# Patient Record
Sex: MALE | Race: WHITE | Employment: OTHER | URBAN - NONMETROPOLITAN AREA
[De-identification: names, ages, dates, MRNs, and addresses within clinical notes are randomized per-mention and may not be internally consistent; named-entity substitution may affect disease eponyms.]

---

## 2017-04-27 ENCOUNTER — OFFICE VISIT (OUTPATIENT)
Dept: UROLOGY | Age: 57
End: 2017-04-27

## 2017-04-27 VITALS
WEIGHT: 315 LBS | DIASTOLIC BLOOD PRESSURE: 88 MMHG | SYSTOLIC BLOOD PRESSURE: 138 MMHG | HEIGHT: 72 IN | BODY MASS INDEX: 42.66 KG/M2

## 2017-04-27 DIAGNOSIS — N39.41 URGE INCONTINENCE: ICD-10-CM

## 2017-04-27 DIAGNOSIS — N40.1 BENIGN NON-NODULAR PROSTATIC HYPERPLASIA WITH LOWER URINARY TRACT SYMPTOMS: ICD-10-CM

## 2017-04-27 DIAGNOSIS — R35.0 FREQUENCY OF URINATION: Primary | ICD-10-CM

## 2017-04-27 LAB — POST VOID RESIDUAL (PVR): 82 ML

## 2017-04-27 PROCEDURE — 99203 OFFICE O/P NEW LOW 30 MIN: CPT | Performed by: UROLOGY

## 2017-04-27 PROCEDURE — 51798 US URINE CAPACITY MEASURE: CPT | Performed by: UROLOGY

## 2017-04-27 RX ORDER — OXYBUTYNIN CHLORIDE 5 MG/1
5 TABLET ORAL 3 TIMES DAILY
Qty: 90 TABLET | Refills: 3 | Status: SHIPPED | OUTPATIENT
Start: 2017-04-27 | End: 2017-07-25 | Stop reason: SDUPTHER

## 2017-04-27 RX ORDER — TAMSULOSIN HYDROCHLORIDE 0.4 MG/1
0.4 CAPSULE ORAL NIGHTLY
Qty: 90 CAPSULE | Refills: 3 | Status: SHIPPED | OUTPATIENT
Start: 2017-04-27 | End: 2017-07-25 | Stop reason: SDUPTHER

## 2017-04-27 RX ORDER — LOSARTAN POTASSIUM 100 MG/1
100 TABLET ORAL DAILY
COMMUNITY
End: 2018-09-20

## 2017-04-27 RX ORDER — POLYETHYLENE GLYCOL 3350 17 G/17G
17 POWDER, FOR SOLUTION ORAL DAILY PRN
COMMUNITY
End: 2019-06-26

## 2017-04-27 RX ORDER — LACTULOSE 10 G/15ML
20 SOLUTION ORAL 3 TIMES DAILY
Status: ON HOLD | COMMUNITY
End: 2017-09-06 | Stop reason: ALTCHOICE

## 2017-04-27 ASSESSMENT — ENCOUNTER SYMPTOMS
COLOR CHANGE: 0
BACK PAIN: 1
ABDOMINAL PAIN: 0
NAUSEA: 0
EYE PAIN: 0
SHORTNESS OF BREATH: 1
CHEST TIGHTNESS: 0
EYE REDNESS: 0

## 2017-07-25 ENCOUNTER — OFFICE VISIT (OUTPATIENT)
Dept: UROLOGY | Age: 57
End: 2017-07-25
Payer: MEDICARE

## 2017-07-25 VITALS
WEIGHT: 315 LBS | SYSTOLIC BLOOD PRESSURE: 128 MMHG | DIASTOLIC BLOOD PRESSURE: 70 MMHG | HEIGHT: 73 IN | BODY MASS INDEX: 41.75 KG/M2

## 2017-07-25 DIAGNOSIS — N32.81 OAB (OVERACTIVE BLADDER): ICD-10-CM

## 2017-07-25 DIAGNOSIS — R35.0 URINARY FREQUENCY: Primary | ICD-10-CM

## 2017-07-25 DIAGNOSIS — N40.1 BENIGN NON-NODULAR PROSTATIC HYPERPLASIA WITH LOWER URINARY TRACT SYMPTOMS: ICD-10-CM

## 2017-07-25 LAB
BILIRUBIN URINE: NEGATIVE
BLOOD URINE, POC: NEGATIVE
CHARACTER, URINE: CLEAR
COLOR, URINE: YELLOW
GLUCOSE URINE: NEGATIVE MG/DL
KETONES, URINE: NEGATIVE
LEUKOCYTE CLUMPS, URINE: NEGATIVE
NITRITE, URINE: NEGATIVE
PH, URINE: 7
POST VOID RESIDUAL (PVR): 24 ML
PROTEIN, URINE: NEGATIVE MG/DL
SPECIFIC GRAVITY, URINE: 1.01 (ref 1–1.03)
UROBILINOGEN, URINE: 0.2 EU/DL

## 2017-07-25 PROCEDURE — 81003 URINALYSIS AUTO W/O SCOPE: CPT | Performed by: UROLOGY

## 2017-07-25 PROCEDURE — 51798 US URINE CAPACITY MEASURE: CPT | Performed by: UROLOGY

## 2017-07-25 PROCEDURE — 51741 ELECTRO-UROFLOWMETRY FIRST: CPT | Performed by: UROLOGY

## 2017-07-25 PROCEDURE — 99213 OFFICE O/P EST LOW 20 MIN: CPT | Performed by: UROLOGY

## 2017-07-25 RX ORDER — OXYBUTYNIN CHLORIDE 5 MG/1
5 TABLET ORAL 3 TIMES DAILY
Qty: 270 TABLET | Refills: 3 | Status: SHIPPED | OUTPATIENT
Start: 2017-07-25 | End: 2017-10-31

## 2017-07-25 RX ORDER — TAMSULOSIN HYDROCHLORIDE 0.4 MG/1
0.4 CAPSULE ORAL NIGHTLY
Qty: 90 CAPSULE | Refills: 3 | Status: SHIPPED | OUTPATIENT
Start: 2017-07-25 | End: 2017-12-13 | Stop reason: SDUPTHER

## 2017-09-06 ENCOUNTER — APPOINTMENT (OUTPATIENT)
Dept: CT IMAGING | Age: 57
DRG: 389 | End: 2017-09-06
Payer: MEDICARE

## 2017-09-06 ENCOUNTER — HOSPITAL ENCOUNTER (INPATIENT)
Age: 57
LOS: 4 days | Discharge: HOME | DRG: 389 | End: 2017-09-10
Attending: FAMILY MEDICINE | Admitting: INTERNAL MEDICINE
Payer: MEDICARE

## 2017-09-06 ENCOUNTER — APPOINTMENT (OUTPATIENT)
Dept: GENERAL RADIOLOGY | Age: 57
DRG: 389 | End: 2017-09-06
Payer: MEDICARE

## 2017-09-06 DIAGNOSIS — K56.609 SMALL BOWEL OBSTRUCTION (HCC): Primary | ICD-10-CM

## 2017-09-06 LAB
ALBUMIN SERPL-MCNC: 4.3 G/DL (ref 3.5–5.1)
ALP BLD-CCNC: 80 U/L (ref 38–126)
ALT SERPL-CCNC: 50 U/L (ref 11–66)
ANION GAP SERPL CALCULATED.3IONS-SCNC: 11 MEQ/L (ref 8–16)
ANISOCYTOSIS: ABNORMAL
AST SERPL-CCNC: 43 U/L (ref 5–40)
BACTERIA: ABNORMAL /HPF
BASOPHILS # BLD: 0.8 %
BASOPHILS ABSOLUTE: 0.1 THOU/MM3 (ref 0–0.1)
BILIRUB SERPL-MCNC: 0.7 MG/DL (ref 0.3–1.2)
BILIRUBIN DIRECT: < 0.2 MG/DL (ref 0–0.3)
BILIRUBIN URINE: NEGATIVE
BLOOD, URINE: NEGATIVE
BUN BLDV-MCNC: 7 MG/DL (ref 7–22)
CALCIUM SERPL-MCNC: 10.5 MG/DL (ref 8.5–10.5)
CASTS UA: ABNORMAL /LPF
CHARACTER, URINE: CLEAR
CHLORIDE BLD-SCNC: 97 MEQ/L (ref 98–111)
CO2: 31 MEQ/L (ref 23–33)
COLOR: YELLOW
CREAT SERPL-MCNC: 0.7 MG/DL (ref 0.4–1.2)
CRYSTALS, UA: ABNORMAL
EOSINOPHIL # BLD: 2.6 %
EOSINOPHILS ABSOLUTE: 0.3 THOU/MM3 (ref 0–0.4)
EPITHELIAL CELLS, UA: ABNORMAL /HPF
GFR SERPL CREATININE-BSD FRML MDRD: > 90 ML/MIN/1.73M2
GLUCOSE BLD-MCNC: 121 MG/DL (ref 70–108)
GLUCOSE URINE: NEGATIVE MG/DL
HCT VFR BLD CALC: 46.6 % (ref 42–52)
HEMOGLOBIN: 16 GM/DL (ref 14–18)
KETONES, URINE: NEGATIVE
LACTIC ACID: 1.5 MMOL/L (ref 0.5–2.2)
LEUKOCYTE ESTERASE, URINE: NEGATIVE
LIPASE: 15.8 U/L (ref 5.6–51.3)
LYMPHOCYTES # BLD: 11.2 %
LYMPHOCYTES ABSOLUTE: 1.3 THOU/MM3 (ref 1–4.8)
MAGNESIUM: 2.1 MG/DL (ref 1.6–2.4)
MCH RBC QN AUTO: 29.3 PG (ref 27–31)
MCHC RBC AUTO-ENTMCNC: 34.3 GM/DL (ref 33–37)
MCV RBC AUTO: 85.5 FL (ref 80–94)
MONOCYTES # BLD: 8.8 %
MONOCYTES ABSOLUTE: 1 THOU/MM3 (ref 0.4–1.3)
MRSA SCREEN RT-PCR: NEGATIVE
NITRITE, URINE: NEGATIVE
NUCLEATED RED BLOOD CELLS: 0 /100 WBC
OSMOLALITY CALCULATION: 276.8 MOSMOL/KG (ref 275–300)
PDW BLD-RTO: 15.5 % (ref 11.5–14.5)
PH UA: 7.5
PLATELET # BLD: 267 THOU/MM3 (ref 130–400)
PMV BLD AUTO: 7.6 MCM (ref 7.4–10.4)
POTASSIUM SERPL-SCNC: 3.6 MEQ/L (ref 3.5–5.2)
PRO-BNP: 163.5 PG/ML (ref 0–900)
PROTEIN UA: 100
RBC # BLD: 5.45 MILL/MM3 (ref 4.7–6.1)
RBC # BLD: NORMAL 10*6/UL
RBC URINE: ABNORMAL /HPF
SEG NEUTROPHILS: 76.6 %
SEGMENTED NEUTROPHILS ABSOLUTE COUNT: 9 THOU/MM3 (ref 1.8–7.7)
SODIUM BLD-SCNC: 139 MEQ/L (ref 135–145)
SPECIFIC GRAVITY, URINE: > 1.03 (ref 1–1.03)
TOTAL PROTEIN: 8 G/DL (ref 6.1–8)
TROPONIN T: < 0.01 NG/ML
UROBILINOGEN, URINE: 0.2 EU/DL
WBC # BLD: 11.8 THOU/MM3 (ref 4.8–10.8)
WBC UA: ABNORMAL /HPF

## 2017-09-06 PROCEDURE — 6360000002 HC RX W HCPCS: Performed by: FAMILY MEDICINE

## 2017-09-06 PROCEDURE — 6370000000 HC RX 637 (ALT 250 FOR IP): Performed by: INTERNAL MEDICINE

## 2017-09-06 PROCEDURE — 96374 THER/PROPH/DIAG INJ IV PUSH: CPT

## 2017-09-06 PROCEDURE — 81001 URINALYSIS AUTO W/SCOPE: CPT

## 2017-09-06 PROCEDURE — 2500000003 HC RX 250 WO HCPCS: Performed by: INTERNAL MEDICINE

## 2017-09-06 PROCEDURE — 2060000000 HC ICU INTERMEDIATE R&B

## 2017-09-06 PROCEDURE — 83690 ASSAY OF LIPASE: CPT

## 2017-09-06 PROCEDURE — 6360000002 HC RX W HCPCS: Performed by: INTERNAL MEDICINE

## 2017-09-06 PROCEDURE — 71010 XR CHEST PORTABLE: CPT

## 2017-09-06 PROCEDURE — 36415 COLL VENOUS BLD VENIPUNCTURE: CPT

## 2017-09-06 PROCEDURE — 99285 EMERGENCY DEPT VISIT HI MDM: CPT

## 2017-09-06 PROCEDURE — 93005 ELECTROCARDIOGRAM TRACING: CPT

## 2017-09-06 PROCEDURE — 83880 ASSAY OF NATRIURETIC PEPTIDE: CPT

## 2017-09-06 PROCEDURE — 74177 CT ABD & PELVIS W/CONTRAST: CPT

## 2017-09-06 PROCEDURE — 87081 CULTURE SCREEN ONLY: CPT

## 2017-09-06 PROCEDURE — 80053 COMPREHEN METABOLIC PANEL: CPT

## 2017-09-06 PROCEDURE — 83605 ASSAY OF LACTIC ACID: CPT

## 2017-09-06 PROCEDURE — 87641 MR-STAPH DNA AMP PROBE: CPT

## 2017-09-06 PROCEDURE — 96361 HYDRATE IV INFUSION ADD-ON: CPT

## 2017-09-06 PROCEDURE — 82248 BILIRUBIN DIRECT: CPT

## 2017-09-06 PROCEDURE — 2580000003 HC RX 258: Performed by: FAMILY MEDICINE

## 2017-09-06 PROCEDURE — 85025 COMPLETE CBC W/AUTO DIFF WBC: CPT

## 2017-09-06 PROCEDURE — 6360000004 HC RX CONTRAST MEDICATION: Performed by: FAMILY MEDICINE

## 2017-09-06 PROCEDURE — 83735 ASSAY OF MAGNESIUM: CPT

## 2017-09-06 PROCEDURE — 84484 ASSAY OF TROPONIN QUANT: CPT

## 2017-09-06 PROCEDURE — 99223 1ST HOSP IP/OBS HIGH 75: CPT | Performed by: INTERNAL MEDICINE

## 2017-09-06 PROCEDURE — 2580000003 HC RX 258: Performed by: INTERNAL MEDICINE

## 2017-09-06 RX ORDER — ATENOLOL AND CHLORTHALIDONE TABLET 100; 25 MG/1; MG/1
1 TABLET ORAL DAILY
Status: ON HOLD | COMMUNITY
End: 2018-02-12 | Stop reason: HOSPADM

## 2017-09-06 RX ORDER — SODIUM CHLORIDE 9 MG/ML
INJECTION, SOLUTION INTRAVENOUS CONTINUOUS
Status: DISCONTINUED | OUTPATIENT
Start: 2017-09-06 | End: 2017-09-09

## 2017-09-06 RX ORDER — ALBUTEROL SULFATE 90 UG/1
1 AEROSOL, METERED RESPIRATORY (INHALATION) EVERY 6 HOURS PRN
Status: DISCONTINUED | OUTPATIENT
Start: 2017-09-06 | End: 2017-09-10 | Stop reason: HOSPADM

## 2017-09-06 RX ORDER — ONDANSETRON 2 MG/ML
4 INJECTION INTRAMUSCULAR; INTRAVENOUS ONCE
Status: COMPLETED | OUTPATIENT
Start: 2017-09-06 | End: 2017-09-06

## 2017-09-06 RX ORDER — MORPHINE SULFATE 4 MG/ML
4 INJECTION, SOLUTION INTRAMUSCULAR; INTRAVENOUS ONCE
Status: COMPLETED | OUTPATIENT
Start: 2017-09-06 | End: 2017-09-06

## 2017-09-06 RX ORDER — METOPROLOL TARTRATE 5 MG/5ML
INJECTION INTRAVENOUS
Status: DISPENSED
Start: 2017-09-06 | End: 2017-09-07

## 2017-09-06 RX ORDER — POTASSIUM CHLORIDE 20 MEQ/1
40 TABLET, EXTENDED RELEASE ORAL PRN
Status: DISCONTINUED | OUTPATIENT
Start: 2017-09-06 | End: 2017-09-10 | Stop reason: HOSPADM

## 2017-09-06 RX ORDER — POTASSIUM CHLORIDE 20MEQ/15ML
40 LIQUID (ML) ORAL PRN
Status: DISCONTINUED | OUTPATIENT
Start: 2017-09-06 | End: 2017-09-10 | Stop reason: HOSPADM

## 2017-09-06 RX ORDER — MORPHINE SULFATE 4 MG/ML
INJECTION, SOLUTION INTRAMUSCULAR; INTRAVENOUS
Status: DISPENSED
Start: 2017-09-06 | End: 2017-09-07

## 2017-09-06 RX ORDER — SODIUM CHLORIDE 0.9 % (FLUSH) 0.9 %
10 SYRINGE (ML) INJECTION EVERY 12 HOURS SCHEDULED
Status: DISCONTINUED | OUTPATIENT
Start: 2017-09-06 | End: 2017-09-10 | Stop reason: HOSPADM

## 2017-09-06 RX ORDER — 0.9 % SODIUM CHLORIDE 0.9 %
1000 INTRAVENOUS SOLUTION INTRAVENOUS ONCE
Status: COMPLETED | OUTPATIENT
Start: 2017-09-06 | End: 2017-09-06

## 2017-09-06 RX ORDER — AMLODIPINE BESYLATE 10 MG/1
10 TABLET ORAL DAILY
COMMUNITY
End: 2020-01-08 | Stop reason: ALTCHOICE

## 2017-09-06 RX ORDER — POTASSIUM CHLORIDE 7.45 MG/ML
10 INJECTION INTRAVENOUS PRN
Status: DISCONTINUED | OUTPATIENT
Start: 2017-09-06 | End: 2017-09-10 | Stop reason: HOSPADM

## 2017-09-06 RX ORDER — ONDANSETRON 2 MG/ML
4 INJECTION INTRAMUSCULAR; INTRAVENOUS EVERY 6 HOURS PRN
Status: DISCONTINUED | OUTPATIENT
Start: 2017-09-06 | End: 2017-09-10 | Stop reason: HOSPADM

## 2017-09-06 RX ORDER — ACETAMINOPHEN 325 MG/1
650 TABLET ORAL EVERY 4 HOURS PRN
Status: DISCONTINUED | OUTPATIENT
Start: 2017-09-06 | End: 2017-09-10 | Stop reason: HOSPADM

## 2017-09-06 RX ORDER — HYDRALAZINE HYDROCHLORIDE 20 MG/ML
10 INJECTION INTRAMUSCULAR; INTRAVENOUS ONCE
Status: COMPLETED | OUTPATIENT
Start: 2017-09-06 | End: 2017-09-06

## 2017-09-06 RX ORDER — SODIUM CHLORIDE 0.9 % (FLUSH) 0.9 %
10 SYRINGE (ML) INJECTION PRN
Status: DISCONTINUED | OUTPATIENT
Start: 2017-09-06 | End: 2017-09-10 | Stop reason: HOSPADM

## 2017-09-06 RX ORDER — METOPROLOL TARTRATE 5 MG/5ML
10 INJECTION INTRAVENOUS EVERY 6 HOURS
Status: DISCONTINUED | OUTPATIENT
Start: 2017-09-06 | End: 2017-09-07

## 2017-09-06 RX ADMIN — IOPAMIDOL 80 ML: 755 INJECTION, SOLUTION INTRAVENOUS at 13:56

## 2017-09-06 RX ADMIN — HYDROMORPHONE HYDROCHLORIDE 1 MG: 1 INJECTION, SOLUTION INTRAMUSCULAR; INTRAVENOUS; SUBCUTANEOUS at 18:27

## 2017-09-06 RX ADMIN — SODIUM CHLORIDE: 9 INJECTION, SOLUTION INTRAVENOUS at 18:19

## 2017-09-06 RX ADMIN — ONDANSETRON 4 MG: 2 INJECTION INTRAMUSCULAR; INTRAVENOUS at 15:32

## 2017-09-06 RX ADMIN — HYDROMORPHONE HYDROCHLORIDE 1 MG: 1 INJECTION, SOLUTION INTRAMUSCULAR; INTRAVENOUS; SUBCUTANEOUS at 23:33

## 2017-09-06 RX ADMIN — MORPHINE SULFATE 4 MG: 4 INJECTION, SOLUTION INTRAMUSCULAR; INTRAVENOUS at 16:25

## 2017-09-06 RX ADMIN — METOPROLOL TARTRATE 10 MG: 5 INJECTION INTRAVENOUS at 17:57

## 2017-09-06 RX ADMIN — HYDRALAZINE HYDROCHLORIDE 10 MG: 20 INJECTION INTRAMUSCULAR; INTRAVENOUS at 22:42

## 2017-09-06 RX ADMIN — SODIUM CHLORIDE 1000 ML: 9 INJECTION, SOLUTION INTRAVENOUS at 15:33

## 2017-09-06 ASSESSMENT — PAIN DESCRIPTION - LOCATION
LOCATION: ABDOMEN

## 2017-09-06 ASSESSMENT — PAIN SCALES - GENERAL
PAINLEVEL_OUTOF10: 10
PAINLEVEL_OUTOF10: 8
PAINLEVEL_OUTOF10: 8
PAINLEVEL_OUTOF10: 5
PAINLEVEL_OUTOF10: 7
PAINLEVEL_OUTOF10: 2

## 2017-09-06 ASSESSMENT — ENCOUNTER SYMPTOMS
NAUSEA: 1
CONSTIPATION: 0
ABDOMINAL PAIN: 1
COLOR CHANGE: 0
VOMITING: 1
SHORTNESS OF BREATH: 0
BACK PAIN: 0
DIARRHEA: 0
CHEST TIGHTNESS: 1

## 2017-09-06 ASSESSMENT — PAIN DESCRIPTION - PAIN TYPE
TYPE: ACUTE PAIN
TYPE: ACUTE PAIN

## 2017-09-06 ASSESSMENT — PAIN DESCRIPTION - DESCRIPTORS
DESCRIPTORS: CRAMPING
DESCRIPTORS: CRAMPING

## 2017-09-06 ASSESSMENT — PAIN DESCRIPTION - ORIENTATION
ORIENTATION: UPPER
ORIENTATION: UPPER

## 2017-09-07 PROBLEM — K63.8219 SMALL INTESTINAL BACTERIAL OVERGROWTH: Status: ACTIVE | Noted: 2017-09-07

## 2017-09-07 PROBLEM — R19.7 ACUTE DIARRHEA: Status: ACTIVE | Noted: 2017-09-07

## 2017-09-07 PROBLEM — K63.89 SMALL INTESTINAL BACTERIAL OVERGROWTH: Status: ACTIVE | Noted: 2017-09-07

## 2017-09-07 LAB
ANION GAP SERPL CALCULATED.3IONS-SCNC: 12 MEQ/L (ref 8–16)
AVERAGE GLUCOSE: 150 MG/DL (ref 70–126)
BUN BLDV-MCNC: 9 MG/DL (ref 7–22)
CALCIUM SERPL-MCNC: 9 MG/DL (ref 8.5–10.5)
CHLORIDE BLD-SCNC: 102 MEQ/L (ref 98–111)
CO2: 30 MEQ/L (ref 23–33)
CREAT SERPL-MCNC: 0.8 MG/DL (ref 0.4–1.2)
EKG ATRIAL RATE: 91 BPM
EKG P AXIS: 50 DEGREES
EKG P-R INTERVAL: 160 MS
EKG Q-T INTERVAL: 374 MS
EKG QRS DURATION: 100 MS
EKG QTC CALCULATION (BAZETT): 460 MS
EKG R AXIS: -41 DEGREES
EKG T AXIS: 22 DEGREES
EKG VENTRICULAR RATE: 91 BPM
GFR SERPL CREATININE-BSD FRML MDRD: > 90 ML/MIN/1.73M2
GLUCOSE BLD-MCNC: 127 MG/DL (ref 70–108)
HBA1C MFR BLD: 7 % (ref 4.4–6.4)
HCT VFR BLD CALC: 44.4 % (ref 42–52)
HEMOGLOBIN: 15.2 GM/DL (ref 14–18)
MCH RBC QN AUTO: 29.7 PG (ref 27–31)
MCHC RBC AUTO-ENTMCNC: 34.2 GM/DL (ref 33–37)
MCV RBC AUTO: 86.8 FL (ref 80–94)
PDW BLD-RTO: 15.5 % (ref 11.5–14.5)
PLATELET # BLD: 247 THOU/MM3 (ref 130–400)
PMV BLD AUTO: 7.5 MCM (ref 7.4–10.4)
POTASSIUM SERPL-SCNC: 3.7 MEQ/L (ref 3.5–5.2)
RBC # BLD: 5.11 MILL/MM3 (ref 4.7–6.1)
SODIUM BLD-SCNC: 144 MEQ/L (ref 135–145)
WBC # BLD: 10 THOU/MM3 (ref 4.8–10.8)

## 2017-09-07 PROCEDURE — 6360000002 HC RX W HCPCS: Performed by: INTERNAL MEDICINE

## 2017-09-07 PROCEDURE — A6198 ALGINATE DRESSING > 48 SQ IN: HCPCS

## 2017-09-07 PROCEDURE — 80048 BASIC METABOLIC PNL TOTAL CA: CPT

## 2017-09-07 PROCEDURE — 99222 1ST HOSP IP/OBS MODERATE 55: CPT | Performed by: SURGERY

## 2017-09-07 PROCEDURE — 2500000003 HC RX 250 WO HCPCS: Performed by: INTERNAL MEDICINE

## 2017-09-07 PROCEDURE — 2580000003 HC RX 258: Performed by: NURSE PRACTITIONER

## 2017-09-07 PROCEDURE — C9113 INJ PANTOPRAZOLE SODIUM, VIA: HCPCS | Performed by: NURSE PRACTITIONER

## 2017-09-07 PROCEDURE — 6370000000 HC RX 637 (ALT 250 FOR IP): Performed by: INTERNAL MEDICINE

## 2017-09-07 PROCEDURE — 2580000003 HC RX 258: Performed by: INTERNAL MEDICINE

## 2017-09-07 PROCEDURE — 83036 HEMOGLOBIN GLYCOSYLATED A1C: CPT

## 2017-09-07 PROCEDURE — 36415 COLL VENOUS BLD VENIPUNCTURE: CPT

## 2017-09-07 PROCEDURE — 85027 COMPLETE CBC AUTOMATED: CPT

## 2017-09-07 PROCEDURE — 99999 PR OFFICE/OUTPT VISIT,PROCEDURE ONLY: CPT | Performed by: NURSE PRACTITIONER

## 2017-09-07 PROCEDURE — 6370000000 HC RX 637 (ALT 250 FOR IP): Performed by: NURSE PRACTITIONER

## 2017-09-07 PROCEDURE — 2060000000 HC ICU INTERMEDIATE R&B

## 2017-09-07 PROCEDURE — 99233 SBSQ HOSP IP/OBS HIGH 50: CPT | Performed by: INTERNAL MEDICINE

## 2017-09-07 PROCEDURE — 6360000002 HC RX W HCPCS: Performed by: NURSE PRACTITIONER

## 2017-09-07 RX ORDER — LOSARTAN POTASSIUM 100 MG/1
100 TABLET ORAL DAILY
Status: DISCONTINUED | OUTPATIENT
Start: 2017-09-07 | End: 2017-09-10 | Stop reason: HOSPADM

## 2017-09-07 RX ORDER — BISACODYL 10 MG
10 SUPPOSITORY, RECTAL RECTAL DAILY
Status: DISCONTINUED | OUTPATIENT
Start: 2017-09-07 | End: 2017-09-10 | Stop reason: HOSPADM

## 2017-09-07 RX ORDER — CLONIDINE HYDROCHLORIDE 0.2 MG/1
0.2 TABLET ORAL 3 TIMES DAILY
Status: DISCONTINUED | OUTPATIENT
Start: 2017-09-07 | End: 2017-09-10 | Stop reason: HOSPADM

## 2017-09-07 RX ORDER — 0.9 % SODIUM CHLORIDE 0.9 %
10 VIAL (ML) INJECTION DAILY
Status: DISCONTINUED | OUTPATIENT
Start: 2017-09-07 | End: 2017-09-10

## 2017-09-07 RX ORDER — AMLODIPINE BESYLATE 10 MG/1
10 TABLET ORAL DAILY
Status: DISCONTINUED | OUTPATIENT
Start: 2017-09-07 | End: 2017-09-10 | Stop reason: HOSPADM

## 2017-09-07 RX ORDER — CHLORTHALIDONE 25 MG/1
25 TABLET ORAL DAILY
Status: DISCONTINUED | OUTPATIENT
Start: 2017-09-07 | End: 2017-09-10 | Stop reason: HOSPADM

## 2017-09-07 RX ORDER — PANTOPRAZOLE SODIUM 40 MG/10ML
40 INJECTION, POWDER, LYOPHILIZED, FOR SOLUTION INTRAVENOUS DAILY
Status: DISCONTINUED | OUTPATIENT
Start: 2017-09-07 | End: 2017-09-10

## 2017-09-07 RX ORDER — ATENOLOL 100 MG/1
100 TABLET ORAL DAILY
Status: DISCONTINUED | OUTPATIENT
Start: 2017-09-07 | End: 2017-09-10 | Stop reason: HOSPADM

## 2017-09-07 RX ORDER — METRONIDAZOLE 500 MG/1
500 TABLET ORAL EVERY 8 HOURS SCHEDULED
Status: DISCONTINUED | OUTPATIENT
Start: 2017-09-07 | End: 2017-09-10 | Stop reason: HOSPADM

## 2017-09-07 RX ORDER — ATENOLOL AND CHLORTHALIDONE TABLET 100; 25 MG/1; MG/1
1 TABLET ORAL DAILY
Status: DISCONTINUED | OUTPATIENT
Start: 2017-09-07 | End: 2017-09-07 | Stop reason: CLARIF

## 2017-09-07 RX ORDER — HYDRALAZINE HYDROCHLORIDE 20 MG/ML
10 INJECTION INTRAMUSCULAR; INTRAVENOUS EVERY 6 HOURS PRN
Status: DISCONTINUED | OUTPATIENT
Start: 2017-09-07 | End: 2017-09-10 | Stop reason: HOSPADM

## 2017-09-07 RX ADMIN — LOSARTAN POTASSIUM 100 MG: 100 TABLET, FILM COATED ORAL at 09:00

## 2017-09-07 RX ADMIN — METOPROLOL TARTRATE 10 MG: 5 INJECTION INTRAVENOUS at 01:01

## 2017-09-07 RX ADMIN — ENOXAPARIN SODIUM 40 MG: 40 INJECTION SUBCUTANEOUS at 20:03

## 2017-09-07 RX ADMIN — CLONIDINE HYDROCHLORIDE 0.2 MG: 0.2 TABLET ORAL at 09:00

## 2017-09-07 RX ADMIN — AMLODIPINE BESYLATE 10 MG: 10 TABLET ORAL at 09:00

## 2017-09-07 RX ADMIN — ACETAMINOPHEN 650 MG: 325 TABLET ORAL at 14:16

## 2017-09-07 RX ADMIN — METOPROLOL TARTRATE 10 MG: 5 INJECTION INTRAVENOUS at 06:44

## 2017-09-07 RX ADMIN — METRONIDAZOLE 500 MG: 500 TABLET, FILM COATED ORAL at 20:05

## 2017-09-07 RX ADMIN — METRONIDAZOLE 500 MG: 500 TABLET, FILM COATED ORAL at 14:16

## 2017-09-07 RX ADMIN — SODIUM CHLORIDE: 9 INJECTION, SOLUTION INTRAVENOUS at 06:54

## 2017-09-07 RX ADMIN — PANTOPRAZOLE SODIUM 40 MG: 40 INJECTION, POWDER, FOR SOLUTION INTRAVENOUS at 12:24

## 2017-09-07 RX ADMIN — MAGNESIUM HYDROXIDE 30 ML: 400 SUSPENSION ORAL at 23:39

## 2017-09-07 RX ADMIN — CLONIDINE HYDROCHLORIDE 0.2 MG: 0.2 TABLET ORAL at 14:16

## 2017-09-07 RX ADMIN — CLONIDINE HYDROCHLORIDE 0.2 MG: 0.2 TABLET ORAL at 20:03

## 2017-09-07 RX ADMIN — LEVOTHYROXINE SODIUM 175 MCG: 150 TABLET ORAL at 09:00

## 2017-09-07 RX ADMIN — ATENOLOL 100 MG: 100 TABLET ORAL at 10:11

## 2017-09-07 RX ADMIN — SODIUM CHLORIDE: 9 INJECTION, SOLUTION INTRAVENOUS at 19:58

## 2017-09-07 RX ADMIN — CHLORTHALIDONE 25 MG: 25 TABLET ORAL at 10:11

## 2017-09-07 RX ADMIN — Medication 10 ML: at 20:04

## 2017-09-07 RX ADMIN — ACETAMINOPHEN 650 MG: 325 TABLET ORAL at 20:03

## 2017-09-07 RX ADMIN — ACETAMINOPHEN 650 MG: 325 TABLET ORAL at 09:00

## 2017-09-07 RX ADMIN — ENOXAPARIN SODIUM 40 MG: 40 INJECTION SUBCUTANEOUS at 10:11

## 2017-09-07 RX ADMIN — HYDROMORPHONE HYDROCHLORIDE 1 MG: 1 INJECTION, SOLUTION INTRAMUSCULAR; INTRAVENOUS; SUBCUTANEOUS at 03:11

## 2017-09-07 RX ADMIN — Medication 10 ML: at 12:25

## 2017-09-07 RX ADMIN — Medication 10 ML: at 09:01

## 2017-09-07 ASSESSMENT — PAIN SCALES - GENERAL
PAINLEVEL_OUTOF10: 8
PAINLEVEL_OUTOF10: 3
PAINLEVEL_OUTOF10: 4
PAINLEVEL_OUTOF10: 3
PAINLEVEL_OUTOF10: 0
PAINLEVEL_OUTOF10: 0
PAINLEVEL_OUTOF10: 8
PAINLEVEL_OUTOF10: 5

## 2017-09-07 ASSESSMENT — PAIN DESCRIPTION - LOCATION
LOCATION: HEAD

## 2017-09-07 ASSESSMENT — PAIN DESCRIPTION - DESCRIPTORS
DESCRIPTORS: ACHING
DESCRIPTORS: ACHING

## 2017-09-07 ASSESSMENT — PAIN DESCRIPTION - PAIN TYPE
TYPE: ACUTE PAIN
TYPE: ACUTE PAIN

## 2017-09-08 ENCOUNTER — APPOINTMENT (OUTPATIENT)
Dept: GENERAL RADIOLOGY | Age: 57
DRG: 389 | End: 2017-09-08
Payer: MEDICARE

## 2017-09-08 LAB — VRE CULTURE: NORMAL

## 2017-09-08 PROCEDURE — 6360000002 HC RX W HCPCS: Performed by: NURSE PRACTITIONER

## 2017-09-08 PROCEDURE — C9113 INJ PANTOPRAZOLE SODIUM, VIA: HCPCS | Performed by: NURSE PRACTITIONER

## 2017-09-08 PROCEDURE — 6370000000 HC RX 637 (ALT 250 FOR IP): Performed by: INTERNAL MEDICINE

## 2017-09-08 PROCEDURE — 74000 XR ABDOMEN LIMITED (KUB): CPT

## 2017-09-08 PROCEDURE — 2580000003 HC RX 258: Performed by: NURSE PRACTITIONER

## 2017-09-08 PROCEDURE — 6370000000 HC RX 637 (ALT 250 FOR IP): Performed by: NURSE PRACTITIONER

## 2017-09-08 PROCEDURE — 99233 SBSQ HOSP IP/OBS HIGH 50: CPT | Performed by: SURGERY

## 2017-09-08 PROCEDURE — 2060000000 HC ICU INTERMEDIATE R&B

## 2017-09-08 PROCEDURE — 6360000002 HC RX W HCPCS: Performed by: INTERNAL MEDICINE

## 2017-09-08 PROCEDURE — 2580000003 HC RX 258: Performed by: INTERNAL MEDICINE

## 2017-09-08 PROCEDURE — 99232 SBSQ HOSP IP/OBS MODERATE 35: CPT | Performed by: FAMILY MEDICINE

## 2017-09-08 PROCEDURE — 99999 PR OFFICE/OUTPT VISIT,PROCEDURE ONLY: CPT | Performed by: NURSE PRACTITIONER

## 2017-09-08 RX ORDER — POLYETHYLENE GLYCOL 3350 17 G/17G
17 POWDER, FOR SOLUTION ORAL 2 TIMES DAILY
Status: DISCONTINUED | OUTPATIENT
Start: 2017-09-08 | End: 2017-09-10 | Stop reason: HOSPADM

## 2017-09-08 RX ADMIN — SODIUM CHLORIDE: 9 INJECTION, SOLUTION INTRAVENOUS at 10:37

## 2017-09-08 RX ADMIN — HYDRALAZINE HYDROCHLORIDE 10 MG: 20 INJECTION INTRAMUSCULAR; INTRAVENOUS at 12:59

## 2017-09-08 RX ADMIN — METRONIDAZOLE 500 MG: 500 TABLET, FILM COATED ORAL at 04:02

## 2017-09-08 RX ADMIN — ACETAMINOPHEN 650 MG: 325 TABLET ORAL at 17:11

## 2017-09-08 RX ADMIN — Medication 10 ML: at 09:08

## 2017-09-08 RX ADMIN — METRONIDAZOLE 500 MG: 500 TABLET, FILM COATED ORAL at 15:11

## 2017-09-08 RX ADMIN — ENOXAPARIN SODIUM 40 MG: 40 INJECTION SUBCUTANEOUS at 09:07

## 2017-09-08 RX ADMIN — CLONIDINE HYDROCHLORIDE 0.2 MG: 0.2 TABLET ORAL at 09:07

## 2017-09-08 RX ADMIN — METRONIDAZOLE 500 MG: 500 TABLET, FILM COATED ORAL at 20:50

## 2017-09-08 RX ADMIN — POLYETHYLENE GLYCOL 3350 17 G: 17 POWDER, FOR SOLUTION ORAL at 20:50

## 2017-09-08 RX ADMIN — CLONIDINE HYDROCHLORIDE 0.2 MG: 0.2 TABLET ORAL at 20:50

## 2017-09-08 RX ADMIN — POLYETHYLENE GLYCOL 3350 17 G: 17 POWDER, FOR SOLUTION ORAL at 12:55

## 2017-09-08 RX ADMIN — NALOXEGOL OXALATE 12.5 MG: 12.5 TABLET, FILM COATED ORAL at 15:11

## 2017-09-08 RX ADMIN — ENOXAPARIN SODIUM 40 MG: 40 INJECTION SUBCUTANEOUS at 20:50

## 2017-09-08 RX ADMIN — PANTOPRAZOLE SODIUM 40 MG: 40 INJECTION, POWDER, FOR SOLUTION INTRAVENOUS at 09:07

## 2017-09-08 RX ADMIN — CLONIDINE HYDROCHLORIDE 0.2 MG: 0.2 TABLET ORAL at 15:12

## 2017-09-08 RX ADMIN — ATENOLOL 100 MG: 100 TABLET ORAL at 09:07

## 2017-09-08 RX ADMIN — CHLORTHALIDONE 25 MG: 25 TABLET ORAL at 09:07

## 2017-09-08 RX ADMIN — LEVOTHYROXINE SODIUM 175 MCG: 150 TABLET ORAL at 09:07

## 2017-09-08 RX ADMIN — AMLODIPINE BESYLATE 10 MG: 10 TABLET ORAL at 09:07

## 2017-09-08 RX ADMIN — LOSARTAN POTASSIUM 100 MG: 100 TABLET, FILM COATED ORAL at 09:08

## 2017-09-08 RX ADMIN — ACETAMINOPHEN 650 MG: 325 TABLET ORAL at 22:38

## 2017-09-08 ASSESSMENT — PAIN SCALES - GENERAL
PAINLEVEL_OUTOF10: 3
PAINLEVEL_OUTOF10: 2

## 2017-09-08 ASSESSMENT — PAIN DESCRIPTION - LOCATION
LOCATION: GENERALIZED
LOCATION: HEAD

## 2017-09-08 ASSESSMENT — PAIN DESCRIPTION - PAIN TYPE
TYPE: ACUTE PAIN
TYPE: ACUTE PAIN

## 2017-09-08 ASSESSMENT — PAIN DESCRIPTION - DESCRIPTORS: DESCRIPTORS: HEADACHE

## 2017-09-09 PROCEDURE — 99999 PR OFFICE/OUTPT VISIT,PROCEDURE ONLY: CPT | Performed by: NURSE PRACTITIONER

## 2017-09-09 PROCEDURE — 6370000000 HC RX 637 (ALT 250 FOR IP): Performed by: NURSE PRACTITIONER

## 2017-09-09 PROCEDURE — 6370000000 HC RX 637 (ALT 250 FOR IP): Performed by: INTERNAL MEDICINE

## 2017-09-09 PROCEDURE — 6360000002 HC RX W HCPCS: Performed by: NURSE PRACTITIONER

## 2017-09-09 PROCEDURE — 6360000002 HC RX W HCPCS: Performed by: INTERNAL MEDICINE

## 2017-09-09 PROCEDURE — 2580000003 HC RX 258: Performed by: NURSE PRACTITIONER

## 2017-09-09 PROCEDURE — 99232 SBSQ HOSP IP/OBS MODERATE 35: CPT | Performed by: FAMILY MEDICINE

## 2017-09-09 PROCEDURE — C9113 INJ PANTOPRAZOLE SODIUM, VIA: HCPCS | Performed by: NURSE PRACTITIONER

## 2017-09-09 PROCEDURE — 2060000000 HC ICU INTERMEDIATE R&B

## 2017-09-09 PROCEDURE — A6198 ALGINATE DRESSING > 48 SQ IN: HCPCS

## 2017-09-09 PROCEDURE — 99233 SBSQ HOSP IP/OBS HIGH 50: CPT | Performed by: SURGERY

## 2017-09-09 PROCEDURE — 2580000003 HC RX 258: Performed by: INTERNAL MEDICINE

## 2017-09-09 RX ADMIN — LOSARTAN POTASSIUM 100 MG: 100 TABLET, FILM COATED ORAL at 10:12

## 2017-09-09 RX ADMIN — CHLORTHALIDONE 25 MG: 25 TABLET ORAL at 10:33

## 2017-09-09 RX ADMIN — CLONIDINE HYDROCHLORIDE 0.2 MG: 0.2 TABLET ORAL at 10:12

## 2017-09-09 RX ADMIN — ENOXAPARIN SODIUM 40 MG: 40 INJECTION SUBCUTANEOUS at 19:55

## 2017-09-09 RX ADMIN — NALOXEGOL OXALATE 12.5 MG: 12.5 TABLET, FILM COATED ORAL at 10:12

## 2017-09-09 RX ADMIN — SODIUM CHLORIDE: 9 INJECTION, SOLUTION INTRAVENOUS at 00:05

## 2017-09-09 RX ADMIN — METRONIDAZOLE 500 MG: 500 TABLET, FILM COATED ORAL at 06:36

## 2017-09-09 RX ADMIN — ATENOLOL 100 MG: 100 TABLET ORAL at 10:34

## 2017-09-09 RX ADMIN — METRONIDAZOLE 500 MG: 500 TABLET, FILM COATED ORAL at 15:28

## 2017-09-09 RX ADMIN — POLYETHYLENE GLYCOL 3350 17 G: 17 POWDER, FOR SOLUTION ORAL at 19:56

## 2017-09-09 RX ADMIN — POLYETHYLENE GLYCOL 3350 17 G: 17 POWDER, FOR SOLUTION ORAL at 10:11

## 2017-09-09 RX ADMIN — AMLODIPINE BESYLATE 10 MG: 10 TABLET ORAL at 10:12

## 2017-09-09 RX ADMIN — CLONIDINE HYDROCHLORIDE 0.2 MG: 0.2 TABLET ORAL at 15:28

## 2017-09-09 RX ADMIN — ACETAMINOPHEN 650 MG: 325 TABLET ORAL at 19:55

## 2017-09-09 RX ADMIN — CLONIDINE HYDROCHLORIDE 0.2 MG: 0.2 TABLET ORAL at 19:55

## 2017-09-09 RX ADMIN — ENOXAPARIN SODIUM 40 MG: 40 INJECTION SUBCUTANEOUS at 10:33

## 2017-09-09 RX ADMIN — PANTOPRAZOLE SODIUM 40 MG: 40 INJECTION, POWDER, FOR SOLUTION INTRAVENOUS at 10:11

## 2017-09-09 RX ADMIN — Medication 10 ML: at 10:13

## 2017-09-09 RX ADMIN — LEVOTHYROXINE SODIUM 175 MCG: 150 TABLET ORAL at 10:12

## 2017-09-09 RX ADMIN — METRONIDAZOLE 500 MG: 500 TABLET, FILM COATED ORAL at 20:00

## 2017-09-09 RX ADMIN — Medication 10 ML: at 19:55

## 2017-09-09 ASSESSMENT — PAIN SCALES - GENERAL
PAINLEVEL_OUTOF10: 3
PAINLEVEL_OUTOF10: 0

## 2017-09-10 VITALS
HEART RATE: 60 BPM | SYSTOLIC BLOOD PRESSURE: 155 MMHG | BODY MASS INDEX: 41.75 KG/M2 | WEIGHT: 315 LBS | TEMPERATURE: 98.5 F | DIASTOLIC BLOOD PRESSURE: 89 MMHG | OXYGEN SATURATION: 95 % | RESPIRATION RATE: 16 BRPM | HEIGHT: 73 IN

## 2017-09-10 LAB
ALBUMIN SERPL-MCNC: 3.7 G/DL (ref 3.5–5.1)
ALP BLD-CCNC: 59 U/L (ref 38–126)
ALT SERPL-CCNC: 59 U/L (ref 11–66)
ANION GAP SERPL CALCULATED.3IONS-SCNC: 12 MEQ/L (ref 8–16)
ANISOCYTOSIS: ABNORMAL
AST SERPL-CCNC: 72 U/L (ref 5–40)
BASOPHILS # BLD: 0.6 %
BASOPHILS ABSOLUTE: 0 THOU/MM3 (ref 0–0.1)
BILIRUB SERPL-MCNC: 0.7 MG/DL (ref 0.3–1.2)
BUN BLDV-MCNC: 8 MG/DL (ref 7–22)
CALCIUM SERPL-MCNC: 8.9 MG/DL (ref 8.5–10.5)
CHLORIDE BLD-SCNC: 98 MEQ/L (ref 98–111)
CO2: 30 MEQ/L (ref 23–33)
CREAT SERPL-MCNC: 0.9 MG/DL (ref 0.4–1.2)
EOSINOPHIL # BLD: 1.9 %
EOSINOPHILS ABSOLUTE: 0.1 THOU/MM3 (ref 0–0.4)
GFR SERPL CREATININE-BSD FRML MDRD: 88 ML/MIN/1.73M2
GLUCOSE BLD-MCNC: 111 MG/DL (ref 70–108)
HCT VFR BLD CALC: 41.3 % (ref 42–52)
HEMOGLOBIN: 14.4 GM/DL (ref 14–18)
LYMPHOCYTES # BLD: 12.8 %
LYMPHOCYTES ABSOLUTE: 0.9 THOU/MM3 (ref 1–4.8)
MAGNESIUM: 2.2 MG/DL (ref 1.6–2.4)
MCH RBC QN AUTO: 29.7 PG (ref 27–31)
MCHC RBC AUTO-ENTMCNC: 34.9 GM/DL (ref 33–37)
MCV RBC AUTO: 85.1 FL (ref 80–94)
MONOCYTES # BLD: 19.8 %
MONOCYTES ABSOLUTE: 1.3 THOU/MM3 (ref 0.4–1.3)
NUCLEATED RED BLOOD CELLS: 0 /100 WBC
PDW BLD-RTO: 15.3 % (ref 11.5–14.5)
PLATELET # BLD: 210 THOU/MM3 (ref 130–400)
PMV BLD AUTO: 7.4 MCM (ref 7.4–10.4)
POTASSIUM SERPL-SCNC: 2.8 MEQ/L (ref 3.5–5.2)
POTASSIUM SERPL-SCNC: 3 MEQ/L (ref 3.5–5.2)
RBC # BLD: 4.85 MILL/MM3 (ref 4.7–6.1)
RBC # BLD: NORMAL 10*6/UL
SEG NEUTROPHILS: 64.9 %
SEGMENTED NEUTROPHILS ABSOLUTE COUNT: 4.3 THOU/MM3 (ref 1.8–7.7)
SODIUM BLD-SCNC: 140 MEQ/L (ref 135–145)
TOTAL PROTEIN: 7 G/DL (ref 6.1–8)
TSH SERPL DL<=0.05 MIU/L-ACNC: 3.23 UIU/ML (ref 0.4–4.2)
WBC # BLD: 6.7 THOU/MM3 (ref 4.8–10.8)

## 2017-09-10 PROCEDURE — 80050 GENERAL HEALTH PANEL: CPT

## 2017-09-10 PROCEDURE — 2580000003 HC RX 258: Performed by: INTERNAL MEDICINE

## 2017-09-10 PROCEDURE — 83735 ASSAY OF MAGNESIUM: CPT

## 2017-09-10 PROCEDURE — 36415 COLL VENOUS BLD VENIPUNCTURE: CPT

## 2017-09-10 PROCEDURE — 99239 HOSP IP/OBS DSCHRG MGMT >30: CPT | Performed by: FAMILY MEDICINE

## 2017-09-10 PROCEDURE — 6360000002 HC RX W HCPCS: Performed by: INTERNAL MEDICINE

## 2017-09-10 PROCEDURE — 6360000002 HC RX W HCPCS: Performed by: FAMILY MEDICINE

## 2017-09-10 PROCEDURE — 6370000000 HC RX 637 (ALT 250 FOR IP): Performed by: NURSE PRACTITIONER

## 2017-09-10 PROCEDURE — 99232 SBSQ HOSP IP/OBS MODERATE 35: CPT | Performed by: SURGERY

## 2017-09-10 PROCEDURE — 6370000000 HC RX 637 (ALT 250 FOR IP): Performed by: FAMILY MEDICINE

## 2017-09-10 PROCEDURE — 84132 ASSAY OF SERUM POTASSIUM: CPT

## 2017-09-10 PROCEDURE — 6370000000 HC RX 637 (ALT 250 FOR IP): Performed by: INTERNAL MEDICINE

## 2017-09-10 RX ORDER — POTASSIUM CHLORIDE 20 MEQ/1
40 TABLET, EXTENDED RELEASE ORAL ONCE
Status: COMPLETED | OUTPATIENT
Start: 2017-09-10 | End: 2017-09-10

## 2017-09-10 RX ORDER — PANTOPRAZOLE SODIUM 40 MG/1
40 TABLET, DELAYED RELEASE ORAL
Qty: 30 TABLET | Refills: 0 | Status: SHIPPED | OUTPATIENT
Start: 2017-09-10 | End: 2021-04-13 | Stop reason: ALTCHOICE

## 2017-09-10 RX ORDER — PANTOPRAZOLE SODIUM 40 MG/1
40 TABLET, DELAYED RELEASE ORAL
Status: DISCONTINUED | OUTPATIENT
Start: 2017-09-10 | End: 2017-09-10 | Stop reason: HOSPADM

## 2017-09-10 RX ORDER — POTASSIUM CHLORIDE 7.45 MG/ML
10 INJECTION INTRAVENOUS
Status: COMPLETED | OUTPATIENT
Start: 2017-09-10 | End: 2017-09-10

## 2017-09-10 RX ADMIN — CLONIDINE HYDROCHLORIDE 0.2 MG: 0.2 TABLET ORAL at 13:40

## 2017-09-10 RX ADMIN — METRONIDAZOLE 500 MG: 500 TABLET, FILM COATED ORAL at 13:40

## 2017-09-10 RX ADMIN — CLONIDINE HYDROCHLORIDE 0.2 MG: 0.2 TABLET ORAL at 08:36

## 2017-09-10 RX ADMIN — LOSARTAN POTASSIUM 100 MG: 100 TABLET, FILM COATED ORAL at 08:36

## 2017-09-10 RX ADMIN — POTASSIUM CHLORIDE 40 MEQ: 1500 TABLET, EXTENDED RELEASE ORAL at 19:07

## 2017-09-10 RX ADMIN — ACETAMINOPHEN 650 MG: 325 TABLET ORAL at 01:24

## 2017-09-10 RX ADMIN — LEVOTHYROXINE SODIUM 175 MCG: 150 TABLET ORAL at 08:36

## 2017-09-10 RX ADMIN — POTASSIUM CHLORIDE 10 MEQ: 10 INJECTION, SOLUTION INTRAVENOUS at 12:47

## 2017-09-10 RX ADMIN — Medication 10 ML: at 08:37

## 2017-09-10 RX ADMIN — POTASSIUM CHLORIDE 10 MEQ: 10 INJECTION, SOLUTION INTRAVENOUS at 10:57

## 2017-09-10 RX ADMIN — POTASSIUM CHLORIDE 40 MEQ: 1500 TABLET, EXTENDED RELEASE ORAL at 10:57

## 2017-09-10 RX ADMIN — NALOXEGOL OXALATE 12.5 MG: 12.5 TABLET, FILM COATED ORAL at 10:57

## 2017-09-10 RX ADMIN — ATENOLOL 100 MG: 100 TABLET ORAL at 08:36

## 2017-09-10 RX ADMIN — ENOXAPARIN SODIUM 40 MG: 40 INJECTION SUBCUTANEOUS at 08:36

## 2017-09-10 RX ADMIN — METRONIDAZOLE 500 MG: 500 TABLET, FILM COATED ORAL at 04:25

## 2017-09-10 RX ADMIN — POLYETHYLENE GLYCOL 3350 17 G: 17 POWDER, FOR SOLUTION ORAL at 08:36

## 2017-09-10 RX ADMIN — AMLODIPINE BESYLATE 10 MG: 10 TABLET ORAL at 08:36

## 2017-09-10 RX ADMIN — CHLORTHALIDONE 25 MG: 25 TABLET ORAL at 08:36

## 2017-09-10 RX ADMIN — PANTOPRAZOLE SODIUM 40 MG: 40 TABLET, DELAYED RELEASE ORAL at 06:43

## 2017-09-10 ASSESSMENT — PAIN SCALES - GENERAL: PAINLEVEL_OUTOF10: 1

## 2017-09-11 ENCOUNTER — NURSE TRIAGE (OUTPATIENT)
Dept: ADMINISTRATIVE | Age: 57
End: 2017-09-11

## 2017-09-14 ENCOUNTER — HOSPITAL ENCOUNTER (OUTPATIENT)
Age: 57
Discharge: HOME | End: 2017-09-14
Payer: MEDICARE

## 2017-09-14 DIAGNOSIS — K56.609 SMALL BOWEL OBSTRUCTION (HCC): ICD-10-CM

## 2017-09-14 LAB
ANION GAP SERPL CALCULATED.3IONS-SCNC: 10 MEQ/L (ref 8–16)
BUN BLDV-MCNC: 9 MG/DL (ref 7–22)
CALCIUM SERPL-MCNC: 10 MG/DL (ref 8.5–10.5)
CHLORIDE BLD-SCNC: 99 MEQ/L (ref 98–111)
CO2: 33 MEQ/L (ref 23–33)
CREAT SERPL-MCNC: 0.9 MG/DL (ref 0.4–1.2)
GFR SERPL CREATININE-BSD FRML MDRD: 88 ML/MIN/1.73M2
GLUCOSE BLD-MCNC: 112 MG/DL (ref 70–108)
POTASSIUM SERPL-SCNC: 3.6 MEQ/L (ref 3.5–5.2)
SODIUM BLD-SCNC: 142 MEQ/L (ref 135–145)

## 2017-09-14 PROCEDURE — 36415 COLL VENOUS BLD VENIPUNCTURE: CPT

## 2017-09-14 PROCEDURE — 80048 BASIC METABOLIC PNL TOTAL CA: CPT

## 2017-09-20 ENCOUNTER — HOSPITAL ENCOUNTER (OUTPATIENT)
Age: 57
Discharge: HOME | End: 2017-09-20
Payer: MEDICARE

## 2017-09-20 ENCOUNTER — HOSPITAL ENCOUNTER (OUTPATIENT)
Dept: GENERAL RADIOLOGY | Age: 57
Discharge: HOME | End: 2017-09-20
Payer: MEDICARE

## 2017-09-20 DIAGNOSIS — R06.02 BREATH SHORTNESS: ICD-10-CM

## 2017-09-20 LAB
D-DIMER QUANTITATIVE: 5106 NG/ML FEU (ref 0–500)
PRO-BNP: 220 PG/ML (ref 0–900)

## 2017-09-20 PROCEDURE — 85379 FIBRIN DEGRADATION QUANT: CPT

## 2017-09-20 PROCEDURE — 71020 XR CHEST STANDARD TWO VW: CPT

## 2017-09-20 PROCEDURE — 36415 COLL VENOUS BLD VENIPUNCTURE: CPT

## 2017-09-20 PROCEDURE — 83880 ASSAY OF NATRIURETIC PEPTIDE: CPT

## 2017-09-21 ENCOUNTER — APPOINTMENT (OUTPATIENT)
Dept: CT IMAGING | Age: 57
End: 2017-09-21
Payer: MEDICARE

## 2017-09-21 ENCOUNTER — HOSPITAL ENCOUNTER (EMERGENCY)
Age: 57
Discharge: HOME | End: 2017-09-21
Attending: FAMILY MEDICINE
Payer: MEDICARE

## 2017-09-21 ENCOUNTER — APPOINTMENT (OUTPATIENT)
Dept: INTERVENTIONAL RADIOLOGY/VASCULAR | Age: 57
End: 2017-09-21
Payer: MEDICARE

## 2017-09-21 VITALS
TEMPERATURE: 98 F | BODY MASS INDEX: 44.2 KG/M2 | HEART RATE: 67 BPM | DIASTOLIC BLOOD PRESSURE: 90 MMHG | RESPIRATION RATE: 16 BRPM | WEIGHT: 315 LBS | SYSTOLIC BLOOD PRESSURE: 149 MMHG | OXYGEN SATURATION: 96 %

## 2017-09-21 DIAGNOSIS — J20.9 ACUTE BRONCHITIS, UNSPECIFIED ORGANISM: ICD-10-CM

## 2017-09-21 DIAGNOSIS — J90 PLEURAL EFFUSION, BILATERAL: Primary | ICD-10-CM

## 2017-09-21 LAB
ANION GAP SERPL CALCULATED.3IONS-SCNC: 11 MEQ/L (ref 8–16)
ANISOCYTOSIS: ABNORMAL
APTT: 28.9 SECONDS (ref 22–38)
BASOPHILS # BLD: 0.5 %
BASOPHILS ABSOLUTE: 0 THOU/MM3 (ref 0–0.1)
BUN BLDV-MCNC: 11 MG/DL (ref 7–22)
CALCIUM SERPL-MCNC: 9 MG/DL (ref 8.5–10.5)
CHLORIDE BLD-SCNC: 100 MEQ/L (ref 98–111)
CO2: 29 MEQ/L (ref 23–33)
CREAT SERPL-MCNC: 0.9 MG/DL (ref 0.4–1.2)
EOSINOPHIL # BLD: 4.7 %
EOSINOPHILS ABSOLUTE: 0.4 THOU/MM3 (ref 0–0.4)
GFR SERPL CREATININE-BSD FRML MDRD: 88 ML/MIN/1.73M2
GLUCOSE BLD-MCNC: 146 MG/DL (ref 70–108)
HCT VFR BLD CALC: 37.4 % (ref 42–52)
HEMOGLOBIN: 12.9 GM/DL (ref 14–18)
INR BLD: 1.15 (ref 0.85–1.13)
LYMPHOCYTES # BLD: 11.2 %
LYMPHOCYTES ABSOLUTE: 1.1 THOU/MM3 (ref 1–4.8)
MCH RBC QN AUTO: 29.4 PG (ref 27–31)
MCHC RBC AUTO-ENTMCNC: 34.4 GM/DL (ref 33–37)
MCV RBC AUTO: 85.6 FL (ref 80–94)
MONOCYTES # BLD: 9.1 %
MONOCYTES ABSOLUTE: 0.9 THOU/MM3 (ref 0.4–1.3)
NUCLEATED RED BLOOD CELLS: 0 /100 WBC
OSMOLALITY CALCULATION: 281.4 MOSMOL/KG (ref 275–300)
PDW BLD-RTO: 15.2 % (ref 11.5–14.5)
PLATELET # BLD: 482 THOU/MM3 (ref 130–400)
PMV BLD AUTO: 6.7 MCM (ref 7.4–10.4)
POTASSIUM SERPL-SCNC: 3.1 MEQ/L (ref 3.5–5.2)
RBC # BLD: 4.37 MILL/MM3 (ref 4.7–6.1)
RBC # BLD: NORMAL 10*6/UL
SEG NEUTROPHILS: 74.5 %
SEGMENTED NEUTROPHILS ABSOLUTE COUNT: 7.1 THOU/MM3 (ref 1.8–7.7)
SODIUM BLD-SCNC: 140 MEQ/L (ref 135–145)
WBC # BLD: 9.5 THOU/MM3 (ref 4.8–10.8)

## 2017-09-21 PROCEDURE — 36415 COLL VENOUS BLD VENIPUNCTURE: CPT

## 2017-09-21 PROCEDURE — 85730 THROMBOPLASTIN TIME PARTIAL: CPT

## 2017-09-21 PROCEDURE — 96374 THER/PROPH/DIAG INJ IV PUSH: CPT

## 2017-09-21 PROCEDURE — 6360000004 HC RX CONTRAST MEDICATION: Performed by: FAMILY MEDICINE

## 2017-09-21 PROCEDURE — 85025 COMPLETE CBC W/AUTO DIFF WBC: CPT

## 2017-09-21 PROCEDURE — 99285 EMERGENCY DEPT VISIT HI MDM: CPT

## 2017-09-21 PROCEDURE — 6370000000 HC RX 637 (ALT 250 FOR IP): Performed by: FAMILY MEDICINE

## 2017-09-21 PROCEDURE — 6360000002 HC RX W HCPCS: Performed by: FAMILY MEDICINE

## 2017-09-21 PROCEDURE — 2580000003 HC RX 258: Performed by: FAMILY MEDICINE

## 2017-09-21 PROCEDURE — 85610 PROTHROMBIN TIME: CPT

## 2017-09-21 PROCEDURE — 93970 EXTREMITY STUDY: CPT

## 2017-09-21 PROCEDURE — 71275 CT ANGIOGRAPHY CHEST: CPT

## 2017-09-21 PROCEDURE — 80048 BASIC METABOLIC PNL TOTAL CA: CPT

## 2017-09-21 RX ORDER — LEVOFLOXACIN 500 MG/1
500 TABLET, FILM COATED ORAL ONCE
Status: COMPLETED | OUTPATIENT
Start: 2017-09-21 | End: 2017-09-21

## 2017-09-21 RX ORDER — OXYCODONE AND ACETAMINOPHEN 10; 325 MG/1; MG/1
1 TABLET ORAL EVERY 6 HOURS PRN
Status: ON HOLD | COMMUNITY
End: 2018-02-11 | Stop reason: HOSPADM

## 2017-09-21 RX ORDER — SODIUM CHLORIDE 9 MG/ML
INJECTION, SOLUTION INTRAVENOUS CONTINUOUS
Status: DISCONTINUED | OUTPATIENT
Start: 2017-09-21 | End: 2017-09-21 | Stop reason: HOSPADM

## 2017-09-21 RX ORDER — KETOROLAC TROMETHAMINE 30 MG/ML
30 INJECTION, SOLUTION INTRAMUSCULAR; INTRAVENOUS ONCE
Status: COMPLETED | OUTPATIENT
Start: 2017-09-21 | End: 2017-09-21

## 2017-09-21 RX ORDER — LEVOFLOXACIN 500 MG/1
500 TABLET, FILM COATED ORAL DAILY
Qty: 6 TABLET | Refills: 0 | Status: SHIPPED | OUTPATIENT
Start: 2017-09-21 | End: 2017-09-27

## 2017-09-21 RX ADMIN — KETOROLAC TROMETHAMINE 30 MG: 30 INJECTION, SOLUTION INTRAMUSCULAR at 13:03

## 2017-09-21 RX ADMIN — SODIUM CHLORIDE: 9 INJECTION, SOLUTION INTRAVENOUS at 11:23

## 2017-09-21 RX ADMIN — IOPAMIDOL 80 ML: 755 INJECTION, SOLUTION INTRAVENOUS at 12:15

## 2017-09-21 RX ADMIN — LEVOFLOXACIN 500 MG: 500 TABLET, FILM COATED ORAL at 15:28

## 2017-09-21 ASSESSMENT — ENCOUNTER SYMPTOMS
SHORTNESS OF BREATH: 1
ABDOMINAL PAIN: 0
COUGH: 1
EYE DISCHARGE: 0

## 2017-09-21 ASSESSMENT — PAIN DESCRIPTION - PAIN TYPE: TYPE: CHRONIC PAIN

## 2017-09-21 ASSESSMENT — PAIN SCALES - GENERAL: PAINLEVEL_OUTOF10: 5

## 2017-09-26 ENCOUNTER — HOSPITAL ENCOUNTER (OUTPATIENT)
Age: 57
Discharge: HOME | End: 2017-09-26
Payer: MEDICARE

## 2017-09-26 ENCOUNTER — HOSPITAL ENCOUNTER (OUTPATIENT)
Dept: GENERAL RADIOLOGY | Age: 57
Discharge: HOME | End: 2017-09-26
Payer: MEDICARE

## 2017-09-26 DIAGNOSIS — J18.9 PNEUMONIA, ORGANISM UNSPECIFIED(486): ICD-10-CM

## 2017-09-26 PROCEDURE — 71020 XR CHEST STANDARD TWO VW: CPT

## 2017-10-31 RX ORDER — OXYBUTYNIN CHLORIDE 5 MG/1
5 TABLET ORAL 3 TIMES DAILY
Qty: 90 TABLET | Refills: 3 | Status: SHIPPED | OUTPATIENT
Start: 2017-10-31 | End: 2017-12-13 | Stop reason: SDUPTHER

## 2017-12-01 ENCOUNTER — HOSPITAL ENCOUNTER (OUTPATIENT)
Age: 57
Discharge: HOME | End: 2017-12-01
Payer: MEDICARE

## 2017-12-01 LAB
ALBUMIN SERPL-MCNC: 4.1 G/DL (ref 3.5–5.1)
ALP BLD-CCNC: 72 U/L (ref 38–126)
ALT SERPL-CCNC: 32 U/L (ref 11–66)
ANION GAP SERPL CALCULATED.3IONS-SCNC: 12 MEQ/L (ref 8–16)
ANISOCYTOSIS: ABNORMAL
AST SERPL-CCNC: 26 U/L (ref 5–40)
AVERAGE GLUCOSE: 144 MG/DL (ref 70–126)
BASOPHILS # BLD: 1.2 %
BASOPHILS ABSOLUTE: 0.1 THOU/MM3 (ref 0–0.1)
BILIRUB SERPL-MCNC: 0.5 MG/DL (ref 0.3–1.2)
BUN BLDV-MCNC: 16 MG/DL (ref 7–22)
CALCIUM SERPL-MCNC: 9.5 MG/DL (ref 8.5–10.5)
CHLORIDE BLD-SCNC: 100 MEQ/L (ref 98–111)
CHOLESTEROL, TOTAL: 161 MG/DL (ref 100–199)
CO2: 33 MEQ/L (ref 23–33)
CREAT SERPL-MCNC: 1 MG/DL (ref 0.4–1.2)
EOSINOPHIL # BLD: 6.1 %
EOSINOPHILS ABSOLUTE: 0.4 THOU/MM3 (ref 0–0.4)
GFR SERPL CREATININE-BSD FRML MDRD: 78 ML/MIN/1.73M2
GLUCOSE BLD-MCNC: 138 MG/DL (ref 70–108)
HBA1C MFR BLD: 6.8 % (ref 4.4–6.4)
HCT VFR BLD CALC: 43.1 % (ref 42–52)
HDLC SERPL-MCNC: 33 MG/DL
HEMOGLOBIN: 14.5 GM/DL (ref 14–18)
HEPATITIS C ANTIBODY: NEGATIVE
LDL CHOLESTEROL CALCULATED: 104 MG/DL
LYMPHOCYTES # BLD: 17.3 %
LYMPHOCYTES ABSOLUTE: 1.2 THOU/MM3 (ref 1–4.8)
MCH RBC QN AUTO: 28 PG (ref 27–31)
MCHC RBC AUTO-ENTMCNC: 33.7 GM/DL (ref 33–37)
MCV RBC AUTO: 82.9 FL (ref 80–94)
MONOCYTES # BLD: 10.3 %
MONOCYTES ABSOLUTE: 0.7 THOU/MM3 (ref 0.4–1.3)
NUCLEATED RED BLOOD CELLS: 0 /100 WBC
PDW BLD-RTO: 16.4 % (ref 11.5–14.5)
PLATELET # BLD: 283 THOU/MM3 (ref 130–400)
PMV BLD AUTO: 8 MCM (ref 7.4–10.4)
POTASSIUM SERPL-SCNC: 2.9 MEQ/L (ref 3.5–5.2)
RBC # BLD: 5.2 MILL/MM3 (ref 4.7–6.1)
SEG NEUTROPHILS: 65.1 %
SEGMENTED NEUTROPHILS ABSOLUTE COUNT: 4.6 THOU/MM3 (ref 1.8–7.7)
SODIUM BLD-SCNC: 145 MEQ/L (ref 135–145)
T3 FREE: 2.97 PG/ML (ref 2.02–4.43)
T4 FREE: 1.19 NG/DL (ref 0.93–1.76)
TOTAL PROTEIN: 7.5 G/DL (ref 6.1–8)
TRIGL SERPL-MCNC: 120 MG/DL (ref 0–199)
TSH SERPL DL<=0.05 MIU/L-ACNC: 3.63 UIU/ML (ref 0.4–4.2)
WBC # BLD: 7.1 THOU/MM3 (ref 4.8–10.8)

## 2017-12-01 PROCEDURE — 84439 ASSAY OF FREE THYROXINE: CPT

## 2017-12-01 PROCEDURE — 83036 HEMOGLOBIN GLYCOSYLATED A1C: CPT

## 2017-12-01 PROCEDURE — 80061 LIPID PANEL: CPT

## 2017-12-01 PROCEDURE — 84481 FREE ASSAY (FT-3): CPT

## 2017-12-01 PROCEDURE — 36415 COLL VENOUS BLD VENIPUNCTURE: CPT

## 2017-12-01 PROCEDURE — 86803 HEPATITIS C AB TEST: CPT

## 2017-12-01 PROCEDURE — 80050 GENERAL HEALTH PANEL: CPT

## 2017-12-12 ENCOUNTER — TELEPHONE (OUTPATIENT)
Dept: UROLOGY | Age: 57
End: 2017-12-12

## 2017-12-12 NOTE — TELEPHONE ENCOUNTER
Maranda Myles would like a refill of the following medications:   oxybutynin (DITROPAN) 5 MG tablet Betty Lazcano CNP]     Preferred pharmacy: [facility] #[id number] [place], [place] - [address] [place], [place] [phone]     DOLV 07/25/2017  DONV 07/24/2018    Please Advise, thank you.

## 2017-12-13 RX ORDER — OXYBUTYNIN CHLORIDE 5 MG/1
5 TABLET ORAL 3 TIMES DAILY
Qty: 90 TABLET | Refills: 3 | Status: SHIPPED | OUTPATIENT
Start: 2017-12-13 | End: 2018-10-12 | Stop reason: SDUPTHER

## 2017-12-13 RX ORDER — TAMSULOSIN HYDROCHLORIDE 0.4 MG/1
0.4 CAPSULE ORAL NIGHTLY
Qty: 90 CAPSULE | Refills: 3 | Status: SHIPPED | OUTPATIENT
Start: 2017-12-13 | End: 2018-07-24 | Stop reason: SDUPTHER

## 2018-01-23 ENCOUNTER — OFFICE VISIT (OUTPATIENT)
Dept: SURGERY | Age: 58
End: 2018-01-23
Payer: MEDICARE

## 2018-01-23 VITALS
HEART RATE: 72 BPM | SYSTOLIC BLOOD PRESSURE: 154 MMHG | BODY MASS INDEX: 41.75 KG/M2 | RESPIRATION RATE: 20 BRPM | WEIGHT: 315 LBS | HEIGHT: 73 IN | TEMPERATURE: 99.6 F | DIASTOLIC BLOOD PRESSURE: 96 MMHG

## 2018-01-23 DIAGNOSIS — K59.00 CONSTIPATION, UNSPECIFIED CONSTIPATION TYPE: ICD-10-CM

## 2018-01-23 DIAGNOSIS — K56.609 SMALL BOWEL OBSTRUCTION (HCC): Primary | ICD-10-CM

## 2018-01-23 PROCEDURE — G8484 FLU IMMUNIZE NO ADMIN: HCPCS | Performed by: SURGERY

## 2018-01-23 PROCEDURE — 4004F PT TOBACCO SCREEN RCVD TLK: CPT | Performed by: SURGERY

## 2018-01-23 PROCEDURE — 99214 OFFICE O/P EST MOD 30 MIN: CPT | Performed by: SURGERY

## 2018-01-23 PROCEDURE — 3017F COLORECTAL CA SCREEN DOC REV: CPT | Performed by: SURGERY

## 2018-01-23 PROCEDURE — G8417 CALC BMI ABV UP PARAM F/U: HCPCS | Performed by: SURGERY

## 2018-01-23 PROCEDURE — G8427 DOCREV CUR MEDS BY ELIG CLIN: HCPCS | Performed by: SURGERY

## 2018-01-23 NOTE — PROGRESS NOTES
Levothyroxine Sodium (SYNTHROID PO) Take 175 mcg by mouth daily Dosage unknown        No current facility-administered medications for this visit. Allergies   Allergen Reactions    Shellfish-Derived Products Swelling    Pcn [Penicillins] Itching       Subjective:      Review of Systems   Constitutional: Positive for appetite change, diaphoresis and fever. Negative for activity change, chills, fatigue and unexpected weight change. HENT: Positive for sinus pressure and tinnitus. Negative for congestion, dental problem, drooling, ear discharge, ear pain, facial swelling, hearing loss, mouth sores, nosebleeds, postnasal drip, rhinorrhea, sinus pain, sneezing, sore throat, trouble swallowing and voice change. Eyes: Negative for photophobia, pain, discharge, redness, itching and visual disturbance. Respiratory: Positive for apnea. Negative for cough, choking, chest tightness, shortness of breath, wheezing and stridor. Cardiovascular: Positive for leg swelling. Negative for chest pain and palpitations. Gastrointestinal: Positive for abdominal distention, constipation and vomiting. Endocrine: Positive for polydipsia. Negative for cold intolerance, heat intolerance, polyphagia and polyuria. Genitourinary: Negative for decreased urine volume, difficulty urinating, discharge, dysuria, enuresis, flank pain, frequency, genital sores, hematuria, penile pain, penile swelling, scrotal swelling, testicular pain and urgency. Musculoskeletal: Positive for back pain and gait problem. Negative for arthralgias, joint swelling, myalgias, neck pain and neck stiffness. Skin: Negative for color change, pallor, rash and wound. Allergic/Immunologic: Positive for food allergies. Negative for environmental allergies and immunocompromised state. Neurological: Negative for dizziness, tremors, seizures, syncope, facial asymmetry, speech difficulty, weakness, light-headedness, numbness and headaches.    Hematological: Negative for adenopathy. Does not bruise/bleed easily. Psychiatric/Behavioral: Negative for agitation, behavioral problems, confusion, decreased concentration, dysphoric mood, hallucinations, self-injury, sleep disturbance and suicidal ideas. The patient is not nervous/anxious and is not hyperactive. Objective:   BP (!) 154/96 (Site: Left Arm, Position: Sitting, Cuff Size: Medium Adult)   Pulse 72   Temp 99.6 °F (37.6 °C) (Tympanic)   Resp 20   Ht 6' 1\" (1.854 m)   Wt (!) 336 lb (152.4 kg)   BMI 44.33 kg/m²     Physical Exam   Constitutional: He is oriented to person, place, and time. He appears well-developed and well-nourished. HENT:   Head: Normocephalic and atraumatic. Eyes: Conjunctivae and EOM are normal. Pupils are equal, round, and reactive to light. Left eye exhibits no discharge. No scleral icterus. Neck: No tracheal deviation present. No thyromegaly present. Cardiovascular: Normal rate, regular rhythm and normal heart sounds. Exam reveals no gallop and no friction rub. No murmur heard. Pulmonary/Chest: Effort normal and breath sounds normal. No respiratory distress. He has no wheezes. He has no rales. He exhibits no tenderness. Abdominal: He exhibits distension. There is tenderness. Musculoskeletal: Normal range of motion. He exhibits no edema or tenderness. Lymphadenopathy:     He has no cervical adenopathy. Neurological: He is alert and oriented to person, place, and time. Skin: Skin is warm and dry. No rash noted. No erythema. No pallor. Psychiatric: He has a normal mood and affect.  His behavior is normal. Judgment and thought content normal.       Results for orders placed or performed during the hospital encounter of 12/01/17   Lipid Panel   Result Value Ref Range    Cholesterol, Total 161 100 - 199 mg/dL    Triglycerides 120 0 - 199 mg/dL    HDL 33 mg/dL    LDL Calculated 104 mg/dL   Comprehensive Metabolic Panel   Result Value Ref Range    Glucose 138 (H) 70 - 108 mg/dL    CREATININE 1.0 0.4 - 1.2 mg/dL    BUN 16 7 - 22 mg/dL    Sodium 145 135 - 145 meq/L    Potassium 2.9 (L) 3.5 - 5.2 meq/L    Chloride 100 98 - 111 meq/L    CO2 33 23 - 33 meq/L    Calcium 9.5 8.5 - 10.5 mg/dL    AST 26 5 - 40 U/L    Alkaline Phosphatase 72 38 - 126 U/L    Total Protein 7.5 6.1 - 8.0 g/dL    Alb 4.1 3.5 - 5.1 g/dL    Total Bilirubin 0.5 0.3 - 1.2 mg/dL    ALT 32 11 - 66 U/L   CBC Auto Differential   Result Value Ref Range    WBC 7.1 4.8 - 10.8 thou/mm3    RBC 5.20 4.70 - 6.10 mill/mm3    Hemoglobin 14.5 14.0 - 18.0 gm/dl    Hematocrit 43.1 42.0 - 52.0 %    MCV 82.9 80.0 - 94.0 fL    MCH 28.0 27.0 - 31.0 pg    MCHC 33.7 33.0 - 37.0 gm/dl    RDW 16.4 (H) 11.5 - 14.5 %    Platelets 324 939 - 364 thou/mm3    MPV 8.0 7.4 - 10.4 mcm    Seg Neutrophils 65.1 %    Lymphocytes 17.3 %    Monocytes 10.3 %    Eosinophils 6.1 %    Basophils 1.2 %    nRBC 0 /100 wbc    Anisocytosis 1+ Absent    Segs Absolute 4.6 1.8 - 7.7 thou/mm3    Lymphocytes # 1.2 1.0 - 4.8 thou/mm3    Monocytes # 0.7 0.4 - 1.3 thou/mm3    Eosinophils # 0.4 0.0 - 0.4 thou/mm3    Basophils # 0.1 0.0 - 0.1 thou/mm3   Hepatitis C Antibody   Result Value Ref Range    Hepatitis C Ab Negative    T4, Free   Result Value Ref Range    T4 Free 1.19 0.93 - 1.76 ng/dL   T3, Free   Result Value Ref Range    T3, Free 2.97 2.02 - 4.43 pg/mL   TSH without Reflex   Result Value Ref Range    TSH 3.630 0.400 - 4.20 uIU/mL   Hemoglobin A1C   Result Value Ref Range    Hemoglobin A1C 6.8 (H) 4.4 - 6.4 %    AVERAGE GLUCOSE 144 (H) 70 - 126 mg/dL   Glomerular Filtration Rate, Estimated   Result Value Ref Range    Est, Glom Filt Rate 78 (A) ml/min/1.73m2   Anion Gap   Result Value Ref Range    Anion Gap 12.0 8.0 - 16.0 meq/L   Patient with recurring partial small bowel obstructions    Assessment:     1.   Patient with recurring small bowel obstructions with last admission in September 2017 he states he's had multiple episodes of abdominal cramps and nausea

## 2018-01-25 ENCOUNTER — OFFICE VISIT (OUTPATIENT)
Dept: PULMONOLOGY | Age: 58
End: 2018-01-25
Payer: MEDICARE

## 2018-01-25 ENCOUNTER — TELEPHONE (OUTPATIENT)
Dept: SURGERY | Age: 58
End: 2018-01-25

## 2018-01-25 VITALS
DIASTOLIC BLOOD PRESSURE: 88 MMHG | SYSTOLIC BLOOD PRESSURE: 138 MMHG | WEIGHT: 315 LBS | HEART RATE: 59 BPM | BODY MASS INDEX: 41.75 KG/M2 | HEIGHT: 73 IN | OXYGEN SATURATION: 96 %

## 2018-01-25 DIAGNOSIS — E66.01 MORBID OBESITY WITH BMI OF 40.0-44.9, ADULT (HCC): ICD-10-CM

## 2018-01-25 DIAGNOSIS — G47.33 OBSTRUCTIVE SLEEP APNEA ON CPAP: Primary | ICD-10-CM

## 2018-01-25 DIAGNOSIS — Z99.89 OBSTRUCTIVE SLEEP APNEA ON CPAP: Primary | ICD-10-CM

## 2018-01-25 DIAGNOSIS — E66.01 OBESITY, CLASS III, BMI 40-49.9 (MORBID OBESITY) (HCC): ICD-10-CM

## 2018-01-25 PROCEDURE — G8484 FLU IMMUNIZE NO ADMIN: HCPCS | Performed by: PHYSICIAN ASSISTANT

## 2018-01-25 PROCEDURE — G8417 CALC BMI ABV UP PARAM F/U: HCPCS | Performed by: PHYSICIAN ASSISTANT

## 2018-01-25 PROCEDURE — 4004F PT TOBACCO SCREEN RCVD TLK: CPT | Performed by: PHYSICIAN ASSISTANT

## 2018-01-25 PROCEDURE — 3017F COLORECTAL CA SCREEN DOC REV: CPT | Performed by: PHYSICIAN ASSISTANT

## 2018-01-25 PROCEDURE — G8427 DOCREV CUR MEDS BY ELIG CLIN: HCPCS | Performed by: PHYSICIAN ASSISTANT

## 2018-01-25 PROCEDURE — 99213 OFFICE O/P EST LOW 20 MIN: CPT | Performed by: PHYSICIAN ASSISTANT

## 2018-01-25 ASSESSMENT — ENCOUNTER SYMPTOMS
ABDOMINAL DISTENTION: 1
SORE THROAT: 0
CHEST TIGHTNESS: 0
SINUS PRESSURE: 1
APNEA: 1
VOMITING: 1
EYE ITCHING: 0
RHINORRHEA: 0
PHOTOPHOBIA: 0
WHEEZING: 0
COLOR CHANGE: 0
EYE REDNESS: 0
TROUBLE SWALLOWING: 0
EYE PAIN: 0
SHORTNESS OF BREATH: 0
CHOKING: 0
EYE DISCHARGE: 0
CONSTIPATION: 1
COUGH: 0
VOICE CHANGE: 0
SINUS PAIN: 0
BACK PAIN: 1
FACIAL SWELLING: 0
STRIDOR: 0

## 2018-01-26 ENCOUNTER — TELEPHONE (OUTPATIENT)
Dept: SURGERY | Age: 58
End: 2018-01-26

## 2018-01-29 ENCOUNTER — ANESTHESIA EVENT (OUTPATIENT)
Dept: OPERATING ROOM | Age: 58
DRG: 329 | End: 2018-01-29
Payer: MEDICARE

## 2018-01-29 RX ORDER — LEVOTHYROXINE SODIUM 175 UG/1
175 TABLET ORAL DAILY
Status: ON HOLD | COMMUNITY
End: 2021-03-03 | Stop reason: SDUPTHER

## 2018-01-29 RX ORDER — CLONIDINE HYDROCHLORIDE 0.2 MG/1
0.2 TABLET ORAL 3 TIMES DAILY
Status: ON HOLD | COMMUNITY
End: 2021-03-03 | Stop reason: SDUPTHER

## 2018-01-29 NOTE — PROGRESS NOTES
NPO after midnight  [employer] and drivers license  Wear comfortable clean clothing  Do not bring jewelry   Shower night before and morning of surgery with a liquid antibacterial soap  Bring medications in original bottles  Follow all instructions given by your physician   needed at discharge  Bring CPAP  Medication instructions for surgery per Dr. Elvira Donovan

## 2018-02-05 ENCOUNTER — HOSPITAL ENCOUNTER (INPATIENT)
Age: 58
LOS: 7 days | Discharge: HOME | DRG: 329 | End: 2018-02-12
Attending: SURGERY | Admitting: SURGERY
Payer: MEDICARE

## 2018-02-05 ENCOUNTER — ANESTHESIA (OUTPATIENT)
Dept: OPERATING ROOM | Age: 58
DRG: 329 | End: 2018-02-05
Payer: MEDICARE

## 2018-02-05 VITALS — OXYGEN SATURATION: 98 % | SYSTOLIC BLOOD PRESSURE: 99 MMHG | DIASTOLIC BLOOD PRESSURE: 65 MMHG | TEMPERATURE: 101.1 F

## 2018-02-05 DIAGNOSIS — E87.6 HYPOKALEMIA: ICD-10-CM

## 2018-02-05 DIAGNOSIS — N17.0 ACUTE KIDNEY FAILURE WITH TUBULAR NECROSIS (HCC): ICD-10-CM

## 2018-02-05 DIAGNOSIS — K56.609 SMALL BOWEL OBSTRUCTION (HCC): Primary | ICD-10-CM

## 2018-02-05 LAB
ABO: NORMAL
ANTIBODY SCREEN: NORMAL
RH FACTOR: NORMAL

## 2018-02-05 PROCEDURE — 86850 RBC ANTIBODY SCREEN: CPT

## 2018-02-05 PROCEDURE — 0DQK0ZZ REPAIR ASCENDING COLON, OPEN APPROACH: ICD-10-PCS | Performed by: SURGERY

## 2018-02-05 PROCEDURE — S0028 INJECTION, FAMOTIDINE, 20 MG: HCPCS | Performed by: SURGERY

## 2018-02-05 PROCEDURE — 6360000002 HC RX W HCPCS

## 2018-02-05 PROCEDURE — 7100000000 HC PACU RECOVERY - FIRST 15 MIN: Performed by: SURGERY

## 2018-02-05 PROCEDURE — A6252 ABSORPT DRG >16 <=48 W/O BDR: HCPCS | Performed by: SURGERY

## 2018-02-05 PROCEDURE — 6360000002 HC RX W HCPCS: Performed by: SURGERY

## 2018-02-05 PROCEDURE — 6370000000 HC RX 637 (ALT 250 FOR IP): Performed by: SURGERY

## 2018-02-05 PROCEDURE — 0DN80ZZ RELEASE SMALL INTESTINE, OPEN APPROACH: ICD-10-PCS | Performed by: SURGERY

## 2018-02-05 PROCEDURE — 3600000014 HC SURGERY LEVEL 4 ADDTL 15MIN: Performed by: SURGERY

## 2018-02-05 PROCEDURE — 7100000001 HC PACU RECOVERY - ADDTL 15 MIN: Performed by: SURGERY

## 2018-02-05 PROCEDURE — 3700000001 HC ADD 15 MINUTES (ANESTHESIA): Performed by: SURGERY

## 2018-02-05 PROCEDURE — 0DBL0ZZ EXCISION OF TRANSVERSE COLON, OPEN APPROACH: ICD-10-PCS | Performed by: SURGERY

## 2018-02-05 PROCEDURE — 2720000010 HC SURG SUPPLY STERILE: Performed by: SURGERY

## 2018-02-05 PROCEDURE — 88307 TISSUE EXAM BY PATHOLOGIST: CPT

## 2018-02-05 PROCEDURE — 2580000003 HC RX 258

## 2018-02-05 PROCEDURE — 1200000000 HC SEMI PRIVATE

## 2018-02-05 PROCEDURE — C9250 ARTISS FIBRIN SEALANT: HCPCS | Performed by: SURGERY

## 2018-02-05 PROCEDURE — 0DBK0ZZ EXCISION OF ASCENDING COLON, OPEN APPROACH: ICD-10-PCS | Performed by: SURGERY

## 2018-02-05 PROCEDURE — 6360000002 HC RX W HCPCS: Performed by: ANESTHESIOLOGY

## 2018-02-05 PROCEDURE — 86900 BLOOD TYPING SEROLOGIC ABO: CPT

## 2018-02-05 PROCEDURE — 44140 PARTIAL REMOVAL OF COLON: CPT | Performed by: SURGERY

## 2018-02-05 PROCEDURE — 94762 N-INVAS EAR/PLS OXIMTRY CONT: CPT

## 2018-02-05 PROCEDURE — 2500000003 HC RX 250 WO HCPCS: Performed by: SURGERY

## 2018-02-05 PROCEDURE — 2500000003 HC RX 250 WO HCPCS: Performed by: REGISTERED NURSE

## 2018-02-05 PROCEDURE — 6370000000 HC RX 637 (ALT 250 FOR IP)

## 2018-02-05 PROCEDURE — 2700000000 HC OXYGEN THERAPY PER DAY

## 2018-02-05 PROCEDURE — 2580000003 HC RX 258: Performed by: SURGERY

## 2018-02-05 PROCEDURE — 86901 BLOOD TYPING SEROLOGIC RH(D): CPT

## 2018-02-05 PROCEDURE — 3600000004 HC SURGERY LEVEL 4 BASE: Performed by: SURGERY

## 2018-02-05 PROCEDURE — 6360000002 HC RX W HCPCS: Performed by: REGISTERED NURSE

## 2018-02-05 PROCEDURE — 3700000000 HC ANESTHESIA ATTENDED CARE: Performed by: SURGERY

## 2018-02-05 PROCEDURE — 0DBF0ZZ EXCISION OF RIGHT LARGE INTESTINE, OPEN APPROACH: ICD-10-PCS | Performed by: SURGERY

## 2018-02-05 PROCEDURE — 36415 COLL VENOUS BLD VENIPUNCTURE: CPT

## 2018-02-05 PROCEDURE — 2780000010 HC IMPLANT OTHER: Performed by: SURGERY

## 2018-02-05 RX ORDER — CIPROFLOXACIN 2 MG/ML
400 INJECTION, SOLUTION INTRAVENOUS
Status: COMPLETED | OUTPATIENT
Start: 2018-02-05 | End: 2018-02-05

## 2018-02-05 RX ORDER — DEXAMETHASONE SODIUM PHOSPHATE 4 MG/ML
INJECTION, SOLUTION INTRA-ARTICULAR; INTRALESIONAL; INTRAMUSCULAR; INTRAVENOUS; SOFT TISSUE PRN
Status: DISCONTINUED | OUTPATIENT
Start: 2018-02-05 | End: 2018-02-05 | Stop reason: SDUPTHER

## 2018-02-05 RX ORDER — ATENOLOL 100 MG/1
100 TABLET ORAL DAILY
Status: DISCONTINUED | OUTPATIENT
Start: 2018-02-06 | End: 2018-02-12 | Stop reason: HOSPADM

## 2018-02-05 RX ORDER — SODIUM CHLORIDE 9 MG/ML
INJECTION, SOLUTION INTRAVENOUS CONTINUOUS
Status: DISCONTINUED | OUTPATIENT
Start: 2018-02-05 | End: 2018-02-05

## 2018-02-05 RX ORDER — ALVIMOPAN 12 MG/1
CAPSULE ORAL
Status: COMPLETED
Start: 2018-02-05 | End: 2018-02-05

## 2018-02-05 RX ORDER — ATENOLOL AND CHLORTHALIDONE TABLET 100; 25 MG/1; MG/1
1 TABLET ORAL DAILY
Status: DISCONTINUED | OUTPATIENT
Start: 2018-02-05 | End: 2018-02-05 | Stop reason: CLARIF

## 2018-02-05 RX ORDER — SODIUM CHLORIDE 9 MG/ML
INJECTION, SOLUTION INTRAVENOUS CONTINUOUS
Status: DISCONTINUED | OUTPATIENT
Start: 2018-02-05 | End: 2018-02-07

## 2018-02-05 RX ORDER — KETOROLAC TROMETHAMINE 30 MG/ML
30 INJECTION, SOLUTION INTRAMUSCULAR; INTRAVENOUS EVERY 6 HOURS PRN
Status: DISCONTINUED | OUTPATIENT
Start: 2018-02-05 | End: 2018-02-06

## 2018-02-05 RX ORDER — ALBUTEROL SULFATE 90 UG/1
1 AEROSOL, METERED RESPIRATORY (INHALATION) EVERY 6 HOURS PRN
Status: DISCONTINUED | OUTPATIENT
Start: 2018-02-05 | End: 2018-02-12 | Stop reason: HOSPADM

## 2018-02-05 RX ORDER — ACETAMINOPHEN 325 MG/1
650 TABLET ORAL EVERY 4 HOURS PRN
Status: DISCONTINUED | OUTPATIENT
Start: 2018-02-05 | End: 2018-02-12 | Stop reason: HOSPADM

## 2018-02-05 RX ORDER — PROPOFOL 10 MG/ML
INJECTION, EMULSION INTRAVENOUS PRN
Status: DISCONTINUED | OUTPATIENT
Start: 2018-02-05 | End: 2018-02-05 | Stop reason: SDUPTHER

## 2018-02-05 RX ORDER — MIDAZOLAM HYDROCHLORIDE 1 MG/ML
INJECTION INTRAMUSCULAR; INTRAVENOUS PRN
Status: DISCONTINUED | OUTPATIENT
Start: 2018-02-05 | End: 2018-02-05 | Stop reason: SDUPTHER

## 2018-02-05 RX ORDER — FENTANYL CITRATE 50 UG/ML
INJECTION, SOLUTION INTRAMUSCULAR; INTRAVENOUS
Status: COMPLETED
Start: 2018-02-05 | End: 2018-02-05

## 2018-02-05 RX ORDER — SUCCINYLCHOLINE CHLORIDE 20 MG/ML
INJECTION INTRAMUSCULAR; INTRAVENOUS PRN
Status: DISCONTINUED | OUTPATIENT
Start: 2018-02-05 | End: 2018-02-05 | Stop reason: SDUPTHER

## 2018-02-05 RX ORDER — LABETALOL HYDROCHLORIDE 5 MG/ML
INJECTION, SOLUTION INTRAVENOUS PRN
Status: DISCONTINUED | OUTPATIENT
Start: 2018-02-05 | End: 2018-02-05 | Stop reason: SDUPTHER

## 2018-02-05 RX ORDER — ALVIMOPAN 12 MG/1
12 CAPSULE ORAL 2 TIMES DAILY
Status: DISCONTINUED | OUTPATIENT
Start: 2018-02-06 | End: 2018-02-05

## 2018-02-05 RX ORDER — CHLORTHALIDONE 25 MG/1
25 TABLET ORAL DAILY
Status: DISCONTINUED | OUTPATIENT
Start: 2018-02-06 | End: 2018-02-07

## 2018-02-05 RX ORDER — PROMETHAZINE HYDROCHLORIDE 25 MG/ML
12.5 INJECTION, SOLUTION INTRAMUSCULAR; INTRAVENOUS
Status: COMPLETED | OUTPATIENT
Start: 2018-02-05 | End: 2018-02-05

## 2018-02-05 RX ORDER — ONDANSETRON 2 MG/ML
INJECTION INTRAMUSCULAR; INTRAVENOUS PRN
Status: DISCONTINUED | OUTPATIENT
Start: 2018-02-05 | End: 2018-02-05 | Stop reason: SDUPTHER

## 2018-02-05 RX ORDER — ONDANSETRON 2 MG/ML
4 INJECTION INTRAMUSCULAR; INTRAVENOUS EVERY 4 HOURS PRN
Status: DISCONTINUED | OUTPATIENT
Start: 2018-02-05 | End: 2018-02-05 | Stop reason: HOSPADM

## 2018-02-05 RX ORDER — FENTANYL CITRATE 50 UG/ML
INJECTION, SOLUTION INTRAMUSCULAR; INTRAVENOUS PRN
Status: DISCONTINUED | OUTPATIENT
Start: 2018-02-05 | End: 2018-02-05 | Stop reason: SDUPTHER

## 2018-02-05 RX ORDER — FENTANYL CITRATE 50 UG/ML
50 INJECTION, SOLUTION INTRAMUSCULAR; INTRAVENOUS EVERY 5 MIN PRN
Status: COMPLETED | OUTPATIENT
Start: 2018-02-05 | End: 2018-02-05

## 2018-02-05 RX ORDER — SODIUM CHLORIDE 0.9 % (FLUSH) 0.9 %
10 SYRINGE (ML) INJECTION PRN
Status: DISCONTINUED | OUTPATIENT
Start: 2018-02-05 | End: 2018-02-12 | Stop reason: HOSPADM

## 2018-02-05 RX ORDER — ONDANSETRON 2 MG/ML
4 INJECTION INTRAMUSCULAR; INTRAVENOUS EVERY 6 HOURS PRN
Status: DISCONTINUED | OUTPATIENT
Start: 2018-02-05 | End: 2018-02-12 | Stop reason: HOSPADM

## 2018-02-05 RX ORDER — LOSARTAN POTASSIUM 100 MG/1
100 TABLET ORAL DAILY
Status: DISCONTINUED | OUTPATIENT
Start: 2018-02-06 | End: 2018-02-07

## 2018-02-05 RX ORDER — FENTANYL CITRATE 50 UG/ML
25 INJECTION, SOLUTION INTRAMUSCULAR; INTRAVENOUS EVERY 5 MIN PRN
Status: DISCONTINUED | OUTPATIENT
Start: 2018-02-05 | End: 2018-02-05 | Stop reason: HOSPADM

## 2018-02-05 RX ORDER — AMLODIPINE BESYLATE 10 MG/1
10 TABLET ORAL DAILY
Status: DISCONTINUED | OUTPATIENT
Start: 2018-02-06 | End: 2018-02-12 | Stop reason: HOSPADM

## 2018-02-05 RX ORDER — SODIUM CHLORIDE 0.9 % (FLUSH) 0.9 %
10 SYRINGE (ML) INJECTION EVERY 12 HOURS SCHEDULED
Status: DISCONTINUED | OUTPATIENT
Start: 2018-02-05 | End: 2018-02-12 | Stop reason: HOSPADM

## 2018-02-05 RX ORDER — ROCURONIUM BROMIDE 10 MG/ML
INJECTION, SOLUTION INTRAVENOUS PRN
Status: DISCONTINUED | OUTPATIENT
Start: 2018-02-05 | End: 2018-02-05

## 2018-02-05 RX ORDER — METOCLOPRAMIDE HYDROCHLORIDE 5 MG/ML
10 INJECTION INTRAMUSCULAR; INTRAVENOUS
Status: COMPLETED | OUTPATIENT
Start: 2018-02-05 | End: 2018-02-05

## 2018-02-05 RX ORDER — HYDRALAZINE HYDROCHLORIDE 20 MG/ML
INJECTION INTRAMUSCULAR; INTRAVENOUS PRN
Status: DISCONTINUED | OUTPATIENT
Start: 2018-02-05 | End: 2018-02-05 | Stop reason: SDUPTHER

## 2018-02-05 RX ORDER — HYDROMORPHONE HCL 110MG/55ML
PATIENT CONTROLLED ANALGESIA SYRINGE INTRAVENOUS PRN
Status: DISCONTINUED | OUTPATIENT
Start: 2018-02-05 | End: 2018-02-05 | Stop reason: SDUPTHER

## 2018-02-05 RX ORDER — LIDOCAINE HYDROCHLORIDE 20 MG/ML
INJECTION, SOLUTION EPIDURAL; INFILTRATION; INTRACAUDAL; PERINEURAL PRN
Status: DISCONTINUED | OUTPATIENT
Start: 2018-02-05 | End: 2018-02-05 | Stop reason: SDUPTHER

## 2018-02-05 RX ORDER — CIPROFLOXACIN 2 MG/ML
INJECTION, SOLUTION INTRAVENOUS
Status: DISPENSED
Start: 2018-02-05 | End: 2018-02-05

## 2018-02-05 RX ORDER — DIPHENHYDRAMINE HYDROCHLORIDE 50 MG/ML
12.5 INJECTION INTRAMUSCULAR; INTRAVENOUS
Status: DISCONTINUED | OUTPATIENT
Start: 2018-02-05 | End: 2018-02-05 | Stop reason: HOSPADM

## 2018-02-05 RX ORDER — CLONIDINE HYDROCHLORIDE 0.2 MG/1
0.2 TABLET ORAL 3 TIMES DAILY
Status: DISCONTINUED | OUTPATIENT
Start: 2018-02-05 | End: 2018-02-12 | Stop reason: HOSPADM

## 2018-02-05 RX ORDER — MEPERIDINE HYDROCHLORIDE 25 MG/ML
12.5 INJECTION INTRAMUSCULAR; INTRAVENOUS; SUBCUTANEOUS EVERY 5 MIN PRN
Status: DISCONTINUED | OUTPATIENT
Start: 2018-02-05 | End: 2018-02-05 | Stop reason: HOSPADM

## 2018-02-05 RX ORDER — LABETALOL HYDROCHLORIDE 5 MG/ML
5 INJECTION, SOLUTION INTRAVENOUS EVERY 10 MIN PRN
Status: DISCONTINUED | OUTPATIENT
Start: 2018-02-05 | End: 2018-02-05 | Stop reason: HOSPADM

## 2018-02-05 RX ORDER — ALVIMOPAN 12 MG/1
12 CAPSULE ORAL ONCE
Status: COMPLETED | OUTPATIENT
Start: 2018-02-05 | End: 2018-02-05

## 2018-02-05 RX ORDER — CIPROFLOXACIN 2 MG/ML
400 INJECTION, SOLUTION INTRAVENOUS EVERY 12 HOURS
Status: DISCONTINUED | OUTPATIENT
Start: 2018-02-05 | End: 2018-02-12 | Stop reason: HOSPADM

## 2018-02-05 RX ORDER — PIPERACILLIN SODIUM, TAZOBACTAM SODIUM 3; .375 G/15ML; G/15ML
INJECTION, POWDER, LYOPHILIZED, FOR SOLUTION INTRAVENOUS PRN
Status: DISCONTINUED | OUTPATIENT
Start: 2018-02-05 | End: 2018-02-05 | Stop reason: SDUPTHER

## 2018-02-05 RX ADMIN — HYDRALAZINE HYDROCHLORIDE 10 MG: 20 INJECTION INTRAMUSCULAR; INTRAVENOUS at 10:59

## 2018-02-05 RX ADMIN — FENTANYL CITRATE 50 MCG: 50 INJECTION INTRAMUSCULAR; INTRAVENOUS at 16:14

## 2018-02-05 RX ADMIN — PIPERACILLIN SODIUM,TAZOBACTAM SODIUM 3.38 G: 3; .375 INJECTION, POWDER, FOR SOLUTION INTRAVENOUS at 13:33

## 2018-02-05 RX ADMIN — PHENYLEPHRINE HYDROCHLORIDE 100 MCG: 10 INJECTION INTRAMUSCULAR; INTRAVENOUS; SUBCUTANEOUS at 14:23

## 2018-02-05 RX ADMIN — Medication 20 MG: at 12:52

## 2018-02-05 RX ADMIN — SODIUM CHLORIDE: 9 INJECTION, SOLUTION INTRAVENOUS at 08:52

## 2018-02-05 RX ADMIN — FENTANYL CITRATE 50 MCG: 50 INJECTION INTRAMUSCULAR; INTRAVENOUS at 15:16

## 2018-02-05 RX ADMIN — CIPROFLOXACIN 400 MG: 2 INJECTION, SOLUTION INTRAVENOUS at 22:20

## 2018-02-05 RX ADMIN — PROPOFOL 200 MG: 10 INJECTION, EMULSION INTRAVENOUS at 10:23

## 2018-02-05 RX ADMIN — Medication 10 ML: at 20:17

## 2018-02-05 RX ADMIN — FENTANYL CITRATE 50 MCG: 50 INJECTION INTRAMUSCULAR; INTRAVENOUS at 14:58

## 2018-02-05 RX ADMIN — ALVIMOPAN 12 MG: 12 CAPSULE ORAL at 08:52

## 2018-02-05 RX ADMIN — FENTANYL CITRATE 50 MCG: 50 INJECTION INTRAMUSCULAR; INTRAVENOUS at 15:35

## 2018-02-05 RX ADMIN — FENTANYL CITRATE 100 MCG: 50 INJECTION INTRAMUSCULAR; INTRAVENOUS at 10:23

## 2018-02-05 RX ADMIN — FENTANYL CITRATE 50 MCG: 50 INJECTION INTRAMUSCULAR; INTRAVENOUS at 15:06

## 2018-02-05 RX ADMIN — ONDANSETRON 4 MG: 2 INJECTION INTRAMUSCULAR; INTRAVENOUS at 14:58

## 2018-02-05 RX ADMIN — SODIUM CHLORIDE: 9 INJECTION, SOLUTION INTRAVENOUS at 10:59

## 2018-02-05 RX ADMIN — FENTANYL CITRATE 50 MCG: 50 INJECTION INTRAMUSCULAR; INTRAVENOUS at 15:19

## 2018-02-05 RX ADMIN — Medication 10 MG: at 13:31

## 2018-02-05 RX ADMIN — LABETALOL HYDROCHLORIDE 5 MG: 5 INJECTION, SOLUTION INTRAVENOUS at 11:58

## 2018-02-05 RX ADMIN — PHENYLEPHRINE HYDROCHLORIDE 200 MCG: 10 INJECTION INTRAMUSCULAR; INTRAVENOUS; SUBCUTANEOUS at 15:00

## 2018-02-05 RX ADMIN — KETOROLAC TROMETHAMINE 30 MG: 30 INJECTION, SOLUTION INTRAMUSCULAR at 20:16

## 2018-02-05 RX ADMIN — SUGAMMADEX 300 MG: 100 INJECTION, SOLUTION INTRAVENOUS at 14:58

## 2018-02-05 RX ADMIN — CIPROFLOXACIN 400 MG: 2 INJECTION, SOLUTION INTRAVENOUS at 10:13

## 2018-02-05 RX ADMIN — METOCLOPRAMIDE 10 MG: 5 INJECTION, SOLUTION INTRAMUSCULAR; INTRAVENOUS at 16:02

## 2018-02-05 RX ADMIN — SODIUM CHLORIDE: 9 INJECTION, SOLUTION INTRAVENOUS at 15:20

## 2018-02-05 RX ADMIN — FENTANYL CITRATE 50 MCG: 50 INJECTION INTRAMUSCULAR; INTRAVENOUS at 16:40

## 2018-02-05 RX ADMIN — FAMOTIDINE 20 MG: 10 INJECTION, SOLUTION INTRAVENOUS at 20:16

## 2018-02-05 RX ADMIN — Medication 10 MG: at 12:28

## 2018-02-05 RX ADMIN — DEXAMETHASONE SODIUM PHOSPHATE 8 MG: 4 INJECTION, SOLUTION INTRAMUSCULAR; INTRAVENOUS at 10:28

## 2018-02-05 RX ADMIN — HYDROMORPHONE HYDROCHLORIDE 0.5 MG: 1 INJECTION, SOLUTION INTRAMUSCULAR; INTRAVENOUS; SUBCUTANEOUS at 15:54

## 2018-02-05 RX ADMIN — HYDROMORPHONE HYDROCHLORIDE 2 MG: 2 INJECTION INTRAMUSCULAR; INTRAVENOUS; SUBCUTANEOUS at 10:29

## 2018-02-05 RX ADMIN — FENTANYL CITRATE 100 MCG: 50 INJECTION INTRAMUSCULAR; INTRAVENOUS at 13:32

## 2018-02-05 RX ADMIN — Medication 10 MG: at 14:06

## 2018-02-05 RX ADMIN — PHENYLEPHRINE HYDROCHLORIDE 100 MCG: 10 INJECTION INTRAMUSCULAR; INTRAVENOUS; SUBCUTANEOUS at 14:27

## 2018-02-05 RX ADMIN — LEVOTHYROXINE SODIUM 175 MCG: 25 TABLET ORAL at 20:17

## 2018-02-05 RX ADMIN — LABETALOL HYDROCHLORIDE 5 MG: 5 INJECTION, SOLUTION INTRAVENOUS at 11:54

## 2018-02-05 RX ADMIN — SODIUM CHLORIDE: 9 INJECTION, SOLUTION INTRAVENOUS at 22:18

## 2018-02-05 RX ADMIN — HYDRALAZINE HYDROCHLORIDE 10 MG: 20 INJECTION INTRAMUSCULAR; INTRAVENOUS at 11:17

## 2018-02-05 RX ADMIN — HYDROMORPHONE HYDROCHLORIDE 1.5 MG: 1 INJECTION, SOLUTION INTRAMUSCULAR; INTRAVENOUS; SUBCUTANEOUS at 22:20

## 2018-02-05 RX ADMIN — HYDROMORPHONE HYDROCHLORIDE 0.5 MG: 1 INJECTION, SOLUTION INTRAMUSCULAR; INTRAVENOUS; SUBCUTANEOUS at 15:59

## 2018-02-05 RX ADMIN — HYDROMORPHONE HYDROCHLORIDE 0.5 MG: 1 INJECTION, SOLUTION INTRAMUSCULAR; INTRAVENOUS; SUBCUTANEOUS at 15:49

## 2018-02-05 RX ADMIN — SODIUM CHLORIDE: 9 INJECTION, SOLUTION INTRAVENOUS at 13:30

## 2018-02-05 RX ADMIN — LABETALOL HYDROCHLORIDE 10 MG: 5 INJECTION, SOLUTION INTRAVENOUS at 12:11

## 2018-02-05 RX ADMIN — HYDROMORPHONE HYDROCHLORIDE 1.5 MG: 1 INJECTION, SOLUTION INTRAMUSCULAR; INTRAVENOUS; SUBCUTANEOUS at 20:15

## 2018-02-05 RX ADMIN — SODIUM CHLORIDE: 9 INJECTION, SOLUTION INTRAVENOUS at 15:16

## 2018-02-05 RX ADMIN — FENTANYL CITRATE 50 MCG: 50 INJECTION INTRAMUSCULAR; INTRAVENOUS at 15:29

## 2018-02-05 RX ADMIN — LABETALOL HYDROCHLORIDE 10 MG: 5 INJECTION, SOLUTION INTRAVENOUS at 13:23

## 2018-02-05 RX ADMIN — PROMETHAZINE HYDROCHLORIDE 12.5 MG: 25 INJECTION INTRAMUSCULAR; INTRAVENOUS at 15:45

## 2018-02-05 RX ADMIN — Medication 50 MG: at 11:25

## 2018-02-05 RX ADMIN — LIDOCAINE HYDROCHLORIDE 100 MG: 20 INJECTION, SOLUTION EPIDURAL; INFILTRATION; INTRACAUDAL; PERINEURAL at 10:23

## 2018-02-05 RX ADMIN — SUCCINYLCHOLINE CHLORIDE 200 MG: 20 INJECTION, SOLUTION INTRAMUSCULAR; INTRAVENOUS at 10:23

## 2018-02-05 RX ADMIN — HYDROMORPHONE HYDROCHLORIDE 0.5 MG: 1 INJECTION, SOLUTION INTRAMUSCULAR; INTRAVENOUS; SUBCUTANEOUS at 15:44

## 2018-02-05 RX ADMIN — CLONIDINE HYDROCHLORIDE 0.2 MG: 0.2 TABLET ORAL at 20:17

## 2018-02-05 RX ADMIN — MIDAZOLAM HYDROCHLORIDE 2 MG: 1 INJECTION, SOLUTION INTRAMUSCULAR; INTRAVENOUS at 10:16

## 2018-02-05 RX ADMIN — HYDROMORPHONE HYDROCHLORIDE 1 MG: 1 INJECTION, SOLUTION INTRAMUSCULAR; INTRAVENOUS; SUBCUTANEOUS at 17:54

## 2018-02-05 ASSESSMENT — PULMONARY FUNCTION TESTS
PIF_VALUE: 23
PIF_VALUE: 24
PIF_VALUE: 23
PIF_VALUE: 25
PIF_VALUE: 26
PIF_VALUE: 25
PIF_VALUE: 25
PIF_VALUE: 21
PIF_VALUE: 25
PIF_VALUE: 26
PIF_VALUE: 21
PIF_VALUE: 13
PIF_VALUE: 23
PIF_VALUE: 27
PIF_VALUE: 23
PIF_VALUE: 25
PIF_VALUE: 19
PIF_VALUE: 21
PIF_VALUE: 21
PIF_VALUE: 25
PIF_VALUE: 25
PIF_VALUE: 27
PIF_VALUE: 25
PIF_VALUE: 20
PIF_VALUE: 1
PIF_VALUE: 21
PIF_VALUE: 18
PIF_VALUE: 23
PIF_VALUE: 25
PIF_VALUE: 11
PIF_VALUE: 22
PIF_VALUE: 25
PIF_VALUE: 18
PIF_VALUE: 25
PIF_VALUE: 21
PIF_VALUE: 25
PIF_VALUE: 25
PIF_VALUE: 18
PIF_VALUE: 26
PIF_VALUE: 4
PIF_VALUE: 25
PIF_VALUE: 4
PIF_VALUE: 25
PIF_VALUE: 21
PIF_VALUE: 1
PIF_VALUE: 26
PIF_VALUE: 25
PIF_VALUE: 26
PIF_VALUE: 21
PIF_VALUE: 2
PIF_VALUE: 24
PIF_VALUE: 25
PIF_VALUE: 17
PIF_VALUE: 25
PIF_VALUE: 18
PIF_VALUE: 25
PIF_VALUE: 26
PIF_VALUE: 25
PIF_VALUE: 25
PIF_VALUE: 18
PIF_VALUE: 25
PIF_VALUE: 1
PIF_VALUE: 25
PIF_VALUE: 4
PIF_VALUE: 21
PIF_VALUE: 25
PIF_VALUE: 23
PIF_VALUE: 1
PIF_VALUE: 18
PIF_VALUE: 25
PIF_VALUE: 25
PIF_VALUE: 27
PIF_VALUE: 23
PIF_VALUE: 25
PIF_VALUE: 22
PIF_VALUE: 21
PIF_VALUE: 24
PIF_VALUE: 25
PIF_VALUE: 25
PIF_VALUE: 26
PIF_VALUE: 25
PIF_VALUE: 25
PIF_VALUE: 22
PIF_VALUE: 3
PIF_VALUE: 25
PIF_VALUE: 25
PIF_VALUE: 0
PIF_VALUE: 22
PIF_VALUE: 25
PIF_VALUE: 25
PIF_VALUE: 23
PIF_VALUE: 19
PIF_VALUE: 25
PIF_VALUE: 28
PIF_VALUE: 25
PIF_VALUE: 20
PIF_VALUE: 26
PIF_VALUE: 25
PIF_VALUE: 23
PIF_VALUE: 25
PIF_VALUE: 20
PIF_VALUE: 25
PIF_VALUE: 25
PIF_VALUE: 26
PIF_VALUE: 21
PIF_VALUE: 24
PIF_VALUE: 25
PIF_VALUE: 1
PIF_VALUE: 25
PIF_VALUE: 2
PIF_VALUE: 25
PIF_VALUE: 25
PIF_VALUE: 23
PIF_VALUE: 23
PIF_VALUE: 20
PIF_VALUE: 25
PIF_VALUE: 21
PIF_VALUE: 24
PIF_VALUE: 24
PIF_VALUE: 23
PIF_VALUE: 28
PIF_VALUE: 23
PIF_VALUE: 26
PIF_VALUE: 25
PIF_VALUE: 13
PIF_VALUE: 25
PIF_VALUE: 24
PIF_VALUE: 25
PIF_VALUE: 26
PIF_VALUE: 23
PIF_VALUE: 26
PIF_VALUE: 23
PIF_VALUE: 25
PIF_VALUE: 26
PIF_VALUE: 24
PIF_VALUE: 25
PIF_VALUE: 23
PIF_VALUE: 25
PIF_VALUE: 25
PIF_VALUE: 10
PIF_VALUE: 25
PIF_VALUE: 21
PIF_VALUE: 25
PIF_VALUE: 25
PIF_VALUE: 1
PIF_VALUE: 21
PIF_VALUE: 28
PIF_VALUE: 23
PIF_VALUE: 4
PIF_VALUE: 22
PIF_VALUE: 25
PIF_VALUE: 23
PIF_VALUE: 25
PIF_VALUE: 19
PIF_VALUE: 23
PIF_VALUE: 21
PIF_VALUE: 24
PIF_VALUE: 25
PIF_VALUE: 22
PIF_VALUE: 25
PIF_VALUE: 23
PIF_VALUE: 25
PIF_VALUE: 26
PIF_VALUE: 26
PIF_VALUE: 25
PIF_VALUE: 18
PIF_VALUE: 21
PIF_VALUE: 35
PIF_VALUE: 26
PIF_VALUE: 26
PIF_VALUE: 25
PIF_VALUE: 25
PIF_VALUE: 26
PIF_VALUE: 25
PIF_VALUE: 21
PIF_VALUE: 23
PIF_VALUE: 25
PIF_VALUE: 4
PIF_VALUE: 21
PIF_VALUE: 25
PIF_VALUE: 25
PIF_VALUE: 18
PIF_VALUE: 21
PIF_VALUE: 3
PIF_VALUE: 25
PIF_VALUE: 5
PIF_VALUE: 25
PIF_VALUE: 25
PIF_VALUE: 22
PIF_VALUE: 25
PIF_VALUE: 18
PIF_VALUE: 21
PIF_VALUE: 25
PIF_VALUE: 21
PIF_VALUE: 25
PIF_VALUE: 21
PIF_VALUE: 25
PIF_VALUE: 24
PIF_VALUE: 25
PIF_VALUE: 27
PIF_VALUE: 2
PIF_VALUE: 18
PIF_VALUE: 22
PIF_VALUE: 25
PIF_VALUE: 21
PIF_VALUE: 25
PIF_VALUE: 25
PIF_VALUE: 24
PIF_VALUE: 4
PIF_VALUE: 21
PIF_VALUE: 26
PIF_VALUE: 25
PIF_VALUE: 3
PIF_VALUE: 25
PIF_VALUE: 23
PIF_VALUE: 25
PIF_VALUE: 25
PIF_VALUE: 20
PIF_VALUE: 25
PIF_VALUE: 4
PIF_VALUE: 12
PIF_VALUE: 23
PIF_VALUE: 25
PIF_VALUE: 26
PIF_VALUE: 25
PIF_VALUE: 21
PIF_VALUE: 25
PIF_VALUE: 26
PIF_VALUE: 3

## 2018-02-05 ASSESSMENT — PAIN SCALES - WONG BAKER: WONGBAKER_NUMERICALRESPONSE: 10

## 2018-02-05 ASSESSMENT — PAIN DESCRIPTION - PAIN TYPE
TYPE: SURGICAL PAIN

## 2018-02-05 ASSESSMENT — PAIN SCALES - GENERAL
PAINLEVEL_OUTOF10: 0
PAINLEVEL_OUTOF10: 4
PAINLEVEL_OUTOF10: 5
PAINLEVEL_OUTOF10: 8
PAINLEVEL_OUTOF10: 8
PAINLEVEL_OUTOF10: 7
PAINLEVEL_OUTOF10: 8
PAINLEVEL_OUTOF10: 10
PAINLEVEL_OUTOF10: 7
PAINLEVEL_OUTOF10: 6
PAINLEVEL_OUTOF10: 10
PAINLEVEL_OUTOF10: 9
PAINLEVEL_OUTOF10: 8
PAINLEVEL_OUTOF10: 10

## 2018-02-05 ASSESSMENT — PAIN DESCRIPTION - PROGRESSION
CLINICAL_PROGRESSION: NOT CHANGED
CLINICAL_PROGRESSION: RAPIDLY IMPROVING
CLINICAL_PROGRESSION: NOT CHANGED
CLINICAL_PROGRESSION: GRADUALLY WORSENING
CLINICAL_PROGRESSION: NOT CHANGED
CLINICAL_PROGRESSION: NOT CHANGED

## 2018-02-05 ASSESSMENT — PAIN DESCRIPTION - ONSET
ONSET: ON-GOING
ONSET: AWAKENED FROM SLEEP
ONSET: ON-GOING

## 2018-02-05 ASSESSMENT — PAIN DESCRIPTION - DESCRIPTORS
DESCRIPTORS: SHARP
DESCRIPTORS: CRAMPING;DISCOMFORT;TENDER
DESCRIPTORS: SHARP
DESCRIPTORS: SHARP
DESCRIPTORS: SPASM;DISCOMFORT;CRAMPING
DESCRIPTORS: SHARP

## 2018-02-05 ASSESSMENT — PAIN DESCRIPTION - FREQUENCY
FREQUENCY: CONTINUOUS
FREQUENCY: CONTINUOUS

## 2018-02-05 ASSESSMENT — PAIN DESCRIPTION - LOCATION
LOCATION: ABDOMEN

## 2018-02-05 NOTE — ANESTHESIA PRE PROCEDURE
Department of Anesthesiology  Preprocedure Note       Name:  Phan Castillo   Age:  56 y.o.  :  1960                                          MRN:  [id number]         Date:  2018      Surgeon: Lalo Ledezma):  [address], [place]    Procedure: Procedure(s):  ABDOMINAL EXPLORATION, RELEASE OF SMALL BOWEL OBSTRUCTION    Medications prior to admission:   Prior to Admission medications    Medication Sig Start Date End Date Taking? Authorizing Provider   cloNIDine (CATAPRES) 0.2 MG tablet Take 0.2 mg by mouth 3 times daily   Yes Historical Provider, MD   levothyroxine (SYNTHROID) 175 MCG tablet Take 175 mcg by mouth Daily   Yes Historical Provider, MD   Potassium 99 MG TABS Take by mouth 3 times daily   Yes Historical Provider, MD   oxybutynin (DITROPAN) 5 MG tablet Take 1 tablet by mouth 3 times daily 17  Yes Iris Price CNP   tamsulosin [facility]) 0.4 MG capsule Take 1 capsule by mouth nightly 17 Yes Iris Price CNP   oxyCODONE-acetaminophen (PERCOCET)  MG per tablet Take 1 tablet by mouth every 6 hours as needed for Pain  Pt states he only takes one daily .    Yes Historical Provider, MD   pantoprazole (PROTONIX) 40 MG tablet Take 1 tablet by mouth every morning (before breakfast) 9/10/17  Yes Anabell Peres MD   amLODIPine (NORVASC) 10 MG tablet Take 10 mg by mouth daily   Yes Historical Provider, MD   atenolol-chlorthalidone (TENORETIC) 100-25 MG per tablet Take 1 tablet by mouth daily   Yes Historical Provider, MD   aspirin 81 MG tablet Take 81 mg by mouth daily   Yes Historical Provider, MD   losartan (COZAAR) 100 MG tablet Take 100 mg by mouth daily   Yes Historical Provider, MD   polyethylene glycol (GLYCOLAX) powder Take 17 g by mouth daily   Yes Historical Provider, MD   ibuprofen (ADVIL;MOTRIN) 800 MG tablet Take 800 mg by mouth every 8 hours as needed for Pain   Yes Historical Provider, MD   cyclobenzaprine (FLEXERIL) 10 MG tablet Take 10 mg Leukocytosis D72.829    Hepatomegaly R16.0    Ileus (HCC) K56.7    Low grade fever R50.9    Hypokalemia E87.6       Past Medical History:        Diagnosis Date    Arthritis     Back problem     Constipation     Hypertension     Sleep apnea     has CPAP    Thyroid disease        Past Surgical History:        Procedure Laterality Date    APPENDECTOMY      CARPAL TUNNEL RELEASE Bilateral     COLONOSCOPY  2017    Dr. Gian Gill      x3 surgeries with 14 repairs    KNEE SURGERY Right 1980's    cartilage       Social History:    Social History   Substance Use Topics    Smoking status: Never Smoker    Smokeless tobacco: Current User     Types: Snuff      Comment: quit pipe over 30 yrs ago    Alcohol use No      Comment: quit                                Ready to quit: Not Answered  Counseling given: Not Answered      Vital Signs (Current):   Vitals:    01/29/18 1230 02/05/18 0815   BP:  (!) 162/88   Pulse:  59   Resp:  20   Temp:  98.5 °F (36.9 °C)   TempSrc:  Temporal   SpO2:  98%   Weight: (!) 336 lb (152.4 kg) (!) 324 lb 6.4 oz (147.1 kg)   Height: 6' 1\" (1.854 m) 6' 1\" (1.854 m)                                              BP Readings from Last 3 Encounters:   02/05/18 (!) 162/88   01/25/18 138/88   01/23/18 (!) 154/96       NPO Status: Time of last liquid consumption: 0852 (sip with medication)                        Time of last solid consumption: 0700                        Date of last liquid consumption: 02/05/18                        Date of last solid food consumption: 02/02/18    BMI:   Wt Readings from Last 3 Encounters:   02/05/18 (!) 324 lb 6.4 oz (147.1 kg)   01/25/18 (!) 336 lb 9.6 oz (152.7 kg)   01/23/18 (!) 336 lb (152.4 kg)     Body mass index is 42.8 kg/m².     CBC:   Lab Results   Component Value Date    WBC 7.1 12/01/2017    RBC 5.20 12/01/2017    RBC 5.15 08/12/2011    HGB 14.5 12/01/2017    HCT 43.1 12/01/2017    MCV 82.9 12/01/2017    RDW 16.4 12/01/2017    PLT

## 2018-02-05 NOTE — H&P
[facility]  [facility]  [address]  [place] [address]  Dept: [phone]  Dept Fax: [phone]  Loc: [phone]    Visit Date: 1/23/2018    Kristan Saint is a 56 y.o. male who presents today for:  Chief Complaint   Patient presents with    Surgical Consult     [facility] 9/7/17-9/10/17- Small bowel obstruction       HPI:     HPI 30-year-old white male who does appear older than his age who I first encountered in September 2017 with a partial small bowel obstruction that was treated conservatively and eventually resolved. He also has on top of this a history of chronic constipation which is now refractory to his MiraLAX andMovantik. He basically states he's been having abdominal bloating pain nausea and vomiting consistent with his symptoms of small bowel obstruction and he said multiple these since the hospitalization 4 months ago.   He basically goes to a clear liquid diet and his symptoms do resolve he is followed by the GI service with his last colonoscopy in March 2016 however his constipation is increasing in severity along with this abdominal cramping and apparent episodes of partial small bowel obstruction he's had multiple abdominal wall hernias repaired and also is had an exploration for small bowel obstruction in the past complicating all this is a patient takes chronic narcotics for back pain he does states he's had at least 14 abdominal surgeries    Past Medical History:   Diagnosis Date    Arthritis     Back problem     Constipation     Hypertension     Sleep apnea     Thyroid disease       Past Surgical History:   Procedure Laterality Date    APPENDECTOMY      CARPAL TUNNEL RELEASE Bilateral     COLONOSCOPY  2017    Dr. Mickey Mcdonald surgeries    KNEE SURGERY       Family History   Problem Relation Age of Onset    Cancer Mother      breast    Heart Disease Father      bladder, lung    Cancer Father Levothyroxine Sodium (SYNTHROID PO) Take 175 mcg by mouth daily Dosage unknown        No current facility-administered medications for this visit. Allergies   Allergen Reactions    Shellfish-Derived Products Swelling    Pcn [Penicillins] Itching       Subjective:      Review of Systems   Constitutional: Positive for appetite change, diaphoresis and fever. Negative for activity change, chills, fatigue and unexpected weight change. HENT: Positive for sinus pressure and tinnitus. Negative for congestion, dental problem, drooling, ear discharge, ear pain, facial swelling, hearing loss, mouth sores, nosebleeds, postnasal drip, rhinorrhea, sinus pain, sneezing, sore throat, trouble swallowing and voice change. Eyes: Negative for photophobia, pain, discharge, redness, itching and visual disturbance. Respiratory: Positive for apnea. Negative for cough, choking, chest tightness, shortness of breath, wheezing and stridor. Cardiovascular: Positive for leg swelling. Negative for chest pain and palpitations. Gastrointestinal: Positive for abdominal distention, constipation and vomiting. Endocrine: Positive for polydipsia. Negative for cold intolerance, heat intolerance, polyphagia and polyuria. Genitourinary: Negative for decreased urine volume, difficulty urinating, discharge, dysuria, enuresis, flank pain, frequency, genital sores, hematuria, penile pain, penile swelling, scrotal swelling, testicular pain and urgency. Musculoskeletal: Positive for back pain and gait problem. Negative for arthralgias, joint swelling, myalgias, neck pain and neck stiffness. Skin: Negative for color change, pallor, rash and wound. Allergic/Immunologic: Positive for food allergies. Negative for environmental allergies and immunocompromised state. Neurological: Negative for dizziness, tremors, seizures, syncope, facial asymmetry, speech difficulty, weakness, light-headedness, numbness and headaches.    Hematological: and vomiting since admission. 2.  Chronic constipation that is now refractory to his oral medications we had a very long discussion with the patient regarding the fact that he may have 2 separate issues in adhesions that are likely causing the small bowel obstructions and possible motility problem with:.   We certainly discussed that if he does not move through the small bowel easily or has partial blockages and it may affect his stool the patient is had several hernia repairs as mesh in the abdominal wall this likely increases the risk of adhesions patient states he's varying, for a chronic basis and would like to consider abdominal exploration we discussed all the potential problems given the severity of the likely adhesions the risk of mesh becoming infected we discussed the fact that lysing adhesions and releasing evidence of partial small bowel obstructions may not alleviate his constipation he voices understanding and wants to proceed we did discuss the fact that he could end up with small bowel and/or colonic resection    Plan:     Abdominal exploration with lysis of adhesions release of partial small bowel obstruction possible colon resection      Electronically signed by Sabino White MD on 1/23/2018 at 4:37 PM

## 2018-02-06 LAB
ANION GAP SERPL CALCULATED.3IONS-SCNC: 20 MEQ/L (ref 8–16)
ANISOCYTOSIS: ABNORMAL
BASOPHILIA: SLIGHT
BASOPHILS # BLD: 0.1 %
BASOPHILS ABSOLUTE: 0 THOU/MM3 (ref 0–0.1)
BUN BLDV-MCNC: 22 MG/DL (ref 7–22)
CALCIUM SERPL-MCNC: 8 MG/DL (ref 8.5–10.5)
CHLORIDE BLD-SCNC: 101 MEQ/L (ref 98–111)
CO2: 24 MEQ/L (ref 23–33)
CREAT SERPL-MCNC: 2.8 MG/DL (ref 0.4–1.2)
EOSINOPHIL # BLD: 0 %
EOSINOPHILS ABSOLUTE: 0 THOU/MM3 (ref 0–0.4)
GFR SERPL CREATININE-BSD FRML MDRD: 24 ML/MIN/1.73M2
GLUCOSE BLD-MCNC: 198 MG/DL (ref 70–108)
HCT VFR BLD CALC: 40.6 % (ref 42–52)
HEMOGLOBIN: 14.2 GM/DL (ref 14–18)
LYMPHOCYTES # BLD: 5.4 %
LYMPHOCYTES ABSOLUTE: 1.1 THOU/MM3 (ref 1–4.8)
MAGNESIUM: 2.1 MG/DL (ref 1.6–2.4)
MCH RBC QN AUTO: 30.3 PG (ref 27–31)
MCHC RBC AUTO-ENTMCNC: 34.9 GM/DL (ref 33–37)
MCV RBC AUTO: 86.9 FL (ref 80–94)
MONOCYTES # BLD: 9.8 %
MONOCYTES ABSOLUTE: 2 THOU/MM3 (ref 0.4–1.3)
NUCLEATED RED BLOOD CELLS: 0 /100 WBC
PDW BLD-RTO: 14.9 % (ref 11.5–14.5)
PLATELET # BLD: 342 THOU/MM3 (ref 130–400)
PLATELET ESTIMATE: ADEQUATE
PMV BLD AUTO: 7.7 FL (ref 7.4–10.4)
POIKILOCYTES: ABNORMAL
POTASSIUM REFLEX MAGNESIUM: 3.4 MEQ/L (ref 3.5–5.2)
RBC # BLD: 4.67 MILL/MM3 (ref 4.7–6.1)
SCAN OF BLOOD SMEAR: NORMAL
SEG NEUTROPHILS: 84.7 %
SEGMENTED NEUTROPHILS ABSOLUTE COUNT: 17.3 THOU/MM3 (ref 1.8–7.7)
SODIUM BLD-SCNC: 145 MEQ/L (ref 135–145)
WBC # BLD: 20.4 THOU/MM3 (ref 4.8–10.8)

## 2018-02-06 PROCEDURE — 80048 BASIC METABOLIC PNL TOTAL CA: CPT

## 2018-02-06 PROCEDURE — 99024 POSTOP FOLLOW-UP VISIT: CPT | Performed by: SURGERY

## 2018-02-06 PROCEDURE — APPSS30 APP SPLIT SHARED TIME 16-30 MINUTES: Performed by: NURSE PRACTITIONER

## 2018-02-06 PROCEDURE — 6370000000 HC RX 637 (ALT 250 FOR IP): Performed by: NURSE PRACTITIONER

## 2018-02-06 PROCEDURE — 94761 N-INVAS EAR/PLS OXIMETRY MLT: CPT

## 2018-02-06 PROCEDURE — 99024 POSTOP FOLLOW-UP VISIT: CPT | Performed by: NURSE PRACTITIONER

## 2018-02-06 PROCEDURE — 2580000003 HC RX 258: Performed by: SURGERY

## 2018-02-06 PROCEDURE — 2500000003 HC RX 250 WO HCPCS: Performed by: SURGERY

## 2018-02-06 PROCEDURE — 85025 COMPLETE CBC W/AUTO DIFF WBC: CPT

## 2018-02-06 PROCEDURE — 2700000000 HC OXYGEN THERAPY PER DAY

## 2018-02-06 PROCEDURE — 2580000003 HC RX 258: Performed by: NURSE PRACTITIONER

## 2018-02-06 PROCEDURE — 1200000000 HC SEMI PRIVATE

## 2018-02-06 PROCEDURE — 6360000002 HC RX W HCPCS: Performed by: SURGERY

## 2018-02-06 PROCEDURE — 6370000000 HC RX 637 (ALT 250 FOR IP): Performed by: SURGERY

## 2018-02-06 PROCEDURE — S0028 INJECTION, FAMOTIDINE, 20 MG: HCPCS | Performed by: SURGERY

## 2018-02-06 PROCEDURE — 36415 COLL VENOUS BLD VENIPUNCTURE: CPT

## 2018-02-06 PROCEDURE — 83735 ASSAY OF MAGNESIUM: CPT

## 2018-02-06 RX ORDER — POTASSIUM CHLORIDE 20 MEQ/1
40 TABLET, EXTENDED RELEASE ORAL ONCE
Status: COMPLETED | OUTPATIENT
Start: 2018-02-06 | End: 2018-02-06

## 2018-02-06 RX ORDER — 0.9 % SODIUM CHLORIDE 0.9 %
500 INTRAVENOUS SOLUTION INTRAVENOUS ONCE
Status: COMPLETED | OUTPATIENT
Start: 2018-02-06 | End: 2018-02-06

## 2018-02-06 RX ADMIN — SODIUM CHLORIDE 500 ML: 9 INJECTION, SOLUTION INTRAVENOUS at 13:59

## 2018-02-06 RX ADMIN — HYDROMORPHONE HYDROCHLORIDE 1.5 MG: 1 INJECTION, SOLUTION INTRAMUSCULAR; INTRAVENOUS; SUBCUTANEOUS at 00:26

## 2018-02-06 RX ADMIN — HYDROMORPHONE HYDROCHLORIDE 1.5 MG: 1 INJECTION, SOLUTION INTRAMUSCULAR; INTRAVENOUS; SUBCUTANEOUS at 09:01

## 2018-02-06 RX ADMIN — KETOROLAC TROMETHAMINE 30 MG: 30 INJECTION, SOLUTION INTRAMUSCULAR at 08:45

## 2018-02-06 RX ADMIN — HYDROMORPHONE HYDROCHLORIDE 1.5 MG: 1 INJECTION, SOLUTION INTRAMUSCULAR; INTRAVENOUS; SUBCUTANEOUS at 13:05

## 2018-02-06 RX ADMIN — SODIUM CHLORIDE: 9 INJECTION, SOLUTION INTRAVENOUS at 07:45

## 2018-02-06 RX ADMIN — HYDROMORPHONE HYDROCHLORIDE 1.5 MG: 1 INJECTION, SOLUTION INTRAMUSCULAR; INTRAVENOUS; SUBCUTANEOUS at 19:19

## 2018-02-06 RX ADMIN — HYDROMORPHONE HYDROCHLORIDE 1.5 MG: 1 INJECTION, SOLUTION INTRAMUSCULAR; INTRAVENOUS; SUBCUTANEOUS at 02:29

## 2018-02-06 RX ADMIN — HYDROMORPHONE HYDROCHLORIDE 1.5 MG: 1 INJECTION, SOLUTION INTRAMUSCULAR; INTRAVENOUS; SUBCUTANEOUS at 21:19

## 2018-02-06 RX ADMIN — CIPROFLOXACIN 400 MG: 2 INJECTION, SOLUTION INTRAVENOUS at 21:20

## 2018-02-06 RX ADMIN — CLONIDINE HYDROCHLORIDE 0.2 MG: 0.2 TABLET ORAL at 15:44

## 2018-02-06 RX ADMIN — CHLORTHALIDONE 25 MG: 25 TABLET ORAL at 08:41

## 2018-02-06 RX ADMIN — ENOXAPARIN SODIUM 40 MG: 40 INJECTION SUBCUTANEOUS at 08:44

## 2018-02-06 RX ADMIN — CIPROFLOXACIN 400 MG: 2 INJECTION, SOLUTION INTRAVENOUS at 10:09

## 2018-02-06 RX ADMIN — HYDROMORPHONE HYDROCHLORIDE 1.5 MG: 1 INJECTION, SOLUTION INTRAMUSCULAR; INTRAVENOUS; SUBCUTANEOUS at 04:57

## 2018-02-06 RX ADMIN — CLONIDINE HYDROCHLORIDE 0.2 MG: 0.2 TABLET ORAL at 08:41

## 2018-02-06 RX ADMIN — HYDROMORPHONE HYDROCHLORIDE 1.5 MG: 1 INJECTION, SOLUTION INTRAMUSCULAR; INTRAVENOUS; SUBCUTANEOUS at 16:05

## 2018-02-06 RX ADMIN — AMLODIPINE BESYLATE 10 MG: 10 TABLET ORAL at 08:41

## 2018-02-06 RX ADMIN — FAMOTIDINE 20 MG: 10 INJECTION, SOLUTION INTRAVENOUS at 08:44

## 2018-02-06 RX ADMIN — CLONIDINE HYDROCHLORIDE 0.2 MG: 0.2 TABLET ORAL at 20:01

## 2018-02-06 RX ADMIN — LEVOTHYROXINE SODIUM 175 MCG: 25 TABLET ORAL at 06:56

## 2018-02-06 RX ADMIN — LOSARTAN POTASSIUM 100 MG: 100 TABLET, FILM COATED ORAL at 08:41

## 2018-02-06 RX ADMIN — ATENOLOL 100 MG: 100 TABLET ORAL at 08:41

## 2018-02-06 RX ADMIN — POTASSIUM CHLORIDE 40 MEQ: 1500 TABLET, EXTENDED RELEASE ORAL at 12:46

## 2018-02-06 RX ADMIN — HYDROMORPHONE HYDROCHLORIDE 1.5 MG: 1 INJECTION, SOLUTION INTRAMUSCULAR; INTRAVENOUS; SUBCUTANEOUS at 06:57

## 2018-02-06 RX ADMIN — SODIUM CHLORIDE: 9 INJECTION, SOLUTION INTRAVENOUS at 15:43

## 2018-02-06 RX ADMIN — KETOROLAC TROMETHAMINE 30 MG: 30 INJECTION, SOLUTION INTRAMUSCULAR at 02:30

## 2018-02-06 RX ADMIN — FAMOTIDINE 20 MG: 10 INJECTION, SOLUTION INTRAVENOUS at 20:01

## 2018-02-06 ASSESSMENT — PAIN SCALES - GENERAL
PAINLEVEL_OUTOF10: 5
PAINLEVEL_OUTOF10: 7
PAINLEVEL_OUTOF10: 7
PAINLEVEL_OUTOF10: 5
PAINLEVEL_OUTOF10: 7
PAINLEVEL_OUTOF10: 5
PAINLEVEL_OUTOF10: 8
PAINLEVEL_OUTOF10: 5
PAINLEVEL_OUTOF10: 4
PAINLEVEL_OUTOF10: 3
PAINLEVEL_OUTOF10: 7
PAINLEVEL_OUTOF10: 5
PAINLEVEL_OUTOF10: 7
PAINLEVEL_OUTOF10: 5
PAINLEVEL_OUTOF10: 7
PAINLEVEL_OUTOF10: 5
PAINLEVEL_OUTOF10: 7
PAINLEVEL_OUTOF10: 5
PAINLEVEL_OUTOF10: 5
PAINLEVEL_OUTOF10: 4
PAINLEVEL_OUTOF10: 8

## 2018-02-06 ASSESSMENT — PAIN DESCRIPTION - LOCATION
LOCATION: ABDOMEN

## 2018-02-06 ASSESSMENT — PAIN DESCRIPTION - FREQUENCY
FREQUENCY: CONTINUOUS
FREQUENCY: CONTINUOUS
FREQUENCY: INTERMITTENT

## 2018-02-06 ASSESSMENT — PAIN DESCRIPTION - PROGRESSION
CLINICAL_PROGRESSION: NOT CHANGED

## 2018-02-06 ASSESSMENT — PAIN DESCRIPTION - DESCRIPTORS
DESCRIPTORS: CRAMPING;CONSTANT;DISCOMFORT
DESCRIPTORS: DISCOMFORT;TENDER;THROBBING
DESCRIPTORS: ACHING;CRAMPING

## 2018-02-06 ASSESSMENT — PAIN DESCRIPTION - ONSET
ONSET: AWAKENED FROM SLEEP
ONSET: AWAKENED FROM SLEEP

## 2018-02-06 ASSESSMENT — PAIN DESCRIPTION - PAIN TYPE
TYPE: SURGICAL PAIN

## 2018-02-06 NOTE — PROGRESS NOTES
8.0*     Ionized Calcium:No results for input(s): IONCA in the last 72 hours. Magnesium:  Recent Labs      02/06/18   0537   MG  2.1     Phosphorus:No results for input(s): PHOS in the last 72 hours. BNP:No results for input(s): BNP in the last 72 hours. Glucose:No results for input(s): POCGLU in the last 72 hours. HgbA1C: No results for input(s): LABA1C in the last 72 hours. INR: No results for input(s): INR in the last 72 hours. Hepatic: No results for input(s): ALKPHOS, ALT, AST, PROT, BILITOT, BILIDIR, LABALBU in the last 72 hours. Amylase and Lipase:No results for input(s): LACTA, AMYLASE in the last 72 hours. Lactic Acid: No results for input(s): LACTA in the last 72 hours. Troponin: No results for input(s): CKTOTAL, CKMB, TROPONINT in the last 72 hours. BNP: No results for input(s): BNP in the last 72 hours. Lipids: No results for input(s): CHOL, TRIG, HDL, LDLCALC in the last 72 hours. Invalid input(s): LDL  ABGs: No results found for: PH, PCO2, PO2, HCO3, O2SAT    Radiology reports as per the Radiologist  Radiology: No results found. Physical Exam:  Vitals: /77   Pulse 81   Temp 97.8 °F (36.6 °C) (Oral)   Resp 18   Ht 6' 1\" (1.854 m)   Wt (!) 324 lb 6.4 oz (147.1 kg)   SpO2 93%   BMI 42.80 kg/m²   24 hour intake/output:    Intake/Output Summary (Last 24 hours) at 02/06/18 1355  Last data filed at 02/06/18 1309   Gross per 24 hour   Intake             2602 ml   Output             3485 ml   Net             -883 ml     Last 3 weights:   Wt Readings from Last 3 Encounters:   02/05/18 (!) 324 lb 6.4 oz (147.1 kg)   01/25/18 (!) 336 lb 9.6 oz (152.7 kg)   01/23/18 (!) 336 lb (152.4 kg)       General appearance - oriented to person, place, and time and overweight  HEENT: Normocephalic and Atraumatic  Chest - clear to auscultation, no wheezes, rales or rhonchi, symmetric air entry  Cardiovascular - normal rate and regular rhythm  Abdomen - tenderness noted as expected, very hypoactive bowel sounds, distended   Neurological - Alert and oriented and Normal speech  Integumentary - Skin color, texture, turgor normal. No Rashes or lesions  Musculoskeletal -Full ROM times 4 extremities, No clubbing or cyanosis and No peripheral edema  Surgical Incision: midline surgical  dressing intact, no visible drainage    DVT prophylaxis: [x] Lovenox                                 [] SCDs                                 [] SQ Heparin                                 [] Encourage ambulation           [] Already on Anticoagulation                 ASSESSMENT:  S/p  Abd Exploration Lysis Severe SB Adhesions  Closure Enterotomy x 2 Extended R Colon Resection  1. Leukocytosis   2. Hypokalemia  3. Elevated creatinine  4. Acute surgical pain  5. NG tube to LIWS  6. Martinez cath - strict I&O  7. Low urine output   8. Sleep apnea        has a past medical history of Arthritis; Back problem; Constipation; Hypertension; Sleep apnea; and Thyroid disease. PLAN:  1. Surgical dressing intact  2. Monitor labs, replace lytes per protocol  3. Diet NPO- may have ice chips   4. Iv hydration   5. X 1 fluid bolus  6. Strict I&O  7. DVT and GI Prophylaxis  8. Activity - ambulate, OOB to chair, C&DB,  IS  9. Analgesia and antiemetics as needed  10. Antibiotic Therapy - cipro  11. NG tube continued to LIWS  12. Home cpap when NG tube is removed       Electronically signed by Ronaldo Dumont CNP on 2/6/2018 at 1:55 PM Patient seen and examined independently by me. Above discussed and I agree with CNP. Labs, cultures, and radiographs where available were reviewed. See orders for the updated patient care plan. Ángel Jimenez MD, patient looks amazingly good given the length of the operation and the procedure performed.   Discussed the procedure with the patient patient's white count 20,000 this somewhat expected hemoglobin is stable discussed the fact patient will likely have ileus for a period of time continue NG decompression also given the stool spillage we had the time of surgery would continue the antibiotics  2/6/2018   5:13 PM

## 2018-02-07 ENCOUNTER — APPOINTMENT (OUTPATIENT)
Dept: ULTRASOUND IMAGING | Age: 58
DRG: 329 | End: 2018-02-07
Attending: SURGERY
Payer: MEDICARE

## 2018-02-07 LAB
ANION GAP SERPL CALCULATED.3IONS-SCNC: 14 MEQ/L (ref 8–16)
BACTERIA: ABNORMAL /HPF
BILIRUBIN URINE: NEGATIVE
BLOOD, URINE: ABNORMAL
BUN BLDV-MCNC: 37 MG/DL (ref 7–22)
CALCIUM SERPL-MCNC: 8.2 MG/DL (ref 8.5–10.5)
CASTS 2: ABNORMAL /LPF
CASTS UA: ABNORMAL /LPF
CHARACTER, URINE: ABNORMAL
CHLORIDE BLD-SCNC: 104 MEQ/L (ref 98–111)
CHLORIDE, URINE: 57 MEQ/L
CO2: 28 MEQ/L (ref 23–33)
COLOR: YELLOW
CREAT SERPL-MCNC: 2.9 MG/DL (ref 0.4–1.2)
CRYSTALS, UA: ABNORMAL
EPITHELIAL CELLS, UA: ABNORMAL /HPF
GFR SERPL CREATININE-BSD FRML MDRD: 23 ML/MIN/1.73M2
GLUCOSE BLD-MCNC: 115 MG/DL (ref 70–108)
GLUCOSE URINE: NEGATIVE MG/DL
HCT VFR BLD CALC: 31.4 % (ref 42–52)
HEMOGLOBIN: 10.7 GM/DL (ref 14–18)
KETONES, URINE: NEGATIVE
LEUKOCYTE ESTERASE, URINE: ABNORMAL
MCH RBC QN AUTO: 29.2 PG (ref 27–31)
MCHC RBC AUTO-ENTMCNC: 34.1 GM/DL (ref 33–37)
MCV RBC AUTO: 85.6 FL (ref 80–94)
MISCELLANEOUS 2: ABNORMAL
MUCUS: ABNORMAL
NITRITE, URINE: NEGATIVE
PDW BLD-RTO: 15.7 % (ref 11.5–14.5)
PH UA: 5
PLATELET # BLD: 252 THOU/MM3 (ref 130–400)
PMV BLD AUTO: 7.2 FL (ref 7.4–10.4)
POTASSIUM SERPL-SCNC: 3.5 MEQ/L (ref 3.5–5.2)
PROTEIN UA: 100
RBC # BLD: 3.67 MILL/MM3 (ref 4.7–6.1)
RBC URINE: ABNORMAL /HPF
RENAL EPITHELIAL, UA: ABNORMAL
SODIUM BLD-SCNC: 146 MEQ/L (ref 135–145)
SODIUM URINE: 50 MEQ/L
SPECIFIC GRAVITY, URINE: 1.02 (ref 1–1.03)
UROBILINOGEN, URINE: 0.2 EU/DL
WBC # BLD: 11.9 THOU/MM3 (ref 4.8–10.8)
WBC UA: ABNORMAL /HPF
YEAST: ABNORMAL

## 2018-02-07 PROCEDURE — 6360000002 HC RX W HCPCS: Performed by: SURGERY

## 2018-02-07 PROCEDURE — 6370000000 HC RX 637 (ALT 250 FOR IP): Performed by: SURGERY

## 2018-02-07 PROCEDURE — 1200000000 HC SEMI PRIVATE

## 2018-02-07 PROCEDURE — 76770 US EXAM ABDO BACK WALL COMP: CPT

## 2018-02-07 PROCEDURE — 82436 ASSAY OF URINE CHLORIDE: CPT

## 2018-02-07 PROCEDURE — 36415 COLL VENOUS BLD VENIPUNCTURE: CPT

## 2018-02-07 PROCEDURE — 82638 ASSAY OF DIBUCAINE NUMBER: CPT

## 2018-02-07 PROCEDURE — 81001 URINALYSIS AUTO W/SCOPE: CPT

## 2018-02-07 PROCEDURE — 99024 POSTOP FOLLOW-UP VISIT: CPT | Performed by: NURSE PRACTITIONER

## 2018-02-07 PROCEDURE — 2580000003 HC RX 258: Performed by: SURGERY

## 2018-02-07 PROCEDURE — 84132 ASSAY OF SERUM POTASSIUM: CPT

## 2018-02-07 PROCEDURE — 2500000003 HC RX 250 WO HCPCS: Performed by: SURGERY

## 2018-02-07 PROCEDURE — 87086 URINE CULTURE/COLONY COUNT: CPT

## 2018-02-07 PROCEDURE — 6360000002 HC RX W HCPCS: Performed by: NURSE PRACTITIONER

## 2018-02-07 PROCEDURE — 99024 POSTOP FOLLOW-UP VISIT: CPT | Performed by: SURGERY

## 2018-02-07 PROCEDURE — 94761 N-INVAS EAR/PLS OXIMETRY MLT: CPT

## 2018-02-07 PROCEDURE — 99222 1ST HOSP IP/OBS MODERATE 55: CPT | Performed by: INTERNAL MEDICINE

## 2018-02-07 PROCEDURE — 82480 ASSAY SERUM CHOLINESTERASE: CPT

## 2018-02-07 PROCEDURE — A6252 ABSORPT DRG >16 <=48 W/O BDR: HCPCS

## 2018-02-07 PROCEDURE — S0028 INJECTION, FAMOTIDINE, 20 MG: HCPCS | Performed by: SURGERY

## 2018-02-07 PROCEDURE — 84300 ASSAY OF URINE SODIUM: CPT

## 2018-02-07 PROCEDURE — 2580000003 HC RX 258: Performed by: INTERNAL MEDICINE

## 2018-02-07 RX ORDER — POTASSIUM CHLORIDE 20 MEQ/1
40 TABLET, EXTENDED RELEASE ORAL ONCE
Status: DISCONTINUED | OUTPATIENT
Start: 2018-02-07 | End: 2018-02-07

## 2018-02-07 RX ORDER — POTASSIUM CHLORIDE 7.45 MG/ML
10 INJECTION INTRAVENOUS
Status: COMPLETED | OUTPATIENT
Start: 2018-02-07 | End: 2018-02-07

## 2018-02-07 RX ORDER — SODIUM CHLORIDE 450 MG/100ML
INJECTION, SOLUTION INTRAVENOUS CONTINUOUS
Status: DISCONTINUED | OUTPATIENT
Start: 2018-02-07 | End: 2018-02-10

## 2018-02-07 RX ADMIN — FAMOTIDINE 20 MG: 10 INJECTION, SOLUTION INTRAVENOUS at 08:26

## 2018-02-07 RX ADMIN — HYDROMORPHONE HYDROCHLORIDE 1.5 MG: 1 INJECTION, SOLUTION INTRAMUSCULAR; INTRAVENOUS; SUBCUTANEOUS at 21:00

## 2018-02-07 RX ADMIN — CLONIDINE HYDROCHLORIDE 0.2 MG: 0.2 TABLET ORAL at 08:16

## 2018-02-07 RX ADMIN — SODIUM CHLORIDE: 9 INJECTION, SOLUTION INTRAVENOUS at 00:34

## 2018-02-07 RX ADMIN — HYDROMORPHONE HYDROCHLORIDE 1.5 MG: 1 INJECTION, SOLUTION INTRAMUSCULAR; INTRAVENOUS; SUBCUTANEOUS at 06:08

## 2018-02-07 RX ADMIN — POTASSIUM CHLORIDE 10 MEQ: 7.46 INJECTION, SOLUTION INTRAVENOUS at 18:37

## 2018-02-07 RX ADMIN — HYDROMORPHONE HYDROCHLORIDE 1.5 MG: 1 INJECTION, SOLUTION INTRAMUSCULAR; INTRAVENOUS; SUBCUTANEOUS at 01:20

## 2018-02-07 RX ADMIN — POTASSIUM CHLORIDE 10 MEQ: 7.46 INJECTION, SOLUTION INTRAVENOUS at 21:40

## 2018-02-07 RX ADMIN — CLONIDINE HYDROCHLORIDE 0.2 MG: 0.2 TABLET ORAL at 12:46

## 2018-02-07 RX ADMIN — POTASSIUM CHLORIDE 10 MEQ: 7.46 INJECTION, SOLUTION INTRAVENOUS at 19:36

## 2018-02-07 RX ADMIN — HYDROMORPHONE HYDROCHLORIDE 1.5 MG: 1 INJECTION, SOLUTION INTRAMUSCULAR; INTRAVENOUS; SUBCUTANEOUS at 10:24

## 2018-02-07 RX ADMIN — LEVOTHYROXINE SODIUM 175 MCG: 25 TABLET ORAL at 08:19

## 2018-02-07 RX ADMIN — HYDROMORPHONE HYDROCHLORIDE 1.5 MG: 1 INJECTION, SOLUTION INTRAMUSCULAR; INTRAVENOUS; SUBCUTANEOUS at 12:47

## 2018-02-07 RX ADMIN — HYDROMORPHONE HYDROCHLORIDE 1.5 MG: 1 INJECTION, SOLUTION INTRAMUSCULAR; INTRAVENOUS; SUBCUTANEOUS at 18:47

## 2018-02-07 RX ADMIN — HYDROMORPHONE HYDROCHLORIDE 1.5 MG: 1 INJECTION, SOLUTION INTRAMUSCULAR; INTRAVENOUS; SUBCUTANEOUS at 23:18

## 2018-02-07 RX ADMIN — HYDROMORPHONE HYDROCHLORIDE 1.5 MG: 1 INJECTION, SOLUTION INTRAMUSCULAR; INTRAVENOUS; SUBCUTANEOUS at 08:20

## 2018-02-07 RX ADMIN — ENOXAPARIN SODIUM 40 MG: 40 INJECTION SUBCUTANEOUS at 08:38

## 2018-02-07 RX ADMIN — SODIUM CHLORIDE: 4.5 INJECTION, SOLUTION INTRAVENOUS at 16:57

## 2018-02-07 RX ADMIN — AMLODIPINE BESYLATE 10 MG: 10 TABLET ORAL at 08:13

## 2018-02-07 RX ADMIN — ATENOLOL 100 MG: 100 TABLET ORAL at 08:13

## 2018-02-07 RX ADMIN — CIPROFLOXACIN 400 MG: 2 INJECTION, SOLUTION INTRAVENOUS at 09:50

## 2018-02-07 RX ADMIN — CLONIDINE HYDROCHLORIDE 0.2 MG: 0.2 TABLET ORAL at 21:00

## 2018-02-07 RX ADMIN — CIPROFLOXACIN 400 MG: 2 INJECTION, SOLUTION INTRAVENOUS at 21:00

## 2018-02-07 RX ADMIN — Medication 10 ML: at 21:01

## 2018-02-07 RX ADMIN — LOSARTAN POTASSIUM 100 MG: 100 TABLET, FILM COATED ORAL at 08:17

## 2018-02-07 RX ADMIN — FAMOTIDINE 20 MG: 10 INJECTION, SOLUTION INTRAVENOUS at 21:00

## 2018-02-07 RX ADMIN — SODIUM CHLORIDE: 9 INJECTION, SOLUTION INTRAVENOUS at 09:35

## 2018-02-07 RX ADMIN — HYDROMORPHONE HYDROCHLORIDE 1.5 MG: 1 INJECTION, SOLUTION INTRAMUSCULAR; INTRAVENOUS; SUBCUTANEOUS at 15:23

## 2018-02-07 RX ADMIN — CHLORTHALIDONE 25 MG: 25 TABLET ORAL at 08:15

## 2018-02-07 RX ADMIN — POTASSIUM CHLORIDE 10 MEQ: 7.46 INJECTION, SOLUTION INTRAVENOUS at 20:42

## 2018-02-07 ASSESSMENT — PAIN SCALES - GENERAL
PAINLEVEL_OUTOF10: 8
PAINLEVEL_OUTOF10: 8
PAINLEVEL_OUTOF10: 5
PAINLEVEL_OUTOF10: 5
PAINLEVEL_OUTOF10: 7
PAINLEVEL_OUTOF10: 3
PAINLEVEL_OUTOF10: 7
PAINLEVEL_OUTOF10: 8
PAINLEVEL_OUTOF10: 7
PAINLEVEL_OUTOF10: 5
PAINLEVEL_OUTOF10: 4
PAINLEVEL_OUTOF10: 7
PAINLEVEL_OUTOF10: 7
PAINLEVEL_OUTOF10: 5

## 2018-02-07 ASSESSMENT — PAIN DESCRIPTION - ORIENTATION
ORIENTATION: MID
ORIENTATION: MID

## 2018-02-07 ASSESSMENT — PAIN DESCRIPTION - ONSET
ONSET: ON-GOING
ONSET: ON-GOING

## 2018-02-07 ASSESSMENT — PAIN DESCRIPTION - DESCRIPTORS
DESCRIPTORS: ACHING
DESCRIPTORS: ACHING

## 2018-02-07 ASSESSMENT — PAIN DESCRIPTION - LOCATION
LOCATION: ABDOMEN
LOCATION: ABDOMEN

## 2018-02-07 ASSESSMENT — PAIN DESCRIPTION - PROGRESSION
CLINICAL_PROGRESSION: NOT CHANGED
CLINICAL_PROGRESSION: NOT CHANGED

## 2018-02-07 ASSESSMENT — PAIN DESCRIPTION - PAIN TYPE
TYPE: SURGICAL PAIN
TYPE: SURGICAL PAIN

## 2018-02-07 ASSESSMENT — PAIN DESCRIPTION - FREQUENCY
FREQUENCY: CONTINUOUS
FREQUENCY: CONTINUOUS

## 2018-02-07 NOTE — PROGRESS NOTES
0730 Walked in room introduced myself to patient. Patient was awake upon entering. Patient rated pain 6/10. Patient stated feeling good over all with eception of pain related to his surgery. This SN checked vitals. Vitals were in normal limits according to patient base line. Lung sound were clear throughout lung feilds upon auscultating. Bowel sounds were not heard upon auscultation in all four quadrants. Abdomin is sensitive upon palpation. SN checked for edema on lower extremities, no edema present. SN gave patient ice chips upon request. SN gave 1.5 mg dilaudid at 0820 along with meds. NG tube clamped at 0830, SN is scheduled to open clamp at 0930. 1L O2 placed on pt per nasal canula. RT at [facility] for O2 sats 85 - 89%. O2 went up to 2L S. Iris Lo RN clinical instructor  At 6151 for O2 sats 89 - 91%. SN gave for cups of ice chips to patient from 0800 to 0900.  SN Ace.

## 2018-02-07 NOTE — PLAN OF CARE
Problem: Pain:  Goal: Pain level will decrease  Pain level will decrease   Outcome: Met This Shift  Pain is getting better as pt is taking less and getting out of bed to the chair. Problem: SAFETY  Goal: Free from accidental physical injury  Outcome: Met This Shift  Patient free from injury/harm this shift. Complying well with medical devices and following safety precautions. Fall risk continues to be assessed. Fall precautions in place, 5 P's used to provide safe environment. Bed in low and locked position, call light in reach, side rails upx2-3. Needs being met. Continuing to monitor. Problem: DAILY CARE  Goal: Daily care needs are met  Outcome: Met This Shift  Patient assessed as  1 assist to accomplish ADLs. Patient voicing needs appropriately. Encouraging and promoting as much  independence as possible per patient ability and assisting with ADLS and hygiene needs as needed. Skin and mucous membranes appropriate. Continuing to assess daily care needs. Problem: SKIN INTEGRITY  Goal: Skin integrity is maintained or improved  Outcome: Met This Shift  Pt has surgical incisions with dressings that clean dry and intact no breakdpwn noted to coccyx area    Problem: KNOWLEDGE DEFICIT  Goal: Patient/S.O. demonstrates understanding of disease process, treatment plan, medications, and discharge instructions. Outcome: Met This Shift  None voiced at this time. Problem: GI  Goal: No bowel complications  Outcome: Ongoing  Pt doesn't have any bowel sounds yet since surgery  Goal: Bowel movement at least every other day  Outcome: Ongoing  No bm yet since surgery    Problem:   Goal: Adequate urinary output  Outcome: Met This Shift  Pt has has quantity sufficient this shift with over 30ml/hr    Comments: Care plan reviewed with patient. Patient does verbalize understanding of the plan of care and contribute to goal setting.

## 2018-02-07 NOTE — FLOWSHEET NOTE
02/07/18 3594   Provider Notification   Reason for Communication Evaluate   Provider Name Ann Cee   Provider Notification Nurse Practitioner   Method of Communication Secure Message   Response Waiting for response   Notification Time 8339     creatinine 2.9 today, urine output better overnight, had 300 and then 550 out

## 2018-02-07 NOTE — CONSULTS
states that he drinks approximately 2 gallons of ice water daily. He denies any smoking history, does use chewing tobacco. He does not drink alcohol in recent years and has no history of drug abuse. His wife Cruz Todd is at the bedside. He denies fever, chills, light headedness, dizziness, chest pain, shortness of breath, nausea, vomiting, diarrhea. He has pain in his abdomen at his surgical incision. Past Medical History:  Past Medical History:   Diagnosis Date    Arthritis     Back problem     Constipation     Hypertension     Sleep apnea     has CPAP    Thyroid disease        Past Surgical History:  Past Surgical History:   Procedure Laterality Date    APPENDECTOMY      CARPAL TUNNEL RELEASE Bilateral     COLONOSCOPY  2017    Dr. Reva Boone      x3 surgeries with 14 repairs    KNEE SURGERY Right 1980's    cartilage    MT EXPLORATORY OF ABDOMEN N/A 2/5/2018    ABDOMINAL EXPLORATION WITH LYSIS OF COMPLICATED ADHESIONS WITH AN EXTENDED RIGHT COLON RESECTION performed by Froylan Bryson MD at [facility]       Family History:  Family History   Problem Relation Age of Onset    Cancer Mother      breast    Heart Disease Father      bladder, lung    Cancer Father     Stroke Brother     Diabetes Paternal Grandmother     High Blood Pressure Neg Hx        Social History:  Social History     Social History    Marital status:      Spouse name: N/A    Number of children: N/A    Years of education: N/A     Occupational History    Not on file. Social History Main Topics    Smoking status: Never Smoker    Smokeless tobacco: Current User     Types: Snuff      Comment: quit pipe over 30 yrs ago    Alcohol use No      Comment: quit    Drug use: No    Sexual activity: Yes     Other Topics Concern    Not on file     Social History Narrative    No narrative on file       Home Meds:  Prior to Admission medications    Medication Sig Start Date End Date Taking?  Authorizing Provider abdomen pelvis with IV contrast 9/06/17     FINDINGS: The visualized lung bases reveal minimal compressive atelectatic changes bilaterally a probable granulomas again noted at the right lung base laterally, stable from the previous study. The heart is at the upper limits of normal in size or    slightly enlarged. There is a small hiatal hernia.       The liver is enlarged, measuring proximally 27 cm and craniocaudal dimension and there is diffuse fatty infiltration of the liver. There is no focal liver abnormality. The gallbladder, pancreas, spleen and adrenal glands are unremarkable. There is    function from the kidneys and there are bilateral renal cysts, similar to the previous study. There is no hydronephrosis or hydroureter. The abdominal aorta and IVC appear normal in course and caliber.       There are multiple dilated air and fluid-filled loops of small bowel throughout the abdomen extending into the pelvis, concerning for at least partial small bowel obstruction, the transition point undetermined but most likely along the mid small bowel as    the ileum is decompressed. Gas and fecal debris are noted within the colon.       On the images through the pelvis, there is sigmoid diverticulosis with no CT evidence of acute diverticulitis. There are scattered phleboliths. There are prostatic calcifications. The urinary bladder appears intact as visualized. The appendix is not    confidently identified but there are no findings to suggest acute appendicitis.       There are degenerative changes along the spine and hips.           Impression   1. Findings concerning for at least partial small bowel obstruction, the transition point undetermined but most likely along the mid small bowel. 2. Additional incidental findings as described. Echocardiogram 10/18/13  SUMMARY:    Left ventricle:  Size was normal.  Systolic function was normal. Ejection fraction was estimated in the range of  55 % to 65 %.   There were no regional wall motion abnormalities. 24HR INTAKE/OUTPUT:    Intake/Output Summary (Last 24 hours) at 02/07/18 1341  Last data filed at 02/07/18 1015   Gross per 24 hour   Intake             3129 ml   Output             3000 ml   Net              129 ml     I/O last 3 completed shifts: In: 2479 [P.O.:300; I.V.:3029; IV Piggyback:200]  Out: 8734 [Urine:975; Emesis/NG output:2000; NGOQJH:157]  I/O this shift: In: 500 [P.O.:500]  Out: 460 [Emesis/NG output:400; Drains:60]  Admission weight: (!) 336 lb (152.4 kg)  Wt Readings from Last 3 Encounters:   02/05/18 (!) 324 lb 6.4 oz (147.1 kg)   01/25/18 (!) 336 lb 9.6 oz (152.7 kg)   01/23/18 (!) 336 lb (152.4 kg)     Body mass index is 42.8 kg/m². Physical Examination:  VITALS:  BP (!) 181/103   Pulse 79   Temp 96.2 °F (35.7 °C) (Axillary)   Resp 18   Ht 6' 1\" (1.854 m)   Wt (!) 324 lb 6.4 oz (147.1 kg)   SpO2 96%   BMI 42.80 kg/m²   Weight:   Wt Readings from Last 3 Encounters:   02/05/18 (!) 324 lb 6.4 oz (147.1 kg)   01/25/18 (!) 336 lb 9.6 oz (152.7 kg)   01/23/18 (!) 336 lb (152.4 kg)     Constitutional: alert and cooperative with exam, appears comfortable, no distress  Head: NC/AT   Eyes: no icteric sclera, no pallor conjunctiva, no discharge seen from either eye  Oral: moist oral mucus membranes  Ears: normal external appearance of left and right ear  Nose: no active drainage  Neck: No jugular venous distention, appears symmetric, good ROM  Lungs: Air entry B/L, no crackles or rales, no use of accessory muscles, symmetrical chest wall expansion  Heart: regular rate and rhythm, S1, S2, no murmurs rubs or gallops. Extremities: No LE edema  GI: Obese, rounded, soft, non-tender, no guarding, midline abdominal incision staples intact, no drainage or bleeding noted. Bowel sounds are not heard on auscultation. : Martinez catheter in place, draining yellow urine, approx 1400 mL in bag.    Skin: no rash seen on exposed extremities  Musculo: moves all extremities  Neuro: no slurred speech, no facial drooping, symmetric strength  Psychiatric: Awake, alert, Oriented    Impression and Plan:    1. CAROLE, likely pre-renal due to reduced renal perfusion, secondary to dehydration, emesis, low oral intake, possible intraop hypotension in the setting of NSAID, ARB and diuretic.    - Hold Losartan and Chlorthalidone  - Avoid NSAID's, non-emergent IV contrast dye, fleets phospho enema, ACE, ARB and diuretics for now. - Send stat urine lytes, U/A with reflex culture  - Recent CT abd/pelvis from September revealed only renal cysts, and recent workup from Dr. Aym Stern' office without significant findings per notes. - Continue 0.9% NS at 125 mL/hr as pt is still NPO and having significant NGT output. - Improving UOP of 500 so far today and 550 overnight, but did reduce to 125 mL during the night 2/5-2/6.   - Strict I&O  - Daily standing weights.   - BMP in am.     2. HTN, currently running 120-140 on current medications. - Monitor, as we are discontinuing Losartan and chlorthalidone. 3. Recurrent SBO/constipation, POD 2 s/p abd exp with lysis of adhesions and right colon resection with enterotomy, on Cipro post op as his WBC was 20.4 on admission. 4. Hypokalemia, likely due to chlorthalidone, 3.5 today will replace with KCl 40 meq IV since bowel sounds are rare/absent and pt is NPO. - recheck in am.     5. Anemia, unclear etiology, normochromic and has been 14-15 in the past, will repeat H&H in am to verify. Discussed with Dr. Janine Archer.     All labs, radiology and medications were reviewed and discussed with the patient and spouse, as well as RN caring for the pt. Thank you for the consult. Please feel free to call me if you have any questions.      Nelly Kuo, SAHIL, CNP  2/7/2018  1:41 PM  [facility]

## 2018-02-08 LAB
ANION GAP SERPL CALCULATED.3IONS-SCNC: 14 MEQ/L (ref 8–16)
BUN BLDV-MCNC: 29 MG/DL (ref 7–22)
CALCIUM SERPL-MCNC: 8.4 MG/DL (ref 8.5–10.5)
CHLORIDE BLD-SCNC: 100 MEQ/L (ref 98–111)
CO2: 26 MEQ/L (ref 23–33)
CREAT SERPL-MCNC: 1.8 MG/DL (ref 0.4–1.2)
GFR SERPL CREATININE-BSD FRML MDRD: 39 ML/MIN/1.73M2
GLUCOSE BLD-MCNC: 103 MG/DL (ref 70–108)
HCT VFR BLD CALC: 28.5 % (ref 42–52)
HEMOGLOBIN: 10.1 GM/DL (ref 14–18)
MCH RBC QN AUTO: 29.9 PG (ref 27–31)
MCHC RBC AUTO-ENTMCNC: 35.3 GM/DL (ref 33–37)
MCV RBC AUTO: 84.6 FL (ref 80–94)
MISC. #1 REFERENCE GROUP TEST: NORMAL
PDW BLD-RTO: 15.1 % (ref 11.5–14.5)
PLATELET # BLD: 238 THOU/MM3 (ref 130–400)
PMV BLD AUTO: 7.8 FL (ref 7.4–10.4)
POTASSIUM SERPL-SCNC: 3 MEQ/L (ref 3.5–5.2)
POTASSIUM SERPL-SCNC: 3.1 MEQ/L (ref 3.5–5.2)
POTASSIUM SERPL-SCNC: 3.4 MEQ/L (ref 3.5–5.2)
RBC # BLD: 3.37 MILL/MM3 (ref 4.7–6.1)
SODIUM BLD-SCNC: 140 MEQ/L (ref 135–145)
WBC # BLD: 9 THOU/MM3 (ref 4.8–10.8)

## 2018-02-08 PROCEDURE — 1200000000 HC SEMI PRIVATE

## 2018-02-08 PROCEDURE — 2580000003 HC RX 258: Performed by: INTERNAL MEDICINE

## 2018-02-08 PROCEDURE — 36415 COLL VENOUS BLD VENIPUNCTURE: CPT

## 2018-02-08 PROCEDURE — 99232 SBSQ HOSP IP/OBS MODERATE 35: CPT | Performed by: INTERNAL MEDICINE

## 2018-02-08 PROCEDURE — A6252 ABSORPT DRG >16 <=48 W/O BDR: HCPCS

## 2018-02-08 PROCEDURE — 2500000003 HC RX 250 WO HCPCS: Performed by: SURGERY

## 2018-02-08 PROCEDURE — 2580000003 HC RX 258: Performed by: SURGERY

## 2018-02-08 PROCEDURE — APPSS30 APP SPLIT SHARED TIME 16-30 MINUTES: Performed by: NURSE PRACTITIONER

## 2018-02-08 PROCEDURE — 85027 COMPLETE CBC AUTOMATED: CPT

## 2018-02-08 PROCEDURE — 6370000000 HC RX 637 (ALT 250 FOR IP): Performed by: NURSE PRACTITIONER

## 2018-02-08 PROCEDURE — S0028 INJECTION, FAMOTIDINE, 20 MG: HCPCS | Performed by: SURGERY

## 2018-02-08 PROCEDURE — 99024 POSTOP FOLLOW-UP VISIT: CPT | Performed by: NURSE PRACTITIONER

## 2018-02-08 PROCEDURE — 6360000002 HC RX W HCPCS: Performed by: SURGERY

## 2018-02-08 PROCEDURE — 6360000002 HC RX W HCPCS: Performed by: NURSE PRACTITIONER

## 2018-02-08 PROCEDURE — 6370000000 HC RX 637 (ALT 250 FOR IP): Performed by: SURGERY

## 2018-02-08 PROCEDURE — 84132 ASSAY OF SERUM POTASSIUM: CPT

## 2018-02-08 PROCEDURE — 80048 BASIC METABOLIC PNL TOTAL CA: CPT

## 2018-02-08 PROCEDURE — 6370000000 HC RX 637 (ALT 250 FOR IP): Performed by: INTERNAL MEDICINE

## 2018-02-08 RX ORDER — POTASSIUM CHLORIDE 750 MG/1
40 TABLET, FILM COATED, EXTENDED RELEASE ORAL ONCE
Status: COMPLETED | OUTPATIENT
Start: 2018-02-08 | End: 2018-02-08

## 2018-02-08 RX ORDER — HEPARIN SODIUM 5000 [USP'U]/ML
5000 INJECTION, SOLUTION INTRAVENOUS; SUBCUTANEOUS EVERY 8 HOURS
Status: DISCONTINUED | OUTPATIENT
Start: 2018-02-08 | End: 2018-02-12 | Stop reason: HOSPADM

## 2018-02-08 RX ORDER — POTASSIUM CHLORIDE 20 MEQ/1
40 TABLET, EXTENDED RELEASE ORAL ONCE
Status: COMPLETED | OUTPATIENT
Start: 2018-02-08 | End: 2018-02-08

## 2018-02-08 RX ORDER — POTASSIUM CHLORIDE 20MEQ/15ML
60 LIQUID (ML) ORAL ONCE
Status: COMPLETED | OUTPATIENT
Start: 2018-02-08 | End: 2018-02-08

## 2018-02-08 RX ADMIN — HYDROMORPHONE HYDROCHLORIDE 1.5 MG: 1 INJECTION, SOLUTION INTRAMUSCULAR; INTRAVENOUS; SUBCUTANEOUS at 14:22

## 2018-02-08 RX ADMIN — HYDROMORPHONE HYDROCHLORIDE 1.5 MG: 1 INJECTION, SOLUTION INTRAMUSCULAR; INTRAVENOUS; SUBCUTANEOUS at 02:34

## 2018-02-08 RX ADMIN — FAMOTIDINE 20 MG: 10 INJECTION, SOLUTION INTRAVENOUS at 08:49

## 2018-02-08 RX ADMIN — ENOXAPARIN SODIUM 40 MG: 40 INJECTION SUBCUTANEOUS at 08:55

## 2018-02-08 RX ADMIN — CIPROFLOXACIN 400 MG: 2 INJECTION, SOLUTION INTRAVENOUS at 22:11

## 2018-02-08 RX ADMIN — ONDANSETRON 4 MG: 2 INJECTION INTRAMUSCULAR; INTRAVENOUS at 20:10

## 2018-02-08 RX ADMIN — CLONIDINE HYDROCHLORIDE 0.2 MG: 0.2 TABLET ORAL at 19:57

## 2018-02-08 RX ADMIN — POTASSIUM CHLORIDE 40 MEQ: 1500 TABLET, EXTENDED RELEASE ORAL at 08:36

## 2018-02-08 RX ADMIN — HYDROMORPHONE HYDROCHLORIDE 1.5 MG: 1 INJECTION, SOLUTION INTRAMUSCULAR; INTRAVENOUS; SUBCUTANEOUS at 22:11

## 2018-02-08 RX ADMIN — Medication 10 ML: at 19:57

## 2018-02-08 RX ADMIN — ATENOLOL 100 MG: 100 TABLET ORAL at 08:32

## 2018-02-08 RX ADMIN — CLONIDINE HYDROCHLORIDE 0.2 MG: 0.2 TABLET ORAL at 13:38

## 2018-02-08 RX ADMIN — HEPARIN SODIUM 5000 UNITS: 5000 INJECTION, SOLUTION INTRAVENOUS; SUBCUTANEOUS at 17:04

## 2018-02-08 RX ADMIN — CLONIDINE HYDROCHLORIDE 0.2 MG: 0.2 TABLET ORAL at 08:34

## 2018-02-08 RX ADMIN — LEVOTHYROXINE SODIUM 175 MCG: 25 TABLET ORAL at 05:23

## 2018-02-08 RX ADMIN — CIPROFLOXACIN 400 MG: 2 INJECTION, SOLUTION INTRAVENOUS at 09:47

## 2018-02-08 RX ADMIN — HYDROMORPHONE HYDROCHLORIDE 1.5 MG: 1 INJECTION, SOLUTION INTRAMUSCULAR; INTRAVENOUS; SUBCUTANEOUS at 05:22

## 2018-02-08 RX ADMIN — HYDROMORPHONE HYDROCHLORIDE 1.5 MG: 1 INJECTION, SOLUTION INTRAMUSCULAR; INTRAVENOUS; SUBCUTANEOUS at 19:57

## 2018-02-08 RX ADMIN — POTASSIUM CHLORIDE 60 MEQ: 40 SOLUTION ORAL at 03:32

## 2018-02-08 RX ADMIN — SODIUM CHLORIDE: 4.5 INJECTION, SOLUTION INTRAVENOUS at 03:16

## 2018-02-08 RX ADMIN — FAMOTIDINE 20 MG: 10 INJECTION, SOLUTION INTRAVENOUS at 19:57

## 2018-02-08 RX ADMIN — SODIUM CHLORIDE: 4.5 INJECTION, SOLUTION INTRAVENOUS at 19:57

## 2018-02-08 RX ADMIN — HYDROMORPHONE HYDROCHLORIDE 1.5 MG: 1 INJECTION, SOLUTION INTRAMUSCULAR; INTRAVENOUS; SUBCUTANEOUS at 08:44

## 2018-02-08 RX ADMIN — POTASSIUM CHLORIDE 40 MEQ: 750 TABLET, FILM COATED, EXTENDED RELEASE ORAL at 17:04

## 2018-02-08 RX ADMIN — AMLODIPINE BESYLATE 10 MG: 10 TABLET ORAL at 08:34

## 2018-02-08 RX ADMIN — SODIUM CHLORIDE: 4.5 INJECTION, SOLUTION INTRAVENOUS at 12:38

## 2018-02-08 ASSESSMENT — PAIN DESCRIPTION - ONSET
ONSET: ON-GOING

## 2018-02-08 ASSESSMENT — PAIN DESCRIPTION - FREQUENCY
FREQUENCY: CONTINUOUS
FREQUENCY: INTERMITTENT

## 2018-02-08 ASSESSMENT — PAIN DESCRIPTION - PROGRESSION
CLINICAL_PROGRESSION: NOT CHANGED
CLINICAL_PROGRESSION: GRADUALLY IMPROVING
CLINICAL_PROGRESSION: GRADUALLY IMPROVING
CLINICAL_PROGRESSION: NOT CHANGED
CLINICAL_PROGRESSION: GRADUALLY IMPROVING
CLINICAL_PROGRESSION: GRADUALLY IMPROVING

## 2018-02-08 ASSESSMENT — PAIN SCALES - GENERAL
PAINLEVEL_OUTOF10: 7
PAINLEVEL_OUTOF10: 7
PAINLEVEL_OUTOF10: 8
PAINLEVEL_OUTOF10: 6
PAINLEVEL_OUTOF10: 4
PAINLEVEL_OUTOF10: 7
PAINLEVEL_OUTOF10: 4
PAINLEVEL_OUTOF10: 6
PAINLEVEL_OUTOF10: 8
PAINLEVEL_OUTOF10: 4
PAINLEVEL_OUTOF10: 7
PAINLEVEL_OUTOF10: 8
PAINLEVEL_OUTOF10: 7

## 2018-02-08 ASSESSMENT — PAIN DESCRIPTION - LOCATION
LOCATION: ABDOMEN

## 2018-02-08 ASSESSMENT — PAIN DESCRIPTION - PAIN TYPE
TYPE: SURGICAL PAIN

## 2018-02-08 ASSESSMENT — PAIN DESCRIPTION - ORIENTATION
ORIENTATION: MID

## 2018-02-08 ASSESSMENT — PAIN DESCRIPTION - DESCRIPTORS
DESCRIPTORS: THROBBING
DESCRIPTORS: ACHING
DESCRIPTORS: ACHING;CRAMPING
DESCRIPTORS: THROBBING
DESCRIPTORS: THROBBING
DESCRIPTORS: ACHING;CRAMPING
DESCRIPTORS: ACHING

## 2018-02-08 NOTE — PROGRESS NOTES
Went to check pt's I&O's. Ended care with pt. Pt resting. Call light in reach. Signed off to [employer].

## 2018-02-08 NOTE — PROGRESS NOTES
Went to pt's room for his afternoon assessment. His pain level is 6 out of 10. T 98.0 oral /83 left upper arm. R 18. O2 Sat 94 room air. HR 76 via monitor. Pt is resting comfortably in his chair. Call light in reach.

## 2018-02-08 NOTE — PROGRESS NOTES
[facility]         Renal Inpatient Follow-Up note         2/8/2018 9:14 AM    Pt Name:   Kristan Saint  YOB: 1960  Attending: Alia Thompson MD    Chief Complaint : Kristan Saint is a 56 y.o. male being followed by nephrology for CAROLE    Interval History :   Patient seen and examined by me. No distress  Pt states he is feeling improved, and he has bowel sounds today. Denies SOB, NVD. States he is hungry. -142 today  UOP 2900 mL, 950 overnight. Reviewed labs and US results with pt.       Scheduled Medications :    potassium replacement protocol   Other RX Placeholder    amLODIPine  10 mg Oral Daily    cloNIDine  0.2 mg Oral TID    levothyroxine  175 mcg Oral Daily    sodium chloride flush  10 mL Intravenous 2 times per day    enoxaparin  40 mg Subcutaneous Q24H    famotidine (PEPCID) injection  20 mg Intravenous BID    ciprofloxacin  400 mg Intravenous Q12H    atenolol  100 mg Oral Daily      sodium chloride 125 mL/hr at 02/08/18 0316       Vitals :  /70   Pulse 72   Temp 96.2 °F (35.7 °C) (Oral)   Resp 18   Ht 6' 1\" (1.854 m)   Wt (!) 324 lb 6.4 oz (147.1 kg)   SpO2 94%   BMI 42.80 kg/m²     24HR INTAKE/OUTPUT:      Intake/Output Summary (Last 24 hours) at 02/08/18 0914  Last data filed at 02/08/18 0334   Gross per 24 hour   Intake          5951.97 ml   Output             4090 ml   Net          1861.97 ml       Last 3 Weights  Wt Readings from Last 3 Encounters:   02/05/18 (!) 324 lb 6.4 oz (147.1 kg)   01/25/18 (!) 336 lb 9.6 oz (152.7 kg)   01/23/18 (!) 336 lb (152.4 kg)           Physical Exam :  Constitutional: alert and cooperative with exam, appears comfortable, no distress  Oral: moist oral mucus membranes  Neck: No JVD  Lungs: Clear  Heart: regular rate and rhythm, S1, S2   Extremities: No LE edema   GI: Obese, rounded, soft, tender in RLQ, no guarding, midline abd incision with dressing, clean dry intact no drainage  : Martinez Sodium, Ur Latest Units: meq/l 50   Specific Gravity, Urine Latest Ref Range: 1.002 - 1.03  1.017           Renal US 2/7/18  PROCEDURE: US RENAL COMPLETE    CLINICAL INFORMATION: RENAL FAILURE, ACUTE (KIDNEY INJURY),  .    COMPARISON: No prior study. TECHNIQUE: Grayscale color and spectral duplex imaging of the kidneys. FINDINGS: Kidneys are normal in size and echogenicity. The right kidney contains a 4.3 cm sonolucent structure compatible with a benign simple cyst.  The left kidney has 2 adjacent simple cysts versus a single cyst with a septation. I believe this actually represents 2 simple cysts largest measures 5.7 cm. There is no solid mass. There is no hydronephrosis. Martinez catheter is present in the bladder. RIGHT KIDNEY - 14.3 x 6.8 x 6.6 cm  Resistive Index - 0.73  Cortical Thickness - 1.3 cm    LEFT KIDNEY - 15.4 x 7.3 x 7.6 cm  Resistive Index - 0.66  Cortical Thickness - 2.0 cm    URINARY BLADDER  Pre-Void - 66.0 mL  Post-Void - 0 mL     Impression:     Bilateral simple cysts. Otherwise unremarkable evaluation of the kidneys          Assessment    1. CAROLE, pre-renal etiology 2/2 NPO status/low oral intake, vasomotor constriction due to NSAID's, in the setting of diuretics and ARB  - Urine lytes appear likely pre-renal but unclear due to IVF running prior to collection  - Off ARB, diuretics. No NSAID's.   - Renal US, no obstruction, bilateral simple cysts. - Creatinine much improved, down to 1.8 today  - Continue IVF 0.45% NS at 100 mL/hr.   - UOP 2900 mL/24 hours, increasing every shift.   - BMP in am.     2. Hypokalemia, 3.1 today, total of KCl 100 meq given today. - recheck at 1600 today. 3. Hypernatremia, mild, resolved on 0.45% NS  - Na 140 today. 4. HTN, -140's today. - Remains off Losartan and Chlorthalidone due to CAROLE. 5. PMH SBO with JENNY and enterotomy    Renee Moraes, APRN, CNP  2/8/2018  9:14 AM  [facility].

## 2018-02-08 NOTE — PROGRESS NOTES
Old dressing removed per doctors request, small amount of serosanginous drainage noted. Shadow drainage on RESHMA dressing. Abdominal incision well approximated and intact. 52 staples are present and intact. Incision is midline, below sternum down toward pubic area. RESHMA incision red and intact. During dressing change pt expresses pain at 8 on a 0-10 scale. Pt is resting comfortably in bed wearing CPAP, blue piece was removed due to pt not wanting it attached. Call light in reach.

## 2018-02-08 NOTE — PLAN OF CARE
Problem: SAFETY  Goal: Free from accidental physical injury  Outcome: Ongoing  Patient free from falls this shift. Gait steady with 1 assist. Alert and oriented x 4, using call light appropriately. Problem: DAILY CARE  Goal: Daily care needs are met  Outcome: Ongoing  Patient assisted with daily care needs, able to assist    Problem: PAIN  Goal: Patient's pain/discomfort is manageable  Outcome: Ongoing  Patient rating abdominal surgical pain 7/10, controlled to 4/10 with dilaudid and binder. Meeting pain goal of 4/10    Problem: SKIN INTEGRITY  Goal: Skin integrity is maintained or improved  Outcome: Ongoing  Patient has midline incision-open to air except for lower portion. Dry dressing in place with small amount of old drainage. Problem: DISCHARGE BARRIERS  Goal: Patient's continuum of care needs are met  Outcome: Ongoing  Patient anticipates being discharged to home with wife, denies needs at this time. Problem: GI  Goal: No bowel complications  Outcome: Ongoing  Abdomen soft/tender, bowel sounds hypoactive. Patient is not passing gas or having bowel movements. Denies nausea or vomiting    Problem:   Goal: Adequate urinary output  Outcome: Ongoing  Patient having at least 30 ml/hr outputs. Martinez in place with no urethral drainage-laci care provided every shift. Urine is clear/jayson    Comments: Care plan reviewed with patient. Patient verbalized understanding of the plan of care and contribute to goal setting.

## 2018-02-09 LAB
ANION GAP SERPL CALCULATED.3IONS-SCNC: 14 MEQ/L (ref 8–16)
ANISOCYTOSIS: ABNORMAL
BASOPHILS # BLD: 0.4 %
BASOPHILS ABSOLUTE: 0 THOU/MM3 (ref 0–0.1)
BUN BLDV-MCNC: 18 MG/DL (ref 7–22)
CALCIUM SERPL-MCNC: 8.8 MG/DL (ref 8.5–10.5)
CHLORIDE BLD-SCNC: 97 MEQ/L (ref 98–111)
CO2: 28 MEQ/L (ref 23–33)
CREAT SERPL-MCNC: 1.4 MG/DL (ref 0.4–1.2)
EOSINOPHIL # BLD: 2.2 %
EOSINOPHILS ABSOLUTE: 0.2 THOU/MM3 (ref 0–0.4)
GFR SERPL CREATININE-BSD FRML MDRD: 53 ML/MIN/1.73M2
GLUCOSE BLD-MCNC: 118 MG/DL (ref 70–108)
HCT VFR BLD CALC: 30.8 % (ref 42–52)
HEMOGLOBIN: 10.6 GM/DL (ref 14–18)
LYMPHOCYTES # BLD: 10 %
LYMPHOCYTES ABSOLUTE: 1 THOU/MM3 (ref 1–4.8)
MCH RBC QN AUTO: 28.9 PG (ref 27–31)
MCHC RBC AUTO-ENTMCNC: 34.3 GM/DL (ref 33–37)
MCV RBC AUTO: 84.2 FL (ref 80–94)
MONOCYTES # BLD: 12.8 %
MONOCYTES ABSOLUTE: 1.3 THOU/MM3 (ref 0.4–1.3)
NUCLEATED RED BLOOD CELLS: 0 /100 WBC
PDW BLD-RTO: 14.7 % (ref 11.5–14.5)
PLATELET # BLD: 299 THOU/MM3 (ref 130–400)
PMV BLD AUTO: 7.1 FL (ref 7.4–10.4)
POTASSIUM SERPL-SCNC: 2.9 MEQ/L (ref 3.5–5.2)
POTASSIUM SERPL-SCNC: 3.3 MEQ/L (ref 3.5–5.2)
POTASSIUM SERPL-SCNC: 3.3 MEQ/L (ref 3.5–5.2)
RBC # BLD: 3.66 MILL/MM3 (ref 4.7–6.1)
SEG NEUTROPHILS: 74.6 %
SEGMENTED NEUTROPHILS ABSOLUTE COUNT: 7.5 THOU/MM3 (ref 1.8–7.7)
SODIUM BLD-SCNC: 139 MEQ/L (ref 135–145)
URINE CULTURE REFLEX: NORMAL
WBC # BLD: 10 THOU/MM3 (ref 4.8–10.8)

## 2018-02-09 PROCEDURE — 6360000002 HC RX W HCPCS: Performed by: NURSE PRACTITIONER

## 2018-02-09 PROCEDURE — 36415 COLL VENOUS BLD VENIPUNCTURE: CPT

## 2018-02-09 PROCEDURE — 99024 POSTOP FOLLOW-UP VISIT: CPT | Performed by: SURGERY

## 2018-02-09 PROCEDURE — 99232 SBSQ HOSP IP/OBS MODERATE 35: CPT | Performed by: INTERNAL MEDICINE

## 2018-02-09 PROCEDURE — 80048 BASIC METABOLIC PNL TOTAL CA: CPT

## 2018-02-09 PROCEDURE — 85025 COMPLETE CBC W/AUTO DIFF WBC: CPT

## 2018-02-09 PROCEDURE — 6370000000 HC RX 637 (ALT 250 FOR IP): Performed by: NURSE PRACTITIONER

## 2018-02-09 PROCEDURE — 6360000002 HC RX W HCPCS: Performed by: SURGERY

## 2018-02-09 PROCEDURE — 51798 US URINE CAPACITY MEASURE: CPT

## 2018-02-09 PROCEDURE — 6370000000 HC RX 637 (ALT 250 FOR IP): Performed by: SURGERY

## 2018-02-09 PROCEDURE — 2580000003 HC RX 258: Performed by: INTERNAL MEDICINE

## 2018-02-09 PROCEDURE — 1200000000 HC SEMI PRIVATE

## 2018-02-09 PROCEDURE — APPSS30 APP SPLIT SHARED TIME 16-30 MINUTES: Performed by: NURSE PRACTITIONER

## 2018-02-09 PROCEDURE — 2500000003 HC RX 250 WO HCPCS: Performed by: SURGERY

## 2018-02-09 PROCEDURE — S0028 INJECTION, FAMOTIDINE, 20 MG: HCPCS | Performed by: SURGERY

## 2018-02-09 PROCEDURE — 84132 ASSAY OF SERUM POTASSIUM: CPT

## 2018-02-09 PROCEDURE — 2580000003 HC RX 258: Performed by: SURGERY

## 2018-02-09 RX ORDER — POTASSIUM CHLORIDE 20 MEQ/1
40 TABLET, EXTENDED RELEASE ORAL ONCE
Status: COMPLETED | OUTPATIENT
Start: 2018-02-09 | End: 2018-02-09

## 2018-02-09 RX ORDER — HYDROCODONE BITARTRATE AND ACETAMINOPHEN 5; 325 MG/1; MG/1
1 TABLET ORAL EVERY 4 HOURS PRN
Status: DISCONTINUED | OUTPATIENT
Start: 2018-02-09 | End: 2018-02-12 | Stop reason: HOSPADM

## 2018-02-09 RX ORDER — POTASSIUM CHLORIDE 7.45 MG/ML
10 INJECTION INTRAVENOUS
Status: COMPLETED | OUTPATIENT
Start: 2018-02-09 | End: 2018-02-09

## 2018-02-09 RX ORDER — METOCLOPRAMIDE HYDROCHLORIDE 5 MG/ML
10 INJECTION INTRAMUSCULAR; INTRAVENOUS EVERY 8 HOURS
Status: DISCONTINUED | OUTPATIENT
Start: 2018-02-09 | End: 2018-02-12 | Stop reason: HOSPADM

## 2018-02-09 RX ADMIN — FAMOTIDINE 20 MG: 10 INJECTION, SOLUTION INTRAVENOUS at 20:13

## 2018-02-09 RX ADMIN — POTASSIUM CHLORIDE 40 MEQ: 1500 TABLET, EXTENDED RELEASE ORAL at 14:45

## 2018-02-09 RX ADMIN — Medication 10 ML: at 08:45

## 2018-02-09 RX ADMIN — POTASSIUM CHLORIDE 10 MEQ: 7.46 INJECTION, SOLUTION INTRAVENOUS at 14:45

## 2018-02-09 RX ADMIN — ATENOLOL 100 MG: 100 TABLET ORAL at 08:44

## 2018-02-09 RX ADMIN — SODIUM CHLORIDE: 4.5 INJECTION, SOLUTION INTRAVENOUS at 03:53

## 2018-02-09 RX ADMIN — CIPROFLOXACIN 400 MG: 2 INJECTION, SOLUTION INTRAVENOUS at 22:04

## 2018-02-09 RX ADMIN — AMLODIPINE BESYLATE 10 MG: 10 TABLET ORAL at 08:44

## 2018-02-09 RX ADMIN — HYDROMORPHONE HYDROCHLORIDE 1.5 MG: 1 INJECTION, SOLUTION INTRAMUSCULAR; INTRAVENOUS; SUBCUTANEOUS at 03:53

## 2018-02-09 RX ADMIN — POTASSIUM CHLORIDE 10 MEQ: 7.46 INJECTION, SOLUTION INTRAVENOUS at 11:47

## 2018-02-09 RX ADMIN — METOCLOPRAMIDE 10 MG: 5 INJECTION, SOLUTION INTRAMUSCULAR; INTRAVENOUS at 17:46

## 2018-02-09 RX ADMIN — HYDROMORPHONE HYDROCHLORIDE 1.5 MG: 1 INJECTION, SOLUTION INTRAMUSCULAR; INTRAVENOUS; SUBCUTANEOUS at 20:13

## 2018-02-09 RX ADMIN — HYDROMORPHONE HYDROCHLORIDE 1.5 MG: 1 INJECTION, SOLUTION INTRAMUSCULAR; INTRAVENOUS; SUBCUTANEOUS at 00:17

## 2018-02-09 RX ADMIN — HEPARIN SODIUM 5000 UNITS: 5000 INJECTION, SOLUTION INTRAVENOUS; SUBCUTANEOUS at 17:46

## 2018-02-09 RX ADMIN — POTASSIUM CHLORIDE 10 MEQ: 7.46 INJECTION, SOLUTION INTRAVENOUS at 17:46

## 2018-02-09 RX ADMIN — CLONIDINE HYDROCHLORIDE 0.2 MG: 0.2 TABLET ORAL at 08:44

## 2018-02-09 RX ADMIN — POTASSIUM CHLORIDE 10 MEQ: 7.46 INJECTION, SOLUTION INTRAVENOUS at 13:24

## 2018-02-09 RX ADMIN — HYDROMORPHONE HYDROCHLORIDE 1.5 MG: 1 INJECTION, SOLUTION INTRAMUSCULAR; INTRAVENOUS; SUBCUTANEOUS at 07:48

## 2018-02-09 RX ADMIN — LEVOTHYROXINE SODIUM 175 MCG: 25 TABLET ORAL at 06:27

## 2018-02-09 RX ADMIN — CLONIDINE HYDROCHLORIDE 0.2 MG: 0.2 TABLET ORAL at 20:13

## 2018-02-09 RX ADMIN — FAMOTIDINE 20 MG: 10 INJECTION, SOLUTION INTRAVENOUS at 10:14

## 2018-02-09 RX ADMIN — POTASSIUM CHLORIDE 10 MEQ: 7.46 INJECTION, SOLUTION INTRAVENOUS at 08:44

## 2018-02-09 RX ADMIN — HYDROCODONE BITARTRATE AND ACETAMINOPHEN 1 TABLET: 5; 325 TABLET ORAL at 23:04

## 2018-02-09 RX ADMIN — CLONIDINE HYDROCHLORIDE 0.2 MG: 0.2 TABLET ORAL at 14:45

## 2018-02-09 RX ADMIN — SODIUM CHLORIDE: 4.5 INJECTION, SOLUTION INTRAVENOUS at 14:45

## 2018-02-09 RX ADMIN — POTASSIUM CHLORIDE 10 MEQ: 7.46 INJECTION, SOLUTION INTRAVENOUS at 16:16

## 2018-02-09 RX ADMIN — CIPROFLOXACIN 400 MG: 2 INJECTION, SOLUTION INTRAVENOUS at 10:13

## 2018-02-09 RX ADMIN — HEPARIN SODIUM 5000 UNITS: 5000 INJECTION, SOLUTION INTRAVENOUS; SUBCUTANEOUS at 00:17

## 2018-02-09 RX ADMIN — HEPARIN SODIUM 5000 UNITS: 5000 INJECTION, SOLUTION INTRAVENOUS; SUBCUTANEOUS at 08:44

## 2018-02-09 RX ADMIN — HYDROMORPHONE HYDROCHLORIDE 1.5 MG: 1 INJECTION, SOLUTION INTRAMUSCULAR; INTRAVENOUS; SUBCUTANEOUS at 17:07

## 2018-02-09 RX ADMIN — POTASSIUM CHLORIDE 40 MEQ: 1500 TABLET, EXTENDED RELEASE ORAL at 03:53

## 2018-02-09 RX ADMIN — METOCLOPRAMIDE 10 MG: 5 INJECTION, SOLUTION INTRAMUSCULAR; INTRAVENOUS at 11:46

## 2018-02-09 ASSESSMENT — PAIN DESCRIPTION - ORIENTATION
ORIENTATION: MID
ORIENTATION: LOWER
ORIENTATION: MID

## 2018-02-09 ASSESSMENT — PAIN DESCRIPTION - ONSET
ONSET: ON-GOING
ONSET: ON-GOING

## 2018-02-09 ASSESSMENT — PAIN DESCRIPTION - DESCRIPTORS
DESCRIPTORS: ACHING;CRAMPING

## 2018-02-09 ASSESSMENT — PAIN DESCRIPTION - LOCATION
LOCATION: ABDOMEN
LOCATION: ABDOMEN
LOCATION: ABDOMEN;BACK
LOCATION: ABDOMEN;BACK
LOCATION: ABDOMEN
LOCATION: ABDOMEN;BACK

## 2018-02-09 ASSESSMENT — PAIN DESCRIPTION - FREQUENCY
FREQUENCY: CONTINUOUS
FREQUENCY: CONTINUOUS

## 2018-02-09 ASSESSMENT — PAIN DESCRIPTION - PROGRESSION
CLINICAL_PROGRESSION: GRADUALLY IMPROVING
CLINICAL_PROGRESSION: GRADUALLY IMPROVING

## 2018-02-09 ASSESSMENT — PAIN DESCRIPTION - PAIN TYPE
TYPE: SURGICAL PAIN
TYPE: SURGICAL PAIN;CHRONIC PAIN
TYPE: SURGICAL PAIN
TYPE: SURGICAL PAIN

## 2018-02-09 ASSESSMENT — PAIN SCALES - GENERAL
PAINLEVEL_OUTOF10: 7
PAINLEVEL_OUTOF10: 6
PAINLEVEL_OUTOF10: 7
PAINLEVEL_OUTOF10: 6
PAINLEVEL_OUTOF10: 6

## 2018-02-09 NOTE — PROGRESS NOTES
Extremities: No LE edema   GI: Obese, rounded, soft,non tender, no guarding, midline abd incision with dressing, clean dry intact no drainage  Bowel sounds x 4 quads  : Not examined, mcintosh removed. Skin: Warm dry intact   Musculo: moves all extremities  Neuro: no deficits apparent  Psychiatric: Awake, alert, Oriented         Last 3 CBC   Recent Labs      02/07/18   0551  02/08/18   0546   WBC  11.9*  9.0   RBC  3.67*  3.37*   HGB  10.7*  10.1*   HCT  31.4*  28.5*   PLT  252  238     Last 3 CMP  Recent Labs      02/07/18   0551   02/08/18   0546  02/08/18 2007 02/09/18   0622   NA  146*   --   140   --   139   K  3.5   < >  3.1*  3.4*  2.9*   CL  104   --   100   --   97*   CO2  28   --   26   --   28   BUN  37*   --   29*   --   18   CREATININE  2.9*   --   1.8*   --   1.4*   CALCIUM  8.2*   --   8.4*   --   8.8    < > = values in this interval not displayed. Results for Burt Dillard (MRN [id number]) as of 2/8/2018 09:16   Ref.  Range 2/7/2018 15:00   Color, UA Latest Ref Range: STRAW-YELL  YELLOW   Glucose, UA Latest Ref Range: NEGATIVE mg/dl NEGATIVE   Bilirubin, Urine Latest Ref Range: NEGATIVE  NEGATIVE   Ketones, Urine Latest Ref Range: NEGATIVE  NEGATIVE   Blood, Urine Latest Ref Range: NEGATIVE  MODERATE (A)   pH, UA Latest Ref Range: 5.0 - 9.0  5.0   Protein, UA Latest Ref Range: NEGATIVE  100 (A)   Urobilinogen, Urine Latest Ref Range: 0.0 - 1.0 eu/dl 0.2   Nitrite, Urine Latest Ref Range: NEGATIVE  NEGATIVE   Leukocyte Esterase, Urine Latest Ref Range: NEGATIVE  MODERATE (A)   Casts UA Latest Ref Range: NONE SEEN /lpf NONE SEEN   CASTS 2 Latest Ref Range: NONE SEEN /lpf NONE SEEN   Mucus, UA Latest Ref Range: NONE SEEN/  THREADS   WBC, UA Latest Ref Range: 0-4/hpf /hpf 15-25   RBC, UA Latest Ref Range: 0-2/hpf /hpf 3-5   Epi Cells Latest Ref Range: 3-5/hpf /hpf 3-5   Renal Epithelial, Urine Latest Ref Range: NONE SEEN  NONE   Bacteria, UA Latest Ref Range: FEW/NONE S /hpf FEW   Yeast, Urine Latest Ref Range: NONE SEEN  NONE SEEN   Crystals Latest Ref Range: NONE SEEN  NONE SEEN   Character, Urine Latest Ref Range: CLEAR-SL C  CLOUDY (A)   Chloride, Urine Latest Units: meq/l 57.0   Sodium, Ur Latest Units: meq/l 50   Specific Gravity, Urine Latest Ref Range: 1.002 - 1.03  1.017           Renal US 2/7/18  PROCEDURE: US RENAL COMPLETE    CLINICAL INFORMATION: RENAL FAILURE, ACUTE (KIDNEY INJURY),  .    COMPARISON: No prior study. TECHNIQUE: Grayscale color and spectral duplex imaging of the kidneys. FINDINGS: Kidneys are normal in size and echogenicity. The right kidney contains a 4.3 cm sonolucent structure compatible with a benign simple cyst.  The left kidney has 2 adjacent simple cysts versus a single cyst with a septation. I believe this actually represents 2 simple cysts largest measures 5.7 cm. There is no solid mass. There is no hydronephrosis. Martinez catheter is present in the bladder. RIGHT KIDNEY - 14.3 x 6.8 x 6.6 cm  Resistive Index - 0.73  Cortical Thickness - 1.3 cm    LEFT KIDNEY - 15.4 x 7.3 x 7.6 cm  Resistive Index - 0.66  Cortical Thickness - 2.0 cm    URINARY BLADDER  Pre-Void - 66.0 mL  Post-Void - 0 mL     Impression:     Bilateral simple cysts. Otherwise unremarkable evaluation of the kidneys          Assessment    1. CAROLE, pre-renal etiology 2/2 NPO status/low oral intake, vasomotor constriction due to NSAID's, in the setting of diuretics and ARB  - Off ARB, diuretics. No NSAID's.   - Renal US, no obstruction, bilateral simple cysts. - Creatinine continues to, down to 1.4 today  - UOP 4750 mL/24 hours, and 1200 mL so far today. - Continue IVF 0.45% NS. But reduce rate to 75 mL/hr now that pt is taking some PO and UOP is so high, as well as in light of low K levels.    - BMP in am.     2. Hypokalemia, 2.9 today, refractory to replacements, total of KCl 60 meq given scheduled to be given IV.   - This may be due to urinary potassium wasting stimulated by gastric acid

## 2018-02-09 NOTE — PLAN OF CARE
Problem: SAFETY  Goal: Free from accidental physical injury    Intervention: PROVIDE AND MAINTAIN SAFE ENVIRONMENT  No falls this shift  Call light in reach  Pt up self with steady gait        Problem: PAIN  Goal: Patient's pain/discomfort is manageable  Outcome: Ongoing  Pain is 7/10 pain goal 5/10  Pt repositioning in bed and chair and ambulating    Problem: SKIN INTEGRITY  Goal: Skin integrity is maintained or improved  Outcome: Met This Shift  Surgical sites clean dry and intact  No s/s of infection    Problem: DISCHARGE BARRIERS  Goal: Patient's continuum of care needs are met  Outcome: Met This Shift  Pt plans to return home with spouse at discharge    Problem: GI  Goal: No bowel complications  Outcome: Met This Shift  Active bowel sounds  Passing gas  Having stools    Problem:   Goal: Adequate urinary output  Outcome: Met This Shift  Martinez removed  Pt urinating quantitty suff.  400 ml out

## 2018-02-09 NOTE — PLAN OF CARE
Problem: SAFETY  Goal: Free from accidental physical injury  Outcome: Ongoing  Patient free from falls this shift. Gait steady with 1 assist. Alert and oriented x 4, using call light appropriately.      Problem: DAILY CARE  Goal: Daily care needs are met  Outcome: Ongoing  Patient assisted with daily care needs, able to assist     Problem: PAIN  Goal: Patient's pain/discomfort is manageable  Outcome: Ongoing  Patient rating abdominal surgical pain 7/10, controlled to 4/10 with dilaudid and binder. Meeting pain goal of 4/10     Problem: SKIN INTEGRITY  Goal: Skin integrity is maintained or improved  Outcome: Ongoing  Patient has midline incision with dry dressing-clean, dry and intact.       Problem: DISCHARGE BARRIERS  Goal: Patient's continuum of care needs are met  Outcome: Ongoing  Patient anticipates being discharged to home with wife, denies needs at this time.      Problem: GI  Goal: No bowel complications  Outcome: Ongoing  Abdomen soft/tender, bowel sounds hypoactive. Patient is not passing gas or having bowel movements. Denies nausea or vomiting     Problem:   Goal: Adequate urinary output  Outcome: Ongoing  Patient having at least 30 ml/hr outputs. Martinez in place with no urethral drainage-laci care provided every shift. Urine is jayson with sediment. BUN and creatinine elevated, trending down     Comments: Care plan reviewed with patient. Patient verbalized understanding of the plan of care and contribute to goal setting.

## 2018-02-10 LAB
ANION GAP SERPL CALCULATED.3IONS-SCNC: 14 MEQ/L (ref 8–16)
BUN BLDV-MCNC: 12 MG/DL (ref 7–22)
CALCIUM SERPL-MCNC: 8.9 MG/DL (ref 8.5–10.5)
CHLORIDE BLD-SCNC: 94 MEQ/L (ref 98–111)
CO2: 30 MEQ/L (ref 23–33)
CREAT SERPL-MCNC: 1.3 MG/DL (ref 0.4–1.2)
GFR SERPL CREATININE-BSD FRML MDRD: 57 ML/MIN/1.73M2
GLUCOSE BLD-MCNC: 113 MG/DL (ref 70–108)
POTASSIUM SERPL-SCNC: 2.7 MEQ/L (ref 3.5–5.2)
POTASSIUM SERPL-SCNC: 3.1 MEQ/L (ref 3.5–5.2)
SODIUM BLD-SCNC: 138 MEQ/L (ref 135–145)

## 2018-02-10 PROCEDURE — 6360000002 HC RX W HCPCS: Performed by: INTERNAL MEDICINE

## 2018-02-10 PROCEDURE — 2500000003 HC RX 250 WO HCPCS: Performed by: SURGERY

## 2018-02-10 PROCEDURE — 6360000002 HC RX W HCPCS: Performed by: NURSE PRACTITIONER

## 2018-02-10 PROCEDURE — 6370000000 HC RX 637 (ALT 250 FOR IP): Performed by: INTERNAL MEDICINE

## 2018-02-10 PROCEDURE — 84132 ASSAY OF SERUM POTASSIUM: CPT

## 2018-02-10 PROCEDURE — 80048 BASIC METABOLIC PNL TOTAL CA: CPT

## 2018-02-10 PROCEDURE — 6370000000 HC RX 637 (ALT 250 FOR IP): Performed by: SURGERY

## 2018-02-10 PROCEDURE — 99232 SBSQ HOSP IP/OBS MODERATE 35: CPT | Performed by: INTERNAL MEDICINE

## 2018-02-10 PROCEDURE — 1200000000 HC SEMI PRIVATE

## 2018-02-10 PROCEDURE — 36415 COLL VENOUS BLD VENIPUNCTURE: CPT

## 2018-02-10 PROCEDURE — 2580000003 HC RX 258: Performed by: SURGERY

## 2018-02-10 PROCEDURE — 6360000002 HC RX W HCPCS: Performed by: SURGERY

## 2018-02-10 PROCEDURE — 99024 POSTOP FOLLOW-UP VISIT: CPT | Performed by: SURGERY

## 2018-02-10 PROCEDURE — S0028 INJECTION, FAMOTIDINE, 20 MG: HCPCS | Performed by: SURGERY

## 2018-02-10 RX ORDER — POTASSIUM CHLORIDE AND SODIUM CHLORIDE 900; 300 MG/100ML; MG/100ML
INJECTION, SOLUTION INTRAVENOUS CONTINUOUS
Status: DISCONTINUED | OUTPATIENT
Start: 2018-02-10 | End: 2018-02-12 | Stop reason: HOSPADM

## 2018-02-10 RX ORDER — POTASSIUM CHLORIDE 20MEQ/15ML
40 LIQUID (ML) ORAL 2 TIMES DAILY
Status: COMPLETED | OUTPATIENT
Start: 2018-02-10 | End: 2018-02-11

## 2018-02-10 RX ORDER — POTASSIUM CHLORIDE 20MEQ/15ML
40 LIQUID (ML) ORAL DAILY
Status: DISCONTINUED | OUTPATIENT
Start: 2018-02-11 | End: 2018-02-10

## 2018-02-10 RX ORDER — POTASSIUM CHLORIDE 7.45 MG/ML
10 INJECTION INTRAVENOUS
Status: DISPENSED | OUTPATIENT
Start: 2018-02-10 | End: 2018-02-10

## 2018-02-10 RX ORDER — POTASSIUM CHLORIDE 7.45 MG/ML
10 INJECTION INTRAVENOUS
Status: COMPLETED | OUTPATIENT
Start: 2018-02-11 | End: 2018-02-11

## 2018-02-10 RX ORDER — POTASSIUM CHLORIDE 20MEQ/15ML
40 LIQUID (ML) ORAL ONCE
Status: COMPLETED | OUTPATIENT
Start: 2018-02-11 | End: 2018-02-11

## 2018-02-10 RX ADMIN — AMLODIPINE BESYLATE 10 MG: 10 TABLET ORAL at 08:30

## 2018-02-10 RX ADMIN — HYDROCODONE BITARTRATE AND ACETAMINOPHEN 1 TABLET: 5; 325 TABLET ORAL at 19:14

## 2018-02-10 RX ADMIN — HEPARIN SODIUM 5000 UNITS: 5000 INJECTION, SOLUTION INTRAVENOUS; SUBCUTANEOUS at 08:34

## 2018-02-10 RX ADMIN — CIPROFLOXACIN 400 MG: 2 INJECTION, SOLUTION INTRAVENOUS at 21:50

## 2018-02-10 RX ADMIN — METOCLOPRAMIDE 10 MG: 5 INJECTION, SOLUTION INTRAMUSCULAR; INTRAVENOUS at 18:03

## 2018-02-10 RX ADMIN — HYDROCODONE BITARTRATE AND ACETAMINOPHEN 1 TABLET: 5; 325 TABLET ORAL at 23:15

## 2018-02-10 RX ADMIN — METOCLOPRAMIDE 10 MG: 5 INJECTION, SOLUTION INTRAMUSCULAR; INTRAVENOUS at 10:20

## 2018-02-10 RX ADMIN — POTASSIUM CHLORIDE 10 MEQ: 7.46 INJECTION, SOLUTION INTRAVENOUS at 12:16

## 2018-02-10 RX ADMIN — LEVOTHYROXINE SODIUM 175 MCG: 25 TABLET ORAL at 05:47

## 2018-02-10 RX ADMIN — POTASSIUM CHLORIDE 10 MEQ: 7.46 INJECTION, SOLUTION INTRAVENOUS at 14:16

## 2018-02-10 RX ADMIN — POTASSIUM CHLORIDE AND SODIUM CHLORIDE: 900; 300 INJECTION, SOLUTION INTRAVENOUS at 21:45

## 2018-02-10 RX ADMIN — FAMOTIDINE 20 MG: 10 INJECTION, SOLUTION INTRAVENOUS at 08:32

## 2018-02-10 RX ADMIN — ATENOLOL 100 MG: 100 TABLET ORAL at 08:31

## 2018-02-10 RX ADMIN — HEPARIN SODIUM 5000 UNITS: 5000 INJECTION, SOLUTION INTRAVENOUS; SUBCUTANEOUS at 01:23

## 2018-02-10 RX ADMIN — CLONIDINE HYDROCHLORIDE 0.2 MG: 0.2 TABLET ORAL at 08:31

## 2018-02-10 RX ADMIN — HYDROCODONE BITARTRATE AND ACETAMINOPHEN 1 TABLET: 5; 325 TABLET ORAL at 13:45

## 2018-02-10 RX ADMIN — HYDROCODONE BITARTRATE AND ACETAMINOPHEN 1 TABLET: 5; 325 TABLET ORAL at 07:19

## 2018-02-10 RX ADMIN — POTASSIUM CHLORIDE 40 MEQ: 40 SOLUTION ORAL at 19:12

## 2018-02-10 RX ADMIN — HEPARIN SODIUM 5000 UNITS: 5000 INJECTION, SOLUTION INTRAVENOUS; SUBCUTANEOUS at 17:35

## 2018-02-10 RX ADMIN — POTASSIUM CHLORIDE 10 MEQ: 7.46 INJECTION, SOLUTION INTRAVENOUS at 10:18

## 2018-02-10 RX ADMIN — CLONIDINE HYDROCHLORIDE 0.2 MG: 0.2 TABLET ORAL at 13:47

## 2018-02-10 RX ADMIN — POTASSIUM CHLORIDE 10 MEQ: 7.46 INJECTION, SOLUTION INTRAVENOUS at 16:27

## 2018-02-10 RX ADMIN — CLONIDINE HYDROCHLORIDE 0.2 MG: 0.2 TABLET ORAL at 20:50

## 2018-02-10 RX ADMIN — FAMOTIDINE 20 MG: 10 INJECTION, SOLUTION INTRAVENOUS at 20:49

## 2018-02-10 RX ADMIN — HYDROCODONE BITARTRATE AND ACETAMINOPHEN 1 TABLET: 5; 325 TABLET ORAL at 02:58

## 2018-02-10 RX ADMIN — METOCLOPRAMIDE 10 MG: 5 INJECTION, SOLUTION INTRAMUSCULAR; INTRAVENOUS at 02:04

## 2018-02-10 RX ADMIN — Medication 10 ML: at 08:33

## 2018-02-10 RX ADMIN — POTASSIUM CHLORIDE 10 MEQ: 7.46 INJECTION, SOLUTION INTRAVENOUS at 18:03

## 2018-02-10 RX ADMIN — CIPROFLOXACIN 400 MG: 2 INJECTION, SOLUTION INTRAVENOUS at 08:33

## 2018-02-10 ASSESSMENT — PAIN SCALES - GENERAL
PAINLEVEL_OUTOF10: 9
PAINLEVEL_OUTOF10: 6
PAINLEVEL_OUTOF10: 7
PAINLEVEL_OUTOF10: 6
PAINLEVEL_OUTOF10: 5
PAINLEVEL_OUTOF10: 10
PAINLEVEL_OUTOF10: 6
PAINLEVEL_OUTOF10: 6
PAINLEVEL_OUTOF10: 8
PAINLEVEL_OUTOF10: 6

## 2018-02-10 ASSESSMENT — PAIN DESCRIPTION - ORIENTATION: ORIENTATION: LOWER

## 2018-02-10 ASSESSMENT — PAIN DESCRIPTION - LOCATION
LOCATION: ABDOMEN;BACK
LOCATION: BACK
LOCATION: BACK

## 2018-02-10 ASSESSMENT — PAIN DESCRIPTION - DESCRIPTORS
DESCRIPTORS: ACHING
DESCRIPTORS: ACHING
DESCRIPTORS: ACHING;CRAMPING

## 2018-02-10 ASSESSMENT — PAIN DESCRIPTION - PAIN TYPE
TYPE: CHRONIC PAIN;SURGICAL PAIN
TYPE: SURGICAL PAIN;CHRONIC PAIN
TYPE: SURGICAL PAIN;CHRONIC PAIN
TYPE: CHRONIC PAIN
TYPE: CHRONIC PAIN

## 2018-02-10 NOTE — PROGRESS NOTES
Dr Emmie Bolaños called and clarified potassium orders.  states to finish k riders and the start maintance iv bag with potassium in it and oral liquid potassium later today. Informed  that I had already received orders to resume him home doses of potassium.  states to hold off on home oral tablets and just continue with liquid that he ordered. Informed pt. Pt verbalized understanding.

## 2018-02-10 NOTE — PLAN OF CARE
Problem: SAFETY  Goal: Free from accidental physical injury  Outcome: Ongoing  No falls or injuries. Problem: DAILY CARE  Goal: Daily care needs are met  Outcome: Ongoing  Able to assist with care. Problem: PAIN  Goal: Patient's pain/discomfort is manageable  Outcome: Ongoing  Meeting pain goal , ice to back and po medication. Problem: SKIN INTEGRITY  Goal: Skin integrity is maintained or improved  Outcome: Ongoing  Skin is intact, surgical wounds are intact and healing, open to air. Problem: KNOWLEDGE DEFICIT  Goal: Patient/S.O. demonstrates understanding of disease process, treatment plan, medications, and discharge instructions. Outcome: Ongoing  Verbalizes understanding of disease process and education. Problem: DISCHARGE BARRIERS  Goal: Patient's continuum of care needs are met  Outcome: Ongoing  Will be discharged to home with wife. Comments: Pt aware of plan of care, continuing to update and work with patient to address needs and expectations.

## 2018-02-10 NOTE — PROGRESS NOTES
PLT  238  299     Last 3 CMP  Recent Labs      02/08/18   0546   02/09/18   0622  02/09/18   1301  02/09/18   2205  02/10/18   0609   NA  140   --   139   --    --   138   K  3.1*   < >  2.9*  3.3*  3.3*  2.7*   CL  100   --   97*   --    --   94*   CO2  26   --   28   --    --   30   BUN  29*   --   18   --    --   12   CREATININE  1.8*   --   1.4*   --    --   1.3*   CALCIUM  8.4*   --   8.8   --    --   8.9    < > = values in this interval not displayed. Assessment / Plan   Renal - Acute kidney injury secondary to acute tubular necrosis renal function currently stable probably at his baseline.  - However he continues to have diarrhea and having increasing GI losses  - We'll start him on some IV fluids with some potassium After the current IV potassium is finished    Electrolytes - Hypokalemia secondary to diarrhea. It is 2.7 this morning. So far has received 40 mEq of oral and will be receiving 60 mEq of IV KCl and the first bag is running at this time. We'll give another dose of 40 oral later this afternoon. We'll also start him on normal saline with 40 mEq of KCl until morning.  - We will also add a magnesium level to this morning labs. Essential Hypertension - Running well continue current medications. Status post abdominal surgery  D/W patient, nurse. meds reviewed    RENEA Root D.  [facility].

## 2018-02-11 LAB
ANION GAP SERPL CALCULATED.3IONS-SCNC: 15 MEQ/L (ref 8–16)
BUN BLDV-MCNC: 11 MG/DL (ref 7–22)
CALCIUM SERPL-MCNC: 8.4 MG/DL (ref 8.5–10.5)
CHLORIDE BLD-SCNC: 94 MEQ/L (ref 98–111)
CO2: 28 MEQ/L (ref 23–33)
CREAT SERPL-MCNC: 1.2 MG/DL (ref 0.4–1.2)
GFR SERPL CREATININE-BSD FRML MDRD: 63 ML/MIN/1.73M2
GLUCOSE BLD-MCNC: 172 MG/DL (ref 70–108)
MAGNESIUM: 1.7 MG/DL (ref 1.6–2.4)
POTASSIUM SERPL-SCNC: 3.2 MEQ/L (ref 3.5–5.2)
POTASSIUM SERPL-SCNC: 3.4 MEQ/L (ref 3.5–5.2)
SODIUM BLD-SCNC: 137 MEQ/L (ref 135–145)

## 2018-02-11 PROCEDURE — 6370000000 HC RX 637 (ALT 250 FOR IP): Performed by: SURGERY

## 2018-02-11 PROCEDURE — 99024 POSTOP FOLLOW-UP VISIT: CPT | Performed by: SURGERY

## 2018-02-11 PROCEDURE — 6360000002 HC RX W HCPCS: Performed by: INTERNAL MEDICINE

## 2018-02-11 PROCEDURE — 99232 SBSQ HOSP IP/OBS MODERATE 35: CPT | Performed by: INTERNAL MEDICINE

## 2018-02-11 PROCEDURE — 6360000002 HC RX W HCPCS: Performed by: NURSE PRACTITIONER

## 2018-02-11 PROCEDURE — 83735 ASSAY OF MAGNESIUM: CPT

## 2018-02-11 PROCEDURE — 2500000003 HC RX 250 WO HCPCS: Performed by: SURGERY

## 2018-02-11 PROCEDURE — S0028 INJECTION, FAMOTIDINE, 20 MG: HCPCS | Performed by: SURGERY

## 2018-02-11 PROCEDURE — 2580000003 HC RX 258: Performed by: SURGERY

## 2018-02-11 PROCEDURE — 80048 BASIC METABOLIC PNL TOTAL CA: CPT

## 2018-02-11 PROCEDURE — 6370000000 HC RX 637 (ALT 250 FOR IP): Performed by: INTERNAL MEDICINE

## 2018-02-11 PROCEDURE — 1200000000 HC SEMI PRIVATE

## 2018-02-11 PROCEDURE — 6360000002 HC RX W HCPCS: Performed by: SURGERY

## 2018-02-11 PROCEDURE — 36415 COLL VENOUS BLD VENIPUNCTURE: CPT

## 2018-02-11 PROCEDURE — 84132 ASSAY OF SERUM POTASSIUM: CPT

## 2018-02-11 RX ORDER — POTASSIUM CHLORIDE 20MEQ/15ML
40 LIQUID (ML) ORAL 2 TIMES DAILY
Status: DISCONTINUED | OUTPATIENT
Start: 2018-02-11 | End: 2018-02-12 | Stop reason: HOSPADM

## 2018-02-11 RX ORDER — HYDROCODONE BITARTRATE AND ACETAMINOPHEN 5; 325 MG/1; MG/1
1 TABLET ORAL EVERY 4 HOURS PRN
Qty: 42 TABLET | Refills: 0 | Status: SHIPPED | OUTPATIENT
Start: 2018-02-11 | End: 2018-02-12 | Stop reason: HOSPADM

## 2018-02-11 RX ORDER — FAMOTIDINE 20 MG/1
20 TABLET, FILM COATED ORAL 2 TIMES DAILY
Status: DISCONTINUED | OUTPATIENT
Start: 2018-02-11 | End: 2018-02-12 | Stop reason: HOSPADM

## 2018-02-11 RX ADMIN — HEPARIN SODIUM 5000 UNITS: 5000 INJECTION, SOLUTION INTRAVENOUS; SUBCUTANEOUS at 08:51

## 2018-02-11 RX ADMIN — AMLODIPINE BESYLATE 10 MG: 10 TABLET ORAL at 08:46

## 2018-02-11 RX ADMIN — CLONIDINE HYDROCHLORIDE 0.2 MG: 0.2 TABLET ORAL at 20:15

## 2018-02-11 RX ADMIN — FAMOTIDINE 20 MG: 10 INJECTION, SOLUTION INTRAVENOUS at 08:47

## 2018-02-11 RX ADMIN — CIPROFLOXACIN 400 MG: 2 INJECTION, SOLUTION INTRAVENOUS at 10:02

## 2018-02-11 RX ADMIN — METOCLOPRAMIDE 10 MG: 5 INJECTION, SOLUTION INTRAMUSCULAR; INTRAVENOUS at 01:49

## 2018-02-11 RX ADMIN — POTASSIUM CHLORIDE 10 MEQ: 7.46 INJECTION, SOLUTION INTRAVENOUS at 14:16

## 2018-02-11 RX ADMIN — CLONIDINE HYDROCHLORIDE 0.2 MG: 0.2 TABLET ORAL at 08:46

## 2018-02-11 RX ADMIN — HEPARIN SODIUM 5000 UNITS: 5000 INJECTION, SOLUTION INTRAVENOUS; SUBCUTANEOUS at 17:35

## 2018-02-11 RX ADMIN — METOCLOPRAMIDE 10 MG: 5 INJECTION, SOLUTION INTRAMUSCULAR; INTRAVENOUS at 18:27

## 2018-02-11 RX ADMIN — POTASSIUM CHLORIDE 40 MEQ: 40 SOLUTION ORAL at 08:46

## 2018-02-11 RX ADMIN — HYDROCODONE BITARTRATE AND ACETAMINOPHEN 1 TABLET: 5; 325 TABLET ORAL at 03:28

## 2018-02-11 RX ADMIN — POTASSIUM CHLORIDE 10 MEQ: 7.46 INJECTION, SOLUTION INTRAVENOUS at 04:15

## 2018-02-11 RX ADMIN — METOCLOPRAMIDE 10 MG: 5 INJECTION, SOLUTION INTRAMUSCULAR; INTRAVENOUS at 10:03

## 2018-02-11 RX ADMIN — POTASSIUM CHLORIDE 40 MEQ: 40 SOLUTION ORAL at 00:32

## 2018-02-11 RX ADMIN — ATENOLOL 100 MG: 100 TABLET ORAL at 08:46

## 2018-02-11 RX ADMIN — CLONIDINE HYDROCHLORIDE 0.2 MG: 0.2 TABLET ORAL at 14:02

## 2018-02-11 RX ADMIN — POTASSIUM CHLORIDE 10 MEQ: 7.46 INJECTION, SOLUTION INTRAVENOUS at 00:01

## 2018-02-11 RX ADMIN — POTASSIUM CHLORIDE 10 MEQ: 7.46 INJECTION, SOLUTION INTRAVENOUS at 12:55

## 2018-02-11 RX ADMIN — HEPARIN SODIUM 5000 UNITS: 5000 INJECTION, SOLUTION INTRAVENOUS; SUBCUTANEOUS at 01:48

## 2018-02-11 RX ADMIN — CIPROFLOXACIN 400 MG: 2 INJECTION, SOLUTION INTRAVENOUS at 21:39

## 2018-02-11 RX ADMIN — HYDROCODONE BITARTRATE AND ACETAMINOPHEN 1 TABLET: 5; 325 TABLET ORAL at 08:19

## 2018-02-11 RX ADMIN — POTASSIUM CHLORIDE 10 MEQ: 7.46 INJECTION, SOLUTION INTRAVENOUS at 10:15

## 2018-02-11 RX ADMIN — HYDROCODONE BITARTRATE AND ACETAMINOPHEN 1 TABLET: 5; 325 TABLET ORAL at 16:09

## 2018-02-11 RX ADMIN — HYDROCODONE BITARTRATE AND ACETAMINOPHEN 1 TABLET: 5; 325 TABLET ORAL at 20:13

## 2018-02-11 RX ADMIN — LEVOTHYROXINE SODIUM 175 MCG: 25 TABLET ORAL at 07:10

## 2018-02-11 RX ADMIN — FAMOTIDINE 20 MG: 20 TABLET, FILM COATED ORAL at 21:39

## 2018-02-11 RX ADMIN — Medication 10 ML: at 08:50

## 2018-02-11 RX ADMIN — POTASSIUM CHLORIDE 40 MEQ: 40 SOLUTION ORAL at 20:18

## 2018-02-11 RX ADMIN — POTASSIUM CHLORIDE 10 MEQ: 7.46 INJECTION, SOLUTION INTRAVENOUS at 07:11

## 2018-02-11 ASSESSMENT — PAIN DESCRIPTION - DESCRIPTORS
DESCRIPTORS: CRAMPING
DESCRIPTORS: SORE;CRAMPING
DESCRIPTORS: CRAMPING
DESCRIPTORS: SORE
DESCRIPTORS: SORE
DESCRIPTORS: SORE;CRAMPING
DESCRIPTORS: CRAMPING
DESCRIPTORS: CRAMPING

## 2018-02-11 ASSESSMENT — PAIN SCALES - GENERAL
PAINLEVEL_OUTOF10: 10
PAINLEVEL_OUTOF10: 7
PAINLEVEL_OUTOF10: 8
PAINLEVEL_OUTOF10: 7
PAINLEVEL_OUTOF10: 6
PAINLEVEL_OUTOF10: 10
PAINLEVEL_OUTOF10: 9
PAINLEVEL_OUTOF10: 8
PAINLEVEL_OUTOF10: 7
PAINLEVEL_OUTOF10: 6
PAINLEVEL_OUTOF10: 9
PAINLEVEL_OUTOF10: 7

## 2018-02-11 ASSESSMENT — PAIN DESCRIPTION - PAIN TYPE
TYPE: SURGICAL PAIN
TYPE: ACUTE PAIN
TYPE: SURGICAL PAIN

## 2018-02-11 ASSESSMENT — PAIN DESCRIPTION - LOCATION
LOCATION: ABDOMEN

## 2018-02-11 ASSESSMENT — PAIN DESCRIPTION - ORIENTATION: ORIENTATION: LOWER

## 2018-02-11 NOTE — PROGRESS NOTES
Dr Emmie Bolaños paged through prefect serve to see when he want potassium recheck. Dr Emmie Bolaños up to unit and states. To give oral potassium now and finish current iv krider. And then recheck potassium level and then call with results to dr Emmie Bolaños before hanging maintance iv potassium bag.

## 2018-02-11 NOTE — PLAN OF CARE
Problem: SAFETY  Goal: Free from accidental physical injury  Outcome: Ongoing  No falls or injuries, up ad kenneth, steady on feet    Problem: DAILY CARE  Goal: Daily care needs are met  Outcome: Ongoing  Minimal assistance    Problem: PAIN  Goal: Patient's pain/discomfort is manageable  Outcome: Ongoing  Pain controlled with PO narcotics, goal of 4 met at times    Problem: SKIN INTEGRITY  Goal: Skin integrity is maintained or improved  Outcome: Ongoing  Abdominal incision and staples intact    Problem: KNOWLEDGE DEFICIT  Goal: Patient/S.O. demonstrates understanding of disease process, treatment plan, medications, and discharge instructions. Outcome: Ongoing  Understands ongoing teaching    Problem: DISCHARGE BARRIERS  Goal: Patient's continuum of care needs are met  Outcome: Ongoing  Home when discharged    Comments: Care plan reviewed with patient. Patient  verbalizes understanding of the plan of care and contribute to goal setting.

## 2018-02-11 NOTE — PROGRESS NOTES
[facility]    Renal inpatient Progress Note  2/11/2018 3:41 PM      Pt Name:   Nohemi Edwards:   1960  Attending: Rica Freedman MD    Chief Complaint:   Abner Field is a 56 y.o. male being followed by nephrology for Acute kidney injury and electrolytes abnormalities. Interval History : patient seen and examined by me. Feels okay and no chest pain no shortness of breath. No significant diarrhea . Scheduled Medications :   metoclopramide  10 mg Intravenous Q8H    heparin (porcine)  5,000 Units Subcutaneous Q8H    potassium replacement protocol   Other RX Placeholder    amLODIPine  10 mg Oral Daily    cloNIDine  0.2 mg Oral TID    levothyroxine  175 mcg Oral Daily    sodium chloride flush  10 mL Intravenous 2 times per day    famotidine (PEPCID) injection  20 mg Intravenous BID    ciprofloxacin  400 mg Intravenous Q12H    atenolol  100 mg Oral Daily      0.9% NaCl with KCl 40 mEq 60 mL/hr at 02/10/18 2145        Vitals :  BP (!) 140/81   Pulse 75   Temp 99.3 °F (37.4 °C) (Oral)   Resp 18   Ht 6' 1\" (1.854 m)   Wt (!) 314 lb 6.4 oz (142.6 kg)   SpO2 95%   BMI 41.48 kg/m²     24HR INTAKE/OUTPUT:      Intake/Output Summary (Last 24 hours) at 02/11/18 1541  Last data filed at 02/11/18 0813   Gross per 24 hour   Intake             1092 ml   Output             1850 ml   Net             -758 ml     Last 3 weights  Wt Readings from Last 3 Encounters:   02/10/18 (!) 314 lb 6.4 oz (142.6 kg)   01/25/18 (!) 336 lb 9.6 oz (152.7 kg)   01/23/18 (!) 336 lb (152.4 kg)        Physical Exam :  General Appearance:  Well developed.  No distress  Mouth/Throat:  Oral mucosa moist  Neck:  Supple, no JVD  Lungs:  Breath sounds: clear  Heart[de-identified]  S1,S2 heard  Abdomen:  Soft, non - tender  Musculoskeletal:  Edema - No edema noted     Last 3 CBC  Recent Labs      02/09/18   2205   WBC  10.0   RBC  3.66*   HGB  10.6*   HCT  30.8*   PLT  299     Last 3 CMP  Recent Labs

## 2018-02-12 VITALS
SYSTOLIC BLOOD PRESSURE: 131 MMHG | WEIGHT: 315 LBS | HEIGHT: 73 IN | OXYGEN SATURATION: 97 % | RESPIRATION RATE: 14 BRPM | HEART RATE: 80 BPM | TEMPERATURE: 98 F | BODY MASS INDEX: 41.75 KG/M2 | DIASTOLIC BLOOD PRESSURE: 74 MMHG

## 2018-02-12 PROBLEM — K56.609 SMALL BOWEL OBSTRUCTION (HCC): Status: RESOLVED | Noted: 2017-09-06 | Resolved: 2018-02-12

## 2018-02-12 LAB
ANION GAP SERPL CALCULATED.3IONS-SCNC: 15 MEQ/L (ref 8–16)
BUN BLDV-MCNC: 11 MG/DL (ref 7–22)
CALCIUM SERPL-MCNC: 8.3 MG/DL (ref 8.5–10.5)
CHLORIDE BLD-SCNC: 94 MEQ/L (ref 98–111)
CO2: 26 MEQ/L (ref 23–33)
CREAT SERPL-MCNC: 1.2 MG/DL (ref 0.4–1.2)
GFR SERPL CREATININE-BSD FRML MDRD: 63 ML/MIN/1.73M2
GLUCOSE BLD-MCNC: 169 MG/DL (ref 70–108)
POTASSIUM SERPL-SCNC: 3.4 MEQ/L (ref 3.5–5.2)
SODIUM BLD-SCNC: 135 MEQ/L (ref 135–145)

## 2018-02-12 PROCEDURE — 6370000000 HC RX 637 (ALT 250 FOR IP): Performed by: INTERNAL MEDICINE

## 2018-02-12 PROCEDURE — 36415 COLL VENOUS BLD VENIPUNCTURE: CPT

## 2018-02-12 PROCEDURE — 6360000002 HC RX W HCPCS: Performed by: NURSE PRACTITIONER

## 2018-02-12 PROCEDURE — 80048 BASIC METABOLIC PNL TOTAL CA: CPT

## 2018-02-12 PROCEDURE — 6370000000 HC RX 637 (ALT 250 FOR IP): Performed by: NURSE PRACTITIONER

## 2018-02-12 PROCEDURE — 6370000000 HC RX 637 (ALT 250 FOR IP): Performed by: SURGERY

## 2018-02-12 PROCEDURE — 99232 SBSQ HOSP IP/OBS MODERATE 35: CPT | Performed by: NURSE PRACTITIONER

## 2018-02-12 RX ORDER — OXYCODONE AND ACETAMINOPHEN 10; 325 MG/1; MG/1
1 TABLET ORAL EVERY 6 HOURS PRN
COMMUNITY
End: 2021-02-11

## 2018-02-12 RX ORDER — CIPROFLOXACIN 500 MG/1
500 TABLET, FILM COATED ORAL 2 TIMES DAILY
Qty: 20 TABLET | Refills: 0 | Status: SHIPPED | OUTPATIENT
Start: 2018-02-12 | End: 2018-02-20 | Stop reason: ALTCHOICE

## 2018-02-12 RX ORDER — SULFAMETHOXAZOLE AND TRIMETHOPRIM 800; 160 MG/1; MG/1
1 TABLET ORAL 2 TIMES DAILY
Qty: 20 TABLET | Refills: 0 | Status: SHIPPED | OUTPATIENT
Start: 2018-02-12 | End: 2018-02-12 | Stop reason: HOSPADM

## 2018-02-12 RX ORDER — POTASSIUM CHLORIDE 750 MG/1
40 TABLET, FILM COATED, EXTENDED RELEASE ORAL ONCE
Status: COMPLETED | OUTPATIENT
Start: 2018-02-12 | End: 2018-02-12

## 2018-02-12 RX ADMIN — AMLODIPINE BESYLATE 10 MG: 10 TABLET ORAL at 09:10

## 2018-02-12 RX ADMIN — ATENOLOL 100 MG: 100 TABLET ORAL at 09:10

## 2018-02-12 RX ADMIN — HYDROCODONE BITARTRATE AND ACETAMINOPHEN 1 TABLET: 5; 325 TABLET ORAL at 00:27

## 2018-02-12 RX ADMIN — FAMOTIDINE 20 MG: 20 TABLET, FILM COATED ORAL at 04:43

## 2018-02-12 RX ADMIN — FAMOTIDINE 20 MG: 20 TABLET, FILM COATED ORAL at 09:10

## 2018-02-12 RX ADMIN — HYDROCODONE BITARTRATE AND ACETAMINOPHEN 1 TABLET: 5; 325 TABLET ORAL at 04:43

## 2018-02-12 RX ADMIN — HEPARIN SODIUM 5000 UNITS: 5000 INJECTION, SOLUTION INTRAVENOUS; SUBCUTANEOUS at 00:30

## 2018-02-12 RX ADMIN — POTASSIUM CHLORIDE 40 MEQ: 40 SOLUTION ORAL at 10:18

## 2018-02-12 RX ADMIN — METOCLOPRAMIDE 10 MG: 5 INJECTION, SOLUTION INTRAMUSCULAR; INTRAVENOUS at 01:49

## 2018-02-12 RX ADMIN — CLONIDINE HYDROCHLORIDE 0.2 MG: 0.2 TABLET ORAL at 09:10

## 2018-02-12 RX ADMIN — POTASSIUM CHLORIDE 40 MEQ: 750 TABLET, FILM COATED, EXTENDED RELEASE ORAL at 09:10

## 2018-02-12 ASSESSMENT — PAIN DESCRIPTION - ORIENTATION
ORIENTATION: LOWER
ORIENTATION: LOWER

## 2018-02-12 ASSESSMENT — PAIN DESCRIPTION - LOCATION
LOCATION: ABDOMEN;BACK

## 2018-02-12 ASSESSMENT — PAIN SCALES - GENERAL
PAINLEVEL_OUTOF10: 6
PAINLEVEL_OUTOF10: 7
PAINLEVEL_OUTOF10: 9
PAINLEVEL_OUTOF10: 8
PAINLEVEL_OUTOF10: 4
PAINLEVEL_OUTOF10: 8

## 2018-02-12 ASSESSMENT — PAIN DESCRIPTION - DESCRIPTORS: DESCRIPTORS: CONSTANT

## 2018-02-12 ASSESSMENT — PAIN DESCRIPTION - PAIN TYPE
TYPE: SURGICAL PAIN;CHRONIC PAIN
TYPE: SURGICAL PAIN;CHRONIC PAIN

## 2018-02-12 NOTE — DISCHARGE SUMMARY
23 - 33 meq/L    Glucose 113 (H) 70 - 108 mg/dL    BUN 12 7 - 22 mg/dL    CREATININE 1.3 (H) 0.4 - 1.2 mg/dL    Calcium 8.9 8.5 - 10.5 mg/dL   Anion Gap    Collection Time: 02/10/18  6:09 AM   Result Value Ref Range    Anion Gap 14.0 8.0 - 16.0 meq/L   Glomerular Filtration Rate, Estimated    Collection Time: 02/10/18  6:09 AM   Result Value Ref Range    Est, Glom Filt Rate 57 (A) ml/min/1.73m2   Potassium    Collection Time: 02/10/18  8:46 PM   Result Value Ref Range    Potassium 3.1 (L) 3.5 - 5.2 meq/L   Basic Metabolic Panel    Collection Time: 02/11/18  9:04 AM   Result Value Ref Range    Sodium 137 135 - 145 meq/L    Potassium 3.2 (L) 3.5 - 5.2 meq/L    Chloride 94 (L) 98 - 111 meq/L    CO2 28 23 - 33 meq/L    Glucose 172 (H) 70 - 108 mg/dL    BUN 11 7 - 22 mg/dL    CREATININE 1.2 0.4 - 1.2 mg/dL    Calcium 8.4 (L) 8.5 - 10.5 mg/dL   Magnesium    Collection Time: 02/11/18  9:04 AM   Result Value Ref Range    Magnesium 1.7 1.6 - 2.4 mg/dL   Anion Gap    Collection Time: 02/11/18  9:04 AM   Result Value Ref Range    Anion Gap 15.0 8.0 - 16.0 meq/L   Glomerular Filtration Rate, Estimated    Collection Time: 02/11/18  9:04 AM   Result Value Ref Range    Est, Glom Filt Rate 63 (A) ml/min/1.73m2   Potassium    Collection Time: 02/11/18  5:24 PM   Result Value Ref Range    Potassium 3.4 (L) 3.5 - 5.2 meq/L   Basic Metabolic Panel    Collection Time: 02/12/18  5:23 AM   Result Value Ref Range    Sodium 135 135 - 145 meq/L    Potassium 3.4 (L) 3.5 - 5.2 meq/L    Chloride 94 (L) 98 - 111 meq/L    CO2 26 23 - 33 meq/L    Glucose 169 (H) 70 - 108 mg/dL    BUN 11 7 - 22 mg/dL    CREATININE 1.2 0.4 - 1.2 mg/dL    Calcium 8.3 (L) 8.5 - 10.5 mg/dL   Anion Gap    Collection Time: 02/12/18  5:23 AM   Result Value Ref Range    Anion Gap 15.0 8.0 - 16.0 meq/L   Glomerular Filtration Rate, Estimated    Collection Time: 02/12/18  5:23 AM   Result Value Ref Range    Est, Glom Filt Rate 63 (A) ml/min/1.73m2       Patient Instructions: CARING FOR YOUR INCISION AT HOME    Wash your hands thoroughly with soap and water before touching your incision. Take a clean wash cloth with liquid antibacterial soap, for example Dial, and water and cleanse the incision. Pat dry with a clean towel. Steri strips will fall off on their own. Do NOT pick and or pull at them. Let the incision air dry before applying new dressing if needed. Do NOT place anything in or on your incision other than a clean dressing. Do NOT clean your incision with anything other than soap and water unless you were instructed to do so by your physician. Do NOT touch your incision unless your hands are clean and you are performing incision care. Try to touch it at least as possible. NO powders, lotions, perfumes, or deodorants near incisions. If you notice an increase in drainage, only use a clean piece of gauze to cover the incision and notify your physician. DR ROTH'S DISCHARGE INSTRUCTIONS    Pt Name: Juliet Mcnamara  Medical Record Number: [id number]  Today's Date: 2/11/2018    GENERAL ANESTHESIA OR SEDATION  1. Do not drive or operate hazardous machinery for 24 hours. 2. Do not make important business or personal decisions for 24 hours. 3. Do not drink alcoholic beverages or use tobacco for 24 hours. ACTIVITY INSTRUCTIONS:  [] Rest today. Resume light to normal activity tomorrow.   [] You may resume normal activity tomorrow. Do not engage in strenuous activity that may place stress on your incision. [x] Do not drive for 3-5 days and avoid heavy lifting, tugging, pullings greater than 10-20 lbs until seen in the office. DIET INSTRUCTIONS:  []Begin with clear liquids. If not nauseated, may increase to a low-fat diet when you desire. Greasy and spicy foods are not advised. [x]Regular diet as tolerated. []Other:     MEDICATIONS  [x]Prescription sent with you to be used as directed.    []Lortab   [x]Norco   []Percocet   []Tylenol #3   []Oxycontin   Do not drink alcohol or drive while taking these medications. You may experience dizziness or drowsiness with these medications. You may also experience constipation which can be relieved with stool softners or laxatives. [x]You may resume your daily prescription medication schedule unless otherwise specified. [x]Do not take 325mg Aspirin or other blood thinners such as Coumadin or Plavix for 5 days. WOUND/DRESSING INSTRUCTIONS:  Always ensure you and your care giver clean hands before and after caring for the wound. [x] Keep dressing clean and dry for 48 hours. Change when soiled or wet. [] Allow steri-strips to fall off on their own.   [] Ice operative site for 20 minutes 4 times a day. [x] May wash over incision in shower , but do not soak in a bath.  [] Take sitz bath for 20 minutes twice daily and after bowel movements. [x] Keep the abdominal binder in place during the day. May remove to shower and at night.  [] Remove packing from wound in 24 hours and replace with AMD dressings daily. [] Empty RESHMA drain daily and record the amounts. BREAST PROCEDURES  []Following a breast procedure, it is important to continue to where supportive garments. []Following a sentinal lymph node biopsy, you should not be alarmed if your urine has a blue color to it. This is your body eliminating the dye used for the procedure. ABDOMINAL/LAPAROSCOPIC SURGERY  [x]You are encouraged to get up and move around as this helps with the circulation and speeds up the healing process. [x]Breath deeply and cough from time to time. This helps to clear your lungs and helps prevent pneumonia. [x]Supporting your incision with a pillow or your hand helps to minimize discomfort and pain. []Laparoscopic patients may develop shoulder pain in the first 48 hours from the gas used during the procedure. FOLLOW-UP CARE.  SPECIFICALLY WATCH FOR:   Fever over 101 degrees by mouth   Increased redness, warmth, hardness at operative site. Blood soaked dressing (small amounts of oozing may be normal.)   Increased or progressive drainage from the surgical area   Inability to urinate or blood in the urine   Pain not relieved by the medications ordered   Persistent nausea and/or vomiting, unable to retain fluids. FOLLOW-UP APPOINTMENT   [x]1 week Dr. Dev Ferrell   []2 weeks   []Other    Call my office if you have any problem that concerns you [phone]. After hours, you can reach the answering service via the office phone number. IF YOU NEED IMMEDIATE ATTENTION, GO TO THE EMERGENCY ROOM AND YOUR DOCTOR WILL BE CONTACTED. Pauly Evans MD  [address] [id number]  [place], [address]  Electronically signed 2/11/2018 at 8:54 AM        Diet: DIET GENERAL;      Follow-up visits:   Sushil Sue DO  [address] [facility] [address]  [address] [facility], [address] Felicity Bracken [address]  [phone]    On 2/20/2018  10:30    Connie Roberson MD  [address] [facility]    On 3/1/2018  8:20am    Sushil Sue DO  [facility] [address]  [address] [facility]  [phone]    On 2/19/2018  10:00am       Discharge condition: fair  Disposition: Home  Time spent on discharge: 35 minutes     Discharge Medications:   Latoya Car   Home Medication Instructions [id number]    Printed on:02/12/18 0576   Medication Information                      albuterol (PROVENTIL HFA;VENTOLIN HFA) 108 (90 BASE) MCG/ACT inhaler  Inhale 1 puff into the lungs every 6 hours as needed for Wheezing. amLODIPine (NORVASC) 10 MG tablet  Take 10 mg by mouth daily             aspirin 81 MG tablet  Take 81 mg by mouth daily             azelastine (ASTELIN) 137 MCG/SPRAY nasal spray  1 spray by Nasal route as needed.  Use in each nostril as directed             ciprofloxacin (CIPRO) 500 MG tablet  Take 1 tablet by mouth 2 times daily for 10 days             cloNIDine (CATAPRES)

## 2018-02-12 NOTE — PROGRESS NOTES
[facility]         Renal Inpatient Follow-Up note         2/12/2018 11:47 AM    Pt Name:   Anda Schilder:   1960  Attending: Duncan Sanchez MD    Chief Complaint : Rafael Dunn is a 56 y.o. male being followed by nephrology for CAROLE    Interval History :   Patient seen and examined by me. No distress  He is going home today. Denies SOB, NVD. -146 today. Remains off Losartan and Tenoretc.      Scheduled Medications :    potassium chloride  40 mEq Oral BID    famotidine  20 mg Oral BID    metoclopramide  10 mg Intravenous Q8H    heparin (porcine)  5,000 Units Subcutaneous Q8H    potassium replacement protocol   Other RX Placeholder    amLODIPine  10 mg Oral Daily    cloNIDine  0.2 mg Oral TID    levothyroxine  175 mcg Oral Daily    sodium chloride flush  10 mL Intravenous 2 times per day    ciprofloxacin  400 mg Intravenous Q12H    atenolol  100 mg Oral Daily      0.9% NaCl with KCl 40 mEq Stopped (02/11/18 1605)       Vitals :  /74   Pulse 80   Temp 98 °F (36.7 °C) (Oral)   Resp 14   Ht 6' 1\" (1.854 m)   Wt (!) 316 lb 6.4 oz (143.5 kg)   SpO2 97%   BMI 41.74 kg/m²     24HR INTAKE/OUTPUT:      Intake/Output Summary (Last 24 hours) at 02/12/18 1147  Last data filed at 02/12/18 0600   Gross per 24 hour   Intake             3014 ml   Output             1205 ml   Net             1809 ml       Last 3 Weights  Wt Readings from Last 3 Encounters:   02/12/18 (!) 316 lb 6.4 oz (143.5 kg)   01/25/18 (!) 336 lb 9.6 oz (152.7 kg)   01/23/18 (!) 336 lb (152.4 kg)           Physical Exam :  Constitutional: alert and cooperative with exam, appears comfortable, no distress  Oral: moist oral mucus membranes  Neck: No JVD  Lungs: Clear  Heart: regular rate and rhythm, S1, S2   Extremities: No LE edema   GI: Obese, rounded, soft,non tender, no guarding, midline abd incision with staples, clean dry intact no drainage  : Not examined, mcintosh removed.

## 2018-02-19 ENCOUNTER — HOSPITAL ENCOUNTER (OUTPATIENT)
Age: 58
Discharge: HOME | End: 2018-02-19
Payer: MEDICARE

## 2018-02-19 DIAGNOSIS — E87.6 HYPOKALEMIA: ICD-10-CM

## 2018-02-19 LAB
ANION GAP SERPL CALCULATED.3IONS-SCNC: 12 MEQ/L (ref 8–16)
BUN BLDV-MCNC: 9 MG/DL (ref 7–22)
CALCIUM SERPL-MCNC: 8.9 MG/DL (ref 8.5–10.5)
CHLORIDE BLD-SCNC: 99 MEQ/L (ref 98–111)
CO2: 26 MEQ/L (ref 23–33)
CREAT SERPL-MCNC: 1.3 MG/DL (ref 0.4–1.2)
GFR SERPL CREATININE-BSD FRML MDRD: 57 ML/MIN/1.73M2
GLUCOSE BLD-MCNC: 112 MG/DL (ref 70–108)
POTASSIUM SERPL-SCNC: 4.5 MEQ/L (ref 3.5–5.2)
SODIUM BLD-SCNC: 137 MEQ/L (ref 135–145)

## 2018-02-19 PROCEDURE — 80048 BASIC METABOLIC PNL TOTAL CA: CPT

## 2018-02-19 PROCEDURE — 36415 COLL VENOUS BLD VENIPUNCTURE: CPT

## 2018-02-20 ENCOUNTER — OFFICE VISIT (OUTPATIENT)
Dept: SURGERY | Age: 58
End: 2018-02-20

## 2018-02-20 VITALS
HEIGHT: 73 IN | SYSTOLIC BLOOD PRESSURE: 130 MMHG | OXYGEN SATURATION: 96 % | BODY MASS INDEX: 41.75 KG/M2 | HEART RATE: 126 BPM | RESPIRATION RATE: 18 BRPM | TEMPERATURE: 99.6 F | DIASTOLIC BLOOD PRESSURE: 84 MMHG | WEIGHT: 315 LBS

## 2018-02-20 PROCEDURE — 99024 POSTOP FOLLOW-UP VISIT: CPT | Performed by: NURSE PRACTITIONER

## 2018-02-20 RX ORDER — CEPHALEXIN 500 MG/1
500 CAPSULE ORAL 4 TIMES DAILY
Qty: 40 CAPSULE | Refills: 0 | Status: SHIPPED | OUTPATIENT
Start: 2018-02-20 | End: 2018-03-02

## 2018-02-20 RX ORDER — METRONIDAZOLE 500 MG/1
500 TABLET ORAL 3 TIMES DAILY
Qty: 30 TABLET | Refills: 0 | Status: SHIPPED | OUTPATIENT
Start: 2018-02-20 | End: 2018-03-02

## 2018-02-20 ASSESSMENT — ENCOUNTER SYMPTOMS
APNEA: 0
VOICE CHANGE: 0
EYE PAIN: 0
WHEEZING: 0
CHEST TIGHTNESS: 0
RHINORRHEA: 0
SINUS PRESSURE: 0
EYE REDNESS: 0
CONSTIPATION: 1
STRIDOR: 0
CHOKING: 0
ANAL BLEEDING: 0
BACK PAIN: 0
TROUBLE SWALLOWING: 0
RECTAL PAIN: 0
SORE THROAT: 0
EYE DISCHARGE: 0
SHORTNESS OF BREATH: 0
BLOOD IN STOOL: 0
PHOTOPHOBIA: 0
EYE ITCHING: 0
FACIAL SWELLING: 0
COUGH: 0
NAUSEA: 0
COLOR CHANGE: 0
ABDOMINAL DISTENTION: 0
VOMITING: 0
DIARRHEA: 1
ABDOMINAL PAIN: 1

## 2018-02-20 NOTE — PROGRESS NOTES
10-15 lbs. Gradually ease into normal routine (total of 8 weeks after surgery) before lifting heavier objects. 3. Bowel Function: Continue stool softeners as needed. Over the counter- Colace or Miralax is recommended. Do not allow yourself to become constipated     4. Increase water to 64oz per day    5. Ambulate 4 times a day for 15 minutes each time to help increase increase stamina and help stimulate GI motility    6. Continue at home diet- may need to eat smaller more frequent meals     7. Call for any other the follow symptoms:    Fever over 101 degrees by mouth   Increased redness, warmth, hardness at operative site   Blood soaked dressing (small amounts of oozing may be normal)   Increased or progressive drainage from the surgical area   Inability to urinate or blood in the urine   Pain not relieved by the medications ordered   Persistent nausea and/or vomiting, unable to retain fluids   Pain or swelling in your legs   Shortness of breath or chest pain    8. Signs and symptoms have been reviewed with patient that would be concerning and need her to return to office for re-evaluation. Patient states he will call if she has questions or concerns. Monitor heart rate at home, call Melinda Tyson                   Discussed use, benefit, and side effects of prescribed medications. All patient questions answered. Pt voiced understanding.      Electronically signed by Anamika Enriquez CNP on 2/20/2018 at 2:02 PM

## 2018-02-22 ENCOUNTER — TELEPHONE (OUTPATIENT)
Dept: SURGERY | Age: 58
End: 2018-02-22

## 2018-02-24 LAB
AEROBIC CULTURE: ABNORMAL
AEROBIC CULTURE: ABNORMAL
ANAEROBIC CULTURE: ABNORMAL
GRAM STAIN RESULT: ABNORMAL
ORGANISM: ABNORMAL

## 2018-02-27 ENCOUNTER — OFFICE VISIT (OUTPATIENT)
Dept: SURGERY | Age: 58
End: 2018-02-27

## 2018-02-27 VITALS
OXYGEN SATURATION: 94 % | HEIGHT: 73 IN | DIASTOLIC BLOOD PRESSURE: 84 MMHG | TEMPERATURE: 99.4 F | RESPIRATION RATE: 20 BRPM | HEART RATE: 116 BPM | BODY MASS INDEX: 40.79 KG/M2 | WEIGHT: 307.8 LBS | SYSTOLIC BLOOD PRESSURE: 136 MMHG

## 2018-02-27 DIAGNOSIS — Z90.49 S/P PARTIAL RESECTION OF COLON: Primary | ICD-10-CM

## 2018-02-27 PROCEDURE — 99024 POSTOP FOLLOW-UP VISIT: CPT | Performed by: NURSE PRACTITIONER

## 2018-02-27 RX ORDER — DIAZEPAM 5 MG/1
5 TABLET ORAL EVERY 8 HOURS PRN
Qty: 20 TABLET | Refills: 0 | Status: SHIPPED | OUTPATIENT
Start: 2018-02-27 | End: 2018-03-09

## 2018-02-27 ASSESSMENT — ENCOUNTER SYMPTOMS
STRIDOR: 0
BACK PAIN: 0
VOMITING: 0
EYE DISCHARGE: 0
EYE ITCHING: 0
TROUBLE SWALLOWING: 0
WHEEZING: 0
RHINORRHEA: 0
ABDOMINAL DISTENTION: 0
SHORTNESS OF BREATH: 0
DIARRHEA: 0
NAUSEA: 0
COUGH: 0
COLOR CHANGE: 0
BLOOD IN STOOL: 0
ABDOMINAL PAIN: 1
CHOKING: 0
EYE REDNESS: 0
PHOTOPHOBIA: 0
VOICE CHANGE: 0
RECTAL PAIN: 0
CONSTIPATION: 0
SORE THROAT: 0
EYE PAIN: 0
SINUS PRESSURE: 0
ANAL BLEEDING: 0
FACIAL SWELLING: 0
APNEA: 0
CHEST TIGHTNESS: 0

## 2018-02-27 NOTE — PROGRESS NOTES
[facility]  [facility]  [address] [address]  [address] [address]  Dept: [phone]  Dept Fax: [phone]  Loc: [phone]    Visit Date: 2/27/2018       Ishmael Fabry is a 56 y.o. male who presents today for:  Chief Complaint   Patient presents with    Post-Op Check     S/p Abdominal exploration. Lysis of severe adhesions. Extended right colon resection. 2/5/18       HPI:     Felisa Kaur presents today for a 3 week post op check. Today He complains of a decreased appetite and abdominal cramping. He is not resting well at night and appears fatigued. The pain medication that he is taking from PCP is not giving him enough relief to get comfortable at night. A phone call out to his PCP for suggestions. The decreased appetite is normal suggested supplements, overall he looks better clinically then last visit, heart rate is better controlled, no nausea and vomiting. He has complaints of abdominal cramping, stopped flexirl and started valium, works well with abdominal spasm. He is still having constipation issues. The incision is slightly opened, still draining as expected, overall looks better, approx half staples removed, still has abdominal binder on. We will follow up in one week call if any new concerns. The wound cultures were + for candida, covered with flagyl.   Still on kelfex and flagyl      Past Medical History:   Diagnosis Date    Arthritis     Back problem     Constipation     Hypertension     Sleep apnea     has CPAP    Thyroid disease       Past Surgical History:   Procedure Laterality Date    APPENDECTOMY      CARPAL TUNNEL RELEASE Bilateral     COLONOSCOPY  2017    Dr. Ann Ba      x3 surgeries with 14 repairs    KNEE SURGERY Right 1980's    cartilage    UT EXPLORATORY OF ABDOMEN N/A 2/5/2018    ABDOMINAL EXPLORATION WITH LYSIS OF COMPLICATED ADHESIONS WITH AN EXTENDED RIGHT COLON RESECTION performed by Rebecca Bennett MD

## 2018-03-01 ENCOUNTER — OFFICE VISIT (OUTPATIENT)
Dept: NEPHROLOGY | Age: 58
End: 2018-03-01
Payer: MEDICARE

## 2018-03-01 ENCOUNTER — HOSPITAL ENCOUNTER (OUTPATIENT)
Age: 58
Discharge: HOME | End: 2018-03-01
Payer: MEDICARE

## 2018-03-01 VITALS
OXYGEN SATURATION: 95 % | BODY MASS INDEX: 40.11 KG/M2 | WEIGHT: 304 LBS | HEART RATE: 111 BPM | SYSTOLIC BLOOD PRESSURE: 118 MMHG | DIASTOLIC BLOOD PRESSURE: 80 MMHG

## 2018-03-01 DIAGNOSIS — N17.8 OTHER ACUTE KIDNEY FAILURE (CODE): Primary | ICD-10-CM

## 2018-03-01 DIAGNOSIS — I10 ESSENTIAL HYPERTENSION: ICD-10-CM

## 2018-03-01 DIAGNOSIS — N17.8 OTHER ACUTE KIDNEY FAILURE (CODE): ICD-10-CM

## 2018-03-01 DIAGNOSIS — E87.6 HYPOKALEMIA: ICD-10-CM

## 2018-03-01 LAB
ANION GAP SERPL CALCULATED.3IONS-SCNC: 12 MEQ/L (ref 8–16)
BUN BLDV-MCNC: 10 MG/DL (ref 7–22)
CALCIUM SERPL-MCNC: 9.6 MG/DL (ref 8.5–10.5)
CHLORIDE BLD-SCNC: 99 MEQ/L (ref 98–111)
CO2: 28 MEQ/L (ref 23–33)
CREAT SERPL-MCNC: 1 MG/DL (ref 0.4–1.2)
GFR SERPL CREATININE-BSD FRML MDRD: 77 ML/MIN/1.73M2
GLUCOSE BLD-MCNC: 113 MG/DL (ref 70–108)
POTASSIUM SERPL-SCNC: 4.6 MEQ/L (ref 3.5–5.2)
SODIUM BLD-SCNC: 139 MEQ/L (ref 135–145)

## 2018-03-01 PROCEDURE — 3017F COLORECTAL CA SCREEN DOC REV: CPT | Performed by: INTERNAL MEDICINE

## 2018-03-01 PROCEDURE — 4004F PT TOBACCO SCREEN RCVD TLK: CPT | Performed by: INTERNAL MEDICINE

## 2018-03-01 PROCEDURE — 84244 ASSAY OF RENIN: CPT

## 2018-03-01 PROCEDURE — G8417 CALC BMI ABV UP PARAM F/U: HCPCS | Performed by: INTERNAL MEDICINE

## 2018-03-01 PROCEDURE — 1111F DSCHRG MED/CURRENT MED MERGE: CPT | Performed by: INTERNAL MEDICINE

## 2018-03-01 PROCEDURE — G8482 FLU IMMUNIZE ORDER/ADMIN: HCPCS | Performed by: INTERNAL MEDICINE

## 2018-03-01 PROCEDURE — 80048 BASIC METABOLIC PNL TOTAL CA: CPT

## 2018-03-01 PROCEDURE — 82088 ASSAY OF ALDOSTERONE: CPT

## 2018-03-01 PROCEDURE — 99213 OFFICE O/P EST LOW 20 MIN: CPT | Performed by: INTERNAL MEDICINE

## 2018-03-01 PROCEDURE — 36415 COLL VENOUS BLD VENIPUNCTURE: CPT

## 2018-03-01 PROCEDURE — G8427 DOCREV CUR MEDS BY ELIG CLIN: HCPCS | Performed by: INTERNAL MEDICINE

## 2018-03-01 RX ORDER — POTASSIUM CHLORIDE 750 MG/1
10 CAPSULE, EXTENDED RELEASE ORAL DAILY
Refills: 5 | COMMUNITY
Start: 2018-02-05 | End: 2018-06-07 | Stop reason: DRUGHIGH

## 2018-03-01 NOTE — PROGRESS NOTES
[facility]    [facility], [address]   [place], [address] [address] [facility]  [phone]  Progress Note  3/1/2018 8:35 AM      Pt Name:    Eri Manning  YOB: 1960  Primary Care Physician:  Du Stevens DO     Chief Complaint:   Chief Complaint   Patient presents with    Follow-Up from Hospital     CAROLE, hypokalemia and HTN        Background Information/Interval History:   56 male with HTN, hernia surgeries, chronic constipation, thyroid disorder, BPH who was recently admitted for elective abd exp surgery with JENNY due to recurrent small bowel obstructions. He has had chronic constipation. Post operatively  few doses of IV toradol and had CAROLE. Nephrology saw him for this problems. He improved and was discharged. He is now here for follow-up. He is reporting to be feeling better. No chest pain or shortness of breath. No nausea or vomiting. No leg swelling. He reports BP is \"within normal limits. \" He is on norvasc, clonidine and cozaar for HTN. He is on K-dur TID for chronic hypokalemia. He does not recall if any cause of hypokalemia was found or investigated in the past. He was, however, taking chlorthalidone previously.       Past History:  Past Medical History:   Diagnosis Date    Arthritis     Back problem     Constipation     Hypertension     Sleep apnea     has CPAP    Thyroid disease      Past Surgical History:   Procedure Laterality Date    APPENDECTOMY      CARPAL TUNNEL RELEASE Bilateral     COLONOSCOPY  2017    Dr. Reva Boone      x3 surgeries with 14 repairs    KNEE SURGERY Right 1980's    cartilage    AR EXPLORATORY OF ABDOMEN N/A 2/5/2018    ABDOMINAL EXPLORATION WITH LYSIS OF COMPLICATED ADHESIONS WITH AN EXTENDED RIGHT COLON RESECTION performed by Froylan Bryson MD at [address]:  /80 (Site: Left Arm, Position: Sitting, Cuff Size: Large Adult)   Pulse 111   Wt (!) 304 lb (137.9 kg)   SpO2 95%   BMI 40.11 kg/m²   Wt Readings

## 2018-03-02 ENCOUNTER — TELEPHONE (OUTPATIENT)
Dept: SURGERY | Age: 58
End: 2018-03-02

## 2018-03-03 LAB
ALDOSTERONE: 15.8 NG/DL
RENIN ACTIVITY: 4.3 NG/ML/HR

## 2018-03-05 ENCOUNTER — TELEPHONE (OUTPATIENT)
Dept: NEPHROLOGY | Age: 58
End: 2018-03-05

## 2018-03-05 DIAGNOSIS — I10 ESSENTIAL HYPERTENSION: Primary | ICD-10-CM

## 2018-03-06 ENCOUNTER — OFFICE VISIT (OUTPATIENT)
Dept: SURGERY | Age: 58
End: 2018-03-06

## 2018-03-06 VITALS
HEART RATE: 119 BPM | RESPIRATION RATE: 18 BRPM | BODY MASS INDEX: 40.37 KG/M2 | DIASTOLIC BLOOD PRESSURE: 86 MMHG | OXYGEN SATURATION: 94 % | WEIGHT: 306 LBS | TEMPERATURE: 98.8 F | SYSTOLIC BLOOD PRESSURE: 126 MMHG

## 2018-03-06 DIAGNOSIS — Z90.49 S/P PARTIAL RESECTION OF COLON: ICD-10-CM

## 2018-03-06 DIAGNOSIS — Z51.89 VISIT FOR WOUND CHECK: Primary | ICD-10-CM

## 2018-03-06 PROCEDURE — 99024 POSTOP FOLLOW-UP VISIT: CPT | Performed by: NURSE PRACTITIONER

## 2018-03-06 ASSESSMENT — ENCOUNTER SYMPTOMS
SORE THROAT: 0
STRIDOR: 0
PHOTOPHOBIA: 0
SINUS PRESSURE: 0
FACIAL SWELLING: 0
WHEEZING: 0
BLOOD IN STOOL: 0
APNEA: 0
RECTAL PAIN: 0
ANAL BLEEDING: 0
EYE REDNESS: 0
CHOKING: 0
EYE DISCHARGE: 0
EYE PAIN: 0
RHINORRHEA: 0
BACK PAIN: 0
ABDOMINAL DISTENTION: 0
TROUBLE SWALLOWING: 0
CHEST TIGHTNESS: 0
CONSTIPATION: 1
DIARRHEA: 0
NAUSEA: 0
VOMITING: 0
EYE ITCHING: 0
VOICE CHANGE: 0
COUGH: 0
COLOR CHANGE: 0
ABDOMINAL PAIN: 1
SHORTNESS OF BREATH: 0

## 2018-03-06 NOTE — PROGRESS NOTES
[facility]  [facility]  [address] [address] [address]  [address] [facility]  Dept: [phone]  Dept Fax: [phone]  Loc: [phone]    Visit Date: 3/6/2018       Juliet Mcnamara is a 56 y.o. male who presents today for:  Chief Complaint   Patient presents with    Post-Op Check     S/p Abdominal exploration. Lysis of severe adhesions. Extended right colon resection. 2/5/18       HPI:     Poonam Singh presents today for a 4 week post op check. Today He complains of abdominal discomfort, sleep disturbance and activity changes. We have started valium last visit to assist with the abdominal cramping, spasms, and sleep, seems to be helping a little. He had good sleep two nights ago, but had a bad night last night. He starting to have better days. The incision is still oozing clean up with saline and gauze, silver nitrate applied to tissue. Removed the remaining staples. His bowels have not established a pattern yet and goes between constipation and diarrhea. Has not taken any laxatives x 2 weeks. Discussed metamucil. We talked about sleep at great length, suggested to try melatonin, start at either 1-2 mg. May help him sleep longer. We will see the patient in 2 weeks.   Call if any concerns       Past Medical History:   Diagnosis Date    Arthritis     Back problem     Constipation     Hypertension     Sleep apnea     has CPAP    Thyroid disease       Past Surgical History:   Procedure Laterality Date    APPENDECTOMY      CARPAL TUNNEL RELEASE Bilateral     COLONOSCOPY  2017    Dr. Phogn Rosa      x3 surgeries with 14 repairs    KNEE SURGERY Right 1980's    cartilage    MI EXPLORATORY OF ABDOMEN N/A 2/5/2018    ABDOMINAL EXPLORATION WITH LYSIS OF COMPLICATED ADHESIONS WITH AN EXTENDED RIGHT COLON RESECTION performed by Claudia Tnea MD at [address] [place] [address] History   Problem Relation Age of Onset    Cancer Mother      breast    Heart Disease Father bladder, lung    Cancer Father     Stroke Brother     Diabetes Paternal Grandmother     High Blood Pressure Neg Hx      Social History   Substance Use Topics    Smoking status: Never Smoker    Smokeless tobacco: Current User     Types: Snuff      Comment: quit pipe over 30 yrs ago    Alcohol use No      Comment: quit        Current Outpatient Prescriptions   Medication Sig Dispense Refill    potassium chloride (MICRO-K) 10 MEQ extended release capsule Take 10 mEq by mouth daily   5    diazepam (VALIUM) 5 MG tablet Take 1 tablet by mouth every 8 hours as needed (abdominal spasms) for up to 10 days. 20 tablet 0    oxyCODONE-acetaminophen (PERCOCET)  MG per tablet Take 1 tablet by mouth every 6 hours as needed for Pain.  cloNIDine (CATAPRES) 0.2 MG tablet Take 0.2 mg by mouth 3 times daily      levothyroxine (SYNTHROID) 175 MCG tablet Take 175 mcg by mouth Daily      oxybutynin (DITROPAN) 5 MG tablet Take 1 tablet by mouth 3 times daily 90 tablet 3    tamsulosin (FLOMAX) 0.4 MG capsule Take 1 capsule by mouth nightly 90 capsule 3    pantoprazole (PROTONIX) 40 MG tablet Take 1 tablet by mouth every morning (before breakfast) 30 tablet 0    amLODIPine (NORVASC) 10 MG tablet Take 10 mg by mouth daily      aspirin 81 MG tablet Take 81 mg by mouth daily      losartan (COZAAR) 100 MG tablet Take 100 mg by mouth daily      polyethylene glycol (GLYCOLAX) powder Take 17 g by mouth daily as needed       cyclobenzaprine (FLEXERIL) 10 MG tablet Take 10 mg by mouth 3 times daily as needed for Muscle spasms      albuterol (PROVENTIL HFA;VENTOLIN HFA) 108 (90 BASE) MCG/ACT inhaler Inhale 1 puff into the lungs every 6 hours as needed for Wheezing.  zolpidem (AMBIEN CR) 12.5 MG CR tablet Take 12.5 mg by mouth nightly as needed.  azelastine (ASTELIN) 137 MCG/SPRAY nasal spray 1 spray by Nasal route as needed.  Use in each nostril as directed       No current facility-administered medications hallucinations, self-injury, sleep disturbance and suicidal ideas. The patient is not nervous/anxious and is not hyperactive. Objective:     /86 (Site: Left Arm, Position: Sitting, Cuff Size: Medium Adult)   Pulse 119   Temp 98.8 °F (37.1 °C) (Tympanic)   Resp 18   Wt (!) 306 lb (138.8 kg)   SpO2 94%   BMI 40.37 kg/m²     Wt Readings from Last 3 Encounters:   03/06/18 (!) 306 lb (138.8 kg)   03/01/18 (!) 304 lb (137.9 kg)   02/27/18 (!) 307 lb 12.8 oz (139.6 kg)       Physical Exam   Constitutional:   Overweight    HENT:   Head: Normocephalic. Cardiovascular: Normal rate. Pulmonary/Chest: Effort normal.   Abdominal: There is tenderness. Musculoskeletal: Normal range of motion. Neurological: He is alert. Skin: Skin is warm. Assessment/Plan:     Demond Webber was seen today for post-op check. Diagnoses and all orders for this visit:    Visit for wound check    S/P partial resection of colon        Return in about 2 weeks (around 3/20/2018). Patient Instructions   1. Continue wound care. Monitor for redness, swelling, or purulent drainage. No lotions, ointments, alcohol or peroxide to incision sites. 2. Maintain lifting restrictions for the full 6 weeks after surgery. No heavy lifting, pulling, or tugging greater than 10-15 lbs. Gradually ease into normal routine (total of 8 weeks after surgery) before lifting heavier objects. 3. Bowel Function: Continue stool softeners as needed. Over the counter- Colace or Miralax is recommended. Do not allow yourself to become constipated     4. Increase water to 64oz per day    5. Ambulate 4 times a day for 15 minutes each time to help increase increase stamina and help stimulate GI motility    6. Continue at home diet- may need to eat smaller more frequent meals     7.  Call for any other the follow symptoms:    Fever over 101 degrees by mouth   Increased redness, warmth, hardness at operative site   Blood soaked dressing (small

## 2018-03-08 ENCOUNTER — TELEPHONE (OUTPATIENT)
Dept: SURGERY | Age: 58
End: 2018-03-08

## 2018-03-08 DIAGNOSIS — G89.18 ACUTE POSTOPERATIVE PAIN: Primary | ICD-10-CM

## 2018-03-08 RX ORDER — HYDROMORPHONE HYDROCHLORIDE 2 MG/1
1 TABLET ORAL NIGHTLY PRN
Qty: 7 TABLET | Refills: 0 | Status: SHIPPED | OUTPATIENT
Start: 2018-03-08 | End: 2018-03-15

## 2018-03-08 NOTE — PROGRESS NOTES
Patient continues to have issues sleeping at night as a result of pain. As discussed with Erlin Tilley we will prescribe dilaudid at bedtime, patient currently on percocet for chronic pain and we are having a hard time controlling the pain at night. I started him on 1 mg at bedtime only with instructions to avoid percocet and the valium at bedtime. We will see if this works better. We may have to go to 2 mg nightly. Well call back Monday for an update.

## 2018-03-16 ENCOUNTER — TELEPHONE (OUTPATIENT)
Dept: SURGERY | Age: 58
End: 2018-03-16

## 2018-03-16 RX ORDER — DICYCLOMINE HYDROCHLORIDE 10 MG/1
10 CAPSULE ORAL 4 TIMES DAILY
Qty: 30 CAPSULE | Refills: 0 | Status: SHIPPED | OUTPATIENT
Start: 2018-03-16 | End: 2018-06-07 | Stop reason: ALTCHOICE

## 2018-03-20 ENCOUNTER — OFFICE VISIT (OUTPATIENT)
Dept: SURGERY | Age: 58
End: 2018-03-20

## 2018-03-20 ENCOUNTER — TELEPHONE (OUTPATIENT)
Dept: SURGERY | Age: 58
End: 2018-03-20

## 2018-03-20 VITALS
DIASTOLIC BLOOD PRESSURE: 98 MMHG | SYSTOLIC BLOOD PRESSURE: 152 MMHG | OXYGEN SATURATION: 93 % | WEIGHT: 305.8 LBS | HEART RATE: 90 BPM | HEIGHT: 73 IN | TEMPERATURE: 98.4 F | RESPIRATION RATE: 18 BRPM | BODY MASS INDEX: 40.53 KG/M2

## 2018-03-20 DIAGNOSIS — R10.9 ABDOMINAL CRAMPING: ICD-10-CM

## 2018-03-20 DIAGNOSIS — Z90.49 S/P PARTIAL RESECTION OF COLON: Primary | ICD-10-CM

## 2018-03-20 PROCEDURE — 99024 POSTOP FOLLOW-UP VISIT: CPT | Performed by: NURSE PRACTITIONER

## 2018-03-20 ASSESSMENT — ENCOUNTER SYMPTOMS: RESPIRATORY NEGATIVE: 1

## 2018-03-20 NOTE — TELEPHONE ENCOUNTER
Pt seen in office today. BP checked 3 times. Elevated. Pt took all of meds this day. Please call pt with any new orders. Thanks.

## 2018-03-20 NOTE — PROGRESS NOTES
(ASTELIN) 137 MCG/SPRAY nasal spray 1 spray by Nasal route as needed. Use in each nostril as directed       No current facility-administered medications for this visit. Allergies   Allergen Reactions    Shellfish-Derived Products Swelling    Pcn [Penicillins] Itching    Succinylcholine      Pseudocholinesterase Deficiency    Morphine Nausea And Vomiting     \"head swimming, skin crawling\"    Tape [Adhesive Tape] Rash       Subjective:      Review of Systems   Constitutional: Positive for activity change and fatigue. HENT: Negative. Respiratory: Negative. Cardiovascular: Negative. Gastrointestinal:        Cramping, tenderness       Objective:     BP (!) 152/98 (Site: Left Arm, Position: Sitting, Cuff Size: Medium Adult)   Pulse 90   Temp 98.4 °F (36.9 °C) (Oral)   Resp 18   Ht 6' 1\" (1.854 m)   Wt (!) 305 lb 12.8 oz (138.7 kg)   SpO2 93%   BMI 40.35 kg/m²     Wt Readings from Last 3 Encounters:   03/20/18 (!) 305 lb 12.8 oz (138.7 kg)   03/06/18 (!) 306 lb (138.8 kg)   03/01/18 (!) 304 lb (137.9 kg)       Physical Exam   Constitutional:   Overweight    HENT:   Head: Normocephalic. Cardiovascular: Normal rate. Pulmonary/Chest: Effort normal.   Abdominal: There is tenderness. Musculoskeletal: Normal range of motion. Neurological: He is alert. Skin: Skin is warm. Assessment/Plan:     Ely Proctor was seen today for post-op check. Diagnoses and all orders for this visit:    S/P partial resection of colon  -     CT Enterography W Contrast; Future    Abdominal cramping  -     CT Enterography W Contrast; Future        No Follow-up on file. Patient Instructions   1. Continue wound care. Monitor for redness, swelling, or purulent drainage. No lotions, ointments, alcohol or peroxide to incision sites. 2. No restrictions    3. Bowel Function: Continue stool softeners as needed. Over the counter- Colace or Miralax is recommended.   Do not allow yourself to become constipated 4. Increase water to 64oz per day    5. Ambulate 4 times a day for 15 minutes each time to help increase increase stamina and help stimulate GI motility    6. Continue at home diet- may need to eat smaller more frequent meals     7. Call for any other the follow symptoms:    Fever over 101 degrees by mouth   Increased redness, warmth, hardness at operative site   Blood soaked dressing (small amounts of oozing may be normal)   Increased or progressive drainage from the surgical area   Inability to urinate or blood in the urine   Pain not relieved by the medications ordered   Persistent nausea and/or vomiting, unable to retain fluids   Pain or swelling in your legs   Shortness of breath or chest pain    8. Signs and symptoms have been reviewed with patient that would be concerning and need her to return to office for re-evaluation. Patient states he will call if she has questions or concerns. Discussed use, benefit, and side effects of prescribed medications. All patient questions answered. Pt voiced understanding.      Electronically signed by Cathy Ramon CNP on 3/20/2018 at 4:25 PM

## 2018-03-26 ENCOUNTER — TELEPHONE (OUTPATIENT)
Dept: SURGERY | Age: 58
End: 2018-03-26

## 2018-03-27 ENCOUNTER — TELEPHONE (OUTPATIENT)
Dept: SURGERY | Age: 58
End: 2018-03-27

## 2018-03-28 ENCOUNTER — TELEPHONE (OUTPATIENT)
Dept: SURGERY | Age: 58
End: 2018-03-28

## 2018-03-28 NOTE — TELEPHONE ENCOUNTER
[facility] approved the CT enterography with oral & rectal contrast. The auth # is [id number] good from 3/20/18 to 4/19/18. Ct is scheduled on Wed 4/11 at 9:40am & Sebastian Villa will arrive to outpatient express testing at 7:40am. He will be npo 4 hours prior to the CT.  Follow up appt was made on 4/12 with Dr Ada Paulino to go over the results per patients request.

## 2018-04-11 ENCOUNTER — HOSPITAL ENCOUNTER (OUTPATIENT)
Dept: CT IMAGING | Age: 58
Discharge: HOME | End: 2018-04-11
Payer: MEDICARE

## 2018-04-11 DIAGNOSIS — R10.9 ABDOMINAL CRAMPING: ICD-10-CM

## 2018-04-11 DIAGNOSIS — Z90.49 S/P PARTIAL RESECTION OF COLON: ICD-10-CM

## 2018-04-11 PROCEDURE — 74176 CT ABD & PELVIS W/O CONTRAST: CPT

## 2018-04-12 ENCOUNTER — OFFICE VISIT (OUTPATIENT)
Dept: SURGERY | Age: 58
End: 2018-04-12

## 2018-04-12 VITALS
RESPIRATION RATE: 18 BRPM | OXYGEN SATURATION: 94 % | BODY MASS INDEX: 40.82 KG/M2 | WEIGHT: 308 LBS | HEART RATE: 63 BPM | DIASTOLIC BLOOD PRESSURE: 84 MMHG | SYSTOLIC BLOOD PRESSURE: 144 MMHG | TEMPERATURE: 98 F | HEIGHT: 73 IN

## 2018-04-12 DIAGNOSIS — R10.84 GENERALIZED ABDOMINAL PAIN: Primary | ICD-10-CM

## 2018-04-12 DIAGNOSIS — Z98.890 S/P EXPLORATORY LAPAROTOMY: ICD-10-CM

## 2018-04-12 PROCEDURE — 99024 POSTOP FOLLOW-UP VISIT: CPT | Performed by: SURGERY

## 2018-05-01 ENCOUNTER — HOSPITAL ENCOUNTER (OUTPATIENT)
Age: 58
Discharge: HOME | End: 2018-05-01
Payer: MEDICARE

## 2018-05-01 LAB — CLOSTRIDIUM DIFFICILE, PCR: NEGATIVE

## 2018-05-01 PROCEDURE — 87493 C DIFF AMPLIFIED PROBE: CPT

## 2018-05-01 PROCEDURE — 87045 FECES CULTURE AEROBIC BACT: CPT

## 2018-05-01 PROCEDURE — 87427 SHIGA-LIKE TOXIN AG IA: CPT

## 2018-05-03 LAB — CULTURE, STOOL: NORMAL

## 2018-05-22 ENCOUNTER — OFFICE VISIT (OUTPATIENT)
Dept: SURGERY | Age: 58
End: 2018-05-22
Payer: MEDICARE

## 2018-05-22 VITALS
DIASTOLIC BLOOD PRESSURE: 70 MMHG | TEMPERATURE: 98 F | SYSTOLIC BLOOD PRESSURE: 130 MMHG | HEART RATE: 60 BPM | WEIGHT: 313 LBS | OXYGEN SATURATION: 96 % | RESPIRATION RATE: 18 BRPM | BODY MASS INDEX: 41.48 KG/M2 | HEIGHT: 73 IN

## 2018-05-22 DIAGNOSIS — Z51.89 VISIT FOR WOUND CHECK: Primary | ICD-10-CM

## 2018-05-22 PROCEDURE — 99212 OFFICE O/P EST SF 10 MIN: CPT | Performed by: SURGERY

## 2018-05-22 PROCEDURE — G8417 CALC BMI ABV UP PARAM F/U: HCPCS | Performed by: SURGERY

## 2018-05-22 PROCEDURE — 3017F COLORECTAL CA SCREEN DOC REV: CPT | Performed by: SURGERY

## 2018-05-22 PROCEDURE — 4004F PT TOBACCO SCREEN RCVD TLK: CPT | Performed by: SURGERY

## 2018-05-22 PROCEDURE — G8427 DOCREV CUR MEDS BY ELIG CLIN: HCPCS | Performed by: SURGERY

## 2018-05-22 ASSESSMENT — ENCOUNTER SYMPTOMS
PHOTOPHOBIA: 0
RHINORRHEA: 0
COLOR CHANGE: 0
EYE DISCHARGE: 0
CHOKING: 0
VOICE CHANGE: 0
SINUS PAIN: 0
COUGH: 0
TROUBLE SWALLOWING: 0
FACIAL SWELLING: 0
APNEA: 0
EYE REDNESS: 0
SHORTNESS OF BREATH: 0
CHEST TIGHTNESS: 0
BACK PAIN: 0
WHEEZING: 0
EYE PAIN: 0
STRIDOR: 0
SINUS PRESSURE: 0
EYE ITCHING: 0
SORE THROAT: 0

## 2018-06-06 ENCOUNTER — HOSPITAL ENCOUNTER (OUTPATIENT)
Age: 58
Discharge: HOME | End: 2018-06-06
Payer: MEDICARE

## 2018-06-06 DIAGNOSIS — E87.6 HYPOKALEMIA: ICD-10-CM

## 2018-06-06 DIAGNOSIS — I10 ESSENTIAL HYPERTENSION: ICD-10-CM

## 2018-06-06 DIAGNOSIS — N17.8 OTHER ACUTE KIDNEY FAILURE (CODE): ICD-10-CM

## 2018-06-06 LAB
ANION GAP SERPL CALCULATED.3IONS-SCNC: 13 MEQ/L (ref 8–16)
BUN BLDV-MCNC: 12 MG/DL (ref 7–22)
CALCIUM SERPL-MCNC: 9.2 MG/DL (ref 8.5–10.5)
CHLORIDE BLD-SCNC: 96 MEQ/L (ref 98–111)
CO2: 29 MEQ/L (ref 23–33)
CREAT SERPL-MCNC: 1 MG/DL (ref 0.4–1.2)
GFR SERPL CREATININE-BSD FRML MDRD: 77 ML/MIN/1.73M2
GLUCOSE BLD-MCNC: 122 MG/DL (ref 70–108)
POTASSIUM SERPL-SCNC: 2.8 MEQ/L (ref 3.5–5.2)
SODIUM BLD-SCNC: 138 MEQ/L (ref 135–145)

## 2018-06-06 PROCEDURE — 80048 BASIC METABOLIC PNL TOTAL CA: CPT

## 2018-06-06 PROCEDURE — 36415 COLL VENOUS BLD VENIPUNCTURE: CPT

## 2018-06-07 ENCOUNTER — TELEPHONE (OUTPATIENT)
Dept: NEPHROLOGY | Age: 58
End: 2018-06-07

## 2018-06-07 ENCOUNTER — OFFICE VISIT (OUTPATIENT)
Dept: NEPHROLOGY | Age: 58
End: 2018-06-07
Payer: MEDICARE

## 2018-06-07 ENCOUNTER — OFFICE VISIT (OUTPATIENT)
Dept: SURGERY | Age: 58
End: 2018-06-07
Payer: MEDICARE

## 2018-06-07 VITALS
RESPIRATION RATE: 18 BRPM | TEMPERATURE: 98.3 F | BODY MASS INDEX: 41.75 KG/M2 | SYSTOLIC BLOOD PRESSURE: 134 MMHG | DIASTOLIC BLOOD PRESSURE: 80 MMHG | HEIGHT: 73 IN | HEART RATE: 66 BPM | WEIGHT: 315 LBS

## 2018-06-07 VITALS
BODY MASS INDEX: 41.61 KG/M2 | SYSTOLIC BLOOD PRESSURE: 130 MMHG | OXYGEN SATURATION: 96 % | WEIGHT: 315 LBS | DIASTOLIC BLOOD PRESSURE: 80 MMHG | HEART RATE: 56 BPM

## 2018-06-07 DIAGNOSIS — E87.6 SERUM POTASSIUM DECREASED: ICD-10-CM

## 2018-06-07 DIAGNOSIS — Z51.89 VISIT FOR WOUND CHECK: Primary | ICD-10-CM

## 2018-06-07 DIAGNOSIS — I10 ESSENTIAL HYPERTENSION: ICD-10-CM

## 2018-06-07 DIAGNOSIS — E87.6 HYPOKALEMIA: Primary | ICD-10-CM

## 2018-06-07 PROCEDURE — G8427 DOCREV CUR MEDS BY ELIG CLIN: HCPCS | Performed by: SURGERY

## 2018-06-07 PROCEDURE — 4004F PT TOBACCO SCREEN RCVD TLK: CPT | Performed by: INTERNAL MEDICINE

## 2018-06-07 PROCEDURE — G8417 CALC BMI ABV UP PARAM F/U: HCPCS | Performed by: INTERNAL MEDICINE

## 2018-06-07 PROCEDURE — 99213 OFFICE O/P EST LOW 20 MIN: CPT | Performed by: INTERNAL MEDICINE

## 2018-06-07 PROCEDURE — 99211 OFF/OP EST MAY X REQ PHY/QHP: CPT | Performed by: SURGERY

## 2018-06-07 PROCEDURE — 3017F COLORECTAL CA SCREEN DOC REV: CPT | Performed by: SURGERY

## 2018-06-07 PROCEDURE — 3017F COLORECTAL CA SCREEN DOC REV: CPT | Performed by: INTERNAL MEDICINE

## 2018-06-07 PROCEDURE — G8417 CALC BMI ABV UP PARAM F/U: HCPCS | Performed by: SURGERY

## 2018-06-07 PROCEDURE — G8427 DOCREV CUR MEDS BY ELIG CLIN: HCPCS | Performed by: INTERNAL MEDICINE

## 2018-06-07 RX ORDER — ATENOLOL AND CHLORTHALIDONE TABLET 100; 25 MG/1; MG/1
TABLET ORAL
Refills: 3 | COMMUNITY
Start: 2018-03-31 | End: 2018-06-07

## 2018-06-07 RX ORDER — ATENOLOL 100 MG/1
100 TABLET ORAL DAILY
Qty: 30 TABLET | Refills: 3 | Status: SHIPPED | OUTPATIENT
Start: 2018-06-07 | End: 2018-10-12 | Stop reason: SDUPTHER

## 2018-06-07 RX ORDER — POTASSIUM CHLORIDE 750 MG/1
40 CAPSULE, EXTENDED RELEASE ORAL SEE ADMIN INSTRUCTIONS
Qty: 60 CAPSULE | Refills: 5 | Status: SHIPPED
Start: 2018-06-07 | End: 2018-06-20

## 2018-06-07 NOTE — PROGRESS NOTES
Please ask him to take K-dur 40 meq po one dose NOW  And then repeat in 3 hours.    I am seeing him later today

## 2018-06-11 ENCOUNTER — HOSPITAL ENCOUNTER (OUTPATIENT)
Age: 58
Discharge: HOME | End: 2018-06-11
Payer: MEDICARE

## 2018-06-11 DIAGNOSIS — E87.6 HYPOKALEMIA: ICD-10-CM

## 2018-06-11 DIAGNOSIS — I10 ESSENTIAL HYPERTENSION: ICD-10-CM

## 2018-06-11 LAB
MAGNESIUM: 2.3 MG/DL (ref 1.6–2.4)
POTASSIUM SERPL-SCNC: 2.9 MEQ/L (ref 3.5–5.2)

## 2018-06-11 PROCEDURE — 84132 ASSAY OF SERUM POTASSIUM: CPT

## 2018-06-11 PROCEDURE — 83735 ASSAY OF MAGNESIUM: CPT

## 2018-06-11 PROCEDURE — 36415 COLL VENOUS BLD VENIPUNCTURE: CPT

## 2018-06-11 NOTE — PROGRESS NOTES
Spoke with patient   He says he still has diarrhea up until this morning  Says it has slowed down  I advised him to see his PCP for diarrhea  He says he has appt this week    K 2.9  Advised to take K-dur 40 meq po TID for 2 days and then continue with K-dur 40 meq po daily  He has a lot of K-dur 10 meq tablets at home and he says he does not need a refill    Candida, Check K on Wednesday

## 2018-06-12 ENCOUNTER — TELEPHONE (OUTPATIENT)
Dept: NEPHROLOGY | Age: 58
End: 2018-06-12

## 2018-06-12 DIAGNOSIS — E87.6 HYPOKALEMIA: Primary | ICD-10-CM

## 2018-06-14 ENCOUNTER — HOSPITAL ENCOUNTER (OUTPATIENT)
Age: 58
Discharge: HOME | End: 2018-06-14
Payer: MEDICARE

## 2018-06-14 DIAGNOSIS — E87.6 HYPOKALEMIA: ICD-10-CM

## 2018-06-14 LAB — POTASSIUM SERPL-SCNC: 3.7 MEQ/L (ref 3.5–5.2)

## 2018-06-14 PROCEDURE — 84132 ASSAY OF SERUM POTASSIUM: CPT

## 2018-06-14 PROCEDURE — 36415 COLL VENOUS BLD VENIPUNCTURE: CPT

## 2018-06-15 ENCOUNTER — TELEPHONE (OUTPATIENT)
Dept: NEPHROLOGY | Age: 58
End: 2018-06-15

## 2018-06-15 DIAGNOSIS — E87.6 HYPOKALEMIA: Primary | ICD-10-CM

## 2018-06-19 ENCOUNTER — HOSPITAL ENCOUNTER (OUTPATIENT)
Age: 58
Discharge: HOME | End: 2018-06-19
Payer: MEDICARE

## 2018-06-19 DIAGNOSIS — E87.6 HYPOKALEMIA: ICD-10-CM

## 2018-06-19 LAB — POTASSIUM SERPL-SCNC: 3.8 MEQ/L (ref 3.5–5.2)

## 2018-06-19 PROCEDURE — 36415 COLL VENOUS BLD VENIPUNCTURE: CPT

## 2018-06-19 PROCEDURE — 84132 ASSAY OF SERUM POTASSIUM: CPT

## 2018-06-20 ENCOUNTER — OFFICE VISIT (OUTPATIENT)
Dept: NEPHROLOGY | Age: 58
End: 2018-06-20
Payer: MEDICARE

## 2018-06-20 VITALS
HEART RATE: 55 BPM | WEIGHT: 315 LBS | BODY MASS INDEX: 41.74 KG/M2 | DIASTOLIC BLOOD PRESSURE: 89 MMHG | OXYGEN SATURATION: 97 % | SYSTOLIC BLOOD PRESSURE: 153 MMHG

## 2018-06-20 DIAGNOSIS — I10 ESSENTIAL HYPERTENSION: ICD-10-CM

## 2018-06-20 DIAGNOSIS — E87.6 HYPOKALEMIA: Primary | ICD-10-CM

## 2018-06-20 PROCEDURE — G8428 CUR MEDS NOT DOCUMENT: HCPCS | Performed by: INTERNAL MEDICINE

## 2018-06-20 PROCEDURE — G8417 CALC BMI ABV UP PARAM F/U: HCPCS | Performed by: INTERNAL MEDICINE

## 2018-06-20 PROCEDURE — 4004F PT TOBACCO SCREEN RCVD TLK: CPT | Performed by: INTERNAL MEDICINE

## 2018-06-20 PROCEDURE — 99213 OFFICE O/P EST LOW 20 MIN: CPT | Performed by: INTERNAL MEDICINE

## 2018-06-20 PROCEDURE — 3017F COLORECTAL CA SCREEN DOC REV: CPT | Performed by: INTERNAL MEDICINE

## 2018-06-20 RX ORDER — POTASSIUM CHLORIDE 750 MG/1
40 TABLET, EXTENDED RELEASE ORAL 2 TIMES DAILY
Qty: 60 TABLET | Refills: 3
Start: 2018-06-20 | End: 2018-07-19 | Stop reason: SDUPTHER

## 2018-06-27 ENCOUNTER — HOSPITAL ENCOUNTER (OUTPATIENT)
Age: 58
Discharge: HOME | End: 2018-06-27
Payer: MEDICARE

## 2018-06-27 DIAGNOSIS — E87.6 HYPOKALEMIA: ICD-10-CM

## 2018-06-27 LAB
ALBUMIN SERPL-MCNC: 4 G/DL (ref 3.5–5.1)
ALP BLD-CCNC: 72 U/L (ref 38–126)
ALT SERPL-CCNC: 17 U/L (ref 11–66)
ANION GAP SERPL CALCULATED.3IONS-SCNC: 12 MEQ/L (ref 8–16)
AST SERPL-CCNC: 18 U/L (ref 5–40)
AVERAGE GLUCOSE: 117 MG/DL (ref 70–126)
BASOPHILS # BLD: 1.2 %
BASOPHILS ABSOLUTE: 0.1 THOU/MM3 (ref 0–0.1)
BILIRUB SERPL-MCNC: 0.3 MG/DL (ref 0.3–1.2)
BUN BLDV-MCNC: 8 MG/DL (ref 7–22)
CALCIUM SERPL-MCNC: 9.6 MG/DL (ref 8.5–10.5)
CHLORIDE BLD-SCNC: 103 MEQ/L (ref 98–111)
CHOLESTEROL, TOTAL: 124 MG/DL (ref 100–199)
CO2: 28 MEQ/L (ref 23–33)
CREAT SERPL-MCNC: 0.9 MG/DL (ref 0.4–1.2)
EOSINOPHIL # BLD: 6.3 %
EOSINOPHILS ABSOLUTE: 0.4 THOU/MM3 (ref 0–0.4)
ERYTHROCYTE [DISTWIDTH] IN BLOOD BY AUTOMATED COUNT: 16.3 % (ref 11.5–14.5)
ERYTHROCYTE [DISTWIDTH] IN BLOOD BY AUTOMATED COUNT: 49.5 FL (ref 35–45)
GFR SERPL CREATININE-BSD FRML MDRD: 87 ML/MIN/1.73M2
GLUCOSE BLD-MCNC: 109 MG/DL (ref 70–108)
HBA1C MFR BLD: 5.9 % (ref 4.4–6.4)
HCT VFR BLD CALC: 40.5 % (ref 42–52)
HDLC SERPL-MCNC: 34 MG/DL
HEMOGLOBIN: 12.7 GM/DL (ref 14–18)
IMMATURE GRANS (ABS): 0.02 THOU/MM3 (ref 0–0.07)
IMMATURE GRANULOCYTES: 0.3 %
LDL CHOLESTEROL CALCULATED: 71 MG/DL
LYMPHOCYTES # BLD: 20.7 %
LYMPHOCYTES ABSOLUTE: 1.2 THOU/MM3 (ref 1–4.8)
MAGNESIUM: 2.4 MG/DL (ref 1.6–2.4)
MCH RBC QN AUTO: 26.2 PG (ref 26–33)
MCHC RBC AUTO-ENTMCNC: 31.4 GM/DL (ref 32.2–35.5)
MCV RBC AUTO: 83.5 FL (ref 80–94)
MONOCYTES # BLD: 12.1 %
MONOCYTES ABSOLUTE: 0.7 THOU/MM3 (ref 0.4–1.3)
NUCLEATED RED BLOOD CELLS: 0 /100 WBC
PLATELET # BLD: 277 THOU/MM3 (ref 130–400)
PMV BLD AUTO: 10.4 FL (ref 9.4–12.4)
POTASSIUM SERPL-SCNC: 4.3 MEQ/L (ref 3.5–5.2)
RBC # BLD: 4.85 MILL/MM3 (ref 4.7–6.1)
SEG NEUTROPHILS: 59.4 %
SEGMENTED NEUTROPHILS ABSOLUTE COUNT: 3.5 THOU/MM3 (ref 1.8–7.7)
SODIUM BLD-SCNC: 143 MEQ/L (ref 135–145)
T4 FREE: 1.25 NG/DL (ref 0.93–1.76)
TOTAL PROTEIN: 7.5 G/DL (ref 6.1–8)
TRIGL SERPL-MCNC: 95 MG/DL (ref 0–199)
TSH SERPL DL<=0.05 MIU/L-ACNC: 3.78 UIU/ML (ref 0.4–4.2)
WBC # BLD: 5.9 THOU/MM3 (ref 4.8–10.8)

## 2018-06-27 PROCEDURE — 36415 COLL VENOUS BLD VENIPUNCTURE: CPT

## 2018-06-27 PROCEDURE — 80053 COMPREHEN METABOLIC PANEL: CPT

## 2018-06-27 PROCEDURE — 80061 LIPID PANEL: CPT

## 2018-06-27 PROCEDURE — 84439 ASSAY OF FREE THYROXINE: CPT

## 2018-06-27 PROCEDURE — 85025 COMPLETE CBC W/AUTO DIFF WBC: CPT

## 2018-06-27 PROCEDURE — 83735 ASSAY OF MAGNESIUM: CPT

## 2018-06-27 PROCEDURE — 82088 ASSAY OF ALDOSTERONE: CPT

## 2018-06-27 PROCEDURE — 83036 HEMOGLOBIN GLYCOSYLATED A1C: CPT

## 2018-06-27 PROCEDURE — 84443 ASSAY THYROID STIM HORMONE: CPT

## 2018-06-27 PROCEDURE — 84244 ASSAY OF RENIN: CPT

## 2018-06-28 ENCOUNTER — TELEPHONE (OUTPATIENT)
Dept: NEPHROLOGY | Age: 58
End: 2018-06-28

## 2018-06-28 DIAGNOSIS — E87.6 HYPOKALEMIA: Primary | ICD-10-CM

## 2018-06-28 LAB — ALDOSTERONE: 31.9 NG/DL

## 2018-06-29 LAB — RENIN ACTIVITY: 0.5 NG/ML/HR

## 2018-07-17 ENCOUNTER — HOSPITAL ENCOUNTER (OUTPATIENT)
Age: 58
Discharge: HOME | End: 2018-07-17
Payer: MEDICARE

## 2018-07-17 DIAGNOSIS — E87.6 HYPOKALEMIA: ICD-10-CM

## 2018-07-17 LAB — POTASSIUM SERPL-SCNC: 3.3 MEQ/L (ref 3.5–5.2)

## 2018-07-17 PROCEDURE — 84132 ASSAY OF SERUM POTASSIUM: CPT

## 2018-07-17 PROCEDURE — 36415 COLL VENOUS BLD VENIPUNCTURE: CPT

## 2018-07-18 ENCOUNTER — TELEPHONE (OUTPATIENT)
Dept: NEPHROLOGY | Age: 58
End: 2018-07-18

## 2018-07-18 DIAGNOSIS — E87.6 HYPOKALEMIA: ICD-10-CM

## 2018-07-19 RX ORDER — POTASSIUM CHLORIDE 20 MEQ/1
40 TABLET, EXTENDED RELEASE ORAL 2 TIMES DAILY
Qty: 120 TABLET | Refills: 6 | Status: SHIPPED | OUTPATIENT
Start: 2018-07-19 | End: 2018-09-20

## 2018-07-23 NOTE — PROGRESS NOTES
MCG/ACT inhaler Inhale 1 puff into the lungs every 6 hours as needed for Wheezing.  zolpidem (AMBIEN CR) 12.5 MG CR tablet Take 12.5 mg by mouth nightly as needed.  azelastine (ASTELIN) 137 MCG/SPRAY nasal spray 1 spray by Nasal route as needed. Use in each nostril as directed       No current facility-administered medications for this visit. Past Medical History  Ofelia Villagomez  has a past medical history of Arthritis; Back problem; Constipation; Hypertension; Sleep apnea; and Thyroid disease. Past Surgical History  The patient  has a past surgical history that includes Appendectomy; knee surgery (Right, 1980's); Carpal tunnel release (Bilateral); Colonoscopy (2017); hernia repair; and pr exploratory of abdomen (N/A, 2/5/2018). Family History  This patient's family history includes Cancer in his father and mother; Diabetes in his paternal grandmother; Heart Disease in his father; Stroke in his brother. Social History  Ofelia Villagomez  reports that he has never smoked. His smokeless tobacco use includes Snuff. He reports that he does not drink alcohol or use drugs. Subjective:     Review of Systems  No problems with ears, nose or throat. No problems with eyes. No chest pain, shortness of breath, abdominal pain, extremity pain or weakness, and no neurological deficits. No rashes.  symptoms per HPI. The remainder of the review of symptoms is negative. Objective:     PE:   Vitals:    07/24/18 0838   BP: 136/86   Weight: (!) 302 lb 9.6 oz (137.3 kg)   Height: 6' 1\" (1.854 m)       Constitutional: Alert and oriented times 3, no acute distress and cooperative to examination with appropriate mood and affect. HENT:   Head:        Normocephalic and atraumatic. Mouth/Throat:         Mucous membranes are normal.   Eyes:         EOM are normal. No scleral icterus. PERRLA.    Neck:        Supple, symmetrical, trachea midline  Pulmonary/Chest:      Chest symmetric with normal A/P diameter, Normal respiratory rate and rhthym. No use of accessory muscles. Abdominal:         Soft. No tenderness. Bowel sounds present. Musculoskeletal:         Normal range of motion. No edema or tenderness of lower extremities. Extremities: No cyanosis, clubbing, or edema present. Neurological:        Alert and oriented. No cranial nerve deficit. [facility]:        Normal mood and affect. Labs   Urine dip demonstrates   Results for POC orders placed in visit on 07/24/18   POCT Urinalysis No Micro (Auto)   Result Value Ref Range    Glucose, Ur Negative NEGATIVE mg/dl    Bilirubin Urine Negative     Ketones, Urine Negative NEGATIVE    Specific Gravity, Urine 1.020 1.002 - 1.03    Blood, UA POC Negative NEGATIVE    pH, Urine 7.00 5.0 - 9.0    Protein, Urine 100 (A) NEGATIVE mg/dl    Urobilinogen, Urine 0.20 0.0 - 1.0 eu/dl    Nitrite, Urine Negative NEGATIVE    Leukocyte Clumps, Urine Negative NEGATIVE    Color, Urine Yellow YELLOW-STR    Character, Urine Clear [id number]   poct post void residual   Result Value Ref Range    post void residual 188 ml       Patients recent PSA values are as follows  Lab Results   Component Value Date    PSA 0.94 12/07/2016        Recent BUN/Creatinine:  Lab Results   Component Value Date    BUN 8 06/27/2018    CREATININE 0.9 06/27/2018       Radiology  No recent imaging       Assessment & Plan:     BPH with LUTs    Ofelia Villagomez f/u today for BPH. Reports incomplete bladder emptying and nocturia 4-5 times nightly. Will plan to increase his Flomax to 0.8 mg, discussed to wean off Ditropan if able, potentially worsening his symptoms. PVR was 188 ml. Plan on obtaining PSA in 1 year prior to f/u     Return in about 1 year (around 7/24/2019) for BPH.     SAHIL Almonte  Urology

## 2018-07-24 ENCOUNTER — HOSPITAL ENCOUNTER (OUTPATIENT)
Age: 58
Discharge: HOME | End: 2018-07-24
Payer: MEDICARE

## 2018-07-24 ENCOUNTER — OFFICE VISIT (OUTPATIENT)
Dept: UROLOGY | Age: 58
End: 2018-07-24
Payer: MEDICARE

## 2018-07-24 VITALS
SYSTOLIC BLOOD PRESSURE: 136 MMHG | HEIGHT: 73 IN | BODY MASS INDEX: 40.11 KG/M2 | WEIGHT: 302.6 LBS | DIASTOLIC BLOOD PRESSURE: 86 MMHG

## 2018-07-24 DIAGNOSIS — N40.1 BENIGN PROSTATIC HYPERPLASIA WITH URINARY FREQUENCY: Primary | ICD-10-CM

## 2018-07-24 DIAGNOSIS — R35.0 BENIGN PROSTATIC HYPERPLASIA WITH URINARY FREQUENCY: Primary | ICD-10-CM

## 2018-07-24 DIAGNOSIS — E87.6 HYPOKALEMIA: ICD-10-CM

## 2018-07-24 LAB
BILIRUBIN URINE: NEGATIVE
BLOOD URINE, POC: NEGATIVE
CHARACTER, URINE: CLEAR
COLOR, URINE: YELLOW
GLUCOSE URINE: NEGATIVE MG/DL
KETONES, URINE: NEGATIVE
LEUKOCYTE CLUMPS, URINE: NEGATIVE
NITRITE, URINE: NEGATIVE
PH, URINE: 7
POST VOID RESIDUAL (PVR): 188 ML
POTASSIUM SERPL-SCNC: 3.5 MEQ/L (ref 3.5–5.2)
PROTEIN, URINE: 100 MG/DL
SPECIFIC GRAVITY, URINE: 1.02 (ref 1–1.03)
UROBILINOGEN, URINE: 0.2 EU/DL

## 2018-07-24 PROCEDURE — 3017F COLORECTAL CA SCREEN DOC REV: CPT | Performed by: NURSE PRACTITIONER

## 2018-07-24 PROCEDURE — G8427 DOCREV CUR MEDS BY ELIG CLIN: HCPCS | Performed by: NURSE PRACTITIONER

## 2018-07-24 PROCEDURE — G8417 CALC BMI ABV UP PARAM F/U: HCPCS | Performed by: NURSE PRACTITIONER

## 2018-07-24 PROCEDURE — 4004F PT TOBACCO SCREEN RCVD TLK: CPT | Performed by: NURSE PRACTITIONER

## 2018-07-24 PROCEDURE — 36415 COLL VENOUS BLD VENIPUNCTURE: CPT

## 2018-07-24 PROCEDURE — 51798 US URINE CAPACITY MEASURE: CPT | Performed by: NURSE PRACTITIONER

## 2018-07-24 PROCEDURE — 84132 ASSAY OF SERUM POTASSIUM: CPT

## 2018-07-24 PROCEDURE — 99213 OFFICE O/P EST LOW 20 MIN: CPT | Performed by: NURSE PRACTITIONER

## 2018-07-24 PROCEDURE — 81003 URINALYSIS AUTO W/O SCOPE: CPT | Performed by: NURSE PRACTITIONER

## 2018-07-24 RX ORDER — TAMSULOSIN HYDROCHLORIDE 0.4 MG/1
0.8 CAPSULE ORAL NIGHTLY
Qty: 90 CAPSULE | Refills: 3 | Status: SHIPPED | OUTPATIENT
Start: 2018-07-24 | End: 2018-10-12 | Stop reason: SDUPTHER

## 2018-07-25 ENCOUNTER — TELEPHONE (OUTPATIENT)
Dept: NEPHROLOGY | Age: 58
End: 2018-07-25

## 2018-07-25 DIAGNOSIS — E87.6 HYPOKALEMIA: Primary | ICD-10-CM

## 2018-07-30 ENCOUNTER — TELEPHONE (OUTPATIENT)
Dept: NEPHROLOGY | Age: 58
End: 2018-07-30

## 2018-07-30 NOTE — TELEPHONE ENCOUNTER
LM regarding Dr Grimaldo Innocent dirctives  Patient asked to call back to confirm the message and set up appointment if he chooses to

## 2018-07-30 NOTE — TELEPHONE ENCOUNTER
See PCP  He had nausea and vomiting and diarrhea for long time which is why his K was dropping  Otherwise see me in office

## 2018-08-28 NOTE — OP NOTE
finally basically got to the  distal ileum and we followed the small bowel back several times, lysing  adhesions as we went all way back to the ligament of Treitz. We  essentially freed up all the small bowel and then we went back to address  the ascending colon. Again my concern was because of the anatomy and the  fact that we had already made two openings in the ascending colon, I  elected to go ahead and remove the ascending colon and the transverse colon  over to the splenic flexure due to the patient's significant constipation  and what I found at the time of surgery was very dilated, almost atonic  transverse colon. The MARYCARMEN was fired across the distal ileum. The  mesentery of the right colon was taken down. We followed taking the  mesentery of the transverse colon down, also  the gastrocolic  omentum. We got over to just past the splenic flexure, fired a MARYCARMEN and  removed the specimen. A side-to-side anastomosis of the distal ileum to  the descending colon was performed using the MARYCARMEN with the opening closed  with TA-60 stapler and then we reinforced the suture line with interrupted  3-0 Vicryl sutures. Previous to this, we had even irrigated a couple  liters of fluid where there had been stool in the right upper quadrant, but  then we irrigated with another 3 liters of fluid. We had dissected a  little bit down into the pelvis, the sigmoid but there were lot of  adhesions down there also. At this point in time, I placed a 15 Berto  drain bringing it in the right lower quadrant up along the right gutter  that is where the stool spillage had been, but also winding it around to  lie on top of the stomach with its tip near the anastomosis on the left  upper quadrant. We ran the bowel again to make sure there was no evidence  of any injuries that we could see. The suture line of the small bowel  enterotomies appeared good.   At this point in time, we closed the abdominal  wall, which was
no

## 2018-09-18 ENCOUNTER — HOSPITAL ENCOUNTER (OUTPATIENT)
Age: 58
Discharge: HOME | End: 2018-09-18
Payer: MEDICARE

## 2018-09-18 LAB — POTASSIUM SERPL-SCNC: 3.3 MEQ/L (ref 3.5–5.2)

## 2018-09-18 PROCEDURE — 36415 COLL VENOUS BLD VENIPUNCTURE: CPT

## 2018-09-18 PROCEDURE — 84132 ASSAY OF SERUM POTASSIUM: CPT

## 2018-09-20 ENCOUNTER — OFFICE VISIT (OUTPATIENT)
Dept: NEPHROLOGY | Age: 58
End: 2018-09-20
Payer: MEDICARE

## 2018-09-20 VITALS
OXYGEN SATURATION: 96 % | DIASTOLIC BLOOD PRESSURE: 86 MMHG | SYSTOLIC BLOOD PRESSURE: 159 MMHG | WEIGHT: 315 LBS | BODY MASS INDEX: 44.01 KG/M2 | HEART RATE: 60 BPM

## 2018-09-20 DIAGNOSIS — I10 ESSENTIAL HYPERTENSION: ICD-10-CM

## 2018-09-20 DIAGNOSIS — E87.6 HYPOKALEMIA: Primary | ICD-10-CM

## 2018-09-20 PROCEDURE — G8427 DOCREV CUR MEDS BY ELIG CLIN: HCPCS | Performed by: INTERNAL MEDICINE

## 2018-09-20 PROCEDURE — G8417 CALC BMI ABV UP PARAM F/U: HCPCS | Performed by: INTERNAL MEDICINE

## 2018-09-20 PROCEDURE — 3017F COLORECTAL CA SCREEN DOC REV: CPT | Performed by: INTERNAL MEDICINE

## 2018-09-20 PROCEDURE — 4004F PT TOBACCO SCREEN RCVD TLK: CPT | Performed by: INTERNAL MEDICINE

## 2018-09-20 PROCEDURE — 99213 OFFICE O/P EST LOW 20 MIN: CPT | Performed by: INTERNAL MEDICINE

## 2018-09-20 RX ORDER — POTASSIUM CHLORIDE 20 MEQ/1
40 TABLET, EXTENDED RELEASE ORAL 2 TIMES DAILY
Qty: 120 TABLET | Refills: 3 | Status: SHIPPED | OUTPATIENT
Start: 2018-09-20 | End: 2019-08-03 | Stop reason: SDUPTHER

## 2018-09-20 RX ORDER — SPIRONOLACTONE 25 MG/1
25 TABLET ORAL DAILY
Qty: 30 TABLET | Refills: 3 | Status: SHIPPED | OUTPATIENT
Start: 2018-09-20 | End: 2019-01-26 | Stop reason: SDUPTHER

## 2018-09-20 NOTE — PROGRESS NOTES
Mervat Ferrell states that he has had a headache for the last 3 days   Mervat Ferrell also has concerns regarding BP medications

## 2018-10-12 ENCOUNTER — TELEPHONE (OUTPATIENT)
Dept: UROLOGY | Age: 58
End: 2018-10-12

## 2018-10-12 RX ORDER — OXYBUTYNIN CHLORIDE 5 MG/1
5 TABLET ORAL 3 TIMES DAILY
Qty: 90 TABLET | Refills: 3 | Status: SHIPPED | OUTPATIENT
Start: 2018-10-12 | End: 2019-07-02

## 2018-10-12 RX ORDER — POTASSIUM CHLORIDE 20 MEQ/1
40 TABLET, EXTENDED RELEASE ORAL 2 TIMES DAILY
Qty: 120 TABLET | Refills: 3 | OUTPATIENT
Start: 2018-10-12

## 2018-10-12 RX ORDER — SPIRONOLACTONE 25 MG/1
25 TABLET ORAL DAILY
Qty: 30 TABLET | Refills: 3 | OUTPATIENT
Start: 2018-10-12

## 2018-10-12 RX ORDER — TAMSULOSIN HYDROCHLORIDE 0.4 MG/1
0.8 CAPSULE ORAL NIGHTLY
Qty: 180 CAPSULE | Refills: 3 | Status: SHIPPED | OUTPATIENT
Start: 2018-10-12 | End: 2019-07-02

## 2018-10-12 RX ORDER — ATENOLOL 100 MG/1
100 TABLET ORAL DAILY
Qty: 30 TABLET | Refills: 6 | Status: SHIPPED | OUTPATIENT
Start: 2018-10-12 | End: 2019-05-07 | Stop reason: SDUPTHER

## 2018-10-12 NOTE — TELEPHONE ENCOUNTER
Please approve or refuse Rx request:  Requested Prescriptions     Pending Prescriptions Disp Refills    atenolol (TENORMIN) 100 MG tablet 30 tablet 3     Sig: Take 1 tablet by mouth daily    spironolactone (ALDACTONE) 25 MG tablet 30 tablet 3     Sig: Take 1 tablet by mouth daily    potassium chloride (KLOR-CON M) 20 MEQ extended release tablet 120 tablet 3     Sig: Take 2 tablets by mouth 2 times daily       Next appointment:  12/13/2018  Aldactone and potassium were recently filled on 09.20.2018

## 2018-12-12 ENCOUNTER — HOSPITAL ENCOUNTER (OUTPATIENT)
Age: 58
Discharge: HOME | End: 2018-12-12
Payer: MEDICARE

## 2018-12-12 DIAGNOSIS — E87.6 HYPOKALEMIA: ICD-10-CM

## 2018-12-12 LAB
ANION GAP SERPL CALCULATED.3IONS-SCNC: 10 MEQ/L (ref 8–16)
BUN BLDV-MCNC: 12 MG/DL (ref 7–22)
CALCIUM SERPL-MCNC: 9.2 MG/DL (ref 8.5–10.5)
CHLORIDE BLD-SCNC: 99 MEQ/L (ref 98–111)
CO2: 29 MEQ/L (ref 23–33)
CREAT SERPL-MCNC: 1 MG/DL (ref 0.4–1.2)
GFR SERPL CREATININE-BSD FRML MDRD: 77 ML/MIN/1.73M2
GLUCOSE BLD-MCNC: 151 MG/DL (ref 70–108)
MAGNESIUM: 2 MG/DL (ref 1.6–2.4)
POTASSIUM SERPL-SCNC: 4.3 MEQ/L (ref 3.5–5.2)
SODIUM BLD-SCNC: 138 MEQ/L (ref 135–145)

## 2018-12-12 PROCEDURE — 36415 COLL VENOUS BLD VENIPUNCTURE: CPT

## 2018-12-12 PROCEDURE — 83735 ASSAY OF MAGNESIUM: CPT

## 2018-12-12 PROCEDURE — 80048 BASIC METABOLIC PNL TOTAL CA: CPT

## 2018-12-13 ENCOUNTER — OFFICE VISIT (OUTPATIENT)
Dept: NEPHROLOGY | Age: 58
End: 2018-12-13
Payer: MEDICARE

## 2018-12-13 VITALS
BODY MASS INDEX: 44.83 KG/M2 | SYSTOLIC BLOOD PRESSURE: 152 MMHG | WEIGHT: 315 LBS | DIASTOLIC BLOOD PRESSURE: 80 MMHG | OXYGEN SATURATION: 98 % | HEART RATE: 72 BPM

## 2018-12-13 DIAGNOSIS — E87.6 HYPOKALEMIA: Primary | ICD-10-CM

## 2018-12-13 DIAGNOSIS — I10 ESSENTIAL HYPERTENSION: ICD-10-CM

## 2018-12-13 PROCEDURE — 4004F PT TOBACCO SCREEN RCVD TLK: CPT | Performed by: INTERNAL MEDICINE

## 2018-12-13 PROCEDURE — G8427 DOCREV CUR MEDS BY ELIG CLIN: HCPCS | Performed by: INTERNAL MEDICINE

## 2018-12-13 PROCEDURE — G8484 FLU IMMUNIZE NO ADMIN: HCPCS | Performed by: INTERNAL MEDICINE

## 2018-12-13 PROCEDURE — G8417 CALC BMI ABV UP PARAM F/U: HCPCS | Performed by: INTERNAL MEDICINE

## 2018-12-13 PROCEDURE — 99213 OFFICE O/P EST LOW 20 MIN: CPT | Performed by: INTERNAL MEDICINE

## 2018-12-13 PROCEDURE — 3017F COLORECTAL CA SCREEN DOC REV: CPT | Performed by: INTERNAL MEDICINE

## 2018-12-13 NOTE — PROGRESS NOTES
HFA) 108 (90 BASE) MCG/ACT inhaler Inhale 1 puff into the lungs every 6 hours as needed for Wheezing.  zolpidem (AMBIEN CR) 12.5 MG CR tablet Take 12.5 mg by mouth nightly as needed.  azelastine (ASTELIN) 137 MCG/SPRAY nasal spray 1 spray by Nasal route as needed. Use in each nostril as directed       No current facility-administered medications for this visit. Laboratory & Diagnostics:  Feb 2018: R 14.3, L 15.4, Two simple renal cysts  K 4.5, Creat 1.3   June 2018: K 2.8, creat 1.0, Aldosterone 31, Renin 0.5  Sept 2018: K 3.3  Dec 2018: K 4.3, Creat 1.0, Mg 2.0     Impression/Plan:   1. Hypokalemia: secondary to GI losses. At this time, it is within range with supplementation. He is taking K-dur 40 meq po BID. He is aware that he needs to call us if any significant change in bowel pattern. 2. HTN: BP in 140-150 range. Need to loose weight. He uses CPAP machine. He was advised low salt diet. Continue with current medications. He is on aldactone, clonidine, and norvasc and atenolol  3. B/L renal simple cysts  4. Chronic diarrhea: following with GI  5. Hx SBO s/p JENNY    Orders Placed This Encounter   Procedures    Basic Metabolic Panel    Potassium    Magnesium    Protein / creatinine ratio, urine     Return in about 6 months (around 6/13/2019).     Hattie Maxwell MD  [facility]

## 2019-01-14 ENCOUNTER — HOSPITAL ENCOUNTER (OUTPATIENT)
Age: 59
Discharge: HOME | End: 2019-01-14
Payer: MEDICARE

## 2019-01-14 DIAGNOSIS — E87.6 HYPOKALEMIA: ICD-10-CM

## 2019-01-14 LAB — POTASSIUM SERPL-SCNC: 4.7 MEQ/L (ref 3.5–5.2)

## 2019-01-14 PROCEDURE — 36415 COLL VENOUS BLD VENIPUNCTURE: CPT

## 2019-01-14 PROCEDURE — 84132 ASSAY OF SERUM POTASSIUM: CPT

## 2019-01-15 ENCOUNTER — TELEPHONE (OUTPATIENT)
Dept: NEPHROLOGY | Age: 59
End: 2019-01-15

## 2019-01-15 DIAGNOSIS — E87.6 HYPOKALEMIA: Primary | ICD-10-CM

## 2019-01-28 ENCOUNTER — OFFICE VISIT (OUTPATIENT)
Dept: PULMONOLOGY | Age: 59
End: 2019-01-28
Payer: MEDICARE

## 2019-01-28 VITALS
RESPIRATION RATE: 18 BRPM | HEIGHT: 73 IN | DIASTOLIC BLOOD PRESSURE: 100 MMHG | HEART RATE: 89 BPM | OXYGEN SATURATION: 95 % | WEIGHT: 315 LBS | SYSTOLIC BLOOD PRESSURE: 170 MMHG | BODY MASS INDEX: 41.75 KG/M2

## 2019-01-28 DIAGNOSIS — G47.33 OBSTRUCTIVE SLEEP APNEA ON CPAP: Primary | ICD-10-CM

## 2019-01-28 DIAGNOSIS — K90.89 OTHER INTESTINAL MALABSORPTION: ICD-10-CM

## 2019-01-28 DIAGNOSIS — G25.81 RLS (RESTLESS LEGS SYNDROME): ICD-10-CM

## 2019-01-28 DIAGNOSIS — E66.01 OBESITY, CLASS III, BMI 40-49.9 (MORBID OBESITY) (HCC): ICD-10-CM

## 2019-01-28 DIAGNOSIS — F51.04 PSYCHOPHYSIOLOGICAL INSOMNIA: ICD-10-CM

## 2019-01-28 DIAGNOSIS — I10 ESSENTIAL HYPERTENSION: ICD-10-CM

## 2019-01-28 DIAGNOSIS — Z99.89 OBSTRUCTIVE SLEEP APNEA ON CPAP: Primary | ICD-10-CM

## 2019-01-28 PROCEDURE — 3017F COLORECTAL CA SCREEN DOC REV: CPT | Performed by: PHYSICIAN ASSISTANT

## 2019-01-28 PROCEDURE — 4004F PT TOBACCO SCREEN RCVD TLK: CPT | Performed by: PHYSICIAN ASSISTANT

## 2019-01-28 PROCEDURE — G8427 DOCREV CUR MEDS BY ELIG CLIN: HCPCS | Performed by: PHYSICIAN ASSISTANT

## 2019-01-28 PROCEDURE — 99214 OFFICE O/P EST MOD 30 MIN: CPT | Performed by: PHYSICIAN ASSISTANT

## 2019-01-28 PROCEDURE — G8417 CALC BMI ABV UP PARAM F/U: HCPCS | Performed by: PHYSICIAN ASSISTANT

## 2019-01-28 PROCEDURE — G8484 FLU IMMUNIZE NO ADMIN: HCPCS | Performed by: PHYSICIAN ASSISTANT

## 2019-01-28 RX ORDER — ROPINIROLE 0.25 MG/1
0.25 TABLET, FILM COATED ORAL NIGHTLY
Qty: 90 TABLET | Refills: 3 | Status: SHIPPED | OUTPATIENT
Start: 2019-01-28 | End: 2019-04-30 | Stop reason: SDUPTHER

## 2019-01-28 RX ORDER — SPIRONOLACTONE 25 MG/1
TABLET ORAL
Qty: 90 TABLET | Refills: 3 | Status: SHIPPED | OUTPATIENT
Start: 2019-01-28 | End: 2019-04-09 | Stop reason: ALTCHOICE

## 2019-01-28 ASSESSMENT — ENCOUNTER SYMPTOMS
CHEST TIGHTNESS: 0
COUGH: 0
WHEEZING: 0
NAUSEA: 0
BACK PAIN: 0
SHORTNESS OF BREATH: 0
DIARRHEA: 1
EYES NEGATIVE: 1
STRIDOR: 0
ALLERGIC/IMMUNOLOGIC NEGATIVE: 1

## 2019-03-15 ENCOUNTER — HOSPITAL ENCOUNTER (OUTPATIENT)
Age: 59
Discharge: HOME | End: 2019-03-15
Payer: MEDICARE

## 2019-03-15 DIAGNOSIS — E87.6 HYPOKALEMIA: ICD-10-CM

## 2019-03-15 LAB — POTASSIUM SERPL-SCNC: 4.2 MEQ/L (ref 3.5–5.2)

## 2019-03-15 PROCEDURE — 84132 ASSAY OF SERUM POTASSIUM: CPT

## 2019-03-15 PROCEDURE — 36415 COLL VENOUS BLD VENIPUNCTURE: CPT

## 2019-04-08 ENCOUNTER — APPOINTMENT (OUTPATIENT)
Dept: GENERAL RADIOLOGY | Age: 59
DRG: 305 | End: 2019-04-08
Payer: MEDICARE

## 2019-04-08 ENCOUNTER — HOSPITAL ENCOUNTER (EMERGENCY)
Age: 59
Discharge: LEFT BEFORE DISCHARGE | DRG: 305 | End: 2019-04-08
Payer: MEDICARE

## 2019-04-08 ENCOUNTER — APPOINTMENT (OUTPATIENT)
Dept: CT IMAGING | Age: 59
DRG: 305 | End: 2019-04-08
Payer: MEDICARE

## 2019-04-08 VITALS
HEART RATE: 67 BPM | SYSTOLIC BLOOD PRESSURE: 174 MMHG | BODY MASS INDEX: 41.75 KG/M2 | RESPIRATION RATE: 16 BRPM | OXYGEN SATURATION: 94 % | DIASTOLIC BLOOD PRESSURE: 97 MMHG | WEIGHT: 315 LBS | HEIGHT: 73 IN | TEMPERATURE: 98 F

## 2019-04-08 DIAGNOSIS — I16.0 HYPERTENSIVE URGENCY: Primary | ICD-10-CM

## 2019-04-08 DIAGNOSIS — R51.9 ACUTE NONINTRACTABLE HEADACHE, UNSPECIFIED HEADACHE TYPE: ICD-10-CM

## 2019-04-08 DIAGNOSIS — R06.09 DOE (DYSPNEA ON EXERTION): ICD-10-CM

## 2019-04-08 LAB
ALBUMIN SERPL-MCNC: 4.2 G/DL (ref 3.5–5.1)
ALP BLD-CCNC: 87 U/L (ref 38–126)
ALT SERPL-CCNC: 33 U/L (ref 11–66)
ANION GAP SERPL CALCULATED.3IONS-SCNC: 13 MEQ/L (ref 8–16)
AST SERPL-CCNC: 28 U/L (ref 5–40)
BASOPHILS # BLD: 0.7 %
BASOPHILS ABSOLUTE: 0.1 THOU/MM3 (ref 0–0.1)
BILIRUB SERPL-MCNC: 0.6 MG/DL (ref 0.3–1.2)
BUN BLDV-MCNC: 12 MG/DL (ref 7–22)
CALCIUM SERPL-MCNC: 9.5 MG/DL (ref 8.5–10.5)
CHLORIDE BLD-SCNC: 103 MEQ/L (ref 98–111)
CO2: 27 MEQ/L (ref 23–33)
CREAT SERPL-MCNC: 0.9 MG/DL (ref 0.4–1.2)
EKG ATRIAL RATE: 67 BPM
EKG P AXIS: 40 DEGREES
EKG P-R INTERVAL: 188 MS
EKG Q-T INTERVAL: 446 MS
EKG QRS DURATION: 104 MS
EKG QTC CALCULATION (BAZETT): 471 MS
EKG R AXIS: -30 DEGREES
EKG T AXIS: 2 DEGREES
EKG VENTRICULAR RATE: 67 BPM
EOSINOPHIL # BLD: 2.6 %
EOSINOPHILS ABSOLUTE: 0.2 THOU/MM3 (ref 0–0.4)
ERYTHROCYTE [DISTWIDTH] IN BLOOD BY AUTOMATED COUNT: 14.5 % (ref 11.5–14.5)
ERYTHROCYTE [DISTWIDTH] IN BLOOD BY AUTOMATED COUNT: 44.2 FL (ref 35–45)
GFR SERPL CREATININE-BSD FRML MDRD: 87 ML/MIN/1.73M2
GLUCOSE BLD-MCNC: 140 MG/DL (ref 70–108)
HCT VFR BLD CALC: 47.1 % (ref 42–52)
HEMOGLOBIN: 15.7 GM/DL (ref 14–18)
IMMATURE GRANS (ABS): 0.04 THOU/MM3 (ref 0–0.07)
IMMATURE GRANULOCYTES: 0.5 %
LYMPHOCYTES # BLD: 11.5 %
LYMPHOCYTES ABSOLUTE: 1 THOU/MM3 (ref 1–4.8)
MAGNESIUM: 1.9 MG/DL (ref 1.6–2.4)
MCH RBC QN AUTO: 28.5 PG (ref 26–33)
MCHC RBC AUTO-ENTMCNC: 33.3 GM/DL (ref 32.2–35.5)
MCV RBC AUTO: 85.5 FL (ref 80–94)
MONOCYTES # BLD: 9.5 %
MONOCYTES ABSOLUTE: 0.8 THOU/MM3 (ref 0.4–1.3)
NUCLEATED RED BLOOD CELLS: 0 /100 WBC
OSMOLALITY CALCULATION: 287 MOSMOL/KG (ref 275–300)
PLATELET # BLD: 289 THOU/MM3 (ref 130–400)
PMV BLD AUTO: 9.5 FL (ref 9.4–12.4)
POTASSIUM SERPL-SCNC: 3.7 MEQ/L (ref 3.5–5.2)
PRO-BNP: 943.5 PG/ML (ref 0–900)
RBC # BLD: 5.51 MILL/MM3 (ref 4.7–6.1)
SEG NEUTROPHILS: 75.2 %
SEGMENTED NEUTROPHILS ABSOLUTE COUNT: 6.7 THOU/MM3 (ref 1.8–7.7)
SODIUM BLD-SCNC: 143 MEQ/L (ref 135–145)
TOTAL PROTEIN: 8 G/DL (ref 6.1–8)
TROPONIN T: < 0.01 NG/ML
TSH SERPL DL<=0.05 MIU/L-ACNC: 4.12 UIU/ML (ref 0.4–4.2)
WBC # BLD: 8.9 THOU/MM3 (ref 4.8–10.8)

## 2019-04-08 PROCEDURE — 93010 ELECTROCARDIOGRAM REPORT: CPT | Performed by: NUCLEAR MEDICINE

## 2019-04-08 PROCEDURE — 83880 ASSAY OF NATRIURETIC PEPTIDE: CPT

## 2019-04-08 PROCEDURE — 6360000002 HC RX W HCPCS: Performed by: PHYSICIAN ASSISTANT

## 2019-04-08 PROCEDURE — 71046 X-RAY EXAM CHEST 2 VIEWS: CPT

## 2019-04-08 PROCEDURE — 84443 ASSAY THYROID STIM HORMONE: CPT

## 2019-04-08 PROCEDURE — 70450 CT HEAD/BRAIN W/O DYE: CPT

## 2019-04-08 PROCEDURE — 99285 EMERGENCY DEPT VISIT HI MDM: CPT

## 2019-04-08 PROCEDURE — 2580000003 HC RX 258: Performed by: PHYSICIAN ASSISTANT

## 2019-04-08 PROCEDURE — 93005 ELECTROCARDIOGRAM TRACING: CPT | Performed by: PHYSICIAN ASSISTANT

## 2019-04-08 PROCEDURE — 96375 TX/PRO/DX INJ NEW DRUG ADDON: CPT

## 2019-04-08 PROCEDURE — 84484 ASSAY OF TROPONIN QUANT: CPT

## 2019-04-08 PROCEDURE — 36415 COLL VENOUS BLD VENIPUNCTURE: CPT

## 2019-04-08 PROCEDURE — 80053 COMPREHEN METABOLIC PANEL: CPT

## 2019-04-08 PROCEDURE — 83735 ASSAY OF MAGNESIUM: CPT

## 2019-04-08 PROCEDURE — 85025 COMPLETE CBC W/AUTO DIFF WBC: CPT

## 2019-04-08 PROCEDURE — 96374 THER/PROPH/DIAG INJ IV PUSH: CPT

## 2019-04-08 RX ORDER — 0.9 % SODIUM CHLORIDE 0.9 %
1000 INTRAVENOUS SOLUTION INTRAVENOUS ONCE
Status: COMPLETED | OUTPATIENT
Start: 2019-04-08 | End: 2019-04-08

## 2019-04-08 RX ORDER — KETOROLAC TROMETHAMINE 30 MG/ML
30 INJECTION, SOLUTION INTRAMUSCULAR; INTRAVENOUS ONCE
Status: COMPLETED | OUTPATIENT
Start: 2019-04-08 | End: 2019-04-08

## 2019-04-08 RX ORDER — DIPHENHYDRAMINE HYDROCHLORIDE 50 MG/ML
25 INJECTION INTRAMUSCULAR; INTRAVENOUS ONCE
Status: COMPLETED | OUTPATIENT
Start: 2019-04-08 | End: 2019-04-08

## 2019-04-08 RX ADMIN — SODIUM CHLORIDE 1000 ML: 9 INJECTION, SOLUTION INTRAVENOUS at 19:05

## 2019-04-08 RX ADMIN — KETOROLAC TROMETHAMINE 30 MG: 30 INJECTION, SOLUTION INTRAMUSCULAR; INTRAVENOUS at 19:02

## 2019-04-08 RX ADMIN — PROCHLORPERAZINE EDISYLATE 10 MG: 5 INJECTION INTRAMUSCULAR; INTRAVENOUS at 18:58

## 2019-04-08 RX ADMIN — DIPHENHYDRAMINE HYDROCHLORIDE 25 MG: 50 INJECTION, SOLUTION INTRAMUSCULAR; INTRAVENOUS at 19:04

## 2019-04-08 ASSESSMENT — PAIN SCALES - GENERAL
PAINLEVEL_OUTOF10: 8
PAINLEVEL_OUTOF10: 8
PAINLEVEL_OUTOF10: 3

## 2019-04-08 ASSESSMENT — ENCOUNTER SYMPTOMS
RHINORRHEA: 0
ABDOMINAL PAIN: 0
SORE THROAT: 0
COUGH: 0
EYE REDNESS: 0
VOMITING: 0
DIARRHEA: 0
NAUSEA: 0
SHORTNESS OF BREATH: 1
WHEEZING: 0
PHOTOPHOBIA: 1
BACK PAIN: 0
EYE DISCHARGE: 0

## 2019-04-08 ASSESSMENT — PAIN DESCRIPTION - PAIN TYPE: TYPE: ACUTE PAIN

## 2019-04-08 ASSESSMENT — PAIN DESCRIPTION - DESCRIPTORS: DESCRIPTORS: ACHING

## 2019-04-08 ASSESSMENT — PAIN DESCRIPTION - FREQUENCY: FREQUENCY: CONTINUOUS

## 2019-04-08 ASSESSMENT — PAIN DESCRIPTION - LOCATION: LOCATION: HEAD

## 2019-04-08 NOTE — ED PROVIDER NOTES
[facility] EMERGENCY DEPT      CHIEF COMPLAINT       Chief Complaint   Patient presents with    Hypertension    Headache       Nurses Notes reviewed and I agree except as noted in the HPI. HISTORY OF PRESENT ILLNESS    Elyssa Cole is a 66 y.o. male who presents for a headache onset this morning at 0900. The patient reports the headache gradually onset and has been getting progressively worse since. He reports blurry vision and photophobia. The patient states that the pain is a constant pounding and throbbing sensation. He states that the back of his neck is hurting secondary to the headache. He has taken pain medication for the pain but it did not help. The patient also reports that he is hypertensive, stating that his blood pressure at home was 185/114. He is currently taking blood pressure medication and is compliant with them. He denies abdominal pain, urinary problems, nausea, vomiting, or chest pain. Patient's wife states that he has not been eating as normal. The patient also reports shortness of breath for the last 2 weeks. He denies a history of migraines or COPD. Denies any recent head injury. There are no other complaints at this time. Location/Symptom: headahce  Timing/Onset: this morning  Context/Setting: gradual onset, no history of migraines  Quality: pounding, throbbing  Duration: constant  Modifying Factors: light  Severity: n/a    REVIEW OF SYSTEMS     Review of Systems   Constitutional: Positive for appetite change (decrease). Negative for chills, fatigue and fever. HENT: Negative for congestion, ear pain, rhinorrhea and sore throat. Eyes: Positive for photophobia and visual disturbance. Negative for discharge and redness. Respiratory: Positive for shortness of breath. Negative for cough and wheezing. Cardiovascular: Negative for chest pain, palpitations and leg swelling. Hypertension   Gastrointestinal: Negative for abdominal pain, diarrhea, nausea and vomiting. Genitourinary: Negative for decreased urine volume, difficulty urinating, dysuria and hematuria. Musculoskeletal: Negative for arthralgias, back pain, joint swelling and neck pain. Skin: Negative for pallor and rash. Allergic/Immunologic: Negative for environmental allergies. Neurological: Positive for headaches. Negative for dizziness, syncope, weakness, light-headedness and numbness. Hematological: Negative for adenopathy. Psychiatric/Behavioral: Negative for confusion and suicidal ideas. The patient is not nervous/anxious. PAST MEDICAL HISTORY    has a past medical history of Arthritis, Back problem, Constipation, Hypertension, Sleep apnea, and Thyroid disease. SURGICAL HISTORY      has a past surgical history that includes Appendectomy; knee surgery (Right, 1980's); Carpal tunnel release (Bilateral); Colonoscopy (2017); hernia repair; and pr exploratory of abdomen (N/A, 2/5/2018).     CURRENT MEDICATIONS       Discharge Medication List as of 4/8/2019  8:14 PM      CONTINUE these medications which have NOT CHANGED    Details   rOPINIRole (REQUIP) 0.25 MG tablet Take 1 tablet by mouth nightly take 1 tablet by mouth 2 hour before sleep daily for 5 days then take 2 tablets by mouth 2 hour before sleep, Disp-90 tablet, R-3Normal      spironolactone (ALDACTONE) 25 MG tablet TAKE 1 TABLET BY MOUTH EVERY DAY, Disp-90 tablet, R-3Normal      atenolol (TENORMIN) 100 MG tablet Take 1 tablet by mouth daily, Disp-30 tablet, R-6Normal      tamsulosin (FLOMAX) 0.4 MG capsule Take 2 capsules by mouth nightly, Disp-180 capsule, R-3Normal      oxybutynin (DITROPAN) 5 MG tablet Take 1 tablet by mouth 3 times daily, Disp-90 tablet, R-3Normal      Bismuth Subsalicylate (PEPTO-BISMOL PO) Take by mouth as needed PATIENT STATES THAT HE'S UP TO 1 BOTTLE WEEKLYHistorical Med      potassium chloride (KLOR-CON M) 20 MEQ extended release tablet Take 2 tablets by mouth 2 times daily, Disp-120 tablet, R-3Normal oxyCODONE-acetaminophen (PERCOCET)  MG per tablet Take 1 tablet by mouth every 6 hours as needed for Pain. Historical Med      cloNIDine (CATAPRES) 0.2 MG tablet Take 0.2 mg by mouth 3 times dailyHistorical Med      levothyroxine (SYNTHROID) 175 MCG tablet Take 175 mcg by mouth DailyHistorical Med      pantoprazole (PROTONIX) 40 MG tablet Take 1 tablet by mouth every morning (before breakfast), Disp-30 tablet, R-0Normal      amLODIPine (NORVASC) 10 MG tablet Take 10 mg by mouth dailyHistorical Med      aspirin 81 MG tablet Take 81 mg by mouth dailyHistorical Med      polyethylene glycol (GLYCOLAX) powder Take 17 g by mouth daily as needed Historical Med      cyclobenzaprine (FLEXERIL) 10 MG tablet Take 10 mg by mouth 3 times daily as needed for Muscle spasmsHistorical Med      albuterol (PROVENTIL HFA;VENTOLIN HFA) 108 (90 BASE) MCG/ACT inhaler Inhale 1 puff into the lungs every 6 hours as needed for Wheezing. Historical Med      zolpidem (AMBIEN CR) 12.5 MG CR tablet Take 12.5 mg by mouth nightly as needed. Historical Med      azelastine (ASTELIN) 137 MCG/SPRAY nasal spray 1 spray by Nasal route as needed. Use in each nostril as directedHistorical Med             ALLERGIES     is allergic to shellfish-derived products; pcn [penicillins]; succinylcholine; morphine; and tape [adhesive tape]. FAMILY HISTORY     indicated that his mother is . He indicated that his father is . He indicated that the status of his brother is unknown. He indicated that the status of his paternal grandmother is unknown. He indicated that the status of his neg hx is unknown.   family history includes Cancer in his father and mother; Diabetes in his paternal grandmother; Heart Disease in his father; Stroke in his brother. SOCIAL HISTORY    reports that he has never smoked. His smokeless tobacco use includes snuff. He reports that he does not drink alcohol or use drugs.     PHYSICAL EXAM     INITIAL VITALS:  height is against medical advice today, they should not hesitate to return to the emergency room at any time should they change their mind, need re-evaluation or desire further care. I have given the patient strict written and verbal instructions about care at home, follow-up, and signs and symptoms of worsening of condition and they did verbalize understanding. CRITICAL CARE:   None    CONSULTS:  None    PROCEDURES:  None    FINAL IMPRESSION      1. Hypertensive urgency    2. Acute nonintractable headache, unspecified headache type    3. AKINS (dyspnea on exertion)          DISPOSITION/PLAN   Leaving AMA  1. Hypertensive urgency    2. Acute nonintractable headache, unspecified headache type    3. AKINS (dyspnea on exertion)        PATIENT REFERRED TO:  DO Neha Lazaro [address]  [address] [facility][note 3]  [phone]    Schedule an appointment as soon as possible for a visit       [facility] [id number] [place] [address]  [phone]  Schedule an appointment as soon as possible for a visit       Ziyad Mensah MD  [address] [address]  [phone]    Schedule an appointment as soon as possible for a visit         DISCHARGE MEDICATIONS:  Discharge Medication List as of 4/8/2019  8:14 PM          (Please note that portions of this note were completed with a voice recognition program.  Efforts were made to edit the dictations but occasionally words are mis-transcribed.)    Scribe:  Gurpreetena Pod 4/8/19 6:00 PM Scribing for and in the presence of Wendy Ball PA-C. Signed by: Amanda Mota 04/11/19 8:30 PM    Provider:  I personally performed the services described in the documentation, reviewed and edited the documentation which was dictated to the scribe in my presence, and it accurately records my words and actions.     Wendy Ball PA-C 04/11/19 8:30 PM    LIVAN Villatoro [place]  04/11/19 2035

## 2019-04-08 NOTE — ED TRIAGE NOTES
Pt comes to the ED with headache and HTN. Pt states this was as high as 185/114 and started days ago. Pt states this has caused a head ache which is what brought it in,.

## 2019-04-09 ENCOUNTER — OFFICE VISIT (OUTPATIENT)
Dept: CARDIOLOGY CLINIC | Age: 59
End: 2019-04-09
Payer: MEDICARE

## 2019-04-09 VITALS
WEIGHT: 315 LBS | SYSTOLIC BLOOD PRESSURE: 182 MMHG | HEIGHT: 73 IN | DIASTOLIC BLOOD PRESSURE: 92 MMHG | BODY MASS INDEX: 41.75 KG/M2 | HEART RATE: 61 BPM

## 2019-04-09 DIAGNOSIS — I10 ESSENTIAL HYPERTENSION: ICD-10-CM

## 2019-04-09 DIAGNOSIS — R06.09 DOE (DYSPNEA ON EXERTION): Primary | ICD-10-CM

## 2019-04-09 PROCEDURE — G8417 CALC BMI ABV UP PARAM F/U: HCPCS | Performed by: PHYSICIAN ASSISTANT

## 2019-04-09 PROCEDURE — 99213 OFFICE O/P EST LOW 20 MIN: CPT | Performed by: PHYSICIAN ASSISTANT

## 2019-04-09 PROCEDURE — G8428 CUR MEDS NOT DOCUMENT: HCPCS | Performed by: PHYSICIAN ASSISTANT

## 2019-04-09 PROCEDURE — 3017F COLORECTAL CA SCREEN DOC REV: CPT | Performed by: PHYSICIAN ASSISTANT

## 2019-04-09 PROCEDURE — 4004F PT TOBACCO SCREEN RCVD TLK: CPT | Performed by: PHYSICIAN ASSISTANT

## 2019-04-09 RX ORDER — SPIRONOLACTONE 50 MG/1
50 TABLET, FILM COATED ORAL DAILY
Qty: 30 TABLET | Refills: 3 | Status: SHIPPED | OUTPATIENT
Start: 2019-04-09 | End: 2019-08-02 | Stop reason: SDUPTHER

## 2019-04-09 NOTE — ED NOTES
Pt up to the bathroom without assist.  No other needs at this time. Provider in room.        Paulette Thrasher RN  04/08/19 2006

## 2019-04-09 NOTE — PROGRESS NOTES
Pt C/O SOB anytime, headaches, heart palpitations, swelling in feet, very fatigued. .  Pt went to ER last night with terrible headaches. Pt did not bring meds or list and states nothing has changed       Pt denies CP, dizziness               [facility]  [facility]   [address]    [address] [address]   Dept: [phone]   Dept Fax: [phone]   Loc: [phone]      Chief Complaint   Patient presents with   [facility] New Patient     high bp er f/u     Patient with a history of hypertension presents for new visit. Patient was recently seen in the emergency Department for hypertension. He states at times when his blood pressure is elevated he gets a headache. He denies any focal deficits. He denies any chest pain. He has intermittent shortness of breath. Occasional palpitations.         General:   No fever, no chills, No fatigue or weight loss  Pulmonary:    intermittent shortness of breath  Cardiac:    Denies recent chest pain   GI:     No nausea or vomiting, no abdominal pain  Neuro:    No dizziness or light headedness  Musculoskeletal:  No recent active issues  Extremities:   No edema, good peripheral pulses      Past Medical History:   Diagnosis Date    Arthritis     Back problem     Constipation     Hypertension     Sleep apnea     has CPAP    Thyroid disease        Allergies   Allergen Reactions    Shellfish-Derived Products Swelling    Pcn [Penicillins] Itching    Succinylcholine      Pseudocholinesterase Deficiency    Morphine Nausea And Vomiting     \"head swimming, skin crawling\"    Tape [Adhesive Tape] Rash       Current Outpatient Medications   Medication Sig Dispense Refill    spironolactone (ALDACTONE) 50 MG tablet Take 1 tablet by mouth daily 30 tablet 3    rOPINIRole (REQUIP) 0.25 MG tablet Take 1 tablet by mouth nightly take 1 tablet by mouth 2 hour before sleep daily for 5 days then take 2 tablets by mouth 2 hour before sleep 90 tablet 3    atenolol (TENORMIN) 100 MG tablet Take 1 tablet by mouth daily 30 tablet 6    tamsulosin (FLOMAX) 0.4 MG capsule Take 2 capsules by mouth nightly 180 capsule 3    oxybutynin (DITROPAN) 5 MG tablet Take 1 tablet by mouth 3 times daily 90 tablet 3    Bismuth Subsalicylate (PEPTO-BISMOL PO) Take by mouth as needed PATIENT STATES THAT HE'S UP TO 1 BOTTLE WEEKLY      potassium chloride (KLOR-CON M) 20 MEQ extended release tablet Take 2 tablets by mouth 2 times daily 120 tablet 3    oxyCODONE-acetaminophen (PERCOCET)  MG per tablet Take 1 tablet by mouth every 6 hours as needed for Pain.  cloNIDine (CATAPRES) 0.2 MG tablet Take 0.2 mg by mouth 3 times daily      levothyroxine (SYNTHROID) 175 MCG tablet Take 175 mcg by mouth Daily      pantoprazole (PROTONIX) 40 MG tablet Take 1 tablet by mouth every morning (before breakfast) (Patient taking differently: Take 40 mg by mouth as needed ) 30 tablet 0    amLODIPine (NORVASC) 10 MG tablet Take 10 mg by mouth daily      aspirin 81 MG tablet Take 81 mg by mouth daily      polyethylene glycol (GLYCOLAX) powder Take 17 g by mouth daily as needed       cyclobenzaprine (FLEXERIL) 10 MG tablet Take 10 mg by mouth 3 times daily as needed for Muscle spasms      albuterol (PROVENTIL HFA;VENTOLIN HFA) 108 (90 BASE) MCG/ACT inhaler Inhale 1 puff into the lungs every 6 hours as needed for Wheezing.  zolpidem (AMBIEN CR) 12.5 MG CR tablet Take 12.5 mg by mouth nightly as needed.  azelastine (ASTELIN) 137 MCG/SPRAY nasal spray 1 spray by Nasal route as needed. Use in each nostril as directed      hydrALAZINE (APRESOLINE) 50 MG tablet Take 1 tablet by mouth every 8 hours 90 tablet 3    nicotine (NICODERM CQ) 21 MG/24HR Place 1 patch onto the skin daily 30 patch 3    acetaminophen (TYLENOL) 500 MG tablet Take 1 tablet by mouth every 6 hours as needed for Pain 40 tablet 0     No current facility-administered medications for this visit. Social History     Socioeconomic History    Marital status:      Spouse name: None    Number of children: None    Years of education: None    Highest education level: None   Occupational History    None   Social Needs    Financial resource strain: None    Food insecurity:     Worry: None     Inability: None    Transportation needs:     Medical: None     Non-medical: None   Tobacco Use    Smoking status: Never Smoker    Smokeless tobacco: Current User     Types: Snuff    Tobacco comment: quit pipe over 30 yrs ago   Substance and Sexual Activity    Alcohol use: No     Alcohol/week: 0.0 oz     Comment: quit    Drug use: No    Sexual activity: Yes   Lifestyle    Physical activity:     Days per week: None     Minutes per session: None    Stress: None   Relationships    Social connections:     Talks on phone: None     Gets together: None     Attends Scientologist service: None     Active member of club or organization: None     Attends meetings of clubs or organizations: None     Relationship status: None    Intimate partner violence:     Fear of current or ex partner: None     Emotionally abused: None     Physically abused: None     Forced sexual activity: None   Other Topics Concern    None   Social History Narrative    None       Family History   Problem Relation Age of Onset    Cancer Mother         breast    Heart Disease Father         bladder, lung    Cancer Father     Stroke Brother     Diabetes Paternal Grandmother     High Blood Pressure Neg Hx        Blood pressure (!) 182/92, pulse 61, height 6' 1\" (1.854 m), weight (!) 352 lb (159.7 kg). General:   Well developed, well nourished  Lungs:   Clear to auscultation  Heart:    Normal S1 S2, No murmur, rubs, or gallops  Abdomen:   Soft, non tender, no organomegalies, positive bowel sounds  Extremities:   No edema, no cyanosis, good peripheral pulses  Neurological:   Awake, alert, oriented.  No obvious focal deficits  Musculoskeletal:  No obvious deformities         Diagnosis Orders   1. AKINS (dyspnea on exertion)  ECHO Complete 2D W Doppler W Color    Stress test, lexiscan   2. Essential hypertension         Orders Placed This Encounter   Procedures    Stress test, lexiscan     To be done by Dr Lien Constanitno     Standing Status:   Future     Standing Expiration Date:   4/8/2020     Order Specific Question:   Reason for Exam?     Answer:   Shortness of breath    ECHO Complete 2D W Doppler W Color     Standing Status:   Future     Standing Expiration Date:   4/8/2020     Order Specific Question:   Reason for exam:     Answer:   AKINS       Increase Aldactone to 50 mg daily. Continue to monitor blood pressure closely. We will check a 2-D echocardiogram and stress test.    Continue all other current medications and with constant vigilance to changes in symptoms and also any potential side effects. Return for care if become worse or seek medical attention immediately. The patient is educated on symptoms of heart disease that include chest pain and passing out, dizziness, etc and to report them if there is any change or go to emergency room.        Follow up with myself as scheduled or sooner if needed  (Please note that portions of this note were completed with a voice recognition program.  Efforts were made to edit the dictation but occasionally words are mis-transcribed.)      Giacomo Villafuerte PA-C

## 2019-04-10 ENCOUNTER — TELEPHONE (OUTPATIENT)
Dept: NEPHROLOGY | Age: 59
End: 2019-04-10

## 2019-04-10 NOTE — TELEPHONE ENCOUNTER
Patient called stating that he went to the ER on 4/8 for a severe headache-his BP was 200/110-they wanted to keep him at the hospital but he refused-SAMANTHA Rogers switched his Spironolactone to 25 mg bid-he was also scheduled for a EKG and a stress test for 4/26-the patients BP was 197/108 today-he states he has trouble with his back and he has a lot of back pain today-the patient has no energy and is a little short of breath today-Please advise

## 2019-04-10 NOTE — TELEPHONE ENCOUNTER
I called the patient and gave him this information-he states that his BP is 108/86 right now-Im not sure if he will go to the ED or not

## 2019-04-11 ENCOUNTER — APPOINTMENT (OUTPATIENT)
Dept: GENERAL RADIOLOGY | Age: 59
DRG: 305 | End: 2019-04-11
Payer: MEDICARE

## 2019-04-11 ENCOUNTER — HOSPITAL ENCOUNTER (INPATIENT)
Age: 59
LOS: 2 days | Discharge: HOME | DRG: 305 | End: 2019-04-13
Attending: EMERGENCY MEDICINE | Admitting: INTERNAL MEDICINE
Payer: MEDICARE

## 2019-04-11 DIAGNOSIS — I16.0 HYPERTENSIVE URGENCY: Primary | ICD-10-CM

## 2019-04-11 PROBLEM — I16.1 HYPERTENSIVE EMERGENCY: Status: ACTIVE | Noted: 2019-04-11

## 2019-04-11 LAB
ANION GAP SERPL CALCULATED.3IONS-SCNC: 10 MEQ/L (ref 8–16)
BASOPHILS # BLD: 1 %
BASOPHILS ABSOLUTE: 0.1 THOU/MM3 (ref 0–0.1)
BUN BLDV-MCNC: 12 MG/DL (ref 7–22)
CALCIUM SERPL-MCNC: 9 MG/DL (ref 8.5–10.5)
CHLORIDE BLD-SCNC: 103 MEQ/L (ref 98–111)
CO2: 23 MEQ/L (ref 23–33)
CREAT SERPL-MCNC: 0.9 MG/DL (ref 0.4–1.2)
EOSINOPHIL # BLD: 3.9 %
EOSINOPHILS ABSOLUTE: 0.3 THOU/MM3 (ref 0–0.4)
ERYTHROCYTE [DISTWIDTH] IN BLOOD BY AUTOMATED COUNT: 14.6 % (ref 11.5–14.5)
ERYTHROCYTE [DISTWIDTH] IN BLOOD BY AUTOMATED COUNT: 44.8 FL (ref 35–45)
GFR SERPL CREATININE-BSD FRML MDRD: 87 ML/MIN/1.73M2
GLUCOSE BLD-MCNC: 134 MG/DL (ref 70–108)
HCT VFR BLD CALC: 43.2 % (ref 42–52)
HEMOGLOBIN: 14.3 GM/DL (ref 14–18)
IMMATURE GRANS (ABS): 0.03 THOU/MM3 (ref 0–0.07)
IMMATURE GRANULOCYTES: 0.4 %
LYMPHOCYTES # BLD: 14.8 %
LYMPHOCYTES ABSOLUTE: 1.2 THOU/MM3 (ref 1–4.8)
MCH RBC QN AUTO: 28.4 PG (ref 26–33)
MCHC RBC AUTO-ENTMCNC: 33.1 GM/DL (ref 32.2–35.5)
MCV RBC AUTO: 85.7 FL (ref 80–94)
MONOCYTES # BLD: 11 %
MONOCYTES ABSOLUTE: 0.9 THOU/MM3 (ref 0.4–1.3)
NUCLEATED RED BLOOD CELLS: 0 /100 WBC
OSMOLALITY CALCULATION: 273.7 MOSMOL/KG (ref 275–300)
PLATELET # BLD: 257 THOU/MM3 (ref 130–400)
PMV BLD AUTO: 9.4 FL (ref 9.4–12.4)
POTASSIUM SERPL-SCNC: 3.4 MEQ/L (ref 3.5–5.2)
PRO-BNP: 576.1 PG/ML (ref 0–900)
RBC # BLD: 5.04 MILL/MM3 (ref 4.7–6.1)
SEG NEUTROPHILS: 68.9 %
SEGMENTED NEUTROPHILS ABSOLUTE COUNT: 5.6 THOU/MM3 (ref 1.8–7.7)
SODIUM BLD-SCNC: 136 MEQ/L (ref 135–145)
T4 FREE: 1.17 NG/DL (ref 0.93–1.76)
TROPONIN T: < 0.01 NG/ML
TROPONIN T: < 0.01 NG/ML
TSH SERPL DL<=0.05 MIU/L-ACNC: 8.21 UIU/ML (ref 0.4–4.2)
WBC # BLD: 8.1 THOU/MM3 (ref 4.8–10.8)

## 2019-04-11 PROCEDURE — 80048 BASIC METABOLIC PNL TOTAL CA: CPT

## 2019-04-11 PROCEDURE — 84439 ASSAY OF FREE THYROXINE: CPT

## 2019-04-11 PROCEDURE — 83880 ASSAY OF NATRIURETIC PEPTIDE: CPT

## 2019-04-11 PROCEDURE — 6360000002 HC RX W HCPCS: Performed by: EMERGENCY MEDICINE

## 2019-04-11 PROCEDURE — 71046 X-RAY EXAM CHEST 2 VIEWS: CPT

## 2019-04-11 PROCEDURE — 6370000000 HC RX 637 (ALT 250 FOR IP): Performed by: EMERGENCY MEDICINE

## 2019-04-11 PROCEDURE — 2140000000 HC CCU INTERMEDIATE R&B

## 2019-04-11 PROCEDURE — 84484 ASSAY OF TROPONIN QUANT: CPT

## 2019-04-11 PROCEDURE — 36415 COLL VENOUS BLD VENIPUNCTURE: CPT

## 2019-04-11 PROCEDURE — 2709999900 HC NON-CHARGEABLE SUPPLY

## 2019-04-11 PROCEDURE — 93005 ELECTROCARDIOGRAM TRACING: CPT | Performed by: EMERGENCY MEDICINE

## 2019-04-11 PROCEDURE — 85025 COMPLETE CBC W/AUTO DIFF WBC: CPT

## 2019-04-11 PROCEDURE — 96374 THER/PROPH/DIAG INJ IV PUSH: CPT

## 2019-04-11 PROCEDURE — 99223 1ST HOSP IP/OBS HIGH 75: CPT | Performed by: PHYSICIAN ASSISTANT

## 2019-04-11 PROCEDURE — 2580000003 HC RX 258: Performed by: PHYSICIAN ASSISTANT

## 2019-04-11 PROCEDURE — 84443 ASSAY THYROID STIM HORMONE: CPT

## 2019-04-11 PROCEDURE — 6370000000 HC RX 637 (ALT 250 FOR IP): Performed by: PHYSICIAN ASSISTANT

## 2019-04-11 PROCEDURE — 2580000003 HC RX 258: Performed by: EMERGENCY MEDICINE

## 2019-04-11 PROCEDURE — 99284 EMERGENCY DEPT VISIT MOD MDM: CPT

## 2019-04-11 PROCEDURE — 96375 TX/PRO/DX INJ NEW DRUG ADDON: CPT

## 2019-04-11 RX ORDER — CLONIDINE HYDROCHLORIDE 0.2 MG/1
0.2 TABLET ORAL ONCE
Status: COMPLETED | OUTPATIENT
Start: 2019-04-11 | End: 2019-04-11

## 2019-04-11 RX ORDER — CLONIDINE HYDROCHLORIDE 0.2 MG/1
0.2 TABLET ORAL 3 TIMES DAILY
Status: DISCONTINUED | OUTPATIENT
Start: 2019-04-11 | End: 2019-04-13 | Stop reason: HOSPADM

## 2019-04-11 RX ORDER — HYDRALAZINE HYDROCHLORIDE 20 MG/ML
10 INJECTION INTRAMUSCULAR; INTRAVENOUS ONCE
Status: COMPLETED | OUTPATIENT
Start: 2019-04-11 | End: 2019-04-11

## 2019-04-11 RX ORDER — KETOROLAC TROMETHAMINE 30 MG/ML
15 INJECTION, SOLUTION INTRAMUSCULAR; INTRAVENOUS ONCE
Status: DISCONTINUED | OUTPATIENT
Start: 2019-04-11 | End: 2019-04-11

## 2019-04-11 RX ORDER — ACETAMINOPHEN 325 MG/1
650 TABLET ORAL EVERY 4 HOURS PRN
Status: DISCONTINUED | OUTPATIENT
Start: 2019-04-11 | End: 2019-04-13 | Stop reason: HOSPADM

## 2019-04-11 RX ORDER — CYCLOBENZAPRINE HCL 10 MG
10 TABLET ORAL 3 TIMES DAILY PRN
Status: DISCONTINUED | OUTPATIENT
Start: 2019-04-11 | End: 2019-04-13 | Stop reason: HOSPADM

## 2019-04-11 RX ORDER — POLYETHYLENE GLYCOL 3350 17 G/17G
17 POWDER, FOR SOLUTION ORAL DAILY PRN
Status: DISCONTINUED | OUTPATIENT
Start: 2019-04-11 | End: 2019-04-13 | Stop reason: HOSPADM

## 2019-04-11 RX ORDER — OXYBUTYNIN CHLORIDE 5 MG/1
5 TABLET ORAL 3 TIMES DAILY
Status: DISCONTINUED | OUTPATIENT
Start: 2019-04-11 | End: 2019-04-13 | Stop reason: HOSPADM

## 2019-04-11 RX ORDER — TAMSULOSIN HYDROCHLORIDE 0.4 MG/1
0.8 CAPSULE ORAL NIGHTLY
Status: DISCONTINUED | OUTPATIENT
Start: 2019-04-11 | End: 2019-04-13 | Stop reason: HOSPADM

## 2019-04-11 RX ORDER — ASPIRIN 81 MG/1
81 TABLET ORAL DAILY
Status: DISCONTINUED | OUTPATIENT
Start: 2019-04-12 | End: 2019-04-13 | Stop reason: HOSPADM

## 2019-04-11 RX ORDER — METOCLOPRAMIDE HYDROCHLORIDE 5 MG/ML
10 INJECTION INTRAMUSCULAR; INTRAVENOUS ONCE
Status: COMPLETED | OUTPATIENT
Start: 2019-04-11 | End: 2019-04-11

## 2019-04-11 RX ORDER — OXYCODONE AND ACETAMINOPHEN 10; 325 MG/1; MG/1
1 TABLET ORAL EVERY 6 HOURS PRN
Status: DISCONTINUED | OUTPATIENT
Start: 2019-04-11 | End: 2019-04-13 | Stop reason: HOSPADM

## 2019-04-11 RX ORDER — SPIRONOLACTONE 25 MG/1
50 TABLET ORAL DAILY
Status: DISCONTINUED | OUTPATIENT
Start: 2019-04-12 | End: 2019-04-13 | Stop reason: HOSPADM

## 2019-04-11 RX ORDER — SODIUM CHLORIDE 0.9 % (FLUSH) 0.9 %
10 SYRINGE (ML) INJECTION PRN
Status: DISCONTINUED | OUTPATIENT
Start: 2019-04-11 | End: 2019-04-13 | Stop reason: HOSPADM

## 2019-04-11 RX ORDER — FENTANYL CITRATE 50 UG/ML
50 INJECTION, SOLUTION INTRAMUSCULAR; INTRAVENOUS ONCE
Status: DISCONTINUED | OUTPATIENT
Start: 2019-04-11 | End: 2019-04-11

## 2019-04-11 RX ORDER — KETOROLAC TROMETHAMINE 30 MG/ML
30 INJECTION, SOLUTION INTRAMUSCULAR; INTRAVENOUS ONCE
Status: COMPLETED | OUTPATIENT
Start: 2019-04-11 | End: 2019-04-11

## 2019-04-11 RX ORDER — AMLODIPINE BESYLATE 10 MG/1
10 TABLET ORAL DAILY
Status: DISCONTINUED | OUTPATIENT
Start: 2019-04-12 | End: 2019-04-13 | Stop reason: HOSPADM

## 2019-04-11 RX ORDER — ROPINIROLE 0.25 MG/1
0.25 TABLET, FILM COATED ORAL NIGHTLY
Status: DISCONTINUED | OUTPATIENT
Start: 2019-04-11 | End: 2019-04-13 | Stop reason: HOSPADM

## 2019-04-11 RX ORDER — PANTOPRAZOLE SODIUM 40 MG/1
40 TABLET, DELAYED RELEASE ORAL
Status: DISCONTINUED | OUTPATIENT
Start: 2019-04-12 | End: 2019-04-13 | Stop reason: HOSPADM

## 2019-04-11 RX ORDER — 0.9 % SODIUM CHLORIDE 0.9 %
1000 INTRAVENOUS SOLUTION INTRAVENOUS ONCE
Status: COMPLETED | OUTPATIENT
Start: 2019-04-11 | End: 2019-04-11

## 2019-04-11 RX ORDER — POTASSIUM CHLORIDE 20 MEQ/1
40 TABLET, EXTENDED RELEASE ORAL PRN
Status: DISCONTINUED | OUTPATIENT
Start: 2019-04-11 | End: 2019-04-13 | Stop reason: HOSPADM

## 2019-04-11 RX ORDER — ONDANSETRON 2 MG/ML
4 INJECTION INTRAMUSCULAR; INTRAVENOUS EVERY 6 HOURS PRN
Status: DISCONTINUED | OUTPATIENT
Start: 2019-04-11 | End: 2019-04-13 | Stop reason: HOSPADM

## 2019-04-11 RX ORDER — SODIUM CHLORIDE 0.9 % (FLUSH) 0.9 %
10 SYRINGE (ML) INJECTION EVERY 12 HOURS SCHEDULED
Status: DISCONTINUED | OUTPATIENT
Start: 2019-04-11 | End: 2019-04-13 | Stop reason: HOSPADM

## 2019-04-11 RX ORDER — ZOLPIDEM TARTRATE 10 MG/1
10 TABLET ORAL NIGHTLY PRN
Status: DISCONTINUED | OUTPATIENT
Start: 2019-04-12 | End: 2019-04-11

## 2019-04-11 RX ORDER — HYDRALAZINE HYDROCHLORIDE 20 MG/ML
20 INJECTION INTRAMUSCULAR; INTRAVENOUS EVERY 6 HOURS PRN
Status: DISCONTINUED | OUTPATIENT
Start: 2019-04-11 | End: 2019-04-12

## 2019-04-11 RX ORDER — DIPHENHYDRAMINE HYDROCHLORIDE 50 MG/ML
25 INJECTION INTRAMUSCULAR; INTRAVENOUS ONCE
Status: COMPLETED | OUTPATIENT
Start: 2019-04-11 | End: 2019-04-11

## 2019-04-11 RX ORDER — AZELASTINE 1 MG/ML
1 SPRAY, METERED NASAL 2 TIMES DAILY
Status: DISCONTINUED | OUTPATIENT
Start: 2019-04-11 | End: 2019-04-13 | Stop reason: HOSPADM

## 2019-04-11 RX ORDER — LEVOTHYROXINE SODIUM 0.1 MG/1
200 TABLET ORAL DAILY
Status: DISCONTINUED | OUTPATIENT
Start: 2019-04-12 | End: 2019-04-13 | Stop reason: HOSPADM

## 2019-04-11 RX ORDER — ATENOLOL 100 MG/1
100 TABLET ORAL DAILY
Status: DISCONTINUED | OUTPATIENT
Start: 2019-04-12 | End: 2019-04-13 | Stop reason: HOSPADM

## 2019-04-11 RX ORDER — ZOLPIDEM TARTRATE 10 MG/1
10 TABLET ORAL NIGHTLY PRN
Status: DISCONTINUED | OUTPATIENT
Start: 2019-04-11 | End: 2019-04-13 | Stop reason: HOSPADM

## 2019-04-11 RX ORDER — METHYLPREDNISOLONE SODIUM SUCCINATE 125 MG/2ML
125 INJECTION, POWDER, LYOPHILIZED, FOR SOLUTION INTRAMUSCULAR; INTRAVENOUS ONCE
Status: COMPLETED | OUTPATIENT
Start: 2019-04-11 | End: 2019-04-11

## 2019-04-11 RX ORDER — NICOTINE 21 MG/24HR
1 PATCH, TRANSDERMAL 24 HOURS TRANSDERMAL DAILY
Status: DISCONTINUED | OUTPATIENT
Start: 2019-04-12 | End: 2019-04-13 | Stop reason: HOSPADM

## 2019-04-11 RX ORDER — POTASSIUM CHLORIDE 7.45 MG/ML
10 INJECTION INTRAVENOUS PRN
Status: DISCONTINUED | OUTPATIENT
Start: 2019-04-11 | End: 2019-04-13 | Stop reason: HOSPADM

## 2019-04-11 RX ADMIN — SODIUM CHLORIDE 1000 ML: 9 INJECTION, SOLUTION INTRAVENOUS at 20:11

## 2019-04-11 RX ADMIN — HYDRALAZINE HYDROCHLORIDE 10 MG: 20 INJECTION INTRAMUSCULAR; INTRAVENOUS at 21:24

## 2019-04-11 RX ADMIN — DIPHENHYDRAMINE HYDROCHLORIDE 25 MG: 50 INJECTION, SOLUTION INTRAMUSCULAR; INTRAVENOUS at 20:11

## 2019-04-11 RX ADMIN — PROCHLORPERAZINE EDISYLATE 10 MG: 5 INJECTION INTRAMUSCULAR; INTRAVENOUS at 20:13

## 2019-04-11 RX ADMIN — TAMSULOSIN HYDROCHLORIDE 0.8 MG: 0.4 CAPSULE ORAL at 22:53

## 2019-04-11 RX ADMIN — Medication 10 ML: at 23:53

## 2019-04-11 RX ADMIN — METOCLOPRAMIDE 10 MG: 5 INJECTION, SOLUTION INTRAMUSCULAR; INTRAVENOUS at 20:16

## 2019-04-11 RX ADMIN — ZOLPIDEM TARTRATE 10 MG: 10 TABLET ORAL at 23:59

## 2019-04-11 RX ADMIN — AZELASTINE HYDROCHLORIDE 1 SPRAY: 137 SPRAY, METERED NASAL at 22:55

## 2019-04-11 RX ADMIN — METHYLPREDNISOLONE SODIUM SUCCINATE 125 MG: 125 INJECTION, POWDER, FOR SOLUTION INTRAMUSCULAR; INTRAVENOUS at 20:21

## 2019-04-11 RX ADMIN — KETOROLAC TROMETHAMINE 30 MG: 30 INJECTION, SOLUTION INTRAMUSCULAR; INTRAVENOUS at 21:23

## 2019-04-11 RX ADMIN — ROPINIROLE HYDROCHLORIDE 0.25 MG: 0.25 TABLET, FILM COATED ORAL at 22:53

## 2019-04-11 RX ADMIN — OXYBUTYNIN CHLORIDE 5 MG: 5 TABLET ORAL at 22:53

## 2019-04-11 RX ADMIN — CLONIDINE HYDROCHLORIDE 0.2 MG: 0.2 TABLET ORAL at 20:11

## 2019-04-11 ASSESSMENT — PAIN SCALES - GENERAL
PAINLEVEL_OUTOF10: 3
PAINLEVEL_OUTOF10: 8

## 2019-04-11 ASSESSMENT — ENCOUNTER SYMPTOMS
ABDOMINAL PAIN: 0
COUGH: 0
VOMITING: 0
SHORTNESS OF BREATH: 1
CONSTIPATION: 0
DIARRHEA: 0
SORE THROAT: 0
NAUSEA: 0
RHINORRHEA: 0
WHEEZING: 0

## 2019-04-11 ASSESSMENT — HEART SCORE: ECG: 1

## 2019-04-11 ASSESSMENT — PAIN DESCRIPTION - LOCATION: LOCATION: HEAD

## 2019-04-11 NOTE — ED TRIAGE NOTES
Patient presents to the ED with complaints of HTN that has been ongoing for the last few days. Patient was seen here on Monday for similar complaints. Patient is complaining of a headache as well at this time but denies any CP. Patient complaining of some SOB with exertion but none at this time.

## 2019-04-12 LAB
ANION GAP SERPL CALCULATED.3IONS-SCNC: 13 MEQ/L (ref 8–16)
BASOPHILS # BLD: 0.3 %
BASOPHILS ABSOLUTE: 0 THOU/MM3 (ref 0–0.1)
BUN BLDV-MCNC: 12 MG/DL (ref 7–22)
CALCIUM SERPL-MCNC: 8.7 MG/DL (ref 8.5–10.5)
CHLORIDE BLD-SCNC: 103 MEQ/L (ref 98–111)
CO2: 24 MEQ/L (ref 23–33)
CREAT SERPL-MCNC: 0.9 MG/DL (ref 0.4–1.2)
EOSINOPHIL # BLD: 0.1 %
EOSINOPHILS ABSOLUTE: 0 THOU/MM3 (ref 0–0.4)
ERYTHROCYTE [DISTWIDTH] IN BLOOD BY AUTOMATED COUNT: 14.3 % (ref 11.5–14.5)
ERYTHROCYTE [DISTWIDTH] IN BLOOD BY AUTOMATED COUNT: 44.5 FL (ref 35–45)
GFR SERPL CREATININE-BSD FRML MDRD: 87 ML/MIN/1.73M2
GLUCOSE BLD-MCNC: 205 MG/DL (ref 70–108)
HCT VFR BLD CALC: 44.5 % (ref 42–52)
HEMOGLOBIN: 14.6 GM/DL (ref 14–18)
IMMATURE GRANS (ABS): 0.02 THOU/MM3 (ref 0–0.07)
IMMATURE GRANULOCYTES: 0.3 %
LYMPHOCYTES # BLD: 8.9 %
LYMPHOCYTES ABSOLUTE: 0.6 THOU/MM3 (ref 1–4.8)
MCH RBC QN AUTO: 28.2 PG (ref 26–33)
MCHC RBC AUTO-ENTMCNC: 32.8 GM/DL (ref 32.2–35.5)
MCV RBC AUTO: 85.9 FL (ref 80–94)
MONOCYTES # BLD: 1.5 %
MONOCYTES ABSOLUTE: 0.1 THOU/MM3 (ref 0.4–1.3)
NUCLEATED RED BLOOD CELLS: 0 /100 WBC
PLATELET # BLD: 259 THOU/MM3 (ref 130–400)
PMV BLD AUTO: 9.5 FL (ref 9.4–12.4)
POTASSIUM REFLEX MAGNESIUM: 3.6 MEQ/L (ref 3.5–5.2)
RBC # BLD: 5.18 MILL/MM3 (ref 4.7–6.1)
SEG NEUTROPHILS: 88.9 %
SEGMENTED NEUTROPHILS ABSOLUTE COUNT: 6.3 THOU/MM3 (ref 1.8–7.7)
SODIUM BLD-SCNC: 140 MEQ/L (ref 135–145)
WBC # BLD: 7.1 THOU/MM3 (ref 4.8–10.8)

## 2019-04-12 PROCEDURE — 94660 CPAP INITIATION&MGMT: CPT

## 2019-04-12 PROCEDURE — 6370000000 HC RX 637 (ALT 250 FOR IP): Performed by: INTERNAL MEDICINE

## 2019-04-12 PROCEDURE — 85025 COMPLETE CBC W/AUTO DIFF WBC: CPT

## 2019-04-12 PROCEDURE — 6360000002 HC RX W HCPCS: Performed by: PHYSICIAN ASSISTANT

## 2019-04-12 PROCEDURE — 2140000000 HC CCU INTERMEDIATE R&B

## 2019-04-12 PROCEDURE — 2709999900 HC NON-CHARGEABLE SUPPLY

## 2019-04-12 PROCEDURE — 2580000003 HC RX 258: Performed by: PHYSICIAN ASSISTANT

## 2019-04-12 PROCEDURE — 6370000000 HC RX 637 (ALT 250 FOR IP): Performed by: PHYSICIAN ASSISTANT

## 2019-04-12 PROCEDURE — 36415 COLL VENOUS BLD VENIPUNCTURE: CPT

## 2019-04-12 PROCEDURE — 80048 BASIC METABOLIC PNL TOTAL CA: CPT

## 2019-04-12 PROCEDURE — 2700000000 HC OXYGEN THERAPY PER DAY

## 2019-04-12 PROCEDURE — 99232 SBSQ HOSP IP/OBS MODERATE 35: CPT | Performed by: INTERNAL MEDICINE

## 2019-04-12 RX ORDER — HYDRALAZINE HYDROCHLORIDE 25 MG/1
25 TABLET, FILM COATED ORAL EVERY 8 HOURS SCHEDULED
Status: DISCONTINUED | OUTPATIENT
Start: 2019-04-12 | End: 2019-04-12

## 2019-04-12 RX ORDER — HYDRALAZINE HYDROCHLORIDE 50 MG/1
50 TABLET, FILM COATED ORAL EVERY 8 HOURS SCHEDULED
Status: DISCONTINUED | OUTPATIENT
Start: 2019-04-12 | End: 2019-04-13 | Stop reason: HOSPADM

## 2019-04-12 RX ADMIN — OXYCODONE HYDROCHLORIDE AND ACETAMINOPHEN 1 TABLET: 10; 325 TABLET ORAL at 23:26

## 2019-04-12 RX ADMIN — CYCLOBENZAPRINE HYDROCHLORIDE 10 MG: 10 TABLET, FILM COATED ORAL at 17:56

## 2019-04-12 RX ADMIN — LEVOTHYROXINE SODIUM 200 MCG: 100 TABLET ORAL at 08:06

## 2019-04-12 RX ADMIN — HYDRALAZINE HYDROCHLORIDE 20 MG: 20 INJECTION INTRAMUSCULAR; INTRAVENOUS at 12:44

## 2019-04-12 RX ADMIN — ROPINIROLE HYDROCHLORIDE 0.25 MG: 0.25 TABLET, FILM COATED ORAL at 19:50

## 2019-04-12 RX ADMIN — OXYBUTYNIN CHLORIDE 5 MG: 5 TABLET ORAL at 14:42

## 2019-04-12 RX ADMIN — PANTOPRAZOLE SODIUM 40 MG: 40 TABLET, DELAYED RELEASE ORAL at 08:06

## 2019-04-12 RX ADMIN — AMLODIPINE BESYLATE 10 MG: 10 TABLET ORAL at 08:07

## 2019-04-12 RX ADMIN — ASPIRIN 81 MG: 81 TABLET, COATED ORAL at 08:07

## 2019-04-12 RX ADMIN — CLONIDINE HYDROCHLORIDE 0.2 MG: 0.2 TABLET ORAL at 08:07

## 2019-04-12 RX ADMIN — CLONIDINE HYDROCHLORIDE 0.2 MG: 0.2 TABLET ORAL at 19:50

## 2019-04-12 RX ADMIN — ENOXAPARIN SODIUM 40 MG: 40 INJECTION SUBCUTANEOUS at 19:55

## 2019-04-12 RX ADMIN — ATENOLOL 100 MG: 100 TABLET ORAL at 08:07

## 2019-04-12 RX ADMIN — OXYBUTYNIN CHLORIDE 5 MG: 5 TABLET ORAL at 08:07

## 2019-04-12 RX ADMIN — AZELASTINE HYDROCHLORIDE 1 SPRAY: 137 SPRAY, METERED NASAL at 19:51

## 2019-04-12 RX ADMIN — TAMSULOSIN HYDROCHLORIDE 0.8 MG: 0.4 CAPSULE ORAL at 19:50

## 2019-04-12 RX ADMIN — Medication 10 ML: at 19:50

## 2019-04-12 RX ADMIN — SPIRONOLACTONE 50 MG: 25 TABLET ORAL at 08:06

## 2019-04-12 RX ADMIN — OXYCODONE HYDROCHLORIDE AND ACETAMINOPHEN 1 TABLET: 10; 325 TABLET ORAL at 12:43

## 2019-04-12 RX ADMIN — HYDRALAZINE HYDROCHLORIDE 25 MG: 25 TABLET, FILM COATED ORAL at 17:53

## 2019-04-12 RX ADMIN — CLONIDINE HYDROCHLORIDE 0.2 MG: 0.2 TABLET ORAL at 14:42

## 2019-04-12 RX ADMIN — Medication 10 ML: at 08:07

## 2019-04-12 RX ADMIN — AZELASTINE HYDROCHLORIDE 1 SPRAY: 137 SPRAY, METERED NASAL at 08:07

## 2019-04-12 RX ADMIN — ZOLPIDEM TARTRATE 10 MG: 10 TABLET ORAL at 23:26

## 2019-04-12 RX ADMIN — OXYBUTYNIN CHLORIDE 5 MG: 5 TABLET ORAL at 19:50

## 2019-04-12 RX ADMIN — ENOXAPARIN SODIUM 40 MG: 40 INJECTION SUBCUTANEOUS at 08:07

## 2019-04-12 ASSESSMENT — PAIN DESCRIPTION - PROGRESSION: CLINICAL_PROGRESSION: NOT CHANGED

## 2019-04-12 ASSESSMENT — PAIN SCALES - GENERAL
PAINLEVEL_OUTOF10: 0
PAINLEVEL_OUTOF10: 0
PAINLEVEL_OUTOF10: 8
PAINLEVEL_OUTOF10: 2
PAINLEVEL_OUTOF10: 0
PAINLEVEL_OUTOF10: 7

## 2019-04-12 ASSESSMENT — PAIN DESCRIPTION - PAIN TYPE
TYPE: CHRONIC PAIN
TYPE: CHRONIC PAIN

## 2019-04-12 ASSESSMENT — PAIN DESCRIPTION - ONSET: ONSET: ON-GOING

## 2019-04-12 ASSESSMENT — PAIN DESCRIPTION - ORIENTATION
ORIENTATION: LOWER
ORIENTATION: LOWER

## 2019-04-12 ASSESSMENT — PAIN - FUNCTIONAL ASSESSMENT: PAIN_FUNCTIONAL_ASSESSMENT: ACTIVITIES ARE NOT PREVENTED

## 2019-04-12 ASSESSMENT — PAIN DESCRIPTION - FREQUENCY
FREQUENCY: INTERMITTENT
FREQUENCY: INTERMITTENT

## 2019-04-12 ASSESSMENT — PAIN DESCRIPTION - LOCATION
LOCATION: BACK
LOCATION: BACK

## 2019-04-12 ASSESSMENT — PAIN DESCRIPTION - DESCRIPTORS
DESCRIPTORS: ACHING
DESCRIPTORS: ACHING

## 2019-04-12 NOTE — ED PROVIDER NOTES
[facility]  eMERGENCY dEPARTMENT eNCOUnter          CHIEF COMPLAINT       Chief Complaint   Patient presents with    Hypertension       Nurses Notes reviewed and I agree except as noted in the HPI. HISTORY OF PRESENT ILLNESS    Billie Morales is a 66 y.o. male who presents to the Emergency Department for the evaluation of HTN and HA. Pt was seen in this ER for same complaints on 4/8 and was diagnosed with Hypertensive urgency and had neg CTH and work up but pt refused admission at the time. He reports HA has slowly worsened since Monday and has been constant. He reports compliance with his antihypertensives. Reports associated lightheadedness, SOB and LE edema at baseline in last few weeks but denies CP, vision changes, tinnitus, abdominal pain, fever, chills, n/v, or changes in bowel or urinary habits. The HPI was provided by the patient. REVIEW OF SYSTEMS     Review of Systems   Constitutional: Negative for activity change, appetite change, chills and fever. HENT: Negative for congestion, rhinorrhea and sore throat. Eyes: Negative for visual disturbance. Respiratory: Positive for shortness of breath. Negative for cough and wheezing. Cardiovascular: Positive for leg swelling. Negative for chest pain. Gastrointestinal: Negative for abdominal pain, constipation, diarrhea, nausea and vomiting. Endocrine: Negative for polyuria. Genitourinary: Negative for dysuria, frequency, hematuria and urgency. Musculoskeletal: Negative for neck pain and neck stiffness. Skin: Positive for rash. Neurological: Positive for light-headedness and headaches. Negative for syncope, weakness and numbness. PAST MEDICAL HISTORY    has a past medical history of Arthritis, Back problem, Constipation, Hypertension, Sleep apnea, and Thyroid disease. SURGICAL HISTORY    has a past surgical history that includes Appendectomy; knee surgery (Right, 1980's);  Carpal tunnel release (Bilateral); Colonoscopy (2017); hernia repair; and pr exploratory of abdomen (N/A, 2/5/2018). CURRENT MEDICATIONS       Previous Medications    ALBUTEROL (PROVENTIL HFA;VENTOLIN HFA) 108 (90 BASE) MCG/ACT INHALER    Inhale 1 puff into the lungs every 6 hours as needed for Wheezing. AMLODIPINE (NORVASC) 10 MG TABLET    Take 10 mg by mouth daily    ASPIRIN 81 MG TABLET    Take 81 mg by mouth daily    ATENOLOL (TENORMIN) 100 MG TABLET    Take 1 tablet by mouth daily    AZELASTINE (ASTELIN) 137 MCG/SPRAY NASAL SPRAY    1 spray by Nasal route as needed. Use in each nostril as directed    BISMUTH SUBSALICYLATE (PEPTO-BISMOL PO)    Take by mouth as needed PATIENT STATES THAT HE'S UP TO 1 BOTTLE WEEKLY    CLONIDINE (CATAPRES) 0.2 MG TABLET    Take 0.2 mg by mouth 3 times daily    CYCLOBENZAPRINE (FLEXERIL) 10 MG TABLET    Take 10 mg by mouth 3 times daily as needed for Muscle spasms    LEVOTHYROXINE (SYNTHROID) 175 MCG TABLET    Take 175 mcg by mouth Daily    OXYBUTYNIN (DITROPAN) 5 MG TABLET    Take 1 tablet by mouth 3 times daily    OXYCODONE-ACETAMINOPHEN (PERCOCET)  MG PER TABLET    Take 1 tablet by mouth every 6 hours as needed for Pain. PANTOPRAZOLE (PROTONIX) 40 MG TABLET    Take 1 tablet by mouth every morning (before breakfast)    POLYETHYLENE GLYCOL (GLYCOLAX) POWDER    Take 17 g by mouth daily as needed     POTASSIUM CHLORIDE (KLOR-CON M) 20 MEQ EXTENDED RELEASE TABLET    Take 2 tablets by mouth 2 times daily    ROPINIROLE (REQUIP) 0.25 MG TABLET    Take 1 tablet by mouth nightly take 1 tablet by mouth 2 hour before sleep daily for 5 days then take 2 tablets by mouth 2 hour before sleep    SPIRONOLACTONE (ALDACTONE) 50 MG TABLET    Take 1 tablet by mouth daily    TAMSULOSIN (FLOMAX) 0.4 MG CAPSULE    Take 2 capsules by mouth nightly    ZOLPIDEM (AMBIEN CR) 12.5 MG CR TABLET    Take 12.5 mg by mouth nightly as needed.        ALLERGIES     is allergic to shellfish-derived products; pcn [penicillins]; succinylcholine; morphine; and tape [adhesive tape]. FAMILY HISTORY     indicated that his mother is . He indicated that his father is . He indicated that the status of his brother is unknown. He indicated that the status of his paternal grandmother is unknown. He indicated that the status of his neg hx is unknown.   family history includes Cancer in his father and mother; Diabetes in his paternal grandmother; Heart Disease in his father; Stroke in his brother. SOCIAL HISTORY      reports that he has never smoked. His smokeless tobacco use includes snuff. He reports that he does not drink alcohol or use drugs. PHYSICAL EXAM     INITIAL VITALS:  weight is 352 lb (159.7 kg) (abnormal). His oral temperature is 98.7 °F (37.1 °C). His blood pressure is 195/95 (abnormal) and his pulse is 66. His respiration is 20 and oxygen saturation is 94%. Physical Exam   Constitutional: He is oriented to person, place, and time. He appears well-developed and well-nourished. No distress. Patient is alert and oriented x4, does not appear to be in acute distress, lying comfortably in bed, obese appearing male   HENT:   Head: Normocephalic and atraumatic. Eyes: Pupils are equal, round, and reactive to light. Conjunctivae and EOM are normal. Right eye exhibits no discharge. Left eye exhibits no discharge. Neck: Normal range of motion. Neck supple. Cardiovascular: Normal rate, regular rhythm, normal heart sounds and intact distal pulses. Exam reveals no gallop and no friction rub. No murmur heard. Pulmonary/Chest: Effort normal and breath sounds normal. No stridor. No respiratory distress. He has no wheezes. He has no rales. He exhibits no tenderness. Abdominal: Soft. Bowel sounds are normal. He exhibits no distension and no mass. There is no tenderness. There is no rebound and no guarding. No hernia. Musculoskeletal: Normal range of motion. He exhibits no edema, tenderness or deformity. Neurological: He is alert and oriented to person, place, and time. Cranial nerves II-12 intact, no sensory deficits, 5 out of 5 motor strength in all extremities, no pronator drift, finger-to-nose intact, heel-to-shin intact, A&O x3   Skin: Skin is warm and dry. He is not diaphoretic. DIFFERENTIAL DIAGNOSIS:   Hypertensive urgency vs emergency, MI, cardiac dysrhythmia, medication noncompliance, SAH, migraine HA, tension HA, cluster HA     DIAGNOSTIC RESULTS     EKG: All EKG's are interpreted by the Emergency Department Physician who either signs or Co-signs this chart in the absence of a cardiologist.  EKG interpreted by Rin Weiner MD:    EKG Interpretation    Interpreted by emergency department physician    Rhythm: normal sinus   Rate: normal  Axis: left  Ectopy: none  Conduction: right bundle branch block (incomplete)  ST Segments: nonspecific changes  T Waves: flattening in  lateral leads and inversion in  III and aVf  Q Waves: none    Clinical Impression: Abnormal EKG    Amy Garsia      RADIOLOGY: non-plain film images(s) such as CT, Ultrasound and MRI are read by the radiologist.    XR CHEST STANDARD (2 VW)   Final Result      Possible very small infiltrate or atelectasis at the medial right base. Mild to moderate cardiomegaly. **This report has been created using voice recognition software. It may contain minor errors which are inherent in voice recognition technology. **      Final report electronically signed by Dr. Martha Brown on 4/11/2019 8:47 PM           LABS:   Labs Reviewed   CBC WITH AUTO DIFFERENTIAL - Abnormal; Notable for the following components:       Result Value    RDW-CV 14.6 (*)     All other components within normal limits   BASIC METABOLIC PANEL - Abnormal; Notable for the following components:    Potassium 3.4 (*)     Glucose 134 (*)     All other components within normal limits   TSH WITH REFLEX - Abnormal; Notable for the following components:    TSH 8.210 (*)     All other components within normal limits   OSMOLALITY - Abnormal; Notable for the following components:    Osmolality Calc 273.7 (*)     All other components within normal limits   GLOMERULAR FILTRATION RATE, ESTIMATED - Abnormal; Notable for the following components:    Est, Glom Filt Rate 87 (*)     All other components within normal limits   TROPONIN   BRAIN NATRIURETIC PEPTIDE   ANION GAP   T4, FREE   TROPONIN       EMERGENCY DEPARTMENT COURSE:   Vitals:    Vitals:    04/11/19 2011 04/11/19 2021 04/11/19 2123 04/11/19 2126   BP: (!) 178/95  (!) 195/95    Pulse:  64  66   Resp:  22  20   Temp:       TempSrc:       SpO2:  94%  94%   Weight:           9:43 PM: The patient was seen and evaluated. MDM:  65 yo M recently seen for hypertensive urgency represents with similar complaints of HTN and HA. No neuro symptoms. Recent work up included neg [address] but pt did not was to be admitted at that time. Pt noted to be hypertensive here at 187/98. Neuro intact, rest of PE benign except for noted obesity. Cardiac work up so far unremarkable including neg trop. I did not feel it necessary to repeat CTH today given benign neuro exam. Pt given his home dose of catapres as well as migraine cocktail without significant improvement in HA or BP. Pt then given hydralazine. Hospitalist consulted and stated she will come see patient for admission. Pt updated on results and plan for admission and is in agreement. CRITICAL CARE:   none    CONSULTS:  Hospitalist    PROCEDURES:  None     FINAL IMPRESSION      1.  Hypertensive urgency          DISPOSITION/PLAN   Admit    PATIENT REFERRED TO:  DO Neha Contreras [facility] [address]  [address]            DISCHARGE MEDICATIONS:  New Prescriptions    No medications on file       (Please note that portions of this note were completed with a voice recognition program.  Efforts were made to edit the dictations but occasionally words are mis-transcribed.)    Provider:  I personally performed the services described in the documentation, reviewed and edited the documentation which was dictated to the scribe in my presence, and it accurately records my words and actions.     Adeline Roy MD 4/11/19 9:43 PM        Adeline Roy MD  Resident  04/11/19 0604

## 2019-04-12 NOTE — H&P
HOSPITALIST ADMISSION H&P      REASON FOR ADMISSION:  Hypertensive emergency   ESTIMATED LENGTH OF STAY:2-3 days    ATTENDING/ADMITTING PHYSICIAN: Karina Ferreira PA-C  PCP: Frantz Enriquez DO    HISTORY OF PRESENT ILLNESS:      The patient is a 66 y.o. male patient of Frantz Enriquez DO who presents with high blood pressure and a headache. The patient states he had a headache that wouldn't go away and took his blood pressure which was quite high at home. He had taken all of his scheduled doses of blood pressure medicine. The patient denied any weakness, no visual changes, no numbness or tingling. See below for additional PMH. Patient nslt-bywvysovnv-wbkahmno-available records reviewed, including, but not limited to,       Past Medical History:   Diagnosis Date    Arthritis     Back problem     Constipation     Hypertension     Sleep apnea     has CPAP    Thyroid disease            Past Surgical History:   Procedure Laterality Date    APPENDECTOMY      CARPAL TUNNEL RELEASE Bilateral     COLONOSCOPY  2017    Dr. Jalen Diop      x3 surgeries with 14 repairs    KNEE SURGERY Right 1980's    cartilage    LA EXPLORATORY OF ABDOMEN N/A 2/5/2018    ABDOMINAL EXPLORATION WITH LYSIS OF COMPLICATED ADHESIONS WITH AN EXTENDED RIGHT COLON RESECTION performed by Salomon Malin MD at [place] [facility]       Medications Prior to Admission:    Not in a hospital admission. Allergies:    Shellfish-derived products; Pcn [penicillins]; Succinylcholine; Morphine; and Tape [adhesive tape]    Social History:    reports that he has never smoked. His smokeless tobacco use includes snuff. He reports that he does not drink alcohol or use drugs. Family History:   family history includes Cancer in his father and mother; Diabetes in his paternal grandmother; Heart Disease in his father; Stroke in his brother.     REVIEW OF SYSTEMS:  See HPI and problem list; otherwise no other new complaints with respect to HEENT, neck, pulmonary, coronary, GI, , endocrine, musculoskeletal, immune system/connective tissue disease, hematologic, neuropsych, skin, lymphatics, or malignancies. PHYSICAL EXAM:  Vitals:  BP (!) 195/95   Pulse 64   Temp 98.7 °F (37.1 °C) (Oral)   Resp 22   Wt (!) 352 lb (159.7 kg)   SpO2 94%   BMI 46.44 kg/m²     HEENT: PERRLA, Mucosa Pink, Moist, EMOI, Normocephalic and Atraumatic  Neck: Supple, Carotid Pulses Present, No Bruits, No Masses, Tenderness, Nodularity and No Lymphadenopathy  Chest/Lungs: Clear to Auscultation without Rales, Rhonchi, or Wheezes  Cardiac: Regular Rate and Rhythm without Rubs, Clicks, Gallops, or Murmurs  GI/Abdomen:  Bowel Sounds Present, Soft, Non-tender, without Guarding or Rebound Tenderness, No Masses and No Tenderness  : Not examined  EXT/Skin: No Edema, No Cyanosis, No Clubbing and Normal Skin Turgor  Neuro: Alert and Oriented, to Person, to Time, to Place, to Situation, No Localizing Signs/Symptoms, Cranial Nerves II-XII Grossly Intact, Sensory Grossly Intact and Motor Sensory Intact        LABS:    Recent Results (from the past 168 hour(s))   EKG Emergency    Collection Time: 04/08/19  5:32 PM   Result Value Ref Range    Ventricular Rate 67 BPM    Atrial Rate 67 BPM    P-R Interval 188 ms    QRS Duration 104 ms    Q-T Interval 446 ms    QTc Calculation (Bazett) 471 ms    P Axis 40 degrees    R Axis -30 degrees    T Axis 2 degrees   CBC auto differential    Collection Time: 04/08/19  5:38 PM   Result Value Ref Range    WBC 8.9 4.8 - 10.8 thou/mm3    RBC 5.51 4.70 - 6.10 mill/mm3    Hemoglobin 15.7 14.0 - 18.0 gm/dl    Hematocrit 47.1 42.0 - 52.0 %    MCV 85.5 80.0 - 94.0 fL    MCH 28.5 26.0 - 33.0 pg    MCHC 33.3 32.2 - 35.5 gm/dl    RDW-CV 14.5 11.5 - 14.5 %    RDW-SD 44.2 35.0 - 45.0 fL    Platelets 522 755 - 539 thou/mm3    MPV 9.5 9.4 - 12.4 fL    Seg Neutrophils 75.2 %    Lymphocytes 11.5 %    Monocytes 9.5 %    Eosinophils 2.6 %    Basophils 0.7 % Immature Granulocytes 0.5 %    Segs Absolute 6.7 1.8 - 7.7 thou/mm3    Lymphocytes # 1.0 1.0 - 4.8 thou/mm3    Monocytes # 0.8 0.4 - 1.3 thou/mm3    Eosinophils # 0.2 0.0 - 0.4 thou/mm3    Basophils # 0.1 0.0 - 0.1 thou/mm3    Immature Grans (Abs) 0.04 0.00 - 0.07 thou/mm3    nRBC 0 /100 wbc   Comprehensive Metabolic Panel    Collection Time: 04/08/19  5:38 PM   Result Value Ref Range    Glucose 140 (H) 70 - 108 mg/dL    CREATININE 0.9 0.4 - 1.2 mg/dL    BUN 12 7 - 22 mg/dL    Sodium 143 135 - 145 meq/L    Potassium 3.7 3.5 - 5.2 meq/L    Chloride 103 98 - 111 meq/L    CO2 27 23 - 33 meq/L    Calcium 9.5 8.5 - 10.5 mg/dL    AST 28 5 - 40 U/L    Alkaline Phosphatase 87 38 - 126 U/L    Total Protein 8.0 6.1 - 8.0 g/dL    Alb 4.2 3.5 - 5.1 g/dL    Total Bilirubin 0.6 0.3 - 1.2 mg/dL    ALT 33 11 - 66 U/L   Troponin    Collection Time: 04/08/19  5:38 PM   Result Value Ref Range    Troponin T < 0.010 ng/ml   TSH without Reflex    Collection Time: 04/08/19  5:38 PM   Result Value Ref Range    TSH 4.120 0.400 - 4.20 uIU/mL   Magnesium    Collection Time: 04/08/19  5:38 PM   Result Value Ref Range    Magnesium 1.9 1.6 - 2.4 mg/dL   Brain Natriuretic Peptide    Collection Time: 04/08/19  5:38 PM   Result Value Ref Range    Pro-.5 (H) 0.0 - 900.0 pg/mL   Anion Gap    Collection Time: 04/08/19  5:38 PM   Result Value Ref Range    Anion Gap 13.0 8.0 - 16.0 meq/L   Glomerular Filtration Rate, Estimated    Collection Time: 04/08/19  5:38 PM   Result Value Ref Range    Est, Glom Filt Rate 87 (A) ml/min/1.73m2   Osmolality    Collection Time: 04/08/19  5:38 PM   Result Value Ref Range    Osmolality Calc 287.0 275.0 - 300 mOsmol/kg   EKG 12 Lead    Collection Time: 04/11/19  7:13 PM   Result Value Ref Range    Ventricular Rate 64 BPM    Atrial Rate 64 BPM    P-R Interval 170 ms    QRS Duration 108 ms    Q-T Interval 460 ms    QTc Calculation (Bazett) 474 ms    P Axis 41 degrees    R Axis -34 degrees    T Axis 8 degrees   CBC Auto Differential    Collection Time: 04/11/19  7:49 PM   Result Value Ref Range    WBC 8.1 4.8 - 10.8 thou/mm3    RBC 5.04 4.70 - 6.10 mill/mm3    Hemoglobin 14.3 14.0 - 18.0 gm/dl    Hematocrit 43.2 42.0 - 52.0 %    MCV 85.7 80.0 - 94.0 fL    MCH 28.4 26.0 - 33.0 pg    MCHC 33.1 32.2 - 35.5 gm/dl    RDW-CV 14.6 (H) 11.5 - 14.5 %    RDW-SD 44.8 35.0 - 45.0 fL    Platelets 607 393 - 561 thou/mm3    MPV 9.4 9.4 - 12.4 fL    Seg Neutrophils 68.9 %    Lymphocytes 14.8 %    Monocytes 11.0 %    Eosinophils 3.9 %    Basophils 1.0 %    Immature Granulocytes 0.4 %    Segs Absolute 5.6 1.8 - 7.7 thou/mm3    Lymphocytes # 1.2 1.0 - 4.8 thou/mm3    Monocytes # 0.9 0.4 - 1.3 thou/mm3    Eosinophils # 0.3 0.0 - 0.4 thou/mm3    Basophils # 0.1 0.0 - 0.1 thou/mm3    Immature Grans (Abs) 0.03 0.00 - 0.07 thou/mm3    nRBC 0 /100 wbc   Basic Metabolic Panel    Collection Time: 04/11/19  7:49 PM   Result Value Ref Range    Sodium 136 135 - 145 meq/L    Potassium 3.4 (L) 3.5 - 5.2 meq/L    Chloride 103 98 - 111 meq/L    CO2 23 23 - 33 meq/L    Glucose 134 (H) 70 - 108 mg/dL    BUN 12 7 - 22 mg/dL    CREATININE 0.9 0.4 - 1.2 mg/dL    Calcium 9.0 8.5 - 10.5 mg/dL   Troponin    Collection Time: 04/11/19  7:49 PM   Result Value Ref Range    Troponin T < 0.010 ng/ml   Brain Natriuretic Peptide    Collection Time: 04/11/19  7:49 PM   Result Value Ref Range    Pro-.1 0.0 - 900.0 pg/mL   TSH with Reflex    Collection Time: 04/11/19  7:49 PM   Result Value Ref Range    TSH 8.210 (H) 0.400 - 4.20 uIU/mL   Anion Gap    Collection Time: 04/11/19  7:49 PM   Result Value Ref Range    Anion Gap 10.0 8.0 - 16.0 meq/L   Osmolality    Collection Time: 04/11/19  7:49 PM   Result Value Ref Range    Osmolality Calc 273.7 (L) 275.0 - 300 mOsmol/kg   Glomerular Filtration Rate, Estimated    Collection Time: 04/11/19  7:49 PM   Result Value Ref Range    Est, Glom Filt Rate 87 (A) ml/min/1.73m2   T4, Free    Collection Time: 04/11/19  7:49 PM   Result Value Ref Range    T4 Free 1.17 0.93 - 1.76 ng/dL         ASSESSMENT:      Patient Active Problem List   Diagnosis    Allergy to bee sting    Obesity, Class III, BMI 40-49.9 (morbid obesity) (HCC)    Weight gain    Essential hypertension    Rheumatoid arteritis    Hypothyroidism    Gastroenteritis    Obstructive sleep apnea on CPAP    Small intestinal bacterial overgrowth    Acute diarrhea    Generalized abdominal pain    Nausea and vomiting    Leukocytosis    Hepatomegaly    Ileus (HCC)    Low grade fever    Hypokalemia    S/P partial resection of colon    S/P laparotomy    Acute kidney failure with tubular necrosis (HCC)    Hypernatremia    Serum potassium decreased    Hypertensive emergency       PLAN:    1. Hypertensive emergency - patient's blood pressure still high after ED intervention, start nicardipine, renal artery US, tele, electrolyte management  2. LAURIE - CPAP at home settings  3.  Hypothyroidism - TSH high, outpatient follow up for recheck of dose correction of synthroid    Home medications reviewed  See orders     Note that over 50 minutes was spent in evaluation of the patient, review of the chart and pertinent records, discussion with family/staff, etc    Sierra Vogel PA-C  9:26 PM  4/11/2019

## 2019-04-12 NOTE — ED NOTES
Patient resting in bed with even and unlabored respirations, Medications administered per orders. Vitals obtained. Patient denies any other needs at this time.        Beba Xiong RN  04/11/19 2022

## 2019-04-12 NOTE — PROGRESS NOTES
Patient arrived per cart to 3B. Heart monitor applied and vitals taken. Admission paperwork completed. Explained to patient that [facility] is not responsible for any lost or stolen items. Patient verbalized understanding. Oriented to room and use of call light and bed controls. Patient denies pain or needs. No signs of distress noted. Bed locked & in low position, side-rails up x2. Call light in reach. Reminded patient to call nurse if any needs arise.

## 2019-04-12 NOTE — ED NOTES
Patient ambulated to restroom without assistance with an even and steady gait. Vitals obtained. Medications administered per orders. Patient still complaining of am 8/10 headache at this time. No other complaints noted. Call light within reach, will continue to monitor.       Jeff Elliott RN  04/11/19 [id number]

## 2019-04-12 NOTE — CARE COORDINATION
19, 3:13 PM      Ford Myles       Admitted from: ED 2019/ [facility] day: 1   Location: [address] [place] [address]A Reason for admit: Hypertensive emergency [I16.1] Status: IP  Admit order signed?: yes  PMH:  has a past medical history of Arthritis, Back problem, Constipation, Hypertension, Sleep apnea, and Thyroid disease. Procedure: None  Pertinent abnormal Imagin - Possible very small infiltrate or atelectasis at the medial right base. Mild to moderate cardiomegaly. Medications:  Scheduled Meds:   amLODIPine  10 mg Oral Daily    aspirin  81 mg Oral Daily    atenolol  100 mg Oral Daily    azelastine  1 spray Each Nare BID    cloNIDine  0.2 mg Oral TID    oxybutynin  5 mg Oral TID    pantoprazole  40 mg Oral QAM AC    rOPINIRole  0.25 mg Oral Nightly    spironolactone  50 mg Oral Daily    tamsulosin  0.8 mg Oral Nightly    sodium chloride flush  10 mL Intravenous 2 times per day    nicotine  1 patch Transdermal Daily    levothyroxine  200 mcg Oral Daily    enoxaparin  40 mg Subcutaneous Q12H     Continuous Infusions:   [Held by provider] niCARdipine in sodium chloride        Pertinent Info/Orders/Treatment Plan:  Pt admitted through ED with elevated BP and headache. SBP was over 200. Troponin levels negative. Cardene gtt was to be started but was placed on hold as BP improved. Diet: DIET CARDIAC;   Smoking status:  reports that he has never smoked. His smokeless tobacco use includes snuff.    PCP: Denisse Riddle DO  Readmission: No  Readmission Risk Score: 13%    Discharge Planning  Current Residence:  Private Residence  Living Arrangements:  Spouse/Significant Other   Support Systems:  Spouse/Significant Other  Current Services PTA:     Potential Assistance Needed:  N/A  Potential Assistance Purchasing Medications:  No  Does patient want to participate in local refill/ meds to beds program?  No  Type of Home Care Services:  None  Patient expects to be discharged to:  Home  Expected Discharge date:  04/13/19  Follow Up Appointment: Best Day/ Time:      Discharge Plan: Pt lives at home with spouse and plans to return there at discharge. Pt still drives and can get to appointments. He does not have trouble getting his medications. He has a CPAP and a BP monitor at home. He has never used HH, and denies the need for any assistance at home.       Evaluation: no

## 2019-04-13 VITALS
TEMPERATURE: 97.8 F | OXYGEN SATURATION: 95 % | DIASTOLIC BLOOD PRESSURE: 91 MMHG | BODY MASS INDEX: 41.75 KG/M2 | HEART RATE: 75 BPM | SYSTOLIC BLOOD PRESSURE: 179 MMHG | RESPIRATION RATE: 18 BRPM | HEIGHT: 73 IN | WEIGHT: 315 LBS

## 2019-04-13 PROBLEM — I16.0 HYPERTENSIVE URGENCY: Status: ACTIVE | Noted: 2019-04-13

## 2019-04-13 PROBLEM — G44.89 OTHER HEADACHE SYNDROME: Status: ACTIVE | Noted: 2019-04-13

## 2019-04-13 LAB
ANION GAP SERPL CALCULATED.3IONS-SCNC: 16 MEQ/L (ref 8–16)
AVERAGE GLUCOSE: 141 MG/DL (ref 70–126)
BUN BLDV-MCNC: 20 MG/DL (ref 7–22)
CALCIUM SERPL-MCNC: 8.6 MG/DL (ref 8.5–10.5)
CHLORIDE BLD-SCNC: 104 MEQ/L (ref 98–111)
CO2: 24 MEQ/L (ref 23–33)
CREAT SERPL-MCNC: 1.2 MG/DL (ref 0.4–1.2)
EKG ATRIAL RATE: 64 BPM
EKG P AXIS: 41 DEGREES
EKG P-R INTERVAL: 170 MS
EKG Q-T INTERVAL: 460 MS
EKG QRS DURATION: 108 MS
EKG QTC CALCULATION (BAZETT): 474 MS
EKG R AXIS: -34 DEGREES
EKG T AXIS: 8 DEGREES
EKG VENTRICULAR RATE: 64 BPM
GFR SERPL CREATININE-BSD FRML MDRD: 63 ML/MIN/1.73M2
GLUCOSE BLD-MCNC: 120 MG/DL (ref 70–108)
HBA1C MFR BLD: 6.7 % (ref 4.4–6.4)
POTASSIUM SERPL-SCNC: 3.5 MEQ/L (ref 3.5–5.2)
SODIUM BLD-SCNC: 144 MEQ/L (ref 135–145)

## 2019-04-13 PROCEDURE — 83036 HEMOGLOBIN GLYCOSYLATED A1C: CPT

## 2019-04-13 PROCEDURE — 2709999900 HC NON-CHARGEABLE SUPPLY

## 2019-04-13 PROCEDURE — 6370000000 HC RX 637 (ALT 250 FOR IP): Performed by: INTERNAL MEDICINE

## 2019-04-13 PROCEDURE — 6370000000 HC RX 637 (ALT 250 FOR IP): Performed by: PHYSICIAN ASSISTANT

## 2019-04-13 PROCEDURE — 94660 CPAP INITIATION&MGMT: CPT

## 2019-04-13 PROCEDURE — 2700000000 HC OXYGEN THERAPY PER DAY

## 2019-04-13 PROCEDURE — 93010 ELECTROCARDIOGRAM REPORT: CPT | Performed by: INTERNAL MEDICINE

## 2019-04-13 PROCEDURE — 83835 ASSAY OF METANEPHRINES: CPT

## 2019-04-13 PROCEDURE — 80048 BASIC METABOLIC PNL TOTAL CA: CPT

## 2019-04-13 PROCEDURE — 82384 ASSAY THREE CATECHOLAMINES: CPT

## 2019-04-13 PROCEDURE — 36415 COLL VENOUS BLD VENIPUNCTURE: CPT

## 2019-04-13 PROCEDURE — 94760 N-INVAS EAR/PLS OXIMETRY 1: CPT

## 2019-04-13 PROCEDURE — 99238 HOSP IP/OBS DSCHRG MGMT 30/<: CPT | Performed by: INTERNAL MEDICINE

## 2019-04-13 RX ORDER — NICOTINE 21 MG/24HR
1 PATCH, TRANSDERMAL 24 HOURS TRANSDERMAL DAILY
Qty: 30 PATCH | Refills: 3 | Status: SHIPPED | OUTPATIENT
Start: 2019-04-13 | End: 2019-07-02

## 2019-04-13 RX ORDER — HYDRALAZINE HYDROCHLORIDE 50 MG/1
50 TABLET, FILM COATED ORAL EVERY 8 HOURS SCHEDULED
Qty: 90 TABLET | Refills: 3 | Status: SHIPPED | OUTPATIENT
Start: 2019-04-13 | End: 2020-01-08 | Stop reason: SDUPTHER

## 2019-04-13 RX ORDER — ACETAMINOPHEN 500 MG
500 TABLET ORAL EVERY 6 HOURS PRN
Qty: 40 TABLET | Refills: 0 | Status: SHIPPED | OUTPATIENT
Start: 2019-04-13 | End: 2022-03-16 | Stop reason: ALTCHOICE

## 2019-04-13 RX ADMIN — AZELASTINE HYDROCHLORIDE 1 SPRAY: 137 SPRAY, METERED NASAL at 08:27

## 2019-04-13 RX ADMIN — OXYBUTYNIN CHLORIDE 5 MG: 5 TABLET ORAL at 08:28

## 2019-04-13 RX ADMIN — AMLODIPINE BESYLATE 10 MG: 10 TABLET ORAL at 08:27

## 2019-04-13 RX ADMIN — SPIRONOLACTONE 50 MG: 25 TABLET ORAL at 08:27

## 2019-04-13 RX ADMIN — HYDRALAZINE HYDROCHLORIDE 50 MG: 50 TABLET ORAL at 05:43

## 2019-04-13 RX ADMIN — ASPIRIN 81 MG: 81 TABLET, COATED ORAL at 08:27

## 2019-04-13 RX ADMIN — CLONIDINE HYDROCHLORIDE 0.2 MG: 0.2 TABLET ORAL at 08:27

## 2019-04-13 RX ADMIN — PANTOPRAZOLE SODIUM 40 MG: 40 TABLET, DELAYED RELEASE ORAL at 05:43

## 2019-04-13 RX ADMIN — HYDRALAZINE HYDROCHLORIDE 50 MG: 50 TABLET ORAL at 00:39

## 2019-04-13 RX ADMIN — OXYCODONE HYDROCHLORIDE AND ACETAMINOPHEN 1 TABLET: 10; 325 TABLET ORAL at 05:43

## 2019-04-13 RX ADMIN — ATENOLOL 100 MG: 100 TABLET ORAL at 08:27

## 2019-04-13 RX ADMIN — CYCLOBENZAPRINE HYDROCHLORIDE 10 MG: 10 TABLET, FILM COATED ORAL at 00:43

## 2019-04-13 RX ADMIN — LEVOTHYROXINE SODIUM 200 MCG: 100 TABLET ORAL at 05:43

## 2019-04-13 ASSESSMENT — PAIN SCALES - GENERAL
PAINLEVEL_OUTOF10: 8
PAINLEVEL_OUTOF10: 0

## 2019-04-13 ASSESSMENT — PAIN DESCRIPTION - FREQUENCY: FREQUENCY: CONTINUOUS

## 2019-04-13 ASSESSMENT — PAIN DESCRIPTION - ORIENTATION: ORIENTATION: LOWER;RIGHT

## 2019-04-13 ASSESSMENT — PAIN DESCRIPTION - LOCATION: LOCATION: BACK

## 2019-04-13 ASSESSMENT — PAIN DESCRIPTION - DESCRIPTORS: DESCRIPTORS: ACHING;CONSTANT;DISCOMFORT;DULL

## 2019-04-13 ASSESSMENT — PAIN - FUNCTIONAL ASSESSMENT: PAIN_FUNCTIONAL_ASSESSMENT: ACTIVITIES ARE NOT PREVENTED

## 2019-04-13 ASSESSMENT — PAIN DESCRIPTION - PROGRESSION: CLINICAL_PROGRESSION: NOT CHANGED

## 2019-04-13 ASSESSMENT — PAIN DESCRIPTION - PAIN TYPE: TYPE: CHRONIC PAIN

## 2019-04-13 ASSESSMENT — PAIN DESCRIPTION - ONSET: ONSET: ON-GOING

## 2019-04-13 NOTE — PROGRESS NOTES
Hospitalist Progress Note    Patient:  Radha Denise      Unit/Bed:3B-35/035-A    YOB: 1960    MRN: [id number]       Acct: [de-identified]     PCP: Julieth Simon DO    Date of Admission: 4/11/2019      Assessment/Plan:  Active Hospital Problems    Diagnosis Date Noted    Hypertensive emergency [I16.1] 04/11/2019       HTN Urgency - chronically has been difficult to control, did not require cardene drip as his BP improved with IV hydralazine. In the 200s on arrival, a/w headache. No evidence of ischemic heart damage, AMS, pulm edema, or renal disease. Denies any missed doses. - Add on hydralazine 50mg TID to home regimen (was only on 50 BID)  - continue clinicdine 0.2 TID, amlodipine 10mg, atenolol, and spironolactone 50mg  - dc in AM if bp remains controlled  - unclear etiology, however with chronic hypokalemia, ? Hyperaldo?  - cannot test at this time given aldactone use  - will obtain plasma metanephrines in am given a/w headache  - TSH grossly normal  - ensure PAP is properly titrated (is compliant)    Headache - resolved. Migranous vs tension/HTN related. Toradol, benadryl, reglan given in ED. CT head on 4/8 wnl. Subclinical hypothyroidism - tsh 8.2 - consider repeat outpatient. Hypokalemia - replacement protocol  - repeat in am    Back Pain - chronic issue, cont home lidoderm    LAURIE - compliant. Expected discharge date:  1 day    Disposition:          [x] Home       [] TCU       [] Rehab       [] Psych       [] SNF       [] [facility]       [] Other-    --------------------------------------------    Chief Complaint: Headache, HTN    Hospital Course: Patient is 64yo M with difficult to control HTN, who was admitted for hypertensive urgency. Of note, he was seen on 4/8 in ED for similar issues, and was discharged home after CT head for HA showed no bleed or acute findings (however admission was recommended by ED staff).       Subjective (past 24 hours): Patient is feeling better, does not know what may have lead to his elevated BP. No missed doses, no vomiting, no drug use or withdrawal.  Denies excessive caffeine use or tobacco use. No chest pain, nausea, or severe pain. Medications:  Reviewed    Infusion Medications    [Held by provider] niCARdipine in sodium chloride       Scheduled Medications    hydrALAZINE  50 mg Oral 3 times per day    amLODIPine  10 mg Oral Daily    aspirin  81 mg Oral Daily    atenolol  100 mg Oral Daily    azelastine  1 spray Each Nare BID    cloNIDine  0.2 mg Oral TID    oxybutynin  5 mg Oral TID    pantoprazole  40 mg Oral QAM AC    rOPINIRole  0.25 mg Oral Nightly    spironolactone  50 mg Oral Daily    tamsulosin  0.8 mg Oral Nightly    sodium chloride flush  10 mL Intravenous 2 times per day    nicotine  1 patch Transdermal Daily    levothyroxine  200 mcg Oral Daily    enoxaparin  40 mg Subcutaneous Q12H     PRN Meds: cyclobenzaprine, oxyCODONE-acetaminophen, polyethylene glycol, sodium chloride flush, magnesium hydroxide, ondansetron, acetaminophen, zolpidem, potassium chloride **OR** potassium alternative oral replacement **OR** potassium chloride      Intake/Output Summary (Last 24 hours) at 4/12/2019 2219  Last data filed at 4/12/2019 2052  Gross per 24 hour   Intake 110 ml   Output --   Net 110 ml     Weight change:       Exam:  BP (!) 184/94   Pulse 65   Temp 97.8 °F (36.6 °C) (Oral)   Resp 20   Ht 6' 1\" (1.854 m)   Wt (!) 354 lb 3 oz (160.7 kg)   SpO2 94%   BMI 46.73 kg/m²     General appearance: No apparent distress, well developed, appears stated age. Eyes:  Pupils equal, round, and reactive to light. Conjunctivae/corneas clear. HENT: Head normal in appearance. External nares normal.  Oral mucosa moist without lesions. Hearing grossly intact. Neck: Supple, with full range of motion. No jugular venous distention. Trachea midline. Respiratory:  Normal respiratory effort.  Clear to auscultation, bilaterally without rales or wheezes or Rhonchi. Cardiovascular: Normal rate, regular rhythm with normal S1/S2 without murmurs. No lower extremity edema. Abdomen: Soft, non-tender, non-distended with normal bowel sounds. Musculoskeletal: Normal range of motion in extremities. There is no joint swelling or tenderness. Skin: Skin color, texture, turgor normal.  No rashes or lesions. Neurologic:  Neurovascularly intact without any focal sensory/motor deficits in the upper and lower extremities. Cranial nerves:  grossly non-focal.  Psychiatric: Alert and oriented, thought content appropriate, normal insight. Capillary Refill: Brisk,< 3 seconds. Peripheral Pulses: +2 palpable, equal bilaterally. Labs:   Recent Labs     04/11/19 1949 04/12/19  0441   WBC 8.1 7.1   HGB 14.3 14.6   HCT 43.2 44.5    259     Recent Labs     04/11/19 1949 04/12/19 0440    140   K 3.4* 3.6    103   CO2 23 24   BUN 12 12   CREATININE 0.9 0.9   CALCIUM 9.0 8.7     No results for input(s): AST, ALT, BILIDIR, BILITOT, ALKPHOS in the last 72 hours. No results for input(s): INR in the last 72 hours. No results for input(s): Eyvonne Ape in the last 72 hours. Microbiology:      Urinalysis:      Lab Results   Component Value Date    NITRU Negative 07/24/2018    WBCUA 15-25 02/07/2018    BACTERIA FEW 02/07/2018    RBCUA 3-5 02/07/2018    BLOODU Negative 07/24/2018    BLOODU MODERATE 02/07/2018    GLUCOSEU Negative 07/24/2018       Radiology:  XR CHEST STANDARD (2 VW)   Final Result      Possible very small infiltrate or atelectasis at the medial right base. Mild to moderate cardiomegaly. **This report has been created using voice recognition software. It may contain minor errors which are inherent in voice recognition technology. **      Final report electronically signed by Dr. Kimber Engle on 4/11/2019 8:47 PM          DVT prophylaxis: [x] Lovenox *

## 2019-04-13 NOTE — DISCHARGE SUMMARY
Hospital Medicine Discharge Summary      Patient Identification:   Juana Skinner   : 1960  MRN: [id number]   Account: [de-identified]      Patient's PCP: Kim Brooks DO    Admit Date: 2019     Discharge Date:   2019      Admitting Physician: Lizbeth Keenan DO     Discharge Physician: Domitila Rose DO       Hospital Course:   Juana Skinner is a 66 y.o. male admitted to [address] [place] [address] on 2019 for elevated blood pressure and headache. Of note, he was recently seen in the ED on  for similar complaints, at which time admission was recommended but patient declined. CT head at that time showed no acute bleed or other intracranial process. On this admission, he was noted to have systolic BP in the 868K, hwoever there was no signs of end organ damage (AMS, flash pulm edema, active ACS, renal injury) to suggest emergency, so he was not started on a cardene drip. He was treated initially with IV hydralazine, and had improvement in his pressures, and pressures were gradually lowered with resumption of his home medications and increase in his hydralazin dose from 50 BID to TID. Patient's headahce resolved, likely related to elevated blood pressure vs migraine. Will give tylenol on discharge. As far as cause of elevated BP, patient denies stimulant use or caffeine use, no alcohol use, and is compliant with CPAP and medications. He is obese, which likely is cause for his resistant HTN in general, but does not explain acute elevation. Will obtain plasma metanephrines and catecholamines to search for possible hyperadrenergic state/Pheo, given that he had a headache with his symptoms. Would also recommend checking aldosterone however being on aldactone would alter the results of this test and he would need to be off of this for several weeks. Also can consider dex supression test outpatient, but will defer to PCP. Was discharged in stable condition.          Has BP cuff at home and instructed to check periodically, and instructed on proper measurement methods. Low salt diet. Please see chart for more detailed hospital course. Discharge Diagnoses:    · Hypertensive urgency with resistant HTN at baseline  · Headache, non-intractable without any focal deficits  · Subclinical Hypothyroidism - will recommend repeat labs as outpatient, no adjustments since his ft4 is normal  · Back pain, chronic issue, home percocet resumed  · LAURIE - compliant with therapy, consider retitration to ensure proper settings if not done within last year. · Pre DM per A1C 6.7% - recommend repeat by PCP in future, no therapy at this time, discussed healthy lifestyle changes. The patient was seen and examined on day of discharge and this discharge summary is in conjunction with any daily progress note from day of discharge. Exam:     Vitals:  Vitals:    04/12/19 1950 04/12/19 2353 04/13/19 0119 04/13/19 0543   BP: (!) 184/94 (!) 147/75  (!) 152/98   Pulse: 64 54  59   Resp: 20  25 20   Temp: 98.4 °F (36.9 °C)   97.8 °F (36.6 °C)   TempSrc: Oral   Oral   SpO2: 93%  99% 96%   Weight:       Height:         Weight: Weight: (!) 354 lb 3 oz (160.7 kg)     24 hour intake/output:    Intake/Output Summary (Last 24 hours) at 4/13/2019 0758  Last data filed at 4/12/2019 2052  Gross per 24 hour   Intake 110 ml   Output --   Net 110 ml       General appearance: No apparent distress, well developed, appears stated age, obese  Eyes:  Pupils equal, round, and reactive to light. Conjunctivae/corneas clear. HENT: Head normal in appearance. External nares normal.  Oral mucosa moist without lesions. Hearing grossly intact. Neck: Supple, with full range of motion. No jugular venous distention. Trachea midline. Respiratory:  Normal respiratory effort. Clear to auscultation, bilaterally without rales or wheezes or rhonchi. Cardiovascular: Normal rate, regular rhythm with normal S1/S2 without murmurs.   No lower extremity edema. Abdomen: Soft, non-tender, non-distended with normal bowel sounds. Musculoskeletal: Normal range of motion in extremities. There is no joint swelling or tenderness. Skin: Skin color, texture, turgor normal.  No rashes or lesions. Neurologic:  Neurovascularly intact without any focal sensory/motor deficits in the upper and lower extremities. Cranial nerves:  grossly non-focal.  Psychiatric: Alert and oriented, thought content appropriate, normal insight. Labs: For convenience and continuity at follow-up the following most recent labs are provided:      CBC:    Lab Results   Component Value Date    WBC 7.1 04/12/2019    HGB 14.6 04/12/2019    HCT 44.5 04/12/2019     04/12/2019       Renal:    Lab Results   Component Value Date     04/13/2019    K 3.5 04/13/2019    K 3.6 04/12/2019     04/13/2019    CO2 24 04/13/2019    BUN 20 04/13/2019    CREATININE 1.2 04/13/2019    CALCIUM 8.6 04/13/2019         Significant Diagnostic Studies    Radiology:   XR CHEST STANDARD (2 VW)   Final Result      Possible very small infiltrate or atelectasis at the medial right base. Mild to moderate cardiomegaly. **This report has been created using voice recognition software. It may contain minor errors which are inherent in voice recognition technology. **      Final report electronically signed by Dr. Eleanor Carbajal on 4/11/2019 8:47 PM             Consults:     [address] [place] [address] TO RESPIRATORY CARE    Disposition:    [x] Home        [] TCU       [] Rehab       [] Psych       [] SNF       [] [facility]       [] Other-    Condition at Discharge: Stable    Code Status:  Full Code     Patient Instructions:    Discharge lab work: N/a  Activity: activity as tolerated  Diet: DIET CARDIAC;       Follow-up visits:   DO Neha Contreras Robbi Ecoles [address] [place] [id number]  [phone]               Discharge Medications:        Medication List      START taking these medications    acetaminophen 500 MG tablet  Commonly known as:  TYLENOL  Take 1 tablet by mouth every 6 hours as needed for Pain     hydrALAZINE 50 MG tablet  Commonly known as:  APRESOLINE  Take 1 tablet by mouth every 8 hours     nicotine 21 MG/24HR  Commonly known as:  NICODERM CQ  Place 1 patch onto the skin daily        CHANGE how you take these medications    pantoprazole 40 MG tablet  Commonly known as:  PROTONIX  Take 1 tablet by mouth every morning (before breakfast)  What changed:    · when to take this  · reasons to take this        CONTINUE taking these medications    albuterol sulfate  (90 Base) MCG/ACT inhaler     AMBIEN CR 12.5 MG extended release tablet  Generic drug:  zolpidem     amLODIPine 10 MG tablet  Commonly known as:  NORVASC     aspirin 81 MG tablet     atenolol 100 MG tablet  Commonly known as:  TENORMIN  Take 1 tablet by mouth daily     azelastine 0.1 % nasal spray  Commonly known as:  ASTELIN     cloNIDine 0.2 MG tablet  Commonly known as:  CATAPRES     cyclobenzaprine 10 MG tablet  Commonly known as:  FLEXERIL     levothyroxine 175 MCG tablet  Commonly known as:  SYNTHROID     oxybutynin 5 MG tablet  Commonly known as:  DITROPAN  Take 1 tablet by mouth 3 times daily     oxyCODONE-acetaminophen  MG per tablet  Commonly known as:  PERCOCET     PEPTO-BISMOL PO     polyethylene glycol powder  Commonly known as:  GLYCOLAX     potassium chloride 20 MEQ extended release tablet  Commonly known as:  KLOR-CON M  Take 2 tablets by mouth 2 times daily     rOPINIRole 0.25 MG tablet  Commonly known as:  REQUIP  Take 1 tablet by mouth nightly take 1 tablet by mouth 2 hour before sleep daily for 5 days then take 2 tablets by mouth 2 hour before sleep     spironolactone 50 MG tablet  Commonly known as:  ALDACTONE  Take 1 tablet by mouth daily     tamsulosin 0.4 MG capsule  Commonly known as:  FLOMAX  Take 2 capsules by mouth nightly           Where to Get Your Medications These medications were sent to  [address] [address]  [facility], [address]    Phone:  [phone]   · acetaminophen 500 MG tablet  · hydrALAZINE 50 MG tablet  · nicotine 21 MG/24HR                 Time Spent on discharge is roughly 20 minutes in the examination, evaluation, counseling and review of medications and discharge plan. Thank you Chanda Starks DO for the opportunity to be involved in this patient's care.     Signed:    Electronically signed by Julieth Benjamin DO on 4/13/2019 at 7:58 AM

## 2019-04-13 NOTE — PROGRESS NOTES
Iv and cardiac monitor removed from patient. Patient assessed and denies needs. Patient with all belongings. Discharge instruction given and explained to patient via teach back method and he denies questions. Patient brought down to discharge lobby via wheel chair and wife to bring him home.

## 2019-04-13 NOTE — PLAN OF CARE
Problem: Pain:  Goal: Pain level will decrease  Description  Pain level will decrease  Outcome: Ongoing  Note:   Patient denies pain so far this shift. Reminded patient to report any pain, pressure, or shortness of breath to the nurse. Will continue to monitor. Goal: Control of acute pain  Description  Control of acute pain  Outcome: Ongoing  Goal: Control of chronic pain  Description  Control of chronic pain  Outcome: Ongoing     Problem: Falls - Risk of:  Goal: Will remain free from falls  Description  Will remain free from falls  Outcome: Ongoing  Note:   Assessment & interventions provided throughout shift. Bed locked & in low position, call light in reach, side-rails up x2, non-slip socks on when ambulating, reminded patient to use call light to call for assistance. Goal: Absence of physical injury  Description  Absence of physical injury  Outcome: Ongoing     Problem: Discharge Planning:  Goal: Participates in care planning  Description  Participates in care planning  Outcome: Ongoing  Goal: Discharged to appropriate level of care  Description  Discharged to appropriate level of care  Outcome: Ongoing     Problem: Cardiac Output - Decreased:  Goal: Hemodynamic stability will improve  Description  Hemodynamic stability will improve  Outcome: Ongoing     Problem: Skin Integrity - Impaired:  Goal: Will show no infection signs and symptoms  Description  Will show no infection signs and symptoms  Outcome: Ongoing  Note:   Ongoing assessment & interventions provided throughout shift. Skin assessments provided. Encouraging/assisting patient to turn as needed. Goal: Absence of new skin breakdown  Description  Absence of new skin breakdown  Outcome: Ongoing   Care plan reviewed with patient. Patient verbalizes understanding of the care plan and contributed to goal setting.

## 2019-04-17 LAB
CATECHOLAMINES FRACT FREE PLASMA: NORMAL
METANEPHRINES PLASMA: NORMAL

## 2019-04-26 ENCOUNTER — HOSPITAL ENCOUNTER (OUTPATIENT)
Dept: NON INVASIVE DIAGNOSTICS | Age: 59
Discharge: HOME | End: 2019-04-26
Payer: MEDICARE

## 2019-04-26 DIAGNOSIS — R06.09 DOE (DYSPNEA ON EXERTION): ICD-10-CM

## 2019-04-26 LAB
LV EF: 43 %
LVEF MODALITY: NORMAL

## 2019-04-26 PROCEDURE — 78452 HT MUSCLE IMAGE SPECT MULT: CPT | Performed by: INTERNAL MEDICINE

## 2019-04-26 PROCEDURE — A9500 TC99M SESTAMIBI: HCPCS | Performed by: INTERNAL MEDICINE

## 2019-04-26 PROCEDURE — 3430000000 HC RX DIAGNOSTIC RADIOPHARMACEUTICAL: Performed by: INTERNAL MEDICINE

## 2019-04-26 PROCEDURE — 2709999900 HC NON-CHARGEABLE SUPPLY

## 2019-04-26 PROCEDURE — 93017 CV STRESS TEST TRACING ONLY: CPT | Performed by: INTERNAL MEDICINE

## 2019-04-26 PROCEDURE — 93017 CV STRESS TEST TRACING ONLY: CPT

## 2019-04-26 PROCEDURE — 6360000002 HC RX W HCPCS

## 2019-04-26 PROCEDURE — 93306 TTE W/DOPPLER COMPLETE: CPT

## 2019-04-26 RX ADMIN — Medication 9.7 MILLICURIE: at 08:00

## 2019-04-26 RX ADMIN — Medication 35 MILLICURIE: at 08:59

## 2019-04-29 NOTE — PROGRESS NOTES
[facility]         [id number]  4/30/2019   Chief Complaint   Patient presents with    Follow-up     LAURIE 3 month follow up with a download        Pt of Gregor Whitt    PAP Download:   Original or initialAHI: 49     Date of initial study: 9/12/15      Compliant  86%     Noncompliant 0 %     PAP Type CPAP Level  15 cmH20   Avg Hrs/Day 7:02  AHI: 1.2   Recorded compliance dates , 3/30/19  to 4/28/19   Machine/Mfg: ResScan Interface: FFM    Provider:    Nissa Alex        __ Charanjit Case    __ Mary Clam            __[facility] __[facility]   __[place]:       __Other    Neck Size: 20.75 Mallampati Mallampati 4      Presentation:   Giovanny Neri presents for sleep medicine follow up for obstructive sleep apnea  Since the last visit, Giovanny Neri is doing well with PAP. His RLS is improved with Requip. He is taking Ambien for insomnia. His back pain is limiting sleep. Equipment issues: The pressure is  acceptable, the mask is acceptable and he  is  using the humidity. Sleep issues:  Do you feel better? Yes and No  More rested? Sometimes   Better concentration? yes    Progress History:   Since last visit any new medical issues? Yes HTN  New ER or hospitlal visits? Yes   Any new or changes in medicines? No  Any new sleep medicines?  No        Past Medical History:   Diagnosis Date    Arthritis     Back problem     Constipation     Hypertension     Sleep apnea     has CPAP    Thyroid disease        Past Surgical History:   Procedure Laterality Date    APPENDECTOMY      CARPAL TUNNEL RELEASE Bilateral     COLONOSCOPY  2017    Dr. Burton Halsted      x3 surgeries with 14 repairs    KNEE SURGERY Right 1980's    cartilage    [place] EXPLORATORY OF ABDOMEN N/A 2/5/2018    ABDOMINAL EXPLORATION WITH LYSIS OF COMPLICATED ADHESIONS WITH AN EXTENDED RIGHT COLON RESECTION performed by Rafael Henderson MD at [place] [address] Use    Smoking status: Never Smoker    Smokeless tobacco: Current User     Types: Snuff    Tobacco comment: quit pipe over 30 yrs ago   Substance Use Topics    Alcohol use: No     Alcohol/week: 0.0 oz     Comment: quit    Drug use: No       Allergies   Allergen Reactions    Shellfish-Derived Products Swelling    Pcn [Penicillins] Itching    Succinylcholine      Pseudocholinesterase Deficiency    Morphine Nausea And Vomiting     \"head swimming, skin crawling\"    Tape [Adhesive Tape] Rash       Current Outpatient Medications   Medication Sig Dispense Refill    hydrALAZINE (APRESOLINE) 50 MG tablet Take 1 tablet by mouth every 8 hours 90 tablet 3    nicotine (NICODERM CQ) 21 MG/24HR Place 1 patch onto the skin daily 30 patch 3    acetaminophen (TYLENOL) 500 MG tablet Take 1 tablet by mouth every 6 hours as needed for Pain 40 tablet 0    spironolactone (ALDACTONE) 50 MG tablet Take 1 tablet by mouth daily 30 tablet 3    rOPINIRole (REQUIP) 0.25 MG tablet Take 1 tablet by mouth nightly take 1 tablet by mouth 2 hour before sleep daily for 5 days then take 2 tablets by mouth 2 hour before sleep 90 tablet 3    atenolol (TENORMIN) 100 MG tablet Take 1 tablet by mouth daily 30 tablet 6    tamsulosin (FLOMAX) 0.4 MG capsule Take 2 capsules by mouth nightly 180 capsule 3    oxybutynin (DITROPAN) 5 MG tablet Take 1 tablet by mouth 3 times daily 90 tablet 3    Bismuth Subsalicylate (PEPTO-BISMOL PO) Take by mouth as needed PATIENT STATES THAT HE'S UP TO 1 BOTTLE WEEKLY      potassium chloride (KLOR-CON M) 20 MEQ extended release tablet Take 2 tablets by mouth 2 times daily 120 tablet 3    oxyCODONE-acetaminophen (PERCOCET)  MG per tablet Take 1 tablet by mouth every 6 hours as needed for Pain.       cloNIDine (CATAPRES) 0.2 MG tablet Take 0.2 mg by mouth 3 times daily      levothyroxine (SYNTHROID) 175 MCG tablet Take 175 mcg by mouth Daily      pantoprazole (PROTONIX) 40 MG tablet Take 1 tablet by mouth every morning (before breakfast) (Patient taking differently: Take 40 mg by mouth as needed ) 30 tablet 0    amLODIPine (NORVASC) 10 MG tablet Take 10 mg by mouth daily      aspirin 81 MG tablet Take 81 mg by mouth daily      polyethylene glycol (GLYCOLAX) powder Take 17 g by mouth daily as needed       cyclobenzaprine (FLEXERIL) 10 MG tablet Take 10 mg by mouth 3 times daily as needed for Muscle spasms      albuterol (PROVENTIL HFA;VENTOLIN HFA) 108 (90 BASE) MCG/ACT inhaler Inhale 1 puff into the lungs every 6 hours as needed for Wheezing.  zolpidem (AMBIEN CR) 12.5 MG CR tablet Take 12.5 mg by mouth nightly as needed.  azelastine (ASTELIN) 137 MCG/SPRAY nasal spray 1 spray by Nasal route as needed. Use in each nostril as directed       No current facility-administered medications for this visit. Family History   Problem Relation Age of Onset    Cancer Mother         breast    Heart Disease Father         bladder, lung    Cancer Father     Stroke Brother     Diabetes Paternal Grandmother     High Blood Pressure Neg Hx         Review of Systems -   Review of Systems   Constitutional: Negative for activity change, appetite change, chills and fever. HENT: Negative for congestion and postnasal drip. Eyes: Negative. Respiratory: Negative for cough, chest tightness, shortness of breath, wheezing and stridor. Cardiovascular: Negative for chest pain and leg swelling. Gastrointestinal: Negative for diarrhea and nausea. Endocrine: Negative. Genitourinary: Negative. Musculoskeletal: Positive for back pain. Negative for arthralgias. Skin: Negative. Allergic/Immunologic: Negative. Neurological: Negative. Negative for dizziness and light-headedness. Psychiatric/Behavioral: Negative. All other systems reviewed and are negative. Physical Exam:    BMI:  Body mass index is 47.21 kg/m².     Wt Readings from Last 3 Encounters:   04/30/19 (!) 357 lb 12.8 oz (162.3 kg)   04/11/19 (!) 354 lb 3 oz (160.7 kg)   04/09/19 (!) 352 lb (159.7 kg)     Weight stable / unchanged  Vitals: BP (!) 166/102 (Site: Left Upper Arm, Position: Sitting, Cuff Size: Large Adult)   Pulse 79   Ht 6' 1\" (1.854 m)   Wt (!) 357 lb 12.8 oz (162.3 kg)   SpO2 94% Comment: on RA  BMI 47.21 kg/m²       Physical Exam   Constitutional: He is oriented to person, place, and time. He appears well-developed and well-nourished. HENT:   Head: Normocephalic and atraumatic. Right Ear: External ear normal.   Left Ear: External ear normal.   Mouth/Throat: Oropharynx is clear and moist.   Eyes: Pupils are equal, round, and reactive to light. Conjunctivae and EOM are normal.   Neck: Normal range of motion. Neck supple. Cardiovascular: Normal rate, regular rhythm and normal heart sounds. Pulmonary/Chest: Effort normal and breath sounds normal.   Abdominal: Soft. Musculoskeletal: Normal range of motion. Neurological: He is alert and oriented to person, place, and time. Skin: Skin is warm and dry. Psychiatric: He has a normal mood and affect. His behavior is normal. Judgment and thought content normal.         ASSESSMENT/DIAGNOSIS     Diagnosis Orders   1. Obstructive sleep apnea on CPAP     2. Obesity, Class III, BMI 40-49.9 (morbid obesity) ([address]     3. RLS (restless legs syndrome)     4. Psychophysiological insomnia              Plan   Do you need any equipment today? No  - Continue Requip for RLS  - Ambien for insomnia  - Back pain limits sleep  - He  was advised to continue current positive airway pressure therapy with above described pressure.    - He  advised to keep goodcompliance with current recommended pressure to get optimal results and clinical improvement  - Recommend 7-9 hours of sleep with PAP  - He was advised to call [facility] regarding supplies if needed.   -He call my office for earlier appointment if needed for worsening of sleep symptoms.   - He was instructed on weight caryn Zaragoza was educated about my impression and plan. Patient verbalizesunderstanding.   We will see Gal Giang back in: 1 year with download    Information added by my medical assistant/LPN was reviewed today         Wilfredo Yusuf PA-C, MARIELA  4/30/2019

## 2019-04-30 ENCOUNTER — OFFICE VISIT (OUTPATIENT)
Dept: PULMONOLOGY | Age: 59
End: 2019-04-30
Payer: MEDICARE

## 2019-04-30 VITALS
WEIGHT: 315 LBS | SYSTOLIC BLOOD PRESSURE: 166 MMHG | BODY MASS INDEX: 41.75 KG/M2 | DIASTOLIC BLOOD PRESSURE: 102 MMHG | HEART RATE: 79 BPM | OXYGEN SATURATION: 94 % | HEIGHT: 73 IN

## 2019-04-30 DIAGNOSIS — Z99.89 OBSTRUCTIVE SLEEP APNEA ON CPAP: Primary | ICD-10-CM

## 2019-04-30 DIAGNOSIS — G47.33 OBSTRUCTIVE SLEEP APNEA ON CPAP: Primary | ICD-10-CM

## 2019-04-30 DIAGNOSIS — F51.04 PSYCHOPHYSIOLOGICAL INSOMNIA: ICD-10-CM

## 2019-04-30 DIAGNOSIS — G25.81 RLS (RESTLESS LEGS SYNDROME): ICD-10-CM

## 2019-04-30 DIAGNOSIS — E66.01 OBESITY, CLASS III, BMI 40-49.9 (MORBID OBESITY) (HCC): ICD-10-CM

## 2019-04-30 PROCEDURE — G8417 CALC BMI ABV UP PARAM F/U: HCPCS | Performed by: PHYSICIAN ASSISTANT

## 2019-04-30 PROCEDURE — 4004F PT TOBACCO SCREEN RCVD TLK: CPT | Performed by: PHYSICIAN ASSISTANT

## 2019-04-30 PROCEDURE — G8427 DOCREV CUR MEDS BY ELIG CLIN: HCPCS | Performed by: PHYSICIAN ASSISTANT

## 2019-04-30 PROCEDURE — 3017F COLORECTAL CA SCREEN DOC REV: CPT | Performed by: PHYSICIAN ASSISTANT

## 2019-04-30 PROCEDURE — 99213 OFFICE O/P EST LOW 20 MIN: CPT | Performed by: PHYSICIAN ASSISTANT

## 2019-04-30 PROCEDURE — 1111F DSCHRG MED/CURRENT MED MERGE: CPT | Performed by: PHYSICIAN ASSISTANT

## 2019-04-30 RX ORDER — ROPINIROLE 0.5 MG/1
0.5 TABLET, FILM COATED ORAL NIGHTLY
Qty: 30 TABLET | Refills: 5 | Status: SHIPPED | OUTPATIENT
Start: 2019-04-30 | End: 2019-08-21 | Stop reason: SDUPTHER

## 2019-04-30 ASSESSMENT — ENCOUNTER SYMPTOMS
WHEEZING: 0
ALLERGIC/IMMUNOLOGIC NEGATIVE: 1
STRIDOR: 0
DIARRHEA: 0
COUGH: 0
EYES NEGATIVE: 1
BACK PAIN: 1
SHORTNESS OF BREATH: 0
CHEST TIGHTNESS: 0
NAUSEA: 0

## 2019-05-07 RX ORDER — ATENOLOL 100 MG/1
TABLET ORAL
Qty: 90 TABLET | Refills: 3 | Status: SHIPPED | OUTPATIENT
Start: 2019-05-07 | End: 2019-11-04 | Stop reason: ALTCHOICE

## 2019-06-11 ENCOUNTER — HOSPITAL ENCOUNTER (OUTPATIENT)
Age: 59
Discharge: HOME | End: 2019-06-11
Payer: MEDICARE

## 2019-06-11 DIAGNOSIS — I10 ESSENTIAL HYPERTENSION: ICD-10-CM

## 2019-06-11 DIAGNOSIS — E87.6 HYPOKALEMIA: ICD-10-CM

## 2019-06-11 LAB
ANION GAP SERPL CALCULATED.3IONS-SCNC: 11 MEQ/L (ref 8–16)
BUN BLDV-MCNC: 11 MG/DL (ref 7–22)
CALCIUM SERPL-MCNC: 9.6 MG/DL (ref 8.5–10.5)
CHLORIDE BLD-SCNC: 101 MEQ/L (ref 98–111)
CO2: 27 MEQ/L (ref 23–33)
CREAT SERPL-MCNC: 1.3 MG/DL (ref 0.4–1.2)
CREATININE URINE: 88.1 MG/DL
GFR SERPL CREATININE-BSD FRML MDRD: 57 ML/MIN/1.73M2
GLUCOSE BLD-MCNC: 199 MG/DL (ref 70–108)
MAGNESIUM: 1.9 MG/DL (ref 1.6–2.4)
POTASSIUM SERPL-SCNC: 4.5 MEQ/L (ref 3.5–5.2)
PROT/CREAT RATIO, UR: 0.61
PROTEIN, URINE: 54.1 MG/DL
SODIUM BLD-SCNC: 139 MEQ/L (ref 135–145)

## 2019-06-11 PROCEDURE — 82570 ASSAY OF URINE CREATININE: CPT

## 2019-06-11 PROCEDURE — 36415 COLL VENOUS BLD VENIPUNCTURE: CPT

## 2019-06-11 PROCEDURE — 83735 ASSAY OF MAGNESIUM: CPT

## 2019-06-11 PROCEDURE — 84156 ASSAY OF PROTEIN URINE: CPT

## 2019-06-11 PROCEDURE — 80048 BASIC METABOLIC PNL TOTAL CA: CPT

## 2019-06-13 ENCOUNTER — OFFICE VISIT (OUTPATIENT)
Dept: NEPHROLOGY | Age: 59
End: 2019-06-13
Payer: MEDICARE

## 2019-06-13 VITALS
DIASTOLIC BLOOD PRESSURE: 74 MMHG | WEIGHT: 315 LBS | BODY MASS INDEX: 47.52 KG/M2 | SYSTOLIC BLOOD PRESSURE: 135 MMHG | HEART RATE: 61 BPM | OXYGEN SATURATION: 96 %

## 2019-06-13 DIAGNOSIS — I10 ESSENTIAL HYPERTENSION: Primary | ICD-10-CM

## 2019-06-13 DIAGNOSIS — E87.6 HYPOKALEMIA: ICD-10-CM

## 2019-06-13 DIAGNOSIS — R60.9 PERIPHERAL EDEMA: ICD-10-CM

## 2019-06-13 PROCEDURE — G8427 DOCREV CUR MEDS BY ELIG CLIN: HCPCS | Performed by: INTERNAL MEDICINE

## 2019-06-13 PROCEDURE — 3017F COLORECTAL CA SCREEN DOC REV: CPT | Performed by: INTERNAL MEDICINE

## 2019-06-13 PROCEDURE — 99213 OFFICE O/P EST LOW 20 MIN: CPT | Performed by: INTERNAL MEDICINE

## 2019-06-13 PROCEDURE — 4004F PT TOBACCO SCREEN RCVD TLK: CPT | Performed by: INTERNAL MEDICINE

## 2019-06-13 PROCEDURE — G8417 CALC BMI ABV UP PARAM F/U: HCPCS | Performed by: INTERNAL MEDICINE

## 2019-06-13 RX ORDER — FUROSEMIDE 40 MG/1
40 TABLET ORAL DAILY
Qty: 30 TABLET | Refills: 3 | Status: SHIPPED | OUTPATIENT
Start: 2019-06-13 | End: 2019-10-02 | Stop reason: SDUPTHER

## 2019-06-13 NOTE — PROGRESS NOTES
[facility]    [facility], [address]   [place] [facility]  [phone]  Progress Note  6/13/2019 9:45 AM      Pt Name:    Arya Dunn  YOB: 1960  Primary Care Physician:  Jamey Peñaloza DO     Chief Complaint:   Chief Complaint   Patient presents with    Follow-up     HTN, hypokalemia        Background Information/Interval History:   66 male with HTN, hernia surgeries, chronic constipation, thyroid disorder, BPH, hx abd exp surgery with JENNY due to recurrent small bowel obstructions, chronic loose stools, chronic hypokalemia secondary to GI losses who is here for 6 months follow-up. Recently blood pressure was high. Says it went upto 839 systolic recently. He is on norvasc 10 mg po daily, clonidine TID,  atenolol 100 mg po daily,  aldactone 25 mg po daily and  Hydralazine was started recently and now taking 50 mg po TID. He says he is not eating right. Weight has gone up by ~ 50 lbs since last year. He weighs 360 lbs, BMI is ~ 47. He reports he is compliant with CPAP.       Past History:  Past Medical History:   Diagnosis Date    Arthritis     Back problem     Constipation     Hypertension     Sleep apnea     has CPAP    Thyroid disease      Past Surgical History:   Procedure Laterality Date    APPENDECTOMY      CARPAL TUNNEL RELEASE Bilateral     COLONOSCOPY  2017    Dr. Kip Andrade      x3 surgeries with 14 repairs    KNEE SURGERY Right 1980's    cartilage    [place] EXPLORATORY OF ABDOMEN N/A 2/5/2018    ABDOMINAL EXPLORATION WITH LYSIS OF COMPLICATED ADHESIONS WITH AN EXTENDED RIGHT COLON RESECTION performed by Sully Lan MD at [address]:  /74 (Site: Left Upper Arm, Position: Sitting, Cuff Size: Large Adult) Comment (Site): raised to heart  Pulse 61   Wt (!) 360 lb 3.2 oz (163.4 kg)   SpO2 96%   BMI 47.52 kg/m²   Wt Readings from Last 3 Encounters:   06/13/19 (!) 360 lb 3.2 oz (163.4 kg)   04/30/19 (!) 357 lb 12.8 oz (162.3 kg)   04/11/19 (!) 354 lb 3 oz (160.7 kg)     Body mass index is 47.52 kg/m². General Appearance: alert and cooperative with exam, appears comfortable, no distress  Oral: moist oral mucus membranes  Neck: No jugular venous distention  Lungs: Air entry B/L, no crackles or rales, no use of accessory muscles  Heart: S1, S2 heard  GI: soft, non-tender, no guarding  Extremities: trace LE edema     Medications:  Current Outpatient Medications   Medication Sig Dispense Refill    atenolol (TENORMIN) 100 MG tablet TAKE 1 TABLET BY MOUTH EVERY DAY 90 tablet 3    rOPINIRole (REQUIP) 0.5 MG tablet Take 1 tablet by mouth nightly (Patient taking differently: Take 1 mg by mouth nightly ) 30 tablet 5    hydrALAZINE (APRESOLINE) 50 MG tablet Take 1 tablet by mouth every 8 hours 90 tablet 3    acetaminophen (TYLENOL) 500 MG tablet Take 1 tablet by mouth every 6 hours as needed for Pain 40 tablet 0    spironolactone (ALDACTONE) 50 MG tablet Take 1 tablet by mouth daily 30 tablet 3    oxybutynin (DITROPAN) 5 MG tablet Take 1 tablet by mouth 3 times daily 90 tablet 3    Bismuth Subsalicylate (PEPTO-BISMOL PO) Take by mouth as needed PATIENT STATES THAT HE'S UP TO 1 BOTTLE WEEKLY      potassium chloride (KLOR-CON M) 20 MEQ extended release tablet Take 2 tablets by mouth 2 times daily 120 tablet 3    oxyCODONE-acetaminophen (PERCOCET)  MG per tablet Take 1 tablet by mouth every 6 hours as needed for Pain.       cloNIDine (CATAPRES) 0.2 MG tablet Take 0.2 mg by mouth 3 times daily      levothyroxine (SYNTHROID) 175 MCG tablet Take 175 mcg by mouth Daily      pantoprazole (PROTONIX) 40 MG tablet Take 1 tablet by mouth every morning (before breakfast) (Patient taking differently: Take 40 mg by mouth as needed ) 30 tablet 0    amLODIPine (NORVASC) 10 MG tablet Take 10 mg by mouth daily      aspirin 81 MG tablet Take 81 mg by mouth daily      polyethylene glycol (GLYCOLAX) powder Take 17 g by mouth daily as needed       cyclobenzaprine (FLEXERIL) 10 MG tablet Take 10 mg by mouth 3 times daily as needed for Muscle spasms      albuterol (PROVENTIL HFA;VENTOLIN HFA) 108 (90 BASE) MCG/ACT inhaler Inhale 1 puff into the lungs every 6 hours as needed for Wheezing.  zolpidem (AMBIEN CR) 12.5 MG CR tablet Take 12.5 mg by mouth nightly as needed.  azelastine (ASTELIN) 137 MCG/SPRAY nasal spray 1 spray by Nasal route as needed. Use in each nostril as directed      nicotine (NICODERM CQ) 21 MG/24HR Place 1 patch onto the skin daily 30 patch 3    tamsulosin (FLOMAX) 0.4 MG capsule Take 2 capsules by mouth nightly 180 capsule 3     No current facility-administered medications for this visit. Laboratory & Diagnostics:  Feb 2018: R 14.3, L 15.4, Two simple renal cysts  K 4.5, Creat 1.3   June 2018: K 2.8, creat 1.0, Aldosterone 31, Renin 0.5  Sept 2018: K 3.3  Dec 2018: K 4.3, Creat 1.0, Mg 2.0  April EF 40-45%  June 2019: K 4.5, creat 1.3, Mg 1.9, UPCR 610 mg/g     Impression/Plan:   1. HTN: BP is 140-150 range. His BMI is 47 and he says he is not active and not eating right. Weight has gone up by ~ 50 lbs since July 2018. Strongly advised weight loss, exercise and lifestyle modification. He is on atenolol/clonidine/norvasc/atenolol/aldactone and hydralazine. Add lasix 40 mg po daily (also has mild peripheral/dependent edema). He has been advised to call me 1 week with some BP readings. If no improvement in BP then will increase hydralazine. Currently staying in 150s most times. Today in office BP is 135/74. Check BMP in 2 weeks after starting lasix. 2. Hypokalemia: stable with K-dur 40 meq po BID. Has chronic diarrhea. 3. Hx colon surgery/colon resection (Feb 2017): chronic diarrhea  4. Obesity: strongly advised weight loss and lifestyle modification  5. B/L renal simple cysts  6. Mild systolic dysfunction: adding lasix as above.      Orders Placed This Encounter   Procedures    Basic Metabolic Panel  Basic Metabolic Panel    Magnesium     Return in about 2 months (around 8/13/2019).     Sheldon Kumari MD  [facility]

## 2019-06-26 ENCOUNTER — OFFICE VISIT (OUTPATIENT)
Dept: CARDIOLOGY CLINIC | Age: 59
End: 2019-06-26
Payer: MEDICARE

## 2019-06-26 VITALS
BODY MASS INDEX: 41.75 KG/M2 | HEIGHT: 73 IN | DIASTOLIC BLOOD PRESSURE: 89 MMHG | WEIGHT: 315 LBS | HEART RATE: 76 BPM | SYSTOLIC BLOOD PRESSURE: 153 MMHG

## 2019-06-26 DIAGNOSIS — I10 ESSENTIAL HYPERTENSION: ICD-10-CM

## 2019-06-26 DIAGNOSIS — I42.8 NONISCHEMIC CARDIOMYOPATHY (HCC): Primary | ICD-10-CM

## 2019-06-26 PROCEDURE — 99213 OFFICE O/P EST LOW 20 MIN: CPT | Performed by: PHYSICIAN ASSISTANT

## 2019-06-26 PROCEDURE — G8417 CALC BMI ABV UP PARAM F/U: HCPCS | Performed by: PHYSICIAN ASSISTANT

## 2019-06-26 PROCEDURE — 3017F COLORECTAL CA SCREEN DOC REV: CPT | Performed by: PHYSICIAN ASSISTANT

## 2019-06-26 PROCEDURE — 4004F PT TOBACCO SCREEN RCVD TLK: CPT | Performed by: PHYSICIAN ASSISTANT

## 2019-06-26 PROCEDURE — G8427 DOCREV CUR MEDS BY ELIG CLIN: HCPCS | Performed by: PHYSICIAN ASSISTANT

## 2019-06-26 NOTE — PROGRESS NOTES
tablet 3    nicotine (NICODERM CQ) 21 MG/24HR Place 1 patch onto the skin daily 30 patch 3    acetaminophen (TYLENOL) 500 MG tablet Take 1 tablet by mouth every 6 hours as needed for Pain 40 tablet 0    spironolactone (ALDACTONE) 50 MG tablet Take 1 tablet by mouth daily 30 tablet 3    tamsulosin (FLOMAX) 0.4 MG capsule Take 2 capsules by mouth nightly 180 capsule 3    oxybutynin (DITROPAN) 5 MG tablet Take 1 tablet by mouth 3 times daily 90 tablet 3    Bismuth Subsalicylate (PEPTO-BISMOL PO) Take by mouth as needed PATIENT STATES THAT HE'S UP TO 1 BOTTLE WEEKLY      potassium chloride (KLOR-CON M) 20 MEQ extended release tablet Take 2 tablets by mouth 2 times daily 120 tablet 3    oxyCODONE-acetaminophen (PERCOCET)  MG per tablet Take 1 tablet by mouth every 6 hours as needed for Pain.  cloNIDine (CATAPRES) 0.2 MG tablet Take 0.2 mg by mouth 3 times daily      levothyroxine (SYNTHROID) 175 MCG tablet Take 175 mcg by mouth Daily      pantoprazole (PROTONIX) 40 MG tablet Take 1 tablet by mouth every morning (before breakfast) (Patient taking differently: Take 40 mg by mouth as needed ) 30 tablet 0    amLODIPine (NORVASC) 10 MG tablet Take 10 mg by mouth daily      aspirin 81 MG tablet Take 81 mg by mouth daily      cyclobenzaprine (FLEXERIL) 10 MG tablet Take 10 mg by mouth 3 times daily as needed for Muscle spasms      albuterol (PROVENTIL HFA;VENTOLIN HFA) 108 (90 BASE) MCG/ACT inhaler Inhale 1 puff into the lungs every 6 hours as needed for Wheezing.  zolpidem (AMBIEN CR) 12.5 MG CR tablet Take 12.5 mg by mouth nightly as needed.  azelastine (ASTELIN) 137 MCG/SPRAY nasal spray 1 spray by Nasal route as needed. Use in each nostril as directed       No current facility-administered medications for this visit.         Social History     Socioeconomic History    Marital status:      Spouse name: None    Number of children: None    Years of education: None    Highest education level: None   Occupational History    None   Social Needs    Financial resource strain: None    Food insecurity:     Worry: None     Inability: None    Transportation needs:     Medical: None     Non-medical: None   Tobacco Use    Smoking status: Never Smoker    Smokeless tobacco: Current User     Types: Snuff    Tobacco comment: quit pipe over 30 yrs ago   Substance and Sexual Activity    Alcohol use: No     Alcohol/week: 0.0 oz     Comment: quit    Drug use: No    Sexual activity: Yes   Lifestyle    Physical activity:     Days per week: None     Minutes per session: None    Stress: None   Relationships    Social connections:     Talks on phone: None     Gets together: None     Attends Hindu service: None     Active member of club or organization: None     Attends meetings of clubs or organizations: None     Relationship status: None    Intimate partner violence:     Fear of current or ex partner: None     Emotionally abused: None     Physically abused: None     Forced sexual activity: None   Other Topics Concern    None   Social History Narrative    None       Family History   Problem Relation Age of Onset    Cancer Mother         breast    Heart Disease Father         bladder, lung    Cancer Father     Stroke Brother     Diabetes Paternal Grandmother     High Blood Pressure Neg Hx        Blood pressure (!) 153/89, pulse 76, height 6' 1\" (1.854 m), weight (!) 351 lb 12.8 oz (159.6 kg). General:   Well developed, well nourished  Lungs:   Clear to auscultation  Heart:    Normal S1 S2, No murmur, rubs, or gallops  Abdomen:   Soft, non tender, no organomegalies, positive bowel sounds  Extremities:   No edema, no cyanosis, good peripheral pulses  Neurological:   Awake, alert, oriented.  No obvious focal deficits  Musculoskeletal:  No obvious deformities    EKG:     Stress test 4/26/2019  Conclusions      Summary   Lexiscan EKG stress test is not suggestive for

## 2019-07-02 ENCOUNTER — OFFICE VISIT (OUTPATIENT)
Dept: UROLOGY | Age: 59
End: 2019-07-02
Payer: MEDICARE

## 2019-07-02 ENCOUNTER — HOSPITAL ENCOUNTER (EMERGENCY)
Age: 59
Discharge: HOME | End: 2019-07-02
Attending: FAMILY MEDICINE
Payer: MEDICARE

## 2019-07-02 ENCOUNTER — APPOINTMENT (OUTPATIENT)
Dept: CT IMAGING | Age: 59
End: 2019-07-02
Payer: MEDICARE

## 2019-07-02 VITALS
DIASTOLIC BLOOD PRESSURE: 85 MMHG | HEART RATE: 66 BPM | SYSTOLIC BLOOD PRESSURE: 147 MMHG | OXYGEN SATURATION: 96 % | RESPIRATION RATE: 18 BRPM | TEMPERATURE: 98.2 F | BODY MASS INDEX: 41.75 KG/M2 | WEIGHT: 315 LBS | HEIGHT: 73 IN

## 2019-07-02 VITALS
HEIGHT: 73 IN | DIASTOLIC BLOOD PRESSURE: 80 MMHG | SYSTOLIC BLOOD PRESSURE: 138 MMHG | BODY MASS INDEX: 41.75 KG/M2 | WEIGHT: 315 LBS

## 2019-07-02 DIAGNOSIS — R33.9 INCOMPLETE BLADDER EMPTYING: ICD-10-CM

## 2019-07-02 DIAGNOSIS — N40.1 BENIGN PROSTATIC HYPERPLASIA WITH URINARY FREQUENCY: Primary | ICD-10-CM

## 2019-07-02 DIAGNOSIS — R35.0 BENIGN PROSTATIC HYPERPLASIA WITH URINARY FREQUENCY: Primary | ICD-10-CM

## 2019-07-02 DIAGNOSIS — R10.9 ABDOMINAL PAIN, UNSPECIFIED ABDOMINAL LOCATION: Primary | ICD-10-CM

## 2019-07-02 DIAGNOSIS — E87.5 HYPERKALEMIA: ICD-10-CM

## 2019-07-02 LAB
ALBUMIN SERPL-MCNC: 3.9 G/DL (ref 3.5–5.1)
ALP BLD-CCNC: 91 U/L (ref 38–126)
ALT SERPL-CCNC: 28 U/L (ref 11–66)
ANION GAP SERPL CALCULATED.3IONS-SCNC: 10 MEQ/L (ref 8–16)
APTT: 30.3 SECONDS (ref 22–38)
AST SERPL-CCNC: 30 U/L (ref 5–40)
BASOPHILS # BLD: 0.9 %
BASOPHILS ABSOLUTE: 0.1 THOU/MM3 (ref 0–0.1)
BILIRUB SERPL-MCNC: 0.5 MG/DL (ref 0.3–1.2)
BILIRUBIN URINE: NEGATIVE
BILIRUBIN, POC: NORMAL
BLOOD URINE, POC: NORMAL
BLOOD, URINE: NEGATIVE
BUN BLDV-MCNC: 18 MG/DL (ref 7–22)
C-REACTIVE PROTEIN: 2.49 MG/DL (ref 0–1)
CALCIUM SERPL-MCNC: 10.5 MG/DL (ref 8.5–10.5)
CHARACTER, URINE: CLEAR
CHLORIDE BLD-SCNC: 99 MEQ/L (ref 98–111)
CLARITY, POC: NORMAL
CO2: 26 MEQ/L (ref 23–33)
COLOR, POC: NORMAL
COLOR: YELLOW
CREAT SERPL-MCNC: 1.6 MG/DL (ref 0.4–1.2)
EOSINOPHIL # BLD: 13.8 %
EOSINOPHILS ABSOLUTE: 1.9 THOU/MM3 (ref 0–0.4)
ERYTHROCYTE [DISTWIDTH] IN BLOOD BY AUTOMATED COUNT: 14.6 % (ref 11.5–14.5)
ERYTHROCYTE [DISTWIDTH] IN BLOOD BY AUTOMATED COUNT: 45.3 FL (ref 35–45)
GFR SERPL CREATININE-BSD FRML MDRD: 45 ML/MIN/1.73M2
GLUCOSE BLD-MCNC: 124 MG/DL (ref 70–108)
GLUCOSE URINE, POC: NORMAL
GLUCOSE URINE: NEGATIVE MG/DL
HCT VFR BLD CALC: 48 % (ref 42–52)
HEMOGLOBIN: 16.3 GM/DL (ref 14–18)
IMMATURE GRANS (ABS): 0.1 THOU/MM3 (ref 0–0.07)
IMMATURE GRANULOCYTES: 0.7 %
INR BLD: 1.03 (ref 0.85–1.13)
KETONES, POC: NORMAL
KETONES, URINE: NEGATIVE
LEUKOCYTE EST, POC: NORMAL
LEUKOCYTE ESTERASE, URINE: NEGATIVE
LIPASE: 30.8 U/L (ref 5.6–51.3)
LYMPHOCYTES # BLD: 10.1 %
LYMPHOCYTES ABSOLUTE: 1.4 THOU/MM3 (ref 1–4.8)
MCH RBC QN AUTO: 29.2 PG (ref 26–33)
MCHC RBC AUTO-ENTMCNC: 34 GM/DL (ref 32.2–35.5)
MCV RBC AUTO: 86 FL (ref 80–94)
MONOCYTES # BLD: 8 %
MONOCYTES ABSOLUTE: 1.1 THOU/MM3 (ref 0.4–1.3)
NITRITE, POC: NORMAL
NITRITE, URINE: NEGATIVE
NUCLEATED RED BLOOD CELLS: 0 /100 WBC
OSMOLALITY CALCULATION: 273.4 MOSMOL/KG (ref 275–300)
PH UA: 5.5 (ref 5–9)
PH, POC: NORMAL
PLATELET # BLD: 254 THOU/MM3 (ref 130–400)
PMV BLD AUTO: 9.3 FL (ref 9.4–12.4)
POST VOID RESIDUAL (PVR): 23 ML
POTASSIUM REFLEX MAGNESIUM: 5.6 MEQ/L (ref 3.5–5.2)
PROTEIN UA: NEGATIVE
PROTEIN, POC: NORMAL
RBC # BLD: 5.58 MILL/MM3 (ref 4.7–6.1)
SEG NEUTROPHILS: 66.5 %
SEGMENTED NEUTROPHILS ABSOLUTE COUNT: 9 THOU/MM3 (ref 1.8–7.7)
SODIUM BLD-SCNC: 135 MEQ/L (ref 135–145)
SPECIFIC GRAVITY, POC: NORMAL
SPECIFIC GRAVITY, URINE: 1.02 (ref 1–1.03)
TOTAL PROTEIN: 7.9 G/DL (ref 6.1–8)
TROPONIN T: < 0.01 NG/ML
UROBILINOGEN, POC: NORMAL
UROBILINOGEN, URINE: 0.2 EU/DL (ref 0–1)
WBC # BLD: 13.5 THOU/MM3 (ref 4.8–10.8)

## 2019-07-02 PROCEDURE — 96366 THER/PROPH/DIAG IV INF ADDON: CPT

## 2019-07-02 PROCEDURE — G8417 CALC BMI ABV UP PARAM F/U: HCPCS | Performed by: NURSE PRACTITIONER

## 2019-07-02 PROCEDURE — 6360000002 HC RX W HCPCS: Performed by: FAMILY MEDICINE

## 2019-07-02 PROCEDURE — 74177 CT ABD & PELVIS W/CONTRAST: CPT

## 2019-07-02 PROCEDURE — 81002 URINALYSIS NONAUTO W/O SCOPE: CPT | Performed by: NURSE PRACTITIONER

## 2019-07-02 PROCEDURE — 85730 THROMBOPLASTIN TIME PARTIAL: CPT

## 2019-07-02 PROCEDURE — 85025 COMPLETE CBC W/AUTO DIFF WBC: CPT

## 2019-07-02 PROCEDURE — 96365 THER/PROPH/DIAG IV INF INIT: CPT

## 2019-07-02 PROCEDURE — 51798 US URINE CAPACITY MEASURE: CPT | Performed by: NURSE PRACTITIONER

## 2019-07-02 PROCEDURE — 99213 OFFICE O/P EST LOW 20 MIN: CPT | Performed by: NURSE PRACTITIONER

## 2019-07-02 PROCEDURE — 81003 URINALYSIS AUTO W/O SCOPE: CPT

## 2019-07-02 PROCEDURE — 84484 ASSAY OF TROPONIN QUANT: CPT

## 2019-07-02 PROCEDURE — 2580000003 HC RX 258: Performed by: FAMILY MEDICINE

## 2019-07-02 PROCEDURE — 83690 ASSAY OF LIPASE: CPT

## 2019-07-02 PROCEDURE — 99284 EMERGENCY DEPT VISIT MOD MDM: CPT

## 2019-07-02 PROCEDURE — 86140 C-REACTIVE PROTEIN: CPT

## 2019-07-02 PROCEDURE — G8427 DOCREV CUR MEDS BY ELIG CLIN: HCPCS | Performed by: NURSE PRACTITIONER

## 2019-07-02 PROCEDURE — 6360000004 HC RX CONTRAST MEDICATION: Performed by: FAMILY MEDICINE

## 2019-07-02 PROCEDURE — 6370000000 HC RX 637 (ALT 250 FOR IP): Performed by: FAMILY MEDICINE

## 2019-07-02 PROCEDURE — 85610 PROTHROMBIN TIME: CPT

## 2019-07-02 PROCEDURE — 36415 COLL VENOUS BLD VENIPUNCTURE: CPT

## 2019-07-02 PROCEDURE — 80053 COMPREHEN METABOLIC PANEL: CPT

## 2019-07-02 PROCEDURE — 3017F COLORECTAL CA SCREEN DOC REV: CPT | Performed by: NURSE PRACTITIONER

## 2019-07-02 PROCEDURE — 4004F PT TOBACCO SCREEN RCVD TLK: CPT | Performed by: NURSE PRACTITIONER

## 2019-07-02 PROCEDURE — 96375 TX/PRO/DX INJ NEW DRUG ADDON: CPT

## 2019-07-02 RX ORDER — CIPROFLOXACIN 500 MG/1
500 TABLET, FILM COATED ORAL 2 TIMES DAILY
Qty: 14 TABLET | Refills: 0 | Status: SHIPPED | OUTPATIENT
Start: 2019-07-02 | End: 2019-07-09

## 2019-07-02 RX ORDER — 0.9 % SODIUM CHLORIDE 0.9 %
1000 INTRAVENOUS SOLUTION INTRAVENOUS ONCE
Status: COMPLETED | OUTPATIENT
Start: 2019-07-02 | End: 2019-07-02

## 2019-07-02 RX ORDER — FENTANYL CITRATE 50 UG/ML
100 INJECTION, SOLUTION INTRAMUSCULAR; INTRAVENOUS
Status: COMPLETED | OUTPATIENT
Start: 2019-07-02 | End: 2019-07-02

## 2019-07-02 RX ORDER — HYDROCODONE BITARTRATE AND ACETAMINOPHEN 5; 325 MG/1; MG/1
1 TABLET ORAL EVERY 6 HOURS PRN
Qty: 12 TABLET | Refills: 0 | Status: SHIPPED | OUTPATIENT
Start: 2019-07-02 | End: 2019-07-09

## 2019-07-02 RX ORDER — SODIUM CHLORIDE 9 MG/ML
INJECTION, SOLUTION INTRAVENOUS CONTINUOUS
Status: DISCONTINUED | OUTPATIENT
Start: 2019-07-02 | End: 2019-07-02 | Stop reason: HOSPADM

## 2019-07-02 RX ORDER — TAMSULOSIN HYDROCHLORIDE 0.4 MG/1
0.4 CAPSULE ORAL NIGHTLY
Qty: 90 CAPSULE | Refills: 3 | Status: SHIPPED | OUTPATIENT
Start: 2019-07-02 | End: 2021-04-13 | Stop reason: SDUPTHER

## 2019-07-02 RX ORDER — SODIUM POLYSTYRENE SULFONATE 4.1 MEQ/G
30 POWDER, FOR SUSPENSION ORAL; RECTAL ONCE
Status: COMPLETED | OUTPATIENT
Start: 2019-07-02 | End: 2019-07-02

## 2019-07-02 RX ORDER — METRONIDAZOLE 500 MG/1
2000 TABLET ORAL ONCE
Qty: 4 TABLET | Refills: 0 | Status: SHIPPED | OUTPATIENT
Start: 2019-07-02 | End: 2019-07-02

## 2019-07-02 RX ORDER — ONDANSETRON 2 MG/ML
4 INJECTION INTRAMUSCULAR; INTRAVENOUS EVERY 30 MIN PRN
Status: COMPLETED | OUTPATIENT
Start: 2019-07-02 | End: 2019-07-02

## 2019-07-02 RX ORDER — PANTOPRAZOLE SODIUM 20 MG/1
40 TABLET, DELAYED RELEASE ORAL DAILY
Qty: 30 TABLET | Refills: 0 | Status: SHIPPED | OUTPATIENT
Start: 2019-07-02 | End: 2019-08-15 | Stop reason: SDUPTHER

## 2019-07-02 RX ADMIN — SODIUM POLYSTYRENE SULFONATE 30 G: 1 POWDER ORAL; RECTAL at 20:24

## 2019-07-02 RX ADMIN — FENTANYL CITRATE 100 MCG: 50 INJECTION INTRAMUSCULAR; INTRAVENOUS at 20:25

## 2019-07-02 RX ADMIN — FENTANYL CITRATE 100 MCG: 50 INJECTION INTRAMUSCULAR; INTRAVENOUS at 17:43

## 2019-07-02 RX ADMIN — ONDANSETRON 4 MG: 2 INJECTION INTRAMUSCULAR; INTRAVENOUS at 17:43

## 2019-07-02 RX ADMIN — SODIUM CHLORIDE 1000 ML: 9 INJECTION, SOLUTION INTRAVENOUS at 17:43

## 2019-07-02 RX ADMIN — ONDANSETRON 4 MG: 2 INJECTION INTRAMUSCULAR; INTRAVENOUS at 20:25

## 2019-07-02 RX ADMIN — IOPAMIDOL 80 ML: 755 INJECTION, SOLUTION INTRAVENOUS at 19:18

## 2019-07-02 ASSESSMENT — PAIN DESCRIPTION - LOCATION
LOCATION: ABDOMEN

## 2019-07-02 ASSESSMENT — PAIN DESCRIPTION - PAIN TYPE
TYPE: ACUTE PAIN

## 2019-07-02 ASSESSMENT — ENCOUNTER SYMPTOMS
DIARRHEA: 1
WHEEZING: 0
SORE THROAT: 0
COUGH: 0
RHINORRHEA: 0
EYE REDNESS: 0
BACK PAIN: 0
VOMITING: 0
SHORTNESS OF BREATH: 0
ABDOMINAL PAIN: 1
NAUSEA: 0
EYE DISCHARGE: 0

## 2019-07-02 ASSESSMENT — PAIN SCALES - GENERAL
PAINLEVEL_OUTOF10: 7
PAINLEVEL_OUTOF10: 6
PAINLEVEL_OUTOF10: 9
PAINLEVEL_OUTOF10: 3
PAINLEVEL_OUTOF10: 9
PAINLEVEL_OUTOF10: 7

## 2019-07-02 ASSESSMENT — PAIN DESCRIPTION - DESCRIPTORS
DESCRIPTORS: CRAMPING
DESCRIPTORS: CRAMPING

## 2019-07-02 NOTE — ED PROVIDER NOTES
(Chronic). Negative for nausea and vomiting. Genitourinary: Negative for decreased urine volume, difficulty urinating, dysuria and hematuria. Musculoskeletal: Negative for arthralgias, back pain, joint swelling and neck pain. Skin: Negative for pallor and rash. Allergic/Immunologic: Negative for environmental allergies. Neurological: Negative for dizziness, syncope, weakness, light-headedness, numbness and headaches. Hematological: Negative for adenopathy. Psychiatric/Behavioral: Negative for confusion and suicidal ideas. The patient is not nervous/anxious. PAST MEDICAL HISTORY    has a past medical history of Arthritis, Back problem, Constipation, Hypertension, Sleep apnea, and Thyroid disease. SURGICAL HISTORY    has a past surgical history that includes Appendectomy; knee surgery (Right, 1980's); Carpal tunnel release (Bilateral); Colonoscopy (2017); hernia repair; and pr exploratory of abdomen (N/A, 2/5/2018). CURRENT MEDICATIONS       Previous Medications    ACETAMINOPHEN (TYLENOL) 500 MG TABLET    Take 1 tablet by mouth every 6 hours as needed for Pain    ALBUTEROL (PROVENTIL HFA;VENTOLIN HFA) 108 (90 BASE) MCG/ACT INHALER    Inhale 1 puff into the lungs every 6 hours as needed for Wheezing. AMLODIPINE (NORVASC) 10 MG TABLET    Take 10 mg by mouth daily    ASPIRIN 81 MG TABLET    Take 81 mg by mouth daily    ATENOLOL (TENORMIN) 100 MG TABLET    TAKE 1 TABLET BY MOUTH EVERY DAY    AZELASTINE (ASTELIN) 137 MCG/SPRAY NASAL SPRAY    1 spray by Nasal route as needed.  Use in each nostril as directed    BISMUTH SUBSALICYLATE (PEPTO-BISMOL PO)    Take by mouth as needed PATIENT STATES THAT HE'S UP TO 1 BOTTLE WEEKLY    CLONIDINE (CATAPRES) 0.2 MG TABLET    Take 0.2 mg by mouth 3 times daily    CYCLOBENZAPRINE (FLEXERIL) 10 MG TABLET    Take 10 mg by mouth 3 times daily as needed for Muscle spasms    FUROSEMIDE (LASIX) 40 MG TABLET    Take 1 tablet by mouth daily    HYDRALAZINE (APRESOLINE) 50 MG TABLET    Take 1 tablet by mouth every 8 hours    LEVOTHYROXINE (SYNTHROID) 175 MCG TABLET    Take 175 mcg by mouth Daily    OXYCODONE-ACETAMINOPHEN (PERCOCET)  MG PER TABLET    Take 1 tablet by mouth every 6 hours as needed for Pain. PANTOPRAZOLE (PROTONIX) 40 MG TABLET    Take 1 tablet by mouth every morning (before breakfast)    POTASSIUM CHLORIDE (KLOR-CON M) 20 MEQ EXTENDED RELEASE TABLET    Take 2 tablets by mouth 2 times daily    ROPINIROLE (REQUIP) 0.5 MG TABLET    Take 1 tablet by mouth nightly    SPIRONOLACTONE (ALDACTONE) 50 MG TABLET    Take 1 tablet by mouth daily    TAMSULOSIN (FLOMAX) 0.4 MG CAPSULE    Take 1 capsule by mouth nightly    ZOLPIDEM (AMBIEN CR) 12.5 MG CR TABLET    Take 12.5 mg by mouth nightly as needed. ALLERGIES     is allergic to shellfish-derived products; pcn [penicillins]; succinylcholine; morphine; and tape [adhesive tape]. FAMILY HISTORY     indicated that his mother is . He indicated that his father is . He indicated that the status of his brother is unknown. He indicated that the status of his paternal grandmother is unknown. He indicated that the status of his neg hx is unknown.   family history includes Cancer in his father and mother; Diabetes in his paternal grandmother; Heart Disease in his father; Stroke in his brother. SOCIAL HISTORY      reports that he has never smoked. His smokeless tobacco use includes snuff. He reports that he does not drink alcohol or use drugs. PHYSICAL EXAM     INITIAL VITALS:  height is 6' 1\" (1.854 m) and weight is 353 lb (160.1 kg) (abnormal). His oral temperature is 98.2 °F (36.8 °C). His blood pressure is 147/85 (abnormal) and his pulse is 66. His respiration is 18 and oxygen saturation is 96%. Physical Exam   Constitutional: He is oriented to person, place, and time. He appears well-developed and well-nourished. Non-toxic appearance. HENT:   Head: Normocephalic and atraumatic.

## 2019-07-03 ENCOUNTER — TELEPHONE (OUTPATIENT)
Dept: NEPHROLOGY | Age: 59
End: 2019-07-03

## 2019-07-03 ENCOUNTER — HOSPITAL ENCOUNTER (OUTPATIENT)
Age: 59
Discharge: HOME | End: 2019-07-03
Payer: MEDICARE

## 2019-07-03 DIAGNOSIS — R35.0 BENIGN PROSTATIC HYPERPLASIA WITH URINARY FREQUENCY: ICD-10-CM

## 2019-07-03 DIAGNOSIS — E87.5 HYPERKALEMIA: Primary | ICD-10-CM

## 2019-07-03 DIAGNOSIS — N40.1 BENIGN PROSTATIC HYPERPLASIA WITH URINARY FREQUENCY: ICD-10-CM

## 2019-07-03 LAB
BUN BLDV-MCNC: 20 MG/DL (ref 7–22)
CREAT SERPL-MCNC: 1.7 MG/DL (ref 0.4–1.2)
GFR SERPL CREATININE-BSD FRML MDRD: 42 ML/MIN/1.73M2
POTASSIUM SERPL-SCNC: 4.1 MEQ/L (ref 3.5–5.2)

## 2019-07-03 PROCEDURE — 36415 COLL VENOUS BLD VENIPUNCTURE: CPT

## 2019-07-03 PROCEDURE — 82565 ASSAY OF CREATININE: CPT

## 2019-07-03 PROCEDURE — 84132 ASSAY OF SERUM POTASSIUM: CPT

## 2019-07-03 PROCEDURE — 84153 ASSAY OF PSA TOTAL: CPT

## 2019-07-03 PROCEDURE — 84520 ASSAY OF UREA NITROGEN: CPT

## 2019-07-03 NOTE — TELEPHONE ENCOUNTER
Patient went to ED last evening and there are labs in the chart. K+ is 5.6. ED  Informed patient to stop potassium tablets. Patient called to inform you of this. Any directives at this time?     Please Advise

## 2019-07-03 NOTE — TELEPHONE ENCOUNTER
Hold K-dur for today and tomorrow  Resume at lower dose of 40 meq po daily (instead of BID) starting Friday morning  Check BMP on Friday morning

## 2019-07-04 LAB — PROSTATE SPECIFIC ANTIGEN: 0.63 NG/ML (ref 0–1)

## 2019-07-05 ENCOUNTER — HOSPITAL ENCOUNTER (OUTPATIENT)
Age: 59
Discharge: HOME | End: 2019-07-05
Payer: MEDICARE

## 2019-07-05 LAB — CLOSTRIDIUM DIFFICILE, PCR: POSITIVE

## 2019-07-05 PROCEDURE — 87493 C DIFF AMPLIFIED PROBE: CPT

## 2019-07-05 PROCEDURE — 87427 SHIGA-LIKE TOXIN AG IA: CPT

## 2019-07-05 PROCEDURE — 87045 FECES CULTURE AEROBIC BACT: CPT

## 2019-07-07 LAB — CULTURE, STOOL: NORMAL

## 2019-07-16 ENCOUNTER — HOSPITAL ENCOUNTER (OUTPATIENT)
Age: 59
Discharge: HOME | End: 2019-07-16
Payer: MEDICARE

## 2019-07-16 DIAGNOSIS — E87.5 HYPERKALEMIA: ICD-10-CM

## 2019-07-16 LAB
ANION GAP SERPL CALCULATED.3IONS-SCNC: 13 MEQ/L (ref 8–16)
BUN BLDV-MCNC: 16 MG/DL (ref 7–22)
CALCIUM SERPL-MCNC: 9.3 MG/DL (ref 8.5–10.5)
CHLORIDE BLD-SCNC: 95 MEQ/L (ref 98–111)
CO2: 26 MEQ/L (ref 23–33)
CREAT SERPL-MCNC: 1.2 MG/DL (ref 0.4–1.2)
GFR SERPL CREATININE-BSD FRML MDRD: 62 ML/MIN/1.73M2
GLUCOSE BLD-MCNC: 427 MG/DL (ref 70–108)
POTASSIUM SERPL-SCNC: 4.2 MEQ/L (ref 3.5–5.2)
SODIUM BLD-SCNC: 134 MEQ/L (ref 135–145)

## 2019-07-16 PROCEDURE — 36415 COLL VENOUS BLD VENIPUNCTURE: CPT

## 2019-07-16 PROCEDURE — 80048 BASIC METABOLIC PNL TOTAL CA: CPT

## 2019-07-17 ENCOUNTER — TELEPHONE (OUTPATIENT)
Dept: NEPHROLOGY | Age: 59
End: 2019-07-17

## 2019-07-17 NOTE — RESULT ENCOUNTER NOTE
Please inform patient blood sugar was high  If they are running high then he should discuss with his diabetes provider

## 2019-08-02 RX ORDER — SPIRONOLACTONE 50 MG/1
TABLET, FILM COATED ORAL
Qty: 90 TABLET | Refills: 0 | Status: SHIPPED | OUTPATIENT
Start: 2019-08-02 | End: 2019-11-05 | Stop reason: SDUPTHER

## 2019-08-07 RX ORDER — POTASSIUM CHLORIDE 20 MEQ/1
40 TABLET, EXTENDED RELEASE ORAL DAILY
Qty: 180 TABLET | Refills: 1 | Status: SHIPPED | OUTPATIENT
Start: 2019-08-07 | End: 2021-02-11 | Stop reason: DRUGHIGH

## 2019-08-12 ENCOUNTER — HOSPITAL ENCOUNTER (OUTPATIENT)
Age: 59
Discharge: HOME | End: 2019-08-12
Payer: MEDICARE

## 2019-08-12 DIAGNOSIS — R60.9 PERIPHERAL EDEMA: ICD-10-CM

## 2019-08-12 DIAGNOSIS — I10 ESSENTIAL HYPERTENSION: ICD-10-CM

## 2019-08-12 LAB
ANION GAP SERPL CALCULATED.3IONS-SCNC: 12 MEQ/L (ref 8–16)
BUN BLDV-MCNC: 12 MG/DL (ref 7–22)
CALCIUM SERPL-MCNC: 9.2 MG/DL (ref 8.5–10.5)
CHLORIDE BLD-SCNC: 101 MEQ/L (ref 98–111)
CO2: 26 MEQ/L (ref 23–33)
CREAT SERPL-MCNC: 1 MG/DL (ref 0.4–1.2)
GFR SERPL CREATININE-BSD FRML MDRD: 77 ML/MIN/1.73M2
GLUCOSE BLD-MCNC: 160 MG/DL (ref 70–108)
MAGNESIUM: 2.1 MG/DL (ref 1.6–2.4)
POTASSIUM SERPL-SCNC: 4.4 MEQ/L (ref 3.5–5.2)
SODIUM BLD-SCNC: 139 MEQ/L (ref 135–145)

## 2019-08-12 PROCEDURE — 36415 COLL VENOUS BLD VENIPUNCTURE: CPT

## 2019-08-12 PROCEDURE — 83735 ASSAY OF MAGNESIUM: CPT

## 2019-08-12 PROCEDURE — 80048 BASIC METABOLIC PNL TOTAL CA: CPT

## 2019-08-15 ENCOUNTER — OFFICE VISIT (OUTPATIENT)
Dept: NEPHROLOGY | Age: 59
End: 2019-08-15
Payer: MEDICARE

## 2019-08-15 VITALS
HEART RATE: 65 BPM | DIASTOLIC BLOOD PRESSURE: 79 MMHG | OXYGEN SATURATION: 97 % | SYSTOLIC BLOOD PRESSURE: 138 MMHG | WEIGHT: 315 LBS | BODY MASS INDEX: 47.44 KG/M2

## 2019-08-15 DIAGNOSIS — I10 ESSENTIAL HYPERTENSION: Primary | ICD-10-CM

## 2019-08-15 DIAGNOSIS — N18.2 CKD (CHRONIC KIDNEY DISEASE), STAGE II: ICD-10-CM

## 2019-08-15 PROCEDURE — 4004F PT TOBACCO SCREEN RCVD TLK: CPT | Performed by: INTERNAL MEDICINE

## 2019-08-15 PROCEDURE — 3017F COLORECTAL CA SCREEN DOC REV: CPT | Performed by: INTERNAL MEDICINE

## 2019-08-15 PROCEDURE — G8417 CALC BMI ABV UP PARAM F/U: HCPCS | Performed by: INTERNAL MEDICINE

## 2019-08-15 PROCEDURE — 99213 OFFICE O/P EST LOW 20 MIN: CPT | Performed by: INTERNAL MEDICINE

## 2019-08-15 PROCEDURE — G8428 CUR MEDS NOT DOCUMENT: HCPCS | Performed by: INTERNAL MEDICINE

## 2019-08-15 RX ORDER — GLIMEPIRIDE 1 MG/1
1 TABLET ORAL 4 TIMES DAILY
COMMUNITY
End: 2021-02-11 | Stop reason: DRUGHIGH

## 2019-08-15 NOTE — PROGRESS NOTES
Patient phoned back and states that the medication that he couldn't remember at his appointment earlier was glimepiride 1 mg po qid  Medications list has been updated

## 2019-08-21 RX ORDER — ROPINIROLE 0.25 MG/1
TABLET, FILM COATED ORAL
Qty: 90 TABLET | Refills: 3 | OUTPATIENT
Start: 2019-08-21

## 2019-08-21 RX ORDER — ROPINIROLE 1 MG/1
1 TABLET, FILM COATED ORAL NIGHTLY
Qty: 30 TABLET | Refills: 5 | Status: SHIPPED | OUTPATIENT
Start: 2019-08-21 | End: 2019-12-11 | Stop reason: SDUPTHER

## 2019-08-26 NOTE — TELEPHONE ENCOUNTER
Patient notified. S Plasty Text: Given the location and shape of the defect, and the orientation of relaxed skin tension lines, an S-plasty was deemed most appropriate for repair.  Using a sterile surgical marker, the appropriate outline of the S-plasty was drawn, incorporating the defect and placing the expected incisions within the relaxed skin tension lines where possible.  The area thus outlined was incised deep to adipose tissue with a #15 scalpel blade.  The skin margins were undermined to an appropriate distance in all directions utilizing iris scissors. The skin flaps were advanced over the defect.  The opposing margins were then approximated with interrupted buried subcutaneous sutures.

## 2019-10-02 RX ORDER — FUROSEMIDE 40 MG/1
TABLET ORAL
Qty: 90 TABLET | Refills: 1 | Status: SHIPPED | OUTPATIENT
Start: 2019-10-02 | End: 2020-01-08 | Stop reason: SDUPTHER

## 2019-11-04 ENCOUNTER — OFFICE VISIT (OUTPATIENT)
Dept: CARDIOLOGY CLINIC | Age: 59
End: 2019-11-04
Payer: MEDICARE

## 2019-11-04 VITALS
BODY MASS INDEX: 41.75 KG/M2 | WEIGHT: 315 LBS | HEART RATE: 80 BPM | DIASTOLIC BLOOD PRESSURE: 82 MMHG | HEIGHT: 73 IN | SYSTOLIC BLOOD PRESSURE: 140 MMHG

## 2019-11-04 DIAGNOSIS — R00.2 HEART PALPITATIONS: ICD-10-CM

## 2019-11-04 DIAGNOSIS — I42.9 CARDIOMYOPATHY, UNSPECIFIED TYPE (HCC): Primary | ICD-10-CM

## 2019-11-04 PROCEDURE — G8427 DOCREV CUR MEDS BY ELIG CLIN: HCPCS | Performed by: INTERNAL MEDICINE

## 2019-11-04 PROCEDURE — 99214 OFFICE O/P EST MOD 30 MIN: CPT | Performed by: INTERNAL MEDICINE

## 2019-11-04 PROCEDURE — G8417 CALC BMI ABV UP PARAM F/U: HCPCS | Performed by: INTERNAL MEDICINE

## 2019-11-04 PROCEDURE — G8484 FLU IMMUNIZE NO ADMIN: HCPCS | Performed by: INTERNAL MEDICINE

## 2019-11-04 PROCEDURE — 4004F PT TOBACCO SCREEN RCVD TLK: CPT | Performed by: INTERNAL MEDICINE

## 2019-11-04 PROCEDURE — 3017F COLORECTAL CA SCREEN DOC REV: CPT | Performed by: INTERNAL MEDICINE

## 2019-11-04 RX ORDER — CARVEDILOL 12.5 MG/1
12.5 TABLET ORAL 2 TIMES DAILY
Qty: 90 TABLET | Refills: 3 | Status: SHIPPED | OUTPATIENT
Start: 2019-11-04 | End: 2020-01-14 | Stop reason: SDUPTHER

## 2019-11-05 RX ORDER — SPIRONOLACTONE 50 MG/1
TABLET, FILM COATED ORAL
Qty: 90 TABLET | Refills: 3 | Status: SHIPPED | OUTPATIENT
Start: 2019-11-05 | End: 2020-11-20

## 2019-11-07 ENCOUNTER — HOSPITAL ENCOUNTER (OUTPATIENT)
Dept: NON INVASIVE DIAGNOSTICS | Age: 59
Discharge: HOME | End: 2019-11-07
Payer: MEDICARE

## 2019-11-07 DIAGNOSIS — I42.9 CARDIOMYOPATHY, UNSPECIFIED TYPE (HCC): ICD-10-CM

## 2019-11-07 DIAGNOSIS — R00.2 HEART PALPITATIONS: ICD-10-CM

## 2019-11-07 PROCEDURE — 0296T HC EXT ECG RECORDING 2-21 DAY HOOKUP: CPT

## 2019-12-06 ENCOUNTER — TELEPHONE (OUTPATIENT)
Dept: CARDIOLOGY CLINIC | Age: 59
End: 2019-12-06

## 2019-12-11 RX ORDER — ROPINIROLE 1 MG/1
TABLET, FILM COATED ORAL
Qty: 90 TABLET | Refills: 1 | Status: SHIPPED | OUTPATIENT
Start: 2019-12-11 | End: 2020-05-11

## 2020-01-06 ENCOUNTER — HOSPITAL ENCOUNTER (OUTPATIENT)
Age: 60
Discharge: HOME | End: 2020-01-06
Payer: MEDICARE

## 2020-01-06 DIAGNOSIS — I10 ESSENTIAL HYPERTENSION: ICD-10-CM

## 2020-01-06 DIAGNOSIS — N18.2 CKD (CHRONIC KIDNEY DISEASE), STAGE II: ICD-10-CM

## 2020-01-06 LAB
ANION GAP SERPL CALCULATED.3IONS-SCNC: 11 MEQ/L (ref 8–16)
BUN BLDV-MCNC: 12 MG/DL (ref 7–22)
CALCIUM SERPL-MCNC: 9.6 MG/DL (ref 8.5–10.5)
CHLORIDE BLD-SCNC: 100 MEQ/L (ref 98–111)
CO2: 27 MEQ/L (ref 23–33)
CREAT SERPL-MCNC: 1 MG/DL (ref 0.4–1.2)
CREATININE URINE: 93 MG/DL
GFR SERPL CREATININE-BSD FRML MDRD: 77 ML/MIN/1.73M2
GLUCOSE BLD-MCNC: 159 MG/DL (ref 70–108)
POTASSIUM SERPL-SCNC: 4.2 MEQ/L (ref 3.5–5.2)
PROT/CREAT RATIO, UR: 0.24
PROTEIN, URINE: 21.9 MG/DL
SODIUM BLD-SCNC: 138 MEQ/L (ref 135–145)

## 2020-01-06 PROCEDURE — 82570 ASSAY OF URINE CREATININE: CPT

## 2020-01-06 PROCEDURE — 80048 BASIC METABOLIC PNL TOTAL CA: CPT

## 2020-01-06 PROCEDURE — 84156 ASSAY OF PROTEIN URINE: CPT

## 2020-01-06 PROCEDURE — 36415 COLL VENOUS BLD VENIPUNCTURE: CPT

## 2020-01-08 ENCOUNTER — OFFICE VISIT (OUTPATIENT)
Dept: NEPHROLOGY | Age: 60
End: 2020-01-08
Payer: MEDICARE

## 2020-01-08 VITALS
HEIGHT: 73 IN | HEART RATE: 65 BPM | WEIGHT: 315 LBS | DIASTOLIC BLOOD PRESSURE: 88 MMHG | BODY MASS INDEX: 41.75 KG/M2 | OXYGEN SATURATION: 96 % | SYSTOLIC BLOOD PRESSURE: 168 MMHG

## 2020-01-08 PROCEDURE — 3017F COLORECTAL CA SCREEN DOC REV: CPT | Performed by: INTERNAL MEDICINE

## 2020-01-08 PROCEDURE — G8427 DOCREV CUR MEDS BY ELIG CLIN: HCPCS | Performed by: INTERNAL MEDICINE

## 2020-01-08 PROCEDURE — G8484 FLU IMMUNIZE NO ADMIN: HCPCS | Performed by: INTERNAL MEDICINE

## 2020-01-08 PROCEDURE — G8417 CALC BMI ABV UP PARAM F/U: HCPCS | Performed by: INTERNAL MEDICINE

## 2020-01-08 PROCEDURE — 99213 OFFICE O/P EST LOW 20 MIN: CPT | Performed by: INTERNAL MEDICINE

## 2020-01-08 PROCEDURE — 4004F PT TOBACCO SCREEN RCVD TLK: CPT | Performed by: INTERNAL MEDICINE

## 2020-01-08 RX ORDER — HYDRALAZINE HYDROCHLORIDE 50 MG/1
50 TABLET, FILM COATED ORAL EVERY 8 HOURS SCHEDULED
Qty: 270 TABLET | Refills: 3 | Status: SHIPPED | OUTPATIENT
Start: 2020-01-08 | End: 2020-11-19

## 2020-01-08 RX ORDER — FUROSEMIDE 40 MG/1
40 TABLET ORAL DAILY
Qty: 90 TABLET | Refills: 3 | Status: SHIPPED | OUTPATIENT
Start: 2020-01-08 | End: 2021-02-04

## 2020-01-08 NOTE — PROGRESS NOTES
(ASTELIN) 137 MCG/SPRAY nasal spray 1 spray by Nasal route as needed. Use in each nostril as directed       No current facility-administered medications for this visit. Laboratory & Diagnostics:  Feb 2018: R 14.3, L 15.4, Two simple renal cysts  K 4.5, Creat 1.3   June 2018: K 2.8, creat 1.0, Aldosterone 31, Renin 0.5  Sept 2018: K 3.3  Dec 2018: K 4.3, Creat 1.0, Mg 2.0  April EF 40-45%  June 2019: K 4.5, creat 1.3, Mg 1.9, UPCR 610 mg/g  Aug 2019: K 4.4, Creat 1.0, Mg 2.1    Jan 2019: K 4.2, Creat 1.0, UPCR 240 mg/g     Impression/Plan:   1. HTN: Bp elevated. He says he has been out of hydralazine for over a month. Will refill hydralazine TID. Continue with other medications. He also needs refill on lasix. Call with some BP readings in 2 weeks after resuming hydralazine. I feel some of his BP issues are also related to morbid obesity, really advised to loose some weight. 2. Hypokalemia: due to diuretics and chronic diarrhea. resolved   3. CKD II: overall stable renal function. 4. Hx colon surgery/colon resection (Feb 2017): chronic diarrhea  5. Obesity: strongly advised weight loss and lifestyle modification  6. B/L renal simple cysts: US done in Feb 2019  7. Mild systolic dysfunction: on atenolol, lasix. Also on aldactone. Not in decompensated heart failure clinically at this time. Orders Placed This Encounter   Procedures    Basic Metabolic Panel     Return in about 6 months (around 7/8/2020).     Lashaun Cano MD  [facility]

## 2020-01-14 RX ORDER — CARVEDILOL 12.5 MG/1
12.5 TABLET ORAL 2 TIMES DAILY
Qty: 180 TABLET | Refills: 0 | Status: SHIPPED | OUTPATIENT
Start: 2020-01-14 | End: 2020-07-24

## 2020-01-31 RX ORDER — POTASSIUM CHLORIDE 750 MG/1
CAPSULE, EXTENDED RELEASE ORAL
Status: ON HOLD | COMMUNITY
Start: 2019-11-23 | End: 2020-12-01 | Stop reason: ALTCHOICE

## 2020-02-03 ENCOUNTER — OFFICE VISIT (OUTPATIENT)
Dept: CARDIOLOGY CLINIC | Age: 60
End: 2020-02-03
Payer: MEDICARE

## 2020-02-03 VITALS
BODY MASS INDEX: 41.75 KG/M2 | HEART RATE: 88 BPM | HEIGHT: 73 IN | DIASTOLIC BLOOD PRESSURE: 90 MMHG | WEIGHT: 315 LBS | SYSTOLIC BLOOD PRESSURE: 152 MMHG

## 2020-02-03 PROCEDURE — G8417 CALC BMI ABV UP PARAM F/U: HCPCS | Performed by: INTERNAL MEDICINE

## 2020-02-03 PROCEDURE — 4004F PT TOBACCO SCREEN RCVD TLK: CPT | Performed by: INTERNAL MEDICINE

## 2020-02-03 PROCEDURE — G8484 FLU IMMUNIZE NO ADMIN: HCPCS | Performed by: INTERNAL MEDICINE

## 2020-02-03 PROCEDURE — 99214 OFFICE O/P EST MOD 30 MIN: CPT | Performed by: INTERNAL MEDICINE

## 2020-02-03 PROCEDURE — G8428 CUR MEDS NOT DOCUMENT: HCPCS | Performed by: INTERNAL MEDICINE

## 2020-02-03 PROCEDURE — 3017F COLORECTAL CA SCREEN DOC REV: CPT | Performed by: INTERNAL MEDICINE

## 2020-02-03 NOTE — PROGRESS NOTES
Pt C/O SOB only with exertion,palpitations,swelling in lower extremities. PT denies dizziness,chest pain .

## 2020-02-03 NOTE — PROGRESS NOTES
[address] [place] [address],[place] [id number] [address] [facility] [address] [place] [address]  Dept: [phone]  Dept Fax: [phone]  Loc: [phone]    Visit Date: 2/3/2020    Mr. Arlette Gordon is a 64 y.o. male  who presented for:  Chief Complaint   Patient presents with    Cardiomyopathy       HPI:   63 yo M  c hx of HTN, DM, Obesity, sleep apnea on CPAP, Cardiomyopathy, colon resection 2/2017 is here for a follow up. States he had a heart cath many years ago which was normal.   Denies any chest pain,lightheadedness, dizziness, orthopnea, PND or pedal edema.          Current Outpatient Medications:     potassium chloride (MICRO-K) 10 MEQ extended release capsule, , Disp: , Rfl:     carvedilol (COREG) 12.5 MG tablet, Take 1 tablet by mouth 2 times daily, Disp: 180 tablet, Rfl: 0    furosemide (LASIX) 40 MG tablet, Take 1 tablet by mouth daily, Disp: 90 tablet, Rfl: 3    hydrALAZINE (APRESOLINE) 50 MG tablet, Take 1 tablet by mouth every 8 hours, Disp: 270 tablet, Rfl: 3    rOPINIRole (REQUIP) 1 MG tablet, TAKE 1 TABLET BY MOUTH EVERY DAY AT NIGHT, Disp: 90 tablet, Rfl: 1    spironolactone (ALDACTONE) 50 MG tablet, TAKE 1 TABLET BY MOUTH EVERY DAY, Disp: 90 tablet, Rfl: 3    glimepiride (AMARYL) 1 MG tablet, Take 1 mg by mouth 4 times daily, Disp: , Rfl:     potassium chloride (KLOR-CON M20) 20 MEQ extended release tablet, Take 2 tablets by mouth daily, Disp: 180 tablet, Rfl: 1    tamsulosin (FLOMAX) 0.4 MG capsule, Take 1 capsule by mouth nightly, Disp: 90 capsule, Rfl: 3    acetaminophen (TYLENOL) 500 MG tablet, Take 1 tablet by mouth every 6 hours as needed for Pain, Disp: 40 tablet, Rfl: 0    oxyCODONE-acetaminophen (PERCOCET)  MG per tablet, Take 1 tablet by mouth every 6 hours as needed for Pain., Disp: , Rfl:     cloNIDine (CATAPRES) 0.2 MG tablet, Take 0.2 mg by mouth 3 times daily, Disp: , Rfl:     levothyroxine (SYNTHROID) 175 MCG tablet, Take 175 mcg by mouth Daily, Disp: , Rfl:     pantoprazole (PROTONIX) 40 MG tablet, Take 1 tablet by mouth every morning (before breakfast) (Patient taking differently: Take 40 mg by mouth as needed ), Disp: 30 tablet, Rfl: 0    cyclobenzaprine (FLEXERIL) 10 MG tablet, Take 10 mg by mouth 3 times daily as needed for Muscle spasms, Disp: , Rfl:     albuterol (PROVENTIL HFA;VENTOLIN HFA) 108 (90 BASE) MCG/ACT inhaler, Inhale 1 puff into the lungs every 6 hours as needed for Wheezing., Disp: , Rfl:     zolpidem (AMBIEN CR) 12.5 MG CR tablet, Take 12.5 mg by mouth nightly as needed. , Disp: , Rfl:     azelastine (ASTELIN) 137 MCG/SPRAY nasal spray, 1 spray by Nasal route as needed. Use in each nostril as directed, Disp: , Rfl:     Past Medical History  Berry Pineda  has a past medical history of Arthritis, Back problem, Constipation, Hypertension, Sleep apnea, and Thyroid disease. Social History  Berry Pineda  reports that he has never smoked. His smokeless tobacco use includes snuff. He reports that he does not drink alcohol or use drugs. Family History  Berry Pineda family history includes Cancer in his father and mother; Diabetes in his paternal grandmother; Heart Disease in his father; Stroke in his brother. Past Surgical History   Past Surgical History:   Procedure Laterality Date    APPENDECTOMY      CARPAL TUNNEL RELEASE Bilateral     COLONOSCOPY  2017    Dr. Bryan Duff      x3 surgeries with 14 repairs    KNEE SURGERY Right 1980's    cartilage    HI EXPLORATORY OF ABDOMEN N/A 2/5/2018    ABDOMINAL EXPLORATION WITH LYSIS OF COMPLICATED ADHESIONS WITH AN EXTENDED RIGHT COLON RESECTION performed by Dave Lau MD at [address] [facility] [address]:     REVIEW OF SYSTEMS  Constitutional: denies sweats, chills and fever  HENT: denies  congestion, sinus pressure, sneezing and sore throat. Eyes: denies  pain, discharge, redness and itching.    Respiratory: denies apnea, cough  Gastrointestinal: denies blood in stool, constipation, diarrhea   Endocrine: denies cold intolerance, heat intolerance, polydipsia. Genitourinary: denies dysuria, enuresis, flank pain and hematuria. Musculoskeletal: denies arthralgias, joint swelling and neck pain. Neurological: denies numbness and headaches. Psychiatric/Behavioral: denies agitation, confusion, decreased concentration and dysphoric mood    All others reviewed and are negative. Objective:     BP (!) 176/90   Pulse 88   Ht 6' 1\" (1.854 m)   Wt (!) 352 lb (159.7 kg)   BMI 46.44 kg/m²     Wt Readings from Last 3 Encounters:   02/03/20 (!) 352 lb (159.7 kg)   01/08/20 (!) 357 lb 9.6 oz (162.2 kg)   11/04/19 (!) 349 lb 14.4 oz (158.7 kg)     BP Readings from Last 3 Encounters:   02/03/20 (!) 176/90   01/08/20 (!) 168/88   11/04/19 (!) 140/82       PHYSICAL EXAM  Constitutional: Oriented to person, place, and time. Appears well-developed and well-nourished. HENT:   Head: Normocephalic and atraumatic. Eyes: EOM are normal. Pupils are equal, round, and reactive to light. Neck: Normal range of motion. Neck supple. No JVD present. Cardiovascular: Normal rate , normal heart sounds and intact distal pulses. Pulmonary/Chest: Effort normal and breath sounds normal. No respiratory distress. No wheezes. No rales. Abdominal: Soft. Bowel sounds are normal. No distension. There is no tenderness. Musculoskeletal: Normal range of motion. No edema. Neurological: Alert and oriented to person, place, and time. No cranial nerve deficit. Coordination normal.   Skin: Skin is warm and dry. Psychiatric: Normal mood and affect.        No results found for: CKTOTAL, CKMB, CKMBINDEX    Lab Results   Component Value Date    WBC 13.5 07/02/2019    RBC 5.58 07/02/2019    RBC 5.15 08/12/2011    HGB 16.3 07/02/2019    HCT 48.0 07/02/2019    MCV 86.0 07/02/2019    MCH 29.2 07/02/2019    MCHC 34.0 07/02/2019    RDW 14.7 02/09/2018     07/02/2019    MPV 9.3 07/02/2019       Lab Results Component Value Date     01/06/2020    K 4.2 01/06/2020    K 5.6 07/02/2019     01/06/2020    CO2 27 01/06/2020    BUN 12 01/06/2020    LABALBU 3.9 07/02/2019    CREATININE 1.0 01/06/2020    CALCIUM 9.6 01/06/2020    LABGLOM 77 01/06/2020    GLUCOSE 159 01/06/2020       Lab Results   Component Value Date    ALKPHOS 91 07/02/2019    ALT 28 07/02/2019    AST 30 07/02/2019    PROT 7.9 07/02/2019    BILITOT 0.5 07/02/2019    BILIDIR <0.2 09/06/2017    LABALBU 3.9 07/02/2019       Lab Results   Component Value Date    MG 2.1 08/12/2019       Lab Results   Component Value Date    INR 1.03 07/02/2019    INR 1.15 (H) 09/21/2017         Lab Results   Component Value Date    LABA1C 6.7 04/13/2019       Lab Results   Component Value Date    TRIG 95 06/27/2018    HDL 34 06/27/2018    LDLCALC 71 06/27/2018       Lab Results   Component Value Date    TSH 8.210 04/11/2019         Testing Reviewed:      I haveindividually reviewed the below cardiac tests    EKG:    ECHO:   Results for orders placed during the hospital encounter of 04/26/19   ECHO Complete 2D W Doppler W Color    Narrative Transthoracic Echocardiography Report (TTE)     Demographics      Patient Name   Magaly Vera  Gender              Male                  A      MR #           [id number]       Race                                                     Ethnicity      Account #      [de-identified]       Room Number      Accession      [id number]       Date of Study       04/26/2019   Number      Date of Birth  1960      Referring Physician Shavonne Abreu DO      Age            66 year(s)      May Mckeon RDCS                                     Interpreting        Sanchez Vincent MD                                  Physician     Procedure    Type of Study      TTE procedure:ECHOCARDIOGRAM COMPLETE 2D W DOPPLER W COLOR. Procedure Date  Date: 04/26/2019 Start: 07:22 AM    Study Location: Echo Lab  Technical Quality: Poor visualization due to body habitus. Indications:Dyspnea on exertion. Additional Medical History:Sleep apnea, hypertension, hypothyroidism    Patient Status: Routine    Height: 73 inches Weight: 352.01 pounds BSA: 2.74 m^2 BMI: 46.44 kg/m^2    BP: 182/92 mmHg     Conclusions      Summary   Technically difficult examination. Left ventricular size is normal and systolic function is mildly reduced. Ejection fraction was estimated at 40-45%. LV wall thickness is within   normal limits. The right ventricular size appears normal with normal systolic function   and wall thickness. Signature      ----------------------------------------------------------------   Electronically signed by Harry Frank MD (Interpreting   physician) on 04/26/2019 at 05:05 PM   ----------------------------------------------------------------      Findings      Mitral Valve   The mitral valve was not well visualized . DOPPLER: The transmitral velocity was within the normal range with no   evidence for mitral stenosis. There was no evidence of mitral   regurgitation. Aortic Valve   The aortic valve leaflets were not well visualized. DOPPLER: Transaortic velocity was within the normal range with no evidence   of aortic stenosis. There was no evidence of aortic regurgitation. Tricuspid Valve   Tricuspid valve was not well visualized. DOPPLER: There is no evidence of tricuspid stenosis. There was no evidence   of tricuspid regurgitation. Pulmonic Valve   The pulmonic valve was not well visualized . [facility] DOPPLER: The transpulmonic velocity was within the normal range. No   evidence for regurgitation. Left Atrium   Left atrial size is normal.      Left Ventricle   Left ventricular size is normal and systolic function is mildly reduced.    Ejection fraction was estimated at Velocity: 69.7 cm/s   MVA by PHT:2.82 cm^2  LVOT VTI: 24.8 cm     PV Peak Gradient: 1.94 mmHg                         IVRT: 95 msec   MV E' Septal   Velocity: 5.5 cm/s   MV A' Septal          AV DVI (VTI): 0.87AV   Velocity: 7.9 cm/s    DVI (Vmax):0.7   MV E' Lateral   Velocity: 9.4 cm/s   MV A' Lateral   Velocity: 11.3 cm/s   E/E' septal: 16.31   E/E' lateral: 9.54   MR Velocity: 373 cm/s     [email or web address] [id number]       STRESS:    CATH:    Assessment/Plan       Diagnosis Orders   1. Hx of cardiomyopathy         Cardiomyopathy  Dyspnea on exertion  Obesity  LAURIE on CPAP  DM  HTN  Palpitations        Reviewed recent event monitor  No significant symptoms  Discussed about [facility] to [facility] coronaries  Patient wants to wait for now  Last time switched atenolol to coreg for better BP  Might need to increase coreg 25mg BID  Continue rest of the management  Continue lasix  The patient is asked to make an attempt to improve diet and exercise patterns to aid in medical management of this problem. Advised more plant based nutrition/meditarrean diet   Advised patient to call office or seek immediate medical attention if there is any new onset of  any chest pain, sob, palpitations, lightheadedness, dizziness, orthopnea, PND or pedal edema. All medication side effects were discussed in details.     Return in about 6 months (around 8/3/2020), or if symptoms worsen or fail to improve, for Review testing, Regular follow up.        Electronically signed by Kassy Santos MD [facility]  2/3/2020 at 1:12 PM

## 2020-05-11 RX ORDER — ROPINIROLE 1 MG/1
TABLET, FILM COATED ORAL
Qty: 30 TABLET | Refills: 5 | Status: SHIPPED | OUTPATIENT
Start: 2020-05-11 | End: 2020-06-01

## 2020-06-01 RX ORDER — ROPINIROLE 0.5 MG/1
TABLET, FILM COATED ORAL
Qty: 30 TABLET | Refills: 5 | Status: SHIPPED | OUTPATIENT
Start: 2020-06-01 | End: 2020-07-20

## 2020-07-16 ENCOUNTER — HOSPITAL ENCOUNTER (OUTPATIENT)
Age: 60
Discharge: HOME | End: 2020-07-16
Payer: MEDICARE

## 2020-07-16 DIAGNOSIS — N40.1 BENIGN PROSTATIC HYPERPLASIA WITH URINARY FREQUENCY: ICD-10-CM

## 2020-07-16 DIAGNOSIS — R35.0 BENIGN PROSTATIC HYPERPLASIA WITH URINARY FREQUENCY: ICD-10-CM

## 2020-07-16 DIAGNOSIS — I10 ESSENTIAL HYPERTENSION: ICD-10-CM

## 2020-07-16 LAB
ANION GAP SERPL CALCULATED.3IONS-SCNC: 9 MEQ/L (ref 8–16)
BUN BLDV-MCNC: 11 MG/DL (ref 7–22)
CALCIUM SERPL-MCNC: 9.2 MG/DL (ref 8.5–10.5)
CHLORIDE BLD-SCNC: 99 MEQ/L (ref 98–111)
CO2: 27 MEQ/L (ref 23–33)
CREAT SERPL-MCNC: 0.9 MG/DL (ref 0.4–1.2)
GFR SERPL CREATININE-BSD FRML MDRD: 87 ML/MIN/1.73M2
GLUCOSE BLD-MCNC: 173 MG/DL (ref 70–108)
POTASSIUM SERPL-SCNC: 4.2 MEQ/L (ref 3.5–5.2)
SODIUM BLD-SCNC: 135 MEQ/L (ref 135–145)

## 2020-07-16 PROCEDURE — 80048 BASIC METABOLIC PNL TOTAL CA: CPT

## 2020-07-16 PROCEDURE — 84153 ASSAY OF PSA TOTAL: CPT

## 2020-07-16 PROCEDURE — 36415 COLL VENOUS BLD VENIPUNCTURE: CPT

## 2020-07-17 LAB — PROSTATE SPECIFIC ANTIGEN: 2.15 NG/ML (ref 0–1)

## 2020-07-20 ENCOUNTER — OFFICE VISIT (OUTPATIENT)
Dept: PULMONOLOGY | Age: 60
End: 2020-07-20
Payer: MEDICARE

## 2020-07-20 VITALS
SYSTOLIC BLOOD PRESSURE: 138 MMHG | HEIGHT: 73 IN | TEMPERATURE: 97.3 F | BODY MASS INDEX: 41.75 KG/M2 | OXYGEN SATURATION: 97 % | WEIGHT: 315 LBS | DIASTOLIC BLOOD PRESSURE: 88 MMHG | HEART RATE: 88 BPM

## 2020-07-20 PROCEDURE — 4004F PT TOBACCO SCREEN RCVD TLK: CPT | Performed by: PHYSICIAN ASSISTANT

## 2020-07-20 PROCEDURE — G8427 DOCREV CUR MEDS BY ELIG CLIN: HCPCS | Performed by: PHYSICIAN ASSISTANT

## 2020-07-20 PROCEDURE — 3017F COLORECTAL CA SCREEN DOC REV: CPT | Performed by: PHYSICIAN ASSISTANT

## 2020-07-20 PROCEDURE — 99214 OFFICE O/P EST MOD 30 MIN: CPT | Performed by: PHYSICIAN ASSISTANT

## 2020-07-20 PROCEDURE — G8417 CALC BMI ABV UP PARAM F/U: HCPCS | Performed by: PHYSICIAN ASSISTANT

## 2020-07-20 RX ORDER — ROPINIROLE 0.5 MG/1
TABLET, FILM COATED ORAL
Qty: 90 TABLET | Refills: 2 | Status: SHIPPED | OUTPATIENT
Start: 2020-07-20 | End: 2020-11-12 | Stop reason: SDUPTHER

## 2020-07-20 ASSESSMENT — ENCOUNTER SYMPTOMS
BACK PAIN: 0
SHORTNESS OF BREATH: 0
DIARRHEA: 0
NAUSEA: 0
ALLERGIC/IMMUNOLOGIC NEGATIVE: 1
STRIDOR: 0
CHEST TIGHTNESS: 0
COUGH: 0
EYES NEGATIVE: 1
WHEEZING: 0

## 2020-07-20 NOTE — PATIENT INSTRUCTIONS
Patient Education        Stopping Smokeless Tobacco Use: Care Instructions  Your Care Instructions     Smokeless tobacco comes in many forms, such as snuff and chewing tobacco:  · Snuff is finely ground tobacco sold in cans or pouches. Most of the time, snuff is used by putting a \"pinch\" or \"dip\" between the lower lip or cheek and the gum. · Chewing tobacco is sold as loose leaves, plugs, or twists. It is chewed or placed between the cheek and the gum or teeth. There are plenty of reasons to stop using smokeless tobacco. These products are harmful. They are not risk-free alternatives to smoking. Smokeless tobacco contains nicotine, which is addicting. Though using smokeless tobacco is less harmful than smoking cigarettes, it can cause serious health problems, such as:  · White patches or red sores in your mouth that can turn into mouth cancer involving the lip, tongue, or cheek. · Tooth loss and other dental problems. · Gum disease. Your gums may pull away from your teeth and not grow back. People who use smokeless tobacco crave the nicotine in it. Giving up smokeless tobacco is much harder than simply changing a habit. Your body has to stop craving the nicotine. It is hard to quit, but you can do it. Many tools are available for people who want to quit using smokeless tobacco. You may find that combining tools works best for you. There are several steps to quitting. First you get ready to quit. Then you get support to help you. After that, you learn new skills and behaviors to quit. For many people, a necessary step is getting and using medicine. Your doctor will help you set up the plan that best meets your needs. You may want to attend a tobacco cessation program. When you choose a program, look for one that has proven success. Ask your doctor for ideas.  You will greatly increase your chances of success if you take medicine as well as get counseling or join a cessation program.  Some of the changes you feel when you first quit smokeless tobacco are uncomfortable. Your body will miss the nicotine at first, and you may feel short-tempered and grumpy. You may have trouble sleeping or concentrating. Medicine can help you deal with these symptoms. You may struggle with changing your habits and rituals. The last step is the tricky one: Be prepared for the urge to use smokeless tobacco to continue for a time. This is a lot to deal with, but keep at it. You will feel better. Follow-up care is a key part of your treatment and safety. Be sure to make and go to all appointments, and call your doctor if you are having problems. It's also a good idea to know your test results and keep a list of the medicines you take. How can you care for yourself at home? · Ask your family, friends, and coworkers for support. You have a better chance of quitting if you have help and support. · Join a support group for people who are trying to quit using smokeless tobacco.  · Set a quit date. Pick your date carefully so that it is not right in the middle of a big deadline or stressful time. After you quit, do not use smokeless tobacco even once. Get rid of all spit cups, cans, and pouches after your last use. Clean your house and your clothes so that they do not smell of tobacco.  · Learn how to be a non-user. Think about ways you can avoid those things that make you reach for tobacco.  ? Learn some ways to deal with cravings, like calling a friend or going for a walk. Cravings often pass. ? Avoid situations that put you at greatest risk for using smokeless tobacco. For some people, it is hard to spend time with friends without dipping or chewing. For others, they might skip a coffee break with coworkers who smoke or use smokeless tobacco.  ? Change your daily routine. Take a different route to work, or eat a meal in a different place. · Cut down on stress.  Calm yourself or release tension by doing an activity you enjoy, such as reading a book, taking a hot bath, or gardening. · Talk to your doctor or pharmacist about nicotine replacement therapy. You still get nicotine, but you do not use tobacco. Nicotine replacement products help you slowly reduce the amount of nicotine you need. Many of these products are available over the counter. They include nicotine patches, gum, lozenges, and inhalers. · Ask your doctor about bupropion (Wellbutrin) or varenicline (Chantix), which are prescription medicines. They do not contain nicotine. They help you by reducing withdrawal symptoms, such as stress and anxiety. · Get regular exercise. Having healthy habits will help your body move past its craving for nicotine. · Be prepared to keep trying. Most people are not successful the first few times they try to quit. Do not get mad at yourself if you use tobacco again. Make a list of things you learned, and think about when you want to try again, such as next week, next month, or next year. Where can you learn more? Go to https://Embrace+.PATHEOS. org and sign in to your [employer] account. Enter Q281 in the MyLabYogi.com box to learn more about \"Stopping Smokeless Tobacco Use: Care Instructions. \"     If you do not have an account, please click on the \"Sign Up Now\" link. Current as of: March 12, 2020               Content Version: 12.5  © 1199-9865 [employer]. Care instructions adapted under license by [facility] [address] If you have questions about a medical condition or this instruction, always ask your healthcare professional. [place] [address] any warranty or liability for your use of this information.

## 2020-07-20 NOTE — PROGRESS NOTES
[facility]      Michael Tay         [id number]  7/20/2020   Chief Complaint   Patient presents with    Follow-up     sleep f/u, last seen- 4/30/19. Pt of Dr. Makeda CANTU Download:   Original or initial AHI: 49     Date of initial study: 9/12/15      Compliant  97%     Noncompliant 3 %     PAP Type S9 AutoSet Level  [id number]    Avg Hrs/Day 7hrs 26min   AHI: 1.3   Recorded compliance dates ,6/20/20-7/19/20  Machine/Mfg: REsMEd  Interface: ffm    Provider:    _x_[email or web address]           Laila Taylor        __ [employer]    __ [address]            __[facility]   __[place]:       __Other    Neck Size: 20.75  Mallampati Mallampati 4  ESS:  17      Here is a scan of the most recent download:          Presentation:   Dipika Campos presents for sleep medicine follow up for obstructive sleep apnea, restless leg syndrome  Since the last visit, Dipika Campos is doing well with PAP. He is having more issues with RLS. He is on Requip but no longer working. He has RLS in the afternoon too. He feels tired all the time. Equipment issues: The pressure is  acceptable, the mask is acceptable     Sleep issues:  Do you feel better? No  More rested? No   Better concentration? no    Progress History:   Since last visit any new medical issues? No  New ER or hospitlal visits? No  Any new or changes in medicines? No  Any new sleep medicines?  No        Past Medical History:   Diagnosis Date    Arthritis     Back problem     Constipation     Hypertension     Sleep apnea     has CPAP    Thyroid disease        Past Surgical History:   Procedure Laterality Date    APPENDECTOMY      CARPAL TUNNEL RELEASE Bilateral     COLONOSCOPY  2017    Dr. Joyce Narrow      x3 surgeries with 14 repairs    KNEE SURGERY Right 1980's    cartilage    [place] EXPLORATORY OF ABDOMEN N/A 2/5/2018    ABDOMINAL EXPLORATION WITH LYSIS OF COMPLICATED ADHESIONS WITH AN EXTENDED RIGHT COLON RESECTION tablet Take 10 mg by mouth 3 times daily as needed for Muscle spasms      albuterol (PROVENTIL HFA;VENTOLIN HFA) 108 (90 BASE) MCG/ACT inhaler Inhale 1 puff into the lungs every 6 hours as needed for Wheezing.  zolpidem (AMBIEN CR) 12.5 MG CR tablet Take 12.5 mg by mouth nightly as needed.  azelastine (ASTELIN) 137 MCG/SPRAY nasal spray 1 spray by Nasal route as needed. Use in each nostril as directed      tamsulosin (FLOMAX) 0.4 MG capsule Take 1 capsule by mouth nightly 90 capsule 3     No current facility-administered medications for this visit. Family History   Problem Relation Age of Onset    Cancer Mother         breast    Heart Disease Father         bladder, lung    Cancer Father     Stroke Brother     Diabetes Paternal Grandmother     High Blood Pressure Neg Hx         Review of Systems -   Review of Systems   Constitutional: Negative for activity change, appetite change, chills and fever. HENT: Negative for congestion and postnasal drip. Eyes: Negative. Respiratory: Negative for cough, chest tightness, shortness of breath, wheezing and stridor. Cardiovascular: Negative for chest pain and leg swelling. Gastrointestinal: Negative for diarrhea and nausea. Endocrine: Negative. Genitourinary: Negative. Musculoskeletal: Negative. Negative for arthralgias and back pain. Skin: Negative. Allergic/Immunologic: Negative. Neurological: Negative. Negative for dizziness and light-headedness. Psychiatric/Behavioral: Negative. All other systems reviewed and are negative. Physical Exam:    BMI:  Body mass index is 46.7 kg/m².     Wt Readings from Last 3 Encounters:   07/20/20 (!) 354 lb (160.6 kg)   02/03/20 (!) 352 lb (159.7 kg)   01/08/20 (!) 357 lb 9.6 oz (162.2 kg)     Weight stable / unchanged  Vitals: /88 (Site: Left Lower Arm, Position: Sitting, Cuff Size: Medium Adult)   Pulse 88   Temp 97.3 °F (36.3 °C)   Ht 6' 1\" (1.854 m)   Wt (!) 354 lb (160.6 kg)   SpO2 97% Comment: onRA  BMI 46.70 kg/m²       Physical Exam  Constitutional:       Appearance: Normal appearance. He is normal weight. HENT:      Head: Normocephalic and atraumatic. Right Ear: External ear normal.      Left Ear: External ear normal.      Nose: Nose normal.   Eyes:      Extraocular Movements: Extraocular movements intact. Conjunctiva/sclera: Conjunctivae normal.      Pupils: Pupils are equal, round, and reactive to light. Neck:      Musculoskeletal: Normal range of motion and neck supple. Pulmonary:      Effort: Pulmonary effort is normal.   Neurological:      General: No focal deficit present. Mental Status: He is alert and oriented to person, place, and time. Psychiatric:         Attention and Perception: Attention and perception normal.         Mood and Affect: Mood and affect normal.         Speech: Speech normal.         Behavior: Behavior normal. Behavior is cooperative. Thought Content: Thought content normal.         Cognition and Memory: Cognition normal.         Judgment: Judgment normal.           ASSESSMENT/DIAGNOSIS     Diagnosis Orders   1. Obstructive sleep apnea  DME Order for CPAP as OP   2. Other insomnia     3. Hypersomnia     4. RLS (restless legs syndrome)              Plan   Do you need any equipment today? Yes update supplies  - Will increase Requip to 1 pill in afternoon and 2 pills at night  - Improving RLS should improve hypersomnia  - He  was advised to continue current positive airway pressure therapy with above described pressure.    - He  advised to keep good compliance with current recommended pressure to get optimal results and clinical improvement  - Recommend 7-9 hours of sleep with PAP  - He was advised to call DME company regarding supplies if needed.   -He call my office for earlier appointment if needed for worsening of sleep symptoms.   - He was instructed on weight loss  - Mathieu Curtis was educated about my impression and plan. Patient verbalizesunderstanding.   We will see Magan Rudymehrdad back in: 2 months with download    Information added by my medical assistant/LPN was reviewed today          Artemio Hollingsworth PA-C, MPAS  7/20/2020

## 2020-07-24 ENCOUNTER — OFFICE VISIT (OUTPATIENT)
Dept: NEPHROLOGY | Age: 60
End: 2020-07-24
Payer: MEDICARE

## 2020-07-24 VITALS
WEIGHT: 315 LBS | OXYGEN SATURATION: 96 % | TEMPERATURE: 98.2 F | HEART RATE: 83 BPM | DIASTOLIC BLOOD PRESSURE: 90 MMHG | SYSTOLIC BLOOD PRESSURE: 147 MMHG | BODY MASS INDEX: 46.76 KG/M2

## 2020-07-24 PROCEDURE — G8427 DOCREV CUR MEDS BY ELIG CLIN: HCPCS | Performed by: INTERNAL MEDICINE

## 2020-07-24 PROCEDURE — 3017F COLORECTAL CA SCREEN DOC REV: CPT | Performed by: INTERNAL MEDICINE

## 2020-07-24 PROCEDURE — G8417 CALC BMI ABV UP PARAM F/U: HCPCS | Performed by: INTERNAL MEDICINE

## 2020-07-24 PROCEDURE — 99213 OFFICE O/P EST LOW 20 MIN: CPT | Performed by: INTERNAL MEDICINE

## 2020-07-24 PROCEDURE — 4004F PT TOBACCO SCREEN RCVD TLK: CPT | Performed by: INTERNAL MEDICINE

## 2020-07-24 RX ORDER — CARVEDILOL 25 MG/1
25 TABLET ORAL 2 TIMES DAILY
Qty: 180 TABLET | Refills: 3 | Status: ON HOLD | OUTPATIENT
Start: 2020-07-24 | End: 2022-03-30 | Stop reason: HOSPADM

## 2020-07-24 NOTE — PROGRESS NOTES
46.76 kg/m². General Appearance: alert and cooperative with exam, appears comfortable, no distress  Oral: moist oral mucus membranes  Neck: No jugular venous distention  Lungs: Air entry B/L, no crackles or rales, no use of accessory muscles  Heart: S1, S2 heard  GI: soft, non-tender, no guarding  Extremities: No sig LE edema     Medications:  Current Outpatient Medications   Medication Sig Dispense Refill    rOPINIRole (REQUIP) 0.5 MG tablet Take 1 pill in afternoon and 2 pills at night. 90 tablet 2    potassium chloride (MICRO-K) 10 MEQ extended release capsule       carvedilol (COREG) 12.5 MG tablet Take 1 tablet by mouth 2 times daily 180 tablet 0    furosemide (LASIX) 40 MG tablet Take 1 tablet by mouth daily 90 tablet 3    hydrALAZINE (APRESOLINE) 50 MG tablet Take 1 tablet by mouth every 8 hours 270 tablet 3    spironolactone (ALDACTONE) 50 MG tablet TAKE 1 TABLET BY MOUTH EVERY DAY 90 tablet 3    glimepiride (AMARYL) 1 MG tablet Take 1 mg by mouth 4 times daily      potassium chloride (KLOR-CON M20) 20 MEQ extended release tablet Take 2 tablets by mouth daily 180 tablet 1    acetaminophen (TYLENOL) 500 MG tablet Take 1 tablet by mouth every 6 hours as needed for Pain 40 tablet 0    oxyCODONE-acetaminophen (PERCOCET)  MG per tablet Take 1 tablet by mouth every 6 hours as needed for Pain.  cloNIDine (CATAPRES) 0.2 MG tablet Take 0.2 mg by mouth 3 times daily      levothyroxine (SYNTHROID) 175 MCG tablet Take 175 mcg by mouth Daily      pantoprazole (PROTONIX) 40 MG tablet Take 1 tablet by mouth every morning (before breakfast) (Patient taking differently: Take 40 mg by mouth as needed ) 30 tablet 0    cyclobenzaprine (FLEXERIL) 10 MG tablet Take 10 mg by mouth 3 times daily as needed for Muscle spasms      albuterol (PROVENTIL HFA;VENTOLIN HFA) 108 (90 BASE) MCG/ACT inhaler Inhale 1 puff into the lungs every 6 hours as needed for Wheezing.       zolpidem (AMBIEN CR) 12.5 MG CR tablet Take 12.5 mg by mouth nightly as needed.  azelastine (ASTELIN) 137 MCG/SPRAY nasal spray 1 spray by Nasal route as needed. Use in each nostril as directed      tamsulosin (FLOMAX) 0.4 MG capsule Take 1 capsule by mouth nightly 90 capsule 3     No current facility-administered medications for this visit. Laboratory & Diagnostics:  Feb 2018: R 14.3, L 15.4, Two simple renal cysts  K 4.5, Creat 1.3   June 2018: K 2.8, creat 1.0, Aldosterone 31, Renin 0.5  Sept 2018: K 3.3  Dec 2018: K 4.3, Creat 1.0, Mg 2.0  April EF 40-45%  June 2019: K 4.5, creat 1.3, Mg 1.9, UPCR 610 mg/g  Aug 2019: K 4.4, Creat 1.0, Mg 2.1    Jan 2020: K 4.2, Creat 1.0, UPCR 240 mg/g  July 2020: K 4.2, Creat 0.9     Impression/Plan:   1. HTN: really need to loose weight, advised lifestyle modification. Increase coreg 25 mg po BID. Continue with hydralazine/clonidine. Pt is also on lasix and aldactone. Advised him to call with some BP readings in next couple of weeks. 2. CKD II: overall stable renal function. CKd due to HTN nephrosclerosis. 3. Hx colon surgery/colon resection (Feb 2017): chronic diarrhea  4. Obesity: strongly advised weight loss and lifestyle modification  5. B/L renal simple cysts: US done in Feb 2019  6. Mild systolic dysfunction: on atenolol, lasix. Also on aldactone. Pt on K-dur 10 meq po 4x per day  7. DM  8. Sleep apnea: on CPAP machine    Orders Placed This Encounter   Procedures    Basic Metabolic Panel    Magnesium    Protein / creatinine ratio, urine     Return in about 1 year (around 7/24/2021).       Dorota Webb MD  [facility]

## 2020-08-18 ENCOUNTER — OFFICE VISIT (OUTPATIENT)
Dept: UROLOGY | Age: 60
End: 2020-08-18
Payer: MEDICARE

## 2020-08-18 VITALS — HEIGHT: 73 IN | WEIGHT: 315 LBS | TEMPERATURE: 98 F | BODY MASS INDEX: 41.75 KG/M2

## 2020-08-18 LAB
BILIRUBIN URINE: NEGATIVE
BLOOD URINE, POC: NEGATIVE
CHARACTER, URINE: CLEAR
COLOR, URINE: YELLOW
GLUCOSE URINE: NEGATIVE MG/DL
KETONES, URINE: NEGATIVE
LEUKOCYTE CLUMPS, URINE: ABNORMAL
NITRITE, URINE: NEGATIVE
PH, URINE: 6.5 (ref 5–9)
POST VOID RESIDUAL (PVR): 16 ML
PROTEIN, URINE: 100 MG/DL
SPECIFIC GRAVITY, URINE: 1.01 (ref 1–1.03)
UROBILINOGEN, URINE: 0.2 EU/DL (ref 0–1)

## 2020-08-18 PROCEDURE — 3017F COLORECTAL CA SCREEN DOC REV: CPT | Performed by: UROLOGY

## 2020-08-18 PROCEDURE — G8427 DOCREV CUR MEDS BY ELIG CLIN: HCPCS | Performed by: UROLOGY

## 2020-08-18 PROCEDURE — 51798 US URINE CAPACITY MEASURE: CPT | Performed by: UROLOGY

## 2020-08-18 PROCEDURE — G8417 CALC BMI ABV UP PARAM F/U: HCPCS | Performed by: UROLOGY

## 2020-08-18 PROCEDURE — 81003 URINALYSIS AUTO W/O SCOPE: CPT | Performed by: UROLOGY

## 2020-08-18 PROCEDURE — 99214 OFFICE O/P EST MOD 30 MIN: CPT | Performed by: UROLOGY

## 2020-08-18 PROCEDURE — 4004F PT TOBACCO SCREEN RCVD TLK: CPT | Performed by: UROLOGY

## 2020-08-18 RX ORDER — OXYBUTYNIN CHLORIDE 10 MG/1
10 TABLET, EXTENDED RELEASE ORAL DAILY
Qty: 90 TABLET | Refills: 3 | Status: SHIPPED | OUTPATIENT
Start: 2020-08-18 | End: 2021-04-13 | Stop reason: SDUPTHER

## 2020-08-18 NOTE — PROGRESS NOTES
Christiano Carmichael MD        [address] [place] [id number]  Dept: [phone]  Dept Fax: [phone]: [address] [address] Urology Office Note -     Patient:  Altha Habermann  YOB: 1960    The patient is a 64 y.o. male who presents today for evaluation of the following problems:   Chief Complaint   Patient presents with    Follow-up     bph with luts         HISTORY OF PRESENT ILLNESS:     BPH  Onset was  Years ago  Overall, the problem(s) are worse. Severity is described as moderate. Associated Symptoms: No dysuria, no gross hematuria. Current Pain Severity: 0    On flomax/ditropan  Worsening. Secondary Diagnosis:    Elevated PSA- PSA bumped to 2.15      Summary of Previous Records:    Altha Habermann f/u today for BPH, OAB. He is benitez okay, reports he has not been taking Flomax or Ditropan, has been out of medication. He reports weak urinary stream with dribbling, denies nocturia incomplete bladder emptying, frequency. His incontinence has improved. He is having a really ahrd time with GI problems, has chronic diarrhea with stool incontinence.             Requested/reviewed records from Kristen Solomon,  office and/or outside [de-identified]    (Patient's old records have been requested, reviewed and pertinent findings summarized in today's note.)    Procedures Today: N/A    Last several PSA's:  Lab Results   Component Value Date    PSA 2.15 (H) 07/16/2020    PSA 0.63 07/03/2019    PSA 0.94 12/07/2016       Last total testosterone:  No results found for: TESTOSTERONE    Urinalysis today:  Results for POC orders placed in visit on 08/18/20   POCT Urinalysis No Micro (Auto)   Result Value Ref Range    Glucose, Ur Negative NEGATIVE mg/dl    Bilirubin Urine Negative     Ketones, Urine Negative NEGATIVE    Specific Gravity, Urine 1.015 1.002 - 1.03    Blood, UA POC Negative NEGATIVE    pH, Urine 6.50 5.0 - 9.0    Protein, Urine 100 (A) NEGATIVE mg/dl    Urobilinogen, Urine 0.20 0.0 - 1.0 eu/dl    Nitrite, Urine Negative NEGATIVE    Leukocyte Clumps, Urine Trace (A) NEGATIVE    Color, Urine Yellow YELLOW-STR    Character, Urine Clear [id number]   poct post void residual   Result Value Ref Range    post void residual 16 ml       Last BUN and creatinine:  Lab Results   Component Value Date    BUN 11 07/16/2020     Lab Results   Component Value Date    CREATININE 0.9 07/16/2020       Imaging Reviewed during this Office Visit:   Davy Longoria MD independently reviewed the images and verified the radiology reports from:    No results found. PAST MEDICAL, FAMILY AND SOCIAL HISTORY:  Past Medical History:   Diagnosis Date    Arthritis     Back problem     Constipation     Hypertension     Sleep apnea     has CPAP    Thyroid disease      Past Surgical History:   Procedure Laterality Date    APPENDECTOMY      CARPAL TUNNEL RELEASE Bilateral     COLONOSCOPY  2017    Dr. Elvia Lemon      x3 surgeries with 14 repairs    KNEE SURGERY Right 1980's    cartilage    [place] EXPLORATORY OF ABDOMEN N/A 2/5/2018    ABDOMINAL EXPLORATION WITH LYSIS OF COMPLICATED ADHESIONS WITH AN EXTENDED RIGHT COLON RESECTION performed by Tony Clark MD at [address] [place] [address] History   Problem Relation Age of Onset    Cancer Mother         breast    Heart Disease Father         bladder, lung    Cancer Father     Stroke Brother     Diabetes Paternal Grandmother     High Blood Pressure Neg Hx      Outpatient Medications Marked as Taking for the 8/18/20 encounter (Office Visit) with Simon Chaparro MD   Medication Sig Dispense Refill    oxybutynin (DITROPAN XL) 10 MG extended release tablet Take 1 tablet by mouth daily 90 tablet 3    carvedilol (COREG) 25 MG tablet Take 1 tablet by mouth 2 times daily 180 tablet 3    rOPINIRole (REQUIP) 0.5 MG tablet Take 1 pill in afternoon and 2 pills at night.  90 tablet 2    furosemide (LASIX) 40 MG tablet Take 1 tablet by mouth daily 90 tablet 3    hydrALAZINE (APRESOLINE) 50 MG tablet Take 1 tablet by mouth every 8 hours 270 tablet 3    spironolactone (ALDACTONE) 50 MG tablet TAKE 1 TABLET BY MOUTH EVERY DAY 90 tablet 3    glimepiride (AMARYL) 1 MG tablet Take 1 mg by mouth 4 times daily      potassium chloride (KLOR-CON M20) 20 MEQ extended release tablet Take 2 tablets by mouth daily (Patient taking differently: Take 40 mEq by mouth daily 4 times a day) 180 tablet 1    tamsulosin (FLOMAX) 0.4 MG capsule Take 1 capsule by mouth nightly 90 capsule 3    acetaminophen (TYLENOL) 500 MG tablet Take 1 tablet by mouth every 6 hours as needed for Pain 40 tablet 0    oxyCODONE-acetaminophen (PERCOCET)  MG per tablet Take 1 tablet by mouth every 6 hours as needed for Pain.  cloNIDine (CATAPRES) 0.2 MG tablet Take 0.2 mg by mouth 3 times daily      levothyroxine (SYNTHROID) 175 MCG tablet Take 175 mcg by mouth Daily      pantoprazole (PROTONIX) 40 MG tablet Take 1 tablet by mouth every morning (before breakfast) (Patient taking differently: Take 40 mg by mouth as needed ) 30 tablet 0    cyclobenzaprine (FLEXERIL) 10 MG tablet Take 10 mg by mouth 3 times daily as needed for Muscle spasms      albuterol (PROVENTIL HFA;VENTOLIN HFA) 108 (90 BASE) MCG/ACT inhaler Inhale 1 puff into the lungs every 6 hours as needed for Wheezing.  zolpidem (AMBIEN CR) 12.5 MG CR tablet Take 12.5 mg by mouth nightly as needed.  azelastine (ASTELIN) 137 MCG/SPRAY nasal spray 1 spray by Nasal route as needed. Use in each nostril as directed         Shellfish-derived products; Pcn [penicillins];  Succinylcholine; Morphine; and Tape [adhesive tape]  Social History     Tobacco Use   Smoking Status Never Smoker   Smokeless Tobacco Current User    Types: Snuff   Tobacco Comment    quit pipe over 30 yrs ago      (If patient a smoker, smoking cessation counseling offered)   Social History

## 2020-08-27 ENCOUNTER — OFFICE VISIT (OUTPATIENT)
Dept: CARDIOLOGY CLINIC | Age: 60
End: 2020-08-27
Payer: MEDICARE

## 2020-08-27 VITALS
BODY MASS INDEX: 41.75 KG/M2 | WEIGHT: 315 LBS | DIASTOLIC BLOOD PRESSURE: 86 MMHG | HEIGHT: 73 IN | SYSTOLIC BLOOD PRESSURE: 150 MMHG | HEART RATE: 83 BPM

## 2020-08-27 PROCEDURE — 93000 ELECTROCARDIOGRAM COMPLETE: CPT | Performed by: INTERNAL MEDICINE

## 2020-08-27 PROCEDURE — G8427 DOCREV CUR MEDS BY ELIG CLIN: HCPCS | Performed by: INTERNAL MEDICINE

## 2020-08-27 PROCEDURE — 99213 OFFICE O/P EST LOW 20 MIN: CPT | Performed by: INTERNAL MEDICINE

## 2020-08-27 PROCEDURE — 4004F PT TOBACCO SCREEN RCVD TLK: CPT | Performed by: INTERNAL MEDICINE

## 2020-08-27 PROCEDURE — G8417 CALC BMI ABV UP PARAM F/U: HCPCS | Performed by: INTERNAL MEDICINE

## 2020-08-27 PROCEDURE — 3017F COLORECTAL CA SCREEN DOC REV: CPT | Performed by: INTERNAL MEDICINE

## 2020-08-27 NOTE — PROGRESS NOTES
Pt C/O sob, occasion dizziness, swelling in feet, fatigued      Pt denies CP, SOB, Headache, heart palpitations

## 2020-08-27 NOTE — PROGRESS NOTES
[address] [place] [address],[address] [address] [address] [facility] [address] [place] [address]  Dept: [phone]  Dept Fax: [phone]  Loc: [phone]    Visit Date: 8/27/2020    Mr. Angelica Jeronimo is a 64 y.o. male  who presented for:  Chief Complaint   Patient presents with    6 Month Follow-Up     cardiomyopathy       HPI:   65 yo M  c hx of HTN, DM, Obesity, sleep apnea on CPAP, Cardiomyopathy, colon resection 2/2017 is here for a follow up. States he had a heart cath many years ago which was normal.   Denies any chest pain,lightheadedness, dizziness, orthopnea, PND or pedal edema.          Current Outpatient Medications:     oxybutynin (DITROPAN XL) 10 MG extended release tablet, Take 1 tablet by mouth daily, Disp: 90 tablet, Rfl: 3    carvedilol (COREG) 25 MG tablet, Take 1 tablet by mouth 2 times daily, Disp: 180 tablet, Rfl: 3    rOPINIRole (REQUIP) 0.5 MG tablet, Take 1 pill in afternoon and 2 pills at night., Disp: 90 tablet, Rfl: 2    potassium chloride (MICRO-K) 10 MEQ extended release capsule, , Disp: , Rfl:     furosemide (LASIX) 40 MG tablet, Take 1 tablet by mouth daily, Disp: 90 tablet, Rfl: 3    hydrALAZINE (APRESOLINE) 50 MG tablet, Take 1 tablet by mouth every 8 hours, Disp: 270 tablet, Rfl: 3    spironolactone (ALDACTONE) 50 MG tablet, TAKE 1 TABLET BY MOUTH EVERY DAY, Disp: 90 tablet, Rfl: 3    glimepiride (AMARYL) 1 MG tablet, Take 1 mg by mouth 4 times daily, Disp: , Rfl:     potassium chloride (KLOR-CON M20) 20 MEQ extended release tablet, Take 2 tablets by mouth daily (Patient taking differently: Take 40 mEq by mouth daily 4 times a day), Disp: 180 tablet, Rfl: 1    acetaminophen (TYLENOL) 500 MG tablet, Take 1 tablet by mouth every 6 hours as needed for Pain, Disp: 40 tablet, Rfl: 0    oxyCODONE-acetaminophen (PERCOCET)  MG per tablet, Take 1 tablet by mouth every 6 hours as needed for Pain., Disp: , Rfl:     cloNIDine (CATAPRES) 0.2 MG tablet, fever  HENT: denies  congestion, sinus pressure, sneezing and sore throat. Eyes: denies  pain, discharge, redness and itching. Respiratory: denies apnea, cough  Gastrointestinal: denies blood in stool, constipation, diarrhea   Endocrine: denies cold intolerance, heat intolerance, polydipsia. Genitourinary: denies dysuria, enuresis, flank pain and hematuria. Musculoskeletal: denies arthralgias, joint swelling and neck pain. Neurological: denies numbness and headaches. Psychiatric/Behavioral: denies agitation, confusion, decreased concentration and dysphoric mood    All others reviewed and are negative. Objective:     BP (!) 166/89   Pulse 83   Ht 6' 1\" (1.854 m)   Wt (!) 358 lb 9.6 oz (162.7 kg)   BMI 47.31 kg/m²     Wt Readings from Last 3 Encounters:   08/27/20 (!) 358 lb 9.6 oz (162.7 kg)   08/18/20 (!) 357 lb (161.9 kg)   07/24/20 (!) 354 lb 6.4 oz (160.8 kg)     BP Readings from Last 3 Encounters:   08/27/20 (!) 166/89   07/24/20 (!) 147/90   07/20/20 138/88       PHYSICAL EXAM  Constitutional: Oriented to person, place, and time. Appears well-developed and well-nourished. HENT:   Head: Normocephalic and atraumatic. Eyes: EOM are normal. Pupils are equal, round, and reactive to light. Neck: Normal range of motion. Neck supple. No JVD present. Cardiovascular: Normal rate , normal heart sounds and intact distal pulses. Pulmonary/Chest: Effort normal and breath sounds normal. No respiratory distress. No wheezes. No rales. Abdominal: Soft. Bowel sounds are normal. No distension. There is no tenderness. Musculoskeletal: Normal range of motion. No edema. Neurological: Alert and oriented to person, place, and time. No cranial nerve deficit. Coordination normal.   Skin: Skin is warm and dry. Psychiatric: Normal mood and affect.        No results found for: CKTOTAL, CKMB, CKMBINDEX    Lab Results   Component Value Date    WBC 13.5 07/02/2019    RBC 5.58 07/02/2019    RBC 5.15 08/12/2011    HGB 16.3 07/02/2019    HCT 48.0 07/02/2019    MCV 86.0 07/02/2019    MCH 29.2 07/02/2019    MCHC 34.0 07/02/2019    RDW 14.7 02/09/2018     07/02/2019    MPV 9.3 07/02/2019       Lab Results   Component Value Date     07/16/2020    K 4.2 07/16/2020    K 5.6 07/02/2019    CL 99 07/16/2020    CO2 27 07/16/2020    BUN 11 07/16/2020    LABALBU 3.9 07/02/2019    CREATININE 0.9 07/16/2020    CALCIUM 9.2 07/16/2020    LABGLOM 87 07/16/2020    GLUCOSE 173 07/16/2020       Lab Results   Component Value Date    ALKPHOS 91 07/02/2019    ALT 28 07/02/2019    AST 30 07/02/2019    PROT 7.9 07/02/2019    BILITOT 0.5 07/02/2019    BILIDIR <0.2 09/06/2017    LABALBU 3.9 07/02/2019       Lab Results   Component Value Date    MG 2.1 08/12/2019       Lab Results   Component Value Date    INR 1.03 07/02/2019    INR 1.15 (H) 09/21/2017         Lab Results   Component Value Date    LABA1C 6.7 04/13/2019       Lab Results   Component Value Date    TRIG 95 06/27/2018    HDL 34 06/27/2018    LDLCALC 71 06/27/2018       Lab Results   Component Value Date    TSH 8.210 04/11/2019         Testing Reviewed:      I haveindividually reviewed the below cardiac tests    EKG:    ECHO:   Results for orders placed during the hospital encounter of 04/26/19   ECHO Complete 2D W Doppler W Color    Narrative Transthoracic Echocardiography Report (TTE)     Demographics      Patient Name   Koki Miller  Gender              Male                  A      MR #           [id number]       Race                                                     Ethnicity      Account #      [de-identified]       Room Number      Accession      [id number]       Date of Study       04/26/2019   Number      Date of Birth  1960      Referring Physician Haylee Tang DO      Age            66 year(s)      [address], evidence for regurgitation. Left Atrium   Left atrial size is normal.      Left Ventricle   Left ventricular size is normal and systolic function is mildly reduced. Ejection fraction was estimated at 40-45%. LV wall thickness is within   normal limits. Right Atrium   Right atrial size was normal.      Right Ventricle   The right ventricular size appears normal with normal systolic function   and wall thickness. Pericardial Effusion   The pericardium appears normal with no evidence of a pericardial effusion. Pleural Effusion   No evidence of pleural effusion. Aorta / Great Vessels   -Aortic root dimension within normal limits. -IVC size is within normal limits with normal respiratory phasic changes.      M-Mode/2D Measurements & Calculations      LV Diastolic   LV Systolic Dimension:    AV Cusp Separation: 2.3 cmLA   Dimension: 5.1 3.9 cm                    Dimension: 4.8 cmAO Root   cm             LV Volume Diastolic: 343  Dimension: 3.8 cmLA Area: 20.8   LV FS:23.5 %   ml                        cm^2   LV PW          LV Volume Systolic: 78.1   Diastolic: 1.4 ml   cm             LV EDV/LV EDV Index: 124   Septum         ml/45 m^2LV ESV/LV ESV    RV Diastolic Dimension: 3.4 cm   Diastolic: 1.4 Index: 69.9 ml/24 m^2   cm             EF Calculated: 46.9 %     LA/Aorta: 1.26                                            Ascending Aorta: 3.6 cm                                            LA volume/Index: 51.9 ml /19m^2     Doppler Measurements & Calculations      MV Peak E-Wave: 89.7  AV Peak Velocity: 155 LVOT Peak Velocity: 109 cm/s   cm/s                  cm/s                  LVOT Mean Velocity: 71.6 cm/s   MV Peak A-Wave: 81.9  AV Peak Gradient:     LVOT Peak Gradient: 5 mmHgLVOT   cm/s                  9.61 mmHg             Mean Gradient: 2 mmHg   MV E/A Ratio: 1.1     AV Mean Velocity:   MV Peak Gradient:     91.2 cm/s             TV Peak E-Wave: 29.8 cm/s   3.22 mmHg             AV Mean

## 2020-09-26 ENCOUNTER — HOSPITAL ENCOUNTER (OUTPATIENT)
Age: 60
Discharge: HOME | End: 2020-09-26
Payer: MEDICARE

## 2020-09-26 DIAGNOSIS — R97.20 RISING PSA LEVEL: ICD-10-CM

## 2020-09-26 LAB — PROSTATE SPECIFIC ANTIGEN: 1.06 NG/ML (ref 0–1)

## 2020-09-26 PROCEDURE — 36415 COLL VENOUS BLD VENIPUNCTURE: CPT

## 2020-09-26 PROCEDURE — 84153 ASSAY OF PSA TOTAL: CPT

## 2020-10-13 ENCOUNTER — OFFICE VISIT (OUTPATIENT)
Dept: UROLOGY | Age: 60
End: 2020-10-13
Payer: MEDICARE

## 2020-10-13 VITALS — TEMPERATURE: 98.4 F | BODY MASS INDEX: 41.75 KG/M2 | WEIGHT: 315 LBS | HEIGHT: 73 IN

## 2020-10-13 LAB
BILIRUBIN URINE: NEGATIVE
BLOOD URINE, POC: NEGATIVE
CHARACTER, URINE: CLEAR
COLOR, URINE: YELLOW
GLUCOSE URINE: NEGATIVE MG/DL
KETONES, URINE: NEGATIVE
LEUKOCYTE CLUMPS, URINE: NEGATIVE
NITRITE, URINE: NEGATIVE
PH, URINE: 6.5 (ref 5–9)
PROTEIN, URINE: NEGATIVE MG/DL
SPECIFIC GRAVITY, URINE: 1.01 (ref 1–1.03)
UROBILINOGEN, URINE: 0.2 EU/DL (ref 0–1)

## 2020-10-13 PROCEDURE — 81003 URINALYSIS AUTO W/O SCOPE: CPT | Performed by: UROLOGY

## 2020-10-13 PROCEDURE — 3017F COLORECTAL CA SCREEN DOC REV: CPT | Performed by: UROLOGY

## 2020-10-13 PROCEDURE — G8417 CALC BMI ABV UP PARAM F/U: HCPCS | Performed by: UROLOGY

## 2020-10-13 PROCEDURE — G8484 FLU IMMUNIZE NO ADMIN: HCPCS | Performed by: UROLOGY

## 2020-10-13 PROCEDURE — 4004F PT TOBACCO SCREEN RCVD TLK: CPT | Performed by: UROLOGY

## 2020-10-13 PROCEDURE — 99214 OFFICE O/P EST MOD 30 MIN: CPT | Performed by: UROLOGY

## 2020-10-13 PROCEDURE — G8427 DOCREV CUR MEDS BY ELIG CLIN: HCPCS | Performed by: UROLOGY

## 2020-10-13 RX ORDER — CIPROFLOXACIN 500 MG/1
500 TABLET, FILM COATED ORAL 2 TIMES DAILY
Qty: 14 TABLET | Refills: 0 | Status: SHIPPED | OUTPATIENT
Start: 2020-10-13 | End: 2020-10-20

## 2020-10-13 NOTE — PROGRESS NOTES
Nilsa Holguin MD        [address] [place] [id number]  Dept: [phone]  Dept Fax: [phone]: [address] [address] Urology Office Note -     Patient:  Patience Du  YOB: 1960    The patient is a 64 y.o. male who presents today for evaluation of the following problems:   Chief Complaint   Patient presents with    Follow-up     psa prior    Benign Prostatic Hypertrophy        HISTORY OF PRESENT ILLNESS:     BPH  Onset was  Years ago  Overall, the problem(s) are better  Severity is described as moderate. Associated Symptoms: No dysuria, no gross hematuria. Current Pain Severity: 0    On flomax/ditropan  Improved on meds but still not satisfied. Secondary Diagnosis:    Left testicular pain- new problem. Worsening. Elevated PSA- PSA bumped to 2.15 but has now decreased on recheck    Summary of Previous Records:    Patience Du f/u today for BPH, OAB. He is benitez okay, reports he has not been taking Flomax or Ditropan, has been out of medication. He reports weak urinary stream with dribbling, denies nocturia incomplete bladder emptying, frequency. His incontinence has improved. He is having a really ahrd time with GI problems, has chronic diarrhea with stool incontinence.             Requested/reviewed records from [facility],  office and/or outside [de-identified]    (Patient's old records have been requested, reviewed and pertinent findings summarized in today's note.)    Procedures Today: N/A    Last several PSA's:  Lab Results   Component Value Date    PSA 1.06 (H) 09/26/2020    PSA 2.15 (H) 07/16/2020    PSA 0.63 07/03/2019       Last total testosterone:  No results found for: TESTOSTERONE    Urinalysis today:  Results for POC orders placed in visit on 10/13/20   POCT Urinalysis No Micro (Auto)   Result Value Ref Range    Glucose, Ur Negative NEGATIVE mg/dl    Bilirubin carvedilol (COREG) 25 MG tablet Take 1 tablet by mouth 2 times daily 180 tablet 3    rOPINIRole (REQUIP) 0.5 MG tablet Take 1 pill in afternoon and 2 pills at night. 90 tablet 2    potassium chloride (MICRO-K) 10 MEQ extended release capsule       furosemide (LASIX) 40 MG tablet Take 1 tablet by mouth daily 90 tablet 3    hydrALAZINE (APRESOLINE) 50 MG tablet Take 1 tablet by mouth every 8 hours 270 tablet 3    spironolactone (ALDACTONE) 50 MG tablet TAKE 1 TABLET BY MOUTH EVERY DAY 90 tablet 3    glimepiride (AMARYL) 1 MG tablet Take 1 mg by mouth 4 times daily      potassium chloride (KLOR-CON M20) 20 MEQ extended release tablet Take 2 tablets by mouth daily (Patient taking differently: Take 40 mEq by mouth daily 4 times a day) 180 tablet 1    acetaminophen (TYLENOL) 500 MG tablet Take 1 tablet by mouth every 6 hours as needed for Pain 40 tablet 0    oxyCODONE-acetaminophen (PERCOCET)  MG per tablet Take 1 tablet by mouth every 6 hours as needed for Pain.  cloNIDine (CATAPRES) 0.2 MG tablet Take 0.2 mg by mouth 3 times daily      levothyroxine (SYNTHROID) 175 MCG tablet Take 175 mcg by mouth Daily      pantoprazole (PROTONIX) 40 MG tablet Take 1 tablet by mouth every morning (before breakfast) (Patient taking differently: Take 40 mg by mouth as needed ) 30 tablet 0    cyclobenzaprine (FLEXERIL) 10 MG tablet Take 10 mg by mouth 3 times daily as needed for Muscle spasms      albuterol (PROVENTIL HFA;VENTOLIN HFA) 108 (90 BASE) MCG/ACT inhaler Inhale 1 puff into the lungs every 6 hours as needed for Wheezing.  zolpidem (AMBIEN CR) 12.5 MG CR tablet Take 12.5 mg by mouth nightly as needed.  azelastine (ASTELIN) 137 MCG/SPRAY nasal spray 1 spray by Nasal route as needed. Use in each nostril as directed         Shellfish-derived products; Pcn [penicillins];  Succinylcholine; Morphine; and Tape [adhesive tape]  Social History     Tobacco Use   Smoking Status Never Smoker   Smokeless Tobacco Current User    Types: Snuff   Tobacco Comment    quit pipe over 30 yrs ago      (If patient a smoker, smoking cessation counseling offered)   Social History     Substance and Sexual Activity   Alcohol Use No    Alcohol/week: 0.0 standard drinks    Comment: quit       REVIEW OF SYSTEMS:  Constitutional: negative  Eyes: negative  Respiratory: negative  Cardiovascular: negative  Gastrointestinal: negative  Genitourinary: see HPI  Musculoskeletal: negative  Skin: negative   Neurological: negative  Hematological/Lymphatic: negative  Psychological: negative      Physical Exam:    This a 64 y.o. male  Vitals:    10/13/20 1248   Temp: 98.4 °F (36.9 °C)     Body mass index is 47.76 kg/m². Constitutional: Patient in no acute distress;         Assessment and Plan        1. Left testicular pain    2. Benign prostatic hyperplasia with urinary frequency    3. Rising PSA level               Plan:        Improvement with meds but not completely satisfied  Cont BPH meds  Left testicular pain- cipro and urine culture  Follow up in six months          Prescriptions Ordered:  Orders Placed This Encounter   Medications    ciprofloxacin (CIPRO) 500 MG tablet     Sig: Take 1 tablet by mouth 2 times daily for 7 days     Dispense:  14 tablet     Refill:  0      Orders Placed:  Orders Placed This Encounter   Procedures    Culture, Urine     Order Specific Question:   Specify (ex-cath, midstream, cysto, etc)?      Answer:   midstream    POCT Urinalysis No Micro (Auto)            ARTHUR ALI BETHESDA [facility], MD

## 2020-10-15 LAB
ORGANISM: ABNORMAL
URINE CULTURE, ROUTINE: ABNORMAL

## 2020-10-22 ENCOUNTER — OFFICE VISIT (OUTPATIENT)
Dept: PHYSICAL MEDICINE AND REHAB | Age: 60
End: 2020-10-22
Payer: MEDICARE

## 2020-10-22 VITALS
SYSTOLIC BLOOD PRESSURE: 144 MMHG | WEIGHT: 315 LBS | HEIGHT: 73 IN | DIASTOLIC BLOOD PRESSURE: 92 MMHG | BODY MASS INDEX: 41.75 KG/M2 | TEMPERATURE: 97.3 F

## 2020-10-22 PROCEDURE — 99205 OFFICE O/P NEW HI 60 MIN: CPT | Performed by: NURSE PRACTITIONER

## 2020-10-22 PROCEDURE — 3017F COLORECTAL CA SCREEN DOC REV: CPT | Performed by: NURSE PRACTITIONER

## 2020-10-22 PROCEDURE — G8484 FLU IMMUNIZE NO ADMIN: HCPCS | Performed by: NURSE PRACTITIONER

## 2020-10-22 PROCEDURE — G8427 DOCREV CUR MEDS BY ELIG CLIN: HCPCS | Performed by: NURSE PRACTITIONER

## 2020-10-22 PROCEDURE — 4004F PT TOBACCO SCREEN RCVD TLK: CPT | Performed by: NURSE PRACTITIONER

## 2020-10-22 PROCEDURE — G8417 CALC BMI ABV UP PARAM F/U: HCPCS | Performed by: NURSE PRACTITIONER

## 2020-10-22 ASSESSMENT — ENCOUNTER SYMPTOMS
BACK PAIN: 1
CONSTIPATION: 0
NAUSEA: 0
ABDOMINAL DISTENTION: 0
DIARRHEA: 1
SHORTNESS OF BREATH: 1
ANAL BLEEDING: 0
ABDOMINAL PAIN: 0

## 2020-11-11 ENCOUNTER — HOSPITAL ENCOUNTER (OUTPATIENT)
Dept: GENERAL RADIOLOGY | Age: 60
Discharge: HOME | End: 2020-11-11
Payer: MEDICARE

## 2020-11-11 ENCOUNTER — HOSPITAL ENCOUNTER (OUTPATIENT)
Dept: MRI IMAGING | Age: 60
Discharge: HOME | End: 2020-11-11
Payer: MEDICARE

## 2020-11-11 PROCEDURE — 72148 MRI LUMBAR SPINE W/O DYE: CPT

## 2020-11-11 PROCEDURE — 72110 X-RAY EXAM L-2 SPINE 4/>VWS: CPT

## 2020-11-12 ENCOUNTER — TELEMEDICINE (OUTPATIENT)
Dept: PULMONOLOGY | Age: 60
End: 2020-11-12
Payer: MEDICARE

## 2020-11-12 PROCEDURE — 99214 OFFICE O/P EST MOD 30 MIN: CPT | Performed by: PHYSICIAN ASSISTANT

## 2020-11-12 RX ORDER — ROPINIROLE 0.5 MG/1
TABLET, FILM COATED ORAL
Qty: 90 TABLET | Refills: 2 | Status: SHIPPED | OUTPATIENT
Start: 2020-11-12 | End: 2021-05-03 | Stop reason: SDUPTHER

## 2020-11-12 ASSESSMENT — ENCOUNTER SYMPTOMS
SHORTNESS OF BREATH: 0
EYES NEGATIVE: 1
STRIDOR: 0
WHEEZING: 0
DIARRHEA: 0
ALLERGIC/IMMUNOLOGIC NEGATIVE: 1
BACK PAIN: 1
COUGH: 0
CHEST TIGHTNESS: 0
NAUSEA: 0

## 2020-11-12 NOTE — PROGRESS NOTES
[facility]      Dyan Linn         [id number]  11/12/2020   Chief Complaint   Patient presents with    Follow-up     LAURIE        PAP Download:   Original or initial AHI: 49     Date of initial study: 9/12/15    Compliant  100%     Noncompliant 0 %     PAP Type CPAP 15   Avg Hrs/Day [id number]  AHI: 2.1     Machine/Mfg:   [x] ResMed    [] Respironics/Dreamstation   Interface:   [] Nasal    [] Nasal pillows   [] FFM      Provider:      [x] -DAWIT     []Seferino     [] Verna    [] Brenda Navas    [] Luzma               [] [facility]      [] [employer]    [] [address]      [] Other    Here is a scan of the most recent download:          Switchback Sleepiness Score: 12    Presentation:   Mary Ellen Miller presents for sleep medicine follow up for obstructive sleep apnea, restless leg syndrome  Since the last visit, Mary Ellen Miller is doing well with PAP. Requip was increased at last visit for uncontrolled RLS. His RLS is well controlled. He stopped Ambien and doing ok but waking up through the night. He wakes secondary to back pain. He is scheduled to be seen by pain management soon    Equipment issues: The pressure is  acceptable, the mask is acceptable     Sleep issues:  Do you feel better? Yes and No  More rested? Sometimes   Better concentration? yes    Progress History:   Since last visit any new medical issues? No  New ER or hospitlal visits? No  Any new or changes in medicines? No  Any new sleep medicines?  No        Past Medical History:   Diagnosis Date    Arthritis     Back problem     Constipation     Hypertension     Sleep apnea     has CPAP    Thyroid disease        Past Surgical History:   Procedure Laterality Date    APPENDECTOMY      CARPAL TUNNEL RELEASE Bilateral     COLONOSCOPY  2017    Dr. Demetrius Escamilla      x3 surgeries with 14 repairs    KNEE SURGERY Right 1980's    cartilage    [place] EXPLORATORY OF ABDOMEN N/A 2/5/2018    ABDOMINAL EXPLORATION WITH LYSIS OF COMPLICATED ADHESIONS WITH AN EXTENDED RIGHT COLON RESECTION performed by Giuliana Jama MD at [facility] History     Tobacco Use    Smoking status: Never Smoker    Smokeless tobacco: Current User     Types: Snuff    Tobacco comment: quit pipe over 30 yrs ago   Substance Use Topics    Alcohol use: No     Alcohol/week: 0.0 standard drinks     Comment: quit    Drug use: No       Allergies   Allergen Reactions    Shellfish-Derived Products Swelling    Pcn [Penicillins] Itching    Succinylcholine      Pseudocholinesterase Deficiency    Morphine Nausea And Vomiting     \"head swimming, skin crawling\"    Tape [Adhesive Tape] Rash       Current Outpatient Medications   Medication Sig Dispense Refill    rOPINIRole (REQUIP) 0.5 MG tablet Take 1 pill in afternoon and 2 pills at night. 90 tablet 2    oxybutynin (DITROPAN XL) 10 MG extended release tablet Take 1 tablet by mouth daily 90 tablet 3    carvedilol (COREG) 25 MG tablet Take 1 tablet by mouth 2 times daily 180 tablet 3    potassium chloride (MICRO-K) 10 MEQ extended release capsule       furosemide (LASIX) 40 MG tablet Take 1 tablet by mouth daily 90 tablet 3    hydrALAZINE (APRESOLINE) 50 MG tablet Take 1 tablet by mouth every 8 hours 270 tablet 3    spironolactone (ALDACTONE) 50 MG tablet TAKE 1 TABLET BY MOUTH EVERY DAY 90 tablet 3    glimepiride (AMARYL) 1 MG tablet Take 1 mg by mouth 4 times daily      potassium chloride (KLOR-CON M20) 20 MEQ extended release tablet Take 2 tablets by mouth daily (Patient taking differently: Take 40 mEq by mouth daily 4 times a day) 180 tablet 1    tamsulosin (FLOMAX) 0.4 MG capsule Take 1 capsule by mouth nightly 90 capsule 3    acetaminophen (TYLENOL) 500 MG tablet Take 1 tablet by mouth every 6 hours as needed for Pain 40 tablet 0    oxyCODONE-acetaminophen (PERCOCET)  MG per tablet Take 1 tablet by mouth every 6 hours as needed for Pain.       cloNIDine (CATAPRES) 0.2 MG tablet Take 0.2 mg by mouth 3 times daily      levothyroxine (SYNTHROID) 175 MCG tablet Take 175 mcg by mouth Daily      pantoprazole (PROTONIX) 40 MG tablet Take 1 tablet by mouth every morning (before breakfast) (Patient taking differently: Take 40 mg by mouth as needed ) 30 tablet 0    cyclobenzaprine (FLEXERIL) 10 MG tablet Take 10 mg by mouth 3 times daily as needed for Muscle spasms      albuterol (PROVENTIL HFA;VENTOLIN HFA) 108 (90 BASE) MCG/ACT inhaler Inhale 1 puff into the lungs every 6 hours as needed for Wheezing.  azelastine (ASTELIN) 137 MCG/SPRAY nasal spray 1 spray by Nasal route as needed. Use in each nostril as directed       No current facility-administered medications for this visit. Family History   Problem Relation Age of Onset    Cancer Mother         breast    Heart Disease Father         bladder, lung    Cancer Father     Stroke Brother     Diabetes Paternal Grandmother     High Blood Pressure Neg Hx         Review of Systems -   Review of Systems   Constitutional: Negative for activity change, appetite change, chills and fever. HENT: Negative for congestion and postnasal drip. Eyes: Negative. Respiratory: Negative for cough, chest tightness, shortness of breath, wheezing and stridor. Cardiovascular: Negative for chest pain and leg swelling. Gastrointestinal: Negative for diarrhea and nausea. Endocrine: Negative. Genitourinary: Negative. Musculoskeletal: Positive for arthralgias and back pain. Skin: Negative. Allergic/Immunologic: Negative. Neurological: Negative. Negative for dizziness and light-headedness. Psychiatric/Behavioral: Negative. All other systems reviewed and are negative. Physical Exam:    BMI:  There is no height or weight on file to calculate BMI.     Wt Readings from Last 3 Encounters:   10/22/20 (!) 362 lb (164.2 kg)   10/13/20 (!) 362 lb (164.2 kg)   08/27/20 (!) 358 lb 9.6 oz (162.7 kg)     Weight stable / unchanged  Vitals: There were no vitals taken for this visit. Physical Exam  Constitutional:       Appearance: Normal appearance. He is normal weight. HENT:      Head: Normocephalic and atraumatic. Right Ear: External ear normal.      Left Ear: External ear normal.      Nose: Nose normal.   Eyes:      Extraocular Movements: Extraocular movements intact. Conjunctiva/sclera: Conjunctivae normal.      Pupils: Pupils are equal, round, and reactive to light. Neck:      Musculoskeletal: Normal range of motion and neck supple. Pulmonary:      Effort: Pulmonary effort is normal.   Neurological:      General: No focal deficit present. Mental Status: He is alert and oriented to person, place, and time. Psychiatric:         Attention and Perception: Attention and perception normal.         Mood and Affect: Mood and affect normal.         Speech: Speech normal.         Behavior: Behavior normal. Behavior is cooperative. Thought Content: Thought content normal.         Cognition and Memory: Cognition normal.         Judgment: Judgment normal.           ASSESSMENT/DIAGNOSIS     Diagnosis Orders   1. Obstructive sleep apnea     2. RLS (restless legs syndrome)     3. Other insomnia     4. Hypersomnia              Plan   Do you need any equipment today? No  - Insomnia secondary to back pain  - Need to improve pain  - Discussed adding Doxepin for insomnia but he prefers to wait to see if sleep improves with pain control  - Hypersomnia likely related to insomnia  - He  was advised to continue current positive airway pressure therapy with above described pressure.    - He  advised to keep good compliance with current recommended pressure to get optimal results and clinical improvement  - Recommend 7-9 hours of sleep with PAP  - He was advised to call [employer] regarding supplies if needed.   -He call my office for earlier appointment if needed for worsening of sleep symptoms.   - He was instructed

## 2020-11-19 ENCOUNTER — OFFICE VISIT (OUTPATIENT)
Dept: CARDIOLOGY CLINIC | Age: 60
End: 2020-11-19
Payer: MEDICARE

## 2020-11-19 VITALS
BODY MASS INDEX: 41.75 KG/M2 | HEIGHT: 73 IN | HEART RATE: 82 BPM | DIASTOLIC BLOOD PRESSURE: 106 MMHG | WEIGHT: 315 LBS | SYSTOLIC BLOOD PRESSURE: 200 MMHG

## 2020-11-19 PROCEDURE — 99213 OFFICE O/P EST LOW 20 MIN: CPT | Performed by: INTERNAL MEDICINE

## 2020-11-19 PROCEDURE — 3017F COLORECTAL CA SCREEN DOC REV: CPT | Performed by: INTERNAL MEDICINE

## 2020-11-19 PROCEDURE — 4004F PT TOBACCO SCREEN RCVD TLK: CPT | Performed by: INTERNAL MEDICINE

## 2020-11-19 PROCEDURE — G8484 FLU IMMUNIZE NO ADMIN: HCPCS | Performed by: INTERNAL MEDICINE

## 2020-11-19 PROCEDURE — G8417 CALC BMI ABV UP PARAM F/U: HCPCS | Performed by: INTERNAL MEDICINE

## 2020-11-19 PROCEDURE — 93000 ELECTROCARDIOGRAM COMPLETE: CPT | Performed by: INTERNAL MEDICINE

## 2020-11-19 PROCEDURE — G8428 CUR MEDS NOT DOCUMENT: HCPCS | Performed by: INTERNAL MEDICINE

## 2020-11-19 RX ORDER — HYDRALAZINE HYDROCHLORIDE 50 MG/1
75 TABLET, FILM COATED ORAL EVERY 8 HOURS SCHEDULED
Qty: 270 TABLET | Refills: 3 | Status: ON HOLD | OUTPATIENT
Start: 2020-11-19 | End: 2021-03-03 | Stop reason: SDUPTHER

## 2020-11-19 RX ORDER — ASPIRIN 81 MG/1
81 TABLET ORAL DAILY
Qty: 90 TABLET | Refills: 1 | Status: SHIPPED | OUTPATIENT
Start: 2020-11-19 | End: 2021-04-19

## 2020-11-19 NOTE — PROGRESS NOTES
[address] [place] [address],[place] [address] [address] [place] [address]  Dept: [phone]  Dept Fax: [phone]  Loc: [phone]    Visit Date: 11/19/2020    Mr. Preet Gilbert is a 64 y.o. male  who presented for:  Chief Complaint   Patient presents with   Christinia Pester Cardiomyopathy       HPI:   63 yo M  c hx of HTN, DM, Obesity, sleep apnea on CPAP, Cardiomyopathy, colon resection 2/2017 is here for a follow up. He reports chest pain , midsternal about 2 weeks ago, radiated to the left arm, maybe aggravated by exertion, no alleviating factors, also associated with palpitations. Also reports usual sweats. Has had aloto f back pain, on percocet.   BP in 199/107    Current Outpatient Medications:     hydrALAZINE (APRESOLINE) 50 MG tablet, Take 1.5 tablets by mouth every 8 hours, Disp: 270 tablet, Rfl: 3    rOPINIRole (REQUIP) 0.5 MG tablet, Take 1 pill in afternoon and 2 pills at night., Disp: 90 tablet, Rfl: 2    oxybutynin (DITROPAN XL) 10 MG extended release tablet, Take 1 tablet by mouth daily, Disp: 90 tablet, Rfl: 3    carvedilol (COREG) 25 MG tablet, Take 1 tablet by mouth 2 times daily, Disp: 180 tablet, Rfl: 3    potassium chloride (MICRO-K) 10 MEQ extended release capsule, , Disp: , Rfl:     furosemide (LASIX) 40 MG tablet, Take 1 tablet by mouth daily, Disp: 90 tablet, Rfl: 3    spironolactone (ALDACTONE) 50 MG tablet, TAKE 1 TABLET BY MOUTH EVERY DAY, Disp: 90 tablet, Rfl: 3    glimepiride (AMARYL) 1 MG tablet, Take 1 mg by mouth 4 times daily, Disp: , Rfl:     potassium chloride (KLOR-CON M20) 20 MEQ extended release tablet, Take 2 tablets by mouth daily (Patient taking differently: Take 40 mEq by mouth daily 4 times a day), Disp: 180 tablet, Rfl: 1    acetaminophen (TYLENOL) 500 MG tablet, Take 1 tablet by mouth every 6 hours as needed for Pain, Disp: 40 tablet, Rfl: 0    oxyCODONE-acetaminophen (PERCOCET)  MG per tablet, Take 1 tablet by mouth every 6 hours as needed for Pain., Disp: , Rfl:     cloNIDine (CATAPRES) 0.2 MG tablet, Take 0.2 mg by mouth 3 times daily, Disp: , Rfl:     levothyroxine (SYNTHROID) 175 MCG tablet, Take 175 mcg by mouth Daily, Disp: , Rfl:     pantoprazole (PROTONIX) 40 MG tablet, Take 1 tablet by mouth every morning (before breakfast) (Patient taking differently: Take 40 mg by mouth as needed ), Disp: 30 tablet, Rfl: 0    cyclobenzaprine (FLEXERIL) 10 MG tablet, Take 10 mg by mouth 3 times daily as needed for Muscle spasms, Disp: , Rfl:     albuterol (PROVENTIL HFA;VENTOLIN HFA) 108 (90 BASE) MCG/ACT inhaler, Inhale 1 puff into the lungs every 6 hours as needed for Wheezing., Disp: , Rfl:     azelastine (ASTELIN) 137 MCG/SPRAY nasal spray, 1 spray by Nasal route as needed. Use in each nostril as directed, Disp: , Rfl:     tamsulosin (FLOMAX) 0.4 MG capsule, Take 1 capsule by mouth nightly, Disp: 90 capsule, Rfl: 3    Past Medical History  Bari Skiff  has a past medical history of Arthritis, Back problem, Constipation, Hypertension, Sleep apnea, and Thyroid disease. Social History  Bari Skiff  reports that he has never smoked. His smokeless tobacco use includes snuff. He reports that he does not drink alcohol or use drugs. Family History  Bari Skiff family history includes Cancer in his father and mother; Diabetes in his paternal grandmother; Heart Disease in his father; Stroke in his brother.     Past Surgical History   Past Surgical History:   Procedure Laterality Date    APPENDECTOMY      CARPAL TUNNEL RELEASE Bilateral     COLONOSCOPY  2017    Dr. Albarran Precise      x3 surgeries with 14 repairs    KNEE SURGERY Right 1980's    cartilage    OK EXPLORATORY OF ABDOMEN N/A 2/5/2018    ABDOMINAL EXPLORATION WITH LYSIS OF COMPLICATED ADHESIONS WITH AN EXTENDED RIGHT COLON RESECTION performed by Pamela Hollins MD at [address] [place] [address] Place:     REVIEW OF SYSTEMS  Constitutional: denies sweats, chills and fever  HENT: denies  congestion, sinus pressure, sneezing and sore throat. Eyes: denies  pain, discharge, redness and itching. Respiratory: denies apnea, cough  Gastrointestinal: denies blood in stool, constipation, diarrhea   Endocrine: denies cold intolerance, heat intolerance, polydipsia. Genitourinary: denies dysuria, enuresis, flank pain and hematuria. Musculoskeletal: denies arthralgias, joint swelling and neck pain. Neurological: denies numbness and headaches. Psychiatric/Behavioral: denies agitation, confusion, decreased concentration and dysphoric mood    All others reviewed and are negative. Objective:     BP (!) 199/107   Pulse 80   Ht 6' 1\" (1.854 m)   Wt (!) 367 lb 2 oz (166.5 kg)   BMI 48.44 kg/m²     Wt Readings from Last 3 Encounters:   11/19/20 (!) 367 lb 2 oz (166.5 kg)   10/22/20 (!) 362 lb (164.2 kg)   10/13/20 (!) 362 lb (164.2 kg)     BP Readings from Last 3 Encounters:   11/19/20 (!) 199/107   10/22/20 (!) 144/92   08/27/20 (!) 150/86       PHYSICAL EXAM  Constitutional: Oriented to person, place, and time. Appears well-developed and well-nourished. HENT:   Head: Normocephalic and atraumatic. Eyes: EOM are normal. Pupils are equal, round, and reactive to light. Neck: Normal range of motion. Neck supple. No JVD present. Cardiovascular: Normal rate , normal heart sounds and intact distal pulses. Pulmonary/Chest: Effort normal and breath sounds normal. No respiratory distress. No wheezes. No rales. Abdominal: Soft. Bowel sounds are normal. No distension. There is no tenderness. Musculoskeletal: Normal range of motion. No edema. Neurological: Alert and oriented to person, place, and time. No cranial nerve deficit. Coordination normal.   Skin: Skin is warm and dry. Psychiatric: Normal mood and affect.        No results found for: CKTOTAL, CKMB, CKMBINDEX    Lab Results   Component Value Date    WBC 13.5 07/02/2019    RBC 5.58 07/02/2019    RBC 5.15 08/12/2011 HGB 16.3 07/02/2019    HCT 48.0 07/02/2019    MCV 86.0 07/02/2019    MCH 29.2 07/02/2019    MCHC 34.0 07/02/2019    RDW 14.7 02/09/2018     07/02/2019    MPV 9.3 07/02/2019       Lab Results   Component Value Date     07/16/2020    K 4.2 07/16/2020    K 5.6 07/02/2019    CL 99 07/16/2020    CO2 27 07/16/2020    BUN 11 07/16/2020    LABALBU 3.9 07/02/2019    CREATININE 0.9 07/16/2020    CALCIUM 9.2 07/16/2020    LABGLOM 87 07/16/2020    GLUCOSE 173 07/16/2020       Lab Results   Component Value Date    ALKPHOS 91 07/02/2019    ALT 28 07/02/2019    AST 30 07/02/2019    PROT 7.9 07/02/2019    BILITOT 0.5 07/02/2019    BILIDIR <0.2 09/06/2017    LABALBU 3.9 07/02/2019       Lab Results   Component Value Date    MG 2.1 08/12/2019       Lab Results   Component Value Date    INR 1.03 07/02/2019    INR 1.15 (H) 09/21/2017         Lab Results   Component Value Date    LABA1C 6.7 04/13/2019       Lab Results   Component Value Date    TRIG 95 06/27/2018    HDL 34 06/27/2018    LDLCALC 71 06/27/2018       Lab Results   Component Value Date    TSH 8.210 04/11/2019         Testing Reviewed:      I haveindividually reviewed the below cardiac tests    EKG:    ECHO:   Results for orders placed during the hospital encounter of 04/26/19   ECHO Complete 2D W Doppler W Color    Narrative Transthoracic Echocardiography Report (TTE)     Demographics      Patient Name   Lorie Tipton  Gender              Male                  A      MR #           [id number]       Race                                                     Ethnicity      Account #      [de-identified]       Room Number      Accession      [id number]       Date of Study       04/26/2019   Number      Date of Birth  1960      Referring Physician Jonathan Jay Sport DO      Age            66 year(s)      [address], [facility]                                     Interpreting        Maryanne Olmstead MD                                  Physician     Procedure    Type of Study      TTE procedure:ECHOCARDIOGRAM COMPLETE 2D W DOPPLER W COLOR. Procedure Date  Date: 04/26/2019 Start: 07:22 AM    Study Location: Echo Lab  Technical Quality: Poor visualization due to body habitus. Indications:Dyspnea on exertion. Additional Medical History:Sleep apnea, hypertension, hypothyroidism    Patient Status: Routine    Height: 73 inches Weight: 352.01 pounds BSA: 2.74 m^2 BMI: 46.44 kg/m^2    BP: 182/92 mmHg     Conclusions      Summary   Technically difficult examination. Left ventricular size is normal and systolic function is mildly reduced. Ejection fraction was estimated at 40-45%. LV wall thickness is within   normal limits. The right ventricular size appears normal with normal systolic function   and wall thickness. Signature      ----------------------------------------------------------------   Electronically signed by Maryanne Olmstead MD (Interpreting   physician) on 04/26/2019 at 05:05 PM   ----------------------------------------------------------------      Findings      Mitral Valve   The mitral valve was not well visualized . DOPPLER: The transmitral velocity was within the normal range with no   evidence for mitral stenosis. There was no evidence of mitral   regurgitation. Aortic Valve   The aortic valve leaflets were not well visualized. DOPPLER: Transaortic velocity was within the normal range with no evidence   of aortic stenosis. There was no evidence of aortic regurgitation. Tricuspid Valve   Tricuspid valve was not well visualized. DOPPLER: There is no evidence of tricuspid stenosis. There was no evidence   of tricuspid regurgitation. Pulmonic Valve   The pulmonic valve was not well visualized . Trace Suazo DOPPLER: The transpulmonic velocity was within the normal range.  No   evidence for regurgitation. Left Atrium   Left atrial size is normal.      Left Ventricle   Left ventricular size is normal and systolic function is mildly reduced. Ejection fraction was estimated at 40-45%. LV wall thickness is within   normal limits. Right Atrium   Right atrial size was normal.      Right Ventricle   The right ventricular size appears normal with normal systolic function   and wall thickness. Pericardial Effusion   The pericardium appears normal with no evidence of a pericardial effusion. Pleural Effusion   No evidence of pleural effusion. Aorta / Great Vessels   -Aortic root dimension within normal limits. -IVC size is within normal limits with normal respiratory phasic changes.      M-Mode/2D Measurements & Calculations      LV Diastolic   LV Systolic Dimension:    AV Cusp Separation: 2.3 cmLA   Dimension: 5.1 3.9 cm                    Dimension: 4.8 cmAO Root   cm             LV Volume Diastolic: 230  Dimension: 3.8 cmLA Area: 20.8   LV FS:23.5 %   ml                        cm^2   LV PW          LV Volume Systolic: 80.4   Diastolic: 1.4 ml   cm             LV EDV/LV EDV Index: 124   Septum         ml/45 m^2LV ESV/LV ESV    RV Diastolic Dimension: 3.4 cm   Diastolic: 1.4 Index: 53.7 ml/24 m^2   cm             EF Calculated: 46.9 %     LA/Aorta: 1.26                                            Ascending Aorta: 3.6 cm                                            LA volume/Index: 51.9 ml /19m^2     Doppler Measurements & Calculations      MV Peak E-Wave: 89.7  AV Peak Velocity: 155 LVOT Peak Velocity: 109 cm/s   cm/s                  cm/s                  LVOT Mean Velocity: 71.6 cm/s   MV Peak A-Wave: 81.9  AV Peak Gradient:     LVOT Peak Gradient: 5 mmHgLVOT   cm/s                  9.61 mmHg             Mean Gradient: 2 mmHg   MV E/A Ratio: 1.1     AV Mean Velocity:   MV Peak Gradient:     91.2 cm/s             TV Peak E-Wave: 29.8 cm/s   3.22 mmHg             AV Mean Gradient: 4   TV effects were discussed in details.     Return in about 6 months (around 5/19/2021) for Regular follow up, Review testing.        Electronically signed by MD Megan Mayo  11/19/2020 at 1:12 PM

## 2020-11-19 NOTE — PROGRESS NOTES
Pt here for   check up     Pt c/o heart palpitations, chest pain, sob , more fatigue, swelling in legs and feet     Pt denies dizziness     Pt states med list is correct from recent  appt 11/12/20    EKG done today

## 2020-11-20 ENCOUNTER — TELEPHONE (OUTPATIENT)
Dept: CARDIOLOGY CLINIC | Age: 60
End: 2020-11-20

## 2020-11-20 NOTE — TELEPHONE ENCOUNTER
Heart cath scheduled 12-01-20 @ 9:00am  covid screen 11-25-20    Left msg for pt to call office for date, time and instructions

## 2020-11-23 RX ORDER — SPIRONOLACTONE 50 MG/1
TABLET, FILM COATED ORAL
Qty: 30 TABLET | Refills: 8 | Status: SHIPPED | OUTPATIENT
Start: 2020-11-23 | End: 2021-02-04

## 2020-11-23 NOTE — TELEPHONE ENCOUNTER
Left message for pt to call office for date, time and instructions for heart cath  Needs covid screening

## 2020-11-24 ENCOUNTER — OFFICE VISIT (OUTPATIENT)
Dept: PHYSICAL MEDICINE AND REHAB | Age: 60
End: 2020-11-24
Payer: MEDICARE

## 2020-11-24 VITALS
HEART RATE: 70 BPM | HEIGHT: 73 IN | BODY MASS INDEX: 41.75 KG/M2 | DIASTOLIC BLOOD PRESSURE: 100 MMHG | SYSTOLIC BLOOD PRESSURE: 174 MMHG | TEMPERATURE: 97.4 F | WEIGHT: 315 LBS

## 2020-11-24 PROCEDURE — G8427 DOCREV CUR MEDS BY ELIG CLIN: HCPCS | Performed by: NURSE PRACTITIONER

## 2020-11-24 PROCEDURE — 3017F COLORECTAL CA SCREEN DOC REV: CPT | Performed by: NURSE PRACTITIONER

## 2020-11-24 PROCEDURE — 4004F PT TOBACCO SCREEN RCVD TLK: CPT | Performed by: NURSE PRACTITIONER

## 2020-11-24 PROCEDURE — G8484 FLU IMMUNIZE NO ADMIN: HCPCS | Performed by: NURSE PRACTITIONER

## 2020-11-24 PROCEDURE — 99214 OFFICE O/P EST MOD 30 MIN: CPT | Performed by: NURSE PRACTITIONER

## 2020-11-24 PROCEDURE — G8417 CALC BMI ABV UP PARAM F/U: HCPCS | Performed by: NURSE PRACTITIONER

## 2020-11-24 ASSESSMENT — ENCOUNTER SYMPTOMS
SHORTNESS OF BREATH: 1
ABDOMINAL DISTENTION: 0
DIARRHEA: 1
ABDOMINAL PAIN: 0
CONSTIPATION: 0
ANAL BLEEDING: 0
BACK PAIN: 1
NAUSEA: 0

## 2020-11-24 NOTE — PROGRESS NOTES
some. States that it is helping with neck pain     Having right shoulder pain that started about 3 weeks ago worse with raising arm started when he was moving wood working equipment in basement. Blood pressure is elevated today, patient has had increased SOB  And intermittent chest pains and is scheduled for Cardiac cath next week     Continues Percocet 10/325 TID from pcp   Medications reviewed. Patient denies side effects with medications. Patient states he is taking medications as prescribed. Hedenies receiving pain medications from other sources. He denies any ER visits since last visit. Pain scale with out pain medications or at its worst is 7/-810. Pain scale with pain medications or at its best is 5-6/10. Last dose of Percocet was yesterday  Drug screen reviewed from 10/22/2020 and was not appropriate, + for hydrocodone but not oxycodone     L-xray:  1. Moderately severe hypertrophic vertebral body spondylosis scattered in the lumbar spine with multiple bulky bridging osteophytes. Mild disc space narrowing L1-2 and L2-3. Moderate disc space narrowing L5-S1 with associated degenerative disc disease. No acute fractures seen. 2. Mild retrolisthesis L2 upon L3, 5 mm. Mild retrolisthesis L3 upon L4, 4 mm. 3. Moderate degenerative facet arthropathy L5-S1 bilaterally. Sacroiliac joints unremarkable. 4. Overall appearance of lumbar spine has worsened since prior study.             L-MRI:   FINDINGS:              The lumbar vertebral bodies are normally aligned.  There are degenerative marrow changes in the endplates. There is disc desiccation throughout. There is no bone marrow edema.  There are no compression fractures.  No pars defects are noted.         The distal spinal cord, conus medullaris and cauda equina are normal.         There are no gross abnormalities in the visualized aspects of the distal thoracic spine.         On the axial images, at T12-L1, there is no focal disc abnormality. There are mild facet degenerative changes. There is no spinal canal or foraminal stenosis.         At L1-L2, there is a diffuse disc bulge. There are mild facet degenerative changes. There is moderate severity spinal canal stenosis. There is no significant foraminal stenosis.         At L2-L3, there is a diffuse disc bulge. There are facet degenerative changes. There is moderate severity spinal canal stenosis. There is mild bilateral foraminal stenosis.         At L3-L4, there is a diffuse disc bulge. There are facet degenerative changes. There is moderate severity spinal canal stenosis. There is mild bilateral foraminal stenosis.         At L4-L5, there is a diffuse disc bulge. There are facet degenerative changes. There is moderate to severe stenosis of the thecal sac. There is stenosis of the subarticular recesses. There is severe right and moderate severity left foraminal stenosis.         At L5-S1, there is a diffuse disc bulge. There is no stenosis of the thecal sac. There is some stenosis of the subarticular recesses. There is severe bilateral foraminal stenosis.         There are no suspicious findings in the visualized aspects of the retroperitoneum and paraspinal soft tissues.                   Impression         1. Severe bilateral foraminal stenosis at the L5-S1 level due to a diffusely bulging discs and facet degenerative changes. 2. Moderate to severe stenosis of the thecal sac at the L4-5 level with severe right and moderate severity left foraminal stenosis. 3. Spinal canal or foraminal stenosis at the other lumbar levels as described.             The patientis allergic to shellfish-derived products; pcn [penicillins]; succinylcholine; morphine; and tape [adhesive tape]. Past Medical History  Roberto Landon  has a past medical history of Arthritis, Back problem, Constipation, Hypertension, Sleep apnea, and Thyroid disease.     Past Surgical History  The patient  has a past surgical history that includes Appendectomy; knee surgery (Right, 1980's); Carpal tunnel release (Bilateral); Colonoscopy (2017); hernia repair; and pr exploratory of abdomen (N/A, 2/5/2018). Family History  This patient's family history includes Cancer in his father and mother; Diabetes in his paternal grandmother; Heart Disease in his father; Stroke in his brother. Social History  Josh Oliver  reports that he has never smoked. His smokeless tobacco use includes snuff. He reports that he does not drink alcohol or use drugs.     Medications    Current Outpatient Medications:     spironolactone (ALDACTONE) 50 MG tablet, TAKE 1 TABLET BY MOUTH EVERY DAY, Disp: 30 tablet, Rfl: 8    hydrALAZINE (APRESOLINE) 50 MG tablet, Take 1.5 tablets by mouth every 8 hours, Disp: 270 tablet, Rfl: 3    aspirin EC 81 MG EC tablet, Take 1 tablet by mouth daily, Disp: 90 tablet, Rfl: 1    rOPINIRole (REQUIP) 0.5 MG tablet, Take 1 pill in afternoon and 2 pills at night., Disp: 90 tablet, Rfl: 2    oxybutynin (DITROPAN XL) 10 MG extended release tablet, Take 1 tablet by mouth daily, Disp: 90 tablet, Rfl: 3    carvedilol (COREG) 25 MG tablet, Take 1 tablet by mouth 2 times daily, Disp: 180 tablet, Rfl: 3    potassium chloride (MICRO-K) 10 MEQ extended release capsule, , Disp: , Rfl:     furosemide (LASIX) 40 MG tablet, Take 1 tablet by mouth daily, Disp: 90 tablet, Rfl: 3    glimepiride (AMARYL) 1 MG tablet, Take 1 mg by mouth 4 times daily, Disp: , Rfl:     potassium chloride (KLOR-CON M20) 20 MEQ extended release tablet, Take 2 tablets by mouth daily (Patient taking differently: Take 40 mEq by mouth daily 4 times a day), Disp: 180 tablet, Rfl: 1    tamsulosin (FLOMAX) 0.4 MG capsule, Take 1 capsule by mouth nightly, Disp: 90 capsule, Rfl: 3    acetaminophen (TYLENOL) 500 MG tablet, Take 1 tablet by mouth every 6 hours as needed for Pain, Disp: 40 tablet, Rfl: 0    oxyCODONE-acetaminophen (PERCOCET)  MG per tablet, Take 1 tablet by mouth every 6 hours as needed for Pain., Disp: , Rfl:     cloNIDine (CATAPRES) 0.2 MG tablet, Take 0.2 mg by mouth 3 times daily, Disp: , Rfl:     levothyroxine (SYNTHROID) 175 MCG tablet, Take 175 mcg by mouth Daily, Disp: , Rfl:     pantoprazole (PROTONIX) 40 MG tablet, Take 1 tablet by mouth every morning (before breakfast) (Patient taking differently: Take 40 mg by mouth as needed ), Disp: 30 tablet, Rfl: 0    cyclobenzaprine (FLEXERIL) 10 MG tablet, Take 10 mg by mouth 3 times daily as needed for Muscle spasms, Disp: , Rfl:     albuterol (PROVENTIL HFA;VENTOLIN HFA) 108 (90 BASE) MCG/ACT inhaler, Inhale 1 puff into the lungs every 6 hours as needed for Wheezing., Disp: , Rfl:     azelastine (ASTELIN) 137 MCG/SPRAY nasal spray, 1 spray by Nasal route as needed. Use in each nostril as directed, Disp: , Rfl:     Subjective:      Review of Systems   Constitutional: Positive for activity change. Negative for appetite change, chills, diaphoresis, fatigue, fever and unexpected weight change. HENT: Negative. Eyes: Positive for visual disturbance. Wears corrective glasses    Respiratory: Positive for shortness of breath. Shortness of breath with exertion    Cardiovascular: Positive for leg swelling. Negative for chest pain and palpitations. Gastrointestinal: Positive for diarrhea. Negative for abdominal distention, abdominal pain, anal bleeding, constipation and nausea. Endocrine: Negative. Genitourinary: Negative. At times incontinence    Musculoskeletal: Positive for arthralgias, back pain, gait problem, myalgias, neck pain and neck stiffness. Negative for joint swelling. Uses a cane at times but not today    Skin: Negative. Neurological: Positive for weakness and numbness. Hematological: Negative. Psychiatric/Behavioral: Positive for dysphoric mood.        Objective:     Vitals:    11/24/20 1058 11/24/20 1102   BP: (!) 170/98 (!) 174/100   Site: Left Upper Arm Right Upper Arm   Position: Sitting    Cuff Size: Large Adult    Pulse: 70    Temp: 97.4 °F (36.3 °C)    TempSrc: Temporal    Weight: (!) 367 lb (166.5 kg)    Height: 6' 1\" (1.854 m)        Physical Exam  Constitutional:       General: He is not in acute distress. Appearance: Normal appearance. He is obese. He is not ill-appearing, toxic-appearing or diaphoretic. HENT:      Head: Normocephalic and atraumatic. Right Ear: External ear normal. There is no impacted cerumen. Left Ear: External ear normal. There is no impacted cerumen. Nose: Nose normal. No congestion or rhinorrhea. Mouth/Throat:      Mouth: Mucous membranes are dry. Pharynx: Oropharynx is clear. Eyes:      Extraocular Movements: Extraocular movements intact. Conjunctiva/sclera: Conjunctivae normal.      Pupils: Pupils are equal, round, and reactive to light. Neck:      Musculoskeletal: Neck supple. Muscular tenderness present. Cardiovascular:      Rate and Rhythm: Normal rate and regular rhythm. Pulses: Normal pulses. Heart sounds: Murmur present. Pulmonary:      Effort: Pulmonary effort is normal. No respiratory distress. Breath sounds: Normal breath sounds. Abdominal:      General: Abdomen is flat. Bowel sounds are normal. There is no distension. Palpations: Abdomen is soft. There is no mass. Tenderness: There is no abdominal tenderness. Hernia: No hernia is present. Musculoskeletal:         General: Tenderness present. Right shoulder: He exhibits decreased range of motion, tenderness and pain. Right wrist: He exhibits decreased range of motion and tenderness. Left wrist: He exhibits decreased range of motion and tenderness. Right hip: He exhibits decreased range of motion and decreased strength. Left hip: He exhibits decreased range of motion and decreased strength. Right ankle: He exhibits decreased range of motion and swelling. Tenderness.  Achilles tendon exhibits pain. Left ankle: Tenderness. Achilles tendon exhibits pain. Cervical back: He exhibits decreased range of motion, tenderness and bony tenderness. Thoracic back: He exhibits decreased range of motion and bony tenderness. Lumbar back: He exhibits decreased range of motion, tenderness, bony tenderness and pain. Back:       Right lower leg: Edema present. Left lower leg: Edema present. Skin:     General: Skin is warm and dry. Capillary Refill: Capillary refill takes less than 2 seconds. Neurological:      General: No focal deficit present. Mental Status: He is alert and oriented to person, place, and time. Sensory: Sensory deficit present. Motor: Weakness present. Coordination: Romberg sign positive. Coordination abnormal.      Gait: Gait abnormal.      Deep Tendon Reflexes:      Reflex Scores:       Tricep reflexes are 2+ on the right side and 2+ on the left side. Bicep reflexes are 2+ on the right side and 2+ on the left side. Brachioradialis reflexes are 2+ on the right side and 2+ on the left side. Patellar reflexes are 1+ on the right side and 1+ on the left side. Achilles reflexes are 1+ on the right side and 1+ on the left side. Comments: 4/5/strength bilateral lower extremities    + bilateral SLR at 40 degrees    Decraesed sensation bilateral feet    Psychiatric:         Mood and Affect: Mood normal.       SAUNDRA test: + bilaterally   Yeomans test: + bilaterally   Gaenslen test: + bilaterally      Assessment:     1. Lumbosacral radiculitis    2. Spinal stenosis of lumbar region with neurogenic claudication    3. Spondylosis of lumbar region without myelopathy or radiculopathy    4. Lumbar facet arthropathy    5. Neuropathy    6. Neck pain    7. Chronic right shoulder pain    8. Chronic pain syndrome            Plan:      · OARRS reviewed. Current MED: 45.00  · Patient was offered naloxone for home. · Discussed long term side effects of medications, tolerance, dependency and addiction. · Previous UDS reviewed  · UDS preformed today for compliance. · Patient told can not receive any pain medications from any other source. · No evidence of abuse, diversion or aberrant behavior.  Medications and/or procedures to improve function and quality of life- patient understanding with this and that may not be pain free   Discussed with patient about safe storage of medications at home   Discussed possible weaning of medication dosing dependent on treatment/procedure results.  Discussed with patient about risks with procedure including infection, reaction to medication, increased pain, or bleeding.  Reviewed pcp notes in detail   Reviewed L-Xray and L-MRI in detail   Discussed L-facet MBB, LESI possibly in future but currently patient has uncontrolled HTN and is scheduled for Cardiac cath next week   · Continue current medications from pcp- Neurontin and  Percocet 10/325 TID prn, flexeril prn   · Ordered updated UDS as last one was not appropriate and discussed as patient wants us to take over in the future   · Continue PT  · Ordered right shoulder xray and cervical xray again d/t pain       Meds. Prescribed:   No orders of the defined types were placed in this encounter. Return in about 1 month (around 12/24/2020), or if symptoms worsen or fail to improve, for follow up  for medications. Time spent with patient was 25 minutes, more than 50% was spent Counseling/coordinated the patient'scare.       Electronically signed by SAHIL Reyes CNP on11/24/2020 at 11:21 AM

## 2020-11-25 NOTE — TELEPHONE ENCOUNTER
[employer] is unable to approve cath. Asking for patient to have an ECHO prior. Called insurance back at [phone]. [facility]#[id number]. Patient had an ECHO April 2019. Waited on hold for 15 minutes. Insurance company hung up phone. Was unable to speak to anyone.

## 2020-11-27 ENCOUNTER — HOSPITAL ENCOUNTER (OUTPATIENT)
Age: 60
Setting detail: SPECIMEN
Discharge: HOME | End: 2020-11-27
Payer: MEDICARE

## 2020-11-27 ENCOUNTER — HOSPITAL ENCOUNTER (OUTPATIENT)
Age: 60
End: 2020-11-27
Payer: MEDICARE

## 2020-11-27 PROCEDURE — U0003 INFECTIOUS AGENT DETECTION BY NUCLEIC ACID (DNA OR RNA); SEVERE ACUTE RESPIRATORY SYNDROME CORONAVIRUS 2 (SARS-COV-2) (CORONAVIRUS DISEASE [COVID-19]), AMPLIFIED PROBE TECHNIQUE, MAKING USE OF HIGH THROUGHPUT TECHNOLOGIES AS DESCRIBED BY CMS-2020-01-R: HCPCS

## 2020-11-29 ENCOUNTER — PATIENT MESSAGE (OUTPATIENT)
Dept: PULMONOLOGY | Age: 60
End: 2020-11-29

## 2020-11-30 ENCOUNTER — PREP FOR PROCEDURE (OUTPATIENT)
Dept: CARDIOLOGY | Age: 60
End: 2020-11-30

## 2020-11-30 LAB
PERFORMING LAB: NORMAL
REPORT: NORMAL
SARS-COV-2: NOT DETECTED

## 2020-11-30 RX ORDER — SODIUM CHLORIDE 0.9 % (FLUSH) 0.9 %
10 SYRINGE (ML) INJECTION EVERY 12 HOURS SCHEDULED
Status: CANCELLED | OUTPATIENT
Start: 2020-11-30

## 2020-11-30 RX ORDER — SODIUM CHLORIDE 0.9 % (FLUSH) 0.9 %
10 SYRINGE (ML) INJECTION PRN
Status: CANCELLED | OUTPATIENT
Start: 2020-11-30

## 2020-11-30 RX ORDER — SODIUM CHLORIDE 9 MG/ML
INJECTION, SOLUTION INTRAVENOUS CONTINUOUS
Status: CANCELLED | OUTPATIENT
Start: 2020-11-30

## 2020-11-30 RX ORDER — AMITRIPTYLINE HYDROCHLORIDE 10 MG/1
10 TABLET, FILM COATED ORAL NIGHTLY
Qty: 30 TABLET | Refills: 5 | Status: SHIPPED | OUTPATIENT
Start: 2020-11-30 | End: 2021-03-30 | Stop reason: SDUPTHER

## 2020-11-30 RX ORDER — ASPIRIN 325 MG
325 TABLET ORAL ONCE
Status: CANCELLED | OUTPATIENT
Start: 2020-11-30 | End: 2020-11-30

## 2020-11-30 RX ORDER — NITROGLYCERIN 0.4 MG/1
0.4 TABLET SUBLINGUAL EVERY 5 MIN PRN
Status: CANCELLED | OUTPATIENT
Start: 2020-11-30

## 2020-11-30 NOTE — TELEPHONE ENCOUNTER
From: Jewell Myles  To: Radha Cintron PA-C  Sent: 11/29/2020 9:13 PM EST  Subject: Prescription Question    Good afternoon Miss Beronica Oglesby   When we last spoke you said that you could probably get me something that's non habit forming for sleep I would have greatly appreciate if you could over the last two weeks I have only slept maybe 3 or 4 hours a night due to the pain and discomfort if you can I would greatly appreciate that if not I understand thank you Pia Giraldo

## 2020-12-08 ENCOUNTER — OFFICE VISIT (OUTPATIENT)
Dept: CARDIOLOGY CLINIC | Age: 60
End: 2020-12-08
Payer: MEDICARE

## 2020-12-08 VITALS
BODY MASS INDEX: 41.75 KG/M2 | HEART RATE: 84 BPM | OXYGEN SATURATION: 95 % | WEIGHT: 315 LBS | HEIGHT: 73 IN | DIASTOLIC BLOOD PRESSURE: 68 MMHG | SYSTOLIC BLOOD PRESSURE: 138 MMHG

## 2020-12-08 PROCEDURE — G8484 FLU IMMUNIZE NO ADMIN: HCPCS | Performed by: NURSE PRACTITIONER

## 2020-12-08 PROCEDURE — 3017F COLORECTAL CA SCREEN DOC REV: CPT | Performed by: NURSE PRACTITIONER

## 2020-12-08 PROCEDURE — G8417 CALC BMI ABV UP PARAM F/U: HCPCS | Performed by: NURSE PRACTITIONER

## 2020-12-08 PROCEDURE — 4004F PT TOBACCO SCREEN RCVD TLK: CPT | Performed by: NURSE PRACTITIONER

## 2020-12-08 PROCEDURE — 99214 OFFICE O/P EST MOD 30 MIN: CPT | Performed by: NURSE PRACTITIONER

## 2020-12-08 PROCEDURE — G8427 DOCREV CUR MEDS BY ELIG CLIN: HCPCS | Performed by: NURSE PRACTITIONER

## 2020-12-08 ASSESSMENT — ENCOUNTER SYMPTOMS
SHORTNESS OF BREATH: 1
ABDOMINAL DISTENTION: 0
COUGH: 0

## 2020-12-08 NOTE — PATIENT INSTRUCTIONS
You may receive a survey regarding the care you received during your visit. Your input is valuable to us. We encourage you to complete and return your survey. We hope you will choose us in the future for your healthcare needs.     Continue:  · Continue current medications  · Daily weights and record  · Fluid restriction of 2 Liters per day  · Limit sodium in diet to around 9760-6538 mg/day  · Monitor BP  · Activity as tolerated     Call the [facility] for any of the following symptoms: [phone]   Weight gain of 2-3 pounds in 1 day or 5 pounds in 1 week   Increased shortness of breath   Shortness of breath while laying down   Cough   Chest pain   Swelling in feet, ankles or legs   Tenderness or bloating in the abdomen   Fatigue    Decreased appetite or nausea    Confusion     ADD Aldactone 1/2 tab (25 mg) in evening    ADD Lasix 1/2 tab (20mg) in evening     Get blood work in 10 days

## 2020-12-08 NOTE — PROGRESS NOTES
Heart Failure Clinic       Visit Date: 12/8/2020  Cardiologist:  Dr. Trinity Avina  Primary Care Physician: Dr. Moi Abad, DO    Lawrence Delong is a 64 y.o. male who presents today for:  Chief Complaint   Patient presents with    Congestive Heart Failure       HPI:   Lawrence Delong is a 64 y.o. male who presents to the office for a NEW patient visit in the heart failure clinic. Accompanied by no one  Orginally from [place] -  - raising 2 grandkids, 8 & 10  On disability since bowel surgery -  at [address] HF: HFmrEF (40-45%)  Device: no  HX: HTN, DM, LAURIE (CPAP), CKD (Rosia Fogo), Partial colectomy (2017)    Dry Wt:  ???    Hospitalization:    [facility] 11/19 - BP elevated, +CP, SOB  12/1 = [facility] w/ [facility] = nonobstructive disease, elevated LVEDP - given Lasix 80 IV x 1  Low K+ on admission - 2.9 = issues w/ Hypokalemia    Concerns today: Notes SOB/fatigue has slowly worsened over last 3-4 years. Feeling better since [facility], walked from entrance today. Feelng about 35-40% improved  D/c on same dose of Lasix and Aldactone = has been on for \"years\"  Issues on/off w/ kidney function = some fluid losses d/t chronic diarrhea. Finally settling down. Activity: Walked from parking lot, no break. Maintains household, cooks, cleans - Raising 2 grandkids. Diet: watching somewhat = doesn't add salt, some processed.   OVERdrinks = over gallon/day    Patient has:  Chest Pain: improved  SOB: improved   Orthopnea/PND: sleeps better in chair = has been back in bed since [facility]  LAURIE: CPAP   Edema: no  Fatigue: improved  Abdominal bloating: no  Cough: no  Appetite: good  Any extra diuretic use: no  Weight gain: no  Home weight: not checking  Home blood pressure: not checking    Past Medical History:   Diagnosis Date    Arthritis     Back problem     Constipation     Diabetes mellitus ([address]     Hypertension     Sleep apnea     has CPAP    Thyroid disease      Past Surgical History:   Procedure Laterality Date  ABDOMEN SURGERY      APPENDECTOMY      CARPAL TUNNEL RELEASE Bilateral     COLONOSCOPY  2017    Dr. Chayo Valverde, ESOPHAGUS     Henrry Pattee      x3 surgeries with 14 repairs    KNEE SURGERY Right 1980's    cartilage    AL EXPLORATORY OF ABDOMEN N/A 2/5/2018    ABDOMINAL EXPLORATION WITH LYSIS OF COMPLICATED ADHESIONS WITH AN EXTENDED RIGHT COLON RESECTION performed by Benito Colmenares MD at [facility] History   Problem Relation Age of Onset    Cancer Mother         breast    Heart Disease Father         bladder, lung    Cancer Father     Stroke Brother     Heart Attack Brother     Prostate Cancer Brother     Diabetes Paternal Grandmother     Arthritis Brother     High Blood Pressure Neg Hx      Social History     Tobacco Use    Smoking status: Never Smoker    Smokeless tobacco: Current User     Types: Chew    Tobacco comment: quit pipe over 30 yrs ago   Substance Use Topics    Alcohol use: No     Alcohol/week: 0.0 standard drinks     Comment: quit     Current Outpatient Medications   Medication Sig Dispense Refill    amitriptyline (ELAVIL) 10 MG tablet Take 1 tablet by mouth nightly 30 tablet 5    spironolactone (ALDACTONE) 50 MG tablet TAKE 1 TABLET BY MOUTH EVERY DAY 30 tablet 8    hydrALAZINE (APRESOLINE) 50 MG tablet Take 1.5 tablets by mouth every 8 hours 270 tablet 3    aspirin EC 81 MG EC tablet Take 1 tablet by mouth daily 90 tablet 1    rOPINIRole (REQUIP) 0.5 MG tablet Take 1 pill in afternoon and 2 pills at night.  90 tablet 2    oxybutynin (DITROPAN XL) 10 MG extended release tablet Take 1 tablet by mouth daily 90 tablet 3    carvedilol (COREG) 25 MG tablet Take 1 tablet by mouth 2 times daily 180 tablet 3    furosemide (LASIX) 40 MG tablet Take 1 tablet by mouth daily 90 tablet 3    glimepiride (AMARYL) 1 MG tablet Take 1 mg by mouth 4 times daily      potassium chloride (KLOR-CON M20) 20 MEQ extended release tablet Take 2 tablets by mouth daily 180 tablet 1    acetaminophen (TYLENOL) 500 MG tablet Take 1 tablet by mouth every 6 hours as needed for Pain 40 tablet 0    oxyCODONE-acetaminophen (PERCOCET)  MG per tablet Take 1 tablet by mouth every 6 hours as needed for Pain.  cloNIDine (CATAPRES) 0.2 MG tablet Take 0.2 mg by mouth 3 times daily      levothyroxine (SYNTHROID) 175 MCG tablet Take 175 mcg by mouth Daily      pantoprazole (PROTONIX) 40 MG tablet Take 1 tablet by mouth every morning (before breakfast) (Patient taking differently: Take 40 mg by mouth as needed ) 30 tablet 0    cyclobenzaprine (FLEXERIL) 10 MG tablet Take 10 mg by mouth 3 times daily as needed for Muscle spasms      albuterol (PROVENTIL HFA;VENTOLIN HFA) 108 (90 BASE) MCG/ACT inhaler Inhale 1 puff into the lungs every 6 hours as needed for Wheezing.  azelastine (ASTELIN) 137 MCG/SPRAY nasal spray 1 spray by Nasal route as needed. Use in each nostril as directed      tamsulosin (FLOMAX) 0.4 MG capsule Take 1 capsule by mouth nightly 90 capsule 3     No current facility-administered medications for this visit. Allergies   Allergen Reactions    Shellfish-Derived Products Swelling    Pcn [Penicillins] Itching    Succinylcholine      Pseudocholinesterase Deficiency    Morphine Nausea And Vomiting     \"head swimming, skin crawling\"    Tape [Adhesive Tape] Rash       SUBJECTIVE:   Review of Systems   Constitutional: Positive for fatigue (improving). Negative for activity change and appetite change. Respiratory: Positive for shortness of breath (improving). Negative for cough. Cardiovascular: Positive for chest pain (improving). Negative for palpitations and leg swelling. Gastrointestinal: Negative for abdominal distention. Neurological: Negative for weakness, light-headedness and headaches. Hematological: Negative for adenopathy. Psychiatric/Behavioral: Negative for sleep disturbance.        OBJECTIVE:   Today's Vitals:  /68   Pulse 84   Ht 6' 1\" (1.854 m)   Wt (!) 368 lb (166.9 kg)   SpO2 95%   BMI 48.55 kg/m²     Physical Exam  Vitals signs reviewed. Constitutional:       General: He is not in acute distress. Appearance: Normal appearance. He is well-developed. He is obese. He is not diaphoretic. HENT:      Head: Normocephalic and atraumatic. Eyes:      Conjunctiva/sclera: Conjunctivae normal.   Neck:      Musculoskeletal: Normal range of motion and neck supple. Comments: No JVD  Cardiovascular:      Rate and Rhythm: Normal rate and regular rhythm. Heart sounds: Normal heart sounds. No murmur. Pulmonary:      Effort: Pulmonary effort is normal. No respiratory distress. Breath sounds: Normal breath sounds. No wheezing or rales. Abdominal:      General: Bowel sounds are normal. There is no distension. Palpations: Abdomen is soft. Tenderness: There is no abdominal tenderness. Musculoskeletal: Normal range of motion. Right lower leg: No edema. Left lower leg: No edema. Skin:     General: Skin is warm and dry. Capillary Refill: Capillary refill takes less than 2 seconds. Neurological:      Mental Status: He is alert and oriented to person, place, and time. Coordination: Coordination normal.   Psychiatric:         Behavior: Behavior normal.       Wt Readings from Last 3 Encounters:   12/08/20 (!) 368 lb (166.9 kg)   12/01/20 (!) 367 lb (166.5 kg)   11/24/20 (!) 367 lb (166.5 kg)     BP Readings from Last 3 Encounters:   12/08/20 138/68   12/01/20 (!) 201/112   11/24/20 (!) 174/100     Pulse Readings from Last 3 Encounters:   12/08/20 84   12/01/20 88   11/24/20 70     Body mass index is 48.55 kg/m². ECHO:    Summary   Technically difficult examination. Left ventricular size is normal and systolic function is mildly reduced. Ejection fraction was estimated at 40-45%. LV wall thickness is within   normal limits.    The right ventricular size appears normal with normal systolic function   and wall thickness. Signature      ----------------------------------------------------------------   Electronically signed by Buck Cain MD (Interpreting   physician) on 04/26/2019 at 05:05 PM   ----------------------------------------------------------------      Findings      Mitral Valve   The mitral valve was not well visualized . DOPPLER: The transmitral velocity was within the normal range with no   evidence for mitral stenosis. There was no evidence of mitral   regurgitation. Aortic Valve   The aortic valve leaflets were not well visualized. DOPPLER: Transaortic velocity was within the normal range with no evidence   of aortic stenosis. There was no evidence of aortic regurgitation. Tricuspid Valve   Tricuspid valve was not well visualized. DOPPLER: There is no evidence of tricuspid stenosis. There was no evidence   of tricuspid regurgitation. Pulmonic Valve   The pulmonic valve was not well visualized . Jayce Hilt DOPPLER: The transpulmonic velocity was within the normal range. No   evidence for regurgitation. Left Atrium   Left atrial size is normal.      Left Ventricle   Left ventricular size is normal and systolic function is mildly reduced. Ejection fraction was estimated at 40-45%. LV wall thickness is within   normal limits. Right Atrium   Right atrial size was normal.      Right Ventricle   The right ventricular size appears normal with normal systolic function   and wall thickness. Pericardial Effusion   The pericardium appears normal with no evidence of a pericardial effusion. Pleural Effusion   No evidence of pleural effusion. Aorta / Great Vessels   -Aortic root dimension within normal limits. -IVC size is within normal limits with normal respiratory phasic changes.    2013 EF 55-60%      CATH/STRESS:   RIGHT HEART CATHETERIZATION:  1.  RA is 25.  2.  RV is 80/23, 27.  3.  PA is 82/44, 59.  4.  Wedge pressure is 35.  5.  LV is 188/28, 36.  6.  AO was 205/126, 160.  7.  AO saturation is 91%. 8.  PA saturation is 63%. 9.  Cardiac output by Cherelle method is 5.9. 10.  Cardiac index is 2.1. CORONARY ANATOMY:  1. Right coronary artery is a dominant vessel. The vessel is quite  large and has mild luminal irregularities in the proximal, mid, and  distal portions of the vessel; otherwise, it is patent. 2.  Left main is patent, gives rise to LAD and left circumflex arteries. 3.  Left circumflex artery is patent without any significant disease. 4. LAD is patent in the proximal portion, mid portion there is a 30% to  40% stenosis followed by mild luminal irregularities in the distal  portion of the LAD. 5.  LV gram was not performed due to renal dysfunction.     The patient tolerated the procedure well without any significant issues  or complications. Hemostasis was achieved with a Vasc Band device.     SUMMARY:  1. Elevated right heart pressures with elevated LVEPD. 2.  Patent coronaries.     RECOMMENDATIONS:  1.  IV diuresis. (Lasix 80 IVP x 1)  2. Monitor electrolytes. 3.  Optimize heart failure therapy. 4.  Weight loss advised. 5.  Optimize sleep apnea therapies.   6.  Follow up in clinic in one to two weeks to evaluate symptoms  further.     Nash Morales MD     D: 12/06/2020 21:57:38       T: 12/06/2020 23:06:51     [email or web address]  Job#: [id number]     Doc#: 22      Results reviewed:  BNP: No results found for: BNP  CBC:   Lab Results   Component Value Date    WBC 8.8 12/01/2020    RBC 5.29 12/01/2020    RBC 5.15 08/12/2011    HGB 15.4 12/01/2020    HCT 45.7 12/01/2020     12/01/2020     CMP:    Lab Results   Component Value Date     12/01/2020    K 3.8 12/01/2020    K 2.9 12/01/2020    CL 97 12/01/2020    CO2 29 12/01/2020    BUN 9 12/01/2020    CREATININE 1.1 12/01/2020    LABGLOM 69 12/01/2020    GLUCOSE 284 12/01/2020    CALCIUM 8.3 12/01/2020     Hepatic Function Panel:    Lab Results   Component Value Date    ALKPHOS 91 sheet reviewed, patient voices understanding. Questions answered. · Daily weights  · Fluid restriction of 2 Liters per day  · Limit sodium in diet to around 6972-8013 mg/day  · Monitor BP  · Activity as tolerated     Patient was instructed to call the [facility] for any changes in symptoms as noted in AVS.      Return in about 6 weeks (around 1/19/2021). or sooner if needed     Patient given educational materials - see patient instructions. We discussed the importance of weighing oneself and recording daily. We also discussed the importance of a low sodium diet, higher sodium foods to avoid and better low sodium food options. Patient verbalizes understanding of plan of care using teach back method, and is agreeable to the treatment plan.        Electronically signed by SAHIL Montes CNP on 12/8/2020 at 2:26 PM

## 2020-12-15 ENCOUNTER — HOSPITAL ENCOUNTER (OUTPATIENT)
Dept: NON INVASIVE DIAGNOSTICS | Age: 60
Discharge: HOME | End: 2020-12-15
Payer: MEDICARE

## 2020-12-15 LAB
LV EF: 58 %
LVEF MODALITY: NORMAL

## 2020-12-15 PROCEDURE — 93306 TTE W/DOPPLER COMPLETE: CPT

## 2021-01-07 ENCOUNTER — TELEPHONE (OUTPATIENT)
Dept: CARDIOLOGY CLINIC | Age: 61
End: 2021-01-07

## 2021-01-07 NOTE — TELEPHONE ENCOUNTER
Patient was to have appt today 1/7 but cancelled. We still need lab work done that is over due. He will get it done once he is fever free.

## 2021-01-13 ENCOUNTER — HOSPITAL ENCOUNTER (OUTPATIENT)
Age: 61
Discharge: HOME | End: 2021-01-13
Payer: MEDICARE

## 2021-01-13 DIAGNOSIS — G47.33 OBSTRUCTIVE SLEEP APNEA: Primary | ICD-10-CM

## 2021-01-13 DIAGNOSIS — I50.22 CHF (CONGESTIVE HEART FAILURE), NYHA CLASS II, CHRONIC, SYSTOLIC (HCC): ICD-10-CM

## 2021-01-13 PROCEDURE — 80048 BASIC METABOLIC PNL TOTAL CA: CPT

## 2021-01-13 PROCEDURE — 83735 ASSAY OF MAGNESIUM: CPT

## 2021-01-13 PROCEDURE — 36415 COLL VENOUS BLD VENIPUNCTURE: CPT

## 2021-01-14 ENCOUNTER — TELEPHONE (OUTPATIENT)
Dept: CARDIOLOGY CLINIC | Age: 61
End: 2021-01-14

## 2021-01-14 LAB
ANION GAP SERPL CALCULATED.3IONS-SCNC: 10 MEQ/L (ref 8–16)
BUN BLDV-MCNC: 8 MG/DL (ref 7–22)
CALCIUM SERPL-MCNC: 9.3 MG/DL (ref 8.5–10.5)
CHLORIDE BLD-SCNC: 95 MEQ/L (ref 98–111)
CO2: 33 MEQ/L (ref 23–33)
CREAT SERPL-MCNC: 1 MG/DL (ref 0.4–1.2)
GFR SERPL CREATININE-BSD FRML MDRD: 77 ML/MIN/1.73M2
GLUCOSE BLD-MCNC: 396 MG/DL (ref 70–108)
MAGNESIUM: 2 MG/DL (ref 1.6–2.4)
POTASSIUM SERPL-SCNC: 3.1 MEQ/L (ref 3.5–5.2)
SODIUM BLD-SCNC: 138 MEQ/L (ref 135–145)

## 2021-01-14 NOTE — TELEPHONE ENCOUNTER
Labs reviewed - K+ 3.1   Verify meds - on 12/8 he was on Lasix 40 AM, Aldactone 50 AM and Kcl 40/day - I added Lasix 20 PM and Aldactone 25 PM   If accurate he probably needs new RX sent   Take extra Kcl 40 today and tomorrow then add 20 meq in PM indefinitely.    Has f/u 1/26

## 2021-01-18 ENCOUNTER — OFFICE VISIT (OUTPATIENT)
Dept: PHYSICAL MEDICINE AND REHAB | Age: 61
End: 2021-01-18
Payer: MEDICARE

## 2021-01-18 VITALS
HEIGHT: 73 IN | SYSTOLIC BLOOD PRESSURE: 136 MMHG | BODY MASS INDEX: 41.75 KG/M2 | WEIGHT: 315 LBS | DIASTOLIC BLOOD PRESSURE: 80 MMHG

## 2021-01-18 DIAGNOSIS — M54.17 LUMBOSACRAL RADICULITIS: Primary | ICD-10-CM

## 2021-01-18 DIAGNOSIS — G89.4 CHRONIC PAIN SYNDROME: ICD-10-CM

## 2021-01-18 DIAGNOSIS — M54.2 NECK PAIN: ICD-10-CM

## 2021-01-18 DIAGNOSIS — M47.816 SPONDYLOSIS OF LUMBAR REGION WITHOUT MYELOPATHY OR RADICULOPATHY: ICD-10-CM

## 2021-01-18 DIAGNOSIS — M47.816 LUMBAR FACET ARTHROPATHY: ICD-10-CM

## 2021-01-18 DIAGNOSIS — G62.9 NEUROPATHY: ICD-10-CM

## 2021-01-18 DIAGNOSIS — M48.062 SPINAL STENOSIS OF LUMBAR REGION WITH NEUROGENIC CLAUDICATION: ICD-10-CM

## 2021-01-18 PROCEDURE — G8484 FLU IMMUNIZE NO ADMIN: HCPCS | Performed by: NURSE PRACTITIONER

## 2021-01-18 PROCEDURE — 3017F COLORECTAL CA SCREEN DOC REV: CPT | Performed by: NURSE PRACTITIONER

## 2021-01-18 PROCEDURE — G8417 CALC BMI ABV UP PARAM F/U: HCPCS | Performed by: NURSE PRACTITIONER

## 2021-01-18 PROCEDURE — 99213 OFFICE O/P EST LOW 20 MIN: CPT | Performed by: NURSE PRACTITIONER

## 2021-01-18 PROCEDURE — G8427 DOCREV CUR MEDS BY ELIG CLIN: HCPCS | Performed by: NURSE PRACTITIONER

## 2021-01-18 PROCEDURE — 4004F PT TOBACCO SCREEN RCVD TLK: CPT | Performed by: NURSE PRACTITIONER

## 2021-01-18 RX ORDER — OXYCODONE AND ACETAMINOPHEN 10; 325 MG/1; MG/1
1 TABLET ORAL EVERY 8 HOURS PRN
Qty: 42 TABLET | Refills: 0 | Status: SHIPPED | OUTPATIENT
Start: 2021-01-18 | End: 2021-01-27 | Stop reason: SDUPTHER

## 2021-01-18 ASSESSMENT — ENCOUNTER SYMPTOMS
SHORTNESS OF BREATH: 1
BACK PAIN: 1
COUGH: 1

## 2021-01-18 NOTE — PROGRESS NOTES
[facility] [address]  Dept: [phone]  Dept Fax: [phone]: [phone]    Visit Date: 1/18/2021    Functionality Assessment/Goals Worksheet     On a scale of 0 (Does not Interfere) to 10 (Completely Interferes)     1. Which number describes how during the past week pain has interfered with       the following:  A. General Activity:  6  B. Mood: 0  C. Walking Ability:  0  D. Normal Work (Includes both work outside the home and housework):  0  E. Relations with Other People:   0  F. Sleep:   0  G. Enjoyment of Life:   0    2. Patient Prefers to Take their Pain Medications:     []  On a regular basis   []  Only when necessary    []  Does not take pain medications    3. What are the Patient's Goals/Expectations for Visiting Pain Management? []  Learn about my pain    []  Receive Medication   []  Physical Therapy     []  Treat Depression   []  Receive Injections    []  Treat Sleep   []  Deal with Anxiety and Stress   []  Treat Opoid Dependence/Addiction   []  Other:      HPI:   Marietta Suarez is a 64 y.o. male is here today for    Chief Complaint: Low back, leg pain     HPI   1.5 month FU. Main complaint remains pain in lower back and radiates across lower back and down into buttocks/ SI area and down right lower extremity posteriorly to feet. Pain is constant and is described as a \"tooth ache pain in back and buttocks\" in right leg is burning stabbing and numbness. Pain is more bothersome across lower back compared to leg 70% bothersome compared to 30% bothersome in leg. States that he had a fall 3 weeks ago while taking his dogs out and slipped and states that his lower back pain and hips have been slightly increased.      Neck pain is greatly improved from physical therapy- he did not get neck xray or shoulder xray that was ordered last visit,     Just took the last Percocet last Friday from pcp and patient wants us to take over. Medications reviewed. Patient denies side effects with medications. Patient states he is taking medications as prescribed. Hedenies receiving pain medications from other sources. He denies any ER visits since last visit. Pain scale with out pain medications or at its worst is 7-8/10. Pain scale with pain medications or at its best is 4/10. Last dose of Percocet was Friday night and Neurontin was yesterday  Drug screen reviewed from 11/24/2020 and was appropriate  Pill count was not completed today  Patient does not have naloxone available at home. Patient has not required use of naloxone at home since last office visit. The patientis allergic to shellfish-derived products; pcn [penicillins]; succinylcholine; morphine; and tape [adhesive tape]. Past Medical History  Ryann Hargrove  has a past medical history of Arthritis, Back problem, Constipation, Diabetes mellitus ([address] Hypertension, Sleep apnea, and Thyroid disease. Past Surgical History  The patient  has a past surgical history that includes Appendectomy; knee surgery (Right, 1980's); Carpal tunnel release (Bilateral); Colonoscopy (2017); hernia repair; pr exploratory of abdomen (N/A, 2/5/2018); Dilatation, esophagus; and Abdomen surgery. Family History  This patient's family history includes Arthritis in his brother; Cancer in his father and mother; Diabetes in his paternal grandmother; Heart Attack in his brother; Heart Disease in his father; Prostate Cancer in his brother; Stroke in his brother. Social History  Ryann Hargrove  reports that he has never smoked. His smokeless tobacco use includes chew. He reports that he does not drink alcohol or use drugs. Medications    Current Outpatient Medications:     oxyCODONE-acetaminophen (PERCOCET)  MG per tablet, Take 1 tablet by mouth every 8 hours as needed for Pain for up to 14 days. , Disp: 42 tablet, Rfl: 0   amitriptyline (ELAVIL) 10 MG tablet, Take 1 tablet by mouth nightly, Disp: 30 tablet, Rfl: 5    spironolactone (ALDACTONE) 50 MG tablet, TAKE 1 TABLET BY MOUTH EVERY DAY, Disp: 30 tablet, Rfl: 8    hydrALAZINE (APRESOLINE) 50 MG tablet, Take 1.5 tablets by mouth every 8 hours, Disp: 270 tablet, Rfl: 3    aspirin EC 81 MG EC tablet, Take 1 tablet by mouth daily, Disp: 90 tablet, Rfl: 1    rOPINIRole (REQUIP) 0.5 MG tablet, Take 1 pill in afternoon and 2 pills at night., Disp: 90 tablet, Rfl: 2    oxybutynin (DITROPAN XL) 10 MG extended release tablet, Take 1 tablet by mouth daily, Disp: 90 tablet, Rfl: 3    carvedilol (COREG) 25 MG tablet, Take 1 tablet by mouth 2 times daily, Disp: 180 tablet, Rfl: 3    furosemide (LASIX) 40 MG tablet, Take 1 tablet by mouth daily, Disp: 90 tablet, Rfl: 3    glimepiride (AMARYL) 1 MG tablet, Take 1 mg by mouth 4 times daily, Disp: , Rfl:     potassium chloride (KLOR-CON M20) 20 MEQ extended release tablet, Take 2 tablets by mouth daily, Disp: 180 tablet, Rfl: 1    acetaminophen (TYLENOL) 500 MG tablet, Take 1 tablet by mouth every 6 hours as needed for Pain, Disp: 40 tablet, Rfl: 0    oxyCODONE-acetaminophen (PERCOCET)  MG per tablet, Take 1 tablet by mouth every 6 hours as needed for Pain., Disp: , Rfl:     cloNIDine (CATAPRES) 0.2 MG tablet, Take 0.2 mg by mouth 3 times daily, Disp: , Rfl:     levothyroxine (SYNTHROID) 175 MCG tablet, Take 175 mcg by mouth Daily, Disp: , Rfl:     pantoprazole (PROTONIX) 40 MG tablet, Take 1 tablet by mouth every morning (before breakfast) (Patient taking differently: Take 40 mg by mouth as needed ), Disp: 30 tablet, Rfl: 0    cyclobenzaprine (FLEXERIL) 10 MG tablet, Take 10 mg by mouth 3 times daily as needed for Muscle spasms, Disp: , Rfl:     albuterol (PROVENTIL HFA;VENTOLIN HFA) 108 (90 BASE) MCG/ACT inhaler, Inhale 1 puff into the lungs every 6 hours as needed for Wheezing., Disp: , Rfl:     azelastine (ASTELIN) 137 MCG/SPRAY nasal spray, 1 spray by Nasal route as needed. Use in each nostril as directed, Disp: , Rfl:     tamsulosin (FLOMAX) 0.4 MG capsule, Take 1 capsule by mouth nightly, Disp: 90 capsule, Rfl: 3    Subjective:      Review of Systems   Constitutional: Negative. Respiratory: Positive for cough and shortness of breath. Cardiovascular: Positive for leg swelling. Musculoskeletal: Positive for arthralgias, back pain, gait problem, myalgias and neck stiffness. Negative for neck pain. Neurological: Positive for weakness and numbness. Objective:     Vitals:    01/18/21 0851   BP: 136/80   Weight: (!) 368 lb (166.9 kg)   Height: 6' 1\" (1.854 m)       Physical Exam  Constitutional:       General: He is not in acute distress. Appearance: Normal appearance. He is obese. He is not ill-appearing, toxic-appearing or diaphoretic. HENT:      Head: Normocephalic and atraumatic. Right Ear: External ear normal. There is no impacted cerumen. Left Ear: External ear normal. There is no impacted cerumen. Nose: Nose normal. No congestion or rhinorrhea. Mouth/Throat:      Mouth: Mucous membranes are moist.      Pharynx: Oropharynx is clear. Eyes:      Extraocular Movements: Extraocular movements intact. Conjunctiva/sclera: Conjunctivae normal.      Pupils: Pupils are equal, round, and reactive to light. Neck:      Musculoskeletal: Neck supple. Muscular tenderness present. Cardiovascular:      Rate and Rhythm: Normal rate and regular rhythm. Pulses: Normal pulses. Heart sounds: Murmur present. Pulmonary:      Effort: Pulmonary effort is normal. No respiratory distress. Breath sounds: Normal breath sounds. Abdominal:      General: Abdomen is flat. Bowel sounds are normal. There is no distension. Palpations: Abdomen is soft. There is no mass. Tenderness: There is no abdominal tenderness. Hernia: No hernia is present.    Musculoskeletal:         General: Tenderness present. Right shoulder: He exhibits decreased range of motion, tenderness and pain. Right wrist: He exhibits decreased range of motion and tenderness. Left wrist: He exhibits decreased range of motion and tenderness. Right hip: He exhibits decreased range of motion and decreased strength. Left hip: He exhibits decreased range of motion and decreased strength. Right ankle: He exhibits decreased range of motion and swelling. Tenderness. Achilles tendon exhibits pain. Left ankle: Tenderness. Achilles tendon exhibits pain. Cervical back: He exhibits decreased range of motion, tenderness and bony tenderness. Thoracic back: He exhibits decreased range of motion and bony tenderness. Lumbar back: He exhibits decreased range of motion, tenderness, bony tenderness and pain. Back:       Right lower leg: Edema present. Left lower leg: Edema present. Skin:     General: Skin is warm and dry. Capillary Refill: Capillary refill takes less than 2 seconds. Neurological:      General: No focal deficit present. Mental Status: He is alert and oriented to person, place, and time. Sensory: Sensory deficit present. Motor: Weakness present. Coordination: Romberg sign positive. Coordination abnormal.      Gait: Gait abnormal.      Deep Tendon Reflexes:      Reflex Scores:       Tricep reflexes are 2+ on the right side and 2+ on the left side. Bicep reflexes are 2+ on the right side and 2+ on the left side. Brachioradialis reflexes are 2+ on the right side and 2+ on the left side. Patellar reflexes are 1+ on the right side and 1+ on the left side. Achilles reflexes are 1+ on the right side and 1+ on the left side.      Comments: 4/5/strength bilateral lower extremities    + bilateral SLR at 40 degrees    Decraesed sensation bilateral feet    Psychiatric:         Mood and Affect: Mood normal.         Behavior: Behavior normal.       SAUNDRA test: + bilaterally   Yeomans test: + bilaterally   Gaenslen test: + bilaterally      Assessment:     1. Lumbosacral radiculitis    2. Spinal stenosis of lumbar region with neurogenic claudication    3. Spondylosis of lumbar region without myelopathy or radiculopathy    4. Lumbar facet arthropathy    5. Neuropathy    6. Neck pain    7. Chronic pain syndrome            Plan:      · OARRS reviewed. Current MED: 45.00  · Patient was offered naloxone for home. · Discussed long term side effects of medications, tolerance, dependency and addiction. · Previous UDS reviewed  · UDS preformed today for compliance. · Patient told can not receive any pain medications from any other source. · No evidence of abuse, diversion or aberrant behavior.  Medications and/or procedures to improve function and quality of life- patient understanding with this and that may not be pain free   Discussed with patient about safe storage of medications at home   Discussed possible weaning of medication dosing dependent on treatment/procedure results.  Discussed with patient about risks with procedure including infection, reaction to medication, increased pain, or bleeding. · Reviewed L-Xray and L-MRI in detail  · Discussed L-facet MBB, LESI possibly in future. Patient wants to hold off at this time d/t heart issues following in CHF clinic and because of Virus   ·  UDS was appropriate will take of Percocet prescription from PCP [place] and will prescribe percocet 10/325 TID prn- ordered2 week refill and another updated UDS. · Continue Neurontin 300 mg at bedtime filled 1/12/2021 from pcp   · Instructed to finish PT and continue home exercises after       Meds. Prescribed:   Orders Placed This Encounter   Medications    oxyCODONE-acetaminophen (PERCOCET)  MG per tablet     Sig: Take 1 tablet by mouth every 8 hours as needed for Pain for up to 14 days.      Dispense:  42 tablet     Refill:  0     Reduce doses taken as pain becomes manageable       Return in about 2 months (around 3/18/2021), or if symptoms worsen or fail to improve, for follow up  for medications. Time spent with patient was 15 minutes, more than 50% was spent Counseling/coordinated the patient's care.       Electronically signed by Gevena Sandifer, APRN - CNP on1/18/2021 at 9:08 AM

## 2021-01-26 ENCOUNTER — TELEPHONE (OUTPATIENT)
Dept: CARDIOLOGY CLINIC | Age: 61
End: 2021-01-26

## 2021-01-26 DIAGNOSIS — I50.22 CHF (CONGESTIVE HEART FAILURE), NYHA CLASS II, CHRONIC, SYSTOLIC (HCC): Primary | ICD-10-CM

## 2021-01-27 ENCOUNTER — HOSPITAL ENCOUNTER (OUTPATIENT)
Age: 61
Discharge: HOME | End: 2021-01-27
Payer: MEDICARE

## 2021-01-27 DIAGNOSIS — G89.4 CHRONIC PAIN SYNDROME: ICD-10-CM

## 2021-01-27 DIAGNOSIS — I50.22 CHF (CONGESTIVE HEART FAILURE), NYHA CLASS II, CHRONIC, SYSTOLIC (HCC): ICD-10-CM

## 2021-01-27 PROCEDURE — 80048 BASIC METABOLIC PNL TOTAL CA: CPT

## 2021-01-27 PROCEDURE — 36415 COLL VENOUS BLD VENIPUNCTURE: CPT

## 2021-01-27 RX ORDER — OXYCODONE AND ACETAMINOPHEN 10; 325 MG/1; MG/1
1 TABLET ORAL EVERY 8 HOURS PRN
Qty: 90 TABLET | Refills: 0 | Status: SHIPPED | OUTPATIENT
Start: 2021-02-01 | End: 2021-02-03 | Stop reason: SDUPTHER

## 2021-01-27 NOTE — TELEPHONE ENCOUNTER
OARRS reviewed. UDS: + for  Gabapentin flexeril consistent. Last seen: 1/18/2021.  Follow-up:   Future Appointments   Date Time Provider Ismael Bhandarii   2/11/2021 10:30 AM SAHIL Mckeon CNP N SRPX CHF [facility] [place]   2/19/2021  8:15 PM SCHEDULE, [facility] SLEEP TECH 01 [facility] 1 [facility]   3/11/2021  3:15 PM Gricel Tian PA-C [facility] [address]   3/22/2021  9:00 AM SAHIL Phillip CNP SRPX Pain [facility] [place]   4/13/2021  9:15 AM [facility] [place]   5/24/2021  9:45 AM MD RANGEL Weber SRPX Heart [facility] [place]   7/8/2021  1:30 PM MD RANGEL Weber SRPX Heart [facility] [place]   7/23/2021 11:40 AM MD RANGEL Aguilar [facility]. [facility] [place]

## 2021-01-28 ENCOUNTER — TELEPHONE (OUTPATIENT)
Dept: CARDIOLOGY CLINIC | Age: 61
End: 2021-01-28

## 2021-01-28 LAB
ANION GAP SERPL CALCULATED.3IONS-SCNC: 9 MEQ/L (ref 8–16)
BUN BLDV-MCNC: 9 MG/DL (ref 7–22)
CALCIUM SERPL-MCNC: 9.1 MG/DL (ref 8.5–10.5)
CHLORIDE BLD-SCNC: 97 MEQ/L (ref 98–111)
CO2: 31 MEQ/L (ref 23–33)
CREAT SERPL-MCNC: 1.1 MG/DL (ref 0.4–1.2)
GFR SERPL CREATININE-BSD FRML MDRD: 69 ML/MIN/1.73M2
GLUCOSE BLD-MCNC: 286 MG/DL (ref 70–108)
POTASSIUM SERPL-SCNC: 4.1 MEQ/L (ref 3.5–5.2)
SODIUM BLD-SCNC: 137 MEQ/L (ref 135–145)

## 2021-01-28 NOTE — TELEPHONE ENCOUNTER
----- Message from Reyes Conception, APRN - CNP sent at 1/28/2021  7:53 AM EST -----  Labs look good. Potassium much better.

## 2021-02-02 ENCOUNTER — TELEPHONE (OUTPATIENT)
Dept: SLEEP CENTER | Age: 61
End: 2021-02-02

## 2021-02-02 DIAGNOSIS — G47.33 OBSTRUCTIVE SLEEP APNEA: Primary | ICD-10-CM

## 2021-02-02 NOTE — TELEPHONE ENCOUNTER
Per [facility], they will only approve a Split Night study for Meliton Arron. Please advise. Thank you.     Alma Olivia

## 2021-02-03 ENCOUNTER — PATIENT MESSAGE (OUTPATIENT)
Dept: CARDIOLOGY CLINIC | Age: 61
End: 2021-02-03

## 2021-02-03 DIAGNOSIS — G89.4 CHRONIC PAIN SYNDROME: ICD-10-CM

## 2021-02-03 RX ORDER — OXYCODONE AND ACETAMINOPHEN 10; 325 MG/1; MG/1
1 TABLET ORAL EVERY 8 HOURS PRN
Qty: 90 TABLET | Refills: 0 | Status: CANCELLED | OUTPATIENT
Start: 2021-02-03 | End: 2021-03-05

## 2021-02-03 RX ORDER — OXYCODONE AND ACETAMINOPHEN 10; 325 MG/1; MG/1
1 TABLET ORAL EVERY 8 HOURS PRN
Qty: 90 TABLET | Refills: 0 | Status: SHIPPED | OUTPATIENT
Start: 2021-02-03 | End: 2021-03-04 | Stop reason: SDUPTHER

## 2021-02-03 NOTE — TELEPHONE ENCOUNTER
Clayton Mast called requesting a refill on the following medications:  Requested Prescriptions     Pending Prescriptions Disp Refills    oxyCODONE-acetaminophen (PERCOCET)  MG per tablet 90 tablet 0     Sig: Take 1 tablet by mouth every 8 hours as needed for Pain for up to 30 days.      Pharmacy verified:  .pv  [facility] in [place]    Date of last visit: 01/18/2021  Date of next visit (if applicable): 1/32/3320

## 2021-02-03 NOTE — TELEPHONE ENCOUNTER
OARRS reviewed. UDS: + for  Oxycodone -consistent. + for  Zolpidem, Cyclobenzaprine, Gabapentin -inconsistent.     Last seen: 1/18/2021    Follow-up: 3/22/2021

## 2021-02-04 RX ORDER — FUROSEMIDE 40 MG/1
TABLET ORAL
Qty: 90 TABLET | Refills: 3 | Status: ON HOLD
Start: 2021-02-04 | End: 2021-03-03 | Stop reason: HOSPADM

## 2021-02-04 RX ORDER — SPIRONOLACTONE 50 MG/1
TABLET, FILM COATED ORAL
Qty: 30 TABLET | Refills: 8
Start: 2021-02-04 | End: 2021-03-15

## 2021-02-04 NOTE — TELEPHONE ENCOUNTER
Called and spoke to pt = more SOB/AKINS, wt up few #. Instructed to increase Lasix to 40 BID and Aldactone 50 BID over weekend - call w/ update Mon/Tues.

## 2021-02-04 NOTE — TELEPHONE ENCOUNTER
From: Bere Myles  To: SAHIL Ford - ROCIO  Sent: 2/3/2021 5:14 PM EST  Subject: Non-Urgent Medical Question    Hi I feel like I am getting a build up of fluid around my heart again due to very short walk and I am findiny it hard to breathe and very short breath also I have no energy I just wanted to keep you posted on what's going on thanks [employer]

## 2021-02-11 ENCOUNTER — OFFICE VISIT (OUTPATIENT)
Dept: CARDIOLOGY CLINIC | Age: 61
End: 2021-02-11
Payer: MEDICARE

## 2021-02-11 VITALS
BODY MASS INDEX: 41.75 KG/M2 | OXYGEN SATURATION: 98 % | SYSTOLIC BLOOD PRESSURE: 150 MMHG | HEART RATE: 90 BPM | WEIGHT: 315 LBS | HEIGHT: 73 IN | DIASTOLIC BLOOD PRESSURE: 60 MMHG

## 2021-02-11 DIAGNOSIS — I10 ESSENTIAL HYPERTENSION: ICD-10-CM

## 2021-02-11 DIAGNOSIS — E66.01 OBESITY, CLASS III, BMI 40-49.9 (MORBID OBESITY) (HCC): ICD-10-CM

## 2021-02-11 DIAGNOSIS — I50.32 CHF (CONGESTIVE HEART FAILURE), NYHA CLASS II, CHRONIC, DIASTOLIC (HCC): Primary | ICD-10-CM

## 2021-02-11 DIAGNOSIS — R06.09 DOE (DYSPNEA ON EXERTION): ICD-10-CM

## 2021-02-11 DIAGNOSIS — G47.33 OSA (OBSTRUCTIVE SLEEP APNEA): ICD-10-CM

## 2021-02-11 PROCEDURE — 99214 OFFICE O/P EST MOD 30 MIN: CPT | Performed by: NURSE PRACTITIONER

## 2021-02-11 PROCEDURE — G8417 CALC BMI ABV UP PARAM F/U: HCPCS | Performed by: NURSE PRACTITIONER

## 2021-02-11 PROCEDURE — 4004F PT TOBACCO SCREEN RCVD TLK: CPT | Performed by: NURSE PRACTITIONER

## 2021-02-11 PROCEDURE — 3017F COLORECTAL CA SCREEN DOC REV: CPT | Performed by: NURSE PRACTITIONER

## 2021-02-11 PROCEDURE — G8482 FLU IMMUNIZE ORDER/ADMIN: HCPCS | Performed by: NURSE PRACTITIONER

## 2021-02-11 PROCEDURE — G8427 DOCREV CUR MEDS BY ELIG CLIN: HCPCS | Performed by: NURSE PRACTITIONER

## 2021-02-11 RX ORDER — GLIMEPIRIDE 4 MG/1
4 TABLET ORAL DAILY
COMMUNITY
Start: 2021-01-16 | End: 2022-08-17

## 2021-02-11 RX ORDER — LOSARTAN POTASSIUM 25 MG/1
25 TABLET ORAL DAILY
Qty: 30 TABLET | Refills: 5 | Status: ON HOLD | OUTPATIENT
Start: 2021-02-11 | End: 2021-05-27 | Stop reason: HOSPADM

## 2021-02-11 RX ORDER — AMLODIPINE BESYLATE 10 MG/1
10 TABLET ORAL DAILY
Status: ON HOLD | COMMUNITY
Start: 2021-01-16 | End: 2021-05-27 | Stop reason: HOSPADM

## 2021-02-11 RX ORDER — GABAPENTIN 300 MG/1
300 CAPSULE ORAL NIGHTLY
COMMUNITY
Start: 2021-01-12 | End: 2021-03-22 | Stop reason: SDUPTHER

## 2021-02-11 RX ORDER — ZOLPIDEM TARTRATE 12.5 MG/1
12.5 TABLET, FILM COATED, EXTENDED RELEASE ORAL NIGHTLY PRN
COMMUNITY
Start: 2021-01-12 | End: 2021-05-12

## 2021-02-11 RX ORDER — POTASSIUM CHLORIDE 750 MG/1
3 CAPSULE, EXTENDED RELEASE ORAL DAILY
Status: ON HOLD | COMMUNITY
Start: 2021-01-16 | End: 2021-03-03 | Stop reason: SDUPTHER

## 2021-02-11 ASSESSMENT — ENCOUNTER SYMPTOMS
ABDOMINAL DISTENTION: 0
COUGH: 0
SHORTNESS OF BREATH: 1

## 2021-02-11 NOTE — PATIENT INSTRUCTIONS
You may receive a survey regarding the care you received during your visit. Your input is valuable to us. We encourage you to complete and return your survey. We hope you will choose us in the future for your healthcare needs.     Continue:  · Continue current medications  · Daily weights and record  · Fluid restriction of 2 Liters per day  · Limit sodium in diet to around 9381-9262 mg/day  · Monitor BP  · Activity as tolerated     Call the Heart Failure Clinic for any of the following symptoms: [phone]   Weight gain of 2-3 pounds in 1 day or 5 pounds in 1 week   Increased shortness of breath   Shortness of breath while laying down   Cough   Chest pain   Swelling in feet, ankles or legs   Tenderness or bloating in the abdomen   Fatigue    Decreased appetite or nausea    Confusion   

## 2021-02-11 NOTE — PROGRESS NOTES
Heart Failure Clinic       Visit Date: 2/11/2021  Cardiologist:  Dr. Kathleen Floyd  Primary Care Physician: Dr. Yessi Khalil,     Tresa Birmingham is a 64 y.o. male who presents today for:  Chief Complaint   Patient presents with    Congestive Heart Failure       HPI:   Tresa Birmingham is a 64 y.o. male who presents to the office for a follow up patient visit in the heart failure clinic. Accompanied by no one  Orginally from [place] -  - raising 2 grandkids, 8 & 10  On disability since bowel surgery -  at [address] [place] [address] HF: HFrecoveredEF (40-45% - improved to 55-60% 12/2020)  Device: no  HX: HTN, DM, LAURIE (CPAP), CKD (Tanda Pollo), Partial colectomy (2017), never smoker (wife does)     Dry Wt:  ???     Hospitalization:    OV [facility] 11/19 - BP elevated, +CP, SOB  12/1 = LHC w/ [facility] = nonobstructive disease, elevated LVEDP - given Lasix 80 IV x 1  Low K+ on admission - 2.9 = issues w/ Hypokalemia    Pt called on 2/4 c/o SOB/AKINS - increased Lasix 40 BID, Aldactone 50 BID x 3 days  Today: Down 13# since OV in Dec. Feeling better past few days. Didn't notice much improvement w/ extra diuretic last week. States breathing seemed to improve most w/ Albuterol inhaler use. Never had PFTs - nonsmoker but secondhand smoke and hx exposure (worked as  at [employer])  Activity: walked in from [place] [id number] like train, but I didn't stop\". Diet: watching salt and fluid intake.     Patient has:  Chest Pain: no  SOB: AKINS  Orthopnea/PND: no  LAURIE: compliant w/ CPAP   Edema: slight  Fatigue: no  Abdominal bloating: no  Cough: no  Appetite: good  Any extra diuretic use: no  Weight gain: no  Home weight: stable  Home blood pressure: running higher = 150-160/80-90    Past Medical History:   Diagnosis Date    Arthritis     Back problem     Constipation     Diabetes mellitus ([address]     Hypertension     Sleep apnea     has CPAP    Thyroid disease      Past Surgical History:   Procedure Laterality Date    ABDOMEN SURGERY      APPENDECTOMY      CARPAL TUNNEL RELEASE Bilateral     COLONOSCOPY  2017    Dr. Sheela Milian, ESOPHAGUS     [address]      x3 surgeries with 14 repairs    KNEE SURGERY Right 1980's    cartilage    IA EXPLORATORY OF ABDOMEN N/A 2/5/2018    ABDOMINAL EXPLORATION WITH LYSIS OF COMPLICATED ADHESIONS WITH AN EXTENDED RIGHT COLON RESECTION performed by Caroline Islas MD at [facility] History   Problem Relation Age of Onset    Cancer Mother         breast    Heart Disease Father         bladder, lung    Cancer Father     Stroke Brother     Heart Attack Brother     Prostate Cancer Brother     Diabetes Paternal Grandmother     Arthritis Brother     High Blood Pressure Neg Hx      Social History     Tobacco Use    Smoking status: Never Smoker    Smokeless tobacco: Current User     Types: Chew    Tobacco comment: quit pipe over 30 yrs ago   Substance Use Topics    Alcohol use: No     Alcohol/week: 0.0 standard drinks     Comment: quit     Current Outpatient Medications   Medication Sig Dispense Refill    zolpidem (AMBIEN CR) 12.5 MG extended release tablet Take 12.5 mg by mouth nightly as needed.  gabapentin (NEURONTIN) 300 MG capsule Take 300 mg by mouth nightly.  amLODIPine (NORVASC) 10 MG tablet Take 10 mg by mouth daily      bismuth subsalicylate (PEPTO BISMOL) 262 MG/15ML suspension Take by mouth daily      glimepiride (AMARYL) 4 MG tablet Take 4 mg by mouth daily      potassium chloride (MICRO-K) 10 MEQ extended release capsule Take 3 capsules by mouth daily      losartan (COZAAR) 25 MG tablet Take 1 tablet by mouth daily 30 tablet 5    spironolactone (ALDACTONE) 50 MG tablet 50 mg AM, 25 mg PM 30 tablet 8    furosemide (LASIX) 40 MG tablet 40 mg AM, 20 mg PM 90 tablet 3    oxyCODONE-acetaminophen (PERCOCET)  MG per tablet Take 1 tablet by mouth every 8 hours as needed for Pain for up to 30 days.  90 tablet 0    amitriptyline (ELAVIL) 10 MG tablet Take 1 tablet by mouth nightly 30 tablet 5    hydrALAZINE (APRESOLINE) 50 MG tablet Take 1.5 tablets by mouth every 8 hours 270 tablet 3    aspirin EC 81 MG EC tablet Take 1 tablet by mouth daily 90 tablet 1    rOPINIRole (REQUIP) 0.5 MG tablet Take 1 pill in afternoon and 2 pills at night. 90 tablet 2    oxybutynin (DITROPAN XL) 10 MG extended release tablet Take 1 tablet by mouth daily 90 tablet 3    carvedilol (COREG) 25 MG tablet Take 1 tablet by mouth 2 times daily 180 tablet 3    tamsulosin (FLOMAX) 0.4 MG capsule Take 1 capsule by mouth nightly 90 capsule 3    acetaminophen (TYLENOL) 500 MG tablet Take 1 tablet by mouth every 6 hours as needed for Pain 40 tablet 0    cloNIDine (CATAPRES) 0.2 MG tablet Take 0.2 mg by mouth 3 times daily      levothyroxine (SYNTHROID) 175 MCG tablet Take 175 mcg by mouth Daily      pantoprazole (PROTONIX) 40 MG tablet Take 1 tablet by mouth every morning (before breakfast) (Patient taking differently: Take 40 mg by mouth as needed ) 30 tablet 0    cyclobenzaprine (FLEXERIL) 10 MG tablet Take 10 mg by mouth 3 times daily as needed for Muscle spasms      albuterol (PROVENTIL HFA;VENTOLIN HFA) 108 (90 BASE) MCG/ACT inhaler Inhale 1 puff into the lungs every 6 hours as needed for Wheezing.  azelastine (ASTELIN) 137 MCG/SPRAY nasal spray 1 spray by Nasal route as needed. Use in each nostril as directed       No current facility-administered medications for this visit. Allergies   Allergen Reactions    Shellfish-Derived Products Swelling    Pcn [Penicillins] Itching    Succinylcholine      Pseudocholinesterase Deficiency    Morphine Nausea And Vomiting     \"head swimming, skin crawling\"    Tape [Adhesive Tape] Rash       SUBJECTIVE:   Review of Systems   Constitutional: Positive for fatigue. Negative for activity change and appetite change. Respiratory: Positive for shortness of breath (AKINS). Negative for cough.     Cardiovascular: there is a 30% to  40% stenosis followed by mild luminal irregularities in the distal  portion of the LAD. 5.  LV gram was not performed due to renal dysfunction.     The patient tolerated the procedure well without any significant issues  or complications.  Hemostasis was achieved with a Vasc Band device.     SUMMARY:  1.  Elevated right heart pressures with elevated LVEPD. 2.  Patent coronaries.     RECOMMENDATIONS:  1.  IV diuresis. (Lasix 80 IVP x 1)  2.  Monitor electrolytes. 3.  Optimize heart failure therapy. 4.  Weight loss advised. 5.  Optimize sleep apnea therapies. 6.  Follow up in clinic in one to two weeks to evaluate symptoms  further.     Darcy Chavez MD     D: 12/06/2020 21:57:38       T: 12/06/2020 23:06:51     [email or web address]  Job#: [id number]     Doc#: 22     Results reviewed:  BNP: No results found for: BNP  CBC:   Lab Results   Component Value Date    WBC 8.8 12/01/2020    RBC 5.29 12/01/2020    RBC 5.15 08/12/2011    HGB 15.4 12/01/2020    HCT 45.7 12/01/2020     12/01/2020     CMP:    Lab Results   Component Value Date     01/27/2021    K 4.1 01/27/2021    K 2.9 12/01/2020    CL 97 01/27/2021    CO2 31 01/27/2021    BUN 9 01/27/2021    CREATININE 1.1 01/27/2021    LABGLOM 69 01/27/2021    GLUCOSE 286 01/27/2021    CALCIUM 9.1 01/27/2021     Hepatic Function Panel:    Lab Results   Component Value Date    ALKPHOS 91 07/02/2019    ALT 28 07/02/2019    AST 30 07/02/2019    PROT 7.9 07/02/2019    BILITOT 0.5 07/02/2019    BILIDIR <0.2 09/06/2017    LABALBU 3.9 07/02/2019     Magnesium:    Lab Results   Component Value Date    MG 2.0 01/13/2021     PT/INR:    Lab Results   Component Value Date    INR 1.06 12/01/2020     Lipids:    Lab Results   Component Value Date    TRIG 95 06/27/2018    HDL 34 06/27/2018    LDLCALC 71 06/27/2018       ASSESSMENT AND PLAN:   The patient's condition/symptoms are Stable: No clinical evidence of fluid overload today.  Continue current medical regimen needed     Patient given educational materials - see patient instructions. We discussed the importance of weighing oneself and recording daily. We also discussed the importance of a low sodium diet, higher sodium foods to avoid and better low sodium food options. Patient verbalizes understanding of plan of care using teach back method, and is agreeable to the treatment plan.        Electronically signed by SAHIL Salmeron CNP on 2/11/2021 at 10:01 PM

## 2021-02-19 ENCOUNTER — HOSPITAL ENCOUNTER (OUTPATIENT)
Dept: SLEEP CENTER | Age: 61
Discharge: HOME | End: 2021-02-21
Payer: MEDICARE

## 2021-02-19 DIAGNOSIS — G47.33 OBSTRUCTIVE SLEEP APNEA: ICD-10-CM

## 2021-02-19 PROCEDURE — 95810 POLYSOM 6/> YRS 4/> PARAM: CPT

## 2021-02-20 NOTE — PROGRESS NOTES
[facility]. Title:  CPAP/BiLevel Titration's    Approved by:  Elbert Schwab MD      Approval Date:  December, 2019 Next Review:  December, 2021     Responsible Party:  [facility] Applies to:   Heather Wiley Number:  None    Document Type:  Such as Guideline, Policy, Policy & Procedure, or Procedure, Instructions  Manual:  Policy and Procedures    Section: IV Policy Start Date: October, 2266           POLICY: Positive airway pressure device is used to treat patients with sleep related breathing disorders characterized by full or partial occlusion of the upper airway during sleep. A patient must have undergone polysomnography and diagnosed with obstructive sleep apnea. All individuals who record sleep studies must follow best practices for CPAP/bilevel/ASV titrations in order to attain the ideal pressure setting for their patients. Too low of pressure may cause patients to either be sub-optimally treated or to wake up in a panic. Too much pressure may cause the patient to experience bloating or mask leakage. Determining the appropriate pressure setting for each patient will lead to improved adherence and outcome. Titrations are not an exact science, and it is understood that technologists may need to make minor changes for individual patients. The procedures outlined below are meant to be a guideline and follow the spirit of the AASM clinical guidelines. PROCEDURE:    CPAP:    1. Review the patients pertinent medical al history, previous sleep study or studies to ass the severity of sleep disordered breathing. Review of pertinent information will help to attain a better titration.    2. Applications of electrodes, montages, filters, sensitivities and scoring will be performed according to the current version of the AASM Scoring Manual.   3. Prior to initiating study collect all appropriate PAP supplies a. Tubing   b. Humidifier (filled with distilled water)  c. Masks   4. The technologist should assess and measure patient for most appropriate mask prior to start of study. 5. CPAP should be initiated at 5 cm H20.  a. More pressure at start of study may be necessary if patient is morbidly obese or unable to fall asleep on a pressure of 5 cm H20   6. If apneas or frequent hypopneas are present, pressure settings should be increased by 2 cm H20. If occasional hypopneas, snoring, or mask flow limitation are present, pressure settings should be increased by 1 cm H20 and maintained for at least fie minutes to determine if events improve or resolve. Pressure settings may need to be increased more quickly during REM sleep given the limited amount of REM during sleep and the need to treat events during this stage. 7. If a mask leak occurs, the tech should first fix the leakage before raising the pressure. Otherwise, the final pressure setting chosen for the patient may be too high. Once the mask leak has been fixed, decrease the pressure to the last setting where mouth breathing and/or mask leakage was not present, and then re-titrate as indicated. Make sure to document directly on the study the steps taken to resolve the leak and the type of masks used. Pressure setting usually do not need to be set as high with a nasal-mask than with a full-face mask. 8. The recording technologist should document directly on the study at least every 30 minutes. 9. If the patient takes a break from wearing the mask, do not decrease the CPAP pressure on attempted return to sleep unless the patient remains awake for 15 minutes or the patient specifically requests that the pressure be lowered. 10. Do not raise pressure for central apneas. If the patient develops central apneas, pressure setting may need to be lowered.    11. If the patient is unable to tolerate CPAP secondary due to: a. Persistent mouth breathing despite use of a full-face mask/chin strap  b. Inability to exhale against higher expiratory pressures (typically beginning anywhere from 15 to 20 cm of H20.  c. Has frequent central apneas, the use of bilevel positive airway pressure may be indicated. Document directly on study why the patient is being switched from CPAP to bilevel. 12. Ensure that supine sleep has been seen on the chosen setting. Going above the chosen setting 1 or 2 cm H20 to show range may be helpful to ensure that the correct pressure has been established. BiLEVEL:    1. Technologist may change from CPAP to bilevel during a study if proven the patient is unable to tolerate CPAP. 2. Review the patients pertinent medical al history, previous sleep study or studies to ass the severity of sleep disordered breathing. Review of pertinent information will help to attain a better titration. 3. Applications of electrodes, montages, filters, sensitivities and scoring will be performed according to the current version of the AASM Scoring Manual.   4. Prior to initiating study collect all appropriate PAP supplies  a. Tubing   b. Humidifier (filled with distilled water)  c. Masks   5. The technologist should assess and measure patient for most appropriate mask prior to start of study. 6. If the patient has not previously been on CPAP, beginning pressures should be 8/4 cm H20 or higher if patient is morbidly obese or unable to fall asleep at lower pressures. If the patient has been previously successfully treated on CPAP start expiratory pressure at therapeutic setting and set inspiratory pressure 4 cm H20 higher. If advancing from CPAP to bilevel during a study expiratory pressure should be set at most successful CPAP setting and inspiratory pressure set 4 cm h20 higher. 7. The standard differential pressure utilized during bilevel titrations typically ranges from 3 to 5 cm H20, with 4 cm H20 being the most common. Higher differential pressures may be needed in patients who are morbidly obese or who have neuromuscular diseases. 8. If apneas or frequent hypopneas are present, inspiratory and expiratory pressure settings should be increased by 2 cm H20. If occasional hypopneas, snoring, or mask flow limitation are present inspiratory and expiratory pressures settings should be increased by 1 cm h20 and maintained for at least 5 minutes to determine if events improve or resolve. Pressure settings may need to be increased more quickly during REM sleep given the limited amount of REM during sleep and the need to treat events during this stage. 9. If a mask leak occurs, the tech should first fix the leakage before raising the pressure. Otherwise, the final pressure setting chosen for the patient may be too high. Once the mask leak has been fixed, decrease the pressure to the last setting where mouth breathing and/or mask leakage was not present, and then re-titrate as indicated. Make sure to document directly on the study the steps taken to resolve the leak and the type of masks used. Pressure setting usually do not need to be set as high with a nasal-mask than with a full-face mask. 10. The recording technologist should document directly on the study at least every 30 minutes. 11. If the patient takes a break from wearing the mask, do not decrease the CPAP pressure on attempted return to sleep unless the patient remains awake for 15 minutes or the patient specifically requests that the pressure be lowered. 12. Do not raise pressure for central apneas. If the patient develops central apneas, pressure setting may need to be lowered. If the patient has central apneas on bilevel, the use of spontaneous (ST) mode may be indicated. a.  ST mode may only be used in 2 cases i. An order for ST mode with a primary diagnosis of central sleep apnea  ii. During a titration if obstructive events are less than 5/ hour and centrals must be greater than 50% of total respiratory events. 13. Ensure that supine sleep has been seen on the chosen setting. Going above the chosen setting 1 or 2 cm H20 to show range may be helpful to ensure that the correct pressure has been established.

## 2021-02-22 DIAGNOSIS — G47.30 SLEEP APNEA, UNSPECIFIED TYPE: ICD-10-CM

## 2021-02-22 DIAGNOSIS — G47.33 OSA (OBSTRUCTIVE SLEEP APNEA): Primary | ICD-10-CM

## 2021-02-22 DIAGNOSIS — I10 ESSENTIAL HYPERTENSION: ICD-10-CM

## 2021-02-22 LAB — STATUS: NORMAL

## 2021-02-23 NOTE — PROGRESS NOTES
desaturation of 4 percent or greater. INTERPRETATION:  This is a baseline sleep study, and the study was  performed on 02/19/2021. The study was started at 09:23 p.m. and was  terminated at 04:36 a.m. with a total recording time of 433.2 minutes,  sleep period time was 398.8 minutes, total sleep time was 240 minutes,  and overall sleep efficiency was 55.4%. The sleep onset latency was  34.5 minutes, wake after sleep onset was 158.8 minutes, and REM sleep  latency was 392 minutes. SLEEP STAGING AND DISTRIBUTION SUMMARY:  Revealed the patient spent  136.5 minutes in stage I consisting of 56.9%, 100 minutes in stage II  consisting of 41.7%, 3.5 minutes in REM sleep consisting of 1.5% of  total sleep time. The patient had absent slow-wave sleep. RESPIRATORY EVENT ANALYSIS:  Revealed the patient had a total of 5  apneas. Out of 5, 4 were obstructive and one was central in nature. The patient also had a total of 223 hypopneas, all of them were  obstructive in nature. The total number of apneas and hypopneas  recorded during the study was 228 with an apnea-hypopnea index of 57. The patient's REM sleep apnea-hypopnea index was 51.4. POSITION ANALYSIS:  Revealed the patient spent 168 minutes in supine  position and 2.8 minutes in left lateral position, 69.3 minutes in right  lateral position with a supine apnea-hypopnea index of 72.2 whereas left  lateral position apnea-hypopnea index was 43.4, right lateral position  apnea-hypopnea index was 25.1. PERIODIC LIMB MOVEMENT ANALYSIS:  Revealed the patient had a total of 0  periodic limb movements. The patient had a total of 39 spontaneous  arousals with a spontaneous arousal index of 9.8. OXYGEN SATURATION MONITORING:  Revealed the patient had a maximum oxygen  desaturation to 66% with a mean oxygen saturation of 88.9%. The patient  spent a total of 106.3 minutes below oxygen saturation less than 88%. EKG MONITORING:  Revealed normal sinus rhythm. The patient had  occasional premature ventricular contractions. The patient was found to have soft snoring during the sleep study. The patient also complained of shoulder pain and back pain, and the  patient was also restless during the sleep study. IMPRESSION:  1. Severe obstructive sleep apnea with worsening of respiratory events  in supine position. 2.  Decreased and delayed REM sleep. 3.  Nocturnal hypoxia. The patient spent a total of 106.3 minutes below  oxygen saturation less than 88%. 4.  Hypertension. RECOMMENDATIONS:  1. For the patient's sleep-disordered breathing, the patient needs  treatment. 2.  The patient is currently waiting for a new CPAP device for which the  patient is currently waiting for re-titration study. 3.  The patient will be scheduled for an in-lab CPAP/BiPAP titration  study as soon as possible to get optimal PAP device pressure settings to  issue a new CPAP device/BiPAP pressure. Thanks to Zuleima Chilel PA-C, for giving me this opportunity to  participate in the care of this pleasant gentleman.         Yuni Levy MD    D: 02/22/2021 17:45:57       T: 02/23/2021 3:17:44     [email or web address]  Job#: [id number]     Doc#: [id number]    CC:

## 2021-03-02 ENCOUNTER — APPOINTMENT (OUTPATIENT)
Dept: GENERAL RADIOLOGY | Age: 61
DRG: 291 | End: 2021-03-02
Payer: MEDICARE

## 2021-03-02 ENCOUNTER — APPOINTMENT (OUTPATIENT)
Dept: CT IMAGING | Age: 61
DRG: 291 | End: 2021-03-02
Payer: MEDICARE

## 2021-03-02 ENCOUNTER — HOSPITAL ENCOUNTER (INPATIENT)
Age: 61
LOS: 1 days | Discharge: HOME | DRG: 291 | End: 2021-03-03
Attending: EMERGENCY MEDICINE | Admitting: INTERNAL MEDICINE
Payer: MEDICARE

## 2021-03-02 DIAGNOSIS — J96.01 ACUTE RESPIRATORY FAILURE WITH HYPOXIA (HCC): ICD-10-CM

## 2021-03-02 DIAGNOSIS — J18.9 PNEUMONIA DUE TO ORGANISM: ICD-10-CM

## 2021-03-02 DIAGNOSIS — I50.23 ACUTE ON CHRONIC SYSTOLIC CONGESTIVE HEART FAILURE (HCC): Primary | ICD-10-CM

## 2021-03-02 PROBLEM — R06.00 DYSPNEA: Status: ACTIVE | Noted: 2021-03-02

## 2021-03-02 LAB
ALBUMIN SERPL-MCNC: 3.6 G/DL (ref 3.5–5.1)
ALLEN TEST: POSITIVE
ALLEN TEST: POSITIVE
ALP BLD-CCNC: 67 U/L (ref 38–126)
ALT SERPL-CCNC: 23 U/L (ref 11–66)
AMORPHOUS: ABNORMAL
ANION GAP SERPL CALCULATED.3IONS-SCNC: 9 MEQ/L (ref 8–16)
AST SERPL-CCNC: 16 U/L (ref 5–40)
BACTERIA: ABNORMAL /HPF
BASE EXCESS (CALCULATED): 1.8 MMOL/L (ref -2.5–2.5)
BASE EXCESS (CALCULATED): 2.2 MMOL/L (ref -2.5–2.5)
BASOPHILS # BLD: 0.4 %
BASOPHILS ABSOLUTE: 0 THOU/MM3 (ref 0–0.1)
BILIRUB SERPL-MCNC: 0.7 MG/DL (ref 0.3–1.2)
BILIRUBIN DIRECT: < 0.2 MG/DL (ref 0–0.3)
BILIRUBIN URINE: NEGATIVE
BLOOD, URINE: NEGATIVE
BUN BLDV-MCNC: 11 MG/DL (ref 7–22)
CALCIUM SERPL-MCNC: 8.8 MG/DL (ref 8.5–10.5)
CASTS UA: ABNORMAL /LPF
CHARACTER, URINE: CLEAR
CHLORIDE BLD-SCNC: 103 MEQ/L (ref 98–111)
CO2: 25 MEQ/L (ref 23–33)
COLLECTED BY:: ABNORMAL
COLLECTED BY:: ABNORMAL
COLOR: YELLOW
CREAT SERPL-MCNC: 1.1 MG/DL (ref 0.4–1.2)
CRYSTALS, UA: ABNORMAL
D-DIMER QUANTITATIVE: 1440 NG/ML FEU (ref 0–500)
DEVICE: ABNORMAL
DEVICE: ABNORMAL
EKG ATRIAL RATE: 59 BPM
EKG ATRIAL RATE: 88 BPM
EKG P AXIS: 23 DEGREES
EKG P AXIS: 24 DEGREES
EKG P-R INTERVAL: 152 MS
EKG P-R INTERVAL: 164 MS
EKG Q-T INTERVAL: 376 MS
EKG Q-T INTERVAL: 462 MS
EKG QRS DURATION: 104 MS
EKG QRS DURATION: 108 MS
EKG QTC CALCULATION (BAZETT): 454 MS
EKG QTC CALCULATION (BAZETT): 457 MS
EKG R AXIS: -2 DEGREES
EKG R AXIS: 19 DEGREES
EKG T AXIS: 24 DEGREES
EKG T AXIS: 35 DEGREES
EKG VENTRICULAR RATE: 59 BPM
EKG VENTRICULAR RATE: 88 BPM
EOSINOPHIL # BLD: 0.9 %
EOSINOPHILS ABSOLUTE: 0.1 THOU/MM3 (ref 0–0.4)
EPITHELIAL CELLS, UA: ABNORMAL /HPF
ERYTHROCYTE [DISTWIDTH] IN BLOOD BY AUTOMATED COUNT: 15.5 % (ref 11.5–14.5)
ERYTHROCYTE [DISTWIDTH] IN BLOOD BY AUTOMATED COUNT: 49.7 FL (ref 35–45)
FLU A ANTIGEN: NEGATIVE
FLU B ANTIGEN: NEGATIVE
GFR SERPL CREATININE-BSD FRML MDRD: 69 ML/MIN/1.73M2
GLUCOSE BLD-MCNC: 107 MG/DL (ref 70–108)
GLUCOSE BLD-MCNC: 110 MG/DL (ref 70–108)
GLUCOSE BLD-MCNC: 169 MG/DL (ref 70–108)
GLUCOSE BLD-MCNC: 98 MG/DL (ref 70–108)
GLUCOSE URINE: NEGATIVE MG/DL
HCO3: 25 MMOL/L (ref 23–28)
HCO3: 27 MMOL/L (ref 23–28)
HCT VFR BLD CALC: 39.7 % (ref 42–52)
HEMOGLOBIN: 12.9 GM/DL (ref 14–18)
IFIO2: 21
IFIO2: 8
IMMATURE GRANS (ABS): 0.05 THOU/MM3 (ref 0–0.07)
IMMATURE GRANULOCYTES: 0.5 %
KETONES, URINE: NEGATIVE
LACTIC ACID, SEPSIS: 1.1 MMOL/L (ref 0.5–1.9)
LACTIC ACID, SEPSIS: 1.5 MMOL/L (ref 0.5–1.9)
LEUKOCYTE ESTERASE, URINE: NEGATIVE
LIPASE: 20.4 U/L (ref 5.6–51.3)
LYMPHOCYTES # BLD: 5.4 %
LYMPHOCYTES ABSOLUTE: 0.6 THOU/MM3 (ref 1–4.8)
MAGNESIUM: 2 MG/DL (ref 1.6–2.4)
MCH RBC QN AUTO: 29 PG (ref 26–33)
MCHC RBC AUTO-ENTMCNC: 32.5 GM/DL (ref 32.2–35.5)
MCV RBC AUTO: 89.2 FL (ref 80–94)
MONOCYTES # BLD: 9.6 %
MONOCYTES ABSOLUTE: 1 THOU/MM3 (ref 0.4–1.3)
MUCUS: ABNORMAL
NITRITE, URINE: NEGATIVE
NUCLEATED RED BLOOD CELLS: 0 /100 WBC
O2 SATURATION: 100 %
O2 SATURATION: 94 %
OSMOLALITY CALCULATION: 277.1 MOSMOL/KG (ref 275–300)
PCO2: 31 MMHG (ref 35–45)
PCO2: 41 MMHG (ref 35–45)
PH BLOOD GAS: 7.42 (ref 7.35–7.45)
PH BLOOD GAS: 7.51 (ref 7.35–7.45)
PH UA: 6.5 (ref 5–9)
PLATELET # BLD: 255 THOU/MM3 (ref 130–400)
PMV BLD AUTO: 9.2 FL (ref 9.4–12.4)
PO2: 144 MMHG (ref 71–104)
PO2: 69 MMHG (ref 71–104)
POTASSIUM SERPL-SCNC: 4 MEQ/L (ref 3.5–5.2)
PRO-BNP: 1279 PG/ML (ref 0–900)
PROCALCITONIN: 0.1 NG/ML (ref 0.01–0.09)
PROTEIN UA: ABNORMAL
RBC # BLD: 4.45 MILL/MM3 (ref 4.7–6.1)
RBC URINE: ABNORMAL /HPF
SARS-COV-2, NAAT: NOT DETECTED
SEG NEUTROPHILS: 83.2 %
SEGMENTED NEUTROPHILS ABSOLUTE COUNT: 8.9 THOU/MM3 (ref 1.8–7.7)
SODIUM BLD-SCNC: 137 MEQ/L (ref 135–145)
SOURCE, BLOOD GAS: ABNORMAL
SOURCE, BLOOD GAS: ABNORMAL
SPECIFIC GRAVITY, URINE: 1.01 (ref 1–1.03)
TOTAL PROTEIN: 7.5 G/DL (ref 6.1–8)
TROPONIN T: < 0.01 NG/ML
UROBILINOGEN, URINE: 0.2 EU/DL (ref 0–1)
WBC # BLD: 10.7 THOU/MM3 (ref 4.8–10.8)
WBC UA: ABNORMAL /HPF

## 2021-03-02 PROCEDURE — 6370000000 HC RX 637 (ALT 250 FOR IP): Performed by: NURSE PRACTITIONER

## 2021-03-02 PROCEDURE — 84145 PROCALCITONIN (PCT): CPT

## 2021-03-02 PROCEDURE — 36600 WITHDRAWAL OF ARTERIAL BLOOD: CPT

## 2021-03-02 PROCEDURE — 71275 CT ANGIOGRAPHY CHEST: CPT

## 2021-03-02 PROCEDURE — 6360000002 HC RX W HCPCS: Performed by: NURSE PRACTITIONER

## 2021-03-02 PROCEDURE — 93005 ELECTROCARDIOGRAM TRACING: CPT | Performed by: NURSE PRACTITIONER

## 2021-03-02 PROCEDURE — 87635 SARS-COV-2 COVID-19 AMP PRB: CPT

## 2021-03-02 PROCEDURE — 83605 ASSAY OF LACTIC ACID: CPT

## 2021-03-02 PROCEDURE — 2500000003 HC RX 250 WO HCPCS: Performed by: INTERNAL MEDICINE

## 2021-03-02 PROCEDURE — 96375 TX/PRO/DX INJ NEW DRUG ADDON: CPT

## 2021-03-02 PROCEDURE — 93005 ELECTROCARDIOGRAM TRACING: CPT | Performed by: EMERGENCY MEDICINE

## 2021-03-02 PROCEDURE — 80053 COMPREHEN METABOLIC PANEL: CPT

## 2021-03-02 PROCEDURE — 83880 ASSAY OF NATRIURETIC PEPTIDE: CPT

## 2021-03-02 PROCEDURE — 6360000002 HC RX W HCPCS: Performed by: EMERGENCY MEDICINE

## 2021-03-02 PROCEDURE — 6360000004 HC RX CONTRAST MEDICATION: Performed by: NURSE PRACTITIONER

## 2021-03-02 PROCEDURE — 6370000000 HC RX 637 (ALT 250 FOR IP): Performed by: EMERGENCY MEDICINE

## 2021-03-02 PROCEDURE — 94660 CPAP INITIATION&MGMT: CPT

## 2021-03-02 PROCEDURE — 87804 INFLUENZA ASSAY W/OPTIC: CPT

## 2021-03-02 PROCEDURE — 87040 BLOOD CULTURE FOR BACTERIA: CPT

## 2021-03-02 PROCEDURE — 81001 URINALYSIS AUTO W/SCOPE: CPT

## 2021-03-02 PROCEDURE — 83735 ASSAY OF MAGNESIUM: CPT

## 2021-03-02 PROCEDURE — 82803 BLOOD GASES ANY COMBINATION: CPT

## 2021-03-02 PROCEDURE — 99285 EMERGENCY DEPT VISIT HI MDM: CPT

## 2021-03-02 PROCEDURE — 1200000003 HC TELEMETRY R&B

## 2021-03-02 PROCEDURE — 71045 X-RAY EXAM CHEST 1 VIEW: CPT

## 2021-03-02 PROCEDURE — 99223 1ST HOSP IP/OBS HIGH 75: CPT | Performed by: NURSE PRACTITIONER

## 2021-03-02 PROCEDURE — 36415 COLL VENOUS BLD VENIPUNCTURE: CPT

## 2021-03-02 PROCEDURE — 85025 COMPLETE CBC W/AUTO DIFF WBC: CPT

## 2021-03-02 PROCEDURE — 82248 BILIRUBIN DIRECT: CPT

## 2021-03-02 PROCEDURE — 2580000003 HC RX 258: Performed by: NURSE PRACTITIONER

## 2021-03-02 PROCEDURE — 94760 N-INVAS EAR/PLS OXIMETRY 1: CPT

## 2021-03-02 PROCEDURE — 83690 ASSAY OF LIPASE: CPT

## 2021-03-02 PROCEDURE — 94640 AIRWAY INHALATION TREATMENT: CPT

## 2021-03-02 PROCEDURE — 96365 THER/PROPH/DIAG IV INF INIT: CPT

## 2021-03-02 PROCEDURE — 84484 ASSAY OF TROPONIN QUANT: CPT

## 2021-03-02 PROCEDURE — 6370000000 HC RX 637 (ALT 250 FOR IP): Performed by: INTERNAL MEDICINE

## 2021-03-02 PROCEDURE — 85379 FIBRIN DEGRADATION QUANT: CPT

## 2021-03-02 PROCEDURE — 71046 X-RAY EXAM CHEST 2 VIEWS: CPT

## 2021-03-02 PROCEDURE — 82948 REAGENT STRIP/BLOOD GLUCOSE: CPT

## 2021-03-02 RX ORDER — POTASSIUM CHLORIDE 750 MG/1
30 TABLET, FILM COATED, EXTENDED RELEASE ORAL DAILY
Status: DISCONTINUED | OUTPATIENT
Start: 2021-03-02 | End: 2021-03-03 | Stop reason: HOSPADM

## 2021-03-02 RX ORDER — DEXTROSE MONOHYDRATE 50 MG/ML
100 INJECTION, SOLUTION INTRAVENOUS PRN
Status: DISCONTINUED | OUTPATIENT
Start: 2021-03-02 | End: 2021-03-03 | Stop reason: HOSPADM

## 2021-03-02 RX ORDER — AMITRIPTYLINE HYDROCHLORIDE 10 MG/1
10 TABLET, FILM COATED ORAL NIGHTLY
Status: DISCONTINUED | OUTPATIENT
Start: 2021-03-02 | End: 2021-03-03 | Stop reason: HOSPADM

## 2021-03-02 RX ORDER — OXYBUTYNIN CHLORIDE 10 MG/1
10 TABLET, EXTENDED RELEASE ORAL DAILY
Status: DISCONTINUED | OUTPATIENT
Start: 2021-03-02 | End: 2021-03-03 | Stop reason: HOSPADM

## 2021-03-02 RX ORDER — NICOTINE 21 MG/24HR
1 PATCH, TRANSDERMAL 24 HOURS TRANSDERMAL DAILY
Status: DISCONTINUED | OUTPATIENT
Start: 2021-03-02 | End: 2021-03-03 | Stop reason: HOSPADM

## 2021-03-02 RX ORDER — GABAPENTIN 300 MG/1
300 CAPSULE ORAL NIGHTLY
Status: DISCONTINUED | OUTPATIENT
Start: 2021-03-02 | End: 2021-03-03 | Stop reason: HOSPADM

## 2021-03-02 RX ORDER — CLONIDINE HYDROCHLORIDE 0.1 MG/1
0.1 TABLET ORAL 3 TIMES DAILY
Status: DISCONTINUED | OUTPATIENT
Start: 2021-03-02 | End: 2021-03-03 | Stop reason: HOSPADM

## 2021-03-02 RX ORDER — FUROSEMIDE 10 MG/ML
40 INJECTION INTRAMUSCULAR; INTRAVENOUS ONCE
Status: COMPLETED | OUTPATIENT
Start: 2021-03-02 | End: 2021-03-02

## 2021-03-02 RX ORDER — POTASSIUM CHLORIDE 7.45 MG/ML
10 INJECTION INTRAVENOUS PRN
Status: DISCONTINUED | OUTPATIENT
Start: 2021-03-02 | End: 2021-03-03 | Stop reason: HOSPADM

## 2021-03-02 RX ORDER — BUMETANIDE 0.25 MG/ML
2 INJECTION, SOLUTION INTRAMUSCULAR; INTRAVENOUS EVERY 8 HOURS
Status: DISCONTINUED | OUTPATIENT
Start: 2021-03-02 | End: 2021-03-03

## 2021-03-02 RX ORDER — TAMSULOSIN HYDROCHLORIDE 0.4 MG/1
0.4 CAPSULE ORAL NIGHTLY
Status: DISCONTINUED | OUTPATIENT
Start: 2021-03-02 | End: 2021-03-03 | Stop reason: HOSPADM

## 2021-03-02 RX ORDER — IPRATROPIUM BROMIDE AND ALBUTEROL SULFATE 2.5; .5 MG/3ML; MG/3ML
1 SOLUTION RESPIRATORY (INHALATION) ONCE
Status: COMPLETED | OUTPATIENT
Start: 2021-03-02 | End: 2021-03-02

## 2021-03-02 RX ORDER — AMLODIPINE BESYLATE 10 MG/1
10 TABLET ORAL DAILY
Status: DISCONTINUED | OUTPATIENT
Start: 2021-03-02 | End: 2021-03-02

## 2021-03-02 RX ORDER — CYCLOBENZAPRINE HCL 10 MG
10 TABLET ORAL 3 TIMES DAILY PRN
Status: DISCONTINUED | OUTPATIENT
Start: 2021-03-02 | End: 2021-03-03 | Stop reason: HOSPADM

## 2021-03-02 RX ORDER — ACETAMINOPHEN 650 MG/1
650 SUPPOSITORY RECTAL EVERY 6 HOURS PRN
Status: DISCONTINUED | OUTPATIENT
Start: 2021-03-02 | End: 2021-03-03 | Stop reason: HOSPADM

## 2021-03-02 RX ORDER — ONDANSETRON 2 MG/ML
4 INJECTION INTRAMUSCULAR; INTRAVENOUS EVERY 6 HOURS PRN
Status: DISCONTINUED | OUTPATIENT
Start: 2021-03-02 | End: 2021-03-03 | Stop reason: HOSPADM

## 2021-03-02 RX ORDER — LOSARTAN POTASSIUM 25 MG/1
25 TABLET ORAL DAILY
Status: DISCONTINUED | OUTPATIENT
Start: 2021-03-02 | End: 2021-03-03 | Stop reason: HOSPADM

## 2021-03-02 RX ORDER — ACETAMINOPHEN 325 MG/1
TABLET ORAL
Status: DISPENSED
Start: 2021-03-02 | End: 2021-03-02

## 2021-03-02 RX ORDER — ASPIRIN 81 MG/1
81 TABLET ORAL DAILY
Status: DISCONTINUED | OUTPATIENT
Start: 2021-03-02 | End: 2021-03-03 | Stop reason: HOSPADM

## 2021-03-02 RX ORDER — ROPINIROLE 0.5 MG/1
0.5 TABLET, FILM COATED ORAL 3 TIMES DAILY
Status: DISCONTINUED | OUTPATIENT
Start: 2021-03-02 | End: 2021-03-03 | Stop reason: HOSPADM

## 2021-03-02 RX ORDER — MAGNESIUM SULFATE IN WATER 40 MG/ML
2000 INJECTION, SOLUTION INTRAVENOUS PRN
Status: DISCONTINUED | OUTPATIENT
Start: 2021-03-02 | End: 2021-03-03 | Stop reason: HOSPADM

## 2021-03-02 RX ORDER — NICOTINE POLACRILEX 4 MG
15 LOZENGE BUCCAL PRN
Status: DISCONTINUED | OUTPATIENT
Start: 2021-03-02 | End: 2021-03-03 | Stop reason: HOSPADM

## 2021-03-02 RX ORDER — OXYCODONE AND ACETAMINOPHEN 10; 325 MG/1; MG/1
1 TABLET ORAL EVERY 8 HOURS PRN
Status: DISCONTINUED | OUTPATIENT
Start: 2021-03-02 | End: 2021-03-03 | Stop reason: HOSPADM

## 2021-03-02 RX ORDER — SODIUM CHLORIDE 0.9 % (FLUSH) 0.9 %
10 SYRINGE (ML) INJECTION PRN
Status: DISCONTINUED | OUTPATIENT
Start: 2021-03-02 | End: 2021-03-03 | Stop reason: HOSPADM

## 2021-03-02 RX ORDER — POLYETHYLENE GLYCOL 3350 17 G/17G
17 POWDER, FOR SOLUTION ORAL DAILY PRN
Status: DISCONTINUED | OUTPATIENT
Start: 2021-03-02 | End: 2021-03-03 | Stop reason: HOSPADM

## 2021-03-02 RX ORDER — SPIRONOLACTONE 25 MG/1
50 TABLET ORAL 2 TIMES DAILY
Status: DISCONTINUED | OUTPATIENT
Start: 2021-03-02 | End: 2021-03-03 | Stop reason: HOSPADM

## 2021-03-02 RX ORDER — LORAZEPAM 0.5 MG/1
0.5 TABLET ORAL NIGHTLY PRN
Status: DISCONTINUED | OUTPATIENT
Start: 2021-03-02 | End: 2021-03-03 | Stop reason: HOSPADM

## 2021-03-02 RX ORDER — ACETAMINOPHEN 325 MG/1
650 TABLET ORAL EVERY 6 HOURS PRN
Status: DISCONTINUED | OUTPATIENT
Start: 2021-03-02 | End: 2021-03-03 | Stop reason: HOSPADM

## 2021-03-02 RX ORDER — PANTOPRAZOLE SODIUM 40 MG/1
40 TABLET, DELAYED RELEASE ORAL
Status: DISCONTINUED | OUTPATIENT
Start: 2021-03-02 | End: 2021-03-03 | Stop reason: HOSPADM

## 2021-03-02 RX ORDER — ACETAMINOPHEN 325 MG/1
650 TABLET ORAL ONCE
Status: COMPLETED | OUTPATIENT
Start: 2021-03-02 | End: 2021-03-02

## 2021-03-02 RX ORDER — AZELASTINE 1 MG/ML
1 SPRAY, METERED NASAL 2 TIMES DAILY
Status: DISCONTINUED | OUTPATIENT
Start: 2021-03-02 | End: 2021-03-02 | Stop reason: RX

## 2021-03-02 RX ORDER — PROMETHAZINE HYDROCHLORIDE 25 MG/1
12.5 TABLET ORAL EVERY 6 HOURS PRN
Status: DISCONTINUED | OUTPATIENT
Start: 2021-03-02 | End: 2021-03-03 | Stop reason: HOSPADM

## 2021-03-02 RX ORDER — POTASSIUM CHLORIDE 20 MEQ/1
40 TABLET, EXTENDED RELEASE ORAL PRN
Status: DISCONTINUED | OUTPATIENT
Start: 2021-03-02 | End: 2021-03-03 | Stop reason: HOSPADM

## 2021-03-02 RX ORDER — FUROSEMIDE 10 MG/ML
40 INJECTION INTRAMUSCULAR; INTRAVENOUS 2 TIMES DAILY
Status: DISCONTINUED | OUTPATIENT
Start: 2021-03-02 | End: 2021-03-02

## 2021-03-02 RX ORDER — DEXTROSE MONOHYDRATE 25 G/50ML
12.5 INJECTION, SOLUTION INTRAVENOUS PRN
Status: DISCONTINUED | OUTPATIENT
Start: 2021-03-02 | End: 2021-03-03 | Stop reason: HOSPADM

## 2021-03-02 RX ORDER — SODIUM CHLORIDE 0.9 % (FLUSH) 0.9 %
10 SYRINGE (ML) INJECTION EVERY 12 HOURS SCHEDULED
Status: DISCONTINUED | OUTPATIENT
Start: 2021-03-02 | End: 2021-03-03 | Stop reason: HOSPADM

## 2021-03-02 RX ORDER — OXYMETAZOLINE HYDROCHLORIDE 0.05 G/100ML
2 SPRAY NASAL 2 TIMES DAILY PRN
Status: DISCONTINUED | OUTPATIENT
Start: 2021-03-02 | End: 2021-03-03 | Stop reason: HOSPADM

## 2021-03-02 RX ORDER — NITROGLYCERIN 0.4 MG/1
0.4 TABLET SUBLINGUAL EVERY 5 MIN PRN
Status: DISCONTINUED | OUTPATIENT
Start: 2021-03-02 | End: 2021-03-03 | Stop reason: HOSPADM

## 2021-03-02 RX ORDER — LEVOFLOXACIN 5 MG/ML
500 INJECTION, SOLUTION INTRAVENOUS ONCE
Status: COMPLETED | OUTPATIENT
Start: 2021-03-02 | End: 2021-03-02

## 2021-03-02 RX ORDER — CARVEDILOL 25 MG/1
25 TABLET ORAL 2 TIMES DAILY
Status: DISCONTINUED | OUTPATIENT
Start: 2021-03-02 | End: 2021-03-03 | Stop reason: HOSPADM

## 2021-03-02 RX ORDER — GLIMEPIRIDE 4 MG/1
4 TABLET ORAL DAILY
Status: DISCONTINUED | OUTPATIENT
Start: 2021-03-02 | End: 2021-03-03 | Stop reason: HOSPADM

## 2021-03-02 RX ORDER — ZOLPIDEM TARTRATE 6.25 MG/1
12.5 TABLET, FILM COATED, EXTENDED RELEASE ORAL NIGHTLY PRN
Status: DISCONTINUED | OUTPATIENT
Start: 2021-03-02 | End: 2021-03-02 | Stop reason: CLARIF

## 2021-03-02 RX ORDER — HYDRALAZINE HYDROCHLORIDE 50 MG/1
50 TABLET, FILM COATED ORAL EVERY 8 HOURS SCHEDULED
Status: DISCONTINUED | OUTPATIENT
Start: 2021-03-02 | End: 2021-03-03 | Stop reason: HOSPADM

## 2021-03-02 RX ORDER — CLONIDINE HYDROCHLORIDE 0.2 MG/1
0.2 TABLET ORAL 3 TIMES DAILY
Status: DISCONTINUED | OUTPATIENT
Start: 2021-03-02 | End: 2021-03-02

## 2021-03-02 RX ORDER — AMLODIPINE BESYLATE 5 MG/1
5 TABLET ORAL DAILY
Status: DISCONTINUED | OUTPATIENT
Start: 2021-03-03 | End: 2021-03-03 | Stop reason: HOSPADM

## 2021-03-02 RX ADMIN — CLONIDINE HYDROCHLORIDE 0.2 MG: 0.2 TABLET ORAL at 14:47

## 2021-03-02 RX ADMIN — AMLODIPINE BESYLATE 10 MG: 10 TABLET ORAL at 14:47

## 2021-03-02 RX ADMIN — CARVEDILOL 25 MG: 25 TABLET, FILM COATED ORAL at 17:44

## 2021-03-02 RX ADMIN — CLONIDINE HYDROCHLORIDE 0.1 MG: 0.2 TABLET ORAL at 20:50

## 2021-03-02 RX ADMIN — IOPAMIDOL 80 ML: 755 INJECTION, SOLUTION INTRAVENOUS at 07:37

## 2021-03-02 RX ADMIN — GLIMEPIRIDE 4 MG: 4 TABLET ORAL at 14:47

## 2021-03-02 RX ADMIN — LORAZEPAM 0.5 MG: 0.5 TABLET ORAL at 20:50

## 2021-03-02 RX ADMIN — LEVOTHYROXINE SODIUM 175 MCG: 0.03 TABLET ORAL at 14:48

## 2021-03-02 RX ADMIN — ROPINIROLE HYDROCHLORIDE 0.5 MG: 0.5 TABLET, FILM COATED ORAL at 20:51

## 2021-03-02 RX ADMIN — HYDRALAZINE HYDROCHLORIDE 50 MG: 50 TABLET, FILM COATED ORAL at 20:51

## 2021-03-02 RX ADMIN — LOSARTAN POTASSIUM 25 MG: 25 TABLET, FILM COATED ORAL at 14:48

## 2021-03-02 RX ADMIN — ASPIRIN 81 MG: 81 TABLET, COATED ORAL at 14:47

## 2021-03-02 RX ADMIN — BUMETANIDE 2 MG: 0.25 INJECTION, SOLUTION INTRAMUSCULAR; INTRAVENOUS at 20:51

## 2021-03-02 RX ADMIN — IPRATROPIUM BROMIDE AND ALBUTEROL SULFATE 1 AMPULE: .5; 3 SOLUTION RESPIRATORY (INHALATION) at 02:35

## 2021-03-02 RX ADMIN — ACETAMINOPHEN 650 MG: 325 TABLET ORAL at 03:47

## 2021-03-02 RX ADMIN — ENOXAPARIN SODIUM 40 MG: 40 INJECTION SUBCUTANEOUS at 20:50

## 2021-03-02 RX ADMIN — FUROSEMIDE 40 MG: 10 INJECTION, SOLUTION INTRAVENOUS at 04:12

## 2021-03-02 RX ADMIN — LEVOFLOXACIN 500 MG: 5 INJECTION, SOLUTION INTRAVENOUS at 04:41

## 2021-03-02 RX ADMIN — OXYCODONE HYDROCHLORIDE AND ACETAMINOPHEN 1 TABLET: 10; 325 TABLET ORAL at 09:40

## 2021-03-02 RX ADMIN — PANTOPRAZOLE SODIUM 40 MG: 40 TABLET, DELAYED RELEASE ORAL at 14:48

## 2021-03-02 RX ADMIN — CYCLOBENZAPRINE 10 MG: 10 TABLET, FILM COATED ORAL at 20:50

## 2021-03-02 RX ADMIN — GABAPENTIN 300 MG: 300 CAPSULE ORAL at 20:51

## 2021-03-02 RX ADMIN — HYDRALAZINE HYDROCHLORIDE 75 MG: 50 TABLET, FILM COATED ORAL at 14:47

## 2021-03-02 RX ADMIN — SODIUM CHLORIDE, PRESERVATIVE FREE 10 ML: 5 INJECTION INTRAVENOUS at 13:45

## 2021-03-02 RX ADMIN — SODIUM CHLORIDE, PRESERVATIVE FREE 10 ML: 5 INJECTION INTRAVENOUS at 20:52

## 2021-03-02 RX ADMIN — OXYBUTYNIN CHLORIDE 10 MG: 10 TABLET, EXTENDED RELEASE ORAL at 14:48

## 2021-03-02 RX ADMIN — OXYCODONE HYDROCHLORIDE AND ACETAMINOPHEN 1 TABLET: 10; 325 TABLET ORAL at 20:50

## 2021-03-02 RX ADMIN — ROPINIROLE HYDROCHLORIDE 0.5 MG: 0.5 TABLET, FILM COATED ORAL at 14:48

## 2021-03-02 RX ADMIN — SPIRONOLACTONE 50 MG: 25 TABLET ORAL at 20:50

## 2021-03-02 RX ADMIN — AMITRIPTYLINE HYDROCHLORIDE 10 MG: 10 TABLET, FILM COATED ORAL at 20:51

## 2021-03-02 RX ADMIN — TAMSULOSIN HYDROCHLORIDE 0.4 MG: 0.4 CAPSULE ORAL at 20:51

## 2021-03-02 ASSESSMENT — ENCOUNTER SYMPTOMS
RHINORRHEA: 0
DIARRHEA: 0
NAUSEA: 0
BACK PAIN: 0
ABDOMINAL DISTENTION: 0
SINUS PRESSURE: 0
EYE DISCHARGE: 0
EYE PAIN: 0
PHOTOPHOBIA: 0
BLOOD IN STOOL: 0
VOICE CHANGE: 0
CONSTIPATION: 0
COUGH: 1
TROUBLE SWALLOWING: 0
WHEEZING: 0
EYE ITCHING: 0
SHORTNESS OF BREATH: 1
CHOKING: 0
CHEST TIGHTNESS: 0
SORE THROAT: 0
EYE REDNESS: 0
ABDOMINAL PAIN: 0
VOMITING: 0

## 2021-03-02 ASSESSMENT — PAIN SCALES - GENERAL
PAINLEVEL_OUTOF10: 8
PAINLEVEL_OUTOF10: 6
PAINLEVEL_OUTOF10: 8
PAINLEVEL_OUTOF10: 7
PAINLEVEL_OUTOF10: 8
PAINLEVEL_OUTOF10: 4
PAINLEVEL_OUTOF10: 4
PAINLEVEL_OUTOF10: 5

## 2021-03-02 ASSESSMENT — PAIN DESCRIPTION - LOCATION
LOCATION: BACK

## 2021-03-02 ASSESSMENT — PAIN - FUNCTIONAL ASSESSMENT: PAIN_FUNCTIONAL_ASSESSMENT: PREVENTS OR INTERFERES SOME ACTIVE ACTIVITIES AND ADLS

## 2021-03-02 ASSESSMENT — PAIN DESCRIPTION - DESCRIPTORS
DESCRIPTORS: ACHING

## 2021-03-02 ASSESSMENT — PAIN DESCRIPTION - PAIN TYPE
TYPE: CHRONIC PAIN

## 2021-03-02 NOTE — ED NOTES
Pt resting in bed. Lab at bedside. Pt reports chronic back pain increasing in bed. reports he takes percocet for pain at home. Will notify provider.      Apple Costa RN  03/02/21 0164

## 2021-03-02 NOTE — ED PROVIDER NOTES
[facility]  eMERGENCY dEPARTMENT eNCOUnter          CHIEF COMPLAINT       Chief Complaint   Patient presents with    Shortness of Breath       Nurses Notes reviewed and I agree except as noted in the HPI. HISTORY OF PRESENT ILLNESS    Marshall Frandy is a 64 y.o. male who presents for shortness of breath. Apparently he has a cough. He states he has been sick since Sunday. He has been getting worse over the last couple days. Patient states he has had a fever at home. He stated he got up to 104. Patient denies any overt chest pain. He does feel like his chest is tight. Patient denies any COPD. Patient also has some mild heart failure point he states that he has lost weight rather than gained weight. He is not noting any fluid in his lower extremities they do not seem any more edematous than usual.  Patient has some nausea without vomiting. Patient does have the sweats. Patient had no syncopal or presyncopal episodes. Currently the patient is resting comfortably on the cot no apparent distress. He is not on oxygen at home he is requiring oxygen here to keep him between 92 and 96%. Patient is otherwise hemodynamically stable. Patient has had a prior coronary artery catheterization in December of last year. No significant obstructive lesions, the patient is not on any blood thinners, he has not had any stents in the past.      Cardiac catheterization     CORONARY ANATOMY:  1. Right coronary artery is a dominant vessel. The vessel is quite  large and has mild luminal irregularities in the proximal, mid, and  distal portions of the vessel; otherwise, it is patent. 2.  Left main is patent, gives rise to LAD and left circumflex arteries. 3.  Left circumflex artery is patent without any significant disease. 4. LAD is patent in the proximal portion, mid portion there is a 30% to  40% stenosis followed by mild luminal irregularities in the distal  portion of the LAD. 5.  LV gram was not performed due to renal dysfunction.     The patient tolerated the procedure well without any significant issues  or complications. Hemostasis was achieved with a Vasc Band device.     SUMMARY:  1. Elevated right heart pressures with elevated LVEPD. 2.  Patent coronaries.     RECOMMENDATIONS:  1.  IV diuresis. 2.  Monitor electrolytes. 3.  Optimize heart failure therapy. 4.  Weight loss advised. 5.  Optimize sleep apnea therapies. 6.  Follow up in clinic in one to two weeks to evaluate symptoms  further.           Tao Marcos MD     D: 12/06/2020 21:57:38       T: 12/06/2020 23:06:51     [email or web address]  Job#: [id number]     Doc#: [id number]     CC:    REVIEW OF SYSTEMS     Review of Systems   Constitutional: Positive for diaphoresis and fever. Negative for activity change, appetite change, fatigue and unexpected weight change. HENT: Negative for congestion, ear discharge, ear pain, hearing loss, rhinorrhea, sinus pressure, sore throat, trouble swallowing and voice change. Eyes: Negative for photophobia, pain, discharge, redness and itching. Respiratory: Positive for cough and shortness of breath. Negative for choking, chest tightness and wheezing. Cardiovascular: Positive for chest pain. Negative for palpitations and leg swelling. Gastrointestinal: Negative for abdominal distention, abdominal pain, blood in stool, constipation, diarrhea, nausea and vomiting. Endocrine: Negative for polydipsia, polyphagia and polyuria. Genitourinary: Negative for decreased urine volume, difficulty urinating, dysuria, enuresis, frequency, hematuria and urgency. Musculoskeletal: Negative for arthralgias, back pain, gait problem, myalgias, neck pain and neck stiffness. Skin: Negative for pallor and rash. Allergic/Immunologic: Negative for immunocompromised state. Neurological: Negative for dizziness, tremors, seizures, syncope, facial asymmetry, weakness, light-headedness, numbness and headaches. Hematological: Negative for adenopathy. Does not bruise/bleed easily. Psychiatric/Behavioral: Negative for agitation, confusion, decreased concentration, hallucinations and suicidal ideas. The patient is not nervous/anxious. PAST MEDICAL HISTORY    has a past medical history of Arthritis, Back problem, Constipation, Diabetes mellitus ([address] Hypertension, Sleep apnea, and Thyroid disease. SURGICAL HISTORY      has a past surgical history that includes Appendectomy; knee surgery (Right, 1980's); Carpal tunnel release (Bilateral); Colonoscopy (2017); hernia repair; pr exploratory of abdomen (N/A, 2/5/2018); Dilatation, esophagus; and Abdomen surgery. CURRENT MEDICATIONS       Previous Medications    ACETAMINOPHEN (TYLENOL) 500 MG TABLET    Take 1 tablet by mouth every 6 hours as needed for Pain    ALBUTEROL (PROVENTIL HFA;VENTOLIN HFA) 108 (90 BASE) MCG/ACT INHALER    Inhale 1 puff into the lungs every 6 hours as needed for Wheezing. AMITRIPTYLINE (ELAVIL) 10 MG TABLET    Take 1 tablet by mouth nightly    AMLODIPINE (NORVASC) 10 MG TABLET    Take 10 mg by mouth daily    ASPIRIN EC 81 MG EC TABLET    Take 1 tablet by mouth daily    AZELASTINE (ASTELIN) 137 MCG/SPRAY NASAL SPRAY    1 spray by Nasal route as needed. Use in each nostril as directed    BISMUTH SUBSALICYLATE (PEPTO BISMOL) 262 MG/15ML SUSPENSION    Take by mouth daily    CARVEDILOL (COREG) 25 MG TABLET    Take 1 tablet by mouth 2 times daily    CLONIDINE (CATAPRES) 0.2 MG TABLET    Take 0.2 mg by mouth 3 times daily    CYCLOBENZAPRINE (FLEXERIL) 10 MG TABLET    Take 10 mg by mouth 3 times daily as needed for Muscle spasms    FUROSEMIDE (LASIX) 40 MG TABLET    40 mg AM, 20 mg PM    GABAPENTIN (NEURONTIN) 300 MG CAPSULE    Take 300 mg by mouth nightly. GLIMEPIRIDE (AMARYL) 4 MG TABLET    Take 4 mg by mouth daily    HYDRALAZINE (APRESOLINE) 50 MG TABLET    Take 1.5 tablets by mouth every 8 hours    LEVOTHYROXINE (SYNTHROID) 175 MCG TABLET    Take 175 mcg by mouth Daily    LOSARTAN (COZAAR) 25 MG TABLET    Take 1 tablet by mouth daily    OXYBUTYNIN (DITROPAN XL) 10 MG EXTENDED RELEASE TABLET    Take 1 tablet by mouth daily    OXYCODONE-ACETAMINOPHEN (PERCOCET)  MG PER TABLET    Take 1 tablet by mouth every 8 hours as needed for Pain for up to 30 days. PANTOPRAZOLE (PROTONIX) 40 MG TABLET    Take 1 tablet by mouth every morning (before breakfast)    POTASSIUM CHLORIDE (MICRO-K) 10 MEQ EXTENDED RELEASE CAPSULE    Take 3 capsules by mouth daily    ROPINIROLE (REQUIP) 0.5 MG TABLET    Take 1 pill in afternoon and 2 pills at night. SPIRONOLACTONE (ALDACTONE) 50 MG TABLET    50 mg AM, 25 mg PM    TAMSULOSIN (FLOMAX) 0.4 MG CAPSULE    Take 1 capsule by mouth nightly    ZOLPIDEM (AMBIEN CR) 12.5 MG EXTENDED RELEASE TABLET    Take 12.5 mg by mouth nightly as needed. ALLERGIES     is allergic to shellfish-derived products; pcn [penicillins]; succinylcholine; morphine; and tape [adhesive tape]. FAMILY HISTORY     He indicated that his mother is . He indicated that his father is . He indicated that all of his four brothers are alive. He indicated that the status of his paternal grandmother is unknown. He indicated that the status of his neg hx is unknown.   family history includes Arthritis in his brother; Cancer in his father and mother; Diabetes in his paternal grandmother; Heart Attack in his brother; Heart Disease in his father; Prostate Cancer in his brother; Stroke in his brother. SOCIAL HISTORY      reports that he has never smoked. His smokeless tobacco use includes chew. He reports that he does not drink alcohol or use drugs.     PHYSICAL EXAM INITIAL VITALS:  height is 6' 1\" (1.854 m) and weight is 316 lb (143.3 kg) (abnormal). His temperature is 97.9 °F (36.6 °C). His blood pressure is 121/53 (abnormal) and his pulse is 70. His respiration is 20 and oxygen saturation is 97%. Physical Exam  Vitals signs and nursing note reviewed. Constitutional:       General: He is not in acute distress. Appearance: He is well-developed. He is not diaphoretic. HENT:      Head: Normocephalic and atraumatic. Right Ear: External ear normal.      Left Ear: External ear normal.      Nose: Nose normal.   Eyes:      General: Lids are normal. No scleral icterus. Right eye: No discharge. Left eye: No discharge. Conjunctiva/sclera: Conjunctivae normal.      Right eye: No exudate. Left eye: No exudate. Pupils: Pupils are equal, round, and reactive to light. Neck:      Musculoskeletal: Normal range of motion and neck supple. Normal range of motion. Thyroid: No thyromegaly. Vascular: No JVD. Trachea: No tracheal deviation. Cardiovascular:      Rate and Rhythm: Regular rhythm. Tachycardia present. Pulses: Normal pulses. Carotid pulses are 2+ on the right side and 2+ on the left side. Radial pulses are 2+ on the right side and 2+ on the left side. Femoral pulses are 2+ on the right side and 2+ on the left side. Popliteal pulses are 2+ on the right side and 2+ on the left side. Dorsalis pedis pulses are 2+ on the right side and 2+ on the left side. Posterior tibial pulses are 2+ on the right side and 2+ on the left side. Heart sounds: S1 normal and S2 normal. Murmur present. Systolic murmur present with a grade of 2/6. No friction rub. No gallop. Pulmonary:      Effort: Pulmonary effort is normal. No respiratory distress. Breath sounds: No stridor. Examination of the right-upper field reveals wheezing. Examination of the left-upper field reveals wheezing. Examination of the right-middle field reveals wheezing. Examination of the left-middle field reveals wheezing. Examination of the right-lower field reveals decreased breath sounds. Examination of the left-lower field reveals decreased breath sounds. Decreased breath sounds and wheezing present. No rhonchi or rales. Chest:      Chest wall: No tenderness. Abdominal:      General: Bowel sounds are normal. There is no distension. Palpations: Abdomen is soft. There is no mass. Tenderness: There is no abdominal tenderness. There is no guarding or rebound. Musculoskeletal: Normal range of motion. General: No tenderness. Right lower leg: No edema. Left lower leg: No edema. Lymphadenopathy:      Cervical: No cervical adenopathy. Skin:     General: Skin is warm and dry. Findings: No bruising, ecchymosis, lesion or rash. Neurological:      Mental Status: He is alert and oriented to person, place, and time. Cranial Nerves: No cranial nerve deficit. Sensory: No sensory deficit. Coordination: Coordination normal.      Deep Tendon Reflexes: Reflexes are normal and symmetric. Psychiatric:         Speech: Speech normal.         Behavior: Behavior normal.         Thought Content: Thought content normal.         Judgment: Judgment normal.           DIFFERENTIAL DIAGNOSIS:   Pneumonia bronchitis COPD CHF COVID-19 ACS    DIAGNOSTIC RESULTS     EKG: All EKG's are interpreted by the Emergency Department Physician who either signs or Co-signs this chart in the absence of a cardiologist.  EKG revealed normal sinus rhythm, normal axis, ventricular rate 88 WI interval 154 QRS duration 104 QT interval 376 QTC of 454.     RADIOLOGY: non-plain film images(s) such as CT, Ultrasound and MRI are read by the radiologist.  XR CHEST PORTABLE Final Result   Impression:   Progressive CHF.       This document has been electronically signed by: Meenakshi Thorne MD on    03/02/2021 03:06 AM      CTA CHEST W WO CONTRAST    (Results Pending)         LABS:   Labs Reviewed   CBC WITH AUTO DIFFERENTIAL - Abnormal; Notable for the following components:       Result Value    RBC 4.45 (*)     Hemoglobin 12.9 (*)     Hematocrit 39.7 (*)     RDW-CV 15.5 (*)     RDW-SD 49.7 (*)     MPV 9.2 (*)     Segs Absolute 8.9 (*)     Lymphocytes Absolute 0.6 (*)     All other components within normal limits   BRAIN NATRIURETIC PEPTIDE - Abnormal; Notable for the following components:    Pro-BNP 1279.0 (*)     All other components within normal limits   BASIC METABOLIC PANEL - Abnormal; Notable for the following components:    Glucose 169 (*)     All other components within normal limits   BLOOD GAS, ARTERIAL - Abnormal; Notable for the following components:    pH, Blood Gas 7.51 (*)     PCO2 31 (*)     PO2 144 (*)     All other components within normal limits   PROCALCITONIN - Abnormal; Notable for the following components:    Procalcitonin 0.10 (*)     All other components within normal limits   GLOMERULAR FILTRATION RATE, ESTIMATED - Abnormal; Notable for the following components:    Est, Glom Filt Rate 69 (*)     All other components within normal limits   RAPID INFLUENZA A/B ANTIGENS   COVID-19, RAPID   CULTURE, BLOOD 1   CULTURE, BLOOD 2   HEPATIC FUNCTION PANEL   LIPASE   TROPONIN   MAGNESIUM   LACTATE, SEPSIS   ANION GAP   OSMOLALITY   LACTATE, SEPSIS   D-DIMER, QUANTITATIVE       EMERGENCY DEPARTMENT COURSE:   Vitals:    Vitals:    03/02/21 0342 03/02/21 0444 03/02/21 0514 03/02/21 0553   BP: (!) 147/80 136/70 (!) 121/53    Pulse: 76 78 70    Resp: 20 20 20    Temp:    97.9 °F (36.6 °C)   TempSrc:       SpO2: 94% 94% 97%    Weight:       Height: Patient was assessed at bedside appropriate labs and imaging were ordered. Patient was given 2 DuoNeb treatments at bedside. Patient had relief of symptoms with the DuoNeb. I reviewed all labs and imaging went back to bedside reassessed the patient he is doing much better. Chest x-ray shows CHF. He was given 40 of Lasix. At this point I feel the patient needs to be admitted. I called the hospitalist service and discussed case with them. They graciously accepted the admission. Patient is admitted in stable condition pending further evaluation and treatment. CRITICAL CARE:   None    CONSULTS:  Hospitalist    PROCEDURES:  None    FINAL IMPRESSION      1. Acute on chronic systolic congestive heart failure ([facility] [address]    2. Pneumonia due to organism          DISPOSITION/PLAN   Admission    PATIENT REFERRED TO:  No follow-up provider specified.     DISCHARGE MEDICATIONS:  New Prescriptions    No medications on file       (Please note that portions of this note were completed with a voice recognition program.  Efforts were made to edit the dictations but occasionally words are mis-transcribed.)    Chris [address] [place] [address],   03/02/21 6297

## 2021-03-02 NOTE — ED NOTES
ED nurse-to-nurse bedside report    Chief Complaint   Patient presents with    Shortness of Breath      LOC: alert and orientated to name, place, date  Vital signs   Vitals:    03/02/21 0235 03/02/21 0238 03/02/21 0342 03/02/21 0444   BP:   (!) 147/80 136/70   Pulse: 84  76 78   Resp: 18  20 20   Temp:  101.2 °F (38.4 °C)     TempSrc:  Oral     SpO2: 99%  94% 94%   Weight:  (!) 316 lb (143.3 kg)     Height:  6' 1\" (1.854 m)        Pain:    Pain Interventions: tylenol  Pain Goal:   Oxygen: Yes    Current needs required 3 L NC   Telemetry: Yes  LDAs:   Peripheral IV 03/02/21 Right Hand (Active)   Site Assessment Clean;Dry; Intact 03/02/21 0445   Line Status Normal saline locked 03/02/21 0445   Dressing Status Clean;Dry; Intact 03/02/21 0445     Continuous Infusions:   Mobility: Requires assistance * 1  Brown Fall Risk Score: No flowsheet data found.   Fall Interventions: none  Report given to: Deion Boyd RN  03/02/21 8878

## 2021-03-02 NOTE — PROGRESS NOTES
Pt A&O x4. Vital signs obtained. Pupils equal, round and reactive to light. Lung sounds clear bilaterally. Respirations regular. Heart sounds regular rate and rhythm. Pt does have telemetry device on. Abdomen is soft and without tenderness or any masses. Bowels sounds active in all four quadrants. Skin warm, pink and dry in all extremities. Skin turgor and capillary refill <3 seconds. Sensation ad movement present in all extremities. Upper extremities +2 pulse bilaterally. INT present in right hand, capped. Dressing clean, dry and intact. Pedal pulses +2 bilaterally. No edema noted bilaterally. Pt call light within reach.

## 2021-03-02 NOTE — CARE COORDINATION
3/2/21, 12:50 PM EST  DISCHARGE PLANNING EVALUATION:    Clayton Mast       Admitted: 3/2/2021/ [facility] day: 0   Location: -24/024-A Reason for admit: Acute respiratory failure with hypoxia ([facility] [address] [J96.01]  Dyspnea [R06.00]   PMH:  has a past medical history of Arthritis, Back problem, Constipation, Diabetes mellitus ([facility] [address] Hypertension, Sleep apnea, and Thyroid disease. Barriers to Discharge:  Patient presents with shortness of breath. Lovenox, IV Lasix twice daily, DM management, IV Levaquin x 1 dose, Duoneb nebulizer x 2 doses, Nicotine patch, prn pain medications and antiemetics, BMP, CBC, Lipid panel, Magnesium and Troponin, low sodium diet, consults to Dietician and CHF RN, daily weights, orthostatic vitals, telemetry, SCD's. PCP: Lobito Antunez DO  Readmission Risk Score: 17%    Patient Goals/Plan/Treatment Preferences: Met with Zakiya Lee. He is from home with his wife and grand-daughters that they are raising. Zakiya Lee verifies his insurance and PCP. He is in need of a new Bi-pap machine. He had a sleep study but was not able to tolerate it. He is rescheduled for testing on the 8th of this month. [facility]. His wife will transport him home at discharge. Transportation/Food Security/Housekeeping Addressed:  No issues identified.

## 2021-03-02 NOTE — ED NOTES
Patient transported to Radiology department via [employer] tech in stable condition.        Freida Harada, RN  03/02/21 8100

## 2021-03-02 NOTE — PROGRESS NOTES
Pt lungs sounds clear and regular throughout. No signs of shortness of breath or trouble breathing. Respirations regular. Pt denies any breathing problems at the time.  Call light within reach

## 2021-03-02 NOTE — ED NOTES
Pt arrival to ED via EMS for shortness of breath which began a couple days ago. States that he came to the ED when he almost passed out an hour ago. States no chest pain, just shortness of breath. Diaphoretic. Vital signs assessed, intake assessment completed. Respiratory at bedside to administer breathing treatment, X-ray at bedside shortly after. Blood cultures and labs drawn and sent. Covid and flu swab sent. Given water and cold washcloth. Denies further needs at this time.       [employer]  03/02/21 0117

## 2021-03-02 NOTE — ED NOTES
Pt sitting on side of bed. Medicated per orders. Denies other needs. Updated on admission. Call light in reach.       Alka Loja RN  03/02/21 5020

## 2021-03-02 NOTE — H&P
64 y.o. male who presented to [facility] with shortness of breath; patient states approximately 3 days ago he developed shortness of breath which is progressively increased; he has a past medical history of hypertension, sleep apnea, diabetes and chronic back pain; in addition to his shortness of breath he relates to dizziness, denies a headache, relates to a nonproductive cough, denies chest pain, he relates to orthopnea as states he has had to sleep in his recliner the last few nights and also dyspnea on exertion, he relates to some nausea; he also relates to having a fever; he had a heart cath done on December 6, 2020 that revealed patent coronaries; echo from December 15, 2020 revealed an EF of 55 to 60%; in the emergency department he had a fever of 101.2, his sat was 99% on 3 L.     In the emergency department, he was found to have a BNP of 1279, elevated D-dimer of 1440 in which a CTA chest was completed that did not reveal a PE however questioning pneumonia versus heart failure, chest x-ray is showing heart failure; he was given Lasix 40 mg IV along with 2 DuoNeb treatments and was given Levaquin 500 mg x 1 dose at 441 today; is being admitted to the hospital service for further care and evaluation      Past Medical History:          Diagnosis Date    Arthritis     Back problem     Constipation     Diabetes mellitus ([facility] [address]     Hypertension     Sleep apnea     has CPAP    Thyroid disease        Past Surgical History:          Procedure Laterality Date    ABDOMEN SURGERY      APPENDECTOMY      CARPAL TUNNEL RELEASE Bilateral     COLONOSCOPY  2017    Dr. Jessy Savage, ESOPHAGUS     [address] [facility]      x3 surgeries with 14 repairs    KNEE SURGERY Right 1980's    cartilage    [place] EXPLORATORY OF ABDOMEN N/A 2/5/2018    ABDOMINAL EXPLORATION WITH LYSIS OF COMPLICATED ADHESIONS WITH AN EXTENDED RIGHT COLON RESECTION performed by Joy Peñaloza MD at [place] [facility] Medications Prior to Admission:      Prior to Admission medications    Medication Sig Start Date End Date Taking? Authorizing Provider   zolpidem (AMBIEN CR) 12.5 MG extended release tablet Take 12.5 mg by mouth nightly as needed. 1/12/21   Historical Provider, MD   gabapentin (NEURONTIN) 300 MG capsule Take 300 mg by mouth nightly. 1/12/21   Historical Provider, MD   amLODIPine (NORVASC) 10 MG tablet Take 10 mg by mouth daily 1/16/21   Historical Provider, MD   bismuth subsalicylate (PEPTO BISMOL) 262 MG/15ML suspension Take by mouth daily    Historical Provider, MD   glimepiride (AMARYL) 4 MG tablet Take 4 mg by mouth daily 1/16/21   Historical Provider, MD   potassium chloride (MICRO-K) 10 MEQ extended release capsule Take 3 capsules by mouth daily 1/16/21   Historical Provider, MD   losartan (COZAAR) 25 MG tablet Take 1 tablet by mouth daily 2/11/21   Manus Divers, APRN - CNP   spironolactone (ALDACTONE) 50 MG tablet 50 mg AM, 25 mg PM 2/4/21   Manus Divers, APRN - CNP   furosemide (LASIX) 40 MG tablet 40 mg AM, 20 mg PM 2/4/21   Manus Divers, APRN - CNP   oxyCODONE-acetaminophen (PERCOCET)  MG per tablet Take 1 tablet by mouth every 8 hours as needed for Pain for up to 30 days. 2/3/21 3/5/21  Yesenia Marley, APRN - CNP   amitriptyline (ELAVIL) 10 MG tablet Take 1 tablet by mouth nightly 11/30/20   Mavis Miller PA-C   hydrALAZINE (APRESOLINE) 50 MG tablet Take 1.5 tablets by mouth every 8 hours 11/19/20   Laisha Taveras MD   aspirin EC 81 MG EC tablet Take 1 tablet by mouth daily 11/19/20   Laisha Taveras MD   rOPINIRole (REQUIP) 0.5 MG tablet Take 1 pill in afternoon and 2 pills at night.  11/12/20   Mavis Miller PA-C   oxybutynin (DITROPAN XL) 10 MG extended release tablet Take 1 tablet by mouth daily 8/18/20   Ramón Pryor MD   carvedilol (COREG) 25 MG tablet Take 1 tablet by mouth 2 times daily 7/24/20   Rock Ventura MD tamsulosin (FLOMAX) 0.4 MG capsule Take 1 capsule by mouth nightly 7/2/19 2/11/21  SAHIL Willis CNP   acetaminophen (TYLENOL) 500 MG tablet Take 1 tablet by mouth every 6 hours as needed for Pain 4/13/19   Akbar Levy DO   cloNIDine (CATAPRES) 0.2 MG tablet Take 0.2 mg by mouth 3 times daily    Historical Provider, MD   levothyroxine (SYNTHROID) 175 MCG tablet Take 175 mcg by mouth Daily    Historical Provider, MD   pantoprazole (PROTONIX) 40 MG tablet Take 1 tablet by mouth every morning (before breakfast)  Patient taking differently: Take 40 mg by mouth as needed  9/10/17   Anabell Peres MD   cyclobenzaprine (FLEXERIL) 10 MG tablet Take 10 mg by mouth 3 times daily as needed for Muscle spasms    Historical Provider, MD   albuterol (PROVENTIL HFA;VENTOLIN HFA) 108 (90 BASE) MCG/ACT inhaler Inhale 1 puff into the lungs every 6 hours as needed for Wheezing. Historical Provider, MD   azelastine (ASTELIN) 137 MCG/SPRAY nasal spray 1 spray by Nasal route as needed. Use in each nostril as directed    Historical Provider, MD       Allergies:  Shellfish-derived products, Pcn [penicillins], Succinylcholine, Morphine, and Tape [adhesive tape]    Social History:   reports that he has never smoked. His smokeless tobacco use includes chew. He reports that he does not drink alcohol or use drugs.     Family History:      Positive as follows:        Problem Relation Age of Onset    Cancer Mother         breast    Heart Disease Father         bladder, lung    Cancer Father     Stroke Brother     Heart Attack Brother     Prostate Cancer Brother     Diabetes Paternal Grandmother     Arthritis Brother     High Blood Pressure Neg Hx        REVIEW OF SYSTEMS:     Constitutional: ROS: positive for  - chills and fever  Head: no headache, no head injury, no migraine   Eyes ROS: denies blurred/double vision  Ears ROS: no hearing difficulty, no tinnitus  Mouth and Throat ROS: no ulceration, dysphagia, dental caries Psychological ROS: no depression, no anxiety, no panic attacks, denies suicide/homicide ideation  Endocrine ROS: denies polyuria, polydypsia, no heat or cold intolerance  Respiratory ROS: positive for - cough and shortness of breath  Cardiovascular ROS: positive for - dyspnea on exertion, orthopnea, paroxysmal nocturnal dyspnea and shortness of breath  Gastrointestinal ROS: positive for - nausea  Genito-Urinary ROS: denies dysuria, frequency, urgency; denies hematuria  Musculoskeletal ROS: negative  Neurological ROS: no syncope, no seizures, no numbness or tingling of hands, no numbness or tingling of feet, no paresis  Dermatology: no skin rash, no eczema  Endocrine: no polyuria, polydypsia, no heat/cold intolerance  Hematology: denies bruising easily, denies bleeding problems, denies clotting disorders    PHYSICAL EXAM:    /79   Pulse 66   Temp 97.9 °F (36.6 °C)   Resp 22   Ht 6' 1\" (1.854 m)   Wt (!) 316 lb (143.3 kg)   SpO2 97%   BMI 41.69 kg/m²     General appearance:  No apparent distress, appears stated age and cooperative. HEENT:  Normal cephalic, atraumatic without obvious deformity. Pupils equal, round, and reactive to light. Conjunctivae/corneas clear. Neck: Supple, with full range of motion. No jugular venous distention. Trachea midline. Respiratory:  Normal respiratory effort. Clear to auscultation, bilaterally without Rales/Wheezes/Rhonchi. Cardiovascular:  Regular rate and rhythm with normal S1/S2 without murmurs, rubs or gallops. Abdomen: Soft, non-tender, non-distended with normal bowel sounds. Musculoskeletal:  No clubbing, cyanosis or edema bilaterally. Full range of motion without deformity. Skin: Skin color, texture, turgor normal.    Neurologic:  Neurovascularly intact without any focal sensory/motor deficits.  Cranial nerves: II-XII intact, grossly non-focal.  Psychiatric:  Alert and oriented, thought content appropriate  Capillary Refill: Brisk,< 3 seconds Peripheral Pulses: +2 palpable, equal bilaterally       Labs:     Recent Labs     03/02/21 0245   WBC 10.7   HGB 12.9*   HCT 39.7*        Recent Labs     03/02/21 0245      K 4.0      CO2 25   BUN 11   CREATININE 1.1   CALCIUM 8.8     Recent Labs     03/02/21 0245   AST 16   ALT 23   BILIDIR <0.2   BILITOT 0.7   ALKPHOS 67     No results for input(s): INR in the last 72 hours. Recent Labs     03/02/21 0245   TROPONINT < 0.010     Procalcitonin:  Recent Labs     03/02/21 0245   PROCAL 0.10*     Lactic Acid: No results for input(s): LACTA in the last 72 hours. Urinalysis:      Lab Results   Component Value Date    NITRU Negative 10/13/2020    WBCUA 15-25 02/07/2018    BACTERIA FEW 02/07/2018    RBCUA 3-5 02/07/2018    BLOODU Negative 10/13/2020    BLOODU NEGATIVE 07/02/2019    GLUCOSEU Negative 10/13/2020       Radiology:     Xr Chest Portable    Result Date: 3/2/2021  1 view chest x-ray. Comparison: None Findings: Heart is enlarged. There is pulmonary edema. No consolidation. Impression: Progressive CHF. This document has been electronically signed by: Anita Cushing, MD on 03/02/2021 03:06 AM      EKG:  I have reviewed the EKG with the following interpretation: Sinus rhythm heart rate 88      Thank you Lili Barnes DO for the opportunity to be involved in this patient's care.     Electronically signed by SAHIL Trevino CNP on 3/2/2021 at 6:41 AM

## 2021-03-02 NOTE — ED NOTES
ED to inpatient nurses report    Chief Complaint   Patient presents with    Shortness of Breath      Present to ED from home  LOC: alert and orientated to name, place, date  Vital signs   Vitals:    03/02/21 0444 03/02/21 0514 03/02/21 0553 03/02/21 0639   BP: 136/70 (!) 121/53  133/79   Pulse: 78 70  66   Resp: 20 20 22   Temp:   97.9 °F (36.6 °C)    TempSrc:       SpO2: 94% 97%  97%   Weight:       Height:          Oxygen Baseline 97%    Current needs required 3 nc Bipap/Cpap No  LDAs:   Peripheral IV 03/02/21 Right Hand (Active)   Site Assessment Clean;Dry; Intact 03/02/21 0445   Line Status Normal saline locked 03/02/21 0445   Dressing Status Clean;Dry; Intact 03/02/21 0445       Peripheral IV 03/02/21 Left Antecubital (Active)   Site Assessment Clean;Dry; Intact 03/02/21 0548   Line Status Normal saline locked 03/02/21 0548   Dressing Status Clean;Dry; Intact 03/02/21 0548     Mobility: Requires assistance * 1  Pending ED orders: none  Present condition: stable alert    Electronically signed by Vivek Roth RN on 3/2/2021 at 7:10 AM     Vivek Roth RN  03/02/21 9028

## 2021-03-02 NOTE — ED NOTES
Pt sitting at bedside at this time. Vitals reassessed, respirs regular. Medicated per provider order. Denies further needs, call light in reach.       [employer]  03/02/21 3921

## 2021-03-02 NOTE — Clinical Note
Patient Class: Inpatient [101]   REQUIRED: Diagnosis: Acute respiratory failure with hypoxia ([facility] [address] [386345]   Estimated Length of Stay: Estimated stay of more than 2 midnights   Telemetry Bed Required?: Yes

## 2021-03-03 VITALS
BODY MASS INDEX: 41.75 KG/M2 | SYSTOLIC BLOOD PRESSURE: 150 MMHG | TEMPERATURE: 97.6 F | RESPIRATION RATE: 16 BRPM | OXYGEN SATURATION: 94 % | HEIGHT: 73 IN | HEART RATE: 67 BPM | DIASTOLIC BLOOD PRESSURE: 78 MMHG | WEIGHT: 315 LBS

## 2021-03-03 LAB
ANION GAP SERPL CALCULATED.3IONS-SCNC: 9 MEQ/L (ref 8–16)
BUN BLDV-MCNC: 12 MG/DL (ref 7–22)
CALCIUM SERPL-MCNC: 8.9 MG/DL (ref 8.5–10.5)
CHLORIDE BLD-SCNC: 104 MEQ/L (ref 98–111)
CHOLESTEROL, TOTAL: 129 MG/DL (ref 100–199)
CO2: 30 MEQ/L (ref 23–33)
CREAT SERPL-MCNC: 1.2 MG/DL (ref 0.4–1.2)
ERYTHROCYTE [DISTWIDTH] IN BLOOD BY AUTOMATED COUNT: 15.3 % (ref 11.5–14.5)
ERYTHROCYTE [DISTWIDTH] IN BLOOD BY AUTOMATED COUNT: 49.6 FL (ref 35–45)
GFR SERPL CREATININE-BSD FRML MDRD: 62 ML/MIN/1.73M2
GLUCOSE BLD-MCNC: 102 MG/DL (ref 70–108)
GLUCOSE BLD-MCNC: 164 MG/DL (ref 70–108)
GLUCOSE BLD-MCNC: 99 MG/DL (ref 70–108)
HCT VFR BLD CALC: 39.9 % (ref 42–52)
HDLC SERPL-MCNC: 27 MG/DL
HEMOGLOBIN: 12.9 GM/DL (ref 14–18)
LDL CHOLESTEROL CALCULATED: 77 MG/DL
MAGNESIUM: 2.1 MG/DL (ref 1.6–2.4)
MCH RBC QN AUTO: 29.3 PG (ref 26–33)
MCHC RBC AUTO-ENTMCNC: 32.3 GM/DL (ref 32.2–35.5)
MCV RBC AUTO: 90.5 FL (ref 80–94)
PLATELET # BLD: 270 THOU/MM3 (ref 130–400)
PMV BLD AUTO: 9.4 FL (ref 9.4–12.4)
POTASSIUM SERPL-SCNC: 3.7 MEQ/L (ref 3.5–5.2)
RBC # BLD: 4.41 MILL/MM3 (ref 4.7–6.1)
SARS-COV-2, NAAT: NOT DETECTED
SODIUM BLD-SCNC: 143 MEQ/L (ref 135–145)
T4 FREE: 1.18 NG/DL (ref 0.93–1.76)
TRIGL SERPL-MCNC: 127 MG/DL (ref 0–199)
TSH SERPL DL<=0.05 MIU/L-ACNC: 9.86 UIU/ML (ref 0.4–4.2)
WBC # BLD: 7.3 THOU/MM3 (ref 4.8–10.8)

## 2021-03-03 PROCEDURE — 6370000000 HC RX 637 (ALT 250 FOR IP): Performed by: NURSE PRACTITIONER

## 2021-03-03 PROCEDURE — 6370000000 HC RX 637 (ALT 250 FOR IP): Performed by: INTERNAL MEDICINE

## 2021-03-03 PROCEDURE — 82948 REAGENT STRIP/BLOOD GLUCOSE: CPT

## 2021-03-03 PROCEDURE — 94660 CPAP INITIATION&MGMT: CPT

## 2021-03-03 PROCEDURE — 2580000003 HC RX 258: Performed by: NURSE PRACTITIONER

## 2021-03-03 PROCEDURE — 80048 BASIC METABOLIC PNL TOTAL CA: CPT

## 2021-03-03 PROCEDURE — 85027 COMPLETE CBC AUTOMATED: CPT

## 2021-03-03 PROCEDURE — 84443 ASSAY THYROID STIM HORMONE: CPT

## 2021-03-03 PROCEDURE — 80061 LIPID PANEL: CPT

## 2021-03-03 PROCEDURE — 84439 ASSAY OF FREE THYROXINE: CPT

## 2021-03-03 PROCEDURE — 83735 ASSAY OF MAGNESIUM: CPT

## 2021-03-03 PROCEDURE — 99239 HOSP IP/OBS DSCHRG MGMT >30: CPT | Performed by: HOSPITALIST

## 2021-03-03 PROCEDURE — 2500000003 HC RX 250 WO HCPCS: Performed by: INTERNAL MEDICINE

## 2021-03-03 PROCEDURE — 94761 N-INVAS EAR/PLS OXIMETRY MLT: CPT

## 2021-03-03 PROCEDURE — 6370000000 HC RX 637 (ALT 250 FOR IP): Performed by: HOSPITALIST

## 2021-03-03 PROCEDURE — 6360000002 HC RX W HCPCS: Performed by: NURSE PRACTITIONER

## 2021-03-03 PROCEDURE — 87635 SARS-COV-2 COVID-19 AMP PRB: CPT

## 2021-03-03 PROCEDURE — 36415 COLL VENOUS BLD VENIPUNCTURE: CPT

## 2021-03-03 RX ORDER — BUMETANIDE 1 MG/1
2 TABLET ORAL DAILY
Status: DISCONTINUED | OUTPATIENT
Start: 2021-03-03 | End: 2021-03-03 | Stop reason: HOSPADM

## 2021-03-03 RX ORDER — CLONIDINE HYDROCHLORIDE 0.1 MG/1
0.1 TABLET ORAL 3 TIMES DAILY
Qty: 90 TABLET | Refills: 0 | Status: SHIPPED | OUTPATIENT
Start: 2021-03-03 | End: 2021-04-13 | Stop reason: ALTCHOICE

## 2021-03-03 RX ORDER — BUMETANIDE 2 MG/1
2 TABLET ORAL DAILY
Qty: 30 TABLET | Refills: 0 | Status: SHIPPED | OUTPATIENT
Start: 2021-03-04 | End: 2021-03-15 | Stop reason: SDUPTHER

## 2021-03-03 RX ORDER — POTASSIUM CHLORIDE 750 MG/1
20 CAPSULE, EXTENDED RELEASE ORAL DAILY
Qty: 60 CAPSULE | Refills: 0 | Status: SHIPPED | OUTPATIENT
Start: 2021-03-03 | End: 2021-09-09 | Stop reason: ALTCHOICE

## 2021-03-03 RX ORDER — LEVOTHYROXINE SODIUM 0.2 MG/1
175 TABLET ORAL DAILY
Qty: 30 TABLET | Refills: 1 | Status: ON HOLD | OUTPATIENT
Start: 2021-03-03 | End: 2022-03-30 | Stop reason: HOSPADM

## 2021-03-03 RX ORDER — HYDRALAZINE HYDROCHLORIDE 50 MG/1
50 TABLET, FILM COATED ORAL EVERY 8 HOURS SCHEDULED
Qty: 90 TABLET | Refills: 0 | Status: SHIPPED | OUTPATIENT
Start: 2021-03-03 | End: 2021-04-13

## 2021-03-03 RX ADMIN — CLONIDINE HYDROCHLORIDE 0.1 MG: 0.2 TABLET ORAL at 15:24

## 2021-03-03 RX ADMIN — ENOXAPARIN SODIUM 40 MG: 40 INJECTION SUBCUTANEOUS at 09:48

## 2021-03-03 RX ADMIN — LEVOTHYROXINE SODIUM 175 MCG: 0.03 TABLET ORAL at 05:43

## 2021-03-03 RX ADMIN — AMLODIPINE BESYLATE 5 MG: 5 TABLET ORAL at 09:49

## 2021-03-03 RX ADMIN — POTASSIUM CHLORIDE 30 MEQ: 750 TABLET, EXTENDED RELEASE ORAL at 09:48

## 2021-03-03 RX ADMIN — ASPIRIN 81 MG: 81 TABLET, COATED ORAL at 09:49

## 2021-03-03 RX ADMIN — BUMETANIDE 2 MG: 1 TABLET ORAL at 10:39

## 2021-03-03 RX ADMIN — BUMETANIDE 2 MG: 0.25 INJECTION, SOLUTION INTRAMUSCULAR; INTRAVENOUS at 04:11

## 2021-03-03 RX ADMIN — INSULIN LISPRO 2 UNITS: 100 INJECTION, SOLUTION INTRAVENOUS; SUBCUTANEOUS at 13:12

## 2021-03-03 RX ADMIN — OXYBUTYNIN CHLORIDE 10 MG: 10 TABLET, EXTENDED RELEASE ORAL at 09:49

## 2021-03-03 RX ADMIN — PANTOPRAZOLE SODIUM 40 MG: 40 TABLET, DELAYED RELEASE ORAL at 05:44

## 2021-03-03 RX ADMIN — GLIMEPIRIDE 4 MG: 4 TABLET ORAL at 09:49

## 2021-03-03 RX ADMIN — HYDRALAZINE HYDROCHLORIDE 50 MG: 50 TABLET, FILM COATED ORAL at 05:43

## 2021-03-03 RX ADMIN — SPIRONOLACTONE 50 MG: 25 TABLET ORAL at 09:48

## 2021-03-03 RX ADMIN — LOSARTAN POTASSIUM 25 MG: 25 TABLET, FILM COATED ORAL at 09:49

## 2021-03-03 RX ADMIN — CARVEDILOL 25 MG: 25 TABLET, FILM COATED ORAL at 09:49

## 2021-03-03 RX ADMIN — SODIUM CHLORIDE, PRESERVATIVE FREE 10 ML: 5 INJECTION INTRAVENOUS at 09:54

## 2021-03-03 RX ADMIN — OXYCODONE HYDROCHLORIDE AND ACETAMINOPHEN 1 TABLET: 10; 325 TABLET ORAL at 05:26

## 2021-03-03 RX ADMIN — HYDRALAZINE HYDROCHLORIDE 50 MG: 50 TABLET, FILM COATED ORAL at 15:24

## 2021-03-03 RX ADMIN — ROPINIROLE HYDROCHLORIDE 0.5 MG: 0.5 TABLET, FILM COATED ORAL at 15:24

## 2021-03-03 RX ADMIN — CLONIDINE HYDROCHLORIDE 0.1 MG: 0.2 TABLET ORAL at 09:49

## 2021-03-03 RX ADMIN — ROPINIROLE HYDROCHLORIDE 0.5 MG: 0.5 TABLET, FILM COATED ORAL at 09:49

## 2021-03-03 ASSESSMENT — PAIN DESCRIPTION - LOCATION
LOCATION: BACK

## 2021-03-03 ASSESSMENT — PAIN SCALES - GENERAL
PAINLEVEL_OUTOF10: 7
PAINLEVEL_OUTOF10: 6
PAINLEVEL_OUTOF10: 7

## 2021-03-03 ASSESSMENT — PAIN DESCRIPTION - PAIN TYPE
TYPE: CHRONIC PAIN
TYPE: CHRONIC PAIN

## 2021-03-03 ASSESSMENT — PAIN DESCRIPTION - FREQUENCY: FREQUENCY: CONTINUOUS

## 2021-03-03 NOTE — DISCHARGE INSTR - DIET
? Good nutrition is important when healing from an illness, injury, or surgery. Follow any nutrition recommendations given to you during your hospital stay. ? If you were given an oral nutrition supplement while in the hospital, continue to take this supplement at home. You can take it with meals, in-between meals, and/or before bedtime. These supplements can be purchased at most local grocery stores, pharmacies, and chain [employer]-stores. ? If you have any questions about your diet or nutrition, call the hospital and ask for the dietitian.     Low sodium diet 2GM

## 2021-03-03 NOTE — PROGRESS NOTES
Patient wore CPAP through the night, but this morning was very frustrated with alarms. Patient stated he didn't want it anymore. He would just wear oxygen, because he \"couldn't stand it\". Explained to patient that the oxygen was not the same as the CPAP. Patient verbalized understanding, and stated he would ask his wife to bring in home CPAP. Patient wanted to go back to sleep and preferred 1L O2 via NC on, stated this worked for him in the past. SpO2 90%,  Applied 1L NC at this time.    Electronically signed by Malcolm Badillo RN on 3/3/2021 at 6:49 AM

## 2021-03-03 NOTE — CARE COORDINATION
Patient is discharged to home with no services added/requested. Hospital follow-up with PCP in place. 3/3/21, 12:34 PM EST    Patient goals/plan/ treatment preferences discussed by  and . Patient goals/plan/ treatment preferences reviewed with patient/ family. Patient/ family verbalize understanding of discharge plan and are in agreement with goal/plan/treatment preferences. Understanding was demonstrated using the teach back method. AVS provided by RN at time of discharge, which includes all necessary medical information pertaining to the patients current course of illness, treatment, post-discharge goals of care, and treatment preferences.         IMM Letter  IMM Letter given to Patient/Family/Significant other/Guardian/POA/by[de-identified] staff  IMM Letter date given[de-identified] 03/02/21  IMM Letter time given[de-identified] 3441

## 2021-03-03 NOTE — PLAN OF CARE
Problem: Pain:  Goal: Control of chronic pain  Description: Control of chronic pain  3/3/2021 0103 by Dominguez Golden RN  Outcome: Ongoing   Patient rates pain 8/10 in back. PRN percocet and flexeril given. Non pharmaceutical pain management includes rest and reposition. Problem: Falls - Risk of:  Goal: Will remain free from falls  Description: Will remain free from falls  3/3/2021 0103 by Dominguez Golden RN  Outcome: Ongoing   Fall assessment complete. Fall precautions in place. Bed in lowest position, bed alarm on, call light within reach. Problem: Discharge Planning:  Goal: Discharged to appropriate level of care  Description: Discharged to appropriate level of care  Outcome: Ongoing   Patient from home. Plan to go home when medically stable. Problem: Fluid Volume - Imbalance:  Goal: Absence of imbalanced fluid volume signs and symptoms  Description: Absence of imbalanced fluid volume signs and symptoms  Outcome: Ongoing   Patient has 3+ pitting edema in lower extremities. Patient takes Lasix at home, currently on IV Bumex while in the hospital. Lung sounds clear. No fluid restriction at this time.      Electronically signed by Javier Meeks RN on 3/3/2021 at 1:22 AM

## 2021-03-03 NOTE — DISCHARGE SUMMARY
Hospital Medicine Discharge Summary      Patient Identification:   Ana Gill   : 1960  MRN: [id number]   Account: [de-identified]      Patient's PCP: Rhys Rosa DO    Admit Date: 3/2/2021     Discharge Date:   3/3/2021      Admitting Physician: Haylee Mejia DO     Discharge Physician: Cris Clarke MD     Discharge Diagnoses: Active Hospital Problems    Diagnosis Date Noted    Acute respiratory failure with hypoxia ([address] [J96.01] 2021    Dyspnea [R06.00] 2021       The patient was seen and examined on day of discharge and this discharge summary is in conjunction with any daily progress note from day of discharge. Hospital Course:   Ana Gill is a 64 y.o. male admitted to [address] [place] [address] on 3/2/2021 for management of AKINS. I took over care on 3/3/2021  Pt responded well to medical management, remained clinically stable and was discharged in stable condition. .        Assessment/Plan:    1. AKINS:  likely secondary to acute on chronic decompensated HfpEF d/t poor compliance w/ medical management; improved w/ diuresis; stepped down to PO bmex 2 mg QD. I/O, Continue rest of medical management; daily wt, salt (2 gr) and fluid (2L) restriction; counseled in details re: dynamic nature of diuretic management; CHF clinic referral already arranged. TSH if anything elevated c/w hypothyroidism; early Cardio F/U w/ consideration of F/U OP TTE. Finally repeat Bmp ordered to monitor serum Cr in lieu of aggressive diuresis  2. Acute febrile illness: Tmax 101.2 in ED. COVID-19 negative, influenza screen negative; repeat COVID sent. procal NL, no need for Abx at this time  3.  Acute on chronic diastolic heart failure--EF 55 to 60% per echo dated December 15, 2020; CXR c/w increased vascular markings, BNP at 1279; 4. Hx of hypothyroidism: on Levothyroxine; repeat TSH elevated at 9.8; increased dose to 200 from 175 meq QD. PCP to repeat TSH in 6 wks and adjust dose accordingly  5. CKD stage III: stable at baseline. Repeat BMP in 3 days; aside from diuretics,pt on ARB/Spironolactone; repeat BMP in 2 days   6. Hx of HTN: fairly controlled on current in-hospital regimen; CCM at discharge; Pt and PCP need to monitor BP with antihypertensive regimen adjustment as needed  7. DM II: fairly controlled; BS well-controlled. Resumed home meds. Pt and PCP to monitor BS and adjust regimen accordingly. Last A1 C from April 2019 at 6.7%. PCP to repeat  8. Severe obstructive sleep apnea: on CPAP  9. Second-hand smoker: gives hx of chronic light-colored sputum; awaiting OP PFT to assess for COPD   10. Morbid obesity with BMI 41.69: counseling offered      Exam:     Vitals:  Vitals:    03/03/21 0545 03/03/21 0605 03/03/21 0800 03/03/21 0857   BP:   (!) 161/81 139/78   Pulse:   68 71   Resp:   18 18   Temp:   97.9 °F (36.6 °C)    TempSrc:   Oral    SpO2:  90% 92% 93%   Weight: (!) 357 lb 6.4 oz (162.1 kg)      Height:         Weight: Weight: (!) 357 lb 6.4 oz (162.1 kg)     24 hour intake/output:    Intake/Output Summary (Last 24 hours) at 3/3/2021 1128  Last data filed at 3/3/2021 1055  Gross per 24 hour   Intake 700 ml   Output 3735 ml   Net -3035 ml           General appearance: Obese, A&O x3, Not ill or toxic, in no apparent distress  HEENT:  MICHAEL  EOM intact. Neck: Supple, with full range of motion. No jugular venous distention. Trachea midline.   Respiratory:   NL A/E bilat with no adventitious sounds   Cardiovascular:  normal S1/S2 with no murmurs/gallops  Abdomen: Soft, non-tender, non-distended, no rigidity or peritoneal signs  Musculoskeletal: NL symmetrical A/PROM bilat U/L extremities   Skin: 2+ bilat pitting edema up to shin Neurologic:  CN II-XII intact. NL symmetrical reflexes. NL gait and stance. NL Cerebellar exam. Power 5/5 all muscle groups U/L extremities. Toes downgoing  Capillary Refill: Brisk,< 3 seconds   Peripheral Pulses: +2 palpable, equal bilaterally              Labs: For convenience and continuity at follow-up the following most recent labs are provided:      CBC:    Lab Results   Component Value Date    WBC 7.3 03/03/2021    HGB 12.9 03/03/2021    HCT 39.9 03/03/2021     03/03/2021       Renal:    Lab Results   Component Value Date     03/03/2021    K 3.7 03/03/2021    K 2.9 12/01/2020     03/03/2021    CO2 30 03/03/2021    BUN 12 03/03/2021    CREATININE 1.2 03/03/2021    CALCIUM 8.9 03/03/2021         Significant Diagnostic Studies    Radiology:   XR CHEST (2 VW)   Final Result   1. Mild stable cardiomegaly with pulmonary vascular congestion suspicious for congestive heart failure. 2. Probable pulmonary edema. **This report has been created using voice recognition software. It may contain minor errors which are inherent in voice recognition technology. **      Final report electronically signed by Dr. Thom Melendez on 3/2/2021 11:41 AM      CTA CHEST [address]   Final Result       1. No pulmonary emboli. 2. Mild mediastinal adenopathy. This was also present in 2017 but has slightly worsened. 3. Bilateral gynecomastia. 4. Coronary disease. 5. Bilateral patchy pulmonary opacities with ground glass attenuation. This can represent pulmonary edema. Pneumonia is also a consideration. **This report has been created using voice recognition software. It may contain minor errors which are inherent in voice recognition technology. **      Final report electronically signed by Dr. Cathy Monsalve on 3/2/2021 7:58 AM      XR CHEST PORTABLE   Final Result   Impression:   Progressive CHF.       This document has been electronically signed by: Blanca Hopkins MD on glimepiride (AMARYL) 4 MG tablet  Take 4 mg by mouth daily             hydrALAZINE (APRESOLINE) 50 MG tablet  Take 1 tablet by mouth every 8 hours             levothyroxine (SYNTHROID) 175 MCG tablet  Take 175 mcg by mouth Daily             losartan (COZAAR) 25 MG tablet  Take 1 tablet by mouth daily             oxybutynin (DITROPAN XL) 10 MG extended release tablet  Take 1 tablet by mouth daily             oxyCODONE-acetaminophen (PERCOCET)  MG per tablet  Take 1 tablet by mouth every 8 hours as needed for Pain for up to 30 days. pantoprazole (PROTONIX) 40 MG tablet  Take 1 tablet by mouth every morning (before breakfast)             potassium chloride (MICRO-K) 10 MEQ extended release capsule  Take 2 capsules by mouth daily             rOPINIRole (REQUIP) 0.5 MG tablet  Take 1 pill in afternoon and 2 pills at night. spironolactone (ALDACTONE) 50 MG tablet  50 mg AM, 25 mg PM             tamsulosin (FLOMAX) 0.4 MG capsule  Take 1 capsule by mouth nightly             zolpidem (AMBIEN CR) 12.5 MG extended release tablet  Take 12.5 mg by mouth nightly as needed. Time Spent on discharge is more than 45 minutes in the examination, evaluation, counseling and review of medications and discharge plan. With RN in room, patient and wife were updated about the treatment plan, all the questions and concerns were addressed. Alarming signs and symptoms to return to ED were explained in length. Signed: Thank you Rosemary Solano DO for the opportunity to be involved in this patient's care.     Electronically signed by Baylee Trinh MD on 3/3/2021 at 11:28 AM

## 2021-03-03 NOTE — PROGRESS NOTES
Nutrition Education  Received c/s - Heart Failure diet guidelines. Pt. Reports has been trying to follow mediterranean diet pta and tries not to add salt to his meals. · Verbally reviewed information with Patient and Family  · Educated on low sodium diet, FR and label reading. · Written educational materials provided. · Contact name and number provided. · Refer to Patient Education activity for more details.     Electronically signed by Cindi Musa RD, LD on 3/3/21 at 12:46 PM EST    Contact: [phone]

## 2021-03-03 NOTE — PROGRESS NOTES
Patient weighs 357.4 lbs this morning on standing scale. Yesterday patient weighed 316 according to chart. Patient states he weighed 355 last week in the doctors office.

## 2021-03-03 NOTE — PROGRESS NOTES
Patient admitted to [phone] by wheelchair from ED. IV noted in right hand. Vital signs obtained. Assessment complete. Oriented to room. All questions answered with no further questions at this time. Oriented to room. Policies and procedures for [facility] explained. A fall prevention and safety brochure discussed with patient. Bed alarm on. Call light in reach.

## 2021-03-04 ENCOUNTER — PATIENT MESSAGE (OUTPATIENT)
Dept: PHYSICAL MEDICINE AND REHAB | Age: 61
End: 2021-03-04

## 2021-03-04 DIAGNOSIS — G89.4 CHRONIC PAIN SYNDROME: ICD-10-CM

## 2021-03-04 RX ORDER — OXYCODONE AND ACETAMINOPHEN 10; 325 MG/1; MG/1
1 TABLET ORAL EVERY 8 HOURS PRN
Qty: 90 TABLET | Refills: 0 | Status: SHIPPED | OUTPATIENT
Start: 2021-03-05 | End: 2021-03-09 | Stop reason: SDUPTHER

## 2021-03-04 NOTE — TELEPHONE ENCOUNTER
OARRS reviewed. UDS: + for gabapentin, flexeril, ambien - consistent. Last dose of percocet days prior to screen     Last seen: 1/18/2021.      Follow-up: 03/22/2021

## 2021-03-05 NOTE — PROGRESS NOTES
CLINICAL PHARMACY NOTE: MEDS TO [address] Select Patient?: No  Total # of Prescriptions Filled: 3   The following medications were delivered to the patient:  Hydralazine 25mg  Bumetanide 2mg  Clonidine 0.1mg  Total # of Interventions Completed: 2  Time Spent (min): 30    Additional Documentation:

## 2021-03-07 LAB
BLOOD CULTURE, ROUTINE: NORMAL
BLOOD CULTURE, ROUTINE: NORMAL

## 2021-03-08 ENCOUNTER — HOSPITAL ENCOUNTER (OUTPATIENT)
Dept: SLEEP CENTER | Age: 61
Discharge: HOME | End: 2021-03-10
Payer: MEDICARE

## 2021-03-08 DIAGNOSIS — G47.30 SLEEP APNEA, UNSPECIFIED TYPE: ICD-10-CM

## 2021-03-08 DIAGNOSIS — I10 ESSENTIAL HYPERTENSION: ICD-10-CM

## 2021-03-08 DIAGNOSIS — G47.33 OSA (OBSTRUCTIVE SLEEP APNEA): ICD-10-CM

## 2021-03-08 PROCEDURE — 95811 POLYSOM 6/>YRS CPAP 4/> PARM: CPT

## 2021-03-09 DIAGNOSIS — G47.33 OSA TREATED WITH BIPAP: Primary | ICD-10-CM

## 2021-03-09 LAB — STATUS: NORMAL

## 2021-03-09 RX ORDER — OXYCODONE AND ACETAMINOPHEN 10; 325 MG/1; MG/1
1 TABLET ORAL EVERY 8 HOURS PRN
Qty: 90 TABLET | Refills: 0 | Status: SHIPPED | OUTPATIENT
Start: 2021-03-09 | End: 2021-04-08 | Stop reason: SDUPTHER

## 2021-03-09 NOTE — TELEPHONE ENCOUNTER
Patient called to check on this request. He was advised that it was sent to [facility] in [place] on 03/04/2021. He states that he does not use [facility] and wants this sent to [facility] in [place] instead.   Please advise

## 2021-03-09 NOTE — PROGRESS NOTES
Title:  CPAP/BiLevel Titration's    Approved by:  Jackson Blackman MD      Approval Date:  December, 2019 Next Review:  December, 2021     Responsible Party:  [facility] Applies to:   Heather Wiley Number:  None    Document Type:  Such as Guideline, Policy, Policy & Procedure, or Procedure, Instructions  Manual:  Policy and Procedures    Section: IV Policy Start Date: October, 9319           POLICY: Positive airway pressure device is used to treat patients with sleep related breathing disorders characterized by full or partial occlusion of the upper airway during sleep. A patient must have undergone polysomnography and diagnosed with obstructive sleep apnea. All individuals who record sleep studies must follow best practices for CPAP/bilevel/ASV titrations in order to attain the ideal pressure setting for their patients. Too low of pressure may cause patients to either be sub-optimally treated or to wake up in a panic. Too much pressure may cause the patient to experience bloating or mask leakage. Determining the appropriate pressure setting for each patient will lead to improved adherence and outcome. Titrations are not an exact science, and it is understood that technologists may need to make minor changes for individual patients. The procedures outlined below are meant to be a guideline and follow the spirit of the AASM clinical guidelines. PROCEDURE:    CPAP:    1. Review the patients pertinent medical al history, previous sleep study or studies to ass the severity of sleep disordered breathing. Review of pertinent information will help to attain a better titration. 2. Applications of electrodes, montages, filters, sensitivities and scoring will be performed according to the current version of the AASM Scoring Manual.   3. Prior to initiating study collect all appropriate PAP supplies  a. Tubing   b.  Humidifier (filled with distilled water)  c. Masks   4. The technologist should assess and measure patient for most appropriate mask prior to start of study. 5. CPAP should be initiated at 5 cm H20.  a. More pressure at start of study may be necessary if patient is morbidly obese or unable to fall asleep on a pressure of 5 cm H20   6. If apneas or frequent hypopneas are present, pressure settings should be increased by 2 cm H20. If occasional hypopneas, snoring, or mask flow limitation are present, pressure settings should be increased by 1 cm H20 and maintained for at least fie minutes to determine if events improve or resolve. Pressure settings may need to be increased more quickly during REM sleep given the limited amount of REM during sleep and the need to treat events during this stage. 7. If a mask leak occurs, the tech should first fix the leakage before raising the pressure. Otherwise, the final pressure setting chosen for the patient may be too high. Once the mask leak has been fixed, decrease the pressure to the last setting where mouth breathing and/or mask leakage was not present, and then re-titrate as indicated. Make sure to document directly on the study the steps taken to resolve the leak and the type of masks used. Pressure setting usually do not need to be set as high with a nasal-mask than with a full-face mask. 8. The recording technologist should document directly on the study at least every 30 minutes. 9. If the patient takes a break from wearing the mask, do not decrease the CPAP pressure on attempted return to sleep unless the patient remains awake for 15 minutes or the patient specifically requests that the pressure be lowered. 10. Do not raise pressure for central apneas. If the patient develops central apneas, pressure setting may need to be lowered. 11. If the patient is unable to tolerate CPAP secondary due to:  a. Persistent mouth breathing despite use of a full-face mask/chin strap  b.  Inability have neuromuscular diseases. 8. If apneas or frequent hypopneas are present, inspiratory and expiratory pressure settings should be increased by 2 cm H20. If occasional hypopneas, snoring, or mask flow limitation are present inspiratory and expiratory pressures settings should be increased by 1 cm h20 and maintained for at least 5 minutes to determine if events improve or resolve. Pressure settings may need to be increased more quickly during REM sleep given the limited amount of REM during sleep and the need to treat events during this stage. 9. If a mask leak occurs, the tech should first fix the leakage before raising the pressure. Otherwise, the final pressure setting chosen for the patient may be too high. Once the mask leak has been fixed, decrease the pressure to the last setting where mouth breathing and/or mask leakage was not present, and then re-titrate as indicated. Make sure to document directly on the study the steps taken to resolve the leak and the type of masks used. Pressure setting usually do not need to be set as high with a nasal-mask than with a full-face mask. 10. The recording technologist should document directly on the study at least every 30 minutes. 11. If the patient takes a break from wearing the mask, do not decrease the CPAP pressure on attempted return to sleep unless the patient remains awake for 15 minutes or the patient specifically requests that the pressure be lowered. 12. Do not raise pressure for central apneas. If the patient develops central apneas, pressure setting may need to be lowered. If the patient has central apneas on bilevel, the use of spontaneous (ST) mode may be indicated. a. ST mode may only be used in 2 cases  i. An order for ST mode with a primary diagnosis of central sleep apnea  ii. During a titration if obstructive events are less than 5/ hour and centrals must be greater than 50% of total respiratory events.    13. Ensure that supine sleep has been seen on the chosen setting. Going above the chosen setting 1 or 2 cm H20 to show range may be helpful to ensure that the correct pressure has been established.

## 2021-03-09 NOTE — TELEPHONE ENCOUNTER
Anjelica Dyson called requesting a refill on the following medications:  Requested Prescriptions     Pending Prescriptions Disp Refills    oxyCODONE-acetaminophen (PERCOCET)  MG per tablet 90 tablet 0     Sig: Take 1 tablet by mouth every 8 hours as needed for Pain for up to 30 days. Signed Prescriptions Disp Refills    oxyCODONE-acetaminophen (PERCOCET)  MG per tablet 90 tablet 0     Sig: Take 1 tablet by mouth every 8 hours as needed for Pain for up to 30 days.      Authorizing Provider: Julián Phillips [address] [place] [address] verified:  .[email or web address]  [facility] in [place], [place]    Date of last visit: 11/24/2020  Date of next visit (if applicable): 9/43/9909

## 2021-03-10 ENCOUNTER — HOSPITAL ENCOUNTER (OUTPATIENT)
Age: 61
Setting detail: SPECIMEN
Discharge: HOME | End: 2021-03-10
Payer: MEDICARE

## 2021-03-10 ENCOUNTER — TELEPHONE (OUTPATIENT)
Dept: SLEEP CENTER | Age: 61
End: 2021-03-10

## 2021-03-10 PROCEDURE — U0003 INFECTIOUS AGENT DETECTION BY NUCLEIC ACID (DNA OR RNA); SEVERE ACUTE RESPIRATORY SYNDROME CORONAVIRUS 2 (SARS-COV-2) (CORONAVIRUS DISEASE [COVID-19]), AMPLIFIED PROBE TECHNIQUE, MAKING USE OF HIGH THROUGHPUT TECHNOLOGIES AS DESCRIBED BY CMS-2020-01-R: HCPCS

## 2021-03-10 NOTE — PROGRESS NOTES
[address] [place] [address]                               SLEEP STUDY REPORT    PATIENT NAME: Anupama Fuentes                   :        1960  MED REC NO:   [id number]                           ROOM:  ACCOUNT NO:   [de-identified]                           ADMIT DATE: 2021  PROVIDER:     Roxann Jimenez. MD Miguel    DATE OF STUDY:  2021    CPAP/BIPAP TITRATION STUDY REPORT    REFERRING PROVIDER:  Marzena Hernandez PA-C    The patient's height is 73 inches, weight is 362 pounds with a BMI of  47.8. HISTORY:  The patient is a 81-year-old gentleman who underwent initial  baseline sleep study on 2021. The patient was diagnosed with  severe obstructive sleep apnea with worsening of respiratory events in  supine position. The patient had associated comorbidities including  hypertension and nocturnal hypoxia. The patient is scheduled for CPAP  titration as a treatment. METHODS:  The patient underwent digital polysomnography in compliance  with the standards and specifications from the AASM Manual including the  simultaneous recording of 3 EEG channels (F4-M1, C4-M1, and O2-M1 with  back up electrodes F3-M2, C3-M2, and O1-M2), 2 EOG channels (E1-M2, and  E2-M1,), EMG (chin, left & right leg), EKG, Nonin pulse oximetry with   less than 2 second averaging time, body position, airflow recorded by  oral-nasal thermal sensor and nasal air pressure transducer, plus  respiratory effort recorded by calibrated respiratory inductance  plethysmography (RIP), flow volume loop, sound and video. Sleep staging  and scoring followed the standard put forth by the American Academy of  Sleep Medicine and utilized the 4A obstructive hypopnea event  desaturation of 4 percent or greater. INTERPRETATION:  This is a CPAP titration study, and the study was  performed on 2021.   The study was started at 09:59 p.m. and was  terminated at 05:24 a.m. with a total recording time of 445 minutes,  sleep period time was 435.3 minutes, total sleep time was 368.8 minutes,  and overall sleep efficiency was 82.9%. The sleep onset latency was 9.7  minutes, wake after sleep onset was 66.5 minutes, and REM sleep latency  was 137 minutes. SLEEP STAGING AND DISTRIBUTION SUMMARY:  Revealed the patient spent 60.5  minutes in stage I consisting of 16.4%, 263.3 minutes in stage II  consisting of 71.4%, 45 minutes in REM sleep consisting of 12.2%. The  patient had absent slow-wave sleep. CPAP TITRATION STUDY:  The CPAP titration study was started with a CPAP  pressure of 5 cm of water, and the CPAP pressure was gradually increased  to a CPAP pressure of 16 cm of water by titrating to apneas and  hypopneas. Due to persistence of hypoxia and difficulty with exhalation  on failed CPAP therapy, the CPAP mode was changed to bilevel pressures  with a starting BiPAP pressure of 17/13 cm of water. The BiPAP pressure  was further increased to a final BiPAP pressure of 18/14 cm of water. At a final BiPAP pressure of 18/14 cm of water, the patient spent 49.12  minutes in bed. Out of 49.12 minutes, the patient slept for a period of  28.46 minutes in non-REM sleep. The patient did not achieve REM sleep. The patient, however, achieved REM sleep at a CPAP pressure of 16 cm of  water. At a BiPAP pressure of 18/14 cm of water, the patient did not  have any apneas or hypopneas with the apnea-hypopnea index of 0. The  maximum oxygen desaturation recorded at this pressure was 90% with a  mean oxygen saturation of 91.1%. This titration study was performed on  room air. PERIODIC LIMB MOVEMENT ANALYSIS:  Revealed the patient had a total of 56  periodic limb movements. Out of 56, 12 of them are associated with  arousals with a PLM index of 9.1. PLM arousal index is 2. The patient  had a total of 23 spontaneous arousals with a spontaneous arousal index  of 3.7. EKG MONITORING:  Revealed normal sinus rhythm; however, towards the end  of the study, the EKG was found to be poor quality with irregular  baseline. IMPRESSION:  1. Severe obstructive sleep apnea. The patient had acceptable  titration to a BiPAP pressure of 18/14 cm of water with room air. 2.  Periodic limb movements with no significant arousals. 3.  Hypertension. 4.  Decreased and delayed REM sleep. RECOMMENDATIONS:  1. For the patient's sleep-disordered breathing, we recommend starting  therapy with a BiPAP pressure of 18/14 cm of water with room air. 2.  Specific recommendations include the patient's choice of interface  was F20 large size mask. 3.  The patient should be scheduled for followup with [facility] in six to eight weeks. I recommended BiPAP pressures  for clinical reevaluation with review of download. Thanks to Deisi Rodriguez PA-C, for giving me this opportunity to  participate in the care of this pleasant gentleman.         Luh Shields MD    D: 03/09/2021 16:44:34       T: 03/09/2021 22:13:18     [email or web address]  Job#: [id number]     Doc#: [id number]    CC:

## 2021-03-10 NOTE — TELEPHONE ENCOUNTER
Faxed BiPAP setup order to -[facility] per patient's request.      Geovani Rodrigues  3/10/2021

## 2021-03-12 LAB
SARS-COV-2: NOT DETECTED
SOURCE: NORMAL

## 2021-03-15 ENCOUNTER — TELEPHONE (OUTPATIENT)
Dept: CARDIOLOGY CLINIC | Age: 61
End: 2021-03-15

## 2021-03-15 ENCOUNTER — HOSPITAL ENCOUNTER (OUTPATIENT)
Dept: PULMONOLOGY | Age: 61
Discharge: HOME | End: 2021-03-15
Payer: MEDICARE

## 2021-03-15 ENCOUNTER — OFFICE VISIT (OUTPATIENT)
Dept: CARDIOLOGY CLINIC | Age: 61
End: 2021-03-15
Payer: MEDICARE

## 2021-03-15 VITALS
HEIGHT: 73 IN | OXYGEN SATURATION: 94 % | DIASTOLIC BLOOD PRESSURE: 74 MMHG | BODY MASS INDEX: 41.75 KG/M2 | WEIGHT: 315 LBS | SYSTOLIC BLOOD PRESSURE: 130 MMHG | HEART RATE: 65 BPM

## 2021-03-15 DIAGNOSIS — R06.09 DOE (DYSPNEA ON EXERTION): ICD-10-CM

## 2021-03-15 DIAGNOSIS — I10 ESSENTIAL HYPERTENSION: ICD-10-CM

## 2021-03-15 DIAGNOSIS — I50.32 CHF (CONGESTIVE HEART FAILURE), NYHA CLASS II, CHRONIC, DIASTOLIC (HCC): Primary | ICD-10-CM

## 2021-03-15 DIAGNOSIS — E66.01 OBESITY, CLASS III, BMI 40-49.9 (MORBID OBESITY) (HCC): ICD-10-CM

## 2021-03-15 DIAGNOSIS — R06.02 SOB (SHORTNESS OF BREATH): ICD-10-CM

## 2021-03-15 DIAGNOSIS — G47.33 OSA (OBSTRUCTIVE SLEEP APNEA): ICD-10-CM

## 2021-03-15 PROCEDURE — 1111F DSCHRG MED/CURRENT MED MERGE: CPT | Performed by: NURSE PRACTITIONER

## 2021-03-15 PROCEDURE — 4004F PT TOBACCO SCREEN RCVD TLK: CPT | Performed by: NURSE PRACTITIONER

## 2021-03-15 PROCEDURE — 3017F COLORECTAL CA SCREEN DOC REV: CPT | Performed by: NURSE PRACTITIONER

## 2021-03-15 PROCEDURE — 94060 EVALUATION OF WHEEZING: CPT

## 2021-03-15 PROCEDURE — 99214 OFFICE O/P EST MOD 30 MIN: CPT | Performed by: NURSE PRACTITIONER

## 2021-03-15 PROCEDURE — G8482 FLU IMMUNIZE ORDER/ADMIN: HCPCS | Performed by: NURSE PRACTITIONER

## 2021-03-15 PROCEDURE — G8417 CALC BMI ABV UP PARAM F/U: HCPCS | Performed by: NURSE PRACTITIONER

## 2021-03-15 PROCEDURE — G8427 DOCREV CUR MEDS BY ELIG CLIN: HCPCS | Performed by: NURSE PRACTITIONER

## 2021-03-15 PROCEDURE — 94726 PLETHYSMOGRAPHY LUNG VOLUMES: CPT

## 2021-03-15 PROCEDURE — 94729 DIFFUSING CAPACITY: CPT

## 2021-03-15 RX ORDER — SPIRONOLACTONE 50 MG/1
50 TABLET, FILM COATED ORAL 2 TIMES DAILY
Qty: 60 TABLET | Refills: 5 | Status: SHIPPED | OUTPATIENT
Start: 2021-03-15 | End: 2021-06-14 | Stop reason: SDUPTHER

## 2021-03-15 RX ORDER — BUMETANIDE 2 MG/1
2 TABLET ORAL DAILY
Qty: 40 TABLET | Refills: 5 | Status: ON HOLD | OUTPATIENT
Start: 2021-03-15 | End: 2021-05-27 | Stop reason: SDUPTHER

## 2021-03-15 RX ORDER — ATENOLOL 100 MG/1
TABLET ORAL
COMMUNITY
Start: 2021-02-19 | End: 2021-03-15 | Stop reason: ALTCHOICE

## 2021-03-15 ASSESSMENT — ENCOUNTER SYMPTOMS
ABDOMINAL DISTENTION: 0
COUGH: 0
SHORTNESS OF BREATH: 1

## 2021-03-15 NOTE — TELEPHONE ENCOUNTER
Per pharmacy pt filled Coreg and Atenolol - will stop Atenolol  Call pt and have him stop - monitor BP - if greater than 140/80 increase Hydralazine back to 75 TID

## 2021-03-15 NOTE — PROGRESS NOTES
Heart Failure Clinic       Visit Date: 3/15/2021  Cardiologist:  Dr. Laura Radford  Primary Care Physician: Dr. Helio Villarreal, DO    Génesis Malone is a 64 y.o. male who presents today for:  Chief Complaint   Patient presents with    Congestive Heart Failure       HPI:   Génesis Malone is a 64 y.o. male who presents to the office for a follow up patient visit in the heart failure clinic. Accompanied by no one    TYPE HF: HFrecoveredEF (40-45% - improved to 55-60% 12/2020)  Oswaldo Ocean, DM, LAURIE (CPAP), CKD Mani Nini), Partial colectomy (2017), never smoker (wife does)     Dry Wt:  ???     Hospitalization:  March 2021 - CHF - SOB. + CXR for Pulm congestion. BNP 1279. BP meds adjusted. Changed to Bumex. OV Nallu 11/19 - BP elevated, +CP, SOB  12/1 = LHC w/ Nallu = nonobstructive disease, elevated LVEDP - given Lasix 80 IV x 1  Low K+ on admission - 2.9 = issues w/ Hypokalemia     Concerns today: depressed, can't do what he wants do - gets winded easily. Feels little fluid overloaded. Did PFTs this AM = SOB walking from testing. Issues w/ CPAP - not working right - seeing [employer] in April  Activity: winded easily - good/bad days - has stairs at home, can do depends on day. Was hospitalized 2 weeks ago for CHF - states came on relatively fast - SOB. Wts had not gone up.     Changed to Bumex    Patient has:  Chest Pain: no  SOB: yes  Orthopnea/PND: no  LAURIE: compliant w/ CPAP  Edema: mild  Fatigue: yes  Abdominal bloating: no  Cough: no  Appetite: good  Any extra diuretic use: no  Weight gain: no  Home weight: 355  Home blood pressure: stable    Past Medical History:   Diagnosis Date    Arthritis     Back problem     Constipation     Diabetes mellitus ([address]     Hypertension     Sleep apnea     has CPAP    Thyroid disease      Past Surgical History:   Procedure Laterality Date    ABDOMEN SURGERY      APPENDECTOMY      CARPAL TUNNEL RELEASE Bilateral     COLONOSCOPY  2017     Ora Raquel    DILATATION, ESOPHAGUS      HERNIA REPAIR      x3 surgeries with 14 repairs    KNEE SURGERY Right 1980's    cartilage    ID EXPLORATORY OF ABDOMEN N/A 2/5/2018    ABDOMINAL EXPLORATION WITH LYSIS OF COMPLICATED ADHESIONS WITH AN EXTENDED RIGHT COLON RESECTION performed by Joy Peñaloza MD at [address] [place] [address] History   Problem Relation Age of Onset    Cancer Mother         breast    Heart Disease Father         bladder, lung    Cancer Father     Stroke Brother     Heart Attack Brother     Prostate Cancer Brother     Diabetes Paternal Grandmother     Arthritis Brother     High Blood Pressure Neg Hx      Social History     Tobacco Use    Smoking status: Never Smoker    Smokeless tobacco: Current User     Types: Chew    Tobacco comment: quit pipe over 30 yrs ago   Substance Use Topics    Alcohol use: No     Alcohol/week: 0.0 standard drinks     Comment: quit     Current Outpatient Medications   Medication Sig Dispense Refill    spironolactone (ALDACTONE) 50 MG tablet Take 1 tablet by mouth 2 times daily 60 tablet 5    bumetanide (BUMEX) 2 MG tablet Take 1 tablet by mouth daily AND as directed by CHF clinic 40 tablet 5    oxyCODONE-acetaminophen (PERCOCET)  MG per tablet Take 1 tablet by mouth every 8 hours as needed for Pain for up to 30 days. 90 tablet 0    cloNIDine (CATAPRES) 0.1 MG tablet Take 1 tablet by mouth 3 times daily 90 tablet 0    hydrALAZINE (APRESOLINE) 50 MG tablet Take 1 tablet by mouth every 8 hours 90 tablet 0    potassium chloride (MICRO-K) 10 MEQ extended release capsule Take 2 capsules by mouth daily 60 capsule 0    levothyroxine (SYNTHROID) 200 MCG tablet Take 1 tablet by mouth Daily 30 tablet 1    zolpidem (AMBIEN CR) 12.5 MG extended release tablet Take 12.5 mg by mouth nightly as needed.  gabapentin (NEURONTIN) 300 MG capsule Take 300 mg by mouth nightly.       amLODIPine (NORVASC) 10 MG tablet Take 10 mg by mouth daily      bismuth subsalicylate (PEPTO BISMOL) 262 MG/15ML suspension Take by mouth daily      glimepiride (AMARYL) 4 MG tablet Take 4 mg by mouth daily      losartan (COZAAR) 25 MG tablet Take 1 tablet by mouth daily 30 tablet 5    amitriptyline (ELAVIL) 10 MG tablet Take 1 tablet by mouth nightly 30 tablet 5    aspirin EC 81 MG EC tablet Take 1 tablet by mouth daily 90 tablet 1    rOPINIRole (REQUIP) 0.5 MG tablet Take 1 pill in afternoon and 2 pills at night. 90 tablet 2    oxybutynin (DITROPAN XL) 10 MG extended release tablet Take 1 tablet by mouth daily 90 tablet 3    carvedilol (COREG) 25 MG tablet Take 1 tablet by mouth 2 times daily 180 tablet 3    tamsulosin (FLOMAX) 0.4 MG capsule Take 1 capsule by mouth nightly 90 capsule 3    acetaminophen (TYLENOL) 500 MG tablet Take 1 tablet by mouth every 6 hours as needed for Pain 40 tablet 0    pantoprazole (PROTONIX) 40 MG tablet Take 1 tablet by mouth every morning (before breakfast) (Patient taking differently: Take 40 mg by mouth as needed ) 30 tablet 0    cyclobenzaprine (FLEXERIL) 10 MG tablet Take 10 mg by mouth 3 times daily as needed for Muscle spasms      albuterol (PROVENTIL HFA;VENTOLIN HFA) 108 (90 BASE) MCG/ACT inhaler Inhale 1 puff into the lungs every 6 hours as needed for Wheezing.  azelastine (ASTELIN) 137 MCG/SPRAY nasal spray 1 spray by Nasal route as needed. Use in each nostril as directed       No current facility-administered medications for this visit. Allergies   Allergen Reactions    Shellfish-Derived Products Swelling    Pcn [Penicillins] Itching    Succinylcholine      Pseudocholinesterase Deficiency    Morphine Nausea And Vomiting     \"head swimming, skin crawling\"    Tape [Adhesive Tape] Rash       SUBJECTIVE:   Review of Systems   Constitutional: Positive for fatigue. Negative for activity change and appetite change. Respiratory: Positive for shortness of breath (AKINS). Negative for cough.     Cardiovascular: Positive for leg swelling (scant). Negative for chest pain and palpitations. Gastrointestinal: Negative for abdominal distention. Neurological: Negative for weakness, light-headedness and headaches. Hematological: Negative for adenopathy. Psychiatric/Behavioral: Negative for sleep disturbance. OBJECTIVE:   Today's Vitals:  /74   Pulse 65   Ht 6' 1\" (1.854 m)   Wt (!) 355 lb 6.4 oz (161.2 kg)   SpO2 94%   BMI 46.89 kg/m²     Physical Exam  Vitals signs reviewed. Constitutional:       General: He is not in acute distress. Appearance: Normal appearance. He is well-developed. He is not diaphoretic. HENT:      Head: Normocephalic and atraumatic. Eyes:      Conjunctiva/sclera: Conjunctivae normal.   Neck:      Musculoskeletal: Normal range of motion and neck supple. Comments: No JVD  Cardiovascular:      Rate and Rhythm: Normal rate and regular rhythm. Heart sounds: Normal heart sounds. No murmur. Pulmonary:      Effort: Pulmonary effort is normal. No respiratory distress. Breath sounds: Normal breath sounds. No wheezing or rales. Abdominal:      General: Bowel sounds are normal. There is no distension. Palpations: Abdomen is soft. Tenderness: There is no abdominal tenderness. Musculoskeletal: Normal range of motion. Right lower leg: Edema (scant ankle) present. Left lower leg: Edema (scant ankle) present. Skin:     General: Skin is warm and dry. Capillary Refill: Capillary refill takes less than 2 seconds. Neurological:      Mental Status: He is alert and oriented to person, place, and time.       Coordination: Coordination normal.   Psychiatric:         Behavior: Behavior normal.         Wt Readings from Last 3 Encounters:   03/15/21 (!) 355 lb 6.4 oz (161.2 kg)   03/03/21 (!) 357 lb 6.4 oz (162.1 kg)   02/11/21 (!) 355 lb 3.2 oz (161.1 kg)     BP Readings from Last 3 Encounters:   03/15/21 130/74   03/03/21 (!) 150/78   02/11/21 (!) 150/60     Pulse Readings from Last 3 Encounters:   03/15/21 65   03/03/21 67   02/11/21 90     Body mass index is 46.89 kg/m². ECHO:    Summary   Technically difficult examination.   Left ventricular size and systolic function is normal. Ejection fraction   was estimated at 55-60%. LV wall thickness is within normal limits and   there are no obvious wall motion abnormalities.      Signature      ----------------------------------------------------------------   Electronically signed by Linda Christiansen MD (Interpreting   physician) on 12/15/2020 at 03:22 PM   ----------------------------------------------------------------     ECHO   Summary   Technically difficult examination.   Left ventricular size is normal and systolic function is mildly reduced.   Ejection fraction was estimated at 40-45%. LV wall thickness is within   normal limits.   The right ventricular size appears normal with normal systolic function   and wall thickness.      Signature      ----------------------------------------------------------------   Electronically signed by Linda Christiansen MD (Interpreting   physician) on 04/26/2019 at 05:05 PM   ----------------------------------------------------------------     2013 EF 55-60%        CATH/STRESS:   RIGHT HEART CATHETERIZATION:  1.  RA is 25.  2.  RV is 80/23, 27.  3.  PA is 82/44, 59.  4.  Wedge pressure is 35.  5.  LV is 188/28, 36.  6.  AO was 205/126, 160.  7.  AO saturation is 91%. 8.  PA saturation is 63%. 9.  Cardiac output by Cherelle method is 5.9. 10.  Cardiac index is 2.1.     CORONARY ANATOMY:  1.  Right coronary artery is a dominant vessel.  The vessel is quite  large and has mild luminal irregularities in the proximal, mid, and  distal portions of the vessel; otherwise, it is patent. 2.  Left main is patent, gives rise to LAD and left circumflex arteries. 3.  Left circumflex artery is patent without any significant disease.   4.  LAD is patent in the proximal portion, mid portion there is a 30% to  40% stenosis followed by mild luminal irregularities in the distal  portion of the LAD. 5.  LV gram was not performed due to renal dysfunction.     The patient tolerated the procedure well without any significant issues  or complications.  Hemostasis was achieved with a Vasc Band device.     SUMMARY:  1.  Elevated right heart pressures with elevated LVEPD. 2.  Patent coronaries.     RECOMMENDATIONS:  1.  IV diuresis.  (Lasix 80 IVP x 1)  2.  Monitor electrolytes. 3.  Optimize heart failure therapy. 4.  Weight loss advised. 5.  Optimize sleep apnea therapies. 6.  Follow up in clinic in one to two weeks to evaluate symptoms  further.     Beba Doherty MD     D: 12/06/2020 21:57:38       T: 12/06/2020 23:06:51     [email or web address]  Job#: [id number]     Doc#: [id number]       Results reviewed:  BNP: No results found for: BNP  CBC:   Lab Results   Component Value Date    WBC 7.3 03/03/2021    RBC 4.41 03/03/2021    RBC 5.15 08/12/2011    HGB 12.9 03/03/2021    HCT 39.9 03/03/2021     03/03/2021     CMP:    Lab Results   Component Value Date     03/03/2021    K 3.7 03/03/2021    K 2.9 12/01/2020     03/03/2021    CO2 30 03/03/2021    BUN 12 03/03/2021    CREATININE 1.2 03/03/2021    LABGLOM 62 03/03/2021    GLUCOSE 102 03/03/2021    CALCIUM 8.9 03/03/2021     Hepatic Function Panel:    Lab Results   Component Value Date    ALKPHOS 67 03/02/2021    ALT 23 03/02/2021    AST 16 03/02/2021    PROT 7.5 03/02/2021    BILITOT 0.7 03/02/2021    BILIDIR <0.2 03/02/2021    LABALBU 3.6 03/02/2021     Magnesium:    Lab Results   Component Value Date    MG 2.1 03/03/2021     PT/INR:    Lab Results   Component Value Date    INR 1.06 12/01/2020     Lipids:    Lab Results   Component Value Date    TRIG 127 03/03/2021    HDL 27 03/03/2021    LDLCALC 77 03/03/2021       ASSESSMENT AND PLAN:   The patient's condition/symptoms are Stable. Diagnosis Orders   1.  CHF (congestive heart failure), NYHA class II/III, chronic,

## 2021-03-15 NOTE — PATIENT INSTRUCTIONS
You may receive a survey regarding the care you received during your visit. Your input is valuable to us. We encourage you to complete and return your survey. We hope you will choose us in the future for your healthcare needs. Continue:  · Continue current medications  · Daily weights and record  · Fluid restriction of 2 Liters per day  · Limit sodium in diet to around 0369-8129 mg/day  · Monitor BP  · Activity as tolerated     Call the [facility] for any of the following symptoms: [phone]   Weight gain of 2-3 pounds in 1 day or 5 pounds in 1 week   Increased shortness of breath   Shortness of breath while laying down   Cough   Chest pain   Swelling in feet, ankles or legs   Tenderness or bloating in the abdomen   Fatigue    Decreased appetite or nausea    Confusion     Take extra Bumex half tab today and tomorrow. Get blood work in 2 weeks.

## 2021-03-18 ENCOUNTER — TELEPHONE (OUTPATIENT)
Dept: CARDIOLOGY CLINIC | Age: 61
End: 2021-03-18

## 2021-03-18 ENCOUNTER — IMMUNIZATION (OUTPATIENT)
Dept: PRIMARY CARE CLINIC | Age: 61
End: 2021-03-18
Payer: MEDICARE

## 2021-03-18 PROCEDURE — 91300 COVID-19, PFIZER VACCINE 30MCG/0.3ML DOSE: CPT | Performed by: FAMILY MEDICINE

## 2021-03-18 PROCEDURE — 0001A COVID-19, PFIZER VACCINE 30MCG/0.3ML DOSE: CPT | Performed by: FAMILY MEDICINE

## 2021-03-22 ENCOUNTER — OFFICE VISIT (OUTPATIENT)
Dept: PHYSICAL MEDICINE AND REHAB | Age: 61
End: 2021-03-22
Payer: MEDICARE

## 2021-03-22 ENCOUNTER — TELEPHONE (OUTPATIENT)
Dept: CARDIOLOGY CLINIC | Age: 61
End: 2021-03-22

## 2021-03-22 VITALS
BODY MASS INDEX: 41.75 KG/M2 | DIASTOLIC BLOOD PRESSURE: 80 MMHG | SYSTOLIC BLOOD PRESSURE: 126 MMHG | HEIGHT: 73 IN | TEMPERATURE: 97.3 F | WEIGHT: 315 LBS

## 2021-03-22 DIAGNOSIS — M47.816 SPONDYLOSIS OF LUMBAR REGION WITHOUT MYELOPATHY OR RADICULOPATHY: ICD-10-CM

## 2021-03-22 DIAGNOSIS — M25.561 CHRONIC PAIN OF RIGHT KNEE: ICD-10-CM

## 2021-03-22 DIAGNOSIS — M48.062 SPINAL STENOSIS OF LUMBAR REGION WITH NEUROGENIC CLAUDICATION: ICD-10-CM

## 2021-03-22 DIAGNOSIS — G89.29 CHRONIC PAIN OF RIGHT KNEE: ICD-10-CM

## 2021-03-22 DIAGNOSIS — M25.511 CHRONIC RIGHT SHOULDER PAIN: ICD-10-CM

## 2021-03-22 DIAGNOSIS — M47.816 LUMBAR FACET ARTHROPATHY: ICD-10-CM

## 2021-03-22 DIAGNOSIS — G89.29 CHRONIC RIGHT SHOULDER PAIN: ICD-10-CM

## 2021-03-22 DIAGNOSIS — R06.09 DOE (DYSPNEA ON EXERTION): Primary | ICD-10-CM

## 2021-03-22 DIAGNOSIS — G89.4 CHRONIC PAIN SYNDROME: ICD-10-CM

## 2021-03-22 DIAGNOSIS — G62.9 NEUROPATHY: ICD-10-CM

## 2021-03-22 DIAGNOSIS — M54.17 LUMBOSACRAL RADICULITIS: Primary | ICD-10-CM

## 2021-03-22 DIAGNOSIS — R06.02 SOB (SHORTNESS OF BREATH): ICD-10-CM

## 2021-03-22 DIAGNOSIS — M54.2 NECK PAIN: ICD-10-CM

## 2021-03-22 PROCEDURE — 4004F PT TOBACCO SCREEN RCVD TLK: CPT | Performed by: NURSE PRACTITIONER

## 2021-03-22 PROCEDURE — 3017F COLORECTAL CA SCREEN DOC REV: CPT | Performed by: NURSE PRACTITIONER

## 2021-03-22 PROCEDURE — 99213 OFFICE O/P EST LOW 20 MIN: CPT | Performed by: NURSE PRACTITIONER

## 2021-03-22 PROCEDURE — G8482 FLU IMMUNIZE ORDER/ADMIN: HCPCS | Performed by: NURSE PRACTITIONER

## 2021-03-22 PROCEDURE — 1111F DSCHRG MED/CURRENT MED MERGE: CPT | Performed by: NURSE PRACTITIONER

## 2021-03-22 PROCEDURE — G8417 CALC BMI ABV UP PARAM F/U: HCPCS | Performed by: NURSE PRACTITIONER

## 2021-03-22 PROCEDURE — G8427 DOCREV CUR MEDS BY ELIG CLIN: HCPCS | Performed by: NURSE PRACTITIONER

## 2021-03-22 RX ORDER — GABAPENTIN 300 MG/1
300 CAPSULE ORAL NIGHTLY
Qty: 90 CAPSULE | Refills: 0 | Status: SHIPPED | OUTPATIENT
Start: 2021-03-22 | End: 2021-06-07 | Stop reason: SDUPTHER

## 2021-03-22 ASSESSMENT — ENCOUNTER SYMPTOMS
COUGH: 1
SHORTNESS OF BREATH: 1
BACK PAIN: 1

## 2021-03-22 NOTE — TELEPHONE ENCOUNTER
----- Message from Ann Salinas MD sent at 3/19/2021  6:19 PM EDT -----  Jeremi Miranda,     Please send him back to my pulmonary clinic at [address], [address] for pulmonary disease for further evaluation of his exertional dyspnea. My office should be contacting him along with your referral.    Miguel.      ----- Message -----  From: SAHIL Rojas CNP  Sent: 3/19/2021  11:00 AM EDT  To: Ann Salinas MD    [facility]    I see you follow this gentleman for sleep apnea. I ordered a PFT for ongoing AKINS/SOB. I am not the best at interpreting. He is already on PRN Albuterol. Would it be beneficial to see you in Pulmonary for any additional therapies? If so I would put the referral in.       Thanks so much   Julio Cesar Reyes      ----- Message -----  From: Chaitanya Dumont Incoming Muse Results  Sent: 3/15/2021   9:50 AM EDT  To: SAHIL Rojas CNP

## 2021-03-22 NOTE — PROGRESS NOTES
[facility] [address]  Dept: [phone]  Dept Fax: [phone]: [phone]    Visit Date: 3/22/2021    Functionality Assessment/Goals Worksheet     On a scale of 0 (Does not Interfere) to 10 (Completely Interferes)     1. Which number describes how during the past week pain has interfered with       the following:  A. General Activity:  9  B. Mood: 9  C. Walking Ability:  10  D. Normal Work (Includes both work outside the home and housework):  9  E. Relations with Other People:   8  F. Sleep:   9  G. Enjoyment of Life:   10    2. Patient Prefers to Take their Pain Medications:     [x]  On a regular basis   []  Only when necessary    []  Does not take pain medications    3. What are the Patient's Goals/Expectations for Visiting Pain Management? [x]  Learn about my pain    []  Receive Medication   []  Physical Therapy     []  Treat Depression   []  Receive Injections    []  Treat Sleep   [x]  Deal with Anxiety and Stress   []  Treat Opoid Dependence/Addiction   []  Other:      HPI:   Hermila Barrios is a 64 y.o. male is here today for    Chief Complaint: Low pain, leg pain     HPI   2 month FU. Main complaint remains pain in lower back and radiates across lower back and down into buttocks/ SI area and down right lower extremity posteriorly to feet. Pain is constant and is described as a \"tooth ache pain in back and buttocks\" in right leg is burning stabbing and numbness. Pain is more bothersome across lower back compared to leg 70% bothersome compared to 30% bothersome in leg.      Patient recently had 1 COVID shot last week     Also having pain in right knee tooth ache pain, grinding and increased with walking   Also having complaints of right shoulder pain- stabbing and aching.  Still has not gotten righto shoulder xray completed   Continues Percocet and Neurontin which helps take the edge off. Medications reviewed. Patient denies side effects with medications. Patient states he is taking medications as prescribed. Hedenies receiving pain medications from other sources. He had 1 ER visit sice last visit for CHF. Pain scale with out pain medications or at its worst is 8-9/10. Pain scale with pain medications or at its best is 5/10. Last dose of Percocet and neurontin was yesterday  Drug screen reviewed from 1/18/2021 and was appropriate  Pill count was completed today and was appropriate  Patient does not have naloxone available at home. Patient has not required use of naloxone at home since last office visit. The patientis allergic to shellfish-derived products; pcn [penicillins]; succinylcholine; morphine; and tape [adhesive tape]. Subjective:      Review of Systems   Constitutional: Negative. Respiratory: Positive for cough and shortness of breath. Cardiovascular: Positive for leg swelling. Musculoskeletal: Positive for arthralgias, back pain, gait problem, joint swelling, myalgias and neck stiffness. Negative for neck pain. Neurological: Positive for weakness and numbness. Psychiatric/Behavioral: Negative. Objective:     Vitals:    03/22/21 0902   BP: 126/80   Temp: 97.3 °F (36.3 °C)   Weight: (!) 355 lb (161 kg)   Height: 6' 1\" (1.854 m)       Physical Exam  Constitutional:       General: He is not in acute distress. Appearance: Normal appearance. He is obese. He is not ill-appearing, toxic-appearing or diaphoretic. HENT:      Head: Normocephalic and atraumatic. Right Ear: External ear normal. There is no impacted cerumen. Left Ear: External ear normal. There is no impacted cerumen. Nose: Nose normal. No congestion or rhinorrhea. Mouth/Throat:      Mouth: Mucous membranes are moist.      Pharynx: Oropharynx is clear. Eyes:      Extraocular Movements: Extraocular movements intact.       Conjunctiva/sclera: Conjunctivae normal.      Pupils: Pupils are equal, round, and reactive to light. Neck:      Musculoskeletal: Neck supple. Muscular tenderness present. Cardiovascular:      Rate and Rhythm: Normal rate and regular rhythm. Pulses: Normal pulses. Heart sounds: Murmur present. Pulmonary:      Effort: Pulmonary effort is normal. No respiratory distress. Breath sounds: Normal breath sounds. Abdominal:      General: Abdomen is flat. Bowel sounds are normal. There is no distension. Palpations: Abdomen is soft. There is no mass. Tenderness: There is no abdominal tenderness. Hernia: No hernia is present. Musculoskeletal:         General: Tenderness present. Right shoulder: He exhibits decreased range of motion, tenderness and pain. Right wrist: He exhibits decreased range of motion and tenderness. Left wrist: He exhibits decreased range of motion and tenderness. Right hip: He exhibits decreased range of motion and decreased strength. Left hip: He exhibits decreased range of motion and decreased strength. Right knee: He exhibits decreased range of motion and swelling. Tenderness found. Right ankle: He exhibits decreased range of motion and swelling. Tenderness. Achilles tendon exhibits pain. Left ankle: Tenderness. Achilles tendon exhibits pain. Cervical back: He exhibits decreased range of motion, tenderness and bony tenderness. Thoracic back: He exhibits decreased range of motion and bony tenderness. Lumbar back: He exhibits decreased range of motion, tenderness, bony tenderness and pain. Back:       Right lower leg: Edema present. Left lower leg: Edema present. Skin:     General: Skin is warm and dry. Capillary Refill: Capillary refill takes less than 2 seconds. Neurological:      General: No focal deficit present. Mental Status: He is alert and oriented to person, place, and time.       Sensory: Sensory deficit present. Motor: Weakness present. Coordination: Romberg sign positive. Coordination abnormal.      Gait: Gait abnormal.      Deep Tendon Reflexes:      Reflex Scores:       Tricep reflexes are 2+ on the right side and 2+ on the left side. Bicep reflexes are 2+ on the right side and 2+ on the left side. Brachioradialis reflexes are 2+ on the right side and 2+ on the left side. Patellar reflexes are 1+ on the right side and 1+ on the left side. Achilles reflexes are 1+ on the right side and 1+ on the left side. Comments: 4/5/strength bilateral lower extremities    + bilateral SLR at 40 degrees    Decraesed sensation bilateral feet    Psychiatric:         Mood and Affect: Mood normal.         Behavior: Behavior normal.       SAUNDRA test: + bilaterally   Yeomans test: + bilaterally   Gaenslen test: + bilaterally      Assessment:     1. Lumbosacral radiculitis    2. Spinal stenosis of lumbar region with neurogenic claudication    3. Spondylosis of lumbar region without myelopathy or radiculopathy    4. Lumbar facet arthropathy    5. Neuropathy    6. Neck pain    7. Chronic right shoulder pain    8. Chronic pain of right knee    9. Chronic pain syndrome            Plan:      · OARRS reviewed. Current MED: 45.00  · Patient was offered naloxone for home. · Discussed long term side effects of medications, tolerance, dependency and addiction. · Previous UDS reviewed  · UDS preformed today for compliance. · Patient told can not receive any pain medications from any other source. · No evidence of abuse, diversion or aberrant behavior.  Medications and/or procedures to improve function and quality of life- patient understanding with this and that may not be pain free   Discussed with patient about safe storage of medications at home   Discussed possible weaning of medication dosing dependent on treatment/procedure results.     Discussed with patient about risks with procedure including infection, reaction to medication, increased pain, or bleeding. · Reviewed L-Xray and L-MRI in detail  · Discussed L-facet JOSÉ LUIS TRACEY possibly in future. Patient wants to hold off at this time d/t heart issues following in CHF clinic and because of Virus   · Reordered right shoulder xray and ordered right knee xray d/t increased pain    Continue home exercises. Reordered outpatient PT.    Continue Percocet 10/325 TID prn filled 3/9/2021, Continue Neurontin 300 mg at bedtime- ordered refill       Meds. Prescribed:   Orders Placed This Encounter   Medications    gabapentin (NEURONTIN) 300 MG capsule     Sig: Take 1 capsule by mouth nightly for 90 days. Dispense:  90 capsule     Refill:  0       Return in about 2 months (around 5/22/2021), or if symptoms worsen or fail to improve, for follow up  for medications and review xrays.                Electronically signed by SAHIL Lu CNP on3/22/2021 at 9:20 AM

## 2021-03-25 NOTE — PROGRESS NOTES
[facility]    Patient: Yariel Orellana, 64 y.o.   : 1960  3/26/2021         Subjective     Chief Complaint   Patient presents with    New Patient     New pulmonary consult ref by Neha Campbell for SOB. CXR/CT 3/2/2021 PFT 3/15/2021    Other     Neck 21.75 mp 4         HPI  Liston Dance is here for evaluation of shortness of breath with exertion with referral from Ms. Neha Campbell CNP from the [address] [place] [address]. Patient PMH significant for arthritis, morbid obesity, CHF, HTN, thyroid disease, sleep apnea on CPAP. Never a smoker- no obstruction on recent PFT as well   PFT done only showing mild restriction most likely 2/2 obesity ERV low at 48 but DLCO normal   No home O2 use- will do 6MWT today in the office   LAURIE on CPAP- follows with Ms. Gabe Francis PA-C patient looks to be well controlled by last download. Patient today admits PAP machine hasn't working 2020 and has been using it without it working properly since then. Will order new machine today [facility] with [facility]. Will keep VV with our Sleep Clinic as previously scheduled  Morbid obesity BMI 46.97. No recent CXR to view- last one done 3/2/21 demonstrating pulmonary edema with suspicion for CHF. Albuterol inhaler per Ms. Barney CNP- states works sometimes and using daily   A couple weeks ago states it didn't work but he also was in CHF exacerbation which required hospital admission- had fever then but Covid was ruled out at that time. States they were unsure what caused his fever at that time- did do some ATB therapy in the hospital but was not sent home on ATB.    History of asthma as a child-  at that time told him he would \"outgrow it\"   Niox today elevated at 35 (+) for some airway inflammation   Wheezing at times sometimes mainly at night when he does experience it - no hx of Singulair use   (+) allergies using nasal spray     Exacerbating factors- walking- any ADLs- states his most activity is cooking dinner  Alleviating factors- resting - has Albuterol inhaler uses probably once a day-   Timing-exertion, certain times of day  Associated symptoms- diaphoretic, heart palpitations intermittently- occasional cough- at times productive white to clear in color- noticing coughing after he finishes a meal and he is relaxing   Exercise tolerance number of steps distance on level ground- patient has 18 steps to bedroom and usually has to stop at the landing     Patient was never diagnosed with chronic respiratory condition ie asthma, COPD, or ILD. Patient has not been admitted or treated for pneumonia, pulmonary embolism, or respiratory failure. Patient has not been intubated for reasons other than planned procedures. Social History  Patient job history included  He has had exposure to aerosolized particles or hazardous fumes. (Coal, dust, asbestos, molds ie Hay)  denies living on a farm that primarily raised crops, livestock or combination  admits to passive tobacco exposure from parents or work environment. denies to active tobacco smoking 0 PPD for 0 years for 0 pack years   admits to exposure to pets/animals at home. 2 dogs  denies exposure to tuberculosis.   admits to hobbies they can no longer perform due to shortness of breath- shooting, wood working    Flu vaccine UTD  Covid vaccinations done x 1 dose next does 4/8/21  Pneumonia vaccine- UTD  Past Medical hx   PMH:  Past Medical History:   Diagnosis Date    Arthritis     Back problem     Constipation     Diabetes mellitus ([facility] [address]     Hypertension     Sleep apnea     has CPAP    Thyroid disease      SURGICAL HISTORY:  Past Surgical History:   Procedure Laterality Date    ABDOMEN SURGERY      APPENDECTOMY      CARPAL TUNNEL RELEASE Bilateral     COLONOSCOPY  2017    Dr. Kisha Naidu, ESOPHAGUS     [address]      x3 surgeries with 14 repairs    KNEE SURGERY Right 1980's    cartilage    OH EXPLORATORY OF ABDOMEN N/A 2/5/2018 ABDOMINAL EXPLORATION WITH LYSIS OF COMPLICATED ADHESIONS WITH AN EXTENDED RIGHT COLON RESECTION performed by Amy Pineda MD at [address] [place] [address] Road:  Social History     Tobacco Use    Smoking status: Never Smoker    Smokeless tobacco: Current User     Types: Chew    Tobacco comment: quit pipe over 30 yrs ago   Substance Use Topics    Alcohol use: No     Alcohol/week: 0.0 standard drinks     Comment: quit    Drug use: No     ALLERGIES:  Allergies   Allergen Reactions    Shellfish-Derived Products Swelling    Pcn [Penicillins] Itching    Succinylcholine      Pseudocholinesterase Deficiency    Morphine Nausea And Vomiting     \"head swimming, skin crawling\"    Tape Glenice Juan Tape] Rash     FAMILY HISTORY:  Family History   Problem Relation Age of Onset    Cancer Mother         breast    Heart Disease Father         bladder, lung    Cancer Father     Stroke Brother     Heart Attack Brother     Prostate Cancer Brother     Diabetes Paternal Grandmother     Arthritis Brother     High Blood Pressure Neg Hx      CURRENT MEDICATIONS:  Current Outpatient Medications   Medication Sig Dispense Refill    FLOVENT DISKUS 250 MCG/BLIST AEPB inhaler Inhale 1 puff into the lungs daily Rinse mouth after its use. 1 each 11    montelukast (SINGULAIR) 10 MG tablet Take 1 tablet by mouth daily 30 tablet 11    gabapentin (NEURONTIN) 300 MG capsule Take 1 capsule by mouth nightly for 90 days. 90 capsule 0    spironolactone (ALDACTONE) 50 MG tablet Take 1 tablet by mouth 2 times daily 60 tablet 5    bumetanide (BUMEX) 2 MG tablet Take 1 tablet by mouth daily AND as directed by CHF clinic 40 tablet 5    oxyCODONE-acetaminophen (PERCOCET)  MG per tablet Take 1 tablet by mouth every 8 hours as needed for Pain for up to 30 days.  90 tablet 0    cloNIDine (CATAPRES) 0.1 MG tablet Take 1 tablet by mouth 3 times daily 90 tablet 0    hydrALAZINE (APRESOLINE) 50 MG tablet Take 1 tablet by mouth every 8 hours 90 tablet 0    potassium chloride (MICRO-K) 10 MEQ extended release capsule Take 2 capsules by mouth daily 60 capsule 0    levothyroxine (SYNTHROID) 200 MCG tablet Take 1 tablet by mouth Daily 30 tablet 1    zolpidem (AMBIEN CR) 12.5 MG extended release tablet Take 12.5 mg by mouth nightly as needed.  amLODIPine (NORVASC) 10 MG tablet Take 10 mg by mouth daily      bismuth subsalicylate (PEPTO BISMOL) 262 MG/15ML suspension Take by mouth daily      glimepiride (AMARYL) 4 MG tablet Take 4 mg by mouth daily      losartan (COZAAR) 25 MG tablet Take 1 tablet by mouth daily 30 tablet 5    amitriptyline (ELAVIL) 10 MG tablet Take 1 tablet by mouth nightly 30 tablet 5    aspirin EC 81 MG EC tablet Take 1 tablet by mouth daily 90 tablet 1    rOPINIRole (REQUIP) 0.5 MG tablet Take 1 pill in afternoon and 2 pills at night. 90 tablet 2    oxybutynin (DITROPAN XL) 10 MG extended release tablet Take 1 tablet by mouth daily 90 tablet 3    carvedilol (COREG) 25 MG tablet Take 1 tablet by mouth 2 times daily 180 tablet 3    acetaminophen (TYLENOL) 500 MG tablet Take 1 tablet by mouth every 6 hours as needed for Pain 40 tablet 0    pantoprazole (PROTONIX) 40 MG tablet Take 1 tablet by mouth every morning (before breakfast) (Patient taking differently: Take 40 mg by mouth as needed ) 30 tablet 0    cyclobenzaprine (FLEXERIL) 10 MG tablet Take 10 mg by mouth 3 times daily as needed for Muscle spasms      albuterol (PROVENTIL HFA;VENTOLIN HFA) 108 (90 BASE) MCG/ACT inhaler Inhale 1 puff into the lungs every 6 hours as needed for Wheezing.  azelastine (ASTELIN) 137 MCG/SPRAY nasal spray 1 spray by Nasal route as needed. Use in each nostril as directed      tamsulosin (FLOMAX) 0.4 MG capsule Take 1 capsule by mouth nightly 90 capsule 3     No current facility-administered medications for this visit. Gerldine Prost ROS   Review of Systems   Constitutional: Positive for fatigue.  Negative for chills, fever and unexpected weight change. HENT: Positive for postnasal drip. Eyes: Negative. Respiratory: Positive for shortness of breath (with activity) and wheezing (in evenings denies causing awakenings). Negative for chest tightness and stridor. Cardiovascular: Positive for palpitations (at times with SOB and alot of activity ) and leg swelling. Negative for chest pain. Gastrointestinal: Negative. Negative for diarrhea, nausea and vomiting. Endocrine: Negative. Genitourinary: Negative. Negative for dysuria. Musculoskeletal: Positive for back pain (ambulates with cane ). Skin: Negative. Allergic/Immunologic: Positive for environmental allergies. Neurological: Negative. Hematological: Negative. Psychiatric/Behavioral: Positive for sleep disturbance (LAURIE not using functional PAP machine). Physical exam   /82 (Site: Right Lower Arm, Position: Sitting, Cuff Size: Large Adult)   Pulse 75   Temp 96.4 °F (35.8 °C) (Tympanic)   Ht 6' 1\" (1.854 m)   Wt (!) 356 lb (161.5 kg)   SpO2 97% Comment: room air  BMI 46.97 kg/m²    Wt Readings from Last 3 Encounters:   03/26/21 (!) 356 lb (161.5 kg)   03/22/21 (!) 355 lb (161 kg)   03/15/21 (!) 355 lb 6.4 oz (161.2 kg)       Physical Exam  Vitals signs and nursing note reviewed. Constitutional:       General: He is not in acute distress. Appearance: He is well-developed. He is morbidly obese. Comments: Presents on room air using cane   HENT:      Head: Normocephalic and atraumatic. Neck:      Trachea: No tracheal deviation. Cardiovascular:      Rate and Rhythm: Normal rate and regular rhythm. Heart sounds: Normal heart sounds. No murmur. Pulmonary:      Effort: Pulmonary effort is normal. No respiratory distress. Breath sounds: No stridor. Decreased breath sounds (BLL lobes 2/2 body habitus) present. No wheezing or rales. Chest:      Chest wall: No tenderness.    Abdominal:      General: Bowel sounds are normal. There is no distension. Palpations: Abdomen is soft. Musculoskeletal:      Right lower le+ Pitting Edema present. Left lower le+ Pitting Edema present. Skin:     General: Skin is warm and dry. Capillary Refill: Capillary refill takes less than 2 seconds. Neurological:      Mental Status: He is alert and oriented to person, place, and time. Psychiatric:         Behavior: Behavior normal.         Thought Content: Thought content normal.         Judgment: Judgment normal.       results   Lung Nodule Screening     [] Qualifies    [x] Does not qualify   [] Declined    [] Completed   [facility] annual screening for lung cancer with low-dose computed tomography (LDCT) in adults aged 54 to [de-identified] years who have a 30 pack-year smoking history and currently smoke or have quit within the past 15 years. Screeningshould be discontinued once a person has not smoked for 15 years or develops a health problem that substantially limits life expectancy or the ability or willingness to have curative lung surgery. 3/26/21- NIOX 33 (+) airway inflammation               Last PAP download with controlled AHI             3/15/21  PFT done showing mild restriction most likely 2/2 morbid obesity with low ERV at 48. No obstruction. (+) BD response in pulmonary periphery FEF 25-75%. DLCO normal.      3/2/2021   XR CHEST (2 VW)   FINDINGS: There is mild stable enlargement of the cardiac silhouette. Pulmonary vessels remain congested. Pulmonary interstitium is prominent. Degenerative changes in the thoracic spine are poorly visualized. Soft tissues are unremarkable. 1. Mild stable cardiomegaly with pulmonary vascular congestion suspicious for congestive heart failure. 2. Probable pulmonary edema. 12/15/2020   ECHOCARDIOGRAM COMPLETE 2D W DOPPLER W COLOR. Conclusions      Summary   Technically difficult examination.    Left ventricular size and systolic function is normal. Ejection fraction   was are present. There are degenerative changes in the thoracic spine. There is bilateral gynecomastia. 1. No pulmonary emboli. 2. Mild mediastinal adenopathy. This was also present in 2017 but has slightly worsened. 3. Bilateral gynecomastia. 4. Coronary disease. 5. Bilateral patchy pulmonary opacities with ground glass attenuation. This can represent pulmonary edema. Pneumonia is also a consideration. Assessment      Diagnosis Orders   1. Shortness of breath  6 Minute Walk Test    Alpha-1-Antitrypsin   2. Restrictive lung disease secondary to obesity     3. Chronic diastolic heart failure ([address]     4. Obstructive sleep apnea on CPAP  DME Order for CPAP as OP   5. Cough  POCT Nitric Oxide   6. Mild intermittent asthma without complication  FLOVENT DISKUS 250 MCG/BLIST AEPB inhaler    montelukast (SINGULAIR) 10 MG tablet   7. Wheezing  montelukast (SINGULAIR) 10 MG tablet   8.  Ground glass opacity present on imaging of lung  CT CHEST WO CONTRAST    on CTA done 3/2/21        Plan   -PFT reviewed mild restriction most likely 2/2 obesity does have history of childhood asthma check Niox which was positive for inflammation would start low with just ICS for now and continue Albuterol PRN  -Starting Singulair 10 mg PO at HS due to some nighttime wheezing per patient   -6MWT today no home oxygen supplementation needed with rest or activity   -NIOX today in office 33 (+) for airway inflammation (over 25)   -Continue Albuterol PRN for SOB/wheezing  -RX done for Flovent 250 mcg diskus 1 puff daily; instructed to rinse mouth well after use to prevent thrush pt verbalized understanding   -DME in for new CPAP with pressure of 15 CM H2O- will schedule Sleep Clinic appt with SAMANTHA Retana-C can keep upcoming VV with her on 4/12/21 to make sure new machine is functioning properly   -A1A today in office rule out any AAT deficiency- will notify patient if abnormal results  -Advised to maintain pneumonia vaccine with PCP and to take flu vaccine this coming season.  -Advised patient to call office with any changes, questions, or concerns regarding respiratory status  -Weight loss encouraged- ?  Bariatric clinic evaluation in the future?   -Office note forwarded to PCP and Regino Jimenez CNP at [phone] reviewed from early March not real useful showing pulmonary edema most likely related to CHF- no overt signs of any exacerbations today breathing well no rales   -CT Chest w/o in 6 weeks prior to appt to check for resolution of GGO      Will see Kaitlin Oates in: 6 weeks for medication check and repeat NIOX testing to check for resolving airway inflammation     Electronically signed by SAHIL Tang - CNP on 3/26/2021 at 10:15 AM

## 2021-03-26 ENCOUNTER — OFFICE VISIT (OUTPATIENT)
Dept: PULMONOLOGY | Age: 61
End: 2021-03-26
Payer: MEDICARE

## 2021-03-26 VITALS
SYSTOLIC BLOOD PRESSURE: 128 MMHG | TEMPERATURE: 96.4 F | BODY MASS INDEX: 41.75 KG/M2 | HEIGHT: 73 IN | WEIGHT: 315 LBS | HEART RATE: 75 BPM | DIASTOLIC BLOOD PRESSURE: 82 MMHG | OXYGEN SATURATION: 97 %

## 2021-03-26 DIAGNOSIS — G47.33 OBSTRUCTIVE SLEEP APNEA ON CPAP: ICD-10-CM

## 2021-03-26 DIAGNOSIS — R06.2 WHEEZING: ICD-10-CM

## 2021-03-26 DIAGNOSIS — R06.02 SHORTNESS OF BREATH: Primary | ICD-10-CM

## 2021-03-26 DIAGNOSIS — Z99.89 OBSTRUCTIVE SLEEP APNEA ON CPAP: ICD-10-CM

## 2021-03-26 DIAGNOSIS — R05.9 COUGH: ICD-10-CM

## 2021-03-26 DIAGNOSIS — J45.20 MILD INTERMITTENT ASTHMA WITHOUT COMPLICATION: ICD-10-CM

## 2021-03-26 DIAGNOSIS — J98.4 RESTRICTIVE LUNG DISEASE SECONDARY TO OBESITY: ICD-10-CM

## 2021-03-26 DIAGNOSIS — E66.9 RESTRICTIVE LUNG DISEASE SECONDARY TO OBESITY: ICD-10-CM

## 2021-03-26 DIAGNOSIS — R91.8 GROUND GLASS OPACITY PRESENT ON IMAGING OF LUNG: ICD-10-CM

## 2021-03-26 DIAGNOSIS — I50.32 CHRONIC DIASTOLIC HEART FAILURE (HCC): ICD-10-CM

## 2021-03-26 PROCEDURE — G8417 CALC BMI ABV UP PARAM F/U: HCPCS | Performed by: NURSE PRACTITIONER

## 2021-03-26 PROCEDURE — 94618 PULMONARY STRESS TESTING: CPT | Performed by: NURSE PRACTITIONER

## 2021-03-26 PROCEDURE — 95012 NITRIC OXIDE EXP GAS DETER: CPT | Performed by: NURSE PRACTITIONER

## 2021-03-26 PROCEDURE — 1111F DSCHRG MED/CURRENT MED MERGE: CPT | Performed by: NURSE PRACTITIONER

## 2021-03-26 PROCEDURE — G8482 FLU IMMUNIZE ORDER/ADMIN: HCPCS | Performed by: NURSE PRACTITIONER

## 2021-03-26 PROCEDURE — 4004F PT TOBACCO SCREEN RCVD TLK: CPT | Performed by: NURSE PRACTITIONER

## 2021-03-26 PROCEDURE — G8427 DOCREV CUR MEDS BY ELIG CLIN: HCPCS | Performed by: NURSE PRACTITIONER

## 2021-03-26 PROCEDURE — 3017F COLORECTAL CA SCREEN DOC REV: CPT | Performed by: NURSE PRACTITIONER

## 2021-03-26 PROCEDURE — 99215 OFFICE O/P EST HI 40 MIN: CPT | Performed by: NURSE PRACTITIONER

## 2021-03-26 RX ORDER — MONTELUKAST SODIUM 10 MG/1
10 TABLET ORAL DAILY
Qty: 30 TABLET | Refills: 11 | Status: SHIPPED | OUTPATIENT
Start: 2021-03-26 | End: 2022-02-15 | Stop reason: SDUPTHER

## 2021-03-26 RX ORDER — FLUTICASONE PROPIONATE 250 UG/1
1 POWDER, METERED RESPIRATORY (INHALATION) DAILY
Qty: 1 EACH | Refills: 11 | Status: SHIPPED | OUTPATIENT
Start: 2021-03-26 | End: 2021-05-12 | Stop reason: ALTCHOICE

## 2021-03-26 ASSESSMENT — ENCOUNTER SYMPTOMS
DIARRHEA: 0
GASTROINTESTINAL NEGATIVE: 1
SHORTNESS OF BREATH: 1
NAUSEA: 0
STRIDOR: 0
WHEEZING: 1
BACK PAIN: 1
VOMITING: 0
CHEST TIGHTNESS: 0
EYES NEGATIVE: 1

## 2021-03-30 ENCOUNTER — TELEPHONE (OUTPATIENT)
Dept: PULMONOLOGY | Age: 61
End: 2021-03-30

## 2021-03-30 RX ORDER — AMITRIPTYLINE HYDROCHLORIDE 10 MG/1
10 TABLET, FILM COATED ORAL NIGHTLY
Qty: 30 TABLET | Refills: 5 | Status: SHIPPED | OUTPATIENT
Start: 2021-03-30 | End: 2021-05-03 | Stop reason: SDUPTHER

## 2021-03-30 NOTE — TELEPHONE ENCOUNTER
Meliton Heller is in need of a new refill for RX Amitriptyline 10 mg be sent to [facility] in [place]. He transferred from [facility] pharmacy to [facility] and his RX did not transfer. Please advise.

## 2021-04-07 DIAGNOSIS — R06.02 SHORTNESS OF BREATH: ICD-10-CM

## 2021-04-07 PROCEDURE — 36415 COLL VENOUS BLD VENIPUNCTURE: CPT | Performed by: NURSE PRACTITIONER

## 2021-04-08 ENCOUNTER — IMMUNIZATION (OUTPATIENT)
Dept: PRIMARY CARE CLINIC | Age: 61
End: 2021-04-08
Payer: MEDICARE

## 2021-04-08 DIAGNOSIS — G89.4 CHRONIC PAIN SYNDROME: ICD-10-CM

## 2021-04-08 PROCEDURE — 91300 COVID-19, PFIZER VACCINE 30MCG/0.3ML DOSE: CPT | Performed by: FAMILY MEDICINE

## 2021-04-08 PROCEDURE — 0002A COVID-19, PFIZER VACCINE 30MCG/0.3ML DOSE: CPT | Performed by: FAMILY MEDICINE

## 2021-04-08 RX ORDER — OXYCODONE AND ACETAMINOPHEN 10; 325 MG/1; MG/1
1 TABLET ORAL EVERY 8 HOURS PRN
Qty: 90 TABLET | Refills: 0 | Status: SHIPPED | OUTPATIENT
Start: 2021-04-08 | End: 2021-05-10 | Stop reason: SDUPTHER

## 2021-04-13 ENCOUNTER — OFFICE VISIT (OUTPATIENT)
Dept: UROLOGY | Age: 61
End: 2021-04-13
Payer: MEDICARE

## 2021-04-13 ENCOUNTER — OFFICE VISIT (OUTPATIENT)
Dept: CARDIOLOGY CLINIC | Age: 61
End: 2021-04-13
Payer: MEDICARE

## 2021-04-13 ENCOUNTER — TELEPHONE (OUTPATIENT)
Dept: CARDIOLOGY CLINIC | Age: 61
End: 2021-04-13

## 2021-04-13 VITALS
DIASTOLIC BLOOD PRESSURE: 70 MMHG | HEIGHT: 73 IN | BODY MASS INDEX: 41.75 KG/M2 | SYSTOLIC BLOOD PRESSURE: 118 MMHG | WEIGHT: 315 LBS

## 2021-04-13 VITALS
WEIGHT: 315 LBS | DIASTOLIC BLOOD PRESSURE: 80 MMHG | HEIGHT: 73 IN | SYSTOLIC BLOOD PRESSURE: 132 MMHG | BODY MASS INDEX: 41.75 KG/M2 | OXYGEN SATURATION: 94 % | HEART RATE: 73 BPM

## 2021-04-13 DIAGNOSIS — E66.01 OBESITY, CLASS III, BMI 40-49.9 (MORBID OBESITY) (HCC): ICD-10-CM

## 2021-04-13 DIAGNOSIS — R53.83 FATIGUE, UNSPECIFIED TYPE: ICD-10-CM

## 2021-04-13 DIAGNOSIS — G47.33 OSA (OBSTRUCTIVE SLEEP APNEA): ICD-10-CM

## 2021-04-13 DIAGNOSIS — I10 ESSENTIAL HYPERTENSION: ICD-10-CM

## 2021-04-13 DIAGNOSIS — R35.0 BENIGN PROSTATIC HYPERPLASIA WITH URINARY FREQUENCY: Primary | ICD-10-CM

## 2021-04-13 DIAGNOSIS — R06.09 DOE (DYSPNEA ON EXERTION): ICD-10-CM

## 2021-04-13 DIAGNOSIS — N40.1 BENIGN PROSTATIC HYPERPLASIA WITH URINARY FREQUENCY: Primary | ICD-10-CM

## 2021-04-13 DIAGNOSIS — I50.32 CHF (CONGESTIVE HEART FAILURE), NYHA CLASS II, CHRONIC, DIASTOLIC (HCC): Primary | ICD-10-CM

## 2021-04-13 DIAGNOSIS — R97.20 RISING PSA LEVEL: ICD-10-CM

## 2021-04-13 DIAGNOSIS — N50.812 LEFT TESTICULAR PAIN: ICD-10-CM

## 2021-04-13 LAB
BILIRUBIN URINE: NEGATIVE
BLOOD URINE, POC: NEGATIVE
CHARACTER, URINE: CLEAR
COLOR, URINE: YELLOW
GLUCOSE URINE: 500 MG/DL
KETONES, URINE: NEGATIVE
LEUKOCYTE CLUMPS, URINE: NEGATIVE
NITRITE, URINE: NEGATIVE
PH, URINE: 5.5 (ref 5–9)
POST VOID RESIDUAL (PVR): 188 ML
PROTEIN, URINE: 30 MG/DL
SPECIFIC GRAVITY, URINE: 1.02 (ref 1–1.03)
UROBILINOGEN, URINE: 0.2 EU/DL (ref 0–1)

## 2021-04-13 PROCEDURE — 3017F COLORECTAL CA SCREEN DOC REV: CPT | Performed by: NURSE PRACTITIONER

## 2021-04-13 PROCEDURE — 99214 OFFICE O/P EST MOD 30 MIN: CPT | Performed by: NURSE PRACTITIONER

## 2021-04-13 PROCEDURE — 3017F COLORECTAL CA SCREEN DOC REV: CPT | Performed by: UROLOGY

## 2021-04-13 PROCEDURE — G8417 CALC BMI ABV UP PARAM F/U: HCPCS | Performed by: NURSE PRACTITIONER

## 2021-04-13 PROCEDURE — 4004F PT TOBACCO SCREEN RCVD TLK: CPT | Performed by: UROLOGY

## 2021-04-13 PROCEDURE — 81003 URINALYSIS AUTO W/O SCOPE: CPT | Performed by: UROLOGY

## 2021-04-13 PROCEDURE — 51798 US URINE CAPACITY MEASURE: CPT | Performed by: UROLOGY

## 2021-04-13 PROCEDURE — G8427 DOCREV CUR MEDS BY ELIG CLIN: HCPCS | Performed by: NURSE PRACTITIONER

## 2021-04-13 PROCEDURE — 4004F PT TOBACCO SCREEN RCVD TLK: CPT | Performed by: NURSE PRACTITIONER

## 2021-04-13 PROCEDURE — G8417 CALC BMI ABV UP PARAM F/U: HCPCS | Performed by: UROLOGY

## 2021-04-13 PROCEDURE — G8427 DOCREV CUR MEDS BY ELIG CLIN: HCPCS | Performed by: UROLOGY

## 2021-04-13 PROCEDURE — 99214 OFFICE O/P EST MOD 30 MIN: CPT | Performed by: UROLOGY

## 2021-04-13 RX ORDER — TAMSULOSIN HYDROCHLORIDE 0.4 MG/1
0.4 CAPSULE ORAL NIGHTLY
Qty: 90 CAPSULE | Refills: 3 | Status: SHIPPED | OUTPATIENT
Start: 2021-04-13 | End: 2022-04-12 | Stop reason: SDUPTHER

## 2021-04-13 RX ORDER — OXYBUTYNIN CHLORIDE 10 MG/1
10 TABLET, EXTENDED RELEASE ORAL DAILY
Qty: 90 TABLET | Refills: 3 | Status: SHIPPED | OUTPATIENT
Start: 2021-04-13 | End: 2022-04-12 | Stop reason: SDUPTHER

## 2021-04-13 RX ORDER — SACUBITRIL AND VALSARTAN 24; 26 MG/1; MG/1
1 TABLET, FILM COATED ORAL 2 TIMES DAILY
Qty: 60 TABLET | Refills: 5 | Status: SHIPPED | OUTPATIENT
Start: 2021-04-13 | End: 2021-09-09 | Stop reason: ALTCHOICE

## 2021-04-13 RX ORDER — HYDRALAZINE HYDROCHLORIDE 100 MG/1
100 TABLET, FILM COATED ORAL EVERY 8 HOURS SCHEDULED
Qty: 270 TABLET | Refills: 3 | Status: SHIPPED | OUTPATIENT
Start: 2021-04-13 | End: 2021-06-14 | Stop reason: SDUPTHER

## 2021-04-13 ASSESSMENT — ENCOUNTER SYMPTOMS
SHORTNESS OF BREATH: 1
COUGH: 0
ABDOMINAL DISTENTION: 0

## 2021-04-13 NOTE — PATIENT INSTRUCTIONS
You may receive a survey regarding the care you received during your visit. Your input is valuable to us. We encourage you to complete and return your survey. We hope you will choose us in the future for your healthcare needs. Continue:  · Continue current medications  · Daily weights and record  · Fluid restriction of 2 Liters per day  · Limit sodium in diet to around 0903-2250 mg/day  · Monitor BP  · Activity as tolerated     Call the [facility] for any of the following symptoms: [phone]   Weight gain of 2-3 pounds in 1 day or 5 pounds in 1 week   Increased shortness of breath   Shortness of breath while laying down   Cough   Chest pain   Swelling in feet, ankles or legs   Tenderness or bloating in the abdomen   Fatigue    Decreased appetite or nausea    Confusion     Take extra Bumex today. Get blood work this week.

## 2021-04-13 NOTE — TELEPHONE ENCOUNTER
Entresto $47/month - check if pt wants to pay (he only pays $4/month for Losartan, assuming no) otherwise continue Losartan

## 2021-04-13 NOTE — TELEPHONE ENCOUNTER
Patient okay with trialing Entresto. Going to have labs done with PCP in 1 week. BMP ordered for 6 weeks.

## 2021-04-13 NOTE — PROGRESS NOTES
Heart Failure Clinic       Visit Date: 4/13/2021  Cardiologist:  Dr. Sawyer Bowman  Primary Care Physician: Dr. Jaida Arce, DO    Johnie Gunter is a 64 y.o. male who presents today for:  Chief Complaint   Patient presents with    Congestive Heart Failure       HPI:   Johnie Gunter is a 64 y.o. male who presents to the office for a follow up patient visit in the heart failure clinic. Accompanied by no one    TYPE HF: HFrecoveredEF (40-45% - improved to 55-60% 12/2020)  Loanne Begin, DM, LAURIE (CPAP), CKD Bernadene Alt), Partial colectomy (2017), never smoker (wife does)     Dry Wt:  ???     Hospitalization:  March 2021 - CHF - SOB. + CXR for Pulm congestion. BNP 1279. BP meds adjusted. Changed to Bumex. OV Nallu 11/19 - BP elevated, +CP, SOB  12/1 = LHC w/ Nallu = nonobstructive disease, elevated LVEDP - given Lasix 80 IV x 1  OV Pulmonary - 3/26 - PFTs = mild restriction likely 2/2 to obesity. NIOX slightly elevated indicating inflammation - started on Flovent. Concerns today: feeling about the same - still tires easily, AKINS. Has take break sometimes simply walking room/room in house. Feels very deconditioned = been very inactive since colectomy in 2017. Feels little more bloated today  CPAP - recently changed to BiPAP  Has been out of Clonidine for few days. Has been taking 2 tabs Hydralazine not 1.5 - was unable to split adequately.    BPs 130s at home  Diet: watching salt and fluid (keeps under 2L/day) = does admit snacks, grandkids always have candy    Patient has:  Chest Pain: no  SOB: AKINS  Orthopnea/PND: no  LAURIE: BIpap  Edema: no  Fatigue: yes  Abdominal bloating: yes  Cough: slight head cold  Appetite: good  Any extra diuretic use: no  Weight gain: no  Home weight: stable  Home blood pressure: stable     Past Medical History:   Diagnosis Date    Arthritis     Back problem     Constipation     Diabetes mellitus ([address]     Hypertension     Sleep apnea     has CPAP    Thyroid disease      Past Surgical History:   Procedure Laterality Date    ABDOMEN SURGERY      APPENDECTOMY      CARPAL TUNNEL RELEASE Bilateral     COLONOSCOPY  2017    Dr. Gayathri Arriaga, ESOPHAGUS     [address] [facility]      x3 surgeries with 14 repairs    KNEE SURGERY Right 1980's    cartilage    [place] EXPLORATORY OF ABDOMEN N/A 2/5/2018    ABDOMINAL EXPLORATION WITH LYSIS OF COMPLICATED ADHESIONS WITH AN EXTENDED RIGHT COLON RESECTION performed by Lino Lopez MD at [facility] History   Problem Relation Age of Onset    Cancer Mother         breast    Heart Disease Father         bladder, lung    Cancer Father     Stroke Brother     Heart Attack Brother     Prostate Cancer Brother     Diabetes Paternal Grandmother     Arthritis Brother     High Blood Pressure Neg Hx      Social History     Tobacco Use    Smoking status: Never Smoker    Smokeless tobacco: Current User     Types: Chew    Tobacco comment: quit pipe over 30 yrs ago   Substance Use Topics    Alcohol use: No     Alcohol/week: 0.0 standard drinks     Comment: quit     Current Outpatient Medications   Medication Sig Dispense Refill    oxybutynin (DITROPAN XL) 10 MG extended release tablet Take 1 tablet by mouth daily 90 tablet 3    tamsulosin (FLOMAX) 0.4 MG capsule Take 1 capsule by mouth nightly 90 capsule 3    hydrALAZINE (APRESOLINE) 100 MG tablet Take 1 tablet by mouth every 8 hours 270 tablet 3    sacubitril-valsartan (ENTRESTO) 24-26 MG per tablet Take 1 tablet by mouth 2 times daily 60 tablet 5    oxyCODONE-acetaminophen (PERCOCET)  MG per tablet Take 1 tablet by mouth every 8 hours as needed for Pain for up to 30 days. 90 tablet 0    amitriptyline (ELAVIL) 10 MG tablet Take 1 tablet by mouth nightly 30 tablet 5    FLOVENT DISKUS 250 MCG/BLIST AEPB inhaler Inhale 1 puff into the lungs daily Rinse mouth after its use.  1 each 11    montelukast (SINGULAIR) 10 MG tablet Take 1 tablet by mouth daily 30 tablet 11    gabapentin (NEURONTIN) 300 MG capsule Take 1 capsule by mouth nightly for 90 days. 90 capsule 0    spironolactone (ALDACTONE) 50 MG tablet Take 1 tablet by mouth 2 times daily 60 tablet 5    bumetanide (BUMEX) 2 MG tablet Take 1 tablet by mouth daily AND as directed by [facility] clinic 40 tablet 5    potassium chloride (MICRO-K) 10 MEQ extended release capsule Take 2 capsules by mouth daily 60 capsule 0    levothyroxine (SYNTHROID) 200 MCG tablet Take 1 tablet by mouth Daily 30 tablet 1    zolpidem (AMBIEN CR) 12.5 MG extended release tablet Take 12.5 mg by mouth nightly as needed.  amLODIPine (NORVASC) 10 MG tablet Take 10 mg by mouth daily      bismuth subsalicylate (PEPTO BISMOL) 262 MG/15ML suspension Take by mouth daily      glimepiride (AMARYL) 4 MG tablet Take 4 mg by mouth daily      losartan (COZAAR) 25 MG tablet Take 1 tablet by mouth daily 30 tablet 5    aspirin EC 81 MG EC tablet Take 1 tablet by mouth daily 90 tablet 1    rOPINIRole (REQUIP) 0.5 MG tablet Take 1 pill in afternoon and 2 pills at night. 90 tablet 2    carvedilol (COREG) 25 MG tablet Take 1 tablet by mouth 2 times daily 180 tablet 3    acetaminophen (TYLENOL) 500 MG tablet Take 1 tablet by mouth every 6 hours as needed for Pain 40 tablet 0    cyclobenzaprine (FLEXERIL) 10 MG tablet Take 10 mg by mouth 3 times daily as needed for Muscle spasms      albuterol (PROVENTIL HFA;VENTOLIN HFA) 108 (90 BASE) MCG/ACT inhaler Inhale 1 puff into the lungs every 6 hours as needed for Wheezing.  azelastine (ASTELIN) 137 MCG/SPRAY nasal spray 1 spray by Nasal route as needed. Use in each nostril as directed       No current facility-administered medications for this visit.       Allergies   Allergen Reactions    Shellfish-Derived Products Swelling    Pcn [Penicillins] Itching    Succinylcholine      Pseudocholinesterase Deficiency    Morphine Nausea And Vomiting     \"head swimming, skin crawling\"    Tape Shikha Burger Tape] Rash SUBJECTIVE:   Review of Systems   Constitutional: Positive for fatigue. Negative for activity change and appetite change. Respiratory: Positive for shortness of breath (AKINS). Negative for cough. Cardiovascular: Positive for leg swelling (scant). Negative for chest pain and palpitations. Gastrointestinal: Negative for abdominal distention. Neurological: Negative for weakness, light-headedness and headaches. Hematological: Negative for adenopathy. Psychiatric/Behavioral: Negative for sleep disturbance. OBJECTIVE:   Today's Vitals:  /80   Pulse 73   Ht 6' 1\" (1.854 m)   Wt (!) 357 lb (161.9 kg)   SpO2 94%   BMI 47.10 kg/m²     Physical Exam  Vitals signs reviewed. Constitutional:       General: He is not in acute distress. Appearance: Normal appearance. He is well-developed. He is obese. He is not diaphoretic. HENT:      Head: Normocephalic and atraumatic. Eyes:      Conjunctiva/sclera: Conjunctivae normal.   Neck:      Musculoskeletal: Normal range of motion and neck supple. Comments: No JVD  Cardiovascular:      Rate and Rhythm: Normal rate and regular rhythm. Heart sounds: Normal heart sounds. No murmur. Pulmonary:      Effort: Pulmonary effort is normal. No respiratory distress. Breath sounds: Normal breath sounds. No wheezing or rales. Abdominal:      General: Bowel sounds are normal. There is no distension. Palpations: Abdomen is soft. Tenderness: There is no abdominal tenderness. Musculoskeletal: Normal range of motion. Right lower leg: Edema (scant ankle) present. Left lower leg: Edema (scant ankle) present. Skin:     General: Skin is warm and dry. Capillary Refill: Capillary refill takes less than 2 seconds. Neurological:      Mental Status: He is alert and oriented to person, place, and time.       Coordination: Coordination normal.   Psychiatric:         Behavior: Behavior normal.         Wt Readings from Last 3 Encounters:   04/13/21 (!) 357 lb (161.9 kg)   04/13/21 (!) 358 lb (162.4 kg)   03/26/21 (!) 356 lb (161.5 kg)     BP Readings from Last 3 Encounters:   04/13/21 132/80   04/13/21 118/70   03/26/21 128/82     Pulse Readings from Last 3 Encounters:   04/13/21 73   03/26/21 75   03/15/21 65     Body mass index is 47.1 kg/m². ECHO:    Summary   Technically difficult examination.   Left ventricular size and systolic function is normal. Ejection fraction   was estimated at 55-60%. LV wall thickness is within normal limits and   there are no obvious wall motion abnormalities.      Signature      ----------------------------------------------------------------   Electronically signed by Dorian Fenton MD (Interpreting   physician) on 12/15/2020 at 03:22 PM   ----------------------------------------------------------------     ECHO   Summary   Technically difficult examination.   Left ventricular size is normal and systolic function is mildly reduced.   Ejection fraction was estimated at 40-45%. LV wall thickness is within   normal limits.   The right ventricular size appears normal with normal systolic function   and wall thickness.      Signature      ----------------------------------------------------------------   Electronically signed by Dorian Fenton MD (Interpreting   physician) on 04/26/2019 at 05:05 PM   ----------------------------------------------------------------     2013 EF 55-60%        CATH/STRESS:   RIGHT HEART CATHETERIZATION:  1.  RA is 25.  2.  RV is 80/23, 27.  3.  PA is 82/44, 59.  4.  Wedge pressure is 35.  5.  LV is 188/28, 36.  6.  AO was 205/126, 160.  7.  AO saturation is 91%. 8.  PA saturation is 63%. 9.  Cardiac output by Cherelle method is 5.9. 10.  Cardiac index is 2.1.     CORONARY ANATOMY:  1.  Right coronary artery is a dominant vessel.  The vessel is quite  large and has mild luminal irregularities in the proximal, mid, and  distal portions of the vessel; otherwise, it is patent.   2.  Left main is patent, gives rise to LAD and left circumflex arteries. 3.  Left circumflex artery is patent without any significant disease. 4.  LAD is patent in the proximal portion, mid portion there is a 30% to  40% stenosis followed by mild luminal irregularities in the distal  portion of the LAD. 5.  LV gram was not performed due to renal dysfunction.     The patient tolerated the procedure well without any significant issues  or complications.  Hemostasis was achieved with a Vasc Band device.     SUMMARY:  1.  Elevated right heart pressures with elevated LVEPD. 2.  Patent coronaries.     RECOMMENDATIONS:  1.  IV diuresis.  (Lasix 80 IVP x 1)  2.  Monitor electrolytes. 3.  Optimize heart failure therapy. 4.  Weight loss advised. 5.  Optimize sleep apnea therapies.   6.  Follow up in clinic in one to two weeks to evaluate symptoms  further.     Carmenza Shields MD     D: 12/06/2020 21:57:38       T: 12/06/2020 23:06:51     [email or web address]  Job#: [id number]     Doc#: 22      Results reviewed:  BNP: No results found for: BNP  CBC:   Lab Results   Component Value Date    WBC 7.3 03/03/2021    RBC 4.41 03/03/2021    RBC 5.15 08/12/2011    HGB 12.9 03/03/2021    HCT 39.9 03/03/2021     03/03/2021     CMP:    Lab Results   Component Value Date     03/03/2021    K 3.7 03/03/2021    K 2.9 12/01/2020     03/03/2021    CO2 30 03/03/2021    BUN 12 03/03/2021    CREATININE 1.2 03/03/2021    LABGLOM 62 03/03/2021    GLUCOSE 102 03/03/2021    CALCIUM 8.9 03/03/2021     Hepatic Function Panel:    Lab Results   Component Value Date    ALKPHOS 67 03/02/2021    ALT 23 03/02/2021    AST 16 03/02/2021    PROT 7.5 03/02/2021    BILITOT 0.7 03/02/2021    BILIDIR <0.2 03/02/2021    LABALBU 3.6 03/02/2021     Magnesium:    Lab Results   Component Value Date    MG 2.1 03/03/2021     PT/INR:    Lab Results   Component Value Date    INR 1.06 12/01/2020     Lipids:    Lab Results   Component Value Date    TRIG 127 03/03/2021    HDL 27 03/03/2021    LDLCALC 77 03/03/2021       ASSESSMENT AND PLAN:   The patient's condition/symptoms are Stable. Diagnosis Orders   1. CHF (congestive heart failure), NYHA class II/III, chronic, diastolic (HCC)  Basic Metabolic Panel    Brain Natriuretic Peptide   2. Essential hypertension     3. LAURIE (obstructive sleep apnea)     4. Obesity, Class III, BMI 40-49.9 (morbid obesity) (HCC)       Continue:  GDMT:   ACE/ARB/ARNI - Losartan 25/day - will check coverage and change to Entresto  (new indication for HFpEF)   BB - Coreg 25 BID   Diuretic - Bumex 2/day, Kcl 20/day  AA - Aldactone 50 BID  SGLT2 -  No   Vasodilator - Hydralazine 75 TID - has been taking 100 TID (will continue - new RX sent)  Continue Current medications:  Clonidine (has been out - will STOP)  Stable - appears Euvolemic on exam  BMP, BNP this week w/ labs from PCP  [employer] approval still in process  Wt loss - discussed Wt management group - he will continue to try dieting  Increase activity   Saw Pulmonary - mild restriction 2/2 obesity - NIOX mild elevated (indicating inflammation) - started on Flovent. Continue daily wts and BP check - call if BP > 140/90  Entresto $47/month - will check if pt wants to pay otherwise continue Losartan  F/U w/ Nallu in May   F/U in clinic in Sept or sooner if needed. Total visit time of 40 minutes has been spent with patient on education of symptoms, management, medication, and plan of care; as well as review of chart: labs, ECHO, radiology reports, etc.   I personally spent more then 50% of the appt time face to face with the patient. · Daily weights  · Fluid restriction of 2 Liters per day  · Limit sodium in diet to around 7793-1246 mg/day  · Monitor BP  · Activity as tolerated     Patient was instructed to call the [facility] for any changes in symptoms as noted in AVS.      Return in about 5 months (around 9/13/2021).  or sooner if needed     Patient given educational

## 2021-04-15 ENCOUNTER — HOSPITAL ENCOUNTER (OUTPATIENT)
Dept: GENERAL RADIOLOGY | Age: 61
Discharge: HOME | End: 2021-04-15
Payer: MEDICARE

## 2021-04-15 ENCOUNTER — HOSPITAL ENCOUNTER (OUTPATIENT)
Age: 61
Discharge: HOME | End: 2021-04-15
Payer: MEDICARE

## 2021-04-15 DIAGNOSIS — G89.29 CHRONIC RIGHT SHOULDER PAIN: ICD-10-CM

## 2021-04-15 DIAGNOSIS — I50.32 CHF (CONGESTIVE HEART FAILURE), NYHA CLASS II, CHRONIC, DIASTOLIC (HCC): ICD-10-CM

## 2021-04-15 DIAGNOSIS — M25.511 CHRONIC RIGHT SHOULDER PAIN: ICD-10-CM

## 2021-04-15 DIAGNOSIS — G89.29 CHRONIC PAIN OF RIGHT KNEE: ICD-10-CM

## 2021-04-15 DIAGNOSIS — M25.561 CHRONIC PAIN OF RIGHT KNEE: ICD-10-CM

## 2021-04-15 DIAGNOSIS — R97.20 RISING PSA LEVEL: ICD-10-CM

## 2021-04-15 LAB
BASOPHILS # BLD: 0.9 %
BASOPHILS ABSOLUTE: 0.1 THOU/MM3 (ref 0–0.1)
CREATININE, URINE: 167.7 MG/DL
EOSINOPHIL # BLD: 3.6 %
EOSINOPHILS ABSOLUTE: 0.3 THOU/MM3 (ref 0–0.4)
ERYTHROCYTE [DISTWIDTH] IN BLOOD BY AUTOMATED COUNT: 14.7 % (ref 11.5–14.5)
ERYTHROCYTE [DISTWIDTH] IN BLOOD BY AUTOMATED COUNT: 49.1 FL (ref 35–45)
HCT VFR BLD CALC: 47 % (ref 42–52)
HEMOGLOBIN: 14.9 GM/DL (ref 14–18)
IMMATURE GRANS (ABS): 0.04 THOU/MM3 (ref 0–0.07)
IMMATURE GRANULOCYTES: 0.5 %
LYMPHOCYTES # BLD: 14.5 %
LYMPHOCYTES ABSOLUTE: 1.2 THOU/MM3 (ref 1–4.8)
MCH RBC QN AUTO: 28.9 PG (ref 26–33)
MCHC RBC AUTO-ENTMCNC: 31.7 GM/DL (ref 32.2–35.5)
MCV RBC AUTO: 91.1 FL (ref 80–94)
MICROALBUMIN UR-MCNC: 11.67 MG/DL
MICROALBUMIN/CREAT UR-RTO: 70 MG/G (ref 0–30)
MONOCYTES # BLD: 10.6 %
MONOCYTES ABSOLUTE: 0.9 THOU/MM3 (ref 0.4–1.3)
NUCLEATED RED BLOOD CELLS: 0 /100 WBC
PLATELET # BLD: 285 THOU/MM3 (ref 130–400)
PMV BLD AUTO: 10 FL (ref 9.4–12.4)
RBC # BLD: 5.16 MILL/MM3 (ref 4.7–6.1)
SEG NEUTROPHILS: 69.9 %
SEGMENTED NEUTROPHILS ABSOLUTE COUNT: 5.7 THOU/MM3 (ref 1.8–7.7)
WBC # BLD: 8.1 THOU/MM3 (ref 4.8–10.8)

## 2021-04-15 PROCEDURE — 84153 ASSAY OF PSA TOTAL: CPT

## 2021-04-15 PROCEDURE — 36415 COLL VENOUS BLD VENIPUNCTURE: CPT

## 2021-04-15 PROCEDURE — 80061 LIPID PANEL: CPT

## 2021-04-15 PROCEDURE — 84439 ASSAY OF FREE THYROXINE: CPT

## 2021-04-15 PROCEDURE — 73562 X-RAY EXAM OF KNEE 3: CPT

## 2021-04-15 PROCEDURE — 84443 ASSAY THYROID STIM HORMONE: CPT

## 2021-04-15 PROCEDURE — 80053 COMPREHEN METABOLIC PANEL: CPT

## 2021-04-15 PROCEDURE — 83036 HEMOGLOBIN GLYCOSYLATED A1C: CPT

## 2021-04-15 PROCEDURE — 83880 ASSAY OF NATRIURETIC PEPTIDE: CPT

## 2021-04-15 PROCEDURE — 82043 UR ALBUMIN QUANTITATIVE: CPT

## 2021-04-15 PROCEDURE — 73030 X-RAY EXAM OF SHOULDER: CPT

## 2021-04-15 PROCEDURE — 85025 COMPLETE CBC W/AUTO DIFF WBC: CPT

## 2021-04-16 LAB
ALBUMIN SERPL-MCNC: 4.4 G/DL (ref 3.5–5.1)
ALP BLD-CCNC: 76 U/L (ref 38–126)
ALT SERPL-CCNC: 47 U/L (ref 11–66)
ANION GAP SERPL CALCULATED.3IONS-SCNC: 10 MEQ/L (ref 8–16)
AST SERPL-CCNC: 41 U/L (ref 5–40)
AVERAGE GLUCOSE: 198 MG/DL (ref 70–126)
BILIRUB SERPL-MCNC: 0.5 MG/DL (ref 0.3–1.2)
BUN BLDV-MCNC: 18 MG/DL (ref 7–22)
CALCIUM SERPL-MCNC: 9.9 MG/DL (ref 8.5–10.5)
CHLORIDE BLD-SCNC: 99 MEQ/L (ref 98–111)
CHOLESTEROL, TOTAL: 147 MG/DL (ref 100–199)
CO2: 26 MEQ/L (ref 23–33)
CREAT SERPL-MCNC: 1.1 MG/DL (ref 0.4–1.2)
GFR SERPL CREATININE-BSD FRML MDRD: 69 ML/MIN/1.73M2
GLUCOSE BLD-MCNC: 212 MG/DL (ref 70–108)
HBA1C MFR BLD: 8.6 % (ref 4.4–6.4)
HDLC SERPL-MCNC: 30 MG/DL
LDL CHOLESTEROL CALCULATED: 76 MG/DL
POTASSIUM SERPL-SCNC: 4.7 MEQ/L (ref 3.5–5.2)
PRO-BNP: 46.5 PG/ML (ref 0–900)
PROSTATE SPECIFIC ANTIGEN: 0.82 NG/ML (ref 0–1)
SODIUM BLD-SCNC: 135 MEQ/L (ref 135–145)
T4 FREE: 1.28 NG/DL (ref 0.93–1.76)
TOTAL PROTEIN: 8.3 G/DL (ref 6.1–8)
TRIGL SERPL-MCNC: 207 MG/DL (ref 0–199)
TSH SERPL DL<=0.05 MIU/L-ACNC: 3.09 UIU/ML (ref 0.4–4.2)

## 2021-04-19 RX ORDER — ACETAMINOPHEN/DIPHENHYDRAMINE 500MG-25MG
TABLET ORAL
Qty: 90 TABLET | Refills: 0 | Status: SHIPPED | OUTPATIENT
Start: 2021-04-19 | End: 2021-09-10 | Stop reason: ALTCHOICE

## 2021-04-20 NOTE — TELEPHONE ENCOUNTER
Still have not received denial from [facility] and spoke with Edgar Lozano  Requested denial to be faxed to office   Case ID #[id number]

## 2021-05-03 ENCOUNTER — OFFICE VISIT (OUTPATIENT)
Dept: PULMONOLOGY | Age: 61
End: 2021-05-03
Payer: MEDICARE

## 2021-05-03 VITALS
SYSTOLIC BLOOD PRESSURE: 130 MMHG | TEMPERATURE: 97.4 F | OXYGEN SATURATION: 98 % | HEART RATE: 70 BPM | BODY MASS INDEX: 41.75 KG/M2 | WEIGHT: 315 LBS | HEIGHT: 73 IN | DIASTOLIC BLOOD PRESSURE: 62 MMHG

## 2021-05-03 DIAGNOSIS — E66.01 MORBID OBESITY WITH BMI OF 45.0-49.9, ADULT (HCC): ICD-10-CM

## 2021-05-03 DIAGNOSIS — G47.33 OSA TREATED WITH BIPAP: Primary | ICD-10-CM

## 2021-05-03 PROCEDURE — 3017F COLORECTAL CA SCREEN DOC REV: CPT | Performed by: PHYSICIAN ASSISTANT

## 2021-05-03 PROCEDURE — 99214 OFFICE O/P EST MOD 30 MIN: CPT | Performed by: PHYSICIAN ASSISTANT

## 2021-05-03 PROCEDURE — G8417 CALC BMI ABV UP PARAM F/U: HCPCS | Performed by: PHYSICIAN ASSISTANT

## 2021-05-03 PROCEDURE — G8427 DOCREV CUR MEDS BY ELIG CLIN: HCPCS | Performed by: PHYSICIAN ASSISTANT

## 2021-05-03 PROCEDURE — 1036F TOBACCO NON-USER: CPT | Performed by: PHYSICIAN ASSISTANT

## 2021-05-03 RX ORDER — ROPINIROLE 0.5 MG/1
TABLET, FILM COATED ORAL
Qty: 90 TABLET | Refills: 2 | Status: SHIPPED | OUTPATIENT
Start: 2021-05-03 | End: 2021-09-29 | Stop reason: SDUPTHER

## 2021-05-03 RX ORDER — AMITRIPTYLINE HYDROCHLORIDE 10 MG/1
20 TABLET, FILM COATED ORAL NIGHTLY
Qty: 60 TABLET | Refills: 5 | Status: SHIPPED | OUTPATIENT
Start: 2021-05-03 | End: 2021-09-29

## 2021-05-03 ASSESSMENT — ENCOUNTER SYMPTOMS
ALLERGIC/IMMUNOLOGIC NEGATIVE: 1
EYES NEGATIVE: 1
COUGH: 0
DIARRHEA: 0
BACK PAIN: 1
NAUSEA: 0
SHORTNESS OF BREATH: 0
WHEEZING: 0
CHEST TIGHTNESS: 0
STRIDOR: 0

## 2021-05-03 NOTE — PROGRESS NOTES
[facility]      Bonnie Wolf         [id number]  5/3/2021   Chief Complaint   Patient presents with    Follow-up     LAURIE 6 month follow up with download         Pt of Dr. Danyel Castellano Pt    PAP Download:   Chelsi Eldridge or initial AHI: 49.0     Date of initial study: 9/12/2015      Compliant  100%     Noncompliant 0 %     PAP Type Aircurve 10 VautoLevel  18/14   Avg Hrs/Day 8 hr 29 min  AHI: 0.4   Recorded compliance dates , 3/30/2021  to 4/28/2021   Machine/Mfg:   [x] ResMed    [] Respironics/Dreamstation   Interface:   [] Nasal    [] Nasal pillows   [x] FFM      Provider:      [x] -HMYENNI     []Seferino     [] [employer]    [] Rosa Guillermo    [] [place]               [] [employer]      [] [employer]    [] [address]      [] Other    Neck Size: 20.75  Mallampati Mallampati 4  ESS:    SAQLI:     Here is a scan of the most recent download:              Presentation:   Mateo Abdalla presents for sleep medicine follow up for obstructive sleep apnea  Since the last visit, Mateo Abdalla is doing well with bipap. He needed a replacement PAP and was sent for PSG secondary to insurance. He is now on bipap and doing well. He is opening his mouth at night. He is not sleeping well secondary to pain. Following with pain management. RLS is well controlled with Requip . Elavil not helpful. He is still struggling with insomnia. Equipment issues: The pressure is  acceptable, the mask is acceptable     Sleep issues:  Do you feel better? Yes  More rested? Yes   Better concentration? yes    Progress History:   Since last visit any new medical issues? No  New ER or hospital visits? No  Any new or changes in medicines? No  Any new sleep medicines? No    Review of Systems -   Review of Systems   Constitutional: Negative for activity change, appetite change, chills and fever. HENT: Negative for congestion and postnasal drip. Eyes: Negative.     Respiratory: Negative for cough, chest tightness, shortness of breath, wheezing and stridor. Cardiovascular: Negative for chest pain and leg swelling. Gastrointestinal: Negative for diarrhea and nausea. Endocrine: Negative. Genitourinary: Negative. Musculoskeletal: Positive for arthralgias and back pain. Skin: Negative. Allergic/Immunologic: Negative. Neurological: Negative. Negative for dizziness and light-headedness. Psychiatric/Behavioral: Negative. All other systems reviewed and are negative. Physical Exam:    BMI:  Body mass index is 46.94 kg/m². Wt Readings from Last 3 Encounters:   05/03/21 (!) 355 lb 12.8 oz (161.4 kg)   04/13/21 (!) 357 lb (161.9 kg)   04/13/21 (!) 358 lb (162.4 kg)     Weight stable / unchanged  Vitals: /62 (Site: Right Upper Arm, Position: Sitting, Cuff Size: Large Adult)   Pulse 70   Temp 97.4 °F (36.3 °C) (Temporal)   Ht 6' 1\" (1.854 m)   Wt (!) 355 lb 12.8 oz (161.4 kg)   SpO2 98% Comment: Room air at rest  BMI 46.94 kg/m²       Physical Exam  Constitutional:       Appearance: He is well-developed. HENT:      Head: Normocephalic and atraumatic. Right Ear: External ear normal.      Left Ear: External ear normal.   Eyes:      Conjunctiva/sclera: Conjunctivae normal.      Pupils: Pupils are equal, round, and reactive to light. Neck:      Musculoskeletal: Normal range of motion and neck supple. Cardiovascular:      Rate and Rhythm: Normal rate and regular rhythm. Heart sounds: Normal heart sounds. Pulmonary:      Effort: Pulmonary effort is normal.      Breath sounds: Normal breath sounds. Musculoskeletal: Normal range of motion. Skin:     General: Skin is warm and dry. Neurological:      Mental Status: He is alert and oriented to person, place, and time. Psychiatric:         Behavior: Behavior normal.         Thought Content: Thought content normal.         Judgment: Judgment normal.           ASSESSMENT/DIAGNOSIS     Diagnosis Orders   1. LAURIE treated with BiPAP     2.  Morbid obesity with BMI of 45.0-49.9, adult [facility])              Plan   Do you need any equipment today? Chin strap  - will increase Elavil to try to improve insomnia, but mostly a pain issue  - Download reviewed and discussed with patient  - He  was advised to continue current positive airway pressure therapy with above described pressure. - He  advised to keep good compliance with current recommended pressure to get optimal results and clinical improvement  - Recommend 7-9 hours of sleep with PAP  - He was advised to call [employer] company regarding supplies if needed.   -He call my office for earlier appointment if needed for worsening of sleep symptoms.   - He was instructed on weight loss  - Jamee Aramis was educated about my impression and plan. Patient verbalizesunderstanding.   We will see Angelica Billingsley back in: 3 months with download    Information added by my medical assistant/LPN was reviewed today         Gertrudis Freedman PA-C, MPAS  5/3/2021

## 2021-05-05 ENCOUNTER — HOSPITAL ENCOUNTER (OUTPATIENT)
Dept: CT IMAGING | Age: 61
Discharge: HOME | End: 2021-05-05
Payer: MEDICARE

## 2021-05-05 DIAGNOSIS — R91.8 GROUND GLASS OPACITY PRESENT ON IMAGING OF LUNG: ICD-10-CM

## 2021-05-05 PROCEDURE — 71250 CT THORAX DX C-: CPT

## 2021-05-10 DIAGNOSIS — G89.4 CHRONIC PAIN SYNDROME: ICD-10-CM

## 2021-05-10 RX ORDER — OXYCODONE AND ACETAMINOPHEN 10; 325 MG/1; MG/1
1 TABLET ORAL EVERY 8 HOURS PRN
Qty: 90 TABLET | Refills: 0 | Status: SHIPPED | OUTPATIENT
Start: 2021-05-10 | End: 2021-06-10 | Stop reason: SDUPTHER

## 2021-05-10 NOTE — TELEPHONE ENCOUNTER
OARRS reviewed. UDS: + for  Oxycodone flexeril ambien consistent. Last seen: 3/22/2021.  Follow-up:   Future Appointments   Date Time Provider Ismael Haas   5/12/2021 10:00 AM SAHIL Avalos CNP [facility] [address] [place] [address] Road   5/24/2021  9:00 AM SAHIL Ochoa CNP [facility] [address]   5/24/2021  9:45 AM Carmela Donaldson MD [facility]   7/8/2021  1:30 PM Carmela Donaldson MD [facility] [address]   7/23/2021 11:40 AM Nancy Michel MD [facility]. [facility]   8/2/2021  9:15 AM Faye Kuhn PA-C [address]   9/9/2021 11:30 AM SAHIL Webb CNP [facility]F [address]   4/12/2022 12:45 PM Dinora Scherer  [facility]

## 2021-05-12 ENCOUNTER — HOSPITAL ENCOUNTER (OUTPATIENT)
Dept: GENERAL RADIOLOGY | Age: 61
Discharge: HOME | End: 2021-05-12
Payer: MEDICARE

## 2021-05-12 ENCOUNTER — HOSPITAL ENCOUNTER (OUTPATIENT)
Age: 61
Discharge: HOME | End: 2021-05-12
Payer: MEDICARE

## 2021-05-12 ENCOUNTER — OFFICE VISIT (OUTPATIENT)
Dept: PULMONOLOGY | Age: 61
End: 2021-05-12
Payer: MEDICARE

## 2021-05-12 VITALS
TEMPERATURE: 97.9 F | DIASTOLIC BLOOD PRESSURE: 62 MMHG | OXYGEN SATURATION: 98 % | HEART RATE: 75 BPM | BODY MASS INDEX: 41.75 KG/M2 | WEIGHT: 315 LBS | SYSTOLIC BLOOD PRESSURE: 118 MMHG | HEIGHT: 73 IN

## 2021-05-12 DIAGNOSIS — R07.89 CHEST DISCOMFORT: ICD-10-CM

## 2021-05-12 DIAGNOSIS — J45.20 MILD INTERMITTENT ASTHMA WITHOUT COMPLICATION: Primary | ICD-10-CM

## 2021-05-12 PROCEDURE — G8417 CALC BMI ABV UP PARAM F/U: HCPCS | Performed by: NURSE PRACTITIONER

## 2021-05-12 PROCEDURE — 95012 NITRIC OXIDE EXP GAS DETER: CPT | Performed by: NURSE PRACTITIONER

## 2021-05-12 PROCEDURE — G8427 DOCREV CUR MEDS BY ELIG CLIN: HCPCS | Performed by: NURSE PRACTITIONER

## 2021-05-12 PROCEDURE — 1036F TOBACCO NON-USER: CPT | Performed by: NURSE PRACTITIONER

## 2021-05-12 PROCEDURE — 99214 OFFICE O/P EST MOD 30 MIN: CPT | Performed by: NURSE PRACTITIONER

## 2021-05-12 PROCEDURE — 71046 X-RAY EXAM CHEST 2 VIEWS: CPT

## 2021-05-12 PROCEDURE — 3017F COLORECTAL CA SCREEN DOC REV: CPT | Performed by: NURSE PRACTITIONER

## 2021-05-12 RX ORDER — PREDNISONE 20 MG/1
40 TABLET ORAL DAILY
Qty: 10 TABLET | Refills: 0 | Status: SHIPPED | OUTPATIENT
Start: 2021-05-12 | End: 2021-05-17

## 2021-05-12 ASSESSMENT — ENCOUNTER SYMPTOMS
CHEST TIGHTNESS: 0
COUGH: 1
VOMITING: 0
ALLERGIC/IMMUNOLOGIC NEGATIVE: 1
BACK PAIN: 1
NAUSEA: 0
WHEEZING: 0
GASTROINTESTINAL NEGATIVE: 1
DIARRHEA: 0
EYES NEGATIVE: 1
STRIDOR: 0
SHORTNESS OF BREATH: 1

## 2021-05-12 NOTE — RESULT ENCOUNTER NOTE
Called and informed patient about cxr, and that Edd Gloria called and spoke to Jose Bonilla at [facility] and they are going to review his chart.

## 2021-05-12 NOTE — PROGRESS NOTES
[facility]    Patient: Natalia Dillon, 64 y.o.   : 1960  2021        Subjective     Chief Complaint   Patient presents with    Follow-up     6 week f/u medicaiont check,ct chest 21, a1a, niox        HPI  Nakia Tapia is here for follow up for SOB. PFT done recently showing RLD most likely 2/2 obesity. Patient also with mild, intermittent asthma  NIOX last visit elevated at 33- patient started on Flovent- patient also has CHRISSIE for rescue- NIOX still elevated today 34 (33)- will switch to LABA/LAMA and stop Flovent   LAURIE on BiPAP and compliant with therapy   Sharp LL chest on inspiration only- no recent CXR to review  Follows with [facility] sent message about CP and feeling abdominal distention   SOB slightly better- but still using CHRISSIE at least 2xs/day  Never a smoker but around a lot of second hand tobacco smoking   Covid vaccinations done x 2  CT scan reviewed- infiltrates resolving nicely but patient still with LLL discomfort on inpiration- no s/s of infection today ? Some left pleurisy vs left effusion - don't feel this is infectious at this time but discussed start ATB if he starts feeling ill     Progress History:   Since last visit any new medical issues? No  New ER or hospital visits? No  Any new or changes in medicines? No  Using inhalers? Yes Flovent and CHRISSIE  Are they helpful?  Yes   Flu vaccine UTD  Pneumonia vaccine UTD  Past Medical hx   PMH:  Past Medical History:   Diagnosis Date    Arthritis     Back problem     Constipation     Diabetes mellitus ([facility]     Hypertension     Sleep apnea     has CPAP    Thyroid disease      SURGICAL HISTORY:  Past Surgical History:   Procedure Laterality Date    ABDOMEN SURGERY      APPENDECTOMY      CARPAL TUNNEL RELEASE Bilateral     COLONOSCOPY  2017    Dr. Cem Tapia, ESOPHAGUS     [address]      x3 surgeries with 14 repairs    KNEE SURGERY Right     cartilage    ID EXPLORATORY OF ABDOMEN N/A 2/5/2018    ABDOMINAL EXPLORATION WITH LYSIS OF COMPLICATED ADHESIONS WITH AN EXTENDED RIGHT COLON RESECTION performed by López James MD at [address] [place] [address] Road:  Social History     Tobacco Use    Smoking status: Never Smoker    Smokeless tobacco: Former User     Types: Chew    Tobacco comment: quit pipe over 30 yrs ago   Substance Use Topics    Alcohol use: No     Alcohol/week: 0.0 standard drinks     Comment: quit    Drug use: No     ALLERGIES:  Allergies   Allergen Reactions    Shellfish-Derived Products Swelling    Pcn [Penicillins] Itching    Succinylcholine      Pseudocholinesterase Deficiency    Morphine Nausea And Vomiting     \"head swimming, skin crawling\"    Tape [Adhesive Tape] Rash     FAMILY HISTORY:  Family History   Problem Relation Age of Onset    Cancer Mother         breast    Heart Disease Father         bladder, lung    Cancer Father     Stroke Brother     Heart Attack Brother     Prostate Cancer Brother     Diabetes Paternal Grandmother     Arthritis Brother     High Blood Pressure Neg Hx      CURRENT MEDICATIONS:  Current Outpatient Medications   Medication Sig Dispense Refill    predniSONE (DELTASONE) 20 MG tablet Take 2 tablets by mouth daily for 5 days 10 tablet 0    Fluticasone furoate-vilanterol (BREO ELLIPTA) 200-25 MCG/INH AEPB inhaler Inhale 1 puff into the lungs daily 3 each 3    oxyCODONE-acetaminophen (PERCOCET)  MG per tablet Take 1 tablet by mouth every 8 hours as needed for Pain for up to 30 days. 90 tablet 0    rOPINIRole (REQUIP) 0.5 MG tablet Take 1 pill in afternoon and 2 pills at night.  90 tablet 2    amitriptyline (ELAVIL) 10 MG tablet Take 2 tablets by mouth nightly 60 tablet 5    RA ASPIRIN EC 81 MG EC tablet take 1 tablet by mouth once daily 90 tablet 0    oxybutynin (DITROPAN XL) 10 MG extended release tablet Take 1 tablet by mouth daily 90 tablet 3    tamsulosin (FLOMAX) 0.4 MG capsule Take 1 capsule by mouth nightly 90 capsule 3    hydrALAZINE (APRESOLINE) 100 MG tablet Take 1 tablet by mouth every 8 hours 270 tablet 3    sacubitril-valsartan (ENTRESTO) 24-26 MG per tablet Take 1 tablet by mouth 2 times daily 60 tablet 5    montelukast (SINGULAIR) 10 MG tablet Take 1 tablet by mouth daily 30 tablet 11    gabapentin (NEURONTIN) 300 MG capsule Take 1 capsule by mouth nightly for 90 days. 90 capsule 0    spironolactone (ALDACTONE) 50 MG tablet Take 1 tablet by mouth 2 times daily 60 tablet 5    bumetanide (BUMEX) 2 MG tablet Take 1 tablet by mouth daily AND as directed by CHF clinic 40 tablet 5    potassium chloride (MICRO-K) 10 MEQ extended release capsule Take 2 capsules by mouth daily 60 capsule 0    levothyroxine (SYNTHROID) 200 MCG tablet Take 1 tablet by mouth Daily 30 tablet 1    amLODIPine (NORVASC) 10 MG tablet Take 10 mg by mouth daily      bismuth subsalicylate (PEPTO BISMOL) 262 MG/15ML suspension Take by mouth daily      glimepiride (AMARYL) 4 MG tablet Take 4 mg by mouth daily      losartan (COZAAR) 25 MG tablet Take 1 tablet by mouth daily 30 tablet 5    carvedilol (COREG) 25 MG tablet Take 1 tablet by mouth 2 times daily 180 tablet 3    acetaminophen (TYLENOL) 500 MG tablet Take 1 tablet by mouth every 6 hours as needed for Pain 40 tablet 0    cyclobenzaprine (FLEXERIL) 10 MG tablet Take 10 mg by mouth 3 times daily as needed for Muscle spasms      albuterol (PROVENTIL HFA;VENTOLIN HFA) 108 (90 BASE) MCG/ACT inhaler Inhale 1 puff into the lungs every 6 hours as needed for Wheezing.  azelastine (ASTELIN) 137 MCG/SPRAY nasal spray 1 spray by Nasal route as needed. Use in each nostril as directed       No current facility-administered medications for this visit. ROS   Review of Systems   Constitutional: Negative. Negative for chills, fever and unexpected weight change. HENT: Negative. Eyes: Negative.     Respiratory: Positive for cough (occasional productive ) and shortness of breath (mainly with exertion). Negative for chest tightness, wheezing and stridor. Cardiovascular: Positive for chest pain (LLL) and leg swelling. Gastrointestinal: Negative. Negative for diarrhea, nausea and vomiting. Endocrine: Negative. Genitourinary: Negative. Negative for dysuria. Musculoskeletal: Positive for arthralgias and back pain. Skin: Negative. Allergic/Immunologic: Negative. Neurological: Negative. Hematological: Negative. Psychiatric/Behavioral: Negative. Physical exam   /62 (Site: Left Lower Arm, Position: Sitting, Cuff Size: Medium Adult)   Pulse 75   Temp 97.9 °F (36.6 °C)   Ht 6' 1\" (1.854 m)   Wt (!) 357 lb (161.9 kg)   SpO2 98% Comment: room air at rest  BMI 47.10 kg/m²    Wt Readings from Last 3 Encounters:   21 (!) 357 lb (161.9 kg)   21 (!) 355 lb 12.8 oz (161.4 kg)   21 (!) 357 lb (161.9 kg)       Physical Exam  Vitals signs and nursing note reviewed. Constitutional:       General: He is not in acute distress. Appearance: He is well-developed. He is obese. HENT:      Head: Normocephalic and atraumatic. Neck:      Trachea: No tracheal deviation. Cardiovascular:      Rate and Rhythm: Normal rate and regular rhythm. Heart sounds: Normal heart sounds. No murmur. Pulmonary:      Effort: Pulmonary effort is normal. No respiratory distress. Breath sounds: No stridor. Examination of the right-lower field reveals decreased breath sounds. Examination of the left-lower field reveals decreased breath sounds. Decreased breath sounds present. No wheezing or rales. Chest:      Chest wall: No tenderness. Abdominal:      General: Bowel sounds are normal. There is distension. Palpations: Abdomen is soft. Musculoskeletal:      Right lower le+ Pitting Edema present. Left lower le+ Pitting Edema present. Skin:     General: Skin is warm and dry.       Capillary Refill: Capillary refill takes less than 2 seconds. Neurological:      Mental Status: He is alert and oriented to person, place, and time. Psychiatric:         Behavior: Behavior normal.         Thought Content: Thought content normal.         Judgment: Judgment normal.          results   Lung Nodule Screening     [] Qualifies    [x] Does not qualify   [] Declined    [] Completed  The USPSTF recommends annual screening for lung cancer with low-dose computed tomography (LDCT) in adults aged 48 to [de-identified] years who have a 30 pack-year smoking history and currently smoke or have quit within the past 20 years. Screening should be discontinued once a person has not smoked for 20 years or develops a health problem that substantially limits life expectancy or the ability or willingness to have curative lung surgery. 5/5/2021     CT CHEST WO CONTRAST   FINDINGS: No mediastinal or hilar adenopathy. Coronary artery calcifications are present. Heart size is normal. There is no axillary lymphadenopathy. There is bilateral gynecomastia. There is some minimal residual haziness in the left base there is dependent atelectasis at the right base. Minimal residual pleural effusions. The groundglass opacities and infiltrates seen in the upper lobes on the prior exam has completely resolved. Upper abdomen There is a very subtle ovoid low-density lesion contiguous with the posterior aspect of the right adrenal gland and right border of the liver. Hounsfield units would suggest a benign adenoma. No suspicious bone lesions. Almost complete resolution of previously seen lung infiltrates with some minimal haziness in the left lower lobe and right basilar subsegmental atelectasis. Assessment      Diagnosis Orders   1. Mild intermittent asthma without complication  POCT Nitric Oxide    Fluticasone furoate-vilanterol (BREO ELLIPTA) 200-25 MCG/INH AEPB inhaler   2.  Chest discomfort  XR CHEST (2 VW)         Plan   -NIOX repeated today more elevated than last vist today 34 (33)  -Stop Flovent  -Start Breo 200/25 mcg 1  Puff daily- rinse mouth after  -Albuterol as needed  -Singulair at night  -CXR 2-view today for LLL discomfort ?  Left pleurisy vs infection  -Start Prednisone burst 40 mg PO daily x 5 days- instructed to watch blood sugars  -Advised to maintain pneumonia vaccine with PCP and to take flu vaccine this coming season.  -Advised patient to call office with any changes, questions, or concerns regarding respiratory status  -RTC 3 months for medication check and repeat NIOX testing   -Continue BiPAP therapy as prescribed.   -Note forwarded to [address] and PCP     Will see Mohsen Villa back in: 3 months    Ailyn Gan, [facility]  5/12/2021

## 2021-05-24 ENCOUNTER — OFFICE VISIT (OUTPATIENT)
Dept: PHYSICAL MEDICINE AND REHAB | Age: 61
End: 2021-05-24
Payer: MEDICARE

## 2021-05-24 ENCOUNTER — APPOINTMENT (OUTPATIENT)
Dept: GENERAL RADIOLOGY | Age: 61
DRG: 291 | End: 2021-05-24
Payer: MEDICARE

## 2021-05-24 ENCOUNTER — OFFICE VISIT (OUTPATIENT)
Dept: CARDIOLOGY CLINIC | Age: 61
End: 2021-05-24
Payer: MEDICARE

## 2021-05-24 ENCOUNTER — APPOINTMENT (OUTPATIENT)
Dept: CT IMAGING | Age: 61
DRG: 291 | End: 2021-05-24
Payer: MEDICARE

## 2021-05-24 ENCOUNTER — HOSPITAL ENCOUNTER (INPATIENT)
Age: 61
LOS: 3 days | Discharge: HOME | DRG: 291 | End: 2021-05-27
Attending: INTERNAL MEDICINE | Admitting: INTERNAL MEDICINE
Payer: MEDICARE

## 2021-05-24 VITALS
WEIGHT: 315 LBS | SYSTOLIC BLOOD PRESSURE: 148 MMHG | HEART RATE: 77 BPM | HEIGHT: 73 IN | BODY MASS INDEX: 41.75 KG/M2 | DIASTOLIC BLOOD PRESSURE: 90 MMHG

## 2021-05-24 VITALS
SYSTOLIC BLOOD PRESSURE: 140 MMHG | DIASTOLIC BLOOD PRESSURE: 80 MMHG | WEIGHT: 315 LBS | BODY MASS INDEX: 41.75 KG/M2 | HEIGHT: 73 IN | HEART RATE: 90 BPM

## 2021-05-24 DIAGNOSIS — F11.90 CHRONIC, CONTINUOUS USE OF OPIOIDS: ICD-10-CM

## 2021-05-24 DIAGNOSIS — M47.816 SPONDYLOSIS OF LUMBAR REGION WITHOUT MYELOPATHY OR RADICULOPATHY: ICD-10-CM

## 2021-05-24 DIAGNOSIS — R06.02 SOB (SHORTNESS OF BREATH): ICD-10-CM

## 2021-05-24 DIAGNOSIS — R06.00 DYSPNEA AND RESPIRATORY ABNORMALITIES: ICD-10-CM

## 2021-05-24 DIAGNOSIS — M25.561 CHRONIC PAIN OF RIGHT KNEE: ICD-10-CM

## 2021-05-24 DIAGNOSIS — M48.062 SPINAL STENOSIS OF LUMBAR REGION WITH NEUROGENIC CLAUDICATION: ICD-10-CM

## 2021-05-24 DIAGNOSIS — M25.511 CHRONIC RIGHT SHOULDER PAIN: ICD-10-CM

## 2021-05-24 DIAGNOSIS — M54.17 LUMBOSACRAL RADICULITIS: Primary | ICD-10-CM

## 2021-05-24 DIAGNOSIS — I50.32 CHF (CONGESTIVE HEART FAILURE), NYHA CLASS II, CHRONIC, DIASTOLIC (HCC): Primary | ICD-10-CM

## 2021-05-24 DIAGNOSIS — G89.29 CHRONIC RIGHT SHOULDER PAIN: ICD-10-CM

## 2021-05-24 DIAGNOSIS — R06.89 DYSPNEA AND RESPIRATORY ABNORMALITIES: ICD-10-CM

## 2021-05-24 DIAGNOSIS — G89.29 CHRONIC PAIN OF RIGHT KNEE: ICD-10-CM

## 2021-05-24 DIAGNOSIS — R07.9 CHEST PAIN IN ADULT: ICD-10-CM

## 2021-05-24 DIAGNOSIS — I50.33 ACUTE ON CHRONIC DIASTOLIC CONGESTIVE HEART FAILURE (HCC): Primary | ICD-10-CM

## 2021-05-24 DIAGNOSIS — G62.9 NEUROPATHY: ICD-10-CM

## 2021-05-24 DIAGNOSIS — M54.2 NECK PAIN: ICD-10-CM

## 2021-05-24 DIAGNOSIS — M47.816 LUMBAR FACET ARTHROPATHY: ICD-10-CM

## 2021-05-24 DIAGNOSIS — M17.11 PRIMARY OSTEOARTHRITIS OF RIGHT KNEE: ICD-10-CM

## 2021-05-24 DIAGNOSIS — G89.4 CHRONIC PAIN SYNDROME: ICD-10-CM

## 2021-05-24 DIAGNOSIS — R07.9 CHEST PAIN, UNSPECIFIED TYPE: ICD-10-CM

## 2021-05-24 PROBLEM — I50.9 CHF WITH UNKNOWN LVEF (HCC): Status: ACTIVE | Noted: 2021-05-24

## 2021-05-24 LAB
ANION GAP SERPL CALCULATED.3IONS-SCNC: 10 MEQ/L (ref 8–16)
AVERAGE GLUCOSE: 219 MG/DL (ref 70–126)
BASOPHILS # BLD: 0.3 %
BASOPHILS ABSOLUTE: 0 THOU/MM3 (ref 0–0.1)
BUN BLDV-MCNC: 12 MG/DL (ref 7–22)
CALCIUM SERPL-MCNC: 9.1 MG/DL (ref 8.5–10.5)
CHLORIDE BLD-SCNC: 102 MEQ/L (ref 98–111)
CO2: 25 MEQ/L (ref 23–33)
CREAT SERPL-MCNC: 1.1 MG/DL (ref 0.4–1.2)
EKG ATRIAL RATE: 75 BPM
EKG P AXIS: 21 DEGREES
EKG P-R INTERVAL: 176 MS
EKG Q-T INTERVAL: 376 MS
EKG QRS DURATION: 100 MS
EKG QTC CALCULATION (BAZETT): 419 MS
EKG R AXIS: 8 DEGREES
EKG T AXIS: 44 DEGREES
EKG VENTRICULAR RATE: 75 BPM
EOSINOPHIL # BLD: 1.5 %
EOSINOPHILS ABSOLUTE: 0.1 THOU/MM3 (ref 0–0.4)
ERYTHROCYTE [DISTWIDTH] IN BLOOD BY AUTOMATED COUNT: 14.6 % (ref 11.5–14.5)
ERYTHROCYTE [DISTWIDTH] IN BLOOD BY AUTOMATED COUNT: 47.7 FL (ref 35–45)
GFR SERPL CREATININE-BSD FRML MDRD: 69 ML/MIN/1.73M2
GLUCOSE BLD-MCNC: 101 MG/DL (ref 70–108)
GLUCOSE BLD-MCNC: 158 MG/DL (ref 70–108)
GLUCOSE BLD-MCNC: 199 MG/DL (ref 70–108)
HBA1C MFR BLD: 9.3 % (ref 4.4–6.4)
HCT VFR BLD CALC: 39.1 % (ref 42–52)
HEMOGLOBIN: 12.4 GM/DL (ref 14–18)
IMMATURE GRANS (ABS): 0.06 THOU/MM3 (ref 0–0.07)
IMMATURE GRANULOCYTES: 0.7 %
LYMPHOCYTES # BLD: 7.4 %
LYMPHOCYTES ABSOLUTE: 0.6 THOU/MM3 (ref 1–4.8)
MCH RBC QN AUTO: 28.6 PG (ref 26–33)
MCHC RBC AUTO-ENTMCNC: 31.7 GM/DL (ref 32.2–35.5)
MCV RBC AUTO: 90.1 FL (ref 80–94)
MONOCYTES # BLD: 11.1 %
MONOCYTES ABSOLUTE: 1 THOU/MM3 (ref 0.4–1.3)
NUCLEATED RED BLOOD CELLS: 0 /100 WBC
OSMOLALITY CALCULATION: 276.9 MOSMOL/KG (ref 275–300)
PLATELET # BLD: 232 THOU/MM3 (ref 130–400)
PMV BLD AUTO: 9.3 FL (ref 9.4–12.4)
POTASSIUM SERPL-SCNC: 4.6 MEQ/L (ref 3.5–5.2)
PRO-BNP: 486.3 PG/ML (ref 0–900)
PROCALCITONIN: 0.13 NG/ML (ref 0.01–0.09)
RBC # BLD: 4.34 MILL/MM3 (ref 4.7–6.1)
SEG NEUTROPHILS: 79 %
SEGMENTED NEUTROPHILS ABSOLUTE COUNT: 6.9 THOU/MM3 (ref 1.8–7.7)
SODIUM BLD-SCNC: 137 MEQ/L (ref 135–145)
T4 FREE: 1.31 NG/DL (ref 0.93–1.76)
TROPONIN T: < 0.01 NG/ML
WBC # BLD: 8.7 THOU/MM3 (ref 4.8–10.8)

## 2021-05-24 PROCEDURE — 84439 ASSAY OF FREE THYROXINE: CPT

## 2021-05-24 PROCEDURE — 1036F TOBACCO NON-USER: CPT | Performed by: NURSE PRACTITIONER

## 2021-05-24 PROCEDURE — 80048 BASIC METABOLIC PNL TOTAL CA: CPT

## 2021-05-24 PROCEDURE — 83036 HEMOGLOBIN GLYCOSYLATED A1C: CPT

## 2021-05-24 PROCEDURE — 99285 EMERGENCY DEPT VISIT HI MDM: CPT

## 2021-05-24 PROCEDURE — 99214 OFFICE O/P EST MOD 30 MIN: CPT | Performed by: NURSE PRACTITIONER

## 2021-05-24 PROCEDURE — G8428 CUR MEDS NOT DOCUMENT: HCPCS | Performed by: INTERNAL MEDICINE

## 2021-05-24 PROCEDURE — 6370000000 HC RX 637 (ALT 250 FOR IP): Performed by: PHYSICIAN ASSISTANT

## 2021-05-24 PROCEDURE — 99223 1ST HOSP IP/OBS HIGH 75: CPT | Performed by: INTERNAL MEDICINE

## 2021-05-24 PROCEDURE — 6370000000 HC RX 637 (ALT 250 FOR IP): Performed by: INTERNAL MEDICINE

## 2021-05-24 PROCEDURE — G8417 CALC BMI ABV UP PARAM F/U: HCPCS | Performed by: INTERNAL MEDICINE

## 2021-05-24 PROCEDURE — 71045 X-RAY EXAM CHEST 1 VIEW: CPT

## 2021-05-24 PROCEDURE — 3017F COLORECTAL CA SCREEN DOC REV: CPT | Performed by: NURSE PRACTITIONER

## 2021-05-24 PROCEDURE — 2580000003 HC RX 258: Performed by: PHYSICIAN ASSISTANT

## 2021-05-24 PROCEDURE — 6360000002 HC RX W HCPCS: Performed by: INTERNAL MEDICINE

## 2021-05-24 PROCEDURE — 36415 COLL VENOUS BLD VENIPUNCTURE: CPT

## 2021-05-24 PROCEDURE — 6360000002 HC RX W HCPCS: Performed by: PHYSICIAN ASSISTANT

## 2021-05-24 PROCEDURE — 84145 PROCALCITONIN (PCT): CPT

## 2021-05-24 PROCEDURE — 71275 CT ANGIOGRAPHY CHEST: CPT

## 2021-05-24 PROCEDURE — 99214 OFFICE O/P EST MOD 30 MIN: CPT | Performed by: INTERNAL MEDICINE

## 2021-05-24 PROCEDURE — 94760 N-INVAS EAR/PLS OXIMETRY 1: CPT

## 2021-05-24 PROCEDURE — 82948 REAGENT STRIP/BLOOD GLUCOSE: CPT

## 2021-05-24 PROCEDURE — 3017F COLORECTAL CA SCREEN DOC REV: CPT | Performed by: INTERNAL MEDICINE

## 2021-05-24 PROCEDURE — 2580000003 HC RX 258: Performed by: INTERNAL MEDICINE

## 2021-05-24 PROCEDURE — 2500000003 HC RX 250 WO HCPCS: Performed by: INTERNAL MEDICINE

## 2021-05-24 PROCEDURE — G8427 DOCREV CUR MEDS BY ELIG CLIN: HCPCS | Performed by: NURSE PRACTITIONER

## 2021-05-24 PROCEDURE — 93005 ELECTROCARDIOGRAM TRACING: CPT | Performed by: INTERNAL MEDICINE

## 2021-05-24 PROCEDURE — 93000 ELECTROCARDIOGRAM COMPLETE: CPT | Performed by: INTERNAL MEDICINE

## 2021-05-24 PROCEDURE — 99222 1ST HOSP IP/OBS MODERATE 55: CPT | Performed by: INTERNAL MEDICINE

## 2021-05-24 PROCEDURE — 83880 ASSAY OF NATRIURETIC PEPTIDE: CPT

## 2021-05-24 PROCEDURE — 1036F TOBACCO NON-USER: CPT | Performed by: INTERNAL MEDICINE

## 2021-05-24 PROCEDURE — G8417 CALC BMI ABV UP PARAM F/U: HCPCS | Performed by: NURSE PRACTITIONER

## 2021-05-24 PROCEDURE — 85025 COMPLETE CBC W/AUTO DIFF WBC: CPT

## 2021-05-24 PROCEDURE — 6360000004 HC RX CONTRAST MEDICATION: Performed by: INTERNAL MEDICINE

## 2021-05-24 PROCEDURE — 94640 AIRWAY INHALATION TREATMENT: CPT

## 2021-05-24 PROCEDURE — 2060000000 HC ICU INTERMEDIATE R&B

## 2021-05-24 PROCEDURE — 84484 ASSAY OF TROPONIN QUANT: CPT

## 2021-05-24 RX ORDER — PROMETHAZINE HYDROCHLORIDE 25 MG/1
12.5 TABLET ORAL EVERY 6 HOURS PRN
Status: DISCONTINUED | OUTPATIENT
Start: 2021-05-24 | End: 2021-05-27 | Stop reason: HOSPADM

## 2021-05-24 RX ORDER — HYDRALAZINE HYDROCHLORIDE 50 MG/1
100 TABLET, FILM COATED ORAL EVERY 8 HOURS SCHEDULED
Status: DISCONTINUED | OUTPATIENT
Start: 2021-05-24 | End: 2021-05-27 | Stop reason: HOSPADM

## 2021-05-24 RX ORDER — DEXTROSE MONOHYDRATE 50 MG/ML
100 INJECTION, SOLUTION INTRAVENOUS PRN
Status: DISCONTINUED | OUTPATIENT
Start: 2021-05-24 | End: 2021-05-27 | Stop reason: HOSPADM

## 2021-05-24 RX ORDER — ROPINIROLE 1 MG/1
1 TABLET, FILM COATED ORAL NIGHTLY
Status: DISCONTINUED | OUTPATIENT
Start: 2021-05-24 | End: 2021-05-27 | Stop reason: HOSPADM

## 2021-05-24 RX ORDER — ONDANSETRON 2 MG/ML
4 INJECTION INTRAMUSCULAR; INTRAVENOUS EVERY 6 HOURS PRN
Status: DISCONTINUED | OUTPATIENT
Start: 2021-05-24 | End: 2021-05-27 | Stop reason: HOSPADM

## 2021-05-24 RX ORDER — TAMSULOSIN HYDROCHLORIDE 0.4 MG/1
0.4 CAPSULE ORAL NIGHTLY
Status: DISCONTINUED | OUTPATIENT
Start: 2021-05-24 | End: 2021-05-27 | Stop reason: HOSPADM

## 2021-05-24 RX ORDER — MAGNESIUM SULFATE IN WATER 40 MG/ML
2000 INJECTION, SOLUTION INTRAVENOUS PRN
Status: DISCONTINUED | OUTPATIENT
Start: 2021-05-24 | End: 2021-05-27 | Stop reason: HOSPADM

## 2021-05-24 RX ORDER — POTASSIUM CHLORIDE 7.45 MG/ML
10 INJECTION INTRAVENOUS PRN
Status: DISCONTINUED | OUTPATIENT
Start: 2021-05-24 | End: 2021-05-27 | Stop reason: HOSPADM

## 2021-05-24 RX ORDER — METHYLPREDNISOLONE SODIUM SUCCINATE 125 MG/2ML
125 INJECTION, POWDER, LYOPHILIZED, FOR SOLUTION INTRAMUSCULAR; INTRAVENOUS ONCE
Status: COMPLETED | OUTPATIENT
Start: 2021-05-24 | End: 2021-05-24

## 2021-05-24 RX ORDER — ROPINIROLE 0.5 MG/1
0.5 TABLET, FILM COATED ORAL 3 TIMES DAILY
Status: DISCONTINUED | OUTPATIENT
Start: 2021-05-24 | End: 2021-05-24

## 2021-05-24 RX ORDER — ACETAMINOPHEN 325 MG/1
650 TABLET ORAL EVERY 6 HOURS PRN
Status: DISCONTINUED | OUTPATIENT
Start: 2021-05-24 | End: 2021-05-27 | Stop reason: HOSPADM

## 2021-05-24 RX ORDER — LEVOTHYROXINE SODIUM 0.1 MG/1
200 TABLET ORAL DAILY
Status: DISCONTINUED | OUTPATIENT
Start: 2021-05-24 | End: 2021-05-27 | Stop reason: HOSPADM

## 2021-05-24 RX ORDER — OXYCODONE AND ACETAMINOPHEN 10; 325 MG/1; MG/1
1 TABLET ORAL EVERY 8 HOURS PRN
Status: DISCONTINUED | OUTPATIENT
Start: 2021-05-24 | End: 2021-05-27 | Stop reason: HOSPADM

## 2021-05-24 RX ORDER — GABAPENTIN 300 MG/1
300 CAPSULE ORAL NIGHTLY
Status: DISCONTINUED | OUTPATIENT
Start: 2021-05-24 | End: 2021-05-27 | Stop reason: HOSPADM

## 2021-05-24 RX ORDER — DEXTROSE MONOHYDRATE 25 G/50ML
12.5 INJECTION, SOLUTION INTRAVENOUS PRN
Status: DISCONTINUED | OUTPATIENT
Start: 2021-05-24 | End: 2021-05-27 | Stop reason: HOSPADM

## 2021-05-24 RX ORDER — ASPIRIN 81 MG/1
81 TABLET ORAL DAILY
Status: DISCONTINUED | OUTPATIENT
Start: 2021-05-24 | End: 2021-05-27 | Stop reason: HOSPADM

## 2021-05-24 RX ORDER — FAMOTIDINE 20 MG/1
20 TABLET, FILM COATED ORAL 2 TIMES DAILY
Status: DISCONTINUED | OUTPATIENT
Start: 2021-05-24 | End: 2021-05-27 | Stop reason: HOSPADM

## 2021-05-24 RX ORDER — OXYBUTYNIN CHLORIDE 10 MG/1
10 TABLET, EXTENDED RELEASE ORAL DAILY
Status: DISCONTINUED | OUTPATIENT
Start: 2021-05-24 | End: 2021-05-27 | Stop reason: HOSPADM

## 2021-05-24 RX ORDER — SODIUM CHLORIDE 0.9 % (FLUSH) 0.9 %
5-40 SYRINGE (ML) INJECTION PRN
Status: DISCONTINUED | OUTPATIENT
Start: 2021-05-24 | End: 2021-05-27 | Stop reason: HOSPADM

## 2021-05-24 RX ORDER — POTASSIUM CHLORIDE 20 MEQ/1
40 TABLET, EXTENDED RELEASE ORAL PRN
Status: DISCONTINUED | OUTPATIENT
Start: 2021-05-24 | End: 2021-05-27 | Stop reason: HOSPADM

## 2021-05-24 RX ORDER — SPIRONOLACTONE 25 MG/1
50 TABLET ORAL 2 TIMES DAILY
Status: DISCONTINUED | OUTPATIENT
Start: 2021-05-24 | End: 2021-05-27 | Stop reason: HOSPADM

## 2021-05-24 RX ORDER — CARVEDILOL 25 MG/1
25 TABLET ORAL 2 TIMES DAILY
Status: DISCONTINUED | OUTPATIENT
Start: 2021-05-24 | End: 2021-05-27 | Stop reason: HOSPADM

## 2021-05-24 RX ORDER — MONTELUKAST SODIUM 10 MG/1
10 TABLET ORAL DAILY
Status: DISCONTINUED | OUTPATIENT
Start: 2021-05-24 | End: 2021-05-27 | Stop reason: HOSPADM

## 2021-05-24 RX ORDER — IPRATROPIUM BROMIDE AND ALBUTEROL SULFATE 2.5; .5 MG/3ML; MG/3ML
1 SOLUTION RESPIRATORY (INHALATION)
Status: DISCONTINUED | OUTPATIENT
Start: 2021-05-24 | End: 2021-05-27 | Stop reason: HOSPADM

## 2021-05-24 RX ORDER — NITROGLYCERIN 0.4 MG/1
0.4 TABLET SUBLINGUAL EVERY 5 MIN PRN
Status: COMPLETED | OUTPATIENT
Start: 2021-05-24 | End: 2021-05-24

## 2021-05-24 RX ORDER — BUMETANIDE 0.25 MG/ML
1 INJECTION, SOLUTION INTRAMUSCULAR; INTRAVENOUS EVERY 12 HOURS
Status: DISCONTINUED | OUTPATIENT
Start: 2021-05-24 | End: 2021-05-27 | Stop reason: HOSPADM

## 2021-05-24 RX ORDER — ASPIRIN 81 MG/1
324 TABLET, CHEWABLE ORAL ONCE
Status: COMPLETED | OUTPATIENT
Start: 2021-05-24 | End: 2021-05-24

## 2021-05-24 RX ORDER — NICOTINE POLACRILEX 4 MG
15 LOZENGE BUCCAL PRN
Status: DISCONTINUED | OUTPATIENT
Start: 2021-05-24 | End: 2021-05-27 | Stop reason: HOSPADM

## 2021-05-24 RX ORDER — AMITRIPTYLINE HYDROCHLORIDE 10 MG/1
20 TABLET, FILM COATED ORAL NIGHTLY
Status: DISCONTINUED | OUTPATIENT
Start: 2021-05-24 | End: 2021-05-27 | Stop reason: HOSPADM

## 2021-05-24 RX ORDER — ROPINIROLE 0.5 MG/1
0.5 TABLET, FILM COATED ORAL
Status: DISCONTINUED | OUTPATIENT
Start: 2021-05-25 | End: 2021-05-27 | Stop reason: HOSPADM

## 2021-05-24 RX ORDER — DIPHENHYDRAMINE HYDROCHLORIDE 50 MG/ML
25 INJECTION INTRAMUSCULAR; INTRAVENOUS ONCE
Status: COMPLETED | OUTPATIENT
Start: 2021-05-24 | End: 2021-05-24

## 2021-05-24 RX ORDER — 0.9 % SODIUM CHLORIDE 0.9 %
500 INTRAVENOUS SOLUTION INTRAVENOUS ONCE
Status: COMPLETED | OUTPATIENT
Start: 2021-05-24 | End: 2021-05-24

## 2021-05-24 RX ORDER — FUROSEMIDE 10 MG/ML
20 INJECTION INTRAMUSCULAR; INTRAVENOUS ONCE
Status: COMPLETED | OUTPATIENT
Start: 2021-05-24 | End: 2021-05-24

## 2021-05-24 RX ORDER — SODIUM CHLORIDE 0.9 % (FLUSH) 0.9 %
5-40 SYRINGE (ML) INJECTION EVERY 12 HOURS SCHEDULED
Status: DISCONTINUED | OUTPATIENT
Start: 2021-05-24 | End: 2021-05-27 | Stop reason: HOSPADM

## 2021-05-24 RX ORDER — SODIUM CHLORIDE 9 MG/ML
25 INJECTION, SOLUTION INTRAVENOUS PRN
Status: DISCONTINUED | OUTPATIENT
Start: 2021-05-24 | End: 2021-05-27 | Stop reason: HOSPADM

## 2021-05-24 RX ORDER — ACETAMINOPHEN 650 MG/1
650 SUPPOSITORY RECTAL EVERY 6 HOURS PRN
Status: DISCONTINUED | OUTPATIENT
Start: 2021-05-24 | End: 2021-05-27 | Stop reason: HOSPADM

## 2021-05-24 RX ORDER — POLYETHYLENE GLYCOL 3350 17 G/17G
17 POWDER, FOR SOLUTION ORAL DAILY PRN
Status: DISCONTINUED | OUTPATIENT
Start: 2021-05-24 | End: 2021-05-27 | Stop reason: HOSPADM

## 2021-05-24 RX ADMIN — DIPHENHYDRAMINE HYDROCHLORIDE 25 MG: 50 INJECTION INTRAMUSCULAR; INTRAVENOUS at 18:35

## 2021-05-24 RX ADMIN — NITROGLYCERIN 0.4 MG: 0.4 TABLET, ORALLY DISINTEGRATING SUBLINGUAL at 12:23

## 2021-05-24 RX ADMIN — HYDRALAZINE HYDROCHLORIDE 100 MG: 50 TABLET, FILM COATED ORAL at 18:36

## 2021-05-24 RX ADMIN — IPRATROPIUM BROMIDE AND ALBUTEROL SULFATE 1 AMPULE: .5; 3 SOLUTION RESPIRATORY (INHALATION) at 18:30

## 2021-05-24 RX ADMIN — SPIRONOLACTONE 50 MG: 25 TABLET ORAL at 21:00

## 2021-05-24 RX ADMIN — MONTELUKAST SODIUM 10 MG: 10 TABLET ORAL at 18:37

## 2021-05-24 RX ADMIN — NITROGLYCERIN 1 INCH: 20 OINTMENT TOPICAL at 23:07

## 2021-05-24 RX ADMIN — OXYBUTYNIN CHLORIDE 10 MG: 10 TABLET, EXTENDED RELEASE ORAL at 21:00

## 2021-05-24 RX ADMIN — FAMOTIDINE 20 MG: 20 TABLET, FILM COATED ORAL at 21:00

## 2021-05-24 RX ADMIN — BUMETANIDE 1 MG: 0.25 INJECTION INTRAMUSCULAR; INTRAVENOUS at 18:35

## 2021-05-24 RX ADMIN — SODIUM CHLORIDE, PRESERVATIVE FREE 10 ML: 5 INJECTION INTRAVENOUS at 21:00

## 2021-05-24 RX ADMIN — METHYLPREDNISOLONE SODIUM SUCCINATE 125 MG: 125 INJECTION, POWDER, FOR SOLUTION INTRAMUSCULAR; INTRAVENOUS at 18:35

## 2021-05-24 RX ADMIN — NITROGLYCERIN 0.4 MG: 0.4 TABLET, ORALLY DISINTEGRATING SUBLINGUAL at 12:41

## 2021-05-24 RX ADMIN — FUROSEMIDE 20 MG: 10 INJECTION, SOLUTION INTRAMUSCULAR; INTRAVENOUS at 14:05

## 2021-05-24 RX ADMIN — OXYCODONE HYDROCHLORIDE AND ACETAMINOPHEN 1 TABLET: 10; 325 TABLET ORAL at 18:35

## 2021-05-24 RX ADMIN — NITROGLYCERIN 0.4 MG: 0.4 TABLET, ORALLY DISINTEGRATING SUBLINGUAL at 12:33

## 2021-05-24 RX ADMIN — SACUBITRIL AND VALSARTAN 1 TABLET: 24; 26 TABLET, FILM COATED ORAL at 21:00

## 2021-05-24 RX ADMIN — NITROGLYCERIN 1 INCH: 20 OINTMENT TOPICAL at 18:35

## 2021-05-24 RX ADMIN — TAMSULOSIN HYDROCHLORIDE 0.4 MG: 0.4 CAPSULE ORAL at 21:00

## 2021-05-24 RX ADMIN — IPRATROPIUM BROMIDE AND ALBUTEROL SULFATE 1 AMPULE: .5; 3 SOLUTION RESPIRATORY (INHALATION) at 22:18

## 2021-05-24 RX ADMIN — GABAPENTIN 300 MG: 300 CAPSULE ORAL at 21:00

## 2021-05-24 RX ADMIN — ASPIRIN 324 MG: 81 TABLET, CHEWABLE ORAL at 12:22

## 2021-05-24 RX ADMIN — AMITRIPTYLINE HYDROCHLORIDE 20 MG: 10 TABLET, FILM COATED ORAL at 21:00

## 2021-05-24 RX ADMIN — IOPAMIDOL 80 ML: 755 INJECTION, SOLUTION INTRAVENOUS at 14:24

## 2021-05-24 RX ADMIN — LEVOTHYROXINE SODIUM 200 MCG: 0.1 TABLET ORAL at 23:08

## 2021-05-24 RX ADMIN — CARVEDILOL 25 MG: 25 TABLET, FILM COATED ORAL at 23:08

## 2021-05-24 RX ADMIN — ENOXAPARIN SODIUM 60 MG: 60 INJECTION SUBCUTANEOUS at 21:00

## 2021-05-24 RX ADMIN — ASPIRIN 81 MG: 81 TABLET, COATED ORAL at 18:35

## 2021-05-24 RX ADMIN — SODIUM CHLORIDE 500 ML: 9 INJECTION, SOLUTION INTRAVENOUS at 14:11

## 2021-05-24 RX ADMIN — ROPINIROLE HYDROCHLORIDE 1 MG: 1 TABLET, FILM COATED ORAL at 23:07

## 2021-05-24 ASSESSMENT — PAIN DESCRIPTION - ONSET: ONSET: ON-GOING

## 2021-05-24 ASSESSMENT — ENCOUNTER SYMPTOMS
SORE THROAT: 0
VOMITING: 0
NAUSEA: 0
BACK PAIN: 1
CHEST TIGHTNESS: 1
DIARRHEA: 0
COUGH: 1
COUGH: 0
ABDOMINAL PAIN: 0
CONSTIPATION: 1
PHOTOPHOBIA: 0
SHORTNESS OF BREATH: 1
RHINORRHEA: 0
SHORTNESS OF BREATH: 1

## 2021-05-24 ASSESSMENT — PAIN DESCRIPTION - LOCATION
LOCATION: BACK;CHEST
LOCATION: BACK

## 2021-05-24 ASSESSMENT — PAIN SCALES - GENERAL
PAINLEVEL_OUTOF10: 7
PAINLEVEL_OUTOF10: 10
PAINLEVEL_OUTOF10: 5
PAINLEVEL_OUTOF10: 6

## 2021-05-24 ASSESSMENT — PAIN DESCRIPTION - DESCRIPTORS
DESCRIPTORS: STABBING
DESCRIPTORS: CONSTANT;DULL

## 2021-05-24 ASSESSMENT — PAIN DESCRIPTION - PAIN TYPE
TYPE: ACUTE PAIN
TYPE: CHRONIC PAIN

## 2021-05-24 ASSESSMENT — PAIN DESCRIPTION - FREQUENCY: FREQUENCY: CONTINUOUS

## 2021-05-24 ASSESSMENT — PAIN DESCRIPTION - PROGRESSION: CLINICAL_PROGRESSION: GRADUALLY WORSENING

## 2021-05-24 ASSESSMENT — PAIN - FUNCTIONAL ASSESSMENT: PAIN_FUNCTIONAL_ASSESSMENT: PREVENTS OR INTERFERES SOME ACTIVE ACTIVITIES AND ADLS

## 2021-05-24 ASSESSMENT — PAIN DESCRIPTION - ORIENTATION: ORIENTATION: LEFT

## 2021-05-24 NOTE — FLOWSHEET NOTE
05/24/21 1713   Provider Notification   Reason for Communication New orders   Provider Name  [facility] - [address] [place] [address] - [facility] MAMADOU JORDAN   Provider Notification Physician   Method of Communication Secure Message   Response Waiting for response   Notification Time [id number]   sent from Dr casarez office for exacerbation of CHF

## 2021-05-24 NOTE — FLOWSHEET NOTE
..Pt admitted to  [address] [place] [address] from ED from ED. Complaints: Shortness of breath. IV none infusing into the forearm left, condition patent and no redness at a rate of 0 mls/ hour. IV site free of s/s of infection or infiltration. Vital signs obtained. Assessment and data collection initiated. Two nurse skin assessment performed by Patience Merino RN and Tierra Montez RN. Oriented to room. Policies and procedures for  explained. All questions answered with no further questions at this time. Fall prevention and safety brochure discussed with patient. Bed alarm on. Call light in reach. Oriented to room. Edson Avila, RN, RN 5/24/2021 5:58 PM     Explained patients right to have family, representative or physician notified of their admission. Patient has Requested for physician to be notified. Patient has Requested for family/representative to be notified.   Hippa code shared with spouse per patient

## 2021-05-24 NOTE — ED PROVIDER NOTES
gram was not performed due to renal dysfunction. REVIEW OF SYSTEMS     Review of Systems   Constitutional: Positive for chills and diaphoresis. Negative for fever. HENT: Negative for congestion, rhinorrhea and sore throat. Eyes: Negative for photophobia. Respiratory: Positive for shortness of breath. Negative for cough. Cardiovascular: Positive for chest pain. Gastrointestinal: Positive for constipation. Negative for abdominal pain, diarrhea, nausea and vomiting. Genitourinary: Negative for difficulty urinating. Musculoskeletal: Negative for gait problem. Skin: Negative for rash. Neurological: Negative for dizziness, weakness, light-headedness and numbness. Psychiatric/Behavioral: Negative for confusion. The patient is not nervous/anxious. PAST MEDICAL HISTORY    has a past medical history of Arthritis, Back problem, CHF with unknown LVEF ([address] Constipation, Diabetes mellitus ([address] Hypertension, Sleep apnea, and Thyroid disease. SURGICAL HISTORY      has a past surgical history that includes Appendectomy; knee surgery (Right, 1980's); Carpal tunnel release (Bilateral); Colonoscopy (2017); hernia repair; pr exploratory of abdomen (N/A, 2/5/2018); Dilatation, esophagus; and Abdomen surgery. CURRENT MEDICATIONS       Previous Medications    ACETAMINOPHEN (TYLENOL) 500 MG TABLET    Take 1 tablet by mouth every 6 hours as needed for Pain    ALBUTEROL (PROVENTIL HFA;VENTOLIN HFA) 108 (90 BASE) MCG/ACT INHALER    Inhale 1 puff into the lungs every 6 hours as needed for Wheezing. AMITRIPTYLINE (ELAVIL) 10 MG TABLET    Take 2 tablets by mouth nightly    AMLODIPINE (NORVASC) 10 MG TABLET    Take 10 mg by mouth daily    AZELASTINE (ASTELIN) 137 MCG/SPRAY NASAL SPRAY    1 spray by Nasal route as needed.  Use in each nostril as directed    BISMUTH SUBSALICYLATE (PEPTO BISMOL) 262 MG/15ML SUSPENSION    Take by mouth daily    BUMETANIDE (BUMEX) 2 MG TABLET    Take 1 tablet by mouth daily AND as directed by CHF clinic    CARVEDILOL (COREG) 25 MG TABLET    Take 1 tablet by mouth 2 times daily    CYCLOBENZAPRINE (FLEXERIL) 10 MG TABLET    Take 10 mg by mouth 3 times daily as needed for Muscle spasms    FLUTICASONE FUROATE-VILANTEROL (BREO ELLIPTA) 200-25 MCG/INH AEPB INHALER    Inhale 1 puff into the lungs daily    GABAPENTIN (NEURONTIN) 300 MG CAPSULE    Take 1 capsule by mouth nightly for 90 days. GLIMEPIRIDE (AMARYL) 4 MG TABLET    Take 4 mg by mouth daily    HYDRALAZINE (APRESOLINE) 100 MG TABLET    Take 1 tablet by mouth every 8 hours    LEVOTHYROXINE (SYNTHROID) 200 MCG TABLET    Take 1 tablet by mouth Daily    LOSARTAN (COZAAR) 25 MG TABLET    Take 1 tablet by mouth daily    MONTELUKAST (SINGULAIR) 10 MG TABLET    Take 1 tablet by mouth daily    OXYBUTYNIN (DITROPAN XL) 10 MG EXTENDED RELEASE TABLET    Take 1 tablet by mouth daily    OXYCODONE-ACETAMINOPHEN (PERCOCET)  MG PER TABLET    Take 1 tablet by mouth every 8 hours as needed for Pain for up to 30 days. POTASSIUM CHLORIDE (MICRO-K) 10 MEQ EXTENDED RELEASE CAPSULE    Take 2 capsules by mouth daily    RA ASPIRIN EC 81 MG EC TABLET    take 1 tablet by mouth once daily    ROPINIROLE (REQUIP) 0.5 MG TABLET    Take 1 pill in afternoon and 2 pills at night. SACUBITRIL-VALSARTAN (ENTRESTO) 24-26 MG PER TABLET    Take 1 tablet by mouth 2 times daily    SPIRONOLACTONE (ALDACTONE) 50 MG TABLET    Take 1 tablet by mouth 2 times daily    TAMSULOSIN (FLOMAX) 0.4 MG CAPSULE    Take 1 capsule by mouth nightly       ALLERGIES     is allergic to shellfish-derived products, pcn [penicillins], succinylcholine, morphine, and tape [adhesive tape]. FAMILY HISTORY     He indicated that his mother is . He indicated that his father is . He indicated that all of his four brothers are alive. He indicated that the status of his paternal grandmother is unknown.  He indicated that the status of his neg hx is unknown.   family history includes Arthritis in his brother; Cancer in his father and mother; Diabetes in his paternal grandmother; Heart Attack in his brother; Heart Disease in his father; Prostate Cancer in his brother; Stroke in his brother. SOCIAL HISTORY    reports that he has never smoked. He has quit using smokeless tobacco.  His smokeless tobacco use included chew. He reports that he does not drink alcohol and does not use drugs. PHYSICAL EXAM     INITIAL VITALS:  height is 6' 1\" (1.854 m) and weight is 361 lb (163.7 kg) (abnormal). His oral temperature is 98.4 °F (36.9 °C). His blood pressure is 144/74 (abnormal) and his pulse is 69. His respiration is 20 and oxygen saturation is 94%. Physical Exam  Constitutional:       Appearance: Normal appearance. He is well-developed. He is not ill-appearing or diaphoretic. HENT:      Head: Normocephalic and atraumatic. Right Ear: Hearing normal.      Left Ear: Hearing normal.      Nose: Nose normal. No rhinorrhea. Mouth/Throat:      Lips: Pink. Mouth: Mucous membranes are moist.      Pharynx: Oropharynx is clear. Eyes:      General: Lids are normal. No scleral icterus. Extraocular Movements: Extraocular movements intact. Conjunctiva/sclera: Conjunctivae normal.      Pupils: Pupils are equal, round, and reactive to light. Neck:      Trachea: Trachea normal.   Cardiovascular:      Rate and Rhythm: Normal rate and regular rhythm. Heart sounds: Normal heart sounds. No murmur heard. Pulmonary:      Effort: Pulmonary effort is normal.      Breath sounds: Decreased breath sounds present. No wheezing or rhonchi. Abdominal:      General: There is no distension. Palpations: Abdomen is soft. Tenderness: There is no abdominal tenderness. Musculoskeletal:      Cervical back: Normal range of motion and neck supple. No rigidity. Comments:  Well perfused; movement normal as observed; no signs of DVT   Lymphadenopathy:      Cervical: No cervical adenopathy. Skin:     General: Skin is warm and dry. Findings: No rash. Neurological:      General: No focal deficit present. Mental Status: He is alert. Sensory: Sensation is intact. Motor: Motor function is intact. Gait: Gait is intact. Psychiatric:         Mood and Affect: Mood normal.         Speech: Speech normal.         Behavior: Behavior is cooperative. DIFFERENTIAL DIAGNOSIS:   Including but not limited to: ACS, MI, musculoskeletal, COPD, CHF    DIAGNOSTIC RESULTS     EKG: All EKG's are interpreted by [facility] who either signs or Co-signs this chart in the absence of a cardiologist.  Yudikikitasha Vasquez. Rate: 75 bpm  VA interval: 175 ms  QRS duration: 100 ms  QTc: 376/419 ms  P-R-T axes: 21, 8, 44  Normal sinus rhythm. RADIOLOGY: non-plain film images(s) such as CT,Ultrasound and MRI are read by the radiologist.  Plain radiographic images are visualized and preliminarily interpreted by the emergency physician unless otherwise stated below. CTA CHEST W WO CONTRAST   Final Result   No PE. Mild cardiomegaly. **This report has been created using voice recognition software. It may contain minor errors which are inherent in voice recognition technology. **      Final report electronically signed by Dr. Eufemia Bueno on 5/24/2021 3:03 PM      XR CHEST PORTABLE   Final Result   1. Interval deterioration since previous study dated 12 May 2021 with increasing cardiomegaly and pulmonary vascular redistribution. These findings may represent changes of congestive heart failure versus fluid overload. .               **This report has been created using voice recognition software. It may contain minor errors which are inherent in voice recognition technology. **      Final report electronically signed by DR Lien Hurt on 5/24/2021 11:42 AM          LABS:   Labs Reviewed   CBC WITH AUTO DIFFERENTIAL - Abnormal; Notable for the following components: Result Value    RBC 4.34 (*)     Hemoglobin 12.4 (*)     Hematocrit 39.1 (*)     MCHC 31.7 (*)     RDW-CV 14.6 (*)     RDW-SD 47.7 (*)     MPV 9.3 (*)     Lymphocytes Absolute 0.6 (*)     All other components within normal limits   BASIC METABOLIC PANEL - Abnormal; Notable for the following components:    Glucose 158 (*)     All other components within normal limits   GLOMERULAR FILTRATION RATE, ESTIMATED - Abnormal; Notable for the following components:    Est, Glom Filt Rate 69 (*)     All other components within normal limits   PROCALCITONIN - Abnormal; Notable for the following components:    Procalcitonin 0.13 (*)     All other components within normal limits   TROPONIN   ANION GAP   OSMOLALITY   BRAIN NATRIURETIC PEPTIDE   T4, FREE   TROPONIN   HEMOGLOBIN A1C   TROPONIN   TROPONIN   POCT GLUCOSE   POCT GLUCOSE       EMERGENCY DEPARTMENT COURSE:   Vitals:    Vitals:    05/24/21 1246 05/24/21 1404 05/24/21 1444 05/24/21 1529   BP: 135/74 (!) 147/79 (!) 155/82 (!) 144/74   Pulse: 73 73 70 69   Resp: 19 19 20 20   Temp:       TempSrc:       SpO2: 94% 95% 94% 94%   Weight:       Height:             MDM:  The patient was seen by me in the emergency room for worsening chest pain and shortness of breath onset 4 days. Patient is also experiencing chills and diaphoresis. Physical exam revealed diminished breath sounds. Vital signs reviewed and noted stable. Old records were reviewed. Appropriate laboratory and imaging studies were ordered and reviewed upon completion. Pertinent findings: CXR revealed interval deterioration since previous on 5/12/2021with increasing cardiomegaly and pulmonary vascular redistriubtion. BNP was 486.3. Troponin was normal.    Interventions: Patient was given  Lasix, Nitroglycerin, and ASA with improvement of chest pain. Results were discussed with the patient as well as desire for admission and they were amenable.  Dr. Alva Montoya of our hospitalists' service was consulted and

## 2021-05-24 NOTE — ED NOTES
ED to inpatient nurses report    Chief Complaint   Patient presents with    Chest Pain    Shortness of Breath      Present to ED from home  LOC: alert and orientated to name, place, date  Vital signs   Vitals:    05/24/21 1246 05/24/21 1404 05/24/21 1444 05/24/21 1529   BP: 135/74 (!) 147/79 (!) 155/82 (!) 144/74   Pulse: 73 73 70 69   Resp: 19 19 20 20   Temp:       TempSrc:       SpO2: 94% 95% 94% 94%   Weight:       Height:          Oxygen Baseline room air    Current needs required room air Bipap/Cpap No  LDAs:   Peripheral IV 05/24/21 Left Antecubital (Active)   Site Assessment Clean;Dry; Intact 05/24/21 1117   Dressing Status Clean;Dry; Intact 05/24/21 1117     Mobility: Requires assistance * 1  Present condition: stable      Electronically signed by Robe Samayoa RN on 5/24/2021 at 4:20 PM       Robe Samayoa RN  05/24/21 6997

## 2021-05-24 NOTE — PROGRESS NOTES
[facility] [place] [address] [address] [place] [address]  Dept: [phone]  Dept Fax: [phone]  Loc: [phone]    Visit Date: 5/24/2021    Mr. Radha Avila is a 64 y.o. male  who presented for:  Chief Complaint   Patient presents with    Check-Up    Cardiomyopathy    Shortness of Breath    Chest Pain       HPI:   65 yo M  c hx of HTN, DM, Obesity, LAURIE on BIPAP, Cardiomyopathy, colon resection 2/2017 is here for a follow up. He underwent RHC+LHC in 12/2020. LHC showed patent coronaries. RHC showed: Elevated PA pressures and LVEDP. He reports his breathing has been worsening since last Thursday and it has been progressively worsening. He follows CHF clinic and as pulmonary clinic. He is compliant with BIPAP. He is morbidly obese  He also states his belly is distended and not had a bowel movement for 3-4 days. He is able to pass gas. He also reports some left back pain which worsens on deep inspiration. Had some subjective chills. Left heart cath:  Clean coronaries    RIGHT HEART CATHETERIZATION:  1.  RA is 25.  2.  RV is 80/23, 27.  3.  PA is 82/44, 59.  4.  Wedge pressure is 35.  5.  LV is 188/28, 36.  6.  AO was 205/126, 160.  7.  AO saturation is 91%. 8.  PA saturation is 63%. 9.  Cardiac output by Cherelle method is 5.9. 10.  Cardiac index is 2.1. Current Outpatient Medications:     Fluticasone furoate-vilanterol (BREO ELLIPTA) 200-25 MCG/INH AEPB inhaler, Inhale 1 puff into the lungs daily, Disp: 3 each, Rfl: 3    oxyCODONE-acetaminophen (PERCOCET)  MG per tablet, Take 1 tablet by mouth every 8 hours as needed for Pain for up to 30 days. , Disp: 90 tablet, Rfl: 0    rOPINIRole (REQUIP) 0.5 MG tablet, Take 1 pill in afternoon and 2 pills at night., Disp: 90 tablet, Rfl: 2    amitriptyline (ELAVIL) 10 MG tablet, Take 2 tablets by mouth nightly, Disp: 60 tablet, Rfl: 5    RA ASPIRIN EC 81 MG EC tablet, take 1 decreased concentration and dysphoric mood    All others reviewed and are negative. Objective:     BP (!) 148/90   Pulse 77   Ht 6' 1\" (1.854 m)   Wt (!) 361 lb (163.7 kg)   BMI 47.63 kg/m²     Wt Readings from Last 3 Encounters:   05/24/21 (!) 361 lb (163.7 kg)   05/24/21 (!) 357 lb (161.9 kg)   05/12/21 (!) 357 lb (161.9 kg)     BP Readings from Last 3 Encounters:   05/24/21 (!) 148/90   05/24/21 (!) 140/80   05/12/21 118/62       PHYSICAL EXAM  Constitutional: noticeable short of breath, and fatigue  HENT:   Head: Normocephalic and atraumatic. Eyes: EOM are normal. Pupils are equal, round, and reactive to light. Neck: Normal range of motion. Neck supple. No JVD present. Cardiovascular: Normal rate , normal heart sounds and intact distal pulses. Pulmonary/Chest: Pleuritic pain on deep inspiration  Abdominal: Soft. Bowel sounds are normal. No distension. There is no tenderness. Musculoskeletal: Normal range of motion. No edema. Neurological: Alert and oriented to person, place, and time. No cranial nerve deficit. Coordination normal.   Skin: Skin is warm and dry. Psychiatric: Normal mood and affect.        No results found for: CKTOTAL, CKMB, CKMBINDEX    Lab Results   Component Value Date    WBC 8.1 04/15/2021    RBC 5.16 04/15/2021    RBC 5.15 08/12/2011    HGB 14.9 04/15/2021    HCT 47.0 04/15/2021    MCV 91.1 04/15/2021    MCH 28.9 04/15/2021    MCHC 31.7 04/15/2021    RDW 14.7 02/09/2018     04/15/2021    MPV 10.0 04/15/2021       Lab Results   Component Value Date     04/15/2021    K 4.7 04/15/2021    K 2.9 12/01/2020    CL 99 04/15/2021    CO2 26 04/15/2021    BUN 18 04/15/2021    LABALBU 4.4 04/15/2021    CREATININE 1.1 04/15/2021    CALCIUM 9.9 04/15/2021    LABGLOM 69 04/15/2021    GLUCOSE 212 04/15/2021       Lab Results   Component Value Date    ALKPHOS 76 04/15/2021    ALT 47 04/15/2021    AST 41 04/15/2021    PROT 8.3 04/15/2021    BILITOT 0.5 04/15/2021    BILIDIR <0.2 03/02/2021    LABALBU 4.4 04/15/2021       Lab Results   Component Value Date    MG 2.1 03/03/2021       Lab Results   Component Value Date    INR 1.06 12/01/2020    INR 1.03 07/02/2019    INR 1.15 (H) 09/21/2017         Lab Results   Component Value Date    LABA1C 8.6 04/15/2021       Lab Results   Component Value Date    TRIG 207 04/15/2021    HDL 30 04/15/2021    LDLCALC 76 04/15/2021       Lab Results   Component Value Date    TSH 3.090 04/15/2021         Testing Reviewed:      I haveindividually reviewed the below cardiac tests    EKG:    ECHO:   Results for orders placed during the hospital encounter of 04/26/19   ECHO Complete 2D W Doppler W Color    Narrative Transthoracic Echocardiography Report (TTE)     Demographics      Patient Name   Enolia Cheadle  Gender              Male                  A      MR #           [id number]       Race                                                     Ethnicity      Account #      [de-identified]       Room Number      Accession      [id number]       Date of Study       04/26/2019   Number      Date of Birth  1960      Referring Physician Du Reyes DO      Age            66 year(s)      [address],                                                      [facility]                                     Interpreting        Rubi Barahona MD                                  Physician     Procedure    Type of Study      TTE procedure:ECHOCARDIOGRAM COMPLETE 2D W DOPPLER W COLOR. Procedure Date  Date: 04/26/2019 Start: 07:22 AM    Study Location: Echo Lab  Technical Quality: Poor visualization due to body habitus. Indications:Dyspnea on exertion.     Additional Medical History:Sleep apnea, hypertension, hypothyroidism    Patient Status: Routine    Height: 73 inches Weight: 352.01 pounds BSA: 2.74 m^2 BMI: 46.44 kg/m^2    BP: 182/92 mmHg     Conclusions Summary   Technically difficult examination. Left ventricular size is normal and systolic function is mildly reduced. Ejection fraction was estimated at 40-45%. LV wall thickness is within   normal limits. The right ventricular size appears normal with normal systolic function   and wall thickness. Signature      ----------------------------------------------------------------   Electronically signed by Carlyon Severin MD (Interpreting   physician) on 04/26/2019 at 05:05 PM   ----------------------------------------------------------------      Findings      Mitral Valve   The mitral valve was not well visualized . DOPPLER: The transmitral velocity was within the normal range with no   evidence for mitral stenosis. There was no evidence of mitral   regurgitation. Aortic Valve   The aortic valve leaflets were not well visualized. DOPPLER: Transaortic velocity was within the normal range with no evidence   of aortic stenosis. There was no evidence of aortic regurgitation. Tricuspid Valve   Tricuspid valve was not well visualized. DOPPLER: There is no evidence of tricuspid stenosis. There was no evidence   of tricuspid regurgitation. Pulmonic Valve   The pulmonic valve was not well visualized . Dione Sida DOPPLER: The transpulmonic velocity was within the normal range. No   evidence for regurgitation. Left Atrium   Left atrial size is normal.      Left Ventricle   Left ventricular size is normal and systolic function is mildly reduced. Ejection fraction was estimated at 40-45%. LV wall thickness is within   normal limits. Right Atrium   Right atrial size was normal.      Right Ventricle   The right ventricular size appears normal with normal systolic function   and wall thickness. Pericardial Effusion   The pericardium appears normal with no evidence of a pericardial effusion. Pleural Effusion   No evidence of pleural effusion.       Aorta / Great Vessels   -Aortic root dimension within normal limits. -IVC size is within normal limits with normal respiratory phasic changes.      M-Mode/2D Measurements & Calculations      LV Diastolic   LV Systolic Dimension:    AV Cusp Separation: 2.3 cmLA   Dimension: 5.1 3.9 cm                    Dimension: 4.8 cmAO Root   cm             LV Volume Diastolic: 662  Dimension: 3.8 cmLA Area: 20.8   LV FS:23.5 %   ml                        cm^2   LV PW          LV Volume Systolic: 45.8   Diastolic: 1.4 ml   cm             LV EDV/LV EDV Index: 124   Septum         ml/45 m^2LV ESV/LV ESV    RV Diastolic Dimension: 3.4 cm   Diastolic: 1.4 Index: 20.5 ml/24 m^2   cm             EF Calculated: 46.9 %     LA/Aorta: 1.26                                            Ascending Aorta: 3.6 cm                                            LA volume/Index: 51.9 ml /19m^2     Doppler Measurements & Calculations      MV Peak E-Wave: 89.7  AV Peak Velocity: 155 LVOT Peak Velocity: 109 cm/s   cm/s                  cm/s                  LVOT Mean Velocity: 71.6 cm/s   MV Peak A-Wave: 81.9  AV Peak Gradient:     LVOT Peak Gradient: 5 mmHgLVOT   cm/s                  9.61 mmHg             Mean Gradient: 2 mmHg   MV E/A Ratio: 1.1     AV Mean Velocity:   MV Peak Gradient:     91.2 cm/s             TV Peak E-Wave: 29.8 cm/s   3.22 mmHg             AV Mean Gradient: 4   TV Peak A-Wave: 42.7 cm/s                         mmHg   MV Deceleration Time: AV VTI: 28.5 cm       TV Peak Gradient: 0.36 mmHg   268 msec   MV P1/2t: 78 msec                           PV Peak Velocity: 69.7 cm/s   MVA by PHT:2.82 cm^2  LVOT VTI: 24.8 cm     PV Peak Gradient: 1.94 mmHg                         IVRT: 95 msec   MV E' Septal   Velocity: 5.5 cm/s   MV A' Septal          AV DVI (VTI): 0.87AV   Velocity: 7.9 cm/s    DVI (Vmax):0.7   MV E' Lateral   Velocity: 9.4 cm/s   MV A' Lateral   Velocity: 11.3 cm/s   E/E' septal: 16.31   E/E' lateral: 9.54   MR Velocity: 373 cm/s [email or web address] [id number]%[id number]%3d       STRESS:    CATH:    Assessment/Plan       Diagnosis Orders   1. CHF (congestive heart failure), NYHA class II, chronic, diastolic (HCC)  EKG 12 lead   2. Chest pain in adult  EKG 12 lead   3. SOB (shortness of breath)  EKG 12 lead     Worsening shortness of breath/Pleuritic chest/back pain:  Acute on chronic CHF vs pleural effusion vs Pneumonia  Cardiomyopathy, EF improved to 55-60%  Morbid Obesity  LAURIE on BiPAP  DM  HTN-mildly uncontrolled  Palpitations     Will refer to ED for CT chest  Needs admission and treamtment for Acute Diastolic CHF  Might need BIPAP therapy  Continue Bumex for now  Will need IV diuresis  Needs labs to further clarify his symptoms  Has subjective chills, will need infectious workup     Return in about 3 months (around 8/24/2021), or if symptoms worsen or fail to improve, for Regular follow up, Review testing.        Electronically signed by William Bryson MD [facility]  5/24/2021 at 1:12 PM

## 2021-05-24 NOTE — PROGRESS NOTES
[address] [facility] [place]  Dept: [phone]  Dept Fax: [phone]: [phone]    Visit Date: 5/24/2021    Functionality Assessment/Goals Worksheet     On a scale of 0 (Does not Interfere) to 10 (Completely Interferes)     1. Which number describes how during the past week pain has interfered with       the following:  A. General Activity:  9  B. Mood: 8  C. Walking Ability:  9  D. Normal Work (Includes both work outside the home and housework):  9  E. Relations with Other People:   8  F. Sleep:   9  G. Enjoyment of Life:   10    2. Patient Prefers to Take their Pain Medications:     [x]  On a regular basis   []  Only when necessary    []  Does not take pain medications    3. What are the Patient's Goals/Expectations for Visiting Pain Management? [x]  Learn about my pain    []  Receive Medication   []  Physical Therapy     []  Treat Depression   []  Receive Injections    []  Treat Sleep   [x]  Deal with Anxiety and Stress   []  Treat Opoid Dependence/Addiction   [x]  Other:States having issues with left lung      HPI:   Pietro Seaman is a 64 y.o. male is here today for    Chief Complaint: Low back pain, leg pain, right knee pain     HPI   2 month FU and to review xrays. Continues to have pain in lower back radiating across lower back and down into buttocks/ SI area and down right lower extremity posteriorly to feet. Pain is constant and is described as a \"tooth ache pain in back and buttocks\" in right leg is burning stabbing and numbness. States that this pain is not as bad as it has been. More active. Continues to have pain in right knee- aching, stabbing   Current pain medications remain effective. Continues to follow with pulmonology- has increased shortness of breath. Continues to follow up in CHF clinic   Medications reviewed.  Patient denies side effects with medications. Patient states he is taking medications as prescribed. Hedenies receiving pain medications from other sources. He denies any ER visits since last visit. Pain scale with out pain medications or at its worst is 8-10/10. Pain scale with pain medications or at its best is 5/10. Last dose of Percocet and neurontin was yesterday  Drug screen reviewed from 3/22/2021 and was appropriate  Pill count was completed today and was appropriate  Patient does not have naloxone available at home. Patient has not required use of naloxone at home since last office visit. Right shoulder xray:   1. Mild degenerative changes right shoulder. 2. Slight irregular/slightly hazy appearance along the superior cortical margin of the right clavicle distally, of uncertain significance. MRI of the right shoulder recommended for further       Right knee xray:      FINDINGS: No fracture or dislocation is seen. There is no foreign body. There is moderately severe narrowing of the medial tibiofemoral joint compartment. There is moderate spurring of the knee medially and laterally and mild lateral subluxation of the    proximal tibia relative to distal femur. Marked degenerative spurring involves the patellofemoral joint and is moderate spurring along the distal femur and proximal tibia posteriorly. A 1 cm ossicle seen posterior to the knee. Cannot exclude a loose    articular body. There is a questionable small suprapatellar joint effusion.           Impression   Relatively severe degenerative changes of the knee. Questionable loose articular body. No acute findings. Overall appearance of knee has worsened since prior study.               The patientis allergic to shellfish-derived products, pcn [penicillins], succinylcholine, morphine, and tape [adhesive tape]. Subjective:      Review of Systems   Constitutional: Negative. Negative for fever.    Respiratory: Positive for cough, chest tightness and shortness of breath. Cardiovascular: Positive for leg swelling. Musculoskeletal: Positive for arthralgias, back pain, gait problem, joint swelling, myalgias and neck stiffness. Negative for neck pain. Skin: Negative. Neurological: Positive for weakness and numbness. Psychiatric/Behavioral: Negative. Objective:     Vitals:    05/24/21 0859 05/24/21 0904   BP: (!) 140/80 (!) 140/80   Site: Left Upper Arm Right Upper Arm   Position: Sitting    Cuff Size: Large Adult    Pulse: 90    Weight: (!) 357 lb (161.9 kg)    Height: 6' 1\" (1.854 m)        Physical Exam  Constitutional:       General: He is not in acute distress. Appearance: Normal appearance. He is obese. He is not ill-appearing, toxic-appearing or diaphoretic. HENT:      Head: Normocephalic and atraumatic. Right Ear: External ear normal. There is no impacted cerumen. Left Ear: External ear normal. There is no impacted cerumen. Nose: Nose normal. No congestion or rhinorrhea. Mouth/Throat:      Mouth: Mucous membranes are moist.      Pharynx: Oropharynx is clear. Eyes:      Extraocular Movements: Extraocular movements intact. Conjunctiva/sclera: Conjunctivae normal.      Pupils: Pupils are equal, round, and reactive to light. Cardiovascular:      Rate and Rhythm: Normal rate and regular rhythm. Pulses: Normal pulses. Heart sounds: Murmur heard. Pulmonary:      Effort: Pulmonary effort is normal. No respiratory distress. Breath sounds: Normal breath sounds. Abdominal:      General: Abdomen is flat. Bowel sounds are normal. There is no distension. Palpations: Abdomen is soft. There is no mass. Tenderness: There is no abdominal tenderness. Hernia: No hernia is present. Musculoskeletal:         General: Tenderness present. Right shoulder: Tenderness present. Decreased range of motion. Right wrist: Tenderness present. Decreased range of motion.       Left wrist: Tenderness present. Decreased range of motion. Cervical back: Neck supple. Tenderness and bony tenderness present. Muscular tenderness present. Thoracic back: Bony tenderness present. Decreased range of motion. Lumbar back: Tenderness and bony tenderness present. Decreased range of motion. Back:       Right hip: Decreased range of motion. Decreased strength. Left hip: Decreased range of motion. Decreased strength. Right knee: Swelling present. Decreased range of motion. Tenderness present. Right lower leg: Edema present. Left lower leg: Edema present. Right ankle: Swelling present. Tenderness present. Decreased range of motion. Left ankle: Tenderness present. Skin:     General: Skin is warm and dry. Capillary Refill: Capillary refill takes less than 2 seconds. Neurological:      General: No focal deficit present. Mental Status: He is alert and oriented to person, place, and time. Sensory: Sensory deficit present. Motor: Weakness present. Coordination: Romberg sign positive. Coordination abnormal.      Gait: Gait abnormal.      Deep Tendon Reflexes:      Reflex Scores:       Tricep reflexes are 2+ on the right side and 2+ on the left side. Bicep reflexes are 2+ on the right side and 2+ on the left side. Brachioradialis reflexes are 2+ on the right side and 2+ on the left side. Patellar reflexes are 1+ on the right side and 1+ on the left side. Achilles reflexes are 1+ on the right side and 1+ on the left side. Comments: 4/5/strength bilateral lower extremities    + bilateral SLR at 40 degrees    Decraesed sensation bilateral feet    Psychiatric:         Mood and Affect: Mood normal.         Behavior: Behavior normal.       SAUNDRA test: + bilaterally   Yeomans test: + bilaterally   Gaenslen test: + bilaterally      Assessment:     1. Lumbosacral radiculitis    2.  Spinal stenosis of lumbar region with neurogenic claudication    3. Spondylosis of lumbar region without myelopathy or radiculopathy    4. Lumbar facet arthropathy    5. Neuropathy    6. Neck pain    7. Chronic right shoulder pain    8. Primary osteoarthritis of right knee    9. Chronic pain syndrome    10. Chronic pain of right knee    11. Chronic, continuous use of opioids            Plan:      · OARRS reviewed. Current MED: 45.00  · Patient was offered naloxone for home. · Discussed long term side effects of medications, tolerance, dependency and addiction. · Previous UDS reviewed  · UDS preformed today for compliance. · Patient told can not receive any pain medications from any other source. · No evidence of abuse, diversion or aberrant behavior.  Medications and/or procedures to improve function and quality of life- patient understanding with this and that may not be pain free   Discussed with patient about safe storage of medications at home   Discussed possible weaning of medication dosing dependent on treatment/procedure results.  Discussed with patient about risks with procedure including infection, reaction to medication, increased pain, or bleeding. · Reviewed L-Xray and L-MRI in detail, reviewed right knee and shoulder xray  ·  Discussed L-facet MBB, LESI possibly in future along with right knee injection. · Patient wants to hold off at this time d/t heart issues following in CHF clinic and breathing issues. Instructed to follow up with pulmonology- reveiwed chest CT and xray   · Continue home exercises   · Continue Percocet 10/325 TID prn filled 5/10/2021, Continue Neurontin 300 mg at bedtime- has plenty   · Updated UDS ordered today       Meds. Prescribed:   No orders of the defined types were placed in this encounter. Return in about 10 weeks (around 8/2/2021), or if symptoms worsen or fail to improve, for follow up  for medications.                Electronically signed by SAHIL Mathew CNP on5/24/2021 at 9:48 AM

## 2021-05-24 NOTE — ACP (ADVANCE CARE PLANNING)
Advance Care Planning     Advance Care Planning Activator (Inpatient)  Conversation Note      Date of ACP Conversation: 5/24/2021     Conversation Conducted with: Patient with Decision Making Capacity    ACP Activator: Charley Berry RN, BSN, [address]:     Current Designated Health Care Decision Maker:  Wife Concepcion Jesus, [phone]    Today we documented Decision Maker(s) consistent with Legal Next of Kin hierarchy. Care Preferences    Ventilation: \"If you were in your present state of health and suddenly became very ill and were unable to breathe on your own, what would your preference be about the use of a ventilator (breathing machine) if it were available to you? \"      Would the patient desire the use of ventilator (breathing machine)?: Yes, not long term but does wish to be an organ donor    \"If your health worsens and it becomes clear that your chance of recovery is unlikely, what would your preference be about the use of a ventilator (breathing machine) if it were available to you? \"     Would the patient desire the use of ventilator (breathing machine)?: No      Resuscitation  \"CPR works best to restart the heart when there is a sudden event, like a heart attack, in someone who is otherwise healthy. Unfortunately, CPR does not typically restart the heart for people who have serious health conditions or who are very sick. \"    \"In the event your heart stopped as a result of an underlying serious health condition, would you want attempts to be made to restart your heart (answer \"yes\" for attempt to resuscitate) or would you prefer a natural death (answer \"no\" for do not attempt to resuscitate)? \" yes       [x] Yes   [] No   Educated Patient / Morris Gil regarding differences between Advance Directives and portable DNR orders.     Length of ACP Conversation in minutes:  10  Conversation Outcomes:  [x] ACP discussion completed  [x] Existing advance directive reviewed with patient; no changes to patient's previously recorded wishes  [] New Advance Directive completed  [] Portable Do Not Rescitate prepared for Provider review and signature  [] POLST/POST/MOLST/MOST prepared for Provider review and signature      Follow-up plan:    [] Schedule follow-up conversation to continue planning  [] Referred individual to Provider for additional questions/concerns   [] Advised patient/agent/surrogate to review completed ACP document and update if needed with changes in condition, patient preferences or care setting    [] This note routed to one or more involved healthcare providers       Patient will think about completing HCPOA. He will let nursing know if he decides he wants to. He has family in [place], no one else in [place] and would need to think about it. I explained next of kin law. I indicated that him telling his people his wishes is most important. No consults at this time.

## 2021-05-24 NOTE — ED NOTES
Patient resting in bed. Respirations easy and unlabored. No distress noted. Call light within reach. Updated on [address]. Will monitor.       Jeremi Anderson RN  05/24/21 [id number]

## 2021-05-24 NOTE — ED NOTES
Pt presents to the ER for chest pain and SOB that began 5 days ago and is getting worse.      Nida Milligan  05/24/21 [id number]

## 2021-05-24 NOTE — ED NOTES
Rates pain 3/10 at this time. Alert and oriented. Respirations easy and unlabored. Will monitor.       Carl Silva RN  05/24/21 [id number]

## 2021-05-24 NOTE — ED NOTES
Patient states he has had IV contrast in the past with no reaction. Saurav SINGH notified and will hold benadryl and solu medrol at this time.       Rafi Larkin RN  05/24/21 [id number]

## 2021-05-24 NOTE — CONSULTS
[facility]'s  Cardiology Consult      Patient:  Everton Albright  YOB: 1960    MRN: [id number]   Acct: [de-identified]     Primary Care Physician: Soila Sanders DO    REASON FOR CONSULT:    chf     CHIEF COMPLAINT:    SOB    HISTORY OF PRESENT ILLNESS:    Everton Albright is a pleasant 64year old male patient with past medical history that includes:   Past Medical History:   Diagnosis Date    Arthritis     Back problem     CHF with unknown LVEF ([facility] [address] 5/24/2021    Constipation     Diabetes mellitus ([facility] [address]     Hypertension     Sleep apnea     has CPAP    Thyroid disease    The patient was admitted to the hospital after he was sent to ER from Dr Sherry Palafox office. He has been experiencing worsening SOB, AKINS, abdominal distention and nonproductive cough. He also reports having intermittent chest pains, retrosternal, atypical, sharp, worsen with deep breathing, mild, non-radiating, occurs mostly at rest, improved today with NTG. Patient denies paroxysmal nocturnal dyspnea, palpitations, dizziness, syncope. Labs revealed Cr 1.1, Troponin <0.01. ProBNP 486. EKG revealed NSR. Chest CTA revealed no PE, cardiomegaly was noticed. CXR was consistent with pulmonary edema, cardiomegaly. Echo 12/2020 revealed preserved EF. On 12/2020, LHC showed patent coronaries.     All labs, EKG's, diagnostic testing and images as well as cardiac cath, stress testing   were reviewed during this encounter    CARDIAC TESTING  Echo:   ECHO: Results for orders placed during the hospital encounter of 12/15/20    Echo 2D w doppler w color complete    Narrative  Transthoracic Echocardiography Report (TTE)    Demographics    Patient Name    Mireya Hernandez Gender                Male    MR #            [id number]        Race                      Ethnicity    Account #       [de-identified]        Room Number    Accession       [id number]       Date of Study         12/15/2020  Number    Date of Birth 1960       Referring Physician   [address] [facility], [facility]    Age             64 year(s)       Wava Najjar, [facility]    Interpreting          Sravani Stapleton MD  Physician    Procedure    Type of Study    TTE procedure:ECHOCARDIOGRAM COMPLETE 2D W DOPPLER W COLOR. Procedure Date  Date: 12/15/2020 Start: 02:04 PM    Study Location: Echo Lab  Technical Quality: Limited visualization due to body habitus. Indications:Congestive heart failure. Additional Medical History:Arthritis, Thyroid disease, Hypertension,  Diabetes, Sleep apnea, Tobacco use    Patient Status: Routine    Height: 72.83 inches Weight: 368.01 pounds BSA: 2.78 m^2 BMI: 48.77 kg/m^2    BP: 138/68 mmHg    Allergies  - Penicillins.    - Morphine.    - Other allergy:(Succinylcholine). - Shellfish. Conclusions    Summary  Technically difficult examination. Left ventricular size and systolic function is normal. Ejection fraction  was estimated at 55-60%. LV wall thickness is within normal limits and  there are no obvious wall motion abnormalities. Signature    ----------------------------------------------------------------  Electronically signed by Sravani Stapleton MD (Interpreting  physician) on 12/15/2020 at 03:22 PM  ----------------------------------------------------------------    Findings    Mitral Valve  The mitral valve was not well visualized . DOPPLER: The transmitral velocity was within the normal range with no  evidence for mitral stenosis. There was no evidence of mitral  regurgitation. Aortic Valve  The aortic valve leaflets were not well visualized. DOPPLER: Transaortic velocity was within the normal range with no evidence  of aortic stenosis. There was no evidence of aortic regurgitation. Tricuspid Valve  Tricuspid valve was not well visualized. DOPPLER: There is no evidence of tricuspid stenosis. There was no evidence  of tricuspid regurgitation.     Pulmonic Valve  The pulmonic valve was not well visualized . [facility] DOPPLER: The transpulmonic velocity was within the normal range. No  evidence for regurgitation. Left Atrium  Left atrial size is normal.    Left Ventricle  Left ventricular size and systolic function is normal. Ejection fraction  was estimated at 55-60%. LV wall thickness is within normal limits and  there are no obvious wall motion abnormalities. Right Atrium  Right atrial size was normal.    Right Ventricle  The right ventricular size appears normal with normal systolic function  and wall thickness. Pericardial Effusion  The pericardium appears normal with no evidence of a pericardial effusion. Pleural Effusion  No evidence of pleural effusion. Aorta / Great Vessels  -Aortic root dimension within normal limits. -IVC size is within normal limits with normal respiratory phasic changes.     M-Mode/2D Measurements & Calculations    LV Diastolic   LV Systolic Dimension:    AV Cusp Separation: 1.8 cmLA  Dimension: 6   4.1 cm                    Dimension: 3.8 cmAO Root  cm             LV Volume Diastolic: 420  Dimension: 3.3 cmLA Area: 22.1  LV FS:31.7 %   ml                        cm^2  LV PW          LV Volume Systolic: 36.5  Diastolic: 1.1 ml  cm             LV EDV/LV EDV Index: 180  Septum         ml/65 m^2LV ESV/LV ESV    RV Diastolic Dimension: 2.8 cm  Diastolic: 1.1 Index: 05.4 ml/27 m^2  cm             EF Calculated: 58.8 %     LA/Aorta: 1.15    LA volume/Index: 65.2 ml /23m^2    Doppler Measurements & Calculations    MV Peak E-Wave: 90.8 cm/s  AV Peak Velocity: 163  LVOT Peak Velocity: 95.1  MV Peak A-Wave: 90.8 cm/s  cm/s                   cm/s  MV E/A Ratio: 1            AV Peak Gradient:      LVOT Peak Gradient: 4  MV Peak Gradient: 3.3 mmHg 10.63 mmHg             mmHg    MV Deceleration Time: 236                         TV Peak E-Wave: 45.8  msec                                              cm/s  MV P1/2t: 69 msec                                 TV Peak A-Wave: 46. 3  MVA by PHT:3.19 cm^2       IVRT: 88 msec          cm/s    MV E' Septal Velocity: 7.4                        TV Peak Gradient: 0.84  cm/s                       AV DVI (Vmax):0.58     mmHg  MV A' Septal Velocity: 8.9  cm/s                                              PV Peak Velocity: 79.7  MV E' Lateral Velocity:                           cm/s  9.4 cm/s                                          PV Peak Gradient: 2.54  MV A' Lateral Velocity:                           mmHg  7.9 cm/s  E/E' septal: 12.27  E/E' lateral: 9.66  MR Velocity: 315 cm/s    [email or web address] [id number]      Past Medical History:    Past Medical History:   Diagnosis Date    Arthritis     Back problem     CHF with unknown LVEF ([facility] [address] 5/24/2021    Constipation     Diabetes mellitus ([facility] [address]     Hypertension     Sleep apnea     has CPAP    Thyroid disease        Past Surgical History:    Past Surgical History:   Procedure Laterality Date    ABDOMEN SURGERY      APPENDECTOMY      CARPAL TUNNEL RELEASE Bilateral     COLONOSCOPY  2017    Dr. Maria De Jesus Womack, ESOPHAGUS     [place]      x3 surgeries with 14 repairs    KNEE SURGERY Right 1980's    cartilage    [place] EXPLORATORY OF ABDOMEN N/A 2/5/2018    ABDOMINAL EXPLORATION WITH LYSIS OF COMPLICATED ADHESIONS WITH AN EXTENDED RIGHT COLON RESECTION performed by Libia Cabral MD at [facility]       Medications Prior to Admission:    Medications Prior to Admission: Fluticasone furoate-vilanterol (BREO ELLIPTA) 200-25 MCG/INH AEPB inhaler, Inhale 1 puff into the lungs daily  oxyCODONE-acetaminophen (PERCOCET)  MG per tablet, Take 1 tablet by mouth every 8 hours as needed for Pain for up to 30 days. rOPINIRole (REQUIP) 0.5 MG tablet, Take 1 pill in afternoon and 2 pills at night.   amitriptyline (ELAVIL) 10 MG tablet, Take 2 tablets by mouth nightly  RA drink alcohol and does not use drugs. Family History:   family history includes Arthritis in his brother; Cancer in his father and mother; Diabetes in his paternal grandmother; Heart Attack in his brother; Heart Disease in his father; Prostate Cancer in his brother; Stroke in his brother. REVIEW OF SYSTEMS:  Constitutional: negative for anorexia, chills and fevers,weight change  Skin: negative for new skin rash per patient  HEENT: negative for head trauma or new visual changes  Respiratory: negative for cough, hemoptysis, wheezing  Cardiovascular: negative for  orthopnea, palpitations and syncope. Gastrointestinal: negative for abdominal pain,nausea , vomiting, constipation, diarrhea. Hematologic/lymphatic: negative for bruising,prolonged bleeding,blood clots  Musculoskeletal:negative for muscle weakness, myalgias,wasting  Neurological: negative for coordination problems, dizziness, gait problems and vertigo  Behavioral/Psych:negative for mood/sleep disturbance      PHYSICAL EXAM:   Vitals:  Patient Vitals for the past 24 hrs:   BP Temp Temp src Pulse Resp SpO2 Height Weight   05/24/21 1845 133/76 98.4 °F (36.9 °C) Oral 69 18 96 % -- --   05/24/21 1833 -- -- -- -- 18 99 % -- --   05/24/21 1640 (!) 143/88 97.4 °F (36.3 °C) Oral 54 18 98 % -- (!) 358 lb 6.4 oz (162.6 kg)   05/24/21 1529 (!) 144/74 -- -- 69 20 94 % -- --   05/24/21 1444 (!) 155/82 -- -- 70 20 94 % -- --   05/24/21 1404 (!) 147/79 -- -- 73 19 95 % -- --   05/24/21 1246 135/74 -- -- 73 19 94 % -- --   05/24/21 1240 (!) 145/68 -- -- 75 23 94 % -- --   05/24/21 1229 (!) 148/76 -- -- 74 (!) 34 -- -- --   05/24/21 1224 (!) 164/89 -- -- 72 24 95 % -- --   05/24/21 1118 (!) 167/81 -- -- 72 20 96 % -- --   05/24/21 1100 (!) 151/84 98.4 °F (36.9 °C) Oral 74 18 96 % 6' 1\" (1.854 m) (!) 361 lb (163.7 kg)       Last 3 weights:    Wt Readings from Last 3 Encounters:   05/24/21 (!) 358 lb 6.4 oz (162.6 kg)   05/24/21 (!) 361 lb (163.7 kg)   05/24/21 (!) 357 lb (161.9 kg)     24 hour intake/output:    Intake/Output Summary (Last 24 hours) at 5/24/2021 1857  Last data filed at 5/24/2021 1740  Gross per 24 hour   Intake --   Output 750 ml   Net -750 ml     BMI:Body mass index is 47.29 kg/m². General Appearance: alert and oriented to person, place and time, well developed and well- nourished, in no acute distress  Skin: warm and dry, no rash or erythema  Eyes: pupils equal, round, and reactive to light, extraocular eye movements intact, conjunctivae normal  Neck: supple and non-tender without mass, no thyromegaly or thyroid nodules, no cervical lymphadenopathy  Pulmonary/Chest: clear to auscultation bilaterally- no wheezes, rales or rhonchi, normal air movement, no respiratory distress  Cardiovascular: normal rate, regular rhythm, normal S1 and S2, no murmur. No rubs, clicks, or gallops, distal pulses intact, no carotid bruits, Negative JVD  Radial Pulses: intact 2+  Abdomen: soft, non-tender, non-distended, normal bowel sounds, no masses or organomegaly  Extremities: no cyanosis, clubbing . +1 pitting leg Edema  Musculoskeletal: normal range of motion, no joint swelling, deformity or tenderness      RADIOLOGY   CTA CHEST W WO CONTRAST    Result Date: 5/24/2021  PROCEDURE: CTA CHEST W WO CONTRAST CLINICAL INFORMATION: sob; cp . COMPARISON: 5/5/2021 TECHNIQUE: 2-D multiplanar postcontrast images of chest with 3-D MIPS. Isovue-370 contrast. All CT scans at this facility use dose modulation, iterative reconstruction, and/or weight-based dosing when appropriate to reduce radiation dose to as low as reasonably achievable. FINDINGS: There is no pulmonary embolism. There is no aneurysm or dissection. There is no mediastinal adenopathy. There is no hilar adenopathy by size criteria. Nonenlarged right hilar nodes are present. There is no axillary lymphadenopathy. Trachea and bronchi are patent. Heart is mildly enlarged in size. Coronary artery calcifications are present.  There is no pericardial effusion. No infiltrates or pleural effusions are seen there is mild pleural thickening at the lung bases. Upper abdomen No acute abnormalities. Bones Degenerative changes throughout thoracic spine. No suspicious bone lesions. Bilateral gynecomastia. No PE. Mild cardiomegaly. **This report has been created using voice recognition software. It may contain minor errors which are inherent in voice recognition technology. ** Final report electronically signed by Dr. Hang Rowland on 5/24/2021 3:03 PM    XR CHEST PORTABLE    Result Date: 5/24/2021  PROCEDURE: XR CHEST PORTABLE CLINICAL INFORMATION: sob. COMPARISON: Chest x-ray dated: May 2021. . TECHNIQUE: AP upright view of the chest. FINDINGS: There is mild cardiomegaly. There is increased pulmonary vascularity. .The mediastinum is not widened. There is slight increased density throughout both lung fields. . No suspicious osseous lesions are present. 1. Interval deterioration since previous study dated 12 May 2021 with increasing cardiomegaly and pulmonary vascular redistribution. These findings may represent changes of congestive heart failure versus fluid overload. . **This report has been created using voice recognition software. It may contain minor errors which are inherent in voice recognition technology. ** Final report electronically signed by  [facility] on 5/24/2021 11:42 AM      LABS:  Recent Labs     05/24/21  1114 05/24/21  1609   TROPONINT < 0.010  < 0.010 < 0.010     CBC:   Lab Results   Component Value Date    WBC 8.7 05/24/2021    RBC 4.34 05/24/2021    RBC 5.15 08/12/2011    HGB 12.4 05/24/2021    HCT 39.1 05/24/2021    MCV 90.1 05/24/2021    MCH 28.6 05/24/2021    MCHC 31.7 05/24/2021    RDW 14.7 02/09/2018     05/24/2021    MPV 9.3 05/24/2021     BMP:    Lab Results   Component Value Date     05/24/2021    K 4.6 05/24/2021    K 2.9 12/01/2020     05/24/2021    CO2 25 05/24/2021    BUN 12 05/24/2021 LABALBU 4.4 04/15/2021    CREATININE 1.1 05/24/2021    CALCIUM 9.1 05/24/2021    LABGLOM 69 05/24/2021    GLUCOSE 158 05/24/2021     Hepatic Function Panel:    Lab Results   Component Value Date    ALKPHOS 76 04/15/2021    ALT 47 04/15/2021    AST 41 04/15/2021    PROT 8.3 04/15/2021    BILITOT 0.5 04/15/2021    BILIDIR <0.2 03/02/2021    LABALBU 4.4 04/15/2021     Magnesium:    Lab Results   Component Value Date    MG 2.1 03/03/2021     Warfarin PT/INR:  No components found for: PTPATWAR, PTINRWAR  HgBA1c:    Lab Results   Component Value Date    LABA1C 9.3 05/24/2021     FLP:    Lab Results   Component Value Date    TRIG 207 04/15/2021    HDL 30 04/15/2021    LDLCALC 76 04/15/2021     TSH:    Lab Results   Component Value Date    TSH 3.090 04/15/2021     BNP: No results found for: BNP      ASSESSMENT:  Melisa Murray is a pleasant 64year old male patient with past medical history that includes:   Past Medical History:   Diagnosis Date    Arthritis     Back problem     CHF with unknown LVEF ([facility] [address] 5/24/2021    Constipation     Diabetes mellitus ([facility] [address]     Hypertension     Sleep apnea     has CPAP    Thyroid disease    The patient was admitted to the hospital after he was sent to ER from Dr Sampson Goodwin office. He has been experiencing worsening SOB, AKINS, abdominal distention and nonproductive cough. He also reports having intermittent chest pains, retrosternal, atypical, sharp, worsen with deep breathing, mild, non-radiating, occurs mostly at rest, improved today with NTG. Patient denies paroxysmal nocturnal dyspnea, palpitations, dizziness, syncope. Labs revealed Cr 1.1, Troponin <0.01. ProBNP 486. EKG revealed NSR. Chest CTA revealed no PE, cardiomegaly was noticed. CXR was consistent with pulmonary edema, cardiomegaly. Echo 12/2020 revealed preserved EF. On 12/2020, LHC showed patent coronaries. 1. Acute on chronic HFpEF  2. Chest pain  3. Nonobstructive CAD   4. LAURIE  5. Obesity   6. DM  7.  Hypertension 8. SOB     RECOMMENDATIONS:  Monitor fluid intake and output, maintain a negative fluid balance  Fluid restriction to less than 2 liters per day  Na restriction to less than 2,000 mg per day  Daily weight  Monitor on Telemetry   Monitor electrolytes, keep K>4 and Mg>2  Daily BMP  Chest pain has resolved   Cath in 12/2020 revealed no significant CAD  EKG reviewed  Troponin <0.01  Echo in AM  Serial cardiac enzymes   Has known h/o pulmonary HTN  Has LAURIE  Cont CPAP at time of sleep     Above findings and plan of care were discussed with patient, questions were answered, agreeable to plan. Thank you for allowing me to participate in the care of this patient. Please let me know if I can be of any further assistance.       Ashley Santiago MD, Audra Dancer   6:57 PM  5/24/2021

## 2021-05-24 NOTE — H&P
Hospitalist H+P      Patient:  Everton Albright    Unit/Bed:07/007A  YOB: 1960  MRN: [id number]   Acct: [de-identified]     PCP: Soila Sanders DO  Date of Admission: 5/24/2021        Assessment and Plan:        1. Chest pain: We will trend troponins, obtain serial EKGs, echocardiogram, resume home meds, cardiology consulted, Nitropaste added  2. Shortness of breath: We will obtain CT with and without contrast of chest, rule out pleural effusion versus pneumonia versus volume overload versus pulmonary embolism, will also obtain procalcitonin  3. Acute congestive heart failure diastolic: We will diurese, obtain echo, will trend BNP, Nitropaste  4. Obstructive sleep apnea on BiPAP we will continue home settings  5. Diabetes mellitus we will place on carb controlled diet along with a 2 g sodium diet, insulin sliding scale, check a hemoglobin A1c and urine for microalbumin  6. Chronic kidney disease stage II: will monitor with serial BMPs and creatinine  7. Cardiomyopathy: EF 55 to 60%  8. Morbidly obese    CC: Chest pain shortness of breath    HPI: 72-year-old white male obese who presents for chest pain and shortness of breath. Patient was sent to the ED from Dr. Sherry Palafox office this morning patient reports shortness of breath and left-sided chest pain that radiates to his back onset 4 days ago while at rest.  Patient describes the pain as stabbing and rates the pain as 10 out of 10. He denies taking anything for the pain. He reports shortness of breath is worse with with exertion describes as a truck sitting on his chest and states he was unable to perform yard work as he usually does. He denies having chest pain or shortness of breath similar to this before but that this is worse than previous. He reports chills and diaphoresis this morning and constipation for 3 days. He reports she is not taken any of his medications today. He denies smoking alcohol or illicit drug use.   He denies headache lightheadedness dizziness nausea vomiting diarrhea leg swelling leg pain fever abdominal pain. Patient has a past medical history of arthritis back problems constipation diabetes mellitus hypertension sleep apnea and thyroid disease    ROS (14 point review of systems completed. Pertinent positives noted.  Otherwise ROS is negative) : Chills, diaphoresis, shortness of breath, chest pain, constipation    PMH:  Per HPI and       Diagnosis Date    Arthritis     Back problem     CHF with unknown LVEF ([address] 5/24/2021    Constipation     Diabetes mellitus ([address]     Hypertension     Sleep apnea     has CPAP    Thyroid disease      SHX:        Procedure Laterality Date    ABDOMEN SURGERY      APPENDECTOMY      CARPAL TUNNEL RELEASE Bilateral     COLONOSCOPY  2017    Dr. Keny Shields, ESOPHAGUS     [address] [facility]      x3 surgeries with 14 repairs    KNEE SURGERY Right 1980's    cartilage    MA EXPLORATORY OF ABDOMEN N/A 2/5/2018    ABDOMINAL EXPLORATION WITH LYSIS OF COMPLICATED ADHESIONS WITH AN EXTENDED RIGHT COLON RESECTION performed by Mora Gottron, MD at [address] [place] [address]:       Problem Relation Age of Onset    Cancer Mother         breast    Heart Disease Father         bladder, lung    Cancer Father     Stroke Brother     Heart Attack Brother     Prostate Cancer Brother     Diabetes Paternal Grandmother     Arthritis Brother     High Blood Pressure Neg Hx      SOCHX:   Social History     Socioeconomic History    Marital status:      Spouse name: Trent Do Number of children: 2    Years of education: None    Highest education level: None   Occupational History    None   Tobacco Use    Smoking status: Never Smoker    Smokeless tobacco: Former User     Types: Chew    Tobacco comment: quit pipe over 30 yrs ago   Vaping Use    Vaping Use: Never assessed   Substance and Sexual Activity    Alcohol use: No     Alcohol/week: 0.0 standard drinks     Comment: quit    Drug use: No    Sexual activity: Yes   Other Topics Concern    None   Social History Narrative    None     Social Determinants of Health     Financial Resource Strain:     Difficulty of Paying Living Expenses:    Food Insecurity:     Worried About Running Out of Food in the Last Year:     [address] in the Last Year:    Transportation Needs:     Lack of Transportation (Medical):  Lack of Transportation (Non-Medical):    Physical Activity:     Days of Exercise per Week:     Minutes of Exercise per Session:    Stress:     Feeling of Stress :    Social Connections:     Frequency of Communication with Friends and Family:     Frequency of Social Gatherings with Friends and Family:     Attends [employer]ian[employer] Services:     Active Member of Clubs or Organizations:     Attends Club or Organization Meetings:     Marital Status:    Intimate Partner Violence:     Fear of Current or Ex-Partner:     Emotionally Abused:     Physically Abused:     Sexually Abused: Allergies: Shellfish-derived products, Pcn [penicillins], Succinylcholine, Morphine, and Tape [adhesive tape]  Medications:       sodium chloride  500 mL Intravenous Once    diphenhydrAMINE  25 mg Intravenous Once    methylPREDNISolone  125 mg Intravenous Once     Prior to Admission medications    Medication Sig Start Date End Date Taking? Authorizing Provider   Fluticasone furoate-vilanterol (BREO ELLIPTA) 200-25 MCG/INH AEPB inhaler Inhale 1 puff into the lungs daily 5/12/21   Equilla MaskSAHIL - ROCIO   oxyCODONE-acetaminophen (PERCOCET)  MG per tablet Take 1 tablet by mouth every 8 hours as needed for Pain for up to 30 days. 5/10/21 6/9/21  SAHIL Chaudhary CNP   rOPINIRole (REQUIP) 0.5 MG tablet Take 1 pill in afternoon and 2 pills at night.  5/3/21   Deepa Han PA-C   amitriptyline (ELAVIL) 10 MG tablet Take 2 tablets by mouth nightly 5/3/21   Deepa Han PA-C   RA ASPIRIN EC 81 MG EC tablet take 1 tablet by mouth once daily 4/19/21   Sahra Cruz MD   oxybutynin (DITROPAN XL) 10 MG extended release tablet Take 1 tablet by mouth daily 4/13/21   Singh Connors MD   tamsulosin [facility]) 0.4 MG capsule Take 1 capsule by mouth nightly 4/13/21 4/13/22  Singh Connors MD   hydrALAZINE (APRESOLINE) 100 MG tablet Take 1 tablet by mouth every 8 hours 4/13/21   SAHIL Bess CNP   sacubitril-valsartan (ENTRESTO) 24-26 MG per tablet Take 1 tablet by mouth 2 times daily 4/13/21   SAHIL Bses CNP   montelukast (SINGULAIR) 10 MG tablet Take 1 tablet by mouth daily 3/26/21   SAHIL Hart CNP   gabapentin (NEURONTIN) 300 MG capsule Take 1 capsule by mouth nightly for 90 days.  3/22/21 6/20/21  SAHIL Jimenez CNP   spironolactone (ALDACTONE) 50 MG tablet Take 1 tablet by mouth 2 times daily 3/15/21   SAHIL Bess CNP   bumetanide (BUMEX) 2 MG tablet Take 1 tablet by mouth daily AND as directed by CHF clinic 3/15/21   SAHIL Bess CNP   potassium chloride (MICRO-K) 10 MEQ extended release capsule Take 2 capsules by mouth daily 3/3/21 5/24/21  Rodney Van MD   levothyroxine (SYNTHROID) 200 MCG tablet Take 1 tablet by mouth Daily 3/3/21   Rodney Van MD   amLODIPine (NORVASC) 10 MG tablet Take 10 mg by mouth daily 1/16/21   Historical Provider, MD   bismuth subsalicylate (PEPTO BISMOL) 262 MG/15ML suspension Take by mouth daily    Historical Provider, MD   glimepiride (AMARYL) 4 MG tablet Take 4 mg by mouth daily 1/16/21   Historical Provider, MD   losartan (COZAAR) 25 MG tablet Take 1 tablet by mouth daily 2/11/21   SAHIL Bess CNP   carvedilol (COREG) 25 MG tablet Take 1 tablet by mouth 2 times daily 7/24/20   Dennise Ramirez MD   acetaminophen (TYLENOL) 500 MG tablet Take 1 tablet by mouth every 6 hours as needed for Pain 4/13/19   Manas Goodwin DO   cyclobenzaprine (FLEXERIL) 10 MG tablet Take 10 mg by mouth 3 times daily as needed for Muscle spasms    Historical Provider, MD albuterol (PROVENTIL HFA;VENTOLIN HFA) 108 (90 BASE) MCG/ACT inhaler Inhale 1 puff into the lungs every 6 hours as needed for Wheezing. Historical Provider, MD   azelastine (ASTELIN) 137 MCG/SPRAY nasal spray 1 spray by Nasal route as needed. Use in each nostril as directed    Historical Provider, MD      PHYSICAL EXAM:    BP (!) 147/79   Pulse 73   Temp 98.4 °F (36.9 °C) (Oral)   Resp 19   Ht 6' 1\" (1.854 m)   Wt (!) 361 lb (163.7 kg)   SpO2 95%   BMI 47.63 kg/m²     General appearance:  No apparent distress, appears stated age and cooperative. HEENT:  Normal cephalic, atraumatic without obvious deformity. Pupils equal, round, and reactive to light. Extra ocular muscles intact. Conjunctivae/corneas clear. Neck: Supple, with full range of motion. no jugular venous distention. Trachea midline. no carotid bruits  Respiratory:  Normal respiratory effort. Clear to auscultation, bilaterally without Rales/Wheezes/Rhonchi. Breath sounds equal bilaterally  Cardiovascular:  Regular rate and rhythm with normal S1/S2 without murmurs, rubs or gallops. PMI non displaced  Abdomen: Soft, non-tender, non-distended with normal bowel sounds. No guarding, rebound. Musculoskeletal:  No clubbing, cyanosis or edema bilaterally. Full range of motion without deformity. Skin: Skin color, texture, turgor normal.  No rashes or lesions, or suspicious lesions. Neurologic:  Neurovascularly intact without any focal sensory/motor deficits. Cranial nerves: II-XII intact, grossly non-focal.  Psychiatric:  Alert and oriented, thought content appropriate, normal insight  Capillary Refill: Brisk,< 2 seconds   Peripheral Pulses: +2 palpable, equal bilaterally upper and lower extremities  Lymphatics: no lymphadenopathy    Data: (All radiographs, tracings, PFTs, and imaging are personally viewed and interpreted unless otherwise noted).       Recent Labs     05/24/21  1114   WBC 8.7   HGB 12.4*   HCT 39.1*         Recent Labs seen lung infiltrates with some minimal haziness in the left lower lobe and right basilar subsegmental atelectasis. **This report has been created using voice recognition software. It may contain minor errors which are inherent in voice recognition technology. ** Final report electronically signed by Dr. Renato Hogue on 5/5/2021 12:30 PM    XR CHEST PORTABLE    Result Date: 5/24/2021  PROCEDURE: XR CHEST PORTABLE CLINICAL INFORMATION: sob. COMPARISON: Chest x-ray dated: May 2021. . TECHNIQUE: AP upright view of the chest. FINDINGS: There is mild cardiomegaly. There is increased pulmonary vascularity. .The mediastinum is not widened. There is slight increased density throughout both lung fields. . No suspicious osseous lesions are present. 1. Interval deterioration since previous study dated 12 May 2021 with increasing cardiomegaly and pulmonary vascular redistribution. These findings may represent changes of congestive heart failure versus fluid overload. . **This report has been created using voice recognition software. It may contain minor errors which are inherent in voice recognition technology. ** Final report electronically signed by DR Sandra Sanabria on 5/24/2021 11:42 AM      Electronically signed by Stiven Fabian DO on 5/24/2021 at 2:28 PM

## 2021-05-25 LAB
ANION GAP SERPL CALCULATED.3IONS-SCNC: 11 MEQ/L (ref 8–16)
BASOPHILS # BLD: 0.1 %
BASOPHILS ABSOLUTE: 0 THOU/MM3 (ref 0–0.1)
BUN BLDV-MCNC: 17 MG/DL (ref 7–22)
CALCIUM SERPL-MCNC: 9.3 MG/DL (ref 8.5–10.5)
CHLORIDE BLD-SCNC: 101 MEQ/L (ref 98–111)
CHOLESTEROL, TOTAL: 152 MG/DL (ref 100–199)
CO2: 25 MEQ/L (ref 23–33)
CREAT SERPL-MCNC: 1.2 MG/DL (ref 0.4–1.2)
EOSINOPHIL # BLD: 0 %
EOSINOPHILS ABSOLUTE: 0 THOU/MM3 (ref 0–0.4)
ERYTHROCYTE [DISTWIDTH] IN BLOOD BY AUTOMATED COUNT: 14.4 % (ref 11.5–14.5)
ERYTHROCYTE [DISTWIDTH] IN BLOOD BY AUTOMATED COUNT: 46.9 FL (ref 35–45)
GFR SERPL CREATININE-BSD FRML MDRD: 62 ML/MIN/1.73M2
GLUCOSE BLD-MCNC: 232 MG/DL (ref 70–108)
GLUCOSE BLD-MCNC: 247 MG/DL (ref 70–108)
GLUCOSE BLD-MCNC: 254 MG/DL (ref 70–108)
GLUCOSE BLD-MCNC: 256 MG/DL (ref 70–108)
GLUCOSE BLD-MCNC: 269 MG/DL (ref 70–108)
GLUCOSE BLD-MCNC: 275 MG/DL (ref 70–108)
HCT VFR BLD CALC: 38.8 % (ref 42–52)
HDLC SERPL-MCNC: 35 MG/DL
HEMOGLOBIN: 12.5 GM/DL (ref 14–18)
IMMATURE GRANS (ABS): 0.06 THOU/MM3 (ref 0–0.07)
IMMATURE GRANULOCYTES: 0.8 %
LDL CHOLESTEROL CALCULATED: 96 MG/DL
LYMPHOCYTES # BLD: 6.4 %
LYMPHOCYTES ABSOLUTE: 0.5 THOU/MM3 (ref 1–4.8)
MAGNESIUM: 2.2 MG/DL (ref 1.6–2.4)
MCH RBC QN AUTO: 29.1 PG (ref 26–33)
MCHC RBC AUTO-ENTMCNC: 32.2 GM/DL (ref 32.2–35.5)
MCV RBC AUTO: 90.4 FL (ref 80–94)
MONOCYTES # BLD: 1.9 %
MONOCYTES ABSOLUTE: 0.1 THOU/MM3 (ref 0.4–1.3)
NUCLEATED RED BLOOD CELLS: 0 /100 WBC
PLATELET # BLD: 243 THOU/MM3 (ref 130–400)
PMV BLD AUTO: 9.2 FL (ref 9.4–12.4)
POTASSIUM SERPL-SCNC: 4.5 MEQ/L (ref 3.5–5.2)
RBC # BLD: 4.29 MILL/MM3 (ref 4.7–6.1)
SEG NEUTROPHILS: 90.8 %
SEGMENTED NEUTROPHILS ABSOLUTE COUNT: 6.6 THOU/MM3 (ref 1.8–7.7)
SODIUM BLD-SCNC: 137 MEQ/L (ref 135–145)
TRIGL SERPL-MCNC: 107 MG/DL (ref 0–199)
WBC # BLD: 7.3 THOU/MM3 (ref 4.8–10.8)

## 2021-05-25 PROCEDURE — 6370000000 HC RX 637 (ALT 250 FOR IP): Performed by: INTERNAL MEDICINE

## 2021-05-25 PROCEDURE — 6360000002 HC RX W HCPCS: Performed by: INTERNAL MEDICINE

## 2021-05-25 PROCEDURE — 94640 AIRWAY INHALATION TREATMENT: CPT

## 2021-05-25 PROCEDURE — 83735 ASSAY OF MAGNESIUM: CPT

## 2021-05-25 PROCEDURE — 85025 COMPLETE CBC W/AUTO DIFF WBC: CPT

## 2021-05-25 PROCEDURE — 82948 REAGENT STRIP/BLOOD GLUCOSE: CPT

## 2021-05-25 PROCEDURE — 99232 SBSQ HOSP IP/OBS MODERATE 35: CPT | Performed by: NURSE PRACTITIONER

## 2021-05-25 PROCEDURE — 80061 LIPID PANEL: CPT

## 2021-05-25 PROCEDURE — 2060000000 HC ICU INTERMEDIATE R&B

## 2021-05-25 PROCEDURE — 80048 BASIC METABOLIC PNL TOTAL CA: CPT

## 2021-05-25 PROCEDURE — 99232 SBSQ HOSP IP/OBS MODERATE 35: CPT | Performed by: INTERNAL MEDICINE

## 2021-05-25 PROCEDURE — 2500000003 HC RX 250 WO HCPCS: Performed by: INTERNAL MEDICINE

## 2021-05-25 PROCEDURE — 94760 N-INVAS EAR/PLS OXIMETRY 1: CPT

## 2021-05-25 PROCEDURE — 36415 COLL VENOUS BLD VENIPUNCTURE: CPT

## 2021-05-25 PROCEDURE — 2580000003 HC RX 258: Performed by: INTERNAL MEDICINE

## 2021-05-25 PROCEDURE — 93307 TTE W/O DOPPLER COMPLETE: CPT

## 2021-05-25 RX ADMIN — HYDRALAZINE HYDROCHLORIDE 100 MG: 50 TABLET, FILM COATED ORAL at 15:17

## 2021-05-25 RX ADMIN — ROPINIROLE HYDROCHLORIDE 0.5 MG: 1 TABLET, FILM COATED ORAL at 15:18

## 2021-05-25 RX ADMIN — OXYCODONE HYDROCHLORIDE AND ACETAMINOPHEN 1 TABLET: 10; 325 TABLET ORAL at 19:50

## 2021-05-25 RX ADMIN — SODIUM CHLORIDE, PRESERVATIVE FREE 10 ML: 5 INJECTION INTRAVENOUS at 09:34

## 2021-05-25 RX ADMIN — ENOXAPARIN SODIUM 60 MG: 60 INJECTION SUBCUTANEOUS at 19:50

## 2021-05-25 RX ADMIN — SODIUM CHLORIDE, PRESERVATIVE FREE 10 ML: 5 INJECTION INTRAVENOUS at 21:34

## 2021-05-25 RX ADMIN — ENOXAPARIN SODIUM 60 MG: 60 INJECTION SUBCUTANEOUS at 06:15

## 2021-05-25 RX ADMIN — SPIRONOLACTONE 50 MG: 25 TABLET ORAL at 21:33

## 2021-05-25 RX ADMIN — IPRATROPIUM BROMIDE AND ALBUTEROL SULFATE 1 AMPULE: .5; 3 SOLUTION RESPIRATORY (INHALATION) at 22:08

## 2021-05-25 RX ADMIN — AMITRIPTYLINE HYDROCHLORIDE 20 MG: 10 TABLET, FILM COATED ORAL at 21:34

## 2021-05-25 RX ADMIN — FAMOTIDINE 20 MG: 20 TABLET, FILM COATED ORAL at 21:34

## 2021-05-25 RX ADMIN — IPRATROPIUM BROMIDE AND ALBUTEROL SULFATE 1 AMPULE: .5; 3 SOLUTION RESPIRATORY (INHALATION) at 07:44

## 2021-05-25 RX ADMIN — HYDRALAZINE HYDROCHLORIDE 100 MG: 50 TABLET, FILM COATED ORAL at 21:33

## 2021-05-25 RX ADMIN — SACUBITRIL AND VALSARTAN 1 TABLET: 24; 26 TABLET, FILM COATED ORAL at 09:34

## 2021-05-25 RX ADMIN — SACUBITRIL AND VALSARTAN 1 TABLET: 24; 26 TABLET, FILM COATED ORAL at 21:34

## 2021-05-25 RX ADMIN — CARVEDILOL 25 MG: 25 TABLET, FILM COATED ORAL at 21:34

## 2021-05-25 RX ADMIN — HYDRALAZINE HYDROCHLORIDE 100 MG: 50 TABLET, FILM COATED ORAL at 06:15

## 2021-05-25 RX ADMIN — IPRATROPIUM BROMIDE AND ALBUTEROL SULFATE 1 AMPULE: .5; 3 SOLUTION RESPIRATORY (INHALATION) at 16:11

## 2021-05-25 RX ADMIN — IPRATROPIUM BROMIDE AND ALBUTEROL SULFATE 1 AMPULE: .5; 3 SOLUTION RESPIRATORY (INHALATION) at 12:00

## 2021-05-25 RX ADMIN — BUMETANIDE 1 MG: 0.25 INJECTION INTRAMUSCULAR; INTRAVENOUS at 15:20

## 2021-05-25 RX ADMIN — TAMSULOSIN HYDROCHLORIDE 0.4 MG: 0.4 CAPSULE ORAL at 21:34

## 2021-05-25 RX ADMIN — SPIRONOLACTONE 50 MG: 25 TABLET ORAL at 09:31

## 2021-05-25 RX ADMIN — MONTELUKAST SODIUM 10 MG: 10 TABLET ORAL at 09:32

## 2021-05-25 RX ADMIN — BUMETANIDE 1 MG: 0.25 INJECTION INTRAMUSCULAR; INTRAVENOUS at 06:14

## 2021-05-25 RX ADMIN — ASPIRIN 81 MG: 81 TABLET, COATED ORAL at 09:32

## 2021-05-25 RX ADMIN — INSULIN LISPRO 4 UNITS: 100 INJECTION, SOLUTION INTRAVENOUS; SUBCUTANEOUS at 13:06

## 2021-05-25 RX ADMIN — FAMOTIDINE 20 MG: 20 TABLET, FILM COATED ORAL at 09:32

## 2021-05-25 RX ADMIN — ROPINIROLE HYDROCHLORIDE 1 MG: 1 TABLET, FILM COATED ORAL at 21:34

## 2021-05-25 RX ADMIN — NITROGLYCERIN 1 INCH: 20 OINTMENT TOPICAL at 06:15

## 2021-05-25 RX ADMIN — CARVEDILOL 25 MG: 25 TABLET, FILM COATED ORAL at 09:32

## 2021-05-25 RX ADMIN — INSULIN LISPRO 6 UNITS: 100 INJECTION, SOLUTION INTRAVENOUS; SUBCUTANEOUS at 09:32

## 2021-05-25 RX ADMIN — GABAPENTIN 300 MG: 300 CAPSULE ORAL at 21:34

## 2021-05-25 RX ADMIN — OXYBUTYNIN CHLORIDE 10 MG: 10 TABLET, EXTENDED RELEASE ORAL at 09:32

## 2021-05-25 RX ADMIN — INSULIN LISPRO 4 UNITS: 100 INJECTION, SOLUTION INTRAVENOUS; SUBCUTANEOUS at 17:45

## 2021-05-25 ASSESSMENT — PAIN DESCRIPTION - PROGRESSION: CLINICAL_PROGRESSION: NOT CHANGED

## 2021-05-25 ASSESSMENT — PAIN DESCRIPTION - PAIN TYPE: TYPE: CHRONIC PAIN

## 2021-05-25 ASSESSMENT — PAIN - FUNCTIONAL ASSESSMENT: PAIN_FUNCTIONAL_ASSESSMENT: PREVENTS OR INTERFERES SOME ACTIVE ACTIVITIES AND ADLS

## 2021-05-25 ASSESSMENT — PAIN SCALES - GENERAL
PAINLEVEL_OUTOF10: 7
PAINLEVEL_OUTOF10: 4

## 2021-05-25 ASSESSMENT — PAIN DESCRIPTION - LOCATION: LOCATION: BACK

## 2021-05-25 ASSESSMENT — PAIN DESCRIPTION - FREQUENCY: FREQUENCY: CONTINUOUS

## 2021-05-25 NOTE — PLAN OF CARE
Problem: Impaired respiratory status  Goal: Clear lung sounds  Outcome: Ongoing  Note: Duoneb to improve breath sounds.

## 2021-05-25 NOTE — PROGRESS NOTES
Nutrition Education    Received c/s- Heart failure diet guidelines    · Verbally reviewed information with Patient  · Educated on low sodium, fluid restriction and CHO counting diet. Reinforced blood sugar goals. Encouraged continued compliance with mediterranean diet (but stressed portion control with carbs). HgbA1c 9.3%   · Written educational materials provided. · Contact name and number provided. · Refer to Patient Education activity for more details.     Electronically signed by Ty Fernandez RD, LD on 5/25/21 at 2:35 PM EDT    Contact: [phone]

## 2021-05-25 NOTE — PLAN OF CARE
Problem: Falls - Risk of:  Goal: Will remain free from falls  Description: Will remain free from falls  Outcome: Ongoing  Goal: Absence of physical injury  Description: Absence of physical injury  Outcome: Ongoing     Problem: Pain:  Goal: Pain level will decrease  Description: Pain level will decrease  Outcome: Ongoing  Goal: Control of acute pain  Description: Control of acute pain  Outcome: Ongoing  Goal: Control of chronic pain  Description: Control of chronic pain  Outcome: Ongoing     Problem: Cardiovascular  Goal: No DVT, peripheral vascular complications  Outcome: Ongoing  Note: On lovenox for prophylaxis, no dvt s/s at this time  Goal: Hemodynamic stability  Outcome: Ongoing  Note:   Vitals:    05/24/21 2045   BP: 130/62   Pulse: 72   Resp: 18   Temp: 98.3 °F (36.8 °C)   SpO2: 93%       Goal: Anticoagulate/Hct stable  Outcome: Ongoing  Goal: Agreement to quit smoking  Outcome: Ongoing  Goal: Weight maintained or lost  Outcome: Ongoing  Goal: Understanding of dietary restrictions  Outcome: Ongoing  Note: Cardiac carb controlled diet, patient able to provide examples of what is and what is not included     Problem: Nutrition  Goal: Optimal nutrition therapy  Outcome: Ongoing  Goal: Understanding of nutritional guidelines  Outcome: Ongoing     Problem: Skin Integrity/Risk  Goal: No skin breakdown during hospitalization  Outcome: Ongoing  Note: Patient turns self, education and pillow support provided.

## 2021-05-25 NOTE — PLAN OF CARE
Ongoing  Note: Duoneb to improve breath sounds. Care plan reviewed with patient. Patient verbalizes understanding of the plan of care and contributes to goal setting.

## 2021-05-25 NOTE — CARE COORDINATION
5/25/21, 9:40 AM EDT  DISCHARGE PLANNING EVALUATION:    Danuta Vilchis       Admitted: 5/24/2021/ [address]27 day: 1   Location: -12/012-A Reason for admit: CHF with unknown LVEF ([facility] [address] [I50.9]   PMH:  has a past medical history of Arthritis, Back problem, CHF with unknown LVEF ([address] Constipation, Diabetes mellitus ([facility] [address] Hypertension, Sleep apnea, and Thyroid disease. Procedure:   5/24 CXR - Interval deterioration since previous study dated 12 May 2021 with increasing cardiomegaly and pulmonary vascular redistribution. These findings may represent changes of congestive heart failure versus fluid overload. 5/25 Echo - to be done. Barriers to Discharge:  Admitted through ED with chest pain and SOB. .3. Troponins negative. [address] Cardiology. Bumex iv q12hr. Lovenox. Hydralazine po q8hr. DuoNebs q4hr [employer]. Diabetes management. Electrolyte replacements. Entresto. Aldactone. Given iv Lasix x1. PCP: Tevin Carrera DO  Readmission Risk Score: 21%    Patient Goals/Plan/Treatment Preferences: Spoke with pt. He lives at home with his wife and 2 grandkids (ages 10 and 8). Pt is independent and drives. He has a cane, Nebulizer (no medications for) and a CPAP at home. He is current with his PCP, [facility]. Denies any HH needs. Monitor for needs. Transportation/Food Security/Housekeeping Addressed:  No issues identified.

## 2021-05-25 NOTE — PROGRESS NOTES
Hospitalist      Patient:  Pietro Seaman    Unit/Bed:4B-12/012-A  YOB: 1960  MRN: [id number]   Acct: [de-identified]     PCP: Cristine Vogel DO  Date of Admission: 5/24/2021        Assessment and Plan:        1. Chest pain: We will trend troponins, obtain serial EKGs, echocardiogram, resume home meds, cardiology consulted, Nitropaste added  2. Shortness of breath: We will obtain CT with and without contrast of chest, rule out pleural effusion versus pneumonia versus volume overload versus pulmonary embolism, will also obtain procalcitonin  3. Acute congestive heart failure diastolic: We will diurese, obtain echo, will trend BNP, Nitropaste  4. Obstructive sleep apnea on BiPAP we will continue home settings  5. Diabetes mellitus we will place on carb controlled diet along with a 2 g sodium diet, insulin sliding scale, check a hemoglobin A1c and urine for microalbumin  6. Chronic kidney disease stage II: will monitor with serial BMPs and creatinine  7. History of pulmonary hypertension. 8. Cardiomyopathy: EF 55 to 60%  9. Morbidly obese    5.25.2021 patient seen this a.m., eating breakfast, states he really feels much improved. Denies any chest pain at this time. Echocardiogram is pending, will continue gentle diuresing. Discussed with patient diet modifications and fluid restriction. CC: Chest pain shortness of breath    HPI: 60-year-old white male obese who presents for chest pain and shortness of breath. Patient was sent to the ED from Dr. David Gordon office this morning patient reports shortness of breath and left-sided chest pain that radiates to his back onset 4 days ago while at rest.  Patient describes the pain as stabbing and rates the pain as 10 out of 10. He denies taking anything for the pain. He reports shortness of breath is worse with with exertion describes as a truck sitting on his chest and states he was unable to perform yard work as he usually does.   He denies having chest pain or shortness of breath similar to this before but that this is worse than previous. He reports chills and diaphoresis this morning and constipation for 3 days. He reports she is not taken any of his medications today. He denies smoking alcohol or illicit drug use. He denies headache lightheadedness dizziness nausea vomiting diarrhea leg swelling leg pain fever abdominal pain. Patient has a past medical history of arthritis back problems constipation diabetes mellitus hypertension sleep apnea and thyroid disease    ROS (14 point review of systems completed. Pertinent positives noted.  Otherwise ROS is negative) : Chills, diaphoresis, shortness of breath, chest pain, constipation    PMH:  Per HPI and       Diagnosis Date    Arthritis     Back problem     CHF with unknown LVEF ([facility] [address] 5/24/2021    Constipation     Diabetes mellitus ([facility] [address]     Hypertension     Sleep apnea     has CPAP    Thyroid disease      SHX:        Procedure Laterality Date    ABDOMEN SURGERY      APPENDECTOMY      CARPAL TUNNEL RELEASE Bilateral     COLONOSCOPY  2017    Dr. Ba Larsen, [address]      x3 surgeries with 14 repairs    KNEE SURGERY Right 1980's    cartilage    SC EXPLORATORY OF ABDOMEN N/A 2/5/2018    ABDOMINAL EXPLORATION WITH LYSIS OF COMPLICATED ADHESIONS WITH AN EXTENDED RIGHT COLON RESECTION performed by Goldie Lawrence MD at [facility]:       Problem Relation Age of Onset    Cancer Mother         breast    Heart Disease Father         bladder, lung    Cancer Father     Stroke Brother     Heart Attack Brother     Prostate Cancer Brother     Diabetes Paternal Grandmother     Arthritis Brother     High Blood Pressure Neg Hx      SOCHX:   Social History     Socioeconomic History    Marital status:      Spouse name: Yasmine Lawson Number of children: 2    Years of education: None    Highest education level: None   Occupational History    None   Tobacco Use  Smoking status: Never Smoker    Smokeless tobacco: Former User     Types: Chew    Tobacco comment: quit pipe over 30 yrs ago   Vaping Use    Vaping Use: Never assessed   Substance and Sexual Activity    Alcohol use: No     Alcohol/week: 0.0 standard drinks     Comment: quit    Drug use: No    Sexual activity: Yes   Other Topics Concern    None   Social History Narrative    None     Social Determinants of Health     Financial Resource Strain:     Difficulty of Paying Living Expenses:    Food Insecurity:     Worried About Running Out of Food in the Last Year:     Ran Out of Food in the Last Year:    Transportation Needs:     Lack of Transportation (Medical):  Lack of Transportation (Non-Medical):    Physical Activity:     Days of Exercise per Week:     Minutes of Exercise per Session:    Stress:     Feeling of Stress :    Social Connections:     Frequency of Communication with Friends and Family:     Frequency of Social Gatherings with Friends and Family:     Attends Sabianism Services:     Active Member of Clubs or Organizations:     Attends Club or Organization Meetings:     Marital Status:    Intimate Partner Violence:     Fear of Current or Ex-Partner:     Emotionally Abused:     Physically Abused:     Sexually Abused:        Allergies: Shellfish-derived products, Pcn [penicillins], Succinylcholine, Morphine, and Tape [adhesive tape]  Medications:     dextrose      sodium chloride        amitriptyline  20 mg Oral Nightly    carvedilol  25 mg Oral BID    gabapentin  300 mg Oral Nightly    hydrALAZINE  100 mg Oral 3 times per day    montelukast  10 mg Oral Daily    oxybutynin  10 mg Oral Daily    aspirin  81 mg Oral Daily    sacubitril-valsartan  1 tablet Oral BID    spironolactone  50 mg Oral BID    tamsulosin  0.4 mg Oral Nightly    sodium chloride flush  5-40 mL Intravenous 2 times per day    famotidine  20 mg Oral BID    insulin lispro  0-12 Units Subcutaneous TID     insulin lispro  0-6 Units Subcutaneous Nightly    bumetanide  1 mg Intravenous Q12H    nitroglycerin  1 inch Topical 4 times per day    ipratropium-albuterol  1 ampule Inhalation Q4H WA    levothyroxine  200 mcg Oral Daily    rOPINIRole  0.5 mg Oral Lunch    rOPINIRole  1 mg Oral Nightly    enoxaparin  60 mg Subcutaneous Q12H     Prior to Admission medications    Medication Sig Start Date End Date Taking? Authorizing Provider   Fluticasone furoate-vilanterol (BREO ELLIPTA) 200-25 MCG/INH AEPB inhaler Inhale 1 puff into the lungs daily 5/12/21  Yes SAHIL Stubbs CNP   oxyCODONE-acetaminophen (PERCOCET)  MG per tablet Take 1 tablet by mouth every 8 hours as needed for Pain for up to 30 days. 5/10/21 6/9/21 Yes SAHIL Ballesteros CNP   rOPINIRole (REQUIP) 0.5 MG tablet Take 1 pill in afternoon and 2 pills at night. 5/3/21  Yes Delmer Kayser, PA-C   amitriptyline (ELAVIL) 10 MG tablet Take 2 tablets by mouth nightly 5/3/21  Yes Delmer Kayser, PA-C   RA ASPIRIN EC 81 MG EC tablet take 1 tablet by mouth once daily 4/19/21  Yes Hollice Collet, MD   oxybutynin (DITROPAN XL) 10 MG extended release tablet Take 1 tablet by mouth daily 4/13/21  Yes Tano Brown MD   tamsulosin [facility]) 0.4 MG capsule Take 1 capsule by mouth nightly 4/13/21 4/13/22 Yes Tano Brown MD   hydrALAZINE (APRESOLINE) 100 MG tablet Take 1 tablet by mouth every 8 hours 4/13/21  Yes SAHIL Harding CNP   sacubitril-valsartan (ENTRESTO) 24-26 MG per tablet Take 1 tablet by mouth 2 times daily 4/13/21  Yes SAHIL Harding CNP   montelukast (SINGULAIR) 10 MG tablet Take 1 tablet by mouth daily 3/26/21  Yes SAHIL Stubbs CNP   gabapentin (NEURONTIN) 300 MG capsule Take 1 capsule by mouth nightly for 90 days.  3/22/21 6/20/21 Yes SAHIL Ballesteros CNP   spironolactone (ALDACTONE) 50 MG tablet Take 1 tablet by mouth 2 times daily 3/15/21  Yes SAHIL Harding CNP   bumetanide (BUMEX) 2 MG tablet Take 1 tablet by mouth daily AND as directed by [facility] 3/15/21  Yes SAHIL Page CNP   potassium chloride (MICRO-K) 10 MEQ extended release capsule Take 2 capsules by mouth daily 3/3/21 5/24/21 Yes Gabe Arndt MD   levothyroxine (SYNTHROID) 200 MCG tablet Take 1 tablet by mouth Daily 3/3/21  Yes Gabe Arndt MD   amLODIPine (NORVASC) 10 MG tablet Take 10 mg by mouth daily 1/16/21  Yes Historical Provider, MD   glimepiride (AMARYL) 4 MG tablet Take 4 mg by mouth daily 1/16/21  Yes Historical Provider, MD   losartan (COZAAR) 25 MG tablet Take 1 tablet by mouth daily 2/11/21  Yes SAHIL Page CNP   carvedilol (COREG) 25 MG tablet Take 1 tablet by mouth 2 times daily 7/24/20  Yes Johny Badillo MD   cyclobenzaprine (FLEXERIL) 10 MG tablet Take 10 mg by mouth 3 times daily as needed for Muscle spasms   Yes Historical Provider, MD   albuterol (PROVENTIL HFA;VENTOLIN HFA) 108 (90 BASE) MCG/ACT inhaler Inhale 1 puff into the lungs every 6 hours as needed for Wheezing. Yes Historical Provider, MD   bismuth subsalicylate (PEPTO BISMOL) 262 MG/15ML suspension Take by mouth daily    Historical Provider, MD   acetaminophen (TYLENOL) 500 MG tablet Take 1 tablet by mouth every 6 hours as needed for Pain 4/13/19   Danae Linares,    azelastine (ASTELIN) 137 MCG/SPRAY nasal spray 1 spray by Nasal route as needed. Use in each nostril as directed    Historical Provider, MD      PHYSICAL EXAM:    /61   Pulse 74   Temp 97.7 °F (36.5 °C) (Oral)   Resp 18   Ht 6' 1\" (1.854 m)   Wt (!) 351 lb 3.2 oz (159.3 kg)   SpO2 95%   BMI 46.34 kg/m²     General appearance:  No apparent distress, appears stated age and cooperative. HEENT:  Normal cephalic, atraumatic without obvious deformity. Pupils equal, round, and reactive to light. Extra ocular muscles intact. Conjunctivae/corneas clear. Neck: Supple, with full range of motion. no jugular venous distention. Trachea midline. no carotid bruits  Respiratory:  Normal respiratory effort. Clear to auscultation, bilaterally without Rales/Wheezes/Rhonchi. Breath sounds equal bilaterally  Cardiovascular:  Regular rate and rhythm with normal S1/S2 without murmurs, rubs or gallops. PMI non displaced  Abdomen: Soft, non-tender, non-distended with normal bowel sounds. No guarding, rebound. Musculoskeletal:  No clubbing, cyanosis or edema bilaterally. Full range of motion without deformity. Skin: Skin color, texture, turgor normal.  No rashes or lesions, or suspicious lesions. Neurologic:  Neurovascularly intact without any focal sensory/motor deficits. Cranial nerves: II-XII intact, grossly non-focal.  Psychiatric:  Alert and oriented, thought content appropriate, normal insight  Capillary Refill: Brisk,< 2 seconds   Peripheral Pulses: +2 palpable, equal bilaterally upper and lower extremities  Lymphatics: no lymphadenopathy    Data: (All radiographs, tracings, PFTs, and imaging are personally viewed and interpreted unless otherwise noted).    Recent Labs     05/24/21  1114 05/25/21  0340   WBC 8.7 7.3   HGB 12.4* 12.5*   HCT 39.1* 38.8*    243     Recent Labs     05/24/21  1114 05/25/21  0340    137   K 4.6 4.5    101   CO2 25 25   BUN 12 17   CREATININE 1.1 1.2   CALCIUM 9.1 9.3     No results for input(s): AST, ALT, BILIDIR, BILITOT, ALKPHOS in the last 72 hours. No results for input(s): INR in the last 72 hours. No results for input(s): Ethelene Soulier in the last 72 hours. Radiology reports-   XR CHEST (2 VW)    Result Date: 5/12/2021  PROCEDURE: XR CHEST (2 VW) CLINICAL INFORMATION: Chest discomfort COMPARISON: 3/2/2021 TECHNIQUE: PA and lateral views of the chest were obtained. 1. Mild cardiomegaly. 2. No acute findings. No infiltrates or effusions are seen. **This report has been created using voice recognition software. It may contain minor errors which are inherent in voice recognition technology. ** Final report electronically signed by Dr. Kareen Glasgow on 5/12/2021 12:27 PM    CT CHEST WO CONTRAST    Result Date: 5/5/2021  PROCEDURE: CT CHEST WO CONTRAST CLINICAL INFORMATION: Ground glass opacity present on imaging of lung . COMPARISON: 3/2/2021 TECHNIQUE: 2-D multiplanar noncontrast CT chest All CT scans at this facility use dose modulation, iterative reconstruction, and/or weight-based dosing when appropriate to reduce radiation dose to as low as reasonably achievable. FINDINGS: No mediastinal or hilar adenopathy. Coronary artery calcifications are present. Heart size is normal. There is no axillary lymphadenopathy. There is bilateral gynecomastia. There is some minimal residual haziness in the left base there is dependent atelectasis at the right base. Minimal residual pleural effusions. The groundglass opacities and infiltrates seen in the upper lobes on the prior exam has completely resolved. Upper abdomen There is a very subtle ovoid low-density lesion contiguous with the posterior aspect of the right adrenal gland and right border of the liver. Hounsfield units would suggest a benign adenoma. No suspicious bone lesions. Almost complete resolution of previously seen lung infiltrates with some minimal haziness in the left lower lobe and right basilar subsegmental atelectasis. **This report has been created using voice recognition software. It may contain minor errors which are inherent in voice recognition technology. ** Final report electronically signed by Dr. Veronica Pendleton on 5/5/2021 12:30 PM    XR CHEST PORTABLE    Result Date: 5/24/2021  PROCEDURE: XR CHEST PORTABLE CLINICAL INFORMATION: sob. COMPARISON: Chest x-ray dated: May 2021. . TECHNIQUE: AP upright view of the chest. FINDINGS: There is mild cardiomegaly. There is increased pulmonary vascularity. .The mediastinum is not widened. There is slight increased density throughout both lung fields. . No suspicious osseous lesions are present.      1. Interval deterioration since previous study dated 12 May 2021 with increasing cardiomegaly and pulmonary vascular redistribution. These findings may represent changes of congestive heart failure versus fluid overload. . **This report has been created using voice recognition software. It may contain minor errors which are inherent in voice recognition technology. ** Final report electronically signed by DR Isiah Kingston on 5/24/2021 11:42 AM      Electronically signed by Cyndy Lane DO on 5/25/2021 at 11:50 AM

## 2021-05-25 NOTE — PROGRESS NOTES
rhythm, normal S1 and S2, no murmurs, rubs, clicks, or gallops, distal pulses intact,  Pulmonary/Chest: clear to auscultation bilaterally- no wheezes, rales or rhonchi, normal air movement, no respiratory distress  Abdomen: soft, non-tender, non-distended, normal bowel sounds, no masses Extremities: no cyanosis, clubbing - 2+ pitting Knees to feet edema, pulses present    Skin: warm and dry, no rash or erythema  Musculoskeletal: normal range of motion, no joint swelling, deformity or tenderness  Neurological: alert, oriented, normal speech, no focal findings or movement disorder noted    Medications:    amitriptyline  20 mg Oral Nightly    carvedilol  25 mg Oral BID    gabapentin  300 mg Oral Nightly    hydrALAZINE  100 mg Oral 3 times per day    montelukast  10 mg Oral Daily    oxybutynin  10 mg Oral Daily    aspirin  81 mg Oral Daily    sacubitril-valsartan  1 tablet Oral BID    spironolactone  50 mg Oral BID    tamsulosin  0.4 mg Oral Nightly    sodium chloride flush  5-40 mL Intravenous 2 times per day    famotidine  20 mg Oral BID    insulin lispro  0-12 Units Subcutaneous TID WC    insulin lispro  0-6 Units Subcutaneous Nightly    bumetanide  1 mg Intravenous Q12H    nitroglycerin  1 inch Topical 4 times per day    ipratropium-albuterol  1 ampule Inhalation Q4H WA    levothyroxine  200 mcg Oral Daily    rOPINIRole  0.5 mg Oral Lunch    rOPINIRole  1 mg Oral Nightly    enoxaparin  60 mg Subcutaneous Q12H      dextrose      sodium chloride       oxyCODONE-acetaminophen, 1 tablet, Q8H PRN  glucose, 15 g, PRN  dextrose, 12.5 g, PRN  glucagon (rDNA), 1 mg, PRN  dextrose, 100 mL/hr, PRN  sodium chloride flush, 5-40 mL, PRN  sodium chloride, 25 mL, PRN  promethazine, 12.5 mg, Q6H PRN   Or  ondansetron, 4 mg, Q6H PRN  polyethylene glycol, 17 g, Daily PRN  acetaminophen, 650 mg, Q6H PRN   Or  acetaminophen, 650 mg, Q6H PRN  magnesium sulfate, 2,000 mg, PRN  potassium chloride, 40 mEq, PRN 25.  2.  RV is 80/23, 27.  3.  PA is 82/44, 59.  4.  Wedge pressure is 35.  5.  LV is 188/28, 36.  6.  AO was 205/126, 160.  7.  AO saturation is 91%. 8.  PA saturation is 63%. 9.  Cardiac output by Cherelle method is 5.9. 10.  Cardiac index is 2.1.     CORONARY ANATOMY:  1. Right coronary artery is a dominant vessel. The vessel is quite  large and has mild luminal irregularities in the proximal, mid, and  distal portions of the vessel; otherwise, it is patent. 2.  Left main is patent, gives rise to LAD and left circumflex arteries. 3.  Left circumflex artery is patent without any significant disease. 4. LAD is patent in the proximal portion, mid portion there is a 30% to  40% stenosis followed by mild luminal irregularities in the distal  portion of the LAD. 5.  LV gram was not performed due to renal dysfunction.     The patient tolerated the procedure well without any significant issues  or complications. Hemostasis was achieved with a Vasc Band device.     SUMMARY:  1. Elevated right heart pressures with elevated LVEPD. 2.  Patent coronaries.     RECOMMENDATIONS:  1.  IV diuresis. 2.  Monitor electrolytes. 3.  Optimize heart failure therapy. 4.  Weight loss advised. 5.  Optimize sleep apnea therapies. 6.  Follow up in clinic in one to two weeks to evaluate symptoms  further.           Karon Rodríguez MD     D: 12/06/2020     Lab Data:    Cardiac Enzymes:  No results for input(s): CKTOTAL, CKMB, CKMBINDEX, TROPONINI in the last 72 hours.     CBC:   Lab Results   Component Value Date    WBC 7.3 05/25/2021    RBC 4.29 05/25/2021    RBC 5.15 08/12/2011    HGB 12.5 05/25/2021    HCT 38.8 05/25/2021     05/25/2021       CMP:    Lab Results   Component Value Date     05/25/2021    K 4.5 05/25/2021    K 2.9 12/01/2020     05/25/2021    CO2 25 05/25/2021    BUN 17 05/25/2021    CREATININE 1.2 05/25/2021    LABGLOM 62 05/25/2021    GLUCOSE 256 05/25/2021    CALCIUM 9.3 05/25/2021 Hepatic Function Panel:    Lab Results   Component Value Date    ALKPHOS 76 04/15/2021    ALT 47 04/15/2021    AST 41 04/15/2021    PROT 8.3 04/15/2021    BILITOT 0.5 04/15/2021    BILIDIR <0.2 03/02/2021    LABALBU 4.4 04/15/2021       Magnesium:    Lab Results   Component Value Date    MG 2.2 05/25/2021       PT/INR:    Lab Results   Component Value Date    INR 1.06 12/01/2020       HgBA1c:    Lab Results   Component Value Date    LABA1C 9.3 05/24/2021       FLP:    Lab Results   Component Value Date    TRIG 107 05/25/2021    HDL 35 05/25/2021    LDLCALC 96 05/25/2021       TSH:    Lab Results   Component Value Date    TSH 3.090 04/15/2021         Assessment:  Acute on chronic diastolic chf / RHF    PHTN- severe     Chest pain - atypical - negative trop  Nonobstructive CAD by cath 12/2020  Preserved EF     HTN  HLP  DM II - uncontrolled A1C 9.3    Hx deisy      Plan:    Continue diuresis     Salt restriction 2 gm ( 2,000 mg) daily   Fluid restriction 2 L daily    Weigh yourself daily: Should you gain 2-3 pounds in 1 days time or 3-5 pounds in 2 days time-- call our CHF office ([phone]) for further recommendations      CHF guidelines:  BB: yes  ACE / ARB/ entresto: restart entresto after diuresed  Diuretics: yes  Aldactone: yes  Farxiga: check with insurance  Current with CHF clinic   ? verquvo pt            Electronically signed by SAHIL Akers CNP on 5/25/2021 at 1:12 PM

## 2021-05-25 NOTE — PROGRESS NOTES
Patient known to CHF clinic   Re-educated patient regarding heart failure management by explaining the importance of obtaining daily weights at the same time every day with approximately the same amount of clothing, how to recognize symptoms, low sodium diet and taking prescribed medications as ordered. Patient was given our heart failure zone sheet, a weight chart and a low sodium diet sheet. Patient was receptive to the education given and verbalized understanding.      OP CHF clinic appointment scheduled

## 2021-05-25 NOTE — ACP (ADVANCE CARE PLANNING)
Advance Care Planning     Advance Care Planning Inpatient Note  Spiritual Care Department    Today's Date: 5/25/2021  Unit: [facility]    Received request from IDT Member. Upon review of chart and communication with care team, patient's decision making abilities are not in question. Patientwas/were present in the room during visit. Goals of ACP Conversation:      Health Care Decision Makers:      Primary Decision Maker: Pam Badillo Spouse - [phone]  Click here to complete [address] including selection of the Healthcare Decision Maker Relationship (ie \"Primary\")     Today we:  Next of Kin, per patient report    Advance Care Planning Documents (Patient Wishes):  Healthcare Power of /Advance Directive Appointment of Health Care Agent  Living Will/Advance Directive     Assessment:  Pt wanted it completed at the time of the visit and without hesitation it was completed It was very much appreciated. assessment narrative, address the way holistic dimensions influence ACP decisions - medical, psychological, psychosocial, family systems, ethical, cultural, societal and spiritual:[id number]}    Interventions:  Assisted in the completion of documents according to patient's wishes at this time. Returned original document(s) to patient, as well as copies for distribution to appointed agents. Outcomes/Plan:  New advance directive completed.     Electronically signed by Ruby Carr, [id number] on 5/25/2021 at 1:28 PM

## 2021-05-26 LAB
ANION GAP SERPL CALCULATED.3IONS-SCNC: 10 MEQ/L (ref 8–16)
BASOPHILS # BLD: 0.2 %
BASOPHILS ABSOLUTE: 0 THOU/MM3 (ref 0–0.1)
BUN BLDV-MCNC: 28 MG/DL (ref 7–22)
CALCIUM SERPL-MCNC: 9.6 MG/DL (ref 8.5–10.5)
CHLORIDE BLD-SCNC: 101 MEQ/L (ref 98–111)
CO2: 26 MEQ/L (ref 23–33)
CREAT SERPL-MCNC: 1.4 MG/DL (ref 0.4–1.2)
EOSINOPHIL # BLD: 0.5 %
EOSINOPHILS ABSOLUTE: 0.1 THOU/MM3 (ref 0–0.4)
ERYTHROCYTE [DISTWIDTH] IN BLOOD BY AUTOMATED COUNT: 14.4 % (ref 11.5–14.5)
ERYTHROCYTE [DISTWIDTH] IN BLOOD BY AUTOMATED COUNT: 46.6 FL (ref 35–45)
GFR SERPL CREATININE-BSD FRML MDRD: 52 ML/MIN/1.73M2
GLUCOSE BLD-MCNC: 150 MG/DL (ref 70–108)
GLUCOSE BLD-MCNC: 153 MG/DL (ref 70–108)
GLUCOSE BLD-MCNC: 175 MG/DL (ref 70–108)
GLUCOSE BLD-MCNC: 187 MG/DL (ref 70–108)
GLUCOSE BLD-MCNC: 207 MG/DL (ref 70–108)
HCT VFR BLD CALC: 37.8 % (ref 42–52)
HEMOGLOBIN: 12.1 GM/DL (ref 14–18)
IMMATURE GRANS (ABS): 0.07 THOU/MM3 (ref 0–0.07)
IMMATURE GRANULOCYTES: 0.6 %
LYMPHOCYTES # BLD: 9.8 %
LYMPHOCYTES ABSOLUTE: 1.2 THOU/MM3 (ref 1–4.8)
MAGNESIUM: 2.4 MG/DL (ref 1.6–2.4)
MCH RBC QN AUTO: 28.8 PG (ref 26–33)
MCHC RBC AUTO-ENTMCNC: 32 GM/DL (ref 32.2–35.5)
MCV RBC AUTO: 90 FL (ref 80–94)
MONOCYTES # BLD: 10.7 %
MONOCYTES ABSOLUTE: 1.3 THOU/MM3 (ref 0.4–1.3)
NUCLEATED RED BLOOD CELLS: 0 /100 WBC
PLATELET # BLD: 297 THOU/MM3 (ref 130–400)
PMV BLD AUTO: 9.6 FL (ref 9.4–12.4)
POTASSIUM SERPL-SCNC: 4.2 MEQ/L (ref 3.5–5.2)
RBC # BLD: 4.2 MILL/MM3 (ref 4.7–6.1)
SEG NEUTROPHILS: 78.2 %
SEGMENTED NEUTROPHILS ABSOLUTE COUNT: 9.5 THOU/MM3 (ref 1.8–7.7)
SODIUM BLD-SCNC: 137 MEQ/L (ref 135–145)
WBC # BLD: 12.1 THOU/MM3 (ref 4.8–10.8)

## 2021-05-26 PROCEDURE — 6370000000 HC RX 637 (ALT 250 FOR IP): Performed by: INTERNAL MEDICINE

## 2021-05-26 PROCEDURE — 2060000000 HC ICU INTERMEDIATE R&B

## 2021-05-26 PROCEDURE — 94640 AIRWAY INHALATION TREATMENT: CPT

## 2021-05-26 PROCEDURE — 80048 BASIC METABOLIC PNL TOTAL CA: CPT

## 2021-05-26 PROCEDURE — 94760 N-INVAS EAR/PLS OXIMETRY 1: CPT

## 2021-05-26 PROCEDURE — 82948 REAGENT STRIP/BLOOD GLUCOSE: CPT

## 2021-05-26 PROCEDURE — 83735 ASSAY OF MAGNESIUM: CPT

## 2021-05-26 PROCEDURE — 2580000003 HC RX 258: Performed by: INTERNAL MEDICINE

## 2021-05-26 PROCEDURE — 36415 COLL VENOUS BLD VENIPUNCTURE: CPT

## 2021-05-26 PROCEDURE — 99232 SBSQ HOSP IP/OBS MODERATE 35: CPT | Performed by: NURSE PRACTITIONER

## 2021-05-26 PROCEDURE — 6360000002 HC RX W HCPCS: Performed by: INTERNAL MEDICINE

## 2021-05-26 PROCEDURE — 85025 COMPLETE CBC W/AUTO DIFF WBC: CPT

## 2021-05-26 PROCEDURE — 2500000003 HC RX 250 WO HCPCS: Performed by: INTERNAL MEDICINE

## 2021-05-26 PROCEDURE — 99232 SBSQ HOSP IP/OBS MODERATE 35: CPT | Performed by: FAMILY MEDICINE

## 2021-05-26 PROCEDURE — 6370000000 HC RX 637 (ALT 250 FOR IP): Performed by: FAMILY MEDICINE

## 2021-05-26 RX ORDER — DIPHENHYDRAMINE HCL 25 MG
25 TABLET ORAL EVERY 8 HOURS PRN
Status: DISCONTINUED | OUTPATIENT
Start: 2021-05-26 | End: 2021-05-27 | Stop reason: HOSPADM

## 2021-05-26 RX ADMIN — ASPIRIN 81 MG: 81 TABLET, COATED ORAL at 07:53

## 2021-05-26 RX ADMIN — DIPHENHYDRAMINE HCL 25 MG: 25 TABLET ORAL at 16:08

## 2021-05-26 RX ADMIN — IPRATROPIUM BROMIDE AND ALBUTEROL SULFATE 1 AMPULE: .5; 3 SOLUTION RESPIRATORY (INHALATION) at 16:30

## 2021-05-26 RX ADMIN — AMITRIPTYLINE HYDROCHLORIDE 20 MG: 10 TABLET, FILM COATED ORAL at 21:32

## 2021-05-26 RX ADMIN — SPIRONOLACTONE 50 MG: 25 TABLET ORAL at 07:53

## 2021-05-26 RX ADMIN — HYDRALAZINE HYDROCHLORIDE 100 MG: 50 TABLET, FILM COATED ORAL at 21:43

## 2021-05-26 RX ADMIN — INSULIN LISPRO 2 UNITS: 100 INJECTION, SOLUTION INTRAVENOUS; SUBCUTANEOUS at 12:29

## 2021-05-26 RX ADMIN — SODIUM CHLORIDE, PRESERVATIVE FREE 10 ML: 5 INJECTION INTRAVENOUS at 21:32

## 2021-05-26 RX ADMIN — LEVOTHYROXINE SODIUM 200 MCG: 0.1 TABLET ORAL at 06:04

## 2021-05-26 RX ADMIN — HYDRALAZINE HYDROCHLORIDE 100 MG: 50 TABLET, FILM COATED ORAL at 06:04

## 2021-05-26 RX ADMIN — FAMOTIDINE 20 MG: 20 TABLET, FILM COATED ORAL at 08:58

## 2021-05-26 RX ADMIN — GABAPENTIN 300 MG: 300 CAPSULE ORAL at 21:31

## 2021-05-26 RX ADMIN — OXYCODONE HYDROCHLORIDE AND ACETAMINOPHEN 1 TABLET: 10; 325 TABLET ORAL at 21:32

## 2021-05-26 RX ADMIN — HYDRALAZINE HYDROCHLORIDE 100 MG: 50 TABLET, FILM COATED ORAL at 12:34

## 2021-05-26 RX ADMIN — CARVEDILOL 25 MG: 25 TABLET, FILM COATED ORAL at 07:52

## 2021-05-26 RX ADMIN — ROPINIROLE HYDROCHLORIDE 1 MG: 1 TABLET, FILM COATED ORAL at 21:32

## 2021-05-26 RX ADMIN — MONTELUKAST SODIUM 10 MG: 10 TABLET ORAL at 07:52

## 2021-05-26 RX ADMIN — ENOXAPARIN SODIUM 60 MG: 60 INJECTION SUBCUTANEOUS at 06:05

## 2021-05-26 RX ADMIN — IPRATROPIUM BROMIDE AND ALBUTEROL SULFATE 1 AMPULE: .5; 3 SOLUTION RESPIRATORY (INHALATION) at 13:01

## 2021-05-26 RX ADMIN — BUMETANIDE 1 MG: 0.25 INJECTION INTRAMUSCULAR; INTRAVENOUS at 03:31

## 2021-05-26 RX ADMIN — INSULIN LISPRO 2 UNITS: 100 INJECTION, SOLUTION INTRAVENOUS; SUBCUTANEOUS at 08:58

## 2021-05-26 RX ADMIN — OXYCODONE HYDROCHLORIDE AND ACETAMINOPHEN 1 TABLET: 10; 325 TABLET ORAL at 12:48

## 2021-05-26 RX ADMIN — TAMSULOSIN HYDROCHLORIDE 0.4 MG: 0.4 CAPSULE ORAL at 21:32

## 2021-05-26 RX ADMIN — INSULIN LISPRO 2 UNITS: 100 INJECTION, SOLUTION INTRAVENOUS; SUBCUTANEOUS at 18:09

## 2021-05-26 RX ADMIN — ENOXAPARIN SODIUM 60 MG: 60 INJECTION SUBCUTANEOUS at 21:31

## 2021-05-26 RX ADMIN — FAMOTIDINE 20 MG: 20 TABLET, FILM COATED ORAL at 21:32

## 2021-05-26 RX ADMIN — OXYCODONE HYDROCHLORIDE AND ACETAMINOPHEN 1 TABLET: 10; 325 TABLET ORAL at 04:03

## 2021-05-26 RX ADMIN — IPRATROPIUM BROMIDE AND ALBUTEROL SULFATE 1 AMPULE: .5; 3 SOLUTION RESPIRATORY (INHALATION) at 20:48

## 2021-05-26 RX ADMIN — SODIUM CHLORIDE, PRESERVATIVE FREE 10 ML: 5 INJECTION INTRAVENOUS at 12:35

## 2021-05-26 RX ADMIN — SACUBITRIL AND VALSARTAN 1 TABLET: 24; 26 TABLET, FILM COATED ORAL at 21:32

## 2021-05-26 RX ADMIN — OXYBUTYNIN CHLORIDE 10 MG: 10 TABLET, EXTENDED RELEASE ORAL at 07:52

## 2021-05-26 RX ADMIN — ROPINIROLE HYDROCHLORIDE 0.5 MG: 1 TABLET, FILM COATED ORAL at 12:35

## 2021-05-26 RX ADMIN — SACUBITRIL AND VALSARTAN 1 TABLET: 24; 26 TABLET, FILM COATED ORAL at 07:52

## 2021-05-26 RX ADMIN — SPIRONOLACTONE 50 MG: 25 TABLET ORAL at 21:32

## 2021-05-26 RX ADMIN — IPRATROPIUM BROMIDE AND ALBUTEROL SULFATE 1 AMPULE: .5; 3 SOLUTION RESPIRATORY (INHALATION) at 08:25

## 2021-05-26 ASSESSMENT — PAIN DESCRIPTION - DESCRIPTORS
DESCRIPTORS: ACHING
DESCRIPTORS: ACHING;DULL

## 2021-05-26 ASSESSMENT — PAIN DESCRIPTION - FREQUENCY
FREQUENCY: CONTINUOUS

## 2021-05-26 ASSESSMENT — PAIN DESCRIPTION - PAIN TYPE
TYPE: CHRONIC PAIN
TYPE: CHRONIC PAIN

## 2021-05-26 ASSESSMENT — PAIN SCALES - GENERAL: PAINLEVEL_OUTOF10: 7

## 2021-05-26 ASSESSMENT — PAIN DESCRIPTION - ONSET
ONSET: ON-GOING

## 2021-05-26 ASSESSMENT — PAIN DESCRIPTION - PROGRESSION
CLINICAL_PROGRESSION: NOT CHANGED

## 2021-05-26 ASSESSMENT — PAIN DESCRIPTION - LOCATION
LOCATION: BACK

## 2021-05-26 ASSESSMENT — PAIN DESCRIPTION - ORIENTATION: ORIENTATION: LOWER

## 2021-05-26 NOTE — PROGRESS NOTES
Hospitalist Progress Note    Patient:  Delio Irvin      Unit/Bed:4B-12/012-A    YOB: 1960    MRN: [id number]       Acct: [de-identified]     PCP: Zac Bedoya DO    Date of Admission: 5/24/2021    Assessment/Plan:    1. Acute On Chronic Diastolic CHF  - continue diuresis  - trace pitting edema of shins  - I/O, Fluid restriciton  - cardio following  - Cr mildly elevated likely due to diuresis and contrast.   - recheck in AM and if improving, can dc     2. Atypical Chest pain / Non-Obs CAD  - continue home meds    3. HTN/HLD/DM II  - coreg, asa, neurontin, hydralazine,       Expected discharge date:  1day pending Cr improvement. Disposition:    [x] Home       [] TCU       [] Rehab       [] Psych       [] SNF       [] [place]       [] Other-    Chief Complaint: CP/SOB    Hospital Course:      Subjective (past 24 hours): doing well. Diuresing well.  Plan dc in 24hrs if cr improves       Medications:  Reviewed    Infusion Medications    dextrose      sodium chloride       Scheduled Medications    amitriptyline  20 mg Oral Nightly    carvedilol  25 mg Oral BID    gabapentin  300 mg Oral Nightly    hydrALAZINE  100 mg Oral 3 times per day    montelukast  10 mg Oral Daily    oxybutynin  10 mg Oral Daily    aspirin  81 mg Oral Daily    sacubitril-valsartan  1 tablet Oral BID    spironolactone  50 mg Oral BID    tamsulosin  0.4 mg Oral Nightly    sodium chloride flush  5-40 mL Intravenous 2 times per day    famotidine  20 mg Oral BID    insulin lispro  0-12 Units Subcutaneous TID WC    insulin lispro  0-6 Units Subcutaneous Nightly    [Held by provider] bumetanide  1 mg Intravenous Q12H    ipratropium-albuterol  1 ampule Inhalation Q4H WA    levothyroxine  200 mcg Oral Daily    rOPINIRole  0.5 mg Oral Lunch    rOPINIRole  1 mg Oral Nightly    enoxaparin  60 mg Subcutaneous Q12H     PRN Meds: oxyCODONE-acetaminophen, glucose, dextrose, glucagon (rDNA), dextrose, sodium chloride flush, sodium chloride, promethazine **OR** ondansetron, polyethylene glycol, acetaminophen **OR** acetaminophen, magnesium sulfate, potassium chloride **OR** potassium alternative oral replacement **OR** potassium chloride, perflutren lipid microspheres      Intake/Output Summary (Last 24 hours) at 5/26/2021 1053  Last data filed at 5/26/2021 0919  Gross per 24 hour   Intake 1500 ml   Output 2700 ml   Net -1200 ml       Diet:  DIET CARB CONTROL; Carb Control: 4 carb choices (60 gms)/meal; Low Sodium (2 GM); Daily Fluid Restriction: 2000 ml    Exam:  /79   Pulse 67   Temp 98.1 °F (36.7 °C) (Oral)   Resp 16   Ht 6' 1\" (1.854 m)   Wt (!) 350 lb 9.6 oz (159 kg)   SpO2 95%   BMI 46.26 kg/m²     General appearance: No apparent distress, appears stated age and cooperative. HEENT: Pupils equal, round, and reactive to light. Conjunctivae/corneas clear. Neck: Supple, with full range of motion. No jugular venous distention. Trachea midline. Respiratory:  Normal respiratory effort. Clear to auscultation, bilaterally without Rales/Wheezes/Rhonchi. Cardiovascular: Regular rate and rhythm, trace pitting edema of shins  Abdomen: Soft, non-tender, non-distended with normal bowel sounds. Musculoskeletal: passive and active ROM x 4 extremities. Skin: Skin color, texture, turgor normal.    Neurologic:  grossly non-focal.  Psychiatric: Alert and oriented, thought content appropriate  Peripheral Pulses: +2 palpable, equal bilaterally       Labs:   Recent Labs     05/24/21  1114 05/25/21  0340 05/26/21  0329   WBC 8.7 7.3 12.1*   HGB 12.4* 12.5* 12.1*   HCT 39.1* 38.8* 37.8*    243 297     Recent Labs     05/24/21  1114 05/25/21  0340 05/26/21  0329    137 137   K 4.6 4.5 4.2    101 101   CO2 25 25 26   BUN 12 17 28*   CREATININE 1.1 1.2 1.4*   CALCIUM 9.1 9.3 9.6     No results for input(s): AST, ALT, BILIDIR, BILITOT, ALKPHOS in the last 72 hours.   No results for input(s): INR in the last 72 hours. No results for input(s): Olimpia Hunter in the last 72 hours. Recent Labs     05/24/21  1114   PROCAL 0.13*       Microbiology:      Urinalysis:      Lab Results   Component Value Date    NITRU Negative 04/13/2021    WBCUA 0-2 03/02/2021    BACTERIA NONE 03/02/2021    RBCUA 0-2 03/02/2021    BLOODU Negative 04/13/2021    BLOODU NEGATIVE 03/02/2021    GLUCOSEU 500 04/13/2021       Radiology:  CTA CHEST W WO CONTRAST   Final Result   No PE. Mild cardiomegaly. **This report has been created using voice recognition software. It may contain minor errors which are inherent in voice recognition technology. **      Final report electronically signed by Dr. Veronica Pendleton on 5/24/2021 3:03 PM      XR CHEST PORTABLE   Final Result   1. Interval deterioration since previous study dated 12 May 2021 with increasing cardiomegaly and pulmonary vascular redistribution. These findings may represent changes of congestive heart failure versus fluid overload. .               **This report has been created using voice recognition software. It may contain minor errors which are inherent in voice recognition technology. **      Final report electronically signed by DR Isiah Kingston on 5/24/2021 11:42 AM          DVT prophylaxis: [] Lovenox                                 [x] SCDs                                 [] SQ Heparin                                 [] Encourage ambulation           [] Already on Anticoagulation     Code Status: Full Code    Tele:   [x] yes             [] no    Active Hospital Problems    Diagnosis Date Noted    CHF with unknown LVEF ([facility] [address] [I50.9] 05/24/2021       Electronically signed by Mana Reinoso MD on 5/26/2021 at 10:53 AM

## 2021-05-26 NOTE — PROGRESS NOTES
Cardiology Progress Note      Patient:  Bonnie Wolf  YOB: 1960  MRN: [id number]   Acct: [de-identified]  [facility] Date:  5/24/2021  Primary Cardiologist: Dr. Aaron Dorsey  Seen by Dr. Vania Xiao     Per prior cardiology consult note-  REASON FOR CONSULT:    chf      CHIEF COMPLAINT:    SOB     HISTORY OF PRESENT ILLNESS:    Bonnie Wolf is a pleasant 64year old male patient with past medical history that includes:   Past Medical History        Past Medical History:   Diagnosis Date    Arthritis      Back problem      CHF with unknown LVEF ([facility] [address] 5/24/2021    Constipation      Diabetes mellitus ([facility] [address]      Hypertension      Sleep apnea       has CPAP    Thyroid disease        The patient was admitted to the hospital after he was sent to ER from Dr Rebecca Novak office. He has been experiencing worsening SOB, AKINS, abdominal distention and nonproductive cough. He also reports having intermittent chest pains, retrosternal, atypical, sharp, worsen with deep breathing, mild, non-radiating, occurs mostly at rest, improved today with NTG. Patient denies paroxysmal nocturnal dyspnea, palpitations, dizziness, syncope. Labs revealed Cr 1.1, Troponin <0.01. ProBNP 486. EKG revealed NSR. Chest CTA revealed no PE, cardiomegaly was noticed. CXR was consistent with pulmonary edema, cardiomegaly. Echo 12/2020 revealed preserved EF. On 12/2020, LHC showed patent coronaries.         Subjective (Events in last 24 hours):     Pt diuresing   He states he is feeling improved - no further CP - SOB remains but improved     VSS  Tele SR no ectopy    He drinks lots water   Compliant with salt       5/26/2021  Pt in bed - he states breathing is a lot better   VSS  Tele SR no ectopy    Noted creat up from diuresis     farxiga is 100$/ month copay- hes not going to pay that      Objective:   /68   Pulse 65   Temp 97.8 °F (36.6 °C) (Oral)   Resp 16   Ht 6' 1\" (1.854 m)   Wt (!) 350 lb 9.6 oz (159 kg)   SpO2 97%   BMI 46.26 kg/m²        TELEMETRY: SR no ectopy    Physical Exam:  General Appearance: alert and oriented to person, place and time, in no acute distress  Cardiovascular: normal rate, regular rhythm, normal S1 and S2, no murmurs, rubs, clicks, or gallops, distal pulses intact,  Pulmonary/Chest: clear to auscultation bilaterally- no wheezes, rales or rhonchi, normal air movement, no respiratory distress  Abdomen: soft, non-tender, non-distended, normal bowel sounds, no masses Extremities: no cyanosis, clubbing - NO edema, pulses present    Skin: warm and dry, no rash or erythema  Musculoskeletal: normal range of motion, no joint swelling, deformity or tenderness  Neurological: alert, oriented, normal speech, no focal findings or movement disorder noted    Medications:    amitriptyline  20 mg Oral Nightly    carvedilol  25 mg Oral BID    gabapentin  300 mg Oral Nightly    hydrALAZINE  100 mg Oral 3 times per day    montelukast  10 mg Oral Daily    oxybutynin  10 mg Oral Daily    aspirin  81 mg Oral Daily    sacubitril-valsartan  1 tablet Oral BID    spironolactone  50 mg Oral BID    tamsulosin  0.4 mg Oral Nightly    sodium chloride flush  5-40 mL Intravenous 2 times per day    famotidine  20 mg Oral BID    insulin lispro  0-12 Units Subcutaneous TID WC    insulin lispro  0-6 Units Subcutaneous Nightly    [Held by provider] bumetanide  1 mg Intravenous Q12H    ipratropium-albuterol  1 ampule Inhalation Q4H WA    levothyroxine  200 mcg Oral Daily    rOPINIRole  0.5 mg Oral Lunch    rOPINIRole  1 mg Oral Nightly    enoxaparin  60 mg Subcutaneous Q12H      dextrose      sodium chloride       oxyCODONE-acetaminophen, 1 tablet, Q8H PRN  glucose, 15 g, PRN  dextrose, 12.5 g, PRN  glucagon (rDNA), 1 mg, PRN  dextrose, 100 mL/hr, PRN  sodium chloride flush, 5-40 mL, PRN  sodium chloride, 25 mL, PRN  promethazine, 12.5 mg, Q6H PRN   Or  ondansetron, 4 mg, Q6H PRN  polyethylene glycol, 17 g, Daily PRN  acetaminophen, 650 mg, Q6H PRN   Or  acetaminophen, 650 mg, Q6H PRN  magnesium sulfate, 2,000 mg, PRN  potassium chloride, 40 mEq, PRN   Or  potassium alternative oral replacement, 40 mEq, PRN   Or  potassium chloride, 10 mEq, PRN  perflutren lipid microspheres, 1.5 mL, ONCE PRN        Diagnostics:      Echo:    Electronically signed by Linton Moritz MD (Interpreting   physician) on 05/25/2021 at 04:48 PM   ----------------------------------------------------------------      Findings      Mitral Valve   Structurally normal mitral valve. Aortic Valve   Structurally normal aortic valve. Tricuspid Valve   Tricuspid valve is structurally normal.      Pulmonic Valve   The pulmonic valve was not well visualized . Left Atrium   Normal size left atrium. Left Ventricle   Left ventricle size is normal.   Moderate concentric left ventricular hypertrophy. There were no regional wall motion abnormalities. Ejection fraction is visually estimated in the range of 55% to 60%. Right Atrium   The right atrium is of normal size. Right Ventricle   Normal right ventricular size and function. Pericardial Effusion   No evidence of any pericardial effusion. Pleural Effusion   No evidence of pleural effusion. Aorta / Great Vessels   The aorta is within normal limits. IVC not recorded. Electronically signed by Varsha Patel MD (Interpreting   physician) on 12/15/2020 at 03:22 PM   ----------------------------------------------------------------      Findings      Mitral Valve   The mitral valve was not well visualized . DOPPLER: The transmitral velocity was within the normal range with no   evidence for mitral stenosis. There was no evidence of mitral   regurgitation. Aortic Valve   The aortic valve leaflets were not well visualized. DOPPLER: Transaortic velocity was within the normal range with no evidence   of aortic stenosis. There was no evidence of aortic regurgitation. Tricuspid Valve   Tricuspid valve was not well visualized. DOPPLER: There is no evidence of tricuspid stenosis. There was no evidence   of tricuspid regurgitation. Pulmonic Valve   The pulmonic valve was not well visualized . Elin Matthew DOPPLER: The transpulmonic velocity was within the normal range. No   evidence for regurgitation. Left Atrium   Left atrial size is normal.      Left Ventricle   Left ventricular size and systolic function is normal. Ejection fraction   was estimated at 55-60%. LV wall thickness is within normal limits and   there are no obvious wall motion abnormalities. Right Atrium   Right atrial size was normal.      Right Ventricle   The right ventricular size appears normal with normal systolic function   and wall thickness. Pericardial Effusion   The pericardium appears normal with no evidence of a pericardial effusion. Pleural Effusion   No evidence of pleural effusion. Aorta / Great Vessels   -Aortic root dimension within normal limits. -IVC size is within normal limits with normal respiratory phasic changes. Left Heart Cath:   RIGHT HEART CATHETERIZATION:  1.  RA is 25.  2.  RV is 80/23, 27.  3.  PA is 82/44, 59.  4.  Wedge pressure is 35.  5.  LV is 188/28, 36.  6.  AO was 205/126, 160.  7.  AO saturation is 91%. 8.  PA saturation is 63%. 9.  Cardiac output by Cherelle method is 5.9. 10.  Cardiac index is 2.1.     CORONARY ANATOMY:  1. Right coronary artery is a dominant vessel. The vessel is quite  large and has mild luminal irregularities in the proximal, mid, and  distal portions of the vessel; otherwise, it is patent. 2.  Left main is patent, gives rise to LAD and left circumflex arteries. 3.  Left circumflex artery is patent without any significant disease. 4. LAD is patent in the proximal portion, mid portion there is a 30% to  40% stenosis followed by mild luminal irregularities in the distal  portion of the LAD.   5.  LV gram was not performed due to renal dysfunction.     The patient tolerated the procedure well without any significant issues  or complications. Hemostasis was achieved with a Vasc Band device.     SUMMARY:  1. Elevated right heart pressures with elevated LVEPD. 2.  Patent coronaries.     RECOMMENDATIONS:  1.  IV diuresis. 2.  Monitor electrolytes. 3.  Optimize heart failure therapy. 4.  Weight loss advised. 5.  Optimize sleep apnea therapies. 6.  Follow up in clinic in one to two weeks to evaluate symptoms  further.           Mena Lin MD     D: 12/06/2020     Lab Data:    Cardiac Enzymes:  No results for input(s): CKTOTAL, CKMB, CKMBINDEX, TROPONINI in the last 72 hours.     CBC:   Lab Results   Component Value Date    WBC 12.1 05/26/2021    RBC 4.20 05/26/2021    RBC 5.15 08/12/2011    HGB 12.1 05/26/2021    HCT 37.8 05/26/2021     05/26/2021       CMP:    Lab Results   Component Value Date     05/26/2021    K 4.2 05/26/2021    K 2.9 12/01/2020     05/26/2021    CO2 26 05/26/2021    BUN 28 05/26/2021    CREATININE 1.4 05/26/2021    LABGLOM 52 05/26/2021    GLUCOSE 187 05/26/2021    CALCIUM 9.6 05/26/2021       Hepatic Function Panel:    Lab Results   Component Value Date    ALKPHOS 76 04/15/2021    ALT 47 04/15/2021    AST 41 04/15/2021    PROT 8.3 04/15/2021    BILITOT 0.5 04/15/2021    BILIDIR <0.2 03/02/2021    LABALBU 4.4 04/15/2021       Magnesium:    Lab Results   Component Value Date    MG 2.4 05/26/2021       PT/INR:    Lab Results   Component Value Date    INR 1.06 12/01/2020       HgBA1c:    Lab Results   Component Value Date    LABA1C 9.3 05/24/2021       FLP:    Lab Results   Component Value Date    TRIG 107 05/25/2021    HDL 35 05/25/2021    LDLCALC 96 05/25/2021       TSH:    Lab Results   Component Value Date    TSH 3.090 04/15/2021         Assessment:  Acute on chronic diastolic chf / RHF - improved     PHTN- severe     Chest pain - atypical - negative trop  Nonobstructive CAD by cath 12/2020  Preserved EF     HTN  HLP  DM II - uncontrolled A1C 9.3    Hx deisy- compliant with PAP       Plan:    Stop iv bumex - resume low dose at DC      Salt restriction 2 gm ( 2,000 mg) daily   Fluid restriction 2 L daily    Weigh yourself daily: Should you gain 2-3 pounds in 1 days time or 3-5 pounds in 2 days time-- call our CHF office ([phone]) for further recommendations      CHF guidelines:  BB: yes  ACE / ARB/ entresto: restart entresto after diuresed  Diuretics: yes  Aldactone: yes  Verdene Labor: copay $100/ month -- hes not gonna pay that   Current with CHF clinic   ? verquvo pt if recurrent issues       If pt creat improved in am - then ok to DC and follow CHF clinic as OP        Electronically signed by SAHIL Ramires CNP on 5/26/2021 at 11:44 AM

## 2021-05-26 NOTE — PLAN OF CARE
Problem: Falls - Risk of:  Goal: Will remain free from falls  Description: Will remain free from falls  5/25/2021 2034 by Amanda Bliss RN  Outcome: Ongoing  Note: Patient alert and oriented x4. Bed alarmed armed. Bed wheels locked. Bedside table in reach. Patient up with assistance when ambulating. Patient verbalizes and demonstrates the use of the call light. Hourly rounding being completed. Problem: Falls - Risk of:  Goal: Absence of physical injury  Description: Absence of physical injury  5/25/2021 2034 by Amanda Bliss RN  Outcome: Ongoing  Note: Patient free of accidental injury. Patient alert and oriented x4. Bed alarmed armed. Bed wheels locked. Bedside table in reach. Patient up with assistance when ambulating. Patient verbalizes and demonstrates the use of the call light. Hourly rounding being completed. Problem: Pain:  Goal: Pain level will decrease  Description: Pain level will decrease  5/25/2021 2034 by Amanda Bliss RN  Outcome: Ongoing  Note: Patient complains of pain in lower back. Patients current pain level is a 7/10. Patients pain goal is a 5/10. Patient is receiving Percocet for pain at this time. Problem: Pain:  Goal: Control of acute pain  Description: Control of acute pain  5/25/2021 2034 by Amanda Bliss RN  Outcome: Ongoing  Note: Patient complains of pain in lower back. Patients current pain level is a 7/10. Patients pain goal is a 5/10. Patient is receiving Percocet for pain at this time. Problem: Pain:  Goal: Control of chronic pain  Description: Control of chronic pain  5/25/2021 2034 by Amanda Bliss RN  Outcome: Ongoing  Note: Patient complains of pain in lower back. Patients current pain level is a 7/10. Patients pain goal is a 5/10. Patient is receiving Percocet for pain at this time.       Problem: Cardiovascular  Goal: No DVT, peripheral vascular complications  4/02/0521 2034 by Amanda Bliss RN  Outcome: Ongoing  Note: Patient has no signs/ symptoms of DVT at this time. Patient receiving Lovenox at this time. Will continue to monitor. Problem: Cardiovascular  Goal: Hemodynamic stability  5/25/2021 2034 by Brooklynn Casanova RN  Outcome: Ongoing  Note: Blood pressure within normal limits this shift. Patient displays no bleeding abnormalities. Monitoring labs frequently for any abnormalities. Capillary refill less than 3 seconds. Skin turgor less than 3 seconds. Pulses palpable. Problem: Cardiovascular  Goal: Anticoagulate/Hct stable  5/25/2021 2034 by Brooklynn Casanova RN  Outcome: Ongoing  Note: Hemoglobin is stable. Problem: Cardiovascular  Goal: Weight maintained or lost  5/25/2021 2034 by Brooklynn Casanova RN  Outcome: Ongoing  Note: Patient educated on ways to lose weight per physician. Problem: Nutrition  Goal: Optimal nutrition therapy  5/25/2021 2034 by Brooklynn Casanova RN  Outcome: Ongoing  Note: Patient eating three meals a day. Appetite intact. Patient tolerating PO fluids. Problem: Skin Integrity/Risk  Goal: No skin breakdown during hospitalization  5/25/2021 2034 by Brooklynn Caasnova RN  Outcome: Ongoing  Note: No skin breakdown noted during hospital stay, will continue to assess and monitor. Patient educated on frequent position changes to prevent breakdown. Care plan reviewed with patient. Patient verbalizes understanding of the plan of care and contribute to goal setting.

## 2021-05-27 VITALS
HEART RATE: 73 BPM | DIASTOLIC BLOOD PRESSURE: 80 MMHG | BODY MASS INDEX: 41.75 KG/M2 | WEIGHT: 315 LBS | HEIGHT: 73 IN | SYSTOLIC BLOOD PRESSURE: 137 MMHG | TEMPERATURE: 98.1 F | RESPIRATION RATE: 20 BRPM | OXYGEN SATURATION: 98 %

## 2021-05-27 LAB
ANION GAP SERPL CALCULATED.3IONS-SCNC: 11 MEQ/L (ref 8–16)
BASOPHILS # BLD: 0.7 %
BASOPHILS ABSOLUTE: 0 THOU/MM3 (ref 0–0.1)
BUN BLDV-MCNC: 26 MG/DL (ref 7–22)
CALCIUM SERPL-MCNC: 9.4 MG/DL (ref 8.5–10.5)
CHLORIDE BLD-SCNC: 101 MEQ/L (ref 98–111)
CO2: 26 MEQ/L (ref 23–33)
CREAT SERPL-MCNC: 1.2 MG/DL (ref 0.4–1.2)
EOSINOPHIL # BLD: 3 %
EOSINOPHILS ABSOLUTE: 0.2 THOU/MM3 (ref 0–0.4)
ERYTHROCYTE [DISTWIDTH] IN BLOOD BY AUTOMATED COUNT: 14.6 % (ref 11.5–14.5)
ERYTHROCYTE [DISTWIDTH] IN BLOOD BY AUTOMATED COUNT: 47.8 FL (ref 35–45)
GFR SERPL CREATININE-BSD FRML MDRD: 62 ML/MIN/1.73M2
GLUCOSE BLD-MCNC: 147 MG/DL (ref 70–108)
GLUCOSE BLD-MCNC: 152 MG/DL (ref 70–108)
GLUCOSE BLD-MCNC: 160 MG/DL (ref 70–108)
HCT VFR BLD CALC: 40.4 % (ref 42–52)
HEMOGLOBIN: 12.8 GM/DL (ref 14–18)
IMMATURE GRANS (ABS): 0.04 THOU/MM3 (ref 0–0.07)
IMMATURE GRANULOCYTES: 0.6 %
LYMPHOCYTES # BLD: 17.4 %
LYMPHOCYTES ABSOLUTE: 1.2 THOU/MM3 (ref 1–4.8)
MAGNESIUM: 2.3 MG/DL (ref 1.6–2.4)
MCH RBC QN AUTO: 28.6 PG (ref 26–33)
MCHC RBC AUTO-ENTMCNC: 31.7 GM/DL (ref 32.2–35.5)
MCV RBC AUTO: 90.4 FL (ref 80–94)
MONOCYTES # BLD: 12.1 %
MONOCYTES ABSOLUTE: 0.8 THOU/MM3 (ref 0.4–1.3)
NUCLEATED RED BLOOD CELLS: 0 /100 WBC
PLATELET # BLD: 291 THOU/MM3 (ref 130–400)
PMV BLD AUTO: 8.9 FL (ref 9.4–12.4)
POTASSIUM REFLEX MAGNESIUM: 4 MEQ/L (ref 3.5–5.2)
POTASSIUM SERPL-SCNC: 4 MEQ/L (ref 3.5–5.2)
PRO-BNP: 55.4 PG/ML (ref 0–900)
RBC # BLD: 4.47 MILL/MM3 (ref 4.7–6.1)
SEG NEUTROPHILS: 66.2 %
SEGMENTED NEUTROPHILS ABSOLUTE COUNT: 4.4 THOU/MM3 (ref 1.8–7.7)
SODIUM BLD-SCNC: 138 MEQ/L (ref 135–145)
WBC # BLD: 6.7 THOU/MM3 (ref 4.8–10.8)

## 2021-05-27 PROCEDURE — 6370000000 HC RX 637 (ALT 250 FOR IP): Performed by: INTERNAL MEDICINE

## 2021-05-27 PROCEDURE — 94640 AIRWAY INHALATION TREATMENT: CPT

## 2021-05-27 PROCEDURE — 82948 REAGENT STRIP/BLOOD GLUCOSE: CPT

## 2021-05-27 PROCEDURE — 99239 HOSP IP/OBS DSCHRG MGMT >30: CPT | Performed by: FAMILY MEDICINE

## 2021-05-27 PROCEDURE — 6360000002 HC RX W HCPCS: Performed by: INTERNAL MEDICINE

## 2021-05-27 PROCEDURE — 2580000003 HC RX 258: Performed by: INTERNAL MEDICINE

## 2021-05-27 PROCEDURE — 36415 COLL VENOUS BLD VENIPUNCTURE: CPT

## 2021-05-27 PROCEDURE — 83735 ASSAY OF MAGNESIUM: CPT

## 2021-05-27 PROCEDURE — 85025 COMPLETE CBC W/AUTO DIFF WBC: CPT

## 2021-05-27 PROCEDURE — 80048 BASIC METABOLIC PNL TOTAL CA: CPT

## 2021-05-27 PROCEDURE — 83880 ASSAY OF NATRIURETIC PEPTIDE: CPT

## 2021-05-27 PROCEDURE — 94760 N-INVAS EAR/PLS OXIMETRY 1: CPT

## 2021-05-27 RX ORDER — BUMETANIDE 0.5 MG/1
0.5 TABLET ORAL DAILY
Qty: 30 TABLET | Refills: 0 | Status: SHIPPED | OUTPATIENT
Start: 2021-05-27 | End: 2021-06-04 | Stop reason: SDUPTHER

## 2021-05-27 RX ADMIN — FAMOTIDINE 20 MG: 20 TABLET, FILM COATED ORAL at 09:05

## 2021-05-27 RX ADMIN — LEVOTHYROXINE SODIUM 200 MCG: 0.1 TABLET ORAL at 06:37

## 2021-05-27 RX ADMIN — ASPIRIN 81 MG: 81 TABLET, COATED ORAL at 09:05

## 2021-05-27 RX ADMIN — HYDRALAZINE HYDROCHLORIDE 100 MG: 50 TABLET, FILM COATED ORAL at 06:37

## 2021-05-27 RX ADMIN — SODIUM CHLORIDE, PRESERVATIVE FREE 10 ML: 5 INJECTION INTRAVENOUS at 09:14

## 2021-05-27 RX ADMIN — CARVEDILOL 25 MG: 25 TABLET, FILM COATED ORAL at 09:05

## 2021-05-27 RX ADMIN — SPIRONOLACTONE 50 MG: 25 TABLET ORAL at 09:04

## 2021-05-27 RX ADMIN — SACUBITRIL AND VALSARTAN 1 TABLET: 24; 26 TABLET, FILM COATED ORAL at 09:04

## 2021-05-27 RX ADMIN — OXYCODONE HYDROCHLORIDE AND ACETAMINOPHEN 1 TABLET: 10; 325 TABLET ORAL at 06:39

## 2021-05-27 RX ADMIN — MONTELUKAST SODIUM 10 MG: 10 TABLET ORAL at 09:05

## 2021-05-27 RX ADMIN — IPRATROPIUM BROMIDE AND ALBUTEROL SULFATE 1 AMPULE: .5; 3 SOLUTION RESPIRATORY (INHALATION) at 08:35

## 2021-05-27 RX ADMIN — OXYBUTYNIN CHLORIDE 10 MG: 10 TABLET, EXTENDED RELEASE ORAL at 09:04

## 2021-05-27 RX ADMIN — INSULIN LISPRO 2 UNITS: 100 INJECTION, SOLUTION INTRAVENOUS; SUBCUTANEOUS at 09:05

## 2021-05-27 RX ADMIN — ENOXAPARIN SODIUM 60 MG: 60 INJECTION SUBCUTANEOUS at 09:05

## 2021-05-27 ASSESSMENT — PAIN DESCRIPTION - PAIN TYPE: TYPE: CHRONIC PAIN

## 2021-05-27 ASSESSMENT — PAIN DESCRIPTION - ONSET: ONSET: ON-GOING

## 2021-05-27 ASSESSMENT — PAIN DESCRIPTION - PROGRESSION: CLINICAL_PROGRESSION: NOT CHANGED

## 2021-05-27 ASSESSMENT — PAIN DESCRIPTION - DESCRIPTORS: DESCRIPTORS: ACHING

## 2021-05-27 ASSESSMENT — PAIN DESCRIPTION - LOCATION: LOCATION: BACK

## 2021-05-27 ASSESSMENT — PAIN SCALES - GENERAL: PAINLEVEL_OUTOF10: 8

## 2021-05-27 NOTE — CARE COORDINATION
5/27/21, 9:53 AM EDT    Patient goals/plan/ treatment preferences discussed by  and . Patient goals/plan/ treatment preferences reviewed with patient/ family. Patient/ family verbalize understanding of discharge plan and are in agreement with goal/plan/treatment preferences. Understanding was demonstrated using the teach back method. AVS provided by RN at time of discharge, which includes all necessary medical information pertaining to the patients current course of illness, treatment, post-discharge goals of care, and treatment preferences. IMM Letter  IMM Letter given to Patient/Family/Significant other/Guardian/POA/by[de-identified]   IMM Letter date given[de-identified] 05/27/21  IMM Letter time given[de-identified] 0930       Pt verbalized understanding and gave permission for possible discharge within 4 hours of signing IMM. Planning home today. Denies any discharge needs.      Electronically signed by Emelia Roy RN on 5/27/2021 at 9:53 AM

## 2021-05-27 NOTE — DISCHARGE SUMMARY
Component Value Date    WBC 6.7 05/27/2021    HGB 12.8 05/27/2021    HCT 40.4 05/27/2021     05/27/2021       Renal:    Lab Results   Component Value Date     05/27/2021    K 4.0 05/27/2021    K 4.0 05/27/2021     05/27/2021    CO2 26 05/27/2021    BUN 26 05/27/2021    CREATININE 1.2 05/27/2021    CALCIUM 9.4 05/27/2021         Significant Diagnostic Studies    Radiology:   CTA CHEST W WO CONTRAST   Final Result   No PE. Mild cardiomegaly. **This report has been created using voice recognition software. It may contain minor errors which are inherent in voice recognition technology. **      Final report electronically signed by Dr. Reynaldo Hanson on 5/24/2021 3:03 PM      XR CHEST PORTABLE   Final Result   1. Interval deterioration since previous study dated 12 May 2021 with increasing cardiomegaly and pulmonary vascular redistribution. These findings may represent changes of congestive heart failure versus fluid overload. .               **This report has been created using voice recognition software. It may contain minor errors which are inherent in voice recognition technology. **      Final report electronically signed by DR Martina Ashley on 5/24/2021 11:42 AM             Consults:     IP CONSULT TO HEART FAILURE NURSE/COORDINATOR  IP CONSULT TO DIETITIAN  IP CONSULT TO CARDIOLOGY  IP CONSULT TO SPIRITUAL SERVICES  IP CONSULT TO PHARMACY    Disposition: Home  Condition at Discharge: Stable    Code Status:  Full Code     Patient Instructions:    Discharge lab work: Activity: activity as tolerated  Diet: DIET CARB CONTROL; Carb Control: 4 carb choices (60 gms)/meal; Low Sodium (2 GM); Daily Fluid Restriction: 2000 ml      Follow-up visits:   No follow-up provider specified.        Discharge Medications:      Addis Shepherd   Home Medication Instructions [id number]    Printed on:05/27/21 2101   Medication Information                      acetaminophen (TYLENOL) 500 MG tablet  Take 1 tablet by mouth every 6 hours as needed for Pain             albuterol (PROVENTIL HFA;VENTOLIN HFA) 108 (90 BASE) MCG/ACT inhaler  Inhale 1 puff into the lungs every 6 hours as needed for Wheezing. amitriptyline (ELAVIL) 10 MG tablet  Take 2 tablets by mouth nightly             azelastine (ASTELIN) 137 MCG/SPRAY nasal spray  1 spray by Nasal route as needed. Use in each nostril as directed             bismuth subsalicylate (PEPTO BISMOL) 262 MG/15ML suspension  Take by mouth daily             bumetanide (BUMEX) 2 MG tablet  Take 1 tablet by mouth daily AND as directed by [facility]             carvedilol (COREG) 25 MG tablet  Take 1 tablet by mouth 2 times daily             cyclobenzaprine (FLEXERIL) 10 MG tablet  Take 10 mg by mouth 3 times daily as needed for Muscle spasms             Fluticasone furoate-vilanterol (BREO ELLIPTA) 200-25 MCG/INH AEPB inhaler  Inhale 1 puff into the lungs daily             gabapentin (NEURONTIN) 300 MG capsule  Take 1 capsule by mouth nightly for 90 days. glimepiride (AMARYL) 4 MG tablet  Take 4 mg by mouth daily             hydrALAZINE (APRESOLINE) 100 MG tablet  Take 1 tablet by mouth every 8 hours             levothyroxine (SYNTHROID) 200 MCG tablet  Take 1 tablet by mouth Daily             montelukast (SINGULAIR) 10 MG tablet  Take 1 tablet by mouth daily             oxybutynin (DITROPAN XL) 10 MG extended release tablet  Take 1 tablet by mouth daily             oxyCODONE-acetaminophen (PERCOCET)  MG per tablet  Take 1 tablet by mouth every 8 hours as needed for Pain for up to 30 days. potassium chloride (MICRO-K) 10 MEQ extended release capsule  Take 2 capsules by mouth daily             RA ASPIRIN EC 81 MG EC tablet  take 1 tablet by mouth once daily             rOPINIRole (REQUIP) 0.5 MG tablet  Take 1 pill in afternoon and 2 pills at night.              sacubitril-valsartan (ENTRESTO) 24-26 MG per tablet  Take 1 tablet by mouth 2

## 2021-05-27 NOTE — PROGRESS NOTES
AVS reviewed in detail with the patient. All follow-up appointments reviewed as well as all medications. All questions and concerns addressed     Prescription delivered per outpatient pharmacy. Patient in stable condition taken to waiting vehicle via WC. Encouragement and support offered to patient.

## 2021-06-05 RX ORDER — BUMETANIDE 0.5 MG/1
0.5 TABLET ORAL DAILY
Qty: 90 TABLET | Refills: 3 | Status: SHIPPED | OUTPATIENT
Start: 2021-06-05 | End: 2021-06-14 | Stop reason: SDUPTHER

## 2021-06-07 ENCOUNTER — OFFICE VISIT (OUTPATIENT)
Dept: CARDIOLOGY CLINIC | Age: 61
End: 2021-06-07
Payer: MEDICARE

## 2021-06-07 VITALS
BODY MASS INDEX: 41.75 KG/M2 | OXYGEN SATURATION: 93 % | WEIGHT: 315 LBS | HEART RATE: 77 BPM | SYSTOLIC BLOOD PRESSURE: 138 MMHG | HEIGHT: 73 IN | DIASTOLIC BLOOD PRESSURE: 80 MMHG

## 2021-06-07 DIAGNOSIS — E66.01 OBESITY, CLASS III, BMI 40-49.9 (MORBID OBESITY) (HCC): ICD-10-CM

## 2021-06-07 DIAGNOSIS — G47.33 OSA (OBSTRUCTIVE SLEEP APNEA): ICD-10-CM

## 2021-06-07 DIAGNOSIS — I50.32 CHF (CONGESTIVE HEART FAILURE), NYHA CLASS II, CHRONIC, DIASTOLIC (HCC): Primary | ICD-10-CM

## 2021-06-07 DIAGNOSIS — I10 ESSENTIAL HYPERTENSION: ICD-10-CM

## 2021-06-07 PROCEDURE — G8427 DOCREV CUR MEDS BY ELIG CLIN: HCPCS | Performed by: NURSE PRACTITIONER

## 2021-06-07 PROCEDURE — G8417 CALC BMI ABV UP PARAM F/U: HCPCS | Performed by: NURSE PRACTITIONER

## 2021-06-07 PROCEDURE — 3017F COLORECTAL CA SCREEN DOC REV: CPT | Performed by: NURSE PRACTITIONER

## 2021-06-07 PROCEDURE — 99214 OFFICE O/P EST MOD 30 MIN: CPT | Performed by: NURSE PRACTITIONER

## 2021-06-07 PROCEDURE — 1036F TOBACCO NON-USER: CPT | Performed by: NURSE PRACTITIONER

## 2021-06-07 PROCEDURE — 1111F DSCHRG MED/CURRENT MED MERGE: CPT | Performed by: NURSE PRACTITIONER

## 2021-06-07 RX ORDER — NITROGLYCERIN 0.4 MG/1
0.4 TABLET SUBLINGUAL EVERY 5 MIN PRN
Qty: 25 TABLET | Refills: 3 | Status: SHIPPED | OUTPATIENT
Start: 2021-06-07

## 2021-06-07 RX ORDER — GABAPENTIN 300 MG/1
300 CAPSULE ORAL NIGHTLY
Qty: 90 CAPSULE | Refills: 0 | Status: SHIPPED | OUTPATIENT
Start: 2021-06-07 | End: 2021-07-16 | Stop reason: SDUPTHER

## 2021-06-07 ASSESSMENT — ENCOUNTER SYMPTOMS
COUGH: 0
ABDOMINAL DISTENTION: 0
CONSTIPATION: 1
SHORTNESS OF BREATH: 1

## 2021-06-07 NOTE — TELEPHONE ENCOUNTER
OARRS reviewed. UDS: + for  Oxycodone -consistent. + for  Cyclobenzaprine, Gabapentin -inconsistent. Last seen: 5/24/2021.  Follow-up: 8/2/2021

## 2021-06-07 NOTE — PATIENT INSTRUCTIONS
You may receive a survey regarding the care you received during your visit. Your input is valuable to us. We encourage you to complete and return your survey. We hope you will choose us in the future for your healthcare needs.     Continue:  · Continue current medications  · Daily weights and record  · Fluid restriction of 2 Liters per day  · Limit sodium in diet to around 5801-0501 mg/day  · Monitor BP  · Activity as tolerated     Call the [facility] Failure Clinic for any of the following symptoms: [phone]   Weight gain of 2-3 pounds in 1 day or 5 pounds in 1 week   Increased shortness of breath   Shortness of breath while laying down   Cough   Chest pain   Swelling in feet, ankles or legs   Tenderness or bloating in the abdomen   Fatigue    Decreased appetite or nausea    Confusion   

## 2021-06-07 NOTE — PROGRESS NOTES
CARPAL TUNNEL RELEASE Bilateral     COLONOSCOPY  2017    Dr. Kenyatta Grijalva, ESOPHAGUS      HERNIA REPAIR      x3 surgeries with 14 repairs    KNEE SURGERY Right 1980's    cartilage    CO EXPLORATORY OF ABDOMEN N/A 2/5/2018    ABDOMINAL EXPLORATION WITH LYSIS OF COMPLICATED ADHESIONS WITH AN EXTENDED RIGHT COLON RESECTION performed by Beryl Kraft MD at [facility] History   Problem Relation Age of Onset    Cancer Mother         breast    Heart Disease Father         bladder, lung    Cancer Father     Stroke Brother     Heart Attack Brother     Prostate Cancer Brother     Diabetes Paternal Grandmother     Arthritis Brother     High Blood Pressure Neg Hx      Social History     Tobacco Use    Smoking status: Never Smoker    Smokeless tobacco: Former User     Types: Chew    Tobacco comment: quit pipe over 30 yrs ago   Substance Use Topics    Alcohol use: No     Alcohol/week: 0.0 standard drinks     Comment: quit     Current Outpatient Medications   Medication Sig Dispense Refill    nitroGLYCERIN (NITROSTAT) 0.4 MG SL tablet Place 1 tablet under the tongue every 5 minutes as needed for Chest pain (up to 3 doses) 25 tablet 3    bumetanide (BUMEX) 0.5 MG tablet Take 1 tablet by mouth daily AND as directed by CHF clinic 90 tablet 3    Fluticasone furoate-vilanterol (BREO ELLIPTA) 200-25 MCG/INH AEPB inhaler Inhale 1 puff into the lungs daily 3 each 3    oxyCODONE-acetaminophen (PERCOCET)  MG per tablet Take 1 tablet by mouth every 8 hours as needed for Pain for up to 30 days. 90 tablet 0    rOPINIRole (REQUIP) 0.5 MG tablet Take 1 pill in afternoon and 2 pills at night.  90 tablet 2    amitriptyline (ELAVIL) 10 MG tablet Take 2 tablets by mouth nightly 60 tablet 5    RA ASPIRIN EC 81 MG EC tablet take 1 tablet by mouth once daily 90 tablet 0    oxybutynin (DITROPAN XL) 10 MG extended release tablet Take 1 tablet by mouth daily 90 tablet 3    tamsulosin (FLOMAX) 0.4 MG capsule Take 1 capsule by mouth nightly 90 capsule 3    hydrALAZINE (APRESOLINE) 100 MG tablet Take 1 tablet by mouth every 8 hours 270 tablet 3    sacubitril-valsartan (ENTRESTO) 24-26 MG per tablet Take 1 tablet by mouth 2 times daily 60 tablet 5    montelukast (SINGULAIR) 10 MG tablet Take 1 tablet by mouth daily 30 tablet 11    spironolactone (ALDACTONE) 50 MG tablet Take 1 tablet by mouth 2 times daily 60 tablet 5    potassium chloride (MICRO-K) 10 MEQ extended release capsule Take 2 capsules by mouth daily 60 capsule 0    levothyroxine (SYNTHROID) 200 MCG tablet Take 1 tablet by mouth Daily 30 tablet 1    bismuth subsalicylate (PEPTO BISMOL) 262 MG/15ML suspension Take by mouth daily      glimepiride (AMARYL) 4 MG tablet Take 4 mg by mouth daily      carvedilol (COREG) 25 MG tablet Take 1 tablet by mouth 2 times daily 180 tablet 3    acetaminophen (TYLENOL) 500 MG tablet Take 1 tablet by mouth every 6 hours as needed for Pain 40 tablet 0    cyclobenzaprine (FLEXERIL) 10 MG tablet Take 10 mg by mouth 3 times daily as needed for Muscle spasms      albuterol (PROVENTIL HFA;VENTOLIN HFA) 108 (90 BASE) MCG/ACT inhaler Inhale 1 puff into the lungs every 6 hours as needed for Wheezing.  azelastine (ASTELIN) 137 MCG/SPRAY nasal spray 1 spray by Nasal route as needed. Use in each nostril as directed      gabapentin (NEURONTIN) 300 MG capsule Take 1 capsule by mouth nightly for 90 days. 90 capsule 0     No current facility-administered medications for this visit. Allergies   Allergen Reactions    Shellfish-Derived Products Swelling    Pcn [Penicillins] Itching    Succinylcholine      Pseudocholinesterase Deficiency    Morphine Nausea And Vomiting     \"head swimming, skin crawling\"    Tape [Adhesive Tape] Rash       SUBJECTIVE:   Review of Systems   Constitutional: Positive for fatigue. Negative for activity change and appetite change. Respiratory: Positive for shortness of breath (improved). 137/80   05/24/21 (!) 148/90     Pulse Readings from Last 3 Encounters:   06/07/21 77   05/27/21 73   05/24/21 77     Body mass index is 46.23 kg/m². ECHO:    Summary   Left ventricle size is normal.   Moderate concentric left ventricular hypertrophy. There were no regional wall motion abnormalities. Ejection fraction is visually estimated in the range of 55% to 60%. Signature      ----------------------------------------------------------------   Electronically signed by Irvin Ratliff MD (Interpreting   physician) on 05/25/2021 at 04:48 PM   ----------------------------------------------------------------    CATH/STRESS:   RIGHT HEART CATHETERIZATION:  1.  RA is 25.  2.  RV is 80/23, 27.  3.  PA is 82/44, 59.  4.  Wedge pressure is 35.  5.  LV is 188/28, 36.  6.  AO was 205/126, 160.  7.  AO saturation is 91%. 8.  PA saturation is 63%. 9.  Cardiac output by Cherelle method is 5.9. 10.  Cardiac index is 2.1.     CORONARY ANATOMY:  1. Right coronary artery is a dominant vessel. The vessel is quite  large and has mild luminal irregularities in the proximal, mid, and  distal portions of the vessel; otherwise, it is patent. 2.  Left main is patent, gives rise to LAD and left circumflex arteries. 3.  Left circumflex artery is patent without any significant disease. 4. LAD is patent in the proximal portion, mid portion there is a 30% to  40% stenosis followed by mild luminal irregularities in the distal  portion of the LAD. 5.  LV gram was not performed due to renal dysfunction.     The patient tolerated the procedure well without any significant issues  or complications. Hemostasis was achieved with a Vasc Band device.     SUMMARY:  1. Elevated right heart pressures with elevated LVEPD. 2.  Patent coronaries.     RECOMMENDATIONS:  1.  IV diuresis. 2.  Monitor electrolytes. 3.  Optimize heart failure therapy. 4.  Weight loss advised. 5.  Optimize sleep apnea therapies.   6.  Follow up in clinic in one to two weeks to evaluate symptoms  further.        Lucina Ha MD     D: 12/06/2020 21:57:38        Results reviewed:  BNP: No results found for: BNP  CBC:   Lab Results   Component Value Date    WBC 6.7 05/27/2021    RBC 4.47 05/27/2021    RBC 5.15 08/12/2011    HGB 12.8 05/27/2021    HCT 40.4 05/27/2021     05/27/2021     CMP:    Lab Results   Component Value Date     05/27/2021    K 4.0 05/27/2021    K 4.0 05/27/2021     05/27/2021    CO2 26 05/27/2021    BUN 26 05/27/2021    CREATININE 1.2 05/27/2021    LABGLOM 62 05/27/2021    GLUCOSE 160 05/27/2021    CALCIUM 9.4 05/27/2021     Hepatic Function Panel:    Lab Results   Component Value Date    ALKPHOS 76 04/15/2021    ALT 47 04/15/2021    AST 41 04/15/2021    PROT 8.3 04/15/2021    BILITOT 0.5 04/15/2021    BILIDIR <0.2 03/02/2021    LABALBU 4.4 04/15/2021     Magnesium:    Lab Results   Component Value Date    MG 2.3 05/27/2021     PT/INR:    Lab Results   Component Value Date    INR 1.06 12/01/2020     Lipids:    Lab Results   Component Value Date    TRIG 107 05/25/2021    HDL 35 05/25/2021    LDLCALC 96 05/25/2021       ASSESSMENT AND PLAN:   The patient's condition/symptoms are Stable: No clinical evidence of fluid overload today. Continue current medical regimen without changes at present time. Diagnosis Orders   1. CHF (congestive heart failure), NYHA class II, chronic, diastolic ([address]     2. Essential hypertension     3. LAURIE (obstructive sleep apnea)     4. Obesity, Class III, BMI 40-49.9 (morbid obesity) (HCC)       Continue:  GDMT:   ACE/ARB/ARNI - Entresto 24/26 BID   BB - Coreg 25 BID   Diuretic - Bumex 0.5/day - was on 2mg/day prior  AA - Aldactone 50 BID  Vasodilator - Hydralazine 100 TID  Continue Current medications  Stable, appears Euvolemic on exam  Recent CHF admission, 2nd admission this year = d/c'd on lower dose Bumex   Monitor wts very closely. Call if increase. Constipation - recommend Miralax.   [facility]

## 2021-06-08 ENCOUNTER — PREP FOR PROCEDURE (OUTPATIENT)
Dept: CARDIOLOGY CLINIC | Age: 61
End: 2021-06-08

## 2021-06-08 RX ORDER — SODIUM CHLORIDE 9 MG/ML
INJECTION, SOLUTION INTRAVENOUS CONTINUOUS
Status: CANCELLED | OUTPATIENT
Start: 2021-06-08

## 2021-06-08 RX ORDER — SODIUM CHLORIDE 9 MG/ML
25 INJECTION, SOLUTION INTRAVENOUS PRN
Status: CANCELLED | OUTPATIENT
Start: 2021-06-08

## 2021-06-08 RX ORDER — SODIUM CHLORIDE 0.9 % (FLUSH) 0.9 %
5-40 SYRINGE (ML) INJECTION EVERY 12 HOURS SCHEDULED
Status: CANCELLED | OUTPATIENT
Start: 2021-06-08

## 2021-06-08 RX ORDER — SODIUM CHLORIDE 0.9 % (FLUSH) 0.9 %
5-40 SYRINGE (ML) INJECTION PRN
Status: CANCELLED | OUTPATIENT
Start: 2021-06-08

## 2021-06-08 RX ORDER — ASPIRIN 325 MG
325 TABLET ORAL ONCE
Status: CANCELLED | OUTPATIENT
Start: 2021-06-08 | End: 2021-06-08

## 2021-06-10 DIAGNOSIS — G89.4 CHRONIC PAIN SYNDROME: ICD-10-CM

## 2021-06-10 RX ORDER — OXYCODONE AND ACETAMINOPHEN 10; 325 MG/1; MG/1
1 TABLET ORAL EVERY 8 HOURS PRN
Qty: 90 TABLET | Refills: 0 | Status: SHIPPED | OUTPATIENT
Start: 2021-06-10 | End: 2021-07-10

## 2021-06-10 NOTE — TELEPHONE ENCOUNTER
OARRS reviewed. UDS: + for  Oxycodone flexeril gabapentin consistent. Last seen: 5/24/2021.  Follow-up:   Future Appointments   Date Time Provider Ismael Haas   6/16/2021  8:00 AM STR CVI ROOM 2E12 STRZ [address]E [place]   6/16/2021 10:00 AM STR CARDIAC CATHETERIZATION RM2 STRZ CATH [place]   6/25/2021 12:00 PM SAHIL Verma CNP N SRPX CHF [facility] [place]   7/8/2021  1:30 PM Mera Og MD N SRPX Heart [facility]   7/23/2021 11:40 AM Jenae Bowen MD N [facility]   8/2/2021  8:30 AM SAHIL Cruz CNP N SRPX Pain P - [place]   8/2/2021  9:15 [facility]   8/12/2021  3:00 PM SAHIL Arreola CNP N Pulm Med [facility]   8/16/2021 10:00 AM MD RANGEL Hernandez SRPX Heart [facility] [place]   9/9/2021 11:30 AM SAHIL Verma CNP N SRPX CHF P - [place]   4/12/2022 12:45 PM Amy Thakkar  [facility]

## 2021-06-14 RX ORDER — SPIRONOLACTONE 50 MG/1
50 TABLET, FILM COATED ORAL 2 TIMES DAILY
Qty: 180 TABLET | Refills: 3 | Status: SHIPPED | OUTPATIENT
Start: 2021-06-14 | End: 2022-02-25

## 2021-06-14 RX ORDER — BUMETANIDE 0.5 MG/1
0.5 TABLET ORAL DAILY
Qty: 90 TABLET | Refills: 3 | Status: SHIPPED | OUTPATIENT
Start: 2021-06-14 | End: 2021-06-25

## 2021-06-14 RX ORDER — HYDRALAZINE HYDROCHLORIDE 100 MG/1
100 TABLET, FILM COATED ORAL EVERY 8 HOURS SCHEDULED
Qty: 270 TABLET | Refills: 3 | Status: ON HOLD | OUTPATIENT
Start: 2021-06-14 | End: 2022-03-30 | Stop reason: HOSPADM

## 2021-06-15 ENCOUNTER — PREP FOR PROCEDURE (OUTPATIENT)
Dept: CARDIOLOGY | Age: 61
End: 2021-06-15

## 2021-06-17 ENCOUNTER — TELEPHONE (OUTPATIENT)
Dept: CARDIOLOGY CLINIC | Age: 61
End: 2021-06-17

## 2021-06-25 ENCOUNTER — OFFICE VISIT (OUTPATIENT)
Dept: CARDIOLOGY CLINIC | Age: 61
End: 2021-06-25
Payer: MEDICARE

## 2021-06-25 VITALS
OXYGEN SATURATION: 95 % | SYSTOLIC BLOOD PRESSURE: 110 MMHG | DIASTOLIC BLOOD PRESSURE: 68 MMHG | HEART RATE: 83 BPM | HEIGHT: 73 IN | BODY MASS INDEX: 41.75 KG/M2 | WEIGHT: 315 LBS

## 2021-06-25 DIAGNOSIS — I27.20 PULMONARY HTN (HCC): ICD-10-CM

## 2021-06-25 DIAGNOSIS — G47.33 OSA (OBSTRUCTIVE SLEEP APNEA): ICD-10-CM

## 2021-06-25 DIAGNOSIS — I10 ESSENTIAL HYPERTENSION: ICD-10-CM

## 2021-06-25 DIAGNOSIS — I50.32 CHF (CONGESTIVE HEART FAILURE), NYHA CLASS II, CHRONIC, DIASTOLIC (HCC): Primary | ICD-10-CM

## 2021-06-25 PROCEDURE — 1111F DSCHRG MED/CURRENT MED MERGE: CPT | Performed by: NURSE PRACTITIONER

## 2021-06-25 PROCEDURE — G8427 DOCREV CUR MEDS BY ELIG CLIN: HCPCS | Performed by: NURSE PRACTITIONER

## 2021-06-25 PROCEDURE — 3017F COLORECTAL CA SCREEN DOC REV: CPT | Performed by: NURSE PRACTITIONER

## 2021-06-25 PROCEDURE — 99214 OFFICE O/P EST MOD 30 MIN: CPT | Performed by: NURSE PRACTITIONER

## 2021-06-25 PROCEDURE — 4004F PT TOBACCO SCREEN RCVD TLK: CPT | Performed by: NURSE PRACTITIONER

## 2021-06-25 PROCEDURE — G8417 CALC BMI ABV UP PARAM F/U: HCPCS | Performed by: NURSE PRACTITIONER

## 2021-06-25 RX ORDER — BUMETANIDE 0.5 MG/1
1 TABLET ORAL DAILY
Qty: 90 TABLET | Refills: 3
Start: 2021-06-25 | End: 2021-07-26 | Stop reason: DRUGHIGH

## 2021-06-25 RX ORDER — SITAGLIPTIN 100 MG/1
1 TABLET, FILM COATED ORAL DAILY
COMMUNITY
Start: 2021-06-08 | End: 2022-03-16 | Stop reason: ALTCHOICE

## 2021-06-25 RX ORDER — AMLODIPINE BESYLATE 10 MG/1
1 TABLET ORAL DAILY
COMMUNITY
Start: 2021-06-08 | End: 2022-03-08 | Stop reason: ALTCHOICE

## 2021-06-25 ASSESSMENT — ENCOUNTER SYMPTOMS
ABDOMINAL DISTENTION: 0
COUGH: 0
SHORTNESS OF BREATH: 1

## 2021-06-25 NOTE — PROGRESS NOTES
Heart Failure Clinic       Visit Date: 6/25/2021  Cardiologist:  Dr. [facility]  Primary Care Physician: Dr. Soila Sanders, DO    Everton Albright is a 64 y.o. male who presents today for:  Chief Complaint   Patient presents with    Congestive Heart Failure       HPI:   Everton Albright is a 64 y.o. male who presents to the office for a follow up patient visit in the heart failure clinic. Accompanied by no one    TYPE HF: HFrecoveredEF (40-45% - improved to 55-60%)  Device: no  HX: HTN, DM, LAURIE (CPAP), CKD Otila Solo), Partial colectomy (2017), never smoker (wife does)  CardioMEMS implanted 6/16/21     Dry Wt:  350     Hospitalization:  March 2021 - CHF - SOB. + CXR for Pulm congestion.  BNP 1279.  BP meds adjusted.    12/1 = LHC w/ Nallu = nonobstructive disease, elevated LVEDP, PCW 35mmHg - given Lasix 80 IV x 1  [facility] Pulmonary - 3/26 - PFTs = mild restriction likely 2/2 to obesity.   NIOX slightly elevated indicating inflammation - started on Flovent. May 2021 - 361# on admission, 348# at d/c. +CXR for congestion. . Stopped Norvasc and Losartan - d/c'd on Bumex 0.5/day (was on 2mg/day)    Concerns today:  No issues since CardioMEMs implant - states groin site soft. Fatigue is biggest complaint. No \"get up and go\". States he's even fallen back asleep before drinking morning coffee. Has been on Bipap few months (was CPAP approx 15 yrs)  Activity: walked from entrance, little winded. Can complete ADLs  Diet: watching closely.       Patient has:  Chest Pain: no  SOB: AKINS  Orthopnea/PND: no - sleeps in bed  LAURIE: Bipap  Edema: no  Fatigue: yes  Abdominal bloating:  no  Cough: no  Appetite: good  Home weight: stable  Home blood pressure: stable     Past Medical History:   Diagnosis Date    Arthritis     Back problem     CHF with unknown LVEF ([address] 5/24/2021    Constipation     Diabetes mellitus ([address]     Hypertension     Sleep apnea     has CPAP    Thyroid disease      Past Surgical History: tablet under the tongue every 5 minutes as needed for Chest pain (up to 3 doses) 25 tablet 3    Fluticasone furoate-vilanterol (BREO ELLIPTA) 200-25 MCG/INH AEPB inhaler Inhale 1 puff into the lungs daily 3 each 3    rOPINIRole (REQUIP) 0.5 MG tablet Take 1 pill in afternoon and 2 pills at night. 90 tablet 2    amitriptyline (ELAVIL) 10 MG tablet Take 2 tablets by mouth nightly 60 tablet 5    RA ASPIRIN EC 81 MG EC tablet take 1 tablet by mouth once daily 90 tablet 0    oxybutynin (DITROPAN XL) 10 MG extended release tablet Take 1 tablet by mouth daily 90 tablet 3    tamsulosin (FLOMAX) 0.4 MG capsule Take 1 capsule by mouth nightly 90 capsule 3    sacubitril-valsartan (ENTRESTO) 24-26 MG per tablet Take 1 tablet by mouth 2 times daily 60 tablet 5    montelukast (SINGULAIR) 10 MG tablet Take 1 tablet by mouth daily 30 tablet 11    potassium chloride (MICRO-K) 10 MEQ extended release capsule Take 2 capsules by mouth daily (Patient taking differently: Take 20 mEq by mouth nightly ) 60 capsule 0    levothyroxine (SYNTHROID) 200 MCG tablet Take 1 tablet by mouth Daily 30 tablet 1    bismuth subsalicylate (PEPTO BISMOL) 262 MG/15ML suspension Take by mouth daily      glimepiride (AMARYL) 4 MG tablet Take 8 mg by mouth daily       carvedilol (COREG) 25 MG tablet Take 1 tablet by mouth 2 times daily 180 tablet 3    acetaminophen (TYLENOL) 500 MG tablet Take 1 tablet by mouth every 6 hours as needed for Pain 40 tablet 0    cyclobenzaprine (FLEXERIL) 10 MG tablet Take 10 mg by mouth 3 times daily as needed for Muscle spasms      albuterol (PROVENTIL HFA;VENTOLIN HFA) 108 (90 BASE) MCG/ACT inhaler Inhale 1 puff into the lungs every 6 hours as needed for Wheezing.  azelastine (ASTELIN) 137 MCG/SPRAY nasal spray 1 spray by Nasal route as needed. Use in each nostril as directed       No current facility-administered medications for this visit.      Allergies   Allergen Reactions    Shellfish-Derived Products Swelling     Tolerates IV dye without any problems      Pcn [Penicillins] Itching    Succinylcholine      Pseudocholinesterase Deficiency    Morphine Nausea And Vomiting     \"head swimming, skin crawling\"    Tape [Adhesive Tape] Rash       SUBJECTIVE:   Review of Systems   Constitutional: Negative for activity change, appetite change and fatigue. Respiratory: Positive for shortness of breath (AKINS). Negative for cough. Cardiovascular: Negative for chest pain, palpitations and leg swelling. Gastrointestinal: Negative for abdominal distention. Neurological: Negative for weakness, light-headedness and headaches. Hematological: Negative for adenopathy. Psychiatric/Behavioral: Negative for sleep disturbance. OBJECTIVE:   Today's Vitals:  /68   Pulse 83   Ht 6' 1\" (1.854 m)   Wt (!) 350 lb (158.8 kg)   SpO2 95%   BMI 46.18 kg/m²     Physical Exam  Vitals reviewed. Constitutional:       General: He is not in acute distress. Appearance: Normal appearance. He is well-developed. He is obese. He is not diaphoretic. HENT:      Head: Normocephalic and atraumatic. Eyes:      Conjunctiva/sclera: Conjunctivae normal.   Neck:      Comments: No JVD  Cardiovascular:      Rate and Rhythm: Normal rate and regular rhythm. Heart sounds: Normal heart sounds. No murmur heard. Pulmonary:      Effort: Pulmonary effort is normal. No respiratory distress. Breath sounds: Normal breath sounds. No wheezing or rales. Abdominal:      General: Bowel sounds are normal. There is no distension. Palpations: Abdomen is soft. Tenderness: There is no abdominal tenderness. Musculoskeletal:         General: Normal range of motion. Cervical back: Normal range of motion and neck supple. Right lower leg: No edema. Left lower leg: No edema. Skin:     General: Skin is warm and dry. Capillary Refill: Capillary refill takes less than 2 seconds.       Comments: Right groin site soft, no bruising, hematoma or redness. Neurological:      Mental Status: He is alert and oriented to person, place, and time. Coordination: Coordination normal.   Psychiatric:         Behavior: Behavior normal.         Wt Readings from Last 3 Encounters:   06/25/21 (!) 350 lb (158.8 kg)   06/16/21 (!) 345 lb 11.2 oz (156.8 kg)   06/07/21 (!) 350 lb 6.4 oz (158.9 kg)     BP Readings from Last 3 Encounters:   06/25/21 110/68   06/16/21 125/79   06/07/21 138/80     Pulse Readings from Last 3 Encounters:   06/25/21 83   06/16/21 103   06/07/21 77     Body mass index is 46.18 kg/m². ECHO:    Summary   Left ventricle size is normal.   Moderate concentric left ventricular hypertrophy.   There were no regional wall motion abnormalities.   Ejection fraction is visually estimated in the range of 55% to 60%.     Signature      ----------------------------------------------------------------   Electronically signed by Kasie Valle MD (Interpreting   [email or web address]) on 05/25/2021 at 04:48 PM   ----------------------------------------------------------------     CATH/STRESS:   RIGHT HEART CATHETERIZATION:  1.  RA is 25.  2.  RV is 80/23, 27.  3.  PA is 82/44, 59.  4.  Wedge pressure is 35.  5.  LV is 188/28, 36.  6.  AO was 205/126, 160.  7.  AO saturation is 91%. 8.  PA saturation is 63%. 9.  Cardiac output by Cherelle method is 5.9. 10.  Cardiac index is 2.1.     CORONARY ANATOMY:  1.  Right coronary artery is a dominant vessel.  The vessel is quite  large and has mild luminal irregularities in the proximal, mid, and  distal portions of the vessel; otherwise, it is patent. 2.  Left main is patent, gives rise to LAD and left circumflex arteries. 3.  Left circumflex artery is patent without any significant disease. 4.  LAD is patent in the proximal portion, mid portion there is a 30% to  40% stenosis followed by mild luminal irregularities in the distal  portion of the LAD.   5.  LV gram was not performed due to renal dysfunction.     The patient tolerated the procedure well without any significant issues  or complications.  Hemostasis was achieved with a Vasc Band device.     SUMMARY:  1.  Elevated right heart pressures with elevated LVEPD. 2.  Patent coronaries.     RECOMMENDATIONS:  1.  IV diuresis. 2.  Monitor electrolytes. 3.  Optimize heart failure therapy. 4.  Weight loss advised. 5.  Optimize sleep apnea therapies. 6.  Follow up in clinic in one to two weeks to evaluate symptoms  further.        Amadeo Marquez MD     D: 12/06/2020 21:57:38                           Results reviewed:  BNP: No results found for: BNP  CBC:   Lab Results   Component Value Date    WBC 9.0 06/16/2021    RBC 4.64 06/16/2021    RBC 5.15 08/12/2011    HGB 13.2 06/16/2021    HCT 41.9 06/16/2021     06/16/2021     CMP:    Lab Results   Component Value Date     06/16/2021    K 5.0 06/16/2021    K 4.0 05/27/2021     06/16/2021    CO2 24 06/16/2021    BUN 13 06/16/2021    CREATININE 1.2 06/16/2021    LABGLOM 62 06/16/2021    GLUCOSE 169 06/16/2021    CALCIUM 10.3 06/16/2021     Hepatic Function Panel:    Lab Results   Component Value Date    ALKPHOS 76 04/15/2021    ALT 47 04/15/2021    AST 41 04/15/2021    PROT 8.3 04/15/2021    BILITOT 0.5 04/15/2021    BILIDIR <0.2 03/02/2021    LABALBU 4.4 04/15/2021     Magnesium:    Lab Results   Component Value Date    MG 2.3 05/27/2021     PT/INR:    Lab Results   Component Value Date    INR 1.14 06/16/2021     Lipids:    Lab Results   Component Value Date    TRIG 107 05/25/2021    HDL 35 05/25/2021    LDLCALC 96 05/25/2021       ASSESSMENT AND PLAN:   The patient's condition/symptoms are Stable: No clinical evidence of fluid overload today. Continue current medical regimen without changes at present time. Diagnosis Orders   1. CHF (congestive heart failure), NYHA class II, chronic, diastolic (HCC)  Basic Metabolic Panel   2. Essential hypertension     3.  LAURIE (obstructive PM

## 2021-06-25 NOTE — PATIENT INSTRUCTIONS
You may receive a survey regarding the care you received during your visit. Your input is valuable to us. We encourage you to complete and return your survey. We hope you will choose us in the future for your healthcare needs. Continue:  · Continue current medications  · Daily weights and record  · Fluid restriction of 2 Liters per day  · Limit sodium in diet to around 7292-5036 mg/day  · Monitor BP  · Activity as tolerated     Call the Heart Failure Clinic for any of the following symptoms: [phone]   Weight gain of 2-3 pounds in 1 day or 5 pounds in 1 week   Increased shortness of breath   Shortness of breath while laying down   Cough   Chest pain   Swelling in feet, ankles or legs   Tenderness or bloating in the abdomen   Fatigue    Decreased appetite or nausea    Confusion       STOP Plavix on July 16 - continue Aspirin indefinitely.

## 2021-07-08 ENCOUNTER — OFFICE VISIT (OUTPATIENT)
Dept: CARDIOLOGY CLINIC | Age: 61
End: 2021-07-08
Payer: MEDICARE

## 2021-07-08 VITALS
DIASTOLIC BLOOD PRESSURE: 68 MMHG | HEIGHT: 73 IN | WEIGHT: 315 LBS | HEART RATE: 88 BPM | SYSTOLIC BLOOD PRESSURE: 122 MMHG | BODY MASS INDEX: 41.75 KG/M2

## 2021-07-08 DIAGNOSIS — I42.9 CARDIOMYOPATHY, UNSPECIFIED TYPE (HCC): ICD-10-CM

## 2021-07-08 DIAGNOSIS — E66.01 OBESITY, CLASS III, BMI 40-49.9 (MORBID OBESITY) (HCC): ICD-10-CM

## 2021-07-08 DIAGNOSIS — I50.9 CHRONIC CONGESTIVE HEART FAILURE, UNSPECIFIED HEART FAILURE TYPE (HCC): Primary | ICD-10-CM

## 2021-07-08 DIAGNOSIS — I20.8 STABLE ANGINA (HCC): ICD-10-CM

## 2021-07-08 PROCEDURE — 3017F COLORECTAL CA SCREEN DOC REV: CPT | Performed by: INTERNAL MEDICINE

## 2021-07-08 PROCEDURE — 99213 OFFICE O/P EST LOW 20 MIN: CPT | Performed by: INTERNAL MEDICINE

## 2021-07-08 PROCEDURE — 4004F PT TOBACCO SCREEN RCVD TLK: CPT | Performed by: INTERNAL MEDICINE

## 2021-07-08 PROCEDURE — G8427 DOCREV CUR MEDS BY ELIG CLIN: HCPCS | Performed by: INTERNAL MEDICINE

## 2021-07-08 PROCEDURE — G8417 CALC BMI ABV UP PARAM F/U: HCPCS | Performed by: INTERNAL MEDICINE

## 2021-07-08 NOTE — PROGRESS NOTES
[address] [place] [address],[place] [address] [address] [place] [address]  Dept: [phone]  Dept Fax: [phone]  Loc: [phone]    Visit Date: 7/8/2021    Mr. Joaquim Parham is a 64 y.o. male  who presented for:  Chief Complaint   Patient presents with   Rhiannon Solum Check-Up       HPI:   65 yo M  c hx of HTN, DM, Obesity, LAURIE on BIPAP, Cardiomyopathy, colon resection 2/2017 is here for a follow up. Underwent cardioMEMs device for CHF. He comes today doing well without any issues. Today he states his breathign is better. Left heart cath:  Clean coronaries    RIGHT HEART CATHETERIZATION:  1.  RA is 25.  2.  RV is 80/23, 27.  3.  PA is 82/44, 59.  4.  Wedge pressure is 35.  5.  LV is 188/28, 36.  6.  AO was 205/126, 160.  7.  AO saturation is 91%. 8.  PA saturation is 63%. 9.  Cardiac output by Cherelle method is 5.9. 10.  Cardiac index is 2.1. Current Outpatient Medications:     JANUVIA 100 MG tablet, Take 1 tablet by mouth daily, Disp: , Rfl:     amLODIPine (NORVASC) 10 MG tablet, Take 1 tablet by mouth daily, Disp: , Rfl:     bumetanide (BUMEX) 0.5 MG tablet, Take 2 tablets by mouth daily AND as directed by CHF clinic, Disp: 90 tablet, Rfl: 3    clopidogrel (PLAVIX) 75 MG tablet, Take 1 tablet by mouth daily, Disp: 90 tablet, Rfl: 1    spironolactone (ALDACTONE) 50 MG tablet, Take 1 tablet by mouth 2 times daily, Disp: 180 tablet, Rfl: 3    hydrALAZINE (APRESOLINE) 100 MG tablet, Take 1 tablet by mouth every 8 hours, Disp: 270 tablet, Rfl: 3    oxyCODONE-acetaminophen (PERCOCET)  MG per tablet, Take 1 tablet by mouth every 8 hours as needed for Pain for up to 30 days. , Disp: 90 tablet, Rfl: 0    gabapentin (NEURONTIN) 300 MG capsule, Take 1 capsule by mouth nightly for 90 days. , Disp: 90 capsule, Rfl: 0    nitroGLYCERIN (NITROSTAT) 0.4 MG SL tablet, Place 1 tablet under the tongue every 5 minutes as needed for Chest pain (up to 3 doses), Disp: 25 tablet, Rfl: 3    Fluticasone furoate-vilanterol (BREO ELLIPTA) 200-25 MCG/INH AEPB inhaler, Inhale 1 puff into the lungs daily, Disp: 3 each, Rfl: 3    rOPINIRole (REQUIP) 0.5 MG tablet, Take 1 pill in afternoon and 2 pills at night., Disp: 90 tablet, Rfl: 2    amitriptyline (ELAVIL) 10 MG tablet, Take 2 tablets by mouth nightly, Disp: 60 tablet, Rfl: 5    RA ASPIRIN EC 81 MG EC tablet, take 1 tablet by mouth once daily, Disp: 90 tablet, Rfl: 0    oxybutynin (DITROPAN XL) 10 MG extended release tablet, Take 1 tablet by mouth daily, Disp: 90 tablet, Rfl: 3    tamsulosin (FLOMAX) 0.4 MG capsule, Take 1 capsule by mouth nightly, Disp: 90 capsule, Rfl: 3    sacubitril-valsartan (ENTRESTO) 24-26 MG per tablet, Take 1 tablet by mouth 2 times daily, Disp: 60 tablet, Rfl: 5    montelukast (SINGULAIR) 10 MG tablet, Take 1 tablet by mouth daily, Disp: 30 tablet, Rfl: 11    potassium chloride (MICRO-K) 10 MEQ extended release capsule, Take 2 capsules by mouth daily (Patient taking differently: Take 20 mEq by mouth nightly ), Disp: 60 capsule, Rfl: 0    levothyroxine (SYNTHROID) 200 MCG tablet, Take 1 tablet by mouth Daily, Disp: 30 tablet, Rfl: 1    bismuth subsalicylate (PEPTO BISMOL) 262 MG/15ML suspension, Take by mouth daily, Disp: , Rfl:     glimepiride (AMARYL) 4 MG tablet, Take 8 mg by mouth daily , Disp: , Rfl:     carvedilol (COREG) 25 MG tablet, Take 1 tablet by mouth 2 times daily, Disp: 180 tablet, Rfl: 3    acetaminophen (TYLENOL) 500 MG tablet, Take 1 tablet by mouth every 6 hours as needed for Pain, Disp: 40 tablet, Rfl: 0    cyclobenzaprine (FLEXERIL) 10 MG tablet, Take 10 mg by mouth 3 times daily as needed for Muscle spasms, Disp: , Rfl:     albuterol (PROVENTIL HFA;VENTOLIN HFA) 108 (90 BASE) MCG/ACT inhaler, Inhale 1 puff into the lungs every 6 hours as needed for Wheezing., Disp: , Rfl:     azelastine (ASTELIN) 137 MCG/SPRAY nasal spray, 1 spray by Nasal route as needed.  Use in each nostril as directed, Disp: , Rfl:     Past Medical History  Nesha Del Toro  has a past medical history of Arthritis, Back problem, CHF with unknown LVEF ([address] Constipation, Diabetes mellitus ([address] Hypertension, Sleep apnea, and Thyroid disease. Social History  Nesha Del Toro  reports that he has never smoked. His smokeless tobacco use includes chew. He reports that he does not drink alcohol and does not use drugs. Family History  Nesha Del Toro family history includes Arthritis in his brother; Cancer in his father and mother; Diabetes in his paternal grandmother; Heart Attack in his brother; Heart Disease in his father; Prostate Cancer in his brother; Stroke in his brother. Past Surgical History   Past Surgical History:   Procedure Laterality Date    ABDOMEN SURGERY      APPENDECTOMY      CARDIAC CATHETERIZATION      no stents    CARPAL TUNNEL RELEASE Bilateral     COLONOSCOPY  2017    Dr. Chance Rubio, ESOPHAGUS     [address] [facility]      x3 surgeries with 14 repairs    KNEE SURGERY Right 1980's    cartilage    [place] EXPLORATORY OF ABDOMEN N/A 2/5/2018    ABDOMINAL EXPLORATION WITH LYSIS OF COMPLICATED ADHESIONS WITH AN EXTENDED RIGHT COLON RESECTION performed by Deborah Fleming MD at [address] [place] [address] Place:     REVIEW OF SYSTEMS  Constitutional: denies sweats, chills and fever  HENT: denies  congestion, sinus pressure, sneezing and sore throat. Eyes: denies  pain, discharge, redness and itching. Respiratory: denies apnea, cough  Gastrointestinal: denies blood in stool, constipation, diarrhea   Endocrine: denies cold intolerance, heat intolerance, polydipsia. Genitourinary: denies dysuria, enuresis, flank pain and hematuria. Musculoskeletal: denies arthralgias, joint swelling and neck pain. Neurological: denies numbness and headaches. Psychiatric/Behavioral: denies agitation, confusion, decreased concentration and dysphoric mood    All others reviewed and are negative.    Objective:     /68 Pulse 88   Ht 6' 1\" (1.854 m)   Wt (!) 348 lb (157.9 kg)   BMI 45.91 kg/m²     Wt Readings from Last 3 Encounters:   07/08/21 (!) 348 lb (157.9 kg)   06/25/21 (!) 350 lb (158.8 kg)   06/16/21 (!) 345 lb 11.2 oz (156.8 kg)     BP Readings from Last 3 Encounters:   07/08/21 122/68   06/25/21 110/68   06/16/21 125/79       PHYSICAL EXAM  Constitutional: noticeable short of breath, and fatigue  HENT:   Head: Normocephalic and atraumatic. Eyes: EOM are normal. Pupils are equal, round, and reactive to light. Neck: Normal range of motion. Neck supple. No JVD present. Cardiovascular: Normal rate , normal heart sounds and intact distal pulses. Pulmonary/Chest: Pleuritic pain on deep inspiration  Abdominal: Soft. Bowel sounds are normal. No distension. There is no tenderness. Musculoskeletal: Normal range of motion. No edema. Neurological: Alert and oriented to person, place, and time. No cranial nerve deficit. Coordination normal.   Skin: Skin is warm and dry. Psychiatric: Normal mood and affect.        No results found for: CKTOTAL, CKMB, CKMBINDEX    Lab Results   Component Value Date    WBC 9.0 06/16/2021    RBC 4.64 06/16/2021    RBC 5.15 08/12/2011    HGB 13.2 06/16/2021    HCT 41.9 06/16/2021    MCV 90.3 06/16/2021    MCH 28.4 06/16/2021    MCHC 31.5 06/16/2021    RDW 14.7 02/09/2018     06/16/2021    MPV 8.6 06/16/2021       Lab Results   Component Value Date     06/16/2021    K 5.0 06/16/2021    K 4.0 05/27/2021     06/16/2021    CO2 24 06/16/2021    BUN 13 06/16/2021    LABALBU 4.4 04/15/2021    CREATININE 1.2 06/16/2021    CALCIUM 10.3 06/16/2021    LABGLOM 62 06/16/2021    GLUCOSE 169 06/16/2021       Lab Results   Component Value Date    ALKPHOS 76 04/15/2021    ALT 47 04/15/2021    AST 41 04/15/2021    PROT 8.3 04/15/2021    BILITOT 0.5 04/15/2021    BILIDIR <0.2 03/02/2021    LABALBU 4.4 04/15/2021       Lab Results   Component Value Date    MG 2.3 05/27/2021       Lab Results   Component Value Date    INR 1.14 (H) 06/16/2021    INR 1.06 12/01/2020    INR 1.03 07/02/2019         Lab Results   Component Value Date    LABA1C 9.3 05/24/2021       Lab Results   Component Value Date    TRIG 107 05/25/2021    HDL 35 05/25/2021    LDLCALC 96 05/25/2021       Lab Results   Component Value Date    TSH 3.090 04/15/2021         Testing Reviewed:      I haveindividually reviewed the below cardiac tests    EKG:    ECHO:   Results for orders placed during the hospital encounter of 04/26/19   ECHO Complete 2D W Doppler W Color    Narrative Transthoracic Echocardiography Report (TTE)     Demographics      Patient Name   Armen Colon  Gender              Male                  A      MR #           [id number]       Race                                                     Ethnicity      Account #      [de-identified]       Room Number      Accession      [id number]       Date of Study       04/26/2019   Number      Date of Birth  1960      Referring Physician Devon Madsen DO      Age            66 year(s)      Shelley Matthews                                                      [place]                                     Interpreting        Shannan Alberto MD                                  Physician     Procedure    Type of Study      TTE procedure:ECHOCARDIOGRAM COMPLETE 2D W DOPPLER W COLOR. Procedure Date  Date: 04/26/2019 Start: 07:22 AM    Study Location: Echo Lab  Technical Quality: Poor visualization due to body habitus. Indications:Dyspnea on exertion. Additional Medical History:Sleep apnea, hypertension, hypothyroidism    Patient Status: Routine    Height: 73 inches Weight: 352.01 pounds BSA: 2.74 m^2 BMI: 46.44 kg/m^2    BP: 182/92 mmHg     Conclusions      Summary   Technically difficult examination.    Left ventricular size is normal and systolic function is mildly reduced. Ejection fraction was estimated at 40-45%. LV wall thickness is within   normal limits. The right ventricular size appears normal with normal systolic function   and wall thickness. Signature      ----------------------------------------------------------------   Electronically signed by Felicity Carter MD (Interpreting   physician) on 04/26/2019 at 05:05 PM   ----------------------------------------------------------------      Findings      Mitral Valve   The mitral valve was not well visualized . DOPPLER: The transmitral velocity was within the normal range with no   evidence for mitral stenosis. There was no evidence of mitral   regurgitation. Aortic Valve   The aortic valve leaflets were not well visualized. DOPPLER: Transaortic velocity was within the normal range with no evidence   of aortic stenosis. There was no evidence of aortic regurgitation. Tricuspid Valve   Tricuspid valve was not well visualized. DOPPLER: There is no evidence of tricuspid stenosis. There was no evidence   of tricuspid regurgitation. Pulmonic Valve   The pulmonic valve was not well visualized . [place] DOPPLER: The transpulmonic velocity was within the normal range. No   evidence for regurgitation. Left Atrium   Left atrial size is normal.      Left Ventricle   Left ventricular size is normal and systolic function is mildly reduced. Ejection fraction was estimated at 40-45%. LV wall thickness is within   normal limits. Right Atrium   Right atrial size was normal.      Right Ventricle   The right ventricular size appears normal with normal systolic function   and wall thickness. Pericardial Effusion   The pericardium appears normal with no evidence of a pericardial effusion. Pleural Effusion   No evidence of pleural effusion. Aorta / Great Vessels   -Aortic root dimension within normal limits. -IVC size is within normal limits with normal respiratory phasic changes. M-Mode/2D Measurements & Calculations      LV Diastolic   LV Systolic Dimension:    AV Cusp Separation: 2.3 cmLA   Dimension: 5.1 3.9 cm                    Dimension: 4.8 cmAO Root   cm             LV Volume Diastolic: 458  Dimension: 3.8 cmLA Area: 20.8   LV FS:23.5 %   ml                        cm^2   LV PW          LV Volume Systolic: 21.6   Diastolic: 1.4 ml   cm             LV EDV/LV EDV Index: 124   Septum         ml/45 m^2LV ESV/LV ESV    RV Diastolic Dimension: 3.4 cm   Diastolic: 1.4 Index: 09.0 ml/24 m^2   cm             EF Calculated: 46.9 %     LA/Aorta: 1.26                                            Ascending Aorta: 3.6 cm                                            LA volume/Index: 51.9 ml /19m^2     Doppler Measurements & Calculations      MV Peak E-Wave: 89.7  AV Peak Velocity: 155 LVOT Peak Velocity: 109 cm/s   cm/s                  cm/s                  LVOT Mean Velocity: 71.6 cm/s   MV Peak A-Wave: 81.9  AV Peak Gradient:     LVOT Peak Gradient: 5 mmHgLVOT   cm/s                  9.61 mmHg             Mean Gradient: 2 mmHg   MV E/A Ratio: 1.1     AV Mean Velocity:   MV Peak Gradient:     91.2 cm/s             TV Peak E-Wave: 29.8 cm/s   3.22 mmHg             AV Mean Gradient: 4   TV Peak A-Wave: 42.7 cm/s                         mmHg   MV Deceleration Time: AV VTI: 28.5 cm       TV Peak Gradient: 0.36 mmHg   268 msec   MV P1/2t: 78 msec                           PV Peak Velocity: 69.7 cm/s   MVA by PHT:2.82 cm^2  LVOT VTI: 24.8 cm     PV Peak Gradient: 1.94 mmHg                         IVRT: 95 msec   MV E' Septal   Velocity: 5.5 cm/s   MV A' Septal          AV DVI (VTI): 0.87AV   Velocity: 7.9 cm/s    DVI (Vmax):0.7   MV E' Lateral   Velocity: 9.4 cm/s   MV A' Lateral   Velocity: 11.3 cm/s   E/E' septal: 16.31   E/E' lateral: 9.54   MR Velocity: 373 cm/s [id number]%[id number]       STRESS:    CATH:    Assessment/Plan       Diagnosis Orders   1. Chronic congestive heart failure, unspecified heart failure type (HCC)       Chronic CHF exacerbation, s/p CardioMEMs insertion on 6/16/21  Cardiomyopathy, EF improved to 55-60%  Morbid Obesity  LAURIE on BiPAP  DM  HTN-mildly uncontrolled  Palpitations     Doing well  Continue DAPT  Ontinue bumex   toleratign well, no side effects  Has CHF clinic follow up  Advised to use the cardiomems pillow regularly  Might need BIPAP therapy  Continue Bumex for now     Return in about 6 months (around 1/8/2022), or if symptoms worsen or fail to improve, for Review testing, Regular follow up.        Electronically signed by Jamarcus Al MD [facility]  7/8/2021 at 1:12 PM

## 2021-07-12 ENCOUNTER — PATIENT MESSAGE (OUTPATIENT)
Dept: PHYSICAL MEDICINE AND REHAB | Age: 61
End: 2021-07-12

## 2021-07-12 DIAGNOSIS — G89.4 CHRONIC PAIN SYNDROME: Primary | ICD-10-CM

## 2021-07-12 RX ORDER — OXYCODONE AND ACETAMINOPHEN 10; 325 MG/1; MG/1
1 TABLET ORAL EVERY 8 HOURS PRN
Qty: 90 TABLET | Refills: 0 | Status: SHIPPED | OUTPATIENT
Start: 2021-07-12 | End: 2021-08-09 | Stop reason: SDUPTHER

## 2021-07-12 NOTE — TELEPHONE ENCOUNTER
From: Dyan Linn  To:  SAHIL Rock - CNP  Sent: 7/12/2021 1:42 PM EDT  Subject: Prescription Question    Hi would it be possible to get a refill for my Percocet 10/325 thanks Mary Ellen Miller

## 2021-07-15 ENCOUNTER — PATIENT MESSAGE (OUTPATIENT)
Dept: PHYSICAL MEDICINE AND REHAB | Age: 61
End: 2021-07-15

## 2021-07-16 RX ORDER — GABAPENTIN 300 MG/1
300 CAPSULE ORAL NIGHTLY
Qty: 30 CAPSULE | Refills: 0 | Status: SHIPPED | OUTPATIENT
Start: 2021-07-16 | End: 2021-08-09 | Stop reason: SDUPTHER

## 2021-07-16 NOTE — TELEPHONE ENCOUNTER
From: Grady Aid  To:  SAHIL Rodriguez - CNP  Sent: 7/15/2021 11:03 PM EDT  Subject: Prescription Question    Hello would it be possible to get a refill on my gabapentin 300MG thanks Barbara Signs

## 2021-07-19 ENCOUNTER — TELEPHONE (OUTPATIENT)
Dept: CARDIOLOGY CLINIC | Age: 61
End: 2021-07-19

## 2021-07-19 DIAGNOSIS — I50.9 CHRONIC CONGESTIVE HEART FAILURE, UNSPECIFIED HEART FAILURE TYPE (HCC): Primary | ICD-10-CM

## 2021-07-19 NOTE — TELEPHONE ENCOUNTER
Called patient regarding overdue labs    Patient will go tomorrow am    Patient stated he has some swelling in his lower legs and feet and had to stop on the landing going up steps due to SOB

## 2021-07-20 NOTE — TELEPHONE ENCOUNTER
CardioMems has been stable - actually improving some over past few weeks  Will call if labs are abnormal.

## 2021-07-21 ENCOUNTER — HOSPITAL ENCOUNTER (OUTPATIENT)
Age: 61
Discharge: HOME | End: 2021-07-21
Payer: MEDICARE

## 2021-07-21 DIAGNOSIS — N18.2 CKD (CHRONIC KIDNEY DISEASE), STAGE II: ICD-10-CM

## 2021-07-21 DIAGNOSIS — I10 ESSENTIAL HYPERTENSION: ICD-10-CM

## 2021-07-21 DIAGNOSIS — I50.32 CHF (CONGESTIVE HEART FAILURE), NYHA CLASS II, CHRONIC, DIASTOLIC (HCC): Primary | ICD-10-CM

## 2021-07-21 DIAGNOSIS — I51.9 CHRONIC SYSTOLIC DYSFUNCTION OF LEFT VENTRICLE: ICD-10-CM

## 2021-07-21 DIAGNOSIS — I50.32 CHF (CONGESTIVE HEART FAILURE), NYHA CLASS II, CHRONIC, DIASTOLIC (HCC): ICD-10-CM

## 2021-07-21 LAB
ANION GAP SERPL CALCULATED.3IONS-SCNC: 12 MEQ/L (ref 8–16)
BUN BLDV-MCNC: 16 MG/DL (ref 7–22)
CALCIUM SERPL-MCNC: 9.6 MG/DL (ref 8.5–10.5)
CHLORIDE BLD-SCNC: 100 MEQ/L (ref 98–111)
CO2: 23 MEQ/L (ref 23–33)
CREAT SERPL-MCNC: 1.2 MG/DL (ref 0.4–1.2)
GFR SERPL CREATININE-BSD FRML MDRD: 62 ML/MIN/1.73M2
GLUCOSE BLD-MCNC: 175 MG/DL (ref 70–108)
MAGNESIUM: 2.1 MG/DL (ref 1.6–2.4)
POTASSIUM SERPL-SCNC: 4.9 MEQ/L (ref 3.5–5.2)
SODIUM BLD-SCNC: 135 MEQ/L (ref 135–145)

## 2021-07-21 PROCEDURE — 93264 REM MNTR WRLS P-ART PRS SNR: CPT | Performed by: NURSE PRACTITIONER

## 2021-07-21 PROCEDURE — 82570 ASSAY OF URINE CREATININE: CPT

## 2021-07-21 PROCEDURE — 80048 BASIC METABOLIC PNL TOTAL CA: CPT

## 2021-07-21 PROCEDURE — 36415 COLL VENOUS BLD VENIPUNCTURE: CPT

## 2021-07-21 PROCEDURE — 83735 ASSAY OF MAGNESIUM: CPT

## 2021-07-21 PROCEDURE — 84156 ASSAY OF PROTEIN URINE: CPT

## 2021-07-22 LAB
CREATININE URINE: 42.4 MG/DL
PROT/CREAT RATIO, UR: 0.1
PROTEIN, URINE: 4.2 MG/DL

## 2021-07-22 NOTE — TELEPHONE ENCOUNTER
Verbal order Zollie Inch CNP    Double bumex and potassium for 2 days  Call Monday with update       Patient notified and verbalized understanding

## 2021-07-22 NOTE — TELEPHONE ENCOUNTER
Spoke with patient. He is having weight gain, swelling, bloating, SOB even when just walking in the house. Weight 7/15- 343.7 lb and has steadily went up, today 349.0 lb. He did say he was having stomach problems, diarrhea. PCP told him it was Cdiff and started him on ATB. Diarrhea has since stopped. Denied any appetite changes. Also, patient asking if he needs to continue Plavix. Dr Antonio Polanco prescribed 6 months worth but Augusto Nissen, CNP OV note from 6/25/21:         CardioMEMS implant note from Doris:      Patient asking if he needs to continue Plavix?

## 2021-07-23 ENCOUNTER — OFFICE VISIT (OUTPATIENT)
Dept: NEPHROLOGY | Age: 61
End: 2021-07-23
Payer: MEDICARE

## 2021-07-23 VITALS
SYSTOLIC BLOOD PRESSURE: 125 MMHG | BODY MASS INDEX: 41.75 KG/M2 | DIASTOLIC BLOOD PRESSURE: 76 MMHG | OXYGEN SATURATION: 96 % | HEART RATE: 81 BPM | TEMPERATURE: 98.4 F | WEIGHT: 315 LBS | HEIGHT: 73 IN

## 2021-07-23 DIAGNOSIS — I10 ESSENTIAL HYPERTENSION: ICD-10-CM

## 2021-07-23 DIAGNOSIS — N18.2 CKD (CHRONIC KIDNEY DISEASE), STAGE II: Primary | ICD-10-CM

## 2021-07-23 PROCEDURE — G8417 CALC BMI ABV UP PARAM F/U: HCPCS | Performed by: INTERNAL MEDICINE

## 2021-07-23 PROCEDURE — 99213 OFFICE O/P EST LOW 20 MIN: CPT | Performed by: INTERNAL MEDICINE

## 2021-07-23 PROCEDURE — G8427 DOCREV CUR MEDS BY ELIG CLIN: HCPCS | Performed by: INTERNAL MEDICINE

## 2021-07-23 PROCEDURE — 4004F PT TOBACCO SCREEN RCVD TLK: CPT | Performed by: INTERNAL MEDICINE

## 2021-07-23 PROCEDURE — 3017F COLORECTAL CA SCREEN DOC REV: CPT | Performed by: INTERNAL MEDICINE

## 2021-07-23 NOTE — PROGRESS NOTES
[facility]    [facility], [address]   [address], [place]  [phone]  Progress Note  7/23/2021 11:11 AM        Pt Name:    Tran Kendall  YOB: 1960  Primary Care Physician:  Naomi Null DO     Chief Complaint:   Chief Complaint   Patient presents with    Follow-up: HTN, CKD  . Background Information/Interval History:   60 male with HTN, hernia surgeries, obesity, DM, chronic constipation, thyroid disorder, BPH, hx abd exp surgery with JENNY due to recurrent small bowel obstructions, chronic loose stools, chronic hypokalemia secondary to GI losses who is here for HTN follow-up.      Here for 1 year follow-up. Seeing cardiologist. Has cardioMEMS procedure. Following with CHF. Bp is stable, weights are overall stable. Reports sugars are usually in 170-180 range at home. Does not offer any complaints at this time. Overall he feels well.      Past History:  Past Medical History:   Diagnosis Date    Arthritis     Back problem     [facility]F with unknown LVEF ([facility] [address] 5/24/2021    Constipation     Diabetes mellitus ([facility] [address]     Hypertension     Sleep apnea     has CPAP    Thyroid disease      Past Surgical History:   Procedure Laterality Date    ABDOMEN SURGERY      APPENDECTOMY      CARDIAC CATHETERIZATION      no stents    CARPAL TUNNEL RELEASE Bilateral     COLONOSCOPY  2017    Dr. Yves Carter, [address]      x3 surgeries with 14 repairs    KNEE SURGERY Right 1980's    cartilage    CA EXPLORATORY OF ABDOMEN N/A 2/5/2018    ABDOMINAL EXPLORATION WITH LYSIS OF COMPLICATED ADHESIONS WITH AN EXTENDED RIGHT COLON RESECTION performed by Connor Penaloza MD at [address]:  /76 (Site: Left Lower Arm, Position: Sitting, Cuff Size: Large Adult)   Pulse 81   Temp 98.4 °F (36.9 °C) (Temporal)   Ht 6' 1\" (1.854 m)   Wt (!) 352 lb (159.7 kg)   SpO2 96%   BMI 46.44 kg/m²   Wt Readings from Last 3 Encounters:   07/23/21 (!) 352 lb (159.7 kg)   07/08/21 (!) 348 lb (157.9 kg)   06/25/21 (!) 350 lb (158.8 kg)     Body mass index is 46.44 kg/m². General Appearance: alert and cooperative with exam, appears comfortable, no distress  Oral: moist oral mucus membranes  Neck: No jugular venous distention  Lungs: Air entry B/L, no crackles or rales, no use of accessory muscles  Heart: S1, S2 heard  GI: soft, non-tender, no guarding, obese  Extremities: No sig LE edema     Medications:  Current Outpatient Medications   Medication Sig Dispense Refill    gabapentin (NEURONTIN) 300 MG capsule Take 1 capsule by mouth nightly for 30 days. 30 capsule 0    oxyCODONE-acetaminophen (PERCOCET)  MG per tablet Take 1 tablet by mouth every 8 hours as needed for Pain for up to 30 days. Intended supply: 30 days 90 tablet 0    JANUVIA 100 MG tablet Take 1 tablet by mouth daily      amLODIPine (NORVASC) 10 MG tablet Take 1 tablet by mouth daily      bumetanide (BUMEX) 0.5 MG tablet Take 2 tablets by mouth daily AND as directed by CHF clinic 90 tablet 3    spironolactone (ALDACTONE) 50 MG tablet Take 1 tablet by mouth 2 times daily 180 tablet 3    hydrALAZINE (APRESOLINE) 100 MG tablet Take 1 tablet by mouth every 8 hours 270 tablet 3    nitroGLYCERIN (NITROSTAT) 0.4 MG SL tablet Place 1 tablet under the tongue every 5 minutes as needed for Chest pain (up to 3 doses) 25 tablet 3    Fluticasone furoate-vilanterol (BREO ELLIPTA) 200-25 MCG/INH AEPB inhaler Inhale 1 puff into the lungs daily 3 each 3    rOPINIRole (REQUIP) 0.5 MG tablet Take 1 pill in afternoon and 2 pills at night.  90 tablet 2    amitriptyline (ELAVIL) 10 MG tablet Take 2 tablets by mouth nightly 60 tablet 5    RA ASPIRIN EC 81 MG EC tablet take 1 tablet by mouth once daily 90 tablet 0    oxybutynin (DITROPAN XL) 10 MG extended release tablet Take 1 tablet by mouth daily 90 tablet 3    tamsulosin (FLOMAX) 0.4 MG capsule Take 1 capsule by mouth nightly 90 capsule 3    sacubitril-valsartan (ENTRESTO) 24-26 MG per tablet Take 1 tablet by mouth 2 times daily 60 tablet 5    montelukast (SINGULAIR) 10 MG tablet Take 1 tablet by mouth daily 30 tablet 11    potassium chloride (MICRO-K) 10 MEQ extended release capsule Take 2 capsules by mouth daily (Patient taking differently: Take 20 mEq by mouth nightly ) 60 capsule 0    levothyroxine (SYNTHROID) 200 MCG tablet Take 1 tablet by mouth Daily 30 tablet 1    bismuth subsalicylate (PEPTO BISMOL) 262 MG/15ML suspension Take by mouth daily      glimepiride (AMARYL) 4 MG tablet Take 8 mg by mouth daily       carvedilol (COREG) 25 MG tablet Take 1 tablet by mouth 2 times daily 180 tablet 3    acetaminophen (TYLENOL) 500 MG tablet Take 1 tablet by mouth every 6 hours as needed for Pain 40 tablet 0    cyclobenzaprine (FLEXERIL) 10 MG tablet Take 10 mg by mouth 3 times daily as needed for Muscle spasms      albuterol (PROVENTIL HFA;VENTOLIN HFA) 108 (90 BASE) MCG/ACT inhaler Inhale 1 puff into the lungs every 6 hours as needed for Wheezing.  azelastine (ASTELIN) 137 MCG/SPRAY nasal spray 1 spray by Nasal route as needed. Use in each nostril as directed       No current facility-administered medications for this visit. Laboratory & Diagnostics:  Feb 2018: R 14.3, L 15.4, Two simple renal cysts  K 4.5, Creat 1.3   June 2018: K 2.8, creat 1.0, Aldosterone 31, Renin 0.5  Sept 2018: K 3.3  Dec 2018: K 4.3, Creat 1.0, Mg 2.0  April EF 40-45%  June 2019: K 4.5, creat 1.3, Mg 1.9, UPCR 610 mg/g  Aug 2019: K 4.4, Creat 1.0, Mg 2.1    Jan 2020: K 4.2, Creat 1.0, UPCR 240 mg/g  July 2020: K 4.2, Creat 0.9  July 2021: K 4.9, creat 1.2, Mg 2.1, UPCR 100 mg/g     Impression/Plan:   1. HTN: stable on current medications.  really need to loose weight, advised lifestyle modification. 2. CKD II: overall stable renal function. CKd due to HTN nephrosclerosis. Creatinine is 1.2 and at baseline.    3. Chronic diastolic dysfunction with Cardio MEMS: CHF clinic is managing diuretics and potassium. Patient is on Aldactone, Entresto as well as potassium supplementation per CHF clinic. Monitor potassium closely. 4. Hx colon surgery/colon resection (Feb 2017): chronic diarrhea  5. Obesity: strongly advised weight loss and lifestyle modification  6. B/L renal simple cysts: US done in Feb 2018  7. DM  8. Sleep apnea: on CPAP machine    Orders Placed This Encounter   Procedures    Basic Metabolic Panel    Protein / creatinine ratio, urine    Magnesium     Return in about 1 year (around 7/23/2022).         Minor MD Ricardo  [facility]

## 2021-07-26 RX ORDER — BUMETANIDE 0.5 MG/1
2 TABLET ORAL DAILY
Qty: 90 TABLET | Refills: 3 | Status: SHIPPED
Start: 2021-07-26 | End: 2021-08-04 | Stop reason: SDUPTHER

## 2021-07-26 NOTE — TELEPHONE ENCOUNTER
Patient notified and verbalized understanding     Dr. Janelle Webber patient stated you talked to him about \"CHF therapy\" but he hasn't heard anything? No recent intervention and patient is diastolic CHF so he does not qualify for cardiac rehab       Other CHF therapy you were thinking?

## 2021-07-26 NOTE — TELEPHONE ENCOUNTER
CardioMems is up slighty. Patricia Hinojosa Have him continue Bumex 2 mg/day and kcl 20/day and get blood work in week to 10 days. May keep him at this dose. Unfortunately he doesn't qualify for cardiac rehab with diastolic. The [facility] or [facility] would be option he could look into.

## 2021-08-02 ENCOUNTER — TELEPHONE (OUTPATIENT)
Dept: CARDIOLOGY CLINIC | Age: 61
End: 2021-08-02

## 2021-08-02 ENCOUNTER — OFFICE VISIT (OUTPATIENT)
Dept: PHYSICAL MEDICINE AND REHAB | Age: 61
End: 2021-08-02
Payer: MEDICARE

## 2021-08-02 ENCOUNTER — TELEPHONE (OUTPATIENT)
Dept: PHYSICAL MEDICINE AND REHAB | Age: 61
End: 2021-08-02

## 2021-08-02 VITALS
HEIGHT: 73 IN | BODY MASS INDEX: 41.75 KG/M2 | SYSTOLIC BLOOD PRESSURE: 124 MMHG | WEIGHT: 315 LBS | DIASTOLIC BLOOD PRESSURE: 74 MMHG

## 2021-08-02 DIAGNOSIS — M48.062 SPINAL STENOSIS OF LUMBAR REGION WITH NEUROGENIC CLAUDICATION: ICD-10-CM

## 2021-08-02 DIAGNOSIS — G89.29 CHRONIC PAIN OF RIGHT KNEE: ICD-10-CM

## 2021-08-02 DIAGNOSIS — M47.816 SPONDYLOSIS OF LUMBAR REGION WITHOUT MYELOPATHY OR RADICULOPATHY: ICD-10-CM

## 2021-08-02 DIAGNOSIS — G89.29 CHRONIC RIGHT SHOULDER PAIN: ICD-10-CM

## 2021-08-02 DIAGNOSIS — M25.511 CHRONIC RIGHT SHOULDER PAIN: ICD-10-CM

## 2021-08-02 DIAGNOSIS — M17.11 PRIMARY OSTEOARTHRITIS OF RIGHT KNEE: Primary | ICD-10-CM

## 2021-08-02 DIAGNOSIS — M47.816 LUMBAR FACET ARTHROPATHY: ICD-10-CM

## 2021-08-02 DIAGNOSIS — M54.17 LUMBOSACRAL RADICULITIS: ICD-10-CM

## 2021-08-02 DIAGNOSIS — F11.90 CHRONIC, CONTINUOUS USE OF OPIOIDS: ICD-10-CM

## 2021-08-02 DIAGNOSIS — G89.4 CHRONIC PAIN SYNDROME: ICD-10-CM

## 2021-08-02 DIAGNOSIS — M25.561 CHRONIC PAIN OF RIGHT KNEE: ICD-10-CM

## 2021-08-02 DIAGNOSIS — G62.9 NEUROPATHY: ICD-10-CM

## 2021-08-02 PROCEDURE — G8417 CALC BMI ABV UP PARAM F/U: HCPCS | Performed by: NURSE PRACTITIONER

## 2021-08-02 PROCEDURE — 3017F COLORECTAL CA SCREEN DOC REV: CPT | Performed by: NURSE PRACTITIONER

## 2021-08-02 PROCEDURE — G8427 DOCREV CUR MEDS BY ELIG CLIN: HCPCS | Performed by: NURSE PRACTITIONER

## 2021-08-02 PROCEDURE — 4004F PT TOBACCO SCREEN RCVD TLK: CPT | Performed by: NURSE PRACTITIONER

## 2021-08-02 PROCEDURE — 99214 OFFICE O/P EST MOD 30 MIN: CPT | Performed by: NURSE PRACTITIONER

## 2021-08-02 ASSESSMENT — ENCOUNTER SYMPTOMS
CHEST TIGHTNESS: 1
SHORTNESS OF BREATH: 1
BACK PAIN: 1
COUGH: 1

## 2021-08-02 NOTE — PROGRESS NOTES
[address] [facility] [address]  Dept: [phone]  Dept Fax: [phone]: [phone]    Visit Date: 8/2/2021    Functionality Assessment/Goals Worksheet     On a scale of 0 (Does not Interfere) to 10 (Completely Interferes)     1. Which number describes how during the past week pain has interfered with       the following:  A. General Activity:  8  B. Mood: 8  C. Walking Ability:  8  D. Normal Work (Includes both work outside the home and housework):  9  E. Relations with Other People:   7  F. Sleep:   9  G. Enjoyment of Life:   2    2. Patient Prefers to Take their Pain Medications:     [x]  On a regular basis   []  Only when necessary    []  Does not take pain medications    3. What are the Patient's Goals/Expectations for Visiting Pain Management? [x]  Learn about my pain    []  Receive Medication   []  Physical Therapy     []  Treat Depression   []  Receive Injections    []  Treat Sleep   []  Deal with Anxiety and Stress   []  Treat Opoid Dependence/Addiction   []  Other:      HPI:   Monica Robertson is a 64 y.o. male is here today for    Chief Complaint: Low back pain, leg pain, right knee pain     HPI   10 week FU. Continues to have pain in lower back radiating across lower back and down into buttocks/ SI area and down right lower extremity posteriorly to feet. Pain is constant and is described as a \"tooth ache pain in back and buttocks\" in right leg is burning stabbing and numbness. Pain is up and down     Continues to have pain in right knee- aching, stabbing, throbbing increased with walking   Current pain medications remain effective in decreasing pain to a tolerable level  Pain increases with bending, lifting, twisting , walking, standing and getting up and down. Medications reviewed. Patient denies side effects with medications.  Patient states he is taking medications as prescribed. Hedenies receiving pain medications from other sources. He denies any ER visits since last visit. Pain scale with out pain medications or at its worst is 9/10. Pain scale with pain medications or at its best is 4-5/10. Last dose of Percocet and neurontin was today   Drug screen reviewed from 5/24/2021 and was appropriate  Pill count was completed today and was appropriate Pt is out of meds:   Patient does not have naloxone available at home. Patient has not required use of naloxone at home since last office visit. The patientis allergic to shellfish-derived products, pcn [penicillins], succinylcholine, morphine, and tape [adhesive tape]. Subjective:      Review of Systems   Constitutional: Negative. Negative for fever. Respiratory: Positive for cough, chest tightness and shortness of breath. Cardiovascular: Positive for leg swelling. Musculoskeletal: Positive for arthralgias, back pain, gait problem, joint swelling, myalgias and neck stiffness. Negative for neck pain. Skin: Negative. Neurological: Positive for weakness and numbness. Psychiatric/Behavioral: Negative. Objective:     Vitals:    08/02/21 0839   BP: 124/74   Weight: (!) 352 lb (159.7 kg)   Height: 6' 1\" (1.854 m)       Physical Exam  Constitutional:       General: He is not in acute distress. Appearance: Normal appearance. He is obese. He is not ill-appearing, toxic-appearing or diaphoretic. HENT:      Head: Normocephalic and atraumatic. Right Ear: External ear normal. There is no impacted cerumen. Left Ear: External ear normal. There is no impacted cerumen. Nose: Nose normal. No congestion or rhinorrhea. Mouth/Throat:      Mouth: Mucous membranes are moist.      Pharynx: Oropharynx is clear. Eyes:      Extraocular Movements: Extraocular movements intact. Conjunctiva/sclera: Conjunctivae normal.      Pupils: Pupils are equal, round, and reactive to light. Cardiovascular:      Rate and Rhythm: Normal rate and regular rhythm. Pulses: Normal pulses. Heart sounds: Murmur heard. Pulmonary:      Effort: Pulmonary effort is normal. No respiratory distress. Breath sounds: Normal breath sounds. Abdominal:      General: Abdomen is flat. Bowel sounds are normal. There is no distension. Palpations: Abdomen is soft. There is no mass. Tenderness: There is no abdominal tenderness. Hernia: No hernia is present. Musculoskeletal:         General: Tenderness present. Right shoulder: Tenderness present. Decreased range of motion. Right wrist: Tenderness present. Decreased range of motion. Left wrist: Tenderness present. Decreased range of motion. Cervical back: Neck supple. Tenderness and bony tenderness present. Muscular tenderness present. Thoracic back: Bony tenderness present. Decreased range of motion. Lumbar back: Tenderness and bony tenderness present. Decreased range of motion. Back:       Right hip: Decreased range of motion. Decreased strength. Left hip: Decreased range of motion. Decreased strength. Right knee: Swelling present. Decreased range of motion. Tenderness present. Right lower leg: Edema present. Left lower leg: Edema present. Right ankle: Swelling present. Tenderness present. Decreased range of motion. Left ankle: Tenderness present. Skin:     General: Skin is warm and dry. Capillary Refill: Capillary refill takes less than 2 seconds. Neurological:      General: No focal deficit present. Mental Status: He is alert and oriented to person, place, and time. Sensory: Sensory deficit present. Motor: Weakness present. Coordination: Romberg sign positive. Coordination abnormal.      Gait: Gait abnormal.      Deep Tendon Reflexes:      Reflex Scores:       Tricep reflexes are 2+ on the right side and 2+ on the left side.        Bicep reflexes are 2+ on the right side and 2+ on the left side. Brachioradialis reflexes are 2+ on the right side and 2+ on the left side. Patellar reflexes are 1+ on the right side and 1+ on the left side. Achilles reflexes are 1+ on the right side and 1+ on the left side. Comments: 4/5/strength bilateral lower extremities    + bilateral SLR at 40 degrees    Decraesed sensation bilateral feet    Psychiatric:         Mood and Affect: Mood normal.         Behavior: Behavior normal.       SAUNDRA test: + bilaterally   Yeomans test: + bilaterally   Gaenslen test: + bilaterally      Assessment:     1. Primary osteoarthritis of right knee    2. Chronic pain of right knee    3. Lumbosacral radiculitis    4. Spinal stenosis of lumbar region with neurogenic claudication    5. Spondylosis of lumbar region without myelopathy or radiculopathy    6. Lumbar facet arthropathy    7. Neuropathy    8. Chronic right shoulder pain    9. Chronic pain syndrome    10. Chronic, continuous use of opioids            Plan:      · OARRS reviewed. Current MED: 45.00  · Patient was offered naloxone for home. · Discussed long term side effects of medications, tolerance, dependency and addiction. · Previous UDS reviewed  · UDS preformed today for compliance. · Patient told can not receive any pain medications from any other source. · No evidence of abuse, diversion or aberrant behavior.  Medications and/or procedures to improve function and quality of life- patient understanding with this and that may not be pain free   Discussed with patient about safe storage of medications at home   Discussed possible weaning of medication dosing dependent on treatment/procedure results.  Discussed with patient about risks with procedure including infection, reaction to medication, increased pain, or bleeding. · Reviewed L-Xray and L-MRI in detail, reviewed right knee and shoulder xray  · Plan right knee steroid injection in [place].

## 2021-08-02 NOTE — TELEPHONE ENCOUNTER
Pre op Risk Assessment    Procedure Right knee steroid injection  Physician [facility]  Date of surgery/procedure TBD    Last OV 7-8-21  Last Stress 4-26-19  Last Echo 5-24-21  Last Cath 12-1-2020  Last Stent   Is patient on blood thinners ASA  Hold Meds/how many days 5-7 days    Fax form to [phone]

## 2021-08-04 ENCOUNTER — TELEPHONE (OUTPATIENT)
Dept: CARDIOLOGY CLINIC | Age: 61
End: 2021-08-04

## 2021-08-04 RX ORDER — BUMETANIDE 1 MG/1
1 TABLET ORAL DAILY
Qty: 60 TABLET | Refills: 6 | Status: SHIPPED | OUTPATIENT
Start: 2021-08-04 | End: 2021-09-08 | Stop reason: SDUPTHER

## 2021-08-04 NOTE — TELEPHONE ENCOUNTER
Verbal order Cezar Sharma CNP     Take bumex 2 mg daily x5 days then decrease to 1 mg daily.         Left message to call office

## 2021-08-04 NOTE — TELEPHONE ENCOUNTER
Spoke with patient  He decreased his bumex back to 0.5 mg because he did not want to run out of pills  Please advise

## 2021-08-05 RX ORDER — HYDRALAZINE HYDROCHLORIDE 50 MG/1
TABLET, FILM COATED ORAL
Qty: 540 TABLET | Refills: 2 | Status: SHIPPED | OUTPATIENT
Start: 2021-08-05 | End: 2021-08-17

## 2021-08-09 DIAGNOSIS — G89.4 CHRONIC PAIN SYNDROME: ICD-10-CM

## 2021-08-09 RX ORDER — OXYCODONE AND ACETAMINOPHEN 10; 325 MG/1; MG/1
1 TABLET ORAL EVERY 8 HOURS PRN
Qty: 90 TABLET | Refills: 0 | Status: SHIPPED | OUTPATIENT
Start: 2021-08-11 | End: 2021-09-08 | Stop reason: SDUPTHER

## 2021-08-09 RX ORDER — GABAPENTIN 300 MG/1
300 CAPSULE ORAL NIGHTLY
Qty: 30 CAPSULE | Refills: 0 | Status: SHIPPED | OUTPATIENT
Start: 2021-08-15 | End: 2021-09-08 | Stop reason: SDUPTHER

## 2021-08-12 ENCOUNTER — OFFICE VISIT (OUTPATIENT)
Dept: PULMONOLOGY | Age: 61
End: 2021-08-12
Payer: MEDICARE

## 2021-08-12 VITALS
HEART RATE: 97 BPM | DIASTOLIC BLOOD PRESSURE: 78 MMHG | OXYGEN SATURATION: 97 % | WEIGHT: 315 LBS | SYSTOLIC BLOOD PRESSURE: 120 MMHG | BODY MASS INDEX: 41.75 KG/M2 | HEIGHT: 73 IN | TEMPERATURE: 97.8 F

## 2021-08-12 DIAGNOSIS — J45.20 MILD INTERMITTENT ASTHMA WITHOUT COMPLICATION: Primary | ICD-10-CM

## 2021-08-12 DIAGNOSIS — I50.32 CHRONIC DIASTOLIC HEART FAILURE (HCC): ICD-10-CM

## 2021-08-12 DIAGNOSIS — R06.02 SHORTNESS OF BREATH: ICD-10-CM

## 2021-08-12 PROCEDURE — G8427 DOCREV CUR MEDS BY ELIG CLIN: HCPCS | Performed by: NURSE PRACTITIONER

## 2021-08-12 PROCEDURE — G8417 CALC BMI ABV UP PARAM F/U: HCPCS | Performed by: NURSE PRACTITIONER

## 2021-08-12 PROCEDURE — 94010 BREATHING CAPACITY TEST: CPT | Performed by: NURSE PRACTITIONER

## 2021-08-12 PROCEDURE — 99214 OFFICE O/P EST MOD 30 MIN: CPT | Performed by: NURSE PRACTITIONER

## 2021-08-12 PROCEDURE — 3017F COLORECTAL CA SCREEN DOC REV: CPT | Performed by: NURSE PRACTITIONER

## 2021-08-12 PROCEDURE — 4004F PT TOBACCO SCREEN RCVD TLK: CPT | Performed by: NURSE PRACTITIONER

## 2021-08-12 ASSESSMENT — ENCOUNTER SYMPTOMS
CHEST TIGHTNESS: 0
ALLERGIC/IMMUNOLOGIC NEGATIVE: 1
SHORTNESS OF BREATH: 1
NAUSEA: 0
DIARRHEA: 0
GASTROINTESTINAL NEGATIVE: 1
VOMITING: 0
EYES NEGATIVE: 1
WHEEZING: 0
STRIDOR: 0

## 2021-08-12 NOTE — PROGRESS NOTES
[facility]    Patient: Dalia Matthew, 64 y.o.   : 1960  2021      Subjective     Chief Complaint   Patient presents with    Follow-up     3 mo fu with med  check        HPI  Estefany Back is here for follow up for Asthma with medication check today on Breo 200-25 mcg. NIOX elevated last visit at 29- Flovent stopped and patient started on Breo- will repeat NIOX again today. On Singulair at night   CHF notes reviewed- Cardiomems showed increasing in fluid and Bumex increased to 2mg BID x 3 days. Covid vaccinations done x 2. Never a smoker. SOB a little worse now with inspiration- L>R- no CXR to review  Using Albuterol couple times a day- Stopped Breo (appt today was to see if this medication showed a decrease in previous airway inflammation)   Follows with Robert Le CNP at CHF clinic  LAURIE on BiPAP therapy- very compliant with controlled AHI  Weight 350lb today per CHF note this is ideal weight   No real pulmonary issues today       Progress History:   Since last visit any new medical issues? No  New ER or hospital visits? No  Any new or changes in medicines? No  Using inhalers? Yes   Are they helpful?  Yes   Past Medical hx   PMH:  Past Medical History:   Diagnosis Date    Arthritis     Back problem     CHF with unknown LVEF ([address] 2021    Constipation     Diabetes mellitus ([address]     Hypertension     Sleep apnea     has CPAP    Thyroid disease      SURGICAL HISTORY:  Past Surgical History:   Procedure Laterality Date    ABDOMEN SURGERY      APPENDECTOMY      CARDIAC CATHETERIZATION      no stents    CARPAL TUNNEL RELEASE Bilateral     COLONOSCOPY  2017    Dr. Neha Scott, [address]      x3 surgeries with 14 repairs    KNEE SURGERY Right     cartilage    DC EXPLORATORY OF ABDOMEN N/A 2018    ABDOMINAL EXPLORATION WITH LYSIS OF COMPLICATED ADHESIONS WITH AN EXTENDED RIGHT COLON RESECTION performed (NITROSTAT) 0.4 MG SL tablet Place 1 tablet under the tongue every 5 minutes as needed for Chest pain (up to 3 doses) 25 tablet 3    Fluticasone furoate-vilanterol (BREO ELLIPTA) 200-25 MCG/INH AEPB inhaler Inhale 1 puff into the lungs daily 3 each 3    rOPINIRole (REQUIP) 0.5 MG tablet Take 1 pill in afternoon and 2 pills at night. 90 tablet 2    amitriptyline (ELAVIL) 10 MG tablet Take 2 tablets by mouth nightly 60 tablet 5    RA ASPIRIN EC 81 MG EC tablet take 1 tablet by mouth once daily 90 tablet 0    oxybutynin (DITROPAN XL) 10 MG extended release tablet Take 1 tablet by mouth daily 90 tablet 3    tamsulosin (FLOMAX) 0.4 MG capsule Take 1 capsule by mouth nightly 90 capsule 3    sacubitril-valsartan (ENTRESTO) 24-26 MG per tablet Take 1 tablet by mouth 2 times daily 60 tablet 5    montelukast (SINGULAIR) 10 MG tablet Take 1 tablet by mouth daily 30 tablet 11    levothyroxine (SYNTHROID) 200 MCG tablet Take 1 tablet by mouth Daily 30 tablet 1    bismuth subsalicylate (PEPTO BISMOL) 262 MG/15ML suspension Take by mouth daily      glimepiride (AMARYL) 4 MG tablet Take 8 mg by mouth daily       carvedilol (COREG) 25 MG tablet Take 1 tablet by mouth 2 times daily 180 tablet 3    acetaminophen (TYLENOL) 500 MG tablet Take 1 tablet by mouth every 6 hours as needed for Pain 40 tablet 0    cyclobenzaprine (FLEXERIL) 10 MG tablet Take 10 mg by mouth 3 times daily as needed for Muscle spasms      albuterol (PROVENTIL HFA;VENTOLIN HFA) 108 (90 BASE) MCG/ACT inhaler Inhale 1 puff into the lungs every 6 hours as needed for Wheezing.  azelastine (ASTELIN) 137 MCG/SPRAY nasal spray 1 spray by Nasal route as needed. Use in each nostril as directed      potassium chloride (MICRO-K) 10 MEQ extended release capsule Take 2 capsules by mouth daily (Patient taking differently: Take 20 mEq by mouth nightly ) 60 capsule 0     No current facility-administered medications for this visit.         ROS   Review of Systems   Constitutional: Negative. Negative for chills, fever and unexpected weight change. HENT: Negative. Eyes: Negative. Respiratory: Positive for shortness of breath (with exertion and the hot weather). Negative for chest tightness, wheezing and stridor. Cardiovascular: Positive for chest pain (slight chest discomfort SHUKRI- ). Negative for leg swelling. Gastrointestinal: Negative. Negative for diarrhea, nausea and vomiting. Endocrine: Negative. Genitourinary: Negative. Negative for dysuria. Musculoskeletal: Positive for gait problem (uses cane). Skin: Negative. Allergic/Immunologic: Negative. Hematological: Negative. Psychiatric/Behavioral: Negative. Physical exam   /78 (Site: Left Upper Arm, Position: Sitting, Cuff Size: Large Adult)   Pulse 97   Temp 97.8 °F (36.6 °C)   Ht 6' 1\" (1.854 m)   Wt (!) 350 lb 3.2 oz (158.8 kg)   SpO2 97% Comment: on r/a  BMI 46.20 kg/m²    Wt Readings from Last 3 Encounters:   08/12/21 (!) 350 lb 3.2 oz (158.8 kg)   08/02/21 (!) 352 lb (159.7 kg)   07/23/21 (!) 352 lb (159.7 kg)       Physical Exam  Vitals and nursing note reviewed. Constitutional:       General: He is not in acute distress. Appearance: He is well-developed. He is obese. HENT:      Head: Normocephalic and atraumatic. Neck:      Trachea: No tracheal deviation. Cardiovascular:      Rate and Rhythm: Normal rate and regular rhythm. Heart sounds: Normal heart sounds. No murmur heard. Pulmonary:      Effort: Pulmonary effort is normal. No respiratory distress. Breath sounds: Normal breath sounds. No stridor. No wheezing or rales. Chest:      Chest wall: No tenderness. Abdominal:      General: Bowel sounds are normal. There is no distension. Palpations: Abdomen is soft. Skin:     General: Skin is warm and dry. Capillary Refill: Capillary refill takes less than 2 seconds.    Neurological:      Mental Status: He is oriented to person, place, and time. Gait: Gait abnormal.   Psychiatric:         Behavior: Behavior normal.         Thought Content: Thought content normal.         Judgment: Judgment normal.          results   Lung Nodule Screening     [] Qualifies    [x] Does not qualify   [] Declined    [] Completed  The USPSTF recommends annual screening for lung cancer with low-dose computed tomography (LDCT) in adults aged 48 to [de-identified] years who have a 30 pack-year smoking history and currently smoke or have quit within the past 20 years. Screening should be discontinued once a person has not smoked for 20 years or develops a health problem that substantially limits life expectancy or the ability or willingness to have curative lung surgery. 05/25/2021   ECHOCARDIOGRAM LIMITED. Conclusions      Summary   Left ventricle size is normal.   Moderate concentric left ventricular hypertrophy. There were no regional wall motion abnormalities. Ejection fraction is visually estimated in the range of 55% to 60%. Signature      ----------------------------------------------------------------   Electronically signed by Jens Sanabria MD (Interpreting   physician) on 05/25/2021 at 04:48 PM          Assessment      Diagnosis Orders   1. Mild intermittent asthma without complication  POCT Nitric Oxide   2. Shortness of breath      more with exertion     3.  Chronic diastolic heart failure ([address]           Plan   -NIOX today 39 (higher than last visit)- recommend patient USE Breo to help reduce inflammation in his airway and open lungs up for a longer period of time   -Albuterol PRN for SOB/wheezing  -Weight loss encouraged  -Will forward office note to Les Rodriguez CNP   -Does c/o some SHUKRI chest discomfort on inspiration offered CXR patient refused- breath sounds anteriorly and posteriorly WNL  -Advised to maintain pneumonia vaccine with PCP and to take flu vaccine this coming season.  -Advised patient to call office with any changes, questions, or concerns regarding respiratory status    Will see [facility] back in: 6 months    Marcus Abarca  8/12/2021

## 2021-08-16 ENCOUNTER — HOSPITAL ENCOUNTER (OUTPATIENT)
Age: 61
Discharge: HOME | End: 2021-08-16
Payer: MEDICARE

## 2021-08-16 DIAGNOSIS — I50.9 CHRONIC CONGESTIVE HEART FAILURE, UNSPECIFIED HEART FAILURE TYPE (HCC): ICD-10-CM

## 2021-08-16 PROCEDURE — 36415 COLL VENOUS BLD VENIPUNCTURE: CPT

## 2021-08-16 PROCEDURE — 80048 BASIC METABOLIC PNL TOTAL CA: CPT

## 2021-08-17 ENCOUNTER — OFFICE VISIT (OUTPATIENT)
Dept: SURGERY | Age: 61
End: 2021-08-17
Payer: MEDICARE

## 2021-08-17 VITALS
SYSTOLIC BLOOD PRESSURE: 108 MMHG | HEIGHT: 73 IN | WEIGHT: 315 LBS | BODY MASS INDEX: 41.75 KG/M2 | DIASTOLIC BLOOD PRESSURE: 64 MMHG | RESPIRATION RATE: 20 BRPM | HEART RATE: 97 BPM | OXYGEN SATURATION: 92 % | TEMPERATURE: 97.4 F

## 2021-08-17 DIAGNOSIS — Z51.89 VISIT FOR WOUND CHECK: Primary | ICD-10-CM

## 2021-08-17 DIAGNOSIS — T81.89XA NON-HEALING SURGICAL WOUND, INITIAL ENCOUNTER: ICD-10-CM

## 2021-08-17 DIAGNOSIS — L76.82 INCISIONAL PAIN: ICD-10-CM

## 2021-08-17 LAB
ANION GAP SERPL CALCULATED.3IONS-SCNC: 10 MEQ/L (ref 8–16)
BUN BLDV-MCNC: 14 MG/DL (ref 7–22)
CALCIUM SERPL-MCNC: 9.2 MG/DL (ref 8.5–10.5)
CHLORIDE BLD-SCNC: 99 MEQ/L (ref 98–111)
CO2: 26 MEQ/L (ref 23–33)
CREAT SERPL-MCNC: 1.3 MG/DL (ref 0.4–1.2)
GFR SERPL CREATININE-BSD FRML MDRD: 57 ML/MIN/1.73M2
GLUCOSE BLD-MCNC: 166 MG/DL (ref 70–108)
POTASSIUM SERPL-SCNC: 4.8 MEQ/L (ref 3.5–5.2)
SODIUM BLD-SCNC: 135 MEQ/L (ref 135–145)

## 2021-08-17 PROCEDURE — 4004F PT TOBACCO SCREEN RCVD TLK: CPT | Performed by: SURGERY

## 2021-08-17 PROCEDURE — 3017F COLORECTAL CA SCREEN DOC REV: CPT | Performed by: SURGERY

## 2021-08-17 PROCEDURE — 99213 OFFICE O/P EST LOW 20 MIN: CPT | Performed by: SURGERY

## 2021-08-17 PROCEDURE — G8427 DOCREV CUR MEDS BY ELIG CLIN: HCPCS | Performed by: SURGERY

## 2021-08-17 PROCEDURE — G8417 CALC BMI ABV UP PARAM F/U: HCPCS | Performed by: SURGERY

## 2021-08-17 RX ORDER — CLONIDINE HYDROCHLORIDE 0.2 MG/1
1 TABLET ORAL 3 TIMES DAILY
COMMUNITY
Start: 2021-08-05 | End: 2022-03-03 | Stop reason: ALTCHOICE

## 2021-08-17 NOTE — PROGRESS NOTES
[address] [place] [address] [address]  [facility] [address]  Dept: [phone]  Dept Fax: [phone]  Loc: [phone]    Visit Date: 8/17/2021    Emely Lance is a 64 y.o. male who presentstoday for:  Chief Complaint   Patient presents with    Surgical Consult     est pt last seen 2018 s/p colon resection-- states abd incision not healing       HPI:     HPI 82-year-old white male who had a complicated surgical history that did include an extended right colon resection and small bowel resection in 2018 performed for recurrent small bowel obstructions but also a history of severe chronic constipation patient prior to that operation had had ventral abdominal wall hernias repaired with mesh at the time of that surgery we did have to cut through mesh. Patient constipation actually reverted to significant loose stools but that is now straightening around but he rarely has any constipation is doing well from that standpoint however approximately 2 months ago he noticed what he describes as a plastic strand sticking through the wound and he continued to rub it till it fell off he then noted the wound had opened he has been having some drainage he is here for my evaluation patient unfortunately has significant medical issues including sleep apnea congestive heart failure is being followed in the congestive heart failure clinic he is here because the drainage has persisted    Past Medical History:   Diagnosis Date    Arthritis     Back problem     back pain-sees [facility] pain mgmt    Bladder disease     Dr. Dee Constantino CHF with unknown LVEF ([facility] [address] 05/24/2021    Dr. George/CHF clinic    Constipation     Diabetes mellitus ([facility] [address]     Dr. Bean Cadet Hypertension     Sleep apnea     has bipap-sees AZRA Mon CNP    Thyroid disease       Past Surgical History:   Procedure Laterality Date    ABDOMEN SURGERY      APPENDECTOMY      CARDIAC CATHETERIZATION no stents    CARPAL TUNNEL RELEASE Bilateral     COLONOSCOPY  2017    Dr. Valeria Munoz, ESOPHAGUS     [address]      x3 surgeries with 14 repairs    KNEE SURGERY Right 1980's    cartilage    NC EXPLORATORY OF ABDOMEN N/A 2/5/2018    ABDOMINAL EXPLORATION WITH LYSIS OF COMPLICATED ADHESIONS WITH AN EXTENDED RIGHT COLON RESECTION performed by Kenny Chua MD at [facility] History   Problem Relation Age of Onset    Cancer Mother         breast    Heart Disease Father         bladder, lung    Cancer Father     Stroke Brother     Heart Attack Brother     Prostate Cancer Brother     Diabetes Paternal Grandmother     Arthritis Brother     High Blood Pressure Neg Hx         Social History     Tobacco Use    Smoking status: Never Smoker    Smokeless tobacco: Current User     Types: Chew    Tobacco comment: quit pipe over 30 yrs ago   Substance Use Topics    Alcohol use: No     Alcohol/week: 0.0 standard drinks     Comment: quit          Current Outpatient Medications   Medication Sig Dispense Refill    cloNIDine (CATAPRES) 0.2 MG tablet Take 1 tablet by mouth 3 times daily      oxyCODONE-acetaminophen (PERCOCET)  MG per tablet Take 1 tablet by mouth every 8 hours as needed for Pain for up to 30 days. Intended supply: 30 days 90 tablet 0    gabapentin (NEURONTIN) 300 MG capsule Take 1 capsule by mouth nightly for 30 days.  30 capsule 0    bumetanide (BUMEX) 1 MG tablet Take 1 tablet by mouth daily AND as directed by CHF clinic 60 tablet 6    JANUVIA 100 MG tablet Take 1 tablet by mouth daily      amLODIPine (NORVASC) 10 MG tablet Take 1 tablet by mouth daily      spironolactone (ALDACTONE) 50 MG tablet Take 1 tablet by mouth 2 times daily 180 tablet 3    hydrALAZINE (APRESOLINE) 100 MG tablet Take 1 tablet by mouth every 8 hours 270 tablet 3    Fluticasone furoate-vilanterol (BREO ELLIPTA) 200-25 MCG/INH AEPB inhaler Inhale 1 puff into the lungs daily 3 each 3  rOPINIRole (REQUIP) 0.5 MG tablet Take 1 pill in afternoon and 2 pills at night. 90 tablet 2    amitriptyline (ELAVIL) 10 MG tablet Take 2 tablets by mouth nightly 60 tablet 5    RA ASPIRIN EC 81 MG EC tablet take 1 tablet by mouth once daily 90 tablet 0    oxybutynin (DITROPAN XL) 10 MG extended release tablet Take 1 tablet by mouth daily 90 tablet 3    tamsulosin (FLOMAX) 0.4 MG capsule Take 1 capsule by mouth nightly 90 capsule 3    sacubitril-valsartan (ENTRESTO) 24-26 MG per tablet Take 1 tablet by mouth 2 times daily 60 tablet 5    montelukast (SINGULAIR) 10 MG tablet Take 1 tablet by mouth daily 30 tablet 11    potassium chloride (MICRO-K) 10 MEQ extended release capsule Take 2 capsules by mouth daily (Patient taking differently: Take 20 mEq by mouth nightly ) 60 capsule 0    levothyroxine (SYNTHROID) 200 MCG tablet Take 1 tablet by mouth Daily 30 tablet 1    bismuth subsalicylate (PEPTO BISMOL) 262 MG/15ML suspension Take by mouth daily      glimepiride (AMARYL) 4 MG tablet Take 8 mg by mouth daily       carvedilol (COREG) 25 MG tablet Take 1 tablet by mouth 2 times daily 180 tablet 3    acetaminophen (TYLENOL) 500 MG tablet Take 1 tablet by mouth every 6 hours as needed for Pain 40 tablet 0    cyclobenzaprine (FLEXERIL) 10 MG tablet Take 10 mg by mouth 3 times daily as needed for Muscle spasms      albuterol (PROVENTIL HFA;VENTOLIN HFA) 108 (90 BASE) MCG/ACT inhaler Inhale 1 puff into the lungs every 6 hours as needed for Wheezing.  azelastine (ASTELIN) 137 MCG/SPRAY nasal spray 1 spray by Nasal route as needed. Use in each nostril as directed      nitroGLYCERIN (NITROSTAT) 0.4 MG SL tablet Place 1 tablet under the tongue every 5 minutes as needed for Chest pain (up to 3 doses) (Patient not taking: Reported on 8/17/2021) 25 tablet 3     No current facility-administered medications for this visit.      Allergies   Allergen Reactions    Shellfish-Derived Products Swelling     Tolerates IV dye without any problems      Pcn [Penicillins] Itching    Succinylcholine      Pseudocholinesterase Deficiency    Morphine Nausea And Vomiting     \"head swimming, skin crawling\"    Tape [Adhesive Tape] Rash       Subjective:      Review of Systems   Constitutional: Positive for fatigue. Negative for activity change, appetite change, chills, diaphoresis, fever and unexpected weight change. HENT: Negative for congestion, dental problem, drooling, ear discharge, ear pain, facial swelling, hearing loss, mouth sores, nosebleeds, postnasal drip, rhinorrhea, sinus pressure, sinus pain, sneezing, sore throat, tinnitus, trouble swallowing and voice change. Eyes: Positive for discharge. Negative for photophobia, pain, redness, itching and visual disturbance. Respiratory: Positive for shortness of breath. Negative for apnea, cough, choking, chest tightness, wheezing and stridor. Cardiovascular: Positive for leg swelling. Negative for chest pain and palpitations. Gastrointestinal: Positive for abdominal pain. Endocrine: Negative. Negative for cold intolerance, heat intolerance, polydipsia, polyphagia and polyuria. Genitourinary: Negative. Negative for decreased urine volume, difficulty urinating, discharge, dysuria, enuresis, flank pain, frequency, genital sores, hematuria, penile pain, penile swelling, scrotal swelling, testicular pain and urgency. Musculoskeletal: Positive for back pain. Negative for arthralgias, gait problem, joint swelling, myalgias, neck pain and neck stiffness. Skin: Negative for color change, pallor, rash and wound. Allergic/Immunologic: Positive for food allergies. Negative for environmental allergies and immunocompromised state. Neurological: Negative. Negative for dizziness, tremors, seizures, syncope, facial asymmetry, speech difficulty, weakness, light-headedness, numbness and headaches. Hematological: Negative. Negative for adenopathy.  Does not bruise/bleed easily. Psychiatric/Behavioral: Negative. Negative for agitation, behavioral problems, confusion, decreased concentration, dysphoric mood, hallucinations, self-injury, sleep disturbance and suicidal ideas. The patient is not nervous/anxious and is not hyperactive. Objective:   /64 (Site: Right Lower Arm, Position: Sitting, Cuff Size: Medium Adult)   Pulse 97   Temp 97.4 °F (36.3 °C) (Infrared)   Resp 20   Ht 6' 1\" (1.854 m)   Wt (!) 350 lb (158.8 kg)   SpO2 92%   BMI 46.18 kg/m²     Physical Exam  Constitutional:       Appearance: He is well-developed. HENT:      Head: Normocephalic and atraumatic. Eyes:      General: No scleral icterus. Left eye: No discharge. Conjunctiva/sclera: Conjunctivae normal.      Pupils: Pupils are equal, round, and reactive to light. Neck:      Thyroid: No thyromegaly. Trachea: No tracheal deviation. Cardiovascular:      Rate and Rhythm: Normal rate and regular rhythm. Heart sounds: Normal heart sounds. No murmur heard. No friction rub. No gallop. Pulmonary:      Effort: Pulmonary effort is normal. No respiratory distress. Breath sounds: Normal breath sounds. No wheezing or rales. Chest:      Chest wall: No tenderness. Abdominal:      Palpations: There is no mass. Hernia: No hernia is present. Musculoskeletal:         General: No tenderness. Normal range of motion. Lymphadenopathy:      Cervical: No cervical adenopathy. Skin:     General: Skin is warm and dry. Coloration: Skin is not pale. Findings: No erythema or rash. Neurological:      Mental Status: He is alert and oriented to person, place, and time. Psychiatric:         Behavior: Behavior normal.         Thought Content:  Thought content normal.         Judgment: Judgment normal.            Results for orders placed or performed during the hospital encounter of 23/62/31   Basic Metabolic Panel   Result Value Ref Range    Sodium 135 135 - 145 meq/L    Potassium 4.8 3.5 - 5.2 meq/L    Chloride 99 98 - 111 meq/L    CO2 26 23 - 33 meq/L    Glucose 166 (H) 70 - 108 mg/dL    BUN 14 7 - 22 mg/dL    CREATININE 1.3 (H) 0.4 - 1.2 mg/dL    Calcium 9.2 8.5 - 10.5 mg/dL   Anion Gap   Result Value Ref Range    Anion Gap 10.0 8.0 - 16.0 meq/L   Glomerular Filtration Rate, Estimated   Result Value Ref Range    Est, Glom Filt Rate 57 (A) ml/min/1.73m2       Assessment:     Small dehiscence of ventral abdominal wound and obvious concern would have to be for possible mesh infection however given patient's surgeries his medical issues I would like to try to treat this conservatively going in to remove the mesh I believe would be very difficult we will begin antibiotics for 10 days and then return to office in 2 weeks for recheck we did apply silver nitrate    Plan:     As above      Electronicallysigned by Tom Stark MD on 8/17/2021 at 3:45 PM

## 2021-08-18 ASSESSMENT — ENCOUNTER SYMPTOMS
VOICE CHANGE: 0
STRIDOR: 0
SINUS PAIN: 0
CHOKING: 0
COUGH: 0
SORE THROAT: 0
APNEA: 0
EYE ITCHING: 0
SINUS PRESSURE: 0
EYE REDNESS: 0
BACK PAIN: 1
COLOR CHANGE: 0
EYE PAIN: 0
WHEEZING: 0
EYE DISCHARGE: 1
SHORTNESS OF BREATH: 1
RHINORRHEA: 0
PHOTOPHOBIA: 0
FACIAL SWELLING: 0
TROUBLE SWALLOWING: 0
ABDOMINAL PAIN: 1
CHEST TIGHTNESS: 0

## 2021-08-20 DIAGNOSIS — I50.32 CHF (CONGESTIVE HEART FAILURE), NYHA CLASS II, CHRONIC, DIASTOLIC (HCC): Primary | ICD-10-CM

## 2021-08-20 PROCEDURE — 93264 REM MNTR WRLS P-ART PRS SNR: CPT | Performed by: NURSE PRACTITIONER

## 2021-08-31 ENCOUNTER — OFFICE VISIT (OUTPATIENT)
Dept: SURGERY | Age: 61
End: 2021-08-31
Payer: MEDICARE

## 2021-08-31 VITALS
OXYGEN SATURATION: 96 % | HEART RATE: 97 BPM | HEIGHT: 73 IN | WEIGHT: 315 LBS | BODY MASS INDEX: 41.75 KG/M2 | RESPIRATION RATE: 18 BRPM | DIASTOLIC BLOOD PRESSURE: 60 MMHG | TEMPERATURE: 96.7 F | SYSTOLIC BLOOD PRESSURE: 110 MMHG

## 2021-08-31 DIAGNOSIS — Z51.89 VISIT FOR WOUND CHECK: Primary | ICD-10-CM

## 2021-08-31 PROCEDURE — 99212 OFFICE O/P EST SF 10 MIN: CPT | Performed by: SURGERY

## 2021-08-31 PROCEDURE — 3017F COLORECTAL CA SCREEN DOC REV: CPT | Performed by: SURGERY

## 2021-08-31 PROCEDURE — 4004F PT TOBACCO SCREEN RCVD TLK: CPT | Performed by: SURGERY

## 2021-08-31 PROCEDURE — G8417 CALC BMI ABV UP PARAM F/U: HCPCS | Performed by: SURGERY

## 2021-08-31 PROCEDURE — G8427 DOCREV CUR MEDS BY ELIG CLIN: HCPCS | Performed by: SURGERY

## 2021-08-31 NOTE — PROGRESS NOTES
[address] [place] [address] [address] [facility] [address]  Dept: [phone]  Dept Fax: [phone]  Loc: [phone]    Visit Date: 8/31/2021    Tiara Jimenez is a 64 y.o. male who presentstoday for:  Chief Complaint   Patient presents with    Wound Check     s/p Abdominal exploration,Lysis of severe adhesions,Extended right colon resection-2/6/2018 Last seen in the office on 8/17/2021-small ventral abdominal wound opening-started Septra stopped on 8/27/2021 due to itching       HPI:     HPI as above patient has a small area of the incision that opened up and has been draining a minimal amount we elected to try some antibiotics he took them for a week but then developed itching is likely is has some sensitive to Septra and sulfa antibiotics we also applied silver nitrate last time he is here for wound check    Past Medical History:   Diagnosis Date    Arthritis     Back problem     back pain-sees [facility] pain mgmt    Bladder disease     Dr. Low Situ CHF with unknown LVEF ([facility] [address] 05/24/2021    Dr. George/CHF clinic    Constipation     Diabetes mellitus ([facility] [address]     Dr. Lenita Phoenix Hypertension     Sleep apnea     has bipap-sees AMarline Galvan CNP    Thyroid disease       Past Surgical History:   Procedure Laterality Date    ABDOMEN SURGERY      APPENDECTOMY      CARDIAC CATHETERIZATION      no stents    CARPAL TUNNEL RELEASE Bilateral     COLONOSCOPY  2017    Dr. Ángel Alves, [address]      x3 surgeries with 14 repairs    KNEE SURGERY Right 1980's    cartilage    RI EXPLORATORY OF ABDOMEN N/A 2/5/2018    ABDOMINAL EXPLORATION WITH LYSIS OF COMPLICATED ADHESIONS WITH AN EXTENDED RIGHT COLON RESECTION performed by Amarjit Martinez MD at [facility] History   Problem Relation Age of Onset    Cancer Mother         breast    Heart Disease Father         bladder, lung    Cancer Father     Stroke Brother opening draining cloudy fluid but no pus no cellulitis       Results for orders placed or performed during the hospital encounter of 74/47/31   Basic Metabolic Panel   Result Value Ref Range    Sodium 135 135 - 145 meq/L    Potassium 4.8 3.5 - 5.2 meq/L    Chloride 99 98 - 111 meq/L    CO2 26 23 - 33 meq/L    Glucose 166 (H) 70 - 108 mg/dL    BUN 14 7 - 22 mg/dL    CREATININE 1.3 (H) 0.4 - 1.2 mg/dL    Calcium 9.2 8.5 - 10.5 mg/dL   Anion Gap   Result Value Ref Range    Anion Gap 10.0 8.0 - 16.0 meq/L   Glomerular Filtration Rate, Estimated   Result Value Ref Range    Est, Glom Filt Rate 57 (A) ml/min/1.73m2       Assessment:     Given patient's known mesh that we had to cut through back in 5423 he certainly could have a mesh infection however to attempt to go in and remove the mesh would be a very very large operation and patient has some significant medical issues I feel at this point in time it is reasonable to continue to monitor he will have to keep a dressing on it obviously but we discussed with him the options and the likelihood of having to have and remove a lot of the mesh I believe the infection is so minimal that keeping a dressing on it unless it becomes more severe is the right thing to do at this time we did discuss the fact that I cannot explain why after 2-1/2 years the wound would begin draining again no significant cellulitis will follow we did apply little silver nitrate    Plan:     Return to office in 2 weeks      Electronicallysigned by Adrián Elliott MD on 8/31/2021 at 4:00 PM

## 2021-09-03 ENCOUNTER — HOSPITAL ENCOUNTER (EMERGENCY)
Age: 61
Discharge: HOME | End: 2021-09-03
Payer: MEDICARE

## 2021-09-03 VITALS
SYSTOLIC BLOOD PRESSURE: 135 MMHG | OXYGEN SATURATION: 98 % | RESPIRATION RATE: 16 BRPM | HEART RATE: 102 BPM | DIASTOLIC BLOOD PRESSURE: 78 MMHG | TEMPERATURE: 96.8 F

## 2021-09-03 DIAGNOSIS — S01.01XA LACERATION OF SCALP, INITIAL ENCOUNTER: Primary | ICD-10-CM

## 2021-09-03 DIAGNOSIS — S01.112A EYEBROW LACERATION, LEFT, INITIAL ENCOUNTER: ICD-10-CM

## 2021-09-03 PROCEDURE — 90471 IMMUNIZATION ADMIN: CPT | Performed by: NURSE PRACTITIONER

## 2021-09-03 PROCEDURE — 99213 OFFICE O/P EST LOW 20 MIN: CPT

## 2021-09-03 PROCEDURE — 6360000002 HC RX W HCPCS: Performed by: NURSE PRACTITIONER

## 2021-09-03 PROCEDURE — 90715 TDAP VACCINE 7 YRS/> IM: CPT | Performed by: NURSE PRACTITIONER

## 2021-09-03 PROCEDURE — 99213 OFFICE O/P EST LOW 20 MIN: CPT | Performed by: NURSE PRACTITIONER

## 2021-09-03 RX ADMIN — TETANUS TOXOID, REDUCED DIPHTHERIA TOXOID AND ACELLULAR PERTUSSIS VACCINE, ADSORBED 0.5 ML: 5; 2.5; 8; 8; 2.5 SUSPENSION INTRAMUSCULAR at 18:18

## 2021-09-03 ASSESSMENT — PAIN - FUNCTIONAL ASSESSMENT: PAIN_FUNCTIONAL_ASSESSMENT: PREVENTS OR INTERFERES SOME ACTIVE ACTIVITIES AND ADLS

## 2021-09-03 ASSESSMENT — PAIN DESCRIPTION - ORIENTATION: ORIENTATION: LEFT

## 2021-09-03 ASSESSMENT — PAIN DESCRIPTION - ONSET: ONSET: SUDDEN

## 2021-09-03 ASSESSMENT — PAIN DESCRIPTION - FREQUENCY: FREQUENCY: INTERMITTENT

## 2021-09-03 ASSESSMENT — PAIN DESCRIPTION - PROGRESSION: CLINICAL_PROGRESSION: NOT CHANGED

## 2021-09-03 ASSESSMENT — PAIN DESCRIPTION - PAIN TYPE: TYPE: ACUTE PAIN

## 2021-09-03 ASSESSMENT — ENCOUNTER SYMPTOMS
VOMITING: 0
NAUSEA: 0

## 2021-09-03 ASSESSMENT — PAIN DESCRIPTION - LOCATION: LOCATION: FACE;HEAD

## 2021-09-03 ASSESSMENT — PAIN DESCRIPTION - DESCRIPTORS: DESCRIPTORS: OTHER (COMMENT)

## 2021-09-03 ASSESSMENT — PAIN SCALES - GENERAL: PAINLEVEL_OUTOF10: 6

## 2021-09-03 NOTE — ED PROVIDER NOTES
[address]  Urgent Care Encounter       CHIEF COMPLAINT       Chief Complaint   Patient presents with    Head Injury     laceration to the left side of forehead, abrasion to the left brow       Nurses Notes reviewed and I agree except as noted in the HPI. HISTORY OF PRESENT ILLNESS   Cherelle Espino is a 64 y.o. male who presents with lacerations to the left scalp and left eyebrow. Patient was working on a small engine at his house when his hair got trapped in the rotating part of the engine and jerked his head down into the engine. Ripped out sizable chunk of hair out of the left temporal area and caused cuts there as well. Patient also broke his glasses which caused a laceration to the left eyebrow. He denies loss of consciousness. He denies headache other than pain right at the site. He remembers the entire incident. The history is provided by the patient. REVIEW OF SYSTEMS     Review of Systems   Constitutional: Negative for fever. Gastrointestinal: Negative for nausea and vomiting. Skin: Positive for wound. Neurological: Negative for dizziness, light-headedness, numbness and headaches. PAST MEDICAL HISTORY         Diagnosis Date    Arthritis     Back problem     back pain-sees [facility] pain mgmt    Bladder disease     Dr. Denton Nashua CHF with unknown LVEF ([facility] [address] 05/24/2021    Dr. George/CHF clinic    Constipation     Diabetes mellitus ([facility] [address]     Dr. Dusty Raphael Hypertension     Sleep apnea     has bipap-sees AZRA Olmedo CNP    Thyroid disease        SURGICALHISTORY     Patient  has a past surgical history that includes Appendectomy; knee surgery (Right, 1980's); Carpal tunnel release (Bilateral); Colonoscopy (2017); hernia repair; pr exploratory of abdomen (N/A, 2/5/2018); Dilatation, esophagus; Abdomen surgery; and Cardiac catheterization.     CURRENT MEDICATIONS       Current Discharge Medication List      CONTINUE these medications which have NOT CHANGED    Details per tablet Take 1 tablet by mouth 2 times daily  Qty: 60 tablet, Refills: 5      montelukast (SINGULAIR) 10 MG tablet Take 1 tablet by mouth daily  Qty: 30 tablet, Refills: 11    Associated Diagnoses: Mild intermittent asthma without complication; Wheezing      potassium chloride (MICRO-K) 10 MEQ extended release capsule Take 2 capsules by mouth daily  Qty: 60 capsule, Refills: 0      levothyroxine (SYNTHROID) 200 MCG tablet Take 1 tablet by mouth Daily  Qty: 30 tablet, Refills: 1      bismuth subsalicylate (PEPTO BISMOL) 262 MG/15ML suspension Take by mouth daily      glimepiride (AMARYL) 4 MG tablet Take 8 mg by mouth daily       carvedilol (COREG) 25 MG tablet Take 1 tablet by mouth 2 times daily  Qty: 180 tablet, Refills: 3      acetaminophen (TYLENOL) 500 MG tablet Take 1 tablet by mouth every 6 hours as needed for Pain  Qty: 40 tablet, Refills: 0      cyclobenzaprine (FLEXERIL) 10 MG tablet Take 10 mg by mouth 3 times daily as needed for Muscle spasms      albuterol (PROVENTIL HFA;VENTOLIN HFA) 108 (90 BASE) MCG/ACT inhaler Inhale 1 puff into the lungs every 6 hours as needed for Wheezing. azelastine (ASTELIN) 137 MCG/SPRAY nasal spray 1 spray by Nasal route as needed. Use in each nostril as directed             ALLERGIES     Patient is is allergic to shellfish-derived products, pcn [penicillins], succinylcholine, sulfa antibiotics, morphine, and tape [adhesive tape].     Patients   Immunization History   Administered Date(s) Administered    COVID-19, Pfizer, PF, 30mcg/0.3mL 03/18/2021, 04/08/2021    Influenza Virus Vaccine 09/15/2017, 10/12/2018    Influenza, MDCK Quadv, IM, PF (Flucelvax 2 yrs and older) 10/16/2020    Influenza, Quadv, IM, PF (6 mo and older Fluzone, Flulaval, Fluarix, and 3 yrs and older Afluria) 09/30/2016    Pneumococcal Polysaccharide ([id number]) 09/06/2014    Tdap (Boostrix, Adacel) 09/03/2021       FAMILY HISTORY     Patient's family history includes Arthritis in his brother; Cancer in his father and mother; Diabetes in his paternal grandmother; Heart Attack in his brother; Heart Disease in his father; Prostate Cancer in his brother; Stroke in his brother. SOCIAL HISTORY     Patient  reports that he has quit smoking. His smokeless tobacco use includes chew. He reports that he does not drink alcohol and does not use drugs. PHYSICAL EXAM     ED TRIAGE VITALS  BP: 135/78, Temp: 96.8 °F (36 °C), Pulse: 102, Resp: 16, SpO2: 98 %,Estimated body mass index is 45.75 kg/m² as calculated from the following:    Height as of 8/31/21: 6' 1\" (1.854 m). Weight as of 8/31/21: 346 lb 12.8 oz (157.3 kg). ,No LMP for male patient. Physical Exam  Vitals and nursing note reviewed. Constitutional:       General: He is not in acute distress. Appearance: He is well-developed. HENT:      Head: Normocephalic. Laceration (No left temporal measuring 2 cm. Laceration is superficial.  Second laceration just in front of the larger laceration which is superficial as well) present. Comments: Superficial laceration to the left eyebrow V shape. Edges are well approximated. Area of mild swelling to the left temporal area where hair was ripped out  Pulmonary:      Effort: Pulmonary effort is normal. No respiratory distress. Skin:     General: Skin is warm and dry. Neurological:      General: No focal deficit present. Mental Status: He is alert and oriented to person, place, and time. Psychiatric:         Mood and Affect: Mood normal.         Speech: Speech normal.         Behavior: Behavior normal. Behavior is cooperative. DIAGNOSTIC RESULTS     Labs:No results found for this visit on 09/03/21.     IMAGING:    No orders to display         EKG:      URGENT CARE COURSE:     Vitals:    09/03/21 1805   BP: 135/78   Pulse: 102   Resp: 16   Temp: 96.8 °F (36 °C)   TempSrc: Temporal   SpO2: 98%       Medications   Tetanus-Diphth-Acell Pertussis (BOOSTRIX) injection 0.5 mL (0.5 mLs IntraMUSCular Given 9/3/21 1818)            PROCEDURES:  None    FINAL IMPRESSION      1. Laceration of scalp, initial encounter    2. Eyebrow laceration, left, initial encounter          DISPOSITION/ PLAN     Patient presents with a laceration of the left temporal scalp as well as the left eyebrow. Lacerations are both superficial and do not need repair. Lacerations were cleansed thoroughly. Patient will treat with antibiotic ointment twice daily until scabbed over. Keep the wounds clean. Tetanus vaccine administered. Monitor for signs and symptoms of infection and follow-up with any concerns. Further instructions were outlined verbally and in the patient's discharge instructions. All the patient's questions were answered. The patient/parent agreed with the plan and was discharged from the [facility] in good condition.       PATIENT REFERRED TO:  DO Neha Cope [address] / [place] [address]      DISCHARGE MEDICATIONS:  Current Discharge Medication List          Current Discharge Medication List          Current Discharge Medication List          SAHIL Ramirez CNP    (Please note that portions of this note were completed with a voice recognition program. Efforts were made to edit the dictations but occasionally words are mis-transcribed.)         SAHIL Ramirez CNP  09/03/21 8466

## 2021-09-03 NOTE — ED NOTES
No adverse medication reaction noted with meds given. Discharge instructions discussed with pt and pt verbalized understanding of info given. Pt left stable and ambulated to exit.        Jasmin Morse RN  09/03/21 2844

## 2021-09-03 NOTE — ED NOTES
Pt here with head injury. Pt states that he was using an electric motor to make some wooden eggs and the blew his hair into the machine and wrapped his hair around pulling the hair out of the scalp. Pt was left with a superficial laceration to the scalp and a superficial laceration to the left eyebrow. Wounds were cleansed with wound wash.       Chandrika Jane RN  09/03/21 7212

## 2021-09-08 DIAGNOSIS — G89.4 CHRONIC PAIN SYNDROME: ICD-10-CM

## 2021-09-08 RX ORDER — GABAPENTIN 300 MG/1
300 CAPSULE ORAL NIGHTLY
Qty: 30 CAPSULE | Refills: 0 | Status: SHIPPED | OUTPATIENT
Start: 2021-09-12 | End: 2021-10-11 | Stop reason: SDUPTHER

## 2021-09-08 RX ORDER — BUMETANIDE 1 MG/1
1 TABLET ORAL DAILY
Qty: 60 TABLET | Refills: 6 | Status: ON HOLD | OUTPATIENT
Start: 2021-09-08 | End: 2022-03-30 | Stop reason: SDUPTHER

## 2021-09-08 RX ORDER — OXYCODONE AND ACETAMINOPHEN 10; 325 MG/1; MG/1
1 TABLET ORAL EVERY 8 HOURS PRN
Qty: 90 TABLET | Refills: 0 | Status: SHIPPED | OUTPATIENT
Start: 2021-09-10 | End: 2021-10-11 | Stop reason: SDUPTHER

## 2021-09-08 NOTE — TELEPHONE ENCOUNTER
OARRS reviewed. UDS: + for  Oxycodone gabapentin flexeril ambien consistent. Last seen: 8/2/2021.  Follow-up:   Future Appointments   Date Time Provider Ismael Haas   9/9/2021 11:30 AM SAHIL Moore - CNP N [facility] CHF [facility] [place]   9/14/2021  1:00 PM MD Asia Khalil Sandman Surg [facility] [place]   9/29/2021  2:00 PM Aroldo White PA-C N Pulm Med [facility] [place]   11/8/2021 12:00 PM SAHIL Ocampo - CNP N [facility] Pain [facility] [place]   1/13/2022  2:00 PM Elisa Lucero MD N SRPX Heart [facility] [place]   2/15/2022  2:00 PM Jasbir Hanson APRN - CNP [facility] [place]   4/12/2022 12:45 PM MD Annie Chowdhurywer Uro [facility] [place]   7/22/2022 11:20 AM Hoa Ron MD N [facility]. [facility] [place]

## 2021-09-09 ENCOUNTER — OFFICE VISIT (OUTPATIENT)
Dept: CARDIOLOGY CLINIC | Age: 61
End: 2021-09-09
Payer: MEDICARE

## 2021-09-09 VITALS
HEART RATE: 92 BPM | OXYGEN SATURATION: 97 % | SYSTOLIC BLOOD PRESSURE: 140 MMHG | BODY MASS INDEX: 41.75 KG/M2 | WEIGHT: 315 LBS | DIASTOLIC BLOOD PRESSURE: 80 MMHG | HEIGHT: 73 IN

## 2021-09-09 DIAGNOSIS — I10 ESSENTIAL HYPERTENSION: ICD-10-CM

## 2021-09-09 DIAGNOSIS — E66.01 OBESITY, CLASS III, BMI 40-49.9 (MORBID OBESITY) (HCC): ICD-10-CM

## 2021-09-09 DIAGNOSIS — G47.33 OSA (OBSTRUCTIVE SLEEP APNEA): ICD-10-CM

## 2021-09-09 DIAGNOSIS — I27.20 PULMONARY HTN (HCC): ICD-10-CM

## 2021-09-09 DIAGNOSIS — I50.32 CHF (CONGESTIVE HEART FAILURE), NYHA CLASS II, CHRONIC, DIASTOLIC (HCC): Primary | ICD-10-CM

## 2021-09-09 PROCEDURE — 99214 OFFICE O/P EST MOD 30 MIN: CPT | Performed by: NURSE PRACTITIONER

## 2021-09-09 PROCEDURE — 3017F COLORECTAL CA SCREEN DOC REV: CPT | Performed by: NURSE PRACTITIONER

## 2021-09-09 PROCEDURE — G8417 CALC BMI ABV UP PARAM F/U: HCPCS | Performed by: NURSE PRACTITIONER

## 2021-09-09 PROCEDURE — G8427 DOCREV CUR MEDS BY ELIG CLIN: HCPCS | Performed by: NURSE PRACTITIONER

## 2021-09-09 PROCEDURE — 4004F PT TOBACCO SCREEN RCVD TLK: CPT | Performed by: NURSE PRACTITIONER

## 2021-09-09 ASSESSMENT — ENCOUNTER SYMPTOMS
SHORTNESS OF BREATH: 1
ABDOMINAL DISTENTION: 0
COUGH: 0

## 2021-09-09 NOTE — PATIENT INSTRUCTIONS
You may receive a survey regarding the care you received during your visit. Your input is valuable to us. We encourage you to complete and return your survey. We hope you will choose us in the future for your healthcare needs.     Continue:  · Continue current medications  · Daily weights and record  · Fluid restriction of 2 Liters per day  · Limit sodium in diet to around 0152-1448 mg/day  · Monitor BP  · Activity as tolerated     Call the [facility] Failure [facility] for any of the following symptoms: [phone]   Weight gain of 2-3 pounds in 1 day or 5 pounds in 1 week   Increased shortness of breath   Shortness of breath while laying down   Cough   Chest pain   Swelling in feet, ankles or legs   Tenderness or bloating in the abdomen   Fatigue    Decreased appetite or nausea    Confusion       Get bloodwork in 3-4 weeks    Increase Entresto    Stop Potassium

## 2021-09-09 NOTE — PROGRESS NOTES
Heart Failure Clinic       Visit Date: 9/9/2021  Cardiologist:  Dr. Laith Amaya  Primary Care Physician: Dr. Abena Dumont, DO    Isaak Valdovinos is a 64 y.o. male who presents today for:  Chief Complaint   Patient presents with    3 Month Follow-Up     CHF       HPI:   Isaak Valdovinos is a 64 y.o. male who presents to the office for a follow up patient visit in the heart failure clinic. Accompanied by no one  Lives w/ wife  Raising 10 and 15 yr old grd dtrs    TYPE HF: HFrecoveredEF (40-45% - improved to 55-60%)  Device: no  HX: HTN, DM, LAURIE (CPAP), CKD Berta Andrei), Partial colectomy (2017), never smoker (wife does)  CardioMEMS implanted 6/16/21 - PAD goal 19-25mmHg  Reviewed - averaging 22-24 past month     Dry Wt:  350     Hospitalization:  March 2021 - CHF - SOB. + CXR for Pulm congestion.  BNP 1279.  BP meds adjusted.    12/1 = [facility] w/ Nallu = nonobstructive disease, elevated LVEDP, PCW 35mmHg - given Lasix 80 IV x 1  OV Pulmonary - 3/26 - PFTs = mild restriction likely 2/2 to obesity.   NIOX slightly elevated indicating inflammation - started on Flovent.   May 2021 - 361# on admission, 348# at d/c.  +CXR for congestion.  . Concerns today: Feeling stable relatively at baseline. Wts stable. Wishes he could do more. Is able to gia in garage. Walked from entrance, tolerates. Does cooking, some housework. Raising grd dtrs.    Issue w/ abd mesh (implanted 2018 for inguinal hernia) - piece was coming through skin, he broke it off, now has open area - seeing [facility]    Patient has:  Chest Pain: no  SOB: chronic AKINS  Orthopnea/PND: no - sleeps in bed  LAURIE: Bipap  Edema: no  Fatigue: same  Abdominal bloating: no  Cough: no  Appetite: good  Home weight: stable  Home blood pressure: 120/70s    Past Medical History:   Diagnosis Date    Arthritis     Back problem     back pain-sees [facility] pain mgmt    Bladder disease     Dr. Dorothea Claire CHF with unknown LVEF ([facility] [address] 05/24/2021    Dr. George/CHF clinic Take 1 tablet by mouth daily      amLODIPine (NORVASC) 10 MG tablet Take 1 tablet by mouth daily      spironolactone (ALDACTONE) 50 MG tablet Take 1 tablet by mouth 2 times daily 180 tablet 3    hydrALAZINE (APRESOLINE) 100 MG tablet Take 1 tablet by mouth every 8 hours 270 tablet 3    nitroGLYCERIN (NITROSTAT) 0.4 MG SL tablet Place 1 tablet under the tongue every 5 minutes as needed for Chest pain (up to 3 doses) 25 tablet 3    Fluticasone furoate-vilanterol (BREO ELLIPTA) 200-25 MCG/INH AEPB inhaler Inhale 1 puff into the lungs daily 3 each 3    rOPINIRole (REQUIP) 0.5 MG tablet Take 1 pill in afternoon and 2 pills at night. 90 tablet 2    amitriptyline (ELAVIL) 10 MG tablet Take 2 tablets by mouth nightly 60 tablet 5    RA ASPIRIN EC 81 MG EC tablet take 1 tablet by mouth once daily 90 tablet 0    oxybutynin (DITROPAN XL) 10 MG extended release tablet Take 1 tablet by mouth daily 90 tablet 3    tamsulosin (FLOMAX) 0.4 MG capsule Take 1 capsule by mouth nightly 90 capsule 3    montelukast (SINGULAIR) 10 MG tablet Take 1 tablet by mouth daily 30 tablet 11    levothyroxine (SYNTHROID) 200 MCG tablet Take 1 tablet by mouth Daily 30 tablet 1    bismuth subsalicylate (PEPTO BISMOL) 262 MG/15ML suspension Take by mouth daily      glimepiride (AMARYL) 4 MG tablet Take 8 mg by mouth daily       carvedilol (COREG) 25 MG tablet Take 1 tablet by mouth 2 times daily 180 tablet 3    acetaminophen (TYLENOL) 500 MG tablet Take 1 tablet by mouth every 6 hours as needed for Pain 40 tablet 0    cyclobenzaprine (FLEXERIL) 10 MG tablet Take 10 mg by mouth 3 times daily as needed for Muscle spasms      albuterol (PROVENTIL HFA;VENTOLIN HFA) 108 (90 BASE) MCG/ACT inhaler Inhale 1 puff into the lungs every 6 hours as needed for Wheezing.  azelastine (ASTELIN) 137 MCG/SPRAY nasal spray 1 spray by Nasal route as needed.  Use in each nostril as directed       No current facility-administered medications for this visit. Allergies   Allergen Reactions    Shellfish-Derived Products Swelling     Tolerates IV dye without any problems      Pcn [Penicillins] Itching    Succinylcholine      Pseudocholinesterase Deficiency    Sulfa Antibiotics Itching    Morphine Nausea And Vomiting     \"head swimming, skin crawling\"    Tape [Adhesive Tape] Rash       SUBJECTIVE:   Review of Systems   Constitutional: Positive for fatigue. Negative for activity change and appetite change. Respiratory: Positive for shortness of breath (AKINS). Negative for cough. Cardiovascular: Negative for chest pain, palpitations and leg swelling. Gastrointestinal: Negative for abdominal distention. Neurological: Negative for weakness, light-headedness and headaches. Hematological: Negative for adenopathy. Psychiatric/Behavioral: Negative for sleep disturbance. OBJECTIVE:   Today's Vitals:  BP (!) 140/80   Pulse 92   Ht 6' 1\" (1.854 m)   Wt (!) 350 lb 3.2 oz (158.8 kg)   SpO2 97%   BMI 46.20 kg/m²     Physical Exam  Vitals reviewed. Constitutional:       General: He is not in acute distress. Appearance: Normal appearance. He is well-developed. He is not diaphoretic. HENT:      Head: Normocephalic and atraumatic. Eyes:      Conjunctiva/sclera: Conjunctivae normal.   Neck:      Comments: No JVD  Cardiovascular:      Rate and Rhythm: Normal rate and regular rhythm. Heart sounds: Normal heart sounds. No murmur heard. Pulmonary:      Effort: Pulmonary effort is normal. No respiratory distress. Breath sounds: Normal breath sounds. No wheezing or rales. Abdominal:      General: Bowel sounds are normal. There is no distension. Palpations: Abdomen is soft. Tenderness: There is no abdominal tenderness. Musculoskeletal:         General: Normal range of motion. Cervical back: Normal range of motion and neck supple. Right lower leg: No edema. Left lower leg: No edema.    Skin:     General: Skin is warm and dry. Capillary Refill: Capillary refill takes less than 2 seconds. Neurological:      Mental Status: He is alert and oriented to person, place, and time. Coordination: Coordination normal.   Psychiatric:         Behavior: Behavior normal.         Wt Readings from Last 3 Encounters:   09/09/21 (!) 350 lb 3.2 oz (158.8 kg)   08/31/21 (!) 346 lb 12.8 oz (157.3 kg)   08/17/21 (!) 350 lb (158.8 kg)     BP Readings from Last 3 Encounters:   09/09/21 (!) 140/80   09/03/21 135/78   08/31/21 110/60     Pulse Readings from Last 3 Encounters:   09/09/21 92   09/03/21 102   08/31/21 97     Body mass index is 46.2 kg/m². ECHO:    Summary   Left ventricle size is normal.   Moderate concentric left ventricular hypertrophy.   There were no regional wall motion abnormalities.   Ejection fraction is visually estimated in the range of 55% to 60%.     Signature      ----------------------------------------------------------------   Electronically signed by Nayely Charles MD (Kelvin PRADO) on 05/25/2021 at 04:48 PM   ----------------------------------------------------------------     CATH/STRESS:   RIGHT HEART CATHETERIZATION:  1.  RA is 25.  2.  RV is 80/23, 27.  3.  PA is 82/44, 59.  4.  Wedge pressure is 35.  5.  LV is 188/28, 36.  6.  AO was 205/126, 160.  7.  AO saturation is 91%. 8.  PA saturation is 63%. 9.  Cardiac output by Cherelle method is 5.9. 10.  Cardiac index is 2.1.     CORONARY ANATOMY:  1.  Right coronary artery is a dominant vessel.  The vessel is quite  large and has mild luminal irregularities in the proximal, mid, and  distal portions of the vessel; otherwise, it is patent. 2.  Left main is patent, gives rise to LAD and left circumflex arteries. 3.  Left circumflex artery is patent without any significant disease.   4.  LAD is patent in the proximal portion, mid portion there is a 30% to  40% stenosis followed by mild luminal irregularities in the distal  portion of the LAD.  5.  LV gram was not performed due to renal dysfunction.     The patient tolerated the procedure well without any significant issues  or complications.  Hemostasis was achieved with a Vasc Band device.     SUMMARY:  1.  Elevated right heart pressures with elevated LVEPD. 2.  Patent coronaries.     RECOMMENDATIONS:  1.  IV diuresis. 2.  Monitor electrolytes. 3.  Optimize heart failure therapy. 4.  Weight loss advised. 5.  Optimize sleep apnea therapies. 6.  Follow up in clinic in one to two weeks to evaluate symptoms  further.        Katiana Wallis MD     D: 12/06/2020 21:57:38                           Results reviewed:  BNP: No results found for: BNP  CBC:   Lab Results   Component Value Date    WBC 9.0 06/16/2021    RBC 4.64 06/16/2021    RBC 5.15 08/12/2011    HGB 13.2 06/16/2021    HCT 41.9 06/16/2021     06/16/2021     CMP:    Lab Results   Component Value Date     08/16/2021    K 4.8 08/16/2021    K 4.0 05/27/2021    CL 99 08/16/2021    CO2 26 08/16/2021    BUN 14 08/16/2021    CREATININE 1.3 08/16/2021    LABGLOM 57 08/16/2021    GLUCOSE 166 08/16/2021    CALCIUM 9.2 08/16/2021     Hepatic Function Panel:    Lab Results   Component Value Date    ALKPHOS 76 04/15/2021    ALT 47 04/15/2021    AST 41 04/15/2021    PROT 8.3 04/15/2021    BILITOT 0.5 04/15/2021    BILIDIR <0.2 03/02/2021    LABALBU 4.4 04/15/2021     Magnesium:    Lab Results   Component Value Date    MG 2.1 07/21/2021     PT/INR:    Lab Results   Component Value Date    INR 1.14 06/16/2021     Lipids:    Lab Results   Component Value Date    TRIG 107 05/25/2021    HDL 35 05/25/2021    LDLCALC 96 05/25/2021       ASSESSMENT AND PLAN:   The patient's condition/symptoms are Stable: No clinical evidence of fluid overload today. Continue current medical regimen without changes at present time. Diagnosis Orders   1. CHF (congestive heart failure), NYHA class II, chronic, diastolic (HCC)  Basic Metabolic Panel   2.  Essential hypertension     3. Pulmonary HTN ([facility] [address]     4. LAURIE (obstructive sleep apnea)     5. Obesity, Class III, BMI 40-49.9 (morbid obesity) (HCC)       Continue:  GDMT:              ACE/ARB/ARNI - Entresto 24/26 BID - increase 49/51 BID              BB - Coreg 25 BID              Diuretic - Bumex 1mg/day, Kcl 20/day (stop Kcl)  AA - Aldactone 50 BID  Vasodilator - Hydralazine 100 TID  Continue Current medications: Norvasc 10/day, ASA? ?  CHF Diastolic NYHA II = stable  Will continue to optimize as able  BP slightly elevated  Increase Entresto to 49/51 - Pt assistance forms given   Stop Kcl (runs on high side)  Repeat BMP in 3-4 weeks  Continue diet/fluid adherence  Continue CardioMems transmissions  F/U Nallu in Jan  F/U in clinic in April    Tolerating above noted HF meds, no ill side effects noted. Will continue to monitor kidney function and electrolytes. Will optimize as tolerated. Pt is compliant w/ medications. Total visit time of 30 minutes has been spent with patient on education of symptoms, management, medication, and plan of care; as well as review of chart: labs, ECHO, radiology reports, etc.   I personally spent more then 50% of the appt time face to face with the patient. · Daily weights  · Fluid restriction of 2 Liters per day  · Limit sodium in diet to around 7097-1191 mg/day  · Monitor BP  · Activity as tolerated     Patient was instructed to call the [facility] for any changes in symptoms as noted in AVS.      Return in about 6 months (around 3/9/2022). or sooner if needed     Patient given educational materials - see patient instructions. We discussed the importance of weighing oneself and recording daily. We also discussed the importance of a low sodium diet, higher sodium foods to avoid and better low sodium food options. Patient verbalizes understanding of plan of care using teach back method, and is agreeable to the treatment plan.        Electronically signed by SAHIL Bucio - CNP on 9/9/2021 at 8:45 PM

## 2021-09-10 RX ORDER — ASPIRIN 81 MG/1
81 TABLET ORAL DAILY
Qty: 90 TABLET | Refills: 1 | COMMUNITY
Start: 2021-09-10

## 2021-09-14 ENCOUNTER — OFFICE VISIT (OUTPATIENT)
Dept: SURGERY | Age: 61
End: 2021-09-14
Payer: MEDICARE

## 2021-09-14 VITALS
OXYGEN SATURATION: 95 % | WEIGHT: 315 LBS | TEMPERATURE: 96.8 F | RESPIRATION RATE: 20 BRPM | DIASTOLIC BLOOD PRESSURE: 62 MMHG | HEART RATE: 57 BPM | BODY MASS INDEX: 41.75 KG/M2 | HEIGHT: 73 IN | SYSTOLIC BLOOD PRESSURE: 130 MMHG

## 2021-09-14 DIAGNOSIS — Z51.89 VISIT FOR WOUND CHECK: Primary | ICD-10-CM

## 2021-09-14 DIAGNOSIS — T81.89XA NON-HEALING SURGICAL WOUND, INITIAL ENCOUNTER: ICD-10-CM

## 2021-09-14 PROCEDURE — G8417 CALC BMI ABV UP PARAM F/U: HCPCS | Performed by: SURGERY

## 2021-09-14 PROCEDURE — 3017F COLORECTAL CA SCREEN DOC REV: CPT | Performed by: SURGERY

## 2021-09-14 PROCEDURE — 4004F PT TOBACCO SCREEN RCVD TLK: CPT | Performed by: SURGERY

## 2021-09-14 PROCEDURE — G8427 DOCREV CUR MEDS BY ELIG CLIN: HCPCS | Performed by: SURGERY

## 2021-09-14 PROCEDURE — 99212 OFFICE O/P EST SF 10 MIN: CPT | Performed by: SURGERY

## 2021-09-20 DIAGNOSIS — I50.32 CHF (CONGESTIVE HEART FAILURE), NYHA CLASS II, CHRONIC, DIASTOLIC (HCC): Primary | ICD-10-CM

## 2021-09-20 PROCEDURE — 93264 REM MNTR WRLS P-ART PRS SNR: CPT | Performed by: NURSE PRACTITIONER

## 2021-09-22 ENCOUNTER — HOSPITAL ENCOUNTER (OUTPATIENT)
Dept: WOUND CARE | Age: 61
Discharge: HOME | End: 2021-09-22
Payer: MEDICARE

## 2021-09-22 VITALS
DIASTOLIC BLOOD PRESSURE: 69 MMHG | OXYGEN SATURATION: 96 % | RESPIRATION RATE: 20 BRPM | SYSTOLIC BLOOD PRESSURE: 125 MMHG | HEIGHT: 73 IN | BODY MASS INDEX: 41.75 KG/M2 | HEART RATE: 88 BPM | WEIGHT: 315 LBS | TEMPERATURE: 97.1 F

## 2021-09-22 DIAGNOSIS — T81.89XA NON-HEALING SURGICAL WOUND, INITIAL ENCOUNTER: Primary | ICD-10-CM

## 2021-09-22 DIAGNOSIS — E11.9 TYPE 2 DIABETES MELLITUS WITHOUT COMPLICATION, WITHOUT LONG-TERM CURRENT USE OF INSULIN (HCC): ICD-10-CM

## 2021-09-22 PROBLEM — I16.1 HYPERTENSIVE EMERGENCY: Status: RESOLVED | Noted: 2019-04-11 | Resolved: 2021-09-22

## 2021-09-22 PROBLEM — I16.0 HYPERTENSIVE URGENCY: Status: RESOLVED | Noted: 2019-04-13 | Resolved: 2021-09-22

## 2021-09-22 PROCEDURE — 99213 OFFICE O/P EST LOW 20 MIN: CPT

## 2021-09-22 PROCEDURE — 99213 OFFICE O/P EST LOW 20 MIN: CPT | Performed by: NURSE PRACTITIONER

## 2021-09-22 RX ORDER — GINSENG 100 MG
CAPSULE ORAL ONCE
Status: CANCELLED | OUTPATIENT
Start: 2021-09-22 | End: 2021-09-22

## 2021-09-22 RX ORDER — BETAMETHASONE DIPROPIONATE 0.05 %
OINTMENT (GRAM) TOPICAL ONCE
Status: CANCELLED | OUTPATIENT
Start: 2021-09-22 | End: 2021-09-22

## 2021-09-22 RX ORDER — LIDOCAINE HYDROCHLORIDE 20 MG/ML
JELLY TOPICAL ONCE
Status: CANCELLED | OUTPATIENT
Start: 2021-09-22 | End: 2021-09-22

## 2021-09-22 RX ORDER — BACITRACIN, NEOMYCIN, POLYMYXIN B 400; 3.5; 5 [USP'U]/G; MG/G; [USP'U]/G
OINTMENT TOPICAL ONCE
Status: CANCELLED | OUTPATIENT
Start: 2021-09-22 | End: 2021-09-22

## 2021-09-22 RX ORDER — BACITRACIN ZINC AND POLYMYXIN B SULFATE 500; 1000 [USP'U]/G; [USP'U]/G
OINTMENT TOPICAL ONCE
Status: CANCELLED | OUTPATIENT
Start: 2021-09-22 | End: 2021-09-22

## 2021-09-22 RX ORDER — GENTAMICIN SULFATE 1 MG/G
OINTMENT TOPICAL ONCE
Status: CANCELLED | OUTPATIENT
Start: 2021-09-22 | End: 2021-09-22

## 2021-09-22 RX ORDER — IBUPROFEN 200 MG
400 TABLET ORAL EVERY 6 HOURS PRN
Status: ON HOLD | COMMUNITY
End: 2022-04-24 | Stop reason: HOSPADM

## 2021-09-22 RX ORDER — LIDOCAINE 40 MG/G
CREAM TOPICAL ONCE
Status: CANCELLED | OUTPATIENT
Start: 2021-09-22 | End: 2021-09-22

## 2021-09-22 RX ORDER — LIDOCAINE 50 MG/G
OINTMENT TOPICAL ONCE
Status: CANCELLED | OUTPATIENT
Start: 2021-09-22 | End: 2021-09-22

## 2021-09-22 RX ORDER — CLOBETASOL PROPIONATE 0.5 MG/G
OINTMENT TOPICAL ONCE
Status: CANCELLED | OUTPATIENT
Start: 2021-09-22 | End: 2021-09-22

## 2021-09-22 RX ORDER — LIDOCAINE HYDROCHLORIDE 40 MG/ML
SOLUTION TOPICAL ONCE
Status: CANCELLED | OUTPATIENT
Start: 2021-09-22 | End: 2021-09-22

## 2021-09-22 ASSESSMENT — PAIN SCALES - GENERAL: PAINLEVEL_OUTOF10: 6

## 2021-09-22 ASSESSMENT — PAIN DESCRIPTION - ORIENTATION: ORIENTATION: RIGHT;LEFT

## 2021-09-22 ASSESSMENT — PAIN DESCRIPTION - PAIN TYPE: TYPE: CHRONIC PAIN

## 2021-09-22 ASSESSMENT — PAIN DESCRIPTION - LOCATION: LOCATION: BACK;FOOT

## 2021-09-22 ASSESSMENT — PAIN DESCRIPTION - DESCRIPTORS: DESCRIPTORS: ACHING

## 2021-09-22 NOTE — PROGRESS NOTES
[address] f: [phone] f: [phone] p: [phone] [email or web address]      Ordering Center:     [address] [address]  [phone]  WOUND CARE Dept: [phone]     Fredy  [phone]    Patient Information:      Diane [address]  [address] [facility] [address]   [phone]   : 1960  AGE: 64 y.o. GENDER: male   EPISODE DATE: 2021    Insurance:      PRIMARY INSURANCE:  Plan: [employer] MEDICARE  Coverage: [employer] MEDICARE  Effective Date: 2018  Group Number: [unfilled]  Subscriber Number: [id number] - (Medicare Managed)    Payor/Plan Subscr  Sex Relation Sub. Ins. ID Effective Group Num   1. [address] A 1960 Male Self [id number]                                   [address]       Patient Wound Information:      Problem List Items Addressed This Visit     Nonhealing surgical wound    Type 2 diabetes mellitus, without long-term current use of insulin ([address]          WOUNDS REQUIRING DRESSING SUPPLIES:     Wound 21 Abdomen (Active)   Wound Image   21 1313   Wound Etiology Non-Healing Surgical 21 1313   Dressing Status Intact; New dressing applied 21 1313   Wound Cleansed Cleansed with saline 21 1313   Dressing/Treatment Packing;Gauze dressing/dressing sponge;ABD;Tape change 21 1313   Offloading for Diabetic Foot Ulcers No offloading required 21 1313   Wound Length (cm) 0.5 cm 21 1313   Wound Width (cm) 0.5 cm 21 1313   Wound Depth (cm) 0.7 cm 21 1313   Wound Surface Area (cm^2) 0.25 cm^2 21 1313   Wound Volume (cm^3) 0.175 cm^3 09/22/21 1313   Tunneling Position ___ O'Clock 12 21 1313   Undermining Starts ___ O'Clock 12 21 1313   Undermining Ends___ O'Clock 1.1 21 1313   Wound Assessment Granulation tissue 09/22/21 1313   Drainage Amount Large 09/22/21 1313   Drainage Description Sanguinous 09/22/21 1313   Odor None 09/22/21 1313   Zoë-wound Assessment Denuded;Blanchable erythema;Dry/flaky 09/22/21 1313   Margins Unattached edges 09/22/21 1313   Wound Thickness Description not for Pressure Injury Full thickness 09/22/21 1313   Number of days: 0          Supplies Requested :      WOUND #: 1   PRIMARY DRESSING:  Other: Mesalt ribbon   Cover and Secure with: 4X4 gauze pad  ABD pad     FREQUENCY OF DRESSING CHANGES:  Daily       ADDITIONAL ITEMS:  [] Gloves Small  [] Gloves Medium [x] Gloves Large [] Gloves XLarge  [] Tape 1\" [x] Tape 2\" [] Tape 3\"  [x] Medipore Tape  [x] Saline  [] Skin Prep   [x] Adhesive Remover   [x] Cotton Tip Applicators   [] Other:    Patient Wound(s) Debrided: [x] Yes 9/22/2021   [] No    Debribement Type: Mechanical     Patient currently being seen by Home Health: [] Yes   [x] No    Duration for needed supplies:  []15  []30  []60  [x]90 Days    Electronically signed by Miguel A Golden RN on 9/22/2021 at 1:57 PM     Provider Information:      Beckie Albright NAME: Leesa Owens CNP     NPI: [id number]

## 2021-09-22 NOTE — PLAN OF CARE
Problem: Wound:  Goal: Will show signs of wound healing; wound closure and no evidence of infection  Description: Will show signs of wound healing; wound closure and no evidence of infection  Outcome: Ongoing   Patient presents to wound clinic for evaluation and treatment of abdomen wound. Dressing supplies ordered through [facility]. Abdomen- Tuck a small amount of mesalt ribbon into wound using a cotton tipped applicator (cut into thin strip, 1/3 of the width of the rope), leaving a tail for easy removal. Apply zinc oxide cream (diaper rash cream) to the skin around the wound to protect it. Cover with gauze and ABD pad. Secure with tape. Change daily. May change outer gauze and ABD pad twice daily and as needed if increased drainage. Only need to change packing daily. Follow up visit: Wednesday October 13th at 3:30 pm.    Care plan reviewed with patient. Patient verbalize understanding of the plan of care and contribute to goal setting.

## 2021-09-22 NOTE — PROGRESS NOTES
[facility]  Consult and Procedure Note      Cherelle Espino  MEDICAL RECORD NUMBER:  [id number]  AGE: 64 y.o. GENDER: male  : 1960  EPISODE DATE:  2021    SUBJECTIVE:     Chief Complaint   Patient presents with    Wound Check     Abdomen         HISTORY OF PRESENT ILLNESS      Cherelle Espino is a 64 y.o. male who presents today for evaluation of nonhealing abdominal wound. Patient has history of abdominal exploration, lysis of adhesions and right colon resection 2018 that resulted in cutting through mesh placed previously with hernia repairs. Patient states that several months ago he developed plastic strand from prior incision line which is reported to have fallen off after rubbing at it repeatedly. Shortly after this patient noted wound with drainage. He was evaluated by Dr. Sukhdeep Bryant who discussed concerns for possible infection of mesh. He reportedly is a poor candidate for surgery due to complex medical history and extensive nature of surgery required for removal of mesh. Patient and Dr. Sukhdeep Bryant opted for conservative management at this time as patient notes minimal associated symptoms. He was given course of septra for drainage, discontinued prior to completion due to allergic reaction. Wound has been chemically cauterized previously per Dr. Sukhdeep Bryant. Patient has been cleaning with alcohol and hydrogen peroxide, applying neosporin and covering with bandaid. Patient notes large amounts of drainage from wound. He denies pain, stating that he has minimal sensation to area since surgery. He states bowels are at baseline. Denies nausea, vomiting, fever, chills, fatigue, malaise. Reports a good appetite. Denies any further needs or concerns.     PAST MEDICAL HISTORY             Diagnosis Date    Arthritis     Back problem     back pain-sees [facility] pain mgmt    Bladder disease     Dr. Denton Parker [facility] 2021    Dr. George/CHF clinic    Constipation     Diabetes mellitus ([facility]     Dr. Abbe Aguilar Hypertension     Hypertensive emergency 4/11/2019    Hypertensive urgency 4/13/2019    Sleep apnea     has bipap-sees AZRA Olmedo CNP    Thyroid disease        PAST SURGICAL HISTORY     Past Surgical History:   Procedure Laterality Date    ABDOMEN SURGERY      APPENDECTOMY      CARDIAC CATHETERIZATION      no stents    CARPAL TUNNEL RELEASE Bilateral     COLONOSCOPY  2017    Dr. Sierra Horse, ESOPHAGUS     [address]      x3 surgeries with 14 repairs    KNEE SURGERY Right 1980's    cartilage    IA EXPLORATORY OF ABDOMEN N/A 2/5/2018    ABDOMINAL EXPLORATION WITH LYSIS OF COMPLICATED ADHESIONS WITH AN EXTENDED RIGHT COLON RESECTION performed by Tom Stark MD at [address] [place] [address] [address]kw[address] HISTORY     Family History   Problem Relation Age of Onset    Cancer Mother         breast    Heart Disease Father         bladder, lung    Cancer Father     Stroke Brother     Heart Attack Brother     Prostate Cancer Brother     Diabetes Paternal Grandmother     Arthritis Brother     High Blood Pressure Neg Hx        SOCIAL HISTORY     Social History     Tobacco Use    Smoking status: Former Smoker    Smokeless tobacco: Current User     Types: Chew    Tobacco comment: quit pipe over 30 yrs ago   Vaping Use    Vaping Use: Never assessed   Substance Use Topics    Alcohol use: No     Alcohol/week: 0.0 standard drinks     Comment: quit    Drug use: No       ALLERGIES     Allergies   Allergen Reactions    Shellfish-Derived Products Swelling     Tolerates IV dye without any problems      Pcn [Penicillins] Itching    Succinylcholine      Pseudocholinesterase Deficiency    Sulfa Antibiotics Itching    Morphine Nausea And Vomiting     \"head swimming, skin crawling\"    Tape [Adhesive Tape] Rash       MEDICATIONS     Current Outpatient Medications on File Prior to Encounter   Medication Sig Dispense Refill    ibuprofen (ADVIL;MOTRIN) 200 MG tablet Take 400 mg by mouth every 6 hours as needed for Pain      aspirin EC 81 MG EC tablet Take 1 tablet by mouth daily 90 tablet 1    sacubitril-valsartan (ENTRESTO) 49-51 MG per tablet Take 1 tablet by mouth 2 times daily 60 tablet 11    bumetanide (BUMEX) 1 MG tablet Take 1 tablet by mouth daily AND 1 tablet as needed. 60 tablet 6    oxyCODONE-acetaminophen (PERCOCET)  MG per tablet Take 1 tablet by mouth every 8 hours as needed for Pain for up to 30 days. Intended supply: 30 days 90 tablet 0    gabapentin (NEURONTIN) 300 MG capsule Take 1 capsule by mouth nightly for 30 days. 30 capsule 0    cloNIDine (CATAPRES) 0.2 MG tablet Take 1 tablet by mouth 3 times daily      JANUVIA 100 MG tablet Take 1 tablet by mouth daily      amLODIPine (NORVASC) 10 MG tablet Take 1 tablet by mouth daily      spironolactone (ALDACTONE) 50 MG tablet Take 1 tablet by mouth 2 times daily 180 tablet 3    hydrALAZINE (APRESOLINE) 100 MG tablet Take 1 tablet by mouth every 8 hours 270 tablet 3    Fluticasone furoate-vilanterol (BREO ELLIPTA) 200-25 MCG/INH AEPB inhaler Inhale 1 puff into the lungs daily 3 each 3    rOPINIRole (REQUIP) 0.5 MG tablet Take 1 pill in afternoon and 2 pills at night.  90 tablet 2    amitriptyline (ELAVIL) 10 MG tablet Take 2 tablets by mouth nightly 60 tablet 5    oxybutynin (DITROPAN XL) 10 MG extended release tablet Take 1 tablet by mouth daily 90 tablet 3    tamsulosin (FLOMAX) 0.4 MG capsule Take 1 capsule by mouth nightly 90 capsule 3    montelukast (SINGULAIR) 10 MG tablet Take 1 tablet by mouth daily 30 tablet 11    levothyroxine (SYNTHROID) 200 MCG tablet Take 1 tablet by mouth Daily 30 tablet 1    bismuth subsalicylate (PEPTO BISMOL) 262 MG/15ML suspension Take by mouth daily      glimepiride (AMARYL) 4 MG tablet Take 8 mg by mouth daily       carvedilol (COREG) 25 MG tablet Take 1 tablet by mouth 2 times daily 180 tablet 3    cyclobenzaprine (FLEXERIL) 10 MG tablet Take 10 mg by mouth 3 times daily as needed for Muscle spasms      azelastine (ASTELIN) 137 MCG/SPRAY nasal spray 1 spray by Nasal route as needed. Use in each nostril as directed      nitroGLYCERIN (NITROSTAT) 0.4 MG SL tablet Place 1 tablet under the tongue every 5 minutes as needed for Chest pain (up to 3 doses) 25 tablet 3    acetaminophen (TYLENOL) 500 MG tablet Take 1 tablet by mouth every 6 hours as needed for Pain 40 tablet 0    albuterol (PROVENTIL HFA;VENTOLIN HFA) 108 (90 BASE) MCG/ACT inhaler Inhale 1 puff into the lungs every 6 hours as needed for Wheezing. No current facility-administered medications on file prior to encounter. REVIEW OF SYSTEMS:     Constitutional: Denies fever, chills, night sweats, fatigue, weight loss/gain, loss of appetite   Head: Denies headache,  dizziness, loss of consciousness  Mouth/throat: Denies ulceration, dental caries , dysphagia  Respiratory: Denies shortness of breath, cough, wheezing, dyspnea with exertion  Cardiovascular:Denies chest pain, palpitations, edema  Gastrointestinal: Denies nausea, vomiting, constipation, diarrhea, abdominal pain   ZHENG: Denies dysuria, frequency, urgency, hematuria  Musculoskeletal: Denies joint pain, swelling , stiffness,  Endocrine: Denies polyuria, polydipsia, cold or heat intolerance  Hematology: Denies easy brusing or bleeding, hx of clotting disorder  Dermatology: +wound enies skin rash, eczema, pruritis    PHYSICAL EXAM:     /69   Pulse 88   Temp 97.1 °F (36.2 °C) (Infrared)   Resp 20   Ht 6' 1\" (1.854 m)   Wt (!) 346 lb (156.9 kg)   SpO2 96%   BMI 45.65 kg/m²   Wt Readings from Last 3 Encounters:   09/22/21 (!) 346 lb (156.9 kg)   09/14/21 (!) 346 lb (156.9 kg)   09/09/21 (!) 350 lb 3.2 oz (158.8 kg)       General:  Awake, alert, no apparent distress. Appears stated age  [de-identified]: conjuctivae are clear without exudate or hemorrhage, anicteric sclera, moist oral mucosa.   Chest:  Respirations regular, non-labored. Chest rise and fall equal bilaterally. Abdomen:  Soft, non tender to palpation, obese, surgical scarring  Neurological: Awake, alert, oriented x4  Psychiatric:  Appropriate mood and affect  Extremities: non-traumatic in appearance. Skin:  Warm and dry  Wound:    Midline abdomen wound bed granular with large amounts of serosanguinous drainage. Periwound skin is denuded with blanchable erythema, induration. No warmth noted. Wound 09/22/21 Abdomen (Active)   Wound Image   09/22/21 1313   Wound Etiology Non-Healing Surgical 09/22/21 1313   Dressing Status Intact; New dressing applied 09/22/21 1313   Wound Cleansed Cleansed with saline 09/22/21 1313   Dressing/Treatment Packing;Gauze dressing/dressing sponge;ABD;Tape change 09/22/21 1313   Offloading for Diabetic Foot Ulcers No offloading required 09/22/21 1313   Wound Length (cm) 0.5 cm 09/22/21 1313   Wound Width (cm) 0.5 cm 09/22/21 1313   Wound Depth (cm) 0.7 cm 09/22/21 1313   Wound Surface Area (cm^2) 0.25 cm^2 09/22/21 1313   Wound Volume (cm^3) 0.175 cm^3 09/22/21 1313   Tunneling Position ___ O'Clock 12 09/22/21 1313   Undermining Starts ___ O'Clock 12 09/22/21 1313   Undermining Ends___ O'Clock 1.1 09/22/21 1313   Wound Assessment Granulation tissue 09/22/21 1313   Drainage Amount Large 09/22/21 1313   Drainage Description Sanguinous 09/22/21 1313   Odor None 09/22/21 1313   Zoë-wound Assessment Denuded;Blanchable erythema;Dry/flaky 09/22/21 1313   Margins Unattached edges 09/22/21 1313   Wound Thickness Description not for Pressure Injury Full thickness 09/22/21 1313   Number of days: 0         LABS/IMAGING     UA: No results for input(s): SPECGRAV, PHUR, COLORU, CLARITYU, MUCUS, PROTEINU, BLOODU, RBCUA, WBCUA, BACTERIA, NITRU, GLUCOSEU, BILIRUBINUR, UROBILINOGEN, KETUA, LABCAST, LABCASTTY, AMORPHOS in the last 72 hours.     Invalid input(s): CRYSTALS  Micro:   Lab Results   Component Value Date    BC No growth-preliminary No growth  03/02/2021     ASSESSMENT     -Nonhealing surgical wound    Ulcer Identification:  Ulcer Type: non-healing surgical    Depth of Diabetic/Pressure/Non Pressure Ulcers or Wound:  Wound, full thickness     Patient Active Problem List   Diagnosis Code    Allergy to bee sting Z91.030    Obesity, Class III, BMI 40-49.9 (morbid obesity) ([facility]) E66.01    Weight gain R63.5    Essential hypertension I10    Rheumatoid arteritis ([facility]) M05.20    Hypothyroidism E03.9    Gastroenteritis K52.9    Obstructive sleep apnea on CPAP G47.33, Z99.89    Small intestinal bacterial overgrowth K63.89    Acute diarrhea R19.7    Generalized abdominal pain R10.84    Nausea and vomiting R11.2    Hepatomegaly R16.0    Ileus ([facility]) K56.7    Hypokalemia E87.6    S/P partial resection of colon Z90.49    S/P laparotomy Z98.890    Acute kidney failure with tubular necrosis ([facility]) N17.0    Hypernatremia E87.0    Serum potassium decreased E87.6    Other headache syndrome G44.89    RLS (restless legs syndrome) G25.81    Psychophysiological insomnia F51.04    Angina, class III ([facility]) I20.9    Acute respiratory failure with hypoxia ([facility]) J96.01    Dyspnea R06.00    CHF (NYHA class III, ACC/AHA stage C) ([facility]) I50.9    Nonhealing surgical wound T81.89XA    Type 2 diabetes mellitus, without long-term current use of insulin ([facility] E11.9       PLAN     Patient examined and evaluated. All available lab work, radiology studies, and progress notes pertaining to [facility] reviewed prior to or during patient visit today.    -Nonhealing surgical wound, abdomen- While goal of wound is healing, discussed with patient that wound may not heal with local wound care if mesh is involved. Periwound erythematous, irritated today, most likely due to excess moisture and irritation from cleaning agents. Recommend discontinuing use of hydrogen peroxide, alcohol and antibiotic ointments. Start wound care orders as follows:   Abdomen- Tuck a small amount of mesalt ribbon into wound using a cotton tipped applicator (cut into thin strip, 1/3 of the width of the rope), leaving a tail for easy removal. Apply zinc oxide cream (diaper rash cream) to the skin around the wound to protect it. Cover with gauze and ABD pad. Secure with tape. -Wound does not appear to be infected at today's visit, periwound erythema appears to be related to moisture and irritation as noted above. Will re-evaluate in 2 weeks, if erythema continues will culture wound to evaluate for infection. Chlorhexidine gluconate wash provided to patient, wash 2-3 times weekly. Extensive education provided on signs and symptoms of infection. Advised patient to call clinic or seek emergency care should these occur. Follow up 2 weeks. Call clinic with any needs or concerns prior to scheduled visit. All questions and concerns addressed prior to discharge from today's visit. Please see attached Discharge Instructions    Written patient dismissal instructions given to patient and signed by patient or POA. Discharge Instructions         Discharge Instructions       Visit Discharge/Physician Orders:  -Stop cleaning wound with alcohol and hydrogen peroxide.  -Hair around wound clipped today. -Dressing supplies ordered through [facility], they will deliver to your home within the next couple days. If you do not receive these by then please call.  -You can get diaper rash cream over the counter.  -Okay to shower and cleanse with Tri-Hex soap a few times a week to decrease bacteria. Wound Location: Abdomen    Dressing orders:     1) Gather wound care supplies and arrange on clean table. 2) Wash your hands with soap and water or use alcohol based hand  for 20 seconds (sing \"Happy Birthday\" twice). 3) Cleanse wounds with normal saline or wound cleanser and gauze. Pat dry with clean gauze.     4) Abdomen- Tuck a small amount of mesalt ribbon into wound using a cotton tipped applicator (cut into thin strip, 1/3 of the width of the rope), leaving a tail for easy removal. Apply zinc oxide cream (diaper rash cream) to the skin around the wound to protect it. Cover with gauze and ABD pad. Secure with tape. Change daily. May change outer gauze and ABD pad twice daily and as needed if increased drainage. Only need to change packing daily. Keep all dressings clean & dry. Follow up visit: Wednesday October 13th at 3:30 pm    Keep next scheduled appointment. Please give 24 hour notice if unable to keep appointment. [phone]    If you experience any of the following, please call the Wound Care Service during business hours: Monday through Friday 8:00 am - 4:30 pm  ([phone]). *Increase in pain   *Temperature over 101   *Increase in drainage from your wound or a foul odor   *Uncontrolled swelling   *Need for compression bandage changes due to slippage, breakthrough drainage    If you need medical attention outside of business hours, please contact your Primary Care Doctor or go to the nearest emergency room.                    Electronically signed by SAHIL Estrada CNP on 9/22/2021 at 2:02 PM

## 2021-09-29 ENCOUNTER — OFFICE VISIT (OUTPATIENT)
Dept: PULMONOLOGY | Age: 61
End: 2021-09-29
Payer: MEDICARE

## 2021-09-29 VITALS
WEIGHT: 315 LBS | OXYGEN SATURATION: 96 % | HEART RATE: 98 BPM | DIASTOLIC BLOOD PRESSURE: 80 MMHG | TEMPERATURE: 97.8 F | SYSTOLIC BLOOD PRESSURE: 136 MMHG | BODY MASS INDEX: 41.75 KG/M2 | HEIGHT: 73 IN

## 2021-09-29 DIAGNOSIS — G47.33 OSA TREATED WITH BIPAP: Primary | ICD-10-CM

## 2021-09-29 DIAGNOSIS — E66.01 MORBID OBESITY WITH BMI OF 45.0-49.9, ADULT (HCC): ICD-10-CM

## 2021-09-29 DIAGNOSIS — G47.09 OTHER INSOMNIA: ICD-10-CM

## 2021-09-29 PROCEDURE — G8427 DOCREV CUR MEDS BY ELIG CLIN: HCPCS | Performed by: PHYSICIAN ASSISTANT

## 2021-09-29 PROCEDURE — G8417 CALC BMI ABV UP PARAM F/U: HCPCS | Performed by: PHYSICIAN ASSISTANT

## 2021-09-29 PROCEDURE — 3017F COLORECTAL CA SCREEN DOC REV: CPT | Performed by: PHYSICIAN ASSISTANT

## 2021-09-29 PROCEDURE — 99214 OFFICE O/P EST MOD 30 MIN: CPT | Performed by: PHYSICIAN ASSISTANT

## 2021-09-29 PROCEDURE — 4004F PT TOBACCO SCREEN RCVD TLK: CPT | Performed by: PHYSICIAN ASSISTANT

## 2021-09-29 RX ORDER — ROPINIROLE 1 MG/1
TABLET, FILM COATED ORAL
Qty: 180 TABLET | Refills: 1 | Status: SHIPPED | OUTPATIENT
Start: 2021-09-29 | End: 2021-12-28 | Stop reason: SDUPTHER

## 2021-09-29 RX ORDER — DOXEPIN HYDROCHLORIDE 25 MG/1
25 CAPSULE ORAL NIGHTLY
Qty: 30 CAPSULE | Refills: 3 | Status: SHIPPED | OUTPATIENT
Start: 2021-09-29 | End: 2021-12-28 | Stop reason: SDUPTHER

## 2021-09-29 ASSESSMENT — ENCOUNTER SYMPTOMS
DIARRHEA: 0
SHORTNESS OF BREATH: 0
COUGH: 0
CHEST TIGHTNESS: 0
ALLERGIC/IMMUNOLOGIC NEGATIVE: 1
NAUSEA: 0
EYES NEGATIVE: 1
BACK PAIN: 0
STRIDOR: 0
WHEEZING: 0

## 2021-09-29 NOTE — PROGRESS NOTES
[facility]      Monica Robertson         [id number]  9/29/2021   Chief Complaint   Patient presents with    Follow-up     LAURIE 4 month follow up with download         Pt of Phan CANTU Download:   Original or initial AHI: 49.0     Date of initial study: 9/12/2015      Compliant  93%     Noncompliant 7 %     PAP Type Aircurve 10 Vauto Level  18/14   Avg Hrs/Day 8 hr 13 min  AHI: 0.3   Recorded compliance dates , 8/29/2021  to 9/27/2021   Machine/Mfg:   [x] ResMed    [] Respironics/Dreamstation   Interface:   [] Nasal    [] Nasal pillows   [x] FFM      Provider:      [x] [facility]     [][facility]     [] [employer]    [] Perla Loose    [] [facility]               [] [facility]      [] [employer]    [] [address]      [] Other    Neck Size: 20.75  Mallampati Mallampati 4  ESS: 6   SAQLI: 35    Here is a scan of the most recent download:              Presentation:   Timur Chun presents for sleep medicine follow up for obstructive sleep apnea, insomnia  Since the last visit, Timur Chun is doing ok with PAP. Pain still limits his sleep. He is not sleeping well. He stopped the Elavil, not helpful. He is on gabapentin and Requip. He is having some RLS symptom in the evening. Equipment issues: The pressure is  acceptable, the mask is acceptable     Sleep issues:  Do you feel better? Yes and No  More rested? Sometimes   Better concentration? yes    Progress History:   Since last visit any new medical issues? No  New ER or hospital visits? No  Any new or changes in medicines? No  Any new sleep medicines? No    Review of Systems -   Review of Systems   Constitutional: Negative for activity change, appetite change, chills and fever. HENT: Negative for congestion and postnasal drip. Eyes: Negative. Respiratory: Negative for cough, chest tightness, shortness of breath, wheezing and stridor. Cardiovascular: Negative for chest pain and leg swelling.    Gastrointestinal: Negative for diarrhea and nausea. Endocrine: Negative. Genitourinary: Negative. Musculoskeletal: Negative. Negative for arthralgias and back pain. Skin: Negative. Allergic/Immunologic: Negative. Neurological: Negative. Negative for dizziness and light-headedness. Psychiatric/Behavioral: Negative. All other systems reviewed and are negative. Physical Exam:    BMI:  Body mass index is 45.91 kg/m². Wt Readings from Last 3 Encounters:   09/29/21 (!) 348 lb (157.9 kg)   09/22/21 (!) 346 lb (156.9 kg)   09/14/21 (!) 346 lb (156.9 kg)     Weight stable / unchanged  Vitals: /80 (Site: Left Upper Arm, Position: Sitting, Cuff Size: Large Adult)   Pulse 98   Temp 97.8 °F (36.6 °C) (Temporal)   Ht 6' 1\" (1.854 m)   Wt (!) 348 lb (157.9 kg)   SpO2 96%   BMI 45.91 kg/m²       Physical Exam  Constitutional:       Appearance: He is well-developed. HENT:      Head: Normocephalic and atraumatic. Right Ear: External ear normal.      Left Ear: External ear normal.   Eyes:      Conjunctiva/sclera: Conjunctivae normal.      Pupils: Pupils are equal, round, and reactive to light. Cardiovascular:      Rate and Rhythm: Normal rate and regular rhythm. Heart sounds: Normal heart sounds. Pulmonary:      Effort: Pulmonary effort is normal.      Breath sounds: Normal breath sounds. Musculoskeletal:         General: Normal range of motion. Cervical back: Normal range of motion and neck supple. Skin:     General: Skin is warm and dry. Neurological:      Mental Status: He is alert and oriented to person, place, and time. Psychiatric:         Behavior: Behavior normal.         Thought Content: Thought content normal.         Judgment: Judgment normal.           ASSESSMENT/DIAGNOSIS     Diagnosis Orders   1. LAURIE treated with BiPAP     2. Other insomnia     3. Morbid obesity with BMI of 45.0-49.9, adult [facility])              Plan   Do you need any equipment today?  No  - Will increase Requip to 1mg in afternoon and 1 mg at bedtime  - Will try Doxepin for insomnia  - Insomnia secondary to pain- following with pain management   - Download reviewed and discussed with patient  - He  was advised to continue current positive airway pressure therapy with above described pressure. - He  advised to keep good compliance with current recommended pressure to get optimal results and clinical improvement  - Recommend 7-9 hours of sleep with PAP  - He was advised to call [employer] regarding supplies if needed.   -He call my office for earlier appointment if needed for worsening of sleep symptoms.   - He was instructed on weight loss  - Nesha Del Toro was educated about my impression and plan. Patient verbalizesunderstanding.   We will see Elena Zepeda back in: 3 months with download    Information added by my medical assistant/LPN was reviewed today         Celio Naqvi PA-C, MPAS  9/29/2021

## 2021-10-10 ENCOUNTER — PATIENT MESSAGE (OUTPATIENT)
Dept: PHYSICAL MEDICINE AND REHAB | Age: 61
End: 2021-10-10

## 2021-10-10 DIAGNOSIS — G89.4 CHRONIC PAIN SYNDROME: ICD-10-CM

## 2021-10-11 RX ORDER — OXYCODONE AND ACETAMINOPHEN 10; 325 MG/1; MG/1
1 TABLET ORAL EVERY 8 HOURS PRN
Qty: 90 TABLET | Refills: 0 | Status: SHIPPED | OUTPATIENT
Start: 2021-10-11 | End: 2021-11-08 | Stop reason: SDUPTHER

## 2021-10-11 RX ORDER — GABAPENTIN 300 MG/1
300 CAPSULE ORAL NIGHTLY
Qty: 30 CAPSULE | Refills: 0 | Status: SHIPPED | OUTPATIENT
Start: 2021-10-12 | End: 2022-03-08 | Stop reason: SDUPTHER

## 2021-10-11 NOTE — TELEPHONE ENCOUNTER
From: Lucas Kulkarni  To:  SAHIL Mcduffie - ROCIO  Sent: 10/10/2021 8:41 PM EDT  Subject: Prescription Question    Good morning Mr Orta Casimiro  Would it be possible to get a refill of my Percocet 10/325 and gabapentin 300 mg thank you Severa Canal

## 2021-10-11 NOTE — TELEPHONE ENCOUNTER
OARRS reviewed. UDS: + for Ambien, Cyclobenzaprine, Gabapentin, Oxycodone. Last seen: 8/2/2021.  Follow-up: 11/8/21

## 2021-10-13 ENCOUNTER — HOSPITAL ENCOUNTER (OUTPATIENT)
Dept: WOUND CARE | Age: 61
Discharge: HOME | End: 2021-10-13
Payer: MEDICARE

## 2021-10-13 VITALS
WEIGHT: 315 LBS | OXYGEN SATURATION: 97 % | SYSTOLIC BLOOD PRESSURE: 127 MMHG | HEART RATE: 84 BPM | HEIGHT: 73 IN | RESPIRATION RATE: 20 BRPM | BODY MASS INDEX: 41.75 KG/M2 | TEMPERATURE: 98.6 F | DIASTOLIC BLOOD PRESSURE: 65 MMHG

## 2021-10-13 DIAGNOSIS — T81.89XD NON-HEALING SURGICAL WOUND, SUBSEQUENT ENCOUNTER: Primary | ICD-10-CM

## 2021-10-13 PROCEDURE — 99213 OFFICE O/P EST LOW 20 MIN: CPT | Performed by: NURSE PRACTITIONER

## 2021-10-13 PROCEDURE — 99213 OFFICE O/P EST LOW 20 MIN: CPT

## 2021-10-13 ASSESSMENT — PAIN SCALES - GENERAL: PAINLEVEL_OUTOF10: 0

## 2021-10-13 NOTE — PROGRESS NOTES
[address] f: [phone] f: [phone]: [phone] [email or web address]      Ordering Center:   [address] [address]  [phone]  WOUND CARE Dept: [phone]     [facility] [phone]    Patient Information:      Naomi Dominguez  [address] [address]   [phone]   : 1960  AGE: 63 y.o. GENDER: male   EPISODE DATE: 10/13/2021    Insurance:      PRIMARY INSURANCE:  Plan: Brandie Broad MEDICARE  Coverage: [employer] MEDICARE  Effective Date: 2018  Group Number: [unfilled]  Subscriber Number: [id number] - (Medicare Managed)    Payor/Plan Subscr  Sex Relation Sub. Ins. ID Effective Group Num   1. [address] 1960 Male Self [id number]                                   [address]       Patient Wound Information:      Problem List Items Addressed This Visit     None          WOUNDS REQUIRING DRESSING SUPPLIES:     Wound 21 Abdomen (Active)   Wound Image   21 1313   Wound Etiology Non-Healing Surgical 21 1313   Dressing Status Intact; New dressing applied 21 1313   Wound Cleansed Cleansed with saline 21 1313   Dressing/Treatment Packing;Gauze dressing/dressing sponge;ABD;Tape change 21 1313   Offloading for Diabetic Foot Ulcers No offloading required 21 1313   Wound Length (cm) 0.2 cm 10/13/21 1534   Wound Width (cm) 0.2 cm 10/13/21 1534   Wound Depth (cm) 0.6 cm 10/13/21 1534   Wound Surface Area (cm^2) 0.04 cm^2 10/13/21 1534   Change in Wound Size % (l*w) 84 10/13/21 1534   Wound Volume (cm^3) 0.024 cm^3 10/13/21 1534   Wound Healing % 86 10/13/21 1534   Tunneling Position ___ O'Clock 12 10/13/21 1534   Undermining Starts ___ O'Clock 12 10/13/21 1534   Undermining Ends___ O'Clock 0.7 10/13/21 1534   Wound Assessment Granulation tissue 10/13/21 1534 Drainage Amount Moderate 10/13/21 1534   Drainage Description Serosanguinous 10/13/21 1534   Odor Moderate 10/13/21 1534   Zoë-wound Assessment Intact;Dry/flaky; Blanchable erythema 10/13/21 1534   Margins Attached edges 10/13/21 1534   Wound Thickness Description not for Pressure Injury Full thickness 10/13/21 1534   Number of days: 21          Supplies Requested :      WOUND #: 1   PRIMARY DRESSING:  Alginate with silver pad   Cover and Secure with: 4X4 gauze pad tape with pink hytape     FREQUENCY OF DRESSING CHANGES:  Daily         ADDITIONAL ITEMS:  [] Gloves Small  [] Gloves Medium [] Gloves Large [x] Gloves XLarge  [] Tape 1\" [x] Tape 2\" [] Tape 3\"  [] Medipore Tape  [x] Saline  [x] Skin Prep   [] Adhesive Remover   [] Cotton Tip Applicators   [x] Other: Pink hytape    Patient Wound(s) Debrided: [x] Yes if yes please add date 10/13/2021  [] No    Debribement Type: Autolytic    Patient currently being seen by Home Health: [] Yes   [x] No    Duration for needed supplies:  []15  []30  []60  [x]90 Days    Electronically signed by Jasvir Denney LPN on 20/28/9573 at 4:23 PM     Provider Information:    Alka Diallo NAME: Nery Kenney CNP     NPI: [id number]

## 2021-10-13 NOTE — PLAN OF CARE
Problem: Wound:  Goal: Will show signs of wound healing; wound closure and no evidence of infection  Description: Will show signs of wound healing; wound closure and no evidence of infection  Outcome: Ongoing   Patient seen for abdominal wound. Wound shows signs of proper closure and healing. No s/s of infection noted. Follow up in 4 weeks. See AVS for order changes. Care plan reviewed with patient. Patient verbalize understanding of the plan of care and contribute to goal setting.

## 2021-10-13 NOTE — PROGRESS NOTES
[facility]  Consult and Procedure Note      Lucas Kulkarni  MEDICAL RECORD NUMBER:  [id number]  AGE: 63 y.o. GENDER: male  : 1960  EPISODE DATE:  10/13/2021    SUBJECTIVE:     Chief Complaint   Patient presents with    Wound Check     abdomen         HISTORY OF PRESENT ILLNESS      Lucas Kulkarni is a 63 y.o. male who presents today for evaluation of nonhealing abdominal wound. Patient has history of abdominal exploration, lysis of adhesions and right colon resection 2018 that resulted in cutting through mesh placed previously with hernia repairs. Patient states that several months ago he developed plastic strand from prior incision line which is reported to have fallen off after rubbing at it repeatedly. Shortly after this patient noted wound with drainage. He was evaluated by Dr. Hannah Frankel who discussed concerns for possible infection of mesh. He reportedly is a poor candidate for surgery due to complex medical history and extensive nature of surgery required for removal of mesh. Patient and Dr. Hannah Frankel opted for conservative management at this time as patient notes minimal associated symptoms. Current ordered wound care includes mesalt packing and dry gauze, changed daily. Patient states that he has stopped packing as wound has decreased in size, has instead been alternating zinc oxide and neosporin. He notes some drainage from wound but significantly improved from last evaluation. He denies pain, stating that he has minimal sensation to area since surgery. Denies any further needs or concerns.     PAST MEDICAL HISTORY             Diagnosis Date    Arthritis     Back problem     back pain-sees [facility] pain mgmt    Bladder disease     Dr. Caleb Mora CHF with unknown LVEF ([facility] [address] 2021    Dr. George/CHF clinic    Constipation     Diabetes mellitus [facility]     Dr. Tito Kiran Hypertension     Hypertensive emergency 2019    Hypertensive urgency 2019    Sleep apnea     has bipap-sees AZRA Olmedo CNP    Thyroid disease        PAST SURGICAL HISTORY     Past Surgical History:   Procedure Laterality Date    ABDOMEN SURGERY      APPENDECTOMY      CARDIAC CATHETERIZATION      no stents    CARPAL TUNNEL RELEASE Bilateral     COLONOSCOPY  2017    Dr. Guru Lee, ESOPHAGUS     [address]      x3 surgeries with 14 repairs    KNEE SURGERY Right 1980's    cartilage    [place] EXPLORATORY OF ABDOMEN N/A 2/5/2018    ABDOMINAL EXPLORATION WITH LYSIS OF COMPLICATED ADHESIONS WITH AN EXTENDED RIGHT COLON RESECTION performed by Billey Soulier, MD at [address] [place] [address] [address] HISTORY     Family History   Problem Relation Age of Onset    Cancer Mother         breast    Heart Disease Father         bladder, lung    Cancer Father     Stroke Brother     Heart Attack Brother     Prostate Cancer Brother     Diabetes Paternal Grandmother     Arthritis Brother     High Blood Pressure Neg Hx        SOCIAL HISTORY     Social History     Tobacco Use    Smoking status: Former Smoker    Smokeless tobacco: Current User     Types: Chew    Tobacco comment: quit pipe over 30 yrs ago   Vaping Use    Vaping Use: Never assessed   Substance Use Topics    Alcohol use: No     Alcohol/week: 0.0 standard drinks     Comment: quit    Drug use: No       ALLERGIES     Allergies   Allergen Reactions    Shellfish-Derived Products Swelling     Tolerates IV dye without any problems      Pcn [Penicillins] Itching    Succinylcholine      Pseudocholinesterase Deficiency    Sulfa Antibiotics Itching    Morphine Nausea And Vomiting     \"head swimming, skin crawling\"    Tape [Adhesive Tape] Rash       MEDICATIONS     Current Outpatient Medications on File Prior to Encounter   Medication Sig Dispense Refill    oxyCODONE-acetaminophen (PERCOCET)  MG per tablet Take 1 tablet by mouth every 8 hours as needed for Pain for up to 30 days.  Intended supply: 30 days 90 tablet 0    gabapentin (NEURONTIN) 300 MG capsule Take 1 capsule by mouth nightly for 30 days. 30 capsule 0    rOPINIRole (REQUIP) 1 MG tablet Take 1 pill in afternoon and 1 at bedtime 180 tablet 1    doxepin (SINEQUAN) 25 MG capsule Take 1 capsule by mouth nightly 30 capsule 3    ibuprofen (ADVIL;MOTRIN) 200 MG tablet Take 400 mg by mouth every 6 hours as needed for Pain      aspirin EC 81 MG EC tablet Take 1 tablet by mouth daily 90 tablet 1    sacubitril-valsartan (ENTRESTO) 49-51 MG per tablet Take 1 tablet by mouth 2 times daily 60 tablet 11    bumetanide (BUMEX) 1 MG tablet Take 1 tablet by mouth daily AND 1 tablet as needed.  60 tablet 6    cloNIDine (CATAPRES) 0.2 MG tablet Take 1 tablet by mouth 3 times daily      JANUVIA 100 MG tablet Take 1 tablet by mouth daily      amLODIPine (NORVASC) 10 MG tablet Take 1 tablet by mouth daily      spironolactone (ALDACTONE) 50 MG tablet Take 1 tablet by mouth 2 times daily 180 tablet 3    hydrALAZINE (APRESOLINE) 100 MG tablet Take 1 tablet by mouth every 8 hours 270 tablet 3    Fluticasone furoate-vilanterol (BREO ELLIPTA) 200-25 MCG/INH AEPB inhaler Inhale 1 puff into the lungs daily 3 each 3    oxybutynin (DITROPAN XL) 10 MG extended release tablet Take 1 tablet by mouth daily 90 tablet 3    tamsulosin (FLOMAX) 0.4 MG capsule Take 1 capsule by mouth nightly 90 capsule 3    montelukast (SINGULAIR) 10 MG tablet Take 1 tablet by mouth daily 30 tablet 11    levothyroxine (SYNTHROID) 200 MCG tablet Take 1 tablet by mouth Daily 30 tablet 1    bismuth subsalicylate (PEPTO BISMOL) 262 MG/15ML suspension Take by mouth daily      glimepiride (AMARYL) 4 MG tablet Take 8 mg by mouth daily       carvedilol (COREG) 25 MG tablet Take 1 tablet by mouth 2 times daily 180 tablet 3    acetaminophen (TYLENOL) 500 MG tablet Take 1 tablet by mouth every 6 hours as needed for Pain 40 tablet 0    cyclobenzaprine (FLEXERIL) 10 MG tablet Take 10 mg by mouth 3 times daily as needed for Muscle spasms      albuterol (PROVENTIL HFA;VENTOLIN HFA) 108 (90 BASE) MCG/ACT inhaler Inhale 1 puff into the lungs every 6 hours as needed for Wheezing.  azelastine (ASTELIN) 137 MCG/SPRAY nasal spray 1 spray by Nasal route as needed. Use in each nostril as directed      nitroGLYCERIN (NITROSTAT) 0.4 MG SL tablet Place 1 tablet under the tongue every 5 minutes as needed for Chest pain (up to 3 doses) 25 tablet 3     No current facility-administered medications on file prior to encounter. REVIEW OF SYSTEMS:     Constitutional: Denies fever, chills, night sweats, fatigue, weight loss/gain, loss of appetite   Head: Denies headache,  dizziness, loss of consciousness  Mouth/throat: Denies ulceration, dental caries , dysphagia  Respiratory: Denies shortness of breath, cough, wheezing, dyspnea with exertion  Cardiovascular:Denies chest pain, palpitations, edema  Gastrointestinal: Denies nausea, vomiting, constipation, diarrhea, abdominal pain   ZHENG: Denies dysuria, frequency, urgency, hematuria  Musculoskeletal: Denies joint pain, swelling , stiffness,  Endocrine: Denies polyuria, polydipsia, cold or heat intolerance  Hematology: Denies easy brusing or bleeding, hx of clotting disorder  Dermatology: +wound Denies skin rash, eczema, pruritis    PHYSICAL EXAM:     /65   Pulse 84   Temp 98.6 °F (37 °C) (Infrared)   Resp 20   Ht 6' 1\" (1.854 m)   Wt (!) 348 lb (157.9 kg)   SpO2 97%   BMI 45.91 kg/m²   Wt Readings from Last 3 Encounters:   10/13/21 (!) 348 lb (157.9 kg)   09/29/21 (!) 348 lb (157.9 kg)   09/22/21 (!) 346 lb (156.9 kg)       General:  Awake, alert, no apparent distress. Appears stated age  [de-identified]: conjuctivae are clear without exudate or hemorrhage, anicteric sclera, moist oral mucosa. Chest:  Respirations regular, non-labored. Chest rise and fall equal bilaterally.   Abdomen:  Soft, non tender to palpation, obese, surgical scarring  Neurological: Awake, alert, oriented x4  Psychiatric:  Appropriate mood and affect  Extremities: non-traumatic in appearance. Skin:  Warm and dry  Wound:    Midline abdomen wound bed granular with moderate amounts of serosanguinous drainage. Periwound skin is dry with blanchable erythema. No warmth noted. Wound 09/22/21 Abdomen (Active)   Wound Image   10/13/21 1534   Wound Etiology Non-Healing Surgical 09/22/21 1313   Dressing Status Intact; New dressing applied 09/22/21 1313   Wound Cleansed Cleansed with saline 09/22/21 1313   Dressing/Treatment Packing;Gauze dressing/dressing sponge;ABD;Tape change 09/22/21 1313   Offloading for Diabetic Foot Ulcers No offloading required 09/22/21 1313   Wound Length (cm) 0.2 cm 10/13/21 1534   Wound Width (cm) 0.2 cm 10/13/21 1534   Wound Depth (cm) 0.6 cm 10/13/21 1534   Wound Surface Area (cm^2) 0.04 cm^2 10/13/21 1534   Change in Wound Size % (l*w) 84 10/13/21 1534   Wound Volume (cm^3) 0.024 cm^3 10/13/21 1534   Wound Healing % 86 10/13/21 1534   Tunneling Position ___ O'Clock 12 10/13/21 1534   Undermining Starts ___ O'Clock 12 10/13/21 1534   Undermining Ends___ O'Clock 0.7 10/13/21 1534   Wound Assessment Granulation tissue 10/13/21 1534   Drainage Amount Moderate 10/13/21 1534   Drainage Description Serosanguinous 10/13/21 1534   Odor Moderate 10/13/21 1534   Zoë-wound Assessment Intact;Dry/flaky; Blanchable erythema 10/13/21 1534   Margins Attached edges 10/13/21 1534   Wound Thickness Description not for Pressure Injury Full thickness 10/13/21 1534   Number of days: 21         LABS/IMAGING     UA: No results for input(s): SPECGRAV, PHUR, COLORU, CLARITYU, MUCUS, PROTEINU, BLOODU, RBCUA, WBCUA, BACTERIA, NITRU, GLUCOSEU, BILIRUBINUR, UROBILINOGEN, KETUA, LABCAST, LABCASTTY, AMORPHOS in the last 72 hours.     Invalid input(s): CRYSTALS  Micro:   Lab Results   Component Value Date    BC No growth-preliminary No growth  03/02/2021     ASSESSMENT     -Nonhealing surgical wound    Ulcer Identification:  Ulcer Type: non-healing surgical    Depth of Diabetic/Pressure/Non Pressure Ulcers or Wound:  Wound, full thickness     Patient Active Problem List   Diagnosis Code    Allergy to bee sting Z91.030    Obesity, Class III, BMI 40-49.9 (morbid obesity) ([facility]) E66.01    Weight gain R63.5    Essential hypertension I10    Rheumatoid arteritis ([facility]) M05.20    Hypothyroidism E03.9    Gastroenteritis K52.9    Obstructive sleep apnea on CPAP G47.33, Z99.89    Small intestinal bacterial overgrowth K63.89    Acute diarrhea R19.7    Generalized abdominal pain R10.84    Nausea and vomiting R11.2    Hepatomegaly R16.0    Ileus ([facility]) K56.7    Hypokalemia E87.6    S/P partial resection of colon Z90.49    S/P laparotomy Z98.890    Acute kidney failure with tubular necrosis ([facility]) N17.0    Hypernatremia E87.0    Serum potassium decreased E87.6    Other headache syndrome G44.89    RLS (restless legs syndrome) G25.81    Psychophysiological insomnia F51.04    Angina, class III ([facility]) I20.9    Acute respiratory failure with hypoxia ([facility]) J96.01    Dyspnea R06.00    CHF (NYHA class III, ACC/AHA stage C) ([facility]) I50.9    Nonhealing surgical wound T81.89XA    Type 2 diabetes mellitus, without long-term current use of insulin ([facility] [address] E11.9       PLAN     Patient examined and evaluated. All available lab work, radiology studies, and progress notes pertaining to [facility] reviewed prior to or during patient visit today.    -Nonhealing surgical wound, abdomen- Wound shows significant improvement at today's visit. Recommend against use of neosporin ointment. Discontinue mesalt packing  Start wound care orders as follows: Abdomen- Apply silver alginate to wound bed, cover with a gauze and tape with pink hytape. Change every day    -Wound does not appear to be infected at today's visit. Continue Chlorhexidine gluconate wash 2-3 times weekly.   Reinforced education on signs and symptoms of infection. Advised patient to call clinic or seek emergency care should these occur. Follow up 2-3 weeks. Call clinic with any needs or concerns prior to scheduled visit. All questions and concerns addressed prior to discharge from today's visit. Please see attached Discharge Instructions    Written patient dismissal instructions given to patient and signed by patient or POA. Discharge Instructions         Discharge Instructions       Visit Discharge/Physician Orders:  -Dressing supplies ordered through [facility], they will deliver to your home within the next couple days. If you do not receive these by then please call.  -You can get diaper rash cream over the counter.  -Okay to shower and cleanse with Tri-Hex soap a few times a week to decrease bacteria.      Wound Location: Abdomen     Dressing orders:      1) Gather wound care supplies and arrange on clean table.      2) Wash your hands with soap and water or use alcohol based hand  for 20 seconds (sing \"Happy Birthday\" twice).    3) Cleanse wounds with normal saline or wound cleanser and gauze. Pat dry with clean gauze., Apply a skin prep to skin around the wound     4) Abdomen- Apply silver alginate to wound bed, cover with a gauze and tape with pink hytape. Change every days    Keep all dressings clean & dry.     Follow up visit: 4 weeks 11/11/2021 at 1:00     Keep next scheduled appointment. Please give 24 hour notice if unable to keep appointment. [phone]     If you experience any of the following, please call the Wound Care Service during business hours: Monday through Friday 8:00 am - 4:30 pm  ([phone]).               *Increase in pain              *Temperature over 101              *Increase in drainage from your wound or a foul odor              *Uncontrolled swelling              *Need for compression bandage changes due to slippage, breakthrough drainage     If you need medical attention outside of business hours, please contact your Primary Care Doctor or go to the nearest emergency room.                     Electronically signed by Briant Galeazzi, APRN - CNP on 10/13/2021 at 5:44 PM

## 2021-10-13 NOTE — PROGRESS NOTES
Antonio Manzanares R.N. has reviewed and agrees with Juan Francisco George LPN's shift   Assessment.     Dennis Whiteside RN  10/13/2021

## 2021-10-20 DIAGNOSIS — I50.32 CHF (CONGESTIVE HEART FAILURE), NYHA CLASS II, CHRONIC, DIASTOLIC (HCC): Primary | ICD-10-CM

## 2021-10-20 PROCEDURE — 93264 REM MNTR WRLS P-ART PRS SNR: CPT | Performed by: NURSE PRACTITIONER

## 2021-10-27 ENCOUNTER — HOSPITAL ENCOUNTER (OUTPATIENT)
Age: 61
Discharge: HOME | End: 2021-10-27
Payer: MEDICARE

## 2021-10-27 DIAGNOSIS — I50.32 CHF (CONGESTIVE HEART FAILURE), NYHA CLASS II, CHRONIC, DIASTOLIC (HCC): ICD-10-CM

## 2021-10-27 LAB
ANION GAP SERPL CALCULATED.3IONS-SCNC: 11 MEQ/L (ref 8–16)
BUN BLDV-MCNC: 16 MG/DL (ref 7–22)
CALCIUM SERPL-MCNC: 9.8 MG/DL (ref 8.5–10.5)
CHLORIDE BLD-SCNC: 98 MEQ/L (ref 98–111)
CO2: 26 MEQ/L (ref 23–33)
CREAT SERPL-MCNC: 1.3 MG/DL (ref 0.4–1.2)
GFR SERPL CREATININE-BSD FRML MDRD: 56 ML/MIN/1.73M2
GLUCOSE BLD-MCNC: 161 MG/DL (ref 70–108)
POTASSIUM SERPL-SCNC: 4.6 MEQ/L (ref 3.5–5.2)
SODIUM BLD-SCNC: 135 MEQ/L (ref 135–145)

## 2021-10-27 PROCEDURE — 36415 COLL VENOUS BLD VENIPUNCTURE: CPT

## 2021-10-27 PROCEDURE — 80048 BASIC METABOLIC PNL TOTAL CA: CPT

## 2021-11-08 ENCOUNTER — OFFICE VISIT (OUTPATIENT)
Dept: PHYSICAL MEDICINE AND REHAB | Age: 61
End: 2021-11-08
Payer: MEDICARE

## 2021-11-08 VITALS
WEIGHT: 315 LBS | BODY MASS INDEX: 41.75 KG/M2 | HEIGHT: 73 IN | DIASTOLIC BLOOD PRESSURE: 70 MMHG | SYSTOLIC BLOOD PRESSURE: 130 MMHG

## 2021-11-08 DIAGNOSIS — M48.062 SPINAL STENOSIS OF LUMBAR REGION WITH NEUROGENIC CLAUDICATION: ICD-10-CM

## 2021-11-08 DIAGNOSIS — M47.816 LUMBAR FACET ARTHROPATHY: ICD-10-CM

## 2021-11-08 DIAGNOSIS — M54.17 LUMBOSACRAL RADICULITIS: ICD-10-CM

## 2021-11-08 DIAGNOSIS — M47.816 SPONDYLOSIS OF LUMBAR REGION WITHOUT MYELOPATHY OR RADICULOPATHY: ICD-10-CM

## 2021-11-08 DIAGNOSIS — M25.561 CHRONIC PAIN OF RIGHT KNEE: ICD-10-CM

## 2021-11-08 DIAGNOSIS — G62.9 NEUROPATHY: ICD-10-CM

## 2021-11-08 DIAGNOSIS — G89.29 CHRONIC PAIN OF RIGHT KNEE: ICD-10-CM

## 2021-11-08 DIAGNOSIS — G89.4 CHRONIC PAIN SYNDROME: ICD-10-CM

## 2021-11-08 DIAGNOSIS — M17.11 PRIMARY OSTEOARTHRITIS OF RIGHT KNEE: Primary | ICD-10-CM

## 2021-11-08 PROCEDURE — 99214 OFFICE O/P EST MOD 30 MIN: CPT | Performed by: NURSE PRACTITIONER

## 2021-11-08 PROCEDURE — G8417 CALC BMI ABV UP PARAM F/U: HCPCS | Performed by: NURSE PRACTITIONER

## 2021-11-08 PROCEDURE — 4004F PT TOBACCO SCREEN RCVD TLK: CPT | Performed by: NURSE PRACTITIONER

## 2021-11-08 PROCEDURE — G8427 DOCREV CUR MEDS BY ELIG CLIN: HCPCS | Performed by: NURSE PRACTITIONER

## 2021-11-08 PROCEDURE — G8484 FLU IMMUNIZE NO ADMIN: HCPCS | Performed by: NURSE PRACTITIONER

## 2021-11-08 PROCEDURE — 3017F COLORECTAL CA SCREEN DOC REV: CPT | Performed by: NURSE PRACTITIONER

## 2021-11-08 RX ORDER — OXYCODONE AND ACETAMINOPHEN 10; 325 MG/1; MG/1
1 TABLET ORAL EVERY 8 HOURS PRN
Qty: 90 TABLET | Refills: 0 | Status: SHIPPED | OUTPATIENT
Start: 2021-11-10 | End: 2021-12-09 | Stop reason: SDUPTHER

## 2021-11-08 ASSESSMENT — ENCOUNTER SYMPTOMS
CHEST TIGHTNESS: 1
BACK PAIN: 1
COUGH: 1
SHORTNESS OF BREATH: 1

## 2021-11-08 NOTE — PROGRESS NOTES
[facility] [address]  Dept: [phone]  Dept Fax: [phone]: [phone]    Visit Date: 11/8/2021    Functionality Assessment/Goals Worksheet     On a scale of 0 (Does not Interfere) to 10 (Completely Interferes)     1. Which number describes how during the past week pain has interfered with       the following:  A. General Activity:  9  B. Mood: 8  C. Walking Ability:  10  D. Normal Work (Includes both work outside the home and housework):  10  E. Relations with Other People:   7  F. Sleep:   9  G. Enjoyment of Life:   9    2. Patient Prefers to Take their Pain Medications:     [x]  On a regular basis   []  Only when necessary    []  Does not take pain medications    3. What are the Patient's Goals/Expectations for Visiting Pain Management? []  Learn about my pain    [x]  Receive Medication   []  Physical Therapy     []  Treat Depression   []  Receive Injections    []  Treat Sleep   []  Deal with Anxiety and Stress   []  Treat Opoid Dependence/Addiction   []  Other:      HPI:   Andrea Rivas is a 63 y.o. male is here today for    Chief Complaint: Low back pain, knee pain     HPI   3 month FU. Continues to have pain in lower back radiating across lower back and down into buttocks/ SI area and down right lower extremity posteriorly to feet. Intremirrtent numbness in right leg. Denied leg pain today. Pain mainly in low back. Throbbing deep tooth ache in low back     Continues to has pin in knees but states not that backing- aching   Did not get right knee steroid injection d/t Dr. Geovanny Orellana being off. Pain increases with bending, lifting, walking, standing and getting up and down. States that currently pain medications are very effective decreasing pain to a tolerable level. Able to getthrough daily activity   Medications reviewed.  Patient denies side Conjunctivae normal.      Pupils: Pupils are equal, round, and reactive to light. Cardiovascular:      Rate and Rhythm: Normal rate and regular rhythm. Pulses: Normal pulses. Heart sounds: Murmur heard. Pulmonary:      Effort: Pulmonary effort is normal. No respiratory distress. Breath sounds: Normal breath sounds. Abdominal:      General: Abdomen is flat. Bowel sounds are normal. There is no distension. Palpations: Abdomen is soft. There is no mass. Tenderness: There is no abdominal tenderness. Hernia: No hernia is present. Musculoskeletal:         General: Tenderness present. Right shoulder: Tenderness present. Decreased range of motion. Right wrist: Tenderness present. Decreased range of motion. Left wrist: Tenderness present. Decreased range of motion. Cervical back: Neck supple. Tenderness and bony tenderness present. Muscular tenderness present. Thoracic back: Bony tenderness present. Decreased range of motion. Lumbar back: Tenderness and bony tenderness present. Decreased range of motion. Back:       Right hip: Decreased range of motion. Decreased strength. Left hip: Decreased range of motion. Decreased strength. Right knee: Swelling present. Decreased range of motion. Tenderness present. Right lower leg: Edema present. Left lower leg: Edema present. Right ankle: Swelling present. Tenderness present. Decreased range of motion. Left ankle: Tenderness present. Skin:     General: Skin is warm and dry. Capillary Refill: Capillary refill takes less than 2 seconds. Neurological:      General: No focal deficit present. Mental Status: He is alert and oriented to person, place, and time. Sensory: Sensory deficit present. Motor: Weakness present. Coordination: Romberg sign positive.  Coordination abnormal.      Gait: Gait abnormal.      Deep Tendon Reflexes:      Reflex Scores: Tricep reflexes are 2+ on the right side and 2+ on the left side. Bicep reflexes are 2+ on the right side and 2+ on the left side. Brachioradialis reflexes are 2+ on the right side and 2+ on the left side. Patellar reflexes are 1+ on the right side and 1+ on the left side. Achilles reflexes are 1+ on the right side and 1+ on the left side. Comments: 4/5/strength bilateral lower extremities    + bilateral SLR at 40 degrees    Decraesed sensation bilateral feet    Psychiatric:         Mood and Affect: Mood normal.         Behavior: Behavior normal.       SAUNDRA test: + bilaterally   Yeomans test: + bilaterally   Gaenslen test: + bilaterally     Assessment:     1. Primary osteoarthritis of right knee    2. Chronic pain of right knee    3. Lumbosacral radiculitis    4. Spinal stenosis of lumbar region with neurogenic claudication    5. Spondylosis of lumbar region without myelopathy or radiculopathy    6. Lumbar facet arthropathy    7. Neuropathy    8. Chronic pain syndrome            Plan:      · OARRS reviewed. Current MED: 45.00  · Patient was offered naloxone for home. · Discussed long term side effects of medications, tolerance, dependency and addiction. · Previous UDS reviewed  · UDS preformed today for compliance. · Patient told can not receive any pain medications from any other source. · No evidence of abuse, diversion or aberrant behavior.  Medications and/or procedures to improve function and quality of life- patient understanding with this and that may not be pain free   Discussed with patient about safe storage of medications at home   Discussed possible weaning of medication dosing dependent on treatment/procedure results.  Discussed with patient about risks with procedure including infection, reaction to medication, increased pain, or bleeding.    Reviewed L-Xray and L-MRI in detail, reviewed right knee and shoulder xray again with patient   He would like to wait on procedures until Dr. Fuchs [place] returns.  Discussed right knee injection, L-facet JOSÉ LUIS TRACEY in future    Continue home exercises    Continue Percocet 10/325 TID prn - ordered refill   Continue Neurontin 300 mg at bedtime- filled 10/20/2021 and has plenty    UDS ordered today       Meds. Prescribed:   Orders Placed This Encounter   Medications    oxyCODONE-acetaminophen (PERCOCET)  MG per tablet     Sig: Take 1 tablet by mouth every 8 hours as needed for Pain for up to 30 days. Intended supply: 30 days     Dispense:  90 tablet     Refill:  0     Reduce doses taken as pain becomes manageable       Return in about 10 weeks (around 1/17/2022), or if symptoms worsen or fail to improve, for follow up after procedure.                Electronically signed by SAHIL Mak CNP on11/8/2021 at 12:21 PM

## 2021-11-11 ENCOUNTER — HOSPITAL ENCOUNTER (OUTPATIENT)
Dept: WOUND CARE | Age: 61
Discharge: HOME | End: 2021-11-11
Payer: MEDICARE

## 2021-11-11 VITALS
RESPIRATION RATE: 20 BRPM | SYSTOLIC BLOOD PRESSURE: 137 MMHG | TEMPERATURE: 98.3 F | OXYGEN SATURATION: 96 % | DIASTOLIC BLOOD PRESSURE: 79 MMHG | HEART RATE: 81 BPM

## 2021-11-11 DIAGNOSIS — T81.89XD NON-HEALING SURGICAL WOUND, SUBSEQUENT ENCOUNTER: Primary | ICD-10-CM

## 2021-11-11 PROCEDURE — 99213 OFFICE O/P EST LOW 20 MIN: CPT

## 2021-11-11 PROCEDURE — 99212 OFFICE O/P EST SF 10 MIN: CPT | Performed by: NURSE PRACTITIONER

## 2021-11-11 ASSESSMENT — PAIN SCALES - GENERAL: PAINLEVEL_OUTOF10: 8

## 2021-11-11 ASSESSMENT — PAIN DESCRIPTION - LOCATION: LOCATION: GENERALIZED

## 2021-11-11 ASSESSMENT — PAIN DESCRIPTION - PAIN TYPE: TYPE: CHRONIC PAIN

## 2021-11-11 NOTE — PROGRESS NOTES
[address] [facility]  Consult and Procedure Note      Radha Davenport  MEDICAL RECORD NUMBER:  [id number]  AGE: 63 y.o. GENDER: male  : 1960  EPISODE DATE:  2021    SUBJECTIVE:     Chief Complaint   Patient presents with    Wound Check     abdomen         HISTORY OF PRESENT ILLNESS      Radha Davenport is a 63 y.o. male who presents today for evaluation of nonhealing abdominal wound. Patient has history of abdominal exploration, lysis of adhesions and right colon resection 2018 that resulted in cutting through mesh placed previously with hernia repairs. Patient states that several months ago he developed plastic strand from prior incision line which is reported to have fallen off after rubbing at it repeatedly. Shortly after this patient noted wound with drainage. He was evaluated by Dr. Lisa Davison who discussed concerns for possible infection of mesh. He reportedly is a poor candidate for surgery due to complex medical history and extensive nature of surgery required for removal of mesh. Patient and Dr. Lisa Davison opted for conservative management at this time as patient notes minimal associated symptoms. Current ordered wound care includes alginate and gauze dressings changed daily. He notes ongoing drainage from wound, mostly unchanged from last visit. He denies pain, stating that he has minimal sensation to area since surgery. Denies any further needs or concerns. PAST MEDICAL HISTORY             Diagnosis Date    Arthritis     Back problem     back pain-sees [facility] pain mgmt    Bladder disease     Dr. Sunitha Vines CHF with unknown LVEF ([facility] [address] 2021    Dr. George/CHF clinic    Constipation     Diabetes mellitus ([facility] [address]     Dr. Magi See Hypertension     Hypertensive emergency 2019    Hypertensive urgency 2019    Sleep apnea     has bipap-sees AZRA Pierre CNP    Thyroid disease        PAST SURGICAL HISTORY     Past Surgical History:   Procedure Laterality Date    ABDOMEN SURGERY      APPENDECTOMY      CARDIAC CATHETERIZATION      no stents    CARPAL TUNNEL RELEASE Bilateral     COLONOSCOPY  2017    Dr. Navin Mehta, ESOPHAGUS     [address]      x3 surgeries with 14 repairs    KNEE SURGERY Right 1980's    cartilage    OR EXPLORATORY OF ABDOMEN N/A 2/5/2018    ABDOMINAL EXPLORATION WITH LYSIS OF COMPLICATED ADHESIONS WITH AN EXTENDED RIGHT COLON RESECTION performed by Jennifer Pride MD at [address] [place] [address] [address] HISTORY     Family History   Problem Relation Age of Onset    Cancer Mother         breast    Heart Disease Father         bladder, lung    Cancer Father     Stroke Brother     Heart Attack Brother     Prostate Cancer Brother     Diabetes Paternal Grandmother     Arthritis Brother     High Blood Pressure Neg Hx        SOCIAL HISTORY     Social History     Tobacco Use    Smoking status: Former Smoker    Smokeless tobacco: Current User     Types: Chew    Tobacco comment: quit pipe over 30 yrs ago   Vaping Use    Vaping Use: Not on file   Substance Use Topics    Alcohol use: No     Alcohol/week: 0.0 standard drinks     Comment: quit    Drug use: No       ALLERGIES     Allergies   Allergen Reactions    Shellfish-Derived Products Swelling     Tolerates IV dye without any problems      Pcn [Penicillins] Itching    Succinylcholine      Pseudocholinesterase Deficiency    Sulfa Antibiotics Itching    Morphine Nausea And Vomiting     \"head swimming, skin crawling\"    Tape [Adhesive Tape] Rash       MEDICATIONS     Current Outpatient Medications on File Prior to Encounter   Medication Sig Dispense Refill    oxyCODONE-acetaminophen (PERCOCET)  MG per tablet Take 1 tablet by mouth every 8 hours as needed for Pain for up to 30 days. Intended supply: 30 days 90 tablet 0    gabapentin (NEURONTIN) 300 MG capsule Take 1 capsule by mouth nightly for 30 days.  30 capsule 0    rOPINIRole (REQUIP) 1 MG tablet Take 1 pill in afternoon and 1 at bedtime 180 tablet 1    doxepin (SINEQUAN) 25 MG capsule Take 1 capsule by mouth nightly 30 capsule 3    ibuprofen (ADVIL;MOTRIN) 200 MG tablet Take 400 mg by mouth every 6 hours as needed for Pain      aspirin EC 81 MG EC tablet Take 1 tablet by mouth daily 90 tablet 1    sacubitril-valsartan (ENTRESTO) 49-51 MG per tablet Take 1 tablet by mouth 2 times daily 60 tablet 11    bumetanide (BUMEX) 1 MG tablet Take 1 tablet by mouth daily AND 1 tablet as needed.  60 tablet 6    cloNIDine (CATAPRES) 0.2 MG tablet Take 1 tablet by mouth 3 times daily      JANUVIA 100 MG tablet Take 1 tablet by mouth daily      amLODIPine (NORVASC) 10 MG tablet Take 1 tablet by mouth daily      spironolactone (ALDACTONE) 50 MG tablet Take 1 tablet by mouth 2 times daily 180 tablet 3    hydrALAZINE (APRESOLINE) 100 MG tablet Take 1 tablet by mouth every 8 hours 270 tablet 3    nitroGLYCERIN (NITROSTAT) 0.4 MG SL tablet Place 1 tablet under the tongue every 5 minutes as needed for Chest pain (up to 3 doses) 25 tablet 3    Fluticasone furoate-vilanterol (BREO ELLIPTA) 200-25 MCG/INH AEPB inhaler Inhale 1 puff into the lungs daily 3 each 3    oxybutynin (DITROPAN XL) 10 MG extended release tablet Take 1 tablet by mouth daily 90 tablet 3    tamsulosin (FLOMAX) 0.4 MG capsule Take 1 capsule by mouth nightly 90 capsule 3    montelukast (SINGULAIR) 10 MG tablet Take 1 tablet by mouth daily 30 tablet 11    levothyroxine (SYNTHROID) 200 MCG tablet Take 1 tablet by mouth Daily 30 tablet 1    bismuth subsalicylate (PEPTO BISMOL) 262 MG/15ML suspension Take by mouth daily      glimepiride (AMARYL) 4 MG tablet Take 8 mg by mouth daily       carvedilol (COREG) 25 MG tablet Take 1 tablet by mouth 2 times daily 180 tablet 3    acetaminophen (TYLENOL) 500 MG tablet Take 1 tablet by mouth every 6 hours as needed for Pain 40 tablet 0    cyclobenzaprine (FLEXERIL) 10 MG tablet Take 10 mg by mouth 3 times daily as needed for Muscle spasms      albuterol (PROVENTIL HFA;VENTOLIN HFA) 108 (90 BASE) MCG/ACT inhaler Inhale 1 puff into the lungs every 6 hours as needed for Wheezing.  azelastine (ASTELIN) 137 MCG/SPRAY nasal spray 1 spray by Nasal route as needed. Use in each nostril as directed       No current facility-administered medications on file prior to encounter. REVIEW OF SYSTEMS:     Constitutional: Denies fever, chills, night sweats, fatigue, weight loss/gain, loss of appetite   Head: Denies headache,  dizziness, loss of consciousness  Mouth/throat: Denies ulceration, dental caries , dysphagia  Respiratory: Denies shortness of breath, cough, wheezing, dyspnea with exertion  Cardiovascular:Denies chest pain, palpitations, edema  Gastrointestinal: Denies nausea, vomiting, constipation, diarrhea, abdominal pain   ZHENG: Denies dysuria, frequency, urgency, hematuria  Musculoskeletal: Denies joint pain, swelling , stiffness,  Endocrine: Denies polyuria, polydipsia, cold or heat intolerance  Hematology: Denies easy brusing or bleeding, hx of clotting disorder  Dermatology: +wound Denies skin rash, eczema, pruritis    PHYSICAL EXAM:     There were no vitals taken for this visit. Wt Readings from Last 3 Encounters:   11/08/21 (!) 348 lb (157.9 kg)   10/13/21 (!) 348 lb (157.9 kg)   09/29/21 (!) 348 lb (157.9 kg)       General:  Awake, alert, no apparent distress. Appears stated age  [de-identified]: conjuctivae are clear without exudate or hemorrhage, anicteric sclera, moist oral mucosa. Chest:  Respirations regular, non-labored. Chest rise and fall equal bilaterally. Abdomen:  Soft, non tender to palpation, obese, surgical scarring  Neurological: Awake, alert, oriented x4  Psychiatric:  Appropriate mood and affect  Extremities: non-traumatic in appearance. Skin:  Warm and dry  Wound:    Midline abdomen wound bed shows exposed mesh with moderate amounts of serosanguinous drainage.   Periwound skin is dry with blanchable erythema. No warmth noted. Wound 09/22/21 Abdomen (Active)   Wound Image   10/13/21 1534   Wound Etiology Non-Healing Surgical 09/22/21 1313   Dressing Status Intact; New dressing applied 09/22/21 1313   Wound Cleansed Cleansed with saline 09/22/21 1313   Dressing/Treatment Packing; Gauze dressing/dressing sponge; ABD; Tape change 09/22/21 1313   Offloading for Diabetic Foot Ulcers No offloading required 09/22/21 1313   Wound Length (cm) 0.2 cm 10/13/21 1534   Wound Width (cm) 0.2 cm 10/13/21 1534   Wound Depth (cm) 0.6 cm 10/13/21 1534   Wound Surface Area (cm^2) 0.04 cm^2 10/13/21 1534   Change in Wound Size % (l*w) 84 10/13/21 1534   Wound Volume (cm^3) 0.024 cm^3 10/13/21 1534   Wound Healing % 86 10/13/21 1534   Tunneling Position ___ O'Clock 12 10/13/21 1534   Undermining Starts ___ O'Clock 12 10/13/21 1534   Undermining Ends___ O'Clock 0.7 10/13/21 1534   Wound Assessment Granulation tissue 10/13/21 1534   Drainage Amount Moderate 10/13/21 1534   Drainage Description Serosanguinous 10/13/21 1534   Odor Moderate 10/13/21 1534   Zoë-wound Assessment Intact; Dry/flaky; Blanchable erythema 10/13/21 1534   Margins Attached edges 10/13/21 1534   Wound Thickness Description not for Pressure Injury Full thickness 10/13/21 1534   Number of days: 50         LABS/IMAGING     UA: No results for input(s): SPECGRAV, PHUR, COLORU, CLARITYU, MUCUS, PROTEINU, BLOODU, RBCUA, WBCUA, BACTERIA, NITRU, GLUCOSEU, BILIRUBINUR, UROBILINOGEN, KETUA, LABCAST, LABCASTTY, AMORPHOS in the last 72 hours.     Invalid input(s): CRYSTALS  Micro:   Lab Results   Component Value Date    BC No growth-preliminary No growth  03/02/2021     ASSESSMENT     -Nonhealing surgical wound    Ulcer Identification:  Ulcer Type: non-healing surgical    Depth of Diabetic/Pressure/Non Pressure Ulcers or Wound:  Wound, full thickness     Patient Active Problem List   Diagnosis Code    Allergy to bee sting Z91.030    Obesity, Class III, BMI 40-49.9 (morbid obesity) ([facility] E66.01    Weight gain R63.5    Essential hypertension I10    Rheumatoid arteritis (HCC) M05.20    Hypothyroidism E03.9    Gastroenteritis K52.9    Obstructive sleep apnea on CPAP G47.33, Z99.89    Small intestinal bacterial overgrowth K63.89    Acute diarrhea R19.7    Generalized abdominal pain R10.84    Nausea and vomiting R11.2    Hepatomegaly R16.0    Ileus (HCC) K56.7    Hypokalemia E87.6    S/P partial resection of colon Z90.49    S/P laparotomy Z98.890    Acute kidney failure with tubular necrosis (HCC) N17.0    Hypernatremia E87.0    Serum potassium decreased E87.6    Other headache syndrome G44.89    RLS (restless legs syndrome) G25.81    Psychophysiological insomnia F51.04    Angina, class III (HCC) I20.9    Acute respiratory failure with hypoxia (HCC) J96.01    Dyspnea R06.00    CHF (NYHA class III, ACC/AHA stage C) (HCC) I50.9    Nonhealing surgical wound T81.89XA    Type 2 diabetes mellitus, without long-term current use of insulin [facility] E11.9       PLAN     Patient examined and evaluated. All available lab work, radiology studies, and progress notes pertaining to [facility] reviewed prior to or during patient visit today.    -Nonhealing surgical wound, abdomen- Wound continues, exposed mesh now visible to wound bed. Discussed with patient that wound is unlikely to heal with exposed mesh. As surgery is high risk, goals of wound at this point are prevention of infection and control of drainage. Continue wound care as follows: Abdomen- Apply silver alginate to wound bed, cover with a gauze and tape with pink hytape. Change every day    -Wound does not appear to be infected at today's visit. Continue Chlorhexidine gluconate wash 2-3 times weekly. Reinforced education on signs and symptoms of infection. Advised patient to call clinic or seek emergency care should these occur. Follow up 4 weeks.   Call clinic with any needs or concerns prior to scheduled visit. All questions and concerns addressed prior to discharge from today's visit. Please see attached Discharge Instructions    Written patient dismissal instructions given to patient and signed by patient or POA. Discharge Instructions     Discharge Instructions       Visit Discharge/Physician Orders:  -Dressing supplies ordered through [facility], they will deliver to your home within the next couple days. If you do not receive these by then please call.  -You can get diaper rash cream over the counter.  -Okay to shower and cleanse with Tri-Hex soap a few times a week to decrease bacteria.      Wound Location: Abdomen     Dressing orders:      1) Gather wound care supplies and arrange on clean table.      2) Wash your hands with soap and water or use alcohol based hand  for 20 seconds (sing \"Happy Birthday\" twice).    3) Cleanse wounds with normal saline or wound cleanser and gauze. Pat dry with clean gauze., Apply a skin prep to skin around the wound     4) Abdomen- Apply silver alginate to wound bed, cover with a gauze and tape with pink hytape. Change every days     Keep all dressings clean & dry.     Follow up visit: 4 weeks 11/11/2021 at 1:00     Keep next scheduled appointment. Please give 24 hour notice if unable to keep appointment. [phone]     If you experience any of the following, please call the Wound Care Service during business hours: Monday through Friday 8:00 am - 4:30 pm  ([phone]).               *Increase in pain              *Temperature over 101              *Increase in drainage from your wound or a foul odor              *Uncontrolled swelling              *Need for compression bandage changes due to slippage, breakthrough drainage     If you need medical attention outside of business hours, please contact your Primary Care Doctor or go to the nearest emergency room.            Electronically signed by SAHIL Liriano CNP on 11/11/2021 at 1:23 PM

## 2021-11-11 NOTE — PLAN OF CARE
Problem: Wound:  Goal: Will show signs of wound healing; wound closure and no evidence of infection  Description: Will show signs of wound healing; wound closure and no evidence of infection  Outcome: Ongoing     Patient presents to wound clinic for abdominal wound. See avs for discharge instruction. Follow up visit: 6 weeks on Thursday December 23rd at 1:00 pm.    Care plan reviewed with patient. Patient  verbalize understanding of the plan of care and contribute to goal setting.

## 2021-11-22 DIAGNOSIS — I50.32 CHF (CONGESTIVE HEART FAILURE), NYHA CLASS II, CHRONIC, DIASTOLIC (HCC): Primary | ICD-10-CM

## 2021-11-22 PROCEDURE — 93264 REM MNTR WRLS P-ART PRS SNR: CPT | Performed by: NURSE PRACTITIONER

## 2021-12-08 ENCOUNTER — PATIENT MESSAGE (OUTPATIENT)
Dept: PHYSICAL MEDICINE AND REHAB | Age: 61
End: 2021-12-08

## 2021-12-08 DIAGNOSIS — G89.4 CHRONIC PAIN SYNDROME: ICD-10-CM

## 2021-12-09 RX ORDER — OXYCODONE AND ACETAMINOPHEN 10; 325 MG/1; MG/1
1 TABLET ORAL EVERY 8 HOURS PRN
Qty: 90 TABLET | Refills: 0 | Status: SHIPPED | OUTPATIENT
Start: 2021-12-10 | End: 2022-01-10 | Stop reason: SDUPTHER

## 2021-12-09 NOTE — TELEPHONE ENCOUNTER
OARRS reviewed. UDS: + for  Gabapentin, Oxycodone -consistent. + for  Cyclobenzaprine -consistent  Last seen: 11/8/2021.  Follow-up: 1/17/2022

## 2021-12-09 NOTE — TELEPHONE ENCOUNTER
From: Asuncion Riedel  To:  Tatiana Haynes  Sent: 12/8/2021 5:32 PM EST  Subject: Prescription refill    Good afternoon Tay Vasquez this is Era Tez ding and I would like to get a prescription refill for my Percocet 10/325 thank you

## 2021-12-13 ENCOUNTER — TELEPHONE (OUTPATIENT)
Dept: WOUND CARE | Age: 61
End: 2021-12-13

## 2021-12-22 DIAGNOSIS — I50.32 CHF (CONGESTIVE HEART FAILURE), NYHA CLASS II, CHRONIC, DIASTOLIC (HCC): Primary | ICD-10-CM

## 2021-12-22 PROCEDURE — 93264 REM MNTR WRLS P-ART PRS SNR: CPT | Performed by: NURSE PRACTITIONER

## 2021-12-23 ENCOUNTER — HOSPITAL ENCOUNTER (OUTPATIENT)
Dept: WOUND CARE | Age: 61
Discharge: HOME | End: 2021-12-23
Payer: MEDICARE

## 2021-12-23 VITALS
HEART RATE: 86 BPM | DIASTOLIC BLOOD PRESSURE: 64 MMHG | SYSTOLIC BLOOD PRESSURE: 128 MMHG | TEMPERATURE: 98.1 F | RESPIRATION RATE: 18 BRPM | OXYGEN SATURATION: 95 %

## 2021-12-23 DIAGNOSIS — T81.89XD NON-HEALING SURGICAL WOUND, SUBSEQUENT ENCOUNTER: ICD-10-CM

## 2021-12-23 PROCEDURE — 99212 OFFICE O/P EST SF 10 MIN: CPT | Performed by: NURSE PRACTITIONER

## 2021-12-23 PROCEDURE — 99213 OFFICE O/P EST LOW 20 MIN: CPT

## 2021-12-23 NOTE — PROGRESS NOTES
[facility]  Consult and Procedure Note      Patricia Noguera  MEDICAL RECORD NUMBER:  [id number]  AGE: 63 y.o. GENDER: male  : 1960  EPISODE DATE:  2021    SUBJECTIVE:     Chief Complaint   Patient presents with    Wound Check     abdomen         HISTORY OF PRESENT ILLNESS      Patricia Noguera is a 63 y.o. male who presents today for evaluation of nonhealing abdominal wound. Patient has history of abdominal exploration, lysis of adhesions and right colon resection 2018 that resulted in cutting through mesh placed previously with hernia repairs. Patient states that several months ago he developed plastic strand from prior incision line which is reported to have fallen off after rubbing at it repeatedly. Shortly after this patient noted wound with drainage. He was evaluated by Dr. Melvi Beckham who discussed concerns for possible infection of mesh. He reportedly is a poor candidate for surgery due to complex medical history and extensive nature of surgery required for removal of mesh. Patient and Dr. Melvi Beckham opted for conservative management at this time as patient notes minimal associated symptoms. Current ordered wound care includes alginate and gauze dressings changed daily. He notes ongoing drainage from wound, mostly unchanged from last visit. He denies pain, stating that he has minimal sensation to area since surgery. Denies any further needs or concerns. PAST MEDICAL HISTORY             Diagnosis Date    Arthritis     Back problem     back pain-sees [facility] pain mgmt    Bladder disease     Dr. Lena Taylor CHF with unknown LVEF ([facility] [address] 2021    Dr. George/CHF clinic    Constipation     Diabetes mellitus ([facility] [address]     Dr. Wendi Khan Hypertension     Hypertensive emergency 2019    Hypertensive urgency 2019    Sleep apnea     has bipap-sees AZRA Verma CNP    Thyroid disease        PAST SURGICAL HISTORY     Past Surgical History:   Procedure Laterality Date    ABDOMEN SURGERY      APPENDECTOMY      CARDIAC CATHETERIZATION      no stents    CARPAL TUNNEL RELEASE Bilateral     COLONOSCOPY  2017    Dr. Moses Klein, ESOPHAGUS     [address] [facility]      x3 surgeries with 14 repairs    KNEE SURGERY Right 1980's    cartilage    UT EXPLORATORY OF ABDOMEN N/A 2/5/2018    ABDOMINAL EXPLORATION WITH LYSIS OF COMPLICATED ADHESIONS WITH AN EXTENDED RIGHT COLON RESECTION performed by Janiya Curry MD at [address] [place] [address] [address] HISTORY     Family History   Problem Relation Age of Onset    Cancer Mother         breast    Heart Disease Father         bladder, lung    Cancer Father     Stroke Brother     Heart Attack Brother     Prostate Cancer Brother     Diabetes Paternal Grandmother     Arthritis Brother     High Blood Pressure Neg Hx        SOCIAL HISTORY     Social History     Tobacco Use    Smoking status: Former Smoker    Smokeless tobacco: Current User     Types: Chew    Tobacco comment: quit pipe over 30 yrs ago   Vaping Use    Vaping Use: Not on file   Substance Use Topics    Alcohol use: No     Alcohol/week: 0.0 standard drinks     Comment: quit    Drug use: No       ALLERGIES     Allergies   Allergen Reactions    Shellfish-Derived Products Swelling     Tolerates IV dye without any problems      Pcn [Penicillins] Itching    Succinylcholine      Pseudocholinesterase Deficiency    Sulfa Antibiotics Itching    Morphine Nausea And Vomiting     \"head swimming, skin crawling\"    Tape [Adhesive Tape] Rash       MEDICATIONS     Current Outpatient Medications on File Prior to Encounter   Medication Sig Dispense Refill    oxyCODONE-acetaminophen (PERCOCET)  MG per tablet Take 1 tablet by mouth every 8 hours as needed for Pain for up to 30 days.  Intended supply: 30 days 90 tablet 0    rOPINIRole (REQUIP) 1 MG tablet Take 1 pill in afternoon and 1 at bedtime 180 tablet 1    doxepin (SINEQUAN) 25 MG capsule Take 1 capsule by mouth nightly 30 capsule 3    ibuprofen (ADVIL;MOTRIN) 200 MG tablet Take 400 mg by mouth every 6 hours as needed for Pain      aspirin EC 81 MG EC tablet Take 1 tablet by mouth daily 90 tablet 1    sacubitril-valsartan (ENTRESTO) 49-51 MG per tablet Take 1 tablet by mouth 2 times daily 60 tablet 11    bumetanide (BUMEX) 1 MG tablet Take 1 tablet by mouth daily AND 1 tablet as needed. 60 tablet 6    cloNIDine (CATAPRES) 0.2 MG tablet Take 1 tablet by mouth 3 times daily      JANUVIA 100 MG tablet Take 1 tablet by mouth daily      amLODIPine (NORVASC) 10 MG tablet Take 1 tablet by mouth daily      spironolactone (ALDACTONE) 50 MG tablet Take 1 tablet by mouth 2 times daily 180 tablet 3    hydrALAZINE (APRESOLINE) 100 MG tablet Take 1 tablet by mouth every 8 hours 270 tablet 3    Fluticasone furoate-vilanterol (BREO ELLIPTA) 200-25 MCG/INH AEPB inhaler Inhale 1 puff into the lungs daily 3 each 3    oxybutynin (DITROPAN XL) 10 MG extended release tablet Take 1 tablet by mouth daily 90 tablet 3    tamsulosin (FLOMAX) 0.4 MG capsule Take 1 capsule by mouth nightly 90 capsule 3    montelukast (SINGULAIR) 10 MG tablet Take 1 tablet by mouth daily 30 tablet 11    levothyroxine (SYNTHROID) 200 MCG tablet Take 1 tablet by mouth Daily 30 tablet 1    bismuth subsalicylate (PEPTO BISMOL) 262 MG/15ML suspension Take by mouth daily      glimepiride (AMARYL) 4 MG tablet Take 8 mg by mouth daily       carvedilol (COREG) 25 MG tablet Take 1 tablet by mouth 2 times daily 180 tablet 3    acetaminophen (TYLENOL) 500 MG tablet Take 1 tablet by mouth every 6 hours as needed for Pain 40 tablet 0    cyclobenzaprine (FLEXERIL) 10 MG tablet Take 10 mg by mouth 3 times daily as needed for Muscle spasms      albuterol (PROVENTIL HFA;VENTOLIN HFA) 108 (90 BASE) MCG/ACT inhaler Inhale 1 puff into the lungs every 6 hours as needed for Wheezing.  azelastine (ASTELIN) 137 MCG/SPRAY nasal spray 1 spray by Nasal route as needed.  Use in each nostril as directed      gabapentin (NEURONTIN) 300 MG capsule Take 1 capsule by mouth nightly for 30 days. 30 capsule 0    nitroGLYCERIN (NITROSTAT) 0.4 MG SL tablet Place 1 tablet under the tongue every 5 minutes as needed for Chest pain (up to 3 doses) 25 tablet 3     No current facility-administered medications on file prior to encounter. REVIEW OF SYSTEMS:     Constitutional: Denies fever, chills, night sweats, fatigue, weight loss/gain, loss of appetite   Head: Denies headache,  dizziness, loss of consciousness  Mouth/throat: Denies ulceration, dental caries , dysphagia  Respiratory: Denies shortness of breath, cough, wheezing, dyspnea with exertion  Cardiovascular:Denies chest pain, palpitations, edema  Gastrointestinal: Denies nausea, vomiting, constipation, diarrhea, abdominal pain   ZHENG: Denies dysuria, frequency, urgency, hematuria  Musculoskeletal: Denies joint pain, swelling , stiffness,  Endocrine: Denies polyuria, polydipsia, cold or heat intolerance  Hematology: Denies easy brusing or bleeding, hx of clotting disorder  Dermatology: +wound Denies skin rash, eczema, pruritis    PHYSICAL EXAM:     Temp 98.1 °F (36.7 °C) (Infrared)   Wt Readings from Last 3 Encounters:   11/08/21 (!) 348 lb (157.9 kg)   10/13/21 (!) 348 lb (157.9 kg)   09/29/21 (!) 348 lb (157.9 kg)       General:  Awake, alert, no apparent distress. Appears stated age  [de-identified]: conjuctivae are clear without exudate or hemorrhage, anicteric sclera, moist oral mucosa. Chest:  Respirations regular, non-labored. Chest rise and fall equal bilaterally. Abdomen:  Soft, non tender to palpation, obese, surgical scarring  Neurological: Awake, alert, oriented x4  Psychiatric:  Appropriate mood and affect  Extremities: non-traumatic in appearance. Skin:  Warm and dry  Wound:    Midline abdomen wound bed shows exposed mesh with moderate amounts of serosanguinous drainage. Periwound skin is dry with blanchable erythema.   No warmth noted. Wound 09/22/21 Abdomen (Active)   Wound Image   12/23/21 1318   Wound Etiology Non-Healing Surgical 12/23/21 1318   Dressing Status Intact; Old drainage noted 12/23/21 1318   Wound Cleansed Cleansed with saline 12/23/21 1318   Dressing/Treatment Alginate with Ag;Dry dressing 11/11/21 1322   Offloading for Diabetic Foot Ulcers No offloading required 11/11/21 1322   Wound Length (cm) 0.2 cm 12/23/21 1318   Wound Width (cm) 0.3 cm 12/23/21 1318   Wound Depth (cm) 0.8 cm 12/23/21 1318   Wound Surface Area (cm^2) 0.06 cm^2 12/23/21 1318   Change in Wound Size % (l*w) 76 12/23/21 1318   Wound Volume (cm^3) 0.048 cm^3 12/23/21 1318   Wound Healing % 73 12/23/21 1318   Tunneling Position ___ O'Clock 0 12/23/21 1318   Undermining Starts ___ O'Clock 12 12/23/21 1318   Undermining Ends___ O'Clock 12 12/23/21 1318   Undermining Maxium Distance (cm) 1.7 12/23/21 1318   Wound Assessment Granulation tissue;Slough; Exposed mesh 12/23/21 1318   Drainage Amount Moderate 12/23/21 1318   Drainage Description Serosanguinous 12/23/21 1318   Odor None 12/23/21 1318   Zoë-wound Assessment Intact;Dry/flaky; Hyperpigmented 12/23/21 1318   Margins Unattached edges 12/23/21 1318   Wound Thickness Description not for Pressure Injury Full thickness 12/23/21 1318   Number of days: 92         LABS/IMAGING     UA: No results for input(s): SPECGRAV, PHUR, COLORU, CLARITYU, MUCUS, PROTEINU, BLOODU, RBCUA, WBCUA, BACTERIA, NITRU, GLUCOSEU, BILIRUBINUR, UROBILINOGEN, KETUA, LABCAST, LABCASTTY, AMORPHOS in the last 72 hours.     Invalid input(s): CRYSTALS  Micro:   Lab Results   Component Value Date    BC No growth-preliminary No growth  03/02/2021     ASSESSMENT     -Nonhealing surgical wound    Ulcer Identification:  Ulcer Type: non-healing surgical    Depth of Diabetic/Pressure/Non Pressure Ulcers or Wound:  Wound, full thickness     Patient Active Problem List   Diagnosis Code    Allergy to bee sting Z91.030    Obesity, Class III, BMI 40-49.9 (morbid obesity) ([facility] [address] E66.01    Weight gain R63.5    Essential hypertension I10    Rheumatoid arteritis (HCC) M05.20    Hypothyroidism E03.9    Gastroenteritis K52.9    Obstructive sleep apnea on CPAP G47.33, Z99.89    Small intestinal bacterial overgrowth K63.89    Acute diarrhea R19.7    Generalized abdominal pain R10.84    Nausea and vomiting R11.2    Hepatomegaly R16.0    Ileus (HCC) K56.7    Hypokalemia E87.6    S/P partial resection of colon Z90.49    S/P laparotomy Z98.890    Acute kidney failure with tubular necrosis (HCC) N17.0    Hypernatremia E87.0    Serum potassium decreased E87.6    Other headache syndrome G44.89    RLS (restless legs syndrome) G25.81    Psychophysiological insomnia F51.04    Angina, class III (HCC) I20.9    Acute respiratory failure with hypoxia ([facility]) J96.01    Dyspnea R06.00    CHF (NYHA class III, ACC/AHA stage C) ([facility]) I50.9    Nonhealing surgical wound T81.89XA    Type 2 diabetes mellitus, without long-term current use of insulin ([facility] [address] E11.9       PLAN     Patient examined and evaluated. All available lab work, radiology studies, and progress notes pertaining to [facility] reviewed prior to or during patient visit today.    -Nonhealing surgical wound, abdomen- Wound continues, mostly unchanged from last visit. Discussed with patient that wound is unlikely to heal with exposed mesh. As surgery is high risk, goals of wound at this point are prevention of infection and control of drainage. Continue wound care as follows: Abdomen- Apply silver alginate to wound bed, cover with a gauze secure with pink hytape. Change every day    -Wound does not appear to be infected at today's visit. Continue Chlorhexidine gluconate wash 2-3 times weekly. Reinforced education on signs and symptoms of infection. Advised patient to call clinic or seek emergency care should these occur. Follow up 6 weeks.   Call clinic with any needs or concerns prior to scheduled visit. All questions and concerns addressed prior to discharge from today's visit. Please see attached Discharge Instructions    Written patient dismissal instructions given to patient and signed by patient or POA. Discharge Instructions     Discharge Instructions       Visit Discharge/Physician Orders:  -Dressing supplies ordered through [facility] call them to reorder [phone]  -Okay to shower and cleanse with Tri-Hex soap a few times a week to decrease bacteria.      Wound Location: Abdomen     Dressing orders:      1) Gather wound care supplies and arrange on clean table.      2) Wash your hands with soap and water or use alcohol based hand  for 20 seconds (sing \"Happy Birthday\" twice).    3) Cleanse wounds with normal saline or wound cleanser and gauze. Pat dry with clean gauze., Apply a skin prep to skin around the wound     4) Abdomen- Apply zinc oxide to the skin around the wound then apply silver alginate to wound bed, cover with a gauze and tape with pink hytape. Change every day     Keep all dressings clean & dry.     Follow up visit: 6 weeks 02/03/2022 at 1:00pm     Keep next scheduled appointment. Please give 24 hour notice if unable to keep appointment. [phone]     If you experience any of the following, please call the Wound Care Service during business hours: Monday through Friday 8:00 am - 4:30 pm  ([phone]).               *Increase in pain              *Temperature over 101              *Increase in drainage from your wound or a foul odor              *Uncontrolled swelling              *Need for compression bandage changes due to slippage, breakthrough drainage     If you need medical attention outside of business hours, please contact your Primary Care Doctor or go to the nearest emergency room.         Electronically signed by SAHIL Terry CNP on 12/23/2021 at 1:33 PM

## 2021-12-23 NOTE — PLAN OF CARE
Problem: Wound:  Goal: Will show signs of wound healing; wound closure and no evidence of infection  Description: Will show signs of wound healing; wound closure and no evidence of infection  Outcome: Ongoing   Pt. Seen today for abdomen wound see AVS for new orders. Follow up in 6 weeks. Care plan reviewed with patient. Patient verbalize understanding of the plan of care and contribute to goal setting.

## 2021-12-28 ENCOUNTER — OFFICE VISIT (OUTPATIENT)
Dept: PULMONOLOGY | Age: 61
End: 2021-12-28
Payer: MEDICARE

## 2021-12-28 VITALS
BODY MASS INDEX: 41.75 KG/M2 | SYSTOLIC BLOOD PRESSURE: 130 MMHG | WEIGHT: 315 LBS | HEIGHT: 73 IN | DIASTOLIC BLOOD PRESSURE: 74 MMHG | HEART RATE: 91 BPM | OXYGEN SATURATION: 96 % | TEMPERATURE: 97 F

## 2021-12-28 DIAGNOSIS — G47.33 OSA TREATED WITH BIPAP: Primary | ICD-10-CM

## 2021-12-28 DIAGNOSIS — G47.09 OTHER INSOMNIA: ICD-10-CM

## 2021-12-28 DIAGNOSIS — E66.01 MORBID OBESITY WITH BMI OF 40.0-44.9, ADULT (HCC): ICD-10-CM

## 2021-12-28 PROCEDURE — G8427 DOCREV CUR MEDS BY ELIG CLIN: HCPCS | Performed by: PHYSICIAN ASSISTANT

## 2021-12-28 PROCEDURE — 99214 OFFICE O/P EST MOD 30 MIN: CPT | Performed by: PHYSICIAN ASSISTANT

## 2021-12-28 PROCEDURE — 3017F COLORECTAL CA SCREEN DOC REV: CPT | Performed by: PHYSICIAN ASSISTANT

## 2021-12-28 PROCEDURE — G8484 FLU IMMUNIZE NO ADMIN: HCPCS | Performed by: PHYSICIAN ASSISTANT

## 2021-12-28 PROCEDURE — 4004F PT TOBACCO SCREEN RCVD TLK: CPT | Performed by: PHYSICIAN ASSISTANT

## 2021-12-28 PROCEDURE — G8417 CALC BMI ABV UP PARAM F/U: HCPCS | Performed by: PHYSICIAN ASSISTANT

## 2021-12-28 RX ORDER — DOXEPIN HYDROCHLORIDE 25 MG/1
50 CAPSULE ORAL NIGHTLY
Qty: 60 CAPSULE | Refills: 3 | Status: SHIPPED | OUTPATIENT
Start: 2021-12-28 | End: 2022-06-13

## 2021-12-28 RX ORDER — ROPINIROLE 1 MG/1
TABLET, FILM COATED ORAL
Qty: 270 TABLET | Refills: 1 | Status: ON HOLD | OUTPATIENT
Start: 2021-12-28 | End: 2022-08-17 | Stop reason: HOSPADM

## 2021-12-28 ASSESSMENT — ENCOUNTER SYMPTOMS
WHEEZING: 0
ALLERGIC/IMMUNOLOGIC NEGATIVE: 1
SHORTNESS OF BREATH: 0
CHEST TIGHTNESS: 0
STRIDOR: 0
NAUSEA: 0
BACK PAIN: 1
DIARRHEA: 0
COUGH: 0
EYES NEGATIVE: 1

## 2021-12-28 NOTE — PROGRESS NOTES
[facility]      Yolanda Silvestre         [id number]  12/28/2021   Chief Complaint   Patient presents with    3 Month Follow-Up     LAURIE bipap with download         Pt of stefanie CANTU Download:   Original or initial AHI: 49.0     Date of initial study: 9/12/2015      Compliant  83%     Noncompliant 17 %     PAP Type Aircurve 10 Vauto Level  18/14   Avg Hrs/Day 7 hr 46 min  AHI: 0.5   Recorded compliance dates , 11/27/2021  to 12/26/2021   Machine/Mfg:   [x] ResMed    [] Respironics/Dreamstation   Interface:   [] Nasal    [] Nasal pillows   [x] FFM      Provider:      [x] [facility]     []Seferino     [] Verna    [] Donavan Hager    [] Schwiet[place]ans               [] [facility]      [] Adaptive    [] [facility]:      [] Other    Neck Size: 20.75  Mallampati Mallampati 4  ESS:  12  SAQLI: 31    Here is a scan of the most recent download:              Presentation:   Mal Givens presents for sleep medicine follow up for obstructive sleep apnea  Since the last visit, Mal Givens is doing ok with PAP. He states the mask is sliding up on his face. He is taking Doxepin for insomnia with limited benefit. He is on Requip and gabapentin for RLS with some benefit. Equipment issues: The pressure is  acceptable, the mask is acceptable     Sleep issues:  Do you feel better? No  More rested? No   Better concentration? no    Progress History:   Since last visit any new medical issues? No  New ER or hospital visits? No  Any new or changes in medicines? No  Any new sleep medicines? No    Review of Systems -   Review of Systems   Constitutional: Negative for activity change, appetite change, chills and fever. HENT: Negative for congestion and postnasal drip. Eyes: Negative. Respiratory: Negative for cough, chest tightness, shortness of breath, wheezing and stridor. Cardiovascular: Negative for chest pain and leg swelling. Gastrointestinal: Negative for diarrhea and nausea.    Endocrine: Negative. Genitourinary: Negative. Musculoskeletal: Positive for arthralgias and back pain. Skin: Negative. Allergic/Immunologic: Negative. Neurological: Negative. Negative for dizziness and light-headedness. Psychiatric/Behavioral: Negative. All other systems reviewed and are negative. Physical Exam:    BMI:  Body mass index is 44.62 kg/m². Wt Readings from Last 3 Encounters:   12/28/21 (!) 338 lb 3.2 oz (153.4 kg)   11/08/21 (!) 348 lb (157.9 kg)   10/13/21 (!) 348 lb (157.9 kg)     Weight lost 10 lbs over 2 months  Vitals: /74 (Site: Left Upper Arm, Position: Sitting, Cuff Size: Large Adult)   Pulse 91   Temp 97 °F (36.1 °C) (Temporal)   Ht 6' 1\" (1.854 m)   Wt (!) 338 lb 3.2 oz (153.4 kg)   SpO2 96%   BMI 44.62 kg/m²       Physical Exam  Constitutional:       Appearance: He is well-developed. HENT:      Head: Normocephalic and atraumatic. Right Ear: External ear normal.      Left Ear: External ear normal.   Eyes:      Conjunctiva/sclera: Conjunctivae normal.      Pupils: Pupils are equal, round, and reactive to light. Cardiovascular:      Rate and Rhythm: Normal rate and regular rhythm. Heart sounds: Normal heart sounds. Pulmonary:      Effort: Pulmonary effort is normal.      Breath sounds: Normal breath sounds. Musculoskeletal:         General: Normal range of motion. Cervical back: Normal range of motion and neck supple. Skin:     General: Skin is warm and dry. Neurological:      Mental Status: He is alert and oriented to person, place, and time. Psychiatric:         Behavior: Behavior normal.         Thought Content: Thought content normal.         Judgment: Judgment normal.           ASSESSMENT/DIAGNOSIS     Diagnosis Orders   1. LAURIE treated with BiPAP     2. Other insomnia     3. Morbid obesity with BMI of 40.0-44.9, adult [facility])            Plan   Do you need any equipment today?  No  - Will increase Requip to 1 tab in afternoon and 2 at bedtime  - Will increase Doxepin to 2 tabs at bedtime.   - Will decrease pressure to try to improve comfort  - Download reviewed and discussed with patient  - He  was advised to continue current positive airway pressure therapy with above described pressure. - He  advised to keep good compliance with current recommended pressure to get optimal results and clinical improvement  - Recommend 7-9 hours of sleep with PAP  - He was advised to call [employer] regarding supplies if needed.   -He call my office for earlier appointment if needed for worsening of sleep symptoms.   - He was instructed on weight loss  - Carl Tolentino was educated about my impression and plan. Patient verbalizesunderstanding.   We will see Naomi Dominguez back in: 3 months with download    Information added by my medical assistant/LPN was reviewed today        Dina Tee PA-C, MPAS  12/28/2021

## 2022-01-10 DIAGNOSIS — G89.4 CHRONIC PAIN SYNDROME: ICD-10-CM

## 2022-01-10 RX ORDER — OXYCODONE AND ACETAMINOPHEN 10; 325 MG/1; MG/1
1 TABLET ORAL EVERY 8 HOURS PRN
Qty: 90 TABLET | Refills: 0 | Status: SHIPPED | OUTPATIENT
Start: 2022-01-10 | End: 2022-02-08 | Stop reason: SDUPTHER

## 2022-01-10 NOTE — TELEPHONE ENCOUNTER
OARRS reviewed. UDS: + for  Oxycodone gabapentin flexeril consistent. Last seen: 11/8/2021.  Follow-up:   Future Appointments   Date Time Provider Ismael Haas   1/13/2022  2:00 PM Rosi Oswald MD [facility] [address]   1/17/2022 12:15 PM Monica Brumfield APRN - CNP [facility] [address]   2/3/2022  1:00 PM Jerald Salvador, APRN -  [address] [facility]   2/15/2022  2:00 PM Keo Asif APRN - CNP [facility]   3/28/2022  2:30 PM Tejinder Chakraborty [address]   4/12/2022 12:45 PM Geroge Gaucher, MD [address] [facility]   4/14/2022 11:30 AM Jose Arreola APRN - CNP [facility] CHF [address]   7/22/2022 11:20 AM Dominic Ness MD [facility], [facility]. [address]

## 2022-01-13 ENCOUNTER — OFFICE VISIT (OUTPATIENT)
Dept: CARDIOLOGY CLINIC | Age: 62
End: 2022-01-13
Payer: MEDICARE

## 2022-01-13 VITALS
WEIGHT: 315 LBS | SYSTOLIC BLOOD PRESSURE: 108 MMHG | HEIGHT: 73 IN | DIASTOLIC BLOOD PRESSURE: 60 MMHG | HEART RATE: 88 BPM | BODY MASS INDEX: 41.75 KG/M2

## 2022-01-13 DIAGNOSIS — I50.32 CHRONIC DIASTOLIC CONGESTIVE HEART FAILURE (HCC): Primary | ICD-10-CM

## 2022-01-13 PROCEDURE — G8428 CUR MEDS NOT DOCUMENT: HCPCS | Performed by: INTERNAL MEDICINE

## 2022-01-13 PROCEDURE — G8417 CALC BMI ABV UP PARAM F/U: HCPCS | Performed by: INTERNAL MEDICINE

## 2022-01-13 PROCEDURE — 4004F PT TOBACCO SCREEN RCVD TLK: CPT | Performed by: INTERNAL MEDICINE

## 2022-01-13 PROCEDURE — G8484 FLU IMMUNIZE NO ADMIN: HCPCS | Performed by: INTERNAL MEDICINE

## 2022-01-13 PROCEDURE — 3017F COLORECTAL CA SCREEN DOC REV: CPT | Performed by: INTERNAL MEDICINE

## 2022-01-13 PROCEDURE — 99213 OFFICE O/P EST LOW 20 MIN: CPT | Performed by: INTERNAL MEDICINE

## 2022-01-13 NOTE — PROGRESS NOTES
by mouth daily AND 1 tablet as needed. , Disp: 60 tablet, Rfl: 6    cloNIDine (CATAPRES) 0.2 MG tablet, Take 1 tablet by mouth 3 times daily, Disp: , Rfl:     JANUVIA 100 MG tablet, Take 1 tablet by mouth daily, Disp: , Rfl:     amLODIPine (NORVASC) 10 MG tablet, Take 1 tablet by mouth daily, Disp: , Rfl:     spironolactone (ALDACTONE) 50 MG tablet, Take 1 tablet by mouth 2 times daily, Disp: 180 tablet, Rfl: 3    hydrALAZINE (APRESOLINE) 100 MG tablet, Take 1 tablet by mouth every 8 hours, Disp: 270 tablet, Rfl: 3    nitroGLYCERIN (NITROSTAT) 0.4 MG SL tablet, Place 1 tablet under the tongue every 5 minutes as needed for Chest pain (up to 3 doses), Disp: 25 tablet, Rfl: 3    Fluticasone furoate-vilanterol (BREO ELLIPTA) 200-25 MCG/INH AEPB inhaler, Inhale 1 puff into the lungs daily, Disp: 3 each, Rfl: 3    oxybutynin (DITROPAN XL) 10 MG extended release tablet, Take 1 tablet by mouth daily, Disp: 90 tablet, Rfl: 3    tamsulosin (FLOMAX) 0.4 MG capsule, Take 1 capsule by mouth nightly, Disp: 90 capsule, Rfl: 3    montelukast (SINGULAIR) 10 MG tablet, Take 1 tablet by mouth daily, Disp: 30 tablet, Rfl: 11    levothyroxine (SYNTHROID) 200 MCG tablet, Take 1 tablet by mouth Daily, Disp: 30 tablet, Rfl: 1    bismuth subsalicylate (PEPTO BISMOL) 262 MG/15ML suspension, Take by mouth daily, Disp: , Rfl:     glimepiride (AMARYL) 4 MG tablet, Take 8 mg by mouth daily , Disp: , Rfl:     carvedilol (COREG) 25 MG tablet, Take 1 tablet by mouth 2 times daily, Disp: 180 tablet, Rfl: 3    acetaminophen (TYLENOL) 500 MG tablet, Take 1 tablet by mouth every 6 hours as needed for Pain, Disp: 40 tablet, Rfl: 0    cyclobenzaprine (FLEXERIL) 10 MG tablet, Take 10 mg by mouth 3 times daily as needed for Muscle spasms, Disp: , Rfl:     albuterol (PROVENTIL HFA;VENTOLIN HFA) 108 (90 BASE) MCG/ACT inhaler, Inhale 1 puff into the lungs every 6 hours as needed for Wheezing., Disp: , Rfl:     azelastine (ASTELIN) 137 MCG/SPRAY nasal spray, 1 spray by Nasal route as needed. Use in each nostril as directed, Disp: , Rfl:     gabapentin (NEURONTIN) 300 MG capsule, Take 1 capsule by mouth nightly for 30 days. , Disp: 30 capsule, Rfl: 0    Past Medical History  Aleksey Rogel  has a past medical history of Arthritis, Back problem, Bladder disease, CHF with unknown LVEF ([address] Constipation, Diabetes mellitus ([address] Hypertension, Hypertensive emergency, Hypertensive urgency, Sleep apnea, and Thyroid disease. Social History  Aleksey Rogel  reports that he has quit smoking. His smokeless tobacco use includes chew. He reports that he does not drink alcohol and does not use drugs. Family History  Aleksey Rogel family history includes Arthritis in his brother; Cancer in his father and mother; Diabetes in his paternal grandmother; Heart Attack in his brother; Heart Disease in his father; Prostate Cancer in his brother; Stroke in his brother. Past Surgical History   Past Surgical History:   Procedure Laterality Date    ABDOMEN SURGERY      APPENDECTOMY      CARDIAC CATHETERIZATION      no stents    CARPAL TUNNEL RELEASE Bilateral     COLONOSCOPY  2017    Dr. Flores Clarke, ESOPHAGUS     [address] [facility]      x3 surgeries with 14 repairs    KNEE SURGERY Right 1980's    cartilage    ND EXPLORATORY OF ABDOMEN N/A 2/5/2018    ABDOMINAL EXPLORATION WITH LYSIS OF COMPLICATED ADHESIONS WITH AN EXTENDED RIGHT COLON RESECTION performed by Linda Michelle MD at [address] [facility] [address]:     REVIEW OF SYSTEMS  Constitutional: denies sweats, chills and fever  HENT: denies  congestion, sinus pressure, sneezing and sore throat. Eyes: denies  pain, discharge, redness and itching. Respiratory: denies apnea, cough  Gastrointestinal: denies blood in stool, constipation, diarrhea   Endocrine: denies cold intolerance, heat intolerance, polydipsia. Genitourinary: denies dysuria, enuresis, flank pain and hematuria.    Musculoskeletal: denies arthralgias, ALKPHOS 76 04/15/2021    ALT 47 04/15/2021    AST 41 04/15/2021    PROT 8.3 04/15/2021    BILITOT 0.5 04/15/2021    BILIDIR <0.2 03/02/2021    LABALBU 4.4 04/15/2021       Lab Results   Component Value Date    MG 2.1 07/21/2021       Lab Results   Component Value Date    INR 1.14 (H) 06/16/2021    INR 1.06 12/01/2020    INR 1.03 07/02/2019         Lab Results   Component Value Date    LABA1C 9.3 05/24/2021       Lab Results   Component Value Date    TRIG 107 05/25/2021    HDL 35 05/25/2021    LDLCALC 96 05/25/2021       Lab Results   Component Value Date    TSH 3.090 04/15/2021         Testing Reviewed:      I haveindividually reviewed the below cardiac tests    EKG:    ECHO:   Results for orders placed during the hospital encounter of 04/26/19   ECHO Complete 2D W Doppler W Color    Narrative Transthoracic Echocardiography Report (TTE)     Demographics      Patient Name   Tory Carnes  Gender              Male                  A      MR #           [id number]       Race                                                     Ethnicity      Account #      [de-identified]       Room Number      Accession      [id number]       Date of Study       04/26/2019   Number      Date of Birth  1960      Referring Physician Genevieve Ruiz DO      Age            66 year(s)      Alise Garcia                                                      [facility]                                     Interpreting        Candida Sauer MD                                  Physician     Procedure    Type of Study      TTE procedure:ECHOCARDIOGRAM COMPLETE 2D W DOPPLER W COLOR. Procedure Date  Date: 04/26/2019 Start: 07:22 AM    Study Location: Echo Lab  Technical Quality: Poor visualization due to body habitus. Indications:Dyspnea on exertion.     Additional Medical History:Sleep apnea, hypertension, hypothyroidism    Patient Status: Routine    Height: 73 inches Weight: 352.01 pounds BSA: 2.74 m^2 BMI: 46.44 kg/m^2    BP: 182/92 mmHg     Conclusions      Summary   Technically difficult examination. Left ventricular size is normal and systolic function is mildly reduced. Ejection fraction was estimated at 40-45%. LV wall thickness is within   normal limits. The right ventricular size appears normal with normal systolic function   and wall thickness. Signature      ----------------------------------------------------------------   Electronically signed by Julieth Garcia MD (Interpreting   physician) on 04/26/2019 at 05:05 PM   ----------------------------------------------------------------      Findings      Mitral Valve   The mitral valve was not well visualized . DOPPLER: The transmitral velocity was within the normal range with no   evidence for mitral stenosis. There was no evidence of mitral   regurgitation. Aortic Valve   The aortic valve leaflets were not well visualized. DOPPLER: Transaortic velocity was within the normal range with no evidence   of aortic stenosis. There was no evidence of aortic regurgitation. Tricuspid Valve   Tricuspid valve was not well visualized. DOPPLER: There is no evidence of tricuspid stenosis. There was no evidence   of tricuspid regurgitation. Pulmonic Valve   The pulmonic valve was not well visualized . [facility] DOPPLER: The transpulmonic velocity was within the normal range. No   evidence for regurgitation. Left Atrium   Left atrial size is normal.      Left Ventricle   Left ventricular size is normal and systolic function is mildly reduced. Ejection fraction was estimated at 40-45%. LV wall thickness is within   normal limits. Right Atrium   Right atrial size was normal.      Right Ventricle   The right ventricular size appears normal with normal systolic function   and wall thickness.       Pericardial Effusion   The pericardium appears normal with no evidence of a pericardial effusion. Pleural Effusion   No evidence of pleural effusion. Aorta / Great Vessels   -Aortic root dimension within normal limits. -IVC size is within normal limits with normal respiratory phasic changes.      M-Mode/2D Measurements & Calculations      LV Diastolic   LV Systolic Dimension:    AV Cusp Separation: 2.3 cmLA   Dimension: 5.1 3.9 cm                    Dimension: 4.8 cmAO Root   cm             LV Volume Diastolic: 070  Dimension: 3.8 cmLA Area: 20.8   LV FS:23.5 %   ml                        cm^2   LV PW          LV Volume Systolic: 49.3   Diastolic: 1.4 ml   cm             LV EDV/LV EDV Index: 124   Septum         ml/45 m^2LV ESV/LV ESV    RV Diastolic Dimension: 3.4 cm   Diastolic: 1.4 Index: 14.3 ml/24 m^2   cm             EF Calculated: 46.9 %     LA/Aorta: 1.26                                            Ascending Aorta: 3.6 cm                                            LA volume/Index: 51.9 ml /19m^2     Doppler Measurements & Calculations      MV Peak E-Wave: 89.7  AV Peak Velocity: 155 LVOT Peak Velocity: 109 cm/s   cm/s                  cm/s                  LVOT Mean Velocity: 71.6 cm/s   MV Peak A-Wave: 81.9  AV Peak Gradient:     LVOT Peak Gradient: 5 mmHgLVOT   cm/s                  9.61 mmHg             Mean Gradient: 2 mmHg   MV E/A Ratio: 1.1     AV Mean Velocity:   MV Peak Gradient:     91.2 cm/s             TV Peak E-Wave: 29.8 cm/s   3.22 mmHg             AV Mean Gradient: 4   TV Peak A-Wave: 42.7 cm/s                         mmHg   MV Deceleration Time: AV VTI: 28.5 cm       TV Peak Gradient: 0.36 mmHg   268 msec   MV P1/2t: 78 msec                           PV Peak Velocity: 69.7 cm/s   MVA by PHT:2.82 cm^2  LVOT VTI: 24.8 cm     PV Peak Gradient: 1.94 mmHg                         IVRT: 95 msec   MV E' Septal   Velocity: 5.5 cm/s   MV A' Septal          AV DVI (VTI): 0.87AV   Velocity: 7.9 cm/s    DVI (Vmax):0.7   MV E' Lateral   Velocity: 9.4 cm/s   MV A' Lateral   Velocity: 11.3 cm/s   E/E' septal: 16.31   E/E' lateral: 9.54   MR Velocity: 373 cm/s     [email or web address] [id number]       STRESS:    CATH:    Assessment/Plan       Diagnosis Orders   1. Chronic diastolic congestive heart failure (HCC)       Chronic CHF exacerbation, s/p CardioMEMs insertion on 6/16/21  Cardiomyopathy, EF improved to 55-60%  Morbid Obesity  LAURIE on BiPAP  DM  HTN-mildly uncontrolled  Palpitations     Doing well  Dry weight 333 lbs  Continue DAPT  continue bumex / aldactone  toleratign well, no side effects  Has CHF clinic follow up  Advised to use the cardiomems pillow regularly  Might need BIPAP therapy  Continue Bumex for now   had umbilical hernia issues for which he see. Dr. Kevon Davison    Return in about 6 months (around 7/13/2022), or if symptoms worsen or fail to improve, for Review testing, Regular follow up.        Electronically signed by Juan Hoyos MD [facility]  1/13/2022 at 1:12 PM

## 2022-01-13 NOTE — PROGRESS NOTES
Pt here for 6 mo check up     Pt states med list is correct from My Chart,    Pt continues with sob , has open wound that has not healed where umbilical hernia was

## 2022-01-17 ENCOUNTER — OFFICE VISIT (OUTPATIENT)
Dept: PHYSICAL MEDICINE AND REHAB | Age: 62
End: 2022-01-17
Payer: MEDICARE

## 2022-01-17 VITALS
DIASTOLIC BLOOD PRESSURE: 84 MMHG | BODY MASS INDEX: 41.75 KG/M2 | WEIGHT: 315 LBS | SYSTOLIC BLOOD PRESSURE: 124 MMHG | HEIGHT: 73 IN

## 2022-01-17 DIAGNOSIS — G89.4 CHRONIC PAIN SYNDROME: ICD-10-CM

## 2022-01-17 DIAGNOSIS — M47.816 SPONDYLOSIS OF LUMBAR REGION WITHOUT MYELOPATHY OR RADICULOPATHY: ICD-10-CM

## 2022-01-17 DIAGNOSIS — G89.29 CHRONIC PAIN OF RIGHT KNEE: ICD-10-CM

## 2022-01-17 DIAGNOSIS — M25.561 CHRONIC PAIN OF RIGHT KNEE: ICD-10-CM

## 2022-01-17 DIAGNOSIS — M17.11 PRIMARY OSTEOARTHRITIS OF RIGHT KNEE: Primary | ICD-10-CM

## 2022-01-17 DIAGNOSIS — F11.90 CHRONIC, CONTINUOUS USE OF OPIOIDS: ICD-10-CM

## 2022-01-17 DIAGNOSIS — M54.17 LUMBOSACRAL RADICULITIS: ICD-10-CM

## 2022-01-17 DIAGNOSIS — M48.062 SPINAL STENOSIS OF LUMBAR REGION WITH NEUROGENIC CLAUDICATION: ICD-10-CM

## 2022-01-17 DIAGNOSIS — G62.9 NEUROPATHY: ICD-10-CM

## 2022-01-17 DIAGNOSIS — M47.816 LUMBAR FACET ARTHROPATHY: ICD-10-CM

## 2022-01-17 PROCEDURE — 99214 OFFICE O/P EST MOD 30 MIN: CPT | Performed by: NURSE PRACTITIONER

## 2022-01-17 PROCEDURE — G8427 DOCREV CUR MEDS BY ELIG CLIN: HCPCS | Performed by: NURSE PRACTITIONER

## 2022-01-17 PROCEDURE — 3017F COLORECTAL CA SCREEN DOC REV: CPT | Performed by: NURSE PRACTITIONER

## 2022-01-17 PROCEDURE — 4004F PT TOBACCO SCREEN RCVD TLK: CPT | Performed by: NURSE PRACTITIONER

## 2022-01-17 PROCEDURE — G8484 FLU IMMUNIZE NO ADMIN: HCPCS | Performed by: NURSE PRACTITIONER

## 2022-01-17 PROCEDURE — G8417 CALC BMI ABV UP PARAM F/U: HCPCS | Performed by: NURSE PRACTITIONER

## 2022-01-17 ASSESSMENT — ENCOUNTER SYMPTOMS
CHEST TIGHTNESS: 1
COUGH: 1
BACK PAIN: 1
STRIDOR: 0
WHEEZING: 0
SHORTNESS OF BREATH: 1

## 2022-01-17 NOTE — PROGRESS NOTES
[address] [facility] [address]  Dept: [phone]  Dept Fax: [phone]: [phone]    Visit Date: 1/17/2022    Functionality Assessment/Goals Worksheet     On a scale of 0 (Does not Interfere) to 10 (Completely Interferes)     1. Which number describes how during the past week pain has interfered with       the following:  A. General Activity:  8  B. Mood: 10  C. Walking Ability:  8  D. Normal Work (Includes both work outside the home and housework):  10  E. Relations with Other People:   9  F. Sleep:   8  G. Enjoyment of Life:   10    2. Patient Prefers to Take their Pain Medications:     [x]  On a regular basis   []  Only when necessary    []  Does not take pain medications    3. What are the Patient's Goals/Expectations for Visiting Pain Management? []  Learn about my pain    [x]  Receive Medication   []  Physical Therapy     []  Treat Depression   []  Receive Injections    []  Treat Sleep   []  Deal with Anxiety and Stress   []  Treat Opoid Dependence/Addiction   []  Other:      HPI:   Christy Becerra is a 63 y.o. male is here today for    Chief Complaint: Low back pain, knee pain     HPI   10 week FU. Continues to have pain in lower back radiating across lower back and down into buttocks/ SI area and down right lower extremity posteriorly to feet. Intremirrtent numbness in right leg. Pain remains most bothersome across low back- deep ache and stiffness     Continues to have joint pain- knee and hands most bothersome aching and stiff   Continues Percocet prn and Neurontin which is effective in decreasing pain to a tolerable level.    Pain increases with bending, lifting, twisting , standing and getting up and down, and cold weather changes     Does not feel like Flexeril is beneficial \"all it does is drys my mouth out\"     Has open abdominal wound and continues to follow with wound care   Medications reviewed. Patient denies side effects with medications. Patient states he is taking medications as prescribed. Hedenies receiving pain medications from other sources. He denies any ER visits since last visit. Pain scale with out pain medications or at its worst is 10/10. Pain scale with pain medications or at its best is 5-7/10. Last dose of Percocet was today and neurontin was yesterday   Drug screen reviewed from 11/8/2021 and was appropriate  Pill count completed  today and WNL: Yes      The patientis allergic to shellfish-derived products, pcn [penicillins], succinylcholine, sulfa antibiotics, morphine, and tape [adhesive tape]. Subjective:      Review of Systems   Constitutional: Positive for fatigue. Negative for fever. Respiratory: Positive for cough, chest tightness and shortness of breath. Negative for wheezing and stridor. Cardiovascular: Positive for leg swelling. Musculoskeletal: Positive for arthralgias, back pain, gait problem, joint swelling, myalgias and neck stiffness. Negative for neck pain. Skin: Negative. Neurological: Positive for weakness and numbness. Psychiatric/Behavioral: Negative. Objective:     Vitals:    01/17/22 1202   BP: 124/84   Weight: (!) 333 lb (151 kg)   Height: 6' 1\" (1.854 m)       Physical Exam  Constitutional:       General: He is not in acute distress. Appearance: Normal appearance. He is obese. He is not ill-appearing, toxic-appearing or diaphoretic. HENT:      Head: Normocephalic and atraumatic. Right Ear: External ear normal. There is no impacted cerumen. Left Ear: External ear normal. There is no impacted cerumen. Nose: Nose normal. No congestion or rhinorrhea. Mouth/Throat:      Mouth: Mucous membranes are moist.      Pharynx: Oropharynx is clear. Eyes:      Extraocular Movements: Extraocular movements intact.       Conjunctiva/sclera: Conjunctivae normal.      Pupils: Pupils are equal, round, and reactive to light. Cardiovascular:      Rate and Rhythm: Normal rate and regular rhythm. Pulses: Normal pulses. Heart sounds: Murmur heard. Pulmonary:      Effort: Pulmonary effort is normal. No respiratory distress. Breath sounds: Normal breath sounds. Abdominal:      General: Abdomen is flat. Bowel sounds are normal. There is no distension. Palpations: Abdomen is soft. There is no mass. Tenderness: There is no abdominal tenderness. Hernia: No hernia is present. Musculoskeletal:         General: Tenderness present. Right shoulder: Tenderness present. Decreased range of motion. Right wrist: Tenderness present. Decreased range of motion. Left wrist: Tenderness present. Decreased range of motion. Cervical back: Neck supple. Tenderness and bony tenderness present. Muscular tenderness present. Thoracic back: Bony tenderness present. Decreased range of motion. Lumbar back: Tenderness and bony tenderness present. Decreased range of motion. Positive right straight leg raise test and positive left straight leg raise test.        Back:       Right hip: Decreased range of motion. Decreased strength. Left hip: Decreased range of motion. Decreased strength. Right knee: Swelling present. Decreased range of motion. Tenderness present. Right lower leg: Edema present. Left lower leg: Edema present. Right ankle: Swelling present. Tenderness present. Decreased range of motion. Left ankle: Tenderness present. Skin:     General: Skin is warm and dry. Capillary Refill: Capillary refill takes less than 2 seconds. Neurological:      General: No focal deficit present. Mental Status: He is alert and oriented to person, place, and time. Sensory: Sensory deficit present. Motor: Weakness present. Coordination: Romberg sign positive.  Coordination abnormal.      Gait: Gait abnormal. Deep Tendon Reflexes:      Reflex Scores:       Tricep reflexes are 2+ on the right side and 2+ on the left side. Bicep reflexes are 2+ on the right side and 2+ on the left side. Brachioradialis reflexes are 2+ on the right side and 2+ on the left side. Patellar reflexes are 1+ on the right side and 1+ on the left side. Achilles reflexes are 1+ on the right side and 1+ on the left side. Comments: 4/5/strength bilateral lower extremity    Decreased sensation bilateral feet    Psychiatric:         Mood and Affect: Mood normal.         Behavior: Behavior normal.       SAUNDRA  Patricks test  positive  Yeoman's  positive  Gaenslen's  positive         Assessment:     1. Primary osteoarthritis of right knee    2. Chronic pain of right knee    3. Lumbosacral radiculitis    4. Spinal stenosis of lumbar region with neurogenic claudication    5. Spondylosis of lumbar region without myelopathy or radiculopathy    6. Lumbar facet arthropathy    7. Neuropathy    8. Chronic, continuous use of opioids    9. Chronic pain syndrome            Plan:      · OARRS reviewed. Current MED: 45.00  · Patient was offered naloxone for home. · Discussed long term side effects of medications, tolerance, dependency and addiction. · Previous UDS reviewed  · UDS preformed today for compliance. · Patient told can not receive any pain medications from any other source. · No evidence of abuse, diversion or aberrant behavior.  Medications and/or procedures to improve function and quality of life- patient understanding with this and that may not be pain free   Discussed with patient about safe storage of medications at home   Discussed possible weaning of medication dosing dependent on treatment/procedure results.  Discussed with patient about risks with procedure including infection, reaction to medication, increased pain, or bleeding.   · Reviewed L-Xray, L-MRI, and right knee xray again in detail   · Discussed right knee injection, L-facet ALEXY, JOSÉ LUIS in future. Currently not a candidate for procedures d/t open abdominal wound- follows with wound care   · Continue Percocet 10/325 TID prn - filled 1/10/2022  · Continue Neurontin 300 mg at bedtime- filled 11/26/2021 for 90 days  · UDS ordered today     Meds. Prescribed:   No orders of the defined types were placed in this encounter. Return in about 3 months (around 4/17/2022), or if symptoms worsen or fail to improve, for follow up  for medications.                Electronically signed by SAHIL Olguin CNP on1/17/2022 at 12:25 PM

## 2022-01-21 DIAGNOSIS — I50.32 CHF (CONGESTIVE HEART FAILURE), NYHA CLASS II, CHRONIC, DIASTOLIC (HCC): Primary | ICD-10-CM

## 2022-01-21 PROCEDURE — 93264 REM MNTR WRLS P-ART PRS SNR: CPT | Performed by: NURSE PRACTITIONER

## 2022-02-02 ENCOUNTER — TELEPHONE (OUTPATIENT)
Dept: WOUND CARE | Age: 62
End: 2022-02-02

## 2022-02-02 NOTE — TELEPHONE ENCOUNTER
Patient called to cancel his Thursday appointment due to weather. Patient states his wound is bigger and has increased drainage he is changing his dressings 2 times a day. Patient also mentioned he is moistening his alginate before he puts it on his wound. Patient informed not to moisten alginate. And continue with dressing changes and he may have to change it 2 times a day until his appointment 2/9. We will re evaluate then. Patient verbalizes understanding.

## 2022-02-08 ENCOUNTER — PATIENT MESSAGE (OUTPATIENT)
Dept: PHYSICAL MEDICINE AND REHAB | Age: 62
End: 2022-02-08

## 2022-02-08 DIAGNOSIS — G89.4 CHRONIC PAIN SYNDROME: ICD-10-CM

## 2022-02-08 RX ORDER — OXYCODONE AND ACETAMINOPHEN 10; 325 MG/1; MG/1
1 TABLET ORAL EVERY 8 HOURS PRN
Qty: 90 TABLET | Refills: 0 | Status: SHIPPED | OUTPATIENT
Start: 2022-02-12 | End: 2022-03-08 | Stop reason: SDUPTHER

## 2022-02-08 NOTE — TELEPHONE ENCOUNTER
OARRS reviewed. UDS: + for  Gabapentin, Oxycodone -consistent. + for  Cyclobenzaprine -inconsistent  Last seen: 1/17/2022.  Follow-up: 4/18/2022

## 2022-02-08 NOTE — TELEPHONE ENCOUNTER
From: Jayce Navas  To:  Ann Banks  Sent: 2/8/2022 1:36 PM EST  Subject: Percocet    Good afternoon MR Regla Eldridge  Could I please get a refill for my Percocet 10/325 thank you Amina Clifton

## 2022-02-09 ENCOUNTER — HOSPITAL ENCOUNTER (OUTPATIENT)
Dept: WOUND CARE | Age: 62
Discharge: HOME | End: 2022-02-09
Payer: MEDICARE

## 2022-02-09 VITALS
RESPIRATION RATE: 18 BRPM | DIASTOLIC BLOOD PRESSURE: 62 MMHG | SYSTOLIC BLOOD PRESSURE: 115 MMHG | OXYGEN SATURATION: 94 % | HEART RATE: 98 BPM | TEMPERATURE: 98.5 F

## 2022-02-09 DIAGNOSIS — T81.89XD NON-HEALING SURGICAL WOUND, SUBSEQUENT ENCOUNTER: ICD-10-CM

## 2022-02-09 PROCEDURE — 99213 OFFICE O/P EST LOW 20 MIN: CPT

## 2022-02-09 PROCEDURE — 99213 OFFICE O/P EST LOW 20 MIN: CPT | Performed by: NURSE PRACTITIONER

## 2022-02-09 NOTE — PROGRESS NOTES
[facility]  Consult and Procedure Note      Annie Avila  MEDICAL RECORD NUMBER:  [id number]  AGE: 63 y.o. GENDER: male  : 1960  EPISODE DATE:  2022    SUBJECTIVE:     Chief Complaint   Patient presents with    Wound Check     abdomen         HISTORY OF PRESENT ILLNESS      Annie Avila is a 63 y.o. male who presents today for evaluation of nonhealing abdominal wound. Patient has history of abdominal exploration, lysis of adhesions and right colon resection 2018 that resulted in cutting through mesh placed previously with hernia repairs. Patient since then developed plastic strand from prior incision line which is reported to have fallen off after rubbing at it repeatedly. Shortly after this patient noted wound with drainage. He was evaluated by Dr. Annabella Esteban who discussed concerns for possible infection of mesh. He reportedly is a poor candidate for surgery due to complex medical history and extensive nature of surgery required for removal of mesh. Patient and Dr. Annabella Esteban opted for conservative management at this time as patient notes minimal associated symptoms. Current ordered wound care includes alginate and gauze dressings changed daily. Patient reports increased size of wound and visible mesh from last visit. Voices frustration with ongoing problem. He denies pain, stating that he has minimal sensation to area since surgery. Denies fever, chills. Denies any further needs or concerns. PAST MEDICAL HISTORY             Diagnosis Date    Arthritis     Back problem     back pain-sees [facility] pain mgmt    Bladder disease     Dr. Cyndi Nicolas CHF with unknown LVEF ([facility] [address] 2021    Dr. George/CHF clinic    Constipation     Diabetes mellitus ([facility] [address]     Dr. Eduar Garcia Hypertension     Hypertensive emergency 2019    Hypertensive urgency 2019    Sleep apnea     has bipap-sees AZRA Olmedo CNP    Thyroid disease        PAST SURGICAL HISTORY Past Surgical History:   Procedure Laterality Date    ABDOMEN SURGERY      APPENDECTOMY      CARDIAC CATHETERIZATION      no stents    CARPAL TUNNEL RELEASE Bilateral     COLONOSCOPY  2017    Dr. Juan Pablo Phillips, ESOPHAGUS     [address] [facility]      x3 surgeries with 14 repairs    KNEE SURGERY Right 1980's    cartilage    [place] EXPLORATORY OF ABDOMEN N/A 2/5/2018    ABDOMINAL EXPLORATION WITH LYSIS OF COMPLICATED ADHESIONS WITH AN EXTENDED RIGHT COLON RESECTION performed by David Peres MD at [address] [place] [address] [address]kw[address] HISTORY     Family History   Problem Relation Age of Onset    Cancer Mother         breast    Heart Disease Father         bladder, lung    Cancer Father     Stroke Brother     Heart Attack Brother     Prostate Cancer Brother     Diabetes Paternal Grandmother     Arthritis Brother     High Blood Pressure Neg Hx        SOCIAL HISTORY     Social History     Tobacco Use    Smoking status: Former Smoker    Smokeless tobacco: Current User     Types: Chew    Tobacco comment: quit pipe over 30 yrs ago   Vaping Use    Vaping Use: Never used   Substance Use Topics    Alcohol use: No     Alcohol/week: 0.0 standard drinks     Comment: quit    Drug use: No       ALLERGIES     Allergies   Allergen Reactions    Shellfish-Derived Products Swelling     Tolerates IV dye without any problems      Pcn [Penicillins] Itching    Succinylcholine      Pseudocholinesterase Deficiency    Sulfa Antibiotics Itching    Morphine Nausea And Vomiting     \"head swimming, skin crawling\"    Tape [Adhesive Tape] Rash       MEDICATIONS     Current Outpatient Medications on File Prior to Encounter   Medication Sig Dispense Refill    [START ON 2/12/2022] oxyCODONE-acetaminophen (PERCOCET)  MG per tablet Take 1 tablet by mouth every 8 hours as needed for Pain for up to 30 days.  Intended supply: 30 days 90 tablet 0    CPAP Machine MISC by Does not apply route Please change bipap 17/13 cm H20. 1 each Inhale 1 puff into the lungs every 6 hours as needed for Wheezing.  azelastine (ASTELIN) 137 MCG/SPRAY nasal spray 1 spray by Nasal route as needed. Use in each nostril as directed      gabapentin (NEURONTIN) 300 MG capsule Take 1 capsule by mouth nightly for 30 days. 30 capsule 0    nitroGLYCERIN (NITROSTAT) 0.4 MG SL tablet Place 1 tablet under the tongue every 5 minutes as needed for Chest pain (up to 3 doses) 25 tablet 3     No current facility-administered medications on file prior to encounter. REVIEW OF SYSTEMS:     Constitutional: Denies fever, chills, night sweats, fatigue, weight loss/gain, loss of appetite   Head: Denies headache,  dizziness, loss of consciousness  Mouth/throat: Denies ulceration, dental caries , dysphagia  Respiratory: Denies shortness of breath, cough, wheezing, dyspnea with exertion  Cardiovascular:Denies chest pain, palpitations, edema  Gastrointestinal: Denies nausea, vomiting, constipation, diarrhea, abdominal pain   ZHENG: Denies dysuria, frequency, urgency, hematuria  Musculoskeletal: Denies joint pain, swelling , stiffness,  Endocrine: Denies polyuria, polydipsia, cold or heat intolerance  Hematology: Denies easy brusing or bleeding, hx of clotting disorder  Dermatology: +wound Denies skin rash, eczema, pruritis    PHYSICAL EXAM:     /62   Pulse 98   Temp 98.5 °F (36.9 °C) (Infrared)   Resp 18   SpO2 94%   Wt Readings from Last 3 Encounters:   01/17/22 (!) 333 lb (151 kg)   01/13/22 (!) 333 lb (151 kg)   12/28/21 (!) 338 lb 3.2 oz (153.4 kg)       General:  Awake, alert, no apparent distress. Appears stated age  [de-identified]: conjuctivae are clear without exudate or hemorrhage, anicteric sclera, moist oral mucosa. Chest:  Respirations regular, non-labored. Chest rise and fall equal bilaterally.   Abdomen:  Soft, non tender to palpation, obese, surgical scarring  Neurological: Awake, alert, oriented x4  Psychiatric:  Appropriate mood and affect  Extremities: non-traumatic in appearance. Skin:  Warm and dry  Wound:    Midline abdomen wound bed shows exposed mesh with large amounts of serosanguinous drainage. Periwound skin is dry with blanchable erythema. No warmth or fluctuance noted. Wound 09/22/21 Abdomen (Active)   Wound Image   02/09/22 1441   Wound Etiology Non-Healing Surgical 02/09/22 1441   Dressing Status Intact; Old drainage noted 02/09/22 1441   Wound Cleansed Cleansed with saline 02/09/22 1441   Dressing/Treatment Alginate with Ag;Tape/Soft cloth adhesive tape;Gauze dressing/dressing sponge 12/23/21 1318   Offloading for Diabetic Foot Ulcers No offloading required 11/11/21 1322   Wound Length (cm) 0.6 cm 02/09/22 1441   Wound Width (cm) 1 cm 02/09/22 1441   Wound Depth (cm) 1.1 cm 02/09/22 1441   Wound Surface Area (cm^2) 0.6 cm^2 02/09/22 1441   Change in Wound Size % (l*w) -140 02/09/22 1441   Wound Volume (cm^3) 0.66 cm^3 02/09/22 1441   Wound Healing % -277 02/09/22 1441   Tunneling Position ___ O'Clock 0 12/23/21 1318   Undermining Starts ___ O'Clock 12 02/09/22 1441   Undermining Ends___ O'Clock 12 02/09/22 1441   Undermining Maxium Distance (cm) 1.4 02/09/22 1441   Wound Assessment Granulation tissue;Slough; Exposed mesh 02/09/22 1441   Drainage Amount Large 02/09/22 1441   Drainage Description Serosanguinous;Green 02/09/22 1441   Odor None 02/09/22 1441   Zoë-wound Assessment Intact; Blanchable erythema;Dry/flaky 02/09/22 1441   Margins Unattached edges 02/09/22 1441   Wound Thickness Description not for Pressure Injury Full thickness 02/09/22 1441   Number of days: 140         LABS/IMAGING     UA: No results for input(s): SPECGRAV, PHUR, COLORU, CLARITYU, MUCUS, PROTEINU, BLOODU, RBCUA, WBCUA, BACTERIA, NITRU, GLUCOSEU, BILIRUBINUR, UROBILINOGEN, KETUA, LABCAST, LABCASTTY, AMORPHOS in the last 72 hours.     Invalid input(s): CRYSTALS  Micro:   Lab Results   Component Value Date    BC No growth-preliminary No growth  03/02/2021     ASSESSMENT -Nonhealing surgical wound    Ulcer Identification:  Ulcer Type: non-healing surgical    Depth of Diabetic/Pressure/Non Pressure Ulcers or Wound:  Wound, full thickness     Patient Active Problem List   Diagnosis Code    Allergy to bee sting Z91.030    Obesity, Class III, BMI 40-49.9 (morbid obesity) ([facility]) E66.01    Weight gain R63.5    Essential hypertension I10    Rheumatoid arteritis ([facility]) M05.20    Hypothyroidism E03.9    Gastroenteritis K52.9    Obstructive sleep apnea on CPAP G47.33, Z99.89    Small intestinal bacterial overgrowth K63.89    Acute diarrhea R19.7    Generalized abdominal pain R10.84    Nausea and vomiting R11.2    Hepatomegaly R16.0    Ileus ([facility]) K56.7    Hypokalemia E87.6    S/P partial resection of colon Z90.49    S/P laparotomy Z98.890    Acute kidney failure with tubular necrosis ([facility]) N17.0    Hypernatremia E87.0    Serum potassium decreased E87.6    Other headache syndrome G44.89    RLS (restless legs syndrome) G25.81    Psychophysiological insomnia F51.04    Angina, class III ([facility]) I20.9    Acute respiratory failure with hypoxia ([facility]) J96.01    Dyspnea R06.00    CHF (NYHA class III, ACC/AHA stage C) ([facility]) I50.9    Nonhealing surgical wound T81.89XA    Type 2 diabetes mellitus, without long-term current use of insulin ([facility] [address] E11.9       PLAN     Patient examined and evaluated. All available lab work, radiology studies, and progress notes pertaining to [facility] reviewed prior to or during patient visit today.    -Nonhealing surgical wound, abdomen- Wound continues, larger in size than last visit with increased amount of mesh visible. Patient voices frustration with mesh present to wound, stating he wants local anesthesia and for someone to cut the mesh out. I explained to him that I am not able to do this as I do not know the extent of the mesh.   As surgery is high risk, goals of wound at this point are prevention of infection and control of drainage-wound is unlikely to heal with exposed mesh. Recommend discussion with surgeon regarding any further options regarding mesh removal.    Wound has increased depth today. Will start use of mesalt to wound bed to act as wick for better removal of drainage. Do not overpack wound. Reinforced importance of leaving tail for easier and complete removal.  Cover with alginate, cover with gauze dressing, secure with pink hytape. Continue daily changes.      -Wound does not appear to be infected at today's visit. Continue Chlorhexidine gluconate wash 2-3 times weekly. Reinforced education on signs and symptoms of infection. Advised patient to call clinic or seek emergency care should these occur. Follow up 6 weeks. Call clinic with any needs or concerns prior to scheduled visit. All questions and concerns addressed prior to discharge from today's visit. Please see attached Discharge Instructions    Written patient dismissal instructions given to patient and signed by patient or POA. Discharge Instructions     Discharge Instructions       Visit Discharge/Physician Orders:  -Dressing supplies ordered through [facility] call them to reorder [phone]  -Okay to shower and cleanse with Tri-Hex soap a few times a week to decrease bacteria. - follow up with Dr. Misael Hong regarding exposed mesh and questions regarding ability to have any surgery     Wound Location: Abdomen     Dressing orders:      1) Gather wound care supplies and arrange on clean table.      2) Wash your hands with soap and water or use alcohol based hand  for 20 seconds (sing \"Happy Birthday\" twice).    3) Cleanse wounds with normal saline or wound cleanser and gauze. Pat dry with clean gauze., Apply a skin prep to skin around the wound     4) Abdomen- Apply zinc oxide to the skin around the wound then tuck the corner of a strip of mesalt into the wound bed making sure to leave a tail on the outside.  apply silver alginate over top the mesalt as needed for drainage, cover with a gauze or ABD pad and tape with pink hytape. Change every day     Keep all dressings clean & dry.     Follow up visit: 6 weeks 03/23/2022 at 1pm   Keep next scheduled appointment. Please give 24 hour notice if unable to keep appointment. [phone]     If you experience any of the following, please call the Wound Care Service during business hours: Monday through Friday 8:00 am - 4:30 pm  ([phone]).               *Increase in pain              *Temperature over 101              *Increase in drainage from your wound or a foul odor              *Uncontrolled swelling              *Need for compression bandage changes due to slippage, breakthrough drainage     If you need medical attention outside of business hours, please contact your Primary Care Doctor or go to the nearest emergency room.            Electronically signed by SAHIL Nielson CNP on 2/9/2022 at 3:58 PM

## 2022-02-09 NOTE — PROGRESS NOTES
[address] f: [phone] f: [phone] p: [phone] [email or web address]      Ordering Center:   [address] [address]  [phone]  WOUND CARE Dept: [phone]     [facility] [phone]    Patient Information:      Alberto Long[address]  [address] [address]   [phone]   : 1960  AGE: 63 y.o. GENDER: male   EPISODE DATE: 2022    Insurance:      PRIMARY INSURANCE:  Plan: [employer] MED[employer]  Coverage: [employer] MEDICARE  Effective Date: 2018  Group Number: [unfilled]  Subscriber Number: [id number] - (Medicare Managed)    Payor/Plan Subscr  Sex Relation Sub. Ins. ID Effective Group Num   1. [address] 1960 Male Self [id number]                                   [address]       Patient Wound Information:      Problem List Items Addressed This Visit     Nonhealing surgical wound          WOUNDS REQUIRING DRESSING SUPPLIES:     Wound 21 Abdomen (Active)   Wound Image   22 1441   Wound Etiology Non-Healing Surgical 22 1441   Dressing Status Intact; Old drainage noted 22 1441   Wound Cleansed Cleansed with saline 22 1441   Dressing/Treatment Alginate with Ag;Tape/Soft cloth adhesive tape;Gauze dressing/dressing sponge 21 1318   Offloading for Diabetic Foot Ulcers No offloading required 21 1322   Wound Length (cm) 0.6 cm 22 1441   Wound Width (cm) 1 cm 22 1441   Wound Depth (cm) 1.1 cm 22 1441   Wound Surface Area (cm^2) 0.6 cm^2 22 1441   Change in Wound Size % (l*w) -140 22 1441   Wound Volume (cm^3) 0.66 cm^3 22 1441   Wound Healing % -277 22 1441   Tunneling Position ___ O'Clock 0 21 1318   Undermining Starts ___ O'Clock 12 22 1441   Undermining Ends___ O'Clock 12 22 1441   Undermining Maxium Distance (cm) 1.4 02/09/22 1441   Wound Assessment Granulation tissue;Slough; Exposed mesh 02/09/22 1441   Drainage Amount Large 02/09/22 1441   Drainage Description Serosanguinous;Green 02/09/22 1441   Odor None 02/09/22 1441   Zoë-wound Assessment Intact; Blanchable erythema;Dry/flaky 02/09/22 1441   Margins Unattached edges 02/09/22 1441   Wound Thickness Description not for Pressure Injury Full thickness 02/09/22 1441   Number of days: 140          Supplies Requested :      WOUND #: 1   PRIMARY DRESSING:  Other: mesalt, silver alginate   Cover and Secure with: ABD pad     FREQUENCY OF DRESSING CHANGES:  Daily       ADDITIONAL ITEMS:  [] Gloves Small  [x] Gloves Medium [] Gloves Large [] Gloves XLarge  [] Tape 1\" [] Tape 2\" [] Tape 3\"  [] Medipore Tape  [x] Saline  [] Skin Prep   [] Adhesive Remover   [] Cotton Tip Applicators   [x] Other: pink Hytape    Patient Wound(s) Debrided: [] Yes if yes please add date 02/09/2022   [] No    Debribement Type: Mechanical     Patient currently being seen by Home Health: [] Yes   [x] No    Duration for needed supplies:  []15  []30  []60  [x]90 Days    Electronically signed by Asha Trujillo RN on 2/9/2022 at 3:18 PM     Provider Information:      Luciana Christiano NAME: Elinorl Cancer CNP     NPI: [id number]

## 2022-02-11 ENCOUNTER — TELEPHONE (OUTPATIENT)
Dept: CARDIOLOGY CLINIC | Age: 62
End: 2022-02-11

## 2022-02-11 DIAGNOSIS — I50.32 CHF (CONGESTIVE HEART FAILURE), NYHA CLASS II, CHRONIC, DIASTOLIC (HCC): Primary | ICD-10-CM

## 2022-02-11 NOTE — TELEPHONE ENCOUNTER
Spoke to patient. Very SOB, huffing walking around house. BP yesterday 89/49, today 106/64. Denies weight gain or swelling, weight is actually down. VO from ROCIO Riddle:  D/t lower BP, hold Clonidine all weekend. If SBP <100 also hold Norvasc. Can take the extra Bumex today or wait until tomorrow when BP has recovered. Call with update on Monday    Called x2 to patient. No answer, left detailed message.

## 2022-02-11 NOTE — TELEPHONE ENCOUNTER
PAD is elevated based on last two cardiomems sent. Ask for symptoms, watch diet.  If noticing weight gain may take extra Bumex

## 2022-02-11 NOTE — TELEPHONE ENCOUNTER
Spoke to patient. Has not been on Clonidine for months now. VO from Suresh Daly CNP:  Hold hydrlazine. If BP starts to rise to high, take 0.5 tab TID.  Get CBC, BMP

## 2022-02-15 ENCOUNTER — TELEPHONE (OUTPATIENT)
Dept: SURGERY | Age: 62
End: 2022-02-15

## 2022-02-15 ENCOUNTER — HOSPITAL ENCOUNTER (OUTPATIENT)
Age: 62
Discharge: HOME | End: 2022-02-15
Payer: MEDICARE

## 2022-02-15 ENCOUNTER — OFFICE VISIT (OUTPATIENT)
Dept: SURGERY | Age: 62
End: 2022-02-15
Payer: MEDICARE

## 2022-02-15 ENCOUNTER — OFFICE VISIT (OUTPATIENT)
Dept: PULMONOLOGY | Age: 62
End: 2022-02-15
Payer: MEDICARE

## 2022-02-15 VITALS
WEIGHT: 315 LBS | DIASTOLIC BLOOD PRESSURE: 70 MMHG | SYSTOLIC BLOOD PRESSURE: 116 MMHG | OXYGEN SATURATION: 95 % | HEART RATE: 101 BPM | BODY MASS INDEX: 41.75 KG/M2 | TEMPERATURE: 98.3 F | HEIGHT: 73 IN

## 2022-02-15 VITALS
TEMPERATURE: 97 F | HEIGHT: 73 IN | BODY MASS INDEX: 41.75 KG/M2 | HEART RATE: 102 BPM | OXYGEN SATURATION: 94 % | WEIGHT: 315 LBS | DIASTOLIC BLOOD PRESSURE: 64 MMHG | SYSTOLIC BLOOD PRESSURE: 110 MMHG | RESPIRATION RATE: 20 BRPM

## 2022-02-15 DIAGNOSIS — R06.2 WHEEZING: ICD-10-CM

## 2022-02-15 DIAGNOSIS — Z51.89 VISIT FOR WOUND CHECK: ICD-10-CM

## 2022-02-15 DIAGNOSIS — E66.9 RESTRICTIVE LUNG DISEASE SECONDARY TO OBESITY: ICD-10-CM

## 2022-02-15 DIAGNOSIS — I50.32 CHF (CONGESTIVE HEART FAILURE), NYHA CLASS II, CHRONIC, DIASTOLIC (HCC): ICD-10-CM

## 2022-02-15 DIAGNOSIS — E66.01 MORBID OBESITY WITH BMI OF 40.0-44.9, ADULT (HCC): ICD-10-CM

## 2022-02-15 DIAGNOSIS — T81.89XA NON-HEALING SURGICAL WOUND, INITIAL ENCOUNTER: Primary | ICD-10-CM

## 2022-02-15 DIAGNOSIS — J98.4 RESTRICTIVE LUNG DISEASE SECONDARY TO OBESITY: ICD-10-CM

## 2022-02-15 DIAGNOSIS — J45.20 MILD INTERMITTENT ASTHMA WITHOUT COMPLICATION: Primary | ICD-10-CM

## 2022-02-15 LAB
ANION GAP SERPL CALCULATED.3IONS-SCNC: 12 MEQ/L (ref 8–16)
BUN BLDV-MCNC: 39 MG/DL (ref 7–22)
CALCIUM SERPL-MCNC: 9.1 MG/DL (ref 8.5–10.5)
CHLORIDE BLD-SCNC: 101 MEQ/L (ref 98–111)
CO2: 22 MEQ/L (ref 23–33)
CREAT SERPL-MCNC: 2.1 MG/DL (ref 0.4–1.2)
ERYTHROCYTE [DISTWIDTH] IN BLOOD BY AUTOMATED COUNT: 17.2 % (ref 11.5–14.5)
ERYTHROCYTE [DISTWIDTH] IN BLOOD BY AUTOMATED COUNT: 56 FL (ref 35–45)
GFR SERPL CREATININE-BSD FRML MDRD: 32 ML/MIN/1.73M2
GLUCOSE BLD-MCNC: 140 MG/DL (ref 70–108)
HCT VFR BLD CALC: 32.3 % (ref 42–52)
HEMOGLOBIN: 10 GM/DL (ref 14–18)
MCH RBC QN AUTO: 27.8 PG (ref 26–33)
MCHC RBC AUTO-ENTMCNC: 31 GM/DL (ref 32.2–35.5)
MCV RBC AUTO: 89.7 FL (ref 80–94)
PLATELET # BLD: 346 THOU/MM3 (ref 130–400)
PMV BLD AUTO: 9.4 FL (ref 9.4–12.4)
POTASSIUM SERPL-SCNC: 5.6 MEQ/L (ref 3.5–5.2)
RBC # BLD: 3.6 MILL/MM3 (ref 4.7–6.1)
SODIUM BLD-SCNC: 135 MEQ/L (ref 135–145)
WBC # BLD: 7.6 THOU/MM3 (ref 4.8–10.8)

## 2022-02-15 PROCEDURE — 3017F COLORECTAL CA SCREEN DOC REV: CPT | Performed by: NURSE PRACTITIONER

## 2022-02-15 PROCEDURE — 4004F PT TOBACCO SCREEN RCVD TLK: CPT | Performed by: SURGERY

## 2022-02-15 PROCEDURE — G8484 FLU IMMUNIZE NO ADMIN: HCPCS | Performed by: NURSE PRACTITIONER

## 2022-02-15 PROCEDURE — 4004F PT TOBACCO SCREEN RCVD TLK: CPT | Performed by: NURSE PRACTITIONER

## 2022-02-15 PROCEDURE — G8417 CALC BMI ABV UP PARAM F/U: HCPCS | Performed by: NURSE PRACTITIONER

## 2022-02-15 PROCEDURE — 99213 OFFICE O/P EST LOW 20 MIN: CPT | Performed by: NURSE PRACTITIONER

## 2022-02-15 PROCEDURE — G8427 DOCREV CUR MEDS BY ELIG CLIN: HCPCS | Performed by: NURSE PRACTITIONER

## 2022-02-15 PROCEDURE — 85027 COMPLETE CBC AUTOMATED: CPT

## 2022-02-15 PROCEDURE — 36415 COLL VENOUS BLD VENIPUNCTURE: CPT

## 2022-02-15 PROCEDURE — 3017F COLORECTAL CA SCREEN DOC REV: CPT | Performed by: SURGERY

## 2022-02-15 PROCEDURE — G8482 FLU IMMUNIZE ORDER/ADMIN: HCPCS | Performed by: SURGERY

## 2022-02-15 PROCEDURE — G8427 DOCREV CUR MEDS BY ELIG CLIN: HCPCS | Performed by: SURGERY

## 2022-02-15 PROCEDURE — 99213 OFFICE O/P EST LOW 20 MIN: CPT | Performed by: SURGERY

## 2022-02-15 PROCEDURE — G8417 CALC BMI ABV UP PARAM F/U: HCPCS | Performed by: SURGERY

## 2022-02-15 PROCEDURE — 80048 BASIC METABOLIC PNL TOTAL CA: CPT

## 2022-02-15 RX ORDER — ALBUTEROL SULFATE 90 UG/1
2 AEROSOL, METERED RESPIRATORY (INHALATION) EVERY 6 HOURS PRN
Qty: 1 EACH | Refills: 5 | Status: SHIPPED | OUTPATIENT
Start: 2022-02-15

## 2022-02-15 RX ORDER — MONTELUKAST SODIUM 10 MG/1
10 TABLET ORAL NIGHTLY
Qty: 90 TABLET | Refills: 3 | Status: SHIPPED | OUTPATIENT
Start: 2022-02-15

## 2022-02-15 ASSESSMENT — ENCOUNTER SYMPTOMS
STRIDOR: 0
NAUSEA: 0
ALLERGIC/IMMUNOLOGIC NEGATIVE: 1
SHORTNESS OF BREATH: 1
WHEEZING: 1
DIARRHEA: 0
CHEST TIGHTNESS: 0
GASTROINTESTINAL NEGATIVE: 1
EYES NEGATIVE: 1
VOMITING: 0

## 2022-02-15 NOTE — TELEPHONE ENCOUNTER
Pre op Risk Assessment    Procedure excision of his abdominal wall mesh under a general anesthetic  Physician Ronda  Date of surgery/procedure TBD    Last OV 1/13/22  Last Stress 4/26/19  Last Echo 5/24/21  Last Cath 12/1/2020  Last Stent None in Epic  Is patient on blood thinners ASA  Hold Meds/how many days 5

## 2022-02-15 NOTE — PROGRESS NOTES
[address] [facility] [address] [address]  Dept: [phone]  Dept Fax: [phone]  Loc: [phone]    Visit Date: 2/15/2022    Isis Lim is a 63 y.o. male who presentstoday for:  Chief Complaint   Patient presents with    Wound Check     Abdominal wound check. Last seen in the office 9/2021. s/p Abdominal exploration,Lysis of severe adhesions,Extended right colon resection-2/6/2018        HPI:     HPI 24-year-old white male who has had a history of hernia repair with mesh who in 2018 had an abdominal expiration lysis of adhesions and an extended right colon resection basically due to severe constipation. Patient has developed a small wound that will not heal he was last seen 5 months ago there is been really no change she still has some drainage she has been on and off antibiotics there has been some concern for potential mesh infection but the patient has a lot of medical issues we have discussed with him the potential of removing all the mesh would be just a huge big operation drainage has been minimal he has had no cellulitis of the abdominal wall but is here for wound recheck    Past Medical History:   Diagnosis Date    Arthritis     Back problem     back pain-sees [facility] pain mgmt    Bladder disease     Dr. Maureen Aponte CHF with unknown LVEF ([facility] [address] 05/24/2021    Dr. George/CHF clinic    Constipation     Diabetes mellitus ([facility] [address]     Dr. Devin Herrera Hypertension     Hypertensive emergency 4/11/2019    Hypertensive urgency 4/13/2019    Sleep apnea     has bipap-sees AZRA Brock CNP    Thyroid disease       Past Surgical History:   Procedure Laterality Date    ABDOMEN SURGERY      APPENDECTOMY      CARDIAC CATHETERIZATION      no stents    CARPAL TUNNEL RELEASE Bilateral     COLONOSCOPY  2017    Dr. Haylee Issa, [address]      x3 surgeries with 14 repairs    KNEE SURGERY Right 1980's Take 1 tablet by mouth daily      spironolactone (ALDACTONE) 50 MG tablet Take 1 tablet by mouth 2 times daily 180 tablet 3    hydrALAZINE (APRESOLINE) 100 MG tablet Take 1 tablet by mouth every 8 hours 270 tablet 3    nitroGLYCERIN (NITROSTAT) 0.4 MG SL tablet Place 1 tablet under the tongue every 5 minutes as needed for Chest pain (up to 3 doses) 25 tablet 3    Fluticasone furoate-vilanterol (BREO ELLIPTA) 200-25 MCG/INH AEPB inhaler Inhale 1 puff into the lungs daily 3 each 3    oxybutynin (DITROPAN XL) 10 MG extended release tablet Take 1 tablet by mouth daily 90 tablet 3    tamsulosin (FLOMAX) 0.4 MG capsule Take 1 capsule by mouth nightly 90 capsule 3    montelukast (SINGULAIR) 10 MG tablet Take 1 tablet by mouth daily 30 tablet 11    levothyroxine (SYNTHROID) 200 MCG tablet Take 1 tablet by mouth Daily 30 tablet 1    bismuth subsalicylate (PEPTO BISMOL) 262 MG/15ML suspension Take by mouth daily      glimepiride (AMARYL) 4 MG tablet Take 8 mg by mouth daily       carvedilol (COREG) 25 MG tablet Take 1 tablet by mouth 2 times daily 180 tablet 3    acetaminophen (TYLENOL) 500 MG tablet Take 1 tablet by mouth every 6 hours as needed for Pain 40 tablet 0    cyclobenzaprine (FLEXERIL) 10 MG tablet Take 10 mg by mouth 3 times daily as needed for Muscle spasms      albuterol (PROVENTIL HFA;VENTOLIN HFA) 108 (90 BASE) MCG/ACT inhaler Inhale 1 puff into the lungs every 6 hours as needed for Wheezing.  azelastine (ASTELIN) 137 MCG/SPRAY nasal spray 1 spray by Nasal route as needed. Use in each nostril as directed       No current facility-administered medications for this visit.      Allergies   Allergen Reactions    Shellfish-Derived Products Swelling     Tolerates IV dye without any problems      Pcn [Penicillins] Itching    Succinylcholine      Pseudocholinesterase Deficiency    Sulfa Antibiotics Itching    Morphine Nausea And Vomiting     \"head swimming, skin crawling\"    Tape [facility] Tape] Rash       Subjective:      Review of Systems   Constitutional: Negative. Negative for activity change, appetite change, chills, diaphoresis, fatigue, fever and unexpected weight change. HENT: Negative. Negative for congestion, dental problem, drooling, ear discharge, ear pain, facial swelling, hearing loss, mouth sores, nosebleeds, postnasal drip, rhinorrhea, sinus pressure, sinus pain, sneezing, sore throat, tinnitus, trouble swallowing and voice change. Eyes: Negative. Negative for photophobia, pain, discharge, redness, itching and visual disturbance. Respiratory: Positive for apnea and shortness of breath. Negative for cough, choking, chest tightness, wheezing and stridor. Cardiovascular: Negative for chest pain, palpitations and leg swelling. Gastrointestinal: Positive for abdominal pain and diarrhea. Endocrine: Negative. Negative for cold intolerance, heat intolerance, polydipsia, polyphagia and polyuria. Genitourinary: Negative. Negative for decreased urine volume, difficulty urinating, dysuria, enuresis, flank pain, frequency, genital sores, hematuria, penile discharge, penile pain, penile swelling, scrotal swelling, testicular pain and urgency. Musculoskeletal: Positive for arthralgias and back pain. Negative for gait problem, joint swelling, myalgias, neck pain and neck stiffness. Skin: Negative for color change, pallor, rash and wound. Allergic/Immunologic: Negative. Negative for environmental allergies, food allergies and immunocompromised state. Neurological: Negative. Negative for dizziness, tremors, seizures, syncope, facial asymmetry, speech difficulty, weakness, light-headedness, numbness and headaches. Hematological: Negative. Negative for adenopathy. Does not bruise/bleed easily.    Psychiatric/Behavioral: Negative for agitation, behavioral problems, confusion, decreased concentration, dysphoric mood, hallucinations, self-injury, sleep disturbance and suicidal ideas. The patient is not nervous/anxious and is not hyperactive. Objective:   /64 (Site: Left Lower Arm, Position: Sitting, Cuff Size: Medium Adult)   Pulse 102   Temp 97 °F (36.1 °C)   Resp 20   Ht 6' 1\" (1.854 m)   Wt (!) 334 lb 9.6 oz (151.8 kg)   SpO2 94%   BMI 44.15 kg/m²     Physical Exam  Constitutional:       Appearance: He is well-developed. HENT:      Head: Normocephalic and atraumatic. Eyes:      General: No scleral icterus. Left eye: No discharge. Conjunctiva/sclera: Conjunctivae normal.      Pupils: Pupils are equal, round, and reactive to light. Neck:      Thyroid: No thyromegaly. Trachea: No tracheal deviation. Cardiovascular:      Rate and Rhythm: Normal rate and regular rhythm. Heart sounds: Normal heart sounds. No murmur heard. No friction rub. No gallop. Pulmonary:      Effort: Pulmonary effort is normal. No respiratory distress. Breath sounds: Normal breath sounds. No wheezing or rales. Chest:      Chest wall: No tenderness. Abdominal:      General: There is no distension. Palpations: There is no mass. Tenderness: There is no guarding or rebound. Hernia: No hernia is present. Musculoskeletal:         General: No tenderness. Normal range of motion. Lymphadenopathy:      Cervical: No cervical adenopathy. Skin:     General: Skin is warm and dry. Coloration: Skin is not pale. Findings: No erythema or rash. Neurological:      Mental Status: He is alert and oriented to person, place, and time. Psychiatric:         Behavior: Behavior normal.         Thought Content:  Thought content normal.         Judgment: Judgment normal.            Results for orders placed or performed during the hospital encounter of 63/21/05   Basic Metabolic Panel   Result Value Ref Range    Sodium 135 135 - 145 meq/L    Potassium 4.6 3.5 - 5.2 meq/L    Chloride 98 98 - 111 meq/L    CO2 26 23 - 33 meq/L    Glucose 161 (H) 70 - 108 mg/dL    BUN 16 7 - 22 mg/dL    CREATININE 1.3 (H) 0.4 - 1.2 mg/dL    Calcium 9.8 8.5 - 10.5 mg/dL   Anion Gap   Result Value Ref Range    Anion Gap 11.0 8.0 - 16.0 meq/L   Glomerular Filtration Rate, Estimated   Result Value Ref Range    Est, Glom Filt Rate 56 (A) ml/min/1.73m2       Assessment:     Persistent open wound but minimal drainage no evidence of exposed mesh I believe attempting to remove the incision would involve removing mesh I feel that as long as he can tolerate discontinuing dressing changes no obvious significant infection we should continue to monitor wound he is in agreement his bowel movements are better since the operation    Plan:     Return to office as needed    Electronicallysigned by Martha Hassan MD on 2/15/2022 at 11:16 AM

## 2022-02-15 NOTE — TELEPHONE ENCOUNTER
Spoke to patient. Took the 50mg Hydralazine all weekend along with his Entresto, Bumex, and ASA. Had not checked BP yet this AM but was 160s/?? Yesterday. Took extra Bumex on Saturday. Says weight is up 5 lb from yesterday, denies swelling but still has SOB that has been ongoing for awhile now.

## 2022-02-15 NOTE — TELEPHONE ENCOUNTER
Patient saw Dr. Michael Francis today and we are looking to get him scheduled for an excision of his abdominal wall mesh under a general anesthetic. Surgery date pending clearance. Could you please ask Dr. Adeline Irvin if the patient is okay to undergo this surgical procedure?   Thank you

## 2022-02-15 NOTE — PROGRESS NOTES
[facility]    Patient: Naga Matthews, 63 y.o.   : 1960  2/15/2022      Subjective     Chief Complaint   Patient presents with    Follow-up     asthma follow up no test        HPI  Yohannes Montero is here for follow up for Asthma no testing to review today. Patient to be on Breo and CHRISSIE PRN. Singulair at bedtime. Last visit NIOX still elevated would like to test again today in office but patient is not taking his Tasha Vieques again which was prescribed in May 2021 for one year- patient states he has none   Patient follows with CHF clinic - patient has Cardiomeme  Never a smoker   LAURIE treated with BiPAP follows with Lianne Navarro PA-C compliant and well controlled AHI  Following with Dr. Ricarda Dance currently for non-healing abdominal wound- abdominal/hernia surgery done in 2018. SOB worse with exertion  Intermittent wheezing   No nighttime awakenings - patient taking his Singulair   Patient is willing to start his Breo again     Asthma control (Severity) questionnaire:  Asthma symptoms:  > 2 times per week -> No   Night time awakenings: > 2 times per month -> No  Use of short acting beta agonist for symptoms control (Other than pre exercise use) :> 2times per week  -> Yes  Interference with normal activity  -> mild  Lung function: Fev1 >60% Predicted  -> Yes  Number of exacerbations per year: > 2 -> No    Progress History:   Since last visit any new medical issues? No  New ER or hospital visits? No  Any new or changes in medicines? No  Using inhalers? Yes   Are they helpful?  Yes if needed   Flu vaccine UTD  Pneumonia vaccine UTD  Covid vaccinations done and booster   Past Medical hx   PMH:  Past Medical History:   Diagnosis Date    Arthritis     Back problem     back pain-sees [facility] pain mgmt    Bladder disease     Dr. Maureen Aponte CHF with unknown LVEF ([facility] [address] 2021    Dr. George/CHF clinic    Constipation     Diabetes mellitus ([facility] [address]     Dr. Devin Herrera Hypertension     Hypertensive emergency 4/11/2019    Hypertensive urgency 4/13/2019    Sleep apnea     has bipap-sees AZRA Downey CNP    Thyroid disease      SURGICAL HISTORY:  Past Surgical History:   Procedure Laterality Date    ABDOMEN SURGERY      APPENDECTOMY      CARDIAC CATHETERIZATION      no stents    CARPAL TUNNEL RELEASE Bilateral     COLONOSCOPY  2017    Dr. Servando Kyle, ESOPHAGUS     [address]      x3 surgeries with 14 repairs    KNEE SURGERY Right 1980's    cartilage    [facility] EXPLORATORY OF ABDOMEN N/A 2/5/2018    ABDOMINAL EXPLORATION WITH LYSIS OF COMPLICATED ADHESIONS WITH AN EXTENDED RIGHT COLON RESECTION performed by Amairani Lane MD at [address] [place] [address] Road:  Social History     Tobacco Use    Smoking status: Former Smoker     Types: Pipe    Smokeless tobacco: Current User     Types: Chew    Tobacco comment: quit pipe over 30 yrs ago   Vaping Use    Vaping Use: Never used   Substance Use Topics    Alcohol use: No     Alcohol/week: 0.0 standard drinks     Comment: quit    Drug use: No     ALLERGIES:  Allergies   Allergen Reactions    Shellfish-Derived Products Swelling     Tolerates IV dye without any problems      Pcn [Penicillins] Itching    Succinylcholine      Pseudocholinesterase Deficiency    Sulfa Antibiotics Itching    Morphine Nausea And Vomiting     \"head swimming, skin crawling\"    Tape [Adhesive Tape] Rash     FAMILY HISTORY:  Family History   Problem Relation Age of Onset    Cancer Mother         breast    Heart Disease Father         bladder, lung    Cancer Father     Stroke Brother     Heart Attack Brother     Prostate Cancer Brother     Diabetes Paternal Grandmother     Arthritis Brother     High Blood Pressure Neg Hx      CURRENT MEDICATIONS:  Current Outpatient Medications   Medication Sig Dispense Refill    Fluticasone furoate-vilanterol (BREO ELLIPTA) 200-25 MCG/INH AEPB inhaler Inhale 1 puff into the lungs daily 3 each 3    montelukast (SINGULAIR) 10 MG tablet Take 1 tablet by mouth nightly 90 tablet 3    albuterol sulfate  (90 Base) MCG/ACT inhaler Inhale 2 puffs into the lungs every 6 hours as needed for Wheezing or Shortness of Breath 1 each 5    oxyCODONE-acetaminophen (PERCOCET)  MG per tablet Take 1 tablet by mouth every 8 hours as needed for Pain for up to 30 days. Intended supply: 30 days 90 tablet 0    CPAP Machine MISC by Does not apply route Please change bipap 17/13 cm H20. 1 each 0    rOPINIRole (REQUIP) 1 MG tablet Take 1 pill in afternoon and 2 at bedtime 270 tablet 1    doxepin (SINEQUAN) 25 MG capsule Take 2 capsules by mouth nightly 60 capsule 3    gabapentin (NEURONTIN) 300 MG capsule Take 1 capsule by mouth nightly for 30 days. 30 capsule 0    ibuprofen (ADVIL;MOTRIN) 200 MG tablet Take 400 mg by mouth every 6 hours as needed for Pain      aspirin EC 81 MG EC tablet Take 1 tablet by mouth daily 90 tablet 1    sacubitril-valsartan (ENTRESTO) 49-51 MG per tablet Take 1 tablet by mouth 2 times daily 60 tablet 11    bumetanide (BUMEX) 1 MG tablet Take 1 tablet by mouth daily AND 1 tablet as needed.  60 tablet 6    cloNIDine (CATAPRES) 0.2 MG tablet Take 1 tablet by mouth 3 times daily      JANUVIA 100 MG tablet Take 1 tablet by mouth daily      amLODIPine (NORVASC) 10 MG tablet Take 1 tablet by mouth daily      spironolactone (ALDACTONE) 50 MG tablet Take 1 tablet by mouth 2 times daily 180 tablet 3    hydrALAZINE (APRESOLINE) 100 MG tablet Take 1 tablet by mouth every 8 hours 270 tablet 3    nitroGLYCERIN (NITROSTAT) 0.4 MG SL tablet Place 1 tablet under the tongue every 5 minutes as needed for Chest pain (up to 3 doses) 25 tablet 3    oxybutynin (DITROPAN XL) 10 MG extended release tablet Take 1 tablet by mouth daily 90 tablet 3    tamsulosin (FLOMAX) 0.4 MG capsule Take 1 capsule by mouth nightly 90 capsule 3    levothyroxine (SYNTHROID) 200 MCG tablet Take 1 tablet by mouth Daily 30 tablet 1    bismuth subsalicylate (PEPTO BISMOL) 262 MG/15ML suspension Take by mouth daily      glimepiride (AMARYL) 4 MG tablet Take 8 mg by mouth daily       carvedilol (COREG) 25 MG tablet Take 1 tablet by mouth 2 times daily 180 tablet 3    acetaminophen (TYLENOL) 500 MG tablet Take 1 tablet by mouth every 6 hours as needed for Pain 40 tablet 0    cyclobenzaprine (FLEXERIL) 10 MG tablet Take 10 mg by mouth 3 times daily as needed for Muscle spasms      azelastine (ASTELIN) 137 MCG/SPRAY nasal spray 1 spray by Nasal route as needed. Use in each nostril as directed       No current facility-administered medications for this visit. ROS   Review of Systems   Constitutional: Negative. Negative for chills, fever and unexpected weight change. HENT: Negative. Eyes: Negative. Respiratory: Positive for shortness of breath and wheezing. Negative for chest tightness and stridor. Cardiovascular: Positive for leg swelling. Negative for chest pain. Gastrointestinal: Negative. Negative for diarrhea, nausea and vomiting. Endocrine: Negative. Genitourinary: Negative. Negative for dysuria. Musculoskeletal: Negative. Skin: Positive for wound (abdominal ). Allergic/Immunologic: Negative. Neurological: Negative. Hematological: Negative. Psychiatric/Behavioral: Negative. Physical exam   /70 (Site: Left Lower Arm, Position: Sitting, Cuff Size: Medium Adult)   Pulse 101   Temp 98.3 °F (36.8 °C) (Oral)   Ht 6' 1\" (1.854 m)   Wt (!) 338 lb 9.6 oz (153.6 kg)   SpO2 95%   BMI 44.67 kg/m²    Wt Readings from Last 3 Encounters:   02/15/22 (!) 338 lb 9.6 oz (153.6 kg)   02/15/22 (!) 334 lb 9.6 oz (151.8 kg)   01/17/22 (!) 333 lb (151 kg)       Physical Exam  Vitals and nursing note reviewed. Constitutional:       General: He is not in acute distress. Appearance: He is well-developed. He is obese. HENT:      Head: Normocephalic and atraumatic.    Neck:      Trachea: No tracheal deviation. Cardiovascular:      Rate and Rhythm: Normal rate and regular rhythm. Heart sounds: Normal heart sounds. No murmur heard. Pulmonary:      Effort: Pulmonary effort is normal. No respiratory distress. Breath sounds: No stridor. Examination of the right-lower field reveals decreased breath sounds. Examination of the left-lower field reveals decreased breath sounds. Decreased breath sounds present. No wheezing or rales. Chest:      Chest wall: No tenderness. Abdominal:      General: Bowel sounds are normal. There is no distension. Palpations: Abdomen is soft. Comments: Known abdominal wound    Musculoskeletal:      Right lower le+ Edema present. Left lower le+ Edema present. Skin:     General: Skin is warm and dry. Capillary Refill: Capillary refill takes less than 2 seconds. Neurological:      Mental Status: He is alert and oriented to person, place, and time. Psychiatric:         Behavior: Behavior normal.         Thought Content: Thought content normal.         Judgment: Judgment normal.       results   Lung Nodule Screening     [] Qualifies    [x] Does not qualify   [] Declined    [] Completed  The USPSTF recommends annual screening for lung cancer with low-dose computed tomography (LDCT) in adults aged 48 to [de-identified] years who have a 30 pack-year smoking history and currently smoke or have quit within the past 20 years. Screening should be discontinued once a person has not smoked for 20 years or develops a health problem that substantially limits life expectancy or the ability or willingness to have curative lung surgery.     -No testing to review today     Assessment      Diagnosis Orders   1. Mild intermittent asthma without complication  Fluticasone furoate-vilanterol (BREO ELLIPTA) 200-25 MCG/INH AEPB inhaler    montelukast (SINGULAIR) 10 MG tablet    albuterol sulfate  (90 Base) MCG/ACT inhaler   2.  Restrictive lung disease secondary to obesity     3. Morbid obesity with BMI of 40.0-44.9, adult ([address]     4. Wheezing  montelukast (SINGULAIR) 10 MG tablet         Plan   -Will start patient on Breo Ellipta 200/25 mcg/blister DPI 1puff daily in am. Agustin Castro educated and demonstrated by me in my office how to use Breo Ellipta 200/25 mcg/blister DPI. Patient verbalizes understanding. He was detailed about mechanism of action of drug along with associated side effects. He agreed to take the risk and medication.  He verbalizes understanding.  -Will recheck NIOX in office next visit   -Continue Singulair 10 mg PO at bedtime- refilled   -Albuterol HFA refilled   -Weight loss encouraged   -Advised to maintain pneumonia vaccine with PCP and to take flu vaccine this coming season.  -Advised patient to call office with any changes, questions, or concerns regarding respiratory status    Will see Agustin Castro back in: 3 months    Marcus Virgen  2/15/2022

## 2022-02-16 NOTE — TELEPHONE ENCOUNTER
Hold Entresto, Bumex, and Aldactone x 3 days then resume Entresto and Bumex but only Aldactone 25 mg/day (currently on 50 BID). Bmp next week.

## 2022-02-21 DIAGNOSIS — I50.32 CHF (CONGESTIVE HEART FAILURE), NYHA CLASS II, CHRONIC, DIASTOLIC (HCC): Primary | ICD-10-CM

## 2022-02-21 PROCEDURE — 93264 REM MNTR WRLS P-ART PRS SNR: CPT | Performed by: NURSE PRACTITIONER

## 2022-02-23 RX ORDER — ROPINIROLE 1 MG/1
TABLET, FILM COATED ORAL
Qty: 180 TABLET | Refills: 5 | OUTPATIENT
Start: 2022-02-23

## 2022-02-23 NOTE — TELEPHONE ENCOUNTER
I just sent in 90 day supply in dec with 1 refill which would be 6 months of medications. Please verify with pharmacy what they need.   Also the quantity was 270 and not 180

## 2022-02-24 ENCOUNTER — HOSPITAL ENCOUNTER (OUTPATIENT)
Age: 62
Discharge: HOME | End: 2022-02-24
Payer: MEDICARE

## 2022-02-24 DIAGNOSIS — I50.32 CHF (CONGESTIVE HEART FAILURE), NYHA CLASS II, CHRONIC, DIASTOLIC (HCC): ICD-10-CM

## 2022-02-24 PROCEDURE — 80048 BASIC METABOLIC PNL TOTAL CA: CPT

## 2022-02-24 PROCEDURE — 36415 COLL VENOUS BLD VENIPUNCTURE: CPT

## 2022-02-24 NOTE — TELEPHONE ENCOUNTER
See the Cardiac clearance form in Media but there is nothing marked on it. Can you please review this?   Thank you

## 2022-02-25 LAB
ANION GAP SERPL CALCULATED.3IONS-SCNC: 11 MEQ/L (ref 8–16)
BUN BLDV-MCNC: 25 MG/DL (ref 7–22)
CALCIUM SERPL-MCNC: 9.1 MG/DL (ref 8.5–10.5)
CHLORIDE BLD-SCNC: 103 MEQ/L (ref 98–111)
CO2: 23 MEQ/L (ref 23–33)
CREAT SERPL-MCNC: 2.2 MG/DL (ref 0.4–1.2)
GFR SERPL CREATININE-BSD FRML MDRD: 31 ML/MIN/1.73M2
GLUCOSE BLD-MCNC: 74 MG/DL (ref 70–108)
POTASSIUM SERPL-SCNC: 4.2 MEQ/L (ref 3.5–5.2)
SODIUM BLD-SCNC: 137 MEQ/L (ref 135–145)

## 2022-02-25 RX ORDER — SPIRONOLACTONE 50 MG/1
25 TABLET, FILM COATED ORAL DAILY
Qty: 180 TABLET | Refills: 3 | Status: ON HOLD
Start: 2022-02-25 | End: 2022-03-30 | Stop reason: HOSPADM

## 2022-02-25 NOTE — TELEPHONE ENCOUNTER
Labs reviewed K+ improved but kidney function still elevated = baseline creat 1.3-1.4 - now bumped 2.1-2.2  Held Entresto, Bumex, Aldactone x 3 days then resumed meds w/ only 1/4 dose Aldactone. Creat still up  Ensure no worsening CHF symptoms - swelling, bloating, increased SOB. Hold Entresto, Bumex and Aldactone indefinitely   F/U appt next Thursday if able  Call w/ update on Monday.   Keep diary BPs and blood sugars

## 2022-02-25 NOTE — TELEPHONE ENCOUNTER
Called and spoke to pt regarding f/u bloodwork  Reports he's been having a lot GI issues/diarrhea lately (past few weeks). Not eating well as everything \"goes through\" him  Does not have GI doctor  States Kimber Lee wants to take him back to surgery d/t open wound from infected mesh. Denies feeling fluid overloaded today - does state started getting more SOB and LE edema when held meds last week for 3 days. Instructed to hold Entresto and Aldactone and use Bumex PRN   today (this is low for him - more fatigued) - hypoperfusion may be adding to kidneys worsening  Decrease Hydralazine to 25 BID - take 50 BID if BP>140-150  Keep BP diary  Appt scheduled for next week.

## 2022-03-03 ENCOUNTER — HOSPITAL ENCOUNTER (OUTPATIENT)
Age: 62
Discharge: HOME | End: 2022-03-03
Payer: MEDICARE

## 2022-03-03 ENCOUNTER — HOSPITAL ENCOUNTER (OUTPATIENT)
Dept: GENERAL RADIOLOGY | Age: 62
Discharge: HOME | End: 2022-03-03
Payer: MEDICARE

## 2022-03-03 ENCOUNTER — OFFICE VISIT (OUTPATIENT)
Dept: CARDIOLOGY CLINIC | Age: 62
End: 2022-03-03
Payer: MEDICARE

## 2022-03-03 VITALS
SYSTOLIC BLOOD PRESSURE: 136 MMHG | HEART RATE: 94 BPM | DIASTOLIC BLOOD PRESSURE: 80 MMHG | HEIGHT: 73 IN | BODY MASS INDEX: 41.75 KG/M2 | WEIGHT: 315 LBS | OXYGEN SATURATION: 96 %

## 2022-03-03 DIAGNOSIS — I50.32 CHF (CONGESTIVE HEART FAILURE), NYHA CLASS II, CHRONIC, DIASTOLIC (HCC): ICD-10-CM

## 2022-03-03 DIAGNOSIS — I50.32 CHF (CONGESTIVE HEART FAILURE), NYHA CLASS II, CHRONIC, DIASTOLIC (HCC): Primary | ICD-10-CM

## 2022-03-03 LAB
ANION GAP SERPL CALCULATED.3IONS-SCNC: 11 MEQ/L (ref 8–16)
BUN BLDV-MCNC: 23 MG/DL (ref 7–22)
CALCIUM SERPL-MCNC: 8.7 MG/DL (ref 8.5–10.5)
CHLORIDE BLD-SCNC: 101 MEQ/L (ref 98–111)
CO2: 26 MEQ/L (ref 23–33)
CREAT SERPL-MCNC: 2 MG/DL (ref 0.4–1.2)
D-DIMER QUANTITATIVE: 3541 NG/ML FEU (ref 0–500)
ERYTHROCYTE [DISTWIDTH] IN BLOOD BY AUTOMATED COUNT: 16.7 % (ref 11.5–14.5)
ERYTHROCYTE [DISTWIDTH] IN BLOOD BY AUTOMATED COUNT: 52.1 FL (ref 35–45)
GFR SERPL CREATININE-BSD FRML MDRD: 34 ML/MIN/1.73M2
GLUCOSE BLD-MCNC: 136 MG/DL (ref 70–108)
HCT VFR BLD CALC: 28.2 % (ref 42–52)
HEMOGLOBIN: 8.9 GM/DL (ref 14–18)
MCH RBC QN AUTO: 27.5 PG (ref 26–33)
MCHC RBC AUTO-ENTMCNC: 31.6 GM/DL (ref 32.2–35.5)
MCV RBC AUTO: 87 FL (ref 80–94)
PLATELET # BLD: 252 THOU/MM3 (ref 130–400)
PMV BLD AUTO: 8.1 FL (ref 9.4–12.4)
POTASSIUM SERPL-SCNC: 4.1 MEQ/L (ref 3.5–5.2)
RBC # BLD: 3.24 MILL/MM3 (ref 4.7–6.1)
SODIUM BLD-SCNC: 138 MEQ/L (ref 135–145)
WBC # BLD: 5.6 THOU/MM3 (ref 4.8–10.8)

## 2022-03-03 PROCEDURE — 3017F COLORECTAL CA SCREEN DOC REV: CPT | Performed by: NURSE PRACTITIONER

## 2022-03-03 PROCEDURE — 4004F PT TOBACCO SCREEN RCVD TLK: CPT | Performed by: NURSE PRACTITIONER

## 2022-03-03 PROCEDURE — 99214 OFFICE O/P EST MOD 30 MIN: CPT | Performed by: NURSE PRACTITIONER

## 2022-03-03 PROCEDURE — G8427 DOCREV CUR MEDS BY ELIG CLIN: HCPCS | Performed by: NURSE PRACTITIONER

## 2022-03-03 PROCEDURE — G8417 CALC BMI ABV UP PARAM F/U: HCPCS | Performed by: NURSE PRACTITIONER

## 2022-03-03 PROCEDURE — G8482 FLU IMMUNIZE ORDER/ADMIN: HCPCS | Performed by: NURSE PRACTITIONER

## 2022-03-03 PROCEDURE — 71046 X-RAY EXAM CHEST 2 VIEWS: CPT

## 2022-03-03 RX ORDER — AMITRIPTYLINE HYDROCHLORIDE 10 MG/1
20 TABLET, FILM COATED ORAL NIGHTLY
Status: ON HOLD | COMMUNITY
Start: 2022-01-10 | End: 2022-03-30 | Stop reason: HOSPADM

## 2022-03-03 ASSESSMENT — ENCOUNTER SYMPTOMS
DIARRHEA: 1
ABDOMINAL DISTENTION: 0
SHORTNESS OF BREATH: 1
COUGH: 0

## 2022-03-03 NOTE — PROGRESS NOTES
Heart Failure Clinic       Visit Date: 3/3/2022  Cardiologist:  Dr. Jennifer Bynum  Primary Care Physician: Dr. Iban Banks,     Momo Dominguez is a 63 y.o. male who presents today for:  Chief Complaint   Patient presents with    Congestive Heart Failure       HPI:   Momo Dominguez is a 63 y.o. male who presents to the office for a follow up patient visit in the heart failure clinic. Accompanied by no one  Lives w/ wife  Raising 10 and 15 yr old grd dtrs     TYPE HF: HFrecoveredEF (40-45% - improved to 55-60%)  Device: no  HX: HTN, DM, LAURIE (CPAP), CKD Kelly Shahzad), Partial colectomy (2017), never smoker (wife does)  CardioMEMS implanted 6/16/21 - PAD goal 19-25mmHg     Dry Wt:  350     Hospitalization:  March 2021 - CHF - SOB. + CXR for Pulm congestion.  BNP 1279.  BP meds adjusted.    12/1 = [facility] w/ Nallu = nonobstructive disease, elevated LVEDP, PCW 35mmHg - given Lasix 80 IV x 1  OV Pulmonary - 3/26 - PFTs = mild restriction likely 2/2 to obesity.   NIOX slightly elevated indicating inflammation - started on Flovent.   May 2021 - 361# on admission, 348# at d/c.  +CXR for congestion.  . Recent bump in creat with no recent med changes (from 1.2-1.3 to 2.2) --Called 2/25 and instructed to hold Entresto and Aldactone and use Bumex PRN  F/u today  Today: looks worn out today - bad night - couldn't get upstairs d/t pain. SOB w/ stairs   Wt slowly going down - OV 6 months ago was 350# - today 333#  Issues w/ Abd mesh - infected, poking through skin, draining. Has had increased diarrhea and decreased appetite past several weeks. Following w/ [facility] - seen 2 weeks ago - wants to take back to surgery   Been taking Bumex everyday (was suppose to do PRN) - not taking Entresto/Aldactone  Saturday had dizzy spell BP down to 70s, left chest pain, continues with pain but seemingly only w/ deep breath. Cough or sneeze- \"pain is almost unbearable\". Activity: walked from entrance -- winded.    CardioMems PAD readings have been pretty stable, very slight increase past week/so    Patient has:  Chest Pain: no  SOB: AKINS  Orthopnea/PND: yes   LAURIE: yes - compliant  Edema: no  Fatigue: yes   Abdominal bloating: no  Cough: no  Appetite: fair/poor  Home weight: slow decline  Home blood pressure: 110-130s    Past Medical History:   Diagnosis Date    Arthritis     Back problem     back pain-sees [facility] pain mgmt    Bladder disease     Dr. Carolee Mcdermott CHF with unknown LVEF ([facility] [address] 05/24/2021    Dr. George/CHF clinic    Constipation     Diabetes mellitus ([facility] [address]     Dr. Mariel Guerra Hypertension     Hypertensive emergency 4/11/2019    Hypertensive urgency 4/13/2019    Sleep apnea     has bipap-sees AZRA Joseph CNP    Thyroid disease      Past Surgical History:   Procedure Laterality Date    ABDOMEN SURGERY      APPENDECTOMY      CARDIAC CATHETERIZATION      no stents    CARPAL TUNNEL RELEASE Bilateral     COLONOSCOPY  2017    Dr. Cecilia Bowman, ESOPHAGUS     [address] [facility]      x3 surgeries with 14 repairs    KNEE SURGERY Right 1980's    cartilage    OR EXPLORATORY OF ABDOMEN N/A 2/5/2018    ABDOMINAL EXPLORATION WITH LYSIS OF COMPLICATED ADHESIONS WITH AN EXTENDED RIGHT COLON RESECTION performed by René Rehman MD at [facility] History   Problem Relation Age of Onset    Cancer Mother         breast    Heart Disease Father         bladder, lung    Cancer Father     Stroke Brother     Heart Attack Brother     Prostate Cancer Brother     Diabetes Paternal Grandmother     Arthritis Brother     High Blood Pressure Neg Hx      Social History     Tobacco Use    Smoking status: Former Smoker     Types: Pipe    Smokeless tobacco: Current User     Types: Chew    Tobacco comment: quit pipe over 30 yrs ago   Substance Use Topics    Alcohol use: No     Alcohol/week: 0.0 standard drinks     Comment: quit     Current Outpatient Medications   Medication Sig Dispense Refill    aspirin EC 81 MG EC tablet Take 1 tablet by mouth daily 90 tablet 1    bumetanide (BUMEX) 1 MG tablet Take 1 tablet by mouth daily AND 1 tablet as needed. 60 tablet 6    levothyroxine (SYNTHROID) 200 MCG tablet Take 1 tablet by mouth Daily 30 tablet 1    amitriptyline (ELAVIL) 10 MG tablet Take 10 mg by mouth nightly       spironolactone (ALDACTONE) 50 MG tablet Take 0.5 tablets by mouth daily 180 tablet 3    Fluticasone furoate-vilanterol (BREO ELLIPTA) 200-25 MCG/INH AEPB inhaler Inhale 1 puff into the lungs daily 3 each 3    montelukast (SINGULAIR) 10 MG tablet Take 1 tablet by mouth nightly 90 tablet 3    albuterol sulfate  (90 Base) MCG/ACT inhaler Inhale 2 puffs into the lungs every 6 hours as needed for Wheezing or Shortness of Breath 1 each 5    oxyCODONE-acetaminophen (PERCOCET)  MG per tablet Take 1 tablet by mouth every 8 hours as needed for Pain for up to 30 days. Intended supply: 30 days 90 tablet 0    CPAP Machine MISC by Does not apply route Please change bipap 17/13 cm H20. 1 each 0    rOPINIRole (REQUIP) 1 MG tablet Take 1 pill in afternoon and 2 at bedtime 270 tablet 1    doxepin (SINEQUAN) 25 MG capsule Take 2 capsules by mouth nightly 60 capsule 3    gabapentin (NEURONTIN) 300 MG capsule Take 1 capsule by mouth nightly for 30 days.  30 capsule 0    ibuprofen (ADVIL;MOTRIN) 200 MG tablet Take 400 mg by mouth every 6 hours as needed for Pain      sacubitril-valsartan (ENTRESTO) 49-51 MG per tablet Take 1 tablet by mouth 2 times daily 60 tablet 11    cloNIDine (CATAPRES) 0.2 MG tablet Take 1 tablet by mouth 3 times daily      JANUVIA 100 MG tablet Take 1 tablet by mouth daily      amLODIPine (NORVASC) 10 MG tablet Take 1 tablet by mouth daily      hydrALAZINE (APRESOLINE) 100 MG tablet Take 1 tablet by mouth every 8 hours 270 tablet 3    nitroGLYCERIN (NITROSTAT) 0.4 MG SL tablet Place 1 tablet under the tongue every 5 minutes as needed for Chest pain (up to 3 doses) 25 tablet 3    oxybutynin (DITROPAN XL) 10 MG extended release tablet Take 1 tablet by mouth daily 90 tablet 3    tamsulosin (FLOMAX) 0.4 MG capsule Take 1 capsule by mouth nightly 90 capsule 3    bismuth subsalicylate (PEPTO BISMOL) 262 MG/15ML suspension Take by mouth daily      glimepiride (AMARYL) 4 MG tablet Take 8 mg by mouth daily       carvedilol (COREG) 25 MG tablet Take 1 tablet by mouth 2 times daily 180 tablet 3    acetaminophen (TYLENOL) 500 MG tablet Take 1 tablet by mouth every 6 hours as needed for Pain 40 tablet 0    cyclobenzaprine (FLEXERIL) 10 MG tablet Take 10 mg by mouth 3 times daily as needed for Muscle spasms      azelastine (ASTELIN) 137 MCG/SPRAY nasal spray 1 spray by Nasal route as needed. Use in each nostril as directed       No current facility-administered medications for this visit. Allergies   Allergen Reactions    Shellfish-Derived Products Swelling     Tolerates IV dye without any problems      Pcn [Penicillins] Itching    Succinylcholine      Pseudocholinesterase Deficiency    Sulfa Antibiotics Itching    Morphine Nausea And Vomiting     \"head swimming, skin crawling\"    Tape [Adhesive Tape] Rash       SUBJECTIVE:   Review of Systems   Constitutional: Positive for fatigue. Negative for activity change and appetite change. Respiratory: Positive for shortness of breath. Negative for cough. Cardiovascular: Negative for chest pain, palpitations and leg swelling. Gastrointestinal: Positive for diarrhea. Negative for abdominal distention. Skin: Positive for wound. Neurological: Negative for weakness, light-headedness and headaches. Hematological: Negative for adenopathy. Psychiatric/Behavioral: Negative for sleep disturbance. OBJECTIVE:   Today's Vitals:  /80   Pulse 94   Ht 6' 1\" (1.854 m)   Wt (!) 333 lb 6.4 oz (151.2 kg)   SpO2 96%   BMI 43.99 kg/m²     Physical Exam  Vitals reviewed. Constitutional:       General: He is not in acute distress.      Appearance: Normal appearance. He is well-developed. He is not diaphoretic. Comments: Tired appearing   HENT:      Head: Normocephalic and atraumatic. Eyes:      Conjunctiva/sclera: Conjunctivae normal.   Neck:      Comments: No JVD  Cardiovascular:      Rate and Rhythm: Normal rate and regular rhythm. Heart sounds: Normal heart sounds. No murmur heard. Pulmonary:      Effort: Pulmonary effort is normal. No respiratory distress. Breath sounds: Normal breath sounds. No wheezing or rales. Abdominal:      General: Bowel sounds are normal. There is no distension. Palpations: Abdomen is soft. Tenderness: There is no abdominal tenderness. Musculoskeletal:         General: Normal range of motion. Cervical back: Normal range of motion and neck supple. Right lower leg: No edema. Left lower leg: No edema. Skin:     General: Skin is warm and dry. Capillary Refill: Capillary refill takes less than 2 seconds. Neurological:      Mental Status: He is alert and oriented to person, place, and time. Coordination: Coordination normal.   Psychiatric:         Behavior: Behavior normal.       Wt Readings from Last 3 Encounters:   03/03/22 (!) 333 lb 6.4 oz (151.2 kg)   02/15/22 (!) 338 lb 9.6 oz (153.6 kg)   02/15/22 (!) 334 lb 9.6 oz (151.8 kg)     BP Readings from Last 3 Encounters:   03/03/22 136/80   02/15/22 116/70   02/15/22 110/64     Pulse Readings from Last 3 Encounters:   03/03/22 94   02/15/22 101   02/15/22 102     Body mass index is 43.99 kg/m². ECHO:    Summary   Left ventricle size is normal.   Moderate concentric left ventricular hypertrophy.   There were no regional wall motion abnormalities.   Ejection fraction is visually estimated in the range of 55% to 60%.       Signature      ----------------------------------------------------------------   Electronically signed by Missy Sena MD (Interpreting   [id number]) on 05/25/2021 at 04:48 PM   ----------------------------------------------------------------     CATH/STRESS:   RIGHT HEART CATHETERIZATION:  1.  RA is 25.  2.  RV is 80/23, 27.  3.  PA is 82/44, 59.  4.  Wedge pressure is 35.  5.  LV is 188/28, 36.  6.  AO was 205/126, 160.  7.  AO saturation is 91%. 8.  PA saturation is 63%. 9.  Cardiac output by Cherelle method is 5.9. 10.  Cardiac index is 2.1.     CORONARY ANATOMY:  1.  Right coronary artery is a dominant vessel.  The vessel is quite  large and has mild luminal irregularities in the proximal, mid, and  distal portions of the vessel; otherwise, it is patent. 2.  Left main is patent, gives rise to LAD and left circumflex arteries. 3.  Left circumflex artery is patent without any significant disease. 4.  LAD is patent in the proximal portion, mid portion there is a 30% to  40% stenosis followed by mild luminal irregularities in the distal  portion of the LAD. 5.  LV gram was not performed due to renal dysfunction.     The patient tolerated the procedure well without any significant issues  or complications.  Hemostasis was achieved with a Vasc Band device.     SUMMARY:  1.  Elevated right heart pressures with elevated LVEPD. 2.  Patent coronaries.     RECOMMENDATIONS:  1.  IV diuresis. 2.  Monitor electrolytes. 3.  Optimize heart failure therapy. 4.  Weight loss advised. 5.  Optimize sleep apnea therapies.   6.  Follow up in clinic in one to two weeks to evaluate symptoms  further.        Gardenia Pelayo MD     D: 12/06/2020 21:57:38         Results reviewed:  BNP: No results found for: BNP  CBC:   Lab Results   Component Value Date    WBC 5.6 03/03/2022    RBC 3.24 03/03/2022    RBC 5.15 08/12/2011    HGB 8.9 03/03/2022    HCT 28.2 03/03/2022     03/03/2022     CMP:    Lab Results   Component Value Date     03/03/2022    K 4.1 03/03/2022    K 4.0 05/27/2021     03/03/2022    CO2 26 03/03/2022    BUN 23 03/03/2022    CREATININE 2.0 03/03/2022    LABGLOM 34 03/03/2022    GLUCOSE 136 03/03/2022    CALCIUM 8.7 03/03/2022     Hepatic Function Panel:    Lab Results   Component Value Date    ALKPHOS 76 04/15/2021    ALT 47 04/15/2021    AST 41 04/15/2021    PROT 8.3 04/15/2021    BILITOT 0.5 04/15/2021    BILIDIR <0.2 03/02/2021    LABALBU 4.4 04/15/2021     Magnesium:    Lab Results   Component Value Date    MG 2.1 07/21/2021     PT/INR:    Lab Results   Component Value Date    INR 1.14 06/16/2021     Lipids:    Lab Results   Component Value Date    TRIG 107 05/25/2021    HDL 35 05/25/2021    LDLCALC 96 05/25/2021       ASSESSMENT AND PLAN:   The patient's condition/symptoms are Stable: No clinical evidence of fluid overload today. Continue current medical regimen without changes at present time. Diagnosis Orders   1. CHF (congestive heart failure), NYHA class II, chronic, diastolic (HCC)  Basic Metabolic Panel    D-Dimer, Quantitative    CBC    XR CHEST (2 VW)     Continue:  GDMT:              ACE/ARB/ARNI - HOLD Entresto              BB - Coreg 25 BID              Diuretic - Bumex 1mg/day, Kcl 20/day (stop Kcl)  AA - Aldactone 25/day - HOLD  Vasodilator - Hydralazine 100 TID  Continue Current medications: Norvasc 10/day, ASA  Stable, appears Euvolemic/Hypovolemic -   Labs reviewed - creat bumped 2.1 - held diuretics x3 days and repeated - creat still up 2.2 - instructed hold Entresto, Aldactone and change Bumex PRN (hemisunderstood - has been taking Bumex daily)  Pt also has been slowly losing wt seemingly d/t diarrhea/decreased appetite. Apparently has infected mesh that needs removed (following w/ [facility])  +Pleuritic pain - ?PE vs infection - Unable do CT scan d/t creatinine. No Hx PE  Repeat blood work today - BMP, CBC, Ddimer  CXR today  BPs have also been trending lower than normal (110-130s) - BP meds weaned and/or stopped recently.     Pt to call [phone] office today to check on status/plan  F/U w/ Cardiology  F/U in clinic pending workup    ADDENDUM  Labs reviewed - creat 2.0 - Pt instructed to CHANGE Bumex to PRN  Hgb slight drop 8.9 from 10  Ddimer elevated at 3514  Spoke w/ Dr Last Benítez regarding pt - recommends eval at ED - have to r/o PE  Called and spoke to pt. Updated on all results. Informed of recommendation of ED eval to r/o PE - he understands - he states he will plan to have wife take him in AM (after grddtrs to school)  If worsening symptoms tonight told to go straight ED. Tolerating above noted HF meds, no ill side effects noted. Will continue to monitor kidney function and electrolytes. Will optimize as tolerated. Pt is compliant w/ medications. Total visit time of 40 minutes has been spent with patient on education of symptoms, management, medication, and plan of care; as well as review of chart: labs, ECHO, radiology reports, etc.   I personally spent more then 50% of the appt time face to face with the patient. · Daily weights  · Fluid restriction of 2 Liters per day  · Limit sodium in diet to around 5209-2026 mg/day  · Monitor BP  · Activity as tolerated     Patient was instructed to call the [facility] for any changes in symptoms as noted in AVS.      No follow-ups on file. or sooner if needed     Patient given educational materials - see patient instructions. We discussed the importance of weighing oneself and recording daily. We also discussed the importance of a low sodium diet, higher sodium foods to avoid and better low sodium food options. Patient verbalizes understanding of plan of care using teach back method, and is agreeable to the treatment plan.        Electronically signed by SAHIL Duarte CNP on 3/3/2022 at 4:57 PM

## 2022-03-03 NOTE — PATIENT INSTRUCTIONS
You may receive a survey regarding the care you received during your visit. Your input is valuable to us. We encourage you to complete and return your survey. We hope you will choose us in the future for your healthcare needs.     Continue:  · Continue current medications  · Daily weights and record  · Fluid restriction of 2 Liters per day  · Limit sodium in diet to around 9246-7803 mg/day  · Monitor BP  · Activity as tolerated     Call the Heart Failure Clinic for any of the following symptoms: [phone]   Weight gain of 2-3 pounds in 1 day or 5 pounds in 1 week   Increased shortness of breath   Shortness of breath while laying down   Cough   Chest pain   Swelling in feet, ankles or legs   Tenderness or bloating in the abdomen   Fatigue    Decreased appetite or nausea    Confusion   

## 2022-03-04 ENCOUNTER — TELEPHONE (OUTPATIENT)
Dept: SURGERY | Age: 62
End: 2022-03-04

## 2022-03-04 NOTE — TELEPHONE ENCOUNTER
Patient scheduled for surgery with Dr. Denise Muller on 03- at 12:30pm with an arrival of 11:00am.  Preop instructions mailed to patient. Surgery consent to be signed on arrival.  Cardiac clearance per Dr. Angelita Clark.

## 2022-03-08 ENCOUNTER — TELEPHONE (OUTPATIENT)
Dept: CARDIOLOGY CLINIC | Age: 62
End: 2022-03-08

## 2022-03-08 DIAGNOSIS — I50.32 CHF (CONGESTIVE HEART FAILURE), NYHA CLASS II, CHRONIC, DIASTOLIC (HCC): Primary | ICD-10-CM

## 2022-03-08 DIAGNOSIS — G89.4 CHRONIC PAIN SYNDROME: ICD-10-CM

## 2022-03-08 RX ORDER — GABAPENTIN 300 MG/1
300 CAPSULE ORAL NIGHTLY
Qty: 30 CAPSULE | Refills: 0 | Status: SHIPPED | OUTPATIENT
Start: 2022-03-08 | End: 2022-04-13 | Stop reason: SDUPTHER

## 2022-03-08 RX ORDER — OXYCODONE AND ACETAMINOPHEN 10; 325 MG/1; MG/1
1 TABLET ORAL EVERY 8 HOURS PRN
Qty: 90 TABLET | Refills: 0 | Status: SHIPPED | OUTPATIENT
Start: 2022-03-12 | End: 2022-04-13 | Stop reason: SDUPTHER

## 2022-03-08 NOTE — TELEPHONE ENCOUNTER
OARRS reviewed. UDS: + for  .gabapentin and oxycodone   Last seen: 1/17/2022.  Follow-up:   Future Appointments   Date Time Provider Ismael Haas   3/23/2022  1:00  [address], APRN - [address] [facility]   3/28/2022  2:30 PM Lashell Domínguez Radha Harold Med [facility]   4/12/2022 12:45 PM MD Saqib Zuluaga Uro [facility]   4/14/2022 11:30 AM Vandana Allen APRN - CNP N [facility] CHF [facility]   4/18/2022 12:15 PM SAHIL Cheema - CNP N [facility] Pain [facility]   5/17/2022  2:30 PM Bulmaro Bailey APRN - CNP [facility] [address]   7/7/2022  3:00 PM Elba Paulson MD N [facility] Heart [facility]   7/22/2022 11:20 AM MD RANGEL Chavira [facility]. [facility]

## 2022-03-08 NOTE — TELEPHONE ENCOUNTER
Spoke to patient has been taking bumex 1 in am daily , b/p creeping up since Sunday. Sun am  128/68 pm 146/75, Mon 143/78,today 146/70. Pt scheduled  for hernia mesh removal sx 3/25/22 with Dr. Consuelo Cotto. Pt asking if creatinine could be affected by drinking ensure with the high protien? Rosaile advise?

## 2022-03-08 NOTE — TELEPHONE ENCOUNTER
Call and check on Sharla Josue = was recommended eval at ED last week d/t elevated d-dimer and pain w/ breathing  CardioMEMs is little elevated - had changed him to PRN Bumex d/t recent bump in creatinine. Has he used any? How are BPs?

## 2022-03-08 NOTE — TELEPHONE ENCOUNTER
Could be, back off on Protein drinks. I will touch base w/ Dr Henry Carrero   Repeat BMP, CBC in 1 week.

## 2022-03-09 ENCOUNTER — TELEPHONE (OUTPATIENT)
Dept: NEPHROLOGY | Age: 62
End: 2022-03-09

## 2022-03-09 NOTE — TELEPHONE ENCOUNTER
----- Message from Roxana Vo MD sent at 3/8/2022  4:53 PM EST -----  Ok I will see him next week after his labs have resulted. Bill, Can I see him please mid next week?  ----- Message -----  From: SAHIL Bravo - CNP  Sent: 3/8/2022   4:48 PM EST  To: Roxana Vo MD    I ordered routine blood work on Ayla Arredondo 2 weeks ago = creat bumped up to 2.1 from his baseline 1.3. I told him hold Entresto, Aldactone, and Bumex (he did not stop Bumex 1/day) and repeated = no improvement - creat 2.2  I saw him last week in office - appears be having a lot issues w/ this abdominal mesh - poking out of skin, a lot bloody drainage, more diarrhea, no appetite, losing weight, drinking a lot protein drinks. Scheduled for surgery w/ [facility] 3/24. I added CBC w/ this most recent check - Hgb 8.9 (baseline 10-12), creatinine still 2.0  He has also had lower BPs, few even SBP 80s per pt - have had wean his down to Hydralazine 50 BID (from 100 TID)  Stopped Norvasc - still holding Coreg, Entresto, and Aldactone. BPs now  Tried holding Bumex - he notes after 3 days couldn't breath, +orthopnea, increased swelling - back on 1mg/day - he has CardioMEMs and his PAD pressures have increased some. I feel like he has something more going on causing this - seemingly this mesh. I have him repeating CBC/BMP next week and told him back off on protein drinks - not sure what else I can do??     Sorry so long  Thanks SHILA TIERNEY [facility]

## 2022-03-12 ASSESSMENT — ENCOUNTER SYMPTOMS
EYE REDNESS: 0
TROUBLE SWALLOWING: 0
COLOR CHANGE: 0
WHEEZING: 0
ALLERGIC/IMMUNOLOGIC NEGATIVE: 1
EYE PAIN: 0
EYES NEGATIVE: 1
VOICE CHANGE: 0
SINUS PRESSURE: 0
DIARRHEA: 1
SHORTNESS OF BREATH: 1
SORE THROAT: 0
STRIDOR: 0
CHOKING: 0
COUGH: 0
EYE DISCHARGE: 0
RHINORRHEA: 0
SINUS PAIN: 0
FACIAL SWELLING: 0
PHOTOPHOBIA: 0
ABDOMINAL PAIN: 1
CHEST TIGHTNESS: 0
EYE ITCHING: 0
APNEA: 1
BACK PAIN: 1

## 2022-03-16 ENCOUNTER — APPOINTMENT (OUTPATIENT)
Dept: GENERAL RADIOLOGY | Age: 62
DRG: 907 | End: 2022-03-16
Payer: MEDICARE

## 2022-03-16 ENCOUNTER — HOSPITAL ENCOUNTER (INPATIENT)
Age: 62
LOS: 14 days | Discharge: HOME | DRG: 907 | End: 2022-03-30
Attending: EMERGENCY MEDICINE | Admitting: INTERNAL MEDICINE
Payer: MEDICARE

## 2022-03-16 ENCOUNTER — APPOINTMENT (OUTPATIENT)
Dept: CT IMAGING | Age: 62
DRG: 907 | End: 2022-03-16
Payer: MEDICARE

## 2022-03-16 ENCOUNTER — APPOINTMENT (OUTPATIENT)
Dept: NUCLEAR MEDICINE | Age: 62
DRG: 907 | End: 2022-03-16
Payer: MEDICARE

## 2022-03-16 DIAGNOSIS — I50.9 CHF (NYHA CLASS III, ACC/AHA STAGE C) (HCC): ICD-10-CM

## 2022-03-16 DIAGNOSIS — R05.9 COUGH: ICD-10-CM

## 2022-03-16 DIAGNOSIS — N17.9 AKI (ACUTE KIDNEY INJURY) (HCC): ICD-10-CM

## 2022-03-16 DIAGNOSIS — S31.109A CHRONIC WOUND INFECTION OF ABDOMEN, INITIAL ENCOUNTER: Primary | ICD-10-CM

## 2022-03-16 DIAGNOSIS — L08.9 CHRONIC WOUND INFECTION OF ABDOMEN, INITIAL ENCOUNTER: Primary | ICD-10-CM

## 2022-03-16 LAB
ANION GAP SERPL CALCULATED.3IONS-SCNC: 17 MEQ/L (ref 8–16)
BASOPHILS # BLD: 0.8 %
BASOPHILS ABSOLUTE: 0 THOU/MM3 (ref 0–0.1)
BUN BLDV-MCNC: 26 MG/DL (ref 7–22)
CALCIUM SERPL-MCNC: 8.4 MG/DL (ref 8.5–10.5)
CHLORIDE BLD-SCNC: 101 MEQ/L (ref 98–111)
CO2: 20 MEQ/L (ref 23–33)
CREAT SERPL-MCNC: 2.3 MG/DL (ref 0.4–1.2)
D-DIMER QUANTITATIVE: ABNORMAL NG/ML FEU (ref 0–500)
EKG ATRIAL RATE: 116 BPM
EKG P AXIS: 19 DEGREES
EKG P-R INTERVAL: 150 MS
EKG Q-T INTERVAL: 352 MS
EKG QRS DURATION: 106 MS
EKG QTC CALCULATION (BAZETT): 489 MS
EKG R AXIS: 0 DEGREES
EKG T AXIS: 64 DEGREES
EKG VENTRICULAR RATE: 116 BPM
EOSINOPHIL # BLD: 4.9 %
EOSINOPHILS ABSOLUTE: 0.3 THOU/MM3 (ref 0–0.4)
ERYTHROCYTE [DISTWIDTH] IN BLOOD BY AUTOMATED COUNT: 16.7 % (ref 11.5–14.5)
ERYTHROCYTE [DISTWIDTH] IN BLOOD BY AUTOMATED COUNT: 52.4 FL (ref 35–45)
FLU A ANTIGEN: NEGATIVE
FLU B ANTIGEN: NEGATIVE
GFR SERPL CREATININE-BSD FRML MDRD: 29 ML/MIN/1.73M2
GLUCOSE BLD-MCNC: 160 MG/DL (ref 70–108)
GLUCOSE BLD-MCNC: 164 MG/DL (ref 70–108)
HCT VFR BLD CALC: 27.3 % (ref 42–52)
HEMOGLOBIN: 8.4 GM/DL (ref 14–18)
IMMATURE GRANS (ABS): 0.05 THOU/MM3 (ref 0–0.07)
IMMATURE GRANULOCYTES: 0.8 %
LACTIC ACID, SEPSIS: 1.9 MMOL/L (ref 0.5–1.9)
LACTIC ACID, SEPSIS: 2 MMOL/L (ref 0.5–1.9)
LYMPHOCYTES # BLD: 9.9 %
LYMPHOCYTES ABSOLUTE: 0.6 THOU/MM3 (ref 1–4.8)
MAGNESIUM: 1.7 MG/DL (ref 1.6–2.4)
MCH RBC QN AUTO: 26.3 PG (ref 26–33)
MCHC RBC AUTO-ENTMCNC: 30.8 GM/DL (ref 32.2–35.5)
MCV RBC AUTO: 85.3 FL (ref 80–94)
MONOCYTES # BLD: 7 %
MONOCYTES ABSOLUTE: 0.4 THOU/MM3 (ref 0.4–1.3)
NUCLEATED RED BLOOD CELLS: 0 /100 WBC
OSMOLALITY CALCULATION: 283.9 MOSMOL/KG (ref 275–300)
PLATELET # BLD: 272 THOU/MM3 (ref 130–400)
PMV BLD AUTO: 8.1 FL (ref 9.4–12.4)
POTASSIUM REFLEX MAGNESIUM: 2.8 MEQ/L (ref 3.5–5.2)
POTASSIUM SERPL-SCNC: 3.2 MEQ/L (ref 3.5–5.2)
PRO-BNP: 1534 PG/ML (ref 0–900)
PROCALCITONIN: 0.63 NG/ML (ref 0.01–0.09)
RBC # BLD: 3.2 MILL/MM3 (ref 4.7–6.1)
SARS-COV-2, NAAT: NOT  DETECTED
SEG NEUTROPHILS: 76.6 %
SEGMENTED NEUTROPHILS ABSOLUTE COUNT: 4.6 THOU/MM3 (ref 1.8–7.7)
SODIUM BLD-SCNC: 138 MEQ/L (ref 135–145)
TROPONIN T: 0.03 NG/ML
WBC # BLD: 6 THOU/MM3 (ref 4.8–10.8)

## 2022-03-16 PROCEDURE — 87040 BLOOD CULTURE FOR BACTERIA: CPT

## 2022-03-16 PROCEDURE — A9540 TC99M MAA: HCPCS | Performed by: EMERGENCY MEDICINE

## 2022-03-16 PROCEDURE — 80048 BASIC METABOLIC PNL TOTAL CA: CPT

## 2022-03-16 PROCEDURE — 84484 ASSAY OF TROPONIN QUANT: CPT

## 2022-03-16 PROCEDURE — 83605 ASSAY OF LACTIC ACID: CPT

## 2022-03-16 PROCEDURE — 85379 FIBRIN DEGRADATION QUANT: CPT

## 2022-03-16 PROCEDURE — 6370000000 HC RX 637 (ALT 250 FOR IP): Performed by: STUDENT IN AN ORGANIZED HEALTH CARE EDUCATION/TRAINING PROGRAM

## 2022-03-16 PROCEDURE — 87804 INFLUENZA ASSAY W/OPTIC: CPT

## 2022-03-16 PROCEDURE — 3430000000 HC RX DIAGNOSTIC RADIOPHARMACEUTICAL: Performed by: EMERGENCY MEDICINE

## 2022-03-16 PROCEDURE — 71045 X-RAY EXAM CHEST 1 VIEW: CPT

## 2022-03-16 PROCEDURE — 6360000002 HC RX W HCPCS: Performed by: STUDENT IN AN ORGANIZED HEALTH CARE EDUCATION/TRAINING PROGRAM

## 2022-03-16 PROCEDURE — 96374 THER/PROPH/DIAG INJ IV PUSH: CPT

## 2022-03-16 PROCEDURE — A9558 XE133 XENON 10MCI: HCPCS | Performed by: EMERGENCY MEDICINE

## 2022-03-16 PROCEDURE — 82043 UR ALBUMIN QUANTITATIVE: CPT

## 2022-03-16 PROCEDURE — 2580000003 HC RX 258: Performed by: EMERGENCY MEDICINE

## 2022-03-16 PROCEDURE — 99285 EMERGENCY DEPT VISIT HI MDM: CPT

## 2022-03-16 PROCEDURE — 93005 ELECTROCARDIOGRAM TRACING: CPT | Performed by: EMERGENCY MEDICINE

## 2022-03-16 PROCEDURE — 83880 ASSAY OF NATRIURETIC PEPTIDE: CPT

## 2022-03-16 PROCEDURE — 87635 SARS-COV-2 COVID-19 AMP PRB: CPT

## 2022-03-16 PROCEDURE — 6360000002 HC RX W HCPCS: Performed by: EMERGENCY MEDICINE

## 2022-03-16 PROCEDURE — 6370000000 HC RX 637 (ALT 250 FOR IP): Performed by: EMERGENCY MEDICINE

## 2022-03-16 PROCEDURE — 2580000003 HC RX 258: Performed by: STUDENT IN AN ORGANIZED HEALTH CARE EDUCATION/TRAINING PROGRAM

## 2022-03-16 PROCEDURE — 94760 N-INVAS EAR/PLS OXIMETRY 1: CPT

## 2022-03-16 PROCEDURE — 85025 COMPLETE CBC W/AUTO DIFF WBC: CPT

## 2022-03-16 PROCEDURE — 99223 1ST HOSP IP/OBS HIGH 75: CPT | Performed by: INTERNAL MEDICINE

## 2022-03-16 PROCEDURE — 71250 CT THORAX DX C-: CPT

## 2022-03-16 PROCEDURE — 36415 COLL VENOUS BLD VENIPUNCTURE: CPT

## 2022-03-16 PROCEDURE — 78582 LUNG VENTILAT&PERFUS IMAGING: CPT

## 2022-03-16 PROCEDURE — 83735 ASSAY OF MAGNESIUM: CPT

## 2022-03-16 PROCEDURE — 84132 ASSAY OF SERUM POTASSIUM: CPT

## 2022-03-16 PROCEDURE — 81001 URINALYSIS AUTO W/SCOPE: CPT

## 2022-03-16 PROCEDURE — 94640 AIRWAY INHALATION TREATMENT: CPT

## 2022-03-16 PROCEDURE — 93010 ELECTROCARDIOGRAM REPORT: CPT | Performed by: INTERNAL MEDICINE

## 2022-03-16 PROCEDURE — 84145 PROCALCITONIN (PCT): CPT

## 2022-03-16 PROCEDURE — 87205 SMEAR GRAM STAIN: CPT

## 2022-03-16 PROCEDURE — 2060000000 HC ICU INTERMEDIATE R&B

## 2022-03-16 PROCEDURE — 82948 REAGENT STRIP/BLOOD GLUCOSE: CPT

## 2022-03-16 RX ORDER — ACETAMINOPHEN 650 MG/1
650 SUPPOSITORY RECTAL EVERY 6 HOURS PRN
Status: DISCONTINUED | OUTPATIENT
Start: 2022-03-16 | End: 2022-03-30 | Stop reason: HOSPADM

## 2022-03-16 RX ORDER — DOXEPIN HYDROCHLORIDE 50 MG/1
50 CAPSULE ORAL NIGHTLY
Status: DISCONTINUED | OUTPATIENT
Start: 2022-03-16 | End: 2022-03-30 | Stop reason: HOSPADM

## 2022-03-16 RX ORDER — SODIUM CHLORIDE 0.9 % (FLUSH) 0.9 %
5-40 SYRINGE (ML) INJECTION EVERY 12 HOURS SCHEDULED
Status: DISCONTINUED | OUTPATIENT
Start: 2022-03-16 | End: 2022-03-30 | Stop reason: HOSPADM

## 2022-03-16 RX ORDER — POTASSIUM CHLORIDE 7.45 MG/ML
10 INJECTION INTRAVENOUS PRN
Status: DISCONTINUED | OUTPATIENT
Start: 2022-03-16 | End: 2022-03-30 | Stop reason: HOSPADM

## 2022-03-16 RX ORDER — DEXTROSE MONOHYDRATE 25 G/50ML
12.5 INJECTION, SOLUTION INTRAVENOUS PRN
Status: DISCONTINUED | OUTPATIENT
Start: 2022-03-16 | End: 2022-03-16 | Stop reason: CLARIF

## 2022-03-16 RX ORDER — ACETAMINOPHEN 325 MG/1
650 TABLET ORAL EVERY 6 HOURS PRN
Status: DISCONTINUED | OUTPATIENT
Start: 2022-03-16 | End: 2022-03-30 | Stop reason: HOSPADM

## 2022-03-16 RX ORDER — ONDANSETRON 4 MG/1
4 TABLET, ORALLY DISINTEGRATING ORAL EVERY 8 HOURS PRN
Status: DISCONTINUED | OUTPATIENT
Start: 2022-03-16 | End: 2022-03-30 | Stop reason: HOSPADM

## 2022-03-16 RX ORDER — ONDANSETRON 2 MG/ML
4 INJECTION INTRAMUSCULAR; INTRAVENOUS EVERY 6 HOURS PRN
Status: DISCONTINUED | OUTPATIENT
Start: 2022-03-16 | End: 2022-03-30 | Stop reason: HOSPADM

## 2022-03-16 RX ORDER — DEXTROSE MONOHYDRATE 50 MG/ML
100 INJECTION, SOLUTION INTRAVENOUS PRN
Status: DISCONTINUED | OUTPATIENT
Start: 2022-03-16 | End: 2022-03-30 | Stop reason: HOSPADM

## 2022-03-16 RX ORDER — MORPHINE SULFATE 2 MG/ML
4 INJECTION, SOLUTION INTRAMUSCULAR; INTRAVENOUS ONCE
Status: COMPLETED | OUTPATIENT
Start: 2022-03-16 | End: 2022-03-16

## 2022-03-16 RX ORDER — AMITRIPTYLINE HYDROCHLORIDE 10 MG/1
10 TABLET, FILM COATED ORAL NIGHTLY
Status: DISCONTINUED | OUTPATIENT
Start: 2022-03-16 | End: 2022-03-30 | Stop reason: HOSPADM

## 2022-03-16 RX ORDER — MONTELUKAST SODIUM 10 MG/1
10 TABLET ORAL NIGHTLY
Status: DISCONTINUED | OUTPATIENT
Start: 2022-03-16 | End: 2022-03-30 | Stop reason: HOSPADM

## 2022-03-16 RX ORDER — 0.9 % SODIUM CHLORIDE 0.9 %
30 INTRAVENOUS SOLUTION INTRAVENOUS ONCE
Status: COMPLETED | OUTPATIENT
Start: 2022-03-16 | End: 2022-03-16

## 2022-03-16 RX ORDER — OXYCODONE AND ACETAMINOPHEN 10; 325 MG/1; MG/1
1 TABLET ORAL EVERY 8 HOURS PRN
Status: DISCONTINUED | OUTPATIENT
Start: 2022-03-16 | End: 2022-03-26 | Stop reason: SDUPTHER

## 2022-03-16 RX ORDER — SODIUM CHLORIDE 9 MG/ML
25 INJECTION, SOLUTION INTRAVENOUS PRN
Status: DISCONTINUED | OUTPATIENT
Start: 2022-03-16 | End: 2022-03-30 | Stop reason: HOSPADM

## 2022-03-16 RX ORDER — BUDESONIDE AND FORMOTEROL FUMARATE DIHYDRATE 160; 4.5 UG/1; UG/1
2 AEROSOL RESPIRATORY (INHALATION) 2 TIMES DAILY
Status: DISCONTINUED | OUTPATIENT
Start: 2022-03-16 | End: 2022-03-30 | Stop reason: HOSPADM

## 2022-03-16 RX ORDER — ASPIRIN 81 MG/1
81 TABLET ORAL DAILY
Status: DISCONTINUED | OUTPATIENT
Start: 2022-03-16 | End: 2022-03-26

## 2022-03-16 RX ORDER — ASPIRIN 81 MG/1
324 TABLET, CHEWABLE ORAL ONCE
Status: COMPLETED | OUTPATIENT
Start: 2022-03-16 | End: 2022-03-16

## 2022-03-16 RX ORDER — POTASSIUM CHLORIDE 20 MEQ/1
40 TABLET, EXTENDED RELEASE ORAL ONCE
Status: DISCONTINUED | OUTPATIENT
Start: 2022-03-16 | End: 2022-03-17

## 2022-03-16 RX ORDER — ROPINIROLE 1 MG/1
1 TABLET, FILM COATED ORAL EVERY 24 HOURS
Status: DISCONTINUED | OUTPATIENT
Start: 2022-03-17 | End: 2022-03-30 | Stop reason: HOSPADM

## 2022-03-16 RX ORDER — BUMETANIDE 0.25 MG/ML
2 INJECTION, SOLUTION INTRAMUSCULAR; INTRAVENOUS ONCE
Status: DISCONTINUED | OUTPATIENT
Start: 2022-03-16 | End: 2022-03-16 | Stop reason: RX

## 2022-03-16 RX ORDER — POTASSIUM CHLORIDE 20 MEQ/1
40 TABLET, EXTENDED RELEASE ORAL PRN
Status: DISCONTINUED | OUTPATIENT
Start: 2022-03-16 | End: 2022-03-30 | Stop reason: HOSPADM

## 2022-03-16 RX ORDER — OXYBUTYNIN CHLORIDE 10 MG/1
10 TABLET, EXTENDED RELEASE ORAL DAILY
Status: DISCONTINUED | OUTPATIENT
Start: 2022-03-16 | End: 2022-03-30 | Stop reason: HOSPADM

## 2022-03-16 RX ORDER — CARVEDILOL 25 MG/1
25 TABLET ORAL 2 TIMES DAILY
Status: DISCONTINUED | OUTPATIENT
Start: 2022-03-16 | End: 2022-03-27

## 2022-03-16 RX ORDER — LINEZOLID 2 MG/ML
600 INJECTION, SOLUTION INTRAVENOUS EVERY 12 HOURS
Status: DISCONTINUED | OUTPATIENT
Start: 2022-03-16 | End: 2022-03-22

## 2022-03-16 RX ORDER — SODIUM CHLORIDE 0.9 % (FLUSH) 0.9 %
5-40 SYRINGE (ML) INJECTION PRN
Status: DISCONTINUED | OUTPATIENT
Start: 2022-03-16 | End: 2022-03-30 | Stop reason: HOSPADM

## 2022-03-16 RX ORDER — POLYETHYLENE GLYCOL 3350 17 G/17G
17 POWDER, FOR SOLUTION ORAL DAILY PRN
Status: DISCONTINUED | OUTPATIENT
Start: 2022-03-16 | End: 2022-03-27

## 2022-03-16 RX ORDER — FUROSEMIDE 10 MG/ML
80 INJECTION INTRAMUSCULAR; INTRAVENOUS ONCE
Status: COMPLETED | OUTPATIENT
Start: 2022-03-16 | End: 2022-03-16

## 2022-03-16 RX ORDER — XENON XE-133 10 MCI/1
6.8 GAS RESPIRATORY (INHALATION)
Status: COMPLETED | OUTPATIENT
Start: 2022-03-16 | End: 2022-03-16

## 2022-03-16 RX ORDER — CYCLOBENZAPRINE HCL 10 MG
10 TABLET ORAL 3 TIMES DAILY PRN
Status: DISCONTINUED | OUTPATIENT
Start: 2022-03-16 | End: 2022-03-30 | Stop reason: HOSPADM

## 2022-03-16 RX ORDER — ALBUTEROL SULFATE 90 UG/1
2 AEROSOL, METERED RESPIRATORY (INHALATION) EVERY 6 HOURS PRN
Status: DISCONTINUED | OUTPATIENT
Start: 2022-03-16 | End: 2022-03-30 | Stop reason: HOSPADM

## 2022-03-16 RX ORDER — HYDRALAZINE HYDROCHLORIDE 50 MG/1
100 TABLET, FILM COATED ORAL EVERY 8 HOURS SCHEDULED
Status: DISCONTINUED | OUTPATIENT
Start: 2022-03-16 | End: 2022-03-19

## 2022-03-16 RX ORDER — NICOTINE POLACRILEX 4 MG
15 LOZENGE BUCCAL PRN
Status: DISCONTINUED | OUTPATIENT
Start: 2022-03-16 | End: 2022-03-16 | Stop reason: CLARIF

## 2022-03-16 RX ORDER — ROPINIROLE 1 MG/1
2 TABLET, FILM COATED ORAL NIGHTLY
Status: DISCONTINUED | OUTPATIENT
Start: 2022-03-16 | End: 2022-03-30 | Stop reason: HOSPADM

## 2022-03-16 RX ADMIN — SODIUM CHLORIDE 1000 ML: 9 INJECTION, SOLUTION INTRAVENOUS at 17:33

## 2022-03-16 RX ADMIN — OXYCODONE AND ACETAMINOPHEN 1 TABLET: 10; 325 TABLET ORAL at 17:48

## 2022-03-16 RX ADMIN — ROPINIROLE HYDROCHLORIDE 2 MG: 1 TABLET, FILM COATED ORAL at 21:58

## 2022-03-16 RX ADMIN — CEFEPIME 2000 MG: 2 INJECTION, POWDER, FOR SOLUTION INTRAMUSCULAR; INTRAVENOUS at 18:49

## 2022-03-16 RX ADMIN — INSULIN LISPRO 2 UNITS: 100 INJECTION, SOLUTION INTRAVENOUS; SUBCUTANEOUS at 21:59

## 2022-03-16 RX ADMIN — Medication 3.1 MILLICURIE: at 16:50

## 2022-03-16 RX ADMIN — ASPIRIN 81 MG 324 MG: 81 TABLET ORAL at 14:14

## 2022-03-16 RX ADMIN — WATER 1000 MG: 1 INJECTION INTRAMUSCULAR; INTRAVENOUS; SUBCUTANEOUS at 15:19

## 2022-03-16 RX ADMIN — MORPHINE SULFATE 4 MG: 2 INJECTION, SOLUTION INTRAMUSCULAR; INTRAVENOUS at 15:20

## 2022-03-16 RX ADMIN — XENON XE-133 6.8 MILLICURIE: 10 GAS RESPIRATORY (INHALATION) at 16:43

## 2022-03-16 RX ADMIN — FUROSEMIDE 80 MG: 10 INJECTION, SOLUTION INTRAMUSCULAR; INTRAVENOUS at 13:55

## 2022-03-16 RX ADMIN — ENOXAPARIN SODIUM 40 MG: 100 INJECTION SUBCUTANEOUS at 21:58

## 2022-03-16 RX ADMIN — BUDESONIDE AND FORMOTEROL FUMARATE DIHYDRATE 2 PUFF: 160; 4.5 AEROSOL RESPIRATORY (INHALATION) at 21:08

## 2022-03-16 RX ADMIN — POTASSIUM CHLORIDE 40 MEQ: 1500 TABLET, EXTENDED RELEASE ORAL at 21:58

## 2022-03-16 RX ADMIN — LINEZOLID 600 MG: 600 INJECTION, SOLUTION INTRAVENOUS at 23:49

## 2022-03-16 ASSESSMENT — PAIN SCALES - GENERAL
PAINLEVEL_OUTOF10: 7
PAINLEVEL_OUTOF10: 7
PAINLEVEL_OUTOF10: 4
PAINLEVEL_OUTOF10: 4
PAINLEVEL_OUTOF10: 6
PAINLEVEL_OUTOF10: 7
PAINLEVEL_OUTOF10: 7

## 2022-03-16 ASSESSMENT — PAIN - FUNCTIONAL ASSESSMENT
PAIN_FUNCTIONAL_ASSESSMENT: 0-10

## 2022-03-16 ASSESSMENT — PAIN DESCRIPTION - LOCATION
LOCATION: BACK
LOCATION: OTHER (COMMENT)
LOCATION: BACK

## 2022-03-16 ASSESSMENT — PAIN DESCRIPTION - PAIN TYPE
TYPE: ACUTE PAIN
TYPE: ACUTE PAIN

## 2022-03-16 NOTE — ED NOTES
NM called back to confirm that patient can be added on today for testing, approximately around 1530.       Muna Alfaro RN  03/16/22 [id number]

## 2022-03-16 NOTE — ED NOTES
This nurse called NM to determine what was needed for patient to have testing completed. Left a message for a call back.       Mayur Alfaro RN  03/16/22 2550

## 2022-03-16 NOTE — Clinical Note
Discharge Plan[de-identified] Home with Office Follow-up   Telemetry/Cardiac Monitoring Required?: Yes   Bed request comments: Lazaro Keenan

## 2022-03-16 NOTE — ED NOTES
Pt back from NM. Fluids started at this time. Denies any needs. Pt back on 2 L NC after satting at 91%.       Alicja Perez RN  03/16/22 [id number]

## 2022-03-16 NOTE — ED PROVIDER NOTES
[facility] [[email or web address]] ICU STEPDOWN TELEMETRY 4K      CHIEF COMPLAINT       Chief Complaint   Patient presents with    Fever    Other     lung pain       Nurses Notes reviewed and I agree except as noted in the HPI. HISTORY OF PRESENT ILLNESS    Angelica Billingsley is a 63 y.o. male who presents with complaint of fever, nonproductive cough, lung pain whenever he coughs. Patient has been sick since Friday. He has been vaccinated fully against Covid. Patient was also hypoxic, he has no history of respiratory failure requiring oxygen supplementation. However, is currently on 2 L nasal cannula per nursing. Onset: Acute  Duration: 3 days  Timing: Persistent  Location of Pain: Lung pain with coughing  Intesity/severity: Moderate symptoms  Modifying Factors: History of heart failure  Relieved by;  Previous Episodes; Tx Before arrival: Oxygen supplementation and started in the ER  REVIEW OF SYSTEMS      Review of Systems   Constitutional: Negative for fever, chills, diaphoresis and fatigue. HENT: Negative for congestion, drooling, facial swelling and sore throat. Eyes: Negative for photophobia, pain and discharge. Respiratory: Positive for cough, shortness of breath; negative for wheezing and stridor. Cardiovascular: Negative for chest pain, palpitations and leg swelling. Gastrointestinal: Negative for abdominal pain, blood in stool and abdominal distention. Genitourinary: Negative for dysuria, urgency, hematuria and difficulty urinating. Musculoskeletal: Negative for gait problem, neck pain and neck stiffness. Skin; No rash, No itching  Neurological: Negative for seizures, weakness and numbness. Hematological: Negative for adenopathy. Does not bruise/bleed easily. Psychiatric/Behavioral: Negative for hallucinations, confusion and agitation.      PAST MEDICAL HISTORY    has a past medical history of Arthritis, Back problem, Bladder disease, CHF with unknown LVEF ([facility] [address] Constipation, Diabetes mellitus ([facility] [address] Hypertension, Hypertensive emergency, Hypertensive urgency, Sleep apnea, and Thyroid disease. SURGICAL HISTORY      has a past surgical history that includes Appendectomy; knee surgery (Right, 1980's); Carpal tunnel release (Bilateral); Colonoscopy (2017); hernia repair; pr exploratory of abdomen (N/A, 2/5/2018); Dilatation, esophagus; Abdomen surgery; and Cardiac catheterization. CURRENT MEDICATIONS       Current Discharge Medication List      CONTINUE these medications which have NOT CHANGED    Details   oxyCODONE-acetaminophen (PERCOCET)  MG per tablet Take 1 tablet by mouth every 8 hours as needed for Pain for up to 30 days. Intended supply: 30 days  Qty: 90 tablet, Refills: 0    Comments: Reduce doses taken as pain becomes manageable  Associated Diagnoses: Chronic pain syndrome      gabapentin (NEURONTIN) 300 MG capsule Take 1 capsule by mouth nightly for 30 days. Qty: 30 capsule, Refills: 0    Associated Diagnoses: Chronic pain syndrome      amitriptyline (ELAVIL) 10 MG tablet Take 20 mg by mouth nightly       spironolactone (ALDACTONE) 50 MG tablet Take 0.5 tablets by mouth daily  Qty: 180 tablet, Refills: 3      Fluticasone furoate-vilanterol (BREO ELLIPTA) 200-25 MCG/INH AEPB inhaler Inhale 1 puff into the lungs daily  Qty: 3 each, Refills: 3    Associated Diagnoses: Mild intermittent asthma without complication      montelukast (SINGULAIR) 10 MG tablet Take 1 tablet by mouth nightly  Qty: 90 tablet, Refills: 3    Associated Diagnoses: Mild intermittent asthma without complication; Wheezing      albuterol sulfate  (90 Base) MCG/ACT inhaler Inhale 2 puffs into the lungs every 6 hours as needed for Wheezing or Shortness of Breath  Qty: 1 each, Refills: 5    Associated Diagnoses: Mild intermittent asthma without complication      CPAP Machine MISC by Does not apply route Please change bipap 17/13 cm H20.   Qty: 1 each, Refills: 0      rOPINIRole (REQUIP) 1 MG tablet Take 1 pill in afternoon and 2 at bedtime  Qty: 270 tablet, Refills: 1      doxepin (SINEQUAN) 25 MG capsule Take 2 capsules by mouth nightly  Qty: 60 capsule, Refills: 3      ibuprofen (ADVIL;MOTRIN) 200 MG tablet Take 400 mg by mouth every 6 hours as needed for Pain      aspirin EC 81 MG EC tablet Take 1 tablet by mouth daily  Qty: 90 tablet, Refills: 1      sacubitril-valsartan (ENTRESTO) 49-51 MG per tablet Take 1 tablet by mouth 2 times daily  Qty: 60 tablet, Refills: 11      bumetanide (BUMEX) 1 MG tablet Take 1 tablet by mouth daily AND 1 tablet as needed. Qty: 60 tablet, Refills: 6      hydrALAZINE (APRESOLINE) 100 MG tablet Take 1 tablet by mouth every 8 hours  Qty: 270 tablet, Refills: 3      nitroGLYCERIN (NITROSTAT) 0.4 MG SL tablet Place 1 tablet under the tongue every 5 minutes as needed for Chest pain (up to 3 doses)  Qty: 25 tablet, Refills: 3      oxybutynin (DITROPAN XL) 10 MG extended release tablet Take 1 tablet by mouth daily  Qty: 90 tablet, Refills: 3      tamsulosin (FLOMAX) 0.4 MG capsule Take 1 capsule by mouth nightly  Qty: 90 capsule, Refills: 3      levothyroxine (SYNTHROID) 200 MCG tablet Take 1 tablet by mouth Daily  Qty: 30 tablet, Refills: 1      bismuth subsalicylate (PEPTO BISMOL) 262 MG/15ML suspension Take 15 mLs by mouth as needed for Diarrhea       glimepiride (AMARYL) 4 MG tablet Take 8 mg by mouth daily       carvedilol (COREG) 25 MG tablet Take 1 tablet by mouth 2 times daily  Qty: 180 tablet, Refills: 3      cyclobenzaprine (FLEXERIL) 10 MG tablet Take 10 mg by mouth 3 times daily as needed for Muscle spasms      azelastine (ASTELIN) 137 MCG/SPRAY nasal spray 1 spray by Nasal route as needed. Use in each nostril as directed             ALLERGIES     is allergic to shellfish-derived products, pcn [penicillins], succinylcholine, sulfa antibiotics, morphine, and tape [adhesive tape]. FAMILY HISTORY     He indicated that his mother is . He indicated that his father is .  He indicated that all of his four brothers are alive. He indicated that the status of his paternal grandmother is unknown. He indicated that the status of his neg hx is unknown.   family history includes Arthritis in his brother; Cancer in his father and mother; Diabetes in his paternal grandmother; Heart Attack in his brother; Heart Disease in his father; Prostate Cancer in his brother; Stroke in his brother. SOCIAL HISTORY      reports that he has quit smoking. His smoking use included pipe. His smokeless tobacco use includes chew. He reports that he does not drink alcohol and does not use drugs. PHYSICAL EXAM     INITIAL VITALS:  height is 6' 1\" (1.854 m) and weight is 327 lb (148.3 kg) (abnormal). His oral temperature is 98.9 °F (37.2 °C). His blood pressure is 138/63 and his pulse is 84. His respiration is 18 and oxygen saturation is 92%. Physical Exam   Constitutional:  well-developed and well-nourished. HENT: Head: Normocephalic, atraumatic, Bilateral external ears normal, Oropharynx mosit, No oral exudates, Nose normal.   Eyes: PERRL, EOMI, Conjunctiva normal, No discharge. No scleral icterus  Neck: Normal range of motion, No tenderness, Supple  Cardiovascular: Normal rate, regular rhythm, S1 normal and S2 normal.  Exam reveals no gallop. Pulmonary/Chest: Effort normal and breath sounds normal. No accessory muscle usage or stridor. No respiratory distress. no wheezes. has no rales. exhibits no tenderness. Abdominal: Soft. Bowel sounds are normal.  exhibits no distension. There is no tenderness. There is no rebound and no guarding. 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 Purulent drainage noted. Extremities: No edema, no tenderness, no cyanosis, no clubbing. Musculoskeletal: Good range of motion in major joints is observed. No major deformities noted.   Neurological: Alert and oriented ×3, normal motor function, normal sensory function, no focal deficits. GCS 15. Skin: Skin is warm, dry and intact. No rash noted. No erythema. Psychiatric: Affect normal, judgment normal, mood normal.  DIFFERENTIAL DIAGNOSIS:       DIAGNOSTIC RESULTS     EKG: All EKG's are interpreted by the Emergency Department Physician who either signs or Co-signs this chart in the absence of a cardiologist.      RADIOLOGY: non-plain film images(s) such as CT, Ultrasound and MRI are read by the radiologist.  Plain radiographic images are visualized and preliminarily interpreted by the emergency physician unless otherwise stated below.       LABS:   Labs Reviewed   CULTURE, ANAEROBIC AND AEROBIC - Abnormal; Notable for the following components:       Result Value    Organism Staphylococcus (coagulase positive) (*)     All other components within normal limits    Narrative:     Source: wound       Site: abdomen          Current Antibiotics: not stated   CBC WITH AUTO DIFFERENTIAL - Abnormal; Notable for the following components:    RBC 3.20 (*)     Hemoglobin 8.4 (*)     Hematocrit 27.3 (*)     MCHC 30.8 (*)     RDW-CV 16.7 (*)     RDW-SD 52.4 (*)     MPV 8.1 (*)     Lymphocytes Absolute 0.6 (*)     All other components within normal limits   BASIC METABOLIC PANEL W/ REFLEX TO MG FOR LOW K - Abnormal; Notable for the following components:    Potassium reflex Magnesium 2.8 (*)     CO2 20 (*)     Glucose 160 (*)     BUN 26 (*)     CREATININE 2.3 (*)     Calcium 8.4 (*)     All other components within normal limits   TROPONIN - Abnormal; Notable for the following components:    Troponin T 0.025 (*)     All other components within normal limits   BRAIN NATRIURETIC PEPTIDE - Abnormal; Notable for the following components:    Pro-BNP 1534.0 (*)     All other components within normal limits   LACTATE, SEPSIS - Abnormal; Notable for the following components:    Lactic Acid, Sepsis 2.0 (*)     All other components within normal limits   ANION GAP - Abnormal; Notable for the following components:    Anion Gap 17.0 (*)     All other components within normal limits   GLOMERULAR FILTRATION RATE, ESTIMATED - Abnormal; Notable for the following components:    Est, Glom Filt Rate 29 (*)     All other components within normal limits   PROCALCITONIN - Abnormal; Notable for the following components:    Procalcitonin 0.63 (*)     All other components within normal limits   D-DIMER, QUANTITATIVE - Abnormal; Notable for the following components:    D-Dimer, Quant 09932.00 (*)     All other components within normal limits   URINALYSIS WITH MICROSCOPIC - Abnormal; Notable for the following components:    Blood, Urine LARGE (*)     Protein,  (*)     Leukocyte Esterase, Urine TRACE (*)     All other components within normal limits   MICROALBUMIN / CREATININE URINE RATIO - Abnormal; Notable for the following components:    Microalb/Creat Ratio 1060 (*)     All other components within normal limits   BASIC METABOLIC PANEL - Abnormal; Notable for the following components:    Potassium 3.0 (*)     Glucose 65 (*)     BUN 28 (*)     CREATININE 2.3 (*)     All other components within normal limits   CBC - Abnormal; Notable for the following components:    RBC 3.08 (*)     Hemoglobin 7.9 (*)     Hematocrit 26.1 (*)     MCH 25.6 (*)     MCHC 30.3 (*)     RDW-CV 16.5 (*)     RDW-SD 51.0 (*)     MPV 8.3 (*)     All other components within normal limits   POTASSIUM - Abnormal; Notable for the following components:    Potassium 3.2 (*)     All other components within normal limits   POTASSIUM - Abnormal; Notable for the following components:    Potassium 3.3 (*)     All other components within normal limits   POTASSIUM - Abnormal; Notable for the following components:    Potassium 3.4 (*)     All other components within normal limits   GLOMERULAR FILTRATION RATE, ESTIMATED - Abnormal; Notable for the following components:    Est, Glom Filt Rate 29 (*)     All other components within normal limits   BASIC METABOLIC PANEL - Abnormal; Notable for the following components:    CO2 21 (*)     BUN 28 (*)     CREATININE 2.2 (*)     All other components within normal limits   GLOMERULAR FILTRATION RATE, ESTIMATED - Abnormal; Notable for the following components:    Est, Glom Filt Rate 31 (*)     All other components within normal limits   POCT GLUCOSE - Abnormal; Notable for the following components:    POC Glucose 164 (*)     All other components within normal limits   POCT GLUCOSE - Abnormal; Notable for the following components:    POC Glucose 118 (*)     All other components within normal limits   COVID-19, RAPID   RAPID INFLUENZA A/B ANTIGENS   CULTURE, BLOOD 1    Narrative:     Source: blood-Adult-suboptimal <5.5oz./set volume       Site: Peripheral Vein            Current Antibiotics: not stated   CULTURE, BLOOD 1    Narrative:     Source: blood-Adult-suboptimal <5.5oz./set volume       Site: Peripheral Vein            Current Antibiotics: not stated   LACTATE, SEPSIS   MAGNESIUM   OSMOLALITY   MAGNESIUM   PHOSPHORUS   ANION GAP   OSMOLALITY   SODIUM, URINE, RANDOM   ANION GAP   MRSA BY PCR   HEPATIC FUNCTION PANEL   PROTEIN / CREATININE RATIO, URINE   POCT GLUCOSE   POCT GLUCOSE   POCT GLUCOSE   POCT GLUCOSE   POCT GLUCOSE   POCT GLUCOSE   POCT GLUCOSE       EMERGENCY DEPARTMENT COURSE:   Vitals:    Vitals:    03/18/22 0509 03/18/22 0510 03/18/22 0818 03/18/22 0917   BP: 127/60  138/63    Pulse: 85  84    Resp: 19  18 18   Temp: 98.5 °F (36.9 °C)  98.9 °F (37.2 °C)    TempSrc: Oral  Oral    SpO2: 94%  93% 92%   Weight:  (!) 327 lb (148.3 kg)     Height:           Patient presenting with complaint of fever, shortness of breath and lung pain with coughing. History of CHF, no history of PE/DVT. Patient is fully vaccinated, state that his fever was 101.9 at home. Patient took ibuprofen prior to arrival, currently is afebrile.   Is on oxygen supplementation, no work of breathing, saturating well above 94% on 2 L nasal cannula. Infected purulence wound noted to lower umbilical region, this could be the source for the fever. Patient is on IV antibiotics, inpatient consult put in for Dr. Phill Archuleta. VQ scan negative for PE.  CT chest negative for pneumonia. CRITICAL CARE:       CONSULTS:  None    PROCEDURES:  None    FINAL IMPRESSION      1. Chronic wound infection of abdomen, initial encounter    2. Cough          DISPOSITION/PLAN   Admitted    PATIENT REFERRED TO:  No follow-up provider specified.     DISCHARGE MEDICATIONS:  Current Discharge Medication List          (Please note that portions of this note were completed with a voice recognition program.  Efforts were made to edit the dictations but occasionally words are mis-transcribed.)    Anca Nettles, DO Anca Nettles DO  03/18/22 9622

## 2022-03-16 NOTE — ED NOTES
Pt triaged and up to date. VSS.  Denies needs at this time      [facility] Sofía, RN  03/16/22 [id number]

## 2022-03-16 NOTE — ED NOTES
Patient presents to the ED for fever, SOB, and lung pain. He was told to come in by heart failure clinic for a possible blood clot in his lungs.       Tricia Garcia LPN  12/11/24 [id number]

## 2022-03-16 NOTE — H&P
History & Physical      Patient: Charisma Ochoa  YOB: 1960    MRN: [id number]  Acct: [de-identified]    PCP: Vern Boone DO    Date of Admission: 3/16/2022    Date of Service: Patient seen / examined on 03/16/22 and admitted to Inpatient with expected LOS greater than two midnights due to medical therapy. ASSESSMENT / PLAN:  Sepsis secondary to abdominal wound - present on arrival. SIRS 2/4 (Tachycardia and tachypnea). Abdominal wound is from dehiscence of hernia repair in 2018 with surgical mesh exposure, now with purulent discharge. Patient received 1 dose Ceftriaxone within the ED. - Blood cultures x2, CBC, lactic, urinalysis with microscopy, abdominal wound culture  - 30cc/kg of IBW fluid resuscitation  - Linezolid 600mg every 12 hours and Cefepime 2g every 12 hours  - Surgery consulted (Dr. Arlyn Ivan)    High anion gap metabolic acidosis - Secondary to sepsis and elevated lactic acid. - Plan as above    Acute hypoxic respiratory failure - Secondary to sepsis and metabolic acidosis  - Wean supplemental oxygen as tolerated to maintain SpO2>92%. - Plan as above    ? Pulmonary embolism - Pulmonary embolism was considered within the ED, but CTA not obtainable due to worsening renal function. V/Q scan was ordered within the ED. There is low suspicion for PE secondary to low-risk Well's score, chronic D-dimer elevation which now is more consistent with abdominal wound/infection. Patient denies lower extremity leg pain and denies recent immobilization.  - V/Q scan    Moderate Hypokalemia - Secondary to diuretic use.  Patient also received 80mg Lasix within the ED  - Potassium replacement followed by potassium labs 6 hours after adminstration  - Continuous telemetry    Stage 1 CAORLE on CKD 3a with renal progression to 3b - Renal progression appears to have occurred around 2/2022 and likely medication related with adjustments to home Bumex regimen.  - Patient administered 80mg of Lasix within ED; CAROLE will likely worsen over next 24 hours. - Avoid nephrotoxic agents and renally dose medications  - Daily standing weights, strict I/O  - Daily BMP     Elevated troponin - Secondary to type II demand ischemia. Patient denies chest pain, EKG without ST or T-wave abnormalities. - No indication to trend    Chronic systolic heart failure NYHA II - Without exacerbation. EF=55-60% on ECHO 5/25/21. Implanted CardioMEMS 6/16/2021. [address] 12/1/2020 demonstrating elevated LVEDP. Patient follows with CHF clinic.  - Patient not currently taking Entresto, spironolactone, carvedilol or Bumex per chart review and recommendations of outside provider per patient    COPD GOLD 2 - Without exacerbation. PFT 3/15/2021 FEV1= 75% of predicted. - Continue home Montelukast, Symbicort, Albuterol    NIDDM2 - Uncontrolled. A1c=9.9 (5/24/21)  - high dose ssi, POCT glucose 4x daily AC/HS, Hypoglycemia protocol  - Holding gabapentin for CAROLE  - Resume Elavil for neuropathic pain  - Inpatient BG goal 140-180    Primary HTN - Currently normotensive  - Patient is not taking home hydralazine per recommendations of outside provider  - Continue to monitor for hypertension and initiate anti-hypertensives    LAURIE - CPAP at home  - Resume home CPAP; if not available patient is no 17/13 cmH2O with 1 liter bleed in at night. BPH - Noted  - Holding Tamsulosin for CAROLE    Hx of hypothyroidism - Noted  - Resume Synthroid    Hx of chronic back pain - Noted  - Resume home percocet  - Hold Flexeril for category X interaction with Linezolid      Chief Complaint: Fever    History of Present Illness:  Sawyer Sanchez is a 63 y.o. male with PMHx of HTN, CHF, BPH, LAURIE, hypothyroidism, chronic back pain, CKD, and NIDDM2 who presented to [facility] with complaint of fever that began approximately 5 days prior to presentation that was self-reported as high as 101F. The patient had associated symptoms of diaphoresis.  He noted that he also has increased cough and shortness of breath from his baseline during these complaints. He has been taking Ibuprofen for his fever and OTC cough medicine for his cough which he states helps. The patient reported that his shortness of breath as well as his cough have been occurring for the last 4 months and have not worsened over that time period. He also stated that he is recently been instructed to hold his Bumex as well as his Entresto from his nephrologist and CHF clinic respectively. He denies headache, lightheadedness, change in vision, rhinorrhea, congestion, sore throat, cough, hemoptysis, chest pain, palpitations, abdominal pain, nausea, vomiting, hematemesis, change in bowel/bladder habits, hematochezia, hematuria, weakness, difficulty with ambulation, numbness/tingling, or known exposure to sick contacts. He admits to fever, chills, AKINS, PND, and shortness of breath. ED course: The patient presented with above referenced complaints. There was some concern for pulmonary embolism vs fluid overload and Lasix 80mg was administered as well as V/Q scan. CT chest was also performed. The patient received 1 dose of ceftriaxone for abdominal infection. Workup revealed: Na 138, K 2.8, Cl 101, CO2 20, BUN 26, Cr 2.3, Anion gap 17, eGFR 29, Mg 1.7, Lactic acid 2.0, Glucose 160, Calcium 8.4, Procal 0.63, Pro-BNP 1534, Trop 0.025, WBC 6, Hgb 8.4, HCT 27.3, MCV 85.3, Plt 272, D-dimer 39630. Flu A/B negative, COVID-19 negative  CT Chest  - Cardiomegaly, calcification of LAD and RCA, small right pleural effusion and splenonegaly    The patient was admitted to the hospital service for further care and management. Please see A&P above for additional information.     Past Medical History:  Past Medical History:   Diagnosis Date    Arthritis     Back problem     back pain-sees [facility] pain mgmt    Bladder disease     Dr. Jyoti Silva CHF with unknown LVEF ([facility] [address] 05/24/2021    Dr. George/CHF clinic    Constipation     Diabetes mellitus ([facility] [address]     Dr. Christie Hanson Hypertension     Hypertensive emergency 4/11/2019    Hypertensive urgency 4/13/2019    Sleep apnea     has bipap-sees AZRA Steele CNP    Thyroid disease        Past Surgical History:  Past Surgical History:   Procedure Laterality Date    ABDOMEN SURGERY      APPENDECTOMY      CARDIAC CATHETERIZATION      no stents    CARPAL TUNNEL RELEASE Bilateral     COLONOSCOPY  2017    Dr. Flores Clarke, ESOPHAGUS     [address]      x3 surgeries with 14 repairs    KNEE SURGERY Right 1980's    cartilage    OR EXPLORATORY OF ABDOMEN N/A 2/5/2018    ABDOMINAL EXPLORATION WITH LYSIS OF COMPLICATED ADHESIONS WITH AN EXTENDED RIGHT COLON RESECTION performed by Linda Michelle MD at [facility]       Medications Prior to Admission:  No current facility-administered medications on file prior to encounter. Current Outpatient Medications on File Prior to Encounter   Medication Sig Dispense Refill    oxyCODONE-acetaminophen (PERCOCET)  MG per tablet Take 1 tablet by mouth every 8 hours as needed for Pain for up to 30 days. Intended supply: 30 days 90 tablet 0    gabapentin (NEURONTIN) 300 MG capsule Take 1 capsule by mouth nightly for 30 days.  30 capsule 0    amitriptyline (ELAVIL) 10 MG tablet Take 10 mg by mouth nightly       spironolactone (ALDACTONE) 50 MG tablet Take 0.5 tablets by mouth daily (Patient not taking: Reported on 3/3/2022) 180 tablet 3    Fluticasone furoate-vilanterol (BREO ELLIPTA) 200-25 MCG/INH AEPB inhaler Inhale 1 puff into the lungs daily 3 each 3    montelukast (SINGULAIR) 10 MG tablet Take 1 tablet by mouth nightly 90 tablet 3    albuterol sulfate  (90 Base) MCG/ACT inhaler Inhale 2 puffs into the lungs every 6 hours as needed for Wheezing or Shortness of Breath 1 each 5    CPAP Machine MISC by Does not apply route Please change bipap 17/13 cm H20. 1 each 0    rOPINIRole (REQUIP) 1 MG tablet Take 1 pill in afternoon and 2 at bedtime 270 tablet 1    doxepin (SINEQUAN) 25 MG capsule Take 2 capsules by mouth nightly 60 capsule 3    ibuprofen (ADVIL;MOTRIN) 200 MG tablet Take 400 mg by mouth every 6 hours as needed for Pain      aspirin EC 81 MG EC tablet Take 1 tablet by mouth daily 90 tablet 1    sacubitril-valsartan (ENTRESTO) 49-51 MG per tablet Take 1 tablet by mouth 2 times daily (Patient not taking: Reported on 3/3/2022) 60 tablet 11    bumetanide (BUMEX) 1 MG tablet Take 1 tablet by mouth daily AND 1 tablet as needed. 60 tablet 6    JANUVIA 100 MG tablet Take 1 tablet by mouth daily      hydrALAZINE (APRESOLINE) 100 MG tablet Take 1 tablet by mouth every 8 hours (Patient not taking: Reported on 3/3/2022) 270 tablet 3    nitroGLYCERIN (NITROSTAT) 0.4 MG SL tablet Place 1 tablet under the tongue every 5 minutes as needed for Chest pain (up to 3 doses) 25 tablet 3    oxybutynin (DITROPAN XL) 10 MG extended release tablet Take 1 tablet by mouth daily 90 tablet 3    tamsulosin (FLOMAX) 0.4 MG capsule Take 1 capsule by mouth nightly 90 capsule 3    levothyroxine (SYNTHROID) 200 MCG tablet Take 1 tablet by mouth Daily 30 tablet 1    bismuth subsalicylate (PEPTO BISMOL) 262 MG/15ML suspension Take by mouth daily      glimepiride (AMARYL) 4 MG tablet Take 8 mg by mouth daily       carvedilol (COREG) 25 MG tablet Take 1 tablet by mouth 2 times daily (Patient not taking: Reported on 3/3/2022) 180 tablet 3    acetaminophen (TYLENOL) 500 MG tablet Take 1 tablet by mouth every 6 hours as needed for Pain 40 tablet 0    cyclobenzaprine (FLEXERIL) 10 MG tablet Take 10 mg by mouth 3 times daily as needed for Muscle spasms      azelastine (ASTELIN) 137 MCG/SPRAY nasal spray 1 spray by Nasal route as needed.  Use in each nostril as directed         Allergies:  Shellfish-derived products, Pcn [penicillins], Succinylcholine, Sulfa antibiotics, Morphine, and Tape Varghese Dura tape]    Social History:  Social History     Socioeconomic History    Marital status:      Spouse name: Ganesh Carmen Number of children: 2    Years of education: Not on file    Highest education level: Not on file   Occupational History    Not on file   Tobacco Use    Smoking status: Former Smoker     Types: Pipe    Smokeless tobacco: Current User     Types: Chew    Tobacco comment: quit pipe over 30 yrs ago   Vaping Use    Vaping Use: Never used   Substance and Sexual Activity    Alcohol use: No     Alcohol/week: 0.0 standard drinks     Comment: quit    Drug use: No    Sexual activity: Yes   Other Topics Concern    Not on file   Social History Narrative    Not on file     Social Determinants of Health     Financial Resource Strain:     Difficulty of Paying Living Expenses: Not on file   Food Insecurity:     Worried About Running Out of Food in the Last Year: Not on file    Manjit of Food in the Last Year: Not on file   Transportation Needs:     Lack of Transportation (Medical): Not on file    Lack of Transportation (Non-Medical):  Not on file   Physical Activity:     Days of Exercise per Week: Not on file    Minutes of Exercise per Session: Not on file   Stress:     Feeling of Stress : Not on file   Social Connections:     Frequency of Communication with Friends and Family: Not on file    Frequency of Social Gatherings with Friends and Family: Not on file    Attends Hinduism Services: Not on file    Active Member of [address] [place] [address] or Organizations: Not on file    Attends Club or Organization Meetings: Not on file    Marital Status: Not on file   Intimate Partner Violence:     Fear of Current or Ex-Partner: Not on file    Emotionally Abused: Not on file    Physically Abused: Not on file    Sexually Abused: Not on file   Housing Stability:     Unable to Pay for Housing in the Last Year: Not on file    Number of [place] in the Last Year: Not on file    Unstable Housing in the Last Year: Not on file       Family History:   Family History Problem Relation Age of Onset    Cancer Mother         breast    Heart Disease Father         bladder, lung    Cancer Father     Stroke Brother     Heart Attack Brother     Prostate Cancer Brother     Diabetes Paternal Grandmother     Arthritis Brother     High Blood Pressure Neg Hx        REVIEW OF SYSTEMS:  A 14-point ROS was obtained and negative, with the exception of pertinent positives as listed below:    Cough, fever, shortness of breath    PHYSICAL EXAM:  Vitals:    03/16/22 1146 03/16/22 1234 03/16/22 1400 03/16/22 1429   BP: 139/84 (!) 153/88 139/86    Pulse: 97 90 89    Resp: 21 (!) 36 17    Temp:       TempSrc:       SpO2: 95% 96% 94% 94%   Weight:       Height:         Physical Exam  Vitals and nursing note reviewed. Constitutional:       General: He is awake. Appearance: Normal appearance. He is morbidly obese. Interventions: Nasal cannula in place. HENT:      Head: Normocephalic and atraumatic. Right Ear: External ear normal.      Left Ear: External ear normal.      Nose: Nose normal.      Mouth/Throat:      Mouth: Mucous membranes are moist.      Pharynx: Oropharynx is clear. Eyes:      Conjunctiva/sclera: Conjunctivae normal.      Pupils: Pupils are equal, round, and reactive to light. Cardiovascular:      Rate and Rhythm: Normal rate and regular rhythm. Pulses: Normal pulses. Dorsalis pedis pulses are 2+ on the right side and 2+ on the left side. Posterior tibial pulses are 2+ on the right side and 2+ on the left side. Heart sounds: Normal heart sounds. Pulmonary:      Effort: Pulmonary effort is normal. No respiratory distress. Breath sounds: Decreased air movement present. Decreased breath sounds present. Abdominal:      General: A surgical scar is present. Bowel sounds are normal.      Palpations: Abdomen is soft. Tenderness: There is no abdominal tenderness. Hernia: A hernia is present.        Musculoskeletal: General: Normal range of motion. Cervical back: Normal range of motion and neck supple. Right lower leg: No edema. Left lower leg: No edema. Skin:     General: Skin is warm and dry. Capillary Refill: Capillary refill takes less than 2 seconds. Neurological:      General: No focal deficit present. Mental Status: He is alert and oriented to person, place, and time. Mental status is at baseline. GCS: GCS eye subscore is 4. GCS verbal subscore is 5. GCS motor subscore is 6. Psychiatric:         Attention and Perception: Attention and perception normal.         Mood and Affect: Mood and affect normal.         Speech: Speech normal.         Behavior: Behavior normal. Behavior is cooperative. Thought Content:  Thought content normal.         Cognition and Memory: Cognition and memory normal.         Judgment: Judgment normal.           Labs:  Results for orders placed or performed during the hospital encounter of 03/16/22   COVID-19, Rapid    Specimen: Nasopharyngeal Swab   Result Value Ref Range    SARS-CoV-2, NAAT NOT  DETECTED NOT DETECTED   Rapid influenza A/B antigens    Specimen: Nasopharyngeal   Result Value Ref Range    Flu A Antigen Negative NEGATIVE    Flu B Antigen Negative NEGATIVE   CBC with Auto Differential   Result Value Ref Range    WBC 6.0 4.8 - 10.8 thou/mm3    RBC 3.20 (L) 4.70 - 6.10 mill/mm3    Hemoglobin 8.4 (L) 14.0 - 18.0 gm/dl    Hematocrit 27.3 (L) 42.0 - 52.0 %    MCV 85.3 80.0 - 94.0 fL    MCH 26.3 26.0 - 33.0 pg    MCHC 30.8 (L) 32.2 - 35.5 gm/dl    RDW-CV 16.7 (H) 11.5 - 14.5 %    RDW-SD 52.4 (H) 35.0 - 45.0 fL    Platelets 586 637 - 912 thou/mm3    MPV 8.1 (L) 9.4 - 12.4 fL    Seg Neutrophils 76.6 %    Lymphocytes 9.9 %    Monocytes 7.0 %    Eosinophils 4.9 %    Basophils 0.8 %    Immature Granulocytes 0.8 %    Segs Absolute 4.6 1.8 - 7.7 thou/mm3    Lymphocytes Absolute 0.6 (L) 1.0 - 4.8 thou/mm3    Monocytes Absolute 0.4 0.4 - 1.3 thou/mm3 Eosinophils Absolute 0.3 0.0 - 0.4 thou/mm3    Basophils Absolute 0.0 0.0 - 0.1 thou/mm3    Immature Grans (Abs) 0.05 0.00 - 0.07 thou/mm3    nRBC 0 /100 wbc   Basic Metabolic Panel w/ Reflex to MG   Result Value Ref Range    Sodium 138 135 - 145 meq/L    Potassium reflex Magnesium 2.8 (L) 3.5 - 5.2 meq/L    Chloride 101 98 - 111 meq/L    CO2 20 (L) 23 - 33 meq/L    Glucose 160 (H) 70 - 108 mg/dL    BUN 26 (H) 7 - 22 mg/dL    CREATININE 2.3 (H) 0.4 - 1.2 mg/dL    Calcium 8.4 (L) 8.5 - 10.5 mg/dL   Troponin   Result Value Ref Range    Troponin T 0.025 (A) ng/ml   Brain Natriuretic Peptide   Result Value Ref Range    Pro-BNP 1534.0 (H) 0.0 - 900.0 pg/mL   Lactate, Sepsis   Result Value Ref Range    Lactic Acid, Sepsis 2.0 (H) 0.5 - 1.9 mmol/L   Anion Gap   Result Value Ref Range    Anion Gap 17.0 (H) 8.0 - 16.0 meq/L   Glomerular Filtration Rate, Estimated   Result Value Ref Range    Est, Glom Filt Rate 29 (A) ml/min/1.73m2   Magnesium   Result Value Ref Range    Magnesium 1.7 1.6 - 2.4 mg/dL   Osmolality   Result Value Ref Range    Osmolality Calc 283.9 275.0 - 300.0 mOsmol/kg       EKG / Radiology:    EKG:  Reviewed by me --    CXR:  Reviewed by me --    XR CHEST (2 VW)    Result Date: 3/3/2022  PROCEDURE: XR CHEST (2 VW) CLINICAL INFORMATION: CHF (congestive heart failure), NYHA class II, chronic, diastolic ([facility] [address] COMPARISON: Radiograph 5/24/2021. TECHNIQUE: 2 PA and one lateral view of the chest were obtained. FINDINGS: There are some opacities at the right lung base. There is cardiomegaly. The pulmonary vasculature is within normal limits. There is no significant pleural effusion or pneumothorax. Visualized portions of the upper abdomen are within normal limits. The osseous structures are intact. No acute fractures or suspicious osseous lesions. Patchy opacities at the right lung base which could be due to infiltrates or atelectasis. **This report has been created using voice recognition software.  It may CHEST PORTABLE    Result Date: 3/16/2022  PROCEDURE: XR CHEST PORTABLE CLINICAL INFORMATION: fever. COMPARISON: Chest x-ray dated third of March 2022. TECHNIQUE: AP upright view of the chest. FINDINGS: There is mild cardiomegaly. .The mediastinum is not widened. There are no pulmonary infiltrates or effusions. The pulmonary vascularity is increased. There is thoracic spondylosis. 1. Mild cardiomegaly and increased pulmonary vascularity. 2. Otherwise negative chest x-ray. . **This report has been created using voice recognition software. It may contain minor errors which are inherent in voice recognition technology. ** Final report electronically signed by  [facility] on 3/16/2022 11:53 AM      FEN/GI/DVT:  IVF: 30cc/kg Normal Saline  Electrolytes: Monitor and replace per protocols  Diet: Diabetic  GI PPX: No  DVT Prophylaxis: Lovenox    CODE STATUS:  Full    Active Hospital Problems    Diagnosis Date Noted    CAROLE (acute kidney injury) ([facility] [address] [N17.9] 03/16/2022     Thank you Moira Bourgeois DO for the opportunity to be involved in this patient's care.     Electronically signed by Pam Davison DO on 3/16/2022 at 3:26 PM

## 2022-03-17 LAB
ANION GAP SERPL CALCULATED.3IONS-SCNC: 14 MEQ/L (ref 8–16)
BACTERIA: ABNORMAL
BILIRUBIN URINE: NEGATIVE
BLOOD, URINE: ABNORMAL
BUN BLDV-MCNC: 28 MG/DL (ref 7–22)
CALCIUM SERPL-MCNC: 8.5 MG/DL (ref 8.5–10.5)
CASTS: ABNORMAL /LPF
CASTS: ABNORMAL /LPF
CHARACTER, URINE: CLEAR
CHLORIDE BLD-SCNC: 100 MEQ/L (ref 98–111)
CO2: 23 MEQ/L (ref 23–33)
COLOR: YELLOW
CREAT SERPL-MCNC: 2.3 MG/DL (ref 0.4–1.2)
CREATININE, URINE: 70.8 MG/DL
CRYSTALS: ABNORMAL
EPITHELIAL CELLS, UA: ABNORMAL /HPF
ERYTHROCYTE [DISTWIDTH] IN BLOOD BY AUTOMATED COUNT: 16.5 % (ref 11.5–14.5)
ERYTHROCYTE [DISTWIDTH] IN BLOOD BY AUTOMATED COUNT: 51 FL (ref 35–45)
GFR SERPL CREATININE-BSD FRML MDRD: 29 ML/MIN/1.73M2
GLUCOSE BLD-MCNC: 118 MG/DL (ref 70–108)
GLUCOSE BLD-MCNC: 65 MG/DL (ref 70–108)
GLUCOSE BLD-MCNC: 73 MG/DL (ref 70–108)
GLUCOSE BLD-MCNC: 95 MG/DL (ref 70–108)
GLUCOSE BLD-MCNC: 99 MG/DL (ref 70–108)
GLUCOSE, URINE: NEGATIVE MG/DL
HCT VFR BLD CALC: 26.1 % (ref 42–52)
HEMOGLOBIN: 7.9 GM/DL (ref 14–18)
KETONES, URINE: NEGATIVE
LEUKOCYTE ESTERASE, URINE: ABNORMAL
MAGNESIUM: 1.7 MG/DL (ref 1.6–2.4)
MCH RBC QN AUTO: 25.6 PG (ref 26–33)
MCHC RBC AUTO-ENTMCNC: 30.3 GM/DL (ref 32.2–35.5)
MCV RBC AUTO: 84.7 FL (ref 80–94)
MICROALBUMIN UR-MCNC: 75.07 MG/DL
MICROALBUMIN/CREAT UR-RTO: 1060 MG/G (ref 0–30)
MISCELLANEOUS LAB TEST RESULT: ABNORMAL
NITRITE, URINE: NEGATIVE
OSMOLALITY CALCULATION: 277.4 MOSMOL/KG (ref 275–300)
PH UA: 5.5 (ref 5–9)
PHOSPHORUS: 3.6 MG/DL (ref 2.4–4.7)
PLATELET # BLD: 273 THOU/MM3 (ref 130–400)
PMV BLD AUTO: 8.3 FL (ref 9.4–12.4)
POTASSIUM SERPL-SCNC: 3 MEQ/L (ref 3.5–5.2)
POTASSIUM SERPL-SCNC: 3.3 MEQ/L (ref 3.5–5.2)
POTASSIUM SERPL-SCNC: 3.4 MEQ/L (ref 3.5–5.2)
PROTEIN UA: 100 MG/DL
RBC # BLD: 3.08 MILL/MM3 (ref 4.7–6.1)
RBC URINE: > 100 /HPF
RENAL EPITHELIAL, UA: ABNORMAL
SODIUM BLD-SCNC: 137 MEQ/L (ref 135–145)
SODIUM URINE: < 20 MEQ/L
SPECIFIC GRAVITY UA: 1.01 (ref 1–1.03)
UROBILINOGEN, URINE: 0.2 EU/DL (ref 0–1)
WBC # BLD: 5.6 THOU/MM3 (ref 4.8–10.8)
WBC UA: ABNORMAL /HPF
YEAST: ABNORMAL

## 2022-03-17 PROCEDURE — 84300 ASSAY OF URINE SODIUM: CPT

## 2022-03-17 PROCEDURE — 94640 AIRWAY INHALATION TREATMENT: CPT

## 2022-03-17 PROCEDURE — 6360000002 HC RX W HCPCS: Performed by: STUDENT IN AN ORGANIZED HEALTH CARE EDUCATION/TRAINING PROGRAM

## 2022-03-17 PROCEDURE — 87186 SC STD MICRODIL/AGAR DIL: CPT

## 2022-03-17 PROCEDURE — 94660 CPAP INITIATION&MGMT: CPT

## 2022-03-17 PROCEDURE — 6370000000 HC RX 637 (ALT 250 FOR IP): Performed by: STUDENT IN AN ORGANIZED HEALTH CARE EDUCATION/TRAINING PROGRAM

## 2022-03-17 PROCEDURE — 94760 N-INVAS EAR/PLS OXIMETRY 1: CPT

## 2022-03-17 PROCEDURE — 2700000000 HC OXYGEN THERAPY PER DAY

## 2022-03-17 PROCEDURE — 51798 US URINE CAPACITY MEASURE: CPT

## 2022-03-17 PROCEDURE — 87077 CULTURE AEROBIC IDENTIFY: CPT

## 2022-03-17 PROCEDURE — 87075 CULTR BACTERIA EXCEPT BLOOD: CPT

## 2022-03-17 PROCEDURE — 2580000003 HC RX 258: Performed by: STUDENT IN AN ORGANIZED HEALTH CARE EDUCATION/TRAINING PROGRAM

## 2022-03-17 PROCEDURE — 82948 REAGENT STRIP/BLOOD GLUCOSE: CPT

## 2022-03-17 PROCEDURE — 6360000002 HC RX W HCPCS: Performed by: INTERNAL MEDICINE

## 2022-03-17 PROCEDURE — 99221 1ST HOSP IP/OBS SF/LOW 40: CPT | Performed by: SURGERY

## 2022-03-17 PROCEDURE — 84132 ASSAY OF SERUM POTASSIUM: CPT

## 2022-03-17 PROCEDURE — 2060000000 HC ICU INTERMEDIATE R&B

## 2022-03-17 PROCEDURE — 87070 CULTURE OTHR SPECIMN AEROBIC: CPT

## 2022-03-17 PROCEDURE — 6360000002 HC RX W HCPCS

## 2022-03-17 PROCEDURE — 87147 CULTURE TYPE IMMUNOLOGIC: CPT

## 2022-03-17 PROCEDURE — 83735 ASSAY OF MAGNESIUM: CPT

## 2022-03-17 PROCEDURE — 2580000003 HC RX 258: Performed by: INTERNAL MEDICINE

## 2022-03-17 PROCEDURE — 85027 COMPLETE CBC AUTOMATED: CPT

## 2022-03-17 PROCEDURE — 36415 COLL VENOUS BLD VENIPUNCTURE: CPT

## 2022-03-17 PROCEDURE — 99232 SBSQ HOSP IP/OBS MODERATE 35: CPT | Performed by: INTERNAL MEDICINE

## 2022-03-17 PROCEDURE — 84100 ASSAY OF PHOSPHORUS: CPT

## 2022-03-17 PROCEDURE — 80048 BASIC METABOLIC PNL TOTAL CA: CPT

## 2022-03-17 RX ORDER — POTASSIUM CHLORIDE 7.45 MG/ML
10 INJECTION INTRAVENOUS
Status: ACTIVE | OUTPATIENT
Start: 2022-03-17 | End: 2022-03-17

## 2022-03-17 RX ORDER — SODIUM CHLORIDE 9 MG/ML
INJECTION, SOLUTION INTRAVENOUS CONTINUOUS
Status: DISCONTINUED | OUTPATIENT
Start: 2022-03-17 | End: 2022-03-19

## 2022-03-17 RX ADMIN — HYDRALAZINE HYDROCHLORIDE 100 MG: 50 TABLET, FILM COATED ORAL at 00:34

## 2022-03-17 RX ADMIN — OXYBUTYNIN CHLORIDE 10 MG: 10 TABLET, EXTENDED RELEASE ORAL at 09:02

## 2022-03-17 RX ADMIN — HYDRALAZINE HYDROCHLORIDE 100 MG: 50 TABLET, FILM COATED ORAL at 09:02

## 2022-03-17 RX ADMIN — OXYCODONE AND ACETAMINOPHEN 1 TABLET: 10; 325 TABLET ORAL at 12:43

## 2022-03-17 RX ADMIN — POTASSIUM CHLORIDE 10 MEQ: 7.46 INJECTION, SOLUTION INTRAVENOUS at 07:35

## 2022-03-17 RX ADMIN — HYDRALAZINE HYDROCHLORIDE 100 MG: 50 TABLET, FILM COATED ORAL at 16:44

## 2022-03-17 RX ADMIN — OXYCODONE AND ACETAMINOPHEN 1 TABLET: 10; 325 TABLET ORAL at 20:41

## 2022-03-17 RX ADMIN — ACETAMINOPHEN 650 MG: 325 TABLET ORAL at 00:34

## 2022-03-17 RX ADMIN — MONTELUKAST SODIUM 10 MG: 10 TABLET ORAL at 23:30

## 2022-03-17 RX ADMIN — ROPINIROLE HYDROCHLORIDE 1 MG: 1 TABLET, FILM COATED ORAL at 12:40

## 2022-03-17 RX ADMIN — POTASSIUM CHLORIDE 10 MEQ: 7.46 INJECTION, SOLUTION INTRAVENOUS at 03:52

## 2022-03-17 RX ADMIN — CEFEPIME 2000 MG: 2 INJECTION, POWDER, FOR SOLUTION INTRAMUSCULAR; INTRAVENOUS at 05:57

## 2022-03-17 RX ADMIN — CARVEDILOL 25 MG: 25 TABLET, FILM COATED ORAL at 16:45

## 2022-03-17 RX ADMIN — BUDESONIDE AND FORMOTEROL FUMARATE DIHYDRATE 2 PUFF: 160; 4.5 AEROSOL RESPIRATORY (INHALATION) at 07:51

## 2022-03-17 RX ADMIN — ENOXAPARIN SODIUM 40 MG: 100 INJECTION SUBCUTANEOUS at 20:36

## 2022-03-17 RX ADMIN — POTASSIUM CHLORIDE 10 MEQ: 7.46 INJECTION, SOLUTION INTRAVENOUS at 09:04

## 2022-03-17 RX ADMIN — SODIUM CHLORIDE, PRESERVATIVE FREE 10 ML: 5 INJECTION INTRAVENOUS at 20:36

## 2022-03-17 RX ADMIN — LINEZOLID 600 MG: 600 INJECTION, SOLUTION INTRAVENOUS at 12:39

## 2022-03-17 RX ADMIN — SODIUM CHLORIDE, PRESERVATIVE FREE 10 ML: 5 INJECTION INTRAVENOUS at 08:52

## 2022-03-17 RX ADMIN — POTASSIUM CHLORIDE 10 MEQ: 7.46 INJECTION, SOLUTION INTRAVENOUS at 11:18

## 2022-03-17 RX ADMIN — BUDESONIDE AND FORMOTEROL FUMARATE DIHYDRATE 2 PUFF: 160; 4.5 AEROSOL RESPIRATORY (INHALATION) at 17:43

## 2022-03-17 RX ADMIN — ROPINIROLE HYDROCHLORIDE 2 MG: 1 TABLET, FILM COATED ORAL at 20:36

## 2022-03-17 RX ADMIN — POTASSIUM CHLORIDE 10 MEQ: 7.46 INJECTION, SOLUTION INTRAVENOUS at 04:52

## 2022-03-17 RX ADMIN — ENOXAPARIN SODIUM 40 MG: 100 INJECTION SUBCUTANEOUS at 09:04

## 2022-03-17 RX ADMIN — CARVEDILOL 25 MG: 25 TABLET, FILM COATED ORAL at 09:02

## 2022-03-17 RX ADMIN — CARVEDILOL 25 MG: 25 TABLET, FILM COATED ORAL at 00:34

## 2022-03-17 RX ADMIN — HYDRALAZINE HYDROCHLORIDE 100 MG: 50 TABLET, FILM COATED ORAL at 23:30

## 2022-03-17 RX ADMIN — ASPIRIN 81 MG: 81 TABLET, COATED ORAL at 09:03

## 2022-03-17 RX ADMIN — POTASSIUM BICARBONATE 40 MEQ: 782 TABLET, EFFERVESCENT ORAL at 16:44

## 2022-03-17 RX ADMIN — CEFEPIME 2000 MG: 2 INJECTION, POWDER, FOR SOLUTION INTRAMUSCULAR; INTRAVENOUS at 20:36

## 2022-03-17 RX ADMIN — SODIUM CHLORIDE: 9 INJECTION, SOLUTION INTRAVENOUS at 12:31

## 2022-03-17 RX ADMIN — MONTELUKAST SODIUM 10 MG: 10 TABLET ORAL at 00:34

## 2022-03-17 RX ADMIN — OXYCODONE AND ACETAMINOPHEN 1 TABLET: 10; 325 TABLET ORAL at 03:03

## 2022-03-17 RX ADMIN — POTASSIUM CHLORIDE 10 MEQ: 7.46 INJECTION, SOLUTION INTRAVENOUS at 06:36

## 2022-03-17 RX ADMIN — LEVOTHYROXINE SODIUM 175 MCG: 0.15 TABLET ORAL at 06:19

## 2022-03-17 RX ADMIN — LINEZOLID 600 MG: 600 INJECTION, SOLUTION INTRAVENOUS at 23:32

## 2022-03-17 ASSESSMENT — PAIN SCALES - GENERAL
PAINLEVEL_OUTOF10: 6
PAINLEVEL_OUTOF10: 0
PAINLEVEL_OUTOF10: 6
PAINLEVEL_OUTOF10: 5

## 2022-03-17 NOTE — CARE COORDINATION
3/17/22, 1:36 PM EDT  DISCHARGE PLANNING EVALUATION:    Melisa Murray       Admitted: 3/16/2022/ [address] day: 1   Location: [facility]16/016-A Reason for admit: CAROLE (acute kidney injury) ([facility] [address] [N17.9]   PMH:  has a past medical history of Arthritis, Back problem, Bladder disease, CHF with unknown LVEF ([facility] [address] Constipation, Diabetes mellitus ([facility] [address] Hypertension, Hypertensive emergency, Hypertensive urgency, Sleep apnea, and Thyroid disease. Barriers to Discharge:    CAROLE/Dehisced Abdominal Wound Sepsis (2018 hernia repair). Creatinine 2.3; monitor. Oxygen 2L, IV AB, IVF continued    PCP: Pedro Armijo DO  Readmission Risk Score: 19.5 ( )%    Patient Goals/Plan/Treatment Preferences: denied needs as plans home w spouse Eli Mariel independently as PTA when medically cleared; current w CHF/Wound Clinics; has BIPAP w 1L Oxygen bleed in at night; monitor AB plans  Transportation/Food Security/Housekeeping Addressed:  No issues identified.

## 2022-03-17 NOTE — PROGRESS NOTES
Pharmacy Medication History Note      List of current medications patient is taking is complete. Source of information: Patient    Changes made to medication list:  Medications removed (include reason, ex. therapy complete or physician discontinued):  Tayeuvia  Tylenol    Medications added/doses adjusted:  Changed spironolactone from 25 mg daily to 50 mg BID  Changed hydralazine from 100 mg TID to BID  Changed Pepto Bismol from daily to PRN diarrhea  Changed amitriptyline from 10 mg to 20 mg HS    Other notes (ex. Recent course of antibiotics, Coumadin dosing):  Denies use of other OTC or herbal medications.       Allergies reviewed      Electronically signed by Diana Cruz [facility] on 3/16/2022 at 9:02 PM

## 2022-03-17 NOTE — PLAN OF CARE
Problem: Impaired respiratory status  Goal: Clear lung sounds  Description: Clear lung sounds  Outcome: Ongoing  Note: Pt had no questions on purpose or side effects of medication   Will continue with maintenance medication as ordered

## 2022-03-17 NOTE — FLOWSHEET NOTE
03/17/22 1136   Treatment Team Notification   Reason for Communication Evaluate   Team Member Name Dr Ritesh Castro Provider   Method of Communication Secure Message   Response See orders   Gave cnsult for Creatine of 2.3 at 236 this morning & a potassium of 3.0. Received order for a urine sodium and 06/15/22 Normal saline at 40 Ml/hr.  No new orders received

## 2022-03-17 NOTE — PLAN OF CARE
Problem: Impaired respiratory status  Goal: Clear lung sounds  Description: Clear lung sounds  3/17/2022 1746 by Ayla Lopez RCP  Outcome: Ongoing  Note: Symbicort BID to improve breath sounds.

## 2022-03-17 NOTE — PROGRESS NOTES
Hospitalist Progress Note    Patient: Priyank Obregon  Unit/Bed: 4K-16/016-A  YOB: 1960  MRN: [id number]  Acct: [de-identified]    PCP: Bekah Aviles DO    Date of Admission: 3/16/2022    Assessment/Plan:    Sepsis secondary to abdominal wound - present on arrival. SIRS 2/4 (Tachycardia and tachypnea). Abdominal wound is from dehiscence of hernia repair in 2018 with surgical mesh exposure, now with purulent discharge. Patient received 1 dose Ceftriaxone within the ED. 30cc/kg fluid bolus completed  - Blood Cx and wound Cx NGTD  - Continue Linezolid and Cefepime; follow Cx for de-escalation  - Surgery consulted (Dr. Balbir Lal)    Acute hypoxic respiratory failure - Secondary to sepsis and metabolic acidosis  - Wean supplemental oxygen as tolerated to maintain SpO2>92%. - Plan as above     Moderate Hypokalemia - Improving and is now mild classification. Secondary to diuretic use. Patient also received 80mg Lasix within the ED. Magnesium and phosphorus within normal limits  - Continue potassium replacement  - Continuous telemetry     Stage 1 CAROLE on CKD 3a with renal progression to 3b - No significant change. Renal progression appears to have occurred around 2/2022 and likely medication related with adjustments to home Bumex regimen. Patient received 80mg Lasix within ED on arrival  - Avoid nephrotoxic agents and renally dose medications  - Daily standing weights, strict I/O  - Daily BMP     Elevated microalbumin/Creatinine ratio - 1060mg/g. Likely secondary to hypertension and diabetes. Patient follows with Dr. Ronit Pyle to nephrology    Elevated troponin - Secondary to type II demand ischemia. Patient denies chest pain, EKG without ST or T-wave abnormalities. - No indication to trend     Chronic systolic heart failure NYHA II - Without exacerbation. EF=55-60% on ECHO 5/25/21. Implanted CardioMEMS 6/16/2021. [address] 12/1/2020 demonstrating elevated LVEDP. Patient follows with CHF clinic.   - Patient not currently taking Entresto, spironolactone, carvedilol or Bumex per chart review and recommendations of outside provider per patient  - Will need CardioMEMS download to assess fluid status; will attempt to have this completed inpatient.     COPD GOLD 2 - Without exacerbation. PFT 3/15/2021 FEV1= 75% of predicted. - Continue home Montelukast, Symbicort, Albuterol     NIDDM2 - Uncontrolled. A1c=9.9 (5/24/21)  - high dose ssi, POCT glucose 4x daily AC/HS, Hypoglycemia protocol  - Holding gabapentin for CAROLE  - Resume Elavil for neuropathic pain  - Inpatient BG goal 140-180     Primary HTN - Currently normotensive  - Patient is not taking home hydralazine per recommendations of outside provider  - Continue to monitor for hypertension and initiate anti-hypertensives     LAURIE - CPAP at home  - Resume home CPAP; if not available patient is no 17/13 cmH2O with 1 liter bleed in at night.     BPH - Noted  - Holding Tamsulosin for CAROLE     Hx of hypothyroidism - Noted  - Resume Synthroid     Hx of chronic back pain - Noted  - Resume home percocet  - Hold Flexeril for category X interaction with Linezolid    High anion gap metabolic acidosis - Resolved. Secondary to sepsis and elevated lactic acid. ?Pulmonary embolism - Resolved. Pulmonary embolism was considered within the ED, but CTA not obtainable due to worsening renal function. V/Q scan demonstrated low probability of pulmonary embolism. Expected discharge date:  TBD    Disposition:    [x] Home       [] TCU       [] Rehab       [] Psych       [] SNF       [] [place]       [] Other -    Chief Complaint: Fever    Hospital Course:  Umang Guzmán is a 63 y.o. male with PMHx of HTN, CHF, BPH, LAURIE, hypothyroidism, chronic back pain, CKD, and NIDDM2 who presented to [facility] with complaint of fever that began approximately 5 days prior to presentation that was self-reported as high as 101F.  The patient had associated symptoms of diaphoresis. He noted that he also has increased cough and shortness of breath from his baseline during these complaints. He has been taking Ibuprofen for his fever and OTC cough medicine for his cough which he states helps. The patient reported that his shortness of breath as well as his cough have been occurring for the last 4 months and have not worsened over that time period. He also stated that he is recently been instructed to hold his Bumex as well as his Entresto from his nephrologist and CHF clinic respectively. He denies headache, lightheadedness, change in vision, rhinorrhea, congestion, sore throat, cough, hemoptysis, chest pain, palpitations, abdominal pain, nausea, vomiting, hematemesis, change in bowel/bladder habits, hematochezia, hematuria, weakness, difficulty with ambulation, numbness/tingling, or known exposure to sick contacts. He admits to fever, chills, AKINS, PND, and shortness of breath. Within the emergency department the patient was administered Lasix 80mg, ASA 324mg, 1 dose of Ceftriaxone 1g, and 4mg morphine, He also underwent CT of the chest which demonstrated borderline cardiomegaly, calcification of the LAD and RCA coronary arteries, small right pleural effusion and splenomegaly. The patient was admitted to the hospital service for further care and management. Orders were placed for general surgery consult. Subjective (past 24 hours): Per nursing, there were no acute events overnight. The patient stated that he was unable to sleep well due to hospital CPAP machine. His wife is going to bring his home CPAP machine today. The patient stated that he is still having difficulty taking in deep breaths and uncertain why. He also reported that his shortness breath has been worsening over the past month. Otherwise, he has no concerns or complaints. Review of Systems: 12 point review of systems completed and pertinent positives are noted in the HPI.  All other systems reviewed and negative. Medications:  Reviewed    Infusion Medications    sodium chloride      dextrose       Scheduled Medications    potassium chloride  10 mEq IntraVENous Q1H    sodium chloride flush  5-40 mL IntraVENous 2 times per day    enoxaparin  40 mg SubCUTAneous BID    cefepime  2,000 mg IntraVENous Q12H    linezolid  600 mg IntraVENous Q12H    [Held by provider] amitriptyline  10 mg Oral Nightly    aspirin EC  81 mg Oral Daily    [Held by provider] doxepin  50 mg Oral Nightly    budesonide-formoterol  2 puff Inhalation BID    montelukast  10 mg Oral Nightly    levothyroxine  175 mcg Oral Daily    rOPINIRole  1 mg Oral Q24H    oxybutynin  10 mg Oral Daily    insulin lispro  0-18 Units SubCUTAneous TID WC    insulin lispro  0-9 Units SubCUTAneous Nightly    rOPINIRole  2 mg Oral Nightly    carvedilol  25 mg Oral BID    hydrALAZINE  100 mg Oral 3 times per day     PRN Meds: sodium chloride flush, sodium chloride, ondansetron **OR** ondansetron, polyethylene glycol, acetaminophen **OR** acetaminophen, potassium chloride **OR** potassium alternative oral replacement **OR** potassium chloride, albuterol sulfate HFA, oxyCODONE-acetaminophen, [Held by provider] cyclobenzaprine, glucagon (rDNA), dextrose, glucose, dextrose bolus (hypoglycemia)      Intake/Output Summary (Last 24 hours) at 3/17/2022 0752  Last data filed at 3/17/2022 0645  Gross per 24 hour   Intake 740 ml   Output 1250 ml   Net -510 ml       Diet:  ADULT DIET; Regular; 4 carb choices (60 gm/meal); Low Sodium (2 gm)    Exam:  /67   Pulse 85   Temp 98.7 °F (37.1 °C) (Oral)   Resp 19   Ht 6' 1\" (1.854 m)   Wt (!) 334 lb (151.5 kg)   SpO2 97%   BMI 44.07 kg/m²     Physical Exam  Vitals and nursing note reviewed. Constitutional:       General: He is awake. Appearance: Normal appearance. He is morbidly obese. Interventions: Nasal cannula in place. HENT:      Head: Normocephalic and atraumatic.       Right Ear: External ear normal.      Left Ear: External ear normal.      Nose: Nose normal.      Mouth/Throat:      Mouth: Mucous membranes are moist.      Pharynx: Oropharynx is clear. Eyes:      Conjunctiva/sclera: Conjunctivae normal.      Pupils: Pupils are equal, round, and reactive to light. Cardiovascular:      Rate and Rhythm: Normal rate and regular rhythm. Pulses: Normal pulses. Dorsalis pedis pulses are 2+ on the right side and 2+ on the left side. Posterior tibial pulses are 2+ on the right side and 2+ on the left side. Heart sounds: Normal heart sounds. Pulmonary:      Effort: Pulmonary effort is normal.      Breath sounds: Normal breath sounds. Decreased air movement present. No wheezing. Abdominal:      General: A surgical scar is present. Bowel sounds are normal.      Palpations: Abdomen is soft. Tenderness: There is no abdominal tenderness. Musculoskeletal:         General: Normal range of motion. Cervical back: Normal range of motion and neck supple. Right lower leg: No edema. Left lower leg: No edema. Skin:     General: Skin is warm and dry. Capillary Refill: Capillary refill takes less than 2 seconds. Neurological:      General: No focal deficit present. Mental Status: He is alert and oriented to person, place, and time. Mental status is at baseline. GCS: GCS eye subscore is 4. GCS verbal subscore is 5. GCS motor subscore is 6. Psychiatric:         Attention and Perception: Attention and perception normal.         Mood and Affect: Mood and affect normal.         Speech: Speech normal.         Behavior: Behavior normal. Behavior is cooperative. Thought Content:  Thought content normal.         Cognition and Memory: Cognition and memory normal.         Judgment: Judgment normal.         Labs:  Recent Labs     03/16/22  1121 03/17/22  0236   WBC 6.0 5.6   HGB 8.4* 7.9*   HCT 27.3* 26.1*    273     Recent Labs 03/16/22  1121 03/16/22  1943 03/17/22  0236     --  137   K 2.8* 3.2* 3.0*     --  100   CO2 20*  --  23   BUN 26*  --  28*   CREATININE 2.3*  --  2.3*   CALCIUM 8.4*  --  8.5   PHOS  --   --  3.6     No results for input(s): AST, ALT, BILIDIR, BILITOT, ALKPHOS in the last 72 hours. No results for input(s): INR in the last 72 hours. No results for input(s): Bennetta Downy in the last 72 hours. Microbiology:    Urinalysis:   Lab Results   Component Value Date    NITRU NEGATIVE 03/16/2022    WBCUA 15-25 03/16/2022    BACTERIA NONE SEEN 03/16/2022    RBCUA > 100 03/16/2022    BLOODU LARGE 03/16/2022    SPECGRAV 1.011 03/16/2022    GLUCOSEU 500 04/13/2021       Radiology:  NM LUNG VENT/PERFUSION (VQ)   Final Result   Low probability of pulmonary embolus. Normal ventilation bilaterally with good washout and no areas of trapping. This document has been electronically signed by: Herminia Beckwith MD on    03/16/2022 05:52 PM      CT CHEST WO CONTRAST   Final Result       1. Borderline cardiomegaly. Calcification in the left anterior descending and right coronary arteries. 2. 13 mm metallic density in the left lower lobe pulmonary artery, new since previous CT scan of the chest dated 24th of May 2021. 3. Small right pleural effusion. 4. Small mediastinal lymph nodes. 5. Thickening off the interstitial lung markings. 6. Thoracic spondylosis. 7. Splenomegaly. 8. Small hiatal hernia. **This report has been created using voice recognition software. It may contain minor errors which are inherent in voice recognition technology. **      Final report electronically signed by DR Aris Krishna on 3/16/2022 12:47 PM      XR CHEST PORTABLE   Final Result   1. Mild cardiomegaly and increased pulmonary vascularity. 2. Otherwise negative chest x-ray. .               **This report has been created using voice recognition software.  It may contain minor errors which are inherent in voice

## 2022-03-18 ENCOUNTER — APPOINTMENT (OUTPATIENT)
Dept: ULTRASOUND IMAGING | Age: 62
DRG: 907 | End: 2022-03-18
Payer: MEDICARE

## 2022-03-18 ENCOUNTER — APPOINTMENT (OUTPATIENT)
Dept: GENERAL RADIOLOGY | Age: 62
DRG: 907 | End: 2022-03-18
Payer: MEDICARE

## 2022-03-18 LAB
ALBUMIN SERPL-MCNC: 2.9 G/DL (ref 3.5–5.1)
ALP BLD-CCNC: 104 U/L (ref 38–126)
ALT SERPL-CCNC: 19 U/L (ref 11–66)
ANION GAP SERPL CALCULATED.3IONS-SCNC: 16 MEQ/L (ref 8–16)
AST SERPL-CCNC: 15 U/L (ref 5–40)
BILIRUB SERPL-MCNC: 0.4 MG/DL (ref 0.3–1.2)
BILIRUBIN DIRECT: < 0.2 MG/DL (ref 0–0.3)
BUN BLDV-MCNC: 28 MG/DL (ref 7–22)
CALCIUM SERPL-MCNC: 8.5 MG/DL (ref 8.5–10.5)
CHLORIDE BLD-SCNC: 100 MEQ/L (ref 98–111)
CO2: 21 MEQ/L (ref 23–33)
CREAT SERPL-MCNC: 2.2 MG/DL (ref 0.4–1.2)
CREATININE URINE: 102.9 MG/DL
GFR SERPL CREATININE-BSD FRML MDRD: 31 ML/MIN/1.73M2
GLUCOSE BLD-MCNC: 119 MG/DL (ref 70–108)
GLUCOSE BLD-MCNC: 176 MG/DL (ref 70–108)
GLUCOSE BLD-MCNC: 84 MG/DL (ref 70–108)
GLUCOSE BLD-MCNC: 87 MG/DL (ref 70–108)
GLUCOSE BLD-MCNC: 88 MG/DL (ref 70–108)
GLUCOSE BLD-MCNC: 98 MG/DL (ref 70–108)
POTASSIUM SERPL-SCNC: 3.6 MEQ/L (ref 3.5–5.2)
PROT/CREAT RATIO, UR: 2.52
PROTEIN, URINE: 258.9 MG/DL
SODIUM BLD-SCNC: 137 MEQ/L (ref 135–145)
TOTAL PROTEIN: 7.3 G/DL (ref 6.1–8)
TROPONIN T: 0.01 NG/ML

## 2022-03-18 PROCEDURE — 76770 US EXAM ABDO BACK WALL COMP: CPT

## 2022-03-18 PROCEDURE — 6360000002 HC RX W HCPCS: Performed by: INTERNAL MEDICINE

## 2022-03-18 PROCEDURE — 71045 X-RAY EXAM CHEST 1 VIEW: CPT

## 2022-03-18 PROCEDURE — 82570 ASSAY OF URINE CREATININE: CPT

## 2022-03-18 PROCEDURE — 2060000000 HC ICU INTERMEDIATE R&B

## 2022-03-18 PROCEDURE — 99233 SBSQ HOSP IP/OBS HIGH 50: CPT | Performed by: INTERNAL MEDICINE

## 2022-03-18 PROCEDURE — 82948 REAGENT STRIP/BLOOD GLUCOSE: CPT

## 2022-03-18 PROCEDURE — 6370000000 HC RX 637 (ALT 250 FOR IP): Performed by: STUDENT IN AN ORGANIZED HEALTH CARE EDUCATION/TRAINING PROGRAM

## 2022-03-18 PROCEDURE — 84484 ASSAY OF TROPONIN QUANT: CPT

## 2022-03-18 PROCEDURE — 94760 N-INVAS EAR/PLS OXIMETRY 1: CPT

## 2022-03-18 PROCEDURE — 2700000000 HC OXYGEN THERAPY PER DAY

## 2022-03-18 PROCEDURE — 6360000002 HC RX W HCPCS: Performed by: STUDENT IN AN ORGANIZED HEALTH CARE EDUCATION/TRAINING PROGRAM

## 2022-03-18 PROCEDURE — 94761 N-INVAS EAR/PLS OXIMETRY MLT: CPT

## 2022-03-18 PROCEDURE — 36415 COLL VENOUS BLD VENIPUNCTURE: CPT

## 2022-03-18 PROCEDURE — 82248 BILIRUBIN DIRECT: CPT

## 2022-03-18 PROCEDURE — 2580000003 HC RX 258: Performed by: INTERNAL MEDICINE

## 2022-03-18 PROCEDURE — 84156 ASSAY OF PROTEIN URINE: CPT

## 2022-03-18 PROCEDURE — 2500000003 HC RX 250 WO HCPCS: Performed by: STUDENT IN AN ORGANIZED HEALTH CARE EDUCATION/TRAINING PROGRAM

## 2022-03-18 PROCEDURE — APPSS30 APP SPLIT SHARED TIME 16-30 MINUTES: Performed by: NURSE PRACTITIONER

## 2022-03-18 PROCEDURE — 80053 COMPREHEN METABOLIC PANEL: CPT

## 2022-03-18 PROCEDURE — 99223 1ST HOSP IP/OBS HIGH 75: CPT | Performed by: INTERNAL MEDICINE

## 2022-03-18 PROCEDURE — 93005 ELECTROCARDIOGRAM TRACING: CPT | Performed by: STUDENT IN AN ORGANIZED HEALTH CARE EDUCATION/TRAINING PROGRAM

## 2022-03-18 PROCEDURE — 94640 AIRWAY INHALATION TREATMENT: CPT

## 2022-03-18 RX ORDER — LIDOCAINE 4 G/G
1 PATCH TOPICAL DAILY
Status: DISCONTINUED | OUTPATIENT
Start: 2022-03-18 | End: 2022-03-23 | Stop reason: SDUPTHER

## 2022-03-18 RX ORDER — IPRATROPIUM BROMIDE AND ALBUTEROL SULFATE 2.5; .5 MG/3ML; MG/3ML
1 SOLUTION RESPIRATORY (INHALATION)
Status: DISCONTINUED | OUTPATIENT
Start: 2022-03-18 | End: 2022-03-19

## 2022-03-18 RX ADMIN — ROPINIROLE HYDROCHLORIDE 2 MG: 1 TABLET, FILM COATED ORAL at 19:56

## 2022-03-18 RX ADMIN — CEFEPIME 2000 MG: 2 INJECTION, POWDER, FOR SOLUTION INTRAMUSCULAR; INTRAVENOUS at 18:09

## 2022-03-18 RX ADMIN — OXYCODONE AND ACETAMINOPHEN 1 TABLET: 10; 325 TABLET ORAL at 05:10

## 2022-03-18 RX ADMIN — OXYBUTYNIN CHLORIDE 10 MG: 10 TABLET, EXTENDED RELEASE ORAL at 08:26

## 2022-03-18 RX ADMIN — ROPINIROLE HYDROCHLORIDE 1 MG: 1 TABLET, FILM COATED ORAL at 13:56

## 2022-03-18 RX ADMIN — BUDESONIDE AND FORMOTEROL FUMARATE DIHYDRATE 2 PUFF: 160; 4.5 AEROSOL RESPIRATORY (INHALATION) at 09:17

## 2022-03-18 RX ADMIN — MONTELUKAST SODIUM 10 MG: 10 TABLET ORAL at 19:56

## 2022-03-18 RX ADMIN — ONDANSETRON 4 MG: 2 INJECTION INTRAMUSCULAR; INTRAVENOUS at 15:59

## 2022-03-18 RX ADMIN — LEVOTHYROXINE SODIUM 175 MCG: 0.15 TABLET ORAL at 05:10

## 2022-03-18 RX ADMIN — SODIUM CHLORIDE: 9 INJECTION, SOLUTION INTRAVENOUS at 17:09

## 2022-03-18 RX ADMIN — ENOXAPARIN SODIUM 40 MG: 100 INJECTION SUBCUTANEOUS at 08:26

## 2022-03-18 RX ADMIN — CEFEPIME 2000 MG: 2 INJECTION, POWDER, FOR SOLUTION INTRAMUSCULAR; INTRAVENOUS at 05:12

## 2022-03-18 RX ADMIN — ASPIRIN 81 MG: 81 TABLET, COATED ORAL at 08:26

## 2022-03-18 RX ADMIN — LINEZOLID 600 MG: 600 INJECTION, SOLUTION INTRAVENOUS at 12:07

## 2022-03-18 RX ADMIN — ENOXAPARIN SODIUM 40 MG: 100 INJECTION SUBCUTANEOUS at 20:30

## 2022-03-18 RX ADMIN — HYDRALAZINE HYDROCHLORIDE 100 MG: 50 TABLET, FILM COATED ORAL at 08:26

## 2022-03-18 RX ADMIN — BUDESONIDE AND FORMOTEROL FUMARATE DIHYDRATE 2 PUFF: 160; 4.5 AEROSOL RESPIRATORY (INHALATION) at 17:39

## 2022-03-18 RX ADMIN — CARVEDILOL 25 MG: 25 TABLET, FILM COATED ORAL at 08:26

## 2022-03-18 RX ADMIN — HYDRALAZINE HYDROCHLORIDE 100 MG: 50 TABLET, FILM COATED ORAL at 15:58

## 2022-03-18 RX ADMIN — IPRATROPIUM BROMIDE AND ALBUTEROL SULFATE 1 AMPULE: .5; 3 SOLUTION RESPIRATORY (INHALATION) at 20:34

## 2022-03-18 RX ADMIN — CARVEDILOL 25 MG: 25 TABLET, FILM COATED ORAL at 16:50

## 2022-03-18 ASSESSMENT — PAIN SCALES - GENERAL: PAINLEVEL_OUTOF10: 6

## 2022-03-18 NOTE — CONSULTS
[address] [place] [address]                                  CONSULTATION    PATIENT NAME: Edison Lyon                   :        1960  MED REC NO:   [id number]                           ROOM:       [id number]  ACCOUNT NO:   [de-identified]                           ADMIT DATE: 2022  PROVIDER:     Remy Banks. Nabor Vanegas MD    CONSULT DATE:  2022    CHIEF COMPLAINT:  Chronic mesh infection. HISTORY OF PRESENT ILLNESS:  The patient is a complicated, -[facility]  [place], who I had first come in contact in  as he presented with  a small bowel obstruction. His history at that time is that he had had  multiple abdominal surgeries including multiple hernia repairs with mesh  performed elsewhere. The patient then continued with recurrent  small-bowel obstruction, also had chronic constipation, some of which  was due to chronic narcotic use due to back pain, but in 2018, was  taken to surgery for recurring small bowel obstructions and his chronic  constipation. At that time, the patient had severe adhesions. We  obviously had to cut through the mesh to get into the abdominal cavity,  and we did perform an extended right colon resection. He also had two  small bowel enterotomies at that time. The patient had a nemo course  postop, but he has been followed in the office. He has had a nonhealing  wound that I suspect it could possibly be infected mesh, but it would  heal, then break back open, and seep, heal, then break back open and  seep. Because of his significant medical issues, I felt that as long as  the mesh was not exposed, he could be followed. However, over the  course of the last several weeks, he has had more drainage. The mesh  would become exposed, and he was seen in the office 1 month ago.   The  patient presented to the emergency room with tachycardia, shortness of  breath, and has been admitted to the Medicine service. He clearly has  mesh exposed and is draining from the wound and Surgery has been  consulted. The patient unfortunately again has a history of hypoxic  respiratory failure, renal disease, chronic systolic heart failure,  COPD, diabetes, hypertension, sleep apnea, hypothyroidism, and again the  chronic back pain. SURGERIES:  Include again an appendectomy in the past.  He has had  carpal tunnel release. He has had colonoscopies and EGDs. He has had  hernia repairs amongst his 15 abdominal surgeries and then the surgery I  performed in 02/2018, it did include an extended right colon resection. MEDICATIONS:  Include the Neurontin, Elavil, Aldactone, Ellipta,  Singulair, albuterol, Requip, Sinequan, Entresto, Bumex, Apresoline,  Ditropan, Flomax, Synthroid, Amaryl, Coreg, Flexeril, Astelin nasal  spray. ALLERGIES:  TO SHELLFISH, PENICILLIN, SUCCINYLCHOLINE, SULFA  ANTIBIOTICS, MORPHINE. SOCIAL HISTORY:  The patient is . He is a former smoker but has  quit. He denies any alcohol use. REVIEW OF SYSTEMS:  10-point review of systems is otherwise negative. FAMILY HISTORY:  Positive for breast cancer in mother, coronary artery  disease, peripheral vascular disease; coronary artery disease and  prostate cancer in his brother. PHYSICAL EXAMINATION:  GENERAL:  The patient is a 80-year-old white male. He is resting in  bed. HEAD, EARS, EYES, NOSE, AND THROAT:  Show no scleral icterus. CARDIOVASCULAR:  S1, S2.  LUNGS:  Respirations are distant. ABDOMEN:  He is morbidly obese and he has a wound in the midline. He  has obvious mesh exposed, it is discolored and draining fluid that has  been cultured. Otherwise the abdomen is soft. EXTREMITIES:  Lower extremities show peripheral edema in the lower  extremities. LABORATORY DATA:  Revealed a white count of 6.0 yesterday, was 5.6  today. He is chronically anemic over the last year or so with a current  hemoglobin of 7.9.   He had a D-dimer of 13,055. DIAGNOSTIC DATA:  CT of the chest yesterday did show cardiomegaly,  pleural effusions, small mediastinal lymph nodes and splenomegaly. ASSESSMENT AND PLAN:  Exposed mesh that is obviously infected. The  patient is however a very very poor surgical risk, but we may have no  choice; however, this would be a very large operation and given his  medical issues, I have some real concerns that he would do well. Nonetheless, he is on antibiotics. We will continue to monitor. He  likely will require surgery. KIET LYLES [facility], MD    D: 03/17/2022 22:23:44       T: 03/17/2022 22:27:36     [email or web address]  Job#: [id number]     Doc#: [id number]    CC:

## 2022-03-18 NOTE — PLAN OF CARE
Problem: Pain:  Goal: Control of acute pain  Description: Control of acute pain  Outcome: Met This Shift     Problem: Pain:  Goal: Control of chronic pain  Description: Control of chronic pain  Outcome: Met This Shift     Problem: Falls - Risk of:  Goal: Will remain free from falls  Description: Will remain free from falls  Outcome: Met This Shift     Problem: Falls - Risk of:  Goal: Absence of physical injury  Description: Absence of physical injury  Outcome: Met This Shift     Problem: Skin Integrity:  Goal: Will show no infection signs and symptoms  Description: Will show no infection signs and symptoms  Outcome: Met This Shift     Problem: Skin Integrity:  Goal: Absence of new skin breakdown  Description: Absence of new skin breakdown  Outcome: Met This Shift     Problem: Pain:  Goal: Pain level will decrease  Description: Pain level will decrease  Outcome: Ongoing  Note: Chronic pain.

## 2022-03-18 NOTE — FLOWSHEET NOTE
[place] [address] PROGRESS NOTE      Patient: Charisma Ochoa  Room #: 8T-25/700-Q            YOB: 1960  Age: 63 y.o. Gender: male            Admit Date & Time: 3/16/2022 10:41 AM    Assessment:  Vinny Major is a 63year old male who is in bed on 4k. He explained to this  how he felt dizzy and decided to go back to bed. He spoke of his hernia repair and the need to do something to replace the mash. Vinny Major did not have an family at this time. Interventions: He welcomed prayer for his healing and for him to feel better    Outcomes:  encouraged    Plan:    1. Care Plan:  Continue spiritual and emotional care for patient and family. Including prayers.      Electronically signed by Deja Reese on 3/18/2022 at [address]  [phone]

## 2022-03-18 NOTE — CARE COORDINATION
3/18/22, 12:59 PM EDT    DISCHARGE ON [place] day: 2  Location: -16/016-A Reason for admit: CAROLE (acute kidney injury) ([facility] [address] [N17.9]     Barriers to Discharge:   CAROLE/Dehisced Abdominal Wound Sepsis/Chronic Mesh Infection (2/6/8 SBO w Colon Resection/JENNY).    Abdominal Culture: Staph, Creatinine 2.2; monitor. Cardiology consult for cardiomems, history CHF, mild LAD, pre-op eval. Nephrology following for CAROLE.  SGY will re-eval 3/21, Monday for surgical needs (infected mesh; plan was 3/25 prior)    IV AB continued    PCP: Zac Has,   Readmission Risk Score: 18.4 ( )%    Patient Goals/Plan/Treatment Preferences: plans home w spouse Johanna Boothe independently as PTA when medically cleared; current w CHF/Wound Clinics; has BIPAP w 1L Oxygen bleed in at night; monitor AB plans

## 2022-03-18 NOTE — CONSULTS
[facility]    [address], [address]  [facility]  3/18/2022 9:06 AM    Pt Name:    Americo Carrington  MRN:     [id number]   [id number]  YOB: 1960  Admit Date:    3/16/2022 10:41 AM  Primary Care Physician:  Marlyn Weldon DO    [facility] Number:   [id number]    Reason for Consult:  Acute kidney injury  Requesting provider:  Dr. Sharri Pruitt    History:   The patient is a 63 y.o. pleasant white male with history of hypertension, previous hernia surgeries, diabetes, obesity, thyroid disorder, Hx of previous small bowel obstructions, chronic diarrhea, chronic hypokalemia secondary to GI losses, chronic diastolic CHF (was previously on Bumex, Aldactone and Entresto) who was admitted to the hospital with complaints of cough, shortness of breath, fever. Apparently some of his symptoms have been present for a long time but recently gotten worse. Recently patient has also been having some drainage from area abdominal wound. He has history of multiple abdominal surgeries as well as previous hernia repair. He has had recurrent small bowel obstructions. Patient reports some drainage from his abdominal wound. He has been evaluated by surgery. He is currently on antibiotics. Nephrology was consulted due to elevated serum creatinine. Baseline serum creatinine typically runs around 1.3 but since vibrates been around 2.1-2.2. Started him on IV fluids yesterday. Serum creatinine yesterday was 2.3 and today is down to 2.2. He denies any worsening shortness of breath. Currently on nasal cannula. No hematuria. No dysuria.     Past Medical History:  Past Medical History:   Diagnosis Date    Arthritis     Back problem     back pain-sees [facility] pain mgmt    Bladder disease     Dr. Jessika Bahena CHF with unknown LVEF ([facility] [address] 05/24/2021    Dr. George/CHF clinic    Constipation     Diabetes mellitus ([facility]     Dr. Funmilayo Mckay Hypertension     Hypertensive emergency 4/11/2019    Hypertensive urgency 4/13/2019    Sleep apnea     has bipap-sees [facility] CNP    Thyroid disease        Past Surgical History:  Past Surgical History:   Procedure Laterality Date    ABDOMEN SURGERY      APPENDECTOMY      CARDIAC CATHETERIZATION      no stents    CARPAL TUNNEL RELEASE Bilateral     COLONOSCOPY  2017    Dr. Lee Weldon, ESOPHAGUS     [address]      x3 surgeries with 14 repairs    KNEE SURGERY Right 1980's    cartilage    AL EXPLORATORY OF ABDOMEN N/A 2/5/2018    ABDOMINAL EXPLORATION WITH LYSIS OF COMPLICATED ADHESIONS WITH AN EXTENDED RIGHT COLON RESECTION performed by Nichol Solis MD at [place] [address] History:  Family History   Problem Relation Age of Onset    Cancer Mother         breast    Heart Disease Father         bladder, lung    Cancer Father     Stroke Brother     Heart Attack Brother     Prostate Cancer Brother     Diabetes Paternal Grandmother     Arthritis Brother     High Blood Pressure Neg Hx        Social History:  Social History     Socioeconomic History    Marital status:      Spouse name: Lg Woodard Number of children: 2    Years of education: Not on file    Highest education level: Not on file   Occupational History    Not on file   Tobacco Use    Smoking status: Former Smoker     Types: Pipe    Smokeless tobacco: Current User     Types: Chew    Tobacco comment: quit pipe over 30 yrs ago   Vaping Use    Vaping Use: Never used   Substance and Sexual Activity    Alcohol use: No     Alcohol/week: 0.0 standard drinks     Comment: quit    Drug use: No    Sexual activity: Yes   Other Topics Concern    Not on file   Social History Narrative    Not on file     Social Determinants of Health     Financial Resource Strain:     Difficulty of Paying Living Expenses: Not on file   Food Insecurity:     Worried About [address] in the Last Year: Not on file    Manjit of Food in the Last Year: Not on file Transportation Needs:     Lack of Transportation (Medical): Not on file    Lack of Transportation (Non-Medical): Not on file   Physical Activity:     Days of Exercise per Week: Not on file    Minutes of Exercise per Session: Not on file   Stress:     Feeling of Stress : Not on file   Social Connections:     Frequency of Communication with Friends and Family: Not on file    Frequency of Social Gatherings with Friends and Family: Not on file    Attends Synagogue Services: Not on file    Active Member of [address] [place] [address] or Organizations: Not on file    Attends Club or Organization Meetings: Not on file    Marital Status: Not on file   Intimate Partner Violence:     Fear of Current or Ex-Partner: Not on file    Emotionally Abused: Not on file    Physically Abused: Not on file    Sexually Abused: Not on file   Housing Stability:     Unable to Pay for Housing in the Last Year: Not on file    Number of [place] in the Last Year: Not on file    Unstable Housing in the Last Year: Not on file       Home Meds:  Prior to Admission medications    Medication Sig Start Date End Date Taking? Authorizing Provider   oxyCODONE-acetaminophen (PERCOCET)  MG per tablet Take 1 tablet by mouth every 8 hours as needed for Pain for up to 30 days. Intended supply: 30 days 3/12/22 4/11/22  SAHIL Biggs CNP   gabapentin (NEURONTIN) 300 MG capsule Take 1 capsule by mouth nightly for 30 days.  3/8/22 4/7/22  SAHIL Biggs CNP   amitriptyline (ELAVIL) 10 MG tablet Take 20 mg by mouth nightly  1/10/22   Historical Provider, MD   spironolactone (ALDACTONE) 50 MG tablet Take 0.5 tablets by mouth daily  Patient taking differently: Take 50 mg by mouth 2 times daily  2/25/22   SAHIL Sam CNP   Fluticasone furoate-vilanterol (BREO ELLIPTA) 200-25 MCG/INH AEPB inhaler Inhale 1 puff into the lungs daily 2/15/22   SAHIL Conway CNP   montelukast (SINGULAIR) 10 MG tablet Take 1 tablet by mouth nightly 2/15/22   Kathy SeatSAHIL swan CNP   albuterol sulfate  (90 Base) MCG/ACT inhaler Inhale 2 puffs into the lungs every 6 hours as needed for Wheezing or Shortness of Breath 2/15/22   Kathy SeatsSAHIL CNP   CPAP Machine MISC by Does not apply route Please change bipap 17/13 cm H20. 12/28/21   Mera Sanches PA-C   rOPINIRole (REQUIP) 1 MG tablet Take 1 pill in afternoon and 2 at bedtime 12/28/21   Mera Sanches PA-C   doxepin [facility]) 25 MG capsule Take 2 capsules by mouth nightly 12/28/21   Mera Sanches PA-C   ibuprofen (ADVIL;MOTRIN) 200 MG tablet Take 400 mg by mouth every 6 hours as needed for Pain    Historical Provider, MD   aspirin EC 81 MG EC tablet Take 1 tablet by mouth daily 9/10/21   SAHIL Garnica CNP   sacubitril-valsartan (ENTRESTO) 49-51 MG per tablet Take 1 tablet by mouth 2 times daily 9/9/21   SAHIL Garnica CNP   bumetanide (BUMEX) 1 MG tablet Take 1 tablet by mouth daily AND 1 tablet as needed.  9/8/21   SAHIL Garnica CNP   hydrALAZINE (APRESOLINE) 100 MG tablet Take 1 tablet by mouth every 8 hours  Patient taking differently: Take 100 mg by mouth 2 times daily  6/14/21   SAHIL Garnica CNP   nitroGLYCERIN (NITROSTAT) 0.4 MG SL tablet Place 1 tablet under the tongue every 5 minutes as needed for Chest pain (up to 3 doses) 6/7/21   SAHIL Ganrica CNP   oxybutynin (DITROPAN XL) 10 MG extended release tablet Take 1 tablet by mouth daily 4/13/21   Shahzad Bryant MD   tamsulosin [facility]) 0.4 MG capsule Take 1 capsule by mouth nightly 4/13/21 4/13/22  Shahzad Bryant MD   levothyroxine (SYNTHROID) 200 MCG tablet Take 1 tablet by mouth Daily 3/3/21   Luis Loya MD   bismuth subsalicylate (PEPTO BISMOL) 262 MG/15ML suspension Take 15 mLs by mouth as needed for Diarrhea     Historical Provider, MD   glimepiride (AMARYL) 4 MG tablet Take 8 mg by mouth daily  1/16/21   Historical Provider, MD   carvedilol (COREG) 25 MG tablet Take 1 tablet by mouth 2 times daily 7/24/20   Yuniel Roberson MD   cyclobenzaprine (FLEXERIL) 10 MG tablet Take 10 mg by mouth 3 times daily as needed for Muscle spasms    Historical Provider, MD   azelastine (ASTELIN) 137 MCG/SPRAY nasal spray 1 spray by Nasal route as needed. Use in each nostril as directed    Historical Provider, MD       Review of Systems:  Constitutional: Positive for chronic shortness of breath, chronic diarrhea  Head: Negative for headaches  Eyes: Negative for blurry vision or discharge  Ears: Negative for ear pain or hearing changes  Nose: Negative for runny nose or epistaxis  Respiratory: Positive for shortness of breath. Negative for cough or sputum production. Negative for hemoptysis.   Cardiovascular: Negative for chest pain  GI: Positive for abdominal wound drainage  : Negative for discharge, dysuria, or hematuria  Musculoskeletal: Negative for joint pain, moves all ext  Neuro: Negative for numbness or tingling, negative for slurred speech  Psychiatric: Reports stable mood, negative for depression or insomnia    All other review of systems were reviewed and negative    Current Meds:  Infusion:    sodium chloride 40 mL/hr at 03/17/22 1231    sodium chloride      dextrose       Meds:    lidocaine  1 patch TransDERmal Daily    insulin lispro  0-6 Units SubCUTAneous TID WC    insulin lispro  0-3 Units SubCUTAneous Nightly    cefepime  2,000 mg IntraVENous Q12H    sodium chloride flush  5-40 mL IntraVENous 2 times per day    enoxaparin  40 mg SubCUTAneous BID    linezolid  600 mg IntraVENous Q12H    [Held by provider] amitriptyline  10 mg Oral Nightly    aspirin EC  81 mg Oral Daily    [Held by provider] doxepin  50 mg Oral Nightly    budesonide-formoterol  2 puff Inhalation BID    montelukast  10 mg Oral Nightly    levothyroxine  175 mcg Oral Daily    rOPINIRole  1 mg Oral Q24H    oxybutynin  10 mg Oral Daily    rOPINIRole  2 mg Oral Nightly    carvedilol  25 mg Oral BID    hydrALAZINE  100 mg Oral 3 times per day     Meds prn: sodium chloride flush, sodium chloride, ondansetron **OR** ondansetron, polyethylene glycol, acetaminophen **OR** acetaminophen, potassium chloride **OR** potassium alternative oral replacement **OR** potassium chloride, albuterol sulfate HFA, oxyCODONE-acetaminophen, [Held by provider] cyclobenzaprine, glucagon (rDNA), dextrose, glucose, dextrose bolus (hypoglycemia)     Allergies/Intolerances: ALLERGIES: Shellfish-derived products, Pcn [penicillins], Succinylcholine, Sulfa antibiotics, Morphine, and Tape [adhesive tape]    24HR INTAKE/OUTPUT:      Intake/Output Summary (Last 24 hours) at 3/18/2022 0906  Last data filed at 3/18/2022 0628  Gross per 24 hour   Intake 990 ml   Output 2600 ml   Net -1610 ml     I/O last 3 completed shifts: In: 1970 [P.O.:1970]  Out: 2697 [Urine:3850]  No intake/output data recorded. Admission weight: (!) 328 lb (148.8 kg)  Wt Readings from Last 3 Encounters:   03/18/22 (!) 327 lb (148.3 kg)   03/03/22 (!) 333 lb 6.4 oz (151.2 kg)   02/15/22 (!) 338 lb 9.6 oz (153.6 kg)     Body mass index is 43.14 kg/m². Physical Examination:  VITALS:   Vitals:    03/18/22 0309 03/18/22 0509 03/18/22 0510 03/18/22 0818   BP: (!) 144/66 127/60  138/63   Pulse: 82 85  84   Resp: 17 19 18   Temp: 98.2 °F (36.8 °C) 98.5 °F (36.9 °C)  98.9 °F (37.2 °C)   TempSrc: Oral Oral  Oral   SpO2: 96% 94%  93%   Weight:   (!) 327 lb (148.3 kg)    Height:         Weight:   Wt Readings from Last 3 Encounters:   03/18/22 (!) 327 lb (148.3 kg)   03/03/22 (!) 333 lb 6.4 oz (151.2 kg)   02/15/22 (!) 338 lb 9.6 oz (153.6 kg)     Constitutional and General Appearance: alert and cooperative with exam, appears comfortable, no distress, not diaphoretic  Eyes: no icteric sclera in left eye or right eye,  no pallor conjunctiva in left or right eye, no discharge seen from left eye or right eye  Ears and Nose: normal external appearance of left and right ear.   Oral: moist oral mucus membranes  Neck: No jugular venous distention  Lungs: Air entry B/L, no crackles or rales, no use of accessory muscles or labored breathing  Heart: regular rate, S1, S2  Extremities: Trace LE edema, no tenderness  GI: soft, non-tender, no guarding, no distention, + abd wound, +obese  Skin: no rash seen on exposed extremities, warm to touch  Musculo: moves all extremities, no clubbing or cyanosis of digits of either upper extremity. Neuro: no slurred speech, no facial drooping  Psychiatric: Normal mood and affect, Not agitated    Lab Data  CBC:   Recent Labs     03/16/22  1121 03/17/22  0236   WBC 6.0 5.6   HGB 8.4* 7.9*   HCT 27.3* 26.1*    273     BMP:  Recent Labs     03/16/22  1121 03/16/22  1943 03/17/22  0236 03/17/22  0236 03/17/22  0819 03/17/22  1449 03/18/22  0431     --  137  --   --   --  137   K 2.8*   < > 3.0*   < > 3.3* 3.4* 3.6     --  100  --   --   --  100   CO2 20*  --  23  --   --   --  21*   BUN 26*  --  28*  --   --   --  28*   CREATININE 2.3*  --  2.3*  --   --   --  2.2*   GLUCOSE 160*  --  65*  --   --   --  84   CALCIUM 8.4*  --  8.5  --   --   --  8.5   MG 1.7  --  1.7  --   --   --   --     < > = values in this interval not displayed. Hepatic: No results for input(s): LABALBU, AST, ALT, ALB, BILITOT, ALKPHOS in the last 72 hours. Additional Labs: UPCR 2.5 grams/g  Diagnostics: US kidney orders    Old tests reviewed. Echo: May 2021: EF 55-60%    Impression and Plan:  1. CAROLE on CKD 3A. Serum creatinine baseline is around 1.3 but now staying around 2.2-2.3. Creatinine was 2.1 in February. Uncertain if this is now his new baseline. He certainly has multiple underlying risk factors including morbid obesity, chronic diastolic failure/cardiorenal, chronic volume depletion from diarrhea. Currently on gentle IV fluid hydration. Urine sodium less than 20 which does suggest some component of prerenal injury. Tolerating IV fluids.   We will keep him on IV fluids another 24 hours. Obtain kidney ultrasound to ensure no underlying hydronephrosis  2. Proteinuria: Secondary to diabetes +/-likely diabetic nephropathy or post adaptive secondary FSGS from morbid obesity  3. Essential hypertension. Continue current medications. Blood pressure readings noted  4. Borderline hypokalemia. Replace as needed. ?  Due to diuretics and diarrhea  5. Abdominal wound  6. Chronic diastolic dysfunction  7. Chronically uncontrolled diabetes mellitus  8. Obesity  9. Chronic back pain    Thank you for the consult. Please feel free to call me if you have any questions.      Miriam Jones MD  [facility]

## 2022-03-18 NOTE — PROGRESS NOTES
General Surgery  Dr Denise Muller  Daily Progress Note      Patient:  Angelica Billingsley      Unit/Bed:4K-16/016-A    YOB: 1960    MRN: [id number]     Acct: [de-identified]     Admit date: 3/16/2022    Subjective: patient sitting in chair, 4x4 dressing changed, purulent drainage noted. Cultures reviewed. Tolerating diet, denies N&V. Feeling better     All other ROS negative except noted in HPI      Patient Seen, Chart, Consults notes, Labs, Radiology studies reviewed. Past, Family, Social History unchanged from admission. Diet:  ADULT DIET; Regular; 4 carb choices (60 gm/meal);  Low Sodium (2 gm)    Medications:  Scheduled Meds:   lidocaine  1 patch TransDERmal Daily    insulin lispro  0-6 Units SubCUTAneous TID WC    insulin lispro  0-3 Units SubCUTAneous Nightly    cefepime  2,000 mg IntraVENous Q12H    sodium chloride flush  5-40 mL IntraVENous 2 times per day    enoxaparin  40 mg SubCUTAneous BID    linezolid  600 mg IntraVENous Q12H    [Held by provider] amitriptyline  10 mg Oral Nightly    aspirin EC  81 mg Oral Daily    [Held by provider] doxepin  50 mg Oral Nightly    budesonide-formoterol  2 puff Inhalation BID    montelukast  10 mg Oral Nightly    levothyroxine  175 mcg Oral Daily    rOPINIRole  1 mg Oral Q24H    oxybutynin  10 mg Oral Daily    rOPINIRole  2 mg Oral Nightly    carvedilol  25 mg Oral BID    hydrALAZINE  100 mg Oral 3 times per day     Continuous Infusions:   [Held by provider] sodium chloride 40 mL/hr at 03/17/22 1231    sodium chloride      dextrose       PRN Meds:sodium chloride flush, sodium chloride, ondansetron **OR** ondansetron, polyethylene glycol, acetaminophen **OR** acetaminophen, potassium chloride **OR** potassium alternative oral replacement **OR** potassium chloride, albuterol sulfate HFA, oxyCODONE-acetaminophen, [Held by provider] cyclobenzaprine, glucagon (rDNA), dextrose, glucose, dextrose bolus (hypoglycemia)    Objective:    CBC: Recent Labs     03/16/22  1121 03/17/22  0236   WBC 6.0 5.6   HGB 8.4* 7.9*    273     BMP:    Recent Labs     03/16/22  1121 03/16/22  1943 03/17/22  0236 03/17/22  0236 03/17/22  0819 03/17/22  1449 03/18/22  0431     --  137  --   --   --  137   K 2.8*   < > 3.0*   < > 3.3* 3.4* 3.6     --  100  --   --   --  100   CO2 20*  --  23  --   --   --  21*   BUN 26*  --  28*  --   --   --  28*   CREATININE 2.3*  --  2.3*  --   --   --  2.2*   GLUCOSE 160*  --  65*  --   --   --  84    < > = values in this interval not displayed. Calcium:  Recent Labs     03/18/22  0431   CALCIUM 8.5     Ionized Calcium:No results for input(s): IONCA in the last 72 hours. Magnesium:  Recent Labs     03/17/22  0236   MG 1.7     Phosphorus:  Recent Labs     03/17/22  0236   PHOS 3.6     BNP:No results for input(s): BNP in the last 72 hours. Glucose:  Recent Labs     03/18/22  0603 03/18/22  0806 03/18/22  1141   POCGLU 88 87 119*     HgbA1C: No results for input(s): LABA1C in the last 72 hours. INR: No results for input(s): INR in the last 72 hours. Hepatic:   Recent Labs     03/18/22  0431   ALKPHOS 104   ALT 19   AST 15   PROT 7.3   BILITOT 0.4   BILIDIR <0.2   LABALBU 2.9*     Amylase and Lipase:No results for input(s): LACTA, AMYLASE in the last 72 hours. Lactic Acid: No results for input(s): LACTA in the last 72 hours. Troponin:   Recent Labs     03/16/22  1121   TROPONINT 0.025*     BNP: No results for input(s): BNP in the last 72 hours. Lipids: No results for input(s): CHOL, TRIG, HDL, LDL, LDLCALC in the last 72 hours. ABGs:   Lab Results   Component Value Date    PH 7.42 03/02/2021    PCO2 41 03/02/2021    PO2 69 03/02/2021    HCO3 27 03/02/2021    O2SAT 94 03/02/2021       Radiology reports as per the Radiologist  Radiology: CT CHEST WO CONTRAST    Result Date: 3/16/2022  PROCEDURE: CT CHEST WO CONTRAST CLINICAL INFORMATION: cough. COMPARISON: Chest x-ray obtained on the same day.  CT scan dated 24th of May 2021. . TECHNIQUE: 5 mm noncontrast axial images were obtained through the chest. Sagittal and coronal reconstructions were obtained. All CT scans at this facility use dose modulation, iterative reconstruction, and/or weight-based dosing when appropriate to reduce radiation dose to as low as reasonably achievable. FINDINGS: There is borderline cardiomegaly. There is calcification in the left anterior descending and right coronary arteries. . The aorta is of normal caliber. There is a 13 mm metallic density in the left lower lobe pulmonary artery. This appears new since previous CT pulmonary angiogram dated 24th of May 2021. Darshan Skates There is small mediastinal lymph nodes present. There is no pericardial effusion. There is a small right pleural effusion There is thickening of the interstitial lung markings. There appears to be slightly increased fluid along the fissures. There is thoracic spondylosis. . Is a small hiatal hernia There is splenomegaly. 1. Borderline cardiomegaly. Calcification in the left anterior descending and right coronary arteries. 2. 13 mm metallic density in the left lower lobe pulmonary artery, new since previous CT scan of the chest dated 24th of May 2021. 3. Small right pleural effusion. 4. Small mediastinal lymph nodes. 5. Thickening off the interstitial lung markings. 6. Thoracic spondylosis. 7. Splenomegaly. 8. Small hiatal hernia. **This report has been created using voice recognition software. It may contain minor errors which are inherent in voice recognition technology. ** Final report electronically signed by  [facility] on 3/16/2022 12:47 PM    NM LUNG VENT/PERFUSION (VQ)    Result Date: 3/16/2022  NM Lung Perfusion/ventilation Comparison: None Findings 3.10 mCi technetium 99m MAA used. 6.80 MCI xenon-133 gas used. Wash-in equilibrium and washout images show normal xenon-133 ventilation scan. The lungs are almost completely clear of xenon within 2 minutes.  Perfusion lung images show no defects. Low probability of pulmonary embolus. Normal ventilation bilaterally with good washout and no areas of trapping. This document has been electronically signed by: Taz Ramirez MD on 03/16/2022 05:52 PM    XR CHEST PORTABLE    Result Date: 3/16/2022  PROCEDURE: XR CHEST PORTABLE CLINICAL INFORMATION: fever. COMPARISON: Chest x-ray dated third of March 2022. TECHNIQUE: AP upright view of the chest. FINDINGS: There is mild cardiomegaly. .The mediastinum is not widened. There are no pulmonary infiltrates or effusions. The pulmonary vascularity is increased. There is thoracic spondylosis. 1. Mild cardiomegaly and increased pulmonary vascularity. 2. Otherwise negative chest x-ray. . **This report has been created using voice recognition software. It may contain minor errors which are inherent in voice recognition technology. ** Final report electronically signed by DR Rodo Miguel on 3/16/2022 11:53 AM       Physical Exam:  Vitals: /62   Pulse 80   Temp 98.9 °F (37.2 °C) (Oral)   Resp 18   Ht 6' 1\" (1.854 m)   Wt (!) 327 lb (148.3 kg)   SpO2 93%   BMI 43.14 kg/m²   24 hour intake/output:    Intake/Output Summary (Last 24 hours) at 3/18/2022 1212  Last data filed at 3/18/2022 1009  Gross per 24 hour   Intake 990 ml   Output 2800 ml   Net -1810 ml     Last 3 weights: Wt Readings from Last 3 Encounters:   03/18/22 (!) 327 lb (148.3 kg)   03/03/22 (!) 333 lb 6.4 oz (151.2 kg)   02/15/22 (!) 338 lb 9.6 oz (153.6 kg)       General appearance - oriented to person, place, and time and overweight  HEENT: Normocephalic and Atraumatic  Chest - clear to auscultation, no wheezes, rales or rhonchi, symmetric air entry  Cardiovascular - normal rate and regular rhythm  Abdomen - BS active.  Soft, round  Wound: infected mesh that is exposed, purulent drainage    Neurological - Alert and oriented and Normal speech  Integumentary - Skin color, texture, turgor normal. No Rashes or lesions  Musculoskeletal -Full ROM times 4 extremities      DVT prophylaxis: [x] Lovenox                                 [] SCDs                                 [] SQ Heparin                                 [] Encourage ambulation           [] Already on Anticoagulation                 Assessment:  1. Infected mesh - sepsis   2. Cultures prelim staph coag positive   3. CAROLE on CKD  4. CHF- HTN   5. COPD  6. DM  7. LAURIE  8. Chronic back pain - percocet   9. 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 *      Plan:  1. Conservative treatment  2. Carb control low sodium diet   3. Analgesia and antiemetics as needed  4. Antibiotic therapy zyvox, cefepime   5. Monitor Labs, lytes per protocol  6. medicine management   7. Nephrology and cardiology following   8. Surgical intervention originally planned for March 25 to remove infected mesh. We will re-evaluate on Monday       Electronically signed by SAHIL Stuart - CNP on 3/18/2022 at 12:12 PM Patient seen and examined independently by me. Above discussed and I agree with CNP. Labs, cultures, and radiographs where available were reviewed. See orders for the updated patient care plan.     Blaze Casas MD MD,   3/20/2022   2:13 PM

## 2022-03-18 NOTE — PROGRESS NOTES
Hospitalist Progress Note    Patient: Cornell Holguin  Unit/Bed: 4K-16/016-A  YOB: 1960  MRN: [id number]  Acct: [de-identified]    PCP: Hood Ramirez DO    Date of Admission: 3/16/2022    Assessment/Plan:    Sepsis secondary to abdominal wound - present on arrival. SIRS 2/4 (Tachycardia and tachypnea). Abdominal wound is from dehiscence of hernia repair in 2018 with surgical mesh exposure, now with purulent discharge. Patient received 1 dose Ceftriaxone within the ED. 30cc/kg fluid bolus completed  - Blood Cx and wound Cx NGTD  - Continue Linezolid and Cefepime; follow Cx for de-escalation  - Surgery consulted and will re-evaluate on 3/21/22 for surgical intervention. Acute hypoxic respiratory failure - Secondary to sepsis and metabolic acidosis  - Wean supplemental oxygen as tolerated to maintain SpO2>92%. - Plan as above     Stage 1 CAROLE on CKD 3a with renal progression to 3b - No significant change. Renal progression appears to have occurred around 2/2022 and likely medication related with adjustments to home Bumex regimen. Patient received 80mg Lasix within ED on arrival  - Avoid nephrotoxic agents and renally dose medications  - Daily standing weights, strict I/O  - Daily BMP     Elevated microalbumin/Creatinine ratio - 1060mg/g. Likely secondary to hypertension and diabetes. Patient follows with Dr. Mindy Baker.  - Nephrology following    Elevated troponin - Secondary to type II demand ischemia. Patient denies chest pain, EKG without ST or T-wave abnormalities. - No indication to trend     Chronic systolic heart failure NYHA II - Without exacerbation. EF=55-60% on ECHO 5/25/21. Implanted CardioMEMS 6/16/2021. [address] 12/1/2020 demonstrating elevated LVEDP.  Patient follows with CHF clinic.  - Patient not currently taking Entresto, spironolactone, carvedilol or Bumex per chart review and recommendations of outside provider per patient  - Will need CardioMEMS download to assess fluid status; will attempt to have this completed inpatient.     COPD GOLD 2 - Without exacerbation. PFT 3/15/2021 FEV1= 75% of predicted. - Continue home Montelukast, Symbicort, Albuterol     NIDDM2 - Uncontrolled. A1c=9.9 (5/24/21)  - high dose ssi, POCT glucose 4x daily AC/HS, Hypoglycemia protocol  - Holding gabapentin for CAROLE  - Resume Elavil for neuropathic pain  - Inpatient BG goal 140-180     Primary HTN - Currently normotensive  - Patient is not taking home hydralazine per recommendations of outside provider  - Continue to monitor for hypertension and initiate anti-hypertensives     LAURIE - CPAP at home  - Resume home CPAP; if not available patient is no 17/13 cmH2O with 1 liter bleed in at night.     BPH - Noted  - Holding Tamsulosin for CAROLE     Hx of hypothyroidism - Noted  - Resume Synthroid     Hx of chronic back pain - Noted  - Resume home percocet  - Hold Flexeril for category X interaction with Linezolid    High anion gap metabolic acidosis - Resolved. Secondary to sepsis and elevated lactic acid. ?Pulmonary embolism - Resolved. Pulmonary embolism was considered within the ED, but CTA not obtainable due to worsening renal function. V/Q scan demonstrated low probability of pulmonary embolism. Moderate Hypokalemia - Resolved. Secondary to diuretic use. Patient also received 80mg Lasix within the ED. Magnesium and phosphorus within normal limits  - Continue potassium replacement as needed  - Continuous telemetry      Expected discharge date:  TBD    Disposition:    [x] Home       [] TCU       [] Rehab       [] Psych       [] SNF       [] [facility]       [] Other -    Chief Complaint: Fever    Hospital Course:  Jorge Alberto Sharma is a 63 y.o. male with PMHx of HTN, CHF, BPH, LAURIE, hypothyroidism, chronic back pain, CKD, and NIDDM2 who presented to [address] [place] [address] with complaint of fever that began approximately 5 days prior to presentation that was self-reported as high as 101F.  The patient had associated symptoms of diaphoresis. He noted that he also has increased cough and shortness of breath from his baseline during these complaints. He has been taking Ibuprofen for his fever and OTC cough medicine for his cough which he states helps. The patient reported that his shortness of breath as well as his cough have been occurring for the last 4 months and have not worsened over that time period. He also stated that he is recently been instructed to hold his Bumex as well as his Entresto from his nephrologist and CHF clinic respectively. He denies headache, lightheadedness, change in vision, rhinorrhea, congestion, sore throat, cough, hemoptysis, chest pain, palpitations, abdominal pain, nausea, vomiting, hematemesis, change in bowel/bladder habits, hematochezia, hematuria, weakness, difficulty with ambulation, numbness/tingling, or known exposure to sick contacts. He admits to fever, chills, AKINS, PND, and shortness of breath. Within the emergency department the patient was administered Lasix 80mg, ASA 324mg, 1 dose of Ceftriaxone 1g, and 4mg morphine, He also underwent CT of the chest which demonstrated borderline cardiomegaly, calcification of the LAD and RCA coronary arteries, small right pleural effusion and splenomegaly. The patient was admitted to the hospital service for further care and management. Orders were placed for general surgery consult. Subjective (past 24 hours): Per nursing, no acute events overnight. The patient stated that he was able to have his wife bring his CPAP from home and that helped him with sleep last night. He still has complaint of right flank/rib pain during deep inspiration. Lidocaine patches ordered to trial. He has no other concerns or complaints. Review of Systems: 12 point review of systems completed and pertinent positives are noted in the HPI. All other systems reviewed and negative.     Medications:  Reviewed    Infusion Medications    sodium chloride 40 mL/hr at 03/17/22 1231    sodium chloride      dextrose       Scheduled Medications    cefepime  2,000 mg IntraVENous Q12H    sodium chloride flush  5-40 mL IntraVENous 2 times per day    enoxaparin  40 mg SubCUTAneous BID    linezolid  600 mg IntraVENous Q12H    [Held by provider] amitriptyline  10 mg Oral Nightly    aspirin EC  81 mg Oral Daily    [Held by provider] doxepin  50 mg Oral Nightly    budesonide-formoterol  2 puff Inhalation BID    montelukast  10 mg Oral Nightly    levothyroxine  175 mcg Oral Daily    rOPINIRole  1 mg Oral Q24H    oxybutynin  10 mg Oral Daily    insulin lispro  0-18 Units SubCUTAneous TID WC    insulin lispro  0-9 Units SubCUTAneous Nightly    rOPINIRole  2 mg Oral Nightly    carvedilol  25 mg Oral BID    hydrALAZINE  100 mg Oral 3 times per day     PRN Meds: sodium chloride flush, sodium chloride, ondansetron **OR** ondansetron, polyethylene glycol, acetaminophen **OR** acetaminophen, potassium chloride **OR** potassium alternative oral replacement **OR** potassium chloride, albuterol sulfate HFA, oxyCODONE-acetaminophen, [Held by provider] cyclobenzaprine, glucagon (rDNA), dextrose, glucose, dextrose bolus (hypoglycemia)      Intake/Output Summary (Last 24 hours) at 3/18/2022 0746  Last data filed at 3/18/2022 3274  Gross per 24 hour   Intake 1230 ml   Output 2600 ml   Net -1370 ml       Diet:  ADULT DIET; Regular; 4 carb choices (60 gm/meal); Low Sodium (2 gm)    Exam:  /60   Pulse 85   Temp 98.5 °F (36.9 °C) (Oral)   Resp 19   Ht 6' 1\" (1.854 m)   Wt (!) 327 lb (148.3 kg)   SpO2 94%   BMI 43.14 kg/m²     Physical Exam  Vitals and nursing note reviewed. Constitutional:       General: He is awake. Appearance: Normal appearance. He is morbidly obese. Interventions: Nasal cannula in place. HENT:      Head: Normocephalic and atraumatic.       Right Ear: External ear normal.      Left Ear: External ear normal.      Nose: Nose normal. Mouth/Throat:      Mouth: Mucous membranes are moist.      Pharynx: Oropharynx is clear. Eyes:      Conjunctiva/sclera: Conjunctivae normal.      Pupils: Pupils are equal, round, and reactive to light. Cardiovascular:      Rate and Rhythm: Normal rate and regular rhythm. Pulses: Normal pulses. Dorsalis pedis pulses are 2+ on the right side and 2+ on the left side. Posterior tibial pulses are 2+ on the right side and 2+ on the left side. Heart sounds: Normal heart sounds. Pulmonary:      Effort: Pulmonary effort is normal.      Breath sounds: Normal breath sounds. Decreased air movement present. No wheezing. Abdominal:      General: A surgical scar is present. Bowel sounds are normal.      Palpations: Abdomen is soft. Tenderness: There is no abdominal tenderness. Musculoskeletal:         General: Normal range of motion. Cervical back: Normal range of motion and neck supple. Right lower leg: No edema. Left lower leg: No edema. Skin:     General: Skin is warm and dry. Capillary Refill: Capillary refill takes less than 2 seconds. Neurological:      General: No focal deficit present. Mental Status: He is alert and oriented to person, place, and time. Mental status is at baseline. GCS: GCS eye subscore is 4. GCS verbal subscore is 5. GCS motor subscore is 6. Psychiatric:         Attention and Perception: Attention and perception normal.         Mood and Affect: Mood and affect normal.         Speech: Speech normal.         Behavior: Behavior normal. Behavior is cooperative. Thought Content:  Thought content normal.         Cognition and Memory: Cognition and memory normal.         Judgment: Judgment normal.         Labs:  Recent Labs     03/16/22  1121 03/17/22  0236   WBC 6.0 5.6   HGB 8.4* 7.9*   HCT 27.3* 26.1*    273     Recent Labs     03/16/22  1121 03/16/22  1943 03/17/22  0236 03/17/22  0236 03/17/22  7873 03/17/22  1449 03/18/22  0431     --  137  --   --   --  137   K 2.8*   < > 3.0*   < > 3.3* 3.4* 3.6     --  100  --   --   --  100   CO2 20*  --  23  --   --   --  21*   BUN 26*  --  28*  --   --   --  28*   CREATININE 2.3*  --  2.3*  --   --   --  2.2*   CALCIUM 8.4*  --  8.5  --   --   --  8.5   PHOS  --   --  3.6  --   --   --   --     < > = values in this interval not displayed. No results for input(s): AST, ALT, BILIDIR, BILITOT, ALKPHOS in the last 72 hours. No results for input(s): INR in the last 72 hours. No results for input(s): [place] Jessenia in the last 72 hours. Microbiology:    Urinalysis:   Lab Results   Component Value Date    NITRU NEGATIVE 03/16/2022    WBCUA 15-25 03/16/2022    BACTERIA NONE SEEN 03/16/2022    RBCUA > 100 03/16/2022    BLOODU LARGE 03/16/2022    SPECGRAV 1.011 03/16/2022    GLUCOSEU 500 04/13/2021       Radiology:  NM LUNG VENT/PERFUSION (VQ)   Final Result   Low probability of pulmonary embolus. Normal ventilation bilaterally with good washout and no areas of trapping. This document has been electronically signed by: Hernan Jones MD on    03/16/2022 05:52 PM      CT CHEST WO CONTRAST   Final Result       1. Borderline cardiomegaly. Calcification in the left anterior descending and right coronary arteries. 2. 13 mm metallic density in the left lower lobe pulmonary artery, new since previous CT scan of the chest dated 24th of May 2021. 3. Small right pleural effusion. 4. Small mediastinal lymph nodes. 5. Thickening off the interstitial lung markings. 6. Thoracic spondylosis. 7. Splenomegaly. 8. Small hiatal hernia. **This report has been created using voice recognition software. It may contain minor errors which are inherent in voice recognition technology. **      Final report electronically signed by DR Mitchell Cannon on 3/16/2022 12:47 PM      XR CHEST PORTABLE   Final Result   1.  Mild cardiomegaly and increased pulmonary vascularity. 2. Otherwise negative chest x-ray. .               **This report has been created using voice recognition software. It may contain minor errors which are inherent in voice recognition technology. **      Final report electronically signed by DR Dennise Strauss on 3/16/2022 11:53 AM          DVT prophylaxis:  [x] Lovenox  [] SCDs  [] SQ Heparin  [] Encourage ambulation  [] Already on anticoagulation  [] Other -      Code Status: Full Code    PT/OT Evaluation Status: N/A    Telemetry:  [x] Yes  [] No    Electronically signed by Florinda Grimes DO, MBA on 3/18/2022 at 7:46 AM

## 2022-03-19 ENCOUNTER — APPOINTMENT (OUTPATIENT)
Dept: CT IMAGING | Age: 62
DRG: 907 | End: 2022-03-19
Payer: MEDICARE

## 2022-03-19 LAB
ANION GAP SERPL CALCULATED.3IONS-SCNC: 14 MEQ/L (ref 8–16)
BORDETELLA PARAPERTUSSIS BY PCR: NOT DETECTED
BORDETELLA PERTUSSIS BY PCR: NOT DETECTED
BUN BLDV-MCNC: 31 MG/DL (ref 7–22)
CALCIUM SERPL-MCNC: 8.5 MG/DL (ref 8.5–10.5)
CHLORIDE BLD-SCNC: 100 MEQ/L (ref 98–111)
CO2: 21 MEQ/L (ref 23–33)
CREAT SERPL-MCNC: 2.4 MG/DL (ref 0.4–1.2)
EKG ATRIAL RATE: 82 BPM
EKG P AXIS: 4 DEGREES
EKG P-R INTERVAL: 160 MS
EKG Q-T INTERVAL: 416 MS
EKG QRS DURATION: 108 MS
EKG QTC CALCULATION (BAZETT): 486 MS
EKG R AXIS: -9 DEGREES
EKG T AXIS: 4 DEGREES
EKG VENTRICULAR RATE: 82 BPM
ERYTHROCYTE [DISTWIDTH] IN BLOOD BY AUTOMATED COUNT: 16.9 % (ref 11.5–14.5)
ERYTHROCYTE [DISTWIDTH] IN BLOOD BY AUTOMATED COUNT: 52.6 FL (ref 35–45)
FILM ARRAY ADENOVIRUS: NOT DETECTED
FILM ARRAY CHLAMYDOPHILIA PNEUMONIAE: NOT DETECTED
FILM ARRAY CORONAVIRUS 229E: NOT DETECTED
FILM ARRAY CORONAVIRUS HKU1: NOT DETECTED
FILM ARRAY CORONAVIRUS NL63: NOT DETECTED
FILM ARRAY CORONAVIRUS OC43: NOT DETECTED
FILM ARRAY INFLUENZA A VIRUS: NOT DETECTED
FILM ARRAY INFLUENZA B: NOT DETECTED
FILM ARRAY METAPNEUMOVIRUS: NOT DETECTED
FILM ARRAY MYCOPLASMA PNEUMONIAE: NOT DETECTED
FILM ARRAY PARAINFLUENZA VIRUS 1: NOT DETECTED
FILM ARRAY PARAINFLUENZA VIRUS 2: NOT DETECTED
FILM ARRAY PARAINFLUENZA VIRUS 3: NOT DETECTED
FILM ARRAY PARAINFLUENZA VIRUS 4: NOT DETECTED
FILM ARRAY RESPIRATORY SYNCITIAL VIRUS: NOT DETECTED
FILM ARRAY RHINOVIRUS/ENTEROVIRUS: NOT DETECTED
GFR SERPL CREATININE-BSD FRML MDRD: 28 ML/MIN/1.73M2
GLUCOSE BLD-MCNC: 106 MG/DL (ref 70–108)
GLUCOSE BLD-MCNC: 113 MG/DL (ref 70–108)
GLUCOSE BLD-MCNC: 114 MG/DL (ref 70–108)
GLUCOSE BLD-MCNC: 127 MG/DL (ref 70–108)
GLUCOSE BLD-MCNC: 128 MG/DL (ref 70–108)
GLUCOSE BLD-MCNC: 143 MG/DL (ref 70–108)
HCT VFR BLD CALC: 25.8 % (ref 42–52)
HEMOGLOBIN: 7.8 GM/DL (ref 14–18)
MAGNESIUM: 1.8 MG/DL (ref 1.6–2.4)
MCH RBC QN AUTO: 25.8 PG (ref 26–33)
MCHC RBC AUTO-ENTMCNC: 30.2 GM/DL (ref 32.2–35.5)
MCV RBC AUTO: 85.4 FL (ref 80–94)
PHOSPHORUS: 4.4 MG/DL (ref 2.4–4.7)
PLATELET # BLD: 265 THOU/MM3 (ref 130–400)
PMV BLD AUTO: 8.5 FL (ref 9.4–12.4)
POTASSIUM SERPL-SCNC: 3.6 MEQ/L (ref 3.5–5.2)
RBC # BLD: 3.02 MILL/MM3 (ref 4.7–6.1)
SARS-COV-2, PCR: NOT DETECTED
SODIUM BLD-SCNC: 135 MEQ/L (ref 135–145)
WBC # BLD: 5.4 THOU/MM3 (ref 4.8–10.8)

## 2022-03-19 PROCEDURE — 6360000002 HC RX W HCPCS: Performed by: INTERNAL MEDICINE

## 2022-03-19 PROCEDURE — 82948 REAGENT STRIP/BLOOD GLUCOSE: CPT

## 2022-03-19 PROCEDURE — 80048 BASIC METABOLIC PNL TOTAL CA: CPT

## 2022-03-19 PROCEDURE — 2700000000 HC OXYGEN THERAPY PER DAY

## 2022-03-19 PROCEDURE — 99223 1ST HOSP IP/OBS HIGH 75: CPT | Performed by: INTERNAL MEDICINE

## 2022-03-19 PROCEDURE — 36415 COLL VENOUS BLD VENIPUNCTURE: CPT

## 2022-03-19 PROCEDURE — 94760 N-INVAS EAR/PLS OXIMETRY 1: CPT

## 2022-03-19 PROCEDURE — 6370000000 HC RX 637 (ALT 250 FOR IP): Performed by: STUDENT IN AN ORGANIZED HEALTH CARE EDUCATION/TRAINING PROGRAM

## 2022-03-19 PROCEDURE — 84100 ASSAY OF PHOSPHORUS: CPT

## 2022-03-19 PROCEDURE — 74176 CT ABD & PELVIS W/O CONTRAST: CPT

## 2022-03-19 PROCEDURE — 6370000000 HC RX 637 (ALT 250 FOR IP): Performed by: INTERNAL MEDICINE

## 2022-03-19 PROCEDURE — 99233 SBSQ HOSP IP/OBS HIGH 50: CPT | Performed by: INTERNAL MEDICINE

## 2022-03-19 PROCEDURE — 2060000000 HC ICU INTERMEDIATE R&B

## 2022-03-19 PROCEDURE — 83735 ASSAY OF MAGNESIUM: CPT

## 2022-03-19 PROCEDURE — 94640 AIRWAY INHALATION TREATMENT: CPT

## 2022-03-19 PROCEDURE — 99232 SBSQ HOSP IP/OBS MODERATE 35: CPT | Performed by: INTERNAL MEDICINE

## 2022-03-19 PROCEDURE — 99232 SBSQ HOSP IP/OBS MODERATE 35: CPT | Performed by: SURGERY

## 2022-03-19 PROCEDURE — 6360000002 HC RX W HCPCS: Performed by: STUDENT IN AN ORGANIZED HEALTH CARE EDUCATION/TRAINING PROGRAM

## 2022-03-19 PROCEDURE — 85027 COMPLETE CBC AUTOMATED: CPT

## 2022-03-19 PROCEDURE — 2580000003 HC RX 258: Performed by: STUDENT IN AN ORGANIZED HEALTH CARE EDUCATION/TRAINING PROGRAM

## 2022-03-19 PROCEDURE — 2500000003 HC RX 250 WO HCPCS: Performed by: STUDENT IN AN ORGANIZED HEALTH CARE EDUCATION/TRAINING PROGRAM

## 2022-03-19 PROCEDURE — 93010 ELECTROCARDIOGRAM REPORT: CPT | Performed by: INTERNAL MEDICINE

## 2022-03-19 PROCEDURE — 0202U NFCT DS 22 TRGT SARS-COV-2: CPT

## 2022-03-19 RX ORDER — HYDRALAZINE HYDROCHLORIDE 25 MG/1
25 TABLET, FILM COATED ORAL 3 TIMES DAILY
Status: DISCONTINUED | OUTPATIENT
Start: 2022-03-19 | End: 2022-03-26

## 2022-03-19 RX ORDER — FUROSEMIDE 10 MG/ML
60 INJECTION INTRAMUSCULAR; INTRAVENOUS ONCE
Status: COMPLETED | OUTPATIENT
Start: 2022-03-19 | End: 2022-03-19

## 2022-03-19 RX ORDER — IPRATROPIUM BROMIDE AND ALBUTEROL SULFATE 2.5; .5 MG/3ML; MG/3ML
1 SOLUTION RESPIRATORY (INHALATION) EVERY 4 HOURS PRN
Status: DISCONTINUED | OUTPATIENT
Start: 2022-03-19 | End: 2022-03-30 | Stop reason: HOSPADM

## 2022-03-19 RX ORDER — IPRATROPIUM BROMIDE AND ALBUTEROL SULFATE 2.5; .5 MG/3ML; MG/3ML
1 SOLUTION RESPIRATORY (INHALATION) 2 TIMES DAILY
Status: DISCONTINUED | OUTPATIENT
Start: 2022-03-19 | End: 2022-03-30 | Stop reason: HOSPADM

## 2022-03-19 RX ORDER — BUMETANIDE 0.25 MG/ML
2 INJECTION, SOLUTION INTRAMUSCULAR; INTRAVENOUS ONCE
Status: DISCONTINUED | OUTPATIENT
Start: 2022-03-19 | End: 2022-03-19

## 2022-03-19 RX ADMIN — CARVEDILOL 25 MG: 25 TABLET, FILM COATED ORAL at 16:25

## 2022-03-19 RX ADMIN — HYDRALAZINE HYDROCHLORIDE 100 MG: 50 TABLET, FILM COATED ORAL at 00:30

## 2022-03-19 RX ADMIN — BUDESONIDE AND FORMOTEROL FUMARATE DIHYDRATE 2 PUFF: 160; 4.5 AEROSOL RESPIRATORY (INHALATION) at 07:36

## 2022-03-19 RX ADMIN — LINEZOLID 600 MG: 600 INJECTION, SOLUTION INTRAVENOUS at 00:25

## 2022-03-19 RX ADMIN — LINEZOLID 600 MG: 600 INJECTION, SOLUTION INTRAVENOUS at 13:54

## 2022-03-19 RX ADMIN — ENOXAPARIN SODIUM 40 MG: 100 INJECTION SUBCUTANEOUS at 20:42

## 2022-03-19 RX ADMIN — SODIUM CHLORIDE, PRESERVATIVE FREE 10 ML: 5 INJECTION INTRAVENOUS at 20:43

## 2022-03-19 RX ADMIN — LINEZOLID 600 MG: 600 INJECTION, SOLUTION INTRAVENOUS at 23:42

## 2022-03-19 RX ADMIN — ROPINIROLE HYDROCHLORIDE 2 MG: 1 TABLET, FILM COATED ORAL at 20:42

## 2022-03-19 RX ADMIN — HYDRALAZINE HYDROCHLORIDE 25 MG: 25 TABLET, FILM COATED ORAL at 16:25

## 2022-03-19 RX ADMIN — OXYCODONE AND ACETAMINOPHEN 1 TABLET: 10; 325 TABLET ORAL at 11:27

## 2022-03-19 RX ADMIN — OXYCODONE AND ACETAMINOPHEN 1 TABLET: 10; 325 TABLET ORAL at 01:59

## 2022-03-19 RX ADMIN — CARVEDILOL 25 MG: 25 TABLET, FILM COATED ORAL at 08:39

## 2022-03-19 RX ADMIN — SODIUM CHLORIDE, PRESERVATIVE FREE 10 ML: 5 INJECTION INTRAVENOUS at 08:41

## 2022-03-19 RX ADMIN — CEFEPIME 2000 MG: 2 INJECTION, POWDER, FOR SOLUTION INTRAMUSCULAR; INTRAVENOUS at 16:26

## 2022-03-19 RX ADMIN — BUDESONIDE AND FORMOTEROL FUMARATE DIHYDRATE 2 PUFF: 160; 4.5 AEROSOL RESPIRATORY (INHALATION) at 16:59

## 2022-03-19 RX ADMIN — ASPIRIN 81 MG: 81 TABLET, COATED ORAL at 08:38

## 2022-03-19 RX ADMIN — LEVOTHYROXINE SODIUM 175 MCG: 0.15 TABLET ORAL at 08:39

## 2022-03-19 RX ADMIN — IPRATROPIUM BROMIDE AND ALBUTEROL SULFATE 1 AMPULE: .5; 3 SOLUTION RESPIRATORY (INHALATION) at 07:28

## 2022-03-19 RX ADMIN — OXYBUTYNIN CHLORIDE 10 MG: 10 TABLET, EXTENDED RELEASE ORAL at 08:40

## 2022-03-19 RX ADMIN — HYDRALAZINE HYDROCHLORIDE 25 MG: 25 TABLET, FILM COATED ORAL at 20:42

## 2022-03-19 RX ADMIN — FUROSEMIDE 60 MG: 10 INJECTION, SOLUTION INTRAMUSCULAR; INTRAVENOUS at 14:18

## 2022-03-19 RX ADMIN — CEFEPIME 2000 MG: 2 INJECTION, POWDER, FOR SOLUTION INTRAMUSCULAR; INTRAVENOUS at 08:38

## 2022-03-19 RX ADMIN — MONTELUKAST SODIUM 10 MG: 10 TABLET ORAL at 20:42

## 2022-03-19 RX ADMIN — ENOXAPARIN SODIUM 40 MG: 100 INJECTION SUBCUTANEOUS at 08:40

## 2022-03-19 RX ADMIN — ROPINIROLE HYDROCHLORIDE 1 MG: 1 TABLET, FILM COATED ORAL at 14:00

## 2022-03-19 RX ADMIN — ONDANSETRON 4 MG: 2 INJECTION INTRAMUSCULAR; INTRAVENOUS at 03:31

## 2022-03-19 ASSESSMENT — PAIN SCALES - GENERAL
PAINLEVEL_OUTOF10: 7
PAINLEVEL_OUTOF10: 7
PAINLEVEL_OUTOF10: 8

## 2022-03-19 ASSESSMENT — PAIN DESCRIPTION - PAIN TYPE: TYPE: ACUTE PAIN

## 2022-03-19 NOTE — CONSULTS
[address] [place] [address]                                  CONSULTATION    PATIENT NAME: Dar Pruitt                   :        1960  MED REC NO:   [id number]                           ROOM:       [id number]  ACCOUNT NO:   [de-identified]                           ADMIT DATE: 2022  PROVIDER:     Diomedes Briseno. Rekha Stark M.D.    Guerra Baptise:  03/10/2022    REASON OF CONSULTATION:  Preoperative risk assessment for infected  abdominal mesh surgery and evaluation also for congestive heart failure  with cardiorenal syndrome. PRIMARY CARDIOLOGIST:  oClleen Pineda MD    HISTORY OF PRESENT ILLNESS:  This is a 70-year-old  gentleman  with history of nonischemic dilated cardiomyopathy, congestive heart  failure, chronic kidney disease, previous hernia surgery with mesh in  the abdomen and multiple revision of the abdominal mesh, diabetes,  obesity, thyroid disorder, history of small bowel obstruction, presented  to the emergency room because of fever, shortness of breath and cough,  and the patient reported that he has been having this issue with  abdominal mesh and has been having some wound on the abdomen, open wound  which is draining for a while and has been under followup and wound care  as well. So, he has previous multiple abdominal surgery and mesh  revision for hernia repair and he has history of recurrent small bowel  obstruction. So, the patient came in and has been noted to have acute  febrile illness related to the abdominal mesh infection and so he has  been started on antibiotic and surgeon has seen him for possible  surgical management and so Cardiology evaluation was sought for  preoperative risk assessment. The patient denied having any chest pain and he has no orthopnea,  paroxysmal nocturnal dyspnea, some short of breath on exertion.   He  started to have some leg swelling after admission, otherwise was not  feeling any short of breath or so before. Seen in CHF Clinic 03/03/2022, all diuretics has been held and put on  Bumex as needed in view of the worsening of renal function and the  patient was also well compensated. After admission, he started to feel  little more short of breath and leg swelling. He is getting his  diuretic as needed. Nephrology is on board. He is on IV fluid for  acute kidney injury on chronic kidney disease. He has a history of  pulmonary hypertension as well on the right heart catheterization. REVIEW OF SYSTEMS:  Negative except the above-mentioned ones. PAST MEDICAL HISTORY:  Include pulmonary hypertension, congestive heart  failure and followup with CHF Clinic as well, systemic hypertension,  history of CardioMEMS placement and nonischemic dilated cardiomyopathy,  history of abdominal mesh infection, history of multiple abdominal  surgeries with revision of the mesh, history of small bowel  obstructions. PAST SURGICAL HISTORY:  Abdominal surgery, cardiac catheterization,  CardioMEMS placement, hernia repair. FAMILY HISTORY:  Positive for coronary artery disease. SOCIAL HISTORY:  He is a former smoker. No alcohol. No drug use. ALLERGIES:  HE IS ALLERGIC TO PENICILLIN, SHELLFISH, SUCCINYLCHOLINE,  SULFA, MORPHINE AND TAPE. HOME MEDICATIONS:  Prior to his admission include albuterol,  amitriptyline, aspirin, _____ and Pepto-Bismol, Bumex 1 mg p.o. daily,  Coreg 25 twice a day, Flexeril, doxepin, Neurontin, Amaryl, hydralazine,  Advil, Synthroid, Singulair, Nitrostat, Ditropan, oxycodone, Requip,  Entresto, Aldactone and tamsulosin. Aldactone has been held recently. Bumex has been put on p.r.n. PHYSICAL EXAMINATION:  GENERAL:  Looked sick, in no form of distress. VITAL SIGNS:  Blood pressure 126/72, pulse rate 75, respiratory rate 16,  temperature 98.5. HEENT:  Pale conjunctivae. Anicteric sclerae.   Pupils are equal and  reactive bilateral to light.  NECK:  No lymphadenopathy. No goiter. No JVD. CHEST:  Clear to auscultation and resonant to percussion. CARDIOVASCULAR:  Arteries are felt; carotid, femoral, and pedal.  No  bruits. S1, S2 well heard. No murmur or gallop rhythm. ABDOMEN:  Soft, nontender, nondistended. Bowel sounds are positive. GENITALIA:  No CVA, flank or suprapubic tenderness. LOCOMOTOR:  No cyanosis, clubbing, or muscle or joint tenderness. Bilateral pedal, pretibial edema. SKIN:  No rashes, ulcers, or nodules. CNS:  Alert, awake, and oriented to time, person, and place. Cranial  nerves are intact. Sensation is intact to light touch and pain. Motor:  Normal muscle strength and tone. Appropriate mood and affect. WORKUP:  EKG:  Sinus rhythm, incomplete right bundle-branch block,  nonspecific ST abnormality, otherwise no acute EKG abnormality. No new  EKG abnormality noted. Cardiac enzymes:  Troponin minimal elevation of 0.025, 0.011. Sodium  135, potassium 3.6, BUN 36, potassium 2.4. Hematology:  White blood cell 5.4, hemoglobin 7.8 and platelet 341. Cardiac catheterization 12/2020, patent coronary, elevated right side  pressure with pulmonary hypertension with mean pulmonary artery pressure  of 59 mmHg. He had a CardioMEMS placed. Chest x-ray on admission include bronchovascular markings, otherwise no  definite congestion. Chest x-ray two days after admission,  cardiomegaly. No evidence of CHF. BNP elevated to 1532. He had limited echocardiogram 05/2021, ejection fraction 55%, improved  from a baseline. In 2019, ejection fraction was 40% and in 2020,  ejection fraction was 55%. ASSESSMENT:  1. Acute febrile illness with sepsis secondary to abdominal wall mesh  infection. 2.  Status post hypoxic respiratory failure with metabolic acidosis  secondary to probably sepsis. 2.  Acute kidney injury on chronic kidney disease.   3.  Elevated troponin seems to be demand related ischemia secondary to  acute kidney injury and sepsis. 4.  Congestive heart failure with preserved ejection fraction, at this  time well compensated, stable. Recently has been hold Entresto,  Aldactone and change Bumex as needed in view of the worsening of renal  function and potassium situation. 5.  However, at this time, still in acute kidney injury, Nephrology is  on board. Potassium has been corrected. Still off Aldactone and also  Entresto, but he is continuing his Coreg and hydralazine for blood  pressure control as well. 6.  Pulmonary hypertension per right heart catheterization, obstructive  sleep apnea. 7.  History of COPD. 8.  Preoperative risk assessment for abdominal surgery. 9.  Overall, this is a 45-year-old gentleman with a nonischemic dilated  cardiomyopathy, ejection fraction has improved and patent coronaries per  cath in 2020, admitted for abdominal mesh infection with febrile illness  and hypoxia and acute kidney injury on chronic kidney disease. Now,  Nephrology is on board. Kidney function is better and infection under  control with antibiotics and still surgery has been considered. Cardiology evaluation was for preoperative risk assessment basically. 10.  Assistance in the management of congestive heart failure. PLAN AND RECOMMENDATION:  From preoperative risk assessment point of  view, the patient may proceed for the intended surgery with moderate  risk of having major cardiovascular event. Otherwise, he has a recent  echocardiogram, EF normalized, so patient may proceed for the surgery  with moderate risk. Elevated troponin, demand related ischemia. So, basically no further  workup required at this level. Diuresis as needed per the Nephrology. Nephrology is on board and the  patient is on acute kidney injury, I will leave that to the discretion  of nephrologist.    So, we will get a CardioMEMS reading basically on next Monday and  address accordingly.   Basically at this level, he has some leg swelling  after admission because of the IV fluids and also he is on hydration  because of the acute kidney injury. So, I will leave the diuresis as  needed to the nephrologist and based on the course, we will get further  care. Thank you for allowing me to participate in the care of this patient. I  will followup with you. Chandrakant Larson.  Vidal Soni M.D.    D: 03/19/2022 12:11:43       T: 03/19/2022 13:56:19     ROSENDO/TORI  Job#: [id number]     Doc#: [id number]    CC:

## 2022-03-19 NOTE — CONSULTS
chf  Leg edema +1  abd wall mesh infection  Pre op eval  CAROLE on ckd      Plan  May proceed with mod risk  Diuresis as needed - nephrolgy to address    [id number]    Magaly Albert MD

## 2022-03-19 NOTE — PROGRESS NOTES
[facility]         Renal Inpatient Follow-Up note         3/19/2022 12:51 PM    Pt Name:   Colleen Pineda:   1960  Attending:   Danae Linares DO    Chief Complaint : Annie Avila is a 63 y.o. male being followed by nephrology for Padilla/CKD/fluid overload    Interval History :   Patient seen and examined by me. No distress  Feels ok. No cp or SOB. Some leg swelling noted      Scheduled Medications :    ipratropium-albuterol  1 ampule Inhalation BID    hydrALAZINE  25 mg Oral TID    lidocaine  1 patch TransDERmal Daily    insulin lispro  0-6 Units SubCUTAneous TID WC    insulin lispro  0-3 Units SubCUTAneous Nightly    cefepime  2,000 mg IntraVENous Q12H    sodium chloride flush  5-40 mL IntraVENous 2 times per day    enoxaparin  40 mg SubCUTAneous BID    linezolid  600 mg IntraVENous Q12H    [Held by provider] amitriptyline  10 mg Oral Nightly    aspirin EC  81 mg Oral Daily    [Held by provider] doxepin  50 mg Oral Nightly    budesonide-formoterol  2 puff Inhalation BID    montelukast  10 mg Oral Nightly    levothyroxine  175 mcg Oral Daily    rOPINIRole  1 mg Oral Q24H    oxybutynin  10 mg Oral Daily    rOPINIRole  2 mg Oral Nightly    carvedilol  25 mg Oral BID      [Held by provider] sodium chloride 40 mL/hr at 03/19/22 0613    sodium chloride      dextrose         Vitals :  /72   Pulse 75   Temp 98.5 °F (36.9 °C) (Oral)   Resp 16   Ht 6' 1\" (1.854 m)   Wt (!) 327 lb (148.3 kg)   SpO2 95%   BMI 43.14 kg/m²     24HR INTAKE/OUTPUT:      Intake/Output Summary (Last 24 hours) at 3/19/2022 1251  Last data filed at 3/19/2022 7112  Gross per 24 hour   Intake 4364.21 ml   Output 600 ml   Net 3764.21 ml     Last 3 weights  Wt Readings from Last 3 Encounters:   03/18/22 (!) 327 lb (148.3 kg)   03/03/22 (!) 333 lb 6.4 oz (151.2 kg)   02/15/22 (!) 338 lb 9.6 oz (153.6 kg)           Physical Exam :  General Appearance:  Well developed.  No distress  Mouth/Throat:  Oral mucosa moist  Neck:  Supple, no JVD  Lungs:  Breath sounds: clear  Heart[de-identified]  S1,S2 heard  Abdomen:  Soft, non - tender  Musculoskeletal:  Edema - noted Rt > left         Last 3 CBC   Recent Labs     03/17/22  0236 03/19/22  0432   WBC 5.6 5.4   RBC 3.08* 3.02*   HGB 7.9* 7.8*   HCT 26.1* 25.8*    265     Last 3 CMP  Recent Labs     03/17/22  0236 03/17/22  0819 03/17/22  1449 03/18/22  0431 03/19/22  0432     --   --  137 135   K 3.0*   < > 3.4* 3.6 3.6     --   --  100 100   CO2 23  --   --  21* 21*   BUN 28*  --   --  28* 31*   CREATININE 2.3*  --   --  2.2* 2.4*   CALCIUM 8.5  --   --  8.5 8.5   LABALBU  --   --   --  2.9*  --    BILITOT  --   --   --  0.4  --     < > = values in this interval not displayed. ASSESSMENT / Plan   1 Renal -acute kidney injury, thought to be due to chronic volume depletion from diarrhea however not much improvement with IV hydration, definitely in some positive fluid balance  ? At this time will discontinue IV fluids and monitor renal function  ? Can give diuretics if needed    2 Electrolytes -hypokalemia much better  3 Essential hypertension running well continue current medications  4 Abdominal wound on antibiotics  5 Hx of diabetes mellitus with diabetic nephropathy  6 Morbid obesity  7 Meds reviewed and discussed with patient    Dr. Sarabjit Cárdenas MD, M,D.  [facility].

## 2022-03-19 NOTE — PROGRESS NOTES
General Surgery   Dr. Scot Osborne for Dr Matthew Blanc  Daily Progress Note      Patient:  Ajay Eden    YOB: 1960    MRN: [id number]     Acct: [de-identified]     Admit date: 3/16/2022    Subjective: patient sitting in chair, minimal purulent drainage noted. Visible mesh noted . Tolerating diet, denies N&V. Feeling better     All other ROS negative except noted in HPI      Patient Seen, Chart, Consults notes, Labs, Radiology studies reviewed. Past, Family, Social History unchanged from admission. Diet:  ADULT DIET; Regular; 4 carb choices (60 gm/meal);  Low Sodium (2 gm)    Medications:  Scheduled Meds:   ipratropium-albuterol  1 ampule Inhalation BID    hydrALAZINE  25 mg Oral TID    lidocaine  1 patch TransDERmal Daily    insulin lispro  0-6 Units SubCUTAneous TID WC    insulin lispro  0-3 Units SubCUTAneous Nightly    cefepime  2,000 mg IntraVENous Q12H    sodium chloride flush  5-40 mL IntraVENous 2 times per day    enoxaparin  40 mg SubCUTAneous BID    linezolid  600 mg IntraVENous Q12H    [Held by provider] amitriptyline  10 mg Oral Nightly    aspirin EC  81 mg Oral Daily    [Held by provider] doxepin  50 mg Oral Nightly    budesonide-formoterol  2 puff Inhalation BID    montelukast  10 mg Oral Nightly    levothyroxine  175 mcg Oral Daily    rOPINIRole  1 mg Oral Q24H    oxybutynin  10 mg Oral Daily    rOPINIRole  2 mg Oral Nightly    carvedilol  25 mg Oral BID     Continuous Infusions:   [Held by provider] sodium chloride 40 mL/hr at 03/19/22 0613    sodium chloride      dextrose       PRN Meds:sodium chloride flush, sodium chloride, ondansetron **OR** ondansetron, polyethylene glycol, acetaminophen **OR** acetaminophen, potassium chloride **OR** potassium alternative oral replacement **OR** potassium chloride, albuterol sulfate HFA, oxyCODONE-acetaminophen, [Held by provider] cyclobenzaprine, glucagon (rDNA), dextrose, glucose, dextrose bolus (hypoglycemia)    Objective:    CBC:   Recent Labs     03/16/22  1121 03/17/22  0236 03/19/22  0432   WBC 6.0 5.6 5.4   HGB 8.4* 7.9* 7.8*    273 265     BMP:    Recent Labs     03/17/22  0236 03/17/22  0819 03/17/22  1449 03/18/22  0431 03/19/22  0432     --   --  137 135   K 3.0*   < > 3.4* 3.6 3.6     --   --  100 100   CO2 23  --   --  21* 21*   BUN 28*  --   --  28* 31*   CREATININE 2.3*  --   --  2.2* 2.4*   GLUCOSE 65*  --   --  84 106    < > = values in this interval not displayed. Calcium:  Recent Labs     03/19/22 0432   CALCIUM 8.5     Ionized Calcium:No results for input(s): IONCA in the last 72 hours. Magnesium:  Recent Labs     03/19/22 0432   MG 1.8     Phosphorus:  Recent Labs     03/19/22 0432   PHOS 4.4     BNP:No results for input(s): BNP in the last 72 hours. Glucose:  Recent Labs     03/18/22  1951 03/19/22  0337 03/19/22  0834   POCGLU 176* 127* 143*     HgbA1C: No results for input(s): LABA1C in the last 72 hours. INR: No results for input(s): INR in the last 72 hours. Hepatic:   Recent Labs     03/18/22 0431   ALKPHOS 104   ALT 19   AST 15   PROT 7.3   BILITOT 0.4   BILIDIR <0.2   LABALBU 2.9*     Amylase and Lipase:No results for input(s): LACTA, AMYLASE in the last 72 hours. Lactic Acid: No results for input(s): LACTA in the last 72 hours. Troponin:   Recent Labs     03/16/22  1121 03/18/22 2007   TROPONINT 0.025* 0.011*     BNP: No results for input(s): BNP in the last 72 hours. Lipids: No results for input(s): CHOL, TRIG, HDL, LDL, LDLCALC in the last 72 hours. ABGs:   Lab Results   Component Value Date    PH 7.42 03/02/2021    PCO2 41 03/02/2021    PO2 69 03/02/2021    HCO3 27 03/02/2021    O2SAT 94 03/02/2021       Radiology reports as per the Radiologist  Radiology: CT CHEST WO CONTRAST    Result Date: 3/16/2022  PROCEDURE: CT CHEST WO CONTRAST CLINICAL INFORMATION: cough. COMPARISON: Chest x-ray obtained on the same day.  CT scan dated 24th of May 2021. [facility] TECHNIQUE: 5 mm noncontrast axial images were obtained through the chest. Sagittal and coronal reconstructions were obtained. All CT scans at this facility use dose modulation, iterative reconstruction, and/or weight-based dosing when appropriate to reduce radiation dose to as low as reasonably achievable. FINDINGS: There is borderline cardiomegaly. There is calcification in the left anterior descending and right coronary arteries. . The aorta is of normal caliber. There is a 13 mm metallic density in the left lower lobe pulmonary artery. This appears new since previous CT pulmonary angiogram dated 24th of May 2021. [facility] There is small mediastinal lymph nodes present. There is no pericardial effusion. There is a small right pleural effusion There is thickening of the interstitial lung markings. There appears to be slightly increased fluid along the fissures. There is thoracic spondylosis. . Is a small hiatal hernia There is splenomegaly. 1. Borderline cardiomegaly. Calcification in the left anterior descending and right coronary arteries. 2. 13 mm metallic density in the left lower lobe pulmonary artery, new since previous CT scan of the chest dated 24th of May 2021. 3. Small right pleural effusion. 4. Small mediastinal lymph nodes. 5. Thickening off the interstitial lung markings. 6. Thoracic spondylosis. 7. Splenomegaly. 8. Small hiatal hernia. **This report has been created using voice recognition software. It may contain minor errors which are inherent in voice recognition technology. ** Final report electronically signed by DR Albert Bill on 3/16/2022 12:47 PM    NM LUNG VENT/PERFUSION (VQ)    Result Date: 3/16/2022  NM Lung Perfusion/ventilation Comparison: None Findings 3.10 mCi technetium 99m MAA used. 6.80 MCI xenon-133 gas used. Wash-in equilibrium and washout images show normal xenon-133 ventilation scan. The lungs are almost completely clear of xenon within 2 minutes.  Perfusion lung images show no defects. Low probability of pulmonary embolus. Normal ventilation bilaterally with good washout and no areas of trapping. This document has been electronically signed by: Brian Alarcon MD on 03/16/2022 05:52 PM    XR CHEST PORTABLE    Result Date: 3/16/2022  PROCEDURE: XR CHEST PORTABLE CLINICAL INFORMATION: fever. COMPARISON: Chest x-ray dated third of March 2022. TECHNIQUE: AP upright view of the chest. FINDINGS: There is mild cardiomegaly. .The mediastinum is not widened. There are no pulmonary infiltrates or effusions. The pulmonary vascularity is increased. There is thoracic spondylosis. 1. Mild cardiomegaly and increased pulmonary vascularity. 2. Otherwise negative chest x-ray. . **This report has been created using voice recognition software. It may contain minor errors which are inherent in voice recognition technology. ** Final report electronically signed by DR Jeovany Hunter on 3/16/2022 11:53 AM       Physical Exam:  Vitals: /65   Pulse 76   Temp 98 °F (36.7 °C) (Axillary)   Resp 16   Ht 6' 1\" (1.854 m)   Wt (!) 327 lb (148.3 kg)   SpO2 97%   BMI 43.14 kg/m²   24 hour intake/output:    Intake/Output Summary (Last 24 hours) at 3/19/2022 0913  Last data filed at 3/19/2022 2984  Gross per 24 hour   Intake 4586.21 ml   Output 1200 ml   Net 3386.21 ml     Last 3 weights: Wt Readings from Last 3 Encounters:   03/18/22 (!) 327 lb (148.3 kg)   03/03/22 (!) 333 lb 6.4 oz (151.2 kg)   02/15/22 (!) 338 lb 9.6 oz (153.6 kg)       General appearance - oriented to person, place, and time and overweight  HEENT: Normocephalic and Atraumatic  Chest - clear to auscultation, no wheezes, rales or rhonchi, symmetric air entry  Cardiovascular - normal rate and regular rhythm  Abdomen - BS active.  Soft, round  Wound: mesh that is exposed, purulent drainage seems to be decreasing per patient  Neurological - Alert and oriented and Normal speech  Integumentary - Skin color, texture, turgor normal. No Rashes or lesions  Musculoskeletal -Full ROM times 4 extremities      DVT prophylaxis: [x] Lovenox                                 [] SCDs                                 [] SQ Heparin                                 [] Encourage ambulation           [] Already on Anticoagulation                 Assessment:  1. Infected mesh -no clinical evidence of sepsis at this point  2. Cultures prelim staph coag positive   3. CAROLE on CKD  4. CHF- HTN   5. COPD  6. DM  7. LAURIE  8. Chronic back pain - percocet   9. 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 *      Plan:  1. Conservative treatment  2. Carb control low sodium diet   3. Analgesia and antiemetics as needed  4. Antibiotic therapy zyvox, cefepime   5. Monitor Labs, lytes per protocol  6. medicine management   7. Nephrology and cardiology following   8. Surgical intervention originally planned for March 25 to remove infected mesh.  We will re-evaluate on Monday       Electronically signed by Kennedy Pedraza MD on 3/19/2022 at 9:13 AM

## 2022-03-19 NOTE — RT PROTOCOL NOTE
RT Inhaler-Nebulizer Bronchodilator Protocol Note    There is a bronchodilator order in the chart from a provider indicating to follow the RT Bronchodilator Protocol and there is an Initiate RT Inhaler-Nebulizer Bronchodilator Protocol order as well (see protocol at bottom of note). CXR Findings:  XR CHEST PORTABLE    Result Date: 3/18/2022  Impression: Cardiomegaly, no CHF. Small right pleural effusion. This document has been electronically signed by: Vanessa Canela MD on 03/18/2022 07:27 PM      The findings from the last RT Protocol Assessment were as follows:   History Pulmonary Disease: None or smoker <15 pack years  Respiratory Pattern: Dyspnea on exertion or RR 21-25 bpm  Breath Sounds: Slightly diminished and/or crackles  Cough: Strong, spontaneous, non-productive  Indication for Bronchodilator Therapy: Decreased or absent breath sounds  Bronchodilator Assessment Score: 4    Aerosolized bronchodilator medication orders have been revised according to the RT Inhaler-Nebulizer Bronchodilator Protocol below. Respiratory Therapist to perform RT Therapy Protocol Assessment initially then follow the protocol. Repeat RT Therapy Protocol Assessment PRN for score 0-3 or on second treatment, BID, and PRN for scores above 3. No Indications - adjust the frequency to every 6 hours PRN wheezing or bronchospasm, if no treatments needed after 48 hours then discontinue using Per Protocol order mode. If indication present, adjust the RT bronchodilator orders based on the Bronchodilator Assessment Score as indicated below. Use Inhaler orders unless patient has one or more of the following: on home nebulizer, not able to hold breath for 10 seconds, is not alert and oriented, cannot activate and use MDI correctly, or respiratory rate 25 breaths per minute or more, then use the equivalent nebulizer order(s) with same Frequency and PRN reasons based on the score.   If a patient is on this medication at home then do not decrease Frequency below that used at home. 0-3 - enter or revise RT bronchodilator order(s) to equivalent RT Bronchodilator order with Frequency of every 4 hours PRN for wheezing or increased work of breathing using Per Protocol order mode. 4-6 - enter or revise RT Bronchodilator order(s) to two equivalent RT bronchodilator orders with one order with BID Frequency and one order with Frequency of every 4 hours PRN wheezing or increased work of breathing using Per Protocol order mode. 7-10 - enter or revise RT Bronchodilator order(s) to two equivalent RT bronchodilator orders with one order with TID Frequency and one order with Frequency of every 4 hours PRN wheezing or increased work of breathing using Per Protocol order mode. 11-13 - enter or revise RT Bronchodilator order(s) to one equivalent RT bronchodilator order with QID Frequency and an Albuterol order with Frequency of every 4 hours PRN wheezing or increased work of breathing using Per Protocol order mode. Greater than 13 - enter or revise RT Bronchodilator order(s) to one equivalent RT bronchodilator order with every 4 hours Frequency and an Albuterol order with Frequency of every 2 hours PRN wheezing or increased work of breathing using Per Protocol order mode. RT to enter RT Home Evaluation for COPD & MDI Assessment order using Per Protocol order mode.     Electronically signed by Roseann Vazquez RCP on 3/19/2022 at 7:35 AM

## 2022-03-19 NOTE — PROGRESS NOTES
exacerbation. EF=55-60% on ECHO 5/25/21. Implanted CardioMEMS 6/16/2021. [address] 12/1/2020 demonstrating elevated LVEDP. Patient follows with CHF clinic.  - Patient not currently taking Entresto, spironolactone, carvedilol or Bumex per chart review and recommendations of outside provider per patient  - Will need CardioMEMS download to assess fluid status; will attempt to have this completed inpatient. - Cardiology consulted     COPD GOLD 2 - Without exacerbation. PFT 3/15/2021 FEV1= 75% of predicted. - Continue home Montelukast, Symbicort, Albuterol     NIDDM2 - Uncontrolled. A1c=9.9 (5/24/21)  - high dose ssi, POCT glucose 4x daily AC/HS, Hypoglycemia protocol  - Holding gabapentin for CAROLE  - Resume Elavil for neuropathic pain  - Inpatient BG goal 140-180     Primary HTN - Pressures are on the low/normal side. . Patient is not taking home hydralazine per recommendations of outside provider.  - Reduce hydralazine to 25mg three times daily    LAURIE - CPAP at home  - Continue home CPAP     BPH - Noted  - Holding Tamsulosin for CAROLE     Hx of hypothyroidism - Noted  - Continue Synthroid     Hx of chronic back pain - Noted  - Resume home percocet  - Hold Flexeril for category X interaction with Linezolid    High anion gap metabolic acidosis - Resolved. Secondary to sepsis and elevated lactic acid. ?Pulmonary embolism - Resolved. Pulmonary embolism was considered within the ED, but CTA not obtainable due to worsening renal function. V/Q scan demonstrated low probability of pulmonary embolism. Moderate Hypokalemia - Resolved. Secondary to diuretic use. Patient also received 80mg Lasix within the ED.  Magnesium and phosphorus within normal limits  - Continue potassium replacement as needed  - Continuous telemetry      Expected discharge date:  TBD    Disposition:    [x] Home       [] TCU       [] Rehab       [] Psych       [] SNF       [] [place]       [] Other -    Chief Complaint: Fever    Hospital Course:  Neeru Siddiqi is a 63 y.o. male with PMHx of HTN, CHF, BPH, LAURIE, hypothyroidism, chronic back pain, CKD, and NIDDM2 who presented to [facility] with complaint of fever that began approximately 5 days prior to presentation that was self-reported as high as 101F. The patient had associated symptoms of diaphoresis. He noted that he also has increased cough and shortness of breath from his baseline during these complaints. He has been taking Ibuprofen for his fever and OTC cough medicine for his cough which he states helps. The patient reported that his shortness of breath as well as his cough have been occurring for the last 4 months and have not worsened over that time period. He also stated that he is recently been instructed to hold his Bumex as well as his Entresto from his nephrologist and CHF clinic respectively. He denies headache, lightheadedness, change in vision, rhinorrhea, congestion, sore throat, cough, hemoptysis, chest pain, palpitations, abdominal pain, nausea, vomiting, hematemesis, change in bowel/bladder habits, hematochezia, hematuria, weakness, difficulty with ambulation, numbness/tingling, or known exposure to sick contacts. He admits to fever, chills, AKINS, PND, and shortness of breath. Within the emergency department the patient was administered Lasix 80mg, ASA 324mg, 1 dose of Ceftriaxone 1g, and 4mg morphine, He also underwent CT of the chest which demonstrated borderline cardiomegaly, calcification of the LAD and RCA coronary arteries, small right pleural effusion and splenomegaly. The patient was admitted to the hospital service for further care and management. Orders were placed for general surgery consult. Subjective (past 24 hours): Patient had worsening shortness of breath, chest pressure and required an increase in supplemental oxygen. On examination his respirations were labored decreased air entry.   Chest x-ray revealed mild increase in bilateral airspace disease. Patient was administered nebulized albuterol with excellent response. EKG and troponin obtained were unremarkable. He has been receiving an increased amount of fluid throughout the last 24 hours secondary to antibiotic administration which is a contributing factor to her shortness of breath and overall volume status. He does appear mildly volume overloaded on examination this morning. Plan is for discontinuation of maintenance fluids as well as diuretics for the day. Additional data from a CardioMEMS download would be helpful however this has been unsuccessful. Cardiology will be contacted again today for assistance. Review of Systems: 12 point review of systems completed and pertinent positives are noted in the HPI. All other systems reviewed and negative.     Medications:  Reviewed    Infusion Medications    sodium chloride 40 mL/hr at 03/19/22 8398    sodium chloride      dextrose       Scheduled Medications    lidocaine  1 patch TransDERmal Daily    insulin lispro  0-6 Units SubCUTAneous TID WC    insulin lispro  0-3 Units SubCUTAneous Nightly    ipratropium-albuterol  1 ampule Inhalation Q4H WA    cefepime  2,000 mg IntraVENous Q12H    sodium chloride flush  5-40 mL IntraVENous 2 times per day    enoxaparin  40 mg SubCUTAneous BID    linezolid  600 mg IntraVENous Q12H    [Held by provider] amitriptyline  10 mg Oral Nightly    aspirin EC  81 mg Oral Daily    [Held by provider] doxepin  50 mg Oral Nightly    budesonide-formoterol  2 puff Inhalation BID    montelukast  10 mg Oral Nightly    levothyroxine  175 mcg Oral Daily    rOPINIRole  1 mg Oral Q24H    oxybutynin  10 mg Oral Daily    rOPINIRole  2 mg Oral Nightly    carvedilol  25 mg Oral BID    hydrALAZINE  100 mg Oral 3 times per day     PRN Meds: sodium chloride flush, sodium chloride, ondansetron **OR** ondansetron, polyethylene glycol, acetaminophen **OR** acetaminophen, potassium chloride **OR** potassium alternative oral replacement **OR** potassium chloride, albuterol sulfate HFA, oxyCODONE-acetaminophen, [Held by provider] cyclobenzaprine, glucagon (rDNA), dextrose, glucose, dextrose bolus (hypoglycemia)      Intake/Output Summary (Last 24 hours) at 3/19/2022 0713  Last data filed at 3/19/2022 1805  Gross per 24 hour   Intake 4886.21 ml   Output 1200 ml   Net 3686.21 ml       Diet:  ADULT DIET; Regular; 4 carb choices (60 gm/meal); Low Sodium (2 gm)    Exam:  BP (!) 105/55   Pulse 101   Temp 98.6 °F (37 °C) (Oral)   Resp 16   Ht 6' 1\" (1.854 m)   Wt (!) 327 lb (148.3 kg)   SpO2 91%   BMI 43.14 kg/m²     Physical Exam  Vitals and nursing note reviewed. Constitutional:       General: He is awake. Appearance: Normal appearance. He is morbidly obese. Interventions: Nasal cannula in place. HENT:      Head: Normocephalic and atraumatic. Right Ear: External ear normal.      Left Ear: External ear normal.      Nose: Nose normal.      Mouth/Throat:      Mouth: Mucous membranes are moist.      Pharynx: Oropharynx is clear. Eyes:      Conjunctiva/sclera: Conjunctivae normal.      Pupils: Pupils are equal, round, and reactive to light. Cardiovascular:      Rate and Rhythm: Normal rate and regular rhythm. Pulses: Normal pulses. Dorsalis pedis pulses are 2+ on the right side and 2+ on the left side. Posterior tibial pulses are 2+ on the right side and 2+ on the left side. Heart sounds: Normal heart sounds. Pulmonary:      Effort: Pulmonary effort is normal.      Breath sounds: Normal breath sounds. Decreased air movement present. No wheezing. Abdominal:      General: A surgical scar is present. Bowel sounds are normal.      Palpations: Abdomen is soft. Tenderness: There is no abdominal tenderness. Musculoskeletal:         General: Normal range of motion. Cervical back: Normal range of motion and neck supple. Right lower leg: No edema.       Left Amish Ford MD on    03/18/2022 07:27 PM      US RENAL COMPLETE   Final Result       1. Bilateral renal cysts. 2. No definite evidence of hydronephrosis on either side. 3. The bladder was not visualized. .               **This report has been created using voice recognition software. It may contain minor errors which are inherent in voice recognition technology. **      Final report electronically signed by DR Rodo Miguel on 3/18/2022 11:30 AM      NM LUNG VENT/PERFUSION (VQ)   Final Result   Low probability of pulmonary embolus. Normal ventilation bilaterally with good washout and no areas of trapping. This document has been electronically signed by: Taz Ramirez MD on    03/16/2022 05:52 PM      CT CHEST WO CONTRAST   Final Result       1. Borderline cardiomegaly. Calcification in the left anterior descending and right coronary arteries. 2. 13 mm metallic density in the left lower lobe pulmonary artery, new since previous CT scan of the chest dated 24th of May 2021. 3. Small right pleural effusion. 4. Small mediastinal lymph nodes. 5. Thickening off the interstitial lung markings. 6. Thoracic spondylosis. 7. Splenomegaly. 8. Small hiatal hernia. **This report has been created using voice recognition software. It may contain minor errors which are inherent in voice recognition technology. **      Final report electronically signed by DR Rodo Miguel on 3/16/2022 12:47 PM      XR CHEST PORTABLE   Final Result   1. Mild cardiomegaly and increased pulmonary vascularity. 2. Otherwise negative chest x-ray. .               **This report has been created using voice recognition software. It may contain minor errors which are inherent in voice recognition technology. **      Final report electronically signed by DR Rodo Miguel on 3/16/2022 11:53 AM          DVT prophylaxis:  [x] Lovenox  [] SCDs  [] SQ Heparin  [] Encourage ambulation  [] Already on anticoagulation  [] Other -      Code Status: Full Code    PT/OT Evaluation Status: N/A    Telemetry:  [x] Yes  [] No    Electronically signed by Tony Vargas DO, MBA on 3/19/2022 at 7:13 AM

## 2022-03-19 NOTE — PLAN OF CARE
Problem: Pain:  Goal: Pain level will decrease  Description: Pain level will decrease  3/19/2022 0537 by Charu Reno RN  Outcome: Met This Shift     Problem: Pain:  Goal: Control of chronic pain  Description: Control of chronic pain  3/19/2022 0537 by Charu Reno RN  Outcome: Met This Shift     Problem: Falls - Risk of:  Goal: Will remain free from falls  Description: Will remain free from falls  3/19/2022 0537 by Charu Reno RN  Outcome: Met This Shift     Problem: Falls - Risk of:  Goal: Absence of physical injury  Description: Absence of physical injury  3/19/2022 0537 by Charu Reno RN  Outcome: Met This Shift     Problem: Skin Integrity:  Goal: Will show no infection signs and symptoms  Description: Will show no infection signs and symptoms  3/19/2022 0537 by Charu Reno RN  Outcome: Met This Shift     Problem: Skin Integrity:  Goal: Absence of new skin breakdown  Description: Absence of new skin breakdown  3/19/2022 0537 by Charu Reno RN  Outcome: Met This Shift     Problem: Pain:  Goal: Control of acute pain  Description: Control of acute pain  3/19/2022 0537 by Charu Reno RN  Outcome: Ongoing

## 2022-03-20 LAB
ANION GAP SERPL CALCULATED.3IONS-SCNC: 15 MEQ/L (ref 8–16)
BUN BLDV-MCNC: 38 MG/DL (ref 7–22)
CALCIUM SERPL-MCNC: 8.5 MG/DL (ref 8.5–10.5)
CHLORIDE BLD-SCNC: 99 MEQ/L (ref 98–111)
CO2: 21 MEQ/L (ref 23–33)
CREAT SERPL-MCNC: 2.6 MG/DL (ref 0.4–1.2)
GFR SERPL CREATININE-BSD FRML MDRD: 25 ML/MIN/1.73M2
GLUCOSE BLD-MCNC: 111 MG/DL (ref 70–108)
GLUCOSE BLD-MCNC: 136 MG/DL (ref 70–108)
GLUCOSE BLD-MCNC: 90 MG/DL (ref 70–108)
GLUCOSE BLD-MCNC: 99 MG/DL (ref 70–108)
GLUCOSE BLD-MCNC: 99 MG/DL (ref 70–108)
MAGNESIUM: 1.9 MG/DL (ref 1.6–2.4)
POTASSIUM SERPL-SCNC: 3.4 MEQ/L (ref 3.5–5.2)
SODIUM BLD-SCNC: 135 MEQ/L (ref 135–145)

## 2022-03-20 PROCEDURE — 6360000002 HC RX W HCPCS: Performed by: STUDENT IN AN ORGANIZED HEALTH CARE EDUCATION/TRAINING PROGRAM

## 2022-03-20 PROCEDURE — 94669 MECHANICAL CHEST WALL OSCILL: CPT

## 2022-03-20 PROCEDURE — 6360000002 HC RX W HCPCS: Performed by: INTERNAL MEDICINE

## 2022-03-20 PROCEDURE — 2060000000 HC ICU INTERMEDIATE R&B

## 2022-03-20 PROCEDURE — 6370000000 HC RX 637 (ALT 250 FOR IP): Performed by: STUDENT IN AN ORGANIZED HEALTH CARE EDUCATION/TRAINING PROGRAM

## 2022-03-20 PROCEDURE — 6370000000 HC RX 637 (ALT 250 FOR IP): Performed by: INTERNAL MEDICINE

## 2022-03-20 PROCEDURE — 99233 SBSQ HOSP IP/OBS HIGH 50: CPT | Performed by: INTERNAL MEDICINE

## 2022-03-20 PROCEDURE — 83735 ASSAY OF MAGNESIUM: CPT

## 2022-03-20 PROCEDURE — 2580000003 HC RX 258: Performed by: STUDENT IN AN ORGANIZED HEALTH CARE EDUCATION/TRAINING PROGRAM

## 2022-03-20 PROCEDURE — 99232 SBSQ HOSP IP/OBS MODERATE 35: CPT | Performed by: INTERNAL MEDICINE

## 2022-03-20 PROCEDURE — 80048 BASIC METABOLIC PNL TOTAL CA: CPT

## 2022-03-20 PROCEDURE — 94760 N-INVAS EAR/PLS OXIMETRY 1: CPT

## 2022-03-20 PROCEDURE — 2500000003 HC RX 250 WO HCPCS: Performed by: STUDENT IN AN ORGANIZED HEALTH CARE EDUCATION/TRAINING PROGRAM

## 2022-03-20 PROCEDURE — 94640 AIRWAY INHALATION TREATMENT: CPT

## 2022-03-20 PROCEDURE — 82948 REAGENT STRIP/BLOOD GLUCOSE: CPT

## 2022-03-20 PROCEDURE — 36415 COLL VENOUS BLD VENIPUNCTURE: CPT

## 2022-03-20 PROCEDURE — 2700000000 HC OXYGEN THERAPY PER DAY

## 2022-03-20 RX ORDER — HEPARIN SODIUM 5000 [USP'U]/ML
5000 INJECTION, SOLUTION INTRAVENOUS; SUBCUTANEOUS EVERY 8 HOURS SCHEDULED
Status: DISPENSED | OUTPATIENT
Start: 2022-03-20 | End: 2022-03-24

## 2022-03-20 RX ADMIN — OXYBUTYNIN CHLORIDE 10 MG: 10 TABLET, EXTENDED RELEASE ORAL at 07:24

## 2022-03-20 RX ADMIN — SODIUM CHLORIDE, PRESERVATIVE FREE 10 ML: 5 INJECTION INTRAVENOUS at 06:11

## 2022-03-20 RX ADMIN — OXYCODONE AND ACETAMINOPHEN 1 TABLET: 10; 325 TABLET ORAL at 22:03

## 2022-03-20 RX ADMIN — LINEZOLID 600 MG: 600 INJECTION, SOLUTION INTRAVENOUS at 23:06

## 2022-03-20 RX ADMIN — IPRATROPIUM BROMIDE AND ALBUTEROL SULFATE 1 AMPULE: .5; 3 SOLUTION RESPIRATORY (INHALATION) at 07:34

## 2022-03-20 RX ADMIN — LEVOTHYROXINE SODIUM 175 MCG: 0.15 TABLET ORAL at 06:11

## 2022-03-20 RX ADMIN — LINEZOLID 600 MG: 600 INJECTION, SOLUTION INTRAVENOUS at 12:38

## 2022-03-20 RX ADMIN — POTASSIUM CHLORIDE 40 MEQ: 1500 TABLET, EXTENDED RELEASE ORAL at 06:42

## 2022-03-20 RX ADMIN — SODIUM CHLORIDE, PRESERVATIVE FREE 10 ML: 5 INJECTION INTRAVENOUS at 20:52

## 2022-03-20 RX ADMIN — BUDESONIDE AND FORMOTEROL FUMARATE DIHYDRATE 2 PUFF: 160; 4.5 AEROSOL RESPIRATORY (INHALATION) at 07:40

## 2022-03-20 RX ADMIN — ONDANSETRON 4 MG: 4 TABLET, ORALLY DISINTEGRATING ORAL at 16:29

## 2022-03-20 RX ADMIN — HYDRALAZINE HYDROCHLORIDE 25 MG: 25 TABLET, FILM COATED ORAL at 07:24

## 2022-03-20 RX ADMIN — SODIUM CHLORIDE, PRESERVATIVE FREE 10 ML: 5 INJECTION INTRAVENOUS at 07:24

## 2022-03-20 RX ADMIN — ENOXAPARIN SODIUM 40 MG: 100 INJECTION SUBCUTANEOUS at 07:24

## 2022-03-20 RX ADMIN — HEPARIN SODIUM 5000 UNITS: 5000 INJECTION INTRAVENOUS; SUBCUTANEOUS at 21:06

## 2022-03-20 RX ADMIN — MONTELUKAST SODIUM 10 MG: 10 TABLET ORAL at 20:52

## 2022-03-20 RX ADMIN — ROPINIROLE HYDROCHLORIDE 2 MG: 1 TABLET, FILM COATED ORAL at 20:52

## 2022-03-20 RX ADMIN — CARVEDILOL 25 MG: 25 TABLET, FILM COATED ORAL at 18:15

## 2022-03-20 RX ADMIN — HYDRALAZINE HYDROCHLORIDE 25 MG: 25 TABLET, FILM COATED ORAL at 14:46

## 2022-03-20 RX ADMIN — IPRATROPIUM BROMIDE AND ALBUTEROL SULFATE 1 AMPULE: .5; 3 SOLUTION RESPIRATORY (INHALATION) at 17:56

## 2022-03-20 RX ADMIN — ROPINIROLE HYDROCHLORIDE 1 MG: 1 TABLET, FILM COATED ORAL at 14:46

## 2022-03-20 RX ADMIN — CEFEPIME 2000 MG: 2 INJECTION, POWDER, FOR SOLUTION INTRAMUSCULAR; INTRAVENOUS at 06:11

## 2022-03-20 RX ADMIN — OXYCODONE AND ACETAMINOPHEN 1 TABLET: 10; 325 TABLET ORAL at 04:01

## 2022-03-20 RX ADMIN — BUDESONIDE AND FORMOTEROL FUMARATE DIHYDRATE 2 PUFF: 160; 4.5 AEROSOL RESPIRATORY (INHALATION) at 17:56

## 2022-03-20 RX ADMIN — CARVEDILOL 25 MG: 25 TABLET, FILM COATED ORAL at 07:24

## 2022-03-20 RX ADMIN — HYDRALAZINE HYDROCHLORIDE 25 MG: 25 TABLET, FILM COATED ORAL at 20:52

## 2022-03-20 RX ADMIN — ASPIRIN 81 MG: 81 TABLET, COATED ORAL at 07:24

## 2022-03-20 ASSESSMENT — PAIN - FUNCTIONAL ASSESSMENT: PAIN_FUNCTIONAL_ASSESSMENT: ACTIVITIES ARE NOT PREVENTED

## 2022-03-20 ASSESSMENT — PAIN SCALES - GENERAL
PAINLEVEL_OUTOF10: 7
PAINLEVEL_OUTOF10: 0
PAINLEVEL_OUTOF10: 8
PAINLEVEL_OUTOF10: 0
PAINLEVEL_OUTOF10: 7

## 2022-03-20 ASSESSMENT — PAIN DESCRIPTION - DESCRIPTORS: DESCRIPTORS: ACHING

## 2022-03-20 ASSESSMENT — PAIN DESCRIPTION - DIRECTION: RADIATING_TOWARDS: BLE

## 2022-03-20 ASSESSMENT — PAIN DESCRIPTION - PAIN TYPE: TYPE: CHRONIC PAIN

## 2022-03-20 ASSESSMENT — PAIN DESCRIPTION - ORIENTATION: ORIENTATION: LOWER

## 2022-03-20 ASSESSMENT — PAIN DESCRIPTION - ONSET: ONSET: ON-GOING

## 2022-03-20 ASSESSMENT — PAIN DESCRIPTION - LOCATION: LOCATION: BACK

## 2022-03-20 ASSESSMENT — PAIN DESCRIPTION - PROGRESSION: CLINICAL_PROGRESSION: NOT CHANGED

## 2022-03-20 ASSESSMENT — PAIN DESCRIPTION - FREQUENCY: FREQUENCY: CONTINUOUS

## 2022-03-20 NOTE — PLAN OF CARE
Problem: Impaired respiratory status  Goal: Clear lung sounds  Description: Clear lung sounds  Outcome: Ongoing   Breath sounds diminished, continuing treatments per protocol.

## 2022-03-20 NOTE — PROGRESS NOTES
Hospitalist Progress Note    Patient:  Neeru Siddiqi    YOB: 1960  Unit/Bed:4K-16/016-A  MRN: [id number]    Acct: [de-identified]   PCP: Fidencio Leung DO    Date of Admission: 3/16/2022      Assessment/Plan:    Sepsis secondary to abdominal wound - present on arrival. SIRS 2/4 (Tachycardia and tachypnea). Abdominal wound is from dehiscence of hernia repair in 2018 with surgical mesh exposure, now with purulent discharge. Patient received 1 dose Ceftriaxone within the ED. 30cc/kg fluid bolus completed  - Blood Cx NGTD  - Wound Cx MRSA and Corynebacterium species (unclear if contaminant)  - Continue Linezolid and hold Cefepime  - Surgery consulted and will re-evaluate on 3/21/22 for surgical intervention.   -Cardio and Nephro on to assist with preop optimization  -CT abdomen identifies no abscess.      Acute hypoxic respiratory failure -Improving. Suspicious for fluid overload, worsening while on fluids. Negative viral respiratory panel on 3/19.   - Wean supplemental oxygen as tolerated to maintain SpO2>92%. - CardioMEMS download would be helpful - appreciate Cardiology assistance. - Hold maintenance fluids and limit volume if possible  - Continue Duoneb twice daily (possible bronchospastic component) - has been helping   -avoid steroids since open wound of abdomen  - Diuretic trialed on 3/19, 60 lasix IV - not much change in UOP and worse creat.     -Hold off on diuretic/fluid for now. Appreciate nephro assistance.   -Weaned to RA on 3/20     Stage 1 CAROLE on CKD 3a with renal progression to 3b - worsening. Renal progression appears to have occurred around 2/2022 and likely medication related with adjustments to home Bumex regimen. This may be his new baseline.  Patient received 80mg Lasix within ED on arrival.  - Avoid nephrotoxic agents and renally dose medications  - Daily standing weights, strict I/O  - Daily BMP   - heparin sq for DVT ppx.      Elevated microalbumin/Creatinine ratio - 1060mg/g. Likely secondary to hypertension and diabetes. Patient follows with Dr. Patsy Paulson.  - Nephrology following     Elevated troponin - Secondary to type II demand ischemia. Patient denies chest pain, EKG without ST or T-wave abnormalities. - No indication to trend     Chronic systolic heart failure NYHA II - Without exacerbation. EF=55-60% on ECHO 5/25/21. Implanted CardioMEMS 6/16/2021. [address] 12/1/2020 demonstrating elevated LVEDP. Patient follows with CHF clinic.  - Patient not currently taking Entresto, spironolactone, carvedilol or Bumex per chart review and recommendations of outside provider per patient  - Will need CardioMEMS download to assess fluid status; will attempt to have this completed inpatient. - Cardiology consulted - appreciate recs.      COPD GOLD 2 - Without exacerbation. PFT 3/15/2021 FEV1= 75% of predicted. - Continue home Montelukast, Symbicort  - added on duonebs, BID since already on symbicort. - Acapella and IS     NIDDM2 - Uncontrolled. A1c=9.9 (5/24/21)  - high dose ssi, POCT glucose 4x daily AC/HS, Hypoglycemia protocol  - Holding gabapentin for CAROLE. - Hold Elavil since on Zyvox. - Inpatient BG goal 140-180     Primary HTN - Pressures are on the low/normal side. . Patient is not taking home hydralazine per recommendations of outside provider.  - Reduce hydralazine to 25mg three times daily     LAURIE - CPAP at home  - Continue home CPAP     BPH - Noted  - Holding Tamsulosin for CAROLE     Hx of hypothyroidism - Noted  - Continue Synthroid     Hx of chronic back pain - Noted  - Resume home percocet  - Hold Flexeril for category X interaction with Linezolid     High anion gap metabolic acidosis - Resolved. Secondary to sepsis and elevated lactic acid.     Moderate Hypokalemia - Intermittent. . Secondary to diuretic use. Patient also received 80mg Lasix within the ED.  Magnesium and phosphorus within normal limits  - Continue potassium replacement as needed  - Continuous telemetry      Expected discharge date:  >2 d     Disposition: tbd, likely home  [] Home  [] TCU  [] Rehab  [] Psych  [] SNF  [] [facility]  [] Other-    ===================================================================    Chief Complaint: Fever     Hospital Course: Per previous notes, Neto Poole is a 63 y.o. male with PMHx of HTN, CHF, BPH, LAURIE, hypothyroidism, chronic back pain, CKD, and NIDDM2 who presented to [address] [place] [address] with complaint of fever that began approximately 5 days prior to presentation that was self-reported as high as 101F. The patient had associated symptoms of diaphoresis. He noted that he also has increased cough and shortness of breath from his baseline during these complaints. He has been taking Ibuprofen for his fever and OTC cough medicine for his cough which he states helps. The patient reported that his shortness of breath as well as his cough have been occurring for the last 4 months and have not worsened over that time period. He also stated that he is recently been instructed to hold his Bumex as well as his Entresto from his nephrologist and CHF clinic respectively. He denies headache, lightheadedness, change in vision, rhinorrhea, congestion, sore throat, cough, hemoptysis, chest pain, palpitations, abdominal pain, nausea, vomiting, hematemesis, change in bowel/bladder habits, hematochezia, hematuria, weakness, difficulty with ambulation, numbness/tingling, or known exposure to sick contacts. He admits to fever, chills, AKINS, PND, and shortness of breath.     Within the emergency department the patient was administered Lasix 80mg, ASA 324mg, 1 dose of Ceftriaxone 1g, and 4mg morphine, He also underwent CT of the chest which demonstrated borderline cardiomegaly, calcification of the LAD and RCA coronary arteries, small right pleural effusion and splenomegaly. The patient was admitted to the hospital service for further care and management.  Orders were placed for general surgery consult\". Subjective (past 24 hours): Patient seen at bedside. He denies any new issues today. Reports improvement in breathing - thinks duonebs helped. Still coughing dry, but slightly better. No fevers or chills. No new abdominal issues. Overall relatively stable LE edema, however reports he feels like he had more UOP after the dose of lasix. Medications:  Reviewed    Infusion Medications    sodium chloride      dextrose       Scheduled Medications    ipratropium-albuterol  1 ampule Inhalation BID    hydrALAZINE  25 mg Oral TID    lidocaine  1 patch TransDERmal Daily    insulin lispro  0-6 Units SubCUTAneous TID WC    insulin lispro  0-3 Units SubCUTAneous Nightly    cefepime  2,000 mg IntraVENous Q12H    sodium chloride flush  5-40 mL IntraVENous 2 times per day    enoxaparin  40 mg SubCUTAneous BID    linezolid  600 mg IntraVENous Q12H    [Held by provider] amitriptyline  10 mg Oral Nightly    aspirin EC  81 mg Oral Daily    [Held by provider] doxepin  50 mg Oral Nightly    budesonide-formoterol  2 puff Inhalation BID    montelukast  10 mg Oral Nightly    levothyroxine  175 mcg Oral Daily    rOPINIRole  1 mg Oral Q24H    oxybutynin  10 mg Oral Daily    rOPINIRole  2 mg Oral Nightly    carvedilol  25 mg Oral BID     PRN Meds: ipratropium-albuterol, sodium chloride flush, sodium chloride, ondansetron **OR** ondansetron, polyethylene glycol, acetaminophen **OR** acetaminophen, potassium chloride **OR** potassium alternative oral replacement **OR** potassium chloride, albuterol sulfate HFA, oxyCODONE-acetaminophen, [Held by provider] cyclobenzaprine, glucagon (rDNA), dextrose, glucose, dextrose bolus (hypoglycemia)      ROS: reviewed from prior note, full ROS unchanged unless otherwise stated in hospital course/subjective portion.          Intake/Output Summary (Last 24 hours) at 3/20/2022 0815  Last data filed at 3/20/2022 0611  Gross per 24 hour   Intake 1301.39 ml   Output 1300 ml   Net 1.39 ml       Exam:  /69   Pulse 71   Temp 98 °F (36.7 °C) (Oral)   Resp 16   Ht 6' 1\" (1.854 m)   Wt (!) 327 lb (148.3 kg)   SpO2 95%   BMI 43.14 kg/m²     General appearance: No distress, well developed, appears stated age. Morbid obesity. Eyes:  PERRL. Conjunctivae/corneas clear. HENT: Head normal appearing. Nares normal. Oral mucosa moist.  Hearing intact. Neck: Supple, with full range of motion. Trachea midline. No gross JVD appreciated. Respiratory:  Clear to auscultation, without wheezes or rhonchi. Overall sounds diminished with faint crackles noted. Cardiovascular: Normal rate, regular rhythm with normal S1/S2 without murmurs. Trace le edema. Abdomen: Soft, non-tender, non-distended with normal bowel sounds. Open annular wound of mid abdomen with abdominal mesh present. Musculoskeletal: No joint swelling or tenderness. Normal tone. No abnormal movements. Skin: Warm and dry. No rashes or lesions. Neurologic:  No focal sensory/motor deficits in the upper or lower extremities. Cranial nerves:  grossly non-focal 2-12. Psychiatric: Alert and oriented, normal insight and thought content. Capillary Refill: Brisk,< 3 seconds. Peripheral Pulses: +2 palpable, equal bilaterally. Labs:   Recent Labs     03/19/22 0432   WBC 5.4   HGB 7.8*   HCT 25.8*        Recent Labs     03/18/22 0431 03/19/22 0432 03/20/22 0426    135 135   K 3.6 3.6 3.4*    100 99   CO2 21* 21* 21*   BUN 28* 31* 38*   CREATININE 2.2* 2.4* 2.6*   CALCIUM 8.5 8.5 8.5   PHOS  --  4.4  --      Recent Labs     03/18/22 0431   AST 15   ALT 19   BILIDIR <0.2   BILITOT 0.4   ALKPHOS 104     No results for input(s): INR in the last 72 hours. No results for input(s): Meldon Aguilar in the last 72 hours. No results for input(s): PROCAL in the last 72 hours.    Lab Results   Component Value Date    NITRU NEGATIVE 03/16/2022    WBCUA 15-25 03/16/2022 BACTERIA NONE SEEN 03/16/2022    RBCUA > 100 03/16/2022    BLOODU LARGE 03/16/2022    SPECGRAV 1.011 03/16/2022    GLUCOSEU 500 04/13/2021       Radiology (48 hours):  CT ABDOMEN PELVIS WO CONTRAST Additional Contrast? None    Result Date: 3/19/2022  Gas and fluid external to the abdomen and near the umbilicus along the patient's hernia mesh level. No evidence of intra-abdominal abscess. No acute abdominal or pelvic abnormalities. Multiple chronic findings. Stable appearance of the lung bases. **This report has been created using voice recognition software. It may contain minor errors which are inherent in voice recognition technology. ** Final report electronically signed by Dr. Marcelo Garnica on 3/19/2022 6:32 PM    US RENAL COMPLETE    Result Date: 3/18/2022   1. Bilateral renal cysts. 2. No definite evidence of hydronephrosis on either side. 3. The bladder was not visualized. . **This report has been created using voice recognition software. It may contain minor errors which are inherent in voice recognition technology. ** Final report electronically signed by DR Dennise Strauss on 3/18/2022 11:30 AM    XR CHEST PORTABLE    Result Date: 3/18/2022  Impression: Cardiomegaly, no CHF. Small right pleural effusion. This document has been electronically signed by: Virginia Desai MD on 03/18/2022 07:27 PM       DVT prophylaxis:    [] Lovenox  [] SCDs  [x] SQ Heparin  [] Encourage ambulation   [] Already on Anticoagulation       Diet: ADULT DIET; Regular; 4 carb choices (60 gm/meal);  Low Sodium (2 gm)  Code Status: Full Code  PT/OT:   Tele: yes  IVF: na    Electronically signed by Stefania Oshea DO on 3/20/2022 at 8:15 AM

## 2022-03-20 NOTE — RT PROTOCOL NOTE
RT Inhaler-Nebulizer Bronchodilator Protocol Note    There is a bronchodilator order in the chart from a provider indicating to follow the RT Bronchodilator Protocol and there is an Initiate RT Inhaler-Nebulizer Bronchodilator Protocol order as well (see protocol at bottom of note). CXR Findings:  XR CHEST PORTABLE    Result Date: 3/18/2022  Impression: Cardiomegaly, no CHF. Small right pleural effusion. This document has been electronically signed by: Keyana Clifton MD on 03/18/2022 07:27 PM      The findings from the last RT Protocol Assessment were as follows:   History Pulmonary Disease: None or smoker <15 pack years  Respiratory Pattern: Dyspnea on exertion or RR 21-25 bpm  Breath Sounds: Slightly diminished and/or crackles  Cough: Strong, spontaneous, non-productive  Indication for Bronchodilator Therapy: Decreased or absent breath sounds  Bronchodilator Assessment Score: 4    Aerosolized bronchodilator medication orders have been revised according to the RT Inhaler-Nebulizer Bronchodilator Protocol below. Respiratory Therapist to perform RT Therapy Protocol Assessment initially then follow the protocol. Repeat RT Therapy Protocol Assessment PRN for score 0-3 or on second treatment, BID, and PRN for scores above 3. No Indications - adjust the frequency to every 6 hours PRN wheezing or bronchospasm, if no treatments needed after 48 hours then discontinue using Per Protocol order mode. If indication present, adjust the RT bronchodilator orders based on the Bronchodilator Assessment Score as indicated below. Use Inhaler orders unless patient has one or more of the following: on home nebulizer, not able to hold breath for 10 seconds, is not alert and oriented, cannot activate and use MDI correctly, or respiratory rate 25 breaths per minute or more, then use the equivalent nebulizer order(s) with same Frequency and PRN reasons based on the score.   If a patient is on this medication at home then do not decrease Frequency below that used at home. 0-3 - enter or revise RT bronchodilator order(s) to equivalent RT Bronchodilator order with Frequency of every 4 hours PRN for wheezing or increased work of breathing using Per Protocol order mode. 4-6 - enter or revise RT Bronchodilator order(s) to two equivalent RT bronchodilator orders with one order with BID Frequency and one order with Frequency of every 4 hours PRN wheezing or increased work of breathing using Per Protocol order mode. 7-10 - enter or revise RT Bronchodilator order(s) to two equivalent RT bronchodilator orders with one order with TID Frequency and one order with Frequency of every 4 hours PRN wheezing or increased work of breathing using Per Protocol order mode. 11-13 - enter or revise RT Bronchodilator order(s) to one equivalent RT bronchodilator order with QID Frequency and an Albuterol order with Frequency of every 4 hours PRN wheezing or increased work of breathing using Per Protocol order mode. Greater than 13 - enter or revise RT Bronchodilator order(s) to one equivalent RT bronchodilator order with every 4 hours Frequency and an Albuterol order with Frequency of every 2 hours PRN wheezing or increased work of breathing using Per Protocol order mode. RT to enter RT Home Evaluation for COPD & MDI Assessment order using Per Protocol order mode.     Electronically signed by Henry Simpson RCP on 3/20/2022 at 7:39 AM

## 2022-03-20 NOTE — PLAN OF CARE
Problem: Pain:  Goal: Control of chronic pain  Description: Control of chronic pain  Outcome: Met This Shift     Problem: Falls - Risk of:  Goal: Will remain free from falls  Description: Will remain free from falls  Outcome: Met This Shift  Goal: Absence of physical injury  Description: Absence of physical injury  Outcome: Met This Shift     Problem: Skin Integrity:  Goal: Will show no infection signs and symptoms  Description: Will show no infection signs and symptoms  Outcome: Met This Shift  Goal: Absence of new skin breakdown  Description: Absence of new skin breakdown  Outcome: Met This Shift     Problem: Pain:  Goal: Pain level will decrease  Description: Pain level will decrease  Outcome: Ongoing  Goal: Control of acute pain  Description: Control of acute pain  Outcome: Ongoing

## 2022-03-20 NOTE — PLAN OF CARE
Problem: Pain:  Goal: Pain level will decrease  Description: Pain level will decrease  3/20/2022 1107 by Jane Closs, RN  Outcome: Ongoing  Note: Pt has chronic back pain that he rates continuously at a \"7/10. \" Pt's pain goal is to lower to a \"5/10. \" Pain is located in his lower back and radiates to bilateral lower extremities and feet. Pt has PRN medication available per MAR. Non-pharms, such as a heating pad, added to increase comfort and control pain. Pt denies any further questions/complaints at this time. Will continue to monitor. Problem: Falls - Risk of:  Goal: Will remain free from falls  Description: Will remain free from falls  3/20/2022 1107 by Jane Closs, RN  Outcome: Ongoing  Note: Pt remains free from falls this shift. Pt ambulates with a standby assist and a walker. Pt has weakness in BLE, but remains steady on his feet. Standard fall precautions in place. Will continue to monitor. Problem: Skin Integrity:  Goal: Absence of new skin breakdown  Description: Absence of new skin breakdown  3/20/2022 1107 by Jane Closs, RN  Outcome: Ongoing  Note: No new skin breakdown present. Pt has a surgical wound being dressed every shift. Surgery planned for 3/25. Otherwise, no further questions/complaints. Will continue to monitor. Care plan reviewed with patient. Patient verbalizes understanding of the plan of care and contribute to goal setting.

## 2022-03-20 NOTE — PROGRESS NOTES
[facility]         Renal Inpatient Follow-Up note         3/20/2022 12:04 PM    Pt Name:   Jessica Lao:   1960  Attending:   Stefania Oshea DO    Chief Complaint : Garett Juarez is a 63 y.o. male being followed by nephrology for Padilla/CKD/fluid overload    Interval History :   Patient seen and examined by me. No distress  Feels ok. No cp or SOB. Some leg swelling noted   Not in overt congestive heart failure     Scheduled Medications :    ipratropium-albuterol  1 ampule Inhalation BID    hydrALAZINE  25 mg Oral TID    lidocaine  1 patch TransDERmal Daily    insulin lispro  0-6 Units SubCUTAneous TID WC    insulin lispro  0-3 Units SubCUTAneous Nightly    [Held by provider] cefepime  2,000 mg IntraVENous Q12H    sodium chloride flush  5-40 mL IntraVENous 2 times per day    enoxaparin  40 mg SubCUTAneous BID    linezolid  600 mg IntraVENous Q12H    [Held by provider] amitriptyline  10 mg Oral Nightly    aspirin EC  81 mg Oral Daily    [Held by provider] doxepin  50 mg Oral Nightly    budesonide-formoterol  2 puff Inhalation BID    montelukast  10 mg Oral Nightly    levothyroxine  175 mcg Oral Daily    rOPINIRole  1 mg Oral Q24H    oxybutynin  10 mg Oral Daily    rOPINIRole  2 mg Oral Nightly    carvedilol  25 mg Oral BID      sodium chloride      dextrose         Vitals :  /66   Pulse 70   Temp 97.9 °F (36.6 °C) (Oral)   Resp 18   Ht 6' 1\" (1.854 m)   Wt (!) 327 lb (148.3 kg)   SpO2 95%   BMI 43.14 kg/m²     24HR INTAKE/OUTPUT:      Intake/Output Summary (Last 24 hours) at 3/20/2022 1204  Last data filed at 3/20/2022 0840  Gross per 24 hour   Intake 1341.39 ml   Output 1600 ml   Net -258.61 ml     Last 3 weights  Wt Readings from Last 3 Encounters:   03/18/22 (!) 327 lb (148.3 kg)   03/03/22 (!) 333 lb 6.4 oz (151.2 kg)   02/15/22 (!) 338 lb 9.6 oz (153.6 kg)           Physical Exam :  General Appearance:  Well developed.  No distress  Mouth/Throat:  Oral mucosa moist  Neck:  Supple, no JVD  Lungs:  Breath sounds: clear  Heart[de-identified]  S1,S2 heard  Abdomen:  Soft, non - tender  Musculoskeletal:  Edema - noted Rt > left         Last 3 CBC   Recent Labs     03/19/22 0432   WBC 5.4   RBC 3.02*   HGB 7.8*   HCT 25.8*        Last 3 CMP  Recent Labs     03/18/22  0431 03/19/22  0432 03/20/22  0426    135 135   K 3.6 3.6 3.4*    100 99   CO2 21* 21* 21*   BUN 28* 31* 38*   CREATININE 2.2* 2.4* 2.6*   CALCIUM 8.5 8.5 8.5   LABALBU 2.9*  --   --    BILITOT 0.4  --   --              ASSESSMENT / Plan   1 Renal -acute kidney injury, thought to be due to chronic volume depletion from diarrhea however not much improvement with IV hydration, definitely in some positive fluid balance  ? Stop the IV fluids on 3/19/2022 received a dose of diuretic yesterday creatinine has rising slightly  ? Currently monitor off any IV fluids or diuretics BMP in the morning    2 Electrolytes -hypokalemia  3 Essential hypertension running well continue current medications  4 Abdominal wound on antibiotics for surgery soon  5 Hx of diabetes mellitus with diabetic nephropathy  6 Morbid obesity  7 Meds reviewed and discussed with patient    Dr. Cranston Gitelman, MD, M,D.  [facility].

## 2022-03-21 LAB
ABSOLUTE RETIC #: 111 THOU/MM3 (ref 20–115)
ANION GAP SERPL CALCULATED.3IONS-SCNC: 14 MEQ/L (ref 8–16)
BUN BLDV-MCNC: 37 MG/DL (ref 7–22)
CALCIUM SERPL-MCNC: 8.6 MG/DL (ref 8.5–10.5)
CHLORIDE BLD-SCNC: 97 MEQ/L (ref 98–111)
CO2: 23 MEQ/L (ref 23–33)
CREAT SERPL-MCNC: 2.6 MG/DL (ref 0.4–1.2)
ERYTHROCYTE [DISTWIDTH] IN BLOOD BY AUTOMATED COUNT: 17.1 % (ref 11.5–14.5)
ERYTHROCYTE [DISTWIDTH] IN BLOOD BY AUTOMATED COUNT: 51.9 FL (ref 35–45)
GFR SERPL CREATININE-BSD FRML MDRD: 25 ML/MIN/1.73M2
GLUCOSE BLD-MCNC: 135 MG/DL (ref 70–108)
GLUCOSE BLD-MCNC: 81 MG/DL (ref 70–108)
HCT VFR BLD CALC: 25.8 % (ref 42–52)
HEMOGLOBIN: 7.8 GM/DL (ref 14–18)
IMMATURE RETIC FRACT: 39.1 % (ref 2.3–13.4)
MCH RBC QN AUTO: 25.7 PG (ref 26–33)
MCHC RBC AUTO-ENTMCNC: 30.2 GM/DL (ref 32.2–35.5)
MCV RBC AUTO: 84.9 FL (ref 80–94)
MRSA SCREEN RT-PCR: NEGATIVE
PLATELET # BLD: 272 THOU/MM3 (ref 130–400)
PMV BLD AUTO: 8.6 FL (ref 9.4–12.4)
POTASSIUM SERPL-SCNC: 3.8 MEQ/L (ref 3.5–5.2)
RBC # BLD: 3.04 MILL/MM3 (ref 4.7–6.1)
RETIC HEMOGLOBIN: 28.8 PG (ref 28.2–35.7)
RETICULOCYTE ABSOLUTE COUNT: 3.7 % (ref 0.5–2)
SODIUM BLD-SCNC: 134 MEQ/L (ref 135–145)
WBC # BLD: 5.8 THOU/MM3 (ref 4.8–10.8)

## 2022-03-21 PROCEDURE — 94760 N-INVAS EAR/PLS OXIMETRY 1: CPT

## 2022-03-21 PROCEDURE — 82948 REAGENT STRIP/BLOOD GLUCOSE: CPT

## 2022-03-21 PROCEDURE — 87641 MR-STAPH DNA AMP PROBE: CPT

## 2022-03-21 PROCEDURE — 6360000002 HC RX W HCPCS: Performed by: INTERNAL MEDICINE

## 2022-03-21 PROCEDURE — 6370000000 HC RX 637 (ALT 250 FOR IP): Performed by: STUDENT IN AN ORGANIZED HEALTH CARE EDUCATION/TRAINING PROGRAM

## 2022-03-21 PROCEDURE — 36415 COLL VENOUS BLD VENIPUNCTURE: CPT

## 2022-03-21 PROCEDURE — 99232 SBSQ HOSP IP/OBS MODERATE 35: CPT | Performed by: SURGERY

## 2022-03-21 PROCEDURE — 85046 RETICYTE/HGB CONCENTRATE: CPT

## 2022-03-21 PROCEDURE — APPSS30 APP SPLIT SHARED TIME 16-30 MINUTES: Performed by: NURSE PRACTITIONER

## 2022-03-21 PROCEDURE — 99233 SBSQ HOSP IP/OBS HIGH 50: CPT | Performed by: INTERNAL MEDICINE

## 2022-03-21 PROCEDURE — 6360000002 HC RX W HCPCS: Performed by: STUDENT IN AN ORGANIZED HEALTH CARE EDUCATION/TRAINING PROGRAM

## 2022-03-21 PROCEDURE — 85027 COMPLETE CBC AUTOMATED: CPT

## 2022-03-21 PROCEDURE — 94640 AIRWAY INHALATION TREATMENT: CPT

## 2022-03-21 PROCEDURE — 2580000003 HC RX 258: Performed by: STUDENT IN AN ORGANIZED HEALTH CARE EDUCATION/TRAINING PROGRAM

## 2022-03-21 PROCEDURE — 6370000000 HC RX 637 (ALT 250 FOR IP): Performed by: INTERNAL MEDICINE

## 2022-03-21 PROCEDURE — 1200000000 HC SEMI PRIVATE

## 2022-03-21 PROCEDURE — 80048 BASIC METABOLIC PNL TOTAL CA: CPT

## 2022-03-21 RX ORDER — FUROSEMIDE 10 MG/ML
40 INJECTION INTRAMUSCULAR; INTRAVENOUS 2 TIMES DAILY
Status: DISCONTINUED | OUTPATIENT
Start: 2022-03-21 | End: 2022-03-23

## 2022-03-21 RX ORDER — IPRATROPIUM BROMIDE AND ALBUTEROL SULFATE 2.5; .5 MG/3ML; MG/3ML
1 SOLUTION RESPIRATORY (INHALATION) EVERY 4 HOURS PRN
Qty: 360 ML | Refills: 1 | Status: SHIPPED | OUTPATIENT
Start: 2022-03-21

## 2022-03-21 RX ADMIN — CARVEDILOL 25 MG: 25 TABLET, FILM COATED ORAL at 09:16

## 2022-03-21 RX ADMIN — MONTELUKAST SODIUM 10 MG: 10 TABLET ORAL at 21:05

## 2022-03-21 RX ADMIN — HEPARIN SODIUM 5000 UNITS: 5000 INJECTION INTRAVENOUS; SUBCUTANEOUS at 21:08

## 2022-03-21 RX ADMIN — HYDRALAZINE HYDROCHLORIDE 25 MG: 25 TABLET, FILM COATED ORAL at 21:05

## 2022-03-21 RX ADMIN — ASPIRIN 81 MG: 81 TABLET, COATED ORAL at 09:16

## 2022-03-21 RX ADMIN — CARVEDILOL 25 MG: 25 TABLET, FILM COATED ORAL at 18:37

## 2022-03-21 RX ADMIN — LINEZOLID 600 MG: 600 INJECTION, SOLUTION INTRAVENOUS at 12:41

## 2022-03-21 RX ADMIN — SODIUM CHLORIDE, PRESERVATIVE FREE 10 ML: 5 INJECTION INTRAVENOUS at 09:16

## 2022-03-21 RX ADMIN — HEPARIN SODIUM 5000 UNITS: 5000 INJECTION INTRAVENOUS; SUBCUTANEOUS at 15:20

## 2022-03-21 RX ADMIN — HEPARIN SODIUM 5000 UNITS: 5000 INJECTION INTRAVENOUS; SUBCUTANEOUS at 05:05

## 2022-03-21 RX ADMIN — OXYBUTYNIN CHLORIDE 10 MG: 10 TABLET, EXTENDED RELEASE ORAL at 09:16

## 2022-03-21 RX ADMIN — HYDRALAZINE HYDROCHLORIDE 25 MG: 25 TABLET, FILM COATED ORAL at 09:16

## 2022-03-21 RX ADMIN — SODIUM CHLORIDE, PRESERVATIVE FREE 10 ML: 5 INJECTION INTRAVENOUS at 21:12

## 2022-03-21 RX ADMIN — BUDESONIDE AND FORMOTEROL FUMARATE DIHYDRATE 2 PUFF: 160; 4.5 AEROSOL RESPIRATORY (INHALATION) at 09:26

## 2022-03-21 RX ADMIN — ROPINIROLE HYDROCHLORIDE 1 MG: 1 TABLET, FILM COATED ORAL at 15:20

## 2022-03-21 RX ADMIN — FUROSEMIDE 40 MG: 10 INJECTION, SOLUTION INTRAMUSCULAR; INTRAVENOUS at 18:36

## 2022-03-21 RX ADMIN — FUROSEMIDE 40 MG: 10 INJECTION, SOLUTION INTRAMUSCULAR; INTRAVENOUS at 12:37

## 2022-03-21 RX ADMIN — IPRATROPIUM BROMIDE AND ALBUTEROL SULFATE 1 AMPULE: .5; 3 SOLUTION RESPIRATORY (INHALATION) at 21:41

## 2022-03-21 RX ADMIN — ROPINIROLE HYDROCHLORIDE 2 MG: 1 TABLET, FILM COATED ORAL at 21:05

## 2022-03-21 RX ADMIN — BUDESONIDE AND FORMOTEROL FUMARATE DIHYDRATE 2 PUFF: 160; 4.5 AEROSOL RESPIRATORY (INHALATION) at 21:41

## 2022-03-21 RX ADMIN — IPRATROPIUM BROMIDE AND ALBUTEROL SULFATE 1 AMPULE: .5; 3 SOLUTION RESPIRATORY (INHALATION) at 09:26

## 2022-03-21 RX ADMIN — HYDRALAZINE HYDROCHLORIDE 25 MG: 25 TABLET, FILM COATED ORAL at 15:20

## 2022-03-21 RX ADMIN — LEVOTHYROXINE SODIUM 175 MCG: 0.15 TABLET ORAL at 05:05

## 2022-03-21 RX ADMIN — OXYCODONE AND ACETAMINOPHEN 1 TABLET: 10; 325 TABLET ORAL at 21:07

## 2022-03-21 ASSESSMENT — PAIN SCALES - GENERAL
PAINLEVEL_OUTOF10: 0
PAINLEVEL_OUTOF10: 5
PAINLEVEL_OUTOF10: 3
PAINLEVEL_OUTOF10: 8

## 2022-03-21 NOTE — PLAN OF CARE
Problem: Pain:  Goal: Pain level will decrease  Description: Pain level will decrease  Outcome: Ongoing  Note: Patient complaining of chronic pain in back. Refusing any pain medications, has heating pad. Problem: Falls - Risk of:  Goal: Will remain free from falls  Description: Will remain free from falls  Outcome: Ongoing  Note: Remains free from falls this shift. Problem: Skin Integrity:  Goal: Absence of new skin breakdown  Description: Absence of new skin breakdown  Outcome: Ongoing  Note: No new skin breakdown noted this shift. Care plan reviewed with patient. Patient verbalize understanding of the plan of care and contribute to goal setting.

## 2022-03-21 NOTE — PROGRESS NOTES
General Surgery  Dr Nisreen Garcias  Daily Progress Note      Patient:  Marc Strickland      Unit/Bed:4K-16/016-A    YOB: 1960    MRN: [id number]     Acct: [de-identified]     Admit date: 3/16/2022    Subjective: patient sitting in chair, minimal purulent drainage noted. Visible mesh noted . Tolerating diet, denies N&V. Feeling better. Discussed surgical plans for Friday as scheduled. Updated primary RN. All other ROS negative except noted in HPI      Patient Seen, Chart, Consults notes, Labs, Radiology studies reviewed. Past, Family, Social History unchanged from admission. Diet:  ADULT DIET; Regular; 4 carb choices (60 gm/meal);  Low Sodium (2 gm)    Medications:  Scheduled Meds:   furosemide  40 mg IntraVENous BID    heparin (porcine)  5,000 Units SubCUTAneous 3 times per day    ipratropium-albuterol  1 ampule Inhalation BID    hydrALAZINE  25 mg Oral TID    lidocaine  1 patch TransDERmal Daily    [Held by provider] cefepime  2,000 mg IntraVENous Q12H    sodium chloride flush  5-40 mL IntraVENous 2 times per day    linezolid  600 mg IntraVENous Q12H    [Held by provider] amitriptyline  10 mg Oral Nightly    aspirin EC  81 mg Oral Daily    [Held by provider] doxepin  50 mg Oral Nightly    budesonide-formoterol  2 puff Inhalation BID    montelukast  10 mg Oral Nightly    levothyroxine  175 mcg Oral Daily    rOPINIRole  1 mg Oral Q24H    oxybutynin  10 mg Oral Daily    rOPINIRole  2 mg Oral Nightly    carvedilol  25 mg Oral BID     Continuous Infusions:   sodium chloride      dextrose       PRN Meds:ipratropium-albuterol, sodium chloride flush, sodium chloride, ondansetron **OR** ondansetron, polyethylene glycol, acetaminophen **OR** acetaminophen, potassium chloride **OR** potassium alternative oral replacement **OR** potassium chloride, albuterol sulfate HFA, oxyCODONE-acetaminophen, [Held by provider] cyclobenzaprine, glucagon (rDNA), dextrose, glucose, dextrose bolus (hypoglycemia)    Objective:    CBC:   Recent Labs     03/19/22  0432 03/21/22  0516   WBC 5.4 5.8   HGB 7.8* 7.8*    272     BMP:    Recent Labs     03/19/22  0432 03/20/22  0426 03/21/22  0516    135 134*   K 3.6 3.4* 3.8    99 97*   CO2 21* 21* 23   BUN 31* 38* 37*   CREATININE 2.4* 2.6* 2.6*   GLUCOSE 106 90 81     Calcium:  Recent Labs     03/21/22  0516   CALCIUM 8.6     Ionized Calcium:No results for input(s): IONCA in the last 72 hours. Magnesium:  Recent Labs     03/20/22  0426   MG 1.9     Phosphorus:  Recent Labs     03/19/22 0432   PHOS 4.4     BNP:No results for input(s): BNP in the last 72 hours. Glucose:  Recent Labs     03/20/22  1145 03/20/22  1644 03/20/22  2018   POCGLU 111* 99 136*     HgbA1C: No results for input(s): LABA1C in the last 72 hours. INR: No results for input(s): INR in the last 72 hours. Hepatic:   No results for input(s): ALKPHOS, ALT, AST, PROT, BILITOT, BILIDIR, LABALBU in the last 72 hours. Amylase and Lipase:No results for input(s): LACTA, AMYLASE in the last 72 hours. Lactic Acid: No results for input(s): LACTA in the last 72 hours. Troponin:   Recent Labs     03/18/22  2007   TROPONINT 0.011*     BNP: No results for input(s): BNP in the last 72 hours. Lipids: No results for input(s): CHOL, TRIG, HDL, LDL, LDLCALC in the last 72 hours. ABGs:   Lab Results   Component Value Date    PH 7.42 03/02/2021    PCO2 41 03/02/2021    PO2 69 03/02/2021    HCO3 27 03/02/2021    O2SAT 94 03/02/2021       Radiology reports as per the Radiologist  Radiology: CT CHEST WO CONTRAST    Result Date: 3/16/2022  PROCEDURE: CT CHEST WO CONTRAST CLINICAL INFORMATION: cough. COMPARISON: Chest x-ray obtained on the same day. CT scan dated 24th of May 2021. . TECHNIQUE: 5 mm noncontrast axial images were obtained through the chest. Sagittal and coronal reconstructions were obtained.  All CT scans at this facility use dose modulation, iterative reconstruction, and/or weight-based dosing when appropriate to reduce radiation dose to as low as reasonably achievable. FINDINGS: There is borderline cardiomegaly. There is calcification in the left anterior descending and right coronary arteries. . The aorta is of normal caliber. There is a 13 mm metallic density in the left lower lobe pulmonary artery. This appears new since previous CT pulmonary angiogram dated 24th of May 2021. Rahat Brittle There is small mediastinal lymph nodes present. There is no pericardial effusion. There is a small right pleural effusion There is thickening of the interstitial lung markings. There appears to be slightly increased fluid along the fissures. There is thoracic spondylosis. . Is a small hiatal hernia There is splenomegaly. 1. Borderline cardiomegaly. Calcification in the left anterior descending and right coronary arteries. 2. 13 mm metallic density in the left lower lobe pulmonary artery, new since previous CT scan of the chest dated 24th of May 2021. 3. Small right pleural effusion. 4. Small mediastinal lymph nodes. 5. Thickening off the interstitial lung markings. 6. Thoracic spondylosis. 7. Splenomegaly. 8. Small hiatal hernia. **This report has been created using voice recognition software. It may contain minor errors which are inherent in voice recognition technology. ** Final report electronically signed by DR Michael Collado on 3/16/2022 12:47 PM    NM LUNG VENT/PERFUSION (VQ)    Result Date: 3/16/2022  NM Lung Perfusion/ventilation Comparison: None Findings 3.10 mCi technetium 99m MAA used. 6.80 MCI xenon-133 gas used. Wash-in equilibrium and washout images show normal xenon-133 ventilation scan. The lungs are almost completely clear of xenon within 2 minutes. Perfusion lung images show no defects. Low probability of pulmonary embolus. Normal ventilation bilaterally with good washout and no areas of trapping.  This document has been electronically signed by: Marvin Jones MD on 03/16/2022 05:52 PM    XR CHEST PORTABLE    Result Date: 3/16/2022  PROCEDURE: XR CHEST PORTABLE CLINICAL INFORMATION: fever. COMPARISON: Chest x-ray dated third of March 2022. TECHNIQUE: AP upright view of the chest. FINDINGS: There is mild cardiomegaly. .The mediastinum is not widened. There are no pulmonary infiltrates or effusions. The pulmonary vascularity is increased. There is thoracic spondylosis. 1. Mild cardiomegaly and increased pulmonary vascularity. 2. Otherwise negative chest x-ray. . **This report has been created using voice recognition software. It may contain minor errors which are inherent in voice recognition technology. ** Final report electronically signed by DR Chiara Farmer on 3/16/2022 11:53 AM       Physical Exam:  Vitals: /71   Pulse 61   Temp 97.7 °F (36.5 °C) (Oral)   Resp 17   Ht 6' 1\" (1.854 m)   Wt (!) 335 lb (152 kg)   SpO2 96%   BMI 44.20 kg/m²   24 hour intake/output:    Intake/Output Summary (Last 24 hours) at 3/21/2022 1151  Last data filed at 3/21/2022 0511  Gross per 24 hour   Intake 860 ml   Output 1175 ml   Net -315 ml     Last 3 weights: Wt Readings from Last 3 Encounters:   03/21/22 (!) 335 lb (152 kg)   03/03/22 (!) 333 lb 6.4 oz (151.2 kg)   02/15/22 (!) 338 lb 9.6 oz (153.6 kg)       General appearance - oriented to person, place, and time and overweight  HEENT: Normocephalic and Atraumatic  Chest - clear to auscultation, no wheezes, rales or rhonchi, symmetric air entry  Cardiovascular - normal rate and regular rhythm  Abdomen - BS active.  Soft, round  Wound: mesh that is exposed, purulent drainage seems to be decreasing per patient  Neurological - Alert and oriented and Normal speech  Integumentary - Skin color, texture, turgor normal. No Rashes or lesions  Musculoskeletal -Full ROM times 4 extremities      DVT prophylaxis: [] Lovenox                                 [] SCDs                                 [x] SQ Heparin                                 [] Encourage

## 2022-03-21 NOTE — PROGRESS NOTES
Hospitalist Progress Note    Patient: Canary Kehr  Unit/Bed: 4K-16/016-A  YOB: 1960  MRN: [id number]  Acct: [de-identified]    PCP: Silvino Luna DO    Date of Admission: 3/16/2022    Assessment/Plan:    Sepsis secondary to abdominal wound - present on arrival. SIRS 2/4 (Tachycardia and tachypnea). Abdominal wound is from dehiscence of hernia repair in 2018 with surgical mesh exposure, now with purulent discharge. Patient received 1 dose Ceftriaxone within the ED. 30cc/kg fluid bolus completed. CT abdomen without abscess. - Blood Cx NGTD  - Wound Cx MRSA and corynebacterium species  - Continue Linezolid and Cefepime; follow Cx for de-escalation  - Surgery consulted and will re-evaluate on 3/21/22 for surgical intervention.     Stage 1 CAROLE on CKD 3a with renal progression to 3b - No significant change. Renal progression appears to have occurred around 2/2022 and likely medication related with adjustments to home Bumex regimen. This may be his new baseline. Patient received 80mg Lasix within ED on arrival.  - Avoid nephrotoxic agents and renally dose medications  - Daily standing weights, strict I/O  - Daily BMP     Elevated microalbumin/Creatinine ratio - 1060mg/g. Likely secondary to hypertension and diabetes. Patient follows with Dr. Cecilia Govea.  - Nephrology following    Elevated troponin - Secondary to type II demand ischemia. Patient denies chest pain, EKG without ST or T-wave abnormalities. - No indication to trend     Chronic systolic heart failure NYHA II - Without exacerbation. EF=55-60% on ECHO 5/25/21. Implanted CardioMEMS 6/16/2021. [address] 12/1/2020 demonstrating elevated LVEDP. Patient follows with CHF clinic.  - Patient not currently taking Entresto, spironolactone, carvedilol or Bumex per chart review and recommendations of outside provider per patient  - Plan for CardioMEMS download today (3/21/22) assess fluid status  - Cardiology consulted     COPD GOLD 2 - Without exacerbation.  PFT 3/15/2021 FEV1= 75% of predicted. - Continue home Montelukast, Symbicort  - Duonebs twice daily  - Continue Acapella and IS     NIDDM2 - Uncontrolled. A1c=9.9 (5/24/21)  - high dose ssi, POCT glucose 4x daily AC/HS, Hypoglycemia protocol  - Holding gabapentin for CAROLE  - Resume Elavil for neuropathic pain  - Inpatient BG goal 140-180; tight glucose control will be imperative in the laci and post-operative stated for adequate healing.     Primary HTN - Pressures are on the low/normal side. . Patient is not taking home hydralazine per recommendations of outside provider.  - Continue hydralazine to 25mg three times daily    LAURIE - CPAP at home  - Continue home CPAP     BPH - Noted  - Holding Tamsulosin for CAROLE     Hx of hypothyroidism - Noted  - Continue Synthroid     Hx of chronic back pain - Noted  - Resume home percocet  - Hold Flexeril for category X interaction with Linezolid    High anion gap metabolic acidosis - Resolved. Secondary to sepsis and elevated lactic acid. ?Pulmonary embolism - Resolved. Pulmonary embolism was considered within the ED, but CTA not obtainable due to worsening renal function. V/Q scan demonstrated low probability of pulmonary embolism. Moderate Hypokalemia - Resolved. Secondary to diuretic use. Patient also received 80mg Lasix within the ED. Magnesium and phosphorus within normal limits  - Continue potassium replacement as needed  - Continuous telemetry    Acute hypoxic respiratory failure - Resolved. Weaned to room air. Likely secondary to fluid overload. Negative viral respiratory panel 3/19/22.  - Wean supplemental oxygen as tolerated to maintain SpO2>92%.   - CardioMEMS download would be helpful; would appreciate cardiology to assist  - Hold maintenance fluids and limit volume if possible  - Continue Duoneb twice daily  - Hold off on diuretics      Expected discharge date:  TBD    Disposition:    [x] Home       [] TCU       [] Rehab       [] Psych       [] SNF       [] Long Term Care Facility       [] Other -    Chief Complaint: Fever    Hospital Course:  Canary Kehr is a 63 y.o. male with PMHx of HTN, CHF, BPH, LAURIE, hypothyroidism, chronic back pain, CKD, and NIDDM2 who presented to [facility] with complaint of fever that began approximately 5 days prior to presentation that was self-reported as high as 101F. The patient had associated symptoms of diaphoresis. He noted that he also has increased cough and shortness of breath from his baseline during these complaints. He has been taking Ibuprofen for his fever and OTC cough medicine for his cough which he states helps. The patient reported that his shortness of breath as well as his cough have been occurring for the last 4 months and have not worsened over that time period. He also stated that he is recently been instructed to hold his Bumex as well as his Entresto from his nephrologist and CHF clinic respectively. He denies headache, lightheadedness, change in vision, rhinorrhea, congestion, sore throat, cough, hemoptysis, chest pain, palpitations, abdominal pain, nausea, vomiting, hematemesis, change in bowel/bladder habits, hematochezia, hematuria, weakness, difficulty with ambulation, numbness/tingling, or known exposure to sick contacts. He admits to fever, chills, AKINS, PND, and shortness of breath. Within the emergency department the patient was administered Lasix 80mg, ASA 324mg, 1 dose of Ceftriaxone 1g, and 4mg morphine, He also underwent CT of the chest which demonstrated borderline cardiomegaly, calcification of the LAD and RCA coronary arteries, small right pleural effusion and splenomegaly. The patient was admitted to the hospital service for further care and management. Orders were placed for general surgery consult. Subjective (past 24 hours): Per nursing, no acute events overnight. The patient stated that he is feeling well and actually somewhat improved.  He has been using his incentive spirometer and acapella multiple times per day and is motivated to improve his health. He stated that general surgery will take him for abdominal surgery this coming Friday 3/25/22. He has no additional question or concerns. Review of Systems: 12 point review of systems completed and pertinent positives are noted in the HPI. All other systems reviewed and negative. Medications:  Reviewed    Infusion Medications    sodium chloride      dextrose       Scheduled Medications    heparin (porcine)  5,000 Units SubCUTAneous 3 times per day    ipratropium-albuterol  1 ampule Inhalation BID    hydrALAZINE  25 mg Oral TID    lidocaine  1 patch TransDERmal Daily    insulin lispro  0-6 Units SubCUTAneous TID WC    insulin lispro  0-3 Units SubCUTAneous Nightly    [Held by provider] cefepime  2,000 mg IntraVENous Q12H    sodium chloride flush  5-40 mL IntraVENous 2 times per day    linezolid  600 mg IntraVENous Q12H    [Held by provider] amitriptyline  10 mg Oral Nightly    aspirin EC  81 mg Oral Daily    [Held by provider] doxepin  50 mg Oral Nightly    budesonide-formoterol  2 puff Inhalation BID    montelukast  10 mg Oral Nightly    levothyroxine  175 mcg Oral Daily    rOPINIRole  1 mg Oral Q24H    oxybutynin  10 mg Oral Daily    rOPINIRole  2 mg Oral Nightly    carvedilol  25 mg Oral BID     PRN Meds: ipratropium-albuterol, sodium chloride flush, sodium chloride, ondansetron **OR** ondansetron, polyethylene glycol, acetaminophen **OR** acetaminophen, potassium chloride **OR** potassium alternative oral replacement **OR** potassium chloride, albuterol sulfate HFA, oxyCODONE-acetaminophen, [Held by provider] cyclobenzaprine, glucagon (rDNA), dextrose, glucose, dextrose bolus (hypoglycemia)      Intake/Output Summary (Last 24 hours) at 3/21/2022 0802  Last data filed at 3/21/2022 0511  Gross per 24 hour   Intake 860 ml   Output 1475 ml   Net -615 ml       Diet:  ADULT DIET;  Regular; 4 carb choices (60 gm/meal); Low Sodium (2 gm)    Exam:  /61   Pulse 63   Temp 97.5 °F (36.4 °C) (Axillary)   Resp 21   Ht 6' 1\" (1.854 m)   Wt (!) 335 lb (152 kg)   SpO2 92%   BMI 44.20 kg/m²     Physical Exam  Vitals and nursing note reviewed. Constitutional:       General: He is awake. Appearance: Normal appearance. He is morbidly obese. Interventions: Nasal cannula in place. HENT:      Head: Normocephalic and atraumatic. Right Ear: External ear normal.      Left Ear: External ear normal.      Nose: Nose normal.      Mouth/Throat:      Mouth: Mucous membranes are moist.      Pharynx: Oropharynx is clear. Eyes:      Conjunctiva/sclera: Conjunctivae normal.      Pupils: Pupils are equal, round, and reactive to light. Cardiovascular:      Rate and Rhythm: Normal rate and regular rhythm. Pulses: Normal pulses. Dorsalis pedis pulses are 2+ on the right side and 2+ on the left side. Posterior tibial pulses are 2+ on the right side and 2+ on the left side. Heart sounds: Normal heart sounds. Pulmonary:      Effort: Pulmonary effort is normal.      Breath sounds: Normal breath sounds. Decreased air movement present. No wheezing. Abdominal:      General: A surgical scar is present. Bowel sounds are normal.      Palpations: Abdomen is soft. Tenderness: There is no abdominal tenderness. Musculoskeletal:         General: Normal range of motion. Cervical back: Normal range of motion and neck supple. Right lower leg: No edema. Left lower leg: No edema. Skin:     General: Skin is warm and dry. Capillary Refill: Capillary refill takes less than 2 seconds. Neurological:      General: No focal deficit present. Mental Status: He is alert and oriented to person, place, and time. Mental status is at baseline. GCS: GCS eye subscore is 4. GCS verbal subscore is 5. GCS motor subscore is 6.    Psychiatric:         Attention and Perception: Attention and perception normal.         Mood and Affect: Mood and affect normal.         Speech: Speech normal.         Behavior: Behavior normal. Behavior is cooperative. Thought Content: Thought content normal.         Cognition and Memory: Cognition and memory normal.         Judgment: Judgment normal.         Labs:  Recent Labs     03/19/22 0432 03/21/22  0516   WBC 5.4 5.8   HGB 7.8* 7.8*   HCT 25.8* 25.8*    272     Recent Labs     03/19/22  0432 03/20/22  0426 03/21/22  0516    135 134*   K 3.6 3.4* 3.8    99 97*   CO2 21* 21* 23   BUN 31* 38* 37*   CREATININE 2.4* 2.6* 2.6*   CALCIUM 8.5 8.5 8.6   PHOS 4.4  --   --      No results for input(s): AST, ALT, BILIDIR, BILITOT, ALKPHOS in the last 72 hours. No results for input(s): INR in the last 72 hours. No results for input(s): [place] in the last 72 hours. Microbiology:    Urinalysis:   Lab Results   Component Value Date    NITRU NEGATIVE 03/16/2022    WBCUA 15-25 03/16/2022    BACTERIA NONE SEEN 03/16/2022    RBCUA > 100 03/16/2022    BLOODU LARGE 03/16/2022    SPECGRAV 1.011 03/16/2022    GLUCOSEU 500 04/13/2021       Radiology:  CT ABDOMEN PELVIS WO CONTRAST Additional Contrast? None   Final Result   Gas and fluid external to the abdomen and near the umbilicus along the patient's hernia mesh level. No evidence of intra-abdominal abscess. No acute abdominal or pelvic abnormalities. Multiple chronic findings. Stable appearance of the lung bases. **This report has been created using voice recognition software. It may contain minor errors which are inherent in voice recognition technology. **      Final report electronically signed by Dr. Marcelo Garnica on 3/19/2022 6:32 PM      XR CHEST PORTABLE   Final Result   Impression:   Cardiomegaly, no CHF. Small right pleural effusion.       This document has been electronically signed by: Virginia Desai MD on    03/18/2022 07:27 PM      US RENAL COMPLETE   Final Result       1. Bilateral renal cysts. 2. No definite evidence of hydronephrosis on either side. 3. The bladder was not visualized. .               **This report has been created using voice recognition software. It may contain minor errors which are inherent in voice recognition technology. **      Final report electronically signed by DR Allie Mora on 3/18/2022 11:30 AM      NM LUNG VENT/PERFUSION (VQ)   Final Result   Low probability of pulmonary embolus. Normal ventilation bilaterally with good washout and no areas of trapping. This document has been electronically signed by: Ash Avalos MD on    03/16/2022 05:52 PM      CT CHEST WO CONTRAST   Final Result       1. Borderline cardiomegaly. Calcification in the left anterior descending and right coronary arteries. 2. 13 mm metallic density in the left lower lobe pulmonary artery, new since previous CT scan of the chest dated 24th of May 2021. 3. Small right pleural effusion. 4. Small mediastinal lymph nodes. 5. Thickening off the interstitial lung markings. 6. Thoracic spondylosis. 7. Splenomegaly. 8. Small hiatal hernia. **This report has been created using voice recognition software. It may contain minor errors which are inherent in voice recognition technology. **      Final report electronically signed by DR Allie Mora on 3/16/2022 12:47 PM      XR CHEST PORTABLE   Final Result   1. Mild cardiomegaly and increased pulmonary vascularity. 2. Otherwise negative chest x-ray. .               **This report has been created using voice recognition software. It may contain minor errors which are inherent in voice recognition technology. **      Final report electronically signed by DR Allie Mora on 3/16/2022 11:53 AM          DVT prophylaxis:  [x] Lovenox  [] SCDs  [] SQ Heparin  [] Encourage ambulation  [] Already on anticoagulation  [] Other -      Code Status: Full Code    PT/OT Evaluation Status: N/A    Telemetry:  [x] Yes  [] No    Electronically signed by Tony Lowery DO, MBA on 3/21/2022 at 8:02 AM

## 2022-03-21 NOTE — PROGRESS NOTES
CREATININE 2.4* 2.6* 2.6*   GLUCOSE 106 90 81   CALCIUM 8.5 8.5 8.6   MG 1.8 1.9  --    PHOS 4.4  --   --      Hepatic: No results for input(s): LABALBU, AST, ALT, ALB, BILITOT, ALKPHOS in the last 72 hours. Meds:  Infusion:    sodium chloride      dextrose       Meds:    heparin (porcine)  5,000 Units SubCUTAneous 3 times per day    ipratropium-albuterol  1 ampule Inhalation BID    hydrALAZINE  25 mg Oral TID    lidocaine  1 patch TransDERmal Daily    insulin lispro  0-6 Units SubCUTAneous TID WC    insulin lispro  0-3 Units SubCUTAneous Nightly    [Held by provider] cefepime  2,000 mg IntraVENous Q12H    sodium chloride flush  5-40 mL IntraVENous 2 times per day    linezolid  600 mg IntraVENous Q12H    [Held by provider] amitriptyline  10 mg Oral Nightly    aspirin EC  81 mg Oral Daily    [Held by provider] doxepin  50 mg Oral Nightly    budesonide-formoterol  2 puff Inhalation BID    montelukast  10 mg Oral Nightly    levothyroxine  175 mcg Oral Daily    rOPINIRole  1 mg Oral Q24H    oxybutynin  10 mg Oral Daily    rOPINIRole  2 mg Oral Nightly    carvedilol  25 mg Oral BID     Meds prn: ipratropium-albuterol, sodium chloride flush, sodium chloride, ondansetron **OR** ondansetron, polyethylene glycol, acetaminophen **OR** acetaminophen, potassium chloride **OR** potassium alternative oral replacement **OR** potassium chloride, albuterol sulfate HFA, oxyCODONE-acetaminophen, [Held by provider] cyclobenzaprine, glucagon (rDNA), dextrose, glucose, dextrose bolus (hypoglycemia)       Impression and Plan:  1. CAROLE on CKD 3A.   Serum creatinine staying around 2.2 to 2.3  Baseline ~ 1.3  Multiple risk factors for CKD including chronic diastolic heart failure/cardiorenal, volume depletion from diarrhea  Likely has sustained CAROLE from ATN but cannot rule out progressive CKD due to underlying diabetic nephropathy and also due to post adaptive FSGS from morbid obesity  Continue to monitor  Discussed with patient  Uncertain if patient will have significant improvement  However at this time priority is to optimize his fluid status  Clinically fluid overloaded  We will give Lasix IV 40 mg twice daily    2. Volume overload. Start IV Lasix 40 mg twice daily  3. Proteinuria secondary to diabetic nephropathy +/-secondary FSGS (post adaptive from obesity)  4. Hypokalemia. Improved  5. Abdominal wound. Being followed by surgery. For possible surgical intervention  6. Chronic diastolic dysfunction with volume overload. Plan as above  7. Chronically uncontrolled diabetes mellitus  8. Essential hypertension. Blood pressure readings noted. Continue with Coreg and hydralazine.   Stable    D/W patient     Magnolia Johnson MD  [facility]

## 2022-03-22 ENCOUNTER — TELEPHONE (OUTPATIENT)
Dept: WOUND CARE | Age: 62
End: 2022-03-22

## 2022-03-22 LAB
AEROBIC CULTURE: ABNORMAL
AEROBIC CULTURE: ABNORMAL
ANAEROBIC CULTURE: ABNORMAL
ANION GAP SERPL CALCULATED.3IONS-SCNC: 12 MEQ/L (ref 8–16)
BLOOD CULTURE, ROUTINE: NORMAL
BLOOD CULTURE, ROUTINE: NORMAL
BUN BLDV-MCNC: 38 MG/DL (ref 7–22)
CALCIUM SERPL-MCNC: 8.5 MG/DL (ref 8.5–10.5)
CHLORIDE BLD-SCNC: 101 MEQ/L (ref 98–111)
CO2: 23 MEQ/L (ref 23–33)
CREAT SERPL-MCNC: 2.6 MG/DL (ref 0.4–1.2)
FERRITIN: 423 NG/ML (ref 22–322)
GFR SERPL CREATININE-BSD FRML MDRD: 25 ML/MIN/1.73M2
GLUCOSE BLD-MCNC: 86 MG/DL (ref 70–108)
GRAM STAIN RESULT: ABNORMAL
IRON SATURATION: 20 % (ref 20–50)
IRON: 40 UG/DL (ref 65–195)
ORGANISM: ABNORMAL
POTASSIUM REFLEX MAGNESIUM: 3.6 MEQ/L (ref 3.5–5.2)
POTASSIUM SERPL-SCNC: 3.3 MEQ/L (ref 3.5–5.2)
SODIUM BLD-SCNC: 136 MEQ/L (ref 135–145)
TOTAL IRON BINDING CAPACITY: 203 UG/DL (ref 171–450)

## 2022-03-22 PROCEDURE — 83550 IRON BINDING TEST: CPT

## 2022-03-22 PROCEDURE — 94640 AIRWAY INHALATION TREATMENT: CPT

## 2022-03-22 PROCEDURE — 6360000002 HC RX W HCPCS: Performed by: INTERNAL MEDICINE

## 2022-03-22 PROCEDURE — 6360000002 HC RX W HCPCS: Performed by: STUDENT IN AN ORGANIZED HEALTH CARE EDUCATION/TRAINING PROGRAM

## 2022-03-22 PROCEDURE — 6370000000 HC RX 637 (ALT 250 FOR IP): Performed by: STUDENT IN AN ORGANIZED HEALTH CARE EDUCATION/TRAINING PROGRAM

## 2022-03-22 PROCEDURE — 2500000003 HC RX 250 WO HCPCS: Performed by: STUDENT IN AN ORGANIZED HEALTH CARE EDUCATION/TRAINING PROGRAM

## 2022-03-22 PROCEDURE — 36415 COLL VENOUS BLD VENIPUNCTURE: CPT

## 2022-03-22 PROCEDURE — 99232 SBSQ HOSP IP/OBS MODERATE 35: CPT | Performed by: INTERNAL MEDICINE

## 2022-03-22 PROCEDURE — 2580000003 HC RX 258: Performed by: STUDENT IN AN ORGANIZED HEALTH CARE EDUCATION/TRAINING PROGRAM

## 2022-03-22 PROCEDURE — 82728 ASSAY OF FERRITIN: CPT

## 2022-03-22 PROCEDURE — 84132 ASSAY OF SERUM POTASSIUM: CPT

## 2022-03-22 PROCEDURE — 83540 ASSAY OF IRON: CPT

## 2022-03-22 PROCEDURE — 1200000000 HC SEMI PRIVATE

## 2022-03-22 PROCEDURE — 99233 SBSQ HOSP IP/OBS HIGH 50: CPT | Performed by: INTERNAL MEDICINE

## 2022-03-22 PROCEDURE — 94760 N-INVAS EAR/PLS OXIMETRY 1: CPT

## 2022-03-22 PROCEDURE — 6370000000 HC RX 637 (ALT 250 FOR IP): Performed by: INTERNAL MEDICINE

## 2022-03-22 PROCEDURE — 80048 BASIC METABOLIC PNL TOTAL CA: CPT

## 2022-03-22 RX ADMIN — HEPARIN SODIUM 5000 UNITS: 5000 INJECTION INTRAVENOUS; SUBCUTANEOUS at 14:12

## 2022-03-22 RX ADMIN — LINEZOLID 600 MG: 600 INJECTION, SOLUTION INTRAVENOUS at 00:38

## 2022-03-22 RX ADMIN — FUROSEMIDE 40 MG: 10 INJECTION, SOLUTION INTRAMUSCULAR; INTRAVENOUS at 17:04

## 2022-03-22 RX ADMIN — OXYBUTYNIN CHLORIDE 10 MG: 10 TABLET, EXTENDED RELEASE ORAL at 08:11

## 2022-03-22 RX ADMIN — OXYCODONE AND ACETAMINOPHEN 1 TABLET: 10; 325 TABLET ORAL at 19:40

## 2022-03-22 RX ADMIN — POTASSIUM CHLORIDE 40 MEQ: 1500 TABLET, EXTENDED RELEASE ORAL at 08:03

## 2022-03-22 RX ADMIN — CEFAZOLIN 2000 MG: 10 INJECTION, POWDER, FOR SOLUTION INTRAVENOUS at 11:39

## 2022-03-22 RX ADMIN — IPRATROPIUM BROMIDE AND ALBUTEROL SULFATE 1 AMPULE: .5; 3 SOLUTION RESPIRATORY (INHALATION) at 09:03

## 2022-03-22 RX ADMIN — HEPARIN SODIUM 5000 UNITS: 5000 INJECTION INTRAVENOUS; SUBCUTANEOUS at 05:10

## 2022-03-22 RX ADMIN — CEFAZOLIN 2000 MG: 10 INJECTION, POWDER, FOR SOLUTION INTRAVENOUS at 19:45

## 2022-03-22 RX ADMIN — HYDRALAZINE HYDROCHLORIDE 25 MG: 25 TABLET, FILM COATED ORAL at 08:11

## 2022-03-22 RX ADMIN — CARVEDILOL 25 MG: 25 TABLET, FILM COATED ORAL at 08:11

## 2022-03-22 RX ADMIN — ONDANSETRON 4 MG: 4 TABLET, ORALLY DISINTEGRATING ORAL at 11:54

## 2022-03-22 RX ADMIN — BUDESONIDE AND FORMOTEROL FUMARATE DIHYDRATE 2 PUFF: 160; 4.5 AEROSOL RESPIRATORY (INHALATION) at 20:10

## 2022-03-22 RX ADMIN — BUDESONIDE AND FORMOTEROL FUMARATE DIHYDRATE 2 PUFF: 160; 4.5 AEROSOL RESPIRATORY (INHALATION) at 09:03

## 2022-03-22 RX ADMIN — HYDRALAZINE HYDROCHLORIDE 25 MG: 25 TABLET, FILM COATED ORAL at 14:53

## 2022-03-22 RX ADMIN — ROPINIROLE HYDROCHLORIDE 2 MG: 1 TABLET, FILM COATED ORAL at 19:40

## 2022-03-22 RX ADMIN — ROPINIROLE HYDROCHLORIDE 1 MG: 1 TABLET, FILM COATED ORAL at 14:12

## 2022-03-22 RX ADMIN — MONTELUKAST SODIUM 10 MG: 10 TABLET ORAL at 19:40

## 2022-03-22 RX ADMIN — HYDRALAZINE HYDROCHLORIDE 25 MG: 25 TABLET, FILM COATED ORAL at 19:40

## 2022-03-22 RX ADMIN — OXYCODONE AND ACETAMINOPHEN 1 TABLET: 10; 325 TABLET ORAL at 11:39

## 2022-03-22 RX ADMIN — HEPARIN SODIUM 5000 UNITS: 5000 INJECTION INTRAVENOUS; SUBCUTANEOUS at 22:36

## 2022-03-22 RX ADMIN — IPRATROPIUM BROMIDE AND ALBUTEROL SULFATE 1 AMPULE: .5; 3 SOLUTION RESPIRATORY (INHALATION) at 20:09

## 2022-03-22 RX ADMIN — CARVEDILOL 25 MG: 25 TABLET, FILM COATED ORAL at 17:08

## 2022-03-22 RX ADMIN — SODIUM CHLORIDE, PRESERVATIVE FREE 10 ML: 5 INJECTION INTRAVENOUS at 19:43

## 2022-03-22 RX ADMIN — FUROSEMIDE 40 MG: 10 INJECTION, SOLUTION INTRAMUSCULAR; INTRAVENOUS at 09:57

## 2022-03-22 RX ADMIN — LEVOTHYROXINE SODIUM 175 MCG: 0.15 TABLET ORAL at 06:39

## 2022-03-22 RX ADMIN — SODIUM CHLORIDE, PRESERVATIVE FREE 10 ML: 5 INJECTION INTRAVENOUS at 08:11

## 2022-03-22 RX ADMIN — ASPIRIN 81 MG: 81 TABLET, COATED ORAL at 08:02

## 2022-03-22 RX ADMIN — HYDROMORPHONE HYDROCHLORIDE 0.5 MG: 1 INJECTION, SOLUTION INTRAMUSCULAR; INTRAVENOUS; SUBCUTANEOUS at 23:28

## 2022-03-22 ASSESSMENT — PAIN SCALES - GENERAL
PAINLEVEL_OUTOF10: 9
PAINLEVEL_OUTOF10: 8
PAINLEVEL_OUTOF10: 10
PAINLEVEL_OUTOF10: 8
PAINLEVEL_OUTOF10: 7
PAINLEVEL_OUTOF10: 8
PAINLEVEL_OUTOF10: 9

## 2022-03-22 ASSESSMENT — PAIN DESCRIPTION - LOCATION
LOCATION: BACK
LOCATION: BACK

## 2022-03-22 ASSESSMENT — PAIN DESCRIPTION - FREQUENCY: FREQUENCY: CONTINUOUS

## 2022-03-22 ASSESSMENT — PAIN DESCRIPTION - PAIN TYPE
TYPE: CHRONIC PAIN
TYPE: CHRONIC PAIN

## 2022-03-22 ASSESSMENT — PAIN DESCRIPTION - PROGRESSION: CLINICAL_PROGRESSION: NOT CHANGED

## 2022-03-22 ASSESSMENT — PAIN DESCRIPTION - DESCRIPTORS: DESCRIPTORS: ACHING

## 2022-03-22 ASSESSMENT — PAIN DESCRIPTION - ORIENTATION: ORIENTATION: LOWER

## 2022-03-22 ASSESSMENT — PAIN - FUNCTIONAL ASSESSMENT: PAIN_FUNCTIONAL_ASSESSMENT: ACTIVITIES ARE NOT PREVENTED

## 2022-03-22 ASSESSMENT — PAIN DESCRIPTION - ONSET: ONSET: ON-GOING

## 2022-03-22 NOTE — CARE COORDINATION
3/22/22, 7:30 AM EDT    DISCHARGE ON GOING Gayathri 86 day: 6  Location: -24/024-A Reason for admit: CAROLE (acute kidney injury) [facility]) [N17.9]     Barriers to Discharge:   CAROLE/Dehisced Abdominal Wound Sepsis/Chronic Mesh Infection (2/6/8 SBO w Colon Resection/JENNY).    Abdominal Culture: Staph, Creatinine 2.6; monitor. Urine Output = 2050 ml/24h; monitor     Cardiology following for CHF. Nephrology following for CAROLE. SGY plans 3/25 for removal infected mesh;  IV AB, IV Diuresing continued.  Client transfer to ; handoff to [facility] Night, 5K CM    PCP: Hood Ramirez DO  Readmission Risk Score: 19.2 ( )%  Patient Goals/Plan/Treatment Preferences:   plans home w spouse Micheal independently as PTA when medically cleared; current w CHF/Wound Clinics; has BIPAP w 1L Oxygen bleed in at night; monitor AB plans

## 2022-03-22 NOTE — PROGRESS NOTES
Pt transferred to  [phone] via wheelchair from . Complaints: abdominal pain r/t wound. IV site free of s/s of infection or infiltration. Vital signs obtained. Assessment and data collection initiated. Two nurse skin assessment performed by Berenice Crawford RN and Cassius Edmond. Oriented to room. Policies and procedures for  explained. All questions answered with no further questions at this time. Fall prevention and safety brochure discussed with patient. Bed alarm on. Call light in reach. Oriented to room. Britany Jonas, RN, RN 3/21/2022 9:30 PM     Explained patients right to have family, representative or physician notified of their admission. Patient has Declined for physician to be notified. Patient has Declined for family/representative to be notified.

## 2022-03-22 NOTE — PLAN OF CARE
Problem: Pain:  Goal: Pain level will decrease  Description: Pain level will decrease  3/22/2022 1358 by Jose Ambrosio  Outcome: Ongoing  Note: Pt complains of low back pain, heating pain used and percocet given. Pain 8 out of 10 after med pain 6-7 out of 10. Problem: Skin Integrity:  Goal: Absence of new skin breakdown  Description: Absence of new skin breakdown  3/22/2022 1358 by Jose Ambrosio  Outcome: Ongoing  Note: Cleanse abd wound site. New dressing applied. No new redness noted. Problem: Falls - Risk of:  Goal: Will remain free from falls  Description: Will remain free from falls  3/22/2022 1358 by Jose Ambrosio  Outcome: Ongoing  Note: No falls this shift. Call light in reach. Gripper socks on and cane used when ambulating. Problem: Discharge Planning:  Goal: Discharged to appropriate level of care  Description: Discharged to appropriate level of care  3/22/2022 1358 by Jose Ambrosio  Outcome: Ongoing  Note: Pt planning on going home with wife.

## 2022-03-22 NOTE — TELEPHONE ENCOUNTER
Patient cancelled his appointment 3/23/22 due to him being in the hospital. Patient will call to reschedule once he is discharged.

## 2022-03-22 NOTE — RT PROTOCOL NOTE
RT Inhaler-Nebulizer Bronchodilator Protocol Note    There is a bronchodilator order in the chart from a provider indicating to follow the RT Bronchodilator Protocol and there is an Initiate RT Inhaler-Nebulizer Bronchodilator Protocol order as well (see protocol at bottom of note). CXR Findings:  No results found. The findings from the last RT Protocol Assessment were as follows:   History Pulmonary Disease: None or smoker <15 pack years  Respiratory Pattern: Dyspnea on exertion or RR 21-25 bpm  Breath Sounds: Slightly diminished and/or crackles  Cough: Strong, spontaneous, non-productive  Indication for Bronchodilator Therapy: Decreased or absent breath sounds,On home bronchodilators  Bronchodilator Assessment Score: 4    Aerosolized bronchodilator medication orders have been revised according to the RT Inhaler-Nebulizer Bronchodilator Protocol below. Respiratory Therapist to perform RT Therapy Protocol Assessment initially then follow the protocol. Repeat RT Therapy Protocol Assessment PRN for score 0-3 or on second treatment, BID, and PRN for scores above 3. No Indications - adjust the frequency to every 6 hours PRN wheezing or bronchospasm, if no treatments needed after 48 hours then discontinue using Per Protocol order mode. If indication present, adjust the RT bronchodilator orders based on the Bronchodilator Assessment Score as indicated below. Use Inhaler orders unless patient has one or more of the following: on home nebulizer, not able to hold breath for 10 seconds, is not alert and oriented, cannot activate and use MDI correctly, or respiratory rate 25 breaths per minute or more, then use the equivalent nebulizer order(s) with same Frequency and PRN reasons based on the score. If a patient is on this medication at home then do not decrease Frequency below that used at home.     0-3 - enter or revise RT bronchodilator order(s) to equivalent RT Bronchodilator order with Frequency of every 4 hours PRN for wheezing or increased work of breathing using Per Protocol order mode. 4-6 - enter or revise RT Bronchodilator order(s) to two equivalent RT bronchodilator orders with one order with BID Frequency and one order with Frequency of every 4 hours PRN wheezing or increased work of breathing using Per Protocol order mode. 7-10 - enter or revise RT Bronchodilator order(s) to two equivalent RT bronchodilator orders with one order with TID Frequency and one order with Frequency of every 4 hours PRN wheezing or increased work of breathing using Per Protocol order mode. 11-13 - enter or revise RT Bronchodilator order(s) to one equivalent RT bronchodilator order with QID Frequency and an Albuterol order with Frequency of every 4 hours PRN wheezing or increased work of breathing using Per Protocol order mode. Greater than 13 - enter or revise RT Bronchodilator order(s) to one equivalent RT bronchodilator order with every 4 hours Frequency and an Albuterol order with Frequency of every 2 hours PRN wheezing or increased work of breathing using Per Protocol order mode. RT to enter RT Home Evaluation for COPD & MDI Assessment order using Per Protocol order mode.     Electronically signed by Maria Isabel Hogue RCP on 3/21/2022 at 9:51 PM

## 2022-03-22 NOTE — PLAN OF CARE
Problem: Falls - Risk of:  Goal: Will remain free from falls  Description: Will remain free from falls  3/22/2022 0317 by Darell Beach RN  Outcome: Ongoing  Note: Fall precautions in place. Bed in lowest position. Call light within reach. Bed/chair alarm on. Problem: Impaired respiratory status  Goal: Clear lung sounds  Description: Clear lung sounds  3/22/2022 0317 by Connie Floyd RN  Outcome: Ongoing     Note:  Patient lung sounds are considered normal for their current lung condition. No signs of distress noted. Duonebs BID administered by respiratory. Problem: Pain:  Goal: Pain level will decrease  Description: Pain level will decrease  3/22/2022 0317 by Darell Beach RN  Outcome: Ongoing  Note: Patient reporting pain 6/10 with a goal of 3/10. Patient satisfied with current interventions including rest and repositioning. Pain assessment ongoing. Problem: Skin Integrity:  Goal: Absence of new skin breakdown  Description: Absence of new skin breakdown  3/22/2022 0317 by Darell Beach RN  Outcome: Ongoing  Note: No new skin breakdown. Pt ambulates frequently. Wound care qshift and PRN. Will continue on going skin assessment. Problem: Discharge Planning:  Goal: Discharged to appropriate level of care  Description: Discharged to appropriate level of care  Outcome: Ongoing  Note: Discharge plans to home with family discussed with patient. Care plan reviewed with patient. Patient verbalize understanding of the plan of care and contribute to goal setting.

## 2022-03-22 NOTE — PROGRESS NOTES
Hospitalist Progress Note    Patient: Deandre Guerraheath  Unit/Bed: 9J-65/745-W  YOB: 1960  MRN: [id number]  Acct: [de-identified]    PCP: Marlyn Weldon DO    Date of Admission: 3/16/2022    Assessment/Plan:    Sepsis secondary to abdominal wound - Resolving. present on arrival. SIRS 2/4 (Tachycardia and tachypnea). Abdominal wound is from dehiscence of hernia repair in 2018 with surgical mesh exposure, now with purulent discharge. Patient received 1 dose Ceftriaxone within the ED. 30cc/kg fluid bolus completed. CT abdomen without abscess. - MRSA nares negative, Blood Cx NGTD, Wound Cx MSSA and corynebacterium species  - Discontinue Linezolid and start Cefazolin  - Surgery planned for 3/25/22     Stage 1 CAROLE on CKD 3a with renal progression to 3b - No significant change. Renal progression appears to have occurred around 2/2022 and likely medication related with adjustments to home Bumex regimen. This may be his new baseline. Patient received 80mg Lasix within ED on arrival.  - Avoid nephrotoxic agents and renally dose medications  - Daily standing weights, strict I/O  - Daily BMP  - Nephrology following    Elevated microalbumin/Creatinine ratio - 1060mg/g. Likely secondary to hypertension and diabetes. - Nephrology following    Chronic systolic heart failure NYHA II - Without exacerbation. EF=55-60% on ECHO 5/25/21. Implanted CardioMEMS 6/16/2021. [address] 12/1/2020 demonstrating elevated LVEDP. Patient follows with CHF clinic.  - Patient not currently taking Entresto, spironolactone, carvedilol or Bumex per chart review and recommendations of outside provider per patient  - Cardiology following and plan for CardioMEMS download per cardiology to assess fluid status  - Lasix 40mg twice daily for fluid overload per nephrology    Elevated troponin - Secondary to type II demand ischemia. Patient denies chest pain, EKG without ST or T-wave abnormalities.   - No indication to trend     COPD GOLD 2 - Without exacerbation. PFT 3/15/2021 FEV1= 75% of predicted. - Continue home Montelukast, Symbicort  - Duonebs twice daily  - Continue Acapella and IS     NIDDM2 - Uncontrolled. A1c=9.9 (5/24/21)  - high dose ssi, POCT glucose 4x daily AC/HS, Hypoglycemia protocol  - Holding gabapentin for CAORLE  - Resume Elavil for neuropathic pain  - Inpatient BG goal 140-180; tight glucose control will be imperative in the laci and post-operative stated for adequate healing.     Primary HTN - Pressures are on the low/normal side. . Patient is not taking home hydralazine per recommendations of outside provider.  - Continue hydralazine to 25mg three times daily    LAURIE - CPAP at home  - Continue home CPAP     BPH - Noted  - Holding Tamsulosin for CAROLE     Hx of hypothyroidism - Noted  - Continue Synthroid     Hx of chronic back pain - Noted  - Resume home percocet  - Hold Flexeril for category X interaction with Linezolid    High anion gap metabolic acidosis - Resolved. Secondary to sepsis and elevated lactic acid. ?Pulmonary embolism - Resolved. Pulmonary embolism was considered within the ED, but CTA not obtainable due to worsening renal function. V/Q scan demonstrated low probability of pulmonary embolism. Moderate Hypokalemia - Resolved. Secondary to diuretic use. Patient also received 80mg Lasix within the ED. Magnesium and phosphorus within normal limits  - Continue potassium replacement as needed  - Continuous telemetry    Acute hypoxic respiratory failure - Resolved. Weaned to room air. Likely secondary to fluid overload. Negative viral respiratory panel 3/19/22.  - Wean supplemental oxygen as tolerated to maintain SpO2>92%.   - CardioMEMS download would be helpful; would appreciate cardiology to assist  - Hold maintenance fluids and limit volume if possible  - Continue Duoneb twice daily  - Hold off on diuretics      Expected discharge date:  TBD    Disposition:    [x] Home       [] TCU       [] Rehab       [] Psych       [] [facility]       [] [facility]       [] Other -    Chief Complaint: Fever    Hospital Course:  Momo Dominguez is a 63 y.o. male with PMHx of HTN, CHF, BPH, LAURIE, hypothyroidism, chronic back pain, CKD, and NIDDM2 who presented to [address] [place] [address] with complaint of fever that began approximately 5 days prior to presentation that was self-reported as high as 101F. The patient had associated symptoms of diaphoresis. He noted that he also has increased cough and shortness of breath from his baseline during these complaints. He has been taking Ibuprofen for his fever and OTC cough medicine for his cough which he states helps. The patient reported that his shortness of breath as well as his cough have been occurring for the last 4 months and have not worsened over that time period. He also stated that he is recently been instructed to hold his Bumex as well as his Entresto from his nephrologist and CHF clinic respectively. He denies headache, lightheadedness, change in vision, rhinorrhea, congestion, sore throat, cough, hemoptysis, chest pain, palpitations, abdominal pain, nausea, vomiting, hematemesis, change in bowel/bladder habits, hematochezia, hematuria, weakness, difficulty with ambulation, numbness/tingling, or known exposure to sick contacts. He admits to fever, chills, AKINS, PND, and shortness of breath. Within the emergency department the patient was administered Lasix 80mg, ASA 324mg, 1 dose of Ceftriaxone 1g, and 4mg morphine, He also underwent CT of the chest which demonstrated borderline cardiomegaly, calcification of the LAD and RCA coronary arteries, small right pleural effusion and splenomegaly. The patient was admitted to the hospital service for further care and management. Orders were placed for general surgery consult. Subjective (past 24 hours): Per nursing, no acute events overnight. The patient was transferred from ICU stepdown to medical floor overnight.  On examination today the patient reports complete resolution of hid right rib/flank pain and that the duoneb treatments have helped his breathing. He has no concerns of complaints and stated that he is very eager for the surgery planned. Review of Systems: 12 point review of systems completed and pertinent positives are noted in the HPI. All other systems reviewed and negative. Medications:  Reviewed    Infusion Medications    sodium chloride      dextrose       Scheduled Medications    furosemide  40 mg IntraVENous BID    heparin (porcine)  5,000 Units SubCUTAneous 3 times per day    ipratropium-albuterol  1 ampule Inhalation BID    hydrALAZINE  25 mg Oral TID    lidocaine  1 patch TransDERmal Daily    [Held by provider] cefepime  2,000 mg IntraVENous Q12H    sodium chloride flush  5-40 mL IntraVENous 2 times per day    linezolid  600 mg IntraVENous Q12H    [Held by provider] amitriptyline  10 mg Oral Nightly    aspirin EC  81 mg Oral Daily    [Held by provider] doxepin  50 mg Oral Nightly    budesonide-formoterol  2 puff Inhalation BID    montelukast  10 mg Oral Nightly    levothyroxine  175 mcg Oral Daily    rOPINIRole  1 mg Oral Q24H    oxybutynin  10 mg Oral Daily    rOPINIRole  2 mg Oral Nightly    carvedilol  25 mg Oral BID     PRN Meds: ipratropium-albuterol, sodium chloride flush, sodium chloride, ondansetron **OR** ondansetron, polyethylene glycol, acetaminophen **OR** acetaminophen, potassium chloride **OR** potassium alternative oral replacement **OR** potassium chloride, albuterol sulfate HFA, oxyCODONE-acetaminophen, [Held by provider] cyclobenzaprine, glucagon (rDNA), dextrose, glucose, dextrose bolus (hypoglycemia)      Intake/Output Summary (Last 24 hours) at 3/22/2022 0796  Last data filed at 3/22/2022 0655  Gross per 24 hour   Intake 860 ml   Output 2050 ml   Net -1190 ml       Diet:  ADULT DIET; Regular; 4 carb choices (60 gm/meal);  Low Sodium (2 gm)    Exam:  BP (!) 149/79   Pulse 67 Temp 97.4 °F (36.3 °C) (Axillary)   Resp 18   Ht 6' 1\" (1.854 m)   Wt (!) 335 lb (152 kg)   SpO2 97%   BMI 44.20 kg/m²     Physical Exam  Vitals and nursing note reviewed. Constitutional:       General: He is awake. Appearance: Normal appearance. He is morbidly obese. Interventions: Nasal cannula in place. HENT:      Head: Normocephalic and atraumatic. Right Ear: External ear normal.      Left Ear: External ear normal.      Nose: Nose normal.      Mouth/Throat:      Mouth: Mucous membranes are moist.      Pharynx: Oropharynx is clear. Eyes:      Conjunctiva/sclera: Conjunctivae normal.      Pupils: Pupils are equal, round, and reactive to light. Cardiovascular:      Rate and Rhythm: Normal rate and regular rhythm. Pulses: Normal pulses. Dorsalis pedis pulses are 2+ on the right side and 2+ on the left side. Posterior tibial pulses are 2+ on the right side and 2+ on the left side. Heart sounds: Normal heart sounds. Pulmonary:      Effort: Pulmonary effort is normal.      Breath sounds: Normal breath sounds. Decreased air movement present. No wheezing. Abdominal:      General: A surgical scar is present. Bowel sounds are normal.      Palpations: Abdomen is soft. Tenderness: There is no abdominal tenderness. Musculoskeletal:         General: Normal range of motion. Cervical back: Normal range of motion and neck supple. Right lower leg: No edema. Left lower leg: No edema. Skin:     General: Skin is warm and dry. Capillary Refill: Capillary refill takes less than 2 seconds. Neurological:      General: No focal deficit present. Mental Status: He is alert and oriented to person, place, and time. Mental status is at baseline. GCS: GCS eye subscore is 4. GCS verbal subscore is 5. GCS motor subscore is 6.    Psychiatric:         Attention and Perception: Attention and perception normal.         Mood and Affect: Mood and affect normal.         Speech: Speech normal.         Behavior: Behavior normal. Behavior is cooperative. Thought Content: Thought content normal.         Cognition and Memory: Cognition and memory normal.         Judgment: Judgment normal.         Labs:  Recent Labs     03/21/22  0516   WBC 5.8   HGB 7.8*   HCT 25.8*        Recent Labs     03/20/22  0426 03/21/22  0516 03/22/22  0541    134* 136   K 3.4* 3.8 3.3*   CL 99 97* 101   CO2 21* 23 23   BUN 38* 37* 38*   CREATININE 2.6* 2.6* 2.6*   CALCIUM 8.5 8.6 8.5     No results for input(s): AST, ALT, BILIDIR, BILITOT, ALKPHOS in the last 72 hours. No results for input(s): INR in the last 72 hours. No results for input(s): Whaley Aaron in the last 72 hours. Microbiology:    Urinalysis:   Lab Results   Component Value Date    NITRU NEGATIVE 03/16/2022    WBCUA 15-25 03/16/2022    BACTERIA NONE SEEN 03/16/2022    RBCUA > 100 03/16/2022    BLOODU LARGE 03/16/2022    SPECGRAV 1.011 03/16/2022    GLUCOSEU 500 04/13/2021       Radiology:  CT ABDOMEN PELVIS WO CONTRAST Additional Contrast? None   Final Result   Gas and fluid external to the abdomen and near the umbilicus along the patient's hernia mesh level. No evidence of intra-abdominal abscess. No acute abdominal or pelvic abnormalities. Multiple chronic findings. Stable appearance of the lung bases. **This report has been created using voice recognition software. It may contain minor errors which are inherent in voice recognition technology. **      Final report electronically signed by Dr. Shannan Ledesma on 3/19/2022 6:32 PM      XR CHEST PORTABLE   Final Result   Impression:   Cardiomegaly, no CHF. Small right pleural effusion. This document has been electronically signed by: Michael Tapia MD on    03/18/2022 07:27 PM      US RENAL COMPLETE   Final Result       1. Bilateral renal cysts. 2. No definite evidence of hydronephrosis on either side.    3. The bladder was not visualized. .               **This report has been created using voice recognition software. It may contain minor errors which are inherent in voice recognition technology. **      Final report electronically signed by DR Yolis Ron on 3/18/2022 11:30 AM      NM LUNG VENT/PERFUSION (VQ)   Final Result   Low probability of pulmonary embolus. Normal ventilation bilaterally with good washout and no areas of trapping. This document has been electronically signed by: Jeannine Kirkpatrick MD on    03/16/2022 05:52 PM      CT CHEST WO CONTRAST   Final Result       1. Borderline cardiomegaly. Calcification in the left anterior descending and right coronary arteries. 2. 13 mm metallic density in the left lower lobe pulmonary artery, new since previous CT scan of the chest dated 24th of May 2021. 3. Small right pleural effusion. 4. Small mediastinal lymph nodes. 5. Thickening off the interstitial lung markings. 6. Thoracic spondylosis. 7. Splenomegaly. 8. Small hiatal hernia. **This report has been created using voice recognition software. It may contain minor errors which are inherent in voice recognition technology. **      Final report electronically signed by DR Yolis Ron on 3/16/2022 12:47 PM      XR CHEST PORTABLE   Final Result   1. Mild cardiomegaly and increased pulmonary vascularity. 2. Otherwise negative chest x-ray. .               **This report has been created using voice recognition software. It may contain minor errors which are inherent in voice recognition technology. **      Final report electronically signed by DR Yolis Ron on 3/16/2022 11:53 AM          DVT prophylaxis:  [x] Lovenox  [] SCDs  [] SQ Heparin  [] Encourage ambulation  [] Already on anticoagulation  [] Other -      Code Status: Full Code    PT/OT Evaluation Status: N/A    Telemetry:  [x] Yes  [] No    Electronically signed by Rosette Nguyen DO, MBA on 3/22/2022 at 7:38 AM

## 2022-03-22 NOTE — PROGRESS NOTES
Patient abdominal dressing changed prior to transfer to unit. Dressing is clean, dry, intact and patient declines dressing change at this time as the skin is \"irritated\" and \"can be done later this morning\". Patient states he is an active partcipant in his dressing changes and has been over the course of the past several months. Patient pleasant throughout entire encounter.

## 2022-03-22 NOTE — PROGRESS NOTES
Pupils round, equal, and reactive to light from 5mm to 3mm. Pt is alert and oriented x4 speech is clear. Unlabored respirations, pt had no cough present, crackles auscultated in lower lobes of the lungs. Upper extremities are warm, dry, and intact. Pt hand grasps are equal and strong with a negative arm drift. No edema is present. Radial pulses is 2+ rate at 71 beats per minute. Heart sounds are auscultated S1, S2 node noted. Skin turgor <3 and capillary refill <3. Bowel sounds are active in all four quadrants. Pt has an abdominal wound in right upper and lower quadrants. Dorsalis pedis and posterior tibials are both 2+ rate of 71 beats per minutes. Pt states that he is in pain. Pt is currently in bed and his call light is within reach.

## 2022-03-22 NOTE — PROGRESS NOTES
[facility]   Nephrology progress note  3/22/2022, 9:58 AM      Pt Name:    Christy Becerra  MRN:     [id number]     YOB: 1960  Admit Date:    3/16/2022 10:41 AM  Primary Care Physician:  Nesha Malone DO   Room number  8S-25/941-V    Chief Complaint: Nephrology following for CAROLE/CKD    Subjective:  Patient seen and examined  Feels okay  No acute distress  No chest pain  No shortness of breath  Still has some lower extremity swelling  Sitting at his bedside chair      Objective:  24HR INTAKE/OUTPUT:      Intake/Output Summary (Last 24 hours) at 3/22/2022 0958  Last data filed at 3/22/2022 0655  Gross per 24 hour   Intake 680 ml   Output 2050 ml   Net -1370 ml     I/O last 3 completed shifts: In: 1360 [P.O.:1360]  Out: 3225 [Urine:3225]  No intake/output data recorded. Admission weight: (!) 328 lb (148.8 kg)  Wt Readings from Last 3 Encounters:   03/21/22 (!) 335 lb (152 kg)   03/03/22 (!) 333 lb 6.4 oz (151.2 kg)   02/15/22 (!) 338 lb 9.6 oz (153.6 kg)     Body mass index is 44.2 kg/m².     Physical examination  VITALS:     Vitals:    03/21/22 2141 03/22/22 0419 03/22/22 0745 03/22/22 0905   BP:  (!) 149/79 139/82    Pulse:  67 72    Resp: 16 18 18 17   Temp:  97.4 °F (36.3 °C) 97.9 °F (36.6 °C)    TempSrc:  Axillary Oral    SpO2: 94% 97%  97%   Weight:       Height:         General Appearance: alert and cooperative with exam, appears comfortable, no distress  Mouth/Throat: Oral mucosa moist  Neck: No JVD  Lungs: Air entry B/L, no rales, no use of accessory muscles  GI: soft,  +obese  Extremities: B/L 2+ LE edema      Lab Data  CBC:   Recent Labs     03/21/22  0516   WBC 5.8   HGB 7.8*   HCT 25.8*        BMP:  Recent Labs     03/20/22  0426 03/21/22  0516 03/22/22  0541    134* 136   K 3.4* 3.8 3.3*   CL 99 97* 101   CO2 21* 23 23   BUN 38* 37* 38*   CREATININE 2.6* 2.6* 2.6*   GLUCOSE 90 81 86   CALCIUM 8.5 8.6 8.5   MG 1.9  --   --      Hepatic: No results for input(s): LABALBU, AST, ALT, ALB, BILITOT, ALKPHOS in the last 72 hours. Meds:  Infusion:    sodium chloride      dextrose       Meds:    ceFAZolin  2,000 mg IntraVENous Q8H    furosemide  40 mg IntraVENous BID    heparin (porcine)  5,000 Units SubCUTAneous 3 times per day    ipratropium-albuterol  1 ampule Inhalation BID    hydrALAZINE  25 mg Oral TID    lidocaine  1 patch TransDERmal Daily    [Held by provider] cefepime  2,000 mg IntraVENous Q12H    sodium chloride flush  5-40 mL IntraVENous 2 times per day    [Held by provider] amitriptyline  10 mg Oral Nightly    aspirin EC  81 mg Oral Daily    [Held by provider] doxepin  50 mg Oral Nightly    budesonide-formoterol  2 puff Inhalation BID    montelukast  10 mg Oral Nightly    levothyroxine  175 mcg Oral Daily    rOPINIRole  1 mg Oral Q24H    oxybutynin  10 mg Oral Daily    rOPINIRole  2 mg Oral Nightly    carvedilol  25 mg Oral BID     Meds prn: ipratropium-albuterol, sodium chloride flush, sodium chloride, ondansetron **OR** ondansetron, polyethylene glycol, acetaminophen **OR** acetaminophen, potassium chloride **OR** potassium alternative oral replacement **OR** potassium chloride, albuterol sulfate HFA, oxyCODONE-acetaminophen, [Held by provider] cyclobenzaprine, glucagon (rDNA), dextrose, glucose, dextrose bolus (hypoglycemia)       Impression and Plan:  1. CAROLE on CKD 3A. Serum creatinine staying around 2.2 to 2.3  Baseline ~ 1.3  Multiple risk factors for CKD including chronic diastolic heart failure/cardiorenal, volume depletion from diarrhea  Likely has sustained CAROLE from ATN but cannot rule out progressive CKD due to underlying diabetic nephropathy and also due to post adaptive FSGS from morbid obesity  Continue to monitor, continue to optimize fluid status  Still remains fluid overloaded  Continue with IV Lasix for now    2. Volume overload. on IV Lasix  3.   Proteinuria secondary to diabetic nephropathy +/-secondary FSGS (post adaptive from obesity)  4. Hypokalemia. Secondary to diuretics, replace and recheck  5. Abdominal wound. Being followed by surgery. For possible surgical intervention later this week  6. Chronic diastolic dysfunction with volume overload. Plan as above  7. Chronically uncontrolled diabetes mellitus  8. Essential hypertension. Blood pressure readings noted. Continue with Coreg and hydralazine.   Stable    D/W patient     Amparo Mcduffie MD  [facility]

## 2022-03-23 DIAGNOSIS — I50.32 CHF (CONGESTIVE HEART FAILURE), NYHA CLASS II, CHRONIC, DIASTOLIC (HCC): Primary | ICD-10-CM

## 2022-03-23 LAB
ANION GAP SERPL CALCULATED.3IONS-SCNC: 12 MEQ/L (ref 8–16)
BACTERIA: ABNORMAL
BASE EXCESS MIXED: 1.8 MMOL/L (ref -2–3)
BASOPHILS # BLD: 0.5 %
BASOPHILS ABSOLUTE: 0 THOU/MM3 (ref 0–0.1)
BILIRUBIN URINE: NEGATIVE
BLOOD, URINE: ABNORMAL
BUN BLDV-MCNC: 41 MG/DL (ref 7–22)
CALCIUM SERPL-MCNC: 8.6 MG/DL (ref 8.5–10.5)
CASTS: ABNORMAL /LPF
CASTS: ABNORMAL /LPF
CHARACTER, URINE: CLEAR
CHLORIDE BLD-SCNC: 101 MEQ/L (ref 98–111)
CO2: 23 MEQ/L (ref 23–33)
COLLECTED BY:: NORMAL
COLOR: YELLOW
CREAT SERPL-MCNC: 2.9 MG/DL (ref 0.4–1.2)
CRYSTALS: ABNORMAL
EOSINOPHIL # BLD: 4 %
EOSINOPHILS ABSOLUTE: 0.2 THOU/MM3 (ref 0–0.4)
EPITHELIAL CELLS, UA: ABNORMAL /HPF
ERYTHROCYTE [DISTWIDTH] IN BLOOD BY AUTOMATED COUNT: 17.4 % (ref 11.5–14.5)
ERYTHROCYTE [DISTWIDTH] IN BLOOD BY AUTOMATED COUNT: 53.1 FL (ref 35–45)
GFR SERPL CREATININE-BSD FRML MDRD: 22 ML/MIN/1.73M2
GLUCOSE BLD-MCNC: 100 MG/DL (ref 70–108)
GLUCOSE, URINE: NEGATIVE MG/DL
HCO3, MIXED: 27 MMOL/L (ref 23–28)
HCT VFR BLD CALC: 24.8 % (ref 42–52)
HEMOGLOBIN: 7.4 GM/DL (ref 14–18)
IMMATURE GRANS (ABS): 0.17 THOU/MM3 (ref 0–0.07)
IMMATURE GRANULOCYTES: 3 %
KETONES, URINE: NEGATIVE
LEUKOCYTE ESTERASE, URINE: NEGATIVE
LYMPHOCYTES # BLD: 12 %
LYMPHOCYTES ABSOLUTE: 0.7 THOU/MM3 (ref 1–4.8)
MAGNESIUM: 1.9 MG/DL (ref 1.6–2.4)
MCH RBC QN AUTO: 25.8 PG (ref 26–33)
MCHC RBC AUTO-ENTMCNC: 29.8 GM/DL (ref 32.2–35.5)
MCV RBC AUTO: 86.4 FL (ref 80–94)
MISCELLANEOUS LAB TEST RESULT: ABNORMAL
MONOCYTES # BLD: 8.1 %
MONOCYTES ABSOLUTE: 0.5 THOU/MM3 (ref 0.4–1.3)
NITRITE, URINE: NEGATIVE
NUCLEATED RED BLOOD CELLS: 0 /100 WBC
O2 SAT, MIXED: 51 %
PCO2, MIXED VENOUS: 47 MMHG (ref 41–51)
PH UA: 5 (ref 5–9)
PH, MIXED: 7.38 (ref 7.31–7.41)
PLATELET # BLD: 268 THOU/MM3 (ref 130–400)
PMV BLD AUTO: 8.5 FL (ref 9.4–12.4)
PO2 MIXED: 28 MMHG (ref 25–40)
POTASSIUM SERPL-SCNC: 3.8 MEQ/L (ref 3.5–5.2)
PROTEIN UA: 30 MG/DL
RBC # BLD: 2.87 MILL/MM3 (ref 4.7–6.1)
RBC URINE: ABNORMAL /HPF
RENAL EPITHELIAL, UA: ABNORMAL
SEG NEUTROPHILS: 72.4 %
SEGMENTED NEUTROPHILS ABSOLUTE COUNT: 4.1 THOU/MM3 (ref 1.8–7.7)
SODIUM BLD-SCNC: 136 MEQ/L (ref 135–145)
SPECIFIC GRAVITY UA: 1.01 (ref 1–1.03)
UROBILINOGEN, URINE: 0.2 EU/DL (ref 0–1)
WBC # BLD: 5.7 THOU/MM3 (ref 4.8–10.8)
WBC UA: ABNORMAL /HPF
YEAST: ABNORMAL

## 2022-03-23 PROCEDURE — 6360000002 HC RX W HCPCS: Performed by: INTERNAL MEDICINE

## 2022-03-23 PROCEDURE — 82803 BLOOD GASES ANY COMBINATION: CPT

## 2022-03-23 PROCEDURE — 93264 REM MNTR WRLS P-ART PRS SNR: CPT | Performed by: NURSE PRACTITIONER

## 2022-03-23 PROCEDURE — 94760 N-INVAS EAR/PLS OXIMETRY 1: CPT

## 2022-03-23 PROCEDURE — 99233 SBSQ HOSP IP/OBS HIGH 50: CPT | Performed by: INTERNAL MEDICINE

## 2022-03-23 PROCEDURE — 1200000003 HC TELEMETRY R&B

## 2022-03-23 PROCEDURE — 85025 COMPLETE CBC W/AUTO DIFF WBC: CPT

## 2022-03-23 PROCEDURE — 6370000000 HC RX 637 (ALT 250 FOR IP): Performed by: STUDENT IN AN ORGANIZED HEALTH CARE EDUCATION/TRAINING PROGRAM

## 2022-03-23 PROCEDURE — 2580000003 HC RX 258: Performed by: STUDENT IN AN ORGANIZED HEALTH CARE EDUCATION/TRAINING PROGRAM

## 2022-03-23 PROCEDURE — 2500000003 HC RX 250 WO HCPCS: Performed by: STUDENT IN AN ORGANIZED HEALTH CARE EDUCATION/TRAINING PROGRAM

## 2022-03-23 PROCEDURE — 80048 BASIC METABOLIC PNL TOTAL CA: CPT

## 2022-03-23 PROCEDURE — 1200000000 HC SEMI PRIVATE

## 2022-03-23 PROCEDURE — 6370000000 HC RX 637 (ALT 250 FOR IP): Performed by: INTERNAL MEDICINE

## 2022-03-23 PROCEDURE — 81001 URINALYSIS AUTO W/SCOPE: CPT

## 2022-03-23 PROCEDURE — 99232 SBSQ HOSP IP/OBS MODERATE 35: CPT | Performed by: INTERNAL MEDICINE

## 2022-03-23 PROCEDURE — 83735 ASSAY OF MAGNESIUM: CPT

## 2022-03-23 PROCEDURE — 94640 AIRWAY INHALATION TREATMENT: CPT

## 2022-03-23 PROCEDURE — 6360000002 HC RX W HCPCS: Performed by: STUDENT IN AN ORGANIZED HEALTH CARE EDUCATION/TRAINING PROGRAM

## 2022-03-23 PROCEDURE — 36415 COLL VENOUS BLD VENIPUNCTURE: CPT

## 2022-03-23 RX ORDER — LIDOCAINE 4 G/G
1 PATCH TOPICAL DAILY
Status: DISCONTINUED | OUTPATIENT
Start: 2022-03-23 | End: 2022-03-30 | Stop reason: HOSPADM

## 2022-03-23 RX ORDER — LIDOCAINE 4 G/G
2 PATCH TOPICAL DAILY
Status: DISCONTINUED | OUTPATIENT
Start: 2022-03-23 | End: 2022-03-30 | Stop reason: HOSPADM

## 2022-03-23 RX ORDER — LIDOCAINE 4 G/G
3 PATCH TOPICAL DAILY
Status: DISCONTINUED | OUTPATIENT
Start: 2022-03-23 | End: 2022-03-30 | Stop reason: HOSPADM

## 2022-03-23 RX ORDER — FUROSEMIDE 10 MG/ML
40 INJECTION INTRAMUSCULAR; INTRAVENOUS DAILY
Status: DISCONTINUED | OUTPATIENT
Start: 2022-03-23 | End: 2022-03-26

## 2022-03-23 RX ORDER — HEPARIN SODIUM 5000 [USP'U]/ML
5000 INJECTION, SOLUTION INTRAVENOUS; SUBCUTANEOUS EVERY 8 HOURS
Status: DISCONTINUED | OUTPATIENT
Start: 2022-03-26 | End: 2022-03-30 | Stop reason: HOSPADM

## 2022-03-23 RX ADMIN — OXYCODONE AND ACETAMINOPHEN 1 TABLET: 10; 325 TABLET ORAL at 21:54

## 2022-03-23 RX ADMIN — HYDRALAZINE HYDROCHLORIDE 25 MG: 25 TABLET, FILM COATED ORAL at 20:22

## 2022-03-23 RX ADMIN — OXYBUTYNIN CHLORIDE 10 MG: 10 TABLET, EXTENDED RELEASE ORAL at 08:02

## 2022-03-23 RX ADMIN — CEFAZOLIN 2000 MG: 10 INJECTION, POWDER, FOR SOLUTION INTRAVENOUS at 04:28

## 2022-03-23 RX ADMIN — ROPINIROLE HYDROCHLORIDE 1 MG: 1 TABLET, FILM COATED ORAL at 12:40

## 2022-03-23 RX ADMIN — HYDRALAZINE HYDROCHLORIDE 25 MG: 25 TABLET, FILM COATED ORAL at 15:33

## 2022-03-23 RX ADMIN — MONTELUKAST SODIUM 10 MG: 10 TABLET ORAL at 20:22

## 2022-03-23 RX ADMIN — BUDESONIDE AND FORMOTEROL FUMARATE DIHYDRATE 2 PUFF: 160; 4.5 AEROSOL RESPIRATORY (INHALATION) at 18:28

## 2022-03-23 RX ADMIN — HEPARIN SODIUM 5000 UNITS: 5000 INJECTION INTRAVENOUS; SUBCUTANEOUS at 05:41

## 2022-03-23 RX ADMIN — BUDESONIDE AND FORMOTEROL FUMARATE DIHYDRATE 2 PUFF: 160; 4.5 AEROSOL RESPIRATORY (INHALATION) at 08:37

## 2022-03-23 RX ADMIN — HYDRALAZINE HYDROCHLORIDE 25 MG: 25 TABLET, FILM COATED ORAL at 08:04

## 2022-03-23 RX ADMIN — Medication 2000 MG: at 20:20

## 2022-03-23 RX ADMIN — SODIUM CHLORIDE, PRESERVATIVE FREE 10 ML: 5 INJECTION INTRAVENOUS at 08:04

## 2022-03-23 RX ADMIN — Medication 2000 MG: at 12:40

## 2022-03-23 RX ADMIN — FUROSEMIDE 40 MG: 10 INJECTION, SOLUTION INTRAMUSCULAR; INTRAVENOUS at 08:04

## 2022-03-23 RX ADMIN — LEVOTHYROXINE SODIUM 175 MCG: 0.15 TABLET ORAL at 05:41

## 2022-03-23 RX ADMIN — IPRATROPIUM BROMIDE AND ALBUTEROL SULFATE 1 AMPULE: .5; 3 SOLUTION RESPIRATORY (INHALATION) at 08:28

## 2022-03-23 RX ADMIN — CARVEDILOL 25 MG: 25 TABLET, FILM COATED ORAL at 17:04

## 2022-03-23 RX ADMIN — ROPINIROLE HYDROCHLORIDE 2 MG: 1 TABLET, FILM COATED ORAL at 20:22

## 2022-03-23 RX ADMIN — SODIUM CHLORIDE, PRESERVATIVE FREE 10 ML: 5 INJECTION INTRAVENOUS at 20:28

## 2022-03-23 RX ADMIN — CARVEDILOL 25 MG: 25 TABLET, FILM COATED ORAL at 08:03

## 2022-03-23 RX ADMIN — IPRATROPIUM BROMIDE AND ALBUTEROL SULFATE 1 AMPULE: .5; 3 SOLUTION RESPIRATORY (INHALATION) at 18:21

## 2022-03-23 RX ADMIN — ONDANSETRON 4 MG: 2 INJECTION INTRAMUSCULAR; INTRAVENOUS at 03:21

## 2022-03-23 RX ADMIN — HEPARIN SODIUM 5000 UNITS: 5000 INJECTION INTRAVENOUS; SUBCUTANEOUS at 12:40

## 2022-03-23 ASSESSMENT — PAIN SCALES - GENERAL
PAINLEVEL_OUTOF10: 7
PAINLEVEL_OUTOF10: 8
PAINLEVEL_OUTOF10: 6
PAINLEVEL_OUTOF10: 8

## 2022-03-23 ASSESSMENT — PAIN DESCRIPTION - DESCRIPTORS: DESCRIPTORS: ACHING

## 2022-03-23 ASSESSMENT — PAIN DESCRIPTION - ORIENTATION: ORIENTATION: LOWER

## 2022-03-23 ASSESSMENT — PAIN DESCRIPTION - PAIN TYPE: TYPE: CHRONIC PAIN

## 2022-03-23 ASSESSMENT — PAIN DESCRIPTION - ONSET: ONSET: ON-GOING

## 2022-03-23 ASSESSMENT — PAIN DESCRIPTION - FREQUENCY: FREQUENCY: CONTINUOUS

## 2022-03-23 ASSESSMENT — PAIN DESCRIPTION - LOCATION: LOCATION: BACK

## 2022-03-23 NOTE — RT PROTOCOL NOTE
RT Inhaler-Nebulizer Bronchodilator Protocol Note    There is a bronchodilator order in the chart from a provider indicating to follow the RT Bronchodilator Protocol and there is an Initiate RT Inhaler-Nebulizer Bronchodilator Protocol order as well (see protocol at bottom of note). CXR Findings:  No results found. The findings from the last RT Protocol Assessment were as follows:   History Pulmonary Disease: None or smoker <15 pack years  Respiratory Pattern: Dyspnea on exertion or RR 21-25 bpm  Breath Sounds: Slightly diminished and/or crackles  Cough: Strong, productive  Indication for Bronchodilator Therapy: Decreased or absent breath sounds  Bronchodilator Assessment Score: 5    Aerosolized bronchodilator medication orders have been revised according to the RT Inhaler-Nebulizer Bronchodilator Protocol below. Respiratory Therapist to perform RT Therapy Protocol Assessment initially then follow the protocol. Repeat RT Therapy Protocol Assessment PRN for score 0-3 or on second treatment, BID, and PRN for scores above 3. No Indications - adjust the frequency to every 6 hours PRN wheezing or bronchospasm, if no treatments needed after 48 hours then discontinue using Per Protocol order mode. If indication present, adjust the RT bronchodilator orders based on the Bronchodilator Assessment Score as indicated below. Use Inhaler orders unless patient has one or more of the following: on home nebulizer, not able to hold breath for 10 seconds, is not alert and oriented, cannot activate and use MDI correctly, or respiratory rate 25 breaths per minute or more, then use the equivalent nebulizer order(s) with same Frequency and PRN reasons based on the score. If a patient is on this medication at home then do not decrease Frequency below that used at home.     0-3 - enter or revise RT bronchodilator order(s) to equivalent RT Bronchodilator order with Frequency of every 4 hours PRN for wheezing or increased work of breathing using Per Protocol order mode. 4-6 - enter or revise RT Bronchodilator order(s) to two equivalent RT bronchodilator orders with one order with BID Frequency and one order with Frequency of every 4 hours PRN wheezing or increased work of breathing using Per Protocol order mode. 7-10 - enter or revise RT Bronchodilator order(s) to two equivalent RT bronchodilator orders with one order with TID Frequency and one order with Frequency of every 4 hours PRN wheezing or increased work of breathing using Per Protocol order mode. 11-13 - enter or revise RT Bronchodilator order(s) to one equivalent RT bronchodilator order with QID Frequency and an Albuterol order with Frequency of every 4 hours PRN wheezing or increased work of breathing using Per Protocol order mode. Greater than 13 - enter or revise RT Bronchodilator order(s) to one equivalent RT bronchodilator order with every 4 hours Frequency and an Albuterol order with Frequency of every 2 hours PRN wheezing or increased work of breathing using Per Protocol order mode. RT to enter RT Home Evaluation for COPD & MDI Assessment order using Per Protocol order mode.     Electronically signed by Jocelyn Lozada RCP on 3/23/2022 at 8:34 AM

## 2022-03-23 NOTE — PLAN OF CARE
Problem: Pain:  Goal: Pain level will decrease  Description: Pain level will decrease  Outcome: Ongoing     Problem: Impaired respiratory status  Goal: Clear lung sounds  Description: Clear lung sounds  3/22/2022 2018 by Layne Nick RCP  Outcome: Ongoing     Problem: Falls - Risk of:  Goal: Will remain free from falls  Description: Will remain free from falls  Outcome: Ongoing     Problem: Skin Integrity:  Goal: Absence of new skin breakdown  Description: Absence of new skin breakdown  Outcome: Ongoing     Problem: Discharge Planning:  Goal: Discharged to appropriate level of care  Description: Discharged to appropriate level of care  Outcome: Ongoing

## 2022-03-23 NOTE — PROGRESS NOTES
Hospitalist Progress Note    Patient: Stephanie Myles  Unit/Bed: 8K-77/559-I  YOB: 1960  MRN: [id number]  Acct: [de-identified]    PCP: José Manuel Leyva DO    Date of Admission: 3/16/2022    Assessment/Plan:    Sepsis secondary to abdominal wound - Resolving. present on arrival. SIRS 2/4 (Tachycardia and tachypnea). Abdominal wound is from dehiscence of hernia repair in 2018 with surgical mesh exposure, now with purulent discharge. Patient received 1 dose Ceftriaxone within the ED. 30cc/kg fluid bolus completed. CT abdomen without abscess. - MRSA nares negative, Blood Cx NGTD, Wound Cx MSSA and corynebacterium species  - Continue Cefazolin  - Surgery planned for 3/25/22     Stage 1 CAROLE on CKD 3a with renal progression to 3b - Worsening. Renal progression appears to have occurred around 2/2022 and likely medication related with adjustments to home Bumex regimen. This may be his new baseline. Mild worsening of CAROLE during admission is to be expected with use of diuretic.  - Avoid nephrotoxic agents and renally dose medications  - Daily standing weights, strict I/O  - Daily BMP  - Nephrology following    Elevated microalbumin/Creatinine ratio - 1060mg/g. Likely secondary to hypertension and diabetes. - Nephrology following    Chronic systolic heart failure NYHA II - Without exacerbation. EF=55-60% on ECHO 5/25/21. Implanted CardioMEMS 6/16/2021. [address] 12/1/2020 demonstrating elevated LVEDP. Patient follows with CHF clinic. CardioMEMS download completed 3/22/22 with only mild elevation from baseline.  - Patient not currently taking Entresto, spironolactone, carvedilol or Bumex per chart review and recommendations of outside provider per patient  - Decrease to Lasix 40mg daily due to increased Creatinine  - Cardiology following    Elevated troponin - Secondary to type II demand ischemia. Patient denies chest pain, EKG without ST or T-wave abnormalities.   - No indication to trend     COPD GOLD 2 - Without exacerbation. PFT 3/15/2021 FEV1= 75% of predicted. - Continue home Montelukast, Symbicort  - Duonebs twice daily  - Continue Acapella and IS     NIDDM2 - Uncontrolled. A1c=9.9 (5/24/21)  - high dose ssi, POCT glucose 4x daily AC/HS, Hypoglycemia protocol  - Holding gabapentin for CAROLE  - Resume Elavil for neuropathic pain  - Inpatient BG goal 140-180; tight glucose control will be imperative in the laci and post-operative state for adequate healing.     Primary HTN - Currently normotensive. Patient is not taking home hydralazine per recommendations of outside provider.  - Continue hydralazine to 25mg three times daily    Iron deficiency anemia - Iron 40, Ferritin 423, Iron sat 20%. - Following with nephrology; EPO at their discretion    LAURIE - CPAP at home  - Continue home CPAP     BPH - Noted  - Holding Tamsulosin for CAROLE     Hx of hypothyroidism - Noted  - Continue Synthroid     Hx of chronic back pain - Noted  - Resume home percocet  - Hold Flexeril for category X interaction with Linezolid    High anion gap metabolic acidosis - Resolved. Secondary to sepsis and elevated lactic acid. ?Pulmonary embolism - Resolved. Pulmonary embolism was considered within the ED, but CTA not obtainable due to worsening renal function. V/Q scan demonstrated low probability of pulmonary embolism. Moderate Hypokalemia - Resolved. Secondary to diuretic use. Patient also received 80mg Lasix within the ED. Magnesium and phosphorus within normal limits  - Continue potassium replacement as needed  - Continuous telemetry    Acute hypoxic respiratory failure - Resolved. Weaned to room air. Likely secondary to fluid overload. Negative viral respiratory panel 3/19/22.  - Wean supplemental oxygen as tolerated to maintain SpO2>92%.   - CardioMEMS download would be helpful; would appreciate cardiology to assist  - Hold maintenance fluids and limit volume if possible  - Continue Duoneb twice daily  - Hold off on diuretics      Expected discharge date:  TBD    Disposition:    [x] Home       [] TCU       [] Rehab       [] Psych       [] SNF       [] [place]       [] Other -    Chief Complaint: Fever    Hospital Course:  Keny Meeks is a 63 y.o. male with PMHx of HTN, CHF, BPH, LAURIE, hypothyroidism, chronic back pain, CKD, and NIDDM2 who presented to [address] [place] [address] with complaint of fever that began approximately 5 days prior to presentation that was self-reported as high as 101F. The patient had associated symptoms of diaphoresis. He noted that he also has increased cough and shortness of breath from his baseline during these complaints. He has been taking Ibuprofen for his fever and OTC cough medicine for his cough which he states helps. The patient reported that his shortness of breath as well as his cough have been occurring for the last 4 months and have not worsened over that time period. He also stated that he is recently been instructed to hold his Bumex as well as his Entresto from his nephrologist and CHF clinic respectively. He denies headache, lightheadedness, change in vision, rhinorrhea, congestion, sore throat, cough, hemoptysis, chest pain, palpitations, abdominal pain, nausea, vomiting, hematemesis, change in bowel/bladder habits, hematochezia, hematuria, weakness, difficulty with ambulation, numbness/tingling, or known exposure to sick contacts. He admits to fever, chills, AKINS, PND, and shortness of breath. Within the emergency department the patient was administered Lasix 80mg, ASA 324mg, 1 dose of Ceftriaxone 1g, and 4mg morphine, He also underwent CT of the chest which demonstrated borderline cardiomegaly, calcification of the LAD and RCA coronary arteries, small right pleural effusion and splenomegaly. The patient was admitted to the hospital service for further care and management. Orders were placed for general surgery consult.     Subjective (past 24 hours): Per nursing, the patient again had right sided flank pain. He was administered one dose dilaudid for pain which made him very nauseous. He declined additional doses. The patient stated that the pain \"came out of nowhere\", and was similar to his previous pain he had on admission. On examination today he stated the pain is resolving and will continue the lidocaine patches already ordered. He has no other concerns of complaints. Review of Systems: 12 point review of systems completed and pertinent positives are noted in the HPI. All other systems reviewed and negative.     Medications:  Reviewed    Infusion Medications    sodium chloride      dextrose       Scheduled Medications    HYDROmorphone  0.5 mg IntraVENous Once    furosemide  40 mg IntraVENous Daily    lidocaine  1 patch TransDERmal Daily    Or    lidocaine  2 patch TransDERmal Daily    Or    lidocaine  3 patch TransDERmal Daily    [START ON 3/26/2022] heparin (porcine)  5,000 Units SubCUTAneous Q8H    ceFAZolin  2,000 mg IntraVENous Q8H    heparin (porcine)  5,000 Units SubCUTAneous 3 times per day    ipratropium-albuterol  1 ampule Inhalation BID    hydrALAZINE  25 mg Oral TID    sodium chloride flush  5-40 mL IntraVENous 2 times per day    [Held by provider] amitriptyline  10 mg Oral Nightly    [Held by provider] aspirin EC  81 mg Oral Daily    [Held by provider] doxepin  50 mg Oral Nightly    budesonide-formoterol  2 puff Inhalation BID    montelukast  10 mg Oral Nightly    levothyroxine  175 mcg Oral Daily    rOPINIRole  1 mg Oral Q24H    oxybutynin  10 mg Oral Daily    rOPINIRole  2 mg Oral Nightly    carvedilol  25 mg Oral BID     PRN Meds: ipratropium-albuterol, sodium chloride flush, sodium chloride, ondansetron **OR** ondansetron, polyethylene glycol, acetaminophen **OR** acetaminophen, potassium chloride **OR** potassium alternative oral replacement **OR** potassium chloride, albuterol sulfate HFA, oxyCODONE-acetaminophen, [Held by provider] cyclobenzaprine, glucagon (rDNA), dextrose, glucose, dextrose bolus (hypoglycemia)    No intake or output data in the 24 hours ending 22 0746    Diet:  ADULT DIET; Regular; 4 carb choices (60 gm/meal); Low Sodium (2 gm)    Exam:  /61   Pulse 65   Temp 97.9 °F (36.6 °C) (Oral)   Resp 17   Ht 6' 1\" (1.854 m)   Wt (!) 335 lb (152 kg)   SpO2 93%   BMI 44.20 kg/m²     Physical Exam  Vitals and nursing note reviewed. Constitutional:       General: He is awake. Appearance: Normal appearance. He is morbidly obese. Interventions: Nasal cannula in place. HENT:      Head: Normocephalic and atraumatic. Right Ear: External ear normal.      Left Ear: External ear normal.      Nose: Nose normal.      Mouth/Throat:      Mouth: Mucous membranes are moist.      Pharynx: Oropharynx is clear. Eyes:      Conjunctiva/sclera: Conjunctivae normal.      Pupils: Pupils are equal, round, and reactive to light. Cardiovascular:      Rate and Rhythm: Normal rate and regular rhythm. Pulses: Normal pulses. Dorsalis pedis pulses are 2+ on the right side and 2+ on the left side. Posterior tibial pulses are 2+ on the right side and 2+ on the left side. Heart sounds: Normal heart sounds. Pulmonary:      Effort: Pulmonary effort is normal.      Breath sounds: Normal breath sounds. Decreased air movement present. No wheezing. Abdominal:      General: A surgical scar is present. Bowel sounds are normal.      Palpations: Abdomen is soft. Tenderness: There is no abdominal tenderness. Musculoskeletal:         General: Normal range of motion. Cervical back: Normal range of motion and neck supple. Right lower le+ Pitting Edema present. Left lower le+ Pitting Edema present. Skin:     General: Skin is warm and dry. Capillary Refill: Capillary refill takes less than 2 seconds. Neurological:      General: No focal deficit present.       Mental Status: He is alert and oriented to person, place, and time. Mental status is at baseline. GCS: GCS eye subscore is 4. GCS verbal subscore is 5. GCS motor subscore is 6. Psychiatric:         Attention and Perception: Attention and perception normal.         Mood and Affect: Mood and affect normal.         Speech: Speech normal.         Behavior: Behavior normal. Behavior is cooperative. Thought Content: Thought content normal.         Cognition and Memory: Cognition and memory normal.         Judgment: Judgment normal.         Labs:  Recent Labs     03/21/22  0516   WBC 5.8   HGB 7.8*   HCT 25.8*        Recent Labs     03/21/22  0516 03/21/22  0516 03/22/22  0541 03/22/22  1738 03/23/22  0523   *  --  136  --  136   K 3.8   < > 3.3* 3.6 3.8   CL 97*  --  101  --  101   CO2 23  --  23  --  23   BUN 37*  --  38*  --  41*   CREATININE 2.6*  --  2.6*  --  2.9*   CALCIUM 8.6  --  8.5  --  8.6    < > = values in this interval not displayed. No results for input(s): AST, ALT, BILIDIR, BILITOT, ALKPHOS in the last 72 hours. No results for input(s): INR in the last 72 hours. No results for input(s): Missy Ends in the last 72 hours. Microbiology:    Urinalysis:   Lab Results   Component Value Date    NITRU NEGATIVE 03/23/2022    WBCUA 5-9 03/23/2022    BACTERIA NONE SEEN 03/23/2022    RBCUA 25-50 03/23/2022    BLOODU MODERATE 03/23/2022    SPECGRAV 1.015 03/23/2022    GLUCOSEU 500 04/13/2021       Radiology:  CT ABDOMEN PELVIS WO CONTRAST Additional Contrast? None   Final Result   Gas and fluid external to the abdomen and near the umbilicus along the patient's hernia mesh level. No evidence of intra-abdominal abscess. No acute abdominal or pelvic abnormalities. Multiple chronic findings. Stable appearance of the lung bases. **This report has been created using voice recognition software. It may contain minor errors which are inherent in voice recognition technology. **      Final report electronically signed by Dr. Darya Adames on 3/19/2022 6:32 PM      XR CHEST PORTABLE   Final Result   Impression:   Cardiomegaly, no CHF. Small right pleural effusion. This document has been electronically signed by: Jus Varner MD on    03/18/2022 07:27 PM      US RENAL COMPLETE   Final Result       1. Bilateral renal cysts. 2. No definite evidence of hydronephrosis on either side. 3. The bladder was not visualized. .               **This report has been created using voice recognition software. It may contain minor errors which are inherent in voice recognition technology. **      Final report electronically signed by DR Mitchell Cannon on 3/18/2022 11:30 AM      NM LUNG VENT/PERFUSION (VQ)   Final Result   Low probability of pulmonary embolus. Normal ventilation bilaterally with good washout and no areas of trapping. This document has been electronically signed by: Hernan Jones MD on    03/16/2022 05:52 PM      CT CHEST WO CONTRAST   Final Result       1. Borderline cardiomegaly. Calcification in the left anterior descending and right coronary arteries. 2. 13 mm metallic density in the left lower lobe pulmonary artery, new since previous CT scan of the chest dated 24th of May 2021. 3. Small right pleural effusion. 4. Small mediastinal lymph nodes. 5. Thickening off the interstitial lung markings. 6. Thoracic spondylosis. 7. Splenomegaly. 8. Small hiatal hernia. **This report has been created using voice recognition software. It may contain minor errors which are inherent in voice recognition technology. **      Final report electronically signed by DR Mitchell Cannon on 3/16/2022 12:47 PM      XR CHEST PORTABLE   Final Result   1. Mild cardiomegaly and increased pulmonary vascularity. 2. Otherwise negative chest x-ray. .               **This report has been created using voice recognition software.  It may contain minor errors which are inherent in voice recognition

## 2022-03-23 NOTE — PROGRESS NOTES
[facility]   Nephrology progress note  3/23/2022, 9:32 AM      Pt Name:    Delio Irvin  MRN:     [id number]     YOB: 1960  Admit Date:    3/16/2022 10:41 AM  Primary Care Physician:  Zac Bedoya DO   Room number  5K-48/609-M    Chief Complaint: Nephrology following for CAROLE/CKD    Subjective:  Patient seen and examined  This is late entry  Seen and examined earlier today during rounds  No acute distress      Objective:  24HR INTAKE/OUTPUT:      Intake/Output Summary (Last 24 hours) at 3/23/2022 0932  Last data filed at 3/23/2022 0803  Gross per 24 hour   Intake 10 ml   Output --   Net 10 ml     I/O last 3 completed shifts: In: 440 [P.O.:440]  Out: -   I/O this shift:  In: 10 [I.V.:10]  Out: -   Admission weight: (!) 328 lb (148.8 kg)  Wt Readings from Last 3 Encounters:   03/21/22 (!) 335 lb (152 kg)   03/03/22 (!) 333 lb 6.4 oz (151.2 kg)   02/15/22 (!) 338 lb 9.6 oz (153.6 kg)     Body mass index is 44.2 kg/m².     Physical examination  VITALS:     Vitals:    03/23/22 0415 03/23/22 0745 03/23/22 0803 03/23/22 0828   BP: 122/61 132/65     Pulse: 65 58 60    Resp: 17 18     Temp: 97.9 °F (36.6 °C) 98 °F (36.7 °C)     TempSrc: Oral Oral     SpO2:  96%  96%   Weight:       Height:         General Appearance: alert and cooperative with exam, appears comfortable, no distress  Mouth/Throat: Oral mucosa moist  Neck: No JVD  Lungs: Air entry B/L, no rales, no use of accessory muscles  GI: soft,  +obese  Extremities: B/L 2+ LE edema      Lab Data  CBC:   Recent Labs     03/21/22  0516 03/23/22  0523   WBC 5.8 5.7   HGB 7.8* 7.4*   HCT 25.8* 24.8*    268     BMP:  Recent Labs     03/21/22  0516 03/21/22  0516 03/22/22  0541 03/22/22  1738 03/23/22  0523   *  --  136  --  136   K 3.8   < > 3.3* 3.6 3.8   CL 97*  --  101  --  101   CO2 23  --  23  --  23   BUN 37*  --  38*  --  41*   CREATININE 2.6*  --  2.6*  --  2.9*   GLUCOSE 81  --  86  --  100   CALCIUM 8.6  --  8.5 --  8.6   MG  --   --   --   --  1.9    < > = values in this interval not displayed. Hepatic: No results for input(s): LABALBU, AST, ALT, ALB, BILITOT, ALKPHOS in the last 72 hours. Meds:  Infusion:    sodium chloride      dextrose       Meds:    furosemide  40 mg IntraVENous Daily    lidocaine  1 patch TransDERmal Daily    Or    lidocaine  2 patch TransDERmal Daily    Or    lidocaine  3 patch TransDERmal Daily    [START ON 3/26/2022] heparin (porcine)  5,000 Units SubCUTAneous Q8H    ceFAZolin  2,000 mg IntraVENous Q8H    heparin (porcine)  5,000 Units SubCUTAneous 3 times per day    ipratropium-albuterol  1 ampule Inhalation BID    hydrALAZINE  25 mg Oral TID    sodium chloride flush  5-40 mL IntraVENous 2 times per day    [Held by provider] amitriptyline  10 mg Oral Nightly    [Held by provider] aspirin EC  81 mg Oral Daily    [Held by provider] doxepin  50 mg Oral Nightly    budesonide-formoterol  2 puff Inhalation BID    montelukast  10 mg Oral Nightly    levothyroxine  175 mcg Oral Daily    rOPINIRole  1 mg Oral Q24H    oxybutynin  10 mg Oral Daily    rOPINIRole  2 mg Oral Nightly    carvedilol  25 mg Oral BID     Meds prn: ipratropium-albuterol, sodium chloride flush, sodium chloride, ondansetron **OR** ondansetron, polyethylene glycol, acetaminophen **OR** acetaminophen, potassium chloride **OR** potassium alternative oral replacement **OR** potassium chloride, albuterol sulfate HFA, oxyCODONE-acetaminophen, [Held by provider] cyclobenzaprine, glucagon (rDNA), dextrose, glucose, dextrose bolus (hypoglycemia)       Impression and Plan:  1. CAROLE on CKD 3A.   Baseline ~ 1.3  Multiple risk factors for CKD including chronic diastolic heart failure/cardiorenal, volume depletion from diarrhea  Likely has sustained CAROLE from ATN and now more recently due to diuretics but cannot rule out progressive CKD due to underlying diabetic nephropathy and also due to post adaptive FSGS from morbid obesity  Continue to monitor, continue to optimize fluid status  Serum creatinine slightly higher at 2.9 today  Discussed with patient  If continues to rise then will hold Lasix    2. Volume overload. Improved, on IV Lasix  3. Proteinuria secondary to diabetic nephropathy +/-secondary FSGS (post adaptive from obesity)  4. Hypokalemia. Secondary to diuretics, replace as needed. 5.  Abdominal wound. Being followed by surgery. For possible surgical intervention later this week  6. Chronic diastolic dysfunction with volume overload. Plan as above  7. Chronically uncontrolled diabetes mellitus  8. Essential hypertension. Blood pressure readings noted. Continue with Coreg and hydralazine.   Stable    D/W patient     Arlene Webber MD  [facility]

## 2022-03-23 NOTE — PLAN OF CARE
Problem: Impaired respiratory status  Goal: Clear lung sounds  Description: Clear lung sounds  3/23/2022 0843 by Alf Ventura RCP  Outcome: Ongoing  Note: Txs to help improve lung aeration.

## 2022-03-23 NOTE — PLAN OF CARE
Problem: Impaired respiratory status  Goal: Clear lung sounds  Description: Clear lung sounds  Outcome: Ongoing     Continue aerosol therapy to improve aeration of lungs

## 2022-03-23 NOTE — RT PROTOCOL NOTE
RT Inhaler-Nebulizer Bronchodilator Protocol Note    There is a bronchodilator order in the chart from a provider indicating to follow the RT Bronchodilator Protocol and there is an Initiate RT Inhaler-Nebulizer Bronchodilator Protocol order as well (see protocol at bottom of note). CXR Findings:  No results found. The findings from the last RT Protocol Assessment were as follows:   History Pulmonary Disease: None or smoker <15 pack years  Respiratory Pattern: Dyspnea on exertion or RR 21-25 bpm  Breath Sounds: Slightly diminished and/or crackles  Cough: Strong, productive  Indication for Bronchodilator Therapy: Decreased or absent breath sounds  Bronchodilator Assessment Score: 5    Aerosolized bronchodilator medication orders have been revised according to the RT Inhaler-Nebulizer Bronchodilator Protocol below. Respiratory Therapist to perform RT Therapy Protocol Assessment initially then follow the protocol. Repeat RT Therapy Protocol Assessment PRN for score 0-3 or on second treatment, BID, and PRN for scores above 3. No Indications - adjust the frequency to every 6 hours PRN wheezing or bronchospasm, if no treatments needed after 48 hours then discontinue using Per Protocol order mode. If indication present, adjust the RT bronchodilator orders based on the Bronchodilator Assessment Score as indicated below. Use Inhaler orders unless patient has one or more of the following: on home nebulizer, not able to hold breath for 10 seconds, is not alert and oriented, cannot activate and use MDI correctly, or respiratory rate 25 breaths per minute or more, then use the equivalent nebulizer order(s) with same Frequency and PRN reasons based on the score. If a patient is on this medication at home then do not decrease Frequency below that used at home.     0-3 - enter or revise RT bronchodilator order(s) to equivalent RT Bronchodilator order with Frequency of every 4 hours PRN for wheezing or increased work of breathing using Per Protocol order mode. 4-6 - enter or revise RT Bronchodilator order(s) to two equivalent RT bronchodilator orders with one order with BID Frequency and one order with Frequency of every 4 hours PRN wheezing or increased work of breathing using Per Protocol order mode. 7-10 - enter or revise RT Bronchodilator order(s) to two equivalent RT bronchodilator orders with one order with TID Frequency and one order with Frequency of every 4 hours PRN wheezing or increased work of breathing using Per Protocol order mode. 11-13 - enter or revise RT Bronchodilator order(s) to one equivalent RT bronchodilator order with QID Frequency and an Albuterol order with Frequency of every 4 hours PRN wheezing or increased work of breathing using Per Protocol order mode. Greater than 13 - enter or revise RT Bronchodilator order(s) to one equivalent RT bronchodilator order with every 4 hours Frequency and an Albuterol order with Frequency of every 2 hours PRN wheezing or increased work of breathing using Per Protocol order mode. RT to enter RT Home Evaluation for COPD & MDI Assessment order using Per Protocol order mode.     Electronically signed by Jocelyn Lozada RCP on 3/23/2022 at 6:23 PM

## 2022-03-23 NOTE — PLAN OF CARE
Problem: Pain:  Goal: Pain level will decrease  Description: Pain level will decrease  1/73/9325 [id number] by Gopal Calix RN  Outcome: Ongoing   Pain Assessment: 0-10  Pain Level: 8   Patient's Stated Pain Goal: 5   Is pain goal met at this time? No   Patient states pain is tolerable at this time and he is not requesting any pain medication. Will continue to monitor and reassess. Non-Pharmaceutical Pain Intervention(s): Heat applied   Problem: Falls - Risk of:  Goal: Will remain free from falls  Description: Will remain free from falls  4/03/8562 [id number] by Gopal Calix RN  Outcome: Ongoing   All fall precautions in place. Bed in low position, alarm activated and appropriate use of call light. Problem: Skin Integrity:  Goal: Absence of new skin breakdown  Description: Absence of new skin breakdown  4/39/0514 [id number] by Gopal Calix RN  Outcome: Ongoing   Skin assessment completed. Patient turned every 2 hours and as needed. No skin breakdown this shift. Problem: Discharge Planning:  Goal: Discharged to appropriate level of care  Description: Discharged to appropriate level of care  8/57/1487 5795 by Gopal Calxi RN  Outcome: Ongoing   Discharge date is unclear, plan is to return to home. Care plan reviewed with patient. Patient verbalizes understanding of the plan of care and contributes to goal setting.

## 2022-03-24 LAB
ANION GAP SERPL CALCULATED.3IONS-SCNC: 14 MEQ/L (ref 8–16)
BASOPHILS # BLD: 0.7 %
BASOPHILS ABSOLUTE: 0 THOU/MM3 (ref 0–0.1)
BUN BLDV-MCNC: 42 MG/DL (ref 7–22)
CALCIUM SERPL-MCNC: 8.8 MG/DL (ref 8.5–10.5)
CHLORIDE BLD-SCNC: 99 MEQ/L (ref 98–111)
CO2: 25 MEQ/L (ref 23–33)
CREAT SERPL-MCNC: 2.5 MG/DL (ref 0.4–1.2)
EOSINOPHIL # BLD: 4.9 %
EOSINOPHILS ABSOLUTE: 0.3 THOU/MM3 (ref 0–0.4)
ERYTHROCYTE [DISTWIDTH] IN BLOOD BY AUTOMATED COUNT: 18.3 % (ref 11.5–14.5)
ERYTHROCYTE [DISTWIDTH] IN BLOOD BY AUTOMATED COUNT: 53.7 FL (ref 35–45)
GFR SERPL CREATININE-BSD FRML MDRD: 27 ML/MIN/1.73M2
GLUCOSE BLD-MCNC: 88 MG/DL (ref 70–108)
HCT VFR BLD CALC: 27.4 % (ref 42–52)
HEMOGLOBIN: 8.3 GM/DL (ref 14–18)
IMMATURE GRANS (ABS): 0.17 THOU/MM3 (ref 0–0.07)
IMMATURE GRANULOCYTES: 3.2 %
LYMPHOCYTES # BLD: 14.4 %
LYMPHOCYTES ABSOLUTE: 0.8 THOU/MM3 (ref 1–4.8)
MCH RBC QN AUTO: 26 PG (ref 26–33)
MCHC RBC AUTO-ENTMCNC: 30.3 GM/DL (ref 32.2–35.5)
MCV RBC AUTO: 85.9 FL (ref 80–94)
MONOCYTES # BLD: 7.7 %
MONOCYTES ABSOLUTE: 0.4 THOU/MM3 (ref 0.4–1.3)
NUCLEATED RED BLOOD CELLS: 0 /100 WBC
PLATELET # BLD: 291 THOU/MM3 (ref 130–400)
PMV BLD AUTO: 8.4 FL (ref 9.4–12.4)
POTASSIUM SERPL-SCNC: 3.7 MEQ/L (ref 3.5–5.2)
RBC # BLD: 3.19 MILL/MM3 (ref 4.7–6.1)
SEG NEUTROPHILS: 69.1 %
SEGMENTED NEUTROPHILS ABSOLUTE COUNT: 3.7 THOU/MM3 (ref 1.8–7.7)
SODIUM BLD-SCNC: 138 MEQ/L (ref 135–145)
WBC # BLD: 5.3 THOU/MM3 (ref 4.8–10.8)

## 2022-03-24 PROCEDURE — 94640 AIRWAY INHALATION TREATMENT: CPT

## 2022-03-24 PROCEDURE — 6370000000 HC RX 637 (ALT 250 FOR IP): Performed by: INTERNAL MEDICINE

## 2022-03-24 PROCEDURE — 6370000000 HC RX 637 (ALT 250 FOR IP): Performed by: STUDENT IN AN ORGANIZED HEALTH CARE EDUCATION/TRAINING PROGRAM

## 2022-03-24 PROCEDURE — 99232 SBSQ HOSP IP/OBS MODERATE 35: CPT | Performed by: INTERNAL MEDICINE

## 2022-03-24 PROCEDURE — 36415 COLL VENOUS BLD VENIPUNCTURE: CPT

## 2022-03-24 PROCEDURE — 6360000002 HC RX W HCPCS: Performed by: INTERNAL MEDICINE

## 2022-03-24 PROCEDURE — 80048 BASIC METABOLIC PNL TOTAL CA: CPT

## 2022-03-24 PROCEDURE — 99232 SBSQ HOSP IP/OBS MODERATE 35: CPT | Performed by: SURGERY

## 2022-03-24 PROCEDURE — 85025 COMPLETE CBC W/AUTO DIFF WBC: CPT

## 2022-03-24 PROCEDURE — 6360000002 HC RX W HCPCS: Performed by: STUDENT IN AN ORGANIZED HEALTH CARE EDUCATION/TRAINING PROGRAM

## 2022-03-24 PROCEDURE — APPSS30 APP SPLIT SHARED TIME 16-30 MINUTES: Performed by: NURSE PRACTITIONER

## 2022-03-24 PROCEDURE — 1200000003 HC TELEMETRY R&B

## 2022-03-24 PROCEDURE — 2580000003 HC RX 258: Performed by: STUDENT IN AN ORGANIZED HEALTH CARE EDUCATION/TRAINING PROGRAM

## 2022-03-24 PROCEDURE — 6360000002 HC RX W HCPCS: Performed by: SURGERY

## 2022-03-24 PROCEDURE — 94760 N-INVAS EAR/PLS OXIMETRY 1: CPT

## 2022-03-24 RX ORDER — SODIUM CHLORIDE 9 MG/ML
INJECTION, SOLUTION INTRAVENOUS CONTINUOUS
Status: DISCONTINUED | OUTPATIENT
Start: 2022-03-25 | End: 2022-03-26

## 2022-03-24 RX ORDER — LANOLIN ALCOHOL/MO/W.PET/CERES
5 CREAM (GRAM) TOPICAL NIGHTLY PRN
Status: DISCONTINUED | OUTPATIENT
Start: 2022-03-24 | End: 2022-03-30 | Stop reason: HOSPADM

## 2022-03-24 RX ADMIN — BUDESONIDE AND FORMOTEROL FUMARATE DIHYDRATE 2 PUFF: 160; 4.5 AEROSOL RESPIRATORY (INHALATION) at 08:30

## 2022-03-24 RX ADMIN — HYDRALAZINE HYDROCHLORIDE 25 MG: 25 TABLET, FILM COATED ORAL at 19:41

## 2022-03-24 RX ADMIN — SODIUM CHLORIDE, PRESERVATIVE FREE 10 ML: 5 INJECTION INTRAVENOUS at 19:43

## 2022-03-24 RX ADMIN — Medication 2000 MG: at 19:42

## 2022-03-24 RX ADMIN — MONTELUKAST SODIUM 10 MG: 10 TABLET ORAL at 19:41

## 2022-03-24 RX ADMIN — IPRATROPIUM BROMIDE AND ALBUTEROL SULFATE 1 AMPULE: .5; 3 SOLUTION RESPIRATORY (INHALATION) at 18:16

## 2022-03-24 RX ADMIN — ROPINIROLE HYDROCHLORIDE 1 MG: 1 TABLET, FILM COATED ORAL at 13:53

## 2022-03-24 RX ADMIN — LEVOTHYROXINE SODIUM 175 MCG: 0.15 TABLET ORAL at 05:38

## 2022-03-24 RX ADMIN — OXYBUTYNIN CHLORIDE 10 MG: 10 TABLET, EXTENDED RELEASE ORAL at 08:50

## 2022-03-24 RX ADMIN — SODIUM CHLORIDE, PRESERVATIVE FREE 10 ML: 5 INJECTION INTRAVENOUS at 08:50

## 2022-03-24 RX ADMIN — HEPARIN SODIUM 5000 UNITS: 5000 INJECTION INTRAVENOUS; SUBCUTANEOUS at 13:53

## 2022-03-24 RX ADMIN — ROPINIROLE HYDROCHLORIDE 2 MG: 1 TABLET, FILM COATED ORAL at 19:41

## 2022-03-24 RX ADMIN — Medication 2000 MG: at 11:13

## 2022-03-24 RX ADMIN — IPRATROPIUM BROMIDE AND ALBUTEROL SULFATE 1 AMPULE: .5; 3 SOLUTION RESPIRATORY (INHALATION) at 08:29

## 2022-03-24 RX ADMIN — CARVEDILOL 25 MG: 25 TABLET, FILM COATED ORAL at 18:30

## 2022-03-24 RX ADMIN — Medication 4.5 MG: at 21:11

## 2022-03-24 RX ADMIN — FUROSEMIDE 40 MG: 10 INJECTION, SOLUTION INTRAMUSCULAR; INTRAVENOUS at 08:52

## 2022-03-24 RX ADMIN — Medication 2000 MG: at 03:59

## 2022-03-24 RX ADMIN — HEPARIN SODIUM 5000 UNITS: 5000 INJECTION INTRAVENOUS; SUBCUTANEOUS at 05:38

## 2022-03-24 RX ADMIN — CARVEDILOL 25 MG: 25 TABLET, FILM COATED ORAL at 08:50

## 2022-03-24 RX ADMIN — ONDANSETRON 4 MG: 4 TABLET, ORALLY DISINTEGRATING ORAL at 11:21

## 2022-03-24 RX ADMIN — ONDANSETRON 4 MG: 2 INJECTION INTRAMUSCULAR; INTRAVENOUS at 02:15

## 2022-03-24 RX ADMIN — ONDANSETRON 4 MG: 4 TABLET, ORALLY DISINTEGRATING ORAL at 21:17

## 2022-03-24 RX ADMIN — HYDRALAZINE HYDROCHLORIDE 25 MG: 25 TABLET, FILM COATED ORAL at 08:50

## 2022-03-24 RX ADMIN — OXYCODONE AND ACETAMINOPHEN 1 TABLET: 10; 325 TABLET ORAL at 21:11

## 2022-03-24 RX ADMIN — BUDESONIDE AND FORMOTEROL FUMARATE DIHYDRATE 2 PUFF: 160; 4.5 AEROSOL RESPIRATORY (INHALATION) at 18:16

## 2022-03-24 RX ADMIN — HYDRALAZINE HYDROCHLORIDE 25 MG: 25 TABLET, FILM COATED ORAL at 13:53

## 2022-03-24 ASSESSMENT — PAIN DESCRIPTION - FREQUENCY: FREQUENCY: CONTINUOUS

## 2022-03-24 ASSESSMENT — PAIN DESCRIPTION - PAIN TYPE: TYPE: CHRONIC PAIN

## 2022-03-24 ASSESSMENT — PAIN DESCRIPTION - LOCATION: LOCATION: BACK

## 2022-03-24 ASSESSMENT — PAIN DESCRIPTION - ONSET: ONSET: ON-GOING

## 2022-03-24 ASSESSMENT — PAIN SCALES - GENERAL
PAINLEVEL_OUTOF10: 7
PAINLEVEL_OUTOF10: 6
PAINLEVEL_OUTOF10: 7

## 2022-03-24 ASSESSMENT — PAIN DESCRIPTION - DESCRIPTORS: DESCRIPTORS: ACHING

## 2022-03-24 ASSESSMENT — PAIN DESCRIPTION - ORIENTATION: ORIENTATION: LOWER

## 2022-03-24 NOTE — PROGRESS NOTES
[facility]   Nephrology progress note  3/24/2022, 11:50 AM      Pt Name:    Angelica Billingsley  MRN:     [id number]     YOB: 1960  Admit Date:    3/16/2022 10:41 AM  Primary Care Physician:  Jose De Jesus Hammer DO   Room number  5E-33/663-P    Chief Complaint: Nephrology following for CAROLE/CKD    Subjective:  Patient seen and examined  Seen earlier today during rounds  No acute distress  Awaiting surgery tomorrow  No chest pain  No shortness of breath      Objective:  24HR INTAKE/OUTPUT:      Intake/Output Summary (Last 24 hours) at 3/24/2022 1150  Last data filed at 3/24/2022 1137  Gross per 24 hour   Intake 1000 ml   Output 3050 ml   Net -2050 ml     I/O last 3 completed shifts: In: 1250 [P.O.:1240; I.V.:10]  Out: 1950 [Urine:1950]  I/O this shift:  In: -   Out: 1700 [Urine:1700]  Admission weight: (!) 328 lb (148.8 kg)  Wt Readings from Last 3 Encounters:   03/24/22 (!) 317 lb 10.9 oz (144.1 kg)   03/03/22 (!) 333 lb 6.4 oz (151.2 kg)   02/15/22 (!) 338 lb 9.6 oz (153.6 kg)     Body mass index is 41.91 kg/m².     Physical examination  VITALS:     Vitals:    03/24/22 0345 03/24/22 0530 03/24/22 0846 03/24/22 1135   BP: (!) 145/68  (!) 158/82 138/71   Pulse: 63  64 64   Resp: 16  16 16   Temp: 98.4 °F (36.9 °C)  97.5 °F (36.4 °C) 97.5 °F (36.4 °C)   TempSrc: Oral  Oral Oral   SpO2: 94%  99% 94%   Weight:  (!) 317 lb 10.9 oz (144.1 kg)     Height:         General Appearance: alert and cooperative with exam, appears comfortable, no distress  Mouth/Throat: Oral mucosa moist  GI: soft,  +obese  Extremities: B/L 2+ LE edema, improved      Lab Data  CBC:   Recent Labs     03/23/22  0523 03/24/22  0939   WBC 5.7 5.3   HGB 7.4* 8.3*   HCT 24.8* 27.4*    291     BMP:  Recent Labs     03/22/22  0541 03/22/22  1738 03/23/22  0523 03/24/22  0515     --  136 138   K 3.3* 3.6 3.8 3.7     --  101 99   CO2 23  --  23 25   BUN 38*  --  41* 42*   CREATININE 2.6*  --  2.9* 2.5*   GLUCOSE 86  --  100 88   CALCIUM 8.5  --  8.6 8.8   MG  --   --  1.9  --      Hepatic: No results for input(s): LABALBU, AST, ALT, ALB, BILITOT, ALKPHOS in the last 72 hours. Meds:  Infusion:    [START ON 3/25/2022] sodium chloride      sodium chloride      dextrose       Meds:    furosemide  40 mg IntraVENous Daily    lidocaine  1 patch TransDERmal Daily    Or    lidocaine  2 patch TransDERmal Daily    Or    lidocaine  3 patch TransDERmal Daily    [START ON 3/26/2022] heparin (porcine)  5,000 Units SubCUTAneous Q8H    ceFAZolin  2,000 mg IntraVENous Q8H    heparin (porcine)  5,000 Units SubCUTAneous 3 times per day    ipratropium-albuterol  1 ampule Inhalation BID    hydrALAZINE  25 mg Oral TID    sodium chloride flush  5-40 mL IntraVENous 2 times per day    [Held by provider] amitriptyline  10 mg Oral Nightly    [Held by provider] aspirin EC  81 mg Oral Daily    [Held by provider] doxepin  50 mg Oral Nightly    budesonide-formoterol  2 puff Inhalation BID    montelukast  10 mg Oral Nightly    levothyroxine  175 mcg Oral Daily    rOPINIRole  1 mg Oral Q24H    oxybutynin  10 mg Oral Daily    rOPINIRole  2 mg Oral Nightly    carvedilol  25 mg Oral BID     Meds prn: melatonin, ipratropium-albuterol, sodium chloride flush, sodium chloride, ondansetron **OR** ondansetron, polyethylene glycol, acetaminophen **OR** acetaminophen, potassium chloride **OR** potassium alternative oral replacement **OR** potassium chloride, albuterol sulfate HFA, oxyCODONE-acetaminophen, [Held by provider] cyclobenzaprine, glucagon (rDNA), dextrose, glucose, dextrose bolus (hypoglycemia)       Impression and Plan:  1. CAROLE on CKD 3A.   Baseline ~ 1.3  Multiple risk factors for CKD including chronic diastolic heart failure/cardiorenal, volume depletion from diarrhea  Likely has sustained CAROLE from ATN, underlying CKD due to diabetic nephropathy and also due to post adaptive FSGS from morbid obesity  Continue to monitor, continue to optimize fluid status  Serum creatinine better at 2.5 today, diuretics adjusted yesterday    2. Volume overload. Improved  3. Proteinuria secondary to diabetic nephropathy +/-secondary FSGS (post adaptive from obesity)  4. Hypokalemia. Secondary to diuretics, replace as needed. 5.  Abdominal wound. Being followed by surgery. For surgical intervention tomorrow. 6.  Chronic diastolic dysfunction with volume overload. Plan as above  7. Chronically uncontrolled diabetes mellitus  8. Essential hypertension. Blood pressure readings noted. Continue with Coreg and hydralazine.   Stable    D/W patient     Erica Titus MD  [facility]

## 2022-03-24 NOTE — PROGRESS NOTES
Comprehensive Nutrition Assessment    Type and Reason for Visit:  RD Nutrition Re-Screen/LOS    Nutrition Recommendations/Plan:   *Recommend a Multivitamin w/minerals daily. *ONS: Initiated Will BID and Glucerna daily. *Continue current diet. Nutrition Assessment: Pt. nutritionally compromised AEB wounds. At risk for further nutrition compromise r/t increased nutrient needs for wound healing, admit with sepsis 2/2 abdominal wound and underlying medical condition (Hx: HTN, DM, CHF, CAROLE on CKD, Bladder Disease, Iron Deficiency Anemia). Malnutrition Assessment:  Malnutrition Status:  No malnutrition    Context:  Acute Illness     Findings of the 6 clinical characteristics of malnutrition:  Energy Intake:  No significant decrease in energy intake  Weight Loss:  Unable to assess (difficult to assess d/t CHF/fluids shifts noted)     Body Fat Loss:  No significant body fat loss     Muscle Mass Loss:  No significant muscle mass loss    Fluid Accumulation:  Unable to assess (difficult to assess d/t CHF/fluids shifts noted) Extremities   Strength:  Not Performed    Estimated Daily Nutrient Needs:  Energy (kcal):  9916-7635 kcal/day (10-12 kcal/kg); Weight Used for Energy Requirements:   (144 kg)     Protein (g):   g/day (monitoring renal status); Weight Used for Protein Requirements:   (IBW 83.6 kg)         Nutrition Related Findings:   Pt. Report/Treatments/Miscellaneous: Admit w/sepsis 2/2 abdominal wound- plan surgery on 3/25. Pt seen- he reports good appetite and intake of meals x8 days since admit consuming lean protein sources with meals; pt states he drinks Glucerna BID at home; pt is amenable to Will BID and Glucerna daily. GI Status: Pt reports some nausea in the evening but states that the Zofran helps. Pertinent Labs: (3/24/22): BUN 42, Cr. 1.5. 5/24/21: HgA1C 9.3%/average glucose 219.    Pertinent Meds: Zofran PRN, Antibiotic, Lasix    Wounds:  Surgical Incision (abdominal wound from dehiscence of hernia repair in 2018 with surgical mesh repair; plan OR on 3/25)       Current Nutrition Therapies:    ADULT DIET; Regular; 4 carb choices (60 gm/meal); Low Sodium (2 gm)  Diet NPO  ADULT ORAL NUTRITION SUPPLEMENT; Lunch, Dinner; Wound Healing Oral Supplement  ADULT ORAL NUTRITION SUPPLEMENT; Dinner; Diabetic Oral Supplement    Anthropometric Measures:  · Height: 6' 1\" (185.4 cm)  · Current Body Weight: 317 lb 10.9 oz (144.1 kg) (3/24; +2 pitting edema BLE)   · Admission Body Weight: 334 lb (151.5 kg) (3/17; +2 pitting edema BLE)    · Usual Body Weight:  (Per EMR: 338 lb 9.6 oz on 2/15/22; 350 lb 3.2 oz on 9/9/21; 355 lb 12.8 ozon 5/3/21)     · Ideal Body Weight: 184 lbs  · BMI: 41.9  · BMI Categories: Obese Class 3 (BMI 40.0 or greater)       Nutrition Diagnosis:   · Increased nutrient needs related to increase demand for energy/nutrients as evidenced by wounds    Nutrition Interventions:   Food and/or Nutrient Delivery:  Continue Current Diet,Start Oral Nutrition Supplement,Vitamin Supplement  Nutrition Education/Counseling:  Education initiated (Encouraged po intake of meals and ONS at best effort during LOS)   Coordination of Nutrition Care:  Continue to monitor while inpatient    Goals:  Pt will consume 75% or more of meals during LOS to aid in wound healing       Nutrition Monitoring and Evaluation:   Behavioral-Environmental Outcomes:  None Identified   Food/Nutrient Intake Outcomes:  Diet Advancement/Tolerance,Food and Nutrient Intake,Supplement Intake,Vitamin/Mineral Intake  Physical Signs/Symptoms Outcomes:  Biochemical Data,Weight,Skin,Nutrition Focused Physical Findings,Fluid Status or Edema     Discharge Planning:     Too soon to determine     Electronically signed by Herold Habermann, MS, RD, LD on 3/24/22 at 2:46 PM EDT    Contact: [phone]

## 2022-03-24 NOTE — CARE COORDINATION
3/24/22, 2:43 PM EDT    DISCHARGE ON [place] day: 8  Location: [facility]24/024-A Reason for admit: [facility] [N17.9]   Procedure:   3/19 CT Abd/Pelvis: Gas and fluid external to the abdomen and near the umbilicus along the patient's hernia mesh level. No evidence of intra-abdominal abscess. Barriers to Discharge: Hospitalist, Cardiology, Nephrology and Surgery following. Planning OR tomorrow (3/25/22) for excision of infected mesh. Ancef iv q8hr, Lasix 40mg iv daily, Heparin subq q8hr, DuoNeb bid and prn. BUN 42, creatinine 2.5. Hgb 8.3. PCP: Bob Christiansen DO  Readmission Risk Score: 18.4 ( )%  Patient Goals/Plan/Treatment Preferences: Plans home with spouse. Has BiPAP at home with O2 bleed in at 1L/nc. Current with HF and Wound clinic. Monitor for needs.

## 2022-03-24 NOTE — PROGRESS NOTES
Hospitalist Progress Note    Patient: Lizet Myles  Unit/Bed: 6A-47/143-D  YOB: 1960  MRN: [id number]  Acct: [de-identified]    PCP: Luis F Mcnamara DO    Date of Admission: 3/16/2022    Assessment/Plan:    Sepsis secondary to abdominal wound - Resolving. present on arrival. SIRS 2/4 (Tachycardia and tachypnea). Abdominal wound is from dehiscence of hernia repair in 2018 with surgical mesh exposure, now with purulent discharge. Patient received 1 dose Ceftriaxone within the ED. 30cc/kg fluid bolus completed. CT abdomen without abscess. - MRSA nares negative, Blood Cx NGTD, Wound Cx MSSA and corynebacterium species  - Continue Cefazolin  - Surgery planned for 3/25/22  - NPO at midnight and stop SQ heparin at midnight    Stage 1 CAROLE on CKD 3a with renal progression to 3b - Improving. Renal progression appears to have occurred around 2/2022 and likely medication related with adjustments to home Bumex regimen. This may be his new baseline. Mild worsening of CAROLE during admission is to be expected with use of diuretic.  - Avoid nephrotoxic agents and renally dose medications  - Daily standing weights, strict I/O  - Daily BMP  - Nephrology following    Elevated microalbumin/Creatinine ratio - 1060mg/g. Likely secondary to hypertension and diabetes. - Nephrology following    Chronic systolic heart failure NYHA II - Without exacerbation. EF=55-60% on ECHO 5/25/21. Implanted CardioMEMS 6/16/2021. [address] 12/1/2020 demonstrating elevated LVEDP. Patient follows with CHF clinic. CardioMEMS download completed 3/22/22 with only mild elevation from baseline.  - Patient not currently taking Entresto, spironolactone, carvedilol or Bumex per chart review and recommendations of outside provider per patient  - Lasix 40mg daily  - Cardiology following     COPD GOLD 2 - Without exacerbation. PFT 3/15/2021 FEV1= 75% of predicted.   - Continue home Montelukast, Symbicort  - Duonebs twice daily  - Continue Acapella and IS     NIDDM2 - Uncontrolled. A1c=9.9 (5/24/21)  - high dose ssi, POCT glucose 4x daily AC/HS, Hypoglycemia protocol  - Inpatient BG goal 140-180; tight glucose control will be imperative in the laci and post-operative state for adequate healing.     Primary HTN - Currently normotensive. Patient is not taking home hydralazine per recommendations of outside provider.  - Continue hydralazine to 25mg three times daily    Iron deficiency anemia - Iron 40, Ferritin 423, Iron sat 20%. - Following with nephrology; EPO at their discretion    Elevated troponin - Secondary to type II demand ischemia. Patient denies chest pain, EKG without ST or T-wave abnormalities. - No indication to trend    LAURIE - CPAP at home  - Continue home CPAP     BPH - Noted  - Holding Tamsulosin for CAROLE     Hx of hypothyroidism - Noted  - Continue Synthroid     Hx of chronic back pain - Noted  - Resume home percocet  - Hold Flexeril    High anion gap metabolic acidosis - Resolved. Secondary to sepsis and elevated lactic acid. ?Pulmonary embolism - Resolved. Pulmonary embolism was considered within the ED, but CTA not obtainable due to worsening renal function. V/Q scan demonstrated low probability of pulmonary embolism. Moderate Hypokalemia - Resolved. Secondary to diuretic use. Patient also received 80mg Lasix within the ED. Magnesium and phosphorus within normal limits  - Continue potassium replacement as needed  - Continuous telemetry    Acute hypoxic respiratory failure - Resolved. Weaned to room air. Likely secondary to fluid overload.  Negative viral respiratory panel 3/19/22.  - Continue Duoneb twice daily      Expected discharge date:  TBD    Disposition:    [x] Home       [] TCU       [] Rehab       [] Psych       [] SNF       [] [place]       [] Other -    Chief Complaint: Fever    Hospital Course:  Reva Peterson is a 63 y.o. male with PMHx of HTN, CHF, BPH, LAURIE, hypothyroidism, chronic back pain, CKD, and NIDDM2 who presented to [facility] with complaint of fever that began approximately 5 days prior to presentation that was self-reported as high as 101F. The patient had associated symptoms of diaphoresis. He noted that he also has increased cough and shortness of breath from his baseline during these complaints. He has been taking Ibuprofen for his fever and OTC cough medicine for his cough which he states helps. The patient reported that his shortness of breath as well as his cough have been occurring for the last 4 months and have not worsened over that time period. He also stated that he is recently been instructed to hold his Bumex as well as his Entresto from his nephrologist and CHF clinic respectively. He denies headache, lightheadedness, change in vision, rhinorrhea, congestion, sore throat, cough, hemoptysis, chest pain, palpitations, abdominal pain, nausea, vomiting, hematemesis, change in bowel/bladder habits, hematochezia, hematuria, weakness, difficulty with ambulation, numbness/tingling, or known exposure to sick contacts. He admits to fever, chills, AKINS, PND, and shortness of breath. Within the emergency department the patient was administered Lasix 80mg, ASA 324mg, 1 dose of Ceftriaxone 1g, and 4mg morphine, He also underwent CT of the chest which demonstrated borderline cardiomegaly, calcification of the LAD and RCA coronary arteries, small right pleural effusion and splenomegaly. The patient was admitted to the hospital service for further care and management. Orders were placed for general surgery consult. Subjective (past 24 hours): Per nursing, there were no acute events overnight. The patient stated that his back pain is well controlled with lidocaine patches and heating pad. He is eager for surgery tomorrow and has no concerns or complaints. Review of Systems: 12 point review of systems completed and pertinent positives are noted in the HPI.  All other systems reviewed and negative. Medications:  Reviewed    Infusion Medications    [START ON 3/25/2022] sodium chloride      sodium chloride      dextrose       Scheduled Medications    furosemide  40 mg IntraVENous Daily    lidocaine  1 patch TransDERmal Daily    Or    lidocaine  2 patch TransDERmal Daily    Or    lidocaine  3 patch TransDERmal Daily    [START ON 3/26/2022] heparin (porcine)  5,000 Units SubCUTAneous Q8H    ceFAZolin  2,000 mg IntraVENous Q8H    heparin (porcine)  5,000 Units SubCUTAneous 3 times per day    ipratropium-albuterol  1 ampule Inhalation BID    hydrALAZINE  25 mg Oral TID    sodium chloride flush  5-40 mL IntraVENous 2 times per day    [Held by provider] amitriptyline  10 mg Oral Nightly    [Held by provider] aspirin EC  81 mg Oral Daily    [Held by provider] doxepin  50 mg Oral Nightly    budesonide-formoterol  2 puff Inhalation BID    montelukast  10 mg Oral Nightly    levothyroxine  175 mcg Oral Daily    rOPINIRole  1 mg Oral Q24H    oxybutynin  10 mg Oral Daily    rOPINIRole  2 mg Oral Nightly    carvedilol  25 mg Oral BID     PRN Meds: melatonin, ipratropium-albuterol, sodium chloride flush, sodium chloride, ondansetron **OR** ondansetron, polyethylene glycol, acetaminophen **OR** acetaminophen, potassium chloride **OR** potassium alternative oral replacement **OR** potassium chloride, albuterol sulfate HFA, oxyCODONE-acetaminophen, [Held by provider] cyclobenzaprine, glucagon (rDNA), dextrose, glucose, dextrose bolus (hypoglycemia)      Intake/Output Summary (Last 24 hours) at 3/24/2022 1122  Last data filed at 3/24/2022 1109  Gross per 24 hour   Intake 1000 ml   Output 2750 ml   Net -1750 ml       Diet:  ADULT DIET; Regular; 4 carb choices (60 gm/meal);  Low Sodium (2 gm)  Diet NPO    Exam:  BP (!) 158/82   Pulse 64   Temp 97.5 °F (36.4 °C) (Oral)   Resp 16   Ht 6' 1\" (1.854 m)   Wt (!) 317 lb 10.9 oz (144.1 kg)   SpO2 99%   BMI 41.91 kg/m²     Physical Exam  Vitals and nursing note reviewed. Constitutional:       General: He is awake. Appearance: Normal appearance. He is morbidly obese. Interventions: Nasal cannula in place. HENT:      Head: Normocephalic and atraumatic. Right Ear: External ear normal.      Left Ear: External ear normal.      Nose: Nose normal.      Mouth/Throat:      Mouth: Mucous membranes are moist.      Pharynx: Oropharynx is clear. Eyes:      Conjunctiva/sclera: Conjunctivae normal.      Pupils: Pupils are equal, round, and reactive to light. Cardiovascular:      Rate and Rhythm: Normal rate and regular rhythm. Pulses: Normal pulses. Dorsalis pedis pulses are 2+ on the right side and 2+ on the left side. Posterior tibial pulses are 2+ on the right side and 2+ on the left side. Heart sounds: Normal heart sounds. Pulmonary:      Effort: Pulmonary effort is normal.      Breath sounds: Normal breath sounds. Decreased air movement present. No wheezing. Abdominal:      General: A surgical scar is present. Bowel sounds are normal.      Palpations: Abdomen is soft. Tenderness: There is no abdominal tenderness. Musculoskeletal:         General: Normal range of motion. Cervical back: Normal range of motion and neck supple. Right lower le+ Pitting Edema present. Left lower le+ Pitting Edema present. Skin:     General: Skin is warm and dry. Capillary Refill: Capillary refill takes less than 2 seconds. Neurological:      General: No focal deficit present. Mental Status: He is alert and oriented to person, place, and time. Mental status is at baseline. GCS: GCS eye subscore is 4. GCS verbal subscore is 5. GCS motor subscore is 6. Psychiatric:         Attention and Perception: Attention and perception normal.         Mood and Affect: Mood and affect normal.         Speech: Speech normal.         Behavior: Behavior normal. Behavior is cooperative.          Thought Content: Thought content normal.         Cognition and Memory: Cognition and memory normal.         Judgment: Judgment normal.         Labs:  Recent Labs     03/23/22  0523 03/24/22  0939   WBC 5.7 5.3   HGB 7.4* 8.3*   HCT 24.8* 27.4*    291     Recent Labs     03/22/22  0541 03/22/22  1738 03/23/22  0523 03/24/22  0515     --  136 138   K 3.3* 3.6 3.8 3.7     --  101 99   CO2 23  --  23 25   BUN 38*  --  41* 42*   CREATININE 2.6*  --  2.9* 2.5*   CALCIUM 8.5  --  8.6 8.8     No results for input(s): AST, ALT, BILIDIR, BILITOT, ALKPHOS in the last 72 hours. No results for input(s): INR in the last 72 hours. No results for input(s): [place] Jess[place] in the last 72 hours. Microbiology:    Urinalysis:   Lab Results   Component Value Date    NITRU NEGATIVE 03/23/2022    WBCUA 5-9 03/23/2022    BACTERIA NONE SEEN 03/23/2022    RBCUA 25-50 03/23/2022    BLOODU MODERATE 03/23/2022    SPECGRAV 1.015 03/23/2022    GLUCOSEU 500 04/13/2021       Radiology:  CT ABDOMEN PELVIS WO CONTRAST Additional Contrast? None   Final Result   Gas and fluid external to the abdomen and near the umbilicus along the patient's hernia mesh level. No evidence of intra-abdominal abscess. No acute abdominal or pelvic abnormalities. Multiple chronic findings. Stable appearance of the lung bases. **This report has been created using voice recognition software. It may contain minor errors which are inherent in voice recognition technology. **      Final report electronically signed by Dr. Darya Adames on 3/19/2022 6:32 PM      XR CHEST PORTABLE   Final Result   Impression:   Cardiomegaly, no CHF. Small right pleural effusion. This document has been electronically signed by: Jus Varner MD on    03/18/2022 07:27 PM      US RENAL COMPLETE   Final Result       1. Bilateral renal cysts. 2. No definite evidence of hydronephrosis on either side. 3. The bladder was not visualized. Rometta Favre **This report has been created using voice recognition software. It may contain minor errors which are inherent in voice recognition technology. **      Final report electronically signed by DR Rodo Miguel on 3/18/2022 11:30 AM      NM LUNG VENT/PERFUSION (VQ)   Final Result   Low probability of pulmonary embolus. Normal ventilation bilaterally with good washout and no areas of trapping. This document has been electronically signed by: Taz Ramirez MD on    03/16/2022 05:52 PM      CT CHEST WO CONTRAST   Final Result       1. Borderline cardiomegaly. Calcification in the left anterior descending and right coronary arteries. 2. 13 mm metallic density in the left lower lobe pulmonary artery, new since previous CT scan of the chest dated 24th of May 2021. 3. Small right pleural effusion. 4. Small mediastinal lymph nodes. 5. Thickening off the interstitial lung markings. 6. Thoracic spondylosis. 7. Splenomegaly. 8. Small hiatal hernia. **This report has been created using voice recognition software. It may contain minor errors which are inherent in voice recognition technology. **      Final report electronically signed by DR Rodo Miguel on 3/16/2022 12:47 PM      XR CHEST PORTABLE   Final Result   1. Mild cardiomegaly and increased pulmonary vascularity. 2. Otherwise negative chest x-ray. .               **This report has been created using voice recognition software. It may contain minor errors which are inherent in voice recognition technology. **      Final report electronically signed by DR Rodo Miguel on 3/16/2022 11:53 AM          DVT prophylaxis:  [] Lovenox  [] SCDs  [x] SQ Heparin  [] Encourage ambulation  [] Already on anticoagulation  [] Other -      Code Status: Full Code    PT/OT Evaluation Status: N/A    Telemetry:  [x] Yes  [] No    Electronically signed by Salvatore Vargas DO, MBA on 3/24/2022 at 11:22 AM

## 2022-03-24 NOTE — RT PROTOCOL NOTE
RT Inhaler-Nebulizer Bronchodilator Protocol Note    There is a bronchodilator order in the chart from a provider indicating to follow the RT Bronchodilator Protocol and there is an Initiate RT Inhaler-Nebulizer Bronchodilator Protocol order as well (see protocol at bottom of note). CXR Findings:  No results found. The findings from the last RT Protocol Assessment were as follows:   History Pulmonary Disease: None or smoker <15 pack years  Respiratory Pattern: Regular pattern and RR 12-20 bpm  Breath Sounds: Slightly diminished and/or crackles  Cough: Strong, spontaneous, non-productive  Indication for Bronchodilator Therapy: On home bronchodilators  Bronchodilator Assessment Score: 2    Aerosolized bronchodilator medication orders have been revised according to the RT Inhaler-Nebulizer Bronchodilator Protocol below. Respiratory Therapist to perform RT Therapy Protocol Assessment initially then follow the protocol. Repeat RT Therapy Protocol Assessment PRN for score 0-3 or on second treatment, BID, and PRN for scores above 3. No Indications - adjust the frequency to every 6 hours PRN wheezing or bronchospasm, if no treatments needed after 48 hours then discontinue using Per Protocol order mode. If indication present, adjust the RT bronchodilator orders based on the Bronchodilator Assessment Score as indicated below. Use Inhaler orders unless patient has one or more of the following: on home nebulizer, not able to hold breath for 10 seconds, is not alert and oriented, cannot activate and use MDI correctly, or respiratory rate 25 breaths per minute or more, then use the equivalent nebulizer order(s) with same Frequency and PRN reasons based on the score. If a patient is on this medication at home then do not decrease Frequency below that used at home.     0-3 - enter or revise RT bronchodilator order(s) to equivalent RT Bronchodilator order with Frequency of every 4 hours PRN for wheezing or increased work of breathing using Per Protocol order mode. 4-6 - enter or revise RT Bronchodilator order(s) to two equivalent RT bronchodilator orders with one order with BID Frequency and one order with Frequency of every 4 hours PRN wheezing or increased work of breathing using Per Protocol order mode. 7-10 - enter or revise RT Bronchodilator order(s) to two equivalent RT bronchodilator orders with one order with TID Frequency and one order with Frequency of every 4 hours PRN wheezing or increased work of breathing using Per Protocol order mode. 11-13 - enter or revise RT Bronchodilator order(s) to one equivalent RT bronchodilator order with QID Frequency and an Albuterol order with Frequency of every 4 hours PRN wheezing or increased work of breathing using Per Protocol order mode. Greater than 13 - enter or revise RT Bronchodilator order(s) to one equivalent RT bronchodilator order with every 4 hours Frequency and an Albuterol order with Frequency of every 2 hours PRN wheezing or increased work of breathing using Per Protocol order mode. RT to enter RT Home Evaluation for COPD & MDI Assessment order using Per Protocol order mode.     Electronically signed by Delilah Martinez RCP on 3/24/2022 at 8:34 AM

## 2022-03-24 NOTE — PROGRESS NOTES
Patient seen and examined independently by me. Above discussed and I agree with CNP. Labs, cultures, and radiographs where available were reviewed. See orders for the updated patient care plan. Tonia Alberto MD MD,   3/24/2022   8:11 PM General Surgery  Dr Carlos Clifton  Daily Progress Note      Patient:  Ely Delgado      Unit/Bed:5K-24/024-A    YOB: 1960    MRN: [id number]     Acct: [de-identified]     Admit date: 3/16/2022    Subjective: patient sitting in chair. Doing well overall. Planning for surgery tomorrow. Answered all questions at this time. No new complaints. Denies chest pain and SOB. Chart has been reviewed. All other ROS negative except noted in HPI      Patient Seen, Chart, Consults notes, Labs, Radiology studies reviewed. Past, Family, Social History unchanged from admission. Diet:  ADULT DIET; Regular; 4 carb choices (60 gm/meal);  Low Sodium (2 gm)    Medications:  Scheduled Meds:   furosemide  40 mg IntraVENous Daily    lidocaine  1 patch TransDERmal Daily    Or    lidocaine  2 patch TransDERmal Daily    Or    lidocaine  3 patch TransDERmal Daily    [START ON 3/26/2022] heparin (porcine)  5,000 Units SubCUTAneous Q8H    ceFAZolin  2,000 mg IntraVENous Q8H    heparin (porcine)  5,000 Units SubCUTAneous 3 times per day    ipratropium-albuterol  1 ampule Inhalation BID    hydrALAZINE  25 mg Oral TID    sodium chloride flush  5-40 mL IntraVENous 2 times per day    [Held by provider] amitriptyline  10 mg Oral Nightly    [Held by provider] aspirin EC  81 mg Oral Daily    [Held by provider] doxepin  50 mg Oral Nightly    budesonide-formoterol  2 puff Inhalation BID    montelukast  10 mg Oral Nightly    levothyroxine  175 mcg Oral Daily    rOPINIRole  1 mg Oral Q24H    oxybutynin  10 mg Oral Daily    rOPINIRole  2 mg Oral Nightly    carvedilol  25 mg Oral BID     Continuous Infusions:   sodium chloride      dextrose       PRN Meds:melatonin, ipratropium-albuterol, sodium chloride flush, sodium chloride, ondansetron **OR** ondansetron, polyethylene glycol, acetaminophen **OR** acetaminophen, potassium chloride **OR** potassium alternative oral replacement **OR** potassium chloride, albuterol sulfate HFA, oxyCODONE-acetaminophen, [Held by provider] cyclobenzaprine, glucagon (rDNA), dextrose, glucose, dextrose bolus (hypoglycemia)    Objective:    CBC:   Recent Labs     03/23/22  0523   WBC 5.7   HGB 7.4*        BMP:    Recent Labs     03/22/22  0541 03/22/22  1738 03/23/22  0523 03/24/22  0515     --  136 138   K 3.3* 3.6 3.8 3.7     --  101 99   CO2 23  --  23 25   BUN 38*  --  41* 42*   CREATININE 2.6*  --  2.9* 2.5*   GLUCOSE 86  --  100 88     Calcium:  Recent Labs     03/24/22  0515   CALCIUM 8.8     Ionized Calcium:No results for input(s): IONCA in the last 72 hours. Magnesium:  Recent Labs     03/23/22  0523   MG 1.9     Phosphorus:  No results for input(s): PHOS in the last 72 hours. BNP:No results for input(s): BNP in the last 72 hours. Glucose:  Recent Labs     03/21/22  2136   POCGLU 135*     HgbA1C: No results for input(s): LABA1C in the last 72 hours. INR: No results for input(s): INR in the last 72 hours. Hepatic:   No results for input(s): ALKPHOS, ALT, AST, PROT, BILITOT, BILIDIR, LABALBU in the last 72 hours. Amylase and Lipase:No results for input(s): LACTA, AMYLASE in the last 72 hours. Lactic Acid: No results for input(s): LACTA in the last 72 hours. Troponin:   No results for input(s): CKTOTAL, CKMB, TROPONINT in the last 72 hours. BNP: No results for input(s): BNP in the last 72 hours. Lipids: No results for input(s): CHOL, TRIG, HDL, LDL, LDLCALC in the last 72 hours.   ABGs:   Lab Results   Component Value Date    PH 7.42 03/02/2021    PCO2 41 03/02/2021    PO2 69 03/02/2021    HCO3 27 03/02/2021    O2SAT 94 03/02/2021       Radiology reports as per the Radiologist  Radiology: [facility] CONTRAST    Result Date: 3/16/2022  PROCEDURE: CT CHEST WO CONTRAST CLINICAL INFORMATION: cough. COMPARISON: Chest x-ray obtained on the same day. CT scan dated 24th of May 2021. . TECHNIQUE: 5 mm noncontrast axial images were obtained through the chest. Sagittal and coronal reconstructions were obtained. All CT scans at this facility use dose modulation, iterative reconstruction, and/or weight-based dosing when appropriate to reduce radiation dose to as low as reasonably achievable. FINDINGS: There is borderline cardiomegaly. There is calcification in the left anterior descending and right coronary arteries. . The aorta is of normal caliber. There is a 13 mm metallic density in the left lower lobe pulmonary artery. This appears new since previous CT pulmonary angiogram dated 24th of May 2021. Anne Marie Rued There is small mediastinal lymph nodes present. There is no pericardial effusion. There is a small right pleural effusion There is thickening of the interstitial lung markings. There appears to be slightly increased fluid along the fissures. There is thoracic spondylosis. . Is a small hiatal hernia There is splenomegaly. 1. Borderline cardiomegaly. Calcification in the left anterior descending and right coronary arteries. 2. 13 mm metallic density in the left lower lobe pulmonary artery, new since previous CT scan of the chest dated 24th of May 2021. 3. Small right pleural effusion. 4. Small mediastinal lymph nodes. 5. Thickening off the interstitial lung markings. 6. Thoracic spondylosis. 7. Splenomegaly. 8. Small hiatal hernia. **This report has been created using voice recognition software. It may contain minor errors which are inherent in voice recognition technology. ** Final report electronically signed by DR Lashawn Huerta on 3/16/2022 12:47 PM    NM LUNG VENT/PERFUSION (VQ)    Result Date: 3/16/2022  NM Lung Perfusion/ventilation Comparison: None Findings 3.10 mCi technetium 99m MAA used. 6.80 MCI xenon-133 gas used. Wash-in equilibrium and washout images show normal xenon-133 ventilation scan. The lungs are almost completely clear of xenon within 2 minutes. Perfusion lung images show no defects. Low probability of pulmonary embolus. Normal ventilation bilaterally with good washout and no areas of trapping. This document has been electronically signed by: Clarice Hoover MD on 03/16/2022 05:52 PM    XR CHEST PORTABLE    Result Date: 3/16/2022  PROCEDURE: XR CHEST PORTABLE CLINICAL INFORMATION: fever. COMPARISON: Chest x-ray dated third of March 2022. TECHNIQUE: AP upright view of the chest. FINDINGS: There is mild cardiomegaly. .The mediastinum is not widened. There are no pulmonary infiltrates or effusions. The pulmonary vascularity is increased. There is thoracic spondylosis. 1. Mild cardiomegaly and increased pulmonary vascularity. 2. Otherwise negative chest x-ray. . **This report has been created using voice recognition software. It may contain minor errors which are inherent in voice recognition technology. ** Final report electronically signed by DR Lien Hurt on 3/16/2022 11:53 AM       Physical Exam:  Vitals: BP (!) 158/82   Pulse 64   Temp 97.5 °F (36.4 °C) (Oral)   Resp 16   Ht 6' 1\" (1.854 m)   Wt (!) 317 lb 10.9 oz (144.1 kg)   SpO2 99%   BMI 41.91 kg/m²   24 hour intake/output:    Intake/Output Summary (Last 24 hours) at 3/24/2022 0852  Last data filed at 3/24/2022 0000  Gross per 24 hour   Intake 1240 ml   Output 1950 ml   Net -710 ml     Last 3 weights: Wt Readings from Last 3 Encounters:   03/24/22 (!) 317 lb 10.9 oz (144.1 kg)   03/03/22 (!) 333 lb 6.4 oz (151.2 kg)   02/15/22 (!) 338 lb 9.6 oz (153.6 kg)       General appearance - oriented to person, place, and time and overweight  HEENT: Normocephalic and Atraumatic  Chest - clear to auscultation, no wheezes, rales or rhonchi, symmetric air entry  Cardiovascular - normal rate and regular rhythm  Abdomen - BS active.  Soft, round  Wound: mesh that is exposed, purulent drainage  decreasing per patient  Neurological - Alert and oriented and Normal speech  Integumentary - Skin color, texture, turgor normal. No Rashes or lesions  Musculoskeletal -Full ROM times 4 extremities      DVT prophylaxis: [] Lovenox                                 [] SCDs                                 [x] SQ Heparin                                 [] Encourage ambulation           [] Already on Anticoagulation           X Aspirin     Assessment:  1. Infected mesh -no clinical evidence of sepsis at this point  2. Cultures Final Staphylococcus Aureus  3. CAROLE on CKD  4. CHF- HTN   5. COPD  6. DM  7. LAURIE  8. Chronic back pain - percocet   9. 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 *      Plan:  1. Conservative treatment  2. Carb control low sodium diet   3. Analgesia and antiemetics as needed  4. Antibiotic therapy dc'd zyvox, cefepime changed to Ancef   5. Monitor Labs, lytes per protocol  6. medicine management   7. Nephrology and cardiology following   8. Surgical intervention  planned tomorrow - obtain consent, labs in am, hold heparin sub q after midnight, continue antibiotics, NPO after midnight. Start fluids in am      Electronically signed by SAHIL Thomason CNP on 3/24/2022 at 8:52 AM Patient seen and examined independently by me. Above discussed and I agree with CNP. Labs, cultures, and radiographs where available were reviewed. See orders for the updated patient care plan.     Oul Vilchis MD MD, patient resting in bed abdomen is unchanged has exposed mesh plan is for surgical intervention tomorrow again is going to be a challenging difficult surgery due to his adhesions in this mesh we did discuss the problems with attempting to close the abdominal wall patient's hemoglobin is 8.3 will transfuse a unit of blood tomorrow morning and have another available for surgery patient voices understanding proceed with surgery tomorrow  8:12 PM

## 2022-03-24 NOTE — PLAN OF CARE
Problem: Nutrition  Goal: Optimal nutrition therapy  Outcome: Ongoing   Nutrition Problem #1: Increased nutrient needs  Intervention: Food and/or Nutrient Delivery: Continue Current Diet,Start Oral Nutrition Supplement,Vitamin Supplement  Nutritional Goals: Pt will consume 75% or more of meals during LOS to aid in wound healing

## 2022-03-25 ENCOUNTER — ANESTHESIA (OUTPATIENT)
Dept: OPERATING ROOM | Age: 62
DRG: 907 | End: 2022-03-25
Payer: MEDICARE

## 2022-03-25 ENCOUNTER — ANESTHESIA EVENT (OUTPATIENT)
Dept: OPERATING ROOM | Age: 62
DRG: 907 | End: 2022-03-25
Payer: MEDICARE

## 2022-03-25 VITALS — SYSTOLIC BLOOD PRESSURE: 142 MMHG | TEMPERATURE: 98.2 F | DIASTOLIC BLOOD PRESSURE: 88 MMHG | OXYGEN SATURATION: 97 %

## 2022-03-25 LAB
ABO: NORMAL
ANION GAP SERPL CALCULATED.3IONS-SCNC: 13 MEQ/L (ref 8–16)
ANTIBODY SCREEN: NORMAL
BUN BLDV-MCNC: 40 MG/DL (ref 7–22)
CALCIUM SERPL-MCNC: 8.4 MG/DL (ref 8.5–10.5)
CHLORIDE BLD-SCNC: 101 MEQ/L (ref 98–111)
CO2: 24 MEQ/L (ref 23–33)
CREAT SERPL-MCNC: 2.2 MG/DL (ref 0.4–1.2)
ERYTHROCYTE [DISTWIDTH] IN BLOOD BY AUTOMATED COUNT: 18.3 % (ref 11.5–14.5)
ERYTHROCYTE [DISTWIDTH] IN BLOOD BY AUTOMATED COUNT: 53.9 FL (ref 35–45)
GFR SERPL CREATININE-BSD FRML MDRD: 31 ML/MIN/1.73M2
GLUCOSE BLD-MCNC: 151 MG/DL (ref 70–108)
GLUCOSE BLD-MCNC: 88 MG/DL (ref 70–108)
HCT VFR BLD CALC: 26.3 % (ref 42–52)
HEMOGLOBIN: 8.1 GM/DL (ref 14–18)
MCH RBC QN AUTO: 26.8 PG (ref 26–33)
MCHC RBC AUTO-ENTMCNC: 30.8 GM/DL (ref 32.2–35.5)
MCV RBC AUTO: 87.1 FL (ref 80–94)
PLATELET # BLD: 263 THOU/MM3 (ref 130–400)
PMV BLD AUTO: 8.5 FL (ref 9.4–12.4)
POTASSIUM SERPL-SCNC: 3.5 MEQ/L (ref 3.5–5.2)
RBC # BLD: 3.02 MILL/MM3 (ref 4.7–6.1)
RH FACTOR: NORMAL
SODIUM BLD-SCNC: 138 MEQ/L (ref 135–145)
WBC # BLD: 5.7 THOU/MM3 (ref 4.8–10.8)

## 2022-03-25 PROCEDURE — 3700000000 HC ANESTHESIA ATTENDED CARE: Performed by: SURGERY

## 2022-03-25 PROCEDURE — 2580000003 HC RX 258: Performed by: SURGERY

## 2022-03-25 PROCEDURE — 99232 SBSQ HOSP IP/OBS MODERATE 35: CPT | Performed by: INTERNAL MEDICINE

## 2022-03-25 PROCEDURE — 99233 SBSQ HOSP IP/OBS HIGH 50: CPT | Performed by: INTERNAL MEDICINE

## 2022-03-25 PROCEDURE — 1200000003 HC TELEMETRY R&B

## 2022-03-25 PROCEDURE — 6360000002 HC RX W HCPCS: Performed by: ANESTHESIOLOGY

## 2022-03-25 PROCEDURE — 6360000002 HC RX W HCPCS: Performed by: STUDENT IN AN ORGANIZED HEALTH CARE EDUCATION/TRAINING PROGRAM

## 2022-03-25 PROCEDURE — 3600000012 HC SURGERY LEVEL 2 ADDTL 15MIN: Performed by: SURGERY

## 2022-03-25 PROCEDURE — 2580000003 HC RX 258: Performed by: NURSE ANESTHETIST, CERTIFIED REGISTERED

## 2022-03-25 PROCEDURE — 3700000001 HC ADD 15 MINUTES (ANESTHESIA): Performed by: SURGERY

## 2022-03-25 PROCEDURE — 2709999900 HC NON-CHARGEABLE SUPPLY: Performed by: SURGERY

## 2022-03-25 PROCEDURE — 86901 BLOOD TYPING SEROLOGIC RH(D): CPT

## 2022-03-25 PROCEDURE — 11008 RMV PRSTC MTRL/MESH ABD WALL: CPT | Performed by: SURGERY

## 2022-03-25 PROCEDURE — 7100000001 HC PACU RECOVERY - ADDTL 15 MIN: Performed by: SURGERY

## 2022-03-25 PROCEDURE — 6360000002 HC RX W HCPCS: Performed by: SURGERY

## 2022-03-25 PROCEDURE — 6360000002 HC RX W HCPCS: Performed by: NURSE ANESTHETIST, CERTIFIED REGISTERED

## 2022-03-25 PROCEDURE — 85027 COMPLETE CBC AUTOMATED: CPT

## 2022-03-25 PROCEDURE — 6360000002 HC RX W HCPCS: Performed by: INTERNAL MEDICINE

## 2022-03-25 PROCEDURE — 0WPF0JZ REMOVAL OF SYNTHETIC SUBSTITUTE FROM ABDOMINAL WALL, OPEN APPROACH: ICD-10-PCS | Performed by: SURGERY

## 2022-03-25 PROCEDURE — 6370000000 HC RX 637 (ALT 250 FOR IP): Performed by: STUDENT IN AN ORGANIZED HEALTH CARE EDUCATION/TRAINING PROGRAM

## 2022-03-25 PROCEDURE — 86900 BLOOD TYPING SEROLOGIC ABO: CPT

## 2022-03-25 PROCEDURE — 86850 RBC ANTIBODY SCREEN: CPT

## 2022-03-25 PROCEDURE — 1200000000 HC SEMI PRIVATE

## 2022-03-25 PROCEDURE — 94760 N-INVAS EAR/PLS OXIMETRY 1: CPT

## 2022-03-25 PROCEDURE — 86923 COMPATIBILITY TEST ELECTRIC: CPT

## 2022-03-25 PROCEDURE — 82948 REAGENT STRIP/BLOOD GLUCOSE: CPT

## 2022-03-25 PROCEDURE — 2580000003 HC RX 258: Performed by: NURSE PRACTITIONER

## 2022-03-25 PROCEDURE — 36430 TRANSFUSION BLD/BLD COMPNT: CPT

## 2022-03-25 PROCEDURE — 2580000003 HC RX 258: Performed by: STUDENT IN AN ORGANIZED HEALTH CARE EDUCATION/TRAINING PROGRAM

## 2022-03-25 PROCEDURE — 80048 BASIC METABOLIC PNL TOTAL CA: CPT

## 2022-03-25 PROCEDURE — 94640 AIRWAY INHALATION TREATMENT: CPT

## 2022-03-25 PROCEDURE — 3600000002 HC SURGERY LEVEL 2 BASE: Performed by: SURGERY

## 2022-03-25 PROCEDURE — 10180 I&D COMPLEX PO WOUND INFCTJ: CPT | Performed by: SURGERY

## 2022-03-25 PROCEDURE — 7100000000 HC PACU RECOVERY - FIRST 15 MIN: Performed by: SURGERY

## 2022-03-25 PROCEDURE — 2500000003 HC RX 250 WO HCPCS: Performed by: NURSE ANESTHETIST, CERTIFIED REGISTERED

## 2022-03-25 PROCEDURE — 2580000003 HC RX 258: Performed by: INTERNAL MEDICINE

## 2022-03-25 PROCEDURE — P9016 RBC LEUKOCYTES REDUCED: HCPCS

## 2022-03-25 PROCEDURE — 36415 COLL VENOUS BLD VENIPUNCTURE: CPT

## 2022-03-25 RX ORDER — SODIUM CHLORIDE 0.9 % (FLUSH) 0.9 %
5-40 SYRINGE (ML) INJECTION PRN
Status: DISCONTINUED | OUTPATIENT
Start: 2022-03-25 | End: 2022-03-25 | Stop reason: HOSPADM

## 2022-03-25 RX ORDER — PROPOFOL 10 MG/ML
INJECTION, EMULSION INTRAVENOUS PRN
Status: DISCONTINUED | OUTPATIENT
Start: 2022-03-25 | End: 2022-03-25 | Stop reason: SDUPTHER

## 2022-03-25 RX ORDER — DEXAMETHASONE SODIUM PHOSPHATE 4 MG/ML
INJECTION, SOLUTION INTRA-ARTICULAR; INTRALESIONAL; INTRAMUSCULAR; INTRAVENOUS; SOFT TISSUE PRN
Status: DISCONTINUED | OUTPATIENT
Start: 2022-03-25 | End: 2022-03-25 | Stop reason: SDUPTHER

## 2022-03-25 RX ORDER — ROCURONIUM BROMIDE 10 MG/ML
INJECTION, SOLUTION INTRAVENOUS PRN
Status: DISCONTINUED | OUTPATIENT
Start: 2022-03-25 | End: 2022-03-25 | Stop reason: SDUPTHER

## 2022-03-25 RX ORDER — AMITRIPTYLINE HYDROCHLORIDE 50 MG/1
50 TABLET, FILM COATED ORAL NIGHTLY
Status: CANCELLED | OUTPATIENT
Start: 2022-03-25

## 2022-03-25 RX ORDER — SODIUM CHLORIDE 9 MG/ML
INJECTION, SOLUTION INTRAVENOUS PRN
Status: CANCELLED | OUTPATIENT
Start: 2022-03-25

## 2022-03-25 RX ORDER — LABETALOL HYDROCHLORIDE 5 MG/ML
INJECTION, SOLUTION INTRAVENOUS PRN
Status: DISCONTINUED | OUTPATIENT
Start: 2022-03-25 | End: 2022-03-25 | Stop reason: SDUPTHER

## 2022-03-25 RX ORDER — GLYCOPYRROLATE 1 MG/5 ML
SYRINGE (ML) INTRAVENOUS PRN
Status: DISCONTINUED | OUTPATIENT
Start: 2022-03-25 | End: 2022-03-25 | Stop reason: SDUPTHER

## 2022-03-25 RX ORDER — MIDAZOLAM HYDROCHLORIDE 1 MG/ML
INJECTION INTRAMUSCULAR; INTRAVENOUS PRN
Status: DISCONTINUED | OUTPATIENT
Start: 2022-03-25 | End: 2022-03-25 | Stop reason: SDUPTHER

## 2022-03-25 RX ORDER — NEOSTIGMINE METHYLSULFATE 5 MG/5 ML
SYRINGE (ML) INTRAVENOUS PRN
Status: DISCONTINUED | OUTPATIENT
Start: 2022-03-25 | End: 2022-03-25 | Stop reason: SDUPTHER

## 2022-03-25 RX ORDER — SODIUM CHLORIDE 9 MG/ML
25 INJECTION, SOLUTION INTRAVENOUS PRN
Status: DISCONTINUED | OUTPATIENT
Start: 2022-03-25 | End: 2022-03-25 | Stop reason: HOSPADM

## 2022-03-25 RX ORDER — FENTANYL CITRATE 50 UG/ML
INJECTION, SOLUTION INTRAMUSCULAR; INTRAVENOUS PRN
Status: DISCONTINUED | OUTPATIENT
Start: 2022-03-25 | End: 2022-03-25 | Stop reason: SDUPTHER

## 2022-03-25 RX ORDER — ASPIRIN 81 MG/1
81 TABLET ORAL DAILY
Status: CANCELLED | OUTPATIENT
Start: 2022-03-25

## 2022-03-25 RX ORDER — SODIUM CHLORIDE 0.9 % (FLUSH) 0.9 %
5-40 SYRINGE (ML) INJECTION EVERY 12 HOURS SCHEDULED
Status: DISCONTINUED | OUTPATIENT
Start: 2022-03-25 | End: 2022-03-25 | Stop reason: HOSPADM

## 2022-03-25 RX ORDER — ONDANSETRON 2 MG/ML
4 INJECTION INTRAMUSCULAR; INTRAVENOUS
Status: COMPLETED | OUTPATIENT
Start: 2022-03-25 | End: 2022-03-25

## 2022-03-25 RX ORDER — SODIUM CHLORIDE 9 MG/ML
INJECTION, SOLUTION INTRAVENOUS CONTINUOUS PRN
Status: DISCONTINUED | OUTPATIENT
Start: 2022-03-25 | End: 2022-03-25 | Stop reason: SDUPTHER

## 2022-03-25 RX ORDER — FENTANYL CITRATE 50 UG/ML
50 INJECTION, SOLUTION INTRAMUSCULAR; INTRAVENOUS EVERY 5 MIN PRN
Status: COMPLETED | OUTPATIENT
Start: 2022-03-25 | End: 2022-03-25

## 2022-03-25 RX ORDER — MEPERIDINE HYDROCHLORIDE 25 MG/ML
12.5 INJECTION INTRAMUSCULAR; INTRAVENOUS; SUBCUTANEOUS EVERY 5 MIN PRN
Status: DISCONTINUED | OUTPATIENT
Start: 2022-03-25 | End: 2022-03-25 | Stop reason: HOSPADM

## 2022-03-25 RX ORDER — KETOROLAC TROMETHAMINE 30 MG/ML
INJECTION, SOLUTION INTRAMUSCULAR; INTRAVENOUS PRN
Status: DISCONTINUED | OUTPATIENT
Start: 2022-03-25 | End: 2022-03-25 | Stop reason: SDUPTHER

## 2022-03-25 RX ORDER — SUCCINYLCHOLINE CHLORIDE 20 MG/ML
INJECTION INTRAMUSCULAR; INTRAVENOUS PRN
Status: DISCONTINUED | OUTPATIENT
Start: 2022-03-25 | End: 2022-03-25 | Stop reason: SDUPTHER

## 2022-03-25 RX ORDER — DOXEPIN HYDROCHLORIDE 50 MG/1
50 CAPSULE ORAL NIGHTLY
Status: CANCELLED | OUTPATIENT
Start: 2022-03-25

## 2022-03-25 RX ORDER — CYCLOBENZAPRINE HCL 10 MG
10 TABLET ORAL 3 TIMES DAILY PRN
Status: CANCELLED | OUTPATIENT
Start: 2022-03-25

## 2022-03-25 RX ORDER — SODIUM CHLORIDE 9 MG/ML
INJECTION, SOLUTION INTRAVENOUS PRN
Status: COMPLETED | OUTPATIENT
Start: 2022-03-25 | End: 2022-03-25

## 2022-03-25 RX ADMIN — SODIUM CHLORIDE: 9 INJECTION, SOLUTION INTRAVENOUS at 13:16

## 2022-03-25 RX ADMIN — FENTANYL CITRATE 50 MCG: 50 INJECTION INTRAMUSCULAR; INTRAVENOUS at 15:14

## 2022-03-25 RX ADMIN — MONTELUKAST SODIUM 10 MG: 10 TABLET ORAL at 20:15

## 2022-03-25 RX ADMIN — HYDROMORPHONE HYDROCHLORIDE 0.5 MG: 1 INJECTION, SOLUTION INTRAMUSCULAR; INTRAVENOUS; SUBCUTANEOUS at 23:35

## 2022-03-25 RX ADMIN — SODIUM CHLORIDE: 9 INJECTION, SOLUTION INTRAVENOUS at 14:16

## 2022-03-25 RX ADMIN — FENTANYL CITRATE 100 MCG: 50 INJECTION, SOLUTION INTRAMUSCULAR; INTRAVENOUS at 13:25

## 2022-03-25 RX ADMIN — MIDAZOLAM 2 MG: 1 INJECTION INTRAMUSCULAR; INTRAVENOUS at 13:25

## 2022-03-25 RX ADMIN — Medication 2000 MG: at 20:15

## 2022-03-25 RX ADMIN — PROPOFOL 200 MG: 10 INJECTION, EMULSION INTRAVENOUS at 13:25

## 2022-03-25 RX ADMIN — BUDESONIDE AND FORMOTEROL FUMARATE DIHYDRATE 2 PUFF: 160; 4.5 AEROSOL RESPIRATORY (INHALATION) at 18:43

## 2022-03-25 RX ADMIN — FENTANYL CITRATE 50 MCG: 50 INJECTION INTRAMUSCULAR; INTRAVENOUS at 15:40

## 2022-03-25 RX ADMIN — IPRATROPIUM BROMIDE AND ALBUTEROL SULFATE 1 AMPULE: .5; 3 SOLUTION RESPIRATORY (INHALATION) at 18:43

## 2022-03-25 RX ADMIN — Medication 2000 MG: at 03:47

## 2022-03-25 RX ADMIN — SODIUM CHLORIDE, PRESERVATIVE FREE 10 ML: 5 INJECTION INTRAVENOUS at 11:00

## 2022-03-25 RX ADMIN — CARVEDILOL 25 MG: 25 TABLET, FILM COATED ORAL at 17:25

## 2022-03-25 RX ADMIN — CARVEDILOL 25 MG: 25 TABLET, FILM COATED ORAL at 10:59

## 2022-03-25 RX ADMIN — HYDROMORPHONE HYDROCHLORIDE 0.5 MG: 1 INJECTION, SOLUTION INTRAMUSCULAR; INTRAVENOUS; SUBCUTANEOUS at 15:25

## 2022-03-25 RX ADMIN — SODIUM CHLORIDE, PRESERVATIVE FREE 10 ML: 5 INJECTION INTRAVENOUS at 20:15

## 2022-03-25 RX ADMIN — ROPINIROLE HYDROCHLORIDE 2 MG: 1 TABLET, FILM COATED ORAL at 20:15

## 2022-03-25 RX ADMIN — ONDANSETRON 4 MG: 2 INJECTION INTRAMUSCULAR; INTRAVENOUS at 22:07

## 2022-03-25 RX ADMIN — Medication 3000 MG: at 13:10

## 2022-03-25 RX ADMIN — Medication 5 MG: at 14:35

## 2022-03-25 RX ADMIN — FENTANYL CITRATE 50 MCG: 50 INJECTION INTRAMUSCULAR; INTRAVENOUS at 15:19

## 2022-03-25 RX ADMIN — Medication 0.6 MG: at 14:35

## 2022-03-25 RX ADMIN — LABETALOL HYDROCHLORIDE 10 MG: 5 INJECTION INTRAVENOUS at 13:04

## 2022-03-25 RX ADMIN — SODIUM CHLORIDE: 9 INJECTION, SOLUTION INTRAVENOUS at 11:08

## 2022-03-25 RX ADMIN — SODIUM CHLORIDE: 9 INJECTION, SOLUTION INTRAVENOUS at 20:10

## 2022-03-25 RX ADMIN — HYDROMORPHONE HYDROCHLORIDE 0.5 MG: 1 INJECTION, SOLUTION INTRAMUSCULAR; INTRAVENOUS; SUBCUTANEOUS at 15:35

## 2022-03-25 RX ADMIN — Medication 3000 MG: at 11:40

## 2022-03-25 RX ADMIN — HYDROMORPHONE HYDROCHLORIDE 0.5 MG: 1 INJECTION, SOLUTION INTRAMUSCULAR; INTRAVENOUS; SUBCUTANEOUS at 20:31

## 2022-03-25 RX ADMIN — ONDANSETRON HYDROCHLORIDE 4 MG: 4 INJECTION, SOLUTION INTRAMUSCULAR; INTRAVENOUS at 13:34

## 2022-03-25 RX ADMIN — SUCCINYLCHOLINE CHLORIDE 200 MG: 20 INJECTION, SOLUTION INTRAMUSCULAR; INTRAVENOUS at 13:25

## 2022-03-25 RX ADMIN — ROCURONIUM BROMIDE 25 MG: 10 INJECTION INTRAVENOUS at 13:32

## 2022-03-25 RX ADMIN — HYDROMORPHONE HYDROCHLORIDE 0.5 MG: 1 INJECTION, SOLUTION INTRAMUSCULAR; INTRAVENOUS; SUBCUTANEOUS at 17:31

## 2022-03-25 RX ADMIN — FENTANYL CITRATE 50 MCG: 50 INJECTION INTRAMUSCULAR; INTRAVENOUS at 15:30

## 2022-03-25 RX ADMIN — DEXAMETHASONE SODIUM PHOSPHATE 4 MG: 4 INJECTION, SOLUTION INTRAMUSCULAR; INTRAVENOUS at 14:28

## 2022-03-25 RX ADMIN — BUDESONIDE AND FORMOTEROL FUMARATE DIHYDRATE 2 PUFF: 160; 4.5 AEROSOL RESPIRATORY (INHALATION) at 08:40

## 2022-03-25 RX ADMIN — KETOROLAC TROMETHAMINE 30 MG: 30 INJECTION, SOLUTION INTRAMUSCULAR; INTRAVENOUS at 14:31

## 2022-03-25 RX ADMIN — IPRATROPIUM BROMIDE AND ALBUTEROL SULFATE 1 AMPULE: .5; 3 SOLUTION RESPIRATORY (INHALATION) at 08:40

## 2022-03-25 RX ADMIN — HEPARIN SODIUM 5000 UNITS: 5000 INJECTION INTRAVENOUS; SUBCUTANEOUS at 23:36

## 2022-03-25 ASSESSMENT — PULMONARY FUNCTION TESTS
PIF_VALUE: 26
PIF_VALUE: 25
PIF_VALUE: 25
PIF_VALUE: 26
PIF_VALUE: 1
PIF_VALUE: 25
PIF_VALUE: 25
PIF_VALUE: 11
PIF_VALUE: 25
PIF_VALUE: 0
PIF_VALUE: 25
PIF_VALUE: 25
PIF_VALUE: 2
PIF_VALUE: 24
PIF_VALUE: 0
PIF_VALUE: 26
PIF_VALUE: 26
PIF_VALUE: 0
PIF_VALUE: 2
PIF_VALUE: 25
PIF_VALUE: 0
PIF_VALUE: 25
PIF_VALUE: 2
PIF_VALUE: 26
PIF_VALUE: 18
PIF_VALUE: 26
PIF_VALUE: 25
PIF_VALUE: 12
PIF_VALUE: 25
PIF_VALUE: 25
PIF_VALUE: 0
PIF_VALUE: 25
PIF_VALUE: 8
PIF_VALUE: 25
PIF_VALUE: 25
PIF_VALUE: 0
PIF_VALUE: 25
PIF_VALUE: 2
PIF_VALUE: 25
PIF_VALUE: 25
PIF_VALUE: 27
PIF_VALUE: 26
PIF_VALUE: 32
PIF_VALUE: 25
PIF_VALUE: 1
PIF_VALUE: 1
PIF_VALUE: 0
PIF_VALUE: 9
PIF_VALUE: 24
PIF_VALUE: 26
PIF_VALUE: 24
PIF_VALUE: 27
PIF_VALUE: 26
PIF_VALUE: 2
PIF_VALUE: 26
PIF_VALUE: 2
PIF_VALUE: 26
PIF_VALUE: 0
PIF_VALUE: 25
PIF_VALUE: 26
PIF_VALUE: 25
PIF_VALUE: 25
PIF_VALUE: 30
PIF_VALUE: 25
PIF_VALUE: 26
PIF_VALUE: 1
PIF_VALUE: 25
PIF_VALUE: 26
PIF_VALUE: 26
PIF_VALUE: 1
PIF_VALUE: 26
PIF_VALUE: 26
PIF_VALUE: 25
PIF_VALUE: 13
PIF_VALUE: 25
PIF_VALUE: 16
PIF_VALUE: 1
PIF_VALUE: 25
PIF_VALUE: 26
PIF_VALUE: 0
PIF_VALUE: 1
PIF_VALUE: 25
PIF_VALUE: 24
PIF_VALUE: 1
PIF_VALUE: 5
PIF_VALUE: 2
PIF_VALUE: 26
PIF_VALUE: 26
PIF_VALUE: 25
PIF_VALUE: 25
PIF_VALUE: 26
PIF_VALUE: 1
PIF_VALUE: 25
PIF_VALUE: 2
PIF_VALUE: 26
PIF_VALUE: 2
PIF_VALUE: 24
PIF_VALUE: 1
PIF_VALUE: 2
PIF_VALUE: 26
PIF_VALUE: 3
PIF_VALUE: 4
PIF_VALUE: 26
PIF_VALUE: 26
PIF_VALUE: 0
PIF_VALUE: 26
PIF_VALUE: 26
PIF_VALUE: 0
PIF_VALUE: 25
PIF_VALUE: 0
PIF_VALUE: 24
PIF_VALUE: 0
PIF_VALUE: 25
PIF_VALUE: 26
PIF_VALUE: 1
PIF_VALUE: 26

## 2022-03-25 ASSESSMENT — PAIN SCALES - GENERAL
PAINLEVEL_OUTOF10: 10
PAINLEVEL_OUTOF10: 9
PAINLEVEL_OUTOF10: 10
PAINLEVEL_OUTOF10: 8
PAINLEVEL_OUTOF10: 10
PAINLEVEL_OUTOF10: 8

## 2022-03-25 ASSESSMENT — PAIN DESCRIPTION - LOCATION
LOCATION: ABDOMEN;BACK
LOCATION: ABDOMEN;BACK
LOCATION: ABDOMEN

## 2022-03-25 ASSESSMENT — PAIN DESCRIPTION - PROGRESSION
CLINICAL_PROGRESSION: NOT CHANGED
CLINICAL_PROGRESSION: NOT CHANGED

## 2022-03-25 ASSESSMENT — PAIN DESCRIPTION - PAIN TYPE
TYPE: SURGICAL PAIN;CHRONIC PAIN
TYPE: SURGICAL PAIN
TYPE: SURGICAL PAIN;CHRONIC PAIN

## 2022-03-25 ASSESSMENT — PAIN DESCRIPTION - ORIENTATION
ORIENTATION: LOWER;MID
ORIENTATION: LOWER;MID

## 2022-03-25 ASSESSMENT — PAIN DESCRIPTION - FREQUENCY
FREQUENCY: CONTINUOUS

## 2022-03-25 ASSESSMENT — PAIN DESCRIPTION - DESCRIPTORS
DESCRIPTORS: SHARP;SHOOTING
DESCRIPTORS: CONSTANT;DISCOMFORT;PENETRATING
DESCRIPTORS: CONSTANT;DISCOMFORT

## 2022-03-25 ASSESSMENT — PAIN DESCRIPTION - ONSET
ONSET: ON-GOING

## 2022-03-25 ASSESSMENT — PAIN - FUNCTIONAL ASSESSMENT
PAIN_FUNCTIONAL_ASSESSMENT: PREVENTS OR INTERFERES SOME ACTIVE ACTIVITIES AND ADLS
PAIN_FUNCTIONAL_ASSESSMENT: PREVENTS OR INTERFERES SOME ACTIVE ACTIVITIES AND ADLS

## 2022-03-25 ASSESSMENT — ENCOUNTER SYMPTOMS: SHORTNESS OF BREATH: 1

## 2022-03-25 ASSESSMENT — PAIN DESCRIPTION - DIRECTION
RADIATING_TOWARDS: BACK
RADIATING_TOWARDS: BACK

## 2022-03-25 NOTE — ANESTHESIA PRE PROCEDURE
Department of Anesthesiology  Preprocedure Note       Name:  Joao Lyles   Age:  63 y.o.  :  1960                                          MRN:  [id number]         Date:  3/25/2022      Surgeon: Sarah Echavarria):  Tom Tamayo MD    Procedure: Procedure(s):  EXCISION ABDOMINAL WALL MESH    Medications prior to admission:   Prior to Admission medications    Medication Sig Start Date End Date Taking? Authorizing Provider   ipratropium-albuterol (DUONEB) 0.5-2.5 (3) MG/3ML SOLN nebulizer solution Inhale 3 mLs into the lungs every 4 hours as needed for Shortness of Breath 3/21/22  Yes Jin Holland, DO   oxyCODONE-acetaminophen (PERCOCET)  MG per tablet Take 1 tablet by mouth every 8 hours as needed for Pain for up to 30 days. Intended supply: 30 days 3/12/22 4/11/22  SAHIL Newell CNP   gabapentin (NEURONTIN) 300 MG capsule Take 1 capsule by mouth nightly for 30 days.  3/8/22 4/7/22  SAHIL Newell CNP   amitriptyline (ELAVIL) 10 MG tablet Take 20 mg by mouth nightly  1/10/22   Historical Provider, MD   spironolactone (ALDACTONE) 50 MG tablet Take 0.5 tablets by mouth daily  Patient taking differently: Take 50 mg by mouth 2 times daily  22   SAHIL Hernández CNP   Fluticasone furoate-vilanterol (BREO ELLIPTA) 200-25 MCG/INH AEPB inhaler Inhale 1 puff into the lungs daily 2/15/22   SAHIL Ocampo CNP   montelukast (SINGULAIR) 10 MG tablet Take 1 tablet by mouth nightly 2/15/22   SAHIL Ocampo CNP   albuterol sulfate  (90 Base) MCG/ACT inhaler Inhale 2 puffs into the lungs every 6 hours as needed for Wheezing or Shortness of Breath 2/15/22   SAHIL Ocampo CNP   CPAP Machine MISC by Does not apply route Please change bipap 17/13 cm H20. 21   Kyleigh Oakes PA-C   rOPINIRole (REQUIP) 1 MG tablet Take 1 pill in afternoon and 2 at bedtime 21   Kyleigh Oakes PA-C   doxepin (SINEQUAN) 25 MG capsule Take 2 capsules by mouth nightly 12/28/21   Angel Bull PA-C   ibuprofen (ADVIL;MOTRIN) 200 MG tablet Take 400 mg by mouth every 6 hours as needed for Pain    Historical Provider, MD   aspirin EC 81 MG EC tablet Take 1 tablet by mouth daily 9/10/21   SAHIL Hartley CNP   sacubitril-valsartan (ENTRESTO) 49-51 MG per tablet Take 1 tablet by mouth 2 times daily 9/9/21   SAHIL Hartley CNP   bumetanide (BUMEX) 1 MG tablet Take 1 tablet by mouth daily AND 1 tablet as needed. 9/8/21   SAHIL Hartley CNP   hydrALAZINE (APRESOLINE) 100 MG tablet Take 1 tablet by mouth every 8 hours  Patient taking differently: Take 100 mg by mouth 2 times daily  6/14/21   SAHIL Hartley CNP   nitroGLYCERIN (NITROSTAT) 0.4 MG SL tablet Place 1 tablet under the tongue every 5 minutes as needed for Chest pain (up to 3 doses) 6/7/21   SAHIL Hartley CNP   oxybutynin (DITROPAN XL) 10 MG extended release tablet Take 1 tablet by mouth daily 4/13/21   Ann-Marie Fernández MD   tamsulosin [facility]) 0.4 MG capsule Take 1 capsule by mouth nightly 4/13/21 4/13/22  Ann-Marie Fernández MD   levothyroxine (SYNTHROID) 200 MCG tablet Take 1 tablet by mouth Daily 3/3/21   Fatou Francis MD   bismuth subsalicylate (PEPTO BISMOL) 262 MG/15ML suspension Take 15 mLs by mouth as needed for Diarrhea     Historical Provider, MD   glimepiride (AMARYL) 4 MG tablet Take 8 mg by mouth daily  1/16/21   Historical Provider, MD   carvedilol (COREG) 25 MG tablet Take 1 tablet by mouth 2 times daily 7/24/20   Jesus Holt MD   cyclobenzaprine (FLEXERIL) 10 MG tablet Take 10 mg by mouth 3 times daily as needed for Muscle spasms    Historical Provider, MD   azelastine (ASTELIN) 137 MCG/SPRAY nasal spray 1 spray by Nasal route as needed.  Use in each nostril as directed    Historical Provider, MD       Current medications:    Current Facility-Administered Medications   Medication Dose Route Frequency Provider Last Rate Last Admin    ceFAZolin (ANCEF) 3000 mg in dextrose 5 % 100 mL IVPB  3,000 mg IntraVENous 30 Min Pre-Op Celia Sibley  mL/hr at 03/25/22 1140 3,000 mg at 03/25/22 1140    melatonin tablet 4.5 mg  4.5 mg Oral Nightly PRN Oral Matthew MD   4.5 mg at 03/24/22 2111    0.9 % sodium chloride infusion   IntraVENous Continuous Lennybritta Scott, APRN - CNP   Stopped at 03/25/22 1103    ceFAZolin (ANCEF) 2000 mg in sterile water 20 mL IV syringe  2,000 mg IntraVENous Adela Villalobos MD        [Held by provider] furosemide (LASIX) injection 40 mg  40 mg IntraVENous Daily Oral Matthew MD   40 mg at 03/24/22 4987    lidocaine 4 % external patch 1 patch  1 patch TransDERmal Daily Oral Matthew MD   1 patch at 03/23/22 4550    Or    lidocaine 4 % external patch 2 patch  2 patch TransDERmal Daily Oral Mattehw MD        Or    lidocaine 4 % external patch 3 patch  3 patch TransDERmal Daily Leobardo Malone, MD  Arlena Kanner ON 3/26/2022] heparin (porcine) injection 5,000 Units  5,000 Units SubCUTAneous Q8H Oral Matthew MD        ipratropium-albuterol (DUONEB) nebulizer solution 1 ampule  1 ampule Inhalation BID Tala Vargas, DO   1 ampule at 03/25/22 0840    hydrALAZINE (APRESOLINE) tablet 25 mg  25 mg Oral TID Mark Sanchez, DO   25 mg at 03/24/22 1941    ipratropium-albuterol (DUONEB) nebulizer solution 1 ampule  1 ampule Inhalation Q4H PRN Tala Culveri, DO        sodium chloride flush 0.9 % injection 5-40 mL  5-40 mL IntraVENous 2 times per day Srinivas Vargas, DO   10 mL at 03/25/22 1100    sodium chloride flush 0.9 % injection 5-40 mL  5-40 mL IntraVENous PRN Tala Culveri, DO   10 mL at 03/20/22 0611    0.9 % sodium chloride infusion  25 mL IntraVENous PRN Tala Culveri, DO        ondansetron (ZOFRAN-ODT) disintegrating tablet 4 mg  4 mg Oral Q8H PRN Tala Culveri, DO   4 mg at 03/24/22 2117    Or    ondansetron (ZOFRAN) injection 4 mg  4 mg IntraVENous Q6H PRN Tala Vargas DO   4 mg at 03/24/22 0215    polyethylene glycol (GLYCOLAX) packet 17 g  17 g Oral Daily PRN Ozella Dopp Alicia, DO        acetaminophen (TYLENOL) tablet 650 mg  650 mg Oral Q6H PRN Ozella Dopp Alicia, DO   650 mg at 03/17/22 0034    Or    acetaminophen (TYLENOL) suppository 650 mg  650 mg Rectal Q6H PRN Ozella Dopp Alicia, DO        potassium chloride (KLOR-CON M) extended release tablet 40 mEq  40 mEq Oral PRN Ozella Dopp Alicia, DO   40 mEq at 03/22/22 0444    Or    potassium bicarb-citric acid (EFFER-K) effervescent tablet 40 mEq  40 mEq Oral PRN Ozella Dopp Alicia, DO   40 mEq at 03/17/22 1644    Or    potassium chloride 10 mEq/100 mL IVPB (Peripheral Line)  10 mEq IntraVENous PRN Ozella Dopp Alicia,  mL/hr at 03/17/22 1118 10 mEq at 03/17/22 1118    albuterol sulfate  (90 Base) MCG/ACT inhaler 2 puff  2 puff Inhalation Q6H PRN Srinivas  Alicia, DO        [Held by provider] amitriptyline (ELAVIL) tablet 10 mg  10 mg Oral Nightly Srinivas  Alicia, DO        [Held by provider] aspirin EC tablet 81 mg  81 mg Oral Daily Srinivas M Alicia, DO   81 mg at 03/22/22 0802    [Held by provider] doxepin (SINEQUAN) capsule 50 mg  50 mg Oral Nightly Srinivas  Alicia, DO        budesonide-formoterol (SYMBICORT) 160-4.5 MCG/ACT inhaler 2 puff  2 puff Inhalation BID Srinivas M Alicia, DO   2 puff at 03/25/22 0840    montelukast (SINGULAIR) tablet 10 mg  10 mg Oral Nightly Srinivas  Alicia, DO   10 mg at 03/24/22 1941    levothyroxine (SYNTHROID) tablet 175 mcg  175 mcg Oral Daily Ozella Dopp Alicia, DO   175 mcg at 03/24/22 0538    rOPINIRole (REQUIP) tablet 1 mg  1 mg Oral Q24H Srinivas  Alicia, DO   1 mg at 03/24/22 1353    oxyCODONE-acetaminophen (PERCOCET)  MG per tablet 1 tablet  1 tablet Oral Q8H PRN Renaee Cousin, DO   1 tablet at 03/24/22 2111    oxybutynin (DITROPAN-XL) extended release tablet 10 mg  10 mg Oral Daily Srinivas Vargas, DO   10 mg at 03/24/22 0850    [Held by provider] cyclobenzaprine (FLEXERIL) tablet 10 mg  10 mg Oral TID PRN Ozella Dopp Alicia, DO        glucagon (rDNA) injection 1 mg  1 mg IntraMUSCular PRN Srinivas Culveri, DO        dextrose 5 % solution 100 mL/hr IntraVENous PRN Hayden Viky Alicia, DO        glucose chewable tablet 4 each  4 tablet Oral PRN Hayden Viky Alicia, DO        dextrose bolus (hypoglycemia) 10% 125 mL  125 mL IntraVENous PRN Srinivas RENEA Alicia, DO        rOPINIRole (REQUIP) tablet 2 mg  2 mg Oral Nightly Srinivas RENEA Alicia, DO   2 mg at 03/24/22 1941    carvedilol (COREG) tablet 25 mg  25 mg Oral BID Elise Si H Le, DO   25 mg at 03/25/22 1059       Allergies:     Allergies   Allergen Reactions    Shellfish-Derived Products Swelling     Tolerates IV dye without any problems      Pcn [Penicillins] Itching    Succinylcholine      Pseudocholinesterase Deficiency    Sulfa Antibiotics Itching    Morphine Nausea And Vomiting     \"head swimming, skin crawling\"    Tape [Adhesive Tape] Rash       Problem List:    Patient Active Problem List   Diagnosis Code    Allergy to bee sting Z91.030    Obesity, Class III, BMI 40-49.9 (morbid obesity) ([facility]) E66.01    Weight gain R63.5    Hypertension I10    Rheumatoid arteritis ([facility]) M05.20    Hypothyroidism E03.9    Gastroenteritis K52.9    Sleep apnea G47.30    Small intestinal bacterial overgrowth K63.89    Acute diarrhea R19.7    Generalized abdominal pain R10.84    Nausea and vomiting R11.2    Hepatomegaly R16.0    Ileus ([facility]) K56.7    Hypokalemia E87.6    S/P partial resection of colon Z90.49    S/P laparotomy Z98.890    Acute kidney failure with tubular necrosis ([facility]) N17.0    Hypernatremia E87.0    Serum potassium decreased E87.6    Other headache syndrome G44.89    RLS (restless legs syndrome) G25.81    Psychophysiological insomnia F51.04    Angina, class III ([facility]) I20.9    Acute respiratory failure with hypoxia ([facility]) J96.01    Dyspnea R06.00    CHF (NYHA class III, ACC/AHA stage C) ([facility]) I50.9    Nonhealing surgical wound T81.89XA    Diabetes mellitus ([facility]) E11.9    CAROLE (acute kidney injury) ([facility] [address] N17.9    Infected prosthetic mesh of abdominal wall ([facility]) T85.79XA    Chronic wound infection of abdomen S31.109A, L08.9       Past Medical History:        Diagnosis Date    Arthritis     Back problem     back pain-sees [facility] pain mgmt    Bladder disease     Dr. Pierre Muro CHF with unknown LVEF ([facility] [address] 05/24/2021    Dr. George/CHF clinic    Constipation     Diabetes mellitus ([facility]     Dr. Abhilash Sagastume Hypertension     Hypertensive emergency 4/11/2019    Hypertensive urgency 4/13/2019    Sleep apnea     has bipap-sees AZRA Olmedo CNP    Thyroid disease        Past Surgical History:        Procedure Laterality Date    ABDOMEN SURGERY      APPENDECTOMY      CARDIAC CATHETERIZATION      no stents    CARPAL TUNNEL RELEASE Bilateral     COLONOSCOPY  2017    Dr. Celia Lerner, ESOPHAGUS     [address]      x3 surgeries with 14 repairs    KNEE SURGERY Right 1980's    cartilage    OH EXPLORATORY OF ABDOMEN N/A 2/5/2018    ABDOMINAL EXPLORATION WITH LYSIS OF COMPLICATED ADHESIONS WITH AN EXTENDED RIGHT COLON RESECTION performed by Tonia Alberto MD at [facility] History:    Social History     Tobacco Use    Smoking status: Former Smoker     Types: Pipe    Smokeless tobacco: Current User     Types: Chew    Tobacco comment: quit pipe over 30 yrs ago   Substance Use Topics    Alcohol use: No     Alcohol/week: 0.0 standard drinks     Comment: quit                                Ready to quit: Not Answered  Counseling given: Not Answered  Comment: quit pipe over 30 yrs ago      Vital Signs (Current):   Vitals:    03/25/22 0841 03/25/22 1050 03/25/22 1130 03/25/22 1150   BP:  (!) 155/78 (!) 155/78 (!) 161/75   Pulse:  68 68 65   Resp:  16 16 16   Temp:  98.3 °F (36.8 °C) 98.3 °F (36.8 °C) 98.3 °F (36.8 °C)   TempSrc:  Oral     SpO2: 93%  93% 94%   Weight:       Height:                                                  BP Readings from Last 3 Encounters:   03/25/22 (!) 161/75   03/03/22 136/80   02/15/22 116/70       NPO Status: BMI:   Wt Readings from Last 3 Encounters:   03/24/22 (!) 317 lb 10.9 oz (144.1 kg)   03/03/22 (!) 333 lb 6.4 oz (151.2 kg)   02/15/22 (!) 338 lb 9.6 oz (153.6 kg)     Body mass index is 41.91 kg/m². CBC:   Lab Results   Component Value Date    WBC 5.7 03/25/2022    RBC 3.02 03/25/2022    RBC 5.15 08/12/2011    HGB 8.1 03/25/2022    HCT 26.3 03/25/2022    MCV 87.1 03/25/2022    RDW 14.7 02/09/2018     03/25/2022       CMP:   Lab Results   Component Value Date     03/25/2022    K 3.5 03/25/2022    K 3.6 03/22/2022     03/25/2022    CO2 24 03/25/2022    BUN 40 03/25/2022    CREATININE 2.2 03/25/2022    LABGLOM 31 03/25/2022    GLUCOSE 88 03/25/2022    PROT 7.3 03/18/2022    CALCIUM 8.4 03/25/2022    BILITOT 0.4 03/18/2022    ALKPHOS 104 03/18/2022    AST 15 03/18/2022    ALT 19 03/18/2022       POC Tests: No results for input(s): POCGLU, POCNA, POCK, POCCL, POCBUN, POCHEMO, POCHCT in the last 72 hours.     Coags:   Lab Results   Component Value Date    INR 1.14 06/16/2021    APTT 33.5 06/16/2021       HCG (If Applicable): No results found for: PREGTESTUR, PREGSERUM, HCG, HCGQUANT     ABGs: No results found for: PHART, PO2ART, THT9SKH, WBZ1IBX, BEART, N1YRPHER     Type & Screen (If Applicable):  Lab Results   Component Value Date    LABRH POS 03/25/2022       Drug/Infectious Status (If Applicable):  Lab Results   Component Value Date    HEPCAB Negative 12/01/2017       COVID-19 Screening (If Applicable):   Lab Results   Component Value Date    COVID19 Not Detected 03/19/2022           Anesthesia Evaluation  Patient summary reviewed and Nursing notes reviewed no history of anesthetic complications:   Airway: Mallampati: III  TM distance: >3 FB   Neck ROM: full  Mouth opening: > = 3 FB Dental:          Pulmonary:   (+) shortness of breath:  sleep apnea:  decreased breath sounds,                             Cardiovascular:  Exercise tolerance: poor (<4 METS),   (+) hypertension:, angina:, CHF:,       ECG reviewed                        Neuro/Psych:   (+) headaches:,             GI/Hepatic/Renal:   (+) liver disease:, morbid obesity          Endo/Other:    (+) DiabetesType II DM, , hypothyroidism: arthritis: rheumatoid. , . Pt had no PAT visit       Abdominal:   (+) obese,     Abdomen: soft. Vascular: negative vascular ROS. Other Findings:             Anesthesia Plan      general     ASA 3       Induction: intravenous. MIPS: Postoperative opioids intended and Prophylactic antiemetics administered. Anesthetic plan and risks discussed with patient and spouse. Plan discussed with CRNA.                   [address] [facility], DO   3/25/2022

## 2022-03-25 NOTE — ANESTHESIA POSTPROCEDURE EVALUATION
Department of Anesthesiology  Postprocedure Note    Patient: Agustin Castro  MRN: [id number]  YOB: 1960  Date of evaluation: 3/25/2022  Time:  3:51 PM     Procedure Summary     Date: 03/25/22 Room / Location: [address] [facility]    Anesthesia Start: 1302 Anesthesia Stop: 2340    Procedure: ABDOMINAL LYSIS OF ADHESIONS, EXPLANT OF INFECTED MESH (N/A Abdomen) Diagnosis: (Infected hernia mesh Non healing abdominal wound)    Surgeons: Enrike Escobar MD Responsible Provider: Gaurang Vallejo DO    Anesthesia Type: general ASA Status: 3          Anesthesia Type: general    Meche Phase I: Meche Score: 8    Meche Phase II:      Last vitals: Reviewed and per EMR flowsheets.        Anesthesia Post Evaluation    Patient location during evaluation: PACU  Patient participation: complete - patient participated  Level of consciousness: awake  Airway patency: patent  Nausea & Vomiting: no vomiting and no nausea  Complications: no  Cardiovascular status: hemodynamically stable  Respiratory status: acceptable and nasal cannula  Hydration status: stable

## 2022-03-25 NOTE — PLAN OF CARE
I have reviewed the patient's plan of care and based on this review, the patient treatment plan has been updated. Problem: Pain:  Goal: Pain level will decrease  Description: Pain level will decrease  Outcome: Ongoing  Note: Patient satisfied with pain control. Problem: Falls - Risk of:  Goal: Will remain free from falls  Description: Will remain free from falls  Outcome: Ongoing  Note: Patient verbalizes understanding of fall precautions, uses call light appropriately. Problem: Skin Integrity:  Goal: Absence of new skin breakdown  Description: Absence of new skin breakdown  Outcome: Ongoing  Note: Skin remains clean dry and intact. Problem: Impaired respiratory status  Goal: Clear lung sounds  Description: Clear lung sounds  3/25/2022 0843 by Macey Grady RCP  Outcome: Ongoing  Note: Continue with txs as ordered to improve breath sounds and decrease work of breathing. Problem: Discharge Planning:  Goal: Discharged to appropriate level of care  Description: Discharged to appropriate level of care  Outcome: Ongoing  Note: Working toward goal of discharge to home.      Problem: Nutrition  Goal: Optimal nutrition therapy  Outcome: Ongoing

## 2022-03-25 NOTE — PROGRESS NOTES
Arrived to OR holding, consents obtained and verified. Bilateral nares swabbed with alcohol. Temp WNL. Family contact obtained for updates, family directed to waiting room.

## 2022-03-25 NOTE — CARE COORDINATION
Collaborative Discharge Planning    Sawyer Sanchez  :  1960  MRN:  [id number]    ADMIT DATE:  3/16/2022      Discharge Planning Discharge Planning  Patient expects to be discharged to[de-identified] [facility] Board Notes /Social Work Whiteboard Notes  /Social Work Whiteboard: 3/24 CM: plans home w spouse Nino Hall; current w Wound/CHF Clinics, has CPAP    Procedure   3/19 CT Abd/Pelvis: Gas and fluid external to the abdomen and near the umbilicus along the patient's hernia mesh level. No evidence of intra-abdominal abscess. 3/25 Excision of infected mesh by Dr. Ro Friedman planned. Discharge Plan Home with family    Discharge Milestones and Delays: Administering IV medications and Clinical status    Surgery today. Spoke with pt, he denies any needs at discharge. Lasix iv daily, Ancef iv q8hr. Heparin subq q8hr. Possible discharge this weekend. 3/25/22, 1:12 PM EDT    Patient goals/plan/ treatment preferences discussed by  and . Patient goals/plan/ treatment preferences reviewed with patient/ family. Patient/ family verbalize understanding of discharge plan and are in agreement with goal/plan/treatment preferences. Understanding was demonstrated using the teach back method. AVS provided by RN at time of discharge, which includes all necessary medical information pertaining to the patients current course of illness, treatment, post-discharge goals of care, and treatment preferences.         IMM Letter  IMM Letter given to Patient/Family/Significant other/Guardian/POA/by[de-identified] Zhou Reese RN Case Manager  IMM Letter date given[de-identified] 22  IMM Letter time given[de-identified] 1135       SIGNED:  Rox Uriostegui RN   3/25/2022, 1:06 PM

## 2022-03-25 NOTE — BRIEF OP NOTE
Brief Postoperative Note      Patient: Charlee Harper  YOB: 1960  MRN: [id number]    Date of Procedure: 3/25/2022    Pre-Op Diagnosis: Infected hernia mesh Non healing abdominal wound    Post-Op Diagnosis: same with adhesions       Procedure(s):  ABDOMINAL LYSIS OF ADHESIONS, EXPLANT OF INFECTED MESH    Surgeon(s):  Kandis Culp MD    Assistant:  First Assistant: Jose Lopez RN    Anesthesia: General    Estimated Blood Loss (mL): 50 ml    Complications: none    Specimens:       Implants:        Drains:   [REMOVED] Urethral Catheter Non-latex 16 fr (Removed)       Findings: see op note    Electronically signed by Kandis Culp MD on 3/25/2022 at 2:59 PM

## 2022-03-25 NOTE — PROGRESS NOTES
[facility]   Nephrology progress note  3/25/2022, 8:51 AM      Pt Name:    Angelica Billingsley  MRN:     [id number]     YOB: 1960  Admit Date:    3/16/2022 10:41 AM  Primary Care Physician:  Jose De Jesus Hammer DO   Room number  2Q-90/204-O    Chief Complaint: Nephrology following for CAROLE/CKD    Subjective:  Patient seen and examined  Seen earlier today during rounds  Awaiting surgery  No chest pain  No shortness of breath      Objective:  24HR INTAKE/OUTPUT:      Intake/Output Summary (Last 24 hours) at 3/25/2022 0851  Last data filed at 3/25/2022 0430  Gross per 24 hour   Intake 640 ml   Output 3350 ml   Net -2710 ml     I/O last 3 completed shifts: In: 640 [P.O.:640]  Out: 4050 [Urine:4050]  No intake/output data recorded. Admission weight: (!) 328 lb (148.8 kg)  Wt Readings from Last 3 Encounters:   03/24/22 (!) 317 lb 10.9 oz (144.1 kg)   03/03/22 (!) 333 lb 6.4 oz (151.2 kg)   02/15/22 (!) 338 lb 9.6 oz (153.6 kg)     Body mass index is 41.91 kg/m².     Physical examination  VITALS:     Vitals:    03/24/22 1830 03/24/22 1930 03/25/22 0345 03/25/22 0841   BP: 133/67 (!) 148/65 (!) 156/89    Pulse: 73 70 58    Resp:  18 16    Temp:  97.9 °F (36.6 °C) 97 °F (36.1 °C)    TempSrc:  Oral Axillary    SpO2:  97% 93% 93%   Weight:       Height:         General Appearance: alert and cooperative with exam, appears comfortable, no distress  Mouth/Throat: Oral mucosa moist  Lungs: No use of accessory muscles, on room air  Extremities: No significant lower extremity edema      Lab Data  CBC:   Recent Labs     03/23/22  0523 03/24/22  0939 03/25/22  0500   WBC 5.7 5.3 5.7   HGB 7.4* 8.3* 8.1*   HCT 24.8* 27.4* 26.3*    291 263     BMP:  Recent Labs     03/23/22  0523 03/24/22  0515 03/25/22  0500    138 138   K 3.8 3.7 3.5    99 101   CO2 23 25 24   BUN 41* 42* 40*   CREATININE 2.9* 2.5* 2.2*   GLUCOSE 100 88 88   CALCIUM 8.6 8.8 8.4*   MG 1.9  --   --      Hepatic: No results for input(s): LABALBU, AST, ALT, ALB, BILITOT, ALKPHOS in the last 72 hours. Meds:  Infusion:    sodium chloride      sodium chloride      dextrose       Meds:    ceFAZolin  3,000 mg IntraVENous 30 Min Pre-Op    ceFAZolin 2000 mg in 20 mL SWFI IV Syringe  2,000 mg IntraVENous Q8H    furosemide  40 mg IntraVENous Daily    lidocaine  1 patch TransDERmal Daily    Or    lidocaine  2 patch TransDERmal Daily    Or    lidocaine  3 patch TransDERmal Daily    [START ON 3/26/2022] heparin (porcine)  5,000 Units SubCUTAneous Q8H    ipratropium-albuterol  1 ampule Inhalation BID    hydrALAZINE  25 mg Oral TID    sodium chloride flush  5-40 mL IntraVENous 2 times per day    [Held by provider] amitriptyline  10 mg Oral Nightly    [Held by provider] aspirin EC  81 mg Oral Daily    [Held by provider] doxepin  50 mg Oral Nightly    budesonide-formoterol  2 puff Inhalation BID    montelukast  10 mg Oral Nightly    levothyroxine  175 mcg Oral Daily    rOPINIRole  1 mg Oral Q24H    oxybutynin  10 mg Oral Daily    rOPINIRole  2 mg Oral Nightly    carvedilol  25 mg Oral BID     Meds prn: melatonin, ipratropium-albuterol, sodium chloride flush, sodium chloride, ondansetron **OR** ondansetron, polyethylene glycol, acetaminophen **OR** acetaminophen, potassium chloride **OR** potassium alternative oral replacement **OR** potassium chloride, albuterol sulfate HFA, oxyCODONE-acetaminophen, [Held by provider] cyclobenzaprine, glucagon (rDNA), dextrose, glucose, dextrose bolus (hypoglycemia)       Impression and Plan:  1. CAROLE on CKD 3A.   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 Volume overload. Improved  3. Proteinuria secondary to diabetic nephropathy +/-secondary FSGS (post adaptive from obesity)  4. Hypokalemia. Secondary to diuretics, replace as needed. 5.  Abdominal wound. For surgery today  6. Chronic diastolic dysfunction with volume overload. Plan as above  7. Chronically uncontrolled diabetes mellitus  8. Essential hypertension. Stable with Coreg and hydralazine.      D/W patient     Wali Lorenzo MD  [facility]

## 2022-03-25 NOTE — PROGRESS NOTES
1506: pt arrives to [facility]. Pt on 4L nasal cannula. Pt responds to verbal stimulation. VSS. Respirations unlabored. 1514: pt given 50mcg of fentanyl   1519: pt given 50mcg of fentanyl   1525: pt given 0.5mg of dilaudid   1530: pt given 50 mcg of fentanyl   1535: pt given 0.5mg of dilaudid   1540: pt given 50mcg of fentanyl   1545: pt states he just wants to get to the floor to see his wife. Pt states he doesn't want anymore pain medications  1550: report called to 5K JUJU calero   1600: pt meets discharge criteria from pacu.  Pt transported to [facility]

## 2022-03-25 NOTE — PROGRESS NOTES
Hospitalist Progress Note    Patient: Ju Myles  Unit/Bed: 6W-05/031-L  YOB: 1960  MRN: [id number]  Acct: [de-identified]    PCP: Pedro Armijo DO    Date of Admission: 3/16/2022    Assessment/Plan:    Sepsis secondary to abdominal wound - Resolving. Present on arrival. SIRS 2/4 (Tachycardia and tachypnea). Abdominal wound is from dehiscence of hernia repair in 2018 with surgical mesh exposure, now with purulent discharge. Patient received 1 dose Ceftriaxone within the ED and 30cc/kg fluid bolus resuscitation. CT abdomen without abscess. - MRSA nares negative, Blood Cx NGTD, Wound Cx MSSA and corynebacterium species  - Continue Cefazolin  - Surgery planned for today with Dr. Jessica Gaytan 1 CAROLE on CKD 3a with renal progression to 3b - Improving. Renal progression appears to have occurred around 2/2022 and likely medication related with adjustments to home Bumex regimen. This may be his new baseline. Mild worsening of CAROLE during admission is to be expected with use of diuretic.  - Avoid nephrotoxic agents and renally dose medications  - Daily standing weights, strict I/O  - Daily BMP  - Nephrology following    Elevated microalbumin/Creatinine ratio - 1060mg/g. Likely secondary to hypertension and diabetes. - Nephrology following    Chronic systolic heart failure NYHA II - Without exacerbation. EF=55-60% on ECHO 5/25/21. Implanted CardioMEMS 6/16/2021. [address] 12/1/2020 demonstrating elevated LVEDP. Patient follows with CHF clinic. CardioMEMS download completed 3/22/22 with only mild elevation from baseline.  - Patient not currently taking Entresto, spironolactone, carvedilol or Bumex per chart review and recommendations of outside provider per patient  - Lasix 40mg daily  - Cardiology following     COPD GOLD 2 - Without exacerbation. PFT 3/15/2021 FEV1= 75% of predicted. - Continue home Montelukast, Symbicort  - Duonebs twice daily  - Continue Acapella and IS     NIDDM2 - Uncontrolled. A1c=9.9 (5/24/21)  - high dose ssi, POCT glucose 4x daily AC/HS, Hypoglycemia protocol  - Inpatient BG goal 140-180; tight glucose control will be imperative in the laci and post-operative state for adequate healing.     Primary HTN - Currently normotensive. Patient is not taking home hydralazine per recommendations of outside provider.  - Continue hydralazine to 25mg three times daily    Iron deficiency anemia - Iron 40, Ferritin 423, Iron sat 20%. - Following with nephrology; EPO at their discretion    Elevated troponin - Secondary to type II demand ischemia. Patient denies chest pain, EKG without ST or T-wave abnormalities. - No indication to trend    LAURIE - CPAP at home  - Continue home CPAP     BPH - Noted  - Holding Tamsulosin for CAROLE     Hx of hypothyroidism - Noted  - Continue Synthroid     Hx of chronic back pain - Noted  - Resume home percocet  - Hold Flexeril    High anion gap metabolic acidosis - Resolved. Secondary to sepsis and elevated lactic acid. ?Pulmonary embolism - Resolved. Pulmonary embolism was considered within the ED, but CTA not obtainable due to worsening renal function. V/Q scan demonstrated low probability of pulmonary embolism. Moderate Hypokalemia - Resolved. Secondary to diuretic use. Patient also received 80mg Lasix within the ED. Magnesium and phosphorus within normal limits  - Continue potassium replacement as needed  - Continuous telemetry    Acute hypoxic respiratory failure - Resolved. Weaned to room air. Likely secondary to fluid overload.  Negative viral respiratory panel 3/19/22.  - Continue Duoneb twice daily      Expected discharge date:  TBD    Disposition:    [x] Home       [] TCU       [] Rehab       [] Psych       [] SNF       [] [place]       [] Other -    Chief Complaint: Fever    Hospital Course:  Grady Olivas is a 63 y.o. male with PMHx of HTN, CHF, BPH, LAURIE, hypothyroidism, chronic back pain, CKD, and NIDDM2 who presented to [facility] [address] with complaint of fever that began approximately 5 days prior to presentation that was self-reported as high as 101F. The patient had associated symptoms of diaphoresis. He noted that he also has increased cough and shortness of breath from his baseline during these complaints. He has been taking Ibuprofen for his fever and OTC cough medicine for his cough which he states helps. The patient reported that his shortness of breath as well as his cough have been occurring for the last 4 months and have not worsened over that time period. He also stated that he is recently been instructed to hold his Bumex as well as his Entresto from his nephrologist and CHF clinic respectively. He denies headache, lightheadedness, change in vision, rhinorrhea, congestion, sore throat, cough, hemoptysis, chest pain, palpitations, abdominal pain, nausea, vomiting, hematemesis, change in bowel/bladder habits, hematochezia, hematuria, weakness, difficulty with ambulation, numbness/tingling, or known exposure to sick contacts. He admits to fever, chills, AKINS, PND, and shortness of breath. Within the emergency department the patient was administered Lasix 80mg, ASA 324mg, 1 dose of Ceftriaxone 1g, and 4mg morphine, He also underwent CT of the chest which demonstrated borderline cardiomegaly, calcification of the LAD and RCA coronary arteries, small right pleural effusion and splenomegaly. The patient was admitted to the hospital service for further care and management. Orders were placed for general surgery consult. Subjective (past 24 hours): Per nursing there were no acute events overnight. The patient stated that he slept very well and is ready for his surgery today. He does not have any additional concerns or complaints. Review of Systems: 12 point review of systems completed and pertinent positives are noted in the HPI. All other systems reviewed and negative.     Medications:  Reviewed    Infusion Medications    sodium chloride      sodium chloride      dextrose       Scheduled Medications    ceFAZolin 2000 mg in 20 mL SWFI IV Syringe  2,000 mg IntraVENous Q8H    ceFAZolin 3000 mg in 30 mL SWFI IV Syringe  3,000 mg IntraVENous 30 Min Pre-Op    furosemide  40 mg IntraVENous Daily    lidocaine  1 patch TransDERmal Daily    Or    lidocaine  2 patch TransDERmal Daily    Or    lidocaine  3 patch TransDERmal Daily    [START ON 3/26/2022] heparin (porcine)  5,000 Units SubCUTAneous Q8H    ipratropium-albuterol  1 ampule Inhalation BID    hydrALAZINE  25 mg Oral TID    sodium chloride flush  5-40 mL IntraVENous 2 times per day    [Held by provider] amitriptyline  10 mg Oral Nightly    [Held by provider] aspirin EC  81 mg Oral Daily    [Held by provider] doxepin  50 mg Oral Nightly    budesonide-formoterol  2 puff Inhalation BID    montelukast  10 mg Oral Nightly    levothyroxine  175 mcg Oral Daily    rOPINIRole  1 mg Oral Q24H    oxybutynin  10 mg Oral Daily    rOPINIRole  2 mg Oral Nightly    carvedilol  25 mg Oral BID     PRN Meds: melatonin, ipratropium-albuterol, sodium chloride flush, sodium chloride, ondansetron **OR** ondansetron, polyethylene glycol, acetaminophen **OR** acetaminophen, potassium chloride **OR** potassium alternative oral replacement **OR** potassium chloride, albuterol sulfate HFA, oxyCODONE-acetaminophen, [Held by provider] cyclobenzaprine, glucagon (rDNA), dextrose, glucose, dextrose bolus (hypoglycemia)      Intake/Output Summary (Last 24 hours) at 3/25/2022 0808  Last data filed at 3/25/2022 0430  Gross per 24 hour   Intake 640 ml   Output 3350 ml   Net -2710 ml       Diet:  Diet NPO    Exam:  BP (!) 156/89   Pulse 58   Temp 97 °F (36.1 °C) (Axillary)   Resp 16   Ht 6' 1\" (1.854 m)   Wt (!) 317 lb 10.9 oz (144.1 kg)   SpO2 93%   BMI 41.91 kg/m²     Physical Exam  Vitals and nursing note reviewed. Constitutional:       General: He is awake.       Appearance: Normal appearance. He is morbidly obese. Interventions: Nasal cannula in place. HENT:      Head: Normocephalic and atraumatic. Right Ear: External ear normal.      Left Ear: External ear normal.      Nose: Nose normal.      Mouth/Throat:      Mouth: Mucous membranes are moist.      Pharynx: Oropharynx is clear. Eyes:      Conjunctiva/sclera: Conjunctivae normal.      Pupils: Pupils are equal, round, and reactive to light. Cardiovascular:      Rate and Rhythm: Normal rate and regular rhythm. Pulses: Normal pulses. Dorsalis pedis pulses are 2+ on the right side and 2+ on the left side. Posterior tibial pulses are 2+ on the right side and 2+ on the left side. Heart sounds: Normal heart sounds. Pulmonary:      Effort: Pulmonary effort is normal.      Breath sounds: Normal breath sounds. Decreased air movement present. No wheezing. Abdominal:      General: A surgical scar is present. Bowel sounds are normal.      Palpations: Abdomen is soft. Tenderness: There is no abdominal tenderness. Musculoskeletal:         General: Normal range of motion. Cervical back: Normal range of motion and neck supple. Right lower le+ Pitting Edema present. Left lower le+ Pitting Edema present. Skin:     General: Skin is warm and dry. Capillary Refill: Capillary refill takes less than 2 seconds. Neurological:      General: No focal deficit present. Mental Status: He is alert and oriented to person, place, and time. Mental status is at baseline. GCS: GCS eye subscore is 4. GCS verbal subscore is 5. GCS motor subscore is 6. Psychiatric:         Attention and Perception: Attention and perception normal.         Mood and Affect: Mood and affect normal.         Speech: Speech normal.         Behavior: Behavior normal. Behavior is cooperative. Thought Content:  Thought content normal.         Cognition and Memory: Cognition and memory normal. Judgment: Judgment normal.         Labs:  Recent Labs     03/23/22  0523 03/24/22  0939 03/25/22  0500   WBC 5.7 5.3 5.7   HGB 7.4* 8.3* 8.1*   HCT 24.8* 27.4* 26.3*    291 263     Recent Labs     03/23/22  0523 03/24/22  0515 03/25/22  0500    138 138   K 3.8 3.7 3.5    99 101   CO2 23 25 24   BUN 41* 42* 40*   CREATININE 2.9* 2.5* 2.2*   CALCIUM 8.6 8.8 8.4*     No results for input(s): AST, ALT, BILIDIR, BILITOT, ALKPHOS in the last 72 hours. No results for input(s): INR in the last 72 hours. No results for input(s): Elmo Crane in the last 72 hours. Microbiology:    Urinalysis:   Lab Results   Component Value Date    NITRU NEGATIVE 03/23/2022    WBCUA 5-9 03/23/2022    BACTERIA NONE SEEN 03/23/2022    RBCUA 25-50 03/23/2022    BLOODU MODERATE 03/23/2022    SPECGRAV 1.015 03/23/2022    GLUCOSEU 500 04/13/2021       Radiology:  CT ABDOMEN PELVIS WO CONTRAST Additional Contrast? None   Final Result   Gas and fluid external to the abdomen and near the umbilicus along the patient's hernia mesh level. No evidence of intra-abdominal abscess. No acute abdominal or pelvic abnormalities. Multiple chronic findings. Stable appearance of the lung bases. **This report has been created using voice recognition software. It may contain minor errors which are inherent in voice recognition technology. **      Final report electronically signed by Dr. Marcelo Garnica on 3/19/2022 6:32 PM      XR CHEST PORTABLE   Final Result   Impression:   Cardiomegaly, no CHF. Small right pleural effusion. This document has been electronically signed by: Virginia Desai MD on    03/18/2022 07:27 PM      US RENAL COMPLETE   Final Result       1. Bilateral renal cysts. 2. No definite evidence of hydronephrosis on either side. 3. The bladder was not visualized. .               **This report has been created using voice recognition software.  It may contain minor errors which are inherent in voice recognition technology. **      Final report electronically signed by DR Rebecca Manjarrez on 3/18/2022 11:30 AM      NM LUNG VENT/PERFUSION (VQ)   Final Result   Low probability of pulmonary embolus. Normal ventilation bilaterally with good washout and no areas of trapping. This document has been electronically signed by: Davon Albert MD on    03/16/2022 05:52 PM      CT CHEST WO CONTRAST   Final Result       1. Borderline cardiomegaly. Calcification in the left anterior descending and right coronary arteries. 2. 13 mm metallic density in the left lower lobe pulmonary artery, new since previous CT scan of the chest dated 24th of May 2021. 3. Small right pleural effusion. 4. Small mediastinal lymph nodes. 5. Thickening off the interstitial lung markings. 6. Thoracic spondylosis. 7. Splenomegaly. 8. Small hiatal hernia. **This report has been created using voice recognition software. It may contain minor errors which are inherent in voice recognition technology. **      Final report electronically signed by DR Rebecca Manjarrez on 3/16/2022 12:47 PM      XR CHEST PORTABLE   Final Result   1. Mild cardiomegaly and increased pulmonary vascularity. 2. Otherwise negative chest x-ray. .               **This report has been created using voice recognition software. It may contain minor errors which are inherent in voice recognition technology. **      Final report electronically signed by DR Rebecca Manjarrez on 3/16/2022 11:53 AM          DVT prophylaxis:  [] Lovenox  [] SCDs  [x] SQ Heparin  [] Encourage ambulation  [] Already on anticoagulation  [] Other -      Code Status: Full Code    PT/OT Evaluation Status: N/A    Telemetry:  [x] Yes  [] No    Electronically signed by Tony Lowery DO, MBA on 3/25/2022 at 8:08 AM

## 2022-03-25 NOTE — PLAN OF CARE
Problem: Impaired respiratory status  Goal: Clear lung sounds  Description: Clear lung sounds  Outcome: Ongoing  Note: Continue with txs as ordered to improve breath sounds and decrease work of breathing.

## 2022-03-26 LAB
ANION GAP SERPL CALCULATED.3IONS-SCNC: 12 MEQ/L (ref 8–16)
BUN BLDV-MCNC: 39 MG/DL (ref 7–22)
CALCIUM SERPL-MCNC: 8.7 MG/DL (ref 8.5–10.5)
CHLORIDE BLD-SCNC: 105 MEQ/L (ref 98–111)
CO2: 23 MEQ/L (ref 23–33)
CREAT SERPL-MCNC: 2.2 MG/DL (ref 0.4–1.2)
EKG ATRIAL RATE: 48 BPM
EKG P AXIS: 12 DEGREES
EKG P-R INTERVAL: 174 MS
EKG Q-T INTERVAL: 468 MS
EKG QRS DURATION: 90 MS
EKG QTC CALCULATION (BAZETT): 418 MS
EKG R AXIS: -10 DEGREES
EKG T AXIS: -19 DEGREES
EKG VENTRICULAR RATE: 48 BPM
GFR SERPL CREATININE-BSD FRML MDRD: 31 ML/MIN/1.73M2
GLUCOSE BLD-MCNC: 111 MG/DL (ref 70–108)
MAGNESIUM: 2 MG/DL (ref 1.6–2.4)
POTASSIUM SERPL-SCNC: 4.2 MEQ/L (ref 3.5–5.2)
SODIUM BLD-SCNC: 140 MEQ/L (ref 135–145)

## 2022-03-26 PROCEDURE — 99232 SBSQ HOSP IP/OBS MODERATE 35: CPT | Performed by: INTERNAL MEDICINE

## 2022-03-26 PROCEDURE — 1200000000 HC SEMI PRIVATE

## 2022-03-26 PROCEDURE — 6370000000 HC RX 637 (ALT 250 FOR IP): Performed by: NURSE PRACTITIONER

## 2022-03-26 PROCEDURE — 36415 COLL VENOUS BLD VENIPUNCTURE: CPT

## 2022-03-26 PROCEDURE — 93005 ELECTROCARDIOGRAM TRACING: CPT | Performed by: INTERNAL MEDICINE

## 2022-03-26 PROCEDURE — 99024 POSTOP FOLLOW-UP VISIT: CPT | Performed by: SURGERY

## 2022-03-26 PROCEDURE — 6370000000 HC RX 637 (ALT 250 FOR IP): Performed by: STUDENT IN AN ORGANIZED HEALTH CARE EDUCATION/TRAINING PROGRAM

## 2022-03-26 PROCEDURE — 94640 AIRWAY INHALATION TREATMENT: CPT

## 2022-03-26 PROCEDURE — 80048 BASIC METABOLIC PNL TOTAL CA: CPT

## 2022-03-26 PROCEDURE — 6360000002 HC RX W HCPCS: Performed by: SURGERY

## 2022-03-26 PROCEDURE — 6370000000 HC RX 637 (ALT 250 FOR IP): Performed by: INTERNAL MEDICINE

## 2022-03-26 PROCEDURE — 94760 N-INVAS EAR/PLS OXIMETRY 1: CPT

## 2022-03-26 PROCEDURE — 6360000002 HC RX W HCPCS: Performed by: INTERNAL MEDICINE

## 2022-03-26 PROCEDURE — 99024 POSTOP FOLLOW-UP VISIT: CPT | Performed by: NURSE PRACTITIONER

## 2022-03-26 PROCEDURE — 83735 ASSAY OF MAGNESIUM: CPT

## 2022-03-26 PROCEDURE — 2580000003 HC RX 258: Performed by: INTERNAL MEDICINE

## 2022-03-26 PROCEDURE — APPSS30 APP SPLIT SHARED TIME 16-30 MINUTES: Performed by: NURSE PRACTITIONER

## 2022-03-26 PROCEDURE — 99233 SBSQ HOSP IP/OBS HIGH 50: CPT | Performed by: INTERNAL MEDICINE

## 2022-03-26 RX ORDER — BUMETANIDE 1 MG/1
1 TABLET ORAL DAILY
Status: DISCONTINUED | OUTPATIENT
Start: 2022-03-26 | End: 2022-03-26 | Stop reason: SDUPTHER

## 2022-03-26 RX ORDER — TAMSULOSIN HYDROCHLORIDE 0.4 MG/1
0.4 CAPSULE ORAL NIGHTLY
Status: DISCONTINUED | OUTPATIENT
Start: 2022-03-26 | End: 2022-03-30 | Stop reason: HOSPADM

## 2022-03-26 RX ORDER — AMLODIPINE BESYLATE 10 MG/1
10 TABLET ORAL DAILY
Status: DISCONTINUED | OUTPATIENT
Start: 2022-03-26 | End: 2022-03-30 | Stop reason: HOSPADM

## 2022-03-26 RX ORDER — OXYCODONE HYDROCHLORIDE AND ACETAMINOPHEN 5; 325 MG/1; MG/1
1 TABLET ORAL EVERY 4 HOURS PRN
Status: DISCONTINUED | OUTPATIENT
Start: 2022-03-26 | End: 2022-03-28

## 2022-03-26 RX ORDER — FAMOTIDINE 20 MG/1
20 TABLET, FILM COATED ORAL 2 TIMES DAILY
Status: DISCONTINUED | OUTPATIENT
Start: 2022-03-26 | End: 2022-03-30 | Stop reason: HOSPADM

## 2022-03-26 RX ORDER — BUMETANIDE 1 MG/1
1 TABLET ORAL DAILY
Status: DISCONTINUED | OUTPATIENT
Start: 2022-03-26 | End: 2022-03-30 | Stop reason: HOSPADM

## 2022-03-26 RX ORDER — MAGNESIUM SULFATE IN WATER 40 MG/ML
2000 INJECTION, SOLUTION INTRAVENOUS PRN
Status: DISCONTINUED | OUTPATIENT
Start: 2022-03-26 | End: 2022-03-30 | Stop reason: HOSPADM

## 2022-03-26 RX ADMIN — HEPARIN SODIUM 5000 UNITS: 5000 INJECTION INTRAVENOUS; SUBCUTANEOUS at 16:25

## 2022-03-26 RX ADMIN — HYDROMORPHONE HYDROCHLORIDE 0.5 MG: 1 INJECTION, SOLUTION INTRAMUSCULAR; INTRAVENOUS; SUBCUTANEOUS at 10:04

## 2022-03-26 RX ADMIN — HYDROMORPHONE HYDROCHLORIDE 0.5 MG: 1 INJECTION, SOLUTION INTRAMUSCULAR; INTRAVENOUS; SUBCUTANEOUS at 02:57

## 2022-03-26 RX ADMIN — FAMOTIDINE 20 MG: 20 TABLET ORAL at 21:17

## 2022-03-26 RX ADMIN — HEPARIN SODIUM 5000 UNITS: 5000 INJECTION INTRAVENOUS; SUBCUTANEOUS at 10:04

## 2022-03-26 RX ADMIN — CARVEDILOL 25 MG: 25 TABLET, FILM COATED ORAL at 16:25

## 2022-03-26 RX ADMIN — FAMOTIDINE 20 MG: 20 TABLET ORAL at 14:28

## 2022-03-26 RX ADMIN — OXYBUTYNIN CHLORIDE 10 MG: 10 TABLET, EXTENDED RELEASE ORAL at 10:04

## 2022-03-26 RX ADMIN — MONTELUKAST SODIUM 10 MG: 10 TABLET ORAL at 21:17

## 2022-03-26 RX ADMIN — LEVOTHYROXINE SODIUM 175 MCG: 0.15 TABLET ORAL at 06:21

## 2022-03-26 RX ADMIN — HYDROMORPHONE HYDROCHLORIDE 0.5 MG: 1 INJECTION, SOLUTION INTRAMUSCULAR; INTRAVENOUS; SUBCUTANEOUS at 13:20

## 2022-03-26 RX ADMIN — IPRATROPIUM BROMIDE AND ALBUTEROL SULFATE 1 AMPULE: .5; 3 SOLUTION RESPIRATORY (INHALATION) at 07:40

## 2022-03-26 RX ADMIN — DOXEPIN HYDROCHLORIDE 50 MG: 50 CAPSULE ORAL at 21:16

## 2022-03-26 RX ADMIN — ROPINIROLE HYDROCHLORIDE 2 MG: 1 TABLET, FILM COATED ORAL at 21:17

## 2022-03-26 RX ADMIN — Medication 2000 MG: at 21:18

## 2022-03-26 RX ADMIN — CARVEDILOL 25 MG: 25 TABLET, FILM COATED ORAL at 10:04

## 2022-03-26 RX ADMIN — HYDROMORPHONE HYDROCHLORIDE 0.5 MG: 1 INJECTION, SOLUTION INTRAMUSCULAR; INTRAVENOUS; SUBCUTANEOUS at 06:21

## 2022-03-26 RX ADMIN — OXYCODONE HYDROCHLORIDE AND ACETAMINOPHEN 1 TABLET: 5; 325 TABLET ORAL at 21:17

## 2022-03-26 RX ADMIN — BUDESONIDE AND FORMOTEROL FUMARATE DIHYDRATE 2 PUFF: 160; 4.5 AEROSOL RESPIRATORY (INHALATION) at 17:37

## 2022-03-26 RX ADMIN — SODIUM CHLORIDE: 9 INJECTION, SOLUTION INTRAVENOUS at 06:25

## 2022-03-26 RX ADMIN — Medication 2000 MG: at 04:59

## 2022-03-26 RX ADMIN — IPRATROPIUM BROMIDE AND ALBUTEROL SULFATE 1 AMPULE: .5; 3 SOLUTION RESPIRATORY (INHALATION) at 17:37

## 2022-03-26 RX ADMIN — Medication 2000 MG: at 12:13

## 2022-03-26 RX ADMIN — OXYCODONE HYDROCHLORIDE AND ACETAMINOPHEN 1 TABLET: 5; 325 TABLET ORAL at 14:27

## 2022-03-26 RX ADMIN — BUMETANIDE 1 MG: 1 TABLET ORAL at 12:13

## 2022-03-26 RX ADMIN — ROPINIROLE HYDROCHLORIDE 1 MG: 1 TABLET, FILM COATED ORAL at 14:28

## 2022-03-26 RX ADMIN — HYDROMORPHONE HYDROCHLORIDE 0.5 MG: 1 INJECTION, SOLUTION INTRAMUSCULAR; INTRAVENOUS; SUBCUTANEOUS at 16:25

## 2022-03-26 RX ADMIN — HYDROMORPHONE HYDROCHLORIDE 0.5 MG: 1 INJECTION, SOLUTION INTRAMUSCULAR; INTRAVENOUS; SUBCUTANEOUS at 20:04

## 2022-03-26 RX ADMIN — AMLODIPINE BESYLATE 10 MG: 10 TABLET ORAL at 12:13

## 2022-03-26 RX ADMIN — TAMSULOSIN HYDROCHLORIDE 0.4 MG: 0.4 CAPSULE ORAL at 21:16

## 2022-03-26 RX ADMIN — BUDESONIDE AND FORMOTEROL FUMARATE DIHYDRATE 2 PUFF: 160; 4.5 AEROSOL RESPIRATORY (INHALATION) at 07:41

## 2022-03-26 ASSESSMENT — PAIN DESCRIPTION - LOCATION
LOCATION: ABDOMEN;BACK
LOCATION: ABDOMEN
LOCATION: ABDOMEN

## 2022-03-26 ASSESSMENT — PAIN SCALES - GENERAL
PAINLEVEL_OUTOF10: 7
PAINLEVEL_OUTOF10: 8
PAINLEVEL_OUTOF10: 9
PAINLEVEL_OUTOF10: 9

## 2022-03-26 ASSESSMENT — PAIN - FUNCTIONAL ASSESSMENT: PAIN_FUNCTIONAL_ASSESSMENT: ACTIVITIES ARE NOT PREVENTED

## 2022-03-26 ASSESSMENT — PAIN DESCRIPTION - DESCRIPTORS: DESCRIPTORS: CONSTANT;DISCOMFORT

## 2022-03-26 ASSESSMENT — PAIN DESCRIPTION - DIRECTION: RADIATING_TOWARDS: BACK

## 2022-03-26 ASSESSMENT — PAIN DESCRIPTION - PAIN TYPE
TYPE: SURGICAL PAIN;CHRONIC PAIN
TYPE: SURGICAL PAIN
TYPE: SURGICAL PAIN

## 2022-03-26 ASSESSMENT — PAIN DESCRIPTION - ORIENTATION
ORIENTATION: LOWER;MID
ORIENTATION: LOWER;MID

## 2022-03-26 ASSESSMENT — PAIN DESCRIPTION - ONSET: ONSET: ON-GOING

## 2022-03-26 ASSESSMENT — PAIN DESCRIPTION - PROGRESSION: CLINICAL_PROGRESSION: NOT CHANGED

## 2022-03-26 ASSESSMENT — PAIN DESCRIPTION - FREQUENCY: FREQUENCY: CONTINUOUS

## 2022-03-26 NOTE — PROGRESS NOTES
General Surgery  Dr Christa Kelly  Daily Progress Note      Patient:  Irineo Montalvo      Unit/Bed:5K-24/024-A    YOB: 1960    MRN: [id number]     Acct: [de-identified]     Admit date: 3/16/2022    Subjective: patient sleeping on rounds no distress, awakes easily. States had a lot of nausea post operatively and did not rest well through the night, feeling better this morning. Postoperative pain as expected. Breakthrough drainage noted on dressing. Denies chest pain or SOB today. Chart has been reviewed and update my primary RN, patient anxious to get OOB     All other ROS negative except noted in HPI      Patient Seen, Chart, Consults notes, Labs, Radiology studies reviewed. Past, Family, Social History unchanged from admission. Diet:  ADULT DIET; Clear Liquid; 4 carb choices (60 gm/meal); Low Sodium (2 gm)  ADULT ORAL NUTRITION SUPPLEMENT; Breakfast, Dinner;  Wound Healing Oral Supplement    Medications:  Scheduled Meds:   bumetanide  1 mg Oral Daily    tamsulosin  0.4 mg Oral Nightly    amLODIPine  10 mg Oral Daily    ceFAZolin  3,000 mg IntraVENous 30 Min Pre-Op    ceFAZolin 2000 mg in 20 mL SWFI IV Syringe  2,000 mg IntraVENous Q8H    lidocaine  1 patch TransDERmal Daily    Or    lidocaine  2 patch TransDERmal Daily    Or    lidocaine  3 patch TransDERmal Daily    heparin (porcine)  5,000 Units SubCUTAneous Q8H    ipratropium-albuterol  1 ampule Inhalation BID    sodium chloride flush  5-40 mL IntraVENous 2 times per day    [Held by provider] amitriptyline  10 mg Oral Nightly    doxepin  50 mg Oral Nightly    budesonide-formoterol  2 puff Inhalation BID    montelukast  10 mg Oral Nightly    levothyroxine  175 mcg Oral Daily    rOPINIRole  1 mg Oral Q24H    oxybutynin  10 mg Oral Daily    rOPINIRole  2 mg Oral Nightly    carvedilol  25 mg Oral BID     Continuous Infusions:   sodium chloride      dextrose       PRN Meds:magnesium sulfate, HYDROmorphone **OR** HYDROmorphone, melatonin, ipratropium-albuterol, sodium chloride flush, sodium chloride, ondansetron **OR** ondansetron, polyethylene glycol, acetaminophen **OR** acetaminophen, potassium chloride **OR** potassium alternative oral replacement **OR** potassium chloride, albuterol sulfate HFA, oxyCODONE-acetaminophen, [Held by provider] cyclobenzaprine, glucagon (rDNA), dextrose, glucose, dextrose bolus (hypoglycemia)    Objective:    CBC:   Recent Labs     03/24/22  0939 03/25/22  0500   WBC 5.3 5.7   HGB 8.3* 8.1*    263     BMP:    Recent Labs     03/24/22  0515 03/25/22  0500 03/26/22  0458    138 140   K 3.7 3.5 4.2   CL 99 101 105   CO2 25 24 23   BUN 42* 40* 39*   CREATININE 2.5* 2.2* 2.2*   GLUCOSE 88 88 111*     Calcium:  Recent Labs     03/26/22  0458   CALCIUM 8.7     Ionized Calcium:No results for input(s): IONCA in the last 72 hours. Magnesium:  Recent Labs     03/26/22  0458   MG 2.0     Phosphorus:  No results for input(s): PHOS in the last 72 hours. BNP:No results for input(s): BNP in the last 72 hours. Glucose:  Recent Labs     03/25/22  2348   POCGLU 151*     HgbA1C: No results for input(s): LABA1C in the last 72 hours. INR: No results for input(s): INR in the last 72 hours. Hepatic:   No results for input(s): ALKPHOS, ALT, AST, PROT, BILITOT, BILIDIR, LABALBU in the last 72 hours. Amylase and Lipase:No results for input(s): LACTA, AMYLASE in the last 72 hours. Lactic Acid: No results for input(s): LACTA in the last 72 hours. Troponin:   No results for input(s): CKTOTAL, CKMB, TROPONINT in the last 72 hours. BNP: No results for input(s): BNP in the last 72 hours. Lipids: No results for input(s): CHOL, TRIG, HDL, LDL, LDLCALC in the last 72 hours.   ABGs:   Lab Results   Component Value Date    PH 7.42 03/02/2021    PCO2 41 03/02/2021    PO2 69 03/02/2021    HCO3 27 03/02/2021    O2SAT 94 03/02/2021       Radiology reports as per the Radiologist  Radiology: Rashid Martin CONTRAST    Result Date: 3/16/2022  PROCEDURE: CT CHEST WO CONTRAST CLINICAL INFORMATION: cough. COMPARISON: Chest x-ray obtained on the same day. CT scan dated 24th of May 2021. . TECHNIQUE: 5 mm noncontrast axial images were obtained through the chest. Sagittal and coronal reconstructions were obtained. All CT scans at this facility use dose modulation, iterative reconstruction, and/or weight-based dosing when appropriate to reduce radiation dose to as low as reasonably achievable. FINDINGS: There is borderline cardiomegaly. There is calcification in the left anterior descending and right coronary arteries. . The aorta is of normal caliber. There is a 13 mm metallic density in the left lower lobe pulmonary artery. This appears new since previous CT pulmonary angiogram dated 24th of May 2021. Vonne Dach There is small mediastinal lymph nodes present. There is no pericardial effusion. There is a small right pleural effusion There is thickening of the interstitial lung markings. There appears to be slightly increased fluid along the fissures. There is thoracic spondylosis. . Is a small hiatal hernia There is splenomegaly. 1. Borderline cardiomegaly. Calcification in the left anterior descending and right coronary arteries. 2. 13 mm metallic density in the left lower lobe pulmonary artery, new since previous CT scan of the chest dated 24th of May 2021. 3. Small right pleural effusion. 4. Small mediastinal lymph nodes. 5. Thickening off the interstitial lung markings. 6. Thoracic spondylosis. 7. Splenomegaly. 8. Small hiatal hernia. **This report has been created using voice recognition software. It may contain minor errors which are inherent in voice recognition technology. ** Final report electronically signed by DR Ann Church on 3/16/2022 12:47 PM    NM LUNG VENT/PERFUSION (VQ)    Result Date: 3/16/2022  NM Lung Perfusion/ventilation Comparison: None Findings 3.10 mCi technetium 99m MAA used. 6.80 MCI xenon-133 gas used. Wash-in equilibrium and washout images show normal xenon-133 ventilation scan. The lungs are almost completely clear of xenon within 2 minutes. Perfusion lung images show no defects. Low probability of pulmonary embolus. Normal ventilation bilaterally with good washout and no areas of trapping. This document has been electronically signed by: Abdoulaye Rodriguez MD on 03/16/2022 05:52 PM    XR CHEST PORTABLE    Result Date: 3/16/2022  PROCEDURE: XR CHEST PORTABLE CLINICAL INFORMATION: fever. COMPARISON: Chest x-ray dated third of March 2022. TECHNIQUE: AP upright view of the chest. FINDINGS: There is mild cardiomegaly. .The mediastinum is not widened. There are no pulmonary infiltrates or effusions. The pulmonary vascularity is increased. There is thoracic spondylosis. 1. Mild cardiomegaly and increased pulmonary vascularity. 2. Otherwise negative chest x-ray. . **This report has been created using voice recognition software. It may contain minor errors which are inherent in voice recognition technology. ** Final report electronically signed by DR Zenaida Zamarripa on 3/16/2022 11:53 AM       Physical Exam:  Vitals: BP (!) 154/83   Pulse 66   Temp 97.5 °F (36.4 °C) (Oral)   Resp 18   Ht 6' 1\" (1.854 m)   Wt (!) 317 lb 10.9 oz (144.1 kg)   SpO2 95%   BMI 41.91 kg/m²   24 hour intake/output:    Intake/Output Summary (Last 24 hours) at 3/26/2022 1212  Last data filed at 3/26/2022 1208  Gross per 24 hour   Intake 1357 ml   Output 2280 ml   Net -923 ml     Last 3 weights: Wt Readings from Last 3 Encounters:   03/24/22 (!) 317 lb 10.9 oz (144.1 kg)   03/03/22 (!) 333 lb 6.4 oz (151.2 kg)   02/15/22 (!) 338 lb 9.6 oz (153.6 kg)       General appearance - oriented to person, place, and time and overweight  HEENT: Normocephalic and Atraumatic  Chest - clear to auscultation, no wheezes, rales or rhonchi, symmetric air entry  Cardiovascular - normal rate and regular rhythm  Abdomen - BS hypoactive.  Soft, round  Surgical Site: postop dressing with breakthrough drainage noted   Neurological - Alert and oriented and Normal speech  Integumentary - Skin color, texture, turgor normal. No Rashes or lesions  Musculoskeletal -Full ROM times 4 extremities      DVT prophylaxis: [] Lovenox                                 [] SCDs                                 [x] SQ Heparin                                 [] Encourage ambulation           [] Already on Anticoagulation           X Aspirin     Assessment:  1. S/p removal of portion of infected mesh, lysis of significant adhesions  2. Postoperative pain as expected   3. CAROLE on CKD  4. CHF- HTN   5. COPD  6. DM  7. LAURIE  8. Chronic back pain - percocet   9. 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 *      Plan:    1. Clear liquid diet   2. Analgesia and antiemetics as needed  3. Antibiotic therapy  Ancef   4. Monitor Labs, lytes per protocol  5. medicine management   6. Nephrology and cardiology following   7. Sub q heparin prophylaxis   8. GI prophylaxis   9. Discharge planning pending progress       Electronically signed by SAHIL Ibanez CNP on 3/26/2022 at 12:12 PM Patient seen and examined independently by me. Above discussed and I agree with CNP. Labs, cultures, and radiographs where available were reviewed. See orders for the updated patient care plan.     Amairani Lane MD MD, patient is just ambulating in the hallway discussed the OR findings with patient will monitor clear liquids  3/26/2022   12:33 PM

## 2022-03-26 NOTE — PROGRESS NOTES
[facility]   Nephrology progress note  3/26/2022, 12:39 PM      Pt Name:    Dimas Chavez  MRN:     [id number]     YOB: 1960  Admit Date:    3/16/2022 10:41 AM  Primary Care Physician:  Toshia Choi DO   Room number  1B-92/243-U    Chief Complaint: Nephrology following for CAROLE/CKD    Subjective:  Patient seen and examined  Seen earlier today during rounds  Had surgery yesterday  No chest pain  No shortness of breath        Objective:  24HR INTAKE/OUTPUT:      Intake/Output Summary (Last 24 hours) at 3/26/2022 1239  Last data filed at 3/26/2022 1208  Gross per 24 hour   Intake 1357 ml   Output 2280 ml   Net -923 ml     I/O last 3 completed shifts: In: 0338 [P.O.:100; I.V.:1257]  Out: 1255 [Urine:3230; Blood:200]  I/O this shift: In: 0   Out: 500 [Urine:500]  Admission weight: (!) 328 lb (148.8 kg)  Wt Readings from Last 3 Encounters:   03/24/22 (!) 317 lb 10.9 oz (144.1 kg)   03/03/22 (!) 333 lb 6.4 oz (151.2 kg)   02/15/22 (!) 338 lb 9.6 oz (153.6 kg)     Body mass index is 41.91 kg/m².     Physical examination  VITALS:     Vitals:    03/26/22 0254 03/26/22 0743 03/26/22 0945 03/26/22 1200   BP: (!) 159/77  (!) 168/73 (!) 154/83   Pulse: 53  53 66   Resp: 18  18    Temp: 97.6 °F (36.4 °C)  97.5 °F (36.4 °C)    TempSrc: Oral  Oral    SpO2: 95% 95% 95%    Weight:       Height:         General Appearance: alert and cooperative with exam, appears comfortable, no distress  Mouth/Throat: Oral mucosa moist  Lungs: No use of accessory muscles, on room air  Extremities: No significant lower extremity edema      Lab Data  CBC:   Recent Labs     03/24/22  0939 03/25/22  0500   WBC 5.3 5.7   HGB 8.3* 8.1*   HCT 27.4* 26.3*    263     BMP:  Recent Labs     03/24/22  0515 03/25/22  0500 03/26/22  0458    138 140   K 3.7 3.5 4.2   CL 99 101 105   CO2 25 24 23   BUN 42* 40* 39*   CREATININE 2.5* 2.2* 2.2*   GLUCOSE 88 88 111*   CALCIUM 8.8 8.4* 8.7   MG  --   --  2.0 Hepatic: No results for input(s): LABALBU, AST, ALT, ALB, BILITOT, ALKPHOS in the last 72 hours. Meds:  Infusion:    sodium chloride      dextrose       Meds:    bumetanide  1 mg Oral Daily    tamsulosin  0.4 mg Oral Nightly    amLODIPine  10 mg Oral Daily    famotidine  20 mg Oral BID    ceFAZolin  3,000 mg IntraVENous 30 Min Pre-Op    ceFAZolin 2000 mg in 20 mL SWFI IV Syringe  2,000 mg IntraVENous Q8H    lidocaine  1 patch TransDERmal Daily    Or    lidocaine  2 patch TransDERmal Daily    Or    lidocaine  3 patch TransDERmal Daily    heparin (porcine)  5,000 Units SubCUTAneous Q8H    ipratropium-albuterol  1 ampule Inhalation BID    sodium chloride flush  5-40 mL IntraVENous 2 times per day    [Held by provider] amitriptyline  10 mg Oral Nightly    doxepin  50 mg Oral Nightly    budesonide-formoterol  2 puff Inhalation BID    montelukast  10 mg Oral Nightly    levothyroxine  175 mcg Oral Daily    rOPINIRole  1 mg Oral Q24H    oxybutynin  10 mg Oral Daily    rOPINIRole  2 mg Oral Nightly    carvedilol  25 mg Oral BID     Meds prn: magnesium sulfate, oxyCODONE-acetaminophen, HYDROmorphone **OR** HYDROmorphone, melatonin, ipratropium-albuterol, sodium chloride flush, sodium chloride, ondansetron **OR** ondansetron, polyethylene glycol, acetaminophen **OR** acetaminophen, potassium chloride **OR** potassium alternative oral replacement **OR** potassium chloride, albuterol sulfate HFA, [Held by provider] cyclobenzaprine, glucagon (rDNA), dextrose, glucose, dextrose bolus (hypoglycemia)       Impression and Plan:  1. CAROLE on CKD 3A.   Baseline ~ 1.3  Multiple risk factors for CKD including chronic diastolic heart failure/cardiorenal, volume depletion from diarrhea  Likely has sustained CAROLE from ATN, underlying CKD due to diabetic nephropathy and also due to post adaptive FSGS from morbid obesity  Overall improved renal function  Reduce IV fluids and will plan to stop tomorrow as oral intake improves    2. Volume overload. Improved  3. Proteinuria secondary to diabetic nephropathy +/-secondary FSGS (post adaptive from obesity)  4. Hypokalemia. Secondary to recent diuretics, replace as needed. 5.  Abdominal wound/infected hernia mesh. Status post surgery: Lysis of adhesions, explantation of infected mesh  6. Chronic diastolic dysfunction with volume overload. Plan as above  7. Chronically uncontrolled diabetes mellitus  8. Essential hypertension. Stable with Coreg and hydralazine.      D/W patient     Kirsten Diaz MD  [facility]

## 2022-03-26 NOTE — PLAN OF CARE
Problem: Pain:  Goal: Pain level will decrease  Description: Pain level will decrease  Outcome: Ongoing   Pain Assessment: 0-10  Pain Level: 8   Patient's Stated Pain Goal: 5   Is pain goal met at this time? No   Patient is receiving Dilaudid 0.5mg q 3 hours PRN for pain. Will continue to monitor and reassess. Non-Pharmaceutical Pain Intervention(s): Rest,Repositioned   Problem: Falls - Risk of:  Goal: Will remain free from falls  Description: Will remain free from falls  Outcome: Ongoing   All fall precautions in place. Bed in low position, alarm activated and appropriate use of call light. Problem: Discharge Planning:  Goal: Discharged to appropriate level of care  Description: Discharged to appropriate level of care  Outcome: Ongoing   Discharge date is unclear, plan is to return to home with wife. Problem: Skin Integrity:  Goal: Absence of new skin breakdown  Description: Absence of new skin breakdown  Outcome: Ongoing   Skin assessment completed. Patient turned every 2 hours and as needed. No skin breakdown this shift. Incision has old drainage noted, marked and will monitor for further drainage. Patient will be encouraged to walk in halls, use IS, and take CHG bath. Care plan reviewed with patient. Patient verbalizes understanding of the plan of care and contributes to goal setting.

## 2022-03-26 NOTE — RT PROTOCOL NOTE
RT Inhaler-Nebulizer Bronchodilator Protocol Note    There is a bronchodilator order in the chart from a provider indicating to follow the RT Bronchodilator Protocol and there is an Initiate RT Inhaler-Nebulizer Bronchodilator Protocol order as well (see protocol at bottom of note). CXR Findings:  No results found. The findings from the last RT Protocol Assessment were as follows:   History Pulmonary Disease: None or smoker <15 pack years  Respiratory Pattern: Regular pattern and RR 12-20 bpm  Breath Sounds: Slightly diminished and/or crackles  Cough: Strong, spontaneous, non-productive  Indication for Bronchodilator Therapy: On home bronchodilators  Bronchodilator Assessment Score: 2    Aerosolized bronchodilator medication orders have been revised according to the RT Inhaler-Nebulizer Bronchodilator Protocol below. Respiratory Therapist to perform RT Therapy Protocol Assessment initially then follow the protocol. Repeat RT Therapy Protocol Assessment PRN for score 0-3 or on second treatment, BID, and PRN for scores above 3. No Indications - adjust the frequency to every 6 hours PRN wheezing or bronchospasm, if no treatments needed after 48 hours then discontinue using Per Protocol order mode. If indication present, adjust the RT bronchodilator orders based on the Bronchodilator Assessment Score as indicated below. Use Inhaler orders unless patient has one or more of the following: on home nebulizer, not able to hold breath for 10 seconds, is not alert and oriented, cannot activate and use MDI correctly, or respiratory rate 25 breaths per minute or more, then use the equivalent nebulizer order(s) with same Frequency and PRN reasons based on the score. If a patient is on this medication at home then do not decrease Frequency below that used at home.     0-3 - enter or revise RT bronchodilator order(s) to equivalent RT Bronchodilator order with Frequency of every 4 hours PRN for wheezing or increased work of breathing using Per Protocol order mode. 4-6 - enter or revise RT Bronchodilator order(s) to two equivalent RT bronchodilator orders with one order with BID Frequency and one order with Frequency of every 4 hours PRN wheezing or increased work of breathing using Per Protocol order mode. 7-10 - enter or revise RT Bronchodilator order(s) to two equivalent RT bronchodilator orders with one order with TID Frequency and one order with Frequency of every 4 hours PRN wheezing or increased work of breathing using Per Protocol order mode. 11-13 - enter or revise RT Bronchodilator order(s) to one equivalent RT bronchodilator order with QID Frequency and an Albuterol order with Frequency of every 4 hours PRN wheezing or increased work of breathing using Per Protocol order mode. Greater than 13 - enter or revise RT Bronchodilator order(s) to one equivalent RT bronchodilator order with every 4 hours Frequency and an Albuterol order with Frequency of every 2 hours PRN wheezing or increased work of breathing using Per Protocol order mode. RT to enter RT Home Evaluation for COPD & MDI Assessment order using Per Protocol order mode.     Electronically signed by Mariola Alvarez RCP on 3/26/2022 at 7:46 AM

## 2022-03-26 NOTE — OP NOTE
sutures. DESCRIPTION OF PROCEDURE:  The patient was brought to the operating  suite, placed supine on the operating room table. After adequate  inhalational anesthesia was administered, the patient's abdomen was  prepped and draped in usual sterile fashion. Incision was made in  elliptical fashion to include the opening where the mesh was exposed and  we went down on the patient's left side first and got down to the mesh  material and then actually entered into the abdominal cavity fairly  easily. Then began some tedious lysis of adhesions of bowel up to the  mesh and we did this on both sides and then we removed the mesh from the  right abdominal wall and removed this section of mesh along with some  scar tissue. We then continued to lyse adhesions of small bowel off the  residual mesh and there was another area where the mesh was infected and  it was excised. However, we did not feel that there was a need to  explant all of the mesh. This was basically made an opening that would  be unable to close and the mesh actually looked quite healthy. We  irrigated with Irrisept. There had been one area where we had  reinforced the serosa but then we suctioned the Irrisept out. We then  closed the fascia and scar tissue and mesh with #1 Novafil suture. We  irrigated the subcutaneous and then used staples to close the skin. The  patient tolerated the procedure well. KIET LYLES [facility], MD    D: 03/25/2022 15:36:28       T: 03/25/2022 15:39:54     [email or web address]  Job#: [id number]     Doc#: [id number]    CC:

## 2022-03-26 NOTE — PROGRESS NOTES
Assessment and Plan:        1. Mesh infection- s/p removal POD1- doing well. Expected pain. 2. hbp- add norvasc  3. Ventricular tachycardia- this am- check mag. Continue to monitor. He has fairly normal coronaries. 4. Dm- sugars good on diet alone; will have dietitian instruct. He was on a sulfonylurea at home. 5. carole-creatinine appears to have a new level in the 2s. Nephrology followingl      CC:  Abdominal pain  HPI:  Pt with prior hx of abdominal operations, presented with mesh infection. Also had CAROLE,hx dm,, hbp, LAURIE. Went to OR 3.25.    ROS (12 point review of systems completed. Pertinent positives noted.  Otherwise ROS is negative) : chronic back  PMH:  Per HPI  SHX:    FHX: heart, cva, dm  Allergies: See above    Medications:     sodium chloride      dextrose        bumetanide  1 mg Oral Daily    tamsulosin  0.4 mg Oral Nightly    bumetanide  1 mg Oral Daily    amLODIPine  10 mg Oral Daily    magnesium replacement protocol   Other RX Placeholder    ceFAZolin  3,000 mg IntraVENous 30 Min Pre-Op    ceFAZolin 2000 mg in 20 mL SWFI IV Syringe  2,000 mg IntraVENous Q8H    lidocaine  1 patch TransDERmal Daily    Or    lidocaine  2 patch TransDERmal Daily    Or    lidocaine  3 patch TransDERmal Daily    heparin (porcine)  5,000 Units SubCUTAneous Q8H    ipratropium-albuterol  1 ampule Inhalation BID    sodium chloride flush  5-40 mL IntraVENous 2 times per day    [Held by provider] amitriptyline  10 mg Oral Nightly    doxepin  50 mg Oral Nightly    budesonide-formoterol  2 puff Inhalation BID    montelukast  10 mg Oral Nightly    levothyroxine  175 mcg Oral Daily    rOPINIRole  1 mg Oral Q24H    oxybutynin  10 mg Oral Daily    rOPINIRole  2 mg Oral Nightly    carvedilol  25 mg Oral BID       Vital Signs:   BP (!) 168/73   Pulse 53   Temp 97.5 °F (36.4 °C) (Oral)   Resp 18   Ht 6' 1\" (1.854 m)   Wt (!) 317 lb 10.9 oz (144.1 kg)   SpO2 95%   BMI 41.91 kg/m² Intake/Output Summary (Last 24 hours) at 3/26/2022 1033  Last data filed at 3/26/2022 0154  Gross per 24 hour   Intake 1357 ml   Output 2880 ml   Net -1523 ml        Physical Examination: General appearance - alert, well appearing, and in no distress and overweight  Mental status - alert, oriented to person, place, and time  Neck - supple, no significant adenopathy, no JVD, or carotid bruits  Chest - clear to auscultation, no wheezes, rales or rhonchi, symmetric air entry  Heart - normal rate, regular rhythm, normal S1, S2, no murmurs, rubs, clicks or gallops  Abdomen - binder in place  Neurological - alert, oriented, normal speech, no focal findings or movement disorder noted  Musculoskeletal - no joint tenderness, deformity or swelling  Extremities - peripheral pulses normal, no pedal edema, no clubbing or cyanosis  Skin - normal coloration and turgor, no rashes, no suspicious skin lesions noted    Data: (All radiographs, tracings, PFTs, and imaging are personally viewed and interpreted unless otherwise noted).           Electronically signed by Maria Elena Loving MD on 3/26/2022 at 10:33 AM

## 2022-03-27 LAB
ANION GAP SERPL CALCULATED.3IONS-SCNC: 12 MEQ/L (ref 8–16)
BUN BLDV-MCNC: 36 MG/DL (ref 7–22)
CALCIUM SERPL-MCNC: 8.8 MG/DL (ref 8.5–10.5)
CHLORIDE BLD-SCNC: 106 MEQ/L (ref 98–111)
CO2: 24 MEQ/L (ref 23–33)
CREAT SERPL-MCNC: 2 MG/DL (ref 0.4–1.2)
GFR SERPL CREATININE-BSD FRML MDRD: 34 ML/MIN/1.73M2
GLUCOSE BLD-MCNC: 81 MG/DL (ref 70–108)
POTASSIUM SERPL-SCNC: 3.9 MEQ/L (ref 3.5–5.2)
SODIUM BLD-SCNC: 142 MEQ/L (ref 135–145)

## 2022-03-27 PROCEDURE — 6370000000 HC RX 637 (ALT 250 FOR IP): Performed by: NURSE PRACTITIONER

## 2022-03-27 PROCEDURE — 2580000003 HC RX 258: Performed by: STUDENT IN AN ORGANIZED HEALTH CARE EDUCATION/TRAINING PROGRAM

## 2022-03-27 PROCEDURE — 6370000000 HC RX 637 (ALT 250 FOR IP): Performed by: STUDENT IN AN ORGANIZED HEALTH CARE EDUCATION/TRAINING PROGRAM

## 2022-03-27 PROCEDURE — 6370000000 HC RX 637 (ALT 250 FOR IP): Performed by: INTERNAL MEDICINE

## 2022-03-27 PROCEDURE — 99024 POSTOP FOLLOW-UP VISIT: CPT | Performed by: SURGERY

## 2022-03-27 PROCEDURE — 80048 BASIC METABOLIC PNL TOTAL CA: CPT

## 2022-03-27 PROCEDURE — 1200000000 HC SEMI PRIVATE

## 2022-03-27 PROCEDURE — 99232 SBSQ HOSP IP/OBS MODERATE 35: CPT | Performed by: INTERNAL MEDICINE

## 2022-03-27 PROCEDURE — 6360000002 HC RX W HCPCS: Performed by: INTERNAL MEDICINE

## 2022-03-27 PROCEDURE — 94640 AIRWAY INHALATION TREATMENT: CPT

## 2022-03-27 PROCEDURE — 99222 1ST HOSP IP/OBS MODERATE 55: CPT | Performed by: INTERNAL MEDICINE

## 2022-03-27 PROCEDURE — 6360000002 HC RX W HCPCS: Performed by: SURGERY

## 2022-03-27 PROCEDURE — 36415 COLL VENOUS BLD VENIPUNCTURE: CPT

## 2022-03-27 RX ORDER — CARVEDILOL 25 MG/1
50 TABLET ORAL 2 TIMES DAILY
Status: DISCONTINUED | OUTPATIENT
Start: 2022-03-27 | End: 2022-03-30 | Stop reason: HOSPADM

## 2022-03-27 RX ORDER — POLYETHYLENE GLYCOL 3350 17 G/17G
17 POWDER, FOR SOLUTION ORAL DAILY
Status: DISCONTINUED | OUTPATIENT
Start: 2022-03-27 | End: 2022-03-30 | Stop reason: HOSPADM

## 2022-03-27 RX ADMIN — ROPINIROLE HYDROCHLORIDE 1 MG: 1 TABLET, FILM COATED ORAL at 12:58

## 2022-03-27 RX ADMIN — TAMSULOSIN HYDROCHLORIDE 0.4 MG: 0.4 CAPSULE ORAL at 21:36

## 2022-03-27 RX ADMIN — IPRATROPIUM BROMIDE AND ALBUTEROL SULFATE 1 AMPULE: .5; 3 SOLUTION RESPIRATORY (INHALATION) at 19:54

## 2022-03-27 RX ADMIN — NALOXEGOL OXALATE 12.5 MG: 12.5 TABLET, FILM COATED ORAL at 09:38

## 2022-03-27 RX ADMIN — BUMETANIDE 1 MG: 1 TABLET ORAL at 10:05

## 2022-03-27 RX ADMIN — ROPINIROLE HYDROCHLORIDE 2 MG: 1 TABLET, FILM COATED ORAL at 21:36

## 2022-03-27 RX ADMIN — IPRATROPIUM BROMIDE AND ALBUTEROL SULFATE 1 AMPULE: .5; 3 SOLUTION RESPIRATORY (INHALATION) at 09:56

## 2022-03-27 RX ADMIN — Medication 2000 MG: at 21:00

## 2022-03-27 RX ADMIN — LEVOTHYROXINE SODIUM 175 MCG: 0.15 TABLET ORAL at 09:39

## 2022-03-27 RX ADMIN — HYDROMORPHONE HYDROCHLORIDE 0.5 MG: 1 INJECTION, SOLUTION INTRAMUSCULAR; INTRAVENOUS; SUBCUTANEOUS at 21:00

## 2022-03-27 RX ADMIN — HYDROMORPHONE HYDROCHLORIDE 0.5 MG: 1 INJECTION, SOLUTION INTRAMUSCULAR; INTRAVENOUS; SUBCUTANEOUS at 10:59

## 2022-03-27 RX ADMIN — HEPARIN SODIUM 5000 UNITS: 5000 INJECTION INTRAVENOUS; SUBCUTANEOUS at 09:42

## 2022-03-27 RX ADMIN — HYDROMORPHONE HYDROCHLORIDE 0.5 MG: 1 INJECTION, SOLUTION INTRAMUSCULAR; INTRAVENOUS; SUBCUTANEOUS at 04:35

## 2022-03-27 RX ADMIN — Medication 2000 MG: at 04:36

## 2022-03-27 RX ADMIN — FAMOTIDINE 20 MG: 20 TABLET ORAL at 21:36

## 2022-03-27 RX ADMIN — HEPARIN SODIUM 5000 UNITS: 5000 INJECTION INTRAVENOUS; SUBCUTANEOUS at 01:11

## 2022-03-27 RX ADMIN — FAMOTIDINE 20 MG: 20 TABLET ORAL at 09:39

## 2022-03-27 RX ADMIN — BUDESONIDE AND FORMOTEROL FUMARATE DIHYDRATE 2 PUFF: 160; 4.5 AEROSOL RESPIRATORY (INHALATION) at 19:54

## 2022-03-27 RX ADMIN — OXYCODONE HYDROCHLORIDE AND ACETAMINOPHEN 1 TABLET: 5; 325 TABLET ORAL at 09:52

## 2022-03-27 RX ADMIN — HEPARIN SODIUM 5000 UNITS: 5000 INJECTION INTRAVENOUS; SUBCUTANEOUS at 16:27

## 2022-03-27 RX ADMIN — SODIUM CHLORIDE, PRESERVATIVE FREE 10 ML: 5 INJECTION INTRAVENOUS at 21:36

## 2022-03-27 RX ADMIN — HYDROMORPHONE HYDROCHLORIDE 0.5 MG: 1 INJECTION, SOLUTION INTRAMUSCULAR; INTRAVENOUS; SUBCUTANEOUS at 07:52

## 2022-03-27 RX ADMIN — MONTELUKAST SODIUM 10 MG: 10 TABLET ORAL at 21:36

## 2022-03-27 RX ADMIN — SODIUM CHLORIDE, PRESERVATIVE FREE 10 ML: 5 INJECTION INTRAVENOUS at 10:06

## 2022-03-27 RX ADMIN — Medication 2000 MG: at 11:41

## 2022-03-27 RX ADMIN — OXYCODONE HYDROCHLORIDE AND ACETAMINOPHEN 1 TABLET: 5; 325 TABLET ORAL at 15:41

## 2022-03-27 RX ADMIN — CARVEDILOL 50 MG: 25 TABLET, FILM COATED ORAL at 16:25

## 2022-03-27 RX ADMIN — OXYBUTYNIN CHLORIDE 10 MG: 10 TABLET, EXTENDED RELEASE ORAL at 09:38

## 2022-03-27 RX ADMIN — CARVEDILOL 25 MG: 25 TABLET, FILM COATED ORAL at 09:42

## 2022-03-27 RX ADMIN — HYDROMORPHONE HYDROCHLORIDE 0.5 MG: 1 INJECTION, SOLUTION INTRAMUSCULAR; INTRAVENOUS; SUBCUTANEOUS at 01:17

## 2022-03-27 RX ADMIN — POLYETHYLENE GLYCOL 3350 17 G: 17 POWDER, FOR SOLUTION ORAL at 09:43

## 2022-03-27 RX ADMIN — DOXEPIN HYDROCHLORIDE 50 MG: 50 CAPSULE ORAL at 21:36

## 2022-03-27 RX ADMIN — HYDROMORPHONE HYDROCHLORIDE 0.5 MG: 1 INJECTION, SOLUTION INTRAMUSCULAR; INTRAVENOUS; SUBCUTANEOUS at 14:11

## 2022-03-27 RX ADMIN — AMLODIPINE BESYLATE 10 MG: 10 TABLET ORAL at 09:41

## 2022-03-27 RX ADMIN — HYDROMORPHONE HYDROCHLORIDE 0.5 MG: 1 INJECTION, SOLUTION INTRAMUSCULAR; INTRAVENOUS; SUBCUTANEOUS at 17:11

## 2022-03-27 RX ADMIN — BUDESONIDE AND FORMOTEROL FUMARATE DIHYDRATE 2 PUFF: 160; 4.5 AEROSOL RESPIRATORY (INHALATION) at 09:56

## 2022-03-27 ASSESSMENT — PAIN SCALES - GENERAL
PAINLEVEL_OUTOF10: 9
PAINLEVEL_OUTOF10: 8
PAINLEVEL_OUTOF10: 8
PAINLEVEL_OUTOF10: 9
PAINLEVEL_OUTOF10: 9
PAINLEVEL_OUTOF10: 8
PAINLEVEL_OUTOF10: 9
PAINLEVEL_OUTOF10: 8
PAINLEVEL_OUTOF10: 7
PAINLEVEL_OUTOF10: 9
PAINLEVEL_OUTOF10: 8
PAINLEVEL_OUTOF10: 9
PAINLEVEL_OUTOF10: 7

## 2022-03-27 ASSESSMENT — PAIN DESCRIPTION - PAIN TYPE
TYPE: SURGICAL PAIN
TYPE: SURGICAL PAIN

## 2022-03-27 ASSESSMENT — PAIN DESCRIPTION - LOCATION
LOCATION: ABDOMEN
LOCATION: ABDOMEN

## 2022-03-27 NOTE — PROGRESS NOTES
[facility]   Nephrology progress note  3/27/2022, 12:01 PM      Pt Name:    Home Alarcon  MRN:     [id number]     YOB: 1960  Admit Date:    3/16/2022 10:41 AM  Primary Care Physician:  Margie Mosquera DO   Room number  7J-34/677-M    Chief Complaint: Nephrology following for CAROLE/CKD    Subjective:  Patient seen and examined  Seen earlier today during rounds  On clear liquids  No chest pain  No shortness of breath  Sitting at his bedside chair        Objective:  24HR INTAKE/OUTPUT:      Intake/Output Summary (Last 24 hours) at 3/27/2022 1201  Last data filed at 3/27/2022 1151  Gross per 24 hour   Intake 1200 ml   Output 3375 ml   Net -2175 ml     I/O last 3 completed shifts: In: 9594 [P.O.:1300; I.V.:1257]  Out: 9699 [VQMRR:5975]  I/O this shift:  In: -   Out: 900 [Urine:900]  Admission weight: (!) 328 lb (148.8 kg)  Wt Readings from Last 3 Encounters:   03/24/22 (!) 317 lb 10.9 oz (144.1 kg)   03/03/22 (!) 333 lb 6.4 oz (151.2 kg)   02/15/22 (!) 338 lb 9.6 oz (153.6 kg)     Body mass index is 41.91 kg/m².     Physical examination  VITALS:     Vitals:    03/27/22 0752 03/27/22 0941 03/27/22 1130 03/27/22 1145   BP: (!) 171/89 (!) 171/89  (!) 168/82   Pulse: 67  74    Resp: 18  18    Temp: 98.7 °F (37.1 °C)  97.7 °F (36.5 °C)    TempSrc: Oral  Oral    SpO2: 95%  93%    Weight:       Height:         General Appearance: alert and cooperative with exam, appears comfortable, no distress  Mouth/Throat: Oral mucosa moist  Lungs: No use of accessory muscles, on room air  Extremities: No significant lower extremity edema  Abd: soft      Lab Data  CBC:   Recent Labs     03/25/22  0500   WBC 5.7   HGB 8.1*   HCT 26.3*        BMP:  Recent Labs     03/25/22  0500 03/26/22  0458 03/27/22  0538    140 142   K 3.5 4.2 3.9    105 106   CO2 24 23 24   BUN 40* 39* 36*   CREATININE 2.2* 2.2* 2.0*   GLUCOSE 88 111* 81   CALCIUM 8.4* 8.7 8.8   MG  --  2.0  --      Hepatic: No results for input(s): LABALBU, AST, ALT, ALB, BILITOT, ALKPHOS in the last 72 hours. Meds:  Infusion:    sodium chloride      dextrose       Meds:    naloxegol  12.5 mg Oral QAM    polyethylene glycol  17 g Oral Daily    bumetanide  1 mg Oral Daily    tamsulosin  0.4 mg Oral Nightly    amLODIPine  10 mg Oral Daily    famotidine  20 mg Oral BID    ceFAZolin  3,000 mg IntraVENous 30 Min Pre-Op    ceFAZolin 2000 mg in 20 mL SWFI IV Syringe  2,000 mg IntraVENous Q8H    lidocaine  1 patch TransDERmal Daily    Or    lidocaine  2 patch TransDERmal Daily    Or    lidocaine  3 patch TransDERmal Daily    heparin (porcine)  5,000 Units SubCUTAneous Q8H    ipratropium-albuterol  1 ampule Inhalation BID    sodium chloride flush  5-40 mL IntraVENous 2 times per day    [Held by provider] amitriptyline  10 mg Oral Nightly    doxepin  50 mg Oral Nightly    budesonide-formoterol  2 puff Inhalation BID    montelukast  10 mg Oral Nightly    levothyroxine  175 mcg Oral Daily    rOPINIRole  1 mg Oral Q24H    oxybutynin  10 mg Oral Daily    rOPINIRole  2 mg Oral Nightly    carvedilol  25 mg Oral BID     Meds prn: magnesium sulfate, oxyCODONE-acetaminophen, HYDROmorphone **OR** HYDROmorphone, melatonin, ipratropium-albuterol, sodium chloride flush, sodium chloride, ondansetron **OR** ondansetron, acetaminophen **OR** acetaminophen, potassium chloride **OR** potassium alternative oral replacement **OR** potassium chloride, albuterol sulfate HFA, [Held by provider] cyclobenzaprine, glucagon (rDNA), dextrose, glucose, dextrose bolus (hypoglycemia)       Impression and Plan:  1. CAROLE on CKD 3A. Baseline ~ 1.3  Multiple risk factors for CKD including chronic diastolic heart failure/cardiorenal, volume depletion from diarrhea  Likely has sustained CAROLE from ATN, underlying CKD due to diabetic nephropathy and also due to post adaptive FSGS from morbid obesity  Off IV fluids      2. Volume overload. Improved  3. Proteinuria secondary to diabetic nephropathy +/-secondary FSGS (post adaptive from obesity)  4. Hypokalemia. Secondary to recent diuretics, replace as needed. 5.  Abdominal wound/infected hernia mesh. Status post surgery: Lysis of adhesions, explantation of infected mesh  6. Chronic diastolic dysfunction with volume overload. Off IV fluids, continue with Bumex  7. Chronically uncontrolled diabetes mellitus  8. Essential hypertension. Stable with Coreg and hydralazine.      D/W patient     Litzy Bautista MD  [facility]

## 2022-03-27 NOTE — RT PROTOCOL NOTE
RT Inhaler-Nebulizer Bronchodilator Protocol Note    There is a bronchodilator order in the chart from a provider indicating to follow the RT Bronchodilator Protocol and there is an Initiate RT Inhaler-Nebulizer Bronchodilator Protocol order as well (see protocol at bottom of note). CXR Findings:  No results found. The findings from the last RT Protocol Assessment were as follows:   History Pulmonary Disease: None or smoker <15 pack years  Respiratory Pattern: Regular pattern and RR 12-20 bpm  Breath Sounds: Slightly diminished and/or crackles  Cough: Strong, spontaneous, non-productive  Indication for Bronchodilator Therapy: On home bronchodilators  Bronchodilator Assessment Score: 2    Aerosolized bronchodilator medication orders have been revised according to the RT Inhaler-Nebulizer Bronchodilator Protocol below. Respiratory Therapist to perform RT Therapy Protocol Assessment initially then follow the protocol. Repeat RT Therapy Protocol Assessment PRN for score 0-3 or on second treatment, BID, and PRN for scores above 3. No Indications - adjust the frequency to every 6 hours PRN wheezing or bronchospasm, if no treatments needed after 48 hours then discontinue using Per Protocol order mode. If indication present, adjust the RT bronchodilator orders based on the Bronchodilator Assessment Score as indicated below. Use Inhaler orders unless patient has one or more of the following: on home nebulizer, not able to hold breath for 10 seconds, is not alert and oriented, cannot activate and use MDI correctly, or respiratory rate 25 breaths per minute or more, then use the equivalent nebulizer order(s) with same Frequency and PRN reasons based on the score. If a patient is on this medication at home then do not decrease Frequency below that used at home.     0-3 - enter or revise RT bronchodilator order(s) to equivalent RT Bronchodilator order with Frequency of every 4 hours PRN for wheezing or increased work of breathing using Per Protocol order mode. 4-6 - enter or revise RT Bronchodilator order(s) to two equivalent RT bronchodilator orders with one order with BID Frequency and one order with Frequency of every 4 hours PRN wheezing or increased work of breathing using Per Protocol order mode. 7-10 - enter or revise RT Bronchodilator order(s) to two equivalent RT bronchodilator orders with one order with TID Frequency and one order with Frequency of every 4 hours PRN wheezing or increased work of breathing using Per Protocol order mode. 11-13 - enter or revise RT Bronchodilator order(s) to one equivalent RT bronchodilator order with QID Frequency and an Albuterol order with Frequency of every 4 hours PRN wheezing or increased work of breathing using Per Protocol order mode. Greater than 13 - enter or revise RT Bronchodilator order(s) to one equivalent RT bronchodilator order with every 4 hours Frequency and an Albuterol order with Frequency of every 2 hours PRN wheezing or increased work of breathing using Per Protocol order mode. RT to enter RT Home Evaluation for COPD & MDI Assessment order using Per Protocol order mode.     Electronically signed by Laura Eden RCP on 3/27/2022 at 9:59 AM

## 2022-03-27 NOTE — PROGRESS NOTES
Assessment and Plan:        1. Mesh infection- s/p removal POD2- doing well. Expected pain. No BM yet  2. hbp- added norvasc    3. Dm- sugars good on diet alone; will have dietitian instruct. He was on a sulfonylurea at home. 4. carole-creatinine appears to have a new level in the 2s. Nephrology followingl      CC:  Abdominal pain  HPI:  Pt with prior hx of abdominal operations, presented with mesh infection. Also had CAROLE,hx dm,, hbp, LAURIE. Went to OR 3.25.    ROS (12 point review of systems completed. Pertinent positives noted.  Otherwise ROS is negative) : chronic back  PMH:  Per HPI  SHX:    FHX: heart, cva, dm  Allergies: See above    Medications:     sodium chloride      dextrose        bumetanide  1 mg Oral Daily    tamsulosin  0.4 mg Oral Nightly    amLODIPine  10 mg Oral Daily    famotidine  20 mg Oral BID    ceFAZolin  3,000 mg IntraVENous 30 Min Pre-Op    ceFAZolin 2000 mg in 20 mL SWFI IV Syringe  2,000 mg IntraVENous Q8H    lidocaine  1 patch TransDERmal Daily    Or    lidocaine  2 patch TransDERmal Daily    Or    lidocaine  3 patch TransDERmal Daily    heparin (porcine)  5,000 Units SubCUTAneous Q8H    ipratropium-albuterol  1 ampule Inhalation BID    sodium chloride flush  5-40 mL IntraVENous 2 times per day    [Held by provider] amitriptyline  10 mg Oral Nightly    doxepin  50 mg Oral Nightly    budesonide-formoterol  2 puff Inhalation BID    montelukast  10 mg Oral Nightly    levothyroxine  175 mcg Oral Daily    rOPINIRole  1 mg Oral Q24H    oxybutynin  10 mg Oral Daily    rOPINIRole  2 mg Oral Nightly    carvedilol  25 mg Oral BID       Vital Signs:   BP (!) 153/93   Pulse 57   Temp 97.7 °F (36.5 °C) (Oral)   Resp 18   Ht 6' 1\" (1.854 m)   Wt (!) 317 lb 10.9 oz (144.1 kg)   SpO2 96%   BMI 41.91 kg/m²      Intake/Output Summary (Last 24 hours) at 3/27/2022 0731  Last data filed at 3/27/2022 0446  Gross per 24 hour   Intake 1200 ml   Output 2775 ml   Net -1575

## 2022-03-27 NOTE — PROGRESS NOTES
Problem: Pain:  Goal: Pain level will decrease  Description: Pain level will decrease  Outcome: Ongoing   Pain Assessment: 0-10  Pain Level: 8   Patient's Stated Pain Goal: 5   Is pain goal met at this time? No   Patient is receiving Dilaudid 0.5mg q 3 hours PRN for pain. Will continue to monitor and reassess. Non-Pharmaceutical Pain Intervention(s): Rest,Repositioned   Problem: Falls - Risk of:  Goal: Will remain free from falls  Description: Will remain free from falls  Outcome: Ongoing   All fall precautions in place. Bed in low position, alarm activated and appropriate use of call light. Problem: Discharge Planning:  Goal: Discharged to appropriate level of care  Description: Discharged to appropriate level of care  Outcome: Ongoing   Discharge date is unclear, plan is to return to home with wife. Problem: Skin Integrity:  Goal: Absence of new skin breakdown  Description: Absence of new skin breakdown  Outcome: Ongoing   Skin assessment completed. Patient turned every 2 hours and as needed. No skin breakdown this shift. Incision is now open to air,staples intact. Incision is clean, no drainage noted. Patient will be encouraged to walk in halls, use IS, and take CHG bath. Care plan reviewed with patient. Patient verbalizes understanding of the plan of care and contributes to goal setting.

## 2022-03-27 NOTE — PLAN OF CARE
Problem: Impaired respiratory status  Goal: Clear lung sounds  Description: Clear lung sounds  3/27/2022 1957 by Jerri Colon RCP  Outcome: Ongoing   Breath sounds are clear and diminished at this time. Continue with treatments as ordered.

## 2022-03-27 NOTE — PLAN OF CARE
Problem: Impaired respiratory status  Goal: Clear lung sounds  Description: Clear lung sounds  Outcome: Ongoing   Continue with Duoneb and Symbicort

## 2022-03-28 ENCOUNTER — TELEPHONE (OUTPATIENT)
Dept: PULMONOLOGY | Age: 62
End: 2022-03-28

## 2022-03-28 PROCEDURE — 99233 SBSQ HOSP IP/OBS HIGH 50: CPT | Performed by: INTERNAL MEDICINE

## 2022-03-28 PROCEDURE — 6360000002 HC RX W HCPCS: Performed by: INTERNAL MEDICINE

## 2022-03-28 PROCEDURE — 99024 POSTOP FOLLOW-UP VISIT: CPT | Performed by: SURGERY

## 2022-03-28 PROCEDURE — 6370000000 HC RX 637 (ALT 250 FOR IP): Performed by: INTERNAL MEDICINE

## 2022-03-28 PROCEDURE — 1200000000 HC SEMI PRIVATE

## 2022-03-28 PROCEDURE — 6370000000 HC RX 637 (ALT 250 FOR IP): Performed by: STUDENT IN AN ORGANIZED HEALTH CARE EDUCATION/TRAINING PROGRAM

## 2022-03-28 PROCEDURE — APPSS30 APP SPLIT SHARED TIME 16-30 MINUTES: Performed by: NURSE PRACTITIONER

## 2022-03-28 PROCEDURE — 94760 N-INVAS EAR/PLS OXIMETRY 1: CPT

## 2022-03-28 PROCEDURE — 99024 POSTOP FOLLOW-UP VISIT: CPT | Performed by: NURSE PRACTITIONER

## 2022-03-28 PROCEDURE — 6370000000 HC RX 637 (ALT 250 FOR IP): Performed by: NURSE PRACTITIONER

## 2022-03-28 PROCEDURE — 94640 AIRWAY INHALATION TREATMENT: CPT

## 2022-03-28 PROCEDURE — 2580000003 HC RX 258: Performed by: STUDENT IN AN ORGANIZED HEALTH CARE EDUCATION/TRAINING PROGRAM

## 2022-03-28 PROCEDURE — 99232 SBSQ HOSP IP/OBS MODERATE 35: CPT | Performed by: INTERNAL MEDICINE

## 2022-03-28 PROCEDURE — 6360000002 HC RX W HCPCS: Performed by: SURGERY

## 2022-03-28 RX ORDER — HYDRALAZINE HYDROCHLORIDE 25 MG/1
25 TABLET, FILM COATED ORAL EVERY 8 HOURS SCHEDULED
Status: DISCONTINUED | OUTPATIENT
Start: 2022-03-28 | End: 2022-03-30 | Stop reason: HOSPADM

## 2022-03-28 RX ORDER — OXYCODONE AND ACETAMINOPHEN 10; 325 MG/1; MG/1
1 TABLET ORAL EVERY 4 HOURS PRN
Status: DISCONTINUED | OUTPATIENT
Start: 2022-03-28 | End: 2022-03-30 | Stop reason: HOSPADM

## 2022-03-28 RX ADMIN — ROPINIROLE HYDROCHLORIDE 2 MG: 1 TABLET, FILM COATED ORAL at 22:20

## 2022-03-28 RX ADMIN — IPRATROPIUM BROMIDE AND ALBUTEROL SULFATE 1 AMPULE: .5; 3 SOLUTION RESPIRATORY (INHALATION) at 18:12

## 2022-03-28 RX ADMIN — BUDESONIDE AND FORMOTEROL FUMARATE DIHYDRATE 2 PUFF: 160; 4.5 AEROSOL RESPIRATORY (INHALATION) at 18:20

## 2022-03-28 RX ADMIN — Medication 2000 MG: at 04:31

## 2022-03-28 RX ADMIN — HYDROMORPHONE HYDROCHLORIDE 0.5 MG: 1 INJECTION, SOLUTION INTRAMUSCULAR; INTRAVENOUS; SUBCUTANEOUS at 08:21

## 2022-03-28 RX ADMIN — HEPARIN SODIUM 5000 UNITS: 5000 INJECTION INTRAVENOUS; SUBCUTANEOUS at 00:31

## 2022-03-28 RX ADMIN — MONTELUKAST SODIUM 10 MG: 10 TABLET ORAL at 22:20

## 2022-03-28 RX ADMIN — LEVOTHYROXINE SODIUM 175 MCG: 0.15 TABLET ORAL at 06:56

## 2022-03-28 RX ADMIN — HYDRALAZINE HYDROCHLORIDE 25 MG: 25 TABLET, FILM COATED ORAL at 22:20

## 2022-03-28 RX ADMIN — BUDESONIDE AND FORMOTEROL FUMARATE DIHYDRATE 2 PUFF: 160; 4.5 AEROSOL RESPIRATORY (INHALATION) at 08:31

## 2022-03-28 RX ADMIN — Medication 2000 MG: at 11:31

## 2022-03-28 RX ADMIN — SODIUM CHLORIDE, PRESERVATIVE FREE 20 ML: 5 INJECTION INTRAVENOUS at 11:32

## 2022-03-28 RX ADMIN — NALOXEGOL OXALATE 12.5 MG: 12.5 TABLET, FILM COATED ORAL at 09:39

## 2022-03-28 RX ADMIN — OXYCODONE AND ACETAMINOPHEN 1 TABLET: 10; 325 TABLET ORAL at 10:42

## 2022-03-28 RX ADMIN — DOXEPIN HYDROCHLORIDE 50 MG: 50 CAPSULE ORAL at 22:19

## 2022-03-28 RX ADMIN — HEPARIN SODIUM 5000 UNITS: 5000 INJECTION INTRAVENOUS; SUBCUTANEOUS at 18:23

## 2022-03-28 RX ADMIN — CARVEDILOL 50 MG: 25 TABLET, FILM COATED ORAL at 18:22

## 2022-03-28 RX ADMIN — CARVEDILOL 50 MG: 25 TABLET, FILM COATED ORAL at 09:39

## 2022-03-28 RX ADMIN — TAMSULOSIN HYDROCHLORIDE 0.4 MG: 0.4 CAPSULE ORAL at 22:21

## 2022-03-28 RX ADMIN — AMLODIPINE BESYLATE 10 MG: 10 TABLET ORAL at 09:38

## 2022-03-28 RX ADMIN — Medication 4.5 MG: at 22:21

## 2022-03-28 RX ADMIN — HYDROMORPHONE HYDROCHLORIDE 0.5 MG: 1 INJECTION, SOLUTION INTRAMUSCULAR; INTRAVENOUS; SUBCUTANEOUS at 00:31

## 2022-03-28 RX ADMIN — SODIUM CHLORIDE, PRESERVATIVE FREE 10 ML: 5 INJECTION INTRAVENOUS at 22:22

## 2022-03-28 RX ADMIN — OXYCODONE AND ACETAMINOPHEN 1 TABLET: 10; 325 TABLET ORAL at 22:21

## 2022-03-28 RX ADMIN — BUMETANIDE 1 MG: 1 TABLET ORAL at 09:40

## 2022-03-28 RX ADMIN — SODIUM CHLORIDE, PRESERVATIVE FREE 10 ML: 5 INJECTION INTRAVENOUS at 08:21

## 2022-03-28 RX ADMIN — IPRATROPIUM BROMIDE AND ALBUTEROL SULFATE 1 AMPULE: .5; 3 SOLUTION RESPIRATORY (INHALATION) at 08:18

## 2022-03-28 RX ADMIN — FAMOTIDINE 20 MG: 20 TABLET ORAL at 22:21

## 2022-03-28 RX ADMIN — OXYCODONE AND ACETAMINOPHEN 1 TABLET: 10; 325 TABLET ORAL at 18:24

## 2022-03-28 RX ADMIN — HYDROMORPHONE HYDROCHLORIDE 0.5 MG: 1 INJECTION, SOLUTION INTRAMUSCULAR; INTRAVENOUS; SUBCUTANEOUS at 04:34

## 2022-03-28 RX ADMIN — FAMOTIDINE 20 MG: 20 TABLET ORAL at 09:39

## 2022-03-28 RX ADMIN — OXYBUTYNIN CHLORIDE 10 MG: 10 TABLET, EXTENDED RELEASE ORAL at 09:39

## 2022-03-28 RX ADMIN — ROPINIROLE HYDROCHLORIDE 1 MG: 1 TABLET, FILM COATED ORAL at 18:22

## 2022-03-28 RX ADMIN — HYDRALAZINE HYDROCHLORIDE 25 MG: 25 TABLET, FILM COATED ORAL at 18:21

## 2022-03-28 RX ADMIN — HEPARIN SODIUM 5000 UNITS: 5000 INJECTION INTRAVENOUS; SUBCUTANEOUS at 09:39

## 2022-03-28 ASSESSMENT — PAIN DESCRIPTION - PAIN TYPE
TYPE: SURGICAL PAIN
TYPE: SURGICAL PAIN;CHRONIC PAIN

## 2022-03-28 ASSESSMENT — PAIN DESCRIPTION - LOCATION
LOCATION: ABDOMEN;BACK
LOCATION: ABDOMEN

## 2022-03-28 ASSESSMENT — PAIN SCALES - GENERAL
PAINLEVEL_OUTOF10: 9
PAINLEVEL_OUTOF10: 8
PAINLEVEL_OUTOF10: 9
PAINLEVEL_OUTOF10: 7
PAINLEVEL_OUTOF10: 8
PAINLEVEL_OUTOF10: 9
PAINLEVEL_OUTOF10: 9
PAINLEVEL_OUTOF10: 7
PAINLEVEL_OUTOF10: 7
PAINLEVEL_OUTOF10: 8

## 2022-03-28 NOTE — PLAN OF CARE
Nutrition Problem #1: Increased nutrient needs  Intervention: Food and/or Nutrient Delivery: Continue Current Diet,Continue Oral Nutrition Supplement  Nutritional Goals: Pt will consume 75% or more of meals during LOS to aid in wound healing   Problem: Nutrition  Goal: Optimal nutrition therapy  3/28/2022 1424 by Nona Armijo RD, LD  Outcome: Ongoing

## 2022-03-28 NOTE — PLAN OF CARE
Problem: Impaired respiratory status  Goal: Clear lung sounds  Description: Clear lung sounds  3/28/2022 0834 by Clem Helms RCP  Outcome: Ongoing  Note: Txs to maintain open airways

## 2022-03-28 NOTE — PROGRESS NOTES
[facility]   Nephrology progress note  3/28/2022, 10:05 AM      Pt Name:    Reva Peterson  MRN:     [id number]     YOB: 1960  Admit Date:    3/16/2022 10:41 AM  Primary Care Physician:  Mateusz Hoffman DO   Room number  5I-89/154-H    Chief Complaint: Nephrology following for CAROLE/CKD    Subjective:  Patient seen and examined  Seen earlier today during rounds  On full liquids  No chest pain  No shortness of breath      Objective:  24HR INTAKE/OUTPUT:      Intake/Output Summary (Last 24 hours) at 3/28/2022 1005  Last data filed at 3/28/2022 0809  Gross per 24 hour   Intake 2110 ml   Output 3280 ml   Net -1170 ml     I/O last 3 completed shifts: In: 2110 [P.O.:2110]  Out: 3650 [Urine:3650]  I/O this shift:  In: -   Out: 580 [Urine:580]  Admission weight: (!) 328 lb (148.8 kg)  Wt Readings from Last 3 Encounters:   03/28/22 (!) 325 lb 13.4 oz (147.8 kg)   03/03/22 (!) 333 lb 6.4 oz (151.2 kg)   02/15/22 (!) 338 lb 9.6 oz (153.6 kg)     Body mass index is 42.99 kg/m². Physical examination  VITALS:     Vitals:    03/27/22 1950 03/28/22 0434 03/28/22 0810 03/28/22 0818   BP: (!) 160/86 (!) 160/93 (!) 153/82    Pulse: 70 65 67    Resp: 18 18 20    Temp: 98.3 °F (36.8 °C) 98.8 °F (37.1 °C) 97.9 °F (36.6 °C)    TempSrc: Oral Oral Oral    SpO2: 93%  95% 94%   Weight:   (!) 325 lb 13.4 oz (147.8 kg)    Height:         General Appearance: alert and cooperative with exam, appears comfortable, no distress  Mouth/Throat: Oral mucosa moist  Lungs: No use of accessory muscles, on room air  Extremities: No significant lower extremity edema, soft  Abd: soft  Extremities: Trace lower extremity edema      Lab Data  CBC:   No results for input(s): WBC, HGB, HCT, PLT in the last 72 hours.   BMP:  Recent Labs     03/26/22  0458 03/27/22  0538    142   K 4.2 3.9    106   CO2 23 24   BUN 39* 36*   CREATININE 2.2* 2.0*   GLUCOSE 111* 81   CALCIUM 8.7 8.8   MG 2.0  --      Hepatic: No results for input(s): LABALBU, AST, ALT, ALB, BILITOT, ALKPHOS in the last 72 hours. Meds:  Infusion:    sodium chloride      dextrose       Meds:    naloxegol  12.5 mg Oral QAM    polyethylene glycol  17 g Oral Daily    carvedilol  50 mg Oral BID    bumetanide  1 mg Oral Daily    tamsulosin  0.4 mg Oral Nightly    amLODIPine  10 mg Oral Daily    famotidine  20 mg Oral BID    ceFAZolin  3,000 mg IntraVENous 30 Min Pre-Op    ceFAZolin 2000 mg in 20 mL SWFI IV Syringe  2,000 mg IntraVENous Q8H    lidocaine  1 patch TransDERmal Daily    Or    lidocaine  2 patch TransDERmal Daily    Or    lidocaine  3 patch TransDERmal Daily    heparin (porcine)  5,000 Units SubCUTAneous Q8H    ipratropium-albuterol  1 ampule Inhalation BID    sodium chloride flush  5-40 mL IntraVENous 2 times per day    [Held by provider] amitriptyline  10 mg Oral Nightly    doxepin  50 mg Oral Nightly    budesonide-formoterol  2 puff Inhalation BID    montelukast  10 mg Oral Nightly    levothyroxine  175 mcg Oral Daily    rOPINIRole  1 mg Oral Q24H    oxybutynin  10 mg Oral Daily    rOPINIRole  2 mg Oral Nightly     Meds prn: oxyCODONE-acetaminophen, magnesium sulfate, HYDROmorphone **OR** HYDROmorphone, melatonin, ipratropium-albuterol, sodium chloride flush, sodium chloride, ondansetron **OR** ondansetron, acetaminophen **OR** acetaminophen, potassium chloride **OR** potassium alternative oral replacement **OR** potassium chloride, albuterol sulfate HFA, [Held by provider] cyclobenzaprine, glucagon (rDNA), dextrose, glucose, dextrose bolus (hypoglycemia)       Impression and Plan:  1. CAROLE on CKD 3A. 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   Continue with Bumex  Overall  improved renal function    2. Volume overload. Improved, continue with Bumex  3. Proteinuria secondary to diabetic nephropathy +/-secondary FSGS (post adaptive from obesity)  4. Hypokalemia. Recheck labs today  5. Abdominal wound/infected hernia mesh. Status post surgery: Lysis of adhesions, explantation of infected mesh  6. Chronic diastolic dysfunction with volume overload. continue with Bumex  7. Chronically uncontrolled diabetes mellitus  8. Essential hypertension. On Coreg and Norvasc.   Add hydralazine    D/W patient     West Malhotra MD  [facility]

## 2022-03-28 NOTE — RT PROTOCOL NOTE
RT Inhaler-Nebulizer Bronchodilator Protocol Note    There is a bronchodilator order in the chart from a provider indicating to follow the RT Bronchodilator Protocol and there is an Initiate RT Inhaler-Nebulizer Bronchodilator Protocol order as well (see protocol at bottom of note). CXR Findings:  No results found. The findings from the last RT Protocol Assessment were as follows:   History Pulmonary Disease: None or smoker <15 pack years  Respiratory Pattern: Dyspnea on exertion or RR 21-25 bpm  Breath Sounds: Slightly diminished and/or crackles  Cough: Strong, spontaneous, non-productive  Indication for Bronchodilator Therapy: Decreased or absent breath sounds,On home bronchodilators  Bronchodilator Assessment Score: 4    Aerosolized bronchodilator medication orders have been revised according to the RT Inhaler-Nebulizer Bronchodilator Protocol below. Respiratory Therapist to perform RT Therapy Protocol Assessment initially then follow the protocol. Repeat RT Therapy Protocol Assessment PRN for score 0-3 or on second treatment, BID, and PRN for scores above 3. No Indications - adjust the frequency to every 6 hours PRN wheezing or bronchospasm, if no treatments needed after 48 hours then discontinue using Per Protocol order mode. If indication present, adjust the RT bronchodilator orders based on the Bronchodilator Assessment Score as indicated below. Use Inhaler orders unless patient has one or more of the following: on home nebulizer, not able to hold breath for 10 seconds, is not alert and oriented, cannot activate and use MDI correctly, or respiratory rate 25 breaths per minute or more, then use the equivalent nebulizer order(s) with same Frequency and PRN reasons based on the score. If a patient is on this medication at home then do not decrease Frequency below that used at home.     0-3 - enter or revise RT bronchodilator order(s) to equivalent RT Bronchodilator order with Frequency of

## 2022-03-28 NOTE — PROGRESS NOTES
HYDROmorphone **OR** HYDROmorphone, melatonin, ipratropium-albuterol, sodium chloride flush, sodium chloride, ondansetron **OR** ondansetron, acetaminophen **OR** acetaminophen, potassium chloride **OR** potassium alternative oral replacement **OR** potassium chloride, albuterol sulfate HFA, [Held by provider] cyclobenzaprine, glucagon (rDNA), dextrose, glucose, dextrose bolus (hypoglycemia)    Objective:    CBC:   No results for input(s): WBC, HGB, PLT in the last 72 hours. BMP:    Recent Labs     03/26/22  0458 03/27/22  0538    142   K 4.2 3.9    106   CO2 23 24   BUN 39* 36*   CREATININE 2.2* 2.0*   GLUCOSE 111* 81     Calcium:  Recent Labs     03/27/22  0538   CALCIUM 8.8     Ionized Calcium:No results for input(s): IONCA in the last 72 hours. Magnesium:  Recent Labs     03/26/22  0458   MG 2.0     Phosphorus:  No results for input(s): PHOS in the last 72 hours. BNP:No results for input(s): BNP in the last 72 hours. Glucose:  Recent Labs     03/25/22  2348   POCGLU 151*     HgbA1C: No results for input(s): LABA1C in the last 72 hours. INR: No results for input(s): INR in the last 72 hours. Hepatic:   No results for input(s): ALKPHOS, ALT, AST, PROT, BILITOT, BILIDIR, LABALBU in the last 72 hours. Amylase and Lipase:No results for input(s): LACTA, AMYLASE in the last 72 hours. Lactic Acid: No results for input(s): LACTA in the last 72 hours. Troponin:   No results for input(s): CKTOTAL, CKMB, TROPONINT in the last 72 hours. BNP: No results for input(s): BNP in the last 72 hours. Lipids: No results for input(s): CHOL, TRIG, HDL, LDL, LDLCALC in the last 72 hours. ABGs:   Lab Results   Component Value Date    PH 7.42 03/02/2021    PCO2 41 03/02/2021    PO2 69 03/02/2021    HCO3 27 03/02/2021    O2SAT 94 03/02/2021       Radiology reports as per the Radiologist  Radiology: CT CHEST WO CONTRAST    Result Date: 3/16/2022  PROCEDURE: CT CHEST WO CONTRAST CLINICAL INFORMATION: cough.  COMPARISON: Chest x-ray obtained on the same day. CT scan dated 24th of May 2021. . TECHNIQUE: 5 mm noncontrast axial images were obtained through the chest. Sagittal and coronal reconstructions were obtained. All CT scans at this facility use dose modulation, iterative reconstruction, and/or weight-based dosing when appropriate to reduce radiation dose to as low as reasonably achievable. FINDINGS: There is borderline cardiomegaly. There is calcification in the left anterior descending and right coronary arteries. . The aorta is of normal caliber. There is a 13 mm metallic density in the left lower lobe pulmonary artery. This appears new since previous CT pulmonary angiogram dated 24th of May 2021. Mo Maxx There is small mediastinal lymph nodes present. There is no pericardial effusion. There is a small right pleural effusion There is thickening of the interstitial lung markings. There appears to be slightly increased fluid along the fissures. There is thoracic spondylosis. . Is a small hiatal hernia There is splenomegaly. 1. Borderline cardiomegaly. Calcification in the left anterior descending and right coronary arteries. 2. 13 mm metallic density in the left lower lobe pulmonary artery, new since previous CT scan of the chest dated 24th of May 2021. 3. Small right pleural effusion. 4. Small mediastinal lymph nodes. 5. Thickening off the interstitial lung markings. 6. Thoracic spondylosis. 7. Splenomegaly. 8. Small hiatal hernia. **This report has been created using voice recognition software. It may contain minor errors which are inherent in voice recognition technology. ** Final report electronically signed by DR Allie Mora on 3/16/2022 12:47 PM    NM LUNG VENT/PERFUSION (VQ)    Result Date: 3/16/2022  NM Lung Perfusion/ventilation Comparison: None Findings 3.10 mCi technetium 99m MAA used. 6.80 MCI xenon-133 gas used. Wash-in equilibrium and washout images show normal xenon-133 ventilation scan.  The lungs are almost completely clear of xenon within 2 minutes. Perfusion lung images show no defects. Low probability of pulmonary embolus. Normal ventilation bilaterally with good washout and no areas of trapping. This document has been electronically signed by: Lalo Adams MD on 03/16/2022 05:52 PM    XR CHEST PORTABLE    Result Date: 3/16/2022  PROCEDURE: XR CHEST PORTABLE CLINICAL INFORMATION: fever. COMPARISON: Chest x-ray dated third of March 2022. TECHNIQUE: AP upright view of the chest. FINDINGS: There is mild cardiomegaly. .The mediastinum is not widened. There are no pulmonary infiltrates or effusions. The pulmonary vascularity is increased. There is thoracic spondylosis. 1. Mild cardiomegaly and increased pulmonary vascularity. 2. Otherwise negative chest x-ray. . **This report has been created using voice recognition software. It may contain minor errors which are inherent in voice recognition technology. ** Final report electronically signed by DR Ajit Benedict on 3/16/2022 11:53 AM       Physical Exam:  Vitals: BP (!) 153/82   Pulse 67   Temp 97.9 °F (36.6 °C) (Oral)   Resp 20   Ht 6' 1\" (1.854 m)   Wt (!) 325 lb 13.4 oz (147.8 kg)   SpO2 94%   BMI 42.99 kg/m²   24 hour intake/output:    Intake/Output Summary (Last 24 hours) at 3/28/2022 0933  Last data filed at 3/28/2022 0809  Gross per 24 hour   Intake 2110 ml   Output 3280 ml   Net -1170 ml     Last 3 weights: Wt Readings from Last 3 Encounters:   03/28/22 (!) 325 lb 13.4 oz (147.8 kg)   03/03/22 (!) 333 lb 6.4 oz (151.2 kg)   02/15/22 (!) 338 lb 9.6 oz (153.6 kg)       General appearance - oriented to person, place, and time and overweight  HEENT: Normocephalic and Atraumatic  Chest - clear to auscultation, no wheezes, rales or rhonchi, symmetric air entry  Cardiovascular - normal rate and regular rhythm  Abdomen - BS active.  Soft, round  Surgical Site: incision is well approximated, staples intact, no drainage no erythema   Neurological - Alert and oriented and Normal speech  Integumentary - Skin color, texture, turgor normal. No Rashes or lesions  Musculoskeletal -Full ROM times 4 extremities      DVT prophylaxis: [] Lovenox                                 [] SCDs                                 [x] SQ Heparin                                 [] Encourage ambulation           [] Already on Anticoagulation           X Aspirin     Assessment:  1. S/p removal of portion of infected mesh, lysis of significant adhesions POD# 5  2. Postoperative pain as expected   3. CAROLE on CKD  4. CHF- HTN   5. COPD  6. DM  7. LAURIE  8. Chronic back pain - percocet   9. 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 *      Plan:    1. Full liquid diet advance to soft   2. Analgesia and antiemetics as needed adjusted home percocet   3. Antibiotic therapy  Ancef   4. Monitor Labs, lytes per protocol  5. medicine management   6. Nephrology and cardiology following   7. Sub q heparin prophylaxis   8. GI prophylaxis   9. Discharge planning tomorrow if doing well       Electronically signed by SAHIL Davis CNP on 3/28/2022 at 9:33 AM Patient seen and examined independently by me. Above discussed and I agree with CNP. Labs, cultures, and radiographs where available were reviewed. See orders for the updated patient care plan.     Beryl Kraft MD MD, pt doing well abd soft no signs infection  Inc diet possible d/c in AM  3/28/2022   2:24 PM

## 2022-03-28 NOTE — PROGRESS NOTES
Comprehensive Nutrition Assessment    Type and Reason for Visit:  Reassess,Consult,Patient Education (ADA/ low sodium diet)    Nutrition Recommendations/Plan:   Continue current diet and ONS (Will BID) as ordered  Recommend MVI    Nutrition Assessment:    Pt. Improving from nutritional standpoint AEB report good intake and tolerance of oral diet. At risk for further nutrition compromise r/t increased nutrient needs for wound healing, admit with sepsis 2/2 abdominal wound and underlying medical condition (Hx: HTN, DM, CHF, CAROLE on CKD, Bladder Disease, Iron Deficiency Anemia). Malnutrition Assessment:  Malnutrition Status:  No malnutrition    Context:  Acute Illness     Findings of the 6 clinical characteristics of malnutrition:  Energy Intake:  No significant decrease in energy intake  Weight Loss:  Unable to assess (difficult to assess d/t CHF/fluids shifts noted)     Body Fat Loss:  No significant body fat loss     Muscle Mass Loss:  No significant muscle mass loss    Fluid Accumulation:  Unable to assess (difficult to assess d/t CHF/fluids shifts noted) Extremities   Strength:  Not Performed    Estimated Daily Nutrient Needs:  Energy (kcal):  1057-5826 kcal/day (10-12 kcal/kg); Weight Used for Energy Requirements:   (144 kg)     Protein (g):   g/day (monitoring renal status);  Weight Used for Protein Requirements:   (IBW 83.6 kg)             Nutrition Related Findings:       Pt. Report/Treatments/Miscellaneous: reports tolerating po well and 100% intake of diet advancement to solid food today, reports acceptance of Will, reports drinks 2 glucerna shakes (29 grams protein/ each) at home as meal replacement but will check for lower protein options due to CAROLE  GI Status: denies nausea or abdominal pain, reports bowel have awoke, BM x 1 today  Pertinent Labs: Glucose 81, BUN 36, Creatinine 2, Potassium 3.9, (5/24/21) A1C 9.3%  Pertinent Meds: bumex, movantik, glycolax    Wounds:  Surgical Incision (3/25/22: abdomen incision- remove portion of infected mesh and lysis of significant adhesions)       Current Nutrition Therapies:    ADULT ORAL NUTRITION SUPPLEMENT; Breakfast, Dinner; Wound Healing Oral Supplement  ADULT DIET; Regular; 4 carb choices (60 gm/meal); Low Sodium (2 gm)    Anthropometric Measures:  · Height: 6' 1\" (185.4 cm)  · Current Body Weight: 325 lb 13.4 oz (147.8 kg) (3/28; +1 pitting BLE edema)   · Admission Body Weight: 334 lb (151.5 kg) (3/17; +2 pitting edema BLE)    · Usual Body Weight:  (Per EMR: 338 lb 9.6 oz on 2/15/22; 350 lb 3.2 oz on 9/9/21; 355 lb 12.8 ozon 5/3/21)     · Ideal Body Weight: 184 lbs;  · BMI: 43  · BMI Categories: Obese Class 3 (BMI 40.0 or greater)       Nutrition Diagnosis:   · Increased nutrient needs related to increase demand for energy/nutrients as evidenced by wounds      Nutrition Interventions:   Food and/or Nutrient Delivery:  Continue Current Diet,Continue Oral Nutrition Supplement  Nutrition Education/Counseling:  Education initiated (educated on low sodium diet and fluid restriction for CHF, carbohydrate controlled diet guidelines, encouraged 60 grams CHO/ meal, information sheets provided)   Coordination of Nutrition Care:  Continue to monitor while inpatient    Goals:  Pt will consume 75% or more of meals during LOS to aid in wound healing       Nutrition Monitoring and Evaluation:   Behavioral-Environmental Outcomes:  None Identified   Food/Nutrient Intake Outcomes:  Diet Advancement/Tolerance,Food and Nutrient Intake,Supplement Intake,Vitamin/Mineral Intake  Physical Signs/Symptoms Outcomes:  Biochemical Data,Weight,Skin,Nutrition Focused Physical Findings,Fluid Status or Edema     Discharge Planning:     Too soon to determine     Electronically signed by Jeremi Kuhn RD, LD on 3/28/22 at 2:24 PM EDT    Contact: [phone]

## 2022-03-28 NOTE — PROGRESS NOTES
Hospitalist Progress Note    Patient: Isis Lim  Unit/Bed: 1X-85/047-I  YOB: 1960  MRN: [id number]  Acct: [de-identified]    PCP: Gustabo Sagastume DO    Date of Admission: 3/16/2022    Assessment/Plan:    POD#3 s/p explantation of abdominal mesh - Removal of infected abdominal mesh and lysis of multiple bowel loop adhesions. Advanced to full diet and tolerating well  - Continue incision care; no erythema or drainage noted  - Discontinue Cefazolin. Patient has received >7 days pf Abx therapy  - CBC, BMP, Mg and Phos in morning. Sepsis secondary to abdominal wound - Resolving. Present on arrival. SIRS 2/4 (Tachycardia and tachypnea). Abdominal wound is from dehiscence of hernia repair in 2018 with surgical mesh exposure, now with purulent discharge. Patient received 1 dose Ceftriaxone within the ED and 30cc/kg fluid bolus resuscitation. CT abdomen without abscess. - MRSA nares negative, Blood Cx NGTD, Wound Cx MSSA and corynebacterium species  - Labs as above    Stage 1 CAROLE on CKD 3a with renal progression to 3b - Stable. Renal progression appears to have occurred around 2/2022 and likely medication related with adjustments to home Bumex regimen. This appears to be his new baseline. Mild worsening of CAROLE during admission is to be expected with use of diuretic.  - Avoid nephrotoxic agents and renally dose medications  - Daily standing weights, strict I/O  - Daily BMP  - Nephrology following    Elevated microalbumin/Creatinine ratio - 1060mg/g. Likely secondary to hypertension and diabetes. - Nephrology following    Chronic systolic heart failure NYHA II - Without exacerbation. EF=55-60% on ECHO 5/25/21. Implanted CardioMEMS 6/16/2021. [address] 12/1/2020 demonstrating elevated LVEDP. Patient follows with CHF clinic.  CardioMEMS download completed 3/22/22 with only mild elevation from baseline.  - Patient not currently taking Entresto, spironolactone, carvedilol or Bumex per chart review and recommendations of outside provider per patient  - Bumex 1mg daily  - Cardiology following     COPD GOLD 2 - Without exacerbation. PFT 3/15/2021 FEV1=75% of predicted. - Continue home Montelukast, Symbicort  - Duonebs twice daily  - Continue Acapella and IS     NIDDM2 - Uncontrolled. A1c=9.9 (5/24/21). Dietician/nutrition has counseled that patient.  - high dose ssi, POCT glucose 4x daily AC/HS, Hypoglycemia protocol  - Inpatient BG goal 140-180; tight glucose control will be imperative in the laci and post-operative state for adequate healing.     Primary HTN - Uncontrolled. Likely secondary to pain. - Continue [id number] daily, Coreg 50mg twice dialy and Hydralazine 25mg three times daily. Iron deficiency anemia - Iron 40, Ferritin 423, Iron sat 20%. - Following with nephrology; EPO at their discretion    Elevated troponin - Secondary to type II demand ischemia. Patient denies chest pain, EKG without ST or T-wave abnormalities. - No indication to trend    LAURIE - CPAP at home. No complications  - Continue home CPAP at night.     BPH - Noted  - Continue Tamsulosin     Hx of hypothyroidism - Noted  - Continue Synthroid     Hx of chronic back pain - Noted  - Continue home percocet  - Hold Flexeril    High anion gap metabolic acidosis - Resolved. Secondary to sepsis and elevated lactic acid. ?Pulmonary embolism - Resolved. Pulmonary embolism was considered within the ED, but CTA not obtainable due to worsening renal function. V/Q scan demonstrated low probability of pulmonary embolism. Moderate Hypokalemia - Resolved. Secondary to diuretic use. Patient also received 80mg Lasix within the ED. Magnesium and phosphorus within normal limits  - Continue potassium replacement as needed  - Continuous telemetry    Acute hypoxic respiratory failure - Resolved. Weaned to room air. Likely secondary to fluid overload.  Negative viral respiratory panel 3/19/22.  - Continue Duoneb twice daily      Expected discharge date:  3/29/22    Disposition:    [x] Home       [] TCU       [] Rehab       [] Psych       [] SNF       [] [place]       [] Other -    Chief Complaint: Fever    Hospital Course:  Isis Lim is a 63 y.o. male with PMHx of HTN, CHF, BPH, LAURIE, hypothyroidism, chronic back pain, CKD, and NIDDM2 who presented to [facility] with complaint of fever that began approximately 5 days prior to presentation that was self-reported as high as 101F. The patient had associated symptoms of diaphoresis. He noted that he also has increased cough and shortness of breath from his baseline during these complaints. He has been taking Ibuprofen for his fever and OTC cough medicine for his cough which he states helps. The patient reported that his shortness of breath as well as his cough have been occurring for the last 4 months and have not worsened over that time period. He also stated that he is recently been instructed to hold his Bumex as well as his Entresto from his nephrologist and CHF clinic respectively. He denies headache, lightheadedness, change in vision, rhinorrhea, congestion, sore throat, cough, hemoptysis, chest pain, palpitations, abdominal pain, nausea, vomiting, hematemesis, change in bowel/bladder habits, hematochezia, hematuria, weakness, difficulty with ambulation, numbness/tingling, or known exposure to sick contacts. He admits to fever, chills, AKINS, PND, and shortness of breath. Within the emergency department the patient was administered Lasix 80mg, ASA 324mg, 1 dose of Ceftriaxone 1g, and 4mg morphine, He also underwent CT of the chest which demonstrated borderline cardiomegaly, calcification of the LAD and RCA coronary arteries, small right pleural effusion and splenomegaly. The patient was admitted to the hospital service for further care and management. Orders were placed for general surgery consult. Subjective (past 24 hours): Per nursing, no acute events overnight.  On exam the patient was sitting in chair and doing well. He admits to some post-operative pain which is to be expected but that is well controlled with pain medication. He was advanced to soft diet from full clears and sees to be tolerating well. He has no complaints and stated he actually feels well. He had some post-operative nausea that has resolved and he stated he has had bowel movements and has been walking the halls. Review of Systems: 12 point review of systems completed and pertinent positives are noted in the HPI. All other systems reviewed and negative.     Medications:  Reviewed    Infusion Medications    sodium chloride      dextrose       Scheduled Medications    naloxegol  12.5 mg Oral QAM    polyethylene glycol  17 g Oral Daily    carvedilol  50 mg Oral BID    bumetanide  1 mg Oral Daily    tamsulosin  0.4 mg Oral Nightly    amLODIPine  10 mg Oral Daily    famotidine  20 mg Oral BID    ceFAZolin  3,000 mg IntraVENous 30 Min Pre-Op    ceFAZolin 2000 mg in 20 mL SWFI IV Syringe  2,000 mg IntraVENous Q8H    lidocaine  1 patch TransDERmal Daily    Or    lidocaine  2 patch TransDERmal Daily    Or    lidocaine  3 patch TransDERmal Daily    heparin (porcine)  5,000 Units SubCUTAneous Q8H    ipratropium-albuterol  1 ampule Inhalation BID    sodium chloride flush  5-40 mL IntraVENous 2 times per day    [Held by provider] amitriptyline  10 mg Oral Nightly    doxepin  50 mg Oral Nightly    budesonide-formoterol  2 puff Inhalation BID    montelukast  10 mg Oral Nightly    levothyroxine  175 mcg Oral Daily    rOPINIRole  1 mg Oral Q24H    oxybutynin  10 mg Oral Daily    rOPINIRole  2 mg Oral Nightly     PRN Meds: magnesium sulfate, oxyCODONE-acetaminophen, HYDROmorphone **OR** HYDROmorphone, melatonin, ipratropium-albuterol, sodium chloride flush, sodium chloride, ondansetron **OR** ondansetron, acetaminophen **OR** acetaminophen, potassium chloride **OR** potassium alternative oral replacement **OR** potassium chloride, albuterol sulfate HFA, [Held by provider] cyclobenzaprine, glucagon (rDNA), dextrose, glucose, dextrose bolus (hypoglycemia)      Intake/Output Summary (Last 24 hours) at 3/28/2022 1286  Last data filed at 3/28/2022 0809  Gross per 24 hour   Intake 2110 ml   Output 3280 ml   Net -1170 ml       Diet:  ADULT ORAL NUTRITION SUPPLEMENT; Breakfast, Dinner; Wound Healing Oral Supplement  ADULT DIET; Full Liquid    Exam:  BP (!) 153/82   Pulse 67   Temp 97.9 °F (36.6 °C) (Oral)   Resp 20   Ht 6' 1\" (1.854 m)   Wt (!) 325 lb 13.4 oz (147.8 kg)   SpO2 94%   BMI 42.99 kg/m²     Physical Exam  Vitals and nursing note reviewed. Constitutional:       General: He is awake. Appearance: Normal appearance. He is morbidly obese. Interventions: Nasal cannula in place. HENT:      Head: Normocephalic and atraumatic. Right Ear: External ear normal.      Left Ear: External ear normal.      Nose: Nose normal.      Mouth/Throat:      Mouth: Mucous membranes are moist.      Pharynx: Oropharynx is clear. Eyes:      Conjunctiva/sclera: Conjunctivae normal.      Pupils: Pupils are equal, round, and reactive to light. Cardiovascular:      Rate and Rhythm: Normal rate and regular rhythm. Pulses: Normal pulses. Dorsalis pedis pulses are 2+ on the right side and 2+ on the left side. Posterior tibial pulses are 2+ on the right side and 2+ on the left side. Heart sounds: Normal heart sounds. Pulmonary:      Effort: Pulmonary effort is normal.      Breath sounds: Normal breath sounds. Decreased air movement present. No wheezing. Abdominal:      General: A surgical scar is present. Bowel sounds are normal.      Palpations: Abdomen is soft. Tenderness: There is no abdominal tenderness. Musculoskeletal:         General: Normal range of motion. Cervical back: Normal range of motion and neck supple.       Right lower le+ Pitting Edema present. Left lower le+ Pitting Edema present. Skin:     General: Skin is warm and dry. Capillary Refill: Capillary refill takes less than 2 seconds. Neurological:      General: No focal deficit present. Mental Status: He is alert and oriented to person, place, and time. Mental status is at baseline. GCS: GCS eye subscore is 4. GCS verbal subscore is 5. GCS motor subscore is 6. Psychiatric:         Attention and Perception: Attention and perception normal.         Mood and Affect: Mood and affect normal.         Speech: Speech normal.         Behavior: Behavior normal. Behavior is cooperative. Thought Content: Thought content normal.         Cognition and Memory: Cognition and memory normal.         Judgment: Judgment normal.         Labs:  No results for input(s): WBC, HGB, HCT, PLT in the last 72 hours. Recent Labs     22  0458 22  0538    142   K 4.2 3.9    106   CO2 23 24   BUN 39* 36*   CREATININE 2.2* 2.0*   CALCIUM 8.7 8.8     No results for input(s): AST, ALT, BILIDIR, BILITOT, ALKPHOS in the last 72 hours. No results for input(s): INR in the last 72 hours. No results for input(s): [employer] in the last 72 hours. Microbiology:    Urinalysis:   Lab Results   Component Value Date    NITRU NEGATIVE 2022    WBCUA 5-9 2022    BACTERIA NONE SEEN 2022    RBCUA 25-50 2022    BLOODU MODERATE 2022    SPECGRAV 1.015 2022    GLUCOSEU 500 2021       Radiology:  CT ABDOMEN PELVIS WO CONTRAST Additional Contrast? None   Final Result   Gas and fluid external to the abdomen and near the umbilicus along the patient's hernia mesh level. No evidence of intra-abdominal abscess. No acute abdominal or pelvic abnormalities. Multiple chronic findings. Stable appearance of the lung bases. **This report has been created using voice recognition software.   It may contain minor errors which are inherent in voice recognition technology. **      Final report electronically signed by Dr. Eleanora Apgar on 3/19/2022 6:32 PM      XR CHEST PORTABLE   Final Result   Impression:   Cardiomegaly, no CHF. Small right pleural effusion. This document has been electronically signed by: Skye Jensen MD on    2022 07:27 PM      US RENAL COMPLETE   Final Result       1. Bilateral renal cysts. 2. No definite evidence of hydronephrosis on either side. 3. The bladder was not visualized. .               **This report has been created using voice recognition software. It may contain minor errors which are inherent in voice recognition technology. **      Final report electronically signed by DR Teresa Brewer on 3/18/2022 11:30 AM      NM LUNG VENT/PERFUSION (VQ)   Final Result   Low probability of pulmonary embolus. Normal ventilation bilaterally with good washout and no areas of trapping. This document has been electronically signed by: Audrey Dorantes MD on    2022 05:52 PM      CT CHEST WO CONTRAST   Final Result       1. Borderline cardiomegaly. Calcification in the left anterior descending and right coronary arteries. 2. 13 mm metallic density in the left lower lobe pulmonary artery, new since previous CT scan of the chest dated 24th of May 2021. 3. Small right pleural effusion. 4. Small mediastinal lymph nodes. 5. Thickening off the interstitial lung markings. 6. Thoracic spondylosis. 7. Splenomegaly. 8. Small hiatal hernia. **This report has been created using voice recognition software. It may contain minor errors which are inherent in voice recognition technology. **      Final report electronically signed by DR Teresa Brewer on 3/16/2022 12:47 PM      XR CHEST PORTABLE   Final Result   1. Mild cardiomegaly and increased pulmonary vascularity. 2. Otherwise negative chest x-ray. .               **This report has been created using voice recognition software.  It may contain minor errors which are inherent in voice recognition technology. **      Final report electronically signed by DR Rebecca Manjarrez on 3/16/2022 11:53 AM          DVT prophylaxis:  [] Lovenox  [] SCDs  [x] SQ Heparin  [] Encourage ambulation  [] Already on anticoagulation  [] Other -      Code Status: Full Code    PT/OT Evaluation Status: N/A    Telemetry:  [x] Yes  [] No    Electronically signed by Tony Lowery DO, MBA on 3/28/2022 at 8:37 AM

## 2022-03-28 NOTE — PLAN OF CARE
Problem: Falls - Risk of:  Goal: Will remain free from falls  Description: Will remain free from falls  Outcome: Met This Shift     Problem: Skin Integrity:  Goal: Absence of new skin breakdown  Description: Absence of new skin breakdown  Outcome: Met This Shift     Problem: Pain:  Goal: Pain level will decrease  Description: Pain level will decrease  Outcome: Ongoing     Problem: Impaired respiratory status  Goal: Clear lung sounds  Description: Clear lung sounds  3/27/2022 1957 by Taqueria Bowen RCP  Outcome: Ongoing     Problem: Discharge Planning:  Goal: Discharged to appropriate level of care  Description: Discharged to appropriate level of care  Outcome: Ongoing     Problem: Nutrition  Goal: Optimal nutrition therapy  Outcome: Ongoing

## 2022-03-28 NOTE — RT PROTOCOL NOTE
every 4 hours PRN for wheezing or increased work of breathing using Per Protocol order mode. 4-6 - enter or revise RT Bronchodilator order(s) to two equivalent RT bronchodilator orders with one order with BID Frequency and one order with Frequency of every 4 hours PRN wheezing or increased work of breathing using Per Protocol order mode. 7-10 - enter or revise RT Bronchodilator order(s) to two equivalent RT bronchodilator orders with one order with TID Frequency and one order with Frequency of every 4 hours PRN wheezing or increased work of breathing using Per Protocol order mode. 11-13 - enter or revise RT Bronchodilator order(s) to one equivalent RT bronchodilator order with QID Frequency and an Albuterol order with Frequency of every 4 hours PRN wheezing or increased work of breathing using Per Protocol order mode. Greater than 13 - enter or revise RT Bronchodilator order(s) to one equivalent RT bronchodilator order with every 4 hours Frequency and an Albuterol order with Frequency of every 2 hours PRN wheezing or increased work of breathing using Per Protocol order mode. RT to enter RT Home Evaluation for COPD & MDI Assessment order using Per Protocol order mode.     Electronically signed by Rahul Whelan RCP on 3/28/2022 at 8:22 AM

## 2022-03-28 NOTE — PLAN OF CARE
Problem: Impaired respiratory status  Goal: Clear lung sounds  Description: Clear lung sounds  3/28/2022 1816 by Carolina Head RCP  Outcome: Ongoing  Note: Txs to help improve lung aeration.

## 2022-03-28 NOTE — TELEPHONE ENCOUNTER
Tried to reach patient there was no voice mail regarding a no show appointment today , sent him a no show letter.

## 2022-03-29 LAB
ANION GAP SERPL CALCULATED.3IONS-SCNC: 15 MEQ/L (ref 8–16)
BUN BLDV-MCNC: 36 MG/DL (ref 7–22)
CALCIUM SERPL-MCNC: 8.7 MG/DL (ref 8.5–10.5)
CHLORIDE BLD-SCNC: 105 MEQ/L (ref 98–111)
CO2: 22 MEQ/L (ref 23–33)
CREAT SERPL-MCNC: 2.8 MG/DL (ref 0.4–1.2)
ERYTHROCYTE [DISTWIDTH] IN BLOOD BY AUTOMATED COUNT: 18.6 % (ref 11.5–14.5)
ERYTHROCYTE [DISTWIDTH] IN BLOOD BY AUTOMATED COUNT: 59.4 FL (ref 35–45)
GFR SERPL CREATININE-BSD FRML MDRD: 23 ML/MIN/1.73M2
GLUCOSE BLD-MCNC: 100 MG/DL (ref 70–108)
HCT VFR BLD CALC: 29.2 % (ref 42–52)
HEMOGLOBIN: 8.6 GM/DL (ref 14–18)
MAGNESIUM: 1.9 MG/DL (ref 1.6–2.4)
MCH RBC QN AUTO: 26.5 PG (ref 26–33)
MCHC RBC AUTO-ENTMCNC: 29.5 GM/DL (ref 32.2–35.5)
MCV RBC AUTO: 89.8 FL (ref 80–94)
PHOSPHORUS: 4.3 MG/DL (ref 2.4–4.7)
PLATELET # BLD: 248 THOU/MM3 (ref 130–400)
PMV BLD AUTO: 8.5 FL (ref 9.4–12.4)
POTASSIUM SERPL-SCNC: 3.5 MEQ/L (ref 3.5–5.2)
RBC # BLD: 3.25 MILL/MM3 (ref 4.7–6.1)
SODIUM BLD-SCNC: 142 MEQ/L (ref 135–145)
WBC # BLD: 5.3 THOU/MM3 (ref 4.8–10.8)

## 2022-03-29 PROCEDURE — 80048 BASIC METABOLIC PNL TOTAL CA: CPT

## 2022-03-29 PROCEDURE — 94640 AIRWAY INHALATION TREATMENT: CPT

## 2022-03-29 PROCEDURE — APPSS30 APP SPLIT SHARED TIME 16-30 MINUTES: Performed by: NURSE PRACTITIONER

## 2022-03-29 PROCEDURE — 6370000000 HC RX 637 (ALT 250 FOR IP): Performed by: NURSE PRACTITIONER

## 2022-03-29 PROCEDURE — 6370000000 HC RX 637 (ALT 250 FOR IP): Performed by: INTERNAL MEDICINE

## 2022-03-29 PROCEDURE — 6370000000 HC RX 637 (ALT 250 FOR IP): Performed by: STUDENT IN AN ORGANIZED HEALTH CARE EDUCATION/TRAINING PROGRAM

## 2022-03-29 PROCEDURE — 99024 POSTOP FOLLOW-UP VISIT: CPT | Performed by: NURSE PRACTITIONER

## 2022-03-29 PROCEDURE — 1200000000 HC SEMI PRIVATE

## 2022-03-29 PROCEDURE — 84100 ASSAY OF PHOSPHORUS: CPT

## 2022-03-29 PROCEDURE — 2580000003 HC RX 258: Performed by: INTERNAL MEDICINE

## 2022-03-29 PROCEDURE — 6360000002 HC RX W HCPCS: Performed by: INTERNAL MEDICINE

## 2022-03-29 PROCEDURE — 2580000003 HC RX 258: Performed by: STUDENT IN AN ORGANIZED HEALTH CARE EDUCATION/TRAINING PROGRAM

## 2022-03-29 PROCEDURE — 36415 COLL VENOUS BLD VENIPUNCTURE: CPT

## 2022-03-29 PROCEDURE — 99232 SBSQ HOSP IP/OBS MODERATE 35: CPT | Performed by: INTERNAL MEDICINE

## 2022-03-29 PROCEDURE — 99024 POSTOP FOLLOW-UP VISIT: CPT | Performed by: SURGERY

## 2022-03-29 PROCEDURE — 85027 COMPLETE CBC AUTOMATED: CPT

## 2022-03-29 PROCEDURE — 94760 N-INVAS EAR/PLS OXIMETRY 1: CPT

## 2022-03-29 PROCEDURE — 83735 ASSAY OF MAGNESIUM: CPT

## 2022-03-29 PROCEDURE — 99233 SBSQ HOSP IP/OBS HIGH 50: CPT | Performed by: INTERNAL MEDICINE

## 2022-03-29 RX ORDER — SODIUM CHLORIDE 9 MG/ML
INJECTION, SOLUTION INTRAVENOUS CONTINUOUS
Status: DISCONTINUED | OUTPATIENT
Start: 2022-03-29 | End: 2022-03-30 | Stop reason: HOSPADM

## 2022-03-29 RX ORDER — DOXYCYCLINE HYCLATE 100 MG
100 TABLET ORAL EVERY 12 HOURS SCHEDULED
Status: DISCONTINUED | OUTPATIENT
Start: 2022-03-29 | End: 2022-03-30 | Stop reason: HOSPADM

## 2022-03-29 RX ADMIN — ROPINIROLE HYDROCHLORIDE 2 MG: 1 TABLET, FILM COATED ORAL at 19:49

## 2022-03-29 RX ADMIN — HEPARIN SODIUM 5000 UNITS: 5000 INJECTION INTRAVENOUS; SUBCUTANEOUS at 23:52

## 2022-03-29 RX ADMIN — HYDRALAZINE HYDROCHLORIDE 25 MG: 25 TABLET, FILM COATED ORAL at 06:01

## 2022-03-29 RX ADMIN — BUDESONIDE AND FORMOTEROL FUMARATE DIHYDRATE 2 PUFF: 160; 4.5 AEROSOL RESPIRATORY (INHALATION) at 18:10

## 2022-03-29 RX ADMIN — OXYCODONE AND ACETAMINOPHEN 1 TABLET: 10; 325 TABLET ORAL at 06:02

## 2022-03-29 RX ADMIN — TAMSULOSIN HYDROCHLORIDE 0.4 MG: 0.4 CAPSULE ORAL at 19:49

## 2022-03-29 RX ADMIN — AMLODIPINE BESYLATE 10 MG: 10 TABLET ORAL at 08:15

## 2022-03-29 RX ADMIN — HEPARIN SODIUM 5000 UNITS: 5000 INJECTION INTRAVENOUS; SUBCUTANEOUS at 02:08

## 2022-03-29 RX ADMIN — OXYCODONE AND ACETAMINOPHEN 1 TABLET: 10; 325 TABLET ORAL at 16:41

## 2022-03-29 RX ADMIN — OXYBUTYNIN CHLORIDE 10 MG: 10 TABLET, EXTENDED RELEASE ORAL at 08:15

## 2022-03-29 RX ADMIN — CARVEDILOL 50 MG: 25 TABLET, FILM COATED ORAL at 16:50

## 2022-03-29 RX ADMIN — OXYCODONE AND ACETAMINOPHEN 1 TABLET: 10; 325 TABLET ORAL at 22:24

## 2022-03-29 RX ADMIN — ROPINIROLE HYDROCHLORIDE 1 MG: 1 TABLET, FILM COATED ORAL at 16:32

## 2022-03-29 RX ADMIN — IPRATROPIUM BROMIDE AND ALBUTEROL SULFATE 1 AMPULE: .5; 3 SOLUTION RESPIRATORY (INHALATION) at 07:45

## 2022-03-29 RX ADMIN — FAMOTIDINE 20 MG: 20 TABLET ORAL at 19:53

## 2022-03-29 RX ADMIN — IPRATROPIUM BROMIDE AND ALBUTEROL SULFATE 1 AMPULE: .5; 3 SOLUTION RESPIRATORY (INHALATION) at 18:00

## 2022-03-29 RX ADMIN — DOXEPIN HYDROCHLORIDE 50 MG: 50 CAPSULE ORAL at 19:49

## 2022-03-29 RX ADMIN — CARVEDILOL 50 MG: 25 TABLET, FILM COATED ORAL at 08:15

## 2022-03-29 RX ADMIN — FAMOTIDINE 20 MG: 20 TABLET ORAL at 08:15

## 2022-03-29 RX ADMIN — HEPARIN SODIUM 5000 UNITS: 5000 INJECTION INTRAVENOUS; SUBCUTANEOUS at 08:16

## 2022-03-29 RX ADMIN — BUDESONIDE AND FORMOTEROL FUMARATE DIHYDRATE 2 PUFF: 160; 4.5 AEROSOL RESPIRATORY (INHALATION) at 07:53

## 2022-03-29 RX ADMIN — NALOXEGOL OXALATE 12.5 MG: 12.5 TABLET, FILM COATED ORAL at 08:15

## 2022-03-29 RX ADMIN — SODIUM CHLORIDE: 9 INJECTION, SOLUTION INTRAVENOUS at 14:33

## 2022-03-29 RX ADMIN — HEPARIN SODIUM 5000 UNITS: 5000 INJECTION INTRAVENOUS; SUBCUTANEOUS at 16:50

## 2022-03-29 RX ADMIN — HYDROMORPHONE HYDROCHLORIDE 0.25 MG: 1 INJECTION, SOLUTION INTRAMUSCULAR; INTRAVENOUS; SUBCUTANEOUS at 19:49

## 2022-03-29 RX ADMIN — Medication 4.5 MG: at 22:24

## 2022-03-29 RX ADMIN — LEVOTHYROXINE SODIUM 175 MCG: 0.15 TABLET ORAL at 06:01

## 2022-03-29 RX ADMIN — DOXYCYCLINE HYCLATE 100 MG: 100 TABLET, COATED ORAL at 19:49

## 2022-03-29 RX ADMIN — MONTELUKAST SODIUM 10 MG: 10 TABLET ORAL at 19:49

## 2022-03-29 RX ADMIN — SODIUM CHLORIDE, PRESERVATIVE FREE 10 ML: 5 INJECTION INTRAVENOUS at 19:49

## 2022-03-29 RX ADMIN — HYDRALAZINE HYDROCHLORIDE 25 MG: 25 TABLET, FILM COATED ORAL at 22:24

## 2022-03-29 RX ADMIN — HYDRALAZINE HYDROCHLORIDE 25 MG: 25 TABLET, FILM COATED ORAL at 16:32

## 2022-03-29 RX ADMIN — OXYCODONE AND ACETAMINOPHEN 1 TABLET: 10; 325 TABLET ORAL at 10:25

## 2022-03-29 ASSESSMENT — PAIN SCALES - GENERAL
PAINLEVEL_OUTOF10: 8
PAINLEVEL_OUTOF10: 9
PAINLEVEL_OUTOF10: 8
PAINLEVEL_OUTOF10: 6
PAINLEVEL_OUTOF10: 9
PAINLEVEL_OUTOF10: 8
PAINLEVEL_OUTOF10: 8

## 2022-03-29 ASSESSMENT — PAIN DESCRIPTION - DESCRIPTORS
DESCRIPTORS: CONSTANT;DISCOMFORT
DESCRIPTORS: CONSTANT

## 2022-03-29 ASSESSMENT — PAIN DESCRIPTION - LOCATION
LOCATION: ABDOMEN
LOCATION: ABDOMEN

## 2022-03-29 ASSESSMENT — PAIN DESCRIPTION - PAIN TYPE
TYPE: SURGICAL PAIN
TYPE: SURGICAL PAIN

## 2022-03-29 ASSESSMENT — PAIN DESCRIPTION - FREQUENCY
FREQUENCY: CONTINUOUS
FREQUENCY: CONTINUOUS

## 2022-03-29 NOTE — RT PROTOCOL NOTE
RT Inhaler-Nebulizer Bronchodilator Protocol Note    There is a bronchodilator order in the chart from a provider indicating to follow the RT Bronchodilator Protocol and there is an Initiate RT Inhaler-Nebulizer Bronchodilator Protocol order as well (see protocol at bottom of note). CXR Findings:  No results found. The findings from the last RT Protocol Assessment were as follows:   History Pulmonary Disease: None or smoker <15 pack years  Respiratory Pattern: Dyspnea on exertion or RR 21-25 bpm  Breath Sounds: Slightly diminished and/or crackles  Cough: Strong, spontaneous, non-productive  Indication for Bronchodilator Therapy: Decreased or absent breath sounds,On home bronchodilators  Bronchodilator Assessment Score: 4    Aerosolized bronchodilator medication orders have been revised according to the RT Inhaler-Nebulizer Bronchodilator Protocol below. Respiratory Therapist to perform RT Therapy Protocol Assessment initially then follow the protocol. Repeat RT Therapy Protocol Assessment PRN for score 0-3 or on second treatment, BID, and PRN for scores above 3. No Indications - adjust the frequency to every 6 hours PRN wheezing or bronchospasm, if no treatments needed after 48 hours then discontinue using Per Protocol order mode. If indication present, adjust the RT bronchodilator orders based on the Bronchodilator Assessment Score as indicated below. Use Inhaler orders unless patient has one or more of the following: on home nebulizer, not able to hold breath for 10 seconds, is not alert and oriented, cannot activate and use MDI correctly, or respiratory rate 25 breaths per minute or more, then use the equivalent nebulizer order(s) with same Frequency and PRN reasons based on the score. If a patient is on this medication at home then do not decrease Frequency below that used at home.     0-3 - enter or revise RT bronchodilator order(s) to equivalent RT Bronchodilator order with Frequency of every 4 hours PRN for wheezing or increased work of breathing using Per Protocol order mode. 4-6 - enter or revise RT Bronchodilator order(s) to two equivalent RT bronchodilator orders with one order with BID Frequency and one order with Frequency of every 4 hours PRN wheezing or increased work of breathing using Per Protocol order mode. 7-10 - enter or revise RT Bronchodilator order(s) to two equivalent RT bronchodilator orders with one order with TID Frequency and one order with Frequency of every 4 hours PRN wheezing or increased work of breathing using Per Protocol order mode. 11-13 - enter or revise RT Bronchodilator order(s) to one equivalent RT bronchodilator order with QID Frequency and an Albuterol order with Frequency of every 4 hours PRN wheezing or increased work of breathing using Per Protocol order mode. Greater than 13 - enter or revise RT Bronchodilator order(s) to one equivalent RT bronchodilator order with every 4 hours Frequency and an Albuterol order with Frequency of every 2 hours PRN wheezing or increased work of breathing using Per Protocol order mode. RT to enter RT Home Evaluation for COPD & MDI Assessment order using Per Protocol order mode.     Electronically signed by Talia Herrera RCP on 3/29/2022 at 5:58 PM

## 2022-03-29 NOTE — PLAN OF CARE
Problem: Impaired respiratory status  Goal: Clear lung sounds  Description: Clear lung sounds  3/29/2022 0750 by Floresita Gupta RCP  Outcome: Ongoing  Note: Txs to help improve lung aeration.

## 2022-03-29 NOTE — PLAN OF CARE
Problem: Pain:  Goal: Pain level will decrease  Description: Pain level will decrease  Outcome: Met This Shift     Problem: Falls - Risk of:  Goal: Will remain free from falls  Description: Will remain free from falls  Outcome: Met This Shift     Problem: Skin Integrity:  Goal: Absence of new skin breakdown  Description: Absence of new skin breakdown  Outcome: Met This Shift     Problem: Discharge Planning:  Goal: Discharged to appropriate level of care  Description: Discharged to appropriate level of care  Outcome: Ongoing     Problem: Nutrition  Goal: Optimal nutrition therapy  3/29/2022 0325 by Checo Jaquez RN  Outcome: Ongoing

## 2022-03-29 NOTE — PLAN OF CARE
Problem: Impaired respiratory status  Goal: Clear lung sounds  Description: Clear lung sounds  3/29/2022 1757 by Patience Vang RCP  Outcome: Ongoing  Note: Txs to help improve lung aeration.

## 2022-03-29 NOTE — RT PROTOCOL NOTE
RT Inhaler-Nebulizer Bronchodilator Protocol Note    There is a bronchodilator order in the chart from a provider indicating to follow the RT Bronchodilator Protocol and there is an Initiate RT Inhaler-Nebulizer Bronchodilator Protocol order as well (see protocol at bottom of note). CXR Findings:  No results found. The findings from the last RT Protocol Assessment were as follows:   History Pulmonary Disease: None or smoker <15 pack years  Respiratory Pattern: Dyspnea on exertion or RR 21-25 bpm  Breath Sounds: Slightly diminished and/or crackles  Cough: Strong, spontaneous, non-productive  Indication for Bronchodilator Therapy: Decreased or absent breath sounds,On home bronchodilators  Bronchodilator Assessment Score: 4    Aerosolized bronchodilator medication orders have been revised according to the RT Inhaler-Nebulizer Bronchodilator Protocol below. Respiratory Therapist to perform RT Therapy Protocol Assessment initially then follow the protocol. Repeat RT Therapy Protocol Assessment PRN for score 0-3 or on second treatment, BID, and PRN for scores above 3. No Indications - adjust the frequency to every 6 hours PRN wheezing or bronchospasm, if no treatments needed after 48 hours then discontinue using Per Protocol order mode. If indication present, adjust the RT bronchodilator orders based on the Bronchodilator Assessment Score as indicated below. Use Inhaler orders unless patient has one or more of the following: on home nebulizer, not able to hold breath for 10 seconds, is not alert and oriented, cannot activate and use MDI correctly, or respiratory rate 25 breaths per minute or more, then use the equivalent nebulizer order(s) with same Frequency and PRN reasons based on the score. If a patient is on this medication at home then do not decrease Frequency below that used at home.     0-3 - enter or revise RT bronchodilator order(s) to equivalent RT Bronchodilator order with Frequency of every 4 hours PRN for wheezing or increased work of breathing using Per Protocol order mode. 4-6 - enter or revise RT Bronchodilator order(s) to two equivalent RT bronchodilator orders with one order with BID Frequency and one order with Frequency of every 4 hours PRN wheezing or increased work of breathing using Per Protocol order mode. 7-10 - enter or revise RT Bronchodilator order(s) to two equivalent RT bronchodilator orders with one order with TID Frequency and one order with Frequency of every 4 hours PRN wheezing or increased work of breathing using Per Protocol order mode. 11-13 - enter or revise RT Bronchodilator order(s) to one equivalent RT bronchodilator order with QID Frequency and an Albuterol order with Frequency of every 4 hours PRN wheezing or increased work of breathing using Per Protocol order mode. Greater than 13 - enter or revise RT Bronchodilator order(s) to one equivalent RT bronchodilator order with every 4 hours Frequency and an Albuterol order with Frequency of every 2 hours PRN wheezing or increased work of breathing using Per Protocol order mode. RT to enter RT Home Evaluation for COPD & MDI Assessment order using Per Protocol order mode.     Electronically signed by Katherin Cárdenas RCP on 3/29/2022 at 7:51 AM

## 2022-03-29 NOTE — PROGRESS NOTES
[facility]   Nephrology progress note  3/29/2022, 10:41 AM      Pt Name:    Keny Meeks  MRN:     [id number]     YOB: 1960  Admit Date:    3/16/2022 10:41 AM  Primary Care Physician:  Moira Bourgeois DO   Room number  7Y-94/973-C    Chief Complaint: Nephrology following for CAROLE/CKD    Subjective:  Patient seen and examined  Seen earlier today during rounds  No chest pain  No shortness of breath  No diarrhea  Creatinine went up to 2.8    Objective:  24HR INTAKE/OUTPUT:      Intake/Output Summary (Last 24 hours) at 3/29/2022 1041  Last data filed at 3/29/2022 0553  Gross per 24 hour   Intake 30 ml   Output 1680 ml   Net -1650 ml     I/O last 3 completed shifts: In: 110 [P.O.:80; I.V.:30]  Out: 2860 [Urine:2860]  No intake/output data recorded. Admission weight: (!) 328 lb (148.8 kg)  Wt Readings from Last 3 Encounters:   03/28/22 (!) 325 lb 13.4 oz (147.8 kg)   03/03/22 (!) 333 lb 6.4 oz (151.2 kg)   02/15/22 (!) 338 lb 9.6 oz (153.6 kg)     Body mass index is 42.99 kg/m².     Physical examination  VITALS:     Vitals:    03/29/22 0353 03/29/22 0557 03/29/22 0730 03/29/22 0745   BP: (!) 118/59 (!) 155/94 (!) 159/90    Pulse: 59  69    Resp: 18  18    Temp: 98.7 °F (37.1 °C)  97.9 °F (36.6 °C)    TempSrc: Axillary  Oral    SpO2: 91%  95% 93%   Weight:       Height:         General Appearance: alert and cooperative with exam, appears comfortable, no distress  Mouth/Throat: Oral mucosa moist  Lungs:  on room air  Extremities: No significant lower extremity edema  Abd: soft      Lab Data  CBC:   Recent Labs     03/29/22  0452   WBC 5.3   HGB 8.6*   HCT 29.2*        BMP:  Recent Labs     03/27/22  0538 03/29/22  0452    142   K 3.9 3.5    105   CO2 24 22*   BUN 36* 36*   CREATININE 2.0* 2.8*   GLUCOSE 81 100   CALCIUM 8.8 8.7   MG  --  1.9   PHOS  --  4.3     Hepatic: No results for input(s): LABALBU, AST, ALT, ALB, BILITOT, ALKPHOS in the last 72 hours.      Meds:  Infusion:    sodium chloride      dextrose       Meds:    hydrALAZINE  25 mg Oral 3 times per day    naloxegol  12.5 mg Oral QAM    polyethylene glycol  17 g Oral Daily    carvedilol  50 mg Oral BID    [Held by provider] bumetanide  1 mg Oral Daily    tamsulosin  0.4 mg Oral Nightly    amLODIPine  10 mg Oral Daily    famotidine  20 mg Oral BID    lidocaine  1 patch TransDERmal Daily    Or    lidocaine  2 patch TransDERmal Daily    Or    lidocaine  3 patch TransDERmal Daily    heparin (porcine)  5,000 Units SubCUTAneous Q8H    ipratropium-albuterol  1 ampule Inhalation BID    sodium chloride flush  5-40 mL IntraVENous 2 times per day    [Held by provider] amitriptyline  10 mg Oral Nightly    doxepin  50 mg Oral Nightly    budesonide-formoterol  2 puff Inhalation BID    montelukast  10 mg Oral Nightly    levothyroxine  175 mcg Oral Daily    rOPINIRole  1 mg Oral Q24H    oxybutynin  10 mg Oral Daily    rOPINIRole  2 mg Oral Nightly     Meds prn: oxyCODONE-acetaminophen, magnesium sulfate, melatonin, ipratropium-albuterol, sodium chloride flush, sodium chloride, ondansetron **OR** ondansetron, acetaminophen **OR** acetaminophen, potassium chloride **OR** potassium alternative oral replacement **OR** potassium chloride, albuterol sulfate HFA, [Held by provider] cyclobenzaprine, glucagon (rDNA), dextrose, glucose, dextrose bolus (hypoglycemia)       Impression and Plan:  1. CAROLE on CKD 3A. Baseline ~ 1.3  Multiple risk factors for CKD including chronic diastolic heart failure/cardiorenal, volume depletion from diarrhea, diabetic nephropathy and also due to post adaptive FSGS from morbid obesity  Creatinine today went up to 2.8  We will hold Bumex  Trial of IV fluids    2. Volume overload. Hold Bumex due to CAROLE  3. Proteinuria secondary to diabetic nephropathy +/-secondary FSGS (post adaptive from obesity)  4. Abdominal wound/infected hernia mesh.   Status post surgery: Lysis of adhesions, explantation of infected mesh  5. Chronic diastolic dysfunction with volume overload. continue with Bumex  6. Chronically uncontrolled diabetes mellitus  7. Essential hypertension.   On Coreg, Norvasc and hydralazine    D/W patient     Johny Badillo MD  [facility]

## 2022-03-29 NOTE — PLAN OF CARE
Problem: Pain:  Goal: Pain level will decrease  Description: Pain level will decrease  3/29/2022 1722 by Blake Pedro RN  Outcome: Ongoing  Note: Pain Assessment: 0-10  Pain Level: 8   Patient's Stated Pain Goal: No pain   Is pain goal met at this time? No     Non-Pharmaceutical Pain Intervention(s): Rest,Repositioned    Patient states pain relief from PRN pain medications. Pain reassessed one hour post PRN pain medication given. Patient rates pain 8 on HYUN 0-10 scale. Patient using heating pad on back. Problem: Falls - Risk of:  Goal: Will remain free from falls  Description: Will remain free from falls  3/29/2022 1722 by Blake Pedro RN  Outcome: Ongoing  Note: Fall assessment completed. Patient using call light appropriately to call for assistance with ambulation to bathroom. Personal items within reach. Patient is also compliant with use of non-skid slippers. Hourly rounding. Bed and chair alarm in use. Problem: Skin Integrity:  Goal: Absence of new skin breakdown  Description: Absence of new skin breakdown  3/29/2022 1722 by Blake Pedro RN  Outcome: Ongoing  Note: Midline incision intact with staples. Patient turns and repositions per self. Skin is clean, dry, and intact. Problem: Discharge Planning:  Goal: Discharged to appropriate level of care  Description: Discharged to appropriate level of care  3/29/2022 1722 by Blake Pedro RN  Outcome: Ongoing  Note: Patient possibly discharging tomorrow if creatinine decreases. IV hydration. Patient will return home with wife. Care plan reviewed with patient. Patient verbalizes understanding of the plan of care and contribute to goal setting.

## 2022-03-29 NOTE — PROGRESS NOTES
Hospitalist Progress Note    Patient: Shady Console  Unit/Bed: 2U-34/129-T  YOB: 1960  MRN: [id number]  Acct: [de-identified]    PCP: José Manuel Leyva DO    Date of Admission: 3/16/2022    Assessment/Plan:    POD#4 s/p explantation of abdominal mesh - Removal of infected abdominal mesh and lysis of multiple bowel loop adhesions. Advanced to full diet and tolerating well. - Continue incision care; no erythema or drainage noted  - Surgery following    Stage 1 CAROLE on CKD 3a with renal progression to 3b - Worsening. Multifactorial cause including FSGS and questionable type 1 cardiorenal syndrome and recent diuretic use. Renal progression appears to have occurred around 2/2022 and likely medication related with adjustments to home Bumex regimen. This appears to be his new baseline.  - Hold Bumex  - Avoid nephrotoxic agents and renally dose medications  - Daily standing weights, strict I/O  - Daily BMP  - Nephrology following    Elevated microalbumin/Creatinine ratio - 1060mg/g. Likely secondary to hypertension and diabetes. - Nephrology following    Chronic systolic heart failure NYHA II - Without exacerbation. EF=55-60% on ECHO 5/25/21. Implanted CardioMEMS 6/16/2021. [address] 12/1/2020 demonstrating elevated LVEDP. Patient follows with CHF clinic. CardioMEMS download completed 3/22/22 with only mild elevation from baseline.  - Patient not currently taking Entresto, spironolactone, carvedilol or Bumex per chart review and recommendations of outside provider per patient  - Holding Bumex for CAROLE  - Cardiology following     COPD GOLD 2 - Without exacerbation. PFT 3/15/2021 FEV1=75% of predicted. - Continue home Montelukast, Symbicort  - Duonebs twice daily  - Continue Acapella and IS     NIDDM2 - Uncontrolled. A1c=9.9 (5/24/21).  Dietician/nutrition has counseled that patient.  - high dose ssi, POCT glucose 4x daily AC/HS, Hypoglycemia protocol  - Inpatient BG goal 140-180; tight glucose control will be imperative in the laci and post-operative state for adequate healing.     Primary HTN - Uncontrolled. Likely secondary to pain. - Continue [id number] daily, Coreg 50mg twice dialy and Hydralazine 25mg three times daily. Iron deficiency anemia - Iron 40, Ferritin 423, Iron sat 20%. - Following with nephrology; EPO at their discretion    Elevated troponin - Secondary to type II demand ischemia. Patient denies chest pain, EKG without ST or T-wave abnormalities. - No indication to trend    LAURIE - CPAP at home. No complications  - Continue home CPAP at night.     BPH - Noted  - Continue Tamsulosin     Hx of hypothyroidism - Noted  - Continue Synthroid     Hx of chronic back pain - Noted  - Continue home percocet  - Hold Flexeril    High anion gap metabolic acidosis - Resolved. Secondary to sepsis and elevated lactic acid. ?Pulmonary embolism - Resolved. Pulmonary embolism was considered within the ED, but CTA not obtainable due to worsening renal function. V/Q scan demonstrated low probability of pulmonary embolism. Moderate Hypokalemia - Resolved. Secondary to diuretic use. Patient also received 80mg Lasix within the ED. Magnesium and phosphorus within normal limits  - Continue potassium replacement as needed  - Continuous telemetry    Acute hypoxic respiratory failure - Resolved. Weaned to room air. Likely secondary to fluid overload. Negative viral respiratory panel 3/19/22.  - Continue Duoneb twice daily    Sepsis secondary to abdominal wound - Resolved. Present on arrival. SIRS 2/4 (Tachycardia and tachypnea). Abdominal wound is from dehiscence of hernia repair in 2018 with surgical mesh exposure, now with purulent discharge. Surgical repair as listed above. MRSA nares negative, Blood Cx NGTD, Wound Cx MSSA and corynebacterium species.       Expected discharge date:  3/29/22    Disposition:    [x] Home       [] TCU       [] Rehab       [] Psych       [] SNF       [] [place]       [] Other -    Chief Complaint: Fever    Hospital Course:  Angelica Billingsley is a 63 y.o. male with PMHx of HTN, CHF, BPH, LAURIE, hypothyroidism, chronic back pain, CKD, and NIDDM2 who presented to [facility] with complaint of fever that began approximately 5 days prior to presentation that was self-reported as high as 101F. The patient had associated symptoms of diaphoresis. He noted that he also has increased cough and shortness of breath from his baseline during these complaints. He has been taking Ibuprofen for his fever and OTC cough medicine for his cough which he states helps. The patient reported that his shortness of breath as well as his cough have been occurring for the last 4 months and have not worsened over that time period. He also stated that he is recently been instructed to hold his Bumex as well as his Entresto from his nephrologist and CHF clinic respectively. He denies headache, lightheadedness, change in vision, rhinorrhea, congestion, sore throat, cough, hemoptysis, chest pain, palpitations, abdominal pain, nausea, vomiting, hematemesis, change in bowel/bladder habits, hematochezia, hematuria, weakness, difficulty with ambulation, numbness/tingling, or known exposure to sick contacts. He admits to fever, chills, AKINS, PND, and shortness of breath. Within the emergency department the patient was administered Lasix 80mg, ASA 324mg, 1 dose of Ceftriaxone 1g, and 4mg morphine, He also underwent CT of the chest which demonstrated borderline cardiomegaly, calcification of the LAD and RCA coronary arteries, small right pleural effusion and splenomegaly. The patient was admitted to the hospital service for further care and management. Orders were placed for general surgery consult. Subjective (past 24 hours): Per nursing, no acute events overnight. The patient was sitting in bedside chair during examination today without any specific complaints.  He stated he is having bowel movements, tolerating full diet well, and has been ambulating in the halls. He stated his abdominal pain is well controlled with current pain medication. Review of Systems: 12 point review of systems completed and pertinent positives are noted in the HPI. All other systems reviewed and negative.     Medications:  Reviewed    Infusion Medications    sodium chloride      dextrose       Scheduled Medications    hydrALAZINE  25 mg Oral 3 times per day    naloxegol  12.5 mg Oral QAM    polyethylene glycol  17 g Oral Daily    carvedilol  50 mg Oral BID    [Held by provider] bumetanide  1 mg Oral Daily    tamsulosin  0.4 mg Oral Nightly    amLODIPine  10 mg Oral Daily    famotidine  20 mg Oral BID    lidocaine  1 patch TransDERmal Daily    Or    lidocaine  2 patch TransDERmal Daily    Or    lidocaine  3 patch TransDERmal Daily    heparin (porcine)  5,000 Units SubCUTAneous Q8H    ipratropium-albuterol  1 ampule Inhalation BID    sodium chloride flush  5-40 mL IntraVENous 2 times per day    [Held by provider] amitriptyline  10 mg Oral Nightly    doxepin  50 mg Oral Nightly    budesonide-formoterol  2 puff Inhalation BID    montelukast  10 mg Oral Nightly    levothyroxine  175 mcg Oral Daily    rOPINIRole  1 mg Oral Q24H    oxybutynin  10 mg Oral Daily    rOPINIRole  2 mg Oral Nightly     PRN Meds: oxyCODONE-acetaminophen, magnesium sulfate, HYDROmorphone **OR** HYDROmorphone, melatonin, ipratropium-albuterol, sodium chloride flush, sodium chloride, ondansetron **OR** ondansetron, acetaminophen **OR** acetaminophen, potassium chloride **OR** potassium alternative oral replacement **OR** potassium chloride, albuterol sulfate HFA, [Held by provider] cyclobenzaprine, glucagon (rDNA), dextrose, glucose, dextrose bolus (hypoglycemia)      Intake/Output Summary (Last 24 hours) at 3/29/2022 0768  Last data filed at 3/29/2022 0565  Gross per 24 hour   Intake 110 ml   Output 2260 ml   Net -2150 ml       Diet:  ADULT ORAL NUTRITION SUPPLEMENT; Breakfast, Dinner; Wound Healing Oral Supplement  ADULT DIET; Regular; 4 carb choices (60 gm/meal); Low Sodium (2 gm)    Exam:  BP (!) 159/90   Pulse 69   Temp 97.9 °F (36.6 °C) (Oral)   Resp 18   Ht 6' 1\" (1.854 m)   Wt (!) 325 lb 13.4 oz (147.8 kg)   SpO2 95%   BMI 42.99 kg/m²     Physical Exam  Vitals and nursing note reviewed. Constitutional:       General: He is awake. Appearance: Normal appearance. He is morbidly obese. Interventions: Nasal cannula in place. HENT:      Head: Normocephalic and atraumatic. Right Ear: External ear normal.      Left Ear: External ear normal.      Nose: Nose normal.      Mouth/Throat:      Mouth: Mucous membranes are moist.      Pharynx: Oropharynx is clear. Eyes:      Conjunctiva/sclera: Conjunctivae normal.      Pupils: Pupils are equal, round, and reactive to light. Cardiovascular:      Rate and Rhythm: Normal rate and regular rhythm. Pulses: Normal pulses. Dorsalis pedis pulses are 2+ on the right side and 2+ on the left side. Posterior tibial pulses are 2+ on the right side and 2+ on the left side. Heart sounds: Normal heart sounds. Pulmonary:      Effort: Pulmonary effort is normal.      Breath sounds: Normal breath sounds. Decreased air movement present. No wheezing. Abdominal:      General: A surgical scar is present. Bowel sounds are normal.      Palpations: Abdomen is soft. Tenderness: There is no abdominal tenderness. Musculoskeletal:         General: Normal range of motion. Cervical back: Normal range of motion and neck supple. Right lower le+ Pitting Edema present. Left lower le+ Pitting Edema present. Skin:     General: Skin is warm and dry. Capillary Refill: Capillary refill takes less than 2 seconds. Neurological:      General: No focal deficit present. Mental Status: He is alert and oriented to person, place, and time.  Mental status is at baseline. GCS: GCS eye subscore is 4. GCS verbal subscore is 5. GCS motor subscore is 6. Psychiatric:         Attention and Perception: Attention and perception normal.         Mood and Affect: Mood and affect normal.         Speech: Speech normal.         Behavior: Behavior normal. Behavior is cooperative. Thought Content: Thought content normal.         Cognition and Memory: Cognition and memory normal.         Judgment: Judgment normal.         Labs:  Recent Labs     03/29/22 0452   WBC 5.3   HGB 8.6*   HCT 29.2*        Recent Labs     03/27/22  0538 03/29/22 0452    142   K 3.9 3.5    105   CO2 24 22*   BUN 36* 36*   CREATININE 2.0* 2.8*   CALCIUM 8.8 8.7   PHOS  --  4.3     No results for input(s): AST, ALT, BILIDIR, BILITOT, ALKPHOS in the last 72 hours. No results for input(s): INR in the last 72 hours. No results for input(s): Chyrel Lions in the last 72 hours. Microbiology:    Urinalysis:   Lab Results   Component Value Date    NITRU NEGATIVE 03/23/2022    WBCUA 5-9 03/23/2022    BACTERIA NONE SEEN 03/23/2022    RBCUA 25-50 03/23/2022    BLOODU MODERATE 03/23/2022    SPECGRAV 1.015 03/23/2022    GLUCOSEU 500 04/13/2021       Radiology:  CT ABDOMEN PELVIS WO CONTRAST Additional Contrast? None   Final Result   Gas and fluid external to the abdomen and near the umbilicus along the patient's hernia mesh level. No evidence of intra-abdominal abscess. No acute abdominal or pelvic abnormalities. Multiple chronic findings. Stable appearance of the lung bases. **This report has been created using voice recognition software. It may contain minor errors which are inherent in voice recognition technology. **      Final report electronically signed by Dr. Son Chavez on 3/19/2022 6:32 PM      XR CHEST PORTABLE   Final Result   Impression:   Cardiomegaly, no CHF. Small right pleural effusion.       This document has been electronically signed by: Zofia Munroe Jesus Mathias MD on    03/18/2022 07:27 PM      US RENAL COMPLETE   Final Result       1. Bilateral renal cysts. 2. No definite evidence of hydronephrosis on either side. 3. The bladder was not visualized. .               **This report has been created using voice recognition software. It may contain minor errors which are inherent in voice recognition technology. **      Final report electronically signed by DR Lois Whitlock on 3/18/2022 11:30 AM      NM LUNG VENT/PERFUSION (VQ)   Final Result   Low probability of pulmonary embolus. Normal ventilation bilaterally with good washout and no areas of trapping. This document has been electronically signed by: Jade Espinosa MD on    03/16/2022 05:52 PM      CT CHEST WO CONTRAST   Final Result       1. Borderline cardiomegaly. Calcification in the left anterior descending and right coronary arteries. 2. 13 mm metallic density in the left lower lobe pulmonary artery, new since previous CT scan of the chest dated 24th of May 2021. 3. Small right pleural effusion. 4. Small mediastinal lymph nodes. 5. Thickening off the interstitial lung markings. 6. Thoracic spondylosis. 7. Splenomegaly. 8. Small hiatal hernia. **This report has been created using voice recognition software. It may contain minor errors which are inherent in voice recognition technology. **      Final report electronically signed by DR Lois Whitlock on 3/16/2022 12:47 PM      XR CHEST PORTABLE   Final Result   1. Mild cardiomegaly and increased pulmonary vascularity. 2. Otherwise negative chest x-ray. .               **This report has been created using voice recognition software. It may contain minor errors which are inherent in voice recognition technology. **      Final report electronically signed by DR Lois Whitlock on 3/16/2022 11:53 AM          DVT prophylaxis:  [] Lovenox  [] SCDs  [x] SQ Heparin  [] Encourage ambulation  [] Already on anticoagulation  [] Other -      Code Status: Full Code    PT/OT Evaluation Status: N/A    Telemetry:  [x] Yes  [] No    Electronically signed by Aarti Vargas DO, MBA on 3/29/2022 at 7:47 AM

## 2022-03-29 NOTE — CARE COORDINATION
Collaborative Discharge Planning    Delio Irvin  :  1960  MRN:  [id number]    ADMIT DATE:  3/16/2022      Discharge Planning Discharge Planning  Patient expects to be discharged to[facility] Board Notes /Social Work Whiteboard Notes  /Social Work Whiteboard: 3/24 CM: plans home w spouse Johanna Boothe; current w Wound/CHF Clinics, has CPAP    Procedure 3/25/2022  1. Removal of portion of infected mesh. 2.  Lysis of significant adhesions. Discharge Plan Home with family    Discharge Milestones and Delays: Clinical status.  Creatinine 2.8, Nephrology and General Surgery following, Heparin subq, Elavil and Bumex on hold, Duoneb nebulizer, Glycolax and Movantik scheduled, daily BMP,  Incentive spirometry, acapella, strict I & O.          SIGNED:  Lulu Pleitez RN   3/29/2022, 11:56 AM

## 2022-03-29 NOTE — PROGRESS NOTES
General Surgery  Dr Dorcus Duverney  Daily Progress Note      Patient:  Keny Meeks      Unit/Bed:5K-24/024-A    YOB: 1960    MRN: [id number]     Acct: [de-identified]     Admit date: 3/16/2022    Subjective: patient sitting in chair, ding well. Postoperative pain as expected on percocet for chronic pain. Discussed pain control. Tolerated  Soft diet. Denies chest pain or SOB today. Chart has been reviewed and update by primary RN. All other ROS negative except noted in HPI      Patient Seen, Chart, Consults notes, Labs, Radiology studies reviewed. Past, Family, Social History unchanged from admission. Diet:  ADULT ORAL NUTRITION SUPPLEMENT; Breakfast, Dinner; Wound Healing Oral Supplement  ADULT DIET; Regular; 4 carb choices (60 gm/meal);  Low Sodium (2 gm)    Medications:  Scheduled Meds:   hydrALAZINE  25 mg Oral 3 times per day    naloxegol  12.5 mg Oral QAM    polyethylene glycol  17 g Oral Daily    carvedilol  50 mg Oral BID    [Held by provider] bumetanide  1 mg Oral Daily    tamsulosin  0.4 mg Oral Nightly    amLODIPine  10 mg Oral Daily    famotidine  20 mg Oral BID    lidocaine  1 patch TransDERmal Daily    Or    lidocaine  2 patch TransDERmal Daily    Or    lidocaine  3 patch TransDERmal Daily    heparin (porcine)  5,000 Units SubCUTAneous Q8H    ipratropium-albuterol  1 ampule Inhalation BID    sodium chloride flush  5-40 mL IntraVENous 2 times per day    [Held by provider] amitriptyline  10 mg Oral Nightly    doxepin  50 mg Oral Nightly    budesonide-formoterol  2 puff Inhalation BID    montelukast  10 mg Oral Nightly    levothyroxine  175 mcg Oral Daily    rOPINIRole  1 mg Oral Q24H    oxybutynin  10 mg Oral Daily    rOPINIRole  2 mg Oral Nightly     Continuous Infusions:   sodium chloride      dextrose       PRN Meds:oxyCODONE-acetaminophen, magnesium sulfate, melatonin, ipratropium-albuterol, sodium chloride flush, sodium chloride, ondansetron **OR** ondansetron, acetaminophen **OR** acetaminophen, potassium chloride **OR** potassium alternative oral replacement **OR** potassium chloride, albuterol sulfate HFA, [Held by provider] cyclobenzaprine, glucagon (rDNA), dextrose, glucose, dextrose bolus (hypoglycemia)    Objective:    CBC:   Recent Labs     03/29/22 0452   WBC 5.3   HGB 8.6*        BMP:    Recent Labs     03/27/22  0538 03/29/22 0452    142   K 3.9 3.5    105   CO2 24 22*   BUN 36* 36*   CREATININE 2.0* 2.8*   GLUCOSE 81 100     Calcium:  Recent Labs     03/29/22 0452   CALCIUM 8.7     Ionized Calcium:No results for input(s): IONCA in the last 72 hours. Magnesium:  Recent Labs     03/29/22 0452   MG 1.9     Phosphorus:  Recent Labs     03/29/22 0452   PHOS 4.3     BNP:No results for input(s): BNP in the last 72 hours. Glucose:  No results for input(s): POCGLU in the last 72 hours. HgbA1C: No results for input(s): LABA1C in the last 72 hours. INR: No results for input(s): INR in the last 72 hours. Hepatic:   No results for input(s): ALKPHOS, ALT, AST, PROT, BILITOT, BILIDIR, LABALBU in the last 72 hours. Amylase and Lipase:No results for input(s): LACTA, AMYLASE in the last 72 hours. Lactic Acid: No results for input(s): LACTA in the last 72 hours. Troponin:   No results for input(s): CKTOTAL, CKMB, TROPONINT in the last 72 hours. BNP: No results for input(s): BNP in the last 72 hours. Lipids: No results for input(s): CHOL, TRIG, HDL, LDL, LDLCALC in the last 72 hours. ABGs:   Lab Results   Component Value Date    PH 7.42 03/02/2021    PCO2 41 03/02/2021    PO2 69 03/02/2021    HCO3 27 03/02/2021    O2SAT 94 03/02/2021       Radiology reports as per the Radiologist  Radiology: CT CHEST WO CONTRAST    Result Date: 3/16/2022  PROCEDURE: CT CHEST WO CONTRAST CLINICAL INFORMATION: cough. COMPARISON: Chest x-ray obtained on the same day. CT scan dated 24th of May 2021. . TECHNIQUE: 5 mm noncontrast axial images were obtained through the chest. Sagittal and coronal reconstructions were obtained. All CT scans at this facility use dose modulation, iterative reconstruction, and/or weight-based dosing when appropriate to reduce radiation dose to as low as reasonably achievable. FINDINGS: There is borderline cardiomegaly. There is calcification in the left anterior descending and right coronary arteries. . The aorta is of normal caliber. There is a 13 mm metallic density in the left lower lobe pulmonary artery. This appears new since previous CT pulmonary angiogram dated 24th of May 2021. Rell Matthew There is small mediastinal lymph nodes present. There is no pericardial effusion. There is a small right pleural effusion There is thickening of the interstitial lung markings. There appears to be slightly increased fluid along the fissures. There is thoracic spondylosis. . Is a small hiatal hernia There is splenomegaly. 1. Borderline cardiomegaly. Calcification in the left anterior descending and right coronary arteries. 2. 13 mm metallic density in the left lower lobe pulmonary artery, new since previous CT scan of the chest dated 24th of May 2021. 3. Small right pleural effusion. 4. Small mediastinal lymph nodes. 5. Thickening off the interstitial lung markings. 6. Thoracic spondylosis. 7. Splenomegaly. 8. Small hiatal hernia. **This report has been created using voice recognition software. It may contain minor errors which are inherent in voice recognition technology. ** Final report electronically signed by DR Zenaida Zamarripa on 3/16/2022 12:47 PM    NM LUNG VENT/PERFUSION (VQ)    Result Date: 3/16/2022  NM Lung Perfusion/ventilation Comparison: None Findings 3.10 mCi technetium 99m MAA used. 6.80 MCI xenon-133 gas used. Wash-in equilibrium and washout images show normal xenon-133 ventilation scan. The lungs are almost completely clear of xenon within 2 minutes. Perfusion lung images show no defects. Low probability of pulmonary embolus.  Normal ventilation bilaterally with good washout and no areas of trapping. This document has been electronically signed by: Harinder Plummer MD on 03/16/2022 05:52 PM    XR CHEST PORTABLE    Result Date: 3/16/2022  PROCEDURE: XR CHEST PORTABLE CLINICAL INFORMATION: fever. COMPARISON: Chest x-ray dated third of March 2022. TECHNIQUE: AP upright view of the chest. FINDINGS: There is mild cardiomegaly. .The mediastinum is not widened. There are no pulmonary infiltrates or effusions. The pulmonary vascularity is increased. There is thoracic spondylosis. 1. Mild cardiomegaly and increased pulmonary vascularity. 2. Otherwise negative chest x-ray. . **This report has been created using voice recognition software. It may contain minor errors which are inherent in voice recognition technology. ** Final report electronically signed by DR Albert Bill on 3/16/2022 11:53 AM       Physical Exam:  Vitals: BP (!) 159/90   Pulse 69   Temp 97.9 °F (36.6 °C) (Oral)   Resp 18   Ht 6' 1\" (1.854 m)   Wt (!) 325 lb 13.4 oz (147.8 kg)   SpO2 93%   BMI 42.99 kg/m²   24 hour intake/output:    Intake/Output Summary (Last 24 hours) at 3/29/2022 1218  Last data filed at 3/29/2022 0553  Gross per 24 hour   Intake --   Output 1680 ml   Net -1680 ml     Last 3 weights: Wt Readings from Last 3 Encounters:   03/28/22 (!) 325 lb 13.4 oz (147.8 kg)   03/03/22 (!) 333 lb 6.4 oz (151.2 kg)   02/15/22 (!) 338 lb 9.6 oz (153.6 kg)       General appearance - oriented to person, place, and time and overweight  HEENT: Normocephalic and Atraumatic  Chest - clear to auscultation, no wheezes, rales or rhonchi, symmetric air entry  Cardiovascular - normal rate and regular rhythm  Abdomen - BS active.  Soft, round  Surgical Site: incision is well approximated, staples intact, no drainage no erythema   Neurological - Alert and oriented and Normal speech  Integumentary - Skin color, texture, turgor normal. No Rashes or lesions  Musculoskeletal -Full ROM times 4 extremities      DVT prophylaxis: [] Lovenox                                 [] SCDs                                 [x] SQ Heparin                                 [] Encourage ambulation           [] Already on Anticoagulation           X Aspirin     Assessment:  1. S/p removal of portion of infected mesh, lysis of significant adhesions POD# 6  2. Postoperative pain as expected   3. CAROLE on CKD  4. CHF- HTN   5. COPD  6. DM  7. LAURIE  8. Chronic back pain - percocet   9. 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 [address]    CAROLE (acute kidney injury) ([facility] [address]    Chronic wound infection of abdomen  Resolved Problems:    * No resolved hospital problems. *      Plan:    1.  soft diet  2. Analgesia and antiemetics as needed adjusted home percocet   3. Antibiotic therapy  Oral doxy x 1 week   4. Monitor Labs, lytes per protocol  5. medicine management   6. Nephrology and cardiology following   7. Sub q heparin prophylaxis   8. GI prophylaxis   9. Discharge okay from a general surgery standpoint       Electronically signed by SAHIL Santos CNP on 3/29/2022 at 12:18 PM Patient seen and examined independently by me. Above discussed and I agree with CNP. Labs, cultures, and radiographs where available were reviewed. See orders for the updated patient care plan.     Ryne Ledezma MD MD,   3/29/2022   3:27 PM

## 2022-03-30 VITALS
OXYGEN SATURATION: 93 % | HEIGHT: 73 IN | RESPIRATION RATE: 18 BRPM | TEMPERATURE: 98.4 F | WEIGHT: 315 LBS | HEART RATE: 62 BPM | SYSTOLIC BLOOD PRESSURE: 145 MMHG | DIASTOLIC BLOOD PRESSURE: 77 MMHG | BODY MASS INDEX: 41.75 KG/M2

## 2022-03-30 LAB
ANION GAP SERPL CALCULATED.3IONS-SCNC: 12 MEQ/L (ref 8–16)
BUN BLDV-MCNC: 36 MG/DL (ref 7–22)
CALCIUM SERPL-MCNC: 8.2 MG/DL (ref 8.5–10.5)
CHLORIDE BLD-SCNC: 105 MEQ/L (ref 98–111)
CO2: 23 MEQ/L (ref 23–33)
CREAT SERPL-MCNC: 2.5 MG/DL (ref 0.4–1.2)
GFR SERPL CREATININE-BSD FRML MDRD: 27 ML/MIN/1.73M2
GLUCOSE BLD-MCNC: 87 MG/DL (ref 70–108)
MAGNESIUM: 1.9 MG/DL (ref 1.6–2.4)
PHOSPHORUS: 4.1 MG/DL (ref 2.4–4.7)
POTASSIUM SERPL-SCNC: 3.5 MEQ/L (ref 3.5–5.2)
SODIUM BLD-SCNC: 140 MEQ/L (ref 135–145)

## 2022-03-30 PROCEDURE — 80048 BASIC METABOLIC PNL TOTAL CA: CPT

## 2022-03-30 PROCEDURE — 6370000000 HC RX 637 (ALT 250 FOR IP): Performed by: STUDENT IN AN ORGANIZED HEALTH CARE EDUCATION/TRAINING PROGRAM

## 2022-03-30 PROCEDURE — 99024 POSTOP FOLLOW-UP VISIT: CPT | Performed by: SURGERY

## 2022-03-30 PROCEDURE — 84100 ASSAY OF PHOSPHORUS: CPT

## 2022-03-30 PROCEDURE — 6370000000 HC RX 637 (ALT 250 FOR IP): Performed by: INTERNAL MEDICINE

## 2022-03-30 PROCEDURE — 94640 AIRWAY INHALATION TREATMENT: CPT

## 2022-03-30 PROCEDURE — 99239 HOSP IP/OBS DSCHRG MGMT >30: CPT | Performed by: INTERNAL MEDICINE

## 2022-03-30 PROCEDURE — 83735 ASSAY OF MAGNESIUM: CPT

## 2022-03-30 PROCEDURE — 2580000003 HC RX 258: Performed by: STUDENT IN AN ORGANIZED HEALTH CARE EDUCATION/TRAINING PROGRAM

## 2022-03-30 PROCEDURE — 36415 COLL VENOUS BLD VENIPUNCTURE: CPT

## 2022-03-30 PROCEDURE — 99024 POSTOP FOLLOW-UP VISIT: CPT | Performed by: NURSE PRACTITIONER

## 2022-03-30 PROCEDURE — APPSS30 APP SPLIT SHARED TIME 16-30 MINUTES: Performed by: NURSE PRACTITIONER

## 2022-03-30 PROCEDURE — 99232 SBSQ HOSP IP/OBS MODERATE 35: CPT | Performed by: INTERNAL MEDICINE

## 2022-03-30 PROCEDURE — 6360000002 HC RX W HCPCS: Performed by: INTERNAL MEDICINE

## 2022-03-30 PROCEDURE — 94760 N-INVAS EAR/PLS OXIMETRY 1: CPT

## 2022-03-30 PROCEDURE — 6370000000 HC RX 637 (ALT 250 FOR IP): Performed by: NURSE PRACTITIONER

## 2022-03-30 RX ORDER — DOXYCYCLINE HYCLATE 100 MG
100 TABLET ORAL EVERY 12 HOURS SCHEDULED
Qty: 14 TABLET | Refills: 0 | Status: SHIPPED | OUTPATIENT
Start: 2022-03-30 | End: 2022-04-06

## 2022-03-30 RX ORDER — LEVOTHYROXINE SODIUM 175 UG/1
175 TABLET ORAL DAILY
Qty: 30 TABLET | Refills: 3 | Status: SHIPPED | OUTPATIENT
Start: 2022-03-31

## 2022-03-30 RX ORDER — LANOLIN ALCOHOL/MO/W.PET/CERES
4.5 CREAM (GRAM) TOPICAL NIGHTLY PRN
Qty: 45 TABLET | Refills: 3 | Status: SHIPPED | OUTPATIENT
Start: 2022-03-30 | End: 2022-10-24

## 2022-03-30 RX ORDER — AMLODIPINE BESYLATE 10 MG/1
10 TABLET ORAL DAILY
Qty: 30 TABLET | Refills: 3 | Status: SHIPPED | OUTPATIENT
Start: 2022-03-31 | End: 2022-08-17

## 2022-03-30 RX ORDER — BUMETANIDE 1 MG/1
1 TABLET ORAL DAILY
Qty: 60 TABLET | Refills: 6 | Status: SHIPPED | OUTPATIENT
Start: 2022-04-02 | End: 2022-04-14

## 2022-03-30 RX ORDER — CARVEDILOL 25 MG/1
50 TABLET ORAL 2 TIMES DAILY
Qty: 60 TABLET | Refills: 3 | Status: SHIPPED | OUTPATIENT
Start: 2022-03-30 | End: 2022-05-24 | Stop reason: SDUPTHER

## 2022-03-30 RX ORDER — HYDRALAZINE HYDROCHLORIDE 25 MG/1
25 TABLET, FILM COATED ORAL EVERY 8 HOURS SCHEDULED
Qty: 90 TABLET | Refills: 3 | Status: ON HOLD | OUTPATIENT
Start: 2022-03-30 | End: 2022-08-17 | Stop reason: SDUPTHER

## 2022-03-30 RX ORDER — POTASSIUM CHLORIDE 20 MEQ/1
20 TABLET, EXTENDED RELEASE ORAL ONCE
Status: COMPLETED | OUTPATIENT
Start: 2022-03-30 | End: 2022-03-30

## 2022-03-30 RX ADMIN — SODIUM CHLORIDE, PRESERVATIVE FREE 10 ML: 5 INJECTION INTRAVENOUS at 07:59

## 2022-03-30 RX ADMIN — ROPINIROLE HYDROCHLORIDE 1 MG: 1 TABLET, FILM COATED ORAL at 13:50

## 2022-03-30 RX ADMIN — CARVEDILOL 50 MG: 25 TABLET, FILM COATED ORAL at 07:55

## 2022-03-30 RX ADMIN — OXYCODONE AND ACETAMINOPHEN 1 TABLET: 10; 325 TABLET ORAL at 07:51

## 2022-03-30 RX ADMIN — OXYBUTYNIN CHLORIDE 10 MG: 10 TABLET, EXTENDED RELEASE ORAL at 07:53

## 2022-03-30 RX ADMIN — AMLODIPINE BESYLATE 10 MG: 10 TABLET ORAL at 08:00

## 2022-03-30 RX ADMIN — HYDRALAZINE HYDROCHLORIDE 25 MG: 25 TABLET, FILM COATED ORAL at 06:07

## 2022-03-30 RX ADMIN — IPRATROPIUM BROMIDE AND ALBUTEROL SULFATE 1 AMPULE: .5; 3 SOLUTION RESPIRATORY (INHALATION) at 17:44

## 2022-03-30 RX ADMIN — BUDESONIDE AND FORMOTEROL FUMARATE DIHYDRATE 2 PUFF: 160; 4.5 AEROSOL RESPIRATORY (INHALATION) at 08:05

## 2022-03-30 RX ADMIN — HEPARIN SODIUM 5000 UNITS: 5000 INJECTION INTRAVENOUS; SUBCUTANEOUS at 08:00

## 2022-03-30 RX ADMIN — LEVOTHYROXINE SODIUM 175 MCG: 0.15 TABLET ORAL at 06:08

## 2022-03-30 RX ADMIN — OXYCODONE AND ACETAMINOPHEN 1 TABLET: 10; 325 TABLET ORAL at 12:55

## 2022-03-30 RX ADMIN — NALOXEGOL OXALATE 12.5 MG: 12.5 TABLET, FILM COATED ORAL at 07:54

## 2022-03-30 RX ADMIN — FAMOTIDINE 20 MG: 20 TABLET ORAL at 07:51

## 2022-03-30 RX ADMIN — DOXYCYCLINE HYCLATE 100 MG: 100 TABLET, COATED ORAL at 07:58

## 2022-03-30 RX ADMIN — CARVEDILOL 50 MG: 25 TABLET, FILM COATED ORAL at 16:42

## 2022-03-30 RX ADMIN — POTASSIUM CHLORIDE 20 MEQ: 20 TABLET, EXTENDED RELEASE ORAL at 11:16

## 2022-03-30 RX ADMIN — HYDRALAZINE HYDROCHLORIDE 25 MG: 25 TABLET, FILM COATED ORAL at 13:50

## 2022-03-30 RX ADMIN — IPRATROPIUM BROMIDE AND ALBUTEROL SULFATE 1 AMPULE: .5; 3 SOLUTION RESPIRATORY (INHALATION) at 07:58

## 2022-03-30 ASSESSMENT — PAIN DESCRIPTION - FREQUENCY: FREQUENCY: INTERMITTENT

## 2022-03-30 ASSESSMENT — PAIN SCALES - GENERAL
PAINLEVEL_OUTOF10: 8
PAINLEVEL_OUTOF10: 5
PAINLEVEL_OUTOF10: 8
PAINLEVEL_OUTOF10: 6
PAINLEVEL_OUTOF10: 8

## 2022-03-30 ASSESSMENT — PAIN DESCRIPTION - ORIENTATION: ORIENTATION: MID

## 2022-03-30 ASSESSMENT — PAIN DESCRIPTION - PROGRESSION: CLINICAL_PROGRESSION: NOT CHANGED

## 2022-03-30 ASSESSMENT — PAIN - FUNCTIONAL ASSESSMENT: PAIN_FUNCTIONAL_ASSESSMENT: ACTIVITIES ARE NOT PREVENTED

## 2022-03-30 ASSESSMENT — PAIN DESCRIPTION - PAIN TYPE: TYPE: SURGICAL PAIN

## 2022-03-30 ASSESSMENT — PAIN DESCRIPTION - LOCATION: LOCATION: ABDOMEN

## 2022-03-30 ASSESSMENT — PAIN DESCRIPTION - DESCRIPTORS: DESCRIPTORS: ACHING

## 2022-03-30 ASSESSMENT — PAIN DESCRIPTION - ONSET: ONSET: ON-GOING

## 2022-03-30 NOTE — CARE COORDINATION
Discharge orders on chart. Met with Jamee Self and he is in agreement for discharge to home today. He denies a need for DME or services at discharge. Pt verbalized understanding and gave permission for possible discharge within 4 hours of receiving IMM. 3/30/22, 1:57 PM EDT    Patient goals/plan/ treatment preferences discussed by  and . Patient goals/plan/ treatment preferences reviewed with patient/ family. Patient/ family verbalize understanding of discharge plan and are in agreement with goal/plan/treatment preferences. Understanding was demonstrated using the teach back method. AVS provided by RN at time of discharge, which includes all necessary medical information pertaining to the patients current course of illness, treatment, post-discharge goals of care, and treatment preferences. IMM Letter  IMM Letter given to Patient/Family/Significant other/Guardian/POA/by[de-identified] Copy delivered to patient by mgr. Lino  IMM Letter date given[de-identified] 03/30/22  IMM Letter time given[de-identified] Shantelle Roland

## 2022-03-30 NOTE — PLAN OF CARE
Problem: Pain:  Goal: Pain level will decrease  Description: Pain level will decrease  3/29/2022 2115 by Venkatesh Hernandez RN  Outcome: Ongoing  Note: Patient pain goal is 5/10. Patient reporting pain up to 8. Patient utilizes rest and repositioning for comfort. Pain assessment ongoing. Use of PRN medications also in use       Problem: Falls - Risk of:  Goal: Will remain free from falls  Description: Will remain free from falls  3/29/2022 2115 by Venkatesh Hernandez RN  Outcome: Ongoing  Note: Fall assessment completed. Patient using call light appropriately to call for assistance with ambulation to bathroom. Personal items within reach. Patient is also compliant with use of non-skid slippers. Problem: Skin Integrity:  Goal: Absence of new skin breakdown  Description: Absence of new skin breakdown  3/29/2022 2115 by Venkatesh Hernandez RN  Outcome: Ongoing  Note: Midline surgical incision with staples present. Patient is turning himself and ambulating.       Problem: Discharge Planning:  Goal: Discharged to appropriate level of care  Description: Discharged to appropriate level of care  3/29/2022 2115 by Venkatesh Hernandez RN  Outcome: Ongoing  Note: Plan is to be discharged home with wife       Problem: Nutrition  Goal: Optimal nutrition therapy  3/29/2022 2115 by Venkatesh Hernandez RN  Outcome: Ongoing

## 2022-03-30 NOTE — PROGRESS NOTES
[facility]   Nephrology progress note  3/30/2022, 10:58 AM      Pt Name:    Agustin Castro  MRN:     [id number]     YOB: 1960  Admit Date:    3/16/2022 10:41 AM  Primary Care Physician:  Tello Martinez DO   Room number  8X-83/935-E    Chief Complaint: Nephrology following for CAROLE/CKD    Subjective:  Patient seen and examined  Seen earlier today during rounds  No chest pain  No shortness of breath  Feels better  On IV fluids    Objective:  24HR INTAKE/OUTPUT:      Intake/Output Summary (Last 24 hours) at 3/30/2022 1058  Last data filed at 3/30/2022 0925  Gross per 24 hour   Intake 1715 ml   Output 2400 ml   Net -685 ml     I/O last 3 completed shifts: In: 0560 [P.O.:1715]  Out: 2600 [Urine:2600]  I/O this shift:  In: 240 [P.O.:240]  Out: 300 [Urine:300]  Admission weight: (!) 328 lb (148.8 kg)  Wt Readings from Last 3 Encounters:   03/28/22 (!) 325 lb 13.4 oz (147.8 kg)   03/03/22 (!) 333 lb 6.4 oz (151.2 kg)   02/15/22 (!) 338 lb 9.6 oz (153.6 kg)     Body mass index is 42.99 kg/m².     Physical examination  VITALS:     Vitals:    03/29/22 1930 03/30/22 0355 03/30/22 0742 03/30/22 0758   BP: (!) 158/78 131/67 (!) 145/77    Pulse: 65 58 62    Resp: 20 18 18    Temp: 98.3 °F (36.8 °C) 98 °F (36.7 °C) 98.4 °F (36.9 °C)    TempSrc: Oral Oral Oral    SpO2: 98% 93% 93% 93%   Weight:       Height:         General Appearance: alert and cooperative with exam, appears comfortable, no distress  Mouth/Throat: Oral mucosa moist  Lungs:  on room air  Extremities: No significant lower extremity edema  Abd: soft      Lab Data  CBC:   Recent Labs     03/29/22  0452   WBC 5.3   HGB 8.6*   HCT 29.2*        BMP:  Recent Labs     03/29/22  0452 03/30/22  0514    140   K 3.5 3.5    105   CO2 22* 23   BUN 36* 36*   CREATININE 2.8* 2.5*   GLUCOSE 100 87   CALCIUM 8.7 8.2*   MG 1.9 1.9   PHOS 4.3 4.1     Hepatic: No results for input(s): LABALBU, AST, ALT, ALB, BILITOT, ALKPHOS in the last 72 hours. Meds:  Infusion:    sodium chloride 75 mL/hr at 03/29/22 1433    sodium chloride      dextrose       Meds:    doxycycline hyclate  100 mg Oral 2 times per day    hydrALAZINE  25 mg Oral 3 times per day    naloxegol  12.5 mg Oral QAM    polyethylene glycol  17 g Oral Daily    carvedilol  50 mg Oral BID    [Held by provider] bumetanide  1 mg Oral Daily    tamsulosin  0.4 mg Oral Nightly    amLODIPine  10 mg Oral Daily    famotidine  20 mg Oral BID    lidocaine  1 patch TransDERmal Daily    Or    lidocaine  2 patch TransDERmal Daily    Or    lidocaine  3 patch TransDERmal Daily    heparin (porcine)  5,000 Units SubCUTAneous Q8H    ipratropium-albuterol  1 ampule Inhalation BID    sodium chloride flush  5-40 mL IntraVENous 2 times per day    [Held by provider] amitriptyline  10 mg Oral Nightly    doxepin  50 mg Oral Nightly    budesonide-formoterol  2 puff Inhalation BID    montelukast  10 mg Oral Nightly    levothyroxine  175 mcg Oral Daily    rOPINIRole  1 mg Oral Q24H    oxybutynin  10 mg Oral Daily    rOPINIRole  2 mg Oral Nightly     Meds prn: HYDROmorphone, oxyCODONE-acetaminophen, magnesium sulfate, melatonin, ipratropium-albuterol, sodium chloride flush, sodium chloride, ondansetron **OR** ondansetron, acetaminophen **OR** acetaminophen, potassium chloride **OR** potassium alternative oral replacement **OR** potassium chloride, albuterol sulfate HFA, [Held by provider] cyclobenzaprine, glucagon (rDNA), dextrose, glucose, dextrose bolus (hypoglycemia)       Impression and Plan:  1. CAROLE on CKD 3A. Baseline ~ 1.3  Multiple risk factors for CKD including chronic diastolic heart failure/cardiorenal, volume depletion from diarrhea, diabetic nephropathy and also due to post adaptive FSGS from morbid obesity  Creatinine went up to 2.8 yesterday postoperatively  Started IV fluids, down to 2.5  Hold Bumex for another 2 more days  Advised patient can resume on Friday or Saturday as outpatient  If discharged today then can follow-up with me 2 weeks with BMP in 1 week  If here then will follow him in the morning    2. Volume overload. Hold Bumex due to CAROLE  3. Proteinuria secondary to diabetic nephropathy +/-secondary FSGS (post adaptive from obesity)  4. Abdominal wound/infected hernia mesh. Status post surgery: Lysis of adhesions, explantation of infected mesh  5. Chronic diastolic dysfunction with volume overload  6. Chronically uncontrolled diabetes mellitus  7. Essential hypertension.   On Coreg, Norvasc and hydralazine    D/W patient     Yesenia Meza MD  [facility]

## 2022-03-30 NOTE — PROGRESS NOTES
Discharge instructions gone over with patient and spouse, including follow up appointments,  meds called into pharmacy,  Post op instructions gone over with patient,  He voiced understanding,  No concerns noted at this time

## 2022-03-30 NOTE — DISCHARGE SUMMARY
Hospital Medicine Discharge Summary      Patient Identification:  Name: Joao Lyles  : 1960  MRN: [id number]  Account: [de-identified]    Patient's PCP: Baron Juve DO    Admit Date: 3/16/2022    Discharge Date:   3/30/22    Admitting Physician: Viktor Jaramillo MD    Discharge Physician: Aung Arroyo DO, MBA        HPI:  Joao Lyles is a 63 y.o. male with PMHx of HTN, CHF, BPH, LAURIE, hypothyroidism, chronic back pain, CKD, and NIDDM2 who was admitted to [address] [place] [address] on 3/16/2022 for complaint of fever that began approximately 5 days prior to presentation that was self-reported as high as 101F. The patient had associated symptoms of diaphoresis. He noted that he also has increased cough and shortness of breath from his baseline during these complaints. Please see chart for more details regarding HPI. Hospital Course:   Joao Lyles is a 63 y.o. male who presented to [address] [place] [address] for the above referenced complaints. The patient was found to be septic on admission secondary to infected abdominal mesh. Empiric Cefazlolin and Linezolid was initiated at admission. Cultures of the abdominal wound demonstrated MSSA and corynebacterium. Linezolid was discontinue and the patient was continued on Cefazolin for a total of 7 days. General surgery was consulted and the patient underwent explantation of abdominal mesh and lysis of bowel loop adhesions on  without complications. The patient was discharge on 7 days of Doxycycline as recommended by General Surgery. During his hospital course the patient sustained worsening CAROLE secondary to diuresis from fluid overload. The patient was consulted by nephrology who managed the patient throughout the hospital course. The patient was also seen by the CHF clinic and underwent CardioMEMS download to help assist with fluid management. The patient was near his baseline creatinine value upon discharge.  He was instructed to obtain ILIR 1-week after discharge and follow-up outpatient with nephrology clinic. The patient was stable for discharge - all consultants were contacted and in agreement with plan for discharge. Appropriate follow up appointment was arranged prior to discharge. Please see below or view chart for more details from hospital course. Discharge Diagnoses:    · Infection of abdominal mesh  · CAROLE  · CHF  · HTN      The patient was seen and examined on day of discharge and this discharge summary is in conjunction with any daily progress note from day of discharge. The patient understood, was in agreement, and verbalized the plan of care at time of discharge. Exam:    Vitals:   Vitals:    03/29/22 1930 03/30/22 0355 03/30/22 0742 03/30/22 0758   BP: (!) 158/78 131/67 (!) 145/77    Pulse: 65 58 62    Resp: 20 18 18    Temp: 98.3 °F (36.8 °C) 98 °F (36.7 °C) 98.4 °F (36.9 °C)    TempSrc: Oral Oral Oral    SpO2: 98% 93% 93% 93%   Weight:       Height:         Weight: Weight: (!) 325 lb 13.4 oz (147.8 kg)    24 hour intake/output:    Intake/Output Summary (Last 24 hours) at 3/30/2022 1335  Last data filed at 3/30/2022 0925  Gross per 24 hour   Intake 1895 ml   Output 1700 ml   Net 195 ml         Physical Exam  Vitals and nursing note reviewed. Constitutional:       General: He is awake. He is not in acute distress. Appearance: Normal appearance. He is morbidly obese. He is not ill-appearing. HENT:      Head: Normocephalic and atraumatic. Right Ear: External ear normal.      Left Ear: External ear normal.      Nose: Nose normal.      Mouth/Throat:      Mouth: Mucous membranes are moist.      Pharynx: Oropharynx is clear. Eyes:      Conjunctiva/sclera: Conjunctivae normal.      Pupils: Pupils are equal, round, and reactive to light. Cardiovascular:      Rate and Rhythm: Normal rate and regular rhythm. Pulses: Normal pulses.            Dorsalis pedis pulses are 2+ on the right side and 2+ on the left side.        Posterior tibial pulses are 2+ on the right side and 2+ on the left side. Heart sounds: Normal heart sounds. Pulmonary:      Effort: Pulmonary effort is normal.      Breath sounds: Normal breath sounds. No decreased air movement. No decreased breath sounds, wheezing, rhonchi or rales. Abdominal:      General: A surgical scar is present. Bowel sounds are normal.      Palpations: Abdomen is soft. Tenderness: There is no abdominal tenderness. Musculoskeletal:         General: Normal range of motion. Cervical back: Normal range of motion and neck supple. Right lower leg: No edema. Left lower leg: No edema. Skin:     General: Skin is warm and dry. Capillary Refill: Capillary refill takes less than 2 seconds. Neurological:      General: No focal deficit present. Mental Status: He is alert and oriented to person, place, and time. Mental status is at baseline. GCS: GCS eye subscore is 4. GCS verbal subscore is 5. GCS motor subscore is 6. Cranial Nerves: Cranial nerves are intact. Motor: Motor function is intact. Coordination: Coordination is intact. Psychiatric:         Attention and Perception: Attention and perception normal.         Mood and Affect: Mood and affect normal.         Speech: Speech normal.         Behavior: Behavior normal. Behavior is cooperative. Thought Content: Thought content normal.         Cognition and Memory: Cognition and memory normal.         Judgment: Judgment normal.             Labs:  For convenience and continuity at follow-up the following most recent labs are provided:      CBC:    Lab Results   Component Value Date    WBC 5.3 03/29/2022    HGB 8.6 03/29/2022    HCT 29.2 03/29/2022     03/29/2022       Renal:    Lab Results   Component Value Date     03/30/2022    K 3.5 03/30/2022    K 3.6 03/22/2022     03/30/2022    CO2 23 03/30/2022    BUN 36 03/30/2022    CREATININE 2.5 03/30/2022    CALCIUM 8.2 03/30/2022    PHOS 4.1 03/30/2022         Significant Diagnostic Studies    Radiology:   CT ABDOMEN PELVIS WO CONTRAST Additional Contrast? None   Final Result   Gas and fluid external to the abdomen and near the umbilicus along the patient's hernia mesh level. No evidence of intra-abdominal abscess. No acute abdominal or pelvic abnormalities. Multiple chronic findings. Stable appearance of the lung bases. **This report has been created using voice recognition software. It may contain minor errors which are inherent in voice recognition technology. **      Final report electronically signed by Dr. Bernard Navas on 3/19/2022 6:32 PM      XR CHEST PORTABLE   Final Result   Impression:   Cardiomegaly, no CHF. Small right pleural effusion. This document has been electronically signed by: Charito Lipscomb MD on    03/18/2022 07:27 PM      US RENAL COMPLETE   Final Result       1. Bilateral renal cysts. 2. No definite evidence of hydronephrosis on either side. 3. The bladder was not visualized. .               **This report has been created using voice recognition software. It may contain minor errors which are inherent in voice recognition technology. **      Final report electronically signed by DR Latesha Rockwell on 3/18/2022 11:30 AM      NM LUNG VENT/PERFUSION (VQ)   Final Result   Low probability of pulmonary embolus. Normal ventilation bilaterally with good washout and no areas of trapping. This document has been electronically signed by: Andie Frances MD on    03/16/2022 05:52 PM      CT CHEST WO CONTRAST   Final Result       1. Borderline cardiomegaly. Calcification in the left anterior descending and right coronary arteries. 2. 13 mm metallic density in the left lower lobe pulmonary artery, new since previous CT scan of the chest dated 24th of May 2021. 3. Small right pleural effusion. 4. Small mediastinal lymph nodes.    5. Thickening off the interstitial lung markings. 6. Thoracic spondylosis. 7. Splenomegaly. 8. Small hiatal hernia. **This report has been created using voice recognition software. It may contain minor errors which are inherent in voice recognition technology. **      Final report electronically signed by DR Dennise Strauss on 3/16/2022 12:47 PM      XR CHEST PORTABLE   Final Result   1. Mild cardiomegaly and increased pulmonary vascularity. 2. Otherwise negative chest x-ray. .               **This report has been created using voice recognition software. It may contain minor errors which are inherent in voice recognition technology. **      Final report electronically signed by DR Dennise Strauss on 3/16/2022 11:53 AM            Consults:     STR ED TO IP CONSULT  IP CONSULT TO NEPHROLOGY  IP CONSULT TO CARDIOLOGY  IP CONSULT TO DIETITIAN    Disposition:    [x] Home        [] TCU       [] Rehab       [] Psych       [] SNF       [] [place]       [] Other-    Condition at Discharge: stable    Code Status:  Full Code     Patient Instructions:  Discharge lab work: BMP in 1 week  Activity: activity as tolerated and follow discharge instructions from surgery  Diet: [address]; Breakfast, Dinner; Wound Healing Oral Supplement  ADULT DIET; Regular; 4 carb choices (60 gm/meal); Low Sodium (2 gm)    Follow-up Visits:  SAHIL Cody - CNP  [place] [address]  [phone]    On 4/11/2022  1:00    Ernesto Waters MD  [address] [facility]  [phone]    In 2 weeks  CKD        Discharge Medications:        Medication List      START taking these medications    amLODIPine 10 MG tablet  Commonly known as: NORVASC  Take 1 tablet by mouth daily  Start taking on: March 31, 2022     doxycycline hyclate 100 MG tablet  Commonly known as: VIBRA-TABS  Take 1 tablet by mouth every 12 hours for 7 days     ipratropium-albuterol 0.5-2.5 (3) MG/3ML Soln nebulizer solution  Commonly known as: DUONEB  Inhale 3 mLs into the lungs every 4 hours as needed for Shortness of Breath     melatonin 3 MG Tabs tablet  Take 1.5 tablets by mouth nightly as needed (Sleep)        CHANGE how you take these medications    bumetanide 1 MG tablet  Commonly known as: BUMEX  Take 1 tablet by mouth daily AND 1 tablet as needed. Start taking on: April 2, 2022  What changed: These instructions start on April 2, 2022. If you are unsure what to do until then, ask your doctor or other care provider. carvedilol 25 MG tablet  Commonly known as: Coreg  Take 2 tablets by mouth 2 times daily  What changed: how much to take     hydrALAZINE 25 MG tablet  Commonly known as: APRESOLINE  Take 1 tablet by mouth every 8 hours  What changed:   · medication strength  · how much to take     levothyroxine 175 MCG tablet  Commonly known as: SYNTHROID  Take 1 tablet by mouth Daily  Start taking on: March 31, 2022  What changed:   · medication strength  · how much to take        CONTINUE taking these medications    albuterol sulfate  (90 Base) MCG/ACT inhaler  Inhale 2 puffs into the lungs every 6 hours as needed for Wheezing or Shortness of Breath     aspirin EC 81 MG EC tablet     azelastine 0.1 % nasal spray  Commonly known as: ASTELIN     Breo Ellipta 200-25 MCG/INH Aepb inhaler  Generic drug: Fluticasone furoate-vilanterol  Inhale 1 puff into the lungs daily     CPAP Machine Misc  by Does not apply route Please change bipap 17/13 cm H20.     doxepin 25 MG capsule  Commonly known as: SINEQUAN  Take 2 capsules by mouth nightly     gabapentin 300 MG capsule  Commonly known as: NEURONTIN  Take 1 capsule by mouth nightly for 30 days.      glimepiride 4 MG tablet  Commonly known as: AMARYL     ibuprofen 200 MG tablet  Commonly known as: ADVIL;MOTRIN     montelukast 10 MG tablet  Commonly known as: SINGULAIR  Take 1 tablet by mouth nightly     nitroGLYCERIN 0.4 MG SL tablet  Commonly known as: Nitrostat  Place 1 tablet under the tongue every 5 minutes as needed for Chest pain (up to 3 doses)     oxybutynin 10 MG extended release tablet  Commonly known as: Ditropan XL  Take 1 tablet by mouth daily     oxyCODONE-acetaminophen  MG per tablet  Commonly known as: Percocet  Take 1 tablet by mouth every 8 hours as needed for Pain for up to 30 days. Intended supply: 30 days     rOPINIRole 1 MG tablet  Commonly known as: REQUIP  Take 1 pill in afternoon and 2 at bedtime     tamsulosin 0.4 MG capsule  Commonly known as: Flomax  Take 1 capsule by mouth nightly        STOP taking these medications    acetaminophen 500 MG tablet  Commonly known as: TYLENOL     amitriptyline 10 MG tablet  Commonly known as: ELAVIL     bismuth subsalicylate 585 ZZ/73PG suspension  Commonly known as: PEPTO BISMOL     cyclobenzaprine 10 MG tablet  Commonly known as: FLEXERIL     Januvia 100 MG tablet  Generic drug: SITagliptin     sacubitril-valsartan 49-51 MG per tablet  Commonly known as: ENTRESTO     spironolactone 50 MG tablet  Commonly known as: ALDACTONE           Where to Get Your Medications      These medications were sent to [address] [place] , [address]  [address] [place] [address] [address], [address] [address]    Phone: [phone]   · amLODIPine 10 MG tablet  · bumetanide 1 MG tablet  · carvedilol 25 MG tablet  · doxycycline hyclate 100 MG tablet  · hydrALAZINE 25 MG tablet  · levothyroxine 175 MCG tablet  · melatonin 3 MG Tabs tablet     These medications were sent to [address] [place], [place] [address] [address]  [address]    Phone: [phone]   · ipratropium-albuterol 0.5-2.5 (3) MG/3ML Soln nebulizer solution               Discharge Time:  Time spent on discharge is >35 minutes in the examination, evaluation, counseling, and review of medications and discharge plan.  The hospital course was discussed with the patient and all questions and concerns were addressed at that time. The patient was in agreement with and verbalized understanding of the plan of care and had no additional questions or complaints. The patient was instructed to follow-up with any scheduled outpatient appointments or to report to the nearest Emergency Department if new or worsening symptoms should arise. Thank you Ayde Woodruff DO for the opportunity to be involved in this patient's care. Signed:    Electronically signed by Gabo Vargas DO, MBA on 3/30/2022 at 1:35 PM

## 2022-03-30 NOTE — PROGRESS NOTES
Meds:HYDROmorphone, oxyCODONE-acetaminophen, magnesium sulfate, melatonin, ipratropium-albuterol, sodium chloride flush, sodium chloride, ondansetron **OR** ondansetron, acetaminophen **OR** acetaminophen, potassium chloride **OR** potassium alternative oral replacement **OR** potassium chloride, albuterol sulfate HFA, [Held by provider] cyclobenzaprine, glucagon (rDNA), dextrose, glucose, dextrose bolus (hypoglycemia)    Objective:    CBC:   Recent Labs     03/29/22 0452   WBC 5.3   HGB 8.6*        BMP:    Recent Labs     03/29/22 0452 03/30/22  0514    140   K 3.5 3.5    105   CO2 22* 23   BUN 36* 36*   CREATININE 2.8* 2.5*   GLUCOSE 100 87     Calcium:  Recent Labs     03/30/22 0514   CALCIUM 8.2*     Ionized Calcium:No results for input(s): IONCA in the last 72 hours. Magnesium:  Recent Labs     03/30/22  0514   MG 1.9     Phosphorus:  Recent Labs     03/30/22  0514   PHOS 4.1     BNP:No results for input(s): BNP in the last 72 hours. Glucose:  No results for input(s): POCGLU in the last 72 hours. HgbA1C: No results for input(s): LABA1C in the last 72 hours. INR: No results for input(s): INR in the last 72 hours. Hepatic:   No results for input(s): ALKPHOS, ALT, AST, PROT, BILITOT, BILIDIR, LABALBU in the last 72 hours. Amylase and Lipase:No results for input(s): LACTA, AMYLASE in the last 72 hours. Lactic Acid: No results for input(s): LACTA in the last 72 hours. Troponin:   No results for input(s): CKTOTAL, CKMB, TROPONINT in the last 72 hours. BNP: No results for input(s): BNP in the last 72 hours. Lipids: No results for input(s): CHOL, TRIG, HDL, LDL, LDLCALC in the last 72 hours.   ABGs:   Lab Results   Component Value Date    PH 7.42 03/02/2021    PCO2 41 03/02/2021    PO2 69 03/02/2021    HCO3 27 03/02/2021    O2SAT 94 03/02/2021       Radiology reports as per the Radiologist  Radiology: CT CHEST WO CONTRAST    Result Date: 3/16/2022  PROCEDURE: CT CHEST WO CONTRAST CLINICAL INFORMATION: cough. COMPARISON: Chest x-ray obtained on the same day. CT scan dated 24th of May 2021. . TECHNIQUE: 5 mm noncontrast axial images were obtained through the chest. Sagittal and coronal reconstructions were obtained. All CT scans at this facility use dose modulation, iterative reconstruction, and/or weight-based dosing when appropriate to reduce radiation dose to as low as reasonably achievable. FINDINGS: There is borderline cardiomegaly. There is calcification in the left anterior descending and right coronary arteries. . The aorta is of normal caliber. There is a 13 mm metallic density in the left lower lobe pulmonary artery. This appears new since previous CT pulmonary angiogram dated 24th of May 2021. Niru Gin There is small mediastinal lymph nodes present. There is no pericardial effusion. There is a small right pleural effusion There is thickening of the interstitial lung markings. There appears to be slightly increased fluid along the fissures. There is thoracic spondylosis. . Is a small hiatal hernia There is splenomegaly. 1. Borderline cardiomegaly. Calcification in the left anterior descending and right coronary arteries. 2. 13 mm metallic density in the left lower lobe pulmonary artery, new since previous CT scan of the chest dated 24th of May 2021. 3. Small right pleural effusion. 4. Small mediastinal lymph nodes. 5. Thickening off the interstitial lung markings. 6. Thoracic spondylosis. 7. Splenomegaly. 8. Small hiatal hernia. **This report has been created using voice recognition software. It may contain minor errors which are inherent in voice recognition technology. ** Final report electronically signed by DR Latesha Rockwell on 3/16/2022 12:47 PM    NM LUNG VENT/PERFUSION (VQ)    Result Date: 3/16/2022  NM Lung Perfusion/ventilation Comparison: None Findings 3.10 mCi technetium 99m MAA used. 6.80 MCI xenon-133 gas used. Wash-in equilibrium and washout images show normal xenon-133 ventilation scan. The lungs are almost completely clear of xenon within 2 minutes. Perfusion lung images show no defects. Low probability of pulmonary embolus. Normal ventilation bilaterally with good washout and no areas of trapping. This document has been electronically signed by: Francheska Aldana MD on 03/16/2022 05:52 PM    XR CHEST PORTABLE    Result Date: 3/16/2022  PROCEDURE: XR CHEST PORTABLE CLINICAL INFORMATION: fever. COMPARISON: Chest x-ray dated third of March 2022. TECHNIQUE: AP upright view of the chest. FINDINGS: There is mild cardiomegaly. .The mediastinum is not widened. There are no pulmonary infiltrates or effusions. The pulmonary vascularity is increased. There is thoracic spondylosis. 1. Mild cardiomegaly and increased pulmonary vascularity. 2. Otherwise negative chest x-ray. . **This report has been created using voice recognition software. It may contain minor errors which are inherent in voice recognition technology. ** Final report electronically signed by DR Chiara Farmer on 3/16/2022 11:53 AM       Physical Exam:  Vitals: BP (!) 145/77   Pulse 62   Temp 98.4 °F (36.9 °C) (Oral)   Resp 18   Ht 6' 1\" (1.854 m)   Wt (!) 325 lb 13.4 oz (147.8 kg)   SpO2 93%   BMI 42.99 kg/m²   24 hour intake/output:    Intake/Output Summary (Last 24 hours) at 3/30/2022 1105  Last data filed at 3/30/2022 0925  Gross per 24 hour   Intake 1715 ml   Output 2400 ml   Net -685 ml     Last 3 weights: Wt Readings from Last 3 Encounters:   03/28/22 (!) 325 lb 13.4 oz (147.8 kg)   03/03/22 (!) 333 lb 6.4 oz (151.2 kg)   02/15/22 (!) 338 lb 9.6 oz (153.6 kg)       General appearance - oriented to person, place, and time and overweight  HEENT: Normocephalic and Atraumatic  Chest - clear to auscultation, no wheezes, rales or rhonchi, symmetric air entry  Cardiovascular - normal rate and regular rhythm  Abdomen - BS active.  Soft, round  Surgical Site: incision is well approximated, staples intact, no drainage no erythema Neurological - Alert and oriented and Normal speech  Integumentary - Skin color, texture, turgor normal. No Rashes or lesions  Musculoskeletal -Full ROM times 4 extremities      DVT prophylaxis: [] Lovenox                                 [] SCDs                                 [x] SQ Heparin                                 [] Encourage ambulation           [] Already on Anticoagulation           X Aspirin     Assessment:  1. S/p removal of portion of infected mesh, lysis of significant adhesions POD# 7  2. Postoperative pain as expected   3. CAROLE on CKD  4. CHF- HTN   5. COPD  6. DM  7. LAURIE  8. Chronic back pain - percocet   9. 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 *      Plan:    1. soft diet  2. Analgesia and antiemetics as needed adjusted home percocet   3. Antibiotic therapy  Oral doxy x 1 week   4. Monitor Labs, lytes per protocol  5. medicine management   6. Nephrology and cardiology following   7. Sub q heparin prophylaxis   8. GI prophylaxis   9. Discharge okay from a general surgery standpoint, follow appt scheduled      Electronically signed by SAHIL Davis CNP on 3/30/2022 at 11:05 AM Patient seen and examined independently by me. Above discussed and I agree with CNP. Labs, cultures, and radiographs where available were reviewed. See orders for the updated patient care plan.     Beryl Kraft MD MD, patient's abdomen is soft wound looks good is tolerating diet okay for discharge from general surgery standpoint reviewed nephrology note apparently they are okay for discharge will follow-up in the office  3/30/2022   12:50 PM

## 2022-03-30 NOTE — RT PROTOCOL NOTE
RT Inhaler-Nebulizer Bronchodilator Protocol Note    There is a bronchodilator order in the chart from a provider indicating to follow the RT Bronchodilator Protocol and there is an Initiate RT Inhaler-Nebulizer Bronchodilator Protocol order as well (see protocol at bottom of note). CXR Findings:  No results found. The findings from the last RT Protocol Assessment were as follows:   History Pulmonary Disease: None or smoker <15 pack years  Respiratory Pattern: Dyspnea on exertion or RR 21-25 bpm  Breath Sounds: Slightly diminished and/or crackles  Cough: Strong, spontaneous, non-productive  Indication for Bronchodilator Therapy: Decreased or absent breath sounds,On home bronchodilators  Bronchodilator Assessment Score: 4    Aerosolized bronchodilator medication orders have been revised according to the RT Inhaler-Nebulizer Bronchodilator Protocol below. Respiratory Therapist to perform RT Therapy Protocol Assessment initially then follow the protocol. Repeat RT Therapy Protocol Assessment PRN for score 0-3 or on second treatment, BID, and PRN for scores above 3. No Indications - adjust the frequency to every 6 hours PRN wheezing or bronchospasm, if no treatments needed after 48 hours then discontinue using Per Protocol order mode. If indication present, adjust the RT bronchodilator orders based on the Bronchodilator Assessment Score as indicated below. Use Inhaler orders unless patient has one or more of the following: on home nebulizer, not able to hold breath for 10 seconds, is not alert and oriented, cannot activate and use MDI correctly, or respiratory rate 25 breaths per minute or more, then use the equivalent nebulizer order(s) with same Frequency and PRN reasons based on the score. If a patient is on this medication at home then do not decrease Frequency below that used at home.     0-3 - enter or revise RT bronchodilator order(s) to equivalent RT Bronchodilator order with Frequency of every 4 hours PRN for wheezing or increased work of breathing using Per Protocol order mode. 4-6 - enter or revise RT Bronchodilator order(s) to two equivalent RT bronchodilator orders with one order with BID Frequency and one order with Frequency of every 4 hours PRN wheezing or increased work of breathing using Per Protocol order mode. 7-10 - enter or revise RT Bronchodilator order(s) to two equivalent RT bronchodilator orders with one order with TID Frequency and one order with Frequency of every 4 hours PRN wheezing or increased work of breathing using Per Protocol order mode. 11-13 - enter or revise RT Bronchodilator order(s) to one equivalent RT bronchodilator order with QID Frequency and an Albuterol order with Frequency of every 4 hours PRN wheezing or increased work of breathing using Per Protocol order mode. Greater than 13 - enter or revise RT Bronchodilator order(s) to one equivalent RT bronchodilator order with every 4 hours Frequency and an Albuterol order with Frequency of every 2 hours PRN wheezing or increased work of breathing using Per Protocol order mode. RT to enter RT Home Evaluation for COPD & MDI Assessment order using Per Protocol order mode.     Electronically signed by Carolina Head RCP on 3/30/2022 at 8:03 AM

## 2022-03-31 ENCOUNTER — PATIENT MESSAGE (OUTPATIENT)
Dept: CARDIOLOGY CLINIC | Age: 62
End: 2022-03-31

## 2022-03-31 NOTE — TELEPHONE ENCOUNTER
Patient was in the hospital having surgery during last appointment. Got his appointment rescheduled.

## 2022-03-31 NOTE — TELEPHONE ENCOUNTER
From: Madalyn Myles  To: Vinay Mallory  Sent: 3/31/2022 8:44 AM EDT  Subject: Dewey Hanson morning Rosalie   I got to come home late yesterday evening and I did a download this morning so the girls won't have to go out on the floor take care and thanks to you and your team and all you do for me Eddie Eisenberg

## 2022-03-31 NOTE — TELEPHONE ENCOUNTER
Called and spoke to Artuhr Ennis - just sent CardioMEMs - PAD 37mmHg. Up from 29 yesterday. He states he was given 2 bags IV fluid yesterday, trying to recoop some kidney function. Is suppose to hold Bumex till Saturday. He denies increased SOB, no swelling. Just tired and deconditioned. Pt will send another reading this evening and first thing tomorrow. Will keep close eye may need to resume Bumex sooner.

## 2022-04-06 DIAGNOSIS — R35.0 BENIGN PROSTATIC HYPERPLASIA WITH URINARY FREQUENCY: Primary | ICD-10-CM

## 2022-04-06 DIAGNOSIS — R97.20 RISING PSA LEVEL: ICD-10-CM

## 2022-04-06 DIAGNOSIS — N40.1 BENIGN PROSTATIC HYPERPLASIA WITH URINARY FREQUENCY: Primary | ICD-10-CM

## 2022-04-07 ENCOUNTER — HOSPITAL ENCOUNTER (OUTPATIENT)
Age: 62
Discharge: HOME | End: 2022-04-07
Payer: MEDICARE

## 2022-04-07 DIAGNOSIS — R97.20 RISING PSA LEVEL: ICD-10-CM

## 2022-04-07 DIAGNOSIS — I50.9 CHF (NYHA CLASS III, ACC/AHA STAGE C) (HCC): ICD-10-CM

## 2022-04-07 DIAGNOSIS — N17.9 AKI (ACUTE KIDNEY INJURY) (HCC): ICD-10-CM

## 2022-04-07 DIAGNOSIS — N40.1 BENIGN PROSTATIC HYPERPLASIA WITH URINARY FREQUENCY: ICD-10-CM

## 2022-04-07 DIAGNOSIS — R35.0 BENIGN PROSTATIC HYPERPLASIA WITH URINARY FREQUENCY: ICD-10-CM

## 2022-04-07 PROCEDURE — 36415 COLL VENOUS BLD VENIPUNCTURE: CPT

## 2022-04-07 PROCEDURE — 80048 BASIC METABOLIC PNL TOTAL CA: CPT

## 2022-04-07 PROCEDURE — 84153 ASSAY OF PSA TOTAL: CPT

## 2022-04-08 ENCOUNTER — TELEPHONE (OUTPATIENT)
Dept: CARDIOLOGY CLINIC | Age: 62
End: 2022-04-08

## 2022-04-08 LAB
ANION GAP SERPL CALCULATED.3IONS-SCNC: 15 MEQ/L (ref 8–16)
BUN BLDV-MCNC: 24 MG/DL (ref 7–22)
CALCIUM SERPL-MCNC: 8.3 MG/DL (ref 8.5–10.5)
CHLORIDE BLD-SCNC: 104 MEQ/L (ref 98–111)
CO2: 24 MEQ/L (ref 23–33)
CREAT SERPL-MCNC: 2.1 MG/DL (ref 0.4–1.2)
GFR SERPL CREATININE-BSD FRML MDRD: 32 ML/MIN/1.73M2
GLUCOSE BLD-MCNC: 136 MG/DL (ref 70–108)
POTASSIUM SERPL-SCNC: 3.5 MEQ/L (ref 3.5–5.2)
PROSTATE SPECIFIC ANTIGEN: 1.24 NG/ML (ref 0–1)
SODIUM BLD-SCNC: 143 MEQ/L (ref 135–145)

## 2022-04-08 NOTE — TELEPHONE ENCOUNTER
Spoke to patient. Still SOB, no swelling. Weight average 319 lb, thinks this AM was 322-323 lb.  Back on  Bumex 1mg/day

## 2022-04-08 NOTE — TELEPHONE ENCOUNTER
Kidney function relatively same after holding Bumex 3 days post d/c.    CardioMEMs has been elevated at PAD 34-37 mmHg - today 35. Was to resume Bumex 1 mg on Satuday 4/2- PAD relatively not improved since   Prior to recent bump creat and admission PAD was averaging 25-29 mmHg. Rise in PAD also related to Anemia - last Hgb 8.6 (prior baseline was 12-13). Call and check on pt - SOB, swelling, wt gain? Ensure back on Bumex daily.

## 2022-04-12 ENCOUNTER — OFFICE VISIT (OUTPATIENT)
Dept: UROLOGY | Age: 62
End: 2022-04-12
Payer: MEDICARE

## 2022-04-12 VITALS — HEIGHT: 73 IN | BODY MASS INDEX: 41.75 KG/M2 | WEIGHT: 315 LBS | RESPIRATION RATE: 22 BRPM

## 2022-04-12 DIAGNOSIS — Z12.5 PROSTATE CANCER SCREENING: ICD-10-CM

## 2022-04-12 DIAGNOSIS — N40.1 BENIGN PROSTATIC HYPERPLASIA WITH URINARY FREQUENCY: Primary | ICD-10-CM

## 2022-04-12 DIAGNOSIS — R35.0 BENIGN PROSTATIC HYPERPLASIA WITH URINARY FREQUENCY: Primary | ICD-10-CM

## 2022-04-12 DIAGNOSIS — R97.20 RISING PSA LEVEL: ICD-10-CM

## 2022-04-12 DIAGNOSIS — N50.812 LEFT TESTICULAR PAIN: ICD-10-CM

## 2022-04-12 PROCEDURE — 4004F PT TOBACCO SCREEN RCVD TLK: CPT | Performed by: UROLOGY

## 2022-04-12 PROCEDURE — 1111F DSCHRG MED/CURRENT MED MERGE: CPT | Performed by: UROLOGY

## 2022-04-12 PROCEDURE — G8427 DOCREV CUR MEDS BY ELIG CLIN: HCPCS | Performed by: UROLOGY

## 2022-04-12 PROCEDURE — 3017F COLORECTAL CA SCREEN DOC REV: CPT | Performed by: UROLOGY

## 2022-04-12 PROCEDURE — G8417 CALC BMI ABV UP PARAM F/U: HCPCS | Performed by: UROLOGY

## 2022-04-12 PROCEDURE — 99214 OFFICE O/P EST MOD 30 MIN: CPT | Performed by: UROLOGY

## 2022-04-12 RX ORDER — OXYBUTYNIN CHLORIDE 10 MG/1
10 TABLET, EXTENDED RELEASE ORAL DAILY
Qty: 90 TABLET | Refills: 3 | Status: ON HOLD | OUTPATIENT
Start: 2022-04-12 | End: 2022-04-24 | Stop reason: HOSPADM

## 2022-04-12 RX ORDER — TAMSULOSIN HYDROCHLORIDE 0.4 MG/1
0.4 CAPSULE ORAL NIGHTLY
Qty: 90 CAPSULE | Refills: 3 | Status: ON HOLD | OUTPATIENT
Start: 2022-04-12 | End: 2022-08-17 | Stop reason: HOSPADM

## 2022-04-12 NOTE — PROGRESS NOTES
[address], [place]        [address] [place] [id number]  Dept: [phone]  Dept Fax: [phone]: [address] [address] Urology Office Note -     Patient:  Jorge Kumari  YOB: 1960    The patient is a 63 y.o. male who presents today for evaluation of the following problems:   Chief Complaint   Patient presents with    1 Year Follow Up     PSA prior     Benign Prostatic Hypertrophy     on flomax and ditropan. HISTORY OF PRESENT ILLNESS:     BPH- On flomax/ditropan stable. AUA- severe symptoms. Briefly discussed prostate outlet surgeries in past .     Left testicular pain- resolved    Elevated PSA- psa stable      Had recent surgery for infected mesh        Summary of Previous Records:    Jorge Kumari f/u today for BPH, OAB. He is benitez okay, reports he has not been taking Flomax or Ditropan, has been out of medication. He reports weak urinary stream with dribbling, denies nocturia incomplete bladder emptying, frequency. His incontinence has improved. He is having a really ahrd time with GI problems, has chronic diarrhea with stool incontinence. Requested/reviewed records from Lele Castillo DO office and/or outside physician/EMR    (Patient's old records have been requested, reviewed and pertinent findings summarized in today's note.)    Procedures Today: N/A    Last several PSA's:  Lab Results   Component Value Date    PSA 1.24 (H) 04/07/2022    PSA 0.82 04/15/2021    PSA 1.06 (H) 09/26/2020       Last total testosterone:  No results found for: TESTOSTERONE    Urinalysis today:  No results found for this visit on 04/12/22.     Last BUN and creatinine:  Lab Results   Component Value Date    BUN 24 (H) 04/07/2022     Lab Results   Component Value Date    CREATININE 2.1 (H) 04/07/2022       Imaging Reviewed during this Office Visit:   [address], [place] independently reviewed carvedilol (COREG) 25 MG tablet Take 2 tablets by mouth 2 times daily 60 tablet 3    hydrALAZINE (APRESOLINE) 25 MG tablet Take 1 tablet by mouth every 8 hours 90 tablet 3    levothyroxine (SYNTHROID) 175 MCG tablet Take 1 tablet by mouth Daily 30 tablet 3    amLODIPine (NORVASC) 10 MG tablet Take 1 tablet by mouth daily 30 tablet 3    melatonin 3 MG TABS tablet Take 1.5 tablets by mouth nightly as needed (Sleep) 45 tablet 3    ipratropium-albuterol (DUONEB) 0.5-2.5 (3) MG/3ML SOLN nebulizer solution Inhale 3 mLs into the lungs every 4 hours as needed for Shortness of Breath 360 mL 1    Fluticasone furoate-vilanterol (BREO ELLIPTA) 200-25 MCG/INH AEPB inhaler Inhale 1 puff into the lungs daily 3 each 3    montelukast (SINGULAIR) 10 MG tablet Take 1 tablet by mouth nightly 90 tablet 3    albuterol sulfate  (90 Base) MCG/ACT inhaler Inhale 2 puffs into the lungs every 6 hours as needed for Wheezing or Shortness of Breath 1 each 5    CPAP Machine MISC by Does not apply route Please change bipap 17/13 cm H20. 1 each 0    rOPINIRole (REQUIP) 1 MG tablet Take 1 pill in afternoon and 2 at bedtime 270 tablet 1    doxepin (SINEQUAN) 25 MG capsule Take 2 capsules by mouth nightly 60 capsule 3    ibuprofen (ADVIL;MOTRIN) 200 MG tablet Take 400 mg by mouth every 6 hours as needed for Pain      aspirin EC 81 MG EC tablet Take 1 tablet by mouth daily 90 tablet 1    nitroGLYCERIN (NITROSTAT) 0.4 MG SL tablet Place 1 tablet under the tongue every 5 minutes as needed for Chest pain (up to 3 doses) 25 tablet 3    glimepiride (AMARYL) 4 MG tablet Take 8 mg by mouth daily       azelastine (ASTELIN) 137 MCG/SPRAY nasal spray 1 spray by Nasal route as needed.  Use in each nostril as directed         Shellfish-derived products, Pcn [penicillins], Succinylcholine, Sulfa antibiotics, Morphine, and Tape [adhesive tape]  Social History     Tobacco Use   Smoking Status Former Smoker    Types: Pipe   Smokeless Tobacco Current User    Types: Chew   Tobacco Comment    quit pipe over 30 yrs ago      (If patient a smoker, smoking cessation counseling offered)   Social History     Substance and Sexual Activity   Alcohol Use No    Alcohol/week: 0.0 standard drinks    Comment: quit       REVIEW OF SYSTEMS:  Constitutional: negative  Eyes: negative  Respiratory: negative  Cardiovascular: negative  Gastrointestinal: negative  Genitourinary: see HPI  Musculoskeletal: negative  Skin: negative   Neurological: negative  Hematological/Lymphatic: negative  Psychological: negative      Physical Exam:    This a 63 y.o. male  Vitals:    04/12/22 1304   Resp: 22     Body mass index is 43.01 kg/m². Constitutional: Patient in no acute distress;         Assessment and Plan        1. Benign prostatic hyperplasia with urinary frequency    2. Rising PSA level    3. Left testicular pain    4. Prostate cancer screening               Plan:      BPH- refilled ditropan and flomax. Stable urinary symptoms  Left testicular pain- improved with course of cipro/motrin in past    Had a very difficult hosp course recently  Follow up in 12 months with PSA prior.  PVR on arrival.           Prescriptions Ordered:  Orders Placed This Encounter   Medications    tamsulosin (FLOMAX) 0.4 MG capsule     Sig: Take 1 capsule by mouth nightly     Dispense:  90 capsule     Refill:  3    oxybutynin (DITROPAN XL) 10 MG extended release tablet     Sig: Take 1 tablet by mouth daily     Dispense:  90 tablet     Refill:  3      Orders Placed:  Orders Placed This Encounter   Procedures    PSA, Prostatic Specific Antigen     Standing Status:   Future     Standing Expiration Date:   10/12/2023            Jamey Bowser MD

## 2022-04-13 ENCOUNTER — OFFICE VISIT (OUTPATIENT)
Dept: PHYSICAL MEDICINE AND REHAB | Age: 62
End: 2022-04-13
Payer: MEDICARE

## 2022-04-13 VITALS
BODY MASS INDEX: 41.75 KG/M2 | DIASTOLIC BLOOD PRESSURE: 72 MMHG | HEIGHT: 73 IN | WEIGHT: 315 LBS | SYSTOLIC BLOOD PRESSURE: 130 MMHG

## 2022-04-13 DIAGNOSIS — G89.29 CHRONIC PAIN OF RIGHT KNEE: ICD-10-CM

## 2022-04-13 DIAGNOSIS — G62.9 NEUROPATHY: ICD-10-CM

## 2022-04-13 DIAGNOSIS — M25.561 CHRONIC PAIN OF RIGHT KNEE: ICD-10-CM

## 2022-04-13 DIAGNOSIS — M54.17 LUMBOSACRAL RADICULITIS: ICD-10-CM

## 2022-04-13 DIAGNOSIS — M47.816 LUMBAR FACET ARTHROPATHY: ICD-10-CM

## 2022-04-13 DIAGNOSIS — G89.18 POSTOPERATIVE ABDOMINAL PAIN: ICD-10-CM

## 2022-04-13 DIAGNOSIS — M17.11 PRIMARY OSTEOARTHRITIS OF RIGHT KNEE: ICD-10-CM

## 2022-04-13 DIAGNOSIS — G89.4 CHRONIC PAIN SYNDROME: ICD-10-CM

## 2022-04-13 DIAGNOSIS — M47.816 SPONDYLOSIS OF LUMBAR REGION WITHOUT MYELOPATHY OR RADICULOPATHY: Primary | ICD-10-CM

## 2022-04-13 DIAGNOSIS — M48.062 SPINAL STENOSIS OF LUMBAR REGION WITH NEUROGENIC CLAUDICATION: ICD-10-CM

## 2022-04-13 DIAGNOSIS — F11.90 CHRONIC, CONTINUOUS USE OF OPIOIDS: ICD-10-CM

## 2022-04-13 DIAGNOSIS — R10.9 POSTOPERATIVE ABDOMINAL PAIN: ICD-10-CM

## 2022-04-13 PROCEDURE — G8427 DOCREV CUR MEDS BY ELIG CLIN: HCPCS | Performed by: NURSE PRACTITIONER

## 2022-04-13 PROCEDURE — 4004F PT TOBACCO SCREEN RCVD TLK: CPT | Performed by: NURSE PRACTITIONER

## 2022-04-13 PROCEDURE — G8417 CALC BMI ABV UP PARAM F/U: HCPCS | Performed by: NURSE PRACTITIONER

## 2022-04-13 PROCEDURE — 1111F DSCHRG MED/CURRENT MED MERGE: CPT | Performed by: NURSE PRACTITIONER

## 2022-04-13 PROCEDURE — 99214 OFFICE O/P EST MOD 30 MIN: CPT | Performed by: NURSE PRACTITIONER

## 2022-04-13 PROCEDURE — 3017F COLORECTAL CA SCREEN DOC REV: CPT | Performed by: NURSE PRACTITIONER

## 2022-04-13 RX ORDER — OXYCODONE AND ACETAMINOPHEN 10; 325 MG/1; MG/1
1 TABLET ORAL EVERY 8 HOURS PRN
Qty: 90 TABLET | Refills: 0 | Status: SHIPPED | OUTPATIENT
Start: 2022-04-15 | End: 2022-05-25 | Stop reason: SDUPTHER

## 2022-04-13 RX ORDER — GABAPENTIN 300 MG/1
300 CAPSULE ORAL NIGHTLY
Qty: 30 CAPSULE | Refills: 0 | Status: SHIPPED | OUTPATIENT
Start: 2022-04-13 | End: 2022-05-25 | Stop reason: SDUPTHER

## 2022-04-13 ASSESSMENT — ENCOUNTER SYMPTOMS
SHORTNESS OF BREATH: 1
COUGH: 1
ABDOMINAL PAIN: 1
CHEST TIGHTNESS: 1
STRIDOR: 0
BACK PAIN: 1
WHEEZING: 0

## 2022-04-13 NOTE — PROGRESS NOTES
[address] [facility] [address]  Dept: [phone]  Dept Fax: [phone]: [phone]    Visit Date: 4/13/2022    Functionality Assessment/Goals Worksheet     On a scale of 0 (Does not Interfere) to 10 (Completely Interferes)     1. Which number describes how during the past week pain has interfered with       the following:  A. General Activity:  5  B. Mood: 0  C. Walking Ability:  7  D. Normal Work (Includes both work outside the home and housework):  5  E. Relations with Other People:   6  F. Sleep:   6  G. Enjoyment of Life:   7    2. Patient Prefers to Take their Pain Medications:     [x]  On a regular basis   []  Only when necessary    []  Does not take pain medications    3. What are the Patient's Goals/Expectations for Visiting Pain Management? [x]  Learn about my pain    [x]  Receive Medication   []  Physical Therapy     []  Treat Depression   []  Receive Injections    []  Treat Sleep   []  Deal with Anxiety and Stress   []  Treat Opoid Dependence/Addiction   []  Other:      HPI:   Isaak Valdovinos is a 63 y.o. male is here today for    Chief Complaint: Low back pain, leg pain     HPI   3 month FU. Continues to have pain in lower back radiating across lower back and down into buttocks/ SI area and down right lower extremity posteriorly to feet. Intermittently pain in left leg. Throbbing aching pain in low back and numbness in leg and feet   Pain mainly most bothersome in low back. Continues to have bilateral knee pain   Had recent abdominal surgery 3/25/22 had abdominal lysis of adhesions and took out infected mesh- abdominal incision with staples. Was in the hospital for 2 weeks   Pain increases with bending, lifting, twisting , standing and getting up and down    Current pain medications remain effective.  States that the surgeon told him to double up on his pain medication after discharge but he has back to taking them TID prn. He did not get a prescription at discharge      Medications reviewed. Patient denies side effects with medications. Patient states he is taking medications as prescribed. Hedenies receiving pain medications from other sources. He had 1 ER visist since last visit    Pain scale with out pain medications or at its worst is 8-9/10. Pain scale with pain medications or at its best is 5/10. Last dose of Percocet and neurontin  Drug screen reviewed from 1/17/2022 and was appropriate  Pill count completed  today and WNL: Yes      The patientis allergic to shellfish-derived products, pcn [penicillins], succinylcholine, sulfa antibiotics, morphine, and tape [adhesive tape]. Subjective:      Review of Systems   Constitutional: Positive for fatigue. Negative for fever. Respiratory: Positive for cough, chest tightness and shortness of breath. Negative for wheezing and stridor. Cardiovascular: Positive for leg swelling. Gastrointestinal: Positive for abdominal pain. Incision healing    Musculoskeletal: Positive for arthralgias, back pain, gait problem, joint swelling, myalgias and neck stiffness. Negative for neck pain. Skin: Negative. Neurological: Positive for weakness and numbness. Psychiatric/Behavioral: Negative. Objective:     Vitals:    04/13/22 0813   BP: 130/72   Site: Left Upper Arm   Position: Sitting   Cuff Size: Large Adult   Weight: (!) 326 lb (147.9 kg)   Height: 6' 1\" (1.854 m)       Physical Exam  Constitutional:       General: He is not in acute distress. Appearance: Normal appearance. He is obese. He is not ill-appearing, toxic-appearing or diaphoretic. HENT:      Head: Normocephalic and atraumatic. Right Ear: External ear normal. There is no impacted cerumen. Left Ear: External ear normal. There is no impacted cerumen. Nose: Nose normal. No congestion or rhinorrhea.       Mouth/Throat: and oriented to person, place, and time. Sensory: Sensory deficit present. Motor: Weakness present. Coordination: Romberg sign positive. Coordination abnormal.      Gait: Gait abnormal.      Deep Tendon Reflexes:      Reflex Scores:       Tricep reflexes are 2+ on the right side and 2+ on the left side. Bicep reflexes are 2+ on the right side and 2+ on the left side. Brachioradialis reflexes are 2+ on the right side and 2+ on the left side. Patellar reflexes are 1+ on the right side and 1+ on the left side. Achilles reflexes are 1+ on the right side and 1+ on the left side. Comments: 4/5/strength bilateral lower extremity    Decreased sensation bilateral feet    Psychiatric:         Mood and Affect: Mood normal.         Behavior: Behavior normal.       SAUNDRA  Patricks test  positive  Yeoman's  positive  Gaenslen's  positive        Assessment:     1. 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 Lumbar facet arthropathy    3. Spinal stenosis of lumbar region with neurogenic claudication    4. Lumbosacral radiculitis    5. Chronic pain of right knee    6. Primary osteoarthritis of right knee    7. Neuropathy    8. Postoperative abdominal pain    9. Chronic pain syndrome    10. Chronic, continuous use of opioids            Plan:      · OARRS reviewed. Current MED: 10.00  · Patient was offered naloxone for home. · Discussed long term side effects of medications, tolerance, dependency and addiction. · Previous UDS reviewed  · UDS preformed today for compliance. · Patient told can not receive any pain medications from any other source. · No evidence of abuse, diversion or aberrant behavior.    Medications and/or procedures to improve function and quality of life- patient understanding with this and that may not be pain free   Discussed with patient about safe storage of medications at home   Discussed possible weaning of medication dosing dependent on treatment/procedure results.  Discussed with patient about risks with procedure including infection, reaction to medication, increased pain, or bleeding.  Reviewed surgery notes and hospital notes    Healing from abdominal surgery still staples. Planning on getting them remived next week.  Will FU in 6 weeks discussed procedures when healed from abdominal surgery- L-facet ALEXY, JOSÉ LUIS, right knee injection     Patient was told to takes extra Percocet post op. Back to home regimen. Percocet 10/325 TID prn- ordered refill when due okay to fill because previous surgery. · Continue Neurontin 300 mg at bedtime- ordered refill      Meds. Prescribed:   Orders Placed This Encounter   Medications    oxyCODONE-acetaminophen (PERCOCET)  MG per tablet     Sig: Take 1 tablet by mouth every 8 hours as needed for Pain for up to 30 days. Intended supply: 30 days     Dispense:  90 tablet     Refill:  0     Reduce doses taken as pain becomes manageable    gabapentin (NEURONTIN) 300 MG capsule     Sig: Take 1 capsule by mouth nightly for 30 days. Dispense:  30 capsule     Refill:  0       Return in about 6 weeks (around 5/25/2022), or if symptoms worsen or fail to improve, for follow up  for medications.                Electronically signed by SAHIL Reyes CNP on4/13/2022 at 8:38 AM

## 2022-04-14 ENCOUNTER — OFFICE VISIT (OUTPATIENT)
Dept: NEPHROLOGY | Age: 62
End: 2022-04-14
Payer: MEDICARE

## 2022-04-14 ENCOUNTER — OFFICE VISIT (OUTPATIENT)
Dept: CARDIOLOGY CLINIC | Age: 62
End: 2022-04-14
Payer: MEDICARE

## 2022-04-14 VITALS
OXYGEN SATURATION: 97 % | TEMPERATURE: 99 F | HEART RATE: 85 BPM | BODY MASS INDEX: 43.67 KG/M2 | SYSTOLIC BLOOD PRESSURE: 118 MMHG | WEIGHT: 315 LBS | DIASTOLIC BLOOD PRESSURE: 67 MMHG

## 2022-04-14 VITALS
SYSTOLIC BLOOD PRESSURE: 136 MMHG | BODY MASS INDEX: 41.75 KG/M2 | HEIGHT: 73 IN | OXYGEN SATURATION: 94 % | DIASTOLIC BLOOD PRESSURE: 60 MMHG | HEART RATE: 74 BPM | WEIGHT: 315 LBS

## 2022-04-14 DIAGNOSIS — R06.09 DOE (DYSPNEA ON EXERTION): ICD-10-CM

## 2022-04-14 DIAGNOSIS — I50.32 CHF (CONGESTIVE HEART FAILURE), NYHA CLASS II, CHRONIC, DIASTOLIC (HCC): Primary | ICD-10-CM

## 2022-04-14 DIAGNOSIS — I12.9 HYPERTENSIVE RENAL DISEASE, STAGE 1 THROUGH STAGE 4 OR UNSPECIFIED CHRONIC KIDNEY DISEASE: ICD-10-CM

## 2022-04-14 DIAGNOSIS — N17.9 AKI (ACUTE KIDNEY INJURY) (HCC): Primary | ICD-10-CM

## 2022-04-14 DIAGNOSIS — D64.9 ANEMIA, UNSPECIFIED TYPE: ICD-10-CM

## 2022-04-14 DIAGNOSIS — N18.32 STAGE 3B CHRONIC KIDNEY DISEASE (HCC): ICD-10-CM

## 2022-04-14 DIAGNOSIS — I10 ESSENTIAL HYPERTENSION: ICD-10-CM

## 2022-04-14 DIAGNOSIS — I27.20 PULMONARY HTN (HCC): ICD-10-CM

## 2022-04-14 DIAGNOSIS — G47.33 OSA (OBSTRUCTIVE SLEEP APNEA): ICD-10-CM

## 2022-04-14 PROCEDURE — 3017F COLORECTAL CA SCREEN DOC REV: CPT | Performed by: INTERNAL MEDICINE

## 2022-04-14 PROCEDURE — 99214 OFFICE O/P EST MOD 30 MIN: CPT | Performed by: NURSE PRACTITIONER

## 2022-04-14 PROCEDURE — G8417 CALC BMI ABV UP PARAM F/U: HCPCS | Performed by: INTERNAL MEDICINE

## 2022-04-14 PROCEDURE — 3017F COLORECTAL CA SCREEN DOC REV: CPT | Performed by: NURSE PRACTITIONER

## 2022-04-14 PROCEDURE — 99213 OFFICE O/P EST LOW 20 MIN: CPT | Performed by: INTERNAL MEDICINE

## 2022-04-14 PROCEDURE — G8427 DOCREV CUR MEDS BY ELIG CLIN: HCPCS | Performed by: NURSE PRACTITIONER

## 2022-04-14 PROCEDURE — 4004F PT TOBACCO SCREEN RCVD TLK: CPT | Performed by: NURSE PRACTITIONER

## 2022-04-14 PROCEDURE — 4004F PT TOBACCO SCREEN RCVD TLK: CPT | Performed by: INTERNAL MEDICINE

## 2022-04-14 PROCEDURE — G8427 DOCREV CUR MEDS BY ELIG CLIN: HCPCS | Performed by: INTERNAL MEDICINE

## 2022-04-14 PROCEDURE — 1111F DSCHRG MED/CURRENT MED MERGE: CPT | Performed by: INTERNAL MEDICINE

## 2022-04-14 PROCEDURE — G8417 CALC BMI ABV UP PARAM F/U: HCPCS | Performed by: NURSE PRACTITIONER

## 2022-04-14 PROCEDURE — 1111F DSCHRG MED/CURRENT MED MERGE: CPT | Performed by: NURSE PRACTITIONER

## 2022-04-14 RX ORDER — POTASSIUM CHLORIDE 750 MG/1
10 TABLET, FILM COATED, EXTENDED RELEASE ORAL DAILY
Qty: 60 TABLET | Refills: 3
Start: 2022-04-14 | End: 2022-07-22 | Stop reason: DRUGHIGH

## 2022-04-14 RX ORDER — BUMETANIDE 1 MG/1
2 TABLET ORAL DAILY
Qty: 60 TABLET | Refills: 6
Start: 2022-04-14 | End: 2022-07-22 | Stop reason: SDUPTHER

## 2022-04-14 ASSESSMENT — ENCOUNTER SYMPTOMS
SHORTNESS OF BREATH: 1
COUGH: 0
ABDOMINAL DISTENTION: 0

## 2022-04-14 NOTE — PATIENT INSTRUCTIONS
You may receive a survey regarding the care you received during your visit. Your input is valuable to us. We encourage you to complete and return your survey. We hope you will choose us in the future for your healthcare needs.     Continue:  · Continue current medications  · Daily weights and record  · Fluid restriction of 2 Liters per day  · Limit sodium in diet to around 9341-7690 mg/day  · Monitor BP  · Activity as tolerated     Call the [facility] for any of the following symptoms: [phone]   Weight gain of 2-3 pounds in 1 day or 5 pounds in 1 week   Increased shortness of breath   Shortness of breath while laying down   Cough   Chest pain   Swelling in feet, ankles or legs   Tenderness or bloating in the abdomen   Fatigue    Decreased appetite or nausea    Confusion

## 2022-04-14 NOTE — PROGRESS NOTES
[facility]    [facility], [address]   [place], Stormy [address]  [phone]  Progress Note  4/14/2022 9:21 AM        Pt Name:    Louisa Herrera  YOB: 1960  Primary Care Physician:  George Henry DO     Chief Complaint:   Chief Complaint   Patient presents with    Follow-up: HTN, CKD  . Background Information/Interval History:   61 male with HTN, hernia surgeries, obesity, DM, chronic constipation, thyroid disorder, BPH, hx abd exp surgery with JENNY due to recurrent small bowel obstructions, chronic loose stools, chronic hypokalemia secondary to GI losses. Has cardioMEMS procedure.      Pt is here for post hospital discharge follow-up. Seeing surgeon for staples removal next week. No new complaints. Feels well today. No fever or chills. No chest pain or shortness of breath. Past History:  Past Medical History:   Diagnosis Date    Arthritis     Back problem     back pain-sees [facility] pain mgmt    Bladder disease     Dr. Freeman Points CHF with unknown LVEF ([facility] [address] 05/24/2021    Dr. George/CHF clinic    Constipation     Diabetes mellitus ([facility] [address]     Dr. Ayse Mcclendon Hypertension     Hypertensive emergency 4/11/2019    Hypertensive urgency 4/13/2019    Sleep apnea     has bipap-sees AZRA Schulte CNP    Thyroid disease      Past Surgical History:   Procedure Laterality Date    ABDOMEN SURGERY      ABDOMEN SURGERY N/A 3/25/2022    ABDOMINAL LYSIS OF ADHESIONS, EXPLANT OF INFECTED MESH performed by Mata Higgins MD at [place] [address]      no stents    CARPAL TUNNEL RELEASE Bilateral     COLONOSCOPY  2017    Dr. Aida Park, [address]      x3 surgeries with 14 repairs    KNEE SURGERY Right 1980's    cartilage    MO EXPLORATORY OF ABDOMEN N/A 2/5/2018    ABDOMINAL EXPLORATION WITH LYSIS OF COMPLICATED ADHESIONS WITH AN EXTENDED RIGHT COLON RESECTION performed by Mata Higgins MD at [place] [facility] VITALS:  /67 (Site: Right Upper Arm, Position: Sitting, Cuff Size: Medium Adult)   Pulse 85   Temp 99 °F (37.2 °C)   Wt (!) 331 lb (150.1 kg)   SpO2 97%   BMI 43.67 kg/m²   Wt Readings from Last 3 Encounters:   04/14/22 (!) 331 lb (150.1 kg)   04/13/22 (!) 326 lb (147.9 kg)   04/12/22 (!) 326 lb (147.9 kg)     Body mass index is 43.67 kg/m². General Appearance: alert and cooperative with exam, appears comfortable, no distress  Neck: No jugular venous distention  Lungs: Air entry B/L, no crackles or rales, no use of accessory muscles  Heart: S1, S2 heard  GI: soft, non-tender, no guarding, obese  Extremities: No sig LE edema     Medications:  Current Outpatient Medications   Medication Sig Dispense Refill    [START ON 4/15/2022] oxyCODONE-acetaminophen (PERCOCET)  MG per tablet Take 1 tablet by mouth every 8 hours as needed for Pain for up to 30 days. Intended supply: 30 days 90 tablet 0    gabapentin (NEURONTIN) 300 MG capsule Take 1 capsule by mouth nightly for 30 days. 30 capsule 0    tamsulosin (FLOMAX) 0.4 MG capsule Take 1 capsule by mouth nightly 90 capsule 3    oxybutynin (DITROPAN XL) 10 MG extended release tablet Take 1 tablet by mouth daily 90 tablet 3    bumetanide (BUMEX) 1 MG tablet Take 1 tablet by mouth daily AND 1 tablet as needed.  60 tablet 6    carvedilol (COREG) 25 MG tablet Take 2 tablets by mouth 2 times daily 60 tablet 3    hydrALAZINE (APRESOLINE) 25 MG tablet Take 1 tablet by mouth every 8 hours 90 tablet 3    levothyroxine (SYNTHROID) 175 MCG tablet Take 1 tablet by mouth Daily 30 tablet 3    amLODIPine (NORVASC) 10 MG tablet Take 1 tablet by mouth daily 30 tablet 3    melatonin 3 MG TABS tablet Take 1.5 tablets by mouth nightly as needed (Sleep) 45 tablet 3    ipratropium-albuterol (DUONEB) 0.5-2.5 (3) MG/3ML SOLN nebulizer solution Inhale 3 mLs into the lungs every 4 hours as needed for Shortness of Breath 360 mL 1    Fluticasone furoate-vilanterol (BREO ELLIPTA) 200-25 MCG/INH AEPB inhaler Inhale 1 puff into the lungs daily 3 each 3    montelukast (SINGULAIR) 10 MG tablet Take 1 tablet by mouth nightly 90 tablet 3    albuterol sulfate  (90 Base) MCG/ACT inhaler Inhale 2 puffs into the lungs every 6 hours as needed for Wheezing or Shortness of Breath 1 each 5    CPAP Machine MISC by Does not apply route Please change bipap 17/13 cm H20. 1 each 0    rOPINIRole (REQUIP) 1 MG tablet Take 1 pill in afternoon and 2 at bedtime 270 tablet 1    doxepin (SINEQUAN) 25 MG capsule Take 2 capsules by mouth nightly 60 capsule 3    ibuprofen (ADVIL;MOTRIN) 200 MG tablet Take 400 mg by mouth every 6 hours as needed for Pain      aspirin EC 81 MG EC tablet Take 1 tablet by mouth daily 90 tablet 1    nitroGLYCERIN (NITROSTAT) 0.4 MG SL tablet Place 1 tablet under the tongue every 5 minutes as needed for Chest pain (up to 3 doses) 25 tablet 3    glimepiride (AMARYL) 4 MG tablet Take 8 mg by mouth daily       azelastine (ASTELIN) 137 MCG/SPRAY nasal spray 1 spray by Nasal route as needed. Use in each nostril as directed       No current facility-administered medications for this visit. Laboratory & Diagnostics:  Feb 2018: R 14.3, L 15.4, Two simple renal cysts  K 4.5, Creat 1.3   June 2018: K 2.8, creat 1.0, Aldosterone 31, Renin 0.5  Sept 2018: K 3.3  Dec 2018: K 4.3, Creat 1.0, Mg 2.0  April EF 40-45%  June 2019: K 4.5, creat 1.3, Mg 1.9, UPCR 610 mg/g  Aug 2019: K 4.4, Creat 1.0, Mg 2.1    Jan 2020: K 4.2, Creat 1.0, UPCR 240 mg/g  July 2020: K 4.2, Creat 0.9  July 2021: K 4.9, creat 1.2, Mg 2.1, UPCR 100 mg/g    April 2022: K 3.5, Creat 2.1, ca 8.3     Impression/Plan:   1. CAROLE on CKD III: Vs progressive CKD. Serum creatinine remains stable albeit at new baseline maybe. Creat is 2.1. Will monitor for now. CKd due to HTN nephrosclerosis. Repeat BMP in one month.  He has about 2.5 grams of proteinuria likely due to DM and obesity possibly post adaptive FSGS  2. HTN: on hydralazine/norvasc and coreg. 3. Chronic diastolic dysfunction with Cardio MEMS: CHF clinic is managing diuretics and potassium. Pt reports CHF clinic changed the diuretics. Patient reports Jody Crofts was stopped due to low BP. Pt is seeing CHF clinic later today. Patient should be on bumex 2 mg daily but he is not sure. He says he will clarify with CHF clinic today. 4. Hx colon surgery/colon resection (Feb 2017): chronic diarrhea  5. Obesity: strongly advised weight loss and lifestyle modification  6. B/L renal simple cysts: US done in Feb 2018  7. DM with proteinuria  8.  Sleep apnea: on CPAP machine    Orders Placed This Encounter   Procedures    Basic Metabolic Panel    Magnesium    Basic Metabolic Panel       Albin Dawkins MD  [facility]

## 2022-04-14 NOTE — PROGRESS NOTES
Heart Failure Clinic       Visit Date: 4/14/2022  Cardiologist:  Dr. Kika Mckeon  Primary Care Physician: Dr. Lele Castillo,     Jorge Kumari is a 63 y.o. male who presents today for:  Chief Complaint   Patient presents with    Congestive Heart Failure       HPI:   Jorge Kumari is a 63 y.o. male who presents to the office for a follow up patient visit in the heart failure clinic. Accompanied by no one  Lives w/ wife  Raising 7 and 11yr old grd dtrs     TYPE HF: HFrecoveredEF (40-45% - improved to 55-60%)  Device: no  HX: HTN, DM, LAURIE (CPAP), CKD Aceanuradha Piper), Partial colectomy (2017), never smoker (wife does)  CardioMEMS implanted 6/16/21 - PAD goal 19-25mmHg  12/1 = C w/ Nallu = nonobstructive disease, elevated LVEDP, PCW 35mmHg     Dry Wt:  350     Hospitalization:  March 2022 x 2 weeks - Sepsis 2/2 abd wall - mesh. Surgery w/ Hixenbaugh 3/25/22. Worsening CKD - Nephrology following. Anemia - Hgb 7-8  Saw Dr Forest Adams this AM   Today: feeling fair - getting stronger, slowly. Takes him 15 in to go up 18 steps (bedroom)  Mild ankle edema. + cough - loose. Wts stable. Patient has:  Chest Pain: no  SOB: yes   Orthopnea/PND: no - in bed  LAURIE: compliant w/ Bipap  Edema: mild   Fatigue: yes   Abdominal bloating: no  Cough: yes   Appetite: good  Home weight: stable  Home blood pressure: stable    Past Medical History:   Diagnosis Date    Arthritis     Back problem     back pain-sees [facility] pain mgmt    Bladder disease     Dr. Zohreh Guajardo CHF with unknown LVEF ([facility] [address] 05/24/2021    Dr. George/CHF clinic    Constipation     Diabetes mellitus ([facility] [address]     Dr. Mere Jacob Hypertension     Hypertensive emergency 4/11/2019    Hypertensive urgency 4/13/2019    Sleep apnea     has bipap-sees AZRA Blake CNP    Thyroid disease      Past Surgical History:   Procedure Laterality Date    ABDOMEN SURGERY      ABDOMEN SURGERY N/A 3/25/2022    ABDOMINAL LYSIS OF ADHESIONS, EXPLANT OF INFECTED MESH performed by Sandee Keyes Andrew Marquez MD at [address] [facility]      no stents    CARPAL TUNNEL RELEASE Bilateral     COLONOSCOPY  2017    Dr. Valeria Munoz, ESOPHAGUS     [address] [facility]      x3 surgeries with 14 repairs    KNEE SURGERY Right 1980's    cartilage    MT EXPLORATORY OF ABDOMEN N/A 2/5/2018    ABDOMINAL EXPLORATION WITH LYSIS OF COMPLICATED ADHESIONS WITH AN EXTENDED RIGHT COLON RESECTION performed by Kenny Chua MD at [address] [facility] History   Problem Relation Age of Onset    Cancer Mother         breast    Heart Disease Father         bladder, lung    Cancer Father     Stroke Brother     Heart Attack Brother     Prostate Cancer Brother     Diabetes Paternal Grandmother     Arthritis Brother     High Blood Pressure Neg Hx      Social History     Tobacco Use    Smoking status: Former Smoker     Types: Pipe    Smokeless tobacco: Current User     Types: Chew    Tobacco comment: quit pipe over 30 yrs ago   Substance Use Topics    Alcohol use: No     Alcohol/week: 0.0 standard drinks     Comment: quit     Current Outpatient Medications   Medication Sig Dispense Refill    bumetanide (BUMEX) 1 MG tablet Take 2 tablets by mouth daily AND 1 tablet as needed. 60 tablet 6    potassium chloride (K-TAB) 10 MEQ extended release tablet Take 1 tablet by mouth daily 60 tablet 3    [START ON 4/15/2022] oxyCODONE-acetaminophen (PERCOCET)  MG per tablet Take 1 tablet by mouth every 8 hours as needed for Pain for up to 30 days. Intended supply: 30 days 90 tablet 0    gabapentin (NEURONTIN) 300 MG capsule Take 1 capsule by mouth nightly for 30 days.  30 capsule 0    tamsulosin (FLOMAX) 0.4 MG capsule Take 1 capsule by mouth nightly 90 capsule 3    oxybutynin (DITROPAN XL) 10 MG extended release tablet Take 1 tablet by mouth daily 90 tablet 3    carvedilol (COREG) 25 MG tablet Take 2 tablets by mouth 2 times daily 60 tablet 3    hydrALAZINE (APRESOLINE) 25 MG tablet Take 1 tablet by mouth every 8 hours 90 tablet 3    levothyroxine (SYNTHROID) 175 MCG tablet Take 1 tablet by mouth Daily 30 tablet 3    amLODIPine (NORVASC) 10 MG tablet Take 1 tablet by mouth daily 30 tablet 3    melatonin 3 MG TABS tablet Take 1.5 tablets by mouth nightly as needed (Sleep) 45 tablet 3    ipratropium-albuterol (DUONEB) 0.5-2.5 (3) MG/3ML SOLN nebulizer solution Inhale 3 mLs into the lungs every 4 hours as needed for Shortness of Breath 360 mL 1    Fluticasone furoate-vilanterol (BREO ELLIPTA) 200-25 MCG/INH AEPB inhaler Inhale 1 puff into the lungs daily 3 each 3    montelukast (SINGULAIR) 10 MG tablet Take 1 tablet by mouth nightly 90 tablet 3    albuterol sulfate  (90 Base) MCG/ACT inhaler Inhale 2 puffs into the lungs every 6 hours as needed for Wheezing or Shortness of Breath 1 each 5    CPAP Machine MISC by Does not apply route Please change bipap 17/13 cm H20. 1 each 0    rOPINIRole (REQUIP) 1 MG tablet Take 1 pill in afternoon and 2 at bedtime 270 tablet 1    doxepin (SINEQUAN) 25 MG capsule Take 2 capsules by mouth nightly 60 capsule 3    ibuprofen (ADVIL;MOTRIN) 200 MG tablet Take 400 mg by mouth every 6 hours as needed for Pain      aspirin EC 81 MG EC tablet Take 1 tablet by mouth daily 90 tablet 1    nitroGLYCERIN (NITROSTAT) 0.4 MG SL tablet Place 1 tablet under the tongue every 5 minutes as needed for Chest pain (up to 3 doses) 25 tablet 3    glimepiride (AMARYL) 4 MG tablet Take 4 mg by mouth daily       azelastine (ASTELIN) 137 MCG/SPRAY nasal spray 1 spray by Nasal route as needed. Use in each nostril as directed       No current facility-administered medications for this visit.      Allergies   Allergen Reactions    Shellfish-Derived Products Swelling     Tolerates IV dye without any problems      Pcn [Penicillins] Itching    Succinylcholine      Pseudocholinesterase Deficiency    Sulfa Antibiotics Itching    Morphine Nausea And Vomiting     \"head swimming, skin crawling\"    Tape Aneta Blonder Tape] Rash       SUBJECTIVE:   Review of Systems   Constitutional: Positive for fatigue. Negative for activity change and appetite change. Respiratory: Positive for shortness of breath. Negative for cough. Cardiovascular: Positive for leg swelling. Negative for chest pain and palpitations. Gastrointestinal: Negative for abdominal distention. Neurological: Negative for weakness, light-headedness and headaches. Hematological: Negative for adenopathy. Psychiatric/Behavioral: Negative for sleep disturbance. OBJECTIVE:   Today's Vitals:  /60   Pulse 74   Ht 6' 1\" (1.854 m)   Wt (!) 332 lb 3.2 oz (150.7 kg)   SpO2 94%   BMI 43.83 kg/m²     Physical Exam  Vitals reviewed. Constitutional:       General: He is not in acute distress. Appearance: Normal appearance. He is well-developed. He is not diaphoretic. HENT:      Head: Normocephalic and atraumatic. Eyes:      Conjunctiva/sclera: Conjunctivae normal.   Neck:      Comments: No JVD  Cardiovascular:      Rate and Rhythm: Normal rate and regular rhythm. Heart sounds: Normal heart sounds. No murmur heard. Pulmonary:      Effort: Pulmonary effort is normal. No respiratory distress. Breath sounds: Normal breath sounds. No wheezing or rales. Abdominal:      General: Bowel sounds are normal. There is no distension. Palpations: Abdomen is soft. Tenderness: There is no abdominal tenderness. Musculoskeletal:         General: Normal range of motion. Cervical back: Normal range of motion and neck supple. Right lower leg: Edema (scant ankle/calf) present. Left lower leg: Edema (scant ankle/calf) present. Skin:     General: Skin is warm and dry. Capillary Refill: Capillary refill takes less than 2 seconds. Comments: abd incision, stable approx, intact.   No drainage, erythema   Neurological:      Mental Status: He is alert and oriented to person, place, and time. Coordination: Coordination normal.   Psychiatric:         Behavior: Behavior normal.       Wt Readings from Last 3 Encounters:   04/14/22 (!) 332 lb 3.2 oz (150.7 kg)   04/14/22 (!) 331 lb (150.1 kg)   04/13/22 (!) 326 lb (147.9 kg)     BP Readings from Last 3 Encounters:   04/14/22 136/60   04/14/22 118/67   04/13/22 130/72     Pulse Readings from Last 3 Encounters:   04/14/22 74   04/14/22 85   03/30/22 62     Body mass index is 43.83 kg/m². ECHO:    Summary   Left ventricle size is normal.   Moderate concentric left ventricular hypertrophy.   There were no regional wall motion abnormalities.   Ejection fraction is visually estimated in the range of 55% to 60%.     Signature      ----------------------------------------------------------------   Electronically signed by Cesilia Estrella MD (Interpreting   [email or web address]) on 05/25/2021 at 04:48 PM   ----------------------------------------------------------------     CATH/STRESS:   RIGHT HEART CATHETERIZATION:  1.  RA is 25.  2.  RV is 80/23, 27.  3.  PA is 82/44, 59.  4.  Wedge pressure is 35.  5.  LV is 188/28, 36.  6.  AO was 205/126, 160.  7.  AO saturation is 91%. 8.  PA saturation is 63%. 9.  Cardiac output by Cherelle method is 5.9. 10.  Cardiac index is 2.1.     CORONARY ANATOMY:  1.  Right coronary artery is a dominant vessel.  The vessel is quite  large and has mild luminal irregularities in the proximal, mid, and  distal portions of the vessel; otherwise, it is patent. 2.  Left main is patent, gives rise to LAD and left circumflex arteries. 3.  Left circumflex artery is patent without any significant disease. 4.  LAD is patent in the proximal portion, mid portion there is a 30% to  40% stenosis followed by mild luminal irregularities in the distal  portion of the LAD. 5.  LV gram was not performed due to renal dysfunction.     The patient tolerated the procedure well without any significant issues  or complications.  Hemostasis was achieved with a Vasc Band device.     SUMMARY:  1.  Elevated right heart pressures with elevated LVEPD. 2.  Patent coronaries.     RECOMMENDATIONS:  1.  IV diuresis. 2.  Monitor electrolytes. 3.  Optimize heart failure therapy. 4.  Weight loss advised. 5.  Optimize sleep apnea therapies. 6.  Follow up in clinic in one to two weeks to evaluate symptoms  further.        Wayne Vogel MD     D: 12/06/2020 21:57:38        Results reviewed:  BNP: No results found for: BNP  CBC:   Lab Results   Component Value Date    WBC 5.3 03/29/2022    RBC 3.25 03/29/2022    RBC 5.15 08/12/2011    HGB 8.6 03/29/2022    HCT 29.2 03/29/2022     03/29/2022     CMP:    Lab Results   Component Value Date     04/07/2022    K 3.5 04/07/2022    K 3.6 03/22/2022     04/07/2022    CO2 24 04/07/2022    BUN 24 04/07/2022    CREATININE 2.1 04/07/2022    LABGLOM 32 04/07/2022    GLUCOSE 136 04/07/2022    CALCIUM 8.3 04/07/2022     Hepatic Function Panel:    Lab Results   Component Value Date    ALKPHOS 104 03/18/2022    ALT 19 03/18/2022    AST 15 03/18/2022    PROT 7.3 03/18/2022    BILITOT 0.4 03/18/2022    BILIDIR <0.2 03/18/2022    LABALBU 2.9 03/18/2022     Magnesium:    Lab Results   Component Value Date    MG 1.9 03/30/2022     PT/INR:    Lab Results   Component Value Date    INR 1.14 06/16/2021     Lipids:    Lab Results   Component Value Date    TRIG 107 05/25/2021    HDL 35 05/25/2021    [address] 96 05/25/2021       ASSESSMENT AND PLAN:      Diagnosis Orders   1. CHF (congestive heart failure), NYHA class II, chronic, diastolic (HCC)  CBC    Basic Metabolic Panel   2. Anemia, unspecified type     3. Stage 3b chronic kidney disease ([facility] [address]     4. Essential hypertension     5. Pulmonary HTN ([facility] [address]     6. LAURIE (obstructive sleep apnea)     7.  AKINS (dyspnea on exertion)       Continue:  GDMT:   ACE/ARB/ARNI - none - was on Entresto in past (stopped d/t CKD, low BPs)   BB - Coreg 25 BID   SGLT2 -  no  AA - stopped  Diuretic - Bumex 1/day - increase to 2mg/day  Vasodilator - Hydralazine 25 TID  Other - Norvasc  Stable mild Hypervolemia - mild LE edema, continued SOB/AKINS, CardioMEMs elevated  CardioMEMs has been elevated since beginning March - likely multifactorial d/t sepsis d/t infected mesh, worsening CKD, worsening Anemia  Increase Bumex to 2/day   Add Kcl 10 meq/day (has at home)  Anemia noted since March (averaging 7-9gm, prior was around 12-14) - unknown cause. Most likely Anemia of Chronic disease - no signs of current blood loss  Worsening CKD since March - new baseline appears around 2.0-2.4  Lab reviewed - stable  Repeat blood work in 1 week - BMP, CBC  BP/HR stable   Continue diet/fluid adherence  Continue daily wts. F/U w/ Cardiology/Nallu in 3 mth  F/U in clinic in 6 mth    ADDENDUM 5/3/22  Patient was evaluated today for the diagnosis of CHF. I entered a DME order for home oxygen because the diagnosis and testing requires the patient to have supplemental oxygen. Condition will improve or be benefited by oxygen use. The patient is  able to perform good mobility in a home setting and therefore does require the use of a portable oxygen system. The need for this equipment was discussed with the patient and he understands and is in agreement. Tolerating above noted HF meds, no ill side effects noted. Will continue to monitor kidney function and electrolytes. Will optimize as tolerated. Pt is compliant w/ medications. Total visit time of 30 minutes has been spent with patient on education of symptoms, management, medication, and plan of care; as well as review of chart: labs, ECHO, radiology reports, etc.   I personally spent more then 50% of the appt time face to face with the patient.   · Daily weights  · Fluid restriction of 2 Liters per day  · Limit sodium in diet to around 1199-2762 mg/day  · Monitor BP  · Activity as tolerated     Patient was instructed to call the Heart Failure Clinic for any changes in symptoms as noted in AVS.      Return in about 6 months (around 10/14/2022). or sooner if needed     Patient given educational materials - see patient instructions. We discussed the importance of weighing oneself and recording daily. We also discussed the importance of a low sodium diet, higher sodium foods to avoid and better low sodium food options. Patient verbalizes understanding of plan of care using teach back method, and is agreeable to the treatment plan.        Electronically signed by SAHIL Platt CNP on 4/14/2022 at 1:14 PM

## 2022-04-19 ENCOUNTER — OFFICE VISIT (OUTPATIENT)
Dept: SURGERY | Age: 62
End: 2022-04-19

## 2022-04-19 VITALS
HEIGHT: 73 IN | TEMPERATURE: 97.8 F | BODY MASS INDEX: 41.75 KG/M2 | HEART RATE: 88 BPM | SYSTOLIC BLOOD PRESSURE: 170 MMHG | WEIGHT: 315 LBS | DIASTOLIC BLOOD PRESSURE: 98 MMHG

## 2022-04-19 DIAGNOSIS — Z09 POSTOPERATIVE EXAMINATION: Primary | ICD-10-CM

## 2022-04-19 PROCEDURE — 99024 POSTOP FOLLOW-UP VISIT: CPT | Performed by: NURSE PRACTITIONER

## 2022-04-19 ASSESSMENT — ENCOUNTER SYMPTOMS
ABDOMINAL PAIN: 1
SHORTNESS OF BREATH: 1
NAUSEA: 1

## 2022-04-19 NOTE — PROGRESS NOTES
[address] [place] [address] Dr Kianna Wang [facility] [address]  Dept: [phone]  Dept Fax: [phone]  Loc: [phone]    Visit Date: 4/19/2022       Cherelle Espino is a 63 y.o. male who presents today for:  Chief Complaint   Patient presents with    Post-Op Check     s/p 3/25 1. Removal of portion of infected mesh. 2.  Lysis of significant adhesions. HPI:     Roberto Landon presents today for a 3 week post op check. Today He complains of low grade fevers in evening with chills. He is tolerating meals, had nausea with antibiotics but has resolved. His bowels are moving. The incision looks good, there are no signs of infection noted. All staples removed today. He is taking chronic pain medication from Pain management. Discussed with Dr Sukhdeep Bryant regarding fevers/chill with a history of mesh infection to determine if we need a CT scan at this time. Dr Sukhdeep Bryant does not feel a CT is necessary at this time, there are no signs of infection at the incision site. We will plan to follow up in 2 weeks, call with concerns. Past Medical History:   Diagnosis Date    Arthritis     Back problem     back pain-sees [facility] pain mgmt    Bladder disease     Dr. Denotn [place] CHF with unknown LVEF ([facility] [address] 05/24/2021    Dr. George/CHF clinic    Constipation     Diabetes mellitus ([facility]     Dr. Dusty Raphael Hypertension     Hypertensive emergency 4/11/2019    Hypertensive urgency 4/13/2019    Sleep apnea     has bipap-sees AZRA Briones CNP    Thyroid disease       Past Surgical History:   Procedure Laterality Date    ABDOMEN SURGERY      ABDOMEN SURGERY N/A 3/25/2022    ABDOMINAL LYSIS OF ADHESIONS, EXPLANT OF INFECTED MESH performed by Rancho Fairchild MD at [place] [address]      no stents    CARPAL TUNNEL RELEASE Bilateral     COLONOSCOPY  2017    Dr. Maria Del Carmen Cruz, ESOPHAGUS     [address]      x3 TABS tablet Take 1.5 tablets by mouth nightly as needed (Sleep) 45 tablet 3    ipratropium-albuterol (DUONEB) 0.5-2.5 (3) MG/3ML SOLN nebulizer solution Inhale 3 mLs into the lungs every 4 hours as needed for Shortness of Breath 360 mL 1    Fluticasone furoate-vilanterol (BREO ELLIPTA) 200-25 MCG/INH AEPB inhaler Inhale 1 puff into the lungs daily 3 each 3    montelukast (SINGULAIR) 10 MG tablet Take 1 tablet by mouth nightly 90 tablet 3    albuterol sulfate  (90 Base) MCG/ACT inhaler Inhale 2 puffs into the lungs every 6 hours as needed for Wheezing or Shortness of Breath 1 each 5    CPAP Machine MISC by Does not apply route Please change bipap 17/13 cm H20. 1 each 0    rOPINIRole (REQUIP) 1 MG tablet Take 1 pill in afternoon and 2 at bedtime 270 tablet 1    doxepin (SINEQUAN) 25 MG capsule Take 2 capsules by mouth nightly 60 capsule 3    ibuprofen (ADVIL;MOTRIN) 200 MG tablet Take 400 mg by mouth every 6 hours as needed for Pain      aspirin EC 81 MG EC tablet Take 1 tablet by mouth daily 90 tablet 1    nitroGLYCERIN (NITROSTAT) 0.4 MG SL tablet Place 1 tablet under the tongue every 5 minutes as needed for Chest pain (up to 3 doses) 25 tablet 3    glimepiride (AMARYL) 4 MG tablet Take 4 mg by mouth daily       azelastine (ASTELIN) 137 MCG/SPRAY nasal spray 1 spray by Nasal route as needed. Use in each nostril as directed       No current facility-administered medications for this visit. Allergies   Allergen Reactions    Shellfish-Derived Products Swelling     Tolerates IV dye without any problems      Pcn [Penicillins] Itching    Succinylcholine      Pseudocholinesterase Deficiency    Sulfa Antibiotics Itching    Morphine Nausea And Vomiting     \"head swimming, skin crawling\"    Tape [Adhesive Tape] Rash       Subjective:      Review of Systems   Constitutional: Positive for activity change, chills and fever. HENT: Negative. Respiratory: Positive for shortness of breath. Cardiovascular: Negative. Gastrointestinal: Positive for abdominal pain (postoperative ) and nausea (with antibiotics ). Neurological: Negative. Objective:     BP (!) 170/98   Pulse 88   Temp 97.8 °F (36.6 °C)   Ht 6' 1\" (1.854 m)   Wt (!) 335 lb (152 kg)   BMI 44.20 kg/m²     Wt Readings from Last 3 Encounters:   04/19/22 (!) 335 lb (152 kg)   04/14/22 (!) 332 lb 3.2 oz (150.7 kg)   04/14/22 (!) 331 lb (150.1 kg)       Physical Exam  Vitals reviewed. Constitutional:       Appearance: He is well-developed. He is obese. HENT:      Head: Normocephalic. Eyes:      Pupils: Pupils are equal, round, and reactive to light. Cardiovascular:      Rate and Rhythm: Normal rate. Pulmonary:      Effort: Pulmonary effort is normal.   Abdominal:      Palpations: Abdomen is soft. Musculoskeletal:         General: Normal range of motion. Cervical back: Normal range of motion. Skin:     General: Skin is warm and dry. Neurological:      Mental Status: He is alert and oriented to person, place, and time. Assessment/Plan:     Facundo Agee was seen today for post-op check. Diagnoses and all orders for this visit:    Postoperative examination        Return in 2 weeks (on 5/3/2022). Patient Instructions   You may receive a survey regarding the care you received during your visit. Your input is valuable to us. We encourage you to complete and return your survey. We hope you will choose us in the future for your healthcare needs. Follow up in 2 weeks  Call with concerns   Lifetime lifting restrictions  less then 50lbs         Discusseduse, benefit, and side effects of prescribed medications. All patient questionsanswered. Pt voiced understanding.      Electronically signed by Saintclair Sanger, APRN - CNP on 4/19/2022 at 2:56 PM

## 2022-04-19 NOTE — PATIENT INSTRUCTIONS
You may receive a survey regarding the care you received during your visit. Your input is valuable to us. We encourage you to complete and return your survey. We hope you will choose us in the future for your healthcare needs.     Follow up in 2 weeks  Call with concerns   Lifetime lifting restrictions  less then 50lbs

## 2022-04-20 ENCOUNTER — HOSPITAL ENCOUNTER (INPATIENT)
Age: 62
LOS: 4 days | Discharge: HOME | DRG: 291 | End: 2022-04-24
Attending: EMERGENCY MEDICINE | Admitting: INTERNAL MEDICINE
Payer: MEDICARE

## 2022-04-20 ENCOUNTER — OFFICE VISIT (OUTPATIENT)
Dept: PULMONOLOGY | Age: 62
End: 2022-04-20
Payer: MEDICARE

## 2022-04-20 ENCOUNTER — APPOINTMENT (OUTPATIENT)
Dept: GENERAL RADIOLOGY | Age: 62
DRG: 291 | End: 2022-04-20
Payer: MEDICARE

## 2022-04-20 VITALS
HEIGHT: 73 IN | TEMPERATURE: 97.8 F | DIASTOLIC BLOOD PRESSURE: 80 MMHG | OXYGEN SATURATION: 90 % | BODY MASS INDEX: 41.75 KG/M2 | WEIGHT: 315 LBS | SYSTOLIC BLOOD PRESSURE: 130 MMHG | HEART RATE: 77 BPM

## 2022-04-20 DIAGNOSIS — G47.33 OSA TREATED WITH BIPAP: ICD-10-CM

## 2022-04-20 DIAGNOSIS — R42 LIGHTHEADED: ICD-10-CM

## 2022-04-20 DIAGNOSIS — E66.01 MORBID OBESITY WITH BMI OF 40.0-44.9, ADULT (HCC): ICD-10-CM

## 2022-04-20 DIAGNOSIS — J96.01 ACUTE HYPOXEMIC RESPIRATORY FAILURE (HCC): Primary | ICD-10-CM

## 2022-04-20 DIAGNOSIS — R06.02 SHORTNESS OF BREATH: ICD-10-CM

## 2022-04-20 DIAGNOSIS — I50.9 CONGESTIVE HEART FAILURE, UNSPECIFIED HF CHRONICITY, UNSPECIFIED HEART FAILURE TYPE (HCC): Primary | ICD-10-CM

## 2022-04-20 DIAGNOSIS — R50.9 FEVER, UNSPECIFIED FEVER CAUSE: ICD-10-CM

## 2022-04-20 LAB
ALBUMIN SERPL-MCNC: 3 G/DL (ref 3.5–5.1)
ALLEN TEST: POSITIVE
ALP BLD-CCNC: 104 U/L (ref 38–126)
ALT SERPL-CCNC: 17 U/L (ref 11–66)
ANION GAP SERPL CALCULATED.3IONS-SCNC: 12 MEQ/L (ref 8–16)
AST SERPL-CCNC: 16 U/L (ref 5–40)
BASE EXCESS (CALCULATED): 4.5 MMOL/L (ref -2.5–2.5)
BASOPHILS # BLD: 0.3 %
BASOPHILS ABSOLUTE: 0 THOU/MM3 (ref 0–0.1)
BILIRUB SERPL-MCNC: 0.5 MG/DL (ref 0.3–1.2)
BILIRUBIN DIRECT: < 0.2 MG/DL (ref 0–0.3)
BUN BLDV-MCNC: 19 MG/DL (ref 7–22)
CALCIUM SERPL-MCNC: 8.2 MG/DL (ref 8.5–10.5)
CHLORIDE BLD-SCNC: 100 MEQ/L (ref 98–111)
CO2: 25 MEQ/L (ref 23–33)
COLLECTED BY:: ABNORMAL
CREAT SERPL-MCNC: 1.8 MG/DL (ref 0.4–1.2)
DEVICE: ABNORMAL
EKG ATRIAL RATE: 77 BPM
EKG P AXIS: 4 DEGREES
EKG P-R INTERVAL: 148 MS
EKG Q-T INTERVAL: 418 MS
EKG QRS DURATION: 106 MS
EKG QTC CALCULATION (BAZETT): 473 MS
EKG R AXIS: 10 DEGREES
EKG T AXIS: 51 DEGREES
EKG VENTRICULAR RATE: 77 BPM
EOSINOPHIL # BLD: 2 %
EOSINOPHILS ABSOLUTE: 0.1 THOU/MM3 (ref 0–0.4)
ERYTHROCYTE [DISTWIDTH] IN BLOOD BY AUTOMATED COUNT: 17.3 % (ref 11.5–14.5)
ERYTHROCYTE [DISTWIDTH] IN BLOOD BY AUTOMATED COUNT: 52.7 FL (ref 35–45)
GFR SERPL CREATININE-BSD FRML MDRD: 39 ML/MIN/1.73M2
GLUCOSE BLD-MCNC: 125 MG/DL (ref 70–108)
HCO3: 28 MMOL/L (ref 23–28)
HCT VFR BLD CALC: 27.3 % (ref 42–52)
HEMOGLOBIN: 8.2 GM/DL (ref 14–18)
IFIO2: 5
IMMATURE GRANS (ABS): 0.07 THOU/MM3 (ref 0–0.07)
IMMATURE GRANULOCYTES: 1 %
LYMPHOCYTES # BLD: 7.3 %
LYMPHOCYTES ABSOLUTE: 0.5 THOU/MM3 (ref 1–4.8)
MAGNESIUM: 1.6 MG/DL (ref 1.6–2.4)
MCH RBC QN AUTO: 25.2 PG (ref 26–33)
MCHC RBC AUTO-ENTMCNC: 30 GM/DL (ref 32.2–35.5)
MCV RBC AUTO: 84 FL (ref 80–94)
MONOCYTES # BLD: 8.1 %
MONOCYTES ABSOLUTE: 0.6 THOU/MM3 (ref 0.4–1.3)
NUCLEATED RED BLOOD CELLS: 0 /100 WBC
O2 SATURATION: 97 %
OSMOLALITY CALCULATION: 277.6 MOSMOL/KG (ref 275–300)
PCO2: 34 MMHG (ref 35–45)
PH BLOOD GAS: 7.52 (ref 7.35–7.45)
PLATELET # BLD: 276 THOU/MM3 (ref 130–400)
PMV BLD AUTO: 8.4 FL (ref 9.4–12.4)
PO2: 85 MMHG (ref 71–104)
POTASSIUM REFLEX MAGNESIUM: 3.1 MEQ/L (ref 3.5–5.2)
PRO-BNP: 4743 PG/ML (ref 0–900)
RBC # BLD: 3.25 MILL/MM3 (ref 4.7–6.1)
SARS-COV-2, NAAT: NOT  DETECTED
SEG NEUTROPHILS: 81.3 %
SEGMENTED NEUTROPHILS ABSOLUTE COUNT: 5.5 THOU/MM3 (ref 1.8–7.7)
SODIUM BLD-SCNC: 137 MEQ/L (ref 135–145)
SOURCE, BLOOD GAS: ABNORMAL
TOTAL PROTEIN: 7.3 G/DL (ref 6.1–8)
TROPONIN T: 0.02 NG/ML
WBC # BLD: 6.8 THOU/MM3 (ref 4.8–10.8)

## 2022-04-20 PROCEDURE — 94760 N-INVAS EAR/PLS OXIMETRY 1: CPT

## 2022-04-20 PROCEDURE — 87635 SARS-COV-2 COVID-19 AMP PRB: CPT

## 2022-04-20 PROCEDURE — 6370000000 HC RX 637 (ALT 250 FOR IP)

## 2022-04-20 PROCEDURE — 3017F COLORECTAL CA SCREEN DOC REV: CPT | Performed by: PHYSICIAN ASSISTANT

## 2022-04-20 PROCEDURE — 99215 OFFICE O/P EST HI 40 MIN: CPT | Performed by: PHYSICIAN ASSISTANT

## 2022-04-20 PROCEDURE — 6360000002 HC RX W HCPCS

## 2022-04-20 PROCEDURE — 93005 ELECTROCARDIOGRAM TRACING: CPT | Performed by: STUDENT IN AN ORGANIZED HEALTH CARE EDUCATION/TRAINING PROGRAM

## 2022-04-20 PROCEDURE — 36600 WITHDRAWAL OF ARTERIAL BLOOD: CPT

## 2022-04-20 PROCEDURE — 80076 HEPATIC FUNCTION PANEL: CPT

## 2022-04-20 PROCEDURE — 71045 X-RAY EXAM CHEST 1 VIEW: CPT

## 2022-04-20 PROCEDURE — 99223 1ST HOSP IP/OBS HIGH 75: CPT

## 2022-04-20 PROCEDURE — G8417 CALC BMI ABV UP PARAM F/U: HCPCS | Performed by: PHYSICIAN ASSISTANT

## 2022-04-20 PROCEDURE — 2700000000 HC OXYGEN THERAPY PER DAY

## 2022-04-20 PROCEDURE — 94640 AIRWAY INHALATION TREATMENT: CPT

## 2022-04-20 PROCEDURE — 6360000002 HC RX W HCPCS: Performed by: EMERGENCY MEDICINE

## 2022-04-20 PROCEDURE — 1200000003 HC TELEMETRY R&B

## 2022-04-20 PROCEDURE — 1111F DSCHRG MED/CURRENT MED MERGE: CPT | Performed by: PHYSICIAN ASSISTANT

## 2022-04-20 PROCEDURE — 85025 COMPLETE CBC W/AUTO DIFF WBC: CPT

## 2022-04-20 PROCEDURE — 80048 BASIC METABOLIC PNL TOTAL CA: CPT

## 2022-04-20 PROCEDURE — 2580000003 HC RX 258

## 2022-04-20 PROCEDURE — 4004F PT TOBACCO SCREEN RCVD TLK: CPT | Performed by: PHYSICIAN ASSISTANT

## 2022-04-20 PROCEDURE — 82803 BLOOD GASES ANY COMBINATION: CPT

## 2022-04-20 PROCEDURE — 83735 ASSAY OF MAGNESIUM: CPT

## 2022-04-20 PROCEDURE — 84484 ASSAY OF TROPONIN QUANT: CPT

## 2022-04-20 PROCEDURE — 99285 EMERGENCY DEPT VISIT HI MDM: CPT

## 2022-04-20 PROCEDURE — 2500000003 HC RX 250 WO HCPCS

## 2022-04-20 PROCEDURE — G8427 DOCREV CUR MEDS BY ELIG CLIN: HCPCS | Performed by: PHYSICIAN ASSISTANT

## 2022-04-20 PROCEDURE — 83880 ASSAY OF NATRIURETIC PEPTIDE: CPT

## 2022-04-20 RX ORDER — SODIUM CHLORIDE 0.9 % (FLUSH) 0.9 %
10 SYRINGE (ML) INJECTION EVERY 12 HOURS SCHEDULED
Status: DISCONTINUED | OUTPATIENT
Start: 2022-04-20 | End: 2022-04-24 | Stop reason: HOSPADM

## 2022-04-20 RX ORDER — ONDANSETRON 4 MG/1
4 TABLET, ORALLY DISINTEGRATING ORAL EVERY 8 HOURS PRN
Status: DISCONTINUED | OUTPATIENT
Start: 2022-04-20 | End: 2022-04-24 | Stop reason: HOSPADM

## 2022-04-20 RX ORDER — POTASSIUM CHLORIDE 7.45 MG/ML
10 INJECTION INTRAVENOUS PRN
Status: DISCONTINUED | OUTPATIENT
Start: 2022-04-20 | End: 2022-04-24 | Stop reason: HOSPADM

## 2022-04-20 RX ORDER — TAMSULOSIN HYDROCHLORIDE 0.4 MG/1
0.4 CAPSULE ORAL NIGHTLY
Status: DISCONTINUED | OUTPATIENT
Start: 2022-04-20 | End: 2022-04-24 | Stop reason: HOSPADM

## 2022-04-20 RX ORDER — FUROSEMIDE 10 MG/ML
40 INJECTION INTRAMUSCULAR; INTRAVENOUS ONCE
Status: COMPLETED | OUTPATIENT
Start: 2022-04-20 | End: 2022-04-20

## 2022-04-20 RX ORDER — AZELASTINE 1 MG/ML
1 SPRAY, METERED NASAL PRN
Status: DISCONTINUED | OUTPATIENT
Start: 2022-04-20 | End: 2022-04-20 | Stop reason: RX

## 2022-04-20 RX ORDER — IPRATROPIUM BROMIDE AND ALBUTEROL SULFATE 2.5; .5 MG/3ML; MG/3ML
1 SOLUTION RESPIRATORY (INHALATION)
Status: DISCONTINUED | OUTPATIENT
Start: 2022-04-20 | End: 2022-04-23

## 2022-04-20 RX ORDER — MONTELUKAST SODIUM 10 MG/1
10 TABLET ORAL NIGHTLY
Status: DISCONTINUED | OUTPATIENT
Start: 2022-04-20 | End: 2022-04-24 | Stop reason: HOSPADM

## 2022-04-20 RX ORDER — POLYETHYLENE GLYCOL 3350 17 G/17G
17 POWDER, FOR SOLUTION ORAL DAILY PRN
Status: DISCONTINUED | OUTPATIENT
Start: 2022-04-20 | End: 2022-04-24 | Stop reason: HOSPADM

## 2022-04-20 RX ORDER — ONDANSETRON 2 MG/ML
4 INJECTION INTRAMUSCULAR; INTRAVENOUS EVERY 6 HOURS PRN
Status: DISCONTINUED | OUTPATIENT
Start: 2022-04-20 | End: 2022-04-24 | Stop reason: HOSPADM

## 2022-04-20 RX ORDER — SODIUM CHLORIDE 9 MG/ML
INJECTION, SOLUTION INTRAVENOUS PRN
Status: DISCONTINUED | OUTPATIENT
Start: 2022-04-20 | End: 2022-04-24 | Stop reason: HOSPADM

## 2022-04-20 RX ORDER — OXYBUTYNIN CHLORIDE 10 MG/1
10 TABLET, EXTENDED RELEASE ORAL DAILY
Status: DISCONTINUED | OUTPATIENT
Start: 2022-04-20 | End: 2022-04-22

## 2022-04-20 RX ORDER — ALBUTEROL SULFATE 90 UG/1
2 AEROSOL, METERED RESPIRATORY (INHALATION) EVERY 6 HOURS PRN
Status: DISCONTINUED | OUTPATIENT
Start: 2022-04-20 | End: 2022-04-24 | Stop reason: HOSPADM

## 2022-04-20 RX ORDER — POTASSIUM CHLORIDE 750 MG/1
10 TABLET, FILM COATED, EXTENDED RELEASE ORAL DAILY
Status: DISCONTINUED | OUTPATIENT
Start: 2022-04-21 | End: 2022-04-22

## 2022-04-20 RX ORDER — BUMETANIDE 0.25 MG/ML
2 INJECTION, SOLUTION INTRAMUSCULAR; INTRAVENOUS
Status: DISCONTINUED | OUTPATIENT
Start: 2022-04-20 | End: 2022-04-23

## 2022-04-20 RX ORDER — HYDRALAZINE HYDROCHLORIDE 25 MG/1
25 TABLET, FILM COATED ORAL EVERY 8 HOURS SCHEDULED
Status: DISCONTINUED | OUTPATIENT
Start: 2022-04-20 | End: 2022-04-24 | Stop reason: HOSPADM

## 2022-04-20 RX ORDER — ACETAMINOPHEN 325 MG/1
650 TABLET ORAL EVERY 6 HOURS PRN
Status: DISCONTINUED | OUTPATIENT
Start: 2022-04-20 | End: 2022-04-24 | Stop reason: HOSPADM

## 2022-04-20 RX ORDER — OXYCODONE AND ACETAMINOPHEN 10; 325 MG/1; MG/1
1 TABLET ORAL EVERY 8 HOURS PRN
Status: DISCONTINUED | OUTPATIENT
Start: 2022-04-20 | End: 2022-04-24 | Stop reason: HOSPADM

## 2022-04-20 RX ORDER — POTASSIUM CHLORIDE 20 MEQ/1
40 TABLET, EXTENDED RELEASE ORAL PRN
Status: DISCONTINUED | OUTPATIENT
Start: 2022-04-20 | End: 2022-04-24 | Stop reason: HOSPADM

## 2022-04-20 RX ORDER — LANOLIN ALCOHOL/MO/W.PET/CERES
4.5 CREAM (GRAM) TOPICAL NIGHTLY PRN
Status: DISCONTINUED | OUTPATIENT
Start: 2022-04-20 | End: 2022-04-24 | Stop reason: HOSPADM

## 2022-04-20 RX ORDER — ASPIRIN 81 MG/1
81 TABLET ORAL DAILY
Status: DISCONTINUED | OUTPATIENT
Start: 2022-04-21 | End: 2022-04-24 | Stop reason: HOSPADM

## 2022-04-20 RX ORDER — GABAPENTIN 300 MG/1
300 CAPSULE ORAL NIGHTLY
Status: DISCONTINUED | OUTPATIENT
Start: 2022-04-20 | End: 2022-04-24 | Stop reason: HOSPADM

## 2022-04-20 RX ORDER — DOXEPIN HYDROCHLORIDE 50 MG/1
50 CAPSULE ORAL NIGHTLY
Status: DISCONTINUED | OUTPATIENT
Start: 2022-04-20 | End: 2022-04-24 | Stop reason: HOSPADM

## 2022-04-20 RX ORDER — ROPINIROLE 1 MG/1
1 TABLET, FILM COATED ORAL NIGHTLY
Status: DISCONTINUED | OUTPATIENT
Start: 2022-04-20 | End: 2022-04-24 | Stop reason: HOSPADM

## 2022-04-20 RX ORDER — SODIUM CHLORIDE 0.9 % (FLUSH) 0.9 %
10 SYRINGE (ML) INJECTION PRN
Status: DISCONTINUED | OUTPATIENT
Start: 2022-04-20 | End: 2022-04-24 | Stop reason: HOSPADM

## 2022-04-20 RX ORDER — CARVEDILOL 25 MG/1
50 TABLET ORAL 2 TIMES DAILY
Status: DISCONTINUED | OUTPATIENT
Start: 2022-04-20 | End: 2022-04-24 | Stop reason: HOSPADM

## 2022-04-20 RX ORDER — ACETAMINOPHEN 650 MG/1
650 SUPPOSITORY RECTAL EVERY 6 HOURS PRN
Status: DISCONTINUED | OUTPATIENT
Start: 2022-04-20 | End: 2022-04-24 | Stop reason: HOSPADM

## 2022-04-20 RX ORDER — AMLODIPINE BESYLATE 10 MG/1
10 TABLET ORAL DAILY
Status: DISCONTINUED | OUTPATIENT
Start: 2022-04-21 | End: 2022-04-24 | Stop reason: HOSPADM

## 2022-04-20 RX ORDER — ENOXAPARIN SODIUM 100 MG/ML
40 INJECTION SUBCUTANEOUS 2 TIMES DAILY
Status: DISCONTINUED | OUTPATIENT
Start: 2022-04-20 | End: 2022-04-24 | Stop reason: HOSPADM

## 2022-04-20 RX ORDER — POTASSIUM CHLORIDE 20 MEQ/1
40 TABLET, EXTENDED RELEASE ORAL ONCE
Status: COMPLETED | OUTPATIENT
Start: 2022-04-20 | End: 2022-04-20

## 2022-04-20 RX ADMIN — IPRATROPIUM BROMIDE AND ALBUTEROL SULFATE 3 ML: .5; 3 SOLUTION RESPIRATORY (INHALATION) at 21:55

## 2022-04-20 RX ADMIN — OXYBUTYNIN CHLORIDE 10 MG: 10 TABLET, EXTENDED RELEASE ORAL at 21:46

## 2022-04-20 RX ADMIN — HYDRALAZINE HYDROCHLORIDE 25 MG: 25 TABLET, FILM COATED ORAL at 21:47

## 2022-04-20 RX ADMIN — BUMETANIDE 2 MG: 0.25 INJECTION INTRAMUSCULAR; INTRAVENOUS at 18:25

## 2022-04-20 RX ADMIN — Medication 4.5 MG: at 21:47

## 2022-04-20 RX ADMIN — MOMETASONE FUROATE AND FORMOTEROL FUMARATE DIHYDRATE 2 PUFF: 200; 5 AEROSOL RESPIRATORY (INHALATION) at 22:03

## 2022-04-20 RX ADMIN — MONTELUKAST SODIUM 10 MG: 10 TABLET, FILM COATED ORAL at 21:46

## 2022-04-20 RX ADMIN — TAMSULOSIN HYDROCHLORIDE 0.4 MG: 0.4 CAPSULE ORAL at 21:46

## 2022-04-20 RX ADMIN — CARVEDILOL 50 MG: 25 TABLET, FILM COATED ORAL at 21:46

## 2022-04-20 RX ADMIN — POTASSIUM CHLORIDE 40 MEQ: 20 TABLET, EXTENDED RELEASE ORAL at 21:47

## 2022-04-20 RX ADMIN — DOXEPIN HYDROCHLORIDE 50 MG: 50 CAPSULE ORAL at 21:47

## 2022-04-20 RX ADMIN — ROPINIROLE HYDROCHLORIDE 1 MG: 1 TABLET, FILM COATED ORAL at 21:46

## 2022-04-20 RX ADMIN — SODIUM CHLORIDE, PRESERVATIVE FREE 10 ML: 5 INJECTION INTRAVENOUS at 21:48

## 2022-04-20 RX ADMIN — FUROSEMIDE 40 MG: 10 INJECTION, SOLUTION INTRAMUSCULAR; INTRAVENOUS at 14:29

## 2022-04-20 RX ADMIN — ENOXAPARIN SODIUM 40 MG: 100 INJECTION SUBCUTANEOUS at 21:48

## 2022-04-20 RX ADMIN — OXYCODONE AND ACETAMINOPHEN 1 TABLET: 10; 325 TABLET ORAL at 21:48

## 2022-04-20 RX ADMIN — GABAPENTIN 300 MG: 300 CAPSULE ORAL at 21:46

## 2022-04-20 ASSESSMENT — PAIN DESCRIPTION - LOCATION
LOCATION: BACK
LOCATION: BACK;FOOT;LEG
LOCATION: BACK

## 2022-04-20 ASSESSMENT — ENCOUNTER SYMPTOMS
SHORTNESS OF BREATH: 1
SORE THROAT: 0
ALLERGIC/IMMUNOLOGIC NEGATIVE: 1
ABDOMINAL PAIN: 0
CHEST TIGHTNESS: 0
NAUSEA: 0
STRIDOR: 0
COUGH: 0
RHINORRHEA: 0
CHEST TIGHTNESS: 0
WHEEZING: 0
DIARRHEA: 0
VOMITING: 0
EYES NEGATIVE: 1
COUGH: 0
SHORTNESS OF BREATH: 1
WHEEZING: 0
ABDOMINAL DISTENTION: 0
NAUSEA: 0
BACK PAIN: 0

## 2022-04-20 ASSESSMENT — PAIN DESCRIPTION - FREQUENCY
FREQUENCY: CONTINUOUS
FREQUENCY: INTERMITTENT

## 2022-04-20 ASSESSMENT — PAIN DESCRIPTION - PAIN TYPE
TYPE: CHRONIC PAIN
TYPE: ACUTE PAIN;CHRONIC PAIN
TYPE: CHRONIC PAIN

## 2022-04-20 ASSESSMENT — PAIN SCALES - GENERAL
PAINLEVEL_OUTOF10: 8
PAINLEVEL_OUTOF10: 7
PAINLEVEL_OUTOF10: 8
PAINLEVEL_OUTOF10: 8

## 2022-04-20 ASSESSMENT — PAIN DESCRIPTION - DESCRIPTORS
DESCRIPTORS: ACHING
DESCRIPTORS: ACHING

## 2022-04-20 ASSESSMENT — PAIN - FUNCTIONAL ASSESSMENT
PAIN_FUNCTIONAL_ASSESSMENT: PREVENTS OR INTERFERES SOME ACTIVE ACTIVITIES AND ADLS
PAIN_FUNCTIONAL_ASSESSMENT: 0-10
PAIN_FUNCTIONAL_ASSESSMENT: NONE - DENIES PAIN

## 2022-04-20 ASSESSMENT — PAIN DESCRIPTION - ORIENTATION
ORIENTATION: LOWER
ORIENTATION: LOWER
ORIENTATION: RIGHT;LEFT

## 2022-04-20 ASSESSMENT — PAIN DESCRIPTION - ONSET: ONSET: ON-GOING

## 2022-04-20 NOTE — H&P
Hospitalist - History & Physical      Patient: Sharmila Villarreal    Unit/Bed:6K-25/025-A  YOB: 1960  MRN: [id number]   Acct: [de-identified]   PCP: Ricky Hilton DO    Date of Service: Pt seen/examined on 04/20/22  and Admitted to Inpatient with expected LOS greater than two midnights due to medical therapy. Chief Complaint: Shortness of breath    Assessment and Plan:  1. Acute hypoxic respiratory failure:   Pt requiring 4-5L O2 NC, up from baseline of RA. Pt endorses worsening SOB for last several months, significantly worse in last week. Overall, pt appears stable, afebrile, non-toxic appearing without an acute leukocytosis. CXR reveals mod. Cardiomegaly, small bilat pleural effusions, and mild interstitial edema bilaterally. Labs remarkable for elevated BNP, elevated troponin. Suspicious for fluid overload, secondary to acute CHF exacerbation. Last ECHO 5/24/2021 reveals EF 55-60%. Plan to repeat ECHO today. Strict I&Os. 2L fluid restriction, daily weights. Increase home Bumex dose to 2mg BID with meals. Repeat BMP and CBC in the AM.     2. Essential HTN:    Appears overall controlled. Continue home meds. Continue to monitor BP closely. 3. CKD:    Cr. 1.8 today, appears improved from recent baseline. Note pt has been given diuretics for #1. Continue to montior with daily BMP. 4. Hypothyroidism:    Stable. Continue home synthroid. History Of Present Illness:    Viola Noyola is a 53-year-old  male with a past medical history of HTN, LAURIE, hypothyroidism, CHF who presents to [facility] ED today for the evaluation of shortness of breath from sleep clinic. Patient was to be evaluated today for sleep study however was sent over because of increased respiratory effort and SPO2 of 70 to 80%. Patient reports that last week he noted his SPO2 to be in the high 60%'s.   He states that he has noticed increased shortness of breath within the last few months, and increased shortness of breath with exertion. He denies associated chest pain, lightheadedness, vision changes, nausea, vomiting abdominal pain. He reports that he was recently admitted to the hospital for hernia repair, and just had his staples removed. Otherwise he states that he has been compliant with medications and has no other complaints at this time. Patient expresses frustration with his shortness of breath. States that he follows with CHF clinic. Past Medical History:        Diagnosis Date    Arthritis     Back problem     back pain-sees [facility] pain mgmt    Bladder disease     Dr. Leann Barnes CHF with unknown LVEF ([facility] [address] 05/24/2021    Dr. George/CHF clinic    Constipation     Diabetes mellitus ([facility] [address]     Dr. Karly Roman Hypertension     Hypertensive emergency 4/11/2019    Hypertensive urgency 4/13/2019    Sleep apnea     has bipap-sees AZRA Puentes Brain CNP    Thyroid disease        Past Surgical History:        Procedure Laterality Date    ABDOMEN SURGERY      ABDOMEN SURGERY N/A 3/25/2022    ABDOMINAL LYSIS OF ADHESIONS, EXPLANT OF INFECTED MESH performed by Loren Galeano MD at [place] [address]      no stents    CARPAL TUNNEL RELEASE Bilateral     COLONOSCOPY  2017    Dr. Faizan Guzman, [facility]      x3 surgeries with 14 repairs    KNEE SURGERY Right 1980's    cartilage    [place] EXPLORATORY OF ABDOMEN N/A 2/5/2018    ABDOMINAL EXPLORATION WITH LYSIS OF COMPLICATED ADHESIONS WITH AN EXTENDED RIGHT COLON RESECTION performed by Loren Galeano MD at [address] [place] [address] Medications:   No current facility-administered medications on file prior to encounter. Current Outpatient Medications on File Prior to Encounter   Medication Sig Dispense Refill    bumetanide (BUMEX) 1 MG tablet Take 2 tablets by mouth daily AND 1 tablet as needed.  60 tablet 6    potassium chloride (K-TAB) 10 MEQ extended release tablet Take 1 tablet by mouth daily 60 tablet 3    oxyCODONE-acetaminophen (PERCOCET)  MG per tablet Take 1 tablet by mouth every 8 hours as needed for Pain for up to 30 days. Intended supply: 30 days 90 tablet 0    gabapentin (NEURONTIN) 300 MG capsule Take 1 capsule by mouth nightly for 30 days.  30 capsule 0    tamsulosin (FLOMAX) 0.4 MG capsule Take 1 capsule by mouth nightly 90 capsule 3    oxybutynin (DITROPAN XL) 10 MG extended release tablet Take 1 tablet by mouth daily 90 tablet 3    carvedilol (COREG) 25 MG tablet Take 2 tablets by mouth 2 times daily 60 tablet 3    hydrALAZINE (APRESOLINE) 25 MG tablet Take 1 tablet by mouth every 8 hours 90 tablet 3    levothyroxine (SYNTHROID) 175 MCG tablet Take 1 tablet by mouth Daily 30 tablet 3    amLODIPine (NORVASC) 10 MG tablet Take 1 tablet by mouth daily 30 tablet 3    melatonin 3 MG TABS tablet Take 1.5 tablets by mouth nightly as needed (Sleep) 45 tablet 3    ipratropium-albuterol (DUONEB) 0.5-2.5 (3) MG/3ML SOLN nebulizer solution Inhale 3 mLs into the lungs every 4 hours as needed for Shortness of Breath 360 mL 1    Fluticasone furoate-vilanterol (BREO ELLIPTA) 200-25 MCG/INH AEPB inhaler Inhale 1 puff into the lungs daily 3 each 3    montelukast (SINGULAIR) 10 MG tablet Take 1 tablet by mouth nightly 90 tablet 3    albuterol sulfate  (90 Base) MCG/ACT inhaler Inhale 2 puffs into the lungs every 6 hours as needed for Wheezing or Shortness of Breath 1 each 5    CPAP Machine MISC by Does not apply route Please change bipap 17/13 cm H20. 1 each 0    rOPINIRole (REQUIP) 1 MG tablet Take 1 pill in afternoon and 2 at bedtime 270 tablet 1    doxepin (SINEQUAN) 25 MG capsule Take 2 capsules by mouth nightly 60 capsule 3    ibuprofen (ADVIL;MOTRIN) 200 MG tablet Take 400 mg by mouth every 6 hours as needed for Pain      aspirin EC 81 MG EC tablet Take 1 tablet by mouth daily 90 tablet 1    nitroGLYCERIN (NITROSTAT) 0.4 MG SL tablet Place 1 tablet under the tongue every 5 minutes as needed for Chest pain (up to 3 doses) 25 tablet 3    glimepiride (AMARYL) 4 MG tablet Take 4 mg by mouth daily       azelastine (ASTELIN) 137 MCG/SPRAY nasal spray 1 spray by Nasal route as needed. Use in each nostril as directed         Allergies:    Shellfish-derived products, Pcn [penicillins], Succinylcholine, Sulfa antibiotics, Morphine, and Tape [adhesive tape]    Social History:    reports that he has quit smoking. His smoking use included pipe. His smokeless tobacco use includes chew. He reports that he does not drink alcohol and does not use drugs. Family History:       Problem Relation Age of Onset    Cancer Mother         breast    Heart Disease Father         bladder, lung    Cancer Father     Stroke Brother     Heart Attack Brother     Prostate Cancer Brother     Diabetes Paternal Grandmother     Arthritis Brother     High Blood Pressure Neg Hx        Diet:  ADULT DIET; Regular; 3 carb choices (45 gm/meal); Low Sodium (2 gm); 2000 ml    Review of systems:     Review of Systems   Constitutional: Positive for activity change and fatigue. Negative for appetite change, chills, fever and unexpected weight change. HENT: Negative for congestion, rhinorrhea and sore throat. Respiratory: Positive for shortness of breath. Negative for cough, chest tightness and wheezing. Cardiovascular: Negative for chest pain, palpitations and leg swelling. Gastrointestinal: Negative for abdominal distention, abdominal pain, nausea and vomiting. Neurological: Negative for dizziness, weakness and light-headedness. PHYSICAL EXAM:  BP (!) 158/86   Pulse 66   Temp 97.7 °F (36.5 °C) (Oral)   Resp 18   Ht 6' 1\" (1.854 m)   Wt (!) 330 lb 9.6 oz (150 kg)   SpO2 97%   BMI 43.62 kg/m²   General appearance: No apparent distress. Appears stated age and cooperative. Skin: Skin color, texture, turgor normal.  No rashes or lesions. HEENT: Normal cephalic, atraumatic without obvious deformity.  Pupils equal, round, and reactive to light. Extra-ocular muscles intact. Conjunctivae/corneas clear. Neck: Trachea midline. Supple, with full range of motion. No jugular venous distention. Cardiovascular: Diminished heart sounds. Regular rate and rhythm with normal S1/S2. No murmurs, rubs or gallops. Respiratory:  Normal respiratory effort. Clear to auscultation, bilaterally without rales, wheezes, or rhonchi. Abdomen: Soft, non-tender, non-distended. Normal bowel sounds. Musculoskeletal: Scant lower extremity edema noted bilaterally. No weakness or instability noted. No erythema, or gross deformity noted. Vascular:  Pulses +2 palpable, equal bilaterally. Neurologic:  Neurovascularly intact without any focal sensory/motor deficits. Cranial nerves: II-XII grossly intact. Psychiatric: Alert and oriented, thought content appropriate, normal insight      Labs:   Recent Labs     04/20/22  1250   WBC 6.8   HGB 8.2*   HCT 27.3*        Recent Labs     04/20/22  1250      K 3.1*      CO2 25   BUN 19   CREATININE 1.8*   CALCIUM 8.2*     Recent Labs     04/20/22  1250   AST 16   ALT 17   BILIDIR <0.2   BILITOT 0.5   ALKPHOS 104     No results for input(s): INR in the last 72 hours. No results for input(s): Chris Bryan in the last 72 hours. Urinalysis:    Lab Results   Component Value Date    NITRU NEGATIVE 03/23/2022    WBCUA 5-9 03/23/2022    BACTERIA NONE SEEN 03/23/2022    RBCUA 25-50 03/23/2022    BLOODU MODERATE 03/23/2022    SPECGRAV 1.015 03/23/2022    GLUCOSEU 500 04/13/2021       Radiology:   XR CHEST PORTABLE   Final Result   1. Moderate cardiomegaly. Tiny bilateral pleural effusions. 2. Mild interstitial pneumonia/edema both mid and lower lung fields. Cannot exclude incipient CHF. **This report has been created using voice recognition software. It may contain minor errors which are inherent in voice recognition technology. **      Final report electronically signed by Dr. Acosta Both on 4/20/2022 1:19 PM        XR CHEST PORTABLE    Result Date: 4/20/2022  PROCEDURE: XR CHEST PORTABLE CLINICAL INFORMATION: sob COMPARISON: 3/18/2022 TECHNIQUE: A single mobile view of the chest was obtained. 1. Moderate cardiomegaly. Tiny bilateral pleural effusions. 2. Mild interstitial pneumonia/edema both mid and lower lung fields. Cannot exclude incipient CHF. **This report has been created using voice recognition software. It may contain minor errors which are inherent in voice recognition technology. ** Final report electronically signed by Dr. Acosta Both on 4/20/2022 1:19 PM        EKG: Sinus rhythm with occasional PVCs. Right bundle branch block. Nonspecific ST and T wave abnormalities.     Electronically signed by Akira Rojas PA-C on 4/20/2022 at 7:32 PM

## 2022-04-20 NOTE — ED NOTES
Pt transported to [facility] on cart in stable condition. Floor contacted before transport. Spoke with Jessica Sweeney.      Ivis Ortega  04/20/22 [id number]

## 2022-04-20 NOTE — Clinical Note
Discharge Plan[de-identified] Other/[facility])   Telemetry/Cardiac Monitoring Required?: Yes

## 2022-04-20 NOTE — ED PROVIDER NOTES
Antwan N[facility] COMPLAINT       Chief Complaint   Patient presents with    Shortness of Breath       Nurses Notes reviewed and I agree except as noted in the HPI. HISTORY OF PRESENT ILLNESS    David Ely is a 63 y.o. male. This patient was sent directly down to us from the sleep lab where he was found to be markedly hypoxemic and too short of breath for them to proceed with any testing today. He does report a prior history of CHF. Does not have any pain currently. REVIEW OF SYSTEMS         No fever, has had some coughing, no abdominal pain    Remainder of review of systems is otherwise reviewed as negative. PAST MEDICAL HISTORY    has a past medical history of Arthritis, Back problem, Bladder disease, CHF with unknown LVEF ([address] Constipation, Diabetes mellitus ([address] Hypertension, Hypertensive emergency, Hypertensive urgency, Sleep apnea, and Thyroid disease. SURGICAL HISTORY      has a past surgical history that includes Appendectomy; knee surgery (Right, 1980's); Carpal tunnel release (Bilateral); Colonoscopy (2017); hernia repair; pr exploratory of abdomen (N/A, 2/5/2018); Dilatation, esophagus; Abdomen surgery; Cardiac catheterization; and Abdomen surgery (N/A, 3/25/2022). CURRENT MEDICATIONS       Previous Medications    ALBUTEROL SULFATE  (90 BASE) MCG/ACT INHALER    Inhale 2 puffs into the lungs every 6 hours as needed for Wheezing or Shortness of Breath    AMLODIPINE (NORVASC) 10 MG TABLET    Take 1 tablet by mouth daily    ASPIRIN EC 81 MG EC TABLET    Take 1 tablet by mouth daily    AZELASTINE (ASTELIN) 137 MCG/SPRAY NASAL SPRAY    1 spray by Nasal route as needed. Use in each nostril as directed    BUMETANIDE (BUMEX) 1 MG TABLET    Take 2 tablets by mouth daily AND 1 tablet as needed.     CARVEDILOL (COREG) 25 MG TABLET    Take 2 tablets by mouth 2 times daily    CPAP MACHINE MISC    by Does not apply route Please change bipap 17/13 cm H20. DOXEPIN (SINEQUAN) 25 MG CAPSULE    Take 2 capsules by mouth nightly    FLUTICASONE FUROATE-VILANTEROL (BREO ELLIPTA) 200-25 MCG/INH AEPB INHALER    Inhale 1 puff into the lungs daily    GABAPENTIN (NEURONTIN) 300 MG CAPSULE    Take 1 capsule by mouth nightly for 30 days. GLIMEPIRIDE (AMARYL) 4 MG TABLET    Take 4 mg by mouth daily     HYDRALAZINE (APRESOLINE) 25 MG TABLET    Take 1 tablet by mouth every 8 hours    IBUPROFEN (ADVIL;MOTRIN) 200 MG TABLET    Take 400 mg by mouth every 6 hours as needed for Pain    IPRATROPIUM-ALBUTEROL (DUONEB) 0.5-2.5 (3) MG/3ML SOLN NEBULIZER SOLUTION    Inhale 3 mLs into the lungs every 4 hours as needed for Shortness of Breath    LEVOTHYROXINE (SYNTHROID) 175 MCG TABLET    Take 1 tablet by mouth Daily    MELATONIN 3 MG TABS TABLET    Take 1.5 tablets by mouth nightly as needed (Sleep)    MONTELUKAST (SINGULAIR) 10 MG TABLET    Take 1 tablet by mouth nightly    NITROGLYCERIN (NITROSTAT) 0.4 MG SL TABLET    Place 1 tablet under the tongue every 5 minutes as needed for Chest pain (up to 3 doses)    OXYBUTYNIN (DITROPAN XL) 10 MG EXTENDED RELEASE TABLET    Take 1 tablet by mouth daily    OXYCODONE-ACETAMINOPHEN (PERCOCET)  MG PER TABLET    Take 1 tablet by mouth every 8 hours as needed for Pain for up to 30 days. Intended supply: 30 days    POTASSIUM CHLORIDE (K-TAB) 10 MEQ EXTENDED RELEASE TABLET    Take 1 tablet by mouth daily    ROPINIROLE (REQUIP) 1 MG TABLET    Take 1 pill in afternoon and 2 at bedtime    TAMSULOSIN (FLOMAX) 0.4 MG CAPSULE    Take 1 capsule by mouth nightly       ALLERGIES     is allergic to shellfish-derived products, pcn [penicillins], succinylcholine, sulfa antibiotics, morphine, and tape [adhesive tape]. FAMILY HISTORY     He indicated that his mother is . He indicated that his father is . He indicated that all of his four brothers are alive.  He indicated that the status of his paternal grandmother is unknown. He indicated that the status of his neg hx is unknown.   family history includes Arthritis in his brother; Cancer in his father and mother; Diabetes in his paternal grandmother; Heart Attack in his brother; Heart Disease in his father; Prostate Cancer in his brother; Stroke in his brother. SOCIAL HISTORY      reports that he has quit smoking. His smoking use included pipe. His smokeless tobacco use includes chew. He reports that he does not drink alcohol and does not use drugs. PHYSICAL EXAM     INITIAL VITALS:  weight is 335 lb (152 kg) (abnormal). His oral temperature is 98.7 °F (37.1 °C). His blood pressure is 153/89 (abnormal) and his pulse is 75. His respiration is 23 and oxygen saturation is 98%. Constitutional: Somewhat chronically ill-appearing  Eyes:  Pupils are equal and reactive   HENT:  Atraumatic appearing  oropharynx moist, no pharyngeal exudates. Neck- normal range of motion, supple   Respiratory: breath sounds are somewhat distant due to body habitus  Cardiovascular: regular  GI:  Non tender, no rigidity, rebound or guarding  Musculoskeletal:  2/4 distal pulses, no pitting edema  Integument: warm and dry  Neurologic:  Alert & oriented x 3, cranial nerves II through XII are grossly intact. Equal strength in the upper and lower extremities bilaterally. Psychiatric:  Speech and behavior appropriate          DIAGNOSTIC RESULTS     EKG: All EKG's are interpreted by the Emergency Department Physician who either signs or Co-signs this chart in the absence of a cardiologist.  EKG interpreted by me showing sinus rhythm at a rate of 77, PVCs noted. QRS of 106, QTC of 473, axis of 10.     RADIOLOGY: non-plain film images(s) such as CT, Ultrasound and MRI are read by the radiologist.  Chest x-ray interpreted by the radiologist suggesting possible CHF,    LABS:   Labs Reviewed   BASIC METABOLIC PANEL W/ REFLEX TO MG FOR LOW K - Abnormal; Notable for the following components: Result Value    Potassium reflex Magnesium 3.1 (*)     Glucose 125 (*)     CREATININE 1.8 (*)     Calcium 8.2 (*)     All other components within normal limits   BRAIN NATRIURETIC PEPTIDE - Abnormal; Notable for the following components:    Pro-BNP 4743.0 (*)     All other components within normal limits   CBC WITH AUTO DIFFERENTIAL - Abnormal; Notable for the following components:    RBC 3.25 (*)     Hemoglobin 8.2 (*)     Hematocrit 27.3 (*)     MCH 25.2 (*)     MCHC 30.0 (*)     RDW-CV 17.3 (*)     RDW-SD 52.7 (*)     MPV 8.4 (*)     Lymphocytes Absolute 0.5 (*)     All other components within normal limits   TROPONIN - Abnormal; Notable for the following components:    Troponin T 0.019 (*)     All other components within normal limits   BLOOD GAS, ARTERIAL - Abnormal; Notable for the following components:    pH, Blood Gas 7.52 (*)     PCO2 34 (*)     Base Excess (Calculated) 4.5 (*)     All other components within normal limits   HEPATIC FUNCTION PANEL - Abnormal; Notable for the following components:    Albumin 3.0 (*)     All other components within normal limits   GLOMERULAR FILTRATION RATE, ESTIMATED - Abnormal; Notable for the following components:    Est, Glom Filt Rate 39 (*)     All other components within normal limits   COVID-19, RAPID   ANION GAP   MAGNESIUM   OSMOLALITY       EMERGENCY DEPARTMENT COURSE:   Vitals:    Vitals:    04/20/22 1239 04/20/22 1240 04/20/22 1241 04/20/22 1403   BP:   (!) 153/89    Pulse:   75    Resp: (S) (!) 36 (S) (!) 36 24 23   Temp:   98.7 °F (37.1 °C)    TempSrc:   Oral    SpO2: (S) (!) 82% (S) 96% 98% 98%   Weight:   (!) 335 lb (152 kg)      Patient was noted to be markedly hypoxemic and we put him on 5 L.  CHF is notable on the x-ray and beta peptide elevated. I have a message out to the hospitalist to arrange for admission. Lasix ordered.       CRITICAL CARE:   none         FINAL IMPRESSION    Congestive heart failure with hypoxemia    DISPOSITION/PLAN Admitted    DISCHARGE MEDICATIONS:  New Prescriptions    No medications on file       (Please note that portions of this note were completed with a voice recognition program.  Efforts were made to edit the dictations but occasionally words are mis-transcribed.)    Pino Khan, [address] [place] [address] Ric,   04/20/22 1421

## 2022-04-20 NOTE — PROGRESS NOTES
[facility]      Lawrence Delong         [id number]  4/20/2022   Chief Complaint   Patient presents with    3 Month Follow-Up     LAURIE pressure change, increase doxepin increase requip        Pt of Lyn[place]ay    PAP Download:   Original or initial AHI: 49.0     Date of initial study: 9/12/2015      Compliant  93%     Noncompliant 3 %     PAP Type Aircurve 10 Vauto Level  17/13   Avg Hrs/Day 7 hr 53 min  AHI: 0.7   Recorded compliance dates , 3/19/2022  to 4/17/2022   Machine/Mfg:   [x] ResMed    [] Respironics/Dreamstation   Interface:   [] Nasal    [] Nasal pillows   [x] FFM      Provider:      [x] [facility]     [][facility]     [] [facility]    [] Normal    [] [facility]               [] [facility]      [] [facility]    [] [address]      [] Other    Neck Size: 20.75  Mallampati Mallampati 4  ESS:    SAQLI:     Here is a scan of the most recent download:              Presentation:   Kahlil Strong presents for sleep medicine follow up for obstructive sleep apnea however today is complaining of increased AKINS and hypoxemia. He was 77% on arrival to office and with rest increased to 90%. He states he has been in the 70's at home. He was in the hospital for wound and infection recently and discharged about 2 weeks ago. Christa Monetnoreen He has been home about 2 weeks. He is getting fevers every night. He is more SOB the past few days. He is having lightheadedness and feels dizzy. He is not on O2 at home. No pain in legs, no swelling. Progress History:   Since last visit any new medical issues? Yes abd wound  New ER or hospital visits? Yes   Any new or changes in medicines? No  Any new sleep medicines? No    Review of Systems -   Review of Systems   Constitutional: Positive for fever. Negative for activity change, appetite change and chills. HENT: Negative for congestion and postnasal drip. Eyes: Negative. Respiratory: Positive for shortness of breath.  Negative for cough, chest tightness, wheezing and stridor. Cardiovascular: Negative for chest pain and leg swelling. Gastrointestinal: Negative for diarrhea and nausea. Endocrine: Negative. Genitourinary: Negative. Musculoskeletal: Negative. Negative for arthralgias and back pain. Skin: Negative. Allergic/Immunologic: Negative. Neurological: Negative. Negative for dizziness and light-headedness. Psychiatric/Behavioral: Negative. All other systems reviewed and are negative. Physical Exam:    BMI:  Body mass index is 44.25 kg/m². Wt Readings from Last 3 Encounters:   04/20/22 (!) 335 lb 6.4 oz (152.1 kg)   04/19/22 (!) 335 lb (152 kg)   04/14/22 (!) 332 lb 3.2 oz (150.7 kg)     Weight stable / unchanged  Vitals: /80 (Site: Left Upper Arm, Position: Sitting, Cuff Size: Large Adult)   Pulse 77   Temp 97.8 °F (36.6 °C) (Temporal)   Ht 6' 1\" (1.854 m)   Wt (!) 335 lb 6.4 oz (152.1 kg)   SpO2 90% Comment: after sitting for a few minutes  BMI 44.25 kg/m²       Physical Exam  Constitutional:       Appearance: Normal appearance. He is well-developed and normal weight. HENT:      Head: Normocephalic and atraumatic. Right Ear: External ear normal.      Left Ear: External ear normal.      Nose: Nose normal.   Eyes:      Extraocular Movements: Extraocular movements intact. Conjunctiva/sclera: Conjunctivae normal.      Pupils: Pupils are equal, round, and reactive to light. Cardiovascular:      Rate and Rhythm: Normal rate and regular rhythm. Heart sounds: Normal heart sounds. Pulmonary:      Effort: Respiratory distress present. Comments: Diminished in the bases with some consolidation in left base   Musculoskeletal:         General: Normal range of motion. Cervical back: Normal range of motion and neck supple. Skin:     General: Skin is warm and dry. Neurological:      General: No focal deficit present. Mental Status: He is alert and oriented to person, place, and time.    Psychiatric: Attention and Perception: Attention and perception normal.         Mood and Affect: Mood and affect normal.         Speech: Speech normal.         Behavior: Behavior normal. Behavior is cooperative. Thought Content: Thought content normal.         Cognition and Memory: Cognition normal.         Judgment: Judgment normal.         ASSESSMENT/DIAGNOSIS     Diagnosis Orders   1. Acute hypoxemic respiratory failure (HCC)     2. Lightheaded     3. Shortness of breath     4. LAURIE treated with BiPAP     5. Morbid obesity with BMI of 40.0-44.9, adult ([facility] [address]     6. Fever, unspecified fever cause              Plan   - Discussed with patient the need to be evaluated in ER  - Call placed to ER and report given  - Given recent hospitalization, concern for pneumonia vs PE  - Will see him for sleep after acute issues sorted out        Troy Juárez PA-C, MPAS  4/20/2022       Spent 45min with patient and greater than 50% of the time was counseling and coordinating care.

## 2022-04-20 NOTE — PROGRESS NOTES
Pt admitted to  [phone] in a wheelchair. Complaints: Shortness of breath. IV none infusing. IV site free of s/s of infection or infiltration. Vital signs obtained. Assessment and data collection initiated. Two nurse skin assessment performed by Peterson Hickman RN and Kaila Fuentes RN. Oriented to room. Policies and procedures for [facility] explained. Peterson Hickman RN discussed hourly rounding with patient addressing 5 P's. Fall prevention and safety brochure discussed with patient. Bed alarm on. Call light in reach.

## 2022-04-20 NOTE — ED NOTES
ED to inpatient nurses report    Chief Complaint   Patient presents with    Shortness of Breath      Present to ED from home  LOC: alert and orientated to name, place, date  Vital signs   Vitals:    04/20/22 1241 04/20/22 1403 04/20/22 1435 04/20/22 1440   BP: (!) 153/89  (!) 142/82 (!) 143/77   Pulse: 75  68 69   Resp: 24 23 26 19   Temp: 98.7 °F (37.1 °C)      TempSrc: Oral      SpO2: 98% 98% 97% 97%   Weight: (!) 335 lb (152 kg)         Oxygen Baseline room air WNL    Current needs required room air WNL Bipap/Cpap No  LDAs:   Peripheral IV 04/20/22 Right Antecubital (Active)   Site Assessment Clean, dry & intact 04/20/22 1438   Line Status Flushed;Normal saline locked 04/20/22 1438   Phlebitis Assessment No symptoms 04/20/22 1438   Infiltration Assessment 0 04/20/22 1247   Dressing Status New dressing applied;Clean, dry & intact 04/20/22 1247   Dressing Intervention New 04/20/22 1247     Mobility: Requires assistance * 1  Pending ED orders: none at this time  Present condition: patient resting in cot with call light in reach. VSS.        Electronically signed by Gera Vo RN on 4/20/2022 at 3:28 PM     Gera Vo RN  04/20/22 [phone]

## 2022-04-20 NOTE — ED TRIAGE NOTES
Patient presents to the ED from the sleep study lab with Bowie Do for concerns of worsening SOB over the last few weeks and oxygen to be found at room air of 77%. Patient arrives alert and oriented with labored and rapid breathing at 36-40bpm. Oxygen at room air of 82-84%. Patient immediately placed on 5L NC to bring oxygen levels WNL. EKG completed.

## 2022-04-21 PROBLEM — I50.9 ACUTE ON CHRONIC HEART FAILURE (HCC): Status: ACTIVE | Noted: 2022-04-21

## 2022-04-21 LAB
ANION GAP SERPL CALCULATED.3IONS-SCNC: 10 MEQ/L (ref 8–16)
BASOPHILS # BLD: 0.4 %
BASOPHILS ABSOLUTE: 0 THOU/MM3 (ref 0–0.1)
BUN BLDV-MCNC: 21 MG/DL (ref 7–22)
CALCIUM SERPL-MCNC: 7.9 MG/DL (ref 8.5–10.5)
CHLORIDE BLD-SCNC: 102 MEQ/L (ref 98–111)
CO2: 28 MEQ/L (ref 23–33)
CREAT SERPL-MCNC: 2 MG/DL (ref 0.4–1.2)
EOSINOPHIL # BLD: 2.3 %
EOSINOPHILS ABSOLUTE: 0.1 THOU/MM3 (ref 0–0.4)
ERYTHROCYTE [DISTWIDTH] IN BLOOD BY AUTOMATED COUNT: 17.4 % (ref 11.5–14.5)
ERYTHROCYTE [DISTWIDTH] IN BLOOD BY AUTOMATED COUNT: 53.4 FL (ref 35–45)
GFR SERPL CREATININE-BSD FRML MDRD: 34 ML/MIN/1.73M2
GLUCOSE BLD-MCNC: 113 MG/DL (ref 70–108)
GLUCOSE BLD-MCNC: 120 MG/DL (ref 70–108)
GLUCOSE BLD-MCNC: 71 MG/DL (ref 70–108)
GLUCOSE BLD-MCNC: 79 MG/DL (ref 70–108)
HCT VFR BLD CALC: 24.5 % (ref 42–52)
HCT VFR BLD CALC: 25.1 % (ref 42–52)
HEMOGLOBIN: 7.2 GM/DL (ref 14–18)
HEMOGLOBIN: 7.4 GM/DL (ref 14–18)
IMMATURE GRANS (ABS): 0.07 THOU/MM3 (ref 0–0.07)
IMMATURE GRANULOCYTES: 1.3 %
LV EF: 55 %
LVEF MODALITY: NORMAL
LYMPHOCYTES # BLD: 10.4 %
LYMPHOCYTES ABSOLUTE: 0.5 THOU/MM3 (ref 1–4.8)
MAGNESIUM: 1.6 MG/DL (ref 1.6–2.4)
MCH RBC QN AUTO: 25.1 PG (ref 26–33)
MCHC RBC AUTO-ENTMCNC: 29.4 GM/DL (ref 32.2–35.5)
MCV RBC AUTO: 85.4 FL (ref 80–94)
MONOCYTES # BLD: 11.1 %
MONOCYTES ABSOLUTE: 0.6 THOU/MM3 (ref 0.4–1.3)
NUCLEATED RED BLOOD CELLS: 0 /100 WBC
PLATELET # BLD: 202 THOU/MM3 (ref 130–400)
PMV BLD AUTO: 7.9 FL (ref 9.4–12.4)
POTASSIUM REFLEX MAGNESIUM: 3 MEQ/L (ref 3.5–5.2)
RBC # BLD: 2.87 MILL/MM3 (ref 4.7–6.1)
SEG NEUTROPHILS: 74.5 %
SEGMENTED NEUTROPHILS ABSOLUTE COUNT: 3.9 THOU/MM3 (ref 1.8–7.7)
SODIUM BLD-SCNC: 140 MEQ/L (ref 135–145)
TROPONIN T: 0.02 NG/ML
WBC # BLD: 5.2 THOU/MM3 (ref 4.8–10.8)

## 2022-04-21 PROCEDURE — 6370000000 HC RX 637 (ALT 250 FOR IP)

## 2022-04-21 PROCEDURE — 94660 CPAP INITIATION&MGMT: CPT

## 2022-04-21 PROCEDURE — 6360000002 HC RX W HCPCS

## 2022-04-21 PROCEDURE — 83735 ASSAY OF MAGNESIUM: CPT

## 2022-04-21 PROCEDURE — 99232 SBSQ HOSP IP/OBS MODERATE 35: CPT | Performed by: FAMILY MEDICINE

## 2022-04-21 PROCEDURE — 94761 N-INVAS EAR/PLS OXIMETRY MLT: CPT

## 2022-04-21 PROCEDURE — 1200000003 HC TELEMETRY R&B

## 2022-04-21 PROCEDURE — 94640 AIRWAY INHALATION TREATMENT: CPT

## 2022-04-21 PROCEDURE — 80048 BASIC METABOLIC PNL TOTAL CA: CPT

## 2022-04-21 PROCEDURE — 36415 COLL VENOUS BLD VENIPUNCTURE: CPT

## 2022-04-21 PROCEDURE — 2580000003 HC RX 258

## 2022-04-21 PROCEDURE — 2700000000 HC OXYGEN THERAPY PER DAY

## 2022-04-21 PROCEDURE — 84484 ASSAY OF TROPONIN QUANT: CPT

## 2022-04-21 PROCEDURE — 85025 COMPLETE CBC W/AUTO DIFF WBC: CPT

## 2022-04-21 PROCEDURE — 82948 REAGENT STRIP/BLOOD GLUCOSE: CPT

## 2022-04-21 PROCEDURE — 93306 TTE W/DOPPLER COMPLETE: CPT

## 2022-04-21 PROCEDURE — 2500000003 HC RX 250 WO HCPCS

## 2022-04-21 PROCEDURE — 85014 HEMATOCRIT: CPT

## 2022-04-21 PROCEDURE — 85018 HEMOGLOBIN: CPT

## 2022-04-21 RX ORDER — INSULIN LISPRO 100 [IU]/ML
0-6 INJECTION, SOLUTION INTRAVENOUS; SUBCUTANEOUS
Status: DISCONTINUED | OUTPATIENT
Start: 2022-04-21 | End: 2022-04-24 | Stop reason: HOSPADM

## 2022-04-21 RX ORDER — DEXTROSE MONOHYDRATE 25 G/50ML
12.5 INJECTION, SOLUTION INTRAVENOUS PRN
Status: DISCONTINUED | OUTPATIENT
Start: 2022-04-21 | End: 2022-04-21

## 2022-04-21 RX ORDER — INSULIN LISPRO 100 [IU]/ML
0-3 INJECTION, SOLUTION INTRAVENOUS; SUBCUTANEOUS NIGHTLY
Status: DISCONTINUED | OUTPATIENT
Start: 2022-04-21 | End: 2022-04-24 | Stop reason: HOSPADM

## 2022-04-21 RX ORDER — DEXTROSE MONOHYDRATE 50 MG/ML
100 INJECTION, SOLUTION INTRAVENOUS PRN
Status: DISCONTINUED | OUTPATIENT
Start: 2022-04-21 | End: 2022-04-24 | Stop reason: HOSPADM

## 2022-04-21 RX ORDER — NICOTINE POLACRILEX 4 MG
15 LOZENGE BUCCAL PRN
Status: DISCONTINUED | OUTPATIENT
Start: 2022-04-21 | End: 2022-04-21

## 2022-04-21 RX ADMIN — OXYCODONE AND ACETAMINOPHEN 1 TABLET: 10; 325 TABLET ORAL at 14:22

## 2022-04-21 RX ADMIN — IPRATROPIUM BROMIDE AND ALBUTEROL SULFATE 3 ML: .5; 3 SOLUTION RESPIRATORY (INHALATION) at 12:29

## 2022-04-21 RX ADMIN — IPRATROPIUM BROMIDE AND ALBUTEROL SULFATE 3 ML: .5; 3 SOLUTION RESPIRATORY (INHALATION) at 07:51

## 2022-04-21 RX ADMIN — GABAPENTIN 300 MG: 300 CAPSULE ORAL at 22:37

## 2022-04-21 RX ADMIN — MONTELUKAST SODIUM 10 MG: 10 TABLET, FILM COATED ORAL at 22:38

## 2022-04-21 RX ADMIN — IPRATROPIUM BROMIDE AND ALBUTEROL SULFATE 3 ML: .5; 3 SOLUTION RESPIRATORY (INHALATION) at 17:00

## 2022-04-21 RX ADMIN — LEVOTHYROXINE SODIUM 175 MCG: 0.15 TABLET ORAL at 05:39

## 2022-04-21 RX ADMIN — BUMETANIDE 2 MG: 0.25 INJECTION INTRAMUSCULAR; INTRAVENOUS at 16:35

## 2022-04-21 RX ADMIN — SODIUM CHLORIDE, PRESERVATIVE FREE 10 ML: 5 INJECTION INTRAVENOUS at 08:37

## 2022-04-21 RX ADMIN — TAMSULOSIN HYDROCHLORIDE 0.4 MG: 0.4 CAPSULE ORAL at 22:38

## 2022-04-21 RX ADMIN — HYDRALAZINE HYDROCHLORIDE 25 MG: 25 TABLET, FILM COATED ORAL at 14:22

## 2022-04-21 RX ADMIN — CARVEDILOL 50 MG: 25 TABLET, FILM COATED ORAL at 22:37

## 2022-04-21 RX ADMIN — AMLODIPINE BESYLATE 10 MG: 10 TABLET ORAL at 08:36

## 2022-04-21 RX ADMIN — ASPIRIN 81 MG: 81 TABLET, COATED ORAL at 08:36

## 2022-04-21 RX ADMIN — SODIUM CHLORIDE, PRESERVATIVE FREE 10 ML: 5 INJECTION INTRAVENOUS at 22:38

## 2022-04-21 RX ADMIN — MOMETASONE FUROATE AND FORMOTEROL FUMARATE DIHYDRATE 2 PUFF: 200; 5 AEROSOL RESPIRATORY (INHALATION) at 21:25

## 2022-04-21 RX ADMIN — IPRATROPIUM BROMIDE AND ALBUTEROL SULFATE 3 ML: .5; 3 SOLUTION RESPIRATORY (INHALATION) at 21:24

## 2022-04-21 RX ADMIN — OXYCODONE AND ACETAMINOPHEN 1 TABLET: 10; 325 TABLET ORAL at 05:39

## 2022-04-21 RX ADMIN — ENOXAPARIN SODIUM 40 MG: 100 INJECTION SUBCUTANEOUS at 08:35

## 2022-04-21 RX ADMIN — DOXEPIN HYDROCHLORIDE 50 MG: 50 CAPSULE ORAL at 22:38

## 2022-04-21 RX ADMIN — POTASSIUM CHLORIDE 40 MEQ: 1500 TABLET, EXTENDED RELEASE ORAL at 08:36

## 2022-04-21 RX ADMIN — POTASSIUM CHLORIDE 10 MEQ: 750 TABLET, EXTENDED RELEASE ORAL at 08:36

## 2022-04-21 RX ADMIN — CARVEDILOL 50 MG: 25 TABLET, FILM COATED ORAL at 08:36

## 2022-04-21 RX ADMIN — BUMETANIDE 2 MG: 0.25 INJECTION INTRAMUSCULAR; INTRAVENOUS at 05:39

## 2022-04-21 RX ADMIN — HYDRALAZINE HYDROCHLORIDE 25 MG: 25 TABLET, FILM COATED ORAL at 22:38

## 2022-04-21 RX ADMIN — HYDRALAZINE HYDROCHLORIDE 25 MG: 25 TABLET, FILM COATED ORAL at 05:39

## 2022-04-21 RX ADMIN — OXYBUTYNIN CHLORIDE 10 MG: 10 TABLET, EXTENDED RELEASE ORAL at 22:38

## 2022-04-21 RX ADMIN — MOMETASONE FUROATE AND FORMOTEROL FUMARATE DIHYDRATE 2 PUFF: 200; 5 AEROSOL RESPIRATORY (INHALATION) at 07:51

## 2022-04-21 RX ADMIN — OXYCODONE AND ACETAMINOPHEN 1 TABLET: 10; 325 TABLET ORAL at 22:37

## 2022-04-21 RX ADMIN — ROPINIROLE HYDROCHLORIDE 1 MG: 1 TABLET, FILM COATED ORAL at 22:38

## 2022-04-21 ASSESSMENT — PAIN SCALES - GENERAL
PAINLEVEL_OUTOF10: 3
PAINLEVEL_OUTOF10: 7
PAINLEVEL_OUTOF10: 7
PAINLEVEL_OUTOF10: 8
PAINLEVEL_OUTOF10: 3
PAINLEVEL_OUTOF10: 0
PAINLEVEL_OUTOF10: 8

## 2022-04-21 ASSESSMENT — PAIN DESCRIPTION - LOCATION
LOCATION: BACK

## 2022-04-21 ASSESSMENT — PAIN DESCRIPTION - DESCRIPTORS
DESCRIPTORS: ACHING

## 2022-04-21 ASSESSMENT — PAIN DESCRIPTION - PAIN TYPE: TYPE: CHRONIC PAIN

## 2022-04-21 ASSESSMENT — PAIN DESCRIPTION - ORIENTATION
ORIENTATION: LOWER
ORIENTATION: LOWER

## 2022-04-21 NOTE — PLAN OF CARE
Problem: Respiratory - Adult  Goal: Achieves optimal ventilation and oxygenation  4/21/2022 0757 by Roxann Aranda RCP  Outcome: Progressing      Pt is off his bipap and maintain adequate oxygen saturation at 94% on 2L nc

## 2022-04-21 NOTE — PROGRESS NOTES
PROGRESS NOTE      Patient:  Heavenly Huber      Unit/Bed:[facility], [facility]25/025-A    YOB: 1960    MRN: [id number]       Acct: [de-identified]     PCP: Taz Meza DO    Date of Admission: 4/20/2022      Assessment/Plan:    Anticipated Discharge in : 1-2 days    Active Hospital Problems    Diagnosis Date Noted    Acute hypoxemic respiratory failure ([address] [J96.01] 04/20/2022     Priority: Medium     Acute hypoxic respiratory failure  Patient with progressive SOB on admission. SpO2 had been noted in the 70-80s prior to admission. On room air at baseline. Likely related to underlying Acute CHF exacerbation  - Continue weaning supplemental O2 as tolerated to maintain SpO2 >90%  - Currently requiring 2L NC  - Continue treatment for Acute CHF exacerbation as below. Acute on Chronic HFpEF with exacerbation  Echo 5/24/22 with EF 55-60%, moderate concentric LVH. Patient follows with CHF clinic outpatient. BNP 4743 on admission. CXR shows bilateral interstitial edema. Urine output 1.8 L since admission  - Continue IV Bumex 2 mg BID currently  - Continue home Coreg  - Strict I&Os  - Fluid Restriction  - Daily Weights  - Repeat Echo to be completed today    Normocytic Anemia  Hemoglobin 8.2 on admission, decreased to 7.2 today. Likely etiology Anemia of Chronic Disease, given CKD. Iron studies 3/22 consistent with this. Baseline hemoglobin appears 7.9-8.9 recently, however this is decreased from baseline 12.5 1 year ago. No signs of bleeding currently. - Monitor H&H this afternoon  - Hold Lovenox currently  - No signs of bleeding - continue to monitor closely    Elevated Troponin  Mildly elevated at 0.019 on admission. Patient does have history of elevated troponin in the past. Likely related to underlying renal disease vs. Demand from acute CHF exacerbation. No acute episodes of chest pain.   - Will repeat troponin today  - Monitor clinically    Hypokalemia  Noted on admission.  - Replace per protocol  - Monitor BMP closely with increased dose of Bumex    CKD Stage 3b  Cr appears at baseline on admission (between 2.0 - 2.6). No evidence of CAROLE. - Avoid nephrotoxic medications  - Monitor BMP closely with use of IV diuretics  - Strict I&Os    Non-insulin dependent Type 2 Diabetes Mellitus  Per history. Last A1c documented 9.3% in 5/2021.  - Hold home Amaryl  - POC Glucose checks  - Low dose SSI PRN  - Hypoglycemia protocol in place    HTN  BP stable currently. - Continue home Coreg, Hydralazine, Norvasc     Hypothyroidism  - Per history, continue home dose Synthroid    COPD  - Per history, stable currently. Continue home Singulair, Dulera substituted for Breo, Albuterol PRN, and DuoNebs PRN       Chief Complaint: Shortness of breath    Hospital Course:   Per H&P:  Palak Mejia is a 53-year-old  male with a past medical history of HTN, LAURIE, hypothyroidism, CHF who presents to [facility] ED today for the evaluation of shortness of breath from sleep clinic. Patient was to be evaluated today for sleep study however was sent over because of increased respiratory effort and SPO2 of 70 to 80%. Patient reports that last week he noted his SPO2 to be in the high 60%'s. He states that he has noticed increased shortness of breath within the last few months, and increased shortness of breath with exertion. He denies associated chest pain, lightheadedness, vision changes, nausea, vomiting abdominal pain. He reports that he was recently admitted to the hospital for hernia repair, and just had his staples removed. Otherwise he states that he has been compliant with medications and has no other complaints at this time. Patient expresses frustration with his shortness of breath. States that he follows with CHF clinic. \"    Subjective:  Patient seen and examined this morning. Reports his breathing is significantly improved from prior. He has been weaned to 2L NC, and denies SOB with this.  Patient notes leg swelling has improved since admission. He is receiving Bumex 2 mg IV, with urine output 1850 ml. Patient denies chest pain, palpitations, lightheadedness, abdominal pain, or syncope. No new concerns this morning. Medications:  Reviewed    Infusion Medications    dextrose      sodium chloride       Scheduled Medications    insulin lispro  0-6 Units SubCUTAneous TID WC    insulin lispro  0-3 Units SubCUTAneous Nightly    amLODIPine  10 mg Oral Daily    aspirin EC  81 mg Oral Daily    carvedilol  50 mg Oral BID    doxepin  50 mg Oral Nightly    mometasone-formoterol  2 puff Inhalation BID    gabapentin  300 mg Oral Nightly    hydrALAZINE  25 mg Oral 3 times per day    ipratropium-albuterol  1 vial Inhalation Q4H WA    levothyroxine  175 mcg Oral Daily    montelukast  10 mg Oral Nightly    oxybutynin  10 mg Oral Daily    potassium chloride  10 mEq Oral Daily    rOPINIRole  1 mg Oral Nightly    tamsulosin  0.4 mg Oral Nightly    sodium chloride flush  10 mL IntraVENous 2 times per day    [Held by provider] enoxaparin  40 mg SubCUTAneous BID    bumetanide  2 mg IntraVENous BID AC     PRN Meds: glucagon (rDNA), dextrose, glucose, dextrose bolus (hypoglycemia) **OR** dextrose bolus (hypoglycemia), albuterol sulfate HFA, melatonin, sodium chloride flush, sodium chloride, ondansetron **OR** ondansetron, polyethylene glycol, acetaminophen **OR** acetaminophen, oxyCODONE-acetaminophen, potassium chloride **OR** potassium alternative oral replacement **OR** potassium chloride      Intake/Output Summary (Last 24 hours) at 4/21/2022 1133  Last data filed at 4/21/2022 0830  Gross per 24 hour   Intake 820 ml   Output 2675 ml   Net -1855 ml       Diet:  ADULT DIET; Regular; 3 carb choices (45 gm/meal);  Low Sodium (2 gm); 2000 ml    Exam:  /66   Pulse 69   Temp 98.2 °F (36.8 °C) (Oral)   Resp 18   Ht 6' 1\" (1.854 m)   Wt (!) 329 lb 3.2 oz (149.3 kg)   SpO2 96%   BMI 43.43 kg/m²     General appearance: Alert, Sitting at edge of bed in No apparent distress, appears stated age and cooperative. HEENT: Pupils equal, round, and reactive to light. Conjunctivae/corneas clear. Neck: Supple, with full range of motion. No jugular venous distention. Trachea midline. Respiratory: Scattered crackles heard in bilateral lung bases. No wheezing, rhonchi. Cardiovascular: Regular rate and rhythm with normal S1/S2 without murmurs, rubs or gallops. Abdomen: Soft, non-tender, non-distended with normal bowel sounds. Musculoskeletal: passive and active ROM x 4 extremities. 2+ pitting edema to BLE  Skin: Skin color, texture, turgor normal.  No rashes or lesions. Psychiatric: Alert and oriented, thought content appropriate, normal insight  Capillary Refill: Brisk,< 3 seconds   Peripheral Pulses: +2 palpable, equal bilaterally       Labs:   Recent Labs     04/20/22  1250 04/21/22  0517   WBC 6.8 5.2   HGB 8.2* 7.2*   HCT 27.3* 24.5*    202     Recent Labs     04/20/22  1250 04/21/22  0517    140   K 3.1* 3.0*    102   CO2 25 28   BUN 19 21   CREATININE 1.8* 2.0*   CALCIUM 8.2* 7.9*     Recent Labs     04/20/22  1250   AST 16   ALT 17   BILIDIR <0.2   BILITOT 0.5   ALKPHOS 104     No results for input(s): INR in the last 72 hours. No results for input(s): Violet Shawna in the last 72 hours. Urinalysis:      Lab Results   Component Value Date    NITRU NEGATIVE 03/23/2022    WBCUA 5-9 03/23/2022    BACTERIA NONE SEEN 03/23/2022    RBCUA 25-50 03/23/2022    BLOODU MODERATE 03/23/2022    SPECGRAV 1.015 03/23/2022    GLUCOSEU 500 04/13/2021       Radiology:  XR CHEST PORTABLE   Final Result   1. Moderate cardiomegaly. Tiny bilateral pleural effusions. 2. Mild interstitial pneumonia/edema both mid and lower lung fields. Cannot exclude incipient CHF. **This report has been created using voice recognition software. It may contain minor errors which are inherent in voice recognition technology. **      Final report electronically signed by Dr. Jennifer Jacome on 4/20/2022 1:19 PM          Diet: ADULT DIET; Regular; 3 carb choices (45 gm/meal); Low Sodium (2 gm); 2000 ml    DVT prophylaxis: [x] Lovenox                                 [] SCDs                                 [] SQ Heparin                                  [] Encourage ambulation           [] Already on Anticoagulation     Disposition:    [x] Home       [] TCU       [] Rehab       [] Psych       [] SNF       [] [place]       [] Other-    Code Status: Full Code    PT/OT Eval Status: Not indicated      Electronically signed by Angie Pollock MD on 4/21/2022 at 11:33 AM    This note was electronically signed. Parts of this note may have been dictated by use of voice recognition software and electronically transcribed. The note may contain errors not detected in proofreading. Please refer to my supervising physician's documentation if my documentation differs.

## 2022-04-21 NOTE — CARE COORDINATION
04/21/22 0846   Service Assessment   Patient Orientation Alert and Oriented   Cognition Alert   History Provided By Patient   Primary Caregiver Self   Support Systems Spouse/Significant Other   PCP Verified by CM Yes   Prior Functional Level Independent in ADLs/IADLs   Current Functional Level Independent in ADLs/IADLs   Can patient return to prior living arrangement Yes   Ability to make needs known: Good   Family able to assist with home care needs: Yes   Social/Functional History   Lives With [address] Help From Family   ADL Assistance Independent   Discharge Planning   Type of Residence Independent Living   4/21/22, 8:49 AM EDT  DISCHARGE PLANNING EVALUATION:    Tiara Jimenez       Admitted: 4/20/2022/ [address] day: 1   [facility]: [facility] Reason for admit: Acute hypoxemic respiratory failure (HCC) [J96.01]  Congestive heart failure, unspecified HF chronicity, unspecified heart failure type ([address] [I50.9]   PMH:  has a past medical history of Arthritis, Back problem, Bladder disease, CHF with unknown LVEF ([address] Constipation, Diabetes mellitus ([address] Hypertension, Hypertensive emergency, Hypertensive urgency, Sleep apnea, and Thyroid disease. Procedure:   4/20 1. Moderate cardiomegaly. Tiny bilateral pleural effusions. 2. Mild interstitial pneumonia/edema both mid and lower lung fields. Cannot exclude incipient CHF. Barriers to Discharge:  Presented with sob, required 4 to 5L NC upon admission. Currently on 2L NC, does not use home O2. IV bumex. Nebs. Inhaler. PCP: Bhavya Portillo DO  Readmission Risk Score: 24.9 ( )%    Patient Goals/Plan/Treatment Preferences: Spoke with Javier Orellana, plans to return home with wife, denies needs. Transportation/Food Security/Housekeeping Addressed:  No issues identified.

## 2022-04-21 NOTE — CARE COORDINATION
04/21/22 0853   Readmission Assessment   Number of Days since last admission? 8-30 days   Previous Disposition Home with Family   Who is being Interviewed Patient   What was the patient's/caregiver's perception as to why they think they needed to return back to the hospital? Other (Comment)  (short of breath)   Did you visit your Primary Care Physician after you left the hospital, before you returned this time? Yes   Did you see a specialist, such as Cardiac, Pulmonary, Orthopedic Physician, etc. after you left the hospital? Yes   Who advised the patient to return to the hospital? Self-referral   Does the patient report anything that got in the way of taking their medications? No   In our efforts to provide the best possible care to you and others like you, can you think of anything that we could have done to help you after you left the hospital the first time, so that you might not have needed to return so soon?  Other (Comment)  (denies)

## 2022-04-21 NOTE — PLAN OF CARE
Problem: Discharge Planning  Goal: Discharge to home or other facility with appropriate resources  4/21/2022 1147 by Mercy Cardozo RN  Outcome: Not Progressing  4/21/2022 0208 by Frandy Mendoza RN  Outcome: Progressing  Flowsheets (Taken 4/21/2022 0208)  Discharge to home or other facility with appropriate resources:   Identify discharge learning needs (meds, wound care, etc)   Identify barriers to discharge with patient and caregiver  Note: Patient plans to return home at discharge. No services anticipated at this time     Problem: Pain  Goal: Verbalizes/displays adequate comfort level or baseline comfort level  4/21/2022 1147 by Mercy Cardozo RN  Outcome: Not Progressing  4/21/2022 0208 by Frandy Mendoza RN  Outcome: Progressing  Flowsheets (Taken 4/21/2022 0208)  Verbalizes/displays adequate comfort level or baseline comfort level:   Encourage patient to monitor pain and request assistance   Assess pain using appropriate pain scale   Administer analgesics based on type and severity of pain and evaluate response   Implement non-pharmacological measures as appropriate and evaluate response   Notify Licensed Independent Practitioner if interventions unsuccessful or patient reports new pain  Note: PRN percocet for chronic pain. Provider notified of home dose. Patient satisfied with medication regime and pain control. Problem: Safety - Adult  Goal: Free from fall injury  4/21/2022 1147 by Mercy Cardozo RN  Outcome: Progressing  Flowsheets (Taken 4/21/2022 1147)  Free From Fall Injury: Dalton Peterson family/caregiver on patient safety  Note: Bed alarm on and functioning properly   4/21/2022 0208 by Frandy Mendoza RN  Outcome: Progressing  Flowsheets (Taken 4/21/2022 0208)  Free From Fall Injury: Instruct family/caregiver on patient safety  Note: Call light in reach. Patient aware to ask for help while ambulating. Bed and chair alarm in place.      Problem: Respiratory - Adult  Goal: Achieves optimal ventilation and oxygenation  4/21/2022 1147 by Tootie Whitaker RN  Outcome: Progressing  Flowsheets (Taken 4/21/2022 1147)  Achieves optimal ventilation and oxygenation: Assess for changes in respiratory status  Note: Weaned down from 4 l to 2 l n/c  4/21/2022 0757 by Rekha Morocho RCP  Outcome: Progressing  4/21/2022 0208 by Eugenie Vang RN  Outcome: Progressing  Flowsheets (Taken 4/21/2022 0208)  Achieves optimal ventilation and oxygenation:   Assess for changes in respiratory status   Assess for changes in mentation and behavior   Oxygen supplementation based on oxygen saturation or arterial blood gases   Position to facilitate oxygenation and minimize respiratory effort   Encourage broncho-pulmonary hygiene including cough, deep breathe, incentive spirometry   Respiratory therapy support as indicated  Note: Respiratory therapy following while inpatient. Patient weaning O2 to 2L nasal cannula this shift. CPAP at HS. No c/o SOB. VSS     Problem: Coping  Goal: Pt/Family able to verbalize concerns and demonstrate effective coping strategies  Description: INTERVENTIONS:  1. Assist patient/family to identify coping skills, available support systems and cultural and spiritual values  2. Provide emotional support, including active listening and acknowledgement of concerns of patient and caregivers  3. Reduce environmental stimuli, as able  4. Instruct patient/family in relaxation techniques, as appropriate  5.  Assess for spiritual pain/suffering and initiate Spiritual Care, Psychosocial Clinical Specialist consults as needed  Outcome: Progressing  Flowsheets (Taken 4/21/2022 1147)  Patient/family able to verbalize anxieties, fears, and concerns, and demonstrate effective coping:   Assist patient/family to identify coping skills, available support systems and cultural and spiritual values   Instruct patient/family in relaxation techniques, as appropriate  Note: Worried that he may need o2 at home, listened and comforted    Care plan reviewed with patient and wife. Patient and wife verbalize understanding of the plan of care and contribute to goal setting.

## 2022-04-22 DIAGNOSIS — I50.32 CHF (CONGESTIVE HEART FAILURE), NYHA CLASS II, CHRONIC, DIASTOLIC (HCC): Primary | ICD-10-CM

## 2022-04-22 LAB
ANION GAP SERPL CALCULATED.3IONS-SCNC: 14 MEQ/L (ref 8–16)
BASOPHILS # BLD: 0.2 %
BASOPHILS ABSOLUTE: 0 THOU/MM3 (ref 0–0.1)
BUN BLDV-MCNC: 26 MG/DL (ref 7–22)
CALCIUM SERPL-MCNC: 8.2 MG/DL (ref 8.5–10.5)
CHLORIDE BLD-SCNC: 103 MEQ/L (ref 98–111)
CO2: 26 MEQ/L (ref 23–33)
CREAT SERPL-MCNC: 2.1 MG/DL (ref 0.4–1.2)
EOSINOPHIL # BLD: 2 %
EOSINOPHILS ABSOLUTE: 0.1 THOU/MM3 (ref 0–0.4)
ERYTHROCYTE [DISTWIDTH] IN BLOOD BY AUTOMATED COUNT: 17.7 % (ref 11.5–14.5)
ERYTHROCYTE [DISTWIDTH] IN BLOOD BY AUTOMATED COUNT: 54.4 FL (ref 35–45)
GFR SERPL CREATININE-BSD FRML MDRD: 32 ML/MIN/1.73M2
GLUCOSE BLD-MCNC: 104 MG/DL (ref 70–108)
GLUCOSE BLD-MCNC: 108 MG/DL (ref 70–108)
GLUCOSE BLD-MCNC: 149 MG/DL (ref 70–108)
GLUCOSE BLD-MCNC: 88 MG/DL (ref 70–108)
GLUCOSE BLD-MCNC: 93 MG/DL (ref 70–108)
HCT VFR BLD CALC: 25 % (ref 42–52)
HEMOGLOBIN: 7.3 GM/DL (ref 14–18)
IMMATURE GRANS (ABS): 0.03 THOU/MM3 (ref 0–0.07)
IMMATURE GRANULOCYTES: 0.6 %
LYMPHOCYTES # BLD: 10.8 %
LYMPHOCYTES ABSOLUTE: 0.5 THOU/MM3 (ref 1–4.8)
MAGNESIUM: 1.7 MG/DL (ref 1.6–2.4)
MCH RBC QN AUTO: 24.9 PG (ref 26–33)
MCHC RBC AUTO-ENTMCNC: 29.2 GM/DL (ref 32.2–35.5)
MCV RBC AUTO: 85.3 FL (ref 80–94)
MONOCYTES # BLD: 9.4 %
MONOCYTES ABSOLUTE: 0.5 THOU/MM3 (ref 0.4–1.3)
NUCLEATED RED BLOOD CELLS: 0 /100 WBC
PLATELET # BLD: 219 THOU/MM3 (ref 130–400)
PMV BLD AUTO: 8.6 FL (ref 9.4–12.4)
POTASSIUM REFLEX MAGNESIUM: 3.3 MEQ/L (ref 3.5–5.2)
RBC # BLD: 2.93 MILL/MM3 (ref 4.7–6.1)
SEG NEUTROPHILS: 77 %
SEGMENTED NEUTROPHILS ABSOLUTE COUNT: 3.9 THOU/MM3 (ref 1.8–7.7)
SODIUM BLD-SCNC: 143 MEQ/L (ref 135–145)
WBC # BLD: 5 THOU/MM3 (ref 4.8–10.8)

## 2022-04-22 PROCEDURE — 1200000003 HC TELEMETRY R&B

## 2022-04-22 PROCEDURE — 85025 COMPLETE CBC W/AUTO DIFF WBC: CPT

## 2022-04-22 PROCEDURE — 94761 N-INVAS EAR/PLS OXIMETRY MLT: CPT

## 2022-04-22 PROCEDURE — 80048 BASIC METABOLIC PNL TOTAL CA: CPT

## 2022-04-22 PROCEDURE — 6370000000 HC RX 637 (ALT 250 FOR IP): Performed by: STUDENT IN AN ORGANIZED HEALTH CARE EDUCATION/TRAINING PROGRAM

## 2022-04-22 PROCEDURE — 83735 ASSAY OF MAGNESIUM: CPT

## 2022-04-22 PROCEDURE — 99231 SBSQ HOSP IP/OBS SF/LOW 25: CPT | Performed by: FAMILY MEDICINE

## 2022-04-22 PROCEDURE — 6370000000 HC RX 637 (ALT 250 FOR IP)

## 2022-04-22 PROCEDURE — 6360000002 HC RX W HCPCS

## 2022-04-22 PROCEDURE — 36415 COLL VENOUS BLD VENIPUNCTURE: CPT

## 2022-04-22 PROCEDURE — 94640 AIRWAY INHALATION TREATMENT: CPT

## 2022-04-22 PROCEDURE — 2500000003 HC RX 250 WO HCPCS

## 2022-04-22 PROCEDURE — 82948 REAGENT STRIP/BLOOD GLUCOSE: CPT

## 2022-04-22 PROCEDURE — 2580000003 HC RX 258

## 2022-04-22 PROCEDURE — 2700000000 HC OXYGEN THERAPY PER DAY

## 2022-04-22 PROCEDURE — 93264 REM MNTR WRLS P-ART PRS SNR: CPT | Performed by: NURSE PRACTITIONER

## 2022-04-22 RX ORDER — POTASSIUM CHLORIDE 750 MG/1
10 TABLET, FILM COATED, EXTENDED RELEASE ORAL 2 TIMES DAILY
Status: DISCONTINUED | OUTPATIENT
Start: 2022-04-22 | End: 2022-04-24 | Stop reason: HOSPADM

## 2022-04-22 RX ORDER — SPIRONOLACTONE 50 MG/1
50 TABLET, FILM COATED ORAL 2 TIMES DAILY
COMMUNITY
End: 2022-05-06 | Stop reason: SDUPTHER

## 2022-04-22 RX ORDER — AMITRIPTYLINE HYDROCHLORIDE 10 MG/1
10 TABLET, FILM COATED ORAL 2 TIMES DAILY
COMMUNITY

## 2022-04-22 RX ORDER — OXYBUTYNIN CHLORIDE 5 MG/1
5 TABLET, EXTENDED RELEASE ORAL NIGHTLY
Status: DISCONTINUED | OUTPATIENT
Start: 2022-04-22 | End: 2022-04-24 | Stop reason: HOSPADM

## 2022-04-22 RX ORDER — AMITRIPTYLINE HYDROCHLORIDE 10 MG/1
10 TABLET, FILM COATED ORAL 2 TIMES DAILY
Status: DISCONTINUED | OUTPATIENT
Start: 2022-04-22 | End: 2022-04-24 | Stop reason: HOSPADM

## 2022-04-22 RX ORDER — SPIRONOLACTONE 25 MG/1
50 TABLET ORAL 2 TIMES DAILY
Status: DISCONTINUED | OUTPATIENT
Start: 2022-04-22 | End: 2022-04-24 | Stop reason: HOSPADM

## 2022-04-22 RX ADMIN — CARVEDILOL 50 MG: 25 TABLET, FILM COATED ORAL at 22:15

## 2022-04-22 RX ADMIN — HYDRALAZINE HYDROCHLORIDE 25 MG: 25 TABLET, FILM COATED ORAL at 15:15

## 2022-04-22 RX ADMIN — MONTELUKAST SODIUM 10 MG: 10 TABLET, FILM COATED ORAL at 22:15

## 2022-04-22 RX ADMIN — BUMETANIDE 2 MG: 0.25 INJECTION INTRAMUSCULAR; INTRAVENOUS at 15:13

## 2022-04-22 RX ADMIN — ASPIRIN 81 MG: 81 TABLET, COATED ORAL at 08:33

## 2022-04-22 RX ADMIN — GABAPENTIN 300 MG: 300 CAPSULE ORAL at 22:15

## 2022-04-22 RX ADMIN — MOMETASONE FUROATE AND FORMOTEROL FUMARATE DIHYDRATE 2 PUFF: 200; 5 AEROSOL RESPIRATORY (INHALATION) at 21:16

## 2022-04-22 RX ADMIN — POTASSIUM CHLORIDE 10 MEQ: 750 TABLET, EXTENDED RELEASE ORAL at 22:15

## 2022-04-22 RX ADMIN — HYDRALAZINE HYDROCHLORIDE 25 MG: 25 TABLET, FILM COATED ORAL at 22:14

## 2022-04-22 RX ADMIN — SODIUM CHLORIDE, PRESERVATIVE FREE 10 ML: 5 INJECTION INTRAVENOUS at 22:28

## 2022-04-22 RX ADMIN — AMLODIPINE BESYLATE 10 MG: 10 TABLET ORAL at 08:34

## 2022-04-22 RX ADMIN — TAMSULOSIN HYDROCHLORIDE 0.4 MG: 0.4 CAPSULE ORAL at 22:14

## 2022-04-22 RX ADMIN — POTASSIUM CHLORIDE 10 MEQ: 750 TABLET, EXTENDED RELEASE ORAL at 08:33

## 2022-04-22 RX ADMIN — ENOXAPARIN SODIUM 40 MG: 100 INJECTION SUBCUTANEOUS at 22:13

## 2022-04-22 RX ADMIN — DOXEPIN HYDROCHLORIDE 50 MG: 50 CAPSULE ORAL at 22:15

## 2022-04-22 RX ADMIN — CARVEDILOL 50 MG: 25 TABLET, FILM COATED ORAL at 08:33

## 2022-04-22 RX ADMIN — LEVOTHYROXINE SODIUM 175 MCG: 0.15 TABLET ORAL at 06:12

## 2022-04-22 RX ADMIN — HYDRALAZINE HYDROCHLORIDE 25 MG: 25 TABLET, FILM COATED ORAL at 06:13

## 2022-04-22 RX ADMIN — INSULIN LISPRO 1 UNITS: 100 INJECTION, SOLUTION INTRAVENOUS; SUBCUTANEOUS at 21:59

## 2022-04-22 RX ADMIN — MOMETASONE FUROATE AND FORMOTEROL FUMARATE DIHYDRATE 2 PUFF: 200; 5 AEROSOL RESPIRATORY (INHALATION) at 09:58

## 2022-04-22 RX ADMIN — IPRATROPIUM BROMIDE AND ALBUTEROL SULFATE 3 ML: .5; 3 SOLUTION RESPIRATORY (INHALATION) at 16:21

## 2022-04-22 RX ADMIN — IPRATROPIUM BROMIDE AND ALBUTEROL SULFATE 3 ML: .5; 3 SOLUTION RESPIRATORY (INHALATION) at 21:16

## 2022-04-22 RX ADMIN — SPIRONOLACTONE 50 MG: 25 TABLET ORAL at 22:28

## 2022-04-22 RX ADMIN — ROPINIROLE HYDROCHLORIDE 1 MG: 1 TABLET, FILM COATED ORAL at 22:15

## 2022-04-22 RX ADMIN — Medication 4.5 MG: at 22:24

## 2022-04-22 RX ADMIN — IPRATROPIUM BROMIDE AND ALBUTEROL SULFATE 3 ML: .5; 3 SOLUTION RESPIRATORY (INHALATION) at 09:59

## 2022-04-22 RX ADMIN — OXYCODONE AND ACETAMINOPHEN 1 TABLET: 10; 325 TABLET ORAL at 10:54

## 2022-04-22 RX ADMIN — AMITRIPTYLINE HYDROCHLORIDE 10 MG: 10 TABLET, FILM COATED ORAL at 22:29

## 2022-04-22 RX ADMIN — SODIUM CHLORIDE, PRESERVATIVE FREE 10 ML: 5 INJECTION INTRAVENOUS at 08:35

## 2022-04-22 RX ADMIN — POLYETHYLENE GLYCOL 3350 17 G: 17 POWDER, FOR SOLUTION ORAL at 16:59

## 2022-04-22 RX ADMIN — BUMETANIDE 2 MG: 0.25 INJECTION INTRAMUSCULAR; INTRAVENOUS at 06:10

## 2022-04-22 RX ADMIN — OXYBUTYNIN CHLORIDE 5 MG: 10 TABLET, EXTENDED RELEASE ORAL at 22:15

## 2022-04-22 RX ADMIN — OXYCODONE AND ACETAMINOPHEN 1 TABLET: 10; 325 TABLET ORAL at 22:17

## 2022-04-22 ASSESSMENT — PAIN DESCRIPTION - DESCRIPTORS
DESCRIPTORS: ACHING
DESCRIPTORS: ACHING

## 2022-04-22 ASSESSMENT — PAIN DESCRIPTION - ONSET
ONSET: ON-GOING
ONSET: ON-GOING

## 2022-04-22 ASSESSMENT — PAIN DESCRIPTION - ORIENTATION
ORIENTATION: LOWER
ORIENTATION: LOWER

## 2022-04-22 ASSESSMENT — PAIN DESCRIPTION - DIRECTION: RADIATING_TOWARDS: BLE

## 2022-04-22 ASSESSMENT — PAIN DESCRIPTION - LOCATION
LOCATION: BACK

## 2022-04-22 ASSESSMENT — PAIN SCALES - GENERAL
PAINLEVEL_OUTOF10: 8

## 2022-04-22 ASSESSMENT — PAIN DESCRIPTION - FREQUENCY
FREQUENCY: CONTINUOUS
FREQUENCY: CONTINUOUS

## 2022-04-22 ASSESSMENT — PAIN DESCRIPTION - PAIN TYPE
TYPE: CHRONIC PAIN
TYPE: CHRONIC PAIN

## 2022-04-22 NOTE — PLAN OF CARE
Problem: Discharge Planning  Goal: Discharge to home or other facility with appropriate resources  Note: Pt to be discharge home with the possibility of home oxygen      Problem: Pain  Goal: Verbalizes/displays adequate comfort level or baseline comfort level  Note: Pt rates pain 5/10 for his chronic back pain      Problem: Safety - Adult  Goal: Free from fall injury  Note: Pt remains free from injury during the shift      Problem: Respiratory - Adult  Goal: Achieves optimal ventilation and oxygenation  Note: Pt remains above 90% on 4L OF OXYGEN  Goal: Clear lung sounds  4/21/2022 2130 by José Miguel Almendarez RCP  Outcome: Progressing     Problem: Cardiovascular - Adult  Goal: Absence of cardiac dysrhythmias or at baseline  Note: Pt remains NS on tele     Problem: Metabolic/Fluid and Electrolytes - Adult  Goal: Glucose maintained within prescribed range  Note: Blood sugar at hs was 120

## 2022-04-22 NOTE — FLOWSHEET NOTE
[facility] PROGRESS NOTE      Patient: Shaila Hunter  Room #: 9V-63/737-P            YOB: 1960  Age: 63 y.o. Gender: male            Admit Date & Time: 4/20/2022 12:35 PM    Assessment:  Xander Mares is a 63years old and is sitting up in a chair on 6k. He is disappointed that he did not get discharged today. He stated, I have to be able to do more without the oxygen. He is Jainism  Interventions: Active listening as we had conversation and then prayer for his healing    Outcomes:    Encouragedd wants to see his Dr. Radu Mena: 1. Care Plan:  Continue spiritual and emotional care for patient and family. Including prayers.      Electronically signed by Marty Branch on 4/22/2022 at 2:09 PM.  Tae Pickens  [phone]

## 2022-04-22 NOTE — PLAN OF CARE
Problem: Respiratory - Adult  Goal: Clear lung sounds  Outcome: Progressing   Breath sounds are clear and diminished at this time. Continue with treatments to help improve breath sounds.

## 2022-04-22 NOTE — PROGRESS NOTES
Pharmacy Medication History Note      List of current medications patient is taking is complete. Source of information: Patient and surescript    Changes made to medication list:  Medications removed (include reason, ex. therapy complete or physician discontinued):  · None    Medications added/doses adjusted:  · Amitiptyline 10mg BID  · Spironolactone 50mg BID  · Klor-con 10 mEq changed from daily to BID    Other notes (ex. Recent course of antibiotics, Coumadin dosing):  · Doxycycline 7 days supply filled 3/30  · Denies use of other OTC or herbal medications.       Allergies reviewed      Electronically signed by Siegfried Severs on 4/22/2022 at 12:53 PM

## 2022-04-22 NOTE — PLAN OF CARE
Problem: Respiratory - Adult  Goal: Clear lung sounds  4/22/2022 1003 by Karsten Ferrell RCP  Outcome: Progressing  Note: Duoneb and Dulera to improve breath sounds.

## 2022-04-22 NOTE — CARE COORDINATION
4/22/22, 8:55 AM EDT    [address] [facility] day: 2  Location: [facility]25/025- Reason for admit: Acute hypoxemic respiratory failure ([facility] [J96.01]  Congestive heart failure, unspecified HF chronicity, unspecified heart failure type ([facility] [I50.9]   Procedure: none  Barriers to Discharge: K+ 3.3, creat 2.1, Hgb 7.3. Still requiring 4L oxygen, sats 97-98%, RN to wean. Remains on IV bumex bid, duonebs. Lennox Osuna PCP: Zev Kuo DO  Readmission Risk Score: 25.2 ( )%  Patient Goals/Plan/Treatment Preferences: Home with wife, has cane, denies needs. Possible home oxygen need.

## 2022-04-22 NOTE — PROGRESS NOTES
PROGRESS NOTE      Patient:  Laurie Tapia      Unit/Bed:6-25/025-A    YOB: 1960    MRN: [id number]       Acct: [de-identified]     PCP: Lianne Davies DO    Date of Admission: 4/20/2022      Assessment/Plan:    Anticipated Discharge in : 1-2 days    Active Hospital Problems    Diagnosis Date Noted    Acute on chronic heart failure ([facility] [address] [I50.9] 04/21/2022     Priority: Medium    Acute hypoxemic respiratory failure ([facility] [address] [J96.01] 04/20/2022     Priority: Medium     Acute hypoxic respiratory failure  Patient with progressive SOB on admission. SpO2 had been noted in the 70-80s prior to admission. On room air at baseline. Likely related to underlying Acute CHF exacerbation  - Continue weaning supplemental O2 as tolerated to maintain SpO2 >90%  - Currently requiring 4L NC  - Continue treatment for Acute CHF exacerbation as below. Acute on Chronic HFpEF with exacerbation  Echo 5/24/22 with EF 55-60%, moderate concentric LVH. Patient follows with CHF clinic outpatient. BNP 4743 on admission. CXR shows bilateral interstitial edema. Patient with net positive I/O yesterday. - Continue IV Bumex 2 mg BID currently  - Continue home Coreg  - Strict I&Os  - Fluid Restriction - discussed importance with patient in order to promote diuresis  - Daily Weights  - Repeat Echo to be completed - read pending    Normocytic Anemia  Hemoglobin 8.2 on admission, decreased to 7.3 today. Likely etiology Anemia of Chronic Disease, given CKD. Iron studies 3/22 consistent with this. Baseline hemoglobin appears 7.9-8.9 recently, however this is decreased from baseline 12.5 1 year ago. No signs of bleeding currently. - Daily CBC  - No signs of bleeding - continue to monitor closely    Elevated Troponin  Mildly elevated at 0.019 on admission, decreased to 0.017. Patient does have history of elevated troponin in the past. Likely related to underlying renal disease vs. Demand from acute CHF exacerbation.  No acute episodes of chest pain. - Monitor clinically    Hypokalemia  Noted on admission.  - Replace per protocol  - Monitor BMP closely with increased dose of Bumex    CKD Stage 3b  Cr appears at baseline on admission (between 2.0 - 2.6). No evidence of CAROLE. - Avoid nephrotoxic medications  - Monitor BMP closely with use of IV diuretics  - Strict I&Os    Non-insulin dependent Type 2 Diabetes Mellitus  Per history. Last A1c documented 9.3% in 5/2021.  - Hold home Amaryl  - POC Glucose checks  - Low dose SSI PRN  - Hypoglycemia protocol in place    BPH  Patient having dry mouth, which is not new for him. However, is making him want to drink more water  - Will decrease oxybutynin to 5 mg daily due to concern of side effects  - Continue home dose Flomax    HTN  BP stable currently. - Continue home Coreg, Hydralazine, Norvasc     Hypothyroidism  - Per history, continue home dose Synthroid    COPD  - Per history, stable currently. Continue home Singulair, Dulera substituted for Breo, Albuterol PRN, and DuoNebs PRN     LAURIE  - Continue home CPAP settings nightly    Chief Complaint: Shortness of breath    Hospital Course:   Per H&P:  Reyes Gupta is a 68-year-old  male with a past medical history of HTN, LAURIE, hypothyroidism, CHF who presents to [facility] ED today for the evaluation of shortness of breath from sleep clinic. Patient was to be evaluated today for sleep study however was sent over because of increased respiratory effort and SPO2 of 70 to 80%. Patient reports that last week he noted his SPO2 to be in the high 60%'s. He states that he has noticed increased shortness of breath within the last few months, and increased shortness of breath with exertion. He denies associated chest pain, lightheadedness, vision changes, nausea, vomiting abdominal pain. He reports that he was recently admitted to the hospital for hernia repair, and just had his staples removed.   Otherwise he states that he has been compliant with medications and has no other complaints at this time. Patient expresses frustration with his shortness of breath. States that he follows with CHF clinic. \"    Subjective:  Patient seen and examined this morning. Reports he is feeling about the same. Remains on 4L NC at time of exam. States he will become SOB with walking, or after taking a shower. Feels that breathing has improved since admission. Yesterday, fluid intake was 2600 ml, urine output 2575. He reports significant dry mouth, causing him to drink more. He remains on IV Bumex. Patient denies chest pain, palpitations, lightheadedness, abdominal pain, or syncope. No new concerns this morning.     Medications:  Reviewed    Infusion Medications    dextrose      sodium chloride       Scheduled Medications    [START ON 4/23/2022] oxybutynin  5 mg Oral Daily    insulin lispro  0-6 Units SubCUTAneous TID WC    insulin lispro  0-3 Units SubCUTAneous Nightly    amLODIPine  10 mg Oral Daily    aspirin EC  81 mg Oral Daily    carvedilol  50 mg Oral BID    doxepin  50 mg Oral Nightly    mometasone-formoterol  2 puff Inhalation BID    gabapentin  300 mg Oral Nightly    hydrALAZINE  25 mg Oral 3 times per day    ipratropium-albuterol  1 vial Inhalation Q4H WA    levothyroxine  175 mcg Oral Daily    montelukast  10 mg Oral Nightly    potassium chloride  10 mEq Oral Daily    rOPINIRole  1 mg Oral Nightly    tamsulosin  0.4 mg Oral Nightly    sodium chloride flush  10 mL IntraVENous 2 times per day    enoxaparin  40 mg SubCUTAneous BID    bumetanide  2 mg IntraVENous BID AC     PRN Meds: glucagon (rDNA), dextrose, glucose, dextrose bolus (hypoglycemia) **OR** dextrose bolus (hypoglycemia), albuterol sulfate HFA, melatonin, sodium chloride flush, sodium chloride, ondansetron **OR** ondansetron, polyethylene glycol, acetaminophen **OR** acetaminophen, oxyCODONE-acetaminophen, potassium chloride **OR** potassium alternative oral replacement **OR** potassium chloride      Intake/Output Summary (Last 24 hours) at 4/22/2022 1149  Last data filed at 4/22/2022 0747  Gross per 24 hour   Intake 2380 ml   Output 2350 ml   Net 30 ml       Diet:  ADULT DIET; Regular; 3 carb choices (45 gm/meal); Low Sodium (2 gm); 2000 ml    Exam:  /74   Pulse 74   Temp 97.9 °F (36.6 °C) (Oral)   Resp 18   Ht 6' 1\" (1.854 m)   Wt (!) 334 lb 4.8 oz (151.6 kg)   SpO2 98%   BMI 44.11 kg/m²     General appearance: Alert, Sitting in chair in No apparent distress, appears stated age and cooperative. HEENT: Pupils equal, round, and reactive to light. Conjunctivae/corneas clear. Neck: Supple, with full range of motion. No jugular venous distention. Trachea midline. Respiratory: Scattered crackles heard in bilateral lung bases. No wheezing, rhonchi. Cardiovascular: Regular rate and rhythm with normal S1/S2 without murmurs, rubs or gallops. Abdomen: Soft, non-tender, non-distended with normal bowel sounds. Musculoskeletal: passive and active ROM x 4 extremities. 2+ pitting edema to BLE  Skin: Skin color, texture, turgor normal.  No rashes or lesions. Psychiatric: Alert and oriented, thought content appropriate, normal insight  Capillary Refill: Brisk,< 3 seconds   Peripheral Pulses: +2 palpable, equal bilaterally       Labs:   Recent Labs     04/20/22  1250 04/20/22  1250 04/21/22  0517 04/21/22  1450 04/22/22  0528   WBC 6.8  --  5.2  --  5.0   HGB 8.2*   < > 7.2* 7.4* 7.3*   HCT 27.3*   < > 24.5* 25.1* 25.0*     --  202  --  219    < > = values in this interval not displayed. Recent Labs     04/20/22  1250 04/21/22  0517 04/22/22  0528    140 143   K 3.1* 3.0* 3.3*    102 103   CO2 25 28 26   BUN 19 21 26*   CREATININE 1.8* 2.0* 2.1*   CALCIUM 8.2* 7.9* 8.2*     Recent Labs     04/20/22  1250   AST 16   ALT 17   BILIDIR <0.2   BILITOT 0.5   ALKPHOS 104     No results for input(s): INR in the last 72 hours.   No results for input(s): Giuliana Arreola in the last 72 hours. Urinalysis:      Lab Results   Component Value Date    NITRU NEGATIVE 03/23/2022    WBCUA 5-9 03/23/2022    BACTERIA NONE SEEN 03/23/2022    RBCUA 25-50 03/23/2022    BLOODU MODERATE 03/23/2022    SPECGRAV 1.015 03/23/2022    GLUCOSEU 500 04/13/2021       Radiology:  XR CHEST PORTABLE   Final Result   1. Moderate cardiomegaly. Tiny bilateral pleural effusions. 2. Mild interstitial pneumonia/edema both mid and lower lung fields. Cannot exclude incipient CHF. **This report has been created using voice recognition software. It may contain minor errors which are inherent in voice recognition technology. **      Final report electronically signed by Dr. Alice Osborn on 4/20/2022 1:19 PM          Diet: ADULT DIET; Regular; 3 carb choices (45 gm/meal); Low Sodium (2 gm); 2000 ml    DVT prophylaxis: [x] Lovenox                                 [] SCDs                                 [] SQ Heparin                                  [] Encourage ambulation           [] Already on Anticoagulation     Disposition:    [x] Home       [] TCU       [] Rehab       [] Psych       [] SNF       [] [facility]       [] Other-    Code Status: Full Code    PT/OT Eval Status: Consulted      Electronically signed by Colette Rinne, MD on 4/22/2022 at 11:49 AM    This note was electronically signed. Parts of this note may have been dictated by use of voice recognition software and electronically transcribed. The note may contain errors not detected in proofreading. Please refer to my supervising physician's documentation if my documentation differs.

## 2022-04-23 LAB
ANION GAP SERPL CALCULATED.3IONS-SCNC: 14 MEQ/L (ref 8–16)
BASOPHILS # BLD: 0.4 %
BASOPHILS ABSOLUTE: 0 THOU/MM3 (ref 0–0.1)
BUN BLDV-MCNC: 29 MG/DL (ref 7–22)
CALCIUM SERPL-MCNC: 8.3 MG/DL (ref 8.5–10.5)
CHLORIDE BLD-SCNC: 99 MEQ/L (ref 98–111)
CO2: 24 MEQ/L (ref 23–33)
CREAT SERPL-MCNC: 2.4 MG/DL (ref 0.4–1.2)
EOSINOPHIL # BLD: 2.1 %
EOSINOPHILS ABSOLUTE: 0.1 THOU/MM3 (ref 0–0.4)
ERYTHROCYTE [DISTWIDTH] IN BLOOD BY AUTOMATED COUNT: 17.7 % (ref 11.5–14.5)
ERYTHROCYTE [DISTWIDTH] IN BLOOD BY AUTOMATED COUNT: 53.4 FL (ref 35–45)
GFR SERPL CREATININE-BSD FRML MDRD: 28 ML/MIN/1.73M2
GLUCOSE BLD-MCNC: 101 MG/DL (ref 70–108)
GLUCOSE BLD-MCNC: 103 MG/DL (ref 70–108)
GLUCOSE BLD-MCNC: 104 MG/DL (ref 70–108)
GLUCOSE BLD-MCNC: 119 MG/DL (ref 70–108)
GLUCOSE BLD-MCNC: 90 MG/DL (ref 70–108)
HCT VFR BLD CALC: 26.4 % (ref 42–52)
HEMOGLOBIN: 7.7 GM/DL (ref 14–18)
IMMATURE GRANS (ABS): 0.03 THOU/MM3 (ref 0–0.07)
IMMATURE GRANULOCYTES: 0.6 %
LYMPHOCYTES # BLD: 12.8 %
LYMPHOCYTES ABSOLUTE: 0.6 THOU/MM3 (ref 1–4.8)
MAGNESIUM: 1.7 MG/DL (ref 1.6–2.4)
MCH RBC QN AUTO: 24.9 PG (ref 26–33)
MCHC RBC AUTO-ENTMCNC: 29.2 GM/DL (ref 32.2–35.5)
MCV RBC AUTO: 85.4 FL (ref 80–94)
MONOCYTES # BLD: 8.7 %
MONOCYTES ABSOLUTE: 0.4 THOU/MM3 (ref 0.4–1.3)
NUCLEATED RED BLOOD CELLS: 0 /100 WBC
PLATELET # BLD: 223 THOU/MM3 (ref 130–400)
PMV BLD AUTO: 8.9 FL (ref 9.4–12.4)
POTASSIUM REFLEX MAGNESIUM: 3.2 MEQ/L (ref 3.5–5.2)
RBC # BLD: 3.09 MILL/MM3 (ref 4.7–6.1)
SEG NEUTROPHILS: 75.4 %
SEGMENTED NEUTROPHILS ABSOLUTE COUNT: 3.6 THOU/MM3 (ref 1.8–7.7)
SODIUM BLD-SCNC: 137 MEQ/L (ref 135–145)
WBC # BLD: 4.8 THOU/MM3 (ref 4.8–10.8)

## 2022-04-23 PROCEDURE — 6370000000 HC RX 637 (ALT 250 FOR IP): Performed by: STUDENT IN AN ORGANIZED HEALTH CARE EDUCATION/TRAINING PROGRAM

## 2022-04-23 PROCEDURE — 94640 AIRWAY INHALATION TREATMENT: CPT

## 2022-04-23 PROCEDURE — 6370000000 HC RX 637 (ALT 250 FOR IP)

## 2022-04-23 PROCEDURE — 85025 COMPLETE CBC W/AUTO DIFF WBC: CPT

## 2022-04-23 PROCEDURE — 36415 COLL VENOUS BLD VENIPUNCTURE: CPT

## 2022-04-23 PROCEDURE — 94760 N-INVAS EAR/PLS OXIMETRY 1: CPT

## 2022-04-23 PROCEDURE — 99231 SBSQ HOSP IP/OBS SF/LOW 25: CPT | Performed by: FAMILY MEDICINE

## 2022-04-23 PROCEDURE — 6360000002 HC RX W HCPCS

## 2022-04-23 PROCEDURE — 2580000003 HC RX 258

## 2022-04-23 PROCEDURE — 82948 REAGENT STRIP/BLOOD GLUCOSE: CPT

## 2022-04-23 PROCEDURE — 2700000000 HC OXYGEN THERAPY PER DAY

## 2022-04-23 PROCEDURE — 2500000003 HC RX 250 WO HCPCS

## 2022-04-23 PROCEDURE — 83735 ASSAY OF MAGNESIUM: CPT

## 2022-04-23 PROCEDURE — 1200000003 HC TELEMETRY R&B

## 2022-04-23 PROCEDURE — 80048 BASIC METABOLIC PNL TOTAL CA: CPT

## 2022-04-23 RX ORDER — BUMETANIDE 1 MG/1
2 TABLET ORAL DAILY
Status: DISCONTINUED | OUTPATIENT
Start: 2022-04-24 | End: 2022-04-24 | Stop reason: HOSPADM

## 2022-04-23 RX ORDER — IPRATROPIUM BROMIDE AND ALBUTEROL SULFATE 2.5; .5 MG/3ML; MG/3ML
1 SOLUTION RESPIRATORY (INHALATION) 2 TIMES DAILY
Status: DISCONTINUED | OUTPATIENT
Start: 2022-04-23 | End: 2022-04-24 | Stop reason: HOSPADM

## 2022-04-23 RX ADMIN — MONTELUKAST SODIUM 10 MG: 10 TABLET, FILM COATED ORAL at 22:17

## 2022-04-23 RX ADMIN — MOMETASONE FUROATE AND FORMOTEROL FUMARATE DIHYDRATE 2 PUFF: 200; 5 AEROSOL RESPIRATORY (INHALATION) at 18:08

## 2022-04-23 RX ADMIN — LEVOTHYROXINE SODIUM 175 MCG: 0.15 TABLET ORAL at 05:01

## 2022-04-23 RX ADMIN — SODIUM CHLORIDE, PRESERVATIVE FREE 10 ML: 5 INJECTION INTRAVENOUS at 09:42

## 2022-04-23 RX ADMIN — CARVEDILOL 50 MG: 25 TABLET, FILM COATED ORAL at 08:22

## 2022-04-23 RX ADMIN — OXYBUTYNIN CHLORIDE 5 MG: 10 TABLET, EXTENDED RELEASE ORAL at 22:17

## 2022-04-23 RX ADMIN — SPIRONOLACTONE 50 MG: 25 TABLET ORAL at 22:16

## 2022-04-23 RX ADMIN — AMITRIPTYLINE HYDROCHLORIDE 10 MG: 10 TABLET, FILM COATED ORAL at 08:23

## 2022-04-23 RX ADMIN — DOXEPIN HYDROCHLORIDE 50 MG: 50 CAPSULE ORAL at 22:17

## 2022-04-23 RX ADMIN — ENOXAPARIN SODIUM 40 MG: 100 INJECTION SUBCUTANEOUS at 08:21

## 2022-04-23 RX ADMIN — TAMSULOSIN HYDROCHLORIDE 0.4 MG: 0.4 CAPSULE ORAL at 22:16

## 2022-04-23 RX ADMIN — BUMETANIDE 2 MG: 0.25 INJECTION INTRAMUSCULAR; INTRAVENOUS at 16:20

## 2022-04-23 RX ADMIN — IPRATROPIUM BROMIDE AND ALBUTEROL SULFATE 3 ML: .5; 3 SOLUTION RESPIRATORY (INHALATION) at 18:08

## 2022-04-23 RX ADMIN — SPIRONOLACTONE 50 MG: 25 TABLET ORAL at 08:23

## 2022-04-23 RX ADMIN — GABAPENTIN 300 MG: 300 CAPSULE ORAL at 22:16

## 2022-04-23 RX ADMIN — HYDRALAZINE HYDROCHLORIDE 25 MG: 25 TABLET, FILM COATED ORAL at 05:01

## 2022-04-23 RX ADMIN — OXYCODONE AND ACETAMINOPHEN 1 TABLET: 10; 325 TABLET ORAL at 08:21

## 2022-04-23 RX ADMIN — CARVEDILOL 50 MG: 25 TABLET, FILM COATED ORAL at 22:17

## 2022-04-23 RX ADMIN — IPRATROPIUM BROMIDE AND ALBUTEROL SULFATE 3 ML: .5; 3 SOLUTION RESPIRATORY (INHALATION) at 07:48

## 2022-04-23 RX ADMIN — HYDRALAZINE HYDROCHLORIDE 25 MG: 25 TABLET, FILM COATED ORAL at 22:17

## 2022-04-23 RX ADMIN — ENOXAPARIN SODIUM 40 MG: 100 INJECTION SUBCUTANEOUS at 22:17

## 2022-04-23 RX ADMIN — HYDRALAZINE HYDROCHLORIDE 25 MG: 25 TABLET, FILM COATED ORAL at 14:59

## 2022-04-23 RX ADMIN — POTASSIUM CHLORIDE 40 MEQ: 1500 TABLET, EXTENDED RELEASE ORAL at 11:38

## 2022-04-23 RX ADMIN — ROPINIROLE HYDROCHLORIDE 1 MG: 1 TABLET, FILM COATED ORAL at 22:17

## 2022-04-23 RX ADMIN — OXYCODONE AND ACETAMINOPHEN 1 TABLET: 10; 325 TABLET ORAL at 16:19

## 2022-04-23 RX ADMIN — AMLODIPINE BESYLATE 10 MG: 10 TABLET ORAL at 08:23

## 2022-04-23 RX ADMIN — MOMETASONE FUROATE AND FORMOTEROL FUMARATE DIHYDRATE 2 PUFF: 200; 5 AEROSOL RESPIRATORY (INHALATION) at 07:48

## 2022-04-23 RX ADMIN — ASPIRIN 81 MG: 81 TABLET, COATED ORAL at 08:21

## 2022-04-23 RX ADMIN — AMITRIPTYLINE HYDROCHLORIDE 10 MG: 10 TABLET, FILM COATED ORAL at 22:17

## 2022-04-23 ASSESSMENT — PAIN DESCRIPTION - LOCATION
LOCATION: BACK
LOCATION: BACK

## 2022-04-23 ASSESSMENT — PAIN DESCRIPTION - DESCRIPTORS
DESCRIPTORS: ACHING
DESCRIPTORS: ACHING

## 2022-04-23 ASSESSMENT — PAIN DESCRIPTION - ONSET: ONSET: ON-GOING

## 2022-04-23 ASSESSMENT — PAIN SCALES - GENERAL
PAINLEVEL_OUTOF10: 8
PAINLEVEL_OUTOF10: 8
PAINLEVEL_OUTOF10: 7
PAINLEVEL_OUTOF10: 3

## 2022-04-23 ASSESSMENT — PAIN DESCRIPTION - PAIN TYPE
TYPE: CHRONIC PAIN
TYPE: CHRONIC PAIN

## 2022-04-23 ASSESSMENT — PAIN DESCRIPTION - PROGRESSION: CLINICAL_PROGRESSION: NOT CHANGED

## 2022-04-23 ASSESSMENT — PAIN - FUNCTIONAL ASSESSMENT: PAIN_FUNCTIONAL_ASSESSMENT: PREVENTS OR INTERFERES SOME ACTIVE ACTIVITIES AND ADLS

## 2022-04-23 ASSESSMENT — PAIN DESCRIPTION - ORIENTATION: ORIENTATION: LOWER

## 2022-04-23 NOTE — PLAN OF CARE
Placed in room 1. Placed on cardiac monitor, blood pressure machine and pulse 
oximeter. To gown for exam. Side rails up. Problem: Pain  Goal: Verbalizes/displays adequate comfort level or baseline comfort level  4/23/2022 0939 by Grupo Prieto RN  Outcome: Progressing  Flowsheets (Taken 4/23/2022 0214)  Verbalizes/displays adequate comfort level or baseline comfort level: Encourage patient to monitor pain and request assistance  Note: Prn pain med given per request and order, Pt states effectiveness  4/23/2022 0426 by Gill FLORES  Outcome: Progressing  Note: Patient uses a heating bad when up in chair for pain relief. Patient was given a Percocet at 2217 for pain in his lower back at bedtimes. No further complaints have been made this shift. Problem: Safety - Adult  Goal: Free from fall injury  4/23/2022 0939 by Grupo Prieto RN  Flowsheets (Taken 4/23/2022 6664)  Free From Fall Injury: Instruct family/caregiver on patient safety  Note: Pt states understanding of safety measures  4/23/2022 0426 by Gill FLORES  Outcome: Progressing     Problem: Respiratory - Adult  Goal: Achieves optimal ventilation and oxygenation  4/23/2022 0939 by Grupo Prieto RN  Flowsheets (Taken 4/23/2022 4271)  Achieves optimal ventilation and oxygenation: Assess for changes in respiratory status  Note: Pt tolerating O2 at 1 liter per n/c  4/23/2022 0426 by Hannah Silva  Outcome: Progressing  Note: Patient continues to wear oxygen at all times and his C-pap with oxygen at b  Problem: Respiratory - Adult  Goal: Achieves optimal ventilation and oxygenation  4/23/2022 0939 by Grupo Prieto RN  Flowsheets (Taken 4/23/2022 1439)  Achieves optimal ventilation and oxygenation: Assess for changes in respiratory status  Note: Pt tolerating O2 at 1 liter per n/c  4/23/2022 0426 by Gill FLORES  Outcome: Progressing  Note: Patient continues to wear oxygen at all times and his C-pap with oxygen at bedtime. Care plan reviewed with patient Patient  verbalize understanding of the plan of care and contribute to goal setting.      Goal: Clear lung sounds  4/22/2022 2128 by Amaris Barcenas RCP  Outcome: Progressing

## 2022-04-23 NOTE — PLAN OF CARE
Problem: Discharge Planning  Goal: Discharge to home or other facility with appropriate resources  Outcome: Progressing  Note: Patient plans to be discharged to home when medically stable. Problem: Pain  Goal: Verbalizes/displays adequate comfort level or baseline comfort level  Outcome: Progressing  Note: Patient uses a heating bad when up in chair for pain relief. Patient was given a Percocet at 2217 for pain in his lower back at bedtimes. No further complaints have been made this shift. Problem: Safety - Adult  Goal: Free from fall injury  Outcome: Progressing     Problem: Respiratory - Adult  Goal: Achieves optimal ventilation and oxygenation  Outcome: Progressing  Note: Patient continues to wear oxygen at all times and his C-pap with oxygen at bedtime. Goal: Clear lung sounds  4/22/2022 2128 by Henrry Reynoso RCP  Outcome: Progressing     Problem: Cardiovascular - Adult  Goal: Maintains optimal cardiac output and hemodynamic stability  Outcome: Progressing  Goal: Absence of cardiac dysrhythmias or at baseline  Outcome: Progressing     Problem: Metabolic/Fluid and Electrolytes - Adult  Goal: Electrolytes maintained within normal limits  Outcome: Progressing  Goal: Hemodynamic stability and optimal renal function maintained  Outcome: Progressing  Goal: Glucose maintained within prescribed range  Outcome: Progressing  Note: Labs are drawn every morning and reviewed by providers and nursing. Problem: Coping  Goal: Pt/Family able to verbalize concerns and demonstrate effective coping strategies  Description: INTERVENTIONS:  1. Assist patient/family to identify coping skills, available support systems and cultural and spiritual values  2. Provide emotional support, including active listening and acknowledgement of concerns of patient and caregivers  3. Reduce environmental stimuli, as able  4. Instruct patient/family in relaxation techniques, as appropriate  5.  Assess for spiritual pain/suffering and initiate Spiritual Care, Psychosocial Clinical Specialist consults as needed  Outcome: Stiven Mitcheller (Taken 4/21/2022 1147 by Lucio Sosa RN)  Patient/family able to verbalize anxieties, fears, and concerns, and demonstrate effective coping:   Assist patient/family to identify coping skills, available support systems and cultural and spiritual values   Instruct patient/family in relaxation techniques, as appropriate     Problem: Chronic Conditions and Co-morbidities  Goal: Patient's chronic conditions and co-morbidity symptoms are monitored and maintained or improved  Outcome: Progressing  Flowsheets (Taken 4/23/2022 0426)  Care Plan - Patient's Chronic Conditions and Co-Morbidity Symptoms are Monitored and Maintained or Improved: Collaborate with multidisciplinary team to address chronic and comorbid conditions and prevent exacerbation or deterioration

## 2022-04-23 NOTE — PLAN OF CARE
Problem: Respiratory - Adult  Goal: Clear lung sounds  4/23/2022 1057 by Anabell Yang RCP  Outcome: Progressing   Cont breathing treatments to decrease wheezing, increase aeration and improve WOB.

## 2022-04-23 NOTE — PROGRESS NOTES
PROGRESS NOTE      Patient:  Melene Olszewski      Unit/Bed:[facility]/025-A    YOB: 1960    MRN: [id number]       Acct: [de-identified]     PCP: Angela Trevino DO    Date of Admission: 4/20/2022      Assessment/Plan:    Anticipated Discharge in : 1-2 days    Active Hospital Problems    Diagnosis Date Noted    Acute on chronic heart failure ([facility] [address] [I50.9] 04/21/2022     Priority: Medium    Acute hypoxemic respiratory failure ([facility] [address] [J96.01] 04/20/2022     Priority: Medium     Acute hypoxic respiratory failure  Patient with progressive SOB on admission. SpO2 had been noted in the 70-80s prior to admission. On room air at baseline. Likely related to underlying Acute CHF exacerbation  - Continue weaning supplemental O2 as tolerated to maintain SpO2 >90%  - Currently requiring 1L NC  - Add Incentive Spirometry  - Continue treatment for Acute CHF exacerbation as below. Acute on Chronic HFpEF with exacerbation  Echo 5/24/22 with EF 55-60%, moderate concentric LVH. Patient follows with CHF clinic outpatient. BNP 4743 on admission. CXR shows bilateral interstitial edema. Echo 4/21/22 - EF 55%, no wall motion abnormalities. Patient clinically improving  - Continue IV Bumex 2 mg BID currently - transition to PO tomorrow  - Continue home Coreg  - Strict I&Os  - Fluid Restriction - discussed importance with patient in order to promote diuresis  - Daily Weights  - Patient is a good candidate for Cardiac Rehab after discharge, discussed with patient and he is very interested - will place consult. Normocytic Anemia  Hemoglobin 8.2 on admission. Stable at 7.7 today. Likely etiology Anemia of Chronic Disease, given CKD. Iron studies 3/22 consistent with this. Baseline hemoglobin appears 7.9-8.9 recently, however this is decreased from baseline 12.5 1 year ago. No signs of bleeding currently.   - Daily CBC  - No signs of bleeding - continue to monitor closely    Elevated Troponin  Mildly elevated at 0.019 on admission, decreased to 0.017. Patient does have history of elevated troponin in the past. Likely related to underlying renal disease vs. Demand from acute CHF exacerbation. No acute episodes of chest pain. - Monitor clinically    Hypokalemia  Noted on admission.  - Replace per protocol  - Monitor BMP closely with increased dose of Bumex    CKD Stage 3b  Cr appears at baseline on admission (between 2.0 - 2.6). No evidence of CAROLE. - Avoid nephrotoxic medications  - Monitor BMP closely with use of IV diuretics  - Strict I&Os    Non-insulin dependent Type 2 Diabetes Mellitus  Per history. Last A1c documented 9.3% in 5/2021.  - Hold home Amaryl  - POC Glucose checks  - Low dose SSI PRN  - Hypoglycemia protocol in place    BPH  Patient having dry mouth, which is not new for him. However, is making him want to drink more water  - Decrease oxybutynin to 5 mg daily due to concern of side effects  - Continue home dose Flomax    HTN  BP overall stable. - Continue home Coreg, Hydralazine, Norvasc     Hypothyroidism  - Per history, continue home dose Synthroid    COPD  - Per history, stable currently. Continue home Singulair, Dulera substituted for Breo, Albuterol PRN, and DuoNebs PRN     LAURIE  - Continue home CPAP settings nightly    Chief Complaint: Shortness of breath    Hospital Course:   Per H&P:  Jossie Partida is a 24-year-old  male with a past medical history of HTN, LAURIE, hypothyroidism, CHF who presents to [facility] ED today for the evaluation of shortness of breath from sleep clinic. Patient was to be evaluated today for sleep study however was sent over because of increased respiratory effort and SPO2 of 70 to 80%. Patient reports that last week he noted his SPO2 to be in the high 60%'s. He states that he has noticed increased shortness of breath within the last few months, and increased shortness of breath with exertion. He denies associated chest pain, lightheadedness, vision changes, nausea, vomiting abdominal pain.   He reports that he was recently admitted to the hospital for hernia repair, and just had his staples removed. Otherwise he states that he has been compliant with medications and has no other complaints at this time. Patient expresses frustration with his shortness of breath. States that he follows with CHF clinic. \"    Subjective:  Patient seen and examined this morning. States he is feeling closer to his baseline. Reports he has been taking deep breaths, which has been helpful. He continues to feel fatigued with walking or exertion. He is working on fluid restriction. He remains on IV Bumex. Patient denies chest pain, palpitations, lightheadedness, abdominal pain, or syncope. No new concerns this morning.     Medications:  Reviewed    Infusion Medications    dextrose      sodium chloride       Scheduled Medications    ipratropium-albuterol  1 vial Inhalation BID    oxybutynin  5 mg Oral Nightly    [Held by provider] potassium chloride  10 mEq Oral BID    amitriptyline  10 mg Oral BID    spironolactone  50 mg Oral BID    insulin lispro  0-6 Units SubCUTAneous TID WC    insulin lispro  0-3 Units SubCUTAneous Nightly    amLODIPine  10 mg Oral Daily    aspirin EC  81 mg Oral Daily    carvedilol  50 mg Oral BID    doxepin  50 mg Oral Nightly    mometasone-formoterol  2 puff Inhalation BID    gabapentin  300 mg Oral Nightly    hydrALAZINE  25 mg Oral 3 times per day    levothyroxine  175 mcg Oral Daily    montelukast  10 mg Oral Nightly    rOPINIRole  1 mg Oral Nightly    tamsulosin  0.4 mg Oral Nightly    sodium chloride flush  10 mL IntraVENous 2 times per day    enoxaparin  40 mg SubCUTAneous BID    bumetanide  2 mg IntraVENous BID AC     PRN Meds: glucagon (rDNA), dextrose, glucose, dextrose bolus (hypoglycemia) **OR** dextrose bolus (hypoglycemia), albuterol sulfate HFA, melatonin, sodium chloride flush, sodium chloride, ondansetron **OR** ondansetron, polyethylene glycol, acetaminophen **OR** BILITOT 0.5   ALKPHOS 104     No results for input(s): INR in the last 72 hours. No results for input(s): Verlena Emmanuel in the last 72 hours. Urinalysis:      Lab Results   Component Value Date    NITRU NEGATIVE 03/23/2022    WBCUA 5-9 03/23/2022    BACTERIA NONE SEEN 03/23/2022    RBCUA 25-50 03/23/2022    BLOODU MODERATE 03/23/2022    SPECGRAV 1.015 03/23/2022    GLUCOSEU 500 04/13/2021       Radiology:  XR CHEST PORTABLE   Final Result   1. Moderate cardiomegaly. Tiny bilateral pleural effusions. 2. Mild interstitial pneumonia/edema both mid and lower lung fields. Cannot exclude incipient CHF. **This report has been created using voice recognition software. It may contain minor errors which are inherent in voice recognition technology. **      Final report electronically signed by Dr. Karla Fletcher on 4/20/2022 1:19 PM          Diet: ADULT DIET; Regular; 3 carb choices (45 gm/meal); Low Sodium (2 gm); 2000 ml    DVT prophylaxis: [x] Lovenox                                 [] SCDs                                 [] SQ Heparin                                  [] Encourage ambulation           [] Already on Anticoagulation     Disposition:    [x] Home       [] TCU       [] Rehab       [] Psych       [] SNF       [] [facility]       [] Other-    Code Status: Full Code    PT/OT Eval Status: Consulted      Electronically signed by Aida Olson MD on 4/23/2022 at 11:26 AM    This note was electronically signed. Parts of this note may have been dictated by use of voice recognition software and electronically transcribed. The note may contain errors not detected in proofreading. Please refer to my supervising physician's documentation if my documentation differs.

## 2022-04-23 NOTE — PLAN OF CARE
Problem: Respiratory - Adult  Goal: Clear lung sounds  4/22/2022 2128 by Mark Palumbo RCP  Outcome: Progressing     Aerosol therapy appropriate for patient at this time. Continue as ordered for maintenance and to improve aeration of lungs.

## 2022-04-24 VITALS
DIASTOLIC BLOOD PRESSURE: 71 MMHG | HEART RATE: 66 BPM | OXYGEN SATURATION: 97 % | BODY MASS INDEX: 41.75 KG/M2 | RESPIRATION RATE: 16 BRPM | WEIGHT: 315 LBS | SYSTOLIC BLOOD PRESSURE: 116 MMHG | TEMPERATURE: 97.9 F | HEIGHT: 73 IN

## 2022-04-24 LAB
ANION GAP SERPL CALCULATED.3IONS-SCNC: 14 MEQ/L (ref 8–16)
ANISOCYTOSIS: PRESENT
BASOPHILIA: ABNORMAL
BASOPHILS # BLD: 0.5 %
BASOPHILS ABSOLUTE: 0 THOU/MM3 (ref 0–0.1)
BUN BLDV-MCNC: 33 MG/DL (ref 7–22)
CALCIUM SERPL-MCNC: 8.4 MG/DL (ref 8.5–10.5)
CHLORIDE BLD-SCNC: 100 MEQ/L (ref 98–111)
CO2: 25 MEQ/L (ref 23–33)
CREAT SERPL-MCNC: 2.8 MG/DL (ref 0.4–1.2)
DIFFERENTIAL TYPE: ABNORMAL
EOSINOPHIL # BLD: 2.4 %
EOSINOPHILS ABSOLUTE: 0.1 THOU/MM3 (ref 0–0.4)
ERYTHROCYTE [DISTWIDTH] IN BLOOD BY AUTOMATED COUNT: 17.4 % (ref 11.5–14.5)
ERYTHROCYTE [DISTWIDTH] IN BLOOD BY AUTOMATED COUNT: 54.4 FL (ref 35–45)
GFR SERPL CREATININE-BSD FRML MDRD: 23 ML/MIN/1.73M2
GLUCOSE BLD-MCNC: 87 MG/DL (ref 70–108)
GLUCOSE BLD-MCNC: 92 MG/DL (ref 70–108)
HCT VFR BLD CALC: 23.3 % (ref 42–52)
HCT VFR BLD CALC: 26.3 % (ref 42–52)
HEMOGLOBIN: 6.8 GM/DL (ref 14–18)
HEMOGLOBIN: 7.7 GM/DL (ref 14–18)
IMMATURE GRANS (ABS): 0.02 THOU/MM3 (ref 0–0.07)
IMMATURE GRANULOCYTES: 0.5 %
LYMPHOCYTES # BLD: 12.6 %
LYMPHOCYTES ABSOLUTE: 0.5 THOU/MM3 (ref 1–4.8)
MAGNESIUM: 1.9 MG/DL (ref 1.6–2.4)
MCH RBC QN AUTO: 24.7 PG (ref 26–33)
MCHC RBC AUTO-ENTMCNC: 29.2 GM/DL (ref 32.2–35.5)
MCV RBC AUTO: 84.7 FL (ref 80–94)
MONOCYTES # BLD: 9 %
MONOCYTES ABSOLUTE: 0.4 THOU/MM3 (ref 0.4–1.3)
NUCLEATED RED BLOOD CELLS: 0 /100 WBC
PATHOLOGIST REVIEW: ABNORMAL
PLATELET # BLD: 200 THOU/MM3 (ref 130–400)
PMV BLD AUTO: 8.4 FL (ref 9.4–12.4)
POTASSIUM REFLEX MAGNESIUM: 3.3 MEQ/L (ref 3.5–5.2)
RBC # BLD: 2.75 MILL/MM3 (ref 4.7–6.1)
SCAN OF BLOOD SMEAR: NORMAL
SEG NEUTROPHILS: 75 %
SEGMENTED NEUTROPHILS ABSOLUTE COUNT: 3.2 THOU/MM3 (ref 1.8–7.7)
SODIUM BLD-SCNC: 139 MEQ/L (ref 135–145)
WBC # BLD: 4.2 THOU/MM3 (ref 4.8–10.8)

## 2022-04-24 PROCEDURE — 6370000000 HC RX 637 (ALT 250 FOR IP): Performed by: FAMILY MEDICINE

## 2022-04-24 PROCEDURE — 99239 HOSP IP/OBS DSCHRG MGMT >30: CPT | Performed by: FAMILY MEDICINE

## 2022-04-24 PROCEDURE — 36415 COLL VENOUS BLD VENIPUNCTURE: CPT

## 2022-04-24 PROCEDURE — 85025 COMPLETE CBC W/AUTO DIFF WBC: CPT

## 2022-04-24 PROCEDURE — 6370000000 HC RX 637 (ALT 250 FOR IP)

## 2022-04-24 PROCEDURE — 94761 N-INVAS EAR/PLS OXIMETRY MLT: CPT

## 2022-04-24 PROCEDURE — 6370000000 HC RX 637 (ALT 250 FOR IP): Performed by: STUDENT IN AN ORGANIZED HEALTH CARE EDUCATION/TRAINING PROGRAM

## 2022-04-24 PROCEDURE — 82948 REAGENT STRIP/BLOOD GLUCOSE: CPT

## 2022-04-24 PROCEDURE — 6360000002 HC RX W HCPCS

## 2022-04-24 PROCEDURE — 85014 HEMATOCRIT: CPT

## 2022-04-24 PROCEDURE — 83735 ASSAY OF MAGNESIUM: CPT

## 2022-04-24 PROCEDURE — 94640 AIRWAY INHALATION TREATMENT: CPT

## 2022-04-24 PROCEDURE — 80048 BASIC METABOLIC PNL TOTAL CA: CPT

## 2022-04-24 PROCEDURE — 2700000000 HC OXYGEN THERAPY PER DAY

## 2022-04-24 PROCEDURE — 85018 HEMOGLOBIN: CPT

## 2022-04-24 RX ORDER — OXYBUTYNIN CHLORIDE 5 MG/1
5 TABLET, EXTENDED RELEASE ORAL NIGHTLY
Qty: 30 TABLET | Refills: 0 | Status: SHIPPED | OUTPATIENT
Start: 2022-04-24 | End: 2022-10-24

## 2022-04-24 RX ADMIN — SPIRONOLACTONE 50 MG: 25 TABLET ORAL at 09:36

## 2022-04-24 RX ADMIN — CARVEDILOL 50 MG: 25 TABLET, FILM COATED ORAL at 09:36

## 2022-04-24 RX ADMIN — POTASSIUM CHLORIDE 40 MEQ: 1500 TABLET, EXTENDED RELEASE ORAL at 09:36

## 2022-04-24 RX ADMIN — AMITRIPTYLINE HYDROCHLORIDE 10 MG: 10 TABLET, FILM COATED ORAL at 09:36

## 2022-04-24 RX ADMIN — LEVOTHYROXINE SODIUM 175 MCG: 0.15 TABLET ORAL at 06:10

## 2022-04-24 RX ADMIN — BUMETANIDE 2 MG: 1 TABLET ORAL at 09:36

## 2022-04-24 RX ADMIN — MOMETASONE FUROATE AND FORMOTEROL FUMARATE DIHYDRATE 2 PUFF: 200; 5 AEROSOL RESPIRATORY (INHALATION) at 07:55

## 2022-04-24 RX ADMIN — IPRATROPIUM BROMIDE AND ALBUTEROL SULFATE 3 ML: .5; 3 SOLUTION RESPIRATORY (INHALATION) at 07:55

## 2022-04-24 RX ADMIN — ASPIRIN 81 MG: 81 TABLET, COATED ORAL at 09:36

## 2022-04-24 RX ADMIN — AMLODIPINE BESYLATE 10 MG: 10 TABLET ORAL at 09:36

## 2022-04-24 RX ADMIN — HYDRALAZINE HYDROCHLORIDE 25 MG: 25 TABLET, FILM COATED ORAL at 06:12

## 2022-04-24 RX ADMIN — ENOXAPARIN SODIUM 40 MG: 100 INJECTION SUBCUTANEOUS at 09:36

## 2022-04-24 ASSESSMENT — PAIN DESCRIPTION - PROGRESSION
CLINICAL_PROGRESSION: NOT CHANGED

## 2022-04-24 ASSESSMENT — PAIN SCALES - GENERAL: PAINLEVEL_OUTOF10: 3

## 2022-04-24 ASSESSMENT — PAIN DESCRIPTION - LOCATION: LOCATION: BACK

## 2022-04-24 ASSESSMENT — PAIN DESCRIPTION - PAIN TYPE: TYPE: CHRONIC PAIN

## 2022-04-24 NOTE — DISCHARGE SUMMARY
DISCHARGE SUMMARY      Patient Identification:   Laurie Tapia   : 1960  MRN: [id number]   Account: [de-identified]      Patient's PCP: Lianne Davies DO    Admit Date: 2022     Discharge Date:   22     Admitting Physician: Rj Elizondo MD     Discharge Physician: Faye Boyce DO     Discharge Diagnoses: Active Hospital Problems    Diagnosis Date Noted    Acute on chronic heart failure ([facility] [address] [I50.9] 2022     Priority: Medium    Acute hypoxemic respiratory failure [facility]) [J96.01] 2022     Priority: Medium       The patient was seen and examined on day of discharge and this discharge summary is in conjunction with any daily progress note from day of discharge. Hospital Course:   Laurie Tapia is a 63 y.o. male admitted to [address] [place] [address] on 2022 for HFpEF exacerbation. Patient was admitted on 2022 as a result of heart failure with preserved ejection fraction exacerbation. He diuresed well with Bumex 2 mg IV twice daily and along with fluid restriction and sodium restriction was a net -5765 mL. He reports that he is at his dry weight. He is being discharged on the same regimen as he was on prior, Bumex 2 mg daily with 1 mg available for as needed use. During the course of his admission we decreased the dose of oxybutynin from 10 mg nightly to 5 mg nightly in an effort to reduce his dry mouth and thirst.  He reports that his urinary symptoms are controlled on the lower dose. Hemoglobin is consistently in the low sevens, he should follow-up with nephrology and consideration should be given to Procrit as an option for sustaining slightly higher hemoglobin levels.     With recent data supporting use of Jardiance in patients with heart failure with preserved ejection fraction, consideration should be given to changing his home regimen of blood sugar medications optimize his outcomes repeat echocardiogram in the hospital showed an ejection fraction of 55% with no significant abnormalities. On the day of discharge he states he is feeling well. Back to baseline. No chest pain or shortness of breath. He will have a 6-minute walk study prior to leaving the hospital and will be prescribed oxygen if qualifies. He has been counseled regarding fluid restriction to less than 2 L daily as well as sodium restriction. Avoid NSAIDs. Maintain compliance with medication regimen. Exam:     Vitals:  Vitals:    04/23/22 2345 04/24/22 0445 04/24/22 0755 04/24/22 0932   BP: 117/61 118/68  116/71   Pulse: 64 58  66   Resp: 16 16  16   Temp: 98.7 °F (37.1 °C) 98.4 °F (36.9 °C)  97.9 °F (36.6 °C)   TempSrc: Axillary Axillary  Oral   SpO2: 91% 91% 92% 97%   Weight:  (!) 333 lb (151 kg)     Height:         Weight: Weight: (!) 333 lb (151 kg)     24 hour intake/output:    Intake/Output Summary (Last 24 hours) at 4/24/2022 1047  Last data filed at 4/23/2022 2305  Gross per 24 hour   Intake 720 ml   Output 2375 ml   Net -1655 ml         General appearance:  No apparent distress, appears stated age and cooperative. HEENT:  Normal cephalic, atraumatic without obvious deformity. Pupils equal, round, and reactive to light. Extra ocular muscles intact. Conjunctivae/corneas clear. Neck: Supple, with full range of motion. No jugular venous distention. Trachea midline. Respiratory:  Normal respiratory effort. Clear to auscultation, bilaterally without Rales/Wheezes/Rhonchi. Cardiovascular:  Regular rate and rhythm with normal S1/S2 without murmurs, rubs or gallops. Abdomen: Soft, non-tender, non-distended with normal bowel sounds. Musculoskeletal:  No clubbing, cyanosis or edema bilaterally. Full range of motion without deformity. Skin: Skin color, texture, turgor normal.  No rashes or lesions. Neurologic:  Neurovascularly intact without any focal sensory/motor deficits.  Cranial nerves: II-XII intact, grossly non-focal.  Psychiatric:  Alert and oriented, thought content appropriate, normal insight  Capillary Refill: Brisk,< 3 seconds   Peripheral Pulses: +2 palpable, equal bilaterally       Labs: For convenience and continuity at follow-up the following most recent labs are provided:      CBC:    Lab Results   Component Value Date    WBC 4.2 04/24/2022    HGB 6.8 04/24/2022    HCT 23.3 04/24/2022     04/24/2022       Renal:    Lab Results   Component Value Date     04/24/2022    K 3.3 04/24/2022     04/24/2022    CO2 25 04/24/2022    BUN 33 04/24/2022    CREATININE 2.8 04/24/2022    CALCIUM 8.4 04/24/2022    PHOS 4.1 03/30/2022         Significant Diagnostic Studies    Radiology:   XR CHEST PORTABLE   Final Result   1. Moderate cardiomegaly. Tiny bilateral pleural effusions. 2. Mild interstitial pneumonia/edema both mid and lower lung fields. Cannot exclude incipient CHF. **This report has been created using voice recognition software. It may contain minor errors which are inherent in voice recognition technology. **      Final report electronically signed by Dr. Blaire Machado on 4/20/2022 1:19 PM             Consults:     IP CONSULT TO RESPIRATORY CARE  IP CONSULT TO CARDIAC REHAB    Disposition:    [x] Home       [] TCU       [] Rehab       [] Psych       [] SNF       [] [place]       [] Other-    Condition at Discharge: Stable    Code Status:  Full Code     Patient Instructions:    Discharge lab work: bmp in 3-4 days  Activity: activity as tolerated  Diet: ADULT DIET; Regular; 3 carb choices (45 gm/meal);  Low Sodium (2 gm); 2000 ml      Follow-up visits:   DO Neha Gómez Robbi [facility] [address]  [address] [address] [phone]    In 3 days  Office closed, left message to call and set up 7-10 day follow up         Discharge Medications:        Medication List      CHANGE how you take these medications    ipratropium-albuterol 0.5-2.5 (3) MG/3ML Soln nebulizer solution  Commonly known as: DUONEB  Inhale 3 mLs into the lungs every 4 hours as needed for Shortness of Breath  What changed: additional instructions     oxybutynin 5 MG extended release tablet  Commonly known as: DITROPAN-XL  Take 1 tablet by mouth at bedtime  What changed:   · medication strength  · how much to take  · when to take this     potassium chloride 10 MEQ extended release tablet  Commonly known as: K-Tab  Take 1 tablet by mouth daily  What changed: when to take this        CONTINUE taking these medications    albuterol sulfate  (90 Base) MCG/ACT inhaler  Inhale 2 puffs into the lungs every 6 hours as needed for Wheezing or Shortness of Breath     amitriptyline 10 MG tablet  Commonly known as: ELAVIL     amLODIPine 10 MG tablet  Commonly known as: NORVASC  Take 1 tablet by mouth daily     aspirin EC 81 MG EC tablet     azelastine 0.1 % nasal spray  Commonly known as: ASTELIN     Breo Ellipta 200-25 MCG/INH Aepb inhaler  Generic drug: Fluticasone furoate-vilanterol  Inhale 1 puff into the lungs daily     bumetanide 1 MG tablet  Commonly known as: BUMEX  Take 2 tablets by mouth daily AND 1 tablet as needed. carvedilol 25 MG tablet  Commonly known as: Coreg  Take 2 tablets by mouth 2 times daily     CPAP Machine Misc  by Does not apply route Please change bipap 17/13 cm H20.     doxepin 25 MG capsule  Commonly known as: SINEQUAN  Take 2 capsules by mouth nightly     gabapentin 300 MG capsule  Commonly known as: NEURONTIN  Take 1 capsule by mouth nightly for 30 days.      glimepiride 4 MG tablet  Commonly known as: AMARYL     hydrALAZINE 25 MG tablet  Commonly known as: APRESOLINE  Take 1 tablet by mouth every 8 hours     levothyroxine 175 MCG tablet  Commonly known as: SYNTHROID  Take 1 tablet by mouth Daily     melatonin 3 MG Tabs tablet  Take 1.5 tablets by mouth nightly as needed (Sleep)     montelukast 10 MG tablet  Commonly known as: SINGULAIR  Take 1 tablet by mouth nightly     nitroGLYCERIN 0.4 MG SL tablet  Commonly known as: Nitrostat  Place 1 tablet

## 2022-04-24 NOTE — PROGRESS NOTES
A home oxygen evaluation has been completed. [x]Patient is an inpatient. It is expected that the patient will be discharged within the next 48 hours. Qualified provider to write orders for possible sleep study or home oxygen prescription. Social service/care managers will arrange for home oxygen if ordered. If patient is active, arrange for Home Medical supplier to assess for Oxygen Conserving Device per pulse oximetry. []Patient is an outpatient. Results will be faxed to the ordering provider. Qualified provider to write orders for possible sleep study or home oxygen prescription. Patient was placed on room air for 30 minutes. SpO2 was 92 % on room air at rest. Patients SpO2 was 89% or above and did not qualify for home oxygen. Patient was walked for 6 minutes. SpO2 was 90 % during walking. Patients SpO2 was 89% or above and did not qualify for home oxygen. Patient does have a positive pressure airway device at home. Patient is  diagnosed with Obstructive Sleep Apnea. Patient will not be set up/instructed on a nocturnal study. Results will be given to qualified provider. Note: For any SpO2 at 38% see policy and procedure for possible qualifications.

## 2022-04-24 NOTE — PLAN OF CARE
Problem: Respiratory - Adult  Goal: Clear lung sounds  Outcome: Progressing    Cont breathing tx's to increase aeration, improve breath sounds and decrease WOB.

## 2022-04-24 NOTE — RT PROTOCOL NOTE
RT Inhaler-Nebulizer Bronchodilator Protocol Note    There is a bronchodilator order in the chart from a provider indicating to follow the RT Bronchodilator Protocol and there is an Initiate RT Inhaler-Nebulizer Bronchodilator Protocol order as well (see protocol at bottom of note). CXR Findings:  No results found. The findings from the last RT Protocol Assessment were as follows:   History Pulmonary Disease: None or smoker <15 pack years  Respiratory Pattern: Dyspnea on exertion or RR 21-25 bpm  Breath Sounds: Slightly diminished and/or crackles  Cough: Strong, spontaneous, non-productive  Indication for Bronchodilator Therapy: Decreased or absent breath sounds  Bronchodilator Assessment Score: 4    Aerosolized bronchodilator medication orders have been revised according to the RT Inhaler-Nebulizer Bronchodilator Protocol below. Respiratory Therapist to perform RT Therapy Protocol Assessment initially then follow the protocol. Repeat RT Therapy Protocol Assessment PRN for score 0-3 or on second treatment, BID, and PRN for scores above 3. No Indications - adjust the frequency to every 6 hours PRN wheezing or bronchospasm, if no treatments needed after 48 hours then discontinue using Per Protocol order mode. If indication present, adjust the RT bronchodilator orders based on the Bronchodilator Assessment Score as indicated below. Use Inhaler orders unless patient has one or more of the following: on home nebulizer, not able to hold breath for 10 seconds, is not alert and oriented, cannot activate and use MDI correctly, or respiratory rate 25 breaths per minute or more, then use the equivalent nebulizer order(s) with same Frequency and PRN reasons based on the score. If a patient is on this medication at home then do not decrease Frequency below that used at home.     0-3 - enter or revise RT bronchodilator order(s) to equivalent RT Bronchodilator order with Frequency of every 4 hours PRN for wheezing or increased work of breathing using Per Protocol order mode. 4-6 - enter or revise RT Bronchodilator order(s) to two equivalent RT bronchodilator orders with one order with BID Frequency and one order with Frequency of every 4 hours PRN wheezing or increased work of breathing using Per Protocol order mode. 7-10 - enter or revise RT Bronchodilator order(s) to two equivalent RT bronchodilator orders with one order with TID Frequency and one order with Frequency of every 4 hours PRN wheezing or increased work of breathing using Per Protocol order mode. 11-13 - enter or revise RT Bronchodilator order(s) to one equivalent RT bronchodilator order with QID Frequency and an Albuterol order with Frequency of every 4 hours PRN wheezing or increased work of breathing using Per Protocol order mode. Greater than 13 - enter or revise RT Bronchodilator order(s) to one equivalent RT bronchodilator order with every 4 hours Frequency and an Albuterol order with Frequency of every 2 hours PRN wheezing or increased work of breathing using Per Protocol order mode. RT to enter RT Home Evaluation for COPD & MDI Assessment order using Per Protocol order mode.     Electronically signed by Shirley Collier RCP on 4/24/2022 at 8:05 AM

## 2022-04-24 NOTE — CARE COORDINATION
4/24/22, 11:35 AM EDT    Home with wife. Patient goals/plan/ treatment preferences discussed by  and . Patient goals/plan/ treatment preferences reviewed with patient/ family. Patient/ family verbalize understanding of discharge plan and are in agreement with goal/plan/treatment preferences. Understanding was demonstrated using the teach back method. AVS provided by RN at time of discharge, which includes all necessary medical information pertaining to the patients current course of illness, treatment, post-discharge goals of care, and treatment preferences.         IMM Letter  IMM Letter given to Patient/Family/Significant other/Guardian/POA/by[de-identified] Kevin Wagner CM  IMM Letter date given[de-identified] 04/22/22  IMM Letter time given[de-identified] 3109

## 2022-04-24 NOTE — DISCHARGE INSTR - DIET

## 2022-04-25 ENCOUNTER — TELEPHONE (OUTPATIENT)
Dept: CARDIOLOGY CLINIC | Age: 62
End: 2022-04-25

## 2022-04-25 DIAGNOSIS — R09.02 HYPOXIA: ICD-10-CM

## 2022-04-25 DIAGNOSIS — I50.32 CHF (CONGESTIVE HEART FAILURE), NYHA CLASS II, CHRONIC, DIASTOLIC (HCC): Primary | ICD-10-CM

## 2022-04-25 NOTE — PROGRESS NOTES
Inpatient Cardiac Rehabilitation Consult    Received consult for Phase II Cardiac Rehabilitation. Patient does not meet qualifications for cardiac rehabilitation for CHF due to EF=55%.

## 2022-04-25 NOTE — TELEPHONE ENCOUNTER
Called and spoke to pt - pt called in, inquiring about CardioMEMs reading. PAD 30 today (up from 32 from in house)  Pt discharged from hospital yesterday  Overall feeling about the same. Concerned about oxygen - states he has pulse ox and he is still dropping into 70-80s w/ exertion. Of note Home O2 eval was done but pt questions accuracy - states he dropped during 6 minute walk and told to stop till O2 recovered. Wt is stable. No swelling. Really watching fluid and sodium intake. Hgb concerning - still running low, 7-8. Will reach out to [facility] if feels all CKD related or if Hematology referral warranted.    Also will check w/ respiratory on repeating Home O2 eval.

## 2022-04-26 ENCOUNTER — TELEPHONE (OUTPATIENT)
Dept: NEPHROLOGY | Age: 62
End: 2022-04-26

## 2022-04-26 DIAGNOSIS — N18.30 ANEMIA IN STAGE 3 CHRONIC KIDNEY DISEASE, UNSPECIFIED WHETHER STAGE 3A OR 3B CKD (HCC): Primary | ICD-10-CM

## 2022-04-26 DIAGNOSIS — D63.1 ANEMIA IN STAGE 3 CHRONIC KIDNEY DISEASE, UNSPECIFIED WHETHER STAGE 3A OR 3B CKD (HCC): Primary | ICD-10-CM

## 2022-04-26 NOTE — TELEPHONE ENCOUNTER
----- Message from Fran Gomez MD sent at 4/25/2022  6:24 PM EDT -----  Check iron saturation, ferritin  Aranesp 100 mcg SQ one dose

## 2022-04-26 NOTE — TELEPHONE ENCOUNTER
Spoke to Rubi Brown at [facility] [email or web address] [facility], she informed me that they prefer Retacrit to be used first over Aranesp.  Please advise

## 2022-04-26 NOTE — TELEPHONE ENCOUNTER
Call and let Xander Mares know I spoke w/ Respiratory - they would be ok w/ doing repeat Home O2 eval  If agreeable will order Home O2 eval

## 2022-04-26 NOTE — TELEPHONE ENCOUNTER
Spoke to pt, he understood that he needs Aranesp injection and repeat labs. Wants the injection at [facility]. Will need a prior auth before Aranesp is scheduled. Order already signed. Lab orders pending.

## 2022-04-27 NOTE — TELEPHONE ENCOUNTER
Spoke to Lalit at [facility] [employer] [facility]. Will receive decision in 72 hours. Case #[id number]    Faxing clinical information to [phone].

## 2022-04-27 NOTE — TELEPHONE ENCOUNTER
Left message for patient   Appointment sched 5/2 @ 130 arrive at 115 at [facility]

## 2022-04-27 NOTE — TELEPHONE ENCOUNTER
Ok please send Retacrit 10,000 units weekly x 4 weeks  Weekly H/H   Hold retacrit if H/H >10    Cancel aranesp order.

## 2022-04-28 NOTE — TELEPHONE ENCOUNTER
Received approval from [facility] for TAY [address], approval [id number] and effective 4/27/22 to 10/24/22. Called Central Scheduling spoke to Rosemary Burr is scheduled for 5/2/22 at 2:30 am in [facility]. Pt is aware.

## 2022-05-02 ENCOUNTER — HOSPITAL ENCOUNTER (OUTPATIENT)
Dept: RESPIRATORY THERAPY | Age: 62
Discharge: HOME | End: 2022-05-02
Payer: MEDICARE

## 2022-05-02 ENCOUNTER — APPOINTMENT (OUTPATIENT)
Dept: GENERAL RADIOLOGY | Age: 62
End: 2022-05-02
Payer: MEDICARE

## 2022-05-02 ENCOUNTER — HOSPITAL ENCOUNTER (OUTPATIENT)
Dept: GENERAL RADIOLOGY | Age: 62
Discharge: HOME | End: 2022-05-02
Payer: MEDICARE

## 2022-05-02 VITALS
HEART RATE: 71 BPM | SYSTOLIC BLOOD PRESSURE: 144 MMHG | DIASTOLIC BLOOD PRESSURE: 72 MMHG | TEMPERATURE: 97.1 F | OXYGEN SATURATION: 96 % | RESPIRATION RATE: 18 BRPM

## 2022-05-02 VITALS — OXYGEN SATURATION: 92 %

## 2022-05-02 DIAGNOSIS — N18.30 ANEMIA IN STAGE 3 CHRONIC KIDNEY DISEASE, UNSPECIFIED WHETHER STAGE 3A OR 3B CKD (HCC): ICD-10-CM

## 2022-05-02 DIAGNOSIS — D63.1 ANEMIA IN STAGE 3 CHRONIC KIDNEY DISEASE, UNSPECIFIED WHETHER STAGE 3A OR 3B CKD (HCC): ICD-10-CM

## 2022-05-02 DIAGNOSIS — N17.9 AKI (ACUTE KIDNEY INJURY) (HCC): ICD-10-CM

## 2022-05-02 DIAGNOSIS — N18.31 STAGE 3A CHRONIC KIDNEY DISEASE (HCC): Primary | ICD-10-CM

## 2022-05-02 LAB
HCT VFR BLD CALC: 28.7 % (ref 42–52)
HEMOGLOBIN: 8.9 GM/DL (ref 14–18)

## 2022-05-02 PROCEDURE — 85014 HEMATOCRIT: CPT

## 2022-05-02 PROCEDURE — 36415 COLL VENOUS BLD VENIPUNCTURE: CPT

## 2022-05-02 PROCEDURE — 96372 THER/PROPH/DIAG INJ SC/IM: CPT

## 2022-05-02 PROCEDURE — 82728 ASSAY OF FERRITIN: CPT

## 2022-05-02 PROCEDURE — 83540 ASSAY OF IRON: CPT

## 2022-05-02 PROCEDURE — 85018 HEMOGLOBIN: CPT

## 2022-05-02 PROCEDURE — 83550 IRON BINDING TEST: CPT

## 2022-05-02 PROCEDURE — 94761 N-INVAS EAR/PLS OXIMETRY MLT: CPT

## 2022-05-02 PROCEDURE — 83036 HEMOGLOBIN GLYCOSYLATED A1C: CPT

## 2022-05-02 PROCEDURE — 2700000000 HC OXYGEN THERAPY PER DAY

## 2022-05-02 PROCEDURE — 6360000002 HC RX W HCPCS: Performed by: INTERNAL MEDICINE

## 2022-05-02 RX ORDER — DIPHENHYDRAMINE HYDROCHLORIDE 50 MG/ML
50 INJECTION INTRAMUSCULAR; INTRAVENOUS
Status: CANCELLED | OUTPATIENT
Start: 2022-05-08

## 2022-05-02 RX ORDER — ACETAMINOPHEN 325 MG/1
650 TABLET ORAL
Status: CANCELLED | OUTPATIENT
Start: 2022-05-08

## 2022-05-02 RX ORDER — ACETAMINOPHEN 325 MG/1
650 TABLET ORAL
Status: CANCELLED | OUTPATIENT
Start: 2022-05-02

## 2022-05-02 RX ORDER — SODIUM CHLORIDE 9 MG/ML
INJECTION, SOLUTION INTRAVENOUS CONTINUOUS
Status: CANCELLED | OUTPATIENT
Start: 2022-05-08

## 2022-05-02 RX ORDER — ALBUTEROL SULFATE 90 UG/1
4 AEROSOL, METERED RESPIRATORY (INHALATION) PRN
Status: CANCELLED | OUTPATIENT
Start: 2022-05-02

## 2022-05-02 RX ORDER — ALBUTEROL SULFATE 90 UG/1
4 AEROSOL, METERED RESPIRATORY (INHALATION) PRN
Status: CANCELLED | OUTPATIENT
Start: 2022-05-08

## 2022-05-02 RX ORDER — ONDANSETRON 2 MG/ML
8 INJECTION INTRAMUSCULAR; INTRAVENOUS
Status: CANCELLED | OUTPATIENT
Start: 2022-05-08

## 2022-05-02 RX ORDER — ONDANSETRON 2 MG/ML
8 INJECTION INTRAMUSCULAR; INTRAVENOUS
Status: CANCELLED | OUTPATIENT
Start: 2022-05-02

## 2022-05-02 RX ORDER — DIPHENHYDRAMINE HYDROCHLORIDE 50 MG/ML
50 INJECTION INTRAMUSCULAR; INTRAVENOUS
Status: CANCELLED | OUTPATIENT
Start: 2022-05-02

## 2022-05-02 RX ORDER — SODIUM CHLORIDE 9 MG/ML
INJECTION, SOLUTION INTRAVENOUS CONTINUOUS
Status: CANCELLED | OUTPATIENT
Start: 2022-05-02

## 2022-05-02 RX ADMIN — EPOETIN ALFA-EPBX 10000 UNITS: 10000 INJECTION, SOLUTION INTRAVENOUS; SUBCUTANEOUS at 15:19

## 2022-05-02 NOTE — PROGRESS NOTES
Patient walked to triage room for Retacrit injection, patient vital signs and assessment, lab into room to draw blood, patient has no other needs.

## 2022-05-02 NOTE — PROGRESS NOTES
Patient left in stable condition, blood pressure and pulse noted in chart, patient feels fine and used his walker to leave. No other needs.

## 2022-05-02 NOTE — PROGRESS NOTES
__met__ Safety   GOAL: Patient will have ID or Allergy band on in the Urgent Care       (Environmental)   [place] to environment   Ensure ID band is correct and in place/ allergy band as needed   Assess for fall risk   Initiate fall precautions as applicable (fall band, side rails, etc.)   Call light within reach   Bed in low position/ wheels locked    __met__ Pain   GOAL:  Patient pain will be under control while here in the Urgent Care      Assess pain level and characteristics   Administer analgesics as ordered   Assess effectiveness of pain management and report to MD as needed    _met__ Knowledge Deficit:   GOAL: Patient will be educated on the medication they are receiving here in the Urgent Care   Assess baseline knowledge   Provide teaching at level of understanding   Provide teaching via preferred learning method   Evaluate teaching effectiveness    __met__ Hemodynamic/Respiratory Status:   GOAL: Patient vital signs will be assessed and monitored in the Urgent Care       (Pre and Post Procedure Monitoring)   Assess/Monitor vital signs and LOC   Assess Baseline SpO2 prior to any sedation   Obtain weight/height   Assess vital signs/ LOC until patient meets discharge criteria   Monitor procedure site and notify MD of any issues      CARE PLAN END DATE: May 2, 2022

## 2022-05-03 ENCOUNTER — OFFICE VISIT (OUTPATIENT)
Dept: SURGERY | Age: 62
End: 2022-05-03

## 2022-05-03 ENCOUNTER — TELEPHONE (OUTPATIENT)
Dept: CARDIOLOGY CLINIC | Age: 62
End: 2022-05-03

## 2022-05-03 VITALS
OXYGEN SATURATION: 94 % | SYSTOLIC BLOOD PRESSURE: 134 MMHG | BODY MASS INDEX: 41.75 KG/M2 | DIASTOLIC BLOOD PRESSURE: 84 MMHG | TEMPERATURE: 97.7 F | HEART RATE: 79 BPM | WEIGHT: 315 LBS | RESPIRATION RATE: 20 BRPM | HEIGHT: 73 IN

## 2022-05-03 DIAGNOSIS — R09.02 HYPOXIA: ICD-10-CM

## 2022-05-03 DIAGNOSIS — Z09 POSTOPERATIVE EXAMINATION: Primary | ICD-10-CM

## 2022-05-03 DIAGNOSIS — I50.32 CHF (CONGESTIVE HEART FAILURE), NYHA CLASS II, CHRONIC, DIASTOLIC (HCC): Primary | ICD-10-CM

## 2022-05-03 LAB
AVERAGE GLUCOSE: 81 MG/DL (ref 70–126)
FERRITIN: 253 NG/ML (ref 22–322)
HBA1C MFR BLD: 4.7 % (ref 4.4–6.4)
IRON SATURATION: 9 % (ref 20–50)
IRON: 23 UG/DL (ref 65–195)
TOTAL IRON BINDING CAPACITY: 263 UG/DL (ref 171–450)

## 2022-05-03 PROCEDURE — 99024 POSTOP FOLLOW-UP VISIT: CPT | Performed by: NURSE PRACTITIONER

## 2022-05-03 ASSESSMENT — ENCOUNTER SYMPTOMS: SHORTNESS OF BREATH: 1

## 2022-05-03 NOTE — PROGRESS NOTES
[address] [facility]  [address] [facility] [address]  Dept: [phone]  Dept Fax: [phone]  Loc: [phone]    Visit Date: 5/3/2022       Dannie Haynes is a 63 y.o. male who presents today for:  Chief Complaint   Patient presents with    Post-Op Check     s/p Removal of portion of infected mesh, Lysis of significant adhesions-3/25/2022 Last seen in the office on 4/19/2022    Follow-Up from [facility] on 4/24/2022-CHF and respiratory failure       HPI:     Scott Hunt presents today for a post op check. Today He has no surgical complaints. He is doing rather well, incision looks good, no erythema no drainage. He is tolerating meals, denies N&V and having normal stools. The night fevers and chills have resolved. Continue lifting restrictions as discussed. There is no need for a follow up appt, please call if any questions or concerns. Past Medical History:   Diagnosis Date    Arthritis     Back problem     back pain-sees [facility] pain mgmt    Bladder disease     Dr. Billie Locke CHF with unknown LVEF ([facility] [address] 05/24/2021    Dr. George/CHF clinic    Constipation     Diabetes mellitus ([facility] [address]     Dr. Lg Guzman Hypertension     Hypertensive emergency 4/11/2019    Hypertensive urgency 4/13/2019    Sleep apnea     has bipap-sees AZRA Knapp CNP    Thyroid disease       Past Surgical History:   Procedure Laterality Date    ABDOMEN SURGERY      ABDOMEN SURGERY N/A 3/25/2022    ABDOMINAL LYSIS OF ADHESIONS, EXPLANT OF INFECTED MESH performed by Rui Kennedy MD at [place] [address]      no stents    CARPAL TUNNEL RELEASE Bilateral     COLONOSCOPY  2017    Dr. Derrick Toribio, [address]      x3 surgeries with 14 repairs    KNEE SURGERY Right 1980's    cartilage    GA EXPLORATORY OF ABDOMEN N/A 2/5/2018    ABDOMINAL EXPLORATION WITH LYSIS OF COMPLICATED ADHESIONS WITH AN EXTENDED RIGHT COLON RESECTION performed by To Boothe MD at [address] [place] [address] History   Problem Relation Age of Onset    Cancer Mother         breast    Heart Disease Father         bladder, lung    Cancer Father     Stroke Brother     Heart Attack Brother     Prostate Cancer Brother     Diabetes Paternal Grandmother     Arthritis Brother     High Blood Pressure Neg Hx      Social History     Tobacco Use    Smoking status: Former Smoker     Types: Pipe    Smokeless tobacco: Current User     Types: Chew    Tobacco comment: quit pipe over 30 yrs ago   Substance Use Topics    Alcohol use: No     Alcohol/week: 0.0 standard drinks     Comment: quit        Current Outpatient Medications   Medication Sig Dispense Refill    oxybutynin (DITROPAN-XL) 5 MG extended release tablet Take 1 tablet by mouth at bedtime 30 tablet 0    amitriptyline (ELAVIL) 10 MG tablet Take 10 mg by mouth 2 times daily      spironolactone (ALDACTONE) 50 MG tablet Take 50 mg by mouth 2 times daily      bumetanide (BUMEX) 1 MG tablet Take 2 tablets by mouth daily AND 1 tablet as needed. 60 tablet 6    potassium chloride (K-TAB) 10 MEQ extended release tablet Take 1 tablet by mouth daily (Patient taking differently: Take 10 mEq by mouth 2 times daily ) 60 tablet 3    oxyCODONE-acetaminophen (PERCOCET)  MG per tablet Take 1 tablet by mouth every 8 hours as needed for Pain for up to 30 days. Intended supply: 30 days 90 tablet 0    gabapentin (NEURONTIN) 300 MG capsule Take 1 capsule by mouth nightly for 30 days.  30 capsule 0    tamsulosin (FLOMAX) 0.4 MG capsule Take 1 capsule by mouth nightly 90 capsule 3    carvedilol (COREG) 25 MG tablet Take 2 tablets by mouth 2 times daily 60 tablet 3    hydrALAZINE (APRESOLINE) 25 MG tablet Take 1 tablet by mouth every 8 hours 90 tablet 3    levothyroxine (SYNTHROID) 175 MCG tablet Take 1 tablet by mouth Daily 30 tablet 3    amLODIPine (NORVASC) 10 MG tablet Take 1 tablet by mouth daily 30 tablet 3    melatonin 3 MG TABS tablet Take 1.5 tablets by mouth nightly as needed (Sleep) 45 tablet 3    ipratropium-albuterol (DUONEB) 0.5-2.5 (3) MG/3ML SOLN nebulizer solution Inhale 3 mLs into the lungs every 4 hours as needed for Shortness of Breath (Patient taking differently: Inhale 1 vial into the lungs every 4 hours as needed for Shortness of Breath Usually uses at night) 360 mL 1    Fluticasone furoate-vilanterol (BREO ELLIPTA) 200-25 MCG/INH AEPB inhaler Inhale 1 puff into the lungs daily 3 each 3    montelukast (SINGULAIR) 10 MG tablet Take 1 tablet by mouth nightly 90 tablet 3    albuterol sulfate  (90 Base) MCG/ACT inhaler Inhale 2 puffs into the lungs every 6 hours as needed for Wheezing or Shortness of Breath 1 each 5    CPAP Machine MISC by Does not apply route Please change bipap 17/13 cm H20. 1 each 0    rOPINIRole (REQUIP) 1 MG tablet Take 1 pill in afternoon and 2 at bedtime 270 tablet 1    doxepin (SINEQUAN) 25 MG capsule Take 2 capsules by mouth nightly 60 capsule 3    aspirin EC 81 MG EC tablet Take 1 tablet by mouth daily 90 tablet 1    nitroGLYCERIN (NITROSTAT) 0.4 MG SL tablet Place 1 tablet under the tongue every 5 minutes as needed for Chest pain (up to 3 doses) 25 tablet 3    glimepiride (AMARYL) 4 MG tablet Take 4 mg by mouth daily       azelastine (ASTELIN) 137 MCG/SPRAY nasal spray 1 spray by Nasal route as needed. Use in each nostril as directed       No current facility-administered medications for this visit.        Allergies   Allergen Reactions    Shellfish-Derived Products Swelling     Tolerates IV dye without any problems      Pcn [Penicillins] Itching    Succinylcholine      Pseudocholinesterase Deficiency    Sulfa Antibiotics Itching    Morphine Nausea And Vomiting     \"head swimming, skin crawling\"    Tape [Adhesive Tape] Rash       Subjective:      Review of Systems   Constitutional: Positive for activity change. HENT: Negative. Respiratory: Positive for shortness of breath. Cardiovascular: Negative. Gastrointestinal: Abdominal pain: postoperative  Nausea: with antibiotics    Neurological: Negative. Objective:     /84 (Site: Left Upper Arm, Position: Sitting, Cuff Size: Large Adult)   Pulse 79   Temp 97.7 °F (36.5 °C) (Temporal)   Resp 20   Ht 6' 1\" (1.854 m)   Wt (!) 325 lb 14.4 oz (147.8 kg)   SpO2 94%   BMI 43.00 kg/m²     Wt Readings from Last 3 Encounters:   05/03/22 (!) 325 lb 14.4 oz (147.8 kg)   04/24/22 (!) 333 lb (151 kg)   04/20/22 (!) 335 lb 6.4 oz (152.1 kg)       Physical Exam  Vitals reviewed. Constitutional:       Appearance: He is well-developed. He is obese. HENT:      Head: Normocephalic. Eyes:      Pupils: Pupils are equal, round, and reactive to light. Cardiovascular:      Rate and Rhythm: Normal rate. Pulmonary:      Effort: Pulmonary effort is normal.   Abdominal:      Palpations: Abdomen is soft. Musculoskeletal:         General: Normal range of motion. Cervical back: Normal range of motion. Skin:     General: Skin is warm and dry. Neurological:      Mental Status: He is alert and oriented to person, place, and time. Assessment/Plan:     Aziza Pan was seen today for post-op check and follow-up from hospital.    Diagnoses and all orders for this visit:    Postoperative examination        Return if symptoms worsen or fail to improve. Patient Instructions       Restrictions x 2 weeks       Discusseduse, benefit, and side effects of prescribed medications. All patient questionsanswered. Pt voiced understanding.      Electronically signed by SAHIL Amaya CNP on 5/3/2022 at 12:44 PM

## 2022-05-04 ENCOUNTER — TELEPHONE (OUTPATIENT)
Dept: NEPHROLOGY | Age: 62
End: 2022-05-04

## 2022-05-04 DIAGNOSIS — N18.30 ANEMIA IN STAGE 3 CHRONIC KIDNEY DISEASE, UNSPECIFIED WHETHER STAGE 3A OR 3B CKD (HCC): Primary | ICD-10-CM

## 2022-05-04 DIAGNOSIS — D63.1 ANEMIA IN STAGE 3 CHRONIC KIDNEY DISEASE, UNSPECIFIED WHETHER STAGE 3A OR 3B CKD (HCC): Primary | ICD-10-CM

## 2022-05-04 NOTE — TELEPHONE ENCOUNTER
Spoke with patient   He prefers [facility] home medical     Order, note, and eval faxed

## 2022-05-04 NOTE — TELEPHONE ENCOUNTER
Order placed for Home O2 - please fax order to whatever DME pt prefers    Home O2 eval:  Patient was placed on room air for >30 minutes (came in on room air). RT did observe SpO2 >90% for >5min. SpO2 was 96 % on room air at rest. Patients SpO2 was 89% or above and did not qualify for home oxygen. Patient was walked for 6 minutes. SpO2 was 87 % during walking. Patients SpO2 was below 89% and qualified for home oxygen. Oxygen was applied at 2 lpm via nasal cannula to maintain a SpO2 between 90-92% while walking. Actual SpO2 was 92-94 %.     Note: For any SpO2 at 76% see policy and procedure for possible qualifications.

## 2022-05-04 NOTE — TELEPHONE ENCOUNTER
Left message regarding Injectafer infusion and repeat labs. Nahomy Erickson will need a prior auth before scheduled. Taking Injectafer to [employer] for signature.

## 2022-05-04 NOTE — TELEPHONE ENCOUNTER
----- Message from Macrina Hair MD sent at 5/3/2022  7:52 PM EDT -----  Injectafer 750 mg IV weekly x 2 doses  H/H in 2 weeks.

## 2022-05-05 ENCOUNTER — OFFICE VISIT (OUTPATIENT)
Dept: CARDIOLOGY CLINIC | Age: 62
End: 2022-05-05
Payer: MEDICARE

## 2022-05-05 ENCOUNTER — TELEPHONE (OUTPATIENT)
Dept: CARDIOLOGY CLINIC | Age: 62
End: 2022-05-05

## 2022-05-05 VITALS
WEIGHT: 315 LBS | DIASTOLIC BLOOD PRESSURE: 70 MMHG | OXYGEN SATURATION: 95 % | SYSTOLIC BLOOD PRESSURE: 130 MMHG | HEIGHT: 73 IN | BODY MASS INDEX: 41.75 KG/M2 | HEART RATE: 80 BPM

## 2022-05-05 DIAGNOSIS — I10 ESSENTIAL HYPERTENSION: ICD-10-CM

## 2022-05-05 DIAGNOSIS — R09.02 HYPOXIA: ICD-10-CM

## 2022-05-05 DIAGNOSIS — D64.9 ANEMIA, UNSPECIFIED TYPE: ICD-10-CM

## 2022-05-05 DIAGNOSIS — N18.32 STAGE 3B CHRONIC KIDNEY DISEASE (HCC): ICD-10-CM

## 2022-05-05 DIAGNOSIS — I50.32 CHF (CONGESTIVE HEART FAILURE), NYHA CLASS II, CHRONIC, DIASTOLIC (HCC): Primary | ICD-10-CM

## 2022-05-05 LAB
ANION GAP SERPL CALCULATED.3IONS-SCNC: 12 MEQ/L (ref 8–16)
BUN BLDV-MCNC: 28 MG/DL (ref 7–22)
CALCIUM SERPL-MCNC: 8.6 MG/DL (ref 8.5–10.5)
CHLORIDE BLD-SCNC: 101 MEQ/L (ref 98–111)
CO2: 23 MEQ/L (ref 23–33)
CREAT SERPL-MCNC: 2.8 MG/DL (ref 0.4–1.2)
GFR SERPL CREATININE-BSD FRML MDRD: 23 ML/MIN/1.73M2
GLUCOSE BLD-MCNC: 143 MG/DL (ref 70–108)
POTASSIUM SERPL-SCNC: 4.9 MEQ/L (ref 3.5–5.2)
SODIUM BLD-SCNC: 136 MEQ/L (ref 135–145)

## 2022-05-05 PROCEDURE — G8427 DOCREV CUR MEDS BY ELIG CLIN: HCPCS | Performed by: NURSE PRACTITIONER

## 2022-05-05 PROCEDURE — 3017F COLORECTAL CA SCREEN DOC REV: CPT | Performed by: NURSE PRACTITIONER

## 2022-05-05 PROCEDURE — 36415 COLL VENOUS BLD VENIPUNCTURE: CPT | Performed by: NURSE PRACTITIONER

## 2022-05-05 PROCEDURE — G8417 CALC BMI ABV UP PARAM F/U: HCPCS | Performed by: NURSE PRACTITIONER

## 2022-05-05 PROCEDURE — 4004F PT TOBACCO SCREEN RCVD TLK: CPT | Performed by: NURSE PRACTITIONER

## 2022-05-05 PROCEDURE — 1111F DSCHRG MED/CURRENT MED MERGE: CPT | Performed by: NURSE PRACTITIONER

## 2022-05-05 PROCEDURE — 99214 OFFICE O/P EST MOD 30 MIN: CPT | Performed by: NURSE PRACTITIONER

## 2022-05-05 RX ORDER — GLUCOSAMINE SULFATE 500 MG
TABLET ORAL
Status: ON HOLD | COMMUNITY
Start: 2022-04-22 | End: 2022-07-19

## 2022-05-05 ASSESSMENT — ENCOUNTER SYMPTOMS
SHORTNESS OF BREATH: 1
COUGH: 0
ABDOMINAL DISTENTION: 0

## 2022-05-05 NOTE — PATIENT INSTRUCTIONS
You may receive a survey regarding the care you received during your visit. Your input is valuable to us. We encourage you to complete and return your survey. We hope you will choose us in the future for your healthcare needs.     Continue:  · Continue current medications  · Daily weights and record  · Fluid restriction of 2 Liters per day  · Limit sodium in diet to around 8210-5758 mg/day  · Monitor BP  · Activity as tolerated     Call the [facility] for any of the following symptoms: [phone]   Weight gain of 2-3 pounds in 1 day or 5 pounds in 1 week   Increased shortness of breath   Shortness of breath while laying down   Cough   Chest pain   Swelling in feet, ankles or legs   Tenderness or bloating in the abdomen   Fatigue    Decreased appetite or nausea    Confusion

## 2022-05-05 NOTE — PROGRESS NOTES
Heart Failure Clinic       Visit Date: 5/5/2022  Cardiologist:  Dr. Kelly Griffith  Primary Care Physician: Dr. Angela Trevino, DO    Melene Olszewski is a 63 y.o. male who presents today for:  Chief Complaint   Patient presents with    Congestive Heart Failure       HPI:   Melene Olszewski is a 63 y.o. male who presents to the office for a follow up patient visit in the heart failure clinic. Accompanied by no one  Lives w/ wife  Raising 7 and 11yr old grd dtrs     TYPE HF: HFrecoveredEF (40-45% - improved to 55-60%)  Device: no  HX: HTN, DM, LAURIE (CPAP), CKD Orysia Narbelen), Partial colectomy (2017), never smoker (wife does)  CardioMEMS implanted 6/16/21 - PAD goal 21-27mmHg, since admission 3/2022 (new anemia, CKD) - PAD goal 27-31  12/1/20= [facility] w/ Nallu = nonobstructive disease, elevated LVEDP, PCW 35mmHg     Dry Wt:  350     Hospitalization:  March 2022 x 2 weeks - Sepsis 2/2 abd wall - mesh. Surgery w/ Hixenbaugh 3/25/22. Worsening CKD - Nephrology following. Anemia - Hgb 7-8  OV 4/14 - 232# - fatigued. CardioMEMs PAD 33mmHg  Admitted 4/2022 - CHF - diuresed 10#  Today:  Looks good today  224# - just started Home O2 yesterday - feeling at least 75% better. Helps recover faster.   Walked from parking lot w/out \"huffing/puffing\"  CardioMEMs reviewed - PAD 29 mmHg today  Started Retacrit last week - appears Shena Michael ordered to get [facility] as well for low Hgb    Patient has:  Chest Pain: no  SOB: yes   Orthopnea/PND: no - in bed  LAURIE: compliant w/ Bipap  Edema: mild   Fatigue: yes   Abdominal bloating: no  Cough: yes   Appetite: good  Home weight: stable  Home blood pressure: stable    Past Medical History:   Diagnosis Date    Arthritis     Back problem     back pain-sees [facility] pain mgmt    Bladder disease     Dr. Chencho Martínez CHF with unknown LVEF ([facility] 05/24/2021    Dr. George/CHF clinic    Constipation     [facility] ([facility]     Dr. Radha Tyler Hypertension     Hypertensive emergency 4/11/2019    Hypertensive urgency 4/13/2019    Sleep apnea     has bipap-sees AZRA Olmedo CNP    Thyroid disease      Past Surgical History:   Procedure Laterality Date    ABDOMEN SURGERY      ABDOMEN SURGERY N/A 3/25/2022    ABDOMINAL LYSIS OF ADHESIONS, EXPLANT OF INFECTED MESH performed by Rancho Fairchild MD at [facility]      no stents    CARPAL TUNNEL RELEASE Bilateral     COLONOSCOPY  2017    Dr. Maria Del Carmen Cruz, [address]      x3 surgeries with 14 repairs    KNEE SURGERY Right 1980's    cartilage    IN EXPLORATORY OF ABDOMEN N/A 2/5/2018    ABDOMINAL EXPLORATION WITH LYSIS OF COMPLICATED ADHESIONS WITH AN EXTENDED RIGHT COLON RESECTION performed by Rancho Fairchild MD at [facility] History   Problem Relation Age of Onset    Cancer Mother         breast    Heart Disease Father         bladder, lung    Cancer Father     Stroke Brother     Heart Attack Brother     Prostate Cancer Brother     Diabetes Paternal Grandmother     Arthritis Brother     High Blood Pressure Neg Hx      Social History     Tobacco Use    Smoking status: Former Smoker     Types: Pipe    Smokeless tobacco: Current User     Types: Chew    Tobacco comment: quit pipe over 30 yrs ago   Substance Use Topics    Alcohol use: No     Alcohol/week: 0.0 standard drinks     Comment: quit     Current Outpatient Medications   Medication Sig Dispense Refill    spironolactone (ALDACTONE) 50 MG tablet Take 50 mg by mouth 2 times daily      bumetanide (BUMEX) 1 MG tablet Take 2 tablets by mouth daily AND 1 tablet as needed.  60 tablet 6    potassium chloride (K-TAB) 10 MEQ extended release tablet Take 1 tablet by mouth daily 60 tablet 3    CPAP Machine MISC by Does not apply route Please change bipap 17/13 cm H20. 1 each 0    RA MELATONIN 3-2 MG TABS TAKE 1 AND 1/2 TABLETS BY MOUTH NIGHTLY AS NEEDED FOR SLEEP      oxybutynin (DITROPAN-XL) 5 MG extended release tablet Take 1 tablet by mouth at bedtime 30 tablet 0    amitriptyline (ELAVIL) 10 MG tablet Take 10 mg by mouth 2 times daily      oxyCODONE-acetaminophen (PERCOCET)  MG per tablet Take 1 tablet by mouth every 8 hours as needed for Pain for up to 30 days. Intended supply: 30 days 90 tablet 0    gabapentin (NEURONTIN) 300 MG capsule Take 1 capsule by mouth nightly for 30 days.  30 capsule 0    tamsulosin (FLOMAX) 0.4 MG capsule Take 1 capsule by mouth nightly 90 capsule 3    carvedilol (COREG) 25 MG tablet Take 2 tablets by mouth 2 times daily 60 tablet 3    hydrALAZINE (APRESOLINE) 25 MG tablet Take 1 tablet by mouth every 8 hours 90 tablet 3    levothyroxine (SYNTHROID) 175 MCG tablet Take 1 tablet by mouth Daily 30 tablet 3    amLODIPine (NORVASC) 10 MG tablet Take 1 tablet by mouth daily 30 tablet 3    melatonin 3 MG TABS tablet Take 1.5 tablets by mouth nightly as needed (Sleep) 45 tablet 3    ipratropium-albuterol (DUONEB) 0.5-2.5 (3) MG/3ML SOLN nebulizer solution Inhale 3 mLs into the lungs every 4 hours as needed for Shortness of Breath (Patient taking differently: Inhale 1 vial into the lungs every 4 hours as needed for Shortness of Breath Usually uses at night) 360 mL 1    Fluticasone furoate-vilanterol (BREO ELLIPTA) 200-25 MCG/INH AEPB inhaler Inhale 1 puff into the lungs daily 3 each 3    montelukast (SINGULAIR) 10 MG tablet Take 1 tablet by mouth nightly 90 tablet 3    albuterol sulfate  (90 Base) MCG/ACT inhaler Inhale 2 puffs into the lungs every 6 hours as needed for Wheezing or Shortness of Breath 1 each 5    rOPINIRole (REQUIP) 1 MG tablet Take 1 pill in afternoon and 2 at bedtime 270 tablet 1    doxepin (SINEQUAN) 25 MG capsule Take 2 capsules by mouth nightly 60 capsule 3    aspirin EC 81 MG EC tablet Take 1 tablet by mouth daily 90 tablet 1    nitroGLYCERIN (NITROSTAT) 0.4 MG SL tablet Place 1 tablet under the tongue every 5 minutes as needed for Chest pain (up to 3 doses) 25 tablet 3    glimepiride (AMARYL) 4 MG tablet Take 4 mg by mouth daily       azelastine (ASTELIN) 137 MCG/SPRAY nasal spray 1 spray by Nasal route as needed. Use in each nostril as directed       No current facility-administered medications for this visit. Allergies   Allergen Reactions    Shellfish-Derived Products Swelling     Tolerates IV dye without any problems      Pcn [Penicillins] Itching    Succinylcholine      Pseudocholinesterase Deficiency    Sulfa Antibiotics Itching    Morphine Nausea And Vomiting     \"head swimming, skin crawling\"    Tape [Adhesive Tape] Rash       SUBJECTIVE:   Review of Systems   Constitutional: Positive for fatigue (improving). Negative for activity change and appetite change. Respiratory: Positive for shortness of breath (improving). Negative for cough. Cardiovascular: Negative for chest pain, palpitations and leg swelling. Gastrointestinal: Negative for abdominal distention. Neurological: Negative for weakness, light-headedness and headaches. Hematological: Negative for adenopathy. Psychiatric/Behavioral: Negative for sleep disturbance. OBJECTIVE:   Today's Vitals:  /70   Pulse 80   Ht 6' 1\" (1.854 m)   Wt (!) 324 lb (147 kg)   SpO2 95%   BMI 42.75 kg/m²     Physical Exam  Vitals reviewed. Constitutional:       General: He is not in acute distress. Appearance: Normal appearance. He is well-developed. He is not diaphoretic. HENT:      Head: Normocephalic and atraumatic. Eyes:      Conjunctiva/sclera: Conjunctivae normal.   Neck:      Comments: No JVD  Cardiovascular:      Rate and Rhythm: Normal rate and regular rhythm. Heart sounds: Normal heart sounds. No murmur heard. Pulmonary:      Effort: Pulmonary effort is normal. No respiratory distress. Breath sounds: Normal breath sounds. No wheezing or rales. Comments: O2 2L NC  Abdominal:      General: Bowel sounds are normal. There is no distension. Palpations: Abdomen is soft. Tenderness: There is no abdominal tenderness. Musculoskeletal:         General: Normal range of motion. Cervical back: Normal range of motion and neck supple. Right lower leg: No edema. Left lower leg: No edema. Skin:     General: Skin is warm and dry. Capillary Refill: Capillary refill takes less than 2 seconds. Neurological:      Mental Status: He is alert and oriented to person, place, and time. Coordination: Coordination normal.   Psychiatric:         Behavior: Behavior normal.       Wt Readings from Last 3 Encounters:   05/05/22 (!) 324 lb (147 kg)   05/03/22 (!) 325 lb 14.4 oz (147.8 kg)   04/24/22 (!) 333 lb (151 kg)     BP Readings from Last 3 Encounters:   05/05/22 130/70   05/03/22 134/84   05/02/22 (!) 144/72     Pulse Readings from Last 3 Encounters:   05/05/22 80   05/03/22 79   05/02/22 71     Body mass index is 42.75 kg/m². ECHO   Summary   Ejection fraction is visually estimated at 55%. Overall left ventricular function is normal.      Signature      ----------------------------------------------------------------   Electronically signed by Brielle Santos MD (Interpreting   physician) on 04/22/2022 at 03:37 PM   ----------------------------------------------------------------    ECHO:    Summary   Left ventricle size is normal.   Moderate concentric left ventricular hypertrophy.  Adamaris Collar were no regional wall motion abnormalities.   Ejection fraction is visually estimated in the range of 55% to 60%.       Signature      ----------------------------------------------------------------   Electronically signed by Lilliana Vásquez MD (Interpreting   [id number]) on 05/25/2021 at 04:48 PM   ----------------------------------------------------------------     CATH/STRESS:   RIGHT HEART CATHETERIZATION:  1.  RA is 25.  2.  RV is 80/23, 27.  3.  PA is 82/44, 59.  4.  Wedge pressure is 35.  5.  LV is 188/28, 36.  6.  AO was 205/126, 160.  7.  AO saturation is 91%. 8.  PA saturation is 63%. 9.  Cardiac output by Cherelle method is 5.9. 10.  Cardiac index is 2.1.     CORONARY ANATOMY:  1.  Right coronary artery is a dominant vessel.  The vessel is quite  large and has mild luminal irregularities in the proximal, mid, and  distal portions of the vessel; otherwise, it is patent. 2.  Left main is patent, gives rise to LAD and left circumflex arteries. 3.  Left circumflex artery is patent without any significant disease. 4.  LAD is patent in the proximal portion, mid portion there is a 30% to  40% stenosis followed by mild luminal irregularities in the distal  portion of the LAD. 5.  LV gram was not performed due to renal dysfunction.     The patient tolerated the procedure well without any significant issues  or complications.  Hemostasis was achieved with a Vasc Band device.     SUMMARY:  1.  Elevated right heart pressures with elevated LVEPD. 2.  Patent coronaries.     RECOMMENDATIONS:  1.  IV diuresis. 2.  Monitor electrolytes. 3.  Optimize heart failure therapy. 4.  Weight loss advised. 5.  Optimize sleep apnea therapies.   6.  Follow up in clinic in one to two weeks to evaluate symptoms  further.        Delphine Quintanilla MD     D: 12/06/2020 21:57:38        Results reviewed:  BNP: No results found for: BNP  CBC:   Lab Results   Component Value Date    WBC 4.2 04/24/2022    RBC 2.75 04/24/2022    RBC 5.15 08/12/2011    HGB 8.9 05/02/2022    HCT 28.7 05/02/2022     04/24/2022     CMP:    Lab Results   Component Value Date     04/24/2022    K 3.3 04/24/2022     04/24/2022    CO2 25 04/24/2022    BUN 33 04/24/2022    CREATININE 2.8 04/24/2022    LABGLOM 23 04/24/2022    GLUCOSE 87 04/24/2022    CALCIUM 8.4 04/24/2022     Hepatic Function Panel:    Lab Results   Component Value Date    ALKPHOS 104 04/20/2022    ALT 17 04/20/2022    AST 16 04/20/2022    PROT 7.3 04/20/2022    BILITOT 0.5 04/20/2022    BILIDIR <0.2 04/20/2022 LABALBU 3.0 04/20/2022     Magnesium:    Lab Results   Component Value Date    MG 1.9 04/24/2022     PT/INR:    Lab Results   Component Value Date    INR 1.14 06/16/2021     Lipids:    Lab Results   Component Value Date    TRIG 107 05/25/2021    HDL 35 05/25/2021    [address] 05/25/2021       ASSESSMENT AND PLAN:      Diagnosis Orders   1. CHF (congestive heart failure), NYHA class II, chronic, diastolic (HCC)  Basic Metabolic Panel    Basic Metabolic Panel   2. Hypoxia     3. Anemia, unspecified type     4. Stage 3b chronic kidney disease ([facility]     5. Essential hypertension       Continue:  GDMT:   ACE/ARB/ARNI - none - was on Entresto in past (stopped d/t CKD, low BPs)   BB - Coreg 25 BID   SGLT2 -  no  AA - stopped while ago - Aldactone 50 BID -apparently restarted at discharge on 4/24   Diuretic - Bumex 2mg/day  Vasodilator - Hydralazine 25 TID  Other - Norvasc  Stable, Euvolemia - looks good today   CardioMEMs has been elevated since beginning March - likely multifactorial d/t sepsis d/t infected mesh, worsening CKD, worsening Anemia  PAD is improved past few days - PAD 29mmHg - highest hes ran is 35mmHg. Anemia noted since March (averaging 7-9gm, prior was around 12-14) - unknown cause. Most likely Anemia of Chronic disease - no signs of current blood loss. Started Retacrit and plan to start Injectefer w/ Dr Coelho Confer = H/H in 2 weeks. Worsening CKD since March - new baseline appears around 2.0-2.4  Lab reviewed - last creat 2.8 at d/c on 4/24  Repeat blood work today - BMP  BMP in 2 weeks w/ H/H for [employer] adjust meds as needed pending blood work - (back on Aldactone)  BP/HR stable   Continue diet/fluid adherence  Continue daily wts.   F/U w/ Cardiology/Nallu in 3 mth  F/U in clinic in 6 mth    ADDENDUM  Labs reviewed - creat still 2.8, K+ 4.9  Discussed w/ Manav   Decrease Aldactone to 25/day   Repeat BMP in 1 week, not 2 weeks (can use lab slip that was given)    Tolerating above noted HF meds, no ill side effects noted. Will continue to monitor kidney function and electrolytes. Will optimize as tolerated. Pt is compliant w/ medications. Total visit time of 30 minutes has been spent with patient on education of symptoms, management, medication, and plan of care; as well as review of chart: labs, ECHO, radiology reports, etc.   I personally spent more then 50% of the appt time face to face with the patient. · Daily weights  · Fluid restriction of 2 Liters per day  · Limit sodium in diet to around 3054-5422 mg/day  · Monitor BP  · Activity as tolerated     Patient was instructed to call the Almaz Vasquez for any changes in symptoms as noted in AVS.      No follow-ups on file. or sooner if needed     Patient given educational materials - see patient instructions. We discussed the importance of weighing oneself and recording daily. We also discussed the importance of a low sodium diet, higher sodium foods to avoid and better low sodium food options. Patient verbalizes understanding of plan of care using teach back method, and is agreeable to the treatment plan.        Electronically signed by SAHIL Bucio CNP on 5/5/2022 at 2:27 PM

## 2022-05-05 NOTE — PROGRESS NOTES
Venipuncture obtained from [address] [place] [address]. Patient tolerated procedure without complications or complaints.

## 2022-05-06 RX ORDER — SPIRONOLACTONE 50 MG/1
25 TABLET, FILM COATED ORAL DAILY
Qty: 30 TABLET | Refills: 3
Start: 2022-05-06 | End: 2022-05-24

## 2022-05-06 NOTE — TELEPHONE ENCOUNTER
Labs reviewed - creat still 2.8, K+ 4.9  Discussed w/ Em   Decrease Aldactone to 25/day   Repeat BMP in 1 week, not 2 weeks (can use lab slip that was given)

## 2022-05-09 ENCOUNTER — HOSPITAL ENCOUNTER (OUTPATIENT)
Age: 62
Discharge: HOME | End: 2022-05-09
Payer: MEDICARE

## 2022-05-09 ENCOUNTER — HOSPITAL ENCOUNTER (OUTPATIENT)
Dept: GENERAL RADIOLOGY | Age: 62
Discharge: HOME | End: 2022-05-09
Payer: MEDICARE

## 2022-05-09 ENCOUNTER — HOSPITAL ENCOUNTER (OUTPATIENT)
Age: 62
End: 2022-05-09

## 2022-05-09 VITALS
WEIGHT: 314 LBS | BODY MASS INDEX: 41.62 KG/M2 | SYSTOLIC BLOOD PRESSURE: 143 MMHG | TEMPERATURE: 98.7 F | DIASTOLIC BLOOD PRESSURE: 72 MMHG | OXYGEN SATURATION: 99 % | HEIGHT: 73 IN | RESPIRATION RATE: 18 BRPM | HEART RATE: 79 BPM

## 2022-05-09 DIAGNOSIS — N18.31 STAGE 3A CHRONIC KIDNEY DISEASE (HCC): Primary | ICD-10-CM

## 2022-05-09 DIAGNOSIS — N18.31 STAGE 3A CHRONIC KIDNEY DISEASE (HCC): ICD-10-CM

## 2022-05-09 DIAGNOSIS — N17.9 AKI (ACUTE KIDNEY INJURY) (HCC): ICD-10-CM

## 2022-05-09 LAB
HCT VFR BLD CALC: 30.1 % (ref 42–52)
HEMOGLOBIN: 9.5 GM/DL (ref 14–18)
MCH RBC QN AUTO: 25.5 PG (ref 27–31)
MCHC RBC AUTO-ENTMCNC: 31.6 GM/DL (ref 33–37)
MCV RBC AUTO: 81 FL (ref 80–94)
PDW BLD-RTO: 18 % (ref 11.5–14.5)
PLATELET # BLD: 388 THOU/MM3 (ref 130–400)
PMV BLD AUTO: 7.8 FL (ref 7.4–10.4)
RBC # BLD: 3.72 MILL/MM3 (ref 4.7–6.1)
WBC # BLD: 6.9 THOU/MM3 (ref 4.8–10.8)

## 2022-05-09 PROCEDURE — 6360000002 HC RX W HCPCS: Performed by: INTERNAL MEDICINE

## 2022-05-09 PROCEDURE — 85027 COMPLETE CBC AUTOMATED: CPT

## 2022-05-09 PROCEDURE — 96372 THER/PROPH/DIAG INJ SC/IM: CPT

## 2022-05-09 PROCEDURE — 36415 COLL VENOUS BLD VENIPUNCTURE: CPT

## 2022-05-09 RX ORDER — ALBUTEROL SULFATE 90 UG/1
4 AEROSOL, METERED RESPIRATORY (INHALATION) PRN
Status: CANCELLED | OUTPATIENT
Start: 2022-05-16

## 2022-05-09 RX ORDER — DIPHENHYDRAMINE HYDROCHLORIDE 50 MG/ML
50 INJECTION INTRAMUSCULAR; INTRAVENOUS
Status: CANCELLED | OUTPATIENT
Start: 2022-05-16

## 2022-05-09 RX ORDER — ONDANSETRON 2 MG/ML
8 INJECTION INTRAMUSCULAR; INTRAVENOUS
Status: CANCELLED | OUTPATIENT
Start: 2022-05-16

## 2022-05-09 RX ORDER — SODIUM CHLORIDE 9 MG/ML
INJECTION, SOLUTION INTRAVENOUS CONTINUOUS
Status: CANCELLED | OUTPATIENT
Start: 2022-05-16

## 2022-05-09 RX ORDER — ACETAMINOPHEN 325 MG/1
650 TABLET ORAL
Status: CANCELLED | OUTPATIENT
Start: 2022-05-16

## 2022-05-09 RX ADMIN — EPOETIN ALFA-EPBX 10000 UNITS: 10000 INJECTION, SOLUTION INTRAVENOUS; SUBCUTANEOUS at 14:53

## 2022-05-09 NOTE — PROGRESS NOTES
__met__ Safety   GOAL: Patient will have ID or Allergy band on in the Urgent Care       (Environmental)   [place] to environment   Ensure ID band is correct and in place/ allergy band as needed   Assess for fall risk   Initiate fall precautions as applicable (fall band, side rails, etc.)   Call light within reach   Bed in low position/ wheels locked    __met__ Pain   GOAL:  Patient pain will be under control while here in the Urgent Care      Assess pain level and characteristics   Administer analgesics as ordered   Assess effectiveness of pain management and report to MD as needed    _met__ Knowledge Deficit:   GOAL: Patient will be educated on the medication they are receiving here in the Urgent Care   Assess baseline knowledge   Provide teaching at level of understanding   Provide teaching via preferred learning method   Evaluate teaching effectiveness    __met__ Hemodynamic/Respiratory Status:   GOAL: Patient vital signs will be assessed and monitored in the Urgent Care       (Pre and Post Procedure Monitoring)   Assess/Monitor vital signs and LOC   Assess Baseline SpO2 prior to any sedation   Obtain weight/height   Assess vital signs/ LOC until patient meets discharge criteria   Monitor procedure site and notify MD of any issues    CARE PLAN END DATE: 05/09/2022

## 2022-05-09 NOTE — PROGRESS NOTES
Pt given discharge instructions and verbalized understanding of info given. Pt declined to wait shot time. Pt left stable and ambulated toward the exit.

## 2022-05-10 DIAGNOSIS — G47.33 OBSTRUCTIVE SLEEP APNEA ON CPAP: Primary | ICD-10-CM

## 2022-05-10 DIAGNOSIS — Z99.89 OBSTRUCTIVE SLEEP APNEA ON CPAP: Primary | ICD-10-CM

## 2022-05-10 NOTE — TELEPHONE ENCOUNTER
[facility]ib[facility] denied the [facility], they recommend Venofer. Took Venofer order to [facility] for signature. Rafael Og, spoke to Harjit Sanders. She informed me that no prior auth is required for Venofer medication. Ref# [id number]    What dose did you want for the Venofer?

## 2022-05-10 NOTE — PROGRESS NOTES
He was prescribed O2 at 2 liters with exertion and 2 liters bled into PAP by CHF clinic. No overnight pulse ox done to see if needs O2.  If o2 drops at night will likely need re-titration in sleep lab

## 2022-05-11 NOTE — TELEPHONE ENCOUNTER
Venofer 200 mg IV  Osvaldo Khan check with me in office on Thursday, I believe we filled the form out yesterday?

## 2022-05-12 DIAGNOSIS — N18.30 ANEMIA OF CHRONIC RENAL FAILURE, STAGE 3 (MODERATE), UNSPECIFIED WHETHER STAGE 3A OR 3B CKD (HCC): ICD-10-CM

## 2022-05-12 DIAGNOSIS — D63.1 ANEMIA OF CHRONIC RENAL FAILURE, STAGE 3 (MODERATE), UNSPECIFIED WHETHER STAGE 3A OR 3B CKD (HCC): ICD-10-CM

## 2022-05-12 PROBLEM — N18.9 ANEMIA OF CHRONIC RENAL FAILURE: Status: ACTIVE | Noted: 2022-05-12

## 2022-05-12 RX ORDER — EPINEPHRINE 1 MG/ML
0.3 INJECTION, SOLUTION, CONCENTRATE INTRAVENOUS PRN
Status: CANCELLED | OUTPATIENT
Start: 2022-05-12

## 2022-05-12 RX ORDER — SODIUM CHLORIDE 9 MG/ML
INJECTION, SOLUTION INTRAVENOUS CONTINUOUS
Status: CANCELLED | OUTPATIENT
Start: 2022-05-12

## 2022-05-12 RX ORDER — ACETAMINOPHEN 325 MG/1
650 TABLET ORAL
Status: CANCELLED | OUTPATIENT
Start: 2022-05-12

## 2022-05-12 RX ORDER — MEPERIDINE HYDROCHLORIDE 25 MG/ML
12.5 INJECTION INTRAMUSCULAR; INTRAVENOUS; SUBCUTANEOUS PRN
Status: CANCELLED | OUTPATIENT
Start: 2022-05-12

## 2022-05-12 RX ORDER — DIPHENHYDRAMINE HYDROCHLORIDE 50 MG/ML
50 INJECTION INTRAMUSCULAR; INTRAVENOUS
Status: CANCELLED | OUTPATIENT
Start: 2022-05-12

## 2022-05-12 RX ORDER — ALBUTEROL SULFATE 90 UG/1
4 AEROSOL, METERED RESPIRATORY (INHALATION) PRN
Status: CANCELLED | OUTPATIENT
Start: 2022-05-12

## 2022-05-12 RX ORDER — ONDANSETRON 2 MG/ML
8 INJECTION INTRAMUSCULAR; INTRAVENOUS
Status: CANCELLED | OUTPATIENT
Start: 2022-05-12

## 2022-05-12 RX ORDER — SODIUM CHLORIDE 9 MG/ML
5-250 INJECTION, SOLUTION INTRAVENOUS PRN
Status: CANCELLED | OUTPATIENT
Start: 2022-05-12

## 2022-05-12 RX ORDER — HEPARIN SODIUM (PORCINE) LOCK FLUSH IV SOLN 100 UNIT/ML 100 UNIT/ML
500 SOLUTION INTRAVENOUS PRN
Status: CANCELLED | OUTPATIENT
Start: 2022-05-12

## 2022-05-12 RX ORDER — SODIUM CHLORIDE 0.9 % (FLUSH) 0.9 %
5-40 SYRINGE (ML) INJECTION PRN
Status: CANCELLED | OUTPATIENT
Start: 2022-05-12

## 2022-05-12 NOTE — TELEPHONE ENCOUNTER
[employer], spoke to [place]. The Venofer is scheduled for 5/16/22. Pt is aware. Will fax order to [facility]. Will get H & H done on 5/16/22.

## 2022-05-16 ENCOUNTER — HOSPITAL ENCOUNTER (OUTPATIENT)
Dept: GENERAL RADIOLOGY | Age: 62
Discharge: HOME | End: 2022-05-16
Payer: MEDICARE

## 2022-05-16 VITALS
SYSTOLIC BLOOD PRESSURE: 136 MMHG | HEART RATE: 81 BPM | RESPIRATION RATE: 20 BRPM | OXYGEN SATURATION: 98 % | TEMPERATURE: 98 F | DIASTOLIC BLOOD PRESSURE: 80 MMHG

## 2022-05-16 DIAGNOSIS — I12.9 HYPERTENSIVE RENAL DISEASE, STAGE 1 THROUGH STAGE 4 OR UNSPECIFIED CHRONIC KIDNEY DISEASE: ICD-10-CM

## 2022-05-16 DIAGNOSIS — N18.9 ANEMIA OF CHRONIC RENAL FAILURE, UNSPECIFIED CKD STAGE: ICD-10-CM

## 2022-05-16 DIAGNOSIS — N17.9 AKI (ACUTE KIDNEY INJURY) (HCC): Primary | ICD-10-CM

## 2022-05-16 DIAGNOSIS — N18.31 STAGE 3A CHRONIC KIDNEY DISEASE (HCC): ICD-10-CM

## 2022-05-16 DIAGNOSIS — D63.1 ANEMIA OF CHRONIC RENAL FAILURE, UNSPECIFIED CKD STAGE: ICD-10-CM

## 2022-05-16 DIAGNOSIS — N18.32 STAGE 3B CHRONIC KIDNEY DISEASE (HCC): ICD-10-CM

## 2022-05-16 LAB
HCT VFR BLD CALC: 27.9 % (ref 42–52)
HEMOGLOBIN: 8.9 GM/DL (ref 14–18)
MCH RBC QN AUTO: 25.6 PG (ref 27–31)
MCHC RBC AUTO-ENTMCNC: 31.9 GM/DL (ref 33–37)
MCV RBC AUTO: 80 FL (ref 80–94)
PDW BLD-RTO: 18.1 % (ref 11.5–14.5)
PLATELET # BLD: 259 THOU/MM3 (ref 130–400)
PMV BLD AUTO: 7.8 FL (ref 7.4–10.4)
RBC # BLD: 3.48 MILL/MM3 (ref 4.7–6.1)
WBC # BLD: 6.1 THOU/MM3 (ref 4.8–10.8)

## 2022-05-16 PROCEDURE — 6360000002 HC RX W HCPCS: Performed by: INTERNAL MEDICINE

## 2022-05-16 PROCEDURE — 80048 BASIC METABOLIC PNL TOTAL CA: CPT

## 2022-05-16 PROCEDURE — 36415 COLL VENOUS BLD VENIPUNCTURE: CPT

## 2022-05-16 PROCEDURE — 96372 THER/PROPH/DIAG INJ SC/IM: CPT

## 2022-05-16 PROCEDURE — 85027 COMPLETE CBC AUTOMATED: CPT

## 2022-05-16 PROCEDURE — 96365 THER/PROPH/DIAG IV INF INIT: CPT

## 2022-05-16 PROCEDURE — 2580000003 HC RX 258: Performed by: INTERNAL MEDICINE

## 2022-05-16 RX ORDER — DIPHENHYDRAMINE HYDROCHLORIDE 50 MG/ML
50 INJECTION INTRAMUSCULAR; INTRAVENOUS
OUTPATIENT
Start: 2022-05-23

## 2022-05-16 RX ORDER — MEPERIDINE HYDROCHLORIDE 25 MG/ML
12.5 INJECTION INTRAMUSCULAR; INTRAVENOUS; SUBCUTANEOUS PRN
OUTPATIENT
Start: 2022-05-23

## 2022-05-16 RX ORDER — HEPARIN SODIUM (PORCINE) LOCK FLUSH IV SOLN 100 UNIT/ML 100 UNIT/ML
500 SOLUTION INTRAVENOUS PRN
OUTPATIENT
Start: 2022-05-23

## 2022-05-16 RX ORDER — SODIUM CHLORIDE 9 MG/ML
INJECTION, SOLUTION INTRAVENOUS CONTINUOUS
Status: CANCELLED | OUTPATIENT
Start: 2022-05-23

## 2022-05-16 RX ORDER — ACETAMINOPHEN 325 MG/1
650 TABLET ORAL
Status: CANCELLED | OUTPATIENT
Start: 2022-05-23

## 2022-05-16 RX ORDER — ONDANSETRON 2 MG/ML
8 INJECTION INTRAMUSCULAR; INTRAVENOUS
Status: CANCELLED | OUTPATIENT
Start: 2022-05-23

## 2022-05-16 RX ORDER — ACETAMINOPHEN 325 MG/1
650 TABLET ORAL
OUTPATIENT
Start: 2022-05-23

## 2022-05-16 RX ORDER — SODIUM CHLORIDE 9 MG/ML
INJECTION, SOLUTION INTRAVENOUS CONTINUOUS
Status: DISCONTINUED | OUTPATIENT
Start: 2022-05-16 | End: 2022-05-17 | Stop reason: HOSPADM

## 2022-05-16 RX ORDER — ONDANSETRON 2 MG/ML
8 INJECTION INTRAMUSCULAR; INTRAVENOUS
OUTPATIENT
Start: 2022-05-23

## 2022-05-16 RX ORDER — SODIUM CHLORIDE 9 MG/ML
INJECTION, SOLUTION INTRAVENOUS CONTINUOUS
OUTPATIENT
Start: 2022-05-23

## 2022-05-16 RX ORDER — SODIUM CHLORIDE 0.9 % (FLUSH) 0.9 %
5-40 SYRINGE (ML) INJECTION PRN
OUTPATIENT
Start: 2022-05-23

## 2022-05-16 RX ORDER — ALBUTEROL SULFATE 90 UG/1
4 AEROSOL, METERED RESPIRATORY (INHALATION) PRN
OUTPATIENT
Start: 2022-05-23

## 2022-05-16 RX ORDER — SODIUM CHLORIDE 9 MG/ML
5-250 INJECTION, SOLUTION INTRAVENOUS PRN
OUTPATIENT
Start: 2022-05-23

## 2022-05-16 RX ORDER — ALBUTEROL SULFATE 90 UG/1
4 AEROSOL, METERED RESPIRATORY (INHALATION) PRN
Status: CANCELLED | OUTPATIENT
Start: 2022-05-23

## 2022-05-16 RX ORDER — DIPHENHYDRAMINE HYDROCHLORIDE 50 MG/ML
50 INJECTION INTRAMUSCULAR; INTRAVENOUS
Status: CANCELLED | OUTPATIENT
Start: 2022-05-23

## 2022-05-16 RX ADMIN — EPOETIN ALFA-EPBX 10000 UNITS: 10000 INJECTION, SOLUTION INTRAVENOUS; SUBCUTANEOUS at 14:21

## 2022-05-16 RX ADMIN — IRON SUCROSE 200 MG: 20 INJECTION, SOLUTION INTRAVENOUS at 14:31

## 2022-05-16 NOTE — PROGRESS NOTES
__met__ Safety   GOAL: Patient will have ID or Allergy band on in the Urgent Care       (Environmental)   [place] to environment   Ensure ID band is correct and in place/ allergy band as needed   Assess for fall risk   Initiate fall precautions as applicable (fall band, side rails, etc.)   Call light within reach   Bed in low position/ wheels locked    __met__ Pain   GOAL:  Patient pain will be under control while here in the Urgent Care      Assess pain level and characteristics   Administer analgesics as ordered   Assess effectiveness of pain management and report to MD as needed    _met__ Knowledge Deficit:   GOAL: Patient will be educated on the medication they are receiving here in the Urgent Care   Assess baseline knowledge   Provide teaching at level of understanding   Provide teaching via preferred learning method   Evaluate teaching effectiveness    __met__ Hemodynamic/Respiratory Status:   GOAL: Patient vital signs will be assessed and monitored in the Urgent Care       (Pre and Post Procedure Monitoring)   Assess/Monitor vital signs and LOC   Assess Baseline SpO2 prior to any sedation   Obtain weight/height   Assess vital signs/ LOC until patient meets discharge criteria   Monitor procedure site and notify MD of any issues    __met__ Infection-Risk of Peripheral Venous Catheter:   GOAL: Patient will be monitored for infection while in the Urgent Care   Monitor for infection signs and symptoms (catheter site redness, temperature elevation, etc)   Assess for infection risks   Educate regarding infection prevention   Manage central venous catheter (flushes/ dressing changes per protocol)    CARE PLAN END DATE: 05/16/2022

## 2022-05-17 ENCOUNTER — TELEPHONE (OUTPATIENT)
Dept: NEPHROLOGY | Age: 62
End: 2022-05-17

## 2022-05-17 ENCOUNTER — OFFICE VISIT (OUTPATIENT)
Dept: PULMONOLOGY | Age: 62
End: 2022-05-17
Payer: MEDICARE

## 2022-05-17 VITALS
OXYGEN SATURATION: 98 % | DIASTOLIC BLOOD PRESSURE: 82 MMHG | HEIGHT: 73 IN | TEMPERATURE: 97.9 F | BODY MASS INDEX: 41.75 KG/M2 | SYSTOLIC BLOOD PRESSURE: 136 MMHG | HEART RATE: 97 BPM | WEIGHT: 315 LBS

## 2022-05-17 DIAGNOSIS — J45.20 MILD INTERMITTENT ASTHMA WITHOUT COMPLICATION: Primary | ICD-10-CM

## 2022-05-17 DIAGNOSIS — E66.01 MORBID OBESITY WITH BMI OF 40.0-44.9, ADULT (HCC): ICD-10-CM

## 2022-05-17 DIAGNOSIS — R06.2 WHEEZING: ICD-10-CM

## 2022-05-17 LAB
ANION GAP SERPL CALCULATED.3IONS-SCNC: 14 MEQ/L (ref 8–16)
BUN BLDV-MCNC: 28 MG/DL (ref 7–22)
CALCIUM SERPL-MCNC: 9 MG/DL (ref 8.5–10.5)
CHLORIDE BLD-SCNC: 100 MEQ/L (ref 98–111)
CO2: 21 MEQ/L (ref 23–33)
CREAT SERPL-MCNC: 3.2 MG/DL (ref 0.4–1.2)
GFR SERPL CREATININE-BSD FRML MDRD: 20 ML/MIN/1.73M2
GLUCOSE BLD-MCNC: 70 MG/DL (ref 70–108)
POTASSIUM SERPL-SCNC: 5 MEQ/L (ref 3.5–5.2)
SODIUM BLD-SCNC: 135 MEQ/L (ref 135–145)

## 2022-05-17 PROCEDURE — G8417 CALC BMI ABV UP PARAM F/U: HCPCS | Performed by: NURSE PRACTITIONER

## 2022-05-17 PROCEDURE — 99214 OFFICE O/P EST MOD 30 MIN: CPT | Performed by: NURSE PRACTITIONER

## 2022-05-17 PROCEDURE — 3017F COLORECTAL CA SCREEN DOC REV: CPT | Performed by: NURSE PRACTITIONER

## 2022-05-17 PROCEDURE — 95012 NITRIC OXIDE EXP GAS DETER: CPT | Performed by: NURSE PRACTITIONER

## 2022-05-17 PROCEDURE — G8427 DOCREV CUR MEDS BY ELIG CLIN: HCPCS | Performed by: NURSE PRACTITIONER

## 2022-05-17 PROCEDURE — 94010 BREATHING CAPACITY TEST: CPT | Performed by: NURSE PRACTITIONER

## 2022-05-17 PROCEDURE — 1111F DSCHRG MED/CURRENT MED MERGE: CPT | Performed by: NURSE PRACTITIONER

## 2022-05-17 PROCEDURE — 4004F PT TOBACCO SCREEN RCVD TLK: CPT | Performed by: NURSE PRACTITIONER

## 2022-05-17 ASSESSMENT — ENCOUNTER SYMPTOMS
STRIDOR: 0
NAUSEA: 0
VOMITING: 0
SHORTNESS OF BREATH: 1
ALLERGIC/IMMUNOLOGIC NEGATIVE: 1
WHEEZING: 0
DIARRHEA: 0
GASTROINTESTINAL NEGATIVE: 1
CHEST TIGHTNESS: 0
EYES NEGATIVE: 1

## 2022-05-17 NOTE — PROGRESS NOTES
[facility]    Patient: Florinda Chauhan, 63 y.o.   : 1960  2022      Subjective     Chief Complaint   Patient presents with    Follow-up     3 month Asthma follow up, med check        HPI  Nesha Del Toro is here for follow up for Asthma. Patient states his breathing is real good as long as he is on the oxygen. Put on home oxygen 5/3/22 per [facility] for his CHF  On Breo 200-25 mcg 1 puff daily- repeat NIOX today was 33 but patient feels good - denies any wheezing   Sent ER from our office on 22 was admitted for 4 days- for CHF exacerbation   Per nephrology patient now getting Procrit and Venofer infusion   LAURIE on BiPAP  No pulmonary issues today     Asthma control (Severity) questionnaire:  Asthma symptoms:  > 2 times per week -> No   Night time awakenings: > 2 times per month -> Yes  Use of short acting beta agonist for symptoms control (Other than pre exercise use) :> 2times per week  -> Yes  Interference with normal activity  -> none  Lung function: Fev1 >60% Predicted  -> Yes  Number of exacerbations per year: > 2 -> No    Progress History:   Since last visit any new medical issues? No  New ER or hospital visits? Yes as above   Any new or changes in medicines? Yes   Using inhalers? Yes Breo and Albuterol PRN  Are they helpful? Yes   Flu vaccine- UTD  Pneumonia vaccine UTD  Covid  Vaccine: done x 2 plus booster  Past Medical hx   PMH:  Past Medical History:   Diagnosis Date    Arthritis     Back problem     back pain-sees [facility] pain mgmt    Bladder disease     Dr. Tracee Jones CHF with unknown LVEF ([facility] [address] 2021    Dr. George/CHF clinic    Constipation     Diabetes mellitus ([facility] [address]     Dr. Meenakshi Long Hypertension     Hypertensive emergency 2019    Hypertensive urgency 2019    Sleep apnea     has bipap-sees AZRA Villa CNP    Thyroid disease      SURGICAL HISTORY:  Past Surgical History:   Procedure Laterality Date    ABDOMEN SURGERY      ABDOMEN SURGERY N/A 3/25/2022    ABDOMINAL LYSIS OF ADHESIONS, EXPLANT OF INFECTED MESH performed by Benito Colmenares MD at [place] [address]      no stents    CARPAL TUNNEL RELEASE Bilateral     COLONOSCOPY  2017    Dr. Chayo Valverde, [address]      x3 surgeries with 14 repairs    KNEE SURGERY Right 1980's    cartilage    ME EXPLORATORY OF ABDOMEN N/A 2/5/2018    ABDOMINAL EXPLORATION WITH LYSIS OF COMPLICATED ADHESIONS WITH AN EXTENDED RIGHT COLON RESECTION performed by Benito Colmenares MD at [address] [place] [address] Road:  Social History     Tobacco Use    Smoking status: Former Smoker     Types: Pipe    Smokeless tobacco: Current User     Types: Chew    Tobacco comment: quit pipe over 30 yrs ago   Vaping Use    Vaping Use: Never used   Substance Use Topics    Alcohol use: No     Alcohol/week: 0.0 standard drinks     Comment: quit    Drug use: No     ALLERGIES:  Allergies   Allergen Reactions    Shellfish-Derived Products Swelling     Tolerates IV dye without any problems      Pcn [Penicillins] Itching    Succinylcholine      Pseudocholinesterase Deficiency    Sulfa Antibiotics Itching    Morphine Nausea And Vomiting     \"head swimming, skin crawling\"    Tape [Adhesive Tape] Rash     FAMILY HISTORY:  Family History   Problem Relation Age of Onset    Cancer Mother         breast    Heart Disease Father         bladder, lung    Cancer Father     Stroke Brother     Heart Attack Brother     Prostate Cancer Brother     Diabetes Paternal Grandmother     Arthritis Brother     High Blood Pressure Neg Hx      CURRENT MEDICATIONS:  Current Outpatient Medications   Medication Sig Dispense Refill    spironolactone (ALDACTONE) 50 MG tablet Take 0.5 tablets by mouth daily 30 tablet 3    RA MELATONIN 3-2 MG TABS TAKE 1 AND 1/2 TABLETS BY MOUTH NIGHTLY AS NEEDED FOR SLEEP      oxybutynin (DITROPAN-XL) 5 MG extended release tablet Take 1 tablet by mouth at bedtime 30 tablet 0    amitriptyline (ELAVIL) 10 MG tablet Take 10 mg by mouth 2 times daily      bumetanide (BUMEX) 1 MG tablet Take 2 tablets by mouth daily AND 1 tablet as needed.  60 tablet 6    potassium chloride (K-TAB) 10 MEQ extended release tablet Take 1 tablet by mouth daily 60 tablet 3    tamsulosin (FLOMAX) 0.4 MG capsule Take 1 capsule by mouth nightly 90 capsule 3    carvedilol (COREG) 25 MG tablet Take 2 tablets by mouth 2 times daily 60 tablet 3    hydrALAZINE (APRESOLINE) 25 MG tablet Take 1 tablet by mouth every 8 hours 90 tablet 3    levothyroxine (SYNTHROID) 175 MCG tablet Take 1 tablet by mouth Daily 30 tablet 3    amLODIPine (NORVASC) 10 MG tablet Take 1 tablet by mouth daily 30 tablet 3    melatonin 3 MG TABS tablet Take 1.5 tablets by mouth nightly as needed (Sleep) 45 tablet 3    ipratropium-albuterol (DUONEB) 0.5-2.5 (3) MG/3ML SOLN nebulizer solution Inhale 3 mLs into the lungs every 4 hours as needed for Shortness of Breath (Patient taking differently: Inhale 1 vial into the lungs every 4 hours as needed for Shortness of Breath Usually uses at night) 360 mL 1    Fluticasone furoate-vilanterol (BREO ELLIPTA) 200-25 MCG/INH AEPB inhaler Inhale 1 puff into the lungs daily 3 each 3    montelukast (SINGULAIR) 10 MG tablet Take 1 tablet by mouth nightly 90 tablet 3    albuterol sulfate  (90 Base) MCG/ACT inhaler Inhale 2 puffs into the lungs every 6 hours as needed for Wheezing or Shortness of Breath 1 each 5    CPAP Machine MISC by Does not apply route Please change bipap 17/13 cm H20. 1 each 0    rOPINIRole (REQUIP) 1 MG tablet Take 1 pill in afternoon and 2 at bedtime 270 tablet 1    doxepin (SINEQUAN) 25 MG capsule Take 2 capsules by mouth nightly 60 capsule 3    aspirin EC 81 MG EC tablet Take 1 tablet by mouth daily 90 tablet 1    nitroGLYCERIN (NITROSTAT) 0.4 MG SL tablet Place 1 tablet under the tongue every 5 minutes as needed for Chest pain (up to 3 doses) 25 tablet 3    glimepiride (AMARYL) 4 MG tablet Take 4 mg by mouth daily       azelastine (ASTELIN) 137 MCG/SPRAY nasal spray 1 spray by Nasal route as needed. Use in each nostril as directed      gabapentin (NEURONTIN) 300 MG capsule Take 1 capsule by mouth nightly for 30 days. 30 capsule 0     No current facility-administered medications for this visit. ROS   Review of Systems   Constitutional: Negative. Negative for chills, fever and unexpected weight change. HENT: Negative. Eyes: Negative. Respiratory: Positive for shortness of breath. Negative for chest tightness, wheezing and stridor. Cardiovascular: Positive for leg swelling. Negative for chest pain. Gastrointestinal: Negative. Negative for diarrhea, nausea and vomiting. Endocrine: Negative. Genitourinary: Negative. Negative for dysuria. Musculoskeletal: Positive for gait problem. Skin: Negative. Allergic/Immunologic: Negative. Hematological: Negative. Psychiatric/Behavioral: Negative. Physical exam   /82   Pulse 97   Temp 97.9 °F (36.6 °C)   Ht 6' 1\" (1.854 m)   Wt (!) 329 lb (149.2 kg)   SpO2 98% Comment: 2 liters continuous  BMI 43.41 kg/m²    Wt Readings from Last 3 Encounters:   05/17/22 (!) 329 lb (149.2 kg)   05/09/22 (!) 314 lb (142.4 kg)   05/05/22 (!) 324 lb (147 kg)       Physical Exam  Vitals and nursing note reviewed. Constitutional:       General: He is not in acute distress. Appearance: He is morbidly obese. HENT:      Head: Normocephalic and atraumatic. Neck:      Trachea: No tracheal deviation. Cardiovascular:      Rate and Rhythm: Normal rate and regular rhythm. Heart sounds: Normal heart sounds. No murmur heard. Pulmonary:      Effort: Pulmonary effort is normal. No respiratory distress. Breath sounds: Normal breath sounds. No stridor. No wheezing or rales. Chest:      Chest wall: No tenderness.    Abdominal:      General: Bowel sounds are normal. There is no distension. Palpations: Abdomen is soft. Skin:     General: Skin is warm and dry. Capillary Refill: Capillary refill takes less than 2 seconds. Neurological:      Mental Status: He is alert and oriented to person, place, and time. Gait: Gait abnormal.   Psychiatric:         Behavior: Behavior normal.         Thought Content: Thought content normal.         Judgment: Judgment normal.          results   Lung Nodule Screening     [] Qualifies    [x] Does not qualify   [] Declined    [] Completed  The USPSTF recommends annual screening for lung cancer with low-dose computed tomography (LDCT) in adults aged 48 to [de-identified] years who have a 30 pack-year smoking history and currently smoke or have quit within the past 20 years. Screening should be discontinued once a person has not smoked for 20 years or develops a health problem that substantially limits life expectancy or the ability or willingness to have curative lung surgery. Assessment      Diagnosis Orders   1. Mild intermittent asthma without complication  POCT Nitric Oxide   2. Wheezing  POCT Nitric Oxide   3.  Morbid obesity with BMI of 40.0-44.9, adult ([address]           Plan   -NIOX done today in the office- still elevated but patient doing well with no pulmonary complaints today   -Continue Breo 200-5 mcg 1 puff daily   -Duonebs PRN  -Encouraged continued follow up in the CHF Clini  -Weight loss encouraged   -Advised to maintain pneumonia vaccine with PCP and to take flu vaccine this coming season.  -Advised patient to call office with any changes, questions, or concerns regarding respiratory status    Will see Bryce Hollins back in: 6 months    Anatoly Rodriguez [facility]  5/17/2022

## 2022-05-18 ENCOUNTER — TELEPHONE (OUTPATIENT)
Dept: NEPHROLOGY | Age: 62
End: 2022-05-18

## 2022-05-18 DIAGNOSIS — N18.4 CKD (CHRONIC KIDNEY DISEASE) STAGE 4, GFR 15-29 ML/MIN (HCC): Primary | ICD-10-CM

## 2022-05-18 NOTE — TELEPHONE ENCOUNTER
Left message informing pt that is creat is a little higher and repeat labs in 7 - 10 days. Asked for call back to see if pt has any swelling. Lab orders pending.

## 2022-05-18 NOTE — TELEPHONE ENCOUNTER
Spoke to pt, he stated that he has no swelling and it looks better than it did. Will get labs done when he gets his infusion on Monday.

## 2022-05-18 NOTE — TELEPHONE ENCOUNTER
----- Message from Lala Hayden MD sent at 5/17/2022  8:28 PM EDT -----  Check urine sodium and BMP in 7-10 days  Creat is little higher this time  Any leg swelling?

## 2022-05-20 ENCOUNTER — HOSPITAL ENCOUNTER (OUTPATIENT)
Dept: RESPIRATORY THERAPY | Age: 62
Discharge: HOME | End: 2022-05-20
Payer: MEDICARE

## 2022-05-20 PROCEDURE — 94762 N-INVAS EAR/PLS OXIMTRY CONT: CPT

## 2022-05-23 ENCOUNTER — HOSPITAL ENCOUNTER (OUTPATIENT)
Dept: GENERAL RADIOLOGY | Age: 62
Discharge: HOME | End: 2022-05-23
Payer: MEDICARE

## 2022-05-23 VITALS
TEMPERATURE: 96.9 F | OXYGEN SATURATION: 99 % | SYSTOLIC BLOOD PRESSURE: 158 MMHG | HEART RATE: 73 BPM | RESPIRATION RATE: 18 BRPM | DIASTOLIC BLOOD PRESSURE: 89 MMHG

## 2022-05-23 DIAGNOSIS — N18.9 ANEMIA OF CHRONIC RENAL FAILURE, UNSPECIFIED CKD STAGE: ICD-10-CM

## 2022-05-23 DIAGNOSIS — I50.32 CHF (CONGESTIVE HEART FAILURE), NYHA CLASS II, CHRONIC, DIASTOLIC (HCC): Primary | ICD-10-CM

## 2022-05-23 DIAGNOSIS — N17.9 AKI (ACUTE KIDNEY INJURY) (HCC): ICD-10-CM

## 2022-05-23 DIAGNOSIS — D63.1 ANEMIA OF CHRONIC RENAL FAILURE, UNSPECIFIED CKD STAGE: ICD-10-CM

## 2022-05-23 DIAGNOSIS — N18.4 CKD (CHRONIC KIDNEY DISEASE) STAGE 4, GFR 15-29 ML/MIN (HCC): ICD-10-CM

## 2022-05-23 DIAGNOSIS — N18.31 STAGE 3A CHRONIC KIDNEY DISEASE (HCC): ICD-10-CM

## 2022-05-23 LAB
BASOPHILS # BLD: 0.6 %
BASOPHILS ABSOLUTE: 0 THOU/MM3 (ref 0–0.1)
BUN, WHOLE BLOOD: 29 MG/DL (ref 8–26)
CHLORIDE, WHOLE BLOOD: 106 MEQ/L (ref 98–109)
CREAT SERPL-MCNC: 3 MG/DL (ref 0.5–1.2)
EKG ATRIAL RATE: 78 BPM
EKG P AXIS: 13 DEGREES
EKG P-R INTERVAL: 164 MS
EKG Q-T INTERVAL: 394 MS
EKG QRS DURATION: 102 MS
EKG QTC CALCULATION (BAZETT): 449 MS
EKG T AXIS: 1 DEGREES
EKG VENTRICULAR RATE: 78 BPM
EOSINOPHIL # BLD: 5.7 %
EOSINOPHILS ABSOLUTE: 0.3 THOU/MM3 (ref 0–0.4)
GFR, ESTIMATED: 23 ML/MIN/1.73M2
GLUCOSE, WHOLE BLOOD: 102 MG/DL (ref 70–108)
HCT VFR BLD CALC: 27 % (ref 42–52)
HEMOGLOBIN: 8.3 GM/DL (ref 14–18)
IMMATURE GRANS (ABS): 0.02 THOU/MM3 (ref 0–0.07)
IMMATURE GRANULOCYTES: 0.4 %
LYMPHOCYTES # BLD: 11.2 %
LYMPHOCYTES ABSOLUTE: 0.6 THOU/MM3 (ref 1–4.8)
MCH RBC QN AUTO: 24.7 PG (ref 27–31)
MCHC RBC AUTO-ENTMCNC: 30.7 GM/DL (ref 33–37)
MCV RBC AUTO: 80 FL (ref 80–94)
MONOCYTES # BLD: 8.4 %
MONOCYTES ABSOLUTE: 0.5 THOU/MM3 (ref 0.4–1.3)
NUCLEATED RED BLOOD CELLS: 0 /100 WBC
PDW BLD-RTO: 18.3 % (ref 11.5–14.5)
PLATELET # BLD: 289 THOU/MM3 (ref 130–400)
PMV BLD AUTO: 7.4 FL (ref 7.4–10.4)
POTASSIUM, WHOLE BLOOD: 5 MEQ/L (ref 3.5–4.9)
RBC # BLD: 3.36 MILL/MM3 (ref 4.7–6.1)
SEG NEUTROPHILS: 73.7 %
SEGMENTED NEUTROPHILS ABSOLUTE COUNT: 4.1 THOU/MM3 (ref 1.8–7.7)
SODIUM BLD-SCNC: 139 MEQ/L (ref 138–146)
TOTAL CO2, WHOLE BLOOD: 24 MEQ/L (ref 23–33)
WBC # BLD: 5.6 THOU/MM3 (ref 4.8–10.8)

## 2022-05-23 PROCEDURE — 93005 ELECTROCARDIOGRAM TRACING: CPT | Performed by: NURSE PRACTITIONER

## 2022-05-23 PROCEDURE — 82565 ASSAY OF CREATININE: CPT

## 2022-05-23 PROCEDURE — 83880 ASSAY OF NATRIURETIC PEPTIDE: CPT

## 2022-05-23 PROCEDURE — 96372 THER/PROPH/DIAG INJ SC/IM: CPT

## 2022-05-23 PROCEDURE — 6360000002 HC RX W HCPCS: Performed by: NURSE PRACTITIONER

## 2022-05-23 PROCEDURE — 85025 COMPLETE CBC W/AUTO DIFF WBC: CPT

## 2022-05-23 PROCEDURE — 96375 TX/PRO/DX INJ NEW DRUG ADDON: CPT

## 2022-05-23 PROCEDURE — 6360000002 HC RX W HCPCS: Performed by: INTERNAL MEDICINE

## 2022-05-23 PROCEDURE — 96365 THER/PROPH/DIAG IV INF INIT: CPT

## 2022-05-23 PROCEDURE — 2580000003 HC RX 258: Performed by: INTERNAL MEDICINE

## 2022-05-23 PROCEDURE — 84300 ASSAY OF URINE SODIUM: CPT

## 2022-05-23 PROCEDURE — 84520 ASSAY OF UREA NITROGEN: CPT

## 2022-05-23 PROCEDURE — 96374 THER/PROPH/DIAG INJ IV PUSH: CPT

## 2022-05-23 PROCEDURE — 93264 REM MNTR WRLS P-ART PRS SNR: CPT | Performed by: NURSE PRACTITIONER

## 2022-05-23 PROCEDURE — 36415 COLL VENOUS BLD VENIPUNCTURE: CPT

## 2022-05-23 PROCEDURE — 82947 ASSAY GLUCOSE BLOOD QUANT: CPT

## 2022-05-23 PROCEDURE — 93010 ELECTROCARDIOGRAM REPORT: CPT | Performed by: INTERNAL MEDICINE

## 2022-05-23 PROCEDURE — 80051 ELECTROLYTE PANEL: CPT

## 2022-05-23 PROCEDURE — 96367 TX/PROPH/DG ADDL SEQ IV INF: CPT

## 2022-05-23 RX ORDER — SODIUM CHLORIDE 9 MG/ML
5-250 INJECTION, SOLUTION INTRAVENOUS PRN
OUTPATIENT
Start: 2022-05-30

## 2022-05-23 RX ORDER — ACETAMINOPHEN 325 MG/1
650 TABLET ORAL
Status: CANCELLED | OUTPATIENT
Start: 2022-05-30

## 2022-05-23 RX ORDER — ALBUTEROL SULFATE 90 UG/1
4 AEROSOL, METERED RESPIRATORY (INHALATION) PRN
Status: CANCELLED | OUTPATIENT
Start: 2022-05-30

## 2022-05-23 RX ORDER — ALBUTEROL SULFATE 90 UG/1
4 AEROSOL, METERED RESPIRATORY (INHALATION) PRN
OUTPATIENT
Start: 2022-05-30

## 2022-05-23 RX ORDER — HEPARIN SODIUM (PORCINE) LOCK FLUSH IV SOLN 100 UNIT/ML 100 UNIT/ML
500 SOLUTION INTRAVENOUS PRN
OUTPATIENT
Start: 2022-05-30

## 2022-05-23 RX ORDER — MEPERIDINE HYDROCHLORIDE 25 MG/ML
12.5 INJECTION INTRAMUSCULAR; INTRAVENOUS; SUBCUTANEOUS PRN
OUTPATIENT
Start: 2022-05-30

## 2022-05-23 RX ORDER — SODIUM CHLORIDE 9 MG/ML
INJECTION, SOLUTION INTRAVENOUS CONTINUOUS
OUTPATIENT
Start: 2022-05-30

## 2022-05-23 RX ORDER — FUROSEMIDE 10 MG/ML
40 INJECTION INTRAMUSCULAR; INTRAVENOUS ONCE
Status: COMPLETED | OUTPATIENT
Start: 2022-05-23 | End: 2022-05-23

## 2022-05-23 RX ORDER — SODIUM CHLORIDE 9 MG/ML
INJECTION, SOLUTION INTRAVENOUS CONTINUOUS
Status: CANCELLED | OUTPATIENT
Start: 2022-05-30

## 2022-05-23 RX ORDER — DIPHENHYDRAMINE HYDROCHLORIDE 50 MG/ML
50 INJECTION INTRAMUSCULAR; INTRAVENOUS
OUTPATIENT
Start: 2022-05-30

## 2022-05-23 RX ORDER — ACETAMINOPHEN 325 MG/1
650 TABLET ORAL
OUTPATIENT
Start: 2022-05-30

## 2022-05-23 RX ORDER — ONDANSETRON 2 MG/ML
8 INJECTION INTRAMUSCULAR; INTRAVENOUS
Status: CANCELLED | OUTPATIENT
Start: 2022-05-30

## 2022-05-23 RX ORDER — SODIUM CHLORIDE 0.9 % (FLUSH) 0.9 %
5-40 SYRINGE (ML) INJECTION PRN
OUTPATIENT
Start: 2022-05-30

## 2022-05-23 RX ORDER — DIPHENHYDRAMINE HYDROCHLORIDE 50 MG/ML
50 INJECTION INTRAMUSCULAR; INTRAVENOUS
Status: CANCELLED | OUTPATIENT
Start: 2022-05-30

## 2022-05-23 RX ORDER — FUROSEMIDE 10 MG/ML
40 INJECTION INTRAMUSCULAR; INTRAVENOUS ONCE
Status: DISCONTINUED | OUTPATIENT
Start: 2022-05-23 | End: 2022-05-23

## 2022-05-23 RX ORDER — ONDANSETRON 2 MG/ML
8 INJECTION INTRAMUSCULAR; INTRAVENOUS
OUTPATIENT
Start: 2022-05-30

## 2022-05-23 RX ORDER — SODIUM CHLORIDE 9 MG/ML
INJECTION, SOLUTION INTRAVENOUS CONTINUOUS
Status: DISCONTINUED | OUTPATIENT
Start: 2022-05-23 | End: 2022-05-24 | Stop reason: HOSPADM

## 2022-05-23 RX ADMIN — IRON SUCROSE 200 MG: 20 INJECTION, SOLUTION INTRAVENOUS at 13:41

## 2022-05-23 RX ADMIN — FUROSEMIDE 40 MG: 10 INJECTION, SOLUTION INTRAMUSCULAR; INTRAVENOUS at 15:04

## 2022-05-23 RX ADMIN — EPOETIN ALFA-EPBX 10000 UNITS: 10000 INJECTION, SOLUTION INTRAVENOUS; SUBCUTANEOUS at 14:48

## 2022-05-23 ASSESSMENT — PAIN DESCRIPTION - FREQUENCY: FREQUENCY: CONTINUOUS

## 2022-05-23 ASSESSMENT — PAIN DESCRIPTION - PAIN TYPE: TYPE: CHRONIC PAIN

## 2022-05-23 ASSESSMENT — PAIN DESCRIPTION - LOCATION: LOCATION: GENERALIZED

## 2022-05-23 ASSESSMENT — PAIN DESCRIPTION - DESCRIPTORS: DESCRIPTORS: ACHING

## 2022-05-23 ASSESSMENT — PAIN SCALES - GENERAL: PAINLEVEL_OUTOF10: 8

## 2022-05-23 NOTE — PROGRESS NOTES
Called and spoke w/ Estefany Back = currently in waiting room at [facility] for Fe infusion. Contacted pt to check on him d/t increasing CardioMEMs  PAD has jumped from 29 to 39 in past 6 days. Also of note on CardioMEMs HR has been elevated - 90-110s - baseline appears around 70-80. Bumex was decreased to 1mg/day from BID last week d/t bump in creat to 3.2  He notes its been \"bad weekend\" - increased SOB, had to bump O2 up to 3-4L at times. Denies swelling or wt gain. Labs reviewed - creat 3.0, K+ 5.0    Called and spoke to Dr Cherry Sloan - ok to give IV Bumex 2mg today and go back to 1 mg BID - repeat BMP in 1 week. Called and spoke to nurse - no swelling noted on exam.    EKG done - SR, rate 75 per nurse at [facility]   They do not have IV Bumex only Lasix   Order given for Lasix 40 mg IV today - will f/u w/ pt tomorrow on further orders.

## 2022-05-23 NOTE — PROGRESS NOTES
Pt to room 1. VS obtained. INT started in [facility] without difficulty. Blood drawn from INT and sent to lab. INT flushed via 10 ml NS. Medication infusing without difficulty. Dr. Anne [place] notified of Critical Creatinine of 3.0   H/H > 10 Retacrit administered as per order without difficulty. Pt at this time denies any needs and is stable.

## 2022-05-24 ENCOUNTER — TELEPHONE (OUTPATIENT)
Dept: NEPHROLOGY | Age: 62
End: 2022-05-24

## 2022-05-24 DIAGNOSIS — N18.30 ANEMIA IN STAGE 3 CHRONIC KIDNEY DISEASE, UNSPECIFIED WHETHER STAGE 3A OR 3B CKD (HCC): Primary | ICD-10-CM

## 2022-05-24 DIAGNOSIS — D63.1 ANEMIA IN STAGE 3 CHRONIC KIDNEY DISEASE, UNSPECIFIED WHETHER STAGE 3A OR 3B CKD (HCC): Primary | ICD-10-CM

## 2022-05-24 DIAGNOSIS — I50.32 CHF (CONGESTIVE HEART FAILURE), NYHA CLASS II, CHRONIC, DIASTOLIC (HCC): Primary | ICD-10-CM

## 2022-05-24 LAB
PRO-BNP: 1509 PG/ML (ref 0–900)
SODIUM URINE: 60 MEQ/L

## 2022-05-24 RX ORDER — SPIRONOLACTONE 25 MG/1
12.5 TABLET ORAL DAILY
Qty: 45 TABLET | Refills: 3 | Status: SHIPPED | OUTPATIENT
Start: 2022-05-24 | End: 2022-06-30 | Stop reason: ALTCHOICE

## 2022-05-24 RX ORDER — CARVEDILOL 25 MG/1
50 TABLET ORAL 2 TIMES DAILY
Qty: 120 TABLET | Refills: 5 | Status: ON HOLD | OUTPATIENT
Start: 2022-05-24 | End: 2022-08-17 | Stop reason: SDUPTHER

## 2022-05-24 NOTE — TELEPHONE ENCOUNTER
Left message informing pt that he needs to get Retacrit injections. Asked for a call back to confirm that he received the message, to confirm that he wants at [facility] and what day works best.    Lab order pending. Order on desk for signature.

## 2022-05-24 NOTE — TELEPHONE ENCOUNTER
Patient called back. He just received his 2nd injection yesterday and was under the understanding he will continue to go on Mondays in [place] for another 4 or 5 visits. Please call him back. I was uncertain. I do not see any scheduled visits at this time.

## 2022-05-24 NOTE — TELEPHONE ENCOUNTER
----- Message from Richard Thompson MD sent at 5/23/2022 10:15 PM EDT -----  Retacrit 10,000 units weekly x 4 weeks  Weekly H/H   Hold retacrit if H/H >10

## 2022-05-24 NOTE — PROGRESS NOTES
__met__ Safety   GOAL: Patient will have ID or Allergy band on in the Urgent Care       (Environmental)   [place] to environment   Ensure ID band is correct and in place/ allergy band as needed   Assess for fall risk   Initiate fall precautions as applicable (fall band, side rails, etc.)   Call light within reach   Bed in low position/ wheels locked    __met__ Pain   GOAL:  Patient pain will be under control while here in the Urgent Care      Assess pain level and characteristics   Administer analgesics as ordered   Assess effectiveness of pain management and report to MD as needed    _met__ Knowledge Deficit:   GOAL: Patient will be educated on the medication they are receiving here in the Urgent Care   Assess baseline knowledge   Provide teaching at level of understanding   Provide teaching via preferred learning method   Evaluate teaching effectiveness    __met__ Hemodynamic/Respiratory Status:   GOAL: Patient vital signs will be assessed and monitored in the Urgent Care       (Pre and Post Procedure Monitoring)   Assess/Monitor vital signs and LOC   Assess Baseline SpO2 prior to any sedation   Obtain weight/height   Assess vital signs/ LOC until patient meets discharge criteria   Monitor procedure site and notify MD of any issues  CARE PLAN END DATE:   05/23/2022

## 2022-05-24 NOTE — TELEPHONE ENCOUNTER
Spoke to pt, he understood the last order he did all 4 injections and he has a new order for Retacrit. He wants it at [address] [place] [address] on Monday at 1 pm.    [facility] Scheduling, spoke to [address] [place] [address] is scheduled for 5/31/22 at 1 pm. Pt is aware. Faxing order over to [facility]. Lab order pending.

## 2022-05-24 NOTE — PROGRESS NOTES
Called to f/u from IV Lasix - reports he urinated a lot   Breathing feels relatively same - still using 2-3L   He has noticed HR running higher - 100s  BP was running high yesterday during Fe infusion = -180s  Again notes bad HA over the weekend - \"about through in the towel\"  Inquired if he was taking all his meds and he now recalls he has been out of Coreg for over a week - normally on 50 mg BID  Encouraged to make sure he calls if runs out meds!!  CardioMEMs PAD is down to 33 from 39   Plan was to increase Bumex back to 1 BID however appears uncontrolled HTN likely cause of recent CHF exac and symptoms.     Instructed to take Bumex 1 BID today and tomorrow   Decrease Aldactone to 12.5/day = recent K+ 5.0  Will monitor PAD pressures and call to f/u in few days

## 2022-05-25 ENCOUNTER — OFFICE VISIT (OUTPATIENT)
Dept: PHYSICAL MEDICINE AND REHAB | Age: 62
End: 2022-05-25
Payer: MEDICARE

## 2022-05-25 VITALS
BODY MASS INDEX: 41.75 KG/M2 | WEIGHT: 315 LBS | SYSTOLIC BLOOD PRESSURE: 128 MMHG | DIASTOLIC BLOOD PRESSURE: 80 MMHG | HEIGHT: 73 IN

## 2022-05-25 DIAGNOSIS — M48.062 SPINAL STENOSIS OF LUMBAR REGION WITH NEUROGENIC CLAUDICATION: ICD-10-CM

## 2022-05-25 DIAGNOSIS — M47.816 LUMBAR FACET ARTHROPATHY: ICD-10-CM

## 2022-05-25 DIAGNOSIS — M47.816 SPONDYLOSIS OF LUMBAR REGION WITHOUT MYELOPATHY OR RADICULOPATHY: Primary | ICD-10-CM

## 2022-05-25 DIAGNOSIS — M54.17 LUMBOSACRAL RADICULITIS: ICD-10-CM

## 2022-05-25 DIAGNOSIS — M25.561 CHRONIC PAIN OF RIGHT KNEE: ICD-10-CM

## 2022-05-25 DIAGNOSIS — M17.11 PRIMARY OSTEOARTHRITIS OF RIGHT KNEE: ICD-10-CM

## 2022-05-25 DIAGNOSIS — G89.4 CHRONIC PAIN SYNDROME: ICD-10-CM

## 2022-05-25 DIAGNOSIS — G89.29 CHRONIC PAIN OF RIGHT KNEE: ICD-10-CM

## 2022-05-25 DIAGNOSIS — G62.9 NEUROPATHY: ICD-10-CM

## 2022-05-25 DIAGNOSIS — F11.90 CHRONIC, CONTINUOUS USE OF OPIOIDS: ICD-10-CM

## 2022-05-25 PROCEDURE — 99214 OFFICE O/P EST MOD 30 MIN: CPT | Performed by: NURSE PRACTITIONER

## 2022-05-25 PROCEDURE — G8427 DOCREV CUR MEDS BY ELIG CLIN: HCPCS | Performed by: NURSE PRACTITIONER

## 2022-05-25 PROCEDURE — 3017F COLORECTAL CA SCREEN DOC REV: CPT | Performed by: NURSE PRACTITIONER

## 2022-05-25 PROCEDURE — G8417 CALC BMI ABV UP PARAM F/U: HCPCS | Performed by: NURSE PRACTITIONER

## 2022-05-25 PROCEDURE — 4004F PT TOBACCO SCREEN RCVD TLK: CPT | Performed by: NURSE PRACTITIONER

## 2022-05-25 RX ORDER — GABAPENTIN 300 MG/1
300 CAPSULE ORAL NIGHTLY
Qty: 30 CAPSULE | Refills: 0 | Status: SHIPPED | OUTPATIENT
Start: 2022-05-25 | End: 2022-06-23

## 2022-05-25 RX ORDER — OXYCODONE AND ACETAMINOPHEN 10; 325 MG/1; MG/1
1 TABLET ORAL EVERY 8 HOURS PRN
Qty: 90 TABLET | Refills: 0 | Status: SHIPPED | OUTPATIENT
Start: 2022-05-25 | End: 2022-06-17 | Stop reason: SDUPTHER

## 2022-05-25 ASSESSMENT — ENCOUNTER SYMPTOMS
SHORTNESS OF BREATH: 1
CHEST TIGHTNESS: 1
WHEEZING: 0
COUGH: 0
BACK PAIN: 1
STRIDOR: 0
ABDOMINAL PAIN: 1

## 2022-05-25 NOTE — PROGRESS NOTES
[address] [facility] [address]  Dept: [phone]  Dept Fax: [phone]: [phone]    Visit Date: 5/25/2022    Functionality Assessment/Goals Worksheet     On a scale of 0 (Does not Interfere) to 10 (Completely Interferes)     1. Which number describes how during the past week pain has interfered with       the following:  A. General Activity:  8  B. Mood: 9  C. Walking Ability:  8  D. Normal Work (Includes both work outside the home and housework):  9  E. Relations with Other People:   8  F. Sleep:   9  G. Enjoyment of Life:   10    2. Patient Prefers to Take their Pain Medications:     [x]  On a regular basis   [x]  Only when necessary    []  Does not take pain medications    3. What are the Patient's Goals/Expectations for Visiting Pain Management? []  Learn about my pain    []  Receive Medication   []  Physical Therapy     []  Treat Depression   [x]  Receive Injections    []  Treat Sleep   []  Deal with Anxiety and Stress   []  Treat Opoid Dependence/Addiction   []  Other:      HPI:   Noe Hager is a 63 y.o. male is here today for    Chief Complaint: Low back pain, leg pain     HPI   6 week FU FU for reevaluation. Patient is all healed from abdominal surgery incision healed nicely without any openings or signs of infection. Continues to have a main complaint of pain in lower back radiating down into buttocks/ SI area and down right lower extremity posteriorly to feet. Intermittently pain in left leg. Throbbing aching pain in low back and numbness in leg and feet     Also having some joint pain in bilateral hands aching and stiff and bilateral knees     Ambulating with quad cane     Has been being treated for several medical issues CHF, anemia, and CKD. Had 1 visist in April. Now on oxygen per nasal cannula.    Pain increases with bending, lifting, twisting , walking, standing, getting up and down and housework or working at job. Trying to stay active Pain medications remain effective. Patient wants to hold procedures at this time d/t medical issues         Medications reviewed. Patient denies side effects with medications. Patient states he is taking medications as prescribed. Hedenies receiving pain medications from other sources. He had 1 ER visit had 4 day hospital admission since last visit      Pain scale with out pain medications or at its worst is 8/10. Pain scale with pain medications or at its best is 5-6/10. Last dose of Percocet and neurontin was last night   Drug screen reviewed from 4/13/2022 and was appropriate  Pill count completed  today and WNL: Yes      The patientis allergic to shellfish-derived products, pcn [penicillins], succinylcholine, sulfa antibiotics, morphine, and tape [adhesive tape]. Subjective:      Review of Systems   Constitutional: Positive for fatigue. Negative for fever. Respiratory: Positive for chest tightness and shortness of breath. Negative for cough, wheezing and stridor. On oxygen per nasal cannula    Cardiovascular: Positive for leg swelling. Anemia   Gastrointestinal: Positive for abdominal pain. Incision healing    Musculoskeletal: Positive for arthralgias, back pain, gait problem, joint swelling, myalgias and neck stiffness. Negative for neck pain. Ambulating with quad cane    Skin: Negative. Neurological: Positive for weakness and numbness. Psychiatric/Behavioral: Negative. Objective:     Vitals:    05/25/22 0849   BP: 128/80   Weight: (!) 329 lb (149.2 kg)   Height: 6' 1\" (1.854 m)       Physical Exam  Constitutional:       General: He is not in acute distress. Appearance: Normal appearance. He is obese. He is not ill-appearing, toxic-appearing or diaphoretic. HENT:      Head: Normocephalic and atraumatic.       Right Ear: External ear normal. There is no impacted cerumen. Left Ear: External ear normal. There is no impacted cerumen. Nose: Nose normal. No congestion or rhinorrhea. Mouth/Throat:      Mouth: Mucous membranes are moist.      Pharynx: Oropharynx is clear. Eyes:      Extraocular Movements: Extraocular movements intact. Conjunctiva/sclera: Conjunctivae normal.      Pupils: Pupils are equal, round, and reactive to light. Cardiovascular:      Rate and Rhythm: Normal rate and regular rhythm. Pulses: Normal pulses. Heart sounds: Murmur heard. Pulmonary:      Effort: Pulmonary effort is normal. No respiratory distress. Breath sounds: Normal breath sounds. Comments: On 2 liters of oxygen per nasal cannula  Abdominal:      General: Abdomen is flat. Bowel sounds are normal. There is no distension. Palpations: Abdomen is soft. There is no mass. Tenderness: There is no abdominal tenderness. Hernia: No hernia is present. Musculoskeletal:         General: Tenderness present. Right shoulder: Tenderness present. Decreased range of motion. Right wrist: Tenderness present. Decreased range of motion. Left wrist: Tenderness present. Decreased range of motion. Cervical back: Neck supple. Tenderness and bony tenderness present. Muscular tenderness present. Thoracic back: Bony tenderness present. Decreased range of motion. Lumbar back: Tenderness and bony tenderness present. Decreased range of motion. Positive right straight leg raise test and positive left straight leg raise test.        Back:       Right hip: Decreased range of motion. Decreased strength. Left hip: Decreased range of motion. Decreased strength. Right knee: Swelling present. Decreased range of motion. Tenderness present. Right lower leg: Edema present. Left lower leg: Edema present. Right ankle: Swelling present. Tenderness present. Decreased range of motion. Left ankle: Tenderness present. Skin:     General: Skin is warm and dry. Capillary Refill: Capillary refill takes less than 2 seconds. Neurological:      General: No focal deficit present. Mental Status: He is alert and oriented to person, place, and time. Sensory: Sensory deficit present. Motor: Weakness present. Coordination: Romberg sign positive. Coordination abnormal.      Gait: Gait abnormal.      Deep Tendon Reflexes:      Reflex Scores:       Tricep reflexes are 2+ on the right side and 2+ on the left side. Bicep reflexes are 2+ on the right side and 2+ on the left side. Brachioradialis reflexes are 2+ on the right side and 2+ on the left side. Patellar reflexes are 1+ on the right side and 1+ on the left side. Achilles reflexes are 1+ on the right side and 1+ on the left side. Comments: 4/5/strength bilateral lower extremity    Decreased sensation bilateral feet    Psychiatric:         Mood and Affect: Mood normal.         Behavior: Behavior normal.       SAUNDRA  Patricks test  positive  Yeoman's  positive  Gaenslen's  positive       Assessment:     1. 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 Lumbar facet arthropathy    3. Spinal stenosis of lumbar region with neurogenic claudication    4. Lumbosacral radiculitis    5. Primary osteoarthritis of right knee    6. Chronic pain of right knee    7. Neuropathy    8. Chronic pain syndrome    9. Chronic, continuous use of opioids            Plan:      · OARRS reviewed. Current MED: 45.00  · Patient was offered naloxone for home. · Discussed long term side effects of medications, tolerance, dependency and addiction. · Previous UDS reviewed  · UDS preformed today for compliance. · Patient told can not receive any pain medications from any other source. · No evidence of abuse, diversion or aberrant behavior.    Medications and/or procedures to improve function and quality of life- patient understanding with this and that may not be pain free   Discussed with patient about safe storage of medications at home   Discussed possible weaning of medication dosing dependent on treatment/procedure results.  Discussed with patient about risks with procedure including infection, reaction to medication, increased pain, or bleeding.  All healed from abdominal surgery. Will hold off on procedures at this time d/t multiple medical issues- CHF, anemia, now on oxygen  · Discussed will move forward with procedures when more medically stable- discussed - L-facet MBB, LESI, right knee injection   · Continue pain medications Percocet 10/325 TID prn- ordered refill, Neurontin 300 mg at bedtime. Discussed caution with opoid therapy and breathing  · Will FU in 2 months or sooner if needed and will get next UDS then       Meds. Prescribed:   Orders Placed This Encounter   Medications    oxyCODONE-acetaminophen (PERCOCET)  MG per tablet     Sig: Take 1 tablet by mouth every 8 hours as needed for Pain for up to 30 days. Intended supply: 30 days     Dispense:  90 tablet     Refill:  0     Reduce doses taken as pain becomes manageable    gabapentin (NEURONTIN) 300 MG capsule     Sig: Take 1 capsule by mouth nightly for 30 days. Dispense:  30 capsule     Refill:  0       Return in about 2 months (around 7/25/2022), or if symptoms worsen or fail to improve, for follow up  for medications.                Electronically signed by SAHIL Pierce CNP on5/25/2022 at 9:20 AM

## 2022-05-27 ENCOUNTER — TELEPHONE (OUTPATIENT)
Dept: CARDIOLOGY CLINIC | Age: 62
End: 2022-05-27

## 2022-05-27 NOTE — TELEPHONE ENCOUNTER
Called and spoke to Mary Ellen Miller - had not sent CardioMEMs in 2 days - called to inquire about sending reading - he states he has felt very ill w/ diarrhea and vomiting - \"uncontrollable diarrhea\" x 2 days. Went and did reading this AM - PAD was 28 mmHg (down from 39 mmHg 4 days ago). He states he's backed off on diuretics do to illness (questions if food poisoning) - only taking absolutely necessary meds - was able to  Coreg. Headache is improved. Wearing O2 - struggled/winded going up stairs. Very fatigued!!  Encouraged to come to ED -  Not interested at this time. Only taking Bumex 1 mg/day - instructed to hold off today, as well as Spironolactone.

## 2022-05-31 ENCOUNTER — HOSPITAL ENCOUNTER (OUTPATIENT)
Dept: GENERAL RADIOLOGY | Age: 62
Discharge: HOME | End: 2022-05-31

## 2022-05-31 ENCOUNTER — OFFICE VISIT (OUTPATIENT)
Dept: PULMONOLOGY | Age: 62
End: 2022-05-31
Payer: MEDICARE

## 2022-05-31 VITALS
HEART RATE: 75 BPM | OXYGEN SATURATION: 98 % | WEIGHT: 315 LBS | TEMPERATURE: 97.3 F | BODY MASS INDEX: 41.75 KG/M2 | HEIGHT: 73 IN | DIASTOLIC BLOOD PRESSURE: 84 MMHG | SYSTOLIC BLOOD PRESSURE: 120 MMHG

## 2022-05-31 DIAGNOSIS — J96.11 CHRONIC RESPIRATORY FAILURE WITH HYPOXIA (HCC): ICD-10-CM

## 2022-05-31 DIAGNOSIS — G47.09 OTHER INSOMNIA: ICD-10-CM

## 2022-05-31 DIAGNOSIS — I50.32 CHRONIC DIASTOLIC HEART FAILURE (HCC): ICD-10-CM

## 2022-05-31 DIAGNOSIS — D63.1 ANEMIA OF CHRONIC RENAL FAILURE, UNSPECIFIED CKD STAGE: ICD-10-CM

## 2022-05-31 DIAGNOSIS — N18.9 ANEMIA OF CHRONIC RENAL FAILURE, UNSPECIFIED CKD STAGE: ICD-10-CM

## 2022-05-31 DIAGNOSIS — G25.81 RLS (RESTLESS LEGS SYNDROME): ICD-10-CM

## 2022-05-31 DIAGNOSIS — G47.33 OBSTRUCTIVE SLEEP APNEA ON CPAP: Primary | ICD-10-CM

## 2022-05-31 DIAGNOSIS — Z99.89 OBSTRUCTIVE SLEEP APNEA ON CPAP: Primary | ICD-10-CM

## 2022-05-31 DIAGNOSIS — E66.01 MORBID OBESITY WITH BMI OF 40.0-44.9, ADULT (HCC): ICD-10-CM

## 2022-05-31 PROCEDURE — G8427 DOCREV CUR MEDS BY ELIG CLIN: HCPCS | Performed by: PHYSICIAN ASSISTANT

## 2022-05-31 PROCEDURE — 4004F PT TOBACCO SCREEN RCVD TLK: CPT | Performed by: PHYSICIAN ASSISTANT

## 2022-05-31 PROCEDURE — G8417 CALC BMI ABV UP PARAM F/U: HCPCS | Performed by: PHYSICIAN ASSISTANT

## 2022-05-31 PROCEDURE — 99214 OFFICE O/P EST MOD 30 MIN: CPT | Performed by: PHYSICIAN ASSISTANT

## 2022-05-31 PROCEDURE — 3017F COLORECTAL CA SCREEN DOC REV: CPT | Performed by: PHYSICIAN ASSISTANT

## 2022-05-31 RX ORDER — ACETAMINOPHEN 325 MG/1
650 TABLET ORAL
OUTPATIENT
Start: 2022-05-31

## 2022-05-31 RX ORDER — ALBUTEROL SULFATE 90 UG/1
4 AEROSOL, METERED RESPIRATORY (INHALATION) PRN
OUTPATIENT
Start: 2022-05-31

## 2022-05-31 RX ORDER — DIPHENHYDRAMINE HYDROCHLORIDE 50 MG/ML
50 INJECTION INTRAMUSCULAR; INTRAVENOUS
OUTPATIENT
Start: 2022-05-31

## 2022-05-31 RX ORDER — ONDANSETRON 2 MG/ML
8 INJECTION INTRAMUSCULAR; INTRAVENOUS
OUTPATIENT
Start: 2022-05-31

## 2022-05-31 RX ORDER — SODIUM CHLORIDE 9 MG/ML
INJECTION, SOLUTION INTRAVENOUS CONTINUOUS
OUTPATIENT
Start: 2022-05-31

## 2022-05-31 ASSESSMENT — ENCOUNTER SYMPTOMS
NAUSEA: 0
COUGH: 0
SHORTNESS OF BREATH: 1
ALLERGIC/IMMUNOLOGIC NEGATIVE: 1
CHEST TIGHTNESS: 0
DIARRHEA: 1
BACK PAIN: 0
STRIDOR: 0
EYES NEGATIVE: 1
WHEEZING: 0

## 2022-05-31 NOTE — PROGRESS NOTES
[facility]      Louisa Herrera         [id number]  5/31/2022   Chief Complaint   Patient presents with    Follow-up     hosp after pulse oximetry 5/25/22 W DL         Pt of Danica Brock    PAP Download:   Original or initial AHI: 49     Date of initial study: 9/12/15      Compliant  83%     Noncompliant 3 %     PAP Type Aircurve 10 Vauto Level  13-17   Avg Hrs/Day 9 hrs 37 mins   AHI: 0.5   Recorded compliance dates ,4/30/22-5/29/22  Machine/Mfg:   [x] ResMed    [] Respironics/Dreamstation   Interface:   [] Nasal    [] Nasal pillows   [x] FFM      Provider:      [x] [facility]     [][employer]     [] [employer]    [] Evaline Colder    [] [facility]               [] [employer]      [] [employer]    [] [address]      [] Other    Neck Size: 20.75  Mallampati Mallampati 4  ESS:  17  SAQLI: 48    Here is a scan of the most recent download:            Presentation:   Chuck Reagan presents for sleep medicine follow up for obstructive sleep apnea  Since the last visit, Chuck Reagan is doing well with PAP. He is sleeping ok. He was started on O2 at 2 liters from CHF clinic. He had overnight pulse ox on Bipap. He wants pressure increased slightly for comfort. Taking Elavil and Doxepin for insomnia with benefit. . Taking Requip for RLS with some benefit. Equipment issues: The pressure is  acceptable, the mask is acceptable     Sleep issues:  Do you feel better? Yes  More rested? Yes   Better concentration? yes    Progress History:   Since last visit any new medical issues? CHF  New ER or hospital visits? Yes CHF  Any new or changes in medicines? No  Any new sleep medicines? No    Review of Systems -   Review of Systems   Constitutional: Negative for activity change, appetite change, chills and fever. HENT: Negative for congestion and postnasal drip. Eyes: Negative. Respiratory: Positive for shortness of breath. Negative for cough, chest tightness, wheezing and stridor.     Cardiovascular: Negative for chest pain and leg swelling. Gastrointestinal: Positive for diarrhea. Negative for nausea. Endocrine: Negative. Genitourinary: Negative. Musculoskeletal: Negative. Negative for arthralgias and back pain. Skin: Negative. Allergic/Immunologic: Negative. Neurological: Negative. Negative for dizziness and light-headedness. Psychiatric/Behavioral: Negative. All other systems reviewed and are negative. Physical Exam:    BMI:  Body mass index is 43.27 kg/m². Wt Readings from Last 3 Encounters:   05/31/22 (!) 328 lb (148.8 kg)   05/25/22 (!) 329 lb (149.2 kg)   05/17/22 (!) 329 lb (149.2 kg)     Weight stable / unchanged  Vitals: /84 (Site: Left Upper Arm, Position: Sitting, Cuff Size: Medium Adult)   Pulse 75   Temp 97.3 °F (36.3 °C) (Temporal)   Ht 6' 1\" (1.854 m)   Wt (!) 328 lb (148.8 kg)   SpO2 98% Comment: on 2 liters  BMI 43.27 kg/m²       Physical Exam  Constitutional:       Appearance: Normal appearance. He is normal weight. HENT:      Head: Normocephalic and atraumatic. Right Ear: External ear normal.      Left Ear: External ear normal.      Nose: Nose normal.   Eyes:      Extraocular Movements: Extraocular movements intact. Conjunctiva/sclera: Conjunctivae normal.      Pupils: Pupils are equal, round, and reactive to light. Pulmonary:      Effort: Pulmonary effort is normal.   Musculoskeletal:      Cervical back: Normal range of motion and neck supple. Neurological:      General: No focal deficit present. Mental Status: He is alert and oriented to person, place, and time. Psychiatric:         Attention and Perception: Attention and perception normal.         Mood and Affect: Mood and affect normal.         Speech: Speech normal.         Behavior: Behavior normal. Behavior is cooperative. Thought Content:  Thought content normal.         Cognition and Memory: Cognition normal.         Judgment: Judgment normal.         ECHO 04/22/2022 Findings      Mitral Valve   The mitral valve structure was normal with normal leaflet separation. DOPPLER: The transmitral velocity was within the normal range with no   evidence for mitral stenosis. There was no evidence of mitral   regurgitation. Aortic Valve   The aortic valve was trileaflet with normal thickness and cuspal   separation. DOPPLER: Transaortic velocity was within the normal range with   no evidence of aortic stenosis. There was no evidence of aortic   regurgitation. Tricuspid Valve   The tricuspid valve structure was normal with normal leaflet separation. DOPPLER: There was no evidence of tricuspid stenosis. There was no   evidence of tricuspid regurgitation. Pulmonic Valve   The pulmonic valve was not well visualized . Trivial pulmonic regurgitation visualized. Left Atrium   Left atrial size was normal.      Left Ventricle   Ejection fraction is visually estimated at 55%. Overall left ventricular function is normal.      Right Atrium   Right atrial size was normal.      Right Ventricle   The right ventricular size was normal with normal systolic function and   wall thickness. Pericardial Effusion   The pericardium was normal in appearance with no evidence of a pericardial   effusion. ASSESSMENT/DIAGNOSIS     Diagnosis Orders   1. Obstructive sleep apnea on CPAP     2. Morbid obesity with BMI of 40.0-44.9, adult ([address]     3. Chronic diastolic heart failure ([address]     4. Chronic respiratory failure with hypoxia (HCC)     5. Other insomnia     6. RLS (restless legs syndrome)            Plan   Do you need any equipment today? Yes update supplies  - continue Requip- improving anemia should also help  - Continue Doxepin and Elavil   - No O2 needed with PAP  - Increase pressure to 18/14  - Download reviewed and discussed with patient  - He  was advised to continue current positive airway pressure therapy with above described pressure.    - He  advised to keep good compliance with current recommended pressure to get optimal results and clinical improvement  - Recommend 7-9 hours of sleep with PAP  - He was advised to call DME company regarding supplies if needed.   -He call my office for earlier appointment if needed for worsening of sleep symptoms.   - He was instructed on weight loss  - Nesha Alverto was educated about my impression and plan. Patient verbalizesunderstanding.   We will see Elena Zepeda back in: 4 months with download    Information added by my medical assistant/LPN was reviewed today         Celio Naqvi PA-C, MPAS  5/31/2022

## 2022-06-06 RX ORDER — SPIRONOLACTONE 50 MG/1
TABLET, FILM COATED ORAL
Qty: 180 TABLET | OUTPATIENT
Start: 2022-06-06

## 2022-06-13 ENCOUNTER — TELEPHONE (OUTPATIENT)
Dept: CARDIOLOGY CLINIC | Age: 62
End: 2022-06-13

## 2022-06-13 DIAGNOSIS — I50.32 CHF (CONGESTIVE HEART FAILURE), NYHA CLASS II, CHRONIC, DIASTOLIC (HCC): ICD-10-CM

## 2022-06-13 DIAGNOSIS — D64.9 ANEMIA, UNSPECIFIED TYPE: Primary | ICD-10-CM

## 2022-06-13 DIAGNOSIS — R19.7 DIARRHEA, UNSPECIFIED TYPE: ICD-10-CM

## 2022-06-13 RX ORDER — DOXEPIN HYDROCHLORIDE 25 MG/1
CAPSULE ORAL
Qty: 60 CAPSULE | Refills: 3 | Status: SHIPPED | OUTPATIENT
Start: 2022-06-13 | End: 2022-10-10

## 2022-06-13 NOTE — TELEPHONE ENCOUNTER
Call and check on pt - no recent CardioMEMs sent and last one on 6/9 was up to 37mmHg   Baseline PAD closer to 26-31 mmHg  Was suppose to get blood work as well - BMP and H/H

## 2022-06-13 NOTE — TELEPHONE ENCOUNTER
Spoke to patient will send cardio mem, pt will do labs soon, has had issues with diarrhea .    isaura advise?

## 2022-06-14 RX ORDER — METOLAZONE 2.5 MG/1
2.5 TABLET ORAL DAILY PRN
Qty: 10 TABLET | Refills: 2 | Status: SHIPPED | OUTPATIENT
Start: 2022-06-14 | End: 2022-06-30 | Stop reason: ALTCHOICE

## 2022-06-14 NOTE — TELEPHONE ENCOUNTER
Called and spoke to Facundo Agee  PAD is trending upward again   He notes he feels like \"holding onto fluid\"  Been taking Bumex 2mg/day for few days. Diarrhea seems better but then keeps coming back = discussed GI eval - pt agrees. [facility] (thinks hes seen Josebrittany Tariq in past)  Urine output fair - \"dont go as well used to\"  Will send Rx for metolazone PRN = pt to p/u tomorrow   Overdue for BMP and H/H = also missed Fe infusion  Encouraged him to call and see if can get rescheduled for Fe tomorrow and instructed him get blood work tomorrow.

## 2022-06-15 NOTE — TELEPHONE ENCOUNTER
Spoke with Fariha at Dr. Maddi Mack office   Patient has a financial note on his chart   They will contact financial department then contact patient regarding scheduling an appointment

## 2022-06-16 ENCOUNTER — PATIENT MESSAGE (OUTPATIENT)
Dept: PHYSICAL MEDICINE AND REHAB | Age: 62
End: 2022-06-16

## 2022-06-16 DIAGNOSIS — G89.4 CHRONIC PAIN SYNDROME: ICD-10-CM

## 2022-06-17 RX ORDER — OXYCODONE AND ACETAMINOPHEN 10; 325 MG/1; MG/1
1 TABLET ORAL EVERY 8 HOURS PRN
Qty: 90 TABLET | Refills: 0 | Status: SHIPPED | OUTPATIENT
Start: 2022-06-17 | End: 2022-07-19 | Stop reason: SDUPTHER

## 2022-06-17 NOTE — TELEPHONE ENCOUNTER
From: Louisa Herrera  To:  Jurgen Gotti  Sent: 6/16/2022 5:09 PM EDT  Subject: Refill     Hi could I please get a refill for my percet 10/325 thank you Thais Snowden

## 2022-06-17 NOTE — TELEPHONE ENCOUNTER
OARRS reviewed. UDS: + for  Gabapentin, Oxycodone -consistent. Last seen: 5/25/2022.  Follow-up: 7/27/2022

## 2022-06-21 NOTE — TELEPHONE ENCOUNTER
SPOKE TO PATIENT AND HE STATES HE IS GOING TO GET HIS LABS DONE TODAY. HE WAS ADVISED TO TAKE METOLAZONE PRN CONSERVATIVELY AND HE VOICED UNDERSTANDING.

## 2022-06-22 NOTE — ADDENDUM NOTE
Encounter addended by: Kev Bran on: 6/22/2022 10:42 AM   Actions taken: Charge Capture section accepted

## 2022-06-23 ENCOUNTER — TELEPHONE (OUTPATIENT)
Dept: CARDIOLOGY CLINIC | Age: 62
End: 2022-06-23

## 2022-06-23 DIAGNOSIS — G89.4 CHRONIC PAIN SYNDROME: ICD-10-CM

## 2022-06-23 RX ORDER — GABAPENTIN 300 MG/1
300 CAPSULE ORAL NIGHTLY
Qty: 30 CAPSULE | Refills: 0 | Status: SHIPPED | OUTPATIENT
Start: 2022-06-24 | End: 2022-07-19

## 2022-06-27 DIAGNOSIS — I50.32 CHF (CONGESTIVE HEART FAILURE), NYHA CLASS II, CHRONIC, DIASTOLIC (HCC): Primary | ICD-10-CM

## 2022-06-27 PROCEDURE — 93264 REM MNTR WRLS P-ART PRS SNR: CPT | Performed by: NURSE PRACTITIONER

## 2022-06-29 ENCOUNTER — HOSPITAL ENCOUNTER (OUTPATIENT)
Age: 62
Discharge: HOME | End: 2022-06-29
Payer: MEDICARE

## 2022-06-29 DIAGNOSIS — N18.30 ANEMIA IN STAGE 3 CHRONIC KIDNEY DISEASE, UNSPECIFIED WHETHER STAGE 3A OR 3B CKD (HCC): ICD-10-CM

## 2022-06-29 DIAGNOSIS — I50.32 CHF (CONGESTIVE HEART FAILURE), NYHA CLASS II, CHRONIC, DIASTOLIC (HCC): ICD-10-CM

## 2022-06-29 DIAGNOSIS — N18.32 STAGE 3B CHRONIC KIDNEY DISEASE (HCC): ICD-10-CM

## 2022-06-29 DIAGNOSIS — D63.1 ANEMIA IN STAGE 3 CHRONIC KIDNEY DISEASE, UNSPECIFIED WHETHER STAGE 3A OR 3B CKD (HCC): ICD-10-CM

## 2022-06-29 DIAGNOSIS — N17.9 AKI (ACUTE KIDNEY INJURY) (HCC): ICD-10-CM

## 2022-06-29 DIAGNOSIS — N18.2 CKD (CHRONIC KIDNEY DISEASE), STAGE II: ICD-10-CM

## 2022-06-29 LAB
ANION GAP SERPL CALCULATED.3IONS-SCNC: 17 MEQ/L (ref 8–16)
BUN BLDV-MCNC: 51 MG/DL (ref 7–22)
CALCIUM SERPL-MCNC: 9.4 MG/DL (ref 8.5–10.5)
CHLORIDE BLD-SCNC: 94 MEQ/L (ref 98–111)
CO2: 26 MEQ/L (ref 23–33)
CREAT SERPL-MCNC: 3.7 MG/DL (ref 0.4–1.2)
GFR SERPL CREATININE-BSD FRML MDRD: 17 ML/MIN/1.73M2
GLUCOSE BLD-MCNC: 114 MG/DL (ref 70–108)
HCT VFR BLD CALC: 32.4 % (ref 42–52)
HEMOGLOBIN: 9.7 GM/DL (ref 14–18)
MAGNESIUM: 1.9 MG/DL (ref 1.6–2.4)
POTASSIUM SERPL-SCNC: 4.8 MEQ/L (ref 3.5–5.2)
SODIUM BLD-SCNC: 137 MEQ/L (ref 135–145)

## 2022-06-29 PROCEDURE — 36415 COLL VENOUS BLD VENIPUNCTURE: CPT

## 2022-06-29 PROCEDURE — 84156 ASSAY OF PROTEIN URINE: CPT

## 2022-06-29 PROCEDURE — 82570 ASSAY OF URINE CREATININE: CPT

## 2022-06-29 PROCEDURE — 85014 HEMATOCRIT: CPT

## 2022-06-29 PROCEDURE — 80048 BASIC METABOLIC PNL TOTAL CA: CPT

## 2022-06-29 PROCEDURE — 85018 HEMOGLOBIN: CPT

## 2022-06-29 PROCEDURE — 83735 ASSAY OF MAGNESIUM: CPT

## 2022-06-30 LAB
CREATININE URINE: 129.4 MG/DL
PROT/CREAT RATIO, UR: 4.22
PROTEIN, URINE: 545.7 MG/DL

## 2022-06-30 NOTE — TELEPHONE ENCOUNTER
Pt w/ worsening CAROLE/CKD - attempted to call pt x2 - no answer, left message to return call. Pt needs to hold Bumex until repeat labs - STOP Aldactone and Metolazone. Pt instructed to use Metolazone very conservatively - questioning overuse. Repeat BMP this Saturday.

## 2022-06-30 NOTE — TELEPHONE ENCOUNTER
Pt returned call - admits taking Metolazone daily for the past week - not sure why though - when inquired on s/s states breathing ok and wts stable. States he's been in some \"dark days\" lately d/t ongoing decline in health. States just saw [place] today - suppose to do some testing (?Cdiff). Still having diarrhea. BP 120s/70s  Urine output good per pt. Instructed to hold Bumex till repeat labs - get this Saturday. STOP Aldactone and Metolazone. Pt voices understanding.

## 2022-07-07 ENCOUNTER — OFFICE VISIT (OUTPATIENT)
Dept: CARDIOLOGY CLINIC | Age: 62
End: 2022-07-07
Payer: MEDICARE

## 2022-07-07 ENCOUNTER — HOSPITAL ENCOUNTER (OUTPATIENT)
Age: 62
Discharge: HOME | End: 2022-07-07
Payer: MEDICARE

## 2022-07-07 VITALS
WEIGHT: 315 LBS | SYSTOLIC BLOOD PRESSURE: 158 MMHG | BODY MASS INDEX: 41.75 KG/M2 | HEIGHT: 73 IN | DIASTOLIC BLOOD PRESSURE: 90 MMHG | HEART RATE: 78 BPM

## 2022-07-07 DIAGNOSIS — I50.32 CHF (CONGESTIVE HEART FAILURE), NYHA CLASS II, CHRONIC, DIASTOLIC (HCC): ICD-10-CM

## 2022-07-07 DIAGNOSIS — I42.0 DILATED CARDIOMYOPATHY (HCC): Primary | ICD-10-CM

## 2022-07-07 PROCEDURE — G8427 DOCREV CUR MEDS BY ELIG CLIN: HCPCS | Performed by: INTERNAL MEDICINE

## 2022-07-07 PROCEDURE — 4004F PT TOBACCO SCREEN RCVD TLK: CPT | Performed by: INTERNAL MEDICINE

## 2022-07-07 PROCEDURE — G8417 CALC BMI ABV UP PARAM F/U: HCPCS | Performed by: INTERNAL MEDICINE

## 2022-07-07 PROCEDURE — 99213 OFFICE O/P EST LOW 20 MIN: CPT | Performed by: INTERNAL MEDICINE

## 2022-07-07 PROCEDURE — 80048 BASIC METABOLIC PNL TOTAL CA: CPT

## 2022-07-07 PROCEDURE — 3017F COLORECTAL CA SCREEN DOC REV: CPT | Performed by: INTERNAL MEDICINE

## 2022-07-07 NOTE — TELEPHONE ENCOUNTER
Spoke with patient   He is feeling a little better today  Finally got stool specimen to send in today  Has appointment today with Dr Kelly Griffith and will get labs done today

## 2022-07-07 NOTE — PROGRESS NOTES
[facility],[place] [address] [address] [place] [address]  Dept: [phone]  Dept Fax: [phone]  Loc: [phone]    Visit Date: 7/7/2022    Mr. Marija Gutierrez is a 63 y.o. male  who presented for:  Chief Complaint   Patient presents with    6 Month Follow-Up    Congestive Heart Failure    Shortness of Breath       HPI:   62 yo M  c hx of HTN, DM, Obesity, LAURIE on BIPAP, Cardiomyopathy, colon resection 2/2017 is here for a follow up. Underwent cardioMEMs device for CHF. He comes today doing well without any issues. Has chronic shortness of breath. Takes diuretics regularly, follows up with CHF clinic. Has been placed on O2 supplementation since 2 months. Today he walked from parking lot and his BP is 180/102. States at home its well controlled. Has been having diarrhea. Left heart cath:  Clean coronaries    RIGHT HEART CATHETERIZATION:  1.  RA is 25.  2.  RV is 80/23, 27.  3.  PA is 82/44, 59.  4.  Wedge pressure is 35.  5.  LV is 188/28, 36.  6.  AO was 205/126, 160.  7.  AO saturation is 91%. 8.  PA saturation is 63%. 9.  Cardiac output by Cherelle method is 5.9. 10.  Cardiac index is 2.1. Current Outpatient Medications:     gabapentin (NEURONTIN) 300 MG capsule, Take 1 capsule by mouth nightly for 30 days. , Disp: 30 capsule, Rfl: 0    oxyCODONE-acetaminophen (PERCOCET)  MG per tablet, Take 1 tablet by mouth every 8 hours as needed for Pain for up to 30 days.  Intended supply: 30 days, Disp: 90 tablet, Rfl: 0    doxepin (SINEQUAN) 25 MG capsule, take 2 capsules by mouth at bedtime, Disp: 60 capsule, Rfl: 3    CPAP Machine MISC, by Does not apply route Please change bipap to 18/14 cm H20., Disp: 1 each, Rfl: 0    carvedilol (COREG) 25 MG tablet, Take 2 tablets by mouth 2 times daily, Disp: 120 tablet, Rfl: 5    RA MELATONIN 3-2 MG TABS, TAKE 1 AND 1/2 TABLETS BY MOUTH NIGHTLY AS NEEDED FOR SLEEP, Disp: , Rfl:    oxybutynin (DITROPAN-XL) 5 MG extended release tablet, Take 1 tablet by mouth at bedtime, Disp: 30 tablet, Rfl: 0    amitriptyline (ELAVIL) 10 MG tablet, Take 10 mg by mouth 2 times daily, Disp: , Rfl:     bumetanide (BUMEX) 1 MG tablet, Take 2 tablets by mouth daily AND 1 tablet as needed.  (Patient taking differently: Take 1 mg by mouth daily ), Disp: 60 tablet, Rfl: 6    potassium chloride (K-TAB) 10 MEQ extended release tablet, Take 1 tablet by mouth daily, Disp: 60 tablet, Rfl: 3    tamsulosin (FLOMAX) 0.4 MG capsule, Take 1 capsule by mouth nightly, Disp: 90 capsule, Rfl: 3    hydrALAZINE (APRESOLINE) 25 MG tablet, Take 1 tablet by mouth every 8 hours, Disp: 90 tablet, Rfl: 3    levothyroxine (SYNTHROID) 175 MCG tablet, Take 1 tablet by mouth Daily, Disp: 30 tablet, Rfl: 3    amLODIPine (NORVASC) 10 MG tablet, Take 1 tablet by mouth daily, Disp: 30 tablet, Rfl: 3    melatonin 3 MG TABS tablet, Take 1.5 tablets by mouth nightly as needed (Sleep), Disp: 45 tablet, Rfl: 3    ipratropium-albuterol (DUONEB) 0.5-2.5 (3) MG/3ML SOLN nebulizer solution, Inhale 3 mLs into the lungs every 4 hours as needed for Shortness of Breath (Patient taking differently: Inhale 1 vial into the lungs every 4 hours as needed for Shortness of Breath Usually uses at night), Disp: 360 mL, Rfl: 1    Fluticasone furoate-vilanterol (BREO ELLIPTA) 200-25 MCG/INH AEPB inhaler, Inhale 1 puff into the lungs daily, Disp: 3 each, Rfl: 3    montelukast (SINGULAIR) 10 MG tablet, Take 1 tablet by mouth nightly, Disp: 90 tablet, Rfl: 3    albuterol sulfate  (90 Base) MCG/ACT inhaler, Inhale 2 puffs into the lungs every 6 hours as needed for Wheezing or Shortness of Breath, Disp: 1 each, Rfl: 5    rOPINIRole (REQUIP) 1 MG tablet, Take 1 pill in afternoon and 2 at bedtime, Disp: 270 tablet, Rfl: 1    aspirin EC 81 MG EC tablet, Take 1 tablet by mouth daily, Disp: 90 tablet, Rfl: 1    nitroGLYCERIN (NITROSTAT) 0.4 MG SL tablet, 05/23/2022 01:27 PM    MPV 7.4 05/23/2022 01:27 PM       Lab Results   Component Value Date/Time     06/29/2022 01:44 PM    K 4.8 06/29/2022 01:44 PM    K 3.3 04/24/2022 06:08 AM    CL 94 06/29/2022 01:44 PM    CO2 26 06/29/2022 01:44 PM    BUN 51 06/29/2022 01:44 PM    LABALBU 3.0 04/20/2022 12:50 PM    CREATININE 3.7 06/29/2022 01:44 PM    CALCIUM 9.4 06/29/2022 01:44 PM    LABGLOM 17 06/29/2022 01:44 PM    GLUCOSE 114 06/29/2022 01:44 PM       Lab Results   Component Value Date/Time    ALKPHOS 104 04/20/2022 12:50 PM    ALT 17 04/20/2022 12:50 PM    AST 16 04/20/2022 12:50 PM    PROT 7.3 04/20/2022 12:50 PM    BILITOT 0.5 04/20/2022 12:50 PM    BILIDIR <0.2 04/20/2022 12:50 PM    LABALBU 3.0 04/20/2022 12:50 PM       Lab Results   Component Value Date/Time    MG 1.9 06/29/2022 01:44 PM       Lab Results   Component Value Date    INR 1.14 (H) 06/16/2021    INR 1.06 12/01/2020    INR 1.03 07/02/2019         Lab Results   Component Value Date/Time    LABA1C 4.7 05/02/2022 02:47 PM       Lab Results   Component Value Date/Time    TRIG 107 05/25/2021 03:40 AM    HDL 35 05/25/2021 03:40 AM    LDLCALC 96 05/25/2021 03:40 AM       Lab Results   Component Value Date/Time    TSH 3.090 04/15/2021 12:20 PM         Testing Reviewed:      I haveindividually reviewed the below cardiac tests    EKG:    ECHO:   Results for orders placed during the hospital encounter of 04/26/19   ECHO Complete 2D W Doppler W Color    Narrative Transthoracic Echocardiography Report (TTE)     Demographics      Patient Name   Satya Larios  Gender              Male                  A      MR #           [id number]       Race                                                     Ethnicity      Account #      [de-identified]       Room Number      Accession      [id number]       Date of Study       04/26/2019   Number      Date of Birth  1960      Referring Physician Constantin Omer PA-C Celantony Ear DO      Age            66 year(s)      Betty Hodgson                                                      [facility]                                     Interpreting        Stephanie Acosta MD                                  Physician     Procedure    Type of Study      TTE procedure:ECHOCARDIOGRAM COMPLETE 2D W DOPPLER W COLOR. Procedure Date  Date: 04/26/2019 Start: 07:22 AM    Study Location: Echo Lab  Technical Quality: Poor visualization due to body habitus. Indications:Dyspnea on exertion. Additional Medical History:Sleep apnea, hypertension, hypothyroidism    Patient Status: Routine    Height: 73 inches Weight: 352.01 pounds BSA: 2.74 m^2 BMI: 46.44 kg/m^2    BP: 182/92 mmHg     Conclusions      Summary   Technically difficult examination. Left ventricular size is normal and systolic function is mildly reduced. Ejection fraction was estimated at 40-45%. LV wall thickness is within   normal limits. The right ventricular size appears normal with normal systolic function   and wall thickness. Signature      ----------------------------------------------------------------   Electronically signed by Stephanie Acosta MD (Interpreting   physician) on 04/26/2019 at 05:05 PM   ----------------------------------------------------------------      Findings      Mitral Valve   The mitral valve was not well visualized . DOPPLER: The transmitral velocity was within the normal range with no   evidence for mitral stenosis. There was no evidence of mitral   regurgitation. Aortic Valve   The aortic valve leaflets were not well visualized. DOPPLER: Transaortic velocity was within the normal range with no evidence   of aortic stenosis. There was no evidence of aortic regurgitation. Tricuspid Valve   Tricuspid valve was not well visualized. DOPPLER: There is no evidence of tricuspid stenosis. There was no evidence   of tricuspid regurgitation.       Pulmonic Valve   The pulmonic valve was not well visualized . Priscila Batavia DOPPLER: The transpulmonic velocity was within the normal range. No   evidence for regurgitation. Left Atrium   Left atrial size is normal.      Left Ventricle   Left ventricular size is normal and systolic function is mildly reduced. Ejection fraction was estimated at 40-45%. LV wall thickness is within   normal limits. Right Atrium   Right atrial size was normal.      Right Ventricle   The right ventricular size appears normal with normal systolic function   and wall thickness. Pericardial Effusion   The pericardium appears normal with no evidence of a pericardial effusion. Pleural Effusion   No evidence of pleural effusion. Aorta / Great Vessels   -Aortic root dimension within normal limits. -IVC size is within normal limits with normal respiratory phasic changes.      M-Mode/2D Measurements & Calculations      LV Diastolic   LV Systolic Dimension:    AV Cusp Separation: 2.3 cmLA   Dimension: 5.1 3.9 cm                    Dimension: 4.8 cmAO Root   cm             LV Volume Diastolic: 411  Dimension: 3.8 cmLA Area: 20.8   LV FS:23.5 %   ml                        cm^2   LV PW          LV Volume Systolic: 61.3   Diastolic: 1.4 ml   cm             LV EDV/LV EDV Index: 124   Septum         ml/45 m^2LV ESV/LV ESV    RV Diastolic Dimension: 3.4 cm   Diastolic: 1.4 Index: 33.9 ml/24 m^2   cm             EF Calculated: 46.9 %     LA/Aorta: 1.26                                            Ascending Aorta: 3.6 cm                                            LA volume/Index: 51.9 ml /19m^2     Doppler Measurements & Calculations      MV Peak E-Wave: 89.7  AV Peak Velocity: 155 LVOT Peak Velocity: 109 cm/s   cm/s                  cm/s                  LVOT Mean Velocity: 71.6 cm/s   MV Peak A-Wave: 81.9  AV Peak Gradient:     LVOT Peak Gradient: 5 mmHgLVOT   cm/s                  9.61 mmHg             Mean Gradient: 2 mmHg   MV E/A Ratio: 1.1 AV Mean Velocity:   MV Peak Gradient:     91.2 cm/s             TV Peak E-Wave: 29.8 cm/s   3.22 mmHg             AV Mean Gradient: 4   TV Peak A-Wave: 42.7 cm/s                         mmHg   MV Deceleration Time: AV VTI: 28.5 cm       TV Peak Gradient: 0.36 mmHg   268 msec   MV P1/2t: 78 msec                           PV Peak Velocity: 69.7 cm/s   MVA by PHT:2.82 cm^2  LVOT VTI: 24.8 cm     PV Peak Gradient: 1.94 mmHg                         IVRT: 95 msec   MV E' Septal   Velocity: 5.5 cm/s   MV A' Septal          AV DVI (VTI): 0.87AV   Velocity: 7.9 cm/s    DVI (Vmax):0.7   MV E' Lateral   Velocity: 9.4 cm/s   MV A' Lateral   Velocity: 11.3 cm/s   E/E' septal: 16.31   E/E' lateral: 9.54   MR Velocity: 373 cm/s     [email or web address] [id number]       STRESS:    CATH:    Assessment/Plan       Diagnosis Orders   1. Dilated cardiomyopathy (HCC)       Chronic CHF exacerbation, s/p CardioMEMs insertion on 6/16/21  Cardiomyopathy, EF improved to 55-60%  Morbid Obesity  LAURIE on BiPAP  DM  HTN-mildly uncontrolled  CKD--3.7     Has been having diarrhea  BP uncontrolled likely due to exertion   Dry weight 333 lbs  Continue DAPT  On  bumex / aldactone  toleratign well, no side effects  Has CHF clinic follow up  Advised to use the cardiomems pillow regularly  Might need BIPAP therapy  Continue Bumex for now  Has been recently placed on supplemental O2  Needs weight loss      Return in about 6 months (around 1/7/2023), or if symptoms worsen or fail to improve, for Review testing, Regular follow up.        Electronically signed by Malissa Murray MD [facility]  7/7/2022 at 1:12 PM

## 2022-07-08 LAB
ANION GAP SERPL CALCULATED.3IONS-SCNC: 12 MEQ/L (ref 8–16)
BUN BLDV-MCNC: 46 MG/DL (ref 7–22)
CALCIUM SERPL-MCNC: 9.3 MG/DL (ref 8.5–10.5)
CHLORIDE BLD-SCNC: 104 MEQ/L (ref 98–111)
CO2: 25 MEQ/L (ref 23–33)
CREAT SERPL-MCNC: 3.4 MG/DL (ref 0.4–1.2)
GFR SERPL CREATININE-BSD FRML MDRD: 19 ML/MIN/1.73M2
GLUCOSE BLD-MCNC: 142 MG/DL (ref 70–108)
POTASSIUM SERPL-SCNC: 4.5 MEQ/L (ref 3.5–5.2)
SODIUM BLD-SCNC: 141 MEQ/L (ref 135–145)

## 2022-07-11 NOTE — TELEPHONE ENCOUNTER
Left message to call office to review labs - creat still elevated. Check if he's been taking diuretics - were put on hold last week.

## 2022-07-12 NOTE — TELEPHONE ENCOUNTER
Spoke to patient did stop the diuretics, c/o sob , when up moving, not getting daily weight, not upstairs for 2 weeks, last week b/p 180/102. Not sleeping well up all night. Rosalie advise?

## 2022-07-12 NOTE — TELEPHONE ENCOUNTER
Called pt back - no answer. Will attempt tomorrow - would recommend he continue off diuretics. Ask if he could get weight, even better send CardioMEMs so we have some idea of fluid.

## 2022-07-15 NOTE — TELEPHONE ENCOUNTER
Spoke w/ pt - he denies wt gain, swelling, increased SOB. Continues w/ AKINS - wearing O2. Continues very fatigued and ill feeling. He feels all GI related - recently saw Dr Emmette Koyanagi and had stool testing, awaiting results. Instructed to call office to f/u on results. Labs reviewed - creat slightly better (down 3.4 from 3.7) but still up considerably from his baseline 6 months ago. He confirms he is not taking any diuretics (off Bumex, Aldactone and Metolazone). Not consistent at sending CardioMems - recent reading of 39 mmHg up from 31 mmHg week ago. Pt instructed to take Bumex PRN for wt gain, swelling, etc.  Voices understanding. Instructed pt if continues ill feeling or worsening s/s needs to come to ED.

## 2022-07-19 ENCOUNTER — APPOINTMENT (OUTPATIENT)
Dept: GENERAL RADIOLOGY | Age: 62
DRG: 291 | End: 2022-07-19
Payer: MEDICARE

## 2022-07-19 ENCOUNTER — HOSPITAL ENCOUNTER (INPATIENT)
Age: 62
LOS: 2 days | Discharge: HOME | DRG: 291 | End: 2022-07-21
Attending: EMERGENCY MEDICINE | Admitting: INTERNAL MEDICINE
Payer: MEDICARE

## 2022-07-19 DIAGNOSIS — E83.42 HYPOMAGNESEMIA: ICD-10-CM

## 2022-07-19 DIAGNOSIS — I50.9 ACUTE ON CHRONIC CONGESTIVE HEART FAILURE, UNSPECIFIED HEART FAILURE TYPE (HCC): Primary | ICD-10-CM

## 2022-07-19 DIAGNOSIS — E87.6 HYPOKALEMIA: ICD-10-CM

## 2022-07-19 DIAGNOSIS — N17.9 ACUTE RENAL FAILURE SUPERIMPOSED ON CHRONIC KIDNEY DISEASE, UNSPECIFIED CKD STAGE, UNSPECIFIED ACUTE RENAL FAILURE TYPE (HCC): ICD-10-CM

## 2022-07-19 DIAGNOSIS — N18.9 ACUTE RENAL FAILURE SUPERIMPOSED ON CHRONIC KIDNEY DISEASE, UNSPECIFIED CKD STAGE, UNSPECIFIED ACUTE RENAL FAILURE TYPE (HCC): ICD-10-CM

## 2022-07-19 DIAGNOSIS — G89.4 CHRONIC PAIN SYNDROME: ICD-10-CM

## 2022-07-19 DIAGNOSIS — D64.9 ANEMIA, UNSPECIFIED TYPE: ICD-10-CM

## 2022-07-19 DIAGNOSIS — R77.8 ELEVATED TROPONIN: ICD-10-CM

## 2022-07-19 PROBLEM — I50.43: Status: ACTIVE | Noted: 2022-07-19

## 2022-07-19 PROBLEM — I50.43 CHF (CONGESTIVE HEART FAILURE), NYHA CLASS III, ACUTE ON CHRONIC, COMBINED (HCC): Status: ACTIVE | Noted: 2022-07-19

## 2022-07-19 LAB
ALBUMIN SERPL-MCNC: 3.3 G/DL (ref 3.5–5.1)
ALP BLD-CCNC: 86 U/L (ref 38–126)
ALT SERPL-CCNC: 15 U/L (ref 11–66)
ANION GAP SERPL CALCULATED.3IONS-SCNC: 13 MEQ/L (ref 8–16)
AST SERPL-CCNC: 13 U/L (ref 5–40)
BASOPHILS # BLD: 0.6 %
BASOPHILS ABSOLUTE: 0 THOU/MM3 (ref 0–0.1)
BILIRUB SERPL-MCNC: 0.5 MG/DL (ref 0.3–1.2)
BUN BLDV-MCNC: 35 MG/DL (ref 7–22)
CALCIUM SERPL-MCNC: 8.8 MG/DL (ref 8.5–10.5)
CHLORIDE BLD-SCNC: 106 MEQ/L (ref 98–111)
CO2: 22 MEQ/L (ref 23–33)
CREAT SERPL-MCNC: 3.3 MG/DL (ref 0.4–1.2)
EKG ATRIAL RATE: 85 BPM
EKG P AXIS: 40 DEGREES
EKG P-R INTERVAL: 172 MS
EKG Q-T INTERVAL: 388 MS
EKG QRS DURATION: 106 MS
EKG QTC CALCULATION (BAZETT): 461 MS
EKG R AXIS: 11 DEGREES
EKG T AXIS: 38 DEGREES
EKG VENTRICULAR RATE: 85 BPM
EOSINOPHIL # BLD: 5.7 %
EOSINOPHILS ABSOLUTE: 0.4 THOU/MM3 (ref 0–0.4)
ERYTHROCYTE [DISTWIDTH] IN BLOOD BY AUTOMATED COUNT: 17.8 % (ref 11.5–14.5)
ERYTHROCYTE [DISTWIDTH] IN BLOOD BY AUTOMATED COUNT: 52.4 FL (ref 35–45)
GFR SERPL CREATININE-BSD FRML MDRD: 19 ML/MIN/1.73M2
GLUCOSE BLD-MCNC: 105 MG/DL (ref 70–108)
HCT VFR BLD CALC: 24.9 % (ref 42–52)
HEMOGLOBIN: 7.7 GM/DL (ref 14–18)
IMMATURE GRANS (ABS): 0.04 THOU/MM3 (ref 0–0.07)
IMMATURE GRANULOCYTES: 0.6 %
LYMPHOCYTES # BLD: 7.1 %
LYMPHOCYTES ABSOLUTE: 0.5 THOU/MM3 (ref 1–4.8)
MAGNESIUM: 1.5 MG/DL (ref 1.6–2.4)
MCH RBC QN AUTO: 25.5 PG (ref 26–33)
MCHC RBC AUTO-ENTMCNC: 30.9 GM/DL (ref 32.2–35.5)
MCV RBC AUTO: 82.5 FL (ref 80–94)
MONOCYTES # BLD: 8.3 %
MONOCYTES ABSOLUTE: 0.6 THOU/MM3 (ref 0.4–1.3)
NUCLEATED RED BLOOD CELLS: 0 /100 WBC
OSMOLALITY CALCULATION: 289.6 MOSMOL/KG (ref 275–300)
PLATELET # BLD: 247 THOU/MM3 (ref 130–400)
PMV BLD AUTO: 8.4 FL (ref 9.4–12.4)
POTASSIUM REFLEX MAGNESIUM: 3.4 MEQ/L (ref 3.5–5.2)
PRO-BNP: 8444 PG/ML (ref 0–900)
RBC # BLD: 3.02 MILL/MM3 (ref 4.7–6.1)
SEG NEUTROPHILS: 77.7 %
SEGMENTED NEUTROPHILS ABSOLUTE COUNT: 5.2 THOU/MM3 (ref 1.8–7.7)
SODIUM BLD-SCNC: 141 MEQ/L (ref 135–145)
TOTAL PROTEIN: 7.7 G/DL (ref 6.1–8)
TROPONIN T: 0.03 NG/ML
WBC # BLD: 6.7 THOU/MM3 (ref 4.8–10.8)

## 2022-07-19 PROCEDURE — 80053 COMPREHEN METABOLIC PANEL: CPT

## 2022-07-19 PROCEDURE — 93005 ELECTROCARDIOGRAM TRACING: CPT | Performed by: NURSE PRACTITIONER

## 2022-07-19 PROCEDURE — 85025 COMPLETE CBC W/AUTO DIFF WBC: CPT

## 2022-07-19 PROCEDURE — 6370000000 HC RX 637 (ALT 250 FOR IP): Performed by: NURSE PRACTITIONER

## 2022-07-19 PROCEDURE — 6360000002 HC RX W HCPCS: Performed by: NURSE PRACTITIONER

## 2022-07-19 PROCEDURE — 93010 ELECTROCARDIOGRAM REPORT: CPT | Performed by: INTERNAL MEDICINE

## 2022-07-19 PROCEDURE — 99285 EMERGENCY DEPT VISIT HI MDM: CPT

## 2022-07-19 PROCEDURE — 2580000003 HC RX 258

## 2022-07-19 PROCEDURE — 84484 ASSAY OF TROPONIN QUANT: CPT

## 2022-07-19 PROCEDURE — 71045 X-RAY EXAM CHEST 1 VIEW: CPT

## 2022-07-19 PROCEDURE — 1200000003 HC TELEMETRY R&B

## 2022-07-19 PROCEDURE — 96374 THER/PROPH/DIAG INJ IV PUSH: CPT

## 2022-07-19 PROCEDURE — 83735 ASSAY OF MAGNESIUM: CPT

## 2022-07-19 PROCEDURE — 99223 1ST HOSP IP/OBS HIGH 75: CPT | Performed by: INTERNAL MEDICINE

## 2022-07-19 PROCEDURE — 6370000000 HC RX 637 (ALT 250 FOR IP): Performed by: STUDENT IN AN ORGANIZED HEALTH CARE EDUCATION/TRAINING PROGRAM

## 2022-07-19 PROCEDURE — 83880 ASSAY OF NATRIURETIC PEPTIDE: CPT

## 2022-07-19 PROCEDURE — 6360000002 HC RX W HCPCS

## 2022-07-19 RX ORDER — POLYETHYLENE GLYCOL 3350 17 G/17G
17 POWDER, FOR SOLUTION ORAL DAILY PRN
Status: DISCONTINUED | OUTPATIENT
Start: 2022-07-19 | End: 2022-07-21 | Stop reason: HOSPADM

## 2022-07-19 RX ORDER — GABAPENTIN 300 MG/1
300 CAPSULE ORAL NIGHTLY
Qty: 30 CAPSULE | Refills: 0 | Status: SHIPPED | OUTPATIENT
Start: 2022-07-22 | End: 2022-08-22

## 2022-07-19 RX ORDER — MAGNESIUM SULFATE IN WATER 40 MG/ML
2000 INJECTION, SOLUTION INTRAVENOUS ONCE
Status: DISCONTINUED | OUTPATIENT
Start: 2022-07-19 | End: 2022-07-21 | Stop reason: HOSPADM

## 2022-07-19 RX ORDER — HYDRALAZINE HYDROCHLORIDE 25 MG/1
25 TABLET, FILM COATED ORAL EVERY 8 HOURS SCHEDULED
Status: DISCONTINUED | OUTPATIENT
Start: 2022-07-19 | End: 2022-07-20

## 2022-07-19 RX ORDER — ENOXAPARIN SODIUM 100 MG/ML
30 INJECTION SUBCUTANEOUS EVERY 12 HOURS
Status: DISCONTINUED | OUTPATIENT
Start: 2022-07-19 | End: 2022-07-21

## 2022-07-19 RX ORDER — DOXEPIN HYDROCHLORIDE 25 MG/1
50 CAPSULE ORAL NIGHTLY
Status: DISCONTINUED | OUTPATIENT
Start: 2022-07-19 | End: 2022-07-21 | Stop reason: HOSPADM

## 2022-07-19 RX ORDER — ROPINIROLE 1 MG/1
1 TABLET, FILM COATED ORAL NIGHTLY
Status: DISCONTINUED | OUTPATIENT
Start: 2022-07-19 | End: 2022-07-21 | Stop reason: HOSPADM

## 2022-07-19 RX ORDER — POTASSIUM CHLORIDE 7.45 MG/ML
10 INJECTION INTRAVENOUS PRN
Status: DISCONTINUED | OUTPATIENT
Start: 2022-07-19 | End: 2022-07-21 | Stop reason: HOSPADM

## 2022-07-19 RX ORDER — ONDANSETRON 2 MG/ML
4 INJECTION INTRAMUSCULAR; INTRAVENOUS EVERY 6 HOURS PRN
Status: DISCONTINUED | OUTPATIENT
Start: 2022-07-19 | End: 2022-07-21 | Stop reason: HOSPADM

## 2022-07-19 RX ORDER — SODIUM CHLORIDE 0.9 % (FLUSH) 0.9 %
5-40 SYRINGE (ML) INJECTION EVERY 12 HOURS SCHEDULED
Status: DISCONTINUED | OUTPATIENT
Start: 2022-07-19 | End: 2022-07-21 | Stop reason: HOSPADM

## 2022-07-19 RX ORDER — AMLODIPINE BESYLATE 10 MG/1
10 TABLET ORAL DAILY
Status: DISCONTINUED | OUTPATIENT
Start: 2022-07-19 | End: 2022-07-20

## 2022-07-19 RX ORDER — POTASSIUM CHLORIDE 20 MEQ/1
40 TABLET, EXTENDED RELEASE ORAL PRN
Status: DISCONTINUED | OUTPATIENT
Start: 2022-07-19 | End: 2022-07-21 | Stop reason: HOSPADM

## 2022-07-19 RX ORDER — ONDANSETRON 4 MG/1
4 TABLET, ORALLY DISINTEGRATING ORAL EVERY 8 HOURS PRN
Status: DISCONTINUED | OUTPATIENT
Start: 2022-07-19 | End: 2022-07-21 | Stop reason: HOSPADM

## 2022-07-19 RX ORDER — AMITRIPTYLINE HYDROCHLORIDE 10 MG/1
10 TABLET, FILM COATED ORAL 2 TIMES DAILY
Status: DISCONTINUED | OUTPATIENT
Start: 2022-07-19 | End: 2022-07-21 | Stop reason: HOSPADM

## 2022-07-19 RX ORDER — CARVEDILOL 25 MG/1
50 TABLET ORAL 2 TIMES DAILY
Status: DISCONTINUED | OUTPATIENT
Start: 2022-07-19 | End: 2022-07-21 | Stop reason: HOSPADM

## 2022-07-19 RX ORDER — ACETAMINOPHEN 650 MG/1
650 SUPPOSITORY RECTAL EVERY 6 HOURS PRN
Status: DISCONTINUED | OUTPATIENT
Start: 2022-07-19 | End: 2022-07-21 | Stop reason: HOSPADM

## 2022-07-19 RX ORDER — OXYCODONE AND ACETAMINOPHEN 10; 325 MG/1; MG/1
1 TABLET ORAL EVERY 8 HOURS PRN
Status: DISCONTINUED | OUTPATIENT
Start: 2022-07-19 | End: 2022-07-21 | Stop reason: HOSPADM

## 2022-07-19 RX ORDER — LANOLIN ALCOHOL/MO/W.PET/CERES
4.5 CREAM (GRAM) TOPICAL NIGHTLY PRN
Status: DISCONTINUED | OUTPATIENT
Start: 2022-07-19 | End: 2022-07-21 | Stop reason: HOSPADM

## 2022-07-19 RX ORDER — OXYCODONE AND ACETAMINOPHEN 10; 325 MG/1; MG/1
1 TABLET ORAL EVERY 8 HOURS PRN
Qty: 90 TABLET | Refills: 0 | Status: SHIPPED | OUTPATIENT
Start: 2022-07-21 | End: 2022-08-30 | Stop reason: SDUPTHER

## 2022-07-19 RX ORDER — POTASSIUM CHLORIDE 20 MEQ/1
40 TABLET, EXTENDED RELEASE ORAL ONCE
Status: COMPLETED | OUTPATIENT
Start: 2022-07-19 | End: 2022-07-19

## 2022-07-19 RX ORDER — SODIUM CHLORIDE 9 MG/ML
INJECTION, SOLUTION INTRAVENOUS PRN
Status: DISCONTINUED | OUTPATIENT
Start: 2022-07-19 | End: 2022-07-21 | Stop reason: HOSPADM

## 2022-07-19 RX ORDER — TAMSULOSIN HYDROCHLORIDE 0.4 MG/1
0.4 CAPSULE ORAL NIGHTLY
Status: DISCONTINUED | OUTPATIENT
Start: 2022-07-19 | End: 2022-07-21 | Stop reason: HOSPADM

## 2022-07-19 RX ORDER — FUROSEMIDE 10 MG/ML
40 INJECTION INTRAMUSCULAR; INTRAVENOUS ONCE
Status: COMPLETED | OUTPATIENT
Start: 2022-07-19 | End: 2022-07-19

## 2022-07-19 RX ORDER — ASPIRIN 81 MG/1
81 TABLET ORAL DAILY
Status: DISCONTINUED | OUTPATIENT
Start: 2022-07-19 | End: 2022-07-21 | Stop reason: HOSPADM

## 2022-07-19 RX ORDER — ACETAMINOPHEN 325 MG/1
650 TABLET ORAL EVERY 6 HOURS PRN
Status: DISCONTINUED | OUTPATIENT
Start: 2022-07-19 | End: 2022-07-21 | Stop reason: HOSPADM

## 2022-07-19 RX ORDER — SODIUM CHLORIDE 0.9 % (FLUSH) 0.9 %
5-40 SYRINGE (ML) INJECTION PRN
Status: DISCONTINUED | OUTPATIENT
Start: 2022-07-19 | End: 2022-07-21 | Stop reason: HOSPADM

## 2022-07-19 RX ADMIN — FUROSEMIDE 40 MG: 10 INJECTION, SOLUTION INTRAMUSCULAR; INTRAVENOUS at 13:18

## 2022-07-19 RX ADMIN — FUROSEMIDE 40 MG: 10 INJECTION, SOLUTION INTRAMUSCULAR; INTRAVENOUS at 21:35

## 2022-07-19 RX ADMIN — SODIUM CHLORIDE, PRESERVATIVE FREE 10 ML: 5 INJECTION INTRAVENOUS at 21:36

## 2022-07-19 RX ADMIN — TAMSULOSIN HYDROCHLORIDE 0.4 MG: 0.4 CAPSULE ORAL at 21:35

## 2022-07-19 RX ADMIN — ASPIRIN 81 MG: 81 TABLET, COATED ORAL at 21:35

## 2022-07-19 RX ADMIN — ENOXAPARIN SODIUM 30 MG: 100 INJECTION SUBCUTANEOUS at 21:35

## 2022-07-19 RX ADMIN — POTASSIUM CHLORIDE 40 MEQ: 1500 TABLET, EXTENDED RELEASE ORAL at 14:07

## 2022-07-19 RX ADMIN — Medication 4.5 MG: at 21:35

## 2022-07-19 ASSESSMENT — ENCOUNTER SYMPTOMS
DIARRHEA: 1
SHORTNESS OF BREATH: 1
SORE THROAT: 0
ABDOMINAL PAIN: 0
EYE PAIN: 0
RHINORRHEA: 0
ABDOMINAL DISTENTION: 0
COUGH: 0
NAUSEA: 0
EYE DISCHARGE: 0
WHEEZING: 0
VOMITING: 0

## 2022-07-19 ASSESSMENT — PAIN - FUNCTIONAL ASSESSMENT: PAIN_FUNCTIONAL_ASSESSMENT: NONE - DENIES PAIN

## 2022-07-19 NOTE — ED NOTES
ED to inpatient nurses report    Chief Complaint   Patient presents with    Shortness of Breath    Leg Swelling     Bilateral      Bloated      Present to ED from home  LOC: alert and orientated to name, place, date  Vital signs   Vitals:    07/19/22 1246 07/19/22 1331 07/19/22 1440 07/19/22 1546   BP: 136/76 139/68 (!) 178/84 139/71   Pulse: 67 69 69 66   Resp: 17 15 16 23   Temp:       TempSrc:       SpO2: 95% 93% 95% 94%   Weight:       Height:          Oxygen Baseline 3L O2 NC    Current needs required 4L O2 NC  LDAs:   Peripheral IV 07/19/22 Left Arm (Active)   Site Assessment Clean, dry & intact 07/19/22 1041   Line Status Blood return noted;Specimen collected;Normal saline locked 07/19/22 1041     Mobility: Independent  Pending ED orders: None  Present condition: Stable    C-SSRS Risk of Suicide: No Risk  Swallow Screening  Pass    Electronically signed by Lieutenant Kiko RN on 7/19/2022 at 4:01 PM       Lieutenant Kiko RN  07/19/22 3339

## 2022-07-19 NOTE — ED PROVIDER NOTES
Genitourinary:  Negative for difficulty urinating, flank pain and frequency. Musculoskeletal:  Negative for arthralgias. Skin:  Negative for rash. Neurological:  Negative for dizziness, tremors, syncope, weakness and numbness. PAST MEDICAL AND SURGICAL HISTORY     Past Medical History:   Diagnosis Date    Arthritis     Back problem     back pain-sees [facility] pain mgmt    Bladder disease     Dr. Renell Osler    CHF with unknown LVEF ([facility] [place] [address] 05/24/2021    Dr. George/CHF clinic    Constipation     Diabetes mellitus ([facility] [address]     Dr. Ivan Cárdenas    Hypertension     Hypertensive emergency 4/11/2019    Hypertensive urgency 4/13/2019    Sleep apnea     has bipap-sees AZRA Olmedo CNP    Thyroid disease      Past Surgical History:   Procedure Laterality Date    ABDOMEN SURGERY      ABDOMEN SURGERY N/A 3/25/2022    ABDOMINAL LYSIS OF ADHESIONS, EXPLANT OF INFECTED MESH performed by Edinson Wayne MD at [address]      no stents    CARPAL TUNNEL RELEASE Bilateral     COLONOSCOPY  2017    Dr. Diomedes Mauricio, [address]      x3 surgeries with 14 repairs    KNEE SURGERY Right 1980's    cartilage    NE EXPLORATORY OF ABDOMEN N/A 2/5/2018    ABDOMINAL EXPLORATION WITH LYSIS OF COMPLICATED ADHESIONS WITH AN EXTENDED RIGHT COLON RESECTION performed by Edinson Wayne MD at [address]   No current facility-administered medications for this encounter. Current Outpatient Medications:     gabapentin (NEURONTIN) 300 MG capsule, Take 1 capsule by mouth nightly for 30 days. , Disp: 30 capsule, Rfl: 0    doxepin (SINEQUAN) 25 MG capsule, take 2 capsules by mouth at bedtime, Disp: 60 capsule, Rfl: 3    CPAP Machine MISC, by Does not apply route Please change bipap to 18/14 cm H20., Disp: 1 each, Rfl: 0    carvedilol (COREG) 25 MG tablet, Take 2 tablets by mouth 2 times daily, Disp: 120 tablet, Rfl: 5    RA MELATONIN 3-2 MG TABS, TAKE 1 AND 1/2 TABLETS BY MOUTH NIGHTLY AS NEEDED FOR SLEEP, Disp: , Rfl:     oxybutynin (DITROPAN-XL) 5 MG extended release tablet, Take 1 tablet by mouth at bedtime, Disp: 30 tablet, Rfl: 0    amitriptyline (ELAVIL) 10 MG tablet, Take 10 mg by mouth 2 times daily, Disp: , Rfl:     bumetanide (BUMEX) 1 MG tablet, Take 2 tablets by mouth daily AND 1 tablet as needed.  (Patient taking differently: Take 1 mg by mouth in the morning.), Disp: 60 tablet, Rfl: 6    potassium chloride (K-TAB) 10 MEQ extended release tablet, Take 1 tablet by mouth daily, Disp: 60 tablet, Rfl: 3    tamsulosin (FLOMAX) 0.4 MG capsule, Take 1 capsule by mouth nightly, Disp: 90 capsule, Rfl: 3    hydrALAZINE (APRESOLINE) 25 MG tablet, Take 1 tablet by mouth every 8 hours, Disp: 90 tablet, Rfl: 3    levothyroxine (SYNTHROID) 175 MCG tablet, Take 1 tablet by mouth Daily, Disp: 30 tablet, Rfl: 3    amLODIPine (NORVASC) 10 MG tablet, Take 1 tablet by mouth daily, Disp: 30 tablet, Rfl: 3    melatonin 3 MG TABS tablet, Take 1.5 tablets by mouth nightly as needed (Sleep), Disp: 45 tablet, Rfl: 3    ipratropium-albuterol (DUONEB) 0.5-2.5 (3) MG/3ML SOLN nebulizer solution, Inhale 3 mLs into the lungs every 4 hours as needed for Shortness of Breath (Patient taking differently: Inhale 1 vial into the lungs every 4 hours as needed for Shortness of Breath Usually uses at night), Disp: 360 mL, Rfl: 1    Fluticasone furoate-vilanterol (BREO ELLIPTA) 200-25 MCG/INH AEPB inhaler, Inhale 1 puff into the lungs daily, Disp: 3 each, Rfl: 3    montelukast (SINGULAIR) 10 MG tablet, Take 1 tablet by mouth nightly, Disp: 90 tablet, Rfl: 3    albuterol sulfate  (90 Base) MCG/ACT inhaler, Inhale 2 puffs into the lungs every 6 hours as needed for Wheezing or Shortness of Breath, Disp: 1 each, Rfl: 5    rOPINIRole (REQUIP) 1 MG tablet, Take 1 pill in afternoon and 2 at bedtime, Disp: 270 tablet, Rfl: 1    aspirin EC 81 MG EC tablet, Take 1 tablet by mouth daily, Disp: 90 tablet, Rfl: 1 nitroGLYCERIN (NITROSTAT) 0.4 MG SL tablet, Place 1 tablet under the tongue every 5 minutes as needed for Chest pain (up to 3 doses), Disp: 25 tablet, Rfl: 3    glimepiride (AMARYL) 4 MG tablet, Take 4 mg by mouth daily , Disp: , Rfl:     azelastine (ASTELIN) 137 MCG/SPRAY nasal spray, 1 spray by Nasal route as needed. Use in each nostril as directed, Disp: , Rfl:       SOCIAL HISTORY     Social History     Social History Narrative    Not on file     Social History     Tobacco Use    Smoking status: Former     Types: Pipe    Smokeless tobacco: Current     Types: Chew    Tobacco comments:     quit pipe over 30 yrs ago   Vaping Use    Vaping Use: Never used   Substance Use Topics    Alcohol use: No     Alcohol/week: 0.0 standard drinks     Comment: quit    Drug use: No         ALLERGIES     Allergies   Allergen Reactions    Shellfish-Derived Products Swelling     Tolerates IV dye without any problems      Pcn [Penicillins] Itching    Succinylcholine      Pseudocholinesterase Deficiency    Sulfa Antibiotics Itching    Morphine Nausea And Vomiting     \"head swimming, skin crawling\"    Tape [Adhesive Tape] Rash         FAMILY HISTORY     Family History   Problem Relation Age of Onset    Cancer Mother         breast    Heart Disease Father         bladder, lung    Cancer Father     Stroke Brother     Heart Attack Brother     Prostate Cancer Brother     Diabetes Paternal Grandmother     Arthritis Brother     High Blood Pressure Neg Hx          PREVIOUS RECORDS   Previous records reviewed: 5/2/2022 through current        PHYSICAL EXAM     ED Triage Vitals [07/19/22 1032]   BP Temp Temp Source Heart Rate Resp SpO2 Height Weight   (!) 167/92 98.5 °F (36.9 °C) Oral 90 23 95 % 6' 1\" (1.854 m) (!) 315 lb (142.9 kg)     Initial vital signs and nursing assessment reviewed and abnormal from hypertension . Body mass index is 41.56 kg/m². Pulsoximetry is abnormal per my interpretation.     Additional Vital Signs:  Vitals:    07/19/22 1246   BP: 136/76   Pulse: 67   Resp: 17   Temp:    SpO2: 95%       Physical Exam  Constitutional:       Appearance: Normal appearance. HENT:      Head: Normocephalic. Right Ear: External ear normal.      Left Ear: External ear normal.      Nose: Nose normal.      Mouth/Throat:      Mouth: Mucous membranes are moist.      Pharynx: Oropharynx is clear. Eyes:      Conjunctiva/sclera: Conjunctivae normal.      Pupils: Pupils are equal, round, and reactive to light. Cardiovascular:      Rate and Rhythm: Normal rate and regular rhythm. Pulses: Normal pulses. Heart sounds: Normal heart sounds. Pulmonary:      Effort: Tachypnea present. Comments: Requiring O2 at 5 L per nasal cannula to maintain pulse ox greater than 93%  Abdominal:      General: Bowel sounds are normal.      Palpations: Abdomen is soft. Comments: Midline abdominal scar   Musculoskeletal:         General: Normal range of motion. Cervical back: Normal range of motion and neck supple. Skin:     General: Skin is warm and dry. Capillary Refill: Capillary refill takes less than 2 seconds. Neurological:      General: No focal deficit present. Mental Status: He is alert. MEDICAL DECISION MAKING   Initial Assessment:   Patient is a 47-year-old male who presents the ED with complaint of shortness of breath. Patient has recently stopped diuretics 1 week ago due to concerns of worsening kidney disease. Patient differential diagnosis includes but is not limited to CHF exacerbation, acute on chronic kidney disease, dehydration, ACS, deconditioning. Plan:   Labs  EKG  Chest x-ray  Cardiac monitor while in the ED      Due to patient's increasing symptoms of shortness of breath, peripheral edema, and increasing oxygen needs patient will need to be further evaluated and treated at this time. Patient will need admission for management of both CHF and worsening of kidney disease.   Due to patient's increasing shortness of breath and tachypnea secondary to fluid overload patient will receive Lasix 40 mg at this time to improve worsening respiratory status. ED RESULTS   Laboratory results:  Labs Reviewed   CBC WITH AUTO DIFFERENTIAL - Abnormal; Notable for the following components:       Result Value    RBC 3.02 (*)     Hemoglobin 7.7 (*)     Hematocrit 24.9 (*)     MCH 25.5 (*)     MCHC 30.9 (*)     RDW-CV 17.8 (*)     RDW-SD 52.4 (*)     MPV 8.4 (*)     Lymphocytes Absolute 0.5 (*)     All other components within normal limits   COMPREHENSIVE METABOLIC PANEL W/ REFLEX TO MG FOR LOW K - Abnormal; Notable for the following components:    CREATININE 3.3 (*)     BUN 35 (*)     Potassium reflex Magnesium 3.4 (*)     CO2 22 (*)     Albumin 3.3 (*)     All other components within normal limits   TROPONIN - Abnormal; Notable for the following components:    Troponin T 0.028 (*)     All other components within normal limits   BRAIN NATRIURETIC PEPTIDE - Abnormal; Notable for the following components:    Pro-BNP 8444.0 (*)     All other components within normal limits   MAGNESIUM - Abnormal; Notable for the following components:    Magnesium 1.5 (*)     All other components within normal limits   GLOMERULAR FILTRATION RATE, ESTIMATED - Abnormal; Notable for the following components:    Est, Glom Filt Rate 19 (*)     All other components within normal limits   ANION GAP   OSMOLALITY   URINALYSIS WITH REFLEX TO CULTURE       Radiologic studies results:  XR CHEST PORTABLE   Final Result   1. Moderate hepatomegaly. A CARDIOMEMS device is present, projected over left hilar region. 2. Moderate interstitial pneumonia/edema involving both lungs relatively diffusely. Overall appearance slightly worse than prior. **This report has been created using voice recognition software. It may contain minor errors which are inherent in voice recognition technology. **      Final report electronically signed by Dr. Brittany Pleitez on 7/19/2022 11:04 AM          ED Medications administered this visit:   Medications   furosemide (LASIX) injection 40 mg (40 mg IntraVENous Given 7/19/22 1318)         ED COURSE     ED Course as of 07/19/22 1330 Tue Jul 19, 2022   1312 Pro-BNP(!): 8444.0 [CN]   1330 Creatinine(!!): 3.3 [CN]   1330 BUN,BUNPL(!): 35 [CN]   1330 Troponin T(!): 0.028 [CN]   1330 Magnesium(!): 1.5 [CN]   1330 Potassium(!): 3.4 [CN]      ED Course User Index  [CN] Cecille Gilman MD       Patient noted with elevated BNP which is consistent with congestive heart failure patient also noted with increasing BUN and creatinine secondary to acute on chronic renal failure. Patient's troponin is elevated at this time and noted with electrolyte abnormalities of hypomagnesia and hypokalemia. Was able to decrease O2 to 4.5 L at this time we will continue to titrate down as tolerated    MEDICATION CHANGES     New Prescriptions    No medications on file         FINAL DISPOSITION     Final diagnoses:   Acute on chronic congestive heart failure, unspecified heart failure type (HCC)   Acute renal failure superimposed on chronic kidney disease, unspecified CKD stage, unspecified acute renal failure type (HCC)   Elevated troponin   Hypokalemia   Hypomagnesemia     Condition: condition: fair  Dispo: Admit to telemetry      This transcription was electronically signed. Parts of this transcriptions may have been dictated by use of voice recognition software and electronically transcribed, and parts may have been transcribed with the assistance of an ED scribe. The transcription may contain errors not detected in proofreading. Please refer to my supervising physician's documentation if my documentation differs.     Electronically Signed: Cecille Gilman MD, 07/19/22, 1:30 PM        Cecille Gilman MD  Resident  07/19/22 4296

## 2022-07-19 NOTE — H&P
Internal Medicine Resident History and Physical          Patient: Akil Garcia  : 1960  MRN: [id number]     Acct: [de-identified]    PCP: Lennox Farnsworth DO  Date of Admission: 2022  Date of Service: Pt seen/examined on 22  and Admitted to Inpatient with expected LOS greater than two midnights due to medical therapy. Assessment and Plan:  Acute on chronic diastolic CHF exacerbation NYHA class 3 : Active  2/2 pt stopping diuretics 1 week ago due to worsening kidney function  Echo 2022 showed EF 55%  BNP 8444  CXR showed hepatomegaly and moderate pulmonary edema   Plan  Continue NC O2, now on 4.5L   Lasix 40 mg was given in ER. Repeat Lasix 40 mg tonight   Monitor daily I/O daily weight changes  Low Na diet and fluid restriction  No need to repeat echo as pt just had it 3 months ago    Acute on chronic hypoxic respiratory failure: active  2/2 CHF exacerbation  Chronically on 2L oxygen at home   Plan  Wean Oxygen as tolerated  Treat as above  CPAP at night    CKD stage 3a: Active  Pt has been voiding well - oliguric   Cr is 3.3 (near baseline)  If kidney function worsens, we will consult nephrology  Monitor with BMP    Elevated troponin: Active  No EKG changes  Likely type 2 demand ischemia    Hypokalemia: Active  Potassium 3.4  Likely due to diuretics and hypomagnesemia   Plan  Potassium chloride 40 mEq   Potassium replacement protocol     Hypomagnesia: Active  Magnesium 1.5  Likely due to diuretic use   Plan  Replete Magnesium     Hypertension: Chronic controlled   /92 on admission.  BP is now 139/71  Restart home medications     Type 2 Diabetes mellitus: chronic  Hgb A1C 2022 was 4.7  Glucose 114  Hold home Glimepiride   Plan  Diabetic diet with low-carb option   Low-dose SSI if needed     Anemia of chronic disease 2/2 chronic renal failure: chronic  Hgb 7.7 likely close to baseline   Check CBC  Plan  Monitor  Transfuse if Hbg < 7    Hypothyroidism: chronic  Restart Levothyroxine =======================================================================      Chief Complaint:  Shortness of breath    History Of Present Illness:  Tomi Moncada is a 63 y.o. male with PMHx of class 3 diastolic heart failure, CKD stage 3a, hypertension, T2DM, and anemia of chronic disease who presents to [facility] with shortness of breath and bilateral leg swelling. Pt has history of CHF and was taken off diuretics one week ago for worsening kidney function. This was done by his nephrologist (Dr. Christie Fernandez). Even since stopping diuretics, pt has felt increasing shortness of breath and bloating. He started sleeping in a recliner. He is normally on 2L oxygen NC at home but has had to increased it to 4L NC. Shortness of breath was very severe last night so patient decided to come to ER this morning. ED course: Vitals: T 98, RR 23, P 90, /92 Oxygen sat 95% on 4L NC. Given 40 mg Lasix. Given 40 mEq Potassium. Hospitalist team was consulted for admit. Past Medical History:        Diagnosis Date    Arthritis     Back problem     back pain-sees [facility] pain mgmt    Bladder disease     Dr. Michelle Mixon    CHF with unknown LVEF ([address] 05/24/2021    Dr. George/CHF clinic    Constipation     Diabetes mellitus ([address]     Dr. Christie Fernandez    Hypertension     Hypertensive emergency 4/11/2019    Hypertensive urgency 4/13/2019    Sleep apnea     has bipap-sees AZRA Olmedo CNP    Thyroid disease        Past Surgical History:        Procedure Laterality Date    ABDOMEN SURGERY      ABDOMEN SURGERY N/A 3/25/2022    ABDOMINAL LYSIS OF ADHESIONS, EXPLANT OF INFECTED MESH performed by Tawana Jo MD at [address]      no stents    CARPAL TUNNEL RELEASE Bilateral     COLONOSCOPY  2017    Dr. Yvonne Quiles, [address]      x3 surgeries with 14 repairs    KNEE SURGERY Right 1980's    cartilage    [place] EXPLORATORY OF ABDOMEN N/A 2/5/2018    ABDOMINAL EXPLORATION WITH LYSIS OF COMPLICATED ADHESIONS WITH AN EXTENDED RIGHT COLON RESECTION performed by Femi Jacques MD at [facility]       Medications Prior to Admission:   Prior to Admission medications    Medication Sig Start Date End Date Taking? Authorizing Provider   gabapentin (NEURONTIN) 300 MG capsule Take 1 capsule by mouth nightly for 30 days. 6/24/22 7/24/22  SAHIL Romero CNP   doxepin (SINEQUAN) 25 MG capsule take 2 capsules by mouth at bedtime 6/13/22   Nii Zamora PA-C   CPAP Machine MISC by Does not apply route Please change bipap to 18/14 cm H20. 5/31/22   Nii Zamora PA-C   carvedilol (COREG) 25 MG tablet Take 2 tablets by mouth 2 times daily 5/24/22   SAHIL Boswell CNP   RA MELATONIN 3-2 MG TABS TAKE 1 AND 1/2 TABLETS BY MOUTH NIGHTLY AS NEEDED FOR SLEEP 4/22/22   Historical Provider, MD   oxybutynin (DITROPAN-XL) 5 MG extended release tablet Take 1 tablet by mouth at bedtime 4/24/22   Teddy Hemphill,    amitriptyline (ELAVIL) 10 MG tablet Take 10 mg by mouth 2 times daily    Historical Provider, MD   bumetanide (BUMEX) 1 MG tablet Take 2 tablets by mouth daily AND 1 tablet as needed. Patient taking differently: Take 1 mg by mouth in the morning.  4/14/22   SAHIL Perez CNP   potassium chloride (K-TAB) 10 MEQ extended release tablet Take 1 tablet by mouth daily 4/14/22   SAHIL Perez CNP   tamsulosin [facility]) 0.4 MG capsule Take 1 capsule by mouth nightly 4/12/22 4/12/23  Omer Conn MD   hydrALAZINE (APRESOLINE) 25 MG tablet Take 1 tablet by mouth every 8 hours 3/30/22   Keysha Kaurkeeper,    levothyroxine (SYNTHROID) 175 MCG tablet Take 1 tablet by mouth Daily 3/31/22   Janel Varags,    amLODIPine (NORVASC) 10 MG tablet Take 1 tablet by mouth daily 3/31/22   Janel Vargas DO   melatonin 3 MG TABS tablet Take 1.5 tablets by mouth nightly as needed (Sleep) 3/30/22 7/28/22  Janel Vargas DO   ipratropium-albuterol (DUONEB) 0.5-2.5 (3) MG/3ML SOLN nebulizer Paternal Grandmother     Arthritis Brother     High Blood Pressure Neg Hx        Review of Systems:   Pertinent positives and negatives as noted in the HPI. Otherwise complete ROS negative. Physical Exam:    /76   Pulse 67   Temp 98.5 °F (36.9 °C) (Oral)   Resp 17   Ht 6' 1\" (1.854 m)   Wt (!) 315 lb (142.9 kg)   SpO2 95%   BMI 41.56 kg/m²       General appearance: No apparent distress, fatigue  Eyes:  Pupils equal, round, and reactive to light. Conjunctivae/corneas clear. HENT: Head normal in appearance. External nares normal.  Oral mucosa moist without lesions. Hearing grossly intact. Neck: Supple, with full range of motion. Trachea midline. No gross JVD appreciated. Respiratory:  Normal respiratory effort. Clear to auscultation, bilaterally without rales or wheezes or rhonchi. Cardiovascular: Normal rate, regular rhythm with normal S1/S2 without murmurs. 2+ lower extremity edema  Abdomen: Soft, non-tender, non-distended with normal bowel sounds. Musculoskeletal: No joint swelling or tenderness. Normal tone. No abnormal movements. Skin: Warm and dry. No rashes or lesions. Neurologic:  No focal sensory/motor deficits in the upper and lower extremities. Cranial nerves:  grossly non-focal 2-12. Psychiatric: Alert and oriented, normal insight and thought content. Capillary Refill: Brisk,< 3 seconds. Peripheral Pulses: +2 palpable, equal bilaterally. Labs:     Recent Labs     07/19/22  1040   WBC 6.7   HGB 7.7*   HCT 24.9*        Recent Labs     07/19/22  1040      K 3.4*      CO2 22*   BUN 35*   CREATININE 3.3*   CALCIUM 8.8     Recent Labs     07/19/22  1040   AST 13   ALT 15   BILITOT 0.5   ALKPHOS 86     No results for input(s): INR in the last 72 hours. No results for input(s): Haylie Fuchs in the last 72 hours.   Lab Results   Component Value Date/Time    NITRU NEGATIVE 03/23/2022 04:45 AM    WBCUA 5-9 03/23/2022 04:45 AM    BACTERIA NONE SEEN

## 2022-07-19 NOTE — ED PROVIDER NOTES
[facility] Renal Telemetry 6K      CHIEF COMPLAINT       Chief Complaint   Patient presents with    Shortness of Breath    Leg Swelling     Bilateral      Bloated       Nurses Notes reviewed and I agree except as noted in the HPI. HISTORY OF PRESENT ILLNESS    Meek Domínguez is a 63 y.o. male who presents with complaint of shortness of breath and bilateral leg swelling. Patient said that he feels bloated, he is also gaining weight. He has history of CHF, is on diuretics but he was taken off his diuretics by CHF clinic due to worsening kidney function. Patient denies any fever or chills, no cough. Onset: Acute  Duration: Approximately 5 days  Timing: Persistent  Location of Pain: No focal pain  Intesity/severity: Moderate shortness of breath with exertion  Modifying Factors: Exertion  Relieved by;  Previous Episodes; Tx Before arrival: Held diuretics for approximately 1 week  REVIEW OF SYSTEMS      Review of Systems   Constitutional: Negative for fever, chills, diaphoresis and fatigue. HENT: Negative for congestion, drooling, facial swelling and sore throat. Eyes: Negative for photophobia, pain and discharge. Respiratory: Negative for shortness of breath; negative for wheezing and stridor. Cardiovascular: Negative for chest pain, palpitations; positive for leg swelling. Gastrointestinal: Negative for abdominal pain, blood in stool and abdominal distention. Endocrine: Negative for cold intolerance, heat intolerance, polydipsia and polyuria. Genitourinary: Negative for dysuria, urgency, hematuria and difficulty urinating. Musculoskeletal: Negative for gait problem, neck pain and neck stiffness. Skin; No rash, No itching  Neurological: Negative for seizures, weakness and numbness. Hematological: Negative for adenopathy. Does not bruise/bleed easily. Psychiatric/Behavioral: Negative for hallucinations, confusion and agitation.      PAST MEDICAL HISTORY    has a past medical history of Arthritis, Back problem, Bladder disease, CHF with unknown LVEF ([facility] [address] Constipation, Diabetes mellitus ([facility] [address] Hypertension, Hypertensive emergency, Hypertensive urgency, Sleep apnea, and Thyroid disease. SURGICAL HISTORY      has a past surgical history that includes Appendectomy; knee surgery (Right, 1980's); Carpal tunnel release (Bilateral); Colonoscopy (2017); hernia repair; pr exploratory of abdomen (N/A, 2/5/2018); Dilatation, esophagus; Abdomen surgery; Cardiac catheterization; and Abdomen surgery (N/A, 3/25/2022). CURRENT MEDICATIONS       Current Discharge Medication List        CONTINUE these medications which have NOT CHANGED    Details   gabapentin (NEURONTIN) 300 MG capsule Take 1 capsule by mouth nightly for 30 days. Qty: 30 capsule, Refills: 0    Associated Diagnoses: Chronic pain syndrome      oxyCODONE-acetaminophen (PERCOCET)  MG per tablet Take 1 tablet by mouth every 8 hours as needed for Pain for up to 30 days. Intended supply: 30 days  Qty: 90 tablet, Refills: 0    Comments: Reduce doses taken as pain becomes manageable  Associated Diagnoses: Chronic pain syndrome      doxepin (SINEQUAN) 25 MG capsule take 2 capsules by mouth at bedtime  Qty: 60 capsule, Refills: 3      CPAP Machine MISC by Does not apply route Please change bipap to 18/14 cm H20. Qty: 1 each, Refills: 0      carvedilol (COREG) 25 MG tablet Take 2 tablets by mouth 2 times daily  Qty: 120 tablet, Refills: 5      oxybutynin (DITROPAN-XL) 5 MG extended release tablet Take 1 tablet by mouth at bedtime  Qty: 30 tablet, Refills: 0      amitriptyline (ELAVIL) 10 MG tablet Take 10 mg by mouth 2 times daily      bumetanide (BUMEX) 1 MG tablet Take 2 tablets by mouth daily AND 1 tablet as needed.   Qty: 60 tablet, Refills: 6      potassium chloride (K-TAB) 10 MEQ extended release tablet Take 1 tablet by mouth daily  Qty: 60 tablet, Refills: 3      tamsulosin (FLOMAX) 0.4 MG capsule Take 1 capsule by mouth nightly  Qty: 90 capsule, Refills: 3      hydrALAZINE (APRESOLINE) 25 MG tablet Take 1 tablet by mouth every 8 hours  Qty: 90 tablet, Refills: 3      levothyroxine (SYNTHROID) 175 MCG tablet Take 1 tablet by mouth Daily  Qty: 30 tablet, Refills: 3      amLODIPine (NORVASC) 10 MG tablet Take 1 tablet by mouth daily  Qty: 30 tablet, Refills: 3      melatonin 3 MG TABS tablet Take 1.5 tablets by mouth nightly as needed (Sleep)  Qty: 45 tablet, Refills: 3      ipratropium-albuterol (DUONEB) 0.5-2.5 (3) MG/3ML SOLN nebulizer solution Inhale 3 mLs into the lungs every 4 hours as needed for Shortness of Breath  Qty: 360 mL, Refills: 1      Fluticasone furoate-vilanterol (BREO ELLIPTA) 200-25 MCG/INH AEPB inhaler Inhale 1 puff into the lungs daily  Qty: 3 each, Refills: 3    Associated Diagnoses: Mild intermittent asthma without complication      montelukast (SINGULAIR) 10 MG tablet Take 1 tablet by mouth nightly  Qty: 90 tablet, Refills: 3    Associated Diagnoses: Mild intermittent asthma without complication; Wheezing      albuterol sulfate  (90 Base) MCG/ACT inhaler Inhale 2 puffs into the lungs every 6 hours as needed for Wheezing or Shortness of Breath  Qty: 1 each, Refills: 5    Associated Diagnoses: Mild intermittent asthma without complication      rOPINIRole (REQUIP) 1 MG tablet Take 1 pill in afternoon and 2 at bedtime  Qty: 270 tablet, Refills: 1      aspirin EC 81 MG EC tablet Take 1 tablet by mouth daily  Qty: 90 tablet, Refills: 1      nitroGLYCERIN (NITROSTAT) 0.4 MG SL tablet Place 1 tablet under the tongue every 5 minutes as needed for Chest pain (up to 3 doses)  Qty: 25 tablet, Refills: 3      glimepiride (AMARYL) 4 MG tablet Take 4 mg by mouth daily       azelastine (ASTELIN) 137 MCG/SPRAY nasal spray 1 spray by Nasal route as needed.  Use in each nostril as directed             ALLERGIES     is allergic to shellfish-derived products, pcn [penicillins], succinylcholine, sulfa antibiotics, morphine, and tape [adhesive tape]. FAMILY HISTORY     He indicated that his mother is . He indicated that his father is . He indicated that all of his four brothers are alive. He indicated that the status of his paternal grandmother is unknown. He indicated that the status of his neg hx is unknown.   family history includes Arthritis in his brother; Cancer in his father and mother; Diabetes in his paternal grandmother; Heart Attack in his brother; Heart Disease in his father; Prostate Cancer in his brother; Stroke in his brother. SOCIAL HISTORY      reports that he has quit smoking. His smoking use included pipe. His smokeless tobacco use includes chew. He reports that he does not drink alcohol and does not use drugs. PHYSICAL EXAM     INITIAL VITALS:  height is 6' 1\" (1.854 m) and weight is 339 lb (153.8 kg) (abnormal). His oral temperature is 97.7 °F (36.5 °C). His blood pressure is 142/71 (abnormal) and his pulse is 63. His respiration is 18 and oxygen saturation is 95%. Physical Exam   Constitutional:  well-developed and well-nourished. HENT: Head: Normocephalic, atraumatic, Bilateral external ears normal, Oropharynx mosit, No oral exudates, Nose normal.   Eyes: PERRL, EOMI, Conjunctiva normal, No discharge. No scleral icterus  Neck: Normal range of motion, No tenderness, Supple  Cardiovascular: Normal rate, regular rhythm, S1 normal and S2 normal.  Exam reveals no gallop. Pulmonary/Chest: Effort normal and breath sounds are diminished/fine crackles bilateral lung base. No accessory muscle usage or stridor. No respiratory distress. no wheezes. has no rales. exhibits no tenderness. Abdominal: Soft. Bowel sounds are normal.  exhibits no distension. There is no tenderness. There is no rebound and no guarding. Extremities: Bilateral lower extremity +2 pitting edema, no tenderness, no cyanosis, no clubbing. Musculoskeletal: Good range of motion in major joints is observed.   No major deformities noted.  Neurological: Alert and oriented ×3, normal motor function, normal sensory function, no focal deficits. GCS 15  Skin: Skin is warm, dry and intact. No rash noted. No erythema. Psychiatric: Affect normal, judgment normal, mood normal.  DIFFERENTIAL DIAGNOSIS:       DIAGNOSTIC RESULTS     EKG: All EKG's are interpreted by the Emergency Department Physician who either signs or Co-signs this chart in the absence of a cardiologist.      RADIOLOGY: non-plain film images(s) such as CT, Ultrasound and MRI are read by the radiologist.  Plain radiographic images are visualized and preliminarily interpreted by the emergency physician unless otherwise stated below.     LABS:   Labs Reviewed   CBC WITH AUTO DIFFERENTIAL - Abnormal; Notable for the following components:       Result Value    RBC 3.02 (*)     Hemoglobin 7.7 (*)     Hematocrit 24.9 (*)     MCH 25.5 (*)     MCHC 30.9 (*)     RDW-CV 17.8 (*)     RDW-SD 52.4 (*)     MPV 8.4 (*)     Lymphocytes Absolute 0.5 (*)     All other components within normal limits   COMPREHENSIVE METABOLIC PANEL W/ REFLEX TO MG FOR LOW K - Abnormal; Notable for the following components:    Creatinine 3.3 (*)     BUN 35 (*)     Potassium reflex Magnesium 3.4 (*)     CO2 22 (*)     Albumin 3.3 (*)     All other components within normal limits   TROPONIN - Abnormal; Notable for the following components:    Troponin T 0.028 (*)     All other components within normal limits   BRAIN NATRIURETIC PEPTIDE - Abnormal; Notable for the following components:    Pro-BNP 8444.0 (*)     All other components within normal limits   MAGNESIUM - Abnormal; Notable for the following components:    Magnesium 1.5 (*)     All other components within normal limits   GLOMERULAR FILTRATION RATE, ESTIMATED - Abnormal; Notable for the following components:    Est, Glom Filt Rate 19 (*)     All other components within normal limits   BASIC METABOLIC PANEL W/ REFLEX TO MG FOR LOW K - Abnormal; Notable for the following components:    Potassium reflex Magnesium 3.1 (*)     BUN 38 (*)     Creatinine 3.2 (*)     All other components within normal limits   CBC WITH AUTO DIFFERENTIAL - Abnormal; Notable for the following components:    RBC 2.74 (*)     Hemoglobin 6.9 (*)     Hematocrit 22.5 (*)     MCH 25.2 (*)     MCHC 30.7 (*)     RDW-CV 17.7 (*)     RDW-SD 52.5 (*)     MPV 8.3 (*)     Lymphocytes Absolute 0.5 (*)     All other components within normal limits   GLOMERULAR FILTRATION RATE, ESTIMATED - Abnormal; Notable for the following components:    Est, Glom Filt Rate 20 (*)     All other components within normal limits   HEMOGLOBIN AND HEMATOCRIT - Abnormal; Notable for the following components:    Hemoglobin 7.3 (*)     Hematocrit 24.2 (*)     All other components within normal limits   ANION GAP   OSMOLALITY   ANION GAP   MAGNESIUM   SCAN OF BLOOD SMEAR   URINALYSIS WITH REFLEX TO CULTURE   COMPREHENSIVE METABOLIC PANEL       EMERGENCY DEPARTMENT COURSE:   Vitals:    Vitals:    07/20/22 1117 07/20/22 1317 07/20/22 1344 07/20/22 1531   BP: (!) 145/74 (!) 145/75  (!) 142/71   Pulse: 62   63   Resp: 18      Temp: 97.3 °F (36.3 °C)   97.7 °F (36.5 °C)   TempSrc: Oral   Oral   SpO2: 95%  92% 95%   Weight:       Height:         Positive for shortness of breath with weight gain secondary to acute on chronic CHF, he has been holding his diuretic for the past week due to worsening kidney failure. He's a patient of Dr. Zelda Moon, sees Dr. Sarath Posada for heart failure. CRITICAL CARE:       CONSULTS:  None    PROCEDURES:  None    FINAL IMPRESSION      1. Acute on chronic congestive heart failure, unspecified heart failure type ([address]    2. Acute renal failure superimposed on chronic kidney disease, unspecified CKD stage, unspecified acute renal failure type (HCC)    3. Elevated troponin    4. Hypokalemia    5. Hypomagnesemia    6.  Anemia, unspecified type          DISPOSITION/PLAN   Admitted    PATIENT REFERRED TO:  No follow-up

## 2022-07-19 NOTE — TELEPHONE ENCOUNTER
OARRS reviewed. UDS: + for  .    Last seen: 5/25/22 Follow-up:   Future Appointments   Date Time Provider Ismael Haas   7/27/2022  8:45 AM SAHIL Falcon CNP N [facility] Pain [facility] - [place]   10/3/2022  2:45 PM Nii Zamora PA-C N Pulm Med [facility] [place]   10/17/2022  2:20 PM Genoveva Bee MD N [facility]. [facility] [place]   10/24/2022 11:30 AM SAHIL Perez CNP N [facility] CHF P - [place]   11/14/2022  3:00 PM SAHIL Kendrick CNP Med P - [place]   1/5/2023  2:45 PM Jonn Avendaño MD N [facility] Heart P - [place]   4/13/2023 12:45 PM Omer Conn  [facility]

## 2022-07-19 NOTE — ED TRIAGE NOTES
Patient presents to ER with complaints of shortness of breath, bilateral leg swelling, and abdominal distention that started a week ago. Patient reports doctors have taken him off of diuretic due to kidney issues. Patient arrived at 87% SpO2 on 3L O2 NC. Current SpO2 95% on 4L O2 NC. EKG obtained. Telemetry applied.

## 2022-07-20 PROBLEM — I50.43: Status: RESOLVED | Noted: 2022-07-19 | Resolved: 2022-07-20

## 2022-07-20 LAB
ANION GAP SERPL CALCULATED.3IONS-SCNC: 13 MEQ/L (ref 8–16)
BASOPHILIA: ABNORMAL
BASOPHILIC STIPPLING: ABNORMAL
BASOPHILS # BLD: 0.6 %
BASOPHILS ABSOLUTE: 0 THOU/MM3 (ref 0–0.1)
BUN BLDV-MCNC: 38 MG/DL (ref 7–22)
CALCIUM SERPL-MCNC: 8.7 MG/DL (ref 8.5–10.5)
CHLORIDE BLD-SCNC: 105 MEQ/L (ref 98–111)
CO2: 23 MEQ/L (ref 23–33)
CREAT SERPL-MCNC: 3.2 MG/DL (ref 0.4–1.2)
EOSINOPHIL # BLD: 8.8 %
EOSINOPHILS ABSOLUTE: 0.4 THOU/MM3 (ref 0–0.4)
ERYTHROCYTE [DISTWIDTH] IN BLOOD BY AUTOMATED COUNT: 17.7 % (ref 11.5–14.5)
ERYTHROCYTE [DISTWIDTH] IN BLOOD BY AUTOMATED COUNT: 52.5 FL (ref 35–45)
GFR SERPL CREATININE-BSD FRML MDRD: 20 ML/MIN/1.73M2
GLUCOSE BLD-MCNC: 95 MG/DL (ref 70–108)
HCT VFR BLD CALC: 22.5 % (ref 42–52)
HCT VFR BLD CALC: 24.2 % (ref 42–52)
HEMOGLOBIN: 6.9 GM/DL (ref 14–18)
HEMOGLOBIN: 7.3 GM/DL (ref 14–18)
IMMATURE GRANS (ABS): 0.02 THOU/MM3 (ref 0–0.07)
IMMATURE GRANULOCYTES: 0.4 %
LYMPHOCYTES # BLD: 9.7 %
LYMPHOCYTES ABSOLUTE: 0.5 THOU/MM3 (ref 1–4.8)
MAGNESIUM: 1.6 MG/DL (ref 1.6–2.4)
MCH RBC QN AUTO: 25.2 PG (ref 26–33)
MCHC RBC AUTO-ENTMCNC: 30.7 GM/DL (ref 32.2–35.5)
MCV RBC AUTO: 82.1 FL (ref 80–94)
MONOCYTES # BLD: 11.3 %
MONOCYTES ABSOLUTE: 0.6 THOU/MM3 (ref 0.4–1.3)
NUCLEATED RED BLOOD CELLS: 0 /100 WBC
PATHOLOGIST REVIEW: ABNORMAL
PLATELET # BLD: 221 THOU/MM3 (ref 130–400)
PLATELET ESTIMATE: ADEQUATE
PMV BLD AUTO: 8.3 FL (ref 9.4–12.4)
POTASSIUM REFLEX MAGNESIUM: 3.1 MEQ/L (ref 3.5–5.2)
RBC # BLD: 2.74 MILL/MM3 (ref 4.7–6.1)
SCAN OF BLOOD SMEAR: NORMAL
SEG NEUTROPHILS: 69.2 %
SEGMENTED NEUTROPHILS ABSOLUTE COUNT: 3.5 THOU/MM3 (ref 1.8–7.7)
SODIUM BLD-SCNC: 141 MEQ/L (ref 135–145)
WBC # BLD: 5.1 THOU/MM3 (ref 4.8–10.8)

## 2022-07-20 PROCEDURE — 99233 SBSQ HOSP IP/OBS HIGH 50: CPT | Performed by: INTERNAL MEDICINE

## 2022-07-20 PROCEDURE — 6370000000 HC RX 637 (ALT 250 FOR IP): Performed by: INTERNAL MEDICINE

## 2022-07-20 PROCEDURE — 36415 COLL VENOUS BLD VENIPUNCTURE: CPT

## 2022-07-20 PROCEDURE — 1200000003 HC TELEMETRY R&B

## 2022-07-20 PROCEDURE — 83735 ASSAY OF MAGNESIUM: CPT

## 2022-07-20 PROCEDURE — 6370000000 HC RX 637 (ALT 250 FOR IP): Performed by: STUDENT IN AN ORGANIZED HEALTH CARE EDUCATION/TRAINING PROGRAM

## 2022-07-20 PROCEDURE — 85014 HEMATOCRIT: CPT

## 2022-07-20 PROCEDURE — 2580000003 HC RX 258

## 2022-07-20 PROCEDURE — 6370000000 HC RX 637 (ALT 250 FOR IP)

## 2022-07-20 PROCEDURE — 6360000002 HC RX W HCPCS

## 2022-07-20 PROCEDURE — 85018 HEMOGLOBIN: CPT

## 2022-07-20 PROCEDURE — 94761 N-INVAS EAR/PLS OXIMETRY MLT: CPT

## 2022-07-20 PROCEDURE — 80048 BASIC METABOLIC PNL TOTAL CA: CPT

## 2022-07-20 PROCEDURE — 85025 COMPLETE CBC W/AUTO DIFF WBC: CPT

## 2022-07-20 RX ORDER — HYDRALAZINE HYDROCHLORIDE 50 MG/1
50 TABLET, FILM COATED ORAL EVERY 8 HOURS SCHEDULED
Status: DISCONTINUED | OUTPATIENT
Start: 2022-07-20 | End: 2022-07-21 | Stop reason: HOSPADM

## 2022-07-20 RX ORDER — FUROSEMIDE 10 MG/ML
40 INJECTION INTRAMUSCULAR; INTRAVENOUS ONCE
Status: COMPLETED | OUTPATIENT
Start: 2022-07-20 | End: 2022-07-20

## 2022-07-20 RX ORDER — AMLODIPINE BESYLATE 5 MG/1
5 TABLET ORAL DAILY
Status: DISCONTINUED | OUTPATIENT
Start: 2022-07-21 | End: 2022-07-21 | Stop reason: HOSPADM

## 2022-07-20 RX ADMIN — AMLODIPINE BESYLATE 10 MG: 10 TABLET ORAL at 08:33

## 2022-07-20 RX ADMIN — SODIUM CHLORIDE, PRESERVATIVE FREE 10 ML: 5 INJECTION INTRAVENOUS at 08:32

## 2022-07-20 RX ADMIN — DOXEPIN HYDROCHLORIDE 50 MG: 25 CAPSULE ORAL at 21:44

## 2022-07-20 RX ADMIN — HYDRALAZINE HYDROCHLORIDE 25 MG: 25 TABLET, FILM COATED ORAL at 13:17

## 2022-07-20 RX ADMIN — POTASSIUM CHLORIDE 40 MEQ: 20 TABLET, EXTENDED RELEASE ORAL at 08:31

## 2022-07-20 RX ADMIN — OXYCODONE AND ACETAMINOPHEN 1 TABLET: 10; 325 TABLET ORAL at 15:27

## 2022-07-20 RX ADMIN — SALINE NASAL SPRAY 1 SPRAY: 1.5 SOLUTION NASAL at 08:33

## 2022-07-20 RX ADMIN — HYDRALAZINE HYDROCHLORIDE 50 MG: 50 TABLET, FILM COATED ORAL at 21:44

## 2022-07-20 RX ADMIN — ASPIRIN 81 MG: 81 TABLET, COATED ORAL at 08:31

## 2022-07-20 RX ADMIN — FUROSEMIDE 40 MG: 10 INJECTION, SOLUTION INTRAMUSCULAR; INTRAVENOUS at 16:16

## 2022-07-20 RX ADMIN — SODIUM CHLORIDE, PRESERVATIVE FREE 10 ML: 5 INJECTION INTRAVENOUS at 21:44

## 2022-07-20 RX ADMIN — Medication 4.5 MG: at 21:44

## 2022-07-20 RX ADMIN — AMITRIPTYLINE HYDROCHLORIDE 10 MG: 10 TABLET, FILM COATED ORAL at 00:15

## 2022-07-20 RX ADMIN — ENOXAPARIN SODIUM 30 MG: 100 INJECTION SUBCUTANEOUS at 21:44

## 2022-07-20 RX ADMIN — HYDRALAZINE HYDROCHLORIDE 25 MG: 25 TABLET, FILM COATED ORAL at 05:36

## 2022-07-20 RX ADMIN — OXYCODONE AND ACETAMINOPHEN 1 TABLET: 10; 325 TABLET ORAL at 00:14

## 2022-07-20 RX ADMIN — ROPINIROLE HYDROCHLORIDE 1 MG: 1 TABLET, FILM COATED ORAL at 21:44

## 2022-07-20 RX ADMIN — CARVEDILOL 50 MG: 25 TABLET, FILM COATED ORAL at 21:44

## 2022-07-20 RX ADMIN — AMLODIPINE BESYLATE 10 MG: 10 TABLET ORAL at 00:14

## 2022-07-20 RX ADMIN — TAMSULOSIN HYDROCHLORIDE 0.4 MG: 0.4 CAPSULE ORAL at 21:44

## 2022-07-20 RX ADMIN — CARVEDILOL 50 MG: 25 TABLET, FILM COATED ORAL at 08:33

## 2022-07-20 RX ADMIN — AMITRIPTYLINE HYDROCHLORIDE 10 MG: 10 TABLET, FILM COATED ORAL at 08:33

## 2022-07-20 RX ADMIN — ROPINIROLE HYDROCHLORIDE 1 MG: 1 TABLET, FILM COATED ORAL at 00:14

## 2022-07-20 RX ADMIN — LEVOTHYROXINE SODIUM 175 MCG: 0.15 TABLET ORAL at 05:36

## 2022-07-20 RX ADMIN — ONDANSETRON 4 MG: 2 INJECTION INTRAMUSCULAR; INTRAVENOUS at 23:38

## 2022-07-20 RX ADMIN — DOXEPIN HYDROCHLORIDE 50 MG: 25 CAPSULE ORAL at 00:14

## 2022-07-20 RX ADMIN — CARVEDILOL 50 MG: 25 TABLET, FILM COATED ORAL at 00:13

## 2022-07-20 RX ADMIN — AMITRIPTYLINE HYDROCHLORIDE 10 MG: 10 TABLET, FILM COATED ORAL at 21:44

## 2022-07-20 RX ADMIN — HYDRALAZINE HYDROCHLORIDE 25 MG: 25 TABLET, FILM COATED ORAL at 00:15

## 2022-07-20 ASSESSMENT — PAIN DESCRIPTION - DESCRIPTORS
DESCRIPTORS: ACHING
DESCRIPTORS: ACHING
DESCRIPTORS: THROBBING;DULL

## 2022-07-20 ASSESSMENT — PAIN - FUNCTIONAL ASSESSMENT
PAIN_FUNCTIONAL_ASSESSMENT: ACTIVITIES ARE NOT PREVENTED
PAIN_FUNCTIONAL_ASSESSMENT: ACTIVITIES ARE NOT PREVENTED

## 2022-07-20 ASSESSMENT — PAIN DESCRIPTION - LOCATION
LOCATION: BACK
LOCATION: BACK
LOCATION: GENERALIZED

## 2022-07-20 ASSESSMENT — PAIN DESCRIPTION - ORIENTATION
ORIENTATION: MID
ORIENTATION: LOWER
ORIENTATION: MID

## 2022-07-20 ASSESSMENT — PAIN SCALES - GENERAL
PAINLEVEL_OUTOF10: 7
PAINLEVEL_OUTOF10: 8
PAINLEVEL_OUTOF10: 8

## 2022-07-20 NOTE — PROGRESS NOTES
A home oxygen evaluation has been completed. [x]Patient is an inpatient. It is expected that the patient will be discharged within the next 48 hours. Qualified provider to write order for home prescription if patient qualifies. Social service/care managers will arrange for home oxygen. If patient is active, arrange for Home Medical supplier to assess for Oxygen Conserving Device per pulse oximetry. []Patient is an outpatient. Results will be faxed to the ordering provider. Qualified provider to write order for home prescription if patient qualifies and arranges for home oxygen. Patient was placed on room air for 1 minutes. SpO2 was 84 % on room air at rest. Patients SpO2 was below 89% and qualified for home oxygen. Oxygen was applied at 3 lpm via nasal cannula to maintain a SpO2 between 90-92% while at rest. Actual SpO2 was 92 %. Patient can ambulate for exercise flow rate, starting out on 3 lpm nc. Patients was ambulated, SpO2 was 90% on 6 lpm to maintain SpO2 between 90-92% while exercising. If oxygen need is greater than 4 lpm the SpO2 on 4 lpm was 87. Note: For any SpO2 at 62% see policy and procedure for possible qualifications.

## 2022-07-20 NOTE — PLAN OF CARE
Problem: Discharge Planning  Goal: Discharge to home or other facility with appropriate resources  Outcome: Progressing  Flowsheets (Taken 7/19/2022 1715 by Jamie Lanes, RN)  Discharge to home or other facility with appropriate resources:   Identify barriers to discharge with patient and caregiver   Identify discharge learning needs (meds, wound care, etc)   Refer to discharge planning if patient needs post-hospital services based on physician order or complex needs related to functional status, cognitive ability or social support system   Arrange for needed discharge resources and transportation as appropriate  Note: Pt prefers to return home upon discharge with wife. Problem: Chronic Conditions and Co-morbidities  Goal: Patient's chronic conditions and co-morbidity symptoms are monitored and maintained or improved  Outcome: Progressing  Note: Pt does have a chronic heart and lung history, does see CHF clinic outpatient. Will continue to assess for decline in condition. Problem: Safety - Adult  Goal: Free from fall injury  Outcome: Progressing  Note: No falls so far this shift, call light and bedside table within reach. Bed in lowest position and alarm on, nonskid socks on bilaterally. Problem: ABCDS Injury Assessment  Goal: Absence of physical injury  Outcome: Progressing  Note: Pt remains safe at this time, no harm to self or others. Care plan reviewed with patient. Patient verbalize understanding of the plan of care and contribute to goal setting.

## 2022-07-20 NOTE — FLOWSHEET NOTE
07/19/22 2235   Treatment Team Notification   Reason for Communication Evaluate   Team Member Name Dr. Treva Maher Team Role Attending Provider   Method of Communication Secure Message   Response See orders   Notification Time 2235     Dr. Eugenio Harada paged regarding pt requesting nasal spray for congestion and home percocet 1 tablet to be ordered every 8 hours as needed.  Refer to [facility], order was placed by Dr. Eugenio Harada.

## 2022-07-20 NOTE — CARE COORDINATION
7/20/22, 7:52 AM EDT  DISCHARGE PLANNING EVALUATION:    Lianna Fairchild       Admitted: 7/19/2022/ [facility] day: 1   Location: 6-11/011-A Reason for admit: Hypokalemia [E87.6]  Hypomagnesemia [E83.42]  Elevated troponin [R77.8]  CHF NYHA class III (symptoms with mildly strenuous activities), acute on chronic, combined ([facility]) [I50.43]  CHF (congestive heart failure), NYHA class III, acute on chronic, combined ([address] [I50.43]  Anemia, unspecified type [D64.9]  Acute renal failure superimposed on chronic kidney disease, unspecified CKD stage, unspecified acute renal failure type ([address] [N17.9, N18.9]  Acute on chronic congestive heart failure, unspecified heart failure type ([address] [I50.9]   PMH:  has a past medical history of Arthritis, Back problem, Bladder disease, CHF with unknown LVEF ([address] Constipation, Diabetes mellitus ([address] Hypertension, Hypertensive emergency, Hypertensive urgency, Sleep apnea, and Thyroid disease. Barriers to Discharge:  Creat 3.3, K+ 3.4, Mg 1.5, Mg 8444, trops elev, Hgb 7.7. Oxygen at 5L NC, 96%. IV lasix given x2. Lyte replacement. PCP: Lila Moreno DO  Readmission Risk Score: 23.8%  Patient's Healthcare Decision Maker: Named in [address] [place] [address]    Patient Goals/Plan/Treatment Preferences: Met with Perla Lainez, he plans to return home with his wife at discharge. He amb independently, uses home oxygen at 2L prn ([facility]), and has home CPAP. He follows at the CHF clinic. He confirms PCP and insurance. Transportation/Food Security/Housekeeping Addressed:  No issues identified.

## 2022-07-20 NOTE — PROGRESS NOTES
Attending supervising physicians attestation statement:       I Discussed the findings and plans with the resident physician  personally   and agree with the IM resident'S note as outlined . Also spoke with the staff  Regarding care plans and recommendations.        Electronically signed by Josie Souza MD on 7/20/2022 at 9:33 PM        Internal Medicine Resident Progress Note    Patient:  Karli Ricks    YOB: 1960  Unit/Bed:-11/011-A  MRN: [id number]    Acct: [de-identified]   PCP: Eliazar Steele DO    Date of Admission: 7/19/2022      Assessment/Plan:  #Acute on chronic diastolic heart failure, EF 55%, s/p CardioMems  #Acute on chronic hypoxic respiratory failure  - Patient's diuretics had been stopped in the outpatient setting 1 week prior to admission secondary to concerns of worsening kidney function  - 07/19 BNP 8444  - 07/19 CXR showing hepatomegaly and moderate pulmonary edema, consistent with imaging from prior admission  - 07/20 patient received another dose of Lasix; repeat BMP in the a.m.  - Strict I/O and daily weights  - Patient compliant and benefiting from BiPAP therapy; continued in the inpatient setting    #Chronic kidney disease, stage IIIa  #Anemia of chronic disease  - Patient followed in the outpatient setting by nephrology  - Trend H&H  - We will expect creatinine increase secondary to diuresis on 07/20  -Will avoid other nephrotoxic medications in the inpatient setting  -Anemia of chronic disease likely secondary to chronic kidney disease  -Will consider transfusion if patient is symptomatic and drops below Hb of 7  - Repeat BMP ordered for 07/20 1 AM, will reassess at the bedside whether or not the patient is in need of repeat diuresis    #Diarrhea, chronic  #Fecal incontinence  #S/P Abdominal Exploration w/ Lysis of complicated  Adhesions with an extended right colon resection in February 2018  #S/p abdominal lysis of adhesions, explant of infected mesh March 2022  #S/p Hx 3 hernia repairs consisting of 3 surgeries with 14 repairs  -Patient has had diarrhea since 2018, now consistently since March 2022. - Endorses having diarrhea and incontinence 6 out of 7 days of the week. - Per patient, GI PCR sent in the outpatient setting. Given the patient's current signs and symptoms, infectious etiology is unlikely. - We will start the patient on a stool control regimen on discharge. #Change in eating patterns  - Patient endorses changing his p.o. intake to reduce the frequency of his diarrhea. Patient counseled on maintaining adequate nutrition and reassured that his bowel regimen will be reviewed prior to discharge. - Patient has been eating meals in the inpatient setting without difficulty. #Troponin elevation, resolved  - Likely elevated in response to demand ischemia as well as chronic kidney disease    #Hypokalemia  #Hypomagnesemia  -Replacement protocol in place    #Type 2 diabetes mellitus, not on insulin, without complication  - Patient maintaining adequate glycemic control in the outpatient setting with glimepiride; continue to the inpatient setting without complication    #Hypothyroidism  - Patient's home Synthroid resumed    #Chronic pain  #Severe hypertrophic vertebral body spondylosis of the lumbar spine  #Retrolisthesis of the lumbar spine  #Degenerative facet arthropathy of the lumbar spine  - Patient given heating pad  - Patient maintained on amitriptyline, gabapentin, oxycodone-acetaminophen    #COPD, not in acute exacerbation  - Patient maintained on DuoNeb, Breo, albuterol HFA in the outpatient setting; patient's current respiratory symptoms likely secondary to heart failure. - Patient is typically on room air at baseline at home, however in the last 2 months he has been on 2 L continuously. - Home O2 eval performed on 07/20 and patient had increasing oxygen demands.  - Patient received another round of diuresis in the p.m. of 07/20.   Home O2 eval to be repeated on 07/21 pending his nighttime oxygen requirements at rest.    #Nonrestorative sleep  #Restless leg syndrome  - Patient's home doxepin, melatonin, and ropinirole resumed  - Patient's home BiPAP resumed at 18 cm / 14 cm    Expected discharge date: 07/21/2022    Disposition:   [x] Home  [] TCU  [] Rehab  [] Psych  [] SNF  [] [place]  [] Other-    ===================================================================      Chief Complaint:   Increasing shortness of breath with dyspnea at rest    Hospital Course: This is a 65-year-old male with a past medical history of chronic diastolic heart failure, CKD stage IIIa, chronic diarrhea, diabetes mellitus who presented to [facility] [address] with worsening shortness of breath and increasing bilateral lower extremity edema. Over the last 2 weeks he has noticed that his difficulty in breathing has been increasing and he has had to use at least 2 L of supplemental oxygen continuously. The patient is seen in the heart failure clinic on a regular basis by an KARY. Given concerns about his chronic kidney disease, he was advised to stop taking his scheduled diuretic. In the ED, BP on admission was 167/92 and patient was maintaining 95% SPO2 on 4 L continuous. Labs were significant for potassium 3.4, bicarb 22, BUN 35, creatinine 3.3 (baseline per notes is 3.7; per chart review patient's CR has only been above 3 since May 2022, his baseline over the last 4 months has been around 2.0). BNP was 8444, troponin 0.028, Hb 7.7, HCT 24.9. CXR showed moderate pulmonary edema with enlarged cardiac silhouette, consistent with imaging from April 2022. The patient was admitted and given 2 doses of IV Lasix. His home medications were resumed. The patient was given a home O2 evaluation on 07/20 which showed evidence of increased oxygen demand. He was given another dose of IV Lasix. Subjective (past 24 hours):  Patient seen and evaluated at the bedside.   He denies chest pain, fever, chills. Over the last 2 weeks he has had increased nausea and difficulties with eating, secondary to his concerns about having diarrhea and fecal incontinence. He has had increasing difficulty with participating in his ADLs secondary to his diarrhea and difficulty with breathing. He is extremely frustrated that he is no longer able to participate in hobbies outside of the home. He is greatly concerned about his kidney function and is eager to follow-up with his outpatient nephrologist to assess whether or not his kidney function can improve. He recognizes that his care is complex due to concerns with his lung function, heart function as well as his kidney function. He is agreeable to having the hospitalist team manage his care in order to help him be discharged in the most stable condition possible and is agreeable to outpatient follow-up with his cardiologist and nephrologist.  He denies any other acute symptoms or concerns. When asked about his multiple TCA regimen at home, he admits that he gets the amitriptyline for chronic neuropathy secondary to his back pain. He is receiving the doxepin in order to help with his difficulty sleeping and feels that the use of both is efficacious. ROS: reviewed complete ROS unchanged unless otherwise stated in hospital course/subjective portion.        Medications:  Reviewed    Infusion Medications    sodium chloride       Scheduled Medications    [START ON 7/21/2022] amLODIPine  5 mg Oral Daily    hydrALAZINE  50 mg Oral 3 times per day    magnesium sulfate  2,000 mg IntraVENous Once    sodium chloride flush  5-40 mL IntraVENous 2 times per day    enoxaparin  30 mg SubCUTAneous Q12H    aspirin EC  81 mg Oral Daily    carvedilol  50 mg Oral BID    doxepin  50 mg Oral Nightly    levothyroxine  175 mcg Oral Daily    rOPINIRole  1 mg Oral Nightly    tamsulosin  0.4 mg Oral Nightly    amitriptyline  10 mg Oral BID     PRN Meds: sodium chloride flush, sodium chloride, ondansetron **OR** ondansetron, polyethylene glycol, acetaminophen **OR** acetaminophen, potassium chloride **OR** potassium alternative oral replacement **OR** potassium chloride, melatonin, oxyCODONE-acetaminophen, sodium chloride        Intake/Output Summary (Last 24 hours) at 7/20/2022 1948  Last data filed at 7/20/2022 1531  Gross per 24 hour   Intake 1080 ml   Output 1550 ml   Net -470 ml       Exam:  BP (!) 142/71   Pulse 63   Temp 97.7 °F (36.5 °C) (Oral)   Resp 18   Ht 6' 1\" (1.854 m)   Wt (!) 339 lb (153.8 kg)   SpO2 95%   BMI 44.73 kg/m²     General: This is an elderly gentleman sitting comfortably in a recliner in mild distress secondary to concerns about his chronic medical conditions. Eyes:  PERRL. Conjunctivae/corneas clear. HENT: Head normal appearing. Nares normal. Oral mucosa moist.  Hearing intact. Neck: Supple, with full range of motion. Trachea midline. No gross JVD appreciated. Respiratory:  Normal effort. Clear to auscultation, without rales or wheezes or rhonchi. Patient maintained on 4 L of oxygen via nasal cannula. Cardiovascular: Normal rate, regular rhythm with normal S1/S2 without murmurs. No lower extremity edema. Abdomen: Soft, non-tender, non-distended with normal bowel sounds. Musculoskeletal: No joint swelling or tenderness. Normal tone. No abnormal movements. Trace pitting edema in the lower extremities. Skin: Warm and dry. No rashes or lesions. Neurologic:  No focal sensory/motor deficits in the upper or lower extremities. Cranial nerves:  grossly non-focal 2-12. Psychiatric: Alert and oriented, normal insight and thought content. Capillary Refill: Brisk,< 3 seconds. Peripheral Pulses: +2 palpable, equal bilaterally.        Labs:   Recent Labs     07/19/22  1040 07/20/22  0657 07/20/22  0933   WBC 6.7 5.1  --    HGB 7.7* 6.9* 7.3*   HCT 24.9* 22.5* 24.2*    221  --      Recent Labs     07/19/22  1040 07/20/22  0657   NA 141 141   K 3.4* 3.1*    105   CO2 22* 23   BUN 35* 38*   CREATININE 3.3* 3.2*   CALCIUM 8.8 8.7     Recent Labs     07/19/22  1040   AST 13   ALT 15   BILITOT 0.5   ALKPHOS 86     No results for input(s): INR in the last 72 hours. No results for input(s): Enmanuel Pears in the last 72 hours. No results for input(s): PROCAL in the last 72 hours. Lab Results   Component Value Date/Time    NITRU NEGATIVE 03/23/2022 04:45 AM    WBCUA 5-9 03/23/2022 04:45 AM    BACTERIA NONE SEEN 03/23/2022 04:45 AM    RBCUA 25-50 03/23/2022 04:45 AM    BLOODU MODERATE 03/23/2022 04:45 AM    SPECGRAV 1.015 03/23/2022 04:45 AM    GLUCOSEU 500 04/13/2021 08:46 AM       Radiology (48 hours):  XR CHEST PORTABLE    Result Date: 7/19/2022  1. Moderate hepatomegaly. A CARDIOMEMS device is present, projected over left hilar region. 2. Moderate interstitial pneumonia/edema involving both lungs relatively diffusely. Overall appearance slightly worse than prior. **This report has been created using voice recognition software. It may contain minor errors which are inherent in voice recognition technology. ** Final report electronically signed by Dr. Tom Edmondson on 7/19/2022 11:04 AM       DVT prophylaxis:    [x] Lovenox  [] SCDs  [] SQ Heparin  [] Encourage ambulation   [] Already on Anticoagulation       Diet: ADULT DIET; Regular;  Low Sodium (2 gm); 2000 ml  Code Status: Full Code  PT/OT: Consulted  Tele: Continuous  IVF: None    Electronically signed by Navin Lane MD IM RESIDENT on 7/20/2022 at 7:48 PM    Case was discussed with Attending, Dr. Rodriguez Memos

## 2022-07-20 NOTE — PROGRESS NOTES
CHF clinic RN Crystal called to notify RN of patients concerns. RN says patient will not advocate for himself. She wants physicians to be aware of patients creatine levels have spiked in the last 6 months. Patient has diarrhea for about 2 months, patient has stopped eating due to the diarrhea- he takes in minimal food at this time to prevent the diarrhea. Patient would like Dr. Garcia Said consulted as he is aware of the case. Clinic nurse is going to try to speak with a physician tomorrow herself.  Chandrika Key RN

## 2022-07-21 ENCOUNTER — TELEPHONE (OUTPATIENT)
Dept: NEPHROLOGY | Age: 62
End: 2022-07-21

## 2022-07-21 VITALS
HEART RATE: 68 BPM | OXYGEN SATURATION: 93 % | WEIGHT: 315 LBS | DIASTOLIC BLOOD PRESSURE: 76 MMHG | TEMPERATURE: 97.7 F | RESPIRATION RATE: 18 BRPM | BODY MASS INDEX: 41.75 KG/M2 | SYSTOLIC BLOOD PRESSURE: 135 MMHG | HEIGHT: 73 IN

## 2022-07-21 PROBLEM — I50.32 CHF (CONGESTIVE HEART FAILURE), NYHA CLASS II, CHRONIC, DIASTOLIC (HCC): Status: ACTIVE | Noted: 2022-07-21

## 2022-07-21 LAB
ALBUMIN SERPL-MCNC: 2.7 G/DL (ref 3.5–5.1)
ALP BLD-CCNC: 74 U/L (ref 38–126)
ALT SERPL-CCNC: 11 U/L (ref 11–66)
ANION GAP SERPL CALCULATED.3IONS-SCNC: 13 MEQ/L (ref 8–16)
AST SERPL-CCNC: 8 U/L (ref 5–40)
BILIRUB SERPL-MCNC: 0.3 MG/DL (ref 0.3–1.2)
BUN BLDV-MCNC: 44 MG/DL (ref 7–22)
CALCIUM SERPL-MCNC: 8.6 MG/DL (ref 8.5–10.5)
CHLORIDE BLD-SCNC: 105 MEQ/L (ref 98–111)
CO2: 24 MEQ/L (ref 23–33)
CREAT SERPL-MCNC: 3.3 MG/DL (ref 0.4–1.2)
GFR SERPL CREATININE-BSD FRML MDRD: 19 ML/MIN/1.73M2
GLUCOSE BLD-MCNC: 115 MG/DL (ref 70–108)
POTASSIUM SERPL-SCNC: 3.4 MEQ/L (ref 3.5–5.2)
SODIUM BLD-SCNC: 142 MEQ/L (ref 135–145)
TOTAL PROTEIN: 6.8 G/DL (ref 6.1–8)

## 2022-07-21 PROCEDURE — 36415 COLL VENOUS BLD VENIPUNCTURE: CPT

## 2022-07-21 PROCEDURE — 6370000000 HC RX 637 (ALT 250 FOR IP): Performed by: STUDENT IN AN ORGANIZED HEALTH CARE EDUCATION/TRAINING PROGRAM

## 2022-07-21 PROCEDURE — 2700000000 HC OXYGEN THERAPY PER DAY

## 2022-07-21 PROCEDURE — 6360000002 HC RX W HCPCS

## 2022-07-21 PROCEDURE — 99239 HOSP IP/OBS DSCHRG MGMT >30: CPT | Performed by: INTERNAL MEDICINE

## 2022-07-21 PROCEDURE — 80053 COMPREHEN METABOLIC PANEL: CPT

## 2022-07-21 PROCEDURE — 94761 N-INVAS EAR/PLS OXIMETRY MLT: CPT

## 2022-07-21 PROCEDURE — 2580000003 HC RX 258

## 2022-07-21 PROCEDURE — 6370000000 HC RX 637 (ALT 250 FOR IP): Performed by: INTERNAL MEDICINE

## 2022-07-21 RX ORDER — ENOXAPARIN SODIUM 100 MG/ML
40 INJECTION SUBCUTANEOUS EVERY 12 HOURS
Status: DISCONTINUED | OUTPATIENT
Start: 2022-07-21 | End: 2022-07-21 | Stop reason: HOSPADM

## 2022-07-21 RX ADMIN — SODIUM CHLORIDE, PRESERVATIVE FREE 5 ML: 5 INJECTION INTRAVENOUS at 08:57

## 2022-07-21 RX ADMIN — LEVOTHYROXINE SODIUM 175 MCG: 0.15 TABLET ORAL at 06:37

## 2022-07-21 RX ADMIN — AMLODIPINE BESYLATE 5 MG: 5 TABLET ORAL at 08:39

## 2022-07-21 RX ADMIN — OXYCODONE AND ACETAMINOPHEN 1 TABLET: 10; 325 TABLET ORAL at 08:38

## 2022-07-21 RX ADMIN — ASPIRIN 81 MG: 81 TABLET, COATED ORAL at 08:38

## 2022-07-21 RX ADMIN — AMITRIPTYLINE HYDROCHLORIDE 10 MG: 10 TABLET, FILM COATED ORAL at 08:39

## 2022-07-21 RX ADMIN — HYDRALAZINE HYDROCHLORIDE 50 MG: 50 TABLET, FILM COATED ORAL at 06:37

## 2022-07-21 RX ADMIN — SALINE NASAL SPRAY 1 SPRAY: 1.5 SOLUTION NASAL at 08:38

## 2022-07-21 RX ADMIN — ENOXAPARIN SODIUM 40 MG: 100 INJECTION SUBCUTANEOUS at 08:46

## 2022-07-21 RX ADMIN — CARVEDILOL 50 MG: 25 TABLET, FILM COATED ORAL at 08:39

## 2022-07-21 ASSESSMENT — PAIN SCALES - GENERAL: PAINLEVEL_OUTOF10: 7

## 2022-07-21 ASSESSMENT — PAIN DESCRIPTION - DESCRIPTORS: DESCRIPTORS: DULL

## 2022-07-21 ASSESSMENT — PAIN DESCRIPTION - PAIN TYPE: TYPE: CHRONIC PAIN

## 2022-07-21 ASSESSMENT — PAIN DESCRIPTION - ORIENTATION: ORIENTATION: LOWER;MID

## 2022-07-21 ASSESSMENT — PAIN DESCRIPTION - LOCATION: LOCATION: BACK

## 2022-07-21 NOTE — CARE COORDINATION
7/21/22, 11:59 AM EDT    Discharge to home with wife. [facility] DME notified of new home oxygen requirement (previously 2L cont, now 2L rest and 6L with activity). Patient goals/plan/ treatment preferences discussed by  and . Patient goals/plan/ treatment preferences reviewed with patient/ family. Patient/ family verbalize understanding of discharge plan and are in agreement with goal/plan/treatment preferences. Understanding was demonstrated using the teach back method. AVS provided by RN at time of discharge, which includes all necessary medical information pertaining to the patients current course of illness, treatment, post-discharge goals of care, and treatment preferences.      Services At/After Discharge: DME       IMM Letter  IMM Letter given to Patient/Family/Significant other/Guardian/POA/by[de-identified] staff  IMM Letter date given[de-identified] 07/19/22  IMM Letter time given[de-identified] [phone]

## 2022-07-21 NOTE — TELEPHONE ENCOUNTER
Patient called stating he wants to get his retacrit shots. He is currently admitted to [facility]. H/H from 7.20.22 was 7.3. Patient states they are not really doing anything and he isnt sure why they have not consulted you.  [facility]     Please Advise

## 2022-07-21 NOTE — CARE COORDINATION
7/21/22, 8:55 AM EDT    DISCHARGE ON [place] day: 2  [facility] Reason for admit: Hypokalemia [E87.6]  Hypomagnesemia [E83.42]  Elevated troponin [R77.8]  CHF NYHA class III (symptoms with mildly strenuous activities), acute on chronic, combined ([facility]) [I50.43]  CHF (congestive heart failure), NYHA class III, acute on chronic, combined ([address] [I50.43]  Anemia, unspecified type [D64.9]  Acute renal failure superimposed on chronic kidney disease, unspecified CKD stage, unspecified acute renal failure type ([facility] [address] [N17.9, N18.9]  Acute on chronic congestive heart failure, unspecified heart failure type ([facility] [address] [I50.9]   Barriers to Discharge: Creat 3.3, Hgb 7.3. 93% on 2L oxygen. Patient requesting nephrology consult, not yet ordered. PCP: Darren Roth DO  Readmission Risk Score: 24.5%  Patient Goals/Plan/Treatment Preferences: Home with wife. Has home oxygen and cpap from [facility]. Current CHF clinic. Denies needs.

## 2022-07-21 NOTE — PLAN OF CARE
Problem: Discharge Planning  Goal: Discharge to home or other facility with appropriate resources  Outcome: Progressing  Note: Pt prefers to return home upon discharge with wife. Problem: Chronic Conditions and Co-morbidities  Goal: Patient's chronic conditions and co-morbidity symptoms are monitored and maintained or improved  Outcome: Progressing  Note: Pt has CHF, will continue to encourage fluid management do decrease swelling. Problem: Safety - Adult  Goal: Free from fall injury  Outcome: Progressing  Note: Fall protocol in place. Call light and bedside table within reach. Bed in lowest position and alarm on, nonskid socks on bilaterally. Problem: Pain  Goal: Verbalizes/displays adequate comfort level or baseline comfort level  Outcome: Progressing  Note: Pt is resting quietly at this time, will continue to assess using 0-10 pain scale. PRN percocet available, refer to [facility]. Care plan reviewed with patient. Patient verbalize understanding of the plan of care and contribute to goal setting.

## 2022-07-21 NOTE — PROGRESS NOTES
reports he has been eating more regular and normal throughout this admit; pt reports that he will sometimes eat only an apple for an entire day to alleviate diarrhea; pt reports he sent a stool sample to GI doctor awhile back but has not been followed up with yet; educated pt on nutrition recommendations; suspect short bowel syndrome? GI Status: pt reports chronic diarrhea since 2018 s/p bowel resection; intermittent nausea at times; at this time pt reports he has not had a bowel movement in 6-7 days; pt reports he will go from explosive diarrhea to constipation; 75% of colon removed in 2018  Pertinent Labs: Na 142, K 3.4, BUN 44, creatinine 3.3, GFR 19, glucose 115, A1C 4.7  Pertinent Meds: zofran (PRN), hydralazine, amitriptyline, coreg, doxepin, lovenox, flomax    Current Nutrition Intake & Therapies:    Average Meal Intake:  (PO intake varied this admit)  Average Supplements Intake: None Ordered  ADULT DIET; Regular; Low Sodium (2 gm); 2000 ml    Anthropometric Measures:  Height: 6' 1\" (185.4 cm)  Ideal Body Weight (IBW): 184 lbs (84 kg)    Admission Body Weight: 339 lb (153.8 kg) (7/19)  Current Body Weight: 339 lb (153.8 kg) (7/19: +2 lower extremity edema),   IBW.  Weight Source: Standing Scale  Current BMI (kg/m2): 44.7  Usual Body Weight:  (pt reports  lbs and has been losing wt unintentionally; Per EMR wt hx: 5/3/22: 325 lbs 14.4 oz; 7/7/22: 332 lbs 6.4 oz)                       BMI Categories: Obese Class 3 (BMI 40.0 or greater)    Estimated Daily Nutrient Needs:  Energy Requirements Based On: Kcal/kg  Weight Used for Energy Requirements: Current (153.8 kg)  Energy (kcal/day): 0072-2062 kcals/day (10-13 kcals/kg)  Weight Used for Protein Requirements: Ideal (84 kg)  Protein (g/day): 50-67 g/day (0.6-0.8 g/kg IBW) (Monitor renal function)     Fluid (ml/day): 2000 ml/day per MD recommendations    Nutrition Diagnosis:   Inadequate energy intake related to altered GI function as evidenced by GI abnormality, diarrhea, nausea    Nutrition Interventions:   Food and/or Nutrient Delivery: Continue Current Diet  Nutrition Education/Counseling: Education initiated (Discussed diarrhea, renal and heart healthy nutrition recommendations)  Coordination of Nutrition Care: Continue to monitor while inpatient       Goals:     Goals: Meet at least 75% of estimated needs, within 2 days       Nutrition Monitoring and Evaluation:      Food/Nutrient Intake Outcomes: Diet Advancement/Tolerance, Food and Nutrient Intake  Physical Signs/Symptoms Outcomes: Biochemical Data, Diarrhea, GI Status, Constipation, Fluid Status or Edema, Nausea or Vomiting, Nutrition Focused Physical Findings, Meal Time Behavior, Weight, Skin    Discharge Planning:    Recommend pursue outpatient nutrition counseling     Christi Ch RD  Contact: [phone]

## 2022-07-21 NOTE — PLAN OF CARE
Patient was evaluated today for the diagnosis of acute on chronic hypoxic respiratory failure secondary to chronic diastolic heart failure. I entered a DME order for home oxygen because the diagnosis and testing requires the patient to have supplemental oxygen. Condition will improve or be benefited by oxygen use. The patient is not able to perform good mobility in a home setting without the use of supplemental oxygen and therefore does require the use of a portable oxygen system. The need for this equipment was discussed with the patient and he understands and is in agreement.     Per RT evaluation: Patient requires 2 lpm via nasal cannula to maintain a SpO2 between 90-92% while at rest. Patient was ambulated, requiring 6lpm to maintain SpO2 between 90-92% while exercising.     -Radha Coffey MD IM Resident

## 2022-07-21 NOTE — FLOWSHEET NOTE
07/21/22 0601   Treatment Team Notification   Reason for Communication Evaluate   Team Member Name Oswaldo Dill NP   Treatment Team Role Advanced Practice Nurse   Method of Communication Secure Message   Response No new orders   Notification Time 0601     Oswaldo Dill NP made aware of pt wanting nephrology consulted. He sees Dr. Mirna Arriaga outpatient and would like to see him regarding his creatinine. Message was forwarded to Dr. Judi Brown as his group is caring for patient.

## 2022-07-21 NOTE — PLAN OF CARE
Problem: Discharge Planning  Goal: Discharge to home or other facility with appropriate resources  7/21/2022 0856 by Mery Osuna RN  Outcome: Progressing  Flowsheets (Taken 7/21/2022 0830)  Discharge to home or other facility with appropriate resources:   Identify barriers to discharge with patient and caregiver   Arrange for needed discharge resources and transportation as appropriate  7/21/2022 0223 by Etienne Merino RN  Outcome: Progressing  Note: Pt prefers to return home upon discharge with wife. Problem: Chronic Conditions and Co-morbidities  Goal: Patient's chronic conditions and co-morbidity symptoms are monitored and maintained or improved  7/21/2022 0856 by Mery Osuna RN  Outcome: Progressing  Flowsheets (Taken 7/21/2022 0830)  Care Plan - Patient's Chronic Conditions and Co-Morbidity Symptoms are Monitored and Maintained or Improved: Monitor and assess patient's chronic conditions and comorbid symptoms for stability, deterioration, or improvement  7/21/2022 0223 by Etienne Merino RN  Outcome: Progressing  Note: Pt has CHF, will continue to encourage fluid management do decrease swelling. Problem: Safety - Adult  Goal: Free from fall injury  7/21/2022 0856 by Mery Osuna RN  Outcome: Progressing  7/21/2022 0223 by Etienne Merino RN  Outcome: Progressing  Note: Fall protocol in place. Call light and bedside table within reach. Bed in lowest position and alarm on, nonskid socks on bilaterally. Problem: ABCDS Injury Assessment  Goal: Absence of physical injury  Outcome: Progressing     Problem: Pain  Goal: Verbalizes/displays adequate comfort level or baseline comfort level  7/21/2022 0856 by Mery Osuna RN  Outcome: Progressing  7/21/2022 0223 by Etienne Merino RN  Outcome: Progressing  Note: Pt is resting quietly at this time, will continue to assess using 0-10 pain scale. PRN percocet available, refer to [facility]     Reviewed with patient

## 2022-07-21 NOTE — DISCHARGE SUMMARY
Internal Medicine Resident Discharge Summary      Patient Identification:   Lupe Grijalva   : 1960  MRN: [id number]   Account: [de-identified]      Patient's PCP: Zana Donohue DO    Admit Date: 2022     Discharge Date:  2022    Admitting Physician: Srinivasan Garcia MD     Discharge Physician: Bel Arndt MD       Hospital Course: This is a 80-year-old male with a past medical history of chronic diastolic heart failure, CKD stage IIIa, chronic diarrhea, diabetes mellitus who presented to [facility] with worsening shortness of breath and increasing bilateral lower extremity edema. Over the last 2 weeks he has noticed that his difficulty in breathing has been increasing and he has had to use at least 2 L of supplemental oxygen continuously. The patient is seen in the heart failure clinic on a regular basis by an KARY. Given concerns about his chronic kidney disease, he was advised to stop taking his scheduled diuretic. In the ED, BP on admission was 167/92 and patient was maintaining 95% SPO2 on 4 L continuous. Labs were significant for potassium 3.4, bicarb 22, BUN 35, creatinine 3.3 (baseline per notes is 3.7; per chart review patient's CR has only been above 3 since May 2022, his baseline over the last 4 months has been around 2.0). BNP was 8444, troponin 0.028, Hb 7.7, HCT 24.9. CXR showed moderate pulmonary edema with enlarged cardiac silhouette, consistent with imaging from 2022. The patient was admitted and given 2 doses of IV Lasix. His home medications were resumed. The patient was given a home O2 evaluation on  which showed evidence of increased oxygen demand. He was given another dose of IV Lasix. The patient was discharged in stable condition with his home bumetanide resumed and a new DME prescription for supplemental oxygen. He was also scheduled to follow up with his outpatient nephrologist on 2022.       Discharge Diagnoses:  #Acute on chronic diastolic heart failure, EF 55% s/p CardioMems  #Acute on chronic hypoxic respiratory failure  #Chronic Kidney disease, stage IIIa  #Anemia of chronic disease  #Hx diarrhea  #Hx fecal incontinence  #S/P Abdominal Exploration w/ Lysis of complicated  Adhesions with an extended right colon resection in February 2018  #S/p abdominal lysis of adhesions, explant of infected mesh March 2022  #Change in eating patterns  #Troponin elevation, resolved  #Hypokalemia  #Hypomagnesemia  #Type 2 diabetes mellitus, not on insulin, without complication  #Hypothyroidism  #Chronic pain  #Severe hypertrophic vertebral body spondylosis of the lumbar spine  #Retrolisthesis of the lumbar spine  #COPD, not in acute exacerbation  #Nonrestorative sleep  #Restless Leg Syndrome    The patient was seen and examined on day of discharge and this discharge summary is in conjunction with any daily progress note from day of discharge. The patient is discharged in stable condition. Exam:     Vitals:  Vitals:    07/21/22 0637 07/21/22 0830 07/21/22 0835 07/21/22 1040   BP: 119/71 135/76     Pulse:  68     Resp:  18     Temp:  97.7 °F (36.5 °C)     TempSrc:  Oral     SpO2:  95% 93% 93%   Weight:       Height:         Weight: Weight: (!) 339 lb (153.8 kg)     24 hour intake/output:  Intake/Output Summary (Last 24 hours) at 7/21/2022 1206  Last data filed at 7/21/2022 1017  Gross per 24 hour   Intake 760 ml   Output 1200 ml   Net -440 ml       General appearance: This is a middle aged male sitting comfortably in a recliner in no acute distress, eating breakfast  Eyes:  Pupils equal, round, and reactive to light. Conjunctivae/corneas clear. HENT: Head normal in appearance. External nares normal.  Oral mucosa moist without lesions. Hearing grossly intact. Neck: Supple, with full range of motion. Trachea midline. No gross JVD appreciated. Respiratory:  Normal respiratory effort. Clear to auscultation, bilaterally without rales or wheezes or rhonchi. Maintaining adequate on oxygenation on 2L oxygen via nasal cannula  Cardiovascular: Normal rate, regular rhythm with normal S1/S2 without murmurs. No lower extremity edema. Abdomen: Soft, non-tender, non-distended with normal bowel sounds. Musculoskeletal: There is no joint swelling or tenderness. Normal tone. No abnormal movements. Skin: Warm and dry. No rashes or lesions. Neurologic:  No focal sensory/motor deficits in the upper and lower extremities. Cranial nerves:  grossly non-focal 2-12. Psychiatric: Alert and oriented, normal insight and thought content. Capillary Refill: Brisk,< 3 seconds. Peripheral Pulses: +2 palpable, equal bilaterally. Labs: For convenience and continuity at follow-up the following most recent labs are provided:    CBC:    Lab Results   Component Value Date/Time    WBC 5.1 07/20/2022 06:57 AM    HGB 7.3 07/20/2022 09:33 AM    HCT 24.2 07/20/2022 09:33 AM     07/20/2022 06:57 AM       Renal:    Lab Results   Component Value Date/Time     07/21/2022 04:15 AM    K 3.4 07/21/2022 04:15 AM    K 3.1 07/20/2022 06:57 AM     07/21/2022 04:15 AM    CO2 24 07/21/2022 04:15 AM    BUN 44 07/21/2022 04:15 AM    CREATININE 3.3 07/21/2022 04:15 AM    CALCIUM 8.6 07/21/2022 04:15 AM    PHOS 4.1 03/30/2022 05:14 AM         Significant Diagnostic Studies    Radiology:   XR CHEST PORTABLE   Final Result   1. Moderate hepatomegaly. A CARDIOMEMS device is present, projected over left hilar region. 2. Moderate interstitial pneumonia/edema involving both lungs relatively diffusely. Overall appearance slightly worse than prior. **This report has been created using voice recognition software. It may contain minor errors which are inherent in voice recognition technology. **      Final report electronically signed by Dr. Marisa Cormier on 7/19/2022 11:04 AM             Consults:     None    Disposition: Home  Condition at Discharge: Stable    Code Status:  Full Code     Patient Instructions:    Discharge lab work: Activity: activity as tolerated  Diet: ADULT DIET; Regular; Low Sodium (2 gm); Less than 60 gm; 2000 ml      Follow-up visits:   MD Ricardo Ramirez [address] [place] [address] on 7/22/2022      [address] [place] [address] [facility] Failure [facility]  [address] [phone]  Schedule an appointment as soon as possible for a visit in 1 week(s)           Discharge Medications:        Medication List        START taking these medications      oxyCODONE-acetaminophen  MG per tablet  Commonly known as: Percocet  Take 1 tablet by mouth every 8 hours as needed for Pain for up to 30 days. Intended supply: 30 days            CONTINUE taking these medications      albuterol sulfate  (90 Base) MCG/ACT inhaler  Commonly known as: PROVENTIL;VENTOLIN;PROAIR  Inhale 2 puffs into the lungs every 6 hours as needed for Wheezing or Shortness of Breath     amitriptyline 10 MG tablet  Commonly known as: ELAVIL     amLODIPine 10 MG tablet  Commonly known as: NORVASC  Take 1 tablet by mouth daily     aspirin EC 81 MG EC tablet     azelastine 0.1 % nasal spray  Commonly known as: ASTELIN     carvedilol 25 MG tablet  Commonly known as: Coreg  Take 2 tablets by mouth 2 times daily     CPAP Machine Misc  by Does not apply route Please change bipap to 18/14 cm H20.     doxepin 25 MG capsule  Commonly known as: SINEQUAN  take 2 capsules by mouth at bedtime     gabapentin 300 MG capsule  Commonly known as: NEURONTIN  Take 1 capsule by mouth nightly for 30 days.   Start taking on: July 22, 2022     glimepiride 4 MG tablet  Commonly known as: AMARYL     hydrALAZINE 25 MG tablet  Commonly known as: APRESOLINE  Take 1 tablet by mouth every 8 hours     levothyroxine 175 MCG tablet  Commonly known as: SYNTHROID  Take 1 tablet by mouth Daily     melatonin 3 MG Tabs tablet  Take 1.5 tablets by mouth nightly as needed (Sleep) montelukast 10 MG tablet  Commonly known as: SINGULAIR  Take 1 tablet by mouth nightly     nitroGLYCERIN 0.4 MG SL tablet  Commonly known as: Nitrostat  Place 1 tablet under the tongue every 5 minutes as needed for Chest pain (up to 3 doses)     oxybutynin 5 MG extended release tablet  Commonly known as: DITROPAN-XL  Take 1 tablet by mouth at bedtime     potassium chloride 10 MEQ extended release tablet  Commonly known as: K-Tab  Take 1 tablet by mouth daily     rOPINIRole 1 MG tablet  Commonly known as: REQUIP  Take 1 pill in afternoon and 2 at bedtime     tamsulosin 0.4 MG capsule  Commonly known as: Flomax  Take 1 capsule by mouth nightly            ASK your doctor about these medications      Breo Ellipta 200-25 MCG/INH Aepb inhaler  Generic drug: Fluticasone furoate-vilanterol  Inhale 1 puff into the lungs daily     bumetanide 1 MG tablet  Commonly known as: BUMEX  Take 2 tablets by mouth daily AND 1 tablet as needed. ipratropium-albuterol 0.5-2.5 (3) MG/3ML Soln nebulizer solution  Commonly known as: DUONEB  Inhale 3 mLs into the lungs every 4 hours as needed for Shortness of Breath               Where to Get Your Medications        These medications were sent to [facility] [address] [facility] [address]      Phone: [phone]   gabapentin 300 MG capsule  oxyCODONE-acetaminophen  MG per tablet                  Time Spent on discharge is 35 minutes in the examination, evaluation, counseling and review of medications and discharge plan. Thank you Eliazar Steele DO for the opportunity to be involved in this patient's care.       Signed:    Electronically signed by Rocio Perez MD IM RESIDENT on 7/21/22 at 12:06 PM EDT     Case was discussed with Attending, Dr. Misty Pedroza

## 2022-07-21 NOTE — PROGRESS NOTES
A home oxygen evaluation has been completed. [x]Patient is an inpatient. It is expected that the patient will be discharged within the next 48 hours. Qualified provider to write order for home prescription if patient qualifies. Social service/care managers will arrange for home oxygen. If patient is active, arrange for Home Medical supplier to assess for Oxygen Conserving Device per pulse oximetry. []Patient is an outpatient. Results will be faxed to the ordering provider. Qualified provider to write order for home prescription if patient qualifies and arranges for home oxygen. Patient was placed on room air for 5 minutes. SpO2 was 76 % on room air at rest. Patients SpO2 was below 89% and qualified for home oxygen. Oxygen was applied at 2 lpm via nasal cannula to maintain a SpO2 between 90-92% while at rest. Actual SpO2 was 93 %. Patient can ambulate for exercise flow rate. Patients was ambulated, SpO2 was 92-93% on 6 lpm to maintain SpO2 between 90-92% while exercising. If oxygen need is greater than 4 lpm the SpO2 on 4 lpm was 86%. Note: For any SpO2 at 56% see policy and procedure for possible qualifications.

## 2022-07-21 NOTE — ADDENDUM NOTE
Encounter addended by: Danyelle Diamond MD on: 7/21/2022 2:33 PM   Actions taken: Problem List modified

## 2022-07-22 ENCOUNTER — OFFICE VISIT (OUTPATIENT)
Dept: NEPHROLOGY | Age: 62
End: 2022-07-22
Payer: MEDICARE

## 2022-07-22 VITALS
TEMPERATURE: 98 F | SYSTOLIC BLOOD PRESSURE: 157 MMHG | OXYGEN SATURATION: 97 % | DIASTOLIC BLOOD PRESSURE: 86 MMHG | BODY MASS INDEX: 44.07 KG/M2 | HEART RATE: 70 BPM | WEIGHT: 315 LBS

## 2022-07-22 DIAGNOSIS — N18.4 CKD (CHRONIC KIDNEY DISEASE), STAGE IV (HCC): Primary | ICD-10-CM

## 2022-07-22 DIAGNOSIS — N18.4 ANEMIA IN STAGE 4 CHRONIC KIDNEY DISEASE (HCC): Primary | ICD-10-CM

## 2022-07-22 DIAGNOSIS — D63.1 ANEMIA IN STAGE 4 CHRONIC KIDNEY DISEASE (HCC): ICD-10-CM

## 2022-07-22 DIAGNOSIS — N18.4 ANEMIA IN STAGE 4 CHRONIC KIDNEY DISEASE (HCC): ICD-10-CM

## 2022-07-22 DIAGNOSIS — D63.1 ANEMIA IN STAGE 4 CHRONIC KIDNEY DISEASE (HCC): Primary | ICD-10-CM

## 2022-07-22 DIAGNOSIS — I12.9 HYPERTENSIVE RENAL DISEASE, STAGE 1 THROUGH STAGE 4 OR UNSPECIFIED CHRONIC KIDNEY DISEASE: ICD-10-CM

## 2022-07-22 PROCEDURE — 1111F DSCHRG MED/CURRENT MED MERGE: CPT | Performed by: INTERNAL MEDICINE

## 2022-07-22 PROCEDURE — 3017F COLORECTAL CA SCREEN DOC REV: CPT | Performed by: INTERNAL MEDICINE

## 2022-07-22 PROCEDURE — 4004F PT TOBACCO SCREEN RCVD TLK: CPT | Performed by: INTERNAL MEDICINE

## 2022-07-22 PROCEDURE — G8427 DOCREV CUR MEDS BY ELIG CLIN: HCPCS | Performed by: INTERNAL MEDICINE

## 2022-07-22 PROCEDURE — G8417 CALC BMI ABV UP PARAM F/U: HCPCS | Performed by: INTERNAL MEDICINE

## 2022-07-22 PROCEDURE — 99213 OFFICE O/P EST LOW 20 MIN: CPT | Performed by: INTERNAL MEDICINE

## 2022-07-22 RX ORDER — POTASSIUM CHLORIDE 750 MG/1
20 TABLET, FILM COATED, EXTENDED RELEASE ORAL DAILY
Qty: 60 TABLET | Refills: 3 | Status: SHIPPED
Start: 2022-07-22 | End: 2022-08-17

## 2022-07-22 RX ORDER — BUMETANIDE 1 MG/1
1 TABLET ORAL 2 TIMES DAILY
Qty: 60 TABLET | Refills: 2
Start: 2022-07-22 | End: 2022-08-17

## 2022-07-22 NOTE — PROGRESS NOTES
[facility]    [facility], [address]   [place], [address]  [phone]  Progress Note  7/22/2022 11:07 AM        Pt Name:    Mckayla Delcid  YOB: 1960  Primary Care Physician:  Jhon Huber DO     Chief Complaint:   Chief Complaint   Patient presents with    Follow-up: HTN, CKD  . Background Information/Interval History:   63 male with HTN, hernia surgeries, obesity, DM, chronic constipation, thyroid disorder, BPH, hx abd exp surgery with JENNY due to recurrent small bowel obstructions, chronic loose stools, chronic hypokalemia secondary to GI losses. Has cardioMEMS. Has been following with CHF clinic. Was admitted to the hospital three days ago with shortness of breath and B/L lower extremity swelling. Pt follows with heart failure clinic on regular basis. CXR showed pulmonary edema. Per discharge summary he was given 2 doses of IV lasix. He was asked by the hospitalist team to see me as outpatient. Currently on 4L oxygen. He says he is more short of breath since discharge. He says his hemoglobin was low in the hospital. It was 7.3 on discharge. His K is 3.4, he is only taking K-dur 10 meq daily. Past History:  Past Medical History:   Diagnosis Date    Arthritis     Back problem     back pain-sees [facility] pain mgmt    Bladder disease     Dr. Argueta    CHF with [facility] LVEF [facility] 05/24/2021    Dr. George/CHF clinic    Constipation     Diabetes mellitus ([facility]     Dr. Ayaan Cerrato    Hypertension     Hypertensive emergency 4/11/2019    Hypertensive urgency 4/13/2019    Sleep apnea     has bipap-sees AZRA Jaeger CNP    Thyroid disease      Past Surgical History:   Procedure Laterality Date    ABDOMEN SURGERY      ABDOMEN SURGERY N/A 3/25/2022    ABDOMINAL LYSIS OF ADHESIONS, EXPLANT OF INFECTED MESH performed by Roselyn Lind MD at [address]      no stents    CARPAL TUNNEL RELEASE Bilateral     COLONOSCOPY  2017    Dr. Dorota Miranda DILATATION, ESOPHAGUS      HERNIA REPAIR      x3 surgeries with 14 repairs    KNEE SURGERY Right 1980's    cartilage    NH EXPLORATORY OF ABDOMEN N/A 2/5/2018    ABDOMINAL EXPLORATION WITH LYSIS OF COMPLICATED ADHESIONS WITH AN EXTENDED RIGHT COLON RESECTION performed by Femi Jacques MD at [address]:  BP (!) 157/86 (Site: Left Upper Arm, Position: Sitting, Cuff Size: Large Adult)   Pulse 70   Temp 98 °F (36.7 °C)   Wt (!) 334 lb (151.5 kg)   SpO2 97%   BMI 44.07 kg/m²   Wt Readings from Last 3 Encounters:   07/22/22 (!) 334 lb (151.5 kg)   07/19/22 (!) 339 lb (153.8 kg)   07/07/22 (!) 332 lb 6.4 oz (150.8 kg)     Body mass index is 44.07 kg/m². General Appearance: alert and cooperative with exam, appears comfortable, no distress  Neck: No jugular venous distention  Lungs: Air entry B/L, no crackles or rales, no use of accessory muscles  Heart: S1, S2 heard  GI: soft, non-tender, no guarding, obese  Extremities: B/L 2+ edema     Medications:  Current Outpatient Medications   Medication Sig Dispense Refill    gabapentin (NEURONTIN) 300 MG capsule Take 1 capsule by mouth nightly for 30 days. 30 capsule 0    oxyCODONE-acetaminophen (PERCOCET)  MG per tablet Take 1 tablet by mouth every 8 hours as needed for Pain for up to 30 days. Intended supply: 30 days 90 tablet 0    doxepin (SINEQUAN) 25 MG capsule take 2 capsules by mouth at bedtime 60 capsule 3    CPAP Machine MISC by Does not apply route Please change bipap to 18/14 cm H20. 1 each 0    carvedilol (COREG) 25 MG tablet Take 2 tablets by mouth 2 times daily 120 tablet 5    oxybutynin (DITROPAN-XL) 5 MG extended release tablet Take 1 tablet by mouth at bedtime 30 tablet 0    amitriptyline (ELAVIL) 10 MG tablet Take 10 mg by mouth 2 times daily      bumetanide (BUMEX) 1 MG tablet Take 2 tablets by mouth daily AND 1 tablet as needed.  (Patient taking differently: Take 1 mg by mouth in the morning.) 60 tablet 6    potassium chloride (K-TAB) 10 MEQ extended release tablet Take 1 tablet by mouth daily 60 tablet 3    tamsulosin (FLOMAX) 0.4 MG capsule Take 1 capsule by mouth nightly 90 capsule 3    hydrALAZINE (APRESOLINE) 25 MG tablet Take 1 tablet by mouth every 8 hours 90 tablet 3    levothyroxine (SYNTHROID) 175 MCG tablet Take 1 tablet by mouth Daily 30 tablet 3    amLODIPine (NORVASC) 10 MG tablet Take 1 tablet by mouth daily 30 tablet 3    melatonin 3 MG TABS tablet Take 1.5 tablets by mouth nightly as needed (Sleep) 45 tablet 3    ipratropium-albuterol (DUONEB) 0.5-2.5 (3) MG/3ML SOLN nebulizer solution Inhale 3 mLs into the lungs every 4 hours as needed for Shortness of Breath (Patient taking differently: Inhale 1 vial into the lungs every 4 hours as needed for Shortness of Breath Usually uses at night) 360 mL 1    montelukast (SINGULAIR) 10 MG tablet Take 1 tablet by mouth nightly 90 tablet 3    albuterol sulfate  (90 Base) MCG/ACT inhaler Inhale 2 puffs into the lungs every 6 hours as needed for Wheezing or Shortness of Breath 1 each 5    aspirin EC 81 MG EC tablet Take 1 tablet by mouth daily 90 tablet 1    nitroGLYCERIN (NITROSTAT) 0.4 MG SL tablet Place 1 tablet under the tongue every 5 minutes as needed for Chest pain (up to 3 doses) 25 tablet 3    glimepiride (AMARYL) 4 MG tablet Take 4 mg by mouth daily       azelastine (ASTELIN) 137 MCG/SPRAY nasal spray 1 spray by Nasal route as needed. Use in each nostril as directed      Fluticasone furoate-vilanterol (BREO ELLIPTA) 200-25 MCG/INH AEPB inhaler Inhale 1 puff into the lungs daily (Patient not taking: Reported on 7/22/2022) 3 each 3    rOPINIRole (REQUIP) 1 MG tablet Take 1 pill in afternoon and 2 at bedtime (Patient not taking: Reported on 7/22/2022) 270 tablet 1     No current facility-administered medications for this visit.         Laboratory & Diagnostics:  Feb 2018: R 14.3, L 15.4, Two simple renal cysts  K 4.5, Creat 1.3   June 2018: K 2.8, creat 1.0, Aldosterone 31, Renin 0.5  Sept 2018: K 3.3  Dec 2018: K 4.3, Creat 1.0, Mg 2.0  April EF 40-45%  June 2019: K 4.5, creat 1.3, Mg 1.9, UPCR 610 mg/g  Aug 2019: K 4.4, Creat 1.0, Mg 2.1    Jan 2020: K 4.2, Creat 1.0, UPCR 240 mg/g  July 2020: K 4.2, Creat 0.9  July 2021: K 4.9, creat 1.2, Mg 2.1, UPCR 100 mg/g    April 2022: K 3.5, Creat 2.1, ca 8.3  May 2022: Creat 3.2  July 2022: Creat 3.3, K 3.4, Hb 7.3     Impression/Plan:   1. Progressive CKD IV:  He has about 2.5 grams of proteinuria likely due to DM and obesity possibly post adaptive FSGS. Creat is 3.3, I had long discussion with patient regarding his kidney function status. He understands he is likely going to need dialysis in near future if renal function continues to worsen. He is fluid overloaded and needs diuretics. He understands there is very high chance that creat will increase as we optimize his fluid status. He understands and he agrees that he will need to accept some degree of renal dysfunction to keep him out of CHF. He understand if needs dialysis he will take it. For now need to go back on diuretics. Resume bumex 1 mg BID and titrate upward as needed. 2. HTN: on hydralazine/norvasc and coreg. 3. Chronic/HFpEF Cardio MEMS: keep entresto on hold for now. Resume bumex 1 mg BID. Call me next week with some weights and BP. Check BMP next week. See him back in office in 2 weeks. Adjust diuretics as needed. 4. Anemia in CKD: check iron studies. Add aranesp 100 mcg SQ  5. Hx colon surgery/colon resection (Feb 2017): chronic diarrhea  6. Obesity: strongly advised weight loss and lifestyle modification  7. B/L renal simple cysts: US done in Feb 2018  8. DM with proteinuria  9. Sleep apnea: on CPAP machine    Orders Placed This Encounter   Procedures    Basic Metabolic Panel    Hemoglobin and Hematocrit       Return in about 2 weeks (around 8/5/2022).     Gia Mccall MD  [facility]

## 2022-07-25 ENCOUNTER — HOSPITAL ENCOUNTER (OUTPATIENT)
Dept: GENERAL RADIOLOGY | Age: 62
Discharge: HOME | End: 2022-07-25
Payer: MEDICARE

## 2022-07-25 VITALS
SYSTOLIC BLOOD PRESSURE: 143 MMHG | DIASTOLIC BLOOD PRESSURE: 68 MMHG | RESPIRATION RATE: 16 BRPM | WEIGHT: 315 LBS | BODY MASS INDEX: 41.75 KG/M2 | OXYGEN SATURATION: 97 % | HEART RATE: 70 BPM | HEIGHT: 73 IN | TEMPERATURE: 97.4 F

## 2022-07-25 DIAGNOSIS — D63.1 ANEMIA OF CHRONIC RENAL FAILURE, STAGE 4 (SEVERE) (HCC): ICD-10-CM

## 2022-07-25 DIAGNOSIS — N18.4 CHRONIC KIDNEY DISEASE, STAGE IV (SEVERE) (HCC): Primary | ICD-10-CM

## 2022-07-25 DIAGNOSIS — N18.4 ANEMIA OF CHRONIC RENAL FAILURE, STAGE 4 (SEVERE) (HCC): ICD-10-CM

## 2022-07-25 PROCEDURE — 96372 THER/PROPH/DIAG INJ SC/IM: CPT

## 2022-07-25 PROCEDURE — 6360000002 HC RX W HCPCS: Performed by: INTERNAL MEDICINE

## 2022-07-25 RX ORDER — ROPINIROLE 0.5 MG/1
TABLET, FILM COATED ORAL
Qty: 90 TABLET | Refills: 2 | Status: SHIPPED | OUTPATIENT
Start: 2022-07-25 | End: 2022-11-01

## 2022-07-25 RX ADMIN — EPOETIN ALFA-EPBX 10000 UNITS: 10000 INJECTION, SOLUTION INTRAVENOUS; SUBCUTANEOUS at 10:25

## 2022-07-25 ASSESSMENT — PAIN DESCRIPTION - FREQUENCY: FREQUENCY: CONTINUOUS

## 2022-07-25 ASSESSMENT — PAIN DESCRIPTION - DESCRIPTORS: DESCRIPTORS: ACHING

## 2022-07-25 ASSESSMENT — PAIN DESCRIPTION - PAIN TYPE: TYPE: CHRONIC PAIN

## 2022-07-25 ASSESSMENT — PAIN DESCRIPTION - LOCATION: LOCATION: BACK

## 2022-07-25 NOTE — TELEPHONE ENCOUNTER
Received refill request for Requip. Medication was last ordered by Aida Rincon . Medication was last ordered on 12/28/21 with 1 refills. Patient was last seen in the office 5/31/22. Patient has a scheduled follow up 10/3/22. Medication needs to be sent to [facility].

## 2022-07-25 NOTE — PROGRESS NOTES
1015 Pt to room via ambulation. Uses walker and portable O2 2L NC. Pt is A & O x 3. Skin is warm and dry and pale. Assessment completed. Dr. Alden Hughes called to clarify Jahu 80 redraw. Per Dr. Alden Hughes, pt is to have weekly prior to Retacrit injection. Today's injection is based off H & H from 7/20/22. Pt voiced understanding and will be returning to [facility] next Monday 8/1/22 for blood draw and injection. 1025 Parameters met to give retacrit-see MAR for details. 1045 Denies adverse reaction. Injection site is clean, dry and no edema noted. Reviewed discharge instructions and voiced understanding. VS WNL.      1050 To lobby via ambulation

## 2022-07-25 NOTE — DISCHARGE INSTRUCTIONS
epoetin justin  Pronunciation: e NEERU e tin AL fa  Brand: Epogen, Procrit, Retacrit  What is the most important information I should know about epoetin justin? This medicine can cause serious side effects, including heart attack or stroke. Epoetin justin may also speed up tumor growth, or shorten remission or survival time in some people. Talk with your doctor about the risks and benefits of using epoetin justin. You should not use this medicine if you have uncontrolled high blood pressure, or if you have ever had pure red cell aplasia (PRCA, a type of anemia) causedby using epoetin justin or darbepoetin justin. Call your doctor at once if you have signs of a blood clot: sudden numbness or weakness, problems with vision or speech, chest pain,trouble breathing, pain or cold feeling in an arm or leg. What is epoetin justin? Epoetin justin is a man-made form of a protein that helps your body produce red blood cells. This protein may be reduced when you have kidney failure or use certain medications. When fewer red blood cells are produced, you can develop acondition called anemia. Epoetin justin is used to treat anemia caused by chemotherapy in adults andchildren at least 11years old. Epoetin justin is also used to treat anemia caused by chronic kidney disease inadults and children at least 2 month old. Epoetin justin is also used to treat anemia in adults taking zidovudine to treatHIV (human immunodeficiency virus). Epoetin justin is also used to reduce the need for red blood cell transfusions inadults having certain types of surgery. Epoetin justin may also be used for purposes not listed in this medication guide. What should I discuss with my healthcare provider before using epoetin justin?   You should not use this medication if you are allergic to epoetin justin ordarbepoetin justin, or if:  you have untreated or uncontrolled high blood pressure;  you have had pure red cell aplasia (PRCA, a type of anemia) after using darbepoetin justin or epoetin justin; or  you use an epoetin justin multi-dose vial and you are pregnant or breastfeeding. Do not use epoetin justin from a multi-dose vial when giving medicine to a baby. The multi-dose vial contains an ingredient that can cause serious sideeffects or death in very young infants or premature babies. Epoetin justin may speed up tumor growth, or shorten remission or survival time in some people with certain types of cancer. Talk with your doctor about the risks and benefits of using epoetin justin. Tell your doctor if you have ever had:  heart disease, high blood pressure;  a heart attack, stroke, or blood clot;  a seizure disorder;  phenylketonuria (PKU); or  kidney disease (or if you are on dialysis). It is not known whether this medicine will harm an unborn baby. Tell yourdoctor if you are pregnant or plan to become pregnant. Do not breastfeed while using this medicine, and for at least 2 months after your last dose. Do not use epoetin justin from a multi-dose vial if you are pregnant or breastfeeding. Epoetin justin is made from donated human plasma and may contain viruses or other infectious agents. Donated plasma is tested and treated to reduce the risk of contamination, but there is still a small possibility it could transmitdisease. Ask your doctor about any possible risk. How should I use epoetin justin? Follow all directions on your prescription label and read all medication guides or instruction sheets. Your doctor may occasionally change your dose. Use themedicine exactly as directed. Epoetin justin is injected under the skin, or as an infusion into a vein. A healthcare provider may teach you how to properly use the medication byyourself. Read and carefully follow any Instructions for Use provided with your medicine. Do not use epoetin justin if you don't understand all instructions for properuse. Ask your doctor or pharmacist if you have questions.   Prepare your injection only when you are ready to give it. Do not use if the medicine has changed colors or has particles in it. Call your pharmacist for new medicine. Do not shake this medicine or you may ruin it. Call your doctor if you feel weak, tired, or light-headed. These may be signsthat your body has stopped responding to epoetin justin. You may need frequent medical tests to be sure this medicine is not causingharmful effects. Your injections may be delayed based on the results. You may be given other medications to help prevent serious side effects. Carmel Caul these medicines for as long as your doctor has prescribed. If you need surgery, tell the surgeon ahead of time that you are using epoetinalfa. You may need to use a medicine to prevent blood clots. Epoetin justin is only part of a complete treatment program that may also includea special diet. Follow your doctor's instructions very closely. Store in the refrigerator and protect from light. Do not freeze epoetin justin, and throw away the medication if it has become frozen. Each single-use vial (bottle) of this medicine is for one use only. Throw it away after one use, even if there is still medicine left inside. Throw away any leftover medicine in a multi-dose vial 21 days after the first use. Use a needle and syringe only once and then place them in a puncture-proof \"sharps\" container. Follow state or local laws about how to dispose of thiscontainer. Keep it out of the reach of children and pets. What happens if I miss a dose? Call your doctor for instructions if you miss a dose of epoetin justin. What happens if I overdose? Seek emergency medical attention or call the Poison Help line at 1-761.940.9145. What should I avoid while using epoetin justin? Avoid driving or hazardous activity until you know how this medicine willaffect you. Your reactions could be impaired. What are the possible side effects of epoetin justin?   Get emergency medical help if you have signs of an allergic reaction (hives, sweating, rapid pulse, wheezing, trouble breathing, severe dizziness or fainting, swelling in your face or throat) or a severe skin reaction (fever, sore throat, burning eyes, skin pain, red or purple skin rash withblistering and peeling). Epoetin justin can cause serious side effects, including heart attack or stroke. Seek emergency medical help if you have:  heart attack symptoms --chest pain or pressure, pain spreading to your jaw or shoulder, nausea, sweating;  signs of a blood clot --pain, swelling, warmth, redness, cold feeling, or pale appearance of an arm or leg; or  signs of a stroke --sudden numbness or weakness (especially on one side of the body), sudden severe headache, slurred speech, problems with vision or balance. Call your doctor at once if you have:  unusual tiredness;  a seizure (convulsions);  high blood sugar --increased thirst, increased urination, dry mouth, fruity breath odor;  low potassium --leg cramps, constipation, irregular heartbeats, fluttering in your chest, increased thirst or urination, numbness or tingling, muscle weakness or limp feeling; or  increased blood pressure --severe headache, blurred vision, pounding in your neck or ears, anxiety, nosebleed. Common side effects may include:  fever, chills, cough, feeling short of breath;  low potassium, low white blood cells;  blood vessel blockage;  high blood sugar;  joint pain, bone pain, muscle pain or spasm;  itching or rash;  mouth pain, trouble swallowing;  nausea, vomiting;  headache, dizziness;  trouble sleeping;  depressed mood;  weight loss; or  pain or redness where the medicine was injected. This is not a complete list of side effects and others may occur. Call your doctor for medical advice about side effects. You may report side effects toFDA at 8-462-QOH-8064. What other drugs will affect epoetin justin?   Other drugs may affect epoetin justin, including prescription and over-the-counter medicines, vitamins, and herbal products. Tell your doctorabout all your current medicines and any medicine you start or stop using. Where can I get more information? Your pharmacist can provide more information about epoetin justin. Remember, keep this and all other medicines out of the reach of children, never share your medicines with others, and use this medication only for the indication prescribed. Every effort has been made to ensure that the information provided by [address] [place] [address]  is accurate, up-to-date, and complete, but no guarantee is made to that effect. Drug information contained herein may be time sensitive. [facility] information has been compiled for use by healthcare practitioners and consumers in the [place] and therefore [facility] does not warrant that uses outside of the [place] are appropriate, unless specifically indicated otherwise. [facility]'s drug information does not endorse drugs, diagnose patients or recommend therapy. [facility]'s drug information is an informational resource designed to assist licensed healthcare practitioners in caring for their patients and/or to serve consumers viewing this service as a supplement to, and not a substitute for, the expertise, skill, knowledge and judgment of healthcare practitioners. The absence of a warning for a given drug or drug combination in no way should be construed to indicate that the drug or drug combination is safe, effective or appropriate for any given patient. [facility] does not assume any responsibility for any aspect of healthcare administered with the aid of information [facility] provides. The information contained herein is not intended to cover all possible uses, directions, precautions, warnings, drug interactions, allergic reactions, or adverse effects. If you have questions about the drugs you are taking, check with yourdoctor, nurse or pharmacist.  Copyright 9979-0714 Nessa [address] [place] [address] Avenue: 12.01.  Revision date:11/23/2020. Care instructions adapted under license by [facility] ([facility]). If you have questions about a medical condition or this instruction, always ask your healthcare professional. [address] any warranty or liability for your use of this information.

## 2022-07-27 ENCOUNTER — OFFICE VISIT (OUTPATIENT)
Dept: PHYSICAL MEDICINE AND REHAB | Age: 62
End: 2022-07-27
Payer: MEDICARE

## 2022-07-27 VITALS — SYSTOLIC BLOOD PRESSURE: 138 MMHG | DIASTOLIC BLOOD PRESSURE: 78 MMHG

## 2022-07-27 DIAGNOSIS — I50.32 CHF (CONGESTIVE HEART FAILURE), NYHA CLASS II, CHRONIC, DIASTOLIC (HCC): ICD-10-CM

## 2022-07-27 DIAGNOSIS — G62.9 NEUROPATHY: ICD-10-CM

## 2022-07-27 DIAGNOSIS — N18.4 STAGE 4 CHRONIC KIDNEY DISEASE (HCC): ICD-10-CM

## 2022-07-27 DIAGNOSIS — M25.561 CHRONIC PAIN OF RIGHT KNEE: ICD-10-CM

## 2022-07-27 DIAGNOSIS — G89.4 CHRONIC PAIN SYNDROME: ICD-10-CM

## 2022-07-27 DIAGNOSIS — M48.062 SPINAL STENOSIS OF LUMBAR REGION WITH NEUROGENIC CLAUDICATION: ICD-10-CM

## 2022-07-27 DIAGNOSIS — M17.11 PRIMARY OSTEOARTHRITIS OF RIGHT KNEE: ICD-10-CM

## 2022-07-27 DIAGNOSIS — M54.17 LUMBOSACRAL RADICULITIS: ICD-10-CM

## 2022-07-27 DIAGNOSIS — F11.90 CHRONIC, CONTINUOUS USE OF OPIOIDS: ICD-10-CM

## 2022-07-27 DIAGNOSIS — N18.4 CHRONIC KIDNEY DISEASE, STAGE IV (SEVERE) (HCC): ICD-10-CM

## 2022-07-27 DIAGNOSIS — G89.29 CHRONIC PAIN OF RIGHT KNEE: ICD-10-CM

## 2022-07-27 DIAGNOSIS — M47.816 LUMBAR FACET ARTHROPATHY: ICD-10-CM

## 2022-07-27 DIAGNOSIS — M47.816 SPONDYLOSIS OF LUMBAR REGION WITHOUT MYELOPATHY OR RADICULOPATHY: Primary | ICD-10-CM

## 2022-07-27 PROCEDURE — 99214 OFFICE O/P EST MOD 30 MIN: CPT | Performed by: NURSE PRACTITIONER

## 2022-07-27 RX ORDER — NALOXONE HYDROCHLORIDE 4 MG/.1ML
1 SPRAY NASAL PRN
Qty: 1 EACH | Refills: 0 | Status: SHIPPED | OUTPATIENT
Start: 2022-07-27

## 2022-07-27 ASSESSMENT — ENCOUNTER SYMPTOMS
SHORTNESS OF BREATH: 1
ABDOMINAL PAIN: 1
COUGH: 0
STRIDOR: 0
BACK PAIN: 1
CHEST TIGHTNESS: 1
WHEEZING: 0

## 2022-07-27 NOTE — PROGRESS NOTES
Braden receiving pain medications from other sources. Had 1 ER visit since last visit     Pain scale with out pain medications or at its worst is 8-9/10. Pain scale with pain medications or at its best is 5-6/10. Last dose of Percocet and Neurontin was last night  Drug screen reviewed from 4/13/2022 and was appropriate  Pill count completed  today and WNL: Yes      The patientis allergic to shellfish-derived products, pcn [penicillins], succinylcholine, sulfa antibiotics, morphine, and tape [adhesive tape]. Subjective:      Review of Systems   Constitutional:  Positive for appetite change and fatigue. Negative for chills and fever. Respiratory:  Positive for chest tightness and shortness of breath. Negative for cough, wheezing and stridor. On oxygen per nasal cannula 4 liters today     Cardiovascular:  Positive for leg swelling. Negative for chest pain. Gastrointestinal:  Positive for abdominal pain. Incision healed   Musculoskeletal:  Positive for arthralgias, back pain, gait problem, joint swelling, myalgias and neck stiffness. Negative for neck pain. Ambulating with quad cane    Skin: Negative. Neurological:  Positive for weakness and numbness. Psychiatric/Behavioral:  Positive for dysphoric mood and sleep disturbance. The patient is not nervous/anxious. Objective:     Vitals:    07/27/22 0848   BP: 138/78   Site: Left Upper Arm   Position: Sitting       Physical Exam  Constitutional:       General: He is not in acute distress. Appearance: Normal appearance. He is obese. He is not ill-appearing, toxic-appearing or diaphoretic. HENT:      Head: Normocephalic and atraumatic. Right Ear: External ear normal. There is no impacted cerumen. Left Ear: External ear normal. There is no impacted cerumen. Nose: Nose normal. No congestion or rhinorrhea. Mouth/Throat:      Mouth: Mucous membranes are moist.      Pharynx: Oropharynx is clear.    Eyes: Extraocular Movements: Extraocular movements intact. Conjunctiva/sclera: Conjunctivae normal.      Pupils: Pupils are equal, round, and reactive to light. Cardiovascular:      Rate and Rhythm: Normal rate and regular rhythm. Pulses: Normal pulses. Heart sounds: Murmur heard. Pulmonary:      Effort: Pulmonary effort is normal. No respiratory distress. Breath sounds: Normal breath sounds. Comments: On 4 liters of oxygen per nasal cannula, diminished   Abdominal:      General: Abdomen is flat. Bowel sounds are normal. There is no distension. Palpations: Abdomen is soft. There is no mass. Tenderness: There is no abdominal tenderness. Hernia: No hernia is present. Musculoskeletal:         General: Tenderness present. Right shoulder: Tenderness present. Decreased range of motion. Right wrist: Tenderness present. Decreased range of motion. Left wrist: Tenderness present. Decreased range of motion. Cervical back: Neck supple. Tenderness and bony tenderness present. Muscular tenderness present. Thoracic back: Bony tenderness present. Decreased range of motion. Lumbar back: Tenderness and bony tenderness present. Decreased range of motion. Positive right straight leg raise test and positive left straight leg raise test.        Back:       Right hip: Decreased range of motion. Decreased strength. Left hip: Decreased range of motion. Decreased strength. Right knee: Swelling present. Decreased range of motion. Tenderness present. Right lower leg: Edema present. Left lower leg: Edema present. Right ankle: Swelling present. Tenderness present. Decreased range of motion. Left ankle: Tenderness present. Skin:     General: Skin is warm and dry. Capillary Refill: Capillary refill takes less than 2 seconds. Neurological:      General: No focal deficit present.       Mental Status: He is alert and oriented to person, place, and time. Sensory: Sensory deficit present. Motor: Weakness present. Coordination: Romberg sign positive. Coordination abnormal.      Gait: Gait abnormal.      Deep Tendon Reflexes:      Reflex Scores:       Tricep reflexes are 2+ on the right side and 2+ on the left side. Bicep reflexes are 2+ on the right side and 2+ on the left side. Brachioradialis reflexes are 2+ on the right side and 2+ on the left side. Patellar reflexes are 1+ on the right side and 1+ on the left side. Achilles reflexes are 1+ on the right side and 1+ on the left side. Comments: 4/5/strength bilateral lower extremity    Decreased sensation bilateral feet    Psychiatric:         Mood and Affect: Mood normal.         Behavior: Behavior normal.     SAUNDRA  Patricks test  positive  Yeoman's  or Gaenslen's positive         Assessment:     1. 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 Spinal stenosis of lumbar region with neurogenic claudication    3. Lumbar facet arthropathy    4. Lumbosacral radiculitis    5. Primary osteoarthritis of right knee    6. Chronic pain of right knee    7. Neuropathy    8. Chronic pain syndrome    9. Chronic, continuous use of opioids    10. CHF (congestive heart failure), NYHA class II, chronic, diastolic ([address]    11. Chronic kidney disease, stage IV (severe) ([address]    12. Stage 4 chronic kidney disease ([address]            Plan:      OARRS reviewed. Current MED: 45.00  Patient was offered naloxone for home. Discussed long term side effects of medications, tolerance, dependency and addiction. Previous UDS reviewed  UDS preformed today for compliance. Patient told can not receive any pain medications from any other source. No evidence of abuse, diversion or aberrant behavior.   Medications and/or procedures to improve function and quality of life- patient understanding with this and that may not be pain free  Discussed with patient about safe storage of medications at home  Discussed possible weaning of medication dosing dependent on treatment/procedure results. Discussed with patient about risks with procedure including infection, reaction to medication, increased pain, or bleeding. Not a candidate for procedures at this time d/t medical issues. Discussed procedures when stable L-facet MBB, LESI, right knee injection   Continue pain medications Percocet 10/325 TID prn- filled 7/21/2022, Neurontin 300 mg at bedtime- filled 7/22/2022. Meds. Prescribed:   No orders of the defined types were placed in this encounter. Return in about 10 weeks (around 10/5/2022), or if symptoms worsen or fail to improve, for follow up  for medications.                Electronically signed by SAHIL Cancino CNP on7/27/2022 at 9:03 AM

## 2022-08-01 ENCOUNTER — HOSPITAL ENCOUNTER (OUTPATIENT)
Dept: GENERAL RADIOLOGY | Age: 62
Discharge: HOME | End: 2022-08-01
Payer: MEDICARE

## 2022-08-01 ENCOUNTER — HOSPITAL ENCOUNTER (OUTPATIENT)
Age: 62
Discharge: HOME | End: 2022-08-01
Payer: MEDICARE

## 2022-08-01 ENCOUNTER — HOSPITAL ENCOUNTER (OUTPATIENT)
Age: 62
End: 2022-08-01

## 2022-08-01 VITALS
RESPIRATION RATE: 22 BRPM | DIASTOLIC BLOOD PRESSURE: 66 MMHG | TEMPERATURE: 96.8 F | SYSTOLIC BLOOD PRESSURE: 125 MMHG | OXYGEN SATURATION: 96 % | HEART RATE: 66 BPM

## 2022-08-01 DIAGNOSIS — N18.4 ANEMIA OF CHRONIC RENAL FAILURE, STAGE 4 (SEVERE) (HCC): ICD-10-CM

## 2022-08-01 DIAGNOSIS — N18.4 CHRONIC KIDNEY DISEASE, STAGE IV (SEVERE) (HCC): Primary | ICD-10-CM

## 2022-08-01 DIAGNOSIS — D63.1 ANEMIA OF CHRONIC RENAL FAILURE, STAGE 4 (SEVERE) (HCC): ICD-10-CM

## 2022-08-01 LAB
HCT VFR BLD CALC: 26.3 % (ref 42–52)
HEMOGLOBIN: 8.1 GM/DL (ref 14–18)
MCH RBC QN AUTO: 25.2 PG (ref 27–31)
MCHC RBC AUTO-ENTMCNC: 30.8 GM/DL (ref 33–37)
MCV RBC AUTO: 82 FL (ref 80–94)
PDW BLD-RTO: 17.8 % (ref 11.5–14.5)
PLATELET # BLD: 333 THOU/MM3 (ref 130–400)
PMV BLD AUTO: 7.7 FL (ref 7.4–10.4)
RBC # BLD: 3.22 MILL/MM3 (ref 4.7–6.1)
WBC # BLD: 6.9 THOU/MM3 (ref 4.8–10.8)

## 2022-08-01 PROCEDURE — 85027 COMPLETE CBC AUTOMATED: CPT

## 2022-08-01 PROCEDURE — 36415 COLL VENOUS BLD VENIPUNCTURE: CPT

## 2022-08-01 PROCEDURE — 6360000002 HC RX W HCPCS: Performed by: INTERNAL MEDICINE

## 2022-08-01 PROCEDURE — 96372 THER/PROPH/DIAG INJ SC/IM: CPT

## 2022-08-01 RX ADMIN — EPOETIN ALFA-EPBX 10000 UNITS: 10000 INJECTION, SOLUTION INTRAVENOUS; SUBCUTANEOUS at 11:27

## 2022-08-01 ASSESSMENT — PAIN SCALES - GENERAL: PAINLEVEL_OUTOF10: 5

## 2022-08-01 ASSESSMENT — PAIN DESCRIPTION - FREQUENCY: FREQUENCY: CONTINUOUS

## 2022-08-01 ASSESSMENT — PAIN DESCRIPTION - DESCRIPTORS: DESCRIPTORS: ACHING

## 2022-08-01 ASSESSMENT — PAIN DESCRIPTION - LOCATION: LOCATION: GENERALIZED

## 2022-08-01 ASSESSMENT — PAIN DESCRIPTION - PAIN TYPE: TYPE: CHRONIC PAIN

## 2022-08-01 ASSESSMENT — PAIN - FUNCTIONAL ASSESSMENT: PAIN_FUNCTIONAL_ASSESSMENT: PREVENTS OR INTERFERES WITH MANY ACTIVE NOT PASSIVE ACTIVITIES

## 2022-08-01 ASSESSMENT — PAIN DESCRIPTION - ONSET: ONSET: ON-GOING

## 2022-08-01 NOTE — PROGRESS NOTES
Pt ambulated with cane to triage room for assessment and care. Pulse is regular. Respirations are regular and unlabored. Mucous membranes are pink and moist. Alert and oriented to x 3. No c/o nausea or vomiting. ROM within pt limits. Calm and cooperative.

## 2022-08-01 NOTE — PROGRESS NOTES
__met__ Safety   GOAL: Patient will have ID or Allergy band on in the Urgent Care       (Environmental)  [place] to environment  Ensure ID band is correct and in place/ allergy band as needed  Assess for fall risk  Initiate fall precautions as applicable (fall band, side rails, etc.)  Call light within reach  Bed in low position/ wheels locked    __met__ Pain   GOAL:  Patient pain will be under control while here in the Urgent Care     Assess pain level and characteristics  Administer analgesics as ordered  Assess effectiveness of pain management and report to MD as needed    _met__ Knowledge Deficit:   GOAL: Patient will be educated on the medication they are receiving here in the Urgent Care  Assess baseline knowledge  Provide teaching at level of understanding  Provide teaching via preferred learning method  Evaluate teaching effectiveness    __met__ Hemodynamic/Respiratory Status:   GOAL: Patient vital signs will be assessed and monitored in the Urgent Care       (Pre and Post Procedure Monitoring)  Assess/Monitor vital signs and LOC  Assess Baseline SpO2 prior to any sedation  Obtain weight/height  Assess vital signs/ LOC until patient meets discharge criteria  Monitor procedure site and notify MD of any issues    CARE PLAN END DATE:  08/01/2022

## 2022-08-01 NOTE — DISCHARGE INSTR - COC
Continuity of Care Form    Patient Name: Cachorro Harris   :  1960  MRN:  [id number]    Admit date:  2022  Discharge date:  ***    Code Status Order: Prior   Advance Directives:     Admitting Physician:  No admitting provider for patient encounter. PCP: Celia Neff DO    Discharging Nurse: [facility] Unit/Room#: No information available for this encounter.   Discharging Unit Phone Number: ***    Emergency Contact:   Extended Emergency Contact Information  Primary Emergency Contact: Tito Golden  Address: [address] Phone: [phone]  Relation: Spouse    Past Surgical History:  Past Surgical History:   Procedure Laterality Date    ABDOMEN SURGERY      ABDOMEN SURGERY N/A 3/25/2022    ABDOMINAL LYSIS OF ADHESIONS, EXPLANT OF INFECTED MESH performed by Gricel Gerber MD at [address]      no stents    CARPAL TUNNEL RELEASE Bilateral     COLONOSCOPY  2017    Dr. Daquan Umanzor, [address]      x3 surgeries with 14 repairs    KNEE SURGERY Right     cartilage    NV EXPLORATORY OF ABDOMEN N/A 2018    ABDOMINAL EXPLORATION WITH LYSIS OF COMPLICATED ADHESIONS WITH AN EXTENDED RIGHT COLON RESECTION performed by Gricel Gerber MD at [facility]       Immunization History:   Immunization History   Administered Date(s) Administered    COVID-19, PFIZER PURPLE top, DILUTE for use, (age 15 y+), 30mcg/0.3mL 2021, 2021, 2021    Influenza Virus Vaccine 09/15/2017, 10/12/2018    Influenza, MDCK Quadv, IM, PF (Flucelvax 2 yrs and older) 10/16/2020    Influenza, Quadv, IM, PF (6 mo and older Fluzone, Flulaval, Fluarix, and 3 yrs and older Afluria) 2016, 2021    Pneumococcal Polysaccharide ([id number]) 2014    Tdap (Boostrix, Adacel) 2021       Active Problems:  Patient Active Problem List   Diagnosis Code Allergy to bee sting Z91.030    Obesity, Class III, BMI 40-49.9 (morbid obesity) (HCC) E66.01    Weight gain R63.5    Hypertension I10    Rheumatoid arteritis (HCC) M05.20    Hypothyroidism E03.9    Gastroenteritis K52.9    Obstructive sleep apnea G47.33    Small intestinal bacterial overgrowth K63.89    Acute diarrhea R19.7    Generalized abdominal pain R10.84    Nausea and vomiting R11.2    Hepatomegaly R16.0    Ileus (HCC) K56.7    Hypokalemia E87.6    S/P partial resection of colon Z90.49    S/P laparotomy Z98.890    Acute kidney failure with tubular necrosis (HCC) N17.0    Hypernatremia E87.0    Serum potassium decreased E87.6    Other headache syndrome G44.89    RLS (restless legs syndrome) G25.81    Psychophysiological insomnia F51.04    Angina, class III (HCC) I20.9    Acute respiratory failure with hypoxia ([facility]) J96.01    Dyspnea R06.00    CHF (NYHA class III, ACC/AHA stage C) (HCC) I50.9    Nonhealing surgical wound T81.89XA    Diabetes mellitus (HCC) E11.9    CAROLE (acute kidney injury) ([facility] [address] N17.9    Acute hypoxemic respiratory failure (HCC) J96.01    Acute on chronic congestive heart failure (HCC) I50.9    Stage 3a chronic kidney disease (HCC) N18.31    Anemia of chronic renal failure N18.9, D63.1    CHF (congestive heart failure), NYHA class III, acute on chronic, combined (HCC) I50.43    CHF (congestive heart failure), NYHA class II, chronic, diastolic (HCC) A61.08    Chronic kidney disease, stage IV (severe) (HCC) N18.4       Isolation/Infection:   Isolation            No Isolation          Patient Infection Status       Infection Onset Added Last Indicated Last Indicated By Review Planned Expiration Resolved Resolved By    None active    Resolved    COVID-19 (Rule Out) 04/20/22 04/20/22 04/20/22 COVID-19, Rapid (Ordered)   04/20/22 Rule-Out Test Resulted    COVID-19 (Rule Out) 03/19/22 03/19/22 03/19/22 Respiratory Panel, Molecular, with COVID-19 (Restricted: peds pts or suitable admitted adults) (Ordered)   03/19/22 Rule-Out Test Resulted    COVID-19 (Rule Out) 03/10/21 03/10/21 03/10/21 COVID-19, Rapid (Ordered)   03/12/21 Rule-Out Test Resulted    COVID-19 (Rule Out) 03/03/21 03/03/21 03/03/21 COVID-19, Rapid (Ordered)   03/03/21 Rule-Out Test Resulted    COVID-19 (Rule Out) 03/02/21 03/02/21 03/02/21 COVID-19, Rapid (Ordered)   03/02/21 Rule-Out Test Resulted    COVID-19 (Rule Out) 11/27/20 11/27/20 11/27/20 COVID-19 (Ordered)   11/30/20 Rule-Out Test Resulted            Nurse Assessment:  Last Vital Signs: /66   Pulse 66   Temp 96.8 °F (36 °C) (Temporal)   Resp 22   SpO2 96%     Last documented pain score (0-10 scale): Pain Level: 5  Last Weight:   Wt Readings from Last 1 Encounters:   07/25/22 (!) 329 lb (149.2 kg)     Mental Status:  {IP PT MENTAL STATUS:20030}    IV Access:  { GITA IV ACCESS:139174547}    Nursing Mobility/ADLs:  Walking   {CHP DME TENZ:637549221}  Transfer  {CHP DME HIZN:022364933}  Bathing  {CHP DME AKRJ:264088829}  Dressing  {CHP DME EKTY:774346550}  Toileting  {CHP DME CDAE:903842790}  Feeding  {CHP DME GECQ:966627724}  Med Admin  {CHP DME TAMV:885281626}  Med Delivery   { GITA MED Delivery:178914820}    Wound Care Documentation and Therapy:  Wound 09/22/21 Abdomen (Active)   Number of days: 312       Incision 03/25/22 Abdomen Medial (Active)   Number of days: 128        Elimination:  Continence: Bowel: {YES / RJ:17462}  Bladder: {YES / RB:18676}  Urinary Catheter: {Urinary Catheter:327720170}   Colostomy/Ileostomy/Ileal Conduit: {YES / RI:80031}       Date of Last BM: ***  No intake or output data in the 24 hours ending 08/01/22 1137  No intake/output data recorded.     Safety Concerns:     [address] Safety Concerns:416904206}    Impairments/Disabilities:      [address] SceneDoc Impairments/Disabilities:948591775}    Nutrition Therapy:  Current Nutrition Therapy:   [address] Rascon GITA Diet List:202002575}    Routes of Feeding: {CHP DME Other Feedings:992674544}  Liquids: {Slp liquid thickness:87002}  Daily Fluid Restriction: {CHP DME Yes amt example:549318017}  Last Modified Barium Swallow with Video (Video Swallowing Test): {Done Not Done BECI:418329204}    Treatments at the Time of Hospital Discharge:   Respiratory Treatments: ***  Oxygen Therapy:  {Therapy; copd oxygen:11040}  Ventilator:    { CC Vent LMJY:024574773}    Rehab Therapies: {THERAPEUTIC INTERVENTION:7618463067}  Weight Bearing Status/Restrictions: { CC Weight Bearin}  Other Medical Equipment (for information only, NOT a DME order):  {EQUIPMENT:965016556}  Other Treatments: ***    Patient's personal belongings (please select all that are sent with patient):  {CHP DME Belongings:698566369}    RN SIGNATURE:  {Esignature:915175470}    CASE MANAGEMENT/SOCIAL WORK SECTION    Inpatient Status Date: ***    Readmission Risk Assessment Score:  Readmission Risk              Risk of Unplanned Readmission:  0           Discharging to Facility/ Agency   Name:   Address:  Phone:  Fax:    Dialysis Facility (if applicable)   Name:  Address:  Dialysis Schedule:  Phone:  Fax:    / signature: {Esignature:929742906}    PHYSICIAN SECTION    Prognosis: {Prognosis:2669285131}    Condition at Discharge: [address] [place] [address] Patient Condition:257510462}    Rehab Potential (if transferring to Rehab): {Prognosis:2162125488}    Recommended Labs or Other Treatments After Discharge: ***    Physician Certification: I certify the above information and transfer of Mustapha Lazcano  is necessary for the continuing treatment of the diagnosis listed and that he requires {Admit to Appropriate Level of Care:44021} for {GREATER/LESS:309191574} 30 days.      Update Admission H&P: {CHP DME Changes in APRQ}    PHYSICIAN SIGNATURE:  {Esignature:311218252}

## 2022-08-04 ENCOUNTER — OFFICE VISIT (OUTPATIENT)
Dept: NEPHROLOGY | Age: 62
End: 2022-08-04
Payer: MEDICARE

## 2022-08-04 VITALS
DIASTOLIC BLOOD PRESSURE: 70 MMHG | HEART RATE: 78 BPM | OXYGEN SATURATION: 97 % | BODY MASS INDEX: 43.41 KG/M2 | WEIGHT: 315 LBS | SYSTOLIC BLOOD PRESSURE: 114 MMHG

## 2022-08-04 DIAGNOSIS — D63.1 ANEMIA IN STAGE 4 CHRONIC KIDNEY DISEASE (HCC): ICD-10-CM

## 2022-08-04 DIAGNOSIS — N18.4 CKD (CHRONIC KIDNEY DISEASE), STAGE IV (HCC): Primary | ICD-10-CM

## 2022-08-04 DIAGNOSIS — N18.4 ANEMIA IN STAGE 4 CHRONIC KIDNEY DISEASE (HCC): ICD-10-CM

## 2022-08-04 PROCEDURE — G8417 CALC BMI ABV UP PARAM F/U: HCPCS | Performed by: INTERNAL MEDICINE

## 2022-08-04 PROCEDURE — 4004F PT TOBACCO SCREEN RCVD TLK: CPT | Performed by: INTERNAL MEDICINE

## 2022-08-04 PROCEDURE — G8427 DOCREV CUR MEDS BY ELIG CLIN: HCPCS | Performed by: INTERNAL MEDICINE

## 2022-08-04 PROCEDURE — 99213 OFFICE O/P EST LOW 20 MIN: CPT | Performed by: INTERNAL MEDICINE

## 2022-08-04 PROCEDURE — 1111F DSCHRG MED/CURRENT MED MERGE: CPT | Performed by: INTERNAL MEDICINE

## 2022-08-04 PROCEDURE — 3017F COLORECTAL CA SCREEN DOC REV: CPT | Performed by: INTERNAL MEDICINE

## 2022-08-04 NOTE — PROGRESS NOTES
[facility]    [facility], [address]   [place], [place] [facility]  [phone]  Progress Note  8/4/2022 3:23 PM        Pt Name:    Jonn Avendaño:    1960  Primary Care Physician:  Alma Mcintosh DO     Chief Complaint:   Chief Complaint   Patient presents with    Follow-up: HTN, CKD  . Background Information/Interval History:   63 male with HTN, hernia surgeries, obesity, DM, chronic constipation, thyroid disorder, BPH, hx abd exp surgery with JENNY due to recurrent small bowel obstructions, chronic loose stools, chronic hypokalemia secondary to GI losses. Has cardioMEMS. Has been following with CHF clinic. Here for follow-up. Doing better. Leg swelling is better. Wts stable. On chronic oxygen. Breathing better. Received ISAAC as outpatient. Past History:  Past Medical History:   Diagnosis Date    Arthritis     Back problem     back pain-sees [facility] pain mgmt    Bladder disease     Dr. Ewa Vences    CHF with unknown LVEF ([facility] [address] 05/24/2021    Dr. George/CHF clinic    Chronic kidney disease     Constipation     Diabetes mellitus ([facility] [address]     Dr. oDna Lin    Hypertension     Hypertensive emergency 04/11/2019    Hypertensive urgency 04/13/2019    Sleep apnea     has bipap-sees AZRA Sun CNP    Thyroid disease      Past Surgical History:   Procedure Laterality Date    ABDOMEN SURGERY      ABDOMEN SURGERY N/A 3/25/2022    ABDOMINAL LYSIS OF ADHESIONS, EXPLANT OF INFECTED MESH performed by Femi Jacques MD at [address]      no stents    CARPAL TUNNEL RELEASE Bilateral     COLONOSCOPY  2017    Dr. Yenni James, [address]      x3 surgeries with 14 repairs    KNEE SURGERY Right 1980's    cartilage    IA EXPLORATORY OF ABDOMEN N/A 2/5/2018    ABDOMINAL EXPLORATION WITH LYSIS OF COMPLICATED ADHESIONS WITH AN EXTENDED RIGHT COLON RESECTION performed by Femi Jacques MD at [address]:  /70 (Site: Right Upper Arm, Position: Sitting, Cuff Size: Large Adult)   Pulse 78   Wt (!) 329 lb (149.2 kg)   SpO2 97%   BMI 43.41 kg/m²   Wt Readings from Last 3 Encounters:   08/04/22 (!) 329 lb (149.2 kg)   07/25/22 (!) 329 lb (149.2 kg)   07/22/22 (!) 334 lb (151.5 kg)     Body mass index is 43.41 kg/m². General Appearance: alert and cooperative with exam, appears comfortable, no distress  Neck: No jugular venous distention  Lungs: Air entry B/L, no crackles or rales, no use of accessory muscles  Heart: S1, S2 heard  GI: soft, non-tender, no guarding, obese  Extremities: B/L trace edema, sig improved     Medications:  Current Outpatient Medications   Medication Sig Dispense Refill    naloxone 4 MG/0.1ML LIQD nasal spray 1 spray by Nasal route as needed for Opioid Reversal 1 each 0    rOPINIRole (REQUIP) 0.5 MG tablet take 1 tablet by mouth IN THE AFTERNOON AND 2 TABLETS AT NIGHT. 90 tablet 2    potassium chloride (K-TAB) 10 MEQ extended release tablet Take 2 tablets by mouth in the morning. 60 tablet 3    bumetanide (BUMEX) 1 MG tablet Take 1 tablet by mouth in the morning and 1 tablet before bedtime. 60 tablet 2    gabapentin (NEURONTIN) 300 MG capsule Take 1 capsule by mouth nightly for 30 days. 30 capsule 0    oxyCODONE-acetaminophen (PERCOCET)  MG per tablet Take 1 tablet by mouth every 8 hours as needed for Pain for up to 30 days.  Intended supply: 30 days 90 tablet 0    doxepin (SINEQUAN) 25 MG capsule take 2 capsules by mouth at bedtime 60 capsule 3    CPAP Machine MISC by Does not apply route Please change bipap to 18/14 cm H20. 1 each 0    carvedilol (COREG) 25 MG tablet Take 2 tablets by mouth 2 times daily 120 tablet 5    oxybutynin (DITROPAN-XL) 5 MG extended release tablet Take 1 tablet by mouth at bedtime 30 tablet 0    amitriptyline (ELAVIL) 10 MG tablet Take 10 mg by mouth 2 times daily      tamsulosin (FLOMAX) 0.4 MG capsule Take 1 capsule by mouth nightly 90 capsule 3    hydrALAZINE (APRESOLINE) 25 MG tablet Take 1 tablet by mouth every 8 hours 90 tablet 3    levothyroxine (SYNTHROID) 175 MCG tablet Take 1 tablet by mouth Daily 30 tablet 3    melatonin 3 MG TABS tablet Take 1.5 tablets by mouth nightly as needed (Sleep) 45 tablet 3    ipratropium-albuterol (DUONEB) 0.5-2.5 (3) MG/3ML SOLN nebulizer solution Inhale 3 mLs into the lungs every 4 hours as needed for Shortness of Breath (Patient taking differently: Inhale 1 vial into the lungs every 4 hours as needed for Shortness of Breath Usually uses at night) 360 mL 1    montelukast (SINGULAIR) 10 MG tablet Take 1 tablet by mouth nightly 90 tablet 3    albuterol sulfate  (90 Base) MCG/ACT inhaler Inhale 2 puffs into the lungs every 6 hours as needed for Wheezing or Shortness of Breath 1 each 5    rOPINIRole (REQUIP) 1 MG tablet Take 1 pill in afternoon and 2 at bedtime 270 tablet 1    aspirin EC 81 MG EC tablet Take 1 tablet by mouth daily 90 tablet 1    nitroGLYCERIN (NITROSTAT) 0.4 MG SL tablet Place 1 tablet under the tongue every 5 minutes as needed for Chest pain (up to 3 doses) 25 tablet 3    glimepiride (AMARYL) 4 MG tablet Take 4 mg by mouth daily       azelastine (ASTELIN) 137 MCG/SPRAY nasal spray 1 spray by Nasal route as needed. Use in each nostril as directed      amLODIPine (NORVASC) 10 MG tablet Take 1 tablet by mouth daily (Patient not taking: Reported on 8/4/2022) 30 tablet 3    Fluticasone furoate-vilanterol (BREO ELLIPTA) 200-25 MCG/INH AEPB inhaler Inhale 1 puff into the lungs daily (Patient not taking: Reported on 8/4/2022) 3 each 3     No current facility-administered medications for this visit.         Laboratory & Diagnostics:  Feb 2018: R 14.3, L 15.4, Two simple renal cysts  K 4.5, Creat 1.3   June 2018: K 2.8, creat 1.0, Aldosterone 31, Renin 0.5  Sept 2018: K 3.3  Dec 2018: K 4.3, Creat 1.0, Mg 2.0  April EF 40-45%  June 2019: K 4.5, creat 1.3, Mg 1.9, UPCR 610 mg/g  Aug 2019: K 4.4, Creat 1.0, Mg 2.1    Taye 2020: K 4.2, Creat 1.0, UPCR 240 mg/g  July 2020: K 4.2, Creat 0.9  July 2021: K 4.9, creat 1.2, Mg 2.1, UPCR 100 mg/g    April 2022: K 3.5, Creat 2.1, ca 8.3  May 2022: Creat 3.2  June 2022: UPCR 4.2 g/g  July 2022: Creat 3.3, K 3.4, Hb 7.3    July 2022: K 3.4, Creat 3.3  Aug 2022: hemoglobin 8.1     Impression/Plan:   1. Progressive CKD IV:  He has about 2.5 grams of proteinuria likely due to DM and obesity possibly post adaptive FSGS. Creat is 3.3. Continue with current mgmt. Need BMP today. 2. HTN: on hydralazine/norvasc and coreg. 3. Chronic/HFpEF Cardio MEMS: on bumex 1 mg BId. Wts are stable. No sig edema. 4. Anemia in CKD: on ISAAC, Hemoglobin is improving. 5. Hx colon surgery/colon resection (Feb 2017): chronic diarrhea  6. Obesity: strongly advised weight loss and lifestyle modification  7. B/L renal simple cysts: US done in Feb 2018  8. DM with proteinuria  9. Sleep apnea: on CPAP machine    Continue with weekly retacrit injections. Orders Placed This Encounter   Procedures    Basic Metabolic Panel    Hemoglobin and Hematocrit    Basic Metabolic Panel     Return in about 4 weeks (around 9/1/2022).       Cristine Hudson MD  [facility]

## 2022-08-08 ENCOUNTER — HOSPITAL ENCOUNTER (OUTPATIENT)
Age: 62
Discharge: HOME | DRG: 674 | End: 2022-08-08
Payer: MEDICARE

## 2022-08-08 ENCOUNTER — TELEPHONE (OUTPATIENT)
Dept: NEPHROLOGY | Age: 62
End: 2022-08-08

## 2022-08-08 ENCOUNTER — HOSPITAL ENCOUNTER (OUTPATIENT)
Dept: GENERAL RADIOLOGY | Age: 62
Discharge: HOME | DRG: 674 | End: 2022-08-08
Payer: MEDICARE

## 2022-08-08 ENCOUNTER — HOSPITAL ENCOUNTER (INPATIENT)
Age: 62
LOS: 9 days | Discharge: HOME | DRG: 674 | End: 2022-08-17
Attending: HOSPITALIST | Admitting: HOSPITALIST
Payer: MEDICARE

## 2022-08-08 VITALS
HEART RATE: 68 BPM | DIASTOLIC BLOOD PRESSURE: 64 MMHG | SYSTOLIC BLOOD PRESSURE: 124 MMHG | OXYGEN SATURATION: 97 % | TEMPERATURE: 98 F | RESPIRATION RATE: 18 BRPM

## 2022-08-08 DIAGNOSIS — D63.1 ANEMIA IN STAGE 4 CHRONIC KIDNEY DISEASE (HCC): ICD-10-CM

## 2022-08-08 DIAGNOSIS — N18.5 ACUTE RENAL FAILURE SUPERIMPOSED ON STAGE 5 CHRONIC KIDNEY DISEASE, NOT ON CHRONIC DIALYSIS, UNSPECIFIED ACUTE RENAL FAILURE TYPE (HCC): Primary | ICD-10-CM

## 2022-08-08 DIAGNOSIS — D63.1 ANEMIA OF CHRONIC RENAL FAILURE, STAGE 4 (SEVERE) (HCC): ICD-10-CM

## 2022-08-08 DIAGNOSIS — N18.4 CHRONIC KIDNEY DISEASE, STAGE IV (SEVERE) (HCC): Primary | ICD-10-CM

## 2022-08-08 DIAGNOSIS — E87.5 HYPERKALEMIA: ICD-10-CM

## 2022-08-08 DIAGNOSIS — N18.4 ANEMIA OF CHRONIC RENAL FAILURE, STAGE 4 (SEVERE) (HCC): ICD-10-CM

## 2022-08-08 DIAGNOSIS — N17.9 ACUTE RENAL FAILURE SUPERIMPOSED ON STAGE 5 CHRONIC KIDNEY DISEASE, NOT ON CHRONIC DIALYSIS, UNSPECIFIED ACUTE RENAL FAILURE TYPE (HCC): Primary | ICD-10-CM

## 2022-08-08 DIAGNOSIS — N18.4 ANEMIA IN STAGE 4 CHRONIC KIDNEY DISEASE (HCC): ICD-10-CM

## 2022-08-08 DIAGNOSIS — N18.4 CKD (CHRONIC KIDNEY DISEASE), STAGE IV (HCC): ICD-10-CM

## 2022-08-08 LAB
ALBUMIN SERPL-MCNC: 3.7 G/DL (ref 3.5–5.1)
ALP BLD-CCNC: 91 U/L (ref 38–126)
ALT SERPL-CCNC: 9 U/L (ref 11–66)
ANION GAP SERPL CALCULATED.3IONS-SCNC: 14 MEQ/L (ref 8–16)
ANION GAP SERPL CALCULATED.3IONS-SCNC: 16 MEQ/L (ref 8–16)
ANION GAP SERPL CALCULATED.3IONS-SCNC: 19 MEQ/L (ref 8–16)
AST SERPL-CCNC: 9 U/L (ref 5–40)
BASOPHILS # BLD: 0.8 %
BASOPHILS ABSOLUTE: 0.1 THOU/MM3 (ref 0–0.1)
BILIRUB SERPL-MCNC: 0.3 MG/DL (ref 0.3–1.2)
BUN BLDV-MCNC: 69 MG/DL (ref 7–22)
BUN BLDV-MCNC: 72 MG/DL (ref 7–22)
BUN BLDV-MCNC: 72 MG/DL (ref 7–22)
CALCIUM SERPL-MCNC: 9.4 MG/DL (ref 8.5–10.5)
CALCIUM SERPL-MCNC: 9.5 MG/DL (ref 8.5–10.5)
CALCIUM SERPL-MCNC: 9.6 MG/DL (ref 8.5–10.5)
CHLORIDE BLD-SCNC: 101 MEQ/L (ref 98–111)
CHLORIDE BLD-SCNC: 98 MEQ/L (ref 98–111)
CHLORIDE BLD-SCNC: 99 MEQ/L (ref 98–111)
CO2: 21 MEQ/L (ref 23–33)
CO2: 23 MEQ/L (ref 23–33)
CO2: 25 MEQ/L (ref 23–33)
CREAT SERPL-MCNC: 6.5 MG/DL (ref 0.4–1.2)
CREAT SERPL-MCNC: 6.6 MG/DL (ref 0.4–1.2)
CREAT SERPL-MCNC: 6.6 MG/DL (ref 0.4–1.2)
EOSINOPHIL # BLD: 7 %
EOSINOPHILS ABSOLUTE: 0.5 THOU/MM3 (ref 0–0.4)
ERYTHROCYTE [DISTWIDTH] IN BLOOD BY AUTOMATED COUNT: 17.5 % (ref 11.5–14.5)
ERYTHROCYTE [DISTWIDTH] IN BLOOD BY AUTOMATED COUNT: 54.6 FL (ref 35–45)
GFR SERPL CREATININE-BSD FRML MDRD: 9 ML/MIN/1.73M2
GLUCOSE BLD-MCNC: 164 MG/DL (ref 70–108)
GLUCOSE BLD-MCNC: 56 MG/DL (ref 70–108)
GLUCOSE BLD-MCNC: 65 MG/DL (ref 70–108)
GLUCOSE BLD-MCNC: 68 MG/DL (ref 70–108)
GLUCOSE BLD-MCNC: 70 MG/DL (ref 70–108)
HCT VFR BLD CALC: 27.2 % (ref 42–52)
HCT VFR BLD CALC: 28.6 % (ref 42–52)
HEMOGLOBIN: 8.4 GM/DL (ref 14–18)
HEMOGLOBIN: 8.6 GM/DL (ref 14–18)
IMMATURE GRANS (ABS): 0.01 THOU/MM3 (ref 0–0.07)
IMMATURE GRANULOCYTES: 0.2 %
LYMPHOCYTES # BLD: 12.4 %
LYMPHOCYTES ABSOLUTE: 0.8 THOU/MM3 (ref 1–4.8)
MAGNESIUM: 2.5 MG/DL (ref 1.6–2.4)
MCH RBC QN AUTO: 25.7 PG (ref 26–33)
MCHC RBC AUTO-ENTMCNC: 30.1 GM/DL (ref 32.2–35.5)
MCV RBC AUTO: 85.6 FL (ref 80–94)
MONOCYTES # BLD: 11.2 %
MONOCYTES ABSOLUTE: 0.7 THOU/MM3 (ref 0.4–1.3)
NUCLEATED RED BLOOD CELLS: 0 /100 WBC
OSMOLALITY CALCULATION: 292.2 MOSMOL/KG (ref 275–300)
OSMOLALITY CALCULATION: 295 MOSMOL/KG (ref 275–300)
PLATELET # BLD: 284 THOU/MM3 (ref 130–400)
PMV BLD AUTO: 8.3 FL (ref 9.4–12.4)
POTASSIUM REFLEX MAGNESIUM: 6.8 MEQ/L (ref 3.5–5.2)
POTASSIUM SERPL-SCNC: 6.7 MEQ/L (ref 3.5–5.2)
POTASSIUM SERPL-SCNC: 7 MEQ/L (ref 3.5–5.2)
PRO-BNP: 865 PG/ML (ref 0–900)
RBC # BLD: 3.34 MILL/MM3 (ref 4.7–6.1)
SEG NEUTROPHILS: 68.4 %
SEGMENTED NEUTROPHILS ABSOLUTE COUNT: 4.5 THOU/MM3 (ref 1.8–7.7)
SODIUM BLD-SCNC: 137 MEQ/L (ref 135–145)
SODIUM BLD-SCNC: 138 MEQ/L (ref 135–145)
SODIUM BLD-SCNC: 141 MEQ/L (ref 135–145)
TOTAL PROTEIN: 8.4 G/DL (ref 6.1–8)
TROPONIN T: 0.04 NG/ML
WBC # BLD: 6.6 THOU/MM3 (ref 4.8–10.8)

## 2022-08-08 PROCEDURE — 85018 HEMOGLOBIN: CPT

## 2022-08-08 PROCEDURE — 85014 HEMATOCRIT: CPT

## 2022-08-08 PROCEDURE — 82948 REAGENT STRIP/BLOOD GLUCOSE: CPT

## 2022-08-08 PROCEDURE — 2580000003 HC RX 258: Performed by: NURSE PRACTITIONER

## 2022-08-08 PROCEDURE — 2060000000 HC ICU INTERMEDIATE R&B

## 2022-08-08 PROCEDURE — 96365 THER/PROPH/DIAG IV INF INIT: CPT

## 2022-08-08 PROCEDURE — 93005 ELECTROCARDIOGRAM TRACING: CPT | Performed by: NURSE PRACTITIONER

## 2022-08-08 PROCEDURE — 6360000002 HC RX W HCPCS: Performed by: NURSE PRACTITIONER

## 2022-08-08 PROCEDURE — 2700000000 HC OXYGEN THERAPY PER DAY

## 2022-08-08 PROCEDURE — 83735 ASSAY OF MAGNESIUM: CPT

## 2022-08-08 PROCEDURE — 85025 COMPLETE CBC W/AUTO DIFF WBC: CPT

## 2022-08-08 PROCEDURE — 6360000002 HC RX W HCPCS: Performed by: INTERNAL MEDICINE

## 2022-08-08 PROCEDURE — 96372 THER/PROPH/DIAG INJ SC/IM: CPT

## 2022-08-08 PROCEDURE — 2500000003 HC RX 250 WO HCPCS: Performed by: NURSE PRACTITIONER

## 2022-08-08 PROCEDURE — 6360000002 HC RX W HCPCS

## 2022-08-08 PROCEDURE — 94640 AIRWAY INHALATION TREATMENT: CPT

## 2022-08-08 PROCEDURE — 83880 ASSAY OF NATRIURETIC PEPTIDE: CPT

## 2022-08-08 PROCEDURE — 84484 ASSAY OF TROPONIN QUANT: CPT

## 2022-08-08 PROCEDURE — 36415 COLL VENOUS BLD VENIPUNCTURE: CPT

## 2022-08-08 PROCEDURE — 6370000000 HC RX 637 (ALT 250 FOR IP): Performed by: NURSE PRACTITIONER

## 2022-08-08 PROCEDURE — 80053 COMPREHEN METABOLIC PANEL: CPT

## 2022-08-08 PROCEDURE — 96375 TX/PRO/DX INJ NEW DRUG ADDON: CPT

## 2022-08-08 PROCEDURE — 99285 EMERGENCY DEPT VISIT HI MDM: CPT

## 2022-08-08 RX ORDER — SODIUM CHLORIDE 0.9 % (FLUSH) 0.9 %
5-40 SYRINGE (ML) INJECTION EVERY 12 HOURS SCHEDULED
Status: DISCONTINUED | OUTPATIENT
Start: 2022-08-08 | End: 2022-08-17 | Stop reason: HOSPADM

## 2022-08-08 RX ORDER — ENOXAPARIN SODIUM 100 MG/ML
30 INJECTION SUBCUTANEOUS EVERY 24 HOURS
Status: DISCONTINUED | OUTPATIENT
Start: 2022-08-09 | End: 2022-08-14

## 2022-08-08 RX ORDER — ROPINIROLE 0.5 MG/1
0.5 TABLET, FILM COATED ORAL DAILY
Status: DISCONTINUED | OUTPATIENT
Start: 2022-08-09 | End: 2022-08-17 | Stop reason: HOSPADM

## 2022-08-08 RX ORDER — SODIUM CHLORIDE 9 MG/ML
INJECTION, SOLUTION INTRAVENOUS PRN
Status: DISCONTINUED | OUTPATIENT
Start: 2022-08-08 | End: 2022-08-17 | Stop reason: HOSPADM

## 2022-08-08 RX ORDER — CARVEDILOL 25 MG/1
50 TABLET ORAL 2 TIMES DAILY WITH MEALS
Status: DISCONTINUED | OUTPATIENT
Start: 2022-08-09 | End: 2022-08-16

## 2022-08-08 RX ORDER — SODIUM CHLORIDE 9 MG/ML
INJECTION, SOLUTION INTRAVENOUS CONTINUOUS
Status: DISCONTINUED | OUTPATIENT
Start: 2022-08-08 | End: 2022-08-13

## 2022-08-08 RX ORDER — OXYCODONE AND ACETAMINOPHEN 10; 325 MG/1; MG/1
1 TABLET ORAL EVERY 8 HOURS PRN
Status: DISCONTINUED | OUTPATIENT
Start: 2022-08-08 | End: 2022-08-17 | Stop reason: HOSPADM

## 2022-08-08 RX ORDER — POLYETHYLENE GLYCOL 3350 17 G/17G
17 POWDER, FOR SOLUTION ORAL DAILY PRN
Status: DISCONTINUED | OUTPATIENT
Start: 2022-08-08 | End: 2022-08-14

## 2022-08-08 RX ORDER — SODIUM POLYSTYRENE SULFONATE 15 G/60ML
15 SUSPENSION ORAL; RECTAL ONCE
Status: COMPLETED | OUTPATIENT
Start: 2022-08-08 | End: 2022-08-08

## 2022-08-08 RX ORDER — TAMSULOSIN HYDROCHLORIDE 0.4 MG/1
0.4 CAPSULE ORAL NIGHTLY
Status: DISCONTINUED | OUTPATIENT
Start: 2022-08-08 | End: 2022-08-17 | Stop reason: HOSPADM

## 2022-08-08 RX ORDER — ONDANSETRON 4 MG/1
4 TABLET, ORALLY DISINTEGRATING ORAL EVERY 8 HOURS PRN
Status: DISCONTINUED | OUTPATIENT
Start: 2022-08-08 | End: 2022-08-17 | Stop reason: HOSPADM

## 2022-08-08 RX ORDER — SODIUM CHLORIDE 0.9 % (FLUSH) 0.9 %
5-40 SYRINGE (ML) INJECTION PRN
Status: DISCONTINUED | OUTPATIENT
Start: 2022-08-08 | End: 2022-08-17 | Stop reason: HOSPADM

## 2022-08-08 RX ORDER — ASPIRIN 81 MG/1
81 TABLET ORAL DAILY
Status: DISCONTINUED | OUTPATIENT
Start: 2022-08-09 | End: 2022-08-17 | Stop reason: HOSPADM

## 2022-08-08 RX ORDER — ONDANSETRON 2 MG/ML
4 INJECTION INTRAMUSCULAR; INTRAVENOUS ONCE
Status: COMPLETED | OUTPATIENT
Start: 2022-08-08 | End: 2022-08-08

## 2022-08-08 RX ORDER — ACETAMINOPHEN 325 MG/1
650 TABLET ORAL EVERY 6 HOURS PRN
Status: DISCONTINUED | OUTPATIENT
Start: 2022-08-08 | End: 2022-08-17 | Stop reason: HOSPADM

## 2022-08-08 RX ORDER — LANOLIN ALCOHOL/MO/W.PET/CERES
4.5 CREAM (GRAM) TOPICAL NIGHTLY PRN
Status: DISCONTINUED | OUTPATIENT
Start: 2022-08-09 | End: 2022-08-17 | Stop reason: HOSPADM

## 2022-08-08 RX ORDER — ONDANSETRON 2 MG/ML
INJECTION INTRAMUSCULAR; INTRAVENOUS
Status: COMPLETED
Start: 2022-08-08 | End: 2022-08-08

## 2022-08-08 RX ORDER — ACETAMINOPHEN 650 MG/1
650 SUPPOSITORY RECTAL EVERY 6 HOURS PRN
Status: DISCONTINUED | OUTPATIENT
Start: 2022-08-08 | End: 2022-08-17 | Stop reason: HOSPADM

## 2022-08-08 RX ORDER — DEXTROSE MONOHYDRATE 100 MG/ML
INJECTION, SOLUTION INTRAVENOUS CONTINUOUS PRN
Status: DISCONTINUED | OUTPATIENT
Start: 2022-08-08 | End: 2022-08-17 | Stop reason: HOSPADM

## 2022-08-08 RX ORDER — OXYBUTYNIN CHLORIDE 5 MG/1
5 TABLET, EXTENDED RELEASE ORAL NIGHTLY
Status: DISCONTINUED | OUTPATIENT
Start: 2022-08-09 | End: 2022-08-17 | Stop reason: HOSPADM

## 2022-08-08 RX ORDER — MONTELUKAST SODIUM 10 MG/1
10 TABLET ORAL NIGHTLY
Status: DISCONTINUED | OUTPATIENT
Start: 2022-08-09 | End: 2022-08-17 | Stop reason: HOSPADM

## 2022-08-08 RX ORDER — ONDANSETRON 2 MG/ML
4 INJECTION INTRAMUSCULAR; INTRAVENOUS EVERY 6 HOURS PRN
Status: DISCONTINUED | OUTPATIENT
Start: 2022-08-08 | End: 2022-08-17 | Stop reason: HOSPADM

## 2022-08-08 RX ORDER — CALCIUM GLUCONATE 94 MG/ML
1000 INJECTION, SOLUTION INTRAVENOUS ONCE
Status: COMPLETED | OUTPATIENT
Start: 2022-08-08 | End: 2022-08-08

## 2022-08-08 RX ORDER — NITROGLYCERIN 0.4 MG/1
0.4 TABLET SUBLINGUAL EVERY 5 MIN PRN
Status: DISCONTINUED | OUTPATIENT
Start: 2022-08-08 | End: 2022-08-17 | Stop reason: HOSPADM

## 2022-08-08 RX ADMIN — SODIUM BICARBONATE 50 MEQ: 84 INJECTION, SOLUTION INTRAVENOUS at 20:10

## 2022-08-08 RX ADMIN — INSULIN HUMAN 10 UNITS: 100 INJECTION, SOLUTION PARENTERAL at 20:10

## 2022-08-08 RX ADMIN — SODIUM CHLORIDE: 9 INJECTION, SOLUTION INTRAVENOUS at 20:52

## 2022-08-08 RX ADMIN — EPOETIN ALFA-EPBX 10000 UNITS: 10000 INJECTION, SOLUTION INTRAVENOUS; SUBCUTANEOUS at 11:22

## 2022-08-08 RX ADMIN — ONDANSETRON 4 MG: 2 INJECTION INTRAMUSCULAR; INTRAVENOUS at 20:51

## 2022-08-08 RX ADMIN — CALCIUM GLUCONATE 1000 MG: 98 INJECTION, SOLUTION INTRAVENOUS at 20:10

## 2022-08-08 RX ADMIN — SODIUM POLYSTYRENE SULFONATE 15 G: 15 SUSPENSION ORAL; RECTAL at 20:10

## 2022-08-08 RX ADMIN — ALBUTEROL SULFATE 10 MG: 2.5 SOLUTION RESPIRATORY (INHALATION) at 19:59

## 2022-08-08 RX ADMIN — DEXTROSE MONOHYDRATE 250 ML: 100 INJECTION, SOLUTION INTRAVENOUS at 20:26

## 2022-08-08 ASSESSMENT — PAIN DESCRIPTION - FREQUENCY: FREQUENCY: CONTINUOUS

## 2022-08-08 ASSESSMENT — PAIN - FUNCTIONAL ASSESSMENT
PAIN_FUNCTIONAL_ASSESSMENT: NONE - DENIES PAIN
PAIN_FUNCTIONAL_ASSESSMENT: NONE - DENIES PAIN

## 2022-08-08 ASSESSMENT — PAIN DESCRIPTION - PAIN TYPE: TYPE: ACUTE PAIN

## 2022-08-08 NOTE — PROGRESS NOTES
Patient walked to triage room for outpatient nursing injection. Vital signs and assessment done at this time. Blood pressure initially was 97/56 and attempted again 102/52. Patient states he felt off today, he had fallen twice at home this morning and having hand tremors. In the Urgent Care we did a finger stick glucose which was 70. Patient given a Gatorade and a Reeses peanut butter cup, after he ate it he states he feels better. Dr. Ivan Cárdenas office contacted in regards to patient. Allie Barbas in the office, and she paged Dr. Ivan Cárdenas about medication to be given, and it was okay to give medication here today. 9680 Patient left in stable condition after shot time, patient feeling better and blood pressure 125/65 and pulse 73.

## 2022-08-08 NOTE — PROGRESS NOTES
__met__ Safety   GOAL: Patient will have ID or Allergy band on in the Urgent Care       (Environmental)  Fisher to environment  Ensure ID band is correct and in place/ allergy band as needed  Assess for fall risk  Initiate fall precautions as applicable (fall band, side rails, etc.)  Call light within reach  Bed in low position/ wheels locked    __met__ Pain   GOAL:  Patient pain will be under control while here in the Urgent Care     Assess pain level and characteristics  Administer analgesics as ordered  Assess effectiveness of pain management and report to MD as needed    _met__ Knowledge Deficit:   GOAL: Patient will be educated on the medication they are receiving here in the Urgent Care  Assess baseline knowledge  Provide teaching at level of understanding  Provide teaching via preferred learning method  Evaluate teaching effectiveness    __met__ Hemodynamic/Respiratory Status:   GOAL: Patient vital signs will be assessed and monitored in the Urgent Care       (Pre and Post Procedure Monitoring)  Assess/Monitor vital signs and LOC  Assess Baseline SpO2 prior to any sedation  Obtain weight/height  Assess vital signs/ LOC until patient meets discharge criteria  Monitor procedure site and notify MD of any issues      CARE PLAN END DATE:  Aug 8, 2022

## 2022-08-08 NOTE — ED TRIAGE NOTES
Patient presents to ER with complaints of abnormal blood work that was completed today. Patient reports being sent over by Kidney doctor.

## 2022-08-08 NOTE — TELEPHONE ENCOUNTER
Recv. Call from [facility] with critical levels, Creatine 6.49 and Potassium 6.7. Spoke to Dr. Armond Ascencio and he gave orders to send patient to ER. I spoke with patient and informed of order to go to ER. Patient voiced understanding and will head that way.

## 2022-08-09 ENCOUNTER — APPOINTMENT (OUTPATIENT)
Dept: ULTRASOUND IMAGING | Age: 62
DRG: 674 | End: 2022-08-09
Payer: MEDICARE

## 2022-08-09 LAB
ANION GAP SERPL CALCULATED.3IONS-SCNC: 15 MEQ/L (ref 8–16)
BASOPHILS # BLD: 0.8 %
BASOPHILS ABSOLUTE: 0 THOU/MM3 (ref 0–0.1)
BUN BLDV-MCNC: 71 MG/DL (ref 7–22)
CALCIUM SERPL-MCNC: 9.5 MG/DL (ref 8.5–10.5)
CHLORIDE BLD-SCNC: 102 MEQ/L (ref 98–111)
CO2: 21 MEQ/L (ref 23–33)
CREAT SERPL-MCNC: 6.3 MG/DL (ref 0.4–1.2)
EKG ATRIAL RATE: 64 BPM
EKG P AXIS: 14 DEGREES
EKG P-R INTERVAL: 196 MS
EKG Q-T INTERVAL: 404 MS
EKG QRS DURATION: 110 MS
EKG QTC CALCULATION (BAZETT): 416 MS
EKG R AXIS: -11 DEGREES
EKG VENTRICULAR RATE: 64 BPM
EOSINOPHIL # BLD: 8.1 %
EOSINOPHILS ABSOLUTE: 0.5 THOU/MM3 (ref 0–0.4)
ERYTHROCYTE [DISTWIDTH] IN BLOOD BY AUTOMATED COUNT: 17.3 % (ref 11.5–14.5)
ERYTHROCYTE [DISTWIDTH] IN BLOOD BY AUTOMATED COUNT: 55.2 FL (ref 35–45)
GFR SERPL CREATININE-BSD FRML MDRD: 9 ML/MIN/1.73M2
GLUCOSE BLD-MCNC: 110 MG/DL (ref 70–108)
GLUCOSE BLD-MCNC: 52 MG/DL (ref 70–108)
GLUCOSE BLD-MCNC: 78 MG/DL (ref 70–108)
GLUCOSE BLD-MCNC: 81 MG/DL (ref 70–108)
GLUCOSE BLD-MCNC: 81 MG/DL (ref 70–108)
GLUCOSE BLD-MCNC: 83 MG/DL (ref 70–108)
GLUCOSE BLD-MCNC: 96 MG/DL (ref 70–108)
HCT VFR BLD CALC: 26.9 % (ref 42–52)
HCT VFR BLD CALC: 27.5 % (ref 42–52)
HEMOGLOBIN: 7.8 GM/DL (ref 14–18)
HEMOGLOBIN: 8.2 GM/DL (ref 14–18)
IMMATURE GRANS (ABS): 0.02 THOU/MM3 (ref 0–0.07)
IMMATURE GRANULOCYTES: 0.3 %
LYMPHOCYTES # BLD: 11.4 %
LYMPHOCYTES ABSOLUTE: 0.7 THOU/MM3 (ref 1–4.8)
MAGNESIUM: 2.5 MG/DL (ref 1.6–2.4)
MCH RBC QN AUTO: 25.5 PG (ref 26–33)
MCHC RBC AUTO-ENTMCNC: 29 GM/DL (ref 32.2–35.5)
MCV RBC AUTO: 87.9 FL (ref 80–94)
MONOCYTES # BLD: 10.9 %
MONOCYTES ABSOLUTE: 0.7 THOU/MM3 (ref 0.4–1.3)
NUCLEATED RED BLOOD CELLS: 0 /100 WBC
OSMOLALITY CALCULATION: 295.4 MOSMOL/KG (ref 275–300)
PHOSPHORUS: 7.7 MG/DL (ref 2.4–4.7)
PLATELET # BLD: 249 THOU/MM3 (ref 130–400)
PMV BLD AUTO: 8.4 FL (ref 9.4–12.4)
POTASSIUM REFLEX MAGNESIUM: 6.3 MEQ/L (ref 3.5–5.2)
POTASSIUM SERPL-SCNC: 6.3 MEQ/L (ref 3.5–5.2)
POTASSIUM SERPL-SCNC: 6.6 MEQ/L (ref 3.5–5.2)
POTASSIUM SERPL-SCNC: 6.7 MEQ/L (ref 3.5–5.2)
POTASSIUM SERPL-SCNC: 6.9 MEQ/L (ref 3.5–5.2)
RBC # BLD: 3.06 MILL/MM3 (ref 4.7–6.1)
REASON FOR REJECTION: NORMAL
REJECTED TEST: NORMAL
SEG NEUTROPHILS: 68.5 %
SEGMENTED NEUTROPHILS ABSOLUTE COUNT: 4.2 THOU/MM3 (ref 1.8–7.7)
SODIUM BLD-SCNC: 138 MEQ/L (ref 135–145)
WBC # BLD: 6.2 THOU/MM3 (ref 4.8–10.8)

## 2022-08-09 PROCEDURE — 85018 HEMOGLOBIN: CPT

## 2022-08-09 PROCEDURE — 6370000000 HC RX 637 (ALT 250 FOR IP): Performed by: INTERNAL MEDICINE

## 2022-08-09 PROCEDURE — 85014 HEMATOCRIT: CPT

## 2022-08-09 PROCEDURE — 6370000000 HC RX 637 (ALT 250 FOR IP)

## 2022-08-09 PROCEDURE — 84132 ASSAY OF SERUM POTASSIUM: CPT

## 2022-08-09 PROCEDURE — 6370000000 HC RX 637 (ALT 250 FOR IP): Performed by: PHYSICIAN ASSISTANT

## 2022-08-09 PROCEDURE — 2580000003 HC RX 258: Performed by: NURSE PRACTITIONER

## 2022-08-09 PROCEDURE — 82948 REAGENT STRIP/BLOOD GLUCOSE: CPT

## 2022-08-09 PROCEDURE — 80048 BASIC METABOLIC PNL TOTAL CA: CPT

## 2022-08-09 PROCEDURE — 6360000002 HC RX W HCPCS

## 2022-08-09 PROCEDURE — 36415 COLL VENOUS BLD VENIPUNCTURE: CPT

## 2022-08-09 PROCEDURE — 94640 AIRWAY INHALATION TREATMENT: CPT

## 2022-08-09 PROCEDURE — 2060000000 HC ICU INTERMEDIATE R&B

## 2022-08-09 PROCEDURE — 99223 1ST HOSP IP/OBS HIGH 75: CPT | Performed by: INTERNAL MEDICINE

## 2022-08-09 PROCEDURE — 83735 ASSAY OF MAGNESIUM: CPT

## 2022-08-09 PROCEDURE — 93010 ELECTROCARDIOGRAM REPORT: CPT | Performed by: INTERNAL MEDICINE

## 2022-08-09 PROCEDURE — 84100 ASSAY OF PHOSPHORUS: CPT

## 2022-08-09 PROCEDURE — 2580000003 HC RX 258

## 2022-08-09 PROCEDURE — 85025 COMPLETE CBC W/AUTO DIFF WBC: CPT

## 2022-08-09 PROCEDURE — 76770 US EXAM ABDO BACK WALL COMP: CPT

## 2022-08-09 RX ORDER — CALCIUM GLUCONATE 10 MG/ML
1000 INJECTION, SOLUTION INTRAVENOUS ONCE
Status: COMPLETED | OUTPATIENT
Start: 2022-08-09 | End: 2022-08-09

## 2022-08-09 RX ORDER — CALCIUM GLUCONATE 10 MG/ML
1000 INJECTION, SOLUTION INTRAVENOUS ONCE
Status: COMPLETED | OUTPATIENT
Start: 2022-08-09 | End: 2022-08-10

## 2022-08-09 RX ORDER — DEXTROSE MONOHYDRATE 25 G/50ML
25 INJECTION, SOLUTION INTRAVENOUS ONCE
Status: DISCONTINUED | OUTPATIENT
Start: 2022-08-09 | End: 2022-08-09 | Stop reason: CLARIF

## 2022-08-09 RX ORDER — ROPINIROLE 1 MG/1
1 TABLET, FILM COATED ORAL NIGHTLY
Status: DISCONTINUED | OUTPATIENT
Start: 2022-08-09 | End: 2022-08-17 | Stop reason: HOSPADM

## 2022-08-09 RX ORDER — ALBUTEROL SULFATE 90 UG/1
2 AEROSOL, METERED RESPIRATORY (INHALATION) EVERY 4 HOURS PRN
Status: DISCONTINUED | OUTPATIENT
Start: 2022-08-09 | End: 2022-08-17 | Stop reason: HOSPADM

## 2022-08-09 RX ORDER — DOXEPIN HYDROCHLORIDE 50 MG/1
50 CAPSULE ORAL NIGHTLY PRN
Status: DISCONTINUED | OUTPATIENT
Start: 2022-08-09 | End: 2022-08-17 | Stop reason: HOSPADM

## 2022-08-09 RX ADMIN — OXYBUTYNIN CHLORIDE 5 MG: 5 TABLET, EXTENDED RELEASE ORAL at 22:50

## 2022-08-09 RX ADMIN — DOXEPIN HYDROCHLORIDE 50 MG: 50 CAPSULE ORAL at 22:47

## 2022-08-09 RX ADMIN — MONTELUKAST SODIUM 10 MG: 10 TABLET ORAL at 22:50

## 2022-08-09 RX ADMIN — SODIUM ZIRCONIUM CYCLOSILICATE 10 G: 5 POWDER, FOR SUSPENSION ORAL at 08:51

## 2022-08-09 RX ADMIN — ROPINIROLE HYDROCHLORIDE 1 MG: 1 TABLET, FILM COATED ORAL at 22:49

## 2022-08-09 RX ADMIN — SODIUM CHLORIDE: 9 INJECTION, SOLUTION INTRAVENOUS at 11:22

## 2022-08-09 RX ADMIN — OXYCODONE AND ACETAMINOPHEN 1 TABLET: 10; 325 TABLET ORAL at 22:47

## 2022-08-09 RX ADMIN — CALCIUM GLUCONATE 1000 MG: 10 INJECTION, SOLUTION INTRAVENOUS at 05:04

## 2022-08-09 RX ADMIN — SODIUM CHLORIDE, PRESERVATIVE FREE 10 ML: 5 INJECTION INTRAVENOUS at 22:50

## 2022-08-09 RX ADMIN — CARVEDILOL 50 MG: 25 TABLET, FILM COATED ORAL at 08:50

## 2022-08-09 RX ADMIN — OXYBUTYNIN CHLORIDE 5 MG: 5 TABLET, EXTENDED RELEASE ORAL at 08:49

## 2022-08-09 RX ADMIN — ONDANSETRON 4 MG: 2 INJECTION INTRAMUSCULAR; INTRAVENOUS at 22:48

## 2022-08-09 RX ADMIN — OXYCODONE AND ACETAMINOPHEN 1 TABLET: 10; 325 TABLET ORAL at 08:50

## 2022-08-09 RX ADMIN — SODIUM ZIRCONIUM CYCLOSILICATE 10 G: 5 POWDER, FOR SUSPENSION ORAL at 19:56

## 2022-08-09 RX ADMIN — OXYCODONE AND ACETAMINOPHEN 1 TABLET: 10; 325 TABLET ORAL at 00:47

## 2022-08-09 RX ADMIN — SODIUM CHLORIDE, PRESERVATIVE FREE 10 ML: 5 INJECTION INTRAVENOUS at 22:51

## 2022-08-09 RX ADMIN — DEXTROSE MONOHYDRATE 125 ML: 100 INJECTION, SOLUTION INTRAVENOUS at 06:04

## 2022-08-09 RX ADMIN — ONDANSETRON 4 MG: 2 INJECTION INTRAMUSCULAR; INTRAVENOUS at 00:14

## 2022-08-09 RX ADMIN — MOMETASONE FUROATE AND FORMOTEROL FUMARATE DIHYDRATE 2 PUFF: 100; 5 AEROSOL RESPIRATORY (INHALATION) at 17:55

## 2022-08-09 RX ADMIN — ROPINIROLE HYDROCHLORIDE 0.5 MG: 1 TABLET, FILM COATED ORAL at 13:02

## 2022-08-09 RX ADMIN — SODIUM ZIRCONIUM CYCLOSILICATE 10 G: 5 POWDER, FOR SUSPENSION ORAL at 13:03

## 2022-08-09 RX ADMIN — CARVEDILOL 50 MG: 25 TABLET, FILM COATED ORAL at 16:15

## 2022-08-09 RX ADMIN — Medication 4.5 MG: at 22:47

## 2022-08-09 RX ADMIN — SODIUM CHLORIDE: 9 INJECTION, SOLUTION INTRAVENOUS at 23:01

## 2022-08-09 RX ADMIN — INSULIN HUMAN 10 UNITS: 100 INJECTION, SOLUTION PARENTERAL at 05:24

## 2022-08-09 ASSESSMENT — ENCOUNTER SYMPTOMS
NAUSEA: 0
ABDOMINAL PAIN: 0
CHEST TIGHTNESS: 0
DIARRHEA: 1
SHORTNESS OF BREATH: 1
VOMITING: 0
COUGH: 1
WHEEZING: 0
RHINORRHEA: 0
NAUSEA: 1
COUGH: 0
EYE REDNESS: 0
CONSTIPATION: 0
BLOOD IN STOOL: 0
BACK PAIN: 0

## 2022-08-09 ASSESSMENT — PAIN DESCRIPTION - DIRECTION: RADIATING_TOWARDS: LEGS

## 2022-08-09 ASSESSMENT — PAIN SCALES - GENERAL
PAINLEVEL_OUTOF10: 0
PAINLEVEL_OUTOF10: 8
PAINLEVEL_OUTOF10: 8
PAINLEVEL_OUTOF10: 6
PAINLEVEL_OUTOF10: 0

## 2022-08-09 ASSESSMENT — PAIN - FUNCTIONAL ASSESSMENT
PAIN_FUNCTIONAL_ASSESSMENT: PREVENTS OR INTERFERES SOME ACTIVE ACTIVITIES AND ADLS
PAIN_FUNCTIONAL_ASSESSMENT: PREVENTS OR INTERFERES SOME ACTIVE ACTIVITIES AND ADLS
PAIN_FUNCTIONAL_ASSESSMENT: 0-10

## 2022-08-09 ASSESSMENT — PAIN DESCRIPTION - ORIENTATION
ORIENTATION: LOWER
ORIENTATION: LOWER;MID

## 2022-08-09 ASSESSMENT — PAIN DESCRIPTION - DESCRIPTORS
DESCRIPTORS: ACHING
DESCRIPTORS: ACHING

## 2022-08-09 ASSESSMENT — PAIN DESCRIPTION - FREQUENCY: FREQUENCY: CONTINUOUS

## 2022-08-09 ASSESSMENT — PAIN DESCRIPTION - ONSET: ONSET: ON-GOING

## 2022-08-09 ASSESSMENT — PAIN DESCRIPTION - LOCATION
LOCATION: BACK

## 2022-08-09 ASSESSMENT — PAIN DESCRIPTION - PAIN TYPE: TYPE: CHRONIC PAIN

## 2022-08-09 NOTE — PROGRESS NOTES
New Epoetin justin-epbx (Retacrit)     Radha Davenport is a 63 y.o. male. Pharmacy has reviewed the appropriateness of Epoetin justin-epbx (Retacrit) dosage per P&T protocol. Recent Labs     08/08/22  1909 08/09/22  0030 08/09/22  0626   HGB 8.6* 8.2* 7.8*     --  249     Epoetin justin-epbx (Retacrit) ordered dose: 5000 units MWF  Prior to arrival dose ISAAC (if available): n/a; Appropriate conversion: n/a    Hold Epoetin justin-epbx (Retacrit) for:   Hemoglobin >/= 10 g/dL, exception for patients who cannot receive transfusions. Pharmacists will automatically hold one dose by discontinuing the order, reentering to start for next scheduled dose, and contacting the provider. If two doses are held in a row the pharmacist will contact the provider to discuss dose reduction/ discontinuation. Plan:  Retacrit appropriate for hemoglobin level  Will monitor.      Jeffery Lennox, PharmD, BCPS 8/9/2022 12:14 PM

## 2022-08-09 NOTE — ED NOTES
Pt and vs reassessed. RR easy and unlabored. Pt resting in bed alert. Pt states he is feeling nauseous and having lower back pain. Pt states he takes  mg percocet at home and tylenol will not work. RN messaged admitting provider. Pt medicated per [facility] and denies any other needs. No distress noted.  Pt stable at this time     [facility]  08/09/22 0020

## 2022-08-09 NOTE — ED NOTES
Report given to JUJU Plascencia at this time. Breakfast tray ordered for patient. Updated on POC.      Taye Srivastava  08/09/22 0711       Taye Srivastava  08/09/22 1875

## 2022-08-09 NOTE — PLAN OF CARE
Problem: Discharge Planning  Goal: Discharge to home or other facility with appropriate resources  Outcome: Progressing  Flowsheets  Taken 8/9/2022 1135 by Barbara Gan RN  Discharge to home or other facility with appropriate resources:   Identify barriers to discharge with patient and caregiver   Arrange for needed discharge resources and transportation as appropriate   Identify discharge learning needs (meds, wound care, etc)   Refer to discharge planning if patient needs post-hospital services based on physician order or complex needs related to functional status, cognitive ability or social support system       Problem: Chronic Conditions and Co-morbidities  Goal: Patient's chronic conditions and co-morbidity symptoms are monitored and maintained or improved  Outcome: Progressing  Flowsheets (Taken 8/9/2022 1135)  Care Plan - Patient's Chronic Conditions and Co-Morbidity Symptoms are Monitored and Maintained or Improved: Monitor and assess patient's chronic conditions and comorbid symptoms for stability, deterioration, or improvement     Problem: Safety - Adult  Goal: Free from fall injury  Outcome: Progressing  Flowsheets (Taken 8/9/2022 1352)  Free From Fall Injury: Instruct family/caregiver on patient safety  Note: Pt remains free from falls. Bed alarm on, call light and personal items within reach. Problem: ABCDS Injury Assessment  Goal: Absence of physical injury  Outcome: Progressing  Flowsheets  Taken 8/9/2022 1352 by Barbara Gan RN  Absence of Physical Injury: Implement safety measures based on patient assessment      Problem: Skin/Tissue Integrity  Goal: Absence of new skin breakdown  Description: 1. Monitor for areas of redness and/or skin breakdown  2. Assess vascular access sites hourly  3. Every 4-6 hours minimum:  Change oxygen saturation probe site  4.   Every 4-6 hours:  If on nasal continuous positive airway pressure, respiratory therapy assess nares and determine need for appliance change or resting period. Outcome: Progressing  Note: Pt shows no signs of new skin breakdown. Q2 turns in place as patient allows. Education provided on the importance of repositioning. Problem: Pain  Goal: Verbalizes/displays adequate comfort level or baseline comfort level  Outcome: Progressing    Care plan reviewed with patient. Patient verbalize understanding of the plan of care and contribute to goal setting.

## 2022-08-09 NOTE — SIGNIFICANT EVENT
RN message about critical lab results-potassium of 7.2. Patient seen at bedside, he is comfortable sitting on the recliner chair, no complaints. He denies chest pain heart palpitations dizziness lightheadedness extreme weakness abdominal pain. On telemetry heart rate around 65, rhythm sinus. Stable vital signs, patient remains afebrile. Called the lab, turns out some degree of hemolysis is present. Started around shows potassium of 6.6. Given  units of regular insulin +10% dextrose, calcium gluconate 1 g IV bag.   Will update with nephrology service

## 2022-08-09 NOTE — ED NOTES
ED nurse-to-nurse bedside report    Chief Complaint   Patient presents with    Abnormal Lab     High K+ and Creat      LOC: alert and orientated to name, place, date  Vital signs   Vitals:    08/08/22 2217 08/08/22 2343 08/09/22 0018 08/09/22 0050   BP: 124/85 (!) 126/54 135/77 116/71   Pulse: 65 71 79 65   Resp: 16 20 21 15   Temp:       TempSrc:       SpO2: 98% 100% 97% 98%   Weight:       Height:          Pain:    Pain Interventions: percocet   Pain Goal: 3  Oxygen: Yes    Current needs required 4 L NC   Telemetry: Yes  LDAs:   Peripheral IV 08/08/22 Right Antecubital (Active)   Site Assessment Clean, dry & intact 08/09/22 0050   Line Status Infusing 08/09/22 0050   Phlebitis Assessment No symptoms 08/09/22 0050   Infiltration Assessment 0 08/09/22 0050     Continuous Infusions:    sodium chloride 100 mL/hr at 08/08/22 2052    dextrose      sodium chloride       Mobility: Requires assistance * 1  Brown Fall Risk Score: No flowsheet data found.   Fall Interventions: side rails up, call light in reach, wheels locked, bed in lowest position  Report given to: Zulma Ward RN  08/09/22 9625

## 2022-08-09 NOTE — PLAN OF CARE
Problem: Respiratory - Adult  Goal: Clear lung sounds  Outcome: Progressing   Continue with [facility]  Patient mutually agreed on goals.

## 2022-08-09 NOTE — ED NOTES
Bladder scan performed at this time with 54 mL of urine in the bladder      Daphene Brunner, RN  08/08/22 4965

## 2022-08-09 NOTE — ED NOTES
Pt transported to [facility] on cart in stable condition. Floor contacted before transported and spoke with Wilma.      Nida Milligan  08/09/22 4475

## 2022-08-09 NOTE — ED PROVIDER NOTES
[facility] Emergency [address] [place] [address]       Chief Complaint   Patient presents with    Abnormal Lab     High K+ and Creat       Nurses Notes reviewed and I agree except as noted in the HPI. HISTORY OF PRESENT ILLNESS    Alfonso Abraham jonathan 63 y.o. male who presents to the ED for evaluation of abnormal labs. Patient states he had routine lab work done and his nephrologist called him and told him he needed to come to the emergency room because the labs were abnormal.  Patient reports he has been weak and fatigued today. Had to call his wife to help him get from the car into the house after he went and had his labs drawn. Denies chest pain, shortness of breath. Has a history of ESRD not on dialysis. HPI was provided by the patient    REVIEW OF SYSTEMS     Review of Systems   Constitutional:  Positive for fatigue. Negative for chills and fever. HENT:  Negative for congestion, ear discharge, ear pain, postnasal drip and rhinorrhea. Eyes:  Negative for redness. Respiratory:  Negative for cough and chest tightness. Cardiovascular:  Negative for chest pain and leg swelling. Gastrointestinal:  Negative for abdominal pain, nausea and vomiting. Genitourinary:  Negative for difficulty urinating, dysuria, enuresis, flank pain and hematuria. Musculoskeletal:  Negative for back pain and joint swelling. Skin:  Negative for rash. Neurological:  Negative for dizziness, light-headedness, numbness and headaches. Psychiatric/Behavioral:  Negative for agitation, behavioral problems and confusion. All other systems negative except as noted.       PAST MEDICAL HISTORY     Past Medical History:   Diagnosis Date    Arthritis     Back problem     back pain-sees [facility] pain mgmt    Bladder disease     Dr. Desmond Peterson    CHF with unknown LVEF ([facility] 05/24/2021    Dr. George/CHF clinic    Chronic kidney disease     Constipation     Diabetes mellitus ([facility]     Dr. Hair Huynh    Hypertension Hypertensive emergency 04/11/2019    Hypertensive urgency 04/13/2019    Sleep apnea     has bipap-sees AZRA Kennedy CNP    Thyroid disease        SURGICALHISTORY      has a past surgical history that includes Appendectomy; knee surgery (Right, 1980's); Carpal tunnel release (Bilateral); Colonoscopy (2017); hernia repair; pr exploratory of abdomen (N/A, 2/5/2018); Dilatation, esophagus; Abdomen surgery; Cardiac catheterization; and Abdomen surgery (N/A, 3/25/2022). CURRENT MEDICATIONS       Previous Medications    ALBUTEROL SULFATE  (90 BASE) MCG/ACT INHALER    Inhale 2 puffs into the lungs every 6 hours as needed for Wheezing or Shortness of Breath    AMITRIPTYLINE (ELAVIL) 10 MG TABLET    Take 10 mg by mouth 2 times daily    AMLODIPINE (NORVASC) 10 MG TABLET    Take 1 tablet by mouth daily    ASPIRIN EC 81 MG EC TABLET    Take 1 tablet by mouth daily    AZELASTINE (ASTELIN) 137 MCG/SPRAY NASAL SPRAY    1 spray by Nasal route as needed. Use in each nostril as directed    BUMETANIDE (BUMEX) 1 MG TABLET    Take 1 tablet by mouth in the morning and 1 tablet before bedtime. CARVEDILOL (COREG) 25 MG TABLET    Take 2 tablets by mouth 2 times daily    CPAP MACHINE MISC    by Does not apply route Please change bipap to 18/14 cm H20. DOXEPIN (SINEQUAN) 25 MG CAPSULE    take 2 capsules by mouth at bedtime    FLUTICASONE FUROATE-VILANTEROL (BREO ELLIPTA) 200-25 MCG/INH AEPB INHALER    Inhale 1 puff into the lungs daily    GABAPENTIN (NEURONTIN) 300 MG CAPSULE    Take 1 capsule by mouth nightly for 30 days.     GLIMEPIRIDE (AMARYL) 4 MG TABLET    Take 4 mg by mouth daily     HYDRALAZINE (APRESOLINE) 25 MG TABLET    Take 1 tablet by mouth every 8 hours    IPRATROPIUM-ALBUTEROL (DUONEB) 0.5-2.5 (3) MG/3ML SOLN NEBULIZER SOLUTION    Inhale 3 mLs into the lungs every 4 hours as needed for Shortness of Breath    LEVOTHYROXINE (SYNTHROID) 175 MCG TABLET    Take 1 tablet by mouth Daily    MELATONIN 3 MG TABS TABLET    Take 1.5 tablets by mouth nightly as needed (Sleep)    MONTELUKAST (SINGULAIR) 10 MG TABLET    Take 1 tablet by mouth nightly    NALOXONE 4 MG/0.1ML LIQD NASAL SPRAY    1 spray by Nasal route as needed for Opioid Reversal    NITROGLYCERIN (NITROSTAT) 0.4 MG SL TABLET    Place 1 tablet under the tongue every 5 minutes as needed for Chest pain (up to 3 doses)    OXYBUTYNIN (DITROPAN-XL) 5 MG EXTENDED RELEASE TABLET    Take 1 tablet by mouth at bedtime    OXYCODONE-ACETAMINOPHEN (PERCOCET)  MG PER TABLET    Take 1 tablet by mouth every 8 hours as needed for Pain for up to 30 days. Intended supply: 30 days    POTASSIUM CHLORIDE (K-TAB) 10 MEQ EXTENDED RELEASE TABLET    Take 2 tablets by mouth in the morning. ROPINIROLE (REQUIP) 0.5 MG TABLET    take 1 tablet by mouth IN THE AFTERNOON AND 2 TABLETS AT NIGHT. ROPINIROLE (REQUIP) 1 MG TABLET    Take 1 pill in afternoon and 2 at bedtime    TAMSULOSIN (FLOMAX) 0.4 MG CAPSULE    Take 1 capsule by mouth nightly       ALLERGIES     is allergic to shellfish-derived products, pcn [penicillins], succinylcholine, sulfa antibiotics, morphine, and tape [adhesive tape]. FAMILY HISTORY     He indicated that his mother is . He indicated that his father is . He indicated that all of his four brothers are alive. He indicated that the status of his paternal grandmother is unknown. He indicated that the status of his neg hx is unknown.   family history includes Arthritis in his brother; Cancer in his father and mother; Diabetes in his paternal grandmother; Heart Attack in his brother; Heart Disease in his father; Prostate Cancer in his brother; Stroke in his brother.     SOCIAL HISTORY       Social History     Socioeconomic History    Marital status:      Spouse name: Nish Lim    Number of children: 2    Years of education: Not on file    Highest education level: Not on file   Occupational History    Not on file   Tobacco Use    Smoking status: Former     Types: Pipe    Smokeless tobacco: Current     Types: Chew    Tobacco comments:     quit pipe over 30 yrs ago   Vaping Use    Vaping Use: Never used   Substance and Sexual Activity    Alcohol use: No     Alcohol/week: 0.0 standard drinks     Comment: quit    Drug use: No    Sexual activity: Yes   Other Topics Concern    Not on file   Social History Narrative    Not on file     Social Determinants of Health     Financial Resource Strain: Not on file   Food Insecurity: Not on file   Transportation Needs: Not on file   Physical Activity: Not on file   Stress: Not on file   Social Connections: Not on file   Intimate Partner Violence: Not on file   Housing Stability: Not on file       PHYSICAL EXAM     INITIAL VITALS:  height is 6' 1\" (1.854 m) and weight is 329 lb (149.2 kg) (abnormal). His oral temperature is 98.7 °F (37.1 °C). His blood pressure is 121/68 and his pulse is 67. His respiration is 14 and oxygen saturation is 100%. Physical Exam  Vitals and nursing note reviewed. Constitutional:       General: He is not in acute distress. Appearance: Normal appearance. He is well-developed. He is ill-appearing. He is not diaphoretic. HENT:      Head: Normocephalic and atraumatic. Nose: Nose normal.      Mouth/Throat:      Mouth: Mucous membranes are moist.      Pharynx: Oropharynx is clear. Eyes:      General:         Right eye: No discharge. Left eye: No discharge. Conjunctiva/sclera: Conjunctivae normal.   Neck:      Trachea: No tracheal deviation. Cardiovascular:      Rate and Rhythm: Normal rate and regular rhythm. Pulses: Normal pulses. Heart sounds: Normal heart sounds. No murmur heard. No gallop. Comments: Normal capillary refill  Pulmonary:      Effort: Pulmonary effort is normal. No respiratory distress. Breath sounds: Normal breath sounds. No stridor. Abdominal:      General: Bowel sounds are normal.      Palpations: Abdomen is soft.    Musculoskeletal: General: No tenderness or deformity. Normal range of motion. Cervical back: Normal range of motion. Skin:     General: Skin is warm and dry. Capillary Refill: Capillary refill takes less than 2 seconds. Coloration: Skin is not pale. Findings: No erythema or rash. Neurological:      General: No focal deficit present. Mental Status: He is alert and oriented to person, place, and time. Cranial Nerves: No cranial nerve deficit. Psychiatric:         Behavior: Behavior normal.       DIFFERENTIAL DIAGNOSIS:   Renal failure, hyperkalemia    DIAGNOSTIC RESULTS     EKG: All EKG's are interpreted by the Emergency Department Physician who eithersigns or Co-signs this chart in the absence of a cardiologist.        RADIOLOGY: non-plainfilm images(s) such as CT, Ultrasound and MRI are read by the radiologist.  Plain radiographic images are visualized and preliminarily interpreted by the emergency physician unless otherwise stated below.   No orders to display         LABS:   Labs Reviewed   CBC WITH AUTO DIFFERENTIAL - Abnormal; Notable for the following components:       Result Value    RBC 3.34 (*)     Hemoglobin 8.6 (*)     Hematocrit 28.6 (*)     MCH 25.7 (*)     MCHC 30.1 (*)     RDW-CV 17.5 (*)     RDW-SD 54.6 (*)     MPV 8.3 (*)     Lymphocytes Absolute 0.8 (*)     Eosinophils Absolute 0.5 (*)     All other components within normal limits   COMPREHENSIVE METABOLIC PANEL - Abnormal; Notable for the following components:    Glucose 65 (*)     Creatinine 6.6 (*)     BUN 72 (*)     Potassium 7.0 (*)     Total Protein 8.4 (*)     ALT 9 (*)     All other components within normal limits   MAGNESIUM - Abnormal; Notable for the following components:    Magnesium 2.5 (*)     All other components within normal limits   TROPONIN - Abnormal; Notable for the following components:    Troponin T 0.035 (*)     All other components within normal limits   GLOMERULAR FILTRATION RATE, ESTIMATED - Abnormal; Notable for the following components:    Est, Glom Filt Rate 9 (*)     All other components within normal limits   BASIC METABOLIC PANEL W/ REFLEX TO MG FOR LOW K - Abnormal; Notable for the following components:    Potassium reflex Magnesium 6.8 (*)     Glucose 68 (*)     BUN 69 (*)     Creatinine 6.6 (*)     All other components within normal limits   GLOMERULAR FILTRATION RATE, ESTIMATED - Abnormal; Notable for the following components:    Est, Glom Filt Rate 9 (*)     All other components within normal limits   HEMOGLOBIN AND HEMATOCRIT - Abnormal; Notable for the following components:    Hemoglobin 8.2 (*)     Hematocrit 27.5 (*)     All other components within normal limits   POTASSIUM - Abnormal; Notable for the following components:    Potassium 6.6 (*)     All other components within normal limits   POCT GLUCOSE - Abnormal; Notable for the following components:    POC Glucose 164 (*)     All other components within normal limits   BRAIN NATRIURETIC PEPTIDE   ANION GAP   OSMOLALITY   ANION GAP   OSMOLALITY   CBC WITH AUTO DIFFERENTIAL   BASIC METABOLIC PANEL W/ REFLEX TO MG FOR LOW K   MAGNESIUM   PHOSPHORUS   SPECIMEN REJECTION   POCT GLUCOSE   POCT GLUCOSE   POCT GLUCOSE   POCT GLUCOSE   POCT GLUCOSE   POCT GLUCOSE   POCT GLUCOSE   POCT GLUCOSE   POCT GLUCOSE   POCT GLUCOSE   POCT GLUCOSE       EMERGENCY DEPARTMENT COURSE:   Vitals:    Vitals:    08/09/22 0050 08/09/22 0112 08/09/22 0336 08/09/22 0443   BP: 116/71 (!) 119/53 103/61 121/68   Pulse: 65 64 61 67   Resp: 15 17 16 14   Temp:       TempSrc:       SpO2: 98% 100% 98% 100%   Weight:       Height:                                      Internal Administration   First Dose COVID-19, PFIZER PURPLE top, DILUTE for use, (age 15 y+), 30mcg/0.3mL  03/18/2021   Second Dose COVID-19, PFIZER PURPLE top, DILUTE for use, (age 15 y+), 30mcg/0.3mL   04/08/2021       Last COVID Lab SARS-CoV-2 (no units)   Date Value   03/10/2021 Not Detected     SARS-CoV-2, PCR (no units)   Date Value   03/19/2022 Not Detected     SARS-CoV-2, NAAT (no units)   Date Value   04/20/2022 NOT  DETECTED            MDM      Patient seen evaluate in the emergency room for abnormal labs. Appropriate labs and imaging are ordered and reviewed. Patient has an elevated potassium and his creatinine has doubled. He started on potassium lowering medications including calcium, dextrose and insulin, Kayexalate. EKG is unchanged. Discussed the case with the hospitalist, he agrees to admit for further evaluation. Patient is agreeable and is admitted in stable condition    No notes of  Admission Criteria type on file.       Medications   0.9 % sodium chloride infusion ( IntraVENous New Bag 8/8/22 2052)   glucose chewable tablet 16 g (has no administration in time range)   dextrose bolus 10% 125 mL (has no administration in time range)     Or   dextrose bolus 10% 250 mL (has no administration in time range)   glucagon (rDNA) injection 1 mg (has no administration in time range)   dextrose 10 % infusion (has no administration in time range)   albuterol sulfate HFA (PROVENTIL;VENTOLIN;PROAIR) 108 (90 Base) MCG/ACT inhaler 2 puff (has no administration in time range)   sodium chloride flush 0.9 % injection 5-40 mL (has no administration in time range)   sodium chloride flush 0.9 % injection 5-40 mL (has no administration in time range)   0.9 % sodium chloride infusion (has no administration in time range)   enoxaparin Sodium (LOVENOX) injection 30 mg ( SubCUTAneous Automatically Held 8/12/22 0900)   ondansetron (ZOFRAN-ODT) disintegrating tablet 4 mg ( Oral See Alternative 8/9/22 0014)     Or   ondansetron (ZOFRAN) injection 4 mg (4 mg IntraVENous Given 8/9/22 0014)   polyethylene glycol (GLYCOLAX) packet 17 g (has no administration in time range)   acetaminophen (TYLENOL) tablet 650 mg (has no administration in time range)     Or   acetaminophen (TYLENOL) suppository 650 mg (has no administration in time range) oxyCODONE-acetaminophen (PERCOCET)  MG per tablet 1 tablet (1 tablet Oral Given 8/9/22 0047)   aspirin EC tablet 81 mg ( Oral Automatically Held 8/12/22 0900)   nitroGLYCERIN (NITROSTAT) SL tablet 0.4 mg (has no administration in time range)   montelukast (SINGULAIR) tablet 10 mg (has no administration in time range)   mometasone-formoterol (DULERA) 100-5 MCG/ACT inhaler 2 puff (has no administration in time range)   rOPINIRole (REQUIP) tablet 0.5 mg (has no administration in time range)   carvedilol (COREG) tablet 50 mg (has no administration in time range)   melatonin tablet 4.5 mg (has no administration in time range)   tamsulosin (FLOMAX) capsule 0.4 mg ( Oral Automatically Held 8/14/22 2100)   levothyroxine (SYNTHROID) tablet 175 mcg (has no administration in time range)   oxybutynin (DITROPAN-XL) extended release tablet 5 mg (has no administration in time range)   rOPINIRole (REQUIP) tablet 1 mg (has no administration in time range)   calcium gluconate 1000 mg in sodium chloride 100 mL (1,000 mg IntraVENous New Bag 8/9/22 0504)   calcium gluconate 10 % injection 1,000 mg (1,000 mg IntraVENous Given 8/8/22 2010)   insulin regular (HUMULIN R;NOVOLIN R) injection 10 Units (10 Units IntraVENous Given 8/8/22 2010)     And   dextrose bolus 10% 250 mL (0 mLs IntraVENous Stopped 8/8/22 2050)   albuterol (PROVENTIL) nebulizer solution 10 mg (10 mg Nebulization Given 8/8/22 1959)   sodium bicarbonate 8.4 % injection 50 mEq (50 mEq IntraVENous Given 8/8/22 2010)   sodium polystyrene (KAYEXALATE) 15 GM/60ML suspension 15 g (15 g Oral Given 8/8/22 2010)   ondansetron (ZOFRAN) injection 4 mg (4 mg IntraVENous Given 8/8/22 2051)   insulin regular (HUMULIN R;NOVOLIN R) injection 10 Units (10 Units IntraVENous Given 8/9/22 0524)       Please note that the patient was evaluated during a pandemic. All efforts were made for HIPPA compliance as well as provision of appropriate care.       Patient was seen independently by myself. The patient's final impression and disposition and plan was determined by myself. Strict return precautions and follow up instructions were discussed with the patient prior to discharge, with which the patient agrees. Physical assessment findings, diagnostic testing(s) if applicable, and vital signs reviewed with patient/patient representative. Questions answered. Medications asdirected, including OTC medications for supportive care. Education provided on medications. Differential diagnosis(s) discussed with patient/patient representative. Home care/self care instructions reviewed withpatient/patient representative. Patient is to follow-up with family care provider in 2-3 days if no improvement. Patient is to go to the emergency department if symptoms worsen. Patient/patient representative isaware of care plan, questions answered, verbalizes understanding and is in agreement. CRITICAL CARE:   None    CONSULTS:  Hospitalist    PROCEDURES:  None    FINAL IMPRESSION     1. Acute renal failure superimposed on stage 5 chronic kidney disease, not on chronic dialysis, unspecified acute renal failure type ([facility]    2. Hyperkalemia          DISPOSITION/PLAN   DISPOSITION Admitted 08/08/2022 09:47:08 PM      PATIENT REFERREDTO:  No follow-up provider specified.     DISCHARGE MEDICATIONS:  New Prescriptions    No medications on file       (Please note that portions of this note were completed with a voice recognition program.  Efforts were made to edit the dictations but occasionally words are mis-transcribed.)         SAHIL Rolon CNP, APRN - CNP  08/09/22 6723

## 2022-08-09 NOTE — PROGRESS NOTES
Pt admitted to  [phone] in a wheelchair. Complaints: None. IV normal saline infusing into the forearm right, condition patent and no redness at a rate of 100 mls/ hour with about 900 mls in the bag still. IV site free of s/s of infection or infiltration. Vital signs obtained. Assessment and data collection initiated. Two nurse skin assessment performed by [address] [place] [address] and Ashlie RN. Oriented to room. Policies and procedures for [facility] explained. All questions answered with no further questions at this time. Fall prevention and safety brochure discussed with patient. Bed alarm on. Call light in reach. Would you like your Primary Care Physician notified? no  The best day to schedule a follow up Dr appointment is:  Monday, Tuesday, Wednesday, Thursday, and Friday a.m.

## 2022-08-09 NOTE — H&P
History & Physical        Patient:  Rogers Silverman  YOB: 1960    MRN: [id number]     Acct: [de-identified]    PCP: Betsy Cooks, DO    Date of Admission: 8/8/2022    Date of Service: Pt seen/examined on 08/08/22  and Admitted to Inpatient with expected LOS greater than two midnights due to medical therapy. Chief Complaint:  abnormal lab    Assessment and plan. Moderate hyperkalemia. Likely due to CAROLE on CKD 5. Patient denies chest pain heart palpitations dizziness lightheadedness EKG is negative for acute ischemic/arrhythmic changes  o,n arrival potassium was 7.0, given insulin regular 10 units, albuterol 10 mg inhalation, calcium gluconate and Kayexalate  -We will repeat potassium every 4 hours, if remains elevated or does not respond to temporizing treatment, considering nephrology stat consult for possible dialysis. -Nephrology consulted. CAROLE on CKD V.   acutely worsening.,  deCreased urine output, creatinine elevated to 6.6, BUN 72 . diabetes longstanding hypertension. Etiology of acute worsening is unknown, patient denies taking nephrotoxic medications including NSAIDs and he states he is being compliant with taking home medications on time.  -Scanning bladder for urinary retention  -Nephrology consulted. Patient may need start HD session 7  CHF, not in exacerbation, HFpEF, EF 31%, diastolic type NYHA class II. No worsening dyspnea, no worsening peripheral pitting edema although patient complains of weakness. On arrival BNP is not elevated. X-ray negative for pulmonary edema  -Monitoring vital signs, strict PEDRO's Daily weights, daily BMP magnesium  -Continue home medications-Coreg  Diabetes mellitus  -Hypoglycemic treatments, POC glucose checks AC plus at bedtime  -Sliding scale scale.-Patient stay glucose target level 140-180. If persistent hyperglycemia, considering adding basal Lantus overnight.  [facility]  Chronic respiratory failure with hypoxia. Secondary to CHF HFrEF, copd, sleep apnea. baseline on oxygen supplementation 4 L at home at rest and at walking. O2 sat 98%  -We will continue oxygen supplementation to 4 L which is baseline  -Patient is on telemetry pulse ox  Primary hypertension  -Resume hydralazine 25 3 times daily, amlodipine 10 mg  Moderate normocytic anemia of chronic disease. Likely due to CKD 5. Signs of acute bleeding, no hematuria/black stools noted. Globin 8.6 hematocrit 29.  -Monitoring, daily CBC  COPD. Not in acute exacerbation  -Noncompliant with home inhalers.   -Albuterol as needed for wheezing, Dulera 2 puffs twice daily, Singulair p.o. nightly 10 mg  History of restless leg syndrome  -resumed Requip 0.5   Depression  -Resumed gabapentin 600 mg daily, doxepin  Chronic back pain  -Resumed Percocet as needed every 6 hours  Obesity, BMI 43.4. He is educated and informed regarding weight loss, healthy diet, physical activity  History of hernia repair. Noted on physical exam  hxof BPH /bladder spasm . resumed Flomax and oxybutynin  Hypothyroidism. Resumed Synthroid  Sleep apnea. CPAP nightly          History Of Present Illness:    63 y.o. male who presented to [facility] with abnormal labs. Patient went to regular scheduled weekly labs drawn and call back from nephrology service about abnormal labs and sent to ED [facility]. Patient complains fatigue which is worse than used to be. Denies chest pain heart palpitations dizziness lightheadedness fever chills productive cough abdominal pain dysuria. In ED, found potassium 7.0, BUN 72, creatinine 6.6, anion gap 16, GFR 9, magnesium 2.5, hemoglobin 8.6, hematocrit 28, MCV 85, troponin 0.0 35  EKG is negative for acute ischemic changes.     And admitted for further evaluation and management    Past Medical History:          Diagnosis Date    Arthritis     Back problem     back pain-sees [facility] pain mgmt    Bladder disease     Dr. Selwyn Almanzar    CHF with unknown LVEF ([facility] [address] 05/24/2021    Dr. George/CHF clinic    Chronic kidney disease     Constipation     Diabetes mellitus ([facility] [address]     Dr. Ryder Connors    Hypertension     Hypertensive emergency 04/11/2019    Hypertensive urgency 04/13/2019    Sleep apnea     has bipap-sees AZRA Olmedo CNP    Thyroid disease        Past Surgical History:          Procedure Laterality Date    ABDOMEN SURGERY      ABDOMEN SURGERY N/A 3/25/2022    ABDOMINAL LYSIS OF ADHESIONS, EXPLANT OF INFECTED MESH performed by Jennifer Pride MD at [address] [facility]      no stents    CARPAL TUNNEL RELEASE Bilateral     COLONOSCOPY  2017    Dr. Cesario Duverney, [address]      x3 surgeries with 14 repairs    KNEE SURGERY Right 1980's    cartilage    [place] EXPLORATORY OF ABDOMEN N/A 2/5/2018    ABDOMINAL EXPLORATION WITH LYSIS OF COMPLICATED ADHESIONS WITH AN EXTENDED RIGHT COLON RESECTION [facility]       Medications Prior to Admission:      Prior to Admission medications    Medication Sig Start Date End Date Taking? Authorizing Provider   naloxone 4 MG/0.1ML LIQD nasal spray 1 spray by Nasal route as needed for Opioid Reversal 7/27/22   SAHIL Negro CNP   rOPINIRole (REQUIP) 0.5 MG tablet take 1 tablet by mouth IN THE AFTERNOON AND 2 TABLETS AT NIGHT. 7/25/22   Ulysses Lynch PA-C   potassium chloride (K-TAB) 10 MEQ extended release tablet Take 2 tablets by mouth in the morning. 7/22/22   Elder Cisneros MD   bumetanide (BUMEX) 1 MG tablet Take 1 tablet by mouth in the morning and 1 tablet before bedtime. 7/22/22   Elder Cisneros MD   gabapentin (NEURONTIN) 300 MG capsule Take 1 capsule by mouth nightly for 30 days. 7/22/22 8/21/22  SAHIL Negro CNP   oxyCODONE-acetaminophen (PERCOCET)  MG per tablet Take 1 tablet by mouth every 8 hours as needed for Pain for up to 30 days.  Intended supply: 30 days 7/21/22 8/20/22  SAHIL Negro - CNP   doxepin (SINEQUAN) 25 MG capsule take 2 capsules by mouth at bedtime 6/13/22   Noreen Bran PA-C   CPAP Machine MISC by Does not apply route Please change bipap to 18/14 cm H20. 5/31/22   Noreen Bran PA-C   carvedilol (COREG) 25 MG tablet Take 2 tablets by mouth 2 times daily 5/24/22   SAHIL Momin - CNP   oxybutynin (DITROPAN-XL) 5 MG extended release tablet Take 1 tablet by mouth at bedtime 4/24/22   Alfonso Constantino DO   amitriptyline (ELAVIL) 10 MG tablet Take 10 mg by mouth 2 times daily    Historical Provider, MD   tamsulosin (FLOMAX) 0.4 MG capsule Take 1 capsule by mouth nightly 4/12/22 4/12/23  Thad Gonzales MD   hydrALAZINE (APRESOLINE) 25 MG tablet Take 1 tablet by mouth every 8 hours 3/30/22   Reymundo Bragg,    levothyroxine (SYNTHROID) 175 MCG tablet Take 1 tablet by mouth Daily 3/31/22   Lee Vargas, DO   amLODIPine (NORVASC) 10 MG tablet Take 1 tablet by mouth daily  Patient not taking: Reported on 8/4/2022 3/31/22   Lee Vargas DO   melatonin 3 MG TABS tablet Take 1.5 tablets by mouth nightly as needed (Sleep) 3/30/22 8/4/22  Lee Vargas, DO   ipratropium-albuterol (DUONEB) 0.5-2.5 (3) MG/3ML SOLN nebulizer solution Inhale 3 mLs into the lungs every 4 hours as needed for Shortness of Breath  Patient taking differently: Inhale 1 vial into the lungs every 4 hours as needed for Shortness of Breath Usually uses at night 3/21/22   Christa Anne,    Fluticasone furoate-vilanterol (BREO ELLIPTA) 200-25 MCG/INH AEPB inhaler Inhale 1 puff into the lungs daily  Patient not taking: Reported on 8/4/2022 2/15/22   SAHIL Arrington CNP   montelukast (SINGULAIR) 10 MG tablet Take 1 tablet by mouth nightly 2/15/22   SAHIL Arrington CNP   albuterol sulfate  (90 Base) MCG/ACT inhaler Inhale 2 puffs into the lungs every 6 hours as needed for Wheezing or Shortness of Breath 2/15/22   SAHIL Arrington CNP   rOPINIRole (REQUIP) 1 MG tablet Take 1 pill in afternoon and 2 at bedtime 12/28/21   Alejandro Tim PA-C   aspirin EC 81 MG EC tablet Take 1 tablet by mouth daily 9/10/21   SAHIL Curiel CNP   nitroGLYCERIN (NITROSTAT) 0.4 MG SL tablet Place 1 tablet under the tongue every 5 minutes as needed for Chest pain (up to 3 doses) 6/7/21   SAHIL Curiel CNP   glimepiride (AMARYL) 4 MG tablet Take 4 mg by mouth daily  1/16/21   Historical Provider, MD   azelastine (ASTELIN) 137 MCG/SPRAY nasal spray 1 spray by Nasal route as needed. Use in each nostril as directed    Historical Provider, MD       Allergies:  Shellfish-derived products, Pcn [penicillins], Succinylcholine, Sulfa antibiotics, Morphine, and Tape [adhesive tape]    Social History:      The patient currently lives wit family    TOBACCO:   reports that he has quit smoking. His smoking use included pipe. His smokeless tobacco use includes chew. ETOH:   reports no history of alcohol use. Family History:       Reviewed in detail and negative for DM, CAD, Cancer, CVA. Positive as follows:        Problem Relation Age of Onset    Cancer Mother         breast    Heart Disease Father         bladder, lung    Cancer Father     Stroke Brother     Heart Attack Brother     Prostate Cancer Brother     Diabetes Paternal Grandmother     Arthritis Brother     High Blood Pressure Neg Hx        Diet:  No diet orders on file    REVIEW OF SYSTEMS:   All 13 review of symptoms are negative except as listed above in the HPI. PHYSICAL EXAM:    BP (!) 140/70   Pulse 66   Temp 98.7 °F (37.1 °C) (Oral)   Resp 16   Ht 6' 1\" (1.854 m)   Wt (!) 329 lb (149.2 kg)   SpO2 98%   BMI 43.41 kg/m²     General appearance:  No apparent distress, peers older than stated age, cooperative, looks chronically ill, obese looking. HEENT:  Normal cephalic, atraumatic without obvious deformity. Pupils equal, round, and reactive to light. Extra ocular muscles intact. Conjunctivae/corneas clear. Neck: Supple, with full range of motion.  No jugular venous distention. Trachea midline. Respiratory:  Normal respiratory effort. Clear to auscultation, bilaterally without Rales/Wheezes/Rhonchi. Cardiovascular:  Regular rate and rhythm with normal S1/S2 without murmurs, rubs or gallops. +1 pitting edema below knee  Abdomen: Soft, non-tender, non-distended with normal bowel sounds. Musculoskeletal:  No clubbing, cyanosis or edema bilaterally. Full range of motion without deformity. neurologic:  Neurovascularly intact without any focal sensory/motor deficits. Cranial nerves: II-XII intact, grossly non-focal.  Psychiatric:  Alert and oriented, thought content appropriate, normal insight  Capillary Refill: Brisk,< 3 seconds       Labs:     Recent Labs     08/08/22 1048 08/08/22  1909   WBC  --  6.6   HGB 8.4* 8.6*   HCT 27.2* 28.6*   PLT  --  284     Recent Labs     08/08/22 1048 08/08/22 1909    138   K 6.7* 7.0*    99   CO2 21* 23   BUN 72* 72*   CREATININE 6.5* 6.6*   CALCIUM 9.4 9.6     Recent Labs     08/08/22  1909   AST 9   ALT 9*   BILITOT 0.3   ALKPHOS 91     No results for input(s): INR in the last 72 hours. No results for input(s): [place] in the last 72 hours. Urinalysis:      Lab Results   Component Value Date/Time    NITRU NEGATIVE 03/23/2022 04:45 AM    WBCUA 5-9 03/23/2022 04:45 AM    BACTERIA NONE SEEN 03/23/2022 04:45 AM    RBCUA 25-50 03/23/2022 04:45 AM    BLOODU MODERATE 03/23/2022 04:45 AM    SPECGRAV 1.015 03/23/2022 04:45 AM    GLUCOSEU 500 04/13/2021 08:46 AM       Intake & Output:  No intake/output data recorded.   I/O this shift:  In: 250 [I.V.:250]  Out: -       Radiology:     CXR: I have reviewed the CXR with the following interpretation:   EKG:  I have reviewed the EKG with the following interpretation:     No orders to display        DVT prophylaxis: {dvt prophylaxis:42087    Code Status: Prior      PT/OT Eval Status:     Disposition:    There are no active hospital problems to display for this patient. Thank you Abhijeet Anderson DO for the opportunity to be involved in this patient's care.   Was discussed with Sherrie Ramon  Electronically signed by Ashutosh Church DO , PGY-2 on 8/8/2022 at 9:11 PM

## 2022-08-09 NOTE — ED NOTES
Pt and vs reassessed. RR easy and unlabored. Ambulated to recliner at bedside with assistance. Pt medicated per [facility] and denies any other needs. No distress noted.  Pt stable at this time     Gee RoseDoylestown Health  08/09/22 [id number]

## 2022-08-09 NOTE — CARE COORDINATION
8/9/22, 1:01 PM EDT  DISCHARGE PLANNING EVALUATION:    Nilsa Grady       Admitted: 8/8/2022/ [facility] day: 1   Location: [facility]/013A Reason for admit: Hyperkalemia [E87.5]  Acute renal failure superimposed on stage 5 chronic kidney disease, not on chronic dialysis, unspecified acute renal failure type ([address] [N17.9, N18.5]   PMH:  has a past medical history of Arthritis, Back problem, Bladder disease, CHF with unknown LVEF ([address] Chronic kidney disease, Constipation, Diabetes mellitus ([address] Hypertension, Hypertensive emergency, Hypertensive urgency, Sleep apnea, and Thyroid disease. Barriers to Discharge:    CAROLE, Hyperkalemia/Fall x2  History: CHF  K+ 6.3, Creatinine 6.3, H 7.8  IVF, IV Bicarb, Kayexalate, IV Calcium Gluconate, Oxygen 2L    PCP: Jessica Velazquez DO  Readmission Risk Score: 23.4%  Patient's Healthcare Decision Maker: Named in [address] [place] [address]    Patient Goals/Plan/Treatment Preferences: plans home w spouse Nonah Screws; has nebulizer, SR HME home oxygen 2-6L, BIPAP 1L bleed in at night; current CHF Clinic; monitor therapy needs; needs meet/greet, readmission interview  Transportation/Food Security/Housekeeping Addressed:  No issues identified.

## 2022-08-09 NOTE — ED NOTES
Rechecked blood glucose after administration of insulin and pt blood sugar 52. D10 started per protocol.       Tran Patrick  08/09/22 0101

## 2022-08-09 NOTE — ED NOTES
Pt resting on cot with watching TV at this time, pt remains alert and oriented, respirations are equal and unlabored.  Pt denies needs at this time, will continue to  [address] Hector Nieto RN  08/08/22 1001

## 2022-08-09 NOTE — SIGNIFICANT EVENT
ED RN message regarding small amount of blood or red blood when patient went to bathroom. Vitals are stable, she denies dizziness lightheadedness. Will do we will check hemoglobin hematocrit, if hemoglobin hematocrit drops more than one-point, will treat as acute  GI bleeding, will keep n.p.o. and considering GI consult  for possible scope  Holding aspirin and Lovenox.

## 2022-08-09 NOTE — CONSULTS
[facility]          [facility]        [address]        [place], [address]        [phone]           Inpatient Initial consult note         8/9/2022 1:25 PM    Patient Name:   Radha Silvestre:    1960  Primary Care Physician:  Ricky Mo DO     History Obtained From:  patient, spouse, electronic medical record     Consultation requested by : Claudetta Piano, PA-C    requested for  : Evaluation of  hyperkalemia     History of presentingillness   Dipika Baker is a 63 y.o.   male with Past Medical History as stated below presented with a chief complaint of Abnormal Lab (High K+ and Creat)   on 8/8/2022 . Pt presented to the ED for evaluation of abnormal labs. Patient had routine lab work done and his nephrologist called him and told him he needed to come to the emergency room because the labs were abnormal.  Patient reported he had been weak and fatigued at ED. Had to call his wife to help him get from the car into the house after he went and had his labs drawn. Potassium was 6.8 in the ED with an was admitted. He is on home oxygen. Past History      Past Medical History:   Diagnosis Date    Arthritis     Back problem     back pain-sees [facility] pain mgmt    Bladder disease     Dr. Eleonora Hayden    CHF with unknown LVEF ([facility] [address] 05/24/2021    Dr. George/CHF clinic    Chronic kidney disease     Constipation     Diabetes mellitus ([facility] [address]     Dr. Jodi Alfredo    Hypertension     Hypertensive emergency 04/11/2019    Hypertensive urgency 04/13/2019    Sleep apnea     has bipap-sees AZRA Calderon CNP    Thyroid disease      Past Surgical History:   Procedure Laterality Date    ABDOMEN SURGERY      ABDOMEN SURGERY N/A 3/25/2022    ABDOMINAL LYSIS OF ADHESIONS, EXPLANT OF INFECTED MESH performed by Yesica Root MD at [facility]      no stents    CARPAL TUNNEL RELEASE Bilateral     COLONOSCOPY  2017    Dr. Emilio Wei, ESOPHAGUS      HERNIA REPAIR      x3 surgeries with 14 repairs    KNEE SURGERY Right 1980's    cartilage    TX EXPLORATORY OF ABDOMEN N/A 2/5/2018    ABDOMINAL EXPLORATION WITH LYSIS OF COMPLICATED ADHESIONS WITH AN EXTENDED RIGHT COLON RESECTION performed by Bashir Lopez MD at [address] [place] [address] History     Socioeconomic History    Marital status:      Spouse name: Nya Josue    Number of children: 2    Years of education: Not on file    Highest education level: Not on file   Occupational History    Not on file   Tobacco Use    Smoking status: Former     Types: Pipe    Smokeless tobacco: Current     Types: Chew    Tobacco comments:     quit pipe over 30 yrs ago   Vaping Use    Vaping Use: Never used   Substance and Sexual Activity    Alcohol use: No     Alcohol/week: 0.0 standard drinks     Comment: quit    Drug use: No    Sexual activity: Yes   Other Topics Concern    Not on file   Social History Narrative    Not on file     Social Determinants of Health     Financial Resource Strain: Not on file   Food Insecurity: Not on file   Transportation Needs: Not on file   Physical Activity: Not on file   Stress: Not on file   Social Connections: Not on file   Intimate Partner Violence: Not on file   Housing Stability: Not on file     Family History   Problem Relation Age of Onset    Cancer Mother         breast    Heart Disease Father         bladder, lung    Cancer Father     Stroke Brother     Heart Attack Brother     Prostate Cancer Brother     Diabetes Paternal Grandmother     Arthritis Brother     High Blood Pressure Neg Hx      Medications & Allergies      Prior to Admission medications    Medication Sig Start Date End Date Taking? Authorizing Provider   naloxone 4 MG/0.1ML LIQD nasal spray 1 spray by Nasal route as needed for Opioid Reversal 7/27/22   Usha , APRN - CNP   rOPINIRole (REQUIP) 0.5 MG tablet take 1 tablet by mouth IN THE AFTERNOON AND 2 TABLETS AT NIGHT.  7/25/22   Page Cough, LIVAN   potassium chloride (K-TAB) 10 MEQ extended release tablet Take 2 tablets by mouth in the morning. 7/22/22   Nat Almeida MD   bumetanide (BUMEX) 1 MG tablet Take 1 tablet by mouth in the morning and 1 tablet before bedtime. 7/22/22   Nat Almeida MD   gabapentin (NEURONTIN) 300 MG capsule Take 1 capsule by mouth nightly for 30 days. 7/22/22 8/21/22  SAHIL Mcduffie CNP   oxyCODONE-acetaminophen (PERCOCET)  MG per tablet Take 1 tablet by mouth every 8 hours as needed for Pain for up to 30 days.  Intended supply: 30 days 7/21/22 8/20/22  SAHIL Mcduffie CNP   doxepin (SINEQUAN) 25 MG capsule take 2 capsules by mouth at bedtime 6/13/22   Rhea Troy PA-C   CPAP Machine MISC by Does not apply route Please change bipap to 18/14 cm H20. 5/31/22   Rhea Troy PA-C   carvedilol (COREG) 25 MG tablet Take 2 tablets by mouth 2 times daily 5/24/22   SAHIL Rees CNP   oxybutynin (DITROPAN-XL) 5 MG extended release tablet Take 1 tablet by mouth at bedtime 4/24/22   sIaak Rivero DO   amitriptyline (ELAVIL) 10 MG tablet Take 10 mg by mouth 2 times daily    Historical Provider, MD   tamsulosin (FLOMAX) 0.4 MG capsule Take 1 capsule by mouth nightly 4/12/22 4/12/23  Wil Veronica MD   hydrALAZINE (APRESOLINE) 25 MG tablet Take 1 tablet by mouth every 8 hours 3/30/22   Rj Sun,    levothyroxine (SYNTHROID) 175 MCG tablet Take 1 tablet by mouth Daily 3/31/22   Eloina Vargas,    amLODIPine (NORVASC) 10 MG tablet Take 1 tablet by mouth daily  Patient not taking: No sig reported 3/31/22   Eloina Vargas, DO   melatonin 3 MG TABS tablet Take 1.5 tablets by mouth nightly as needed (Sleep) 3/30/22 8/4/22  Eloina Vargas DO   ipratropium-albuterol (DUONEB) 0.5-2.5 (3) MG/3ML SOLN nebulizer solution Inhale 3 mLs into the lungs every 4 hours as needed for Shortness of Breath  Patient taking differently: Inhale 1 vial into the lungs every 4 hours as needed for Shortness of Breath Usually uses at night 3/21/22   Darus Lard, DO   Fluticasone furoate-vilanterol (BREO ELLIPTA) 200-25 MCG/INH AEPB inhaler Inhale 1 puff into the lungs daily  Patient not taking: No sig reported 2/15/22   SAHIL Kaiser CNP   montelukast (SINGULAIR) 10 MG tablet Take 1 tablet by mouth nightly 2/15/22   SAHIL Kaiser CNP   albuterol sulfate  (90 Base) MCG/ACT inhaler Inhale 2 puffs into the lungs every 6 hours as needed for Wheezing or Shortness of Breath 2/15/22   SAHIL Kaiser CNP   rOPINIRole (REQUIP) 1 MG tablet Take 1 pill in afternoon and 2 at bedtime 12/28/21   Abraham Mora PA-C   aspirin EC 81 MG EC tablet Take 1 tablet by mouth daily 9/10/21   SAHIL Horner CNP   nitroGLYCERIN (NITROSTAT) 0.4 MG SL tablet Place 1 tablet under the tongue every 5 minutes as needed for Chest pain (up to 3 doses) 6/7/21   SAHIL Horner CNP   glimepiride (AMARYL) 4 MG tablet Take 4 mg by mouth daily  1/16/21   Historical Provider, MD   azelastine (ASTELIN) 137 MCG/SPRAY nasal spray 1 spray by Nasal route as needed.  Use in each nostril as directed    Historical Provider, MD     Allergies: Shellfish-derived products, Pcn [penicillins], Succinylcholine, Sulfa antibiotics, Morphine, and Tape [adhesive tape]  IP meds : Scheduled Meds:   rOPINIRole  1 mg Oral Nightly    sodium zirconium cyclosilicate  10 g Oral TID    [START ON 8/10/2022] epoetin justin-epbx  2,000 Units SubCUTAneous Once per day on Mon Wed Fri    And    [START ON 8/10/2022] epoetin justin-epbx  3,000 Units SubCUTAneous Once per day on Mon Wed Fri    sodium chloride flush  5-40 mL IntraVENous 2 times per day    [Held by provider] enoxaparin  30 mg SubCUTAneous Q24H    [Held by provider] aspirin EC  81 mg Oral Daily    montelukast  10 mg Oral Nightly    mometasone-formoterol  2 puff Inhalation BID    rOPINIRole  0.5 mg Oral Daily    carvedilol  50 mg Oral BID WC    [Held by provider] tamsulosin  0.4 mg Oral Nightly levothyroxine  175 mcg Oral Daily    oxybutynin  5 mg Oral Nightly     Continuous Infusions:   sodium chloride 100 mL/hr at 08/09/22 1122    dextrose      sodium chloride       Review of Systems Physical Exam   Review of Systems   Constitutional:  Positive for activity change, appetite change and fatigue. Negative for chills and fever. HENT:  Negative for dental problem, hearing loss and sneezing. Eyes:  Negative for visual disturbance. Respiratory:  Positive for cough and shortness of breath. Negative for wheezing. Cardiovascular:  Negative for chest pain and palpitations. Gastrointestinal:  Positive for diarrhea and nausea. Negative for blood in stool, constipation and vomiting. Genitourinary:  Negative for difficulty urinating, dysuria and hematuria. Musculoskeletal:  Negative for neck pain and neck stiffness. Skin:  Negative for rash and wound. Neurological:  Negative for dizziness and weakness. Psychiatric/Behavioral:  Negative for behavioral problems and dysphoric mood. The patient is not nervous/anxious. Physical Exam  Vitals and nursing note reviewed. Exam conducted with a chaperone present. Constitutional:       General: He is not in acute distress. Appearance: Normal appearance. He is obese. He is not ill-appearing, toxic-appearing or diaphoretic. HENT:      Head: Normocephalic and atraumatic. Nose: Nose normal.      Mouth/Throat:      Pharynx: Oropharynx is clear. Eyes:      General: No scleral icterus. Extraocular Movements: Extraocular movements intact. Cardiovascular:      Rate and Rhythm: Regular rhythm. Bradycardia present. Pulses: Normal pulses. Heart sounds: Normal heart sounds. No murmur heard. No friction rub. No gallop. Pulmonary:      Effort: No respiratory distress. Breath sounds: Decreased air movement present. Decreased breath sounds present. No wheezing, rhonchi or rales. Comments:  On NC supplemental O2  Abdominal: General: Abdomen is flat. Bowel sounds are normal. There is no distension. Palpations: Abdomen is soft. There is no mass. Tenderness: There is no abdominal tenderness. Musculoskeletal:         General: Swelling present. No deformity or signs of injury. Normal range of motion. Cervical back: Normal range of motion. No rigidity or tenderness. Right lower leg: Edema present. Left lower leg: Edema present. Skin:     General: Skin is warm and dry. Coloration: Skin is not jaundiced or pale. Findings: No bruising or rash. Neurological:      General: No focal deficit present. Mental Status: He is alert and oriented to person, place, and time. Motor: No weakness. Coordination: Coordination normal.      Gait: Gait normal.   Psychiatric:         Mood and Affect: Mood normal.         Behavior: Behavior normal.         Thought Content:  Thought content normal.         Judgment: Judgment normal.         Vitals:    08/09/22 1100   BP: (!) 140/84   Pulse: 57   Resp: 16   Temp: 97.7 °F (36.5 °C)   SpO2: 100%     Labs, Radiology and Tests       Recent Labs     08/08/22  1909 08/09/22  0030 08/09/22  0626   WBC 6.6  --  6.2   RBC 3.34*  --  3.06*   HGB 8.6* 8.2* 7.8*   HCT 28.6* 27.5* 26.9*   MCV 85.6  --  87.9   MCH 25.7*  --  25.5*   MCHC 30.1*  --  29.0*     --  249     Recent Labs     08/08/22  1909 08/08/22  2152 08/09/22  0417 08/09/22  0626 08/09/22  0803    137  --  138  --    K 7.0* 6.8* 6.6* 6.3* 6.3*   CL 99 98  --  102  --    CO2 23 25  --  21*  --    BUN 72* 69*  --  71*  --    CREATININE 6.6* 6.6*  --  6.3*  --    CALCIUM 9.6 9.5  --  9.5  --    PROT 8.4*  --   --   --   --    LABALBU 3.7  --   --   --   --    BILITOT 0.3  --   --   --   --    ALKPHOS 91  --   --   --   --    AST 9  --   --   --   --    ALT 9*  --   --   --   --        Imaging :  US ordered today - pending    Other :   ECHO 4/21/22 - EF 55%    Creatinine baseline - 2.5-3, eGFR ~30, since March 2022     Assessment    Renal - CAROLE on CKD, eGFR down to 9% from 19% last month. Unclear etiology of renal insult, however likely cardiorenal progression and/or iatrogenic. Cr slightly improved to 6.3 from 6.6 at admission with IVF.   C/w 100 ml/hr NS  Avoid nephrotoxic agents, including diuretics  Daily BMP  Likely the cause of the hyperkalemia (though had a hx of hypokalemia previously)  Electrolytes - Hyperkalemia to 7 yesterday, improving with IVF and kayexalate, rechecking q 4  HTN, on carvedilol 50 mg BID, stable this AM at 128/74  Anemia, will give retacrit, could be chronic disease etiology or iron def, will get iron studies  Hx of obesit, hypothyroidism, DM2, CHF (EF 55%), HTN, LAURIE  Meds reviewed and discussed with patient and spouse      Cristi Smith DO, PGY-1

## 2022-08-09 NOTE — ED NOTES
Assumed care at this time. Pt ambulated to the bathroom with assistance. Pt states he had blood in his stool and states this is the first time he noticed the blood. Pt currently denying any abdominal pain or pain upon having a BM. RN notified provider. Pt now resting in bed alert and linens changed. No distress noted. Pt provided with water and denies any other needs.  Pt stable at this time     Geraldo ColletRoxbury Treatment Center  08/08/22 1746

## 2022-08-09 NOTE — PROGRESS NOTES
Hospitalist Progress Note    Patient:  Lianna Fairchild    Unit/Bed:4K-13/013-A  YOB: 1960  MRN: [id number]   Acct: [de-identified]   PCP: Lila Moreno DO  Code Status: Full Code  Date of Admission: 8/8/2022      Assessment/Plan:    CAROLE on CKD IV: Creatinine bumped from baseline of 3-3.5 to now 6.5. Nephrology following- unclear etiology. Renal ultrasound is pending. Nephrology planning to hold diuretics and continue with gentle IV fluids today. Repeat BMP in AM.    Hyperkalemia: Secondary to CAROLE and KCl supplements. Patient was given IV insulin and calcium gluconate in the ED. Nephrology following, patient started on [address]. Check potassium every 4 hours. Low potassium diet. Chronic HFpEF: EF 55% on Echo 4/2022. No overt signs of decompensation. Diuretics are being held for above. Resume home medications    Chronic normocytic anemia: No signs of acute bleeding. Hemoglobin stable with baseline. Likely anemia of chronic disease. Patient started on Retacrit per nephrology. Chronic hypoxic respiratory failure: Baseline 4 L nasal cannula. Essential hypertension: BP stable, continue home meds    COPD: Continue home inhalers    Hypothyroidism: Synthroid, check TSH    LAURIE: Home CPAP    Physical deconditioning: PT/OT    Obesity: BMI 43.4    Chronic back pain:cont home meds     Hx BPH/bladder spasms: home meds resumed        Chief Complaint: Abnormal lab    HPI / Hospital Course:   Initial HPI: \"63 y.o. male who presented to [address] [place] [address] with abnormal labs. Patient went to regular scheduled weekly labs drawn and call back from nephrology service about abnormal labs and sent to ED [address] [place] [address]. Patient complains fatigue which is worse than used to be. Denies chest pain heart palpitations dizziness lightheadedness fever chills productive cough abdominal pain dysuria.   In ED, found potassium 7.0, BUN 72, creatinine 6.6, anion gap 16, GFR 9, magnesium 2.5, hemoglobin 8.6, hematocrit 28, MCV 85, troponin 0.0 35  EKG is negative for acute ischemic changes. And admitted for further evaluation and management\"     Subjective (past 24 hours): Patient's wife states that yesterday when he was going to have his labs drawn, he was extremely weak, unable to ambulate, difficulty finding his words. This resolved prior to arrival.  Patient currently is doing well. He reports shortness of breath and fatigue which are chronic and at baseline. Patient reports lower extremity edema slightly worse than his normal.  Patient denies fever/chills, chest pain, palpitations, lightheadedness. No focal neurodeficits. Nephrology following, started on [place]. Monitoring potassium every 4 hours. ROS: Pertinent positives as noted in HPI. All other systems reviewed and negative. Past medical history, family history, social history and allergies reviewed again and is unchanged since admission. Medications:  Reviewed.   Infusion Medications    sodium chloride 100 mL/hr at 08/09/22 1122    dextrose      sodium chloride       Scheduled Medications    rOPINIRole  1 mg Oral Nightly    sodium zirconium cyclosilicate  10 g Oral TID    [START ON 8/10/2022] epoetin justin-epbx  2,000 Units SubCUTAneous Once per day on Mon Wed Fri    And    [START ON 8/10/2022] epoetin justin-epbx  3,000 Units SubCUTAneous Once per day on Mon Wed Fri    sodium chloride flush  5-40 mL IntraVENous 2 times per day    [Held by provider] enoxaparin  30 mg SubCUTAneous Q24H    [Held by provider] aspirin EC  81 mg Oral Daily    montelukast  10 mg Oral Nightly    mometasone-formoterol  2 puff Inhalation BID    rOPINIRole  0.5 mg Oral Daily    carvedilol  50 mg Oral BID WC    [Held by provider] tamsulosin  0.4 mg Oral Nightly    levothyroxine  175 mcg Oral Daily    oxybutynin  5 mg Oral Nightly     PRN Meds: albuterol sulfate HFA, glucose, dextrose bolus **OR** dextrose bolus, glucagon (rDNA), dextrose, sodium chloride flush, sodium chloride, ondansetron **OR** ondansetron, polyethylene glycol, acetaminophen **OR** acetaminophen, oxyCODONE-acetaminophen, nitroGLYCERIN, melatonin    I/O:     Intake/Output Summary (Last 24 hours) at 8/9/2022 1617  Last data filed at 8/9/2022 1318  Gross per 24 hour   Intake 530 ml   Output --   Net 530 ml       Diet:  ADULT DIET; Regular; 4 carb choices (60 gm/meal); Low Sodium (2 gm); Low Potassium (Less than 3000 mg/day); Low Phosphorus (Less than 1000 mg); 1800 ml    Exam:  /76   Pulse 63   Temp 97.6 °F (36.4 °C) (Oral)   Resp 18   Ht 6' 1\" (1.854 m)   Wt (!) 329 lb (149.2 kg)   SpO2 95%   BMI 43.41 kg/m²   General:  Chronically ill appearing male. NAD. HEENT:  normocephalic and atraumatic. No scleral icterus. PERR. Neck: supple. No JVD. No thyromegaly. Lungs: clear to auscultation. No retractions  Cardiac: RRR without murmur. Abdomen: soft. Nontender. Bowel sounds positive. Extremities:  No clubbing, cyanosis, or edema. 2+ BLE edema. Vasculature: capillary refill < 3 seconds. Palpable LE pulses bilaterally. Skin:  warm and dry. Psych:  Alert and oriented x3. Affect appropriate  Lymph:  No supraclavicular adenopathy. Neurologic:  No focal deficit. No seizures.       Data: (All radiographs, tracings, PFTs, and imaging are personally viewed and interpreted unless otherwise noted)  Labs:   Recent Labs     08/08/22 1909 08/09/22  0030 08/09/22  0626   WBC 6.6  --  6.2   HGB 8.6* 8.2* 7.8*   HCT 28.6* 27.5* 26.9*     --  249     Recent Labs     08/08/22  1909 08/08/22  2152 08/09/22  0417 08/09/22  0626 08/09/22  0803    137  --  138  --    K 7.0* 6.8* 6.6* 6.3* 6.3*   CL 99 98  --  102  --    CO2 23 25  --  21*  --    BUN 72* 69*  --  71*  --    CREATININE 6.6* 6.6*  --  6.3*  --    CALCIUM 9.6 9.5  --  9.5  --    PHOS  --   --   --  7.7*  --      Recent Labs     08/08/22  1909   AST 9   ALT 9*   BILITOT 0.3   ALKPHOS 91     No results for input(s): INR in the last 72 hours. No results for input(s): Haylie Fuchs in the last 72 hours. Urinalysis:   Lab Results   Component Value Date/Time    NITRU NEGATIVE 03/23/2022 04:45 AM    WBCUA 5-9 03/23/2022 04:45 AM    BACTERIA NONE SEEN 03/23/2022 04:45 AM    RBCUA 25-50 03/23/2022 04:45 AM    BLOODU MODERATE 03/23/2022 04:45 AM    SPECGRAV 1.015 03/23/2022 04:45 AM    GLUCOSEU 500 04/13/2021 08:46 AM     Urine culture:   Lab Results   Component Value Date/Time    LABURIN  10/13/2020 02:28 PM     No growth-preliminary Growth of Contaminants. The mixture of organisms present are not a common cause of urinary tract infections and probably represent skin marco or distal urethral marco. Micro:   Blood culture #1:   Lab Results   Component Value Date/Time    BC No growth-preliminary No growth  03/16/2022 12:02 PM     Blood culture #2:No results found for: Cadence Valles  Organism:  Lab Results   Component Value Date/Time    ORG Staphylococcus aureus 03/17/2022 01:00 AM         Lab Results   Component Value Date/Time    LABGRAM  03/17/2022 01:00 AM     Moderate segmented neutrophils observed. Rare epithelial cells observed. Many gram positive cocci in pairs and clusters. MRSA culture only:No results found for: [address] [place] [address]  Respiratory culture: No results found for: CULTRESP  Aerobic and Anaerobic :  Lab Results   Component Value Date/Time    LABAERO  03/17/2022 01:00 AM     Culture also yielded heavy growth of gram positive bacilli most consistent with Corynebacterium species. LABAERO  03/17/2022 01:00 AM     moderate growth In the treatment of gram positive infections, GENTAMICIN should be CONSIDERED a SYNERGYSTIC agent ONLY. Ciprofloxacin and Levofloxacin, regardless of in vitro sensitivity, should not be used for staphylococcal infections other than uncomplicated lower UTIs. Moxifloxacin, regardless of in vitro sensitivity, should not be used for staphylococcal infections.       Lab Results Component Value Date/Time    LABANAE  2022 01:00 AM     No anaerobes isolated- preliminary No anaerobes isolated        Radiology Reports:  US RENAL COMPLETE   Final Result   1. No hydronephrosis. 2. Bilateral renal cysts. 3. Increased postvoid bladder residual and 65 mL. **This report has been created using voice recognition software. It may contain minor errors which are inherent in voice recognition technology. **      Final report electronically signed by Dr Robert Valdez on 2022 4:06 PM        US RENAL COMPLETE    Result Date: 2022  PROCEDURE: US RENAL COMPLETE CLINICAL INFORMATION: Acute Kidney Injury COMPARISON: CT abdomen and pelvis 3/19/2022 TECHNIQUE: Grayscale and color sonographic imaging of the kidneys performed in longitudinal and transverse planes. FINDINGS: RIGHT KIDNEY - 12.9 x 6.2 x 5.9 cm Resistive Index - 0.61 Cortical Thickness - 1.3 cm LEFT KIDNEY - 13.7 x 8.1 x 7.2 cm Resistive Index - 0.61 Cortical Thickness - 1.8 cm URINARY BLADDER Pre-Void - 179.5 mL Post-Void - 65 mL No hydronephrosis. No calculus disease. No solid renal mass. A 4.2 x 4.1 x 3.8 cm cyst is seen in the midpole of the right kidney. A 1.4 cm cyst is seen in the superior pole of the right kidney. A 6.5 x 5.4 x 5.3 cm cyst is seen in the midpole of the left kidney. A 6.2 x 5.9 x 5.7 cm septated cyst is seen in the midpole of the left kidney. No bladder mass. The post void bladder residual is 65 mL. 1. No hydronephrosis. 2. Bilateral renal cysts. 3. Increased postvoid bladder residual and 65 mL. **This report has been created using voice recognition software. It may contain minor errors which are inherent in voice recognition technology. ** Final report electronically signed by Dr Robert Valdez on 2022 4:06 PM        Tele:   [x] yes             [] no      Active Hospital Problems    Diagnosis Date Noted    Hyperkalemia [E87.5] 2022     Priority: Medium    Acute renal failure superimposed on stage 5 chronic kidney disease, not on chronic dialysis ([facility] [address] [N17.9, N18.5]        Electronically signed by Jacki Chappell PA-C on 8/9/2022 at 4:17 PM

## 2022-08-10 ENCOUNTER — APPOINTMENT (OUTPATIENT)
Dept: INTERVENTIONAL RADIOLOGY/VASCULAR | Age: 62
DRG: 674 | End: 2022-08-10
Payer: MEDICARE

## 2022-08-10 PROBLEM — N17.0 ATN (ACUTE TUBULAR NECROSIS) (HCC): Status: ACTIVE | Noted: 2022-08-10

## 2022-08-10 LAB
ANION GAP SERPL CALCULATED.3IONS-SCNC: 13 MEQ/L (ref 8–16)
ANION GAP SERPL CALCULATED.3IONS-SCNC: 14 MEQ/L (ref 8–16)
BUN BLDV-MCNC: 64 MG/DL (ref 7–22)
BUN BLDV-MCNC: 66 MG/DL (ref 7–22)
CALCIUM SERPL-MCNC: 9 MG/DL (ref 8.5–10.5)
CALCIUM SERPL-MCNC: 9.5 MG/DL (ref 8.5–10.5)
CHLORIDE BLD-SCNC: 103 MEQ/L (ref 98–111)
CHLORIDE BLD-SCNC: 103 MEQ/L (ref 98–111)
CO2: 20 MEQ/L (ref 23–33)
CO2: 22 MEQ/L (ref 23–33)
CREAT SERPL-MCNC: 6.4 MG/DL (ref 0.4–1.2)
CREAT SERPL-MCNC: 6.5 MG/DL (ref 0.4–1.2)
ERYTHROCYTE [DISTWIDTH] IN BLOOD BY AUTOMATED COUNT: 17.2 % (ref 11.5–14.5)
ERYTHROCYTE [DISTWIDTH] IN BLOOD BY AUTOMATED COUNT: 54.2 FL (ref 35–45)
GFR SERPL CREATININE-BSD FRML MDRD: 9 ML/MIN/1.73M2
GFR SERPL CREATININE-BSD FRML MDRD: 9 ML/MIN/1.73M2
GLUCOSE BLD-MCNC: 105 MG/DL (ref 70–108)
GLUCOSE BLD-MCNC: 139 MG/DL (ref 70–108)
GLUCOSE BLD-MCNC: 147 MG/DL (ref 70–108)
GLUCOSE BLD-MCNC: 89 MG/DL (ref 70–108)
GLUCOSE BLD-MCNC: 91 MG/DL (ref 70–108)
GLUCOSE BLD-MCNC: 92 MG/DL (ref 70–108)
GLUCOSE BLD-MCNC: 98 MG/DL (ref 70–108)
HBV SURFACE AB TITR SER: NEGATIVE {TITER}
HCT VFR BLD CALC: 24.8 % (ref 42–52)
HEMOGLOBIN: 7.4 GM/DL (ref 14–18)
HEPATITIS B CORE IGM ANTIBODY: NEGATIVE
HEPATITIS B SURFACE ANTIGEN: NEGATIVE
IRON SATURATION: 12 % (ref 20–50)
IRON: 27 UG/DL (ref 65–195)
MCH RBC QN AUTO: 25.7 PG (ref 26–33)
MCHC RBC AUTO-ENTMCNC: 29.8 GM/DL (ref 32.2–35.5)
MCV RBC AUTO: 86.1 FL (ref 80–94)
PLATELET # BLD: 238 THOU/MM3 (ref 130–400)
PMV BLD AUTO: 8.5 FL (ref 9.4–12.4)
POTASSIUM REFLEX MAGNESIUM: 6.1 MEQ/L (ref 3.5–5.2)
POTASSIUM SERPL-SCNC: 4.6 MEQ/L (ref 3.5–5.2)
POTASSIUM SERPL-SCNC: 6.2 MEQ/L (ref 3.5–5.2)
POTASSIUM SERPL-SCNC: 6.7 MEQ/L (ref 3.5–5.2)
RBC # BLD: 2.88 MILL/MM3 (ref 4.7–6.1)
SODIUM BLD-SCNC: 137 MEQ/L (ref 135–145)
SODIUM BLD-SCNC: 138 MEQ/L (ref 135–145)
TOTAL IRON BINDING CAPACITY: 221 UG/DL (ref 171–450)
WBC # BLD: 5.2 THOU/MM3 (ref 4.8–10.8)

## 2022-08-10 PROCEDURE — 86706 HEP B SURFACE ANTIBODY: CPT

## 2022-08-10 PROCEDURE — 85027 COMPLETE CBC AUTOMATED: CPT

## 2022-08-10 PROCEDURE — 6360000002 HC RX W HCPCS: Performed by: RADIOLOGY

## 2022-08-10 PROCEDURE — 36556 INSERT NON-TUNNEL CV CATH: CPT

## 2022-08-10 PROCEDURE — 84132 ASSAY OF SERUM POTASSIUM: CPT

## 2022-08-10 PROCEDURE — 99233 SBSQ HOSP IP/OBS HIGH 50: CPT | Performed by: INTERNAL MEDICINE

## 2022-08-10 PROCEDURE — 82948 REAGENT STRIP/BLOOD GLUCOSE: CPT

## 2022-08-10 PROCEDURE — 2709999900 IR FLUORO GUIDED CVA DEVICE PLMT/REPLACE/REMOVAL

## 2022-08-10 PROCEDURE — 90935 HEMODIALYSIS ONE EVALUATION: CPT

## 2022-08-10 PROCEDURE — 6360000002 HC RX W HCPCS: Performed by: PHYSICIAN ASSISTANT

## 2022-08-10 PROCEDURE — 6360000002 HC RX W HCPCS: Performed by: INTERNAL MEDICINE

## 2022-08-10 PROCEDURE — 86705 HEP B CORE ANTIBODY IGM: CPT

## 2022-08-10 PROCEDURE — 1200000003 HC TELEMETRY R&B

## 2022-08-10 PROCEDURE — 36415 COLL VENOUS BLD VENIPUNCTURE: CPT

## 2022-08-10 PROCEDURE — 5A1D70Z PERFORMANCE OF URINARY FILTRATION, INTERMITTENT, LESS THAN 6 HOURS PER DAY: ICD-10-PCS | Performed by: INTERNAL MEDICINE

## 2022-08-10 PROCEDURE — 2580000003 HC RX 258: Performed by: NURSE PRACTITIONER

## 2022-08-10 PROCEDURE — 6360000002 HC RX W HCPCS

## 2022-08-10 PROCEDURE — 83550 IRON BINDING TEST: CPT

## 2022-08-10 PROCEDURE — 2700000000 HC OXYGEN THERAPY PER DAY

## 2022-08-10 PROCEDURE — 87340 HEPATITIS B SURFACE AG IA: CPT

## 2022-08-10 PROCEDURE — 77001 FLUOROGUIDE FOR VEIN DEVICE: CPT

## 2022-08-10 PROCEDURE — 94640 AIRWAY INHALATION TREATMENT: CPT

## 2022-08-10 PROCEDURE — 2580000003 HC RX 258: Performed by: PHYSICIAN ASSISTANT

## 2022-08-10 PROCEDURE — 6370000000 HC RX 637 (ALT 250 FOR IP): Performed by: PHYSICIAN ASSISTANT

## 2022-08-10 PROCEDURE — 94761 N-INVAS EAR/PLS OXIMETRY MLT: CPT

## 2022-08-10 PROCEDURE — 2580000003 HC RX 258

## 2022-08-10 PROCEDURE — 6370000000 HC RX 637 (ALT 250 FOR IP)

## 2022-08-10 PROCEDURE — 02HV33Z INSERTION OF INFUSION DEVICE INTO SUPERIOR VENA CAVA, PERCUTANEOUS APPROACH: ICD-10-PCS | Performed by: INTERNAL MEDICINE

## 2022-08-10 PROCEDURE — 99232 SBSQ HOSP IP/OBS MODERATE 35: CPT | Performed by: PHYSICIAN ASSISTANT

## 2022-08-10 PROCEDURE — 83540 ASSAY OF IRON: CPT

## 2022-08-10 PROCEDURE — 80048 BASIC METABOLIC PNL TOTAL CA: CPT

## 2022-08-10 RX ORDER — MAGNESIUM HYDROXIDE/ALUMINUM HYDROXICE/SIMETHICONE 120; 1200; 1200 MG/30ML; MG/30ML; MG/30ML
30 SUSPENSION ORAL ONCE
Status: COMPLETED | OUTPATIENT
Start: 2022-08-10 | End: 2022-08-10

## 2022-08-10 RX ORDER — MIDAZOLAM HYDROCHLORIDE 1 MG/ML
1 INJECTION INTRAMUSCULAR; INTRAVENOUS ONCE
Status: COMPLETED | OUTPATIENT
Start: 2022-08-10 | End: 2022-08-10

## 2022-08-10 RX ADMIN — MOMETASONE FUROATE AND FORMOTEROL FUMARATE DIHYDRATE 2 PUFF: 100; 5 AEROSOL RESPIRATORY (INHALATION) at 08:04

## 2022-08-10 RX ADMIN — ONDANSETRON 4 MG: 2 INJECTION INTRAMUSCULAR; INTRAVENOUS at 22:39

## 2022-08-10 RX ADMIN — OXYBUTYNIN CHLORIDE 5 MG: 5 TABLET, EXTENDED RELEASE ORAL at 20:58

## 2022-08-10 RX ADMIN — INSULIN HUMAN 10 UNITS: 100 INJECTION, SOLUTION PARENTERAL at 00:26

## 2022-08-10 RX ADMIN — CARVEDILOL 50 MG: 25 TABLET, FILM COATED ORAL at 08:42

## 2022-08-10 RX ADMIN — CARVEDILOL 50 MG: 25 TABLET, FILM COATED ORAL at 15:36

## 2022-08-10 RX ADMIN — EPOETIN ALFA-EPBX 3000 UNITS: 3000 INJECTION, SOLUTION INTRAVENOUS; SUBCUTANEOUS at 08:43

## 2022-08-10 RX ADMIN — ALUMINUM HYDROXIDE, MAGNESIUM HYDROXIDE, AND SIMETHICONE 30 ML: 200; 200; 20 SUSPENSION ORAL at 00:20

## 2022-08-10 RX ADMIN — DEXTROSE MONOHYDRATE 250 ML: 100 INJECTION, SOLUTION INTRAVENOUS at 00:25

## 2022-08-10 RX ADMIN — SODIUM CHLORIDE, PRESERVATIVE FREE 10 ML: 5 INJECTION INTRAVENOUS at 08:44

## 2022-08-10 RX ADMIN — ALBUTEROL SULFATE 2 PUFF: 90 AEROSOL, METERED RESPIRATORY (INHALATION) at 08:04

## 2022-08-10 RX ADMIN — EPOETIN ALFA-EPBX 2000 UNITS: 2000 INJECTION, SOLUTION INTRAVENOUS; SUBCUTANEOUS at 08:43

## 2022-08-10 RX ADMIN — Medication 4.5 MG: at 21:08

## 2022-08-10 RX ADMIN — MONTELUKAST SODIUM 10 MG: 10 TABLET ORAL at 20:58

## 2022-08-10 RX ADMIN — MOMETASONE FUROATE AND FORMOTEROL FUMARATE DIHYDRATE 2 PUFF: 100; 5 AEROSOL RESPIRATORY (INHALATION) at 18:06

## 2022-08-10 RX ADMIN — SODIUM CHLORIDE: 9 INJECTION, SOLUTION INTRAVENOUS at 10:03

## 2022-08-10 RX ADMIN — MIDAZOLAM 1 MG: 1 INJECTION INTRAMUSCULAR; INTRAVENOUS at 10:32

## 2022-08-10 RX ADMIN — OXYCODONE AND ACETAMINOPHEN 1 TABLET: 10; 325 TABLET ORAL at 15:36

## 2022-08-10 RX ADMIN — SODIUM CHLORIDE: 9 INJECTION, SOLUTION INTRAVENOUS at 20:51

## 2022-08-10 RX ADMIN — CALCIUM GLUCONATE 1000 MG: 10 INJECTION, SOLUTION INTRAVENOUS at 00:23

## 2022-08-10 RX ADMIN — HYDROMORPHONE HYDROCHLORIDE 1 MG: 1 INJECTION, SOLUTION INTRAMUSCULAR; INTRAVENOUS; SUBCUTANEOUS at 10:32

## 2022-08-10 RX ADMIN — LEVOTHYROXINE SODIUM 175 MCG: 0.03 TABLET ORAL at 05:59

## 2022-08-10 RX ADMIN — ACETAMINOPHEN 325MG 650 MG: 325 TABLET ORAL at 08:43

## 2022-08-10 RX ADMIN — DOXEPIN HYDROCHLORIDE 50 MG: 50 CAPSULE ORAL at 21:07

## 2022-08-10 RX ADMIN — ROPINIROLE HYDROCHLORIDE 1 MG: 1 TABLET, FILM COATED ORAL at 20:59

## 2022-08-10 RX ADMIN — SODIUM CHLORIDE, PRESERVATIVE FREE 10 ML: 5 INJECTION INTRAVENOUS at 21:00

## 2022-08-10 ASSESSMENT — PAIN DESCRIPTION - ORIENTATION
ORIENTATION: LOWER
ORIENTATION: LOWER
ORIENTATION: LEFT
ORIENTATION: LOWER

## 2022-08-10 ASSESSMENT — PAIN DESCRIPTION - ONSET
ONSET: ON-GOING

## 2022-08-10 ASSESSMENT — PAIN DESCRIPTION - DESCRIPTORS
DESCRIPTORS: ACHING
DESCRIPTORS: ACHING;SORE
DESCRIPTORS: ACHING
DESCRIPTORS: ACHING;SORE

## 2022-08-10 ASSESSMENT — PAIN SCALES - GENERAL
PAINLEVEL_OUTOF10: 0
PAINLEVEL_OUTOF10: 9
PAINLEVEL_OUTOF10: 9
PAINLEVEL_OUTOF10: 3
PAINLEVEL_OUTOF10: 7
PAINLEVEL_OUTOF10: 8

## 2022-08-10 ASSESSMENT — PAIN - FUNCTIONAL ASSESSMENT
PAIN_FUNCTIONAL_ASSESSMENT: PREVENTS OR INTERFERES SOME ACTIVE ACTIVITIES AND ADLS

## 2022-08-10 ASSESSMENT — PAIN DESCRIPTION - DIRECTION
RADIATING_TOWARDS: LEGS
RADIATING_TOWARDS: LEGS

## 2022-08-10 ASSESSMENT — PAIN DESCRIPTION - FREQUENCY
FREQUENCY: CONTINUOUS

## 2022-08-10 ASSESSMENT — PAIN DESCRIPTION - LOCATION
LOCATION: NECK
LOCATION: BACK

## 2022-08-10 ASSESSMENT — PAIN DESCRIPTION - PAIN TYPE
TYPE: CHRONIC PAIN

## 2022-08-10 NOTE — PROGRESS NOTES
Hospitalist Progress Note    Patient:  Andrea Rivas    Unit/Bed:4K-13/013-A  YOB: 1960  MRN: [id number]   Acct: [de-identified]   PCP: Carolina Kaur DO  Code Status: Full Code  Date of Admission: 8/8/2022      Assessment/Plan:    CAROLE on CKD IV: Creatinine bumped from baseline of 3-3.5 to now 6.5. Nephrology following- unclear etiology. Renal ultrasound is pending. Nephrology planning to hold diuretics and continue with gentle IV fluids today. Creatinine remains elevated, stable. Nephrology planning to initiate dialysis. Strict I/Os. Hyperkalemia: Secondary to CAROLE and KCl supplements. Patient was given IV insulin and calcium gluconate in the ED. Nephrology following, patient was treated with CED, monitoring potassium q4 hours. Low potassium diet. Potassium remains elevated, up to 6.7. Nephrology planning to place temporary dialysis cath today and patient to go for dialysis. Chronic HFpEF: EF 55% on Echo 4/2022. No overt signs of decompensation. Diuretics are being held for above. Resume home carvedilol. Chronic normocytic anemia: No signs of acute bleeding. Hemoglobin stable with baseline. Likely anemia of chronic disease. Patient started on Retacrit per nephrology. COPD with chronic hypoxic respiratory failure: Baseline 4 L nasal cannula. Cont home inhalers. Essential hypertension: BP stable, continue home meds    Hypothyroidism: on Synthroid    LAURIE: Home CPAP    Physical deconditioning: PT/OT    Obesity: BMI 43.4    Chronic back pain:cont home meds     Hx BPH/bladder spasms: home meds resumed        Chief Complaint: Abnormal lab    HPI / Hospital Course:   Initial HPI: \"63 y.o. male who presented to [facility] with abnormal labs. Patient went to regular scheduled weekly labs drawn and call back from nephrology service about abnormal labs and sent to ED [facility]. Patient complains fatigue which is worse than used to be. Denies chest pain heart palpitations dizziness lightheadedness fever chills productive cough abdominal pain dysuria. In ED, found potassium 7.0, BUN 72, creatinine 6.6, anion gap 16, GFR 9, magnesium 2.5, hemoglobin 8.6, hematocrit 28, MCV 85, troponin 0.0 35  EKG is negative for acute ischemic changes. And admitted for further evaluation and management\"     8/9: Patient's wife states that yesterday when he was going to have his labs drawn, he was extremely weak, unable to ambulate, difficulty finding his words. This resolved prior to arrival.  Patient currently is doing well. He reports shortness of breath and fatigue which are chronic and at baseline. Patient reports lower extremity edema slightly worse than his normal.  Patient denies fever/chills, chest pain, palpitations, lightheadedness. No focal neurodeficits. Nephrology following, started on [place]. Monitoring potassium every 4 hours. Subjective (past 24 hours): Patient is up in chair. He reports muscle twitching/spasms randomly. Patient is nervous about initiating dialysis. Patient denies fever/chills, chest pain, shortness of breath, abdominal pain, N/C/D. Patient states he is still having \"normal\" amount of urine output. ROS: Pertinent positives as noted in HPI. All other systems reviewed and negative. Past medical history, family history, social history and allergies reviewed again and is unchanged since admission. Medications:  Reviewed.   Infusion Medications    sodium chloride 100 mL/hr at 08/10/22 7110    dextrose      sodium chloride       Scheduled Medications    rOPINIRole  1 mg Oral Nightly    epoetin justin-epbx  2,000 Units SubCUTAneous Once per day on Mon Wed Fri    And    epoetin justin-epbx  3,000 Units SubCUTAneous Once per day on Mon Wed Fri    sodium chloride flush  5-40 mL IntraVENous 2 times per day    [Held by provider] enoxaparin  30 mg SubCUTAneous Q24H    [Held by provider] aspirin EC  81 mg Oral Daily montelukast  10 mg Oral Nightly    mometasone-formoterol  2 puff Inhalation BID    rOPINIRole  0.5 mg Oral Daily    carvedilol  50 mg Oral BID WC    [Held by provider] tamsulosin  0.4 mg Oral Nightly    levothyroxine  175 mcg Oral Daily    oxybutynin  5 mg Oral Nightly     PRN Meds: albuterol sulfate HFA, doxepin, glucose, dextrose bolus **OR** dextrose bolus, glucagon (rDNA), dextrose, sodium chloride flush, sodium chloride, ondansetron **OR** ondansetron, polyethylene glycol, acetaminophen **OR** acetaminophen, oxyCODONE-acetaminophen, nitroGLYCERIN, melatonin    I/O:     Intake/Output Summary (Last 24 hours) at 8/10/2022 0747  Last data filed at 8/10/2022 6390  Gross per 24 hour   Intake 3491.41 ml   Output 1450 ml   Net 2041.41 ml         Diet:  ADULT DIET; Regular; 4 carb choices (60 gm/meal); Low Sodium (2 gm); Low Potassium (Less than 3000 mg/day); Low Phosphorus (Less than 1000 mg); 1800 ml    Exam:  BP (!) 112/55   Pulse 64   Temp 98.4 °F (36.9 °C) (Oral)   Resp 18   Ht 6' 1\" (1.854 m)   Wt (!) 329 lb (149.2 kg)   SpO2 94%   BMI 43.41 kg/m²   General:  Chronically ill appearing male. NAD. HEENT:  normocephalic and atraumatic. No scleral icterus. PERR. Neck: supple. No JVD. No thyromegaly. Lungs: clear to auscultation. No retractions  Cardiac: RRR without murmur. Abdomen: soft. Nontender. Bowel sounds positive. Extremities:  No clubbing, cyanosis, or edema. 2+ BLE edema. Vasculature: capillary refill < 3 seconds. Palpable LE pulses bilaterally. Skin:  warm and dry. Psych:  Alert and oriented x3. Affect appropriate  Lymph:  No supraclavicular adenopathy. Neurologic:  No focal deficit. No seizures.       Data: (All radiographs, tracings, PFTs, and imaging are personally viewed and interpreted unless otherwise noted)  Labs:   Recent Labs     08/08/22  1909 08/09/22  0030 08/09/22  0626 08/10/22  0019   WBC 6.6  --  6.2 5.2   HGB 8.6* 8.2* 7.8* 7.4*   HCT 28.6* 27.5* 26.9* 24.8*   PLT 284  --  249 238       Recent Labs     08/08/22  2152 08/09/22  0417 08/09/22  0626 08/09/22  0803 08/09/22  2031 08/10/22  0019     --  138  --   --  137   K 6.8*   < > 6.3*   < > 6.7* 6.2*  6.1*   CL 98  --  102  --   --  103   CO2 25  --  21*  --   --  20*   BUN 69*  --  71*  --   --  64*   CREATININE 6.6*  --  6.3*  --   --  6.4*   CALCIUM 9.5  --  9.5  --   --  9.0   PHOS  --   --  7.7*  --   --   --     < > = values in this interval not displayed. Recent Labs     08/08/22  1909   AST 9   ALT 9*   BILITOT 0.3   ALKPHOS 91       No results for input(s): INR in the last 72 hours. No results for input(s): Enmanuel Pears in the last 72 hours. Urinalysis:   Lab Results   Component Value Date/Time    NITRU NEGATIVE 03/23/2022 04:45 AM    WBCUA 5-9 03/23/2022 04:45 AM    BACTERIA NONE SEEN 03/23/2022 04:45 AM    RBCUA 25-50 03/23/2022 04:45 AM    BLOODU MODERATE 03/23/2022 04:45 AM    SPECGRAV 1.015 03/23/2022 04:45 AM    GLUCOSEU 500 04/13/2021 08:46 AM     Urine culture:   Lab Results   Component Value Date/Time    LABURIN  10/13/2020 02:28 PM     No growth-preliminary Growth of Contaminants. The mixture of organisms present are not a common cause of urinary tract infections and probably represent skin marco or distal urethral marco. Micro:   Blood culture #1:   Lab Results   Component Value Date/Time    BC No growth-preliminary No growth  03/16/2022 12:02 PM     Blood culture #2:No results found for: Runell Clutter  Organism:  Lab Results   Component Value Date/Time    ORG Staphylococcus aureus 03/17/2022 01:00 AM         Lab Results   Component Value Date/Time    LABGRAM  03/17/2022 01:00 AM     Moderate segmented neutrophils observed. Rare epithelial cells observed. Many gram positive cocci in pairs and clusters.       MRSA culture only:No results found for: [facility]  Respiratory culture: No results found for: CULTRESP  Aerobic and Anaerobic :  Lab Results   Component Value Date/Time    LABAERO 03/17/2022 01:00 AM     Culture also yielded heavy growth of gram positive bacilli most consistent with Corynebacterium species. LABAERO  03/17/2022 01:00 AM     moderate growth In the treatment of gram positive infections, GENTAMICIN should be CONSIDERED a SYNERGYSTIC agent ONLY. Ciprofloxacin and Levofloxacin, regardless of in vitro sensitivity, should not be used for staphylococcal infections other than uncomplicated lower UTIs. Moxifloxacin, regardless of in vitro sensitivity, should not be used for staphylococcal infections. Lab Results   Component Value Date/Time    LABANAE  03/17/2022 01:00 AM     No anaerobes isolated- preliminary No anaerobes isolated        Radiology Reports:  US RENAL COMPLETE   Final Result   1. No hydronephrosis. 2. Bilateral renal cysts. 3. Increased postvoid bladder residual and 65 mL. **This report has been created using voice recognition software. It may contain minor errors which are inherent in voice recognition technology. **      Final report electronically signed by Dr Sheila Villa on 8/9/2022 4:06 PM        US RENAL COMPLETE    Result Date: 8/9/2022  PROCEDURE: US RENAL COMPLETE CLINICAL INFORMATION: Acute Kidney Injury COMPARISON: CT abdomen and pelvis 3/19/2022 TECHNIQUE: Grayscale and color sonographic imaging of the kidneys performed in longitudinal and transverse planes. FINDINGS: RIGHT KIDNEY - 12.9 x 6.2 x 5.9 cm Resistive Index - 0.61 Cortical Thickness - 1.3 cm LEFT KIDNEY - 13.7 x 8.1 x 7.2 cm Resistive Index - 0.61 Cortical Thickness - 1.8 cm URINARY BLADDER Pre-Void - 179.5 mL Post-Void - 65 mL No hydronephrosis. No calculus disease. No solid renal mass. A 4.2 x 4.1 x 3.8 cm cyst is seen in the midpole of the right kidney. A 1.4 cm cyst is seen in the superior pole of the right kidney. A 6.5 x 5.4 x 5.3 cm cyst is seen in the midpole of the left kidney. A 6.2 x 5.9 x 5.7 cm septated cyst is seen in the midpole of the left kidney.  No bladder mass. The post void bladder residual is 65 mL. 1. No hydronephrosis. 2. Bilateral renal cysts. 3. Increased postvoid bladder residual and 65 mL. **This report has been created using voice recognition software. It may contain minor errors which are inherent in voice recognition technology. ** Final report electronically signed by Dr Robert Valdez on 2022 4:06 PM        Tele:   [x] yes             [] no      Active Hospital Problems    Diagnosis Date Noted    Hyperkalemia [E87.5] 2022     Priority: Medium    Acute renal failure superimposed on stage 5 chronic kidney disease, not on chronic dialysis ([facility] [address] [N17.9, N18.5]        Electronically signed by Bebeto Liriano PA-C on 8/10/2022 at 7:47 AM

## 2022-08-10 NOTE — PROGRESS NOTES
1010 Patient received in IR for temporary dialysis catheter insertion. 1015 This procedure has been fully reviewed with the patient and written informed consent has been obtained. 1026 Procedure started with Dr. Caleb Rangel. 1040 Procedure completed; patient tolerated well. Dressing to right IJ; no bleeding noted. 1045 Patient on bed; comfort ensured. 1050 Patient taken to inpatient dialysis via bed.

## 2022-08-10 NOTE — FLOWSHEET NOTE
08/10/22 1431   Vital Signs   BP (!) 124/58   Temp 98.2 °F (36.8 °C)   Heart Rate 75   Resp 18   Weight (!) 333 lb 5.4 oz (151.2 kg)   Weight Method Bed scale   Percent Weight Change 1.32   Post-Hemodialysis Assessment   Post-Treatment Procedures Blood returned;Catheter Capped, clamped with Saline x2 ports   Machine Disinfection Process Acid/Vinegar Clean;Heat Disinfect; Exterior Machine Disinfection   Rinseback Volume (ml) 400 ml   Blood Volume Processed (Liters) 43 l/min   Dialyzer Clearance Lightly streaked   Duration of Treatment (minutes) 180 minutes   Heparin Amount Administered During Treatment (mL) 0 mL   Hemodialysis Intake (ml) 400 ml   Hemodialysis Output (ml) 400 ml   NET Removed (ml) 0   stable 3 hour treatment. 0 net uf. cath lines flushed with 0.9 ns, clamped and tegos in place.

## 2022-08-10 NOTE — PLAN OF CARE
Problem: Respiratory - Adult  Goal: Clear lung sounds  Outcome: Progressing  Note: Albuterol and Dulera to improve breath sounds   Patient mutually agreed on goals.

## 2022-08-10 NOTE — PROGRESS NOTES
[facility]   Nephrology progress note  8/10/2022, 4:00 PM      Pt Name:    Yolanda Silvestre  MRN:     [id number]     YOB: 1960  Admit Date:    8/8/2022  6:24 PM    Chief Complaint: Nephrology following for CAROLE/CKD. Subjective:  Patient seen and examined  No chest pain or shortness of breath  Feels okay  Potassium remains elevated despite several rounds of D50 and IV insulin  Making urine    Objective:  24HR INTAKE/OUTPUT:    Intake/Output Summary (Last 24 hours) at 8/10/2022 1600  Last data filed at 8/10/2022 1537  Gross per 24 hour   Intake 4381.41 ml   Output 2250 ml   Net 2131.41 ml         I/O last 3 completed shifts: In: 3741.4 [P.O.:580; I.V.:2960.5; IV Piggyback:200.9]  Out: 1450 [Urine:1450]  I/O this shift:  In: 1170 [P.O.:760;  I.V.:10]  Out: 800 [Urine:400]   Admission weight: (!) 329 lb (149.2 kg)  Wt Readings from Last 3 Encounters:   08/10/22 (!) 333 lb 5.4 oz (151.2 kg)   08/04/22 (!) 329 lb (149.2 kg)   07/25/22 (!) 329 lb (149.2 kg)        Vitals :   Vitals:    08/10/22 1040 08/10/22 1100 08/10/22 1431 08/10/22 1526   BP: 124/74 116/71 (!) 124/58 (!) 122/59   Pulse: 68 65 75 74   Resp: 22 20 18 18   Temp:  98.9 °F (37.2 °C) 98.2 °F (36.8 °C) 97.8 °F (36.6 °C)   TempSrc:    Oral   SpO2: 98% 92%  94%   Weight:   (!) 333 lb 5.4 oz (151.2 kg)    Height:           Physical examination  General Appearance: alert and cooperative with exam, appears comfortable, no distress  Mouth/Throat: Oral mucosa moist  Neck: No JVD  Lungs: Air entry B/L, no rales, no use of accessory muscles  Heart:  S1, S2 heard  GI: soft, non-tender, no guarding, + obese  Extremities: No pitting LE edema    Medications:  Infusion:    sodium chloride 100 mL/hr at 08/10/22 1003    dextrose      sodium chloride       Meds:    rOPINIRole  1 mg Oral Nightly    epoetin justin-epbx  2,000 Units SubCUTAneous Once per day on Mon Wed Fri    And    epoetin justin-epbx  3,000 Units SubCUTAneous Once per day on Mon Wed Fri    sodium chloride flush  5-40 mL IntraVENous 2 times per day    [Held by provider] enoxaparin  30 mg SubCUTAneous Q24H    [Held by provider] aspirin EC  81 mg Oral Daily    montelukast  10 mg Oral Nightly    mometasone-formoterol  2 puff Inhalation BID    rOPINIRole  0.5 mg Oral Daily    carvedilol  50 mg Oral BID WC    [Held by provider] tamsulosin  0.4 mg Oral Nightly    levothyroxine  175 mcg Oral Daily    oxybutynin  5 mg Oral Nightly     Meds prn: albuterol sulfate HFA, doxepin, glucose, dextrose bolus **OR** dextrose bolus, glucagon (rDNA), dextrose, sodium chloride flush, sodium chloride, ondansetron **OR** ondansetron, polyethylene glycol, acetaminophen **OR** acetaminophen, oxyCODONE-acetaminophen, nitroGLYCERIN, melatonin     Lab Data :  CBC:   Recent Labs     08/08/22 1909 08/09/22  0030 08/09/22  0626 08/10/22  0019   WBC 6.6  --  6.2 5.2   HGB 8.6* 8.2* 7.8* 7.4*   HCT 28.6* 27.5* 26.9* 24.8*     --  249 238     CMP:  Recent Labs     08/08/22 1909 08/08/22  2152 08/09/22  0626 08/09/22  0803 08/10/22  0019 08/10/22  0705      < > 138  --  137 138   K 7.0*   < > 6.3*   < > 6.2*  6.1* 6.7*   CL 99   < > 102  --  103 103   CO2 23   < > 21*  --  20* 22*   BUN 72*   < > 71*  --  64* 66*   CREATININE 6.6*   < > 6.3*  --  6.4* 6.5*   GLUCOSE 65*   < > 78  --  105 89   CALCIUM 9.6   < > 9.5  --  9.0 9.5   MG 2.5*  --  2.5*  --   --   --    PHOS  --   --  7.7*  --   --   --     < > = values in this interval not displayed. Hepatic:   Recent Labs     08/08/22 1909   LABALBU 3.7   AST 9   ALT 9*   BILITOT 0.3   ALKPHOS 91         Assessment and Plan:  1. 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 Renal function continues to be poor despite supportive management. Patient remains hyperkalemic. Discussed with patient. Has failed medical management. Patient will need diuretic clearance of hyperkalemia. Indications, risks benefits and alternatives were all discussed. Patient agreed to proceed forward with dialysis and dialysis catheter placement. Requested IR to place temporary dialysis catheter. Dialysis orders have been placed. Monitor urine output. We will recheck potassium  2. Hyperkalemia, refractory. We will plan hemodialysis treatment. Check potassium postdialysis. 3.  Diabetic nephropathy with proteinuria  4. Hypertension  5. Anemia in CKD. Resume ISAAC  6. Obesity  7. Sleep apnea  8. Hyperphosphatemia. We will add Lisa    D/W patient and RN    Lyudmila Eubanks MD  [facility]    This report has been created using voice recognition software.  It may contain minor errors which are inherent in voice recognition technology

## 2022-08-10 NOTE — FLOWSHEET NOTE
Pt is a 61y. o. male, sitting in easychair watching television, in 4K-13.  was answering a Consult entered with no reasoning given. María Ochoa was pleasant, calm but also concerned as well. He shared that his kidneys are shutting down and his CHF is an issue. He shared that he is at peace with eventuality of his passing and knows the good Allison Mujairo will take him when it's time, but is more concerned with his wife-who doesn't handle stress very well-and his grandchildren, that they will be 'taken care of'. He shared that he's most worried about those two things and not about himself-he's ready to go when it's time.  provided active listening, conversation, words of encouragement and prayer as requested, as well as for him. María Ochoa expressed gratitude for the visit and prayer. 08/09/22 2151   Encounter Summary   Encounter Overview/Reason  Spiritual/Emotional Needs   Service Provided For: Patient   Referral/Consult From: Multi-disciplinary team   Support System Spouse   Last Encounter  08/09/22   Complexity of Encounter Moderate   Begin Time 1945   End Time  2003   Total Time Calculated 18 min   Spiritual/Emotional needs   Type Difficult news received   Assessment/Intervention/Outcome   Assessment Calm; Compromised coping;Peaceful; Interrupted family processes   Intervention Active listening;Discussed belief system/[employer]ondenominational practices/raji; Explored/Affirmed feelings, thoughts, concerns;Prayer (assurance of)/[place]; Discussed illness injury and its impact   Outcome Engaged in conversation;Receptive; Expressed Gratitude;Expressed feelings, needs, and concerns;Coping

## 2022-08-10 NOTE — H&P
Formulation and discussion of sedation / procedure plans, risks, benefits, side effects and alternatives with patient and/or responsible adult completed.     Electronically signed by Tita Arrieta MD on 8/10/22 at 10:29 AM EDT

## 2022-08-10 NOTE — H&P
[facility]  Sedation/Analgesia History & Physical    Pt Name: Alfonso Abraham  MRN: [id number]  YOB: 1960  Provider Performing Procedure: Kathleen Alan MD, MD  Primary Care Physician: Kaylah Torres DO    PRE-PROCEDURE   DNR-CCA/DNR-CC []Yes [x]No  Brief History/Pre-Procedure Diagnosis: Renal failure           MEDICAL HISTORY  []CAD/Valve  []Liver Disease  []Lung Disease []Diabetes  []Hypertension []Renal Disease  []Additional information:       has a past medical history of Arthritis, Back problem, Bladder disease, CHF with unknown LVEF ([facility] [address] Chronic kidney disease, Constipation, Diabetes mellitus ([facility] [address] Hypertension, Hypertensive emergency, Hypertensive urgency, Sleep apnea, and Thyroid disease. SURGICAL HISTORY   has a past surgical history that includes Appendectomy; knee surgery (Right, 1980's); Carpal tunnel release (Bilateral); Colonoscopy (2017); hernia repair; pr exploratory of abdomen (N/A, 2/5/2018); Dilatation, esophagus; Abdomen surgery; Cardiac catheterization; and Abdomen surgery (N/A, 3/25/2022).   Additional information:       ALLERGIES   Allergies as of 08/08/2022 - Fully Reviewed 08/08/2022   Allergen Reaction Noted    Shellfish-derived products Swelling 04/23/2013    Pcn [penicillins] Itching 04/23/2013    Succinylcholine  02/05/2018    Sulfa antibiotics Itching 08/31/2021    Morphine Nausea And Vomiting 01/29/2018    Tape [adhesive tape] Rash 01/29/2018     Additional information:       MEDICATIONS   Coumadin Use Last 5 Days [x]No []Yes  Antiplatelet drug therapy use last 5 days  [x]No []Yes  Other anticoagulant use last 5 days  [x]No []Yes    Current Facility-Administered Medications:     albuterol sulfate HFA (PROVENTIL;VENTOLIN;PROAIR) 108 (90 Base) MCG/ACT inhaler 2 puff, 2 puff, Inhalation, Q4H PRN, Suma Escoto DO, 2 puff at 08/10/22 0804    rOPINIRole (REQUIP) tablet 1 mg, 1 mg, Oral, Nightly, Suma Escoto DO, 1 mg at 08/09/22 7253 epoetin justin-epbx (RETACRIT) injection 2,000 Units, 2,000 Units, SubCUTAneous, Once per day on Mon Wed Fri, 2,000 Units at 08/10/22 0843 **AND** epoetin justin-epbx (RETACRIT) injection 3,000 Units, 3,000 Units, SubCUTAneous, Once per day on Mon Wed Fri, Melody [place], DO, 3,000 Units at 08/10/22 0843    doxepin (SINEQUAN) capsule 50 mg, 50 mg, Oral, Nightly PRN, [employer], PA-C, 50 mg at 08/09/22 2247    0.9 % sodium chloride infusion, , IntraVENous, Continuous, Noel Dessert, APRN - CNP, Last Rate: 100 mL/hr at 08/10/22 1003, New Bag at 08/10/22 1003    glucose chewable tablet 16 g, 4 tablet, Oral, PRN, Noel Dessert, APRN - CNP    dextrose bolus 10% 125 mL, 125 mL, IntraVENous, PRN, Stopped at 08/09/22 0613 **OR** dextrose bolus 10% 250 mL, 250 mL, IntraVENous, PRN, Noel Dessert, APRN - CNP    glucagon (rDNA) injection 1 mg, 1 mg, SubCUTAneous, PRN, Noel Dessert, APRN - CNP    dextrose 10 % infusion, , IntraVENous, Continuous PRN, Noel Dessert, APRN - CNP    sodium chloride flush 0.9 % injection 5-40 mL, 5-40 mL, IntraVENous, 2 times per day, Suma Watersmatov, DO, 10 mL at 08/10/22 0844    sodium chloride flush 0.9 % injection 5-40 mL, 5-40 mL, IntraVENous, PRN, Hoskavon J Begmatov, DO    0.9 % sodium chloride infusion, , IntraVENous, PRN, Hoskavon FOLEY Begmatov, DO    [Held by provider] enoxaparin Sodium (LOVENOX) injection 30 mg, 30 mg, SubCUTAneous, Q24H, Hoskavon Watersmatov, DO    ondansetron (ZOFRAN-ODT) disintegrating tablet 4 mg, 4 mg, Oral, Q8H PRN **OR** ondansetron (ZOFRAN) injection 4 mg, 4 mg, IntraVENous, Q6H PRN, Suma Escoto DO, 4 mg at 08/09/22 2240    polyethylene glycol (GLYCOLAX) packet 17 g, 17 g, Oral, Daily PRN, Suma Escoto DO    acetaminophen (TYLENOL) tablet 650 mg, 650 mg, Oral, Q6H PRN, 650 mg at 08/10/22 0846 **OR** acetaminophen (TYLENOL) suppository 650 mg, 650 mg, Rectal, Q6H PRN, Suma Escoto DO    oxyCODONE-acetaminophen (PERCOCET)  MG per tablet 1 tablet, 1 tablet, Oral, Q8H PRN, Jovonmmilly FOLEY Begmatov, DO, 1 tablet at 08/09/22 2247    [Held by provider] aspirin EC tablet 81 mg, 81 mg, Oral, Daily, Hoshimjonoreen J Begmatov, DO    nitroGLYCERIN (NITROSTAT) SL tablet 0.4 mg, 0.4 mg, SubLINGual, Q5 Min PRN, Hoshimmilly J Begmatov, DO    montelukast (SINGULAIR) tablet 10 mg, 10 mg, Oral, Nightly, Hoshimmilly J Begmatov, DO, 10 mg at 08/09/22 2250    mometasone-formoterol (DULERA) 100-5 MCG/ACT inhaler 2 puff, 2 puff, Inhalation, BID, Hoskavon J Begmatov, DO, 2 puff at 08/10/22 0804    rOPINIRole (REQUIP) tablet 0.5 mg, 0.5 mg, Oral, Daily, Hoshimmilly J Begmatov, DO, 0.5 mg at 08/09/22 1302    carvedilol (COREG) tablet 50 mg, 50 mg, Oral, BID WC, Hoshimmilyl J Begmatov, DO, 50 mg at 08/10/22 0842    melatonin tablet 4.5 mg, 4.5 mg, Oral, Nightly PRN, Hoshimjonoreen J Begmatov, DO, 4.5 mg at 08/09/22 2247    [Held by provider] tamsulosin (FLOMAX) capsule 0.4 mg, 0.4 mg, Oral, Nightly, Hoshimjonoreen J Begmatov, DO    levothyroxine (SYNTHROID) tablet 175 mcg, 175 mcg, Oral, Daily, Hoshimmilly J Begmatov, DO, 175 mcg at 08/10/22 0559    oxybutynin (DITROPAN-XL) extended release tablet 5 mg, 5 mg, Oral, Nightly, Hoshimjon J Begmatov, DO, 5 mg at 08/09/22 2250  Prior to Admission medications    Medication Sig Start Date End Date Taking? Authorizing Provider   naloxone 4 MG/0.1ML LIQD nasal spray 1 spray by Nasal route as needed for Opioid Reversal 7/27/22   SAHIL Guerrero - CNP   rOPINIRole (REQUIP) 0.5 MG tablet take 1 tablet by mouth IN THE AFTERNOON AND 2 TABLETS AT NIGHT. 7/25/22   Akira Ward PA-C   potassium chloride (K-TAB) 10 MEQ extended release tablet Take 2 tablets by mouth in the morning. 7/22/22   Rodolfo Hernadez MD   bumetanide (BUMEX) 1 MG tablet Take 1 tablet by mouth in the morning and 1 tablet before bedtime. 7/22/22   Rodolfo Hernadez MD   gabapentin (NEURONTIN) 300 MG capsule Take 1 capsule by mouth nightly for 30 days.  7/22/22 8/21/22  Marvin Nelsonst, APRN - CNP   oxyCODONE-acetaminophen (PERCOCET)  MG per tablet Take 1 tablet by mouth every 8 hours as needed for Pain for up to 30 days.  Intended supply: 30 days 7/21/22 8/20/22  SAHIL Tubbs CNP   doxepin (SINEQUAN) 25 MG capsule take 2 capsules by mouth at bedtime 6/13/22   Edwin Torres PA-C   CPAP Machine MISC by Does not apply route Please change bipap to 18/14 cm H20. 5/31/22   Edwin Torres PA-C   carvedilol (COREG) 25 MG tablet Take 2 tablets by mouth 2 times daily 5/24/22   Estanislao Spurling, APRN - CNP   oxybutynin (DITROPAN-XL) 5 MG extended release tablet Take 1 tablet by mouth at bedtime 4/24/22   Anthony Alonso DO   amitriptyline (ELAVIL) 10 MG tablet Take 10 mg by mouth 2 times daily    Historical Provider, MD   tamsulosin (FLOMAX) 0.4 MG capsule Take 1 capsule by mouth nightly 4/12/22 4/12/23  Stephen Madden MD   hydrALAZINE (APRESOLINE) 25 MG tablet Take 1 tablet by mouth every 8 hours 3/30/22   Valdez Fair,    levothyroxine (SYNTHROID) 175 MCG tablet Take 1 tablet by mouth Daily 3/31/22   Deirdre Vargas DO   amLODIPine (NORVASC) 10 MG tablet Take 1 tablet by mouth daily  Patient not taking: No sig reported 3/31/22   Deirdre Vargas DO   melatonin 3 MG TABS tablet Take 1.5 tablets by mouth nightly as needed (Sleep) 3/30/22 8/4/22  Deirdre Vargas DO   ipratropium-albuterol (DUONEB) 0.5-2.5 (3) MG/3ML SOLN nebulizer solution Inhale 3 mLs into the lungs every 4 hours as needed for Shortness of Breath  Patient taking differently: Inhale 1 vial into the lungs every 4 hours as needed for Shortness of Breath Usually uses at night 3/21/22   Delmon Masker, DO   Fluticasone furoate-vilanterol (BREO ELLIPTA) 200-25 MCG/INH AEPB inhaler Inhale 1 puff into the lungs daily  Patient not taking: No sig reported 2/15/22   SAHIL Bobo CNP   montelukast (SINGULAIR) 10 MG tablet Take 1 tablet by mouth nightly 2/15/22   SAHIL Bobo CNP   albuterol sulfate HFA 108 (90 Base) MCG/ACT inhaler Inhale 2 puffs into the lungs every 6 hours as needed for Wheezing or Shortness of Breath 2/15/22   SAHIL Mcclendon CNP   rOPINIRole (REQUIP) 1 MG tablet Take 1 pill in afternoon and 2 at bedtime 12/28/21   Chico Arana PA-C   aspirin EC 81 MG EC tablet Take 1 tablet by mouth daily 9/10/21   SAHIL Garces CNP   nitroGLYCERIN (NITROSTAT) 0.4 MG SL tablet Place 1 tablet under the tongue every 5 minutes as needed for Chest pain (up to 3 doses) 6/7/21   SAHIL Garces CNP   glimepiride (AMARYL) 4 MG tablet Take 4 mg by mouth daily  1/16/21   Historical Provider, MD   azelastine (ASTELIN) 137 MCG/SPRAY nasal spray 1 spray by Nasal route as needed. Use in each nostril as directed    Historical Provider, MD     Additional information:       VITAL SIGNS   Vitals:    08/10/22 0836   BP: (!) 134/59   Pulse: 67   Resp: 18   Temp: 97.4 °F (36.3 °C)   SpO2: 93%       PHYSICAL:   Heart:  [x]Regular rate and rhythm  []Other:    Lungs:  [x]Clear    []Other:    Abdomen: [x]Soft    []Other:    Mental Status: [x]Alert & Oriented  []Other:      PLANNED PROCEDURE   []Biospy []Arteriogram              []Drainage   []Mediport Insertion  []Fistulogram []IV access       []Vertebroplasty / Augmentation  []IVC filter [x]Dialysis catheter []Biliary drainage  []Other: []CAPD Catheter []Nephrostomy Tube / Stent  SEDATION  Planned agent:[x]Midazolam []Meperidine []Sublimaze [x]Dilaudid []Morphine     []Diazepam  []Other:     ASA Classification:  []1 [x]2 []3 []4 []5  Class 1: A normal healthy patient  Class 2: Pt with mild to moderate systemic disease  Class 3: Severe systemic disease or disturbance  Class 4: Severe systemic disorders that are already life threatening. Class 5: Moribund pt with little chances of survival, for more than 24 hours.   Mallampati I Airway Classification:   []1 [x]2 []3 []4    [x]Pre-procedure diagnostic studies complete and results available. Comment:    [x]Previous sedation/anesthesia experiences assessed. Comment:    [x]The patient is an appropriate candidate to undergo the planned procedure sedation and anesthesia. (Refer to nursing sedation/analgesia documentation record)  [x]Formulation and discussion of sedation/procedure plan, risks, and expectations with patient and/or responsible adult completed. [x]Patient examined immediately prior to the procedure.  (Refer to nursing sedation/analgesia documentation record)    Pat Liu MD, MD  Electronically signed 8/10/2022 at 10:29 AM

## 2022-08-10 NOTE — PLAN OF CARE
Problem: Discharge Planning  Goal: Discharge to home or other facility with appropriate resources  Outcome: Progressing  Flowsheets (Taken 8/10/2022 0900)  Discharge to home or other facility with appropriate resources:   Identify barriers to discharge with patient and caregiver   Arrange for needed discharge resources and transportation as appropriate   Identify discharge learning needs (meds, wound care, etc)   Refer to discharge planning if patient needs post-hospital services based on physician order or complex needs related to functional status, cognitive ability or social support system     Problem: Pain  Goal: Verbalizes/displays adequate comfort level or baseline comfort level  Outcome: Progressing  Flowsheets (Taken 8/10/2022 1018)  Verbalizes/displays adequate comfort level or baseline comfort level:   Encourage patient to monitor pain and request assistance   Assess pain using appropriate pain scale   Administer analgesics based on type and severity of pain and evaluate response     Problem: Chronic Conditions and Co-morbidities  Goal: Patient's chronic conditions and co-morbidity symptoms are monitored and maintained or improved  Outcome: Progressing  Flowsheets (Taken 8/10/2022 0900)  Care Plan - Patient's Chronic Conditions and Co-Morbidity Symptoms are Monitored and Maintained or Improved: Monitor and assess patient's chronic conditions and comorbid symptoms for stability, deterioration, or improvement     Problem: Safety - Adult  Goal: Free from fall injury  Outcome: Progressing  Flowsheets (Taken 8/9/2022 1352)  Free From Fall Injury: Instruct family/caregiver on patient safety  Note: Pt remains free from falls. Bed alarm on, call light and personal items within reach.        Problem: ABCDS Injury Assessment  Goal: Absence of physical injury  Outcome: Progressing  Flowsheets (Taken 8/9/2022 1352)  Absence of Physical Injury: Implement safety measures based on patient assessment     Problem: Skin/Tissue Integrity  Goal: Absence of new skin breakdown  Description: 1. Monitor for areas of redness and/or skin breakdown  2. Assess vascular access sites hourly  3. Every 4-6 hours minimum:  Change oxygen saturation probe site  4. Every 4-6 hours:  If on nasal continuous positive airway pressure, respiratory therapy assess nares and determine need for appliance change or resting period. Outcome: Progressing  Note: Pt shows no signs of new skin breakdown. Q2 turns in place as patient allows. Education provided on the importance of repositioning. Care plan reviewed with patient. Patient verbalize understanding of the plan of care and contribute to goal setting.

## 2022-08-10 NOTE — PLAN OF CARE
Problem: Respiratory - Adult  Goal: Clear lung sounds  8/10/2022 1809 by Hyacinth Willson RCP  Outcome: Progressing  Note: Dulera to improve breath sounds.

## 2022-08-10 NOTE — OP NOTE
Department of Radiology  Post Procedure Progress Note      Pre-Procedure Diagnosis:  Renal Failure    Procedure Performed: Dialysis Catheter insertion     Anesthesia: local , versed and dilaudid    Findings: successful    Immediate Complications:  None    Estimated Blood Loss: minimal    SEE DICTATED PROCEDURE NOTE FOR COMPLETE DETAILS.       Deni Ovalles MD   8/10/2022 10:42 AM

## 2022-08-10 NOTE — CARE COORDINATION
Collaborative Discharge Planning    Lisa Marie  :  1960  MRN:  [id number]    ADMIT DATE:  2022      Discharge Planning Discharge Planning  Type of Residence: House  Living Arrangements: Spouse/Significant Other, Children  Support Systems: Spouse/Significant Other  Current Services Prior To Admission: C-pap, Oxygen Therapy  Potential Assistance Needed: Durable Medical Equipment  DME: Cane, Oxygen Therapy (Comment)  DME Ordered?: Oxygen therapy (comment)  Potential Assistance Purchasing Medications: No  Type of Home Care Services: None  Patient expects to be discharged to[de-identified] House  History of falls?: Yes  White Board Notes /Social Work Whiteboard Notes  /Social Work Whiteboard:  CM; plans home w spouse Hildred Speaker, has [facility] home oxygen 2-6L, BIPAP oxygen 1L bleed-in; current CHF Clinic    Discharge Plan Home with family  plans home w spouse Hildred Speaker; has nebulizer, [facility] home oxygen 2-6L, BIPAP 1L bleed in at night; current Dr. Donald Shaffer; therapy following; needs meet/greet, readmission interview    Discharge Milestones and Delays: Clinical status    CAROLE, Hyperkalemia/Fall x2  History: CHF  8/10 Non-Tunneled HD Catheter (intermittent HD)    K+ 6.7, Creatinine 6.5 (baseline 3-3.5), H 7.4  IVF, Oxygen 2L    SIGNED:  Edwin Hebert RN   8/10/2022, 11:17 AM

## 2022-08-11 LAB
ANION GAP SERPL CALCULATED.3IONS-SCNC: 12 MEQ/L (ref 8–16)
BUN BLDV-MCNC: 43 MG/DL (ref 7–22)
CALCIUM SERPL-MCNC: 9 MG/DL (ref 8.5–10.5)
CHLORIDE BLD-SCNC: 101 MEQ/L (ref 98–111)
CO2: 23 MEQ/L (ref 23–33)
CREAT SERPL-MCNC: 4.8 MG/DL (ref 0.4–1.2)
ERYTHROCYTE [DISTWIDTH] IN BLOOD BY AUTOMATED COUNT: 17.1 % (ref 11.5–14.5)
ERYTHROCYTE [DISTWIDTH] IN BLOOD BY AUTOMATED COUNT: 54.4 FL (ref 35–45)
FERRITIN: 225 NG/ML (ref 22–322)
GFR SERPL CREATININE-BSD FRML MDRD: 12 ML/MIN/1.73M2
GLUCOSE BLD-MCNC: 108 MG/DL (ref 70–108)
GLUCOSE BLD-MCNC: 133 MG/DL (ref 70–108)
GLUCOSE BLD-MCNC: 66 MG/DL (ref 70–108)
GLUCOSE BLD-MCNC: 92 MG/DL (ref 70–108)
GLUCOSE BLD-MCNC: 97 MG/DL (ref 70–108)
HCT VFR BLD CALC: 24.5 % (ref 42–52)
HEMOGLOBIN: 7.1 GM/DL (ref 14–18)
MCH RBC QN AUTO: 25.2 PG (ref 26–33)
MCHC RBC AUTO-ENTMCNC: 29 GM/DL (ref 32.2–35.5)
MCV RBC AUTO: 86.9 FL (ref 80–94)
PLATELET # BLD: 191 THOU/MM3 (ref 130–400)
PMV BLD AUTO: 8.6 FL (ref 9.4–12.4)
POTASSIUM REFLEX MAGNESIUM: 5.3 MEQ/L (ref 3.5–5.2)
RBC # BLD: 2.82 MILL/MM3 (ref 4.7–6.1)
SODIUM BLD-SCNC: 136 MEQ/L (ref 135–145)
WBC # BLD: 5.2 THOU/MM3 (ref 4.8–10.8)

## 2022-08-11 PROCEDURE — 97166 OT EVAL MOD COMPLEX 45 MIN: CPT

## 2022-08-11 PROCEDURE — 2580000003 HC RX 258: Performed by: NURSE PRACTITIONER

## 2022-08-11 PROCEDURE — 80048 BASIC METABOLIC PNL TOTAL CA: CPT

## 2022-08-11 PROCEDURE — 82728 ASSAY OF FERRITIN: CPT

## 2022-08-11 PROCEDURE — 82948 REAGENT STRIP/BLOOD GLUCOSE: CPT

## 2022-08-11 PROCEDURE — 97110 THERAPEUTIC EXERCISES: CPT

## 2022-08-11 PROCEDURE — 1200000003 HC TELEMETRY R&B

## 2022-08-11 PROCEDURE — 6370000000 HC RX 637 (ALT 250 FOR IP): Performed by: PHYSICIAN ASSISTANT

## 2022-08-11 PROCEDURE — 36415 COLL VENOUS BLD VENIPUNCTURE: CPT

## 2022-08-11 PROCEDURE — 85027 COMPLETE CBC AUTOMATED: CPT

## 2022-08-11 PROCEDURE — 90935 HEMODIALYSIS ONE EVALUATION: CPT

## 2022-08-11 PROCEDURE — 2580000003 HC RX 258

## 2022-08-11 PROCEDURE — 99233 SBSQ HOSP IP/OBS HIGH 50: CPT | Performed by: INTERNAL MEDICINE

## 2022-08-11 PROCEDURE — 6370000000 HC RX 637 (ALT 250 FOR IP)

## 2022-08-11 PROCEDURE — 2580000003 HC RX 258: Performed by: INTERNAL MEDICINE

## 2022-08-11 PROCEDURE — 94640 AIRWAY INHALATION TREATMENT: CPT

## 2022-08-11 PROCEDURE — 97535 SELF CARE MNGMENT TRAINING: CPT

## 2022-08-11 PROCEDURE — 2700000000 HC OXYGEN THERAPY PER DAY

## 2022-08-11 PROCEDURE — 6360000002 HC RX W HCPCS: Performed by: INTERNAL MEDICINE

## 2022-08-11 PROCEDURE — 94761 N-INVAS EAR/PLS OXIMETRY MLT: CPT

## 2022-08-11 RX ADMIN — MOMETASONE FUROATE AND FORMOTEROL FUMARATE DIHYDRATE 2 PUFF: 100; 5 AEROSOL RESPIRATORY (INHALATION) at 17:39

## 2022-08-11 RX ADMIN — ONDANSETRON 4 MG: 4 TABLET, ORALLY DISINTEGRATING ORAL at 13:47

## 2022-08-11 RX ADMIN — CARVEDILOL 50 MG: 25 TABLET, FILM COATED ORAL at 18:16

## 2022-08-11 RX ADMIN — ROPINIROLE HYDROCHLORIDE 1 MG: 1 TABLET, FILM COATED ORAL at 21:20

## 2022-08-11 RX ADMIN — CARVEDILOL 50 MG: 25 TABLET, FILM COATED ORAL at 11:51

## 2022-08-11 RX ADMIN — LEVOTHYROXINE SODIUM 175 MCG: 0.03 TABLET ORAL at 11:47

## 2022-08-11 RX ADMIN — SODIUM CHLORIDE: 9 INJECTION, SOLUTION INTRAVENOUS at 07:57

## 2022-08-11 RX ADMIN — MONTELUKAST SODIUM 10 MG: 10 TABLET ORAL at 21:20

## 2022-08-11 RX ADMIN — SODIUM CHLORIDE, PRESERVATIVE FREE 10 ML: 5 INJECTION INTRAVENOUS at 11:52

## 2022-08-11 RX ADMIN — Medication 4.5 MG: at 21:21

## 2022-08-11 RX ADMIN — OXYBUTYNIN CHLORIDE 5 MG: 5 TABLET, EXTENDED RELEASE ORAL at 21:21

## 2022-08-11 RX ADMIN — OXYCODONE AND ACETAMINOPHEN 1 TABLET: 10; 325 TABLET ORAL at 18:20

## 2022-08-11 RX ADMIN — ROPINIROLE HYDROCHLORIDE 0.5 MG: 1 TABLET, FILM COATED ORAL at 13:45

## 2022-08-11 RX ADMIN — SODIUM CHLORIDE, PRESERVATIVE FREE 10 ML: 5 INJECTION INTRAVENOUS at 21:20

## 2022-08-11 RX ADMIN — DOXEPIN HYDROCHLORIDE 50 MG: 50 CAPSULE ORAL at 21:22

## 2022-08-11 RX ADMIN — IRON SUCROSE 200 MG: 20 INJECTION, SOLUTION INTRAVENOUS at 14:43

## 2022-08-11 ASSESSMENT — PAIN SCALES - GENERAL
PAINLEVEL_OUTOF10: 6
PAINLEVEL_OUTOF10: 9

## 2022-08-11 ASSESSMENT — PAIN DESCRIPTION - LOCATION: LOCATION: BACK

## 2022-08-11 ASSESSMENT — PAIN DESCRIPTION - PAIN TYPE: TYPE: CHRONIC PAIN

## 2022-08-11 ASSESSMENT — PAIN DESCRIPTION - FREQUENCY: FREQUENCY: CONTINUOUS

## 2022-08-11 ASSESSMENT — PAIN DESCRIPTION - DESCRIPTORS: DESCRIPTORS: ACHING

## 2022-08-11 ASSESSMENT — PAIN DESCRIPTION - ONSET: ONSET: ON-GOING

## 2022-08-11 NOTE — PLAN OF CARE
Continue treatments as ordered per protocol to help facilitate normal breath sounds. Continue to wean O2 per protocol as tolerated to  keep Spo2 above 92%  .  Pt agreeable to plan

## 2022-08-11 NOTE — PROGRESS NOTES
Hospitalist Progress Note    Patient:  Maria Esther Kothari    Unit/Bed:4K-13/013-A  YOB: 1960  MRN: [id number]   Acct: [de-identified]   PCP: Faustina Olivia DO  Code Status: Full Code  Date of Admission: 8/8/2022      Assessment/Plan:    CAROLE on CKD IV: Creatinine bumped from baseline of 3-3.5 to now 6.5. Nephrology following- unclear etiology. Renal ultrasound is pending. Nephrology planning to hold diuretics and continue with gentle IV fluids today. Creatinine remains elevated, stable. Dialysis initiated 8/10. Strict I/Os. Hyperkalemia: Secondary to CAROLE and KCl supplements. Patient was given IV insulin and calcium gluconate in the ED. Nephrology following, patient was [facility], monitoring potassium q4 hours. Low potassium diet. Potassium remains elevated, up to 6.7. Temporary dialysis cath placed, patient started on dialysis 8/10. Dialysis again today 8/11    Chronic HFpEF: EF 55% on Echo 4/2022. No overt signs of decompensation. Diuretics are being held for above. Resume home carvedilol. Chronic normocytic anemia: No signs of acute bleeding. Hemoglobin stable with baseline. Likely anemia of chronic disease. Patient started on Retacrit per nephrology. Hemoglobin stable. COPD with chronic hypoxic respiratory failure: Baseline 4 L nasal cannula. Cont home inhalers. Essential hypertension: BP stable, continue home meds    Hypothyroidism: on Synthroid    LAURIE: Home CPAP    Physical deconditioning: PT/OT    Obesity: BMI 43.4    Chronic back pain:cont home meds     Hx BPH/bladder spasms: home meds resumed        Chief Complaint: Abnormal lab    HPI / Hospital Course:   Initial HPI: \"63 y.o. male who presented to [facility] with abnormal labs. Patient went to regular scheduled weekly labs drawn and call back from nephrology service about abnormal labs and sent to ED [facility].   Patient complains fatigue which is worse than used to be.  Denies chest pain heart palpitations dizziness lightheadedness fever chills productive cough abdominal pain dysuria. In ED, found potassium 7.0, BUN 72, creatinine 6.6, anion gap 16, GFR 9, magnesium 2.5, hemoglobin 8.6, hematocrit 28, MCV 85, troponin 0.0 35  EKG is negative for acute ischemic changes. And admitted for further evaluation and management\"     8/9: Patient's wife states that yesterday when he was going to have his labs drawn, he was extremely weak, unable to ambulate, difficulty finding his words. This resolved prior to arrival.  Patient currently is doing well. He reports shortness of breath and fatigue which are chronic and at baseline. Patient reports lower extremity edema slightly worse than his normal.  Patient denies fever/chills, chest pain, palpitations, lightheadedness. No focal neurodeficits. Nephrology following, started on [facility]. Monitoring potassium every 4 hours. 8/10: Patient is up in chair. He reports muscle twitching/spasms randomly. Patient is nervous about initiating dialysis. Patient denies fever/chills, chest pain, shortness of breath, abdominal pain, N/C/D. Patient states he is still having \"normal\" amount of urine output. Subjective (past 24 hours): Patient seen following dialysis. He reports feeling extremely fatigued and weak after dialysis. He denies shortness of breath, chest pain, fever/chills, abdominal pain, N/V/D. Patient having adequate urine output. Potassium improving. ROS: Pertinent positives as noted in HPI. All other systems reviewed and negative. Past medical history, family history, social history and allergies reviewed again and is unchanged since admission. Medications:  Reviewed.   Infusion Medications    sodium chloride 100 mL/hr at 08/11/22 0400    dextrose      sodium chloride       Scheduled Medications    rOPINIRole  1 mg Oral Nightly    epoetin justin-epbx  2,000 Units SubCUTAneous Once per day on Mon Wed Fri And    epoetin justin-epbx  3,000 Units SubCUTAneous Once per day on Mon Wed Fri    sodium chloride flush  5-40 mL IntraVENous 2 times per day    [Held by provider] enoxaparin  30 mg SubCUTAneous Q24H    [Held by provider] aspirin EC  81 mg Oral Daily    montelukast  10 mg Oral Nightly    mometasone-formoterol  2 puff Inhalation BID    rOPINIRole  0.5 mg Oral Daily    carvedilol  50 mg Oral BID WC    [Held by provider] tamsulosin  0.4 mg Oral Nightly    levothyroxine  175 mcg Oral Daily    oxybutynin  5 mg Oral Nightly     PRN Meds: albuterol sulfate HFA, doxepin, glucose, dextrose bolus **OR** dextrose bolus, glucagon (rDNA), dextrose, sodium chloride flush, sodium chloride, ondansetron **OR** ondansetron, polyethylene glycol, acetaminophen **OR** acetaminophen, oxyCODONE-acetaminophen, nitroGLYCERIN, melatonin    I/O:     Intake/Output Summary (Last 24 hours) at 8/11/2022 0724  Last data filed at 8/11/2022 0500  Gross per 24 hour   Intake 1290 ml   Output 2350 ml   Net -1060 ml         Diet:  ADULT DIET; Regular; 4 carb choices (60 gm/meal); Low Sodium (2 gm); Low Potassium (Less than 3000 mg/day); Low Phosphorus (Less than 1000 mg); 1800 ml    Exam:  /66   Pulse 60   Temp 97.6 °F (36.4 °C) (Oral)   Resp 18   Ht 6' 1\" (1.854 m)   Wt (!) 333 lb 5.4 oz (151.2 kg)   SpO2 95%   BMI 43.98 kg/m²   General:  Chronically ill appearing male. NAD. HEENT:  normocephalic and atraumatic. No scleral icterus. PERR. Neck: supple. No JVD. No thyromegaly. Lungs: clear to auscultation. No retractions  Cardiac: RRR without murmur. Abdomen: soft. Nontender. Bowel sounds positive. Extremities:  No clubbing, cyanosis, or edema. 2+ BLE edema. Vasculature: capillary refill < 3 seconds. Palpable LE pulses bilaterally. Skin:  warm and dry. Psych:  Alert and oriented x3. Affect appropriate  Lymph:  No supraclavicular adenopathy. Neurologic:  No focal deficit. No seizures.       Data: (All radiographs, tracings, PFTs, and imaging are personally viewed and interpreted unless otherwise noted)  Labs:   Recent Labs     08/09/22  0626 08/10/22  0019 08/11/22  0456   WBC 6.2 5.2 5.2   HGB 7.8* 7.4* 7.1*   HCT 26.9* 24.8* 24.5*    238 191       Recent Labs     08/09/22  0626 08/09/22  0803 08/10/22  0019 08/10/22  0705 08/10/22  1638 08/11/22  0456     --  137 138  --  136   K 6.3*   < > 6.2*  6.1* 6.7* 4.6 5.3*     --  103 103  --  101   CO2 21*  --  20* 22*  --  23   BUN 71*  --  64* 66*  --  43*   CREATININE 6.3*  --  6.4* 6.5*  --  4.8*   CALCIUM 9.5  --  9.0 9.5  --  9.0   PHOS 7.7*  --   --   --   --   --     < > = values in this interval not displayed. Recent Labs     08/08/22  1909   AST 9   ALT 9*   BILITOT 0.3   ALKPHOS 91       No results for input(s): INR in the last 72 hours. No results for input(s): Cherre Gash in the last 72 hours. Urinalysis:   Lab Results   Component Value Date/Time    NITRU NEGATIVE 03/23/2022 04:45 AM    WBCUA 5-9 03/23/2022 04:45 AM    BACTERIA NONE SEEN 03/23/2022 04:45 AM    RBCUA 25-50 03/23/2022 04:45 AM    BLOODU MODERATE 03/23/2022 04:45 AM    SPECGRAV 1.015 03/23/2022 04:45 AM    GLUCOSEU 500 04/13/2021 08:46 AM     Urine culture:   Lab Results   Component Value Date/Time    LABURIN  10/13/2020 02:28 PM     No growth-preliminary Growth of Contaminants. The mixture of organisms present are not a common cause of urinary tract infections and probably represent skin marco or distal urethral marco. Micro:   Blood culture #1:   Lab Results   Component Value Date/Time    BC No growth-preliminary No growth  03/16/2022 12:02 PM     Blood culture #2:No results found for: Andrew Mccord  Organism:  Lab Results   Component Value Date/Time    ORG Staphylococcus aureus 03/17/2022 01:00 AM         Lab Results   Component Value Date/Time    LABGRAM  03/17/2022 01:00 AM     Moderate segmented neutrophils observed. Rare epithelial cells observed.  Many gram positive cocci in pairs and clusters. MRSA culture only:No results found for: [facility]  Respiratory culture: No results found for: CULTRESP  Aerobic and Anaerobic :  Lab Results   Component Value Date/Time    LABAERO  03/17/2022 01:00 AM     Culture also yielded heavy growth of gram positive bacilli most consistent with Corynebacterium species. LABAERO  03/17/2022 01:00 AM     moderate growth In the treatment of gram positive infections, GENTAMICIN should be CONSIDERED a SYNERGYSTIC agent ONLY. Ciprofloxacin and Levofloxacin, regardless of in vitro sensitivity, should not be used for staphylococcal infections other than uncomplicated lower UTIs. Moxifloxacin, regardless of in vitro sensitivity, should not be used for staphylococcal infections. Lab Results   Component Value Date/Time    LABANAE  03/17/2022 01:00 AM     No anaerobes isolated- preliminary No anaerobes isolated        Radiology Reports:  IR FLUORO GUIDED CVA DEVICE PLMT/REPLACE/REMOVAL   Final Result   Status post successful nontunneled dialysis catheter insertion. **This report has been created using voice recognition software. It may contain minor errors which are inherent in voice recognition technology. **      Final report electronically signed by Dr. Adeline Neumann on 8/10/2022 11:24 AM      US RENAL COMPLETE   Final Result   1. No hydronephrosis. 2. Bilateral renal cysts. 3. Increased postvoid bladder residual and 65 mL. **This report has been created using voice recognition software. It may contain minor errors which are inherent in voice recognition technology. **      Final report electronically signed by Dr Tracey Mason on 8/9/2022 4:06 PM        US RENAL COMPLETE    Result Date: 8/9/2022  PROCEDURE: US RENAL COMPLETE CLINICAL INFORMATION: Acute Kidney Injury COMPARISON: CT abdomen and pelvis 3/19/2022 TECHNIQUE: Grayscale and color sonographic imaging of the kidneys performed in longitudinal and transverse planes. FINDINGS: RIGHT KIDNEY - 12.9 x 6.2 x 5.9 cm Resistive Index - 0.61 Cortical Thickness - 1.3 cm LEFT KIDNEY - 13.7 x 8.1 x 7.2 cm Resistive Index - 0.61 Cortical Thickness - 1.8 cm URINARY BLADDER Pre-Void - 179.5 mL Post-Void - 65 mL No hydronephrosis. No calculus disease. No solid renal mass. A 4.2 x 4.1 x 3.8 cm cyst is seen in the midpole of the right kidney. A 1.4 cm cyst is seen in the superior pole of the right kidney. A 6.5 x 5.4 x 5.3 cm cyst is seen in the midpole of the left kidney. A 6.2 x 5.9 x 5.7 cm septated cyst is seen in the midpole of the left kidney. No bladder mass. The post void bladder residual is 65 mL. 1. No hydronephrosis. 2. Bilateral renal cysts. 3. Increased postvoid bladder residual and 65 mL. **This report has been created using voice recognition software. It may contain minor errors which are inherent in voice recognition technology. ** Final report electronically signed by Dr Robert Valdez on 2022 4:06 PM        Tele:   [x] yes             [] no      Active Hospital Problems    Diagnosis Date Noted    [facility] [N17.0] 08/10/2022     Priority: Medium    Hyperkalemia [E87.5] 2022     Priority: Medium    Acute renal failure superimposed on stage 5 chronic kidney disease, not on chronic dialysis ([facility] [N17.9, N18.5]        Electronically signed by Bebeto Liriano PA-C on 2022 at 7:24 AM

## 2022-08-11 NOTE — FLOWSHEET NOTE
08/11/22 1027   Safe Environment   Safety Measures Other (comment)  (virtual safety roudn complete)   Virtual RN rounds, pt out of room at this time

## 2022-08-11 NOTE — FLOWSHEET NOTE
08/11/22 0750 08/11/22 1135   Vital Signs   BP (!) 140/83 (!) 142/79   Temp 98.6 °F (37 °C) 98 °F (36.7 °C)   Heart Rate 58 64   Resp 12 12   SpO2 97 % 97 %   Weight (!) 333 lb 14.4 oz (151.5 kg) (!) 333 lb 14.4 oz (151.5 kg)   Weight Method Bed scale Bed scale   Percent Weight Change 0.17 0   Post-Hemodialysis Assessment   Post-Treatment Procedures  --  Blood returned;Catheter Capped, clamped with Saline x2 ports   Machine Disinfection Process  --  Acid/Vinegar Clean;Heat Disinfect; Exterior Machine Disinfection   Rinseback Volume (ml)  --  400 ml   Blood Volume Processed (Liters)  --  59 l/min   Dialyzer Clearance  --  Lightly streaked   Duration of Treatment (minutes)  --  210 minutes   Hemodialysis Intake (ml)  --  400 ml   Hemodialysis Output (ml)  --  400 ml   NET Removed (ml)  --  0   Stable 3.5 hr tx. No fluid removed during HD; patient tolerated well. Both ports of hemodialysis catheter flushed and clamped per protocol. Treatment record printed for scanning.  Report called to primary RN

## 2022-08-11 NOTE — PROGRESS NOTES
[facility]   Nephrology progress note  8/11/2022, 11:54 AM      Pt Name:    Karli Ricks  MRN:     [id number]     YOB: 1960  Admit Date:    8/8/2022  6:24 PM    Chief Complaint: Nephrology following for CAROLE/CKD. Subjective:  Patient seen and examined  Patient was seen in the dialysis unit  Tolerating dialysis treatment well  Potassium came down but still high  Making some urine  On IV fluids  Diarrhea has stopped    Objective:  24HR INTAKE/OUTPUT:    Intake/Output Summary (Last 24 hours) at 8/11/2022 1154  Last data filed at 8/11/2022 1135  Gross per 24 hour   Intake 1420 ml   Output 2750 ml   Net -1330 ml           I/O last 3 completed shifts: In: 4501.4 [P.O.:1180; I.V.:2720.5; IV Piggyback:200.9]  Out: 3500 [Urine:3100]  I/O this shift:   In: 400   Out: 400      Admission weight: (!) 329 lb (149.2 kg)  Wt Readings from Last 3 Encounters:   08/11/22 (!) 333 lb 14.4 oz (151.5 kg)   08/04/22 (!) 329 lb (149.2 kg)   07/25/22 (!) 329 lb (149.2 kg)        Vitals :   Vitals:    08/10/22 2303 08/11/22 0400 08/11/22 0750 08/11/22 1135   BP: (!) 145/70 127/66 (!) 140/83 (!) 142/79   Pulse: 72 60 58 64   Resp: 18 18 12 12   Temp: 98.4 °F (36.9 °C) 97.6 °F (36.4 °C) 98.6 °F (37 °C) 98 °F (36.7 °C)   TempSrc: Oral Oral     SpO2: 93% 95% 97% 97%   Weight:   (!) 333 lb 14.4 oz (151.5 kg) (!) 333 lb 14.4 oz (151.5 kg)   Height:           Physical examination  General Appearance: alert and cooperative with exam, appears comfortable, no distress  Mouth/Throat: Oral mucosa moist  Neck: No JVD  Lungs: Air entry B/L, no rales, no use of accessory muscles  Heart:  S1, S2 heard  GI: soft, non-tender, no guarding, + obese  Extremities: No pitting LE edema    Medications:  Infusion:    sodium chloride 50 mL/hr at 08/11/22 0934    dextrose      sodium chloride       Meds:    rOPINIRole  1 mg Oral Nightly    epoetin justin-epbx  2,000 Units SubCUTAneous Once per day on Mon Wed Fri    And    epoetin justin-epbx  3,000 Units SubCUTAneous Once per day on Mon Wed Fri    sodium chloride flush  5-40 mL IntraVENous 2 times per day    [Held by provider] enoxaparin  30 mg SubCUTAneous Q24H    [Held by provider] aspirin EC  81 mg Oral Daily    montelukast  10 mg Oral Nightly    mometasone-formoterol  2 puff Inhalation BID    rOPINIRole  0.5 mg Oral Daily    carvedilol  50 mg Oral BID WC    [Held by provider] tamsulosin  0.4 mg Oral Nightly    levothyroxine  175 mcg Oral Daily    oxybutynin  5 mg Oral Nightly     Meds prn: albuterol sulfate HFA, doxepin, glucose, dextrose bolus **OR** dextrose bolus, glucagon (rDNA), dextrose, sodium chloride flush, sodium chloride, ondansetron **OR** ondansetron, polyethylene glycol, acetaminophen **OR** acetaminophen, oxyCODONE-acetaminophen, nitroGLYCERIN, melatonin     Lab Data :  CBC:   Recent Labs     08/09/22  0626 08/10/22  0019 08/11/22  0456   WBC 6.2 5.2 5.2   HGB 7.8* 7.4* 7.1*   HCT 26.9* 24.8* 24.5*    238 191       CMP:  Recent Labs     08/08/22  1909 08/08/22  2152 08/09/22  0626 08/09/22  0803 08/10/22  0019 08/10/22  0705 08/10/22  1638 08/11/22  0456      < > 138  --  137 138  --  136   K 7.0*   < > 6.3*   < > 6.2*  6.1* 6.7* 4.6 5.3*   CL 99   < > 102  --  103 103  --  101   CO2 23   < > 21*  --  20* 22*  --  23   BUN 72*   < > 71*  --  64* 66*  --  43*   CREATININE 6.6*   < > 6.3*  --  6.4* 6.5*  --  4.8*   GLUCOSE 65*   < > 78  --  105 89  --  97   CALCIUM 9.6   < > 9.5  --  9.0 9.5  --  9.0   MG 2.5*  --  2.5*  --   --   --   --   --    PHOS  --   --  7.7*  --   --   --   --   --     < > = values in this interval not displayed. Hepatic:   Recent Labs     08/08/22 1909   LABALBU 3.7   AST 9   ALT 9*   BILITOT 0.3   ALKPHOS 91           Assessment and Plan:  1. 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    Started patient on dialysis yesterday due to refractory hyperkalemia  Patient still making urine but needs further optimization of electrolyte imbalance  Therefore plans made to do another round of dialysis treatment which patient is tolerating well  No diarrhea. No shortness of breath. No significant lower extremity swelling. Continue with IV fluids but will reduce rate  Monitor interdialytic creatinine--watch for renal recovery  Check labs in a.m. Will hold aspirin in case if patient needs tunneled dialysis catheter  Thought process reviewed with patient in detail  Indications for dialysis, long-term prognosis etc. was discussed in detail  Patient had several questions which were all answered    2. Hyperkalemia, refractory. Improving with dialysis  3. Diabetic nephropathy with proteinuria  4. Hypertension  5. Anemia in CKD. Iron saturation 12%. Start Venofer. Check a stool for occult blood  6. Obesity  7. Sleep apnea  8. Hyperphosphatemia. Continue Renvela    D/W patient and HD RN    Jaydon Lomeli MD  [facility]    This report has been created using voice recognition software.  It may contain minor errors which are inherent in voice recognition technology

## 2022-08-11 NOTE — PLAN OF CARE
Problem: Discharge Planning  Goal: Discharge to home or other facility with appropriate resources  Outcome: Progressing  Flowsheets (Taken 8/11/2022 0750)  Discharge to home or other facility with appropriate resources: Identify barriers to discharge with patient and caregiver     Problem: Pain  Goal: Verbalizes/displays adequate comfort level or baseline comfort level  Outcome: Progressing  Flowsheets (Taken 8/11/2022 1655)  Verbalizes/displays adequate comfort level or baseline comfort level:   Encourage patient to monitor pain and request assistance   Assess pain using appropriate pain scale     Problem: Chronic Conditions and Co-morbidities  Goal: Patient's chronic conditions and co-morbidity symptoms are monitored and maintained or improved  Outcome: Progressing  Flowsheets (Taken 8/11/2022 0750)  Care Plan - Patient's Chronic Conditions and Co-Morbidity Symptoms are Monitored and Maintained or Improved: Monitor and assess patient's chronic conditions and comorbid symptoms for stability, deterioration, or improvement     Problem: Safety - Adult  Goal: Free from fall injury  Outcome: Progressing  Flowsheets  Taken 8/11/2022 1655 by Chel Trinidad RN  Free From Fall Injury: Instruct family/caregiver on patient safety    Problem: ABCDS Injury Assessment  Goal: Absence of physical injury  Outcome: Progressing  Flowsheets  Taken 8/11/2022 1655 by Chel Trinidad RN  Absence of Physical Injury: Implement safety measures based on patient assessment    Problem: Skin/Tissue Integrity  Goal: Absence of new skin breakdown  Description: 1. Monitor for areas of redness and/or skin breakdown  2. Assess vascular access sites hourly  3. Every 4-6 hours minimum:  Change oxygen saturation probe site  4. Every 4-6 hours:  If on nasal continuous positive airway pressure, respiratory therapy assess nares and determine need for appliance change or resting period. Outcome: Progressing      Care plan reviewed with patient. Patient verbalize understanding of the plan of care and contribute to goal setting.

## 2022-08-11 NOTE — PROGRESS NOTES
C[facility] [address] 62  INPATIENT OCCUPATIONAL THERAPY  [facility] ICU STEPDOWN TELEMETRY 4K  EVALUATION    Time:    Time In: 4957  Time Out: 1334  Timed Code Treatment Minutes: 23 Minutes  Minutes: 37          Date: 2022  Patient Name: Sharon Francis,   Gender: male      MRN: [id number]  : 1960  (63 y.o.)  Referring Practitioner: Monica Farris PA-C  Diagnosis: hypokalemia  Additional Pertinent Hx: Per ER note on 2022:61 y.o. male who presents to the ED for evaluation of abnormal labs. Patient states he had routine lab work done and his nephrologist called him and told him he needed to come to the emergency room because the labs were abnormal.  Patient reports he has been weak and fatigued today. Had to call his wife to help him get from the car into the house after he went and had his labs drawn. s/p Dialysis Catheter insertion on 8/10/2022. Restrictions/Precautions:  Restrictions/Precautions: Fall Risk  Position Activity Restriction  Other position/activity restrictions: 2L O2 on 2022    Subjective  Chart Reviewed: Yes, Orders, Progress Notes, History and Physical  Patient assessed for rehabilitation services?: Yes  Family / Caregiver Present: Yes (wife & grandchildren)    Subjective: cooperative; reports having had a bad night & am. Pt reports feeling really cold-noted Pt with many blankets & MHP on. Pain: 0/10: no c/o pain during session    Vitals: Oxygen: 97-95% on 2L O2    Social/Functional History:  Lives With: Spouse  Type of Home: House  Home Layout: Work area in basement, Two level, Performs ADL's on one level  Home Access: Stairs to enter with rails (4)  Home Equipment: Cane, quad, Oxygen (2-4L O2 at home)   Bathroom Shower/Tub: Walk-in shower  Bathroom Toilet: Standard       ADL Assistance: Independent  Ambulation Assistance: Independent  Transfer Assistance: Independent          Additional Comments: Pt reports using no AD at home, quad cane for community distance.  Has been sleeping in recliner on 1st level, has bathroom on 1st level-\"man cave\" in basement. Pt reports falling back into recliner x 2 recently d/t being dizzy headed. 2-4L O2 all the time at home. VISION:Corrected    HEARING:  WFL    COGNITION: WFL    RANGE OF MOTION:  Bilateral Upper Extremity:  WFL    STRENGTH:  Bilateral Upper Extremity:  WFL    SENSATION:   WFL    ADL:   Lower Extremity Dressing: Dependent. For adjusting slipper socks . **initiated ed on possible LH AE-recent obtained LH bath sponge, thinks wife might have sockaide in storage    BALANCE:  Standing Balance: [address] BED MOBILITY:  Not Tested    TRANSFERS:  Sit to Stand:  Contact Guard Assistance. From recliner  Stand to Sit: [address] To recliner  **educated Pt on taking transitions in movement slowly    FUNCTIONAL MOBILITY:  Assistive Device: Rolling Walker  Assist Level:  Contact Guard Assistance. Distance:  10ft in room  Educated Pt on walker safety & bariatric walker recommendation     Activity Tolerance:  Patient tolerance of  treatment: fair. Assessment:  Assessment: Pt demo decreased ADL & functional mobility status over PLOF s/p admission with hyperkalemia & new dialysis. Continued OT recommended to faciliate improved endurance, balance, & safety as well as educate on adaptive strategies  for returning to ADLs at home with family. Performance deficits / Impairments: Decreased functional mobility , Decreased endurance, Decreased ADL status, Decreased safe awareness, Decreased balance  Prognosis: Fair  REQUIRES OT FOLLOW-UP: Yes  Decision Making: Medium Complexity    Treatment Initiated: Treatment and education initiated within context of evaluation.   Evaluation time included review of current medical information, gathering information related to past medical, social and functional history, completion of standardized testing, formal and informal observation of tasks, assessment of data and development of plan of care and goals. Treatment time included skilled education and facilitation of tasks to increase safety and independence with ADL's for improved functional independence and quality of life. Discharge Recommendations:  Continue to assess pending progress, Home with Home health OT    Patient Education:     Patient Education  Education Given To: Patient  Education Provided: Role of Therapy, Plan of Care  Education Method: Verbal, Demonstration  Barriers to Learning: None  Education Outcome: Continued education needed    Equipment Recommendations:  Equipment Needed: Yes  Other: Monitor for bariatric RW need    Plan:  Times per Week: 5x  Current Treatment Recommendations: Balance training, Self-Care / ADL, Functional mobility training, Endurance training, Patient/Caregiver education & training, Safety education & training. See long-term goal time frame for expected duration of plan of care. If no long-term goals established, a short length of stay is anticipated. Goals:  Patient goals : Pt reports wanting to be able to complete ADLs without being so tired  Short Term Goals  Time Frame for Short term goals: until discharge  Short Term Goal 1: Pt will complete various t/fs including toilet with S & 0-2 vcs for safety  Short Term Goal 2: Pt will tolerate standing 2-3 min with S for increased ease of sinkside grooming  Short Term Goal 3: Pt will complete LE dressing with min A & LH AE prn  Short Term Goal 4: Pt will complete mobility to/from bathroom with RW, S, & 0-2 vcs for safety  Long Term Goals  Time Frame for Long term goals : No LTG set d/t short ELOS         Following session, patient left in safe position with all fall risk precautions in place.

## 2022-08-11 NOTE — FLOWSHEET NOTE
08/11/22 1425   Safe Environment   Safety Measures Other (comment)  (virtual safety round complete)   Virtual nurse rounds, when audio in, conversation in room not interrupted and camera not turned on.

## 2022-08-12 LAB
ABO: NORMAL
ABSOLUTE RETIC #: 57 THOU/MM3 (ref 20–115)
ANION GAP SERPL CALCULATED.3IONS-SCNC: 11 MEQ/L (ref 8–16)
ANTIBODY SCREEN: NORMAL
BUN BLDV-MCNC: 24 MG/DL (ref 7–22)
CALCIUM SERPL-MCNC: 8.6 MG/DL (ref 8.5–10.5)
CHLORIDE BLD-SCNC: 98 MEQ/L (ref 98–111)
CO2: 27 MEQ/L (ref 23–33)
CREAT SERPL-MCNC: 4 MG/DL (ref 0.4–1.2)
ERYTHROCYTE [DISTWIDTH] IN BLOOD BY AUTOMATED COUNT: 17.2 % (ref 11.5–14.5)
ERYTHROCYTE [DISTWIDTH] IN BLOOD BY AUTOMATED COUNT: 53.5 FL (ref 35–45)
FOLATE: 5.2 NG/ML (ref 4.8–24.2)
GFR SERPL CREATININE-BSD FRML MDRD: 15 ML/MIN/1.73M2
GLUCOSE BLD-MCNC: 133 MG/DL (ref 70–108)
GLUCOSE BLD-MCNC: 80 MG/DL (ref 70–108)
GLUCOSE BLD-MCNC: 84 MG/DL (ref 70–108)
GLUCOSE BLD-MCNC: 88 MG/DL (ref 70–108)
GLUCOSE BLD-MCNC: 93 MG/DL (ref 70–108)
HCT VFR BLD CALC: 23 % (ref 42–52)
HCT VFR BLD CALC: 24.8 % (ref 42–52)
HEMOCCULT STL QL: NEGATIVE
HEMOGLOBIN: 6.8 GM/DL (ref 14–18)
HEMOGLOBIN: 7.6 GM/DL (ref 14–18)
IMMATURE RETIC FRACT: 28.1 % (ref 2.3–13.4)
MCH RBC QN AUTO: 25.4 PG (ref 26–33)
MCHC RBC AUTO-ENTMCNC: 29.6 GM/DL (ref 32.2–35.5)
MCV RBC AUTO: 85.8 FL (ref 80–94)
PLATELET # BLD: 175 THOU/MM3 (ref 130–400)
PMV BLD AUTO: 8.6 FL (ref 9.4–12.4)
POTASSIUM SERPL-SCNC: 4.4 MEQ/L (ref 3.5–5.2)
RBC # BLD: 2.68 MILL/MM3 (ref 4.7–6.1)
RETIC HEMOGLOBIN: 29.2 PG (ref 28.2–35.7)
RETICULOCYTE ABSOLUTE COUNT: 2.1 % (ref 0.5–2)
RH FACTOR: NORMAL
SODIUM BLD-SCNC: 136 MEQ/L (ref 135–145)
VITAMIN B-12: 398 PG/ML (ref 211–911)
WBC # BLD: 4.9 THOU/MM3 (ref 4.8–10.8)

## 2022-08-12 PROCEDURE — 86901 BLOOD TYPING SEROLOGIC RH(D): CPT

## 2022-08-12 PROCEDURE — 85027 COMPLETE CBC AUTOMATED: CPT

## 2022-08-12 PROCEDURE — 85014 HEMATOCRIT: CPT

## 2022-08-12 PROCEDURE — 6360000002 HC RX W HCPCS: Performed by: INTERNAL MEDICINE

## 2022-08-12 PROCEDURE — 82948 REAGENT STRIP/BLOOD GLUCOSE: CPT

## 2022-08-12 PROCEDURE — 86923 COMPATIBILITY TEST ELECTRIC: CPT

## 2022-08-12 PROCEDURE — 2580000003 HC RX 258: Performed by: INTERNAL MEDICINE

## 2022-08-12 PROCEDURE — 86900 BLOOD TYPING SEROLOGIC ABO: CPT

## 2022-08-12 PROCEDURE — 82746 ASSAY OF FOLIC ACID SERUM: CPT

## 2022-08-12 PROCEDURE — 94761 N-INVAS EAR/PLS OXIMETRY MLT: CPT

## 2022-08-12 PROCEDURE — P9016 RBC LEUKOCYTES REDUCED: HCPCS

## 2022-08-12 PROCEDURE — 99232 SBSQ HOSP IP/OBS MODERATE 35: CPT | Performed by: INTERNAL MEDICINE

## 2022-08-12 PROCEDURE — 86850 RBC ANTIBODY SCREEN: CPT

## 2022-08-12 PROCEDURE — 82272 OCCULT BLD FECES 1-3 TESTS: CPT

## 2022-08-12 PROCEDURE — 82607 VITAMIN B-12: CPT

## 2022-08-12 PROCEDURE — 6370000000 HC RX 637 (ALT 250 FOR IP)

## 2022-08-12 PROCEDURE — 1200000003 HC TELEMETRY R&B

## 2022-08-12 PROCEDURE — 94760 N-INVAS EAR/PLS OXIMETRY 1: CPT

## 2022-08-12 PROCEDURE — 94640 AIRWAY INHALATION TREATMENT: CPT

## 2022-08-12 PROCEDURE — 85046 RETICYTE/HGB CONCENTRATE: CPT

## 2022-08-12 PROCEDURE — 2580000003 HC RX 258

## 2022-08-12 PROCEDURE — 36430 TRANSFUSION BLD/BLD COMPNT: CPT

## 2022-08-12 PROCEDURE — 2700000000 HC OXYGEN THERAPY PER DAY

## 2022-08-12 PROCEDURE — 85018 HEMOGLOBIN: CPT

## 2022-08-12 PROCEDURE — 6370000000 HC RX 637 (ALT 250 FOR IP): Performed by: PHYSICIAN ASSISTANT

## 2022-08-12 PROCEDURE — 36415 COLL VENOUS BLD VENIPUNCTURE: CPT

## 2022-08-12 PROCEDURE — 80048 BASIC METABOLIC PNL TOTAL CA: CPT

## 2022-08-12 RX ORDER — SODIUM CHLORIDE 9 MG/ML
INJECTION, SOLUTION INTRAVENOUS PRN
Status: DISCONTINUED | OUTPATIENT
Start: 2022-08-12 | End: 2022-08-13 | Stop reason: ALTCHOICE

## 2022-08-12 RX ADMIN — SODIUM CHLORIDE: 9 INJECTION, SOLUTION INTRAVENOUS at 13:16

## 2022-08-12 RX ADMIN — OXYBUTYNIN CHLORIDE 5 MG: 5 TABLET, EXTENDED RELEASE ORAL at 20:42

## 2022-08-12 RX ADMIN — Medication 4.5 MG: at 20:43

## 2022-08-12 RX ADMIN — ONDANSETRON 4 MG: 4 TABLET, ORALLY DISINTEGRATING ORAL at 22:46

## 2022-08-12 RX ADMIN — CARVEDILOL 50 MG: 25 TABLET, FILM COATED ORAL at 08:37

## 2022-08-12 RX ADMIN — DOXEPIN HYDROCHLORIDE 50 MG: 50 CAPSULE ORAL at 20:51

## 2022-08-12 RX ADMIN — OXYCODONE AND ACETAMINOPHEN 1 TABLET: 10; 325 TABLET ORAL at 20:42

## 2022-08-12 RX ADMIN — MOMETASONE FUROATE AND FORMOTEROL FUMARATE DIHYDRATE 2 PUFF: 100; 5 AEROSOL RESPIRATORY (INHALATION) at 18:12

## 2022-08-12 RX ADMIN — ONDANSETRON 4 MG: 4 TABLET, ORALLY DISINTEGRATING ORAL at 10:10

## 2022-08-12 RX ADMIN — CARVEDILOL 50 MG: 25 TABLET, FILM COATED ORAL at 17:07

## 2022-08-12 RX ADMIN — MONTELUKAST SODIUM 10 MG: 10 TABLET ORAL at 20:42

## 2022-08-12 RX ADMIN — ROPINIROLE HYDROCHLORIDE 0.5 MG: 1 TABLET, FILM COATED ORAL at 12:22

## 2022-08-12 RX ADMIN — SODIUM CHLORIDE, PRESERVATIVE FREE 10 ML: 5 INJECTION INTRAVENOUS at 20:51

## 2022-08-12 RX ADMIN — EPOETIN ALFA-EPBX 2000 UNITS: 2000 INJECTION, SOLUTION INTRAVENOUS; SUBCUTANEOUS at 08:50

## 2022-08-12 RX ADMIN — SODIUM CHLORIDE, PRESERVATIVE FREE 10 ML: 5 INJECTION INTRAVENOUS at 08:38

## 2022-08-12 RX ADMIN — OXYCODONE AND ACETAMINOPHEN 1 TABLET: 10; 325 TABLET ORAL at 12:17

## 2022-08-12 RX ADMIN — EPOETIN ALFA-EPBX 3000 UNITS: 3000 INJECTION, SOLUTION INTRAVENOUS; SUBCUTANEOUS at 08:50

## 2022-08-12 RX ADMIN — IRON SUCROSE 200 MG: 20 INJECTION, SOLUTION INTRAVENOUS at 13:20

## 2022-08-12 RX ADMIN — LEVOTHYROXINE SODIUM 175 MCG: 0.03 TABLET ORAL at 05:00

## 2022-08-12 RX ADMIN — ROPINIROLE HYDROCHLORIDE 1 MG: 1 TABLET, FILM COATED ORAL at 20:42

## 2022-08-12 RX ADMIN — MOMETASONE FUROATE AND FORMOTEROL FUMARATE DIHYDRATE 2 PUFF: 100; 5 AEROSOL RESPIRATORY (INHALATION) at 05:02

## 2022-08-12 RX ADMIN — OXYCODONE AND ACETAMINOPHEN 1 TABLET: 10; 325 TABLET ORAL at 03:50

## 2022-08-12 ASSESSMENT — PAIN DESCRIPTION - ORIENTATION
ORIENTATION: LOWER

## 2022-08-12 ASSESSMENT — PAIN SCALES - GENERAL
PAINLEVEL_OUTOF10: 6
PAINLEVEL_OUTOF10: 8
PAINLEVEL_OUTOF10: 8
PAINLEVEL_OUTOF10: 6
PAINLEVEL_OUTOF10: 9
PAINLEVEL_OUTOF10: 7
PAINLEVEL_OUTOF10: 5
PAINLEVEL_OUTOF10: 8
PAINLEVEL_OUTOF10: 6

## 2022-08-12 ASSESSMENT — PAIN - FUNCTIONAL ASSESSMENT
PAIN_FUNCTIONAL_ASSESSMENT: PREVENTS OR INTERFERES SOME ACTIVE ACTIVITIES AND ADLS
PAIN_FUNCTIONAL_ASSESSMENT: ACTIVITIES ARE NOT PREVENTED
PAIN_FUNCTIONAL_ASSESSMENT: PREVENTS OR INTERFERES SOME ACTIVE ACTIVITIES AND ADLS
PAIN_FUNCTIONAL_ASSESSMENT: ACTIVITIES ARE NOT PREVENTED

## 2022-08-12 ASSESSMENT — PAIN DESCRIPTION - LOCATION
LOCATION: BACK

## 2022-08-12 ASSESSMENT — PAIN DESCRIPTION - PAIN TYPE
TYPE: CHRONIC PAIN

## 2022-08-12 ASSESSMENT — PAIN SCALES - WONG BAKER: WONGBAKER_NUMERICALRESPONSE: 0

## 2022-08-12 ASSESSMENT — PAIN DESCRIPTION - ONSET
ONSET: ON-GOING

## 2022-08-12 ASSESSMENT — PAIN DESCRIPTION - DESCRIPTORS
DESCRIPTORS: ACHING;DISCOMFORT;DULL
DESCRIPTORS: ACHING
DESCRIPTORS: DULL;THROBBING
DESCRIPTORS: DULL;ACHING
DESCRIPTORS: ACHING

## 2022-08-12 ASSESSMENT — PAIN DESCRIPTION - FREQUENCY
FREQUENCY: CONTINUOUS

## 2022-08-12 NOTE — PLAN OF CARE
Problem: Discharge Planning  Goal: Discharge to home or other facility with appropriate resources  8/12/2022 1532 by Lizbeth Ruiz RN  Flowsheets (Taken 8/12/2022 1532)  Discharge to home or other facility with appropriate resources:   Identify barriers to discharge with patient and caregiver   Identify discharge learning needs (meds, wound care, etc)   Refer to discharge planning if patient needs post-hospital services based on physician order or complex needs related to functional status, cognitive ability or social support system   Arrange for needed discharge resources and transportation as appropriate     Problem: Pain  Goal: Verbalizes/displays adequate comfort level or baseline comfort level  Outcome: Progressing  Flowsheets (Taken 8/12/2022 1532)  Verbalizes/displays adequate comfort level or baseline comfort level:   Encourage patient to monitor pain and request assistance   Assess pain using appropriate pain scale   Implement non-pharmacological measures as appropriate and evaluate response     Problem: Chronic Conditions and Co-morbidities  Goal: Patient's chronic conditions and co-morbidity symptoms are monitored and maintained or improved  Outcome: Progressing  Flowsheets (Taken 8/12/2022 1532)  Care Plan - Patient's Chronic Conditions and Co-Morbidity Symptoms are Monitored and Maintained or Improved:   Monitor and assess patient's chronic conditions and comorbid symptoms for stability, deterioration, or improvement   Collaborate with multidisciplinary team to address chronic and comorbid conditions and prevent exacerbation or deterioration   Update acute care plan with appropriate goals if chronic or comorbid symptoms are exacerbated and prevent overall improvement and discharge     Problem: Safety - Adult  Goal: Free from fall injury  Outcome: Progressing  Flowsheets (Taken 8/12/2022 1532)  Free From Fall Injury:   Instruct family/caregiver on patient safety   Based on caregiver fall risk screen, instruct family/caregiver to ask for assistance with transferring infant if caregiver noted to have fall risk factors  Note: Hourly rounding, call light within reach, educated on fall risks     Problem: Skin/Tissue Integrity  Goal: Absence of new skin breakdown  Description: 1. Monitor for areas of redness and/or skin breakdown  2. Assess vascular access sites hourly  3. Every 4-6 hours minimum:  Change oxygen saturation probe site  4. Every 4-6 hours:  If on nasal continuous positive airway pressure, respiratory therapy assess nares and determine need for appliance change or resting period.   Outcome: Progressing  Note: Educated q2hr turns while in bed      Problem: Respiratory - Adult  Goal: Achieves optimal ventilation and oxygenation  Outcome: Progressing  Flowsheets (Taken 8/12/2022 1532)  Achieves optimal ventilation and oxygenation:   Assess for changes in respiratory status   Assess for changes in mentation and behavior   Position to facilitate oxygenation and minimize respiratory effort   Oxygen supplementation based on oxygen saturation or arterial blood gases     Problem: Cardiovascular - Adult  Goal: Maintains optimal cardiac output and hemodynamic stability  Outcome: Progressing  Flowsheets (Taken 8/12/2022 1532)  Maintains optimal cardiac output and hemodynamic stability:   Monitor blood pressure and heart rate   Monitor urine output and notify Licensed Independent Practitioner for values outside of normal range   Assess for signs of decreased cardiac output     Problem: Skin/Tissue Integrity - Adult  Goal: Oral mucous membranes remain intact  Outcome: Progressing  Flowsheets (Taken 8/12/2022 1532)  Oral Mucous Membranes Remain Intact:   Assess oral mucosa and hygiene practices   Implement preventative oral hygiene regimen   Implement oral medicated treatments as ordered     Problem: Musculoskeletal - Adult  Goal: Return mobility to safest level of function  Outcome: Progressing  Flowsheets (Taken 8/12/2022 1532)  Return Mobility to Safest Level of Function:   Assess patient stability and activity tolerance for standing, transferring and ambulating with or without assistive devices   Assist with transfers and ambulation using safe patient handling equipment as needed   Ensure adequate protection for wounds/incisions during mobilization   Obtain physical therapy/occupational therapy consults as needed   Instruct patient/family in ordered activity level     Problem: Genitourinary - Adult  Goal: Absence of urinary retention  Outcome: Progressing  Flowsheets (Taken 8/12/2022 1532)  Absence of urinary retention:   Assess patients ability to void and empty bladder   Monitor intake/output and perform bladder scan as needed     Problem: Metabolic/Fluid and Electrolytes - Adult  Goal: Electrolytes maintained within normal limits  Outcome: Progressing  Flowsheets (Taken 8/12/2022 1532)  Electrolytes maintained within normal limits:   Monitor labs and assess patient for signs and symptoms of electrolyte imbalances   Administer electrolyte replacement as ordered   Monitor response to electrolyte replacements, including repeat lab results as appropriate     Problem: Metabolic/Fluid and Electrolytes - Adult  Goal: Hemodynamic stability and optimal renal function maintained  Outcome: Progressing  Flowsheets (Taken 8/12/2022 1532)  Hemodynamic stability and optimal renal function maintained:   Monitor labs and assess for signs and symptoms of volume excess or deficit   Monitor intake, output and patient weight   Encourage oral intake as appropriate   Instruct patient on fluid and nutrition restrictions as appropriate     Problem: Metabolic/Fluid and Electrolytes - Adult  Goal: Glucose maintained within prescribed range  Outcome: Progressing  Flowsheets (Taken 8/12/2022 1532)  Glucose maintained within prescribed range:   Monitor blood glucose as ordered   Assess for signs and symptoms of hyperglycemia and hypoglycemia   Administer ordered medications to maintain glucose within target range   Assess barriers to adequate nutritional intake and initiate nutrition consult as needed     Problem: Hematologic - Adult  Goal: Maintains hematologic stability  Outcome: Progressing  Flowsheets (Taken 8/12/2022 1532)  Maintains hematologic stability:   Assess for signs and symptoms of bleeding or hemorrhage   Administer blood products/factors as ordered   Monitor labs for bleeding or clotting disorders   Care plan reviewed with patient and family. Patient and Family verbalize understanding of the plan of care and contribute to goal setting.

## 2022-08-12 NOTE — PROGRESS NOTES
Hospitalist Progress Note    Patient:  Meek Domínguez    Unit/Bed:4K-13/013-A  YOB: 1960  MRN: [id number]   Acct: [de-identified]   PCP: Rishabh Harrington DO  Code Status: Full Code  Date of Admission: 8/8/2022      Assessment/Plan:    CAROLE on CKD IV: Creatinine bumped from baseline of 3-3.5 to now 6.5. 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 Patient was started on hemodialysis 8/10 due to refractory hyperkalemia. Planning to monitor creatinine over the weekend, if it continues to rise, will plan tunneled dialysis catheter placement on Monday. Continue to hold aspirin. Strict I/Os. Hyperkalemia: Secondary to CAROLE and KCl supplements. Patient was given IV insulin and calcium gluconate in the ED. Nephrology following, patient was treated with Anh Burrows, monitoring potassium q4 hours. Low potassium diet. Potassium remained elevated despite treatment. Temporary dialysis catheter was placed and dialysis initiated 8/10. Resolved. BMP daily. Chronic HFpEF: EF 55% on Echo 4/2022. No overt signs of decompensation. Diuretics are being held for above. Resume home carvedilol. Chronic normocytic anemia: In setting on CKD, workup also remarkable for LEROY. No signs of acute bleeding. Pt was started on IV Venofer and Retacrit per Nephrology. Hgb 6.8 today, pt will receive 1U PRBC. Will monitor H&H every 12 hours. If hemoglobin continues to decline after transfusion, consider GI consult. Patient follows with Dr. Trinity Ivan    COPD with chronic hypoxic respiratory failure: Baseline 4 L nasal cannula. Cont home inhalers.      Essential hypertension: BP stable, continue home meds    Hypothyroidism: on Synthroid    LAURIE: Home CPAP    Physical deconditioning: PT/OT    Obesity: BMI 43.4    Chronic back pain:cont home meds     Hx BPH/bladder spasms: home meds resumed        Chief Complaint: Abnormal lab    HPI / Hospital Course:   Initial HPI: \"63 y.o. male who presented to [facility] with abnormal labs. Patient went to regular scheduled weekly labs drawn and call back from nephrology service about abnormal labs and sent to ED [facility]. Patient complains fatigue which is worse than used to be. Denies chest pain heart palpitations dizziness lightheadedness fever chills productive cough abdominal pain dysuria. In ED, found potassium 7.0, BUN 72, creatinine 6.6, anion gap 16, GFR 9, magnesium 2.5, hemoglobin 8.6, hematocrit 28, MCV 85, troponin 0.0 35  EKG is negative for acute ischemic changes. And admitted for further evaluation and management\"     8/9: Patient's wife states that yesterday when he was going to have his labs drawn, he was extremely weak, unable to ambulate, difficulty finding his words. This resolved prior to arrival.  Patient currently is doing well. He reports shortness of breath and fatigue which are chronic and at baseline. Patient reports lower extremity edema slightly worse than his normal.  Patient denies fever/chills, chest pain, palpitations, lightheadedness. No focal neurodeficits. Nephrology following, started on [facility]. Monitoring potassium every 4 hours. 8/10: Patient is up in chair. He reports muscle twitching/spasms randomly. Patient is nervous about initiating dialysis. Patient denies fever/chills, chest pain, shortness of breath, abdominal pain, N/C/D. Patient states he is still having \"normal\" amount of urine output. 8/11: Patient seen following dialysis. He reports feeling extremely fatigued and weak after dialysis. He denies shortness of breath, chest pain, fever/chills, abdominal pain, N/V/D. Patient having adequate urine output. Potassium improving. Subjective (past 24 hours): Hgb 6.8 today. 1 unit PRBC was ordered and discussed with the patient for consent. Patient denies BRBPR, hematemesis, hemoptysis, hematuria.   Patient does report previous dark stools, states that he takes [place] lot\" of Pepto-Bismol at home. Patient states that his last bowel movement was 2 days ago, he does not remember if it was dark or not. Patient states that he follows with Dr. Lili Collier. States he has a history of a partial colectomy, denies issues with GI bleeding in the past.  Patient reports fatigue, weakness. He denies fever/chills, shortness of breath, chest pain, abdominal pain, N/V/D, urinary symptoms. ROS: Pertinent positives as noted in HPI. All other systems reviewed and negative. Past medical history, family history, social history and allergies reviewed again and is unchanged since admission. Medications:  Reviewed.   Infusion Medications    sodium chloride      sodium chloride 50 mL/hr at 08/11/22 1605    dextrose      sodium chloride       Scheduled Medications    iron sucrose  200 mg IntraVENous Daily    rOPINIRole  1 mg Oral Nightly    epoetin justin-epbx  2,000 Units SubCUTAneous Once per day on Mon Wed Fri    And    epoetin justin-epbx  3,000 Units SubCUTAneous Once per day on Mon Wed Fri    sodium chloride flush  5-40 mL IntraVENous 2 times per day    [Held by provider] enoxaparin  30 mg SubCUTAneous Q24H    [Held by provider] aspirin EC  81 mg Oral Daily    montelukast  10 mg Oral Nightly    mometasone-formoterol  2 puff Inhalation BID    rOPINIRole  0.5 mg Oral Daily    carvedilol  50 mg Oral BID WC    [Held by provider] tamsulosin  0.4 mg Oral Nightly    levothyroxine  175 mcg Oral Daily    oxybutynin  5 mg Oral Nightly     PRN Meds: sodium chloride, albuterol sulfate HFA, doxepin, glucose, dextrose bolus **OR** dextrose bolus, glucagon (rDNA), dextrose, sodium chloride flush, sodium chloride, ondansetron **OR** ondansetron, polyethylene glycol, acetaminophen **OR** acetaminophen, oxyCODONE-acetaminophen, nitroGLYCERIN, melatonin    I/O:     Intake/Output Summary (Last 24 hours) at 8/12/2022 0703  Last data filed at 8/12/2022 5871  Gross per 24 hour   Intake 699.91 ml   Output 1100 ml   Net -400.09 ml         Diet:  ADULT DIET; Regular; 4 carb choices (60 gm/meal); Low Sodium (2 gm); Low Potassium (Less than 3000 mg/day); Low Phosphorus (Less than 1000 mg); 1800 ml    Exam:  BP (!) 141/63   Pulse 69   Temp 99 °F (37.2 °C) (Oral)   Resp 16   Ht 6' 1\" (1.854 m)   Wt (!) 333 lb (151 kg)   SpO2 96%   BMI 43.93 kg/m²   General:  Chronically ill appearing male. NAD. HEENT:  normocephalic and atraumatic. No scleral icterus. PERR. Neck: supple. No JVD. No thyromegaly. Lungs: clear to auscultation. No retractions  Cardiac: RRR without murmur. Abdomen: soft. Nontender. Bowel sounds positive. Extremities:  No clubbing, cyanosis, or edema. 2+ BLE edema. Vasculature: capillary refill < 3 seconds. Palpable LE pulses bilaterally. Skin:  warm and dry. Psych:  Alert and oriented x3. Affect appropriate  Lymph:  No supraclavicular adenopathy. Neurologic:  No focal deficit. No seizures. Data: (All radiographs, tracings, PFTs, and imaging are personally viewed and interpreted unless otherwise noted)  Labs:   Recent Labs     08/10/22  0019 08/11/22  0456 08/12/22  0457   WBC 5.2 5.2 4.9   HGB 7.4* 7.1* 6.8*   HCT 24.8* 24.5* 23.0*    191 175       Recent Labs     08/10/22  0705 08/10/22  1638 08/11/22  0456 08/12/22  0457     --  136 136   K 6.7* 4.6 5.3* 4.4     --  101 98   CO2 22*  --  23 27   BUN 66*  --  43* 24*   CREATININE 6.5*  --  4.8* 4.0*   CALCIUM 9.5  --  9.0 8.6       No results for input(s): AST, ALT, BILIDIR, BILITOT, ALKPHOS in the last 72 hours. No results for input(s): INR in the last 72 hours. No results for input(s): Melissa Nicole in the last 72 hours.   Urinalysis:   Lab Results   Component Value Date/Time    NITRU NEGATIVE 03/23/2022 04:45 AM    WBCUA 5-9 03/23/2022 04:45 AM    BACTERIA NONE SEEN 03/23/2022 04:45 AM    RBCUA 25-50 03/23/2022 04:45 AM    BLOODU MODERATE 03/23/2022 recognition software. It may contain minor errors which are inherent in voice recognition technology. **      Final report electronically signed by Dr. Sara Ayala on 8/10/2022 11:24 AM      US RENAL COMPLETE   Final Result   1. No hydronephrosis. 2. Bilateral renal cysts. 3. Increased postvoid bladder residual and 65 mL. **This report has been created using voice recognition software. It may contain minor errors which are inherent in voice recognition technology. **      Final report electronically signed by Dr Rosalba Givens on 8/9/2022 4:06 PM        US RENAL COMPLETE    Result Date: 8/9/2022  PROCEDURE: US RENAL COMPLETE CLINICAL INFORMATION: Acute Kidney Injury COMPARISON: CT abdomen and pelvis 3/19/2022 TECHNIQUE: Grayscale and color sonographic imaging of the kidneys performed in longitudinal and transverse planes. FINDINGS: RIGHT KIDNEY - 12.9 x 6.2 x 5.9 cm Resistive Index - 0.61 Cortical Thickness - 1.3 cm LEFT KIDNEY - 13.7 x 8.1 x 7.2 cm Resistive Index - 0.61 Cortical Thickness - 1.8 cm URINARY BLADDER Pre-Void - 179.5 mL Post-Void - 65 mL No hydronephrosis. No calculus disease. No solid renal mass. A 4.2 x 4.1 x 3.8 cm cyst is seen in the midpole of the right kidney. A 1.4 cm cyst is seen in the superior pole of the right kidney. A 6.5 x 5.4 x 5.3 cm cyst is seen in the midpole of the left kidney. A 6.2 x 5.9 x 5.7 cm septated cyst is seen in the midpole of the left kidney. No bladder mass. The post void bladder residual is 65 mL. 1. No hydronephrosis. 2. Bilateral renal cysts. 3. Increased postvoid bladder residual and 65 mL. **This report has been created using voice recognition software. It may contain minor errors which are inherent in voice recognition technology. ** Final report electronically signed by Dr Rosalba Givens on 8/9/2022 4:06 PM        Tele:   [x] yes             [] no      Active Hospital Problems    Diagnosis Date Noted    ATN (acute tubular necrosis) ([facility] [N17.0] 08/10/2022     Priority: Medium    Hyperkalemia [E87.5] 08/08/2022     Priority: Medium    Acute renal failure superimposed on stage 5 chronic kidney disease, not on chronic dialysis [facility] [N17.9, N18.5]        Electronically signed by Jacki Chappell PA-C on 8/12/2022 at 7:03 AM

## 2022-08-12 NOTE — CARE COORDINATION
DISCHARGE/PLANNING EVALUATION  8/12/22, 9:58 AM EDT    Reason for Referral: Discharge planning  Mental Status: Alert and Oriented  Decision Making: Self   Family/Social/Home Environment: Patient lives with spouse Rosette Larson. Current Services including food security, transportation and housekeeping: Patient was independent prior to admission. Denied any current services in the home. Current Equipment:CPAP and O2  Payment Source: [employer]  Concerns or Barriers to Discharge: None  Post acute provider list with quality measures, geographic area and applicable managed care information provided. Questions regarding selection process answered: Offered    Teach Back Method used with patient regarding care plan and needs. Patient verbalize understanding of the plan of care and contribute to goal setting. Patient goals, treatment preferences and discharge plan: Patient plans to discharge home with spouse. He is open to [facility] but would like to wait closer to discharge to decide. Spouse Micheal to transport home at [place].      Electronically signed by BETH Ly on 8/12/2022 at 9:58 AM

## 2022-08-12 NOTE — PROGRESS NOTES
[facility] [address] [address]  PHYSICAL THERAPY MISSED TREATMENT NOTE  [facility] ICU STEPDOWN TELEMETRY 4K    Date: 2022  Patient Name: Chandrika Fatima        MRN: [id number]   : 1960  (63 y.o.)  Gender: male                REASON FOR MISSED TREATMENT:  Missed Treat. Per chart review, hemoglobin this morning 6.8, has orders for blood transfusion, will hold.

## 2022-08-12 NOTE — CARE COORDINATION
Collaborative Discharge Planning    Reyes Lilly  :  1960  MRN:  [id number]    ADMIT DATE:  2022      Discharge Planning Discharge Planning  Type of Residence: House  Living Arrangements: Spouse/Significant Other, Children  Support Systems: Spouse/Significant Other  Current Services Prior To Admission: C-pap, Oxygen Therapy  Potential Assistance Needed: Durable Medical Equipment  DME: Cane, Oxygen Therapy (Comment)  DME Ordered?: Oxygen therapy (comment)  Potential Assistance Purchasing Medications: No  Type of Home Care Services: None  Patient expects to be discharged to[de-identified] House  History of falls?: Yes  White Board Notes /Social Work Whiteboard Notes  /Social Work Whiteboard: ; SW/CM; Possible HH at [facility]. plans home w spouse Jessica Fair, has [facility] home oxygen 2-6L, BIPAP oxygen 1L bleed-in; current CHF Clinic    Discharge Plan Home with home health  plans home w spouse Jessica Fair (who had a stroke); prefers [facility] after choices offered (nsg, therapy); has nebulizer, [facility] home oxygen 2-6L, BIPAP 1L bleed in at night; current Dr. Lakeisha Andrews; therapy following; monitor possible HD needs; has cane, walker    Discharge Milestones and Delays: Clinical status    CAROLE, Hyperkalemia/Fall x2  History: CHF    8/10 Non-Tunneled HD Catheter (intermittent HD)     Creatinine 4 (baseline 3-3.5);  Urine Output = 70 ml/24h  H 6.8; plans PRBC transfusion, IV Iron    Oxygen 2L (baseline) versus CPAP    SIGNED:  Aleksey Wood RN   2022, 12:26 PM

## 2022-08-12 NOTE — FLOWSHEET NOTE
08/12/22 1042   Safe Environment   Safety Measures   (virtual safety rounds complete)   Virtual RN rounds, conversation noted in room, did not interrupt. Camera not turned on.

## 2022-08-12 NOTE — PROGRESS NOTES
[facility]   Nephrology progress note  8/12/2022, 10:19 AM      Pt Name:    Jerri Avila  MRN:     [id number]     YOB: 1960  Admit Date:    8/8/2022  6:24 PM    Chief Complaint: Nephrology following for CAROLE/CKD. Subjective:  Patient seen and examined  Seen and examined earlier today  Denies any chest pain or any shortness of breath  Patient reports he is still making urine      Objective:  24HR INTAKE/OUTPUT:    Intake/Output Summary (Last 24 hours) at 8/12/2022 1019  Last data filed at 8/12/2022 0840  Gross per 24 hour   Intake 700.91 ml   Output 1475 ml   Net -774.09 ml           I/O last 3 completed shifts:   In: 819.9 [P.O.:320; IV Piggyback:99.9]  Out: 2350 [BFJBW:7433]  I/O this shift:  In: 1 [I.V.:1]  Out: 375 [Urine:375]     Admission weight: (!) 329 lb (149.2 kg)  Wt Readings from Last 3 Encounters:   08/12/22 (!) 333 lb (151 kg)   08/04/22 (!) 329 lb (149.2 kg)   07/25/22 (!) 329 lb (149.2 kg)        Vitals :   Vitals:    08/12/22 0420 08/12/22 0502 08/12/22 0742 08/12/22 0943   BP:   130/77 127/73   Pulse:   65 66   Resp: 16  18 20   Temp:   98.4 °F (36.9 °C) 98.2 °F (36.8 °C)   TempSrc:   Oral Oral   SpO2:  96% 95% 97%   Weight:       Height:           Physical examination  General Appearance: alert and cooperative with exam, appears comfortable, no distress  Mouth/Throat: Oral mucosa moist  Neck: No JVD  Lungs: no use of accessory muscles  GI: soft, non-tender, no guarding, + obese  Extremities: No pitting LE edema    Medications:  Infusion:    sodium chloride      sodium chloride Stopped (08/11/22 2118)    dextrose      sodium chloride       Meds:    iron sucrose  200 mg IntraVENous Daily    rOPINIRole  1 mg Oral Nightly    epoetin justin-epbx  2,000 Units SubCUTAneous Once per day on Mon Wed Fri    And    epoetin justin-epbx  3,000 Units SubCUTAneous Once per day on Mon Wed Fri    sodium chloride flush  5-40 mL IntraVENous 2 times per day    [Held by provider] enoxaparin  30 mg SubCUTAneous Q24H    [Held by provider] aspirin EC  81 mg Oral Daily    montelukast  10 mg Oral Nightly    mometasone-formoterol  2 puff Inhalation BID    rOPINIRole  0.5 mg Oral Daily    carvedilol  50 mg Oral BID WC    [Held by provider] tamsulosin  0.4 mg Oral Nightly    levothyroxine  175 mcg Oral Daily    oxybutynin  5 mg Oral Nightly     Meds prn: sodium chloride, albuterol sulfate HFA, doxepin, glucose, dextrose bolus **OR** dextrose bolus, glucagon (rDNA), dextrose, sodium chloride flush, sodium chloride, ondansetron **OR** ondansetron, polyethylene glycol, acetaminophen **OR** acetaminophen, oxyCODONE-acetaminophen, nitroGLYCERIN, melatonin     Lab Data :  CBC:   Recent Labs     08/10/22  0019 08/11/22  0456 08/12/22  0457   WBC 5.2 5.2 4.9   HGB 7.4* 7.1* 6.8*   HCT 24.8* 24.5* 23.0*    191 175       CMP:  Recent Labs     08/10/22  0705 08/10/22  1638 08/11/22  0456 08/12/22  0457     --  136 136   K 6.7* 4.6 5.3* 4.4     --  101 98   CO2 22*  --  23 27   BUN 66*  --  43* 24*   CREATININE 6.5*  --  4.8* 4.0*   GLUCOSE 89  --  97 84   CALCIUM 9.5  --  9.0 8.6       Hepatic:   No results for input(s): LABALBU, AST, ALT, ALB, BILITOT, ALKPHOS in the last 72 hours. Assessment and Plan:  1. 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 Started patient on dialysis due to refractory hyperkalemia  Had hemodialysis treatment #2 yesterday  Check labs in a.m. No acute need for dialysis today  Will monitor interdialytic creatinine  If creatinine continues to rise then we will plan tunneled dialysis catheter placement on Monday  Continue to hold aspirin  Thought process reviewed with patient in detail  Indications for dialysis, long-term prognosis etc. was discussed in detail    2. Hyperkalemia, refractory. Improved with dialysis  3. Diabetic nephropathy with proteinuria  4. Hypertension  5.   Anemia in CKD.  Iron saturation 12%. On iron and ISAAC  6. Obesity  7. Sleep apnea    D/W patient     Yonny Velásquez MD  [facility]    This report has been created using voice recognition software.  It may contain minor errors which are inherent in voice recognition technology

## 2022-08-12 NOTE — PROGRESS NOTES
Sent message to IV team, asking for them to bring ultra sound to help get an IV access, patient has orders for blood and IV fluids.

## 2022-08-12 NOTE — PLAN OF CARE
Problem: Discharge Planning  Goal: Discharge to home or other facility with appropriate resources  8/11/2022 2248 by Lainey Pedraza RN  Outcome: Progressing  Flowsheets (Taken 8/11/2022 2025)  Discharge to home or other facility with appropriate resources: Identify barriers to discharge with patient and caregiver     Problem: Pain  Goal: Verbalizes/displays adequate comfort level or baseline comfort level  8/11/2022 2248 by Lainey Pedraza RN  Outcome: Progressing  Flowsheets (Taken 8/11/2022 1655 by Rachel Ríos RN)  Verbalizes/displays adequate comfort level or baseline comfort level:   Encourage patient to monitor pain and request assistance   Assess pain using appropriate pain scale     Problem: Chronic Conditions and Co-morbidities  Goal: Patient's chronic conditions and co-morbidity symptoms are monitored and maintained or improved  8/11/2022 2248 by Lainey Pedraza RN  Outcome: Progressing  Flowsheets (Taken 8/11/2022 2025)  Care Plan - Patient's Chronic Conditions and Co-Morbidity Symptoms are Monitored and Maintained or Improved: Monitor and assess patient's chronic conditions and comorbid symptoms for stability, deterioration, or improvement     Problem: Safety - Adult  Goal: Free from fall injury  8/11/2022 2248 by Lainey Pedraza RN  Outcome: Progressing  Flowsheets (Taken 8/11/2022 1655 by Rachel Ríos RN)  Free From Fall Injury: Instruct family/caregiver on patient safety     Problem: ABCDS Injury Assessment  Goal: Absence of physical injury  8/11/2022 2248 by Lainey Pedraza RN  Outcome: Progressing  Flowsheets (Taken 8/11/2022 1655 by Rachel Ríos RN)  Absence of Physical Injury: Implement safety measures based on patient assessment     Problem: Skin/Tissue Integrity  Goal: Absence of new skin breakdown  Description: 1. Monitor for areas of redness and/or skin breakdown  2. Assess vascular access sites hourly  3.   Every 4-6 hours minimum:  Change oxygen saturation probe site  4. Every 4-6 hours:  If on nasal continuous positive airway pressure, respiratory therapy assess nares and determine need for appliance change or resting period. 8/11/2022 3378 by Garret Saini RN  Outcome: Progressing         Care plan reviewed with patient. Patient verbalize understanding of the plan of care and contribute to goal setting.

## 2022-08-12 NOTE — PROGRESS NOTES
[address] THERAPY MISSED TREATMENT NOTE  [facility] ICU STEPDOWN TELEMETRY 4K  4K-13/013-A      Date: 2022  Patient Name: Sukhdeep Haywood        CSN: [id number]   : 1960  (63 y.o.)  Gender: male   Referring Practitioner: Kyle Miner PA-C  Diagnosis: hypokalemia         REASON FOR MISSED TREATMENT: Hold Treatment per Nursing Patient had low hemoglobin, will attempt back as time allows.

## 2022-08-12 NOTE — PLAN OF CARE
Problem: Discharge Planning  Goal: Discharge to home or other facility with appropriate resources  8/12/2022 1000 by BETH Campbell  Outcome: Progressing  SW consult received and completed. See SW note.

## 2022-08-12 NOTE — CONSENT
Informed Consent for Blood Component Transfusion Note    I have discussed with the patient the rationale for blood component transfusion; its benefits in treating or preventing fatigue, organ damage, or death; and its risk which includes mild transfusion reactions, rare risk of blood borne infection, or more serious but rare reactions. I have discussed the alternatives to transfusion, including the risk and consequences of not receiving transfusion. The patient had an opportunity to ask questions and had agreed to proceed with transfusion of blood components.     Electronically signed by Kat Pittman PA-C on 8/12/22 at 9:35 AM EDT

## 2022-08-12 NOTE — CARE COORDINATION
08/12/22 1044   Readmission Assessment   Number of Days since last admission? 8-30 days   Previous Disposition Home with Family   Who is being Interviewed Patient   What was the patient's/caregiver's perception as to why they think they needed to return back to the hospital? Other (Comment)  (hi K+, hi kidney levels)   Did you visit your Primary Care Physician after you left the hospital, before you returned this time? No  (readmitted too soon)   Did you see a specialist, such as Cardiac, Pulmonary, Orthopedic Physician, etc. after you left the hospital? Yes   Who advised the patient to return to the hospital? Physician   Does the patient report anything that got in the way of taking their medications? No   In our efforts to provide the best possible care to you and others like you, can you think of anything that we could have done to help you after you left the hospital the first time, so that you might not have needed to return so soon?  Other (Comment)  (no)

## 2022-08-13 LAB
ANION GAP SERPL CALCULATED.3IONS-SCNC: 11 MEQ/L (ref 8–16)
BUN BLDV-MCNC: 31 MG/DL (ref 7–22)
CALCIUM SERPL-MCNC: 8.8 MG/DL (ref 8.5–10.5)
CHLORIDE BLD-SCNC: 100 MEQ/L (ref 98–111)
CO2: 25 MEQ/L (ref 23–33)
CREAT SERPL-MCNC: 4.9 MG/DL (ref 0.4–1.2)
ERYTHROCYTE [DISTWIDTH] IN BLOOD BY AUTOMATED COUNT: 17 % (ref 11.5–14.5)
ERYTHROCYTE [DISTWIDTH] IN BLOOD BY AUTOMATED COUNT: 52.2 FL (ref 35–45)
GFR SERPL CREATININE-BSD FRML MDRD: 12 ML/MIN/1.73M2
GLUCOSE BLD-MCNC: 77 MG/DL (ref 70–108)
GLUCOSE BLD-MCNC: 81 MG/DL (ref 70–108)
GLUCOSE BLD-MCNC: 89 MG/DL (ref 70–108)
GLUCOSE BLD-MCNC: 91 MG/DL (ref 70–108)
HCT VFR BLD CALC: 24.2 % (ref 42–52)
HCT VFR BLD CALC: 24.4 % (ref 42–52)
HEMOGLOBIN: 7.5 GM/DL (ref 14–18)
HEMOGLOBIN: 7.5 GM/DL (ref 14–18)
MCH RBC QN AUTO: 26.2 PG (ref 26–33)
MCHC RBC AUTO-ENTMCNC: 30.7 GM/DL (ref 32.2–35.5)
MCV RBC AUTO: 85.3 FL (ref 80–94)
PLATELET # BLD: 184 THOU/MM3 (ref 130–400)
PMV BLD AUTO: 8.6 FL (ref 9.4–12.4)
POTASSIUM SERPL-SCNC: 4.2 MEQ/L (ref 3.5–5.2)
RBC # BLD: 2.86 MILL/MM3 (ref 4.7–6.1)
SODIUM BLD-SCNC: 136 MEQ/L (ref 135–145)
WBC # BLD: 4.9 THOU/MM3 (ref 4.8–10.8)

## 2022-08-13 PROCEDURE — 1200000003 HC TELEMETRY R&B

## 2022-08-13 PROCEDURE — 6370000000 HC RX 637 (ALT 250 FOR IP)

## 2022-08-13 PROCEDURE — 82948 REAGENT STRIP/BLOOD GLUCOSE: CPT

## 2022-08-13 PROCEDURE — 80048 BASIC METABOLIC PNL TOTAL CA: CPT

## 2022-08-13 PROCEDURE — 94640 AIRWAY INHALATION TREATMENT: CPT

## 2022-08-13 PROCEDURE — 2580000003 HC RX 258

## 2022-08-13 PROCEDURE — 90935 HEMODIALYSIS ONE EVALUATION: CPT

## 2022-08-13 PROCEDURE — 85027 COMPLETE CBC AUTOMATED: CPT

## 2022-08-13 PROCEDURE — 85014 HEMATOCRIT: CPT

## 2022-08-13 PROCEDURE — 36415 COLL VENOUS BLD VENIPUNCTURE: CPT

## 2022-08-13 PROCEDURE — 94761 N-INVAS EAR/PLS OXIMETRY MLT: CPT

## 2022-08-13 PROCEDURE — 99232 SBSQ HOSP IP/OBS MODERATE 35: CPT | Performed by: INTERNAL MEDICINE

## 2022-08-13 PROCEDURE — 2580000003 HC RX 258: Performed by: INTERNAL MEDICINE

## 2022-08-13 PROCEDURE — 85018 HEMOGLOBIN: CPT

## 2022-08-13 PROCEDURE — 6360000002 HC RX W HCPCS: Performed by: INTERNAL MEDICINE

## 2022-08-13 PROCEDURE — 2700000000 HC OXYGEN THERAPY PER DAY

## 2022-08-13 RX ORDER — GABAPENTIN 300 MG/1
300 CAPSULE ORAL NIGHTLY
Status: DISCONTINUED | OUTPATIENT
Start: 2022-08-13 | End: 2022-08-17 | Stop reason: HOSPADM

## 2022-08-13 RX ORDER — HYDRALAZINE HYDROCHLORIDE 25 MG/1
25 TABLET, FILM COATED ORAL EVERY 8 HOURS SCHEDULED
Status: DISCONTINUED | OUTPATIENT
Start: 2022-08-13 | End: 2022-08-16

## 2022-08-13 RX ORDER — DOXEPIN HYDROCHLORIDE 50 MG/1
50 CAPSULE ORAL NIGHTLY
Status: DISCONTINUED | OUTPATIENT
Start: 2022-08-13 | End: 2022-08-17 | Stop reason: HOSPADM

## 2022-08-13 RX ORDER — AMITRIPTYLINE HYDROCHLORIDE 10 MG/1
10 TABLET, FILM COATED ORAL 2 TIMES DAILY
Status: DISCONTINUED | OUTPATIENT
Start: 2022-08-13 | End: 2022-08-17 | Stop reason: HOSPADM

## 2022-08-13 RX ORDER — INSULIN LISPRO 100 [IU]/ML
0-4 INJECTION, SOLUTION INTRAVENOUS; SUBCUTANEOUS NIGHTLY
Status: DISCONTINUED | OUTPATIENT
Start: 2022-08-13 | End: 2022-08-14

## 2022-08-13 RX ORDER — INSULIN LISPRO 100 [IU]/ML
0-4 INJECTION, SOLUTION INTRAVENOUS; SUBCUTANEOUS
Status: DISCONTINUED | OUTPATIENT
Start: 2022-08-13 | End: 2022-08-14

## 2022-08-13 RX ADMIN — HYDRALAZINE HYDROCHLORIDE 25 MG: 25 TABLET, FILM COATED ORAL at 21:48

## 2022-08-13 RX ADMIN — IRON SUCROSE 200 MG: 20 INJECTION, SOLUTION INTRAVENOUS at 09:19

## 2022-08-13 RX ADMIN — DOXEPIN HYDROCHLORIDE 50 MG: 50 CAPSULE ORAL at 21:47

## 2022-08-13 RX ADMIN — CARVEDILOL 50 MG: 25 TABLET, FILM COATED ORAL at 09:15

## 2022-08-13 RX ADMIN — AMITRIPTYLINE HYDROCHLORIDE 10 MG: 10 TABLET, FILM COATED ORAL at 21:47

## 2022-08-13 RX ADMIN — SODIUM CHLORIDE, PRESERVATIVE FREE 10 ML: 5 INJECTION INTRAVENOUS at 09:15

## 2022-08-13 RX ADMIN — SODIUM CHLORIDE, PRESERVATIVE FREE 10 ML: 5 INJECTION INTRAVENOUS at 19:52

## 2022-08-13 RX ADMIN — MOMETASONE FUROATE AND FORMOTEROL FUMARATE DIHYDRATE 2 PUFF: 100; 5 AEROSOL RESPIRATORY (INHALATION) at 07:41

## 2022-08-13 RX ADMIN — Medication 4.5 MG: at 22:09

## 2022-08-13 RX ADMIN — OXYCODONE AND ACETAMINOPHEN 1 TABLET: 10; 325 TABLET ORAL at 16:13

## 2022-08-13 RX ADMIN — OXYBUTYNIN CHLORIDE 5 MG: 5 TABLET, EXTENDED RELEASE ORAL at 21:48

## 2022-08-13 RX ADMIN — LEVOTHYROXINE SODIUM 175 MCG: 0.03 TABLET ORAL at 05:57

## 2022-08-13 RX ADMIN — MONTELUKAST SODIUM 10 MG: 10 TABLET ORAL at 23:17

## 2022-08-13 RX ADMIN — CARVEDILOL 50 MG: 25 TABLET, FILM COATED ORAL at 19:50

## 2022-08-13 RX ADMIN — GABAPENTIN 300 MG: 300 CAPSULE ORAL at 21:47

## 2022-08-13 RX ADMIN — ROPINIROLE HYDROCHLORIDE 1 MG: 1 TABLET, FILM COATED ORAL at 21:47

## 2022-08-13 RX ADMIN — SODIUM CHLORIDE: 9 INJECTION, SOLUTION INTRAVENOUS at 09:17

## 2022-08-13 ASSESSMENT — PAIN DESCRIPTION - PAIN TYPE
TYPE: CHRONIC PAIN

## 2022-08-13 ASSESSMENT — PAIN DESCRIPTION - LOCATION
LOCATION: BACK

## 2022-08-13 ASSESSMENT — PAIN DESCRIPTION - FREQUENCY
FREQUENCY: CONTINUOUS

## 2022-08-13 ASSESSMENT — PAIN DESCRIPTION - DESCRIPTORS
DESCRIPTORS: ACHING
DESCRIPTORS: ACHING
DESCRIPTORS: ACHING;DISCOMFORT
DESCRIPTORS: ACHING

## 2022-08-13 ASSESSMENT — PAIN DESCRIPTION - ORIENTATION
ORIENTATION: LOWER

## 2022-08-13 ASSESSMENT — PAIN - FUNCTIONAL ASSESSMENT
PAIN_FUNCTIONAL_ASSESSMENT: ACTIVITIES ARE NOT PREVENTED

## 2022-08-13 ASSESSMENT — PAIN SCALES - GENERAL
PAINLEVEL_OUTOF10: 9
PAINLEVEL_OUTOF10: 8
PAINLEVEL_OUTOF10: 6
PAINLEVEL_OUTOF10: 7
PAINLEVEL_OUTOF10: 6

## 2022-08-13 ASSESSMENT — PAIN DESCRIPTION - ONSET
ONSET: ON-GOING

## 2022-08-13 NOTE — FLOWSHEET NOTE
08/13/22 1409   Safe Environment   Safety Measures Other (comment)  (virtual safety round complete)     Virtual RN rounds, pt out of room at this time

## 2022-08-13 NOTE — PROGRESS NOTES
ondansetron **OR** ondansetron, polyethylene glycol, acetaminophen **OR** acetaminophen, oxyCODONE-acetaminophen, nitroGLYCERIN, melatonin        Intake/Output Summary (Last 24 hours) at 8/13/2022 1916  Last data filed at 8/13/2022 1459  Gross per 24 hour   Intake 2116.49 ml   Output 2775 ml   Net -658.51 ml       Exam:  BP (!) 151/85   Pulse 72   Temp 98.4 °F (36.9 °C) (Oral)   Resp 18   Ht 6' 1\" (1.854 m)   Wt (!) 313 lb 15 oz (142.4 kg)   SpO2 99%   BMI 41.42 kg/m²     General: No distress, appears stated age. Chronically ill-appearing  Eyes:  PERRL. Conjunctivae/corneas clear. HENT: Head normal appearing. Nares normal. Oral mucosa moist.  Hearing intact. Neck: Supple, with full range of motion. Trachea midline. No gross JVD appreciated. Respiratory:  Normal effort. Clear to auscultation, without rales or wheezes or rhonchi. Cardiovascular: Normal rate, regular rhythm with normal S1/S2 without murmurs. Abdomen: Soft, non-tender, non-distended with normal bowel sounds. Musculoskeletal: No joint swelling or tenderness. Normal tone. No abnormal movements. Skin: Warm and dry. No rashes or lesions. Trace BLE edema +1. Neurologic:  No focal sensory/motor deficits in the upper or lower extremities. Psychiatric: Alert and oriented, normal insight and thought content. Capillary Refill: Brisk,< 3 seconds. Peripheral Pulses: +2 palpable, equal bilaterally. Labs:   Recent Labs     08/11/22 0456 08/12/22 0457 08/12/22  1401 08/13/22  0429 08/13/22  1659   WBC 5.2 4.9  --  4.9  --    HGB 7.1* 6.8* 7.6* 7.5* 7.5*   HCT 24.5* 23.0* 24.8* 24.4* 24.2*    175  --  184  --      Recent Labs     08/11/22  0456 08/12/22  0457 08/13/22  0429    136 136   K 5.3* 4.4 4.2    98 100   CO2 23 27 25   BUN 43* 24* 31*   CREATININE 4.8* 4.0* 4.9*   CALCIUM 9.0 8.6 8.8     No results for input(s): AST, ALT, BILIDIR, BILITOT, ALKPHOS in the last 72 hours.   No results for input(s): INR in the last 72 hours. No results for input(s): Patrisha Meigs in the last 72 hours. No results for input(s): PROCAL in the last 72 hours. Lab Results   Component Value Date/Time    NITRU NEGATIVE 03/23/2022 04:45 AM    WBCUA 5-9 03/23/2022 04:45 AM    BACTERIA NONE SEEN 03/23/2022 04:45 AM    RBCUA 25-50 03/23/2022 04:45 AM    BLOODU MODERATE 03/23/2022 04:45 AM    SPECGRAV 1.015 03/23/2022 04:45 AM    GLUCOSEU 500 04/13/2021 08:46 AM       Radiology (48 hours):  No results found. DVT prophylaxis:    [x] Lovenox  [] SCDs  [] SQ Heparin  [] Encourage ambulation   [] Already on Anticoagulation       Diet: ADULT DIET; Regular; 4 carb choices (60 gm/meal); Low Sodium (2 gm); Low Potassium (Less than 3000 mg/day);  Low Phosphorus (Less than 1000 mg); 1800 ml  Code Status: Full Code  PT/OT: yes  Tele: yes  IVF: NA    Electronically signed by Deirdre Lin DO on 8/13/2022 at 7:16 PM    Case was discussed with Attending, Dr. Melania Bella MD

## 2022-08-13 NOTE — FLOWSHEET NOTE
Stable 3 hour TX. Removed 1 L of fluid as ordered. pt tolerated fluid removal well. Bilateral cath ports flushed with normal saline and clamped. CVC drsg clean, dry, and intact. Report called to primary RN. TX record printed for scanning into EMR.   08/13/22 1148 08/13/22 1459   Vital Signs   BP (!) 148/81 (!) 153/87   Temp 98.3 °F (36.8 °C) 98.1 °F (36.7 °C)   Heart Rate 63 60   Resp 15 19   SpO2 99 % 98 %   Weight (!) 316 lb 4.8 oz (143.5 kg) (!) 313 lb 15 oz (142.4 kg)   Weight Method Bed scale  --    Post-Hemodialysis Assessment   Post-Treatment Procedures  --  Blood returned;Catheter Capped, clamped with Saline x2 ports   Machine Disinfection Process  --  Acid/Vinegar Clean;Heat Disinfect; Exterior Machine Disinfection   Blood Volume Processed (Liters)  --  47.5 l/min   Dialyzer Clearance  --  Lightly streaked   Duration of Treatment (minutes)  --  180 minutes   Heparin Amount Administered During Treatment (mL)  --  0 mL   Hemodialysis Intake (ml)  --  400 ml   Hemodialysis Output (ml)  --  1400 ml   NET Removed (ml)  --  1000   Tolerated Treatment  --  Good

## 2022-08-13 NOTE — PLAN OF CARE
Problem: Discharge Planning  Goal: Discharge to home or other facility with appropriate resources  8/13/2022 0924 by Luz Elena Marin RN  Outcome: Progressing  Flowsheets (Taken 8/13/2022 6730)  Discharge to home or other facility with appropriate resources:   Identify barriers to discharge with patient and caregiver   Arrange for needed discharge resources and transportation as appropriate   Identify discharge learning needs (meds, wound care, etc)   Arrange for interpreters to assist at discharge as needed  8/13/2022 0226 by Rhys Vu RN  Outcome: Progressing  [address] (Taken 8/12/2022 2024)  Discharge to home or other facility with appropriate resources: Identify barriers to discharge with patient and caregiver     Problem: Pain  Goal: Verbalizes/displays adequate comfort level or baseline comfort level  8/13/2022 0924 by Luz Elena Marin RN  Outcome: Progressing  Flowsheets (Taken 8/12/2022 1532 by Lyndsey Whelan RN)  Verbalizes/displays adequate comfort level or baseline comfort level:   Encourage patient to monitor pain and request assistance   Assess pain using appropriate pain scale   Implement non-pharmacological measures as appropriate and evaluate response  8/13/2022 0226 by Rhys Vu RN  Outcome: Progressing  Flowsheets (Taken 8/12/2022 1532 by Lyndsey Whelan RN)  Verbalizes/displays adequate comfort level or baseline comfort level:   Encourage patient to monitor pain and request assistance   Assess pain using appropriate pain scale   Implement non-pharmacological measures as appropriate and evaluate response     Problem: Chronic Conditions and Co-morbidities  Goal: Patient's chronic conditions and co-morbidity symptoms are monitored and maintained or improved  8/13/2022 0924 by Luz Elnea Marin RN  Outcome: Progressing  Flowsheets (Taken 8/13/2022 0921)  Care Plan - Patient's Chronic Conditions and Co-Morbidity Symptoms are Monitored and Maintained or Improved:   Monitor and assess patient's chronic conditions and comorbid symptoms for stability, deterioration, or improvement   Update acute care plan with appropriate goals if chronic or comorbid symptoms are exacerbated and prevent overall improvement and discharge   Collaborate with multidisciplinary team to address chronic and comorbid conditions and prevent exacerbation or deterioration  8/13/2022 0226 by Afsaneh Funez RN  Outcome: Progressing  Flowsheets (Taken 8/12/2022 2024)  Care Plan - Patient's Chronic Conditions and Co-Morbidity Symptoms are Monitored and Maintained or Improved: Monitor and assess patient's chronic conditions and comorbid symptoms for stability, deterioration, or improvement     Problem: Safety - Adult  Goal: Free from fall injury  8/13/2022 0924 by Maxwell Marie RN  Outcome: Progressing  Flowsheets (Taken 8/13/2022 0743)  Free From Fall Injury:   Instruct family/caregiver on patient safety   Based on caregiver fall risk screen, instruct family/caregiver to ask for assistance with transferring infant if caregiver noted to have fall risk factors  8/13/2022 0226 by Afsaneh Funez RN  Outcome: Progressing  [address] (Taken 8/12/2022 1532 by Ry Neely RN)  Free From Fall Injury:   Instruct family/caregiver on patient safety   Based on caregiver fall risk screen, instruct family/caregiver to ask for assistance with transferring infant if caregiver noted to have fall risk factors     Problem: ABCDS Injury Assessment  Goal: Absence of physical injury  8/13/2022 0924 by Maxwell Marie RN  Outcome: Progressing  Flowsheets (Taken 8/13/2022 0743)  Absence of Physical Injury: Implement safety measures based on patient assessment  8/13/2022 0226 by Afsaneh Funez RN  Outcome: Progressing  Flowsheets (Taken 8/11/2022 1655 by Claudean Rumple, RN)  Absence of Physical Injury: Implement safety measures based on patient assessment     Problem: Skin/Tissue Integrity  Goal: Absence of new skin breakdown  Description: 1. Monitor for areas of redness and/or skin breakdown  2. Assess vascular access sites hourly  3. Every 4-6 hours minimum:  Change oxygen saturation probe site  4. Every 4-6 hours:  If on nasal continuous positive airway pressure, respiratory therapy assess nares and determine need for appliance change or resting period.   8/13/2022 0924 by Bárbara Eubanks RN  Outcome: Progressing  8/13/2022 0226 by Lisa Abel RN  Outcome: Progressing  Note: No new skin breakdown this shift       Problem: Respiratory - Adult  Goal: Clear lung sounds  8/13/2022 0745 by Nilton Casarez RCP  Outcome: Progressing  Goal: Achieves optimal ventilation and oxygenation  8/13/2022 0924 by Bárbara Eubanks RN  Outcome: Progressing  Flowsheets (Taken 8/13/2022 0921)  Achieves optimal ventilation and oxygenation:   Assess for changes in respiratory status   Assess for changes in mentation and behavior   Position to facilitate oxygenation and minimize respiratory effort  8/13/2022 0226 by Lisa Abel RN  Outcome: Progressing  Flowsheets (Taken 8/12/2022 2024)  Achieves optimal ventilation and oxygenation:   Assess for changes in respiratory status   Assess for changes in mentation and behavior     Problem: Cardiovascular - Adult  Goal: Maintains optimal cardiac output and hemodynamic stability  8/13/2022 0924 by Bárbara Eubanks RN  Outcome: Progressing  Flowsheets (Taken 8/13/2022 7252)  Maintains optimal cardiac output and hemodynamic stability:   Monitor blood pressure and heart rate   Monitor urine output and notify Licensed Independent Practitioner for values outside of normal range   Assess for signs of decreased cardiac output  8/13/2022 0226 by Lisa Abel RN  Outcome: Progressing  Flowsheets (Taken 8/12/2022 2024)  Maintains optimal cardiac output and hemodynamic stability:   Monitor blood pressure and heart rate   Assess for signs of decreased cardiac output  Goal: Absence of cardiac dysrhythmias or at baseline  8/13/2022 0924 by Delford Soni, RN  Outcome: Progressing  Flowsheets (Taken 8/13/2022 6111)  Absence of cardiac dysrhythmias or at baseline:   Monitor cardiac rate and rhythm   Assess for signs of decreased cardiac output   Administer antiarrhythmia medication and electrolyte replacement as ordered  8/13/2022 0226 by Britni Rodriguez RN  Outcome: Progressing  Flowsheets (Taken 8/12/2022 2024)  Absence of cardiac dysrhythmias or at baseline: Monitor cardiac rate and rhythm     Problem: Skin/Tissue Integrity - Adult  Goal: Skin integrity remains intact  8/13/2022 0924 by Edith Soni RN  Outcome: Progressing  Flowsheets  Taken 8/13/2022 0921  Skin Integrity Remains Intact:   Monitor for areas of redness and/or skin breakdown   Assess vascular access sites hourly  Taken 8/13/2022 0744  Skin Integrity Remains Intact: Monitor for areas of redness and/or skin breakdown  8/13/2022 0226 by Britni Rodriguez RN  Outcome: Progressing  Flowsheets  Taken 8/13/2022 0223  Skin Integrity Remains Intact: Monitor for areas of redness and/or skin breakdown  Taken 8/12/2022 2024  Skin Integrity Remains Intact: Monitor for areas of redness and/or skin breakdown  Goal: Incisions, wounds, or drain sites healing without S/S of infection  8/13/2022 0924 by Edith Soni RN  Outcome: Progressing  Flowsheets  Taken 8/13/2022 0921  Incisions, Wounds, or Drain Sites Healing Without Sign and Symptoms of Infection: ADMISSION and DAILY: Assess and document risk factors for pressure ulcer development  Taken 8/13/2022 0744  Incisions, Wounds, or Drain Sites Healing Without Sign and Symptoms of Infection:   ADMISSION and DAILY: Assess and document risk factors for pressure ulcer development   TWICE DAILY: Assess and document skin integrity  8/13/2022 0226 by Britni Rodriguez RN  Outcome: Progressing  Flowsheets (Taken 8/13/2022 0226)  Incisions, Wounds, or Drain Sites Healing Without Sign and Symptoms of Infection: ADMISSION and DAILY: Assess and document risk factors for pressure ulcer development  Goal: Oral mucous membranes remain intact  8/13/2022 0924 by Zoraida Park RN  Outcome: Progressing  Flowsheets  Taken 8/13/2022 0921  Oral Mucous Membranes Remain Intact:   Assess oral mucosa and hygiene practices   Implement preventative oral hygiene regimen   Implement oral medicated treatments as ordered  Taken 8/13/2022 0744  Oral Mucous Membranes Remain Intact:   Assess oral mucosa and hygiene practices   Implement oral medicated treatments as ordered   Implement preventative oral hygiene regimen  8/13/2022 0226 by Elizabeth Miranda RN  Outcome: Progressing  Flowsheets (Taken 8/12/2022 2024)  Oral Mucous Membranes Remain Intact: Assess oral mucosa and hygiene practices     Problem: Musculoskeletal - Adult  Goal: Return mobility to safest level of function  8/13/2022 0924 by Zoraida Park RN  Outcome: Progressing  Flowsheets (Taken 8/13/2022 4122)  Return Mobility to Safest Level of Function:   Assess patient stability and activity tolerance for standing, transferring and ambulating with or without assistive devices   Assist with transfers and ambulation using safe patient handling equipment as needed   Ensure adequate protection for wounds/incisions during mobilization   Obtain physical therapy/occupational therapy consults as needed   Instruct patient/family in ordered activity level  8/13/2022 0226 by Elizabeth Miranda RN  Outcome: Progressing  Flowsheets (Taken 8/12/2022 2024)  Return Mobility to Safest Level of Function: Assess patient stability and activity tolerance for standing, transferring and ambulating with or without assistive devices  Goal: Maintain proper alignment of affected body part  8/13/2022 0924 by Zoraida Park RN  Outcome: Progressing  Flowsheets (Taken 8/13/2022 0125)  Maintain proper alignment of affected body part:   Support and protect limb and body alignment per provider's orders   Instruct and reinforce with patient and family use of appropriate assistive device and precautions (e.g. spinal or hip dislocation precautions)  8/13/2022 0226 by Viri Webb RN  Outcome: Progressing  Flowsheets (Taken 8/13/2022 0226)  Maintain proper alignment of affected body part: Support and protect limb and body alignment per provider's orders  Goal: Return ADL status to a safe level of function  8/13/2022 0924 by Jamila Marina RN  Outcome: Progressing  Flowsheets (Taken 8/13/2022 2308)  Return ADL Status to a Safe Level of Function:   Administer medication as ordered   Assess activities of daily living deficits and provide assistive devices as needed   Obtain physical therapy/occupational therapy consults as needed   Assist and instruct patient to increase activity and self care as tolerated  8/13/2022 0226 by Viri Webb RN  Outcome: Progressing  Flowsheets (Taken 8/13/2022 0226)  Return ADL Status to a Safe Level of Function: Administer medication as ordered     Problem: Genitourinary - Adult  Goal: Absence of urinary retention  8/13/2022 0924 by Jamila Marina RN  Outcome: Progressing  Flowsheets (Taken 8/13/2022 9206)  Absence of urinary retention:   Assess patients ability to void and empty bladder   Monitor intake/output and perform bladder scan as needed  8/13/2022 0226 by Viri Webb RN  Outcome: Progressing  Flowsheets (Taken 8/12/2022 2024)  Absence of urinary retention: Assess patients ability to void and empty bladder     Problem: Metabolic/Fluid and Electrolytes - Adult  Goal: Electrolytes maintained within normal limits  8/13/2022 0924 by Jamila Marina RN  Outcome: Progressing  Flowsheets (Taken 8/13/2022 5712)  Electrolytes maintained within normal limits:   Monitor labs and assess patient for signs and symptoms of electrolyte imbalances   Administer electrolyte replacement as ordered   Monitor response to electrolyte replacements, including repeat lab results as appropriate   Instruct patient on fluid and nutrition restrictions as appropriate  8/13/2022 0226 by Britni Rodriguez RN  Outcome: Progressing  Flowsheets (Taken 8/12/2022 2024)  Electrolytes maintained within normal limits: Monitor labs and assess patient for signs and symptoms of electrolyte imbalances  Goal: Hemodynamic stability and optimal renal function maintained  8/13/2022 0924 by Edith Soni RN  Outcome: Progressing  Flowsheets (Taken 8/13/2022 5066)  Hemodynamic stability and optimal renal function maintained:   Monitor labs and assess for signs and symptoms of volume excess or deficit   Monitor intake, output and patient weight   Monitor urine specific gravity, serum osmolarity and serum sodium as indicated or ordered  8/13/2022 0226 by Britni Rodriguez RN  Outcome: Progressing  Flowsheets (Taken 8/12/2022 2024)  Hemodynamic stability and optimal renal function maintained: Monitor labs and assess for signs and symptoms of volume excess or deficit  Goal: Glucose maintained within prescribed range  8/13/2022 0924 by Edith Soni RN  Outcome: Progressing  Flowsheets (Taken 8/13/2022 0921)  Glucose maintained within prescribed range:   Monitor blood glucose as ordered   Assess for signs and symptoms of hyperglycemia and hypoglycemia   Administer ordered medications to maintain glucose within target range  8/13/2022 0226 by Britni Rodriguez RN  Outcome: Progressing  Flowsheets (Taken 8/12/2022 2024)  Glucose maintained within prescribed range: Monitor blood glucose as ordered     Problem: Hematologic - Adult  Goal: Maintains hematologic stability  8/13/2022 0924 by Edith Soni RN  Outcome: Progressing  Flowsheets (Taken 8/13/2022 1418)  Maintains hematologic stability:   Assess for signs and symptoms of bleeding or hemorrhage   Monitor labs for bleeding or clotting disorders   Administer blood products/factors as ordered  8/13/2022 0226 by Britni Rodriguez RN  Outcome: Progressing  Flowsheets (Taken 8/12/2022 2024)  Maintains hematologic stability: Assess for signs and symptoms of bleeding or hemorrhage     Care plan reviewed with patient. Patient verbalize understanding of the plan of care and contribute to goal setting.

## 2022-08-13 NOTE — PLAN OF CARE
Problem: Respiratory - Adult  Goal: Clear lung sounds  Outcome: Progressing     Continue treatments as ordered to help facilitate normal breath sounds. Continue to wean O2 per protocol as tolerated to  keep Spo2 above 92%.  Pt agreeable to Plan

## 2022-08-13 NOTE — PROGRESS NOTES
Pt sitting in chair without CPAP off. Advised pt the importance of utilizing CPAP at night. Pt  stated that he would \"put it back on later. \"

## 2022-08-13 NOTE — PROGRESS NOTES
[facility]   Nephrology progress note  8/13/2022, 10:19 AM      Pt Name:    Asuncion Riedel  MRN:     [id number]     YOB: 1960  Admit Date:    8/8/2022  6:24 PM    Chief Complaint: Nephrology following for CAROLE/CKD. Subjective:  Patient seen and examined  Seen and examined earlier today  Denies any chest pain or any shortness of breath  Still making some urine output      Objective:  24HR INTAKE/OUTPUT:    Intake/Output Summary (Last 24 hours) at 8/13/2022 1019  Last data filed at 8/13/2022 0919  Gross per 24 hour   Intake 2685.85 ml   Output 1375 ml   Net 1310.85 ml           I/O last 3 completed shifts: In: 2886.9 [P.O.:1620;  I.V.:849; Blood:310; IV Piggyback:107.8]  Out: 2200 [Urine:2200]  I/O this shift:  In: 180 [P.O.:180]  Out: 250 [Urine:250]     Admission weight: (!) 329 lb (149.2 kg)  Wt Readings from Last 3 Encounters:   08/13/22 (!) 326 lb 11.2 oz (148.2 kg)   08/04/22 (!) 329 lb (149.2 kg)   07/25/22 (!) 329 lb (149.2 kg)        Vitals :   Vitals:    08/13/22 0343 08/13/22 0600 08/13/22 0723 08/13/22 0741   BP: 137/70  138/63    Pulse: 62  63 64   Resp: 18  20 18   Temp: 97.9 °F (36.6 °C)  97.5 °F (36.4 °C)    TempSrc: Oral  Oral    SpO2: 91% 94% 96% 95%   Weight: (!) 326 lb 11.2 oz (148.2 kg)      Height:           Physical examination  General Appearance: appears comfortable, no distress  Mouth/Throat: Oral mucosa moist  Neck: No JVD  Lungs: no use of accessory muscles  GI: soft, non-tender,   Extremities: Mild edema noted bilateral    Medications:  Infusion:    sodium chloride 50 mL/hr at 08/13/22 0917    dextrose      sodium chloride       Meds:    iron sucrose  200 mg IntraVENous Daily    rOPINIRole  1 mg Oral Nightly    epoetin justin-epbx  2,000 Units SubCUTAneous Once per day on Mon Wed Fri    And    epoetin justin-epbx  3,000 Units SubCUTAneous Once per day on Mon Wed Fri    sodium chloride flush  5-40 mL IntraVENous 2 times per day    [Held by provider] enoxaparin  30 mg SubCUTAneous Q24H    [Held by provider] aspirin EC  81 mg Oral Daily    montelukast  10 mg Oral Nightly    mometasone-formoterol  2 puff Inhalation BID    rOPINIRole  0.5 mg Oral Daily    carvedilol  50 mg Oral BID WC    [Held by provider] tamsulosin  0.4 mg Oral Nightly    levothyroxine  175 mcg Oral Daily    oxybutynin  5 mg Oral Nightly     Meds prn: albuterol sulfate HFA, doxepin, glucose, dextrose bolus **OR** dextrose bolus, glucagon (rDNA), dextrose, sodium chloride flush, sodium chloride, ondansetron **OR** ondansetron, polyethylene glycol, acetaminophen **OR** acetaminophen, oxyCODONE-acetaminophen, nitroGLYCERIN, melatonin     Lab Data :  CBC:   Recent Labs     08/11/22  0456 08/12/22  0457 08/12/22  1401 08/13/22  0429   WBC 5.2 4.9  --  4.9   HGB 7.1* 6.8* 7.6* 7.5*   HCT 24.5* 23.0* 24.8* 24.4*    175  --  184       CMP:  Recent Labs     08/11/22  0456 08/12/22  0457 08/13/22  0429    136 136   K 5.3* 4.4 4.2    98 100   CO2 23 27 25   BUN 43* 24* 31*   CREATININE 4.8* 4.0* 4.9*   GLUCOSE 97 84 77   CALCIUM 9.0 8.6 8.8       Hepatic:   No results for input(s): LABALBU, AST, ALT, ALB, BILITOT, ALKPHOS in the last 72 hours. Assessment and Plan:  1. 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 Patient had 2 dialysis treatments so far  Intradialytic creatinine rising  We will get him dialyzed again today    2. Hyperkalemia, improved with dialysis  3. Diabetic nephropathy with proteinuria  4. Hypertension  5. Anemia in CKD. Iron saturation 12%. On iron and ISAAC  6. Obesity  7. Sleep apnea    D/W patient nursing staff    Gail Soni MD  [facility]    This report has been created using voice recognition software.  It may contain minor errors which are inherent in voice recognition technology

## 2022-08-13 NOTE — FLOWSHEET NOTE
08/13/22 1117   Safe Environment   Safety Measures   (virtual safety rounds complete)   Virtual nurse rounds. Patient stated he was getting his bath. Camera not turned on.

## 2022-08-14 LAB
ANION GAP SERPL CALCULATED.3IONS-SCNC: 13 MEQ/L (ref 8–16)
BUN BLDV-MCNC: 26 MG/DL (ref 7–22)
CALCIUM SERPL-MCNC: 9.2 MG/DL (ref 8.5–10.5)
CHLORIDE BLD-SCNC: 101 MEQ/L (ref 98–111)
CO2: 24 MEQ/L (ref 23–33)
CREAT SERPL-MCNC: 4.1 MG/DL (ref 0.4–1.2)
ERYTHROCYTE [DISTWIDTH] IN BLOOD BY AUTOMATED COUNT: 17.1 % (ref 11.5–14.5)
ERYTHROCYTE [DISTWIDTH] IN BLOOD BY AUTOMATED COUNT: 53.1 FL (ref 35–45)
GFR SERPL CREATININE-BSD FRML MDRD: 15 ML/MIN/1.73M2
GLUCOSE BLD-MCNC: 101 MG/DL (ref 70–108)
GLUCOSE BLD-MCNC: 102 MG/DL (ref 70–108)
GLUCOSE BLD-MCNC: 83 MG/DL (ref 70–108)
GLUCOSE BLD-MCNC: 86 MG/DL (ref 70–108)
GLUCOSE BLD-MCNC: 97 MG/DL (ref 70–108)
HCT VFR BLD CALC: 27.4 % (ref 42–52)
HEMOGLOBIN: 8.3 GM/DL (ref 14–18)
MCH RBC QN AUTO: 26.3 PG (ref 26–33)
MCHC RBC AUTO-ENTMCNC: 30.3 GM/DL (ref 32.2–35.5)
MCV RBC AUTO: 86.7 FL (ref 80–94)
PLATELET # BLD: 206 THOU/MM3 (ref 130–400)
PMV BLD AUTO: 8.7 FL (ref 9.4–12.4)
POTASSIUM SERPL-SCNC: 4.1 MEQ/L (ref 3.5–5.2)
RBC # BLD: 3.16 MILL/MM3 (ref 4.7–6.1)
SODIUM BLD-SCNC: 138 MEQ/L (ref 135–145)
WBC # BLD: 5.2 THOU/MM3 (ref 4.8–10.8)

## 2022-08-14 PROCEDURE — 6360000002 HC RX W HCPCS: Performed by: INTERNAL MEDICINE

## 2022-08-14 PROCEDURE — 82948 REAGENT STRIP/BLOOD GLUCOSE: CPT

## 2022-08-14 PROCEDURE — 80048 BASIC METABOLIC PNL TOTAL CA: CPT

## 2022-08-14 PROCEDURE — 94640 AIRWAY INHALATION TREATMENT: CPT

## 2022-08-14 PROCEDURE — 6370000000 HC RX 637 (ALT 250 FOR IP)

## 2022-08-14 PROCEDURE — 85027 COMPLETE CBC AUTOMATED: CPT

## 2022-08-14 PROCEDURE — 97162 PT EVAL MOD COMPLEX 30 MIN: CPT

## 2022-08-14 PROCEDURE — 99232 SBSQ HOSP IP/OBS MODERATE 35: CPT | Performed by: INTERNAL MEDICINE

## 2022-08-14 PROCEDURE — 6370000000 HC RX 637 (ALT 250 FOR IP): Performed by: INTERNAL MEDICINE

## 2022-08-14 PROCEDURE — 97116 GAIT TRAINING THERAPY: CPT

## 2022-08-14 PROCEDURE — 2700000000 HC OXYGEN THERAPY PER DAY

## 2022-08-14 PROCEDURE — 36415 COLL VENOUS BLD VENIPUNCTURE: CPT

## 2022-08-14 PROCEDURE — 1200000003 HC TELEMETRY R&B

## 2022-08-14 PROCEDURE — 94760 N-INVAS EAR/PLS OXIMETRY 1: CPT

## 2022-08-14 RX ORDER — HEPARIN SODIUM 5000 [USP'U]/ML
5000 INJECTION, SOLUTION INTRAVENOUS; SUBCUTANEOUS EVERY 8 HOURS SCHEDULED
Status: DISCONTINUED | OUTPATIENT
Start: 2022-08-14 | End: 2022-08-17 | Stop reason: HOSPADM

## 2022-08-14 RX ORDER — POLYETHYLENE GLYCOL 3350 17 G/17G
17 POWDER, FOR SOLUTION ORAL DAILY
Status: DISCONTINUED | OUTPATIENT
Start: 2022-08-14 | End: 2022-08-17 | Stop reason: HOSPADM

## 2022-08-14 RX ADMIN — AMITRIPTYLINE HYDROCHLORIDE 10 MG: 10 TABLET, FILM COATED ORAL at 21:39

## 2022-08-14 RX ADMIN — HYDRALAZINE HYDROCHLORIDE 25 MG: 25 TABLET, FILM COATED ORAL at 23:47

## 2022-08-14 RX ADMIN — Medication 4.5 MG: at 21:38

## 2022-08-14 RX ADMIN — OXYCODONE AND ACETAMINOPHEN 1 TABLET: 10; 325 TABLET ORAL at 05:31

## 2022-08-14 RX ADMIN — HEPARIN SODIUM 5000 UNITS: 5000 INJECTION INTRAVENOUS; SUBCUTANEOUS at 15:03

## 2022-08-14 RX ADMIN — POLYETHYLENE GLYCOL 3350 17 G: 17 POWDER, FOR SOLUTION ORAL at 08:20

## 2022-08-14 RX ADMIN — CARVEDILOL 50 MG: 25 TABLET, FILM COATED ORAL at 08:15

## 2022-08-14 RX ADMIN — GABAPENTIN 300 MG: 300 CAPSULE ORAL at 21:38

## 2022-08-14 RX ADMIN — OXYCODONE AND ACETAMINOPHEN 1 TABLET: 10; 325 TABLET ORAL at 13:08

## 2022-08-14 RX ADMIN — ROPINIROLE HYDROCHLORIDE 0.5 MG: 1 TABLET, FILM COATED ORAL at 13:08

## 2022-08-14 RX ADMIN — AMITRIPTYLINE HYDROCHLORIDE 10 MG: 10 TABLET, FILM COATED ORAL at 08:14

## 2022-08-14 RX ADMIN — HYDRALAZINE HYDROCHLORIDE 25 MG: 25 TABLET, FILM COATED ORAL at 13:08

## 2022-08-14 RX ADMIN — OXYCODONE AND ACETAMINOPHEN 1 TABLET: 10; 325 TABLET ORAL at 21:38

## 2022-08-14 RX ADMIN — LEVOTHYROXINE SODIUM 175 MCG: 0.03 TABLET ORAL at 05:33

## 2022-08-14 RX ADMIN — MONTELUKAST SODIUM 10 MG: 10 TABLET ORAL at 21:38

## 2022-08-14 RX ADMIN — OXYBUTYNIN CHLORIDE 5 MG: 5 TABLET, EXTENDED RELEASE ORAL at 21:38

## 2022-08-14 RX ADMIN — ROPINIROLE HYDROCHLORIDE 1 MG: 1 TABLET, FILM COATED ORAL at 21:38

## 2022-08-14 RX ADMIN — ONDANSETRON 4 MG: 4 TABLET, ORALLY DISINTEGRATING ORAL at 22:56

## 2022-08-14 RX ADMIN — HYDRALAZINE HYDROCHLORIDE 25 MG: 25 TABLET, FILM COATED ORAL at 05:33

## 2022-08-14 RX ADMIN — DOXEPIN HYDROCHLORIDE 50 MG: 50 CAPSULE ORAL at 21:38

## 2022-08-14 RX ADMIN — MOMETASONE FUROATE AND FORMOTEROL FUMARATE DIHYDRATE 2 PUFF: 100; 5 AEROSOL RESPIRATORY (INHALATION) at 17:08

## 2022-08-14 RX ADMIN — MOMETASONE FUROATE AND FORMOTEROL FUMARATE DIHYDRATE 2 PUFF: 100; 5 AEROSOL RESPIRATORY (INHALATION) at 07:33

## 2022-08-14 ASSESSMENT — PAIN DESCRIPTION - ORIENTATION
ORIENTATION: LOWER

## 2022-08-14 ASSESSMENT — PAIN SCALES - GENERAL
PAINLEVEL_OUTOF10: 6
PAINLEVEL_OUTOF10: 9
PAINLEVEL_OUTOF10: 8
PAINLEVEL_OUTOF10: 8

## 2022-08-14 ASSESSMENT — PAIN DESCRIPTION - PAIN TYPE: TYPE: CHRONIC PAIN

## 2022-08-14 ASSESSMENT — PAIN DESCRIPTION - LOCATION
LOCATION: BACK

## 2022-08-14 ASSESSMENT — PAIN - FUNCTIONAL ASSESSMENT
PAIN_FUNCTIONAL_ASSESSMENT: ACTIVITIES ARE NOT PREVENTED

## 2022-08-14 ASSESSMENT — PAIN DESCRIPTION - DESCRIPTORS
DESCRIPTORS: ACHING

## 2022-08-14 ASSESSMENT — PAIN DESCRIPTION - ONSET: ONSET: ON-GOING

## 2022-08-14 ASSESSMENT — PAIN DESCRIPTION - FREQUENCY: FREQUENCY: CONTINUOUS

## 2022-08-14 NOTE — FLOWSHEET NOTE
08/14/22 0940   Safe Environment   Safety Measures Other (comment)  (virtual safety round complete)   Virtual RN rounds, patient denies needs at this time

## 2022-08-14 NOTE — FLOWSHEET NOTE
08/14/22 6590   Safe Environment   Safety Measures Other (comment)  (virtual safety round complete)     Virtual nurse rounds, pt did not respond to audio, so camera turned on to visually check safety of patient.  pt sleeping, resp unlabored

## 2022-08-14 NOTE — PLAN OF CARE
Problem: Respiratory - Adult  Goal: Clear lung sounds  Outcome: Progressing  Note: Mdi to maintain open airways. Patient mutually agreed on goals.

## 2022-08-14 NOTE — PLAN OF CARE
Problem: Discharge Planning  Goal: Discharge to home or other facility with appropriate resources  Outcome: Progressing  Flowsheets (Taken 8/14/2022 0200)  Discharge to home or other facility with appropriate resources:   Identify barriers to discharge with patient and caregiver   Arrange for needed discharge resources and transportation as appropriate     Problem: Pain  Goal: Verbalizes/displays adequate comfort level or baseline comfort level  Outcome: Progressing  Flowsheets (Taken 8/14/2022 0200)  Verbalizes/displays adequate comfort level or baseline comfort level:   Encourage patient to monitor pain and request assistance   Assess pain using appropriate pain scale   Administer analgesics based on type and severity of pain and evaluate response   Implement non-pharmacological measures as appropriate and evaluate response     Problem: Chronic Conditions and Co-morbidities  Goal: Patient's chronic conditions and co-morbidity symptoms are monitored and maintained or improved  Outcome: Progressing  Flowsheets (Taken 8/14/2022 0200)  Care Plan - Patient's Chronic Conditions and Co-Morbidity Symptoms are Monitored and Maintained or Improved: Monitor and assess patient's chronic conditions and comorbid symptoms for stability, deterioration, or improvement     Problem: Safety - Adult  Goal: Free from fall injury  Outcome: Progressing  Flowsheets (Taken 8/14/2022 0200)  Free From Fall Injury: Instruct family/caregiver on patient safety     Problem: ABCDS Injury Assessment  Goal: Absence of physical injury  Outcome: Progressing  Flowsheets (Taken 8/14/2022 0153)  Absence of Physical Injury: Implement safety measures based on patient assessment     Problem: Skin/Tissue Integrity  Goal: Absence of new skin breakdown  Description: 1. Monitor for areas of redness and/or skin breakdown  2. Assess vascular access sites hourly  3. Every 4-6 hours minimum:  Change oxygen saturation probe site  4.   Every 4-6 hours:  If on nasal continuous positive airway pressure, respiratory therapy assess nares and determine need for appliance change or resting period.   Outcome: Progressing     Problem: Respiratory - Adult  Goal: Achieves optimal ventilation and oxygenation  Outcome: Progressing  Flowsheets (Taken 8/13/2022 0921 by Ale Alfonso RN)  Achieves optimal ventilation and oxygenation:   Assess for changes in respiratory status   Assess for changes in mentation and behavior   Position to facilitate oxygenation and minimize respiratory effort     Problem: Cardiovascular - Adult  Goal: Maintains optimal cardiac output and hemodynamic stability  Outcome: Progressing  Flowsheets (Taken 8/13/2022 0921 by Ale Alfonso RN)  Maintains optimal cardiac output and hemodynamic stability:   Monitor blood pressure and heart rate   Monitor urine output and notify Licensed Independent Practitioner for values outside of normal range   Assess for signs of decreased cardiac output     Problem: Cardiovascular - Adult  Goal: Absence of cardiac dysrhythmias or at baseline  Outcome: Progressing  Flowsheets (Taken 8/14/2022 0200)  Absence of cardiac dysrhythmias or at baseline: Monitor cardiac rate and rhythm     Problem: Skin/Tissue Integrity - Adult  Goal: Skin integrity remains intact  Outcome: Progressing  Flowsheets  Taken 8/14/2022 0200  Skin Integrity Remains Intact: Monitor for areas of redness and/or skin breakdown  Taken 8/14/2022 0154  Skin Integrity Remains Intact: Monitor for areas of redness and/or skin breakdown     Problem: Skin/Tissue Integrity - Adult  Goal: Incisions, wounds, or drain sites healing without S/S of infection  Outcome: Progressing     Problem: Skin/Tissue Integrity - Adult  Goal: Oral mucous membranes remain intact  Outcome: Progressing     Problem: Musculoskeletal - Adult  Goal: Return mobility to safest level of function  Outcome: Progressing  Flowsheets (Taken 8/14/2022 0200)  Return Mobility to Safest Level of Function:   Assess patient stability and activity tolerance for standing, transferring and ambulating with or without assistive devices   Assist with transfers and ambulation using safe patient handling equipment as needed     Problem: Musculoskeletal - Adult  Goal: Maintain proper alignment of affected body part  Outcome: Progressing  Flowsheets (Taken 8/14/2022 0200)  Maintain proper alignment of affected body part: Support and protect limb and body alignment per provider's orders     Problem: Musculoskeletal - Adult  Goal: Return ADL status to a safe level of function  Outcome: Progressing  Flowsheets (Taken 8/14/2022 0200)  Return ADL Status to a Safe Level of Function: Administer medication as ordered     Problem: Genitourinary - Adult  Goal: Absence of urinary retention  Outcome: Progressing  Flowsheets (Taken 8/14/2022 0200)  Absence of urinary retention: Assess patients ability to void and empty bladder     Problem: Metabolic/Fluid and Electrolytes - Adult  Goal: Electrolytes maintained within normal limits  Outcome: Progressing  Flowsheets (Taken 8/14/2022 0200)  Electrolytes maintained within normal limits: Monitor labs and assess patient for signs and symptoms of electrolyte imbalances     Problem: Metabolic/Fluid and Electrolytes - Adult  Goal: Hemodynamic stability and optimal renal function maintained  Outcome: Progressing  Flowsheets (Taken 8/14/2022 0200)  Hemodynamic stability and optimal renal function maintained:   Monitor labs and assess for signs and symptoms of volume excess or deficit   Monitor intake, output and patient weight     Problem: Metabolic/Fluid and Electrolytes - Adult  Goal: Glucose maintained within prescribed range  Outcome: Progressing  Flowsheets (Taken 8/14/2022 0200)  Glucose maintained within prescribed range:   Monitor blood glucose as ordered   Assess for signs and symptoms of hyperglycemia and hypoglycemia     Problem: Hematologic - Adult  Goal: Maintains hematologic stability  Outcome: Progressing

## 2022-08-14 NOTE — FLOWSHEET NOTE
08/14/22 4874   Safe Environment   Safety Measures Other (comment)   Virtual nurse rounds, pt did not respond to audio, so camera turned on to visually check safety of patient.  pt sleeping, resp unlabored

## 2022-08-14 NOTE — PROGRESS NOTES
[facility]   Nephrology progress note  8/14/2022, 11:38 AM      Pt Name:    Marcelino Olmedo  MRN:     [id number]     YOB: 1960  Admit Date:    8/8/2022  6:24 PM    Chief Complaint: Nephrology following for CAROLE/CKD. Subjective:  Patient seen and examined  Seen and examined earlier today  Denies any chest pain or any shortness of breath  Still making some urine output  Some back pain      Objective:  24HR INTAKE/OUTPUT:    Intake/Output Summary (Last 24 hours) at 8/14/2022 1138  Last data filed at 8/14/2022 1136  Gross per 24 hour   Intake 660 ml   Output 2675 ml   Net -2015 ml           I/O last 3 completed shifts: In: 2376.5 [P.O.:1310;  I.V.:666.5]  Out: 3775 [Urine:2375]  I/O this shift:  In: -   Out: 275 [Urine:275]     Admission weight: (!) 329 lb (149.2 kg)  Wt Readings from Last 3 Encounters:   08/14/22 (!) 324 lb (147 kg)   08/04/22 (!) 329 lb (149.2 kg)   07/25/22 (!) 329 lb (149.2 kg)        Vitals :   Vitals:    08/14/22 0533 08/14/22 0733 08/14/22 0800 08/14/22 1116   BP: (!) 140/74  119/76 132/60   Pulse:   88 63   Resp:   18 18   Temp:   98.2 °F (36.8 °C) 98.3 °F (36.8 °C)   TempSrc:   Oral Oral   SpO2:  94% 96% 96%   Weight:       Height:           Physical examination  General Appearance: appears comfortable, no distress  Mouth/Throat: Oral mucosa moist  Neck: No JVD  Lungs: no use of accessory muscles  GI: soft, non-tender,   Extremities: Mild edema noted bilateral    Medications:  Infusion:    dextrose      sodium chloride       Meds:    polyethylene glycol  17 g Oral Daily    heparin (porcine)  5,000 Units SubCUTAneous 3 times per day    amitriptyline  10 mg Oral BID    doxepin  50 mg Oral Nightly    gabapentin  300 mg Oral Nightly    hydrALAZINE  25 mg Oral 3 times per day    iron sucrose  200 mg IntraVENous Daily    rOPINIRole  1 mg Oral Nightly    epoetin justin-epbx  2,000 Units SubCUTAneous Once per day on Mon Wed Fri    And    epoetin justin-epbx  3,000 Units SubCUTAneous Once per day on Mon Wed Fri    sodium chloride flush  5-40 mL IntraVENous 2 times per day    [Held by provider] aspirin EC  81 mg Oral Daily    montelukast  10 mg Oral Nightly    mometasone-formoterol  2 puff Inhalation BID    rOPINIRole  0.5 mg Oral Daily    carvedilol  50 mg Oral BID WC    [Held by provider] tamsulosin  0.4 mg Oral Nightly    levothyroxine  175 mcg Oral Daily    oxybutynin  5 mg Oral Nightly     Meds prn: bisacodyl, albuterol sulfate HFA, doxepin, glucose, dextrose bolus **OR** dextrose bolus, glucagon (rDNA), dextrose, sodium chloride flush, sodium chloride, ondansetron **OR** ondansetron, acetaminophen **OR** acetaminophen, oxyCODONE-acetaminophen, nitroGLYCERIN, melatonin     Lab Data :  CBC:   Recent Labs     08/12/22 0457 08/12/22  1401 08/13/22  0429 08/13/22  1659 08/14/22  0528   WBC 4.9  --  4.9  --  5.2   HGB 6.8*   < > 7.5* 7.5* 8.3*   HCT 23.0*   < > 24.4* 24.2* 27.4*     --  184  --  206    < > = values in this interval not displayed. CMP:  Recent Labs     08/12/22 0457 08/13/22  0429 08/14/22  0528    136 138   K 4.4 4.2 4.1   CL 98 100 101   CO2 27 25 24   BUN 24* 31* 26*   CREATININE 4.0* 4.9* 4.1*   GLUCOSE 84 77 86   CALCIUM 8.6 8.8 9.2       Hepatic:   No results for input(s): LABALBU, AST, ALT, ALB, BILITOT, ALKPHOS in the last 72 hours. Assessment and Plan:  1. 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 Intradialytic creatinine rising so dialyzed him yesterday  No acute need for dialysis today  Reassess in the morning    2. Hyperkalemia, improved with dialysis  3. Diabetic nephropathy with proteinuria  4. Hypertension  5. Anemia in CKD. Iron saturation 12%. On iron and ISAAC  6. Obesity  7. Sleep apnea    Thierno Oneill MD  [facility]    This report has been created using voice recognition software.  It may contain minor errors

## 2022-08-14 NOTE — PROGRESS NOTES
[facility]  INPATIENT PHYSICAL THERAPY  EVALUATION  [facility] RENAL TELEMETRY 6K - 6K-01/001-A    Time In: 1200  Time Out: 1214  Timed Code Treatment Minutes: 8 Minutes  Minutes: 14          Date: 2022  Patient Name: Marielena Razo,  Gender:  male        MRN: [id number]  : 1960  (63 y.o.)      Referring Practitioner: Rosina Howard PA-C  Diagnosis: Hyperkalemia  Additional Pertinent Hx: 63 y.o. male who presented to [facility] with abnormal labs. Patient went to regular scheduled weekly labs drawn and call back from nephrology service about abnormal labs and sent to ED [facility]. Patient complains fatigue which is worse than used to be. Denies chest pain heart palpitations dizziness lightheadedness fever chills productive cough abdominal pain dysuria. In ED, found potassium 7.0, BUN 72, creatinine 6.6, anion gap 16, GFR 9, magnesium 2.5, hemoglobin 8.6, hematocrit 28, MCV 85, troponin 0.0 35  EKG is negative for acute ischemic changes. Restrictions/Precautions:  Restrictions/Precautions: Fall Risk  Position Activity Restriction  Other position/activity restrictions: 2L O2 on 2022    Subjective:  Chart Reviewed: Yes  Patient assessed for rehabilitation services?: Yes  Family / Caregiver Present: No  Subjective: RN approved session.  Pt pleasant and agreeable to therapy    General:  Overall Orientation Status: Within Functional Limits  Vision: Impaired  Vision Exceptions: Wears glasses at all times  Hearing: Within functional limits       Pain: 9/10: back ; chronic     Vitals: Vitals not assessed per clinical judgement, see nursing flowsheet  *2L NC     Social/Functional History:    Lives With: Spouse  Type of Home: House  Home Layout: Work area in basement, Two level, Performs ADL's on one level (bedroom is upstairs but pt has been sleeping in recliner on main floor)  Home Access: Stairs to enter with rails (4)  Entrance Stairs - Rails: [address] Equipment: Cane, quad, Oxygen (2-4L O2 at home)     Bathroom Shower/Tub: Walk-in shower  Bathroom Toilet: Standard       ADL Assistance: Independent     Ambulation Assistance: Independent  Transfer Assistance: Independent          Additional Comments: Pt reports using no AD at home, quad cane for community distance. Has been sleeping in recliner on 1st level, has bathroom on 1st level-\"man cave\" in basement. Pt reports falling back into recliner x 2 recently d/t being dizzy headed. 2-4L O2 all the time at home. OBJECTIVE:  Range of Motion:  Bilateral Lower Extremity: WFL    Strength:  Bilateral Lower Extremity: Impaired - grossly deconditioned    Balance:  Static Sitting Balance:  Supervision  Static Standing Balance: Stand By Assistance    Bed Mobility:  Not Tested    Transfers:  Sit to Stand: Stand By Assistance  Stand to Sit:Stand By Assistance    Ambulation:  Stand By Assistance  Distance: 15'  Surface: Level Tile  Device:Cane  Gait Deviations:  Slow Diane, Decreased Step Length Bilaterally, and Decreased Gait Speed    Functional Outcome Measures: Completed  AM-PAC Inpatient Mobility Raw Score : 18  AM-PAC Inpatient T-Scale Score : 43.63    ASSESSMENT:  Activity Tolerance:  Patient tolerance of  treatment: good. Pt with no significant SOB or LOB. Pt is limited by back pain       Treatment Initiated: Treatment and education initiated within context of evaluation. Evaluation time included review of current medical information, gathering information related to past medical, social and functional history, completion of standardized testing, formal and informal observation of tasks, assessment of data and development of plan of care and goals. Treatment time included skilled education and facilitation of tasks to increase safety and independence with functional mobility for improved independence and quality of life. Assessment:   Body Structures, Functions, Activity Limitations Requiring Skilled Therapeutic Intervention: Decreased functional mobility , Decreased strength, Decreased endurance, Decreased balance, Decreased posture  Assessment: Bruce Wells is a 63 y.o. male that presents with hyperkalemia. he is ind prior to admission now requiring assist for basic mobility. Pt demonstrates a decrease in baseline by way of bed mobility, transfers and ambulation secondary to decreased activity tolerance, strength, fatigue, and balance deficits. Pt will benefit from skilled PT services throughout admission and beyond hospital discharge for improvements in functional mobility and in order to decrease fall risk and return pt to [facility]. Therapy Prognosis: Good    Requires PT Follow-Up: Yes    Discharge Recommendations:  Discharge Recommendations: Home with Home health PT    Patient Education:      . Patient Education  Education Given To: Patient  Education Provided: Role of Therapy, Plan of Care (Goals of therapy, energy conservation)  Education Method: Verbal  Barriers to Learning: None  Education Outcome: Verbalized understanding       Equipment Recommendations:  Equipment Needed: No    Plan:  Current Treatment Recommendations: Strengthening, Balance training, Endurance training, Functional mobility training, Gait training, Stair training  Plan:  (5x GM)    Goals:  Patient goals : be able to go back upstairs  Short Term Goals  Time Frame for Short term goals: by discharge  Short term goal 1: bed mobility with HOB flat, no rails, mod I for increased functional ind  Short term goal 2: sit<>stand from various surfaces with LRD mod I for safe transfers  Short term goal 3: abmulate 100' with LRD mod I for safe household distances  Short term goal 4: navigate 4 steps with LRD mod I for safe enter/exit of home  Long Term Goals  Time Frame for Long term goals : NA d/t short ELOS    Following session, patient left in safe position with all fall risk precautions in place.     Shama Cuellar ([facility]) PT, DPT

## 2022-08-14 NOTE — PLAN OF CARE
Problem: Respiratory - Adult  Goal: Clear lung sounds  8/14/2022 1721 by Donny Artis RCP  Outcome: Progressing  Note: Mdi to maintain open airways. Patient mutually agreed on goals.

## 2022-08-14 NOTE — PROGRESS NOTES
Assessment and Plan:        ESRD- getting dialysis; ?tunneled cath? Nephrology following  Dm- mild; not needing any coverage; quit checking  Hbp- reasonable control on meds; continue:       CC:  abnormal labs  HPI: pt with hbp, dm, ckd, sent to ER by his Nephrologist due to elevated creat and hyperkalemia. Undergoing HD.    ROS (12 point review of systems completed. Pertinent positives noted.  Otherwise ROS is negative) : hx copd  PMH:  Per HPI  SHX:  , nonsmoker  FHX: heart, cancer, diabetes  Allergies: See above    Medications:     dextrose      sodium chloride        polyethylene glycol  17 g Oral Daily    heparin (porcine)  5,000 Units SubCUTAneous 3 times per day    amitriptyline  10 mg Oral BID    doxepin  50 mg Oral Nightly    gabapentin  300 mg Oral Nightly    hydrALAZINE  25 mg Oral 3 times per day    iron sucrose  200 mg IntraVENous Daily    rOPINIRole  1 mg Oral Nightly    epoetin justin-epbx  2,000 Units SubCUTAneous Once per day on Mon Wed Fri    And    epoetin justin-epbx  3,000 Units SubCUTAneous Once per day on Mon Wed Fri    sodium chloride flush  5-40 mL IntraVENous 2 times per day    [Held by provider] aspirin EC  81 mg Oral Daily    montelukast  10 mg Oral Nightly    mometasone-formoterol  2 puff Inhalation BID    rOPINIRole  0.5 mg Oral Daily    carvedilol  50 mg Oral BID WC    [Held by provider] tamsulosin  0.4 mg Oral Nightly    levothyroxine  175 mcg Oral Daily    oxybutynin  5 mg Oral Nightly       Vital Signs:   BP (!) 140/74   Pulse 63   Temp 97.7 °F (36.5 °C) (Oral)   Resp 16   Ht 6' 1\" (1.854 m)   Wt (!) 324 lb (147 kg)   SpO2 94%   BMI 42.75 kg/m²      Intake/Output Summary (Last 24 hours) at 8/14/2022 0802  Last data filed at 8/14/2022 0533  Gross per 24 hour   Intake 840 ml   Output 2800 ml   Net -1960 ml        Physical Examination: General appearance - alert, well appearing, and in no distress and overweight  Mental status - alert, oriented to person, place, and time  Neck - supple, no significant adenopathy, no JVD, or carotid bruits  Chest - clear to auscultation, no wheezes, rales or rhonchi, symmetric air entry  Heart - normal rate, regular rhythm, normal S1, S2, no murmurs, rubs, clicks or gallops  Abdomen - soft, nontender, nondistended, no masses or organomegaly  Neurological - alert, oriented, normal speech, no focal findings or movement disorder noted  Musculoskeletal - no joint tenderness, deformity or swelling, no muscular tenderness noted  Extremities - no pedal edema noted  Skin - normal coloration and turgor, no rashes, no suspicious skin lesions noted    Data: (All radiographs, tracings, PFTs, and imaging are personally viewed and interpreted unless otherwise noted). ```````````````````````````````````````````````````````````````````````````````````````````````````````````````````````````````````````````````````````````````````````````````````````````````````````````````````````````````````````````````````````````````````````````````````````````````````````````````````````````````````````````````````````````````````````````````````````````````````````````````````````````````````````````````````````````````````````````````````````````````````````````````````````````````````````````````````````````````````````````````````````````````````````````````````````````````````````````````````````````````````````````````````````````````````````````````````````````````````````````````````````````````````````````````````````````````````````````````````````````````````````````````````````````````````````````````````````````````````````````````````````````````````````````````````````````````````````````````````````````````````````````````````````````````````````````````````````````````````````````````````````````````````````````````````````````````````````````````````````````````````````````````````````````````````````````````````````````````````````````````````````````````````````````````````````````````````````````````````````````````````````````````````````````````````````````````````````````````````````````````````````````````````````````````````````````````````````````````````````````````````````````````````````````````````````````````````````````````````````````````````````````````````````````````````````````````````````````````````````````````````````````````````````````````````      Electronically signed by Austin Trujillo MD on 8/14/2022 at 8:02 AM

## 2022-08-15 LAB
ANION GAP SERPL CALCULATED.3IONS-SCNC: 12 MEQ/L (ref 8–16)
BUN BLDV-MCNC: 29 MG/DL (ref 7–22)
CALCIUM SERPL-MCNC: 8.5 MG/DL (ref 8.5–10.5)
CHLORIDE BLD-SCNC: 101 MEQ/L (ref 98–111)
CO2: 24 MEQ/L (ref 23–33)
CREAT SERPL-MCNC: 4.6 MG/DL (ref 0.4–1.2)
GFR SERPL CREATININE-BSD FRML MDRD: 13 ML/MIN/1.73M2
GLUCOSE BLD-MCNC: 174 MG/DL (ref 70–108)
GLUCOSE BLD-MCNC: 71 MG/DL (ref 70–108)
GLUCOSE BLD-MCNC: 78 MG/DL (ref 70–108)
GLUCOSE BLD-MCNC: 92 MG/DL (ref 70–108)
GLUCOSE BLD-MCNC: 97 MG/DL (ref 70–108)
POTASSIUM SERPL-SCNC: 4.3 MEQ/L (ref 3.5–5.2)
SODIUM BLD-SCNC: 137 MEQ/L (ref 135–145)

## 2022-08-15 PROCEDURE — 36415 COLL VENOUS BLD VENIPUNCTURE: CPT

## 2022-08-15 PROCEDURE — 2580000003 HC RX 258

## 2022-08-15 PROCEDURE — 94640 AIRWAY INHALATION TREATMENT: CPT

## 2022-08-15 PROCEDURE — 6370000000 HC RX 637 (ALT 250 FOR IP): Performed by: INTERNAL MEDICINE

## 2022-08-15 PROCEDURE — 6370000000 HC RX 637 (ALT 250 FOR IP)

## 2022-08-15 PROCEDURE — 80048 BASIC METABOLIC PNL TOTAL CA: CPT

## 2022-08-15 PROCEDURE — 2580000003 HC RX 258: Performed by: INTERNAL MEDICINE

## 2022-08-15 PROCEDURE — 99232 SBSQ HOSP IP/OBS MODERATE 35: CPT | Performed by: INTERNAL MEDICINE

## 2022-08-15 PROCEDURE — 6360000002 HC RX W HCPCS: Performed by: INTERNAL MEDICINE

## 2022-08-15 PROCEDURE — 94760 N-INVAS EAR/PLS OXIMETRY 1: CPT

## 2022-08-15 PROCEDURE — 2700000000 HC OXYGEN THERAPY PER DAY

## 2022-08-15 PROCEDURE — 1200000003 HC TELEMETRY R&B

## 2022-08-15 PROCEDURE — 97535 SELF CARE MNGMENT TRAINING: CPT

## 2022-08-15 PROCEDURE — 82948 REAGENT STRIP/BLOOD GLUCOSE: CPT

## 2022-08-15 RX ADMIN — ROPINIROLE HYDROCHLORIDE 1 MG: 1 TABLET, FILM COATED ORAL at 21:05

## 2022-08-15 RX ADMIN — HEPARIN SODIUM 5000 UNITS: 5000 INJECTION INTRAVENOUS; SUBCUTANEOUS at 21:03

## 2022-08-15 RX ADMIN — HYDRALAZINE HYDROCHLORIDE 25 MG: 25 TABLET, FILM COATED ORAL at 21:04

## 2022-08-15 RX ADMIN — HYDRALAZINE HYDROCHLORIDE 25 MG: 25 TABLET, FILM COATED ORAL at 13:58

## 2022-08-15 RX ADMIN — MONTELUKAST SODIUM 10 MG: 10 TABLET ORAL at 21:04

## 2022-08-15 RX ADMIN — HYDRALAZINE HYDROCHLORIDE 25 MG: 25 TABLET, FILM COATED ORAL at 06:48

## 2022-08-15 RX ADMIN — LEVOTHYROXINE SODIUM 175 MCG: 0.03 TABLET ORAL at 06:48

## 2022-08-15 RX ADMIN — CARVEDILOL 50 MG: 25 TABLET, FILM COATED ORAL at 09:05

## 2022-08-15 RX ADMIN — OXYCODONE AND ACETAMINOPHEN 1 TABLET: 10; 325 TABLET ORAL at 22:54

## 2022-08-15 RX ADMIN — ROPINIROLE HYDROCHLORIDE 0.5 MG: 1 TABLET, FILM COATED ORAL at 13:57

## 2022-08-15 RX ADMIN — OXYBUTYNIN CHLORIDE 5 MG: 5 TABLET, EXTENDED RELEASE ORAL at 21:05

## 2022-08-15 RX ADMIN — Medication 4.5 MG: at 22:54

## 2022-08-15 RX ADMIN — AMITRIPTYLINE HYDROCHLORIDE 10 MG: 10 TABLET, FILM COATED ORAL at 09:05

## 2022-08-15 RX ADMIN — EPOETIN ALFA-EPBX 2000 UNITS: 2000 INJECTION, SOLUTION INTRAVENOUS; SUBCUTANEOUS at 09:13

## 2022-08-15 RX ADMIN — GABAPENTIN 300 MG: 300 CAPSULE ORAL at 21:03

## 2022-08-15 RX ADMIN — AMITRIPTYLINE HYDROCHLORIDE 10 MG: 10 TABLET, FILM COATED ORAL at 21:02

## 2022-08-15 RX ADMIN — MOMETASONE FUROATE AND FORMOTEROL FUMARATE DIHYDRATE 2 PUFF: 100; 5 AEROSOL RESPIRATORY (INHALATION) at 10:10

## 2022-08-15 RX ADMIN — POLYETHYLENE GLYCOL 3350 17 G: 17 POWDER, FOR SOLUTION ORAL at 09:12

## 2022-08-15 RX ADMIN — SODIUM CHLORIDE, PRESERVATIVE FREE 10 ML: 5 INJECTION INTRAVENOUS at 21:05

## 2022-08-15 RX ADMIN — EPOETIN ALFA-EPBX 3000 UNITS: 3000 INJECTION, SOLUTION INTRAVENOUS; SUBCUTANEOUS at 09:12

## 2022-08-15 RX ADMIN — DOXEPIN HYDROCHLORIDE 50 MG: 50 CAPSULE ORAL at 21:03

## 2022-08-15 RX ADMIN — MOMETASONE FUROATE AND FORMOTEROL FUMARATE DIHYDRATE 2 PUFF: 100; 5 AEROSOL RESPIRATORY (INHALATION) at 21:11

## 2022-08-15 RX ADMIN — HEPARIN SODIUM 5000 UNITS: 5000 INJECTION INTRAVENOUS; SUBCUTANEOUS at 13:57

## 2022-08-15 RX ADMIN — IRON SUCROSE 200 MG: 20 INJECTION, SOLUTION INTRAVENOUS at 13:57

## 2022-08-15 ASSESSMENT — PAIN DESCRIPTION - LOCATION
LOCATION: BACK
LOCATION: BACK

## 2022-08-15 ASSESSMENT — PAIN SCALES - GENERAL
PAINLEVEL_OUTOF10: 9
PAINLEVEL_OUTOF10: 8

## 2022-08-15 ASSESSMENT — PAIN DESCRIPTION - DESCRIPTORS: DESCRIPTORS: THROBBING

## 2022-08-15 NOTE — PROGRESS NOTES
Internal Medicine Resident Progress Note    Patient:  Uche Cortez    YOB: 1960  Unit/Bed:6K-01/001-A  MRN: [id number]    Acct: [de-identified]   PCP: Lilliana Frias DO    Date of Admission: 8/8/2022      Assessment/Plan:  CAROLE on CKD stage IV  Cr 4.6, baseline 2.0. GFR 13, baseline 60. 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 Patient underwent HD on 8/10, 8/11, and 8/13. Nephrology considering tunneled catheter and scheduled HD. Per nephrology recommendation, if creatinine plateaus then will avoid tunneled dialysis catheter, creatinine rises further tomorrow to go ahead with tunneled catheter placement. ASA held. -Avoid nephrotoxic agents  -Monitor BMP    Hyperkalemia, resolved  K 7.0 on admission. Currently stable 4.1-4.4. Likely from CAROLE and KCl supplements. S/p IV insulin and calcium gluconate in ED. Resolved with dialysis. -Nephrology following  -Monitor BMP    Chronic normocytic anemia  1 unit PRBC transfused on 8/12. Venofer 200 mg daily given 8/11-8/15. Hgb currently stable 7.5-8.3.  -Continue Retacrit  -Monitor CBC  -Transfuse if Hgb < 7    NIDDMII, well controlled  A1c 4.7 on 5/2022. On Amaryl at home. Blood glucose has been well controlled during admission without insulin. No SSI currently.     COPD with chronic hypoxic respiratory failure  Baseline 2 L nasal cannula at home.  -Continue home inhalers    Hypertension, controlled  Continue Coreg and hydralazine    Hypothyroidism  TSH 3.090 on 4/2021  -Continue Synthroid    Obstructive sleep apnea  Continue CPAP    Deconditioning  PT/OT following    Chronic pain  Continue home ropinirole and gabapentin      Expected discharge date: 1-2 days    Disposition:   [x] Home  [] TCU  [] Rehab  [] Psych  [] SNF  [] [place]  [] Other-    ===================================================================      Chief Complaint: Abnormal labs    Hospital Course: Per HPI \"63 y.o. male who presented to [address] with abnormal labs. Patient went to regular scheduled weekly labs drawn and call back from nephrology service about abnormal labs and [address] to [address] Patient complains fatigue which is worse than used to be. Denies chest pain heart palpitations dizziness lightheadedness fever chills productive cough abdominal pain dysuria. In ED, found potassium 7.0, BUN 72, creatinine 6.6, anion gap 16, GFR 9, magnesium 2.5, hemoglobin 8.6, hematocrit 28, MCV 85, troponin 0.0 35  EKG is negative for acute ischemic changes. And admitted for further evaluation and management\"    Neurology consulted and following. Started Cathlene Pill for treatment of hyperkalemia. Developed muscle twitches/spasms. Patient underwent HD on 8/10, 8/11, and 8/13. Tunneled cath planned for 8/15 per nephrology recommendations. Subjective (past 24 hours): Patient was sitting in chair during examination. Patient is that he is feeling a lot better today since admission. He said he is in \"more pain than normal\". Patient denies chest pain or SOB. Patient reports nausea at night with his medications. Per nephrology, patient does not require hemodialysis today. Per nephrology, postpone tunneled catheter placement depending on creatinine in the a.m.    ROS: reviewed complete ROS unchanged unless otherwise stated in hospital course/subjective portion.        Medications:  Reviewed    Infusion Medications    dextrose      sodium chloride       Scheduled Medications    polyethylene glycol  17 g Oral Daily    heparin (porcine)  5,000 Units SubCUTAneous 3 times per day    amitriptyline  10 mg Oral BID    doxepin  50 mg Oral Nightly    gabapentin  300 mg Oral Nightly    hydrALAZINE  25 mg Oral 3 times per day    iron sucrose  200 mg IntraVENous Daily    rOPINIRole  1 mg Oral Nightly    epoetin justin-epbx  2,000 Units SubCUTAneous Once per day on Mon Wed Fri    And content. Capillary Refill: Brisk,< 3 seconds. Peripheral Pulses: +2 palpable, equal bilaterally. Labs:   Recent Labs     08/13/22  0429 08/13/22  1659 08/14/22  0528   WBC 4.9  --  5.2   HGB 7.5* 7.5* 8.3*   HCT 24.4* 24.2* 27.4*     --  206     Recent Labs     08/13/22  0429 08/14/22  0528 08/15/22  0521    138 137   K 4.2 4.1 4.3    101 101   CO2 25 24 24   BUN 31* 26* 29*   CREATININE 4.9* 4.1* 4.6*   CALCIUM 8.8 9.2 8.5     No results for input(s): AST, ALT, BILIDIR, BILITOT, ALKPHOS in the last 72 hours. No results for input(s): INR in the last 72 hours. No results for input(s): Cherre Gash in the last 72 hours. No results for input(s): PROCAL in the last 72 hours. Lab Results   Component Value Date/Time    NITRU NEGATIVE 03/23/2022 04:45 AM    WBCUA 5-9 03/23/2022 04:45 AM    BACTERIA NONE SEEN 03/23/2022 04:45 AM    RBCUA 25-50 03/23/2022 04:45 AM    BLOODU MODERATE 03/23/2022 04:45 AM    SPECGRAV 1.015 03/23/2022 04:45 AM    GLUCOSEU 500 04/13/2021 08:46 AM       Radiology (48 hours):  No results found. DVT prophylaxis:    [] Lovenox  [] SCDs  [x] SQ Heparin  [] Encourage ambulation   [] Already on Anticoagulation       Diet: ADULT DIET; Regular; 4 carb choices (60 gm/meal); Low Sodium (2 gm); Low Potassium (Less than 3000 mg/day);  Low Phosphorus (Less than 1000 mg); 1800 ml  Code Status: Full Code  PT/OT: Following  Tele: Continuous  IVF: None at this time    Electronically signed by Matthias Giordano DO on 8/15/2022 at 5:44 PM    Case was discussed with Attending, Dr. Roberta Marley DO

## 2022-08-15 NOTE — FLOWSHEET NOTE
08/15/22 7105   Safe Environment   Safety Measures Other (comment)  (virtual safety round complete)   Virtual Nurse introduced, pt up in chair, call light in reach, denies needs at this time.

## 2022-08-15 NOTE — PROGRESS NOTES
[facility]   Nephrology progress note  8/15/2022, 9:05 AM      Pt Name:    Andrea Rivas  MRN:     [id number]     YOB: 1960  Admit Date:    8/8/2022  6:24 PM    Chief Complaint: Nephrology following for  CAROLE/CKD .     Subjective:  Patient seen and examined  No chest pain or shortness of breath  No vomiting, diarrhea, headache, cough  Admits to mild nausea last night  Feels okay, would like to be discharged but is agreeable to our plan of care    Objective:  24HR INTAKE/OUTPUT:    Intake/Output Summary (Last 24 hours) at 8/15/2022 0905  Last data filed at 8/15/2022 0820  Gross per 24 hour   Intake 240 ml   Output 1225 ml   Net -985 ml      Admission weight: (!) 329 lb (149.2 kg)  Wt Readings from Last 3 Encounters:   08/15/22 (!) 333 lb 11.2 oz (151.4 kg)   08/04/22 (!) 329 lb (149.2 kg)   07/25/22 (!) 329 lb (149.2 kg)        Vitals :   Vitals:    08/14/22 2328 08/15/22 0340 08/15/22 0643 08/15/22 0830   BP: 126/68 (!) 149/73 (!) 153/88 128/67   Pulse: 65 56 63 67   Resp: 18 18  18   Temp: 98 °F (36.7 °C) 98 °F (36.7 °C)  97.7 °F (36.5 °C)   TempSrc: Oral Oral  Oral   SpO2: 92% 96%  98%   Weight:  (!) 333 lb 11.2 oz (151.4 kg)     Height:           Physical examination  General Appearance: alert and cooperative with exam, appears comfortable, no distress  Mouth/Throat: Oral mucosa moist  Neck: No JVD  Lungs: no distress, no use of accessory muscles, on 2 L nasal cannula  Heart:  S1, S2 heard  GI: soft, non-tender, no guarding, bowel sounds present  Extremities: mild non-pitting LE edema    Medications:  Infusion:    dextrose      sodium chloride       Meds:    polyethylene glycol  17 g Oral Daily    heparin (porcine)  5,000 Units SubCUTAneous 3 times per day    amitriptyline  10 mg Oral BID    doxepin  50 mg Oral Nightly    gabapentin  300 mg Oral Nightly    hydrALAZINE  25 mg Oral 3 times per day    iron sucrose  200 mg IntraVENous Daily    rOPINIRole  1 mg Oral Nightly epoetin justin-epbx  2,000 Units SubCUTAneous Once per day on Mon Wed Fri    And    epoetin justin-epbx  3,000 Units SubCUTAneous Once per day on Mon Wed Fri    sodium chloride flush  5-40 mL IntraVENous 2 times per day    [Held by provider] aspirin EC  81 mg Oral Daily    montelukast  10 mg Oral Nightly    mometasone-formoterol  2 puff Inhalation BID    rOPINIRole  0.5 mg Oral Daily    carvedilol  50 mg Oral BID WC    [Held by provider] tamsulosin  0.4 mg Oral Nightly    levothyroxine  175 mcg Oral Daily    oxybutynin  5 mg Oral Nightly     Meds prn: bisacodyl, albuterol sulfate HFA, doxepin, glucose, dextrose bolus **OR** dextrose bolus, glucagon (rDNA), dextrose, sodium chloride flush, sodium chloride, ondansetron **OR** ondansetron, acetaminophen **OR** acetaminophen, oxyCODONE-acetaminophen, nitroGLYCERIN, melatonin     Lab Data :  CBC:   Recent Labs     08/13/22  0429 08/13/22  1659 08/14/22  0528   WBC 4.9  --  5.2   HGB 7.5* 7.5* 8.3*   HCT 24.4* 24.2* 27.4*     --  206     CMP:  Recent Labs     08/13/22  0429 08/14/22  0528 08/15/22  0521    138 137   K 4.2 4.1 4.3    101 101   CO2 25 24 24   BUN 31* 26* 29*   CREATININE 4.9* 4.1* 4.6*   GLUCOSE 77 86 78   CALCIUM 8.8 9.2 8.5     Hepatic: No results for input(s): LABALBU, AST, ALT, ALB, BILITOT, ALKPHOS in the last 72 hours. Assessment and Plan:  Renal - CAROLE on CKD 4 with underlying diabetic nephropathy. Unclear etiology of novel renal injury, however possibly represents postadaptive secondary FSGS, cardiorenal progression and/or iatrogenic insult. He has got dialysis x2 last week, last dialysis sat 8/13. Intra-dialysis Cr rising again from 4.1 to 4.6. He is making urine.   No emergent need for dialysis, will reevaluate tomorrow to determine need for tue/thur/sat dialysis regimen  Avoid nephrotoxic agents, including diuretics  Daily BMP  Likely the cause of the initial hyperkalemia at admission (though had a hx of hypokalemia previously)  Electrolytes - Hyperkalemia resolved after dialysis, stable at 4.3 this AM  HTN, on carvedilol 50 mg BID and hydralazine 25 mg TID, stable this AM at 128/67  Anemia, will continue retacrit, IR will evaluate to get IV working for iron, holding aspirin  Hx of obesity, hypothyroidism, DM2, CHF (EF 55%), HTN, LAURIE    D/W patient and RN    Arnoldo Breaux, , PGY-1

## 2022-08-15 NOTE — FLOWSHEET NOTE
08/15/22 1427   Safe Environment   Safety Measures Other (comment)  (virtual safety round complete)   Virtual nurse rounds, when audio in, conversation in room not interrupted and camera not turned on.

## 2022-08-15 NOTE — CARE COORDINATION
8/15/22, 9:48 AM EDT    DISCHARGE ON [place] day: 7  Location: [facility]/001-A Reason for admit: Hyperkalemia [E87.5]  Acute renal failure superimposed on stage 5 chronic kidney disease, not on chronic dialysis, unspecified acute renal failure type ([facility] [address] [N17.9, N18.5]   Procedure: 8/10 temp tessio  Barriers to Discharge: Creat 4.6. Nephrology plans to repeat labs tomorrow. If creat worsens they will place tunneled cath and arrange OP HD.   PCP: Rishabh Harrington DO  Readmission Risk Score: 25.5%  Patient Goals/Plan/Treatment Preferences: Home with spouse. Has home oxygen from [facility] (2L at rest, 6L with activity) and home bipap with O2 bleed in. He follows at the CHF clinic. SW following for possible [facility] at discharge.

## 2022-08-15 NOTE — FLOWSHEET NOTE
08/15/22 1008   Safe Environment   Safety Measures Other (comment)  (virtual safety round complete)   Virtual Nurse introduced, pt up in chair, call light in reach, denies needs at this time.

## 2022-08-15 NOTE — PLAN OF CARE
Problem: Respiratory - Adult  Goal: Achieves optimal ventilation and oxygenation  Outcome: Progressing     Problem: Respiratory - Adult  Goal: Clear lung sounds  Outcome: Progressing       Patient mutually agreed on goals.

## 2022-08-15 NOTE — PROGRESS NOTES
[address] RENAL TELEMETRY 6K  Occupational Therapy  Daily Note  Time:   Time In: 8371  Time Out: 6000  Timed Code Treatment Minutes: 24 Minutes  Minutes: 24          Date: 8/15/2022  Patient Name: Reyes Lilly,   Gender: male      Room: [facility]001-A  MRN: [id number]  : 1960  (63 y.o.)  Referring Practitioner: Benigno Clark PA-C  Diagnosis: hypokalemia  Additional Pertinent Hx: Per ER note on 2022:61 y.o. male who presents to the ED for evaluation of abnormal labs. Patient states he had routine lab work done and his nephrologist called him and told him he needed to come to the emergency room because the labs were abnormal.  Patient reports he has been weak and fatigued today. Had to call his wife to help him get from the car into the house after he went and had his labs drawn. s/p Dialysis Catheter insertion on 8/10/2022. Restrictions/Precautions:  Restrictions/Precautions: Fall Risk  Position Activity Restriction  Other position/activity restrictions: 2L O2 on 2022     SUBJECTIVE: RN okayed OT treatment. Pt. In room sitting up in chair easily aroused pleasant and agreeable to participate. PAIN:chronic low back pain did not rate     Vitals: Vitals not assessed per clinical judgement, see nursing flowsheet  Oxygen:2 L O2 sats at 96% reduced to 89% with functional mobility and increased back up to 96% with brief seated rest break. COGNITION: WFL    ADL:   Lower Extremity Dressing: Stand By Assistance, with set-up, and with verbal cues . To don and doff LB clothing with [address] instruction. Cecilia Sim BALANCE:  Sitting Balance:  Stand By Assistance. Standing Balance: Contact Guard Assistance. BED MOBILITY:  Not Tested    TRANSFERS:  Sit to Stand:  Contact Guard Assistance. Stand to Sit: Contact Guard Assistance. FUNCTIONAL MOBILITY:  Assistive Device: Straight Cane  Assist Level:  Contact Guard Assistance.    Distance:  distance in hallway on unit no LOB slow pace       ADDITIONAL ACTIVITIES:  Pt. Instructed on LHAE use and adaptive technqiues in the home to improve independence and safety with ADL components. Pt. Trialed donning and doffing of LB clothing while seated in chair. Pt. Pleased with Iris Linares and would like a set to take home. Notified  and [facility] equipment. ASSESSMENT:     Activity Tolerance:  Patient tolerance of  treatment: good. Discharge Recommendations: Continue to assess pending progress  Equipment Recommendations: Equipment Needed: Yes  Other: Monitor for bariatric RW need  Plan: Times per Week: 5x  Current Treatment Recommendations: Balance training, Self-Care / ADL, Functional mobility training, Endurance training, Patient/Caregiver education & training, Safety education & training    Patient Education  Patient Education: ADL's, Equipment Education, and Assistive Device Safety and safety with functional mobility and transfers. Goals  Short Term Goals  Time Frame for Short term goals: until discharge  Short Term Goal 1: Pt will complete various t/fs including toilet with S & 0-2 vcs for safety  Short Term Goal 2: Pt will tolerate standing 2-3 min with S for increased ease of sinkside grooming  Short Term Goal 3: Pt will complete LE dressing with min A & LH AE prn  Short Term Goal 4: Pt will complete mobility to/from bathroom with RW, S, & 0-2 vcs for safety  Long Term Goals  Time Frame for Long term goals : No LTG set d/t short ELOS    Following session, patient left in safe position with all fall risk precautions in place.

## 2022-08-16 DIAGNOSIS — G89.4 CHRONIC PAIN SYNDROME: ICD-10-CM

## 2022-08-16 LAB
ANION GAP SERPL CALCULATED.3IONS-SCNC: 11 MEQ/L (ref 8–16)
BUN BLDV-MCNC: 35 MG/DL (ref 7–22)
CALCIUM SERPL-MCNC: 8.5 MG/DL (ref 8.5–10.5)
CHLORIDE BLD-SCNC: 103 MEQ/L (ref 98–111)
CO2: 25 MEQ/L (ref 23–33)
CREAT SERPL-MCNC: 5 MG/DL (ref 0.4–1.2)
ERYTHROCYTE [DISTWIDTH] IN BLOOD BY AUTOMATED COUNT: 17.4 % (ref 11.5–14.5)
ERYTHROCYTE [DISTWIDTH] IN BLOOD BY AUTOMATED COUNT: 54.7 FL (ref 35–45)
GFR SERPL CREATININE-BSD FRML MDRD: 12 ML/MIN/1.73M2
GLUCOSE BLD-MCNC: 120 MG/DL (ref 70–108)
GLUCOSE BLD-MCNC: 123 MG/DL (ref 70–108)
GLUCOSE BLD-MCNC: 80 MG/DL (ref 70–108)
GLUCOSE BLD-MCNC: 87 MG/DL (ref 70–108)
HCT VFR BLD CALC: 24.5 % (ref 42–52)
HCT VFR BLD CALC: 25.7 % (ref 42–52)
HEMOGLOBIN: 7.3 GM/DL (ref 14–18)
HEMOGLOBIN: 7.9 GM/DL (ref 14–18)
MCH RBC QN AUTO: 25.8 PG (ref 26–33)
MCHC RBC AUTO-ENTMCNC: 29.8 GM/DL (ref 32.2–35.5)
MCV RBC AUTO: 86.6 FL (ref 80–94)
PLATELET # BLD: 193 THOU/MM3 (ref 130–400)
PMV BLD AUTO: 8.7 FL (ref 9.4–12.4)
POTASSIUM SERPL-SCNC: 4.5 MEQ/L (ref 3.5–5.2)
RBC # BLD: 2.83 MILL/MM3 (ref 4.7–6.1)
SODIUM BLD-SCNC: 139 MEQ/L (ref 135–145)
WBC # BLD: 4.6 THOU/MM3 (ref 4.8–10.8)

## 2022-08-16 PROCEDURE — 99232 SBSQ HOSP IP/OBS MODERATE 35: CPT | Performed by: INTERNAL MEDICINE

## 2022-08-16 PROCEDURE — 82948 REAGENT STRIP/BLOOD GLUCOSE: CPT

## 2022-08-16 PROCEDURE — 94640 AIRWAY INHALATION TREATMENT: CPT

## 2022-08-16 PROCEDURE — 97110 THERAPEUTIC EXERCISES: CPT

## 2022-08-16 PROCEDURE — 6370000000 HC RX 637 (ALT 250 FOR IP)

## 2022-08-16 PROCEDURE — 85027 COMPLETE CBC AUTOMATED: CPT

## 2022-08-16 PROCEDURE — 6360000002 HC RX W HCPCS: Performed by: INTERNAL MEDICINE

## 2022-08-16 PROCEDURE — 6370000000 HC RX 637 (ALT 250 FOR IP): Performed by: INTERNAL MEDICINE

## 2022-08-16 PROCEDURE — 80048 BASIC METABOLIC PNL TOTAL CA: CPT

## 2022-08-16 PROCEDURE — 2580000003 HC RX 258

## 2022-08-16 PROCEDURE — 1200000003 HC TELEMETRY R&B

## 2022-08-16 PROCEDURE — 93005 ELECTROCARDIOGRAM TRACING: CPT | Performed by: INTERNAL MEDICINE

## 2022-08-16 PROCEDURE — 36415 COLL VENOUS BLD VENIPUNCTURE: CPT

## 2022-08-16 PROCEDURE — 85018 HEMOGLOBIN: CPT

## 2022-08-16 PROCEDURE — 85014 HEMATOCRIT: CPT

## 2022-08-16 PROCEDURE — 97530 THERAPEUTIC ACTIVITIES: CPT

## 2022-08-16 PROCEDURE — 97116 GAIT TRAINING THERAPY: CPT

## 2022-08-16 PROCEDURE — 90935 HEMODIALYSIS ONE EVALUATION: CPT

## 2022-08-16 RX ORDER — CLINDAMYCIN PHOSPHATE 600 MG/50ML
600 INJECTION INTRAVENOUS
Status: COMPLETED | OUTPATIENT
Start: 2022-08-17 | End: 2022-08-17

## 2022-08-16 RX ORDER — HYDRALAZINE HYDROCHLORIDE 50 MG/1
50 TABLET, FILM COATED ORAL EVERY 8 HOURS SCHEDULED
Status: DISCONTINUED | OUTPATIENT
Start: 2022-08-16 | End: 2022-08-17 | Stop reason: HOSPADM

## 2022-08-16 RX ORDER — CARVEDILOL 25 MG/1
25 TABLET ORAL 2 TIMES DAILY WITH MEALS
Status: DISCONTINUED | OUTPATIENT
Start: 2022-08-16 | End: 2022-08-17 | Stop reason: HOSPADM

## 2022-08-16 RX ADMIN — OXYCODONE AND ACETAMINOPHEN 1 TABLET: 10; 325 TABLET ORAL at 22:18

## 2022-08-16 RX ADMIN — OXYBUTYNIN CHLORIDE 5 MG: 5 TABLET, EXTENDED RELEASE ORAL at 22:21

## 2022-08-16 RX ADMIN — Medication 4.5 MG: at 22:18

## 2022-08-16 RX ADMIN — GABAPENTIN 300 MG: 300 CAPSULE ORAL at 22:19

## 2022-08-16 RX ADMIN — HYDRALAZINE HYDROCHLORIDE 50 MG: 50 TABLET, FILM COATED ORAL at 22:18

## 2022-08-16 RX ADMIN — LEVOTHYROXINE SODIUM 175 MCG: 0.03 TABLET ORAL at 06:12

## 2022-08-16 RX ADMIN — ROPINIROLE HYDROCHLORIDE 1 MG: 1 TABLET, FILM COATED ORAL at 22:20

## 2022-08-16 RX ADMIN — AMITRIPTYLINE HYDROCHLORIDE 10 MG: 10 TABLET, FILM COATED ORAL at 22:20

## 2022-08-16 RX ADMIN — AMITRIPTYLINE HYDROCHLORIDE 10 MG: 10 TABLET, FILM COATED ORAL at 14:11

## 2022-08-16 RX ADMIN — MOMETASONE FUROATE AND FORMOTEROL FUMARATE DIHYDRATE 2 PUFF: 100; 5 AEROSOL RESPIRATORY (INHALATION) at 17:49

## 2022-08-16 RX ADMIN — DOXEPIN HYDROCHLORIDE 50 MG: 50 CAPSULE ORAL at 22:18

## 2022-08-16 RX ADMIN — HEPARIN SODIUM 5000 UNITS: 5000 INJECTION INTRAVENOUS; SUBCUTANEOUS at 06:06

## 2022-08-16 RX ADMIN — OXYCODONE AND ACETAMINOPHEN 1 TABLET: 10; 325 TABLET ORAL at 14:12

## 2022-08-16 RX ADMIN — SODIUM CHLORIDE, PRESERVATIVE FREE 10 ML: 5 INJECTION INTRAVENOUS at 22:24

## 2022-08-16 RX ADMIN — POLYETHYLENE GLYCOL 3350 17 G: 17 POWDER, FOR SOLUTION ORAL at 14:12

## 2022-08-16 RX ADMIN — ONDANSETRON 4 MG: 4 TABLET, ORALLY DISINTEGRATING ORAL at 01:41

## 2022-08-16 RX ADMIN — CARVEDILOL 50 MG: 25 TABLET, FILM COATED ORAL at 14:11

## 2022-08-16 RX ADMIN — HYDRALAZINE HYDROCHLORIDE 25 MG: 25 TABLET, FILM COATED ORAL at 06:06

## 2022-08-16 RX ADMIN — HYDRALAZINE HYDROCHLORIDE 25 MG: 25 TABLET, FILM COATED ORAL at 14:12

## 2022-08-16 RX ADMIN — ROPINIROLE HYDROCHLORIDE 0.5 MG: 1 TABLET, FILM COATED ORAL at 14:12

## 2022-08-16 RX ADMIN — MONTELUKAST SODIUM 10 MG: 10 TABLET ORAL at 22:21

## 2022-08-16 RX ADMIN — SODIUM CHLORIDE, PRESERVATIVE FREE 10 ML: 5 INJECTION INTRAVENOUS at 14:17

## 2022-08-16 ASSESSMENT — PAIN DESCRIPTION - DESCRIPTORS
DESCRIPTORS: ACHING;DISCOMFORT
DESCRIPTORS: THROBBING

## 2022-08-16 ASSESSMENT — PAIN DESCRIPTION - ORIENTATION
ORIENTATION: LOWER;MID
ORIENTATION: LOWER

## 2022-08-16 ASSESSMENT — PAIN SCALES - GENERAL
PAINLEVEL_OUTOF10: 9
PAINLEVEL_OUTOF10: 6

## 2022-08-16 ASSESSMENT — PAIN DESCRIPTION - LOCATION
LOCATION: BACK
LOCATION: BACK

## 2022-08-16 ASSESSMENT — PAIN DESCRIPTION - ONSET: ONSET: ON-GOING

## 2022-08-16 ASSESSMENT — PAIN DESCRIPTION - FREQUENCY: FREQUENCY: CONTINUOUS

## 2022-08-16 ASSESSMENT — PAIN DESCRIPTION - PAIN TYPE: TYPE: CHRONIC PAIN

## 2022-08-16 NOTE — PLAN OF CARE
Problem: Pain  Goal: Verbalizes/displays adequate comfort level or baseline comfort level  Outcome: Progressing  Flowsheets (Taken 8/15/2022 2312)  Verbalizes/displays adequate comfort level or baseline comfort level:   Encourage patient to monitor pain and request assistance   Assess pain using appropriate pain scale   Administer analgesics based on type and severity of pain and evaluate response     Problem: Safety - Adult  Goal: Free from fall injury  Outcome: Progressing  Flowsheets (Taken 8/14/2022 0200 by Car Chairez RN)  Free From Fall Injury: Instruct family/caregiver on patient safety     Problem: Genitourinary - Adult  Goal: Absence of urinary retention  Outcome: Progressing  Flowsheets (Taken 8/15/2022 2057)  Absence of urinary retention:   Assess patients ability to void and empty bladder   Monitor intake/output and perform bladder scan as needed

## 2022-08-16 NOTE — PLAN OF CARE
Problem: Discharge Planning  Goal: Discharge to home or other facility with appropriate resources  Outcome: Progressing  Flowsheets (Taken 8/15/2022 0830 by Phoenix Brewer RN)  Discharge to home or other facility with appropriate resources: Identify barriers to discharge with patient and caregiver     Problem: Pain  Goal: Verbalizes/displays adequate comfort level or baseline comfort level  Flowsheets (Taken 8/16/2022 1802)  Verbalizes/displays adequate comfort level or baseline comfort level:   Encourage patient to monitor pain and request assistance   Assess pain using appropriate pain scale   Administer analgesics based on type and severity of pain and evaluate response     Problem: Chronic Conditions and Co-morbidities  Goal: Patient's chronic conditions and co-morbidity symptoms are monitored and maintained or improved  Flowsheets (Taken 8/16/2022 1802)  Care Plan - Patient's Chronic Conditions and Co-Morbidity Symptoms are Monitored and Maintained or Improved:   Monitor and assess patient's chronic conditions and comorbid symptoms for stability, deterioration, or improvement   Collaborate with multidisciplinary team to address chronic and comorbid conditions and prevent exacerbation or deterioration     Problem: ABCDS Injury Assessment  Goal: Absence of physical injury  Flowsheets (Taken 8/16/2022 1802)  Absence of Physical Injury: Implement safety measures based on patient assessment   Care plan reviewed with patient. Patient verbalizes understanding of the plan of care and contributes to goal setting.

## 2022-08-16 NOTE — PROGRESS NOTES
Internal Medicine Resident Progress Note    Patient:  Sukhdeep Haywood    YOB: 1960  Unit/Bed:6K-01/001-A  MRN: [id number]    Acct: [de-identified]   PCP: Edwige Pittman DO    Date of Admission: 8/8/2022      Assessment/Plan:  CAROLE on CKD stage IV  Cr 4.6, baseline 2.0. GFR 13, baseline 60. 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 Patient underwent HD on 8/10, 8/11, 8/13, and 8/16. Due to rising creatinine, nephrology planning on tunneled catheter placement tomorrow 8/17 by IR. ASA held. -Avoid nephrotoxic agents  -Monitor BMP    Hyperkalemia, resolved  K 7.0 on admission. Currently stable 4.1-4.4. Likely from CAROLE and KCl supplements. S/p IV insulin and calcium gluconate in ED. Resolved with dialysis. -Nephrology following  -Monitor BMP    Chronic normocytic anemia  1 unit PRBC transfused on 8/12. Venofer 200 mg daily given 8/11-8/15. Hgb currently stable 7.5-8.3.  -Continue Retacrit  -Monitor CBC  -Transfuse if Hgb < 7    NIDDMII, well controlled  A1c 4.7 on 5/2022. On Amaryl at home. Blood glucose has been well controlled during admission without insulin. No SSI currently. COPD with chronic hypoxic respiratory failure  Baseline 2 L nasal cannula at home.  -Continue home inhalers    Hypertension, controlled  Continue Coreg and hydralazine    Hypothyroidism  TSH 3.090 on 4/2021  -Continue Synthroid    Obstructive sleep apnea  Continue CPAP    Deconditioning  PT/OT following    Chronic pain  Continue home ropinirole and gabapentin      Expected discharge date: 1-2 days    Disposition:   [x] Home  [] TCU  [] Rehab  [] Psych  [] SNF  [] [place]  [] Other-    ===================================================================      Chief Complaint: Abnormal labs    Hospital Course: Per HPI \"63 y.o. male who presented to [facility] with abnormal labs.   Patient went to regular scheduled weekly labs drawn and call back from nephrology service about abnormal labs and sent to ED [address] Patient complains fatigue which is worse than used to be. Denies chest pain heart palpitations dizziness lightheadedness fever chills productive cough abdominal pain dysuria. In ED, found potassium 7.0, BUN 72, creatinine 6.6, anion gap 16, GFR 9, magnesium 2.5, hemoglobin 8.6, hematocrit 28, MCV 85, troponin 0.0 35  EKG is negative for acute ischemic changes. And admitted for further evaluation and management\"    Neurology consulted and following. Started Sterling Rana for treatment of hyperkalemia. Developed muscle twitches/spasms. Patient underwent HD on 8/10, 8/11, and 8/13. Tunneled cath planned for 8/15 postponed per nephrology recommendations. 8/15: Per nephrology, patient does not require hemodialysis today. Per nephrology, postpone tunneled catheter placement depending on creatinine in the a.m. Subjective (past 24 hours): Patient was sitting in chair during examination. Patient denies chest pain or SOB. Patient was not on nasal cannula during examination. Patient states that he is normally on 2 L nasal cannula at home, however he states that he is able to breathe on room air while sitting and has a nasal cannula within reach if needed. Patient underwent HD today and states that it went well. Per nephrology recommendations, patient is scheduled for tunneled catheter placement tomorrow, 8/17. ROS: reviewed complete ROS unchanged unless otherwise stated in hospital course/subjective portion.        Medications:  Reviewed    Infusion Medications    dextrose      sodium chloride       Scheduled Medications    [START ON 8/17/2022] clindamycin (CLEOCIN) IV  600 mg IntraVENous 60 Min Pre-Op    carvedilol  25 mg Oral BID     hydrALAZINE  50 mg Oral 3 times per day    polyethylene glycol  17 g Oral Daily    heparin (porcine)  5,000 Units SubCUTAneous 3 times per day    amitriptyline  10 mg Oral BID    doxepin  50 mg Oral Nightly    gabapentin  300 mg Oral Nightly    rOPINIRole  1 mg Oral Nightly    epoetin justin-epbx  2,000 Units SubCUTAneous Once per day on Mon Wed Fri    And    epoetin justin-epbx  3,000 Units SubCUTAneous Once per day on Mon Wed Fri    sodium chloride flush  5-40 mL IntraVENous 2 times per day    [Held by provider] aspirin EC  81 mg Oral Daily    montelukast  10 mg Oral Nightly    mometasone-formoterol  2 puff Inhalation BID    rOPINIRole  0.5 mg Oral Daily    [Held by provider] tamsulosin  0.4 mg Oral Nightly    levothyroxine  175 mcg Oral Daily    oxybutynin  5 mg Oral Nightly     PRN Meds: bisacodyl, albuterol sulfate HFA, doxepin, glucose, dextrose bolus **OR** dextrose bolus, glucagon (rDNA), dextrose, sodium chloride flush, sodium chloride, ondansetron **OR** ondansetron, acetaminophen **OR** acetaminophen, oxyCODONE-acetaminophen, nitroGLYCERIN, melatonin        Intake/Output Summary (Last 24 hours) at 8/16/2022 1640  Last data filed at 8/16/2022 1518  Gross per 24 hour   Intake 1140 ml   Output 2650 ml   Net -1510 ml       Exam:  BP (!) 147/89   Pulse 61   Temp 98.3 °F (36.8 °C) (Oral)   Resp 20   Ht 6' 1\" (1.854 m)   Wt (!) 325 lb 13.4 oz (147.8 kg)   SpO2 97%   BMI 42.99 kg/m²     General: No distress, appears stated age. Chronically ill-appearing  Eyes:  PERRL. Conjunctivae/corneas clear. HENT: Head normal appearing. Nares normal. Oral mucosa moist.  Hearing intact. Neck: Supple, with full range of motion. Trachea midline. No gross JVD appreciated. Respiratory:  Normal effort. Clear to auscultation, without rales or wheezes or rhonchi. Cardiovascular: Normal rate, regular rhythm with normal S1/S2 without murmurs. No lower extremity edema. Abdomen: Soft, non-tender, non-distended with normal bowel sounds. Musculoskeletal: No joint swelling or tenderness. Normal tone. No abnormal movements. Skin: Warm and dry. No rashes or lesions.   Bilateral lower extremity nonpitting edema  Neurologic:  No focal sensory/motor deficits in the upper or lower extremities. Psychiatric: Alert and oriented, normal insight and thought content. Capillary Refill: Brisk,< 3 seconds. Peripheral Pulses: +2 palpable, equal bilaterally. Labs:   Recent Labs     08/13/22  1659 08/14/22  0528 08/16/22  0352   WBC  --  5.2 4.6*   HGB 7.5* 8.3* 7.3*   HCT 24.2* 27.4* 24.5*   PLT  --  206 193     Recent Labs     08/14/22  0528 08/15/22  0521 08/16/22  0352    137 139   K 4.1 4.3 4.5    101 103   CO2 24 24 25   BUN 26* 29* 35*   CREATININE 4.1* 4.6* 5.0*   CALCIUM 9.2 8.5 8.5     No results for input(s): AST, ALT, BILIDIR, BILITOT, ALKPHOS in the last 72 hours. No results for input(s): INR in the last 72 hours. No results for input(s): [place] in the last 72 hours. No results for input(s): PROCAL in the last 72 hours. Lab Results   Component Value Date/Time    NITRU NEGATIVE 03/23/2022 04:45 AM    WBCUA 5-9 03/23/2022 04:45 AM    BACTERIA NONE SEEN 03/23/2022 04:45 AM    RBCUA 25-50 03/23/2022 04:45 AM    BLOODU MODERATE 03/23/2022 04:45 AM    SPECGRAV 1.015 03/23/2022 04:45 AM    GLUCOSEU 500 04/13/2021 08:46 AM       Radiology (48 hours):  No results found. DVT prophylaxis:    [] Lovenox  [] SCDs  [x] SQ Heparin  [] Encourage ambulation   [] Already on Anticoagulation       Diet: ADULT DIET; Regular; 4 carb choices (60 gm/meal); Low Sodium (2 gm); Low Potassium (Less than 3000 mg/day);  Low Phosphorus (Less than 1000 mg); 1800 ml  Diet NPO Exceptions are: Sips of Water with Meds  Code Status: Full Code  PT/OT: Following  Tele: Continuous  IVF: None at this time    Electronically signed by Rosario Saravia DO on 8/16/2022 at 4:40 PM    Case was discussed with Attending, Dr. Jl Edmond DO

## 2022-08-16 NOTE — CARE COORDINATION
8/16/22, 11:56 AM EDT    DISCHARGE ON [place] day: 8  Location: [facility]01/001-A Reason for admit: Hyperkalemia [E87.5]  Acute renal failure superimposed on stage 5 chronic kidney disease, not on chronic dialysis, unspecified acute renal failure type ([facility] [address] [N17.9, N18.5]   Barriers to Discharge: Tunneled HD cath ordered. Nephro has ordered arrangement of OP HD, referral sent to [facility]. PCP: Lilliana Frias DO  Readmission Risk Score: 25.9%  Patient Goals/Plan/Treatment Preferences: Home with wife. Has home oxygen from [facility]. Await OP HD placement. Statement Selected

## 2022-08-16 NOTE — FLOWSHEET NOTE
Pt sitting in recliner with legs elevated. Pt c/o back pain 8/10 on pain scale. Pain rx taken a little after 2pm. Pt refuses tylenol stating it does nothing for the pain. Pt is using a heating pad at this time with some relief voiced. Call light within relief. No safety concerns identified.

## 2022-08-16 NOTE — FLOWSHEET NOTE
Stable 3.5 hour TX completed. Removed 1 L of fluid, pt tolerated fluid removal well. Bilateral cath ports flushed with normal saline and clamped. CVC drsg clean, dry, and intact. Report called to primary RN. TX record printed for scanning into EMR.   08/16/22 0759 08/16/22 3375   Vital Signs   /75 (!) 140/70   Temp 97.9 °F (36.6 °C) 97.7 °F (36.5 °C)   Heart Rate 58 60   Resp 17 18   SpO2 99 % 98 %   Weight (!) 328 lb 1.6 oz (148.8 kg) (!) 325 lb 13.4 oz (147.8 kg)   Post-Hemodialysis Assessment   Post-Treatment Procedures  --  Blood returned;Catheter Capped, clamped with Saline x2 ports   Machine Disinfection Process  --  Acid/Vinegar Clean;Heat Disinfect; Exterior Machine Disinfection   Blood Volume Processed (Liters)  --  45.6 l/min   Dialyzer Clearance  --  Lightly streaked   Duration of Treatment (minutes)  --  210 minutes   Heparin Amount Administered During Treatment (mL)  --  0 mL   Hemodialysis Intake (ml)  --  400 ml   Hemodialysis Output (ml)  --  1400 ml   NET Removed (ml)  --  1000   Tolerated Treatment  --  Good

## 2022-08-16 NOTE — PROGRESS NOTES
Patient appears to have possibly flipped into aflutter at shift change. Seems to be going in and out of it. EKG order placed. Night Brent Morataya states she will update primary team once EKG is obtained.

## 2022-08-16 NOTE — PROGRESS NOTES
[facility]   Nephrology progress note  8/16/2022, 9:07 AM      Pt Name:    Uche Cortez  MRN:     [id number]     [place]:    1960  Admit Date:    8/8/2022  6:24 PM    Chief Complaint: Nephrology following for CAROLE/CKD    Subjective:  Patient seen and examined  No chest pain or shortness of breath  Seen in dialysis unit  Feels okay, want to be discharged but cooperative with staff/plan    Objective:  24HR INTAKE/OUTPUT:    Intake/Output Summary (Last 24 hours) at 8/16/2022 0907  Last data filed at 8/16/2022 0610  Gross per 24 hour   Intake 600 ml   Output 1250 ml   Net -650 ml    Admission weight: (!) 329 lb (149.2 kg)  Wt Readings from Last 3 Encounters:   08/16/22 (!) 328 lb 1.6 oz (148.8 kg)   08/04/22 (!) 329 lb (149.2 kg)   07/25/22 (!) 329 lb (149.2 kg)        Vitals :   Vitals:    08/16/22 0348 08/16/22 0600 08/16/22 0606 08/16/22 0759   BP: 125/68  126/73 138/75   Pulse: 54   58   Resp: 20   17   Temp: 97.4 °F (36.3 °C)   97.9 °F (36.6 °C)   TempSrc: Axillary      SpO2: 99%   99%   Weight: (!) 325 lb 11.2 oz (147.7 kg) (!) 325 lb (147.4 kg)  (!) 328 lb 1.6 oz (148.8 kg)   Height:           Physical examination  General Appearance: obese, alert and cooperative with exam, appears comfortable, no distress, in dialysis  Mouth/Throat: Oral mucosa moist  Neck: No JVD  Lungs: Air entry B/L, no rales, no use of accessory muscles  Heart:  S1, S2 heard  GI: soft, non-tender, no guarding  Extremities: no significant LE edema    Medications:  Infusion:    dextrose      sodium chloride       Meds:    polyethylene glycol  17 g Oral Daily    heparin (porcine)  5,000 Units SubCUTAneous 3 times per day    amitriptyline  10 mg Oral BID    doxepin  50 mg Oral Nightly    gabapentin  300 mg Oral Nightly    hydrALAZINE  25 mg Oral 3 times per day    rOPINIRole  1 mg Oral Nightly    epoetin justin-epbx  2,000 Units SubCUTAneous Once per day on Mon Wed Fri    And    epoetin justin-epbx  3,000 Units SubCUTAneous Once per day on Mon Wed Fri    sodium chloride flush  5-40 mL IntraVENous 2 times per day    [Held by provider] aspirin EC  81 mg Oral Daily    montelukast  10 mg Oral Nightly    mometasone-formoterol  2 puff Inhalation BID    rOPINIRole  0.5 mg Oral Daily    carvedilol  50 mg Oral BID     [Held by provider] tamsulosin  0.4 mg Oral Nightly    levothyroxine  175 mcg Oral Daily    oxybutynin  5 mg Oral Nightly     Meds prn: bisacodyl, albuterol sulfate HFA, doxepin, glucose, dextrose bolus **OR** dextrose bolus, glucagon (rDNA), dextrose, sodium chloride flush, sodium chloride, ondansetron **OR** ondansetron, acetaminophen **OR** acetaminophen, oxyCODONE-acetaminophen, nitroGLYCERIN, melatonin     Lab Data :  CBC:   Recent Labs     08/13/22  1659 08/14/22  0528 08/16/22  0352   WBC  --  5.2 4.6*   HGB 7.5* 8.3* 7.3*   HCT 24.2* 27.4* 24.5*   PLT  --  206 193     CMP:  Recent Labs     08/14/22  0528 08/15/22  0521 08/16/22  0352    137 139   K 4.1 4.3 4.5    101 103   CO2 24 24 25   BUN 26* 29* 35*   CREATININE 4.1* 4.6* 5.0*   GLUCOSE 86 78 80   CALCIUM 9.2 8.5 8.5     Hepatic: No results for input(s): LABALBU, AST, ALT, ALB, BILITOT, ALKPHOS in the last 72 hours. Assessment and Plan:  Renal - CAROLE on CKD 4 with underlying diabetic nephropathy. Unclear etiology of novel renal injury, however possibly represents postadaptive secondary FSGS, cardiorenal progression and/or iatrogenic insult. He has got dialysis x2 last week, and is now getting today. His intra-dialysis Cr senthil to 5 before dialysis today.   Dialysis today  Plan for discharge on tue/thur/sat dialysis regimen going forward until/unless CAROLE significantly improves  Schedule more permanent access for dialysis  Avoid nephrotoxic agents, including diuretics  Daily BMP  Likely the cause of the initial hyperkalemia at admission (though had a hx of hypokalemia previously)  Electrolytes WNL - Hyperkalemia stable  HTN, on carvedilol 50 mg BID and hydralazine 25 mg TID, elevated this AM at 138/75, and similarly elevated when examined in dialysis unit  Anemia, dropped from 8.3 to 7.3 this Am, recheck after dialysis/PM, will continue retacrit, IR will evaluate to get IV working for iron, holding aspirin  Hx of obesity, hypothyroidism, DM2, CHF (EF 55%), HTN, LAURIE    D/W patient and RN    Frandy June DO, PGY-1

## 2022-08-16 NOTE — PROGRESS NOTES
[facility] [place]  OCCUPATIONAL THERAPY MISSED TREATMENT NOTE  [facility] RENAL TELEMETRY 6K  6K-01/001-A      Date: 2022  Patient Name: Maria Esther Kothari        CSN: [id number]   : 1960  (63 y.o.)  Gender: male   Referring Practitioner: Asya Gupta PA-C  Diagnosis: hypokalemia         REASON FOR MISSED TREATMENT: Patient Off Floor for Dialysis. Will re attempt as time allows.

## 2022-08-16 NOTE — PROGRESS NOTES
[facility]  INPATIENT PHYSICAL THERAPY  DAILY NOTE  [facility] RENAL TELEMETRY 6K - 6K-01001-A     Time In: 1406  Time Out: 1449  Timed Code Treatment Minutes: 37 Minutes  Minutes: 43          Date: 2022  Patient Name: Nilsa Grady,  Gender:  male        MRN: [id number]  : 1960  (63 y.o.)     Referring Practitioner: Jeri Flores PA-C  Diagnosis: Hyperkalemia  Additional Pertinent Hx: 63 y.o. male who presented to [facility] with abnormal labs. Patient went to regular scheduled weekly labs drawn and call back from nephrology service about abnormal labs and sent to ED [facility]. Patient complains fatigue which is worse than used to be. Denies chest pain heart palpitations dizziness lightheadedness fever chills productive cough abdominal pain dysuria. In ED, found potassium 7.0, BUN 72, creatinine 6.6, anion gap 16, GFR 9, magnesium 2.5, hemoglobin 8.6, hematocrit 28, MCV 85, troponin 0.0 35  EKG is negative for acute ischemic changes. Prior Level of Function:  Lives With: Spouse  Type of Home: House  Home Layout: Work area in basement, Two level, Performs ADL's on one level (bedroom is upstairs but pt has been sleeping in recliner on main floor)  Home Access: Stairs to enter with rails (4)  Entrance Stairs - Rails: Both  Home Equipment: Cane, quad, Oxygen (2-4L O2 at home)   Bathroom Shower/Tub: Walk-in shower  Bathroom Toilet: Standard    ADL Assistance: Independent  Ambulation Assistance: Independent  Transfer Assistance: Independent  Additional Comments: Pt reports using no AD at home, quad cane for community distance. Has been sleeping in recliner on 1st level, has bathroom on 1st level-\"man cave\" in basement. Pt reports falling back into recliner x 2 recently d/t being dizzy headed. 2-4L O2 all the time at home.     Restrictions/Precautions:  Restrictions/Precautions: Fall Risk  Position Activity Restriction  Other position/activity restrictions: 2L O2 on 8/11/2022      SUBJECTIVE: Pt sitting up in recliner and agrees to therapy. RN approved session and is preparing his afternoon meds at start of session. PAIN: 6/10: low back and buttocks on L side    Vitals: Vitals not assessed per clinical judgement, see nursing flowsheet    OBJECTIVE:  Bed Mobility:  Not Tested    Transfers:  Sit to Stand: Stand By Assistance  Stand to Sit:Stand By Assistance    Ambulation:  Supervision, Stand By Assistance  Distance: 140 ft x 2  Surface: Level Tile and Carpet  Device:Cane  Gait Deviations:  Decreased Step Length Bilaterally, Decreased Gait Speed, and O2 at 2 L throughout with saturations noted at 97% during 2nd distance. Balance:  Static Sitting Balance:  Independent  Dynamic Sitting Balance: Modified Independent  Static Standing Balance: Stand By Assistance  Dynamic Standing Balance: Stand By Assistance    Exercise:  Patient was guided in 1 set(s) 10 reps of exercise to both lower extremities. Ankle pumps, Glut sets, Quad sets, Hip abduction/adduction, Seated marches, and Long arc quads. Exercises were completed for increased independence with functional mobility. Functional Outcome Measures: Completed  AM-PAC Inpatient Mobility Raw Score : 18  AM-PAC Inpatient T-Scale Score : 43.63    ASSESSMENT:  Assessment: Patient progressing toward established goals. and tolerating household distances while maintaining O2 saturations. Activity Tolerance:  Patient tolerance of  treatment: good.        Equipment Recommendations:Equipment Needed: No  Discharge Recommendations: Continue to assess pending progress, Home with Assist as Needed, Home with Home Health PT, and Patient would benefit from continued PT at discharge  Plan: Current Treatment Recommendations: Strengthening, Balance training, Endurance training, Functional mobility training, Gait training, Stair training  Plan:  (5x GM)    Patient Education  Patient Education: Plan of Care, Home Exercise Program    Goals:  Patient goals : be able to go back upstairs  Short Term Goals  Time Frame for Short term goals: by discharge  Short term goal 1: bed mobility with HOB flat, no rails, mod I for increased functional ind  Short term goal 2: sit<>stand from various surfaces with LRD mod I for safe transfers  Short term goal 3: abmulate 80' with LRD mod I for safe household distances  Short term goal 4: navigate 4 steps with LRD mod I for safe enter/exit of home  Long Term Goals  Time Frame for Long term goals : NA d/t short ELOS    Following session, patient left in safe position with all fall risk precautions in place. Sofia Hernandez.  Ajay Sanabria [place] [address]

## 2022-08-17 ENCOUNTER — APPOINTMENT (OUTPATIENT)
Dept: INTERVENTIONAL RADIOLOGY/VASCULAR | Age: 62
DRG: 674 | End: 2022-08-17
Payer: MEDICARE

## 2022-08-17 VITALS
TEMPERATURE: 98.2 F | HEIGHT: 73 IN | BODY MASS INDEX: 41.75 KG/M2 | SYSTOLIC BLOOD PRESSURE: 158 MMHG | DIASTOLIC BLOOD PRESSURE: 89 MMHG | HEART RATE: 70 BPM | WEIGHT: 315 LBS | RESPIRATION RATE: 18 BRPM | OXYGEN SATURATION: 96 %

## 2022-08-17 LAB
ANION GAP SERPL CALCULATED.3IONS-SCNC: 10 MEQ/L (ref 8–16)
BUN BLDV-MCNC: 28 MG/DL (ref 7–22)
CALCIUM SERPL-MCNC: 8.9 MG/DL (ref 8.5–10.5)
CHLORIDE BLD-SCNC: 102 MEQ/L (ref 98–111)
CO2: 26 MEQ/L (ref 23–33)
CREAT SERPL-MCNC: 3.7 MG/DL (ref 0.4–1.2)
EKG ATRIAL RATE: 64 BPM
EKG P AXIS: 47 DEGREES
EKG P-R INTERVAL: 186 MS
EKG Q-T INTERVAL: 430 MS
EKG QRS DURATION: 114 MS
EKG QTC CALCULATION (BAZETT): 443 MS
EKG R AXIS: 5 DEGREES
EKG T AXIS: 27 DEGREES
EKG VENTRICULAR RATE: 64 BPM
ERYTHROCYTE [DISTWIDTH] IN BLOOD BY AUTOMATED COUNT: 17.3 % (ref 11.5–14.5)
ERYTHROCYTE [DISTWIDTH] IN BLOOD BY AUTOMATED COUNT: 54.4 FL (ref 35–45)
GFR SERPL CREATININE-BSD FRML MDRD: 17 ML/MIN/1.73M2
GLUCOSE BLD-MCNC: 103 MG/DL (ref 70–108)
GLUCOSE BLD-MCNC: 78 MG/DL (ref 70–108)
GLUCOSE BLD-MCNC: 84 MG/DL (ref 70–108)
GLUCOSE BLD-MCNC: 91 MG/DL (ref 70–108)
HCT VFR BLD CALC: 24.6 % (ref 42–52)
HEMOGLOBIN: 7.5 GM/DL (ref 14–18)
MCH RBC QN AUTO: 26.1 PG (ref 26–33)
MCHC RBC AUTO-ENTMCNC: 30.5 GM/DL (ref 32.2–35.5)
MCV RBC AUTO: 85.7 FL (ref 80–94)
PLATELET # BLD: 206 THOU/MM3 (ref 130–400)
PMV BLD AUTO: 8.5 FL (ref 9.4–12.4)
POTASSIUM SERPL-SCNC: 4 MEQ/L (ref 3.5–5.2)
RBC # BLD: 2.87 MILL/MM3 (ref 4.7–6.1)
SODIUM BLD-SCNC: 138 MEQ/L (ref 135–145)
WBC # BLD: 4.6 THOU/MM3 (ref 4.8–10.8)

## 2022-08-17 PROCEDURE — 6370000000 HC RX 637 (ALT 250 FOR IP)

## 2022-08-17 PROCEDURE — 02PY33Z REMOVAL OF INFUSION DEVICE FROM GREAT VESSEL, PERCUTANEOUS APPROACH: ICD-10-PCS | Performed by: INTERNAL MEDICINE

## 2022-08-17 PROCEDURE — 2500000003 HC RX 250 WO HCPCS: Performed by: RADIOLOGY

## 2022-08-17 PROCEDURE — 77001 FLUOROGUIDE FOR VEIN DEVICE: CPT

## 2022-08-17 PROCEDURE — 6370000000 HC RX 637 (ALT 250 FOR IP): Performed by: INTERNAL MEDICINE

## 2022-08-17 PROCEDURE — 36558 INSERT TUNNELED CV CATH: CPT

## 2022-08-17 PROCEDURE — 97116 GAIT TRAINING THERAPY: CPT

## 2022-08-17 PROCEDURE — 2709999900 IR FLUORO GUIDED CVA DEVICE PLMT/REPLACE/REMOVAL

## 2022-08-17 PROCEDURE — 97110 THERAPEUTIC EXERCISES: CPT

## 2022-08-17 PROCEDURE — 99239 HOSP IP/OBS DSCHRG MGMT >30: CPT | Performed by: INTERNAL MEDICINE

## 2022-08-17 PROCEDURE — 6360000002 HC RX W HCPCS: Performed by: INTERNAL MEDICINE

## 2022-08-17 PROCEDURE — 93010 ELECTROCARDIOGRAM REPORT: CPT | Performed by: INTERNAL MEDICINE

## 2022-08-17 PROCEDURE — 80048 BASIC METABOLIC PNL TOTAL CA: CPT

## 2022-08-17 PROCEDURE — 94640 AIRWAY INHALATION TREATMENT: CPT

## 2022-08-17 PROCEDURE — 6360000002 HC RX W HCPCS: Performed by: RADIOLOGY

## 2022-08-17 PROCEDURE — 97535 SELF CARE MNGMENT TRAINING: CPT

## 2022-08-17 PROCEDURE — 02HV33Z INSERTION OF INFUSION DEVICE INTO SUPERIOR VENA CAVA, PERCUTANEOUS APPROACH: ICD-10-PCS | Performed by: INTERNAL MEDICINE

## 2022-08-17 PROCEDURE — 99232 SBSQ HOSP IP/OBS MODERATE 35: CPT | Performed by: INTERNAL MEDICINE

## 2022-08-17 PROCEDURE — 82948 REAGENT STRIP/BLOOD GLUCOSE: CPT

## 2022-08-17 PROCEDURE — 85027 COMPLETE CBC AUTOMATED: CPT

## 2022-08-17 PROCEDURE — 2580000003 HC RX 258

## 2022-08-17 PROCEDURE — 97530 THERAPEUTIC ACTIVITIES: CPT

## 2022-08-17 PROCEDURE — 0JH63XZ INSERTION OF TUNNELED VASCULAR ACCESS DEVICE INTO CHEST SUBCUTANEOUS TISSUE AND FASCIA, PERCUTANEOUS APPROACH: ICD-10-PCS | Performed by: INTERNAL MEDICINE

## 2022-08-17 PROCEDURE — 36415 COLL VENOUS BLD VENIPUNCTURE: CPT

## 2022-08-17 RX ORDER — MIDAZOLAM HYDROCHLORIDE 1 MG/ML
1 INJECTION INTRAMUSCULAR; INTRAVENOUS ONCE
Status: COMPLETED | OUTPATIENT
Start: 2022-08-17 | End: 2022-08-17

## 2022-08-17 RX ORDER — HYDRALAZINE HYDROCHLORIDE 50 MG/1
50 TABLET, FILM COATED ORAL EVERY 8 HOURS SCHEDULED
Qty: 90 TABLET | Refills: 1 | Status: SHIPPED | OUTPATIENT
Start: 2022-08-17 | End: 2022-10-24

## 2022-08-17 RX ORDER — TAMSULOSIN HYDROCHLORIDE 0.4 MG/1
0.4 CAPSULE ORAL DAILY
Qty: 30 CAPSULE | Refills: 2
Start: 2022-08-17

## 2022-08-17 RX ORDER — CARVEDILOL 25 MG/1
25 TABLET ORAL 2 TIMES DAILY WITH MEALS
Qty: 60 TABLET | Refills: 2 | Status: SHIPPED
Start: 2022-08-17 | End: 2022-10-24

## 2022-08-17 RX ADMIN — HEPARIN SODIUM 5000 UNITS: 5000 INJECTION INTRAVENOUS; SUBCUTANEOUS at 09:40

## 2022-08-17 RX ADMIN — HEPARIN SODIUM 5000 UNITS: 5000 INJECTION INTRAVENOUS; SUBCUTANEOUS at 15:20

## 2022-08-17 RX ADMIN — HYDROMORPHONE HYDROCHLORIDE 1 MG: 1 INJECTION, SOLUTION INTRAMUSCULAR; INTRAVENOUS; SUBCUTANEOUS at 08:52

## 2022-08-17 RX ADMIN — CLINDAMYCIN PHOSPHATE 600 MG: 600 INJECTION, SOLUTION INTRAVENOUS at 08:12

## 2022-08-17 RX ADMIN — CARVEDILOL 25 MG: 25 TABLET, FILM COATED ORAL at 09:43

## 2022-08-17 RX ADMIN — MOMETASONE FUROATE AND FORMOTEROL FUMARATE DIHYDRATE 2 PUFF: 100; 5 AEROSOL RESPIRATORY (INHALATION) at 09:24

## 2022-08-17 RX ADMIN — HYDRALAZINE HYDROCHLORIDE 50 MG: 50 TABLET, FILM COATED ORAL at 06:32

## 2022-08-17 RX ADMIN — HYDROMORPHONE HYDROCHLORIDE 1 MG: 1 INJECTION, SOLUTION INTRAMUSCULAR; INTRAVENOUS; SUBCUTANEOUS at 08:42

## 2022-08-17 RX ADMIN — MIDAZOLAM 1 MG: 1 INJECTION INTRAMUSCULAR; INTRAVENOUS at 08:42

## 2022-08-17 RX ADMIN — ROPINIROLE HYDROCHLORIDE 0.5 MG: 1 TABLET, FILM COATED ORAL at 15:20

## 2022-08-17 RX ADMIN — OXYCODONE AND ACETAMINOPHEN 1 TABLET: 10; 325 TABLET ORAL at 06:32

## 2022-08-17 RX ADMIN — HYDRALAZINE HYDROCHLORIDE 50 MG: 50 TABLET, FILM COATED ORAL at 15:19

## 2022-08-17 RX ADMIN — EPOETIN ALFA-EPBX 2000 UNITS: 2000 INJECTION, SOLUTION INTRAVENOUS; SUBCUTANEOUS at 09:45

## 2022-08-17 RX ADMIN — SODIUM CHLORIDE, PRESERVATIVE FREE 10 ML: 5 INJECTION INTRAVENOUS at 10:37

## 2022-08-17 RX ADMIN — OXYCODONE AND ACETAMINOPHEN 1 TABLET: 10; 325 TABLET ORAL at 15:15

## 2022-08-17 RX ADMIN — LEVOTHYROXINE SODIUM 175 MCG: 0.03 TABLET ORAL at 06:32

## 2022-08-17 RX ADMIN — EPOETIN ALFA-EPBX 3000 UNITS: 3000 INJECTION, SOLUTION INTRAVENOUS; SUBCUTANEOUS at 09:45

## 2022-08-17 RX ADMIN — AMITRIPTYLINE HYDROCHLORIDE 10 MG: 10 TABLET, FILM COATED ORAL at 09:42

## 2022-08-17 RX ADMIN — MIDAZOLAM 1 MG: 1 INJECTION INTRAMUSCULAR; INTRAVENOUS at 08:52

## 2022-08-17 ASSESSMENT — PAIN DESCRIPTION - LOCATION
LOCATION: BACK
LOCATION: BACK
LOCATION: NECK
LOCATION: BACK

## 2022-08-17 ASSESSMENT — PAIN DESCRIPTION - ONSET
ONSET: ON-GOING

## 2022-08-17 ASSESSMENT — PAIN SCALES - GENERAL
PAINLEVEL_OUTOF10: 6
PAINLEVEL_OUTOF10: 8
PAINLEVEL_OUTOF10: 7

## 2022-08-17 ASSESSMENT — PAIN DESCRIPTION - DESCRIPTORS
DESCRIPTORS: ACHING
DESCRIPTORS: ACHING
DESCRIPTORS: DISCOMFORT
DESCRIPTORS: ACHING

## 2022-08-17 ASSESSMENT — PAIN DESCRIPTION - ORIENTATION
ORIENTATION: LOWER;MID
ORIENTATION: MID;LOWER
ORIENTATION: MID;LOWER
ORIENTATION: RIGHT
ORIENTATION: LOWER
ORIENTATION: LOWER

## 2022-08-17 ASSESSMENT — PAIN DESCRIPTION - PAIN TYPE
TYPE: CHRONIC PAIN

## 2022-08-17 ASSESSMENT — PAIN DESCRIPTION - FREQUENCY
FREQUENCY: CONTINUOUS

## 2022-08-17 ASSESSMENT — PAIN - FUNCTIONAL ASSESSMENT
PAIN_FUNCTIONAL_ASSESSMENT: PREVENTS OR INTERFERES SOME ACTIVE ACTIVITIES AND ADLS
PAIN_FUNCTIONAL_ASSESSMENT: ACTIVITIES ARE NOT PREVENTED
PAIN_FUNCTIONAL_ASSESSMENT: PREVENTS OR INTERFERES SOME ACTIVE ACTIVITIES AND ADLS

## 2022-08-17 NOTE — H&P
Department of Radiology  Post Procedure Progress Note      Pre-Procedure Diagnosis:  Renal failure    Procedure Performed:  Temporary converted to tunneled HD CVC    Anesthesia: local / versed and dilaudid    Findings: successful    Immediate Complications:  None    Estimated Blood Loss: minimal    SEE DICTATED PROCEDURE NOTE FOR COMPLETE DETAILS.     Electronically signed by Kim Rosario MD on 8/17/2022 at 9:13 AM

## 2022-08-17 NOTE — H&P
[address] [address]  Sedation/Analgesia History & Physical    Pt Name: Nilsa Grady  MRN: [id number]  YOB: 1960  Provider Performing Procedure: Catherine Cotter MD, MD  Primary Care Physician: Jessica Velazquez, DO    PRE-PROCEDURE   DNR-CCA/DNR-CC []Yes [x]No  Brief History/Pre-Procedure Diagnosis: Renal failure          MEDICAL HISTORY  []CAD/Valve  []Liver Disease  []Lung Disease [x]Diabetes  [x]Hypertension [x]Renal Disease  [x]Additional information:       has a past medical history of Arthritis, Back problem, Bladder disease, CHF with unknown LVEF ([facility] [address] Chronic kidney disease, Constipation, Diabetes mellitus ([facility] [address] Hypertension, Hypertensive emergency, Hypertensive urgency, Sleep apnea, and Thyroid disease. SURGICAL HISTORY   has a past surgical history that includes Appendectomy; knee surgery (Right, 1980's); Carpal tunnel release (Bilateral); Colonoscopy (2017); hernia repair; pr exploratory of abdomen (N/A, 2/5/2018); Dilatation, esophagus; Abdomen surgery; Cardiac catheterization; and Abdomen surgery (N/A, 3/25/2022).   Additional information:       ALLERGIES   Allergies as of 08/08/2022 - Fully Reviewed 08/08/2022   Allergen Reaction Noted    Shellfish-derived products Swelling 04/23/2013    Pcn [penicillins] Itching 04/23/2013    Succinylcholine  02/05/2018    Sulfa antibiotics Itching 08/31/2021    Morphine Nausea And Vomiting 01/29/2018    Tape [adhesive tape] Rash 01/29/2018     Additional information:       MEDICATIONS   Coumadin Use Last 5 Days [x]No []Yes  Antiplatelet drug therapy use last 5 days  [x]No []Yes  Other anticoagulant use last 5 days  [x]No []Yes    Current Facility-Administered Medications:     carvedilol (COREG) tablet 25 mg, 25 mg, Oral, BID , Cristine Hudson MD    hydrALAZINE (APRESOLINE) tablet 50 mg, 50 mg, Oral, 3 times per day, Cristine Hudson MD, 50 mg at 08/17/22 2510    polyethylene glycol (GLYCOLAX) packet 17 g, 17 g, Oral, Daily, Jaleesa Avila chloride infusion, , IntraVENous, PRN, Suma Watersmatov, DO    ondansetron (ZOFRAN-ODT) disintegrating tablet 4 mg, 4 mg, Oral, Q8H PRN, 4 mg at 08/16/22 0141 **OR** ondansetron (ZOFRAN) injection 4 mg, 4 mg, IntraVENous, Q6H PRN, Suma Everetttov, DO, 4 mg at 08/10/22 2239    acetaminophen (TYLENOL) tablet 650 mg, 650 mg, Oral, Q6H PRN, 650 mg at 08/10/22 0843 **OR** acetaminophen (TYLENOL) suppository 650 mg, 650 mg, Rectal, Q6H PRN, Suma Watersmatov, DO    oxyCODONE-acetaminophen (PERCOCET)  MG per tablet 1 tablet, 1 tablet, Oral, Q8H PRN, Suma Escoto, DO, 1 tablet at 08/17/22 6946    [Held by provider] aspirin EC tablet 81 mg, 81 mg, Oral, Daily, Suma Watersmatov, DO    nitroGLYCERIN (NITROSTAT) SL tablet 0.4 mg, 0.4 mg, SubLINGual, Q5 Min PRN, Suma Everetttov, DO    montelukast (SINGULAIR) tablet 10 mg, 10 mg, Oral, Nightly, Suma Watersmatov, DO, 10 mg at 08/16/22 2221    mometasone-formoterol (DULERA) 100-5 MCG/ACT inhaler 2 puff, 2 puff, Inhalation, BID, Suma Watersmatov, DO, 2 puff at 08/16/22 1749    rOPINIRole (REQUIP) tablet 0.5 mg, 0.5 mg, Oral, Daily, Suma Watersmatov, DO, 0.5 mg at 08/16/22 1412    melatonin tablet 4.5 mg, 4.5 mg, Oral, Nightly PRN, Suma Watersmatov, DO, 4.5 mg at 08/16/22 2218    [Held by provider] tamsulosin (FLOMAX) capsule 0.4 mg, 0.4 mg, Oral, Nightly, Suma Watersmatov, DO    levothyroxine (SYNTHROID) tablet 175 mcg, 175 mcg, Oral, Daily, Suma Watersmatov, DO, 175 mcg at 08/17/22 9378    oxybutynin (DITROPAN-XL) extended release tablet 5 mg, 5 mg, Oral, Nightly, Suma Sandersonv DO, 5 mg at 08/16/22 2221  Prior to Admission medications    Medication Sig Start Date End Date Taking?  Authorizing Provider   naloxone 4 MG/0.1ML LIQD nasal spray 1 spray by Nasal route as needed for Opioid Reversal 7/27/22   Ren Rubinstein, APRN - CNP   rOPINIRole (REQUIP) 0.5 MG tablet take 1 tablet by mouth IN THE AFTERNOON AND 2 TABLETS AT NIGHT. 7/25/22   Yuli Matthew PA-C   potassium chloride (K-TAB) 10 MEQ extended release tablet Take 2 tablets by mouth in the morning. 7/22/22   Dayton Hood MD   bumetanide (BUMEX) 1 MG tablet Take 1 tablet by mouth in the morning and 1 tablet before bedtime. 7/22/22   Dayton Hood MD   gabapentin (NEURONTIN) 300 MG capsule Take 1 capsule by mouth nightly for 30 days. 7/22/22 8/21/22  SAHIL Macias CNP   oxyCODONE-acetaminophen (PERCOCET)  MG per tablet Take 1 tablet by mouth every 8 hours as needed for Pain for up to 30 days.  Intended supply: 30 days 7/21/22 8/20/22  SAHIL Macias CNP   doxepin (SINEQUAN) 25 MG capsule take 2 capsules by mouth at bedtime 6/13/22   Yuli Matthew PA-C   CPAP Machine MISC by Does not apply route Please change bipap to 18/14 cm H20. 5/31/22   Yuli Matthew PA-C   carvedilol (COREG) 25 MG tablet Take 2 tablets by mouth 2 times daily 5/24/22   SAHIL Fuentes CNP   oxybutynin (DITROPAN-XL) 5 MG extended release tablet Take 1 tablet by mouth at bedtime 4/24/22   Trena Houston DO   amitriptyline (ELAVIL) 10 MG tablet Take 10 mg by mouth 2 times daily    Historical Provider, MD   tamsulosin (FLOMAX) 0.4 MG capsule Take 1 capsule by mouth nightly 4/12/22 4/12/23  Janeth Morton MD   hydrALAZINE (APRESOLINE) 25 MG tablet Take 1 tablet by mouth every 8 hours 3/30/22   Neto Case DO   levothyroxine (SYNTHROID) 175 MCG tablet Take 1 tablet by mouth Daily 3/31/22   Trevon Vargas DO   amLODIPine (NORVASC) 10 MG tablet Take 1 tablet by mouth daily  Patient not taking: No sig reported 3/31/22   Trevon Vargas, DO   melatonin 3 MG TABS tablet Take 1.5 tablets by mouth nightly as needed (Sleep) 3/30/22 8/4/22  Trevon Vargas DO   ipratropium-albuterol (DUONEB) 0.5-2.5 (3) MG/3ML SOLN nebulizer solution Inhale 3 mLs into the lungs every 4 hours as needed for Shortness of Breath  Patient taking differently: Inhale 1 vial into the lungs every 4 hours as needed for Shortness of Breath Usually uses at night 3/21/22   [place]ico Nunez,    Fluticasone furoate-vilanterol (BREO ELLIPTA) 200-25 MCG/INH AEPB inhaler Inhale 1 puff into the lungs daily  Patient not taking: No sig reported 2/15/22   SAHIL Renner CNP   montelukast (SINGULAIR) 10 MG tablet Take 1 tablet by mouth nightly 2/15/22   SAHIL Renner CNP   albuterol sulfate  (90 Base) MCG/ACT inhaler Inhale 2 puffs into the lungs every 6 hours as needed for Wheezing or Shortness of Breath 2/15/22   SAHIL Renner CNP   rOPINIRole (REQUIP) 1 MG tablet Take 1 pill in afternoon and 2 at bedtime 12/28/21   Sam Adams PA-C   aspirin EC 81 MG EC tablet Take 1 tablet by mouth daily 9/10/21   SAHIL Hilton CNP   nitroGLYCERIN (NITROSTAT) 0.4 MG SL tablet Place 1 tablet under the tongue every 5 minutes as needed for Chest pain (up to 3 doses) 6/7/21   SAHIL Hilton CNP   glimepiride (AMARYL) 4 MG tablet Take 4 mg by mouth daily  1/16/21   Historical Provider, MD   azelastine (ASTELIN) 137 MCG/SPRAY nasal spray 1 spray by Nasal route as needed.  Use in each nostril as directed    Historical Provider, MD     Additional information:       VITAL SIGNS   Vitals:    08/17/22 0857   BP: 124/64   Pulse: 60   Resp: 21   Temp:    SpO2: 99%       PHYSICAL:   Heart:  [x]Regular rate and rhythm  []Other:    Lungs:  [x]Clear    []Other:    Abdomen: [x]Soft    []Other:    Mental Status: [x]Alert & Oriented  []Other:      PLANNED PROCEDURE   []Biospy []Arteriogram              []Drainage   []Mediport Insertion  []Fistulogram []IV access       []Vertebroplasty / Augmentation  []IVC filter [x]Dialysis catheter []Biliary drainage  []Other: []CAPD Catheter []Nephrostomy Tube / Stent  SEDATION  Planned agent:[x]Midazolam []Meperidine []Sublimaze [x]Dilaudid []Morphine     []Diazepam  []Other:     ASA Classification:  []1 [x]2 []3 []4 []5  Class 1: A normal healthy patient  Class 2: Pt with mild to moderate systemic disease  Class 3: Severe systemic disease or disturbance  Class 4: Severe systemic disorders that are already life threatening. Class 5: Moribund pt with little chances of survival, for more than 24 hours. Mallampati I Airway Classification:   []1 [x]2 []3 []4    [x]Pre-procedure diagnostic studies complete and results available. Comment:    [x]Previous sedation/anesthesia experiences assessed. Comment:    [x]The patient is an appropriate candidate to undergo the planned procedure sedation and anesthesia. (Refer to nursing sedation/analgesia documentation record)  [x]Formulation and discussion of sedation/procedure plan, risks, and expectations with patient and/or responsible adult completed. [x]Patient examined immediately prior to the procedure.  (Refer to nursing sedation/analgesia documentation record)    Angel Lopez MD, MD  Electronically signed 8/17/2022 at 9:10 AM

## 2022-08-17 NOTE — CARE COORDINATION
8/17/22, 12:16 PM EDT    DISCHARGE PLANNING EVALUATION    Spoke with María Ochoa this morning about the idea of home health and discussed with him what home health would entail. He is agreeable to RN, PT and OT services with [facility] just until he gets \"back to normal\". This SW spoke with Mo Vazquez at the agency and made referral.       8/17/22, 4:33 PM EDT    Patient goals/plan/ treatment preferences discussed by  and . Patient goals/plan/ treatment preferences reviewed with patient/ family. Patient/ family verbalize understanding of discharge plan and are in agreement with goal/plan/treatment preferences. Understanding was demonstrated using the teach back method. AVS provided by RN at time of discharge, which includes all necessary medical information pertaining to the patients current course of illness, treatment, post-discharge goals of care, and treatment preferences. Services At/After Discharge: Home Health, Nursing service, OT, and PT- [facility]       IMM Letter  IMM Letter given to Patient/Family/Significant other/Guardian/POA/by[de-identified] Eder Diallo   IMM Letter date given[de-identified] 08/17/22  IMM Letter time given[de-identified] 1036     María Ochoa will be discharged today to home with his wife and [facility] for RN, PT and OT. Spoke with agency and made them aware of discharge.

## 2022-08-17 NOTE — PROGRESS NOTES
[address] RENAL TELEMETRY 6K  Occupational Therapy  Daily Note  Time:    Time In: 1110  Time Out: 1151  Timed Code Treatment Minutes: 41 Minutes  Minutes: 41          Date: 2022  Patient Name: Frank Landaverde,   Gender: male      [facility]  MRN: [id number]  : 1960  (63 y.o.)  Referring Practitioner: Farrukh Lara PA-C  Diagnosis: hypokalemia  Additional Pertinent Hx: Per ER note on 2022:61 y.o. male who presents to the ED for evaluation of abnormal labs. Patient states he had routine lab work done and his nephrologist called him and told him he needed to come to the emergency room because the labs were abnormal.  Patient reports he has been weak and fatigued today. Had to call his wife to help him get from the car into the house after he went and had his labs drawn. s/p Dialysis Catheter insertion on 8/10/2022. Restrictions/Precautions:  Restrictions/Precautions: Fall Risk  Position Activity Restriction  Other position/activity restrictions: 2L O2 on 2022      SUBJECTIVE: Pt sitting up in bedside chair stating he didn't sleep well last night d/t having increased diarrhea    PAIN: 0 /10:      Vitals: Vitals not assessed per clinical judgement, see nursing flowsheet    COGNITION: WNL    ADL:   Lower Extremity Dressing: Supervision. Use of LHAE this date to don socks and shorts   Toileting: Stand By Assistance. Use of urinal  .    BALANCE:  Standing Balance: Stand By Assistance. During aDL tasks     BED MOBILITY:  Not Tested    TRANSFERS:  Sit to Stand:  Stand By Assistance. From bedside chair   Stand to Sit: Stand By Assistance. Into bedside chair     FUNCTIONAL MOBILITY:  Assistive Device: Straight Cane  Assist Level:  Stand By Assistance.    Distance:  into hallways   Pt on 2L during with sats at 88% upon returning back to bed/ Pt educated on breathing tech of pursed lip breathing      ADDITIONAL ACTIVITIES:  Initiated education on energy conservation tech ot taking frequent rest breaks and planning out day. Pt educated on knowing where chair/benches are as well to take seated rest breaks if needed. ASSESSMENT:     Activity Tolerance:  Patient tolerance of  treatment: fair. Discharge Recommendations: Home with assist as needed  Equipment Recommendations: Equipment Needed: Yes  Other: Monitor for bariatric RW need  Plan: Times per Week: 5x  Current Treatment Recommendations: Balance training, Self-Care / ADL, Functional mobility training, Endurance training, Patient/Caregiver education & training, Safety education & training    Patient Education  Patient Education:  energy conservation tech     Goals  Short Term Goals  Time Frame for Short term goals: until discharge  Short Term Goal 1: Pt will complete various t/fs including toilet with S & 0-2 vcs for safety  Short Term Goal 2: Pt will tolerate standing 2-3 min with S for increased ease of sinkside grooming  Short Term Goal 3: Pt will complete LE dressing with min A & LH AE prn  Short Term Goal 4: Pt will complete mobility to/from bathroom with RW, S, & 0-2 vcs for safety  Long Term Goals  Time Frame for Long term goals : No LTG set d/t short ELOS    Following session, patient left in safe position with all fall risk precautions in place.

## 2022-08-17 NOTE — CARE COORDINATION
8/17/22, 10:41 AM EDT    DISCHARGE ON [place] day: 9  Location: [facility]01/001-A Reason for admit: Hyperkalemia [E87.5]  Acute renal failure superimposed on stage 5 chronic kidney disease, not on chronic dialysis, unspecified acute renal failure type ([facility] [address] [N17.9, N18.5]   Procedure: 8/17 tunneled cath placement   Barriers to Discharge: Await approval of OP HD. Submitted 8/16. Anticipate discharge today. PCP: Betsy Cooks, DO  Readmission Risk Score: 23.4%  Patient Goals/Plan/Treatment Preferences: Home with wife. Has home oxygen and bipap from  DME. Will have new OP HD at [facility]. SW following for [facility] arrangement. 8/17/22, 2:31 PM EDT    Patient goals/plan/ treatment preferences discussed by  and . Patient goals/plan/ treatment preferences reviewed with patient/ family. Patient/ family verbalize understanding of discharge plan and are in agreement with goal/plan/treatment preferences. Understanding was demonstrated using the teach back method. AVS provided by RN at time of discharge, which includes all necessary medical information pertaining to the patients current course of illness, treatment, post-discharge goals of care, and treatment preferences.      Services At/After Discharge: Home Health       IMM Letter  IMM Letter given to Patient/Family/Significant other/Guardian/POA/by[de-identified] Nalini Villalba CM  IMM Letter date given[de-identified] 08/17/22  IMM Letter time given[de-identified] 7620

## 2022-08-17 NOTE — H&P
Formulation and discussion of sedation / procedure plans, risks, benefits, side effects and alternatives with patient and/or responsible adult completed.     Electronically signed by Domingo Arthur MD on 8/17/2022 at 9:10 AM

## 2022-08-17 NOTE — FLOWSHEET NOTE
08/17/22 1356   Safe Environment   Safety Measures   (virtual safety rounds complete)   Virtual nurse rounds, patient up in chair. Denies any needs at this time. No safety concerns identified. Call light within reach.  Will continue to monitor

## 2022-08-17 NOTE — PROGRESS NOTES
AVS reviewed with patient and spouse. Reviewed changes in home medications. Reviewed education on dialysis and dialysis catheter care. Education included on when to contact physician. Patient and spouse voiced understanding. Denied any other needs. Bedside nurse notified discharge education completed.

## 2022-08-17 NOTE — PROGRESS NOTES
Nutrition Education  Assessment:   Pt. nutritionally compromised AEB knowledge deficit related to nutrition recommendations while on hemodilaysis. At risk for further nutrition compromise r/t admit with abnormal lab values~ K 7 and creatinine 6.6; need for dialysis;  tunneled catheter placed 8/17; recent admit 7/19/22 d/t SOB and bilateral leg swelling; was at risk for malnutrition during previous admit; and underlying medical condition (PMH: bladder disease, CHF, CKD, DM, constipation, HTN, sleep apnea, bowel resection 2018). Pt. Report/Treatments/Miscellaneous: Planning dialysis treatment tomorrow. Pt is expected to discharge this evening. Pt eating great this admit. Wife present in room during nutrition assessment. GI Status: last BM 8/17  Pertinent Labs: Na 138, K 4.0, BUN 28, creatinine 3.7, GFR 17, glucose 78, hgb 7.5    Nutrition Education:  Educated on nutrition recommendations during hemodialysis. Discussed need for higher protein while on dialysis. Discussed important nutrients to watch and limit such as potassium, phosphorus. Provided patient with nutrition handouts and encouraged pt to reach out with questions and concerns. Discussed utilizing nutrition supplement - nepro as this is more suitable for patients on dialysis. Pt familiar with most of nutrition recommendations but appreciative of new recommendations for dialysis. Learners: Patient and Significant Other  Readiness: Eager  Method: Explanation, Handout, and Teachback  Response: Verbalizes Understanding; would benefit from reinforcement. Contact name and number provided.     Noelle Mcmillan RD  Contact Number: [phone]

## 2022-08-17 NOTE — PROGRESS NOTES
CLINICAL PHARMACY: DISCHARGE MED RECONCILIATION/REVIEW    [facility] ([facility]) Select Patient?: No  Total # of Interventions Recommended: 0  Total # Interventions Accepted: 0  Intervention Severity:   - Level 1 Intervention Present?: No   - Level 2 #: 0   - Level 3 #: 0   Time Spent (min): 15    Brandon Sheehan PharmD, BCPS  8/17/2022  3:24 PM

## 2022-08-17 NOTE — PROGRESS NOTES
[facility]   Nephrology progress note  8/17/2022, 10:22 AM      Pt Name:    Gina Lin  MRN:     [id number]     YOB: 1960  Admit Date:    8/8/2022  6:24 PM    Chief Complaint: Nephrology following for CAROLE/CKD. Subjective:  Patient seen and examined  Seen and examined earlier today  Patient had tunneled dialysis catheter inserted  No acute distress  Awake and alert      Objective:  24HR INTAKE/OUTPUT:    Intake/Output Summary (Last 24 hours) at 8/17/2022 1022  Last data filed at 8/17/2022 0050  Gross per 24 hour   Intake 1320 ml   Output 1700 ml   Net -380 ml           I/O last 3 completed shifts: In: 7738 [P.O.:1280]  Out: 2950 [Urine:1550]  No intake/output data recorded.      Admission weight: (!) 329 lb (149.2 kg)  Wt Readings from Last 3 Encounters:   08/17/22 (!) 322 lb 12.8 oz (146.4 kg)   08/04/22 (!) 329 lb (149.2 kg)   07/25/22 (!) 329 lb (149.2 kg)        Vitals :   Vitals:    08/17/22 0847 08/17/22 0852 08/17/22 0857 08/17/22 0930   BP: (!) 110/55 130/64 124/64 (!) 157/89   Pulse: 61 60 60 71   Resp: 13 20 21 18   Temp:    97.7 °F (36.5 °C)   TempSrc:    Oral   SpO2: 99% 100% 99% 93%   Weight:       Height:           Physical examination  General Appearance: alert and cooperative with exam, appears comfortable, no distress  Mouth/Throat: Oral mucosa moist  Neck: No JVD  Lungs: no use of accessory muscles  GI: soft, non-tender, no guarding, + obese  Extremities: No pitting LE edema    Medications:  Infusion:    dextrose      sodium chloride       Meds:    carvedilol  25 mg Oral BID WC    hydrALAZINE  50 mg Oral 3 times per day    polyethylene glycol  17 g Oral Daily    heparin (porcine)  5,000 Units SubCUTAneous 3 times per day    amitriptyline  10 mg Oral BID    doxepin  50 mg Oral Nightly    gabapentin  300 mg Oral Nightly    rOPINIRole  1 mg Oral Nightly    epoetin justin-epbx  2,000 Units SubCUTAneous Once per day on Mon Wed Fri    And    epoetin justin-epbx 3,000 Units SubCUTAneous Once per day on Mon Wed Fri    sodium chloride flush  5-40 mL IntraVENous 2 times per day    [Held by provider] aspirin EC  81 mg Oral Daily    montelukast  10 mg Oral Nightly    mometasone-formoterol  2 puff Inhalation BID    rOPINIRole  0.5 mg Oral Daily    [Held by provider] tamsulosin  0.4 mg Oral Nightly    levothyroxine  175 mcg Oral Daily    oxybutynin  5 mg Oral Nightly     Meds prn: bisacodyl, albuterol sulfate HFA, doxepin, glucose, dextrose bolus **OR** dextrose bolus, glucagon (rDNA), dextrose, sodium chloride flush, sodium chloride, ondansetron **OR** ondansetron, acetaminophen **OR** acetaminophen, oxyCODONE-acetaminophen, nitroGLYCERIN, melatonin     Lab Data :  CBC:   Recent Labs     08/16/22  0352 08/16/22  1605 08/17/22  0532   WBC 4.6*  --  4.6*   HGB 7.3* 7.9* 7.5*   HCT 24.5* 25.7* 24.6*     --  206       CMP:  Recent Labs     08/15/22  0521 08/16/22  0352 08/17/22  0532    139 138   K 4.3 4.5 4.0    103 102   CO2 24 25 26   BUN 29* 35* 28*   CREATININE 4.6* 5.0* 3.7*   GLUCOSE 78 80 103   CALCIUM 8.5 8.5 8.9       Hepatic:   No results for input(s): LABALBU, AST, ALT, ALB, BILITOT, ALKPHOS in the last 72 hours. Assessment and Plan:  1. 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 Continue with hemodialysis 3 times weekly  Plan hemodialysis treatment tomorrow  No acute need for dialysis today  Status post tunneled dialysis catheter placement  Discussed with patient  Discussed with nursing staff    2. Hyperkalemia, refractory. Improved with dialysis  3. Diabetic nephropathy with proteinuria  4. Hypertension  5. Anemia in CKD. Iron saturation 12%. On iron and ISAAC  6. Obesity  7.   Sleep apnea    D/W patient   Discharge when outpatient dialysis has been set up    Michelle Joy MD  [facility]    This report has been created using voice recognition software.  It may contain minor errors which are inherent in voice recognition technology

## 2022-08-17 NOTE — PROGRESS NOTES
0818 Pt arrived to special procedure room 2 for temporary to tunneled hemodialysis catheter exchange with conscious sedation. Patient states no family is present. 8441 Procedure explained using teach back method. Pt states understanding. 5412 Dr Gladys Jerez into assess patient and explain procedure. This procedure has been fully reviewed with the patient and written informed consent has been obtained. 5618 Patient assisted to table in supine position. Monitor attached to patient. Comfort ensured. 0931 Pre-procedure images obtained. 8884 Procedure begins. 8839 Procedure complete. Post-procedure images obtained. [id number] Monitor removed. 6097 Patient assisted to bed in semi fowlers position. Comfort ensured. 0117 Patient transported to [facility] [phone] in stable condition.

## 2022-08-17 NOTE — FLOWSHEET NOTE
08/17/22 0958   Safe Environment   Safety Measures   (virtual safety rounds complete)   Virtual nurse rounds, patient in bed. Denies any needs at this time. No safety concerns identified. Call light within reach.  Will continue to monitor

## 2022-08-17 NOTE — PROGRESS NOTES
[facility]  INPATIENT PHYSICAL THERAPY  DAILY NOTE  STRZ RENAL TELEMETRY 6K - 6K-01/001-A    Time In: 0879  Time Out: 1454  Timed Code Treatment Minutes: 23 Minutes  Minutes: 23          Date: 2022  Patient Name: Lupe Grijalva,  Gender:  male        MRN: [id number]  : 1960  (63 y.o.)     Referring Practitioner: Karen Pineda PA-C  Diagnosis: Hyperkalemia  Additional Pertinent Hx: 63 y.o. male who presented to [facility] with abnormal labs. Patient went to regular scheduled weekly labs drawn and call back from nephrology service about abnormal labs and sent to ED [facility]. Patient complains fatigue which is worse than used to be. Denies chest pain heart palpitations dizziness lightheadedness fever chills productive cough abdominal pain dysuria. In ED, found potassium 7.0, BUN 72, creatinine 6.6, anion gap 16, GFR 9, magnesium 2.5, hemoglobin 8.6, hematocrit 28, MCV 85, troponin 0.0 35  EKG is negative for acute ischemic changes. Prior Level of Function:  Lives With: Spouse  Type of Home: House  Home Layout: Work area in basement, Two level, Performs ADL's on one level (bedroom is upstairs but pt has been sleeping in recliner on main floor)  Home Access: Stairs to enter with rails (4)  Entrance Stairs - Rails: Both  Home Equipment: Cane, quad, Oxygen (2-4L O2 at home)   Bathroom Shower/Tub: Walk-in shower  Bathroom Toilet: Standard    ADL Assistance: Independent  Ambulation Assistance: Independent  Transfer Assistance: Independent  Additional Comments: Pt reports using no AD at home, quad cane for community distance. Has been sleeping in recliner on 1st level, has bathroom on 1st level-\"man cave\" in basement. Pt reports falling back into recliner x 2 recently d/t being dizzy headed. 2-4L O2 all the time at home.     Restrictions/Precautions:  Restrictions/Precautions: Fall Risk  Position Activity Restriction  Other position/activity restrictions: 2L O2 on 8/11/2022     SUBJECTIVE: RN approved session. Pt pleasant and agreeable to therapy    PAIN: 7/10: back (chronic)     Vitals: Oxygen: 2L O2; pt requesting 3L with ambulation; PO2 93% prior to ambulation 88% and then quickly back to 90% after ambulation     OBJECTIVE:  Bed Mobility:  Not Tested  *pt up in the chair upon arrival, requesting to stay in the chair at end of session     Transfers:  Sit to Stand: Stand By Assistance  Stand to Sit:Stand By Assistance    Ambulation:  Stand By Assistance  Distance: 230'   Surface: Level Tile  Device:Cane  Gait Deviations: Wide Base of Support    Balance:  Static Sitting Balance:  Supervision  Static Standing Balance: Stand By Assistance    Exercise:  Patient was guided in 1 set(s) 10 reps of exercise to both lower extremities. Seated marches, Seated hamstring curls, Seated heel/toe raises, Long arc quads, and Seated abduction/adduction. Exercises were completed for increased independence with functional mobility. Functional Outcome Measures: Completed  AM-PAC Inpatient Mobility Raw Score : 18  AM-PAC Inpatient T-Scale Score : 43.63    ASSESSMENT:  Assessment: Patient progressing toward established goals. Activity Tolerance:  Patient tolerance of  treatment: good.  Mild SOB noted, no significant LOB     Equipment Recommendations:Equipment Needed: No  Discharge Recommendations: Home with Assist as Needed  Plan: Current Treatment Recommendations: Strengthening, Balance training, Endurance training, Functional mobility training, Gait training, Stair training  Plan:  (5x GM)    Patient Education  Patient Education: Plan of Care, Transfers, Gait, Verbal Exercise Instruction    Goals:  Patient goals : be able to go back upstairs  Short Term Goals  Time Frame for Short term goals: by discharge  Short term goal 1: bed mobility with HOB flat, no rails, mod I for increased functional ind  Short term goal 2: sit<>stand from various surfaces with LRD mod I for safe transfers  Short term goal 3: abmulate Liz  1560' with LRD mod I for safe household distances  Short term goal 4: navigate 4 steps with LRD mod I for safe enter/exit of home  Long Term Goals  Time Frame for Long term goals : NA d/t short ELOS    Following session, patient left in safe position with all fall risk precautions in place.     Jens Shah ([place]x) PT, DPT

## 2022-08-18 NOTE — DISCHARGE SUMMARY
Internal Medicine Resident Discharge Summary      Patient Identification:   Cachorro Harris   : 1960  MRN: [id number]   Account: [de-identified]      Patient's PCP: Celia Neff DO    Admit Date: 2022     Discharge Date: 2022      Admitting Physician: Ilia Reina MD     Discharge Physician: Emelia Guillory, [facility] Course:   Cachorro Harris is a 63 y.o. male who presented to [address] [address] on 2022 with abnormal labs. Patient went to regular scheduled weekly labs drawn and call back from nephrology service about abnormal labs and sent to ED [address] [address]. Patient complains fatigue which is worse than used to be. Denies chest pain heart palpitations dizziness lightheadedness fever chills productive cough abdominal pain dysuria. In ED, found potassium 7.0, BUN 72, creatinine 6.6, anion gap 16, GFR 9, magnesium 2.5, hemoglobin 8.6, hematocrit 28, MCV 85, troponin 0.0 35  EKG is negative for acute ischemic changes. And admitted for further evaluation and management     Neurology consulted and following. Started Carlene Leak for treatment of hyperkalemia. Developed muscle twitches/spasms. On  patient had 1 PRBC unit transfused due to Hgb 6.8. Hgb 7.6 posttransfusion. Patient underwent HD on 8/10, , , and . Tunneled cath was placed . Patient to undergo hemodialysis every Tuesday, Thursday, and Saturday. CAROLE on CKD stage IV  Likely underlying diabetic nephropathy and possible postobstructive secondary FSGS from morbid obesity. Patient underwent HD on 8/10, , , and . Tunneled cath was placed . Patient to undergo hemodialysis every Tuesday, Thursday, and Saturday. Hyperkalemia, resolved  K 7.0 on admission. Likely from CAROLE and KCl supplements. Resolved with dialysis. Chronic normocytic anemia  1 unit PRBC transfused on . Venofer 200 mg daily given -8/15.      NIDDMII, well controlled  A1c 4.7 on 5/2022. COPD with chronic hypoxic respiratory failure  Baseline 2 L nasal cannula at home. Hypertension, controlled  Patient to continue home Coreg and hydralazine     Hypothyroidism  TSH 3.090 on 4/2021    Obstructive sleep apnea  Patient to continue CPAP nightly. Deconditioning    Chronic pain      Discharge Diagnoses: CAROLE on CKD stage IV    Hyperkalemia, resolved    Chronic normocytic anemia    NIDDMII, well controlled    COPD with chronic hypoxic respiratory failure    Hypertension, controlled     Hypothyroidism    Obstructive sleep apnea    Deconditioning    Chronic pain    The patient was seen and examined on day of discharge and this discharge summary is in conjunction with any daily progress note from day of discharge. The patient is discharged in stable condition. Exam:     Vitals:  Vitals:    08/17/22 0930 08/17/22 1108 08/17/22 1515 08/17/22 1549   BP: (!) 157/89 127/77  (!) 158/89   Pulse: 71 57  70   Resp: 18 18 18 18   Temp: 97.7 °F (36.5 °C) 98.1 °F (36.7 °C)  98.2 °F (36.8 °C)   TempSrc: Oral Oral  Oral   SpO2: 93% 100%  96%   Weight:       Height:         Weight: Weight: (!) 322 lb 12.8 oz (146.4 kg)     24 hour intake/output:  Intake/Output Summary (Last 24 hours) at 8/17/2022 2039  Last data filed at 8/17/2022 1532  Gross per 24 hour   Intake 860 ml   Output 1075 ml   Net -215 ml     General: No distress, appears stated age. Chronically ill-appearing  Eyes:  PERRL. Conjunctivae/corneas clear. HENT: Head normal appearing. Nares normal. Oral mucosa moist.  Hearing intact. Neck: Supple, with full range of motion. Trachea midline. No gross JVD appreciated. Respiratory:  Normal effort. Clear to auscultation, without rales or wheezes or rhonchi. Cardiovascular: Normal rate, regular rhythm with normal S1/S2 without murmurs. No lower extremity edema. Abdomen: Soft, non-tender, non-distended with normal bowel sounds. Musculoskeletal: No joint swelling or tenderness. Normal tone. No abnormal movements. Skin: Warm and dry. No rashes or lesions. Bilateral lower extremity nonpitting edema  Neurologic:  No focal sensory/motor deficits in the upper or lower extremities. Psychiatric: Alert and oriented, normal insight and thought content. Capillary Refill: Brisk,< 3 seconds. Peripheral Pulses: +2 palpable, equal bilaterally. Labs: For convenience and continuity at follow-up the following most recent labs are provided:    CBC:    Lab Results   Component Value Date/Time    WBC 4.6 08/17/2022 05:32 AM    HGB 7.5 08/17/2022 05:32 AM    HCT 24.6 08/17/2022 05:32 AM     08/17/2022 05:32 AM       Renal:    Lab Results   Component Value Date/Time     08/17/2022 05:32 AM    K 4.0 08/17/2022 05:32 AM    K 5.3 08/11/2022 04:56 AM     08/17/2022 05:32 AM    CO2 26 08/17/2022 05:32 AM    BUN 28 08/17/2022 05:32 AM    CREATININE 3.7 08/17/2022 05:32 AM    CALCIUM 8.9 08/17/2022 05:32 AM    PHOS 7.7 08/09/2022 06:26 AM         Significant Diagnostic Studies    Radiology:   IR FLUORO GUIDED CVA DEVICE PLMT/REPLACE/REMOVAL   Final Result   Status post removal of the indwelling non-tunneled dialysis catheter and replacement with a new tunneled dialysis catheter, as outlined above. **This report has been created using voice recognition software. It may contain minor errors which are inherent in voice recognition technology. **      Final report electronically signed by Dr Gisela Alonso on 8/17/2022 12:01 PM      IR FLUORO GUIDED CVA DEVICE PLMT/REPLACE/REMOVAL   Final Result   Status post successful nontunneled dialysis catheter insertion. **This report has been created using voice recognition software. It may contain minor errors which are inherent in voice recognition technology. **      Final report electronically signed by Dr. Juan Tesfaye on 8/10/2022 11:24 AM      US RENAL COMPLETE   Final Result   1. No hydronephrosis.    2. Bilateral renal cysts. 3. Increased postvoid bladder residual and 65 mL. **This report has been created using voice recognition software. It may contain minor errors which are inherent in voice recognition technology. **      Final report electronically signed by Dr Maura Du on 8/9/2022 4:06 PM             Consults:     IP CONSULT TO [facility] [address]  IP CONSULT TO SOCIAL WORK  IP CONSULT TO HOME CARE NEEDS    Disposition: Home  Condition at Discharge: Stable    Code Status:  Full Code    Patient Instructions:    Discharge lab work: H/H with results to PCP  Activity: activity as tolerated  Diet: No diet orders on file      Follow-up visits:   [address]  [address]  [facility] [address]  [phone]        [facility]  [address]  [facility] [address]  Go on 8/18/2022  For dialysis:  every Tues, Thursday, and Saturday at 11:10 a.m. [facility], [address] [facility] [address] [facility] [address]  [phone]    Call in 1 week(s)  Please call and set up an appointment for 1 week. Discharge Medications:        Medication List        CHANGE how you take these medications      carvedilol 25 MG tablet  Commonly known as: Coreg  Take 1 tablet by mouth 2 times daily (with meals)  What changed:   how much to take  when to take this     hydrALAZINE 50 MG tablet  Commonly known as: APRESOLINE  Take 1 tablet by mouth every 8 hours  What changed:   medication strength  how much to take     ipratropium-albuterol 0.5-2.5 (3) MG/3ML Soln nebulizer solution  Commonly known as: DUONEB  Inhale 3 mLs into the lungs every 4 hours as needed for Shortness of Breath  What changed: additional instructions     rOPINIRole 0.5 MG tablet  Commonly known as: REQUIP  take 1 tablet by mouth IN THE AFTERNOON AND 2 TABLETS AT NIGHT. What changed: Another medication with the same name was removed.  Continue taking this medication, and follow the directions you see here. tamsulosin 0.4 MG capsule  Commonly known as: FLOMAX  Take 1 capsule by mouth daily  What changed: when to take this            CONTINUE taking these medications      albuterol sulfate  (90 Base) MCG/ACT inhaler  Commonly known as: PROVENTIL;VENTOLIN;PROAIR  Inhale 2 puffs into the lungs every 6 hours as needed for Wheezing or Shortness of Breath     amitriptyline 10 MG tablet  Commonly known as: ELAVIL     aspirin EC 81 MG EC tablet     azelastine 0.1 % nasal spray  Commonly known as: ASTELIN     CPAP Machine Misc  by Does not apply route Please change bipap to 18/14 cm H20.     doxepin 25 MG capsule  Commonly known as: SINEQUAN  take 2 capsules by mouth at bedtime     gabapentin 300 MG capsule  Commonly known as: NEURONTIN  Take 1 capsule by mouth nightly for 30 days. levothyroxine 175 MCG tablet  Commonly known as: SYNTHROID  Take 1 tablet by mouth Daily     melatonin 3 MG Tabs tablet  Take 1.5 tablets by mouth nightly as needed (Sleep)     montelukast 10 MG tablet  Commonly known as: SINGULAIR  Take 1 tablet by mouth nightly     naloxone 4 MG/0.1ML Liqd nasal spray  1 spray by Nasal route as needed for Opioid Reversal     nitroGLYCERIN 0.4 MG SL tablet  Commonly known as: Nitrostat  Place 1 tablet under the tongue every 5 minutes as needed for Chest pain (up to 3 doses)     oxybutynin 5 MG extended release tablet  Commonly known as: DITROPAN-XL  Take 1 tablet by mouth at bedtime     oxyCODONE-acetaminophen  MG per tablet  Commonly known as: Percocet  Take 1 tablet by mouth every 8 hours as needed for Pain for up to 30 days.  Intended supply: 30 days            STOP taking these medications      amLODIPine 10 MG tablet  Commonly known as: NORVASC     Breo Ellipta 200-25 MCG/INH Aepb inhaler  Generic drug: Fluticasone furoate-vilanterol     bumetanide 1 MG tablet  Commonly known as: BUMEX     glimepiride 4 MG tablet  Commonly known as: AMARYL     potassium chloride 10

## 2022-08-19 NOTE — TELEPHONE ENCOUNTER
OARRS reviewed. UDS: + for  Gabapentin, Oxycodone -consistent. +  Amitriptyline, Doxepin -inconsistent   Last seen: 7/27/2022.  Follow-up: 10/5/2022

## 2022-08-22 RX ORDER — GABAPENTIN 300 MG/1
300 CAPSULE ORAL NIGHTLY
Qty: 30 CAPSULE | Refills: 0 | Status: SHIPPED | OUTPATIENT
Start: 2022-08-22 | End: 2022-10-05 | Stop reason: SDUPTHER

## 2022-08-30 DIAGNOSIS — G89.4 CHRONIC PAIN SYNDROME: ICD-10-CM

## 2022-08-30 RX ORDER — OXYCODONE AND ACETAMINOPHEN 10; 325 MG/1; MG/1
1 TABLET ORAL EVERY 8 HOURS PRN
Qty: 90 TABLET | Refills: 0 | Status: SHIPPED | OUTPATIENT
Start: 2022-08-30 | End: 2022-09-29 | Stop reason: SDUPTHER

## 2022-08-30 NOTE — TELEPHONE ENCOUNTER
OARRS reviewed. UDS: + for  . Doxepin, oxycodone, amitriptyline, gabapentin  Last seen: 7/27/2022.  Follow-up:   Future Appointments   Date Time Provider Ismael Haas   10/3/2022  2:45 PM [facility] [address]   10/5/2022  8:30 AM Travis Clement APRN - CNP [facility] [place]   10/24/2022 11:30 AM Kan Lin APRN - CNP [facility] CHF [facility] [place]   11/14/2022  3:00 PM Carlos Lo APRN - CNP [facility] [place] [facility] [place]   1/5/2023  2:45 PM Roger Jenkins MD [facility] [place]   4/13/2023 12:45 PM Ej Caldwell  [facility] [place]

## 2022-09-14 ENCOUNTER — HOSPITAL ENCOUNTER (OUTPATIENT)
Dept: ULTRASOUND IMAGING | Age: 62
Discharge: HOME | End: 2022-09-14
Payer: MEDICARE

## 2022-09-14 DIAGNOSIS — R31.9 HEMATURIA SYNDROME: ICD-10-CM

## 2022-09-14 PROCEDURE — 76770 US EXAM ABDO BACK WALL COMP: CPT

## 2022-09-29 DIAGNOSIS — G89.4 CHRONIC PAIN SYNDROME: ICD-10-CM

## 2022-10-03 RX ORDER — OXYCODONE AND ACETAMINOPHEN 10; 325 MG/1; MG/1
1 TABLET ORAL EVERY 8 HOURS PRN
Qty: 90 TABLET | Refills: 0 | Status: SHIPPED | OUTPATIENT
Start: 2022-10-03 | End: 2022-11-01 | Stop reason: SDUPTHER

## 2022-10-03 NOTE — TELEPHONE ENCOUNTER
OARRS reviewed. UDS: + for  Gabapentin, Oxycodone -consistent. + for Amitriptyline, Doxepin -inconsistent  Last seen: 7/27/2022.  Follow-up: 10/5/2022

## 2022-10-05 ENCOUNTER — OFFICE VISIT (OUTPATIENT)
Dept: PHYSICAL MEDICINE AND REHAB | Age: 62
End: 2022-10-05
Payer: MEDICARE

## 2022-10-05 VITALS
BODY MASS INDEX: 41.75 KG/M2 | SYSTOLIC BLOOD PRESSURE: 124 MMHG | HEIGHT: 73 IN | DIASTOLIC BLOOD PRESSURE: 78 MMHG | WEIGHT: 315 LBS

## 2022-10-05 DIAGNOSIS — M48.062 SPINAL STENOSIS OF LUMBAR REGION WITH NEUROGENIC CLAUDICATION: ICD-10-CM

## 2022-10-05 DIAGNOSIS — N18.4 CHRONIC KIDNEY DISEASE, STAGE IV (SEVERE) (HCC): ICD-10-CM

## 2022-10-05 DIAGNOSIS — M25.561 CHRONIC PAIN OF RIGHT KNEE: ICD-10-CM

## 2022-10-05 DIAGNOSIS — F11.90 CHRONIC, CONTINUOUS USE OF OPIOIDS: ICD-10-CM

## 2022-10-05 DIAGNOSIS — G89.4 CHRONIC PAIN SYNDROME: ICD-10-CM

## 2022-10-05 DIAGNOSIS — M47.816 LUMBAR FACET ARTHROPATHY: ICD-10-CM

## 2022-10-05 DIAGNOSIS — G89.29 CHRONIC PAIN OF RIGHT KNEE: ICD-10-CM

## 2022-10-05 DIAGNOSIS — M54.17 LUMBOSACRAL RADICULITIS: ICD-10-CM

## 2022-10-05 DIAGNOSIS — M17.11 PRIMARY OSTEOARTHRITIS OF RIGHT KNEE: ICD-10-CM

## 2022-10-05 DIAGNOSIS — M47.816 SPONDYLOSIS OF LUMBAR REGION WITHOUT MYELOPATHY OR RADICULOPATHY: Primary | ICD-10-CM

## 2022-10-05 DIAGNOSIS — G62.9 NEUROPATHY: ICD-10-CM

## 2022-10-05 DIAGNOSIS — I50.32 CHF (CONGESTIVE HEART FAILURE), NYHA CLASS II, CHRONIC, DIASTOLIC (HCC): ICD-10-CM

## 2022-10-05 PROCEDURE — G8417 CALC BMI ABV UP PARAM F/U: HCPCS | Performed by: NURSE PRACTITIONER

## 2022-10-05 PROCEDURE — 99214 OFFICE O/P EST MOD 30 MIN: CPT | Performed by: NURSE PRACTITIONER

## 2022-10-05 PROCEDURE — G8484 FLU IMMUNIZE NO ADMIN: HCPCS | Performed by: NURSE PRACTITIONER

## 2022-10-05 PROCEDURE — G8427 DOCREV CUR MEDS BY ELIG CLIN: HCPCS | Performed by: NURSE PRACTITIONER

## 2022-10-05 PROCEDURE — 3017F COLORECTAL CA SCREEN DOC REV: CPT | Performed by: NURSE PRACTITIONER

## 2022-10-05 PROCEDURE — 4004F PT TOBACCO SCREEN RCVD TLK: CPT | Performed by: NURSE PRACTITIONER

## 2022-10-05 RX ORDER — GABAPENTIN 300 MG/1
300 CAPSULE ORAL NIGHTLY
Qty: 30 CAPSULE | Refills: 0 | Status: SHIPPED | OUTPATIENT
Start: 2022-10-05 | End: 2022-11-01

## 2022-10-05 ASSESSMENT — ENCOUNTER SYMPTOMS
STRIDOR: 0
CHEST TIGHTNESS: 0
CHOKING: 0
COUGH: 0
WHEEZING: 0
BACK PAIN: 1
ABDOMINAL PAIN: 0
SHORTNESS OF BREATH: 1

## 2022-10-05 NOTE — PROGRESS NOTES
[address] [facility] [address]  Dept: [phone]  Dept Fax: [phone]: [phone]    Visit Date: 10/5/2022    Functionality Assessment/Goals Worksheet     On a scale of 0 (Does not Interfere) to 10 (Completely Interferes)     1. Which number describes how during the past week pain has interfered with       the following:  A. General Activity:  8  B. Mood: 7  C. Walking Ability:  9  D. Normal Work (Includes both work outside the home and housework):  5  E. Relations with Other People:   5  F. Sleep:   6  G. Enjoyment of Life:   9    2. Patient Prefers to Take their Pain Medications:     []  On a regular basis   [x]  Only when necessary    [x]  Does not take pain medications    3. What are the Patient's Goals/Expectations for Visiting Pain Management? []  Learn about my pain    [x]  Receive Medication   []  Physical Therapy     []  Treat Depression   []  Receive Injections    []  Treat Sleep   [x]  Deal with Anxiety and Stress   []  Treat Opoid Dependence/Addiction   []  Other:      HPI:   Jewell Rojas is a 63 y.o. male is here today for    Chief Complaint: Low back pain, leg pain     HPI   2 month FU. Continues to have pain in low back- constant aching pain \"toothache\" with some throbbing\". Pain intermittently radiates down buttocks and legs numbness tingling. Pain is more bothersome in low back. Sleeps in  now d/t low back pain   States pain medications with Neurontin and Percocet remain effective in decreasing pain to a tolerable level. Continues neurontin 300 at bedtime d/t kidney function  Had right chest dialysis ports placed a month ago and has started hemodialysis (Tues, Thurs, Saturday) States he is starting to feel better. Just finished PT last week and helped mobility a lot. Not using any assist devices at this time.      Decraesed down to 2 liters of oxygen at this time   Pain increases with bending, lifting, twisting , walking, standing, getting up and down, and housework or working at job. Medications reviewed. Patient denies side effects with medications. Patient states he is taking medications as prescribed. Hedenies receiving pain medications from other sources. Had hospital admission in August for Kidney failure. Pain scale with out pain medications or at its worst is 7-8/10. Pain scale with pain medications or at its best is 5/10. Last dose of Percocet and Neurontin was last night  Drug screen reviewed from 7/27/2022 and was appropriate  Pill count completed  today and WNL: Yes      The patientis allergic to shellfish-derived products, pcn [penicillins], succinylcholine, sulfa antibiotics, morphine, and tape [adhesive tape]. Subjective:      Review of Systems   Constitutional:  Positive for fatigue. Negative for appetite change, chills and fever. Has lost about 65 lbs in past 6 months    Respiratory:  Positive for shortness of breath. Negative for cough, choking, chest tightness, wheezing and stridor. On oxygen per nasal cannula 2 liters today     Cardiovascular:  Positive for leg swelling. Negative for chest pain. Gastrointestinal:  Negative for abdominal pain. Genitourinary:         Oliguria on hemodialysis   Musculoskeletal:  Positive for arthralgias, back pain, gait problem, joint swelling, myalgias and neck stiffness. Negative for neck pain. No assist devices    Skin: Negative. Neurological:  Positive for weakness and numbness. Psychiatric/Behavioral:  Positive for sleep disturbance. Negative for dysphoric mood. The patient is not nervous/anxious. Objective:     Vitals:    10/05/22 0824   BP: 124/78   Weight: (!) 322 lb (146.1 kg)   Height: 6' 1\" (1.854 m)       Physical Exam  Constitutional:       General: He is not in acute distress. Appearance: Normal appearance. He is obese.  He is not ill-appearing, toxic-appearing or diaphoretic. HENT:      Head: Normocephalic and atraumatic. Right Ear: External ear normal. There is no impacted cerumen. Left Ear: External ear normal. There is no impacted cerumen. Nose: Nose normal. No congestion or rhinorrhea. Mouth/Throat:      Mouth: Mucous membranes are moist.      Pharynx: Oropharynx is clear. Eyes:      Extraocular Movements: Extraocular movements intact. Conjunctiva/sclera: Conjunctivae normal.      Pupils: Pupils are equal, round, and reactive to light. Cardiovascular:      Rate and Rhythm: Normal rate and regular rhythm. Pulses: Normal pulses. Heart sounds: Normal heart sounds. Pulmonary:      Effort: Pulmonary effort is normal. No respiratory distress. Breath sounds: Normal breath sounds. Comments: On 2 liters of oxygen per nasal cannula, diminished   Abdominal:      General: Abdomen is flat. Bowel sounds are normal. There is no distension. Palpations: Abdomen is soft. There is no mass. Tenderness: There is no abdominal tenderness. Hernia: No hernia is present. Genitourinary:     Comments: Oliguria, on hemodialysis  Musculoskeletal:         General: Tenderness present. Right shoulder: Tenderness present. Decreased range of motion. Right wrist: Tenderness present. Decreased range of motion. Left wrist: Tenderness present. Decreased range of motion. Cervical back: Neck supple. Tenderness and bony tenderness present. Muscular tenderness present. Thoracic back: Bony tenderness present. Decreased range of motion. Lumbar back: Tenderness and bony tenderness present. Decreased range of motion. Positive right straight leg raise test and positive left straight leg raise test.        Back:       Right hip: Decreased range of motion. Decreased strength. Left hip: Decreased range of motion. Decreased strength. Right knee: Swelling present.  Decreased range of motion. Tenderness present. Right lower leg: Edema present. Left lower leg: Edema present. Right ankle: Swelling present. Tenderness present. Decreased range of motion. Left ankle: Tenderness present. Skin:     General: Skin is warm and dry. Capillary Refill: Capillary refill takes less than 2 seconds. Neurological:      General: No focal deficit present. Mental Status: He is alert and oriented to person, place, and time. Sensory: Sensory deficit present. Motor: Weakness present. Coordination: Romberg sign positive. Coordination abnormal.      Gait: Gait abnormal.      Deep Tendon Reflexes:      Reflex Scores:       Tricep reflexes are 2+ on the right side and 2+ on the left side. Bicep reflexes are 2+ on the right side and 2+ on the left side. Brachioradialis reflexes are 2+ on the right side and 2+ on the left side. Patellar reflexes are 1+ on the right side and 1+ on the left side. Achilles reflexes are 1+ on the right side and 1+ on the left side. Comments: 4/5/strength bilateral lower extremity    Decreased sensation bilateral feet    Psychiatric:         Mood and Affect: Mood normal.         Behavior: Behavior normal.     SAUNDRA  Patricks test  positive  Yeoman's  or Gaenslen's positive         Assessment:     1. 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 Spinal stenosis of lumbar region with neurogenic claudication    3. Lumbar facet arthropathy    4. Lumbosacral radiculitis    5. Primary osteoarthritis of right knee    6. Chronic pain of right knee    7. Neuropathy    8. CHF (congestive heart failure), NYHA class II, chronic, diastolic (HCC)    9. Chronic kidney disease, stage IV (severe) ([facility] [address]    10. Chronic pain syndrome    11. Chronic, continuous use of opioids            Plan:      OARRS reviewed. Current MED: 45.00  Patient was offered naloxone for home.    Discussed long term side

## 2022-10-10 RX ORDER — DOXEPIN HYDROCHLORIDE 25 MG/1
CAPSULE ORAL
Qty: 60 CAPSULE | Refills: 3 | Status: SHIPPED | OUTPATIENT
Start: 2022-10-10

## 2022-10-14 ENCOUNTER — HOSPITAL ENCOUNTER (OUTPATIENT)
Dept: INTERVENTIONAL RADIOLOGY/VASCULAR | Age: 62
Discharge: HOME | End: 2022-10-14
Payer: MEDICARE

## 2022-10-14 DIAGNOSIS — N18.6 ESRD (END STAGE RENAL DISEASE) (HCC): ICD-10-CM

## 2022-10-14 PROCEDURE — 36598 INJ W/FLUOR EVAL CV DEVICE: CPT

## 2022-10-14 PROCEDURE — 2709999900 IR INTERVENTIONAL RADIOLOGY PROCEDURE REQUEST

## 2022-10-14 PROCEDURE — 6360000002 HC RX W HCPCS: Performed by: RADIOLOGY

## 2022-10-14 PROCEDURE — 6360000004 HC RX CONTRAST MEDICATION: Performed by: RADIOLOGY

## 2022-10-14 RX ORDER — HEPARIN SODIUM (PORCINE) LOCK FLUSH IV SOLN 100 UNIT/ML 100 UNIT/ML
500 SOLUTION INTRAVENOUS ONCE
Status: COMPLETED | OUTPATIENT
Start: 2022-10-14 | End: 2022-10-14

## 2022-10-14 RX ADMIN — HEPARIN SODIUM (PORCINE) LOCK FLUSH IV SOLN 100 UNIT/ML 430 UNITS: 100 SOLUTION at 09:29

## 2022-10-14 RX ADMIN — IOPAMIDOL 15 ML: 612 INJECTION, SOLUTION INTRAVENOUS at 09:24

## 2022-10-14 NOTE — OP NOTE
Department of Radiology  Post Procedure Progress Note      Pre-Procedure Diagnosis:  Malfunctioning dialysis cath     Procedure Performed:  You elias     Anesthesia: local     Findings: successful, both catheter lumens wide open, no clot, no fibrin sheath     Immediate Complications:  None    Estimated Blood Loss: minimal    SEE DICTATED PROCEDURE NOTE FOR COMPLETE DETAILS.     Edison Magaña MD   10/14/2022 9:26 AM

## 2022-10-14 NOTE — PROGRESS NOTES
0910 Pt in specials radiology for dialysis catheter evaluation. Explained procedure to pt and pt verbalizes understanding. 0920 Pt positioned prone on table. 6718 Venogram of dialysis catheter complete. Pt tolerated well.  0924 Heparin dwell in dialysis catheter. 9069 Pt discharged per wheelchair with wife. Offers no complaints. Report called to Dialysis OP-Anne.

## 2022-10-24 ENCOUNTER — OFFICE VISIT (OUTPATIENT)
Dept: CARDIOLOGY CLINIC | Age: 62
End: 2022-10-24
Payer: MEDICARE

## 2022-10-24 VITALS
OXYGEN SATURATION: 94 % | SYSTOLIC BLOOD PRESSURE: 128 MMHG | HEART RATE: 61 BPM | DIASTOLIC BLOOD PRESSURE: 60 MMHG | WEIGHT: 310 LBS | BODY MASS INDEX: 41.08 KG/M2 | HEIGHT: 73 IN

## 2022-10-24 DIAGNOSIS — I50.32 CHF (CONGESTIVE HEART FAILURE), NYHA CLASS II, CHRONIC, DIASTOLIC (HCC): Primary | ICD-10-CM

## 2022-10-24 DIAGNOSIS — I10 ESSENTIAL HYPERTENSION: ICD-10-CM

## 2022-10-24 DIAGNOSIS — N18.6 ESRD (END STAGE RENAL DISEASE) ON DIALYSIS (HCC): ICD-10-CM

## 2022-10-24 DIAGNOSIS — G47.33 OSA (OBSTRUCTIVE SLEEP APNEA): ICD-10-CM

## 2022-10-24 DIAGNOSIS — Z99.2 ESRD (END STAGE RENAL DISEASE) ON DIALYSIS (HCC): ICD-10-CM

## 2022-10-24 PROCEDURE — G8427 DOCREV CUR MEDS BY ELIG CLIN: HCPCS | Performed by: NURSE PRACTITIONER

## 2022-10-24 PROCEDURE — G8417 CALC BMI ABV UP PARAM F/U: HCPCS | Performed by: NURSE PRACTITIONER

## 2022-10-24 PROCEDURE — 99214 OFFICE O/P EST MOD 30 MIN: CPT | Performed by: NURSE PRACTITIONER

## 2022-10-24 PROCEDURE — 3017F COLORECTAL CA SCREEN DOC REV: CPT | Performed by: NURSE PRACTITIONER

## 2022-10-24 PROCEDURE — 4004F PT TOBACCO SCREEN RCVD TLK: CPT | Performed by: NURSE PRACTITIONER

## 2022-10-24 PROCEDURE — G8484 FLU IMMUNIZE NO ADMIN: HCPCS | Performed by: NURSE PRACTITIONER

## 2022-10-24 RX ORDER — SEVELAMER CARBONATE 800 MG/1
1 TABLET, FILM COATED ORAL 3 TIMES DAILY
COMMUNITY
Start: 2022-10-22

## 2022-10-24 ASSESSMENT — ENCOUNTER SYMPTOMS
COUGH: 0
ABDOMINAL DISTENTION: 0
SHORTNESS OF BREATH: 0

## 2022-10-24 NOTE — PATIENT INSTRUCTIONS
You may receive a survey regarding the care you received during your visit. Your input is valuable to us. We encourage you to complete and return your survey. We hope you will choose us in the future for your healthcare needs.     Continue:  Continue current medications  Daily weights and record  Fluid restriction of 2 Liters per day  Limit sodium in diet to around 6593-8148 mg/day  Monitor BP  Activity as tolerated     Call the Heart Failure Clinic for any of the following symptoms: [phone]  Weight gain of 2-3 pounds in 1 day or 5 pounds in 1 week  Increased shortness of breath  Shortness of breath while laying down  Cough  Chest pain  Swelling in feet, ankles or legs  Tenderness or bloating in the abdomen  Fatigue   Decreased appetite or nausea   Confusion

## 2022-10-24 NOTE — PROGRESS NOTES
Heart Failure Clinic       Visit Date: 10/24/2022  Cardiologist:  Dr. Karsten Perea  Primary Care Physician: Dr. Se Way DO    Shira Camacho is a 63 y.o. male who presents today for:  Chief Complaint   Patient presents with    Congestive Heart Failure       HPI:   Shira Camacho is a 63 y.o. male who presents to the office for a follow up patient visit in the heart failure clinic. Accompanied by no one    TYPE HF: HFrecoveredEF (40-45% - improved to 55-60%)  Device: no  HX: HTN, DM, LAURIE (CPAP), CKD Funmilayo Farhad), Partial colectomy (2017), never smoker (wife does)  CardioMEMS implanted 6/16/21 - PAD goal 21-27mmHg, since admission 3/2022 (new anemia, CKD) - PAD goal 27-31  12/1/20= [facility] w/ Nallu = nonobstructive disease, elevated LVEDP, PCW 35mmHg      Dry Wt:  140 kg     Hospitalization:  March 2022 x 2 weeks - Sepsis 2/2 abd wall - mesh. Surgery w/ [facility]augh 3/25/22. Worsening CKD - Nephrology following. Anemia - Hgb 7-8  OV 4/14 - 232# - fatigued. CardioMEMs PAD 33mmHg  4/2022 - CHF - diuresed 10#  7/2022 - CHF exac  8/2022 - CAROLE - K+ 7.0, creat 6.6 - started Hemodialysis T, TH, Sat  Today:  feeling better overall - getting acclimated w/ dialysis routine.    Off all diuretics - urinates very little  Getting around pretty good = some AKINS but feels deconditioned over past year w/ everything    Patient has:  Chest Pain: no  SOB: AKINS  Orthopnea/PND:sleeping in chair still  LAURIE: compliant  Edema: no  Fatigue: improving  Abdominal bloating: no  Cough: no  Appetite: good  Home weight: stable  Home blood pressure: stable    Past Medical History:   Diagnosis Date    Arthritis     Back problem     back pain-sees [facility] pain mgmt    Bladder disease     Dr. Roxanne Negrete    CHF with unknown LVEF ([facility] [address] 05/24/2021    Dr. George/CHF clinic    Chronic kidney disease     Constipation     Diabetes mellitus ([facility] [address]     Dr. Leah Jeffery    Hypertension     Hypertensive emergency 04/11/2019    Hypertensive urgency 04/13/2019    Sleep apnea has bipap-emily Olmedo CNP    Thyroid disease      Past Surgical History:   Procedure Laterality Date    ABDOMEN SURGERY      ABDOMEN SURGERY N/A 3/25/2022    ABDOMINAL LYSIS OF ADHESIONS, EXPLANT OF INFECTED MESH performed by Jt Carrillo MD at [address]      no stents    CARPAL TUNNEL RELEASE Bilateral     COLONOSCOPY  2017    Dr. Javier Kamara, [address]      x3 surgeries with 14 repairs    KNEE SURGERY Right 1980's    cartilage    DE EXPLORATORY OF ABDOMEN N/A 2/5/2018    ABDOMINAL EXPLORATION WITH LYSIS OF COMPLICATED ADHESIONS WITH AN EXTENDED RIGHT COLON RESECTION performed by Jt Carrillo MD at [facility] History   Problem Relation Age of Onset    Cancer Mother         breast    Heart Disease Father         bladder, lung    Cancer Father     Stroke Brother     Heart Attack Brother     Prostate Cancer Brother     Diabetes Paternal Grandmother     Arthritis Brother     High Blood Pressure Neg Hx      Social History     Tobacco Use    Smoking status: Former     Types: Pipe    Smokeless tobacco: Current     Types: Chew    Tobacco comments:     quit pipe over 30 yrs ago   Substance Use Topics    Alcohol use: No     Alcohol/week: 0.0 standard drinks     Comment: quit     Current Outpatient Medications   Medication Sig Dispense Refill    sevelamer (RENVELA) 800 MG tablet Take 1 tablet by mouth 3 times daily      doxepin (SINEQUAN) 25 MG capsule take 2 capsules by mouth at bedtime 60 capsule 3    oxyCODONE-acetaminophen (PERCOCET)  MG per tablet Take 1 tablet by mouth every 8 hours as needed for Pain for up to 30 days.  Intended supply: 30 days 90 tablet 0    hydrALAZINE (APRESOLINE) 50 MG tablet Take 1 tablet by mouth every 8 hours 90 tablet 1    carvedilol (COREG) 25 MG tablet Take 1 tablet by mouth 2 times daily (with meals) 60 tablet 2    tamsulosin (FLOMAX) 0.4 MG capsule Take 1 capsule by mouth daily 30 capsule 2 naloxone 4 MG/0.1ML LIQD nasal spray 1 spray by Nasal route as needed for Opioid Reversal 1 each 0    rOPINIRole (REQUIP) 0.5 MG tablet take 1 tablet by mouth IN THE AFTERNOON AND 2 TABLETS AT NIGHT. 90 tablet 2    CPAP Machine MISC by Does not apply route Please change bipap to 18/14 cm H20. 1 each 0    amitriptyline (ELAVIL) 10 MG tablet Take 10 mg by mouth 2 times daily      levothyroxine (SYNTHROID) 175 MCG tablet Take 1 tablet by mouth Daily 30 tablet 3    melatonin 3 MG TABS tablet Take 1.5 tablets by mouth nightly as needed (Sleep) 45 tablet 3    ipratropium-albuterol (DUONEB) 0.5-2.5 (3) MG/3ML SOLN nebulizer solution Inhale 3 mLs into the lungs every 4 hours as needed for Shortness of Breath 360 mL 1    montelukast (SINGULAIR) 10 MG tablet Take 1 tablet by mouth nightly 90 tablet 3    albuterol sulfate  (90 Base) MCG/ACT inhaler Inhale 2 puffs into the lungs every 6 hours as needed for Wheezing or Shortness of Breath 1 each 5    aspirin EC 81 MG EC tablet Take 1 tablet by mouth daily 90 tablet 1    nitroGLYCERIN (NITROSTAT) 0.4 MG SL tablet Place 1 tablet under the tongue every 5 minutes as needed for Chest pain (up to 3 doses) 25 tablet 3    azelastine (ASTELIN) 137 MCG/SPRAY nasal spray 1 spray by Nasal route as needed. Use in each nostril as directed      gabapentin (NEURONTIN) 300 MG capsule Take 1 capsule by mouth nightly for 30 days. (Patient not taking: Reported on 10/24/2022) 30 capsule 0     No current facility-administered medications for this visit. Allergies   Allergen Reactions    Shellfish-Derived Products Swelling     Tolerates IV dye without any problems      Pcn [Penicillins] Itching    Succinylcholine      Pseudocholinesterase Deficiency    Sulfa Antibiotics Itching    Morphine Nausea And Vomiting     \"head swimming, skin crawling\"    Tape Elwanda Busing Tape] Rash       SUBJECTIVE:   Review of Systems   Constitutional:  Positive for fatigue.  Negative for activity change and appetite change. Respiratory:  Negative for cough and shortness of breath. Cardiovascular:  Negative for chest pain, palpitations and leg swelling. Gastrointestinal:  Negative for abdominal distention. Neurological:  Negative for weakness, light-headedness and headaches. Hematological:  Negative for adenopathy. Psychiatric/Behavioral:  Negative for sleep disturbance. OBJECTIVE:   Today's Vitals:  /60   Pulse 61   Ht 6' 1\" (1.854 m)   Wt (!) 310 lb (140.6 kg)   SpO2 94%   BMI 40.90 kg/m²     Physical Exam  Vitals reviewed. Constitutional:       General: He is not in acute distress. Appearance: Normal appearance. He is well-developed. He is not diaphoretic. HENT:      Head: Normocephalic and atraumatic. Eyes:      Conjunctiva/sclera: Conjunctivae normal.   Neck:      Comments: No JVD  Cardiovascular:      Rate and Rhythm: Normal rate and regular rhythm. Heart sounds: Normal heart sounds. No murmur heard. Pulmonary:      Effort: Pulmonary effort is normal. No respiratory distress. Breath sounds: Normal breath sounds. No wheezing or rales. Abdominal:      General: Bowel sounds are normal. There is no distension. Palpations: Abdomen is soft. Tenderness: There is no abdominal tenderness. Musculoskeletal:         General: Normal range of motion. Cervical back: Normal range of motion and neck supple. Right lower leg: No edema. Left lower leg: No edema. Skin:     General: Skin is warm and dry. Capillary Refill: Capillary refill takes less than 2 seconds. Comments: Dialysis cath RUC - intact, no swelling, drainage or erythema   Neurological:      Mental Status: He is alert and oriented to person, place, and time.       Coordination: Coordination normal.   Psychiatric:         Behavior: Behavior normal.     Wt Readings from Last 3 Encounters:   10/24/22 (!) 310 lb (140.6 kg)   10/05/22 (!) 322 lb (146.1 kg)   08/17/22 (!) 322 lb 12.8 oz (146.4 kg)     BP Readings from Last 3 Encounters:   10/24/22 128/60   10/05/22 124/78   08/17/22 (!) 158/89     Pulse Readings from Last 3 Encounters:   10/24/22 61   08/17/22 70   08/08/22 68     Body mass index is 40.9 kg/m². ECHO   Summary   Ejection fraction is visually estimated at 55%. Overall left ventricular function is normal.      Signature      ----------------------------------------------------------------   Electronically signed by Kat Garnica MD (Interpreting   physician) on 04/22/2022 at 03:37 PM   ----------------------------------------------------------------     ECHO:    Summary   Left ventricle size is normal.   Moderate concentric left ventricular hypertrophy. There were no regional wall motion abnormalities. Ejection fraction is visually estimated in the range of 55% to 60%. Signature      ----------------------------------------------------------------   Electronically signed by Cally Buckley MD (Interpreting   physician) on 05/25/2021 at 04:48 PM   ----------------------------------------------------------------     CATH/STRESS:   RIGHT HEART CATHETERIZATION:  1.  RA is 25.  2.  RV is 80/23, 27.  3.  PA is 82/44, 59.  4.  Wedge pressure is 35.  5.  LV is 188/28, 36.  6.  AO was 205/126, 160.  7.  AO saturation is 91%. 8.  PA saturation is 63%. 9.  Cardiac output by Cherelle method is 5.9. 10.  Cardiac index is 2.1. CORONARY ANATOMY:  1. Right coronary artery is a dominant vessel. The vessel is quite  large and has mild luminal irregularities in the proximal, mid, and  distal portions of the vessel; otherwise, it is patent. 2.  Left main is patent, gives rise to LAD and left circumflex arteries. 3.  Left circumflex artery is patent without any significant disease. 4. LAD is patent in the proximal portion, mid portion there is a 30% to  40% stenosis followed by mild luminal irregularities in the distal  portion of the LAD.   5.  LV gram was not performed due to renal dysfunction. The patient tolerated the procedure well without any significant issues  or complications. Hemostasis was achieved with a Vasc Band device. SUMMARY:  1. Elevated right heart pressures with elevated LVEPD. 2.  Patent coronaries. RECOMMENDATIONS:  1.  IV diuresis. 2.  Monitor electrolytes. 3.  Optimize heart failure therapy. 4.  Weight loss advised. 5.  Optimize sleep apnea therapies. 6.  Follow up in clinic in one to two weeks to evaluate symptoms  further. Reynold Stone MD     D: 12/06/2020 21:57:38      Results reviewed:  BNP: No results found for: BNP  CBC:   Lab Results   Component Value Date/Time    WBC 4.6 08/17/2022 05:32 AM    RBC 2.87 08/17/2022 05:32 AM    RBC 5.15 08/12/2011 08:29 PM    HGB 7.5 08/17/2022 05:32 AM    HCT 24.6 08/17/2022 05:32 AM     08/17/2022 05:32 AM     CMP:    Lab Results   Component Value Date/Time     08/17/2022 05:32 AM    K 4.0 08/17/2022 05:32 AM    K 5.3 08/11/2022 04:56 AM     08/17/2022 05:32 AM    CO2 26 08/17/2022 05:32 AM    BUN 28 08/17/2022 05:32 AM    CREATININE 3.7 08/17/2022 05:32 AM    LABGLOM 17 08/17/2022 05:32 AM    GLUCOSE 103 08/17/2022 05:32 AM    CALCIUM 8.9 08/17/2022 05:32 AM     Hepatic Function Panel:    Lab Results   Component Value Date/Time    ALKPHOS 91 08/08/2022 07:09 PM    ALT 9 08/08/2022 07:09 PM    AST 9 08/08/2022 07:09 PM    PROT 8.4 08/08/2022 07:09 PM    BILITOT 0.3 08/08/2022 07:09 PM    BILIDIR <0.2 04/20/2022 12:50 PM    LABALBU 3.7 08/08/2022 07:09 PM     Magnesium:    Lab Results   Component Value Date/Time    MG 2.5 08/09/2022 06:26 AM     PT/INR:    Lab Results   Component Value Date/Time    INR 1.14 06/16/2021 08:17 AM     Lipids:    Lab Results   Component Value Date/Time    TRIG 107 05/25/2021 03:40 AM    HDL 35 05/25/2021 03:40 AM    LDLCALC 96 05/25/2021 03:40 AM       ASSESSMENT AND PLAN:      Diagnosis Orders   1.  CHF (congestive heart failure), NYHA class II, chronic, diastolic ([facility] [address]        2. ESRD (end stage renal disease) on dialysis ([facility] [address]        3. Essential hypertension        4. LAURIE (obstructive sleep apnea)          Continue:  GDMT:              ACE/ARB/ARNI - none              BB - Coreg 25 BID              SGLT2 -  no  AA - none - stopped  Diuretic - Bumex stopped  Vasodilator - Hydralazine 50 TID  Other - stopped  HFpEF (55%)  ESRD on HD T,TH, Sat  HTN  LAURIE     Stable, appears Euvolemic  BP/HR stable   No med changes today   Continue diet/fluid adherence  Continue daily wts. F/U w/ Cardiology  F/U in clinic in PRN - pt is now on HD, nephrology managing. Tolerating above noted HF meds, no ill side effects noted. Will continue to monitor kidney function and electrolytes. Will optimize as tolerated. Pt is compliant w/ medications. Total visit time of 30 minutes has been spent with patient on education of symptoms, management, medication, and plan of care; as well as review of chart: labs, ECHO, radiology reports, etc.   I personally spent more then 50% of the appt time face to face with the patient. Daily weights  Fluid restriction of 2 Liters per day  Limit sodium in diet to around 9380-9590 mg/day  Monitor BP  Activity as tolerated     Patient was instructed to call the [facility] for any changes in symptoms as noted in AVS.      No follow-ups on file. or sooner if needed     Patient given educational materials - see patient instructions. We discussed the importance of weighing oneself and recording daily. We also discussed the importance of a low sodium diet, higher sodium foods to avoid and better low sodium food options. Patient verbalizes understanding of plan of care using teach back method, and is agreeable to the treatment plan.        Electronically signed by SAHIL Recinos CNP on 10/24/2022 at 12:39 PM

## 2022-10-31 ENCOUNTER — HOSPITAL ENCOUNTER (OUTPATIENT)
Dept: INTERVENTIONAL RADIOLOGY/VASCULAR | Age: 62
Discharge: HOME | End: 2022-10-31
Payer: MEDICARE

## 2022-10-31 DIAGNOSIS — Z99.2 ESRD ON DIALYSIS (HCC): ICD-10-CM

## 2022-10-31 DIAGNOSIS — N18.6 ESRD ON DIALYSIS (HCC): ICD-10-CM

## 2022-10-31 PROCEDURE — 93971 EXTREMITY STUDY: CPT

## 2022-11-01 DIAGNOSIS — G89.4 CHRONIC PAIN SYNDROME: ICD-10-CM

## 2022-11-01 RX ORDER — ROPINIROLE 0.5 MG/1
TABLET, FILM COATED ORAL
Qty: 90 TABLET | Refills: 5 | Status: SHIPPED | OUTPATIENT
Start: 2022-11-01

## 2022-11-01 RX ORDER — GABAPENTIN 300 MG/1
300 CAPSULE ORAL NIGHTLY
Qty: 30 CAPSULE | Refills: 0 | Status: SHIPPED | OUTPATIENT
Start: 2022-11-04 | End: 2022-12-04

## 2022-11-01 NOTE — TELEPHONE ENCOUNTER
OARRS reviewed. UDS: + for  Gabapentin, Oxycodone. Amitriptyline and Doxepin present. Last seen: 10/5/2022.  Follow-up:   Future Appointments   Date Time Provider Ismael Haas   11/14/2022  3:00 PM SAHIL Rouse - [address]   12/14/2022  8:00 AM SAHIL Kong - CNP N SRPX Pain [place] [address] [address]   12/19/2022  9:30 AM STR ULTRASOUND RM 2 STRZ US STR Radiolog   1/5/2023  2:45 PM Renita Mosley MD N SRPX Heart [facility] - [facility] CULP AM [place] [address]   4/13/2023 12:45 PM Lenard Chavez  [facility]

## 2022-11-02 RX ORDER — OXYCODONE AND ACETAMINOPHEN 10; 325 MG/1; MG/1
1 TABLET ORAL EVERY 8 HOURS PRN
Qty: 90 TABLET | Refills: 0 | Status: SHIPPED | OUTPATIENT
Start: 2022-11-02 | End: 2022-12-02

## 2022-11-02 NOTE — TELEPHONE ENCOUNTER
OARRS reviewed. UDS: + for  Gabapentin, Oxycodone  -consistent. + for  Amitriptyline , Doxepin -inconsistent. Last seen: 10/5/2022.  Follow-up: 12/14/2022

## 2022-11-09 ENCOUNTER — OFFICE VISIT (OUTPATIENT)
Dept: PULMONOLOGY | Age: 62
End: 2022-11-09
Payer: MEDICARE

## 2022-11-09 VITALS
DIASTOLIC BLOOD PRESSURE: 60 MMHG | WEIGHT: 310.2 LBS | SYSTOLIC BLOOD PRESSURE: 110 MMHG | TEMPERATURE: 98.2 F | BODY MASS INDEX: 41.11 KG/M2 | HEIGHT: 73 IN | OXYGEN SATURATION: 95 % | HEART RATE: 78 BPM

## 2022-11-09 DIAGNOSIS — E66.01 MORBID OBESITY WITH BMI OF 40.0-44.9, ADULT (HCC): ICD-10-CM

## 2022-11-09 DIAGNOSIS — G25.81 RLS (RESTLESS LEGS SYNDROME): ICD-10-CM

## 2022-11-09 DIAGNOSIS — G47.33 OSA (OBSTRUCTIVE SLEEP APNEA): Primary | ICD-10-CM

## 2022-11-09 DIAGNOSIS — G47.09 OTHER INSOMNIA: ICD-10-CM

## 2022-11-09 DIAGNOSIS — I50.32 CHRONIC DIASTOLIC HEART FAILURE (HCC): ICD-10-CM

## 2022-11-09 DIAGNOSIS — J96.11 CHRONIC RESPIRATORY FAILURE WITH HYPOXIA (HCC): ICD-10-CM

## 2022-11-09 PROCEDURE — 3078F DIAST BP <80 MM HG: CPT

## 2022-11-09 PROCEDURE — 3074F SYST BP LT 130 MM HG: CPT

## 2022-11-09 PROCEDURE — G8417 CALC BMI ABV UP PARAM F/U: HCPCS

## 2022-11-09 PROCEDURE — 4004F PT TOBACCO SCREEN RCVD TLK: CPT

## 2022-11-09 PROCEDURE — G8482 FLU IMMUNIZE ORDER/ADMIN: HCPCS

## 2022-11-09 PROCEDURE — G8427 DOCREV CUR MEDS BY ELIG CLIN: HCPCS

## 2022-11-09 PROCEDURE — 99214 OFFICE O/P EST MOD 30 MIN: CPT

## 2022-11-09 PROCEDURE — 3017F COLORECTAL CA SCREEN DOC REV: CPT

## 2022-11-09 ASSESSMENT — ENCOUNTER SYMPTOMS
SINUS PAIN: 0
COUGH: 0
SINUS PRESSURE: 0
SHORTNESS OF BREATH: 1
RHINORRHEA: 0
WHEEZING: 0

## 2022-11-09 NOTE — PROGRESS NOTES
[facility]      Shira Camacho         [id number]  11/9/2022   Chief Complaint   Patient presents with    Follow-up     4 month follow up after pressure change, no download, patient not wearing machine. Pt of [place], [place]- [place]     PAP Download:   Original or initial AHI: 49     Date of initial study: 9/12/15        Neck Size: 20.75 inches  Mallampati 4  ESS:  14  SAQLI: 42    Here is a scan of the most recent download:  **NO DOWNLOAD, PATIENT NOT WEARING MACHINE**    Presentation:   Pee Cortez presents for sleep medicine follow up for obstructive sleep apnea  Since the last visit, Farhad's sleep symptoms are not controlled as he has not been wearing his BiPAP. Patient reports that he was hospitalized in August and has not been wearing his BiPAP since August. He reports that his machine is upstairs and he has not been able to go upstairs to sleep. He states that he almost fell when he attempted to go upstairs. He has been sleeping in a recliner in his living room and only has 2 outlets in that room. He reports that his bedroom has 5 bedrooms but all are located upstairs. He has been wearing 2 lpm at nighttime instead of his BiPAP. His last nocturnal study revealed that patient did not require supplemental O2 while on BiPAP. He reports that he has not started on Dialysis with plans for fistula placement. He currently has a tunneled catheter and is afraid to sleep with his wife with this catheter as she is very restless during her sleep and tosses and turns. He reports that he wore his BiPAP with new pressure settings of 18/14 from about Herminia Millin to August with great benefit and felt that the new settings were working well. He reports that his RLS is well controlled on Requip. He reports today that he stopped his Elavil. He has been taking Doxepin and melatonin for his insomnia with great benefit. Weight lost 18 lbs over 6 months     Equipment issues:   The pressure is  acceptable, the mask is acceptable     Sleep issues:  Do you feel better? Yes  More rested? Sometimes   Better concentration? some  Difficulty falling sleep? Yes  Difficulty staying asleep? Yes  Snoring? Yes    Progress History:   Since last visit any new medical issues? Yes CKD - started on dialysis  New ER or hospital visits? Yes - hospitalization in August - CHF and CKD  Any new sleep medicines? Yes melatonin    Review of Systems -   Review of Systems   Constitutional:  Positive for appetite change (\"hit and miss with dialysis\"). Negative for fever. HENT:  Positive for congestion (intermittent). Negative for postnasal drip, rhinorrhea, sinus pressure, sinus pain and sneezing. Respiratory:  Positive for shortness of breath (\"sometimes\" - does not last). Negative for cough and wheezing. Cardiovascular:  Positive for leg swelling (improved). Negative for chest pain and palpitations. Allergic/Immunologic: Positive for environmental allergies. Physical Exam:    BMI:  Body mass index is 40.93 kg/m². Wt Readings from Last 3 Encounters:   11/09/22 (!) 310 lb 3.2 oz (140.7 kg)   10/24/22 (!) 310 lb (140.6 kg)   10/05/22 (!) 322 lb (146.1 kg)     Vitals: /60 (Site: Left Upper Arm, Position: Sitting, Cuff Size: Medium Adult)   Pulse 78   Temp 98.2 °F (36.8 °C) (Oral)   Ht 6' 1\" (1.854 m)   Wt (!) 310 lb 3.2 oz (140.7 kg)   SpO2 95% Comment: Patient on room air  BMI 40.93 kg/m²       Physical Exam  Constitutional:       General: He is not in acute distress. Appearance: He is obese. Comments: BMI 40.93   HENT:      Head: Normocephalic and atraumatic. Right Ear: External ear normal.      Left Ear: External ear normal.   Eyes:      General:         Right eye: No discharge. Left eye: No discharge. Cardiovascular:      Rate and Rhythm: Normal rate. Pulmonary:      Effort: Pulmonary effort is normal. No respiratory distress. Breath sounds:  No wheezing, rhonchi or rales.   Musculoskeletal:      Cervical back: Neck supple. Skin:     General: Skin is warm and dry. Neurological:      General: No focal deficit present. Mental Status: He is alert. Psychiatric:         Mood and Affect: Mood normal.         Behavior: Behavior normal.         Thought Content: Thought content normal.         ASSESSMENT/DIAGNOSIS     Diagnosis Orders   1. LAURIE (obstructive sleep apnea)        2. Morbid obesity with BMI of 40.0-44.9, adult ([facility] [address]        3. Chronic diastolic heart failure ([facility] [address]        4. RLS (restless legs syndrome)        5. Chronic respiratory failure with hypoxia (HCC)        6. Other insomnia                 Plan   Do you need any equipment today? No  -Patient's symptoms are not well controlled as he is not compliantly wearing his BiPAP. No download was pulled for this appointment as he has not been wearing his machine for months  -Strongly advised patient to resume wearing his BiPAP. Advised patient on dangers of not compliantly wearing his BiPAP including poorer outcomes as well as death. Advised patient that his BiPAP may be taken by his insurance company if he is not compliantly wearing his machine. Patient verbalized understanding and reports that he will start wearing his BiPAP again. Advised patient we will continue current settings for now as he felt that his symptoms were well controlled on settings of 18/14. Advised patient to call with any difficulty with resuming BiPAP therapy. -Advised patient that he may need to consider moving to a one story home.  -Advised patient to continue Requip for RLS as his RLS is well controlled on Requip.  -Patient stopped his Elavil and has been taking Doxepin and melatonin for his insomnia with good relief. Advised patient that he may continue Doxepin and melatonin   -Advised to continue current positive airway pressure therapy with above described pressure.    -Advised to keep good compliance with current recommended pressure to get optimal results and clinical improvement  -Recommend 7-9 hours of sleep with PAP  -Instructed to call DME company regarding supplies if needed.   -Patient to call office for earlier appointment if needed for worsening of sleep symptoms. -Advised patient not to drive if feeling sleepy  -Discussed weight loss. Patient has lost 18 lbs since last appointment. Needs to continue to work on weight loss  -Patient is on 2 lpm for respiratory failure due to CHF. Advised patient to continue oxygen as prescribed by his CHF provider  -Educated about my impression and plan. Patient verbalizes understanding. Will see Shelley Trejo back in: 3 months with download. Information added by my medical assistant/LPN was reviewed today.     Electronically signed by SAHIL Simmons CNP on 11/9/2022 at 1:28 PM

## 2022-11-30 RX ORDER — HYDRALAZINE HYDROCHLORIDE 50 MG/1
50 TABLET, FILM COATED ORAL EVERY 8 HOURS SCHEDULED
Qty: 90 TABLET | Refills: 1 | OUTPATIENT
Start: 2022-11-30 | End: 2022-12-30

## 2022-12-02 DIAGNOSIS — G89.4 CHRONIC PAIN SYNDROME: ICD-10-CM

## 2022-12-03 DIAGNOSIS — G89.4 CHRONIC PAIN SYNDROME: ICD-10-CM

## 2022-12-05 ENCOUNTER — OFFICE VISIT (OUTPATIENT)
Dept: CARDIOTHORACIC SURGERY | Age: 62
End: 2022-12-05
Payer: MEDICARE

## 2022-12-05 ENCOUNTER — TELEPHONE (OUTPATIENT)
Dept: CARDIOLOGY CLINIC | Age: 62
End: 2022-12-05

## 2022-12-05 VITALS
DIASTOLIC BLOOD PRESSURE: 83 MMHG | WEIGHT: 309.8 LBS | SYSTOLIC BLOOD PRESSURE: 130 MMHG | BODY MASS INDEX: 41.06 KG/M2 | HEIGHT: 73 IN | HEART RATE: 63 BPM

## 2022-12-05 DIAGNOSIS — N18.4 CHRONIC KIDNEY DISEASE, STAGE IV (SEVERE) (HCC): Primary | ICD-10-CM

## 2022-12-05 PROCEDURE — G8482 FLU IMMUNIZE ORDER/ADMIN: HCPCS | Performed by: THORACIC SURGERY (CARDIOTHORACIC VASCULAR SURGERY)

## 2022-12-05 PROCEDURE — G8417 CALC BMI ABV UP PARAM F/U: HCPCS | Performed by: THORACIC SURGERY (CARDIOTHORACIC VASCULAR SURGERY)

## 2022-12-05 PROCEDURE — G8427 DOCREV CUR MEDS BY ELIG CLIN: HCPCS | Performed by: THORACIC SURGERY (CARDIOTHORACIC VASCULAR SURGERY)

## 2022-12-05 PROCEDURE — 4004F PT TOBACCO SCREEN RCVD TLK: CPT | Performed by: THORACIC SURGERY (CARDIOTHORACIC VASCULAR SURGERY)

## 2022-12-05 PROCEDURE — 3074F SYST BP LT 130 MM HG: CPT | Performed by: THORACIC SURGERY (CARDIOTHORACIC VASCULAR SURGERY)

## 2022-12-05 PROCEDURE — 3078F DIAST BP <80 MM HG: CPT | Performed by: THORACIC SURGERY (CARDIOTHORACIC VASCULAR SURGERY)

## 2022-12-05 PROCEDURE — 3017F COLORECTAL CA SCREEN DOC REV: CPT | Performed by: THORACIC SURGERY (CARDIOTHORACIC VASCULAR SURGERY)

## 2022-12-05 PROCEDURE — 99204 OFFICE O/P NEW MOD 45 MIN: CPT | Performed by: THORACIC SURGERY (CARDIOTHORACIC VASCULAR SURGERY)

## 2022-12-05 RX ORDER — GABAPENTIN 300 MG/1
300 CAPSULE ORAL NIGHTLY
Qty: 30 CAPSULE | Refills: 0 | OUTPATIENT
Start: 2022-12-05 | End: 2023-01-04

## 2022-12-05 RX ORDER — OXYCODONE AND ACETAMINOPHEN 10; 325 MG/1; MG/1
1 TABLET ORAL EVERY 8 HOURS PRN
Qty: 90 TABLET | Refills: 0 | Status: SHIPPED | OUTPATIENT
Start: 2022-12-05 | End: 2023-01-04

## 2022-12-05 RX ORDER — GABAPENTIN 300 MG/1
300 CAPSULE ORAL NIGHTLY
Qty: 30 CAPSULE | Refills: 0 | Status: SHIPPED | OUTPATIENT
Start: 2022-12-05 | End: 2023-01-04

## 2022-12-05 NOTE — TELEPHONE ENCOUNTER
OARRS reviewed. UDS: + for  oxycodone gabapentin consistent. Last seen: 10/5/2022.  Follow-up:   Future Appointments   Date Time Provider Ismael Haas   12/14/2022  8:00 AM SAHIL Bower CNP N SRPX Pain [facility] - [facility] [place] [address]   12/19/2022  9:30 AM STR ULTRASOUND RM 2 STRZ US STR Radiolog   1/5/2023  2:45 PM Jamin Crocker MD N SRPX Heart [facility] - [facility] [place] [address]   2/10/2023 12:00 PM SAHIL Tsang CNP [facility] [place] [address]   4/13/2023 12:45 PM Emile Miramontes  [facility]

## 2022-12-05 NOTE — TELEPHONE ENCOUNTER
OARRS reviewed. UDS: + for Gabapentin, Oxycodone, Amitriptyline, Doxepin. Last seen: 10/5/2022.  Follow-up: 12/14/22

## 2022-12-05 NOTE — TELEPHONE ENCOUNTER
Patient will be scheduled for upcoming AV fistula creation for dialysis with Dr. Trudi Merlin, possibly 12/29/22. Patient last seen by Dr. Yolanda Bagley 7/07/22. May patient be cleared for surgery? Please advise - thanks!

## 2022-12-06 ENCOUNTER — TELEPHONE (OUTPATIENT)
Dept: CARDIOTHORACIC SURGERY | Age: 62
End: 2022-12-06

## 2022-12-06 ASSESSMENT — ENCOUNTER SYMPTOMS
SHORTNESS OF BREATH: 1
WHEEZING: 1
NAUSEA: 1
BACK PAIN: 1
DIARRHEA: 1

## 2022-12-06 NOTE — TELEPHONE ENCOUNTER
Deandra Contreras is scheduled for LEFT AVF CREATION with DR Rehana Bautista on 12/19/22 at 1130am.    PRIOR AUTHORIZATION:    NO AUTH REQUIRED  CPT: 73579  ICD: N18.4  DOS: 12/19/22

## 2022-12-06 NOTE — PROGRESS NOTES
[facility] SURGERY  [address] [facility] [address] [address] [facility]  Dept: [phone]  Dept Fax: [phone]: [phone]    Visit Date: 12/5/2022    Mr. Tangela Matos is a 63 y.o.male  who presented for:  Chief Complaint   Patient presents with    New Patient     New Patient, referred from Dr. Saurabh Reid for dialysis fistula        HPI:   HPI     63year old male with PMHx of ESRD on HD, HTN, RA, CHF, LAURIE, Obesity, and DM2 who presents for evaluation of new AVF creation. The patient was a long term CKD patient. The CKD progressed through stage 4 this past summer, and he began HD via a right IJ permacath on 8/10/2022. The permacath needed exchange for occlusion on 10/14/22. His schedule is Tu-Th-Sa, and he is right hand dominant. The HD is not progressing smoothly, as his permacath does occlude frequently. He was considered for a Wave-link percutaneous access per EMR, but patient is unaware of this. No previous AVF/AVG. Vein mapping performed:    Left Cephalic 2.7 at wrist, but >3.5 mm above elbow  Left Basilic >9.9 mm above elbow. Brachial artery >5.0 mm    Current Outpatient Medications:     oxyCODONE-acetaminophen (PERCOCET)  MG per tablet, Take 1 tablet by mouth every 8 hours as needed for Pain for up to 30 days.  Intended supply: 30 days, Disp: 90 tablet, Rfl: 0    rOPINIRole (REQUIP) 0.5 MG tablet, take 1 tablet by mouth IN THE AFTERNOON AND 2 TABLETS AT NIGHT., Disp: 90 tablet, Rfl: 5    sevelamer (RENVELA) 800 MG tablet, Take 1 tablet by mouth 3 times daily, Disp: , Rfl:     doxepin (SINEQUAN) 25 MG capsule, take 2 capsules by mouth at bedtime, Disp: 60 capsule, Rfl: 3    hydrALAZINE (APRESOLINE) 50 MG tablet, Take 1 tablet by mouth every 8 hours (Patient taking differently: Take 100 mg by mouth every 8 hours), Disp: 90 tablet, Rfl: 1    carvedilol (COREG) 25 MG tablet, Take 1 tablet by mouth 2 times daily (with meals) (Patient taking differently: Take 50 mg by mouth 2 times daily (with meals) two 25 mg tablets bid), Disp: 60 tablet, Rfl: 2    tamsulosin (FLOMAX) 0.4 MG capsule, Take 1 capsule by mouth daily, Disp: 30 capsule, Rfl: 2    naloxone 4 MG/0.1ML LIQD nasal spray, 1 spray by Nasal route as needed for Opioid Reversal, Disp: 1 each, Rfl: 0    CPAP Machine MISC, by Does not apply route Please change bipap to 18/14 cm H20., Disp: 1 each, Rfl: 0    amitriptyline (ELAVIL) 10 MG tablet, Take 10 mg by mouth 2 times daily, Disp: , Rfl:     levothyroxine (SYNTHROID) 175 MCG tablet, Take 1 tablet by mouth Daily, Disp: 30 tablet, Rfl: 3    ipratropium-albuterol (DUONEB) 0.5-2.5 (3) MG/3ML SOLN nebulizer solution, Inhale 3 mLs into the lungs every 4 hours as needed for Shortness of Breath, Disp: 360 mL, Rfl: 1    montelukast (SINGULAIR) 10 MG tablet, Take 1 tablet by mouth nightly, Disp: 90 tablet, Rfl: 3    albuterol sulfate  (90 Base) MCG/ACT inhaler, Inhale 2 puffs into the lungs every 6 hours as needed for Wheezing or Shortness of Breath, Disp: 1 each, Rfl: 5    aspirin EC 81 MG EC tablet, Take 1 tablet by mouth daily, Disp: 90 tablet, Rfl: 1    nitroGLYCERIN (NITROSTAT) 0.4 MG SL tablet, Place 1 tablet under the tongue every 5 minutes as needed for Chest pain (up to 3 doses), Disp: 25 tablet, Rfl: 3    azelastine (ASTELIN) 137 MCG/SPRAY nasal spray, 1 spray by Nasal route as needed. Use in each nostril as directed, Disp: , Rfl:     gabapentin (NEURONTIN) 300 MG capsule, Take 1 capsule by mouth nightly for 30 days. , Disp: 30 capsule, Rfl: 0    melatonin 3 MG TABS tablet, Take 1.5 tablets by mouth nightly as needed (Sleep), Disp: 45 tablet, Rfl: 3    Allergies   Allergen Reactions    Shellfish-Derived Products Swelling     Tolerates IV dye without any problems      Pcn [Penicillins] Itching    Succinylcholine      Pseudocholinesterase Deficiency    Sulfa Antibiotics Itching    Morphine Nausea And Vomiting     \"head swimming, skin crawling\"    Tape Massiel An Tape] Rash       Past Medical History  Harinder Liriano  has a past medical history of Arthritis, Back problem, Bladder disease, CHF with unknown LVEF ([address] Chronic kidney disease, Constipation, Diabetes mellitus ([address] Hypertension, Hypertensive emergency, Hypertensive urgency, Sleep apnea, and Thyroid disease. Social History  Harinder Liriano  reports that he has quit smoking. His smoking use included pipe. His smokeless tobacco use includes chew. He reports that he does not drink alcohol and does not use drugs. Family History  Harinder Liriano family history includes Arthritis in his brother; Cancer in his father and mother; Diabetes in his paternal grandmother; Heart Attack in his brother; Heart Disease in his father; Prostate Cancer in his brother; Stroke in his brother. There is no family history of bicuspid aortic valve, aneurysms, heart transplant, pacemakers, defibrillators, or sudden cardiac death. Past Surgical History   Past Surgical History:   Procedure Laterality Date    ABDOMEN SURGERY      ABDOMEN SURGERY N/A 3/25/2022    ABDOMINAL LYSIS OF ADHESIONS, EXPLANT OF INFECTED MESH performed by Jamal Yates MD at [address]      no stents    CARPAL TUNNEL RELEASE Bilateral     COLONOSCOPY  2017    Dr. Haider Dorantes, [address]      x3 surgeries with 14 repairs    KNEE SURGERY Right 1980's    cartilage    IL EXPLORATORY OF ABDOMEN N/A 2/5/2018    ABDOMINAL EXPLORATION WITH LYSIS OF COMPLICATED ADHESIONS WITH AN EXTENDED RIGHT COLON RESECTION performed by Jamal Yates MD at [address] [facility] [address]:     Review of Systems   Constitutional:  Negative for activity change and appetite change. Respiratory:  Positive for shortness of breath and wheezing. Cardiovascular:  Positive for leg swelling. Gastrointestinal:  Positive for diarrhea and nausea.    Musculoskeletal:  Positive for arthralgias, back pain and joint swelling. Neurological: Negative. Hematological: Negative. Psychiatric/Behavioral: Negative. Objective:     /83   Pulse 63   Ht 6' 1\" (1.854 m)   Wt (!) 309 lb 12.8 oz (140.5 kg)   BMI 40.87 kg/m²     Wt Readings from Last 3 Encounters:   12/05/22 (!) 309 lb 12.8 oz (140.5 kg)   11/09/22 (!) 310 lb 3.2 oz (140.7 kg)   10/24/22 (!) 310 lb (140.6 kg)     BP Readings from Last 3 Encounters:   12/05/22 130/83   11/09/22 110/60   10/24/22 128/60       Physical Exam  Constitutional:       Appearance: Normal appearance. He is obese. HENT:      Head: Normocephalic and atraumatic. Cardiovascular:      Rate and Rhythm: Normal rate and regular rhythm. Pulses: Normal pulses. Pulmonary:      Effort: Pulmonary effort is normal.      Breath sounds: Wheezing present. Abdominal:      General: Abdomen is flat. Palpations: Abdomen is soft. Comments: +midline scars, well-healed. Musculoskeletal:      Right lower leg: Edema present. Left lower leg: Edema present. Skin:     General: Skin is warm and dry. Neurological:      General: No focal deficit present. Mental Status: He is alert and oriented to person, place, and time.    Psychiatric:         Mood and Affect: Mood normal.         Behavior: Behavior normal.       Lab Results   Component Value Date/Time    WBC 4.6 08/17/2022 05:32 AM    RBC 2.87 08/17/2022 05:32 AM    RBC 5.15 08/12/2011 08:29 PM    HGB 7.5 08/17/2022 05:32 AM    HCT 24.6 08/17/2022 05:32 AM    MCV 85.7 08/17/2022 05:32 AM    MCH 26.1 08/17/2022 05:32 AM    MCHC 30.5 08/17/2022 05:32 AM    RDW 17.8 08/01/2022 10:13 AM     08/17/2022 05:32 AM    MPV 8.5 08/17/2022 05:32 AM       Lab Results   Component Value Date/Time     08/17/2022 05:32 AM    K 4.0 08/17/2022 05:32 AM    K 5.3 08/11/2022 04:56 AM     08/17/2022 05:32 AM    CO2 26 08/17/2022 05:32 AM    BUN 28 08/17/2022 05:32 AM    LABALBU 3.7 08/08/2022 07:09 PM    CREATININE 3.7 08/17/2022 05:32 AM    CALCIUM 8.9 08/17/2022 05:32 AM    LABGLOM 17 08/17/2022 05:32 AM    GLUCOSE 103 08/17/2022 05:32 AM       Lab Results   Component Value Date/Time    ALKPHOS 91 08/08/2022 07:09 PM    ALT 9 08/08/2022 07:09 PM    AST 9 08/08/2022 07:09 PM    PROT 8.4 08/08/2022 07:09 PM    BILITOT 0.3 08/08/2022 07:09 PM    BILIDIR <0.2 04/20/2022 12:50 PM    LABALBU 3.7 08/08/2022 07:09 PM       Lab Results   Component Value Date/Time    MG 2.5 08/09/2022 06:26 AM       Lab Results   Component Value Date    INR 1.14 (H) 06/16/2021    INR 1.06 12/01/2020    INR 1.03 07/02/2019         Lab Results   Component Value Date/Time    LABA1C 4.7 05/02/2022 02:47 PM       Lab Results   Component Value Date/Time    TRIG 107 05/25/2021 03:40 AM    HDL 35 05/25/2021 03:40 AM    LDLCALC 96 05/25/2021 03:40 AM       Lab Results   Component Value Date/Time    TSH 3.090 04/15/2021 12:20 PM           Assessment/Plan     1. Chronic kidney disease, stage IV (severe) ([facility])      Schedule cardiac clearance from his cardiologist.  Schedule left upper extremity AVF creation, most likely will be left brachio-cephalic AVF. Return for Left upper extremity AVF creation.       Electronically signed by Cara Obregon MD   12/6/2022 at 6:46 AM EST

## 2022-12-07 ENCOUNTER — PREP FOR PROCEDURE (OUTPATIENT)
Dept: CARDIOTHORACIC SURGERY | Age: 62
End: 2022-12-07

## 2022-12-07 DIAGNOSIS — Z01.818 PREOP TESTING: Primary | ICD-10-CM

## 2022-12-07 RX ORDER — SODIUM CHLORIDE 9 MG/ML
INJECTION, SOLUTION INTRAVENOUS PRN
OUTPATIENT
Start: 2022-12-07

## 2022-12-07 RX ORDER — SODIUM CHLORIDE 0.9 % (FLUSH) 0.9 %
5-40 SYRINGE (ML) INJECTION PRN
OUTPATIENT
Start: 2022-12-07

## 2022-12-07 RX ORDER — SODIUM CHLORIDE 0.9 % (FLUSH) 0.9 %
5-40 SYRINGE (ML) INJECTION EVERY 12 HOURS SCHEDULED
OUTPATIENT
Start: 2022-12-07

## 2022-12-09 NOTE — PROGRESS NOTES
Follow all instructions given by your physician  NPO after midnight   Sips of water am of surgery with allowed medications  Bring insurance info and 's license  Wear comfortable clean, loose fitting clothing  No jewelry or contact lenses to be worn day of surgery  No glue on dentures morning of surgery;you will be asked to remove them for surgery. Case for glasses. Shower night before and morning of surgery with a liquid antibacterial soap, dry with fresh clean towel; no lotions, creams or powder. Clean sheets and pillow case on bed night before surgery  Bring medications in original bottles  Bring CPAP/BIPAP machine if you have one ( you may be charged if one is needed in recovery room )    Please refer to the SSI-Surgical Site Infection Flyer you hopefully received in the mail-together we can prevent infections; signs and symptoms reviewed. When discharged be sure you understand how to care for your wound and that you have the phone # to call if you have any concerns or questions about your wound.  needed at discharge and someone over 18 to stay with you for 24 hours overnight (surgery may be cancelled if you don't have this)  Report to [facility] on 2nd floor  If you would become ill prior to surgery, please call the surgeon  May have a visitor with you, we request that you limit to 2 visitors in pre-op area  Masks are recommended but not required, new masks at entrance desk  Call [phone] for any questions    Covid questionnaire Complete; Patient negative for symptoms or exposure. See documentation.

## 2022-12-12 ENCOUNTER — OFFICE VISIT (OUTPATIENT)
Dept: PHYSICAL MEDICINE AND REHAB | Age: 62
End: 2022-12-12
Payer: MEDICARE

## 2022-12-12 VITALS
BODY MASS INDEX: 41.08 KG/M2 | WEIGHT: 310 LBS | HEIGHT: 73 IN | DIASTOLIC BLOOD PRESSURE: 68 MMHG | SYSTOLIC BLOOD PRESSURE: 124 MMHG

## 2022-12-12 DIAGNOSIS — M47.816 LUMBAR FACET ARTHROPATHY: ICD-10-CM

## 2022-12-12 DIAGNOSIS — G89.29 CHRONIC PAIN OF LEFT KNEE: ICD-10-CM

## 2022-12-12 DIAGNOSIS — M25.562 CHRONIC PAIN OF LEFT KNEE: ICD-10-CM

## 2022-12-12 DIAGNOSIS — M25.561 CHRONIC PAIN OF RIGHT KNEE: ICD-10-CM

## 2022-12-12 DIAGNOSIS — M48.062 SPINAL STENOSIS OF LUMBAR REGION WITH NEUROGENIC CLAUDICATION: ICD-10-CM

## 2022-12-12 DIAGNOSIS — I50.32 CHF (CONGESTIVE HEART FAILURE), NYHA CLASS II, CHRONIC, DIASTOLIC (HCC): ICD-10-CM

## 2022-12-12 DIAGNOSIS — G62.9 NEUROPATHY: ICD-10-CM

## 2022-12-12 DIAGNOSIS — N18.4 CHRONIC KIDNEY DISEASE, STAGE IV (SEVERE) (HCC): ICD-10-CM

## 2022-12-12 DIAGNOSIS — M47.816 SPONDYLOSIS OF LUMBAR REGION WITHOUT MYELOPATHY OR RADICULOPATHY: Primary | ICD-10-CM

## 2022-12-12 DIAGNOSIS — M54.17 LUMBOSACRAL RADICULITIS: ICD-10-CM

## 2022-12-12 DIAGNOSIS — M17.11 PRIMARY OSTEOARTHRITIS OF RIGHT KNEE: ICD-10-CM

## 2022-12-12 DIAGNOSIS — G89.4 CHRONIC PAIN SYNDROME: ICD-10-CM

## 2022-12-12 DIAGNOSIS — G89.29 CHRONIC PAIN OF RIGHT KNEE: ICD-10-CM

## 2022-12-12 DIAGNOSIS — F11.90 CHRONIC, CONTINUOUS USE OF OPIOIDS: ICD-10-CM

## 2022-12-12 PROCEDURE — G8417 CALC BMI ABV UP PARAM F/U: HCPCS | Performed by: NURSE PRACTITIONER

## 2022-12-12 PROCEDURE — 4004F PT TOBACCO SCREEN RCVD TLK: CPT | Performed by: NURSE PRACTITIONER

## 2022-12-12 PROCEDURE — 99214 OFFICE O/P EST MOD 30 MIN: CPT | Performed by: NURSE PRACTITIONER

## 2022-12-12 PROCEDURE — G8482 FLU IMMUNIZE ORDER/ADMIN: HCPCS | Performed by: NURSE PRACTITIONER

## 2022-12-12 PROCEDURE — G8427 DOCREV CUR MEDS BY ELIG CLIN: HCPCS | Performed by: NURSE PRACTITIONER

## 2022-12-12 PROCEDURE — 3017F COLORECTAL CA SCREEN DOC REV: CPT | Performed by: NURSE PRACTITIONER

## 2022-12-12 PROCEDURE — 3078F DIAST BP <80 MM HG: CPT | Performed by: NURSE PRACTITIONER

## 2022-12-12 PROCEDURE — 3074F SYST BP LT 130 MM HG: CPT | Performed by: NURSE PRACTITIONER

## 2022-12-12 ASSESSMENT — ENCOUNTER SYMPTOMS
COUGH: 0
ABDOMINAL PAIN: 1
BACK PAIN: 1
CHOKING: 0
STRIDOR: 0
DIARRHEA: 1
SHORTNESS OF BREATH: 1
CHEST TIGHTNESS: 0
WHEEZING: 0

## 2022-12-12 NOTE — PROGRESS NOTES
[address] [facility] [address]  Dept: [phone]  Dept Fax: [phone]: [phone]    Visit Date: 12/12/2022    Functionality Assessment/Goals Worksheet     On a scale of 0 (Does not Interfere) to 10 (Completely Interferes)     1. Which number describes how during the past week pain has interfered with       the following:  A. General Activity:  8  B. Mood: 10  C. Walking Ability:  8  D. Normal Work (Includes both work outside the home and housework):  8  E. Relations with Other People:   10  F. Sleep:   10  G. Enjoyment of Life:   10    2. Patient Prefers to Take their Pain Medications:     []  On a regular basis   [x]  Only when necessary    []  Does not take pain medications    3. What are the Patient's Goals/Expectations for Visiting Pain Management? []  Learn about my pain    [x]  Receive Medication   []  Physical Therapy     []  Treat Depression   [x]  Receive Injections    []  Treat Sleep   []  Deal with Anxiety and Stress   []  Treat Opoid Dependence/Addiction   []  Other:      HPI:   Adriana Parra is a 63 y.o. male is here today for    Chief Complaint: Low back pain, leg pain, knee pain     HPI   10 week FU. Continues to have pain in low back- constant aching pain \"toothache\" with some throbbing\". Pain intermittently radiates down buttocks and legs numbness tingling. Pain is more bothersome in low back. Continues to have pain in bilateral knees- aching stabbing states left knee now hurting a lot and right knee was his main issue   Pain is up and down depending on activity. Continues dialysis and awaiting AV fistula placement in left arm next week 12/19/2022. Having low blood pressure issues during dialysis    Pain increases with bending, lifting, twisting , walking, standing, getting up and down, and housework or working at job.   Continues oxygen 2 liters not wearing at this time. Current medications remain effective  in making pain more tolerable. Medications reviewed. Patient denies side effects with medications. Patient states he is taking medications as prescribed. Hedenies receiving pain medications from other sources. He denies any ER visits since last visit. Pain scale with out pain medications or at its worst is 8-10/10. Pain scale with pain medications or at its best is 5-6/10. Last dose of Percocet was today and Neurontin was last week ran ourt states he just has not had the funds. Drug screen reviewed from 10/5/2022 and was appropriate  Pill count completed  today and WNL: Yes      The patientis allergic to shellfish-derived products, pcn [penicillins], succinylcholine, sulfa antibiotics, morphine, and tape [adhesive tape]. Subjective:      Review of Systems   Constitutional:  Positive for appetite change, fatigue and unexpected weight change. Negative for chills and fever. Respiratory:  Positive for shortness of breath. Negative for cough, choking, chest tightness, wheezing and stridor. On oxygen per nasal cannula 2 liters not wearing now   Cardiovascular:  Positive for leg swelling. Negative for chest pain. Gastrointestinal:  Positive for abdominal pain and diarrhea. Genitourinary:         Oliguria on hemodialysis   Musculoskeletal:  Positive for arthralgias, back pain, gait problem, joint swelling, myalgias and neck stiffness. Negative for neck pain. No assist devices    Skin: Negative. Neurological:  Positive for weakness and numbness. Psychiatric/Behavioral:  Positive for dysphoric mood and sleep disturbance. The patient is not nervous/anxious. Objective:     Vitals:    12/12/22 1218   BP: 124/68   Weight: (!) 310 lb (140.6 kg)   Height: 6' 1\" (1.854 m)       Physical Exam  Constitutional:       General: He is not in acute distress. Appearance: Normal appearance. He is obese.  He is not ill-appearing, toxic-appearing or diaphoretic. HENT:      Head: Normocephalic and atraumatic. Right Ear: External ear normal. There is no impacted cerumen. Left Ear: External ear normal. There is no impacted cerumen. Nose: Nose normal. No congestion or rhinorrhea. Mouth/Throat:      Mouth: Mucous membranes are moist.      Pharynx: Oropharynx is clear. Eyes:      Extraocular Movements: Extraocular movements intact. Conjunctiva/sclera: Conjunctivae normal.      Pupils: Pupils are equal, round, and reactive to light. Cardiovascular:      Rate and Rhythm: Normal rate and regular rhythm. Pulses: Normal pulses. Heart sounds: Normal heart sounds. Pulmonary:      Effort: Pulmonary effort is normal. No respiratory distress. Breath sounds: Normal breath sounds. Comments: diminished   Abdominal:      General: Abdomen is flat. Bowel sounds are normal. There is no distension. Palpations: Abdomen is soft. There is no mass. Tenderness: There is no abdominal tenderness. Hernia: No hernia is present. Genitourinary:     Comments: Oliguria, on hemodialysis  Musculoskeletal:         General: Tenderness present. Right shoulder: Tenderness present. Decreased range of motion. Right wrist: Tenderness present. Decreased range of motion. Left wrist: Tenderness present. Decreased range of motion. Cervical back: Neck supple. Tenderness and bony tenderness present. Muscular tenderness present. Thoracic back: Bony tenderness present. Decreased range of motion. Lumbar back: Tenderness and bony tenderness present. Decreased range of motion. Positive right straight leg raise test and positive left straight leg raise test.        Back:       Right hip: Bony tenderness present. Decreased range of motion. Decreased strength. Left hip: Bony tenderness present. Decreased range of motion. Decreased strength.       Right knee: Swelling and bony tenderness present. Decreased range of motion. Tenderness present. Left knee: Bony tenderness present. Decreased range of motion. Tenderness present. Right lower leg: Edema present. Left lower leg: Edema present. Right ankle: Swelling present. Tenderness present. Decreased range of motion. Left ankle: Tenderness present. Skin:     General: Skin is warm and dry. Capillary Refill: Capillary refill takes less than 2 seconds. Neurological:      General: No focal deficit present. Mental Status: He is alert and oriented to person, place, and time. Sensory: Sensory deficit present. Motor: Weakness present. Coordination: Romberg sign positive. Coordination abnormal.      Gait: Gait abnormal.      Deep Tendon Reflexes:      Reflex Scores:       Tricep reflexes are 2+ on the right side and 2+ on the left side. Bicep reflexes are 2+ on the right side and 2+ on the left side. Brachioradialis reflexes are 2+ on the right side and 2+ on the left side. Patellar reflexes are 1+ on the right side and 1+ on the left side. Achilles reflexes are 1+ on the right side and 1+ on the left side. Comments: 4/5/strength bilateral lower extremity    Decreased sensation bilateral feet    Psychiatric:         Mood and Affect: Mood normal.         Behavior: Behavior normal.     SAUNDRA  Patricks test  positive  Yeoman's  or Gaenslen's positive       Assessment:     1. 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 Lumbar facet arthropathy    3. Spinal stenosis of lumbar region with neurogenic claudication    4. Lumbosacral radiculitis    5. Primary osteoarthritis of right knee    6. Chronic pain of right knee    7. Neuropathy    8. CHF (congestive heart failure), NYHA class II, chronic, diastolic (HCC)    9. Chronic kidney disease, stage IV (severe) ([facility] [address]    10. Chronic pain syndrome    11. Chronic, continuous use of opioids    12. Chronic pain of left knee            Plan:      OARRS reviewed. Current MED: 45.00  Patient was offered naloxone for home. Discussed long term side effects of medications, tolerance, dependency and addiction. Previous UDS reviewed  UDS preformed today for compliance. Patient told can not receive any pain medications from any other source. No evidence of abuse, diversion or aberrant behavior. Medications and/or procedures to improve function and quality of life- patient understanding with this and that may not be pain free  Discussed with patient about safe storage of medications at home  Discussed possible weaning of medication dosing dependent on treatment/procedure results. Discussed with patient about risks with procedure including infection, reaction to medication, increased pain, or bleeding. Discussed procedures in future when patient is more medically stable. Not a candidate at this time. Awaiting AV fistula placement next week-  L-facet MBBJOSÉ LUIS, right knee injection  Instructed to get updated low back xray. Also added left knee xray due to pain   Continue pain medications Percocet 10/325 TID prn- Neurontin 300 mg at bedtime. limited because of kidney disease. Refills sent 12/5/2022 and awaiting to fill. Patient is compliant    Meds. Prescribed:   No orders of the defined types were placed in this encounter. Return in about 10 weeks (around 2/20/2023), or if symptoms worsen or fail to improve, for follow up  for medications.                Electronically signed by SAHIL Lynch CNP on12/12/2022 at 12:45 PM

## 2022-12-15 NOTE — PROGRESS NOTES
Teams chat Jasbir Guadarrama with Dr Joann Hines.  We are still waiting results of lab and chest xray.

## 2022-12-15 NOTE — PROGRESS NOTES
Diomedes Thompson from Dr Valentina Dunbar office said that the lab and xray will done on admission for surgery

## 2022-12-19 ENCOUNTER — HOSPITAL ENCOUNTER (OUTPATIENT)
Age: 62
Setting detail: OUTPATIENT SURGERY
Discharge: NURSING, REHAB OR LONG-TERM CARE | End: 2022-12-27
Attending: THORACIC SURGERY (CARDIOTHORACIC VASCULAR SURGERY)
Payer: MEDICARE

## 2022-12-19 VITALS
SYSTOLIC BLOOD PRESSURE: 135 MMHG | WEIGHT: 301.8 LBS | HEIGHT: 73 IN | TEMPERATURE: 97.1 F | RESPIRATION RATE: 16 BRPM | BODY MASS INDEX: 40 KG/M2 | DIASTOLIC BLOOD PRESSURE: 75 MMHG | HEART RATE: 77 BPM | OXYGEN SATURATION: 93 %

## 2022-12-19 LAB
ANION GAP SERPL CALCULATED.3IONS-SCNC: 11 MEQ/L (ref 8–16)
BUN BLDV-MCNC: 27 MG/DL (ref 7–22)
CALCIUM SERPL-MCNC: 9.4 MG/DL (ref 8.5–10.5)
CHLORIDE BLD-SCNC: 96 MEQ/L (ref 98–111)
CO2: 27 MEQ/L (ref 23–33)
CREAT SERPL-MCNC: 6 MG/DL (ref 0.4–1.2)
GFR SERPL CREATININE-BSD FRML MDRD: 10 ML/MIN/1.73M2
GLUCOSE BLD-MCNC: 89 MG/DL (ref 70–108)
POTASSIUM REFLEX MAGNESIUM: 4 MEQ/L (ref 3.5–5.2)
SODIUM BLD-SCNC: 134 MEQ/L (ref 135–145)

## 2022-12-19 PROCEDURE — 36415 COLL VENOUS BLD VENIPUNCTURE: CPT

## 2022-12-19 PROCEDURE — 80048 BASIC METABOLIC PNL TOTAL CA: CPT

## 2022-12-19 RX ORDER — SODIUM CHLORIDE 9 MG/ML
INJECTION, SOLUTION INTRAVENOUS PRN
Status: DISCONTINUED | OUTPATIENT
Start: 2022-12-19 | End: 2022-12-27 | Stop reason: HOSPADM

## 2022-12-19 RX ORDER — SODIUM CHLORIDE 0.9 % (FLUSH) 0.9 %
5-40 SYRINGE (ML) INJECTION PRN
Status: DISCONTINUED | OUTPATIENT
Start: 2022-12-19 | End: 2022-12-27 | Stop reason: HOSPADM

## 2022-12-19 RX ORDER — SODIUM CHLORIDE 0.9 % (FLUSH) 0.9 %
5-40 SYRINGE (ML) INJECTION EVERY 12 HOURS SCHEDULED
Status: DISCONTINUED | OUTPATIENT
Start: 2022-12-19 | End: 2022-12-27 | Stop reason: HOSPADM

## 2022-12-19 ASSESSMENT — PAIN DESCRIPTION - DESCRIPTORS: DESCRIPTORS: ACHING

## 2022-12-19 ASSESSMENT — PAIN - FUNCTIONAL ASSESSMENT
PAIN_FUNCTIONAL_ASSESSMENT: 0-10
PAIN_FUNCTIONAL_ASSESSMENT: 0-10

## 2022-12-19 NOTE — PROGRESS NOTES
Phlebotomy rin labs from patients left AC. Left arm was not tagged from office with pink band indicating not to use arm for anything. Patient states that he phlebotomist not to use that arm and she said that it should be okay and went on and used that arm. Dr. Caio Coulter made aware of the situation. He states that he will assess the arm and then make his decision then if he will preceded with the procedure or reschedule.

## 2022-12-19 NOTE — PROGRESS NOTES
Pt in same say awaiting doctor to see right arm access for fistula insertion. Needle puncture was done for lab draw amd site developed small hema parminder  .  Will continue to monitor and keep pt updated

## 2022-12-19 NOTE — H&P
CT/CV Surgery H&P    12/18/2022 10:37 PM  Surgeon:  Dr. Shelby Puente    HPI:    Mr. Stanford Rodrigues is scheduled for Left upper extremity AV fistula creation on 12/19/22. In review, he is a 63year old male with PMHx of ESRD on HD, HTN, RA, CHF, LAURIE, Obesity, and DM2 who presents for evaluation of new AVF creation. The patient was a long term CKD patient. The CKD progressed through stage 4 this past summer, and he began HD via a right IJ permacath on 8/10/2022. The permacath needed exchange for occlusion on 10/14/22. His schedule is Tu-Th-Sa, and he is right hand dominant. The HD is not progressing smoothly, as his permacath does occlude frequently. He was considered for a Wave-link percutaneous access per EMR, but patient is unaware of this. No previous AVF/AVG. Vein mapping performed:     Left Cephalic 2.7 at wrist, but >3.5 mm above elbow  Left Basilic >8.7 mm above elbow. Brachial artery >5.0 mm    Vital Signs: Ht 6' 1\" (1.854 m)   Wt (!) 310 lb (140.6 kg)   BMI 40.90 kg/m²    No data recorded. Labs:   CBC: No results for input(s): WBC, HGB, HCT, MCV, PLT, APTT, PROTIME, INR in the last 72 hours. BMP: No results for input(s): NA, K, CL, CO2, PHOS, BUN, CREATININE, CA, MG, POCGLU in the last 72 hours. Last HgA1C:   Lab Results   Component Value Date    LABA1C 4.7 05/02/2022     Imaging:  Results for orders placed during the hospital encounter of 03/03/22    XR CHEST (2 VW)    Narrative  PROCEDURE: XR CHEST (2 VW)    CLINICAL INFORMATION: CHF (congestive heart failure), NYHA class II, chronic, diastolic ([facility] [address] COMPARISON: Radiograph 5/24/2021. TECHNIQUE: 2 PA and one lateral view of the chest were obtained. FINDINGS:  There are some opacities at the right lung base. There is cardiomegaly. The pulmonary vasculature is within normal limits. There is no significant pleural effusion or pneumothorax. Visualized portions of the upper abdomen are within normal limits.     The osseous structures are intact. No acute fractures or suspicious osseous lesions. Impression  Patchy opacities at the right lung base which could be due to infiltrates or atelectasis. **This report has been created using voice recognition software. It may contain minor errors which are inherent in voice recognition technology. **    Final report electronically signed by Dr Saniya Jeffery on 3/3/2022 4:33 PM    Home Meds:  Prior to Admission medications    Medication Sig Start Date End Date Taking? Authorizing Provider   gabapentin (NEURONTIN) 300 MG capsule Take 1 capsule by mouth nightly for 30 days. 12/5/22 1/4/23  SAHIL Pastrana CNP   oxyCODONE-acetaminophen (PERCOCET)  MG per tablet Take 1 tablet by mouth every 8 hours as needed for Pain for up to 30 days. Intended supply: 30 days 12/5/22 1/4/23  SAHIL Pastrana CNP   rOPINIRole (REQUIP) 0.5 MG tablet take 1 tablet by mouth IN THE AFTERNOON AND 2 TABLETS AT NIGHT.  11/1/22   Ayala Dennison PA-C   sevelamer (RENVELA) 800 MG tablet Take 1 tablet by mouth 3 times daily 10/22/22   Historical Provider, MD   doxepin (SINEQUAN) 25 MG capsule take 2 capsules by mouth at bedtime 10/10/22   Ayala Dennison PA-C   hydrALAZINE (APRESOLINE) 50 MG tablet Take 1 tablet by mouth every 8 hours  Patient taking differently: Take 100 mg by mouth every 8 hours 8/17/22 12/5/22  Luisito Martini DO   carvedilol (COREG) 25 MG tablet Take 1 tablet by mouth 2 times daily (with meals)  Patient taking differently: Take 50 mg by mouth 2 times daily (with meals) two 25 mg tablets bid 8/17/22 12/9/22  Luisito Martini DO   tamsulosin (FLOMAX) 0.4 MG capsule Take 1 capsule by mouth daily 8/17/22   Mickey Bravo DO   naloxone 4 MG/0.1ML LIQD nasal spray 1 spray by Nasal route as needed for Opioid Reversal 7/27/22   SAHIL Pastrana CNP   potassium chloride (K-TAB) 10 MEQ extended release tablet Take 2 tablets by mouth in the morning. 7/22/22 8/17/22  Rose Fernandez MD bumetanide (BUMEX) 1 MG tablet Take 1 tablet by mouth in the morning and 1 tablet before bedtime. 7/22/22 8/17/22  Alex Barclay MD   CPAP Machine MISC by Does not apply route Please change bipap to 18/14 cm H20. 5/31/22   Ethel Cervantes PA-C   amitriptyline (ELAVIL) 10 MG tablet Take 10 mg by mouth 2 times daily    Historical Provider, MD   levothyroxine (SYNTHROID) 175 MCG tablet Take 1 tablet by mouth Daily 3/31/22   Aline Vargas,    melatonin 3 MG TABS tablet Take 1.5 tablets by mouth nightly as needed (Sleep) 3/30/22 12/9/22  Aline Vargas DO   amLODIPine (NORVASC) 10 MG tablet Take 1 tablet by mouth daily  Patient not taking: No sig reported 3/31/22 8/17/22  Aline Vargas DO   ipratropium-albuterol (DUONEB) 0.5-2.5 (3) MG/3ML SOLN nebulizer solution Inhale 3 mLs into the lungs every 4 hours as needed for Shortness of Breath 3/21/22   Kalyani Tavarez DO   montelukast (SINGULAIR) 10 MG tablet Take 1 tablet by mouth nightly 2/15/22   SAHIL Peraza CNP   albuterol sulfate  (90 Base) MCG/ACT inhaler Inhale 2 puffs into the lungs every 6 hours as needed for Wheezing or Shortness of Breath 2/15/22   SAHIL Peraza CNP   aspirin EC 81 MG EC tablet Take 1 tablet by mouth daily 9/10/21   SAHIL Murphy CNP   nitroGLYCERIN (NITROSTAT) 0.4 MG SL tablet Place 1 tablet under the tongue every 5 minutes as needed for Chest pain (up to 3 doses) 6/7/21   ASHIL Murphy CNP   glimepiride (AMARYL) 4 MG tablet Take 4 mg by mouth daily  1/16/21 8/17/22  Historical Provider, MD   azelastine (ASTELIN) 137 MCG/SPRAY nasal spray 1 spray by Nasal route as needed.  Use in each nostril as directed    Historical Provider, MD     PastMedical History:  Marcelle Yepez  has a past medical history of Arthritis, Back problem, Bladder disease, CHF with unknown LVEF ([facility] [address] Chronic kidney disease, Constipation, Diabetes mellitus ([facility] [address] Hypertension, Hypertensive emergency, Hypertensive urgency, Sleep apnea, and Thyroid disease. Past Surgical History:  The patient  has a past surgical history that includes Appendectomy; knee surgery (Right, 1980's); Carpal tunnel release (Bilateral); Colonoscopy (2017); hernia repair; pr exploratory of abdomen (N/A, 2/5/2018); Dilatation, esophagus; Abdomen surgery; Cardiac catheterization; and Abdomen surgery (N/A, 3/25/2022). Allergies: The patient is allergic to shellfish-derived products, pcn [penicillins], succinylcholine, sulfa antibiotics, morphine, and tape [adhesive tape]. Family History: This patient's family history includes Arthritis in his brother; Cancer in his father and mother; Diabetes in his paternal grandmother; Heart Attack in his brother; Heart Disease in his father; Prostate Cancer in his brother; Stroke in his brother. Social History:  Carmenza Brumfield  reports that he has never smoked. His smokeless tobacco use includes chew. He reports that he does not drink alcohol and does not use drugs. ROS:  Constitutional:  Negative for activity change and appetite change. Respiratory:  Positive for shortness of breath and wheezing. Cardiovascular:  Positive for leg swelling. Gastrointestinal:  Positive for diarrhea and nausea. Musculoskeletal:  Positive for arthralgias, back pain and joint swelling. Neurological: Negative. Hematological: Negative. Psychiatric/Behavioral: Negative. Physical Exam:     Constitutional:       Appearance: Normal appearance. He is obese. HENT:      Head: Normocephalic and atraumatic. Cardiovascular:      Rate and Rhythm: Normal rate and regular rhythm. Pulses: Normal pulses. Pulmonary:      Effort: Pulmonary effort is normal.      Breath sounds: Wheezing present. Abdominal:      General: Abdomen is flat. Palpations: Abdomen is soft. Comments: +midline scars, well-healed. Musculoskeletal:      Right lower leg: Edema present. Left lower leg: Edema present.    Skin:     General: Skin is warm and dry.   Neurological:      General: No focal deficit present. Mental Status: He is alert and oriented to person, place, and time. Psychiatric:         Mood and Affect: Mood normal.         Behavior: Behavior normal.     Assessment:    CKD IV; needs new permanent AV access. Plan: 12/18/22  Schedule left upper extremity AVF creation, most likely will be left brachio-cephalic AVF. I had a discussion in the office with the patient about his diagnosis, the expected procedure involved, and all risks/benefits/alternatives and the possible complications. After this discussion, the patient understood all risks and wished to proceed.      Electronically signed by Madelyn Jaquez MD on 12/18/2022 at 10:37 PM

## 2022-12-21 ENCOUNTER — TELEPHONE (OUTPATIENT)
Dept: CARDIOTHORACIC SURGERY | Age: 62
End: 2022-12-21

## 2022-12-21 NOTE — TELEPHONE ENCOUNTER
Fistula surgery cancelled on 12/19 because needle puncture done for lab draw in left arm and hematoma developed. Per Dr. Zachary Gee, ok to reschedule for Tuesday 12/27/22. OR time of 1030am, arrival time of 830am.    Spoke with Joanna from [facility]. Patient does in-home dialysis Tues, Thurs, Sat. Patient will need to do dialysis on Mon 12/26 prior to surgery, per Dr. Sabino Torrez recommendation. Spoke with patient's wife Zev Soto (on HIPAA), informed her of date/time for surgery. She states patient has not been taking his ASA. He is not to continue it prior to surgery. NPO after midnight the night before. Transportation needed for d/c home. Brothers Brittany voiced understanding.

## 2022-12-22 ENCOUNTER — PREP FOR PROCEDURE (OUTPATIENT)
Dept: CARDIOTHORACIC SURGERY | Age: 62
End: 2022-12-22

## 2022-12-22 RX ORDER — SODIUM CHLORIDE 9 MG/ML
INJECTION, SOLUTION INTRAVENOUS PRN
Status: CANCELLED | OUTPATIENT
Start: 2022-12-22

## 2022-12-22 RX ORDER — SODIUM CHLORIDE 0.9 % (FLUSH) 0.9 %
5-40 SYRINGE (ML) INJECTION EVERY 12 HOURS SCHEDULED
Status: CANCELLED | OUTPATIENT
Start: 2022-12-22

## 2022-12-22 RX ORDER — SODIUM CHLORIDE 0.9 % (FLUSH) 0.9 %
5-40 SYRINGE (ML) INJECTION PRN
Status: CANCELLED | OUTPATIENT
Start: 2022-12-22

## 2022-12-27 ENCOUNTER — HOSPITAL ENCOUNTER (OUTPATIENT)
Age: 62
Setting detail: OUTPATIENT SURGERY
Discharge: HOME | End: 2022-12-27
Attending: THORACIC SURGERY (CARDIOTHORACIC VASCULAR SURGERY) | Admitting: THORACIC SURGERY (CARDIOTHORACIC VASCULAR SURGERY)
Payer: MEDICARE

## 2022-12-27 ENCOUNTER — ANESTHESIA EVENT (OUTPATIENT)
Dept: OPERATING ROOM | Age: 62
End: 2022-12-27
Payer: MEDICARE

## 2022-12-27 ENCOUNTER — ANESTHESIA (OUTPATIENT)
Dept: OPERATING ROOM | Age: 62
End: 2022-12-27
Payer: MEDICARE

## 2022-12-27 VITALS
HEART RATE: 73 BPM | OXYGEN SATURATION: 94 % | HEIGHT: 73 IN | RESPIRATION RATE: 18 BRPM | TEMPERATURE: 98.1 F | WEIGHT: 303.8 LBS | DIASTOLIC BLOOD PRESSURE: 61 MMHG | BODY MASS INDEX: 40.26 KG/M2 | SYSTOLIC BLOOD PRESSURE: 110 MMHG

## 2022-12-27 PROBLEM — N18.4 STAGE 4 CHRONIC KIDNEY DISEASE (HCC): Status: ACTIVE | Noted: 2022-12-27

## 2022-12-27 LAB
ABO: NORMAL
ANION GAP SERPL CALCULATED.3IONS-SCNC: 18 MEQ/L (ref 8–16)
ANTIBODY SCREEN: NORMAL
BUN BLDV-MCNC: 53 MG/DL (ref 7–22)
CALCIUM SERPL-MCNC: 8.3 MG/DL (ref 8.5–10.5)
CHLORIDE BLD-SCNC: 101 MEQ/L (ref 98–111)
CO2: 24 MEQ/L (ref 23–33)
CREAT SERPL-MCNC: 8.1 MG/DL (ref 0.4–1.2)
GFR SERPL CREATININE-BSD FRML MDRD: 7 ML/MIN/1.73M2
GLUCOSE BLD-MCNC: 102 MG/DL (ref 70–108)
POTASSIUM REFLEX MAGNESIUM: 3.9 MEQ/L (ref 3.5–5.2)
RH FACTOR: NORMAL
SODIUM BLD-SCNC: 143 MEQ/L (ref 135–145)

## 2022-12-27 PROCEDURE — 2580000003 HC RX 258: Performed by: PHYSICIAN ASSISTANT

## 2022-12-27 PROCEDURE — 7100000000 HC PACU RECOVERY - FIRST 15 MIN: Performed by: THORACIC SURGERY (CARDIOTHORACIC VASCULAR SURGERY)

## 2022-12-27 PROCEDURE — 2580000003 HC RX 258: Performed by: THORACIC SURGERY (CARDIOTHORACIC VASCULAR SURGERY)

## 2022-12-27 PROCEDURE — 80048 BASIC METABOLIC PNL TOTAL CA: CPT

## 2022-12-27 PROCEDURE — 6360000002 HC RX W HCPCS: Performed by: THORACIC SURGERY (CARDIOTHORACIC VASCULAR SURGERY)

## 2022-12-27 PROCEDURE — 6360000002 HC RX W HCPCS

## 2022-12-27 PROCEDURE — 2709999900 HC NON-CHARGEABLE SUPPLY: Performed by: THORACIC SURGERY (CARDIOTHORACIC VASCULAR SURGERY)

## 2022-12-27 PROCEDURE — 2580000003 HC RX 258: Performed by: NURSE ANESTHETIST, CERTIFIED REGISTERED

## 2022-12-27 PROCEDURE — 3600000003 HC SURGERY LEVEL 3 BASE: Performed by: THORACIC SURGERY (CARDIOTHORACIC VASCULAR SURGERY)

## 2022-12-27 PROCEDURE — 7100000001 HC PACU RECOVERY - ADDTL 15 MIN: Performed by: THORACIC SURGERY (CARDIOTHORACIC VASCULAR SURGERY)

## 2022-12-27 PROCEDURE — A4217 STERILE WATER/SALINE, 500 ML: HCPCS | Performed by: THORACIC SURGERY (CARDIOTHORACIC VASCULAR SURGERY)

## 2022-12-27 PROCEDURE — 6360000002 HC RX W HCPCS: Performed by: ANESTHESIOLOGY

## 2022-12-27 PROCEDURE — 86850 RBC ANTIBODY SCREEN: CPT

## 2022-12-27 PROCEDURE — 2500000003 HC RX 250 WO HCPCS: Performed by: THORACIC SURGERY (CARDIOTHORACIC VASCULAR SURGERY)

## 2022-12-27 PROCEDURE — 6360000002 HC RX W HCPCS: Performed by: NURSE ANESTHETIST, CERTIFIED REGISTERED

## 2022-12-27 PROCEDURE — 3700000001 HC ADD 15 MINUTES (ANESTHESIA): Performed by: THORACIC SURGERY (CARDIOTHORACIC VASCULAR SURGERY)

## 2022-12-27 PROCEDURE — 7100000011 HC PHASE II RECOVERY - ADDTL 15 MIN: Performed by: THORACIC SURGERY (CARDIOTHORACIC VASCULAR SURGERY)

## 2022-12-27 PROCEDURE — 3600000013 HC SURGERY LEVEL 3 ADDTL 15MIN: Performed by: THORACIC SURGERY (CARDIOTHORACIC VASCULAR SURGERY)

## 2022-12-27 PROCEDURE — 36415 COLL VENOUS BLD VENIPUNCTURE: CPT

## 2022-12-27 PROCEDURE — 86900 BLOOD TYPING SEROLOGIC ABO: CPT

## 2022-12-27 PROCEDURE — 3700000000 HC ANESTHESIA ATTENDED CARE: Performed by: THORACIC SURGERY (CARDIOTHORACIC VASCULAR SURGERY)

## 2022-12-27 PROCEDURE — 2720000010 HC SURG SUPPLY STERILE: Performed by: THORACIC SURGERY (CARDIOTHORACIC VASCULAR SURGERY)

## 2022-12-27 PROCEDURE — 6370000000 HC RX 637 (ALT 250 FOR IP): Performed by: PHYSICIAN ASSISTANT

## 2022-12-27 PROCEDURE — 7100000010 HC PHASE II RECOVERY - FIRST 15 MIN: Performed by: THORACIC SURGERY (CARDIOTHORACIC VASCULAR SURGERY)

## 2022-12-27 PROCEDURE — 86901 BLOOD TYPING SEROLOGIC RH(D): CPT

## 2022-12-27 RX ORDER — LIDOCAINE HYDROCHLORIDE 20 MG/ML
INJECTION, SOLUTION INTRAVENOUS PRN
Status: DISCONTINUED | OUTPATIENT
Start: 2022-12-27 | End: 2022-12-27 | Stop reason: SDUPTHER

## 2022-12-27 RX ORDER — FENTANYL CITRATE 50 UG/ML
INJECTION, SOLUTION INTRAMUSCULAR; INTRAVENOUS
Status: COMPLETED
Start: 2022-12-27 | End: 2022-12-27

## 2022-12-27 RX ORDER — FENTANYL CITRATE 50 UG/ML
50 INJECTION, SOLUTION INTRAMUSCULAR; INTRAVENOUS EVERY 5 MIN PRN
Status: DISCONTINUED | OUTPATIENT
Start: 2022-12-27 | End: 2022-12-27 | Stop reason: HOSPADM

## 2022-12-27 RX ORDER — MIDAZOLAM HYDROCHLORIDE 1 MG/ML
INJECTION INTRAMUSCULAR; INTRAVENOUS PRN
Status: DISCONTINUED | OUTPATIENT
Start: 2022-12-27 | End: 2022-12-27 | Stop reason: SDUPTHER

## 2022-12-27 RX ORDER — MORPHINE SULFATE 2 MG/ML
2 INJECTION, SOLUTION INTRAMUSCULAR; INTRAVENOUS ONCE
Status: COMPLETED | OUTPATIENT
Start: 2022-12-27 | End: 2022-12-27

## 2022-12-27 RX ORDER — SODIUM CHLORIDE 0.9 % (FLUSH) 0.9 %
5-40 SYRINGE (ML) INJECTION EVERY 12 HOURS SCHEDULED
Status: DISCONTINUED | OUTPATIENT
Start: 2022-12-27 | End: 2022-12-27 | Stop reason: HOSPADM

## 2022-12-27 RX ORDER — OXYCODONE AND ACETAMINOPHEN 10; 325 MG/1; MG/1
1 TABLET ORAL ONCE
Status: COMPLETED | OUTPATIENT
Start: 2022-12-27 | End: 2022-12-27

## 2022-12-27 RX ORDER — SODIUM CHLORIDE 9 MG/ML
INJECTION, SOLUTION INTRAVENOUS PRN
Status: DISCONTINUED | OUTPATIENT
Start: 2022-12-27 | End: 2022-12-27 | Stop reason: HOSPADM

## 2022-12-27 RX ORDER — LIDOCAINE HYDROCHLORIDE 10 MG/ML
INJECTION, SOLUTION INFILTRATION; PERINEURAL PRN
Status: DISCONTINUED | OUTPATIENT
Start: 2022-12-27 | End: 2022-12-27 | Stop reason: ALTCHOICE

## 2022-12-27 RX ORDER — SODIUM CHLORIDE 0.9 % (FLUSH) 0.9 %
5-40 SYRINGE (ML) INJECTION PRN
Status: DISCONTINUED | OUTPATIENT
Start: 2022-12-27 | End: 2022-12-27 | Stop reason: HOSPADM

## 2022-12-27 RX ORDER — SODIUM CHLORIDE 9 MG/ML
INJECTION, SOLUTION INTRAVENOUS CONTINUOUS PRN
Status: DISCONTINUED | OUTPATIENT
Start: 2022-12-27 | End: 2022-12-27 | Stop reason: SDUPTHER

## 2022-12-27 RX ORDER — MORPHINE SULFATE 2 MG/ML
INJECTION, SOLUTION INTRAMUSCULAR; INTRAVENOUS
Status: COMPLETED
Start: 2022-12-27 | End: 2022-12-27

## 2022-12-27 RX ORDER — HEPARIN SODIUM 1000 [USP'U]/ML
INJECTION, SOLUTION INTRAVENOUS; SUBCUTANEOUS PRN
Status: DISCONTINUED | OUTPATIENT
Start: 2022-12-27 | End: 2022-12-27 | Stop reason: SDUPTHER

## 2022-12-27 RX ORDER — PROPOFOL 10 MG/ML
INJECTION, EMULSION INTRAVENOUS CONTINUOUS PRN
Status: DISCONTINUED | OUTPATIENT
Start: 2022-12-27 | End: 2022-12-27 | Stop reason: SDUPTHER

## 2022-12-27 RX ADMIN — CEFAZOLIN 3000 MG: 10 INJECTION, POWDER, FOR SOLUTION INTRAVENOUS; PARENTERAL at 12:07

## 2022-12-27 RX ADMIN — OXYCODONE AND ACETAMINOPHEN 1 TABLET: 10; 325 TABLET ORAL at 16:02

## 2022-12-27 RX ADMIN — SODIUM CHLORIDE: 9 INJECTION, SOLUTION INTRAVENOUS at 11:49

## 2022-12-27 RX ADMIN — HEPARIN SODIUM 2500 UNITS: 1000 INJECTION INTRAVENOUS; SUBCUTANEOUS at 13:25

## 2022-12-27 RX ADMIN — MORPHINE SULFATE 2 MG: 2 INJECTION, SOLUTION INTRAMUSCULAR; INTRAVENOUS at 09:51

## 2022-12-27 RX ADMIN — HEPARIN SODIUM 5000 UNITS: 1000 INJECTION INTRAVENOUS; SUBCUTANEOUS at 13:18

## 2022-12-27 RX ADMIN — LIDOCAINE HYDROCHLORIDE 60 MG: 20 INJECTION, SOLUTION INTRAVENOUS at 11:53

## 2022-12-27 RX ADMIN — FENTANYL CITRATE 50 MCG: 50 INJECTION, SOLUTION INTRAMUSCULAR; INTRAVENOUS at 14:30

## 2022-12-27 RX ADMIN — PROPOFOL 100 MCG/KG/MIN: 10 INJECTION, EMULSION INTRAVENOUS at 11:54

## 2022-12-27 RX ADMIN — MIDAZOLAM 1 MG: 1 INJECTION INTRAMUSCULAR; INTRAVENOUS at 11:52

## 2022-12-27 RX ADMIN — SODIUM CHLORIDE: 9 INJECTION, SOLUTION INTRAVENOUS at 09:21

## 2022-12-27 RX ADMIN — FENTANYL CITRATE 50 MCG: 50 INJECTION, SOLUTION INTRAMUSCULAR; INTRAVENOUS at 14:45

## 2022-12-27 RX ADMIN — FENTANYL CITRATE 50 MCG: 50 INJECTION, SOLUTION INTRAMUSCULAR; INTRAVENOUS at 14:35

## 2022-12-27 RX ADMIN — MIDAZOLAM 1 MG: 1 INJECTION INTRAMUSCULAR; INTRAVENOUS at 11:57

## 2022-12-27 ASSESSMENT — PAIN DESCRIPTION - ORIENTATION
ORIENTATION: LEFT
ORIENTATION: LOWER

## 2022-12-27 ASSESSMENT — PAIN SCALES - GENERAL
PAINLEVEL_OUTOF10: 7
PAINLEVEL_OUTOF10: 8
PAINLEVEL_OUTOF10: 8
PAINLEVEL_OUTOF10: 7
PAINLEVEL_OUTOF10: 6
PAINLEVEL_OUTOF10: 7

## 2022-12-27 ASSESSMENT — PAIN DESCRIPTION - PAIN TYPE: TYPE: CHRONIC PAIN

## 2022-12-27 ASSESSMENT — PAIN DESCRIPTION - DESCRIPTORS
DESCRIPTORS: ACHING
DESCRIPTORS: ACHING;DISCOMFORT
DESCRIPTORS: THROBBING
DESCRIPTORS: THROBBING

## 2022-12-27 ASSESSMENT — PAIN DESCRIPTION - LOCATION
LOCATION: ARM
LOCATION: BACK

## 2022-12-27 ASSESSMENT — ENCOUNTER SYMPTOMS: SHORTNESS OF BREATH: 1

## 2022-12-27 NOTE — DISCHARGE INSTRUCTIONS
Discharge Instructions Following Vascular Surgery for  [facility] Cardiothoracic and Vascular Surgery  Pt Name: Laina Mclean Record Number: [id number]  Today's Date: 12/27/2022    ACTIVITY/EXERCISE   ? Slowly increase your activity after you go home. Pace yourself, listen to your body. If it hurts, stop. ? It will take 3 to 4 weeks to feel like you did before surgery in terms of energy and strength. ? No constricting items on arm that fistula procedure was performed. ? Elevate arm to heart height while at rest to prevent swelling. Use stress ball while at rest to increase blood flow. APPETITE   ? Your appetite might be decreased at first.  It should slowly improve. TEMPERATURE   ? Take your temperature at the same time each day. Take your temperature at 8 a.m. (morning) and 8 p.m. (evening). PAIN   ? You may have pain at the incision. Take your pain medications as ordered. BOWEL HABITS   ? Constipation is common due to Pain medications and inactivity. ? Try drinking prune juice, eating a well balanced diet including fruits, vegetables and whole grains. ? If constipation persists, you may use any over-the-counter laxative, suppository or enema. DRIVING AND RIDING IN A CAR INSTRUCTIONS  ? No DRIVING while on prescription pain medication! If driving prior to surgery, may resume driving as soon as prescription pain medication is discontinued. INCISIONS  ? Warning signs of infection: redness, warmth to touch, green colored pus, tender to touch. ? Clean your incisions twice daily with soap. Ok to shower the day of procedure but do not bathe until 4 days post procedure. ? Be sure to rinse the incision with water and pat dry with clean towels. ?          If you have Steri strips, leave them in place until they fall off.  ?         Staples and stitches are normally removed in 10-14 days post op. SMOKING   If you smoke, STOP.   Smoking will cause early graft closure, other blockages, new heart attacks, and possibly even death. SLEEPING   ? If you have trouble sleeping during the night, take your pain medication at bedtime. CALL YOUR SURGEON IF YOU HAVE:   ? Rapid heart rate or fluttering in your chest   ? Fever over 101° F.   ? Weight gain or loss of 3 pounds overnight or 5      pounds in one week   ? Persistent nausea or vomiting        ? ANY NEW INCISION DRAINAGE   ? Increasing shortness of breath   ? Pain unrelieved by prescribed medication    OFFICE VISIT   ? You should see your surgeon about 2 weeks after discharge from the hospital.   ?    Call your surgeon's office to make an appointment if you don't have a scheduled appointment ([phone]). ?   Bring any questions you have so they may be addressed. ? You will need a follow up appointment with you primary care doctor in 7-10 days from discharge, please call and make one if you do not have one.   ? BRING YOUR MEDICATION LIST and medications even over the counter medications to all your follow up apointments. ? Office [facility]   [address] [facility]              EMERGENCIES   ? You should either call 911 or go to the Emergency Room to be evaluated if you need seen immediately   ? Sudden, severe shortness of breath go to the Emergency Room    If you have questions regarding these instructions, please call our office  [phone]. We have an answering service 24 hours a day to get your calls to the ON Call Physician, but we are often in surgery and it takes us awhile to get back to you.

## 2022-12-27 NOTE — ANESTHESIA PRE PROCEDURE
Department of Anesthesiology  Preprocedure Note       Name:  Mary Lou Owens   Age:  63 y.o.  :  1960                                          MRN:  [id number]         Date:  2022      Surgeon: Kiera Soni):  Cristine Henning MD    Procedure: Procedure(s):  LEFT ARM ARM ARTERIO-VENOUS FISTULA CREATION    Medications prior to admission:   Prior to Admission medications    Medication Sig Start Date End Date Taking? Authorizing Provider   gabapentin (NEURONTIN) 300 MG capsule Take 1 capsule by mouth nightly for 30 days. 22  SAHIL Neves CNP   oxyCODONE-acetaminophen (PERCOCET)  MG per tablet Take 1 tablet by mouth every 8 hours as needed for Pain for up to 30 days. Intended supply: 30 days 22  SAHIL Neves CNP   rOPINIRole (REQUIP) 0.5 MG tablet take 1 tablet by mouth IN THE AFTERNOON AND 2 TABLETS AT NIGHT.  22   Merle Rodriguez PA-C   sevelamer (RENVELA) 800 MG tablet Take 1 tablet by mouth 3 times daily 10/22/22   Historical Provider, MD   doxepin (SINEQUAN) 25 MG capsule take 2 capsules by mouth at bedtime 10/10/22   Merle Rodriguez PA-C   hydrALAZINE (APRESOLINE) 50 MG tablet Take 1 tablet by mouth every 8 hours  Patient taking differently: Take 100 mg by mouth every 8 hours 22  Luisito Martini DO   carvedilol (COREG) 25 MG tablet Take 1 tablet by mouth 2 times daily (with meals)  Patient taking differently: Take 50 mg by mouth 2 times daily (with meals) two 25 mg tablets bid 22  Luisito Martini DO   tamsulosin (FLOMAX) 0.4 MG capsule Take 1 capsule by mouth daily 22   Jeanna Jo DO   naloxone 4 MG/0.1ML LIQD nasal spray 1 spray by Nasal route as needed for Opioid Reversal 22   SAHIL Neves CNP   potassium chloride (K-TAB) 10 MEQ extended release tablet Take 2 tablets by mouth in the morning. 22  Gregg Hagen MD   bumetanide (BUMEX) 1 MG tablet Take 1 tablet by mouth in the morning and 1 tablet before bedtime. 7/22/22 8/17/22  Ernie Cao MD   CPAP Machine MISC by Does not apply route Please change bipap to 18/14 cm H20. 5/31/22   Anthony Hong PA-C   amitriptyline (ELAVIL) 10 MG tablet Take 10 mg by mouth 2 times daily    Historical Provider, MD   levothyroxine (SYNTHROID) 175 MCG tablet Take 1 tablet by mouth Daily 3/31/22   Jose Daniel Armen Vargas, DO   melatonin 3 MG TABS tablet Take 1.5 tablets by mouth nightly as needed (Sleep) 3/30/22 12/19/22  Jose Daniel Armne Vargas, DO   amLODIPine (NORVASC) 10 MG tablet Take 1 tablet by mouth daily  Patient not taking: No sig reported 3/31/22 8/17/22  Jose Daniel Vargas, DO   ipratropium-albuterol (DUONEB) 0.5-2.5 (3) MG/3ML SOLN nebulizer solution Inhale 3 mLs into the lungs every 4 hours as needed for Shortness of Breath 3/21/22   Tomasz Sweeney DO   montelukast (SINGULAIR) 10 MG tablet Take 1 tablet by mouth nightly 2/15/22   Jesus ShailaSAHIL guzman CNP   albuterol sulfate  (90 Base) MCG/ACT inhaler Inhale 2 puffs into the lungs every 6 hours as needed for Wheezing or Shortness of Breath 2/15/22   SAHIL Negron CNP   aspirin EC 81 MG EC tablet Take 1 tablet by mouth daily 9/10/21   SAHIL Sutherland CNP   nitroGLYCERIN (NITROSTAT) 0.4 MG SL tablet Place 1 tablet under the tongue every 5 minutes as needed for Chest pain (up to 3 doses) 6/7/21   SAHIL Sutherland CNP   glimepiride (AMARYL) 4 MG tablet Take 4 mg by mouth daily  1/16/21 8/17/22  Historical Provider, MD   azelastine (ASTELIN) 137 MCG/SPRAY nasal spray 1 spray by Nasal route as needed.  Use in each nostril as directed    Historical Provider, MD       Current medications:    Current Facility-Administered Medications   Medication Dose Route Frequency Provider Last Rate Last Admin    sodium chloride flush 0.9 % injection 5-40 mL  5-40 mL IntraVENous 2 times per day Kamlesh Christian PA-C        sodium chloride flush 0.9 % injection 5-40 mL  5-40 mL IntraVENous PRN LIVAN Saunders 0.9 % sodium chloride infusion   IntraVENous PRN Enio Bush PA-C        ceFAZolin (ANCEF) 3000 mg in dextrose 5 % 100 mL IVPB  3,000 mg [facility]           Allergies:     Allergies   Allergen Reactions    Shellfish-Derived Products Swelling     Tolerates IV dye without any problems      Pcn [Penicillins] Itching    Succinylcholine      Pseudocholinesterase Deficiency    Sulfa Antibiotics Itching    Morphine Nausea And Vomiting     \"head swimming, skin crawling\"    Tape [Adhesive Tape] Rash       Problem List:    Patient Active Problem List   Diagnosis Code    Allergy to bee sting Z91.030    Obesity, Class III, BMI 40-49.9 (morbid obesity) ([facility]) E66.01    Weight gain R63.5    Primary hypertension I10    Rheumatoid arteritis ([facility]) M05.20    Hypothyroidism E03.9    Gastroenteritis K52.9    Obstructive sleep apnea G47.33    Small intestinal bacterial overgrowth K63.89    Acute diarrhea R19.7    Generalized abdominal pain R10.84    Nausea and vomiting R11.2    Hepatomegaly R16.0    Ileus ([facility]) K56.7    Hypokalemia E87.6    S/P partial resection of colon Z90.49    S/P laparotomy Z98.890    Acute renal failure superimposed on stage 5 chronic kidney disease, not on chronic dialysis ([facility]) N17.9, N18.5    Hypernatremia E87.0    Serum potassium decreased E87.6    Other headache syndrome G44.89    RLS (restless legs syndrome) G25.81    Psychophysiological insomnia F51.04    Angina, class III ([facility]) I20.9    Acute respiratory failure with hypoxia ([facility]) J96.01    Dyspnea R06.00    CHF (NYHA class III, ACC/AHA stage C) ([facility]) I50.9    Nonhealing surgical wound T81.89XA    Diabetes mellitus ([facility]) E11.9    CAROLE (acute kidney injury) ([facility] [address] N17.9    Acute hypoxemic respiratory failure ([facility]) J96.01    Acute on chronic congestive heart failure (HCC) I50.9    Stage 3a chronic kidney disease (HCC) N18.31    Anemia of chronic renal failure N18.9, D63.1    CHF (congestive heart failure), NYHA class III, acute on chronic, combined (HCC) I50.43    CHF (congestive heart failure), NYHA class II, chronic, diastolic (HCC) W56.52    Chronic kidney disease, stage IV (severe) (HCC) N18.4    Hyperkalemia E87.5    ATN (acute tubular necrosis) (HCC) N17.0       Past Medical History:        Diagnosis Date    Arthritis     Back problem     back pain-sees [facility] pain mgmt    Bladder disease     Dr. Lamonte aPula CHF with unknown LVEF ([facility] [address] 05/24/2021    Dr. George/CHF clinic    Chronic kidney disease     Constipation     Diabetes mellitus ([facility] [address]     Dr. Ora Franks Hypertension     Hypertensive emergency 04/11/2019    Hypertensive urgency 04/13/2019    Sleep apnea     has bipap-sees AZRA Craven CNP    Thyroid disease        Past Surgical History:        Procedure Laterality Date    ABDOMEN SURGERY      ABDOMEN SURGERY N/A 3/25/2022    ABDOMINAL LYSIS OF ADHESIONS, EXPLANT OF INFECTED MESH performed by Sandra Tamayo MD at [place] [address]      no stents    CARPAL TUNNEL RELEASE Bilateral     COLONOSCOPY  2017    Dr. Arley Ibarra, [address]      x3 surgeries with 14 repairs    KNEE SURGERY Right 1980's    cartilage    VT EXPLORATORY OF ABDOMEN N/A 2/5/2018    ABDOMINAL EXPLORATION WITH LYSIS OF COMPLICATED ADHESIONS WITH AN EXTENDED RIGHT COLON RESECTION performed by [facility] at [facility] History:    Social History     Tobacco Use    Smoking status: Never    Smokeless tobacco: Current     Types: Chew    Tobacco comments:     quit pipe over 30 yrs ago   Substance Use Topics    Alcohol use: No     Alcohol/week: 0.0 standard drinks     Comment: quit                                Ready to quit: Not Answered  Counseling given: Not Answered  Tobacco comments: quit pipe over 30 yrs ago      Vital Signs (Current):   Vitals:    12/27/22 0846   BP: (!) 166/88   Pulse: 63   Resp: 16   Temp: 97.8 °F (36.6 °C)   TempSrc: Temporal   SpO2: 94%   Weight: (!) 303 lb 12.8 oz (137.8 kg)   Height: 6' 1\" (1.854 m)                                              BP Readings from Last 3 Encounters:   12/27/22 (!) 166/88   12/19/22 135/75   12/12/22 124/68       NPO Status:                                                                                 BMI:   Wt Readings from Last 3 Encounters:   12/27/22 (!) 303 lb 12.8 oz (137.8 kg)   12/19/22 (!) 301 lb 12.8 oz (136.9 kg)   12/12/22 (!) 310 lb (140.6 kg)     Body mass index is 40.08 kg/m². CBC:   Lab Results   Component Value Date/Time    WBC 4.6 08/17/2022 05:32 AM    RBC 2.87 08/17/2022 05:32 AM    RBC 5.15 08/12/2011 08:29 PM    HGB 7.5 08/17/2022 05:32 AM    HCT 24.6 08/17/2022 05:32 AM    MCV 85.7 08/17/2022 05:32 AM    RDW 17.8 08/01/2022 10:13 AM     08/17/2022 05:32 AM       CMP:   Lab Results   Component Value Date/Time     12/19/2022 01:01 PM    K 4.0 12/19/2022 01:01 PM    CL 96 12/19/2022 01:01 PM    CO2 27 12/19/2022 01:01 PM    BUN 27 12/19/2022 01:01 PM    CREATININE 6.0 12/19/2022 01:01 PM    LABGLOM 10 12/19/2022 01:01 PM    GLUCOSE 89 12/19/2022 01:01 PM    PROT 8.4 08/08/2022 07:09 PM    CALCIUM 9.4 12/19/2022 01:01 PM    BILITOT 0.3 08/08/2022 07:09 PM    ALKPHOS 91 08/08/2022 07:09 PM    AST 9 08/08/2022 07:09 PM    ALT 9 08/08/2022 07:09 PM       POC Tests: No results for input(s): POCGLU, POCNA, POCK, POCCL, POCBUN, POCHEMO, POCHCT in the last 72 hours.     Coags:   Lab Results   Component Value Date/Time    INR 1.14 06/16/2021 08:17 AM    APTT 33.5 06/16/2021 08:17 AM       HCG (If Applicable): No results found for: PREGTESTUR, PREGSERUM, HCG, HCGQUANT     ABGs: No results found for: PHART, PO2ART, MYB8DPL, NHV4OSC, BEART, X1DEXTLC     Type & Screen (If Applicable):  Lab Results   Component Value Date    LABRH POS 08/12/2022       Drug/Infectious Status (If Applicable):  Lab Results   Component Value Date/Time    HEPCAB Negative 12/01/2017 09:07 AM       COVID-19 Screening (If Applicable):   Lab Results   Component Value Date/Time    COVID19 NOT  DETECTED 04/20/2022 02:25 PM    COVID19 Not Detected 03/19/2022 04:42 PM           Anesthesia Evaluation    Airway: Mallampati: II  TM distance: >3 FB   Neck ROM: full  Mouth opening: > = 3 FB   Dental:          Pulmonary:   (+) shortness of breath:  sleep apnea:  decreased breath sounds                            Cardiovascular:    (+) hypertension:, CHF:,         Rhythm: regular                      Neuro/Psych:   (+) psychiatric history:            GI/Hepatic/Renal:   (+) liver disease:, renal disease: ESRD, morbid obesity          Endo/Other:    (+) Diabetes, hypothyroidism: arthritis:., .                 Abdominal:   (+) obese,           Vascular: Other Findings:           Anesthesia Plan      MAC     ASA 4     (Pseudo cholinesterase difficiency)  Induction: intravenous. Anesthetic plan and risks discussed with patient and spouse. Plan discussed with CRNA.                     Shannon Poe MD   12/27/2022

## 2022-12-27 NOTE — PROGRESS NOTES
Patient admitted to [facility] room 2 with wife at bedside. Bed in low position side rails up call light in reach. Patient denies questions at this time.

## 2022-12-27 NOTE — PROGRESS NOTES
Pt returned to [facility] room 2. Vitals and assessment as charted. 0.9 infusing, @200ml to count from PACU. Pt has muffin and claude mist. Family at the bedside. Pt and family verbalized understanding of discharge criteria and call light use. Call light in reach.

## 2022-12-27 NOTE — PROGRESS NOTES
1407 - pt arrives to pacu, respirations easy and unlabored on room air, pt denies pain at this time, bruit heard on left arm fistula, VSS    1420 - pt denies pain at this time    1430 - pt given 50 mcg of fentanyl     1435 - pt given 50 mcg of fentanyl     1445 - pt given 50 mcg of fentanyl    1450 - pt resting on and off, easily awakens to verbal stimuli, pt states pain under control at this time    1500 - pt states pain tolerable at this time, 6 out of 10    1505 - pt meets criteria for discharge from pacu at this time, pt transported to [facility] in stable condition

## 2022-12-27 NOTE — PROGRESS NOTES
Pt has met discharge criteria and states he is ready for discharge to home. IV removed, gauze and tape applied. Dressed in own clothes and personal belongings gathered. Discharge instructions (with opioid medication education information) given to pt and family; pt and family verbalized understanding of discharge instructions, prescriptions and follow up appointments. Pt transported to discharge lobby by [place] staff.

## 2022-12-27 NOTE — OP NOTE
Operative Note      Patient: Lencho Wells  YOB: 1960  MRN: [id number]    Date of Procedure: 12/27/2022    Pre-Op Diagnosis:     Stage 4 chronic kidney disease ([facility] [address] [N18.4]  Morbid obesity  HTN  RA  CHF  LAURIE  Type 2 DM    Post-Op Diagnosis: Same       Procedure:  Left upper extremity Brachio-Cephalic AVF creation    Surgeon(s):  Ish Mcmullen MD    Assistant:   Physician Assistant: Rafaela Wood PA-C    Anesthesia: Monitor Anesthesia Care    Estimated Blood Loss (mL): less than 50     Complications: None    Specimens:   * No specimens in log *    Implants:  * No implants in log *      Drains: * No LDAs found *    Findings:     Heparin 7500 units  4 mm Cephalic Vein  4.5 mm Brachial artery  Excellent thrill noted at end of procedure    Detailed Description of Procedure: The patient was seen in the holding area, and the informed consent was obtained and the patient's correct extremity was marked. The patient was then taken to the operating room and placed supine on the operating table. General anesthesia was commenced. The patient's left upper extremity was prepped and draped in the usual sterile fashion circumferentially. Previous ultrasound revealed excellent left upper arm cephalic vein for use as a conduit for AVF creation. Time out was called and all preoperative workup and lab results and antibiotics were confirmed. Thus, a horizontal incision was made just above the antecubital crease. Incision was taken down through the subcutaneous tissue with the cautery. On the lateral aspect of the incision, the cephalic vein was seen and carefully skeletonized. Side branches were ligated with 2-0 and 3-0 silk ties and the other end was clipped. The very proximal end of the cephalic vein was then clamped with a right angle clamp and divided, and a vessel cannula placed and tied into place.   The cephalic vein was then gently irrigated with the vein cannula with heparinized saline, and the vein inflated well without leaks. Bulldog clamp was placed on the distal vein. The anterior surface of the vein was marked and then attention was then turned to the brachial artery. The brachial artery was found medially in the incision in the bicipital groove. The artery was quite deep given the patient's obesity. The artery was of good size and skeletonized and vessel loops placed proximally and distally. Once done, the patient was systemically heparinized with 5000 units of Heparin. Next, the arteriotomy was made with a #11 blade and was lengthened with a Galdamez scissor. The vein was cut to size and spatulated. In order for the cephalic vein to reach and be in good alignment with the brachial artery, removal of the subcutaneous fat in the antecububital area was required with cautery. The cephalic vein to brachial artery anastomosis was conducted with 6-0 Prolene, BV-1 suture, end to side with a parachute technique. Prior to tying the last suture, the artery was de-aired fully and the final stitch tied down. The bulldog clamp on the vein was released, and then the proximal arterial vessel loop was loosened, and then after several heartbeats, the distal arterial vessel loop was released. Excellent thrill was noted on the AVF and in the upper arm cephalic vein outflow. There was excellent multiphasic signal noted in the brachial artery, and the radial/ulnar/palmar arch areas. The left hand was warm and perfused. Thus, the incision was irrigated with normal saline. No repair sutures required. Good hemostasis noted. The incision was closed in multiple layers with  3-0 Vicryl and 4-0 Monocryl for the subcuticular layer. Skin glue was placed, and DSD applied. The patient was then extubated and brought to the PACU in stable condition. PLAN:  To PACU, then discharge to home. Resume all home medications and dialysis schedule.   I explained to the patient and his wife that due to the patient's obesity, he may require a 2nd stage superficialization of the AVF in the next few weeks in order to cannulate the AVF for HD easier.   Electronically signed by Luanne Du MD on 12/27/2022 at 1:59 PM

## 2022-12-27 NOTE — H&P
CT/CV Surgery H&P    2022 9:23 AM  Surgeon:  Dr. Phyllis Roa    HPI:    Mr. Nba Urias is scheduled for left upper extremity arteriovenous fistula creation  today. In review, he is a 63year old male with PMHx of ESRD on HD, HTN, RA, CHF, LAURIE, Obesity, and DM2 who presents for evaluation of new AVF creation. The patient was a long term CKD patient. The CKD progressed through stage 4 this past summer, and he began HD via a right IJ permacath on 8/10/2022. The permacath needed exchange for occlusion on 10/14/22. His schedule is , and he is right hand dominant. The HD is not progressing smoothly, as his permacath does occlude frequently. He was considered for a Wave-link percutaneous access per EMR, but patient is unaware of this. No previous AVF/AVG. Vein mapping performed:     Left Cephalic 2.7 at wrist, but >3.5 mm above elbow  Left Basilic >6.3 mm above elbow. Brachial artery >5.0 mm       Vital Signs: BP (!) 166/88   Pulse 63   Temp 97.8 °F (36.6 °C) (Temporal)   Resp 16   Ht 6' 1\" (1.854 m)   Wt (!) 303 lb 12.8 oz (137.8 kg)   SpO2 94%   BMI 40.08 kg/m²    Temp (24hrs), Av.8 °F (36.6 °C), Min:97.8 °F (36.6 °C), Max:97.8 °F (36.6 °C)    Labs:   CBC: No results for input(s): WBC, HGB, HCT, MCV, PLT, APTT, PROTIME, INR in the last 72 hours. BMP: No results for input(s): NA, K, CL, CO2, PHOS, BUN, CREATININE, CA, MG, POCGLU in the last 72 hours. Last HgA1C:   Lab Results   Component Value Date    LABA1C 4.7 2022     Imaging:  Results for orders placed during the hospital encounter of 22    XR CHEST (2 VW)    Narrative  PROCEDURE: XR CHEST (2 VW)    CLINICAL INFORMATION: CHF (congestive heart failure), NYHA class II, chronic, diastolic ([address] COMPARISON: Radiograph 2021. TECHNIQUE: 2 PA and one lateral view of the chest were obtained. FINDINGS:  There are some opacities at the right lung base. There is cardiomegaly.  The pulmonary vasculature is within normal limits. There is no significant pleural effusion or pneumothorax. Visualized portions of the upper abdomen are within normal limits. The osseous structures are intact. No acute fractures or suspicious osseous lesions. Impression  Patchy opacities at the right lung base which could be due to infiltrates or atelectasis. **This report has been created using voice recognition software. It may contain minor errors which are inherent in voice recognition technology. **    Final report electronically signed by Dr Anjali Coleman on 3/3/2022 4:33 PM    Home Meds:  Prior to Admission medications    Medication Sig Start Date End Date Taking? Authorizing Provider   gabapentin (NEURONTIN) 300 MG capsule Take 1 capsule by mouth nightly for 30 days. 12/5/22 1/4/23  SAHIL Naidu CNP   oxyCODONE-acetaminophen (PERCOCET)  MG per tablet Take 1 tablet by mouth every 8 hours as needed for Pain for up to 30 days. Intended supply: 30 days 12/5/22 1/4/23  SAHIL Naidu CNP   rOPINIRole (REQUIP) 0.5 MG tablet take 1 tablet by mouth IN THE AFTERNOON AND 2 TABLETS AT NIGHT.  11/1/22   Aimee Gonzalez PA-C   sevelamer (RENVELA) 800 MG tablet Take 1 tablet by mouth 3 times daily 10/22/22   Historical Provider, MD   doxepin (SINEQUAN) 25 MG capsule take 2 capsules by mouth at bedtime 10/10/22   Aimee Gonzalez PA-C   hydrALAZINE (APRESOLINE) 50 MG tablet Take 1 tablet by mouth every 8 hours  Patient not taking: Reported on 12/27/2022 8/17/22 12/5/22  Luisito Martini DO   carvedilol (COREG) 25 MG tablet Take 1 tablet by mouth 2 times daily (with meals)  Patient taking differently: Take 50 mg by mouth 2 times daily (with meals) two 25 mg tablets bid 8/17/22 12/27/22  Luisito Martini DO   tamsulosin (FLOMAX) 0.4 MG capsule Take 1 capsule by mouth daily 8/17/22   Chetna Donaldson,    naloxone 4 MG/0.1ML LIQD nasal spray 1 spray by Nasal route as needed for Opioid Reversal  Patient not Back problem, Bladder disease, CHF with unknown LVEF ([facility] Chronic kidney disease, Constipation, Diabetes mellitus ([address] Hypertension, Hypertensive emergency, Hypertensive urgency, Sleep apnea, and Thyroid disease. Past Surgical History:  The patient  has a past surgical history that includes Appendectomy; knee surgery (Right, 1980's); Carpal tunnel release (Bilateral); Colonoscopy (2017); hernia repair; pr exploratory of abdomen (N/A, 2/5/2018); Dilatation, esophagus; Abdomen surgery; Cardiac catheterization; and Abdomen surgery (N/A, 3/25/2022). Allergies: The patient is allergic to shellfish-derived products, pcn [penicillins], succinylcholine, sulfa antibiotics, morphine, and tape [adhesive tape]. Family History: This patient's family history includes Arthritis in his brother; Cancer in his father and mother; Diabetes in his paternal grandmother; Heart Attack in his brother; Heart Disease in his father; Prostate Cancer in his brother; Stroke in his brother. Social History:  Carmenza Brumfield  reports that he has never smoked. His smokeless tobacco use includes chew. He reports that he does not drink alcohol and does not use drugs. ROS:  Constitutional:  Negative for activity change and appetite change. Respiratory:  Positive for shortness of breath and wheezing. Cardiovascular:  Positive for leg swelling. Gastrointestinal:  Positive for diarrhea and nausea. Musculoskeletal:  Positive for arthralgias, back pain and joint swelling. Neurological: Negative. Hematological: Negative. Psychiatric/Behavioral: Negative. Physical Exam:      Constitutional:       Appearance: Normal appearance. He is obese. HENT:      Head: Normocephalic and atraumatic. Cardiovascular:      Rate and Rhythm: Normal rate and regular rhythm. Pulses: Normal pulses. Pulmonary:      Effort: Pulmonary effort is normal.      Breath sounds: Wheezing present. Abdominal:      General: Abdomen is flat. Palpations: Abdomen is soft. Comments: +midline scars, well-healed. Musculoskeletal:      Right lower leg: Edema present. Left lower leg: Edema present. Skin:     General: Skin is warm and dry. Neurological:      General: No focal deficit present. Mental Status: He is alert and oriented to person, place, and time. Psychiatric:         Mood and Affect: Mood normal.         Behavior: Behavior normal.           Assessment:    CKD IV; needs new permanent access.   Patient Active Problem List   Diagnosis    Allergy to bee sting    Obesity, Class III, BMI 40-49.9 (morbid obesity) (HCC)    Weight gain    Primary hypertension    Rheumatoid arteritis (HCC)    Hypothyroidism    Gastroenteritis    Obstructive sleep apnea    Small intestinal bacterial overgrowth    Acute diarrhea    Generalized abdominal pain    Nausea and vomiting    Hepatomegaly    Ileus (HCC)    Hypokalemia    S/P partial resection of colon    S/P laparotomy    Acute renal failure superimposed on stage 5 chronic kidney disease, not on chronic dialysis (HCC)    Hypernatremia    Serum potassium decreased    Other headache syndrome    RLS (restless legs syndrome)    Psychophysiological insomnia    Angina, class III (HCC)    Acute respiratory failure with hypoxia (HCC)    Dyspnea    CHF (NYHA class III, ACC/AHA stage C) (HCC)    Nonhealing surgical wound    Diabetes mellitus ([address]    CAROLE (acute kidney injury) ([address]    Acute hypoxemic respiratory failure (HCC)    Acute on chronic congestive heart failure (HCC)    Stage 3a chronic kidney disease (HCC)    Anemia of chronic renal failure    CHF (congestive heart failure), NYHA class III, acute on chronic, combined (HCC)    CHF (congestive heart failure), NYHA class II, chronic, diastolic (HCC)    Chronic kidney disease, stage IV (severe) (HCC)    Hyperkalemia    ATN (acute tubular necrosis) ([address]       Plan: 12/27/22  Schedule left upper extremity AVF creation, most likely will be left brachio-cephalic AVF. I had a discussion in the office with the patient about his diagnosis, the expected procedure involved, and all risks/benefits/alternatives and the possible complications. After this discussion, the patient understood all risks and wished to proceed.          Electronically signed by Dimas Raymundo MD on 12/27/2022 at 9:23 AM

## 2022-12-27 NOTE — DISCHARGE SUMMARY
CT/CV Surgery Discharge Summary     Pt Name: Oscar Katz  MRN: [id number]  YOB: 1960  Primary Care Physician: Burrell Severe, DO    Admit date:  12/27/2022  8:35 AM     Discharge date:  12/27/22     Disposition: Home    Admitting Diagnosis: CKD IV; needs new permanent access    Discharge Diagnosis: CKD IV; needs new permanent access    Condition: Stable    Problem List:   Patient Active Problem List   Diagnosis Code    Allergy to bee sting Z91.030    Obesity, Class III, BMI 40-49.9 (morbid obesity) ([facility] E66.01    Weight gain R63.5    Primary hypertension I10    Rheumatoid arteritis (HCC) M05.20    Hypothyroidism E03.9    Gastroenteritis K52.9    Obstructive sleep apnea G47.33    Small intestinal bacterial overgrowth K63.89    Acute diarrhea R19.7    Generalized abdominal pain R10.84    Nausea and vomiting R11.2    Hepatomegaly R16.0    Ileus (HCC) K56.7    Hypokalemia E87.6    S/P partial resection of colon Z90.49    S/P laparotomy Z98.890    Acute renal failure superimposed on stage 5 chronic kidney disease, not on chronic dialysis ([facility] N17.9, N18.5    Hypernatremia E87.0    Serum potassium decreased E87.6    Other headache syndrome G44.89    RLS (restless legs syndrome) G25.81    Psychophysiological insomnia F51.04    Angina, class III (HCC) I20.9    Acute respiratory failure with hypoxia ([facility]) J96.01    Dyspnea R06.00    CHF (NYHA class III, ACC/AHA stage C) (HCC) I50.9    Nonhealing surgical wound T81.89XA    Diabetes mellitus ([facility] E11.9    CAROLE (acute kidney injury) ([facility] N17.9    Acute hypoxemic respiratory failure (HCC) J96.01    Acute on chronic congestive heart failure (HCC) I50.9    Stage 3a chronic kidney disease (HCC) N18.31    Anemia of chronic renal failure N18.9, D63.1    CHF (congestive heart failure), NYHA class III, acute on chronic, combined (HCC) I50.43    CHF (congestive heart failure), NYHA class II, chronic, diastolic (HCC) J60.24    Chronic kidney disease, stage IV (severe) ([facility] N18.4    Hyperkalemia E87.5    ATN (acute tubular necrosis) ([facility]) N17.0    Stage 4 chronic kidney disease ([facility] N18.4       Procedures: 12/27/22    Left upper extremity Brachio-Cephalic AVF creation  Reason for Admission:    \"63year old male with PMHx of ESRD on HD, HTN, RA, CHF, LAURIE, Obesity, and DM2 who presents for evaluation of new AVF creation. The patient was a long term CKD patient. The CKD progressed through stage 4 this past summer, and he began HD via a right IJ permacath on 8/10/2022. The permacath needed exchange for occlusion on 10/14/22. His schedule is Tu-Th-Sa, and he is right hand dominant. The HD is not progressing smoothly, as his permacath does occlude frequently,\" per Dr. Axel Negron. Hospital Course: Following an uncomplicated Left upper extremity Brachio-Cephalic AVF creation,  the patient had an unremarkable and progressive convalescence without adverse events. At time of discharge, Jasiel Cardoso was alert, tolerating a regular diet, ambulating on his own accord and had adequate analgesia on oral pain medications, and had no signs of any complications. DISCHARGE INSTRUCTIONS:    Discharge Vitals:  height is 6' 1\" (1.854 m) and weight is 303 lb 12.8 oz (137.8 kg) (abnormal). His temporal temperature is 97.7 °F (36.5 °C). His blood pressure is 119/67 and his pulse is 68. His respiration is 10 and oxygen saturation is 93%.      Discharge Medications:         Medication List        CONTINUE taking these medications      albuterol sulfate  (90 Base) MCG/ACT inhaler  Commonly known as: PROVENTIL;VENTOLIN;PROAIR  Inhale 2 puffs into the lungs every 6 hours as needed for Wheezing or Shortness of Breath     amitriptyline 10 MG tablet  Commonly known as: ELAVIL     aspirin EC 81 MG EC tablet     azelastine 0.1 % nasal spray  Commonly known as: ASTELIN     CPAP Machine Misc  by Does not apply route Please change bipap to 18/14 cm H20.     doxepin 25 MG capsule  Commonly known as: SINEQUAN  take 2 capsules by mouth at bedtime     gabapentin 300 MG capsule  Commonly known as: NEURONTIN  Take 1 capsule by mouth nightly for 30 days. ipratropium-albuterol 0.5-2.5 (3) MG/3ML Soln nebulizer solution  Commonly known as: DUONEB  Inhale 3 mLs into the lungs every 4 hours as needed for Shortness of Breath     levothyroxine 175 MCG tablet  Commonly known as: SYNTHROID  Take 1 tablet by mouth Daily     melatonin 3 MG Tabs tablet  Take 1.5 tablets by mouth nightly as needed (Sleep)     montelukast 10 MG tablet  Commonly known as: SINGULAIR  Take 1 tablet by mouth nightly     nitroGLYCERIN 0.4 MG SL tablet  Commonly known as: Nitrostat  Place 1 tablet under the tongue every 5 minutes as needed for Chest pain (up to 3 doses)     oxyCODONE-acetaminophen  MG per tablet  Commonly known as: Percocet  Take 1 tablet by mouth every 8 hours as needed for Pain for up to 30 days. Intended supply: 30 days     rOPINIRole 0.5 MG tablet  Commonly known as: REQUIP  take 1 tablet by mouth IN THE AFTERNOON AND 2 TABLETS AT NIGHT. sevelamer 800 MG tablet  Commonly known as: RENVELA     tamsulosin 0.4 MG capsule  Commonly known as: FLOMAX  Take 1 capsule by mouth daily            STOP taking these medications      amLODIPine 10 MG tablet  Commonly known as: NORVASC     bumetanide 1 MG tablet  Commonly known as: BUMEX     glimepiride 4 MG tablet  Commonly known as: AMARYL     hydrALAZINE 50 MG tablet  Commonly known as: APRESOLINE     naloxone 4 MG/0.1ML Liqd nasal spray     potassium chloride 10 MEQ extended release tablet  Commonly known as: K-Tab            ASK your doctor about these medications      carvedilol 25 MG tablet  Commonly known as: Coreg  Take 1 tablet by mouth 2 times daily (with meals)              EMERGENCIES   ? You should either call 911 or go to the Emergency Room to be evaluated if you need seen immediately   ?          Sudden, severe shortness of breath go to the Emergency Room    If you have questions regarding these instructions, please call our office  [phone]. We have an answering service 24 hours a day to get your calls to the ON Call Physician, but we are often in surgery and it takes us awhile to get back to you.    ? BRING YOUR MEDICATION LIST and medications even over the counter medications to all your follow up apointments. ? Office Location:   [facility], [address] [facility], [address] [place] [address], Demetrius GONZALEZ Broder[address]     Follow-up:    1. Follow up with Follow up with: Dr. Zeke Bullard  in 2-3 weeks. 3. The pt was agreeable to discharge and future plan of care. All questions were thoroughly answered.        Alia John PA-C   Electronincally signed 12/27/2022 at 2:18 PM    CC: Zach Lo,

## 2022-12-27 NOTE — ANESTHESIA POSTPROCEDURE EVALUATION
Department of Anesthesiology  Postprocedure Note    Patient: Nerissa Ramires  MRN: [id number]  YOB: 1960  Date of evaluation: 12/27/2022      Procedure Summary     Date: 12/27/22 Room / Location: [facility] Floyd    Anesthesia Start: 1149 Anesthesia Stop: 1964    Procedure: LEFT ARM ARM ARTERIO-VENOUS FISTULA CREATION (Left) Diagnosis:       Stage 4 chronic kidney disease (HCC)      (Stage 4 chronic kidney disease ([facility] [N18.4])    Surgeons: Kris Guardado MD Responsible Provider: Gerald Sanchez MD    Anesthesia Type: MAC ASA Status: 4          Anesthesia Type: MAC    Meche Phase I: Meche Score: 9    Meche Phase II:        Anesthesia Post Evaluation    Patient location during evaluation: bedside  Patient participation: complete - patient participated  Level of consciousness: awake and alert  Pain score: 0  Airway patency: patent  Nausea & Vomiting: no nausea and no vomiting  Complications: no  Cardiovascular status: hemodynamically stable  Respiratory status: acceptable  Hydration status: stable

## 2023-01-03 ENCOUNTER — HOSPITAL ENCOUNTER (OUTPATIENT)
Dept: ULTRASOUND IMAGING | Age: 63
Discharge: HOME | End: 2023-01-03
Payer: MEDICARE

## 2023-01-03 ENCOUNTER — HOSPITAL ENCOUNTER (OUTPATIENT)
Dept: INTERVENTIONAL RADIOLOGY/VASCULAR | Age: 63
Discharge: HOME | End: 2023-01-03
Payer: MEDICARE

## 2023-01-03 VITALS
BODY MASS INDEX: 40.91 KG/M2 | OXYGEN SATURATION: 94 % | DIASTOLIC BLOOD PRESSURE: 78 MMHG | SYSTOLIC BLOOD PRESSURE: 140 MMHG | WEIGHT: 308.64 LBS | TEMPERATURE: 97.4 F | HEART RATE: 67 BPM | HEIGHT: 73 IN | RESPIRATION RATE: 20 BRPM

## 2023-01-03 DIAGNOSIS — R31.9 HEMATURIA SYNDROME: ICD-10-CM

## 2023-01-03 DIAGNOSIS — N18.6 ESRD (END STAGE RENAL DISEASE) (HCC): ICD-10-CM

## 2023-01-03 PROCEDURE — 96368 THER/DIAG CONCURRENT INF: CPT

## 2023-01-03 PROCEDURE — 36598 INJ W/FLUOR EVAL CV DEVICE: CPT

## 2023-01-03 PROCEDURE — 2709999900 IR CONTRAST INJECTION  EXISTING CV ACCESS DEVICE EVALUATION W FLUORO

## 2023-01-03 PROCEDURE — 2500000003 HC RX 250 WO HCPCS: Performed by: RADIOLOGY

## 2023-01-03 PROCEDURE — 6360000004 HC RX CONTRAST MEDICATION: Performed by: RADIOLOGY

## 2023-01-03 PROCEDURE — 2580000003 HC RX 258: Performed by: RADIOLOGY

## 2023-01-03 PROCEDURE — 6360000002 HC RX W HCPCS: Performed by: RADIOLOGY

## 2023-01-03 PROCEDURE — 76770 US EXAM ABDO BACK WALL COMP: CPT

## 2023-01-03 PROCEDURE — 96366 THER/PROPH/DIAG IV INF ADDON: CPT

## 2023-01-03 PROCEDURE — 96365 THER/PROPH/DIAG IV INF INIT: CPT

## 2023-01-03 RX ORDER — SODIUM CHLORIDE 450 MG/100ML
INJECTION, SOLUTION INTRAVENOUS CONTINUOUS
Status: DISCONTINUED | OUTPATIENT
Start: 2023-01-03 | End: 2023-01-04 | Stop reason: HOSPADM

## 2023-01-03 RX ORDER — CLINDAMYCIN PHOSPHATE 600 MG/50ML
600 INJECTION INTRAVENOUS
Status: COMPLETED | OUTPATIENT
Start: 2023-01-03 | End: 2023-01-03

## 2023-01-03 RX ADMIN — ALTEPLASE: 2.2 INJECTION, POWDER, LYOPHILIZED, FOR SOLUTION INTRAVENOUS at 09:14

## 2023-01-03 RX ADMIN — SODIUM CHLORIDE: 4.5 INJECTION, SOLUTION INTRAVENOUS at 08:01

## 2023-01-03 RX ADMIN — IOPAMIDOL 5 ML: 612 INJECTION, SOLUTION INTRAVENOUS at 08:33

## 2023-01-03 RX ADMIN — CLINDAMYCIN PHOSPHATE 600 MG: 600 INJECTION, SOLUTION INTRAVENOUS at 08:02

## 2023-01-03 ASSESSMENT — PAIN DESCRIPTION - LOCATION: LOCATION: BACK

## 2023-01-03 ASSESSMENT — PAIN DESCRIPTION - ORIENTATION: ORIENTATION: LOWER

## 2023-01-03 ASSESSMENT — PAIN DESCRIPTION - PAIN TYPE: TYPE: CHRONIC PAIN

## 2023-01-03 ASSESSMENT — PAIN DESCRIPTION - DESCRIPTORS: DESCRIPTORS: ACHING

## 2023-01-03 ASSESSMENT — PAIN SCALES - GENERAL: PAINLEVEL_OUTOF10: 8

## 2023-01-03 NOTE — OP NOTE
Department of Radiology  Post Procedure Progress Note      Pre-Procedure Diagnosis:  Malfunctioning dialysis  Catheter     Procedure Performed:  cath check     Anesthesia: local    Findings: fibrin sheaths     Immediate Complications:  None    Estimated Blood Loss: minimal    SEE DICTATED PROCEDURE NOTE FOR COMPLETE DETAILS.     Homer Diaz MD   1/3/2023 8:29 AM

## 2023-01-03 NOTE — PROGRESS NOTES
0815 Pt arrived in IR for tunneled dialysis catheter exchange. 7257 Procedure reviewed with pt and pt agreed to site of procedure and procedure to be performed today. 3930 Pt positioned on table, monitor applied  0833 Pt transported to [facility]  via cart. Skin natural for race, warm, dry.  Respirations even and unlabored at rest. No complaints voiced at this time

## 2023-01-03 NOTE — PROGRESS NOTES
7374: Patient arrived ambulatory with cane for dialysis catheter exchange. Patient's wife at bedside. Patient rights and responsibilities offered to patient. Dialysis catheter noted in right chest. Dressing clean, dry, intact. Admission complete. IV hydration infusing. IV Cleocin infusing. Patient's belongings in bag and placed in bed with patient. Patient to go to dialysis post catheter exchange. 0815: Patient transported to \[facility]\"" via cart. All belongings sent with patient. Wife walking at bedside.                                    _m___ Safety:       (Environmental)  [place] to environment  Ensure ID band is correct and in place/ allergy band as needed  Assess for fall risk  Initiate fall precautions as applicable (fall band, side rails, etc.)  Call light within reach  Bed in low position/ wheels locked    _m___ Pain:       Assess pain level and characteristics  Administer analgesics as ordered  Assess effectiveness of pain management and report to MD as needed    _m___ Knowledge Deficit:  Assess baseline knowledge  Provide teaching at level of understanding  Provide teaching via preferred learning method  Evaluate teaching effectiveness    _m___ Hemodynamic/Respiratory Status:       (Pre and Post Procedure Monitoring)  Assess/Monitor vital signs and LOC  Assess Baseline SpO2 prior to any sedation  Obtain weight/height  Assess vital signs/ LOC until patient meets discharge criteria  Monitor procedure site and notify MD of any issues    _m___ Infection-Risk of Central Venous Catheter:  Monitor for infection signs and symptoms (catheter site redness, temperature elevation, etc)  Assess for infection risks  Educate regarding infection prevention  Manage central venous catheter (flushes/ dressing changes per protocol)

## 2023-01-03 NOTE — PROGRESS NOTES
1230: TPA infusion complete. Both lumens rin back 10 ml of blood. Flushed with saline and normal saline cap placed. Specials made aware and ok for patient to be discharged. Spoke with Naresh Dudley at dialysis and stated patient to get renal ultrasound now then go to dialysis after. Patient and wife made aware and voiced understanding. AVS reviewed with patient, voiced understanding. Patient discharged in wheelchair to main radiology.

## 2023-01-03 NOTE — H&P
Formulation and discussion of sedation / procedure plans, risks, benefits, side effects and alternatives with patient and/or responsible adult completed. History and Physical reviewed and unchanged.     Electronically signed by Mally Avina MD on 1/3/23 at 8:23 AM EST

## 2023-01-03 NOTE — DISCHARGE INSTRUCTIONS
Keep dressing clean, dry, intact. No showers. ACTIVITY:  Continue usual care with your doctor. Call your doctor immediately if any severe problems or go to the nearest emergency room. I have been treated and hereby acknowledge receiving this instruction sheet.

## 2023-01-03 NOTE — PROGRESS NOTES
0900: Patient back in room. Awaiting TPA infusion. 6751: TPA infusing in both dialysis catheter ports. Tolerating well. Resting in bed, call light in reach.

## 2023-01-05 ENCOUNTER — OFFICE VISIT (OUTPATIENT)
Dept: CARDIOLOGY CLINIC | Age: 63
End: 2023-01-05
Payer: MEDICARE

## 2023-01-05 VITALS
HEART RATE: 92 BPM | BODY MASS INDEX: 40.16 KG/M2 | HEIGHT: 73 IN | DIASTOLIC BLOOD PRESSURE: 62 MMHG | WEIGHT: 303 LBS | OXYGEN SATURATION: 92 % | SYSTOLIC BLOOD PRESSURE: 120 MMHG

## 2023-01-05 DIAGNOSIS — I50.32 CHRONIC DIASTOLIC HEART FAILURE (HCC): Primary | ICD-10-CM

## 2023-01-05 PROCEDURE — G8428 CUR MEDS NOT DOCUMENT: HCPCS | Performed by: INTERNAL MEDICINE

## 2023-01-05 PROCEDURE — G8417 CALC BMI ABV UP PARAM F/U: HCPCS | Performed by: INTERNAL MEDICINE

## 2023-01-05 PROCEDURE — 3078F DIAST BP <80 MM HG: CPT | Performed by: INTERNAL MEDICINE

## 2023-01-05 PROCEDURE — G8482 FLU IMMUNIZE ORDER/ADMIN: HCPCS | Performed by: INTERNAL MEDICINE

## 2023-01-05 PROCEDURE — 3074F SYST BP LT 130 MM HG: CPT | Performed by: INTERNAL MEDICINE

## 2023-01-05 PROCEDURE — 4004F PT TOBACCO SCREEN RCVD TLK: CPT | Performed by: INTERNAL MEDICINE

## 2023-01-05 PROCEDURE — 3017F COLORECTAL CA SCREEN DOC REV: CPT | Performed by: INTERNAL MEDICINE

## 2023-01-05 PROCEDURE — 99214 OFFICE O/P EST MOD 30 MIN: CPT | Performed by: INTERNAL MEDICINE

## 2023-01-05 RX ORDER — CARVEDILOL 3.12 MG/1
3.12 TABLET ORAL 2 TIMES DAILY
Qty: 60 TABLET | Refills: 2 | Status: SHIPPED | OUTPATIENT
Start: 2023-01-05 | End: 2023-02-04

## 2023-01-05 NOTE — PROGRESS NOTES
[address] [place] [address],[place] [address] [address] [place] [id number]  Dept: [phone]  Dept Fax: [phone]  Loc: [phone]    Visit Date: 1/5/2023    Mr. Rubina Mazariegos is a 63 y.o. male  who presented for:  Chief Complaint   Patient presents with    Check-Up    Cardiomyopathy    Congestive Heart Failure       HPI:   62 yo M  c hx of HTN, DM, Obesity, LAURIE on BIPAP, Cardiomyopathy, colon resection 2/2017 is here for a follow up. Underwent cardioMEMs device for CHF. He is on HD, recently underwent HD fistula. Left heart cath:  Clean coronaries    RIGHT HEART CATHETERIZATION:  1.  RA is 25.  2.  RV is 80/23, 27.  3.  PA is 82/44, 59.  4.  Wedge pressure is 35.  5.  LV is 188/28, 36.  6.  AO was 205/126, 160.  7.  AO saturation is 91%. 8.  PA saturation is 63%. 9.  Cardiac output by Cherelle method is 5.9. 10.  Cardiac index is 2.1.        Current Outpatient Medications:     carvedilol (COREG) 3.125 MG tablet, Take 1 tablet by mouth 2 times daily, Disp: 60 tablet, Rfl: 2    VITAMIN D PO, Take by mouth, Disp: , Rfl:     rOPINIRole (REQUIP) 0.5 MG tablet, take 1 tablet by mouth IN THE AFTERNOON AND 2 TABLETS AT NIGHT., Disp: 90 tablet, Rfl: 5    sevelamer (RENVELA) 800 MG tablet, Take 1 tablet by mouth 3 times daily, Disp: , Rfl:     doxepin (SINEQUAN) 25 MG capsule, take 2 capsules by mouth at bedtime, Disp: 60 capsule, Rfl: 3    tamsulosin (FLOMAX) 0.4 MG capsule, Take 1 capsule by mouth daily, Disp: 30 capsule, Rfl: 2    CPAP Machine MISC, by Does not apply route Please change bipap to 18/14 cm H20., Disp: 1 each, Rfl: 0    levothyroxine (SYNTHROID) 175 MCG tablet, Take 1 tablet by mouth Daily, Disp: 30 tablet, Rfl: 3    ipratropium-albuterol (DUONEB) 0.5-2.5 (3) MG/3ML SOLN nebulizer solution, Inhale 3 mLs into the lungs every 4 hours as needed for Shortness of Breath, Disp: 360 mL, Rfl: 1    montelukast (SINGULAIR) 10 MG tablet, Take 1 tablet by mouth nightly, Disp: 90 tablet, Rfl: 3    albuterol sulfate  (90 Base) MCG/ACT inhaler, Inhale 2 puffs into the lungs every 6 hours as needed for Wheezing or Shortness of Breath, Disp: 1 each, Rfl: 5    aspirin EC 81 MG EC tablet, Take 1 tablet by mouth daily, Disp: 90 tablet, Rfl: 1    nitroGLYCERIN (NITROSTAT) 0.4 MG SL tablet, Place 1 tablet under the tongue every 5 minutes as needed for Chest pain (up to 3 doses), Disp: 25 tablet, Rfl: 3    azelastine (ASTELIN) 137 MCG/SPRAY nasal spray, 1 spray by Nasal route as needed. Use in each nostril as directed, Disp: , Rfl:     gabapentin (NEURONTIN) 300 MG capsule, Take 1 capsule by mouth nightly for 30 days. , Disp: 30 capsule, Rfl: 0    melatonin 3 MG TABS tablet, Take 1.5 tablets by mouth nightly as needed (Sleep), Disp: 45 tablet, Rfl: 3    Past Medical History  Bakari Zarate  has a past medical history of Arthritis, Back problem, Bladder disease, CHF with unknown LVEF ([address] Chronic kidney disease, Constipation, Diabetes mellitus ([address] Hypertension, Hypertensive emergency, Hypertensive urgency, Sleep apnea, and Thyroid disease. Social History  Bakari Zarate  reports that he has never smoked. His smokeless tobacco use includes chew. He reports that he does not drink alcohol and does not use drugs. Family History  Bakari Zarate family history includes Arthritis in his brother; Cancer in his father and mother; Diabetes in his paternal grandmother; Heart Attack in his brother; Heart Disease in his father; Prostate Cancer in his brother; Stroke in his brother.     Past Surgical History   Past Surgical History:   Procedure Laterality Date    ABDOMEN SURGERY      ABDOMEN SURGERY N/A 3/25/2022    ABDOMINAL LYSIS OF ADHESIONS, EXPLANT OF INFECTED MESH performed by Roselyn Lind MD at [address]      no stents    CARPAL TUNNEL RELEASE Bilateral     COLONOSCOPY  2017    Dr. Rain Sen Left 12/27/2022    LEFT ARM ARM ARTERIO-VENOUS FISTULA CREATION performed by Alfonso Agarwal MD at [facility] [address] [address] [address]      x3 surgeries with 14 repairs    KNEE SURGERY Right 1980's    cartilage    AK EXPLORATORY LAPAROTOMY CELIOTOMY W/WO BIOPSY SPX N/A 2/5/2018    ABDOMINAL EXPLORATION WITH LYSIS OF COMPLICATED ADHESIONS WITH AN EXTENDED RIGHT COLON RESECTION performed by Edinson Wayne MD at [address] [facility] [address]:     REVIEW OF SYSTEMS  Constitutional: denies sweats, chills and fever  HENT: denies  congestion, sinus pressure, sneezing and sore throat. Eyes: denies  pain, discharge, redness and itching. Respiratory: denies apnea, cough  Gastrointestinal: denies blood in stool, constipation, diarrhea   Endocrine: denies cold intolerance, heat intolerance, polydipsia. Genitourinary: denies dysuria, enuresis, flank pain and hematuria. Musculoskeletal: denies arthralgias, joint swelling and neck pain. Neurological: denies numbness and headaches. Psychiatric/Behavioral: denies agitation, confusion, decreased concentration and dysphoric mood    All others reviewed and are negative. Objective:     /62   Pulse 92   Ht 6' 1\" (1.854 m)   Wt (!) 303 lb (137.4 kg)   SpO2 92% Comment: RA  BMI 39.98 kg/m²     Wt Readings from Last 3 Encounters:   01/05/23 (!) 303 lb (137.4 kg)   01/03/23 (!) 308 lb 10.3 oz (140 kg)   12/27/22 (!) 303 lb 12.8 oz (137.8 kg)     BP Readings from Last 3 Encounters:   01/05/23 120/62   01/03/23 (!) 140/78   12/27/22 110/61       PHYSICAL EXAM  Constitutional: noticeable short of breath, and fatigue  HENT:   Head: Normocephalic and atraumatic. Eyes: EOM are normal. Pupils are equal, round, and reactive to light. Neck: Normal range of motion. Neck supple. No JVD present. Cardiovascular: Normal rate , normal heart sounds and intact distal pulses. Pulmonary/Chest: Pleuritic pain on deep inspiration  Abdominal: Soft. Bowel sounds are normal. No distension.  There is no tenderness. Musculoskeletal: Normal range of motion. No edema. Neurological: Alert and oriented to person, place, and time. No cranial nerve deficit. Coordination normal.   Skin: Skin is warm and dry. Psychiatric: Normal mood and affect.        No results found for: CKTOTAL, CKMB, CKMBINDEX    Lab Results   Component Value Date/Time    WBC 4.6 08/17/2022 05:32 AM    RBC 2.87 08/17/2022 05:32 AM    RBC 5.15 08/12/2011 08:29 PM    HGB 7.5 08/17/2022 05:32 AM    HCT 24.6 08/17/2022 05:32 AM    MCV 85.7 08/17/2022 05:32 AM    MCH 26.1 08/17/2022 05:32 AM    MCHC 30.5 08/17/2022 05:32 AM    RDW 17.8 08/01/2022 10:13 AM     08/17/2022 05:32 AM    MPV 8.5 08/17/2022 05:32 AM       Lab Results   Component Value Date/Time     12/27/2022 08:57 AM    K 3.9 12/27/2022 08:57 AM     12/27/2022 08:57 AM    CO2 24 12/27/2022 08:57 AM    BUN 53 12/27/2022 08:57 AM    LABALBU 3.7 08/08/2022 07:09 PM    CREATININE 8.1 12/27/2022 08:57 AM    CALCIUM 8.3 12/27/2022 08:57 AM    LABGLOM 7 12/27/2022 08:57 AM    GLUCOSE 102 12/27/2022 08:57 AM       Lab Results   Component Value Date/Time    ALKPHOS 91 08/08/2022 07:09 PM    ALT 9 08/08/2022 07:09 PM    AST 9 08/08/2022 07:09 PM    PROT 8.4 08/08/2022 07:09 PM    BILITOT 0.3 08/08/2022 07:09 PM    BILIDIR <0.2 04/20/2022 12:50 PM    LABALBU 3.7 08/08/2022 07:09 PM       Lab Results   Component Value Date/Time    MG 2.5 08/09/2022 06:26 AM       Lab Results   Component Value Date    INR 1.14 (H) 06/16/2021    INR 1.06 12/01/2020    INR 1.03 07/02/2019         Lab Results   Component Value Date/Time    LABA1C 4.7 05/02/2022 02:47 PM       Lab Results   Component Value Date/Time    TRIG 107 05/25/2021 03:40 AM    HDL 35 05/25/2021 03:40 AM    LDLCALC 96 05/25/2021 03:40 AM       Lab Results   Component Value Date/Time    TSH 3.090 04/15/2021 12:20 PM         Testing Reviewed:      I haveindividually reviewed the below cardiac tests    EKG:    ECHO:   Results for orders placed during the hospital encounter of 04/26/19   ECHO Complete 2D W Doppler W Color    Narrative Transthoracic Echocardiography Report (TTE)     Demographics      Patient Name   Celso Cordero  Gender              Male                  A      MR #           [id number]       Race                                                     Ethnicity      Account #      [de-identified]       Room Number      Accession      [id number]       Date of Study       04/26/2019   Number      Date of Birth  1960      Referring Physician Elin Aguilar Ear DO      Age            66 year(s)      Betty Hodgson                                                      [facility]                                     Interpreting        Stephanie Acosta MD                                  Physician     Procedure    Type of Study      TTE procedure:ECHOCARDIOGRAM COMPLETE 2D W DOPPLER W COLOR. Procedure Date  Date: 04/26/2019 Start: 07:22 AM    Study Location: Echo Lab  Technical Quality: Poor visualization due to body habitus. Indications:Dyspnea on exertion. Additional Medical History:Sleep apnea, hypertension, hypothyroidism    Patient Status: Routine    Height: 73 inches Weight: 352.01 pounds BSA: 2.74 m^2 BMI: 46.44 kg/m^2    BP: 182/92 mmHg     Conclusions      Summary   Technically difficult examination. Left ventricular size is normal and systolic function is mildly reduced. Ejection fraction was estimated at 40-45%. LV wall thickness is within   normal limits. The right ventricular size appears normal with normal systolic function   and wall thickness.       Signature      ----------------------------------------------------------------   Electronically signed by Stephanie Acosta MD (Interpreting   physician) on 04/26/2019 at 05:05 PM   ----------------------------------------------------------------      Findings      Mitral Valve   The mitral valve was not well visualized . DOPPLER: The transmitral velocity was within the normal range with no   evidence for mitral stenosis. There was no evidence of mitral   regurgitation. Aortic Valve   The aortic valve leaflets were not well visualized. DOPPLER: Transaortic velocity was within the normal range with no evidence   of aortic stenosis. There was no evidence of aortic regurgitation. Tricuspid Valve   Tricuspid valve was not well visualized. DOPPLER: There is no evidence of tricuspid stenosis. There was no evidence   of tricuspid regurgitation. Pulmonic Valve   The pulmonic valve was not well visualized . Darling Rast DOPPLER: The transpulmonic velocity was within the normal range. No   evidence for regurgitation. Left Atrium   Left atrial size is normal.      Left Ventricle   Left ventricular size is normal and systolic function is mildly reduced. Ejection fraction was estimated at 40-45%. LV wall thickness is within   normal limits. Right Atrium   Right atrial size was normal.      Right Ventricle   The right ventricular size appears normal with normal systolic function   and wall thickness. Pericardial Effusion   The pericardium appears normal with no evidence of a pericardial effusion. Pleural Effusion   No evidence of pleural effusion. Aorta / Great Vessels   -Aortic root dimension within normal limits. -IVC size is within normal limits with normal respiratory phasic changes.      M-Mode/2D Measurements & Calculations      LV Diastolic   LV Systolic Dimension:    AV Cusp Separation: 2.3 cmLA   Dimension: 5.1 3.9 cm                    Dimension: 4.8 cmAO Root   cm             LV Volume Diastolic: 333  Dimension: 3.8 cmLA Area: 20.8   LV FS:23.5 %   ml                        cm^2   LV PW          LV Volume Systolic: 72.2   Diastolic: 1.4 ml   cm             LV EDV/LV EDV Index: 124   Septum         ml/45 m^2LV ESV/LV ESV    RV Diastolic Dimension: 3.4 cm   Diastolic: 1.4 Index: 10.4 ml/24 m^2   cm             EF Calculated: 46.9 %     LA/Aorta: 1.26                                            Ascending Aorta: 3.6 cm                                            LA volume/Index: 51.9 ml /19m^2     Doppler Measurements & Calculations      MV Peak E-Wave: 89.7  AV Peak Velocity: 155 LVOT Peak Velocity: 109 cm/s   cm/s                  cm/s                  LVOT Mean Velocity: 71.6 cm/s   MV Peak A-Wave: 81.9  AV Peak Gradient:     LVOT Peak Gradient: 5 mmHgLVOT   cm/s                  9.61 mmHg             Mean Gradient: 2 mmHg   MV E/A Ratio: 1.1     AV Mean Velocity:   MV Peak Gradient:     91.2 cm/s             TV Peak E-Wave: 29.8 cm/s   3.22 mmHg             AV Mean Gradient: 4   TV Peak A-Wave: 42.7 cm/s                         mmHg   MV Deceleration Time: AV VTI: 28.5 cm       TV Peak Gradient: 0.36 mmHg   268 msec   MV P1/2t: 78 msec                           PV Peak Velocity: 69.7 cm/s   MVA by PHT:2.82 cm^2  LVOT VTI: 24.8 cm     PV Peak Gradient: 1.94 mmHg                         IVRT: 95 msec   MV E' Septal   Velocity: 5.5 cm/s   MV A' Septal          AV DVI (VTI): 0.87AV   Velocity: 7.9 cm/s    DVI (Vmax):0.7   MV E' Lateral   Velocity: 9.4 cm/s   MV A' Lateral   Velocity: 11.3 cm/s   E/E' septal: 16.31   E/E' lateral: 9.54   MR Velocity: 373 cm/s     [id number]       STRESS:    CATH:    Assessment/Plan       Diagnosis Orders   1.  Chronic diastolic heart failure (HCC)            Chronic CHF exacerbation, s/p CardioMEMs insertion on 6/16/21  Cardiomyopathy, EF improved to 55-60%  Morbid Obesity BMI 39  LAURIE on BiPAP  DM  HTN-mildly uncontrolled     BP well controlled without medication  Will reduced coreg to 3.125mg BID  Continue aspirin  On HD  Has CHF clinic follow up  Advised to use the cardiomems pillow regularly  On BIPAP therapy  Has been recently placed on supplemental O2  Lost 30 lbs  Doing ok since being on HD  Continue rest of the management      Return in about 6 months (around 7/5/2023), or if symptoms worsen or fail to improve, for Regular follow up, Review testing.        Electronically signed by Brown Lund MD [facility]  1/5/2023 at 1:12 PM

## 2023-01-05 NOTE — PROGRESS NOTES
Pt here for 6 mo     Pt states med list is correct from 1/3/23    Pt has concerns about coreg     Pt continues  sob on exertion , on 2 liters O2 at times more fatigue , notice more indigestion

## 2023-01-08 DIAGNOSIS — G89.4 CHRONIC PAIN SYNDROME: ICD-10-CM

## 2023-01-09 RX ORDER — GABAPENTIN 300 MG/1
300 CAPSULE ORAL DAILY
Qty: 30 CAPSULE | Refills: 0 | Status: SHIPPED | OUTPATIENT
Start: 2023-01-09 | End: 2023-02-08

## 2023-01-09 NOTE — TELEPHONE ENCOUNTER
OARRS reviewed. UDS: + for  Gabapentin, Oxycodone, Amitriptyline, Doxepin. Last seen: 12/12/2022.  Follow-up: 2/20/2023

## 2023-01-10 ENCOUNTER — TELEPHONE (OUTPATIENT)
Dept: UROLOGY | Age: 63
End: 2023-01-10

## 2023-01-10 ENCOUNTER — OFFICE VISIT (OUTPATIENT)
Dept: UROLOGY | Age: 63
End: 2023-01-10
Payer: MEDICARE

## 2023-01-10 VITALS
HEIGHT: 73 IN | BODY MASS INDEX: 40.29 KG/M2 | WEIGHT: 304 LBS | DIASTOLIC BLOOD PRESSURE: 68 MMHG | SYSTOLIC BLOOD PRESSURE: 104 MMHG

## 2023-01-10 DIAGNOSIS — N40.1 BENIGN PROSTATIC HYPERPLASIA WITH URINARY FREQUENCY: ICD-10-CM

## 2023-01-10 DIAGNOSIS — Z01.818 PRE-OP TESTING: ICD-10-CM

## 2023-01-10 DIAGNOSIS — R31.0 GROSS HEMATURIA: ICD-10-CM

## 2023-01-10 DIAGNOSIS — N47.1 PHIMOSIS: Primary | ICD-10-CM

## 2023-01-10 DIAGNOSIS — R35.0 BENIGN PROSTATIC HYPERPLASIA WITH URINARY FREQUENCY: ICD-10-CM

## 2023-01-10 PROCEDURE — G8417 CALC BMI ABV UP PARAM F/U: HCPCS | Performed by: UROLOGY

## 2023-01-10 PROCEDURE — 4004F PT TOBACCO SCREEN RCVD TLK: CPT | Performed by: UROLOGY

## 2023-01-10 PROCEDURE — G8482 FLU IMMUNIZE ORDER/ADMIN: HCPCS | Performed by: UROLOGY

## 2023-01-10 PROCEDURE — 3074F SYST BP LT 130 MM HG: CPT | Performed by: UROLOGY

## 2023-01-10 PROCEDURE — 3017F COLORECTAL CA SCREEN DOC REV: CPT | Performed by: UROLOGY

## 2023-01-10 PROCEDURE — 3078F DIAST BP <80 MM HG: CPT | Performed by: UROLOGY

## 2023-01-10 PROCEDURE — 99214 OFFICE O/P EST MOD 30 MIN: CPT | Performed by: UROLOGY

## 2023-01-10 PROCEDURE — G8427 DOCREV CUR MEDS BY ELIG CLIN: HCPCS | Performed by: UROLOGY

## 2023-01-10 NOTE — TELEPHONE ENCOUNTER
SURGERY [address] [place] [address] [address] ZAC CULP AM OFFENEGG II.KELSEY, [address]      Phone *[phone]   Surgical Scheduling Direct Line Phone *[phone] Fax *[phone]      Umang Patient 1960 male    [address]  [employer]   Marital Status:          Home Phone: [phone]      Cell Phone:    Telephone Information:   Mobile [phone]          Surgeon: Dr. Luther Matthews Surgery Date: 2/2/2023   Time: 11:30am    Procedure: Cystoscopy and Circumcision    Diagnosis: Phimosis     Important Medical History:  In Epic    Special Inst/Equip: Regular    CPT Codes:    61722,47991  Latex Allergy: No     Cardiac Device:  No    Anesthesia:  General          Admission Type:  Same Day                        Admit Prior to Day of Surgery: No    Case Location:  Main OR            Preadmission Testing:  Phone Call          PAT Date and Time:______________________________________________________    PAT Confirmation #: ______________________________________________________    Post Op Visit: ___________________________________________________________    Need Preop Cardiac Clearance: Yes    Does Patient have Cardiologist/physician?      Dr. Iraj Natarajan Confirmation #: __________________________________________________    Daniel Heal: ________________________   Date: __________________________     [employer] Name: [facility]

## 2023-01-10 NOTE — TELEPHONE ENCOUNTER
Patient is scheduled with Dr. Luther Matthews on 2/2/2023. Surgery consent to be done upon arrival.  Dr. Andrew Bach to clear. Patient to do urine culture and fasting labs on 1/19/2023; orders mailed to patient. Surgery instructions mailed to patient. Patient will have an adult over the age of 25 with them at discharge and 24 hours after procedure.

## 2023-01-10 NOTE — TELEPHONE ENCOUNTER
Patient scheduled for Cystoscopy and Circumcision with Dr. Olu Farrell on 2/2/2023. We are requesting clearance from Dr. Padilla Daily. Thank you.

## 2023-01-10 NOTE — PROGRESS NOTES
Rin Smith MD        [address] [facility] [address]  Dept: [phone]  Dept Fax: [phone]: [address] [place] [address] Urology Office Note -     Patient:  Fatimah Esteves  YOB: 1960    The patient is a 63 y.o. male who presents today for evaluation of the following problems:   Chief Complaint   Patient presents with    Hematuria     On dialysis-unable to provide a urine sample          HISTORY OF PRESENT ILLNESS:     BPH- On flomax/ditropan stable. AUA- severe symptoms. Briefly discussed prostate outlet surgeries in past . Pt is esrd on HD. Gross hematuria- had hematuria with clots-- resolved with abx. Left testicular pain- resolved    Elevated PSA- psa stable      Had recent surgery for infected mesh        Summary of Previous Records:    Fatimah Esteves f/u today for BPH, OAB. He is benitez okay, reports he has not been taking Flomax or Ditropan, has been out of medication. He reports weak urinary stream with dribbling, denies nocturia incomplete bladder emptying, frequency. His incontinence has improved. He is having a really ahrd time with GI problems, has chronic diarrhea with stool incontinence. Requested/reviewed records from Lennox Hutch, DO office and/or outside physician/EMR    (Patient's old records have been requested, reviewed and pertinent findings summarized in today's note.)    Procedures Today: N/A    Last several PSA's:  Lab Results   Component Value Date    PSA 1.24 (H) 04/07/2022    PSA 0.82 04/15/2021    PSA 1.06 (H) 09/26/2020       Last total testosterone:  No results found for: TESTOSTERONE    Urinalysis today:  No results found for this visit on 01/10/23.     Last BUN and creatinine:  Lab Results   Component Value Date    BUN 53 (H) 12/27/2022     Lab Results   Component Value Date    CREATININE 8.1 ([facility]) 12/27/2022       Imaging Reviewed during this Office Visit: Laura Chakraborty MD independently reviewed the images and verified the radiology reports from:    No results found. PAST MEDICAL, FAMILY AND SOCIAL HISTORY:  Past Medical History:   Diagnosis Date    Arthritis     Back problem     back pain-sees [facility] pain mgmt    Bladder disease     Dr. Noreen Tran    CHF with unknown LVEF ([facility] 05/24/2021    Dr. George/CHF clinic    Chronic kidney disease     Constipation     Diabetes mellitus ([facility]     Dr. Judith Edmond    Hypertension     Hypertensive emergency 04/11/2019    Hypertensive urgency 04/13/2019    Sleep apnea     has bipap-sees [facility].  Honorio CNP    Thyroid disease      Past Surgical History:   Procedure Laterality Date    ABDOMEN SURGERY      ABDOMEN SURGERY N/A 3/25/2022    ABDOMINAL LYSIS OF ADHESIONS, EXPLANT OF INFECTED MESH performed by Preet Bragg MD at [address]      no stents    CARPAL TUNNEL RELEASE Bilateral     COLONOSCOPY  2017    Dr. Izzy Alejo Left 12/27/2022    LEFT ARM ARM ARTERIO-VENOUS FISTULA CREATION performed by Maribell George MD at [facility], [address] [place] [address] Rd      x3 surgeries with 14 repairs    KNEE SURGERY Right 1980's    cartilage    LA EXPLORATORY LAPAROTOMY CELIOTOMY W/WO BIOPSY SPX N/A 2/5/2018    ABDOMINAL EXPLORATION WITH LYSIS OF COMPLICATED ADHESIONS WITH AN EXTENDED RIGHT COLON RESECTION performed by Preet Bragg MD at [place] [facility]     Family History   Problem Relation Age of Onset    Cancer Mother         breast    Heart Disease Father         bladder, lung    Cancer Father     Stroke Brother     Heart Attack Brother     Prostate Cancer Brother     Diabetes Paternal Grandmother     Arthritis Brother     High Blood Pressure Neg Hx      Outpatient Medications Marked as Taking for the 1/10/23 encounter (Office Visit) with Temitope Gabriel MD   Medication Sig Dispense Refill    gabapentin (NEURONTIN) 300 MG capsule Take 1 capsule by mouth daily for 30 days. 30 capsule 0    carvedilol (COREG) 3.125 MG tablet Take 1 tablet by mouth 2 times daily 60 tablet 2    VITAMIN D PO Take by mouth      rOPINIRole (REQUIP) 0.5 MG tablet take 1 tablet by mouth IN THE AFTERNOON AND 2 TABLETS AT NIGHT. 90 tablet 5    sevelamer (RENVELA) 800 MG tablet Take 1 tablet by mouth 3 times daily      doxepin (SINEQUAN) 25 MG capsule take 2 capsules by mouth at bedtime 60 capsule 3    tamsulosin (FLOMAX) 0.4 MG capsule Take 1 capsule by mouth daily 30 capsule 2    CPAP Machine MISC by Does not apply route Please change bipap to 18/14 cm H20. 1 each 0    levothyroxine (SYNTHROID) 175 MCG tablet Take 1 tablet by mouth Daily 30 tablet 3    ipratropium-albuterol (DUONEB) 0.5-2.5 (3) MG/3ML SOLN nebulizer solution Inhale 3 mLs into the lungs every 4 hours as needed for Shortness of Breath 360 mL 1    montelukast (SINGULAIR) 10 MG tablet Take 1 tablet by mouth nightly 90 tablet 3    albuterol sulfate  (90 Base) MCG/ACT inhaler Inhale 2 puffs into the lungs every 6 hours as needed for Wheezing or Shortness of Breath 1 each 5    aspirin EC 81 MG EC tablet Take 1 tablet by mouth daily 90 tablet 1    nitroGLYCERIN (NITROSTAT) 0.4 MG SL tablet Place 1 tablet under the tongue every 5 minutes as needed for Chest pain (up to 3 doses) 25 tablet 3    azelastine (ASTELIN) 137 MCG/SPRAY nasal spray 1 spray by Nasal route as needed.  Use in each nostril as directed         Shellfish-derived products, Pcn [penicillins], Succinylcholine, Sulfa antibiotics, Morphine, and Tape [adhesive tape]  Social History     Tobacco Use   Smoking Status Never   Smokeless Tobacco Current    Types: Chew   Tobacco Comments    quit pipe over 30 yrs ago      (If patient a smoker, smoking cessation counseling offered)   Social History     Substance and Sexual Activity   Alcohol Use No    Alcohol/week: 0.0 standard drinks    Comment: quit       REVIEW OF SYSTEMS:  Constitutional: negative  Eyes: negative  Respiratory: negative  Cardiovascular: negative  Gastrointestinal: negative  Genitourinary: see HPI  Musculoskeletal: negative  Skin: negative   Neurological: negative  Hematological/Lymphatic: negative  Psychological: negative      Physical Exam:    This a 63 y.o. male  Vitals:    01/10/23 1042   BP: 104/68     Body mass index is 40.11 kg/m². Constitutional: Patient in no acute distress;         Assessment and Plan        1. Phimosis    2. Gross hematuria    3. Benign prostatic hyperplasia with urinary frequency               Plan:        Dialysis patient    Phimosis- worsening. Needs circumcision    BPH- refilled ditropan and flomax. Stable urinary symptoms. [address]. Incomplete emptying. Left testicular pain- resolved    Gross hematuria- worsening. Needs cystoscopy    Cystoscopy and circumcision (60 gen) . Do cystoscopy with rigid cystoscope. Prescriptions Ordered:  No orders of the defined types were placed in this encounter. Orders Placed:  No orders of the defined types were placed in this encounter.            Violet Mora MD

## 2023-01-10 NOTE — TELEPHONE ENCOUNTER
DO NOT TAKE FISH OIL, IBUPROFEN, MOTRIN-LIKE DRUGS AND ANY MULTIVITAMINS OR OVER THE COUNTER SUPPLEMENTS 14 DAYS PRIOR TO SURGERY. HOLD ASPIRIN FOR 5 DAYS PRIOR TO SURGERY. MUST HAVE AN ADULT OVER THE AGE OF 18 WITH YOU AT THE TIME OF THE PROCEDURE AND WITH YOU AT HOME AFTER THE PROCEDURE FOR 24 HOURS       Harinder Myles 1960 Diagnosis:     Surgical Physician: Dr. Lr Null have been scheduled for the procedure marked below:      Surgery: Cystoscopy and Circumcision         Date: 2/2/2023     Anesthesia: Anesthesiologist (General/Spinal)     Place of Service: [address] [place] [address] Second Floor Same Day Surgery         Arrive to same day surgery by:  9:30am  (Surgery time is subject to change)      INSTRUCTIONS AS MARKED BELOW:    1.  DO NOT eat or drink anything after midnight before surgery. 2.  We prefer you shower or bathe with an antibacterial soap (Dial) the morning of surgery. 3  Please bring a current medication list, photo ID and insurance card(s) with you  4. Okay to take Tylenol  5. If you take Glucophage, Metformin or Janumet, hold 48-hours prior to surgery  6. Take blood pressure or heart medication as directed, if taken in the morning take with a small sip of water  7. The office will call you in 1-2 days after your procedure to schedule a follow up. DATE SENSITIVE TESTING-DO ON THE DATE LISTED*WALK IN *(OUTPATIENT [facility] TESTING IN [facility]) NO APPOINTMENT    DO URINE CULTURE AND FASTING LABS ON 1/19/2023. ORDERS INCLUDED.         Date: 1/10/2023

## 2023-01-12 ENCOUNTER — PATIENT MESSAGE (OUTPATIENT)
Dept: PHYSICAL MEDICINE AND REHAB | Age: 63
End: 2023-01-12

## 2023-01-12 DIAGNOSIS — G89.4 CHRONIC PAIN SYNDROME: ICD-10-CM

## 2023-01-12 RX ORDER — OXYCODONE AND ACETAMINOPHEN 10; 325 MG/1; MG/1
1 TABLET ORAL EVERY 8 HOURS PRN
Qty: 90 TABLET | Refills: 0 | Status: SHIPPED | OUTPATIENT
Start: 2023-01-12 | End: 2023-02-11

## 2023-01-12 NOTE — TELEPHONE ENCOUNTER
OARRS reviewed. UDS: + for  . Gabapentin, oxycodone, amitriptyline, doxepin  Last seen: 12/12/2022.  Follow-up:   Future Appointments   Date Time Provider Ismael Haas   1/16/2023  9:00 AM Nasima Madsen MD N SRPX CT/CV [facility]   2/10/2023 12:00 PM Rylan Ruffin APRN - CNP [facility] [address]   2/20/2023 12:00 PM SAHIL Suggs - CNP N SRPX Pain [facility]   4/13/2023 12:45 PM Rosa Hughes MD N [place] Mon [address]   4/27/2023  3:00 PM Monica Rajan MD N SRPX Heart [address]

## 2023-01-16 ENCOUNTER — OFFICE VISIT (OUTPATIENT)
Dept: CARDIOTHORACIC SURGERY | Age: 63
End: 2023-01-16

## 2023-01-16 VITALS
DIASTOLIC BLOOD PRESSURE: 77 MMHG | BODY MASS INDEX: 40.5 KG/M2 | HEIGHT: 73 IN | WEIGHT: 305.6 LBS | SYSTOLIC BLOOD PRESSURE: 129 MMHG | HEART RATE: 82 BPM

## 2023-01-16 DIAGNOSIS — N18.6 END STAGE RENAL DISEASE (HCC): Primary | ICD-10-CM

## 2023-01-16 PROCEDURE — 99024 POSTOP FOLLOW-UP VISIT: CPT | Performed by: THORACIC SURGERY (CARDIOTHORACIC VASCULAR SURGERY)

## 2023-01-16 NOTE — PROGRESS NOTES
[address] [facility] SURGERY  [address]  Dept: [phone]  Dept Fax: [phone]: [phone]    Visit Date: 1/16/2023    Mr. Servando Louise is a 63 y.o.male  who presented for:  Chief Complaint   Patient presents with    Post-Op Check     fistula       HPI:   HPI     Mr. Servando Louise returns to clinic for followup after left brachio-cephalic AVF on 18/29/08. He is doing very well and has no complaints. Denies any wound issues, left hand coolness or pain. Excellent thrill noted over left upper arm. HD M,Tu, Th, Friday without difficulty via the right IJ permacath. Current Outpatient Medications:     oxyCODONE-acetaminophen (PERCOCET)  MG per tablet, Take 1 tablet by mouth every 8 hours as needed for Pain for up to 30 days. Intended supply: 30 days, Disp: 90 tablet, Rfl: 0    gabapentin (NEURONTIN) 300 MG capsule, Take 1 capsule by mouth daily for 30 days. , Disp: 30 capsule, Rfl: 0    carvedilol (COREG) 3.125 MG tablet, Take 1 tablet by mouth 2 times daily, Disp: 60 tablet, Rfl: 2    VITAMIN D PO, Take by mouth, Disp: , Rfl:     rOPINIRole (REQUIP) 0.5 MG tablet, take 1 tablet by mouth IN THE AFTERNOON AND 2 TABLETS AT NIGHT., Disp: 90 tablet, Rfl: 5    sevelamer (RENVELA) 800 MG tablet, Take 1 tablet by mouth 3 times daily, Disp: , Rfl:     doxepin (SINEQUAN) 25 MG capsule, take 2 capsules by mouth at bedtime, Disp: 60 capsule, Rfl: 3    CPAP Machine MISC, by Does not apply route Please change bipap to 18/14 cm H20., Disp: 1 each, Rfl: 0    levothyroxine (SYNTHROID) 175 MCG tablet, Take 1 tablet by mouth Daily, Disp: 30 tablet, Rfl: 3    melatonin 3 MG TABS tablet, Take 1.5 tablets by mouth nightly as needed (Sleep), Disp: 45 tablet, Rfl: 3    ipratropium-albuterol (DUONEB) 0.5-2.5 (3) MG/3ML SOLN nebulizer solution, Inhale 3 mLs into the lungs every 4 hours as needed for Shortness of Breath, Disp: 360 mL, Rfl: 1    albuterol sulfate  (90 Base) MCG/ACT inhaler, Inhale 2 puffs into the lungs every 6 hours as needed for Wheezing or Shortness of Breath, Disp: 1 each, Rfl: 5    aspirin EC 81 MG EC tablet, Take 1 tablet by mouth daily, Disp: 90 tablet, Rfl: 1    nitroGLYCERIN (NITROSTAT) 0.4 MG SL tablet, Place 1 tablet under the tongue every 5 minutes as needed for Chest pain (up to 3 doses), Disp: 25 tablet, Rfl: 3    azelastine (ASTELIN) 137 MCG/SPRAY nasal spray, 1 spray by Nasal route as needed. Use in each nostril as directed, Disp: , Rfl:     Allergies   Allergen Reactions    Shellfish-Derived Products Swelling     Tolerates IV dye without any problems      Pcn [Penicillins] Itching    Succinylcholine      Pseudocholinesterase Deficiency    Sulfa Antibiotics Itching    Morphine Nausea And Vomiting     \"head swimming, skin crawling\"    Tape Phyllis Metz Tape] Rash       Past Medical History  Ivonne Gramajo  has a past medical history of Arthritis, Back problem, Bladder disease, CHF with unknown LVEF ([facility] [address] Chronic kidney disease, Constipation, Diabetes mellitus ([facility] [address] Hypertension, Hypertensive emergency, Hypertensive urgency, Sleep apnea, and Thyroid disease. Social History  Ivonne Gramajo  reports that he has never smoked. His smokeless tobacco use includes chew. He reports that he does not drink alcohol and does not use drugs. Family History  Ivonne Gramajo family history includes Arthritis in his brother; Cancer in his father and mother; Diabetes in his paternal grandmother; Heart Attack in his brother; Heart Disease in his father; Prostate Cancer in his brother; Stroke in his brother. There is no family history of bicuspid aortic valve, aneurysms, heart transplant, pacemakers, defibrillators, or sudden cardiac death.       Past Surgical History   Past Surgical History:   Procedure Laterality Date    ABDOMEN SURGERY      ABDOMEN SURGERY N/A 3/25/2022    ABDOMINAL LYSIS OF ADHESIONS, EXPLANT OF INFECTED MESH performed by To Boothe MD at [address]      no stents    CARPAL TUNNEL RELEASE Bilateral     COLONOSCOPY  2017    Dr. Jv Silvestre Left 12/27/2022    LEFT ARM ARM ARTERIO-VENOUS FISTULA CREATION performed by Carlotta Hawk MD at [facility], [address]      x3 surgeries with 14 repairs    KNEE SURGERY Right 1980's    cartilage    [place] EXPLORATORY LAPAROTOMY CELIOTOMY W/WO BIOPSY SPX N/A 2/5/2018    ABDOMINAL EXPLORATION WITH LYSIS OF COMPLICATED ADHESIONS WITH AN EXTENDED RIGHT COLON RESECTION performed by To Boothe MD at [facility]:     Review of Systems  Negative except for HPI  Objective:     /77   Pulse 82   Ht 6' 1\" (1.854 m)   Wt (!) 305 lb 9.6 oz (138.6 kg)   BMI 40.32 kg/m²     Wt Readings from Last 3 Encounters:   01/16/23 (!) 305 lb 9.6 oz (138.6 kg)   01/10/23 (!) 304 lb (137.9 kg)   01/05/23 (!) 303 lb (137.4 kg)     BP Readings from Last 3 Encounters:   01/16/23 129/77   01/10/23 104/68   01/05/23 120/62       Physical Exam  Left upper extremity:  ++thrill over left BC  AVF  2+ radial pulse  Incision healed.     Right IJ permacath: clean, no discharge or erythema  Lab Results   Component Value Date/Time    WBC 4.6 08/17/2022 05:32 AM    RBC 2.87 08/17/2022 05:32 AM    RBC 5.15 08/12/2011 08:29 PM    HGB 7.5 08/17/2022 05:32 AM    HCT 24.6 08/17/2022 05:32 AM    MCV 85.7 08/17/2022 05:32 AM    MCH 26.1 08/17/2022 05:32 AM    MCHC 30.5 08/17/2022 05:32 AM    RDW 17.8 08/01/2022 10:13 AM     08/17/2022 05:32 AM    MPV 8.5 08/17/2022 05:32 AM       Lab Results   Component Value Date/Time     12/27/2022 08:57 AM    K 3.9 12/27/2022 08:57 AM     12/27/2022 08:57 AM    CO2 24 12/27/2022 08:57 AM    BUN 53 12/27/2022 08:57 AM    LABALBU 3.7 08/08/2022 07:09 PM    CREATININE 8.1 12/27/2022 08:57 AM    CALCIUM 8.3 12/27/2022 08:57 AM    LABGLOM 7 12/27/2022 08:57 AM    GLUCOSE 102 12/27/2022 08:57 AM       Lab Results   Component Value Date/Time    ALKPHOS 91 08/08/2022 07:09 PM    ALT 9 08/08/2022 07:09 PM    AST 9 08/08/2022 07:09 PM    PROT 8.4 08/08/2022 07:09 PM    BILITOT 0.3 08/08/2022 07:09 PM    BILIDIR <0.2 04/20/2022 12:50 PM    LABALBU 3.7 08/08/2022 07:09 PM       Lab Results   Component Value Date/Time    MG 2.5 08/09/2022 06:26 AM       Lab Results   Component Value Date    INR 1.14 (H) 06/16/2021    INR 1.06 12/01/2020    INR 1.03 07/02/2019         Lab Results   Component Value Date/Time    LABA1C 4.7 05/02/2022 02:47 PM       Lab Results   Component Value Date/Time    TRIG 107 05/25/2021 03:40 AM    HDL 35 05/25/2021 03:40 AM    LDLCALC 96 05/25/2021 03:40 AM       Lab Results   Component Value Date/Time    TSH 3.090 04/15/2021 12:20 PM           Assessment/Plan     1. End stage renal disease ([facility] [address]      No orders of the defined types were placed in this encounter. No orders of the defined types were placed in this encounter. Plan:  Return to clinic in 3 weeks. If good function and maturation, will remove permacath at that time, and clear patient for use of new left AVF. Return in about 3 weeks (around 2/6/2023).       Electronically signed by Marci Hernández MD   1/16/2023 at 9:03 AM EST

## 2023-01-17 ENCOUNTER — HOSPITAL ENCOUNTER (OUTPATIENT)
Dept: GENERAL RADIOLOGY | Age: 63
Discharge: HOME | End: 2023-01-17
Payer: MEDICARE

## 2023-01-17 ENCOUNTER — HOSPITAL ENCOUNTER (OUTPATIENT)
Age: 63
Discharge: HOME | End: 2023-01-17
Payer: MEDICARE

## 2023-01-17 DIAGNOSIS — G89.4 CHRONIC PAIN SYNDROME: ICD-10-CM

## 2023-01-17 DIAGNOSIS — M47.816 SPONDYLOSIS OF LUMBAR REGION WITHOUT MYELOPATHY OR RADICULOPATHY: ICD-10-CM

## 2023-01-17 DIAGNOSIS — F11.90 CHRONIC, CONTINUOUS USE OF OPIOIDS: ICD-10-CM

## 2023-01-17 DIAGNOSIS — M25.562 CHRONIC PAIN OF LEFT KNEE: ICD-10-CM

## 2023-01-17 DIAGNOSIS — M47.816 LUMBAR FACET ARTHROPATHY: ICD-10-CM

## 2023-01-17 DIAGNOSIS — G89.29 CHRONIC PAIN OF LEFT KNEE: ICD-10-CM

## 2023-01-17 LAB
T4 FREE: 1.23 NG/DL (ref 0.93–1.76)
TSH SERPL DL<=0.05 MIU/L-ACNC: 4.66 UIU/ML (ref 0.4–4.2)

## 2023-01-17 PROCEDURE — 84439 ASSAY OF FREE THYROXINE: CPT

## 2023-01-17 PROCEDURE — 36415 COLL VENOUS BLD VENIPUNCTURE: CPT

## 2023-01-17 PROCEDURE — 83036 HEMOGLOBIN GLYCOSYLATED A1C: CPT

## 2023-01-17 PROCEDURE — 72100 X-RAY EXAM L-S SPINE 2/3 VWS: CPT

## 2023-01-17 PROCEDURE — 73562 X-RAY EXAM OF KNEE 3: CPT

## 2023-01-17 PROCEDURE — 84443 ASSAY THYROID STIM HORMONE: CPT

## 2023-01-18 LAB
AVERAGE GLUCOSE: 84 MG/DL (ref 70–126)
HBA1C MFR BLD: 4.8 % (ref 4.4–6.4)

## 2023-01-19 ENCOUNTER — TELEPHONE (OUTPATIENT)
Dept: UROLOGY | Age: 63
End: 2023-01-19

## 2023-01-23 RX ORDER — SODIUM CHLORIDE 9 MG/ML
INJECTION, SOLUTION INTRAVENOUS PRN
OUTPATIENT
Start: 2023-01-23

## 2023-01-23 RX ORDER — SODIUM CHLORIDE 0.9 % (FLUSH) 0.9 %
5-40 SYRINGE (ML) INJECTION EVERY 12 HOURS SCHEDULED
OUTPATIENT
Start: 2023-01-23

## 2023-01-23 RX ORDER — IPRATROPIUM BROMIDE AND ALBUTEROL SULFATE 2.5; .5 MG/3ML; MG/3ML
1 SOLUTION RESPIRATORY (INHALATION) EVERY 4 HOURS PRN
OUTPATIENT
Start: 2023-01-23

## 2023-01-23 RX ORDER — SODIUM CHLORIDE 0.9 % (FLUSH) 0.9 %
5-40 SYRINGE (ML) INJECTION PRN
OUTPATIENT
Start: 2023-01-23

## 2023-01-24 ENCOUNTER — HOSPITAL ENCOUNTER (OUTPATIENT)
Dept: INTERVENTIONAL RADIOLOGY/VASCULAR | Age: 63
Discharge: HOME | End: 2023-01-24
Payer: MEDICARE

## 2023-01-24 VITALS
SYSTOLIC BLOOD PRESSURE: 147 MMHG | RESPIRATION RATE: 16 BRPM | DIASTOLIC BLOOD PRESSURE: 70 MMHG | TEMPERATURE: 97 F | HEART RATE: 68 BPM | OXYGEN SATURATION: 95 %

## 2023-01-24 DIAGNOSIS — N18.6 ESRD ON HEMODIALYSIS (HCC): ICD-10-CM

## 2023-01-24 DIAGNOSIS — Z99.2 ESRD ON HEMODIALYSIS (HCC): ICD-10-CM

## 2023-01-24 PROCEDURE — 36593 DECLOT VASCULAR DEVICE: CPT

## 2023-01-24 PROCEDURE — 36598 INJ W/FLUOR EVAL CV DEVICE: CPT

## 2023-01-24 PROCEDURE — 96366 THER/PROPH/DIAG IV INF ADDON: CPT

## 2023-01-24 PROCEDURE — 2709999900 IR CONTRAST INJECTION  EXISTING CV ACCESS DEVICE EVALUATION W FLUORO

## 2023-01-24 PROCEDURE — 6360000002 HC RX W HCPCS: Performed by: RADIOLOGY

## 2023-01-24 PROCEDURE — 2580000003 HC RX 258: Performed by: RADIOLOGY

## 2023-01-24 PROCEDURE — 96368 THER/DIAG CONCURRENT INF: CPT

## 2023-01-24 PROCEDURE — 96365 THER/PROPH/DIAG IV INF INIT: CPT

## 2023-01-24 RX ADMIN — ALTEPLASE 2 MG: 2.2 INJECTION, POWDER, LYOPHILIZED, FOR SOLUTION INTRAVENOUS at 13:55

## 2023-01-24 NOTE — PROGRESS NOTES
1330- Patient arrived to OPN via wheelchair for TPA from IR. Vitals stable. Call light placed within reach. PT RIGHTS AND RESPONSIBILITIES OFFERED TO PT.     1355- TPA started. Patient offered snack and drink. Denies further needs. 1530- Patient resting. IV still infusing. 1655- Infusion complete. Patient tolerated well with no issues. Vitals stable. Both ports had brisk blood return and flushed. IR notified and at bedside. 5- Discharge instructions reviewed with patient and family. Verbalized understanding with no further questions. AVS provided. Discharged ambulatory to [facility] in stable condition.      __M__ Safety:       (Environmental)  [place] to environment  Ensure ID band is correct and in place/ allergy band as needed  Assess for fall risk  Initiate fall precautions as applicable (fall band, side rails, etc.)  Call light within reach  Bed in low position/ wheels locked    __M__ Pain:       Assess pain level and characteristics  Administer analgesics as ordered  Assess effectiveness of pain management and report to MD as needed    __M__ Knowledge Deficit:  Assess baseline knowledge  Provide teaching at level of understanding  Provide teaching via preferred learning method  Evaluate teaching effectiveness    __M__ Hemodynamic/Respiratory Status:       (Pre and Post Procedure Monitoring)  Assess/Monitor vital signs and LOC  Assess Baseline SpO2 prior to any sedation  Obtain weight/height  Assess vital signs/ LOC until patient meets discharge criteria  Monitor procedure site and notify MD of any issues    __M__ Infection-Risk of Central Venous Catheter:  Monitor for infection signs and symptoms (catheter site redness, temperature elevation, etc)  Assess for infection risks  Educate regarding infection prevention  Manage central venous catheter (flushes/ dressing changes per protocol)

## 2023-01-24 NOTE — H&P
Formulation and discussion of sedation / procedure plans, risks, benefits, side effects and alternatives with patient and/or responsible adult completed. History and Physical reviewed and unchanged.     Electronically signed by Selwyn Herrera MD on 1/24/23 at 1:33 PM EST

## 2023-01-24 NOTE — DISCHARGE INSTRUCTIONS
ACTIVITY:  Continue usual care with your doctor. Call your doctor immediately if any severe problems or go to the nearest emergency room. I have been treated and hereby acknowledge receiving this instruction sheet. Proceed with dialysis as previously scheduled.  Received TPA on 1/24/23

## 2023-01-24 NOTE — PROGRESS NOTES
Pt in IR for evaluation of tunneled HD catheter. Dr Melina Salazar at bedside to evaluate and inject with contrast. Plan to run TPA through blue and red ports. Pt agreeable to plan. OPN graciously accepting pt for infusion and will start it in their department. Orders placed for TPA and pharmacy notified to deliver to OPN ASAP. Pt taken per wheelchair to OPN.

## 2023-01-24 NOTE — OP NOTE
Department of Radiology  Post Procedure Progress Note      Pre-Procedure Diagnosis:  Malfunctioning dialysis cath     Procedure Performed:  cath check     Anesthesia: local    Findings: fibrin sheath and cath occlusion, will drip tpa     Immediate Complications:  None    Estimated Blood Loss: minimal    SEE DICTATED PROCEDURE NOTE FOR COMPLETE DETAILS.     Ly Landon MD   1/24/2023 2:16 PM

## 2023-01-31 RX ORDER — MONTELUKAST SODIUM 10 MG/1
10 TABLET ORAL NIGHTLY
COMMUNITY
Start: 2022-10-26

## 2023-01-31 RX ORDER — CARVEDILOL 25 MG/1
25 TABLET ORAL 2 TIMES DAILY
COMMUNITY
Start: 2023-01-09

## 2023-01-31 RX ORDER — NALOXONE HYDROCHLORIDE 4 MG/.1ML
1 SPRAY NASAL PRN
COMMUNITY
Start: 2022-12-27

## 2023-01-31 NOTE — PROGRESS NOTES
Pt is unable to answer questions at this time for his chart. He said he would be available later today and to call him back. He is getting CBC done while he is out.

## 2023-01-31 NOTE — FLOWSHEET NOTE
Follow all instructions given by your physician  NPO after midnight   Sips of water am of surgery with allowed medications  Bring insurance info and 's license  Wear comfortable clean, loose fitting clothing  No jewelry or contact lenses to be worn day of surgery  No glue on dentures morning of surgery;you will be asked to remove them for surgery. Case for glasses. Shower night before and morning of surgery with a liquid antibacterial soap, dry with fresh clean towel; no lotions, creams or powder. Clean sheets and pillow case on bed night before surgery  Bring medications in original bottles  Bring CPAP/BIPAP machine if you have one ( you may be charged if one is needed in recovery room )    Please refer to the SSI-Surgical Site Infection Flyer you hopefully received in the mail-together we can prevent infections; signs and symptoms reviewed. When discharged be sure you understand how to care for your wound and that you have the phone # to call if you have any concerns or questions about your wound.  needed at discharge and someone over 25 to stay with you for 24 hours overnight (surgery may be cancelled if you don't have this) wife   Report to [facility] on 2nd floor  If you would become ill prior to surgery, please call the surgeon  May have a visitor with you, we request that you limit to 2 visitors in pre-op area  Masks are recommended but not required, new masks at entrance desk  Call [phone] for any questions    Covid questionnaire Complete; Patient negative for symptoms or exposure. See documentation.

## 2023-02-02 DIAGNOSIS — N47.1 PHIMOSIS: Primary | ICD-10-CM

## 2023-02-02 DIAGNOSIS — Z01.818 PRE-OP TESTING: ICD-10-CM

## 2023-02-02 DIAGNOSIS — N40.1 BENIGN PROSTATIC HYPERPLASIA WITH URINARY FREQUENCY: ICD-10-CM

## 2023-02-02 DIAGNOSIS — R35.0 BENIGN PROSTATIC HYPERPLASIA WITH URINARY FREQUENCY: ICD-10-CM

## 2023-02-03 ENCOUNTER — HOSPITAL ENCOUNTER (OUTPATIENT)
Dept: INTERVENTIONAL RADIOLOGY/VASCULAR | Age: 63
Discharge: HOME | End: 2023-02-03
Payer: MEDICARE

## 2023-02-03 VITALS
SYSTOLIC BLOOD PRESSURE: 139 MMHG | DIASTOLIC BLOOD PRESSURE: 67 MMHG | OXYGEN SATURATION: 98 % | HEART RATE: 62 BPM | RESPIRATION RATE: 16 BRPM

## 2023-02-03 DIAGNOSIS — T85.698D: ICD-10-CM

## 2023-02-03 PROCEDURE — 6360000002 HC RX W HCPCS: Performed by: RADIOLOGY

## 2023-02-03 PROCEDURE — 36593 DECLOT VASCULAR DEVICE: CPT

## 2023-02-03 PROCEDURE — 96365 THER/PROPH/DIAG IV INF INIT: CPT

## 2023-02-03 PROCEDURE — 96366 THER/PROPH/DIAG IV INF ADDON: CPT

## 2023-02-03 PROCEDURE — 2580000003 HC RX 258: Performed by: RADIOLOGY

## 2023-02-03 PROCEDURE — 6360000004 HC RX CONTRAST MEDICATION: Performed by: RADIOLOGY

## 2023-02-03 PROCEDURE — 96368 THER/DIAG CONCURRENT INF: CPT

## 2023-02-03 PROCEDURE — 36598 INJ W/FLUOR EVAL CV DEVICE: CPT

## 2023-02-03 PROCEDURE — 2709999900 IR FLUOROSCOPY LESS THAN 1 HOUR

## 2023-02-03 RX ADMIN — ALTEPLASE 2 MG: 2.2 INJECTION, POWDER, LYOPHILIZED, FOR SOLUTION INTRAVENOUS at 10:16

## 2023-02-03 RX ADMIN — ALTEPLASE 2 MG: 2.2 INJECTION, POWDER, LYOPHILIZED, FOR SOLUTION INTRAVENOUS at 10:17

## 2023-02-03 RX ADMIN — ALTEPLASE 2 MG: 2.2 INJECTION, POWDER, LYOPHILIZED, FOR SOLUTION INTRAVENOUS at 13:35

## 2023-02-03 RX ADMIN — IOPAMIDOL 8 ML: 612 INJECTION, SOLUTION INTRAVENOUS at 09:56

## 2023-02-03 ASSESSMENT — PAIN DESCRIPTION - DESCRIPTORS: DESCRIPTORS: ACHING

## 2023-02-03 ASSESSMENT — PAIN SCALES - GENERAL: PAINLEVEL_OUTOF10: 5

## 2023-02-03 ASSESSMENT — PAIN DESCRIPTION - LOCATION: LOCATION: BACK

## 2023-02-03 NOTE — PROGRESS NOTES
1010: Patient arrived to \[facility]\"" via cart. Patient rights and responsibilities offered to patient. Vitals as charted. TPA started in arterial and venous line of dialysis catheter. Dr. Wendy Garduno called to see if he can eat/drink and he stated absolutely. Patient provided with snack and beverage.    Resting in bed, call light in reach.                                 _m___ Safety:       (Environmental)  [place] to environment  Ensure ID band is correct and in place/ allergy band as needed  Assess for fall risk  Initiate fall precautions as applicable (fall band, side rails, etc.)  Call light within reach  Bed in low position/ wheels locked    _m___ Pain:       Assess pain level and characteristics  Administer analgesics as ordered  Assess effectiveness of pain management and report to MD as needed    _m___ Knowledge Deficit:  Assess baseline knowledge  Provide teaching at level of understanding  Provide teaching via preferred learning method  Evaluate teaching effectiveness    _m___ Hemodynamic/Respiratory Status:       (Pre and Post Procedure Monitoring)  Assess/Monitor vital signs and LOC  Assess Baseline SpO2 prior to any sedation  Obtain weight/height  Assess vital signs/ LOC until patient meets discharge criteria  Monitor procedure site and notify MD of any issues    _m___ Infection-Risk of Central Venous Catheter:  Monitor for infection signs and symptoms (catheter site redness, temperature elevation, etc)  Assess for infection risks  Educate regarding infection prevention  Manage central venous catheter (flushes/ dressing changes per protocol)

## 2023-02-03 NOTE — PROGRESS NOTES
1310- TPA infusions complete. Patient tolerated well with no issues. Vitals stable. Venous blue port rin back blood immediately and flushed easily, red arterial port flushed easily with no blood return noted. Verified with 2 RN's. Dr Michele Briscoe notified. He verbalized to order \"2mg/50ml TPA over 3 hours in red arterial catheter\". Order placed per this RN. 1335- Second TPA infusion started in red arterial line. Patient offered snack and drink. Denies further needs. 1500- IV infusing. Patient provided with drink and snack. 1638- Infusion complete. Patient tolerated well. Arterial line with brisk blood return and flushed without issue. Dr Michele Briscoe and IR notified. 1650- Discharge instructions reviewed with patient and wife. Verbalized understanding with no further questions. AVS provided. Discharged via wheelchair with wife.

## 2023-02-03 NOTE — DISCHARGE INSTRUCTIONS
ACTIVITY:  Continue usual care with your doctor. Call your doctor immediately if any severe problems or go to the nearest emergency room. I have been treated and hereby acknowledge receiving this instruction sheet. RECEIVED BEDSIDE REPORT FROM MIRTA SUN RN FOR CONTINUITY OF CARE. PT SUPINE IN THE BED, 
[id number], [place] SPEAKING. ON 2L NC. SR ON BEDSIDE MONITOR. DYLAN PICC IN PLACE INFUSING NS TKO 
AT 5 ML/H. R IJ TUNNELED DIALYSIS CATHETER IN PLACE. HD SCHEDULED FOR TODAY. BOWEL SOUNDS 
ACTIVE, F/C IN PLACE, OLIGURIC. MODERATE WEAKNESS BUE BLE. L HEEL PRESSURE WOUND, SACRAL 
PRESSURE WOUND, SEE WOUND ASSESSMENT. CALL LIGHT WITHIN REACH. STANDARD ISOLATION. SAFETY 
PRECAUTIONS MET. INITIAL ASSESSMENT COMPLETE, WILL CONTINUE TO CLOSELY MONITOR.

## 2023-02-03 NOTE — OP NOTE
Department of Radiology  Post Procedure Progress Note      Pre-Procedure Diagnosis:  Malfunctioning dialysis cath     Procedure Performed:  cath check     Anesthesia: local     Findings: successful, fibrin sheaths, will run TPA     Immediate Complications:  None    Estimated Blood Loss: minimal    SEE DICTATED PROCEDURE NOTE FOR COMPLETE DETAILS.     Sherly Franklin MD   2/3/2023 9:58 AM

## 2023-02-03 NOTE — PROGRESS NOTES
0716 Pt in specials radiology for dialysis catheter de-clot. Explained procedure to pt and pt verbalizes understanding. 0966 Pt positioned supine on table. 1116 Dialysis catheter venogram complete. TPA ordered. Report called to Linda LOPEZ.  2101 Dialysis catheter dressing changed with sterile technique per OG Ahmadi RT. No redness or drainage at site. 1005 Pt transferred to OPN per cart.

## 2023-02-03 NOTE — H&P
Formulation and discussion of sedation / procedure plans, risks, benefits, side effects and alternatives with patient and/or responsible adult completed. History and Physical reviewed and unchanged.     Electronically signed by Mayuri Bauer MD on 2/3/23 at 9:58 AM EST

## 2023-02-06 ENCOUNTER — OFFICE VISIT (OUTPATIENT)
Dept: CARDIOTHORACIC SURGERY | Age: 63
End: 2023-02-06

## 2023-02-06 VITALS
DIASTOLIC BLOOD PRESSURE: 84 MMHG | HEIGHT: 73 IN | HEART RATE: 70 BPM | WEIGHT: 308 LBS | SYSTOLIC BLOOD PRESSURE: 156 MMHG | BODY MASS INDEX: 40.82 KG/M2

## 2023-02-06 DIAGNOSIS — N18.6 END STAGE RENAL DISEASE (HCC): Primary | ICD-10-CM

## 2023-02-06 PROCEDURE — 99024 POSTOP FOLLOW-UP VISIT: CPT | Performed by: THORACIC SURGERY (CARDIOTHORACIC VASCULAR SURGERY)

## 2023-02-06 NOTE — PATIENT INSTRUCTIONS
Please call our office to schedule follow up. [phone]    If you receive a survey asking about your care experience, please respond. Your answers will help ensure you receive high-quality care at this office. Thank you!

## 2023-02-06 NOTE — PROGRESS NOTES
[address] [facility] SURGERY  [address] [place] Leung [address] [facility] [address]  Dept: [phone]  Dept Fax: [phone]: [phone]    Visit Date: 2/6/2023    Mr. Diane Morales is a 63 y.o.male  who presented for:  Chief Complaint   Patient presents with    Follow-up     Fistula 12/27/23  3 week follow up       HPI:   HPI     Mr. Diane Morales is now 6 weeks s/p left brachio-cephalic AVF creation. Doing well. No issues. Strong thrill noted. Current Outpatient Medications:     Methoxy PEG-Epoetin Beta (MIRCERA IJ), Inject 200 mcg into the skin every 14 days, Disp: , Rfl:     mometasone-formoterol (DULERA) 100-5 MCG/ACT inhaler, Inhale 2 puffs into the lungs in the morning and at bedtime, Disp: , Rfl:     montelukast (SINGULAIR) 10 MG tablet, Take 1 tablet by mouth daily, Disp: , Rfl:     naloxone 4 MG/0.1ML LIQD nasal spray, 1 spray by Nasal route as needed, Disp: , Rfl:     carvedilol (COREG) 25 MG tablet, Take 25 mg by mouth as needed As need for High BP, Disp: , Rfl:     oxyCODONE-acetaminophen (PERCOCET)  MG per tablet, Take 1 tablet by mouth every 8 hours as needed for Pain for up to 30 days. Intended supply: 30 days, Disp: 90 tablet, Rfl: 0    gabapentin (NEURONTIN) 300 MG capsule, Take 1 capsule by mouth daily for 30 days. , Disp: 30 capsule, Rfl: 0    VITAMIN D PO, Take 1 tablet by mouth daily Pt states doesn't state it on bottle how much, Disp: , Rfl:     rOPINIRole (REQUIP) 0.5 MG tablet, take 1 tablet by mouth IN THE AFTERNOON AND 2 TABLETS AT NIGHT., Disp: 90 tablet, Rfl: 5    sevelamer (RENVELA) 800 MG tablet, Take 1 tablet by mouth 3 times daily, Disp: , Rfl:     doxepin (SINEQUAN) 25 MG capsule, take 2 capsules by mouth at bedtime (Patient taking differently: Take 25 mg by mouth nightly), Disp: 60 capsule, Rfl: 3    CPAP Machine MISC, by Does not apply route Please change bipap to 18/14 cm H20., Disp: 1 each, Rfl: 0 levothyroxine (SYNTHROID) 175 MCG tablet, Take 1 tablet by mouth Daily, Disp: 30 tablet, Rfl: 3    ipratropium-albuterol (DUONEB) 0.5-2.5 (3) MG/3ML SOLN nebulizer solution, Inhale 3 mLs into the lungs every 4 hours as needed for Shortness of Breath, Disp: 360 mL, Rfl: 1    albuterol sulfate  (90 Base) MCG/ACT inhaler, Inhale 2 puffs into the lungs every 6 hours as needed for Wheezing or Shortness of Breath, Disp: 1 each, Rfl: 5    aspirin EC 81 MG EC tablet, Take 1 tablet by mouth daily, Disp: 90 tablet, Rfl: 1    nitroGLYCERIN (NITROSTAT) 0.4 MG SL tablet, Place 1 tablet under the tongue every 5 minutes as needed for Chest pain (up to 3 doses), Disp: 25 tablet, Rfl: 3    azelastine (ASTELIN) 137 MCG/SPRAY nasal spray, 1 spray by Nasal route as needed. Use in each nostril as directed, Disp: , Rfl:     carvedilol (COREG) 3.125 MG tablet, Take 1 tablet by mouth 2 times daily, Disp: 60 tablet, Rfl: 2    melatonin 3 MG TABS tablet, Take 1.5 tablets by mouth nightly as needed (Sleep), Disp: 45 tablet, Rfl: 3    Allergies   Allergen Reactions    Azithromycin Itching     Hands swell and blister      Shellfish-Derived Products Swelling     Tolerates IV dye without any problems      Pcn [Penicillins] Itching    Succinylcholine      Pseudocholinesterase Deficiency    Sulfa Antibiotics Itching    Morphine Nausea And Vomiting     \"head swimming, skin crawling\"    Tape Trinity Carrel Tape] Rash       Past Medical History  Gail Sims  has a past medical history of Arthritis, Back problem, Bladder disease, CHF with unknown LVEF ([address] Chronic kidney disease, Constipation, Diabetes mellitus ([address] Hypertension, Hypertensive emergency, Hypertensive urgency, Sleep apnea, and Thyroid disease. Social History  Gail Sims  reports that he has never smoked. His smokeless tobacco use includes chew. He reports that he does not drink alcohol and does not use drugs.     Family History  Gail Sims family history includes Arthritis in his brother; Cancer in his father and mother; Diabetes in his paternal grandmother; Heart Attack in his brother; Heart Disease in his father; No Known Problems in his brother and brother; Prostate Cancer in his brother; Stroke in his brother. There is no family history of bicuspid aortic valve, aneurysms, heart transplant, pacemakers, defibrillators, or sudden cardiac death. Past Surgical History   Past Surgical History:   Procedure Laterality Date    ABDOMEN SURGERY      ABDOMEN SURGERY N/A 3/25/2022    ABDOMINAL LYSIS OF ADHESIONS, EXPLANT OF INFECTED MESH performed by Cinthya Rea MD at [address]      no stents    CARPAL TUNNEL RELEASE Bilateral     COLONOSCOPY  2017    Dr. Ashley Martinez Left 12/27/2022    LEFT ARM ARM ARTERIO-VENOUS FISTULA CREATION performed by Rubin Gaspar MD at [address] [facility]      x3 surgeries with 14 repairs    KNEE SURGERY Right 1980's    cartilage    [place] EXPLORATORY LAPAROTOMY CELIOTOMY W/WO BIOPSY SPX N/A 2/5/2018    ABDOMINAL EXPLORATION WITH LYSIS OF COMPLICATED ADHESIONS WITH AN EXTENDED RIGHT COLON RESECTION performed by Cinthya Rea MD at [address] [facility] [address]:     Review of Systems    Negative except for HPI  Objective:     BP (!) 156/84 (Site: Right Upper Arm, Position: Sitting, Cuff Size: Medium Adult)   Pulse 70   Ht 6' 1\" (1.854 m)   Wt (!) 308 lb (139.7 kg)   BMI 40.64 kg/m²     Wt Readings from Last 3 Encounters:   02/06/23 (!) 308 lb (139.7 kg)   01/16/23 (!) 305 lb 9.6 oz (138.6 kg)   01/10/23 (!) 304 lb (137.9 kg)     BP Readings from Last 3 Encounters:   02/06/23 (!) 156/84   02/03/23 139/67   01/24/23 (!) 147/70       Physical Exam    Left BC AVF incision healed  ++thrill  2+ radial artery pulse  Right IJ permcath clean/ no erythema or discharge.   Lab Results   Component Value Date/Time    WBC 4.6 08/17/2022 05:32 AM    RBC 2.87 08/17/2022 05:32 AM RBC 5.15 08/12/2011 08:29 PM    HGB 7.5 08/17/2022 05:32 AM    HCT 24.6 08/17/2022 05:32 AM    MCV 85.7 08/17/2022 05:32 AM    MCH 26.1 08/17/2022 05:32 AM    MCHC 30.5 08/17/2022 05:32 AM    RDW 17.8 08/01/2022 10:13 AM     08/17/2022 05:32 AM    MPV 8.5 08/17/2022 05:32 AM       Lab Results   Component Value Date/Time     12/27/2022 08:57 AM    K 3.9 12/27/2022 08:57 AM     12/27/2022 08:57 AM    CO2 24 12/27/2022 08:57 AM    BUN 53 12/27/2022 08:57 AM    LABALBU 3.7 08/08/2022 07:09 PM    CREATININE 8.1 12/27/2022 08:57 AM    CALCIUM 8.3 12/27/2022 08:57 AM    LABGLOM 7 12/27/2022 08:57 AM    GLUCOSE 102 12/27/2022 08:57 AM       Lab Results   Component Value Date/Time    ALKPHOS 91 08/08/2022 07:09 PM    ALT 9 08/08/2022 07:09 PM    AST 9 08/08/2022 07:09 PM    PROT 8.4 08/08/2022 07:09 PM    BILITOT 0.3 08/08/2022 07:09 PM    BILIDIR <0.2 04/20/2022 12:50 PM    LABALBU 3.7 08/08/2022 07:09 PM       Lab Results   Component Value Date/Time    MG 2.5 08/09/2022 06:26 AM       Lab Results   Component Value Date    INR 1.14 (H) 06/16/2021    INR 1.06 12/01/2020    INR 1.03 07/02/2019         Lab Results   Component Value Date/Time    LABA1C 4.8 01/17/2023 01:48 PM       Lab Results   Component Value Date/Time    TRIG 107 05/25/2021 03:40 AM    HDL 35 05/25/2021 03:40 AM    LDLCALC 96 05/25/2021 03:40 AM       Lab Results   Component Value Date/Time    TSH 4.660 01/17/2023 01:48 PM         T  Assessment/Plan     1. End stage renal disease [facility])      Patient may use the left arm AVF immediately. Patient to call [facility]ascular when the AVF has been successfully used by staff and by patient for 2 weeks continuously so as to remove the permacath. No follow-ups on file.       Electronically signed by Jacqui Becerra MD   2/6/2023 at 8:39 AM EST

## 2023-02-07 ENCOUNTER — HOSPITAL ENCOUNTER (EMERGENCY)
Age: 63
Discharge: HOME | End: 2023-02-08
Attending: EMERGENCY MEDICINE
Payer: MEDICARE

## 2023-02-07 VITALS
HEIGHT: 73 IN | HEART RATE: 79 BPM | OXYGEN SATURATION: 95 % | DIASTOLIC BLOOD PRESSURE: 79 MMHG | BODY MASS INDEX: 40.82 KG/M2 | TEMPERATURE: 97.5 F | WEIGHT: 308 LBS | SYSTOLIC BLOOD PRESSURE: 140 MMHG | RESPIRATION RATE: 18 BRPM

## 2023-02-07 DIAGNOSIS — T82.9XXA COMPLICATION ASSOCIATED WITH DIALYSIS CATHETER: Primary | ICD-10-CM

## 2023-02-07 PROCEDURE — 99282 EMERGENCY DEPT VISIT SF MDM: CPT

## 2023-02-07 ASSESSMENT — PAIN - FUNCTIONAL ASSESSMENT: PAIN_FUNCTIONAL_ASSESSMENT: NONE - DENIES PAIN

## 2023-02-08 DIAGNOSIS — G89.4 CHRONIC PAIN SYNDROME: ICD-10-CM

## 2023-02-08 RX ORDER — GABAPENTIN 300 MG/1
300 CAPSULE ORAL DAILY
Qty: 30 CAPSULE | Refills: 0 | Status: SHIPPED | OUTPATIENT
Start: 2023-02-10 | End: 2023-03-12

## 2023-02-08 NOTE — ED PROVIDER NOTES
[address] [facility] [address] [address] EMERGENCY DEPT      EMERGENCY MEDICINE     Pt Name: Pranav Khan  MRN: [id number]  [place] 1960  Date of evaluation: 2/7/2023  Resident Physician: Dionisio Faith DPM  Supervising Physician: Trung Jenkins MD    CHIEF COMPLAINT       Chief Complaint   Patient presents with    Other     Dialysis catheter problem      HISTORY OF PRESENT ILLNESS   Pranav Khan is a pleasant 63 y.o. male who presents to the emergency department from from home, as a walk in to the ED lobby for evaluation of dialysis catheter falling off. Patient was seen yesterday by Dr. Anne Ross and was noted that he was doing well with left brachiocephalic AVF creation. Patient states that they are not ready to use that and are continuously to use the temporary catheter placement for dialysis. They state that yesterday tPA was administered and the technician broke off one of the tips. On examination of the catheter is apparent that the rubberized coupling has come off and the Luer-Mayda connectors for the tip were disengaged. PASTMEDICAL HISTORY     Past Medical History:   Diagnosis Date    Arthritis     Back problem     back pain-sees [facility] pain mgmt    Bladder disease     Dr. Kaylee Rascon    CHF with unknown LVEF ([facility] 05/24/2021    Dr. George/CHF clinic    Chronic kidney disease     Constipation     Diabetes mellitus ([facility]     Dr. Jonathan Blackburn    Hypertension     Hypertensive emergency 04/11/2019    Hypertensive urgency 04/13/2019    Sleep apnea     has bipap-sees AZRA Olmedo CNP    Thyroid disease        Patient Active Problem List   Diagnosis Code    Allergy to bee sting Z91.030    Obesity, Class III, BMI 40-49.9 (morbid obesity) ([facility] E66.01    Weight gain R63.5    Primary hypertension I10    Rheumatoid arteritis (HCC) M05.20    Hypothyroidism E03.9    Gastroenteritis K52.9    Obstructive sleep apnea G47.33    Small intestinal bacterial overgrowth K63.89    Acute diarrhea R19.7    Generalized abdominal pain R10.84    Nausea and vomiting R11.2    Hepatomegaly R16.0    Ileus (HCC) K56.7    Hypokalemia E87.6    S/P partial resection of colon Z90.49    S/P laparotomy Z98.890    Acute renal failure superimposed on stage 5 chronic kidney disease, not on chronic dialysis (HCC) N17.9, N18.5    Hypernatremia E87.0    Serum potassium decreased E87.6    Other headache syndrome G44.89    RLS (restless legs syndrome) G25.81    Psychophysiological insomnia F51.04    Angina, class III (HCC) I20.9    Acute respiratory failure with hypoxia (HCC) J96.01    Dyspnea R06.00    CHF (NYHA class III, ACC/AHA stage C) (HCC) I50.9    Nonhealing surgical wound T81.89XA    Diabetes mellitus (HCC) E11.9    CAROLE (acute kidney injury) ([facility] N17.9    Acute hypoxemic respiratory failure (HCC) J96.01    Acute on chronic congestive heart failure (HCC) I50.9    Stage 3a chronic kidney disease (HCC) N18.31    Anemia of chronic renal failure N18.9, D63.1    CHF (congestive heart failure), NYHA class III, acute on chronic, combined (HCC) I50.43    CHF (congestive heart failure), NYHA class II, chronic, diastolic (HCC) B12.59    Chronic kidney disease, stage IV (severe) (HCC) N18.4    Hyperkalemia E87.5    ATN (acute tubular necrosis) (HCC) N17.0    Stage 4 chronic kidney disease (HCC) N18.4     SURGICAL HISTORY       Past Surgical History:   Procedure Laterality Date    ABDOMEN SURGERY      ABDOMEN SURGERY N/A 3/25/2022    ABDOMINAL LYSIS OF ADHESIONS, EXPLANT OF INFECTED MESH performed by Estefany Head MD at [address]      no stents    CARPAL TUNNEL RELEASE Bilateral     COLONOSCOPY  2017    Dr. Markus Duarte Left 12/27/2022    LEFT ARM ARM ARTERIO-VENOUS FISTULA CREATION performed by Derrick Arora MD at [address] [facility] [address] [place] [address] Rd      x3 surgeries with 14 repairs    KNEE SURGERY Right 1980's    cartilage    NM EXPLORATORY LAPAROTOMY CELIOTOMY W/WO BIOPSY SPX N/A 2/5/2018 ABDOMINAL EXPLORATION WITH LYSIS OF COMPLICATED ADHESIONS WITH AN EXTENDED RIGHT COLON RESECTION performed by Brad Sorenson MD at [facility]       Current Discharge Medication List        CONTINUE these medications which have NOT CHANGED    Details   Methoxy PEG-Epoetin Beta (MIRCERA IJ) Inject 200 mcg into the skin every 14 days      mometasone-formoterol (DULERA) 100-5 MCG/ACT inhaler Inhale 2 puffs into the lungs in the morning and at bedtime      montelukast (SINGULAIR) 10 MG tablet Take 1 tablet by mouth daily      naloxone 4 MG/0.1ML LIQD nasal spray 1 spray by Nasal route as needed      carvedilol (COREG) 25 MG tablet Take 25 mg by mouth as needed As need for High BP      oxyCODONE-acetaminophen (PERCOCET)  MG per tablet Take 1 tablet by mouth every 8 hours as needed for Pain for up to 30 days. Intended supply: 30 days  Qty: 90 tablet, Refills: 0    Comments: Reduce doses taken as pain becomes manageable  Associated Diagnoses: Chronic pain syndrome      gabapentin (NEURONTIN) 300 MG capsule Take 1 capsule by mouth daily for 30 days. Qty: 30 capsule, Refills: 0    Associated Diagnoses: Chronic pain syndrome      VITAMIN D PO Take 1 tablet by mouth daily Pt states doesn't state it on bottle how much      rOPINIRole (REQUIP) 0.5 MG tablet take 1 tablet by mouth IN THE AFTERNOON AND 2 TABLETS AT NIGHT. Qty: 90 tablet, Refills: 5      sevelamer (RENVELA) 800 MG tablet Take 1 tablet by mouth 3 times daily      doxepin (SINEQUAN) 25 MG capsule take 2 capsules by mouth at bedtime  Qty: 60 capsule, Refills: 3      CPAP Machine MISC by Does not apply route Please change bipap to 18/14 cm H20.   Qty: 1 each, Refills: 0      levothyroxine (SYNTHROID) 175 MCG tablet Take 1 tablet by mouth Daily  Qty: 30 tablet, Refills: 3      melatonin 3 MG TABS tablet Take 1.5 tablets by mouth nightly as needed (Sleep)  Qty: 45 tablet, Refills: 3      ipratropium-albuterol (DUONEB) 0.5-2.5 (3) MG/3ML SOLN nebulizer solution Inhale 3 mLs into the lungs every 4 hours as needed for Shortness of Breath  Qty: 360 mL, Refills: 1      albuterol sulfate  (90 Base) MCG/ACT inhaler Inhale 2 puffs into the lungs every 6 hours as needed for Wheezing or Shortness of Breath  Qty: 1 each, Refills: 5    Associated Diagnoses: Mild intermittent asthma without complication      aspirin EC 81 MG EC tablet Take 1 tablet by mouth daily  Qty: 90 tablet, Refills: 1      nitroGLYCERIN (NITROSTAT) 0.4 MG SL tablet Place 1 tablet under the tongue every 5 minutes as needed for Chest pain (up to 3 doses)  Qty: 25 tablet, Refills: 3      azelastine (ASTELIN) 137 MCG/SPRAY nasal spray 1 spray by Nasal route as needed. Use in each nostril as directed             ALLERGIES     is allergic to azithromycin, shellfish-derived products, pcn [penicillins], succinylcholine, sulfa antibiotics, morphine, and tape [adhesive tape]. FAMILY HISTORY     He indicated that his mother is . He indicated that his father is . He indicated that all of his four brothers are alive. He indicated that the status of his paternal grandmother is unknown. He indicated that the status of his neg hx is unknown. SOCIAL HISTORY       Social History     Tobacco Use    Smoking status: Never    Smokeless tobacco: Current     Types: Chew    Tobacco comments:     quit pipe over 30 yrs ago   Vaping Use    Vaping Use: Never used   Substance Use Topics    Alcohol use: No     Comment: quit 2017    Drug use: No       PHYSICAL EXAM       ED Triage Vitals [23]   BP Temp Temp Source Heart Rate Resp SpO2 Height Weight   (!) 140/79 97.5 °F (36.4 °C) Oral 79 18 95 % 6' 1\" (1.854 m) (!) 308 lb (139.7 kg)       Additional Vital Signs:  Vitals:    23   BP: (!) 140/79   Pulse: 79   Resp: 18   Temp: 97.5 °F (36.4 °C)   SpO2: 95%     Physical Exam  Vitals and nursing note reviewed. Constitutional:       General: He is not in acute distress. Appearance: Normal appearance. Eyes:      Extraocular Movements: Extraocular movements intact. Pulmonary:      Effort: Pulmonary effort is normal.   Skin:     General: Skin is warm and dry. Neurological:      Mental Status: He is alert and oriented to person, place, and time. FORMAL DIAGNOSTIC RESULTS     RADIOLOGY: Interpretation per the Radiologist below, if available at the time of this note (none if blank): No orders to display       LABS: (none if blank)  Labs Reviewed - No data to display    (Any cultures that may have been sent were not resulted at the time of this patient visit)    [address] / ED COURSE:     1) Number and Complexity of Problems            Problem List This Visit:         Chief Complaint   Patient presents with    Other     Dialysis catheter problem             Differential Diagnosis includes (but not limited to):  Broken dialysis catheter attachment        Diagnoses Considered but I have low suspicion of:                Pertinent Comorbid Conditions:    CKD, on HD    2)  Data Reviewed (none if left blank)          My Independent interpretations:     EKG:          Imaging:     Labs:                       Decision Rules/Clinical Scores utilized:              External Documentation Reviewed:         Previous patient encounter documents & history available on EMR was reviewed. See Formal Diagnostic Results above for the lab and radiology tests and orders. 3)  Treatment and Disposition         ED Reassessment: Patient reassessed alongside Dr. Jeramie Claudio. It was found that one of the Luer lock mechanisms was broken completely off. Explained to patient that we will obtain another dialysis catheter and replaced the part.          Case discussed with consulting clinician:           Shared Decision-Making was performed and disposition discussed with the        Patient/Family and questions answered          Social determinants of health impacting treatment or disposition:           Code Status:  Full      Summary of Patient Presentation:        MDM  Number of Diagnoses or Management Options  Complication associated with dialysis catheter: new, no workup  Diagnosis management comments: This 72-year-old male presented to to the emergency department for issues with his dialysis catheter tip. During prior use a technician broke off one of the tip and replaced it with a temporary device. The device fell off this evening. Patient was unsure of how to replace it or fax in Emergency department. New dialysis catheter was obtained and the tip was replaced. Patient administered heparin himself and the device was seen to be working. Patient discharged. /  ED Course as of 02/07/23 2358   Tue Feb 07, 2023 2251 Evaluated catheter tip. Will obtain dialysis catheter and replace locking  mechanism. [AA]      ED Course User Index  [AA] Juan José Wallis DPM     Vitals Reviewed:    Vitals:    02/07/23 2119   BP: (!) 140/79   Pulse: 79   Resp: 18   Temp: 97.5 °F (36.4 °C)   TempSrc: Oral   SpO2: 95%   Weight: (!) 308 lb (139.7 kg)   Height: 6' 1\" (1.854 m)       The patient was seen and examined. Appropriate diagnostic testing was performed and results reviewed with the patient. The results of pertinent diagnostic studies and exam findings were discussed. The patients provisional diagnosis and plan of care were discussed with the patient and present family who expressed understanding. Any medications were reviewed and indications and risks of medications were discussed with the patient /family present. Strict verbal and written return precautions, instructions and appropriate follow-up provided to  the patient .      ED Medications administered this visit:  (None if blank)  Medications - No data to display      PROCEDURES: (None if blank)  Procedures:     CRITICAL CARE: (None if blank)      DISCHARGE PRESCRIPTIONS: (None if blank)  Current Discharge Medication List FINAL IMPRESSION      1.  Complication associated with dialysis catheter          DISPOSITION/PLAN   DISPOSITION Decision To Discharge 02/07/2023 11:50:50 PM        OUTPATIENT FOLLOW UP THE PATIENT:  [address] [facility] [address] [address] EMERGENCY DEPT  [address] [facility] [address] [phone]  Go to   If symptoms worsen    PRASHANTH Damon DPM  Resident  02/07/23 3443

## 2023-02-08 NOTE — ED PROVIDER NOTES
PATIENT NAME: Remberto Steele  MRN: [id number]  : 1960  AKINS: 2023    I performed a history and physical examination of the patient and discussed management with the Resident. I reviewed the Resident's note and agree with the documented findings and plan of care. Any areas of disagreement are noted on the chart. I was personally present for the key portions of any procedures and have documented in the chart those procedures where I was not present during the key portions. I have reviewed the emergency nurses triage note and agree with the chief complaint, past medical history, past surgical history, allergies, medications, social and family history as documented unless otherwise noted below. MEDICAL DEDISION MAKING (MDM)     Remberto Steele is a 63 y.o. male who presents to Emergency Department with Other (Dialysis catheter problem )     Patient presents to ED for evaluation of HD catheter  falling off tonight. No clear clue what exactly happened and how he lost it. No other complaints. In the ED, we were able to find a red tip of same dialysis catheter and redcap was reinstalled. Patient is discharged in stable conditions. Vitals:    23 2119   BP: (!) 140/79   Pulse: 79   Resp: 18   Temp: 97.5 °F (36.4 °C)   TempSrc: Oral   SpO2: 95%   Weight: (!) 308 lb (139.7 kg)   Height: 6' 1\" (1.854 m)     Medications - No data to display  Labs Reviewed - No data to display  No orders to display       FINAL IMPRESSION AND DISPOSITION      1.  Complication associated with dialysis catheter        DISPOSITION Decision To Discharge 2023 11:50:50 PM      PATIENT REFERRED TO:  [address] [facility] [address] [address] EMERGENCY DEPT  [address]  Go to   If symptoms worsen      DISCHARGE MEDICATIONS:  Discharge Medication List as of 2023 11:55 PM          (Please note that portions of this note were completed with a voice recognition program.  Efforts were made to edit the dictations but occasionally words aremis-transcribed.)    MD Jazzy Ross MD  02/08/23 [id number]

## 2023-02-08 NOTE — ED TRIAGE NOTES
Pt presents to the ED from home with complaints of having the tip of his dialysis catheter fell off and needs replaced.

## 2023-02-10 ENCOUNTER — OFFICE VISIT (OUTPATIENT)
Dept: PULMONOLOGY | Age: 63
End: 2023-02-10
Payer: MEDICARE

## 2023-02-10 VITALS
SYSTOLIC BLOOD PRESSURE: 132 MMHG | WEIGHT: 305.6 LBS | OXYGEN SATURATION: 96 % | HEART RATE: 92 BPM | HEIGHT: 73 IN | BODY MASS INDEX: 40.5 KG/M2 | TEMPERATURE: 96.8 F | DIASTOLIC BLOOD PRESSURE: 68 MMHG

## 2023-02-10 DIAGNOSIS — G47.33 OSA (OBSTRUCTIVE SLEEP APNEA): Primary | ICD-10-CM

## 2023-02-10 DIAGNOSIS — G25.81 RLS (RESTLESS LEGS SYNDROME): ICD-10-CM

## 2023-02-10 DIAGNOSIS — E66.01 MORBID OBESITY WITH BMI OF 40.0-44.9, ADULT (HCC): ICD-10-CM

## 2023-02-10 DIAGNOSIS — I50.32 CHRONIC DIASTOLIC HEART FAILURE (HCC): ICD-10-CM

## 2023-02-10 DIAGNOSIS — G47.09 OTHER INSOMNIA: ICD-10-CM

## 2023-02-10 PROCEDURE — 3078F DIAST BP <80 MM HG: CPT

## 2023-02-10 PROCEDURE — G8482 FLU IMMUNIZE ORDER/ADMIN: HCPCS

## 2023-02-10 PROCEDURE — 3075F SYST BP GE 130 - 139MM HG: CPT

## 2023-02-10 PROCEDURE — G8427 DOCREV CUR MEDS BY ELIG CLIN: HCPCS

## 2023-02-10 PROCEDURE — 3017F COLORECTAL CA SCREEN DOC REV: CPT

## 2023-02-10 PROCEDURE — 99214 OFFICE O/P EST MOD 30 MIN: CPT

## 2023-02-10 PROCEDURE — 4004F PT TOBACCO SCREEN RCVD TLK: CPT

## 2023-02-10 PROCEDURE — G8417 CALC BMI ABV UP PARAM F/U: HCPCS

## 2023-02-10 RX ORDER — DOXEPIN HYDROCHLORIDE 25 MG/1
25 CAPSULE ORAL NIGHTLY
Qty: 30 CAPSULE | Refills: 3 | Status: SHIPPED | OUTPATIENT
Start: 2023-02-10

## 2023-02-10 ASSESSMENT — ENCOUNTER SYMPTOMS
CONSTIPATION: 1
SINUS PRESSURE: 0
SHORTNESS OF BREATH: 1
WHEEZING: 0
SINUS PAIN: 0
COUGH: 0
DIARRHEA: 1
RHINORRHEA: 1

## 2023-02-10 NOTE — PROGRESS NOTES
[facility]      Chantel Pérez         [id number]  2/10/2023   Chief Complaint   Patient presents with    Follow-up     3 mo deisy f/u        Pt of damaris CANTU Download:   Original or initial AHI: 49     Date of initial study: 9.12.15      Compliant  70%     Noncompliant 17 %     PAP Type spont Level  18/14   Avg Hrs/Day 6hr 14min  AHI: 1.3   Recorded compliance dates , 1.10.23-2.8.23     Machine/Mfg:   [x] ResMed    [] Respironics/Dreamstation   Interface:   [] Nasal    [] Nasal pillows   [x] FFM      Provider:      [x] [facility]     [][employer]     [] [employer]    [] [address]    [] [place]               [] [facility] [facility]      [] [employer]    [] [address]      [] Other    Neck Size: 20.75  Mallampati 4  ESS:  15  SAQLI: 58    Here is a scan of the most recent download:                Presentation:   Tacos Montes De Oca presents for sleep medicine follow up for obstructive sleep apnea  Since the last visit, Tacos Montes De Oca reports he has been tolerating his BiPAP better and states he is doing better with wearing his BiPAP. He is still sleeping in his recliner. He has some trouble with mask seal due to his mask wearing out. He reports that he sometimes battles with his mask and he sometimes will give up and put his oxygen on. He attributes a lot of his sleep issues to his heart failure and kidney disease. He is now on hemodialysis. Patient reports that his elavil has been stopped. He is currently taking doxepin and melatonin and reports that this regimen is working well. He is taking requip for RLS and reports good benefit on most nights. Weight lost 23 lbs over 9 months    Equipment issues: The pressure is  acceptable, the mask is unacceptable - he reports that his mask is worn out    Sleep issues:  Do you feel better? some  More rested? some   Better concentration? no  Difficulty falling sleep? Yes  Difficulty staying asleep? Yes  Snoring?   No    Progress History:   Since last visit any new medical issues? Yes hemodialysis in August - fistula placed - currently doing home dialysis  Any new or changes in medicines? Yes elavil discontinued and melatonin started  Any new sleep medicines? Yes see above    Review of Systems -   Review of Systems   Constitutional:  Negative for appetite change, fever and unexpected weight change. HENT:  Positive for rhinorrhea. Negative for congestion, postnasal drip, sinus pressure, sinus pain and sneezing. Respiratory:  Positive for shortness of breath (with activity). Negative for cough and wheezing. Cardiovascular:  Positive for leg swelling. Negative for chest pain and palpitations. Gastrointestinal:  Positive for constipation and diarrhea. Allergic/Immunologic: Negative for environmental allergies. Physical Exam:    BMI:  Body mass index is 40.32 kg/m². Wt Readings from Last 3 Encounters:   02/10/23 (!) 305 lb 9.6 oz (138.6 kg)   02/07/23 (!) 308 lb (139.7 kg)   02/06/23 (!) 308 lb (139.7 kg)     Vitals: /68 (Site: Right Upper Arm, Position: Sitting, Cuff Size: Large Adult)   Pulse 92   Temp 96.8 °F (36 °C) (Infrared)   Ht 6' 1\" (1.854 m)   Wt (!) 305 lb 9.6 oz (138.6 kg)   SpO2 96%   BMI 40.32 kg/m²       Physical Exam  Nursing note reviewed. Constitutional:       General: He is not in acute distress. Appearance: He is obese. Comments: BMI 40.32   HENT:      Head: Normocephalic and atraumatic. Right Ear: External ear normal.      Left Ear: External ear normal.      Mouth/Throat:      Mouth: Mucous membranes are moist.      Pharynx: No oropharyngeal exudate or posterior oropharyngeal erythema. Eyes:      General:         Right eye: No discharge. Left eye: No discharge. Cardiovascular:      Rate and Rhythm: Normal rate. Pulmonary:      Effort: Pulmonary effort is normal. No respiratory distress. Breath sounds: Wheezing (right upper lobe posterior - clears with cough) present. No rhonchi or rales. Musculoskeletal:      Cervical back: Neck supple. Right lower leg: No edema. Left lower leg: No edema. Skin:     General: Skin is warm and dry. Neurological:      General: No focal deficit present. Mental Status: He is alert. Psychiatric:         Mood and Affect: Mood normal.         Behavior: Behavior normal.         Thought Content: Thought content normal.         ASSESSMENT/DIAGNOSIS     Diagnosis Orders   1. LAURIE (obstructive sleep apnea)  DME Order for CPAP as OP      2. RLS (restless legs syndrome)        3. Chronic diastolic heart failure ([address]        4. Morbid obesity with BMI of 40.0-44.9, adult ([facility])        5. Other insomnia  doxepin (SINEQUAN) 25 MG capsule               Plan   Do you need any equipment today? Yes yearly supply order placed  -Patient's symptoms and AHI are fairly well controlled with current settings of IPAP 18  cmH2O and EPAP 14 cmH2O. Continue current pressure settings.  -Patient reports good benefit with doxepin and melatonin. Will continue off of elavil for now. Continue doxepin and melatonin.    -Advised better compliance with BiPAP  -Patient reports good benefit with Requip. Continue at current dose. -Advised to continue current positive airway pressure therapy with above described pressure. -Advised to keep good compliance with current recommended pressure to get optimal results and clinical improvement  -Recommend 7-9 hours of sleep with PAP  -Instructed to call DME company regarding supplies if needed.   -Patient to call office for earlier appointment if needed for worsening of sleep symptoms.   -Encouraged weight loss  -Advised close follow up with cardiology and nephrology  -Educated about my impression and plan. Patient verbalizes understanding. Will see Sola Merida back in: 6 months with download. Information added by my medical assistant/LPN was reviewed today.     Electronically signed by SAHIL Grant CNP on 2/10/2023 at 1:14 PM

## 2023-02-17 DIAGNOSIS — G89.4 CHRONIC PAIN SYNDROME: ICD-10-CM

## 2023-02-17 RX ORDER — OXYCODONE AND ACETAMINOPHEN 10; 325 MG/1; MG/1
1 TABLET ORAL EVERY 8 HOURS PRN
Qty: 90 TABLET | Refills: 0 | Status: SHIPPED | OUTPATIENT
Start: 2023-02-17 | End: 2023-03-19

## 2023-02-17 NOTE — PROGRESS NOTES
OARRS reviewed. UDS: + for  Gabapentin, Oxycodone, Amitriptyline, Doxepin  Last seen: Visit date not found.  Follow-up:   Future Appointments   Date Time Provider Ismael Samina   2/20/2023 12:00 PM SAHIL Suggs [facility]  [place]   4/13/2023 12:45 PM Rosa Hughes MD N [address]   4/27/2023  3:00 PM MD RANGEL Healy [facility]  [place]   8/11/2023 12:00 PM SAHIL [place]

## 2023-02-21 ENCOUNTER — HOSPITAL ENCOUNTER (OUTPATIENT)
Age: 63
Discharge: HOME | End: 2023-02-21
Payer: MEDICARE

## 2023-02-21 DIAGNOSIS — N47.1 PHIMOSIS: ICD-10-CM

## 2023-02-21 DIAGNOSIS — Z01.818 PRE-OP TESTING: ICD-10-CM

## 2023-02-21 DIAGNOSIS — N40.1 BENIGN PROSTATIC HYPERPLASIA WITH URINARY FREQUENCY: ICD-10-CM

## 2023-02-21 DIAGNOSIS — R35.0 BENIGN PROSTATIC HYPERPLASIA WITH URINARY FREQUENCY: ICD-10-CM

## 2023-02-21 LAB
ANION GAP SERPL CALC-SCNC: 18 MEQ/L (ref 8–16)
ANISOCYTOSIS BLD QL SMEAR: PRESENT
BASOPHILS ABSOLUTE: 0.1 THOU/MM3 (ref 0–0.1)
BASOPHILS NFR BLD AUTO: 1 %
BUN SERPL-MCNC: 58 MG/DL (ref 7–22)
CALCIUM SERPL-MCNC: 10.9 MG/DL (ref 8.5–10.5)
CHLORIDE SERPL-SCNC: 93 MEQ/L (ref 98–111)
CO2 SERPL-SCNC: 27 MEQ/L (ref 23–33)
CREAT SERPL-MCNC: 8.5 MG/DL (ref 0.4–1.2)
DEPRECATED RDW RBC AUTO: 65.6 FL (ref 35–45)
EOSINOPHIL NFR BLD AUTO: 6.7 %
EOSINOPHILS ABSOLUTE: 0.5 THOU/MM3 (ref 0–0.4)
ERYTHROCYTE [DISTWIDTH] IN BLOOD BY AUTOMATED COUNT: 18.4 % (ref 11.5–14.5)
GFR SERPL CREATININE-BSD FRML MDRD: 7 ML/MIN/1.73M2
GLUCOSE SERPL-MCNC: 93 MG/DL (ref 70–108)
HCT VFR BLD AUTO: 28.7 % (ref 42–52)
HGB BLD-MCNC: 9.3 GM/DL (ref 14–18)
IMM GRANULOCYTES # BLD AUTO: 0.04 THOU/MM3 (ref 0–0.07)
IMM GRANULOCYTES NFR BLD AUTO: 0.5 %
LYMPHOCYTES ABSOLUTE: 1 THOU/MM3 (ref 1–4.8)
LYMPHOCYTES NFR BLD AUTO: 13.9 %
MCH RBC QN AUTO: 31.5 PG (ref 26–33)
MCHC RBC AUTO-ENTMCNC: 32.4 GM/DL (ref 32.2–35.5)
MCV RBC AUTO: 97.3 FL (ref 80–94)
MONOCYTES ABSOLUTE: 0.8 THOU/MM3 (ref 0.4–1.3)
MONOCYTES NFR BLD AUTO: 11.4 %
NEUTROPHILS NFR BLD AUTO: 66.5 %
NRBC BLD AUTO-RTO: 0 /100 WBC
PLATELET # BLD AUTO: 256 THOU/MM3 (ref 130–400)
PMV BLD AUTO: 8.8 FL (ref 9.4–12.4)
POTASSIUM SERPL-SCNC: 4.5 MEQ/L (ref 3.5–5.2)
RBC # BLD AUTO: 2.95 MILL/MM3 (ref 4.7–6.1)
SEGMENTED NEUTROPHILS ABSOLUTE COUNT: 4.9 THOU/MM3 (ref 1.8–7.7)
SODIUM SERPL-SCNC: 138 MEQ/L (ref 135–145)
WBC # BLD AUTO: 7.4 THOU/MM3 (ref 4.8–10.8)

## 2023-02-21 PROCEDURE — 85025 COMPLETE CBC W/AUTO DIFF WBC: CPT

## 2023-02-21 PROCEDURE — 87086 URINE CULTURE/COLONY COUNT: CPT

## 2023-02-21 PROCEDURE — 36415 COLL VENOUS BLD VENIPUNCTURE: CPT

## 2023-02-21 PROCEDURE — 80048 BASIC METABOLIC PNL TOTAL CA: CPT

## 2023-02-23 LAB — BACTERIA UR CULT: NORMAL

## 2023-02-23 RX ORDER — SEVELAMER CARBONATE 800 MG/1
1 TABLET, FILM COATED ORAL
COMMUNITY

## 2023-02-23 NOTE — PROGRESS NOTES
Follow all instructions given by your physician  NPO after midnight   Sips of water am of surgery with allowed medications  Bring insurance info and 's license  Wear comfortable clean, loose fitting clothing  No jewelry or contact lenses to be worn day of surgery  No glue on dentures morning of surgery;you will be asked to remove them for surgery. Case for glasses. [address] night before and morning of surgery with a liquid antibacterial soap, dry with fresh clean towel; no lotions, creams or powder. Clean sheets and pillow case on bed night before surgery  Bring medications in original bottles  Bring CPAP/BIPAP machine    Please refer to the SSI-Surgical Site Infection Flyer you hopefully received in the mail-together we can prevent infections; signs and symptoms reviewed. When discharged be sure you understand how to care for your wound and that you have the phone # to call if you have any concerns or questions about your wound.  needed at discharge and someone over 18 to stay with you for 24 hours overnight (surgery may be cancelled if you don't have this)  Report to [facility] [place] on 2nd floor  If you would become ill prior to surgery, please call the surgeon  May have a visitor with you, we request that you limit to 2 visitors in pre-op area  Masks are recommended but not required, new masks at entrance desk  Call [phone] for any questions    Covid questionnaire Complete; Patient negative for symptoms or exposure. See documentation.

## 2023-03-02 ENCOUNTER — ANESTHESIA (OUTPATIENT)
Dept: OPERATING ROOM | Age: 63
End: 2023-03-02
Payer: MEDICARE

## 2023-03-02 ENCOUNTER — ANESTHESIA EVENT (OUTPATIENT)
Dept: OPERATING ROOM | Age: 63
End: 2023-03-02
Payer: MEDICARE

## 2023-03-02 ENCOUNTER — HOSPITAL ENCOUNTER (OUTPATIENT)
Age: 63
Setting detail: OUTPATIENT SURGERY
Discharge: HOME | End: 2023-03-02
Attending: UROLOGY | Admitting: UROLOGY
Payer: MEDICARE

## 2023-03-02 VITALS
BODY MASS INDEX: 39.76 KG/M2 | HEART RATE: 70 BPM | DIASTOLIC BLOOD PRESSURE: 65 MMHG | RESPIRATION RATE: 18 BRPM | TEMPERATURE: 96.7 F | SYSTOLIC BLOOD PRESSURE: 123 MMHG | WEIGHT: 300 LBS | OXYGEN SATURATION: 98 % | HEIGHT: 73 IN

## 2023-03-02 DIAGNOSIS — G89.18 POSTOPERATIVE PAIN: Primary | ICD-10-CM

## 2023-03-02 LAB — POTASSIUM SERPL-SCNC: 3.6 MEQ/L (ref 3.5–5.2)

## 2023-03-02 PROCEDURE — 84132 ASSAY OF SERUM POTASSIUM: CPT

## 2023-03-02 PROCEDURE — 3600000012 HC SURGERY LEVEL 2 ADDTL 15MIN: Performed by: UROLOGY

## 2023-03-02 PROCEDURE — 6360000002 HC RX W HCPCS

## 2023-03-02 PROCEDURE — 2709999900 HC NON-CHARGEABLE SUPPLY: Performed by: UROLOGY

## 2023-03-02 PROCEDURE — 6360000002 HC RX W HCPCS: Performed by: UROLOGY

## 2023-03-02 PROCEDURE — 6370000000 HC RX 637 (ALT 250 FOR IP): Performed by: UROLOGY

## 2023-03-02 PROCEDURE — 6360000002 HC RX W HCPCS: Performed by: ANESTHESIOLOGY

## 2023-03-02 PROCEDURE — 36415 COLL VENOUS BLD VENIPUNCTURE: CPT

## 2023-03-02 PROCEDURE — 2580000003 HC RX 258: Performed by: UROLOGY

## 2023-03-02 PROCEDURE — 7100000000 HC PACU RECOVERY - FIRST 15 MIN: Performed by: UROLOGY

## 2023-03-02 PROCEDURE — 3600000002 HC SURGERY LEVEL 2 BASE: Performed by: UROLOGY

## 2023-03-02 PROCEDURE — 7100000011 HC PHASE II RECOVERY - ADDTL 15 MIN: Performed by: UROLOGY

## 2023-03-02 PROCEDURE — 2500000003 HC RX 250 WO HCPCS

## 2023-03-02 PROCEDURE — 7100000001 HC PACU RECOVERY - ADDTL 15 MIN: Performed by: UROLOGY

## 2023-03-02 PROCEDURE — 3700000000 HC ANESTHESIA ATTENDED CARE: Performed by: UROLOGY

## 2023-03-02 PROCEDURE — 7100000010 HC PHASE II RECOVERY - FIRST 15 MIN: Performed by: UROLOGY

## 2023-03-02 PROCEDURE — 3700000001 HC ADD 15 MINUTES (ANESTHESIA): Performed by: UROLOGY

## 2023-03-02 RX ORDER — PHENYLEPHRINE HYDROCHLORIDE 10 MG/ML
INJECTION INTRAVENOUS PRN
Status: DISCONTINUED | OUTPATIENT
Start: 2023-03-02 | End: 2023-03-02 | Stop reason: SDUPTHER

## 2023-03-02 RX ORDER — ROCURONIUM BROMIDE 10 MG/ML
INJECTION, SOLUTION INTRAVENOUS PRN
Status: DISCONTINUED | OUTPATIENT
Start: 2023-03-02 | End: 2023-03-02 | Stop reason: SDUPTHER

## 2023-03-02 RX ORDER — FENTANYL CITRATE 50 UG/ML
INJECTION, SOLUTION INTRAMUSCULAR; INTRAVENOUS PRN
Status: DISCONTINUED | OUTPATIENT
Start: 2023-03-02 | End: 2023-03-02 | Stop reason: SDUPTHER

## 2023-03-02 RX ORDER — IPRATROPIUM BROMIDE AND ALBUTEROL SULFATE 2.5; .5 MG/3ML; MG/3ML
1 SOLUTION RESPIRATORY (INHALATION) EVERY 4 HOURS PRN
Status: DISCONTINUED | OUTPATIENT
Start: 2023-03-02 | End: 2023-03-02 | Stop reason: SDUPTHER

## 2023-03-02 RX ORDER — TAMSULOSIN HYDROCHLORIDE 0.4 MG/1
0.4 CAPSULE ORAL DAILY
Qty: 10 CAPSULE | Refills: 0 | Status: SHIPPED | OUTPATIENT
Start: 2023-03-02 | End: 2023-03-12

## 2023-03-02 RX ORDER — SODIUM CHLORIDE 9 MG/ML
INJECTION, SOLUTION INTRAVENOUS PRN
Status: DISCONTINUED | OUTPATIENT
Start: 2023-03-02 | End: 2023-03-02 | Stop reason: HOSPADM

## 2023-03-02 RX ORDER — SODIUM CHLORIDE 0.9 % (FLUSH) 0.9 %
5-40 SYRINGE (ML) INJECTION EVERY 12 HOURS SCHEDULED
Status: DISCONTINUED | OUTPATIENT
Start: 2023-03-02 | End: 2023-03-02 | Stop reason: HOSPADM

## 2023-03-02 RX ORDER — ALBUTEROL SULFATE 2.5 MG/3ML
2.5 SOLUTION RESPIRATORY (INHALATION) EVERY 4 HOURS PRN
Status: DISCONTINUED | OUTPATIENT
Start: 2023-03-02 | End: 2023-03-02 | Stop reason: HOSPADM

## 2023-03-02 RX ORDER — ONDANSETRON 2 MG/ML
INJECTION INTRAMUSCULAR; INTRAVENOUS PRN
Status: DISCONTINUED | OUTPATIENT
Start: 2023-03-02 | End: 2023-03-02 | Stop reason: SDUPTHER

## 2023-03-02 RX ORDER — OXYCODONE HYDROCHLORIDE AND ACETAMINOPHEN 5; 325 MG/1; MG/1
2 TABLET ORAL ONCE
Status: COMPLETED | OUTPATIENT
Start: 2023-03-02 | End: 2023-03-02

## 2023-03-02 RX ORDER — PROPOFOL 10 MG/ML
INJECTION, EMULSION INTRAVENOUS PRN
Status: DISCONTINUED | OUTPATIENT
Start: 2023-03-02 | End: 2023-03-02 | Stop reason: SDUPTHER

## 2023-03-02 RX ORDER — DEXAMETHASONE SODIUM PHOSPHATE 10 MG/ML
INJECTION, EMULSION INTRAMUSCULAR; INTRAVENOUS PRN
Status: DISCONTINUED | OUTPATIENT
Start: 2023-03-02 | End: 2023-03-02 | Stop reason: SDUPTHER

## 2023-03-02 RX ORDER — CEPHALEXIN 500 MG/1
500 CAPSULE ORAL 3 TIMES DAILY
Qty: 15 CAPSULE | Refills: 0 | Status: SHIPPED | OUTPATIENT
Start: 2023-03-02 | End: 2023-03-10 | Stop reason: HOSPADM

## 2023-03-02 RX ORDER — HYDROCODONE BITARTRATE AND ACETAMINOPHEN 5; 325 MG/1; MG/1
1 TABLET ORAL EVERY 6 HOURS PRN
Qty: 18 TABLET | Refills: 0 | Status: CANCELLED | OUTPATIENT
Start: 2023-03-02 | End: 2023-03-07

## 2023-03-02 RX ORDER — MIDAZOLAM HYDROCHLORIDE 1 MG/ML
INJECTION INTRAMUSCULAR; INTRAVENOUS PRN
Status: DISCONTINUED | OUTPATIENT
Start: 2023-03-02 | End: 2023-03-02 | Stop reason: SDUPTHER

## 2023-03-02 RX ORDER — SODIUM CHLORIDE 0.9 % (FLUSH) 0.9 %
5-40 SYRINGE (ML) INJECTION PRN
Status: DISCONTINUED | OUTPATIENT
Start: 2023-03-02 | End: 2023-03-02 | Stop reason: HOSPADM

## 2023-03-02 RX ADMIN — SUGAMMADEX 400 MG: 100 INJECTION, SOLUTION INTRAVENOUS at 14:30

## 2023-03-02 RX ADMIN — HYDROMORPHONE HYDROCHLORIDE 0.5 MG: 1 INJECTION, SOLUTION INTRAMUSCULAR; INTRAVENOUS; SUBCUTANEOUS at 14:50

## 2023-03-02 RX ADMIN — ONDANSETRON 4 MG: 2 INJECTION INTRAMUSCULAR; INTRAVENOUS at 13:56

## 2023-03-02 RX ADMIN — OXYCODONE AND ACETAMINOPHEN 2 TABLET: 5; 325 TABLET ORAL at 15:51

## 2023-03-02 RX ADMIN — Medication 3000 MG: at 13:42

## 2023-03-02 RX ADMIN — HYDROMORPHONE HYDROCHLORIDE 0.5 MG: 1 INJECTION, SOLUTION INTRAMUSCULAR; INTRAVENOUS; SUBCUTANEOUS at 14:45

## 2023-03-02 RX ADMIN — Medication 100 MG: at 13:38

## 2023-03-02 RX ADMIN — PROPOFOL 200 MG: 10 INJECTION, EMULSION INTRAVENOUS at 13:38

## 2023-03-02 RX ADMIN — PHENYLEPHRINE HYDROCHLORIDE 80 MCG: 10 INJECTION INTRAVENOUS at 13:56

## 2023-03-02 RX ADMIN — PHENYLEPHRINE HYDROCHLORIDE 100 MCG: 10 INJECTION INTRAVENOUS at 14:25

## 2023-03-02 RX ADMIN — MIDAZOLAM 2 MG: 1 INJECTION INTRAMUSCULAR; INTRAVENOUS at 13:34

## 2023-03-02 RX ADMIN — SODIUM CHLORIDE: 9 INJECTION, SOLUTION INTRAVENOUS at 11:42

## 2023-03-02 RX ADMIN — PHENYLEPHRINE HYDROCHLORIDE 110 MCG: 10 INJECTION INTRAVENOUS at 14:19

## 2023-03-02 RX ADMIN — FENTANYL CITRATE 100 MCG: 50 INJECTION, SOLUTION INTRAMUSCULAR; INTRAVENOUS at 13:38

## 2023-03-02 RX ADMIN — DEXAMETHASONE SODIUM PHOSPHATE 10 MG: 10 INJECTION, EMULSION INTRAMUSCULAR; INTRAVENOUS at 13:41

## 2023-03-02 RX ADMIN — ROCURONIUM BROMIDE 50 MG: 10 INJECTION, SOLUTION INTRAVENOUS at 13:38

## 2023-03-02 RX ADMIN — PHENYLEPHRINE HYDROCHLORIDE 80 MCG: 10 INJECTION INTRAVENOUS at 14:12

## 2023-03-02 ASSESSMENT — ENCOUNTER SYMPTOMS: SHORTNESS OF BREATH: 1

## 2023-03-02 ASSESSMENT — PAIN SCALES - GENERAL
PAINLEVEL_OUTOF10: 7
PAINLEVEL_OUTOF10: 9
PAINLEVEL_OUTOF10: 9

## 2023-03-02 ASSESSMENT — PAIN DESCRIPTION - DESCRIPTORS
DESCRIPTORS: SORE;SHARP
DESCRIPTORS: ACHING
DESCRIPTORS: BURNING

## 2023-03-02 ASSESSMENT — PAIN - FUNCTIONAL ASSESSMENT: PAIN_FUNCTIONAL_ASSESSMENT: 0-10

## 2023-03-02 ASSESSMENT — PAIN DESCRIPTION - LOCATION
LOCATION: PENIS
LOCATION: PENIS

## 2023-03-02 NOTE — ANESTHESIA PRE PROCEDURE
Department of Anesthesiology  Preprocedure Note       Name:  Kwaku Holman   Age:  63 y.o.  :  1960                                          MRN:  [id number]         Date:  3/2/2023      Surgeon: Yasir Flores):  Lata Peña MD    Procedure: Procedure(s):  Cystoscopy Circumcision  CYSTOSCOPY    Medications prior to admission:   Prior to Admission medications    Medication Sig Start Date End Date Taking? Authorizing Provider   sevelamer (RENVELA) 800 MG tablet Take 1 tablet by mouth 3 times daily (with meals)    Historical Provider, MD   oxyCODONE-acetaminophen (PERCOCET)  MG per tablet Take 1 tablet by mouth every 8 hours as needed for Pain for up to 30 days. Intended supply: 30 days 2/17/23 3/19/23  SAHIL Johnson CNP   doxepin [facility]) 25 MG capsule Take 1 capsule by mouth nightly 2/10/23   SAHIL Mendez CNP   gabapentin (NEURONTIN) 300 MG capsule Take 1 capsule by mouth daily for 30 days. Patient taking differently: Take 300 mg by mouth nightly.  2/10/23 3/12/23  SAHIL Johnson CNP   Methoxy PEG-Epoetin Beta (MIRCERA IJ) Inject 200 mcg into the skin every 14 days 22   Historical Provider, MD   mometasone-formoterol [facility]) 100-5 MCG/ACT inhaler Inhale 2 puffs into the lungs in the morning and at bedtime 22   Historical Provider, MD   montelukast (SINGULAIR) 10 MG tablet Take 10 mg by mouth nightly 10/26/22   Historical Provider, MD   naloxone 4 MG/0.1ML LIQD nasal spray 1 spray by Nasal route as needed 22   Historical Provider, MD   carvedilol (COREG) 25 MG tablet Take 25 mg by mouth 2 times daily As need for High BP 23   Historical Provider, MD   carvedilol (COREG) 3.125 MG tablet Take 1 tablet by mouth 2 times daily  Patient not taking: Reported on 2023  Kade Liu MD   VITAMIN D PO Take 1 tablet by mouth daily Pt states doesn't state it on bottle how much    Historical Provider, MD   rOPINIRole (REQUIP) 0.5 MG tablet take 1 tablet by mouth IN THE AFTERNOON AND 2 TABLETS AT NIGHT. Patient taking differently: Take 0.5 mg by mouth nightly take 1 tablet by mouth IN THE AFTERNOON AND 2 TABLETS AT NIGHT. 11/1/22   Katharina Diaz PA-C   potassium chloride (K-TAB) 10 MEQ extended release tablet Take 2 tablets by mouth in the morning. 7/22/22 8/17/22  Gerber Jerry MD   bumetanide (BUMEX) 1 MG tablet Take 1 tablet by mouth in the morning and 1 tablet before bedtime. 7/22/22 8/17/22  Gerber Jerry MD   CPAP Machine MISC by Does not apply route Please change bipap to 18/14 cm H20. 5/31/22   Christina Galloway PA-C   levothyroxine (SYNTHROID) 175 MCG tablet Take 1 tablet by mouth Daily 3/31/22   Mana Vargas DO   melatonin 3 MG TABS tablet Take 1.5 tablets by mouth nightly as needed (Sleep)  Patient taking differently: Take 3 mg by mouth nightly as needed (Sleep) 3/30/22 3/2/23  Mana Vargas DO   amLODIPine (NORVASC) 10 MG tablet Take 1 tablet by mouth daily  Patient not taking: No sig reported 3/31/22 8/17/22  Mana Vargas DO   ipratropium-albuterol (DUONEB) 0.5-2.5 (3) MG/3ML SOLN nebulizer solution Inhale 3 mLs into the lungs every 4 hours as needed for Shortness of Breath 3/21/22   Amalia Palumbo DO   albuterol sulfate  (90 Base) MCG/ACT inhaler Inhale 2 puffs into the lungs every 6 hours as needed for Wheezing or Shortness of Breath 2/15/22   SAHIL Inman CNP   aspirin EC 81 MG EC tablet Take 1 tablet by mouth daily 9/10/21   SAHIL Ocasio CNP   nitroGLYCERIN (NITROSTAT) 0.4 MG SL tablet Place 1 tablet under the tongue every 5 minutes as needed for Chest pain (up to 3 doses) 6/7/21   SAHIL Ocasio CNP   glimepiride (AMARYL) 4 MG tablet Take 4 mg by mouth daily  1/16/21 8/17/22  Historical Provider, MD   azelastine (ASTELIN) 137 MCG/SPRAY nasal spray 1 spray by Nasal route as needed.  Use in each nostril as directed    Historical Provider, MD       Current medications:    No current facility-administered medications for this visit. No current outpatient medications on file. Facility-Administered Medications Ordered in Other Visits   Medication Dose Route Frequency Provider Last Rate Last Admin    sodium chloride flush 0.9 % injection 5-40 mL  5-40 mL IntraVENous 2 times per day Dennis Tamez MD        sodium chloride flush 0.9 % injection 5-40 mL  5-40 mL IntraVENous PRN Dennis Tamez MD        0.9 % sodium chloride infusion   IntraVENous PRN Dennis Tamez  mL/hr at 03/02/23 1142 New Bag at 03/02/23 1142    ceFAZolin (ANCEF) 3000 mg in sodium chloride 0.9% 100 mL IVPB  3,000 mg IntraVENous On Call to [facility] [place], MD        albuterol (PROVENTIL) nebulizer solution 2.5 mg  2.5 mg Nebulization Q4H PRN Dennis Tamez MD        And    ipratropium (ATROVENT) 0.02 % nebulizer solution 0.5 mg  0.5 mg Nebulization Q4H PRN Dennis Tamez MD           Allergies:     Allergies   Allergen Reactions    Azithromycin Itching     Hands swell and blister      Shellfish-Derived Products Swelling     Tolerates IV dye without any problems      Pcn [Penicillins] Itching    Succinylcholine      Pseudocholinesterase Deficiency    Sulfa Antibiotics Itching    Morphine Nausea And Vomiting     \"head swimming, skin crawling\"    Tape [Adhesive Tape] Rash       Problem List:    Patient Active Problem List   Diagnosis Code    Allergy to bee sting Z91.030    Obesity, Class III, BMI 40-49.9 (morbid obesity) (HCC) E66.01    Weight gain R63.5    Primary hypertension I10    Rheumatoid arteritis (HCC) M05.20    Hypothyroidism E03.9    Gastroenteritis K52.9    Obstructive sleep apnea G47.33    Small intestinal bacterial overgrowth K63.89    Acute diarrhea R19.7    Generalized abdominal pain R10.84    Nausea and vomiting R11.2    Hepatomegaly R16.0    Ileus (HCC) K56.7    Hypokalemia E87.6    S/P partial resection of colon Z90.49    S/P laparotomy Z98.890    Acute renal failure superimposed on stage 5 chronic kidney disease, not on chronic dialysis (HCC) N17.9, N18.5    Hypernatremia E87.0    Serum potassium decreased E87.6    Other headache syndrome G44.89    RLS (restless legs syndrome) G25.81    Psychophysiological insomnia F51.04    Angina, class III ([facility]) I20.9    Acute respiratory failure with hypoxia ([facility]) J96.01    Dyspnea R06.00    CHF (NYHA class III, ACC/AHA stage C) ([facility]) I50.9    Nonhealing surgical wound T81.89XA    Diabetes mellitus ([facility]) E11.9    CAROLE (acute kidney injury) ([facility] N17.9    Acute hypoxemic respiratory failure ([facility]) J96.01    Acute on chronic congestive heart failure ([facility]) I50.9    Stage 3a chronic kidney disease ([facility]) N18.31    Anemia of chronic renal failure N18.9, D63.1    CHF (congestive heart failure), NYHA class III, acute on chronic, combined ([facility]) I50.43    CHF (congestive heart failure), NYHA class II, chronic, diastolic ([facility]) O06.88    Chronic kidney disease, stage IV (severe) ([facility]) N18.4    Hyperkalemia E87.5    ATN (acute tubular necrosis) ([facility]) N17.0    Stage 4 chronic kidney disease ([facility]) N18.4       Past Medical History:        Diagnosis Date    Arthritis     Back problem     back pain-sees [facility] pain mgmt    Bladder disease     Dr. Chan Ruiz CHF with unknown LVEF ([facility] 05/24/2021    Dr. George/CHF clinic    Chronic kidney disease     Constipation     Diabetes mellitus ([facility]     Dr. Omari Villafuerte Hypertension     Hypertensive emergency 04/11/2019    Hypertensive urgency 04/13/2019    Sleep apnea     has bipap-sees [facility]  Valdemar Ache CNP    Thyroid disease        Past Surgical History:        Procedure Laterality Date    ABDOMEN SURGERY      ABDOMEN SURGERY N/A 3/25/2022    ABDOMINAL LYSIS OF ADHESIONS, EXPLANT OF INFECTED MESH performed by Mannie [facility] MD at [facility]      no stents    CARPAL TUNNEL RELEASE Bilateral     COLONOSCOPY  2017    Dr. Kari Saunders Left 12/27/2022 LEFT ARM ARM ARTERIO-VENOUS FISTULA CREATION performed by Gee Morin MD at [facility], [address]      x3 surgeries with 14 repairs    KNEE SURGERY Right 1980's    cartilage    NE EXPLORATORY LAPAROTOMY CELIOTOMY W/WO BIOPSY SPX N/A 2/5/2018    ABDOMINAL EXPLORATION WITH LYSIS OF COMPLICATED ADHESIONS WITH AN EXTENDED RIGHT COLON RESECTION performed by Stephanie Holland MD at [facility] History:    Social History     Tobacco Use    Smoking status: Never    Smokeless tobacco: Current     Types: Chew    Tobacco comments:     quit pipe over 30 yrs ago   Substance Use Topics    Alcohol use: No     Comment: quit 2017                                Ready to quit: Not Answered  Counseling given: Not Answered  Tobacco comments: quit pipe over 30 yrs ago      Vital Signs (Current): There were no vitals filed for this visit.                                            BP Readings from Last 3 Encounters:   03/02/23 103/66   02/10/23 132/68   02/07/23 (!) 140/79       NPO Status:                                                                                 BMI:   Wt Readings from Last 3 Encounters:   03/02/23 300 lb (136.1 kg)   02/10/23 (!) 305 lb 9.6 oz (138.6 kg)   02/07/23 (!) 308 lb (139.7 kg)     There is no height or weight on file to calculate BMI.    CBC:   Lab Results   Component Value Date/Time    WBC 7.4 02/21/2023 07:33 AM    RBC 2.95 02/21/2023 07:33 AM    RBC 5.15 08/12/2011 08:29 PM    HGB 9.3 02/21/2023 07:33 AM    HCT 28.7 02/21/2023 07:33 AM    MCV 97.3 02/21/2023 07:33 AM    RDW 17.8 08/01/2022 10:13 AM     02/21/2023 07:33 AM       CMP:   Lab Results   Component Value Date/Time     02/21/2023 07:33 AM    K 3.6 03/02/2023 11:16 AM    K 3.9 12/27/2022 08:57 AM    CL 93 02/21/2023 07:33 AM    CO2 27 02/21/2023 07:33 AM    BUN 58 02/21/2023 07:33 AM    CREATININE 8.5 02/21/2023 07:33 AM    LABGLOM 7 02/21/2023 07:33 AM    GLUCOSE 93 02/21/2023 07:33 AM    PROT 8.4 08/08/2022 07:09 PM    CALCIUM 10.9 02/21/2023 07:33 AM    BILITOT 0.3 08/08/2022 07:09 PM    ALKPHOS 91 08/08/2022 07:09 PM    AST 9 08/08/2022 07:09 PM    ALT 9 08/08/2022 07:09 PM       POC Tests: No results for input(s): POCGLU, POCNA, POCK, POCCL, POCBUN, POCHEMO, POCHCT in the last 72 hours. Coags:   Lab Results   Component Value Date/Time    INR 1.14 06/16/2021 08:17 AM    APTT 33.5 06/16/2021 08:17 AM       HCG (If Applicable): No results found for: PREGTESTUR, PREGSERUM, HCG, HCGQUANT     ABGs: No results found for: PHART, PO2ART, YHW7ZGB, BCD7GUY, BEART, M1ZUBXMP     Type & Screen (If Applicable):  Lab Results   Component Value Date    LABRH POS 12/27/2022       Drug/Infectious Status (If Applicable):  Lab Results   Component Value Date/Time    HEPCAB Negative 12/01/2017 09:07 AM       COVID-19 Screening (If Applicable):   Lab Results   Component Value Date/Time    COVID19 NOT  DETECTED 04/20/2022 02:25 PM    COVID19 Not Detected 03/19/2022 04:42 PM           Anesthesia Evaluation  Patient summary reviewed   history of anesthetic complications: pseudocholinesterase deficiency. Airway: Mallampati: II  TM distance: >3 FB   Neck ROM: full  Mouth opening: > = 3 FB   Dental:          Pulmonary:   (+) shortness of breath:  sleep apnea:  decreased breath sounds                            Cardiovascular:    (+) hypertension:, CHF:,         Rhythm: regular                      Neuro/Psych:   (+) psychiatric history:            GI/Hepatic/Renal:   (+) liver disease:, renal disease: ESRD, morbid obesity          Endo/Other:    (+) Diabetes, hypothyroidism: arthritis:., .                 Abdominal:   (+) obese,           Vascular: Other Findings:             Anesthesia Plan      general     ASA 4     (Pseudo cholinesterase difficiency)  Induction: intravenous. MIPS: Postoperative opioids intended and Prophylactic antiemetics administered.   Anesthetic plan and risks discussed with patient and spouse.       Plan discussed with CRNA and surgical team.                    Zeynep Stephenson MD   3/2/2023

## 2023-03-02 NOTE — H&P
Baldo Lazaro MD  History and Physical    Patient:  Chantel Pérez  MRN: [id number]  YOB: 1960    HISTORY OF PRESENT ILLNESS:     The patient is a 63 y.o. male who presents with phimosis, bph. Here for procedure. Patient's old records, notes and chart reviewed and summarized above. Baldo Lazaro MD independently reviewed the images and verified the radiology reports from:    No results found. Past Medical History:    Past Medical History:   Diagnosis Date    Arthritis     Back problem     back pain-sees [facility] pain mgmt    Bladder disease     Dr. Tresa Avila    CHF with unknown LVEF ([facility] [address] 05/24/2021    Dr. George/CHF clinic    Chronic kidney disease     Constipation     Diabetes mellitus ([facility] [address]     Dr. Breann Maher    Hypertension     Hypertensive emergency 04/11/2019    Hypertensive urgency 04/13/2019    Sleep apnea     has bipap-sees AZRA Olmedo CNP    Thyroid disease        Past Surgical History:    Past Surgical History:   Procedure Laterality Date    ABDOMEN SURGERY      ABDOMEN SURGERY N/A 3/25/2022    ABDOMINAL LYSIS OF ADHESIONS, EXPLANT OF INFECTED MESH performed by Charles Caicedo MD at [address]      no stents    CARPAL TUNNEL RELEASE Bilateral     COLONOSCOPY  2017    Dr. Mulligan Phy Left 12/27/2022    LEFT ARM ARM ARTERIO-VENOUS FISTULA CREATION performed by Chirag Perez MD at [address] [place] [address], [address] [place] [address] Rd      x3 surgeries with 14 repairs    KNEE SURGERY Right 1980's    cartilage    MT EXPLORATORY LAPAROTOMY CELIOTOMY W/WO BIOPSY SPX N/A 2/5/2018    ABDOMINAL EXPLORATION WITH LYSIS OF COMPLICATED ADHESIONS WITH AN EXTENDED RIGHT COLON RESECTION performed by Charles Caicedo MD at [facility]       Medications Prior to Admission:    Prior to Admission medications    Medication Sig Start Date End Date Taking?  Authorizing Provider   sevelamer (RENVELA) 800 MG tablet Take 1 tablet by mouth 3 times daily (with meals)   Yes Historical Provider, MD   Methoxy PEG-Epoetin Beta (MIRCERA IJ) Inject 200 mcg into the skin every 14 days 12/30/22  Yes Historical Provider, MD   mometasone-formoterol (DULERA) 100-5 MCG/ACT inhaler Inhale 2 puffs into the lungs in the morning and at bedtime 8/18/22  Yes Historical Provider, MD   montelukast (SINGULAIR) 10 MG tablet Take 10 mg by mouth nightly 10/26/22  Yes Historical Provider, MD   naloxone 4 MG/0.1ML LIQD nasal spray 1 spray by Nasal route as needed 12/27/22  Yes Historical Provider, MD   oxyCODONE-acetaminophen (PERCOCET)  MG per tablet Take 1 tablet by mouth every 8 hours as needed for Pain for up to 30 days. Intended supply: 30 days 2/17/23 3/19/23  SAHIL Fischer CNP   doxepin [facility]) 25 MG capsule Take 1 capsule by mouth nightly 2/10/23   SAHIL Correa CNP   gabapentin (NEURONTIN) 300 MG capsule Take 1 capsule by mouth daily for 30 days. Patient taking differently: Take 300 mg by mouth nightly. 2/10/23 3/12/23  SAHIL Fischer CNP   carvedilol (COREG) 25 MG tablet Take 25 mg by mouth 2 times daily As need for High BP 1/9/23   Historical Provider, MD   carvedilol (COREG) 3.125 MG tablet Take 1 tablet by mouth 2 times daily  Patient not taking: Reported on 2/23/2023 1/5/23 2/23/23  Dee Dee Swan MD   VITAMIN D PO Take 1 tablet by mouth daily Pt states doesn't state it on bottle how much    Historical Provider, MD   rOPINIRole (REQUIP) 0.5 MG tablet take 1 tablet by mouth IN THE AFTERNOON AND 2 TABLETS AT NIGHT. Patient taking differently: Take 0.5 mg by mouth nightly take 1 tablet by mouth IN THE AFTERNOON AND 2 TABLETS AT NIGHT. 11/1/22   Tri Diaz PA-C   potassium chloride (K-TAB) 10 MEQ extended release tablet Take 2 tablets by mouth in the morning. 7/22/22 8/17/22  Gerri Sue MD   bumetanide (BUMEX) 1 MG tablet Take 1 tablet by mouth in the morning and 1 tablet before bedtime.  7/22/22 8/17/22  Gerri Sue MD   CPAP Machine MISC by Does not apply route Please change bipap to 18/14 cm H20. 5/31/22   Lorenzo Jackman PA-C   levothyroxine (SYNTHROID) 175 MCG tablet Take 1 tablet by mouth Daily 3/31/22   Mabelene Pump Alicia, DO   melatonin 3 MG TABS tablet Take 1.5 tablets by mouth nightly as needed (Sleep)  Patient taking differently: Take 3 mg by mouth nightly as needed (Sleep) 3/30/22 2/23/23  Mabelene Pump Alicia, DO   amLODIPine (NORVASC) 10 MG tablet Take 1 tablet by mouth daily  Patient not taking: No sig reported 3/31/22 8/17/22  Mabelene Pump Alicia, DO   ipratropium-albuterol (DUONEB) 0.5-2.5 (3) MG/3ML SOLN nebulizer solution Inhale 3 mLs into the lungs every 4 hours as needed for Shortness of Breath 3/21/22   Theadora Estrin, DO   albuterol sulfate  (90 Base) MCG/ACT inhaler Inhale 2 puffs into the lungs every 6 hours as needed for Wheezing or Shortness of Breath 2/15/22   MiyaSAHIL Brar CNP   aspirin EC 81 MG EC tablet Take 1 tablet by mouth daily 9/10/21   SAHIL Alvarado CNP   nitroGLYCERIN (NITROSTAT) 0.4 MG SL tablet Place 1 tablet under the tongue every 5 minutes as needed for Chest pain (up to 3 doses) 6/7/21   SAHIL Alvarado CNP   glimepiride (AMARYL) 4 MG tablet Take 4 mg by mouth daily  1/16/21 8/17/22  Historical Provider, MD   azelastine (ASTELIN) 137 MCG/SPRAY nasal spray 1 spray by Nasal route as needed.  Use in each nostril as directed    Historical Provider, MD       Allergies:  Azithromycin, Shellfish-derived products, Pcn [penicillins], Succinylcholine, Sulfa antibiotics, Morphine, and Tape Raji Guardian tape]    Social History:    Social History     Socioeconomic History    Marital status:      Spouse name: Aimee Matthew    Number of children: 2    Years of education: Not on file    Highest education level: Not on file   Occupational History    Not on file   Tobacco Use    Smoking status: Never    Smokeless tobacco: Current     Types: Chew    Tobacco comments:     quit pipe over 30 yrs ago   Vaping Use    Vaping Use: Never used   Substance and Sexual Activity    Alcohol use: No     Comment: quit 2017    Drug use: No    Sexual activity: Yes   Other Topics Concern    Not on file   Social History Narrative    Not on file     Social Determinants of Health     Financial Resource Strain: Not on file   Food Insecurity: Not on file   Transportation Needs: Not on file   Physical Activity: Not on file   Stress: Not on file   Social Connections: Not on file   Intimate Partner Violence: Not on file   Housing Stability: Not on file       Family History:    Family History   Problem Relation Age of Onset    Cancer Mother         breast    Heart Disease Father         bladder, lung    Cancer Father     Stroke Brother     Heart Attack Brother     Prostate Cancer Brother     Arthritis Brother     No Known Problems Brother     No Known Problems Brother     Diabetes Paternal Grandmother     High Blood Pressure Neg Hx        REVIEW OF SYSTEMS:  Constitutional: negative  Eyes: negative  Respiratory: negative  Cardiovascular: negative  Gastrointestinal: negative  Genitourinary: no acute issues  Musculoskeletal: negative  Skin: negative   Neurological: negative  Hematological/Lymphatic: negative  Psychological: negative    Physical Exam:      No data found. Constitutional: Patient in no acute distress; Neuro: alert and oriented to person place and time. Psych: Mood and affect normal.  Skin: Normal  Lungs: Respiratory effort normal, CTA  Cardiovascular:  Normal peripheral pulses; no murmur. Normal rhythm  Abdomen: Soft, non-tender, non-distended with no CVA, flank pain, hepatosplenomegaly or hernia. Kidneys normal.  Bladder non-tender and not distended. LABS:   No results for input(s): WBC, HGB, HCT, MCV, PLT in the last 72 hours. No results for input(s): NA, K, CL, CO2, PHOS, BUN, CREATININE, CA in the last 72 hours.   Lab Results   Component Value Date    PSA 1.24 (H) 04/07/2022    PSA 0.82 04/15/2021    PSA 1.06 (H) 09/26/2020         Urinalysis: No results for input(s): COLORU, PHUR, LABCAST, WBCUA, RBCUA, MUCUS, TRICHOMONAS, YEAST, BACTERIA, CLARITYU, SPECGRAV, LEUKOCYTESUR, UROBILINOGEN, BILIRUBINUR, BLOODU in the last 72 hours.     Invalid input(s): NITRATE, GLUCOSEUKETONESUAMORPHOUS     -----------------------------------------------------------------      Assessment and Plan     Impression:    Patient Active Problem List   Diagnosis    Allergy to bee sting    Obesity, Class III, BMI 40-49.9 (morbid obesity) ([facility])    Weight gain    Primary hypertension    Rheumatoid arteritis ([facility])    Hypothyroidism    Gastroenteritis    Obstructive sleep apnea    Small intestinal bacterial overgrowth    Acute diarrhea    Generalized abdominal pain    Nausea and vomiting    Hepatomegaly    Ileus ([facility])    Hypokalemia    S/P partial resection of colon    S/P laparotomy    Acute renal failure superimposed on stage 5 chronic kidney disease, not on chronic dialysis (HCC)    Hypernatremia    Serum potassium decreased    Other headache syndrome    RLS (restless legs syndrome)    Psychophysiological insomnia    Angina, class III (HCC)    Acute respiratory failure with hypoxia ([facility])    Dyspnea    CHF (NYHA class III, ACC/AHA stage C) ([facility])    Nonhealing surgical wound    Diabetes mellitus ([address]    CAROLE (acute kidney injury) ([address]    Acute hypoxemic respiratory failure (HCC)    Acute on chronic congestive heart failure (HCC)    Stage 3a chronic kidney disease (HCC)    Anemia of chronic renal failure    CHF (congestive heart failure), NYHA class III, acute on chronic, combined (HCC)    CHF (congestive heart failure), NYHA class II, chronic, diastolic (HCC)    Chronic kidney disease, stage IV (severe) ([facility])    Hyperkalemia    ATN (acute tubular necrosis) ([facility])    Stage 4 chronic kidney disease ([address]       Plan:     Consent obtained; cysto circ in OR today    Shelby Gunn MD  6:35 AM 3/2/2023

## 2023-03-02 NOTE — PROGRESS NOTES
1441 Awake and oriented on arrival to PACU with simple mask   1445 O2 switched to NC 3L , pt c/o # 9 penis pain , medicated with Dilaudid 0.5 mg IV  1450 no change in pain , medicated with Dilaudid 0.5 mg IV  1520 Pt states pain a # 8 but wants to go back to room to see wife   Meets criteria for discharge , transported to [facility]

## 2023-03-02 NOTE — PROGRESS NOTES
CELL PHONE AND GLASSES IN SDS ROOM. RING REMAINS ON LEFT HAND. NO DENTURES, HEARING AIDS OR CLOTHING.

## 2023-03-02 NOTE — DISCHARGE INSTRUCTIONS
Pt ok to discharge home in good condition  No heavy lifting, >10 lbs for today  Pt should avoid strenuous activity for today  Pt should walk moderately at home  Pt ok to shower   Pt may resume diet as tolerated  Pt should take Rx as directed  No driving while on narcotics  Please call attending physician or hospital  with questions  Call or Present to ED if fever (> 101F), intractable nausea vomiting or pain.   Rx in chart    Pt should follow up with Saroj Valentine MD, in 4 weeks, call to confirm appointment

## 2023-03-02 NOTE — PROGRESS NOTES
ADMITTED TO SDS AND ORIENTED TO UNIT. SCDS ON. FALL AND ALLERGY BANDS ON, LIMB ALERT ON LEFT ARM. PT VERBALIZED APPROVAL FOR FIRST NAME, LAST INITIAL AND PHYSICIAN NAME ON UNIT WHITEBOARD.

## 2023-03-02 NOTE — ANESTHESIA POSTPROCEDURE EVALUATION
Department of Anesthesiology  Postprocedure Note    Patient: Farhad Myles  MRN: [id number]  YOB: 1960  Date of evaluation: 3/2/2023      Procedure Summary     Date: 03/02/23 Room / Location: [facility] OR  / [facility] OR    Anesthesia Start: 1334 Anesthesia Stop: 1447    Procedures:       Cystoscopy Circumcision      CYSTOSCOPY Diagnosis:       Phimosis      (Phimosis [N47.1])    Surgeons: Suresh Gold MD Responsible Provider: Ralph Frey MD    Anesthesia Type: general ASA Status: 4          Anesthesia Type: No value filed.    Meche Phase I: Meche Score: 9    Meche Phase II:        Anesthesia Post Evaluation    Patient location during evaluation: PACU  Patient participation: complete - patient participated  Level of consciousness: awake and alert  Airway patency: patent  Nausea & Vomiting: no nausea and no vomiting  Complications: no  Cardiovascular status: hemodynamically stable  Respiratory status: acceptable  Hydration status: euvolemic      [facility]  POST-ANESTHESIA NOTE       Name:  Farhad Myles                                         Age:  62 y.o.  MRN:  [id number]      Last Vitals:  /63   Pulse 59   Temp (!) 68.9 °F (20.5 °C) (Temporal)   Resp 19   Ht 6' 1\" (1.854 m)   Wt 300 lb (136.1 kg)   SpO2 99%   BMI 39.58 kg/m²   Patient Vitals for the past 4 hrs:   BP Temp Temp src Pulse Resp SpO2 Height Weight   03/02/23 1500 131/63 -- -- 59 19 99 % -- --   03/02/23 1455 126/62 -- -- 60 15 95 % -- --   03/02/23 1450 128/64 -- -- 62 15 95 % -- --   03/02/23 1445 130/62 -- -- 64 16 96 % -- --   03/02/23 1441 124/60 (!) 68.9 °F (20.5 °C) Temporal 62 18 95 % -- --   03/02/23 1230 103/66 97.5 °F (36.4 °C) Temporal 76 18 95 % 6' 1\" (1.854 m) 300 lb (136.1 kg)       Level of Consciousness:  Awake    Respiratory:  Stable    Oxygen Saturation:  Stable    Cardiovascular:  Stable    Hydration:  Adequate    PONV:  Stable    Post-op Pain:  Adequate analgesia    Post-op Assessment:  No  apparent anesthetic complications    Additional Follow-Up / Treatment / Comment:  Lorrie Zambrano MD  March 2, 2023   3:25 PM

## 2023-03-02 NOTE — PROGRESS NOTES

## 2023-03-02 NOTE — BRIEF OP NOTE
Brief Postoperative Note      Patient: Chandrika Fatima  YOB: 1960  MRN: [id number]    Date of Procedure: 3/2/2023    Pre-Op Diagnosis: Phimosis [N47.1]    Post-Op Diagnosis: Same       Procedure(s):  Cystoscopy Circumcision  CYSTOSCOPY    Surgeon(s):  Ba Sierra MD    Assistant:  * No surgical staff found *    Anesthesia: General    Estimated Blood Loss (mL): Minimal    Complications: None    Specimens:   * No specimens in log *    Implants:  * No implants in log *      Drains: * No LDAs found *    Findings: postop check one month    Electronically signed by Mata Coon MD on 3/2/2023 at 2:30 PM

## 2023-03-03 ENCOUNTER — TELEPHONE (OUTPATIENT)
Dept: UROLOGY | Age: 63
End: 2023-03-03

## 2023-03-05 NOTE — OP NOTE
[address] [place] [address]. [place]    DATE: 3/2/2023  Patient:  David Delarosa  MRN: [id number]  YOB: 1960    SURGEON: Marilee Bolaños MD.    ASSISTANT: none    PREOPERATIVE DIAGNOSIS: Phimosis    POSTOPERATIVE DIAGNOSIS: Phimosis    PROCEDURE PERFORMED: cystoscopy, Circumcision, dorsal penile nerve block for postoperative pain    ANESTHESIA: General    COMPLICATIONS: none    OR BLOOD LOSS:  Minimal    FLUIDS: Cystalloids per Anesthesia    SPECIMENS:  * No specimens in log *  none    DRAINS: none    INDICATIONS FOR PROCEDURE:  The patient is a 63 y.o. male who presents today with Phimosis [N47.1] here for Cystoscopy Circumcision, CYSTOSCOPY. After risks, benefits and alternatives of the procedure were discussed with the patient, the patient elected to proceed. PROCEDURE:  The patient was brought back to the operating room, was laid in the supine position. Antibiotics were administered. General anesthesia was induced. He was then scrubbed and prepped in the usual sterile fashion. A proper time out was performed. a dorsal slit was performed due to the very tight phimosis. After retracting the foreskin, we then made a circumferential ilsa around the distal shaft, approximately 4 mm from the coronal sulcus. A 15 blade scalpel was used to make an incision through the epidermis. We then pulled the foreskin back to normal position, and marked out the proximal incision. We used a 15 blade scalpel again to make an incision through the epidermis. We then used bovie electrocautery to amputate the foreskin. Meticulous hemostasis was achieved. We then re-approximated the penile shaft skin with 3-0 chromic interrupted sutures. Hemostasis was persistent. Bacitracin ointment was placed around the incision. .  dorsal penile nerve block for postop pain was performed. .    Cystoscopy with flexible cystoscope demonstrrated urianry retention, moderate lateral lobe hypertrophy and bph, trabeculated bladder with cloudy urine     His anesthesia was reversed and he was taken to recovery in stable condition. I was present for all critical portions of the case.

## 2023-03-07 ENCOUNTER — HOSPITAL ENCOUNTER (INPATIENT)
Age: 63
LOS: 2 days | Discharge: HOME | DRG: 947 | End: 2023-03-10
Attending: EMERGENCY MEDICINE | Admitting: INTERNAL MEDICINE
Payer: MEDICARE

## 2023-03-07 DIAGNOSIS — R53.83 OTHER FATIGUE: ICD-10-CM

## 2023-03-07 DIAGNOSIS — R53.1 GENERALIZED WEAKNESS: Primary | ICD-10-CM

## 2023-03-07 LAB
ALBUMIN SERPL BCG-MCNC: 4.1 G/DL (ref 3.5–5.1)
ALP SERPL-CCNC: 69 U/L (ref 38–126)
ALT SERPL W/O P-5'-P-CCNC: < 5 U/L (ref 11–66)
ANION GAP SERPL CALC-SCNC: 11 MEQ/L (ref 8–16)
AST SERPL-CCNC: 18 U/L (ref 5–40)
BASOPHILS ABSOLUTE: 0 THOU/MM3 (ref 0–0.1)
BASOPHILS NFR BLD AUTO: 0.4 %
BILIRUB SERPL-MCNC: 0.4 MG/DL (ref 0.3–1.2)
BUN SERPL-MCNC: 39 MG/DL (ref 7–22)
CALCIUM SERPL-MCNC: 11.2 MG/DL (ref 8.5–10.5)
CHLORIDE SERPL-SCNC: 92 MEQ/L (ref 98–111)
CO2 SERPL-SCNC: 29 MEQ/L (ref 23–33)
CREAT SERPL-MCNC: 5.8 MG/DL (ref 0.4–1.2)
DEPRECATED RDW RBC AUTO: 54.4 FL (ref 35–45)
EOSINOPHIL NFR BLD AUTO: 1.7 %
EOSINOPHILS ABSOLUTE: 0.1 THOU/MM3 (ref 0–0.4)
ERYTHROCYTE [DISTWIDTH] IN BLOOD BY AUTOMATED COUNT: 15.8 % (ref 11.5–14.5)
FLUAV RNA RESP QL NAA+PROBE: NOT DETECTED
FLUBV RNA RESP QL NAA+PROBE: NOT DETECTED
GFR SERPL CREATININE-BSD FRML MDRD: 10 ML/MIN/1.73M2
GLUCOSE SERPL-MCNC: 96 MG/DL (ref 70–108)
HCT VFR BLD AUTO: 31 % (ref 42–52)
HGB BLD-MCNC: 10.6 GM/DL (ref 14–18)
IMM GRANULOCYTES # BLD AUTO: 0.03 THOU/MM3 (ref 0–0.07)
IMM GRANULOCYTES NFR BLD AUTO: 0.6 %
INR PPP: 1.3 (ref 0.85–1.13)
LACTATE SERPL-SCNC: 2.1 MMOL/L (ref 0.5–2)
LYMPHOCYTES ABSOLUTE: 0.3 THOU/MM3 (ref 1–4.8)
LYMPHOCYTES NFR BLD AUTO: 6.6 %
MCH RBC QN AUTO: 32 PG (ref 26–33)
MCHC RBC AUTO-ENTMCNC: 34.2 GM/DL (ref 32.2–35.5)
MCV RBC AUTO: 93.7 FL (ref 80–94)
MONOCYTES ABSOLUTE: 0.6 THOU/MM3 (ref 0.4–1.3)
MONOCYTES NFR BLD AUTO: 11.7 %
NEUTROPHILS NFR BLD AUTO: 79 %
NRBC BLD AUTO-RTO: 0 /100 WBC
OSMOLALITY SERPL CALC.SUM OF ELEC: 273.8 MOSMOL/KG (ref 275–300)
PLATELET # BLD AUTO: 238 THOU/MM3 (ref 130–400)
PMV BLD AUTO: 9.1 FL (ref 9.4–12.4)
POTASSIUM SERPL-SCNC: 4.1 MEQ/L (ref 3.5–5.2)
PROT SERPL-MCNC: 8.4 G/DL (ref 6.1–8)
RBC # BLD AUTO: 3.31 MILL/MM3 (ref 4.7–6.1)
SARS-COV-2 RNA RESP QL NAA+PROBE: NOT DETECTED
SEGMENTED NEUTROPHILS ABSOLUTE COUNT: 4.2 THOU/MM3 (ref 1.8–7.7)
SODIUM SERPL-SCNC: 132 MEQ/L (ref 135–145)
TROPONIN T: 0.06 NG/ML
WBC # BLD AUTO: 5.3 THOU/MM3 (ref 4.8–10.8)

## 2023-03-07 PROCEDURE — 99285 EMERGENCY DEPT VISIT HI MDM: CPT

## 2023-03-07 PROCEDURE — 96374 THER/PROPH/DIAG INJ IV PUSH: CPT

## 2023-03-07 PROCEDURE — 84484 ASSAY OF TROPONIN QUANT: CPT

## 2023-03-07 PROCEDURE — 51798 US URINE CAPACITY MEASURE: CPT

## 2023-03-07 PROCEDURE — 85610 PROTHROMBIN TIME: CPT

## 2023-03-07 PROCEDURE — 87636 SARSCOV2 & INF A&B AMP PRB: CPT

## 2023-03-07 PROCEDURE — 85025 COMPLETE CBC W/AUTO DIFF WBC: CPT

## 2023-03-07 PROCEDURE — 80053 COMPREHEN METABOLIC PANEL: CPT

## 2023-03-07 PROCEDURE — 6360000002 HC RX W HCPCS: Performed by: EMERGENCY MEDICINE

## 2023-03-07 PROCEDURE — 36415 COLL VENOUS BLD VENIPUNCTURE: CPT

## 2023-03-07 PROCEDURE — 83605 ASSAY OF LACTIC ACID: CPT

## 2023-03-07 PROCEDURE — 87040 BLOOD CULTURE FOR BACTERIA: CPT

## 2023-03-07 RX ORDER — ONDANSETRON 2 MG/ML
4 INJECTION INTRAMUSCULAR; INTRAVENOUS ONCE
Status: COMPLETED | OUTPATIENT
Start: 2023-03-07 | End: 2023-03-07

## 2023-03-07 RX ADMIN — ONDANSETRON 4 MG: 2 INJECTION INTRAMUSCULAR; INTRAVENOUS at 23:04

## 2023-03-07 ASSESSMENT — PAIN - FUNCTIONAL ASSESSMENT: PAIN_FUNCTIONAL_ASSESSMENT: NONE - DENIES PAIN

## 2023-03-08 ENCOUNTER — APPOINTMENT (OUTPATIENT)
Dept: CT IMAGING | Age: 63
DRG: 947 | End: 2023-03-08
Payer: MEDICARE

## 2023-03-08 ENCOUNTER — APPOINTMENT (OUTPATIENT)
Dept: GENERAL RADIOLOGY | Age: 63
DRG: 947 | End: 2023-03-08
Payer: MEDICARE

## 2023-03-08 PROBLEM — R77.8 ELEVATED TROPONIN: Status: ACTIVE | Noted: 2023-03-08

## 2023-03-08 PROBLEM — R53.1 WEAKNESS GENERALIZED: Status: ACTIVE | Noted: 2023-03-08

## 2023-03-08 PROBLEM — Z99.2 ESRD ON DIALYSIS (HCC): Status: ACTIVE | Noted: 2023-03-08

## 2023-03-08 PROBLEM — N18.6 ESRD ON DIALYSIS (HCC): Status: ACTIVE | Noted: 2023-03-08

## 2023-03-08 PROBLEM — E83.52 HYPERCALCEMIA: Status: ACTIVE | Noted: 2023-03-08

## 2023-03-08 PROBLEM — R79.89 ELEVATED TROPONIN: Status: ACTIVE | Noted: 2023-03-08

## 2023-03-08 LAB
BACTERIA URNS QL MICRO: ABNORMAL /HPF
BILIRUB UR QL STRIP.AUTO: NEGATIVE
CASTS #/AREA URNS LPF: ABNORMAL /LPF
CASTS 2: ABNORMAL /LPF
CHARACTER UR: ABNORMAL
COLOR: ABNORMAL
CRYSTALS URNS MICRO: ABNORMAL
EPITHELIAL CELLS, UA: ABNORMAL /HPF
GLUCOSE BLD STRIP.AUTO-MCNC: 107 MG/DL (ref 70–108)
GLUCOSE BLD STRIP.AUTO-MCNC: 122 MG/DL (ref 70–108)
GLUCOSE UR QL STRIP.AUTO: NEGATIVE MG/DL
HBV SURFACE AG SERPL QL IA: NEGATIVE
HGB UR QL STRIP.AUTO: ABNORMAL
KETONES UR QL STRIP.AUTO: NEGATIVE
LACTATE SERPL-SCNC: 0.7 MMOL/L (ref 0.5–2)
LACTATE SERPL-SCNC: 1.1 MMOL/L (ref 0.5–2)
MISCELLANEOUS 2: ABNORMAL
MRSA DNA SPEC QL NAA+PROBE: NEGATIVE
NITRITE UR QL STRIP: NEGATIVE
PH UR STRIP.AUTO: 6.5 [PH] (ref 5–9)
PROT UR STRIP.AUTO-MCNC: >= 300 MG/DL
RBC URINE: ABNORMAL /HPF
RENAL EPI CELLS #/AREA URNS HPF: ABNORMAL /[HPF]
SP GR UR REFRACT.AUTO: 1.01 (ref 1–1.03)
TROPONIN T: 0.04 NG/ML
TSH SERPL DL<=0.005 MIU/L-ACNC: 3.97 UIU/ML (ref 0.4–4.2)
UROBILINOGEN, URINE: 0.2 EU/DL (ref 0–1)
WBC #/AREA URNS HPF: ABNORMAL /HPF
WBC #/AREA URNS HPF: ABNORMAL /[HPF]
YEAST LIKE FUNGI URNS QL MICRO: ABNORMAL

## 2023-03-08 PROCEDURE — 6360000002 HC RX W HCPCS: Performed by: PHYSICIAN ASSISTANT

## 2023-03-08 PROCEDURE — 84443 ASSAY THYROID STIM HORMONE: CPT

## 2023-03-08 PROCEDURE — 6370000000 HC RX 637 (ALT 250 FOR IP): Performed by: PHYSICIAN ASSISTANT

## 2023-03-08 PROCEDURE — 2580000003 HC RX 258: Performed by: PHYSICIAN ASSISTANT

## 2023-03-08 PROCEDURE — 70450 CT HEAD/BRAIN W/O DYE: CPT

## 2023-03-08 PROCEDURE — 83605 ASSAY OF LACTIC ACID: CPT

## 2023-03-08 PROCEDURE — 99222 1ST HOSP IP/OBS MODERATE 55: CPT | Performed by: PHYSICIAN ASSISTANT

## 2023-03-08 PROCEDURE — 99222 1ST HOSP IP/OBS MODERATE 55: CPT | Performed by: INTERNAL MEDICINE

## 2023-03-08 PROCEDURE — 87641 MR-STAPH DNA AMP PROBE: CPT

## 2023-03-08 PROCEDURE — 90935 HEMODIALYSIS ONE EVALUATION: CPT

## 2023-03-08 PROCEDURE — 87086 URINE CULTURE/COLONY COUNT: CPT

## 2023-03-08 PROCEDURE — 71045 X-RAY EXAM CHEST 1 VIEW: CPT

## 2023-03-08 PROCEDURE — 36415 COLL VENOUS BLD VENIPUNCTURE: CPT

## 2023-03-08 PROCEDURE — 81001 URINALYSIS AUTO W/SCOPE: CPT

## 2023-03-08 PROCEDURE — 6370000000 HC RX 637 (ALT 250 FOR IP): Performed by: EMERGENCY MEDICINE

## 2023-03-08 PROCEDURE — 5A1D70Z PERFORMANCE OF URINARY FILTRATION, INTERMITTENT, LESS THAN 6 HOURS PER DAY: ICD-10-PCS | Performed by: INTERNAL MEDICINE

## 2023-03-08 PROCEDURE — 87340 HEPATITIS B SURFACE AG IA: CPT

## 2023-03-08 PROCEDURE — 1200000000 HC SEMI PRIVATE

## 2023-03-08 PROCEDURE — 94640 AIRWAY INHALATION TREATMENT: CPT

## 2023-03-08 PROCEDURE — 84484 ASSAY OF TROPONIN QUANT: CPT

## 2023-03-08 PROCEDURE — 82948 REAGENT STRIP/BLOOD GLUCOSE: CPT

## 2023-03-08 PROCEDURE — 99233 SBSQ HOSP IP/OBS HIGH 50: CPT | Performed by: NURSE PRACTITIONER

## 2023-03-08 RX ORDER — CARVEDILOL 25 MG/1
25 TABLET ORAL 2 TIMES DAILY WITH MEALS
Status: DISCONTINUED | OUTPATIENT
Start: 2023-03-08 | End: 2023-03-09 | Stop reason: SDUPTHER

## 2023-03-08 RX ORDER — ACETAMINOPHEN 650 MG/1
650 SUPPOSITORY RECTAL EVERY 6 HOURS PRN
Status: DISCONTINUED | OUTPATIENT
Start: 2023-03-08 | End: 2023-03-10 | Stop reason: HOSPADM

## 2023-03-08 RX ORDER — POLYETHYLENE GLYCOL 3350 17 G/17G
17 POWDER, FOR SOLUTION ORAL DAILY PRN
Status: DISCONTINUED | OUTPATIENT
Start: 2023-03-08 | End: 2023-03-10 | Stop reason: HOSPADM

## 2023-03-08 RX ORDER — SEVELAMER CARBONATE 800 MG/1
800 TABLET, FILM COATED ORAL
Status: DISCONTINUED | OUTPATIENT
Start: 2023-03-08 | End: 2023-03-10 | Stop reason: HOSPADM

## 2023-03-08 RX ORDER — OXYCODONE AND ACETAMINOPHEN 10; 325 MG/1; MG/1
1 TABLET ORAL EVERY 8 HOURS PRN
Status: DISCONTINUED | OUTPATIENT
Start: 2023-03-08 | End: 2023-03-09

## 2023-03-08 RX ORDER — OXYCODONE HYDROCHLORIDE AND ACETAMINOPHEN 5; 325 MG/1; MG/1
1 TABLET ORAL ONCE
Status: COMPLETED | OUTPATIENT
Start: 2023-03-08 | End: 2023-03-08

## 2023-03-08 RX ORDER — NITROGLYCERIN 0.4 MG/1
0.4 TABLET SUBLINGUAL EVERY 5 MIN PRN
Status: DISCONTINUED | OUTPATIENT
Start: 2023-03-08 | End: 2023-03-10 | Stop reason: HOSPADM

## 2023-03-08 RX ORDER — GABAPENTIN 300 MG/1
300 CAPSULE ORAL NIGHTLY
Status: DISCONTINUED | OUTPATIENT
Start: 2023-03-08 | End: 2023-03-10 | Stop reason: HOSPADM

## 2023-03-08 RX ORDER — ALBUTEROL SULFATE 90 UG/1
2 AEROSOL, METERED RESPIRATORY (INHALATION) EVERY 4 HOURS PRN
Status: DISCONTINUED | OUTPATIENT
Start: 2023-03-08 | End: 2023-03-10 | Stop reason: HOSPADM

## 2023-03-08 RX ORDER — SODIUM CHLORIDE 0.9 % (FLUSH) 0.9 %
10 SYRINGE (ML) INJECTION EVERY 12 HOURS SCHEDULED
Status: DISCONTINUED | OUTPATIENT
Start: 2023-03-08 | End: 2023-03-10 | Stop reason: HOSPADM

## 2023-03-08 RX ORDER — HEPARIN SODIUM 5000 [USP'U]/ML
5000 INJECTION, SOLUTION INTRAVENOUS; SUBCUTANEOUS EVERY 8 HOURS SCHEDULED
Status: DISCONTINUED | OUTPATIENT
Start: 2023-03-08 | End: 2023-03-10 | Stop reason: HOSPADM

## 2023-03-08 RX ORDER — SODIUM CHLORIDE 0.9 % (FLUSH) 0.9 %
10 SYRINGE (ML) INJECTION PRN
Status: DISCONTINUED | OUTPATIENT
Start: 2023-03-08 | End: 2023-03-10 | Stop reason: HOSPADM

## 2023-03-08 RX ORDER — DEXTROSE MONOHYDRATE 100 MG/ML
INJECTION, SOLUTION INTRAVENOUS CONTINUOUS PRN
Status: DISCONTINUED | OUTPATIENT
Start: 2023-03-08 | End: 2023-03-10 | Stop reason: HOSPADM

## 2023-03-08 RX ORDER — ONDANSETRON 4 MG/1
4 TABLET, ORALLY DISINTEGRATING ORAL EVERY 8 HOURS PRN
Status: DISCONTINUED | OUTPATIENT
Start: 2023-03-08 | End: 2023-03-10 | Stop reason: HOSPADM

## 2023-03-08 RX ORDER — ALBUTEROL SULFATE 90 UG/1
2 AEROSOL, METERED RESPIRATORY (INHALATION) EVERY 6 HOURS PRN
Status: DISCONTINUED | OUTPATIENT
Start: 2023-03-08 | End: 2023-03-08

## 2023-03-08 RX ORDER — ASPIRIN 81 MG/1
81 TABLET ORAL DAILY
Status: DISCONTINUED | OUTPATIENT
Start: 2023-03-08 | End: 2023-03-10 | Stop reason: HOSPADM

## 2023-03-08 RX ORDER — INSULIN LISPRO 100 [IU]/ML
0-4 INJECTION, SOLUTION INTRAVENOUS; SUBCUTANEOUS
Status: DISCONTINUED | OUTPATIENT
Start: 2023-03-08 | End: 2023-03-09

## 2023-03-08 RX ORDER — DOXEPIN HYDROCHLORIDE 25 MG/1
25 CAPSULE ORAL NIGHTLY
Status: DISCONTINUED | OUTPATIENT
Start: 2023-03-08 | End: 2023-03-10 | Stop reason: HOSPADM

## 2023-03-08 RX ORDER — INSULIN LISPRO 100 [IU]/ML
0-4 INJECTION, SOLUTION INTRAVENOUS; SUBCUTANEOUS NIGHTLY
Status: DISCONTINUED | OUTPATIENT
Start: 2023-03-08 | End: 2023-03-09

## 2023-03-08 RX ORDER — TAMSULOSIN HYDROCHLORIDE 0.4 MG/1
0.4 CAPSULE ORAL DAILY
Status: DISCONTINUED | OUTPATIENT
Start: 2023-03-08 | End: 2023-03-10 | Stop reason: HOSPADM

## 2023-03-08 RX ORDER — 0.9 % SODIUM CHLORIDE 0.9 %
1000 INTRAVENOUS SOLUTION INTRAVENOUS ONCE
Status: COMPLETED | OUTPATIENT
Start: 2023-03-08 | End: 2023-03-08

## 2023-03-08 RX ORDER — ONDANSETRON 2 MG/ML
4 INJECTION INTRAMUSCULAR; INTRAVENOUS EVERY 6 HOURS PRN
Status: DISCONTINUED | OUTPATIENT
Start: 2023-03-08 | End: 2023-03-10 | Stop reason: HOSPADM

## 2023-03-08 RX ORDER — HYDROXYZINE HYDROCHLORIDE 25 MG/1
25 TABLET, FILM COATED ORAL ONCE
Status: COMPLETED | OUTPATIENT
Start: 2023-03-08 | End: 2023-03-08

## 2023-03-08 RX ORDER — MONTELUKAST SODIUM 10 MG/1
10 TABLET ORAL NIGHTLY
Status: DISCONTINUED | OUTPATIENT
Start: 2023-03-08 | End: 2023-03-10 | Stop reason: HOSPADM

## 2023-03-08 RX ORDER — SODIUM CHLORIDE 9 MG/ML
INJECTION, SOLUTION INTRAVENOUS PRN
Status: DISCONTINUED | OUTPATIENT
Start: 2023-03-08 | End: 2023-03-10 | Stop reason: HOSPADM

## 2023-03-08 RX ORDER — ROPINIROLE 0.5 MG/1
0.5 TABLET, FILM COATED ORAL NIGHTLY
Status: DISCONTINUED | OUTPATIENT
Start: 2023-03-08 | End: 2023-03-10 | Stop reason: HOSPADM

## 2023-03-08 RX ORDER — ACETAMINOPHEN 325 MG/1
650 TABLET ORAL EVERY 6 HOURS PRN
Status: DISCONTINUED | OUTPATIENT
Start: 2023-03-08 | End: 2023-03-10 | Stop reason: HOSPADM

## 2023-03-08 RX ORDER — LANOLIN ALCOHOL/MO/W.PET/CERES
4.5 CREAM (GRAM) TOPICAL NIGHTLY PRN
Status: DISCONTINUED | OUTPATIENT
Start: 2023-03-08 | End: 2023-03-10 | Stop reason: HOSPADM

## 2023-03-08 RX ADMIN — LEVOTHYROXINE SODIUM 175 MCG: 0.03 TABLET ORAL at 15:40

## 2023-03-08 RX ADMIN — ROPINIROLE HYDROCHLORIDE 0.5 MG: 0.5 TABLET, FILM COATED ORAL at 20:38

## 2023-03-08 RX ADMIN — SODIUM CHLORIDE: 9 INJECTION, SOLUTION INTRAVENOUS at 08:12

## 2023-03-08 RX ADMIN — HYDROMORPHONE HYDROCHLORIDE 1 MG: 1 INJECTION, SOLUTION INTRAMUSCULAR; INTRAVENOUS; SUBCUTANEOUS at 22:06

## 2023-03-08 RX ADMIN — MOMETASONE FUROATE AND FORMOTEROL FUMARATE DIHYDRATE 2 PUFF: 100; 5 AEROSOL RESPIRATORY (INHALATION) at 07:52

## 2023-03-08 RX ADMIN — ASPIRIN 81 MG: 81 TABLET, COATED ORAL at 08:22

## 2023-03-08 RX ADMIN — GABAPENTIN 300 MG: 300 CAPSULE ORAL at 20:38

## 2023-03-08 RX ADMIN — SODIUM CHLORIDE 1000 ML: 9 INJECTION, SOLUTION INTRAVENOUS at 05:11

## 2023-03-08 RX ADMIN — Medication 4.5 MG: at 20:37

## 2023-03-08 RX ADMIN — SEVELAMER CARBONATE 800 MG: 800 TABLET, FILM COATED ORAL at 18:48

## 2023-03-08 RX ADMIN — SEVELAMER CARBONATE 800 MG: 800 TABLET, FILM COATED ORAL at 15:40

## 2023-03-08 RX ADMIN — HYDROXYZINE HYDROCHLORIDE 25 MG: 25 TABLET, FILM COATED ORAL at 05:31

## 2023-03-08 RX ADMIN — MONTELUKAST SODIUM 10 MG: 10 TABLET ORAL at 20:38

## 2023-03-08 RX ADMIN — CARVEDILOL 25 MG: 25 TABLET, FILM COATED ORAL at 15:41

## 2023-03-08 RX ADMIN — HYDROMORPHONE HYDROCHLORIDE 1 MG: 1 INJECTION, SOLUTION INTRAMUSCULAR; INTRAVENOUS; SUBCUTANEOUS at 16:30

## 2023-03-08 RX ADMIN — DOXEPIN HYDROCHLORIDE 25 MG: 25 CAPSULE ORAL at 20:38

## 2023-03-08 RX ADMIN — OXYCODONE AND ACETAMINOPHEN 1 TABLET: 10; 325 TABLET ORAL at 20:36

## 2023-03-08 RX ADMIN — MOMETASONE FUROATE AND FORMOTEROL FUMARATE DIHYDRATE 2 PUFF: 100; 5 AEROSOL RESPIRATORY (INHALATION) at 17:37

## 2023-03-08 RX ADMIN — VANCOMYCIN HYDROCHLORIDE 2500 MG: 500 INJECTION, POWDER, LYOPHILIZED, FOR SOLUTION INTRAVENOUS at 08:32

## 2023-03-08 RX ADMIN — TAMSULOSIN HYDROCHLORIDE 0.4 MG: 0.4 CAPSULE ORAL at 15:40

## 2023-03-08 RX ADMIN — OXYCODONE HYDROCHLORIDE AND ACETAMINOPHEN 1 TABLET: 5; 325 TABLET ORAL at 02:45

## 2023-03-08 RX ADMIN — HEPARIN SODIUM 5000 UNITS: 5000 INJECTION INTRAVENOUS; SUBCUTANEOUS at 16:15

## 2023-03-08 RX ADMIN — CARVEDILOL 25 MG: 25 TABLET, FILM COATED ORAL at 18:50

## 2023-03-08 RX ADMIN — HYDROMORPHONE HYDROCHLORIDE 1 MG: 1 INJECTION, SOLUTION INTRAMUSCULAR; INTRAVENOUS; SUBCUTANEOUS at 05:09

## 2023-03-08 RX ADMIN — HEPARIN SODIUM 5000 UNITS: 5000 INJECTION INTRAVENOUS; SUBCUTANEOUS at 05:31

## 2023-03-08 ASSESSMENT — PAIN DESCRIPTION - DESCRIPTORS
DESCRIPTORS: ACHING;THROBBING
DESCRIPTORS: ACHING;STABBING
DESCRIPTORS: THROBBING;ACHING
DESCRIPTORS: ACHING
DESCRIPTORS: ACHING
DESCRIPTORS: ACHING;STABBING

## 2023-03-08 ASSESSMENT — PAIN DESCRIPTION - LOCATION
LOCATION: BACK
LOCATION: ABDOMEN

## 2023-03-08 ASSESSMENT — PAIN SCALES - GENERAL
PAINLEVEL_OUTOF10: 9
PAINLEVEL_OUTOF10: 4
PAINLEVEL_OUTOF10: 8
PAINLEVEL_OUTOF10: 7
PAINLEVEL_OUTOF10: 10
PAINLEVEL_OUTOF10: 7
PAINLEVEL_OUTOF10: 9
PAINLEVEL_OUTOF10: 9

## 2023-03-08 ASSESSMENT — PAIN DESCRIPTION - PAIN TYPE: TYPE: ACUTE PAIN

## 2023-03-08 ASSESSMENT — PAIN DESCRIPTION - FREQUENCY: FREQUENCY: CONTINUOUS

## 2023-03-08 ASSESSMENT — PAIN DESCRIPTION - ORIENTATION
ORIENTATION: LOWER
ORIENTATION: RIGHT;LEFT;LOWER
ORIENTATION: LOWER

## 2023-03-08 ASSESSMENT — PAIN - FUNCTIONAL ASSESSMENT: PAIN_FUNCTIONAL_ASSESSMENT: NONE - DENIES PAIN

## 2023-03-08 NOTE — ED NOTES
Pt O2 dropping to mid-80's. Pt states he wears 2L NC at home PRN. Pt placed on 2L NC.       Julius Uribe RN  03/07/23 8977

## 2023-03-08 NOTE — ED TRIAGE NOTES
Pt from lobby via wheelchair with c/o fatigue, muscle spasms and generally feeling unwell beginning this morning. Pt did home dialysis  today. LKW yesterday evening. Pt has cystoscopy on 3/2. Pt states he has not urinated at all today.

## 2023-03-08 NOTE — CARE COORDINATION
Case Management Assessment  Initial Evaluation    Date/Time of Evaluation: 3/8/2023 3:17 PM  Assessment Completed by: Claudene Clayman, RN    If patient is discharged prior to next notation, then this note serves as note for discharge by case management. Patient Name: Rogers Silverman                   YOB: 1960  Diagnosis: Weakness generalized [R53.1]  Generalized weakness [R53.1]  Other fatigue [R53.83]                   Date / Time: 3/7/2023  9:10 PM  Location: [address] [place] [address]     Patient Admission Status: Inpatient   Readmission Risk Low 0-14, Mod 15-19), High > 20: Readmission Risk Score: 24.3    Current PCP: Betsy Cooks, DO  PCP verified by CM? Yes    Chart Reviewed: Yes      History Provided by: Patient  Patient Orientation: Alert and Oriented    Patient Cognition: Alert    Hospitalization in the last 30 days (Readmission):  No    If yes, Readmission Assessment in CM Navigator will be completed. Advance Directives:      Code Status: Full Code   Patient's Primary Decision Maker is: Legal Next of Kin    Primary Decision Maker: Sravani Ant Spouse - [phone]    Discharge Planning:    Patient lives with: Spouse/Significant Other Type of Home: House  Primary Care Giver: Self  Patient Support Systems include: Spouse/Significant Other   Current Financial resources: Medicare  Current community resources: None  Current services prior to admission: Oxygen Therapy, Other (Comment), C-pap (HD equipment.)            Current DME: Oxygen Therapy (Comment) (2L/NC prn. Provider is [employer].)            Type of Home Care services:  None    ADLS  Prior functional level: Independent in ADLs/IADLs  Current functional level: Independent in ADLs/IADLs    Family can provide assistance at DC: Yes  Would you like Case Management to discuss the discharge plan with any other family members/significant others, and if so, who?  Yes  Plans to Return to Present Housing: Yes  Other Identified Issues/Barriers to RETURNING to current housing: No  Potential Assistance needed at discharge: Home Care            Potential DME:    Patient expects to discharge to: House  Plan for transportation at discharge: Family    Financial    Payor: [employer] MEDICARE / Plan: [employer] CHOICE-PPO MEDICARE / Product Type: *No Product type* /     Does insurance require precert for SNF: Yes    Potential assistance Purchasing Medications: No  Meds-to-Beds request: Yes      [id number] - [place], [place] - [address] - P [phone] - F [phone]  [address] [facility] [address]  Phone: [phone] Fax: [phone]    [facility] - [facility] [address] [facility] [address] P [phone] - F [phone] [address] [facility] [address]  Phone: [phone] Fax: [phone]      Notes:    Factors facilitating achievement of predicted outcomes: Family support    Barriers to discharge: Decreased endurance, Upper extremity weakness, and Lower extremity weakness    Additional Case Management Notes: Admitted from ER for fatigue and weakness, feeling unwell. Performs home HD. Nephrology consulted. PT/OT. Maxipime for sepsis.    Procedure: 3-8-23 CT Head  Suspected small old lacunar infarct of the right inferior basal ganglia.    Otherwise no acute findings.       The Plan for Transition of Care is related to the following treatment goals of Weakness generalized [R53.1]  Generalized weakness [R53.1]  Other fatigue [R53.83]    Patient Goals/Plan/Treatment Preferences: Met with Farhad this afternoon. He is from home with spouse. Self sufficient when feeling well. He has home Hemodialysis. He has a PCP and is insured. Follow for discharge needs.   Transportation/Food Security/Housekeeping Addressed: No issues identified.     Tonia Muller RN  Case Management Department

## 2023-03-08 NOTE — CONSULTS
CONSULTATION NOTE :ID       Patient - Yan Hood,  Age - 63 y.o.    - 1960      Room Number - 8B-29/029-A   N -  [id number]   [facility]  Date of Admission -  3/7/2023  9:10 PM  Patient's PCP: Perry Saul DO     Requesting Physician: SAHIL Pena *    REASON FOR CONSULTATION   Suspected sepsis  CHIEF COMPLAINT   Weakenss and tremors    HISTORY OF PRESENT ILLNESS       This is a very pleasant 63 y.o. male who was admitted to the hospital with a chief complaints of weakness and tremors. He reports of having generalized weakness and tremors. He had cystoscopy and was placed on oral antibiotic three times a day and prior to admission he reports of tremors and had generalized weakness. He denies any fever or chills. Had hx of incomplete empyting of the bladder, has dysuria. . he has hx of ESRD he is on home HD, has a tunelled catheter and a fistula on the left upper arm. He has no cough or chest pain, no abdominal pain no nausea or vomiting    PAST MEDICAL  HISTORY       Past Medical History:   Diagnosis Date    Arthritis     Back problem     back pain-sees [facility] pain mgmt    Bladder disease     Dr. Noreen Tran    CHF with unknown LVEF ([facility] [address] 2021    Dr. George/CHF clinic    Chronic kidney disease     Constipation     Diabetes mellitus ([facility] [address]     Dr. Judith Edmond    Hypertension     Hypertensive emergency 2019    Hypertensive urgency 2019    Sleep apnea     has bipap-sees AZRA Olmedo CNP    Thyroid disease        PAST SURGICAL HISTORY     Past Surgical History:   Procedure Laterality Date    ABDOMEN SURGERY      ABDOMEN SURGERY N/A 3/25/2022    ABDOMINAL LYSIS OF ADHESIONS, EXPLANT OF INFECTED MESH performed by Preet Bragg MD at [address]      no stents    CARPAL TUNNEL RELEASE Bilateral     CIRCUMCISION N/A 3/2/2023    Cystoscopy Circumcision performed by Temitope Gabriel MD at [facility] 2017 Dr. Jose J Reed N/A 3/2/2023    CYSTOSCOPY performed by Amina Neves MD at [address] Left 12/27/2022    LEFT ARM ARM ARTERIO-VENOUS FISTULA CREATION performed by Ganga Draper MD at [address] [facility], [address]      x3 surgeries with 14 repairs    KNEE SURGERY Right 1980's    cartilage    UT EXPLORATORY LAPAROTOMY CELIOTOMY W/WO BIOPSY SPX N/A 2/5/2018    ABDOMINAL EXPLORATION WITH LYSIS OF COMPLICATED ADHESIONS WITH AN EXTENDED RIGHT COLON RESECTION performed by Gricel Gerber MD at [address]:       Scheduled Meds:   sodium chloride flush  10 mL IntraVENous 2 times per day    heparin (porcine)  5,000 Units SubCUTAneous 3 times per day    aspirin EC  81 mg Oral Daily    carvedilol  25 mg Oral BID with meals    doxepin  25 mg Oral Nightly    gabapentin  300 mg Oral Nightly    mometasone-formoterol  2 puff Inhalation BID    montelukast  10 mg Oral Nightly    rOPINIRole  0.5 mg Oral Nightly    sevelamer  800 mg Oral TID WC    tamsulosin  0.4 mg Oral Daily    levothyroxine  175 mcg Oral Daily    [START ON 3/9/2023] cefepime  1,000 mg IntraVENous Q24H    vancomycin (VANCOCIN) intermittent dosing (placeholder)   Other RX Placeholder    insulin lispro  0-4 Units SubCUTAneous TID WC    insulin lispro  0-4 Units SubCUTAneous Nightly     Continuous Infusions:   sodium chloride 20 mL/hr at 03/08/23 0812    dextrose       PRN Meds:sodium chloride flush, sodium chloride, ondansetron **OR** ondansetron, polyethylene glycol, acetaminophen **OR** acetaminophen, melatonin, nitroGLYCERIN, oxyCODONE-acetaminophen, HYDROmorphone **OR** HYDROmorphone, albuterol sulfate HFA, glucose, dextrose bolus **OR** dextrose bolus, glucagon (rDNA), dextrose  Allergies:   ALLERGIES:    Azithromycin, Shellfish-derived products, Pcn [penicillins], Succinylcholine, Sulfa antibiotics, Morphine, and Tape [adhesive tape]        SOCIAL HISTORY:     TOBACCO:   reports that he has never smoked. His smokeless tobacco use includes chew. ETOH:   reports no history of alcohol use. Patient currently lives with family        FAMILY HISTORY:         Problem Relation Age of Onset    Cancer Mother         breast    Heart Disease Father         bladder, lung    Cancer Father     Stroke Brother     Heart Attack Brother     Prostate Cancer Brother     Arthritis Brother     No Known Problems Brother     No Known Problems Brother     Diabetes Paternal Grandmother     High Blood Pressure Neg Hx        REVIEW OF SYSTEMS:     Constitutional: no fever, no night sweats, no fatigue, no weight loss. Head: no head ache , no head injury, no migranes. Eye: no eye discharge, blurring of vision, no double vision,no eye pain. Ears: no hearing difficulty, no tinnitus  Mouth/throat: no ulceration, dental caries , dysphagia, no hoarseness and voice change  Respiratory: no cough no chest pain,no shortness of breath,no wheezing  CVS: no palpitation, no chest pain,   GI: no abdominal pain, no nausea , no vomiting, no constipation,no diarrhea. ZHENG: no dysuria, frequency and urgency, no hematuria, no kidney stones  Musculoskeletal: weakness  Endocrine: no polyuria, polydipsia, no cold or heat intolerance  Hematology: no anemia, no easy brusing or bleeding, no hx of clotting disorder  Dermatology: no skin rash, no skin lesions, no pruritis,  Neurological:no headaches,no dizziness, no seizure, no numbness. Psychiatry: no depression, no anxiety,no panic attacks, no suicide ideation    PHYSICAL EXAM:     BP (!) 123/58   Pulse 77   Temp 97.9 °F (36.6 °C) (Oral)   Resp 18   Ht 6' 1\" (1.854 m)   Wt 293 lb 6.9 oz (133.1 kg)   SpO2 98%   BMI 38.71 kg/m²   General apperance:  Awake, alert, not in distress. obese  HEENT: pink conjunctiva, unicteric sclera, moist oral mucosa. Chest:  bilateral air entry, diminished breath sound  Cardiovascular:  RRR ,S1S2, no murmur or gallop.   Abdomen:  Soft, non tender to palpation. Extremities: no rash  Skin:  Warm and dry. CNS:oriented        LABS:     CBC:   Recent Labs     03/07/23 2131   WBC 5.3   HGB 10.6*        BMP:    Recent Labs     03/07/23 2131   *   K 4.1   CL 92*   CO2 29   BUN 39*   CREATININE 5.8*   GLUCOSE 96     Calcium:  Recent Labs     03/07/23 2131   CALCIUM 11.2*     Ionized Calcium:No results for input(s): IONCA in the last 72 hours. Magnesium:No results for input(s): MG in the last 72 hours. Phosphorus:No results for input(s): PHOS in the last 72 hours. BNP:No results for input(s): BNP in the last 72 hours. Glucose:No results for input(s): POCGLU in the last 72 hours. HgbA1C: No results for input(s): LABA1C in the last 72 hours. INR:   Recent Labs     03/07/23 2218   INR 1.30*     Hepatic:   Recent Labs     03/07/23 2131   ALKPHOS 69   ALT <5*   AST 18   PROT 8.4*   BILITOT 0.4   LABALBU 4.1     Amylase and Lipase:  Recent Labs     03/08/23  0929   LACTA 0.7     Lactic Acid:   Recent Labs     03/08/23  0929   LACTA 0.7     Troponin: No results for input(s): CKTOTAL, CKMB, TROPONINI in the last 72 hours. BNP: No results for input(s): BNP in the last 72 hours. Lipids: No results for input(s): CHOL, TRIG, HDL, LDL, LDLCALC in the last 72 hours.   ABGs: No results found for: PHART, PO2ART, UXO1GPM, IIT0XHR, BEART    Cultures:      CXR:       UA:   Recent Labs     03/08/23  0500   PHUR 6.5   COLORU ORANGE*   PROTEINU >= 300*   BLOODU LARGE*   RBCUA    WBCUA 15-25   BACTERIA NONE SEEN   NITRU NEGATIVE   GLUCOSEU NEGATIVE   BILIRUBINUR NEGATIVE   UROBILINOGEN 0.2   KETUA NEGATIVE         IMAGING:    Micro:   Lab Results   Component Value Date/Time    BC No growth-preliminary No growth 03/16/2022 12:02 PM       Problem list of patient      Patient Active Problem List   Diagnosis Code    Allergy to bee sting Z91.030    Obesity, Class III, BMI 40-49.9 (morbid obesity) ([facility]) E66.01    Weight gain R63.5    Primary hypertension I10 Rheumatoid arteritis (HCC) M05.20    Hypothyroidism E03.9    Gastroenteritis K52.9    Obstructive sleep apnea G47.33    Small intestinal bacterial overgrowth K63.89    Acute diarrhea R19.7    Generalized abdominal pain R10.84    Nausea and vomiting R11.2    Hepatomegaly R16.0    Ileus (HCC) K56.7    Hypokalemia E87.6    S/P partial resection of colon Z90.49    S/P laparotomy Z98.890    Acute renal failure superimposed on stage 5 chronic kidney disease, not on chronic dialysis (HCC) N17.9, N18.5    Hypernatremia E87.0    Serum potassium decreased E87.6    Other headache syndrome G44.89    RLS (restless legs syndrome) G25.81    Psychophysiological insomnia F51.04    Angina, class III ([facility]) I20.9    Acute respiratory failure with hypoxia ([facility]) J96.01    Dyspnea R06.00    CHF (NYHA class III, ACC/AHA stage C) ([facility]) I50.9    Nonhealing surgical wound T81.89XA    Diabetes mellitus ([facility]) E11.9    CAROLE (acute kidney injury) ([facility] [address] N17.9    Acute hypoxemic respiratory failure ([facility]) J96.01    Acute on chronic congestive heart failure ([facility]) I50.9    Stage 3a chronic kidney disease ([facility]) N18.31    Anemia of chronic renal failure N18.9, D63.1    CHF (congestive heart failure), NYHA class III, acute on chronic, combined ([facility]) I50.43    CHF (congestive heart failure), NYHA class II, chronic, diastolic ([facility]) P47.15    Chronic kidney disease, stage IV (severe) ([facility]) N18.4    Hyperkalemia E87.5    ATN (acute tubular necrosis) ([facility]) N17.0    Stage 4 chronic kidney disease (HCC) N18.4    Generalized weakness R53.1    Elevated troponin R77.8           Impression and Recommendation:   Generalized weakness with tremors: cause not clear. Concern for medication induced (was on keflex TID)  Lower UTI symptoms  ESRD on HD at home  Will follow cx report: continue iv cefepime and vancomycin. Will adjust antibiotic based on cx report     Discharge Planning (Coordination of out patient care:      Thank you Balbir Jackson, APRN * for allowing me to participate in this patient's care.     Rishabh Clarke MD, MD,FACP 3/8/2023 3:40 PM

## 2023-03-08 NOTE — ED PROVIDER NOTES
[address] [facility] [address] [address] EMERGENCY DEPT      EMERGENCY MEDICINE     Pt Name: Reyes Lilly  MRN: [id number]  Mauriciogfjustin 1960  Date of evaluation: 3/7/2023  Provider: Fiona Jaime MD    CHIEF COMPLAINT       Chief Complaint   Patient presents with    Fatigue    Spasms     HISTORY OF PRESENT ILLNESS   Reyes Lilly is a pleasant 63 y.o. male who presents to the emergency department from from home, by private vehicle for evaluation of generalized fatigue, weakness, and generally feeling unwell. Patient states that this morning he woke up and he felt \"weak \". He states that he was having difficulty having coffee because his hands were shaky. He states this did resolve. He was having difficulty ambulating without having assistance from his wife given his legs felt very shaky. They did do home dialysis today and his wife stated that there was no complications or issues with this. At 4 PM they had to call EMS because he was unable to get up out of his chair to go to the bathroom. At that time he refused transport to the hospital.  However his wife convinced him to come to be evaluated because he was unable to get up and she was concerned. She is unable to lift him also. They deny any fever, chills, nausea, vomiting, diarrhea, cough, or congestion. He did recently have a phimosis repair and cystoscopy on the second. He states he has not had any urine output today and this is unusual for him. PASTMEDICAL HISTORY     Past Medical History:   Diagnosis Date    Arthritis     Back problem     back pain-sees [facility] pain mgmt    Bladder disease     Dr. Rod Sanz    CHF with unknown LVEF ([facility] [address] 05/24/2021    Dr. George/CHF clinic    Chronic kidney disease     Constipation     Diabetes mellitus ([facility] [address]     Dr. Jodee Reddy    Hypertension     Hypertensive emergency 04/11/2019    Hypertensive urgency 04/13/2019    Sleep apnea     has bipap-sees A.  [facility]    Thyroid disease        Patient Active Problem List   Diagnosis Code Allergy to bee sting Z91.030    Obesity, Class III, BMI 40-49.9 (morbid obesity) ([facility]) E66.01    Weight gain R63.5    Primary hypertension I10    Rheumatoid arteritis (HCC) M05.20    Hypothyroidism E03.9    Gastroenteritis K52.9    Obstructive sleep apnea G47.33    Small intestinal bacterial overgrowth K63.89    Acute diarrhea R19.7    Generalized abdominal pain R10.84    Nausea and vomiting R11.2    Hepatomegaly R16.0    Ileus (HCC) K56.7    Hypokalemia E87.6    S/P partial resection of colon Z90.49    S/P laparotomy Z98.890    Acute renal failure superimposed on stage 5 chronic kidney disease, not on chronic dialysis (HCC) N17.9, N18.5    Hypernatremia E87.0    Serum potassium decreased E87.6    Other headache syndrome G44.89    RLS (restless legs syndrome) G25.81    Psychophysiological insomnia F51.04    Angina, class III ([facility]) I20.9    Acute respiratory failure with hypoxia ([facility]) J96.01    Dyspnea R06.00    CHF (NYHA class III, ACC/AHA stage C) ([facility]) I50.9    Nonhealing surgical wound T81.89XA    Diabetes mellitus (HCC) E11.9    CAROLE (acute kidney injury) ([facility] [address] N17.9    Acute hypoxemic respiratory failure ([facility]) J96.01    Acute on chronic congestive heart failure (HCC) I50.9    Stage 3a chronic kidney disease (HCC) N18.31    Anemia of chronic renal failure N18.9, D63.1    CHF (congestive heart failure), NYHA class III, acute on chronic, combined (HCC) I50.43    CHF (congestive heart failure), NYHA class II, chronic, diastolic (HCC) C01.21    Chronic kidney disease, stage IV (severe) (HCC) N18.4    Hyperkalemia E87.5    ATN (acute tubular necrosis) (HCC) N17.0    Stage 4 chronic kidney disease (HCC) N18.4     SURGICAL HISTORY       Past Surgical History:   Procedure Laterality Date    ABDOMEN SURGERY      ABDOMEN SURGERY N/A 3/25/2022    ABDOMINAL LYSIS OF ADHESIONS, EXPLANT OF INFECTED MESH performed by Luther Dickson MD at [address]      no stents    CARPAL TUNNEL RELEASE Bilateral     CIRCUMCISION N/A 3/2/2023    Cystoscopy Circumcision performed by Jair Melissa MD at [facility]  2017    Dr. Jacky García N/A 3/2/2023    CYSTOSCOPY performed by Jair Melissa MD at [address] Left 12/27/2022    LEFT ARM ARM ARTERIO-VENOUS FISTULA CREATION performed by Merline Salm, MD at [facility], [address]      x3 surgeries with 14 repairs    KNEE SURGERY Right 1980's    cartilage    CO EXPLORATORY LAPAROTOMY CELIOTOMY W/WO BIOPSY SPX N/A 2/5/2018    ABDOMINAL EXPLORATION WITH LYSIS OF COMPLICATED ADHESIONS WITH AN EXTENDED RIGHT COLON RESECTION performed by Mavis Lo MD at [address]       Previous Medications    ALBUTEROL SULFATE  (90 BASE) MCG/ACT INHALER    Inhale 2 puffs into the lungs every 6 hours as needed for Wheezing or Shortness of Breath    ASPIRIN EC 81 MG EC TABLET    Take 1 tablet by mouth daily    AZELASTINE (ASTELIN) 137 MCG/SPRAY NASAL SPRAY    1 spray by Nasal route as needed. Use in each nostril as directed    CARVEDILOL (COREG) 25 MG TABLET    Take 25 mg by mouth 2 times daily As need for High BP    CARVEDILOL (COREG) 3.125 MG TABLET    Take 1 tablet by mouth 2 times daily    CPAP MACHINE MISC    by Does not apply route Please change bipap to 18/14 cm H20. DOXEPIN (SINEQUAN) 25 MG CAPSULE    Take 1 capsule by mouth nightly    GABAPENTIN (NEURONTIN) 300 MG CAPSULE    Take 1 capsule by mouth daily for 30 days.     IPRATROPIUM-ALBUTEROL (DUONEB) 0.5-2.5 (3) MG/3ML SOLN NEBULIZER SOLUTION    Inhale 3 mLs into the lungs every 4 hours as needed for Shortness of Breath    LEVOTHYROXINE (SYNTHROID) 175 MCG TABLET    Take 1 tablet by mouth Daily    MELATONIN 3 MG TABS TABLET    Take 1.5 tablets by mouth nightly as needed (Sleep)    METHOXY PEG-EPOETIN BETA (MIRCERA IJ)    Inject 200 mcg into the skin every 14 days    MOMETASONE-FORMOTEROL (DULERA) 100-5 MCG/ACT INHALER    Inhale 2 puffs into the lungs in the morning and at bedtime    MONTELUKAST (SINGULAIR) 10 MG TABLET    Take 10 mg by mouth nightly    NALOXONE 4 MG/0.1ML LIQD NASAL SPRAY    1 spray by Nasal route as needed    NITROGLYCERIN (NITROSTAT) 0.4 MG SL TABLET    Place 1 tablet under the tongue every 5 minutes as needed for Chest pain (up to 3 doses)    OXYCODONE-ACETAMINOPHEN (PERCOCET)  MG PER TABLET    Take 1 tablet by mouth every 8 hours as needed for Pain for up to 30 days. Intended supply: 30 days    ROPINIROLE (REQUIP) 0.5 MG TABLET    take 1 tablet by mouth IN THE AFTERNOON AND 2 TABLETS AT NIGHT. SEVELAMER (RENVELA) 800 MG TABLET    Take 1 tablet by mouth 3 times daily (with meals)    TAMSULOSIN (FLOMAX) 0.4 MG CAPSULE    Take 1 capsule by mouth daily for 10 days For stent pain and passage of small fragments    VITAMIN D PO    Take 1 tablet by mouth daily Pt states doesn't state it on bottle how much       ALLERGIES     is allergic to azithromycin, shellfish-derived products, pcn [penicillins], succinylcholine, sulfa antibiotics, morphine, and tape [adhesive tape]. FAMILY HISTORY     He indicated that his mother is . He indicated that his father is . He indicated that all of his four brothers are alive. He indicated that the status of his paternal grandmother is unknown. He indicated that the status of his neg hx is unknown.        SOCIAL HISTORY       Social History     Tobacco Use    Smoking status: Never    Smokeless tobacco: Current     Types: Chew    Tobacco comments:     quit pipe over 30 yrs ago   Vaping Use    Vaping Use: Never used   Substance Use Topics    Alcohol use: No     Comment: quit 2017    Drug use: No       PHYSICAL EXAM       ED Triage Vitals   BP Temp Temp Source Heart Rate Resp SpO2 Height Weight   23   (!) 155/75 99.6 °F (37.6 °C) Oral 95 18 96 % 6' 1\" (1.854 m) 295 lb (133.8 kg)       Additional Vital Signs:  Vitals:    03/08/23 0155   BP: 118/75   Pulse: 80   Resp: 18   Temp:    SpO2: 97%     Physical Exam  Vitals and nursing note reviewed. Constitutional:       General: He is not in acute distress. Appearance: He is normal weight. He is not ill-appearing. HENT:      Head: Normocephalic and atraumatic. Mouth/Throat:      Mouth: Mucous membranes are moist.      Pharynx: Oropharynx is clear. Eyes:      Extraocular Movements: Extraocular movements intact. Pupils: Pupils are equal, round, and reactive to light. Cardiovascular:      Rate and Rhythm: Normal rate and regular rhythm. Heart sounds: No murmur heard. Pulmonary:      Effort: Pulmonary effort is normal. No respiratory distress. Breath sounds: Normal breath sounds. No decreased breath sounds. Abdominal:      General: Bowel sounds are normal.      Palpations: Abdomen is soft. Tenderness: There is no abdominal tenderness. There is no guarding or rebound. Musculoskeletal:      Cervical back: Neck supple. Right lower leg: No edema. Left lower leg: No edema. Skin:     General: Skin is warm and dry. Capillary Refill: Capillary refill takes less than 2 seconds. Neurological:      General: No focal deficit present. Mental Status: He is alert and oriented to person, place, and time. GCS: GCS eye subscore is 4. GCS verbal subscore is 5. GCS motor subscore is 6. Cranial Nerves: Cranial nerves 2-12 are intact. Sensory: Sensation is intact. Motor: Motor function is intact. No weakness or tremor. FORMAL DIAGNOSTIC RESULTS     RADIOLOGY: Interpretation per the Radiologist below, if available at the time of this note (none if blank):    XR CHEST PORTABLE   Final Result   Cardiomegaly with possible early volume overload. This document has been electronically signed by:  Quintin Cordero MD on 03/08/2023 01:39 AM CT Head W/O Contrast   Final Result   Suspected small old lacunar infarct of the right inferior basal ganglia. Otherwise no acute findings. This document has been electronically signed by: Emmett Shukla MD on 03/08/2023 01:21 AM      All CTs at this facility use dose modulation techniques and iterative    reconstructions, and/or weight-based dosing   when appropriate to reduce radiation to a low as reasonably achievable.           LABS: (none if blank)  Labs Reviewed   CBC WITH AUTO DIFFERENTIAL - Abnormal; Notable for the following components:       Result Value    RBC 3.31 (*)     Hemoglobin 10.6 (*)     Hematocrit 31.0 (*)     RDW-CV 15.8 (*)     RDW-SD 54.4 (*)     MPV 9.1 (*)     Lymphocytes Absolute 0.3 (*)     All other components within normal limits   COMPREHENSIVE METABOLIC PANEL - Abnormal; Notable for the following components:    Creatinine 5.8 (*)     BUN 39 (*)     Sodium 132 (*)     Chloride 92 (*)     Calcium 11.2 (*)     Total Protein 8.4 (*)     ALT <5 (*)     All other components within normal limits   TROPONIN - Abnormal; Notable for the following components:    Troponin T 0.061 (*)     All other components within normal limits   PROTIME-INR - Abnormal; Notable for the following components:    INR 1.30 (*)     All other components within normal limits   LACTIC ACID - Abnormal; Notable for the following components:    Lactic Acid 2.1 (*)     All other components within normal limits   GLOMERULAR FILTRATION RATE, ESTIMATED - Abnormal; Notable for the following components:    Est, Glom Filt Rate 10 (*)     All other components within normal limits   OSMOLALITY - Abnormal; Notable for the following components:    Osmolality Calc 273.8 (*)     All other components within normal limits   COVID-19 & INFLUENZA COMBO   CULTURE, BLOOD 1   CULTURE, BLOOD 1   ANION GAP   URINALYSIS WITH REFLEX TO CULTURE   TROPONIN   POCT GLUCOSE       (Any cultures that may have been sent were not resulted at the time of this patient visit)    [facility] / ED COURSE:     1) Number and Complexity of Problems            Problem List This Visit:         Chief Complaint   Patient presents with    Fatigue    Spasms            Differential Diagnosis includes (but not limited to): COVID, influenza, bacteremia, pneumonia, intracranial mass, stroke, intracranial bleed, electrolyte abnormality        Diagnoses Considered but I have low suspicion of:   ACS, aortic dissection, Guillain-Barré syndrome, UTI,             Pertinent Comorbid Conditions:    ESRD    2)  Data Reviewed (none if left blank)          My Independent interpretations:     EKG:      Sinus rhythm without any ST changes    Imaging: Evidence of acute stroke, or intracranial bleed on CT. Chest x-ray is unchanged from previous    Labs:      ED course                External Documentation Reviewed:         Previous patient encounter documents & history available on EMR was reviewed              See Formal Diagnostic Results above for the lab and radiology tests and orders. 3)  Treatment and Disposition         ED Reassessment: See ED course         Case discussed with consulting clinician: Hospitalist         Shared Decision-Making was performed and disposition discussed with the        Patient/Family and questions answered          Social determinants of health impacting treatment or disposition: None         Code Status: Full      Summary of Patient Presentation:      MDM  Number of Diagnoses or Management Options  Generalized weakness  Other fatigue  Diagnosis management comments: 72-year-old male presents emergency room for 1 day history of fatigue and general malaise. Patient has a normal neuro exam on my bedside assessment however he does appear weak when trying to sit up. Will evaluate with CBC, CMP, troponin, BMP, COVID swab, influenza swab.   He does have high risk of infection given his end-stage renal disease with dialysis diagnosis. Vital signs are all within normal limits.    /  ED Course as of 03/08/23 0203   Tue Mar 07, 2023   2240 His creatinine is at his baseline. Hemoglobin is also at his baseline. Troponin is within standard deviation of his baseline. [DD]   5981 Patient has been concerned that he had not urinated much today. Bladder scan revealed he only had 89 cc of urine. Given that he is a dialysis patient I am less concerned with decreased urine output at this time. [DD]   Wed Mar 08, 2023   0045 We attempted to ambulate the patient as lab work was relatively unremarkable however he was unable to sit up very well. He did not complain of dizziness. We will add on a CT head noncontrast and a chest x-ray. Then I will plan on admitting the patient for generalized weakness. [DD]   18 CT brain shows a suspected old right lacunar infarct in the right basal ganglia. I do not feel this would cause his generalized weakness. [DD]      ED Course User Index  [DD] Yvonne Oswald MD     Vitals Reviewed:    Vitals:    03/07/23 2311 03/08/23 0048 03/08/23 0106 03/08/23 0155   BP:  119/69 112/70 118/75   Pulse:  82 80 80   Resp:  18 18 18   Temp:       TempSrc:       SpO2: 96% 97% 98% 97%   Weight:       Height:           The patient was seen and examined. Appropriate diagnostic testing was performed and results reviewed with the patient. The results of pertinent diagnostic studies and exam findings were discussed. The patients provisional diagnosis and plan of care were discussed with the patient and present family who expressed understanding. Any medications were reviewed and indications and risks of medications were discussed with the patient /family present. Strict verbal and written return precautions, instructions and appropriate follow-up provided to  the patient .      ED Medications administered this visit:  (None if blank)  Medications   ondansetron (ZOFRAN) injection 4 mg (4 mg IntraVENous Given 3/7/23 4099) PROCEDURES: (None if blank)  Procedures:     CRITICAL CARE: (None if blank)      DISCHARGE PRESCRIPTIONS: (None if blank)  New Prescriptions    No medications on file       FINAL IMPRESSION      1. Generalized weakness    2. Other fatigue          DISPOSITION/PLAN   DISPOSITION Decision To Admit 03/08/2023 01:40:07 AM      OUTPATIENT FOLLOW UP THE PATIENT:  No follow-up provider specified.     MD Yvonne Hoff MD  03/08/23 3835

## 2023-03-08 NOTE — CONSULTS
[facility]    [address], [address]  [facility]  3/8/2023 8:30 AM    Pt Name:    Lisa Marie  MRN:     [id number]   [id number]  YOB: 1960  Admit Date:    3/7/2023  9:10 PM  Primary Care Physician:  Aristides Mayberry DO    [facility] Number:   [id number]    Reason for Consult:  ESRD on hemodialysis  Requesting provider:  Hospitalist    History:   The patient is a 63 y.o. pleasant male with history of ESRD on hemodialysis, diabetes, hypertension who presented to the hospital with complaints of generalized weakness, fall. Patient reports that he completed his home dialysis session yesterday but subsequently started to have malaise and generalized weakness. He says that as the day went on his symptoms continued to get worse. He felt extremely weak and tired. Denies any chest pain. Denies any shortness of breath. He reports he had some shivers. No alleviating or aggravating factors. Denies any fever at home. Denies any nausea or vomiting. He reports that he recently was seen by urology 3 days ago and underwent cystoscopy. Reports his dialysis otherwise has been going well. Nephrology consulted for management of ESRD. Past Medical History:  Past Medical History:   Diagnosis Date    Arthritis     Back problem     back pain-sees [facility] pain mgmt    Bladder disease     Dr. Jalen Tilley    CHF with unknown LVEF ([facility] [address] 05/24/2021    Dr. George/CHF clinic    Chronic kidney disease     Constipation     Diabetes mellitus ([facility] [address]     Dr. Adrian Al    Hypertension     Hypertensive emergency 04/11/2019    Hypertensive urgency 04/13/2019    Sleep apnea     has bipap-sees AZRA Brambila CNP    Thyroid disease        Past Surgical History:  Past Surgical History:   Procedure Laterality Date    ABDOMEN SURGERY      ABDOMEN SURGERY N/A 3/25/2022    ABDOMINAL LYSIS OF ADHESIONS, EXPLANT OF INFECTED MESH performed by Brenna Kuhn MD at [address] CATHETERIZATION      no stents    CARPAL TUNNEL RELEASE Bilateral     CIRCUMCISION N/A 3/2/2023    Cystoscopy Circumcision performed by Jame Moses MD at [place] [id number]  2017    Dr. Charlene Carlton 3/2/2023    CYSTOSCOPY performed by Jame Moses MD at [address] Left 12/27/2022    LEFT ARM ARM ARTERIO-VENOUS FISTULA CREATION performed by Charanjit Sen MD at [facility], [address] [place] [address] Rd      x3 surgeries with 14 repairs    KNEE SURGERY Right 1980's    cartilage    MT EXPLORATORY LAPAROTOMY CELIOTOMY W/WO BIOPSY SPX N/A 2/5/2018    ABDOMINAL EXPLORATION WITH LYSIS OF COMPLICATED ADHESIONS WITH AN EXTENDED RIGHT COLON RESECTION performed by Mustapha Lema MD at [place] [address] History:  Family History   Problem Relation Age of Onset    Cancer Mother         breast    Heart Disease Father         bladder, lung    Cancer Father     Stroke Brother     Heart Attack Brother     Prostate Cancer Brother     Arthritis Brother     No Known Problems Brother     No Known Problems Brother     Diabetes Paternal Grandmother     High Blood Pressure Neg Hx        Social History:  Social History     Socioeconomic History    Marital status:      Spouse name: Charanjit Jerson    Number of children: 2    Years of education: Not on file    Highest education level: Not on file   Occupational History    Not on file   Tobacco Use    Smoking status: Never    Smokeless tobacco: Current     Types: Chew    Tobacco comments:     quit pipe over 30 yrs ago   Vaping Use    Vaping Use: Never used   Substance and Sexual Activity    Alcohol use: No     Comment: quit 2017    Drug use: No    Sexual activity: Yes   Other Topics Concern    Not on file   Social History Narrative    Not on file     Social Determinants of Health     Financial Resource Strain: Not on file   Food Insecurity: Not on file   Transportation Needs: Not on file   Physical Activity: Not on file   Stress: Not on file   Social Connections: Not on file   Intimate Partner Violence: Not on file   Housing Stability: Not on file       Home Meds:  Prior to Admission medications    Medication Sig Start Date End Date Taking? Authorizing Provider   tamsulosin (FLOMAX) 0.4 MG capsule Take 1 capsule by mouth daily for 10 days For stent pain and passage of small fragments 3/2/23 3/12/23  Sarah Taylor MD   sevelamer (RENVELA) 800 MG tablet Take 1 tablet by mouth 3 times daily (with meals)    Historical Provider, MD   oxyCODONE-acetaminophen (PERCOCET)  MG per tablet Take 1 tablet by mouth every 8 hours as needed for Pain for up to 30 days. Intended supply: 30 days 2/17/23 3/19/23  SAHIL Dawkins CNP   doxepin [facility]) 25 MG capsule Take 1 capsule by mouth nightly 2/10/23   SAHIL Hahn CNP   gabapentin (NEURONTIN) 300 MG capsule Take 1 capsule by mouth daily for 30 days. Patient taking differently: Take 300 mg by mouth nightly.  2/10/23 3/12/23  SAHIL Dawkins CNP   Methoxy PEG-Epoetin Beta (MIRCERA IJ) Inject 200 mcg into the skin every 14 days 12/30/22   Historical Provider, MD   mometasone-formoterol [facility]) 100-5 MCG/ACT inhaler Inhale 2 puffs into the lungs in the morning and at bedtime 8/18/22   Historical Provider, MD   montelukast (SINGULAIR) 10 MG tablet Take 10 mg by mouth nightly 10/26/22   Historical Provider, MD   naloxone 4 MG/0.1ML LIQD nasal spray 1 spray by Nasal route as needed  Patient not taking: Reported on 3/8/2023 12/27/22   Historical Provider, MD   carvedilol (COREG) 25 MG tablet Take 25 mg by mouth 2 times daily As need for High BP 1/9/23   Historical Provider, MD   carvedilol (COREG) 3.125 MG tablet Take 1 tablet by mouth 2 times daily  Patient not taking: Reported on 2/23/2023 1/5/23 2/23/23  Peg Zamarripa MD   VITAMIN D PO Take 1 tablet by mouth daily Pt states doesn't state it on bottle how much    Historical Provider, MD   rOPINIRole (REQUIP) 0.5 MG tablet take 1 tablet by mouth IN THE AFTERNOON AND 2 TABLETS AT NIGHT. Patient taking differently: Take 0.5 mg by mouth nightly take 1 tablet by mouth IN THE AFTERNOON AND 2 TABLETS AT NIGHT. 11/1/22   Evelyne Mast LIVAN Diaz   potassium chloride (K-TAB) 10 MEQ extended release tablet Take 2 tablets by mouth in the morning. 7/22/22 8/17/22  Nat Almeida MD   bumetanide (BUMEX) 1 MG tablet Take 1 tablet by mouth in the morning and 1 tablet before bedtime. 7/22/22 8/17/22  Nat Almeida MD   CPAP Machine MISC by Does not apply route Please change bipap to 18/14 cm H20. 5/31/22   Ephriam LIVAN Callahan   levothyroxine (SYNTHROID) 175 MCG tablet Take 1 tablet by mouth Daily 3/31/22   Eloina Vargas, DO   melatonin 3 MG TABS tablet Take 1.5 tablets by mouth nightly as needed (Sleep) 3/30/22 3/2/23  Eloina Vargas, DO   amLODIPine (NORVASC) 10 MG tablet Take 1 tablet by mouth daily  Patient not taking: No sig reported 3/31/22 8/17/22  Eloina Vargas, DO   ipratropium-albuterol (DUONEB) 0.5-2.5 (3) MG/3ML SOLN nebulizer solution Inhale 3 mLs into the lungs every 4 hours as needed for Shortness of Breath 3/21/22   Madison Zambrano, DO   albuterol sulfate  (90 Base) MCG/ACT inhaler Inhale 2 puffs into the lungs every 6 hours as needed for Wheezing or Shortness of Breath 2/15/22   SAHIL Muir - CNP   aspirin EC 81 MG EC tablet Take 1 tablet by mouth daily 9/10/21   SAHIL Rees - CNP   nitroGLYCERIN (NITROSTAT) 0.4 MG SL tablet Place 1 tablet under the tongue every 5 minutes as needed for Chest pain (up to 3 doses) 6/7/21   SAHIL Rees - CNP   glimepiride (AMARYL) 4 MG tablet Take 4 mg by mouth daily  1/16/21 8/17/22  Historical Provider, MD   azelastine (ASTELIN) 137 MCG/SPRAY nasal spray 1 spray by Nasal route as needed.  Use in each nostril as directed  Patient not taking: Reported on 3/8/2023    Historical Provider, MD       Review of Systems:  Constitutional: Positive for generalized weakness, fatigue, nausea  Head: Negative for headaches  Eyes: Negative for blurry vision or discharge  Ears: Negative for ear pain or hearing changes  Nose: Negative for runny nose or epistaxis  Respiratory: Negative for shortness of breath. Negative for cough or sputum production. Negative for hemoptysis  Cardiovascular: Negative for chest pain  GI: Positive for nausea. Negative for vomiting or diarrhea. Negative for hematochezia and melena  Skin: Negative for rash  Musculoskeletal: Negative for joint pain, moves all ext    All other review of systems were reviewed and negative    Current Meds:  Infusion:    sodium chloride 20 mL/hr at 03/08/23 0812    dextrose       Meds:    sodium chloride flush  10 mL IntraVENous 2 times per day    heparin (porcine)  5,000 Units SubCUTAneous 3 times per day    aspirin EC  81 mg Oral Daily    carvedilol  25 mg Oral BID with meals    doxepin  25 mg Oral Nightly    gabapentin  300 mg Oral Nightly    mometasone-formoterol  2 puff Inhalation BID    montelukast  10 mg Oral Nightly    rOPINIRole  0.5 mg Oral Nightly    sevelamer  800 mg Oral TID WC    tamsulosin  0.4 mg Oral Daily    levothyroxine  175 mcg Oral Daily    [START ON 3/9/2023] cefepime  1,000 mg IntraVENous Q24H    vancomycin (VANCOCIN) intermittent dosing (placeholder)   Other RX Placeholder    vancomycin  2,500 mg IntraVENous Once    insulin lispro  0-4 Units SubCUTAneous TID WC    insulin lispro  0-4 Units SubCUTAneous Nightly     Meds prn: sodium chloride flush, sodium chloride, ondansetron **OR** ondansetron, polyethylene glycol, acetaminophen **OR** acetaminophen, melatonin, nitroGLYCERIN, oxyCODONE-acetaminophen, HYDROmorphone **OR** HYDROmorphone, albuterol sulfate HFA, glucose, dextrose bolus **OR** dextrose bolus, glucagon (rDNA), dextrose     Allergies/Intolerances:   ALLERGIES: Azithromycin, Shellfish-derived products, Pcn [penicillins], Succinylcholine, Sulfa antibiotics, Morphine, and Tape [adhesive tape]    24HR INTAKE/OUTPUT:  No intake or output data in the 24 hours ending 03/08/23 0830  No intake/output data recorded. No intake/output data recorded. Admission weight: 295 lb (133.8 kg)  Wt Readings from Last 3 Encounters:   03/07/23 295 lb (133.8 kg)   03/02/23 300 lb (136.1 kg)   02/10/23 (!) 305 lb 9.6 oz (138.6 kg)     Body mass index is 38.92 kg/m². Physical Examination:  VITALS:   Vitals:    03/08/23 0305 03/08/23 0415 03/08/23 0509 03/08/23 0800   BP: 130/80 112/78  117/64   Pulse: 76 81  73   Resp: 18 18 18 18   Temp:  98.4 °F (36.9 °C)  98.4 °F (36.9 °C)   TempSrc:  Oral  Oral   SpO2: 98% 98%  98%   Weight:       Height:         Weight:   Wt Readings from Last 3 Encounters:   03/07/23 295 lb (133.8 kg)   03/02/23 300 lb (136.1 kg)   02/10/23 (!) 305 lb 9.6 oz (138.6 kg)     Constitutional and General Appearance: alert and cooperative with exam, appears comfortable, no distress, not diaphoretic  Eyes: no icteric sclera in left eye or right eye,  no pallor conjunctiva in left or right eye, no discharge seen from left eye or right eye  Ears and Nose: normal external appearance of left and right ear. No active drainage from nose. Oral: moist oral mucus membranes  Neck: No jugular venous distention, appears symmetric, good ROM  Lungs: Air entry B/L, no crackles or rales, no use of accessory muscles or labored breathing  Chest: Tunneled dialysis catheter in place. Dressing clean. No erythema or discharge noted.   Heart: regular rate, S1, S2  Extremities: No significant LE edema, no tenderness  GI: soft, non-tender, no guarding, no distention  Skin: no rash seen on exposed extremities, warm to touch  Musculo: moves all extremities  Neuro: no slurred speech, no facial drooping  Psychiatric: Normal mood and affect, Not agitated  Left arm AV fistula with thrill and bruit present    Lab Data  CBC:   Recent Labs     03/07/23  2131   WBC 5.3   HGB 10.6*   HCT 31.0*        BMP:  Recent Labs     03/07/23  9283 *   K 4.1   CL 92*   CO2 29   BUN 39*   CREATININE 5.8*   GLUCOSE 96   CALCIUM 11.2*     Hepatic:   Recent Labs     03/07/23  2131   LABALBU 4.1   AST 18   ALT <5*   BILITOT 0.4   ALKPHOS 69         Additional Labs:  Diagnostics: Chest x-ray shows cardiomegaly    Old tests reviewed. Echo: EF 55%    Impression and Plan:  ESRD on hemodialysis. Patient is on home hemodialysis treatments. Patient recently started cannulations of his left upper extremity AV fistula. Hence catheter has been in place. At this time I do not see any obvious evidence of infection around the catheter site. No leukocytosis. We will follow blood cultures. Plan hemodialysis treatment today. Generalized weakness  Anemia in ESRD. H&H stable  Hypercalcemia. Will check PTH and vitamin D level. Will utilize low calcium bath for today  Recent cystoscopy  BMD/hyperphosphatemia. Continue with phosphate binders  Medications reviewed    Thank you for the consult. Please feel free to call me if you have any questions. Lisa Alves MD  [facility]    This report has been created using voice recognition software. It may contain minor errors which are inherent in voice recognition technology.

## 2023-03-08 NOTE — H&P
Hospitalist History and Physical          Patient: Macie Ocampo  : 1960  MRN: [id number]     Acct: [de-identified]    PCP: Darren Roth DO  Date of Admission: 3/7/2023  Date of Service: Pt seen/examined on 23  and Admitted to Inpatient with expected LOS greater than two midnights due to medical therapy. Assessment and Plan:    Generalized weakness 2/2 possible sepsis  SIRS criteria not met on documented vitals however patient appears ill   And presentation consistent with dialysis catheter associated infection  Treat empirically cefepime and vanc- de-escalate when able  Blood cultures x2, culture cathter and catheter site  PT/OT consult to address any deconditioning worsening weakness  Will also check TSH     ESRD on home HD  Nephrology consulted   Strict I and O's, daily weights  Avoid unnecessary nephro strenuous agents   Renal diet; sevelamer    Acute on chronic back pain   Patient having 9/10 pain in lumbar spine different and more severe than his chronic pain   Radiates bilaterally - denies any associated numbness or tingling   Dialudid pain panel more CKD friendly narcotic- de-escalate to PO when able    Type II diabetes  PO meds held   Low SSI  Hypoglycemia protocol    Elevated troponin  0.061 trending down - most likely from ESRD  No chest pain endorsed     Primary hypertension  Coreg     Hypothyroidism   synthroid    =======================================================================      Chief Complaint:  malaise, weakness    History Of Present Illness:  Macie Ocampo is a 63 y.o. male with PMHx of see above who presents to [facility] with malaise and generalized weakness. Patient performs HD at home for ESRD. He completed a session in the morning with no issues or complications he could recall however as the day went on he felt progressively weaker and run down.  He states he became so weak he could not get out if his chair and eventually decided to come to the ED. He states he has been having chills and subjective fevers as the day went on. He endorses a dry cough in addition to his other symptoms. He states he has acute back pain more severe and different than his chronic pain but denies any numbness and tingling. He states he was also shaking when he tried to hold an object or drink a cup of coffee. Past Medical History:        Diagnosis Date    Arthritis     Back problem     back pain-sees [facility] pain mgmt    Bladder disease     Dr. Selwyn Almanzar    CHF with unknown LVEF ([facility] [address] 05/24/2021    Dr. George/CHF clinic    Chronic kidney disease     Constipation     Diabetes mellitus ([facility] [address]     Dr. Garcia Said    Hypertension     Hypertensive emergency 04/11/2019    Hypertensive urgency 04/13/2019    Sleep apnea     has bipap-sees AZRA Hollins CNP    Thyroid disease        Past Surgical History:        Procedure Laterality Date    ABDOMEN SURGERY      ABDOMEN SURGERY N/A 3/25/2022    ABDOMINAL LYSIS OF ADHESIONS, EXPLANT OF INFECTED MESH performed by Akshat Chatterjee MD at [address]      no stents    CARPAL TUNNEL RELEASE Bilateral     CIRCUMCISION N/A 3/2/2023    Cystoscopy Circumcision performed by Thad Gonzales MD at [address] [facility]  2017    Dr. Seng Rincon N/A 3/2/2023    CYSTOSCOPY performed by Thad Gonzales MD at [address] Left 12/27/2022    LEFT ARM ARM ARTERIO-VENOUS FISTULA CREATION performed by Arun Wilder MD at [facility], [address]      x3 surgeries with 14 repairs    KNEE SURGERY Right 1980's    cartilage    IA EXPLORATORY LAPAROTOMY CELIOTOMY W/WO BIOPSY SPX N/A 2/5/2018    ABDOMINAL EXPLORATION WITH LYSIS OF COMPLICATED ADHESIONS WITH AN EXTENDED RIGHT COLON RESECTION performed by Akshat Chatterjee MD at [facility]       Medications Prior to Admission:   Prior to Admission medications    Medication Sig Start Date End Date Taking?  Authorizing Provider tamsulosin (FLOMAX) 0.4 MG capsule Take 1 capsule by mouth daily for 10 days For stent pain and passage of small fragments 3/2/23 3/12/23  Damian Small MD   sevelamer (RENVELA) 800 MG tablet Take 1 tablet by mouth 3 times daily (with meals)    Historical Provider, MD   oxyCODONE-acetaminophen (PERCOCET)  MG per tablet Take 1 tablet by mouth every 8 hours as needed for Pain for up to 30 days. Intended supply: 30 days 2/17/23 3/19/23  SAHIL Reilly CNP   doxepin [facility]) 25 MG capsule Take 1 capsule by mouth nightly 2/10/23   SAHIL Rider CNP   gabapentin (NEURONTIN) 300 MG capsule Take 1 capsule by mouth daily for 30 days. Patient taking differently: Take 300 mg by mouth nightly. 2/10/23 3/12/23  SAHIL Reilly CNP   Methoxy PEG-Epoetin Beta (MIRCERA IJ) Inject 200 mcg into the skin every 14 days 12/30/22   Historical Provider, MD   mometasone-formoterol [facility]) 100-5 MCG/ACT inhaler Inhale 2 puffs into the lungs in the morning and at bedtime 8/18/22   Historical Provider, MD   montelukast (SINGULAIR) 10 MG tablet Take 10 mg by mouth nightly 10/26/22   Historical Provider, MD   naloxone 4 MG/0.1ML LIQD nasal spray 1 spray by Nasal route as needed 12/27/22   Historical Provider, MD   carvedilol (COREG) 25 MG tablet Take 25 mg by mouth 2 times daily As need for High BP 1/9/23   Historical Provider, MD   carvedilol (COREG) 3.125 MG tablet Take 1 tablet by mouth 2 times daily  Patient not taking: Reported on 2/23/2023 1/5/23 2/23/23  Abdelrahman Goodwin MD   VITAMIN D PO Take 1 tablet by mouth daily Pt states doesn't state it on bottle how much    Historical Provider, MD   rOPINIRole (REQUIP) 0.5 MG tablet take 1 tablet by mouth IN THE AFTERNOON AND 2 TABLETS AT NIGHT. Patient taking differently: Take 0.5 mg by mouth nightly take 1 tablet by mouth IN THE AFTERNOON AND 2 TABLETS AT NIGHT.  11/1/22   Linda Diaz PA-C   potassium chloride (K-TAB) 10 MEQ extended release tablet Take 2 tablets by mouth in the morning. 7/22/22 8/17/22  Zaki Reaves MD   bumetanide (BUMEX) 1 MG tablet Take 1 tablet by mouth in the morning and 1 tablet before bedtime. 7/22/22 8/17/22  Zaki Reaves MD   CPAP Machine MISC by Does not apply route Please change bipap to 18/14 cm H20. 5/31/22   Luis Esteves PA-C   levothyroxine (SYNTHROID) 175 MCG tablet Take 1 tablet by mouth Daily 3/31/22   Princella Median Alicia, DO   melatonin 3 MG TABS tablet Take 1.5 tablets by mouth nightly as needed (Sleep) 3/30/22 3/2/23  Princella Median Alicia, DO   amLODIPine (NORVASC) 10 MG tablet Take 1 tablet by mouth daily  Patient not taking: No sig reported 3/31/22 8/17/22  Princella Median Alicia, DO   ipratropium-albuterol (DUONEB) 0.5-2.5 (3) MG/3ML SOLN nebulizer solution Inhale 3 mLs into the lungs every 4 hours as needed for Shortness of Breath 3/21/22   Darus Lard, DO   albuterol sulfate  (90 Base) MCG/ACT inhaler Inhale 2 puffs into the lungs every 6 hours as needed for Wheezing or Shortness of Breath 2/15/22   SAHIL Kaiser CNP   aspirin EC 81 MG EC tablet Take 1 tablet by mouth daily 9/10/21   SAHIL Horner CNP   nitroGLYCERIN (NITROSTAT) 0.4 MG SL tablet Place 1 tablet under the tongue every 5 minutes as needed for Chest pain (up to 3 doses) 6/7/21   SAHIL Horner CNP   glimepiride (AMARYL) 4 MG tablet Take 4 mg by mouth daily  1/16/21 8/17/22  Historical Provider, MD   azelastine (ASTELIN) 137 MCG/SPRAY nasal spray 1 spray by Nasal route as needed. Use in each nostril as directed    Historical Provider, MD       Allergies:  Azithromycin, Shellfish-derived products, Pcn [penicillins], Succinylcholine, Sulfa antibiotics, Morphine, and Tape [adhesive tape]    Social History:    The patient currently lives at home. Tobacco use:   reports that he has never smoked. His smokeless tobacco use includes chew. Alcohol use:   reports no history of alcohol use. Drug use:  reports no history of drug use.      Family History:   as follows:      Problem Relation Age of Onset    Cancer Mother         breast    Heart Disease Father         bladder, lung    Cancer Father     Stroke Brother     Heart Attack Brother     Prostate Cancer Brother     Arthritis Brother     No Known Problems Brother     No Known Problems Brother     Diabetes Paternal Grandmother     High Blood Pressure Neg Hx        Review of Systems:   Pertinent positives and negatives as noted in the HPI. Otherwise complete ROS negative. Physical Exam:    /75   Pulse 80   Temp 99.6 °F (37.6 °C) (Oral)   Resp 18   Ht 6' 1\" (1.854 m)   Wt 295 lb (133.8 kg)   SpO2 97%   BMI 38.92 kg/m²       General appearance: ill appearing, appears stated age. Eyes:  Pupils equal, round, and reactive to light. Conjunctivae/corneas clear. HENT: Head normal in appearance. External nares normal.  Oral mucosa moist without lesions. Hearing grossly intact. Neck: Supple, with full range of motion. Trachea midline. No gross JVD appreciated. Respiratory:  Normal respiratory effort. Clear to auscultation, bilaterally without rales or wheezes or rhonchi. Cardiovascular: Normal rate, regular rhythm with normal S1/S2 without murmurs. No lower extremity edema. Abdomen: Soft, non-tender, non-distended with normal bowel sounds. Musculoskeletal: catheter on right side of chest   No joint swelling or tenderness. Normal tone. No abnormal movements. Skin: Warm and dry. No rashes or lesions. Neurologic:  No focal sensory/motor deficits in the upper and lower extremities. Cranial nerves:  grossly non-focal 2-12. Psychiatric: Alert and oriented, normal insight and thought content. Capillary Refill: Brisk,< 3 seconds. Peripheral Pulses: +2 palpable, equal bilaterally.          Labs:     Recent Labs     03/07/23  2131   WBC 5.3   HGB 10.6*   HCT 31.0*        Recent Labs     03/07/23  2131   *   K 4.1   CL 92*   CO2 29   BUN 39*   CREATININE 5.8*   CALCIUM 11.2* Recent Labs     03/07/23  2131   AST 18   ALT <5*   BILITOT 0.4   ALKPHOS 69     Recent Labs     03/07/23  2218   INR 1.30*     No results for input(s): Tariq Wilson in the last 72 hours. Lab Results   Component Value Date/Time    NITRU NEGATIVE 03/23/2022 04:45 AM    WBCUA 5-9 03/23/2022 04:45 AM    BACTERIA NONE SEEN 03/23/2022 04:45 AM    RBCUA 25-50 03/23/2022 04:45 AM    BLOODU MODERATE 03/23/2022 04:45 AM    SPECGRAV 1.015 03/23/2022 04:45 AM    GLUCOSEU 500 04/13/2021 08:46 AM         Radiology:     XR CHEST PORTABLE   Final Result   Cardiomegaly with possible early volume overload. This document has been electronically signed by: Ivan Delacruz MD on 03/08/2023 01:39 AM      CT Head W/O Contrast   Final Result   Suspected small old lacunar infarct of the right inferior basal ganglia. Otherwise no acute findings. This document has been electronically signed by: Ivan Delacruz MD on 03/08/2023 01:21 AM      All CTs at this facility use dose modulation techniques and iterative    reconstructions, and/or weight-based dosing   when appropriate to reduce radiation to a low as reasonably achievable. PT/OT Eval Status:  will be assessed  Diet: No diet orders on file  DVT prophylaxis: heparin  Code Status: Prior  Disposition: admit to 8B     Thank you Rishabh Harrington DO for the opportunity to be involved in this patient's care.     Electronically signed by SAMANTHA Noble on 3/8/2023 at 2:23 AM.

## 2023-03-08 NOTE — RT PROTOCOL NOTE
RT Inhaler-Nebulizer Bronchodilator Protocol Note    There is a bronchodilator order in the chart from a provider indicating to follow the RT Bronchodilator Protocol and there is an Initiate RT Inhaler-Nebulizer Bronchodilator Protocol order as well (see protocol at bottom of note). CXR Findings:  XR CHEST PORTABLE    Result Date: 3/8/2023  Cardiomegaly with possible early volume overload. This document has been electronically signed by: Tono Betts MD on 03/08/2023 01:39 AM      The findings from the last RT Protocol Assessment were as follows:   History Pulmonary Disease: None or smoker <15 pack years  Respiratory Pattern: Regular pattern and RR 12-20 bpm  Breath Sounds: Slightly diminished and/or crackles  Cough: Strong, spontaneous, non-productive  Indication for Bronchodilator Therapy: Decreased or absent breath sounds  Bronchodilator Assessment Score: 2    Aerosolized bronchodilator medication orders have been revised according to the RT Inhaler-Nebulizer Bronchodilator Protocol below. Respiratory Therapist to perform RT Therapy Protocol Assessment initially then follow the protocol. Repeat RT Therapy Protocol Assessment PRN for score 0-3 or on second treatment, BID, and PRN for scores above 3. No Indications - adjust the frequency to every 6 hours PRN wheezing or bronchospasm, if no treatments needed after 48 hours then discontinue using Per Protocol order mode. If indication present, adjust the RT bronchodilator orders based on the Bronchodilator Assessment Score as indicated below. Use Inhaler orders unless patient has one or more of the following: on home nebulizer, not able to hold breath for 10 seconds, is not alert and oriented, cannot activate and use MDI correctly, or respiratory rate 25 breaths per minute or more, then use the equivalent nebulizer order(s) with same Frequency and PRN reasons based on the score.   If a patient is on this medication at home then do not decrease Frequency below that used at home. 0-3 - enter or revise RT bronchodilator order(s) to equivalent RT Bronchodilator order with Frequency of every 4 hours PRN for wheezing or increased work of breathing using Per Protocol order mode. 4-6 - enter or revise RT Bronchodilator order(s) to two equivalent RT bronchodilator orders with one order with BID Frequency and one order with Frequency of every 4 hours PRN wheezing or increased work of breathing using Per Protocol order mode. 7-10 - enter or revise RT Bronchodilator order(s) to two equivalent RT bronchodilator orders with one order with TID Frequency and one order with Frequency of every 4 hours PRN wheezing or increased work of breathing using Per Protocol order mode. 11-13 - enter or revise RT Bronchodilator order(s) to one equivalent RT bronchodilator order with QID Frequency and an Albuterol order with Frequency of every 4 hours PRN wheezing or increased work of breathing using Per Protocol order mode. Greater than 13 - enter or revise RT Bronchodilator order(s) to one equivalent RT bronchodilator order with every 4 hours Frequency and an Albuterol order with Frequency of every 2 hours PRN wheezing or increased work of breathing using Per Protocol order mode. RT to enter RT Home Evaluation for COPD & MDI Assessment order using Per Protocol order mode.     Electronically signed by James Larson RCP on 3/8/2023 at 4:30 AM

## 2023-03-08 NOTE — ED NOTES
Pt provided with a warm blanket at this time. Pt placed on 3L nasal cannula per request from pt stating that is what he wears while he sleeps. Pt denies any other needs at this time.      Jacqueline Colbert RN  03/08/23 0224

## 2023-03-08 NOTE — FLOWSHEET NOTE
Stable 3 hour treatment complete. Removed 1 liter of fluid as per order. Tolerated fluid removal well. HD catheter ports flushed, clamped and capped. Dressing clean, dry and intact. Report given to primary RN. Treatment record printed for scanning into EMR.    03/08/23 1055 03/08/23 1416   Vital Signs   /78 119/66   Temp 97.5 °F (36.4 °C) 97.9 °F (36.6 °C)   Heart Rate 72 75   Resp 19 19   SpO2 97 %  --    Weight 295 lb 10.2 oz (134.1 kg) 293 lb 6.9 oz (133.1 kg)   Weight Method Bed scale Bed scale   Percent Weight Change 0.22 -0.75   Post-Hemodialysis Assessment   Post-Treatment Procedures  --  Blood returned;Catheter capped, clamped and heparinized x 2 ports   Machine Disinfection Process  --  Acid/Vinegar Clean;Heat Disinfect; Exterior Machine Disinfection   Blood Volume Processed (Liters)  --  60.9 l/min   Dialyzer Clearance  --  Lightly streaked   Duration of Treatment (minutes)  --  180 minutes   Heparin Amount Administered During Treatment (mL)  --  0 mL   Hemodialysis Intake (ml)  --  400 ml   Hemodialysis Output (ml)  --  1400 ml   NET Removed (ml)  --  1000   Tolerated Treatment  --  Good

## 2023-03-08 NOTE — PROGRESS NOTES
Pharmacy Renal Adjustment Per P&T Protocol    Karli Ricks is a 63 y.o. male. Pharmacy has renally adjusted cefepime per P&T Protocol. Recent Labs     03/07/23 2131   BUN 39*       Recent Labs     03/07/23 2131   CREATININE 5.8*       Estimated Creatinine Clearance: 19 mL/min (A) (based on SCr of 5.8 mg/dL [facility])). Height:   Ht Readings from Last 1 Encounters:   03/07/23 6' 1\" (1.854 m)     Weight:  Wt Readings from Last 1 Encounters:   03/07/23 295 lb (133.8 kg)       Plan: Adjust the following medications based on renal function:           Cefepime to be 2000mg x 1 then 1000mg q24h for HD patients    Zoie Lazar  3/8/2023  5:09 AM

## 2023-03-08 NOTE — RT PROTOCOL NOTE
RT Inhaler-Nebulizer Bronchodilator Protocol Note    There is a bronchodilator order in the chart from a provider indicating to follow the RT Bronchodilator Protocol and there is an Initiate RT Inhaler-Nebulizer Bronchodilator Protocol order as well (see protocol at bottom of note). CXR Findings:  XR CHEST PORTABLE    Result Date: 3/8/2023  Cardiomegaly with possible early volume overload. This document has been electronically signed by: Iris Lopez MD on 03/08/2023 01:39 AM      The findings from the last RT Protocol Assessment were as follows:   History Pulmonary Disease: None or smoker <15 pack years  Respiratory Pattern: Regular pattern and RR 12-20 bpm  Breath Sounds: Slightly diminished and/or crackles  Cough: Strong, spontaneous, non-productive  Indication for Bronchodilator Therapy: On home bronchodilators PRN  Bronchodilator Assessment Score: 2    Aerosolized bronchodilator medication orders have been revised according to the RT Inhaler-Nebulizer Bronchodilator Protocol below. Respiratory Therapist to perform RT Therapy Protocol Assessment initially then follow the protocol. Repeat RT Therapy Protocol Assessment PRN for score 0-3 or on second treatment, BID, and PRN for scores above 3. No Indications - adjust the frequency to every 6 hours PRN wheezing or bronchospasm, if no treatments needed after 48 hours then discontinue using Per Protocol order mode. If indication present, adjust the RT bronchodilator orders based on the Bronchodilator Assessment Score as indicated below. Use Inhaler orders unless patient has one or more of the following: on home nebulizer, not able to hold breath for 10 seconds, is not alert and oriented, cannot activate and use MDI correctly, or respiratory rate 25 breaths per minute or more, then use the equivalent nebulizer order(s) with same Frequency and PRN reasons based on the score.   If a patient is on this medication at home then do not decrease Frequency below that used at home. 0-3 - enter or revise RT bronchodilator order(s) to equivalent RT Bronchodilator order with Frequency of every 4 hours PRN for wheezing or increased work of breathing using Per Protocol order mode. 4-6 - enter or revise RT Bronchodilator order(s) to two equivalent RT bronchodilator orders with one order with BID Frequency and one order with Frequency of every 4 hours PRN wheezing or increased work of breathing using Per Protocol order mode. 7-10 - enter or revise RT Bronchodilator order(s) to two equivalent RT bronchodilator orders with one order with TID Frequency and one order with Frequency of every 4 hours PRN wheezing or increased work of breathing using Per Protocol order mode. 11-13 - enter or revise RT Bronchodilator order(s) to one equivalent RT bronchodilator order with QID Frequency and an Albuterol order with Frequency of every 4 hours PRN wheezing or increased work of breathing using Per Protocol order mode. Greater than 13 - enter or revise RT Bronchodilator order(s) to one equivalent RT bronchodilator order with every 4 hours Frequency and an Albuterol order with Frequency of every 2 hours PRN wheezing or increased work of breathing using Per Protocol order mode. RT to enter RT Home Evaluation for COPD & MDI Assessment order using Per Protocol order mode.     Electronically signed by Ariella Carrington RCP on 3/8/2023 at 10:18 AM

## 2023-03-08 NOTE — PROGRESS NOTES
[address] Pharmacokinetic Monitoring Service - Vancomycin     Maria Esther Kothari is a 63 y.o. male starting on vancomycin therapy for suspect dialysis catheter associated infection. Pharmacy consulted by Myriam SINGH for monitoring and adjustment. Target Concentration: Goal AUC/LAMAR 400-600 mg*hr/L    Additional Antimicrobials: cefepime    Pertinent Laboratory Values: Wt Readings from Last 1 Encounters:   03/07/23 295 lb (133.8 kg)     Temp Readings from Last 1 Encounters:   03/08/23 98.4 °F (36.9 °C) (Oral)     Estimated Creatinine Clearance: 19 mL/min (A) (based on SCr of 5.8 mg/dL [facility])). Recent Labs     03/07/23  2131   CREATININE 5.8*   WBC 5.3     Procalcitonin: none listed    Pertinent Cultures:  Culture Date Source Results   3/8/23 MRSA    3/8/23 Blood x 2    MRSA Nasal Swab: N/A. Non-respiratory infection.     Plan:  Concentration-guided dosing due to renal impairment/insufficiency  Start vancomycin 2500mg x 1 (max dose for loading)  Renal labs as indicated   Vancomycin concentration ordered for 3/9 at 0600 (random)  Pharmacy will continue to monitor patient and adjust therapy as indicated    Thank you for the consult,  Tristian Bright [facility]  3/8/2023 5:12 AM

## 2023-03-08 NOTE — ED NOTES
ED to inpatient nurses report    Chief Complaint   Patient presents with    Fatigue    Spasms      Present to ED from home  LOC: alert and orientated to name, place, date  Vital signs   Vitals:    03/08/23 0106 03/08/23 0155 03/08/23 0245 03/08/23 0305   BP: 112/70 118/75  130/80   Pulse: 80 80  76   Resp: 18 18 18 18   Temp:       TempSrc:       SpO2: 98% 97%  98%   Weight:       Height:          Oxygen Baseline 0 L / Cpap at night    Current needs required 2 L   LDAs:   Peripheral IV 03/07/23 Right Antecubital (Active)   Site Assessment Clean, dry & intact 03/07/23 2309   Line Status Flushed 03/07/23 2309   Phlebitis Assessment No symptoms 03/07/23 2131   Infiltration Assessment 0 03/07/23 2131   Dressing Status New dressing applied;Clean, dry & intact 03/07/23 2131   Dressing Type Transparent 03/07/23 2131     Mobility: Requires assistance * 1  Pending ED orders: none  Present condition: stable      Electronically signed by Freddy Malcolm RN on 3/8/2023 at 3:26 AM      Andressa Gillette RN  03/08/23 3905

## 2023-03-08 NOTE — PROGRESS NOTES
Hospitalist Progress Note    Patient:  Tomi Moncada      Unit/Bed:8B-29/029-A    YOB: 1960    MRN: [id number]       Acct: [de-identified]     PCP: Pelon Alva DO    Date of Admission: 3/7/2023    Assessment/Plan:    Generalized weakness--afebrile; normal white count and differential; fully alert and oriented; plan HD; did have a recent cystoscopy and circumcision on March 5, 2023; received vancomycin on 3/8; Maxipime to start on 3/9; will consult infectious disease to see if antibiotics are needed or possibly secondary to #3  Possible early volume overload noted on chest x-ray from 3/8/2023--currently in HD  Recent cystoscopy, circumcision, dorsal penile nerve block on March 5, 2023 with Dr. Michelle Mixon  End-stage renal disease on home HD--appreciate nephrology input, I saw the patient receiving inpatient HD  Hyponatremia--mild, monitor  Lactic acidosis--resolved  Diabetes mellitus type 2, uncontrolled  Primary hypertension, uncontrolled  Hypothyroidism--TSH at 3.970  LAURIE--reportedly has BiPAP  Chronic normocytic anemia--stable  Chronic low back pain  Chronically elevated troponin levels  Old lacunar infarct of the right inferior basal ganglia  Obesity with BMI 39.00        Expected discharge date: Pending clinical course    Disposition:    [x] Home       [] TCU       [] Rehab       [] Psych       [] SNF       [] [place]       [] Other-    Chief Complaint: malaise, weakness    Hospital Course: Per H&P from 3/8/2023: [employer] [facility] SALEEM Edwards is a 63 y.o. male with PMHx of see above who presents to [address] [place] [address] with malaise and generalized weakness. Patient performs HD at home for ESRD. He completed a session in the morning with no issues or complications he could recall however as the day went on he felt progressively weaker and run down. He states he became so weak he could not get out if his chair and eventually decided to come to the ED.  He states he has been having chills and subjective fevers as the day went on. He endorses a dry cough in addition to his other symptoms. He states he has acute back pain more severe and different than his chronic pain but denies any numbness and tingling. He states he was also shaking when he tried to hold an object or drink a cup of coffee. \"    3/8--> hemodynamically stable, did have an isolated O2 sat of 85% but nothing further and was actually 96% on room air when he came in; patient was seen in HD; old records reviewed    Subjective (past 24 hours): Currently in HD, states he is actually feeling better, told me he had a recent urological procedure so I reviewed old records    Medications:  Reviewed    Infusion Medications    sodium chloride 20 mL/hr at 03/08/23 0812    dextrose       Scheduled Medications    sodium chloride flush  10 mL IntraVENous 2 times per day    heparin (porcine)  5,000 Units SubCUTAneous 3 times per day    aspirin EC  81 mg Oral Daily    carvedilol  25 mg Oral BID with meals    doxepin  25 mg Oral Nightly    gabapentin  300 mg Oral Nightly    mometasone-formoterol  2 puff Inhalation BID    montelukast  10 mg Oral Nightly    rOPINIRole  0.5 mg Oral Nightly    sevelamer  800 mg Oral TID WC    tamsulosin  0.4 mg Oral Daily    levothyroxine  175 mcg Oral Daily    [START ON 3/9/2023] cefepime  1,000 mg IntraVENous Q24H    vancomycin (VANCOCIN) intermittent dosing (placeholder)   Other RX Placeholder    insulin lispro  0-4 Units SubCUTAneous TID WC    insulin lispro  0-4 Units SubCUTAneous Nightly     PRN Meds: sodium chloride flush, sodium chloride, ondansetron **OR** ondansetron, polyethylene glycol, acetaminophen **OR** acetaminophen, melatonin, nitroGLYCERIN, oxyCODONE-acetaminophen, HYDROmorphone **OR** HYDROmorphone, albuterol sulfate HFA, glucose, dextrose bolus **OR** dextrose bolus, glucagon (rDNA), dextrose    No intake or output data in the 24 hours ending 03/08/23 1350    Diet:  ADULT DIET; Regular;  Low Sodium (2 gm); Low Potassium (Less than 3000 mg/day); Low Phosphorus (Less than 1000 mg); Less than 60 gm; 1800 ml    Exam:  /78   Pulse 72   Temp 97.5 °F (36.4 °C)   Resp 19   Ht 6' 1\" (1.854 m)   Wt 295 lb 10.2 oz (134.1 kg)   SpO2 97%   BMI 39.00 kg/m²     General appearance: No apparent distress, appears stated age and cooperative. HEENT: Pupils equal, round, and reactive to light. Conjunctivae/corneas clear. Neck: Supple, with full range of motion. No jugular venous distention. Trachea midline. Respiratory:  Normal respiratory effort. Clear to auscultation, bilaterally without Rales/Wheezes/Rhonchi. Cardiovascular: Regular rate and rhythm with normal S1/S2 without murmurs, rubs or gallops. Abdomen: Soft, non-tender, non-distended with normal bowel sounds. Musculoskeletal: passive and active ROM x 4 extremities. Skin: Skin color, texture, turgor normal.    Neurologic:  Neurovascularly intact without any focal sensory/motor deficits.  Cranial nerves: II-XII intact, grossly non-focal.  Psychiatric: Alert and oriented, thought content appropriate  Capillary Refill: Brisk,< 3 seconds   Peripheral Pulses: +2 palpable, equal bilaterally       Labs:   Recent Labs     03/07/23 2131   WBC 5.3   HGB 10.6*   HCT 31.0*        Recent Labs     03/07/23 2131   *   K 4.1   CL 92*   CO2 29   BUN 39*   CREATININE 5.8*   CALCIUM 11.2*     Recent Labs     03/07/23  2131   AST 18   ALT <5*   BILITOT 0.4   ALKPHOS 69     Recent Labs     03/07/23  2218   INR 1.30*     Microbiology:    MRSA by PCR negative  Urine cultures pending  COVID-19 not detected  Influenza not detected  Blood cultures are pending    Urinalysis:      Lab Results   Component Value Date/Time    NITRU NEGATIVE 03/08/2023 05:00 AM    WBCUA 15-25 03/08/2023 05:00 AM    BACTERIA NONE SEEN 03/08/2023 05:00 AM    RBCUA  03/08/2023 05:00 AM    BLOODU LARGE 03/08/2023 05:00 AM    SPECGRAV 1.015 03/23/2022 04:45 AM    GLUCOSEU NEGATIVE 03/08/2023 05:00 AM       Radiology:  CT Head W/O Contrast    Result Date: 3/8/2023  CT head without contrast Comparison: CT/SR - CT HEAD WO CONTRAST - 04/08/2019 06:28 PM EDT Findings: No intra-axial mass, midline shift, hydrocephalus, or acute hemorrhage. Small area of hypodensity in the right inferior basal ganglia on image 16 is more evident compared to the prior exam. This may be an old lacunar infarct. Small bilateral maxillary sinus mucous retention cysts. No sinus or mastoid fluid. The orbits are within normal limits. There is no acute fracture. Suspected small old lacunar infarct of the right inferior basal ganglia. Otherwise no acute findings. This document has been electronically signed by: Leigh Velázquez MD on 03/08/2023 01:21 AM All CTs at this facility use dose modulation techniques and iterative reconstructions, and/or weight-based dosing when appropriate to reduce radiation to a low as reasonably achievable. XR CHEST PORTABLE    Result Date: 3/8/2023  1 view chest x-ray Comparison: CR/SR - XR CHEST PORTABLE - 07/19/2022 10:46 AM EDT Findings: Pulmonary vascular congestion again noted. Ill-defined lung opacities are minor and significantly less than seen on the prior exam. Cardiomegaly again noted. No acute fracture. Right internal jugular tunneled dialysis catheter is near the cavoatrial junction. Cardiomegaly with possible early volume overload. This document has been electronically signed by:  Leigh Velázquez MD on 03/08/2023 01:39 AM      DVT prophylaxis: [] Lovenox                                 [] SCDs                                 [x] SQ Heparin                                 [] Encourage ambulation           [] Already on Anticoagulation     Code Status: Full Code    PT/OT Eval Status: Monitor    Tele:   [x] Yes sinus rhythm heart rate 72             [] no    Active Hospital Problems    Diagnosis Date Noted    Weakness generalized [R53.1] 03/08/2023 Priority: Medium    Elevated troponin [R77.8] 03/08/2023     Priority: Medium       Electronically signed by SAHIL St CNP on 3/8/2023 at 1:50 PM

## 2023-03-08 NOTE — ED NOTES
Report given to [facility] and Ember Albert.      Sreekanth Amaro RN  03/08/23 8675

## 2023-03-09 LAB
25(OH)D3 SERPL-MCNC: 27 NG/ML (ref 30–100)
ALBUMIN SERPL BCG-MCNC: 3.4 G/DL (ref 3.5–5.1)
ALP SERPL-CCNC: 60 U/L (ref 38–126)
ALT SERPL W/O P-5'-P-CCNC: < 5 U/L (ref 11–66)
ANION GAP SERPL CALC-SCNC: 13 MEQ/L (ref 8–16)
AST SERPL-CCNC: 15 U/L (ref 5–40)
BACTERIA BLD AEROBE CULT: NORMAL
BACTERIA BLD AEROBE CULT: NORMAL
BILIRUB SERPL-MCNC: 0.4 MG/DL (ref 0.3–1.2)
BUN SERPL-MCNC: 36 MG/DL (ref 7–22)
CALCIUM SERPL-MCNC: 9.2 MG/DL (ref 8.5–10.5)
CHLORIDE SERPL-SCNC: 99 MEQ/L (ref 98–111)
CO2 SERPL-SCNC: 26 MEQ/L (ref 23–33)
CREAT SERPL-MCNC: 6.4 MG/DL (ref 0.4–1.2)
DEPRECATED RDW RBC AUTO: 57.4 FL (ref 35–45)
ERYTHROCYTE [DISTWIDTH] IN BLOOD BY AUTOMATED COUNT: 15.9 % (ref 11.5–14.5)
GFR SERPL CREATININE-BSD FRML MDRD: 9 ML/MIN/1.73M2
GLUCOSE BLD STRIP.AUTO-MCNC: 100 MG/DL (ref 70–108)
GLUCOSE BLD STRIP.AUTO-MCNC: 109 MG/DL (ref 70–108)
GLUCOSE SERPL-MCNC: 90 MG/DL (ref 70–108)
HCT VFR BLD AUTO: 26.1 % (ref 42–52)
HGB BLD-MCNC: 8.4 GM/DL (ref 14–18)
MCH RBC QN AUTO: 31.6 PG (ref 26–33)
MCHC RBC AUTO-ENTMCNC: 32.2 GM/DL (ref 32.2–35.5)
MCV RBC AUTO: 98.1 FL (ref 80–94)
PHOSPHATE SERPL-MCNC: 4.7 MG/DL (ref 2.4–4.7)
PLATELET # BLD AUTO: 158 THOU/MM3 (ref 130–400)
PMV BLD AUTO: 9.4 FL (ref 9.4–12.4)
POTASSIUM SERPL-SCNC: 4.1 MEQ/L (ref 3.5–5.2)
PROT SERPL-MCNC: 6.5 G/DL (ref 6.1–8)
PTH-INTACT SERPL-MCNC: 31 PG/ML (ref 15–65)
RBC # BLD AUTO: 2.66 MILL/MM3 (ref 4.7–6.1)
SODIUM SERPL-SCNC: 138 MEQ/L (ref 135–145)
VANCOMYCIN SERPL-MCNC: 13.9 UG/ML (ref 0.1–39.9)
WBC # BLD AUTO: 4.4 THOU/MM3 (ref 4.8–10.8)

## 2023-03-09 PROCEDURE — 85027 COMPLETE CBC AUTOMATED: CPT

## 2023-03-09 PROCEDURE — 6370000000 HC RX 637 (ALT 250 FOR IP): Performed by: NURSE PRACTITIONER

## 2023-03-09 PROCEDURE — 99232 SBSQ HOSP IP/OBS MODERATE 35: CPT | Performed by: NURSE PRACTITIONER

## 2023-03-09 PROCEDURE — 82306 VITAMIN D 25 HYDROXY: CPT

## 2023-03-09 PROCEDURE — 83970 ASSAY OF PARATHORMONE: CPT

## 2023-03-09 PROCEDURE — 2580000003 HC RX 258: Performed by: PHYSICIAN ASSISTANT

## 2023-03-09 PROCEDURE — 94640 AIRWAY INHALATION TREATMENT: CPT

## 2023-03-09 PROCEDURE — 97166 OT EVAL MOD COMPLEX 45 MIN: CPT

## 2023-03-09 PROCEDURE — 99232 SBSQ HOSP IP/OBS MODERATE 35: CPT | Performed by: INTERNAL MEDICINE

## 2023-03-09 PROCEDURE — 82948 REAGENT STRIP/BLOOD GLUCOSE: CPT

## 2023-03-09 PROCEDURE — 1200000000 HC SEMI PRIVATE

## 2023-03-09 PROCEDURE — 6370000000 HC RX 637 (ALT 250 FOR IP): Performed by: PHYSICIAN ASSISTANT

## 2023-03-09 PROCEDURE — 97535 SELF CARE MNGMENT TRAINING: CPT

## 2023-03-09 PROCEDURE — 6360000002 HC RX W HCPCS: Performed by: PHYSICIAN ASSISTANT

## 2023-03-09 PROCEDURE — 80202 ASSAY OF VANCOMYCIN: CPT

## 2023-03-09 PROCEDURE — 36415 COLL VENOUS BLD VENIPUNCTURE: CPT

## 2023-03-09 PROCEDURE — 80053 COMPREHEN METABOLIC PANEL: CPT

## 2023-03-09 PROCEDURE — 84100 ASSAY OF PHOSPHORUS: CPT

## 2023-03-09 RX ORDER — CARVEDILOL 25 MG/1
25 TABLET ORAL 2 TIMES DAILY WITH MEALS
Status: DISCONTINUED | OUTPATIENT
Start: 2023-03-09 | End: 2023-03-10 | Stop reason: HOSPADM

## 2023-03-09 RX ORDER — OXYCODONE AND ACETAMINOPHEN 10; 325 MG/1; MG/1
1 TABLET ORAL EVERY 6 HOURS PRN
Status: DISCONTINUED | OUTPATIENT
Start: 2023-03-09 | End: 2023-03-10 | Stop reason: HOSPADM

## 2023-03-09 RX ORDER — CARVEDILOL 3.12 MG/1
3.12 TABLET ORAL 2 TIMES DAILY WITH MEALS
Status: DISCONTINUED | OUTPATIENT
Start: 2023-03-09 | End: 2023-03-09 | Stop reason: SDUPTHER

## 2023-03-09 RX ORDER — DICYCLOMINE HYDROCHLORIDE 10 MG/1
20 CAPSULE ORAL ONCE
Status: COMPLETED | OUTPATIENT
Start: 2023-03-09 | End: 2023-03-09

## 2023-03-09 RX ORDER — CARVEDILOL 3.12 MG/1
3.12 TABLET ORAL 2 TIMES DAILY WITH MEALS
Status: DISCONTINUED | OUTPATIENT
Start: 2023-03-09 | End: 2023-03-10 | Stop reason: HOSPADM

## 2023-03-09 RX ADMIN — DOXEPIN HYDROCHLORIDE 25 MG: 25 CAPSULE ORAL at 19:56

## 2023-03-09 RX ADMIN — OXYCODONE AND ACETAMINOPHEN 1 TABLET: 10; 325 TABLET ORAL at 05:11

## 2023-03-09 RX ADMIN — SEVELAMER CARBONATE 800 MG: 800 TABLET, FILM COATED ORAL at 08:11

## 2023-03-09 RX ADMIN — ROPINIROLE HYDROCHLORIDE 0.5 MG: 0.5 TABLET, FILM COATED ORAL at 19:56

## 2023-03-09 RX ADMIN — DICYCLOMINE HYDROCHLORIDE 20 MG: 10 CAPSULE ORAL at 20:20

## 2023-03-09 RX ADMIN — ASPIRIN 81 MG: 81 TABLET, COATED ORAL at 08:11

## 2023-03-09 RX ADMIN — Medication 4.5 MG: at 19:56

## 2023-03-09 RX ADMIN — HEPARIN SODIUM 5000 UNITS: 5000 INJECTION INTRAVENOUS; SUBCUTANEOUS at 05:13

## 2023-03-09 RX ADMIN — CARVEDILOL 3.12 MG: 3.12 TABLET, FILM COATED ORAL at 17:10

## 2023-03-09 RX ADMIN — SEVELAMER CARBONATE 800 MG: 800 TABLET, FILM COATED ORAL at 17:10

## 2023-03-09 RX ADMIN — MOMETASONE FUROATE AND FORMOTEROL FUMARATE DIHYDRATE 2 PUFF: 100; 5 AEROSOL RESPIRATORY (INHALATION) at 07:51

## 2023-03-09 RX ADMIN — GABAPENTIN 300 MG: 300 CAPSULE ORAL at 19:56

## 2023-03-09 RX ADMIN — OXYCODONE AND ACETAMINOPHEN 1 TABLET: 10; 325 TABLET ORAL at 17:10

## 2023-03-09 RX ADMIN — TAMSULOSIN HYDROCHLORIDE 0.4 MG: 0.4 CAPSULE ORAL at 08:12

## 2023-03-09 RX ADMIN — POLYETHYLENE GLYCOL 3350 17 G: 17 POWDER, FOR SOLUTION ORAL at 15:19

## 2023-03-09 RX ADMIN — OXYCODONE AND ACETAMINOPHEN 1 TABLET: 10; 325 TABLET ORAL at 11:31

## 2023-03-09 RX ADMIN — CEFEPIME 1000 MG: 1 INJECTION, POWDER, FOR SOLUTION INTRAMUSCULAR; INTRAVENOUS at 05:16

## 2023-03-09 RX ADMIN — LEVOTHYROXINE SODIUM 175 MCG: 0.03 TABLET ORAL at 05:13

## 2023-03-09 RX ADMIN — SODIUM CHLORIDE: 9 INJECTION, SOLUTION INTRAVENOUS at 05:14

## 2023-03-09 RX ADMIN — MOMETASONE FUROATE AND FORMOTEROL FUMARATE DIHYDRATE 2 PUFF: 100; 5 AEROSOL RESPIRATORY (INHALATION) at 16:42

## 2023-03-09 RX ADMIN — MONTELUKAST SODIUM 10 MG: 10 TABLET ORAL at 19:56

## 2023-03-09 RX ADMIN — HEPARIN SODIUM 5000 UNITS: 5000 INJECTION INTRAVENOUS; SUBCUTANEOUS at 14:00

## 2023-03-09 RX ADMIN — SEVELAMER CARBONATE 800 MG: 800 TABLET, FILM COATED ORAL at 12:56

## 2023-03-09 ASSESSMENT — PAIN DESCRIPTION - DESCRIPTORS
DESCRIPTORS: BURNING;CRAMPING
DESCRIPTORS: ACHING
DESCRIPTORS: ACHING;THROBBING
DESCRIPTORS: ACHING

## 2023-03-09 ASSESSMENT — PAIN DESCRIPTION - ONSET
ONSET: ON-GOING
ONSET: ON-GOING

## 2023-03-09 ASSESSMENT — PAIN SCALES - GENERAL
PAINLEVEL_OUTOF10: 7
PAINLEVEL_OUTOF10: 6
PAINLEVEL_OUTOF10: 8
PAINLEVEL_OUTOF10: 7
PAINLEVEL_OUTOF10: 8
PAINLEVEL_OUTOF10: 4
PAINLEVEL_OUTOF10: 7

## 2023-03-09 ASSESSMENT — PAIN DESCRIPTION - FREQUENCY
FREQUENCY: CONTINUOUS
FREQUENCY: CONTINUOUS

## 2023-03-09 ASSESSMENT — PAIN DESCRIPTION - PAIN TYPE
TYPE: CHRONIC PAIN
TYPE: CHRONIC PAIN

## 2023-03-09 ASSESSMENT — PAIN DESCRIPTION - LOCATION
LOCATION: BACK
LOCATION: ABDOMEN

## 2023-03-09 ASSESSMENT — PAIN DESCRIPTION - ORIENTATION
ORIENTATION: LOWER
ORIENTATION: LOWER
ORIENTATION: RIGHT;LEFT;UPPER
ORIENTATION: LOWER

## 2023-03-09 NOTE — PROGRESS NOTES
Progress note: Infectious diseases    Patient - Alfonso Abraham,  Age - 63 y.o.    - 1960      Room Number - 8B-29/029-A   MRN -  [id number]   Acct # - [de-identified]  Date of Admission -  3/7/2023  9:10 PM    SUBJECTIVE:   He has no fever ,has dysuria  Has chronic back pain  OBJECTIVE   VITALS    height is 6' 1\" (1.854 m) and weight is 297 lb 9.9 oz (135 kg). His oral temperature is 97.8 °F (36.6 °C). His blood pressure is 88/52 (abnormal) and his pulse is 63. His respiration is 18 and oxygen saturation is 93%. Wt Readings from Last 3 Encounters:   23 297 lb 9.9 oz (135 kg)   23 300 lb (136.1 kg)   02/10/23 (!) 305 lb 9.6 oz (138.6 kg)       I/O (24 Hours)    Intake/Output Summary (Last 24 hours) at 3/9/2023 0819  Last data filed at 3/8/2023 1416  Gross per 24 hour   Intake 400 ml   Output 1400 ml   Net -1000 ml       General Appearance  Awake, alert, oriented,  not  In acute distress  HEENT - normocephalic, atraumatic, pink conjunctiva,  anicteric sclera  Neck - Supple, no mass  Lungs -  Bilateral   air entry, no rhonchi, no wheeze  Cardiovascular - Heart sounds are normal.  Regular rate and rhythm without murmur, gallop or rub.   Abdomen - soft, not distended, nontender,   Neurologic -oriented  Skin - No bruising or bleeding  Extremities - No edema, no cyanosis, clubbing     MEDICATIONS:      sodium chloride flush  10 mL IntraVENous 2 times per day    heparin (porcine)  5,000 Units SubCUTAneous 3 times per day    aspirin EC  81 mg Oral Daily    carvedilol  25 mg Oral BID with meals    doxepin  25 mg Oral Nightly    gabapentin  300 mg Oral Nightly    mometasone-formoterol  2 puff Inhalation BID    montelukast  10 mg Oral Nightly    rOPINIRole  0.5 mg Oral Nightly    sevelamer  800 mg Oral TID WC    tamsulosin  0.4 mg Oral Daily    levothyroxine  175 mcg Oral Daily    cefepime  1,000 mg IntraVENous Q24H insulin lispro  0-4 Units SubCUTAneous TID     insulin lispro  0-4 Units SubCUTAneous Nightly      sodium chloride 5 mL/hr at 03/09/23 0514    dextrose       sodium chloride flush, sodium chloride, ondansetron **OR** ondansetron, polyethylene glycol, acetaminophen **OR** acetaminophen, melatonin, nitroGLYCERIN, oxyCODONE-acetaminophen, HYDROmorphone **OR** HYDROmorphone, albuterol sulfate HFA, glucose, dextrose bolus **OR** dextrose bolus, glucagon (rDNA), dextrose      LABS:     CBC:   Recent Labs     03/07/23 2131 03/09/23  0553   WBC 5.3 4.4*   HGB 10.6* 8.4*    158     BMP:    Recent Labs     03/07/23 2131 03/09/23  0553   * 138   K 4.1 4.1   CL 92* 99   CO2 29 26   BUN 39* 36*   CREATININE 5.8* 6.4*   GLUCOSE 96 90     Calcium:  Recent Labs     03/09/23  0553   CALCIUM 9.2     Ionized Calcium:No results for input(s): IONCA in the last 72 hours. Magnesium:No results for input(s): MG in the last 72 hours. Phosphorus:  Recent Labs     03/09/23  0553   PHOS 4.7     BNP:No results for input(s): BNP in the last 72 hours. Glucose:  Recent Labs     03/08/23  1719 03/08/23 2028   POCGLU 122* 107     HgbA1C: No results for input(s): LABA1C in the last 72 hours. INR:   Recent Labs     03/07/23  2218   INR 1.30*     Hepatic:   Recent Labs     03/07/23 2131 03/09/23  0553   ALKPHOS 69 60   ALT <5* <5*   AST 18 15   PROT 8.4* 6.5   BILITOT 0.4 0.4   LABALBU 4.1 3.4*     Amylase and Lipase:  Recent Labs     03/08/23  0929   LACTA 0.7     Lactic Acid:   Recent Labs     03/08/23  0929   LACTA 0.7     Troponin: No results for input(s): CKTOTAL, CKMB, TROPONINI in the last 72 hours. BNP: No results for input(s): BNP in the last 72 hours.     CULTURES:   UA:   Recent Labs     03/08/23  0500   PHUR 6.5   COLORU ORANGE*   PROTEINU >= 300*   BLOODU LARGE*   RBCUA    WBCUA 15-25   BACTERIA NONE SEEN   NITRU NEGATIVE   GLUCOSEU NEGATIVE   BILIRUBINUR NEGATIVE   UROBILINOGEN 0.2   KETUA NEGATIVE Micro:   Lab Results   Component Value Date/Time    BC No growth 24 hours. 03/07/2023 10:18 PM    BC No growth 24 hours.  03/07/2023 10:18 PM          Problem list of patient:     Patient Active Problem List   Diagnosis Code    Allergy to bee sting Z91.030    Obesity, Class III, BMI 40-49.9 (morbid obesity) ([facility]) E66.01    Weight gain R63.5    Primary hypertension I10    Rheumatoid arteritis ([facility]) M05.20    Hypothyroidism E03.9    Gastroenteritis K52.9    Obstructive sleep apnea G47.33    Small intestinal bacterial overgrowth K63.89    Acute diarrhea R19.7    Generalized abdominal pain R10.84    Nausea and vomiting R11.2    Hepatomegaly R16.0    Ileus ([facility]) K56.7    Hypokalemia E87.6    S/P partial resection of colon Z90.49    S/P laparotomy Z98.890    Acute renal failure superimposed on stage 5 chronic kidney disease, not on chronic dialysis ([facility]) N17.9, N18.5    Hypernatremia E87.0    Serum potassium decreased E87.6    Other headache syndrome G44.89    RLS (restless legs syndrome) G25.81    Psychophysiological insomnia F51.04    Angina, class III ([facility]) I20.9    Acute respiratory failure with hypoxia ([facility]) J96.01    Dyspnea R06.00    CHF (NYHA class III, ACC/AHA stage C) (HCC) I50.9    Nonhealing surgical wound T81.89XA    Diabetes mellitus (HCC) E11.9    CAROLE (acute kidney injury) ([facility] [place] [address] N17.9    Acute hypoxemic respiratory failure (HCC) J96.01    Acute on chronic congestive heart failure (HCC) I50.9    Stage 3a chronic kidney disease (HCC) N18.31    Anemia of chronic renal failure N18.9, D63.1    CHF (congestive heart failure), NYHA class III, acute on chronic, combined (HCC) I50.43    CHF (congestive heart failure), NYHA class II, chronic, diastolic (HCC) F83.24    Chronic kidney disease, stage IV (severe) (HCC) N18.4    Hyperkalemia E87.5    ATN (acute tubular necrosis) (HCC) N17.0    Stage 4 chronic kidney disease (HCC) N18.4    Generalized weakness R53.1    Elevated troponin R77.8    ESRD on dialysis ([facility] [place] [address] N18.6, Z99.2    Hypercalcemia E83.52         ASSESSMENT/PLAN   Generalized weakness with tremors: better  ESRD on Home HD  Recent cystoscopy: has lower UTI symptoms  Blood and urine cx is so far negative  Will stop vancomycin  Observe today and plan discharge tomorrow after HD if he remains stable.       Ruth Briscoe MD, MD, FACP 3/9/2023 8:19 AM

## 2023-03-09 NOTE — PLAN OF CARE
Problem: Discharge Planning  Goal: Discharge to home or other facility with appropriate resources  Outcome: Progressing     Problem: Safety - Adult  Goal: Free from fall injury  Outcome: Progressing     Problem: Pain  Goal: Verbalizes/displays adequate comfort level or baseline comfort level  Outcome: Progressing     Problem: Respiratory - Adult  Goal: Achieves optimal ventilation and oxygenation  3/8/2023 2142 by Palmira Gotti RN  Outcome: Progressing  3/8/2023 1022 by Yoshi Puckett RCP  Outcome: Progressing     Problem: Chronic Conditions and Co-morbidities  Goal: Patient's chronic conditions and co-morbidity symptoms are monitored and maintained or improved  Outcome: Progressing

## 2023-03-09 NOTE — PLAN OF CARE
Problem: Respiratory - Adult  Goal: Achieves optimal ventilation and oxygenation  3/9/2023 1645 by Zayra Telles RCP  Outcome: Progressing

## 2023-03-09 NOTE — PROGRESS NOTES
[facility]   Nephrology progress note  3/9/2023, 3:08 PM      Pt Name:    Asuncion Riedel  MRN:     [id number]     YOB: 1960  Admit Date:    3/7/2023  9:10 PM    Chief Complaint: Nephrology following for ESRD and hemodialysis    Subjective:  Patient was seen and examined this morning  No chest pain or shortness of breath  Feels okay    Objective:  24HR INTAKE/OUTPUT:    Intake/Output Summary (Last 24 hours) at 3/9/2023 1508  Last data filed at 3/9/2023 0942  Gross per 24 hour   Intake 50 ml   Output --   Net 50 ml         I/O last 3 completed shifts: In: 400   Out: 1400   I/O this shift:   In: 48 [IV Piggyback:50]  Out: -    Admission weight: 295 lb (133.8 kg)  Wt Readings from Last 3 Encounters:   03/09/23 297 lb 9.9 oz (135 kg)   03/02/23 300 lb (136.1 kg)   02/10/23 (!) 305 lb 9.6 oz (138.6 kg)        Vitals :   Vitals:    03/09/23 0541 03/09/23 0806 03/09/23 1124 03/09/23 1201   BP:  (!) 88/52 115/61    Pulse:  63 71    Resp: 18 18 18 18   Temp:  97.8 °F (36.6 °C) 97.6 °F (36.4 °C)    TempSrc:  Oral Oral    SpO2:  93% 95%    Weight:       Height:           Physical examination  General Appearance: alert and cooperative with exam, appears comfortable, no distress  Mouth/Throat: Oral mucosa moist  Neck: No JVD  Lungs:  no use of accessory muscles  Heart:  S1, S2 heard  GI: soft, non-tender, no guarding    Medications:  Infusion:    sodium chloride 5 mL/hr at 03/09/23 0514    dextrose       Meds:    carvedilol  25 mg Oral BID WC    Or    carvedilol  3.125 mg Oral BID WC    sodium chloride flush  10 mL IntraVENous 2 times per day    heparin (porcine)  5,000 Units SubCUTAneous 3 times per day    aspirin EC  81 mg Oral Daily    doxepin  25 mg Oral Nightly    gabapentin  300 mg Oral Nightly    mometasone-formoterol  2 puff Inhalation BID    montelukast  10 mg Oral Nightly    rOPINIRole  0.5 mg Oral Nightly    sevelamer  800 mg Oral TID WC    tamsulosin  0.4 mg Oral Daily levothyroxine  175 mcg Oral Daily    cefepime  1,000 mg IntraVENous Q24H     Meds prn: oxyCODONE-acetaminophen, sodium chloride flush, sodium chloride, ondansetron **OR** ondansetron, polyethylene glycol, acetaminophen **OR** acetaminophen, melatonin, nitroGLYCERIN, HYDROmorphone **OR** HYDROmorphone, albuterol sulfate HFA, glucose, dextrose bolus **OR** dextrose bolus, glucagon (rDNA), dextrose     Lab Data :  CBC:   Recent Labs     03/07/23 2131 03/09/23  0553   WBC 5.3 4.4*   HGB 10.6* 8.4*   HCT 31.0* 26.1*    158     CMP:  Recent Labs     03/07/23 2131 03/09/23  0553   * 138   K 4.1 4.1   CL 92* 99   CO2 29 26   BUN 39* 36*   CREATININE 5.8* 6.4*   GLUCOSE 96 90   CALCIUM 11.2* 9.2   PHOS  --  4.7     Hepatic:   Recent Labs     03/07/23 2131 03/09/23  0553   LABALBU 4.1 3.4*   AST 18 15   ALT <5* <5*   BILITOT 0.4 0.4   ALKPHOS 69 60         Assessment and Plan:  ESRD on hemodialysis  Tolerated hemodialysis treatment well yesterday  No acute need for dialysis today  Continue dialysis Mondays, Wednesdays and Fridays while inpatient  Anemia in ESRD  Hypercalcemia. Improved  Recent cystoscopy  Renal osteodystrophy. Continue with phosphate binders    D/W patient     Sonny Foster MD  [facility]    This report has been created using voice recognition software.  It may contain minor errors which are inherent in voice recognition technology

## 2023-03-09 NOTE — CARE COORDINATION
3/9/23, 11:32 AM EST    DISCHARGE ON GOING [facility]ath[facility] 84 day: 1  Location: [facility]29/029-A Reason for admit: Weakness generalized [R53.1]  Generalized weakness [R53.1]  Other fatigue [R53.83]   Procedure: 3-8-23 CT Head  Suspected small old lacunar infarct of the right inferior basal ganglia. Otherwise no acute findings     Barriers to Discharge: ID cont to follow. Blood and urine cx neg so far. Vanc discontinued. Per ID note weakness improved. Possible discharge tomorrow following HD. PCP: Pelon Alva DO  Readmission Risk Score: 25.7%  Patient Goals/Plan/Treatment Preferences: Plans return home with spouse. Current home HD.

## 2023-03-09 NOTE — PLAN OF CARE
Problem: Respiratory - Adult  Goal: Achieves optimal ventilation and oxygenation  3/9/2023 0754 by Libertad Larkin RCP  Outcome: Progressing   Patient agrees to goals

## 2023-03-09 NOTE — PLAN OF CARE
Problem: Discharge Planning  Goal: Discharge to home or other facility with appropriate resources  Outcome: Progressing     Problem: Safety - Adult  Goal: Free from fall injury  Outcome: Progressing     Problem: Pain  Goal: Verbalizes/displays adequate comfort level or baseline comfort level  Outcome: Progressing     Problem: Respiratory - Adult  Goal: Achieves optimal ventilation and oxygenation  3/9/2023 1217 by [facility]  Outcome: Progressing     Problem: Chronic Conditions and Co-morbidities  Goal: Patient's chronic conditions and co-morbidity symptoms are monitored and maintained or improved  Outcome: Progressing     Care plan reviewed with patient. Patient verbalizes understanding of the plan of care and contributes to goal setting.

## 2023-03-09 NOTE — PLAN OF CARE
Problem: Discharge Planning  Goal: Discharge to home or other facility with appropriate resources  3/9/2023 1218 by Gali Coleman  Outcome: Progressing     Problem: Safety - Adult  Goal: Free from fall injury  3/9/2023 1218 by Gali Coleman  Outcome: Progressing     Problem: Pain  Goal: Verbalizes/displays adequate comfort level or baseline comfort level  3/9/2023 1218 by Gali Coleman  Outcome: Progressing     Problem: Respiratory - Adult  Goal: Achieves optimal ventilation and oxygenation  3/9/2023 1218 by Gali Coleman  Outcome: Progressing   Care plan reviewed with patient. Patient verbalizes understanding of the plan of care and contributes to goal setting.

## 2023-03-09 NOTE — PROGRESS NOTES
Hospitalist Progress Note    Patient:  Lisa Marie      Unit/Bed:8B-29/029-A    YOB: 1960    MRN: [id number]       Acct: [de-identified]     PCP: Aristides Mayberry DO    Date of Admission: 3/7/2023    Assessment/Plan:    Generalized weakness--afebrile; normal white count and differential; fully alert and oriented; plan HD; did have a recent cystoscopy and circumcision on March 5, 2023; vancomycin on 3/8 x 1 dose; Maxipime 3/9; appreciate infectious disease input; cultures so far showing no growth to date; urinalysis obtained on admission shows trace amount of leukocytes, negative nitrites and no bacteria  Possible early volume overload noted on chest x-ray from 3/8/2023--had HD 3/8; appreciate nephrology input  Recent cystoscopy, circumcision, dorsal penile nerve block on March 5, 2023 with Dr. Jalen Tilley  End-stage renal disease on home HD--appreciate nephrology input,  HD 3/8; planning HD tomorrow  Hyponatremia--mild, resolved  Lactic acidosis--resolved  Diabetes mellitus type 2, controlled--on low-dose sliding scale and has not used any so we will stop; not on any home medications; hemoglobin A1c noted to be 4.8 on January 17, 2023  Primary hypertension, uncontrolled--Coreg; stable, monitor  Hypothyroidism--TSH at 3.970  LAURIE--CPAP  Chronic normocytic anemia--stable  Chronic low back pain--K-pad heating pad ordered  Chronically elevated troponin levels  Old lacunar infarct of the right inferior basal ganglia  Diabetic neuropathy--Neurontin  Obesity with BMI 39.00        Expected discharge date: Pending clinical course~possibly 3/10 after hemodialysis if stable    Disposition:    [x] Home       [] TCU       [] Rehab       [] Psych       [] SNF       [] [place]       [] Other-    Chief Complaint: malaise, weakness    Hospital Course: Per H&P from 3/8/2023: Jacky Skiff A Trivette is a 63 y.o. male with PMHx of see above who presents to [facility] with malaise and generalized weakness. Patient performs HD at home for ESRD. He completed a session in the morning with no issues or complications he could recall however as the day went on he felt progressively weaker and run down. He states he became so weak he could not get out if his chair and eventually decided to come to the ED. He states he has been having chills and subjective fevers as the day went on. He endorses a dry cough in addition to his other symptoms. He states he has acute back pain more severe and different than his chronic pain but denies any numbness and tingling. He states he was also shaking when he tried to hold an object or drink a cup of coffee. \"    3/8--> hemodynamically stable, did have an isolated O2 sat of 85% but nothing further and was actually 96% on room air when he came in; patient was seen in HD; old records reviewed    3/9--> hemodynamically stable, remains afebrile; infectious disease note reviewed and vancomycin stopped, planning HD tomorrow    Subjective (past 24 hours):  Says he feels much better, shakes have resolved for the most part, he denies any pain except his chronic low back pain; wife left questions on a piece of paper and those were addressed with the patient    Medications:  Reviewed    Infusion Medications    sodium chloride 5 mL/hr at 03/09/23 0514    dextrose       Scheduled Medications    sodium chloride flush  10 mL IntraVENous 2 times per day    heparin (porcine)  5,000 Units SubCUTAneous 3 times per day    aspirin EC  81 mg Oral Daily    carvedilol  25 mg Oral BID with meals    doxepin  25 mg Oral Nightly    gabapentin  300 mg Oral Nightly    mometasone-formoterol  2 puff Inhalation BID    montelukast  10 mg Oral Nightly    rOPINIRole  0.5 mg Oral Nightly    sevelamer  800 mg Oral TID WC    tamsulosin  0.4 mg Oral Daily    levothyroxine  175 mcg Oral Daily    cefepime  1,000 mg IntraVENous Q24H    vancomycin (VANCOCIN) intermittent dosing (placeholder)   Other RX Placeholder    insulin lispro  0-4 Units SubCUTAneous TID     insulin lispro  0-4 Units SubCUTAneous Nightly     PRN Meds: sodium chloride flush, sodium chloride, ondansetron **OR** ondansetron, polyethylene glycol, acetaminophen **OR** acetaminophen, melatonin, nitroGLYCERIN, oxyCODONE-acetaminophen, HYDROmorphone **OR** HYDROmorphone, albuterol sulfate HFA, glucose, dextrose bolus **OR** dextrose bolus, glucagon (rDNA), dextrose      Intake/Output Summary (Last 24 hours) at 3/9/2023 0713  Last data filed at 3/8/2023 1416  Gross per 24 hour   Intake 400 ml   Output 1400 ml   Net -1000 ml       Diet:  ADULT DIET; Regular; Low Sodium (2 gm); Low Potassium (Less than 3000 mg/day); Low Phosphorus (Less than 1000 mg); Less than 60 gm; 1800 ml    Exam:  /82   Pulse 84   Temp 98.4 °F (36.9 °C) (Oral)   Resp 18   Ht 6' 1\" (1.854 m)   Wt 297 lb 9.9 oz (135 kg)   SpO2 97%   BMI 39.27 kg/m²     General appearance: No apparent distress, appears stated age and cooperative. HEENT: Pupils equal, round, and reactive to light. Conjunctivae/corneas clear. Neck: Supple, with full range of motion. No jugular venous distention. Trachea midline. Respiratory:  Normal respiratory effort. Clear to auscultation, bilaterally without Rales/Wheezes/Rhonchi. Cardiovascular: Regular rate and rhythm with normal S1/S2 without murmurs, rubs or gallops. Abdomen: Soft, non-tender, non-distended with normal bowel sounds. Musculoskeletal: passive and active ROM x 4 extremities. Skin: Skin color, texture, turgor normal.    Neurologic:  Neurovascularly intact without any focal sensory/motor deficits.  Cranial nerves: II-XII intact, grossly non-focal.  Psychiatric: Alert and oriented, thought content appropriate  Capillary Refill: Brisk,< 3 seconds   Peripheral Pulses: +2 palpable, equal bilaterally       Labs:   Recent Labs     03/07/23  2131 03/09/23  0553   WBC 5.3 4.4*   HGB 10.6* 8.4*   HCT 31.0* 26.1*    158       Recent Labs     03/07/23 2131 03/09/23  0553   * 138   K 4.1 4.1   CL 92* 99   CO2 29 26   BUN 39* 36*   CREATININE 5.8* 6.4*   CALCIUM 11.2* 9.2   PHOS  --  4.7       Recent Labs     03/07/23 2131 03/09/23  0553   AST 18 15   ALT <5* <5*   BILITOT 0.4 0.4   ALKPHOS 69 60       Recent Labs     03/07/23  2218   INR 1.30*       Microbiology:    MRSA by PCR negative  Urine cultures showing no growth preliminary  COVID-19 not detected  Influenza not detected  Blood cultures showing no growth at 24 hours    Urinalysis:      Lab Results   Component Value Date/Time    NITRU NEGATIVE 03/08/2023 05:00 AM    WBCUA 15-25 03/08/2023 05:00 AM    BACTERIA NONE SEEN 03/08/2023 05:00 AM    RBCUA  03/08/2023 05:00 AM    BLOODU LARGE 03/08/2023 05:00 AM    SPECGRAV 1.015 03/23/2022 04:45 AM    GLUCOSEU NEGATIVE 03/08/2023 05:00 AM       Radiology:  CT Head W/O Contrast    Result Date: 3/8/2023  CT head without contrast Comparison: CT/SR - CT HEAD WO CONTRAST - 04/08/2019 06:28 PM EDT Findings: No intra-axial mass, midline shift, hydrocephalus, or acute hemorrhage. Small area of hypodensity in the right inferior basal ganglia on image 16 is more evident compared to the prior exam. This may be an old lacunar infarct. Small bilateral maxillary sinus mucous retention cysts. No sinus or mastoid fluid. The orbits are within normal limits. There is no acute fracture. Suspected small old lacunar infarct of the right inferior basal ganglia. Otherwise no acute findings. This document has been electronically signed by: Iris Lopez MD on 03/08/2023 01:21 AM All CTs at this facility use dose modulation techniques and iterative reconstructions, and/or weight-based dosing when appropriate to reduce radiation to a low as reasonably achievable. XR CHEST PORTABLE    Result Date: 3/8/2023  1 view chest x-ray Comparison: CR/SR - XR CHEST PORTABLE - 07/19/2022 10:46 AM EDT Findings: Pulmonary vascular congestion again noted. Ill-defined lung opacities are minor and significantly less than seen on the prior exam. Cardiomegaly again noted. No acute fracture. Right internal jugular tunneled dialysis catheter is near the cavoatrial junction. Cardiomegaly with possible early volume overload. This document has been electronically signed by:  Essence Ely MD on 03/08/2023 01:39 AM      DVT prophylaxis: [] Lovenox                                 [] SCDs                                 [x] SQ Heparin                                 [] Encourage ambulation           [] Already on Anticoagulation     Code Status: Full Code    PT/OT Eval Status: Monitor    Tele:   [x] Yes sinus rhythm heart rate 70             [] no    Active Hospital Problems    Diagnosis Date Noted    Generalized weakness [R53.1] 03/08/2023     Priority: Medium    Elevated troponin [R77.8] 03/08/2023     Priority: Medium    ESRD on dialysis [facility]) [N18.6, Z99.2] 03/08/2023     Priority: Medium    Hypercalcemia [E83.52] 03/08/2023     Priority: Medium       Electronically signed by SAHIL Brannon CNP on 3/9/2023 at 7:13 AM

## 2023-03-09 NOTE — PROGRESS NOTES
[facility]  INPATIENT OCCUPATIONAL THERAPY  STRZ MED SURG 8B  EVALUATION    Time:   Time In: 7184  Time Out: 2784  Timed Code Treatment Minutes: 9 Minutes  Minutes: 18          Date: 3/9/2023  Patient Name: Rogers Silverman,   Gender: male      MRN: [id number]  : 1960  (63 y.o.)  Referring Practitioner: SAMANTHA Bell  Diagnosis: Weakness Generalized  Additional Pertinent Hx: per chart review; Rogers Silverman is a 63 y.o. male with PMHx of see above who presents to [address] [place] [address] with malaise and generalized weakness. Patient performs HD at home for ESRD. He completed a session in the morning with no issues or complications he could recall however as the day went on he felt progressively weaker and run down. He states he became so weak he could not get out if his chair and eventually decided to come to the ED. He states he has been having chills and subjective fevers as the day went on. He endorses a dry cough in addition to his other symptoms. He states he has acute back pain more severe and different than his chronic pain but denies any numbness and tingling. He states he was also shaking when he tried to hold an object or drink a cup of coffee. Restrictions/Precautions:  Restrictions/Precautions: General Precautions    Subjective  Chart Reviewed: Yes, Orders, Progress Notes, History and Physical  Patient assessed for rehabilitation services?: Yes  Family / Caregiver Present: No    Subjective: RN approved session, patient seated up in recliner upon OT arrival and agreeable to eval. patient A & O x 4. reports he has been permitted to be up in his room unassisted and has been taking himself to the BR. Pain: 8/10: chronic low back pain    Vitals: Vitals not assessed per clinical judgement, see nursing flowsheet    Social/Functional History:  Lives With: Spouse  Type of Home: House  Home Layout: Two level (goes upstairs 4 x / wk for his dialysis.  sleeps in recliner on first floor)  Home Access: Stairs to enter with rails  Entrance Stairs - Number of Steps: 4  Home Equipment: Les Mariano, rolling   Bathroom Shower/Tub: Tub/Shower unit  [employer]: Handicap height  Bathroom Equipment: Grab bars in shower, Shower chair (patient mainly takes sponge baths)  Bathroom Accessibility: Accessible    Receives Help From: Family  ADL Assistance: Independent  Homemaking Assistance: Independent  Ambulation Assistance: Independent  Transfer Assistance: Independent    Active : Yes  Occupation: On disability  Additional Comments: patient used cane to go up/down steps. patient's spouse able to assist as needed. VISION:Corrected    HEARING:  WFL    COGNITION: WFL    RANGE OF MOTION:  Bilateral Upper Extremity:  WFL  Reports he has a hx of R rotator cuff injury-did not demo any ROM deficits    STRENGTH:  Bilateral Upper Extremity:  WFL    SENSATION:   WFL    ADL:   Upper Extremity Dressing: Independent. To doff/don shirt   Footwear Management: Independent. To doff/don slipper sock by propping foot up on seat of recliner to reach foot to compensate for low back pain  Toilet Transfer: Independent. With no AD and no unsteadiness . BALANCE:  Sitting Balance:  Independent. Standing Balance: Supervision. With no AD    BED MOBILITY:  Not Tested    TRANSFERS:  Sit to Stand:  Independent. No unsteadiness in transition from sit to stand    FUNCTIONAL MOBILITY:  Assistive Device: None  Assist Level:  Supervision. Distance: To and from bathroom  No LOB or unsteadiness         Activity Tolerance:  Patient tolerance of  treatment: Good treatment tolerance      Assessment:  Assessment: patient reports his weakness has improved and has no explanation why it occurs. patient is mostly concerned of burden on spouse for managing his at-home dialysis, however states she would never mention that it was a burden. patient reports no concerns with returning home and returning to ADLS/IADLs.  patient is at/close to baseline. Performance deficits / Impairments: Decreased balance  Prognosis: Good  REQUIRES OT FOLLOW-UP: No  No Skilled OT: Independent with ADL's, No OT goals identified  Decision Making: Medium Complexity    Treatment Initiated: Treatment and education initiated within context of evaluation. Evaluation time included review of current medical information, gathering information related to past medical, social and functional history, completion of standardized testing, formal and informal observation of tasks, assessment of data and development of plan of care and goals. Treatment time included skilled education and facilitation of tasks to increase safety and independence with ADL's for improved functional independence and quality of life. Discharge Recommendations:  Defer OT at this time, Home with assist PRN    Patient Education:     Patient Education  Education Given To: Patient  Education Provided: Role of Therapy, Precautions, Fall Prevention Strategies, Plan of Care, ADL Adaptive Strategies, Transfer Training  Barriers to Learning: None    Equipment Recommendations:  Equipment Needed: No    Goals:  Patient goals : return home at [address]  Time Frame for Short Term Goals: no OT goals due to no OT needs identified. patient at/close to baseline. Following session, patient left in safe position with all fall risk precautions in place.

## 2023-03-10 VITALS
HEART RATE: 65 BPM | DIASTOLIC BLOOD PRESSURE: 74 MMHG | SYSTOLIC BLOOD PRESSURE: 130 MMHG | HEIGHT: 73 IN | WEIGHT: 295.86 LBS | OXYGEN SATURATION: 97 % | RESPIRATION RATE: 18 BRPM | BODY MASS INDEX: 39.21 KG/M2 | TEMPERATURE: 98.2 F

## 2023-03-10 LAB
ANION GAP SERPL CALC-SCNC: 18 MEQ/L (ref 8–16)
BACTERIA UR CULT: NORMAL
BUN SERPL-MCNC: 51 MG/DL (ref 7–22)
CALCIUM SERPL-MCNC: 9.7 MG/DL (ref 8.5–10.5)
CHLORIDE SERPL-SCNC: 98 MEQ/L (ref 98–111)
CO2 SERPL-SCNC: 22 MEQ/L (ref 23–33)
CREAT SERPL-MCNC: 8.7 MG/DL (ref 0.4–1.2)
DEPRECATED RDW RBC AUTO: 58.3 FL (ref 35–45)
ERYTHROCYTE [DISTWIDTH] IN BLOOD BY AUTOMATED COUNT: 15.6 % (ref 11.5–14.5)
GFR SERPL CREATININE-BSD FRML MDRD: 6 ML/MIN/1.73M2
GLUCOSE SERPL-MCNC: 79 MG/DL (ref 70–108)
HCT VFR BLD AUTO: 28.1 % (ref 42–52)
HGB BLD-MCNC: 8.6 GM/DL (ref 14–18)
MCH RBC QN AUTO: 31.2 PG (ref 26–33)
MCHC RBC AUTO-ENTMCNC: 30.6 GM/DL (ref 32.2–35.5)
MCV RBC AUTO: 101.8 FL (ref 80–94)
PLATELET # BLD AUTO: 193 THOU/MM3 (ref 130–400)
PMV BLD AUTO: 9.2 FL (ref 9.4–12.4)
POTASSIUM SERPL-SCNC: 4.2 MEQ/L (ref 3.5–5.2)
RBC # BLD AUTO: 2.76 MILL/MM3 (ref 4.7–6.1)
SODIUM SERPL-SCNC: 138 MEQ/L (ref 135–145)
WBC # BLD AUTO: 4.6 THOU/MM3 (ref 4.8–10.8)

## 2023-03-10 PROCEDURE — 6370000000 HC RX 637 (ALT 250 FOR IP): Performed by: NURSE PRACTITIONER

## 2023-03-10 PROCEDURE — 99232 SBSQ HOSP IP/OBS MODERATE 35: CPT | Performed by: INTERNAL MEDICINE

## 2023-03-10 PROCEDURE — 97116 GAIT TRAINING THERAPY: CPT

## 2023-03-10 PROCEDURE — 80048 BASIC METABOLIC PNL TOTAL CA: CPT

## 2023-03-10 PROCEDURE — 90935 HEMODIALYSIS ONE EVALUATION: CPT

## 2023-03-10 PROCEDURE — 2580000003 HC RX 258: Performed by: PHYSICIAN ASSISTANT

## 2023-03-10 PROCEDURE — 94640 AIRWAY INHALATION TREATMENT: CPT

## 2023-03-10 PROCEDURE — 99239 HOSP IP/OBS DSCHRG MGMT >30: CPT | Performed by: NURSE PRACTITIONER

## 2023-03-10 PROCEDURE — 6370000000 HC RX 637 (ALT 250 FOR IP): Performed by: PHYSICIAN ASSISTANT

## 2023-03-10 PROCEDURE — 6360000002 HC RX W HCPCS: Performed by: INTERNAL MEDICINE

## 2023-03-10 PROCEDURE — 36415 COLL VENOUS BLD VENIPUNCTURE: CPT

## 2023-03-10 PROCEDURE — 85027 COMPLETE CBC AUTOMATED: CPT

## 2023-03-10 PROCEDURE — 6360000002 HC RX W HCPCS: Performed by: PHYSICIAN ASSISTANT

## 2023-03-10 PROCEDURE — 97162 PT EVAL MOD COMPLEX 30 MIN: CPT

## 2023-03-10 RX ADMIN — MOMETASONE FUROATE AND FORMOTEROL FUMARATE DIHYDRATE 2 PUFF: 100; 5 AEROSOL RESPIRATORY (INHALATION) at 09:10

## 2023-03-10 RX ADMIN — TAMSULOSIN HYDROCHLORIDE 0.4 MG: 0.4 CAPSULE ORAL at 08:44

## 2023-03-10 RX ADMIN — ASPIRIN 81 MG: 81 TABLET, COATED ORAL at 08:44

## 2023-03-10 RX ADMIN — HEPARIN SODIUM 5000 UNITS: 5000 INJECTION INTRAVENOUS; SUBCUTANEOUS at 15:20

## 2023-03-10 RX ADMIN — SEVELAMER CARBONATE 800 MG: 800 TABLET, FILM COATED ORAL at 15:07

## 2023-03-10 RX ADMIN — SEVELAMER CARBONATE 800 MG: 800 TABLET, FILM COATED ORAL at 08:44

## 2023-03-10 RX ADMIN — EPOETIN ALFA-EPBX 6000 UNITS: 3000 INJECTION, SOLUTION INTRAVENOUS; SUBCUTANEOUS at 10:56

## 2023-03-10 RX ADMIN — HEPARIN SODIUM 5000 UNITS: 5000 INJECTION INTRAVENOUS; SUBCUTANEOUS at 05:23

## 2023-03-10 RX ADMIN — OXYCODONE AND ACETAMINOPHEN 1 TABLET: 10; 325 TABLET ORAL at 05:23

## 2023-03-10 RX ADMIN — SODIUM CHLORIDE, PRESERVATIVE FREE 10 ML: 5 INJECTION INTRAVENOUS at 08:45

## 2023-03-10 RX ADMIN — LEVOTHYROXINE SODIUM 175 MCG: 0.03 TABLET ORAL at 05:23

## 2023-03-10 ASSESSMENT — PAIN DESCRIPTION - ORIENTATION: ORIENTATION: LOWER

## 2023-03-10 ASSESSMENT — PAIN DESCRIPTION - LOCATION
LOCATION: BACK
LOCATION: BACK

## 2023-03-10 ASSESSMENT — PAIN SCALES - GENERAL
PAINLEVEL_OUTOF10: 8
PAINLEVEL_OUTOF10: 7

## 2023-03-10 ASSESSMENT — PAIN DESCRIPTION - DESCRIPTORS: DESCRIPTORS: ACHING

## 2023-03-10 ASSESSMENT — PAIN DESCRIPTION - PAIN TYPE: TYPE: CHRONIC PAIN

## 2023-03-10 NOTE — PLAN OF CARE
Problem: Discharge Planning  Goal: Discharge to home or other facility with appropriate resources  3/9/2023 2036 by John Monique RN  Outcome: Progressing  Flowsheets (Taken 3/9/2023 1950)  Discharge to home or other facility with appropriate resources:   Identify barriers to discharge with patient and caregiver   Arrange for needed discharge resources and transportation as appropriate   Identify discharge learning needs (meds, wound care, etc)   Refer to discharge planning if patient needs post-hospital services based on physician order or complex needs related to functional status, cognitive ability or social support system  3/9/2023 1218 by Reji Nolasco  Outcome: Progressing  3/9/2023 1217 by Reji Nolasco  Outcome: Progressing     Problem: Safety - Adult  Goal: Free from fall injury  3/9/2023 2036 by John Monique RN  Outcome: Progressing  3/9/2023 1218 by Reji Nolasco  Outcome: Progressing  3/9/2023 1217 by Reji Nolasco  Outcome: Progressing     Problem: Pain  Goal: Verbalizes/displays adequate comfort level or baseline comfort level  3/9/2023 2036 by John Monique RN  Outcome: Progressing  3/9/2023 1218 by Reji Nolasco  Outcome: Progressing  3/9/2023 1217 by Reji Nolasco  Outcome: Progressing     Problem: Respiratory - Adult  Goal: Achieves optimal ventilation and oxygenation  3/9/2023 2036 by John Monique RN  Outcome: Progressing  Flowsheets (Taken 3/9/2023 1950)  Achieves optimal ventilation and oxygenation: Assess for changes in respiratory status  3/9/2023 1645 by Kan Louis RCP  Outcome: Progressing  3/9/2023 1218 by Reji Nolasco  Outcome: Progressing  3/9/2023 1217 by Reji Nolasco  Outcome: Progressing  3/9/2023 0754 by Kan Louis RCP  Outcome: Progressing     Problem: Chronic Conditions and Co-morbidities  Goal: Patient's chronic conditions and co-morbidity symptoms are monitored and maintained or improved  3/9/2023 2036 by John Monique RN  Outcome: Progressing  Flowsheets (Taken 3/9/2023 1950)  Care Plan - Patient's Chronic Conditions and Co-Morbidity Symptoms are Monitored and Maintained or Improved:   Monitor and assess patient's chronic conditions and comorbid symptoms for stability, deterioration, or improvement   Update acute care plan with appropriate goals if chronic or comorbid symptoms are exacerbated and prevent overall improvement and discharge   Collaborate with multidisciplinary team to address chronic and comorbid conditions and prevent exacerbation or deterioration  3/9/2023 1218 by Curt Hayes  Outcome: Progressing  3/9/2023 1217 by Curt Hayes  Outcome: Progressing

## 2023-03-10 NOTE — DISCHARGE INSTRUCTIONS
Continue your home dialysis as per your previous schedule    Monitor your blood pressure and record    Monitor your symptoms and record

## 2023-03-10 NOTE — PROGRESS NOTES
[address] [place] [address]  INPATIENT PHYSICAL THERAPY  EVALUATION  [facility] SURG 8B - 8B-29/029-A    Time In: 0932  Time Out: 0800  Timed Code Treatment Minutes: 11 Minutes  Minutes: 21          Date: 3/10/2023  Patient Name: Reyes Lilly,  Gender:  male        MRN: [id number]  : 1960  (63 y.o.)      Referring Practitioner: SAMANTHA De La Cruz  Diagnosis: Generalized weakness  Additional Pertinent Hx: Per H&P 3/8: Reyes Lilly is a 63 y.o. male who presents to [address] [place] [address] with malaise and generalized weakness. Patient performs HD at home for ESRD. He completed a session in the morning with no issues or complications he could recall however as the day went on he felt progressively weaker and run down. He states he became so weak he could not get out if his chair and eventually decided to come to the ED. He states he has been having chills and subjective fevers as the day went on. He endorses a dry cough in addition to his other symptoms. He states he has acute back pain more severe and different than his chronic pain but denies any numbness and tingling. He states he was also shaking when he tried to hold an object or drink a cup of coffee. Pt presents with weakness, ESRD, and Possible early volume overload and Recent cystoscopy, circumcision, dorsal penile nerve block on 2023 with Dr. Rod Sanz. Restrictions/Precautions:  Restrictions/Precautions: General Precautions, Fall Risk    Subjective:  Chart Reviewed: Yes  Patient assessed for rehabilitation services?: Yes  Family / Caregiver Present: No  Subjective: RN approved session. Pt pleasantly agrees for evaluation. He denies concern with return home with his wife and was receptive to PT recommendations for increased assist, continued PT, and use of cane.     General:  Overall Orientation Status: Within Functional Limits  Vision: Impaired  Vision Exceptions: Wears glasses at all times  Hearing: Within functional limits       Pain: yes, chronic back pain. unrated    Vitals: Vitals not assessed per clinical judgement, see nursing flowsheet    Social/Functional History:    Lives With: Spouse  Type of Home: House  Home Layout: Two level (goes upstairs 4 x / wk for his dialysis. sleeps in recliner on first floor)  Home Access: Stairs to enter with rails  Entrance Stairs - Number of Steps: 4  Home Equipment: Fabiene Shaffer, rolling     Bathroom Shower/Tub: Tub/Shower unit  [employer]: Handicap height  Bathroom Equipment: Grab bars in shower, Shower chair (patient mainly takes sponge baths)  Bathroom Accessibility: Accessible    Receives Help From: Family  ADL Assistance: Independent  Homemaking Assistance: Independent  Ambulation Assistance: Independent  Transfer Assistance: Independent    Active : Yes  Occupation: On disability  Additional Comments: patient used cane to go up/down steps 15 L rail. patient's spouse able to assist as needed. OBJECTIVE:  Range of Motion:  Bilateral Lower Extremity: WFL    Strength:  Bilateral Lower Extremity: WFL  4+/5 all all joints     Balance:  Static Sitting Balance:  Stand By Assistance  Dynamic Sitting Balance: Stand By Assistance  Static Standing Balance: Stand By Assistance, Contact Guard Assistance  Dynamic Standing Balance: Contact Guard Assistance, Minimal Assistance  Min assist provided with the Tinetti. Bed Mobility:  Not Tested    Transfers:  Sit to Stand: Stand By Assistance  Stand to Sit:Stand By Assistance    Ambulation:  Stand By Assistance, [employer], with verbal cues , with increased time for completion  Distance: 70'x2  Surface: Level Tile  Device:Cane  Gait Deviations:  Slow Diane, Wide Base of Support, Mild Path Deviations, and Decreased Terminal Knee Extension    Pt ambulated slowly with wide PAUL and lack of TKE. Grossly steady but minor path deviations. Ambulated for ~15' with no AD, increased deviations.   Encouraged pt to use to use the cane consistently of improved steadiness and fluidity of gait.     CGA provided intermittently with gait for improved stability.     Stairs:  Contact Guard Assistance, with cues for safety, with verbal cues , with increased time for completion  Number of Steps: 9  Height: 6\" step with uni HR and cane  Began to complete with reciprocal pattern, cues for nonreciprocal pattern and to place entire foot on the step provided.     Educated on stair safety and having assist     Exercise:none    Functional Outcome Measures: Completed  Balance Score: 11  Gait Score: 9  Tinetti Total Score: 20/28 with moderate fall risk   AM-PAC Inpatient Mobility Raw Score : 18  AM-PAC Inpatient T-Scale Score : 43.63  Risk Indicators:  Less than/equal to 18 = high risk  19-23 Moderate risk  Greater than/equal to 24 = low risk      ASSESSMENT:  Activity Tolerance:  Patient tolerance of  treatment: good. Pt tolerated mobility well. He is noted to have some deconditioning and a moderate fall risk but good participation and safety awareness. CGA provided intermittently for stability, majority of mobility completed with SBA.     Treatment Initiated: Treatment and education initiated within context of evaluation.  Evaluation time included review of current medical information, gathering information related to past medical, social and functional history, completion of standardized testing, formal and informal observation of tasks, assessment of data and development of plan of care and goals.  Treatment time included skilled education and facilitation of tasks to increase safety and independence with functional mobility for improved independence and quality of life.    Assessment:  Body Structures, Functions, Activity Limitations Requiring Skilled Therapeutic Intervention: Decreased functional mobility , Decreased balance, Decreased endurance  Assessment: This patient is a 62 y.o. who presents with weakness. This is a decline from the patient's baseline status of mod I  with cane and living with his wife. He has a moderate fall risk on the Tinetti,  scored a 20/28. The patient is observed to have deficits in strength, balance, activity tolerance, and safety awareness and would benefit from skilled PT services to progress functional mobility, safety awareness, and to decrease overall risk of falls. Pt would benefit from increased assist, continued PT and use of cane. Therapy Prognosis: Good    Requires PT Follow-Up: Yes    Discharge Recommendations:  Discharge Recommendations: Continue to assess pending progress, Home with Home health PT    Patient Education:      .     Patient Education  Education Given To: Patient  Education Provided: Role of Therapy, Plan of Care, Equipment, Transfer Training, Fall Prevention Strategies  Education Method: Demonstration, Verbal  Education Outcome: Continued education needed, Verbalized understanding, Demonstrated understanding       Equipment Recommendations:  Equipment Needed: No    Plan:  Current Treatment Recommendations: Strengthening, Balance training, Functional mobility training, Transfer training, Endurance training, Neuromuscular re-education, Stair training, Gait training, Home exercise program, Safety education & training, Patient/Caregiver education & training, Equipment evaluation, education, & procurement, Therapeutic activities  General Plan:  (5x GM)    Goals:  Patient Goals : none stated  Short Term Goals  Time Frame for Short Term Goals: by hospital d/c  Short Term Goal 1: Pt to complete supine <->sit indep for ease getting out of bed  Short Term Goal 2: Pt to compelte sit <->Stand mod I with cane for safe transfers  Short Term Goal 3: Pt to ambulate >=50' mod I with cane for safe mobility in his environment  Short Term Goal 4: Pt to complete >=9 steps with cane and uni rail for safe access to his bedroom  Short Term Goal 5: Pt to improve Tinetti score to >=24/28 to progress to the low fall risk category  Long Term Goals  Time Frame for Long Term Goals : NA due to short ELOS    Following session, patient left in safe position with all fall risk precautions in place.

## 2023-03-10 NOTE — PROGRESS NOTES
Hospitalist Progress Note    Patient:  Andrea Rivas      Unit/Bed:8B-29/029-A    YOB: 1960    MRN: [id number]       Acct: [de-identified]     PCP: Carolina Kaur DO    Date of Admission: 3/7/2023    Assessment/Plan:    Generalized weakness--afebrile; normal white count and differential; fully alert and oriented; did have a recent cystoscopy and circumcision on March 5, 2023; vancomycin on 3/8 x 1 dose; Maxipime 3/9; appreciate infectious disease input; cultures so far showing no growth to date; urinalysis obtained on admission shows trace amount of leukocytes, negative nitrites and no bacteria; participated with therapy well  Possible early volume overload noted on chest x-ray from 3/8/2023--had HD 3/8, 3/10; appreciate nephrology input  Recent cystoscopy, circumcision, dorsal penile nerve block on March 5, 2023 with Dr. Trish Lorenzo  End-stage renal disease on home HD--appreciate nephrology input,  HD 3/8; planning HD today; per nephrology note he is to continue dialysis Monday/Wednesday/Friday   Hyponatremia--mild, resolved  Lactic acidosis--resolved  Diabetes mellitus type 2, controlled--not on any home medications; hemoglobin A1c noted to be 4.8 on January 17, 2023  Primary hypertension, uncontrolled--Coreg; stable, monitor  Hypothyroidism--TSH at 3.970  LAURIE--CPAP  Chronic normocytic anemia--stable; Retacrit x1 dose per nephrology today  Chronic low back pain--K-pad heating pad ordered  Chronically elevated troponin levels  Old lacunar infarct of the right inferior basal ganglia--noted on CT head on admission; no deficits  Diabetic neuropathy--Neurontin  Obesity with BMI 39.00        Expected discharge date: Pending clinical course~possibly 3/10 after hemodialysis if stable    Disposition:    [x] Home       [] TCU       [] Rehab       [] Psych       [] SNF       [] [place]       [] Other-    Chief Complaint: malaise, weakness    Hospital Course: Per H&P from 3/8/2023: Randol Pretty Ivon Salomon is a 63 y.o. male with PMHx of see above who presents to [facility] with malaise and generalized weakness. Patient performs HD at home for ESRD. He completed a session in the morning with no issues or complications he could recall however as the day went on he felt progressively weaker and run down. He states he became so weak he could not get out if his chair and eventually decided to come to the ED. He states he has been having chills and subjective fevers as the day went on. He endorses a dry cough in addition to his other symptoms. He states he has acute back pain more severe and different than his chronic pain but denies any numbness and tingling. He states he was also shaking when he tried to hold an object or drink a cup of coffee. \"    3/8--> hemodynamically stable, did have an isolated O2 sat of 85% but nothing further and was actually 96% on room air when he came in; patient was seen in HD; old records reviewed    3/9--> hemodynamically stable, remains afebrile; infectious disease note reviewed and vancomycin stopped, planning HD tomorrow    3/10--> hemodynamically stable; nephrology note reviewed; planning HD today    Subjective (past 24 hours):  Says he feels much better, shakes have resolved for the most part, he denies any pain except his chronic low back pain; he states this feels much better and explained to him we still have an etiology for his issues possibly his recent urological instrumentation, he is eating well, he participated with therapy well, no family at bedside    Medications:  Reviewed    Infusion Medications    sodium chloride 5 mL/hr at 03/09/23 0514    dextrose       Scheduled Medications    carvedilol  25 mg Oral BID     Or    carvedilol  3.125 mg Oral BID     sodium chloride flush  10 mL IntraVENous 2 times per day    heparin (porcine)  5,000 Units SubCUTAneous 3 times per day    aspirin EC  81 mg Oral Daily    doxepin  25 mg Oral Nightly    gabapentin 300 mg Oral Nightly    mometasone-formoterol  2 puff Inhalation BID    montelukast  10 mg Oral Nightly    rOPINIRole  0.5 mg Oral Nightly    sevelamer  800 mg Oral TID WC    tamsulosin  0.4 mg Oral Daily    levothyroxine  175 mcg Oral Daily    cefepime  1,000 mg IntraVENous Q24H     PRN Meds: oxyCODONE-acetaminophen, sodium chloride flush, sodium chloride, ondansetron **OR** ondansetron, polyethylene glycol, acetaminophen **OR** acetaminophen, melatonin, nitroGLYCERIN, HYDROmorphone **OR** HYDROmorphone, albuterol sulfate HFA, glucose, dextrose bolus **OR** dextrose bolus, glucagon (rDNA), dextrose      Intake/Output Summary (Last 24 hours) at 3/10/2023 0733  Last data filed at 3/9/2023 1534  Gross per 24 hour   Intake 50 ml   Output 400 ml   Net -350 ml         Diet:  ADULT DIET; Regular; Low Sodium (2 gm); Low Potassium (Less than 3000 mg/day); Low Phosphorus (Less than 1000 mg); Less than 60 gm; 1800 ml    Exam:  /66   Pulse 62   Temp 97.4 °F (36.3 °C) (Oral)   Resp 18   Ht 6' 1\" (1.854 m)   Wt 297 lb 6.4 oz (134.9 kg)   SpO2 98%   BMI 39.24 kg/m²     General appearance: No apparent distress, appears stated age and cooperative. HEENT: Pupils equal, round, and reactive to light. Conjunctivae/corneas clear. Neck: Supple, with full range of motion. No jugular venous distention. Trachea midline. Respiratory:  Normal respiratory effort. Clear to auscultation, bilaterally without Rales/Wheezes/Rhonchi. Cardiovascular: Regular rate and rhythm with normal S1/S2 without murmurs, rubs or gallops. Abdomen: Soft, non-tender, non-distended with normal bowel sounds. Musculoskeletal: passive and active ROM x 4 extremities. Skin: Skin color, texture, turgor normal.    Neurologic:  Neurovascularly intact without any focal sensory/motor deficits.  Cranial nerves: II-XII intact, grossly non-focal.  Psychiatric: Alert and oriented, thought content appropriate  Capillary Refill: Brisk,< 3 seconds   Peripheral Pulses: +2 palpable, equal bilaterally       Labs:   Recent Labs     03/07/23 2131 03/09/23  0553 03/10/23  0619   WBC 5.3 4.4* 4.6*   HGB 10.6* 8.4* 8.6*   HCT 31.0* 26.1* 28.1*    158 193       Recent Labs     03/07/23 2131 03/09/23  0553 03/10/23  0619   * 138 138   K 4.1 4.1 4.2   CL 92* 99 98   CO2 29 26 22*   BUN 39* 36* 51*   CREATININE 5.8* 6.4* 8.7*   CALCIUM 11.2* 9.2 9.7   PHOS  --  4.7  --        Recent Labs     03/07/23 2131 03/09/23  0553   AST 18 15   ALT <5* <5*   BILITOT 0.4 0.4   ALKPHOS 69 60       Recent Labs     03/07/23 2218   INR 1.30*       Microbiology:    MRSA by PCR negative  Urine cultures showing no growth  COVID-19 not detected  Influenza not detected  Blood cultures showing no growth at 48 hours    Urinalysis:      Lab Results   Component Value Date/Time    NITRU NEGATIVE 03/08/2023 05:00 AM    WBCUA 15-25 03/08/2023 05:00 AM    BACTERIA NONE SEEN 03/08/2023 05:00 AM    RBCUA  03/08/2023 05:00 AM    BLOODU LARGE 03/08/2023 05:00 AM    SPECGRAV 1.015 03/23/2022 04:45 AM    GLUCOSEU NEGATIVE 03/08/2023 05:00 AM       Radiology:  CT Head W/O Contrast    Result Date: 3/8/2023  CT head without contrast Comparison: CT/SR - CT HEAD WO CONTRAST - 04/08/2019 06:28 PM EDT Findings: No intra-axial mass, midline shift, hydrocephalus, or acute hemorrhage. Small area of hypodensity in the right inferior basal ganglia on image 16 is more evident compared to the prior exam. This may be an old lacunar infarct. Small bilateral maxillary sinus mucous retention cysts. No sinus or mastoid fluid. The orbits are within normal limits. There is no acute fracture. Suspected small old lacunar infarct of the right inferior basal ganglia. Otherwise no acute findings. This document has been electronically signed by:  Mane Silva MD on 03/08/2023 01:21 AM All CTs at this facility use dose modulation techniques and iterative reconstructions, and/or weight-based dosing when appropriate to reduce radiation to a low as reasonably achievable. XR CHEST PORTABLE    Result Date: 3/8/2023  1 view chest x-ray Comparison: CR/SR - XR CHEST PORTABLE - 07/19/2022 10:46 AM EDT Findings: Pulmonary vascular congestion again noted. Ill-defined lung opacities are minor and significantly less than seen on the prior exam. Cardiomegaly again noted. No acute fracture. Right internal jugular tunneled dialysis catheter is near the cavoatrial junction. Cardiomegaly with possible early volume overload. This document has been electronically signed by:  Nelly Hernandez MD on 03/08/2023 01:39 AM      DVT prophylaxis: [] Lovenox                                 [] SCDs                                 [x] SQ Heparin                                 [] Encourage ambulation           [] Already on Anticoagulation     Code Status: Full Code    PT/OT Eval Status: Monitor    Tele:   [] Yes              [x] no    Active Hospital Problems    Diagnosis Date Noted    Generalized weakness [R53.1] 03/08/2023     Priority: Medium    Elevated troponin [R77.8] 03/08/2023     Priority: Medium    ESRD on dialysis [facility]) [N18.6, Z99.2] 03/08/2023     Priority: Medium    Hypercalcemia [E83.52] 03/08/2023     Priority: Medium       Electronically signed by SAHIL Garcia CNP on 3/10/2023 at 7:33 AM

## 2023-03-10 NOTE — PROGRESS NOTES
Progress note: Infectious diseases    Patient - Praveena Talavera,  Age - 63 y.o.    - 1960      Room Number - 8B-29/029-A   MRN -  [id number]   Acct # - [de-identified]  Date of Admission -  3/7/2023  9:10 PM    SUBJECTIVE:   He was seen at HD  No fever or chills, cultures remain negative  OBJECTIVE   VITALS    height is 6' 1\" (1.854 m) and weight is 298 lb 4.5 oz (135.3 kg). His temperature is 96.6 °F (35.9 °C) (abnormal). His blood pressure is 130/75 and his pulse is 65. His respiration is 18 and oxygen saturation is 93%.        Wt Readings from Last 3 Encounters:   03/10/23 298 lb 4.5 oz (135.3 kg)   23 300 lb (136.1 kg)   02/10/23 (!) 305 lb 9.6 oz (138.6 kg)       I/O (24 Hours)    Intake/Output Summary (Last 24 hours) at 3/10/2023 1228  Last data filed at 3/10/2023 0900  Gross per 24 hour   Intake 480 ml   Output 400 ml   Net 80 ml         General Appearance  Awake, alert, oriented,  not  In acute distress  HEENT - normocephalic, atraumatic, pink conjunctiva,  anicteric sclera  Neck - Supple, no mass  Lungs -  Bilateral   air entry, no rhonchi, no wheeze  Cardiovascular - Heart sounds are normal.    Abdomen - soft, not distended, nontender,   Neurologic -oriented  Skin - No bruising or bleeding  Extremities - No edema, no cyanosis, clubbing     MEDICATIONS:      epoetin justin-epbx  6,000 Units SubCUTAneous Once per day on     carvedilol  25 mg Oral BID WC    Or    carvedilol  3.125 mg Oral BID WC    sodium chloride flush  10 mL IntraVENous 2 times per day    heparin (porcine)  5,000 Units SubCUTAneous 3 times per day    aspirin EC  81 mg Oral Daily    doxepin  25 mg Oral Nightly    gabapentin  300 mg Oral Nightly    mometasone-formoterol  2 puff Inhalation BID    montelukast  10 mg Oral Nightly    rOPINIRole  0.5 mg Oral Nightly    sevelamer  800 mg Oral TID WC    tamsulosin  0.4 mg Oral Daily levothyroxine  175 mcg Oral Daily    cefepime  1,000 mg IntraVENous Q24H      sodium chloride 5 mL/hr at 03/09/23 0514    dextrose       oxyCODONE-acetaminophen, sodium chloride flush, sodium chloride, ondansetron **OR** ondansetron, polyethylene glycol, acetaminophen **OR** acetaminophen, melatonin, nitroGLYCERIN, HYDROmorphone **OR** HYDROmorphone, albuterol sulfate HFA, glucose, dextrose bolus **OR** dextrose bolus, glucagon (rDNA), dextrose      LABS:     CBC:   Recent Labs     03/07/23 2131 03/09/23  0553 03/10/23  0619   WBC 5.3 4.4* 4.6*   HGB 10.6* 8.4* 8.6*    158 193       BMP:    Recent Labs     03/07/23 2131 03/09/23  0553 03/10/23  0619   * 138 138   K 4.1 4.1 4.2   CL 92* 99 98   CO2 29 26 22*   BUN 39* 36* 51*   CREATININE 5.8* 6.4* 8.7*   GLUCOSE 96 90 79       Calcium:  Recent Labs     03/10/23  0619   CALCIUM 9.7       Ionized Calcium:No results for input(s): IONCA in the last 72 hours. Magnesium:No results for input(s): MG in the last 72 hours. Phosphorus:  Recent Labs     03/09/23  0553   PHOS 4.7       BNP:No results for input(s): BNP in the last 72 hours. Glucose:  Recent Labs     03/08/23 2028 03/09/23  0815 03/09/23  1217   POCGLU 107 100 109*       HgbA1C: No results for input(s): LABA1C in the last 72 hours. INR:   Recent Labs     03/07/23  2218   INR 1.30*       Hepatic:   Recent Labs     03/07/23 2131 03/09/23  0553   ALKPHOS 69 60   ALT <5* <5*   AST 18 15   PROT 8.4* 6.5   BILITOT 0.4 0.4   LABALBU 4.1 3.4*       Amylase and Lipase:  Recent Labs     03/08/23  0929   LACTA 0.7       Lactic Acid:   Recent Labs     03/08/23  0929   LACTA 0.7       Troponin: No results for input(s): CKTOTAL, CKMB, TROPONINI in the last 72 hours. BNP: No results for input(s): BNP in the last 72 hours.     CULTURES:   UA:   Recent Labs     03/08/23  0500   PHUR 6.5   COLORU ORANGE*   PROTEINU >= 300*   BLOODU LARGE*   RBCUA    WBCUA 15-25   BACTERIA NONE SEEN   NITRU NEGATIVE GLUCOSEU NEGATIVE   BILIRUBINUR NEGATIVE   UROBILINOGEN 0.2   KETUA NEGATIVE       Micro:   Lab Results   Component Value Date/Time    BC No growth 24 hours. No growth 48 hours. 03/07/2023 10:18 PM    BC No growth 24 hours. No growth 48 hours.  03/07/2023 10:18 PM          Problem list of patient:     Patient Active Problem List   Diagnosis Code    Allergy to bee sting Z91.030    Obesity, Class III, BMI 40-49.9 (morbid obesity) ([facility]) E66.01    Weight gain R63.5    Primary hypertension I10    Rheumatoid arteritis ([facility]) M05.20    Hypothyroidism E03.9    Gastroenteritis K52.9    Obstructive sleep apnea G47.33    Small intestinal bacterial overgrowth K63.89    Acute diarrhea R19.7    Generalized abdominal pain R10.84    Nausea and vomiting R11.2    Hepatomegaly R16.0    Ileus ([facility]) K56.7    Hypokalemia E87.6    S/P partial resection of colon Z90.49    S/P laparotomy Z98.890    Acute renal failure superimposed on stage 5 chronic kidney disease, not on chronic dialysis ([facility]) N17.9, N18.5    Hypernatremia E87.0    Serum potassium decreased E87.6    Other headache syndrome G44.89    RLS (restless legs syndrome) G25.81    Psychophysiological insomnia F51.04    Angina, class III ([facility]) I20.9    Acute respiratory failure with hypoxia ([facility]) J96.01    Dyspnea R06.00    CHF (NYHA class III, ACC/AHA stage C) ([facility]) I50.9    Nonhealing surgical wound T81.89XA    Diabetes mellitus ([facility]) E11.9    CAROLE (acute kidney injury) ([facility] [address] N17.9    Acute hypoxemic respiratory failure ([facility]) J96.01    Acute on chronic congestive heart failure ([facility]) I50.9    Stage 3a chronic kidney disease (HCC) N18.31    Anemia of chronic renal failure N18.9, D63.1    CHF (congestive heart failure), NYHA class III, acute on chronic, combined ([facility]) I50.43    CHF (congestive heart failure), NYHA class II, chronic, diastolic ([facility]) R65.71    Chronic kidney disease, stage IV (severe) ([facility]) N18.4    Hyperkalemia E87.5    ATN (acute tubular necrosis) ([facility]) N17.0    Stage 4 chronic kidney disease (HCC) N18.4    Generalized weakness R53.1    Elevated troponin R77.8    ESRD on dialysis ([facility]) N18.6, Z99.2    Hypercalcemia E83.52         ASSESSMENT/PLAN   Generalized weakness with tremors: improved  ESRD on Home HD  Recent cystoscopy: has lower UTI symptoms. Urine cx negative  Ok with discharge plan  Advised to report if he has fever .      Marcelle Garcia MD, MD, Rikki Pizarro 3/10/2023 12:28 PM

## 2023-03-10 NOTE — FLOWSHEET NOTE
03/10/23 1444   Vital Signs   /71   Temp 98.1 °F (36.7 °C)   Heart Rate 60   Resp 16   Weight 295 lb 13.7 oz (134.2 kg)   Weight Method Bed scale   Percent Weight Change -0.81   Post-Hemodialysis Assessment   Post-Treatment Procedures Blood returned;Catheter Capped, clamped with Saline x2 ports   Machine Disinfection Process Acid/Vinegar Clean;Heat Disinfect; Exterior Machine Disinfection   Rinseback Volume (ml) 400 ml   Blood Volume Processed (Liters) 60.3 l/min   Dialyzer Clearance Lightly streaked   Duration of Treatment (minutes) 180 minutes   Heparin Amount Administered During Treatment (mL) 0 mL   Hemodialysis Intake (ml) 400 ml   Hemodialysis Output (ml) 1400 ml   NET Removed (ml) 1000   stable 3 hour treatment. 1 liter net uf. cath lines flushed with 0.9 ns, clamped and tegos in place. report called to primary nurse.

## 2023-03-10 NOTE — DISCHARGE SUMMARY
Hospital Medicine Discharge Summary      Patient Identification:   Marielena Razo   : 1960  MRN: [id number]   Account: [de-identified]      Patient's PCP: Mykel Venegas DO    Admit Date: 3/7/2023     Discharge Date:   3/10/2023    Admitting Physician: Nicole Wyatt MD     Discharging Nurse Practitioner: Zackary Aceves APRN - CNP     Discharge Diagnoses with Assessment/Plan:  Generalized weakness--afebrile; normal white count and differential; fully alert and oriented; did have a recent cystoscopy and circumcision on 2023; vancomycin on 3/8 x 1 dose; Maxipime 3/9; appreciate infectious disease input; cultures so far showing no growth to date; urinalysis obtained on admission shows trace amount of leukocytes, negative nitrites and no bacteria; participated with therapy well  Possible early volume overload noted on chest x-ray from 3/8/2023--had HD 3/8, 3/10; appreciate nephrology input~is to continue his home dialysis treatment plan  Recent cystoscopy, circumcision, dorsal penile nerve block on 2023 with Dr. Iline Phalen  End-stage renal disease on home HD--appreciate nephrology input,  HD 3/8; planning HD today; per nephrology note he is to continue his home dialysis treatment plan   Hyponatremia--mild, resolved  Lactic acidosis--resolved  Diabetes mellitus type 2, controlled--not on any home medications; hemoglobin A1c noted to be 4.8 on 2023  Primary hypertension, uncontrolled--Coreg; stable, monitor  Hypothyroidism--TSH at 3.970  LAURIE--CPAP  Chronic normocytic anemia--stable; Retacrit x1 dose per nephrology today  Chronic low back pain--heating pad as needed and protect the skin, patient follows with pain management, patient has Percocet at home  Chronically elevated troponin levels  Old lacunar infarct of the right inferior basal ganglia--noted on CT head on admission; no deficits  Diabetic neuropathy--Neurontin  Obesity class II with BMI 39.35     The patient was seen and examined on day of discharge and this discharge summary is in conjunction with any daily progress note from day of discharge. Hospital Course:   Yan Hood is a 63 y.o. male admitted to [address] [place] [address] on 3/7/2023 for weakness; Per H&P from 3/8/2023: Devan Cerrato Dano Boyce is a 63 y.o. male with PMHx of see above who presents to [address] [place] [address] with malaise and generalized weakness. Patient performs HD at home for ESRD. He completed a session in the morning with no issues or complications he could recall however as the day went on he felt progressively weaker and run down. He states he became so weak he could not get out if his chair and eventually decided to come to the ED. He states he has been having chills and subjective fevers as the day went on. He endorses a dry cough in addition to his other symptoms. He states he has acute back pain more severe and different than his chronic pain but denies any numbness and tingling. He states he was also shaking when he tried to hold an object or drink a cup of coffee. \"     3/8--> hemodynamically stable, did have an isolated O2 sat of 85% but nothing further and was actually 96% on room air when he came in; patient was seen in HD; old records reviewed     3/9--> hemodynamically stable, remains afebrile; infectious disease note reviewed and vancomycin stopped, planning HD tomorrow     3/10--> hemodynamically stable; nephrology note reviewed; planning HD today; nephrology and infectious disease are okay with discharge plan, per infectious disease note he is to report if he has any fever, per nephrology note he is to continue his home hemodialysis treatment plan, patient states he feels much better, he participated well with therapy, he offers no complaints, at this time he will be discharged home, he did receive HD today.              Exam:     Vitals:  Vitals:    03/10/23 0522 03/10/23 0553 03/10/23 0840 03/10/23 1125   BP: 107/66  114/65 130/75 Pulse: 62  73 65   Resp: 16 18 18 18   Temp: 97.4 °F (36.3 °C)  98.1 °F (36.7 °C) (!) 96.6 °F (35.9 °C)   TempSrc: Oral  Oral    SpO2: 98%  93%    Weight: 297 lb 6.4 oz (134.9 kg)   298 lb 4.5 oz (135.3 kg)   Height:         Weight: Weight: 298 lb 4.5 oz (135.3 kg)     24 hour intake/output:  Intake/Output Summary (Last 24 hours) at 3/10/2023 1310  Last data filed at 3/10/2023 0900  Gross per 24 hour   Intake 480 ml   Output 650 ml   Net -170 ml         General appearance:  No apparent distress, appears stated age and cooperative. HEENT:  Normal cephalic, atraumatic without obvious deformity. Pupils equal, round, and reactive to light. Conjunctivae/corneas clear. Neck: Supple, with full range of motion. No jugular venous distention. Trachea midline. Respiratory:  Normal respiratory effort. Clear to auscultation, bilaterally without Rales/Wheezes/Rhonchi. Cardiovascular:  Regular rate and rhythm with normal S1/S2 without murmurs, rubs or gallops. Abdomen: Soft, non-tender, non-distended with normal bowel sounds. Musculoskeletal:  No clubbing, cyanosis or edema bilaterally. Full range of motion without deformity. Skin: Skin color, texture, turgor normal.    Neurologic:  Neurovascularly intact without any focal sensory/motor deficits. Cranial nerves: II-XII intact, grossly non-focal.  Psychiatric:  Alert and oriented, thought content appropriate  Capillary Refill: Brisk,< 3 seconds   Peripheral Pulses: +2 palpable, equal bilaterally       Labs:  For convenience and continuity at follow-up the following most recent labs are provided:      CBC:    Lab Results   Component Value Date/Time    WBC 4.6 03/10/2023 06:19 AM    HGB 8.6 03/10/2023 06:19 AM    HCT 28.1 03/10/2023 06:19 AM     03/10/2023 06:19 AM       Renal:    Lab Results   Component Value Date/Time     03/10/2023 06:19 AM    K 4.2 03/10/2023 06:19 AM    CL 98 03/10/2023 06:19 AM    CO2 22 03/10/2023 06:19 AM    BUN 51 03/10/2023 06:19 AM CREATININE 8.7 03/10/2023 06:19 AM    CALCIUM 9.7 03/10/2023 06:19 AM    PHOS 4.7 03/09/2023 05:53 AM       Cardiac:   Recent Labs     03/07/23  2131 03/08/23  0155   TROPONINT 0.061* 0.036*       Significant Diagnostic Studies    Radiology:   XR CHEST PORTABLE   Final Result   Cardiomegaly with possible early volume overload. This document has been electronically signed by: Binta Hooks MD on 03/08/2023 01:39 AM      CT Head W/O Contrast   Final Result   Suspected small old lacunar infarct of the right inferior basal ganglia. Otherwise no acute findings. This document has been electronically signed by: Binta Hooks MD on 03/08/2023 01:21 AM      All CTs at this facility use dose modulation techniques and iterative    reconstructions, and/or weight-based dosing   when appropriate to reduce radiation to a low as reasonably achievable. Consults:     IP CONSULT TO NEPHROLOGY  IP CONSULT TO PHARMACY  IP CONSULT TO INFECTIOUS DISEASES    Disposition:    [x] Home       [] TCU       [] Rehab       [] Psych       [] SNF       [] [place]       [] Other-    Condition at Discharge: Stable    Code Status:  Full Code     Pending tests at discharge:      Blood culture showing no growth at 48 hours    Patient Instructions:    Discharge lab work: None  Activity: activity as tolerated  Diet: ADULT DIET; Regular; Low Sodium (2 gm); Low Potassium (Less than 3000 mg/day); Low Phosphorus (Less than 1000 mg); Less than 60 gm; 1800 ml      Follow-up visits:   Elfida Ahumada, DO [facility] [address] [facility]  [phone]    Follow up in 1 week(s)           Discharge Medications:        Medication List        CHANGE how you take these medications      carvedilol 25 MG tablet  Commonly known as: COREG  What changed: Another medication with the same name was removed.  Continue taking this medication, and follow the directions you see here.     gabapentin 300 MG capsule  Commonly known as: NEURONTIN  Take 1 capsule by mouth daily for 30 days. What changed: when to take this     rOPINIRole 0.5 MG tablet  Commonly known as: REQUIP  take 1 tablet by mouth IN THE AFTERNOON AND 2 TABLETS AT NIGHT. What changed: See the new instructions. CONTINUE taking these medications      albuterol sulfate  (90 Base) MCG/ACT inhaler  Commonly known as: PROVENTIL;VENTOLIN;PROAIR  Inhale 2 puffs into the lungs every 6 hours as needed for Wheezing or Shortness of Breath     aspirin EC 81 MG EC tablet     CPAP Machine Misc  by Does not apply route Please change bipap to 18/14 cm H20.     doxepin 25 MG capsule  Commonly known as: SINEQUAN  Take 1 capsule by mouth nightly     ipratropium-albuterol 0.5-2.5 (3) MG/3ML Soln nebulizer solution  Commonly known as: DUONEB  Inhale 3 mLs into the lungs every 4 hours as needed for Shortness of Breath     levothyroxine 175 MCG tablet  Commonly known as: SYNTHROID  Take 1 tablet by mouth Daily     melatonin 3 MG Tabs tablet  Take 1.5 tablets by mouth nightly as needed (Sleep)     MIRCERA IJ     mometasone-formoterol 100-5 MCG/ACT inhaler  Commonly known as: DULERA     montelukast 10 MG tablet  Commonly known as: SINGULAIR     nitroGLYCERIN 0.4 MG SL tablet  Commonly known as: Nitrostat  Place 1 tablet under the tongue every 5 minutes as needed for Chest pain (up to 3 doses)     oxyCODONE-acetaminophen  MG per tablet  Commonly known as: Percocet  Take 1 tablet by mouth every 8 hours as needed for Pain for up to 30 days.  Intended supply: 30 days     sevelamer 800 MG tablet  Commonly known as: RENVELA     tamsulosin 0.4 MG capsule  Commonly known as: Flomax  Take 1 capsule by mouth daily for 10 days For stent pain and passage of small fragments     VITAMIN D PO            STOP taking these medications      amLODIPine 10 MG tablet  Commonly known as: NORVASC     azelastine 0.1 % nasal spray  Commonly known as: ASTELIN     bumetanide 1 MG tablet  Commonly known as: BUMEX     cephALEXin 500 MG capsule  Commonly known as: Keflex     glimepiride 4 MG tablet  Commonly known as: AMARYL     potassium chloride 10 MEQ extended release tablet  Commonly known as: K-Tab            ASK your doctor about these medications      naloxone 4 MG/0.1ML Liqd nasal spray              Time Spent on discharge is more than 45 minutes in the examination, evaluation, counseling and review of medications and discharge plan. Signed: Thank you Lennox Farnsworth DO for the opportunity to be involved in this patient's care.     Electronically signed by SAHIL Jauregui CNP on 3/10/2023 at 1:10 PM

## 2023-03-10 NOTE — CARE COORDINATION
3/10/23, 1:57 PM EST    Patient goals/plan/ treatment preferences discussed by  and . Patient goals/plan/ treatment preferences reviewed with patient/ family. Patient/ family verbalize understanding of discharge plan and are in agreement with goal/plan/treatment preferences. Understanding was demonstrated using the teach back method. AVS provided by RN at time of discharge, which includes all necessary medical information pertaining to the patients current course of illness, treatment, post-discharge goals of care, and treatment preferences. Services At/After Discharge: None       IMM Letter  IMM Letter given to Patient/Family/Significant other/Guardian/POA/by[de-identified] Tonia MATUTE Case Manager. IMM Letter date given[de-identified] 03/10/23  IMM Letter time given[de-identified] 3857     Discharge home today with spouse and continued Home Hemodialysis.

## 2023-03-10 NOTE — PROGRESS NOTES
[facility]   Nephrology progress note  3/10/2023, 8:10 AM      Pt Name:    Mustapha Lazcano  MRN:     [id number]     YOB: 1960  Admit Date:    3/7/2023  9:10 PM    Chief Complaint: Nephrology following for ESRD and hemodialysis    Subjective:  Patient was seen and examined this morning  Doing better  No CP or shortness of breath      Objective:  24HR INTAKE/OUTPUT:    Intake/Output Summary (Last 24 hours) at 3/10/2023 0810  Last data filed at 3/9/2023 1534  Gross per 24 hour   Intake 50 ml   Output 400 ml   Net -350 ml           I/O last 3 completed shifts: In: 48 [IV Piggyback:50]  Out: 400 [Urine:400]  No intake/output data recorded.    Admission weight: 295 lb (133.8 kg)  Wt Readings from Last 3 Encounters:   03/10/23 297 lb 6.4 oz (134.9 kg)   03/02/23 300 lb (136.1 kg)   02/10/23 (!) 305 lb 9.6 oz (138.6 kg)        Vitals :   Vitals:    03/09/23 1950 03/09/23 2020 03/10/23 0522 03/10/23 0553   BP: (!) 122/90  107/66    Pulse: 66  62    Resp: 18 18 16 18   Temp: 97.6 °F (36.4 °C)  97.4 °F (36.3 °C)    TempSrc: Oral  Oral    SpO2: 98%  98%    Weight:   297 lb 6.4 oz (134.9 kg)    Height:           Physical examination  General Appearance: alert and cooperative with exam, appears comfortable, no distress  Mouth/Throat: Oral mucosa moist  Neck: No JVD  Lungs:  no use of accessory muscles  Heart:  S1, S2 heard  GI: soft, non-tender, no guarding    Medications:  Infusion:    sodium chloride 5 mL/hr at 03/09/23 0514    dextrose       Meds:    carvedilol  25 mg Oral BID WC    Or    carvedilol  3.125 mg Oral BID WC    sodium chloride flush  10 mL IntraVENous 2 times per day    heparin (porcine)  5,000 Units SubCUTAneous 3 times per day    aspirin EC  81 mg Oral Daily    doxepin  25 mg Oral Nightly    gabapentin  300 mg Oral Nightly    mometasone-formoterol  2 puff Inhalation BID    montelukast  10 mg Oral Nightly    rOPINIRole  0.5 mg Oral Nightly    sevelamer  800 mg Oral TID WC tamsulosin  0.4 mg Oral Daily    levothyroxine  175 mcg Oral Daily    cefepime  1,000 mg IntraVENous Q24H     Meds prn: oxyCODONE-acetaminophen, sodium chloride flush, sodium chloride, ondansetron **OR** ondansetron, polyethylene glycol, acetaminophen **OR** acetaminophen, melatonin, nitroGLYCERIN, HYDROmorphone **OR** HYDROmorphone, albuterol sulfate HFA, glucose, dextrose bolus **OR** dextrose bolus, glucagon (rDNA), dextrose     Lab Data :  CBC:   Recent Labs     03/07/23 2131 03/09/23  0553 03/10/23  0619   WBC 5.3 4.4* 4.6*   HGB 10.6* 8.4* 8.6*   HCT 31.0* 26.1* 28.1*    158 193       CMP:  Recent Labs     03/07/23 2131 03/09/23  0553 03/10/23  0619   * 138 138   K 4.1 4.1 4.2   CL 92* 99 98   CO2 29 26 22*   BUN 39* 36* 51*   CREATININE 5.8* 6.4* 8.7*   GLUCOSE 96 90 79   CALCIUM 11.2* 9.2 9.7   PHOS  --  4.7  --        Hepatic:   Recent Labs     03/07/23 2131 03/09/23  0553   LABALBU 4.1 3.4*   AST 18 15   ALT <5* <5*   BILITOT 0.4 0.4   ALKPHOS 69 60           Assessment and Plan:  ESRD on hemodialysis  Plan HD today  Volume status and lytes okay  Continue with maintenance HD 3x week while inpatient  Will resume home HD per usual outpatient scheduled when discharged  Anemia in ESRD: add retacrit today  Hypercalcemia. Improved  Recent cystoscopy  Renal osteodystrophy. Continue with phosphate binders    D/W patient     Lyudmila Eubanks MD  [facility]    This report has been created using voice recognition software.  It may contain minor errors which are inherent in voice recognition technology

## 2023-03-13 LAB
BACTERIA BLD AEROBE CULT: NORMAL
BACTERIA BLD AEROBE CULT: NORMAL

## 2023-03-14 ENCOUNTER — HOSPITAL ENCOUNTER (OUTPATIENT)
Dept: GENERAL RADIOLOGY | Age: 63
Discharge: HOME | End: 2023-03-14
Payer: MEDICARE

## 2023-03-14 ENCOUNTER — HOSPITAL ENCOUNTER (OUTPATIENT)
Age: 63
Discharge: HOME | End: 2023-03-14
Payer: MEDICARE

## 2023-03-14 DIAGNOSIS — N18.6 END STAGE RENAL DISEASE (HCC): ICD-10-CM

## 2023-03-14 DIAGNOSIS — G89.4 CHRONIC PAIN SYNDROME: ICD-10-CM

## 2023-03-14 PROCEDURE — 71046 X-RAY EXAM CHEST 2 VIEWS: CPT

## 2023-03-14 RX ORDER — GABAPENTIN 300 MG/1
300 CAPSULE ORAL DAILY
Qty: 30 CAPSULE | Refills: 0 | OUTPATIENT
Start: 2023-03-14 | End: 2023-04-13

## 2023-03-15 ENCOUNTER — HOSPITAL ENCOUNTER (OUTPATIENT)
Dept: INTERVENTIONAL RADIOLOGY/VASCULAR | Age: 63
Discharge: HOME | End: 2023-03-15
Payer: MEDICARE

## 2023-03-15 DIAGNOSIS — N18.6 ESRD (END STAGE RENAL DISEASE) (HCC): ICD-10-CM

## 2023-03-15 DIAGNOSIS — Z99.2 HEMODIALYSIS PATIENT (HCC): ICD-10-CM

## 2023-03-15 PROCEDURE — 2709999900 IR INTERVENTIONAL RADIOLOGY PROCEDURE REQUEST

## 2023-03-15 PROCEDURE — 36598 INJ W/FLUOR EVAL CV DEVICE: CPT

## 2023-03-15 PROCEDURE — 6360000004 HC RX CONTRAST MEDICATION: Performed by: RADIOLOGY

## 2023-03-15 RX ADMIN — IOPAMIDOL 5 ML: 510 INJECTION, SOLUTION INTRAVASCULAR at 11:25

## 2023-03-15 NOTE — OP NOTE
Department of Radiology  Post Procedure Progress Note      Pre-Procedure Diagnosis:  Renal failure. Tunneled HD CVC eval.    Procedure Performed:  CVC evaluation    Anesthesia: none    Findings: CVC is positioned and functioning properly. Immediate Complications:  None    Estimated Blood Loss: minimal    SEE DICTATED PROCEDURE NOTE FOR COMPLETE DETAILS.     Electronically signed by Leyla Acuña MD on 3/15/2023 at 11:27 AM

## 2023-03-15 NOTE — PROGRESS NOTES
1110 Pt arrived in IR for catheter check  1124 Catheter check complete. 1130 Pt released in satisfactory condition to self, ambulatory with cane. Skin pink, warm, dry.  Respirations even and unlabored at rest.

## 2023-03-15 NOTE — H&P
Formulation and discussion of sedation / procedure plans, risks, benefits, side effects and alternatives with patient and/or responsible adult completed.     Electronically signed by Haylee Love MD on 3/15/2023 at 11:26 AM

## 2023-03-24 ENCOUNTER — APPOINTMENT (OUTPATIENT)
Dept: GENERAL RADIOLOGY | Age: 63
End: 2023-03-24
Payer: MEDICARE

## 2023-03-24 ENCOUNTER — APPOINTMENT (OUTPATIENT)
Dept: CT IMAGING | Age: 63
End: 2023-03-24
Payer: MEDICARE

## 2023-03-24 ENCOUNTER — HOSPITAL ENCOUNTER (INPATIENT)
Age: 63
LOS: 1 days | Discharge: LEFT BEFORE DISCHARGE | End: 2023-03-24
Attending: EMERGENCY MEDICINE | Admitting: INTERNAL MEDICINE
Payer: MEDICARE

## 2023-03-24 VITALS
OXYGEN SATURATION: 100 % | BODY MASS INDEX: 38.57 KG/M2 | HEART RATE: 84 BPM | WEIGHT: 291.01 LBS | HEIGHT: 73 IN | SYSTOLIC BLOOD PRESSURE: 126 MMHG | TEMPERATURE: 98.3 F | RESPIRATION RATE: 18 BRPM | DIASTOLIC BLOOD PRESSURE: 72 MMHG

## 2023-03-24 DIAGNOSIS — I50.9 ACUTE ON CHRONIC CONGESTIVE HEART FAILURE, UNSPECIFIED HEART FAILURE TYPE (HCC): Primary | ICD-10-CM

## 2023-03-24 DIAGNOSIS — J96.01 ACUTE RESPIRATORY FAILURE WITH HYPOXIA (HCC): ICD-10-CM

## 2023-03-24 PROBLEM — E87.70 FLUID OVERLOAD: Status: ACTIVE | Noted: 2023-03-24

## 2023-03-24 LAB
ANION GAP SERPL CALC-SCNC: 16 MEQ/L (ref 8–16)
ARTERIAL PATENCY WRIST A: POSITIVE
BASE EXCESS BLDA CALC-SCNC: 4.9 MMOL/L (ref -2–3)
BASE EXCESS BLDA CALC-SCNC: 5.4 MMOL/L (ref -2.5–2.5)
BASOPHILS ABSOLUTE: 0.1 THOU/MM3 (ref 0–0.1)
BASOPHILS NFR BLD AUTO: 1.1 %
BDY SITE: ABNORMAL
BUN SERPL-MCNC: 39 MG/DL (ref 7–22)
CALCIUM SERPL-MCNC: 10.1 MG/DL (ref 8.5–10.5)
CHLORIDE SERPL-SCNC: 95 MEQ/L (ref 98–111)
CO2 SERPL-SCNC: 27 MEQ/L (ref 23–33)
COLLECTED BY:: ABNORMAL
COLLECTED BY:: ABNORMAL
CREAT SERPL-MCNC: 6.6 MG/DL (ref 0.4–1.2)
D DIMER PPP IA.FEU-MCNC: 1047 NG/ML FEU (ref 0–500)
DEPRECATED MEAN GLUCOSE BLD GHB EST-ACNC: 78 MG/DL (ref 70–126)
DEPRECATED RDW RBC AUTO: 51.7 FL (ref 35–45)
DEVICE: ABNORMAL
DEVICE: ABNORMAL
EKG ATRIAL RATE: 63 BPM
EKG P AXIS: 0 DEGREES
EKG P-R INTERVAL: 182 MS
EKG Q-T INTERVAL: 442 MS
EKG QRS DURATION: 116 MS
EKG QTC CALCULATION (BAZETT): 452 MS
EKG R AXIS: -12 DEGREES
EKG T AXIS: 21 DEGREES
EKG VENTRICULAR RATE: 63 BPM
EOSINOPHIL NFR BLD AUTO: 4.2 %
EOSINOPHILS ABSOLUTE: 0.3 THOU/MM3 (ref 0–0.4)
ERYTHROCYTE [DISTWIDTH] IN BLOOD BY AUTOMATED COUNT: 15 % (ref 11.5–14.5)
FIO2 ON VENT O2 ANALYZER: 2 %
FIO2 ON VENT O2 ANALYZER: 21 %
FLUAV RNA RESP QL NAA+PROBE: NOT DETECTED
FLUBV RNA RESP QL NAA+PROBE: NOT DETECTED
GFR SERPL CREATININE-BSD FRML MDRD: 9 ML/MIN/1.73M2
GLUCOSE SERPL-MCNC: 96 MG/DL (ref 70–108)
HBA1C MFR BLD HPLC: 4.6 % (ref 4.4–6.4)
HCO3 BLDA-SCNC: 29 MMOL/L (ref 23–28)
HCO3 BLDA-SCNC: 31 MMOL/L (ref 23–28)
HCT VFR BLD AUTO: 27.7 % (ref 42–52)
HGB BLD-MCNC: 9.4 GM/DL (ref 14–18)
IMM GRANULOCYTES # BLD AUTO: 0.03 THOU/MM3 (ref 0–0.07)
IMM GRANULOCYTES NFR BLD AUTO: 0.5 %
LV EF: 48 %
LVEF MODALITY: NORMAL
LYMPHOCYTES ABSOLUTE: 0.6 THOU/MM3 (ref 1–4.8)
LYMPHOCYTES NFR BLD AUTO: 9.6 %
MCH RBC QN AUTO: 32 PG (ref 26–33)
MCHC RBC AUTO-ENTMCNC: 33.9 GM/DL (ref 32.2–35.5)
MCV RBC AUTO: 94.2 FL (ref 80–94)
MONOCYTES ABSOLUTE: 0.8 THOU/MM3 (ref 0.4–1.3)
MONOCYTES NFR BLD AUTO: 12.4 %
NEUTROPHILS NFR BLD AUTO: 72.2 %
NRBC BLD AUTO-RTO: 0 /100 WBC
NT-PROBNP SERPL IA-MCNC: 2717 PG/ML (ref 0–124)
OSMOLALITY SERPL CALC.SUM OF ELEC: 284.9 MOSMOL/KG (ref 275–300)
PCO2 BLDA: 47 MMHG (ref 35–45)
PCO2 TEMP ADJ BLDMV: 41 MMHG (ref 41–51)
PH BLDA: 7.42 [PH] (ref 7.35–7.45)
PH BLDMV: 7.46 [PH] (ref 7.31–7.41)
PLATELET # BLD AUTO: 239 THOU/MM3 (ref 130–400)
PMV BLD AUTO: 8.9 FL (ref 9.4–12.4)
PO2 BLDA: 105 MMHG (ref 71–104)
PO2 BLDMV: 51 MMHG (ref 25–40)
POTASSIUM SERPL-SCNC: 4 MEQ/L (ref 3.5–5.2)
RBC # BLD AUTO: 2.94 MILL/MM3 (ref 4.7–6.1)
SAO2 % BLDA: 98 %
SAO2 % BLDMV: 87 %
SARS-COV-2 RNA RESP QL NAA+PROBE: NOT DETECTED
SEGMENTED NEUTROPHILS ABSOLUTE COUNT: 4.7 THOU/MM3 (ref 1.8–7.7)
SODIUM SERPL-SCNC: 138 MEQ/L (ref 135–145)
TROPONIN T: 0.04 NG/ML
TROPONIN T: 0.04 NG/ML
TROPONIN T: 0.05 NG/ML
TROPONIN T: 0.05 NG/ML
TROPONIN T: 0.06 NG/ML
WBC # BLD AUTO: 6.5 THOU/MM3 (ref 4.8–10.8)

## 2023-03-24 PROCEDURE — 36415 COLL VENOUS BLD VENIPUNCTURE: CPT

## 2023-03-24 PROCEDURE — 71046 X-RAY EXAM CHEST 2 VIEWS: CPT

## 2023-03-24 PROCEDURE — 80048 BASIC METABOLIC PNL TOTAL CA: CPT

## 2023-03-24 PROCEDURE — 87636 SARSCOV2 & INF A&B AMP PRB: CPT

## 2023-03-24 PROCEDURE — 6360000002 HC RX W HCPCS: Performed by: STUDENT IN AN ORGANIZED HEALTH CARE EDUCATION/TRAINING PROGRAM

## 2023-03-24 PROCEDURE — 6370000000 HC RX 637 (ALT 250 FOR IP): Performed by: STUDENT IN AN ORGANIZED HEALTH CARE EDUCATION/TRAINING PROGRAM

## 2023-03-24 PROCEDURE — 6370000000 HC RX 637 (ALT 250 FOR IP): Performed by: PHYSICIAN ASSISTANT

## 2023-03-24 PROCEDURE — 85379 FIBRIN DEGRADATION QUANT: CPT

## 2023-03-24 PROCEDURE — 5A1D70Z PERFORMANCE OF URINARY FILTRATION, INTERMITTENT, LESS THAN 6 HOURS PER DAY: ICD-10-PCS | Performed by: INTERNAL MEDICINE

## 2023-03-24 PROCEDURE — 99222 1ST HOSP IP/OBS MODERATE 55: CPT | Performed by: INTERNAL MEDICINE

## 2023-03-24 PROCEDURE — 93306 TTE W/DOPPLER COMPLETE: CPT

## 2023-03-24 PROCEDURE — 6370000000 HC RX 637 (ALT 250 FOR IP): Performed by: HOSPITALIST

## 2023-03-24 PROCEDURE — 99223 1ST HOSP IP/OBS HIGH 75: CPT | Performed by: PHYSICIAN ASSISTANT

## 2023-03-24 PROCEDURE — 90935 HEMODIALYSIS ONE EVALUATION: CPT

## 2023-03-24 PROCEDURE — 82803 BLOOD GASES ANY COMBINATION: CPT

## 2023-03-24 PROCEDURE — 99285 EMERGENCY DEPT VISIT HI MDM: CPT

## 2023-03-24 PROCEDURE — 93010 ELECTROCARDIOGRAM REPORT: CPT | Performed by: INTERNAL MEDICINE

## 2023-03-24 PROCEDURE — 1200000003 HC TELEMETRY R&B

## 2023-03-24 PROCEDURE — 93005 ELECTROCARDIOGRAM TRACING: CPT | Performed by: EMERGENCY MEDICINE

## 2023-03-24 PROCEDURE — 83880 ASSAY OF NATRIURETIC PEPTIDE: CPT

## 2023-03-24 PROCEDURE — 36600 WITHDRAWAL OF ARTERIAL BLOOD: CPT

## 2023-03-24 PROCEDURE — 85025 COMPLETE CBC W/AUTO DIFF WBC: CPT

## 2023-03-24 PROCEDURE — 94640 AIRWAY INHALATION TREATMENT: CPT

## 2023-03-24 PROCEDURE — 96374 THER/PROPH/DIAG INJ IV PUSH: CPT

## 2023-03-24 PROCEDURE — 6360000002 HC RX W HCPCS: Performed by: INTERNAL MEDICINE

## 2023-03-24 PROCEDURE — 84484 ASSAY OF TROPONIN QUANT: CPT

## 2023-03-24 PROCEDURE — 83036 HEMOGLOBIN GLYCOSYLATED A1C: CPT

## 2023-03-24 PROCEDURE — 6360000002 HC RX W HCPCS: Performed by: PHYSICIAN ASSISTANT

## 2023-03-24 RX ORDER — AMINOPHYLLINE DIHYDRATE 25 MG/ML
50 INJECTION, SOLUTION INTRAVENOUS PRN
OUTPATIENT
Start: 2023-03-24 | End: 2023-03-25

## 2023-03-24 RX ORDER — NITROGLYCERIN 0.4 MG/1
0.4 TABLET SUBLINGUAL EVERY 5 MIN PRN
OUTPATIENT
Start: 2023-03-24 | End: 2023-03-25

## 2023-03-24 RX ORDER — ATROPINE SULFATE 0.1 MG/ML
0.5 INJECTION INTRAVENOUS EVERY 5 MIN PRN
OUTPATIENT
Start: 2023-03-24 | End: 2023-03-25

## 2023-03-24 RX ORDER — POLYETHYLENE GLYCOL 3350 17 G/17G
17 POWDER, FOR SOLUTION ORAL DAILY PRN
Status: DISCONTINUED | OUTPATIENT
Start: 2023-03-24 | End: 2023-03-25 | Stop reason: HOSPADM

## 2023-03-24 RX ORDER — ONDANSETRON 2 MG/ML
4 INJECTION INTRAMUSCULAR; INTRAVENOUS EVERY 6 HOURS PRN
Status: DISCONTINUED | OUTPATIENT
Start: 2023-03-24 | End: 2023-03-25 | Stop reason: HOSPADM

## 2023-03-24 RX ORDER — HEPARIN SODIUM 5000 [USP'U]/ML
5000 INJECTION, SOLUTION INTRAVENOUS; SUBCUTANEOUS EVERY 8 HOURS SCHEDULED
Status: DISCONTINUED | OUTPATIENT
Start: 2023-03-24 | End: 2023-03-25 | Stop reason: HOSPADM

## 2023-03-24 RX ORDER — OXYCODONE HYDROCHLORIDE AND ACETAMINOPHEN 5; 325 MG/1; MG/1
1 TABLET ORAL ONCE
Status: COMPLETED | OUTPATIENT
Start: 2023-03-24 | End: 2023-03-24

## 2023-03-24 RX ORDER — ALBUTEROL SULFATE 90 UG/1
2 AEROSOL, METERED RESPIRATORY (INHALATION) EVERY 6 HOURS PRN
Status: DISCONTINUED | OUTPATIENT
Start: 2023-03-24 | End: 2023-03-25 | Stop reason: HOSPADM

## 2023-03-24 RX ORDER — ONDANSETRON 4 MG/1
4 TABLET, ORALLY DISINTEGRATING ORAL EVERY 8 HOURS PRN
Status: DISCONTINUED | OUTPATIENT
Start: 2023-03-24 | End: 2023-03-25 | Stop reason: HOSPADM

## 2023-03-24 RX ORDER — SODIUM CHLORIDE 9 MG/ML
INJECTION, SOLUTION INTRAVENOUS PRN
Status: DISCONTINUED | OUTPATIENT
Start: 2023-03-24 | End: 2023-03-25 | Stop reason: HOSPADM

## 2023-03-24 RX ORDER — ONDANSETRON 2 MG/ML
4 INJECTION INTRAMUSCULAR; INTRAVENOUS ONCE
Status: COMPLETED | OUTPATIENT
Start: 2023-03-24 | End: 2023-03-24

## 2023-03-24 RX ORDER — GABAPENTIN 300 MG/1
300 CAPSULE ORAL NIGHTLY
Status: DISCONTINUED | OUTPATIENT
Start: 2023-03-24 | End: 2023-03-25 | Stop reason: HOSPADM

## 2023-03-24 RX ORDER — CARVEDILOL 25 MG/1
25 TABLET ORAL 2 TIMES DAILY
Status: DISCONTINUED | OUTPATIENT
Start: 2023-03-24 | End: 2023-03-25 | Stop reason: HOSPADM

## 2023-03-24 RX ORDER — IPRATROPIUM BROMIDE AND ALBUTEROL SULFATE 2.5; .5 MG/3ML; MG/3ML
1 SOLUTION RESPIRATORY (INHALATION) EVERY 4 HOURS PRN
Status: DISCONTINUED | OUTPATIENT
Start: 2023-03-24 | End: 2023-03-24 | Stop reason: CLARIF

## 2023-03-24 RX ORDER — SODIUM CHLORIDE 9 MG/ML
500 INJECTION, SOLUTION INTRAVENOUS CONTINUOUS PRN
OUTPATIENT
Start: 2023-03-24 | End: 2023-03-25

## 2023-03-24 RX ORDER — ALBUTEROL SULFATE 90 UG/1
2 AEROSOL, METERED RESPIRATORY (INHALATION) EVERY 4 HOURS PRN
Status: DISCONTINUED | OUTPATIENT
Start: 2023-03-24 | End: 2023-03-25 | Stop reason: HOSPADM

## 2023-03-24 RX ORDER — METOPROLOL TARTRATE 5 MG/5ML
5 INJECTION INTRAVENOUS EVERY 5 MIN PRN
OUTPATIENT
Start: 2023-03-24 | End: 2023-03-25

## 2023-03-24 RX ORDER — SODIUM CHLORIDE 0.9 % (FLUSH) 0.9 %
5-40 SYRINGE (ML) INJECTION PRN
OUTPATIENT
Start: 2023-03-24 | End: 2023-03-25

## 2023-03-24 RX ORDER — ACETAMINOPHEN 650 MG/1
650 SUPPOSITORY RECTAL EVERY 6 HOURS PRN
Status: DISCONTINUED | OUTPATIENT
Start: 2023-03-24 | End: 2023-03-25 | Stop reason: HOSPADM

## 2023-03-24 RX ORDER — SEVELAMER CARBONATE 800 MG/1
800 TABLET, FILM COATED ORAL
Status: DISCONTINUED | OUTPATIENT
Start: 2023-03-24 | End: 2023-03-25 | Stop reason: HOSPADM

## 2023-03-24 RX ORDER — DOXEPIN HYDROCHLORIDE 25 MG/1
25 CAPSULE ORAL NIGHTLY
Status: DISCONTINUED | OUTPATIENT
Start: 2023-03-24 | End: 2023-03-25 | Stop reason: HOSPADM

## 2023-03-24 RX ORDER — SODIUM CHLORIDE 0.9 % (FLUSH) 0.9 %
5-40 SYRINGE (ML) INJECTION EVERY 12 HOURS SCHEDULED
Status: DISCONTINUED | OUTPATIENT
Start: 2023-03-24 | End: 2023-03-25 | Stop reason: HOSPADM

## 2023-03-24 RX ORDER — MONTELUKAST SODIUM 10 MG/1
10 TABLET ORAL NIGHTLY
Status: DISCONTINUED | OUTPATIENT
Start: 2023-03-24 | End: 2023-03-25 | Stop reason: HOSPADM

## 2023-03-24 RX ORDER — HYDROCODONE BITARTRATE AND ACETAMINOPHEN 5; 325 MG/1; MG/1
1 TABLET ORAL EVERY 6 HOURS PRN
Status: DISCONTINUED | OUTPATIENT
Start: 2023-03-24 | End: 2023-03-24

## 2023-03-24 RX ORDER — ACETAMINOPHEN 325 MG/1
650 TABLET ORAL EVERY 6 HOURS PRN
Status: DISCONTINUED | OUTPATIENT
Start: 2023-03-24 | End: 2023-03-25 | Stop reason: HOSPADM

## 2023-03-24 RX ORDER — OXYCODONE HYDROCHLORIDE AND ACETAMINOPHEN 5; 325 MG/1; MG/1
1 TABLET ORAL EVERY 6 HOURS PRN
Status: DISCONTINUED | OUTPATIENT
Start: 2023-03-24 | End: 2023-03-25 | Stop reason: HOSPADM

## 2023-03-24 RX ORDER — ASPIRIN 81 MG/1
81 TABLET ORAL DAILY
Status: DISCONTINUED | OUTPATIENT
Start: 2023-03-24 | End: 2023-03-25 | Stop reason: HOSPADM

## 2023-03-24 RX ORDER — ALBUTEROL SULFATE 90 UG/1
2 AEROSOL, METERED RESPIRATORY (INHALATION) PRN
OUTPATIENT
Start: 2023-03-24 | End: 2023-03-25

## 2023-03-24 RX ORDER — SODIUM CHLORIDE 0.9 % (FLUSH) 0.9 %
5-40 SYRINGE (ML) INJECTION PRN
Status: DISCONTINUED | OUTPATIENT
Start: 2023-03-24 | End: 2023-03-25 | Stop reason: HOSPADM

## 2023-03-24 RX ORDER — ROPINIROLE 0.5 MG/1
0.5 TABLET, FILM COATED ORAL NIGHTLY
Status: DISCONTINUED | OUTPATIENT
Start: 2023-03-24 | End: 2023-03-25 | Stop reason: HOSPADM

## 2023-03-24 RX ADMIN — MOMETASONE FUROATE AND FORMOTEROL FUMARATE DIHYDRATE 2 PUFF: 100; 5 AEROSOL RESPIRATORY (INHALATION) at 08:51

## 2023-03-24 RX ADMIN — ONDANSETRON 4 MG: 2 INJECTION INTRAMUSCULAR; INTRAVENOUS at 02:00

## 2023-03-24 RX ADMIN — ACETAMINOPHEN 650 MG: 325 TABLET ORAL at 09:25

## 2023-03-24 RX ADMIN — EPOETIN ALFA-EPBX 6000 UNITS: 3000 INJECTION, SOLUTION INTRAVENOUS; SUBCUTANEOUS at 16:27

## 2023-03-24 RX ADMIN — SEVELAMER CARBONATE 800 MG: 800 TABLET, FILM COATED ORAL at 16:26

## 2023-03-24 RX ADMIN — HYDROCODONE BITARTRATE AND ACETAMINOPHEN 1 TABLET: 5; 325 TABLET ORAL at 12:18

## 2023-03-24 RX ADMIN — CARVEDILOL 25 MG: 25 TABLET, FILM COATED ORAL at 09:29

## 2023-03-24 RX ADMIN — SEVELAMER CARBONATE 800 MG: 800 TABLET, FILM COATED ORAL at 09:28

## 2023-03-24 RX ADMIN — ASPIRIN 81 MG: 81 TABLET, COATED ORAL at 09:28

## 2023-03-24 RX ADMIN — OXYCODONE AND ACETAMINOPHEN 1 TABLET: 5; 325 TABLET ORAL at 03:55

## 2023-03-24 RX ADMIN — HEPARIN SODIUM 5000 UNITS: 5000 INJECTION INTRAVENOUS; SUBCUTANEOUS at 05:16

## 2023-03-24 RX ADMIN — LEVOTHYROXINE SODIUM 175 MCG: 0.03 TABLET ORAL at 05:36

## 2023-03-24 ASSESSMENT — ENCOUNTER SYMPTOMS
ALLERGIC/IMMUNOLOGIC NEGATIVE: 1
GASTROINTESTINAL NEGATIVE: 1
COUGH: 0
EYES NEGATIVE: 1
SHORTNESS OF BREATH: 1

## 2023-03-24 ASSESSMENT — PAIN DESCRIPTION - LOCATION
LOCATION: BACK

## 2023-03-24 ASSESSMENT — PAIN - FUNCTIONAL ASSESSMENT
PAIN_FUNCTIONAL_ASSESSMENT: PREVENTS OR INTERFERES WITH ALL ACTIVE AND SOME PASSIVE ACTIVITIES
PAIN_FUNCTIONAL_ASSESSMENT: ACTIVITIES ARE NOT PREVENTED
PAIN_FUNCTIONAL_ASSESSMENT: NONE - DENIES PAIN

## 2023-03-24 ASSESSMENT — PAIN SCALES - GENERAL
PAINLEVEL_OUTOF10: 5
PAINLEVEL_OUTOF10: 8
PAINLEVEL_OUTOF10: 5
PAINLEVEL_OUTOF10: 3
PAINLEVEL_OUTOF10: 7
PAINLEVEL_OUTOF10: 8

## 2023-03-24 ASSESSMENT — PAIN DESCRIPTION - DESCRIPTORS
DESCRIPTORS: THROBBING
DESCRIPTORS: ACHING;CRUSHING
DESCRIPTORS: ACHING
DESCRIPTORS: ACHING;THROBBING
DESCRIPTORS: ACHING

## 2023-03-24 ASSESSMENT — PAIN DESCRIPTION - ORIENTATION
ORIENTATION: LOWER
ORIENTATION: LOWER

## 2023-03-24 ASSESSMENT — PAIN DESCRIPTION - PAIN TYPE: TYPE: CHRONIC PAIN

## 2023-03-24 ASSESSMENT — PAIN DESCRIPTION - ONSET: ONSET: ON-GOING

## 2023-03-24 ASSESSMENT — PAIN DESCRIPTION - FREQUENCY: FREQUENCY: CONTINUOUS

## 2023-03-24 NOTE — CONSULTS
Housing Stability: Not on file       Home Meds:  Prior to Admission medications    Medication Sig Start Date End Date Taking? Authorizing Provider   tamsulosin (FLOMAX) 0.4 MG capsule Take 1 capsule by mouth daily for 10 days For stent pain and passage of small fragments 3/2/23 3/24/23  Kaleigh Jimenez MD   sevelamer (RENVELA) 800 MG tablet Take 1 tablet by mouth 3 times daily (with meals)    Historical Provider, MD   doxepin (SINEQUAN) 25 MG capsule Take 1 capsule by mouth nightly 2/10/23   SAHIL Rivera CNP   gabapentin (NEURONTIN) 300 MG capsule Take 1 capsule by mouth daily for 30 days. Patient taking differently: Take 300 mg by mouth nightly. 2/10/23 3/24/23  SAHIL Mathur CNP   Methoxy PEG-Epoetin Beta (MIRCERA IJ) Inject 200 mcg into the skin every 14 days Last dose last Wednesday 12/30/22   Historical Provider, MD   mometasone-formoterol (DULERA) 100-5 MCG/ACT inhaler Inhale 2 puffs into the lungs in the morning and at bedtime 8/18/22   Historical Provider, MD   montelukast (SINGULAIR) 10 MG tablet Take 10 mg by mouth nightly 10/26/22   Historical Provider, MD   naloxone 4 MG/0.1ML LIQD nasal spray 1 spray by Nasal route as needed  Patient not taking: No sig reported 12/27/22   Historical Provider, MD   carvedilol (COREG) 25 MG tablet Take 25 mg by mouth 2 times daily As need for High BP 1/9/23   Historical Provider, MD   VITAMIN D PO Take 1 tablet by mouth daily Pt states doesn't state it on bottle how much    Historical Provider, MD   rOPINIRole (REQUIP) 0.5 MG tablet take 1 tablet by mouth IN THE AFTERNOON AND 2 TABLETS AT NIGHT. Patient taking differently: Take 0.5 mg by mouth nightly take 1 tablet by mouth IN THE AFTERNOON AND 2 TABLETS AT NIGHT.  11/1/22   Adrian Diaz PA-C   potassium chloride (K-TAB) 10 MEQ extended release tablet Take 2 tablets by mouth in the morning. 7/22/22 8/17/22  Jennifer Jose MD   bumetanide (BUMEX) 1 MG tablet Take 1 tablet by mouth in [facility]    This report has been created using voice recognition software. It may contain minor errors which are inherent in voice recognition technology.

## 2023-03-24 NOTE — FLOWSHEET NOTE
Stable 4 hour treatment complete. Removed 3 liter of fluid as per order. Tolerated fluid removal well. HD catheter ports flushed, clamped and capped. Dressing clean, dry and intact. Report given to primary RN. Treatment record printed for scanning into EMR.   03/24/23 1135 03/24/23 1556   Vital Signs   /73 117/76   Temp 97.6 °F (36.4 °C) 97.6 °F (36.4 °C)   Heart Rate 64 64   Resp 10  --    SpO2 100 % 94 %   Weight 297 lb 9.9 oz (135 kg) 291 lb 0.1 oz (132 kg)   Weight Method Bed scale Bed scale   Percent Weight Change  --  -2.22   Post-Hemodialysis Assessment   Post-Treatment Procedures  --  Blood returned;Catheter Capped, clamped with Saline x2 ports   Machine Disinfection Process  --  Acid/Vinegar Clean;Heat Disinfect; Exterior Machine Disinfection   Blood Volume Processed (Liters)  --  79.6 l/min   Dialyzer Clearance  --  Lightly streaked   Duration of Treatment (minutes)  --  240 minutes   Heparin Amount Administered During Treatment (mL)  --  0 mL   Hemodialysis Intake (ml)  --  400 ml   Hemodialysis Output (ml)  --  3400 ml   NET Removed (ml)  --  3000   Tolerated Treatment  --  Good

## 2023-03-24 NOTE — H&P
ms    Q-T Interval 442 ms    QTc Calculation (Bazett) 452 ms    P Axis 0 degrees    R Axis -12 degrees    T Axis 21 degrees   COVID-19 & Influenza Combo    Collection Time: 03/24/23  1:04 AM    Specimen: Nasopharyngeal Swab   Result Value Ref Range    SARS-CoV-2 RNA, RT PCR NOT DETECTED NOT DETECTED    INFLUENZA A NOT DETECTED NOT DETECTED    INFLUENZA B NOT DETECTED NOT DETECTED   Basic Metabolic Panel w/ Reflex to MG    Collection Time: 03/24/23  1:28 AM   Result Value Ref Range    Sodium 138 135 - 145 meq/L    Potassium reflex Magnesium 4.0 3.5 - 5.2 meq/L    Chloride 95 (L) 98 - 111 meq/L    CO2 27 23 - 33 meq/L    Glucose 96 70 - 108 mg/dL    BUN 39 (H) 7 - 22 mg/dL    Creatinine 6.6 (HH) 0.4 - 1.2 mg/dL    Calcium 10.1 8.5 - 10.5 mg/dL   CBC with Auto Differential    Collection Time: 03/24/23  1:28 AM   Result Value Ref Range    WBC 6.5 4.8 - 10.8 thou/mm3    RBC 2.94 (L) 4.70 - 6.10 mill/mm3    Hemoglobin 9.4 (L) 14.0 - 18.0 gm/dl    Hematocrit 27.7 (L) 42.0 - 52.0 %    MCV 94.2 (H) 80.0 - 94.0 fL    MCH 32.0 26.0 - 33.0 pg    MCHC 33.9 32.2 - 35.5 gm/dl    RDW-CV 15.0 (H) 11.5 - 14.5 %    RDW-SD 51.7 (H) 35.0 - 45.0 fL    Platelets 842 497 - 070 thou/mm3    MPV 8.9 (L) 9.4 - 12.4 fL    Seg Neutrophils 72.2 %    Lymphocytes 9.6 %    Monocytes 12.4 %    Eosinophils 4.2 %    Basophils 1.1 %    Immature Granulocytes 0.5 %    Segs Absolute 4.7 1.8 - 7.7 thou/mm3    Lymphocytes Absolute 0.6 (L) 1.0 - 4.8 thou/mm3    Monocytes Absolute 0.8 0.4 - 1.3 thou/mm3    Eosinophils Absolute 0.3 0.0 - 0.4 thou/mm3    Basophils Absolute 0.1 0.0 - 0.1 thou/mm3    Immature Grans (Abs) 0.03 0.00 - 0.07 thou/mm3    nRBC 0 /100 wbc   Troponin    Collection Time: 03/24/23  1:28 AM   Result Value Ref Range    Troponin T 0.042 (A) ng/ml   Brain Natriuretic Peptide    Collection Time: 03/24/23  1:28 AM   Result Value Ref Range    Pro-BNP 2717.0 (H) 0.0 - 124.0 pg/mL   Anion Gap    Collection Time: 03/24/23  1:28 AM   Result Value Ref Range    Anion Gap 16.0 8.0 - 16.0 meq/L   Osmolality    Collection Time: 03/24/23  1:28 AM   Result Value Ref Range    Osmolality Calc 284.9 275.0 - 300.0 mOsmol/kg   Glomerular Filtration Rate, Estimated    Collection Time: 03/24/23  1:28 AM   Result Value Ref Range    Est, Glom Filt Rate 9 (A) >60 ml/min/1.73m2   Blood gas, venous    Collection Time: 03/24/23  1:42 AM   Result Value Ref Range    PH MIXED 7.46 (H) 7.31 - 7.41    PCO2, MIXED VENOUS 41 41 - 51 mmhg    PO2, Mixed 51 (H) 25 - 40 mmhg    HCO3, Mixed 29 (H) 23 - 28 mmol/l    Base Exc, Mixed 4.9 (H) -2.0 - 3.0 mmol/l    O2 Sat, Mixed 87 %    FIO2, MIXED VENOUS 21     COLLECTED BY: [id number]     DEVICE Room Air    Troponin    Collection Time: 03/24/23  3:38 AM   Result Value Ref Range    Troponin T 0.037 (A) ng/ml         Vital Signs: T: 97.8F P: 62 RR: 14 B/P: 131/77: FiO2: 2L NC: O2 Sat:96%: I/O: No intake or output data in the 24 hours ending 03/24/23 0803      General:   no acute distress  HEENT:  normocephalic and atraumatic. No scleral icterus. PEARLA, mucous membranes moist  Neck: supple. Trachea midline. No JVD. Full ROM, no meningismus. Lungs: clear to auscultation. No retractions, no accessory muscle use. Cardiac: RRR, no murmur, 2+ pulses  Abdomen: soft. Nontender. Bowel sounds active  Extremities:  No clubbing, cyanosis x 4, no edema    Vasculature: capillary refill < 3 seconds. Skin:  warm and dry. no visible rashes  Psych:  Alert and oriented x3. Affect appropriate  Lymph:  No supraclavicular adenopathy. Neurologic:  CN II-XII grossly intact. No focal deficit. Data: (All radiographs, tracings, PFTs, and imaging are personally viewed and interpreted unless otherwise noted).    Recent admission data reviewed  EKG: rhythm: normal sinus rhythm, rate=63 bpm, mg=906 ms, cpz=494 ms, ks=586 ms, axis=0 degrees        Electronically signed by  Damita Severe, PA-C

## 2023-03-24 NOTE — CARE COORDINATION
Case Management Assessment  Initial Evaluation    Date/Time of Evaluation: 3/24/2023 1:19 PM  Assessment Completed by: Faizan Bowen RN    If patient is discharged prior to next notation, then this note serves as note for discharge by case management. Patient Name: Lucille Adams                   YOB: 1960  Diagnosis: Fluid overload [E87.70]  Acute respiratory failure with hypoxia ([facility] [address] [J96.01]  Acute on chronic congestive heart failure, unspecified heart failure type [facility]) [I50.9]                   Date / Time: 3/24/2023 12:49 AM  Location: [facility]24/[facility]     Patient Admission Status: Inpatient   Readmission Risk Low 0-14, Mod 15-19), High > 20: Readmission Risk Score: 28.2    Current PCP: Nirav Holloway, DO  PCP verified by CM? Yes    Chart Reviewed: Yes      History Provided by: Patient  Patient Orientation: Alert and Oriented    Patient Cognition: Alert    Hospitalization in the last 30 days (Readmission):  Yes    If yes, Readmission Assessment in CM Navigator will be completed. Advance Directives:      Code Status: Full Code   Patient's Primary Decision Maker is: Named in Scanned ACP Document    Primary Decision Maker: Robert Pereyra Spouse - [phone]    Discharge Planning:    Patient lives with: Spouse/Significant Other Type of Home: House  Primary Care Giver: Self  Patient Support Systems include: Spouse/Significant Other   Current Financial resources: Medicare  Current community resources:    Current services prior to admission: C-pap, Oxygen Therapy            Current DME: Oxygen Therapy (Comment)            Type of Home Care services:  None    ADLS  Prior functional level: Independent in ADLs/IADLs  Current functional level: Independent in ADLs/IADLs    Family can provide assistance at DC: Yes  Would you like Case Management to discuss the discharge plan with any other family members/significant others, and if so, who?  No  Plans to Return to Present Housing: Yes  Other Identified Issues/Barriers to RETURNING to current housing: None  Potential Assistance needed at discharge: N/A            Potential DME:    Patient expects to discharge to: [facility] for transportation at discharge: Self    Financial    Payor: [address] [facility] / Plan: Eduar Velazco / Product Type: *No Product type* /     Does insurance require precert for SNF: Yes    Potential assistance Purchasing Medications: No  Meds-to-Beds request: Yes      ANNABELLA Ferrell, [place] - [address] [facility] [address]  Phone: [phone] Fax: [phone]    [address] [facility] Mail Delivery - [place] [facility] [address] [facility] [address]  Phone: [phone] Fax: [phone]      Notes:    Factors facilitating achievement of predicted outcomes: Family support, Motivated, and Cooperative    Barriers to discharge: Medical complications and Medication managment    Additional Case Management Notes: Pt admitted through ER with shortness of breath and chills. On tele, heparin SQ, Albuterol PRN, BNP 2717    Procedure: 3/24: CTA chest pending    The Plan for Transition of Care is related to the following treatment goals of Fluid overload [E87.70]  Acute respiratory failure with hypoxia (HCC) [J96.01]  Acute on chronic congestive heart failure, unspecified heart failure type ([facility] [address] [I50.9]    Patient Goals/Plan/Treatment Preferences: SPoke with pt. He lives home with wife. Able to drive. Independent in ADL's. Has cane and walker. He does home hemodialysis M-Fri. HD equipment supplied by [facility]. Has home bipap and home O2 (2L ATC and 4L when sleeping) through [facility]. Declines needs for home health. Verified PCP and insurance. Transportation/Food Security/Housekeeping Addressed: No issues identified.      Koki Alfredo RN  Case Management Department

## 2023-03-24 NOTE — ED NOTES
Dr. Shantal Ulloa at bedside discussing plan of care with pt and SO. Call light in reach.      Gelacio Deleon RN  03/24/23 4611

## 2023-03-24 NOTE — RT PROTOCOL NOTE
RT Inhaler-Nebulizer Bronchodilator Protocol Note    There is a bronchodilator order in the chart from a provider indicating to follow the RT Bronchodilator Protocol and there is an Initiate RT Inhaler-Nebulizer Bronchodilator Protocol order as well (see protocol at bottom of note). CXR Findings:  No results found. The findings from the last RT Protocol Assessment were as follows:   History Pulmonary Disease: None or smoker <15 pack years  Respiratory Pattern: Regular pattern and RR 12-20 bpm  Breath Sounds: Slightly diminished and/or crackles  Cough: Strong, spontaneous, non-productive  Indication for Bronchodilator Therapy: On home bronchodilators  Bronchodilator Assessment Score: 2    Aerosolized bronchodilator medication orders have been revised according to the RT Inhaler-Nebulizer Bronchodilator Protocol below. Respiratory Therapist to perform RT Therapy Protocol Assessment initially then follow the protocol. Repeat RT Therapy Protocol Assessment PRN for score 0-3 or on second treatment, BID, and PRN for scores above 3. No Indications - adjust the frequency to every 6 hours PRN wheezing or bronchospasm, if no treatments needed after 48 hours then discontinue using Per Protocol order mode. If indication present, adjust the RT bronchodilator orders based on the Bronchodilator Assessment Score as indicated below. Use Inhaler orders unless patient has one or more of the following: on home nebulizer, not able to hold breath for 10 seconds, is not alert and oriented, cannot activate and use MDI correctly, or respiratory rate 25 breaths per minute or more, then use the equivalent nebulizer order(s) with same Frequency and PRN reasons based on the score. If a patient is on this medication at home then do not decrease Frequency below that used at home.     0-3 - enter or revise RT bronchodilator order(s) to equivalent RT Bronchodilator order with Frequency of every 4 hours PRN for wheezing or

## 2023-03-24 NOTE — ED NOTES
Pt requesting pain medication for chronic back pain. Provider notified. Updated on plan of care. Call light in reach.      Toy Friedman RN  03/24/23 6588

## 2023-03-24 NOTE — PROGRESS NOTES
Pt admitted to  8AB24 via in a wheelchair from ED. Complaints: Shortness of breath. IV none infusing into the forearm right, condition patent and no redness at a rate of 0 mls/ hour. IV site free of s/s of infection or infiltration. Vital signs obtained. Assessment and data collection initiated. Two nurse skin assessment performed by ANN RN and KATY RN. Oriented to room. Policies and procedures for 8AB explained. All questions answered with no further questions at this time. Fall prevention and safety brochure discussed with patient. Bed alarm on. Call light in reach. Oriented to room. Gris Mcdermott, RN, RN 3/24/2023 6:02 AM     Explained patients right to have family, representative or physician notified of their admission. Patient has Declined for physician to be notified. Patient has Declined for family/representative to be notified. Patient would like family notified once per shift?  No

## 2023-03-24 NOTE — ED PROVIDER NOTES
[facility] [address] [address] EMERGENCY DEPT      EMERGENCY MEDICINE     Pt Name: Dewey Ramon  MRN: [id number]  Mauriciogfjustin 1960  Date of evaluation: 3/24/2023  Provider: Nito Rogers MD  Supervising Physician: Dr Ramón Lizarraga   Patient presents with    Shortness of Breath    Chills     HISTORY OF PRESENT ILLNESS   Dewey Ramon is a 63 y.o. male with past medical history of CHF, diabetes mellitus, end-stage renal disease on dialysis who presents to the emergency department for increasing shortness of breath, dyspnea on exertion over the last week 2 days. Denies any chest pain. Does mention some nausea however since yesterday or today. Denies any diarrhea. He has a dialysis catheter in his right upper chest wall however had a fistula placed at the end of December. Mentions having a runny nose and some congestion. PASTMEDICAL HISTORY     Past Medical History:   Diagnosis Date    Arthritis     Back problem     back pain-sees [facility] pain mgmt    Bladder disease     Dr. Jadon Hood    CHF with unknown LVEF ([facility] [address] 05/24/2021    Dr. George/CHF clinic    Chronic kidney disease     Constipation     Diabetes mellitus ([facility] [address]     Dr. Leoncio Deutsch    Hypertension     Hypertensive emergency 04/11/2019    Hypertensive urgency 04/13/2019    Sleep apnea     has bipap-sees AZRA Olmedo CNP    Thyroid disease        Patient Active Problem List   Diagnosis Code    Allergy to bee sting Z91.030    Obesity, Class III, BMI 40-49.9 (morbid obesity) (HCC) E66.01    Weight gain R63.5    Primary hypertension I10    Rheumatoid arteritis (HCC) M05.20    Hypothyroidism E03.9    Gastroenteritis K52.9    Obstructive sleep apnea G47.33    Small intestinal bacterial overgrowth K63.89    Acute diarrhea R19.7    Generalized abdominal pain R10.84    Nausea and vomiting R11.2    Hepatomegaly R16.0    Ileus (HCC) K56.7    Hypokalemia E87.6    S/P partial resection of colon Z90.49    S/P laparotomy Z98.890    Acute renal Admitted 03/24/2023 03:35:39 AM      OUTPATIENT FOLLOW UP THE PATIENT:  No follow-up provider specified.     MD Yeny Saleem MD  Resident  03/24/23 0457

## 2023-03-24 NOTE — ED NOTES
Pt in bed sleeping and O2 sats noted to be 86% on RA. Pt does wear O2 PRN 2-4L at home. Pt placed on 2L NC at this time and sats now 96% Provider notified.      Danish Abad RN  03/24/23 2291

## 2023-03-24 NOTE — ED TRIAGE NOTES
Pt arrives to ED for c/o SOB and chills. Pt states he had dialysis this morning and felt fine after. Pt states a few hours ago, he began feeling SOB and having hot/cold flashes.

## 2023-03-24 NOTE — ED NOTES
ED to inpatient nurses report    Chief Complaint   Patient presents with    Shortness of Breath    Chills      Present to ED from Home  LOC: alert and orientated to name, place, date  Vital signs   Vitals:    03/24/23 0200 03/24/23 0243 03/24/23 0245 03/24/23 0355   BP: 124/72  131/77 117/74   Pulse: 63  62 61   Resp: 14  14 16   Temp:       TempSrc:       SpO2: 96% (!) 86% 96% 100%   Weight:       Height:          Oxygen Baseline Room Air, O2 2-4L PRN at home    Current needs required 2L NC Bipap/Cpap No  LDAs:   Peripheral IV 03/24/23 Right Forearm (Active)   Site Assessment Clean, dry & intact 03/24/23 0354   Line Status Normal saline locked 03/24/23 0354   Phlebitis Assessment No symptoms 03/24/23 0354   Infiltration Assessment 0 03/24/23 0354   Dressing Status Clean, dry & intact 03/24/23 0354   Dressing Type Transparent 03/24/23 0130   Dressing Intervention New 03/24/23 0130     Mobility: Requires assistance * 1  Pending ED orders: None  Present condition: Stable    C-SSRS Risk of Suicide: No Risk  Swallow Screening    Preferred Language: Shelley Rosa     Electronically signed by Enio Echavarria RN on 3/24/2023 at 4:06 AM     Enio Echavarria RN  03/24/23 3603

## 2023-03-24 NOTE — ED NOTES
Pt transported to 8B24 by cart in stable condition. Called 8B and informed Olu Francis that the patient was on their way to the unit.       Nori Heller, AVINASHN  85/05/22 5998

## 2023-03-25 NOTE — PLAN OF CARE
62M morbidly obese preseting w/ AKINS; denies CP. EKG negative for any new/dynamic ischemic changes. Pt adherent w/ diet and IHD. Less concern for volume-overload at this time; will trend trop w/ plan for cardiac stress test tomorrow unless any concerns for ACS ; also CTA chest ordered to assess for PE given limited mobility and body habitus. Any further plan pf care per clinical status and test findings.

## 2023-03-25 NOTE — PROGRESS NOTES
Physician Progress Note      PATIENT:               Ezequiel Flaherty  CSN #:                  [id number]  :                       1960  ADMIT DATE:       3/24/2023 12:49 AM  DISCH DATE:        3/24/2023 10:30 PM  RESPONDING  PROVIDER #:        Stefanie Reddy MD          QUERY TEXT:    Pt admitted with fluid overload. Pt noted to have SOB, pulse ox 84% . If   possible, please document in the progress notes and discharge summary if you   are evaluating and/or treating any of the following: The medical record reflects the following:  Risk Factors: ESRD  Clinical Indicators: wears 2-4LNC at home, pulse ox 84%, no labored breathing  Treatment: oxygen as needed    Thank You! Hang Avalos RN, CRCR  RN Clinical Documentation Integrity  (E) [phone] (L) [phone]  Options provided:  -- Chronic respiratory failure with hypoxia  -- Chronic respiratory failure with hypercapnia  -- Chronic respiratory failure with hypoxia and hypercapnia  -- Other - I will add my own diagnosis  -- Disagree - Not applicable / Not valid  -- Disagree - Clinically unable to determine / Unknown  -- Refer to Clinical Documentation Reviewer    PROVIDER RESPONSE TEXT:    Provider disagreed with this query.     Query created by: Sanjuana Ulloa on 3/24/2023 9:45 AM      Electronically signed by:  Stefanie Reddy MD 3/25/2023 7:21 AM

## 2023-03-27 NOTE — DISCHARGE SUMMARY
Apparnetly, later at night on 3/24/203 pt left hospital AMA. I didn't see any documentation on Epic. But py reportedly left hospital AMA in stable medical conditions.

## 2023-03-29 ENCOUNTER — OFFICE VISIT (OUTPATIENT)
Dept: PHYSICAL MEDICINE AND REHAB | Age: 63
End: 2023-03-29
Payer: MEDICARE

## 2023-03-29 VITALS
BODY MASS INDEX: 38.57 KG/M2 | WEIGHT: 291 LBS | SYSTOLIC BLOOD PRESSURE: 124 MMHG | DIASTOLIC BLOOD PRESSURE: 66 MMHG | HEIGHT: 73 IN

## 2023-03-29 DIAGNOSIS — I50.32 CHF (CONGESTIVE HEART FAILURE), NYHA CLASS II, CHRONIC, DIASTOLIC (HCC): ICD-10-CM

## 2023-03-29 DIAGNOSIS — G89.29 CHRONIC PAIN OF BOTH KNEES: ICD-10-CM

## 2023-03-29 DIAGNOSIS — M25.561 CHRONIC PAIN OF BOTH KNEES: ICD-10-CM

## 2023-03-29 DIAGNOSIS — G62.9 NEUROPATHY: ICD-10-CM

## 2023-03-29 DIAGNOSIS — M48.062 SPINAL STENOSIS OF LUMBAR REGION WITH NEUROGENIC CLAUDICATION: ICD-10-CM

## 2023-03-29 DIAGNOSIS — M47.816 SPONDYLOSIS OF LUMBAR REGION WITHOUT MYELOPATHY OR RADICULOPATHY: Primary | ICD-10-CM

## 2023-03-29 DIAGNOSIS — M17.0 PRIMARY OSTEOARTHRITIS OF BOTH KNEES: ICD-10-CM

## 2023-03-29 DIAGNOSIS — F11.90 CHRONIC, CONTINUOUS USE OF OPIOIDS: ICD-10-CM

## 2023-03-29 DIAGNOSIS — G89.4 CHRONIC PAIN SYNDROME: ICD-10-CM

## 2023-03-29 DIAGNOSIS — M54.17 LUMBOSACRAL RADICULITIS: ICD-10-CM

## 2023-03-29 DIAGNOSIS — N18.4 CHRONIC KIDNEY DISEASE, STAGE IV (SEVERE) (HCC): ICD-10-CM

## 2023-03-29 DIAGNOSIS — M25.562 CHRONIC PAIN OF BOTH KNEES: ICD-10-CM

## 2023-03-29 DIAGNOSIS — M47.816 LUMBAR FACET ARTHROPATHY: ICD-10-CM

## 2023-03-29 PROCEDURE — 3074F SYST BP LT 130 MM HG: CPT | Performed by: NURSE PRACTITIONER

## 2023-03-29 PROCEDURE — 99214 OFFICE O/P EST MOD 30 MIN: CPT | Performed by: NURSE PRACTITIONER

## 2023-03-29 PROCEDURE — 4004F PT TOBACCO SCREEN RCVD TLK: CPT | Performed by: NURSE PRACTITIONER

## 2023-03-29 PROCEDURE — 1111F DSCHRG MED/CURRENT MED MERGE: CPT | Performed by: NURSE PRACTITIONER

## 2023-03-29 PROCEDURE — G8482 FLU IMMUNIZE ORDER/ADMIN: HCPCS | Performed by: NURSE PRACTITIONER

## 2023-03-29 PROCEDURE — 3078F DIAST BP <80 MM HG: CPT | Performed by: NURSE PRACTITIONER

## 2023-03-29 PROCEDURE — 3017F COLORECTAL CA SCREEN DOC REV: CPT | Performed by: NURSE PRACTITIONER

## 2023-03-29 PROCEDURE — G8427 DOCREV CUR MEDS BY ELIG CLIN: HCPCS | Performed by: NURSE PRACTITIONER

## 2023-03-29 PROCEDURE — G8417 CALC BMI ABV UP PARAM F/U: HCPCS | Performed by: NURSE PRACTITIONER

## 2023-03-29 RX ORDER — GABAPENTIN 300 MG/1
300 CAPSULE ORAL NIGHTLY
Qty: 30 CAPSULE | Refills: 0 | Status: SHIPPED | OUTPATIENT
Start: 2023-03-29 | End: 2023-04-28

## 2023-03-29 RX ORDER — OXYCODONE AND ACETAMINOPHEN 10; 325 MG/1; MG/1
1 TABLET ORAL EVERY 8 HOURS PRN
Qty: 90 TABLET | Refills: 0 | Status: SHIPPED | OUTPATIENT
Start: 2023-03-29 | End: 2023-04-28

## 2023-03-29 ASSESSMENT — ENCOUNTER SYMPTOMS
WHEEZING: 0
COUGH: 0
ABDOMINAL PAIN: 1
SHORTNESS OF BREATH: 0
STRIDOR: 0
DIARRHEA: 1
BACK PAIN: 1
CHOKING: 0
CHEST TIGHTNESS: 0

## 2023-03-29 NOTE — PROGRESS NOTES
cerumen. Nose: Nose normal. No congestion or rhinorrhea. Mouth/Throat:      Mouth: Mucous membranes are moist.      Pharynx: Oropharynx is clear. Eyes:      Extraocular Movements: Extraocular movements intact. Conjunctiva/sclera: Conjunctivae normal.      Pupils: Pupils are equal, round, and reactive to light. Cardiovascular:      Rate and Rhythm: Normal rate and regular rhythm. Pulses: Normal pulses. Heart sounds: Normal heart sounds. Pulmonary:      Effort: Pulmonary effort is normal. No respiratory distress. Breath sounds: Normal breath sounds. Comments: diminished   Abdominal:      General: Abdomen is flat. Bowel sounds are normal. There is no distension. Palpations: Abdomen is soft. There is no mass. Tenderness: There is no abdominal tenderness. Hernia: No hernia is present. Genitourinary:     Comments: Oliguria, on hemodialysis  Musculoskeletal:         General: Tenderness present. Right shoulder: Tenderness present. Decreased range of motion. Right wrist: Tenderness present. Decreased range of motion. Left wrist: Tenderness present. Decreased range of motion. Cervical back: Neck supple. Tenderness and bony tenderness present. Muscular tenderness present. Thoracic back: Bony tenderness present. Decreased range of motion. Lumbar back: Tenderness and bony tenderness present. Decreased range of motion. Positive right straight leg raise test and positive left straight leg raise test.        Back:       Right hip: Bony tenderness present. Decreased range of motion. Decreased strength. Left hip: Bony tenderness present. Decreased range of motion. Decreased strength. Right knee: Swelling and bony tenderness present. Decreased range of motion. Tenderness present. Left knee: Bony tenderness present. Decreased range of motion. Tenderness present. Right lower leg: Edema present. Left lower leg: Edema present.

## 2023-03-30 ENCOUNTER — TELEPHONE (OUTPATIENT)
Dept: CARDIOLOGY CLINIC | Age: 63
End: 2023-03-30

## 2023-04-06 DIAGNOSIS — R97.20 RISING PSA LEVEL: Primary | ICD-10-CM

## 2023-04-07 PROBLEM — R77.8 ELEVATED TROPONIN: Status: RESOLVED | Noted: 2023-03-08 | Resolved: 2023-04-07

## 2023-04-07 PROBLEM — R79.89 ELEVATED TROPONIN: Status: RESOLVED | Noted: 2023-03-08 | Resolved: 2023-04-07

## 2023-04-27 DIAGNOSIS — G89.4 CHRONIC PAIN SYNDROME: ICD-10-CM

## 2023-04-27 RX ORDER — GABAPENTIN 300 MG/1
300 CAPSULE ORAL NIGHTLY
Qty: 30 CAPSULE | Refills: 0 | Status: SHIPPED | OUTPATIENT
Start: 2023-04-28 | End: 2023-05-28

## 2023-04-27 RX ORDER — OXYCODONE AND ACETAMINOPHEN 10; 325 MG/1; MG/1
1 TABLET ORAL EVERY 8 HOURS PRN
Qty: 90 TABLET | Refills: 0 | Status: SHIPPED | OUTPATIENT
Start: 2023-04-28 | End: 2023-05-28

## 2023-04-27 NOTE — TELEPHONE ENCOUNTER
OARRS reviewed. UDS: + for  Gabapentin, Oxycodone, Doxepin. Last seen: 3/29/2023.  Follow-up: 6/7/2023

## 2023-05-11 ENCOUNTER — OFFICE VISIT (OUTPATIENT)
Dept: UROLOGY | Age: 63
End: 2023-05-11
Payer: MEDICARE

## 2023-05-11 VITALS — WEIGHT: 301 LBS | BODY MASS INDEX: 39.89 KG/M2 | HEIGHT: 73 IN

## 2023-05-11 DIAGNOSIS — N40.1 BENIGN PROSTATIC HYPERPLASIA WITH URINARY FREQUENCY: ICD-10-CM

## 2023-05-11 DIAGNOSIS — N47.1 PHIMOSIS: Primary | ICD-10-CM

## 2023-05-11 DIAGNOSIS — R31.0 GROSS HEMATURIA: ICD-10-CM

## 2023-05-11 DIAGNOSIS — R35.0 BENIGN PROSTATIC HYPERPLASIA WITH URINARY FREQUENCY: ICD-10-CM

## 2023-05-11 PROCEDURE — 3017F COLORECTAL CA SCREEN DOC REV: CPT | Performed by: UROLOGY

## 2023-05-11 PROCEDURE — G8417 CALC BMI ABV UP PARAM F/U: HCPCS | Performed by: UROLOGY

## 2023-05-11 PROCEDURE — 4004F PT TOBACCO SCREEN RCVD TLK: CPT | Performed by: UROLOGY

## 2023-05-11 PROCEDURE — G8427 DOCREV CUR MEDS BY ELIG CLIN: HCPCS | Performed by: UROLOGY

## 2023-05-11 PROCEDURE — 99214 OFFICE O/P EST MOD 30 MIN: CPT | Performed by: UROLOGY

## 2023-05-11 NOTE — PROGRESS NOTES
Problems Brother     Diabetes Paternal Grandmother     High Blood Pressure Neg Hx      Outpatient Medications Marked as Taking for the 5/11/23 encounter (Office Visit) with Aramis Campa MD   Medication Sig Dispense Refill    gabapentin (NEURONTIN) 300 MG capsule Take 1 capsule by mouth nightly for 30 days. 30 capsule 0    oxyCODONE-acetaminophen (PERCOCET)  MG per tablet Take 1 tablet by mouth every 8 hours as needed for Pain for up to 30 days.  Intended supply: 30 days 90 tablet 0    Psyllium 48.57 % POWD Psyllium Seed (Sugar) (Metamucil) 48.57 % powder Active 48.57 % PO Four Times Daily 14 May 7th, 2013 12:42am      primidone (MYSOLINE) 50 MG tablet Take 0.5 tablets by mouth daily 15 tablet 2    sevelamer (RENVELA) 800 MG tablet Take 1 tablet by mouth 3 times daily (with meals)      doxepin (SINEQUAN) 25 MG capsule Take 1 capsule by mouth nightly 30 capsule 3    Methoxy PEG-Epoetin Beta (MIRCERA IJ) Inject 200 mcg into the skin every 14 days Last dose last Wednesday      mometasone-formoterol (DULERA) 100-5 MCG/ACT inhaler Inhale 2 puffs into the lungs in the morning and at bedtime      montelukast (SINGULAIR) 10 MG tablet Take 1 tablet by mouth nightly      naloxone 4 MG/0.1ML LIQD nasal spray 1 spray by Nasal route as needed      carvedilol (COREG) 25 MG tablet Take 1 tablet by mouth 2 times daily As need for High BP      VITAMIN D PO Take 1 tablet by mouth daily Pt states doesn't state it on bottle how much      rOPINIRole (REQUIP) 0.5 MG tablet take 1 tablet by mouth IN THE AFTERNOON AND 2 TABLETS AT NIGHT. (Patient taking differently: Take 1 tablet by mouth nightly take 1 tablet by mouth IN THE AFTERNOON AND 2 TABLETS AT NIGHT.) 90 tablet 5    CPAP Machine MISC by Does not apply route Please change bipap to 18/14 cm H20. 1 each 0    levothyroxine (SYNTHROID) 175 MCG tablet Take 1 tablet by mouth Daily 30 tablet 3    ipratropium-albuterol (DUONEB) 0.5-2.5 (3) MG/3ML SOLN nebulizer solution Inhale 3 mLs into

## 2023-05-24 DIAGNOSIS — G89.4 CHRONIC PAIN SYNDROME: ICD-10-CM

## 2023-05-25 RX ORDER — GABAPENTIN 300 MG/1
300 CAPSULE ORAL NIGHTLY
Qty: 30 CAPSULE | Refills: 0 | Status: SHIPPED | OUTPATIENT
Start: 2023-05-30 | End: 2023-06-29

## 2023-05-25 RX ORDER — OXYCODONE AND ACETAMINOPHEN 10; 325 MG/1; MG/1
1 TABLET ORAL EVERY 8 HOURS PRN
Qty: 90 TABLET | Refills: 0 | Status: SHIPPED | OUTPATIENT
Start: 2023-05-30 | End: 2023-06-29

## 2023-05-25 NOTE — TELEPHONE ENCOUNTER
OARRS reviewed. UDS: + for  gabapentin and oxycodone. Inconsistent for doxepin as of 3/29/2023. Last seen: 3/29/2023.  Follow-up:   Future Appointments   Date Time Provider Ismael Samina   6/7/2023 11:00 AM SAHIL Luz - CNP N [facility] Pain [facility] [place]   6/30/2023  3:15 PM Jo Me, 62 [facility] Neurology -   8/2/2023  3:00 PM Mata Pendleton PA-C N [facility] Heart [facility] [place]   8/11/2023 12:00 PM SAHIL Moran - CNP [facility] [address]   11/16/2023 12:45 PM Ale Don, [facility] Located within [facility]

## 2023-06-07 ENCOUNTER — OFFICE VISIT (OUTPATIENT)
Dept: PHYSICAL MEDICINE AND REHAB | Age: 63
End: 2023-06-07
Payer: MEDICARE

## 2023-06-07 VITALS
HEIGHT: 73 IN | DIASTOLIC BLOOD PRESSURE: 64 MMHG | BODY MASS INDEX: 39.89 KG/M2 | SYSTOLIC BLOOD PRESSURE: 118 MMHG | WEIGHT: 301 LBS

## 2023-06-07 DIAGNOSIS — M25.511 CHRONIC RIGHT SHOULDER PAIN: ICD-10-CM

## 2023-06-07 DIAGNOSIS — M25.562 CHRONIC PAIN OF BOTH KNEES: ICD-10-CM

## 2023-06-07 DIAGNOSIS — M48.062 SPINAL STENOSIS OF LUMBAR REGION WITH NEUROGENIC CLAUDICATION: ICD-10-CM

## 2023-06-07 DIAGNOSIS — M19.011 PRIMARY OSTEOARTHRITIS, RIGHT SHOULDER: ICD-10-CM

## 2023-06-07 DIAGNOSIS — M47.816 LUMBAR FACET ARTHROPATHY: ICD-10-CM

## 2023-06-07 DIAGNOSIS — I50.32 CHF (CONGESTIVE HEART FAILURE), NYHA CLASS II, CHRONIC, DIASTOLIC (HCC): ICD-10-CM

## 2023-06-07 DIAGNOSIS — G89.29 CHRONIC PAIN OF BOTH KNEES: ICD-10-CM

## 2023-06-07 DIAGNOSIS — M47.816 SPONDYLOSIS OF LUMBAR REGION WITHOUT MYELOPATHY OR RADICULOPATHY: Primary | ICD-10-CM

## 2023-06-07 DIAGNOSIS — M17.0 PRIMARY OSTEOARTHRITIS OF BOTH KNEES: ICD-10-CM

## 2023-06-07 DIAGNOSIS — M25.561 CHRONIC PAIN OF BOTH KNEES: ICD-10-CM

## 2023-06-07 DIAGNOSIS — G89.29 CHRONIC RIGHT SHOULDER PAIN: ICD-10-CM

## 2023-06-07 DIAGNOSIS — G62.9 NEUROPATHY: ICD-10-CM

## 2023-06-07 DIAGNOSIS — M54.17 LUMBOSACRAL RADICULITIS: ICD-10-CM

## 2023-06-07 DIAGNOSIS — N18.4 CHRONIC KIDNEY DISEASE, STAGE IV (SEVERE) (HCC): ICD-10-CM

## 2023-06-07 PROCEDURE — 4004F PT TOBACCO SCREEN RCVD TLK: CPT | Performed by: NURSE PRACTITIONER

## 2023-06-07 PROCEDURE — G8417 CALC BMI ABV UP PARAM F/U: HCPCS | Performed by: NURSE PRACTITIONER

## 2023-06-07 PROCEDURE — 3017F COLORECTAL CA SCREEN DOC REV: CPT | Performed by: NURSE PRACTITIONER

## 2023-06-07 PROCEDURE — 99214 OFFICE O/P EST MOD 30 MIN: CPT | Performed by: NURSE PRACTITIONER

## 2023-06-07 PROCEDURE — 3078F DIAST BP <80 MM HG: CPT | Performed by: NURSE PRACTITIONER

## 2023-06-07 PROCEDURE — 3074F SYST BP LT 130 MM HG: CPT | Performed by: NURSE PRACTITIONER

## 2023-06-07 PROCEDURE — G8427 DOCREV CUR MEDS BY ELIG CLIN: HCPCS | Performed by: NURSE PRACTITIONER

## 2023-06-07 ASSESSMENT — ENCOUNTER SYMPTOMS
SHORTNESS OF BREATH: 0
STRIDOR: 0
BACK PAIN: 1
DIARRHEA: 1
COUGH: 0
CONSTIPATION: 1
CHOKING: 0
WHEEZING: 0
ABDOMINAL PAIN: 1
CHEST TIGHTNESS: 0

## 2023-06-07 NOTE — PROGRESS NOTES
atraumatic. Right Ear: External ear normal. There is no impacted cerumen. Left Ear: External ear normal. There is no impacted cerumen. Nose: Nose normal. No congestion or rhinorrhea. Mouth/Throat:      Mouth: Mucous membranes are moist.      Pharynx: Oropharynx is clear. Eyes:      Extraocular Movements: Extraocular movements intact. Conjunctiva/sclera: Conjunctivae normal.      Pupils: Pupils are equal, round, and reactive to light. Cardiovascular:      Rate and Rhythm: Normal rate and regular rhythm. Pulses: Normal pulses. Heart sounds: Normal heart sounds. Pulmonary:      Effort: Pulmonary effort is normal. No respiratory distress. Breath sounds: Normal breath sounds. Comments: diminished   Abdominal:      General: Abdomen is flat. Bowel sounds are normal. There is no distension. Palpations: Abdomen is soft. There is no mass. Tenderness: There is no abdominal tenderness. Hernia: No hernia is present. Genitourinary:     Comments: Oliguria, on hemodialysis  Musculoskeletal:         General: Tenderness present. Right shoulder: Tenderness and bony tenderness present. No swelling. Decreased range of motion. Decreased strength. Right wrist: Tenderness present. Decreased range of motion. Left wrist: Tenderness present. Decreased range of motion. Cervical back: Neck supple. Tenderness and bony tenderness present. Muscular tenderness present. Thoracic back: Bony tenderness present. Decreased range of motion. Lumbar back: Tenderness and bony tenderness present. Decreased range of motion. Positive right straight leg raise test and positive left straight leg raise test.        Back:       Right hip: Bony tenderness present. Decreased range of motion. Decreased strength. Left hip: Bony tenderness present. Decreased range of motion. Decreased strength. Right knee: Swelling and bony tenderness present.  Decreased range of

## 2023-06-21 ENCOUNTER — TELEPHONE (OUTPATIENT)
Dept: NEUROLOGY | Age: 63
End: 2023-06-21

## 2023-06-21 RX ORDER — PRIMIDONE 50 MG/1
50 TABLET ORAL 2 TIMES DAILY
Qty: 60 TABLET | Refills: 3 | Status: SHIPPED | OUTPATIENT
Start: 2023-06-21

## 2023-06-21 RX ORDER — PRIMIDONE 50 MG/1
TABLET ORAL
Qty: 15 TABLET | Refills: 2 | OUTPATIENT
Start: 2023-06-21

## 2023-06-21 NOTE — TELEPHONE ENCOUNTER
Spoke with patient who stated he is taking Primidone 50 mg daily. He feels there is room for improvement as he continues to have tremors. Please advise. Thank you.

## 2023-06-29 DIAGNOSIS — G89.4 CHRONIC PAIN SYNDROME: ICD-10-CM

## 2023-06-29 RX ORDER — OXYCODONE AND ACETAMINOPHEN 10; 325 MG/1; MG/1
1 TABLET ORAL EVERY 8 HOURS PRN
Qty: 90 TABLET | Refills: 0 | Status: SHIPPED | OUTPATIENT
Start: 2023-06-29 | End: 2023-07-29

## 2023-06-29 RX ORDER — GABAPENTIN 300 MG/1
300 CAPSULE ORAL NIGHTLY
Qty: 30 CAPSULE | Refills: 0 | Status: SHIPPED | OUTPATIENT
Start: 2023-06-29 | End: 2023-07-29

## 2023-07-03 RX ORDER — SODIUM CHLORIDE 9 MG/ML
INJECTION, SOLUTION INTRAVENOUS CONTINUOUS
Status: DISCONTINUED | OUTPATIENT
Start: 2023-07-03 | End: 2023-07-07 | Stop reason: HOSPADM

## 2023-07-03 RX ORDER — SODIUM CHLORIDE 0.9 % (FLUSH) 0.9 %
5-40 SYRINGE (ML) INJECTION PRN
Status: DISCONTINUED | OUTPATIENT
Start: 2023-07-03 | End: 2023-07-07 | Stop reason: HOSPADM

## 2023-07-03 RX ORDER — SODIUM CHLORIDE 9 MG/ML
25 INJECTION, SOLUTION INTRAVENOUS PRN
Status: DISCONTINUED | OUTPATIENT
Start: 2023-07-03 | End: 2023-07-07 | Stop reason: HOSPADM

## 2023-07-03 RX ORDER — SODIUM CHLORIDE 0.9 % (FLUSH) 0.9 %
5-40 SYRINGE (ML) INJECTION EVERY 12 HOURS SCHEDULED
Status: DISCONTINUED | OUTPATIENT
Start: 2023-07-03 | End: 2023-07-07 | Stop reason: HOSPADM

## 2023-07-07 ENCOUNTER — HOSPITAL ENCOUNTER (OUTPATIENT)
Age: 63
Setting detail: OUTPATIENT SURGERY
Discharge: HOME | End: 2023-07-07
Attending: INTERNAL MEDICINE | Admitting: INTERNAL MEDICINE
Payer: MEDICARE

## 2023-07-07 ENCOUNTER — ANESTHESIA EVENT (OUTPATIENT)
Dept: ENDOSCOPY | Age: 63
End: 2023-07-07
Payer: MEDICARE

## 2023-07-07 ENCOUNTER — ANESTHESIA (OUTPATIENT)
Dept: ENDOSCOPY | Age: 63
End: 2023-07-07
Payer: MEDICARE

## 2023-07-07 VITALS
TEMPERATURE: 97.2 F | RESPIRATION RATE: 16 BRPM | HEART RATE: 64 BPM | DIASTOLIC BLOOD PRESSURE: 63 MMHG | OXYGEN SATURATION: 93 % | SYSTOLIC BLOOD PRESSURE: 141 MMHG | WEIGHT: 285 LBS | BODY MASS INDEX: 37.77 KG/M2 | HEIGHT: 73 IN

## 2023-07-07 PROCEDURE — 2720000010 HC SURG SUPPLY STERILE: Performed by: INTERNAL MEDICINE

## 2023-07-07 PROCEDURE — 7100000010 HC PHASE II RECOVERY - FIRST 15 MIN: Performed by: INTERNAL MEDICINE

## 2023-07-07 PROCEDURE — 3609027000 HC COLONOSCOPY: Performed by: INTERNAL MEDICINE

## 2023-07-07 PROCEDURE — 6360000002 HC RX W HCPCS: Performed by: NURSE ANESTHETIST, CERTIFIED REGISTERED

## 2023-07-07 PROCEDURE — 3700000000 HC ANESTHESIA ATTENDED CARE: Performed by: INTERNAL MEDICINE

## 2023-07-07 PROCEDURE — 3609017100 HC EGD: Performed by: INTERNAL MEDICINE

## 2023-07-07 PROCEDURE — 3700000001 HC ADD 15 MINUTES (ANESTHESIA): Performed by: INTERNAL MEDICINE

## 2023-07-07 PROCEDURE — 2580000003 HC RX 258: Performed by: INTERNAL MEDICINE

## 2023-07-07 PROCEDURE — 2500000003 HC RX 250 WO HCPCS: Performed by: NURSE ANESTHETIST, CERTIFIED REGISTERED

## 2023-07-07 PROCEDURE — 7100000011 HC PHASE II RECOVERY - ADDTL 15 MIN: Performed by: INTERNAL MEDICINE

## 2023-07-07 RX ORDER — PROPOFOL 10 MG/ML
INJECTION, EMULSION INTRAVENOUS PRN
Status: DISCONTINUED | OUTPATIENT
Start: 2023-07-07 | End: 2023-07-07 | Stop reason: SDUPTHER

## 2023-07-07 RX ORDER — LIDOCAINE HYDROCHLORIDE 20 MG/ML
INJECTION, SOLUTION EPIDURAL; INFILTRATION; INTRACAUDAL; PERINEURAL PRN
Status: DISCONTINUED | OUTPATIENT
Start: 2023-07-07 | End: 2023-07-07 | Stop reason: SDUPTHER

## 2023-07-07 RX ADMIN — PROPOFOL 250 MG: 10 INJECTION, EMULSION INTRAVENOUS at 10:55

## 2023-07-07 RX ADMIN — SODIUM CHLORIDE: 9 INJECTION, SOLUTION INTRAVENOUS at 10:54

## 2023-07-07 RX ADMIN — LIDOCAINE HYDROCHLORIDE 100 MG: 20 INJECTION, SOLUTION EPIDURAL; INFILTRATION; INTRACAUDAL; PERINEURAL at 10:55

## 2023-07-07 ASSESSMENT — PAIN - FUNCTIONAL ASSESSMENT: PAIN_FUNCTIONAL_ASSESSMENT: NONE - DENIES PAIN

## 2023-07-07 ASSESSMENT — ENCOUNTER SYMPTOMS: SHORTNESS OF BREATH: 1

## 2023-07-07 ASSESSMENT — PAIN SCALES - GENERAL: PAINLEVEL_OUTOF10: 0

## 2023-07-07 NOTE — PROGRESS NOTES
Admitted to [facility] for Egd and Colonoscopy. Consents signed. Call light within reach.

## 2023-07-07 NOTE — PROGRESS NOTES
Recovery mode. Denies discomfort, passing gas, taking fluids. Dr. Jimena Santillan discussed findings and plan of care with patient and . Discharge instructions provided, understanding verbalized.

## 2023-07-07 NOTE — ANESTHESIA PRE PROCEDURE
Department of Anesthesiology  Preprocedure Note       Name:  Evangelina Harper   Age:  63 y.o.  :  1960                                          MRN:  [id number]         Date:  2023      Surgeon: Leoncio Salmon):  Nish Vaughan MD    Procedure: Procedure(s):  Colonoscopy  EGD    Medications prior to admission:   Prior to Admission medications    Medication Sig Start Date End Date Taking? Authorizing Provider   gabapentin (NEURONTIN) 300 MG capsule Take 1 capsule by mouth nightly for 30 days. 23  SAHIL Atkinson CNP   oxyCODONE-acetaminophen (PERCOCET)  MG per tablet Take 1 tablet by mouth every 8 hours as needed for Pain for up to 30 days.  Intended supply: 30 days 23  SAHIL Atkinson CNP   primidone (MYSOLINE) 50 MG tablet Take 1 tablet by mouth in the morning and at bedtime 23   SAHIL Smallwood CNP   Psyllium 48.57 % POWD Psyllium Seed (Sugar) (Metamucil) 48.57 % powder Active 48.57 % PO Four Times Daily 14 May 7th, 2013 12:42am 13   Historical Provider, MD   tamsulosin (FLOMAX) 0.4 MG capsule Take 1 capsule by mouth daily for 10 days For stent pain and passage of small fragments 3/2/23 3/29/23  Yuliya [place] Angeles, MD   sevelamer (RENVELA) 800 MG tablet Take 1 tablet by mouth 3 times daily (with meals)    Historical Provider, MD   doxepin (SINEQUAN) 25 MG capsule Take 1 capsule by mouth nightly 2/10/23   SAHIL Lamb CNP   Methoxy PEG-Epoetin Beta (MIRCERA IJ) Inject 200 mcg into the skin every 14 days Last dose last 22   Historical Provider, MD   mometasone-formoterol (DULERA) 100-5 MCG/ACT inhaler Inhale 2 puffs into the lungs in the morning and at bedtime 22   Historical Provider, MD   montelukast (SINGULAIR) 10 MG tablet Take 1 tablet by mouth nightly 10/26/22   Historical Provider, MD   naloxone 4 MG/0.1ML LIQD nasal spray 1 spray by Nasal route as needed 22   Historical Provider, MD   carvedilol (COREG) 25

## 2023-07-07 NOTE — DISCHARGE INSTRUCTIONS
ANTI-REFLUX INSTRUCTIONS  HIGH FIBER DIET  FIBER SUPPLEMENTS  FOLLOW UP COLON IN 10 YRS  PLEASE CALL OUR OFFICE IF ANY GI PROBLEMS.

## 2023-07-07 NOTE — OP NOTE
[place] [address]                                OPERATIVE REPORT    PATIENT NAME: Radha Alcantar                   :        1960  MED REC NO:   [id number]                           ROOM:  ACCOUNT NO:   [de-identified]                           ADMIT DATE: 2023  PROVIDER:     Yadiel Vazquez M.D.    DATE OF PROCEDURE:  2023    PROCEDURE:  Esophagogastroduodenoscopy and colonoscopy. INDICATION:  The patient is a 71-year-old pleasant male, who has anemia,  heartburn, generalized abdominal pain, personal history of polyps. He  is undergoing further evaluation. Please see my brief history for  details and for physical examination. ASA CLASSIFICATION:  As per Anesthesia. Please see Anesthesia note for  details. INSTRUMENTS:  PCF-H190AL pediatric colonoscope. PHOTOGRAPHS:  Yes. BIOPSIES:  No.    ESTIMATED BLOOD LOSS:  None. CONSENT:  Procedure indications and complications including but not  limited to perforation, bleeding, infection, adverse reaction to  medicine, very slight chance of missing significant lesions discussed  with the patient and the patient expressed his understanding and a  written consent was obtained. The patient was placed in the left lateral decubitus position in the  endo room #11. The patient was placed on appropriate monitoring of  vitals including blood pressure, heart rate and pulse ox. ESOPHAGOGASTRODUODENOSCOPY REPORT:  Oropharynx was sprayed with  Cetacaine spray and bite block was placed between maxilla and mandible. After the adequate total intravenous anesthesia was given by Anesthesia,  the PCF-H190AL pediatric colonoscope was inserted into the oropharynx  and the esophagus was intubated under direct vision. The scope was  advanced down through the stomach into second portion of duodenum.   On  careful inspection and on withdrawal of the scope, the duodenum

## 2023-07-07 NOTE — ANESTHESIA POSTPROCEDURE EVALUATION
Department of Anesthesiology  Postprocedure Note    Patient: Tana Hugo  MRN: [id number]  YOB: 1960  Date of evaluation: 7/7/2023      Procedure Summary     Date: 07/07/23 Room / Location: [address] [facility] [address] / [address] [place] [address]    Anesthesia Start: 1054 Anesthesia Stop: 1114    Procedures:       Colonoscopy (Anus)      EGD Diagnosis:       Constipation, unspecified constipation type      Diarrhea, unspecified type      Heartburn      Nausea and vomiting, unspecified vomiting type      (Constipation, unspecified constipation type [K59.00])      (Diarrhea, unspecified type [R19.7])      (Heartburn [R12])      (Nausea and vomiting, unspecified vomiting type [R11.2])    Surgeons: Citlalli Calixto MD Responsible Provider: Dusty Chavez DO    Anesthesia Type: General, MAC ASA Status: 4          Anesthesia Type: General, MAC    Meche Phase I: Meche Score: 10    Meche Phase II:        Anesthesia Post Evaluation    Patient location during evaluation: bedside  Level of consciousness: sleepy but conscious  Airway patency: patent  Nausea & Vomiting: no vomiting  Complications: no  Cardiovascular status: blood pressure returned to baseline and hemodynamically stable  Respiratory status: acceptable and nasal cannula  Hydration status: stable

## 2023-07-07 NOTE — BRIEF OP NOTE
Brief Postoperative Note      Patient: Marbin Hutchison  YOB: 1960  MRN: [id number]    Date of Procedure: 7/7/2023    Pre-Op Diagnosis Codes:     * Constipation, unspecified constipation type [K59.00]     * Diarrhea, unspecified type [R19.7]     * Heartburn [R12]     * Nausea and vomiting, unspecified vomiting type [R11.2]    Post-Op Diagnosis:  NORMAL EGD, COLON DIVERTICULOSIS,       Procedure(s):  Colonoscopy  EGD    Surgeon(s):  Kuldip Rizvi MD    Assistant:  * No surgical staff found *    Anesthesia: Monitor Anesthesia Care    Estimated Blood Loss (mL): Minimal    Complications: None    Specimens:   * No specimens in log *    Implants:  * No implants in log *      Drains: * No LDAs found *    Findings: NORMAL EGD, COLON DIVERTICULOSIS,      Electronically signed by Kuldip Rizvi MD on 7/7/2023 at 11:14 AM

## 2023-07-07 NOTE — PROGRESS NOTES
EGD completed, Pictures taken. Pt tolerated well. Colonoscopy completed, Pictures taken. Pt tolerated well.      Scope

## 2023-07-25 DIAGNOSIS — G89.4 CHRONIC PAIN SYNDROME: ICD-10-CM

## 2023-07-26 RX ORDER — GABAPENTIN 300 MG/1
300 CAPSULE ORAL NIGHTLY
Qty: 30 CAPSULE | Refills: 0 | Status: SHIPPED | OUTPATIENT
Start: 2023-07-29 | End: 2023-08-28

## 2023-07-26 RX ORDER — OXYCODONE AND ACETAMINOPHEN 10; 325 MG/1; MG/1
1 TABLET ORAL EVERY 8 HOURS PRN
Qty: 90 TABLET | Refills: 0 | Status: SHIPPED | OUTPATIENT
Start: 2023-07-29 | End: 2023-08-28

## 2023-07-26 NOTE — TELEPHONE ENCOUNTER
This patient was a no show today but appointment was rescheduled to next week. Sent medications in good raji and will evaluate him next week.

## 2023-07-26 NOTE — TELEPHONE ENCOUNTER
OARRS reviewed. UDS: + for  gabapentin and oxycodone. Last seen: 6/7/2023.  Follow-up:   Future Appointments   Date Time Provider [address]   7/26/2023  1:00 PM SAHIL Red - CNP N [facility]   8/2/2023  3:00 PM Lea Santos [facility] Heart [facility]   8/8/2023  3:30 PM Chris Gordon DO [facility]   8/11/2023 12:00 PM Abbey Henley, [address]   8/21/2023  3:00 PM Yasmin Connell APRN - [address] [facility],[address] Neurology -   11/16/2023 12:45 PM Concepcion Guzman, [address]

## 2023-07-31 NOTE — PROGRESS NOTES
[facility] PROF[facility]  [facility] OF [address] [address]   Dept: [phone]   Dept Fax: [phone]   Loc: [phone]      Chief Complaint   Patient presents with    Follow-up    Congestive Heart Failure     Cardiologist:  Dr. Yamile Blake  62 yo male presents for 3 month f/u. Hx of cardiorenal on dialysis. EF 55%, DM2, ESRD on dialysis, deisy on bipap. Still on oxygen. Breathing is doing okay. Orthopnea, sleeping in recliner. Doing well overall. Swelling controlled. No chest pain any more. Still lower energy. Fistula has matured, waiting on tmperoary to come out now. General:   No fever, no chills, no weight loss, + fatigue  Pulmonary:    + dyspnea, no wheezing  Cardiac:    + recent chest pain   GI:     No nausea or vomiting, no abdominal pain  Neuro:     + dizziness or light headedness  Musculoskeletal:  No recent active issues  Extremities:   trace edema      Past Medical History:   Diagnosis Date    Arthritis     Back problem     back pain-sees [facility] pain mgmt    Bladder disease     Dr. Lucero Li    CHF with unknown LVEF ([facility]) 05/24/2021    Dr. George/CHF clinic    Chronic kidney disease     Constipation     Diabetes mellitus ([facility])     Dr. Natan Hines    Hypertension     Hypertensive emergency 04/11/2019    Hypertensive urgency 04/13/2019    Sleep apnea     has bipap-sees AZRA Olmedo CNP    Thyroid disease        Allergies   Allergen Reactions    Azithromycin Itching     Hands swell and blister      Shellfish-Derived Products Swelling     Tolerates IV dye without any problems      Pcn [Penicillins] Itching    Succinylcholine      Pseudocholinesterase Deficiency    Sulfa Antibiotics Itching    Morphine Nausea And Vomiting     \"head swimming, skin crawling\"    Tape Jameebutch Cox Tape] Rash       Current Outpatient Medications   Medication Sig Dispense Refill    gabapentin (NEURONTIN) 300 MG capsule Take 1 capsule by mouth nightly for 30 days.  30 capsule 0

## 2023-08-02 ENCOUNTER — OFFICE VISIT (OUTPATIENT)
Dept: PHYSICAL MEDICINE AND REHAB | Age: 63
End: 2023-08-02
Payer: MEDICARE

## 2023-08-02 ENCOUNTER — OFFICE VISIT (OUTPATIENT)
Dept: CARDIOLOGY CLINIC | Age: 63
End: 2023-08-02
Payer: MEDICARE

## 2023-08-02 ENCOUNTER — TELEPHONE (OUTPATIENT)
Dept: CARDIOLOGY CLINIC | Age: 63
End: 2023-08-02

## 2023-08-02 VITALS
WEIGHT: 285 LBS | BODY MASS INDEX: 37.77 KG/M2 | DIASTOLIC BLOOD PRESSURE: 82 MMHG | SYSTOLIC BLOOD PRESSURE: 134 MMHG | HEIGHT: 73 IN

## 2023-08-02 VITALS
OXYGEN SATURATION: 96 % | HEART RATE: 84 BPM | WEIGHT: 293 LBS | HEIGHT: 73 IN | SYSTOLIC BLOOD PRESSURE: 121 MMHG | DIASTOLIC BLOOD PRESSURE: 70 MMHG | BODY MASS INDEX: 38.83 KG/M2

## 2023-08-02 DIAGNOSIS — G89.29 CHRONIC PAIN OF BOTH KNEES: ICD-10-CM

## 2023-08-02 DIAGNOSIS — M17.0 PRIMARY OSTEOARTHRITIS OF BOTH KNEES: ICD-10-CM

## 2023-08-02 DIAGNOSIS — N18.6 ESRD ON DIALYSIS (HCC): ICD-10-CM

## 2023-08-02 DIAGNOSIS — M47.816 SPONDYLOSIS OF LUMBAR REGION WITHOUT MYELOPATHY OR RADICULOPATHY: Primary | ICD-10-CM

## 2023-08-02 DIAGNOSIS — M48.062 SPINAL STENOSIS OF LUMBAR REGION WITH NEUROGENIC CLAUDICATION: ICD-10-CM

## 2023-08-02 DIAGNOSIS — I50.32 CHF (CONGESTIVE HEART FAILURE), NYHA CLASS II, CHRONIC, DIASTOLIC (HCC): ICD-10-CM

## 2023-08-02 DIAGNOSIS — G89.4 CHRONIC PAIN SYNDROME: ICD-10-CM

## 2023-08-02 DIAGNOSIS — M47.816 LUMBAR FACET ARTHROPATHY: ICD-10-CM

## 2023-08-02 DIAGNOSIS — M25.511 CHRONIC RIGHT SHOULDER PAIN: ICD-10-CM

## 2023-08-02 DIAGNOSIS — G62.9 NEUROPATHY: ICD-10-CM

## 2023-08-02 DIAGNOSIS — M25.561 CHRONIC PAIN OF BOTH KNEES: ICD-10-CM

## 2023-08-02 DIAGNOSIS — Z99.2 ESRD ON DIALYSIS (HCC): ICD-10-CM

## 2023-08-02 DIAGNOSIS — I10 PRIMARY HYPERTENSION: Primary | ICD-10-CM

## 2023-08-02 DIAGNOSIS — G89.29 CHRONIC RIGHT SHOULDER PAIN: ICD-10-CM

## 2023-08-02 DIAGNOSIS — M25.562 CHRONIC PAIN OF BOTH KNEES: ICD-10-CM

## 2023-08-02 PROCEDURE — 3017F COLORECTAL CA SCREEN DOC REV: CPT | Performed by: STUDENT IN AN ORGANIZED HEALTH CARE EDUCATION/TRAINING PROGRAM

## 2023-08-02 PROCEDURE — G8427 DOCREV CUR MEDS BY ELIG CLIN: HCPCS | Performed by: STUDENT IN AN ORGANIZED HEALTH CARE EDUCATION/TRAINING PROGRAM

## 2023-08-02 PROCEDURE — 4004F PT TOBACCO SCREEN RCVD TLK: CPT | Performed by: STUDENT IN AN ORGANIZED HEALTH CARE EDUCATION/TRAINING PROGRAM

## 2023-08-02 PROCEDURE — 3078F DIAST BP <80 MM HG: CPT | Performed by: STUDENT IN AN ORGANIZED HEALTH CARE EDUCATION/TRAINING PROGRAM

## 2023-08-02 PROCEDURE — G8417 CALC BMI ABV UP PARAM F/U: HCPCS | Performed by: NURSE PRACTITIONER

## 2023-08-02 PROCEDURE — G8427 DOCREV CUR MEDS BY ELIG CLIN: HCPCS | Performed by: NURSE PRACTITIONER

## 2023-08-02 PROCEDURE — 3074F SYST BP LT 130 MM HG: CPT | Performed by: STUDENT IN AN ORGANIZED HEALTH CARE EDUCATION/TRAINING PROGRAM

## 2023-08-02 PROCEDURE — 3079F DIAST BP 80-89 MM HG: CPT | Performed by: NURSE PRACTITIONER

## 2023-08-02 PROCEDURE — 99214 OFFICE O/P EST MOD 30 MIN: CPT | Performed by: STUDENT IN AN ORGANIZED HEALTH CARE EDUCATION/TRAINING PROGRAM

## 2023-08-02 PROCEDURE — 4004F PT TOBACCO SCREEN RCVD TLK: CPT | Performed by: NURSE PRACTITIONER

## 2023-08-02 PROCEDURE — 3075F SYST BP GE 130 - 139MM HG: CPT | Performed by: NURSE PRACTITIONER

## 2023-08-02 PROCEDURE — G8417 CALC BMI ABV UP PARAM F/U: HCPCS | Performed by: STUDENT IN AN ORGANIZED HEALTH CARE EDUCATION/TRAINING PROGRAM

## 2023-08-02 PROCEDURE — 99214 OFFICE O/P EST MOD 30 MIN: CPT | Performed by: NURSE PRACTITIONER

## 2023-08-02 PROCEDURE — 3017F COLORECTAL CA SCREEN DOC REV: CPT | Performed by: NURSE PRACTITIONER

## 2023-08-02 RX ORDER — NITROGLYCERIN 0.4 MG/1
0.4 TABLET SUBLINGUAL EVERY 5 MIN PRN
Qty: 25 TABLET | Refills: 3 | Status: SHIPPED | OUTPATIENT
Start: 2023-08-02

## 2023-08-02 ASSESSMENT — ENCOUNTER SYMPTOMS
COUGH: 0
SHORTNESS OF BREATH: 0
BACK PAIN: 1
CONSTIPATION: 1
DIARRHEA: 1
CHEST TIGHTNESS: 0
STRIDOR: 0
WHEEZING: 0
ABDOMINAL PAIN: 0
CHOKING: 0

## 2023-08-02 NOTE — PROGRESS NOTES
[address] [place] [address]  Dept: [phone]  Dept Fax: [phone]: [phone]    Visit Date: 8/2/2023    Functionality Assessment/Goals Worksheet     On a scale of 0 (Does not Interfere) to 10 (Completely Interferes)     1. Which number describes how during the past week pain has interfered with       the following:  A. General Activity:  10  B. Mood: 10  C. Walking Ability:  10  D. Normal Work (Includes both work outside the home and housework):  10  E. Relations with Other People:   9  F. Sleep:   10  G. Enjoyment of Life:   10    2. Patient Prefers to Take their Pain Medications:     []  On a regular basis   []  Only when necessary    []  Does not take pain medications    3. What are the Patient's Goals/Expectations for Visiting Pain Management? []  Learn about my pain    [x]  Receive Medication   []  Physical Therapy     []  Treat Depression   [x]  Receive Injections    []  Treat Sleep   []  Deal with Anxiety and Stress   []  Treat Opoid Dependence/Addiction   []  Other:      HPI:   Sury Edwards is a 63 y.o. male is here today for    Chief Complaint: Low back pain     Patients wife present     HPI   2 month FU. Patient has a main complaint of pain in low back mainly axial across most bothersome right side- throbbing tooth ache and sometimes sharp and stabbing. Pain is constant. Feels that this pain has been intense and worse. Having continued pain in right shoulder and having new pain of intermittent electric shock pain under right arm radiating to elbow   Continues dialysis 4 times per week. Current pain medications help take the edge off. Pain increases with bending, lifting, walking, standing, getting up and down, and housework or working at job [employer]", damp weather        Medications reviewed. Patient denies side effects with medications.

## 2023-08-02 NOTE — TELEPHONE ENCOUNTER
Pt last seen Dr. Jacek Doan 1-5-23. Last seen by Juvenal Ferraro 4-13-23 and 8-2-23. Pt is needing clearance for hemodialysis cath removal on 8-14-23 with [facility]. Can pt stop ASA 5 days prior?     Fax [phone]

## 2023-08-08 SDOH — HEALTH STABILITY: PHYSICAL HEALTH: ON AVERAGE, HOW MANY DAYS PER WEEK DO YOU ENGAGE IN MODERATE TO STRENUOUS EXERCISE (LIKE A BRISK WALK)?: 0 DAYS

## 2023-08-08 SDOH — HEALTH STABILITY: PHYSICAL HEALTH: ON AVERAGE, HOW MANY MINUTES DO YOU ENGAGE IN EXERCISE AT THIS LEVEL?: 0 MIN

## 2023-08-08 ASSESSMENT — SOCIAL DETERMINANTS OF HEALTH (SDOH)
WITHIN THE LAST YEAR, HAVE YOU BEEN AFRAID OF YOUR PARTNER OR EX-PARTNER?: NO
WITHIN THE LAST YEAR, HAVE YOU BEEN KICKED, HIT, SLAPPED, OR OTHERWISE PHYSICALLY HURT BY YOUR PARTNER OR EX-PARTNER?: NO
WITHIN THE LAST YEAR, HAVE TO BEEN RAPED OR FORCED TO HAVE ANY KIND OF SEXUAL ACTIVITY BY YOUR PARTNER OR EX-PARTNER?: NO
WITHIN THE LAST YEAR, HAVE YOU BEEN HUMILIATED OR EMOTIONALLY ABUSED IN OTHER WAYS BY YOUR PARTNER OR EX-PARTNER?: NO

## 2023-08-08 NOTE — TELEPHONE ENCOUNTER
Anabell from [facility] calling regarding this--wants to know status of clearance.  Pt will need to start holding ASA tomorrow if cleared    Anabell: [phone]

## 2023-08-09 NOTE — TELEPHONE ENCOUNTER
Anabell notified at dialysis. Form out for Dr. Maral Carrillo to sign. LM for patient to call office back.

## 2023-08-10 ENCOUNTER — OFFICE VISIT (OUTPATIENT)
Dept: INTERNAL MEDICINE CLINIC | Age: 63
End: 2023-08-10

## 2023-08-10 VITALS
OXYGEN SATURATION: 97 % | RESPIRATION RATE: 20 BRPM | TEMPERATURE: 98 F | SYSTOLIC BLOOD PRESSURE: 132 MMHG | WEIGHT: 300.4 LBS | HEART RATE: 70 BPM | BODY MASS INDEX: 39.63 KG/M2 | DIASTOLIC BLOOD PRESSURE: 80 MMHG

## 2023-08-10 DIAGNOSIS — N18.6 ESRD (END STAGE RENAL DISEASE) (HCC): ICD-10-CM

## 2023-08-10 DIAGNOSIS — Z90.49 S/P PARTIAL RESECTION OF COLON: ICD-10-CM

## 2023-08-10 DIAGNOSIS — K59.00 CONSTIPATION, UNSPECIFIED CONSTIPATION TYPE: Primary | ICD-10-CM

## 2023-08-10 DIAGNOSIS — N18.4 ANEMIA OF CHRONIC RENAL FAILURE, STAGE 4 (SEVERE) (HCC): ICD-10-CM

## 2023-08-10 DIAGNOSIS — E03.9 HYPOTHYROIDISM, UNSPECIFIED TYPE: ICD-10-CM

## 2023-08-10 DIAGNOSIS — G47.33 OBSTRUCTIVE SLEEP APNEA: ICD-10-CM

## 2023-08-10 DIAGNOSIS — K90.9 DIARRHEA DUE TO MALABSORPTION: ICD-10-CM

## 2023-08-10 DIAGNOSIS — E11.9 TYPE 2 DIABETES MELLITUS WITHOUT COMPLICATION, WITHOUT LONG-TERM CURRENT USE OF INSULIN (HCC): ICD-10-CM

## 2023-08-10 DIAGNOSIS — F32.9 MAJOR DEPRESSIVE DISORDER WITH CURRENT ACTIVE EPISODE, UNSPECIFIED DEPRESSION EPISODE SEVERITY, UNSPECIFIED WHETHER RECURRENT: ICD-10-CM

## 2023-08-10 DIAGNOSIS — K21.9 GASTROESOPHAGEAL REFLUX DISEASE WITHOUT ESOPHAGITIS: ICD-10-CM

## 2023-08-10 DIAGNOSIS — I50.9 CHF (NYHA CLASS III, ACC/AHA STAGE C) (HCC): ICD-10-CM

## 2023-08-10 DIAGNOSIS — D63.1 ANEMIA OF CHRONIC RENAL FAILURE, STAGE 4 (SEVERE) (HCC): ICD-10-CM

## 2023-08-10 DIAGNOSIS — E78.5 HYPERLIPIDEMIA, UNSPECIFIED HYPERLIPIDEMIA TYPE: ICD-10-CM

## 2023-08-10 DIAGNOSIS — I10 PRIMARY HYPERTENSION: ICD-10-CM

## 2023-08-10 DIAGNOSIS — R19.7 ACUTE DIARRHEA: ICD-10-CM

## 2023-08-10 DIAGNOSIS — R19.7 DIARRHEA DUE TO MALABSORPTION: ICD-10-CM

## 2023-08-10 LAB — HBA1C MFR BLD: 4.8 % (ref 4.3–5.7)

## 2023-08-10 RX ORDER — L. ACIDOPHILUS/L.BULGARICUS 1MM CELL
1 TABLET ORAL DAILY
Qty: 30 TABLET | Refills: 3 | Status: SHIPPED | OUTPATIENT
Start: 2023-08-10 | End: 2023-12-08

## 2023-08-10 RX ORDER — B,C/FOLIC/ZINC/SELENOMETH/D3/E 0.8-12.5MG
1 TABLET ORAL DAILY
COMMUNITY
Start: 2023-08-02

## 2023-08-10 RX ORDER — OXYBUTYNIN CHLORIDE 5 MG/1
5 TABLET, EXTENDED RELEASE ORAL DAILY
COMMUNITY
Start: 2023-06-23

## 2023-08-10 RX ORDER — DOCUSATE SODIUM 100 MG/1
100 CAPSULE, LIQUID FILLED ORAL 2 TIMES DAILY
Qty: 60 CAPSULE | Refills: 0 | Status: SHIPPED | OUTPATIENT
Start: 2023-08-10 | End: 2023-09-09

## 2023-08-10 RX ORDER — PANTOPRAZOLE SODIUM 40 MG/1
40 TABLET, DELAYED RELEASE ORAL
Qty: 30 TABLET | Refills: 0 | Status: SHIPPED | OUTPATIENT
Start: 2023-08-10

## 2023-08-10 SDOH — ECONOMIC STABILITY: FOOD INSECURITY: WITHIN THE PAST 12 MONTHS, THE FOOD YOU BOUGHT JUST DIDN'T LAST AND YOU DIDN'T HAVE MONEY TO GET MORE.: NEVER TRUE

## 2023-08-10 SDOH — ECONOMIC STABILITY: HOUSING INSECURITY
IN THE LAST 12 MONTHS, WAS THERE A TIME WHEN YOU DID NOT HAVE A STEADY PLACE TO SLEEP OR SLEPT IN A SHELTER (INCLUDING NOW)?: NO

## 2023-08-10 SDOH — ECONOMIC STABILITY: INCOME INSECURITY: HOW HARD IS IT FOR YOU TO PAY FOR THE VERY BASICS LIKE FOOD, HOUSING, MEDICAL CARE, AND HEATING?: NOT HARD AT ALL

## 2023-08-10 SDOH — ECONOMIC STABILITY: FOOD INSECURITY: WITHIN THE PAST 12 MONTHS, YOU WORRIED THAT YOUR FOOD WOULD RUN OUT BEFORE YOU GOT MONEY TO BUY MORE.: NEVER TRUE

## 2023-08-10 ASSESSMENT — PATIENT HEALTH QUESTIONNAIRE - PHQ9
SUM OF ALL RESPONSES TO PHQ QUESTIONS 1-9: 2
2. FEELING DOWN, DEPRESSED OR HOPELESS: 1
SUM OF ALL RESPONSES TO PHQ9 QUESTIONS 1 & 2: 2
1. LITTLE INTEREST OR PLEASURE IN DOING THINGS: 1
SUM OF ALL RESPONSES TO PHQ QUESTIONS 1-9: 2

## 2023-08-10 NOTE — PROGRESS NOTES
1960    Chief Complaint   Patient presents with    New Patient     Establish care       Pt is a 63 y.o. male who presents for an initial visit. He previously saw Dr Camille Garrett, PCP. Patient presents to establish care. Noted significant PMHx of CHF, non-obstructive CAD, chronic anemia, ESRD on HD, hypothyroidism. Patient follows with cardiology, CHF clinic for heart disease. Currently, bp stable on current regimen. Has a history of ESRD on home HD three times weekly, follows with Dr. Dusty Cazares. Does have not hx BPH with resultant hematuria s/p negative cystoscope that he sees urology for. He is currently here to establish care. He currently denies any CP, SOB, abdominal pain. He is getting worked up for transplant at [facility] currently. He states he is currently having diarrhea, nausea, vomiting, heartburn symptoms. He does not currently have follow-up with GI per patient. He has history of extended R-colon resection 2/2 hernia mesh and subsequent adhesions and frequent bowel obstructions. He states he has current constipation, takes PO laxatives at home that result in significant diarrhea. He has trialed fiber supplements, both natural and synthetic with no improvement. Patient endorses significant life-impairment with diarrhea. Willing to see GI again. Also endorsing depression, no HI/SI. Does have hx MDD, no treatment noted. Interested in therapy again. Past Medical History:   Diagnosis Date    Arthritis     Back problem     back pain-sees [facility] pain mgmt    Bladder disease     Dr. Dano Burns    CHF with unknown LVEF ([address] ) 05/24/2021    Dr. George/CHF clinic    Chronic kidney disease     Constipation     Diabetes mellitus ([facility] [address] [facility])     Dr. Dusty Cazares    Hypertension     Hypertensive emergency 04/11/2019    Hypertensive urgency 04/13/2019    Sleep apnea     has bipap-sees [facility] CNP    Thyroid disease        Past Surgical History:   Procedure Laterality Date    ABDOMEN SURGERY      ABDOMEN SURGERY N/A 3/25/2022    ABDOMINAL

## 2023-08-11 ENCOUNTER — HOSPITAL ENCOUNTER (OUTPATIENT)
Age: 63
Discharge: HOME | End: 2023-08-11
Payer: MEDICARE

## 2023-08-11 ENCOUNTER — OFFICE VISIT (OUTPATIENT)
Dept: PULMONOLOGY | Age: 63
End: 2023-08-11
Payer: MEDICARE

## 2023-08-11 VITALS
HEART RATE: 71 BPM | HEIGHT: 73 IN | SYSTOLIC BLOOD PRESSURE: 136 MMHG | DIASTOLIC BLOOD PRESSURE: 76 MMHG | OXYGEN SATURATION: 97 % | WEIGHT: 302.6 LBS | BODY MASS INDEX: 40.11 KG/M2

## 2023-08-11 DIAGNOSIS — G47.09 OTHER INSOMNIA: ICD-10-CM

## 2023-08-11 DIAGNOSIS — G25.81 RLS (RESTLESS LEGS SYNDROME): ICD-10-CM

## 2023-08-11 DIAGNOSIS — E78.5 HYPERLIPIDEMIA, UNSPECIFIED HYPERLIPIDEMIA TYPE: ICD-10-CM

## 2023-08-11 DIAGNOSIS — G47.33 OSA (OBSTRUCTIVE SLEEP APNEA): Primary | ICD-10-CM

## 2023-08-11 LAB
CHOLEST SERPL-MCNC: 141 MG/DL (ref 100–199)
HDLC SERPL-MCNC: 34 MG/DL
LDLC SERPL CALC-MCNC: 61 MG/DL
TRIGL SERPL-MCNC: 232 MG/DL (ref 0–199)

## 2023-08-11 PROCEDURE — 3017F COLORECTAL CA SCREEN DOC REV: CPT

## 2023-08-11 PROCEDURE — 99214 OFFICE O/P EST MOD 30 MIN: CPT

## 2023-08-11 PROCEDURE — G8427 DOCREV CUR MEDS BY ELIG CLIN: HCPCS

## 2023-08-11 PROCEDURE — G8417 CALC BMI ABV UP PARAM F/U: HCPCS

## 2023-08-11 PROCEDURE — 3078F DIAST BP <80 MM HG: CPT

## 2023-08-11 PROCEDURE — 80061 LIPID PANEL: CPT

## 2023-08-11 PROCEDURE — 4004F PT TOBACCO SCREEN RCVD TLK: CPT

## 2023-08-11 PROCEDURE — 36415 COLL VENOUS BLD VENIPUNCTURE: CPT

## 2023-08-11 PROCEDURE — 3075F SYST BP GE 130 - 139MM HG: CPT

## 2023-08-11 RX ORDER — ROPINIROLE 0.5 MG/1
TABLET, FILM COATED ORAL
Qty: 90 TABLET | Refills: 5 | Status: SHIPPED | OUTPATIENT
Start: 2023-08-11

## 2023-08-11 RX ORDER — LANOLIN ALCOHOL/MO/W.PET/CERES
4.5 CREAM (GRAM) TOPICAL NIGHTLY PRN
Qty: 45 TABLET | Refills: 11 | Status: SHIPPED | OUTPATIENT
Start: 2023-08-11

## 2023-08-11 ASSESSMENT — ENCOUNTER SYMPTOMS
WHEEZING: 0
DIARRHEA: 1
SHORTNESS OF BREATH: 1
ALLERGIC/IMMUNOLOGIC NEGATIVE: 1
COUGH: 0

## 2023-08-11 NOTE — PATIENT INSTRUCTIONS
Continue Requip    Continue melatonin    Continue current positive airway pressure therapy. Keep good compliance with current recommended pressure to get optimal results and clinical improvement    Make sure to get 7-9 hours of sleep with PAP    Call DME company regarding supplies if needed. Call office for earlier appointment if needed for worsening of sleep symptoms.      Do not drive if feeling sleepy    Make sure to work on weight loss

## 2023-08-13 PROBLEM — E83.39 OTHER DISORDERS OF PHOSPHORUS METABOLISM: Status: ACTIVE | Noted: 2023-01-12

## 2023-08-13 PROBLEM — E44.0 MODERATE PROTEIN-CALORIE MALNUTRITION (HCC): Status: ACTIVE | Noted: 2022-08-25

## 2023-08-13 PROBLEM — E11.9 DIABETES MELLITUS (HCC): Status: RESOLVED | Noted: 2021-09-22 | Resolved: 2023-08-13

## 2023-08-13 PROBLEM — R77.8 OTHER SPECIFIED ABNORMALITIES OF PLASMA PROTEINS: Status: ACTIVE | Noted: 2023-03-08

## 2023-08-13 PROBLEM — D50.9 IRON DEFICIENCY ANEMIA, UNSPECIFIED: Status: ACTIVE | Noted: 2022-08-17

## 2023-08-14 ENCOUNTER — HOSPITAL ENCOUNTER (OUTPATIENT)
Dept: INTERVENTIONAL RADIOLOGY/VASCULAR | Age: 63
Discharge: HOME | End: 2023-08-14
Payer: MEDICARE

## 2023-08-14 VITALS
RESPIRATION RATE: 18 BRPM | HEART RATE: 53 BPM | TEMPERATURE: 97.6 F | OXYGEN SATURATION: 98 % | SYSTOLIC BLOOD PRESSURE: 134 MMHG | DIASTOLIC BLOOD PRESSURE: 66 MMHG

## 2023-08-14 DIAGNOSIS — N18.6 END STAGE RENAL DISEASE (HCC): ICD-10-CM

## 2023-08-14 PROCEDURE — 6360000002 HC RX W HCPCS: Performed by: RADIOLOGY

## 2023-08-14 PROCEDURE — 2580000003 HC RX 258: Performed by: RADIOLOGY

## 2023-08-14 PROCEDURE — 36589 REMOVAL TUNNELED CV CATH: CPT

## 2023-08-14 PROCEDURE — 2709999900 IR REMOVE TUNNELED CVAD WO SQ PORT/PUMP

## 2023-08-14 PROCEDURE — 6370000000 HC RX 637 (ALT 250 FOR IP): Performed by: RADIOLOGY

## 2023-08-14 RX ORDER — SODIUM CHLORIDE 450 MG/100ML
INJECTION, SOLUTION INTRAVENOUS CONTINUOUS
Status: DISCONTINUED | OUTPATIENT
Start: 2023-08-14 | End: 2023-08-15 | Stop reason: HOSPADM

## 2023-08-14 RX ORDER — ONDANSETRON 2 MG/ML
4 INJECTION INTRAMUSCULAR; INTRAVENOUS ONCE
Status: COMPLETED | OUTPATIENT
Start: 2023-08-14 | End: 2023-08-14

## 2023-08-14 RX ADMIN — ONDANSETRON 4 MG: 2 INJECTION INTRAMUSCULAR; INTRAVENOUS at 09:39

## 2023-08-14 RX ADMIN — SODIUM CHLORIDE: 4.5 INJECTION, SOLUTION INTRAVENOUS at 09:40

## 2023-08-14 RX ADMIN — MUPIROCIN 1 EACH: 20 OINTMENT TOPICAL at 10:41

## 2023-08-14 RX ADMIN — Medication 3000 MG: at 09:44

## 2023-08-14 ASSESSMENT — PAIN SCALES - GENERAL: PAINLEVEL_OUTOF10: 8

## 2023-08-14 ASSESSMENT — PAIN DESCRIPTION - LOCATION: LOCATION: BACK;KNEE

## 2023-08-14 NOTE — DISCHARGE INSTRUCTIONS
DIALYSIS CATHETER DISCHARGE CARE    Diet:  As before    Care of catheter:  Keep dressing clean and dry  Ice to catheter site for next 24 hours (20 minutes every hour)  Limit activity to shoulder (no pushing, pulling, or lifting) for next 3 days  No shower or tub baths until dressing is changed by dialysis nurses    Return to nearest Emergency Room if any of the following:  Symptoms of bleeding, drainage, swelling  Increase in pain  Elevated temperature over 101 degrees    Keep scheduled appointment in dialysis. If you have any problems, call your doctor or return to nearest Emergency Room.

## 2023-08-14 NOTE — PROGRESS NOTES
0830 Patient in wheelchair to [facility] for Dialysis catheter. Patient states he held his Aspirin for 7 days. Confirms NPO and wife at bedside. Patient reports feeling nauseated this am. Not sure if it is his nerves. PT RIGHTS AND RESPONSIBILITIES OFFERED TO PT.     0920 Patient still complains of nausea. Called IR to see if can get something to help his nausea. 5729 Zofran given to patient. 1005 Patient sleeping in bed. Wife denies any needs. 1010 Patient to IR for procedure. 1050 Patient back to room post procedure. Dressing to right chest dry and intact. Area swollen. But no bleeding noted. 1105 Patient resting in bed. Dressing to right chest dry and intact. 1119 Patient resting in bed. Dressing dry and intact. 1145 AVS reviewed with patient. Verbalizes understanding. Patient discharged in wheelchair to [facility].            _M___ Safety:       (Environmental)  [place] to environment  Ensure ID band is correct and in place/ allergy band as needed  Assess for fall risk  Initiate fall precautions as applicable (fall band, side rails, etc.)  Call light within reach  Bed in low position/ wheels locked    _M___ Pain:       Assess pain level and characteristics  Administer analgesics as ordered  Assess effectiveness of pain management and report to MD as needed    _M___ Knowledge Deficit:  Assess baseline knowledge  Provide teaching at level of understanding  Provide teaching via preferred learning method  Evaluate teaching effectiveness    _M___ Hemodynamic/Respiratory Status:       (Pre and Post Procedure Monitoring)  Assess/Monitor vital signs and LOC  Assess Baseline SpO2 prior to any sedation  Obtain weight/height  Assess vital signs/ LOC until patient meets discharge criteria  Monitor procedure site and notify MD of any issues

## 2023-08-14 NOTE — PROGRESS NOTES
1015 Pt arrived in IR for tunneled dialysis catheter removal.   1022 Procedure reviewed with pt and pt agreed to site of procedure and procedure to be performed today. 1024 Right chest prepped for procedure with chloraprep   1028 Procedure started. 1033 Right IJ 28 cm Titan HD catheter removed, tip appears intact. 1040 Procedure complete. Pt tolerated well. Sutures x 1 to old catheter exit site. Gauze ans opsite to right chest old catheter site. Ice pack to right chest.   1043 Pt transported to [facility] via cart. Skin natural for race, warm, dry.  Respirations even and unlabored at rest. No complaints voiced at this time

## 2023-08-15 DIAGNOSIS — F32.9 MAJOR DEPRESSIVE DISORDER WITH CURRENT ACTIVE EPISODE, UNSPECIFIED DEPRESSION EPISODE SEVERITY, UNSPECIFIED WHETHER RECURRENT: ICD-10-CM

## 2023-08-28 DIAGNOSIS — G89.4 CHRONIC PAIN SYNDROME: ICD-10-CM

## 2023-08-29 RX ORDER — GABAPENTIN 300 MG/1
300 CAPSULE ORAL NIGHTLY
Qty: 30 CAPSULE | Refills: 0 | OUTPATIENT
Start: 2023-08-29 | End: 2023-09-28

## 2023-08-29 RX ORDER — OXYCODONE AND ACETAMINOPHEN 10; 325 MG/1; MG/1
1 TABLET ORAL EVERY 8 HOURS PRN
Qty: 90 TABLET | Refills: 0 | Status: SHIPPED | OUTPATIENT
Start: 2023-08-29 | End: 2023-09-28

## 2023-08-29 RX ORDER — GABAPENTIN 300 MG/1
300 CAPSULE ORAL NIGHTLY
Qty: 30 CAPSULE | Refills: 0 | Status: SHIPPED | OUTPATIENT
Start: 2023-08-29 | End: 2023-09-28

## 2023-08-29 NOTE — TELEPHONE ENCOUNTER
OARRS reviewed. UDS: + for  Gabapentin, Doxepin, Oxycodone. Last seen: 8/2/2023.  Follow-up: 10/03/2023

## 2023-08-30 ENCOUNTER — TELEPHONE (OUTPATIENT)
Dept: PULMONOLOGY | Age: 63
End: 2023-08-30

## 2023-08-30 NOTE — TELEPHONE ENCOUNTER
Appears patient has only used machine 4 out of the last 30 days. Please encourage better compliance. It appears patient wore BiPAP only 2 total days after his pressure was changed. Please see what difficulties he is having with BiPAP use. Thanks!

## 2023-08-30 NOTE — TELEPHONE ENCOUNTER
Patient states that his reason for non-compliance is having really bad diarrhea (which his doctors are working on). He states that he is sleeping downstairs in his chair since it is closer to his bathroom. He states that pressures were fine on his PAP machine when he was able to use it. CHANTALE.

## 2023-08-30 NOTE — TELEPHONE ENCOUNTER
I do not see that we have received 2 week DL after pressure change from DME. Please pull 2 week DL for review.  Thank you!!

## 2023-09-01 DIAGNOSIS — K21.9 GASTROESOPHAGEAL REFLUX DISEASE WITHOUT ESOPHAGITIS: ICD-10-CM

## 2023-09-06 RX ORDER — PANTOPRAZOLE SODIUM 40 MG/1
TABLET, DELAYED RELEASE ORAL
Qty: 90 TABLET | Refills: 3 | Status: SHIPPED | OUTPATIENT
Start: 2023-09-06 | End: 2023-09-20 | Stop reason: SDUPTHER

## 2023-09-20 DIAGNOSIS — G25.81 RLS (RESTLESS LEGS SYNDROME): ICD-10-CM

## 2023-09-20 DIAGNOSIS — G47.09 OTHER INSOMNIA: ICD-10-CM

## 2023-09-20 DIAGNOSIS — F32.9 MAJOR DEPRESSIVE DISORDER WITH CURRENT ACTIVE EPISODE, UNSPECIFIED DEPRESSION EPISODE SEVERITY, UNSPECIFIED WHETHER RECURRENT: ICD-10-CM

## 2023-09-20 DIAGNOSIS — G89.4 CHRONIC PAIN SYNDROME: ICD-10-CM

## 2023-09-20 DIAGNOSIS — K21.9 GASTROESOPHAGEAL REFLUX DISEASE WITHOUT ESOPHAGITIS: ICD-10-CM

## 2023-09-20 RX ORDER — GABAPENTIN 300 MG/1
300 CAPSULE ORAL NIGHTLY
Qty: 90 CAPSULE | Refills: 2 | Status: SHIPPED | OUTPATIENT
Start: 2023-09-20 | End: 2024-06-16

## 2023-09-20 RX ORDER — PRIMIDONE 50 MG/1
50 TABLET ORAL 2 TIMES DAILY
Qty: 180 TABLET | Refills: 12 | Status: SHIPPED | OUTPATIENT
Start: 2023-09-20

## 2023-09-20 RX ORDER — DOXEPIN HYDROCHLORIDE 25 MG/1
25 CAPSULE ORAL NIGHTLY
Qty: 90 CAPSULE | Refills: 3 | Status: SHIPPED | OUTPATIENT
Start: 2023-09-20

## 2023-09-20 RX ORDER — PANTOPRAZOLE SODIUM 40 MG/1
40 TABLET, DELAYED RELEASE ORAL
Qty: 90 TABLET | Refills: 3 | Status: SHIPPED | OUTPATIENT
Start: 2023-09-20

## 2023-09-20 RX ORDER — MONTELUKAST SODIUM 10 MG/1
10 TABLET ORAL NIGHTLY
Qty: 90 TABLET | Refills: 12 | Status: SHIPPED | OUTPATIENT
Start: 2023-09-20

## 2023-09-20 RX ORDER — ROPINIROLE 0.5 MG/1
TABLET, FILM COATED ORAL
Qty: 270 TABLET | Refills: 3 | Status: SHIPPED | OUTPATIENT
Start: 2023-09-20

## 2023-09-20 RX ORDER — LEVOTHYROXINE SODIUM 175 UG/1
175 TABLET ORAL DAILY
Qty: 90 TABLET | Refills: 3 | Status: SHIPPED | OUTPATIENT
Start: 2023-09-20

## 2023-10-03 ENCOUNTER — OFFICE VISIT (OUTPATIENT)
Dept: PHYSICAL MEDICINE AND REHAB | Age: 63
End: 2023-10-03
Payer: MEDICARE

## 2023-10-03 VITALS
SYSTOLIC BLOOD PRESSURE: 132 MMHG | WEIGHT: 302 LBS | DIASTOLIC BLOOD PRESSURE: 78 MMHG | HEIGHT: 73 IN | BODY MASS INDEX: 40.02 KG/M2

## 2023-10-03 DIAGNOSIS — G89.4 CHRONIC PAIN SYNDROME: ICD-10-CM

## 2023-10-03 DIAGNOSIS — G89.29 CHRONIC RIGHT SHOULDER PAIN: ICD-10-CM

## 2023-10-03 DIAGNOSIS — G89.29 CHRONIC PAIN OF BOTH KNEES: ICD-10-CM

## 2023-10-03 DIAGNOSIS — M47.816 SPONDYLOSIS OF LUMBAR REGION WITHOUT MYELOPATHY OR RADICULOPATHY: Primary | ICD-10-CM

## 2023-10-03 DIAGNOSIS — M25.511 CHRONIC RIGHT SHOULDER PAIN: ICD-10-CM

## 2023-10-03 DIAGNOSIS — G62.9 NEUROPATHY: ICD-10-CM

## 2023-10-03 DIAGNOSIS — M25.561 CHRONIC PAIN OF BOTH KNEES: ICD-10-CM

## 2023-10-03 DIAGNOSIS — M48.062 SPINAL STENOSIS OF LUMBAR REGION WITH NEUROGENIC CLAUDICATION: ICD-10-CM

## 2023-10-03 DIAGNOSIS — M17.0 PRIMARY OSTEOARTHRITIS OF BOTH KNEES: ICD-10-CM

## 2023-10-03 DIAGNOSIS — M47.816 LUMBAR FACET ARTHROPATHY: ICD-10-CM

## 2023-10-03 DIAGNOSIS — M25.562 CHRONIC PAIN OF BOTH KNEES: ICD-10-CM

## 2023-10-03 PROCEDURE — G8484 FLU IMMUNIZE NO ADMIN: HCPCS | Performed by: NURSE PRACTITIONER

## 2023-10-03 PROCEDURE — 3017F COLORECTAL CA SCREEN DOC REV: CPT | Performed by: NURSE PRACTITIONER

## 2023-10-03 PROCEDURE — 99214 OFFICE O/P EST MOD 30 MIN: CPT | Performed by: NURSE PRACTITIONER

## 2023-10-03 PROCEDURE — 3075F SYST BP GE 130 - 139MM HG: CPT | Performed by: NURSE PRACTITIONER

## 2023-10-03 PROCEDURE — 4004F PT TOBACCO SCREEN RCVD TLK: CPT | Performed by: NURSE PRACTITIONER

## 2023-10-03 PROCEDURE — G8417 CALC BMI ABV UP PARAM F/U: HCPCS | Performed by: NURSE PRACTITIONER

## 2023-10-03 PROCEDURE — G8427 DOCREV CUR MEDS BY ELIG CLIN: HCPCS | Performed by: NURSE PRACTITIONER

## 2023-10-03 PROCEDURE — 3078F DIAST BP <80 MM HG: CPT | Performed by: NURSE PRACTITIONER

## 2023-10-03 RX ORDER — OXYCODONE AND ACETAMINOPHEN 10; 325 MG/1; MG/1
1 TABLET ORAL EVERY 8 HOURS PRN
Qty: 90 TABLET | Refills: 0 | Status: SHIPPED | OUTPATIENT
Start: 2023-10-03 | End: 2023-11-02

## 2023-10-03 ASSESSMENT — ENCOUNTER SYMPTOMS
SHORTNESS OF BREATH: 0
GASTROINTESTINAL NEGATIVE: 1
COUGH: 0
CONSTIPATION: 0
ABDOMINAL PAIN: 0
CHEST TIGHTNESS: 0
STRIDOR: 0
WHEEZING: 0
BACK PAIN: 1
DIARRHEA: 0
CHOKING: 0

## 2023-10-03 NOTE — PROGRESS NOTES
[address]  [facility]  [address] [employer] [facility]  Dept: [phone]  Dept Fax: [phone]: [phone]    Visit Date: 10/3/2023    Functionality Assessment/Goals Worksheet     On a scale of 0 (Does not Interfere) to 10 (Completely Interferes)     1. Which number describes how during the past week pain has interfered with       the following:  A. General Activity:  9  B. Mood: 8  C. Walking Ability:  9  D. Normal Work (Includes both work outside the home and housework):  8  E. Relations with Other People:   9  F. Sleep:   9  G. Enjoyment of Life:   9    2. Patient Prefers to Take their Pain Medications:     [x]  On a regular basis   []  Only when necessary    []  Does not take pain medications    3. What are the Patient's Goals/Expectations for Visiting Pain Management? []  Learn about my pain    [x]  Receive Medication   []  Physical Therapy     []  Treat Depression   []  Receive Injections    []  Treat Sleep   []  Deal with Anxiety and Stress   []  Treat Opoid Dependence/Addiction   []  Other:      HPI:   Steffen Later is a 66 y.o. male is here today for    Chief Complaint: Low back pain     HPI   2 month FU. Patient has pain in low back- axial more bothersome in right side and into right buttocks/ SI area- aching and some intermittent throbbing when up moving or standing \"feels  like a tooth ache\". States overall pain has drastically improved. He did not get his bilateral L-facet MBB completed that was ordered last visit because of Dr. Misbah Price being on [facility]. He feels that he could hold off on procedure at this time d/t improvement and would like to see what the winter weather changes does     Has aching pain in right shoulder and knees. Mood and activity has improved since last visit. States on Zoloft now which is helping from his pcp.      Current pain medications remain effective in

## 2023-10-17 ENCOUNTER — OFFICE VISIT (OUTPATIENT)
Dept: INTERNAL MEDICINE CLINIC | Age: 63
End: 2023-10-17
Payer: MEDICARE

## 2023-10-17 VITALS
DIASTOLIC BLOOD PRESSURE: 72 MMHG | HEART RATE: 68 BPM | HEIGHT: 73 IN | BODY MASS INDEX: 39.34 KG/M2 | SYSTOLIC BLOOD PRESSURE: 128 MMHG | TEMPERATURE: 97.8 F | WEIGHT: 296.8 LBS

## 2023-10-17 DIAGNOSIS — R19.7 DIARRHEA DUE TO MALABSORPTION: ICD-10-CM

## 2023-10-17 DIAGNOSIS — F32.9 MAJOR DEPRESSIVE DISORDER WITH CURRENT ACTIVE EPISODE, UNSPECIFIED DEPRESSION EPISODE SEVERITY, UNSPECIFIED WHETHER RECURRENT: ICD-10-CM

## 2023-10-17 DIAGNOSIS — K21.9 GASTROESOPHAGEAL REFLUX DISEASE WITHOUT ESOPHAGITIS: ICD-10-CM

## 2023-10-17 DIAGNOSIS — K90.9 DIARRHEA DUE TO MALABSORPTION: ICD-10-CM

## 2023-10-17 DIAGNOSIS — K59.00 CONSTIPATION, UNSPECIFIED CONSTIPATION TYPE: ICD-10-CM

## 2023-10-17 PROCEDURE — G8484 FLU IMMUNIZE NO ADMIN: HCPCS

## 2023-10-17 PROCEDURE — G8427 DOCREV CUR MEDS BY ELIG CLIN: HCPCS

## 2023-10-17 PROCEDURE — 3017F COLORECTAL CA SCREEN DOC REV: CPT

## 2023-10-17 PROCEDURE — 4004F PT TOBACCO SCREEN RCVD TLK: CPT

## 2023-10-17 PROCEDURE — G8417 CALC BMI ABV UP PARAM F/U: HCPCS

## 2023-10-17 PROCEDURE — 3078F DIAST BP <80 MM HG: CPT

## 2023-10-17 PROCEDURE — 3074F SYST BP LT 130 MM HG: CPT

## 2023-10-17 PROCEDURE — 99214 OFFICE O/P EST MOD 30 MIN: CPT

## 2023-10-17 RX ORDER — PANTOPRAZOLE SODIUM 20 MG/1
20 TABLET, DELAYED RELEASE ORAL
Qty: 30 TABLET | Refills: 3 | Status: SHIPPED | OUTPATIENT
Start: 2023-10-17

## 2023-10-17 RX ORDER — L. ACIDOPHILUS/L.BULGARICUS 1MM CELL
1 TABLET ORAL DAILY
Qty: 30 TABLET | Refills: 3 | Status: SHIPPED | OUTPATIENT
Start: 2023-10-17 | End: 2024-02-14

## 2023-10-17 ASSESSMENT — ENCOUNTER SYMPTOMS
CONSTIPATION: 1
CHEST TIGHTNESS: 0
COUGH: 0
RESPIRATORY NEGATIVE: 1

## 2023-10-17 NOTE — PROGRESS NOTES
[facility] Medicine   [address] [place] [address] Avenue [address] [facility], [address] [facility],[address]  Dept: [phone]  Dept Fax: [phone]    Nicole Jackson (1960) is a 66 y.o. male Established patient, here for evaluation of the following chief complaint(s):  Chief Complaint   Patient presents with    Hypertension     2 months     Hyperlipidemia    Hypothyroidism    Gastroesophageal Reflux    Depression    Constipation        ASSESSMENT AND PLAN     1. Major depressive disorder with current active episode, unspecified depression episode severity, unspecified whether recurrent  -     sertraline (ZOLOFT) 50 MG tablet; Take 2 tablets by mouth daily, Disp-45 tablet, R-3Normal  2. Diarrhea due to malabsorption  -     Lactobacillus 0.05-0.05 MG TABS; Take 1 tablet by mouth daily, Disp-30 tablet, R-3Normal  3. Constipation, unspecified constipation type  -     Lactobacillus 0.05-0.05 MG TABS; Take 1 tablet by mouth daily, Disp-30 tablet, R-3Normal  4. Gastroesophageal reflux disease without esophagitis  -     pantoprazole (PROTONIX) 20 MG tablet; Take 1 tablet by mouth every morning (before breakfast), Disp-30 tablet, R-3Normal      #MDD: Patient reports hx MDD, had previous SI > 20 years ago. States he currently has no SI, HI, thoughts of harming self. Trialed on Sertraline 50mg daily, reports no improvement though wife states mood has improved. Will increase Zoloft to 100mg daily    #Severe constipation/diarrhea: Patient reports severe diarrhea since colon resection in 2018. Denies significant fevers, chills. Diarrhea improved, continues with mild constipation. Will continue on psyllium, probiotic and monitor    #GERD: Started on PPI 40mg last visit, will decrease to 20mg daily given improvement      Return in about 2 months (around 12/17/2023).  with Guy De La Cruz MD     [facility]     Chief Complaint   Patient presents with    Hypertension     2 months     Hyperlipidemia    Hypothyroidism    Gastroesophageal Reflux

## 2023-10-17 NOTE — PATIENT INSTRUCTIONS
Increase the Zoloft to 100mg daily  Decrease the pantoprazole to 20mg daily - script sent  Start probiotic

## 2023-10-24 ENCOUNTER — OFFICE VISIT (OUTPATIENT)
Dept: INTERNAL MEDICINE CLINIC | Age: 63
End: 2023-10-24

## 2023-10-24 VITALS
OXYGEN SATURATION: 98 % | DIASTOLIC BLOOD PRESSURE: 66 MMHG | HEART RATE: 60 BPM | SYSTOLIC BLOOD PRESSURE: 120 MMHG | HEIGHT: 73 IN | WEIGHT: 293 LBS | BODY MASS INDEX: 38.83 KG/M2

## 2023-10-24 DIAGNOSIS — Z00.00 INITIAL MEDICARE ANNUAL WELLNESS VISIT: Primary | ICD-10-CM

## 2023-10-24 ASSESSMENT — COLUMBIA-SUICIDE SEVERITY RATING SCALE - C-SSRS
2. HAVE YOU ACTUALLY HAD ANY THOUGHTS OF KILLING YOURSELF?: NO
7. DID THIS OCCUR IN THE LAST THREE MONTHS: NO
5. HAVE YOU STARTED TO WORK OUT OR WORKED OUT THE DETAILS OF HOW TO KILL YOURSELF? DO YOU INTEND TO CARRY OUT THIS PLAN?: NO
3. HAVE YOU BEEN THINKING ABOUT HOW YOU MIGHT KILL YOURSELF?: NO
BASED ON RESPONSES TO C-SSRS QS 1-6, WHAT IS THE PATIENT'S OVERALL RISK RATING FOR SUICIDE: NO RISK
1. WITHIN THE PAST MONTH, HAVE YOU WISHED YOU WERE DEAD OR WISHED YOU COULD GO TO SLEEP AND NOT WAKE UP?: NO
6. HAVE YOU EVER DONE ANYTHING, STARTED TO DO ANYTHING, OR PREPARED TO DO ANYTHING TO END YOUR LIFE?: NO
4. HAVE YOU HAD THESE THOUGHTS AND HAD SOME INTENTION OF ACTING ON THEM?: NO

## 2023-10-24 ASSESSMENT — PATIENT HEALTH QUESTIONNAIRE - PHQ9
7. TROUBLE CONCENTRATING ON THINGS, SUCH AS READING THE NEWSPAPER OR WATCHING TELEVISION: 3
9. THOUGHTS THAT YOU WOULD BE BETTER OFF DEAD, OR OF HURTING YOURSELF: 0
SUM OF ALL RESPONSES TO PHQ QUESTIONS 1-9: 22
6. FEELING BAD ABOUT YOURSELF - OR THAT YOU ARE A FAILURE OR HAVE LET YOURSELF OR YOUR FAMILY DOWN: 3
2. FEELING DOWN, DEPRESSED OR HOPELESS: 2
SUM OF ALL RESPONSES TO PHQ9 QUESTIONS 1 & 2: 5
1. LITTLE INTEREST OR PLEASURE IN DOING THINGS: 3
SUM OF ALL RESPONSES TO PHQ QUESTIONS 1-9: 22
4. FEELING TIRED OR HAVING LITTLE ENERGY: 3
8. MOVING OR SPEAKING SO SLOWLY THAT OTHER PEOPLE COULD HAVE NOTICED. OR THE OPPOSITE, BEING SO FIGETY OR RESTLESS THAT YOU HAVE BEEN MOVING AROUND A LOT MORE THAN USUAL: 2
SUM OF ALL RESPONSES TO PHQ QUESTIONS 1-9: 22
5. POOR APPETITE OR OVEREATING: 3
SUM OF ALL RESPONSES TO PHQ QUESTIONS 1-9: 22
10. IF YOU CHECKED OFF ANY PROBLEMS, HOW DIFFICULT HAVE THESE PROBLEMS MADE IT FOR YOU TO DO YOUR WORK, TAKE CARE OF THINGS AT HOME, OR GET ALONG WITH OTHER PEOPLE: 2
3. TROUBLE FALLING OR STAYING ASLEEP: 3

## 2023-10-24 ASSESSMENT — LIFESTYLE VARIABLES
HOW MANY STANDARD DRINKS CONTAINING ALCOHOL DO YOU HAVE ON A TYPICAL DAY: PATIENT DOES NOT DRINK
HOW OFTEN DO YOU HAVE A DRINK CONTAINING ALCOHOL: NEVER

## 2023-10-24 NOTE — PROGRESS NOTES
Error
10 MG tablet Take 1 tablet by mouth daily  Patient not taking: No sig reported  Srinivas Vargas,    glimepiride (AMARYL) 4 MG tablet Take 4 mg by mouth daily   Provider, MD James Cadena (Including outside providers/suppliers regularly involved in providing care):   Patient Care Team:  Michelle Zhao MD as PCP - General (Internal Medicine)  Alondra Da Silva MD as Cardiologist (Cardiology)  SAHIL Gomez CNP as Nurse Practitioner (Nurse Practitioner)  SAHIL Florez CNP (Nurse Practitioner)  Tila Castro MD as Consulting Physician (Urology)     Reviewed and updated this visit:  Allergies  Meds  Problems  Med Hx  Surg Hx  Soc Hx  Fam Hx        Staff.  Dr. Fermin Ríos

## 2023-10-24 NOTE — PATIENT INSTRUCTIONS
[facility] disclaims any warranty or liability for your use of this information. Learning About Activities of Daily Living  What are activities of daily living? Activities of daily living (ADLs) are the basic self-care tasks you do every day. As you age, and if you have health problems, you may find that it's harder to do these things for yourself. That's when you may need some help. Your doctor uses ADLs to measure how much help you need. Knowing what you can and can't do for yourself is an important first step to getting help. And when you have the help you need, you can stay as independent as possible. Your doctor will want to know if you are able to do tasks such as: Take a bath or shower without help. Go to the bathroom by yourself. Dress and undress without help. Shave, comb your hair, and brush teeth on your own. Get in and out of bed or a chair without help. Feed yourself without help. If you are having trouble doing basic self-care tasks, talk with your doctor. You may want to bring a caregiver or family member who can help the doctor understand your needs and abilities. How will a doctor assess your ADLs? Asking about ADLs is part of a routine health checkup your doctor will likely do as you age. Your health check might be done in a doctor's office, in your home, or at a hospital. The goal is to find out if you are having any problems that could make your health problems worse or that make it unsafe for you to be on your own. To measure your ADLs, your doctor will ask how hard it is for you to do routine tasks. He or she may also want to know if you have changed the way you do a task because of a health problem. He or she may watch how you:  Walk back and forth. Keep your balance while you stand or walk. Move from sitting to standing or from a bed to a chair. Button or unbutton a shirt or sweater. Remove and put on your shoes.   It's normal to feel a little worried or anxious

## 2023-11-02 DIAGNOSIS — G89.4 CHRONIC PAIN SYNDROME: ICD-10-CM

## 2023-11-02 RX ORDER — OXYCODONE AND ACETAMINOPHEN 10; 325 MG/1; MG/1
1 TABLET ORAL EVERY 8 HOURS PRN
Qty: 90 TABLET | Refills: 0 | Status: SHIPPED | OUTPATIENT
Start: 2023-11-02 | End: 2023-12-02

## 2023-11-02 NOTE — TELEPHONE ENCOUNTER
OARRS reviewed. UDS: + for  gabapentin and hydrocodone.   Last seen: 10/3/2023. Follow-up: 12/05/2023

## 2023-11-10 ENCOUNTER — OFFICE VISIT (OUTPATIENT)
Dept: PULMONOLOGY | Age: 63
End: 2023-11-10
Payer: MEDICARE

## 2023-11-10 VITALS
HEIGHT: 73 IN | HEART RATE: 68 BPM | OXYGEN SATURATION: 95 % | DIASTOLIC BLOOD PRESSURE: 70 MMHG | BODY MASS INDEX: 38.94 KG/M2 | SYSTOLIC BLOOD PRESSURE: 122 MMHG | WEIGHT: 293.8 LBS | TEMPERATURE: 97.8 F

## 2023-11-10 DIAGNOSIS — G47.33 OSA (OBSTRUCTIVE SLEEP APNEA): Primary | ICD-10-CM

## 2023-11-10 DIAGNOSIS — G47.09 OTHER INSOMNIA: ICD-10-CM

## 2023-11-10 DIAGNOSIS — I50.32 CHRONIC DIASTOLIC HEART FAILURE (HCC): ICD-10-CM

## 2023-11-10 DIAGNOSIS — G25.81 RLS (RESTLESS LEGS SYNDROME): ICD-10-CM

## 2023-11-10 PROCEDURE — G8417 CALC BMI ABV UP PARAM F/U: HCPCS

## 2023-11-10 PROCEDURE — 4004F PT TOBACCO SCREEN RCVD TLK: CPT

## 2023-11-10 PROCEDURE — G8427 DOCREV CUR MEDS BY ELIG CLIN: HCPCS

## 2023-11-10 PROCEDURE — G8484 FLU IMMUNIZE NO ADMIN: HCPCS

## 2023-11-10 PROCEDURE — 3017F COLORECTAL CA SCREEN DOC REV: CPT

## 2023-11-10 PROCEDURE — 3078F DIAST BP <80 MM HG: CPT

## 2023-11-10 PROCEDURE — 3074F SYST BP LT 130 MM HG: CPT

## 2023-11-10 PROCEDURE — 99214 OFFICE O/P EST MOD 30 MIN: CPT

## 2023-11-10 RX ORDER — LANOLIN ALCOHOL/MO/W.PET/CERES
6 CREAM (GRAM) TOPICAL NIGHTLY PRN
Qty: 60 TABLET | Refills: 11 | Status: SHIPPED | OUTPATIENT
Start: 2023-11-10

## 2023-11-16 ENCOUNTER — PATIENT MESSAGE (OUTPATIENT)
Dept: INTERNAL MEDICINE CLINIC | Age: 63
End: 2023-11-16

## 2023-11-16 ENCOUNTER — HOSPITAL ENCOUNTER (OUTPATIENT)
Dept: INTERVENTIONAL RADIOLOGY/VASCULAR | Age: 63
Discharge: HOME | End: 2023-11-16
Payer: MEDICARE

## 2023-11-16 VITALS
SYSTOLIC BLOOD PRESSURE: 165 MMHG | HEART RATE: 60 BPM | BODY MASS INDEX: 37.64 KG/M2 | DIASTOLIC BLOOD PRESSURE: 89 MMHG | RESPIRATION RATE: 18 BRPM | WEIGHT: 285.27 LBS | OXYGEN SATURATION: 97 % | TEMPERATURE: 98 F

## 2023-11-16 DIAGNOSIS — Z99.2 DEPENDENCE ON RENAL DIALYSIS (HCC): ICD-10-CM

## 2023-11-16 DIAGNOSIS — N18.6 END STAGE RENAL DISEASE (HCC): ICD-10-CM

## 2023-11-16 PROCEDURE — 2580000003 HC RX 258: Performed by: RADIOLOGY

## 2023-11-16 PROCEDURE — 6360000002 HC RX W HCPCS: Performed by: RADIOLOGY

## 2023-11-16 PROCEDURE — 2500000003 HC RX 250 WO HCPCS: Performed by: RADIOLOGY

## 2023-11-16 PROCEDURE — 6360000004 HC RX CONTRAST MEDICATION: Performed by: RADIOLOGY

## 2023-11-16 PROCEDURE — 2709999900 IR ANGIOGRAM ARTERIOVENOUS SHUNT

## 2023-11-16 PROCEDURE — 6370000000 HC RX 637 (ALT 250 FOR IP): Performed by: RADIOLOGY

## 2023-11-16 PROCEDURE — 36902 INTRO CATH DIALYSIS CIRCUIT: CPT

## 2023-11-16 RX ORDER — HEPARIN SODIUM 1000 [USP'U]/ML
INJECTION, SOLUTION INTRAVENOUS; SUBCUTANEOUS PRN
Status: COMPLETED | OUTPATIENT
Start: 2023-11-16 | End: 2023-11-16

## 2023-11-16 RX ORDER — LIDOCAINE 40 MG/G
CREAM TOPICAL ONCE
Status: COMPLETED | OUTPATIENT
Start: 2023-11-16 | End: 2023-11-16

## 2023-11-16 RX ORDER — MIDAZOLAM HYDROCHLORIDE 1 MG/ML
1 INJECTION INTRAMUSCULAR; INTRAVENOUS ONCE
Status: COMPLETED | OUTPATIENT
Start: 2023-11-16 | End: 2023-11-16

## 2023-11-16 RX ORDER — MIDAZOLAM HYDROCHLORIDE 1 MG/ML
INJECTION INTRAMUSCULAR; INTRAVENOUS PRN
Status: COMPLETED | OUTPATIENT
Start: 2023-11-16 | End: 2023-11-16

## 2023-11-16 RX ORDER — SODIUM CHLORIDE 450 MG/100ML
INJECTION, SOLUTION INTRAVENOUS CONTINUOUS
Status: DISCONTINUED | OUTPATIENT
Start: 2023-11-16 | End: 2023-11-17 | Stop reason: HOSPADM

## 2023-11-16 RX ADMIN — MIDAZOLAM HYDROCHLORIDE 1 MG: 1 INJECTION, SOLUTION INTRAMUSCULAR; INTRAVENOUS at 08:09

## 2023-11-16 RX ADMIN — MIDAZOLAM 1 MG: 1 INJECTION INTRAMUSCULAR; INTRAVENOUS at 08:17

## 2023-11-16 RX ADMIN — HYDROMORPHONE HYDROCHLORIDE 0.5 MG: 1 INJECTION, SOLUTION INTRAMUSCULAR; INTRAVENOUS; SUBCUTANEOUS at 08:32

## 2023-11-16 RX ADMIN — IOPAMIDOL 75 ML: 612 INJECTION, SOLUTION INTRAVENOUS at 08:50

## 2023-11-16 RX ADMIN — HYDROMORPHONE HYDROCHLORIDE 0.5 MG: 1 INJECTION, SOLUTION INTRAMUSCULAR; INTRAVENOUS; SUBCUTANEOUS at 08:28

## 2023-11-16 RX ADMIN — LIDOCAINE: 40 CREAM TOPICAL at 07:14

## 2023-11-16 RX ADMIN — HYDROMORPHONE HYDROCHLORIDE 1 MG: 1 INJECTION, SOLUTION INTRAMUSCULAR; INTRAVENOUS; SUBCUTANEOUS at 08:17

## 2023-11-16 RX ADMIN — HEPARIN SODIUM 2000 UNITS: 1000 INJECTION INTRAVENOUS; SUBCUTANEOUS at 08:21

## 2023-11-16 RX ADMIN — Medication 3000 MG: at 07:13

## 2023-11-16 RX ADMIN — SODIUM CHLORIDE: 4.5 INJECTION, SOLUTION INTRAVENOUS at 07:13

## 2023-11-16 RX ADMIN — HYDROMORPHONE HYDROCHLORIDE 1 MG: 1 INJECTION, SOLUTION INTRAMUSCULAR; INTRAVENOUS; SUBCUTANEOUS at 08:09

## 2023-11-16 ASSESSMENT — PAIN SCALES - GENERAL: PAINLEVEL_OUTOF10: 6

## 2023-11-16 ASSESSMENT — PAIN - FUNCTIONAL ASSESSMENT: PAIN_FUNCTIONAL_ASSESSMENT: 0-10

## 2023-11-16 NOTE — PROGRESS NOTES
Pharmacy Pre-Op Antibiotic Dose Adjustment    Radha Alcantar is a 66 y.o. male scheduled for surgery. Pharmacy will adjust the following pre-op antibiotic per P&T approved policy: cefazolin    Weight:  Wt Readings from Last 1 Encounters:   11/10/23 133.3 kg (293 lb 12.8 oz)     There is no height or weight on file to calculate BMI.     Plan:   Pre-op antibiotic adjustment: increase cefazolin from 2 g to 3 g

## 2023-11-16 NOTE — PROGRESS NOTES
0746 Pt in specials radiology for dialysis fistulogram with possible intervention. Discussed procedure with pt and pt verbalizes understanding. [address] to table and attached to monitor. 4125 Dr Roselia Jeffers to scan left arm fistula with US>  0810 Left upper arm prepped and draped. 5726 Heparin verified with Dr Roselia Jeffers. 3112 Angioplasty of arterial anastamosis with 6 x 40 IN. PACT AV balloon. 1716 Angioplasty of run-off vein with 6 x 80 IN. PACT AV balloon. 2777 Angioplasty of run-off vein with 8 x 60 [place] 35 balloon. 3159 Procedure complete. Prepping for sheath removal.  3552 Slip-not inserted as sheaths x 2 removed. Manual pressure applied per J Daiana RT. NO bleeding noted. 0900 Manual pressure removed. No bleeding noted. Op-site dressings applied. 4424 Report called to [facility].  3062 Pt positioned on cart for comfort. Transferred to [facility] per cart.

## 2023-11-16 NOTE — PROGRESS NOTES
__M__ Safety:       (Environmental)  [place] to environment  Ensure ID band is correct and in place/ allergy band as needed  Assess for fall risk  Initiate fall precautions as applicable (fall band, side rails, etc.)  Call light within reach  Bed in low position/ wheels locked    _M___ Pain:       Assess pain level and characteristics  Administer analgesics as ordered  Assess effectiveness of pain management and report to MD as needed    __M__ Knowledge Deficit:  Assess baseline knowledge  Provide teaching at level of understanding  Provide teaching via preferred learning method  Evaluate teaching effectiveness    _M___ Hemodynamic/Respiratory Status:       (Pre and Post Procedure Monitoring)  Assess/Monitor vital signs and LOC  Assess Baseline SpO2 prior to any sedation  Obtain weight/height  Assess vital signs/ LOC until patient meets discharge criteria  Monitor procedure site and notify MD of any issues

## 2023-11-16 NOTE — OP NOTE
Department of Radiology  Post Procedure Progress Note      Pre-Procedure Diagnosis: Malfunctioning dialysis fistula/graft     Procedure Performed:  Dialysis fistulagram with angioplasty     Anesthesia: local / versed and dilaudid    Findings: successful    Immediate Complications:  None    Estimated Blood Loss: minimal    SEE DICTATED PROCEDURE NOTE FOR COMPLETE DETAILS.     Caprice Young MD   11/16/2023 8:58 AM

## 2023-11-16 NOTE — H&P
Formulation and discussion of sedation / procedure plans, risks, benefits, side effects and alternatives with patient and/or responsible adult completed. History and Physical reviewed and unchanged.     Electronically signed by Kathryn Ferrara MD on 11/16/23 at 8:09 AM EST

## 2023-11-16 NOTE — PROGRESS NOTES
Patient in recovery, vitals are stable. He denies pain at fistula site. Fistula site dressing is dry and intact.

## 2023-11-16 NOTE — H&P
[facility]  Sedation/Analgesia History & Physical    Pt Name: Nicole Jackson  MRN: [id number]  YOB: 1960  Provider Performing Procedure: Carl Lozano MD, MD  Primary Care Physician: Ricky Thomas MD    Formulation and discussion of sedation / procedure plans, risks, benefits, side effects and alternatives with patient and/or responsible adult completed. PRE-PROCEDURE   DNR-CCA/DNR-CC []Yes [x]No  Brief History/Pre-Procedure Diagnosis: Malfunctioning dialysis fistula/graft           MEDICAL HISTORY  []CAD/Valve  []Liver Disease  []Lung Disease []Diabetes  []Hypertension []Renal Disease  []Additional information:       has a past medical history of Arthritis, Back problem, Bladder disease, CHF with unknown LVEF ([address]), Chronic kidney disease, Constipation, History of diabetes mellitus, Hypertension, Hypertensive emergency, Hypertensive urgency, Sleep apnea, and Thyroid disease. SURGICAL HISTORY   has a past surgical history that includes Appendectomy; knee surgery (Right, 1980's); Carpal tunnel release (Bilateral); Colonoscopy (2017); hernia repair; pr exploratory laparotomy celiotomy w/wo biopsy spx (N/A, 2/5/2018); Dilatation, esophagus; Abdomen surgery; Cardiac catheterization; Abdomen surgery (N/A, 3/25/2022); Dialysis fistula creation (Left, 12/27/2022); Circumcision (N/A, 3/2/2023); Cystoscopy (N/A, 3/2/2023); Colonoscopy (N/A, 7/7/2023); and Upper gastrointestinal endoscopy (N/A, 7/7/2023).   Additional information:       ALLERGIES   Allergies as of 11/16/2023 - Fully Reviewed 11/16/2023   Allergen Reaction Noted    Azithromycin Itching 10/26/2022    Shellfish-derived products Swelling 04/23/2013    Pcn [penicillins] Itching 04/23/2013    Succinylcholine  02/05/2018    Sulfa antibiotics Itching 08/31/2021    Morphine Nausea And Vomiting 01/29/2018    Tape [adhesive tape] Rash 01/29/2018     Additional information:       MEDICATIONS   Coumadin Use Last 5 Days [x]No

## 2023-11-16 NOTE — TELEPHONE ENCOUNTER
From: Kyle Stacy  To:  Jessica Malin MD  Sent: 11/16/2023 12:31 PM EST  Subject: Carvediol    Dr Desean Lopez could I please get a refill for my carvediol 25 mg taken twice a day thank you Donald Coleman

## 2023-11-16 NOTE — DISCHARGE INSTRUCTIONS
FISTULAGRAM DISCHARGE CARE    Diet:  As before    Care of catheter:  Keep dressing clean and dry  Limit activity to shoulder/arm (no pushing, pulling, or lifting) until okayed by dialysis. No shower or tub baths until dressing is changed by dialysis nurses    Return to nearest Emergency Room if any of the following:  Symptoms of bleeding, drainage, swelling  Increase in pain  Elevated temperature over 101 degrees    Keep scheduled appointment in dialysis. If you have any problems, call your doctor or return to nearest Emergency Room.

## 2023-11-17 RX ORDER — CARVEDILOL 25 MG/1
25 TABLET ORAL 2 TIMES DAILY
Qty: 60 TABLET | Refills: 5 | Status: SHIPPED | OUTPATIENT
Start: 2023-11-17

## 2023-11-21 ENCOUNTER — OFFICE VISIT (OUTPATIENT)
Dept: UROLOGY | Age: 63
End: 2023-11-21
Payer: MEDICARE

## 2023-11-21 VITALS — RESPIRATION RATE: 18 BRPM | WEIGHT: 285 LBS | BODY MASS INDEX: 37.77 KG/M2 | HEIGHT: 73 IN

## 2023-11-21 DIAGNOSIS — R97.20 ELEVATED PSA: ICD-10-CM

## 2023-11-21 DIAGNOSIS — R31.0 GROSS HEMATURIA: ICD-10-CM

## 2023-11-21 DIAGNOSIS — N47.1 PHIMOSIS: ICD-10-CM

## 2023-11-21 DIAGNOSIS — N40.1 BENIGN PROSTATIC HYPERPLASIA WITH URINARY FREQUENCY: Primary | ICD-10-CM

## 2023-11-21 DIAGNOSIS — R35.0 BENIGN PROSTATIC HYPERPLASIA WITH URINARY FREQUENCY: Primary | ICD-10-CM

## 2023-11-21 LAB — POST VOID RESIDUAL (PVR): 67 ML

## 2023-11-21 PROCEDURE — 51798 US URINE CAPACITY MEASURE: CPT | Performed by: NURSE PRACTITIONER

## 2023-11-21 PROCEDURE — 3017F COLORECTAL CA SCREEN DOC REV: CPT | Performed by: NURSE PRACTITIONER

## 2023-11-21 PROCEDURE — 99214 OFFICE O/P EST MOD 30 MIN: CPT | Performed by: NURSE PRACTITIONER

## 2023-11-21 PROCEDURE — G8484 FLU IMMUNIZE NO ADMIN: HCPCS | Performed by: NURSE PRACTITIONER

## 2023-11-21 PROCEDURE — 4004F PT TOBACCO SCREEN RCVD TLK: CPT | Performed by: NURSE PRACTITIONER

## 2023-11-21 PROCEDURE — G8417 CALC BMI ABV UP PARAM F/U: HCPCS | Performed by: NURSE PRACTITIONER

## 2023-11-21 PROCEDURE — G8427 DOCREV CUR MEDS BY ELIG CLIN: HCPCS | Performed by: NURSE PRACTITIONER

## 2023-11-21 RX ORDER — TAMSULOSIN HYDROCHLORIDE 0.4 MG/1
0.4 CAPSULE ORAL DAILY
Qty: 90 CAPSULE | Refills: 3 | Status: SHIPPED | OUTPATIENT
Start: 2023-11-21 | End: 2024-02-19

## 2023-12-05 ENCOUNTER — OFFICE VISIT (OUTPATIENT)
Dept: PHYSICAL MEDICINE AND REHAB | Age: 63
End: 2023-12-05
Payer: MEDICARE

## 2023-12-05 VITALS — SYSTOLIC BLOOD PRESSURE: 102 MMHG | DIASTOLIC BLOOD PRESSURE: 60 MMHG | OXYGEN SATURATION: 93 % | HEART RATE: 74 BPM

## 2023-12-05 DIAGNOSIS — M25.562 CHRONIC PAIN OF BOTH KNEES: ICD-10-CM

## 2023-12-05 DIAGNOSIS — G62.9 NEUROPATHY: ICD-10-CM

## 2023-12-05 DIAGNOSIS — M48.062 SPINAL STENOSIS OF LUMBAR REGION WITH NEUROGENIC CLAUDICATION: ICD-10-CM

## 2023-12-05 DIAGNOSIS — G89.29 CHRONIC PAIN OF BOTH KNEES: ICD-10-CM

## 2023-12-05 DIAGNOSIS — G89.4 CHRONIC PAIN SYNDROME: ICD-10-CM

## 2023-12-05 DIAGNOSIS — M47.816 SPONDYLOSIS OF LUMBAR REGION WITHOUT MYELOPATHY OR RADICULOPATHY: Primary | ICD-10-CM

## 2023-12-05 DIAGNOSIS — M47.816 LUMBAR FACET ARTHROPATHY: ICD-10-CM

## 2023-12-05 DIAGNOSIS — M25.561 CHRONIC PAIN OF BOTH KNEES: ICD-10-CM

## 2023-12-05 DIAGNOSIS — M17.0 PRIMARY OSTEOARTHRITIS OF BOTH KNEES: ICD-10-CM

## 2023-12-05 PROCEDURE — G8484 FLU IMMUNIZE NO ADMIN: HCPCS | Performed by: NURSE PRACTITIONER

## 2023-12-05 PROCEDURE — 3074F SYST BP LT 130 MM HG: CPT | Performed by: NURSE PRACTITIONER

## 2023-12-05 PROCEDURE — 4004F PT TOBACCO SCREEN RCVD TLK: CPT | Performed by: NURSE PRACTITIONER

## 2023-12-05 PROCEDURE — G8427 DOCREV CUR MEDS BY ELIG CLIN: HCPCS | Performed by: NURSE PRACTITIONER

## 2023-12-05 PROCEDURE — 99214 OFFICE O/P EST MOD 30 MIN: CPT | Performed by: NURSE PRACTITIONER

## 2023-12-05 PROCEDURE — 3017F COLORECTAL CA SCREEN DOC REV: CPT | Performed by: NURSE PRACTITIONER

## 2023-12-05 PROCEDURE — 3078F DIAST BP <80 MM HG: CPT | Performed by: NURSE PRACTITIONER

## 2023-12-05 PROCEDURE — G8417 CALC BMI ABV UP PARAM F/U: HCPCS | Performed by: NURSE PRACTITIONER

## 2023-12-05 RX ORDER — OXYCODONE AND ACETAMINOPHEN 10; 325 MG/1; MG/1
1 TABLET ORAL EVERY 8 HOURS PRN
Qty: 90 TABLET | Refills: 0 | Status: SHIPPED | OUTPATIENT
Start: 2023-12-05 | End: 2024-01-04

## 2023-12-05 RX ORDER — GABAPENTIN 300 MG/1
600 CAPSULE ORAL NIGHTLY
Qty: 60 CAPSULE | Refills: 2 | Status: SHIPPED | OUTPATIENT
Start: 2023-12-05 | End: 2024-03-04

## 2023-12-05 ASSESSMENT — ENCOUNTER SYMPTOMS
STRIDOR: 0
GASTROINTESTINAL NEGATIVE: 1
CONSTIPATION: 0
BACK PAIN: 1
CHOKING: 0
SHORTNESS OF BREATH: 0
COUGH: 0
ABDOMINAL PAIN: 0
APNEA: 1
CHEST TIGHTNESS: 0
DIARRHEA: 0
WHEEZING: 0

## 2023-12-05 NOTE — PROGRESS NOTES
Functionality Assessment/Goals Worksheet     On a scale of 0 (Does not Interfere) to 10 (Completely Interferes)     1. Which number describes how during the past week pain has interfered with           the following:  A. General Activity:  8  B. Mood: 8  C. Walking Ability:  8  D. Normal Work (Includes both work outside the home and housework):  9  E. Relations with Other People:   7  F. Sleep:   8  G. Enjoyment of Life:   10    2. Patient Prefers to Take their Pain Medications:     [x]  On a regular basis   []  Only when necessary    []  Does not take pain medications    3. What are the Patient's Goals/Expectations for Visiting Pain Management? []  Learn about my pain    [x]  Receive Medication   []  Physical Therapy     []  Treat Depression   []  Receive Injections    []  Treat Sleep   []  Deal with Anxiety and Stress   []  Treat Opoid Dependence/Addiction   []  Other:         HPI:   Jensen Lai is a 66 y.o. male is here today for    Chief Complaint: Low back pain     HPI   2 month FU. Continues to have a main compliant of low back pain- [place] aching and throbbing pain. Pain is all axial. Has ups and downs depending on activity. Continues to have joint pains aching in knees, ankles   Current pain medications remain effective in decreasing pain to a tolerable level     Neurontin is helping neuropathy in feet     Continues dialysis 4 days per week. Has lost a lot of weight   Pain increases with bending, lifting, twisting , walking, standing, getting up and down, and housework or working at job. Medications reviewed. Patient denies side effects with medications. Patient states he is taking medications as prescribed. Hedenies receiving pain medications from other sources. He denies any ER visits since last visit. Pain scale with out pain medications or at its worst is 8-9/10. Pain scale with pain medications or at its best is 6/10.   Last dose of Percocet and Neurontin was  last night

## 2023-12-19 ENCOUNTER — OFFICE VISIT (OUTPATIENT)
Dept: INTERNAL MEDICINE CLINIC | Age: 63
End: 2023-12-19
Payer: MEDICARE

## 2023-12-19 VITALS
DIASTOLIC BLOOD PRESSURE: 64 MMHG | BODY MASS INDEX: 39.94 KG/M2 | WEIGHT: 301.4 LBS | TEMPERATURE: 97.5 F | HEART RATE: 68 BPM | HEIGHT: 73 IN | SYSTOLIC BLOOD PRESSURE: 120 MMHG

## 2023-12-19 DIAGNOSIS — R19.7 DIARRHEA DUE TO MALABSORPTION: ICD-10-CM

## 2023-12-19 DIAGNOSIS — E03.9 HYPOTHYROIDISM, UNSPECIFIED TYPE: ICD-10-CM

## 2023-12-19 DIAGNOSIS — G25.81 RLS (RESTLESS LEGS SYNDROME): ICD-10-CM

## 2023-12-19 DIAGNOSIS — K90.9 DIARRHEA DUE TO MALABSORPTION: ICD-10-CM

## 2023-12-19 DIAGNOSIS — I50.43 CHF (CONGESTIVE HEART FAILURE), NYHA CLASS III, ACUTE ON CHRONIC, COMBINED (HCC): ICD-10-CM

## 2023-12-19 DIAGNOSIS — K59.00 CONSTIPATION, UNSPECIFIED CONSTIPATION TYPE: ICD-10-CM

## 2023-12-19 DIAGNOSIS — F32.9 MAJOR DEPRESSIVE DISORDER WITH CURRENT ACTIVE EPISODE, UNSPECIFIED DEPRESSION EPISODE SEVERITY, UNSPECIFIED WHETHER RECURRENT: Primary | ICD-10-CM

## 2023-12-19 PROCEDURE — 3017F COLORECTAL CA SCREEN DOC REV: CPT

## 2023-12-19 PROCEDURE — 3074F SYST BP LT 130 MM HG: CPT

## 2023-12-19 PROCEDURE — G8427 DOCREV CUR MEDS BY ELIG CLIN: HCPCS

## 2023-12-19 PROCEDURE — G8484 FLU IMMUNIZE NO ADMIN: HCPCS

## 2023-12-19 PROCEDURE — 99213 OFFICE O/P EST LOW 20 MIN: CPT

## 2023-12-19 PROCEDURE — 3078F DIAST BP <80 MM HG: CPT

## 2023-12-19 PROCEDURE — G8417 CALC BMI ABV UP PARAM F/U: HCPCS

## 2023-12-19 PROCEDURE — 4004F PT TOBACCO SCREEN RCVD TLK: CPT

## 2023-12-19 RX ORDER — LEVOTHYROXINE SODIUM 175 UG/1
175 TABLET ORAL DAILY
Qty: 90 TABLET | Refills: 1 | Status: SHIPPED | OUTPATIENT
Start: 2023-12-19

## 2023-12-19 RX ORDER — CARVEDILOL 25 MG/1
25 TABLET ORAL 2 TIMES DAILY
Qty: 180 TABLET | Refills: 1 | Status: SHIPPED | OUTPATIENT
Start: 2023-12-19

## 2023-12-19 RX ORDER — L. ACIDOPHILUS/L.BULGARICUS 1MM CELL
1 TABLET ORAL DAILY
Qty: 30 TABLET | Refills: 5 | Status: SHIPPED | OUTPATIENT
Start: 2023-12-19 | End: 2024-06-16

## 2023-12-19 RX ORDER — PRIMIDONE 50 MG/1
50 TABLET ORAL 2 TIMES DAILY
Qty: 180 TABLET | Refills: 1 | Status: SHIPPED | OUTPATIENT
Start: 2023-12-19

## 2023-12-19 RX ORDER — MONTELUKAST SODIUM 10 MG/1
10 TABLET ORAL NIGHTLY
Qty: 90 TABLET | Refills: 1 | Status: SHIPPED | OUTPATIENT
Start: 2023-12-19

## 2023-12-19 NOTE — PROGRESS NOTES
[facility] Internal Medicine   [address] [place] [address] Avenue [address] [facility], [address] [facility],[address]  Dept: [phone]  Dept Fax: [phone]    Marbin Hutchison (1960) is a 66 y.o. male Established patient, here for evaluation of the following chief complaint(s):  Chief Complaint   Patient presents with    Depression     2 months     Hypothyroidism    Sleep Apnea    Gastroesophageal Reflux        ASSESSMENT AND PLAN     1. Hypothyroidism, unspecified type  -     levothyroxine (SYNTHROID) 175 MCG tablet; Take 1 tablet by mouth Daily, Disp-90 tablet, R-1Normal  2. Diarrhea due to malabsorption  -     Lactobacillus 0.05-0.05 MG TABS; Take 1 tablet by mouth daily, Disp-30 tablet, R-5Normal  3. Constipation, unspecified constipation type  -     Lactobacillus 0.05-0.05 MG TABS; Take 1 tablet by mouth daily, Disp-30 tablet, R-5Normal  4. Major depressive disorder with current active episode, unspecified depression episode severity, unspecified whether recurrent  -     sertraline (ZOLOFT) 50 MG tablet; Take 3 tablets by mouth daily, Disp-90 tablet, R-3Normal  5. CHF (congestive heart failure), NYHA class III, acute on chronic, combined (HCC)  -     carvedilol (COREG) 25 MG tablet; Take 1 tablet by mouth 2 times daily, Disp-180 tablet, R-1Normal  6. RLS (restless legs syndrome)  -     primidone (MYSOLINE) 50 MG tablet; Take 1 tablet by mouth in the morning and at bedtime, Disp-180 tablet, R-1Normal      #MDD/Anxiety: Patient reports hx MDD, had previous SI > 20 years ago. States he currently has no SI, HI, thoughts of harming self. Trialed on Sertraline 50mg daily, increased to 100mg daily. Mood improved per wife. Mood remains an issue with patient, he states he willing to trial an increased dose and monitor for side effects  -Increased Zoloft to 150 mg daily  -Follow-up in 3 months       Return in about 3 months (around 3/19/2024).  with Anna Montaño MD     [facility]     Chief Complaint   Patient presents with    Depression

## 2023-12-30 DIAGNOSIS — F32.9 MAJOR DEPRESSIVE DISORDER WITH CURRENT ACTIVE EPISODE, UNSPECIFIED DEPRESSION EPISODE SEVERITY, UNSPECIFIED WHETHER RECURRENT: ICD-10-CM

## 2023-12-30 PROBLEM — E66.9 OBESITY (BMI 30-39.9): Status: ACTIVE | Noted: 2023-12-30

## 2023-12-30 PROBLEM — T81.89XA NONHEALING SURGICAL WOUND: Status: RESOLVED | Noted: 2021-09-22 | Resolved: 2023-12-30

## 2024-01-08 DIAGNOSIS — M47.816 SPONDYLOSIS OF LUMBAR REGION WITHOUT MYELOPATHY OR RADICULOPATHY: ICD-10-CM

## 2024-01-08 DIAGNOSIS — G89.4 CHRONIC PAIN SYNDROME: ICD-10-CM

## 2024-01-08 DIAGNOSIS — M48.062 SPINAL STENOSIS OF LUMBAR REGION WITH NEUROGENIC CLAUDICATION: ICD-10-CM

## 2024-01-08 DIAGNOSIS — M25.561 CHRONIC PAIN OF BOTH KNEES: ICD-10-CM

## 2024-01-08 DIAGNOSIS — G62.9 NEUROPATHY: ICD-10-CM

## 2024-01-08 DIAGNOSIS — M17.0 PRIMARY OSTEOARTHRITIS OF BOTH KNEES: ICD-10-CM

## 2024-01-08 DIAGNOSIS — M25.562 CHRONIC PAIN OF BOTH KNEES: ICD-10-CM

## 2024-01-08 DIAGNOSIS — M47.816 LUMBAR FACET ARTHROPATHY: ICD-10-CM

## 2024-01-08 DIAGNOSIS — G89.29 CHRONIC PAIN OF BOTH KNEES: ICD-10-CM

## 2024-01-08 RX ORDER — OXYCODONE AND ACETAMINOPHEN 10; 325 MG/1; MG/1
1 TABLET ORAL EVERY 8 HOURS PRN
Qty: 90 TABLET | Refills: 0 | Status: SHIPPED | OUTPATIENT
Start: 2024-01-08 | End: 2024-02-07

## 2024-01-08 NOTE — TELEPHONE ENCOUNTER
OARRS reviewed. UDS: + for  . Gabapentin, sertraline, oxycodone. Negative naloxone  Last seen: 12/5/2023. Follow-up:   Future Appointments   Date Time Provider Department Center   2/5/2024 11:00 AM Joel oHlcomb APRN - CNP N [facility] Pain [facility] [place]   3/21/2024  2:15 PM Marco George MD N [facility] Heart [facility]   3/26/2024  1:40 PM Tanner Walker MD [facility] Physic [facility]   4/11/2024  2:00 PM Cally Yang APRN - CNP N [facility] Del U [facility]   5/10/2024 11:40 AM Tiff Pierre APRN - CNP N Pulm Med [facility]   10/21/2024  1:20 PM Saúl Alvarez MD [facility] Physic [facility]

## 2024-02-13 ENCOUNTER — OFFICE VISIT (OUTPATIENT)
Dept: PHYSICAL MEDICINE AND REHAB | Age: 64
End: 2024-02-13
Payer: MEDICARE

## 2024-02-13 VITALS
BODY MASS INDEX: 39.94 KG/M2 | WEIGHT: 301.37 LBS | SYSTOLIC BLOOD PRESSURE: 134 MMHG | HEIGHT: 73 IN | DIASTOLIC BLOOD PRESSURE: 72 MMHG

## 2024-02-13 DIAGNOSIS — G89.29 CHRONIC PAIN OF BOTH KNEES: Primary | ICD-10-CM

## 2024-02-13 DIAGNOSIS — M48.062 SPINAL STENOSIS OF LUMBAR REGION WITH NEUROGENIC CLAUDICATION: ICD-10-CM

## 2024-02-13 DIAGNOSIS — M17.0 PRIMARY OSTEOARTHRITIS OF BOTH KNEES: ICD-10-CM

## 2024-02-13 DIAGNOSIS — M47.816 LUMBAR FACET ARTHROPATHY: ICD-10-CM

## 2024-02-13 DIAGNOSIS — M25.561 CHRONIC PAIN OF BOTH KNEES: Primary | ICD-10-CM

## 2024-02-13 DIAGNOSIS — G89.4 CHRONIC PAIN SYNDROME: ICD-10-CM

## 2024-02-13 DIAGNOSIS — I50.32 CHF (CONGESTIVE HEART FAILURE), NYHA CLASS II, CHRONIC, DIASTOLIC (HCC): ICD-10-CM

## 2024-02-13 DIAGNOSIS — M25.562 CHRONIC PAIN OF BOTH KNEES: Primary | ICD-10-CM

## 2024-02-13 DIAGNOSIS — G62.9 NEUROPATHY: ICD-10-CM

## 2024-02-13 DIAGNOSIS — M47.816 SPONDYLOSIS OF LUMBAR REGION WITHOUT MYELOPATHY OR RADICULOPATHY: ICD-10-CM

## 2024-02-13 PROCEDURE — G8427 DOCREV CUR MEDS BY ELIG CLIN: HCPCS | Performed by: NURSE PRACTITIONER

## 2024-02-13 PROCEDURE — G8484 FLU IMMUNIZE NO ADMIN: HCPCS | Performed by: NURSE PRACTITIONER

## 2024-02-13 PROCEDURE — 99214 OFFICE O/P EST MOD 30 MIN: CPT | Performed by: NURSE PRACTITIONER

## 2024-02-13 PROCEDURE — 3078F DIAST BP <80 MM HG: CPT | Performed by: NURSE PRACTITIONER

## 2024-02-13 PROCEDURE — 4004F PT TOBACCO SCREEN RCVD TLK: CPT | Performed by: NURSE PRACTITIONER

## 2024-02-13 PROCEDURE — 3075F SYST BP GE 130 - 139MM HG: CPT | Performed by: NURSE PRACTITIONER

## 2024-02-13 PROCEDURE — G8417 CALC BMI ABV UP PARAM F/U: HCPCS | Performed by: NURSE PRACTITIONER

## 2024-02-13 PROCEDURE — 3017F COLORECTAL CA SCREEN DOC REV: CPT | Performed by: NURSE PRACTITIONER

## 2024-02-13 RX ORDER — OXYCODONE AND ACETAMINOPHEN 10; 325 MG/1; MG/1
1 TABLET ORAL EVERY 8 HOURS PRN
Qty: 90 TABLET | Refills: 0 | Status: SHIPPED | OUTPATIENT
Start: 2024-02-13 | End: 2024-03-14

## 2024-02-13 NOTE — PROGRESS NOTES
[facility] [place] [facility]  [facility]  [address]  [facility] [address]  Dept: [phone]  Dept Fax: [phone]  Loc: [phone]    Visit Date: 2/13/2024    Functionality Assessment/Goals Worksheet     On a scale of 0 (Does not Interfere) to 10 (Completely Interferes)     1.  Which number describes how during the past week pain has interfered with       the following:  A.  General Activity:  10  B.  Mood: 9  C.  Walking Ability:  9  D.  Normal Work (Includes both work outside the home and housework):  9  E.  Relations with Other People:   10  F.  Sleep:   9  G.  Enjoyment of Life:   9    2.  Patient Prefers to Take their Pain Medications:     [x]  On a regular basis   [x]  Only when necessary    []  Does not take pain medications    3.  What are the Patient's Goals/Expectations for Visiting Pain Management?     []  Learn about my pain    [x]  Receive Medication   []  Physical Therapy     []  Treat Depression   [x]  Receive Injections    []  Treat Sleep   []  Deal with Anxiety and Stress   []  Treat Opoid Dependence/Addiction   []  Other:        HPI:   Farhad Myles is a 63 y.o. male is here today for    Chief Complaint: low back pain, knee pain and joint pain     HPI   2 month FU. Patients main compliant today is bilateral knee pain- sharp aching, throbbing pain. States pain is increased with walking. States pain in other joints ankles hips but worse in knees.     Continues to have pain in low back- aching and periods of throbbing pain.    Current pain medications help take the edge off of pain.    Pain increases with bending, lifting, twisting , walking, standing, getting up and down, and housework or working at job, cold and wet weather changes     Continues dialysis 4 days per week.       Medications reviewed. Patient denies side effects with medications. Patient states he is taking medications as prescribed. Braden

## 2024-03-07 LAB
ALBUMIN SERPL-MCNC: 3.9 G/DL
ALP BLD-CCNC: NORMAL U/L
ALT SERPL-CCNC: NORMAL U/L
ANION GAP SERPL CALCULATED.3IONS-SCNC: 1.3 MMOL/L
AST SERPL-CCNC: NORMAL U/L
BILIRUB SERPL-MCNC: NORMAL MG/DL
BUN BLDV-MCNC: 36 MG/DL
CALCIUM SERPL-MCNC: 9.2 MG/DL
CHLORIDE BLD-SCNC: 100 MMOL/L
CO2: NORMAL
CREAT SERPL-MCNC: 5.01 MG/DL
EGFR: NORMAL
GLUCOSE BLD-MCNC: NORMAL MG/DL
POTASSIUM SERPL-SCNC: 4.1 MMOL/L
SODIUM BLD-SCNC: 138 MMOL/L
TOTAL PROTEIN: 7

## 2024-03-13 DIAGNOSIS — M47.816 SPONDYLOSIS OF LUMBAR REGION WITHOUT MYELOPATHY OR RADICULOPATHY: ICD-10-CM

## 2024-03-13 DIAGNOSIS — G62.9 NEUROPATHY: ICD-10-CM

## 2024-03-13 DIAGNOSIS — M47.816 LUMBAR FACET ARTHROPATHY: ICD-10-CM

## 2024-03-13 DIAGNOSIS — M17.0 PRIMARY OSTEOARTHRITIS OF BOTH KNEES: ICD-10-CM

## 2024-03-13 DIAGNOSIS — G89.4 CHRONIC PAIN SYNDROME: ICD-10-CM

## 2024-03-13 DIAGNOSIS — M25.561 CHRONIC PAIN OF BOTH KNEES: ICD-10-CM

## 2024-03-13 DIAGNOSIS — G89.29 CHRONIC PAIN OF BOTH KNEES: ICD-10-CM

## 2024-03-13 DIAGNOSIS — M48.062 SPINAL STENOSIS OF LUMBAR REGION WITH NEUROGENIC CLAUDICATION: ICD-10-CM

## 2024-03-13 DIAGNOSIS — M25.562 CHRONIC PAIN OF BOTH KNEES: ICD-10-CM

## 2024-03-14 RX ORDER — OXYCODONE AND ACETAMINOPHEN 10; 325 MG/1; MG/1
1 TABLET ORAL EVERY 8 HOURS PRN
Qty: 90 TABLET | Refills: 0 | Status: SHIPPED | OUTPATIENT
Start: 2024-03-14 | End: 2024-04-13

## 2024-03-14 NOTE — TELEPHONE ENCOUNTER
OARRS reviewed. UDS: + for  gabapentin,sertraline,oxycodone. Cotinine is present.  Last seen: 2/13/2024. Follow-up: 4/16/24

## 2024-03-21 ENCOUNTER — OFFICE VISIT (OUTPATIENT)
Dept: CARDIOLOGY CLINIC | Age: 64
End: 2024-03-21
Payer: MEDICARE

## 2024-03-21 VITALS
HEIGHT: 73 IN | DIASTOLIC BLOOD PRESSURE: 70 MMHG | HEART RATE: 64 BPM | SYSTOLIC BLOOD PRESSURE: 116 MMHG | BODY MASS INDEX: 40.16 KG/M2 | WEIGHT: 303 LBS

## 2024-03-21 DIAGNOSIS — I50.32 CHF (CONGESTIVE HEART FAILURE), NYHA CLASS II, CHRONIC, DIASTOLIC (HCC): Primary | ICD-10-CM

## 2024-03-21 DIAGNOSIS — I10 PRIMARY HYPERTENSION: ICD-10-CM

## 2024-03-21 PROCEDURE — 3078F DIAST BP <80 MM HG: CPT | Performed by: INTERNAL MEDICINE

## 2024-03-21 PROCEDURE — G8417 CALC BMI ABV UP PARAM F/U: HCPCS | Performed by: INTERNAL MEDICINE

## 2024-03-21 PROCEDURE — G8484 FLU IMMUNIZE NO ADMIN: HCPCS | Performed by: INTERNAL MEDICINE

## 2024-03-21 PROCEDURE — 3074F SYST BP LT 130 MM HG: CPT | Performed by: INTERNAL MEDICINE

## 2024-03-21 PROCEDURE — G8427 DOCREV CUR MEDS BY ELIG CLIN: HCPCS | Performed by: INTERNAL MEDICINE

## 2024-03-21 PROCEDURE — 4004F PT TOBACCO SCREEN RCVD TLK: CPT | Performed by: INTERNAL MEDICINE

## 2024-03-21 PROCEDURE — 3017F COLORECTAL CA SCREEN DOC REV: CPT | Performed by: INTERNAL MEDICINE

## 2024-03-21 PROCEDURE — 93000 ELECTROCARDIOGRAM COMPLETE: CPT | Performed by: INTERNAL MEDICINE

## 2024-03-21 PROCEDURE — 99214 OFFICE O/P EST MOD 30 MIN: CPT | Performed by: INTERNAL MEDICINE

## 2024-03-21 NOTE — PROGRESS NOTES
[facility] PROFESSIONAL [facility] CARDIOLOGY  [address]  [address]  [facility] [address]  Dept: [phone]  Dept Fax: [phone]  Loc: [phone]    Visit Date: 3/21/2024    Mr. Myles is a 63 y.o. male  who presented for:  Chief Complaint   Patient presents with    Follow-up     8 month follow up     Congestive Heart Failure    Hypertension       HPI:   62 yo M  c hx of CAD, HFpEF s/p cardiomems, HTN, DM, Obesity, LAURIE on BIPAP, ESRD on HD, Cardiomyopathy, colon resection 2/2017 is here for a follow up.    Reports some arm pain and left jaw pain during dialysis session.          Current Outpatient Medications:     oxyCODONE-acetaminophen (PERCOCET)  MG per tablet, Take 1 tablet by mouth every 8 hours as needed for Pain for up to 30 days. Intended supply: 30 days Max Daily Amount: 3 tablets, Disp: 90 tablet, Rfl: 0    carvedilol (COREG) 25 MG tablet, Take 1 tablet by mouth 2 times daily, Disp: 180 tablet, Rfl: 1    Lactobacillus 0.05-0.05 MG TABS, Take 1 tablet by mouth daily, Disp: 30 tablet, Rfl: 5    levothyroxine (SYNTHROID) 175 MCG tablet, Take 1 tablet by mouth Daily, Disp: 90 tablet, Rfl: 1    montelukast (SINGULAIR) 10 MG tablet, Take 1 tablet by mouth nightly, Disp: 90 tablet, Rfl: 1    primidone (MYSOLINE) 50 MG tablet, Take 1 tablet by mouth in the morning and at bedtime, Disp: 180 tablet, Rfl: 1    sertraline (ZOLOFT) 50 MG tablet, Take 3 tablets by mouth daily, Disp: 90 tablet, Rfl: 3    melatonin 3 MG TABS tablet, Take 2 tablets by mouth nightly as needed (Sleep), Disp: 60 tablet, Rfl: 11    UNABLE TO FIND, Venafer  injected at dialysis, Disp: , Rfl:     pantoprazole (PROTONIX) 20 MG tablet, Take 1 tablet by mouth every morning (before breakfast), Disp: 30 tablet, Rfl: 3    rOPINIRole (REQUIP) 0.5 MG tablet, Take 1 tablet in the afternoon and 2 tablets at nighttime, Disp: 270 tablet, Rfl: 3    Misc. Devices (CPAP MACHINE) MISC, by Does not apply route Please

## 2024-03-26 ENCOUNTER — OFFICE VISIT (OUTPATIENT)
Dept: INTERNAL MEDICINE CLINIC | Age: 64
End: 2024-03-26
Payer: MEDICARE

## 2024-03-26 VITALS
BODY MASS INDEX: 40.98 KG/M2 | TEMPERATURE: 98.2 F | SYSTOLIC BLOOD PRESSURE: 142 MMHG | HEART RATE: 68 BPM | WEIGHT: 309.2 LBS | DIASTOLIC BLOOD PRESSURE: 70 MMHG | HEIGHT: 73 IN

## 2024-03-26 DIAGNOSIS — N18.4 ANEMIA OF CHRONIC RENAL FAILURE, STAGE 4 (SEVERE) (HCC): ICD-10-CM

## 2024-03-26 DIAGNOSIS — E03.9 HYPOTHYROIDISM, UNSPECIFIED TYPE: ICD-10-CM

## 2024-03-26 DIAGNOSIS — K90.9 DIARRHEA DUE TO MALABSORPTION: ICD-10-CM

## 2024-03-26 DIAGNOSIS — R19.7 DIARRHEA DUE TO MALABSORPTION: ICD-10-CM

## 2024-03-26 DIAGNOSIS — R25.1 TREMOR: ICD-10-CM

## 2024-03-26 DIAGNOSIS — D63.1 ANEMIA OF CHRONIC RENAL FAILURE, STAGE 4 (SEVERE) (HCC): ICD-10-CM

## 2024-03-26 DIAGNOSIS — K21.9 GASTROESOPHAGEAL REFLUX DISEASE WITHOUT ESOPHAGITIS: ICD-10-CM

## 2024-03-26 DIAGNOSIS — K59.00 CONSTIPATION, UNSPECIFIED CONSTIPATION TYPE: ICD-10-CM

## 2024-03-26 DIAGNOSIS — I10 PRIMARY HYPERTENSION: ICD-10-CM

## 2024-03-26 DIAGNOSIS — E78.2 MIXED HYPERLIPIDEMIA: ICD-10-CM

## 2024-03-26 DIAGNOSIS — F32.9 MAJOR DEPRESSIVE DISORDER WITH CURRENT ACTIVE EPISODE, UNSPECIFIED DEPRESSION EPISODE SEVERITY, UNSPECIFIED WHETHER RECURRENT: Primary | ICD-10-CM

## 2024-03-26 DIAGNOSIS — G25.81 RLS (RESTLESS LEGS SYNDROME): ICD-10-CM

## 2024-03-26 DIAGNOSIS — R07.89 ATYPICAL CHEST PAIN: ICD-10-CM

## 2024-03-26 DIAGNOSIS — L98.9 NON-HEALING SKIN LESION: ICD-10-CM

## 2024-03-26 DIAGNOSIS — I50.43 CHF (CONGESTIVE HEART FAILURE), NYHA CLASS III, ACUTE ON CHRONIC, COMBINED (HCC): ICD-10-CM

## 2024-03-26 DIAGNOSIS — N18.6 ESRD (END STAGE RENAL DISEASE) (HCC): ICD-10-CM

## 2024-03-26 PROCEDURE — 3017F COLORECTAL CA SCREEN DOC REV: CPT

## 2024-03-26 PROCEDURE — 99214 OFFICE O/P EST MOD 30 MIN: CPT

## 2024-03-26 PROCEDURE — 3077F SYST BP >= 140 MM HG: CPT

## 2024-03-26 PROCEDURE — G8417 CALC BMI ABV UP PARAM F/U: HCPCS

## 2024-03-26 PROCEDURE — G8484 FLU IMMUNIZE NO ADMIN: HCPCS

## 2024-03-26 PROCEDURE — 4004F PT TOBACCO SCREEN RCVD TLK: CPT

## 2024-03-26 PROCEDURE — 3078F DIAST BP <80 MM HG: CPT

## 2024-03-26 PROCEDURE — G8427 DOCREV CUR MEDS BY ELIG CLIN: HCPCS

## 2024-03-26 RX ORDER — SERTRALINE HYDROCHLORIDE 100 MG/1
200 TABLET, FILM COATED ORAL DAILY
Qty: 90 TABLET | Refills: 3 | Status: SHIPPED | OUTPATIENT
Start: 2024-03-26

## 2024-03-26 RX ORDER — LEVOTHYROXINE SODIUM 175 UG/1
175 TABLET ORAL DAILY
Qty: 90 TABLET | Refills: 1 | Status: SHIPPED | OUTPATIENT
Start: 2024-03-26

## 2024-03-26 RX ORDER — PANTOPRAZOLE SODIUM 20 MG/1
20 TABLET, DELAYED RELEASE ORAL
Qty: 30 TABLET | Refills: 5 | Status: SHIPPED | OUTPATIENT
Start: 2024-03-26

## 2024-03-26 RX ORDER — ROPINIROLE 0.5 MG/1
TABLET, FILM COATED ORAL
Qty: 270 TABLET | Refills: 3 | Status: SHIPPED | OUTPATIENT
Start: 2024-03-26

## 2024-03-26 RX ORDER — CARVEDILOL 25 MG/1
25 TABLET ORAL 2 TIMES DAILY
Qty: 180 TABLET | Refills: 1 | Status: SHIPPED | OUTPATIENT
Start: 2024-03-26

## 2024-03-26 RX ORDER — PRIMIDONE 50 MG/1
50 TABLET ORAL 2 TIMES DAILY
Qty: 180 TABLET | Refills: 1 | Status: SHIPPED | OUTPATIENT
Start: 2024-03-26

## 2024-03-26 RX ORDER — MONTELUKAST SODIUM 10 MG/1
10 TABLET ORAL NIGHTLY
Qty: 90 TABLET | Refills: 1 | Status: SHIPPED | OUTPATIENT
Start: 2024-03-26

## 2024-03-26 RX ORDER — L. ACIDOPHILUS/L.BULGARICUS 1MM CELL
1 TABLET ORAL DAILY
Qty: 30 TABLET | Refills: 5 | Status: SHIPPED | OUTPATIENT
Start: 2024-03-26 | End: 2024-05-09

## 2024-03-26 ASSESSMENT — PATIENT HEALTH QUESTIONNAIRE - PHQ9
8. MOVING OR SPEAKING SO SLOWLY THAT OTHER PEOPLE COULD HAVE NOTICED. OR THE OPPOSITE, BEING SO FIGETY OR RESTLESS THAT YOU HAVE BEEN MOVING AROUND A LOT MORE THAN USUAL: NOT AT ALL
SUM OF ALL RESPONSES TO PHQ QUESTIONS 1-9: 19
2. FEELING DOWN, DEPRESSED OR HOPELESS: NEARLY EVERY DAY
7. TROUBLE CONCENTRATING ON THINGS, SUCH AS READING THE NEWSPAPER OR WATCHING TELEVISION: NEARLY EVERY DAY
9. THOUGHTS THAT YOU WOULD BE BETTER OFF DEAD, OR OF HURTING YOURSELF: NOT AT ALL
5. POOR APPETITE OR OVEREATING: SEVERAL DAYS
SUM OF ALL RESPONSES TO PHQ QUESTIONS 1-9: 19
10. IF YOU CHECKED OFF ANY PROBLEMS, HOW DIFFICULT HAVE THESE PROBLEMS MADE IT FOR YOU TO DO YOUR WORK, TAKE CARE OF THINGS AT HOME, OR GET ALONG WITH OTHER PEOPLE: VERY DIFFICULT
4. FEELING TIRED OR HAVING LITTLE ENERGY: NEARLY EVERY DAY
SUM OF ALL RESPONSES TO PHQ QUESTIONS 1-9: 19
3. TROUBLE FALLING OR STAYING ASLEEP: NEARLY EVERY DAY
SUM OF ALL RESPONSES TO PHQ9 QUESTIONS 1 & 2: 6
6. FEELING BAD ABOUT YOURSELF - OR THAT YOU ARE A FAILURE OR HAVE LET YOURSELF OR YOUR FAMILY DOWN: NEARLY EVERY DAY
SUM OF ALL RESPONSES TO PHQ QUESTIONS 1-9: 19

## 2024-03-26 NOTE — PROGRESS NOTES
[facility] Internal Medicine   [address]  [place] [address]  Dept: [phone]  Dept Fax: [phone]    Farhad Myles (1960) is a 63 y.o. male Established patient, here for evaluation of the following chief complaint(s):  Chief Complaint   Patient presents with    Depression     3 months     Hypothyroidism    Congestive Heart Failure    RLS        ASSESSMENT AND PLAN     1. Major depressive disorder with current active episode, unspecified depression episode severity, unspecified whether recurrent  -     sertraline (ZOLOFT) 100 MG tablet; Take 2 tablets by mouth daily, Disp-90 tablet, R-3Normal  2. Atypical chest pain  3. ESRD (end stage renal disease) ([facility])  4. Hypothyroidism, unspecified type  -     levothyroxine (SYNTHROID) 175 MCG tablet; Take 1 tablet by mouth Daily, Disp-90 tablet, R-1Normal  -     TSH With Reflex Ft4; Future  5. Diarrhea due to malabsorption  -     Lactobacillus 0.05-0.05 MG TABS; Take 1 tablet by mouth daily, Disp-30 tablet, R-5Normal  6. Constipation, unspecified constipation type  -     Lactobacillus 0.05-0.05 MG TABS; Take 1 tablet by mouth daily, Disp-30 tablet, R-5Normal  7. CHF (congestive heart failure), NYHA class III, acute on chronic, combined ([facility])  -     carvedilol (COREG) 25 MG tablet; Take 1 tablet by mouth 2 times daily, Disp-180 tablet, R-1Normal  8. Gastroesophageal reflux disease without esophagitis  -     pantoprazole (PROTONIX) 20 MG tablet; Take 1 tablet by mouth every morning (before breakfast), Disp-30 tablet, R-5Normal  9. Anemia of chronic renal failure, stage 4 (severe) ([facility])  10. RLS (restless legs syndrome)  -     primidone (MYSOLINE) 50 MG tablet; Take 1 tablet by mouth in the morning and at bedtime, Disp-180 tablet, R-1Normal  -     rOPINIRole (REQUIP) 0.5 MG tablet; Take 1 tablet in the afternoon and 2 tablets at nighttime, Disp-270 tablet, R-3Normal  11. Non-healing skin lesion  -     AFL - Gamaliel Joseph MD,

## 2024-03-26 NOTE — PATIENT INSTRUCTIONS
Zoloft increased to 200mg daily  Labs ordered  Referral to Dermatology for non-healing skin lesion  Re-fills sent

## 2024-04-02 DIAGNOSIS — K21.9 GASTROESOPHAGEAL REFLUX DISEASE WITHOUT ESOPHAGITIS: ICD-10-CM

## 2024-04-02 RX ORDER — PANTOPRAZOLE SODIUM 20 MG/1
20 TABLET, DELAYED RELEASE ORAL
Qty: 90 TABLET | Refills: 3 | OUTPATIENT
Start: 2024-04-02

## 2024-04-08 DIAGNOSIS — K90.9 DIARRHEA DUE TO MALABSORPTION: ICD-10-CM

## 2024-04-08 DIAGNOSIS — R19.7 DIARRHEA DUE TO MALABSORPTION: ICD-10-CM

## 2024-04-08 DIAGNOSIS — K59.00 CONSTIPATION, UNSPECIFIED CONSTIPATION TYPE: ICD-10-CM

## 2024-04-08 DIAGNOSIS — F32.9 MAJOR DEPRESSIVE DISORDER WITH CURRENT ACTIVE EPISODE, UNSPECIFIED DEPRESSION EPISODE SEVERITY, UNSPECIFIED WHETHER RECURRENT: ICD-10-CM

## 2024-04-09 ENCOUNTER — HOSPITAL ENCOUNTER (OUTPATIENT)
Age: 64
Discharge: HOME | End: 2024-04-09
Payer: MEDICARE

## 2024-04-09 DIAGNOSIS — R97.20 ELEVATED PSA: ICD-10-CM

## 2024-04-09 DIAGNOSIS — E03.9 HYPOTHYROIDISM, UNSPECIFIED TYPE: ICD-10-CM

## 2024-04-09 LAB — TSH SERPL DL<=0.005 MIU/L-ACNC: 10.47 UIU/ML (ref 0.4–4.2)

## 2024-04-09 PROCEDURE — 36415 COLL VENOUS BLD VENIPUNCTURE: CPT

## 2024-04-09 PROCEDURE — 84443 ASSAY THYROID STIM HORMONE: CPT

## 2024-04-09 PROCEDURE — 84153 ASSAY OF PSA TOTAL: CPT

## 2024-04-09 PROCEDURE — 84439 ASSAY OF FREE THYROXINE: CPT

## 2024-04-09 RX ORDER — SERTRALINE HYDROCHLORIDE 100 MG/1
100 TABLET, FILM COATED ORAL DAILY
Qty: 30 TABLET | Refills: 1 | OUTPATIENT
Start: 2024-04-09

## 2024-04-09 RX ORDER — L. ACIDOPHILUS/L.BULGARICUS 1MM CELL
4 TABLET ORAL DAILY
Qty: 120 TABLET | Refills: 1 | OUTPATIENT
Start: 2024-04-09

## 2024-04-10 LAB
PSA SERPL-MCNC: 2.22 NG/ML (ref 0–1)
T4 FREE SERPL-MCNC: 0.71 NG/DL (ref 0.93–1.68)

## 2024-04-10 NOTE — PROGRESS NOTES
[facility] [place] [facility]  [facility] UROLOGY  [address] [facility] [address]  Dept: [phone]  Loc: [phone]    Visit Date: 4/11/2024        HPI:     Farhad Myles is a 63 y.o. male who presents today for:  Chief Complaint   Patient presents with    Benign Prostatic Hypertrophy    Urinary Frequency at Night    Results     Psa done prior    Follow-up     Patient states is having some burning sensation but is on dialysis, willing to give urine sample just to know if uti        HPI  Patient presents to urology clinic for follow-up.     Farhad underwent circumcision 3/2023 by Dr. Gold. He reports from urology standpoint things are going okay. Denies flank pain, suprapubic pressure, gross hematuria, fever or chills.      BPH- Stable on Flomax    Briefly discussed prostate outlet surgeries in past . Pt is esrd on HD.      Gross hematuria- had hematuria with clots-- resolved with abx. Cysto negative     Left testicular pain- resolved     Elevated PSA- psa stable over time. Possible slight increase d/t infection.      Phimosis- s/p circ doing well      Current Outpatient Medications   Medication Sig Dispense Refill    doxycycline hyclate (VIBRA-TABS) 100 MG tablet Take 1 tablet by mouth 2 times daily for 14 days 28 tablet 0    tamsulosin (FLOMAX) 0.4 MG capsule Take 1 capsule by mouth daily 90 capsule 3    Lactobacillus 0.05-0.05 MG TABS Take 1 tablet by mouth daily 30 tablet 5    carvedilol (COREG) 25 MG tablet Take 1 tablet by mouth 2 times daily 180 tablet 1    levothyroxine (SYNTHROID) 175 MCG tablet Take 1 tablet by mouth Daily 90 tablet 1    montelukast (SINGULAIR) 10 MG tablet Take 1 tablet by mouth nightly 90 tablet 1    pantoprazole (PROTONIX) 20 MG tablet Take 1 tablet by mouth every morning (before breakfast) 30 tablet 5    primidone (MYSOLINE) 50 MG tablet Take 1 tablet by mouth in the morning and at bedtime 180 tablet 1    rOPINIRole (REQUIP) 0.5 MG tablet Take 1 tablet

## 2024-04-11 ENCOUNTER — OFFICE VISIT (OUTPATIENT)
Dept: UROLOGY | Age: 64
End: 2024-04-11
Payer: MEDICARE

## 2024-04-11 VITALS — RESPIRATION RATE: 16 BRPM | WEIGHT: 309 LBS | BODY MASS INDEX: 40.95 KG/M2 | HEIGHT: 73 IN

## 2024-04-11 DIAGNOSIS — R30.0 DYSURIA: ICD-10-CM

## 2024-04-11 DIAGNOSIS — N40.1 BENIGN PROSTATIC HYPERPLASIA WITH URINARY FREQUENCY: Primary | ICD-10-CM

## 2024-04-11 DIAGNOSIS — R97.20 ELEVATED PSA: ICD-10-CM

## 2024-04-11 DIAGNOSIS — N47.1 PHIMOSIS: ICD-10-CM

## 2024-04-11 DIAGNOSIS — R35.0 BENIGN PROSTATIC HYPERPLASIA WITH URINARY FREQUENCY: Primary | ICD-10-CM

## 2024-04-11 DIAGNOSIS — R31.0 GROSS HEMATURIA: ICD-10-CM

## 2024-04-11 LAB
BILIRUBIN, POC: NEGATIVE
BLOOD URINE, POC: NORMAL
CLARITY, POC: NORMAL
COLOR, POC: YELLOW
GLUCOSE URINE, POC: NEGATIVE
KETONES, POC: NEGATIVE
LEUKOCYTE EST, POC: NORMAL
NITRITE, POC: NEGATIVE
PH, POC: 5
PROTEIN, POC: NORMAL
SPECIFIC GRAVITY, POC: 1
UROBILINOGEN, POC: 0.2

## 2024-04-11 PROCEDURE — 4004F PT TOBACCO SCREEN RCVD TLK: CPT | Performed by: NURSE PRACTITIONER

## 2024-04-11 PROCEDURE — 99214 OFFICE O/P EST MOD 30 MIN: CPT | Performed by: NURSE PRACTITIONER

## 2024-04-11 PROCEDURE — G8427 DOCREV CUR MEDS BY ELIG CLIN: HCPCS | Performed by: NURSE PRACTITIONER

## 2024-04-11 PROCEDURE — 3017F COLORECTAL CA SCREEN DOC REV: CPT | Performed by: NURSE PRACTITIONER

## 2024-04-11 PROCEDURE — G8417 CALC BMI ABV UP PARAM F/U: HCPCS | Performed by: NURSE PRACTITIONER

## 2024-04-11 PROCEDURE — 81002 URINALYSIS NONAUTO W/O SCOPE: CPT | Performed by: NURSE PRACTITIONER

## 2024-04-11 RX ORDER — TAMSULOSIN HYDROCHLORIDE 0.4 MG/1
0.4 CAPSULE ORAL DAILY
Qty: 90 CAPSULE | Refills: 3 | Status: SHIPPED | OUTPATIENT
Start: 2024-04-11 | End: 2024-07-10

## 2024-04-11 RX ORDER — DOXYCYCLINE HYCLATE 100 MG
100 TABLET ORAL 2 TIMES DAILY
Qty: 28 TABLET | Refills: 0 | Status: SHIPPED | OUTPATIENT
Start: 2024-04-11 | End: 2024-04-25

## 2024-04-11 NOTE — PATIENT INSTRUCTIONS
Patient should take medication with food and drink at least 8 ounces (240 mls) of water when swallowing the medication.  Pt should also sit up at least 30 minutes after taking medication to reduce the risk of esophageal irritation.   Medication can make pt more likely to experience severe sunburn.  Avoid prolonged unprotected sun exposure while taking medication.     Call sooner with any questions or concerns.

## 2024-04-14 LAB
BACTERIA UR CULT: ABNORMAL
BACTERIA UR CULT: ABNORMAL
ORGANISM: ABNORMAL

## 2024-04-16 ENCOUNTER — OFFICE VISIT (OUTPATIENT)
Dept: PHYSICAL MEDICINE AND REHAB | Age: 64
End: 2024-04-16
Payer: MEDICARE

## 2024-04-16 ENCOUNTER — TELEPHONE (OUTPATIENT)
Dept: INTERNAL MEDICINE CLINIC | Age: 64
End: 2024-04-16

## 2024-04-16 VITALS
WEIGHT: 309.08 LBS | SYSTOLIC BLOOD PRESSURE: 132 MMHG | HEIGHT: 73 IN | DIASTOLIC BLOOD PRESSURE: 76 MMHG | BODY MASS INDEX: 40.96 KG/M2

## 2024-04-16 DIAGNOSIS — M25.562 CHRONIC PAIN OF BOTH KNEES: ICD-10-CM

## 2024-04-16 DIAGNOSIS — M17.0 PRIMARY OSTEOARTHRITIS OF BOTH KNEES: ICD-10-CM

## 2024-04-16 DIAGNOSIS — I50.32 CHF (CONGESTIVE HEART FAILURE), NYHA CLASS II, CHRONIC, DIASTOLIC (HCC): ICD-10-CM

## 2024-04-16 DIAGNOSIS — M47.816 LUMBAR FACET ARTHROPATHY: ICD-10-CM

## 2024-04-16 DIAGNOSIS — E03.9 HYPOTHYROIDISM, UNSPECIFIED TYPE: Primary | ICD-10-CM

## 2024-04-16 DIAGNOSIS — G62.9 NEUROPATHY: ICD-10-CM

## 2024-04-16 DIAGNOSIS — M25.561 CHRONIC PAIN OF BOTH KNEES: ICD-10-CM

## 2024-04-16 DIAGNOSIS — N18.4 CHRONIC KIDNEY DISEASE, STAGE IV (SEVERE) (HCC): ICD-10-CM

## 2024-04-16 DIAGNOSIS — M48.062 SPINAL STENOSIS OF LUMBAR REGION WITH NEUROGENIC CLAUDICATION: ICD-10-CM

## 2024-04-16 DIAGNOSIS — G89.4 CHRONIC PAIN SYNDROME: ICD-10-CM

## 2024-04-16 DIAGNOSIS — G89.29 CHRONIC PAIN OF BOTH KNEES: ICD-10-CM

## 2024-04-16 DIAGNOSIS — M47.816 SPONDYLOSIS OF LUMBAR REGION WITHOUT MYELOPATHY OR RADICULOPATHY: Primary | ICD-10-CM

## 2024-04-16 PROCEDURE — 3078F DIAST BP <80 MM HG: CPT | Performed by: NURSE PRACTITIONER

## 2024-04-16 PROCEDURE — G8427 DOCREV CUR MEDS BY ELIG CLIN: HCPCS | Performed by: NURSE PRACTITIONER

## 2024-04-16 PROCEDURE — 3017F COLORECTAL CA SCREEN DOC REV: CPT | Performed by: NURSE PRACTITIONER

## 2024-04-16 PROCEDURE — 4004F PT TOBACCO SCREEN RCVD TLK: CPT | Performed by: NURSE PRACTITIONER

## 2024-04-16 PROCEDURE — 99214 OFFICE O/P EST MOD 30 MIN: CPT | Performed by: NURSE PRACTITIONER

## 2024-04-16 PROCEDURE — G8417 CALC BMI ABV UP PARAM F/U: HCPCS | Performed by: NURSE PRACTITIONER

## 2024-04-16 PROCEDURE — 3075F SYST BP GE 130 - 139MM HG: CPT | Performed by: NURSE PRACTITIONER

## 2024-04-16 RX ORDER — OXYCODONE AND ACETAMINOPHEN 10; 325 MG/1; MG/1
1 TABLET ORAL EVERY 8 HOURS PRN
Qty: 90 TABLET | Refills: 0 | Status: SHIPPED | OUTPATIENT
Start: 2024-04-16 | End: 2024-05-16

## 2024-04-16 ASSESSMENT — ENCOUNTER SYMPTOMS
BACK PAIN: 1
COUGH: 0
STRIDOR: 0
DIARRHEA: 0
ABDOMINAL PAIN: 0
GASTROINTESTINAL NEGATIVE: 1
CHOKING: 0
APNEA: 1
WHEEZING: 0
CONSTIPATION: 0
CHEST TIGHTNESS: 0
SHORTNESS OF BREATH: 0

## 2024-04-16 NOTE — TELEPHONE ENCOUNTER
----- Message from Dulce Maria Crespo MD sent at 4/14/2024 10:15 PM EDT -----  Please call patient and let them know results  - looks like he is hypothyroid on 175 mcg daily.  Call patient and ask if he ran out of levothyroxine or missed doses in the 6 weeks prior to lab test.  If he has faithfully taken medication - increase levothyroxine to 188 mcg daily.  Repeat TSH and FT4 before next visit 6/2024.

## 2024-04-16 NOTE — TELEPHONE ENCOUNTER
Patient states he thinks he did miss a few pills. He tries to remember everyday but sometimes forgets. I told him to make sure he doesn't miss any and have the labs repeated about 1-2 weeks prior to his June appointment.

## 2024-04-16 NOTE — PROGRESS NOTES
Deep Tendon Reflexes:      Reflex Scores:       Tricep reflexes are 2+ on the right side and 2+ on the left side.       Bicep reflexes are 2+ on the right side and 2+ on the left side.       Brachioradialis reflexes are 2+ on the right side and 2+ on the left side.       Patellar reflexes are 1+ on the right side and 1+ on the left side.       Achilles reflexes are 1+ on the right side and 1+ on the left side.     Comments: 4/5/strength bilateral lower extremity    Decreased sensation bilateral feet    Psychiatric:         Mood and Affect: Mood normal.         Behavior: Behavior normal.     SAUNDRA  Patricks test  positive  Yeoman's  or Gaenslen's positive       Assessment:     1. 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 Lumbar facet arthropathy    3. Spinal stenosis of lumbar region with neurogenic claudication    4. Neuropathy    5. Primary osteoarthritis of both knees    6. Chronic pain of both knees    7. Chronic pain syndrome    8. CHF (congestive heart failure), NYHA class II, chronic, diastolic ([facility])    9. Chronic kidney disease, stage IV (severe) ([facility])            Plan:      OARRS reviewed. Current MED: 45.00  Patient was offered naloxone for home.   Discussed long term side effects of medications, tolerance, dependency and addiction.  Previous UDS reviewed  UDS preformed today for compliance.  Patient told can not receive any pain medications from any other source.  No evidence of abuse, diversion or aberrant behavior.  Medications and/or procedures to improve function and quality of life- patient understanding with this and that may not be pain free  Discussed with patient about safe storage of medications at home  Discussed possible weaning of medication dosing dependent on treatment/procedure results.   Discussed with patient about risks with procedure including infection, reaction to medication, increased pain, or bleeding.  Reviewed updated lumbar xray again and discussed

## 2024-04-17 DIAGNOSIS — G89.4 CHRONIC PAIN SYNDROME: ICD-10-CM

## 2024-04-17 DIAGNOSIS — G62.9 NEUROPATHY: ICD-10-CM

## 2024-04-18 RX ORDER — GABAPENTIN 300 MG/1
300 CAPSULE ORAL NIGHTLY
Qty: 90 CAPSULE | Refills: 3 | Status: SHIPPED | OUTPATIENT
Start: 2024-04-18 | End: 2025-04-13

## 2024-05-02 DIAGNOSIS — F32.9 MAJOR DEPRESSIVE DISORDER WITH CURRENT ACTIVE EPISODE, UNSPECIFIED DEPRESSION EPISODE SEVERITY, UNSPECIFIED WHETHER RECURRENT: ICD-10-CM

## 2024-05-09 ENCOUNTER — APPOINTMENT (OUTPATIENT)
Dept: GENERAL RADIOLOGY | Age: 64
DRG: 321 | End: 2024-05-09
Payer: MEDICARE

## 2024-05-09 ENCOUNTER — HOSPITAL ENCOUNTER (INPATIENT)
Age: 64
LOS: 5 days | Discharge: HOME | DRG: 321 | End: 2024-05-14
Attending: EMERGENCY MEDICINE | Admitting: STUDENT IN AN ORGANIZED HEALTH CARE EDUCATION/TRAINING PROGRAM
Payer: MEDICARE

## 2024-05-09 DIAGNOSIS — J81.0 ACUTE PULMONARY EDEMA (HCC): Primary | ICD-10-CM

## 2024-05-09 DIAGNOSIS — R07.9 CHEST PAIN, UNSPECIFIED TYPE: ICD-10-CM

## 2024-05-09 DIAGNOSIS — R00.2 PALPITATION: ICD-10-CM

## 2024-05-09 DIAGNOSIS — I20.9 ANGINA PECTORIS (HCC): ICD-10-CM

## 2024-05-09 DIAGNOSIS — E03.9 HYPOTHYROIDISM, UNSPECIFIED TYPE: ICD-10-CM

## 2024-05-09 DIAGNOSIS — I24.9 ACUTE CORONARY SYNDROME (HCC): ICD-10-CM

## 2024-05-09 DIAGNOSIS — I20.0 UNSTABLE ANGINA (HCC): ICD-10-CM

## 2024-05-09 LAB
ALBUMIN SERPL BCG-MCNC: 3.8 G/DL (ref 3.5–5.1)
ALP SERPL-CCNC: 84 U/L (ref 38–126)
ALT SERPL W/O P-5'-P-CCNC: 11 U/L (ref 11–66)
ANION GAP SERPL CALC-SCNC: 13 MEQ/L (ref 8–16)
AST SERPL-CCNC: 13 U/L (ref 5–40)
BASOPHILS ABSOLUTE: 0.1 THOU/MM3 (ref 0–0.1)
BASOPHILS NFR BLD AUTO: 0.8 %
BILIRUB SERPL-MCNC: 0.3 MG/DL (ref 0.3–1.2)
BUN SERPL-MCNC: 42 MG/DL (ref 7–22)
CALCIUM SERPL-MCNC: 9.1 MG/DL (ref 8.5–10.5)
CHLORIDE SERPL-SCNC: 97 MEQ/L (ref 98–111)
CO2 SERPL-SCNC: 26 MEQ/L (ref 23–33)
CREAT SERPL-MCNC: 5.3 MG/DL (ref 0.4–1.2)
DEPRECATED RDW RBC AUTO: 50.2 FL (ref 35–45)
EOSINOPHIL NFR BLD AUTO: 2.1 %
EOSINOPHILS ABSOLUTE: 0.2 THOU/MM3 (ref 0–0.4)
ERYTHROCYTE [DISTWIDTH] IN BLOOD BY AUTOMATED COUNT: 14.5 % (ref 11.5–14.5)
FLUAV RNA RESP QL NAA+PROBE: NOT DETECTED
FLUBV RNA RESP QL NAA+PROBE: NOT DETECTED
GFR SERPL CREATININE-BSD FRML MDRD: 12 ML/MIN/1.73M2
GLUCOSE SERPL-MCNC: 108 MG/DL (ref 70–108)
HCT VFR BLD AUTO: 32.2 % (ref 42–52)
HGB BLD-MCNC: 10.7 GM/DL (ref 14–18)
IMM GRANULOCYTES # BLD AUTO: 0.03 THOU/MM3 (ref 0–0.07)
IMM GRANULOCYTES NFR BLD AUTO: 0.4 %
LYMPHOCYTES ABSOLUTE: 0.7 THOU/MM3 (ref 1–4.8)
LYMPHOCYTES NFR BLD AUTO: 8.6 %
MCH RBC QN AUTO: 31.5 PG (ref 26–33)
MCHC RBC AUTO-ENTMCNC: 33.2 GM/DL (ref 32.2–35.5)
MCV RBC AUTO: 94.7 FL (ref 80–94)
MONOCYTES ABSOLUTE: 0.8 THOU/MM3 (ref 0.4–1.3)
MONOCYTES NFR BLD AUTO: 9.6 %
NEUTROPHILS ABSOLUTE: 6.2 THOU/MM3 (ref 1.8–7.7)
NEUTROPHILS NFR BLD AUTO: 78.5 %
NRBC BLD AUTO-RTO: 0 /100 WBC
NT-PROBNP SERPL IA-MCNC: ABNORMAL PG/ML (ref 0–124)
OSMOLALITY SERPL CALC.SUM OF ELEC: 283 MOSMOL/KG (ref 275–300)
PLATELET # BLD AUTO: 232 THOU/MM3 (ref 130–400)
PMV BLD AUTO: 9.5 FL (ref 9.4–12.4)
POTASSIUM SERPL-SCNC: 3.7 MEQ/L (ref 3.5–5.2)
PROCALCITONIN SERPL IA-MCNC: 0.15 NG/ML (ref 0.01–0.09)
PROT SERPL-MCNC: 7.3 G/DL (ref 6.1–8)
RBC # BLD AUTO: 3.4 MILL/MM3 (ref 4.7–6.1)
SARS-COV-2 RNA RESP QL NAA+PROBE: NOT DETECTED
SODIUM SERPL-SCNC: 136 MEQ/L (ref 135–145)
T4 FREE SERPL-MCNC: < 0.1 NG/DL (ref 0.93–1.68)
TROPONIN, HIGH SENSITIVITY: 59 NG/L (ref 0–12)
TROPONIN, HIGH SENSITIVITY: 63 NG/L (ref 0–12)
TSH SERPL DL<=0.005 MIU/L-ACNC: 22.14 UIU/ML (ref 0.4–4.2)
WBC # BLD AUTO: 7.9 THOU/MM3 (ref 4.8–10.8)

## 2024-05-09 PROCEDURE — 80053 COMPREHEN METABOLIC PANEL: CPT

## 2024-05-09 PROCEDURE — 1200000003 HC TELEMETRY R&B

## 2024-05-09 PROCEDURE — 99223 1ST HOSP IP/OBS HIGH 75: CPT | Performed by: NURSE PRACTITIONER

## 2024-05-09 PROCEDURE — 36415 COLL VENOUS BLD VENIPUNCTURE: CPT

## 2024-05-09 PROCEDURE — 84443 ASSAY THYROID STIM HORMONE: CPT

## 2024-05-09 PROCEDURE — 84484 ASSAY OF TROPONIN QUANT: CPT

## 2024-05-09 PROCEDURE — 71046 X-RAY EXAM CHEST 2 VIEWS: CPT

## 2024-05-09 PROCEDURE — 84439 ASSAY OF FREE THYROXINE: CPT

## 2024-05-09 PROCEDURE — 83880 ASSAY OF NATRIURETIC PEPTIDE: CPT

## 2024-05-09 PROCEDURE — 84145 PROCALCITONIN (PCT): CPT

## 2024-05-09 PROCEDURE — 93005 ELECTROCARDIOGRAM TRACING: CPT | Performed by: EMERGENCY MEDICINE

## 2024-05-09 PROCEDURE — 85025 COMPLETE CBC W/AUTO DIFF WBC: CPT

## 2024-05-09 PROCEDURE — 99285 EMERGENCY DEPT VISIT HI MDM: CPT

## 2024-05-09 PROCEDURE — 87636 SARSCOV2 & INF A&B AMP PRB: CPT

## 2024-05-09 RX ORDER — ROPINIROLE 1 MG/1
1 TABLET, FILM COATED ORAL NIGHTLY
Status: DISCONTINUED | OUTPATIENT
Start: 2024-05-09 | End: 2024-05-14 | Stop reason: HOSPADM

## 2024-05-09 RX ORDER — OXYCODONE AND ACETAMINOPHEN 10; 325 MG/1; MG/1
1 TABLET ORAL EVERY 8 HOURS PRN
Status: DISCONTINUED | OUTPATIENT
Start: 2024-05-09 | End: 2024-05-14 | Stop reason: HOSPADM

## 2024-05-09 RX ORDER — ALBUTEROL SULFATE 90 UG/1
2 AEROSOL, METERED RESPIRATORY (INHALATION) EVERY 6 HOURS PRN
Status: DISCONTINUED | OUTPATIENT
Start: 2024-05-09 | End: 2024-05-14 | Stop reason: HOSPADM

## 2024-05-09 RX ORDER — IPRATROPIUM BROMIDE AND ALBUTEROL SULFATE 2.5; .5 MG/3ML; MG/3ML
1 SOLUTION RESPIRATORY (INHALATION) EVERY 4 HOURS PRN
Status: DISCONTINUED | OUTPATIENT
Start: 2024-05-09 | End: 2024-05-14 | Stop reason: HOSPADM

## 2024-05-09 RX ORDER — MONTELUKAST SODIUM 10 MG/1
10 TABLET ORAL NIGHTLY
Status: DISCONTINUED | OUTPATIENT
Start: 2024-05-09 | End: 2024-05-14 | Stop reason: HOSPADM

## 2024-05-09 RX ORDER — SERTRALINE HYDROCHLORIDE 100 MG/1
200 TABLET, FILM COATED ORAL DAILY
Status: DISCONTINUED | OUTPATIENT
Start: 2024-05-10 | End: 2024-05-14 | Stop reason: HOSPADM

## 2024-05-09 RX ORDER — TAMSULOSIN HYDROCHLORIDE 0.4 MG/1
0.4 CAPSULE ORAL NIGHTLY
Status: DISCONTINUED | OUTPATIENT
Start: 2024-05-09 | End: 2024-05-14 | Stop reason: HOSPADM

## 2024-05-09 RX ORDER — MULTIVITAMIN WITH IRON
1 TABLET ORAL NIGHTLY
Status: DISCONTINUED | OUTPATIENT
Start: 2024-05-09 | End: 2024-05-14 | Stop reason: HOSPADM

## 2024-05-09 RX ORDER — PRIMIDONE 50 MG/1
50 TABLET ORAL 2 TIMES DAILY
Status: DISCONTINUED | OUTPATIENT
Start: 2024-05-09 | End: 2024-05-14 | Stop reason: HOSPADM

## 2024-05-09 RX ORDER — CARVEDILOL 25 MG/1
25 TABLET ORAL 2 TIMES DAILY WITH MEALS
Status: DISCONTINUED | OUTPATIENT
Start: 2024-05-10 | End: 2024-05-14 | Stop reason: HOSPADM

## 2024-05-09 RX ORDER — GABAPENTIN 300 MG/1
600 CAPSULE ORAL NIGHTLY
Status: DISCONTINUED | OUTPATIENT
Start: 2024-05-09 | End: 2024-05-10

## 2024-05-09 RX ORDER — ASPIRIN 81 MG/1
81 TABLET ORAL DAILY
Status: DISCONTINUED | OUTPATIENT
Start: 2024-05-10 | End: 2024-05-14 | Stop reason: HOSPADM

## 2024-05-09 RX ORDER — PANTOPRAZOLE SODIUM 40 MG/1
40 TABLET, DELAYED RELEASE ORAL
Status: DISCONTINUED | OUTPATIENT
Start: 2024-05-10 | End: 2024-05-14 | Stop reason: HOSPADM

## 2024-05-09 RX ORDER — BUMETANIDE 0.25 MG/ML
2 INJECTION INTRAMUSCULAR; INTRAVENOUS ONCE
Status: COMPLETED | OUTPATIENT
Start: 2024-05-09 | End: 2024-05-10

## 2024-05-09 RX ORDER — LANOLIN ALCOHOL/MO/W.PET/CERES
6 CREAM (GRAM) TOPICAL NIGHTLY PRN
Status: DISCONTINUED | OUTPATIENT
Start: 2024-05-09 | End: 2024-05-14 | Stop reason: HOSPADM

## 2024-05-09 RX ORDER — NITROGLYCERIN 0.4 MG/1
0.4 TABLET SUBLINGUAL EVERY 5 MIN PRN
Status: DISCONTINUED | OUTPATIENT
Start: 2024-05-09 | End: 2024-05-14 | Stop reason: HOSPADM

## 2024-05-09 RX ORDER — SEVELAMER CARBONATE 800 MG/1
800 TABLET, FILM COATED ORAL
Status: DISCONTINUED | OUTPATIENT
Start: 2024-05-10 | End: 2024-05-14 | Stop reason: HOSPADM

## 2024-05-09 ASSESSMENT — PAIN DESCRIPTION - LOCATION
LOCATION: CHEST
LOCATION: BACK;KNEE;LEG

## 2024-05-09 ASSESSMENT — PAIN - FUNCTIONAL ASSESSMENT
PAIN_FUNCTIONAL_ASSESSMENT: NONE - DENIES PAIN
PAIN_FUNCTIONAL_ASSESSMENT: 0-10

## 2024-05-09 ASSESSMENT — PAIN SCALES - GENERAL
PAINLEVEL_OUTOF10: 9
PAINLEVEL_OUTOF10: 5

## 2024-05-09 ASSESSMENT — PAIN DESCRIPTION - ORIENTATION: ORIENTATION: LOWER;RIGHT;LEFT

## 2024-05-10 ENCOUNTER — HOSPITAL ENCOUNTER (INPATIENT)
Age: 64
Discharge: HOME | DRG: 321 | End: 2024-05-12
Attending: INTERNAL MEDICINE
Payer: MEDICARE

## 2024-05-10 ENCOUNTER — APPOINTMENT (OUTPATIENT)
Dept: NUCLEAR MEDICINE | Age: 64
DRG: 321 | End: 2024-05-10
Attending: INTERNAL MEDICINE
Payer: MEDICARE

## 2024-05-10 ENCOUNTER — APPOINTMENT (OUTPATIENT)
Age: 64
DRG: 321 | End: 2024-05-10
Payer: MEDICARE

## 2024-05-10 PROBLEM — I50.32 CHF (CONGESTIVE HEART FAILURE), NYHA CLASS II, CHRONIC, DIASTOLIC (HCC): Status: RESOLVED | Noted: 2022-07-21 | Resolved: 2024-05-10

## 2024-05-10 PROBLEM — J81.0 ACUTE PULMONARY EDEMA (HCC): Status: ACTIVE | Noted: 2024-05-10

## 2024-05-10 PROBLEM — N17.0 ATN (ACUTE TUBULAR NECROSIS) (HCC): Status: RESOLVED | Noted: 2022-08-10 | Resolved: 2024-05-10

## 2024-05-10 PROBLEM — R53.1 GENERALIZED WEAKNESS: Status: RESOLVED | Noted: 2023-03-08 | Resolved: 2024-05-10

## 2024-05-10 PROBLEM — I50.43 CHF (CONGESTIVE HEART FAILURE), NYHA CLASS III, ACUTE ON CHRONIC, COMBINED (HCC): Status: RESOLVED | Noted: 2022-07-19 | Resolved: 2024-05-10

## 2024-05-10 PROBLEM — N18.4 STAGE 4 CHRONIC KIDNEY DISEASE (HCC): Status: RESOLVED | Noted: 2022-12-27 | Resolved: 2024-05-10

## 2024-05-10 LAB
ANION GAP SERPL CALC-SCNC: 16 MEQ/L (ref 8–16)
ARTERIAL PATENCY WRIST A: POSITIVE
BASE EXCESS BLDA CALC-SCNC: 2.6 MMOL/L (ref -2.5–2.5)
BASOPHILS ABSOLUTE: 0.1 THOU/MM3 (ref 0–0.1)
BASOPHILS NFR BLD AUTO: 0.8 %
BDY SITE: ABNORMAL
BUN SERPL-MCNC: 45 MG/DL (ref 7–22)
CALCIUM SERPL-MCNC: 8.6 MG/DL (ref 8.5–10.5)
CHLORIDE SERPL-SCNC: 98 MEQ/L (ref 98–111)
CO2 SERPL-SCNC: 24 MEQ/L (ref 23–33)
COLLECTED BY:: ABNORMAL
CREAT SERPL-MCNC: 5.3 MG/DL (ref 0.4–1.2)
DEPRECATED RDW RBC AUTO: 49.5 FL (ref 35–45)
DEVICE: ABNORMAL
ECHO AO ASC DIAM: 3.7 CM
ECHO AO ASCENDING AORTA INDEX: 1.47 CM/M2
ECHO AV CUSP MM: 1.8 CM
ECHO AV PEAK GRADIENT: 11 MMHG
ECHO AV PEAK VELOCITY: 1.7 M/S
ECHO AV VELOCITY RATIO: 0.76
ECHO BSA: 2.66 M2
ECHO BSA: 2.66 M2
ECHO EST RA PRESSURE: 7 MMHG
ECHO LA AREA 2C: 31.4 CM2
ECHO LA AREA 4C: 29.9 CM2
ECHO LA DIAMETER INDEX: 1.63 CM/M2
ECHO LA DIAMETER: 4.1 CM
ECHO LA MAJOR AXIS: 6.9 CM
ECHO LA MINOR AXIS: 7.1 CM
ECHO LA VOL BP: 111 ML (ref 18–58)
ECHO LA VOL MOD A2C: 112 ML (ref 18–58)
ECHO LA VOL MOD A4C: 108 ML (ref 18–58)
ECHO LA VOL/BSA BIPLANE: 44 ML/M2 (ref 16–34)
ECHO LA VOLUME INDEX MOD A2C: 44 ML/M2 (ref 16–34)
ECHO LA VOLUME INDEX MOD A4C: 43 ML/M2 (ref 16–34)
ECHO LV E' LATERAL VELOCITY: 9 CM/S
ECHO LV E' SEPTAL VELOCITY: 5 CM/S
ECHO LV EDV A2C: 164 ML
ECHO LV EDV A4C: 223 ML
ECHO LV EDV INDEX A4C: 88 ML/M2
ECHO LV EDV NDEX A2C: 65 ML/M2
ECHO LV EJECTION FRACTION A2C: 45 %
ECHO LV EJECTION FRACTION A4C: 46 %
ECHO LV EJECTION FRACTION BIPLANE: 45 % (ref 55–100)
ECHO LV ESV A2C: 90 ML
ECHO LV ESV A4C: 120 ML
ECHO LV ESV INDEX A2C: 36 ML/M2
ECHO LV ESV INDEX A4C: 48 ML/M2
ECHO LV FRACTIONAL SHORTENING: 23 % (ref 28–44)
ECHO LV INTERNAL DIMENSION DIASTOLE INDEX: 2.38 CM/M2
ECHO LV INTERNAL DIMENSION DIASTOLIC: 6 CM (ref 4.2–5.9)
ECHO LV INTERNAL DIMENSION SYSTOLIC INDEX: 1.83 CM/M2
ECHO LV INTERNAL DIMENSION SYSTOLIC: 4.6 CM
ECHO LV ISOVOLUMETRIC RELAXATION TIME (IVRT): 74 MS
ECHO LV IVSD: 0.9 CM (ref 0.6–1)
ECHO LV MASS 2D: 215.7 G (ref 88–224)
ECHO LV MASS INDEX 2D: 85.6 G/M2 (ref 49–115)
ECHO LV POSTERIOR WALL DIASTOLIC: 0.9 CM (ref 0.6–1)
ECHO LV RELATIVE WALL THICKNESS RATIO: 0.3
ECHO LVOT PEAK GRADIENT: 7 MMHG
ECHO LVOT PEAK VELOCITY: 1.3 M/S
ECHO MV A VELOCITY: 0.62 M/S
ECHO MV E DECELERATION TIME (DT): 254 MS
ECHO MV E VELOCITY: 1.08 M/S
ECHO MV E/A RATIO: 1.74
ECHO MV E/E' LATERAL: 12
ECHO MV E/E' RATIO (AVERAGED): 16.8
ECHO MV E/E' SEPTAL: 21.6
ECHO MV REGURGITANT PEAK GRADIENT: 92 MMHG
ECHO MV REGURGITANT PEAK VELOCITY: 4.8 M/S
ECHO PV MAX VELOCITY: 0.8 M/S
ECHO PV PEAK GRADIENT: 2 MMHG
ECHO RIGHT VENTRICULAR SYSTOLIC PRESSURE (RVSP): 52 MMHG
ECHO RV INTERNAL DIMENSION: 3.2 CM
ECHO RV TAPSE: 2.6 CM (ref 1.7–?)
ECHO TV E WAVE: 0.8 M/S
ECHO TV REGURGITANT MAX VELOCITY: 3.35 M/S
ECHO TV REGURGITANT PEAK GRADIENT: 45 MMHG
EKG ATRIAL RATE: 58 BPM
EKG ATRIAL RATE: 59 BPM
EKG ATRIAL RATE: 59 BPM
EKG ATRIAL RATE: 65 BPM
EKG P AXIS: 0 DEGREES
EKG P AXIS: 103 DEGREES
EKG P AXIS: 63 DEGREES
EKG P AXIS: 98 DEGREES
EKG P-R INTERVAL: 184 MS
EKG P-R INTERVAL: 194 MS
EKG P-R INTERVAL: 206 MS
EKG P-R INTERVAL: 206 MS
EKG Q-T INTERVAL: 478 MS
EKG Q-T INTERVAL: 498 MS
EKG Q-T INTERVAL: 504 MS
EKG Q-T INTERVAL: 514 MS
EKG QRS DURATION: 164 MS
EKG QRS DURATION: 164 MS
EKG QRS DURATION: 172 MS
EKG QRS DURATION: 178 MS
EKG QTC CALCULATION (BAZETT): 493 MS
EKG QTC CALCULATION (BAZETT): 497 MS
EKG QTC CALCULATION (BAZETT): 498 MS
EKG QTC CALCULATION (BAZETT): 504 MS
EKG R AXIS: 24 DEGREES
EKG R AXIS: 39 DEGREES
EKG R AXIS: 7 DEGREES
EKG R AXIS: 9 DEGREES
EKG T AXIS: -2 DEGREES
EKG T AXIS: -5 DEGREES
EKG T AXIS: -8 DEGREES
EKG T AXIS: -9 DEGREES
EKG VENTRICULAR RATE: 58 BPM
EKG VENTRICULAR RATE: 59 BPM
EKG VENTRICULAR RATE: 59 BPM
EKG VENTRICULAR RATE: 65 BPM
EOSINOPHIL NFR BLD AUTO: 3.1 %
EOSINOPHILS ABSOLUTE: 0.2 THOU/MM3 (ref 0–0.4)
ERYTHROCYTE [DISTWIDTH] IN BLOOD BY AUTOMATED COUNT: 14.2 % (ref 11.5–14.5)
FIO2 ON VENT O2 ANALYZER: 32 %
GFR SERPL CREATININE-BSD FRML MDRD: 12 ML/MIN/1.73M2
GLUCOSE SERPL-MCNC: 88 MG/DL (ref 70–108)
HBV CORE IGM SERPL QL IA: NEGATIVE
HBV SURFACE AB SER QL IA: POSITIVE
HBV SURFACE AG SERPL QL IA: NORMAL
HCO3 BLDA-SCNC: 28 MMOL/L (ref 23–28)
HCT VFR BLD AUTO: 28.2 % (ref 42–52)
HCT VFR BLD AUTO: 32.1 % (ref 42–52)
HGB BLD-MCNC: 10.5 GM/DL (ref 14–18)
HGB BLD-MCNC: 9.4 GM/DL (ref 14–18)
HGB RETIC QN AUTO: 33.3 PG (ref 28.2–35.7)
IMM GRANULOCYTES # BLD AUTO: 0.02 THOU/MM3 (ref 0–0.07)
IMM GRANULOCYTES NFR BLD AUTO: 0.3 %
IMM RETICS NFR: 15.1 % (ref 2.3–13.4)
LDH SERPL L TO P-CCNC: 146 U/L (ref 100–190)
LYMPHOCYTES ABSOLUTE: 0.6 THOU/MM3 (ref 1–4.8)
LYMPHOCYTES NFR BLD AUTO: 9.5 %
MCH RBC QN AUTO: 32 PG (ref 26–33)
MCHC RBC AUTO-ENTMCNC: 33.3 GM/DL (ref 32.2–35.5)
MCV RBC AUTO: 95.9 FL (ref 80–94)
MONOCYTES ABSOLUTE: 0.8 THOU/MM3 (ref 0.4–1.3)
MONOCYTES NFR BLD AUTO: 11.8 %
NEUTROPHILS ABSOLUTE: 4.9 THOU/MM3 (ref 1.8–7.7)
NEUTROPHILS NFR BLD AUTO: 74.5 %
NRBC BLD AUTO-RTO: 0 /100 WBC
NUC STRESS EJECTION FRACTION: 39 %
PCO2 BLDA: 47 MMHG (ref 35–45)
PH BLDA: 7.39 [PH] (ref 7.35–7.45)
PLATELET # BLD AUTO: 191 THOU/MM3 (ref 130–400)
PMV BLD AUTO: 9.4 FL (ref 9.4–12.4)
PO2 BLDA: 81 MMHG (ref 71–104)
POTASSIUM SERPL-SCNC: 3.5 MEQ/L (ref 3.5–5.2)
POTASSIUM SERPL-SCNC: 3.6 MEQ/L (ref 3.5–5.2)
RBC # BLD AUTO: 2.94 MILL/MM3 (ref 4.7–6.1)
RETICS # AUTO: 77 THOU/MM3 (ref 20–115)
RETICS/RBC NFR AUTO: 2.3 % (ref 0.5–2)
SAO2 % BLDA: 96 %
SODIUM SERPL-SCNC: 138 MEQ/L (ref 135–145)
STRESS BASELINE DIAS BP: 72 MMHG
STRESS BASELINE HR: 58 BPM
STRESS BASELINE SYS BP: 151 MMHG
STRESS STAGE 1 BP: NORMAL MMHG
STRESS STAGE 1 DURATION: 1 MIN:SEC
STRESS STAGE 1 HR: 67 BPM
STRESS STAGE 2 BP: NORMAL MMHG
STRESS STAGE 2 DURATION: 1 MIN:SEC
STRESS STAGE 2 HR: 66 BPM
STRESS STAGE 3 BP: NORMAL MMHG
STRESS STAGE 3 DURATION: 1 MIN:SEC
STRESS STAGE 3 HR: 69 BPM
STRESS STAGE RECOVERY 1 BP: NORMAL MMHG
STRESS STAGE RECOVERY 1 DURATION: 1 MIN:SEC
STRESS STAGE RECOVERY 1 HR: 69 BPM
STRESS STAGE RECOVERY 2 BP: NORMAL MMHG
STRESS STAGE RECOVERY 2 DURATION: 1 MIN:SEC
STRESS STAGE RECOVERY 2 HR: 67 BPM
STRESS STAGE RECOVERY 3 BP: NORMAL MMHG
STRESS STAGE RECOVERY 3 DURATION: 1 MIN:SEC
STRESS STAGE RECOVERY 3 HR: 68 BPM
STRESS STAGE RECOVERY 4 BP: NORMAL MMHG
STRESS STAGE RECOVERY 4 DURATION: 1 MIN:SEC
STRESS STAGE RECOVERY 4 HR: 67 BPM
STRESS TARGET HR: 159 BPM
TID: 1.03
TROPONIN, HIGH SENSITIVITY: 62 NG/L (ref 0–12)
WBC # BLD AUTO: 6.6 THOU/MM3 (ref 4.8–10.8)

## 2024-05-10 PROCEDURE — 36600 WITHDRAWAL OF ARTERIAL BLOOD: CPT

## 2024-05-10 PROCEDURE — A9500 TC99M SESTAMIBI: HCPCS | Performed by: INTERNAL MEDICINE

## 2024-05-10 PROCEDURE — 85046 RETICYTE/HGB CONCENTRATE: CPT

## 2024-05-10 PROCEDURE — 36415 COLL VENOUS BLD VENIPUNCTURE: CPT

## 2024-05-10 PROCEDURE — 85014 HEMATOCRIT: CPT

## 2024-05-10 PROCEDURE — 83615 LACTATE (LD) (LDH) ENZYME: CPT

## 2024-05-10 PROCEDURE — 93005 ELECTROCARDIOGRAM TRACING: CPT | Performed by: INTERNAL MEDICINE

## 2024-05-10 PROCEDURE — 86705 HEP B CORE ANTIBODY IGM: CPT

## 2024-05-10 PROCEDURE — 78452 HT MUSCLE IMAGE SPECT MULT: CPT

## 2024-05-10 PROCEDURE — 84484 ASSAY OF TROPONIN QUANT: CPT

## 2024-05-10 PROCEDURE — 93005 ELECTROCARDIOGRAM TRACING: CPT | Performed by: NURSE PRACTITIONER

## 2024-05-10 PROCEDURE — 82803 BLOOD GASES ANY COMBINATION: CPT

## 2024-05-10 PROCEDURE — 85025 COMPLETE CBC W/AUTO DIFF WBC: CPT

## 2024-05-10 PROCEDURE — 78452 HT MUSCLE IMAGE SPECT MULT: CPT | Performed by: INTERNAL MEDICINE

## 2024-05-10 PROCEDURE — 90935 HEMODIALYSIS ONE EVALUATION: CPT

## 2024-05-10 PROCEDURE — 6370000000 HC RX 637 (ALT 250 FOR IP): Performed by: NURSE PRACTITIONER

## 2024-05-10 PROCEDURE — 6360000002 HC RX W HCPCS: Performed by: NURSE PRACTITIONER

## 2024-05-10 PROCEDURE — 2580000003 HC RX 258: Performed by: NURSE PRACTITIONER

## 2024-05-10 PROCEDURE — 6360000002 HC RX W HCPCS: Performed by: INTERNAL MEDICINE

## 2024-05-10 PROCEDURE — 93010 ELECTROCARDIOGRAM REPORT: CPT | Performed by: INTERNAL MEDICINE

## 2024-05-10 PROCEDURE — 80048 BASIC METABOLIC PNL TOTAL CA: CPT

## 2024-05-10 PROCEDURE — 86706 HEP B SURFACE ANTIBODY: CPT

## 2024-05-10 PROCEDURE — 93306 TTE W/DOPPLER COMPLETE: CPT | Performed by: INTERNAL MEDICINE

## 2024-05-10 PROCEDURE — 93017 CV STRESS TEST TRACING ONLY: CPT

## 2024-05-10 PROCEDURE — 99222 1ST HOSP IP/OBS MODERATE 55: CPT | Performed by: INTERNAL MEDICINE

## 2024-05-10 PROCEDURE — 1200000003 HC TELEMETRY R&B

## 2024-05-10 PROCEDURE — 2700000000 HC OXYGEN THERAPY PER DAY

## 2024-05-10 PROCEDURE — 99223 1ST HOSP IP/OBS HIGH 75: CPT | Performed by: INTERNAL MEDICINE

## 2024-05-10 PROCEDURE — 87340 HEPATITIS B SURFACE AG IA: CPT

## 2024-05-10 PROCEDURE — 5A1D70Z PERFORMANCE OF URINARY FILTRATION, INTERMITTENT, LESS THAN 6 HOURS PER DAY: ICD-10-PCS | Performed by: INTERNAL MEDICINE

## 2024-05-10 PROCEDURE — 85018 HEMOGLOBIN: CPT

## 2024-05-10 PROCEDURE — 93016 CV STRESS TEST SUPVJ ONLY: CPT | Performed by: INTERNAL MEDICINE

## 2024-05-10 PROCEDURE — 93306 TTE W/DOPPLER COMPLETE: CPT

## 2024-05-10 PROCEDURE — 93018 CV STRESS TEST I&R ONLY: CPT | Performed by: INTERNAL MEDICINE

## 2024-05-10 PROCEDURE — 84132 ASSAY OF SERUM POTASSIUM: CPT

## 2024-05-10 PROCEDURE — 3430000000 HC RX DIAGNOSTIC RADIOPHARMACEUTICAL: Performed by: INTERNAL MEDICINE

## 2024-05-10 RX ORDER — REGADENOSON 0.08 MG/ML
0.4 INJECTION, SOLUTION INTRAVENOUS
Status: COMPLETED | OUTPATIENT
Start: 2024-05-10 | End: 2024-05-10

## 2024-05-10 RX ORDER — ACETAMINOPHEN 650 MG/1
650 SUPPOSITORY RECTAL EVERY 6 HOURS PRN
Status: DISCONTINUED | OUTPATIENT
Start: 2024-05-10 | End: 2024-05-13 | Stop reason: ALTCHOICE

## 2024-05-10 RX ORDER — SODIUM CHLORIDE 9 MG/ML
INJECTION, SOLUTION INTRAVENOUS PRN
Status: DISCONTINUED | OUTPATIENT
Start: 2024-05-10 | End: 2024-05-14 | Stop reason: HOSPADM

## 2024-05-10 RX ORDER — ONDANSETRON 4 MG/1
4 TABLET, ORALLY DISINTEGRATING ORAL EVERY 8 HOURS PRN
Status: DISCONTINUED | OUTPATIENT
Start: 2024-05-10 | End: 2024-05-14 | Stop reason: HOSPADM

## 2024-05-10 RX ORDER — NITROGLYCERIN 20 MG/100ML
5-200 INJECTION INTRAVENOUS CONTINUOUS
Status: DISCONTINUED | OUTPATIENT
Start: 2024-05-10 | End: 2024-05-14 | Stop reason: HOSPADM

## 2024-05-10 RX ORDER — SODIUM CHLORIDE 9 MG/ML
500 INJECTION, SOLUTION INTRAVENOUS CONTINUOUS PRN
Status: ACTIVE | OUTPATIENT
Start: 2024-05-10 | End: 2024-05-10

## 2024-05-10 RX ORDER — BISACODYL 5 MG/1
5 TABLET, DELAYED RELEASE ORAL DAILY PRN
Status: DISCONTINUED | OUTPATIENT
Start: 2024-05-10 | End: 2024-05-14 | Stop reason: HOSPADM

## 2024-05-10 RX ORDER — ONDANSETRON 2 MG/ML
4 INJECTION INTRAMUSCULAR; INTRAVENOUS EVERY 6 HOURS PRN
Status: DISCONTINUED | OUTPATIENT
Start: 2024-05-10 | End: 2024-05-14 | Stop reason: HOSPADM

## 2024-05-10 RX ORDER — TETRAKIS(2-METHOXYISOBUTYLISOCYANIDE)COPPER(I) TETRAFLUOROBORATE 1 MG/ML
35 INJECTION, POWDER, LYOPHILIZED, FOR SOLUTION INTRAVENOUS
Status: COMPLETED | OUTPATIENT
Start: 2024-05-10 | End: 2024-05-10

## 2024-05-10 RX ORDER — SODIUM CHLORIDE 0.9 % (FLUSH) 0.9 %
5-40 SYRINGE (ML) INJECTION PRN
Status: DISCONTINUED | OUTPATIENT
Start: 2024-05-10 | End: 2024-05-14 | Stop reason: HOSPADM

## 2024-05-10 RX ORDER — SODIUM CHLORIDE 0.9 % (FLUSH) 0.9 %
5-40 SYRINGE (ML) INJECTION EVERY 12 HOURS SCHEDULED
Status: DISCONTINUED | OUTPATIENT
Start: 2024-05-10 | End: 2024-05-14 | Stop reason: HOSPADM

## 2024-05-10 RX ORDER — ALBUTEROL SULFATE 90 UG/1
2 AEROSOL, METERED RESPIRATORY (INHALATION) PRN
Status: ACTIVE | OUTPATIENT
Start: 2024-05-10 | End: 2024-05-10

## 2024-05-10 RX ORDER — NITROGLYCERIN 0.4 MG/1
0.4 TABLET SUBLINGUAL EVERY 5 MIN PRN
Status: ACTIVE | OUTPATIENT
Start: 2024-05-10 | End: 2024-05-10

## 2024-05-10 RX ORDER — GABAPENTIN 300 MG/1
300 CAPSULE ORAL NIGHTLY
Status: DISCONTINUED | OUTPATIENT
Start: 2024-05-10 | End: 2024-05-14 | Stop reason: HOSPADM

## 2024-05-10 RX ORDER — ATROPINE SULFATE 0.1 MG/ML
0.5 INJECTION INTRAVENOUS EVERY 5 MIN PRN
Status: ACTIVE | OUTPATIENT
Start: 2024-05-10 | End: 2024-05-10

## 2024-05-10 RX ORDER — SODIUM CHLORIDE 0.9 % (FLUSH) 0.9 %
5-40 SYRINGE (ML) INJECTION PRN
Status: ACTIVE | OUTPATIENT
Start: 2024-05-10 | End: 2024-05-10

## 2024-05-10 RX ORDER — ACETAMINOPHEN 325 MG/1
650 TABLET ORAL EVERY 6 HOURS PRN
Status: DISCONTINUED | OUTPATIENT
Start: 2024-05-10 | End: 2024-05-13 | Stop reason: ALTCHOICE

## 2024-05-10 RX ORDER — SENNOSIDES A AND B 8.6 MG/1
1 TABLET, FILM COATED ORAL NIGHTLY
Status: DISCONTINUED | OUTPATIENT
Start: 2024-05-10 | End: 2024-05-14 | Stop reason: HOSPADM

## 2024-05-10 RX ORDER — AMINOPHYLLINE 25 MG/ML
50 INJECTION, SOLUTION INTRAVENOUS PRN
Status: ACTIVE | OUTPATIENT
Start: 2024-05-10 | End: 2024-05-10

## 2024-05-10 RX ORDER — TETRAKIS(2-METHOXYISOBUTYLISOCYANIDE)COPPER(I) TETRAFLUOROBORATE 1 MG/ML
9.6 INJECTION, POWDER, LYOPHILIZED, FOR SOLUTION INTRAVENOUS
Status: COMPLETED | OUTPATIENT
Start: 2024-05-10 | End: 2024-05-10

## 2024-05-10 RX ORDER — DOCUSATE SODIUM 100 MG/1
100 CAPSULE, LIQUID FILLED ORAL 2 TIMES DAILY PRN
Status: DISCONTINUED | OUTPATIENT
Start: 2024-05-10 | End: 2024-05-14 | Stop reason: HOSPADM

## 2024-05-10 RX ORDER — REGADENOSON 0.08 MG/ML
0.4 INJECTION, SOLUTION INTRAVENOUS
Status: DISCONTINUED | OUTPATIENT
Start: 2024-05-10 | End: 2024-05-14 | Stop reason: HOSPADM

## 2024-05-10 RX ORDER — POLYETHYLENE GLYCOL 3350 17 G/17G
17 POWDER, FOR SOLUTION ORAL DAILY PRN
Status: DISCONTINUED | OUTPATIENT
Start: 2024-05-10 | End: 2024-05-14 | Stop reason: HOSPADM

## 2024-05-10 RX ORDER — METOPROLOL TARTRATE 1 MG/ML
5 INJECTION, SOLUTION INTRAVENOUS EVERY 5 MIN PRN
Status: ACTIVE | OUTPATIENT
Start: 2024-05-10 | End: 2024-05-10

## 2024-05-10 RX ADMIN — ROPINIROLE HYDROCHLORIDE 1 MG: 1 TABLET, FILM COATED ORAL at 20:53

## 2024-05-10 RX ADMIN — CARVEDILOL 25 MG: 25 TABLET, FILM COATED ORAL at 17:53

## 2024-05-10 RX ADMIN — SEVELAMER CARBONATE 800 MG: 800 TABLET, FILM COATED ORAL at 17:53

## 2024-05-10 RX ADMIN — SODIUM CHLORIDE, PRESERVATIVE FREE 10 ML: 5 INJECTION INTRAVENOUS at 20:53

## 2024-05-10 RX ADMIN — Medication 9.6 MILLICURIE: at 13:30

## 2024-05-10 RX ADMIN — GABAPENTIN 300 MG: 300 CAPSULE ORAL at 20:53

## 2024-05-10 RX ADMIN — PANTOPRAZOLE SODIUM 40 MG: 40 TABLET, DELAYED RELEASE ORAL at 04:02

## 2024-05-10 RX ADMIN — NITROGLYCERIN 5 MCG/MIN: 20 INJECTION INTRAVENOUS at 22:21

## 2024-05-10 RX ADMIN — TAMSULOSIN HYDROCHLORIDE 0.4 MG: 0.4 CAPSULE ORAL at 00:18

## 2024-05-10 RX ADMIN — MONTELUKAST SODIUM 10 MG: 10 TABLET ORAL at 00:18

## 2024-05-10 RX ADMIN — PRIMIDONE 50 MG: 50 TABLET ORAL at 20:52

## 2024-05-10 RX ADMIN — Medication 1 TABLET: at 20:53

## 2024-05-10 RX ADMIN — PRIMIDONE 50 MG: 50 TABLET ORAL at 00:18

## 2024-05-10 RX ADMIN — REGADENOSON 0.4 MG: 0.08 INJECTION, SOLUTION INTRAVENOUS at 14:35

## 2024-05-10 RX ADMIN — Medication 1 TABLET: at 00:18

## 2024-05-10 RX ADMIN — LEVOTHYROXINE SODIUM 175 MCG: 0.15 TABLET ORAL at 04:01

## 2024-05-10 RX ADMIN — GABAPENTIN 600 MG: 300 CAPSULE ORAL at 00:18

## 2024-05-10 RX ADMIN — MONTELUKAST SODIUM 10 MG: 10 TABLET ORAL at 20:53

## 2024-05-10 RX ADMIN — DOCUSATE SODIUM 100 MG: 100 CAPSULE, LIQUID FILLED ORAL at 03:57

## 2024-05-10 RX ADMIN — NITROGLYCERIN 0.4 MG: 0.4 TABLET, ORALLY DISINTEGRATING SUBLINGUAL at 03:38

## 2024-05-10 RX ADMIN — NITROGLYCERIN 0.4 MG: 0.4 TABLET, ORALLY DISINTEGRATING SUBLINGUAL at 21:25

## 2024-05-10 RX ADMIN — ROPINIROLE HYDROCHLORIDE 1 MG: 1 TABLET, FILM COATED ORAL at 00:18

## 2024-05-10 RX ADMIN — Medication 6 MG: at 01:24

## 2024-05-10 RX ADMIN — Medication 35 MILLICURIE: at 14:34

## 2024-05-10 RX ADMIN — NITROGLYCERIN 5 MCG/MIN: 20 INJECTION INTRAVENOUS at 06:10

## 2024-05-10 RX ADMIN — NITROGLYCERIN 0.4 MG: 0.4 TABLET, ORALLY DISINTEGRATING SUBLINGUAL at 03:54

## 2024-05-10 RX ADMIN — SENNOSIDES 8.6 MG: 8.6 TABLET, FILM COATED ORAL at 20:53

## 2024-05-10 RX ADMIN — OXYCODONE AND ACETAMINOPHEN 1 TABLET: 10; 325 TABLET ORAL at 00:05

## 2024-05-10 RX ADMIN — Medication 6 MG: at 20:52

## 2024-05-10 RX ADMIN — BISACODYL 5 MG: 5 TABLET, COATED ORAL at 03:57

## 2024-05-10 RX ADMIN — POLYETHYLENE GLYCOL 3350 17 G: 17 POWDER, FOR SOLUTION ORAL at 03:57

## 2024-05-10 RX ADMIN — TAMSULOSIN HYDROCHLORIDE 0.4 MG: 0.4 CAPSULE ORAL at 20:52

## 2024-05-10 RX ADMIN — BUMETANIDE 2 MG: 0.25 INJECTION INTRAMUSCULAR; INTRAVENOUS at 00:07

## 2024-05-10 RX ADMIN — OXYCODONE AND ACETAMINOPHEN 1 TABLET: 10; 325 TABLET ORAL at 17:53

## 2024-05-10 ASSESSMENT — PAIN DESCRIPTION - LOCATION
LOCATION: BACK;LEG;FOOT
LOCATION: CHEST
LOCATION: CHEST
LOCATION: BACK
LOCATION: BACK
LOCATION: CHEST
LOCATION: CHEST

## 2024-05-10 ASSESSMENT — PAIN DESCRIPTION - DESCRIPTORS
DESCRIPTORS: THROBBING;SQUEEZING

## 2024-05-10 ASSESSMENT — PAIN DESCRIPTION - PAIN TYPE
TYPE: CHRONIC PAIN
TYPE: CHRONIC PAIN

## 2024-05-10 ASSESSMENT — PAIN SCALES - GENERAL
PAINLEVEL_OUTOF10: 4
PAINLEVEL_OUTOF10: 4
PAINLEVEL_OUTOF10: 0
PAINLEVEL_OUTOF10: 0
PAINLEVEL_OUTOF10: 9
PAINLEVEL_OUTOF10: 10
PAINLEVEL_OUTOF10: 6
PAINLEVEL_OUTOF10: 3
PAINLEVEL_OUTOF10: 9
PAINLEVEL_OUTOF10: 2
PAINLEVEL_OUTOF10: 0

## 2024-05-10 ASSESSMENT — PAIN DESCRIPTION - ORIENTATION
ORIENTATION: LOWER
ORIENTATION: LOWER;RIGHT
ORIENTATION: LOWER

## 2024-05-10 NOTE — FLOWSHEET NOTE
Stable 4 hour TX completed. Nitro Drip infusing @ 5mcg during Dialysis. No c/o chest pain during dialysis. Removed 3.5 L of fluid as ordered. pt tolerated fluid removal well. Pressure held to each needle site x 10 min. Drsg clean, dry, and intact upon leaving unit. TX record printed for scanning into EMR. Report called to primary RN.    05/10/24 0745 05/10/24 1222   Vital Signs   BP (!) 154/79 (!) 148/61   Temp 97.9 °F (36.6 °C) 98 °F (36.7 °C)   Pulse 58 55   Respirations 20 16   SpO2 98 % 99 %   Weight - Scale 135.2 kg (298 lb 1 oz) 131.7 kg (290 lb 5.5 oz)   Weight Method Bed scale Bed scale   Percent Weight Change -1.96 -2.59   Pain Assessment   Pain Assessment None - Denies Pain  --    Post-Hemodialysis Assessment   Post-Treatment Procedures  --  Blood returned;Access bleeding time < 10 minutes   Machine Disinfection Process  --  Acid/Vinegar Clean;Heat Disinfect;Exterior Machine Disinfection   Blood Volume Processed (Liters)  --  91.9 L   Dialyzer Clearance  --  Lightly streaked   Duration of Treatment (minutes)  --  240 minutes   Heparin Amount Administered During Treatment (mL)  --  0 mL   Hemodialysis Intake (ml)  --  400 ml   Hemodialysis Output (ml)  --  3900 ml   NET Removed (ml)  --  3500   Tolerated Treatment  --  Good

## 2024-05-10 NOTE — PROGRESS NOTES
Hospitalist Progress Note    Patient:  Farhad Myles      Unit/Bed:8A-04/004-A    YOB: 1960    MRN: [id number]       Acct: [id number]     PCP: No primary care provider on file.    Date of Admission: 5/9/2024    Assessment/Plan:    Acute on chronic respiratory failure with hypoxia: Baseline 2L/NC increased to 3L/NC  Wean O2 as able  Pulmonary Hygiene  Cardiology consulted  TTE pending    Acute on chronic HFmrEF; ECHO 2023 LVEF 45-50%:   Cardiology and nephrology consulted  Daily weights  Strict I&O's  Pro-BNP- 10,598  Continue Bumex, Coreg   TTE pending    Cardiac Palpitations: Reportedly intermittent for the past 2 weeks  Cardiology consulted  Patient taken for stress test    Hemolysis during hemodialysis:  Nephrology consulted, patient to dialysis today    Accelerated hypertension:  NTG gtt started on admission  Cardiology consulted  Patient taken for stress test    ESRD on hemodialysis:  Nephrology consulted for management    LAURIE-Bipap: ok to use home unit.    Hypothyroidism: history TSH/T4 noted. No signs of organ decompensation. Synthroid to continue    Anemia, macrocytic: monitor and trend    Chronic pain: Percocet were continued, OARRS reviewed    RLS: history      LDA: []CVC / []PICC / []Midline / []Martinez / []Drains / []Mediport / []None  Antibiotics: No  Steroids: No  Labs (still needed?): [x]Yes / []No  IVF (still needed?): []Yes / [x]No    Level of care: []Step Down / [x]Med-Surg  Bed Status: [x]Inpatient / []Observation  Telemetry: [x]Yes / []No  PT/OT: [x]Yes / []No    DVT Prophylaxis: [] Lovenox / [] Heparin / [x] SCDs / [] Already on Systemic Anticoagulation / [] None     Disposition:    [x] Home       [] TCU       [] Rehab       [] Psych       [] SNF       [] Long Term Care Facility       [] Other-    Chief Complaint: shortness of breath and cardiac palpitations       Subjective: 63 y.o. male admitted to the hospitalist service for shortness of breath. Patient off the floor x 2

## 2024-05-10 NOTE — PLAN OF CARE
Problem: Pain  Goal: Verbalizes/displays adequate comfort level or baseline comfort level  5/10/2024 1632 by Fili Vang RN  Outcome: Progressing  Flowsheets (Taken 5/10/2024 1306)  Verbalizes/displays adequate comfort level or baseline comfort level: Encourage patient to monitor pain and request assistance  5/10/2024 0243 by Mere Meyer RN  Outcome: Progressing  Flowsheets (Taken 5/9/2024 2300)  Verbalizes/displays adequate comfort level or baseline comfort level: Encourage patient to monitor pain and request assistance     Problem: Discharge Planning  Goal: Discharge to home or other facility with appropriate resources  5/10/2024 1632 by Fili Vang, RN  Outcome: Progressing  Flowsheets (Taken 5/10/2024 1306)  Discharge to home or other facility with appropriate resources: Identify barriers to discharge with patient and caregiver  5/10/2024 0243 by Mere Meyer RN  Outcome: Progressing  Flowsheets (Taken 5/9/2024 2337)  Discharge to home or other facility with appropriate resources: Identify barriers to discharge with patient and caregiver     Problem: ABCDS Injury Assessment  Goal: Absence of physical injury  5/10/2024 0243 by Mere Meyer, RN  Outcome: Progressing     Problem: Cardiovascular - Adult  Goal: Maintains optimal cardiac output and hemodynamic stability  5/10/2024 1632 by Fili Vang RN  Outcome: Progressing  Flowsheets (Taken 5/10/2024 1306)  Maintains optimal cardiac output and hemodynamic stability: Monitor urine output and notify Licensed Independent Practitioner for values outside of normal range  5/10/2024 0243 by Mere Meyer, RN  Outcome: Progressing  Goal: Absence of cardiac dysrhythmias or at baseline  5/10/2024 1632 by Fili Vang RN  Outcome: Progressing  5/10/2024 0243 by Mere Meyer RN  Outcome: Progressing     Problem: Safety - Adult  Goal: Free from fall injury  Outcome: Progressing

## 2024-05-10 NOTE — RT PROTOCOL NOTE
RT Inhaler-Nebulizer Bronchodilator Protocol Note    There is a bronchodilator order in the chart from a provider indicating to follow the RT Bronchodilator Protocol and there is an “Initiate RT Inhaler-Nebulizer Bronchodilator Protocol” order as well (see protocol at bottom of note).    CXR Findings:  No results found.    The findings from the last RT Protocol Assessment were as follows:   History Pulmonary Disease: Chronic pulmonary disease  Respiratory Pattern: Regular pattern and RR 12-20 bpm  Breath Sounds: Clear breath sounds  Cough: Strong, spontaneous, non-productive  Indication for Bronchodilator Therapy: On home bronchodilators (Takes albuterol inhalers as needed at home)  Bronchodilator Assessment Score: 2    Aerosolized bronchodilator medication orders have been revised according to the RT Inhaler-Nebulizer Bronchodilator Protocol below.    Respiratory Therapist to perform RT Therapy Protocol Assessment initially then follow the protocol.  Repeat RT Therapy Protocol Assessment PRN for score 0-3 or on second treatment, BID, and PRN for scores above 3.    No Indications - adjust the frequency to every 6 hours PRN wheezing or bronchospasm, if no treatments needed after 48 hours then discontinue using Per Protocol order mode.     If indication present, adjust the RT bronchodilator orders based on the Bronchodilator Assessment Score as indicated below.  Use Inhaler orders unless patient has one or more of the following: on home nebulizer, not able to hold breath for 10 seconds, is not alert and oriented, cannot activate and use MDI correctly, or respiratory rate 25 breaths per minute or more, then use the equivalent nebulizer order(s) with same Frequency and PRN reasons based on the score.  If a patient is on this medication at home then do not decrease Frequency below that used at home.    0-3 - enter or revise RT bronchodilator order(s) to equivalent RT Bronchodilator order with Frequency of every 4 hours

## 2024-05-10 NOTE — CONSULTS
[facility]    [address], [place] [address]  [phone]     Hospital Consult  5/10/2024 10:56 AM    Pt Name:    Farhad Myles  MRN:     [id number]   [id number]  YOB: 1960  Admit Date:    5/9/2024  7:05 PM  Primary Care Physician:  No primary care provider on file.        Reason for Consult:  ESRD    History:   The patient is a 63 y.o. male seen in real consult for ESRD. He does HHD 4 days a week ( Mon, Tue, Thurs, Fri), sees Dr. Em. Has additional hx of HTN, DM, LAURIE, HFmrEF (45-50%), hx colon resection, and as below.  He presented to the hospital with shortness of breath and heart palpitations.  His dialysis treatment yesterday was terminated early as he had an alarm for hemolysis so he immediately stopped treatment and did not give blood back.  Potassium here normal.  Hemoglobin 9.4. he is on supplemental O2.  CXR showed prominent vasculature and diffuse interstitial opacities.  Cardiology consulted for the chest pain. Nephrology consulted for dialysis management.    Past Medical History:  Past Medical History:   Diagnosis Date    Arthritis     Back problem     back pain-sees [facility] pain mgmt    Bladder disease     Dr. Allison    CHF with unknown LVEF (HCC) 05/24/2021    Dr. George/CHF clinic    Chronic kidney disease     Constipation     History of diabetes mellitus     resolved with weight loss    Hypertension     Hypertensive emergency 04/11/2019    Hypertensive urgency 04/13/2019    Sleep apnea     has bipap-sees AZRA Olmedo CNP    Thyroid disease        Past Surgical History:  Past Surgical History:   Procedure Laterality Date    ABDOMEN SURGERY      ABDOMEN SURGERY N/A 3/25/2022    ABDOMINAL LYSIS OF ADHESIONS, EXPLANT OF INFECTED MESH performed by Patrick Bond MD at [facility] OR    APPENDECTOMY      CARDIAC CATHETERIZATION      no stents    CARPAL TUNNEL RELEASE Bilateral     CIRCUMCISION N/A 3/2/2023    Cystoscopy Circumcision performed by Suresh Gold MD at [facility] 
\"CKMBINDEX\", \"TROPONINI\" in the last 72 hours.  PT/INR: No results for input(s): \"PROTIME\", \"INR\" in the last 72 hours.  APTT: No results for input(s): \"APTT\" in the last 72 hours.  Liver Profile:  Lab Results   Component Value Date/Time    AST 13 05/09/2024 07:19 PM    ALT 11 05/09/2024 07:19 PM    BILIDIR <0.2 04/20/2022 12:50 PM    BILITOT 0.3 05/09/2024 07:19 PM    ALKPHOS 84 05/09/2024 07:19 PM    PROTEIN 7.3 05/09/2024 07:19 PM    PROTEIN 7.0 03/07/2024 04:09 PM     Lab Results   Component Value Date/Time    CHOL 141 08/11/2023 08:35 AM    HDL 34 08/11/2023 08:35 AM    TRIG 232 08/11/2023 08:35 AM     TSH:   Lab Results   Component Value Date/Time    TSH 22.140 05/09/2024 07:19 PM     UA:   Lab Results   Component Value Date/Time    NITRITE negative 04/11/2024 03:13 PM    COLORU yellow 04/11/2024 03:13 PM    COLORU ORANGE 03/08/2023 05:00 AM    PHUR 5.0 04/11/2024 03:13 PM    PHUR 6.5 03/08/2023 05:00 AM    LABCAST 4-8 HYALINE 03/23/2022 04:45 AM    LABCAST NONE SEEN 03/23/2022 04:45 AM    WBCUA 15-25 03/08/2023 05:00 AM    RBCUA  03/08/2023 05:00 AM    MUCUS NONE SEEN 03/02/2021 11:20 AM    YEAST NONE SEEN 03/08/2023 05:00 AM    BACTERIA NONE SEEN 03/08/2023 05:00 AM    CLARITYU cloudy 04/11/2024 03:13 PM    SPECGRAV 1.000 04/11/2024 03:13 PM    LEUKOCYTESUR moderate 04/11/2024 03:13 PM    LEUKOCYTESUR TRACE 03/08/2023 05:00 AM    UROBILINOGEN 0.2 03/08/2023 05:00 AM    BILIRUBINUR negative 04/11/2024 03:13 PM    BLOODU large 04/11/2024 03:13 PM    BLOODU LARGE 03/08/2023 05:00 AM    GLUCOSEU negative 04/11/2024 03:13 PM    GLUCOSEU NEGATIVE 03/08/2023 05:00 AM    AMORPHOUS NONE SEEN 03/02/2021 11:20 AM         Physical Exam:  Vitals:    05/10/24 0600   BP: (!) 147/82   Pulse: 58   Resp:    Temp:    SpO2: 100%      Intake/Output Summary (Last 24 hours) at 5/10/2024 0820  Last data filed at 5/10/2024 0405  Gross per 24 hour   Intake 480 ml   Output 500 ml   Net -20 ml      General:  No acute

## 2024-05-10 NOTE — ED NOTES
ED to inpatient nurses report      Chief Complaint:  Chief Complaint   Patient presents with    Shortness of Breath    Palpitations     Present to ED from: home    MOA:     LOC: alert and orientated to name, place, date  Mobility: Independent  Oxygen Baseline: 3 L NC    Current needs required: 3 L NC     Code Status:   Prior    What abnormal results were found and what did you give/do to treat them? Pulm edema, hypothyroidism  Any procedures or intervention occur? none    Mental Status:  Level of Consciousness: Alert (0)    Psych Assessment:        Vitals:  Patient Vitals for the past 24 hrs:   BP Temp Temp src Pulse Resp SpO2   05/09/24 2120 (!) 141/67 -- -- 58 15 97 %   05/09/24 2106 (!) 142/83 -- -- 60 18 97 %   05/09/24 1918 (!) 163/77 98.2 °F (36.8 °C) Oral 63 18 97 %        LDAs:   Peripheral IV 05/09/24 Right Antecubital (Active)   Site Assessment Clean, dry & intact 05/09/24 2104   Line Status Blood return noted;Flushed 05/09/24 1919       Ambulatory Status:  No data recorded    Diagnosis:  DISPOSITION Admitted 05/09/2024 10:11:28 PM   Final diagnoses:   Acute pulmonary edema (HCC)   Hypothyroidism, unspecified type        Consults:  None     Pain Score:  Pain Assessment  Pain Assessment: 0-10  Pain Level: 5  Pain Location: Chest    C-SSRS:        Sepsis Screening:  Sepsis Risk Score: 0.96    [place] Fall Risk:       Swallow Screening        Preferred Language:   English      ALLERGIES     Azithromycin, Shellfish-derived products, Pcn [penicillins], Succinylcholine, Sulfa antibiotics, Morphine, and Tape [adhesive tape]    SURGICAL HISTORY       Past Surgical History:   Procedure Laterality Date    ABDOMEN SURGERY      ABDOMEN SURGERY N/A 3/25/2022    ABDOMINAL LYSIS OF ADHESIONS, EXPLANT OF INFECTED MESH performed by Patrick Bond MD at [facility] OR    APPENDECTOMY      CARDIAC CATHETERIZATION      no stents    CARPAL TUNNEL RELEASE Bilateral     CIRCUMCISION N/A 3/2/2023    Cystoscopy Circumcision performed

## 2024-05-10 NOTE — PLAN OF CARE
Problem: Pain  Goal: Verbalizes/displays adequate comfort level or baseline comfort level  Outcome: Progressing  Flowsheets (Taken 5/9/2024 2300)  Verbalizes/displays adequate comfort level or baseline comfort level: Encourage patient to monitor pain and request assistance     Problem: Discharge Planning  Goal: Discharge to home or other facility with appropriate resources  Outcome: Progressing  Flowsheets (Taken 5/9/2024 8060)  Discharge to home or other facility with appropriate resources: Identify barriers to discharge with patient and caregiver     Problem: ABCDS Injury Assessment  Goal: Absence of physical injury  Outcome: Progressing     Problem: Cardiovascular - Adult  Goal: Maintains optimal cardiac output and hemodynamic stability  Outcome: Progressing  Goal: Absence of cardiac dysrhythmias or at baseline  Outcome: Progressing

## 2024-05-10 NOTE — PROGRESS NOTES
Pharmacy Renal Adjustment    Pharmacy renally adjusted the following medication(s) per P&T approved policy: gabapentin    Recent Labs     05/09/24  1919 05/10/24  0523   BUN 42* 45*   CREATININE 5.3* 5.3*     Estimated Creatinine Clearance: 21 mL/min (A) (based on SCr of 5.3 mg/dL (HH)).    Assessment:  ESRD on HD    Plan:   Decrease gabapentin from 600 mg daily to 300 mg daily    Please call pharmacy with any questions.    Lorenza Skinner, PharmD  5/10/2024  10:25 AM

## 2024-05-11 PROBLEM — E87.79 OTHER FLUID OVERLOAD: Status: ACTIVE | Noted: 2023-03-24

## 2024-05-11 LAB
EKG ATRIAL RATE: 64 BPM
EKG P AXIS: 30 DEGREES
EKG P-R INTERVAL: 200 MS
EKG Q-T INTERVAL: 482 MS
EKG QRS DURATION: 162 MS
EKG QTC CALCULATION (BAZETT): 497 MS
EKG R AXIS: 36 DEGREES
EKG T AXIS: -10 DEGREES
EKG VENTRICULAR RATE: 64 BPM

## 2024-05-11 PROCEDURE — 5A09357 ASSISTANCE WITH RESPIRATORY VENTILATION, LESS THAN 24 CONSECUTIVE HOURS, CONTINUOUS POSITIVE AIRWAY PRESSURE: ICD-10-PCS | Performed by: STUDENT IN AN ORGANIZED HEALTH CARE EDUCATION/TRAINING PROGRAM

## 2024-05-11 PROCEDURE — 99232 SBSQ HOSP IP/OBS MODERATE 35: CPT | Performed by: INTERNAL MEDICINE

## 2024-05-11 PROCEDURE — 94660 CPAP INITIATION&MGMT: CPT

## 2024-05-11 PROCEDURE — 6370000000 HC RX 637 (ALT 250 FOR IP): Performed by: NURSE PRACTITIONER

## 2024-05-11 PROCEDURE — 6360000002 HC RX W HCPCS: Performed by: NURSE PRACTITIONER

## 2024-05-11 PROCEDURE — 99232 SBSQ HOSP IP/OBS MODERATE 35: CPT | Performed by: PHYSICIAN ASSISTANT

## 2024-05-11 PROCEDURE — 1200000003 HC TELEMETRY R&B

## 2024-05-11 PROCEDURE — 99232 SBSQ HOSP IP/OBS MODERATE 35: CPT | Performed by: STUDENT IN AN ORGANIZED HEALTH CARE EDUCATION/TRAINING PROGRAM

## 2024-05-11 PROCEDURE — 2580000003 HC RX 258: Performed by: NURSE PRACTITIONER

## 2024-05-11 PROCEDURE — 93010 ELECTROCARDIOGRAM REPORT: CPT | Performed by: INTERNAL MEDICINE

## 2024-05-11 RX ADMIN — Medication 1 TABLET: at 22:12

## 2024-05-11 RX ADMIN — PANTOPRAZOLE SODIUM 40 MG: 40 TABLET, DELAYED RELEASE ORAL at 08:15

## 2024-05-11 RX ADMIN — SENNOSIDES 8.6 MG: 8.6 TABLET, FILM COATED ORAL at 22:12

## 2024-05-11 RX ADMIN — SEVELAMER CARBONATE 800 MG: 800 TABLET, FILM COATED ORAL at 12:51

## 2024-05-11 RX ADMIN — CARVEDILOL 25 MG: 25 TABLET, FILM COATED ORAL at 17:53

## 2024-05-11 RX ADMIN — SODIUM CHLORIDE, PRESERVATIVE FREE 10 ML: 5 INJECTION INTRAVENOUS at 22:14

## 2024-05-11 RX ADMIN — ASPIRIN 81 MG: 81 TABLET, COATED ORAL at 08:12

## 2024-05-11 RX ADMIN — PRIMIDONE 50 MG: 50 TABLET ORAL at 22:12

## 2024-05-11 RX ADMIN — LEVOTHYROXINE SODIUM 175 MCG: 0.15 TABLET ORAL at 05:55

## 2024-05-11 RX ADMIN — ROPINIROLE HYDROCHLORIDE 1 MG: 1 TABLET, FILM COATED ORAL at 22:12

## 2024-05-11 RX ADMIN — GABAPENTIN 300 MG: 300 CAPSULE ORAL at 22:12

## 2024-05-11 RX ADMIN — TAMSULOSIN HYDROCHLORIDE 0.4 MG: 0.4 CAPSULE ORAL at 22:12

## 2024-05-11 RX ADMIN — OXYCODONE AND ACETAMINOPHEN 1 TABLET: 10; 325 TABLET ORAL at 17:57

## 2024-05-11 RX ADMIN — CARVEDILOL 25 MG: 25 TABLET, FILM COATED ORAL at 08:12

## 2024-05-11 RX ADMIN — PRIMIDONE 50 MG: 50 TABLET ORAL at 08:12

## 2024-05-11 RX ADMIN — SERTRALINE HYDROCHLORIDE 200 MG: 100 TABLET ORAL at 08:12

## 2024-05-11 RX ADMIN — Medication 6 MG: at 22:17

## 2024-05-11 RX ADMIN — SODIUM CHLORIDE, PRESERVATIVE FREE 10 ML: 5 INJECTION INTRAVENOUS at 08:16

## 2024-05-11 RX ADMIN — OXYCODONE AND ACETAMINOPHEN 1 TABLET: 10; 325 TABLET ORAL at 08:12

## 2024-05-11 RX ADMIN — SEVELAMER CARBONATE 800 MG: 800 TABLET, FILM COATED ORAL at 08:12

## 2024-05-11 RX ADMIN — SEVELAMER CARBONATE 800 MG: 800 TABLET, FILM COATED ORAL at 17:53

## 2024-05-11 RX ADMIN — MONTELUKAST SODIUM 10 MG: 10 TABLET ORAL at 22:12

## 2024-05-11 ASSESSMENT — PAIN DESCRIPTION - ORIENTATION
ORIENTATION: LOWER

## 2024-05-11 ASSESSMENT — PAIN DESCRIPTION - PAIN TYPE: TYPE: CHRONIC PAIN

## 2024-05-11 ASSESSMENT — PAIN DESCRIPTION - ONSET: ONSET: ON-GOING

## 2024-05-11 ASSESSMENT — PAIN DESCRIPTION - DESCRIPTORS
DESCRIPTORS: ACHING;DISCOMFORT

## 2024-05-11 ASSESSMENT — PAIN DESCRIPTION - LOCATION
LOCATION: BACK

## 2024-05-11 ASSESSMENT — PAIN SCALES - GENERAL
PAINLEVEL_OUTOF10: 7
PAINLEVEL_OUTOF10: 6
PAINLEVEL_OUTOF10: 8

## 2024-05-11 NOTE — PROGRESS NOTES
Cardiology Progress Note      Patient:  Farhad Myles  YOB: 1960  MRN: [id number]   Acct: [id number]  Admit Date:  5/9/2024  Primary Cardiologist: Marco George MD  Note per Dr Segura:  \"CHIEF COMPLAINT: blood in dialysate         HPI: This is a pleasant 63 y.o. male presented to the ED with complaints of blood in his dialysate and concerns for hemolysis.  He also complains of heart palpitations, chest pressure, and worsening shortness of breath over the past 2 weeks.  Chest pressure has been occurring up to a couple times per day and is not associated with exercise or worsening shortness of breath.  Chest pressure lasts about 2 minutes at a time and self resolved.  He also complains of palpitations over the past 2 weeks as well.  Palpitations lasted for 20 seconds and self resolved.  He says his blood pressure has also been high for the past 2 weeks. He reports using a home dialysis machine and says he has been missing 1-2 treatments per week because he feels bad after he completes dialysis.Troponins were trended with a flat trend of 63, 59, 62.  proBNP was markedly elevated above baseline at [address], and chest x-ray did show interstitial pulmonary edema.           Echo:TTE 3/24/2023     Summary   Technically difficult study due to poor acoustic windows.   Ejection fraction is visually estimated at 45-50%.   There was mild global hypokinesis of the left ventricle.     Cardiac Cath: 12/1/2020     SUMMARY:  1.  Elevated right heart pressures with elevated LVEPD.  2.  Patent coronaries.     RECOMMENDATIONS:  1.  IV diuresis.  2.  Monitor electrolytes.  3.  Optimize heart failure therapy.  4.  Weight loss advised.  5.  Optimize sleep apnea therapies.  6.  Follow up in clinic in one to two weeks to evaluate symptoms  further.     All labs, EKG's, diagnostic testing and images as well as cardiac cath, stress testing were reviewed during this encounter\"    Subjective (Events in last 24 hours):   Pt awake,

## 2024-05-11 NOTE — PROGRESS NOTES
[facility]   Nephrology progress note  5/11/2024, 12:19 PM      Pt Name:    Farhad Myles  MRN:     [id number]     YOB: 1960  Admit Date:    5/9/2024  7:05 PM    Chief Complaint: Nephrology following for ESRD on hemodialysis.    Subjective:  Patient seen and examined  Had some chest pain on and off  Feels okay did have dialysis yesterday tolerated well    Objective:  24HR INTAKE/OUTPUT:    Intake/Output Summary (Last 24 hours) at 5/11/2024 1219  Last data filed at 5/11/2024 0935  Gross per 24 hour   Intake 1055.44 ml   Output 4700 ml   Net -3644.56 ml      Admission weight: (!) 137.9 kg (304 lb 0.2 oz)  Wt Readings from Last 3 Encounters:   05/11/24 131.9 kg (290 lb 12.6 oz)   04/16/24 (!) 140.2 kg (309 lb 1.4 oz)   04/11/24 (!) 140.2 kg (309 lb)        Vitals :   Vitals:    05/11/24 0434 05/11/24 0545 05/11/24 0757 05/11/24 1021   BP:  (!) 142/69 137/75 (!) 145/77   Pulse:  56 55    Resp: 12 16 17    Temp:  97.3 °F (36.3 °C) 97.2 °F (36.2 °C)    TempSrc:  Axillary Axillary    SpO2:   100%    Weight:  131.9 kg (290 lb 12.6 oz)     Height:           Physical examination  General Appearance:  Well developed. No distress  Mouth/Throat:  Oral mucosa moist  Neck:  Supple, no JVD  Lungs:  Breath sounds: clear  Heart::  S1,S2 heard  Abdomen:  Soft, non - tender  Musculoskeletal:  Edema -no significant edema    Medications:  Infusion:    sodium chloride      nitroGLYCERIN 15 mcg/min (05/11/24 0935)     Meds:    sodium chloride flush  5-40 mL IntraVENous 2 times per day    senna  1 tablet Oral Nightly    gabapentin  300 mg Oral Nightly    aspirin EC  81 mg Oral Daily    carvedilol  25 mg Oral BID WC    levothyroxine  175 mcg Oral Daily    montelukast  10 mg Oral Nightly    multivitamin  1 tablet Oral Nightly    pantoprazole  40 mg Oral QAM AC    primidone  50 mg Oral BID    rOPINIRole  1 mg Oral Nightly    sertraline  200 mg Oral Daily    sevelamer  800 mg Oral TID WC    tamsulosin  0.4

## 2024-05-11 NOTE — PLAN OF CARE
Problem: Pain  Goal: Verbalizes/displays adequate comfort level or baseline comfort level  5/11/2024 1008 by Radhika Castellanos RN  Outcome: Progressing  5/11/2024 0148 by Mere Meyer RN  Outcome: Progressing  Flowsheets (Taken 5/10/2024 1748 by Fili Vang, RN)  Verbalizes/displays adequate comfort level or baseline comfort level: Encourage patient to monitor pain and request assistance     Problem: Discharge Planning  Goal: Discharge to home or other facility with appropriate resources  5/11/2024 1008 by Radhika Castellanos RN  Outcome: Progressing  5/11/2024 0148 by Mere Meyer RN  Outcome: Progressing  Flowsheets (Taken 5/10/2024 2050)  Discharge to home or other facility with appropriate resources: Identify barriers to discharge with patient and caregiver     Problem: ABCDS Injury Assessment  Goal: Absence of physical injury  5/11/2024 1008 by Radhika Castellanos RN  Outcome: Progressing  5/11/2024 0148 by Mere Meyer RN  Outcome: Progressing  Flowsheets (Taken 5/10/2024 2050)  Absence of Physical Injury: Implement safety measures based on patient assessment     Problem: Cardiovascular - Adult  Goal: Maintains optimal cardiac output and hemodynamic stability  5/11/2024 1008 by Radhika Castellanos RN  Outcome: Progressing  5/11/2024 0148 by Mere Meyer RN  Outcome: Progressing  Flowsheets (Taken 5/10/2024 2050)  Maintains optimal cardiac output and hemodynamic stability: Monitor urine output and notify Licensed Independent Practitioner for values outside of normal range  Goal: Absence of cardiac dysrhythmias or at baseline  5/11/2024 1008 by Radhika Castellanos RN  Outcome: Progressing  5/11/2024 0148 by Mere Meyer RN  Outcome: Progressing     Problem: Safety - Adult  Goal: Free from fall injury  5/11/2024 1008 by Radhika Castellanos RN  Outcome: Progressing  5/11/2024 0148 by Mere Meyer RN  Outcome: Progressing

## 2024-05-11 NOTE — PROGRESS NOTES
Hospitalist Progress Note    Patient:  Farhad Myles      Unit/Bed:8A-04/004-A    YOB: 1960    MRN: [id number]       Acct: [id number]     PCP: No primary care provider on file.    Date of Admission: 5/9/2024    Assessment/Plan:    Acute on chronic respiratory failure with hypoxia: Baseline 2L/NC increased to 3L/NC  Wean O2 as able  Pulmonary Hygiene  Cardiology consulted  TTE pending    Acute on chronic HFmrEF; ECHO 2023 LVEF 45-50%:   Cardiology and nephrology consulted  Daily weights  Strict I&O's  Pro-BNP- 10,598  Continue Bumex, Coreg   TTE pending    Cardiac Palpitations: Reportedly intermittent for the past 2 weeks  Cardiology consulted  Patient taken for stress test, will monitor through the weekend with possible LHC on Monday    Hemolysis during hemodialysis:  Nephrology consulted, managing dialysis    Accelerated hypertension:  NTG gtt started on admission, wean per Cardiology  Cardiology consulted  Patient taken for stress test, plan for LHC possibly Monday    ESRD on hemodialysis:  Nephrology consulted for management    LAURIE-Bipap: ok to use home unit.    Hypothyroidism: history TSH/T4 noted. No signs of organ decompensation. Synthroid to continue    Anemia, macrocytic: monitor and trend    Chronic pain: Percocet were continued, OARRS reviewed    RLS: history      LDA: []CVC / []PICC / []Midline / []Martinez / []Drains / []Mediport / []None  Antibiotics: No  Steroids: No  Labs (still needed?): [x]Yes / []No  IVF (still needed?): []Yes / [x]No    Level of care: []Step Down / [x]Med-Surg  Bed Status: [x]Inpatient / []Observation  Telemetry: [x]Yes / []No  PT/OT: [x]Yes / []No    DVT Prophylaxis: [] Lovenox / [] Heparin / [x] SCDs / [] Already on Systemic Anticoagulation / [] None     Disposition:    [x] Home       [] TCU       [] Rehab       [] Psych       [] SNF       [] Long Term Care Facility       [] Other-    Chief Complaint: shortness of breath and cardiac palpitations

## 2024-05-11 NOTE — PLAN OF CARE
Problem: Pain  Goal: Verbalizes/displays adequate comfort level or baseline comfort level  5/11/2024 0148 by Mere Meyer RN  Outcome: Progressing  Flowsheets (Taken 5/10/2024 1748 by Fili Vang RN)  Verbalizes/displays adequate comfort level or baseline comfort level: Encourage patient to monitor pain and request assistance  5/10/2024 1632 by Fili Vang RN  Outcome: Progressing  Flowsheets (Taken 5/10/2024 1306)  Verbalizes/displays adequate comfort level or baseline comfort level: Encourage patient to monitor pain and request assistance     Problem: Discharge Planning  Goal: Discharge to home or other facility with appropriate resources  5/11/2024 0148 by Mere Meyer RN  Outcome: Progressing  Flowsheets (Taken 5/10/2024 2050)  Discharge to home or other facility with appropriate resources: Identify barriers to discharge with patient and caregiver  5/10/2024 1632 by Fili Vang RN  Outcome: Progressing  Flowsheets (Taken 5/10/2024 1306)  Discharge to home or other facility with appropriate resources: Identify barriers to discharge with patient and caregiver     Problem: ABCDS Injury Assessment  Goal: Absence of physical injury  Outcome: Progressing  Flowsheets (Taken 5/10/2024 2050)  Absence of Physical Injury: Implement safety measures based on patient assessment     Problem: Cardiovascular - Adult  Goal: Maintains optimal cardiac output and hemodynamic stability  5/11/2024 0148 by Mere Meyer, RN  Outcome: Progressing  Flowsheets (Taken 5/10/2024 2050)  Maintains optimal cardiac output and hemodynamic stability: Monitor urine output and notify Licensed Independent Practitioner for values outside of normal range  5/10/2024 1632 by Fili Vang RN  Outcome: Progressing  Flowsheets (Taken 5/10/2024 1306)  Maintains optimal cardiac output and hemodynamic stability: Monitor urine output and notify Licensed Independent Practitioner for values outside of normal range  Goal:

## 2024-05-12 PROCEDURE — 99232 SBSQ HOSP IP/OBS MODERATE 35: CPT | Performed by: STUDENT IN AN ORGANIZED HEALTH CARE EDUCATION/TRAINING PROGRAM

## 2024-05-12 PROCEDURE — 99232 SBSQ HOSP IP/OBS MODERATE 35: CPT | Performed by: INTERNAL MEDICINE

## 2024-05-12 PROCEDURE — 94660 CPAP INITIATION&MGMT: CPT

## 2024-05-12 PROCEDURE — 2580000003 HC RX 258: Performed by: NURSE PRACTITIONER

## 2024-05-12 PROCEDURE — 1200000003 HC TELEMETRY R&B

## 2024-05-12 PROCEDURE — 6370000000 HC RX 637 (ALT 250 FOR IP): Performed by: NURSE PRACTITIONER

## 2024-05-12 RX ADMIN — ROPINIROLE HYDROCHLORIDE 1 MG: 1 TABLET, FILM COATED ORAL at 21:41

## 2024-05-12 RX ADMIN — PRIMIDONE 50 MG: 50 TABLET ORAL at 08:48

## 2024-05-12 RX ADMIN — PANTOPRAZOLE SODIUM 40 MG: 40 TABLET, DELAYED RELEASE ORAL at 08:49

## 2024-05-12 RX ADMIN — Medication 6 MG: at 21:52

## 2024-05-12 RX ADMIN — PRIMIDONE 50 MG: 50 TABLET ORAL at 21:42

## 2024-05-12 RX ADMIN — Medication 1 TABLET: at 21:42

## 2024-05-12 RX ADMIN — OXYCODONE AND ACETAMINOPHEN 1 TABLET: 10; 325 TABLET ORAL at 08:52

## 2024-05-12 RX ADMIN — SODIUM CHLORIDE, PRESERVATIVE FREE 10 ML: 5 INJECTION INTRAVENOUS at 08:48

## 2024-05-12 RX ADMIN — MONTELUKAST SODIUM 10 MG: 10 TABLET ORAL at 21:42

## 2024-05-12 RX ADMIN — ONDANSETRON 4 MG: 4 TABLET, ORALLY DISINTEGRATING ORAL at 21:56

## 2024-05-12 RX ADMIN — GABAPENTIN 300 MG: 300 CAPSULE ORAL at 21:41

## 2024-05-12 RX ADMIN — LEVOTHYROXINE SODIUM 175 MCG: 0.15 TABLET ORAL at 08:49

## 2024-05-12 RX ADMIN — SEVELAMER CARBONATE 800 MG: 800 TABLET, FILM COATED ORAL at 17:12

## 2024-05-12 RX ADMIN — CARVEDILOL 25 MG: 25 TABLET, FILM COATED ORAL at 17:12

## 2024-05-12 RX ADMIN — OXYCODONE AND ACETAMINOPHEN 1 TABLET: 10; 325 TABLET ORAL at 21:52

## 2024-05-12 RX ADMIN — ASPIRIN 81 MG: 81 TABLET, COATED ORAL at 08:48

## 2024-05-12 RX ADMIN — CARVEDILOL 25 MG: 25 TABLET, FILM COATED ORAL at 08:48

## 2024-05-12 RX ADMIN — SERTRALINE HYDROCHLORIDE 200 MG: 100 TABLET ORAL at 08:48

## 2024-05-12 RX ADMIN — SEVELAMER CARBONATE 800 MG: 800 TABLET, FILM COATED ORAL at 13:52

## 2024-05-12 RX ADMIN — SEVELAMER CARBONATE 800 MG: 800 TABLET, FILM COATED ORAL at 08:48

## 2024-05-12 RX ADMIN — SODIUM CHLORIDE, PRESERVATIVE FREE 10 ML: 5 INJECTION INTRAVENOUS at 21:00

## 2024-05-12 RX ADMIN — BISACODYL 5 MG: 5 TABLET, COATED ORAL at 21:43

## 2024-05-12 RX ADMIN — TAMSULOSIN HYDROCHLORIDE 0.4 MG: 0.4 CAPSULE ORAL at 21:42

## 2024-05-12 ASSESSMENT — PAIN DESCRIPTION - LOCATION
LOCATION: BACK

## 2024-05-12 ASSESSMENT — PAIN SCALES - GENERAL
PAINLEVEL_OUTOF10: 5
PAINLEVEL_OUTOF10: 8
PAINLEVEL_OUTOF10: 8
PAINLEVEL_OUTOF10: 6
PAINLEVEL_OUTOF10: 5
PAINLEVEL_OUTOF10: 6
PAINLEVEL_OUTOF10: 8

## 2024-05-12 ASSESSMENT — PAIN DESCRIPTION - ORIENTATION
ORIENTATION: LOWER

## 2024-05-12 ASSESSMENT — PAIN DESCRIPTION - DESCRIPTORS
DESCRIPTORS: ACHING
DESCRIPTORS: DISCOMFORT

## 2024-05-12 ASSESSMENT — PAIN DESCRIPTION - ONSET
ONSET: ON-GOING
ONSET: ON-GOING

## 2024-05-12 ASSESSMENT — PAIN DESCRIPTION - PAIN TYPE
TYPE: CHRONIC PAIN

## 2024-05-12 NOTE — PROGRESS NOTES
Cardiology Progress Note      Patient:  Farhad Mylse  YOB: 1960  MRN: [id number]   Acct: [id number]  Admit Date:  5/9/2024  Primary Cardiologist: Marco George MD  Note per Dr Segura:  \"CHIEF COMPLAINT: blood in dialysate         HPI: This is a pleasant 63 y.o. male presented to the ED with complaints of blood in his dialysate and concerns for hemolysis.  He also complains of heart palpitations, chest pressure, and worsening shortness of breath over the past 2 weeks.  Chest pressure has been occurring up to a couple times per day and is not associated with exercise or worsening shortness of breath.  Chest pressure lasts about 2 minutes at a time and self resolved.  He also complains of palpitations over the past 2 weeks as well.  Palpitations lasted for 20 seconds and self resolved.  He says his blood pressure has also been high for the past 2 weeks. He reports using a home dialysis machine and says he has been missing 1-2 treatments per week because he feels bad after he completes dialysis.Troponins were trended with a flat trend of 63, 59, 62.  proBNP was markedly elevated above baseline at [address], and chest x-ray did show interstitial pulmonary edema.           Echo:TTE 3/24/2023     Summary   Technically difficult study due to poor acoustic windows.   Ejection fraction is visually estimated at 45-50%.   There was mild global hypokinesis of the left ventricle.     Cardiac Cath: 12/1/2020     SUMMARY:  1.  Elevated right heart pressures with elevated LVEPD.  2.  Patent coronaries.     RECOMMENDATIONS:  1.  IV diuresis.  2.  Monitor electrolytes.  3.  Optimize heart failure therapy.  4.  Weight loss advised.  5.  Optimize sleep apnea therapies.  6.  Follow up in clinic in one to two weeks to evaluate symptoms  further.     All labs, EKG's, diagnostic testing and images as well as cardiac cath, stress testing were reviewed during this encounter\"    Subjective (Events in last 24 hours):   Pt awake,

## 2024-05-12 NOTE — PROGRESS NOTES
Hospitalist Progress Note    Patient:  Farhad Myles      Unit/Bed:8A-04/004-A    YOB: 1960    MRN: [id number]       Acct: [id number]     PCP: No primary care provider on file.    Date of Admission: 5/9/2024    Assessment/Plan:    Acute on chronic respiratory failure with hypoxia: Baseline 2L/NC increased to 3L/NC  Wean O2 as able  Pulmonary Hygiene  Cardiology consulted  TTE pending    Acute on chronic HFmrEF; ECHO 2023 LVEF 45-50%:   Cardiology and nephrology consulted  Daily weights  Strict I&O's  Pro-BNP- 10,598  Continue Bumex, Coreg   TTE pending    Cardiac Palpitations: Reportedly intermittent for the past 2 weeks  Cardiology consulted  Patient taken for stress test, plan for LHC on Monday    Hemolysis during hemodialysis:  Nephrology consulted, managing dialysis    Accelerated hypertension:  NTG gtt started on admission, wean per Cardiology  Cardiology consulted  Patient taken for stress test, plan for LHC Monday    ESRD on hemodialysis:  Nephrology consulted for management    LAURIE-Bipap: ok to use home unit.    Hypothyroidism: history TSH/T4 noted. No signs of organ decompensation. Synthroid to continue    Anemia, macrocytic: monitor and trend    Chronic pain: Percocet were continued, OARRS reviewed    RLS: history      LDA: []CVC / []PICC / []Midline / []Martinez / []Drains / []Mediport / []None  Antibiotics: No  Steroids: No  Labs (still needed?): [x]Yes / []No  IVF (still needed?): []Yes / [x]No    Level of care: []Step Down / [x]Med-Surg  Bed Status: [x]Inpatient / []Observation  Telemetry: [x]Yes / []No  PT/OT: [x]Yes / []No    DVT Prophylaxis: [] Lovenox / [] Heparin / [x] SCDs / [] Already on Systemic Anticoagulation / [] None     Disposition:    [x] Home       [] TCU       [] Rehab       [] Psych       [] SNF       [] Long Term Care Facility       [] Other-    Chief Complaint: shortness of breath and cardiac palpitations       Subjective: 63 y.o. male admitted to the hospitalist

## 2024-05-12 NOTE — PROGRESS NOTES
[facility]   Nephrology progress note  5/12/2024, 12:28 PM      Pt Name:    Farhad Myles  MRN:     [id number]     YOB: 1960  Admit Date:    5/9/2024  7:05 PM    Chief Complaint: Nephrology following for ESRD on hemodialysis.    Subjective:  Patient seen and examined  Had some chest pain on and off  Being planned for a cardiac cath tomorrow after dialysis    Objective:  24HR INTAKE/OUTPUT:    Intake/Output Summary (Last 24 hours) at 5/12/2024 1228  Last data filed at 5/11/2024 1610  Gross per 24 hour   Intake 31.36 ml   Output --   Net 31.36 ml        Admission weight: (!) 137.9 kg (304 lb 0.2 oz)    Wt Readings from Last 3 Encounters:   05/12/24 (!) 138.6 kg (305 lb 8.9 oz)   04/16/24 (!) 140.2 kg (309 lb 1.4 oz)   04/11/24 (!) 140.2 kg (309 lb)        Vitals :   Vitals:    05/12/24 0421 05/12/24 0605 05/12/24 0842 05/12/24 1149   BP:   (!) 166/89 (!) 150/81   Pulse:   58 57   Resp: 13  15 15   Temp:   97.7 °F (36.5 °C) 97 °F (36.1 °C)   TempSrc:   Axillary Oral   SpO2:   95% 93%   Weight:  (!) 138.6 kg (305 lb 8.9 oz)     Height:           Physical examination  General Appearance:  Well developed. No distress  Mouth/Throat:  Oral mucosa moist  Neck:  Supple, no JVD  Lungs:  Breath sounds: clear  Heart::  S1,S2 heard  Abdomen:  Soft, non - tender  Musculoskeletal:  Edema -no significant edema    Medications:  Infusion:    sodium chloride      nitroGLYCERIN Stopped (05/11/24 1549)     Meds:    sodium chloride flush  5-40 mL IntraVENous 2 times per day    senna  1 tablet Oral Nightly    gabapentin  300 mg Oral Nightly    aspirin EC  81 mg Oral Daily    carvedilol  25 mg Oral BID WC    levothyroxine  175 mcg Oral Daily    montelukast  10 mg Oral Nightly    multivitamin  1 tablet Oral Nightly    pantoprazole  40 mg Oral QAM AC    primidone  50 mg Oral BID    rOPINIRole  1 mg Oral Nightly    sertraline  200 mg Oral Daily    sevelamer  800 mg Oral TID WC    tamsulosin  0.4 mg Oral

## 2024-05-12 NOTE — PLAN OF CARE
Problem: Pain  Goal: Verbalizes/displays adequate comfort level or baseline comfort level  Outcome: Progressing     Problem: Discharge Planning  Goal: Discharge to home or other facility with appropriate resources  Outcome: Progressing     Problem: ABCDS Injury Assessment  Goal: Absence of physical injury  Outcome: Progressing     Problem: Cardiovascular - Adult  Goal: Maintains optimal cardiac output and hemodynamic stability  Outcome: Progressing  Goal: Absence of cardiac dysrhythmias or at baseline  Outcome: Progressing     Problem: Safety - Adult  Goal: Free from fall injury  Outcome: Progressing

## 2024-05-13 PROBLEM — I20.0 UNSTABLE ANGINA (HCC): Status: ACTIVE | Noted: 2024-05-09

## 2024-05-13 LAB
ABO: NORMAL
ACTIVATED CLOTTING TIME: 195 SECONDS (ref 1–150)
ANION GAP SERPL CALC-SCNC: 15 MEQ/L (ref 8–16)
ANTIBODY SCREEN: NORMAL
APTT PPP: 32.5 SECONDS (ref 22–38)
BUN SERPL-MCNC: 40 MG/DL (ref 7–22)
CALCIUM SERPL-MCNC: 9.1 MG/DL (ref 8.5–10.5)
CHLORIDE SERPL-SCNC: 102 MEQ/L (ref 98–111)
CO2 SERPL-SCNC: 23 MEQ/L (ref 23–33)
CREAT SERPL-MCNC: 5.4 MG/DL (ref 0.4–1.2)
DEPRECATED RDW RBC AUTO: 49.7 FL (ref 35–45)
ECHO BSA: 2.66 M2
EKG ATRIAL RATE: 60 BPM
EKG ATRIAL RATE: 63 BPM
EKG P AXIS: 6 DEGREES
EKG P AXIS: 83 DEGREES
EKG P-R INTERVAL: 194 MS
EKG P-R INTERVAL: 202 MS
EKG Q-T INTERVAL: 468 MS
EKG Q-T INTERVAL: 494 MS
EKG QRS DURATION: 148 MS
EKG QRS DURATION: 170 MS
EKG QTC CALCULATION (BAZETT): 468 MS
EKG QTC CALCULATION (BAZETT): 505 MS
EKG R AXIS: 17 DEGREES
EKG R AXIS: 41 DEGREES
EKG T AXIS: -11 DEGREES
EKG T AXIS: -2 DEGREES
EKG VENTRICULAR RATE: 60 BPM
EKG VENTRICULAR RATE: 63 BPM
ERYTHROCYTE [DISTWIDTH] IN BLOOD BY AUTOMATED COUNT: 14.3 % (ref 11.5–14.5)
GFR SERPL CREATININE-BSD FRML MDRD: 11 ML/MIN/1.73M2
GLUCOSE SERPL-MCNC: 86 MG/DL (ref 70–108)
HCT VFR BLD AUTO: 29.9 % (ref 42–52)
HGB BLD-MCNC: 9.7 GM/DL (ref 14–18)
INR PPP: 1.03 (ref 0.85–1.13)
MCH RBC QN AUTO: 31.2 PG (ref 26–33)
MCHC RBC AUTO-ENTMCNC: 32.4 GM/DL (ref 32.2–35.5)
MCV RBC AUTO: 96.1 FL (ref 80–94)
PLATELET # BLD AUTO: 194 THOU/MM3 (ref 130–400)
PMV BLD AUTO: 9.2 FL (ref 9.4–12.4)
POTASSIUM SERPL-SCNC: 4.2 MEQ/L (ref 3.5–5.2)
RBC # BLD AUTO: 3.11 MILL/MM3 (ref 4.7–6.1)
RH FACTOR: NORMAL
SODIUM SERPL-SCNC: 140 MEQ/L (ref 135–145)
WBC # BLD AUTO: 6.6 THOU/MM3 (ref 4.8–10.8)

## 2024-05-13 PROCEDURE — 90935 HEMODIALYSIS ONE EVALUATION: CPT | Performed by: INTERNAL MEDICINE

## 2024-05-13 PROCEDURE — C1874 STENT, COATED/COV W/DEL SYS: HCPCS | Performed by: INTERNAL MEDICINE

## 2024-05-13 PROCEDURE — 90935 HEMODIALYSIS ONE EVALUATION: CPT

## 2024-05-13 PROCEDURE — 80048 BASIC METABOLIC PNL TOTAL CA: CPT

## 2024-05-13 PROCEDURE — 86850 RBC ANTIBODY SCREEN: CPT

## 2024-05-13 PROCEDURE — 85347 COAGULATION TIME ACTIVATED: CPT

## 2024-05-13 PROCEDURE — 92928 PRQ TCAT PLMT NTRAC ST 1 LES: CPT | Performed by: INTERNAL MEDICINE

## 2024-05-13 PROCEDURE — 99153 MOD SED SAME PHYS/QHP EA: CPT | Performed by: INTERNAL MEDICINE

## 2024-05-13 PROCEDURE — 1200000003 HC TELEMETRY R&B

## 2024-05-13 PROCEDURE — 36415 COLL VENOUS BLD VENIPUNCTURE: CPT

## 2024-05-13 PROCEDURE — 2580000003 HC RX 258: Performed by: INTERNAL MEDICINE

## 2024-05-13 PROCEDURE — C1887 CATHETER, GUIDING: HCPCS | Performed by: INTERNAL MEDICINE

## 2024-05-13 PROCEDURE — 6370000000 HC RX 637 (ALT 250 FOR IP): Performed by: INTERNAL MEDICINE

## 2024-05-13 PROCEDURE — 99152 MOD SED SAME PHYS/QHP 5/>YRS: CPT | Performed by: INTERNAL MEDICINE

## 2024-05-13 PROCEDURE — 4A023N7 MEASUREMENT OF CARDIAC SAMPLING AND PRESSURE, LEFT HEART, PERCUTANEOUS APPROACH: ICD-10-PCS | Performed by: INTERNAL MEDICINE

## 2024-05-13 PROCEDURE — C1769 GUIDE WIRE: HCPCS | Performed by: INTERNAL MEDICINE

## 2024-05-13 PROCEDURE — C9600 PERC DRUG-EL COR STENT SING: HCPCS | Performed by: INTERNAL MEDICINE

## 2024-05-13 PROCEDURE — B2151ZZ FLUOROSCOPY OF LEFT HEART USING LOW OSMOLAR CONTRAST: ICD-10-PCS | Performed by: INTERNAL MEDICINE

## 2024-05-13 PROCEDURE — B2111ZZ FLUOROSCOPY OF MULTIPLE CORONARY ARTERIES USING LOW OSMOLAR CONTRAST: ICD-10-PCS | Performed by: INTERNAL MEDICINE

## 2024-05-13 PROCEDURE — 86901 BLOOD TYPING SEROLOGIC RH(D): CPT

## 2024-05-13 PROCEDURE — 6360000004 HC RX CONTRAST MEDICATION: Performed by: INTERNAL MEDICINE

## 2024-05-13 PROCEDURE — 2500000003 HC RX 250 WO HCPCS: Performed by: INTERNAL MEDICINE

## 2024-05-13 PROCEDURE — 85610 PROTHROMBIN TIME: CPT

## 2024-05-13 PROCEDURE — 86900 BLOOD TYPING SEROLOGIC ABO: CPT

## 2024-05-13 PROCEDURE — 6370000000 HC RX 637 (ALT 250 FOR IP): Performed by: NURSE PRACTITIONER

## 2024-05-13 PROCEDURE — 93005 ELECTROCARDIOGRAM TRACING: CPT | Performed by: STUDENT IN AN ORGANIZED HEALTH CARE EDUCATION/TRAINING PROGRAM

## 2024-05-13 PROCEDURE — C1725 CATH, TRANSLUMIN NON-LASER: HCPCS | Performed by: INTERNAL MEDICINE

## 2024-05-13 PROCEDURE — 85730 THROMBOPLASTIN TIME PARTIAL: CPT

## 2024-05-13 PROCEDURE — 2709999900 HC NON-CHARGEABLE SUPPLY: Performed by: INTERNAL MEDICINE

## 2024-05-13 PROCEDURE — 93458 L HRT ARTERY/VENTRICLE ANGIO: CPT | Performed by: INTERNAL MEDICINE

## 2024-05-13 PROCEDURE — 85027 COMPLETE CBC AUTOMATED: CPT

## 2024-05-13 PROCEDURE — 027135Z DILATION OF CORONARY ARTERY, TWO ARTERIES WITH TWO DRUG-ELUTING INTRALUMINAL DEVICES, PERCUTANEOUS APPROACH: ICD-10-PCS | Performed by: INTERNAL MEDICINE

## 2024-05-13 PROCEDURE — 93010 ELECTROCARDIOGRAM REPORT: CPT | Performed by: INTERNAL MEDICINE

## 2024-05-13 PROCEDURE — 2580000003 HC RX 258: Performed by: NURSE PRACTITIONER

## 2024-05-13 PROCEDURE — 6360000002 HC RX W HCPCS: Performed by: INTERNAL MEDICINE

## 2024-05-13 PROCEDURE — C1894 INTRO/SHEATH, NON-LASER: HCPCS | Performed by: INTERNAL MEDICINE

## 2024-05-13 PROCEDURE — 93005 ELECTROCARDIOGRAM TRACING: CPT | Performed by: INTERNAL MEDICINE

## 2024-05-13 RX ORDER — SODIUM CHLORIDE 0.9 % (FLUSH) 0.9 %
5-40 SYRINGE (ML) INJECTION PRN
Status: DISCONTINUED | OUTPATIENT
Start: 2024-05-13 | End: 2024-05-13 | Stop reason: SDUPTHER

## 2024-05-13 RX ORDER — ASPIRIN 325 MG
325 TABLET, DELAYED RELEASE (ENTERIC COATED) ORAL ONCE
Status: DISCONTINUED | OUTPATIENT
Start: 2024-05-13 | End: 2024-05-14 | Stop reason: ALTCHOICE

## 2024-05-13 RX ORDER — SODIUM CHLORIDE 0.9 % (FLUSH) 0.9 %
5-40 SYRINGE (ML) INJECTION EVERY 12 HOURS SCHEDULED
Status: DISCONTINUED | OUTPATIENT
Start: 2024-05-13 | End: 2024-05-14 | Stop reason: ALTCHOICE

## 2024-05-13 RX ORDER — ROSUVASTATIN CALCIUM 10 MG/1
10 TABLET, COATED ORAL NIGHTLY
Status: DISCONTINUED | OUTPATIENT
Start: 2024-05-13 | End: 2024-05-14

## 2024-05-13 RX ORDER — CLOPIDOGREL 300 MG/1
TABLET, FILM COATED ORAL PRN
Status: DISCONTINUED | OUTPATIENT
Start: 2024-05-13 | End: 2024-05-13 | Stop reason: HOSPADM

## 2024-05-13 RX ORDER — ASPIRIN 325 MG
325 TABLET ORAL ONCE
Status: DISCONTINUED | OUTPATIENT
Start: 2024-05-13 | End: 2024-05-13

## 2024-05-13 RX ORDER — SODIUM CHLORIDE 9 MG/ML
INJECTION, SOLUTION INTRAVENOUS PRN
Status: DISCONTINUED | OUTPATIENT
Start: 2024-05-13 | End: 2024-05-14 | Stop reason: HOSPADM

## 2024-05-13 RX ORDER — NITROGLYCERIN 0.4 MG/1
0.4 TABLET SUBLINGUAL EVERY 5 MIN PRN
Status: DISCONTINUED | OUTPATIENT
Start: 2024-05-13 | End: 2024-05-13

## 2024-05-13 RX ORDER — DIPHENHYDRAMINE HYDROCHLORIDE 50 MG/ML
50 INJECTION INTRAMUSCULAR; INTRAVENOUS ONCE
Status: DISCONTINUED | OUTPATIENT
Start: 2024-05-13 | End: 2024-05-13

## 2024-05-13 RX ORDER — FENTANYL CITRATE 50 UG/ML
INJECTION, SOLUTION INTRAMUSCULAR; INTRAVENOUS PRN
Status: DISCONTINUED | OUTPATIENT
Start: 2024-05-13 | End: 2024-05-13 | Stop reason: HOSPADM

## 2024-05-13 RX ORDER — SODIUM CHLORIDE 0.9 % (FLUSH) 0.9 %
5-40 SYRINGE (ML) INJECTION PRN
Status: DISCONTINUED | OUTPATIENT
Start: 2024-05-13 | End: 2024-05-14 | Stop reason: ALTCHOICE

## 2024-05-13 RX ORDER — DIPHENHYDRAMINE HYDROCHLORIDE 50 MG/ML
50 INJECTION INTRAMUSCULAR; INTRAVENOUS ONCE
Status: DISCONTINUED | OUTPATIENT
Start: 2024-05-13 | End: 2024-05-14 | Stop reason: ALTCHOICE

## 2024-05-13 RX ORDER — SODIUM CHLORIDE 0.9 % (FLUSH) 0.9 %
5-40 SYRINGE (ML) INJECTION EVERY 12 HOURS SCHEDULED
Status: DISCONTINUED | OUTPATIENT
Start: 2024-05-13 | End: 2024-05-13 | Stop reason: SDUPTHER

## 2024-05-13 RX ORDER — HEPARIN SODIUM 1000 [USP'U]/ML
INJECTION, SOLUTION INTRAVENOUS; SUBCUTANEOUS PRN
Status: DISCONTINUED | OUTPATIENT
Start: 2024-05-13 | End: 2024-05-13 | Stop reason: HOSPADM

## 2024-05-13 RX ORDER — SODIUM CHLORIDE 9 MG/ML
INJECTION, SOLUTION INTRAVENOUS PRN
Status: DISCONTINUED | OUTPATIENT
Start: 2024-05-13 | End: 2024-05-14 | Stop reason: SDUPTHER

## 2024-05-13 RX ORDER — ACETAMINOPHEN 325 MG/1
650 TABLET ORAL EVERY 4 HOURS PRN
Status: DISCONTINUED | OUTPATIENT
Start: 2024-05-13 | End: 2024-05-14 | Stop reason: HOSPADM

## 2024-05-13 RX ORDER — CLOPIDOGREL BISULFATE 75 MG/1
75 TABLET ORAL DAILY
Status: DISCONTINUED | OUTPATIENT
Start: 2024-05-14 | End: 2024-05-14 | Stop reason: HOSPADM

## 2024-05-13 RX ORDER — MIDAZOLAM HYDROCHLORIDE 1 MG/ML
INJECTION INTRAMUSCULAR; INTRAVENOUS PRN
Status: DISCONTINUED | OUTPATIENT
Start: 2024-05-13 | End: 2024-05-13 | Stop reason: HOSPADM

## 2024-05-13 RX ADMIN — ROPINIROLE HYDROCHLORIDE 1 MG: 1 TABLET, FILM COATED ORAL at 22:09

## 2024-05-13 RX ADMIN — OXYCODONE AND ACETAMINOPHEN 1 TABLET: 10; 325 TABLET ORAL at 18:57

## 2024-05-13 RX ADMIN — SENNOSIDES 8.6 MG: 8.6 TABLET, FILM COATED ORAL at 20:09

## 2024-05-13 RX ADMIN — ROSUVASTATIN 10 MG: 10 TABLET, FILM COATED ORAL at 22:09

## 2024-05-13 RX ADMIN — MONTELUKAST SODIUM 10 MG: 10 TABLET ORAL at 22:09

## 2024-05-13 RX ADMIN — SODIUM CHLORIDE, PRESERVATIVE FREE 10 ML: 5 INJECTION INTRAVENOUS at 20:10

## 2024-05-13 RX ADMIN — Medication 6 MG: at 22:15

## 2024-05-13 RX ADMIN — LEVOTHYROXINE SODIUM 175 MCG: 0.15 TABLET ORAL at 06:37

## 2024-05-13 RX ADMIN — GABAPENTIN 300 MG: 300 CAPSULE ORAL at 20:09

## 2024-05-13 RX ADMIN — PRIMIDONE 50 MG: 50 TABLET ORAL at 20:09

## 2024-05-13 RX ADMIN — Medication 1 TABLET: at 22:09

## 2024-05-13 RX ADMIN — TAMSULOSIN HYDROCHLORIDE 0.4 MG: 0.4 CAPSULE ORAL at 22:09

## 2024-05-13 RX ADMIN — PANTOPRAZOLE SODIUM 40 MG: 40 TABLET, DELAYED RELEASE ORAL at 06:37

## 2024-05-13 ASSESSMENT — PAIN SCALES - GENERAL
PAINLEVEL_OUTOF10: 7
PAINLEVEL_OUTOF10: 9
PAINLEVEL_OUTOF10: 5

## 2024-05-13 ASSESSMENT — PAIN DESCRIPTION - PAIN TYPE
TYPE: CHRONIC PAIN
TYPE: CHRONIC PAIN

## 2024-05-13 ASSESSMENT — PAIN DESCRIPTION - DESCRIPTORS
DESCRIPTORS: ACHING

## 2024-05-13 ASSESSMENT — PAIN - FUNCTIONAL ASSESSMENT: PAIN_FUNCTIONAL_ASSESSMENT: ACTIVITIES ARE NOT PREVENTED

## 2024-05-13 ASSESSMENT — PAIN DESCRIPTION - ORIENTATION: ORIENTATION: LOWER

## 2024-05-13 ASSESSMENT — PAIN DESCRIPTION - LOCATION
LOCATION: BACK
LOCATION: BACK;SHOULDER

## 2024-05-13 NOTE — PROGRESS NOTES
[facility]   Nephrology progress note  5/13/2024, 9:22 AM      Pt Name:    Farhad Myles  MRN:     [id number]     YOB: 1960  Admit Date:    5/9/2024  7:05 PM    Chief Complaint: Nephrology following for ESRD and hemodialysis    Subjective:  Patient was seen and examined this morning  Seen on dialysis  Tolerating hemodialysis treatment well  No chest pain or shortness of breath      Objective:  24HR INTAKE/OUTPUT:    Intake/Output Summary (Last 24 hours) at 5/13/2024 0922  Last data filed at 5/13/2024 0000  Gross per 24 hour   Intake 180 ml   Output --   Net 180 ml         I/O last 3 completed shifts:  In: 380 [P.O.:380]  Out: 600 [Urine:600]  No intake/output data recorded.   Admission weight: (!) 137.9 kg (304 lb 0.2 oz)  Wt Readings from Last 3 Encounters:   05/13/24 134.8 kg (297 lb 2.9 oz)   04/16/24 (!) 140.2 kg (309 lb 1.4 oz)   04/11/24 (!) 140.2 kg (309 lb)        Vitals :   Vitals:    05/12/24 2152 05/12/24 2222 05/13/24 0000 05/13/24 0737   BP:   (!) 152/68 (!) 175/92   Pulse:   64 62   Resp:   18 16   Temp:   98.2 °F (36.8 °C) 96.8 °F (36 °C)   TempSrc:   Oral    SpO2: 93% 93% 93% 98%   Weight:    134.8 kg (297 lb 2.9 oz)   Height:           Physical examination  General Appearance: alert and cooperative with exam, appears comfortable, no distress  Mouth/Throat: Oral mucosa moist  Neck: No JVD visibly noted  Lungs:  no use of accessory muscles    Medications:  Infusion:    sodium chloride      sodium chloride      nitroGLYCERIN Stopped (05/11/24 1549)     Meds:    hydrocortisone sodium succinate PF  200 mg IntraVENous Once    diphenhydrAMINE  50 mg IntraVENous Once    aspirin  325 mg Oral Once    sodium chloride flush  5-40 mL IntraVENous 2 times per day    senna  1 tablet Oral Nightly    gabapentin  300 mg Oral Nightly    aspirin EC  81 mg Oral Daily    carvedilol  25 mg Oral BID WC    levothyroxine  175 mcg Oral Daily    montelukast  10 mg Oral Nightly

## 2024-05-13 NOTE — H&P
CNP, 800 mg at 05/12/24 1712    tamsulosin (FLOMAX) capsule 0.4 mg, 0.4 mg, Oral, Nightly, Sharmila Glez APRN - CNP, 0.4 mg at 05/12/24 2142  Prior to Admission medications    Medication Sig Start Date End Date Taking? Authorizing Provider   gabapentin (NEURONTIN) 300 MG capsule Take 1 capsule by mouth nightly for 360 days. 4/18/24 4/13/25  Kobe Gale DO   oxyCODONE-acetaminophen (PERCOCET)  MG per tablet Take 1 tablet by mouth every 8 hours as needed for Pain for up to 30 days. Intended supply: 30 days Max Daily Amount: 3 tablets 4/16/24 5/16/24  Joel Holcomb APRN - CNP   tamsulosin (FLOMAX) 0.4 MG capsule Take 1 capsule by mouth daily 4/11/24 7/10/24  Cally Yang APRN - CNP   carvedilol (COREG) 25 MG tablet Take 1 tablet by mouth 2 times daily 3/26/24   Tanner Walker MD   levothyroxine (SYNTHROID) 175 MCG tablet Take 1 tablet by mouth Daily 3/26/24   Tanner Walker MD   montelukast (SINGULAIR) 10 MG tablet Take 1 tablet by mouth nightly 3/26/24   Tanner Walker MD   pantoprazole (PROTONIX) 20 MG tablet Take 1 tablet by mouth every morning (before breakfast) 3/26/24   Tanner Walker MD   primidone (MYSOLINE) 50 MG tablet Take 1 tablet by mouth in the morning and at bedtime 3/26/24   Tanner Walker MD   rOPINIRole (REQUIP) 0.5 MG tablet Take 1 tablet in the afternoon and 2 tablets at nighttime 3/26/24   Tanner Walker MD   sertraline (ZOLOFT) 100 MG tablet Take 2 tablets by mouth daily 3/26/24   Tanner Walker MD   melatonin 3 MG TABS tablet Take 2 tablets by mouth nightly as needed (Sleep) 11/10/23   Tiff Pierre APRN - CNP   UNABLE TO FIND Venafer  injected at dialysis    Provider, MD Tammi   Misc. Devices (CPAP MACHINE) MISC by Does not apply route Please change his current CPAP/BiPAP pressure to a IPAP 16 cmH2O and EPAP 12 cm2O    Please pull download in 2 weeks 8/11/23   Tiff Pierre, APRN - CNP   Multiple Vitamins-Minerals 
left sternal border  Abdomen: soft, non-tender; bowel sounds normal; no masses,  no organomegaly and large nontender  Extremities: extremities normal, atraumatic, no cyanosis or edema and positive for pitting edema bilateral lower legs  Neurologic: Mental status: Alert, oriented, thought content appropriate    CBC:   Recent Labs     24   WBC 7.9   HGB 10.7*        BMP:    Recent Labs     24      K 3.7   CL 97*   CO2 26   BUN 42*   CREATININE 5.3*   GLUCOSE 108     Hepatic:   Recent Labs     24   AST 13   ALT 11   BILITOT 0.3   ALKPHOS 84     Troponin: No results for input(s): \"TROPONINI\" in the last 72 hours.  BNP: No results for input(s): \"BNP\" in the last 72 hours.  Lipids: No results for input(s): \"CHOL\", \"HDL\" in the last 72 hours.    Invalid input(s): \"LDLCALCU\"  ABGs: No results found for: \"PHART\", \"PO2ART\", \"IKJ4GHU\"  INR: No results for input(s): \"INR\" in the last 72 hours.  -----------------------------------------------------------------  PA/lat CXR: 2024 pulmonary edema.  No significant pleural effusion or pneumothorax.  No acute fracture  EK2024 normal sinus rhythm heart rate 65  QTc 497    ECHO: 3/24/2023 LVEF 45 to 50%, mild global hypokinesis left ventricle.    Assessment and Plan   Acute on chronic respiratory failure, hypoxia: baseline 2L/NC increased to 3L/NC, ABG is pending. Titrate to maintain SaO2 90% or higher.   Acute on chronic HFmrEF; ECHO  LVEF 45-50%. volume overload unable to complete HEMO treatment 24. IVP Bumex x1 given. POCUS examination noted Blines in all lung fields. Nephrology to evaluate for HEMO treatment.   Cardiac Palpitations: intermittently for the past 2 weeks. Completely resolved while in the ER. LHC and RHC 2020 nonobstructive CAD. Troponin evaluation 63-59 . EKG RBBB.   Hemolysis during hemodialysis: Reported by dialysis nurse blood in the dialysate.  Patient was sent to the emergency room for

## 2024-05-13 NOTE — PROGRESS NOTES
Hospitalist Progress Note    Patient:  Farhad Myles      Unit/Bed:8A-04/004-A    YOB: 1960    MRN: [id number]       Acct: [id number]     PCP: No primary care provider on file.    Date of Admission: 5/9/2024    Assessment/Plan:    Acute on chronic respiratory failure with hypoxia: Baseline 2L/NC increased to 3L/NC  Wean O2 as able  Pulmonary Hygiene  Cardiology consulted  TTE pending    Acute on chronic HFmrEF; ECHO 2023 LVEF 45-50%:   Cardiology and nephrology consulted  Daily weights  Strict I&O's  Pro-BNP- 10,598  Continue Bumex, Coreg   TTE pending    Cardiac Palpitations: Reportedly intermittent for the past 2 weeks  Cardiology consulted  Patient taken for stress test that was abdnormal  Plan for LHC today    Hemolysis during hemodialysis:  Nephrology consulted, patient visited during dialysis     Accelerated hypertension:  NTG gtt started on admission, wean per Cardiology  Cardiology consulted  Patient taken for stress test that was abnormal, plan for left heart cath today    ESRD on hemodialysis:  Nephrology consulted for management    LAURIE-Bipap: ok to use home unit.    Hypothyroidism: history TSH/T4 noted. No signs of organ decompensation. Synthroid to continue    Anemia, macrocytic: monitor and trend    Chronic pain: Percocet were continued, OARRS reviewed    RLS: history      LDA: []CVC / []PICC / []Midline / []Martinez / []Drains / []Mediport / []None  Antibiotics: No  Steroids: No  Labs (still needed?): [x]Yes / []No  IVF (still needed?): []Yes / [x]No    Level of care: []Step Down / [x]Med-Surg  Bed Status: [x]Inpatient / []Observation  Telemetry: [x]Yes / []No  PT/OT: [x]Yes / []No    DVT Prophylaxis: [] Lovenox / [] Heparin / [x] SCDs / [] Already on Systemic Anticoagulation / [] None     Disposition:    [x] Home       [] TCU       [] Rehab       [] Psych       [] SNF       [] Long Term Care Facility       [] Other-    Chief Complaint: shortness of breath and cardiac palpitations

## 2024-05-13 NOTE — FLOWSHEET NOTE
05/13/24 0737 05/13/24 1208   Vital Signs   BP (!) 175/92 (!) 146/80   Temp 96.8 °F (36 °C) 97 °F (36.1 °C)   Pulse 62 79   Respirations 16 12   SpO2 98 % 98 %   Weight - Scale 134.8 kg (297 lb 2.9 oz) 131.8 kg (290 lb 9.1 oz)   Weight Method Bed scale Bed scale   Percent Weight Change -2.74 -2.23   Post-Hemodialysis Assessment   Post-Treatment Procedures  --  Blood returned;Access bleeding time < 10 minutes   Machine Disinfection Process  --  Acid/Vinegar Clean;Heat Disinfect;Exterior Machine Disinfection   Blood Volume Processed (Liters)  --  91.8 L   Dialyzer Clearance  --  Lightly streaked   Duration of Treatment (minutes)  --  240 minutes   Hemodialysis Intake (ml)  --  400 ml   Hemodialysis Output (ml)  --  3400 ml   NET Removed (ml)  --  3000   Tolerated Treatment  --  Good     Stable 4 hour treatment completed. Removed 3 liters of fluid as tolerated. Pressure held to needle sites x10 mins each. Dressing clean, dry, and intact. Report called to primary RN. Treatment record printed to be scanned into EMR.

## 2024-05-13 NOTE — PROGRESS NOTES
[facility]   PROGRESS NOTE      Patient: Farhad Myles  Room #: 8A-04/004-A            YOB: 1960  Age: 63 y.o.  Gender: male            Admit Date & Time: 5/9/2024  7:05 PM    Assessment:    The  attempted a visit and patient was not located in their room's care area.     Interventions:  The patient was not present to provide care for at this time.    Outcomes:  The patient was not present at this time.     Plan:  1.Spiritual care will continue to follow the patient according to [facility] spiritual care SOP.       Electronically signed by Chaplain Penny, on 5/13/2024 at 1:42 PM.  Spiritual Care Department  [facility]  [phone]     05/13/24 1342   Encounter Summary   Encounter Overview/Reason Attempted Encounter   Service Provided For Patient not available   Referral/Consult From Rounding   Support System Unknown   Last Encounter  05/13/24   Complexity of Encounter Low   Begin Time 1150   End Time  1150   Total Time Calculated 0 min   Assessment/Intervention/Outcome   Assessment Unable to assess

## 2024-05-14 ENCOUNTER — APPOINTMENT (OUTPATIENT)
Age: 64
DRG: 321 | End: 2024-05-14
Payer: MEDICARE

## 2024-05-14 ENCOUNTER — HOSPITAL ENCOUNTER (INPATIENT)
Age: 64
DRG: 321 | End: 2024-05-14
Payer: MEDICARE

## 2024-05-14 VITALS
TEMPERATURE: 98.1 F | RESPIRATION RATE: 18 BRPM | WEIGHT: 290.57 LBS | OXYGEN SATURATION: 98 % | HEIGHT: 73 IN | SYSTOLIC BLOOD PRESSURE: 168 MMHG | BODY MASS INDEX: 38.51 KG/M2 | HEART RATE: 68 BPM | DIASTOLIC BLOOD PRESSURE: 72 MMHG

## 2024-05-14 LAB
ANION GAP SERPL CALC-SCNC: 10 MEQ/L (ref 8–16)
CALCIUM SERPL-MCNC: 8.9 MG/DL (ref 8.5–10.5)
CO2 SERPL-SCNC: 24 MEQ/L (ref 23–33)
CREAT SERPL-MCNC: 4 MG/DL (ref 0.4–1.2)
DEPRECATED RDW RBC AUTO: 51 FL (ref 35–45)
ECHO BSA: 2.66 M2
ERYTHROCYTE [DISTWIDTH] IN BLOOD BY AUTOMATED COUNT: 14.5 % (ref 11.5–14.5)
GFR SERPL CREATININE-BSD FRML MDRD: 16 ML/MIN/1.73M2
HGB BLD-MCNC: 10 GM/DL (ref 14–18)
MCH RBC QN AUTO: 31 PG (ref 26–33)
MCHC RBC AUTO-ENTMCNC: 31.8 GM/DL (ref 32.2–35.5)
MCV RBC AUTO: 97.2 FL (ref 80–94)
PLATELET # BLD AUTO: 187 THOU/MM3 (ref 130–400)
PMV BLD AUTO: 9.2 FL (ref 9.4–12.4)
POTASSIUM SERPL-SCNC: 4.2 MEQ/L (ref 3.5–5.2)
RBC # BLD AUTO: 3.23 MILL/MM3 (ref 4.7–6.1)
SODIUM SERPL-SCNC: 134 MEQ/L (ref 135–145)
WBC # BLD AUTO: 5.6 THOU/MM3 (ref 4.8–10.8)

## 2024-05-14 PROCEDURE — 6370000000 HC RX 637 (ALT 250 FOR IP): Performed by: INTERNAL MEDICINE

## 2024-05-14 PROCEDURE — 99232 SBSQ HOSP IP/OBS MODERATE 35: CPT | Performed by: PHYSICIAN ASSISTANT

## 2024-05-14 PROCEDURE — 85027 COMPLETE CBC AUTOMATED: CPT

## 2024-05-14 PROCEDURE — 93270 REMOTE 30 DAY ECG REV/REPORT: CPT

## 2024-05-14 PROCEDURE — 2580000003 HC RX 258: Performed by: NURSE PRACTITIONER

## 2024-05-14 PROCEDURE — 80048 BASIC METABOLIC PNL TOTAL CA: CPT

## 2024-05-14 PROCEDURE — 6370000000 HC RX 637 (ALT 250 FOR IP): Performed by: NURSE PRACTITIONER

## 2024-05-14 PROCEDURE — 6360000002 HC RX W HCPCS: Performed by: PHYSICIAN ASSISTANT

## 2024-05-14 PROCEDURE — 36415 COLL VENOUS BLD VENIPUNCTURE: CPT

## 2024-05-14 PROCEDURE — 99232 SBSQ HOSP IP/OBS MODERATE 35: CPT | Performed by: INTERNAL MEDICINE

## 2024-05-14 RX ORDER — CLOPIDOGREL BISULFATE 75 MG/1
75 TABLET ORAL DAILY
Qty: 30 TABLET | Refills: 3 | Status: SHIPPED | OUTPATIENT
Start: 2024-05-14

## 2024-05-14 RX ORDER — ROSUVASTATIN CALCIUM 10 MG/1
10 TABLET, COATED ORAL NIGHTLY
Status: DISCONTINUED | OUTPATIENT
Start: 2024-05-14 | End: 2024-05-14 | Stop reason: HOSPADM

## 2024-05-14 RX ORDER — ROSUVASTATIN CALCIUM 20 MG/1
20 TABLET, COATED ORAL NIGHTLY
Qty: 30 TABLET | Refills: 3 | Status: SHIPPED | OUTPATIENT
Start: 2024-05-14 | End: 2024-05-14 | Stop reason: HOSPADM

## 2024-05-14 RX ORDER — ROSUVASTATIN CALCIUM 10 MG/1
10 TABLET, COATED ORAL NIGHTLY
Qty: 30 TABLET | Refills: 1 | Status: CANCELLED | OUTPATIENT
Start: 2024-05-14

## 2024-05-14 RX ORDER — ROSUVASTATIN CALCIUM 20 MG/1
20 TABLET, COATED ORAL NIGHTLY
Status: DISCONTINUED | OUTPATIENT
Start: 2024-05-14 | End: 2024-05-14 | Stop reason: DRUGHIGH

## 2024-05-14 RX ORDER — ROSUVASTATIN CALCIUM 10 MG/1
10 TABLET, COATED ORAL NIGHTLY
Qty: 30 TABLET | Refills: 3 | Status: SHIPPED | OUTPATIENT
Start: 2024-05-14

## 2024-05-14 RX ORDER — HYDRALAZINE HYDROCHLORIDE 20 MG/ML
10 INJECTION INTRAMUSCULAR; INTRAVENOUS ONCE
Status: COMPLETED | OUTPATIENT
Start: 2024-05-14 | End: 2024-05-14

## 2024-05-14 RX ADMIN — SODIUM CHLORIDE, PRESERVATIVE FREE 10 ML: 5 INJECTION INTRAVENOUS at 10:01

## 2024-05-14 RX ADMIN — BISACODYL 5 MG: 5 TABLET, COATED ORAL at 10:03

## 2024-05-14 RX ADMIN — SERTRALINE HYDROCHLORIDE 200 MG: 100 TABLET ORAL at 10:03

## 2024-05-14 RX ADMIN — SEVELAMER CARBONATE 800 MG: 800 TABLET, FILM COATED ORAL at 11:29

## 2024-05-14 RX ADMIN — OXYCODONE AND ACETAMINOPHEN 1 TABLET: 10; 325 TABLET ORAL at 02:48

## 2024-05-14 RX ADMIN — HYDRALAZINE HYDROCHLORIDE 10 MG: 20 INJECTION, SOLUTION INTRAMUSCULAR; INTRAVENOUS at 11:57

## 2024-05-14 RX ADMIN — LEVOTHYROXINE SODIUM 175 MCG: 0.15 TABLET ORAL at 06:29

## 2024-05-14 RX ADMIN — DOCUSATE SODIUM 100 MG: 100 CAPSULE, LIQUID FILLED ORAL at 10:02

## 2024-05-14 RX ADMIN — CLOPIDOGREL BISULFATE 75 MG: 75 TABLET ORAL at 10:01

## 2024-05-14 RX ADMIN — SEVELAMER CARBONATE 800 MG: 800 TABLET, FILM COATED ORAL at 10:02

## 2024-05-14 RX ADMIN — PRIMIDONE 50 MG: 50 TABLET ORAL at 10:02

## 2024-05-14 RX ADMIN — ASPIRIN 81 MG: 81 TABLET, COATED ORAL at 10:01

## 2024-05-14 RX ADMIN — CARVEDILOL 25 MG: 25 TABLET, FILM COATED ORAL at 10:03

## 2024-05-14 RX ADMIN — PANTOPRAZOLE SODIUM 40 MG: 40 TABLET, DELAYED RELEASE ORAL at 06:29

## 2024-05-14 ASSESSMENT — PAIN DESCRIPTION - FREQUENCY: FREQUENCY: CONTINUOUS

## 2024-05-14 ASSESSMENT — PAIN DESCRIPTION - ORIENTATION
ORIENTATION: LOWER
ORIENTATION: LOWER

## 2024-05-14 ASSESSMENT — PAIN DESCRIPTION - LOCATION
LOCATION: BACK
LOCATION: BACK

## 2024-05-14 ASSESSMENT — PAIN DESCRIPTION - PAIN TYPE: TYPE: CHRONIC PAIN

## 2024-05-14 ASSESSMENT — PAIN SCALES - GENERAL
PAINLEVEL_OUTOF10: 9
PAINLEVEL_OUTOF10: 9
PAINLEVEL_OUTOF10: 6

## 2024-05-14 ASSESSMENT — PAIN DESCRIPTION - DESCRIPTORS
DESCRIPTORS: ACHING
DESCRIPTORS: ACHING

## 2024-05-14 ASSESSMENT — PAIN DESCRIPTION - ONSET: ONSET: ON-GOING

## 2024-05-14 NOTE — DISCHARGE INSTRUCTIONS
F/up dr casarez 3-4 weeks  14 day event monitor upon dc    Discharge Instructions for Radial Heart Catherization    1.  Take it easy for 3-4 days.  2.  No driving for 2 days.  3.  No lifting of 5 lbs or more for 5 days with the affected arm.  4.  Can shower after 24 hours.  5.  Remove arm board after 24 hours.  6.  Apply a band aid to the insertion site daily for 5 days.  May apply antibiotic ointment if desired, but not necessary.  Wash site daily with soap and water.  7.  No creams, ointments, or powders near the insertion site.   8.  No tub baths, swimming, hot tubs, or hand washing dishes for 1 week.  9.  Watch for signs of infection (redness, warmth, swelling, or pus drainage) or coolness of extremity and call physician if this occurs  10.  If bleeding occurs from insertion site, apply pressure and call 911.

## 2024-05-14 NOTE — PLAN OF CARE
Problem: Pain  Goal: Verbalizes/displays adequate comfort level or baseline comfort level  Flowsheets (Taken 5/14/2024 0014)  Verbalizes/displays adequate comfort level or baseline comfort level:   Encourage patient to monitor pain and request assistance   Administer analgesics based on type and severity of pain and evaluate response   Consider cultural and social influences on pain and pain management   Assess pain using appropriate pain scale   Implement non-pharmacological measures as appropriate and evaluate response     Problem: Discharge Planning  Goal: Discharge to home or other facility with appropriate resources  Flowsheets (Taken 5/14/2024 0014)  Discharge to home or other facility with appropriate resources:   Identify barriers to discharge with patient and caregiver   Identify discharge learning needs (meds, wound care, etc)     Problem: Cardiovascular - Adult  Goal: Maintains optimal cardiac output and hemodynamic stability  Flowsheets (Taken 5/14/2024 0014)  Maintains optimal cardiac output and hemodynamic stability:   Monitor blood pressure and heart rate   Monitor urine output and notify Licensed Independent Practitioner for values outside of normal range   Assess for signs of decreased cardiac output     Problem: Cardiovascular - Adult  Goal: Absence of cardiac dysrhythmias or at baseline  Flowsheets (Taken 5/14/2024 0014)  Absence of cardiac dysrhythmias or at baseline:   Monitor cardiac rate and rhythm   Assess for signs of decreased cardiac output     Problem: Safety - Adult  Goal: Free from fall injury  Flowsheets (Taken 5/14/2024 0014)  Free From Fall Injury: Instruct family/caregiver on patient safety       Care plan reviewed with patient.  Patient verbalize understanding of the plan of care and contribute to goal setting.

## 2024-05-14 NOTE — PROGRESS NOTES
Inpatient Cardiac Rehabilitation Consult    Received consult for Phase II Cardiac Rehabilitation.  Patient needs cardiac rehab due to PCI on 5/13/24.  Importance of Cardiac Rehab discussed with patient.  Reviewed cardiac rehab class times.  Patient questions answered.  We will contact patient at home next week.  He does dialysis at home 4 days a week.  Cardiac Rehab brochure given.

## 2024-05-14 NOTE — ED PROVIDER NOTES
Pt Name: Farhad Myles  MRN: [id number]  Birthdate 1960  Date of evaluation: 5/9/2024  Provider: NAYAN STAUFFER DO, FACEP     CHIEF COMPLAINT            Chief Complaint   Patient presents with    Shortness of Breath    Palpitations      HISTORY OF PRESENT ILLNESS   Farhad Myles is a pleasant 63 y.o. male who presents to the emergency department for evaluation of abnormal dialysate.  Reportedly, his dialysis nurse called in, stating that there is blood in the dialysate, with concern for possibility of hemolysis.  Therefore he was sent to the ED to be evaluated.  Patient states that he has been feeling shortness of breath over the past 2 weeks, this has been progressive.  He has noticed his oxygen saturations are in the low 80s at home and has been needing to wear his oxygen at home.  He has been maintaining himself on 2 L via the last week or so, as without it he gets markedly winded.  He has also had some quite a bit of fatigue, as well as some occasional chest tightness and palpitations     PASTMEDICAL HISTORY/Co-Morbid Conditions:      Past Medical History        Past Medical History:   Diagnosis Date    Arthritis      Back problem       back pain-sees [facility] pain mgmt    Bladder disease       Dr. Allison    CHF with unknown LVEF ([facility]) 05/24/2021     Dr. George/CHF clinic    Chronic kidney disease      Constipation      History of diabetes mellitus       resolved with weight loss    Hypertension      Hypertensive emergency 04/11/2019    Hypertensive urgency 04/13/2019    Sleep apnea       has bipap-sees AZRA Olmedo CNP    Thyroid disease                   Patient Active Problem List   Diagnosis Code    Allergy to bee sting Z91.030    Primary hypertension I10    Rheumatoid arteritis (HCC) M05.20    Hypothyroidism E03.9    Gastroenteritis K52.9    Obstructive sleep apnea G47.33    Small intestinal bacterial overgrowth K63.8219    Acute diarrhea R19.7    Generalized abdominal pain R10.84    Nausea and vomiting

## 2024-05-14 NOTE — PROGRESS NOTES
[facility]   Nephrology progress note  5/14/2024, 10:57 AM      Pt Name:    Farhad Myles  MRN:     [id number]     YOB: 1960  Admit Date:    5/9/2024  7:05 PM    Chief Complaint: Nephrology following for ESRD and hemodialysis    Subjective:  Patient was seen and examined this morning  No chest pain or shortness of breath      Objective:  24HR INTAKE/OUTPUT:    Intake/Output Summary (Last 24 hours) at 5/14/2024 1057  Last data filed at 5/14/2024 0300  Gross per 24 hour   Intake 700 ml   Output 3710 ml   Net -3010 ml           I/O last 3 completed shifts:  In: 880 [P.O.:480]  Out: 3710 [Urine:300; Blood:10]  No intake/output data recorded.   Admission weight: (!) 137.9 kg (304 lb 0.2 oz)    Wt Readings from Last 3 Encounters:   05/14/24 131.8 kg (290 lb 9.1 oz)   04/16/24 (!) 140.2 kg (309 lb 1.4 oz)   04/11/24 (!) 140.2 kg (309 lb)        Vitals :   Vitals:    05/13/24 2358 05/14/24 0300 05/14/24 0656 05/14/24 0945   BP: (!) 148/60 (!) 139/53  (!) 148/58   Pulse:  58  62   Resp:  18  20   Temp:  97.7 °F (36.5 °C)  97.7 °F (36.5 °C)   TempSrc:  Oral  Oral   SpO2: 95% 92%  95%   Weight:   131.8 kg (290 lb 9.1 oz)    Height:           Physical examination  General Appearance: alert and cooperative with exam, appears comfortable, no distress  Mouth/Throat: Oral mucosa moist  Neck: No JVD visibly noted  Lungs:  no use of accessory muscles    Medications:  Infusion:    sodium chloride      sodium chloride      nitroGLYCERIN Stopped (05/11/24 1549)     Meds:    rosuvastatin  10 mg Oral Nightly    clopidogrel  75 mg Oral Daily    sodium chloride flush  5-40 mL IntraVENous 2 times per day    senna  1 tablet Oral Nightly    gabapentin  300 mg Oral Nightly    aspirin EC  81 mg Oral Daily    carvedilol  25 mg Oral BID WC    levothyroxine  175 mcg Oral Daily    montelukast  10 mg Oral Nightly    multivitamin  1 tablet Oral Nightly    pantoprazole  40 mg Oral QAM AC    primidone  50 mg Oral

## 2024-05-14 NOTE — PROGRESS NOTES
Patient discharged home with spouse. Patient verbalizes understanding of discharge instructions. Chart tore down and placed in yellow bin.

## 2024-05-14 NOTE — CARE COORDINATION
5/14/24, 8:04 AM EDT    DISCHARGE ON GOING EVALUATION    Farhad MONGE [facility] day: 5  Location: 8A-04/004-A Reason for admit: Acute pulmonary edema (HCC) [J81.0]  Chest pain [R07.9]  Hypothyroidism, unspecified type [E03.9]     Procedures: 5/13 LHC: Severe mid and distal LAD stenoses, s/p successful PCI with KAVITA*2.     Imaging since last note: 5/10 Stress Test: The study is positive for myocardial ischemia and infarction. Findings suggest a moderate risk of cardiac events. Stress Function: Post-stress ejection fraction is 39%.  5/10 ECHO: Mildly reduced left ventricular systolic function with a visually estimated EF of 45 - 50%. Left ventricle is mildly dilated. Normal wall thickness. Normal wall motion. Indeterminate diastolic function.     Barriers to Discharge: Hospitalist, Nephrology, an Cardiology following. HD yesterday removing 3 L fluid. LHC yesterday with PCI.     PCP: No primary care provider on file.  Readmission Risk Score: 21.9    Patient Goals/Plan/Treatment Preferences: From home with wife. Wears 2-4L O2 and Bipap from [facility]. Completed home HD M-T-T-F in connection with [facility].       5/14/24, 12:04 PM EDT    Patient goals/plan/ treatment preferences discussed by  and .  Patient goals/plan/ treatment preferences reviewed with patient/ family.  Patient/ family verbalize understanding of discharge plan and are in agreement with goal/plan/treatment preferences.  Understanding was demonstrated using the teach back method.  AVS provided by RN at time of discharge, which includes all necessary medical information pertaining to the patients current course of illness, treatment, post-discharge goals of care, and treatment preferences.     Services At/After Discharge: None (new)         
following:;Cooking;Housework;Shopping   Current Functional Level Assistance with the following:;Cooking;Housework;Shopping   Can patient return to prior living arrangement Yes   Ability to make needs known: Good   Family able to assist with home care needs: Yes   Would you like for me to discuss the discharge plan with any other family members/significant others, and if so, who? No   Financial Resources Medicare   Community Resources Other (Comment)  (hemodialysis at home from [facility])   Discharge Planning   Type of Residence House   Living Arrangements Spouse/Significant Other   Current Services Prior To Admission Home Bipap;Oxygen Therapy;Other (Comment);Durable Medical Equipment  (Hemodialysis at home M-Tu-Th-Fr)   Current DME Prior to Arrival Oxygen Therapy (Comment);Cane  (2-4 L O2, bleed in to Bipap at night)   Potential Assistance Needed N/A   DME Ordered? No   Potential Assistance Purchasing Medications No   Type of Home Care Services None   Patient expects to be discharged to: House   One/Two Story Residence Two story   Services At/After Discharge   Transition of Care Consult (CM Consult) Discharge Planning   Mode of Transport at Discharge Other (see comment)  (wife)   Confirm Follow Up Transport Self   Condition of Participation: Discharge Planning   The Plan for Transition of Care is related to the following treatment goals: complete cardiac work up

## 2024-05-14 NOTE — PROGRESS NOTES
Cardiology Progress Note      Patient:  Farhad Myles  YOB: 1960  MRN: [id number]   Acct: [id number]  Admit Date:  5/9/2024  Primary Cardiologist: Marco George MD    Note per dr fischer \"CHIEF COMPLAINT: blood in dialysate         HPI: This is a pleasant 63 y.o. male presented to the ED with complaints of blood in his dialysate and concerns for hemolysis.  He also complains of heart palpitations, chest pressure, and worsening shortness of breath over the past 2 weeks.  Chest pressure has been occurring up to a couple times per day and is not associated with exercise or worsening shortness of breath.  Chest pressure lasts about 2 minutes at a time and self resolved.  He also complains of palpitations over the past 2 weeks as well.  Palpitations lasted for 20 seconds and self resolved.  He says his blood pressure has also been high for the past 2 weeks. He reports using a home dialysis machine and says he has been missing 1-2 treatments per week because he feels bad after he completes dialysis.Troponins were trended with a flat trend of 63, 59, 62.  proBNP was markedly elevated above baseline at 7[address], and chest x-ray did show interstitial pulmonary edema.\"    Subjective (Events in last 24 hours): pt awake and alert.  NAD. No cp or sob. No edema or orthopnea  On 2 l/min O2      Objective:   BP (!) 139/53   Pulse 58   Temp 97.7 °F (36.5 °C) (Oral)   Resp 18   Ht 1.854 m (6' 1\")   Wt 131.8 kg (290 lb 9.1 oz)   SpO2 92%   BMI 38.34 kg/m²        TELEMETRY: nsr 60s    Physical Exam:  General Appearance: alert and oriented to person, place and time, in no acute distress  Cardiovascular: normal rate, regular rhythm, normal S1 and S2, no murmurs, rubs, clicks, or gallops, distal pulses intact, no carotid bruits, no JVD  Pulmonary/Chest: clear to auscultation bilaterally- no wheezes, rales or rhonchi, normal air movement, no respiratory distress  Abdomen: soft, non-tender, non-distended, normal bowel

## 2024-05-16 ENCOUNTER — TELEPHONE (OUTPATIENT)
Dept: CARDIAC REHAB | Age: 64
End: 2024-05-16

## 2024-05-16 NOTE — TELEPHONE ENCOUNTER
Spoke to pt on the phone, pt is not ready to schedule cardiac rehab evaluation yet due to dialysis 4 days a week. Pt will call us if/when he is ready to schedule

## 2024-05-21 ENCOUNTER — OFFICE VISIT (OUTPATIENT)
Dept: INTERNAL MEDICINE CLINIC | Age: 64
End: 2024-05-21
Payer: MEDICARE

## 2024-05-21 VITALS
HEIGHT: 73 IN | BODY MASS INDEX: 41.14 KG/M2 | DIASTOLIC BLOOD PRESSURE: 76 MMHG | HEART RATE: 82 BPM | OXYGEN SATURATION: 91 % | WEIGHT: 310.4 LBS | SYSTOLIC BLOOD PRESSURE: 148 MMHG

## 2024-05-21 DIAGNOSIS — M25.562 CHRONIC PAIN OF BOTH KNEES: ICD-10-CM

## 2024-05-21 DIAGNOSIS — M48.062 SPINAL STENOSIS OF LUMBAR REGION WITH NEUROGENIC CLAUDICATION: ICD-10-CM

## 2024-05-21 DIAGNOSIS — I50.9 CHF (NYHA CLASS III, ACC/AHA STAGE C) (HCC): ICD-10-CM

## 2024-05-21 DIAGNOSIS — G89.29 CHRONIC PAIN OF BOTH KNEES: ICD-10-CM

## 2024-05-21 DIAGNOSIS — M25.561 CHRONIC PAIN OF BOTH KNEES: ICD-10-CM

## 2024-05-21 DIAGNOSIS — J96.11 CHRONIC RESPIRATORY FAILURE WITH HYPOXIA (HCC): ICD-10-CM

## 2024-05-21 DIAGNOSIS — G89.4 CHRONIC PAIN SYNDROME: ICD-10-CM

## 2024-05-21 DIAGNOSIS — I25.119 CORONARY ARTERY DISEASE INVOLVING NATIVE CORONARY ARTERY OF NATIVE HEART WITH ANGINA PECTORIS (HCC): ICD-10-CM

## 2024-05-21 DIAGNOSIS — M47.816 LUMBAR FACET ARTHROPATHY: ICD-10-CM

## 2024-05-21 DIAGNOSIS — M17.0 PRIMARY OSTEOARTHRITIS OF BOTH KNEES: ICD-10-CM

## 2024-05-21 DIAGNOSIS — M17.0 PRIMARY OSTEOARTHRITIS OF BOTH KNEES: Primary | ICD-10-CM

## 2024-05-21 DIAGNOSIS — M47.816 SPONDYLOSIS OF LUMBAR REGION WITHOUT MYELOPATHY OR RADICULOPATHY: ICD-10-CM

## 2024-05-21 DIAGNOSIS — G62.9 NEUROPATHY: ICD-10-CM

## 2024-05-21 PROCEDURE — 3078F DIAST BP <80 MM HG: CPT

## 2024-05-21 PROCEDURE — G8417 CALC BMI ABV UP PARAM F/U: HCPCS

## 2024-05-21 PROCEDURE — 99213 OFFICE O/P EST LOW 20 MIN: CPT

## 2024-05-21 PROCEDURE — G8427 DOCREV CUR MEDS BY ELIG CLIN: HCPCS

## 2024-05-21 PROCEDURE — 3077F SYST BP >= 140 MM HG: CPT

## 2024-05-21 PROCEDURE — 1111F DSCHRG MED/CURRENT MED MERGE: CPT

## 2024-05-21 PROCEDURE — 3017F COLORECTAL CA SCREEN DOC REV: CPT

## 2024-05-21 PROCEDURE — L1812 KO ELASTIC W/JOINTS PRE OTS: HCPCS

## 2024-05-21 PROCEDURE — 4004F PT TOBACCO SCREEN RCVD TLK: CPT

## 2024-05-21 RX ORDER — OXYCODONE AND ACETAMINOPHEN 10; 325 MG/1; MG/1
1 TABLET ORAL EVERY 8 HOURS PRN
Qty: 90 TABLET | Refills: 0 | Status: SHIPPED | OUTPATIENT
Start: 2024-05-21 | End: 2024-06-20

## 2024-05-21 ASSESSMENT — ENCOUNTER SYMPTOMS
ABDOMINAL PAIN: 0
COUGH: 0
RESPIRATORY NEGATIVE: 1
CHEST TIGHTNESS: 0
BLOOD IN STOOL: 0

## 2024-05-21 NOTE — PROGRESS NOTES
05/14/2024     05/14/2024    CREATININE 4.0 (HH) 05/14/2024    BUN 24 (H) 05/14/2024    CO2 24 05/14/2024    TSH 22.140 (H) 05/09/2024    PSA 2.22 (H) 04/09/2024    INR 1.03 05/13/2024    LABA1C 4.8 08/10/2023         Assessment and plan created with review by Dr. Baljinder DO.    An electronic signature was used to authenticate this note.    Electronically signed by Tanner Walker MD on 5/21/2024 at 3:58 PM  [facility] INTERNAL MEDICINE  [address] [place] [address]  Dept: [phone]  Dept Fax: [phone]  Loc: [phone]

## 2024-05-21 NOTE — TELEPHONE ENCOUNTER
OARRS reviewed. UDS: + for  . Phenobarbitol, oxycodone,sertraline,gabapentin  Last seen: 4/16/2024. Follow-up:   Future Appointments   Date Time Provider Department Center   5/21/2024  2:20 PM Tanner Walker MD [facility]PX Physic [facility] - [place]   6/12/2024  3:45 PM Mortimer, Connor, PA-C N SRPX Heart P - [place]   6/17/2024 12:15 PM Joel Holcomb APRN - CNP N SRPX Pain P - [place]   6/25/2024  1:00 PM Tanner Walker MD SRPX Physic P - [place]   10/10/2024  2:00 PM Cally Yang APRN - CNP N SRPX Del U P - [place]   10/21/2024  1:20 PM Saúl Alvarez MD SRPX Physic P - [place]   3/27/2025  2:00 PM Marco George MD N [facility] Heart [facility]

## 2024-05-21 NOTE — PATIENT INSTRUCTIONS
Referral to Orthopedic Surgery  Knee braces ordered  Continue with DAPT/statin - cardio follow-up in 2 weeks

## 2024-05-26 DIAGNOSIS — I50.43 CHF (CONGESTIVE HEART FAILURE), NYHA CLASS III, ACUTE ON CHRONIC, COMBINED (HCC): ICD-10-CM

## 2024-05-26 PROBLEM — J96.01 ACUTE HYPOXEMIC RESPIRATORY FAILURE (HCC): Status: RESOLVED | Noted: 2022-04-20 | Resolved: 2024-05-26

## 2024-05-26 PROBLEM — J81.0 ACUTE PULMONARY EDEMA (HCC): Status: RESOLVED | Noted: 2024-05-10 | Resolved: 2024-05-26

## 2024-05-26 PROBLEM — J96.01 ACUTE RESPIRATORY FAILURE WITH HYPOXIA (HCC): Status: RESOLVED | Noted: 2021-03-02 | Resolved: 2024-05-26

## 2024-05-26 PROBLEM — E87.79 OTHER FLUID OVERLOAD: Status: RESOLVED | Noted: 2023-03-24 | Resolved: 2024-05-26

## 2024-05-26 PROBLEM — I50.9 ACUTE ON CHRONIC CONGESTIVE HEART FAILURE (HCC): Status: RESOLVED | Noted: 2022-04-21 | Resolved: 2024-05-26

## 2024-05-26 PROBLEM — R19.7 ACUTE DIARRHEA: Status: RESOLVED | Noted: 2017-09-07 | Resolved: 2024-05-26

## 2024-05-28 DIAGNOSIS — E03.9 HYPOTHYROIDISM, UNSPECIFIED TYPE: Primary | ICD-10-CM

## 2024-05-28 RX ORDER — CARVEDILOL 25 MG/1
25 TABLET ORAL 2 TIMES DAILY
Qty: 180 TABLET | Refills: 3 | Status: SHIPPED | OUTPATIENT
Start: 2024-05-28

## 2024-06-02 LAB — ECHO BSA: 2.66 M2

## 2024-06-10 ENCOUNTER — TELEPHONE (OUTPATIENT)
Dept: INTERNAL MEDICINE CLINIC | Age: 64
End: 2024-06-10

## 2024-06-10 DIAGNOSIS — R09.02 HYPOXIA: ICD-10-CM

## 2024-06-10 DIAGNOSIS — I50.9 CHF (NYHA CLASS III, ACC/AHA STAGE C) (HCC): Primary | ICD-10-CM

## 2024-06-10 NOTE — TELEPHONE ENCOUNTER
Pt needs a new order for Oxygen. CHF clinic was following but he no longer is going there. He sees Pulmonology for Sleep but will need a referral for pulmonology to assess oxygen and get new orders written. OK to refer?

## 2024-06-11 NOTE — PROGRESS NOTES
[facility] PROFESSIONAL SERVICES  [facility] [place] [address]   [address]   [facility] [address]   Dept: [phone]   Dept Fax: [phone]   Loc: [phone]      Chief Complaint   Patient presents with    Palpitations     Intermittently     Chest Pain    Follow-Up from Hospital     F/u from heart cath and stent placement    Dizziness     Cardiologist:  Dr. George  64 yo male presents for hfu, chest pain/usa, elevated trop, abn stress, s/p PCI to LAD. EF 45-50%. Hx of cardiorenal on dialysis. EF 55%, DM2, ESRD on dialysis, deisy on bipap.     Patient's symptoms havent changed much since PCI. Still with chronic fatigue and sob. Occ dizziness, seems to be tolerating dialysis, doing this 4-5 days per week currently. BP has been okay lately. He seems somewhat frustrated by his condition but appreciative of the discussion today.     General:   No fever, no chills, no weight loss, + fatigue  Pulmonary:    + dyspnea, no wheezing  Cardiac:    + recent chest pain   GI:     No nausea or vomiting, no abdominal pain  Neuro:     + dizziness or light headedness  Musculoskeletal:  No recent active issues  Extremities:   trace edema      Past Medical History:   Diagnosis Date    Arthritis     Back problem     back pain-sees [facility] pain mgmt    Bladder disease     Dr. Allison    CHF with unknown LVEF ([facility]) 05/24/2021    Dr. George/CHF clinic    Chronic kidney disease     Constipation     Coronary artery disease involving native coronary artery of native heart with angina pectoris ([facility]) 5/21/2024    History of diabetes mellitus     resolved with weight loss    Hypertension     Hypertensive emergency 04/11/2019    Hypertensive urgency 04/13/2019    Sleep apnea     has bipap-sees AZRA Olmedo CNP    Thyroid disease        Allergies   Allergen Reactions    Azithromycin Itching     Hands swell and blister      Shellfish-Derived Products Swelling     Tolerates IV dye without any problems      Pcn [Penicillins] Itching

## 2024-06-12 ENCOUNTER — OFFICE VISIT (OUTPATIENT)
Dept: CARDIOLOGY CLINIC | Age: 64
End: 2024-06-12
Payer: MEDICARE

## 2024-06-12 VITALS
BODY MASS INDEX: 39.68 KG/M2 | HEIGHT: 73 IN | DIASTOLIC BLOOD PRESSURE: 66 MMHG | HEART RATE: 60 BPM | SYSTOLIC BLOOD PRESSURE: 142 MMHG | WEIGHT: 299.38 LBS

## 2024-06-12 DIAGNOSIS — I25.119 CORONARY ARTERY DISEASE INVOLVING NATIVE CORONARY ARTERY OF NATIVE HEART WITH ANGINA PECTORIS (HCC): Primary | ICD-10-CM

## 2024-06-12 DIAGNOSIS — I50.9 CHF (NYHA CLASS III, ACC/AHA STAGE C) (HCC): ICD-10-CM

## 2024-06-12 DIAGNOSIS — I10 ESSENTIAL HYPERTENSION: ICD-10-CM

## 2024-06-12 PROCEDURE — 3077F SYST BP >= 140 MM HG: CPT | Performed by: STUDENT IN AN ORGANIZED HEALTH CARE EDUCATION/TRAINING PROGRAM

## 2024-06-12 PROCEDURE — G8417 CALC BMI ABV UP PARAM F/U: HCPCS | Performed by: STUDENT IN AN ORGANIZED HEALTH CARE EDUCATION/TRAINING PROGRAM

## 2024-06-12 PROCEDURE — 3017F COLORECTAL CA SCREEN DOC REV: CPT | Performed by: STUDENT IN AN ORGANIZED HEALTH CARE EDUCATION/TRAINING PROGRAM

## 2024-06-12 PROCEDURE — 3078F DIAST BP <80 MM HG: CPT | Performed by: STUDENT IN AN ORGANIZED HEALTH CARE EDUCATION/TRAINING PROGRAM

## 2024-06-12 PROCEDURE — 1111F DSCHRG MED/CURRENT MED MERGE: CPT | Performed by: STUDENT IN AN ORGANIZED HEALTH CARE EDUCATION/TRAINING PROGRAM

## 2024-06-12 PROCEDURE — 99214 OFFICE O/P EST MOD 30 MIN: CPT | Performed by: STUDENT IN AN ORGANIZED HEALTH CARE EDUCATION/TRAINING PROGRAM

## 2024-06-12 PROCEDURE — G8428 CUR MEDS NOT DOCUMENT: HCPCS | Performed by: STUDENT IN AN ORGANIZED HEALTH CARE EDUCATION/TRAINING PROGRAM

## 2024-06-12 PROCEDURE — 4004F PT TOBACCO SCREEN RCVD TLK: CPT | Performed by: STUDENT IN AN ORGANIZED HEALTH CARE EDUCATION/TRAINING PROGRAM

## 2024-06-12 NOTE — PROGRESS NOTES
[place] [facility] Pulmonary, Critical Care and Sleep Medicine      Farhad Myles         [id number]  6/13/2024   Chief Complaint   Patient presents with    Follow-up     LAURIE 6 month f/u with [facility] download.         Pt of DAYSI Steve     Assessment/Plan   1. LAURIE (obstructive sleep apnea)  -Yearly supply order placed? Yes  -Download reviewed and discussed with patient.  -The symptoms of LAURIE have improved. The patient is benefiting from therapy and should continue use of PAP device. I have reviewed the download.   -Patient's symptoms and AHI are not optimally controlled with current settings of 16/12 cmH2O. Patient feels like he needs more pressure. Adjust current pressure settings to 17/13 cmH2O. DME to pull download in 2 weeks.   -Advised to continue current positive airway pressure therapy with above described pressure.   -Advised to keep good compliance with current recommended pressure to get optimal results and clinical improvement  -Recommend 7-9 hours of sleep with PAP  -Instructed to call DME company regarding supplies if needed.   -Patient to call my office for earlier appointment if needed for worsening of sleep symptoms.   -Patient is not to drive if feeling sleepy    2. RLS (restless legs syndrome)  -Good benefit with requip. Continue at current dose    3. Other insomnia  -Good benefit with melatonin. Continue at current dose     4. Dyspnea on exertion  -Referral to pulm for further evaluation with pulmonary function test prior    5. Chronic diastolic heart failure (HCC)  -Patient reports he is no longer following with CHF clinic. Continue to monitor leg edema and weight gain    6. Decreased diffusion capacity of lung  -Follow on pulmonary function test    7. Chronic respiratory failure with hypoxia (HCC)  -Patient reports his CHF provider is no longer seeing him and is no longer prescribing his O2. He wishes for pulmonary referral for her chronic respiratory failure. Orders placed     8. Morbid

## 2024-06-13 ENCOUNTER — OFFICE VISIT (OUTPATIENT)
Dept: PULMONOLOGY | Age: 64
End: 2024-06-13
Payer: MEDICARE

## 2024-06-13 VITALS
OXYGEN SATURATION: 96 % | DIASTOLIC BLOOD PRESSURE: 60 MMHG | HEIGHT: 73 IN | WEIGHT: 309.2 LBS | BODY MASS INDEX: 40.98 KG/M2 | HEART RATE: 62 BPM | SYSTOLIC BLOOD PRESSURE: 128 MMHG | TEMPERATURE: 97.7 F

## 2024-06-13 DIAGNOSIS — G47.09 OTHER INSOMNIA: ICD-10-CM

## 2024-06-13 DIAGNOSIS — E66.01 MORBID OBESITY WITH BMI OF 40.0-44.9, ADULT (HCC): ICD-10-CM

## 2024-06-13 DIAGNOSIS — I50.32 CHRONIC DIASTOLIC HEART FAILURE (HCC): ICD-10-CM

## 2024-06-13 DIAGNOSIS — R06.09 DYSPNEA ON EXERTION: ICD-10-CM

## 2024-06-13 DIAGNOSIS — G47.33 OSA (OBSTRUCTIVE SLEEP APNEA): Primary | ICD-10-CM

## 2024-06-13 DIAGNOSIS — G25.81 RLS (RESTLESS LEGS SYNDROME): ICD-10-CM

## 2024-06-13 DIAGNOSIS — R94.2 DECREASED DIFFUSION CAPACITY OF LUNG: ICD-10-CM

## 2024-06-13 DIAGNOSIS — J96.11 CHRONIC RESPIRATORY FAILURE WITH HYPOXIA (HCC): ICD-10-CM

## 2024-06-13 PROCEDURE — 1111F DSCHRG MED/CURRENT MED MERGE: CPT

## 2024-06-13 PROCEDURE — 3017F COLORECTAL CA SCREEN DOC REV: CPT

## 2024-06-13 PROCEDURE — 3078F DIAST BP <80 MM HG: CPT

## 2024-06-13 PROCEDURE — G8427 DOCREV CUR MEDS BY ELIG CLIN: HCPCS

## 2024-06-13 PROCEDURE — 3074F SYST BP LT 130 MM HG: CPT

## 2024-06-13 PROCEDURE — 4004F PT TOBACCO SCREEN RCVD TLK: CPT

## 2024-06-13 PROCEDURE — 99214 OFFICE O/P EST MOD 30 MIN: CPT

## 2024-06-13 PROCEDURE — G8417 CALC BMI ABV UP PARAM F/U: HCPCS

## 2024-06-13 RX ORDER — ATORVASTATIN CALCIUM 40 MG/1
1 TABLET, FILM COATED ORAL
COMMUNITY
Start: 2024-03-11

## 2024-06-13 RX ORDER — CLOPIDOGREL BISULFATE 75 MG/1
75 TABLET ORAL DAILY
Qty: 90 TABLET | Refills: 3 | Status: SHIPPED | OUTPATIENT
Start: 2024-06-13

## 2024-06-13 RX ORDER — ROSUVASTATIN CALCIUM 10 MG/1
10 TABLET, COATED ORAL NIGHTLY
Qty: 90 TABLET | Refills: 3 | Status: SHIPPED | OUTPATIENT
Start: 2024-06-13

## 2024-06-13 ASSESSMENT — ENCOUNTER SYMPTOMS
SORE THROAT: 0
RHINORRHEA: 0
WHEEZING: 1
COUGH: 1
SHORTNESS OF BREATH: 1

## 2024-06-17 ENCOUNTER — OFFICE VISIT (OUTPATIENT)
Dept: PHYSICAL MEDICINE AND REHAB | Age: 64
End: 2024-06-17
Payer: MEDICARE

## 2024-06-17 VITALS
DIASTOLIC BLOOD PRESSURE: 70 MMHG | WEIGHT: 309.31 LBS | SYSTOLIC BLOOD PRESSURE: 132 MMHG | BODY MASS INDEX: 40.99 KG/M2 | HEIGHT: 73 IN

## 2024-06-17 DIAGNOSIS — M17.0 PRIMARY OSTEOARTHRITIS OF BOTH KNEES: Primary | ICD-10-CM

## 2024-06-17 DIAGNOSIS — I50.32 CHF (CONGESTIVE HEART FAILURE), NYHA CLASS II, CHRONIC, DIASTOLIC (HCC): ICD-10-CM

## 2024-06-17 DIAGNOSIS — G89.4 CHRONIC PAIN SYNDROME: ICD-10-CM

## 2024-06-17 DIAGNOSIS — G62.9 NEUROPATHY: ICD-10-CM

## 2024-06-17 DIAGNOSIS — M25.562 CHRONIC PAIN OF BOTH KNEES: ICD-10-CM

## 2024-06-17 DIAGNOSIS — M47.816 LUMBAR FACET ARTHROPATHY: ICD-10-CM

## 2024-06-17 DIAGNOSIS — M48.062 SPINAL STENOSIS OF LUMBAR REGION WITH NEUROGENIC CLAUDICATION: ICD-10-CM

## 2024-06-17 DIAGNOSIS — M47.816 SPONDYLOSIS OF LUMBAR REGION WITHOUT MYELOPATHY OR RADICULOPATHY: ICD-10-CM

## 2024-06-17 DIAGNOSIS — G89.29 CHRONIC PAIN OF BOTH KNEES: ICD-10-CM

## 2024-06-17 DIAGNOSIS — N18.4 CHRONIC KIDNEY DISEASE, STAGE IV (SEVERE) (HCC): ICD-10-CM

## 2024-06-17 DIAGNOSIS — M25.561 CHRONIC PAIN OF BOTH KNEES: ICD-10-CM

## 2024-06-17 PROCEDURE — 3017F COLORECTAL CA SCREEN DOC REV: CPT | Performed by: NURSE PRACTITIONER

## 2024-06-17 PROCEDURE — 3078F DIAST BP <80 MM HG: CPT | Performed by: NURSE PRACTITIONER

## 2024-06-17 PROCEDURE — 4004F PT TOBACCO SCREEN RCVD TLK: CPT | Performed by: NURSE PRACTITIONER

## 2024-06-17 PROCEDURE — 99214 OFFICE O/P EST MOD 30 MIN: CPT | Performed by: NURSE PRACTITIONER

## 2024-06-17 PROCEDURE — G8417 CALC BMI ABV UP PARAM F/U: HCPCS | Performed by: NURSE PRACTITIONER

## 2024-06-17 PROCEDURE — 3075F SYST BP GE 130 - 139MM HG: CPT | Performed by: NURSE PRACTITIONER

## 2024-06-17 PROCEDURE — G8427 DOCREV CUR MEDS BY ELIG CLIN: HCPCS | Performed by: NURSE PRACTITIONER

## 2024-06-17 RX ORDER — GABAPENTIN 300 MG/1
600 CAPSULE ORAL NIGHTLY
Qty: 60 CAPSULE | Refills: 0 | Status: SHIPPED | OUTPATIENT
Start: 2024-06-24 | End: 2024-08-23

## 2024-06-17 RX ORDER — OXYCODONE AND ACETAMINOPHEN 10; 325 MG/1; MG/1
1 TABLET ORAL EVERY 8 HOURS PRN
Qty: 90 TABLET | Refills: 0 | Status: SHIPPED | OUTPATIENT
Start: 2024-06-20 | End: 2024-07-20

## 2024-06-17 ASSESSMENT — ENCOUNTER SYMPTOMS
APNEA: 1
COUGH: 1
CHOKING: 0
BACK PAIN: 1
WHEEZING: 0
STRIDOR: 0
CONSTIPATION: 0
SHORTNESS OF BREATH: 1
ABDOMINAL PAIN: 0
GASTROINTESTINAL NEGATIVE: 1
DIARRHEA: 0
CHEST TIGHTNESS: 0

## 2024-06-17 NOTE — PROGRESS NOTES
dialysis 5 times per week now- he does this at home now.       Radiology:  Lumbar xray:      FINDINGS:   Diffuse osteopenia is present. Straightening of the normal lumbar alignment is stable.     Moderate to severe diffuse joint space narrowing and bridging hypertrophic marginal spurring throughout the lumbar spine remains most pronounced at L5-S1. No fracture or compression deformity is observed. Bilateral sacroiliac joints are symmetric and   unremarkable.     Surgical clips project over the left hemiabdomen and lower pelvis. The visualized soft tissues are unremarkable.     IMPRESSION:  Stable appearance of the lumbar spine with moderate to severe hypertrophic vertebral body spondylolysis and multilevel degenerative disc disease which remains most pronounced at L5-S1. No fracture or acute bony abnormality visualized.     Left knee xray:      FINDINGS:   Moderate to severe tricompartmental joint space narrowing and marginal spurring is most pronounced in the patellofemoral compartment. No fracture or joint effusion is observed. Diffuse osteopenia is visualized. No focal bony lesions are present.     The visualized soft tissues are unremarkable.     IMPRESSION:  Moderate to severe chronic arthritic changes are noted. No fracture or acute bony abnormality is visualized.    Medications reviewed. Patient denies side effects with medications. Patient states he is taking medications as prescribed. Hedenies receiving pain medications from other sources. He  had 1 ER visit since last visit due to fluid overload.      Pain scale with out pain medications or at its worst is 9/10.  Pain scale with pain medications or at its best is 6-7/10.  Last dose of Percocet was today and Neurontin was last night   Drug screen reviewed from 4/16/2024 and was appropriate  Pill count completed  today and WNL: Yes      The patientis allergic to azithromycin, shellfish-derived products, pcn [penicillins], succinylcholine, sulfa antibiotics,  No

## 2024-06-18 ENCOUNTER — HOSPITAL ENCOUNTER (OUTPATIENT)
Dept: PULMONOLOGY | Age: 64
Discharge: HOME | End: 2024-06-18
Payer: MEDICARE

## 2024-06-18 DIAGNOSIS — J96.11 CHRONIC RESPIRATORY FAILURE WITH HYPOXIA (HCC): ICD-10-CM

## 2024-06-18 DIAGNOSIS — R94.2 DECREASED DIFFUSION CAPACITY OF LUNG: ICD-10-CM

## 2024-06-18 DIAGNOSIS — I50.32 CHRONIC DIASTOLIC HEART FAILURE (HCC): ICD-10-CM

## 2024-06-18 DIAGNOSIS — R06.09 DYSPNEA ON EXERTION: ICD-10-CM

## 2024-06-18 PROCEDURE — 94726 PLETHYSMOGRAPHY LUNG VOLUMES: CPT

## 2024-06-18 PROCEDURE — 94729 DIFFUSING CAPACITY: CPT

## 2024-06-18 PROCEDURE — 94010 BREATHING CAPACITY TEST: CPT

## 2024-06-26 NOTE — PROGRESS NOTES
[place] for Pulmonary Medicine and Critical Care    Patient: FARHAD DALLAS, 63 y.o.   : 1960  2024       Assessment/Plan   1. Restrictive lung disease with Decreased diffusion capacity of lung  -Restriction is new from previous pulmonary function test in  and DLCO has further declined. 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 Will obtain HRCT for further evaluation   -Reviewed preventative vaccinations    2. Chronic diastolic heart failure (HCC)  -Continue with cardio. Continue to monitor for edema and weight gain     3. Chronic respiratory failure with hypoxia (HCC)  -6 MWT - 2-3 lpm at rest and 3 lpm with exertion. O2 order placed  -Keep overnight pulse ox on room air on PAP tomorrow to evaluate for need for O2 bleed in  -Patient reports higher BiPAP pressure has felt better. Advised better compliance with PAP     4. Morbid obesity with BMI of 40.0-44.9, adult ([facility])  -Discussed weight loss    5. Exposure to asbestos  -See #1    6. Passive smoke exposure  -Encouraged patient to avoid passive smoke exposure     Advised patient to call office with any changes, questions, or concerns regarding respiratory status or issues with prescribed medications    Return in about 4 weeks (around 2024) for RLD & decreased DLCO with HRCT prior.           Subjective     Chief Complaint   Patient presents with    Shortness of Breath        HPI  Farhad is here for evaluation of shortness of breath/respiratory failure with referral from myself in pulm clinic . Patient reports that shortness of breath has been ongoing for years. Denies history of allergies. Admits to orthopnea. Denies history of PE or DVT. Patient's past medical history is significant for sleep apnea, arthritis, CHF, CKD, CAD, DM, hypertension, thyroid disease.    Pulmonary function test 24 - restriction and decreased DLCO    Chest x-ray 24 - pulm edema vs atypical infection     Patient was prescribed albuterol however he does

## 2024-07-01 ENCOUNTER — OFFICE VISIT (OUTPATIENT)
Dept: PULMONOLOGY | Age: 64
End: 2024-07-01
Payer: MEDICARE

## 2024-07-01 ENCOUNTER — TELEPHONE (OUTPATIENT)
Dept: PULMONOLOGY | Age: 64
End: 2024-07-01

## 2024-07-01 VITALS
OXYGEN SATURATION: 92 % | DIASTOLIC BLOOD PRESSURE: 80 MMHG | HEART RATE: 78 BPM | TEMPERATURE: 97.8 F | SYSTOLIC BLOOD PRESSURE: 138 MMHG

## 2024-07-01 DIAGNOSIS — Z77.090 EXPOSURE TO ASBESTOS: ICD-10-CM

## 2024-07-01 DIAGNOSIS — J98.4 RESTRICTIVE LUNG DISEASE: Primary | ICD-10-CM

## 2024-07-01 DIAGNOSIS — Z77.22 PASSIVE SMOKE EXPOSURE: ICD-10-CM

## 2024-07-01 DIAGNOSIS — J96.11 CHRONIC RESPIRATORY FAILURE WITH HYPOXIA (HCC): ICD-10-CM

## 2024-07-01 DIAGNOSIS — E66.01 MORBID OBESITY WITH BMI OF 40.0-44.9, ADULT (HCC): ICD-10-CM

## 2024-07-01 DIAGNOSIS — I50.32 CHRONIC DIASTOLIC HEART FAILURE (HCC): ICD-10-CM

## 2024-07-01 DIAGNOSIS — R94.2 DECREASED DIFFUSION CAPACITY OF LUNG: ICD-10-CM

## 2024-07-01 PROCEDURE — 94618 PULMONARY STRESS TESTING: CPT

## 2024-07-01 PROCEDURE — 99214 OFFICE O/P EST MOD 30 MIN: CPT

## 2024-07-01 PROCEDURE — 3079F DIAST BP 80-89 MM HG: CPT

## 2024-07-01 PROCEDURE — 3017F COLORECTAL CA SCREEN DOC REV: CPT

## 2024-07-01 PROCEDURE — G8427 DOCREV CUR MEDS BY ELIG CLIN: HCPCS

## 2024-07-01 PROCEDURE — G8417 CALC BMI ABV UP PARAM F/U: HCPCS

## 2024-07-01 PROCEDURE — 3075F SYST BP GE 130 - 139MM HG: CPT

## 2024-07-01 PROCEDURE — 4004F PT TOBACCO SCREEN RCVD TLK: CPT

## 2024-07-01 ASSESSMENT — ENCOUNTER SYMPTOMS
SHORTNESS OF BREATH: 1
RHINORRHEA: 0
SINUS PRESSURE: 0
COUGH: 1
WHEEZING: 1
CHEST TIGHTNESS: 1
SINUS PAIN: 0

## 2024-07-01 NOTE — TELEPHONE ENCOUNTER
Download after pressure change attached to this encounter. Please review and notify me if any further changes are needed so that I may inform the patient. Thank you!  -  Dates: 06/14/24-06/27/24  DME: [facility]  AHI: 2.2

## 2024-07-02 ENCOUNTER — HOSPITAL ENCOUNTER (OUTPATIENT)
Dept: RESPIRATORY THERAPY | Age: 64
Discharge: HOME | End: 2024-07-02
Payer: MEDICARE

## 2024-07-02 DIAGNOSIS — J96.11 CHRONIC RESPIRATORY FAILURE WITH HYPOXIA (HCC): ICD-10-CM

## 2024-07-02 DIAGNOSIS — G47.33 OSA (OBSTRUCTIVE SLEEP APNEA): ICD-10-CM

## 2024-07-02 PROCEDURE — 94762 N-INVAS EAR/PLS OXIMTRY CONT: CPT

## 2024-07-02 NOTE — PROCEDURES
PROCEDURE NOTE  Date: 7/2/2024   Name: Farhad Myles  YOB: 1960    Pulse oximetry, overnight    Date/Time: 7/2/2024 1:29 PM    Performed by: Indigo Adkins RCP  Authorized by: Tiff Pierre APRN - CNP              Instructions were given for an overnight nocturnal pulse oximetry study. The assigned GBA number of the pulse oximetry was [id number].  A log sheet was completed. Patient was instructed on documenting any events that occurred throughout the night on the log sheet. The procedure was explained to the learner(s). Patient understanding of the procedure was excellent.     Patient does have a mean of transportation to bring back the study the next day. A patient task was placed in the patient’s chart for the  to download the nocturnal study and fax the results to the ordering provider for interpretation. The pulse oximetry’s memory was cleared. Patient had no questions and was sent home with the pulse oximeter.

## 2024-07-09 ENCOUNTER — TELEPHONE (OUTPATIENT)
Dept: PULMONOLOGY | Age: 64
End: 2024-07-09

## 2024-07-09 DIAGNOSIS — G47.34 NOCTURNAL HYPOXIA: ICD-10-CM

## 2024-07-09 DIAGNOSIS — I50.32 CHRONIC DIASTOLIC HEART FAILURE (HCC): ICD-10-CM

## 2024-07-09 DIAGNOSIS — G47.33 OSA (OBSTRUCTIVE SLEEP APNEA): Primary | ICD-10-CM

## 2024-07-09 DIAGNOSIS — J98.4 RESTRICTIVE LUNG DISEASE: ICD-10-CM

## 2024-07-09 DIAGNOSIS — J96.11 CHRONIC RESPIRATORY FAILURE WITH HYPOXIA (HCC): ICD-10-CM

## 2024-07-09 NOTE — TELEPHONE ENCOUNTER
Called and spoke with patient. He stated he can't afford another sleep study. That he has been using his BiPap more with O2 that bleeds into it at night and that he has been waking up feeling pretty good in the morning. Please advise.

## 2024-07-09 NOTE — TELEPHONE ENCOUNTER
Spoke with patient again. He had mentioned that he don't know if he could afford the overnight pulse ox as well. He has [employer] insurance and stated they only cover half and he pays the rest. However I did convinced the patient to at least try to schedule the testing around the time he knows he will have the money to get the testing done. Pt stated ok and he said if he has the money he will get it done if not he will inform us about it. Transferred pt to  to get overnight pulse ox scheduled.

## 2024-07-09 NOTE — TELEPHONE ENCOUNTER
Please notify patient that he spent 7 hours and 21 min with low O2 on BiPAP on room air. Recommend retitration order. Please assist patient in scheduling. Thanks!

## 2024-07-10 DIAGNOSIS — G25.81 RLS (RESTLESS LEGS SYNDROME): ICD-10-CM

## 2024-07-10 DIAGNOSIS — E03.9 HYPOTHYROIDISM, UNSPECIFIED TYPE: ICD-10-CM

## 2024-07-13 RX ORDER — LEVOTHYROXINE SODIUM 175 UG/1
175 TABLET ORAL DAILY
Qty: 90 TABLET | Refills: 3 | Status: SHIPPED | OUTPATIENT
Start: 2024-07-13

## 2024-07-13 RX ORDER — ROPINIROLE 0.5 MG/1
TABLET, FILM COATED ORAL
Qty: 270 TABLET | Refills: 3 | Status: SHIPPED | OUTPATIENT
Start: 2024-07-13

## 2024-07-15 ENCOUNTER — APPOINTMENT (OUTPATIENT)
Dept: GENERAL RADIOLOGY | Age: 64
End: 2024-07-15
Payer: MEDICARE

## 2024-07-15 ENCOUNTER — HOSPITAL ENCOUNTER (EMERGENCY)
Age: 64
Discharge: HOME | End: 2024-07-15
Payer: MEDICARE

## 2024-07-15 ENCOUNTER — HOSPITAL ENCOUNTER (OUTPATIENT)
Dept: RESPIRATORY THERAPY | Age: 64
Discharge: HOME | End: 2024-07-15
Payer: MEDICARE

## 2024-07-15 VITALS
SYSTOLIC BLOOD PRESSURE: 175 MMHG | HEART RATE: 73 BPM | DIASTOLIC BLOOD PRESSURE: 85 MMHG | OXYGEN SATURATION: 96 % | RESPIRATION RATE: 22 BRPM | TEMPERATURE: 98.6 F

## 2024-07-15 DIAGNOSIS — S63.501A SPRAIN OF RIGHT WRIST, INITIAL ENCOUNTER: Primary | ICD-10-CM

## 2024-07-15 DIAGNOSIS — G47.33 OSA (OBSTRUCTIVE SLEEP APNEA): ICD-10-CM

## 2024-07-15 DIAGNOSIS — G47.34 NOCTURNAL HYPOXIA: ICD-10-CM

## 2024-07-15 PROCEDURE — 94762 N-INVAS EAR/PLS OXIMTRY CONT: CPT

## 2024-07-15 PROCEDURE — 99213 OFFICE O/P EST LOW 20 MIN: CPT | Performed by: NURSE PRACTITIONER

## 2024-07-15 PROCEDURE — 73130 X-RAY EXAM OF HAND: CPT

## 2024-07-15 PROCEDURE — 99213 OFFICE O/P EST LOW 20 MIN: CPT

## 2024-07-15 PROCEDURE — 73110 X-RAY EXAM OF WRIST: CPT

## 2024-07-15 ASSESSMENT — ENCOUNTER SYMPTOMS
VOMITING: 0
DIARRHEA: 0
COUGH: 0
SORE THROAT: 0
RHINORRHEA: 0
SHORTNESS OF BREATH: 0
CHEST TIGHTNESS: 0
NAUSEA: 0

## 2024-07-15 ASSESSMENT — PAIN DESCRIPTION - PAIN TYPE: TYPE: ACUTE PAIN

## 2024-07-15 ASSESSMENT — PAIN SCALES - GENERAL: PAINLEVEL_OUTOF10: 10

## 2024-07-15 ASSESSMENT — PAIN DESCRIPTION - ONSET: ONSET: PROGRESSIVE

## 2024-07-15 ASSESSMENT — PAIN DESCRIPTION - LOCATION: LOCATION: WRIST

## 2024-07-15 ASSESSMENT — PAIN - FUNCTIONAL ASSESSMENT
PAIN_FUNCTIONAL_ASSESSMENT: 0-10
PAIN_FUNCTIONAL_ASSESSMENT: ACTIVITIES ARE NOT PREVENTED

## 2024-07-15 ASSESSMENT — PAIN DESCRIPTION - DESCRIPTORS: DESCRIPTORS: ACHING;SHARP

## 2024-07-15 ASSESSMENT — PAIN DESCRIPTION - FREQUENCY: FREQUENCY: CONTINUOUS

## 2024-07-15 ASSESSMENT — PAIN DESCRIPTION - ORIENTATION: ORIENTATION: RIGHT

## 2024-07-15 NOTE — ED PROVIDER NOTES
[facility] Encounter       CHIEF COMPLAINT       Chief Complaint   Patient presents with    Wrist Pain     Right wrist pain after falling last Tuesday        Nurses Notes reviewed and I agree except as noted in the HPI.  HISTORY OF PRESENT ILLNESS   Farhad Myles is a 63 y.o. male who presents to the [facility] for evaluation of right wrist pain.  He reports roughly 6 days ago he was falling and put his right wrist behind him to catch himself.  He reports that he did end up catching himself and fell into a chair.  He reports right wrist and hand pain.  He reports increased pain with range of motion.  He has noted to have mild edema.    The history is provided by the patient. No  was used.       REVIEW OF SYSTEMS     Review of Systems   Constitutional:  Negative for activity change, appetite change, chills, fatigue and fever.   HENT:  Negative for ear discharge, ear pain, rhinorrhea and sore throat.    Respiratory:  Negative for cough, chest tightness and shortness of breath.    Cardiovascular:  Negative for chest pain.   Gastrointestinal:  Negative for diarrhea, nausea and vomiting.   Genitourinary:  Negative for dysuria.   Musculoskeletal:  Positive for arthralgias.   Skin:  Negative for rash.   Allergic/Immunologic: Negative for environmental allergies and food allergies.   Neurological:  Negative for dizziness and headaches.       PAST MEDICAL HISTORY         Diagnosis Date    Arthritis     Back problem     back pain-sees [facility] pain mgmt    Bladder disease     Dr. Allison    CHF with unknown LVEF ([facility]) 05/24/2021    Dr. George/CHF clinic    Chronic kidney disease     Constipation     Coronary artery disease involving native coronary artery of native heart with angina pectoris ([facility]) 5/21/2024    History of diabetes mellitus     resolved with weight loss    Hypertension     Hypertensive emergency 04/11/2019    Hypertensive urgency 04/13/2019  None    URGENT CARE COURSE:     Vitals:    07/15/24 1645   BP: (!) 175/85   Pulse: 73   Resp: 22   Temp: 98.6 °F (37 °C)   TempSrc: Temporal   SpO2: 96%       Medications - No data to display         PROCEDURES:  None    FINAL IMPRESSION      1. Sprain of right wrist, initial encounter          DISPOSITION/ PLAN     Patient seen and evaluated for fall with wrist pain.  X-rays of the patient's right wrist and hand were completed with no acute findings.  There did show some chronic changes, see above dictation.  Patient is instructed to continue to take his Percocet.  Patient is on dialysis, unable to use NSAIDs.  I did provide the patient with a wrist brace for comfort.  Can use over-the-counter Tylenol for breakthrough pain.  Instructed have close follow-up with his PCP or the [facility] in 3 to 5 days and worsening symptoms.  Patient is agreeable to the above plan and denies questions or concerns at this time.      PATIENT REFERRED TO:  Dulce Maria Crespo MD  [address] [place] [address], [address] / [place] [address]      DISCHARGE MEDICATIONS:  Discharge Medication List as of 7/15/2024  5:49 PM          Discharge Medication List as of 7/15/2024  5:49 PM        STOP taking these medications       potassium chloride (K-TAB) 10 MEQ extended release tablet Comments:   Reason for Stopping:         bumetanide (BUMEX) 1 MG tablet Comments:   Reason for Stopping:         amLODIPine (NORVASC) 10 MG tablet Comments:   Reason for Stopping:         glimepiride (AMARYL) 4 MG tablet Comments:   Reason for Stopping:               Discharge Medication List as of 7/15/2024  5:49 PM          SAHIL Lambert CNP    (Please note that portions of this note were completed with a voice recognition program. Efforts were made to edit the dictations but occasionally words are mis-transcribed.)           James Mora APRN - CNP  07/15/24 [id number]       James Mora APRN - CNP  07/15/24 1953

## 2024-07-15 NOTE — ED NOTES
Discharge instructions and prescriptions reviewed with pt. Pt verbalized understanding. Pt ambulated out in stable condition.  Assessment unchanged upon discharge.     Dulce Maria Luke RN  07/15/24 [id number]

## 2024-07-15 NOTE — PROGRESS NOTES
Instructions were given for an overnight nocturnal pulse oximetry study. The assigned GBA number of the pulse oximetry was [id number].  A log sheet was completed. Patient was instructed on documenting any events that occurred throughout the night on the log sheet. The procedure was explained to the learner(s). Patient understanding of the procedure was good.     Patient does have a mean of transportation to bring back the study the next day. A patient task was placed in the patient’s chart for the  to download the nocturnal study and fax the results to the ordering provider for interpretation. The pulse oximetry’s memory was cleared. Patient had no questions and was sent home with the pulse oximeter.

## 2024-07-16 ENCOUNTER — TELEPHONE (OUTPATIENT)
Dept: INTERNAL MEDICINE CLINIC | Age: 64
End: 2024-07-16

## 2024-08-01 DIAGNOSIS — M47.816 LUMBAR FACET ARTHROPATHY: ICD-10-CM

## 2024-08-01 DIAGNOSIS — G89.29 CHRONIC PAIN OF BOTH KNEES: ICD-10-CM

## 2024-08-01 DIAGNOSIS — G89.4 CHRONIC PAIN SYNDROME: ICD-10-CM

## 2024-08-01 DIAGNOSIS — M47.816 SPONDYLOSIS OF LUMBAR REGION WITHOUT MYELOPATHY OR RADICULOPATHY: ICD-10-CM

## 2024-08-01 DIAGNOSIS — M25.561 CHRONIC PAIN OF BOTH KNEES: ICD-10-CM

## 2024-08-01 DIAGNOSIS — M25.562 CHRONIC PAIN OF BOTH KNEES: ICD-10-CM

## 2024-08-01 DIAGNOSIS — M48.062 SPINAL STENOSIS OF LUMBAR REGION WITH NEUROGENIC CLAUDICATION: ICD-10-CM

## 2024-08-01 DIAGNOSIS — M17.0 PRIMARY OSTEOARTHRITIS OF BOTH KNEES: ICD-10-CM

## 2024-08-01 DIAGNOSIS — G62.9 NEUROPATHY: ICD-10-CM

## 2024-08-01 RX ORDER — OXYCODONE AND ACETAMINOPHEN 10; 325 MG/1; MG/1
1 TABLET ORAL EVERY 8 HOURS PRN
Qty: 90 TABLET | Refills: 0 | Status: SHIPPED | OUTPATIENT
Start: 2024-08-01 | End: 2024-08-31

## 2024-08-01 RX ORDER — GABAPENTIN 300 MG/1
600 CAPSULE ORAL NIGHTLY
Qty: 60 CAPSULE | Refills: 0 | OUTPATIENT
Start: 2024-08-01 | End: 2024-09-30

## 2024-08-01 NOTE — TELEPHONE ENCOUNTER
Refused has he should have plenty of Neurontin after filling 90 from Dr. Gale the day after me. Please discuss with him as he should still have over a month left. It appears he may need his Percocet filled though

## 2024-08-01 NOTE — TELEPHONE ENCOUNTER
OARRS reviewed. UDS: + for  . Phenobarbital, oxycodone,sertraline, gbapentin  Last seen: 6/17/2024. Follow-up:   Future Appointments   Date Time Provider Department Center   8/5/2024  2:40 PM Saúl Alvarez MD SRPX Physic [facility] E[facility] DEP   8/7/2024  3:00 PM Tiff Pierre, APRN - CNP N Pulm Med P - [place]   8/19/2024 12:15 PM Joel Holcomb APRN - CNP N SRPX Pain [facility] - [place]   10/10/2024  2:00 PM Cally Yang APRN - CNP N SRPX Del U P - [place]   10/21/2024  1:20 PM Saúl Alvarez MD SRPX Physic [facility] DEP   11/14/2024  3:20 PM Tiff Pierre, APRN - CNP N Pulm Med P - [place]   3/27/2025  2:00 PM Marco George MD N SRPX Heart P - [place]    Pt said he did fill the gabapentin but has not used cause he realized after he got home that he was not suppose to use since getting his script from you. Told him to go ahead and use and next month and can get a refill from you

## 2024-08-01 NOTE — TELEPHONE ENCOUNTER
OARRS reviewed. UDS: + for  gabapentin, sertraline, oxycodone. Phenobarbital and cotinine is present.  Last seen: 6/17/2024. Follow-up: 8/19/2024      Pt also got 30 day fill of gabapentin 300 mg  by thomas Gale DO on 6/26/24. After filled scriopt from you on 6/24/24.

## 2024-08-08 ENCOUNTER — HOSPITAL ENCOUNTER (OUTPATIENT)
Dept: CT IMAGING | Age: 64
Discharge: HOME | End: 2024-08-08
Payer: MEDICARE

## 2024-08-08 DIAGNOSIS — E66.01 MORBID OBESITY WITH BMI OF 40.0-44.9, ADULT (HCC): ICD-10-CM

## 2024-08-08 DIAGNOSIS — J96.11 CHRONIC RESPIRATORY FAILURE WITH HYPOXIA (HCC): ICD-10-CM

## 2024-08-08 DIAGNOSIS — Z77.090 EXPOSURE TO ASBESTOS: ICD-10-CM

## 2024-08-08 DIAGNOSIS — Z77.22 PASSIVE SMOKE EXPOSURE: ICD-10-CM

## 2024-08-08 DIAGNOSIS — J98.4 RESTRICTIVE LUNG DISEASE: ICD-10-CM

## 2024-08-08 DIAGNOSIS — R94.2 DECREASED DIFFUSION CAPACITY OF LUNG: ICD-10-CM

## 2024-08-08 DIAGNOSIS — I50.32 CHRONIC DIASTOLIC HEART FAILURE (HCC): ICD-10-CM

## 2024-08-08 PROCEDURE — 71250 CT THORAX DX C-: CPT

## 2024-08-11 DIAGNOSIS — G89.4 CHRONIC PAIN SYNDROME: ICD-10-CM

## 2024-08-11 DIAGNOSIS — G62.9 NEUROPATHY: ICD-10-CM

## 2024-08-12 RX ORDER — GABAPENTIN 300 MG/1
600 CAPSULE ORAL NIGHTLY
Qty: 60 CAPSULE | Refills: 0 | Status: SHIPPED | OUTPATIENT
Start: 2024-08-12 | End: 2024-09-11

## 2024-08-12 NOTE — TELEPHONE ENCOUNTER
OARRS reviewed. UDS: + for  Gabapentin, Sertraline, Oxycodone, Phenobarbital.   Last seen: 6/17/2024. Follow-up: 8/19/2024

## 2024-08-19 ENCOUNTER — OFFICE VISIT (OUTPATIENT)
Dept: PHYSICAL MEDICINE AND REHAB | Age: 64
End: 2024-08-19
Payer: MEDICARE

## 2024-08-19 VITALS
SYSTOLIC BLOOD PRESSURE: 128 MMHG | HEIGHT: 73 IN | WEIGHT: 309.31 LBS | DIASTOLIC BLOOD PRESSURE: 82 MMHG | BODY MASS INDEX: 40.99 KG/M2

## 2024-08-19 DIAGNOSIS — M17.0 PRIMARY OSTEOARTHRITIS OF BOTH KNEES: ICD-10-CM

## 2024-08-19 DIAGNOSIS — M47.816 LUMBAR FACET ARTHROPATHY: ICD-10-CM

## 2024-08-19 DIAGNOSIS — M48.062 SPINAL STENOSIS OF LUMBAR REGION WITH NEUROGENIC CLAUDICATION: ICD-10-CM

## 2024-08-19 DIAGNOSIS — G89.4 CHRONIC PAIN SYNDROME: ICD-10-CM

## 2024-08-19 DIAGNOSIS — G62.9 NEUROPATHY: ICD-10-CM

## 2024-08-19 DIAGNOSIS — I50.32 CHF (CONGESTIVE HEART FAILURE), NYHA CLASS II, CHRONIC, DIASTOLIC (HCC): ICD-10-CM

## 2024-08-19 DIAGNOSIS — M25.562 CHRONIC PAIN OF BOTH KNEES: ICD-10-CM

## 2024-08-19 DIAGNOSIS — M47.816 SPONDYLOSIS OF LUMBAR REGION WITHOUT MYELOPATHY OR RADICULOPATHY: Primary | ICD-10-CM

## 2024-08-19 DIAGNOSIS — G89.29 CHRONIC BILATERAL LOW BACK PAIN WITHOUT SCIATICA: ICD-10-CM

## 2024-08-19 DIAGNOSIS — G89.29 CHRONIC PAIN OF BOTH KNEES: ICD-10-CM

## 2024-08-19 DIAGNOSIS — M25.561 CHRONIC PAIN OF BOTH KNEES: ICD-10-CM

## 2024-08-19 DIAGNOSIS — M54.50 CHRONIC BILATERAL LOW BACK PAIN WITHOUT SCIATICA: ICD-10-CM

## 2024-08-19 PROCEDURE — 3079F DIAST BP 80-89 MM HG: CPT | Performed by: NURSE PRACTITIONER

## 2024-08-19 PROCEDURE — 99214 OFFICE O/P EST MOD 30 MIN: CPT | Performed by: NURSE PRACTITIONER

## 2024-08-19 PROCEDURE — 4004F PT TOBACCO SCREEN RCVD TLK: CPT | Performed by: NURSE PRACTITIONER

## 2024-08-19 PROCEDURE — 3017F COLORECTAL CA SCREEN DOC REV: CPT | Performed by: NURSE PRACTITIONER

## 2024-08-19 PROCEDURE — G8427 DOCREV CUR MEDS BY ELIG CLIN: HCPCS | Performed by: NURSE PRACTITIONER

## 2024-08-19 PROCEDURE — 3074F SYST BP LT 130 MM HG: CPT | Performed by: NURSE PRACTITIONER

## 2024-08-19 PROCEDURE — G8417 CALC BMI ABV UP PARAM F/U: HCPCS | Performed by: NURSE PRACTITIONER

## 2024-08-19 ASSESSMENT — ENCOUNTER SYMPTOMS
WHEEZING: 0
BACK PAIN: 1
DIARRHEA: 0
CHEST TIGHTNESS: 0
STRIDOR: 0
COUGH: 1
SHORTNESS OF BREATH: 1
CHOKING: 0
APNEA: 1
ABDOMINAL PAIN: 0
CONSTIPATION: 0
GASTROINTESTINAL NEGATIVE: 1

## 2024-08-19 NOTE — PROGRESS NOTES
abnormal.      Gait: Gait abnormal.      Deep Tendon Reflexes:      Reflex Scores:       Tricep reflexes are 2+ on the right side and 2+ on the left side.       Bicep reflexes are 2+ on the right side and 2+ on the left side.       Brachioradialis reflexes are 2+ on the right side and 2+ on the left side.       Patellar reflexes are 1+ on the right side and 1+ on the left side.       Achilles reflexes are 1+ on the right side and 1+ on the left side.     Comments: 4/5 strength bilateral lower extremity, 5/5 bilateral upper extremities     Decreased sensation bilateral feet    Psychiatric:         Mood and Affect: Mood normal.         Behavior: Behavior normal.       SAUNDRA  Patricks test  positive  Yeoman's  or Gaenslen's positive       Assessment:     1. 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 Lumbar facet arthropathy    3. Spinal stenosis of lumbar region with neurogenic claudication    4. Chronic bilateral low back pain without sciatica    5. Primary osteoarthritis of both knees    6. Chronic pain of both knees    7. Neuropathy    8. Chronic pain syndrome    9. CHF (congestive heart failure), NYHA class II, chronic, diastolic (HCC)         Assessment & Plan   Plan:      OARRS reviewed. Current MED: 45.00  Patient was offered naloxone for home.   Discussed long term side effects of medications, tolerance, dependency and addiction.  Previous UDS reviewed  UDS preformed today for compliance.  Patient told can not receive any pain medications from any other source.  No evidence of abuse, diversion or aberrant behavior.  Medications and/or procedures to improve function and quality of life- patient understanding with this and that may not be pain free  Discussed with patient about safe storage of medications at home  Discussed possible weaning of medication dosing dependent on treatment/procedure results.   Discussed with patient about risks with procedure including infection, reaction to

## 2024-08-20 NOTE — PROGRESS NOTES
[facility]    Patient: RICH DALLAS, 63 y.o.   : 1960  2024    Patient of mine     Assessment/Plan   1. Abnormal CT of the chest with ground glass opacity present on imaging of lung  -Reviewed HRCT with patient no significant bronchiectasis or honeycombing. He does have stable groundglass opacities comparing back to CT Chest in . Cannot rule out airway inflammation/infection/interstitial lung disease   -Obtain connective tissue disease work up wt OSEAS Screen with Reflex, Anti-Neutrophilic Cytoplasmic Antibody, & Rheumatoid Factor. Will plan to call patient with results.  -Pending repeat pulmonary function test will plan repeat HRCT in 1 year to follow    2. Restrictive lung disease with Decreased diffusion capacity of lung  -Patient's pulmonary function test in  showed decline in DLCO compared to pulmonary function test in . He is also showing restriction. Advised repeat pulmonary function test in 3-6 months to follow. Patient wishes to follow with pulmonary function test in 6 months   -Reviewed preventative vaccinations    3. Chronic respiratory failure with hypoxia (HCC)  -Continue 2 -3 lpm at rest and 3 lpm with exertion    4. Bilateral pleural effusion  -No significant effusion on chest x-ray in May 2024. Patient is currently showing bilateral pleural effusions likely from missed dialysis treatments. Follow with chest x-ray prior to next visit. Advised patient we will obtain earlier if he has worsening breathing symptoms     Advised patient to call office with any changes, questions, or concerns regarding respiratory status or issues with prescribed medications.    Advised patient on importance of resuming dialysis and close follow up with cardio and nephro.     Return in about 4 months (around 2024) for RLD with CXR and PFT prior (DAYANARA Iqbal for this visit).        Subjective     Chief Complaint   Patient presents with    Follow-up

## 2024-08-21 ENCOUNTER — OFFICE VISIT (OUTPATIENT)
Dept: PULMONOLOGY | Age: 64
End: 2024-08-21
Payer: MEDICARE

## 2024-08-21 VITALS
HEIGHT: 73 IN | SYSTOLIC BLOOD PRESSURE: 142 MMHG | HEART RATE: 72 BPM | WEIGHT: 314 LBS | TEMPERATURE: 98 F | BODY MASS INDEX: 41.62 KG/M2 | OXYGEN SATURATION: 98 % | DIASTOLIC BLOOD PRESSURE: 80 MMHG

## 2024-08-21 DIAGNOSIS — J96.11 CHRONIC RESPIRATORY FAILURE WITH HYPOXIA (HCC): ICD-10-CM

## 2024-08-21 DIAGNOSIS — J90 BILATERAL PLEURAL EFFUSION: ICD-10-CM

## 2024-08-21 DIAGNOSIS — J98.4 RESTRICTIVE LUNG DISEASE: ICD-10-CM

## 2024-08-21 DIAGNOSIS — R91.8 GROUND GLASS OPACITY PRESENT ON IMAGING OF LUNG: ICD-10-CM

## 2024-08-21 DIAGNOSIS — R94.2 DECREASED DIFFUSION CAPACITY OF LUNG: ICD-10-CM

## 2024-08-21 DIAGNOSIS — R93.89 ABNORMAL CT OF THE CHEST: Primary | ICD-10-CM

## 2024-08-21 PROCEDURE — 4004F PT TOBACCO SCREEN RCVD TLK: CPT

## 2024-08-21 PROCEDURE — 3079F DIAST BP 80-89 MM HG: CPT

## 2024-08-21 PROCEDURE — 3017F COLORECTAL CA SCREEN DOC REV: CPT

## 2024-08-21 PROCEDURE — G8427 DOCREV CUR MEDS BY ELIG CLIN: HCPCS

## 2024-08-21 PROCEDURE — 99214 OFFICE O/P EST MOD 30 MIN: CPT

## 2024-08-21 PROCEDURE — G8417 CALC BMI ABV UP PARAM F/U: HCPCS

## 2024-08-21 PROCEDURE — 3077F SYST BP >= 140 MM HG: CPT

## 2024-08-21 ASSESSMENT — ENCOUNTER SYMPTOMS
SINUS PAIN: 0
CHEST TIGHTNESS: 0
SHORTNESS OF BREATH: 1
COUGH: 1
SINUS PRESSURE: 0
WHEEZING: 1
RHINORRHEA: 1

## 2024-08-23 SDOH — ECONOMIC STABILITY: INCOME INSECURITY: HOW HARD IS IT FOR YOU TO PAY FOR THE VERY BASICS LIKE FOOD, HOUSING, MEDICAL CARE, AND HEATING?: HARD

## 2024-08-23 SDOH — ECONOMIC STABILITY: TRANSPORTATION INSECURITY
IN THE PAST 12 MONTHS, HAS LACK OF TRANSPORTATION KEPT YOU FROM MEETINGS, WORK, OR FROM GETTING THINGS NEEDED FOR DAILY LIVING?: NO

## 2024-08-23 SDOH — ECONOMIC STABILITY: FOOD INSECURITY: WITHIN THE PAST 12 MONTHS, THE FOOD YOU BOUGHT JUST DIDN'T LAST AND YOU DIDN'T HAVE MONEY TO GET MORE.: SOMETIMES TRUE

## 2024-08-23 SDOH — ECONOMIC STABILITY: FOOD INSECURITY: WITHIN THE PAST 12 MONTHS, YOU WORRIED THAT YOUR FOOD WOULD RUN OUT BEFORE YOU GOT MONEY TO BUY MORE.: SOMETIMES TRUE

## 2024-08-26 ENCOUNTER — OFFICE VISIT (OUTPATIENT)
Dept: INTERNAL MEDICINE CLINIC | Age: 64
End: 2024-08-26

## 2024-08-26 VITALS
DIASTOLIC BLOOD PRESSURE: 86 MMHG | SYSTOLIC BLOOD PRESSURE: 138 MMHG | WEIGHT: 315 LBS | HEART RATE: 65 BPM | BODY MASS INDEX: 41.75 KG/M2 | OXYGEN SATURATION: 96 % | HEIGHT: 73 IN

## 2024-08-26 DIAGNOSIS — I50.43 CHF (CONGESTIVE HEART FAILURE), NYHA CLASS III, ACUTE ON CHRONIC, COMBINED (HCC): ICD-10-CM

## 2024-08-26 DIAGNOSIS — M17.0 OSTEOARTHRITIS OF BOTH KNEES, UNSPECIFIED OSTEOARTHRITIS TYPE: ICD-10-CM

## 2024-08-26 DIAGNOSIS — K59.04 CHRONIC IDIOPATHIC CONSTIPATION: ICD-10-CM

## 2024-08-26 DIAGNOSIS — K21.9 GASTROESOPHAGEAL REFLUX DISEASE WITHOUT ESOPHAGITIS: ICD-10-CM

## 2024-08-26 DIAGNOSIS — G25.81 RLS (RESTLESS LEGS SYNDROME): ICD-10-CM

## 2024-08-26 DIAGNOSIS — G25.2 ACTION TREMOR: Primary | ICD-10-CM

## 2024-08-26 DIAGNOSIS — F32.9 MAJOR DEPRESSIVE DISORDER WITH CURRENT ACTIVE EPISODE, UNSPECIFIED DEPRESSION EPISODE SEVERITY, UNSPECIFIED WHETHER RECURRENT: ICD-10-CM

## 2024-08-26 RX ORDER — VENLAFAXINE HYDROCHLORIDE 75 MG/1
75 CAPSULE, EXTENDED RELEASE ORAL DAILY
Qty: 7 CAPSULE | Refills: 0 | Status: ON HOLD | OUTPATIENT
Start: 2024-08-26 | End: 2024-09-02

## 2024-08-26 RX ORDER — SERTRALINE HYDROCHLORIDE 100 MG/1
100 TABLET, FILM COATED ORAL DAILY
Qty: 8 TABLET | Refills: 0 | Status: ON HOLD | OUTPATIENT
Start: 2024-08-26 | End: 2024-09-03

## 2024-08-26 RX ORDER — VENLAFAXINE HYDROCHLORIDE 150 MG/1
150 CAPSULE, EXTENDED RELEASE ORAL DAILY
Qty: 90 CAPSULE | Refills: 1 | Status: ON HOLD | OUTPATIENT
Start: 2024-09-03

## 2024-08-26 RX ORDER — POLYETHYLENE GLYCOL 3350 17 G/17G
17 POWDER, FOR SOLUTION ORAL DAILY
Qty: 1530 G | Refills: 1 | Status: ON HOLD | OUTPATIENT
Start: 2024-08-26 | End: 2025-02-22

## 2024-08-26 RX ORDER — PRIMIDONE 50 MG/1
TABLET ORAL
Qty: 180 TABLET | Refills: 1 | Status: ON HOLD | OUTPATIENT
Start: 2024-08-26

## 2024-08-26 RX ORDER — PANTOPRAZOLE SODIUM 20 MG/1
20 TABLET, DELAYED RELEASE ORAL
Qty: 30 TABLET | Refills: 5 | Status: ON HOLD | OUTPATIENT
Start: 2024-08-26

## 2024-08-26 RX ORDER — MONTELUKAST SODIUM 10 MG/1
10 TABLET ORAL NIGHTLY
Qty: 90 TABLET | Refills: 1 | Status: ON HOLD | OUTPATIENT
Start: 2024-08-26

## 2024-08-26 NOTE — PATIENT INSTRUCTIONS
[place] [employer]*  (Call United Way/211 if need more resources.)        Home Delivered Meals:  Home[employer] Bound Delivered Meals:  Phone: 1-469.828.5484  [facility] ([place] only):  Phone: [phone]  Moms Meals:  What they offer: Nutritious refrigerated meals. Available to individuals with Medicare advantage plans, Medicaid plans, Long-Term services and supports (LTSS) programs and self-pay at $6.99 a meal.  Renal-Friendly, pureed and Gluten free at $7.99 a meal plus flat rate shipping.   Phone: 1-955.920.8452  Website: https://www.TheLocker    Senior Nutrition program:  Phone: 1-794.386.8727  [employer]Z Home Delivered Meals (From [place]):  What they offer: Meals for seniors, disabled, recovering from an injury or illness on [place] and [facility]. Both privately and through homecare services like [facility] and [facility].   Phone: 1-111.805.2994  Website: https://RescueTime.DeliveryChef.in    Other Food Resources:  MOW (Meals on Wheels) Contact Area Agency on Aging   Phone: 188.102.6592    [facility]- Nutrition Program   What they offer: Nutrition education and nutritious foods for Pregnant women, women who just had a baby, breastfeeding moms, infants and children up to the age of 5.   Phone: 438.139.1092   Website: https://www.VidSchool.org  [facility]- Provides Meals  What they offer: Provide food boxes to those in need monthly.  Phone: 189.372.2368  Website: https://[facility].org/ministries/[facility]  [employer]-Food/ Clothing   What they offer:  Provides a 3-day supply of food for individuals once a month.   Phone: 242.848.6949  Website: https://[employer].org/author/pradipacup1  [employer]-Meal Site   Phone: [phone]  [employer]- Meal Site  Phone: 1-687.739.9825  [employer]:  What they offer: Provides continental breakfast and hot lunch   Phone: 233-474-7236  Website: https://odbread.org  Services:  What they offer: Medical coverage assistance for children, pregnant women, elderly, individuals with disabilities and those looking for long term care and/or in home waiver care.   Phone Number:  875.886.8363  Website: https://[place]. [employer]/services/medical-assistance    [facility] Agency on Aging:  What they offer: Aging and disability resource center, care management/coordination, transportation, wellness and prevention.  Phone Number: 160.176.1144        Utility  [facility] Community Action Partnership:  What they do: Help pay rent, utilities & internet bills.  Phone Number: 206.186.4251  Website: https://[employer]s/emergency-services/rent-assistance      [facility] [employer] [facility] access [facility]:  What they do: They offer Utility assistance  Phone Number: 190.858.2352   Website: https://[employer].[facility].org/[facility]/lima/equip-families    [place] [employer]:  What they offer: Temporary cash assistance to families to pay immediate needs while the adults of the families prepare and search for jobs.   Phone Number: 772.482.1664  Website: https://[place]/services/cash-assistance                                       Take levothyroxine by itself, at least 30 minutes before or after eating anything or taking other medications.    Start taking primidone 2 tablets in the morning and 1 tablet in the evening, for a total of 3 primidone tablets per day. Tablets are 50mg each, therefore morning dose is 100mg (2 tablets) and evening dose is 50mg (1 tablet), total daily dose 150mg (3 tablets).     Reduce Zoloft to one 100mg tablet once daily. Start taking Effexor 75mg daily for 1 week.   Thereafter, stop Zoloft and start taking Effexor 150mg daily.     Resume Metamucil gummies (taken with glass of water). Start taking Miralax as needed - start with one per day, increase as needed. Aiming for goal of bowel movement at least every 2 or 3 days.

## 2024-08-26 NOTE — PROGRESS NOTES
1960    Chief Complaint   Patient presents with    Anxiety    Coronary Artery Disease    Congestive Heart Failure    osteoarthritis     Bilateral knee       Pt is a 63 y.o. male who presents for an initial visit.  She is a previous patient of Dr Johnston.  He also sees Dr George and Dr Em.    HPI    Patient c/o reduced mobility, which is having a significant toll of his perceived QOL. 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     Patient is undergoing dialysis treatments at home. He is wondering if PT can see him at home. He is home-bound, he does not leave the house unless there are healthcare appointments that he has to attend.     Patient reports that GERD symptoms are better with Protonix, but he has flares of abdominal pain with excessive bowel cramps.    Sometimes he gets constipation and diarrhea at different times, he cannot identify any specific food or behavioral issue that elicits it. Last BM Saturday, was uncontrollable diarrhea.     Patient interested in getting wheelchair / power chair. As documented in physical exam section, hip flexion power 3/5 on right, 4/5 on left. Knee flexion and extension 4/5 bilaterally. Foot plantar flexion and dorsiflexion 5/5 bilaterally.     Restless legs syndrome symptoms well controlled, no issues with falling asleep or waking up due to symptoms. He describes sleep as erratic. He sleeps in recliner downstairs b/c he cannot make it upstairs (for 3yrs). Estimates 12-18hrs sleep total per day (wife estimates 1 week per day is a \"normal\" awake during the day, ie sleeping 12hrs).     Action tremor no longer well controlled on primidone, symptoms vary widely based on the day. Can range from not bothersome or noticeable to very disruptive.     Patient has pulse ox monitor that he uses when he feels like he has palpitations, he says his O2% has been around 98% but his HR  notable with action or holding arms outstretched.   Extremities - peripheral pulses normal, no pedal edema, no clubbing or cyanosis  Skin - warm and dry    Diagnostic data:   I have reviewed recent diagnostic testing including: TSH, free T4      Assessment and Plan:     Diagnosis Orders   1. Action tremor  primidone (MYSOLINE) 50 MG tablet      2. Major depressive disorder with current active episode, unspecified depression episode severity, unspecified whether recurrent  sertraline (ZOLOFT) 100 MG tablet      3. RLS (restless legs syndrome)  primidone (MYSOLINE) 50 MG tablet      4. Gastroesophageal reflux disease without esophagitis  pantoprazole (PROTONIX) 20 MG tablet      5. CHF (congestive heart failure), NYHA class III, acute on chronic, combined ([facility])        6. Osteoarthritis of both knees, unspecified osteoarthritis type  [facility]            Constipation: patient is reporting long-time issues with constipation. He regularly has no bowel movements for 3-5 days after which he will have uncontrolled diarrhea. He has used Metamucil gummies and Linzest (samples from GI) without resolution.   Continue OTC Metamucil gummies   Start Miralax one capful per day   Increase Miralax as needed  Major depressive disorder:   Conduct 7 day taper period   Reduce sertraline to 100mg once daily for 7 days  Start taking Effexor 75mg once daily for 7 days  After taper period as specified above, start Effexor 150mg once daily   Call clinic if mood symptoms worsen or become unmanageable   Consider increased Effexor dose and/or referral for CBT if Effexor 150mg proves inadequate  GERD without esophagitis: patient reports that his GERD symptoms are well-controlled with Protonix 20mg.   Refilled Protonix 20mg   Restless legs syndrome: symptoms reported as well-controlled with Requip, will plan to continue  Continue taking Requip   Action tremor: patient says that his action tremor is worse vs a few months ago.

## 2024-08-29 RX ORDER — CARVEDILOL 3.12 MG/1
TABLET ORAL
Refills: 0 | OUTPATIENT
Start: 2024-08-29

## 2024-08-29 RX ORDER — AMITRIPTYLINE HYDROCHLORIDE 10 MG/1
TABLET ORAL
Refills: 0 | OUTPATIENT
Start: 2024-08-29

## 2024-08-29 RX ORDER — OXYBUTYNIN CHLORIDE 5 MG/1
TABLET, EXTENDED RELEASE ORAL
Refills: 0 | OUTPATIENT
Start: 2024-08-29

## 2024-08-29 RX ORDER — ROSUVASTATIN CALCIUM 10 MG/1
10 TABLET, COATED ORAL DAILY
Qty: 90 TABLET | Refills: 1 | Status: ON HOLD | OUTPATIENT
Start: 2024-08-29

## 2024-08-29 RX ORDER — CLOPIDOGREL BISULFATE 75 MG/1
75 TABLET ORAL DAILY
Qty: 90 TABLET | Refills: 1 | Status: ON HOLD | OUTPATIENT
Start: 2024-08-29

## 2024-08-29 RX ORDER — PANTOPRAZOLE SODIUM 40 MG/1
TABLET, DELAYED RELEASE ORAL
Refills: 0 | OUTPATIENT
Start: 2024-08-29

## 2024-08-31 ENCOUNTER — APPOINTMENT (OUTPATIENT)
Dept: CT IMAGING | Age: 64
End: 2024-08-31
Payer: MEDICARE

## 2024-08-31 ENCOUNTER — HOSPITAL ENCOUNTER (INPATIENT)
Age: 64
LOS: 6 days | Discharge: HOME | End: 2024-09-06
Attending: EMERGENCY MEDICINE | Admitting: STUDENT IN AN ORGANIZED HEALTH CARE EDUCATION/TRAINING PROGRAM
Payer: MEDICARE

## 2024-08-31 DIAGNOSIS — N18.6 ESRD (END STAGE RENAL DISEASE) (HCC): ICD-10-CM

## 2024-08-31 DIAGNOSIS — N04.9 ANASARCA ASSOCIATED WITH DISORDER OF KIDNEY: ICD-10-CM

## 2024-08-31 DIAGNOSIS — M17.0 PRIMARY OSTEOARTHRITIS OF BOTH KNEES: ICD-10-CM

## 2024-08-31 DIAGNOSIS — R00.2 PALPITATIONS: ICD-10-CM

## 2024-08-31 DIAGNOSIS — M47.816 LUMBAR FACET ARTHROPATHY: ICD-10-CM

## 2024-08-31 DIAGNOSIS — R06.02 SHORTNESS OF BREATH: ICD-10-CM

## 2024-08-31 DIAGNOSIS — N18.6 ESRD (END STAGE RENAL DISEASE) ON DIALYSIS (HCC): ICD-10-CM

## 2024-08-31 DIAGNOSIS — M48.062 SPINAL STENOSIS OF LUMBAR REGION WITH NEUROGENIC CLAUDICATION: ICD-10-CM

## 2024-08-31 DIAGNOSIS — M25.561 CHRONIC PAIN OF BOTH KNEES: ICD-10-CM

## 2024-08-31 DIAGNOSIS — G89.4 CHRONIC PAIN SYNDROME: ICD-10-CM

## 2024-08-31 DIAGNOSIS — M47.816 SPONDYLOSIS OF LUMBAR REGION WITHOUT MYELOPATHY OR RADICULOPATHY: ICD-10-CM

## 2024-08-31 DIAGNOSIS — R53.81 PHYSICAL DECONDITIONING: ICD-10-CM

## 2024-08-31 DIAGNOSIS — G89.29 CHRONIC PAIN OF BOTH KNEES: ICD-10-CM

## 2024-08-31 DIAGNOSIS — M25.562 CHRONIC PAIN OF BOTH KNEES: ICD-10-CM

## 2024-08-31 DIAGNOSIS — G62.9 NEUROPATHY: ICD-10-CM

## 2024-08-31 DIAGNOSIS — E87.79 OTHER HYPERVOLEMIA: ICD-10-CM

## 2024-08-31 DIAGNOSIS — Z99.2 ESRD (END STAGE RENAL DISEASE) ON DIALYSIS (HCC): ICD-10-CM

## 2024-08-31 DIAGNOSIS — I21.4 NSTEMI (NON-ST ELEVATED MYOCARDIAL INFARCTION) (HCC): ICD-10-CM

## 2024-08-31 DIAGNOSIS — Z99.2 ADMISSION FOR DIALYSIS (HCC): Primary | ICD-10-CM

## 2024-08-31 PROBLEM — E87.70 VOLUME OVERLOAD: Status: ACTIVE | Noted: 2024-08-31

## 2024-08-31 LAB
ALBUMIN SERPL BCG-MCNC: 3.6 G/DL (ref 3.5–5.1)
ALP SERPL-CCNC: 79 U/L (ref 38–126)
ALT SERPL W/O P-5'-P-CCNC: 8 U/L (ref 11–66)
ANION GAP SERPL CALC-SCNC: 13 MEQ/L (ref 8–16)
AST SERPL-CCNC: 9 U/L (ref 5–40)
BASOPHILS ABSOLUTE: 0 THOU/MM3 (ref 0–0.1)
BASOPHILS NFR BLD AUTO: 0.7 %
BILIRUB SERPL-MCNC: 0.3 MG/DL (ref 0.3–1.2)
BUN SERPL-MCNC: 76 MG/DL (ref 7–22)
CA-I BLD ISE-SCNC: 1.16 MMOL/L (ref 1.12–1.32)
CALCIUM SERPL-MCNC: 8.6 MG/DL (ref 8.5–10.5)
CHLORIDE SERPL-SCNC: 103 MEQ/L (ref 98–111)
CO2 SERPL-SCNC: 22 MEQ/L (ref 23–33)
CREAT SERPL-MCNC: 6.8 MG/DL (ref 0.4–1.2)
DEPRECATED MEAN GLUCOSE BLD GHB EST-ACNC: 72 MG/DL (ref 70–126)
DEPRECATED RDW RBC AUTO: 52.6 FL (ref 35–45)
EKG ATRIAL RATE: 85 BPM
EKG P AXIS: -10 DEGREES
EKG P-R INTERVAL: 176 MS
EKG Q-T INTERVAL: 422 MS
EKG QRS DURATION: 192 MS
EKG QTC CALCULATION (BAZETT): 502 MS
EKG R AXIS: 12 DEGREES
EKG T AXIS: -30 DEGREES
EKG VENTRICULAR RATE: 85 BPM
EOSINOPHIL NFR BLD AUTO: 2.5 %
EOSINOPHILS ABSOLUTE: 0.2 THOU/MM3 (ref 0–0.4)
ERYTHROCYTE [DISTWIDTH] IN BLOOD BY AUTOMATED COUNT: 15.4 % (ref 11.5–14.5)
GFR SERPL CREATININE-BSD FRML MDRD: 9 ML/MIN/1.73M2
GLUCOSE BLD STRIP.AUTO-MCNC: 68 MG/DL (ref 70–108)
GLUCOSE SERPL-MCNC: 104 MG/DL (ref 70–108)
HBA1C MFR BLD HPLC: 4.4 % (ref 4.4–6.4)
HCT VFR BLD AUTO: 34 % (ref 42–52)
HGB BLD-MCNC: 10.7 GM/DL (ref 14–18)
IMM GRANULOCYTES # BLD AUTO: 0.03 THOU/MM3 (ref 0–0.07)
IMM GRANULOCYTES NFR BLD AUTO: 0.5 %
LIPASE SERPL-CCNC: 17.7 U/L (ref 5.6–51.3)
LYMPHOCYTES ABSOLUTE: 0.4 THOU/MM3 (ref 1–4.8)
LYMPHOCYTES NFR BLD AUTO: 6.5 %
MAGNESIUM SERPL-MCNC: 1.8 MG/DL (ref 1.6–2.4)
MAGNESIUM SERPL-MCNC: 1.8 MG/DL (ref 1.6–2.4)
MCH RBC QN AUTO: 29.5 PG (ref 26–33)
MCHC RBC AUTO-ENTMCNC: 31.5 GM/DL (ref 32.2–35.5)
MCV RBC AUTO: 93.7 FL (ref 80–94)
MONOCYTES ABSOLUTE: 0.5 THOU/MM3 (ref 0.4–1.3)
MONOCYTES NFR BLD AUTO: 8.8 %
NEUTROPHILS ABSOLUTE: 4.9 THOU/MM3 (ref 1.8–7.7)
NEUTROPHILS NFR BLD AUTO: 81 %
NRBC BLD AUTO-RTO: 0 /100 WBC
NT-PROBNP SERPL IA-MCNC: ABNORMAL PG/ML (ref 0–124)
OSMOLALITY SERPL CALC.SUM OF ELEC: 298.6 MOSMOL/KG (ref 275–300)
PLATELET # BLD AUTO: 179 THOU/MM3 (ref 130–400)
PMV BLD AUTO: 9.8 FL (ref 9.4–12.4)
POTASSIUM SERPL-SCNC: 3.4 MEQ/L (ref 3.5–5.2)
PROT SERPL-MCNC: 6.9 G/DL (ref 6.1–8)
RBC # BLD AUTO: 3.63 MILL/MM3 (ref 4.7–6.1)
SODIUM SERPL-SCNC: 138 MEQ/L (ref 135–145)
T4 FREE SERPL-MCNC: 0.82 NG/DL (ref 0.93–1.68)
TROPONIN, HIGH SENSITIVITY: 110 NG/L (ref 0–12)
TROPONIN, HIGH SENSITIVITY: 111 NG/L (ref 0–12)
TSH SERPL DL<=0.005 MIU/L-ACNC: 20.15 UIU/ML (ref 0.4–4.2)
WBC # BLD AUTO: 6.1 THOU/MM3 (ref 4.8–10.8)

## 2024-08-31 PROCEDURE — 90935 HEMODIALYSIS ONE EVALUATION: CPT | Performed by: INTERNAL MEDICINE

## 2024-08-31 PROCEDURE — 74176 CT ABD & PELVIS W/O CONTRAST: CPT

## 2024-08-31 PROCEDURE — 85025 COMPLETE CBC W/AUTO DIFF WBC: CPT

## 2024-08-31 PROCEDURE — 6370000000 HC RX 637 (ALT 250 FOR IP): Performed by: INTERNAL MEDICINE

## 2024-08-31 PROCEDURE — 90935 HEMODIALYSIS ONE EVALUATION: CPT

## 2024-08-31 PROCEDURE — 76376 3D RENDER W/INTRP POSTPROCES: CPT

## 2024-08-31 PROCEDURE — 83735 ASSAY OF MAGNESIUM: CPT

## 2024-08-31 PROCEDURE — 83690 ASSAY OF LIPASE: CPT

## 2024-08-31 PROCEDURE — 85014 HEMATOCRIT: CPT

## 2024-08-31 PROCEDURE — 99285 EMERGENCY DEPT VISIT HI MDM: CPT

## 2024-08-31 PROCEDURE — 99223 1ST HOSP IP/OBS HIGH 75: CPT | Performed by: STUDENT IN AN ORGANIZED HEALTH CARE EDUCATION/TRAINING PROGRAM

## 2024-08-31 PROCEDURE — 70450 CT HEAD/BRAIN W/O DYE: CPT

## 2024-08-31 PROCEDURE — 71250 CT THORAX DX C-: CPT

## 2024-08-31 PROCEDURE — 84484 ASSAY OF TROPONIN QUANT: CPT

## 2024-08-31 PROCEDURE — 85018 HEMOGLOBIN: CPT

## 2024-08-31 PROCEDURE — 84439 ASSAY OF FREE THYROXINE: CPT

## 2024-08-31 PROCEDURE — 85610 PROTHROMBIN TIME: CPT

## 2024-08-31 PROCEDURE — 84443 ASSAY THYROID STIM HORMONE: CPT

## 2024-08-31 PROCEDURE — 83036 HEMOGLOBIN GLYCOSYLATED A1C: CPT

## 2024-08-31 PROCEDURE — 83880 ASSAY OF NATRIURETIC PEPTIDE: CPT

## 2024-08-31 PROCEDURE — 82330 ASSAY OF CALCIUM: CPT

## 2024-08-31 PROCEDURE — 80053 COMPREHEN METABOLIC PANEL: CPT

## 2024-08-31 PROCEDURE — 72125 CT NECK SPINE W/O DYE: CPT

## 2024-08-31 PROCEDURE — 2060000000 HC ICU INTERMEDIATE R&B

## 2024-08-31 PROCEDURE — 36415 COLL VENOUS BLD VENIPUNCTURE: CPT

## 2024-08-31 PROCEDURE — 82948 REAGENT STRIP/BLOOD GLUCOSE: CPT

## 2024-08-31 PROCEDURE — 93005 ELECTROCARDIOGRAM TRACING: CPT

## 2024-08-31 PROCEDURE — 93010 ELECTROCARDIOGRAM REPORT: CPT | Performed by: NUCLEAR MEDICINE

## 2024-08-31 PROCEDURE — 99222 1ST HOSP IP/OBS MODERATE 55: CPT | Performed by: INTERNAL MEDICINE

## 2024-08-31 PROCEDURE — 5A1D70Z PERFORMANCE OF URINARY FILTRATION, INTERMITTENT, LESS THAN 6 HOURS PER DAY: ICD-10-PCS | Performed by: INTERNAL MEDICINE

## 2024-08-31 RX ORDER — VENLAFAXINE HYDROCHLORIDE 75 MG/1
75 CAPSULE, EXTENDED RELEASE ORAL DAILY
Status: DISCONTINUED | OUTPATIENT
Start: 2024-09-01 | End: 2024-08-31

## 2024-08-31 RX ORDER — PRIMIDONE 50 MG/1
50 TABLET ORAL NIGHTLY
Status: DISCONTINUED | OUTPATIENT
Start: 2024-08-31 | End: 2024-09-06 | Stop reason: HOSPADM

## 2024-08-31 RX ORDER — CLONIDINE HYDROCHLORIDE 0.1 MG/1
0.1 TABLET ORAL ONCE
Status: COMPLETED | OUTPATIENT
Start: 2024-08-31 | End: 2024-08-31

## 2024-08-31 RX ORDER — CLONIDINE HYDROCHLORIDE 0.1 MG/1
0.1 TABLET ORAL
Status: COMPLETED | OUTPATIENT
Start: 2024-08-31 | End: 2024-08-31

## 2024-08-31 RX ORDER — INSULIN LISPRO 100 [IU]/ML
0-4 INJECTION, SOLUTION INTRAVENOUS; SUBCUTANEOUS
Status: DISCONTINUED | OUTPATIENT
Start: 2024-09-01 | End: 2024-09-06 | Stop reason: HOSPADM

## 2024-08-31 RX ORDER — OXYCODONE AND ACETAMINOPHEN 10; 325 MG/1; MG/1
1 TABLET ORAL EVERY 8 HOURS PRN
Status: DISCONTINUED | OUTPATIENT
Start: 2024-08-31 | End: 2024-09-03 | Stop reason: SDUPTHER

## 2024-08-31 RX ORDER — POLYETHYLENE GLYCOL 3350 17 G/17G
17 POWDER, FOR SOLUTION ORAL DAILY PRN
Status: DISCONTINUED | OUTPATIENT
Start: 2024-08-31 | End: 2024-09-06 | Stop reason: HOSPADM

## 2024-08-31 RX ORDER — SERTRALINE HYDROCHLORIDE 100 MG/1
100 TABLET, FILM COATED ORAL DAILY
Status: DISCONTINUED | OUTPATIENT
Start: 2024-09-01 | End: 2024-09-06 | Stop reason: HOSPADM

## 2024-08-31 RX ORDER — SODIUM CHLORIDE 9 MG/ML
INJECTION, SOLUTION INTRAVENOUS PRN
Status: DISCONTINUED | OUTPATIENT
Start: 2024-08-31 | End: 2024-09-06 | Stop reason: HOSPADM

## 2024-08-31 RX ORDER — ACETAMINOPHEN 325 MG/1
650 TABLET ORAL EVERY 6 HOURS PRN
Status: DISCONTINUED | OUTPATIENT
Start: 2024-08-31 | End: 2024-09-06 | Stop reason: HOSPADM

## 2024-08-31 RX ORDER — ACETAMINOPHEN 650 MG/1
650 SUPPOSITORY RECTAL EVERY 6 HOURS PRN
Status: DISCONTINUED | OUTPATIENT
Start: 2024-08-31 | End: 2024-09-06 | Stop reason: HOSPADM

## 2024-08-31 RX ORDER — ASPIRIN 81 MG/1
81 TABLET ORAL DAILY
Status: DISCONTINUED | OUTPATIENT
Start: 2024-09-01 | End: 2024-09-06 | Stop reason: HOSPADM

## 2024-08-31 RX ORDER — ALBUTEROL SULFATE 90 UG/1
2 AEROSOL, METERED RESPIRATORY (INHALATION) EVERY 6 HOURS PRN
Status: DISCONTINUED | OUTPATIENT
Start: 2024-08-31 | End: 2024-09-06 | Stop reason: HOSPADM

## 2024-08-31 RX ORDER — ATORVASTATIN CALCIUM 40 MG/1
40 TABLET, FILM COATED ORAL NIGHTLY
Status: DISCONTINUED | OUTPATIENT
Start: 2024-08-31 | End: 2024-09-06 | Stop reason: HOSPADM

## 2024-08-31 RX ORDER — SODIUM CHLORIDE 0.9 % (FLUSH) 0.9 %
10 SYRINGE (ML) INJECTION PRN
Status: DISCONTINUED | OUTPATIENT
Start: 2024-08-31 | End: 2024-09-06 | Stop reason: HOSPADM

## 2024-08-31 RX ORDER — GLUCAGON 1 MG/ML
1 KIT INJECTION PRN
Status: DISCONTINUED | OUTPATIENT
Start: 2024-08-31 | End: 2024-09-06 | Stop reason: HOSPADM

## 2024-08-31 RX ORDER — ONDANSETRON 4 MG/1
4 TABLET, ORALLY DISINTEGRATING ORAL EVERY 8 HOURS PRN
Status: DISCONTINUED | OUTPATIENT
Start: 2024-08-31 | End: 2024-08-31

## 2024-08-31 RX ORDER — PROCHLORPERAZINE EDISYLATE 5 MG/ML
10 INJECTION INTRAMUSCULAR; INTRAVENOUS EVERY 6 HOURS PRN
Status: DISCONTINUED | OUTPATIENT
Start: 2024-08-31 | End: 2024-09-06 | Stop reason: HOSPADM

## 2024-08-31 RX ORDER — IPRATROPIUM BROMIDE AND ALBUTEROL SULFATE 2.5; .5 MG/3ML; MG/3ML
1 SOLUTION RESPIRATORY (INHALATION) EVERY 4 HOURS PRN
Status: DISCONTINUED | OUTPATIENT
Start: 2024-08-31 | End: 2024-09-06 | Stop reason: HOSPADM

## 2024-08-31 RX ORDER — MAGNESIUM SULFATE HEPTAHYDRATE 40 MG/ML
2000 INJECTION, SOLUTION INTRAVENOUS ONCE
Status: COMPLETED | OUTPATIENT
Start: 2024-08-31 | End: 2024-09-01

## 2024-08-31 RX ORDER — SEVELAMER CARBONATE 800 MG/1
800 TABLET, FILM COATED ORAL
Status: DISCONTINUED | OUTPATIENT
Start: 2024-09-01 | End: 2024-09-06 | Stop reason: HOSPADM

## 2024-08-31 RX ORDER — CLOPIDOGREL BISULFATE 75 MG/1
75 TABLET ORAL DAILY
Status: DISCONTINUED | OUTPATIENT
Start: 2024-09-01 | End: 2024-09-06 | Stop reason: HOSPADM

## 2024-08-31 RX ORDER — ONDANSETRON 2 MG/ML
4 INJECTION INTRAMUSCULAR; INTRAVENOUS EVERY 6 HOURS PRN
Status: DISCONTINUED | OUTPATIENT
Start: 2024-08-31 | End: 2024-08-31

## 2024-08-31 RX ORDER — HEPARIN SODIUM 5000 [USP'U]/ML
5000 INJECTION, SOLUTION INTRAVENOUS; SUBCUTANEOUS EVERY 8 HOURS SCHEDULED
Status: DISCONTINUED | OUTPATIENT
Start: 2024-08-31 | End: 2024-09-06 | Stop reason: HOSPADM

## 2024-08-31 RX ORDER — DEXTROSE MONOHYDRATE 100 MG/ML
INJECTION, SOLUTION INTRAVENOUS CONTINUOUS PRN
Status: DISCONTINUED | OUTPATIENT
Start: 2024-08-31 | End: 2024-09-06 | Stop reason: HOSPADM

## 2024-08-31 RX ORDER — INSULIN LISPRO 100 [IU]/ML
0-4 INJECTION, SOLUTION INTRAVENOUS; SUBCUTANEOUS NIGHTLY
Status: DISCONTINUED | OUTPATIENT
Start: 2024-08-31 | End: 2024-09-06 | Stop reason: HOSPADM

## 2024-08-31 RX ORDER — SODIUM CHLORIDE 0.9 % (FLUSH) 0.9 %
5-40 SYRINGE (ML) INJECTION EVERY 12 HOURS SCHEDULED
Status: DISCONTINUED | OUTPATIENT
Start: 2024-08-31 | End: 2024-09-06 | Stop reason: HOSPADM

## 2024-08-31 RX ORDER — LANOLIN ALCOHOL/MO/W.PET/CERES
6 CREAM (GRAM) TOPICAL NIGHTLY PRN
Status: DISCONTINUED | OUTPATIENT
Start: 2024-08-31 | End: 2024-09-06 | Stop reason: HOSPADM

## 2024-08-31 RX ORDER — PRIMIDONE 50 MG/1
100 TABLET ORAL EVERY MORNING
Status: DISCONTINUED | OUTPATIENT
Start: 2024-09-01 | End: 2024-09-06 | Stop reason: HOSPADM

## 2024-08-31 RX ORDER — PANTOPRAZOLE SODIUM 40 MG/1
40 TABLET, DELAYED RELEASE ORAL
Status: DISCONTINUED | OUTPATIENT
Start: 2024-09-01 | End: 2024-09-06 | Stop reason: HOSPADM

## 2024-08-31 RX ORDER — GABAPENTIN 300 MG/1
600 CAPSULE ORAL NIGHTLY
Status: DISCONTINUED | OUTPATIENT
Start: 2024-08-31 | End: 2024-09-06 | Stop reason: HOSPADM

## 2024-08-31 RX ORDER — CARVEDILOL 25 MG/1
25 TABLET ORAL 2 TIMES DAILY WITH MEALS
Status: DISCONTINUED | OUTPATIENT
Start: 2024-08-31 | End: 2024-09-06 | Stop reason: HOSPADM

## 2024-08-31 RX ORDER — ROPINIROLE 0.5 MG/1
0.5 TABLET, FILM COATED ORAL NIGHTLY
Status: DISCONTINUED | OUTPATIENT
Start: 2024-08-31 | End: 2024-09-06 | Stop reason: HOSPADM

## 2024-08-31 RX ADMIN — CLONIDINE HYDROCHLORIDE 0.1 MG: 0.1 TABLET ORAL at 18:03

## 2024-08-31 RX ADMIN — CLONIDINE HYDROCHLORIDE 0.1 MG: 0.1 TABLET ORAL at 19:52

## 2024-08-31 ASSESSMENT — PAIN - FUNCTIONAL ASSESSMENT: PAIN_FUNCTIONAL_ASSESSMENT: 0-10

## 2024-08-31 ASSESSMENT — PAIN SCALES - GENERAL
PAINLEVEL_OUTOF10: 8
PAINLEVEL_OUTOF10: 9

## 2024-08-31 NOTE — CONSULTS
[facility]    [address] [place], [place] [address]  [phone]     Hospital Consult  8/31/2024 7:16 PM    Pt Name:    Farhad Myles  MRN:     [id number]   [id number]  YOB: 1960  Admit Date:    8/31/2024  1:17 PM  Primary Care Physician:  Dulce Maria Crespo MD    CSN Number:   [id number]    Reason for Consult:  ESRD on dialysis  Requesting provider:  Hospitalist    History:   The patient is a 63 y.o. with history of ESRD on dialysis, obesity, hypertension, history of noncompliance.  Patient is on home hemodialysis.  He has a history of noncompliance with dialysis but this time patient apparently did not do dialysis for almost 3 weeks.  Nephrology was notified yesterday by his PCP that patient had presented to PCP office with complaints of shortness of breath and revealed to PCP that he had not done his dialysis treatments in almost 3 weeks.  Subsequently I spoke with my dialysis staff and they were not aware that patient had not done dialysis for that long.  Apparently he has been having problems climbing up stairs where his dialysis equipment is.  His dialysis equipment is scheduled to be moved to first floor next week but after learning from PCP that patient had not done any dialysis treatments for these many days- we called him yesterday and requested him to come to the emergency room.  Patient reported that he was swollen and had put on several pounds and was short of breath with exertion but he did not want to come to the emergency room last night.  Today he presented to the emergency room with complaints of shortness of breath, weight gain, progressively worsening lower extremity swelling.  He was evaluated in the emergency room where he was noted to have elevated beta natruretic peptide.  His creatinine was elevated.  Imaging study showed diffuse body wall edema.  He was admitted.  Nephrology consulted for management of ESRD.  Discussed with ER staff.    Past Medical  Physically Abused: No     Sexually Abused: No   Housing Stability: Unknown (8/23/2024)    Housing Stability Vital Sign     Unable to Pay for Housing in the Last Year: No     Number of Times Moved in the Last Year: Not on file     Homeless in the Last Year: No       Home Meds:  Prior to Admission medications    Medication Sig Start Date End Date Taking? Authorizing Provider   rosuvastatin (CRESTOR) 10 MG tablet Take 1 tablet by mouth daily 8/29/24   Mortimer, Connor, PA-C   clopidogrel (PLAVIX) 75 MG tablet Take 1 tablet by mouth daily 8/29/24   Mortimer, Connor, PA-C   sertraline (ZOLOFT) 100 MG tablet Take 1 tablet by mouth daily for 8 days 8/26/24 9/3/24  Brad Leach MD   primidone (MYSOLINE) 50 MG tablet Please take 2 tablets in the morning and 1 tablet in evening. Will be 3 tablets total per day. 8/26/24   Brad Leach MD   pantoprazole (PROTONIX) 20 MG tablet Take 1 tablet by mouth every morning (before breakfast) 8/26/24   Brad Leach MD   montelukast (SINGULAIR) 10 MG tablet Take 1 tablet by mouth nightly 8/26/24   Brad Leach MD   venlafaxine (EFFEXOR XR) 150 MG extended release capsule Take 1 capsule by mouth daily 9/3/24   Brad Leach MD   venlafaxine (EFFEXOR XR) 75 MG extended release capsule Take 1 capsule by mouth daily for 7 days 8/26/24 9/2/24  Brad Leach MD   polyethylene glycol (GLYCOLAX) 17 GM/SCOOP powder Take 17 g by mouth daily 8/26/24 2/22/25  Brad Leach MD   gabapentin (NEURONTIN) 300 MG capsule Take 2 capsules by mouth nightly for 30 days. 8/12/24 9/11/24  Joel Holcomb APRN - CNP   oxyCODONE-acetaminophen (PERCOCET)  MG per tablet Take 1 tablet by mouth every 8 hours as needed for Pain for up to 30 days. Intended supply: 30 days Max Daily Amount: 3 tablets 8/1/24 8/31/24  Marzec, Joel J, APRN - CNP   rOPINIRole (REQUIP) 0.5 MG tablet TAKE 1 TABLET IN THE AFTERNOON AND 2 TABLETS AT NIGHTTIME  Patient taking

## 2024-08-31 NOTE — ED TRIAGE NOTES
Presents to ER with concern of not having dialysis for 3 weeks. Reports his equipment needs to to changed in the home. He reports he has been having pain all over. Reports palpitations and swelling. Reports HR dropping 25 beats at times. Pt nephrologist Manav Myrick. Equipment to be moved 09/07. This RN to monitor

## 2024-08-31 NOTE — H&P
History & Physical       Patient: Farhad Myles  YOB: 1960    MRN: [id number]     Acct: [id number]    PCP: Dulce Maria Crespo MD    Date of Admission: 8/31/2024    Date of Service: Patient seen / examined on 08/31/24 and admitted to Inpatient with expected LOS greater than two midnights due to medical therapy.       ASSESSMENT / PLAN:  ESRD: With volume overload.  Undergoes on HD M/F.  Endorses noncompliance.  Clinically volume overloaded.  Limit nephrotoxic agents.  Nephrology consulted  NSTEMI: likely type II secondary to volume overload in the setting of ESRD.  Presenting proBNP 37262.  .  EKG TWI in anterior lateral leads.  Denies CP, endorses additional SOB.  Trend HST x 2 every 6 hours.  Repeat HST and check EKG if worsening symptomatology.  Check limited echo.  Obstructive CAD: S/p PCI to LAD x 2 5/2024. Follows outpatient with Dr. George, cardiology. On ASA, Plavix, statin and BB.    Chronic HFmrEF, NYHA III: Likely ICM +/- NICM.  Not exacerbation.  Last ECHO EF 45-50%, no RWMA, mild-moderate TR, RVSP 52 mmHg 5/2024.  proBNP elevated above baseline, with hypervolemia more likely volume overload vs exacerbation.  2 g sodium and 2 L fluid restriction.  Continue home diuretics.  Nephrology following for volume status.  Strict I&O's.  Monitor daily weights.  HTN: Continue Coreg 25 mg p.o. twice daily with holding parameters  HLD: Continue Crestor 10 mg p.o. daily  NIDDMII: Associated with neuropathy.  Last HbA1c 4.8 8/2023.  On no home antidiabetic regimen, possibly diet controlled.  Repeat HgbA1c..  Started on low-dose insulin/scale with Accu-Cheks.  Monitor blood glucose with daily BMP.  Adjust insulin currently.  Hypoglycemic recalls initiated.  Continue home gabapentin 600 mg p.o. nightly.    Chronic normocytic anemia: Likely secondary to renal disease, see above.  Baseline Hgb 9-10.  No obvious signs of bleeding.  Monitor Hgb with daily CC.  Transfuse PRBC if Hgb <7.0 or  (L) 14.0 - 18.0 gm/dl    Hematocrit 34.0 (L) 42.0 - 52.0 %    MCV 93.7 80.0 - 94.0 fL    MCH 29.5 26.0 - 33.0 pg    MCHC 31.5 (L) 32.2 - 35.5 gm/dl    RDW-CV 15.4 (H) 11.5 - 14.5 %    RDW-SD 52.6 (H) 35.0 - 45.0 fL    Platelets 179 130 - 400 thou/mm3    MPV 9.8 9.4 - 12.4 fL    Seg Neutrophils 81.0 %    Lymphocytes 6.5 %    Monocytes % 8.8 %    Eosinophils 2.5 %    Basophils 0.7 %    Immature Granulocytes % 0.5 %    Neutrophils Absolute 4.9 1.8 - 7.7 thou/mm3    Lymphocytes Absolute 0.4 (L) 1.0 - 4.8 thou/mm3    Monocytes Absolute 0.5 0.4 - 1.3 thou/mm3    Eosinophils Absolute 0.2 0.0 - 0.4 thou/mm3    Basophils Absolute 0.0 0.0 - 0.1 thou/mm3    Immature Grans (Abs) 0.03 0.00 - 0.07 thou/mm3    nRBC 0 /100 wbc   Comprehensive Metabolic Panel   Result Value Ref Range    Glucose 104 70 - 108 mg/dL    Creatinine 6.8 (HH) 0.4 - 1.2 mg/dL    BUN 76 (H) 7 - 22 mg/dL    Sodium 138 135 - 145 meq/L    Potassium 3.4 (L) 3.5 - 5.2 meq/L    Chloride 103 98 - 111 meq/L    CO2 22 (L) 23 - 33 meq/L    Calcium 8.6 8.5 - 10.5 mg/dL    AST 9 5 - 40 U/L    Alkaline Phosphatase 79 38 - 126 U/L    Total Protein 6.9 6.1 - 8.0 g/dL    Albumin 3.6 3.5 - 5.1 g/dL    Total Bilirubin 0.3 0.3 - 1.2 mg/dL    ALT 8 (L) 11 - 66 U/L   Troponin   Result Value Ref Range    Troponin, High Sensitivity 110 (H) 0 - 12 ng/L   Brain Natriuretic Peptide   Result Value Ref Range    Pro-BNP 58431.0 (H) 0.0 - 124.0 pg/mL   Lipase   Result Value Ref Range    Lipase 17.7 5.6 - 51.3 U/L   Magnesium   Result Value Ref Range    Magnesium 1.8 1.6 - 2.4 mg/dL   Calcium, Ionized   Result Value Ref Range    Calcium, Ionized 1.16 1.12 - 1.32 mmol/L   Anion Gap   Result Value Ref Range    Anion Gap 13.0 8.0 - 16.0 meq/L   Glomerular Filtration Rate, Estimated   Result Value Ref Range    Est, Glom Filt Rate 9 (A) >60 ml/min/1.73m2   Osmolality   Result Value Ref Range    Osmolality Calc 298.6 275.0 - 300.0 mOsmol/kg   EKG 12 Lead   Result Value Ref Range    Ventricular

## 2024-08-31 NOTE — ED PROVIDER NOTES
[facility] EMERGENCY DEPT  EMERGENCY DEPARTMENT ENCOUNTER          Pt Name: Farhad Myles  MRN: [id number]  Birthdate 1960  Date of evaluation: 8/31/2024  Physician: John Anthony MD  Supervising Attending Physician: Tanner Cummings DO       CHIEF COMPLAINT       Chief Complaint   Patient presents with    no dialysis for 3 weeks     Palpitations         HISTORY OF PRESENT ILLNESS    HPI  Farhad Myles is a 63 y.o. male with PMH significant for HTN, DMT2, ESRD on at-home HD (M-F), LAURIE on BiPAP, restrictive airway disease (on 4L O2 via NC at home at all times), CHF who presents to the emergency department from home for evaluation of palpitations, anasarca, \"pain all over\", increased exertional supplemental O2 requirements after not receiving dialysis for the past 3 weeks.  Patient states that he has at home hemodialysis that is located on the upper level of his home and he is unable to ascend stairs therefore has not been able to receive treatment over the past 3 weeks.  States that he has people coming over on Thursday in order to move the dialysis machine downstairs.  Follows with Dr. Em.  States that he called Dr. Em yesterday and he recommended that the patient come to the ED immediately for evaluation, however patient states that his brother had come in town and was only staying overnight and has not seen his brother in 3 years therefore patient decided not to go to the ED until today.  Patient reports that he has had increased swelling over the past 2 weeks as well as muscle aches (especially generalized abdominal pain), shortness of breath especially with exertion (requiring new 5L supplemental O2 with exertion), palpitations, and increased variability of HR.  Of note, patient states that he still produces urine - approximately 6-7x per day.  Does note pain just at the end of micturition. Also notes RUE and RLE weakness as well as right eyelid drooping over the past 2  Appears to be at baseline. [MA]   1436 EKG 12 Lead  Sinus rhythm with fusion complexes at 85 bpm, , QRS 92, QTc 502, normal axis, T wave inversions in anterolateral leads, RBBB [MA]   1447 Troponin, High Sensitivity(!): 110  Elevated from baseline, will repeat after 2h [MA]   1447 Potassium(!): 3.4 [MA]   1447 Creatinine(!!): 6.8  In proportion to underlying ESRD [MA]   1511 Pro-BNP(!): 46260.0  ~4x increase from prior [MA]   1512 Calcium, Ionized: 1.16  WNL [MA]   1512 CT Head W/O Contrast  No acute process [MA]      ED Course User Index  [MA] John Anthony MD       PROCEDURES: (None if blank)  Procedures:     CRITICAL CARE:  None    MEDICATION CHANGES     New Prescriptions    No medications on file       FINAL DISPOSITION   Shared Decision-Making was performed, disposition discussed with the patient/family and questions answered.    Outpatient follow up (If applicable):  No follow-up provider specified.  Not applicable          FINAL DIAGNOSES:  Final diagnoses:   Anasarca associated with disorder of kidney   ESRD (end stage renal disease) on dialysis (HCC)   Shortness of breath   Palpitations   Physical deconditioning   Admission for dialysis (HCC)       Condition: condition: stable  Dispo: Admit to med/surg floor  DISPOSITION Decision To Admit 08/31/2024 03:47:27 PM  Condition at Disposition: Stable      This transcription was electronically signed. It was dictated by use of voice recognition software and electronically transcribed. The transcription may contain errors not detected in proofreading.

## 2024-08-31 NOTE — PROGRESS NOTES
Patient seen and examined on dialysis  Tolerating hemodialysis treatment well  Ultrafiltration goal 4 kg  Vitals noted  Blood flow rate of 350, dialysate flow rate of 700  Using 3K bath  Discussed with HD RN

## 2024-09-01 PROBLEM — Z99.2 ADMISSION FOR DIALYSIS (HCC): Status: ACTIVE | Noted: 2024-09-01

## 2024-09-01 LAB
ANION GAP SERPL CALC-SCNC: 15 MEQ/L (ref 8–16)
BUN SERPL-MCNC: 48 MG/DL (ref 7–22)
CALCIUM SERPL-MCNC: 8.4 MG/DL (ref 8.5–10.5)
CHLORIDE SERPL-SCNC: 103 MEQ/L (ref 98–111)
CO2 SERPL-SCNC: 22 MEQ/L (ref 23–33)
CREAT SERPL-MCNC: 4.9 MG/DL (ref 0.4–1.2)
DEPRECATED RDW RBC AUTO: 52.6 FL (ref 35–45)
EKG ATRIAL RATE: 60 BPM
EKG P AXIS: 80 DEGREES
EKG P-R INTERVAL: 216 MS
EKG Q-T INTERVAL: 524 MS
EKG QRS DURATION: 182 MS
EKG QTC CALCULATION (BAZETT): 524 MS
EKG R AXIS: 31 DEGREES
EKG T AXIS: -33 DEGREES
EKG VENTRICULAR RATE: 60 BPM
ERYTHROCYTE [DISTWIDTH] IN BLOOD BY AUTOMATED COUNT: 15.4 % (ref 11.5–14.5)
GFR SERPL CREATININE-BSD FRML MDRD: 13 ML/MIN/1.73M2
GLUCOSE BLD STRIP.AUTO-MCNC: 102 MG/DL (ref 70–108)
GLUCOSE BLD STRIP.AUTO-MCNC: 108 MG/DL (ref 70–108)
GLUCOSE BLD STRIP.AUTO-MCNC: 127 MG/DL (ref 70–108)
GLUCOSE BLD STRIP.AUTO-MCNC: 85 MG/DL (ref 70–108)
GLUCOSE SERPL-MCNC: 98 MG/DL (ref 70–108)
HCT VFR BLD AUTO: 30.6 % (ref 42–52)
HCT VFR BLD AUTO: 33.8 % (ref 42–52)
HGB BLD-MCNC: 10.7 GM/DL (ref 14–18)
HGB BLD-MCNC: 9.5 GM/DL (ref 14–18)
INR PPP: 1.24 (ref 0.85–1.13)
MAGNESIUM SERPL-MCNC: 2.1 MG/DL (ref 1.6–2.4)
MCH RBC QN AUTO: 29 PG (ref 26–33)
MCHC RBC AUTO-ENTMCNC: 31 GM/DL (ref 32.2–35.5)
MCV RBC AUTO: 93.3 FL (ref 80–94)
MRSA DNA SPEC QL NAA+PROBE: NEGATIVE
PLATELET # BLD AUTO: 135 THOU/MM3 (ref 130–400)
PMV BLD AUTO: 10.1 FL (ref 9.4–12.4)
POTASSIUM SERPL-SCNC: 3.4 MEQ/L (ref 3.5–5.2)
RBC # BLD AUTO: 3.28 MILL/MM3 (ref 4.7–6.1)
SODIUM SERPL-SCNC: 140 MEQ/L (ref 135–145)
TROPONIN, HIGH SENSITIVITY: 109 NG/L (ref 0–12)
WBC # BLD AUTO: 6.7 THOU/MM3 (ref 4.8–10.8)

## 2024-09-01 PROCEDURE — 83735 ASSAY OF MAGNESIUM: CPT

## 2024-09-01 PROCEDURE — 93005 ELECTROCARDIOGRAM TRACING: CPT | Performed by: STUDENT IN AN ORGANIZED HEALTH CARE EDUCATION/TRAINING PROGRAM

## 2024-09-01 PROCEDURE — 6370000000 HC RX 637 (ALT 250 FOR IP)

## 2024-09-01 PROCEDURE — 93010 ELECTROCARDIOGRAM REPORT: CPT | Performed by: NUCLEAR MEDICINE

## 2024-09-01 PROCEDURE — 6360000002 HC RX W HCPCS

## 2024-09-01 PROCEDURE — 84484 ASSAY OF TROPONIN QUANT: CPT

## 2024-09-01 PROCEDURE — 99222 1ST HOSP IP/OBS MODERATE 55: CPT | Performed by: SURGERY

## 2024-09-01 PROCEDURE — 87641 MR-STAPH DNA AMP PROBE: CPT

## 2024-09-01 PROCEDURE — 99233 SBSQ HOSP IP/OBS HIGH 50: CPT | Performed by: STUDENT IN AN ORGANIZED HEALTH CARE EDUCATION/TRAINING PROGRAM

## 2024-09-01 PROCEDURE — 2500000003 HC RX 250 WO HCPCS: Performed by: STUDENT IN AN ORGANIZED HEALTH CARE EDUCATION/TRAINING PROGRAM

## 2024-09-01 PROCEDURE — 85027 COMPLETE CBC AUTOMATED: CPT

## 2024-09-01 PROCEDURE — 2060000000 HC ICU INTERMEDIATE R&B

## 2024-09-01 PROCEDURE — 2580000003 HC RX 258: Performed by: STUDENT IN AN ORGANIZED HEALTH CARE EDUCATION/TRAINING PROGRAM

## 2024-09-01 PROCEDURE — 82948 REAGENT STRIP/BLOOD GLUCOSE: CPT

## 2024-09-01 PROCEDURE — 80048 BASIC METABOLIC PNL TOTAL CA: CPT

## 2024-09-01 PROCEDURE — 6370000000 HC RX 637 (ALT 250 FOR IP): Performed by: STUDENT IN AN ORGANIZED HEALTH CARE EDUCATION/TRAINING PROGRAM

## 2024-09-01 PROCEDURE — 6370000000 HC RX 637 (ALT 250 FOR IP): Performed by: INTERNAL MEDICINE

## 2024-09-01 PROCEDURE — 36415 COLL VENOUS BLD VENIPUNCTURE: CPT

## 2024-09-01 PROCEDURE — 99232 SBSQ HOSP IP/OBS MODERATE 35: CPT | Performed by: INTERNAL MEDICINE

## 2024-09-01 RX ORDER — OXYCODONE AND ACETAMINOPHEN 5; 325 MG/1; MG/1
2 TABLET ORAL EVERY 4 HOURS PRN
Status: DISCONTINUED | OUTPATIENT
Start: 2024-09-01 | End: 2024-09-06 | Stop reason: HOSPADM

## 2024-09-01 RX ORDER — POTASSIUM CHLORIDE 1500 MG/1
20 TABLET, EXTENDED RELEASE ORAL ONCE
Status: COMPLETED | OUTPATIENT
Start: 2024-09-01 | End: 2024-09-01

## 2024-09-01 RX ORDER — OXYCODONE AND ACETAMINOPHEN 5; 325 MG/1; MG/1
1 TABLET ORAL EVERY 4 HOURS PRN
Status: DISCONTINUED | OUTPATIENT
Start: 2024-09-01 | End: 2024-09-06 | Stop reason: HOSPADM

## 2024-09-01 RX ORDER — NEOMYCIN/BACITRACIN/POLYMYXINB 3.5-400-5K
OINTMENT (GRAM) TOPICAL 2 TIMES DAILY
Status: DISCONTINUED | OUTPATIENT
Start: 2024-09-01 | End: 2024-09-06 | Stop reason: HOSPADM

## 2024-09-01 RX ADMIN — GABAPENTIN 600 MG: 300 CAPSULE ORAL at 19:52

## 2024-09-01 RX ADMIN — OXYCODONE HYDROCHLORIDE AND ACETAMINOPHEN 2 TABLET: 5; 325 TABLET ORAL at 23:36

## 2024-09-01 RX ADMIN — SEVELAMER CARBONATE 800 MG: 800 TABLET, FILM COATED ORAL at 13:10

## 2024-09-01 RX ADMIN — SODIUM CHLORIDE, PRESERVATIVE FREE 10 ML: 5 INJECTION INTRAVENOUS at 09:08

## 2024-09-01 RX ADMIN — CARVEDILOL 25 MG: 25 TABLET, FILM COATED ORAL at 01:16

## 2024-09-01 RX ADMIN — POTASSIUM CHLORIDE 20 MEQ: 1500 TABLET, EXTENDED RELEASE ORAL at 14:31

## 2024-09-01 RX ADMIN — SEVELAMER CARBONATE 800 MG: 800 TABLET, FILM COATED ORAL at 18:04

## 2024-09-01 RX ADMIN — HYDROMORPHONE HYDROCHLORIDE 0.5 MG: 1 INJECTION, SOLUTION INTRAMUSCULAR; INTRAVENOUS; SUBCUTANEOUS at 00:28

## 2024-09-01 RX ADMIN — OXYCODONE HYDROCHLORIDE AND ACETAMINOPHEN 2 TABLET: 5; 325 TABLET ORAL at 18:03

## 2024-09-01 RX ADMIN — OXYCODONE HYDROCHLORIDE AND ACETAMINOPHEN 1 TABLET: 5; 325 TABLET ORAL at 14:05

## 2024-09-01 RX ADMIN — PRIMIDONE 50 MG: 50 TABLET ORAL at 01:16

## 2024-09-01 RX ADMIN — CARVEDILOL 25 MG: 25 TABLET, FILM COATED ORAL at 09:03

## 2024-09-01 RX ADMIN — PANTOPRAZOLE SODIUM 40 MG: 40 TABLET, DELAYED RELEASE ORAL at 06:14

## 2024-09-01 RX ADMIN — BACITRACIN ZINC NEOMYCIN SULFATE POLYMYXIN B SULFATE: 400; 3.5; 5 OINTMENT TOPICAL at 09:07

## 2024-09-01 RX ADMIN — SERTRALINE 100 MG: 100 TABLET, FILM COATED ORAL at 09:03

## 2024-09-01 RX ADMIN — OXYCODONE AND ACETAMINOPHEN 1 TABLET: 10; 325 TABLET ORAL at 06:14

## 2024-09-01 RX ADMIN — PRIMIDONE 100 MG: 50 TABLET ORAL at 09:03

## 2024-09-01 RX ADMIN — PRIMIDONE 50 MG: 50 TABLET ORAL at 19:52

## 2024-09-01 RX ADMIN — ROPINIROLE HYDROCHLORIDE 0.5 MG: 0.5 TABLET, FILM COATED ORAL at 01:15

## 2024-09-01 RX ADMIN — CARVEDILOL 25 MG: 25 TABLET, FILM COATED ORAL at 18:04

## 2024-09-01 RX ADMIN — SODIUM CHLORIDE, PRESERVATIVE FREE 10 ML: 5 INJECTION INTRAVENOUS at 00:25

## 2024-09-01 RX ADMIN — GABAPENTIN 600 MG: 300 CAPSULE ORAL at 01:15

## 2024-09-01 RX ADMIN — BACITRACIN ZINC NEOMYCIN SULFATE POLYMYXIN B SULFATE: 400; 3.5; 5 OINTMENT TOPICAL at 04:05

## 2024-09-01 RX ADMIN — PROCHLORPERAZINE EDISYLATE 10 MG: 5 INJECTION INTRAMUSCULAR; INTRAVENOUS at 01:16

## 2024-09-01 RX ADMIN — ATORVASTATIN CALCIUM 40 MG: 40 TABLET, FILM COATED ORAL at 19:52

## 2024-09-01 RX ADMIN — MAGNESIUM SULFATE HEPTAHYDRATE 2000 MG: 40 INJECTION, SOLUTION INTRAVENOUS at 01:12

## 2024-09-01 RX ADMIN — LEVOTHYROXINE SODIUM 175 MCG: 0.15 TABLET ORAL at 09:03

## 2024-09-01 RX ADMIN — BACITRACIN ZINC NEOMYCIN SULFATE POLYMYXIN B SULFATE: 400; 3.5; 5 OINTMENT TOPICAL at 19:52

## 2024-09-01 RX ADMIN — HYDROMORPHONE HYDROCHLORIDE 0.5 MG: 1 INJECTION, SOLUTION INTRAMUSCULAR; INTRAVENOUS; SUBCUTANEOUS at 00:01

## 2024-09-01 RX ADMIN — ROPINIROLE HYDROCHLORIDE 0.5 MG: 0.5 TABLET, FILM COATED ORAL at 19:52

## 2024-09-01 RX ADMIN — ATORVASTATIN CALCIUM 40 MG: 40 TABLET, FILM COATED ORAL at 01:15

## 2024-09-01 RX ADMIN — SODIUM CHLORIDE, PRESERVATIVE FREE 10 ML: 5 INJECTION INTRAVENOUS at 19:52

## 2024-09-01 RX ADMIN — SEVELAMER CARBONATE 800 MG: 800 TABLET, FILM COATED ORAL at 09:03

## 2024-09-01 ASSESSMENT — PAIN DESCRIPTION - FREQUENCY
FREQUENCY: CONTINUOUS
FREQUENCY: CONTINUOUS
FREQUENCY: INTERMITTENT
FREQUENCY: CONTINUOUS

## 2024-09-01 ASSESSMENT — PAIN DESCRIPTION - ORIENTATION
ORIENTATION: RIGHT;LEFT
ORIENTATION: RIGHT;LEFT
ORIENTATION_2: LOWER
ORIENTATION: RIGHT;LEFT
ORIENTATION: RIGHT;LEFT

## 2024-09-01 ASSESSMENT — PAIN DESCRIPTION - LOCATION
LOCATION: GENERALIZED
LOCATION: GENERALIZED;HEAD;BACK
LOCATION: FACE
LOCATION: FACE
LOCATION: FACE;HEAD
LOCATION: GENERALIZED
LOCATION: GENERALIZED
LOCATION: FACE
LOCATION: FACE
LOCATION: GENERALIZED
LOCATION_2: BACK
LOCATION: FACE;BACK

## 2024-09-01 ASSESSMENT — PAIN - FUNCTIONAL ASSESSMENT
PAIN_FUNCTIONAL_ASSESSMENT_SITE2: PREVENTS OR INTERFERES SOME ACTIVE ACTIVITIES AND ADLS
PAIN_FUNCTIONAL_ASSESSMENT: ACTIVITIES ARE NOT PREVENTED
PAIN_FUNCTIONAL_ASSESSMENT: PREVENTS OR INTERFERES SOME ACTIVE ACTIVITIES AND ADLS
PAIN_FUNCTIONAL_ASSESSMENT: ACTIVITIES ARE NOT PREVENTED

## 2024-09-01 ASSESSMENT — PAIN DESCRIPTION - ONSET
ONSET: ON-GOING

## 2024-09-01 ASSESSMENT — PAIN DESCRIPTION - DESCRIPTORS
DESCRIPTORS: ACHING;DISCOMFORT
DESCRIPTORS: ACHING
DESCRIPTORS: ACHING;DISCOMFORT
DESCRIPTORS: ACHING
DESCRIPTORS: ACHING;DISCOMFORT
DESCRIPTORS: ACHING;DISCOMFORT
DESCRIPTORS: ACHING;SORE
DESCRIPTORS: ACHING
DESCRIPTORS: ACHING;SORE
DESCRIPTORS_2: THROBBING
DESCRIPTORS: ACHING

## 2024-09-01 ASSESSMENT — PAIN SCALES - GENERAL
PAINLEVEL_OUTOF10: 8
PAINLEVEL_OUTOF10: 6
PAINLEVEL_OUTOF10: 8
PAINLEVEL_OUTOF10: 6
PAINLEVEL_OUTOF10: 5
PAINLEVEL_OUTOF10: 9
PAINLEVEL_OUTOF10: 10
PAINLEVEL_OUTOF10: 4
PAINLEVEL_OUTOF10: 10
PAINLEVEL_OUTOF10: 3
PAINLEVEL_OUTOF10: 7

## 2024-09-01 ASSESSMENT — PAIN DESCRIPTION - PAIN TYPE
TYPE: ACUTE PAIN
TYPE: ACUTE PAIN
TYPE: ACUTE PAIN;CHRONIC PAIN

## 2024-09-01 ASSESSMENT — PAIN DESCRIPTION - INTENSITY: RATING_2: 7

## 2024-09-01 NOTE — PROCEDURES
PROCEDURE NOTE  Date: 9/1/2024   Name: Farhad Myles  YOB: 1960    Procedures  12 lead EKG completed. Results handed to Cristofer MATUTE.

## 2024-09-01 NOTE — PLAN OF CARE
Problem: Discharge Planning  Goal: Discharge to home or other facility with appropriate resources  Outcome: Progressing  Flowsheets (Taken 9/1/2024 0225)  Discharge to home or other facility with appropriate resources:   Identify barriers to discharge with patient and caregiver   Identify discharge learning needs (meds, wound care, etc)     Problem: Pain  Goal: Verbalizes/displays adequate comfort level or baseline comfort level  Outcome: Progressing  Flowsheets (Taken 9/1/2024 0225)  Verbalizes/displays adequate comfort level or baseline comfort level:   Encourage patient to monitor pain and request assistance   Administer analgesics based on type and severity of pain and evaluate response     Problem: Safety - Adult  Goal: Free from fall injury  Outcome: Progressing  Flowsheets (Taken 9/1/2024 0225)  Free From Fall Injury: Instruct family/caregiver on patient safety     Problem: Nutrition Deficit:  Goal: Optimize nutritional status  Outcome: Progressing  Flowsheets (Taken 9/1/2024 0225)  Nutrient intake appropriate for improving, restoring, or maintaining nutritional needs:   Assess nutritional status and recommend course of action   Order, calculate, and assess calorie counts as needed   Monitor oral intake, labs, and treatment plans     Problem: Skin/Tissue Integrity  Goal: Absence of new skin breakdown  Description: 1.  Monitor for areas of redness and/or skin breakdown  2.  Assess vascular access sites hourly  3.  Every 4-6 hours minimum:  Change oxygen saturation probe site  4.  Every 4-6 hours:  If on nasal continuous positive airway pressure, respiratory therapy assess nares and determine need for appliance change or resting period.  Outcome: Progressing  Note: Pt will remain free of new skinbreakdown

## 2024-09-01 NOTE — PROGRESS NOTES
[facility]   Nephrology progress note  9/1/2024, 1:41 PM      Pt Name:    Farhad Myles  MRN:     [id number]     YOB: 1960  Admit Date:    8/31/2024  1:17 PM    Chief Complaint: Nephrology following for ESRD and dialysis  Subjective:  Patient was seen and examined this morning  Events noted  Spoke with hospitalist this morning  Patient fell overnight.  He reports that he got tangled up in his gown and tripped and fell  No chest pain or shortness of breath      Objective:  24HR INTAKE/OUTPUT:    Intake/Output Summary (Last 24 hours) at 9/1/2024 1341  Last data filed at 9/1/2024 0845  Gross per 24 hour   Intake 2760 ml   Output 4650 ml   Net -1890 ml         I/O last 3 completed shifts:  In: 2520 [P.O.:120; I.V.:2000]  Out: 4650 [Urine:250]  I/O this shift:  In: 240 [P.O.:240]  Out: -    Admission weight: (!) 148 kg (326 lb 4.5 oz)  Wt Readings from Last 3 Encounters:   09/01/24 (!) 141 kg (310 lb 13.6 oz)   08/26/24 (!) 145.8 kg (321 lb 6.4 oz)   08/21/24 (!) 142.4 kg (314 lb)        Vitals :   Vitals:    09/01/24 0600 09/01/24 0614 09/01/24 0845 09/01/24 1125   BP:   (!) 175/88 (!) 164/81   Pulse:   62 56   Resp:  18 16 18   Temp:   98.2 °F (36.8 °C) 97.6 °F (36.4 °C)   TempSrc:   Oral Oral   SpO2:   98% 99%   Weight: (!) 141 kg (310 lb 13.6 oz)      Height:           Physical examination  General Appearance: + Prominent bruise, periorbital ecchymosis  Mouth/Throat: Oral mucosa moist  Neck: No JVD  Lungs: Air entry B/L, no use of accessory muscles  Heart:  S1, S2 heard  GI: soft, non-tender, no guarding  Extremities: Bilateral 2+ LE edema    Medications:  Infusion:    dextrose      sodium chloride       Meds:    neomycin-bacitracin-polymyxin   Topical BID    [Held by provider] aspirin EC  81 mg Oral Daily    atorvastatin  40 mg Oral Nightly    carvedilol  25 mg Oral BID with meals    [Held by provider] clopidogrel  75 mg Oral Daily    gabapentin  600 mg Oral Nightly

## 2024-09-01 NOTE — SIGNIFICANT EVENT
RR called at 2256. Patient was found down on the ground bleeding in the bathroom with a significant amount of clots noted on the ground next to him. Patient appeared to have fallen on the ground striking his head on the forehead with 2 noticeable lacerations, 1 laceration to upper lip and 1 small laceration to bridge of nose. Patient denies loss of consciousness, numbness, tingling, extremity pain, shortness of breath, dizziness, vomiting or diarrhea. Patient does endorse severe pain from head where he got struck. States that he did not pass out and that he was trying to readjust himself on the toilet when he fell stating, \"that tammie deleonwn got in the way.\" VSS at this time and patient rates pain 7/10 to his head that is throbbing. Admits to minor pain to back and legs and does not describe the pain quality. Patient was placed on backboard and C-collar applied per nursing prior to moving patient. Patient was placed onto bed and prepared for CT scans.    2320 patient was taken to CT scan for pan scanning/trauma. Dilaudid was ordered for comfort. Patient has remained alert and oriented during the event.     Upon returning to room C-collar remains in place and patient was cleaned up to further evaluate injuries. Noted significant bruising to forehead, lacerations were noted to be superficial not requiring any suturing. Pain medication administered, ice packs and neosporin applied to injuries. Awaiting CT scans for further treatment.

## 2024-09-01 NOTE — PLAN OF CARE
Problem: Discharge Planning  Goal: Discharge to home or other facility with appropriate resources  Outcome: /[facility] Progressing  Flowsheets (Taken 9/1/2024 0225 by Andrea Santacruz, RN)  Discharge to home or other facility with appropriate resources:   Identify barriers to discharge with patient and caregiver   Identify discharge learning needs (meds, wound care, etc)     Problem: Pain  Goal: Verbalizes/displays adequate comfort level or baseline comfort level  Outcome: /[facility] Progressing  Flowsheets (Taken 9/1/2024 0225 by Andrea Santacruz, RN)  Verbalizes/displays adequate comfort level or baseline comfort level:   Encourage patient to monitor pain and request assistance   Administer analgesics based on type and severity of pain and evaluate response     Problem: Safety - Adult  Goal: Free from fall injury  Outcome: /[facility] Progressing  Flowsheets (Taken 9/1/2024 0225 by Andrea Santacruz, RN)  Free From Fall Injury: Instruct family/caregiver on patient safety     Problem: Nutrition Deficit:  Goal: Optimize nutritional status  Outcome: /[facility] Progressing  Flowsheets (Taken 9/1/2024 0225 by Andrea Santacruz, RN)  Nutrient intake appropriate for improving, restoring, or maintaining nutritional needs:   Assess nutritional status and recommend course of action   Order, calculate, and assess calorie counts as needed   Monitor oral intake, labs, and treatment plans     Problem: Skin/Tissue Integrity  Goal: Absence of new skin breakdown  Description: 1.  Monitor for areas of redness and/or skin breakdown  2.  Assess vascular access sites hourly  3.  Every 4-6 hours minimum:  Change oxygen saturation probe site  4.  Every 4-6 hours:  If on nasal continuous positive airway pressure, respiratory therapy assess nares and determine need for appliance change or resting period.  Outcome: /[facility] Progressing  Note: Pt able to reposition self in bed.  Nurse makes adjustments as necessary.     Problem: Chronic Conditions and  Co-morbidities  Goal: Patient's chronic conditions and co-morbidity symptoms are monitored and maintained or improved  Outcome: HH/[facility] Progressing  Flowsheets (Taken 9/1/2024 2598)  Care Plan - Patient's Chronic Conditions and Co-Morbidity Symptoms are Monitored and Maintained or Improved:   Monitor and assess patient's chronic conditions and comorbid symptoms for stability, deterioration, or improvement   Collaborate with multidisciplinary team to address chronic and comorbid conditions and prevent exacerbation or deterioration

## 2024-09-01 NOTE — PROGRESS NOTES
Hospitalist Progress Note    Patient:  Farhad Myles      Unit/Bed:4K-24/024-A    YOB: 1960    MRN: [id number]       Acct: [id number]     PCP: Dulce Maria Crespo MD    Date of Admission: 8/31/2024    Assessment/Plan:    ESRD: With volume overload.  Undergoes on HD M/F.  Endorses noncompliance.  Clinically volume overloaded.  Limit nephrotoxic agents.  Nephrology consulted  NSTEMI: likely type II secondary to volume overload in the setting of ESRD.  Presenting proBNP 67929.  .  EKG TWI in anterior lateral leads.  Denies CP, endorses additional SOB.  Trend HST x 2 every 6 hours.  Repeat HST and check EKG if worsening symptomatology.  Check limited echo.  Mechanical Fall: associated with facial injuries.  CT head anterior scalp/forehead contusion and hematoma with right supraorbital and periorbital soft tissue swelling, no globe injury or acute orbital findings, no facial fractures. Home plavix held, hold subQ heparin. Trend H/H. Trauma consulted.   Obstructive CAD: S/p PCI to LAD x 2 5/2024. Follows outpatient with Dr. George, cardiology. On statin and BB. Held plavix secondary to mechanical injury until evaluated by trauma.     Chronic HFmrEF, NYHA III: Likely ICM +/- NICM.  Not exacerbation.  Last ECHO EF 45-50%, no RWMA, mild-moderate TR, RVSP 52 mmHg 5/2024.  proBNP elevated above baseline, with hypervolemia more likely volume overload vs exacerbation.  2 g sodium and 2 L fluid restriction.  Continue home diuretics.  Nephrology following for volume status.  Strict I&O's.  Monitor daily weights.  HTN: Continue Coreg 25 mg p.o. twice daily with holding parameters  HLD: Continue Crestor 10 mg p.o. daily  NIDDMII: Associated with neuropathy.  Last HbA1c 4.8 8/2023.  On no home antidiabetic regimen, possibly diet controlled.  Repeat HgbA1c..  Started on low-dose insulin/scale with Accu-Cheks.  Monitor blood glucose with daily BMP.  Adjust insulin currently.  Hypoglycemic recalls initiated.        ROS (12 point review of systems completed.  Pertinent positives noted. Otherwise ROS is negative).    Medications:  Reviewed    Infusion Medications    dextrose      sodium chloride       Scheduled Medications    neomycin-bacitracin-polymyxin   Topical BID    aspirin EC  81 mg Oral Daily    atorvastatin  40 mg Oral Nightly    carvedilol  25 mg Oral BID with meals    [Held by provider] clopidogrel  75 mg Oral Daily    gabapentin  600 mg Oral Nightly    levothyroxine  175 mcg Oral Daily    pantoprazole  40 mg Oral QAM AC    primidone  100 mg Oral QAM    primidone  50 mg Oral Nightly    rOPINIRole  0.5 mg Oral Nightly    sertraline  100 mg Oral Daily    sevelamer  800 mg Oral TID WC    insulin lispro  0-4 Units SubCUTAneous TID WC    insulin lispro  0-4 Units SubCUTAneous Nightly    sodium chloride flush  5-40 mL IntraVENous 2 times per day    [Held by provider] heparin (porcine)  5,000 Units SubCUTAneous 3 times per day     PRN Meds: albuterol sulfate HFA, ipratropium 0.5 mg-albuterol 2.5 mg, melatonin, glucose, dextrose bolus **OR** dextrose bolus, glucagon (rDNA), dextrose, sodium chloride flush, sodium chloride, polyethylene glycol, acetaminophen **OR** acetaminophen, oxyCODONE-acetaminophen, prochlorperazine      Intake/Output Summary (Last 24 hours) at 9/1/2024 0846  Last data filed at 9/1/2024 0408  Gross per 24 hour   Intake 2520 ml   Output 4650 ml   Net -2130 ml       Diet:  ADULT DIET; Regular; 4 carb choices (60 gm/meal); Low Sodium (2 gm); Low Phosphorus (Less than 1000 mg); 2000 ml    Exam:  BP (!) 165/85   Pulse 83   Temp 98.6 °F (37 °C) (Oral)   Resp 18   Ht 1.854 m (6' 1\")   Wt (!) 148 kg (326 lb 4.5 oz)   SpO2 99%   BMI 43.05 kg/m²     General appearance: No apparent distress, appears stated age and cooperative.  HEENT: Bilateral upper eyelid contusion, hematoma and erythema. Tenderness to palpation, small laceration at bridge of nose, Upper lips laceration  Neck: Supple, with full range

## 2024-09-01 NOTE — CARE COORDINATION
09/01/24 0927   Service Assessment   Patient Orientation Alert and Oriented   Cognition Alert   History Provided By Patient   Primary Caregiver Self   Support Systems Spouse/Significant Other   Patient's Healthcare Decision Maker is: Named in Scanned ACP Document   PCP Verified by CM Yes   Last Visit to PCP Within last year   Prior Functional Level Assistance with the following:;Housework;Shopping;Cooking;Mobility;Bathing   Current Functional Level Assistance with the following:;Bathing;Cooking;Housework;Shopping;Mobility;Toileting   Can patient return to prior living arrangement Yes   Ability to make needs known: Good   Family able to assist with home care needs: Yes   Would you like for me to discuss the discharge plan with any other family members/significant others, and if so, who? No   Financial Resources Medicare   Community Resources None   Social/Functional History   Active  No   Patient's  Info wife   Discharge Planning   Type of Residence House   Living Arrangements Spouse/Significant Other   Current Services Prior To Admission Durable Medical Equipment;Oxygen Therapy;Home Bipap  (O2 4L ATC. SR DME)   Current DME Prior to Arrival Cane   Potential Assistance Needed N/A   DME Ordered? No   Potential Assistance Purchasing Medications No   Type of Home Care Services None   Patient expects to be discharged to: House   Services At/After Discharge   Transition of Care Consult (CM Consult) Discharge Planning   Services At/After Discharge None   Mode of Transport at Discharge Other (see comment)  (wife)   Confirm Follow Up Transport Family   Condition of Participation: Discharge Planning   The Plan for Transition of Care is related to the following treatment goals: get dialysis     Patient Goals/Plan/Treatment Preferences: Spoke with Farhad, plans to return home with his wife. Home HD machine is not working. Has dme/02 as listed above.     If patient is discharged prior to next notation, then this  note serves as note for discharge by case management.

## 2024-09-01 NOTE — FLOWSHEET NOTE
08/31/24 2030   Vital Signs   BP (!) 193/100   Temp 96.8 °F (36 °C)   Pulse 57   Respirations 15   SpO2 99 %   Post-Hemodialysis Assessment   Post-Treatment Procedures Blood returned;Access bleeding time < 10 minutes   Machine Disinfection Process Acid/Vinegar Clean;Heat Disinfect;Exterior Machine Disinfection   Blood Volume Processed (Liters) 69.8 L   Dialyzer Clearance Heavily streaked   Duration of Treatment (minutes) 210 minutes   Heparin Amount Administered During Treatment (mL) 2000 mL   Hemodialysis Intake (ml) 400 ml   Hemodialysis Output (ml) 4400 ml   NET Removed (ml) 4000     Stable 3.5 hour TX completed. Difficult cannulation, pulled clots. Dr. Em aware. Removed 4 L of fluid, tolerated fluid removal well. Pressure held to each needle site x 10 min. Drsg clean, dry, and intact upon leaving unit. TX record printed for scanning into EMR. Report given to primary RN.

## 2024-09-01 NOTE — SIGNIFICANT EVENT
Post-Fall Assessment  Date of Fall:   8/31  Time of Fall:   2257   Yes No N/A Comment   Was Patient on [place] Program? [x] [] []    Was the Fall Witnessed? [] [x] []    Were Clothes a Factor? [] [x] []    Was Patient Wearing Corrective Footwear? [] [x] []    Other Environmental Factors Involved? [] [x] []      Description of Fall  Who found the patient: Andrea MATUTE  Where was the patient at the time of the fall:  Bathroom Floor  Brief description of fall: Patient was found down on bathroom floor  Patient comments regarding fall:   \"I was just trying to open my gown to sit down on the toilet  Medications potentially contributing to fall risk (such as Sedatives, Hypnotics, Antihypertensives, Narcotics, Psychotropics, Anticonvulsants):   None, no medications administered prior to fall    Patient Assessment of Injury    (Please document Vital Signs in Doc Flowsheet)     Yes No   Patient hit his/her head [x] []   Patient is taking an anticoagulant [] [x]   CT of Head requested [x] []     Neurological Assessment Protocol:    If the patient has hit his/her head during this fall,   a Neurological Assessment must be completed every 2 hours for 12 hours;   every 3 hours for 24 hours;   then every 4 hours for 24 hours.  Document in Doc Flowsheets.    Neurological Assessment Protocol initiated  yes    If the patient did not hit his/her head during this fall, monitor vital signs every 8 hours.  Notify the physician within 24 hours and document.      Physician Notification  Please document under “Provider Notification” group within the Assessment (Complex Assessment) template of Doc Flowsheets.     Physician notified yes    Pharmacy notified yes Time Notified: 2257    House Supervisor/Clinical Manager Notified:   Luanne MATUTE  Date:   8/31 Time:   22  Family Member Notified:   Will Notify in AM

## 2024-09-01 NOTE — PROGRESS NOTES
Pt admitted to  4K24 from 7a  Complaints: missed dialysis appointment.    IV none infusing into the Right Shoulder at a rate of IV site free of s/s of infection or infiltration. Vital signs obtained. Assessment and data collection initiated. Two nurse skin assessment performed by Andrea MATUTE and HARMONY RN. Oriented to room. Policies and procedures for 4K explained. All questions answered with no further questions at this time. Fall prevention and safety brochure discussed with patient.  Bed alarm on. Call light in reach.

## 2024-09-01 NOTE — H&P
Trauma Consutl      Patient:  Farhad Myles  Admit date: 8/31/2024   YOB: 1960 Date of Evaluation: 9/1/2024  MRN: [id number]  Acct: [id number]    Injury Date:8/30/24  Injury time:mid day   PCP: Dulce Maria Crespo MD   Referring physician: Oliver    Time of Trauma Surgeon Notification:  9/1/24 @ 1015  Time of KARY Arrival: na  Time of Trauma Surgeon Arrival:  9/1/24@ 11:55     Assessment:    Fall from standing  Periorbitial hematoma  Flank hematoma    Plan:    No intervention needed by trauma  Ok to resuem plavix in 24 hours   Ice     Activation: []Level I ([place] Alert) []Level II (Injury Call) [x]Level III (Trauma Consult)  Mode of Arrival:  In house  Referring Facility: na  Loss of Consciousness [x]No []Yes[]Unknown   Duration(min)  Mechanism of Injury:  []Motor Vehicle crash   []Single Vehicle [] []Passenger []Scene Fatality []Front Seat  []Restrained   []Air Bag Deployed   []Ejected []Rollover []Pedestrian []Trapped   Type of vehicle:   Protective Devices:   []Motorcycle  Wearing Helmet []Yes []No  []Bicycle  Wearing Helmet []Yes []No  [x]Fall   Distance - ground level    []Assault    Abuse Reported []Yes []No  []Gunshot  []Stabbing  []Work Related  []Burn: []Flame []Scald []Electrical []Chemical []Contact []Inhalation []House Fire  []Other:   Patient Active Problem List   Diagnosis    Allergy to bee sting    Essential hypertension    Rheumatoid arteritis (HCC)    Hypothyroidism    Obstructive sleep apnea    Small intestinal bacterial overgrowth    Generalized abdominal pain    Nausea and vomiting    Hepatomegaly    S/P partial resection of colon    S/P laparotomy    Acute renal failure superimposed on stage 5 chronic kidney disease, not on chronic dialysis (HCC)    Hypernatremia    Serum potassium decreased    Other headache syndrome    RLS (restless legs syndrome)    Psychophysiological insomnia    Angina, class III (HCC)    Dyspnea    CHF (NYHA class III, ACC/AHA stage C) (HCC)    CAROLE  iterative    reconstructions, and/or weight-based dosing   when appropriate to reduce radiation to a low as reasonably achievable.      CT ABDOMEN PELVIS WO CONTRAST Additional Contrast? None   Final Result   Partially organized and intermediate density fluid collection in the right    flank and right lateral abdominal wall extending into the lower right    chest wall superficial soft tissues, slightly increased from 08/31/2024    exam. Most of this likely represents pooling edema in the setting of    severe diffuse anasarca, however there is probably a small hemorrhagic    hematoma component at the areas of increased density. No adjacent    fracture.   2. No acute traumatic finding in the abdomen or pelvis otherwise.    Remaining findings are similar to 08/31/2024 exam.      This document has been electronically signed by: Félix Keenan MD on    09/01/2024 01:04 AM      All CTs at this facility use dose modulation techniques and iterative    reconstructions, and/or weight-based dosing   when appropriate to reduce radiation to a low as reasonably achievable.      CT CHEST WO CONTRAST   Final Result   1. No chest wall fracture.   2. Cardiomegaly with findings of volume overload and fluid third-spacing    similar to the previous exam.   3. New organized intermediate density fluid collection measuring 17 cm    maximum axial dimension in the right lateral chest wall extending    inferiorly into the right lateral flank and upper abdomen. This likely    represents some pooling edema related to fluid third-spacing with    suspected acute hematoma component.      This document has been electronically signed by: Félix Keenan MD on    09/01/2024 12:59 AM      All CTs at this facility use dose modulation techniques and iterative    reconstructions, and/or weight-based dosing   when appropriate to reduce radiation to a low as reasonably achievable.      CT THORACIC RECONSTRUCTION WO POST PROCESS   Final Result   1. No acute

## 2024-09-01 NOTE — SIGNIFICANT EVENT
2257 Pt found down on bathroom floor. Two bleeding lacerations on forehead found    2257 Rapid response called    2258 Resource nurse Mitali RN at bedside, House Supervisor Luanne RN at bedside    2259 Vitals obtained 197/102 BP, 83 HR, 94 O2, Dr. Negrete at bedside    2304 Provider placed orders for STAT CT Head, Cervical spine, Thoracic Spine.    2309 C- Collar Applied    2312 pt carried out of bathroom via flat sheet with Andrea MATUTE, Libby MATUTE, Vishal RN, Aline MATUTE, Dr. Negrete, [facility]    2314 pt transferred to board + straps applied    2317 pt transferred to bed    2322 Vitals Obtained 179/101 BP, 74 HR, 92 O2    2324 pt transported to CT via Andrea RN, Resource Nurse Mitali RN, House Supervisor Luanne MATUTE, Libby MATUTE, John Arango NP.

## 2024-09-01 NOTE — PROGRESS NOTES
RRT's dismissed from Rapid Response at this time. No respiratory interventions needed at this time.

## 2024-09-02 ENCOUNTER — APPOINTMENT (OUTPATIENT)
Age: 64
End: 2024-09-02
Attending: STUDENT IN AN ORGANIZED HEALTH CARE EDUCATION/TRAINING PROGRAM
Payer: MEDICARE

## 2024-09-02 DIAGNOSIS — N40.1 BENIGN PROSTATIC HYPERPLASIA WITH URINARY FREQUENCY: ICD-10-CM

## 2024-09-02 DIAGNOSIS — R35.0 BENIGN PROSTATIC HYPERPLASIA WITH URINARY FREQUENCY: ICD-10-CM

## 2024-09-02 LAB
ANION GAP SERPL CALC-SCNC: 14 MEQ/L (ref 8–16)
BUN SERPL-MCNC: 50 MG/DL (ref 7–22)
CALCIUM SERPL-MCNC: 8.4 MG/DL (ref 8.5–10.5)
CHLORIDE SERPL-SCNC: 103 MEQ/L (ref 98–111)
CO2 SERPL-SCNC: 23 MEQ/L (ref 23–33)
CREAT SERPL-MCNC: 5.3 MG/DL (ref 0.4–1.2)
DEPRECATED RDW RBC AUTO: 53.4 FL (ref 35–45)
ERYTHROCYTE [DISTWIDTH] IN BLOOD BY AUTOMATED COUNT: 15.5 % (ref 11.5–14.5)
GFR SERPL CREATININE-BSD FRML MDRD: 12 ML/MIN/1.73M2
GLUCOSE BLD STRIP.AUTO-MCNC: 124 MG/DL (ref 70–108)
GLUCOSE BLD STRIP.AUTO-MCNC: 71 MG/DL (ref 70–108)
GLUCOSE BLD STRIP.AUTO-MCNC: 97 MG/DL (ref 70–108)
GLUCOSE SERPL-MCNC: 86 MG/DL (ref 70–108)
HCT VFR BLD AUTO: 31 % (ref 42–52)
HGB BLD-MCNC: 9.7 GM/DL (ref 14–18)
MAGNESIUM SERPL-MCNC: 2.2 MG/DL (ref 1.6–2.4)
MCH RBC QN AUTO: 29.6 PG (ref 26–33)
MCHC RBC AUTO-ENTMCNC: 31.3 GM/DL (ref 32.2–35.5)
MCV RBC AUTO: 94.5 FL (ref 80–94)
PLATELET # BLD AUTO: 142 THOU/MM3 (ref 130–400)
PMV BLD AUTO: 10.3 FL (ref 9.4–12.4)
POTASSIUM SERPL-SCNC: 3.5 MEQ/L (ref 3.5–5.2)
POTASSIUM SERPL-SCNC: 3.5 MEQ/L (ref 3.5–5.2)
RBC # BLD AUTO: 3.28 MILL/MM3 (ref 4.7–6.1)
SODIUM SERPL-SCNC: 140 MEQ/L (ref 135–145)
WBC # BLD AUTO: 4.6 THOU/MM3 (ref 4.8–10.8)

## 2024-09-02 PROCEDURE — 6360000002 HC RX W HCPCS: Performed by: STUDENT IN AN ORGANIZED HEALTH CARE EDUCATION/TRAINING PROGRAM

## 2024-09-02 PROCEDURE — 85027 COMPLETE CBC AUTOMATED: CPT

## 2024-09-02 PROCEDURE — 2580000003 HC RX 258: Performed by: STUDENT IN AN ORGANIZED HEALTH CARE EDUCATION/TRAINING PROGRAM

## 2024-09-02 PROCEDURE — 90935 HEMODIALYSIS ONE EVALUATION: CPT

## 2024-09-02 PROCEDURE — 82948 REAGENT STRIP/BLOOD GLUCOSE: CPT

## 2024-09-02 PROCEDURE — 6360000002 HC RX W HCPCS

## 2024-09-02 PROCEDURE — 6370000000 HC RX 637 (ALT 250 FOR IP): Performed by: STUDENT IN AN ORGANIZED HEALTH CARE EDUCATION/TRAINING PROGRAM

## 2024-09-02 PROCEDURE — 83735 ASSAY OF MAGNESIUM: CPT

## 2024-09-02 PROCEDURE — 80048 BASIC METABOLIC PNL TOTAL CA: CPT

## 2024-09-02 PROCEDURE — 1200000003 HC TELEMETRY R&B

## 2024-09-02 PROCEDURE — 99233 SBSQ HOSP IP/OBS HIGH 50: CPT | Performed by: INTERNAL MEDICINE

## 2024-09-02 PROCEDURE — 36415 COLL VENOUS BLD VENIPUNCTURE: CPT

## 2024-09-02 PROCEDURE — 90935 HEMODIALYSIS ONE EVALUATION: CPT | Performed by: INTERNAL MEDICINE

## 2024-09-02 PROCEDURE — 6370000000 HC RX 637 (ALT 250 FOR IP)

## 2024-09-02 RX ADMIN — ATORVASTATIN CALCIUM 40 MG: 40 TABLET, FILM COATED ORAL at 20:30

## 2024-09-02 RX ADMIN — SERTRALINE 100 MG: 100 TABLET, FILM COATED ORAL at 13:34

## 2024-09-02 RX ADMIN — OXYCODONE AND ACETAMINOPHEN 1 TABLET: 10; 325 TABLET ORAL at 21:34

## 2024-09-02 RX ADMIN — CARVEDILOL 25 MG: 25 TABLET, FILM COATED ORAL at 19:27

## 2024-09-02 RX ADMIN — BACITRACIN ZINC NEOMYCIN SULFATE POLYMYXIN B SULFATE: 400; 3.5; 5 OINTMENT TOPICAL at 13:34

## 2024-09-02 RX ADMIN — PRIMIDONE 100 MG: 50 TABLET ORAL at 13:34

## 2024-09-02 RX ADMIN — SEVELAMER CARBONATE 800 MG: 800 TABLET, FILM COATED ORAL at 13:34

## 2024-09-02 RX ADMIN — SODIUM CHLORIDE, PRESERVATIVE FREE 10 ML: 5 INJECTION INTRAVENOUS at 13:36

## 2024-09-02 RX ADMIN — GABAPENTIN 600 MG: 300 CAPSULE ORAL at 20:30

## 2024-09-02 RX ADMIN — BACITRACIN ZINC NEOMYCIN SULFATE POLYMYXIN B SULFATE: 400; 3.5; 5 OINTMENT TOPICAL at 20:33

## 2024-09-02 RX ADMIN — ACETAMINOPHEN 650 MG: 325 TABLET ORAL at 08:48

## 2024-09-02 RX ADMIN — CARVEDILOL 25 MG: 25 TABLET, FILM COATED ORAL at 13:34

## 2024-09-02 RX ADMIN — LEVOTHYROXINE SODIUM 175 MCG: 0.15 TABLET ORAL at 06:35

## 2024-09-02 RX ADMIN — PROCHLORPERAZINE EDISYLATE 10 MG: 5 INJECTION INTRAMUSCULAR; INTRAVENOUS at 03:05

## 2024-09-02 RX ADMIN — HYDROMORPHONE HYDROCHLORIDE 0.5 MG: 1 INJECTION, SOLUTION INTRAMUSCULAR; INTRAVENOUS; SUBCUTANEOUS at 03:04

## 2024-09-02 RX ADMIN — PRIMIDONE 50 MG: 50 TABLET ORAL at 20:28

## 2024-09-02 RX ADMIN — SEVELAMER CARBONATE 800 MG: 800 TABLET, FILM COATED ORAL at 18:55

## 2024-09-02 RX ADMIN — OXYCODONE HYDROCHLORIDE AND ACETAMINOPHEN 1 TABLET: 5; 325 TABLET ORAL at 09:42

## 2024-09-02 RX ADMIN — OXYCODONE AND ACETAMINOPHEN 1 TABLET: 10; 325 TABLET ORAL at 13:34

## 2024-09-02 RX ADMIN — HYDROMORPHONE HYDROCHLORIDE 0.5 MG: 1 INJECTION, SOLUTION INTRAMUSCULAR; INTRAVENOUS; SUBCUTANEOUS at 19:04

## 2024-09-02 RX ADMIN — SODIUM CHLORIDE, PRESERVATIVE FREE 10 ML: 5 INJECTION INTRAVENOUS at 20:33

## 2024-09-02 RX ADMIN — ROPINIROLE HYDROCHLORIDE 0.5 MG: 0.5 TABLET, FILM COATED ORAL at 20:30

## 2024-09-02 RX ADMIN — PANTOPRAZOLE SODIUM 40 MG: 40 TABLET, DELAYED RELEASE ORAL at 06:35

## 2024-09-02 ASSESSMENT — PAIN SCALES - GENERAL
PAINLEVEL_OUTOF10: 6
PAINLEVEL_OUTOF10: 5
PAINLEVEL_OUTOF10: 9
PAINLEVEL_OUTOF10: 8
PAINLEVEL_OUTOF10: 9
PAINLEVEL_OUTOF10: 9
PAINLEVEL_OUTOF10: 8
PAINLEVEL_OUTOF10: 9
PAINLEVEL_OUTOF10: 9

## 2024-09-02 ASSESSMENT — PAIN DESCRIPTION - ORIENTATION
ORIENTATION: RIGHT;LEFT
ORIENTATION: LEFT
ORIENTATION: RIGHT;LEFT;MID

## 2024-09-02 ASSESSMENT — PAIN DESCRIPTION - LOCATION
LOCATION: FACE
LOCATION: BACK;HEAD;FACE
LOCATION: HEAD
LOCATION: HEAD
LOCATION: FACE

## 2024-09-02 ASSESSMENT — PAIN DESCRIPTION - DESCRIPTORS
DESCRIPTORS: ACHING;SORE
DESCRIPTORS: ACHING
DESCRIPTORS: ACHING
DESCRIPTORS: ACHING;SORE;PRESSURE
DESCRIPTORS: ACHING;THROBBING;SORE

## 2024-09-02 ASSESSMENT — PAIN - FUNCTIONAL ASSESSMENT
PAIN_FUNCTIONAL_ASSESSMENT: ACTIVITIES ARE NOT PREVENTED
PAIN_FUNCTIONAL_ASSESSMENT: PREVENTS OR INTERFERES SOME ACTIVE ACTIVITIES AND ADLS

## 2024-09-02 ASSESSMENT — PAIN DESCRIPTION - PAIN TYPE: TYPE: ACUTE PAIN

## 2024-09-02 NOTE — PROGRESS NOTES
demand iso ESRD. Trop , repeat 111, 109. EKG shows TWI in anterior leads. Pt denies CP, SOB, N/V, jaw pain, lightheadedness. Low suspicion for ACS event. Defer from cardiology consult at this time.   If pt develops CP or angina equivalent, will obtain STAT EKG and repeat troponin     Chronic Conditions (reviewed and stable unless otherwise stated)  Chronic hypoxic respiratory failure with restrictive airway disease: Associated with decreased DLCO.  On baseline 4 L home O2.  Follows outpatient with LIAM Pierre. continue home inhalers.  Monitor respiratory status.  Check CXR and ABG if respiratory status worsens.  HTN: Continue Coreg 25 mg p.o. BID with holding parameters  HLD: Continue Crestor 10 mg p.o. daily  Hypothyroidism: Last TSH 22.140 5/2024.  Continue Synthroid 175 mcg p.o. daily.  Repeat TSH/FT4.  GERD: Continue pantoprazole 20 g p.o. daily  SHRUTHI/MDD: Continue Zoloft 100 mg p.o. daily and venlafaxine 75 mg p.o. daily  Action tremor: Continue primidone 100 mg in a.m. and 50 mg in p.m, and Requip 0.5 mg as instructed. Follows outpatient with Dr. Garcia, neurology.   BPH: Continue tamsulosin 0.4 mg p.o. daily      LDA: []CVC / []PICC / []Midline / []Martinez / []Drains / []Mediport / [x] HD fistula  Antibiotics: None  Steroids: None  Labs (still needed?): [x]Yes / []No  IVF (still needed?): []Yes / [x]No    Level of care: [x]Step Down / []Med-Surg  Bed Status: [x]Inpatient / []Observation  Telemetry: [x]Yes / []No  PT/OT: [x]Yes / []No    DVT Prophylaxis: [] Lovenox / [x] Heparin (Held) / [] SCDs / [] Already on Systemic Anticoagulation / [] None     Expected discharge date:  TBD  Disposition: Home     Code status: Full Code     ===================================================================    Chief Complaint: Diffuse pain  Subjective (past 24 hours): Patient seen and evaluated at bedside. No acute events overnight. Pt reports continued pain in face, saying that  his pain is not well  controlled yet. No further falls reported. Up in dialysis tolerating well. Denies any HA, LH, dizziness, CP, SOB, N/V/D, abdominal pain.      HPI / Hospital Course:  The patient is a 63-year-old male with a PMH of ESRD on HD, DM2, restrictive airway disease, CAD, HTN, HLD, hypothyroidism, GERD, SHRUTHI/MDD, RLS, and BPH presents to  ED for complaints of \"pain all over\" associated with increased anasarca, and palpitations. The patient undergoes HD sessions every Monday and Friday, but endorses having not received dialysis for the past 3 weeks due to an inability to climb stairs at home. He states his symptoms are associated with worsening SOB, and increased needing supplemental oxygen with exertion.     9/1: Overnight the patient was found down on the bathroom floor with 2 bleeding laceration (1 to upper lip and one small to bridge of nose), significant amount of clots around him.  Rapid response was called.  The patient denied any LOC, numbness, tingling, pain, shortness of breath, dizziness, vomiting or diarrhea.  He did endorse pain and tenderness where he hit his head.  He endorsed a mechanical fall secondary to tripping within his gown.  BP in 190s/100s, BG in 80s.  C-collar was placed. CT head negative for acute intracranial process, but demonstrated global cerebral volume loss and chronic microvascular ischemic changes, anterior scalp/forehead contusion and hematoma with right supraorbital and periorbital soft tissue swelling, no globe injury or acute orbital findings, no facial fractures.  CT chest with cardiomegaly and volume overload, third spacing of fluids, new organized intermediate density fluid collection measuring 17 cm in the right lateral chest wall extending inferiorly into the right lateral flank and upper abdomen-likely secondary to third spacing versus possible acute hematoma component.  The latter finding was redemonstrated on CT A/P.  CT cervical and thoracic spine negative for acute fracture or

## 2024-09-02 NOTE — PROGRESS NOTES
[facility]   Nephrology progress note  9/2/2024, 12:55 PM      Pt Name:    Farhad Myles  MRN:     [id number]     YOB: 1960  Admit Date:    8/31/2024  1:17 PM    Chief Complaint: Nephrology following for ESRD and dialysis  Subjective:  Patient was seen and examined this morning  Seen on dialysis  Tolerating hemodialysis treatment well  Ultrafiltration 4.4 kg      Objective:  24HR INTAKE/OUTPUT:    Intake/Output Summary (Last 24 hours) at 9/2/2024 1255  Last data filed at 9/2/2024 0400  Gross per 24 hour   Intake 370 ml   Output 900 ml   Net -530 ml         I/O last 3 completed shifts:  In: 3130 [P.O.:720; I.V.:2010]  Out: 5550 [Urine:1150]  No intake/output data recorded.   Admission weight: (!) 148 kg (326 lb 4.5 oz)    Wt Readings from Last 3 Encounters:   09/02/24 (!) 140.1 kg (308 lb 13.8 oz)   08/26/24 (!) 145.8 kg (321 lb 6.4 oz)   08/21/24 (!) 142.4 kg (314 lb)        Vitals :   Vitals:    09/02/24 0304 09/02/24 0320 09/02/24 0745 09/02/24 0800   BP:  (!) 180/85 (!) 158/98    Pulse:  58 58    Resp: 18 18 12    Temp:  98 °F (36.7 °C) 98 °F (36.7 °C) 98 °F (36.7 °C)   TempSrc:  Oral Oral    SpO2:  95% 95%    Weight:   (!) 140.1 kg (308 lb 13.8 oz)    Height:           Physical examination  General Appearance: + Prominent bruise, periorbital ecchymosis  Mouth/Throat: Oral mucosa moist  Neck: No JVD  Lungs: no use of accessory muscles  GI: soft, non-tender, no guarding  Extremities: Bilateral 2+ LE edema    Medications:  Infusion:    dextrose      sodium chloride       Meds:    neomycin-bacitracin-polymyxin   Topical BID    [Held by provider] aspirin EC  81 mg Oral Daily    atorvastatin  40 mg Oral Nightly    carvedilol  25 mg Oral BID with meals    [Held by provider] clopidogrel  75 mg Oral Daily    gabapentin  600 mg Oral Nightly    levothyroxine  175 mcg Oral Daily    pantoprazole  40 mg Oral QAM AC    primidone  100 mg Oral QAM    primidone  50 mg Oral Nightly     dialysis staff on 08/30 and have requested to move his equipment to first-floor as soon as possible.  Patient is unable to get upstairs due to knee pain.  Currently his dialysis equipment is on the second floor.  Volume overload.  Continue ultrafiltration with dialysis treatments.  4 L of fluid removed last treatment.  Removing another 4 L today.  Plan another round of dialysis treatment tomorrow with goal of 3.5 L fluid removal  Fall: Surgery following  Hypokalemia.  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 It may contain minor errors which are inherent in voice recognition technology

## 2024-09-02 NOTE — PLAN OF CARE
Problem: Discharge Planning  Goal: Discharge to home or other facility with appropriate resources  9/1/2024 2107 by Andrea Santacruz RN  Outcome: Progressing  Flowsheets (Taken 9/1/2024 2107)  Discharge to home or other facility with appropriate resources:   Identify barriers to discharge with patient and caregiver   Identify discharge learning needs (meds, wound care, etc)     Problem: Pain  Goal: Verbalizes/displays adequate comfort level or baseline comfort level  9/1/2024 2107 by Andrea Santacruz RN  Outcome: Progressing  Flowsheets (Taken 9/1/2024 2107)  Verbalizes/displays adequate comfort level or baseline comfort level:   Encourage patient to monitor pain and request assistance   Administer analgesics based on type and severity of pain and evaluate response     Problem: Safety - Adult  Goal: Free from fall injury  9/1/2024 2107 by Andrea Santacruz RN  Outcome: Progressing  Flowsheets (Taken 9/1/2024 2107)  Free From Fall Injury: Instruct family/caregiver on patient safety     Problem: Nutrition Deficit:  Goal: Optimize nutritional status  9/1/2024 2107 by Andrea Santacruz RN  Outcome: Progressing  Flowsheets (Taken 9/1/2024 2107)  Nutrient intake appropriate for improving, restoring, or maintaining nutritional needs:   Assess nutritional status and recommend course of action   Order, calculate, and assess calorie counts as needed   Monitor oral intake, labs, and treatment plans     Problem: Skin/Tissue Integrity  Goal: Absence of new skin breakdown  Description: 1.  Monitor for areas of redness and/or skin breakdown  2.  Assess vascular access sites hourly  3.  Every 4-6 hours minimum:  Change oxygen saturation probe site  4.  Every 4-6 hours:  If on nasal continuous positive airway pressure, respiratory therapy assess nares and determine need for appliance change or resting period.  9/1/2024 2107 by Andrea Santacruz RN  Outcome: Progressing  Note: Pt will remain free of skin breakdown     Problem: Chronic  Conditions and Co-morbidities  Goal: Patient's chronic conditions and co-morbidity symptoms are monitored and maintained or improved  9/1/2024 2107 by Andrea Santacruz RN  Outcome: Progressing  Flowsheets (Taken 9/1/2024 2107)  Care Plan - Patient's Chronic Conditions and Co-Morbidity Symptoms are Monitored and Maintained or Improved:   Monitor and assess patient's chronic conditions and comorbid symptoms for stability, deterioration, or improvement   Collaborate with multidisciplinary team to address chronic and comorbid conditions and prevent exacerbation or deterioration     Problem: Discharge Planning  Goal: Discharge to home or other facility with appropriate resources  Recent Flowsheet Documentation  Taken 9/1/2024 2107 by Andrea Santacruz RN  Discharge to home or other facility with appropriate resources:   Identify barriers to discharge with patient and caregiver   Identify discharge learning needs (meds, wound care, etc)     Problem: Pain  Goal: Verbalizes/displays adequate comfort level or baseline comfort level  Recent Flowsheet Documentation  Taken 9/1/2024 2107 by Andrea Santacruz RN  Verbalizes/displays adequate comfort level or baseline comfort level:   Encourage patient to monitor pain and request assistance   Administer analgesics based on type and severity of pain and evaluate response     Problem: Safety - Adult  Goal: Free from fall injury  Recent Flowsheet Documentation  Taken 9/1/2024 2107 by Andrea Santacruz RN  Free From Fall Injury: Instruct family/caregiver on patient safety     Problem: Nutrition Deficit:  Goal: Optimize nutritional status  Recent Flowsheet Documentation  Taken 9/1/2024 2107 by Andrea Santacruz RN  Nutrient intake appropriate for improving, restoring, or maintaining nutritional needs:   Assess nutritional status and recommend course of action   Order, calculate, and assess calorie counts as needed   Monitor oral intake, labs, and treatment plans

## 2024-09-02 NOTE — FLOWSHEET NOTE
09/02/24 0745 09/02/24 1219   Vital Signs   BP (!) 158/98 (!) 163/105   Temp 98 °F (36.7 °C) 98 °F (36.7 °C)   Pulse 58 61   Respirations 12 10   SpO2 95 % 95 %   Weight - Scale (!) 140.1 kg (308 lb 13.8 oz) (!) 136.1 kg (300 lb 0.7 oz)   Weight Method Bed scale Bed scale   Percent Weight Change -0.64 -2.86   Post-Hemodialysis Assessment   Post-Treatment Procedures  --  Blood returned;Access bleeding time < 10 minutes   Machine Disinfection Process  --  Acid/Vinegar Clean;Heat Disinfect;Exterior Machine Disinfection   Blood Volume Processed (Liters)  --  77 L   Dialyzer Clearance  --  Lightly streaked   Duration of Treatment (minutes)  --  210 minutes   Hemodialysis Intake (ml)  --  400 ml   Hemodialysis Output (ml)  --  4400 ml   NET Removed (ml)  --  4000   Tolerated Treatment  --  Good     3.5 hr treatment complete.  4 L net UF removed. Tolerated treatment well. Pressure held to needle sites times ten minutes each. Dressing clean, dry and intact. Report given to primary RN. Treatment record printed for scanning into EMR.

## 2024-09-03 ENCOUNTER — APPOINTMENT (OUTPATIENT)
Age: 64
End: 2024-09-03
Attending: STUDENT IN AN ORGANIZED HEALTH CARE EDUCATION/TRAINING PROGRAM
Payer: MEDICARE

## 2024-09-03 LAB
ANION GAP SERPL CALC-SCNC: 13 MEQ/L (ref 8–16)
BUN SERPL-MCNC: 32 MG/DL (ref 7–22)
CA-I BLD ISE-SCNC: 1.14 MMOL/L (ref 1.12–1.32)
CALCIUM SERPL-MCNC: 8.3 MG/DL (ref 8.5–10.5)
CHLORIDE SERPL-SCNC: 101 MEQ/L (ref 98–111)
CO2 SERPL-SCNC: 25 MEQ/L (ref 23–33)
CREAT SERPL-MCNC: 4.2 MG/DL (ref 0.4–1.2)
DEPRECATED RDW RBC AUTO: 53.6 FL (ref 35–45)
ERYTHROCYTE [DISTWIDTH] IN BLOOD BY AUTOMATED COUNT: 15.5 % (ref 11.5–14.5)
GFR SERPL CREATININE-BSD FRML MDRD: 15 ML/MIN/1.73M2
GLUCOSE BLD STRIP.AUTO-MCNC: 113 MG/DL (ref 70–108)
GLUCOSE BLD STRIP.AUTO-MCNC: 87 MG/DL (ref 70–108)
GLUCOSE BLD STRIP.AUTO-MCNC: 88 MG/DL (ref 70–108)
GLUCOSE SERPL-MCNC: 80 MG/DL (ref 70–108)
HCT VFR BLD AUTO: 29.8 % (ref 42–52)
HGB BLD-MCNC: 9.3 GM/DL (ref 14–18)
MAGNESIUM SERPL-MCNC: 2 MG/DL (ref 1.6–2.4)
MCH RBC QN AUTO: 29.5 PG (ref 26–33)
MCHC RBC AUTO-ENTMCNC: 31.2 GM/DL (ref 32.2–35.5)
MCV RBC AUTO: 94.6 FL (ref 80–94)
PLATELET # BLD AUTO: 137 THOU/MM3 (ref 130–400)
PMV BLD AUTO: 10.2 FL (ref 9.4–12.4)
POTASSIUM SERPL-SCNC: 3.5 MEQ/L (ref 3.5–5.2)
RBC # BLD AUTO: 3.15 MILL/MM3 (ref 4.7–6.1)
SODIUM SERPL-SCNC: 139 MEQ/L (ref 135–145)
WBC # BLD AUTO: 5.1 THOU/MM3 (ref 4.8–10.8)

## 2024-09-03 PROCEDURE — 93308 TTE F-UP OR LMTD: CPT

## 2024-09-03 PROCEDURE — 83735 ASSAY OF MAGNESIUM: CPT

## 2024-09-03 PROCEDURE — 6360000002 HC RX W HCPCS

## 2024-09-03 PROCEDURE — 80048 BASIC METABOLIC PNL TOTAL CA: CPT

## 2024-09-03 PROCEDURE — 99232 SBSQ HOSP IP/OBS MODERATE 35: CPT | Performed by: INTERNAL MEDICINE

## 2024-09-03 PROCEDURE — 6370000000 HC RX 637 (ALT 250 FOR IP): Performed by: STUDENT IN AN ORGANIZED HEALTH CARE EDUCATION/TRAINING PROGRAM

## 2024-09-03 PROCEDURE — 82948 REAGENT STRIP/BLOOD GLUCOSE: CPT

## 2024-09-03 PROCEDURE — 99233 SBSQ HOSP IP/OBS HIGH 50: CPT | Performed by: INTERNAL MEDICINE

## 2024-09-03 PROCEDURE — 1200000000 HC SEMI PRIVATE

## 2024-09-03 PROCEDURE — 82330 ASSAY OF CALCIUM: CPT

## 2024-09-03 PROCEDURE — 6370000000 HC RX 637 (ALT 250 FOR IP): Performed by: INTERNAL MEDICINE

## 2024-09-03 PROCEDURE — 36415 COLL VENOUS BLD VENIPUNCTURE: CPT

## 2024-09-03 PROCEDURE — 6360000002 HC RX W HCPCS: Performed by: INTERNAL MEDICINE

## 2024-09-03 PROCEDURE — 6360000002 HC RX W HCPCS: Performed by: STUDENT IN AN ORGANIZED HEALTH CARE EDUCATION/TRAINING PROGRAM

## 2024-09-03 PROCEDURE — 2580000003 HC RX 258: Performed by: STUDENT IN AN ORGANIZED HEALTH CARE EDUCATION/TRAINING PROGRAM

## 2024-09-03 PROCEDURE — 1200000003 HC TELEMETRY R&B

## 2024-09-03 PROCEDURE — 85027 COMPLETE CBC AUTOMATED: CPT

## 2024-09-03 PROCEDURE — 90935 HEMODIALYSIS ONE EVALUATION: CPT

## 2024-09-03 PROCEDURE — 6370000000 HC RX 637 (ALT 250 FOR IP)

## 2024-09-03 RX ORDER — HYDRALAZINE HYDROCHLORIDE 20 MG/ML
5 INJECTION INTRAMUSCULAR; INTRAVENOUS EVERY 6 HOURS PRN
Status: DISCONTINUED | OUTPATIENT
Start: 2024-09-03 | End: 2024-09-06 | Stop reason: HOSPADM

## 2024-09-03 RX ORDER — MORPHINE SULFATE 2 MG/ML
1 INJECTION, SOLUTION INTRAMUSCULAR; INTRAVENOUS EVERY 4 HOURS PRN
Status: DISCONTINUED | OUTPATIENT
Start: 2024-09-03 | End: 2024-09-06 | Stop reason: HOSPADM

## 2024-09-03 RX ORDER — LEVOTHYROXINE SODIUM 100 UG/1
200 TABLET ORAL DAILY
Status: DISCONTINUED | OUTPATIENT
Start: 2024-09-04 | End: 2024-09-06 | Stop reason: HOSPADM

## 2024-09-03 RX ORDER — AMLODIPINE BESYLATE 10 MG/1
10 TABLET ORAL DAILY
Status: DISCONTINUED | OUTPATIENT
Start: 2024-09-03 | End: 2024-09-06 | Stop reason: HOSPADM

## 2024-09-03 RX ADMIN — GABAPENTIN 600 MG: 300 CAPSULE ORAL at 21:48

## 2024-09-03 RX ADMIN — SEVELAMER CARBONATE 800 MG: 800 TABLET, FILM COATED ORAL at 17:07

## 2024-09-03 RX ADMIN — MORPHINE SULFATE 1 MG: 2 INJECTION, SOLUTION INTRAMUSCULAR; INTRAVENOUS at 19:02

## 2024-09-03 RX ADMIN — SEVELAMER CARBONATE 800 MG: 800 TABLET, FILM COATED ORAL at 12:36

## 2024-09-03 RX ADMIN — PRIMIDONE 100 MG: 50 TABLET ORAL at 12:36

## 2024-09-03 RX ADMIN — CARVEDILOL 25 MG: 25 TABLET, FILM COATED ORAL at 16:03

## 2024-09-03 RX ADMIN — OXYCODONE HYDROCHLORIDE AND ACETAMINOPHEN 2 TABLET: 5; 325 TABLET ORAL at 12:38

## 2024-09-03 RX ADMIN — HYDRALAZINE HYDROCHLORIDE 5 MG: 20 INJECTION, SOLUTION INTRAMUSCULAR; INTRAVENOUS at 12:35

## 2024-09-03 RX ADMIN — POLYETHYLENE GLYCOL 3350 17 G: 17 POWDER, FOR SOLUTION ORAL at 19:02

## 2024-09-03 RX ADMIN — BACITRACIN ZINC NEOMYCIN SULFATE POLYMYXIN B SULFATE: 400; 3.5; 5 OINTMENT TOPICAL at 21:49

## 2024-09-03 RX ADMIN — HYDROMORPHONE HYDROCHLORIDE 0.5 MG: 1 INJECTION, SOLUTION INTRAMUSCULAR; INTRAVENOUS; SUBCUTANEOUS at 00:00

## 2024-09-03 RX ADMIN — AMLODIPINE BESYLATE 10 MG: 10 TABLET ORAL at 17:49

## 2024-09-03 RX ADMIN — HYDROMORPHONE HYDROCHLORIDE 0.5 MG: 1 INJECTION, SOLUTION INTRAMUSCULAR; INTRAVENOUS; SUBCUTANEOUS at 09:19

## 2024-09-03 RX ADMIN — ROPINIROLE HYDROCHLORIDE 0.5 MG: 0.5 TABLET, FILM COATED ORAL at 21:48

## 2024-09-03 RX ADMIN — PANTOPRAZOLE SODIUM 40 MG: 40 TABLET, DELAYED RELEASE ORAL at 06:34

## 2024-09-03 RX ADMIN — ATORVASTATIN CALCIUM 40 MG: 40 TABLET, FILM COATED ORAL at 21:48

## 2024-09-03 RX ADMIN — HYDROMORPHONE HYDROCHLORIDE 0.5 MG: 1 INJECTION, SOLUTION INTRAMUSCULAR; INTRAVENOUS; SUBCUTANEOUS at 04:01

## 2024-09-03 RX ADMIN — MORPHINE SULFATE 1 MG: 2 INJECTION, SOLUTION INTRAMUSCULAR; INTRAVENOUS at 14:24

## 2024-09-03 RX ADMIN — OXYCODONE HYDROCHLORIDE AND ACETAMINOPHEN 2 TABLET: 5; 325 TABLET ORAL at 17:07

## 2024-09-03 RX ADMIN — LEVOTHYROXINE SODIUM 175 MCG: 0.15 TABLET ORAL at 06:33

## 2024-09-03 RX ADMIN — HEPARIN SODIUM 5000 UNITS: 5000 INJECTION INTRAVENOUS; SUBCUTANEOUS at 21:56

## 2024-09-03 RX ADMIN — SERTRALINE 100 MG: 100 TABLET, FILM COATED ORAL at 12:36

## 2024-09-03 RX ADMIN — CARVEDILOL 25 MG: 25 TABLET, FILM COATED ORAL at 06:33

## 2024-09-03 RX ADMIN — SODIUM CHLORIDE, PRESERVATIVE FREE 10 ML: 5 INJECTION INTRAVENOUS at 21:50

## 2024-09-03 RX ADMIN — OXYCODONE HYDROCHLORIDE AND ACETAMINOPHEN 2 TABLET: 5; 325 TABLET ORAL at 21:56

## 2024-09-03 RX ADMIN — PRIMIDONE 50 MG: 50 TABLET ORAL at 21:48

## 2024-09-03 ASSESSMENT — PAIN DESCRIPTION - DESCRIPTORS
DESCRIPTORS: ACHING;SORE
DESCRIPTORS: ACHING
DESCRIPTORS: ACHING;SORE
DESCRIPTORS: ACHING

## 2024-09-03 ASSESSMENT — PAIN - FUNCTIONAL ASSESSMENT
PAIN_FUNCTIONAL_ASSESSMENT: ACTIVITIES ARE NOT PREVENTED

## 2024-09-03 ASSESSMENT — PAIN DESCRIPTION - LOCATION
LOCATION: FACE
LOCATION: HEAD
LOCATION: FACE
LOCATION: HEAD;NECK
LOCATION: FACE
LOCATION: HEAD;NECK;BACK
LOCATION: HEAD;NECK;BACK

## 2024-09-03 ASSESSMENT — PAIN SCALES - GENERAL
PAINLEVEL_OUTOF10: 9
PAINLEVEL_OUTOF10: 6
PAINLEVEL_OUTOF10: 0
PAINLEVEL_OUTOF10: 9
PAINLEVEL_OUTOF10: 8
PAINLEVEL_OUTOF10: 7
PAINLEVEL_OUTOF10: 7
PAINLEVEL_OUTOF10: 9
PAINLEVEL_OUTOF10: 8
PAINLEVEL_OUTOF10: 8
PAINLEVEL_OUTOF10: 6
PAINLEVEL_OUTOF10: 8

## 2024-09-03 ASSESSMENT — PAIN DESCRIPTION - PAIN TYPE
TYPE: ACUTE PAIN

## 2024-09-03 ASSESSMENT — PAIN DESCRIPTION - FREQUENCY
FREQUENCY: CONTINUOUS
FREQUENCY: CONTINUOUS
FREQUENCY: INTERMITTENT

## 2024-09-03 ASSESSMENT — PAIN DESCRIPTION - ORIENTATION
ORIENTATION: RIGHT;LEFT
ORIENTATION: UPPER
ORIENTATION: MID
ORIENTATION: RIGHT
ORIENTATION: RIGHT

## 2024-09-03 ASSESSMENT — PAIN DESCRIPTION - ONSET
ONSET: ON-GOING

## 2024-09-03 NOTE — PROGRESS NOTES
Hospitalist Progress Note  Internal Medicine Resident      Patient: Farhad Myles 63 y.o. male      Unit/Bed: 4K-24/024-A    Admit Date: 8/31/2024      ASSESSMENT AND PLAN  Active Problems  ESRD on HD (M/F): Patient presented with volume overload.  Access through left sided AV fistula.  On sevelemer.  Patient reports that had not had home HD in the past 3 weeks, due to pain in his knees preventing him from getting upstairs to his equipment set up.  Received HD 8/31, 9/2, 9/3.  Patient will move his hemodialysis equipment at the bottom floor.  Nephrology following recommendation appreciated.  Continue sevelemer.   Follow with daily BMP.   Plan for discharge on Thursday, before his dialysis at home on Friday.  Mechanical fall: Associated with facial injuries.  CT head showed anterior scalp shows forehead contusion and hematoma with right supraorbital and periorbital soft tissue swelling, no other injury or acute orbital findings, no facial fracture.  Trauma was consulted, recommended no need for surgical intervention  Restarted on home Plavix, holding ASA.  Restarted on heparin subcu for DVT ppx.   Continue Tylenol, home Percocet and IV Dilaudid for pain PRN.   Chronic normocytic anemia: Secondary to renal disease.  Baseline hemoglobin 9-10.  No obvious signs of bleeding.  Monitor H&H with daily CBC.   Transfuse if hemoglobin<7.0 or hemodynamically unstable.   Resolved Problems  Elevated troponins: secondary to ESRD.  Trops 110 -->111 -->109.  Patient denies shortness of breath, chest pain, nausea/vomiting, Zofran, lightheadedness.  Low suspicious of ACS event.  If patient develops chest pain transient improvement, will obtain stat EKG and repeat troponins  Echo results pending.   Chronic Conditions (reviewed and stable unless otherwise stated)  Chronic hypoxic respiratory failure restrictive airway disease: Associated with decreased DLCO.  On baseline 4 L home oxygen.  Follows outpatient with Ignacio

## 2024-09-03 NOTE — PLAN OF CARE
Problem: Discharge Planning  Goal: Discharge to home or other facility with appropriate resources  Recent Flowsheet Documentation  Taken 9/2/2024 2203 by Caio Moser RN  Discharge to home or other facility with appropriate resources:   Identify barriers to discharge with patient and caregiver   Identify discharge learning needs (meds, wound care, etc)     Problem: Discharge Planning  Goal: Discharge to home or other facility with appropriate resources  Flowsheets (Taken 9/2/2024 2203)  Discharge to home or other facility with appropriate resources:   Identify barriers to discharge with patient and caregiver   Identify discharge learning needs (meds, wound care, etc)     Problem: Pain  Goal: Verbalizes/displays adequate comfort level or baseline comfort level  Recent Flowsheet Documentation  Taken 9/2/2024 2203 by Caio Moser RN  Verbalizes/displays adequate comfort level or baseline comfort level:   Encourage patient to monitor pain and request assistance   Administer analgesics based on type and severity of pain and evaluate response   Consider cultural and social influences on pain and pain management   Assess pain using appropriate pain scale   Implement non-pharmacological measures as appropriate and evaluate response     Problem: Pain  Goal: Verbalizes/displays adequate comfort level or baseline comfort level  Flowsheets (Taken 9/2/2024 2203)  Verbalizes/displays adequate comfort level or baseline comfort level:   Encourage patient to monitor pain and request assistance   Administer analgesics based on type and severity of pain and evaluate response   Consider cultural and social influences on pain and pain management   Assess pain using appropriate pain scale   Implement non-pharmacological measures as appropriate and evaluate response     Problem: Safety - Adult  Goal: Free from fall injury  Recent Flowsheet Documentation  Taken 9/2/2024 2203 by Caio Moser RN  Free From Fall Injury:

## 2024-09-03 NOTE — PROGRESS NOTES
[facility]  PHYSICAL THERAPY MISSED TREATMENT NOTE  [facility] ICU STEPDOWN TELEMETRY 4K    Date: 9/3/2024  Patient Name: Farhad Myles        MRN: [id number]   : 1960  (63 y.o.)  Gender: male                REASON FOR MISSED TREATMENT:  Hold treatment per nursing request.  RN reports pt having systolic BP's in 170's since returning from hemodialysis. Will check back tomorrow.    Terri Thurman, MPT [id number]

## 2024-09-03 NOTE — CARE COORDINATION
9/3/24, 8:50 AM EDT    DISCHARGE ON GOING EVALUATION    [facility] day: 3  Location: -24/024-A Reason for admit: Palpitations [R00.2]  Shortness of breath [R06.02]  Admission for dialysis (HCC) [Z99.2]  ESRD (end stage renal disease) (HCC) [N18.6]  Physical deconditioning [R53.81]  NSTEMI (non-ST elevated myocardial infarction) (HCC) [I21.4]  ESRD (end stage renal disease) on dialysis (HCC) [N18.6, Z99.2]  Volume overload [E87.70]  Anasarca associated with disorder of kidney [N04.9]  Other hypervolemia [E87.79]     Procedures:   9/3 Echo: ordered 8/31    Imaging since last note:   8/31 CT head:   1. No mass effect or acute hemorrhage.  2. Mild periventricular small vessel ischemic changes.  8/31 CT abd/pelvis:   1. There is mild ascites present along with a somewhat nodular appearance of the liver contour suggesting possible cirrhosis.  2. There is hepatosplenomegaly noted.  3. Mild retroperitoneal para-aortic lymphadenopathy at the level of the renal  arteries is noted. This is nonspecific.  4. There is diffuse body wall edema likely representing anasarca.  5. There is a small hernia along the lateral right lower abdominal wall on axial image 72 containing a short segment of small bowel. However, no definite evidence of associated obstruction or strangulation is seen.  8/31 Repeat CT head:   1. No acute intracranial abnormality.  2. Global cerebral volume loss and chronic microvascular ischemic changes.  3. Anterior scalp / forehead contusion and hematoma with right   supraorbital and periorbital soft tissue swelling. No globe injury or   other acute orbital finding. No facial fracture.  9/1 CT abd/pelvis:   Partially organized and intermediate density fluid collection in the right   flank and right lateral abdominal wall extending into the lower right   chest wall superficial soft tissues, slightly increased from 08/31/2024   exam. Most of this likely represents pooling edema in the setting

## 2024-09-03 NOTE — PROGRESS NOTES
09/03/24 1319   Encounter Summary   Encounter Overview/Reason Spiritual/Emotional Needs   Service Provided For Patient   Referral/Consult From Rounding   Support System Spouse   Last Encounter  09/03/24   Complexity of Encounter Moderate   Begin Time 1310   End Time  1319   Total Time Calculated 9 min   Spiritual/Emotional needs   Type Spiritual Support   Assessment/Intervention/Outcome   Assessment Coping   Intervention Nurtured Hope;Prayer (assurance of)/[place];Sustaining Presence/Ministry of presence   Outcome Coping     Assessment:  In my encounter with the 63 yr old patient, while rounding  the unit 4K,  I provided spiritual care to patient through conversation, I also came to assess the patient's spiritual needs present.     Interventions:  I provided prayer, emotional support and words of comfort.     Outcomes:  The patient was encouraged and didn't share any further spiritual needs at this time.     Plan:  Chaplains will follow-up at a later time for assessment of any spiritual care needs present.

## 2024-09-03 NOTE — PLAN OF CARE
Problem: Discharge Planning  Goal: Discharge to home or other facility with appropriate resources  9/3/2024 0834 by Nida Sams RN  Outcome: Progressing  9/2/2024 2203 by Caio Moser RN  Flowsheets (Taken 9/2/2024 2203)  Discharge to home or other facility with appropriate resources:   Identify barriers to discharge with patient and caregiver   Identify discharge learning needs (meds, wound care, etc)     Problem: Pain  Goal: Verbalizes/displays adequate comfort level or baseline comfort level  9/3/2024 0834 by Nida Sams RN  Outcome: Progressing  Flowsheets (Taken 9/3/2024 0730)  Verbalizes/displays adequate comfort level or baseline comfort level: Encourage patient to monitor pain and request assistance  9/2/2024 2203 by Caio Moser RN  Flowsheets (Taken 9/2/2024 2203)  Verbalizes/displays adequate comfort level or baseline comfort level:   Encourage patient to monitor pain and request assistance   Administer analgesics based on type and severity of pain and evaluate response   Consider cultural and social influences on pain and pain management   Assess pain using appropriate pain scale   Implement non-pharmacological measures as appropriate and evaluate response     Problem: Safety - Adult  Goal: Free from fall injury  9/3/2024 0834 by Nida Sams RN  Outcome: Progressing  9/2/2024 2203 by Caio Moser RN  Flowsheets (Taken 9/2/2024 2203)  Free From Fall Injury: Instruct family/caregiver on patient safety     Problem: Nutrition Deficit:  Goal: Optimize nutritional status  Outcome: Progressing     Problem: Skin/Tissue Integrity  Goal: Absence of new skin breakdown  Description: 1.  Monitor for areas of redness and/or skin breakdown  2.  Assess vascular access sites hourly  3.  Every 4-6 hours minimum:  Change oxygen saturation probe site  4.  Every 4-6 hours:  If on nasal continuous positive airway pressure, respiratory therapy assess nares and determine need for appliance change or  resting period.  Outcome: Progressing     Problem: Chronic Conditions and Co-morbidities  Goal: Patient's chronic conditions and co-morbidity symptoms are monitored and maintained or improved  Outcome: Progressing     Problem: Metabolic/Fluid and Electrolytes - Adult  Goal: Electrolytes maintained within normal limits  9/3/2024 0834 by Nida Sams RN  Outcome: Progressing  9/2/2024 2205 by Caio Moser RN  Flowsheets (Taken 9/2/2024 2205)  Electrolytes maintained within normal limits:   Monitor labs and assess patient for signs and symptoms of electrolyte imbalances   Monitor response to electrolyte replacements, including repeat lab results as appropriate   Administer electrolyte replacement as ordered   Care plan reviewed with patient.  Patient verbalizes understanding of the care plan and contributed to goal setting.

## 2024-09-03 NOTE — FLOWSHEET NOTE
09/03/24 0745 09/03/24 1121   Vital Signs   BP (!) 195/105 (!) 154/90   Temp 98.1 °F (36.7 °C) 98 °F (36.7 °C)   Pulse 65 63   Respirations 18 20   SpO2 100 %  --    Weight - Scale 133.7 kg (294 lb 12.1 oz) 130.7 kg (288 lb 2.3 oz)   Weight Method Bed scale Bed scale   Percent Weight Change -3.12 -2.24   Post-Hemodialysis Assessment   Post-Treatment Procedures  --  Blood returned;Access bleeding time < 10 minutes   Machine Disinfection Process  --  Acid/Vinegar Clean;Heat Disinfect;Exterior Machine Disinfection   Rinseback Volume (ml)  --  300 ml   Blood Volume Processed (Liters)  --  59.8 L   Dialyzer Clearance  --  Lightly streaked   Duration of Treatment (minutes)  --  180 minutes   Hemodialysis Intake (ml)  --  300 ml   Hemodialysis Output (ml)  --  3314 ml   NET Removed (ml)  --  3014   Tolerated Treatment  --  Good   Observations & Evaluations   Level of Consciousness 0  --    Respiratory Quality/Effort Unlabored  --    O2 Device Nasal cannula  --        Patient completed 3 hours treatment with 3L removed. Patient tolerated treatment well. Both sites held x10 min each with dressings dry and intact. Report given to primary RN

## 2024-09-03 NOTE — PLAN OF CARE
Problem: Pain  Goal: Verbalizes/displays adequate comfort level or baseline comfort level  Flowsheets (Taken 9/2/2024 2203)  Verbalizes/displays adequate comfort level or baseline comfort level:   Encourage patient to monitor pain and request assistance   Administer analgesics based on type and severity of pain and evaluate response   Consider cultural and social influences on pain and pain management   Assess pain using appropriate pain scale   Implement non-pharmacological measures as appropriate and evaluate response     Problem: Safety - Adult  Goal: Free from fall injury  Recent Flowsheet Documentation  Taken 9/2/2024 2203 by Caio Moser RN  Free From Fall Injury: Instruct family/caregiver on patient safety     Problem: Safety - Adult  Goal: Free from fall injury  Flowsheets (Taken 9/2/2024 2203)  Free From Fall Injury: Instruct family/caregiver on patient safety     Problem: Metabolic/Fluid and Electrolytes - Adult  Goal: Electrolytes maintained within normal limits  Flowsheets (Taken 9/2/2024 2205)  Electrolytes maintained within normal limits:   Monitor labs and assess patient for signs and symptoms of electrolyte imbalances   Monitor response to electrolyte replacements, including repeat lab results as appropriate   Administer electrolyte replacement as ordered

## 2024-09-03 NOTE — PROGRESS NOTES
[facility]   Nephrology progress note  9/3/2024, 10:44 AM      Pt Name:    Farhad Myles  MRN:     [id number]     YOB: 1960  Admit Date:    8/31/2024  1:17 PM    Chief Complaint: Nephrology following for ESRD and dialysis  Subjective:  Patient was seen and examined this morning  Feels alright  Awaiting to go to HD   Reports pain is improving  Still has large bruise    Objective:  24HR INTAKE/OUTPUT:    Intake/Output Summary (Last 24 hours) at 9/3/2024 1044  Last data filed at 9/3/2024 0730  Gross per 24 hour   Intake 700 ml   Output 4650 ml   Net -3950 ml         I/O last 3 completed shifts:  In: 800 [P.O.:400]  Out: 5000 [Urine:600]  I/O this shift:  In: 200 [P.O.:200]  Out: -    Admission weight: (!) 148 kg (326 lb 4.5 oz)    Wt Readings from Last 3 Encounters:   09/03/24 133.7 kg (294 lb 12.1 oz)   08/26/24 (!) 145.8 kg (321 lb 6.4 oz)   08/21/24 (!) 142.4 kg (314 lb)        Vitals :   Vitals:    09/03/24 0636 09/03/24 0730 09/03/24 0745 09/03/24 0919   BP: (!) 158/85  (!) 195/105    Pulse: 62 62 65    Resp:  18 18 20   Temp:   98.1 °F (36.7 °C)    TempSrc:       SpO2:  98% 100%    Weight:   133.7 kg (294 lb 12.1 oz)    Height:           Physical examination  General Appearance: + Prominent bruise, periorbital ecchymosis  Mouth/Throat: Oral mucosa moist  Neck: No JVD  Lungs: no use of accessory muscles  GI: soft, non-tender, no guarding  Extremities: Bilateral 2+ LE edema    Medications:  Infusion:    dextrose      sodium chloride       Meds:    neomycin-bacitracin-polymyxin   Topical BID    [Held by provider] aspirin EC  81 mg Oral Daily    atorvastatin  40 mg Oral Nightly    carvedilol  25 mg Oral BID with meals    [Held by provider] clopidogrel  75 mg Oral Daily    gabapentin  600 mg Oral Nightly    levothyroxine  175 mcg Oral Daily    pantoprazole  40 mg Oral QAM AC    primidone  100 mg Oral QAM    primidone  50 mg Oral Nightly    rOPINIRole  0.5 mg Oral Nightly     sertraline  100 mg Oral Daily    sevelamer  800 mg Oral TID WC    insulin lispro  0-4 Units SubCUTAneous TID WC    insulin lispro  0-4 Units SubCUTAneous Nightly    sodium chloride flush  5-40 mL IntraVENous 2 times per day    [Held by provider] heparin (porcine)  5,000 Units SubCUTAneous 3 times per day     Meds prn: hydrALAZINE, oxyCODONE-acetaminophen **OR** oxyCODONE-acetaminophen, HYDROmorphone **OR** HYDROmorphone, albuterol sulfate HFA, ipratropium 0.5 mg-albuterol 2.5 mg, melatonin, glucose, dextrose bolus **OR** dextrose bolus, glucagon (rDNA), dextrose, sodium chloride flush, sodium chloride, polyethylene glycol, acetaminophen **OR** acetaminophen, prochlorperazine     Lab Data :  CBC:   Recent Labs     09/01/24  0439 09/02/24  0454 09/03/24  0331   WBC 6.7 4.6* 5.1   HGB 9.5* 9.7* 9.3*   HCT 30.6* 31.0* 29.8*    142 137     CMP:  Recent Labs     09/01/24  0439 09/02/24  0454 09/03/24  0331    140 139   K 3.4* 3.5  3.5 3.5    103 101   CO2 22* 23 25   BUN 48* 50* 32*   CREATININE 4.9* 5.3* 4.2*   GLUCOSE 98 86 80   CALCIUM 8.4* 8.4* 8.3*   MG 2.1 2.2 2.0     Hepatic:   Recent Labs     08/31/24  1330   AST 9   ALT 8*   BILITOT 0.3   ALKPHOS 79         Assessment and Plan:  ESRD on dialysis (on home hemodialysis)  Doing better.   Plan HD today for fluid removal. Pt in full agreement.   Fistulogram if needed  Volume overload.  Continue ultrafiltration with dialysis treatments. Volume status is improving.   Fall: Surgery following  Hypokalemia.  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 It may contain minor errors which are inherent in voice recognition technology

## 2024-09-04 LAB
ANION GAP SERPL CALC-SCNC: 12 MEQ/L (ref 8–16)
BUN SERPL-MCNC: 29 MG/DL (ref 7–22)
CA-I BLD ISE-SCNC: 1.18 MMOL/L (ref 1.12–1.32)
CALCIUM SERPL-MCNC: 8.5 MG/DL (ref 8.5–10.5)
CHLORIDE SERPL-SCNC: 101 MEQ/L (ref 98–111)
CO2 SERPL-SCNC: 26 MEQ/L (ref 23–33)
CREAT SERPL-MCNC: 3.6 MG/DL (ref 0.4–1.2)
DEPRECATED RDW RBC AUTO: 53.1 FL (ref 35–45)
ECHO AO ASC DIAM: 3.6 CM
ECHO AO ASCENDING AORTA INDEX: 1.43 CM/M2
ECHO AV CUSP MM: 1.7 CM
ECHO BSA: 2.76 M2
ECHO LA DIAMETER INDEX: 1.51 CM/M2
ECHO LA DIAMETER: 3.8 CM
ECHO LV EJECTION FRACTION BIPLANE: 54 % (ref 55–100)
ECHO LV FRACTIONAL SHORTENING: 29 % (ref 28–44)
ECHO LV INTERNAL DIMENSION DIASTOLE INDEX: 2.35 CM/M2
ECHO LV INTERNAL DIMENSION DIASTOLIC: 5.9 CM (ref 4.2–5.9)
ECHO LV INTERNAL DIMENSION SYSTOLIC INDEX: 1.67 CM/M2
ECHO LV INTERNAL DIMENSION SYSTOLIC: 4.2 CM
ECHO LV IVSD: 1.1 CM (ref 0.6–1)
ECHO LV MASS 2D: 322.9 G (ref 88–224)
ECHO LV MASS INDEX 2D: 128.6 G/M2 (ref 49–115)
ECHO LV POSTERIOR WALL DIASTOLIC: 1.4 CM (ref 0.6–1)
ECHO LV RELATIVE WALL THICKNESS RATIO: 0.47
ECHO RV INTERNAL DIMENSION: 3.6 CM
ERYTHROCYTE [DISTWIDTH] IN BLOOD BY AUTOMATED COUNT: 15.3 % (ref 11.5–14.5)
GFR SERPL CREATININE-BSD FRML MDRD: 18 ML/MIN/1.73M2
GLUCOSE BLD STRIP.AUTO-MCNC: 100 MG/DL (ref 70–108)
GLUCOSE BLD STRIP.AUTO-MCNC: 165 MG/DL (ref 70–108)
GLUCOSE BLD STRIP.AUTO-MCNC: 80 MG/DL (ref 70–108)
GLUCOSE SERPL-MCNC: 89 MG/DL (ref 70–108)
HCT VFR BLD AUTO: 30.7 % (ref 42–52)
HGB BLD-MCNC: 9.7 GM/DL (ref 14–18)
MAGNESIUM SERPL-MCNC: 1.9 MG/DL (ref 1.6–2.4)
MCH RBC QN AUTO: 29.8 PG (ref 26–33)
MCHC RBC AUTO-ENTMCNC: 31.6 GM/DL (ref 32.2–35.5)
MCV RBC AUTO: 94.5 FL (ref 80–94)
PLATELET # BLD AUTO: 150 THOU/MM3 (ref 130–400)
PMV BLD AUTO: 9.6 FL (ref 9.4–12.4)
POTASSIUM SERPL-SCNC: 3.7 MEQ/L (ref 3.5–5.2)
RBC # BLD AUTO: 3.25 MILL/MM3 (ref 4.7–6.1)
SODIUM SERPL-SCNC: 139 MEQ/L (ref 135–145)
WBC # BLD AUTO: 4.9 THOU/MM3 (ref 4.8–10.8)

## 2024-09-04 PROCEDURE — 6370000000 HC RX 637 (ALT 250 FOR IP)

## 2024-09-04 PROCEDURE — 82948 REAGENT STRIP/BLOOD GLUCOSE: CPT

## 2024-09-04 PROCEDURE — 90935 HEMODIALYSIS ONE EVALUATION: CPT

## 2024-09-04 PROCEDURE — 93325 DOPPLER ECHO COLOR FLOW MAPG: CPT | Performed by: INTERNAL MEDICINE

## 2024-09-04 PROCEDURE — 36415 COLL VENOUS BLD VENIPUNCTURE: CPT

## 2024-09-04 PROCEDURE — 93321 DOPPLER ECHO F-UP/LMTD STD: CPT | Performed by: INTERNAL MEDICINE

## 2024-09-04 PROCEDURE — 1200000003 HC TELEMETRY R&B

## 2024-09-04 PROCEDURE — 82330 ASSAY OF CALCIUM: CPT

## 2024-09-04 PROCEDURE — 99232 SBSQ HOSP IP/OBS MODERATE 35: CPT | Performed by: NURSE PRACTITIONER

## 2024-09-04 PROCEDURE — 93308 TTE F-UP OR LMTD: CPT | Performed by: INTERNAL MEDICINE

## 2024-09-04 PROCEDURE — 2700000000 HC OXYGEN THERAPY PER DAY

## 2024-09-04 PROCEDURE — 94761 N-INVAS EAR/PLS OXIMETRY MLT: CPT

## 2024-09-04 PROCEDURE — 83735 ASSAY OF MAGNESIUM: CPT

## 2024-09-04 PROCEDURE — 6370000000 HC RX 637 (ALT 250 FOR IP): Performed by: STUDENT IN AN ORGANIZED HEALTH CARE EDUCATION/TRAINING PROGRAM

## 2024-09-04 PROCEDURE — 6360000002 HC RX W HCPCS

## 2024-09-04 PROCEDURE — 2580000003 HC RX 258: Performed by: STUDENT IN AN ORGANIZED HEALTH CARE EDUCATION/TRAINING PROGRAM

## 2024-09-04 PROCEDURE — 6370000000 HC RX 637 (ALT 250 FOR IP): Performed by: INTERNAL MEDICINE

## 2024-09-04 PROCEDURE — 1200000000 HC SEMI PRIVATE

## 2024-09-04 PROCEDURE — 80048 BASIC METABOLIC PNL TOTAL CA: CPT

## 2024-09-04 PROCEDURE — 97530 THERAPEUTIC ACTIVITIES: CPT

## 2024-09-04 PROCEDURE — 97166 OT EVAL MOD COMPLEX 45 MIN: CPT

## 2024-09-04 PROCEDURE — 90935 HEMODIALYSIS ONE EVALUATION: CPT | Performed by: INTERNAL MEDICINE

## 2024-09-04 PROCEDURE — 6360000002 HC RX W HCPCS: Performed by: INTERNAL MEDICINE

## 2024-09-04 PROCEDURE — 85027 COMPLETE CBC AUTOMATED: CPT

## 2024-09-04 RX ADMIN — MORPHINE SULFATE 1 MG: 2 INJECTION, SOLUTION INTRAMUSCULAR; INTRAVENOUS at 21:24

## 2024-09-04 RX ADMIN — SEVELAMER CARBONATE 800 MG: 800 TABLET, FILM COATED ORAL at 12:32

## 2024-09-04 RX ADMIN — SEVELAMER CARBONATE 800 MG: 800 TABLET, FILM COATED ORAL at 16:54

## 2024-09-04 RX ADMIN — MORPHINE SULFATE 1 MG: 2 INJECTION, SOLUTION INTRAMUSCULAR; INTRAVENOUS at 02:40

## 2024-09-04 RX ADMIN — SERTRALINE 100 MG: 100 TABLET, FILM COATED ORAL at 12:40

## 2024-09-04 RX ADMIN — HEPARIN SODIUM 5000 UNITS: 5000 INJECTION INTRAVENOUS; SUBCUTANEOUS at 05:02

## 2024-09-04 RX ADMIN — AMLODIPINE BESYLATE 10 MG: 10 TABLET ORAL at 12:40

## 2024-09-04 RX ADMIN — SODIUM CHLORIDE, PRESERVATIVE FREE 10 ML: 5 INJECTION INTRAVENOUS at 12:41

## 2024-09-04 RX ADMIN — PRIMIDONE 50 MG: 50 TABLET ORAL at 21:23

## 2024-09-04 RX ADMIN — ROPINIROLE HYDROCHLORIDE 0.5 MG: 0.5 TABLET, FILM COATED ORAL at 21:23

## 2024-09-04 RX ADMIN — PRIMIDONE 100 MG: 50 TABLET ORAL at 12:33

## 2024-09-04 RX ADMIN — ATORVASTATIN CALCIUM 40 MG: 40 TABLET, FILM COATED ORAL at 21:23

## 2024-09-04 RX ADMIN — CARVEDILOL 25 MG: 25 TABLET, FILM COATED ORAL at 16:58

## 2024-09-04 RX ADMIN — HEPARIN SODIUM 5000 UNITS: 5000 INJECTION INTRAVENOUS; SUBCUTANEOUS at 13:04

## 2024-09-04 RX ADMIN — PANTOPRAZOLE SODIUM 40 MG: 40 TABLET, DELAYED RELEASE ORAL at 05:02

## 2024-09-04 RX ADMIN — OXYCODONE HYDROCHLORIDE AND ACETAMINOPHEN 2 TABLET: 5; 325 TABLET ORAL at 02:06

## 2024-09-04 RX ADMIN — MORPHINE SULFATE 1 MG: 2 INJECTION, SOLUTION INTRAMUSCULAR; INTRAVENOUS at 16:50

## 2024-09-04 RX ADMIN — BACITRACIN ZINC NEOMYCIN SULFATE POLYMYXIN B SULFATE: 400; 3.5; 5 OINTMENT TOPICAL at 21:26

## 2024-09-04 RX ADMIN — HEPARIN SODIUM 5000 UNITS: 5000 INJECTION INTRAVENOUS; SUBCUTANEOUS at 21:25

## 2024-09-04 RX ADMIN — MORPHINE SULFATE 1 MG: 2 INJECTION, SOLUTION INTRAMUSCULAR; INTRAVENOUS at 11:53

## 2024-09-04 RX ADMIN — BACITRACIN ZINC NEOMYCIN SULFATE POLYMYXIN B SULFATE: 400; 3.5; 5 OINTMENT TOPICAL at 12:31

## 2024-09-04 RX ADMIN — CLOPIDOGREL BISULFATE 75 MG: 75 TABLET ORAL at 12:29

## 2024-09-04 RX ADMIN — Medication 6 MG: at 21:25

## 2024-09-04 RX ADMIN — SODIUM CHLORIDE, PRESERVATIVE FREE 10 ML: 5 INJECTION INTRAVENOUS at 21:22

## 2024-09-04 RX ADMIN — OXYCODONE HYDROCHLORIDE AND ACETAMINOPHEN 2 TABLET: 5; 325 TABLET ORAL at 14:56

## 2024-09-04 RX ADMIN — LEVOTHYROXINE SODIUM 200 MCG: 0.1 TABLET ORAL at 05:02

## 2024-09-04 RX ADMIN — MORPHINE SULFATE 1 MG: 2 INJECTION, SOLUTION INTRAMUSCULAR; INTRAVENOUS at 07:02

## 2024-09-04 RX ADMIN — GABAPENTIN 600 MG: 300 CAPSULE ORAL at 21:23

## 2024-09-04 ASSESSMENT — PAIN DESCRIPTION - ORIENTATION
ORIENTATION: MID
ORIENTATION: MID
ORIENTATION: OTHER (COMMENT)

## 2024-09-04 ASSESSMENT — PAIN DESCRIPTION - DESCRIPTORS
DESCRIPTORS: ACHING;POUNDING
DESCRIPTORS: ACHING;DISCOMFORT;JABBING
DESCRIPTORS: ACHING
DESCRIPTORS: ACHING

## 2024-09-04 ASSESSMENT — PAIN SCALES - GENERAL
PAINLEVEL_OUTOF10: 8
PAINLEVEL_OUTOF10: 9
PAINLEVEL_OUTOF10: 2
PAINLEVEL_OUTOF10: 7
PAINLEVEL_OUTOF10: 8
PAINLEVEL_OUTOF10: 4
PAINLEVEL_OUTOF10: 6
PAINLEVEL_OUTOF10: 8

## 2024-09-04 ASSESSMENT — PAIN DESCRIPTION - FREQUENCY: FREQUENCY: CONTINUOUS

## 2024-09-04 ASSESSMENT — PAIN DESCRIPTION - LOCATION
LOCATION: HEAD
LOCATION: HEAD;FACE
LOCATION: HEAD
LOCATION: FACE

## 2024-09-04 ASSESSMENT — PAIN DESCRIPTION - DIRECTION: RADIATING_TOWARDS: GENERALIZED

## 2024-09-04 ASSESSMENT — PAIN DESCRIPTION - PAIN TYPE: TYPE: ACUTE PAIN

## 2024-09-04 ASSESSMENT — PAIN DESCRIPTION - ONSET: ONSET: ON-GOING

## 2024-09-04 NOTE — PROGRESS NOTES
primidone  100 mg Oral QAM    primidone  50 mg Oral Nightly    rOPINIRole  0.5 mg Oral Nightly    sertraline  100 mg Oral Daily    sevelamer  800 mg Oral TID WC    insulin lispro  0-4 Units SubCUTAneous TID WC    insulin lispro  0-4 Units SubCUTAneous Nightly    sodium chloride flush  5-40 mL IntraVENous 2 times per day    heparin (porcine)  5,000 Units SubCUTAneous 3 times per day     Meds prn: hydrALAZINE, morphine, oxyCODONE-acetaminophen **OR** oxyCODONE-acetaminophen, albuterol sulfate HFA, ipratropium 0.5 mg-albuterol 2.5 mg, melatonin, glucose, dextrose bolus **OR** dextrose bolus, glucagon (rDNA), dextrose, sodium chloride flush, sodium chloride, polyethylene glycol, acetaminophen **OR** acetaminophen, prochlorperazine     Lab Data :  CBC:   Recent Labs     09/02/24  0454 09/03/24  0331 09/04/24  0602   WBC 4.6* 5.1 4.9   HGB 9.7* 9.3* 9.7*   HCT 31.0* 29.8* 30.7*    137 150     CMP:  Recent Labs     09/02/24  0454 09/03/24  0331 09/04/24  0602    139 139   K 3.5  3.5 3.5 3.7    101 101   CO2 23 25 26   BUN 50* 32* 29*   CREATININE 5.3* 4.2* 3.6*   GLUCOSE 86 80 89   CALCIUM 8.4* 8.3* 8.5   MG 2.2 2.0 1.9     Hepatic:   No results for input(s): \"LABALBU\", \"AST\", \"ALT\", \"BILITOT\", \"ALKPHOS\" in the last 72 hours.    Invalid input(s): \"ALB\"        Assessment and Plan:  ESRD on dialysis (on home hemodialysis)  Doing better.   Removing another 4 L of fluid  Plan another round of dialysis for fluid removal tomorrow.  Patient is in agreement  Volume overload/anasarca.  Patient did not do dialysis for 3 weeks.  Came in massively fluid overloaded.  Has had 3 rounds of dialysis treatments each with 4 L of ultrafiltration.  Moving another 4 L today.  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 It may contain minor errors which are

## 2024-09-04 NOTE — RT PROTOCOL NOTE
RT Inhaler-Nebulizer Bronchodilator Protocol Note    There is a bronchodilator order in the chart from a provider indicating to follow the RT Bronchodilator Protocol and there is an “Initiate RT Inhaler-Nebulizer Bronchodilator Protocol” order as well (see protocol at bottom of note).    CXR Findings:  No results found.    The findings from the last RT Protocol Assessment were as follows:   History Pulmonary Disease: None or smoker <15 pack years (restrictive lung disease so does not take inhalers anymore)  Respiratory Pattern: Dyspnea on exertion or RR 21-25 bpm  Breath Sounds: Clear breath sounds  Cough: Strong, productive  Indication for Bronchodilator Therapy: None  Bronchodilator Assessment Score: 3    Aerosolized bronchodilator medication orders have been revised according to the RT Inhaler-Nebulizer Bronchodilator Protocol below.    Respiratory Therapist to perform RT Therapy Protocol Assessment initially then follow the protocol.  Repeat RT Therapy Protocol Assessment PRN for score 0-3 or on second treatment, BID, and PRN for scores above 3.    No Indications - adjust the frequency to every 6 hours PRN wheezing or bronchospasm, if no treatments needed after 48 hours then discontinue using Per Protocol order mode.     If indication present, adjust the RT bronchodilator orders based on the Bronchodilator Assessment Score as indicated below.  Use Inhaler orders unless patient has one or more of the following: on home nebulizer, not able to hold breath for 10 seconds, is not alert and oriented, cannot activate and use MDI correctly, or respiratory rate 25 breaths per minute or more, then use the equivalent nebulizer order(s) with same Frequency and PRN reasons based on the score.  If a patient is on this medication at home then do not decrease Frequency below that used at home.    0-3 - enter or revise RT bronchodilator order(s) to equivalent RT Bronchodilator order with Frequency of every 4 hours PRN for

## 2024-09-04 NOTE — CARE COORDINATION
9/4/24, 2:36 PM EDT    DISCHARGE ON GOING EVALUATION    [facility] day: 4  Location: [facility]26/026-A Reason for admit: Palpitations [R00.2]  Shortness of breath [R06.02]  Admission for dialysis (HCC) [Z99.2]  ESRD (end stage renal disease) ([facility]) [N18.6]  Physical deconditioning [R53.81]  NSTEMI (non-ST elevated myocardial infarction) ([facility]) [I21.4]  ESRD (end stage renal disease) on dialysis (HCC) [N18.6, Z99.2]  Volume overload [E87.70]  Anasarca associated with disorder of kidney [N04.9]  Other hypervolemia [E87.79]     Procedures: 9/3 Echo: ordered 8/31     Imaging since last note: No    Barriers to Discharge: PT/OT to see. HD yesterday and today. Pt reports to attending that his own HD home set up is to be moved downstairs tomorrow so he can access this easier. Likely to discharge tomorrow.     PCP: Dulce Maria Crespo MD  Readmission Risk Score: 24.3    Patient Goals/Plan/Treatment Preferences: Plans home with spouse and continued home HD set up. Equipment to be moved to downstairs area tomorrow.

## 2024-09-04 NOTE — FLOWSHEET NOTE
09/04/24 1157   Vital Signs   BP (!) 165/91   Temp 97.7 °F (36.5 °C)   Pulse 57   Respirations 16   SpO2 100 %   Weight - Scale 131.6 kg (290 lb 2 oz)   Weight Method Bed scale   Percent Weight Change -2.95   Post-Hemodialysis Assessment   Post-Treatment Procedures Blood returned;Access bleeding time < 10 minutes   Machine Disinfection Process Acid/Vinegar Clean;Heat Disinfect;Exterior Machine Disinfection   Rinseback Volume (ml) 400 ml   Blood Volume Processed (Liters) 72.4 L   Dialyzer Clearance Lightly streaked   Duration of Treatment (minutes) 210 minutes   Heparin Amount Administered During Treatment (mL) 4400 mL     Stable 3.5 hour TX completed. Removed 4 L of fluid, tolerated fluid removal well. morphine administered.  Pressure held to each needle site x 10 min. Drsg clean, dry, and intact upon leaving unit. TX record printed for scanning into EMR. Report given to primary RN.

## 2024-09-04 NOTE — PLAN OF CARE
Problem: Discharge Planning  Goal: Discharge to home or other facility with appropriate resources  Outcome: Progressing  Flowsheets (Taken 9/3/2024 2000)  Discharge to home or other facility with appropriate resources: Identify barriers to discharge with patient and caregiver     Problem: Pain  Goal: Verbalizes/displays adequate comfort level or baseline comfort level  Outcome: Progressing     Problem: Safety - Adult  Goal: Free from fall injury  Outcome: Progressing     Problem: Nutrition Deficit:  Goal: Optimize nutritional status  Outcome: Progressing     Problem: Skin/Tissue Integrity  Goal: Absence of new skin breakdown  Description: 1.  Monitor for areas of redness and/or skin breakdown  2.  Assess vascular access sites hourly  3.  Every 4-6 hours minimum:  Change oxygen saturation probe site  4.  Every 4-6 hours:  If on nasal continuous positive airway pressure, respiratory therapy assess nares and determine need for appliance change or resting period.  Outcome: Progressing     Problem: Chronic Conditions and Co-morbidities  Goal: Patient's chronic conditions and co-morbidity symptoms are monitored and maintained or improved  Outcome: Progressing  Flowsheets (Taken 9/3/2024 2000)  Care Plan - Patient's Chronic Conditions and Co-Morbidity Symptoms are Monitored and Maintained or Improved: Monitor and assess patient's chronic conditions and comorbid symptoms for stability, deterioration, or improvement     Problem: Metabolic/Fluid and Electrolytes - Adult  Goal: Electrolytes maintained within normal limits  Outcome: Progressing  Flowsheets (Taken 9/3/2024 2000)  Electrolytes maintained within normal limits: Monitor labs and assess patient for signs and symptoms of electrolyte imbalances

## 2024-09-04 NOTE — PROGRESS NOTES
(36.6 °C) (Oral)   Resp 20   Ht 1.854 m (6' 1\")   Wt 130.7 kg (288 lb 2.3 oz)   SpO2 100%   BMI 38.02 kg/m²     General appearance: No apparent distress, appears stated age and cooperative.  HEENT: Pupils equal, round, and reactive to light. Conjunctivae/corneas clear.  Bruising noted to both eyes with right greater than left  Neck: Supple, with full range of motion. No jugular venous distention. Trachea midline.  Respiratory:  Normal respiratory effort. Clear to auscultation, bilaterally without Rales/Wheezes/Rhonchi.  Cardiovascular: Regular rate and rhythm with normal S1/S2 without murmurs, rubs or gallops.  Abdomen: Soft, non-tender, non-distended with normal bowel sounds.  Musculoskeletal: passive and active ROM x 4 extremities.  Skin: Skin color, texture, turgor normal.    Neurologic:  Neurovascularly intact without any focal sensory/motor deficits. Cranial nerves: II-XII intact, grossly non-focal.  Psychiatric: Alert and oriented x 4, thought content appropriate  Capillary Refill: Brisk,< 3 seconds   Peripheral Pulses: +2 palpable, equal bilaterally       Labs:   Recent Labs     09/02/24  0454 09/03/24  0331 09/04/24  0602   WBC 4.6* 5.1 4.9   HGB 9.7* 9.3* 9.7*   HCT 31.0* 29.8* 30.7*    137 150     Recent Labs     09/02/24  0454 09/03/24  0331 09/04/24  0602    139 139   K 3.5  3.5 3.5 3.7    101 101   CO2 23 25 26   BUN 50* 32* 29*   CREATININE 5.3* 4.2* 3.6*   CALCIUM 8.4* 8.3* 8.5     Microbiology:    MRSA screen is negative    Radiology:  CT LUMBAR RECONSTRUCTION WO POST PROCESS    Result Date: 9/1/2024  CT lumbar spine without contrast Comparison: CT/KO/SR - CT ABDOMEN PELVIS WO CONTRAST - 08/31/2024 02:39 PM EDT CR/SR - XR LUMBAR SPINE (2-3 VIEWS) - 01/17/2023 02:05 PM EST MR/SR - MRI LUMBAR SPINE WO CONTRAST - 11/11/2020 11:06 AM EST Findings: Normal lumbar vertebral body height and alignment. No acute lumbar spine fracture. No suspicious lytic or blastic bone lesion. Diffuse  dialysis ([facility]) [Z99.2] 09/01/2024    Volume overload [E87.70] 08/31/2024    NSTEMI (non-ST elevated myocardial infarction) ([facility]) [I21.4] 08/31/2024       Electronically signed by SAHIL Anaya CNP on 9/4/2024 at 7:40 AM

## 2024-09-05 LAB
ANION GAP SERPL CALC-SCNC: 8 MEQ/L (ref 8–16)
BUN SERPL-MCNC: 23 MG/DL (ref 7–22)
CA-I BLD ISE-SCNC: 1.15 MMOL/L (ref 1.12–1.32)
CALCIUM SERPL-MCNC: 8.2 MG/DL (ref 8.5–10.5)
CHLORIDE SERPL-SCNC: 102 MEQ/L (ref 98–111)
CO2 SERPL-SCNC: 27 MEQ/L (ref 23–33)
CREAT SERPL-MCNC: 3.5 MG/DL (ref 0.4–1.2)
DEPRECATED RDW RBC AUTO: 52.7 FL (ref 35–45)
ERYTHROCYTE [DISTWIDTH] IN BLOOD BY AUTOMATED COUNT: 15.4 % (ref 11.5–14.5)
GFR SERPL CREATININE-BSD FRML MDRD: 19 ML/MIN/1.73M2
GLUCOSE BLD STRIP.AUTO-MCNC: 146 MG/DL (ref 70–108)
GLUCOSE BLD STRIP.AUTO-MCNC: 74 MG/DL (ref 70–108)
GLUCOSE BLD STRIP.AUTO-MCNC: 76 MG/DL (ref 70–108)
GLUCOSE BLD STRIP.AUTO-MCNC: 98 MG/DL (ref 70–108)
GLUCOSE SERPL-MCNC: 84 MG/DL (ref 70–108)
HCT VFR BLD AUTO: 29.9 % (ref 42–52)
HGB BLD-MCNC: 9.6 GM/DL (ref 14–18)
MAGNESIUM SERPL-MCNC: 1.9 MG/DL (ref 1.6–2.4)
MCH RBC QN AUTO: 29.8 PG (ref 26–33)
MCHC RBC AUTO-ENTMCNC: 32.1 GM/DL (ref 32.2–35.5)
MCV RBC AUTO: 92.9 FL (ref 80–94)
PLATELET # BLD AUTO: 143 THOU/MM3 (ref 130–400)
PMV BLD AUTO: 10.2 FL (ref 9.4–12.4)
POTASSIUM SERPL-SCNC: 3.7 MEQ/L (ref 3.5–5.2)
RBC # BLD AUTO: 3.22 MILL/MM3 (ref 4.7–6.1)
SODIUM SERPL-SCNC: 137 MEQ/L (ref 135–145)
WBC # BLD AUTO: 4.9 THOU/MM3 (ref 4.8–10.8)

## 2024-09-05 PROCEDURE — 99232 SBSQ HOSP IP/OBS MODERATE 35: CPT | Performed by: INTERNAL MEDICINE

## 2024-09-05 PROCEDURE — 97162 PT EVAL MOD COMPLEX 30 MIN: CPT

## 2024-09-05 PROCEDURE — 99232 SBSQ HOSP IP/OBS MODERATE 35: CPT | Performed by: NURSE PRACTITIONER

## 2024-09-05 PROCEDURE — 6360000002 HC RX W HCPCS: Performed by: INTERNAL MEDICINE

## 2024-09-05 PROCEDURE — 80048 BASIC METABOLIC PNL TOTAL CA: CPT

## 2024-09-05 PROCEDURE — 97116 GAIT TRAINING THERAPY: CPT

## 2024-09-05 PROCEDURE — 82948 REAGENT STRIP/BLOOD GLUCOSE: CPT

## 2024-09-05 PROCEDURE — 83735 ASSAY OF MAGNESIUM: CPT

## 2024-09-05 PROCEDURE — 6370000000 HC RX 637 (ALT 250 FOR IP)

## 2024-09-05 PROCEDURE — 6370000000 HC RX 637 (ALT 250 FOR IP): Performed by: INTERNAL MEDICINE

## 2024-09-05 PROCEDURE — 6370000000 HC RX 637 (ALT 250 FOR IP): Performed by: STUDENT IN AN ORGANIZED HEALTH CARE EDUCATION/TRAINING PROGRAM

## 2024-09-05 PROCEDURE — 6360000002 HC RX W HCPCS

## 2024-09-05 PROCEDURE — 36415 COLL VENOUS BLD VENIPUNCTURE: CPT

## 2024-09-05 PROCEDURE — 85027 COMPLETE CBC AUTOMATED: CPT

## 2024-09-05 PROCEDURE — 2580000003 HC RX 258: Performed by: STUDENT IN AN ORGANIZED HEALTH CARE EDUCATION/TRAINING PROGRAM

## 2024-09-05 PROCEDURE — 97530 THERAPEUTIC ACTIVITIES: CPT

## 2024-09-05 PROCEDURE — 1200000000 HC SEMI PRIVATE

## 2024-09-05 PROCEDURE — 82330 ASSAY OF CALCIUM: CPT

## 2024-09-05 PROCEDURE — 90935 HEMODIALYSIS ONE EVALUATION: CPT

## 2024-09-05 RX ORDER — AMLODIPINE BESYLATE 10 MG/1
10 TABLET ORAL DAILY
Qty: 30 TABLET | Refills: 3 | Status: SHIPPED | OUTPATIENT
Start: 2024-09-05

## 2024-09-05 RX ORDER — OXYCODONE AND ACETAMINOPHEN 10; 325 MG/1; MG/1
1 TABLET ORAL EVERY 8 HOURS PRN
Qty: 15 TABLET | Refills: 0 | Status: SHIPPED | OUTPATIENT
Start: 2024-09-05 | End: 2024-09-10

## 2024-09-05 RX ORDER — LEVOTHYROXINE SODIUM 200 UG/1
200 TABLET ORAL DAILY
Qty: 30 TABLET | Refills: 3 | Status: SHIPPED | OUTPATIENT
Start: 2024-09-06

## 2024-09-05 RX ORDER — NEOMYCIN/BACITRACIN/POLYMYXINB 3.5-400-5K
OINTMENT (GRAM) TOPICAL
Qty: 1 EACH | Refills: 1 | Status: SHIPPED | OUTPATIENT
Start: 2024-09-05

## 2024-09-05 RX ADMIN — ROPINIROLE HYDROCHLORIDE 0.5 MG: 0.5 TABLET, FILM COATED ORAL at 20:39

## 2024-09-05 RX ADMIN — HEPARIN SODIUM 5000 UNITS: 5000 INJECTION INTRAVENOUS; SUBCUTANEOUS at 06:13

## 2024-09-05 RX ADMIN — SEVELAMER CARBONATE 800 MG: 800 TABLET, FILM COATED ORAL at 08:07

## 2024-09-05 RX ADMIN — HEPARIN SODIUM 5000 UNITS: 5000 INJECTION INTRAVENOUS; SUBCUTANEOUS at 14:11

## 2024-09-05 RX ADMIN — MORPHINE SULFATE 1 MG: 2 INJECTION, SOLUTION INTRAMUSCULAR; INTRAVENOUS at 14:05

## 2024-09-05 RX ADMIN — SEVELAMER CARBONATE 800 MG: 800 TABLET, FILM COATED ORAL at 17:23

## 2024-09-05 RX ADMIN — SERTRALINE 100 MG: 100 TABLET, FILM COATED ORAL at 14:10

## 2024-09-05 RX ADMIN — SEVELAMER CARBONATE 800 MG: 800 TABLET, FILM COATED ORAL at 14:11

## 2024-09-05 RX ADMIN — PRIMIDONE 50 MG: 50 TABLET ORAL at 20:39

## 2024-09-05 RX ADMIN — Medication 6 MG: at 20:39

## 2024-09-05 RX ADMIN — MORPHINE SULFATE 1 MG: 2 INJECTION, SOLUTION INTRAMUSCULAR; INTRAVENOUS at 01:53

## 2024-09-05 RX ADMIN — CLOPIDOGREL BISULFATE 75 MG: 75 TABLET ORAL at 14:11

## 2024-09-05 RX ADMIN — GABAPENTIN 600 MG: 300 CAPSULE ORAL at 20:39

## 2024-09-05 RX ADMIN — HEPARIN SODIUM 5000 UNITS: 5000 INJECTION INTRAVENOUS; SUBCUTANEOUS at 20:38

## 2024-09-05 RX ADMIN — CARVEDILOL 25 MG: 25 TABLET, FILM COATED ORAL at 17:23

## 2024-09-05 RX ADMIN — OXYCODONE HYDROCHLORIDE AND ACETAMINOPHEN 2 TABLET: 5; 325 TABLET ORAL at 10:38

## 2024-09-05 RX ADMIN — SODIUM CHLORIDE, PRESERVATIVE FREE 10 ML: 5 INJECTION INTRAVENOUS at 14:17

## 2024-09-05 RX ADMIN — LEVOTHYROXINE SODIUM 200 MCG: 0.1 TABLET ORAL at 06:13

## 2024-09-05 RX ADMIN — BACITRACIN ZINC NEOMYCIN SULFATE POLYMYXIN B SULFATE: 400; 3.5; 5 OINTMENT TOPICAL at 20:38

## 2024-09-05 RX ADMIN — MORPHINE SULFATE 1 MG: 2 INJECTION, SOLUTION INTRAMUSCULAR; INTRAVENOUS at 20:48

## 2024-09-05 RX ADMIN — MORPHINE SULFATE 1 MG: 2 INJECTION, SOLUTION INTRAMUSCULAR; INTRAVENOUS at 08:05

## 2024-09-05 RX ADMIN — PANTOPRAZOLE SODIUM 40 MG: 40 TABLET, DELAYED RELEASE ORAL at 06:14

## 2024-09-05 RX ADMIN — BACITRACIN ZINC NEOMYCIN SULFATE POLYMYXIN B SULFATE: 400; 3.5; 5 OINTMENT TOPICAL at 14:18

## 2024-09-05 RX ADMIN — OXYCODONE HYDROCHLORIDE AND ACETAMINOPHEN 2 TABLET: 5; 325 TABLET ORAL at 17:29

## 2024-09-05 RX ADMIN — AMLODIPINE BESYLATE 10 MG: 10 TABLET ORAL at 08:07

## 2024-09-05 RX ADMIN — CARVEDILOL 25 MG: 25 TABLET, FILM COATED ORAL at 08:08

## 2024-09-05 RX ADMIN — SODIUM CHLORIDE, PRESERVATIVE FREE 10 ML: 5 INJECTION INTRAVENOUS at 20:45

## 2024-09-05 RX ADMIN — ATORVASTATIN CALCIUM 40 MG: 40 TABLET, FILM COATED ORAL at 20:39

## 2024-09-05 ASSESSMENT — PAIN DESCRIPTION - DESCRIPTORS
DESCRIPTORS: ACHING
DESCRIPTORS: ACHING;SHOOTING
DESCRIPTORS: ACHING
DESCRIPTORS: SHARP;STABBING
DESCRIPTORS: ACHING;SHOOTING
DESCRIPTORS: SHARP;STABBING
DESCRIPTORS: ACHING

## 2024-09-05 ASSESSMENT — PAIN SCALES - GENERAL
PAINLEVEL_OUTOF10: 9
PAINLEVEL_OUTOF10: 8
PAINLEVEL_OUTOF10: 5
PAINLEVEL_OUTOF10: 7
PAINLEVEL_OUTOF10: 8
PAINLEVEL_OUTOF10: 7
PAINLEVEL_OUTOF10: 9
PAINLEVEL_OUTOF10: 8
PAINLEVEL_OUTOF10: 9
PAINLEVEL_OUTOF10: 8

## 2024-09-05 ASSESSMENT — PAIN DESCRIPTION - LOCATION
LOCATION: HEAD
LOCATION: NECK;HEAD
LOCATION: GENERALIZED
LOCATION: HEAD
LOCATION: FACE
LOCATION: FACE;NECK
LOCATION: FACE
LOCATION: HEAD

## 2024-09-05 NOTE — FLOWSHEET NOTE
Patient alert and oriented x 4, Pain 8 out of 10 in the facial region. Speech is appropriate and clear throughout. Patient voices no additional concerns at this time. Patients pupils equal, round, and reactive to light. Pupils size is 5 mm down to 3 mm with consensual response. The patients upper extremities are pink, warm, and dry with sensation present, no numbness or tingling voiced. The patients hand grasp is strong and equal bilaterally, upper extremities movement present, arm drift negative. The patients heart sounds are of regular rhythm, strong amplitude with a heart rate of 73. Patient has a regular breathing pattern and a moderate depth of respirations with symmetrical chest movement, lungs are clear throughout. The patients abdomen is flat, and bowel sounds are active in all four quadrants. Abdomen is non-tender with no masses palpated. Patients lower extremities are pink, warm, and dry with +1 pitting edema bilaterally. Patients pedal push and pull is weak bilaterally with limited movement, dorsalis pedis and posterior tibialis pulse are present and weak bilaterally. Writer was unable to assess bony prominences due to patient restricted movement. Findings were discussed with primary RN

## 2024-09-05 NOTE — PROGRESS NOTES
Hospitalist Progress Note    Patient:  Farhad Myles      Unit/Bed:8B-26/026-A    YOB: 1960    MRN: [id number]       Acct: [id number]     PCP: Dulce Maria Crespo MD    Date of Admission: 8/31/2024    Assessment/Plan:    End-stage renal disease--on hemodialysis Monday through Friday at home; had HD on 9/3, 9/4, 9/5; appreciate nephrology input; patient reported he was unable to go up the steps to get his dialysis equipment so has not had dialysis in approximately 3 weeks prior to arrival, states somebody is coming to get his equipment and moving downstairs~plan is to have another HD treatment tomorrow and then discharged to have time to get his equipment set up for next treatment on the  Mechanical fall--imaging negative, OT saw and recommends home with home health  Chronic normocytic anemia secondary to end-stage renal disease--stable  Elevated troponins likely secondary to demand mismatch with end-stage renal disease  Chronic hypoxic respiratory failure/restrictive airway disease--on 2 L of oxygen  Primary hypertension, uncontrolled--on Norvasc, Coreg; stable, monitor  Hyperlipidemia--on statin  Diabetes mellitus type 2 with neuropathy  Hypothyroidism--on Synthroid  GERD--on Protonix  SHRUTHI/MDD--treated  Action tremor--primidone, Requip  BPH  Spondylosis of lumbar region without myelopathy or radiculopathy--follows with pain management, OARRS report reviewed, on Percocet 10 3 times daily at home as needed; on Percocet via moderate/severe pain protocol every 4 hours.  Chronic heart failure with mildly reduced EF  Obesity class II with BMI 38.02      Expected discharge date: 9/6    Disposition:    [x] Home       [] TCU       [] Rehab       [] Psych       [] SNF       [] Long Term Care Facility       [] Other-    Chief Complaint: Pain    Hospital Course: Per progress note dated 9/3/2024: \"This is a 63-year-old male with past medical history of ESRD on HD, IDDM 2, restrictive airway

## 2024-09-05 NOTE — PROGRESS NOTES
Farhad Myles was evaluated today and a DME order was entered for a 4 wheeled walker with seat and breaks because he requires this to successfully complete daily living tasks of toileting, ambulating, and meal preparation.  A 4 wheeled walker with seat and breaks is necessary due to the patient's unsteady gait, upper body weakness, and inability to  an ambulation device; and he can ambulate only by pushing a walker instead of a lesser assistive device such as a cane, crutch, or standard walker.  The need for this equipment was discussed with the patient and he understands and is in agreement.

## 2024-09-05 NOTE — PROGRESS NOTES
[facility]  INPATIENT PHYSICAL THERAPY  EVALUATION  [facility] MED SURG 8AB - 8B-26/026-A    Discharge Recommendations: Home with Home health PT  Equipment Needed: Yes  Other: Rollator      Time In: 1420  Time Out: 1459  Timed Code Treatment Minutes: 25 Minutes  Minutes: 39          Date: 2024  Patient Name: Farhad Myles,  Gender:  male        MRN: [id number]  : 1960  (63 y.o.)      Referring Practitioner: Jr Boyd DO  Diagnosis: Palpitations  Additional Pertinent Hx: Per EMR: \"Pt with past medical history of ESRD on HD, IDDM 2, restrictive airway disease, CAD, HTN, HLD, hypothyroidism, GERD, SHRUTHI/MDD, RLS, and BPH who presented to [facility] on  for complaint of pain all over associated with increased anasarca and palpitation.  Patient undergoes HD sessions every Monday and Friday, but endorsed having not received dialysis for the past 3 weeks due to inability to climb stairs at home.  He states that his symptoms are associated with worsening shortness of breath and increased needing supplemental oxygen rotation.\"     Restrictions/Precautions:  Restrictions/Precautions: Fall Risk, General Precautions    Subjective:  Chart Reviewed: Yes  Patient assessed for rehabilitation services?: Yes  Family / Caregiver Present: No  Subjective: Clearance from RN to see pt this date. Pt was semi-supine in bed when PT arrived. Pt agreeable to PT session    General:  Overall Orientation Status: Within Normal Limits  Orientation Level: Oriented X4  Overall Cognitive Status: WNL  Vision: Impaired  Vision Exceptions: Wears glasses for reading  Hearing: Within functional limits       Pain: -/10: Pt denies pain this date    Vitals: Vitals not assessed per clinical judgement, see nursing flowsheet    Social/Functional History:    Lives With: Spouse  Type of Home: House  Home Layout: Two level, Performs ADL's on one level, Able to Live on Main level with bedroom/bathroom  Home Access: Stairs to enter with

## 2024-09-05 NOTE — CARE COORDINATION
DISCHARGE PLANNING EVALUATION  9/5/24, 4:11 PM EDT    Reason for Referral: HH needs  Decision Maker: Patient makes own decisions  Current Services: None  New Services Requested: HH; nursing, PT & OT  Family/ Social/ Home environment: Spoke with patient, home with with wife.  Wife assists with ADL's.  He is current with O2 and home HD machine.  Patient is agreeable to HH, nursing, PT & OT.  Gve him HH list, he will discuss with wife tonight.   Payment Source:[employer]  Transportation at Discharge: Wife  Post-acute (PAC) provider list was provided to patient. Patient was informed of their freedom to choose PAC provider. Discussed and offered to show the patient the relevant PAC Providers quality and resource use measures on Medicare Compare web site via computer based on patient's goals of care and treatment preferences. Questions regarding selection process were answered.      Teach Back Method used with patient regarding care plan and discharge planning.  Patient  verbalized understanding of the plan of care and contribute to goal setting.       Patient preferences and discharge plan: Home with wife and new HH.  Patient given list, will review with wife. SW will follow up.    Electronically signed by BETH Song on 9/5/2024 at 4:11 PM

## 2024-09-05 NOTE — PROGRESS NOTES
[facility]  INPATIENT OCCUPATIONAL THERAPY  STRZ MED SURG 8AB  EVALUATION      Discharge Recommendations: Home with Home health OT  Equipment Recommendations: Equipment Needed: No      Time In: 1710  Time Out: 1748  Timed Code Treatment Minutes: 23 Minutes  Minutes: 38          Date: 2024  Patient Name: Farhad Myles,   Gender: male      MRN: [id number]  : 1960  (63 y.o.)  Referring Practitioner: Dr. MATTHEW Body DO  Diagnosis: Palpitations  Additional Pertinent Hx: Pt with past medical history of ESRD on HD, IDDM 2, restrictive airway disease, CAD, HTN, HLD, hypothyroidism, GERD, SHRUTHI/MDD, RLS, and BPH who presented to [facility] on  for complaint of pain all over associated with increased anasarca and palpitation.  Patient undergoes HD sessions every Monday and Friday, but endorsed having not received dialysis for the past 3 weeks due to inability to climb stairs at home.  He states that his symptoms are associated with worsening shortness of breath and increased needing supplemental oxygen rotation.    Restrictions/Precautions:  Restrictions/Precautions: Fall Risk    Subjective  Chart Reviewed: Yes, Orders, History and Physical  Family / Caregiver Present: No    Subjective: Pleasant and cooperative  Comments: RN approved session. Pt agreed to demonstrate functional mobility.  He had hemodialysis this AM.  Pt had a morphine shot prior to session.  Pt C/O having a headache and soreness in his L knee from his fall.  Comments / Details: Pt had an ice pack placed on his L knee following session.  Bruising noted over his forehead and L orbit and his L knee.  Pt has chronic back pain for which he has to take percocet 3x/day.  Pt goes to pain mgmt clinic.    Pain: 7/10: Pt has a headache and also pain in his L knee. Pt has back pain which is chronic, per pt. An ice pack was placed over his knee at the end of the session.      Vitals: Oxygen: Pt shows 98% O2 saturation while on 3L of O2 while sitting  of evaluation.  Evaluation time included review of current medical information, gathering information related to past medical, social and functional history, completion of standardized testing, formal and informal observation of tasks, assessment of data and development of plan of care and goals.  Treatment time included skilled education and facilitation of tasks to increase safety and independence with ADL's for improved functional independence and quality of life.  Pt described his prior level of functioning.  He was doing his own ADLs.  He used a quad cane.    Pt does his own hemodialysis 2x/wk. He reported that he has been doing preparation work the day prior to the treatments and also that he feels very tired following the treatment.  Pt was motivated to work with therapy.  He reported not going out of his home very often for fear of COVID infection. He would prefer to have HH therapy services.  Discussed pt's chronic pain and that he has to take pain medications daily to manage his lower back pain.  He reported not varying the medication dosage which he is prescribed. Pt was concerned about altering his alertness level and increasing his chances of having an accident if he were to take more of the medication.  Encouragement offered.    Patient Education:          Patient Education  Education Given To: Patient  Education Provided: Role of Therapy;Plan of Care;Energy Conservation  Education Provided Comments: Knowing his body and strength of his immune system and it's ability to fight off infection  Education Method: Verbal  Barriers to Learning: None  Education Outcome: Verbalized understanding    Plan:  Times Per Week: 3-5x  Current Treatment Recommendations: Endurance training, Functional mobility training, Self-Care / ADL, Balance training, Patient/Caregiver education & training, Strengthening  Additional Comments: Pt would benefit from continued skilled OT services when medically stable and discharged from

## 2024-09-05 NOTE — PLAN OF CARE
Problem: Discharge Planning  Goal: Discharge to home or other facility with appropriate resources  Recent Flowsheet Documentation  Taken 9/4/2024 2309 by Nichole Smith RN  Discharge to home or other facility with appropriate resources:   Identify barriers to discharge with patient and caregiver   Arrange for needed discharge resources and transportation as appropriate   Identify discharge learning needs (meds, wound care, etc)     Problem: Discharge Planning  Goal: Discharge to home or other facility with appropriate resources  Outcome: Progressing  Flowsheets (Taken 9/4/2024 2309)  Discharge to home or other facility with appropriate resources:   Identify barriers to discharge with patient and caregiver   Arrange for needed discharge resources and transportation as appropriate   Identify discharge learning needs (meds, wound care, etc)     Problem: Pain  Goal: Verbalizes/displays adequate comfort level or baseline comfort level  Recent Flowsheet Documentation  Taken 9/4/2024 2309 by Nichole Smith RN  Verbalizes/displays adequate comfort level or baseline comfort level:   Encourage patient to monitor pain and request assistance   Assess pain using appropriate pain scale   Administer analgesics based on type and severity of pain and evaluate response   Implement non-pharmacological measures as appropriate and evaluate response     Problem: Pain  Goal: Verbalizes/displays adequate comfort level or baseline comfort level  Outcome: Progressing  Flowsheets (Taken 9/4/2024 2309)  Verbalizes/displays adequate comfort level or baseline comfort level:   Encourage patient to monitor pain and request assistance   Assess pain using appropriate pain scale   Administer analgesics based on type and severity of pain and evaluate response   Implement non-pharmacological measures as appropriate and evaluate response     Problem: Safety - Adult  Goal: Free from fall injury  Recent Flowsheet Documentation  Taken 9/4/2024 2309 by

## 2024-09-05 NOTE — PROGRESS NOTES
[facility]  PHYSICAL THERAPY MISSED TREATMENT NOTE  STRZ MED SURG 8AB    Date: 2024  Patient Name: Farhad Myles        MRN: [id number]   : 1960  (63 y.o.)  Gender: male                REASON FOR MISSED TREATMENT:  Pt off of floor for dialysis. PT to attempt at next availability.  .

## 2024-09-05 NOTE — FLOWSHEET NOTE
Patient alert and oriented x 4, Pain 8 out of 10 in the facial region. Speech is appropriate and clear throughout. Patient voices no additional concerns at this time. Patients pupils equal, round, and reactive to light. Pupils size is 5 mm down to 3 mm with consensual response. The patients upper extremities are pink, warm, and dry with sensation present, no numbness or tingling voiced. The patients hand grasp is strong and equal bilaterally, upper extremities movement present, arm drift negative. The patients heart sounds are of regular rhythm, strong amplitude with a heart rate of 73. Patient has a regular breathing pattern and a moderate depth of respirations with symmetrical chest movement, lungs are clear throughout. The patients abdomen is flat, and bowel sounds are active in all four quadrants. Abdomen is non-tender with no masses palpated. Patients lower extremities are pink, warm, and dry with +1 pitting edema bilaterally. Patients pedal push and pull is weak bilaterally with limited movement, dorsalis pedis and posterior tibialis pulse are present and weak bilaterally. Writer was unable to assess bony prominences due to patient restricted movement. Findings were discussed with primary RN. [facility]

## 2024-09-05 NOTE — FLOWSHEET NOTE
09/05/24 1000 09/05/24 1320   Vital Signs   /80 (!) 148/89   Temp 97.5 °F (36.4 °C) 98.2 °F (36.8 °C)   Pulse 62 59   Respirations 15 19   SpO2 94 % 96 %   Weight - Scale 133.2 kg (293 lb 10.4 oz) 130.2 kg (287 lb 0.6 oz)   Weight Method Bed scale Bed scale   Percent Weight Change 0.23 -2.25   Post-Hemodialysis Assessment   Post-Treatment Procedures  --  Blood returned;Access bleeding time < 10 minutes   Machine Disinfection Process  --  Acid/Vinegar Clean;Heat Disinfect;Exterior Machine Disinfection   Blood Volume Processed (Liters)  --  62.5 L   Dialyzer Clearance  --  Lightly streaked   Duration of Treatment (minutes)  --  180 minutes   Hemodialysis Intake (ml)  --  400 ml   Hemodialysis Output (ml)  --  3400 ml   NET Removed (ml)  --  3000   Tolerated Treatment  --  Good     Stable 3 hour treatment complete. Removed 3 liters of fluid as per order. Tolerated fluid removal well. Pressure held to needle sites times ten minutes each. Dressing clean, dry and intact. Report given to primary RN. Treatment record printed for scanning into EMR.

## 2024-09-05 NOTE — PROGRESS NOTES
[facility]   Nephrology progress note  9/5/2024, 11:52 AM      Pt Name:    Farhad Myles  MRN:     [id number]     YOB: 1960  Admit Date:    8/31/2024  1:17 PM    Chief Complaint: Nephrology following for ESRD and dialysis  Subjective:  Patient was seen and examined this morning  Reports pain is improving  Feels weak and tired  Reports his home dialysis equipment is scheduled to be moved downstairs later today    Objective:  24HR INTAKE/OUTPUT:    Intake/Output Summary (Last 24 hours) at 9/5/2024 1152  Last data filed at 9/4/2024 1233  Gross per 24 hour   Intake 4400 ml   Output --   Net 4400 ml         I/O last 3 completed shifts:  In: 4640 [P.O.:240; I.V.:4400]  Out: -   No intake/output data recorded.   Admission weight: (!) 148 kg (326 lb 4.5 oz)    Wt Readings from Last 3 Encounters:   09/05/24 133.2 kg (293 lb 10.4 oz)   08/26/24 (!) 145.8 kg (321 lb 6.4 oz)   08/21/24 (!) 142.4 kg (314 lb)        Vitals :   Vitals:    09/05/24 0600 09/05/24 0730 09/05/24 0915 09/05/24 1000   BP:  (!) 178/94 (!) 148/105 133/80   Pulse:  73 63 62   Resp:  18 18 15   Temp:  97.3 °F (36.3 °C) 97.7 °F (36.5 °C) 97.5 °F (36.4 °C)   TempSrc:  Oral Oral    SpO2:  97% 94% 94%   Weight: 132.9 kg (292 lb 15.9 oz)   133.2 kg (293 lb 10.4 oz)   Height:           Physical examination  General Appearance: + Prominent bruise, periorbital ecchymosis  Lungs: no use of accessory muscles, no labored breathing  GI: soft, non-tender, no guarding  Extremities: Bilateral 2+ LE edema    Medications:  Infusion:    dextrose      sodium chloride       Meds:    amLODIPine  10 mg Oral Daily    levothyroxine  200 mcg Oral Daily    neomycin-bacitracin-polymyxin   Topical BID    [Held by provider] aspirin EC  81 mg Oral Daily    atorvastatin  40 mg Oral Nightly    carvedilol  25 mg Oral BID with meals    clopidogrel  75 mg Oral Daily    gabapentin  600 mg Oral Nightly    pantoprazole  40 mg Oral QAM AC    primidone  100  ultrafiltration  Fall  Coronary disease  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 It may contain minor errors which are inherent in voice recognition technology

## 2024-09-05 NOTE — PROGRESS NOTES
.[facility]  OCCUPATIONAL THERAPY MISSED TREATMENT NOTE  STRZ MED SURG 8AB  8B-26/026-A      Date: 2024  Patient Name: Farhad Myles        CSN: [id number]   : 1960  (63 y.o.)  Gender: male   Referring Practitioner: Dr. MATTHEW Boyd DO  Diagnosis: Palpitations         REASON FOR MISSED TREATMENT: Patient Off Floor for Dialysis, will attempt as time allows

## 2024-09-06 VITALS
OXYGEN SATURATION: 100 % | BODY MASS INDEX: 36.76 KG/M2 | RESPIRATION RATE: 17 BRPM | HEART RATE: 63 BPM | HEIGHT: 73 IN | DIASTOLIC BLOOD PRESSURE: 92 MMHG | WEIGHT: 277.34 LBS | TEMPERATURE: 98 F | SYSTOLIC BLOOD PRESSURE: 160 MMHG

## 2024-09-06 LAB
ANION GAP SERPL CALC-SCNC: 11 MEQ/L (ref 8–16)
BUN SERPL-MCNC: 19 MG/DL (ref 7–22)
CA-I BLD ISE-SCNC: 1.1 MMOL/L (ref 1.12–1.32)
CALCIUM SERPL-MCNC: 8.5 MG/DL (ref 8.5–10.5)
CHLORIDE SERPL-SCNC: 99 MEQ/L (ref 98–111)
CO2 SERPL-SCNC: 26 MEQ/L (ref 23–33)
CREAT SERPL-MCNC: 3.3 MG/DL (ref 0.4–1.2)
DEPRECATED RDW RBC AUTO: 51 FL (ref 35–45)
ERYTHROCYTE [DISTWIDTH] IN BLOOD BY AUTOMATED COUNT: 15.2 % (ref 11.5–14.5)
GFR SERPL CREATININE-BSD FRML MDRD: 20 ML/MIN/1.73M2
GLUCOSE BLD STRIP.AUTO-MCNC: 90 MG/DL (ref 70–108)
GLUCOSE SERPL-MCNC: 78 MG/DL (ref 70–108)
HCT VFR BLD AUTO: 30.6 % (ref 42–52)
HGB BLD-MCNC: 9.8 GM/DL (ref 14–18)
MAGNESIUM SERPL-MCNC: 1.8 MG/DL (ref 1.6–2.4)
MCH RBC QN AUTO: 29.4 PG (ref 26–33)
MCHC RBC AUTO-ENTMCNC: 32 GM/DL (ref 32.2–35.5)
MCV RBC AUTO: 91.9 FL (ref 80–94)
PLATELET # BLD AUTO: 152 THOU/MM3 (ref 130–400)
PMV BLD AUTO: 10.2 FL (ref 9.4–12.4)
POTASSIUM SERPL-SCNC: 3.9 MEQ/L (ref 3.5–5.2)
RBC # BLD AUTO: 3.33 MILL/MM3 (ref 4.7–6.1)
SODIUM SERPL-SCNC: 136 MEQ/L (ref 135–145)
WBC # BLD AUTO: 4.6 THOU/MM3 (ref 4.8–10.8)

## 2024-09-06 PROCEDURE — 6370000000 HC RX 637 (ALT 250 FOR IP): Performed by: INTERNAL MEDICINE

## 2024-09-06 PROCEDURE — 80048 BASIC METABOLIC PNL TOTAL CA: CPT

## 2024-09-06 PROCEDURE — 36415 COLL VENOUS BLD VENIPUNCTURE: CPT

## 2024-09-06 PROCEDURE — 82948 REAGENT STRIP/BLOOD GLUCOSE: CPT

## 2024-09-06 PROCEDURE — 6370000000 HC RX 637 (ALT 250 FOR IP)

## 2024-09-06 PROCEDURE — 99239 HOSP IP/OBS DSCHRG MGMT >30: CPT | Performed by: NURSE PRACTITIONER

## 2024-09-06 PROCEDURE — 83735 ASSAY OF MAGNESIUM: CPT

## 2024-09-06 PROCEDURE — 90935 HEMODIALYSIS ONE EVALUATION: CPT

## 2024-09-06 PROCEDURE — 6360000002 HC RX W HCPCS

## 2024-09-06 PROCEDURE — 6370000000 HC RX 637 (ALT 250 FOR IP): Performed by: STUDENT IN AN ORGANIZED HEALTH CARE EDUCATION/TRAINING PROGRAM

## 2024-09-06 PROCEDURE — 85027 COMPLETE CBC AUTOMATED: CPT

## 2024-09-06 PROCEDURE — 6360000002 HC RX W HCPCS: Performed by: INTERNAL MEDICINE

## 2024-09-06 PROCEDURE — 90935 HEMODIALYSIS ONE EVALUATION: CPT | Performed by: INTERNAL MEDICINE

## 2024-09-06 PROCEDURE — 82330 ASSAY OF CALCIUM: CPT

## 2024-09-06 RX ADMIN — HEPARIN SODIUM 5000 UNITS: 5000 INJECTION INTRAVENOUS; SUBCUTANEOUS at 05:22

## 2024-09-06 RX ADMIN — MORPHINE SULFATE 1 MG: 2 INJECTION, SOLUTION INTRAMUSCULAR; INTRAVENOUS at 13:20

## 2024-09-06 RX ADMIN — SERTRALINE 100 MG: 100 TABLET, FILM COATED ORAL at 12:12

## 2024-09-06 RX ADMIN — OXYCODONE HYDROCHLORIDE AND ACETAMINOPHEN 2 TABLET: 5; 325 TABLET ORAL at 12:12

## 2024-09-06 RX ADMIN — LEVOTHYROXINE SODIUM 200 MCG: 0.1 TABLET ORAL at 05:25

## 2024-09-06 RX ADMIN — BACITRACIN ZINC NEOMYCIN SULFATE POLYMYXIN B SULFATE: 400; 3.5; 5 OINTMENT TOPICAL at 12:14

## 2024-09-06 RX ADMIN — AMLODIPINE BESYLATE 10 MG: 10 TABLET ORAL at 12:12

## 2024-09-06 RX ADMIN — OXYCODONE HYDROCHLORIDE AND ACETAMINOPHEN 2 TABLET: 5; 325 TABLET ORAL at 02:01

## 2024-09-06 RX ADMIN — MORPHINE SULFATE 1 MG: 2 INJECTION, SOLUTION INTRAMUSCULAR; INTRAVENOUS at 05:23

## 2024-09-06 RX ADMIN — CARVEDILOL 25 MG: 25 TABLET, FILM COATED ORAL at 12:15

## 2024-09-06 RX ADMIN — PRIMIDONE 100 MG: 50 TABLET ORAL at 12:12

## 2024-09-06 RX ADMIN — SEVELAMER CARBONATE 800 MG: 800 TABLET, FILM COATED ORAL at 12:12

## 2024-09-06 RX ADMIN — CLOPIDOGREL BISULFATE 75 MG: 75 TABLET ORAL at 12:12

## 2024-09-06 RX ADMIN — PANTOPRAZOLE SODIUM 40 MG: 40 TABLET, DELAYED RELEASE ORAL at 05:22

## 2024-09-06 ASSESSMENT — PAIN DESCRIPTION - DESCRIPTORS
DESCRIPTORS: DISCOMFORT
DESCRIPTORS: ACHING
DESCRIPTORS: ACHING;DISCOMFORT
DESCRIPTORS: ACHING

## 2024-09-06 ASSESSMENT — PAIN SCALES - GENERAL
PAINLEVEL_OUTOF10: 9
PAINLEVEL_OUTOF10: 9
PAINLEVEL_OUTOF10: 8
PAINLEVEL_OUTOF10: 9
PAINLEVEL_OUTOF10: 8
PAINLEVEL_OUTOF10: 7
PAINLEVEL_OUTOF10: 8

## 2024-09-06 ASSESSMENT — PAIN - FUNCTIONAL ASSESSMENT: PAIN_FUNCTIONAL_ASSESSMENT: ACTIVITIES ARE NOT PREVENTED

## 2024-09-06 ASSESSMENT — PAIN DESCRIPTION - LOCATION
LOCATION: OTHER (COMMENT)
LOCATION: HEAD;NECK
LOCATION: HEAD;NECK;BACK

## 2024-09-06 ASSESSMENT — PAIN DESCRIPTION - ORIENTATION: ORIENTATION: OTHER (COMMENT)

## 2024-09-06 NOTE — CARE COORDINATION
9/6/24, 1:45 PM EDT    DISCHARGE PLANNING EVALUATION     CM received call from Antoinette at [facility], patient has referral from family doctor for PT, he is on list to be seen.    Spoke with patient regarding discharge plan and HH.  Advised [facility] has a referral for PT and he is on the list to be seen.  He is agreeable to [facility] nursing, PT & OT.  His wife will transport at discharge, he is able to get into vehicle.    9/6/24, 1:50 PM EDT    Patient goals/plan/ treatment preferences discussed by  and .  Patient goals/plan/ treatment preferences reviewed with patient/ family.  Patient/ family verbalize understanding of discharge plan and are in agreement with goal/plan/treatment preferences.  Understanding was demonstrated using the teach back method.  AVS provided by RN at time of discharge, which includes all necessary medical information pertaining to the patients current course of illness, treatment, post-discharge goals of care, and treatment preferences.     Services At/After Discharge: Home Health, Nursing service, OT, and PT       Patient discharge home with with wife and new [facility].  Antoinette at [facility] informed of discharge.

## 2024-09-06 NOTE — PROGRESS NOTES
AC    primidone  100 mg Oral QAM    primidone  50 mg Oral Nightly    rOPINIRole  0.5 mg Oral Nightly    sertraline  100 mg Oral Daily    sevelamer  800 mg Oral TID WC    insulin lispro  0-4 Units SubCUTAneous TID WC    insulin lispro  0-4 Units SubCUTAneous Nightly    sodium chloride flush  5-40 mL IntraVENous 2 times per day    heparin (porcine)  5,000 Units SubCUTAneous 3 times per day     Meds prn: hydrALAZINE, morphine, oxyCODONE-acetaminophen **OR** oxyCODONE-acetaminophen, albuterol sulfate HFA, ipratropium 0.5 mg-albuterol 2.5 mg, melatonin, glucose, dextrose bolus **OR** dextrose bolus, glucagon (rDNA), dextrose, sodium chloride flush, sodium chloride, polyethylene glycol, acetaminophen **OR** acetaminophen, prochlorperazine     Lab Data :  CBC:   Recent Labs     09/04/24  0602 09/05/24  0626 09/06/24  0601   WBC 4.9 4.9 4.6*   HGB 9.7* 9.6* 9.8*   HCT 30.7* 29.9* 30.6*    143 152     CMP:  Recent Labs     09/04/24  0602 09/05/24  0626 09/06/24  0601    137 136   K 3.7 3.7 3.9    102 99   CO2 26 27 26   BUN 29* 23* 19   CREATININE 3.6* 3.5* 3.3*   GLUCOSE 89 84 78   CALCIUM 8.5 8.2* 8.5   MG 1.9 1.9 1.8     Hepatic:   No results for input(s): \"LABALBU\", \"AST\", \"ALT\", \"BILITOT\", \"ALKPHOS\" in the last 72 hours.    Invalid input(s): \"ALB\"        Assessment and Plan:  ESRD on dialysis (on home hemodialysis)  Doing better with fluid removal  Seen on dialysis.  Tolerating hemodialysis treatment well.  Patient reports his dialysis equipment has been moved downstairs and reports \" everything is set up.\"  I spoke with homehemo dialysis RN Maye yesterday  Volume overload/anasarca.  Patient did not do dialysis for 3 weeks.  Came in massively fluid overloaded.  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 It may contain minor errors which are inherent in voice recognition technology

## 2024-09-06 NOTE — DISCHARGE SUMMARY
collection measuring 17 cm    maximum axial dimension in the right lateral chest wall extending    inferiorly into the right lateral flank and upper abdomen. This likely    represents some pooling edema related to fluid third-spacing with    suspected acute hematoma component.      This document has been electronically signed by: Félix Keenan MD on    09/01/2024 12:59 AM      All CTs at this facility use dose modulation techniques and iterative    reconstructions, and/or weight-based dosing   when appropriate to reduce radiation to a low as reasonably achievable.      CT THORACIC RECONSTRUCTION WO POST PROCESS   Final Result   1. No acute thoracic spine fracture.   2. Findings of volume overload and fluid third-spacing.      This document has been electronically signed by: Félix Keenan MD on    09/01/2024 12:55 AM      All CTs at this facility use dose modulation techniques and iterative    reconstructions, and/or weight-based dosing   when appropriate to reduce radiation to a low as reasonably achievable.      CT LUMBAR RECONSTRUCTION WO POST PROCESS   Final Result   No acute lumbar spine fracture or malalignment.      This document has been electronically signed by: Félix Keenan MD on    09/01/2024 01:06 AM      All CTs at this facility use dose modulation techniques and iterative    reconstructions, and/or weight-based dosing   when appropriate to reduce radiation to a low as reasonably achievable.      CT Head W/O Contrast   Final Result   1. No mass effect or acute hemorrhage.   2. Mild periventricular small vessel ischemic changes.            **This report has been created using voice recognition software. It may contain   minor errors which are inherent in voice recognition technology.**            Electronically signed by Dr. Josh Mulligan      CT ABDOMEN PELVIS WO CONTRAST Additional Contrast? None   Final Result   1. There is mild ascites present along with a somewhat nodular appearance of the    liver contour suggesting possible cirrhosis.   2. There is hepatosplenomegaly noted.   3. Mild retroperitoneal para-aortic lymphadenopathy at the level of the renal   arteries is noted. This is nonspecific.   4. There is diffuse body wall edema likely representing anasarca.   5. There is a small hernia along the lateral right lower abdominal wall on axial   image 72 containing a short segment of small bowel. However, no definite   evidence of associated obstruction or strangulation is seen.         **This report has been created using voice recognition software.  It may contain   minor errors which are inherent in voice recognition technology.**      Electronically signed by Dr. Bhaskar Hensley             Consults:     IP CONSULT TO NEPHROLOGY  IP CONSULT TO TRAUMA SURGERY  IP CONSULT TO SOCIAL WORK    Disposition:    [x] Home       [] TCU       [] Rehab       [] Psych       [] SNF       [] Long Term Care Facility       [] Other-    Condition at Discharge: Stable    Code Status:  Full Code     Pending tests at discharge:    None    Patient Instructions:    Discharge lab work: None  Activity: activity as tolerated  Diet: ADULT DIET; Regular; 4 carb choices (60 gm/meal); Low Sodium (2 gm); Low Phosphorus (Less than 1000 mg); 2000 ml      Follow-up visits:   Dulce Maria Crespo MD  [address] [place] [address], [address] [place] [address]  [phone]    Follow up on 9/12/2024  Appointment September 12, 2024 at 1:20pm         Discharge Medications:        Medication List        START taking these medications      neomycin-bacitracin-polymyxin 5-400-5000 ointment  Apply topically 4 times daily to the wounds on your face.            CHANGE how you take these medications      levothyroxine 200 MCG tablet  Commonly known as: SYNTHROID  Take 1 tablet by mouth daily  What changed:   medication strength  how much to take     rOPINIRole 0.5 MG tablet  Commonly known as: REQUIP  TAKE 1 TABLET IN THE AFTERNOON AND 2 TABLETS AT

## 2024-09-06 NOTE — PROGRESS NOTES
[facility]  PHYSICAL THERAPY MISSED TREATMENT NOTE  STRZ MED SURG 8AB    Date: 2024  Patient Name: Farhad Myles        MRN: [id number]   : 1960  (63 y.o.)  Gender: male   Referring Practitioner: Jr Boyd DO  Diagnosis: Palpitations         REASON FOR MISSED TREATMENT:  Patient at testing and/or off unit.  Pt is in dialysis at this time. PT to attempt to see at next available date as time allows and as able.

## 2024-09-06 NOTE — PLAN OF CARE
Problem: Discharge Planning  Goal: Discharge to home or other facility with appropriate resources  Outcome: Progressing  Flowsheets (Taken 9/4/2024 2309)  Discharge to home or other facility with appropriate resources:   Identify barriers to discharge with patient and caregiver   Arrange for needed discharge resources and transportation as appropriate   Identify discharge learning needs (meds, wound care, etc)     Problem: Pain  Goal: Verbalizes/displays adequate comfort level or baseline comfort level  Outcome: Progressing  Flowsheets (Taken 9/4/2024 2309)  Verbalizes/displays adequate comfort level or baseline comfort level:   Encourage patient to monitor pain and request assistance   Assess pain using appropriate pain scale   Administer analgesics based on type and severity of pain and evaluate response   Implement non-pharmacological measures as appropriate and evaluate response     Problem: Safety - Adult  Goal: Free from fall injury  Outcome: Progressing  Flowsheets (Taken 9/4/2024 2309)  Free From Fall Injury:   Instruct family/caregiver on patient safety   Based on caregiver fall risk screen, instruct family/caregiver to ask for assistance with transferring infant if caregiver noted to have fall risk factors     Problem: Nutrition Deficit:  Goal: Optimize nutritional status  Outcome: Progressing  Flowsheets (Taken 9/4/2024 2309)  Nutrient intake appropriate for improving, restoring, or maintaining nutritional needs:   Assess nutritional status and recommend course of action   Recommend appropriate diets, oral nutritional supplements, and vitamin/mineral supplements     Problem: Skin/Tissue Integrity  Goal: Absence of new skin breakdown  Description: 1.  Monitor for areas of redness and/or skin breakdown  2.  Assess vascular access sites hourly  3.  Every 4-6 hours minimum:  Change oxygen saturation probe site  4.  Every 4-6 hours:  If on nasal continuous positive airway pressure, respiratory therapy assess

## 2024-09-06 NOTE — FLOWSHEET NOTE
09/06/24 1155   Vital Signs   BP (!) 166/93   Temp 97.6 °F (36.4 °C)   Pulse 61   Respirations 12   Weight - Scale 125.8 kg (277 lb 5.4 oz)   Weight Method Bed scale   Percent Weight Change -1.72   Post-Hemodialysis Assessment   Post-Treatment Procedures Blood returned;Access bleeding time < 10 minutes   Machine Disinfection Process Acid/Vinegar Clean;Heat Disinfect;Exterior Machine Disinfection   Rinseback Volume (ml) 400 ml   Blood Volume Processed (Liters) 74.2 L   Dialyzer Clearance Lightly streaked   Duration of Treatment (minutes) 210 minutes   Hemodialysis Output (ml) 2500 ml   Tolerated Treatment Good     completed 3.5 hr HD TX and removed 2.5 Liters of fluid, as ordered. pt tolerated HD TX well. Dialysis TX ended, all blood returned with 400 mls rinse back. removed X2 (15g Needles) applied sterile gauze with light pressure for less than 10 min holding. pt returned to his room with left, upper arm dressing clean, dry and intact. report given to primary nurse, record completed and printed to be scanned into pt's EMR.

## 2024-09-09 ENCOUNTER — TELEPHONE (OUTPATIENT)
Dept: INTERNAL MEDICINE CLINIC | Age: 64
End: 2024-09-09

## 2024-09-09 ENCOUNTER — HOSPITAL ENCOUNTER (OUTPATIENT)
Age: 64
Setting detail: SPECIMEN
Discharge: HOME | End: 2024-09-09
Payer: MEDICARE

## 2024-09-09 DIAGNOSIS — F32.9 MAJOR DEPRESSIVE DISORDER WITH CURRENT ACTIVE EPISODE, UNSPECIFIED DEPRESSION EPISODE SEVERITY, UNSPECIFIED WHETHER RECURRENT: ICD-10-CM

## 2024-09-09 LAB
ANION GAP SERPL CALC-SCNC: 21 MEQ/L (ref 8–16)
BUN SERPL-MCNC: 35 MG/DL (ref 7–22)
CALCIUM SERPL-MCNC: 8.7 MG/DL (ref 8.5–10.5)
CHLORIDE SERPL-SCNC: 97 MEQ/L (ref 98–111)
CO2 SERPL-SCNC: 19 MEQ/L (ref 23–33)
CREAT SERPL-MCNC: 4.3 MG/DL (ref 0.4–1.2)
GFR SERPL CREATININE-BSD FRML MDRD: 15 ML/MIN/1.73M2
GLUCOSE SERPL-MCNC: 89 MG/DL (ref 70–108)
POTASSIUM SERPL-SCNC: 4.1 MEQ/L (ref 3.5–5.2)
SODIUM SERPL-SCNC: 137 MEQ/L (ref 135–145)

## 2024-09-09 PROCEDURE — 80048 BASIC METABOLIC PNL TOTAL CA: CPT

## 2024-09-09 PROCEDURE — 36415 COLL VENOUS BLD VENIPUNCTURE: CPT

## 2024-09-10 RX ORDER — SERTRALINE HYDROCHLORIDE 100 MG/1
100 TABLET, FILM COATED ORAL DAILY
Qty: 90 TABLET | Refills: 0 | OUTPATIENT
Start: 2024-09-10 | End: 2024-12-09

## 2024-09-11 RX ORDER — TAMSULOSIN HYDROCHLORIDE 0.4 MG/1
0.4 CAPSULE ORAL DAILY
Qty: 90 CAPSULE | Refills: 3 | OUTPATIENT
Start: 2024-09-11

## 2024-09-12 DIAGNOSIS — M48.062 SPINAL STENOSIS OF LUMBAR REGION WITH NEUROGENIC CLAUDICATION: ICD-10-CM

## 2024-09-12 DIAGNOSIS — G89.29 CHRONIC PAIN OF BOTH KNEES: ICD-10-CM

## 2024-09-12 DIAGNOSIS — M25.561 CHRONIC PAIN OF BOTH KNEES: ICD-10-CM

## 2024-09-12 DIAGNOSIS — M47.816 LUMBAR FACET ARTHROPATHY: ICD-10-CM

## 2024-09-12 DIAGNOSIS — G89.4 CHRONIC PAIN SYNDROME: ICD-10-CM

## 2024-09-12 DIAGNOSIS — M17.0 PRIMARY OSTEOARTHRITIS OF BOTH KNEES: ICD-10-CM

## 2024-09-12 DIAGNOSIS — M47.816 SPONDYLOSIS OF LUMBAR REGION WITHOUT MYELOPATHY OR RADICULOPATHY: ICD-10-CM

## 2024-09-12 DIAGNOSIS — M25.562 CHRONIC PAIN OF BOTH KNEES: ICD-10-CM

## 2024-09-12 DIAGNOSIS — G62.9 NEUROPATHY: ICD-10-CM

## 2024-09-12 RX ORDER — OXYCODONE AND ACETAMINOPHEN 10; 325 MG/1; MG/1
1 TABLET ORAL EVERY 8 HOURS PRN
Qty: 90 TABLET | Refills: 0 | Status: SHIPPED | OUTPATIENT
Start: 2024-09-12 | End: 2024-10-12

## 2024-09-16 ENCOUNTER — TELEPHONE (OUTPATIENT)
Dept: INTERNAL MEDICINE CLINIC | Age: 64
End: 2024-09-16

## 2024-09-16 DIAGNOSIS — F32.9 MAJOR DEPRESSIVE DISORDER WITH CURRENT ACTIVE EPISODE, UNSPECIFIED DEPRESSION EPISODE SEVERITY, UNSPECIFIED WHETHER RECURRENT: ICD-10-CM

## 2024-09-16 RX ORDER — SERTRALINE HYDROCHLORIDE 100 MG/1
100 TABLET, FILM COATED ORAL DAILY
Qty: 7 TABLET | Refills: 0 | Status: SHIPPED | OUTPATIENT
Start: 2024-09-16 | End: 2024-09-23

## 2024-09-16 RX ORDER — VENLAFAXINE HYDROCHLORIDE 150 MG/1
150 CAPSULE, EXTENDED RELEASE ORAL DAILY
Qty: 90 CAPSULE | Refills: 1 | Status: SHIPPED | OUTPATIENT
Start: 2024-09-16

## 2024-09-16 RX ORDER — VENLAFAXINE HYDROCHLORIDE 75 MG/1
75 CAPSULE, EXTENDED RELEASE ORAL DAILY
Qty: 7 CAPSULE | Refills: 0 | Status: SHIPPED | OUTPATIENT
Start: 2024-09-16 | End: 2024-09-23

## 2024-09-18 ENCOUNTER — OFFICE VISIT (OUTPATIENT)
Dept: INTERNAL MEDICINE CLINIC | Age: 64
End: 2024-09-18

## 2024-09-18 VITALS
TEMPERATURE: 97.3 F | HEART RATE: 65 BPM | HEIGHT: 73 IN | BODY MASS INDEX: 38.36 KG/M2 | SYSTOLIC BLOOD PRESSURE: 132 MMHG | OXYGEN SATURATION: 98 % | WEIGHT: 289.4 LBS | DIASTOLIC BLOOD PRESSURE: 80 MMHG | RESPIRATION RATE: 16 BRPM

## 2024-09-18 DIAGNOSIS — J45.20 MILD INTERMITTENT ASTHMA WITHOUT COMPLICATION: ICD-10-CM

## 2024-09-18 DIAGNOSIS — M05.20 RHEUMATOID ARTERITIS (HCC): ICD-10-CM

## 2024-09-18 DIAGNOSIS — I50.22 CHRONIC SYSTOLIC (CONGESTIVE) HEART FAILURE (HCC): Primary | ICD-10-CM

## 2024-09-18 DIAGNOSIS — N25.81 SECONDARY HYPERPARATHYROIDISM OF RENAL ORIGIN (HCC): ICD-10-CM

## 2024-09-18 DIAGNOSIS — N18.6 ESRD (END STAGE RENAL DISEASE) (HCC): ICD-10-CM

## 2024-09-18 DIAGNOSIS — M17.0 PRIMARY OSTEOARTHRITIS OF BOTH KNEES: ICD-10-CM

## 2024-09-18 DIAGNOSIS — Z09 HOSPITAL DISCHARGE FOLLOW-UP: ICD-10-CM

## 2024-09-18 DIAGNOSIS — F11.20 OPIOID DEPENDENCE WITH CURRENT USE (HCC): ICD-10-CM

## 2024-09-18 PROBLEM — E44.0 MODERATE PROTEIN-CALORIE MALNUTRITION (HCC): Status: RESOLVED | Noted: 2022-08-25 | Resolved: 2024-09-18

## 2024-09-18 RX ORDER — ALBUTEROL SULFATE 90 UG/1
2 INHALANT RESPIRATORY (INHALATION) EVERY 6 HOURS PRN
Qty: 1 EACH | Refills: 5 | Status: CANCELLED | OUTPATIENT
Start: 2024-09-18

## 2024-09-18 RX ORDER — CALCIUM CARBONATE 160(400)MG
TABLET,CHEWABLE ORAL
Qty: 1 EACH | Refills: 0 | Status: SHIPPED | OUTPATIENT
Start: 2024-09-18

## 2024-09-18 RX ORDER — IPRATROPIUM BROMIDE AND ALBUTEROL SULFATE 2.5; .5 MG/3ML; MG/3ML
1 SOLUTION RESPIRATORY (INHALATION) EVERY 4 HOURS PRN
Qty: 360 ML | Refills: 0 | Status: SHIPPED | OUTPATIENT
Start: 2024-09-18 | End: 2024-10-07

## 2024-09-18 NOTE — PROGRESS NOTES
Post-Discharge Transitional Care  Follow Up      Farhad Myles   YOB: 1960    Date of Office Visit:  9/18/2024  Date of Hospital Admission: 8/31/24  Date of Hospital Discharge: 9/6/24  Risk of hospital readmission (high >=14%. Medium >=10%) :Readmission Risk Score: 22.8      Care management risk score Rising risk (score 2-5) and Complex Care (Scores >=6): No Risk Score On File     Non face to face  following discharge, date last encounter closed (first attempt may have been earlier): 09/09/2024    Call initiated 2 business days of discharge: Yes    ASSESSMENT/PLAN:   Chronic systolic (congestive) heart failure  ESRD (end stage renal disease) (HCC)  Mild intermittent asthma without complication  Primary osteoarthritis of both knees  Secondary hyperparathyroidism of renal origin (HCC)  Rheumatoid arteritis (HCC)  Opioid dependence with current use (HCC)  Hospital discharge follow-up  -     CO DISCHARGE MEDS RECONCILED W/ CURRENT OUTPATIENT MED LIST      Medical Decision Making: moderate complexity  Return in about 6 weeks (around 10/30/2024).           Subjective:   HPI:  Follow up of Hospital problems/diagnosis(es): Chronic systolic heart failure, ESRD, mild intermittent asthma, osteoarthritis of both knees.    Inpatient course: Discharge summary reviewed- see chart.    Interval history/Current status: Patient is here for follow-up of a hospital visit.  Discharged 9/6/2024.  Presented to the hospital for bleeding overload in setting of missed dialysis sessions.  Patient has his dialysis machine upstairs at his house.  Was having increasing bilateral knee pain was unable to get up the stairs so he did not get his dialysis sessions.    Is having increasing difficulties with ambulation and shortness of breath.  This is multifactorial 2/2 hypervolemia in setting of systolic heart failure and ESRD.  Patient also has osteoarthritis of bilateral knees with notable crepitus on exam.  Ambulation is limited

## 2024-09-18 NOTE — PATIENT INSTRUCTIONS
Start taking Mucinex  Will send prescription for duonebs   Continue 100mg Zoloft for one week with Effexor 75mg daily  After 1 week, discontinue the Zoloft and start taking Effexor 150mg daily   rollator and shower chair   Follow up in 6 weeks

## 2024-09-20 ENCOUNTER — HOSPITAL ENCOUNTER (OUTPATIENT)
Dept: INTERVENTIONAL RADIOLOGY/VASCULAR | Age: 64
Discharge: HOME | End: 2024-09-20
Payer: MEDICARE

## 2024-09-20 VITALS
DIASTOLIC BLOOD PRESSURE: 78 MMHG | OXYGEN SATURATION: 92 % | WEIGHT: 285.27 LBS | SYSTOLIC BLOOD PRESSURE: 153 MMHG | RESPIRATION RATE: 18 BRPM | BODY MASS INDEX: 37.64 KG/M2 | TEMPERATURE: 98 F | HEART RATE: 73 BPM

## 2024-09-20 DIAGNOSIS — N18.6 ESRD (END STAGE RENAL DISEASE) (HCC): ICD-10-CM

## 2024-09-20 PROCEDURE — 6360000002 HC RX W HCPCS: Performed by: RADIOLOGY

## 2024-09-20 PROCEDURE — 2709999900 IR ANGIOGRAM ARTERIOVENOUS SHUNT

## 2024-09-20 PROCEDURE — 6360000004 HC RX CONTRAST MEDICATION: Performed by: RADIOLOGY

## 2024-09-20 PROCEDURE — 6370000000 HC RX 637 (ALT 250 FOR IP): Performed by: RADIOLOGY

## 2024-09-20 PROCEDURE — 2580000003 HC RX 258: Performed by: RADIOLOGY

## 2024-09-20 PROCEDURE — 36902 INTRO CATH DIALYSIS CIRCUIT: CPT

## 2024-09-20 PROCEDURE — 36901 INTRO CATH DIALYSIS CIRCUIT: CPT

## 2024-09-20 RX ORDER — HEPARIN SODIUM 1000 [USP'U]/ML
INJECTION, SOLUTION INTRAVENOUS; SUBCUTANEOUS PRN
Status: COMPLETED | OUTPATIENT
Start: 2024-09-20 | End: 2024-09-20

## 2024-09-20 RX ORDER — MIDAZOLAM HYDROCHLORIDE 1 MG/ML
1 INJECTION INTRAMUSCULAR; INTRAVENOUS ONCE
Status: COMPLETED | OUTPATIENT
Start: 2024-09-20 | End: 2024-09-20

## 2024-09-20 RX ORDER — METHYLPREDNISOLONE SODIUM SUCCINATE 125 MG/2ML
INJECTION, POWDER, LYOPHILIZED, FOR SOLUTION INTRAMUSCULAR; INTRAVENOUS PRN
Status: COMPLETED | OUTPATIENT
Start: 2024-09-20 | End: 2024-09-20

## 2024-09-20 RX ORDER — LIDOCAINE 40 MG/G
CREAM TOPICAL ONCE
Status: COMPLETED | OUTPATIENT
Start: 2024-09-20 | End: 2024-09-20

## 2024-09-20 RX ORDER — DIPHENHYDRAMINE HYDROCHLORIDE 50 MG/ML
INJECTION INTRAMUSCULAR; INTRAVENOUS PRN
Status: COMPLETED | OUTPATIENT
Start: 2024-09-20 | End: 2024-09-20

## 2024-09-20 RX ORDER — SODIUM CHLORIDE 450 MG/100ML
INJECTION, SOLUTION INTRAVENOUS CONTINUOUS
Status: DISCONTINUED | OUTPATIENT
Start: 2024-09-20 | End: 2024-09-21 | Stop reason: HOSPADM

## 2024-09-20 RX ORDER — IOPAMIDOL 612 MG/ML
INJECTION, SOLUTION INTRAVASCULAR PRN
Status: COMPLETED | OUTPATIENT
Start: 2024-09-20 | End: 2024-09-20

## 2024-09-20 RX ORDER — MIDAZOLAM HYDROCHLORIDE 1 MG/ML
INJECTION INTRAMUSCULAR; INTRAVENOUS PRN
Status: COMPLETED | OUTPATIENT
Start: 2024-09-20 | End: 2024-09-20

## 2024-09-20 RX ADMIN — SODIUM CHLORIDE: 4.5 INJECTION, SOLUTION INTRAVENOUS at 07:27

## 2024-09-20 RX ADMIN — HYDROMORPHONE HYDROCHLORIDE 1 MG: 1 INJECTION, SOLUTION INTRAMUSCULAR; INTRAVENOUS; SUBCUTANEOUS at 08:39

## 2024-09-20 RX ADMIN — DIPHENHYDRAMINE HYDROCHLORIDE 50 MG: 50 INJECTION, SOLUTION INTRAMUSCULAR; INTRAVENOUS at 08:06

## 2024-09-20 RX ADMIN — IOPAMIDOL 20 ML: 612 INJECTION, SOLUTION INTRAVENOUS at 09:00

## 2024-09-20 RX ADMIN — HEPARIN SODIUM 3000 UNITS: 1000 INJECTION INTRAVENOUS; SUBCUTANEOUS at 08:48

## 2024-09-20 RX ADMIN — HYDROMORPHONE HYDROCHLORIDE 1 MG: 1 INJECTION, SOLUTION INTRAMUSCULAR; INTRAVENOUS; SUBCUTANEOUS at 08:52

## 2024-09-20 RX ADMIN — HYDROMORPHONE HYDROCHLORIDE 1 MG: 1 INJECTION, SOLUTION INTRAMUSCULAR; INTRAVENOUS; SUBCUTANEOUS at 08:32

## 2024-09-20 RX ADMIN — VANCOMYCIN HYDROCHLORIDE 500 MG: 500 INJECTION, POWDER, LYOPHILIZED, FOR SOLUTION INTRAVENOUS at 07:40

## 2024-09-20 RX ADMIN — METHYLPREDNISOLONE SODIUM SUCCINATE 125 MG: 125 INJECTION INTRAMUSCULAR; INTRAVENOUS at 08:03

## 2024-09-20 RX ADMIN — MIDAZOLAM 1 MG: 1 INJECTION INTRAMUSCULAR; INTRAVENOUS at 08:31

## 2024-09-20 RX ADMIN — LIDOCAINE 4%: 4 CREAM TOPICAL at 07:29

## 2024-09-20 RX ADMIN — MIDAZOLAM 1 MG: 1 INJECTION INTRAMUSCULAR; INTRAVENOUS at 08:39

## 2024-09-20 ASSESSMENT — PAIN - FUNCTIONAL ASSESSMENT: PAIN_FUNCTIONAL_ASSESSMENT: 0-10

## 2024-09-26 NOTE — TELEPHONE ENCOUNTER
Spoke with patient. Doing well. Cath site free of lump, redness, bruising, just a little sore.    Denies any needs at this time Statement Selected

## 2024-09-30 ENCOUNTER — TELEPHONE (OUTPATIENT)
Dept: INTERNAL MEDICINE CLINIC | Age: 64
End: 2024-09-30

## 2024-09-30 RX ORDER — MONTELUKAST SODIUM 10 MG/1
10 TABLET ORAL NIGHTLY
Qty: 90 TABLET | Refills: 3 | Status: SHIPPED | OUTPATIENT
Start: 2024-09-30

## 2024-09-30 NOTE — TELEPHONE ENCOUNTER
Nurse called from [facility] stating that Farhad is not being compliant with his dialysis . Patient onlt did dialysis twice last week and once this week . Nurse was concerned about worsening depression . Informed nurse that we had seen in follow-up from the hospital and depression medications were adjusted . Patient is to follow up in 6 weeks 10/30 . Dr. Fischer was wanting your input . Do you want to patient seen sooner ?

## 2024-10-01 NOTE — TELEPHONE ENCOUNTER
Jeremy the occupational therapist called stating that patient was seen today and was complaining of some dizziness . BP reclining was 120/80 and when patient sat up in the chair /100 . Patient told therapist BP has  been increased due to new medication he has been on this week .     Tosin the home health nurse call later after OT was there and BP was 158/78 . Nurse states that she could not see that patient is on any BP medication .

## 2024-10-02 RX ORDER — MIRTAZAPINE 7.5 MG/1
7.5 TABLET, FILM COATED ORAL NIGHTLY
Qty: 30 TABLET | Refills: 0 | Status: SHIPPED | OUTPATIENT
Start: 2024-10-02

## 2024-10-02 NOTE — TELEPHONE ENCOUNTER
Zoie - also confirm he is taking Norvasc, and beta-blocker - HH stated in your note they didn't see BP meds on list and I didn't ask him about it when I was on phone with him.

## 2024-10-02 NOTE — TELEPHONE ENCOUNTER
I will be more than happy to get this patient set up for injections in his knees and low back as discussed previously. He can be seen in a sooner follow up if needed.

## 2024-10-02 NOTE — TELEPHONE ENCOUNTER
Spoke with patient and went over the instruction to take the Sertraline 150 mg in the morning and Remeron ( Mirtazapine )7.5 at night .if nit sleeping better in a week notify the office as Dr. Crespo can increase the Remeron . The sleep medication can increase appetite for cars so try to eat health snacks like veggies or berries. Patient verbalized understanding .

## 2024-10-02 NOTE — TELEPHONE ENCOUNTER
I had a long telephone conversation with patient Re: depression, current symptoms, not dialyzing as often at home and issues at home currently.    He now has a water leak from roof into room with dialysis equipment - they have used a  to get water out of rug/called the marisabel heriberto who supposedly fixed it over summer to come back - but may be an issue with dialysis.  He states he is planning on doing dialysis 3x a week as he has been instructed.  He is going to do M/W/F.      Elevated BP - could be from Effexor, not using Bipap enough due to insomnia, or not dialyzing like he was told to.  BP while on dialysis currently is 150's/80's.  Usually Effexor doesn't cause hypertension at low dose but with this side effect happening - will stop Effexor and go back to SSRI.    Depression/Insomnia/Pain - He is worried about dying, chronic pain, not sleeping due to thoughts not calming down at night.  Wakes up in middle of night and hard to get back to sleep.  At this point - my focus is on getting him sleep to better deal with emotions/wear Bipap - will try Remeron (too high risk with cardiac issues to start trazodone or doxepin).  I don't like Remeron as will likely cause weight gain but willing to try it at least in short term.  I did strongly suggest he consider other options than pain pills to deal with back and knee pain.  He has been against procedures pain clinic could do for his back - but I strongly encouraged him to try again as he only did one injection in past and didn't help his pain - told him they may need to try several spots to get the response he needs - then consider ablation for more long term success.  Also discussed steroid injections/Synvisc injections in knees (he is against TKA) - previous left knee x-ray significant OA- ask [facility] if will do steroid injection in knees in pain clinic.    Lightheadedness, nausea, headache - could be due to serotonin withdrawal- will go back to sertraline 150

## 2024-10-07 RX ORDER — IPRATROPIUM BROMIDE AND ALBUTEROL SULFATE 2.5; .5 MG/3ML; MG/3ML
SOLUTION RESPIRATORY (INHALATION)
Qty: 360 ML | Refills: 0 | Status: SHIPPED | OUTPATIENT
Start: 2024-10-07

## 2024-10-07 RX ORDER — MIRTAZAPINE 7.5 MG/1
7.5 TABLET, FILM COATED ORAL NIGHTLY
Qty: 30 TABLET | Refills: 0 | OUTPATIENT
Start: 2024-10-07

## 2024-10-08 ENCOUNTER — HOSPITAL ENCOUNTER (OUTPATIENT)
Age: 64
Discharge: HOME | End: 2024-10-08
Payer: MEDICARE

## 2024-10-08 DIAGNOSIS — R94.2 DECREASED DIFFUSION CAPACITY OF LUNG: ICD-10-CM

## 2024-10-08 DIAGNOSIS — R97.20 ELEVATED PSA: ICD-10-CM

## 2024-10-08 DIAGNOSIS — E03.9 HYPOTHYROIDISM, UNSPECIFIED TYPE: ICD-10-CM

## 2024-10-08 DIAGNOSIS — J98.4 RESTRICTIVE LUNG DISEASE: ICD-10-CM

## 2024-10-08 DIAGNOSIS — R91.8 GROUND GLASS OPACITY PRESENT ON IMAGING OF LUNG: ICD-10-CM

## 2024-10-08 DIAGNOSIS — R93.89 ABNORMAL CT OF THE CHEST: ICD-10-CM

## 2024-10-08 DIAGNOSIS — J96.11 CHRONIC RESPIRATORY FAILURE WITH HYPOXIA: ICD-10-CM

## 2024-10-08 LAB
T4 FREE SERPL-MCNC: 0.82 NG/DL (ref 0.93–1.68)
TSH SERPL DL<=0.005 MIU/L-ACNC: 22.16 UIU/ML (ref 0.4–4.2)

## 2024-10-08 PROCEDURE — 86038 ANTINUCLEAR ANTIBODIES: CPT

## 2024-10-08 PROCEDURE — 86430 RHEUMATOID FACTOR TEST QUAL: CPT

## 2024-10-08 PROCEDURE — 36415 COLL VENOUS BLD VENIPUNCTURE: CPT

## 2024-10-08 PROCEDURE — 84439 ASSAY OF FREE THYROXINE: CPT

## 2024-10-08 PROCEDURE — 84153 ASSAY OF PSA TOTAL: CPT

## 2024-10-08 PROCEDURE — 86039 ANTINUCLEAR ANTIBODIES (ANA): CPT

## 2024-10-08 PROCEDURE — 84443 ASSAY THYROID STIM HORMONE: CPT

## 2024-10-08 PROCEDURE — 86255 FLUORESCENT ANTIBODY SCREEN: CPT

## 2024-10-09 ENCOUNTER — OFFICE VISIT (OUTPATIENT)
Dept: PHYSICAL MEDICINE AND REHAB | Age: 64
End: 2024-10-09
Payer: MEDICARE

## 2024-10-09 VITALS
DIASTOLIC BLOOD PRESSURE: 80 MMHG | SYSTOLIC BLOOD PRESSURE: 126 MMHG | HEIGHT: 73 IN | WEIGHT: 285.27 LBS | BODY MASS INDEX: 37.81 KG/M2

## 2024-10-09 DIAGNOSIS — G62.9 NEUROPATHY: ICD-10-CM

## 2024-10-09 DIAGNOSIS — G89.4 CHRONIC PAIN SYNDROME: ICD-10-CM

## 2024-10-09 DIAGNOSIS — M47.816 SPONDYLOSIS OF LUMBAR REGION WITHOUT MYELOPATHY OR RADICULOPATHY: ICD-10-CM

## 2024-10-09 DIAGNOSIS — M17.0 PRIMARY OSTEOARTHRITIS OF BOTH KNEES: Primary | ICD-10-CM

## 2024-10-09 DIAGNOSIS — M48.062 SPINAL STENOSIS OF LUMBAR REGION WITH NEUROGENIC CLAUDICATION: ICD-10-CM

## 2024-10-09 DIAGNOSIS — M25.562 CHRONIC PAIN OF BOTH KNEES: ICD-10-CM

## 2024-10-09 DIAGNOSIS — G89.29 CHRONIC BILATERAL LOW BACK PAIN WITHOUT SCIATICA: ICD-10-CM

## 2024-10-09 DIAGNOSIS — M47.816 LUMBAR FACET ARTHROPATHY: ICD-10-CM

## 2024-10-09 DIAGNOSIS — M25.561 CHRONIC PAIN OF BOTH KNEES: ICD-10-CM

## 2024-10-09 DIAGNOSIS — M54.50 CHRONIC BILATERAL LOW BACK PAIN WITHOUT SCIATICA: ICD-10-CM

## 2024-10-09 DIAGNOSIS — I50.32 CHF (CONGESTIVE HEART FAILURE), NYHA CLASS II, CHRONIC, DIASTOLIC (HCC): ICD-10-CM

## 2024-10-09 DIAGNOSIS — G89.29 CHRONIC PAIN OF BOTH KNEES: ICD-10-CM

## 2024-10-09 LAB
PSA SERPL-MCNC: 0.99 NG/ML (ref 0–1)
RHEUMATOID FACT SERPL-ACNC: < 10 IU/ML (ref 0–13)

## 2024-10-09 PROCEDURE — 3074F SYST BP LT 130 MM HG: CPT | Performed by: NURSE PRACTITIONER

## 2024-10-09 PROCEDURE — 99214 OFFICE O/P EST MOD 30 MIN: CPT | Performed by: NURSE PRACTITIONER

## 2024-10-09 PROCEDURE — G8417 CALC BMI ABV UP PARAM F/U: HCPCS | Performed by: NURSE PRACTITIONER

## 2024-10-09 PROCEDURE — 3017F COLORECTAL CA SCREEN DOC REV: CPT | Performed by: NURSE PRACTITIONER

## 2024-10-09 PROCEDURE — 3079F DIAST BP 80-89 MM HG: CPT | Performed by: NURSE PRACTITIONER

## 2024-10-09 PROCEDURE — 4004F PT TOBACCO SCREEN RCVD TLK: CPT | Performed by: NURSE PRACTITIONER

## 2024-10-09 PROCEDURE — G8427 DOCREV CUR MEDS BY ELIG CLIN: HCPCS | Performed by: NURSE PRACTITIONER

## 2024-10-09 PROCEDURE — G8484 FLU IMMUNIZE NO ADMIN: HCPCS | Performed by: NURSE PRACTITIONER

## 2024-10-09 RX ORDER — OXYCODONE AND ACETAMINOPHEN 10; 325 MG/1; MG/1
1 TABLET ORAL EVERY 8 HOURS PRN
Qty: 90 TABLET | Refills: 0 | Status: SHIPPED | OUTPATIENT
Start: 2024-10-12 | End: 2024-11-11

## 2024-10-09 ASSESSMENT — ENCOUNTER SYMPTOMS
BACK PAIN: 1
APNEA: 1
ABDOMINAL PAIN: 0
COUGH: 1
GASTROINTESTINAL NEGATIVE: 1
WHEEZING: 0
CONSTIPATION: 0
STRIDOR: 0
CHOKING: 0
DIARRHEA: 0
SHORTNESS OF BREATH: 1
CHEST TIGHTNESS: 0

## 2024-10-09 NOTE — PROGRESS NOTES
[facility]  [address] [place] [address] [address] [place] [id number]  Dept: [phone]  Dept Fax: [phone]  Loc: [phone]    Visit Date: 10/9/2024    Functionality Assessment/Goals Worksheet     On a scale of 0 (Does not Interfere) to 10 (Completely Interferes)     1.  Which number describes how during the past week pain has interfered with       the following:  A.  General Activity:  10  B.  Mood: 8  C.  Walking Ability:  9  D.  Normal Work (Includes both work outside the home and housework):  10  E.  Relations with Other People:   8  F.  Sleep:   9  G.  Enjoyment of Life:   10    2.  Patient Prefers to Take their Pain Medications:     [x]  On a regular basis   [x]  Only when necessary    []  Does not take pain medications    3.  What are the Patient's Goals/Expectations for Visiting Pain Management?     []  Learn about my pain    [x]  Receive Medication   [x]  Physical Therapy     []  Treat Depression   [x]  Receive Injections    []  Treat Sleep   []  Deal with Anxiety and Stress   []  Treat Opoid Dependence/Addiction   []  Other:      HPI:   Farhad Myles is a 64 y.o. male is here today for    Chief Complaint: Knee pain, low back pain     HPI   Almost 2 months since last follow up here to talk about injections. Farhad continues to have a main complaint of bilateral knee pain and low back pain.     Bilateral knee pain- constant aching and sometimes sharp and worse with walking and activity.      Low back pain starts in center and is all across axially aching pain and sometimes sharp and throbbing.     Also having aching pain in bilateral feet and hands.     Current pain medications with Percocet prn and Neurontin remains effective in decreasing pain to a tolerable level.       Ambulating with a quad cane is on 2-4 liters of oxygen at this time   Pain increases with bending, lifting, twisting , walking, standing, getting up and

## 2024-10-09 NOTE — PROGRESS NOTES
History  Farhad  reports that he has never smoked. His smokeless tobacco use includes chew. He reports that he does not drink alcohol and does not use drugs.      Subjective:      REVIEW OF SYSTEMS:  Constitutional: negative  Eyes: negative  Respiratory: negative  Cardiovascular: negative  Gastrointestinal: negative  Musculoskeletal: negative  Genitourinary: negative except for what is in HPI  Skin: negative   Neurological: negative  Hematological/Lymphatic: negative  Psychological: negative    Objective:   Resp 22   Ht 1.854 m (6' 1\")   Wt 129.3 kg (285 lb)   BMI 37.60 kg/m²     Patient is a 64 y.o. male in no acute distress and alert and oriented to person, place and time.    Pulmonary: Non-labored respiration.  Cardiovascular: Normal rate and regular rhythm  Skin: Warm and dry.  Psych: Normal mood and affect.  Genitourinary: Bladder non-distended and non-tender.    POC  No results found for this visit on 10/10/24.      Patients recent PSA values are as follows  Lab Results   Component Value Date    PSA 0.99 10/08/2024    PSA 2.22 (H) 04/09/2024    PSA 1.24 (H) 04/07/2022        Recent BUN/Creatinine:  Lab Results   Component Value Date/Time    BUN 35 09/09/2024 04:33 PM    CREATININE 4.3 09/09/2024 04:33 PM       Radiology  The patient has had a CT Without Contrast which I have independently reviewed along with its accompanying report.  The study demonstrates:    Findings:  Partially visualized and partially organized fluid collection in the right   upper abdominal wall and flank region extending superiorly into the lower   right chest superficial soft tissues. This is consistent with   predominantly pooling edema in the setting of severe diffuse anasarca.   Some areas of increased density within the collection suggest that there   is likely an acute traumatic hematoma component.     No acute fracture demonstrated. Visualized portions of the inferior ribs   are intact. No pelvic or proximal femur fracture.

## 2024-10-10 ENCOUNTER — TELEPHONE (OUTPATIENT)
Dept: INTERNAL MEDICINE CLINIC | Age: 64
End: 2024-10-10

## 2024-10-10 ENCOUNTER — OFFICE VISIT (OUTPATIENT)
Dept: UROLOGY | Age: 64
End: 2024-10-10
Payer: MEDICARE

## 2024-10-10 VITALS — RESPIRATION RATE: 22 BRPM | BODY MASS INDEX: 37.77 KG/M2 | HEIGHT: 73 IN | WEIGHT: 285 LBS

## 2024-10-10 DIAGNOSIS — N40.1 BENIGN PROSTATIC HYPERPLASIA WITH URINARY FREQUENCY: Primary | ICD-10-CM

## 2024-10-10 DIAGNOSIS — R35.0 BENIGN PROSTATIC HYPERPLASIA WITH URINARY FREQUENCY: Primary | ICD-10-CM

## 2024-10-10 DIAGNOSIS — N47.1 PHIMOSIS: ICD-10-CM

## 2024-10-10 DIAGNOSIS — R31.0 GROSS HEMATURIA: ICD-10-CM

## 2024-10-10 DIAGNOSIS — R97.20 ELEVATED PSA: ICD-10-CM

## 2024-10-10 LAB — NUCLEAR IGG SER QL IA: DETECTED

## 2024-10-10 PROCEDURE — G8427 DOCREV CUR MEDS BY ELIG CLIN: HCPCS | Performed by: NURSE PRACTITIONER

## 2024-10-10 PROCEDURE — 3017F COLORECTAL CA SCREEN DOC REV: CPT | Performed by: NURSE PRACTITIONER

## 2024-10-10 PROCEDURE — 4004F PT TOBACCO SCREEN RCVD TLK: CPT | Performed by: NURSE PRACTITIONER

## 2024-10-10 PROCEDURE — G8484 FLU IMMUNIZE NO ADMIN: HCPCS | Performed by: NURSE PRACTITIONER

## 2024-10-10 PROCEDURE — 99214 OFFICE O/P EST MOD 30 MIN: CPT | Performed by: NURSE PRACTITIONER

## 2024-10-10 PROCEDURE — G8417 CALC BMI ABV UP PARAM F/U: HCPCS | Performed by: NURSE PRACTITIONER

## 2024-10-10 RX ORDER — TAMSULOSIN HYDROCHLORIDE 0.4 MG/1
0.4 CAPSULE ORAL 2 TIMES DAILY
Qty: 90 CAPSULE | Refills: 3 | Status: SHIPPED | OUTPATIENT
Start: 2024-10-10 | End: 2025-01-08

## 2024-10-10 NOTE — TELEPHONE ENCOUNTER
Occupational therapist called stating that patient has a fall in Tuesday . Patient got up out of his chair , got dizzy and fell . Wife was able to help him up . Ni injuries , therapist states that virals were normal today .

## 2024-10-10 NOTE — PATIENT INSTRUCTIONS
Increase Flomax to two times a day. Call office if dizziness or low blood pressure.     Call sooner with any questions or concerns.

## 2024-10-11 LAB
ANCA AB PATTERN SER IF-IMP: ABNORMAL
ANCA IGG TITR SER IF: ABNORMAL {TITER}

## 2024-10-12 LAB
ANA PAT SER IF-IMP: ABNORMAL
ANA PAT SER IF-IMP: ABNORMAL
NUCLEAR IGG SER QL IF: DETECTED
NUCLEAR IGG TITR SER IF: ABNORMAL {TITER}

## 2024-10-13 ENCOUNTER — TELEPHONE (OUTPATIENT)
Dept: PULMONOLOGY | Age: 64
End: 2024-10-13

## 2024-10-13 DIAGNOSIS — R91.8 GROUND GLASS OPACITY PRESENT ON IMAGING OF LUNG: ICD-10-CM

## 2024-10-13 DIAGNOSIS — R76.8 POSITIVE ANA (ANTINUCLEAR ANTIBODY): ICD-10-CM

## 2024-10-13 DIAGNOSIS — R93.89 ABNORMAL CT OF THE CHEST: ICD-10-CM

## 2024-10-13 DIAGNOSIS — R94.2 DECREASED DIFFUSION CAPACITY OF LUNG: ICD-10-CM

## 2024-10-13 DIAGNOSIS — R76.8 ABNORMAL ANCA (ANTINEUTROPHIL CYTOPLASMIC ANTIBODY): Primary | ICD-10-CM

## 2024-10-14 NOTE — TELEPHONE ENCOUNTER
Please notify patient his autoimmune work up was abnormal. I have placed a referral to rheumatology. They will call him to set up an appt for further evaluation/work up. He may keep his pulm appt as scheduled in Dec 2024. Thanks!

## 2024-10-16 ENCOUNTER — OFFICE VISIT (OUTPATIENT)
Dept: PHYSICAL MEDICINE AND REHAB | Age: 64
End: 2024-10-16
Payer: MEDICARE

## 2024-10-16 VITALS
DIASTOLIC BLOOD PRESSURE: 76 MMHG | SYSTOLIC BLOOD PRESSURE: 138 MMHG | WEIGHT: 285 LBS | BODY MASS INDEX: 37.77 KG/M2 | HEART RATE: 82 BPM | HEIGHT: 73 IN

## 2024-10-16 DIAGNOSIS — M17.0 PRIMARY OSTEOARTHRITIS OF BOTH KNEES: Primary | ICD-10-CM

## 2024-10-16 PROCEDURE — 20610 DRAIN/INJ JOINT/BURSA W/O US: CPT | Performed by: NURSE PRACTITIONER

## 2024-10-16 RX ORDER — TRIAMCINOLONE ACETONIDE 40 MG/ML
60 INJECTION, SUSPENSION INTRA-ARTICULAR; INTRAMUSCULAR ONCE
Status: COMPLETED | OUTPATIENT
Start: 2024-10-16 | End: 2024-10-16

## 2024-10-16 RX ADMIN — TRIAMCINOLONE ACETONIDE 60 MG: 40 INJECTION, SUSPENSION INTRA-ARTICULAR; INTRAMUSCULAR at 10:52

## 2024-10-16 NOTE — PROGRESS NOTES
Physical Medicine and Rehabilitation Pain Management   Procedure Note    Verbal consent was obtained for a corticosteroid injection of the left knee.  Risks versus benefits were discussed before the injection was performed.  3.5ml 0.5% Bupivacaine and 1.5 ml/60 mg Kenalog were combined in a syringe.  The injection site was cleansed with alcohol and povidone-iodine.  The solution was then injected into the knee using a superior-media or lateral approach.  Ultrasound guidance was not used during procedure.        Procedure:  The patient is placed in supine/sitting position. The Left knee was flexed to about 90 degrees. Skin over the Left knee was prepped with Betadine and draped in sterile manner. The #22-gauge 3-1/2 inch spinal needle was introduced through the skin wheal. Then the needle is directed parallel to the tibial plateau and slightly medial direction into the knee joint.  Then after negative aspiration a total of 3.5ml 0.5% Bupivacaine and 1.5 ml/60 mg Kenalog were combined in a syringe. were injected into the joint. The needle is removed and a Band-Aid was placed over the needle insertion site.    The patient tolerated the procedure well with no immediate complications.  The patient should call with concerns or questions over the coming days.       FINDINGS: LEFT KNEE   Moderate to severe tricompartmental joint space narrowing and marginal spurring is most pronounced in the patellofemoral compartment. No fracture or joint effusion is observed. Diffuse osteopenia is visualized. No focal bony lesions are present.     The visualized soft tissues are unremarkable.     IMPRESSION:  Moderate to severe chronic arthritic changes are noted. No fracture or acute bony abnormality is visualized.         right knee      FINDINGS: No fracture or dislocation is seen. There is no foreign body. There is moderately severe narrowing of the medial tibiofemoral joint compartment. There is moderate spurring of the knee medially

## 2024-10-30 ENCOUNTER — OFFICE VISIT (OUTPATIENT)
Dept: INTERNAL MEDICINE CLINIC | Age: 64
End: 2024-10-30

## 2024-10-30 VITALS
HEART RATE: 61 BPM | OXYGEN SATURATION: 98 % | SYSTOLIC BLOOD PRESSURE: 148 MMHG | BODY MASS INDEX: 38.17 KG/M2 | DIASTOLIC BLOOD PRESSURE: 85 MMHG | WEIGHT: 288 LBS | HEIGHT: 73 IN

## 2024-10-30 DIAGNOSIS — I50.9 CHF (NYHA CLASS III, ACC/AHA STAGE C) (HCC): ICD-10-CM

## 2024-10-30 DIAGNOSIS — I10 ESSENTIAL HYPERTENSION: ICD-10-CM

## 2024-10-30 DIAGNOSIS — F32.9 MAJOR DEPRESSIVE DISORDER WITH CURRENT ACTIVE EPISODE, UNSPECIFIED DEPRESSION EPISODE SEVERITY, UNSPECIFIED WHETHER RECURRENT: ICD-10-CM

## 2024-10-30 DIAGNOSIS — E03.9 HYPOTHYROIDISM, UNSPECIFIED TYPE: ICD-10-CM

## 2024-10-30 DIAGNOSIS — E78.2 MIXED HYPERLIPIDEMIA: ICD-10-CM

## 2024-10-30 DIAGNOSIS — N18.6 ESRD (END STAGE RENAL DISEASE) (HCC): Primary | ICD-10-CM

## 2024-10-30 RX ORDER — LEVOTHYROXINE SODIUM 200 UG/1
200 TABLET ORAL DAILY
Qty: 30 TABLET | Refills: 0 | Status: SHIPPED | OUTPATIENT
Start: 2024-10-30

## 2024-10-30 RX ORDER — VENLAFAXINE HYDROCHLORIDE 75 MG/1
150 CAPSULE, EXTENDED RELEASE ORAL DAILY
Qty: 180 CAPSULE | Refills: 1 | Status: SHIPPED | OUTPATIENT
Start: 2024-10-30 | End: 2025-04-28

## 2024-10-30 RX ORDER — AMLODIPINE BESYLATE 10 MG/1
10 TABLET ORAL DAILY
Qty: 90 TABLET | Refills: 1 | Status: SHIPPED | OUTPATIENT
Start: 2024-10-30

## 2024-10-30 RX ORDER — VENLAFAXINE HYDROCHLORIDE 75 MG/1
150 CAPSULE, EXTENDED RELEASE ORAL DAILY
Qty: 30 CAPSULE | Refills: 0 | Status: SHIPPED | OUTPATIENT
Start: 2024-10-30 | End: 2024-11-29

## 2024-10-30 RX ORDER — MIRTAZAPINE 7.5 MG/1
7.5 TABLET, FILM COATED ORAL NIGHTLY
Qty: 30 TABLET | Refills: 0 | Status: SHIPPED | OUTPATIENT
Start: 2024-10-30

## 2024-10-30 RX ORDER — ATORVASTATIN CALCIUM 40 MG/1
40 TABLET, FILM COATED ORAL DAILY
Qty: 30 TABLET | Refills: 0 | Status: SHIPPED | OUTPATIENT
Start: 2024-10-30

## 2024-10-30 RX ORDER — BUMETANIDE 2 MG/1
1 TABLET ORAL
COMMUNITY
Start: 2024-10-23

## 2024-10-30 RX ORDER — LEVOTHYROXINE SODIUM 200 UG/1
200 TABLET ORAL DAILY
Qty: 90 TABLET | Refills: 1 | Status: SHIPPED | OUTPATIENT
Start: 2024-10-30

## 2024-10-30 RX ORDER — ATORVASTATIN CALCIUM 40 MG/1
40 TABLET, FILM COATED ORAL DAILY
Qty: 90 TABLET | Refills: 1 | Status: SHIPPED | OUTPATIENT
Start: 2024-10-30

## 2024-10-30 RX ORDER — AMLODIPINE BESYLATE 10 MG/1
10 TABLET ORAL DAILY
Qty: 30 TABLET | Refills: 0 | Status: SHIPPED | OUTPATIENT
Start: 2024-10-30

## 2024-10-30 RX ORDER — MIRTAZAPINE 7.5 MG/1
7.5 TABLET, FILM COATED ORAL NIGHTLY
Qty: 90 TABLET | Refills: 1 | Status: SHIPPED | OUTPATIENT
Start: 2024-11-28

## 2024-10-30 NOTE — PROGRESS NOTES
PHYSICAL EXAMINATION     Vitals:    10/30/24 1354   BP: (!) 148/85   Pulse:    SpO2:        Body mass index is 38.01 kg/m².     Physical Exam  Constitutional:       General: He is not in acute distress.     Appearance: He is obese. He is not ill-appearing, toxic-appearing or diaphoretic.   HENT:      Head: Normocephalic and atraumatic.      Right Ear: External ear normal.      Left Ear: External ear normal.      Nose: Nose normal.      Mouth/Throat:      Pharynx: No oropharyngeal exudate or posterior oropharyngeal erythema.   Eyes:      General: No scleral icterus.     Extraocular Movements: Extraocular movements intact.      Pupils: Pupils are equal, round, and reactive to light.   Cardiovascular:      Rate and Rhythm: Normal rate and regular rhythm.      Pulses: Normal pulses.      Heart sounds: No murmur heard.     No friction rub. No gallop.   Pulmonary:      Effort: No respiratory distress.      Breath sounds: No wheezing or rales.   Chest:      Chest wall: No tenderness.   Abdominal:      General: Bowel sounds are normal. There is no distension.      Tenderness: There is no abdominal tenderness. There is no right CVA tenderness, left CVA tenderness, guarding or rebound.   Musculoskeletal:      Right lower leg: Edema present.      Left lower leg: Edema present.   Skin:     Capillary Refill: Capillary refill takes less than 2 seconds.      Coloration: Skin is not jaundiced.      Findings: No bruising, lesion or rash.   Neurological:      General: No focal deficit present.      Mental Status: He is alert and oriented to person, place, and time. Mental status is at baseline.      Cranial Nerves: No cranial nerve deficit.      Sensory: No sensory deficit.      Motor: No weakness.      Coordination: Coordination normal.      Gait: Gait normal.   Psychiatric:         Mood and Affect: Mood normal.         Behavior: Behavior normal.         Thought Content: Thought content normal.         Judgment: Judgment

## 2024-10-30 NOTE — PATIENT INSTRUCTIONS
Sent refills to pharmacies (both local and mail in pharmacy)  Continue dialysis sessions and following up with other providers  Take medications as prescribed  Follow up with this office in 3 months  Can follow up sooner if necessary.

## 2024-11-08 DIAGNOSIS — G25.81 RLS (RESTLESS LEGS SYNDROME): ICD-10-CM

## 2024-11-08 DIAGNOSIS — G25.2 ACTION TREMOR: ICD-10-CM

## 2024-11-08 RX ORDER — PRIMIDONE 50 MG/1
TABLET ORAL
Qty: 270 TABLET | Refills: 3 | Status: SHIPPED | OUTPATIENT
Start: 2024-11-08

## 2024-11-09 DIAGNOSIS — F32.9 MAJOR DEPRESSIVE DISORDER WITH CURRENT ACTIVE EPISODE, UNSPECIFIED DEPRESSION EPISODE SEVERITY, UNSPECIFIED WHETHER RECURRENT: ICD-10-CM

## 2024-11-11 RX ORDER — VENLAFAXINE HYDROCHLORIDE 75 MG/1
150 CAPSULE, EXTENDED RELEASE ORAL DAILY
Qty: 180 CAPSULE | Refills: 1 | OUTPATIENT
Start: 2024-11-11

## 2024-11-13 NOTE — PROGRESS NOTES
LAPAROTOMY CELIOTOMY W/WO BIOPSY SPX N/A 2/5/2018    ABDOMINAL EXPLORATION WITH LYSIS OF COMPLICATED ADHESIONS WITH AN EXTENDED RIGHT COLON RESECTION performed by Patrick Bond MD at [facility] OR    UPPER GASTROINTESTINAL ENDOSCOPY N/A 7/7/2023    EGD performed by Florentino Mccormack MD at [facility] Endoscopy     SOCIAL HISTORY:  Social History     Tobacco Use    Smoking status: Never    Smokeless tobacco: Current     Types: Chew    Tobacco comments:     quit pipe over 30 yrs ago   Vaping Use    Vaping status: Never Used   Substance Use Topics    Alcohol use: No     Comment: quit 2017    Drug use: No     ALLERGIES:  Allergies   Allergen Reactions    Azithromycin Itching     Hands swell and blister      Shellfish-Derived Products Swelling     Tolerates IV dye without any problems      Pcn [Penicillins] Itching    Succinylcholine      Pseudocholinesterase Deficiency    Sulfa Antibiotics Itching    Morphine Nausea And Vomiting     \"head swimming, skin crawling\"    Tape [Adhesive Tape] Rash     FAMILY HISTORY:  Family History   Problem Relation Age of Onset    Cancer Mother         breast    Heart Disease Father         bladder, lung    Cancer Father     Stroke Brother     Heart Attack Brother     Prostate Cancer Brother     Arthritis Brother     No Known Problems Brother     No Known Problems Brother     Diabetes Paternal Grandmother     High Blood Pressure Neg Hx      CURRENT MEDICATIONS:  Current Outpatient Medications   Medication Sig Dispense Refill    oxyCODONE-acetaminophen (PERCOCET)  MG per tablet TAKE 1 TABLET BY MOUTH EVERY 8 HOURS AS NEEDED FOR PAIN -MAX 3 TABS/DAY-DNF 10/12/24      primidone (MYSOLINE) 50 MG tablet TAKE 2 TABLETS IN THE MORNING AND 1 TABLET IN EVENING (3 TABLETS TOTAL PER DAY) 270 tablet 3    bumetanide (BUMEX) 2 MG tablet Take 1 tablet by mouth      amLODIPine (NORVASC) 10 MG tablet Take 1 tablet by mouth daily 30 tablet 0    amLODIPine (NORVASC) 10 MG tablet Take 1 tablet by mouth daily 90

## 2024-11-14 ENCOUNTER — OFFICE VISIT (OUTPATIENT)
Dept: PULMONOLOGY | Age: 64
End: 2024-11-14
Payer: MEDICARE

## 2024-11-14 VITALS
BODY MASS INDEX: 38.65 KG/M2 | HEIGHT: 73 IN | DIASTOLIC BLOOD PRESSURE: 80 MMHG | TEMPERATURE: 98.8 F | OXYGEN SATURATION: 96 % | WEIGHT: 291.6 LBS | HEART RATE: 73 BPM | SYSTOLIC BLOOD PRESSURE: 150 MMHG

## 2024-11-14 DIAGNOSIS — G47.09 OTHER INSOMNIA: ICD-10-CM

## 2024-11-14 DIAGNOSIS — G47.33 OSA (OBSTRUCTIVE SLEEP APNEA): Primary | ICD-10-CM

## 2024-11-14 DIAGNOSIS — G25.81 RLS (RESTLESS LEGS SYNDROME): ICD-10-CM

## 2024-11-14 DIAGNOSIS — E66.01 MORBID OBESITY WITH BMI OF 40.0-44.9, ADULT: ICD-10-CM

## 2024-11-14 PROCEDURE — G8427 DOCREV CUR MEDS BY ELIG CLIN: HCPCS

## 2024-11-14 PROCEDURE — 3079F DIAST BP 80-89 MM HG: CPT

## 2024-11-14 PROCEDURE — 99214 OFFICE O/P EST MOD 30 MIN: CPT

## 2024-11-14 PROCEDURE — G8484 FLU IMMUNIZE NO ADMIN: HCPCS

## 2024-11-14 PROCEDURE — 3077F SYST BP >= 140 MM HG: CPT

## 2024-11-14 PROCEDURE — G8417 CALC BMI ABV UP PARAM F/U: HCPCS

## 2024-11-14 PROCEDURE — 3017F COLORECTAL CA SCREEN DOC REV: CPT

## 2024-11-14 PROCEDURE — 4004F PT TOBACCO SCREEN RCVD TLK: CPT

## 2024-11-14 RX ORDER — OXYCODONE AND ACETAMINOPHEN 10; 325 MG/1; MG/1
TABLET ORAL
COMMUNITY
Start: 2024-11-02

## 2024-11-14 ASSESSMENT — ENCOUNTER SYMPTOMS
RHINORRHEA: 1
SHORTNESS OF BREATH: 1
WHEEZING: 0
COUGH: 1

## 2024-11-19 DIAGNOSIS — G47.09 OTHER INSOMNIA: ICD-10-CM

## 2024-11-19 NOTE — TELEPHONE ENCOUNTER
Last ordered 11/10/2023 with 11 refills by [facility]   Last visit 11/14/2024 with [facility]   Next visit 5/15/24 with [facility]

## 2024-11-24 DIAGNOSIS — E78.2 MIXED HYPERLIPIDEMIA: ICD-10-CM

## 2024-11-24 DIAGNOSIS — I10 ESSENTIAL HYPERTENSION: ICD-10-CM

## 2024-11-24 DIAGNOSIS — E03.9 HYPOTHYROIDISM, UNSPECIFIED TYPE: ICD-10-CM

## 2024-11-24 DIAGNOSIS — F32.9 MAJOR DEPRESSIVE DISORDER WITH CURRENT ACTIVE EPISODE, UNSPECIFIED DEPRESSION EPISODE SEVERITY, UNSPECIFIED WHETHER RECURRENT: ICD-10-CM

## 2024-11-29 RX ORDER — ATORVASTATIN CALCIUM 40 MG/1
40 TABLET, FILM COATED ORAL DAILY
Qty: 90 TABLET | Refills: 1 | Status: SHIPPED | OUTPATIENT
Start: 2024-11-29

## 2024-11-29 RX ORDER — AMLODIPINE BESYLATE 10 MG/1
10 TABLET ORAL DAILY
Qty: 90 TABLET | Refills: 1 | Status: SHIPPED | OUTPATIENT
Start: 2024-11-29

## 2024-11-29 RX ORDER — MIRTAZAPINE 7.5 MG/1
7.5 TABLET, FILM COATED ORAL NIGHTLY
Qty: 90 TABLET | Refills: 1 | Status: SHIPPED | OUTPATIENT
Start: 2024-11-29

## 2024-11-29 RX ORDER — LEVOTHYROXINE SODIUM 200 UG/1
200 TABLET ORAL DAILY
Qty: 90 TABLET | Refills: 1 | Status: SHIPPED | OUTPATIENT
Start: 2024-11-29

## 2024-11-29 NOTE — TELEPHONE ENCOUNTER
Look at ordering Lipid panel, TSH - elevated OSEAS, p-ANCA - no rheum appt till 4/2025.  What is he taking psych wise now?

## 2024-12-02 ENCOUNTER — OFFICE VISIT (OUTPATIENT)
Dept: PHYSICAL MEDICINE AND REHAB | Age: 64
End: 2024-12-02
Payer: MEDICARE

## 2024-12-02 VITALS
WEIGHT: 291 LBS | DIASTOLIC BLOOD PRESSURE: 62 MMHG | HEIGHT: 73 IN | SYSTOLIC BLOOD PRESSURE: 148 MMHG | BODY MASS INDEX: 38.57 KG/M2

## 2024-12-02 DIAGNOSIS — M17.0 PRIMARY OSTEOARTHRITIS OF BOTH KNEES: Primary | ICD-10-CM

## 2024-12-02 PROCEDURE — 20610 DRAIN/INJ JOINT/BURSA W/O US: CPT | Performed by: NURSE PRACTITIONER

## 2024-12-02 RX ORDER — TRIAMCINOLONE ACETONIDE 40 MG/ML
60 INJECTION, SUSPENSION INTRA-ARTICULAR; INTRAMUSCULAR ONCE
Status: COMPLETED | OUTPATIENT
Start: 2024-12-02 | End: 2024-12-02

## 2024-12-02 RX ADMIN — TRIAMCINOLONE ACETONIDE 60 MG: 40 INJECTION, SUSPENSION INTRA-ARTICULAR; INTRAMUSCULAR at 11:38

## 2024-12-02 NOTE — PROGRESS NOTES
Physical Medicine and Rehabilitation Pain Management   Procedure Note    Verbal consent was obtained for a corticosteroid injection of the right knee.  Risks versus benefits were discussed before the injection was performed.  3.5ml 0.5% Bupivacaine and 1.5 ml/60 mg Kenalog were combined in a syringe.  The injection site was cleansed with alcohol and povidone-iodine.  The solution was then injected into the knee using a superior-media or lateral approach.  Ultrasound guidance was not used during procedure.        Procedure:  The patient is placed in supine/sitting position. The Right knee was flexed to about 90 degrees. Skin over the Right knee was prepped with Betadine and draped in sterile manner. The #22-gauge 3-1/2 inch spinal needle was introduced through the skin wheal. Then the needle is directed parallel to the tibial plateau and slightly medial direction into the knee joint.  Then after negative aspiration a total of 3.5ml 0.5% Bupivacaine and 1.5 ml/60 mg Kenalog were combined in a syringe. were injected into the joint. The needle is removed and a Band-Aid was placed over the needle insertion site.    The patient tolerated the procedure well with no immediate complications.  The patient should call with concerns or questions over the coming days.    Alma Mendoza, APRN - CNP   Discussed [employer] doesn't cover genicular nerve blocks or RFA or PNS, next step would be gel trial injections but have to be approved to be completed at surgery center for left knee, failed steroid injection        right knee      FINDINGS: No fracture or dislocation is seen. There is no foreign body. There is moderately severe narrowing of the medial tibiofemoral joint compartment. There is moderate spurring of the knee medially and laterally and mild lateral subluxation of the   proximal tibia relative to distal femur. Marked degenerative spurring involves the patellofemoral joint and is moderate spurring along the distal femur and

## 2024-12-05 DIAGNOSIS — G89.4 CHRONIC PAIN SYNDROME: ICD-10-CM

## 2024-12-05 DIAGNOSIS — M47.816 SPONDYLOSIS OF LUMBAR REGION WITHOUT MYELOPATHY OR RADICULOPATHY: ICD-10-CM

## 2024-12-05 DIAGNOSIS — G62.9 NEUROPATHY: ICD-10-CM

## 2024-12-05 DIAGNOSIS — M17.0 PRIMARY OSTEOARTHRITIS OF BOTH KNEES: ICD-10-CM

## 2024-12-05 DIAGNOSIS — M25.562 CHRONIC PAIN OF BOTH KNEES: ICD-10-CM

## 2024-12-05 DIAGNOSIS — M48.062 SPINAL STENOSIS OF LUMBAR REGION WITH NEUROGENIC CLAUDICATION: ICD-10-CM

## 2024-12-05 DIAGNOSIS — M47.816 LUMBAR FACET ARTHROPATHY: ICD-10-CM

## 2024-12-05 DIAGNOSIS — G89.29 CHRONIC PAIN OF BOTH KNEES: ICD-10-CM

## 2024-12-05 DIAGNOSIS — M25.561 CHRONIC PAIN OF BOTH KNEES: ICD-10-CM

## 2024-12-05 RX ORDER — OXYCODONE AND ACETAMINOPHEN 10; 325 MG/1; MG/1
1 TABLET ORAL EVERY 8 HOURS PRN
Qty: 90 TABLET | Refills: 0 | Status: SHIPPED | OUTPATIENT
Start: 2024-12-05 | End: 2025-01-04

## 2024-12-05 NOTE — TELEPHONE ENCOUNTER
OARRS reviewed. UDS: + for  gabapentin, sertraline, phenobarbital,oxycodone.   Last seen: 10/9/2024. Follow-up:   Future Appointments   Date Time Provider Department Center   12/16/2024 11:00 AM Joel Holcomb APRN - CNP N [facility] - [place]   12/20/2024  3:00 PM [facility] PULMONARY FUNCTION ROOM 1 [facility] PFT [facility]   12/30/2024  2:00 PM Rasheed Jade APRN - CNP N Pulm Med [facility]   2/4/2025 11:00 AM Dulce Maria Crespo MD [facility] Physic [facility] E[facility] DEP   3/27/2025  2:00 PM Marco George MD N [facility] Heart [facility] [place]   4/1/2025  3:00 PM Aaliyah Gonzales MD N [facility] Rheum [facility] [place]   4/10/2025  2:30 PM Cally Yang APRN - CNP N [facility] Del U [facility] [place]   5/15/2025  2:40 PM Tiff Pierre APRN - CNP N Pulm Med [facility]

## 2024-12-18 ENCOUNTER — OFFICE VISIT (OUTPATIENT)
Dept: PHYSICAL MEDICINE AND REHAB | Age: 64
End: 2024-12-18
Payer: MEDICARE

## 2024-12-18 VITALS
DIASTOLIC BLOOD PRESSURE: 70 MMHG | WEIGHT: 291.01 LBS | HEIGHT: 73 IN | SYSTOLIC BLOOD PRESSURE: 126 MMHG | BODY MASS INDEX: 38.57 KG/M2

## 2024-12-18 DIAGNOSIS — M54.50 CHRONIC BILATERAL LOW BACK PAIN WITHOUT SCIATICA: ICD-10-CM

## 2024-12-18 DIAGNOSIS — M48.062 SPINAL STENOSIS OF LUMBAR REGION WITH NEUROGENIC CLAUDICATION: ICD-10-CM

## 2024-12-18 DIAGNOSIS — N18.4 CHRONIC KIDNEY DISEASE, STAGE IV (SEVERE) (HCC): ICD-10-CM

## 2024-12-18 DIAGNOSIS — G89.29 CHRONIC PAIN OF BOTH KNEES: ICD-10-CM

## 2024-12-18 DIAGNOSIS — I50.32 CHF (CONGESTIVE HEART FAILURE), NYHA CLASS II, CHRONIC, DIASTOLIC (HCC): ICD-10-CM

## 2024-12-18 DIAGNOSIS — M47.816 LUMBAR FACET ARTHROPATHY: ICD-10-CM

## 2024-12-18 DIAGNOSIS — M17.0 PRIMARY OSTEOARTHRITIS OF BOTH KNEES: ICD-10-CM

## 2024-12-18 DIAGNOSIS — M47.816 SPONDYLOSIS OF LUMBAR REGION WITHOUT MYELOPATHY OR RADICULOPATHY: Primary | ICD-10-CM

## 2024-12-18 DIAGNOSIS — M25.511 CHRONIC RIGHT SHOULDER PAIN: ICD-10-CM

## 2024-12-18 DIAGNOSIS — G62.9 NEUROPATHY: ICD-10-CM

## 2024-12-18 DIAGNOSIS — G89.4 CHRONIC PAIN SYNDROME: ICD-10-CM

## 2024-12-18 DIAGNOSIS — M25.562 CHRONIC PAIN OF BOTH KNEES: ICD-10-CM

## 2024-12-18 DIAGNOSIS — G89.29 CHRONIC BILATERAL LOW BACK PAIN WITHOUT SCIATICA: ICD-10-CM

## 2024-12-18 DIAGNOSIS — M25.561 CHRONIC PAIN OF BOTH KNEES: ICD-10-CM

## 2024-12-18 DIAGNOSIS — G89.29 CHRONIC RIGHT SHOULDER PAIN: ICD-10-CM

## 2024-12-18 PROCEDURE — G8417 CALC BMI ABV UP PARAM F/U: HCPCS | Performed by: NURSE PRACTITIONER

## 2024-12-18 PROCEDURE — 3078F DIAST BP <80 MM HG: CPT | Performed by: NURSE PRACTITIONER

## 2024-12-18 PROCEDURE — G8427 DOCREV CUR MEDS BY ELIG CLIN: HCPCS | Performed by: NURSE PRACTITIONER

## 2024-12-18 PROCEDURE — 99214 OFFICE O/P EST MOD 30 MIN: CPT | Performed by: NURSE PRACTITIONER

## 2024-12-18 PROCEDURE — 3017F COLORECTAL CA SCREEN DOC REV: CPT | Performed by: NURSE PRACTITIONER

## 2024-12-18 PROCEDURE — 4004F PT TOBACCO SCREEN RCVD TLK: CPT | Performed by: NURSE PRACTITIONER

## 2024-12-18 PROCEDURE — 3074F SYST BP LT 130 MM HG: CPT | Performed by: NURSE PRACTITIONER

## 2024-12-18 PROCEDURE — G8484 FLU IMMUNIZE NO ADMIN: HCPCS | Performed by: NURSE PRACTITIONER

## 2024-12-18 ASSESSMENT — ENCOUNTER SYMPTOMS
BACK PAIN: 1
STRIDOR: 0
COUGH: 1
CHOKING: 0
SHORTNESS OF BREATH: 1
GASTROINTESTINAL NEGATIVE: 1
APNEA: 1
ABDOMINAL PAIN: 0
DIARRHEA: 0
CONSTIPATION: 0
WHEEZING: 0
CHEST TIGHTNESS: 0

## 2024-12-18 NOTE — PROGRESS NOTES
[facility] PROFESSIONAL SERVS  [facility] NEUROSCIENCE AND REHABILITATION CENTER  [address] [place] [address] [address]  [place] [id number]  Dept: [phone]  Dept Fax: [phone]  Loc: [phone]    Visit Date: 12/18/2024    Functionality Assessment/Goals Worksheet     On a scale of 0 (Does not Interfere) to 10 (Completely Interferes)     1.  Which number describes how during the past week pain has interfered with       the following:  A.  General Activity:  9  B.  Mood: 8  C.  Walking Ability:  8  D.  Normal Work (Includes both work outside the home and housework):  9  E.  Relations with Other People:   8  F.  Sleep:   8  G.  Enjoyment of Life:   7    2.  Patient Prefers to Take their Pain Medications:     [x]  On a regular basis   [x]  Only when necessary    []  Does not take pain medications    3.  What are the Patient's Goals/Expectations for Visiting Pain Management?     []  Learn about my pain    [x]  Receive Medication   [x]  Physical Therapy     []  Treat Depression   [x]  Receive Injections    []  Treat Sleep   []  Deal with Anxiety and Stress   []  Treat Opoid Dependence/Addiction   []  Other:      HPI:   Farhad Myles is a 64 y.o. male is here today for    Chief Complaint: Knee pain, low back, joint pain     HPI   Follow up from bilateral knee steroid injections completed by Alma Mendoza CNP left knee from 10/16/2024, and right knee from 12/2/2024. States he only received 25% relief from these injections for 3 days to a week. States he continues to have bilateral knee pain constant aching pain worse in left knee still.     Continues to have pain in his low back starts in center and is all across axially aching pain and sometimes sharp and throbbing.   Has joint pains, in his hands, right shoulder and his neck.     He states he is being evaluated in January for possible future kidney transplant. Continues dialysis 4 times per week     Current pain medications with Percocet prn and Neurontin

## 2024-12-20 ENCOUNTER — HOSPITAL ENCOUNTER (OUTPATIENT)
Dept: PULMONOLOGY | Age: 64
Discharge: HOME | End: 2024-12-20
Payer: MEDICARE

## 2024-12-20 DIAGNOSIS — R91.8 GROUND GLASS OPACITY PRESENT ON IMAGING OF LUNG: ICD-10-CM

## 2024-12-20 DIAGNOSIS — R94.2 DECREASED DIFFUSION CAPACITY OF LUNG: ICD-10-CM

## 2024-12-20 DIAGNOSIS — R93.89 ABNORMAL CT OF THE CHEST: ICD-10-CM

## 2024-12-20 DIAGNOSIS — J98.4 RESTRICTIVE LUNG DISEASE: ICD-10-CM

## 2024-12-20 DIAGNOSIS — J96.11 CHRONIC RESPIRATORY FAILURE WITH HYPOXIA: ICD-10-CM

## 2024-12-20 PROCEDURE — 94010 BREATHING CAPACITY TEST: CPT

## 2024-12-20 PROCEDURE — 94726 PLETHYSMOGRAPHY LUNG VOLUMES: CPT

## 2024-12-20 PROCEDURE — 94729 DIFFUSING CAPACITY: CPT

## 2024-12-23 ENCOUNTER — HOSPITAL ENCOUNTER (EMERGENCY)
Age: 64
Discharge: HOME | End: 2024-12-23
Payer: MEDICARE

## 2024-12-23 ENCOUNTER — APPOINTMENT (OUTPATIENT)
Dept: GENERAL RADIOLOGY | Age: 64
End: 2024-12-23
Payer: MEDICARE

## 2024-12-23 ENCOUNTER — TELEPHONE (OUTPATIENT)
Dept: INTERNAL MEDICINE CLINIC | Age: 64
End: 2024-12-23

## 2024-12-23 VITALS
OXYGEN SATURATION: 100 % | RESPIRATION RATE: 20 BRPM | TEMPERATURE: 97.1 F | SYSTOLIC BLOOD PRESSURE: 173 MMHG | DIASTOLIC BLOOD PRESSURE: 87 MMHG | HEART RATE: 78 BPM

## 2024-12-23 DIAGNOSIS — M25.511 ACUTE PAIN OF RIGHT SHOULDER: Primary | ICD-10-CM

## 2024-12-23 DIAGNOSIS — M19.011 OSTEOARTHRITIS OF RIGHT SHOULDER, UNSPECIFIED OSTEOARTHRITIS TYPE: ICD-10-CM

## 2024-12-23 LAB
EKG ATRIAL RATE: 76 BPM
EKG P AXIS: 65 DEGREES
EKG P-R INTERVAL: 184 MS
EKG Q-T INTERVAL: 432 MS
EKG QRS DURATION: 154 MS
EKG QTC CALCULATION (BAZETT): 486 MS
EKG R AXIS: 75 DEGREES
EKG T AXIS: -62 DEGREES
EKG VENTRICULAR RATE: 76 BPM

## 2024-12-23 PROCEDURE — 93005 ELECTROCARDIOGRAM TRACING: CPT | Performed by: EMERGENCY MEDICINE

## 2024-12-23 PROCEDURE — 99213 OFFICE O/P EST LOW 20 MIN: CPT | Performed by: EMERGENCY MEDICINE

## 2024-12-23 PROCEDURE — 99214 OFFICE O/P EST MOD 30 MIN: CPT

## 2024-12-23 PROCEDURE — 6360000002 HC RX W HCPCS: Performed by: EMERGENCY MEDICINE

## 2024-12-23 PROCEDURE — 96372 THER/PROPH/DIAG INJ SC/IM: CPT

## 2024-12-23 PROCEDURE — 93010 ELECTROCARDIOGRAM REPORT: CPT | Performed by: INTERNAL MEDICINE

## 2024-12-23 PROCEDURE — 73030 X-RAY EXAM OF SHOULDER: CPT

## 2024-12-23 RX ORDER — METHYLPREDNISOLONE ACETATE 80 MG/ML
80 INJECTION, SUSPENSION INTRA-ARTICULAR; INTRALESIONAL; INTRAMUSCULAR; SOFT TISSUE ONCE
Status: COMPLETED | OUTPATIENT
Start: 2024-12-23 | End: 2024-12-23

## 2024-12-23 RX ADMIN — METHYLPREDNISOLONE ACETATE 80 MG: 80 INJECTION, SUSPENSION INTRA-ARTICULAR; INTRALESIONAL; INTRAMUSCULAR; SOFT TISSUE at 17:44

## 2024-12-23 ASSESSMENT — PAIN DESCRIPTION - FREQUENCY: FREQUENCY: CONTINUOUS

## 2024-12-23 ASSESSMENT — PAIN DESCRIPTION - LOCATION: LOCATION: NECK

## 2024-12-23 ASSESSMENT — PAIN DESCRIPTION - ORIENTATION: ORIENTATION: RIGHT

## 2024-12-23 ASSESSMENT — PAIN DESCRIPTION - DESCRIPTORS: DESCRIPTORS: SHARP;STABBING

## 2024-12-23 ASSESSMENT — PAIN DESCRIPTION - ONSET: ONSET: ON-GOING

## 2024-12-23 ASSESSMENT — ENCOUNTER SYMPTOMS: SHORTNESS OF BREATH: 0

## 2024-12-23 ASSESSMENT — PAIN SCALES - GENERAL: PAINLEVEL_OUTOF10: 8

## 2024-12-23 ASSESSMENT — PAIN - FUNCTIONAL ASSESSMENT
PAIN_FUNCTIONAL_ASSESSMENT: ACTIVITIES ARE NOT PREVENTED
PAIN_FUNCTIONAL_ASSESSMENT: 0-10

## 2024-12-23 ASSESSMENT — PAIN DESCRIPTION - PAIN TYPE: TYPE: ACUTE PAIN

## 2024-12-23 NOTE — ED TRIAGE NOTES
To room with c/o right jaw, neck, and shoulder pain x 1.5 weeks. Pt reports getting worse. \"I can't hardly touch it\" Using heating pad. Denies injury. Reports took home percocet without relief. He has had to increase home O2 . Not able to complete home hemodialysis that he normally would due to not getting up to it. Last dialysis was Thursday last week.

## 2024-12-23 NOTE — TELEPHONE ENCOUNTER
Physical therapist from [facility]'s home care called stating that patient is complaining of right shoulder pain radiates up his neck and into his jaw . Patient has been awake with the pain since 1AM and is rating pain at a 9 . Instructed therapist that the office is closing in 10 minutes and there are no physicians in on Christmas Eve or [place] day . Recommended that if pain is that significant that he be evaluated in the ER .

## 2024-12-23 NOTE — ED PROVIDER NOTES
[facility]  Urgent Care Encounter       CHIEF COMPLAINT       Chief Complaint   Patient presents with    Jaw Pain    Shoulder Pain       Nurses Notes reviewed and I agree except as noted in the HPI.  HISTORY OF PRESENT ILLNESS   Farhad Myles is a 64 y.o. male who presents for complaints of right shoulder pain.  Patient states she has had shoulder pain x 1 week.  He denies any injury.  States the pain radiates up into his neck.  States he cannot hardly touch his shoulder because it hurts so bad.  Movement increases his pain.  Patient denies any chest pain or shortness of breath.  The patient is already taking Percocet for chronic back and joint pain.  Patient states this is not touching his right shoulder pain.  Patient has multiple chronic health issues including end-stage renal disease.  The patient performs hemodialysis at home.  States he has not performed dialysis for 4 days because of his shoulder pain and not feeling up to performing his dialysis.  Patient denies any abnormal shortness of breath.  States he has not gained any weight.  States his weight is normal and he does weigh himself daily.  Patient does have mild edema but states his edema is his normal edema.  The patient is on home oxygen but states he has not had to increase his oxygen to aid with his breathing.  HPI    REVIEW OF SYSTEMS     Review of Systems   Constitutional:  Negative for activity change, fatigue and fever.   Respiratory:  Negative for shortness of breath.    Cardiovascular:  Negative for chest pain.   Musculoskeletal:  Positive for arthralgias (right shoulder) and neck pain (referred from right shoulder). Negative for joint swelling.       PAST MEDICAL HISTORY         Diagnosis Date    Admission for dialysis ([facility]) 9/1/2024    Arthritis     Back problem     back pain-sees [facility] pain mgmt    Bladder disease     Dr. Allison    CHF with unknown LVEF ([facility]) 05/24/2021    Dr. George/CHF clinic    Chronic kidney disease   This does not appear to be cardiac in nature as the shoulder is tender to palpation.  Movement increases right shoulder and neck pain.  Patient is difficult to treat due to end-stage renal disease and already taking Percocet for chronic pain.  I did give Depo-Medrol injection to see if this will help with pain until can see primary care provider/specialist.  Shoulder x-ray is performed showing degenerative changes, no acute findings.  Patient is advised to continue Percocet, continue physical therapy, continue ice/heat to the area if he gets benefit.  Follow-up with orthopedics next week for reevaluation if no significant improvement.        PATIENT REFERRED TO:  Dulce Maria Crespo MD  [address] [place] [address], [address] / [place] [address]      DISCHARGE MEDICATIONS:  Discharge Medication List as of 12/23/2024  6:39 PM          Discharge Medication List as of 12/23/2024  6:39 PM        STOP taking these medications       potassium chloride (K-TAB) 10 MEQ extended release tablet Comments:   Reason for Stopping:         glimepiride (AMARYL) 4 MG tablet Comments:   Reason for Stopping:               Discharge Medication List as of 12/23/2024  6:39 PM          SAHIL Coombs CNP    (Please note that portions of this note were completed with a voice recognition program. Efforts were made to edit the dictations but occasionally words are mis-transcribed.)           Harry Appiah, SAHIL - CNP  12/23/24 7578

## 2024-12-23 NOTE — DISCHARGE INSTRUCTIONS
Continue Percocet as prescribed    Continue ice/heat to the area if this is helpful    Continue physical therapy    Follow-up with orthopedics if no significant improvement

## 2024-12-29 ENCOUNTER — APPOINTMENT (OUTPATIENT)
Dept: GENERAL RADIOLOGY | Age: 64
End: 2024-12-29
Payer: MEDICARE

## 2024-12-29 ENCOUNTER — HOSPITAL ENCOUNTER (OUTPATIENT)
Age: 64
Setting detail: OBSERVATION
Discharge: HOME | End: 2025-01-01
Attending: EMERGENCY MEDICINE
Payer: MEDICARE

## 2024-12-29 DIAGNOSIS — I50.9 CHRONIC CONGESTIVE HEART FAILURE, UNSPECIFIED HEART FAILURE TYPE (HCC): ICD-10-CM

## 2024-12-29 DIAGNOSIS — F32.9 MAJOR DEPRESSIVE DISORDER WITH CURRENT ACTIVE EPISODE, UNSPECIFIED DEPRESSION EPISODE SEVERITY, UNSPECIFIED WHETHER RECURRENT: ICD-10-CM

## 2024-12-29 DIAGNOSIS — R06.02 SHORTNESS OF BREATH: ICD-10-CM

## 2024-12-29 DIAGNOSIS — R60.0 EDEMA OF RIGHT LOWER EXTREMITY: ICD-10-CM

## 2024-12-29 DIAGNOSIS — E87.70 HYPERVOLEMIA, UNSPECIFIED HYPERVOLEMIA TYPE: ICD-10-CM

## 2024-12-29 DIAGNOSIS — G62.9 NEUROPATHY: ICD-10-CM

## 2024-12-29 DIAGNOSIS — I50.9 ACUTE ON CHRONIC CONGESTIVE HEART FAILURE, UNSPECIFIED HEART FAILURE TYPE (HCC): Primary | ICD-10-CM

## 2024-12-29 DIAGNOSIS — G89.4 CHRONIC PAIN SYNDROME: ICD-10-CM

## 2024-12-29 LAB
ANION GAP SERPL CALC-SCNC: 10 MEQ/L (ref 8–16)
BASOPHILS ABSOLUTE: 0 THOU/MM3 (ref 0–0.1)
BASOPHILS NFR BLD AUTO: 0.7 %
BUN SERPL-MCNC: 42 MG/DL (ref 7–22)
CALCIUM SERPL-MCNC: 8.7 MG/DL (ref 8.5–10.5)
CHLORIDE SERPL-SCNC: 101 MEQ/L (ref 98–111)
CO2 SERPL-SCNC: 29 MEQ/L (ref 23–33)
CREAT SERPL-MCNC: 4.4 MG/DL (ref 0.4–1.2)
DEPRECATED RDW RBC AUTO: 49.8 FL (ref 35–45)
EKG ATRIAL RATE: 70 BPM
EKG P AXIS: 90 DEGREES
EKG P-R INTERVAL: 190 MS
EKG Q-T INTERVAL: 460 MS
EKG QRS DURATION: 166 MS
EKG QTC CALCULATION (BAZETT): 496 MS
EKG R AXIS: 26 DEGREES
EKG T AXIS: -34 DEGREES
EKG VENTRICULAR RATE: 70 BPM
EOSINOPHIL NFR BLD AUTO: 3.8 %
EOSINOPHILS ABSOLUTE: 0.2 THOU/MM3 (ref 0–0.4)
ERYTHROCYTE [DISTWIDTH] IN BLOOD BY AUTOMATED COUNT: 14.5 % (ref 11.5–14.5)
GFR SERPL CREATININE-BSD FRML MDRD: 14 ML/MIN/1.73M2
GLUCOSE SERPL-MCNC: 84 MG/DL (ref 70–108)
HCT VFR BLD AUTO: 30.6 % (ref 42–52)
HGB BLD-MCNC: 10.1 GM/DL (ref 14–18)
IMM GRANULOCYTES # BLD AUTO: 0.03 THOU/MM3 (ref 0–0.07)
IMM GRANULOCYTES NFR BLD AUTO: 0.5 %
LYMPHOCYTES ABSOLUTE: 0.3 THOU/MM3 (ref 1–4.8)
LYMPHOCYTES NFR BLD AUTO: 5.5 %
MAGNESIUM SERPL-MCNC: 1.8 MG/DL (ref 1.6–2.4)
MCH RBC QN AUTO: 31.1 PG (ref 26–33)
MCHC RBC AUTO-ENTMCNC: 33 GM/DL (ref 32.2–35.5)
MCV RBC AUTO: 94.2 FL (ref 80–94)
MONOCYTES ABSOLUTE: 0.6 THOU/MM3 (ref 0.4–1.3)
MONOCYTES NFR BLD AUTO: 10.4 %
NEUTROPHILS ABSOLUTE: 4.7 THOU/MM3 (ref 1.8–7.7)
NEUTROPHILS NFR BLD AUTO: 79.1 %
NRBC BLD AUTO-RTO: 0 /100 WBC
NT-PROBNP SERPL IA-MCNC: ABNORMAL PG/ML (ref 0–124)
OSMOLALITY SERPL CALC.SUM OF ELEC: 289.1 MOSMOL/KG (ref 275–300)
PHOSPHATE SERPL-MCNC: 3.6 MG/DL (ref 2.4–4.7)
PLATELET # BLD AUTO: 173 THOU/MM3 (ref 130–400)
PMV BLD AUTO: 9.3 FL (ref 9.4–12.4)
POTASSIUM SERPL-SCNC: 4.1 MEQ/L (ref 3.5–5.2)
RBC # BLD AUTO: 3.25 MILL/MM3 (ref 4.7–6.1)
SODIUM SERPL-SCNC: 140 MEQ/L (ref 135–145)
TROPONIN, HIGH SENSITIVITY: 100 NG/L (ref 0–12)
WBC # BLD AUTO: 5.9 THOU/MM3 (ref 4.8–10.8)

## 2024-12-29 PROCEDURE — 71045 X-RAY EXAM CHEST 1 VIEW: CPT

## 2024-12-29 PROCEDURE — 84100 ASSAY OF PHOSPHORUS: CPT

## 2024-12-29 PROCEDURE — 85025 COMPLETE CBC W/AUTO DIFF WBC: CPT

## 2024-12-29 PROCEDURE — 99285 EMERGENCY DEPT VISIT HI MDM: CPT

## 2024-12-29 PROCEDURE — 93010 ELECTROCARDIOGRAM REPORT: CPT | Performed by: INTERNAL MEDICINE

## 2024-12-29 PROCEDURE — 84484 ASSAY OF TROPONIN QUANT: CPT

## 2024-12-29 PROCEDURE — 83735 ASSAY OF MAGNESIUM: CPT

## 2024-12-29 PROCEDURE — 83880 ASSAY OF NATRIURETIC PEPTIDE: CPT

## 2024-12-29 PROCEDURE — 80048 BASIC METABOLIC PNL TOTAL CA: CPT

## 2024-12-29 PROCEDURE — 36415 COLL VENOUS BLD VENIPUNCTURE: CPT

## 2024-12-29 PROCEDURE — 93005 ELECTROCARDIOGRAM TRACING: CPT | Performed by: EMERGENCY MEDICINE

## 2024-12-29 PROCEDURE — 99223 1ST HOSP IP/OBS HIGH 75: CPT

## 2024-12-29 ASSESSMENT — PAIN - FUNCTIONAL ASSESSMENT
PAIN_FUNCTIONAL_ASSESSMENT: NONE - DENIES PAIN

## 2024-12-29 ASSESSMENT — PAIN SCALES - GENERAL: PAINLEVEL_OUTOF10: 5

## 2024-12-30 ENCOUNTER — APPOINTMENT (OUTPATIENT)
Dept: INTERVENTIONAL RADIOLOGY/VASCULAR | Age: 64
End: 2024-12-30
Payer: MEDICARE

## 2024-12-30 PROBLEM — E87.70 FLUID OVERLOAD: Status: ACTIVE | Noted: 2024-12-30

## 2024-12-30 LAB
ALBUMIN SERPL BCG-MCNC: 3.1 G/DL (ref 3.5–5.1)
ALP SERPL-CCNC: 78 U/L (ref 38–126)
ALT SERPL W/O P-5'-P-CCNC: 11 U/L (ref 11–66)
ANION GAP SERPL CALC-SCNC: 10 MEQ/L (ref 8–16)
AST SERPL-CCNC: 13 U/L (ref 5–40)
BASOPHILS ABSOLUTE: 0 THOU/MM3 (ref 0–0.1)
BASOPHILS NFR BLD AUTO: 0.6 %
BILIRUB CONJ SERPL-MCNC: < 0.1 MG/DL (ref 0.1–13.8)
BILIRUB SERPL-MCNC: 0.3 MG/DL (ref 0.3–1.2)
BUN SERPL-MCNC: 43 MG/DL (ref 7–22)
CALCIUM SERPL-MCNC: 8.7 MG/DL (ref 8.5–10.5)
CHLORIDE SERPL-SCNC: 99 MEQ/L (ref 98–111)
CHOLEST SERPL-MCNC: 70 MG/DL (ref 100–199)
CO2 SERPL-SCNC: 29 MEQ/L (ref 23–33)
CREAT SERPL-MCNC: 5.1 MG/DL (ref 0.4–1.2)
DEPRECATED RDW RBC AUTO: 49.5 FL (ref 35–45)
ECHO BSA: 2.59 M2
EOSINOPHIL NFR BLD AUTO: 3.9 %
EOSINOPHILS ABSOLUTE: 0.2 THOU/MM3 (ref 0–0.4)
ERYTHROCYTE [DISTWIDTH] IN BLOOD BY AUTOMATED COUNT: 14.4 % (ref 11.5–14.5)
GFR SERPL CREATININE-BSD FRML MDRD: 12 ML/MIN/1.73M2
GLUCOSE BLD STRIP.AUTO-MCNC: 83 MG/DL (ref 70–108)
GLUCOSE BLD STRIP.AUTO-MCNC: 93 MG/DL (ref 70–108)
GLUCOSE SERPL-MCNC: 71 MG/DL (ref 70–108)
HCT VFR BLD AUTO: 31.4 % (ref 42–52)
HDLC SERPL-MCNC: 36 MG/DL
HGB BLD-MCNC: 10.1 GM/DL (ref 14–18)
IMM GRANULOCYTES # BLD AUTO: 0.02 THOU/MM3 (ref 0–0.07)
IMM GRANULOCYTES NFR BLD AUTO: 0.3 %
LDLC SERPL CALC-MCNC: 20 MG/DL
LYMPHOCYTES ABSOLUTE: 0.4 THOU/MM3 (ref 1–4.8)
LYMPHOCYTES NFR BLD AUTO: 6.6 %
MAGNESIUM SERPL-MCNC: 1.7 MG/DL (ref 1.6–2.4)
MCH RBC QN AUTO: 30.4 PG (ref 26–33)
MCHC RBC AUTO-ENTMCNC: 32.2 GM/DL (ref 32.2–35.5)
MCV RBC AUTO: 94.6 FL (ref 80–94)
MONOCYTES ABSOLUTE: 0.7 THOU/MM3 (ref 0.4–1.3)
MONOCYTES NFR BLD AUTO: 11.9 %
NEUTROPHILS ABSOLUTE: 4.8 THOU/MM3 (ref 1.8–7.7)
NEUTROPHILS NFR BLD AUTO: 76.7 %
NRBC BLD AUTO-RTO: 0 /100 WBC
OSMOLALITY SERPL CALC.SUM OF ELEC: 285 MOSMOL/KG (ref 275–300)
PLATELET # BLD AUTO: 157 THOU/MM3 (ref 130–400)
PMV BLD AUTO: 8.9 FL (ref 9.4–12.4)
POTASSIUM SERPL-SCNC: 3.7 MEQ/L (ref 3.5–5.2)
PROT SERPL-MCNC: 6.4 G/DL (ref 6.1–8)
RBC # BLD AUTO: 3.32 MILL/MM3 (ref 4.7–6.1)
SODIUM SERPL-SCNC: 138 MEQ/L (ref 135–145)
T4 FREE SERPL-MCNC: 0.94 NG/DL (ref 0.93–1.68)
TRIGL SERPL-MCNC: 72 MG/DL (ref 0–199)
TSH SERPL DL<=0.005 MIU/L-ACNC: 18.62 UIU/ML (ref 0.4–4.2)
WBC # BLD AUTO: 6.2 THOU/MM3 (ref 4.8–10.8)

## 2024-12-30 PROCEDURE — 94660 CPAP INITIATION&MGMT: CPT

## 2024-12-30 PROCEDURE — 84439 ASSAY OF FREE THYROXINE: CPT

## 2024-12-30 PROCEDURE — G0378 HOSPITAL OBSERVATION PER HR: HCPCS

## 2024-12-30 PROCEDURE — 6370000000 HC RX 637 (ALT 250 FOR IP)

## 2024-12-30 PROCEDURE — 82248 BILIRUBIN DIRECT: CPT

## 2024-12-30 PROCEDURE — 82948 REAGENT STRIP/BLOOD GLUCOSE: CPT

## 2024-12-30 PROCEDURE — 2700000000 HC OXYGEN THERAPY PER DAY

## 2024-12-30 PROCEDURE — 36415 COLL VENOUS BLD VENIPUNCTURE: CPT

## 2024-12-30 PROCEDURE — 2500000003 HC RX 250 WO HCPCS

## 2024-12-30 PROCEDURE — 84443 ASSAY THYROID STIM HORMONE: CPT

## 2024-12-30 PROCEDURE — 80061 LIPID PANEL: CPT

## 2024-12-30 PROCEDURE — 93971 EXTREMITY STUDY: CPT

## 2024-12-30 PROCEDURE — 6360000002 HC RX W HCPCS

## 2024-12-30 PROCEDURE — 83735 ASSAY OF MAGNESIUM: CPT

## 2024-12-30 PROCEDURE — 94761 N-INVAS EAR/PLS OXIMETRY MLT: CPT

## 2024-12-30 PROCEDURE — 85025 COMPLETE CBC W/AUTO DIFF WBC: CPT

## 2024-12-30 PROCEDURE — 99233 SBSQ HOSP IP/OBS HIGH 50: CPT | Performed by: INTERNAL MEDICINE

## 2024-12-30 PROCEDURE — 90935 HEMODIALYSIS ONE EVALUATION: CPT

## 2024-12-30 PROCEDURE — 96372 THER/PROPH/DIAG INJ SC/IM: CPT

## 2024-12-30 PROCEDURE — 80053 COMPREHEN METABOLIC PANEL: CPT

## 2024-12-30 RX ORDER — ENOXAPARIN SODIUM 100 MG/ML
30 INJECTION SUBCUTANEOUS DAILY
Status: DISCONTINUED | OUTPATIENT
Start: 2024-12-30 | End: 2025-01-01 | Stop reason: HOSPADM

## 2024-12-30 RX ORDER — GABAPENTIN 300 MG/1
300 CAPSULE ORAL NIGHTLY
Status: DISCONTINUED | OUTPATIENT
Start: 2024-12-30 | End: 2025-01-01 | Stop reason: HOSPADM

## 2024-12-30 RX ORDER — SODIUM CHLORIDE 9 MG/ML
INJECTION, SOLUTION INTRAVENOUS PRN
Status: DISCONTINUED | OUTPATIENT
Start: 2024-12-30 | End: 2025-01-01 | Stop reason: HOSPADM

## 2024-12-30 RX ORDER — ASPIRIN 81 MG/1
81 TABLET ORAL DAILY
Status: DISCONTINUED | OUTPATIENT
Start: 2024-12-30 | End: 2025-01-01 | Stop reason: HOSPADM

## 2024-12-30 RX ORDER — MONTELUKAST SODIUM 10 MG/1
10 TABLET ORAL NIGHTLY
Status: DISCONTINUED | OUTPATIENT
Start: 2024-12-30 | End: 2025-01-01 | Stop reason: HOSPADM

## 2024-12-30 RX ORDER — CARVEDILOL 25 MG/1
25 TABLET ORAL 2 TIMES DAILY
Status: DISCONTINUED | OUTPATIENT
Start: 2024-12-30 | End: 2025-01-01 | Stop reason: HOSPADM

## 2024-12-30 RX ORDER — ONDANSETRON 2 MG/ML
4 INJECTION INTRAMUSCULAR; INTRAVENOUS EVERY 6 HOURS PRN
Status: DISCONTINUED | OUTPATIENT
Start: 2024-12-30 | End: 2025-01-01 | Stop reason: HOSPADM

## 2024-12-30 RX ORDER — ACETAMINOPHEN 325 MG/1
650 TABLET ORAL EVERY 6 HOURS PRN
Status: DISCONTINUED | OUTPATIENT
Start: 2024-12-30 | End: 2025-01-01 | Stop reason: HOSPADM

## 2024-12-30 RX ORDER — ALBUTEROL SULFATE 90 UG/1
2 INHALANT RESPIRATORY (INHALATION) EVERY 6 HOURS PRN
Status: DISCONTINUED | OUTPATIENT
Start: 2024-12-30 | End: 2024-12-30

## 2024-12-30 RX ORDER — PRIMIDONE 50 MG/1
50 TABLET ORAL
Status: DISCONTINUED | OUTPATIENT
Start: 2024-12-30 | End: 2025-01-01 | Stop reason: HOSPADM

## 2024-12-30 RX ORDER — PANTOPRAZOLE SODIUM 40 MG/1
40 TABLET, DELAYED RELEASE ORAL
Status: DISCONTINUED | OUTPATIENT
Start: 2024-12-30 | End: 2025-01-01 | Stop reason: HOSPADM

## 2024-12-30 RX ORDER — BUMETANIDE 1 MG/1
2 TABLET ORAL DAILY
Status: DISCONTINUED | OUTPATIENT
Start: 2024-12-30 | End: 2025-01-01 | Stop reason: HOSPADM

## 2024-12-30 RX ORDER — POLYETHYLENE GLYCOL 3350 17 G/17G
17 POWDER, FOR SOLUTION ORAL DAILY
Status: DISCONTINUED | OUTPATIENT
Start: 2024-12-30 | End: 2025-01-01 | Stop reason: HOSPADM

## 2024-12-30 RX ORDER — AMLODIPINE BESYLATE 10 MG/1
10 TABLET ORAL DAILY
Status: DISCONTINUED | OUTPATIENT
Start: 2024-12-30 | End: 2025-01-01 | Stop reason: HOSPADM

## 2024-12-30 RX ORDER — GABAPENTIN 300 MG/1
600 CAPSULE ORAL NIGHTLY
Status: DISCONTINUED | OUTPATIENT
Start: 2024-12-30 | End: 2024-12-30 | Stop reason: SDUPTHER

## 2024-12-30 RX ORDER — ACETAMINOPHEN 650 MG/1
650 SUPPOSITORY RECTAL EVERY 6 HOURS PRN
Status: DISCONTINUED | OUTPATIENT
Start: 2024-12-30 | End: 2025-01-01 | Stop reason: HOSPADM

## 2024-12-30 RX ORDER — CLOPIDOGREL BISULFATE 75 MG/1
75 TABLET ORAL DAILY
Status: DISCONTINUED | OUTPATIENT
Start: 2024-12-30 | End: 2025-01-01 | Stop reason: HOSPADM

## 2024-12-30 RX ORDER — ROPINIROLE 1 MG/1
1 TABLET, FILM COATED ORAL NIGHTLY
Status: DISCONTINUED | OUTPATIENT
Start: 2024-12-30 | End: 2025-01-01 | Stop reason: HOSPADM

## 2024-12-30 RX ORDER — ALBUTEROL SULFATE 90 UG/1
2 INHALANT RESPIRATORY (INHALATION) EVERY 4 HOURS PRN
Status: DISCONTINUED | OUTPATIENT
Start: 2024-12-30 | End: 2025-01-01 | Stop reason: HOSPADM

## 2024-12-30 RX ORDER — LEVOTHYROXINE SODIUM 100 UG/1
200 TABLET ORAL
Status: DISCONTINUED | OUTPATIENT
Start: 2024-12-30 | End: 2025-01-01 | Stop reason: HOSPADM

## 2024-12-30 RX ORDER — OXYCODONE AND ACETAMINOPHEN 10; 325 MG/1; MG/1
1 TABLET ORAL EVERY 8 HOURS PRN
Status: DISCONTINUED | OUTPATIENT
Start: 2024-12-30 | End: 2025-01-01 | Stop reason: HOSPADM

## 2024-12-30 RX ORDER — MIRTAZAPINE 7.5 MG/1
7.5 TABLET, FILM COATED ORAL NIGHTLY
Status: DISCONTINUED | OUTPATIENT
Start: 2024-12-30 | End: 2025-01-01 | Stop reason: HOSPADM

## 2024-12-30 RX ORDER — SODIUM CHLORIDE 0.9 % (FLUSH) 0.9 %
5-40 SYRINGE (ML) INJECTION EVERY 12 HOURS SCHEDULED
Status: DISCONTINUED | OUTPATIENT
Start: 2024-12-30 | End: 2025-01-01 | Stop reason: HOSPADM

## 2024-12-30 RX ORDER — POLYETHYLENE GLYCOL 3350 17 G/17G
17 POWDER, FOR SOLUTION ORAL DAILY PRN
Status: DISCONTINUED | OUTPATIENT
Start: 2024-12-30 | End: 2024-12-30

## 2024-12-30 RX ORDER — ATORVASTATIN CALCIUM 40 MG/1
40 TABLET, FILM COATED ORAL DAILY
Status: DISCONTINUED | OUTPATIENT
Start: 2024-12-30 | End: 2025-01-01 | Stop reason: HOSPADM

## 2024-12-30 RX ORDER — PRIMIDONE 50 MG/1
100 TABLET ORAL DAILY
Status: DISCONTINUED | OUTPATIENT
Start: 2024-12-30 | End: 2025-01-01 | Stop reason: HOSPADM

## 2024-12-30 RX ORDER — GABAPENTIN 300 MG/1
600 CAPSULE ORAL NIGHTLY
Status: DISCONTINUED | OUTPATIENT
Start: 2024-12-30 | End: 2024-12-30

## 2024-12-30 RX ORDER — TAMSULOSIN HYDROCHLORIDE 0.4 MG/1
0.4 CAPSULE ORAL 2 TIMES DAILY
Status: DISCONTINUED | OUTPATIENT
Start: 2024-12-30 | End: 2025-01-01 | Stop reason: HOSPADM

## 2024-12-30 RX ORDER — SODIUM CHLORIDE 0.9 % (FLUSH) 0.9 %
5-40 SYRINGE (ML) INJECTION PRN
Status: DISCONTINUED | OUTPATIENT
Start: 2024-12-30 | End: 2025-01-01 | Stop reason: HOSPADM

## 2024-12-30 RX ORDER — ONDANSETRON 4 MG/1
4 TABLET, ORALLY DISINTEGRATING ORAL EVERY 8 HOURS PRN
Status: DISCONTINUED | OUTPATIENT
Start: 2024-12-30 | End: 2025-01-01 | Stop reason: HOSPADM

## 2024-12-30 RX ORDER — SEVELAMER CARBONATE 800 MG/1
800 TABLET, FILM COATED ORAL
Status: DISCONTINUED | OUTPATIENT
Start: 2024-12-30 | End: 2025-01-01 | Stop reason: HOSPADM

## 2024-12-30 RX ADMIN — OXYCODONE AND ACETAMINOPHEN 1 TABLET: 10; 325 TABLET ORAL at 15:59

## 2024-12-30 RX ADMIN — ENOXAPARIN SODIUM 30 MG: 100 INJECTION SUBCUTANEOUS at 15:38

## 2024-12-30 RX ADMIN — TAMSULOSIN HYDROCHLORIDE 0.4 MG: 0.4 CAPSULE ORAL at 15:36

## 2024-12-30 RX ADMIN — Medication 3 MG: at 23:45

## 2024-12-30 RX ADMIN — BUMETANIDE 2 MG: 1 TABLET ORAL at 16:30

## 2024-12-30 RX ADMIN — OXYCODONE AND ACETAMINOPHEN 1 TABLET: 10; 325 TABLET ORAL at 23:51

## 2024-12-30 RX ADMIN — CARVEDILOL 25 MG: 25 TABLET, FILM COATED ORAL at 16:29

## 2024-12-30 RX ADMIN — SODIUM CHLORIDE, PRESERVATIVE FREE 10 ML: 5 INJECTION INTRAVENOUS at 15:39

## 2024-12-30 RX ADMIN — PRIMIDONE 50 MG: 50 TABLET ORAL at 23:45

## 2024-12-30 RX ADMIN — LEVOTHYROXINE SODIUM 200 MCG: 0.1 TABLET ORAL at 15:38

## 2024-12-30 RX ADMIN — GABAPENTIN 300 MG: 300 CAPSULE ORAL at 21:06

## 2024-12-30 RX ADMIN — CARVEDILOL 25 MG: 25 TABLET, FILM COATED ORAL at 21:06

## 2024-12-30 RX ADMIN — PRIMIDONE 100 MG: 50 TABLET ORAL at 16:29

## 2024-12-30 RX ADMIN — ROPINIROLE HYDROCHLORIDE 1 MG: 1 TABLET, FILM COATED ORAL at 21:06

## 2024-12-30 RX ADMIN — MIRTAZAPINE 7.5 MG: 7.5 TABLET, FILM COATED ORAL at 22:42

## 2024-12-30 RX ADMIN — ASPIRIN 81 MG: 81 TABLET, COATED ORAL at 15:37

## 2024-12-30 RX ADMIN — AMLODIPINE BESYLATE 10 MG: 10 TABLET ORAL at 15:37

## 2024-12-30 RX ADMIN — SODIUM CHLORIDE, PRESERVATIVE FREE 10 ML: 5 INJECTION INTRAVENOUS at 22:43

## 2024-12-30 RX ADMIN — CLOPIDOGREL BISULFATE 75 MG: 75 TABLET ORAL at 15:37

## 2024-12-30 RX ADMIN — OXYCODONE AND ACETAMINOPHEN 1 TABLET: 10; 325 TABLET ORAL at 03:54

## 2024-12-30 RX ADMIN — PANTOPRAZOLE SODIUM 40 MG: 40 TABLET, DELAYED RELEASE ORAL at 15:36

## 2024-12-30 RX ADMIN — ATORVASTATIN CALCIUM 40 MG: 40 TABLET, FILM COATED ORAL at 16:29

## 2024-12-30 ASSESSMENT — PAIN DESCRIPTION - LOCATION
LOCATION: BACK;KNEE
LOCATION: BACK

## 2024-12-30 ASSESSMENT — PAIN SCALES - GENERAL
PAINLEVEL_OUTOF10: 6
PAINLEVEL_OUTOF10: 2
PAINLEVEL_OUTOF10: 7

## 2024-12-30 ASSESSMENT — PAIN SCALES - WONG BAKER: WONGBAKER_NUMERICALRESPONSE: NO HURT

## 2024-12-30 ASSESSMENT — PAIN DESCRIPTION - DESCRIPTORS
DESCRIPTORS: ACHING;DISCOMFORT
DESCRIPTORS: THROBBING

## 2024-12-30 ASSESSMENT — PAIN DESCRIPTION - ORIENTATION: ORIENTATION: ANTERIOR;POSTERIOR

## 2024-12-30 NOTE — FLOWSHEET NOTE
12/30/24 1045 12/30/24 1505   Vital Signs   BP (!) 157/81 (!) 143/79   Temp 98.1 °F (36.7 °C) 98.1 °F (36.7 °C)   Pulse 78 66   Respirations 20 16   SpO2 96 % 97 %   Weight - Scale 133.7 kg (294 lb 12.1 oz) 129.7 kg (285 lb 15 oz)   Weight Method Bed scale Bed scale   Percent Weight Change 1.29 -2.99   Post-Hemodialysis Assessment   Post-Treatment Procedures  --  Blood returned;Access bleeding time < 10 minutes   Machine Disinfection Process  --  Acid/Vinegar Clean;Heat Disinfect;Exterior Machine Disinfection   Blood Volume Processed (Liters)  --  80.7 L   Dialyzer Clearance  --  Lightly streaked   Duration of Treatment (minutes)  --  240 minutes   Hemodialysis Intake (ml)  --  400 ml   Hemodialysis Output (ml)  --  4400 ml   NET Removed (ml)  --  4000   Tolerated Treatment  --  Good     Stable 4 hour treatment complete. Removed 4 liters of fluid as per order. Tolerated fluid removal well. Pressure held to needle sites times ten minutes each. Dressing clean, dry and intact. Report given to primary RN. Treatment record printed for scanning into EMR.

## 2024-12-30 NOTE — H&P
Hospitalist History & Physical    Patient:  Farhad Myles    Unit/Bed:39/039A  YOB: 1960  MRN: [id number]   Acct: [id number]   PCP: Dulce Maria Crespo MD  Code Status: Prior    Date of Service: Pt seen/examined on 12/29/24 and admitted to Observation with expected LOS less than two midnights due to medical therapy.     Chief Complaint: dizziness, palpitations    Assessment/Plan:    ESRD on hemodialysis M,T,R,F in fluid overload: Complains of dizziness, increased SOB, especially with exertion, heart palpitations, and BLE edema over the past day. Reports having dialysis yesterday and the day prior but missed all last week due to not liking the way he feels after dialysis. Patient does his own dialysis at home and pulled off 4L of fluid yesterday, this is likely the cause of his symptoms.   Nephrology consult  Bipap tonight  Continue Renvela    RLE edema: Right lower extremity edema, denies pain or tenderness to area.   Venous doppler RLE pending    HFpEF: 9/2024 Echo: EF 50-55%, moderately reduced systolic function. CXR favor congestive heart failure/fluid overload, cardiomegaly with central venous congestion, diffuse interstitial thickening throughout the bilateral lungs likely representing interstitial edema. BNP 20332, but down from previous. Likely caused by fluid overload due to missed dialysis vs CHF exacerbation.   Continue Bumex  Limited Echo in AM  Daily weight, I&O  Low Na Diet, 2L fluid restriction    Chronic respiratory failure/restrictive airway disease: Baseline home O2 4LNC.   Continue home O2 to maintain SpO2 >90%  Continue Singulair, PRN albuterol    CAD: S/p PCI to LAD x 2 5/2024. Follows with Dr. George with cardiology as OP.   Continue ASA, Plavix, statin    NIDDMII: 8/31/24 A1c 4.4. On Amaryl at home.   Hold oral antidiabetics while IP  Daily BMP for glucose monitoring-may need more frequent monitoring if hyperglycemic  Carb control diet    Essential HTN:   Continue

## 2024-12-30 NOTE — ED NOTES
Pt assisted to recliner, tolerated well. Pt removed BiPAP, states he won't be able to sleep while wearing it. Nasal cannula placed on pt. Telemetry in place and call light in reach

## 2024-12-30 NOTE — ED TRIAGE NOTES
Pt presents to ED with concern for fluid overload after not going to dialysis. Pt reports he is supposed to have dialysis 4x a week and skipped last week. He went to dialysis yesterday and then began feeling dizzy and like his heart was fluttering. Pt reports SOB and nausea as well. Pt on 4L NC

## 2024-12-30 NOTE — ED NOTES
Bedside shift report given to Ashlie MATUTE. Pt taken to vascular at this time with central transport. No distress noted

## 2024-12-30 NOTE — ED NOTES
Pt resting comfortably on cot at this time. Med rec completed and reviewed with patient. Respiratory at bedside for BiPAP. Pt provided with urinal and warm blanket, denies further needs. Telemetry in place and call light in reach.

## 2024-12-30 NOTE — ED NOTES
Pt sitting in bed watching tv at this time. A&Ox4. Updated on plan of care. Voiced no needs. Call light in reach.

## 2024-12-30 NOTE — RT PROTOCOL NOTE
equivalent nebulizer order(s) with same Frequency and PRN reasons based on the score.  If a patient is on this medication at home then do not decrease Frequency below that used at home.    0-3 - enter or revise RT bronchodilator order(s) to equivalent RT Bronchodilator order with Frequency of every 4 hours PRN for wheezing or increased work of breathing using Per Protocol order mode.        4-6 - enter or revise RT Bronchodilator order(s) to two equivalent RT bronchodilator orders with one order with BID Frequency and one order with Frequency of every 4 hours PRN wheezing or increased work of breathing using Per Protocol order mode.        7-10 - enter or revise RT Bronchodilator order(s) to two equivalent RT bronchodilator orders with one order with TID Frequency and one order with Frequency of every 4 hours PRN wheezing or increased work of breathing using Per Protocol order mode.       11-13 - enter or revise RT Bronchodilator order(s) to one equivalent RT bronchodilator order with QID Frequency and an Albuterol order with Frequency of every 4 hours PRN wheezing or increased work of breathing using Per Protocol order mode.      Greater than 13 - enter or revise RT Bronchodilator order(s) to one equivalent RT bronchodilator order with every 4 hours Frequency and an Albuterol order with Frequency of every 2 hours PRN wheezing or increased work of breathing using Per Protocol order mode.     RT to enter RT Home Evaluation for COPD & MDI Assessment order using Per Protocol order mode.    Electronically signed by Lorenza Zepeda RCP on 12/30/2024 at 9:26 AM

## 2024-12-30 NOTE — CONSULTS
[facility]    [address] [id number]  [phone]     Hospital Consult  12/30/2024 11:19 AM    Pt Name:    Farhad Myles  MRN:     [id number]   [id number]  YOB: 1960  Admit Date:    12/29/2024  7:32 PM  Primary Care Physician:  Dulce Maria Crespo MD        Reason for Consult:  ESRD    History:   The patient is a 64 y.o. male seen in renal consult for ESRD. He does HHD 4 days a week under the care of Dr. Em.  Additional hx includes CAD, HTN, DM, HTN, LAURIE, HFpEF, chronic hypoxic resp failure on 4 L home O2. He presented to the hospital with dizziness and palpitations after his dialysis tx on Saturday.  He has not been compliant with his dialysis recently and was 10 kg above his dry weight and SOB. It was recommended last week for him to go to the ED which he refused. He did HD on Fri and Sat and during tx on Sat states he started to become tachycardic.  Also had increased right leg swelling so came to the ED. Duplex US was negative for DVT. CXR showed pulmonary edema. BP is high.  On 4 L NC chronically.   He is about 6 kg above his dry weight still.    Past Medical History:  Past Medical History:   Diagnosis Date    Admission for dialysis ([facility]) 9/1/2024    Arthritis     Back problem     back pain-sees [facility] pain mgmt    Bladder disease     Dr. Allison    CHF with unknown LVEF ([facility]) 05/24/2021    Dr. George/CHF clinic    Chronic kidney disease     Constipation     Coronary artery disease involving native coronary artery of native heart with angina pectoris ([facility]) 5/21/2024    History of diabetes mellitus     resolved with weight loss    Hypertension     Hypertensive emergency 04/11/2019    Hypertensive urgency 04/13/2019    Sleep apnea     has bipap-sees AZRA Olmedo CNP    Thyroid disease        Past Surgical History:  Past Surgical History:   Procedure Laterality Date    ABDOMEN SURGERY      ABDOMEN SURGERY N/A 3/25/2022    ABDOMINAL LYSIS OF ADHESIONS, EXPLANT OF

## 2024-12-30 NOTE — ED NOTES
Pt sitting comfortably in recliner with no acute distress noted. Telemetry in place and call light in reach.

## 2024-12-30 NOTE — ED NOTES
ED to inpatient nurses report      Chief Complaint:  Chief Complaint   Patient presents with    Dizziness    Palpitations     Present to ED from: Home    MOA:     LOC: alert and orientated to name, place, date  Mobility: Requires assistance * 1  Oxygen Baseline: 4L    Current needs required: 4L     Code Status:   Full Code    What abnormal results were found and what did you give/do to treat them? CHF exacerbation  Any procedures or intervention occur?     Mental Status:  Level of Consciousness: Alert (0)    Psych Assessment:        Vitals:  Patient Vitals for the past 24 hrs:   BP Temp Temp src Pulse Resp SpO2 Height Weight   12/30/24 0645 (!) 161/81 -- -- 66 16 97 % -- --   12/30/24 0450 (!) 158/74 -- -- 66 18 97 % -- --   12/30/24 0355 (!) 164/86 -- -- 64 -- -- -- --   12/30/24 0253 (!) 165/74 -- -- 66 19 92 % -- --   12/29/24 2340 (!) 161/99 -- -- 65 20 97 % -- --   12/29/24 2239 (!) 162/97 -- -- 66 16 96 % -- --   12/29/24 2204 (!) 167/100 -- -- 67 16 98 % -- --   12/29/24 2158 (!) 167/100 -- -- 73 16 98 % -- --   12/29/24 2042 (!) 157/84 -- -- 66 14 96 % -- --   12/29/24 1943 (!) 175/82 97.8 °F (36.6 °C) Oral 71 18 94 % 1.829 m (6') 132 kg (291 lb)        LDAs:   Peripheral IV 12/29/24 Distal;Right Forearm (Active)   Site Assessment Clean, dry & intact 12/29/24 2340   Line Status Normal saline locked 12/29/24 2340   Phlebitis Assessment No symptoms 12/29/24 2340   Infiltration Assessment 0 12/29/24 2340   Dressing Status Clean, dry & intact 12/29/24 2340   Dressing Type Transparent 12/29/24 2340       Ambulatory Status:  Presents to emergency department  because of falls (Syncope, seizure, or loss of consciousness): No, Age > 70: No, Altered Mental Status, Intoxication with alcohol or substance confusion (Disorientation, impaired judgment, poor safety awaremess, or inability to follow instructions): No, Impaired Mobility: Ambulates or transfers with assistive devices or assistance; Unable to ambulate or

## 2024-12-30 NOTE — ED NOTES
Spoke with 3A staff to approve pt transport to [facility]. Pt in stable condition at this time.

## 2024-12-30 NOTE — ED PROVIDER NOTES
[facility]  EMERGENCY MEDICINE ATTENDING ATTESTATION      Evaluation of Farhad Myles.   Case discussed and care plan developed with resident physician.   I agree with the resident physician documentation and plan as documented by her, except if my documentation differs.   Patient seen, interviewed and examined by me.  I reviewed the medical, surgical, family and social history, medications and allergies.   I have reviewed and interpreted all available lab, radiology and ekg results available at the moment.  I have reviewed the nursing documentation.     Please see the resident physician completed note for final disposition except as documented on this attestation.   I have reviewed and interpreted all available lab, radiology and ekg results available at the moment.  Diagnosis, treatment and disposition plans were discussed and agreed upon by patient.   This transcription was electronically signed. It was dictated by use of voice recognition software and electronically transcribed. The transcription may contain errors not detected in proofreading.     I performed direct supervision and was present for the critical portion following procedures: None  Critical care time on this case: None    Electronically signed by Harry Pryor MD on 12/29/24 at 10:10 PM Harry Dupree MD  12/29/24 7982    
ABDOMINAL LYSIS OF ADHESIONS, EXPLANT OF INFECTED MESH performed by Patrick Bond MD at [facility] OR    APPENDECTOMY      CARDIAC CATHETERIZATION      no stents    CARDIAC PROCEDURE Bilateral 5/13/2024    Left heart cath / coronary angiography performed by Abiola Segura MD at [facility] CARDIAC CATH LAB    CARDIAC PROCEDURE N/A 5/13/2024    Percutaneous coronary intervention performed by Abiola Segura MD at [facility] CARDIAC CATH LAB    CARPAL TUNNEL RELEASE Bilateral     CIRCUMCISION N/A 3/2/2023    Cystoscopy Circumcision performed by Suresh Gold MD at [facility] OR    COLONOSCOPY  2017    Dr. Varner    COLONOSCOPY N/A 7/7/2023    Colonoscopy performed by Florentino Mccormack MD at [facility] Endoscopy    CYSTOSCOPY N/A 3/2/2023    CYSTOSCOPY performed by Suresh Gold MD at [facility] OR    DIALYSIS FISTULA CREATION Left 12/27/2022    LEFT ARM ARM ARTERIO-VENOUS FISTULA CREATION performed by Nathaniel Callaway MD at [facility] OR    DILATATION, ESOPHAGUS      HERNIA REPAIR      x3 surgeries with 14 repairs    KNEE SURGERY Right 1980's    cartilage    NH EXPLORATORY LAPAROTOMY CELIOTOMY W/WO BIOPSY SPX N/A 2/5/2018    ABDOMINAL EXPLORATION WITH LYSIS OF COMPLICATED ADHESIONS WITH AN EXTENDED RIGHT COLON RESECTION performed by Patrick Bond MD at [facility] OR    UPPER GASTROINTESTINAL ENDOSCOPY N/A 7/7/2023    EGD performed by Florentino Mccormack MD at [facility] Endoscopy       CURRENT MEDICATIONS     Previous Medications    ALBUTEROL SULFATE  (90 BASE) MCG/ACT INHALER    Inhale 2 puffs into the lungs every 6 hours as needed for Wheezing or Shortness of Breath    AMLODIPINE (NORVASC) 10 MG TABLET    TAKE 1 TABLET BY MOUTH EVERY DAY    ASPIRIN EC 81 MG EC TABLET    Take 1 tablet by mouth daily    ATORVASTATIN (LIPITOR) 40 MG TABLET    TAKE 1 TABLET BY MOUTH EVERY DAY    BUMETANIDE (BUMEX) 2 MG TABLET    Take 1 tablet by mouth    CARVEDILOL (COREG) 25 MG TABLET    TAKE 1 TABLET TWICE DAILY    CLOPIDOGREL (PLAVIX) 75 MG TABLET    Take 1 tablet by mouth daily

## 2024-12-30 NOTE — ED NOTES
Patient resting in bed. Respirations easy and unlabored. No distress noted. Call light within reach. IV placed. Labs collected

## 2024-12-31 ENCOUNTER — APPOINTMENT (OUTPATIENT)
Age: 64
End: 2024-12-31
Payer: MEDICARE

## 2024-12-31 LAB
ANION GAP SERPL CALC-SCNC: 12 MEQ/L (ref 8–16)
BUN SERPL-MCNC: 27 MG/DL (ref 7–22)
CALCIUM SERPL-MCNC: 8.6 MG/DL (ref 8.5–10.5)
CHLORIDE SERPL-SCNC: 99 MEQ/L (ref 98–111)
CO2 SERPL-SCNC: 27 MEQ/L (ref 23–33)
CREAT SERPL-MCNC: 4.2 MG/DL (ref 0.4–1.2)
GFR SERPL CREATININE-BSD FRML MDRD: 15 ML/MIN/1.73M2
GLUCOSE SERPL-MCNC: 111 MG/DL (ref 70–108)
MAGNESIUM SERPL-MCNC: 1.8 MG/DL (ref 1.6–2.4)
POTASSIUM SERPL-SCNC: 3.8 MEQ/L (ref 3.5–5.2)
SODIUM SERPL-SCNC: 138 MEQ/L (ref 135–145)

## 2024-12-31 PROCEDURE — 6360000002 HC RX W HCPCS

## 2024-12-31 PROCEDURE — 83735 ASSAY OF MAGNESIUM: CPT

## 2024-12-31 PROCEDURE — 80048 BASIC METABOLIC PNL TOTAL CA: CPT

## 2024-12-31 PROCEDURE — C8923 2D TTE W OR W/O FOL W/CON,CO: HCPCS

## 2024-12-31 PROCEDURE — 96374 THER/PROPH/DIAG INJ IV PUSH: CPT

## 2024-12-31 PROCEDURE — 6370000000 HC RX 637 (ALT 250 FOR IP)

## 2024-12-31 PROCEDURE — 6360000004 HC RX CONTRAST MEDICATION: Performed by: INTERNAL MEDICINE

## 2024-12-31 PROCEDURE — G0378 HOSPITAL OBSERVATION PER HR: HCPCS

## 2024-12-31 PROCEDURE — 36415 COLL VENOUS BLD VENIPUNCTURE: CPT

## 2024-12-31 PROCEDURE — 2500000003 HC RX 250 WO HCPCS

## 2024-12-31 PROCEDURE — 96372 THER/PROPH/DIAG INJ SC/IM: CPT

## 2024-12-31 PROCEDURE — 90935 HEMODIALYSIS ONE EVALUATION: CPT

## 2024-12-31 PROCEDURE — 99233 SBSQ HOSP IP/OBS HIGH 50: CPT | Performed by: INTERNAL MEDICINE

## 2024-12-31 PROCEDURE — 6370000000 HC RX 637 (ALT 250 FOR IP): Performed by: INTERNAL MEDICINE

## 2024-12-31 PROCEDURE — 2580000003 HC RX 258: Performed by: INTERNAL MEDICINE

## 2024-12-31 RX ORDER — LIDOCAINE 4 G/G
2 PATCH TOPICAL DAILY
Status: DISCONTINUED | OUTPATIENT
Start: 2024-12-31 | End: 2025-01-01 | Stop reason: HOSPADM

## 2024-12-31 RX ORDER — OXYCODONE AND ACETAMINOPHEN 10; 325 MG/1; MG/1
1 TABLET ORAL
Status: COMPLETED | OUTPATIENT
Start: 2024-12-31 | End: 2024-12-31

## 2024-12-31 RX ORDER — HYDRALAZINE HYDROCHLORIDE 25 MG/1
25 TABLET, FILM COATED ORAL EVERY 8 HOURS SCHEDULED
Status: DISCONTINUED | OUTPATIENT
Start: 2024-12-31 | End: 2025-01-01 | Stop reason: HOSPADM

## 2024-12-31 RX ADMIN — AMLODIPINE BESYLATE 10 MG: 10 TABLET ORAL at 12:56

## 2024-12-31 RX ADMIN — OXYCODONE AND ACETAMINOPHEN 1 TABLET: 10; 325 TABLET ORAL at 16:22

## 2024-12-31 RX ADMIN — TAMSULOSIN HYDROCHLORIDE 0.4 MG: 0.4 CAPSULE ORAL at 12:55

## 2024-12-31 RX ADMIN — ATORVASTATIN CALCIUM 40 MG: 40 TABLET, FILM COATED ORAL at 12:56

## 2024-12-31 RX ADMIN — ROPINIROLE HYDROCHLORIDE 1 MG: 1 TABLET, FILM COATED ORAL at 20:27

## 2024-12-31 RX ADMIN — PRIMIDONE 50 MG: 50 TABLET ORAL at 20:21

## 2024-12-31 RX ADMIN — LEVOTHYROXINE SODIUM 200 MCG: 0.1 TABLET ORAL at 05:55

## 2024-12-31 RX ADMIN — OXYCODONE AND ACETAMINOPHEN 1 TABLET: 10; 325 TABLET ORAL at 12:53

## 2024-12-31 RX ADMIN — PANTOPRAZOLE SODIUM 40 MG: 40 TABLET, DELAYED RELEASE ORAL at 05:52

## 2024-12-31 RX ADMIN — TAMSULOSIN HYDROCHLORIDE 0.4 MG: 0.4 CAPSULE ORAL at 17:04

## 2024-12-31 RX ADMIN — SEVELAMER CARBONATE 800 MG: 800 TABLET, FILM COATED ORAL at 12:53

## 2024-12-31 RX ADMIN — HYDRALAZINE HYDROCHLORIDE 25 MG: 25 TABLET ORAL at 22:55

## 2024-12-31 RX ADMIN — GABAPENTIN 300 MG: 300 CAPSULE ORAL at 20:22

## 2024-12-31 RX ADMIN — PERFLUTREN 10 ML: 6.52 INJECTION, SUSPENSION INTRAVENOUS at 13:33

## 2024-12-31 RX ADMIN — BUMETANIDE 2 MG: 1 TABLET ORAL at 12:55

## 2024-12-31 RX ADMIN — MIRTAZAPINE 7.5 MG: 7.5 TABLET, FILM COATED ORAL at 20:21

## 2024-12-31 RX ADMIN — SODIUM CHLORIDE, PRESERVATIVE FREE 10 ML: 5 INJECTION INTRAVENOUS at 20:22

## 2024-12-31 RX ADMIN — SEVELAMER CARBONATE 800 MG: 800 TABLET, FILM COATED ORAL at 17:04

## 2024-12-31 RX ADMIN — SODIUM CHLORIDE, PRESERVATIVE FREE 10 ML: 5 INJECTION INTRAVENOUS at 12:58

## 2024-12-31 RX ADMIN — ENOXAPARIN SODIUM 30 MG: 100 INJECTION SUBCUTANEOUS at 12:59

## 2024-12-31 RX ADMIN — PRIMIDONE 100 MG: 50 TABLET ORAL at 12:55

## 2024-12-31 RX ADMIN — MONTELUKAST 10 MG: 10 TABLET, FILM COATED ORAL at 20:22

## 2024-12-31 RX ADMIN — ASPIRIN 81 MG: 81 TABLET, COATED ORAL at 12:53

## 2024-12-31 RX ADMIN — ONDANSETRON 4 MG: 2 INJECTION INTRAMUSCULAR; INTRAVENOUS at 05:51

## 2024-12-31 RX ADMIN — CARVEDILOL 25 MG: 25 TABLET, FILM COATED ORAL at 12:54

## 2024-12-31 RX ADMIN — CLOPIDOGREL BISULFATE 75 MG: 75 TABLET ORAL at 12:53

## 2024-12-31 RX ADMIN — CARVEDILOL 25 MG: 25 TABLET, FILM COATED ORAL at 20:21

## 2024-12-31 ASSESSMENT — PAIN DESCRIPTION - DESCRIPTORS
DESCRIPTORS: ACHING;DISCOMFORT
DESCRIPTORS: ACHING;DISCOMFORT

## 2024-12-31 ASSESSMENT — PAIN SCALES - WONG BAKER
WONGBAKER_NUMERICALRESPONSE: NO HURT

## 2024-12-31 ASSESSMENT — PAIN SCALES - GENERAL
PAINLEVEL_OUTOF10: 6
PAINLEVEL_OUTOF10: 7
PAINLEVEL_OUTOF10: 6
PAINLEVEL_OUTOF10: 2
PAINLEVEL_OUTOF10: 7
PAINLEVEL_OUTOF10: 2

## 2024-12-31 ASSESSMENT — PAIN DESCRIPTION - LOCATION
LOCATION: BACK
LOCATION: BACK
LOCATION: HEAD

## 2024-12-31 NOTE — FLOWSHEET NOTE
Stable 4 hour TX completed. Removed 3 l of fluid, tolerated fluid removal well. Pressure held to each needle site x 10min. Drsg clean, dry, and intact upon leaving unit. TX record printed for scanning into EMR. Report called to primary RN.   12/31/24 0800 12/31/24 1230   Vital Signs   /81 (!) 151/93   Temp 97.7 °F (36.5 °C) 97.7 °F (36.5 °C)   Pulse 63 65   Respirations 20 19   Weight - Scale 129.4 kg (285 lb 4.4 oz) 126.4 kg (278 lb 10.6 oz)   Weight Method Bed scale Bed scale   Percent Weight Change  --  -2.32   Post-Hemodialysis Assessment   Post-Treatment Procedures  --  Blood returned;Catheter Capped, clamped with Saline x2 ports   Machine Disinfection Process  --  Acid/Vinegar Clean;Heat Disinfect;Exterior Machine Disinfection   Blood Volume Processed (Liters)  --  80.3 L   Dialyzer Clearance  --  Lightly streaked   Heparin Amount Administered During Treatment (mL)  --  0 mL   Hemodialysis Intake (ml)  --  400 ml   Hemodialysis Output (ml)  --  3400 ml   NET Removed (ml)  --  3000

## 2024-12-31 NOTE — PLAN OF CARE
Problem: Chronic Conditions and Co-morbidities  Goal: Patient's chronic conditions and co-morbidity symptoms are monitored and maintained or improved  12/31/2024 0301 by Lisbeth Fields, RN  Outcome: Progressing  Flowsheets (Taken 12/31/2024 0202)  Care Plan - Patient's Chronic Conditions and Co-Morbidity Symptoms are Monitored and Maintained or Improved:   Monitor and assess patient's chronic conditions and comorbid symptoms for stability, deterioration, or improvement   Collaborate with multidisciplinary team to address chronic and comorbid conditions and prevent exacerbation or deterioration   Update acute care plan with appropriate goals if chronic or comorbid symptoms are exacerbated and prevent overall improvement and discharge     Problem: Discharge Planning  Goal: Discharge to home or other facility with appropriate resources  12/31/2024 0301 by Lisbeth Fields, RN  Outcome: Progressing  Flowsheets (Taken 12/31/2024 0202)  Discharge to home or other facility with appropriate resources: Identify barriers to discharge with patient and caregiver     Problem: Pain  Goal: Verbalizes/displays adequate comfort level or baseline comfort level  12/31/2024 0301 by Lisbeth Fields, RN  Outcome: Progressing  Flowsheets (Taken 12/31/2024 0002)  Verbalizes/displays adequate comfort level or baseline comfort level:   Encourage patient to monitor pain and request assistance   Assess pain using appropriate pain scale   Administer analgesics based on type and severity of pain and evaluate response   Implement non-pharmacological measures as appropriate and evaluate response     Problem: Safety - Adult  Goal: Free from fall injury  12/31/2024 0301 by Lisbeth Fields, RN  Outcome: Progressing   All fall precautions in place. Bed in low position, alarm activated and appropriate use of call light.      Problem: Respiratory - Adult  Goal: Achieves optimal ventilation and oxygenation  Outcome: Progressing  Flowsheets (Taken 12/31/2024

## 2024-12-31 NOTE — PLAN OF CARE
Problem: Chronic Conditions and Co-morbidities  Goal: Patient's chronic conditions and co-morbidity symptoms are monitored and maintained or improved  Outcome: Progressing     Problem: Discharge Planning  Goal: Discharge to home or other facility with appropriate resources  Outcome: Progressing     Problem: Pain  Goal: Verbalizes/displays adequate comfort level or baseline comfort level  Outcome: Progressing     Problem: Safety - Adult  Goal: Free from fall injury  Outcome: Progressing     Problem: Respiratory - Adult  Goal: Achieves optimal ventilation and oxygenation  Outcome: Progressing     Problem: Skin/Tissue Integrity - Adult  Goal: Skin integrity remains intact  Outcome: Progressing     Problem: Gastrointestinal - Adult  Goal: Minimal or absence of nausea and vomiting  Outcome: Progressing  Goal: Maintains or returns to baseline bowel function  Outcome: Progressing     Problem: Metabolic/Fluid and Electrolytes - Adult  Goal: Electrolytes maintained within normal limits  Outcome: Progressing     Problem: Hematologic - Adult  Goal: Maintains hematologic stability  Outcome: Progressing

## 2024-12-31 NOTE — CARE COORDINATION
Case Management Assessment Initial Evaluation    Date/Time of Evaluation: 2024 9:09 AM  Assessment Completed by: Edda Lim RN    If patient is discharged prior to next notation, then this note serves as note for discharge by case management.    Patient Name: Farhad Myles                   YOB: 1960  Diagnosis: Shortness of breath [R06.02]  Fluid overload [E87.70]  Edema of right lower extremity [R60.0]  Hypervolemia, unspecified hypervolemia type [E87.70]  Acute on chronic congestive heart failure, unspecified heart failure type (HCC) [I50.9]  Chronic congestive heart failure, unspecified heart failure type (HCC) [I50.9]                   Date / Time: 2024  7:32 PM  Location: [address] [place] [address]     Patient Admission Status: Observation   Readmission Risk Low 0-14, Mod 15-19), High > 20: Readmission Risk Score: 22.8    Current PCP: Dulce Maria Crespo MD  Health Care Decision Makers:   Primary Decision Maker: Micheal Myles - [facility] - [phone]    Additional Case Management Notes: Admitted to obs with fluid overload and dizziness with palpitations. Chronic home HD 4x week. Hospitalist and nephro following. PO bumex.     Procedures:    Echo ordered    Imagin/29 CXR:   1. Favor congestive failure/fluid overload. Cardiomegaly with central   venous congestion. Diffuse interstitial thickening throughout the   bilateral lungs likely representing interstitial edema.   LE doppler: negative    Patient Goals/Plan/Treatment Preferences: Spoke with Farhad, he is from home with his wife and current with [facility]  for therapy. He does home HD 4x weekly. Has home O2 from [facility], baseline is 4L NC. He does not anticipate further needs. Message sent to [facility] to update on admission. SW NOT consulted at this time.          [id number] [phone]   Service Assessment   Patient Orientation Alert and Oriented   Cognition Alert   History Provided By Patient   Primary Caregiver Self

## 2025-01-01 VITALS
BODY MASS INDEX: 37.74 KG/M2 | RESPIRATION RATE: 18 BRPM | SYSTOLIC BLOOD PRESSURE: 141 MMHG | TEMPERATURE: 98.5 F | OXYGEN SATURATION: 100 % | HEART RATE: 74 BPM | HEIGHT: 72 IN | DIASTOLIC BLOOD PRESSURE: 81 MMHG | WEIGHT: 278.66 LBS

## 2025-01-01 LAB
ANION GAP SERPL CALC-SCNC: 12 MEQ/L (ref 8–16)
BUN SERPL-MCNC: 24 MG/DL (ref 7–22)
CALCIUM SERPL-MCNC: 8.3 MG/DL (ref 8.5–10.5)
CHLORIDE SERPL-SCNC: 98 MEQ/L (ref 98–111)
CO2 SERPL-SCNC: 27 MEQ/L (ref 23–33)
CREAT SERPL-MCNC: 3.6 MG/DL (ref 0.4–1.2)
GFR SERPL CREATININE-BSD FRML MDRD: 18 ML/MIN/1.73M2
GLUCOSE SERPL-MCNC: 66 MG/DL (ref 70–108)
MAGNESIUM SERPL-MCNC: 1.7 MG/DL (ref 1.6–2.4)
POTASSIUM SERPL-SCNC: 4 MEQ/L (ref 3.5–5.2)
SODIUM SERPL-SCNC: 137 MEQ/L (ref 135–145)

## 2025-01-01 PROCEDURE — G0378 HOSPITAL OBSERVATION PER HR: HCPCS

## 2025-01-01 PROCEDURE — 83735 ASSAY OF MAGNESIUM: CPT

## 2025-01-01 PROCEDURE — 6370000000 HC RX 637 (ALT 250 FOR IP)

## 2025-01-01 PROCEDURE — 36415 COLL VENOUS BLD VENIPUNCTURE: CPT

## 2025-01-01 PROCEDURE — 80048 BASIC METABOLIC PNL TOTAL CA: CPT

## 2025-01-01 PROCEDURE — 6360000002 HC RX W HCPCS

## 2025-01-01 PROCEDURE — 6370000000 HC RX 637 (ALT 250 FOR IP): Performed by: INTERNAL MEDICINE

## 2025-01-01 PROCEDURE — 96372 THER/PROPH/DIAG INJ SC/IM: CPT

## 2025-01-01 PROCEDURE — 99239 HOSP IP/OBS DSCHRG MGMT >30: CPT | Performed by: INTERNAL MEDICINE

## 2025-01-01 PROCEDURE — 2500000003 HC RX 250 WO HCPCS

## 2025-01-01 RX ORDER — LIDOCAINE 4 G/G
2 PATCH TOPICAL DAILY
Qty: 60 PATCH | Refills: 0 | COMMUNITY
Start: 2025-01-02

## 2025-01-01 RX ORDER — GABAPENTIN 300 MG/1
300 CAPSULE ORAL NIGHTLY
Qty: 30 CAPSULE | Refills: 1 | Status: SHIPPED | OUTPATIENT
Start: 2025-01-01 | End: 2025-03-02

## 2025-01-01 RX ORDER — VENLAFAXINE HYDROCHLORIDE 150 MG/1
150 CAPSULE, EXTENDED RELEASE ORAL DAILY
Qty: 30 CAPSULE | Refills: 1 | Status: SHIPPED | OUTPATIENT
Start: 2025-01-01 | End: 2025-03-02

## 2025-01-01 RX ADMIN — OXYCODONE AND ACETAMINOPHEN 1 TABLET: 10; 325 TABLET ORAL at 00:27

## 2025-01-01 RX ADMIN — SEVELAMER CARBONATE 800 MG: 800 TABLET, FILM COATED ORAL at 11:27

## 2025-01-01 RX ADMIN — CLOPIDOGREL BISULFATE 75 MG: 75 TABLET ORAL at 08:16

## 2025-01-01 RX ADMIN — HYDRALAZINE HYDROCHLORIDE 25 MG: 25 TABLET ORAL at 05:57

## 2025-01-01 RX ADMIN — SODIUM CHLORIDE, PRESERVATIVE FREE 10 ML: 5 INJECTION INTRAVENOUS at 08:16

## 2025-01-01 RX ADMIN — BUMETANIDE 2 MG: 1 TABLET ORAL at 08:16

## 2025-01-01 RX ADMIN — CARVEDILOL 25 MG: 25 TABLET, FILM COATED ORAL at 08:16

## 2025-01-01 RX ADMIN — ATORVASTATIN CALCIUM 40 MG: 40 TABLET, FILM COATED ORAL at 08:16

## 2025-01-01 RX ADMIN — SEVELAMER CARBONATE 800 MG: 800 TABLET, FILM COATED ORAL at 08:15

## 2025-01-01 RX ADMIN — AMLODIPINE BESYLATE 10 MG: 10 TABLET ORAL at 08:16

## 2025-01-01 RX ADMIN — TAMSULOSIN HYDROCHLORIDE 0.4 MG: 0.4 CAPSULE ORAL at 08:16

## 2025-01-01 RX ADMIN — POLYETHYLENE GLYCOL 3350 17 G: 17 POWDER, FOR SOLUTION ORAL at 08:14

## 2025-01-01 RX ADMIN — LEVOTHYROXINE SODIUM 200 MCG: 0.1 TABLET ORAL at 05:57

## 2025-01-01 RX ADMIN — PANTOPRAZOLE SODIUM 40 MG: 40 TABLET, DELAYED RELEASE ORAL at 05:58

## 2025-01-01 RX ADMIN — ASPIRIN 81 MG: 81 TABLET, COATED ORAL at 08:16

## 2025-01-01 RX ADMIN — ENOXAPARIN SODIUM 30 MG: 100 INJECTION SUBCUTANEOUS at 08:15

## 2025-01-01 RX ADMIN — PRIMIDONE 100 MG: 50 TABLET ORAL at 08:16

## 2025-01-01 ASSESSMENT — PAIN SCALES - GENERAL
PAINLEVEL_OUTOF10: 3
PAINLEVEL_OUTOF10: 7

## 2025-01-01 ASSESSMENT — PAIN DESCRIPTION - LOCATION: LOCATION: GENERALIZED

## 2025-01-01 ASSESSMENT — PAIN SCALES - WONG BAKER: WONGBAKER_NUMERICALRESPONSE: NO HURT

## 2025-01-01 NOTE — DISCHARGE SUMMARY
Hospital Medicine Discharge Summary      Patient Identification:   Farhad Myles   : 1960  MRN: [id number]   Account: [id number]   Patient's PCP: Dulce Maria Crespo MD    Admit Date: 2024   Discharge Date:   2025      Admitting Physician: No admitting provider for patient encounter.  Discharge Physician: Anthony Saleem MD       Discharge Diagnoses:  Acute on chronic diastolic CHF - repeat echo results pending , but did review two other echo's from    ESRD on HHD, with ? Compliance, he was encouraged to be compliant with HHD regimen 4 times per week per Renal team.  He responded well to HD as in patient, however he does not want to come in for this as out patient, Renal team seen and I spoke with dr. Pedroza, she was ok with disch plans for today  Obesity dietary and life style changes recommended.   HTN continue with out patient regimen ,  - 140   Chronic respiratory failure on home oxygen   Tachycardia resolved.       Hospital Course:       Farhad Myles is a 64 y.o. male with PMHx ESRD on HHD admitted to [facility] on 2024 for dizziness and palpitations, and work up revealed mild CHF, he was diuresed and nephrology consulted,   And they initiated HD , normally on HHD but been non compliant due to non specific symptoms. He was encouraged to continue with nephrology   Follow up and HHD on a regular basis, 4 times per week .   Today he feels great sitting in the recliner, had two rounds of HD and will be discharged home today .  He is tolerating diet and no anginal symptoms.         The patient was seen and examined on day of discharge and this discharge summary is in conjunction with any daily progress note from day of discharge.    The patient is discharged in stable condition.       Exam:   Vitals:  Vitals:    25 0027 25 0057 25 0545 25 0805   BP:   135/81 (!) 141/81   Pulse:    74   Resp: 18 18  18   Temp:    98.5 °F (36.9

## 2025-01-01 NOTE — PROGRESS NOTES
Pharmacy Renal Adjustment    Pharmacy renally adjusted the following medication(s) per P&T approved policy: gabapentin    Recent Labs     12/29/24 2025 12/30/24  0745   BUN 42* 43*   CREATININE 4.4* 5.1*     eGFR:   Recent Labs     12/29/24 2025 12/30/24  0745   LABGLOM 14* 12*     Estimated Creatinine Clearance: 21 mL/min (A) (based on SCr of 5.1 mg/dL (HH)).    Assessment:  ESRD on HD    Plan:   Decrease gabapentin from 600 mg to 300 mg nightly  Please call pharmacy with any questions.    Thomas Murrell) , PharmD 12/30/2024 2:21 PM      
    Hospitalist Progress Note      Patient:  Farhad Myles 64 y.o. male     Unit/Bed:3A-05/005-A    Date of Admission: 12/29/2024      ASSESSMENT AND PLAN    Active Problems  Dizziness and palpitaions  ESRD was on HHD , but failued   CAD  HTN  DM-2  LAURIE   History of CHFpEF    Pulmonary edema, clinically better after HD last two days.  Spoke with him and his wife, he was reluctant to come in   For HD at [facility] as outpatient . No CP or SOB, does make some urine  No active GI symptoms or LOC, fever or chills.     Chronic Conditions (reviewed and stable unless otherwise stated)  Chronic resp failure  Tobacco use  HTN  CAD      LDA: []CVC / []PICC / []Midline / []Martinez / []Drains / []Mediport / [x]None  Antibiotics: yes refer to ID noteds  Steroids: yes initiated in ICU  Labs (still needed?): [x]Yes / []No  IVF (still needed?): []Yes / [x]No    Level of care: [x]Step Down / []Med-Surg  Bed Status: []Inpatient / []Observation  Telemetry: []Yes / []No  PT/OT: []Yes / []No    DVT Prophylaxis: [] Lovenox / [] Heparin / [] SCDs / [x] Already on Systemic Anticoagulation / [] None   Code status: Full Code     Expected discharge date:  TBF         ===================================================================    Chief Complaint: dizziness and palpitations   Subjective (past 24 hours): claims he is feeling better  post HD today   spouse is at bedside. , complains of back pain , no abd pain or N/V  Normally he has BM's Q 3 - 4 days        HPI / Hospital Course:  The patient is a 64 y.o. male seen in renal consult for ESRD. He does HHD 4 days a week under the care of Dr. Em.  Additional hx includes CAD, HTN, DM, HTN, LAURIE, HFpEF, chronic hypoxic resp failure on 4 L home O2. He presented to the hospital with dizziness and palpitations after his dialysis tx on Saturday.  He has not been compliant with his dialysis recently and was 10 kg above his dry weight and SOB. It was recommended last week for him to go to the ED which 
Echo with definity contrast was completed at bedside. Dr. George to read. At bedside 1:05-1:35.  
[facility]   Nephrology progress note  12/31/2024, 11:16 AM      Pt Name:    Farhad Myles  MRN:     [id number]     YOB: 1960  Admit Date:    12/29/2024  7:32 PM    Chief Complaint: Nephrology following for ESRD.    Subjective:  Patient was seen and examined this morning on HD.   Bp 163/92 UF 3 liters should get him to dry weight, slightly below. His breathing is better.  Objective:  24HR INTAKE/OUTPUT:    Intake/Output Summary (Last 24 hours) at 12/31/2024 1116  Last data filed at 12/31/2024 0345  Gross per 24 hour   Intake 400 ml   Output 5425 ml   Net -5025 ml         I/O last 3 completed shifts:  In: 400   Out: 5425 [Urine:1025]  No intake/output data recorded.   Admission weight: 132 kg (291 lb)  Wt Readings from Last 3 Encounters:   12/31/24 129.4 kg (285 lb 4.4 oz)   12/18/24 132 kg (291 lb 0.1 oz)   12/02/24 132 kg (291 lb)        Vitals :   Vitals:    12/31/24 0345 12/31/24 0600 12/31/24 0745 12/31/24 0800   BP: (!) 159/74  (!) 152/73 138/81   Pulse:    63   Resp:    20   Temp:   98 °F (36.7 °C) 97.7 °F (36.5 °C)   TempSrc:       SpO2:   98%    Weight:  130.5 kg (287 lb 11.2 oz)  129.4 kg (285 lb 4.4 oz)   Height:           Physical examination  General Appearance: alert and cooperative with exam, appears comfortable, no distress  Mouth/Throat: Oral mucosa moist  Neck: No JVD  Lungs: diminished  Heart:  S1, S2 heard  GI: soft, non-tender, no guarding  Extremities: improved leg edema    Medications:  Infusion:    sodium chloride       Meds:    sodium chloride flush  5-40 mL IntraVENous 2 times per day    enoxaparin  30 mg SubCUTAneous Daily    polyethylene glycol  17 g Oral Daily    amLODIPine  10 mg Oral Daily    aspirin EC  81 mg Oral Daily    atorvastatin  40 mg Oral Daily    bumetanide  2 mg Oral Daily    carvedilol  25 mg Oral BID    clopidogrel  75 mg Oral Daily    levothyroxine  200 mcg Oral QAM AC    mirtazapine  7.5 mg Oral Nightly    montelukast  10 mg Oral 
Patient out of room. Will check back as able to complete echocardiogram.  
Report given to apm RN 6K patient and belongings will transfer after meal is consumed  
Nightly    pantoprazole  40 mg Oral QAM AC    primidone  100 mg Oral Daily    primidone  50 mg Oral QHS    rOPINIRole  1 mg Oral Nightly    sevelamer  800 mg Oral TID WC    tamsulosin  0.4 mg Oral BID    gabapentin  300 mg Oral Nightly     Meds prn: sodium chloride flush, sodium chloride, ondansetron **OR** ondansetron, acetaminophen **OR** acetaminophen, melatonin, oxyCODONE-acetaminophen, albuterol sulfate HFA     Lab Data :  CBC:   Recent Labs     12/29/24 2025 12/30/24  0745   WBC 5.9 6.2   HGB 10.1* 10.1*   HCT 30.6* 31.4*    157     CMP:  Recent Labs     12/29/24 2025 12/30/24  0745 12/31/24  0551 01/01/25  0624    138 138 137   K 4.1 3.7 3.8 4.0    99 99 98   CO2 29 29 27 27   BUN 42* 43* 27* 24*   CREATININE 4.4* 5.1* 4.2* 3.6*   GLUCOSE 84 71 111* 66*   CALCIUM 8.7 8.7 8.6 8.3*   MG 1.8 1.7 1.8 1.7   PHOS 3.6  --   --   --      Hepatic:   Recent Labs     12/30/24  0745   AST 13   ALT 11   BILITOT 0.3   ALKPHOS 78         Assessment and Plan:  SRD on HHD 4 x weekly  Had been noncompliant with his dialysis treatments recently  Presented fluid overloaded had HD x 2now back to EDW  Ok for DC From renal standpoint  Volume overload: improving  Lytes: stable  Palpitations: during HD. Echo pending  Chronic hypoxic resp failure  HFpEF  HTN  Anemia in CKD  hyperphosphatemia      D/W patient and hospitalist    Leonie Pedroza DO  [facility]    This report has been created using voice recognition software. It may contain minor errors which are inherent in voice recognition technology   
appreciated.  Respiratory:  Normal effort. Clear to auscultation, without rales or wheezes or rhonchi.  Cardiovascular: Normal rate, regular rhythm with normal S1/S2 without murmurs.    No lower extremity edema.   Abdomen: Soft, non-tender, non-distended with normal bowel sounds.  Musculoskeletal: No joint swelling or tenderness. Normal tone. No abnormal movements.   Skin: Warm and dry. No rashes or lesions.  Neurologic:  No focal sensory/motor deficits in the upper or lower extremities. Cranial nerves:  grossly non-focal 2-12.     Psychiatric: Alert and oriented, normal insight and thought content.   Capillary Refill: Brisk,< 3 seconds.  Peripheral Pulses: +2 palpable, equal bilaterally.       Labs/Radiology: See chart or assessment above.     Electronically signed by Anthony Saleem MD on 12/31/2024 at 9:35 PM

## 2025-01-01 NOTE — PLAN OF CARE
Problem: Chronic Conditions and Co-morbidities  Goal: Patient's chronic conditions and co-morbidity symptoms are monitored and maintained or improved  12/31/2024 2240 by Ivory Ramires RN  Outcome: Progressing  Flowsheets (Taken 12/31/2024 0202 by Lisbeth Fields, RN)  Care Plan - Patient's Chronic Conditions and Co-Morbidity Symptoms are Monitored and Maintained or Improved:   Monitor and assess patient's chronic conditions and comorbid symptoms for stability, deterioration, or improvement   Collaborate with multidisciplinary team to address chronic and comorbid conditions and prevent exacerbation or deterioration   Update acute care plan with appropriate goals if chronic or comorbid symptoms are exacerbated and prevent overall improvement and discharge  12/31/2024 1737 by Ragini Manley RN  Outcome: Progressing     Problem: Discharge Planning  Goal: Discharge to home or other facility with appropriate resources  12/31/2024 2240 by Ivory Ramires RN  Outcome: Progressing  Flowsheets (Taken 12/31/2024 0202 by Lisbeth Fields, RN)  Discharge to home or other facility with appropriate resources: Identify barriers to discharge with patient and caregiver  12/31/2024 1737 by Ragini Manley, RN  Outcome: Progressing     Problem: Pain  Goal: Verbalizes/displays adequate comfort level or baseline comfort level  12/31/2024 2240 by Ivory Ramires RN  Outcome: Progressing  Flowsheets (Taken 12/31/2024 0002 by Lisbeth Fields, RN)  Verbalizes/displays adequate comfort level or baseline comfort level:   Encourage patient to monitor pain and request assistance   Assess pain using appropriate pain scale   Administer analgesics based on type and severity of pain and evaluate response   Implement non-pharmacological measures as appropriate and evaluate response  12/31/2024 1737 by Ragini Manley, RN  Outcome: Progressing     Problem: Safety - Adult  Goal: Free from fall injury  12/31/2024 2240 by

## 2025-01-02 ENCOUNTER — TELEPHONE (OUTPATIENT)
Dept: INTERNAL MEDICINE CLINIC | Age: 65
End: 2025-01-02

## 2025-01-02 ENCOUNTER — CARE COORDINATION (OUTPATIENT)
Dept: CASE MANAGEMENT | Age: 65
End: 2025-01-02

## 2025-01-02 LAB
ECHO AV CUSP MM: 1.8 CM
ECHO BSA: 2.59 M2
ECHO LA DIAMETER INDEX: 1.63 CM/M2
ECHO LA DIAMETER: 4 CM
ECHO LV EDV A4C: 242 ML
ECHO LV EDV INDEX A4C: 99 ML/M2
ECHO LV EF PHYSICIAN: 50 %
ECHO LV EJECTION FRACTION A4C: 50 %
ECHO LV EJECTION FRACTION BIPLANE: 50 % (ref 55–100)
ECHO LV ESV A4C: 122 ML
ECHO LV ESV INDEX A4C: 50 ML/M2
ECHO LV FRACTIONAL SHORTENING: 23 % (ref 28–44)
ECHO LV INTERNAL DIMENSION DIASTOLE INDEX: 2.45 CM/M2
ECHO LV INTERNAL DIMENSION DIASTOLIC: 6 CM (ref 4.2–5.9)
ECHO LV INTERNAL DIMENSION SYSTOLIC INDEX: 1.88 CM/M2
ECHO LV INTERNAL DIMENSION SYSTOLIC: 4.6 CM
ECHO LV IVSD: 1.3 CM (ref 0.6–1)
ECHO LV MASS 2D: 331.8 G (ref 88–224)
ECHO LV MASS INDEX 2D: 135.4 G/M2 (ref 49–115)
ECHO LV POSTERIOR WALL DIASTOLIC: 1.2 CM (ref 0.6–1)
ECHO LV RELATIVE WALL THICKNESS RATIO: 0.4
ECHO RV INTERNAL DIMENSION: 3.6 CM
ECHO RV TAPSE: 1.6 CM (ref 1.7–?)

## 2025-01-02 NOTE — CARE COORDINATION
Care Transitions Note    Initial Call - Call within 2 business days of discharge: Yes    Attempted to reach patient for transitions of care follow up. Unable to reach patient.    Outreach Attempts:   HIPAA compliant voicemail left for patient.     Contacted [facility].  Spoke with Elena.  Pt is current with them.  She confirmed they are aware pt was discharged and they are working on it.      1st attempt to contact pt for initial care transition follow up.  Unable to reach pt.  Left message with contact information and request for call back.      Patient: Farhad Myles    Patient : 1960   MRN: [id number]    Reason for Admission: Dizziness, Palpitations, Acute on chronic CHF  Discharge Date: 25  RURS: Readmission Risk Score: 22.8    Last Discharge Facility       Date Complaint Diagnosis Description Type Department Provider    24 Dizziness; Palpitations Acute on chronic congestive heart failure, unspecified heart failure type (HCC) ... ED to Hosp-Admission (Discharged) (ADMITTED) Anthony Mi MD; Harry Pryor..            Was this an external facility discharge? No    Follow Up Appointment:   Patient does not have a follow up appointment scheduled at time of call.   Future Appointments         Provider Specialty Dept Phone    2025 11:00 AM Dulce Maria Crespo MD Internal Medicine [phone]    2025 2:00 PM Joel Holcomb, APRN - CNP Physical Medicine and Rehab [phone]    3/4/2025 2:30 PM Rasheed Jade, APRN - CNP Pulmonology [phone]    3/27/2025 2:00 PM Marco George MD Cardiology [phone]    2025 3:00 PM Aaliyah Gonzales MD Rheumatology [phone]    4/10/2025 2:30 PM Cally Yang, APRN - CNP Urology [phone]    5/15/2025 2:40 PM Tiff Pierre, APRN - CNP Pulmonology [phone]            Plan for follow-up on next business day.      Tavia Roach RN

## 2025-01-02 NOTE — TELEPHONE ENCOUNTER
Have the medications prescribed at discharge been filled? N/A  Does the patient understand what the medications are for? Yes  Has the patient experienced any new symptoms or have previous symptoms worsened? No  Is the patient experiencing any pain? Yes If yes, is the pain well controlled? Pain not new .   We want to be sure the patient has the best recovery possible. Does the patient understand all the discharge instructions? Yes   Did someone talk to the patient and/or family about the patient needs prior to being discharged? Yes    Did the patient's doctor communicate well? Yes  Did the patient's nurse communicate well? Yes  Was the patient satisfied with the services and care received at [facility]? Yes

## 2025-01-02 NOTE — TELEPHONE ENCOUNTER
Care Transitions Initial Follow Up Call    Call within 2 business days of discharge: Yes     Patient: Farhad Myles Patient : 1960 MRN: [id number]    [unfilled]    RARS: Readmission Risk Score: 22.8       Spoke with: Farhad Myles    Discharge department/facility: [facility]     Non-face-to-face services provided: Transition if care questions       Follow Up  Future Appointments   Date Time Provider Department Center   2025 11:00 AM Dulce Maria Crespo MD [facility] Physic BS [facility] DEP   2025  2:00 PM Joel Holcomb APRN - CNP N [facility] Pain P - [place]   3/4/2025  2:30 PM Rasheed Jade APRN - CNP N Pulm Med [facility] - [place]   3/27/2025  2:00 PM Marco George MD N [facility] Heart P - [place]   2025  3:00 PM Aaliyah Gonzales MD [facility] RHEUM P - [place]   4/10/2025  2:30 PM Cally Yang APRN - CNP N [facility] Del U P - [place]   5/15/2025  2:40 PM Tiff Pierre APRN - CNP N Pulm Med [facility] - [place]       DELILAH VERA LPN

## 2025-01-03 ENCOUNTER — CARE COORDINATION (OUTPATIENT)
Dept: CASE MANAGEMENT | Age: 65
End: 2025-01-03

## 2025-01-03 DIAGNOSIS — Z99.2 ADMISSION FOR DIALYSIS (HCC): Primary | ICD-10-CM

## 2025-01-03 PROCEDURE — 1111F DSCHRG MED/CURRENT MED MERGE: CPT | Performed by: INTERNAL MEDICINE

## 2025-01-03 NOTE — CARE COORDINATION
Care Transitions Note    Initial Call - Call within 2 business days of discharge: Yes    Patient Current Location:  Home: [address] [place] [id number]    Care Transition Nurse contacted the patient by telephone to perform post hospital discharge assessment, verified name and  as identifiers. Provided introduction to self, and explanation of the Care Transition Nurse role.     Patient: Farhad Myles    Patient : 1960   MRN: [id number]    Reason for Admission: Dizziness, Palpitations, Acute on chronic CHF  Discharge Date: 25  RURS: Readmission Risk Score: 22.8      Last Discharge Facility       Date Complaint Diagnosis Description Type Department Provider    24 Dizziness; Palpitations Acute on chronic congestive heart failure, unspecified heart failure type (HCC) ... ED to Hosp-Admission (Discharged) (ADMITTED) Anthony Mi MD; Harry Pryor.            Was this an external facility discharge? No    Additional needs identified to be addressed with provider   No needs identified             Method of communication with provider: none.    Patients top risk factors for readmission: functional physical ability, lack of knowledge about disease, medical condition-acute on chronic CHF, CKD, Chronic respiratory failure with hypoxia, ESRD on home dialysis, Volume overload, polypharmacy, and compliance with home dialysis    Interventions to address risk factors:   Education: compliance with home dialysis (should be doing M,T, TH,F), monitoring weight, swelling, SpO2, BP daily, when to contact providers, importance of 7 day follow up with provider  Review of patient management of conditions/medications:    [facility] Health Care[facility]    Care Summary Note: Contacted pt for initial care transition follow up.  Farhad states he is doing better.  Denies having any further dizziness.  Has not had any palpitations.  Reports shortness of breath is still present.  Has improved since being in

## 2025-01-07 ENCOUNTER — CARE COORDINATION (OUTPATIENT)
Dept: CASE MANAGEMENT | Age: 65
End: 2025-01-07

## 2025-01-07 ENCOUNTER — TELEPHONE (OUTPATIENT)
Dept: CARDIOLOGY CLINIC | Age: 65
End: 2025-01-07

## 2025-01-07 NOTE — TELEPHONE ENCOUNTER
----- Message from SAHIL Hitchcock CNP sent at 1/7/2025  8:08 AM EST -----  ECHO reviewed - no systolic/diastolic dysfunction noted.  Being past pt I would just call in case he's expecting one and let him know nothing from our end to change/help.  Continue w/ Doris  ----- Message -----  From: Sonia Shields RN  Sent: 1/7/2025   6:50 AM EST  To: SAHIL Olivo CNP    Please advise regarding CHF IP referral

## 2025-01-07 NOTE — TELEPHONE ENCOUNTER
----- Message from SAHIL Hitchcock - CNP sent at 1/7/2025  8:08 AM EST -----  ECHO reviewed - no systolic/diastolic dysfunction noted.  Being past pt I would just call in case he's expecting one and let him know nothing from our end to change/help.  Continue w/ Doris

## 2025-01-07 NOTE — CARE COORDINATION
Care Transitions Note    Follow Up Call     Patient Current Location:  Home: [address] [address]    Care Transition Nurse contacted the patient by telephone. Verified name and  as identifiers.    Additional needs identified to be addressed with provider   No needs identified                 Method of communication with provider: none.    Care Summary Note: CTN call to Farhad today and he says he is feeling better. Confirms he is doing HD MTTHF. Says it's like a full-time job. Encouragement given.  Says he will be starting the transplant process 25 in [place].  Denies dizziness, sob, chest pain, fever, chills, n/v/d, swelling, malaise.  O2 on cont nc 4l/min. pO2=>90%  LZ=158/70-80  Says he weighs himself before and after HD.  [facility] [place]. This writer spoke w/ office and he is scheduled to be seen tomorrow. They will call pt. First to confirm.  C/o dull headache starting yesterday. Using cold compresses to alleviate.  Take pain med as directed for generalized chronic pain.  Eating, drinking & sleeping ok. Denies problems w/ bowels. Has increased fresh fruit intake and that seems to help.  Produces very little urine.  PCP HFU 25  Denies need for transportation.  No other concerns voiced at this time. Will continue to follow.      Plan of care updates since last contact:  Review of patient management of conditions/medications: CHF ESRD w/ HD-> HD MTTHF monitor wt. And BP  Home Health: [facility] active 25        Advance Care Planning:   Does patient have an Advance Directive: reviewed during previous call, see note. .    Medication Review:  No changes since last call.     Remote Patient Monitoring:  Offered patient enrollment in the Remote Patient Monitoring (RPM) program for in-home monitoring: Yes, but did not enroll at this time: declined to enroll in the program becauseprefers to self monitor .    Assessments:  Care Transitions ED Follow Up    Care Transitions  Refill requested for:      naproxen (NAPROSYN) 500 MG tablet 40 tablet 0 2/17/2021     Sig: TAKE 1 TABLET BY MOUTH TWO TIMES A DAY as needed.       Last office visit:     02/18/2021 Blegen Dysuria                                 02/15/2021  Right Foot Pain               Medication refilled per protocol.

## 2025-01-14 ENCOUNTER — CARE COORDINATION (OUTPATIENT)
Dept: CASE MANAGEMENT | Age: 65
End: 2025-01-14

## 2025-01-14 NOTE — CARE COORDINATION
.Care Transitions Note    Follow Up Call     Patient Current Location:  [place]    Care Transition Nurse contacted the patient by telephone. Verified name and  as identifiers.    Additional needs identified to be addressed with provider   No needs identified                 Method of communication with provider: none.    Care Summary Note: CTN call to Farhad today and he says he is on his way to [place] to start the transplant process.  Says HD MTTHF going well.  Denies dizziness, swelling, sob, chest pain, fever, chills, n/v/d, malaise.  pO2=>90% VN=293's/70's  Says he has headache on & off-cold compresses take the edge off.  Eating, drinking & sleeping ok. Denies problems w/ bowels. Still urinates some.  [facility] active came last week.  PCP HFU 25  Weighs before & after HD.  No other concerns voiced at this time. Will continue to follow.    Plan of care updates since last contact:  Review of patient management of conditions/medications: ESRD w/ HD, CHF, HTN-> HD MTTHF, monitor wt., BP, pO2%; started transplant process 25       Advance Care Planning:   Does patient have an Advance Directive: reviewed during previous call, see note. .    Medication Review:  No changes since last call.     Remote Patient Monitoring:  Offered patient enrollment in the Remote Patient Monitoring (RPM) program for in-home monitoring: Yes, but did not enroll at this time: declined to enroll in the program becauseprefers to self monitor .    Assessments:  Care Transitions ED Follow Up    Care Transitions Interventions    Registered Dietician: Declined    Disease Specific Clinic: Completed      Disease Association: Completed      Schedule Follow Up Appointment with Physician: Completed  Do you have any ongoing symptoms?: No   Do you have all of your prescriptions and are they filled?: Yes   Were you discharged with any Home Care or Post Acute Services or do you currently have any active services?: Yes   Post Acute Services: Home

## 2025-01-22 ENCOUNTER — CARE COORDINATION (OUTPATIENT)
Dept: CASE MANAGEMENT | Age: 65
End: 2025-01-22

## 2025-01-22 NOTE — CARE COORDINATION
Care Transitions Note    Follow Up Call     Patient Current Location:  Home: [address] [address]    Care Transition Nurse contacted the patient by telephone. Verified name and  as identifiers.    Additional needs identified to be addressed with provider   No needs identified                 Method of communication with provider: none.    Care Summary Note: CTN call to Farhad today and he says he is feeling alright.  Says he was very disappointed w/ the outcome of his appt. W/ [place] [facility] Team. They informed him that he is not a candidate d/t being on O2.  He will discuss w/ PCP if it's possible for him to wean off of the O2.  HD going well. He says he gets cramps in his legs & feet in the last hour of the HD.  Denies headaches, dizziness, sob, chest pain, fever, chills, n/v/d, malaise.  Says he has some LE on & off.  PCP HFU 25  Denies need for transportation.  [facility]/PT active.  pO2=>90% w/ O2 at 4l/min. MZ=473/80's  Eating, drinking & sleeping ok. Denies problems w/ urination/bowels. No other concerns voiced at this time. Will continue to follow.      Plan of care updates since last contact:  Review of patient management of conditions/medications: ac CHF ESRD-> home HD PCP HFU 25       Advance Care Planning:   Does patient have an Advance Directive: reviewed during previous call, see note. .    Medication Review:  No changes since last call.     Remote Patient Monitoring:  Offered patient enrollment in the Remote Patient Monitoring (RPM) program for in-home monitoring: Yes, but did not enroll at this time: declined to enroll in the program becauseprefers to self monitor .    Assessments:  Care Transitions ED Follow Up    Care Transitions Interventions   Home Care Waiver: Completed Physical Therapy: Completed      Registered Dietician: Declined    Disease Specific Clinic: Completed      Disease Association: Completed        Do you have any ongoing symptoms?: No   Do you have

## 2025-01-24 DIAGNOSIS — K21.9 GASTROESOPHAGEAL REFLUX DISEASE WITHOUT ESOPHAGITIS: ICD-10-CM

## 2025-01-24 RX ORDER — CLOPIDOGREL BISULFATE 75 MG/1
75 TABLET ORAL DAILY
Qty: 90 TABLET | Refills: 0 | Status: SHIPPED | OUTPATIENT
Start: 2025-01-24

## 2025-01-24 RX ORDER — PANTOPRAZOLE SODIUM 20 MG/1
20 TABLET, DELAYED RELEASE ORAL
Qty: 90 TABLET | Refills: 3 | Status: SHIPPED | OUTPATIENT
Start: 2025-01-24

## 2025-01-28 DIAGNOSIS — G62.9 NEUROPATHY: ICD-10-CM

## 2025-01-28 DIAGNOSIS — M25.562 CHRONIC PAIN OF BOTH KNEES: ICD-10-CM

## 2025-01-28 DIAGNOSIS — G89.4 CHRONIC PAIN SYNDROME: ICD-10-CM

## 2025-01-28 DIAGNOSIS — M17.0 PRIMARY OSTEOARTHRITIS OF BOTH KNEES: ICD-10-CM

## 2025-01-28 DIAGNOSIS — M48.062 SPINAL STENOSIS OF LUMBAR REGION WITH NEUROGENIC CLAUDICATION: ICD-10-CM

## 2025-01-28 DIAGNOSIS — G89.29 CHRONIC PAIN OF BOTH KNEES: ICD-10-CM

## 2025-01-28 DIAGNOSIS — M25.561 CHRONIC PAIN OF BOTH KNEES: ICD-10-CM

## 2025-01-28 DIAGNOSIS — M47.816 SPONDYLOSIS OF LUMBAR REGION WITHOUT MYELOPATHY OR RADICULOPATHY: ICD-10-CM

## 2025-01-28 DIAGNOSIS — M47.816 LUMBAR FACET ARTHROPATHY: ICD-10-CM

## 2025-01-28 RX ORDER — OXYCODONE AND ACETAMINOPHEN 10; 325 MG/1; MG/1
1 TABLET ORAL EVERY 8 HOURS PRN
Qty: 90 TABLET | Refills: 0 | Status: SHIPPED | OUTPATIENT
Start: 2025-01-28 | End: 2025-02-27

## 2025-01-28 NOTE — TELEPHONE ENCOUNTER
OARRS reviewed. UDS: + for  . Oxycodone,gabapentin, phenobarbital. Negative sertraline  Last seen: 12/18/2024. Follow-up:   Future Appointments   Date Time Provider Department Center   2/4/2025 11:00 AM Dulce Maria Crespo MD SRPX Physic [facility]   2/18/2025  2:00 PM Joel Holcomb APRN - CNP N SRPX Pain [facility] [place]   3/4/2025  2:30 PM Rasheed Jade APRN - CNP N Pulm Med [facility]   3/27/2025  2:00 PM Marco George MD N SRPX Heart [facility] [place]   4/1/2025  3:00 PM Aaliyah Gonzales MD SRPX RHEUM [facility] [place]   4/10/2025  2:30 PM Cally Yang APRN - CNP N SRPX Del U [facility] [place]   5/15/2025  2:40 PM Tiff Pierre APRN - CNP N Pulm Med [facility]

## 2025-01-29 ENCOUNTER — CARE COORDINATION (OUTPATIENT)
Dept: CARE COORDINATION | Age: 65
End: 2025-01-29

## 2025-01-29 NOTE — CARE COORDINATION
Care Transitions Note    Follow Up Call     Patient Current Location:  [place]    Care Transition Nurse contacted the patient by telephone. Verified name and  as identifiers.    Additional needs identified to be addressed with provider   No needs identified         Method of communication with provider: none.    Care Summary Note:     13:18 CTN LPN contacted the patient for Care Transition follow-up and spoke to Farhad Mylse briefly, he is at a doctor's office for his wife right now, he will call back later before 4 pm.      Future Appointments         Provider Specialty Dept Phone    2025 11:00 AM Dulce Maria Crespo MD Internal Medicine [phone]    2025 2:00 PM Joel Holcomb, APRN - CNP Physical Medicine and Rehab [phone]    3/4/2025 2:30 PM Rasheed Jade, APRN - CNP Pulmonology [phone]    3/27/2025 2:00 PM Marco George MD Cardiology [phone]    2025 3:00 PM Aaliyah Gonzales MD Rheumatology [phone]    4/10/2025 2:30 PM Cally Yang, APRN - CNP Urology [phone]    5/15/2025 2:40 PM Tiff Pierre, APRN - CNP Pulmonology [phone]            Care Transition Nurse provided contact information.  Plan for follow-up on next business day.  based on severity of symptoms and risk factors.  Plan for next call:   symptom management-dizziness, headache, cramps in LE, swelling LE, sob  self management-BP pO2% HD  follow-up appointment-PCP 25  Final call if stable      Linda Medina LPN

## 2025-01-30 ENCOUNTER — CARE COORDINATION (OUTPATIENT)
Dept: CARE COORDINATION | Age: 65
End: 2025-01-30

## 2025-01-30 NOTE — CARE COORDINATION
Care Transitions Note    Final Call     Attempted to reach patient for transitions of care follow up.  Unable to reach patient.      Outreach Attempts:   Multiple attempts to contact patient at phone numbers on file.   HIPAA compliant voicemail left for patient.   Distillt message sent.     Care Summary Note: called [phone], left vm, sent mcm.     Follow Up Appointment:   Future Appointments         Provider Specialty Dept Phone    2/4/2025 11:00 AM Dulce Maria Crespo MD Internal Medicine [phone]    2/18/2025 2:00 PM Joel Holcomb, APRN - CNP Physical Medicine and Rehab [phone]    3/4/2025 2:30 PM Rasheed Jade, APRN - CNP Pulmonology [phone]    3/27/2025 2:00 PM Marco George MD Cardiology [phone]    4/1/2025 3:00 PM Aaliyah Gonzales MD Rheumatology [phone]    4/10/2025 2:30 PM Cally Yang, APRN - CNP Urology [phone]    5/15/2025 2:40 PM Tiff Pierre, APRN - CNP Pulmonology [phone]          
Appointments         Provider Specialty Dept Phone    2/4/2025 11:00 AM Dulce Maria Crespo MD Internal Medicine [phone]    2/18/2025 2:00 PM Joel Holcomb, APRN - CNP Physical Medicine and Rehab [phone]    3/4/2025 2:30 PM Rasheed Jade, APRN - CNP Pulmonology [phone]    3/27/2025 2:00 PM Marco George MD Cardiology [phone]    4/1/2025 3:00 PM Aaliyah Gonzales MD Rheumatology [phone]    4/10/2025 2:30 PM Cally Yang, APRN - CNP Urology [phone]    5/15/2025 2:40 PM Tiff Pierre, APRN - [facility] Pulmonology [phone]            Patient has agreed to contact primary care provider and/or specialist for any further questions, concerns, or needs.    Linda Medina, AVINASHN

## 2025-02-01 ASSESSMENT — PATIENT HEALTH QUESTIONNAIRE - PHQ9
7. TROUBLE CONCENTRATING ON THINGS, SUCH AS READING THE NEWSPAPER OR WATCHING TELEVISION: SEVERAL DAYS
1. LITTLE INTEREST OR PLEASURE IN DOING THINGS: MORE THAN HALF THE DAYS
5. POOR APPETITE OR OVEREATING: SEVERAL DAYS
5. POOR APPETITE OR OVEREATING: SEVERAL DAYS
6. FEELING BAD ABOUT YOURSELF - OR THAT YOU ARE A FAILURE OR HAVE LET YOURSELF OR YOUR FAMILY DOWN: NEARLY EVERY DAY
2. FEELING DOWN, DEPRESSED OR HOPELESS: SEVERAL DAYS
4. FEELING TIRED OR HAVING LITTLE ENERGY: NEARLY EVERY DAY
1. LITTLE INTEREST OR PLEASURE IN DOING THINGS: MORE THAN HALF THE DAYS
9. THOUGHTS THAT YOU WOULD BE BETTER OFF DEAD, OR OF HURTING YOURSELF: NOT AT ALL
7. TROUBLE CONCENTRATING ON THINGS, SUCH AS READING THE NEWSPAPER OR WATCHING TELEVISION: SEVERAL DAYS
6. FEELING BAD ABOUT YOURSELF - OR THAT YOU ARE A FAILURE OR HAVE LET YOURSELF OR YOUR FAMILY DOWN: NEARLY EVERY DAY
10. IF YOU CHECKED OFF ANY PROBLEMS, HOW DIFFICULT HAVE THESE PROBLEMS MADE IT FOR YOU TO DO YOUR WORK, TAKE CARE OF THINGS AT HOME, OR GET ALONG WITH OTHER PEOPLE: SOMEWHAT DIFFICULT
SUM OF ALL RESPONSES TO PHQ QUESTIONS 1-9: 14
4. FEELING TIRED OR HAVING LITTLE ENERGY: NEARLY EVERY DAY
3. TROUBLE FALLING OR STAYING ASLEEP: NEARLY EVERY DAY
SUM OF ALL RESPONSES TO PHQ QUESTIONS 1-9: 14
9. THOUGHTS THAT YOU WOULD BE BETTER OFF DEAD, OR OF HURTING YOURSELF: NOT AT ALL
SUM OF ALL RESPONSES TO PHQ9 QUESTIONS 1 & 2: 3
10. IF YOU CHECKED OFF ANY PROBLEMS, HOW DIFFICULT HAVE THESE PROBLEMS MADE IT FOR YOU TO DO YOUR WORK, TAKE CARE OF THINGS AT HOME, OR GET ALONG WITH OTHER PEOPLE: SOMEWHAT DIFFICULT
2. FEELING DOWN, DEPRESSED OR HOPELESS: SEVERAL DAYS
8. MOVING OR SPEAKING SO SLOWLY THAT OTHER PEOPLE COULD HAVE NOTICED. OR THE OPPOSITE - BEING SO FIDGETY OR RESTLESS THAT YOU HAVE BEEN MOVING AROUND A LOT MORE THAN USUAL: NOT AT ALL
3. TROUBLE FALLING OR STAYING ASLEEP: NEARLY EVERY DAY
SUM OF ALL RESPONSES TO PHQ QUESTIONS 1-9: 14
SUM OF ALL RESPONSES TO PHQ QUESTIONS 1-9: 14
8. MOVING OR SPEAKING SO SLOWLY THAT OTHER PEOPLE COULD HAVE NOTICED. OR THE OPPOSITE, BEING SO FIGETY OR RESTLESS THAT YOU HAVE BEEN MOVING AROUND A LOT MORE THAN USUAL: NOT AT ALL
SUM OF ALL RESPONSES TO PHQ QUESTIONS 1-9: 14

## 2025-02-04 ENCOUNTER — OFFICE VISIT (OUTPATIENT)
Dept: INTERNAL MEDICINE CLINIC | Age: 65
End: 2025-02-04

## 2025-02-04 VITALS
SYSTOLIC BLOOD PRESSURE: 130 MMHG | TEMPERATURE: 98.3 F | HEART RATE: 64 BPM | HEIGHT: 72 IN | WEIGHT: 294.2 LBS | DIASTOLIC BLOOD PRESSURE: 68 MMHG | BODY MASS INDEX: 39.85 KG/M2

## 2025-02-04 DIAGNOSIS — E03.9 HYPOTHYROIDISM, UNSPECIFIED TYPE: ICD-10-CM

## 2025-02-04 DIAGNOSIS — K21.9 GASTROESOPHAGEAL REFLUX DISEASE WITHOUT ESOPHAGITIS: ICD-10-CM

## 2025-02-04 DIAGNOSIS — G25.2 ACTION TREMOR: ICD-10-CM

## 2025-02-04 DIAGNOSIS — E78.2 MIXED HYPERLIPIDEMIA: Primary | ICD-10-CM

## 2025-02-04 DIAGNOSIS — F32.9 MAJOR DEPRESSIVE DISORDER WITH CURRENT ACTIVE EPISODE, UNSPECIFIED DEPRESSION EPISODE SEVERITY, UNSPECIFIED WHETHER RECURRENT: ICD-10-CM

## 2025-02-04 DIAGNOSIS — I10 ESSENTIAL HYPERTENSION: ICD-10-CM

## 2025-02-04 DIAGNOSIS — G25.81 RLS (RESTLESS LEGS SYNDROME): ICD-10-CM

## 2025-02-04 RX ORDER — ATORVASTATIN CALCIUM 40 MG/1
40 TABLET, FILM COATED ORAL DAILY
Qty: 90 TABLET | Refills: 1 | Status: SHIPPED | OUTPATIENT
Start: 2025-02-04

## 2025-02-04 RX ORDER — TAMSULOSIN HYDROCHLORIDE 0.4 MG/1
0.4 CAPSULE ORAL 2 TIMES DAILY
COMMUNITY

## 2025-02-04 RX ORDER — LEVOTHYROXINE SODIUM 200 UG/1
200 TABLET ORAL DAILY
Qty: 90 TABLET | Refills: 1 | Status: SHIPPED | OUTPATIENT
Start: 2025-02-04

## 2025-02-04 RX ORDER — AMLODIPINE BESYLATE 10 MG/1
10 TABLET ORAL DAILY
Qty: 90 TABLET | Refills: 1 | Status: SHIPPED | OUTPATIENT
Start: 2025-02-04

## 2025-02-04 NOTE — PATIENT INSTRUCTIONS
Try to take levothyroxine 30 min before any other med or mild product.      Start docusate sodium (Colace) 100 mg twice a day.  Use Miralax as needed for constipation.  If still cycling with constipation/diarrhea - then start 1/2 cap Miralax daily.

## 2025-02-04 NOTE — PROGRESS NOTES
Problems in his brother and brother; Prostate Cancer in his brother; Stroke in his brother.    Social History:  Farhad  reports that he has never smoked. His smokeless tobacco use includes chew. He reports that he does not drink alcohol and does not use drugs.     Health Maintenance   Topic Date Due    HIV screen  Never done    Shingles vaccine (2 of 2) 10/05/2023    Hepatitis B vaccine (8 of 8 - Risk Dialysis Recombivax 3-dose series) 08/08/2024    Annual Wellness Visit (Medicare Advantage)  01/01/2025    Lipids  12/30/2025    Depression Monitoring  02/01/2026    DTaP/Tdap/Td vaccine (2 - Td or Tdap) 09/03/2031    Colorectal Cancer Screen  07/07/2033    Flu vaccine  Completed    Pneumococcal 50+ years Vaccine  Completed    COVID-19 Vaccine  Completed    Respiratory Syncytial Virus (RSV) Pregnant or age 60 yrs+  Completed    Hepatitis C screen  Completed    Hepatitis A vaccine  Aged Out    Hib vaccine  Aged Out    Polio vaccine  Aged Out    Meningococcal (ACWY) vaccine  Aged Out    A1C test (Diabetic or Prediabetic)  Discontinued    Diabetic Alb to Cr ratio (uACR) test  Discontinued    Depression Screen  Discontinued    Pneumococcal 0-49 years Vaccine  Discontinued    Diabetes screen  Discontinued    Prostate Specific Antigen (PSA) Screening or Monitoring  Discontinued       ROS:  Review of Systems   Constitutional:  Negative for chills, fever and unexpected weight change.   Respiratory:  Negative for cough, shortness of breath and wheezing.    Cardiovascular:  Positive for leg swelling. Negative for chest pain and palpitations.   Gastrointestinal:  Negative for abdominal distention, abdominal pain and blood in stool.   Endocrine: Negative for polydipsia, polyphagia and polyuria.   Genitourinary:  Negative for difficulty urinating, dysuria and hematuria.   Musculoskeletal:  Positive for arthralgias (Right shoulder pain - ortho appt coming up.). Negative for back pain, joint swelling and neck pain.        B/l knee

## 2025-02-15 ASSESSMENT — ENCOUNTER SYMPTOMS
BLOOD IN STOOL: 0
COUGH: 0

## 2025-03-03 RX ORDER — TAMSULOSIN HYDROCHLORIDE 0.4 MG/1
CAPSULE ORAL
Qty: 180 CAPSULE | Refills: 3 | Status: SHIPPED | OUTPATIENT
Start: 2025-03-03

## 2025-03-03 NOTE — TELEPHONE ENCOUNTER
Farhad Myles called requesting a refill on the following medications:  Requested Prescriptions     Pending Prescriptions Disp Refills    tamsulosin (FLOMAX) 0.4 MG capsule [Pharmacy Med Name: Tamsulosin HCl Oral Capsule 0.4 MG] 180 capsule 3     Sig: TAKE 1 CAPSULE BY MOUTH IN THE MORNING AND 1 CAPSULE IN THE EVENING.     Pharmacy verified:  .pv      Date of last visit: 10/10/2024  Date of next visit (if applicable): 4/10/2025

## 2025-03-04 ENCOUNTER — OFFICE VISIT (OUTPATIENT)
Dept: PULMONOLOGY | Age: 65
End: 2025-03-04
Payer: MEDICARE

## 2025-03-04 VITALS
SYSTOLIC BLOOD PRESSURE: 142 MMHG | HEIGHT: 73 IN | DIASTOLIC BLOOD PRESSURE: 70 MMHG | TEMPERATURE: 97.8 F | OXYGEN SATURATION: 95 % | HEART RATE: 79 BPM | WEIGHT: 311.2 LBS | BODY MASS INDEX: 41.24 KG/M2

## 2025-03-04 DIAGNOSIS — N18.6 ESRD (END STAGE RENAL DISEASE) ON DIALYSIS (HCC): ICD-10-CM

## 2025-03-04 DIAGNOSIS — R76.8 POSITIVE ANA (ANTINUCLEAR ANTIBODY): ICD-10-CM

## 2025-03-04 DIAGNOSIS — J98.4 RESTRICTIVE LUNG DISEASE: Primary | ICD-10-CM

## 2025-03-04 DIAGNOSIS — E66.01 MORBID OBESITY WITH BMI OF 40.0-44.9, ADULT: ICD-10-CM

## 2025-03-04 DIAGNOSIS — F32.9 MAJOR DEPRESSIVE DISORDER WITH CURRENT ACTIVE EPISODE, UNSPECIFIED DEPRESSION EPISODE SEVERITY, UNSPECIFIED WHETHER RECURRENT: ICD-10-CM

## 2025-03-04 DIAGNOSIS — Z99.2 ESRD (END STAGE RENAL DISEASE) ON DIALYSIS (HCC): ICD-10-CM

## 2025-03-04 DIAGNOSIS — R76.8 ABNORMAL ANCA (ANTINEUTROPHIL CYTOPLASMIC ANTIBODY): ICD-10-CM

## 2025-03-04 DIAGNOSIS — J96.11 CHRONIC RESPIRATORY FAILURE WITH HYPOXIA (HCC): ICD-10-CM

## 2025-03-04 DIAGNOSIS — I50.9 CHF (NYHA CLASS III, ACC/AHA STAGE C) (HCC): ICD-10-CM

## 2025-03-04 DIAGNOSIS — I10 ESSENTIAL HYPERTENSION: ICD-10-CM

## 2025-03-04 PROCEDURE — G8417 CALC BMI ABV UP PARAM F/U: HCPCS

## 2025-03-04 PROCEDURE — G8427 DOCREV CUR MEDS BY ELIG CLIN: HCPCS

## 2025-03-04 PROCEDURE — 3077F SYST BP >= 140 MM HG: CPT

## 2025-03-04 PROCEDURE — 3017F COLORECTAL CA SCREEN DOC REV: CPT

## 2025-03-04 PROCEDURE — 99214 OFFICE O/P EST MOD 30 MIN: CPT

## 2025-03-04 PROCEDURE — 4004F PT TOBACCO SCREEN RCVD TLK: CPT

## 2025-03-04 PROCEDURE — 3078F DIAST BP <80 MM HG: CPT

## 2025-03-04 ASSESSMENT — ENCOUNTER SYMPTOMS
SINUS PAIN: 0
SINUS PRESSURE: 0
RHINORRHEA: 0
CHEST TIGHTNESS: 0
WHEEZING: 0
SHORTNESS OF BREATH: 1
COUGH: 0

## 2025-03-04 NOTE — PROGRESS NOTES
[facility]    Patient: FARHAD DALLAS, 64 y.o.   : 1960  3/4/2025      Patient of Tiff Pierre CNP     Assessment/Plan   1. Restrictive lung disease    2. Chronic respiratory failure with hypoxia (HCC)    3. Positive OSEAS (antinuclear antibody)    4. Abnormal ANCA (antineutrophil cytoplasmic antibody)    5. Morbid obesity with BMI of 40.0-44.9, adult    6. ESRD (end stage renal disease) on dialysis (HCC)    7. CHF (NYHA class III, ACC/AHA stage C) (HCC)       -Restrictive lung disease likely secondary to other comorbidities including end-stage renal disease, CHF with frequent exacerbations and fluid volume overload.  -Patient hoping for kidney transplant but unfortunately not a candidate while he is on oxygen, per patient  -Attempted 6-minute walk with POC in office today and patient did not tolerate.  Having knee pain at this time and oxygen requirements were beyond that of a POC.  -Continue using oxygen as ordered at 4 L/min with exertion and 2 L/min at rest  -Continue dialysis as ordered by nephrology  -Follow-up with rheumatology as scheduled  -Continue diuresis per cardiology  -Continue to follow your cardiologist. Make sure to weigh yourself daily. If you gain 3 lbs in 1 day or 5 lbs in 1 week, call your cardiologist.    -Reviewed preventative vaccinations    Advised patient to call office with any changes, questions, or concerns regarding respiratory status or issues with prescribed medications    Return in about 6 months (around 2025) for RLD no testing with Tiff.     Subjective     Chief Complaint   Patient presents with    Follow-up     7 mo f/u for RLD with [id number], [id number]        HPI  Farhad is here for follow up for restrictive lung disease, decreased diffusion capacity, chronic respiratory failure with hypoxia, history of bilateral pleural effusion, abnormal CT of the chest with current glass opacities.     Autoimmune workup came back

## 2025-03-07 RX ORDER — AMLODIPINE BESYLATE 10 MG/1
10 TABLET ORAL DAILY
Qty: 90 TABLET | Refills: 1 | Status: SHIPPED | OUTPATIENT
Start: 2025-03-07

## 2025-03-07 RX ORDER — VENLAFAXINE HYDROCHLORIDE 150 MG/1
150 CAPSULE, EXTENDED RELEASE ORAL DAILY
Qty: 90 CAPSULE | Refills: 1 | Status: SHIPPED | OUTPATIENT
Start: 2025-03-07 | End: 2025-09-03

## 2025-03-12 DIAGNOSIS — M17.0 PRIMARY OSTEOARTHRITIS OF BOTH KNEES: ICD-10-CM

## 2025-03-12 DIAGNOSIS — M25.562 CHRONIC PAIN OF BOTH KNEES: ICD-10-CM

## 2025-03-12 DIAGNOSIS — M25.561 CHRONIC PAIN OF BOTH KNEES: ICD-10-CM

## 2025-03-12 DIAGNOSIS — G89.4 CHRONIC PAIN SYNDROME: ICD-10-CM

## 2025-03-12 DIAGNOSIS — G89.29 CHRONIC PAIN OF BOTH KNEES: ICD-10-CM

## 2025-03-12 DIAGNOSIS — M47.816 SPONDYLOSIS OF LUMBAR REGION WITHOUT MYELOPATHY OR RADICULOPATHY: ICD-10-CM

## 2025-03-12 DIAGNOSIS — G62.9 NEUROPATHY: ICD-10-CM

## 2025-03-12 DIAGNOSIS — M47.816 LUMBAR FACET ARTHROPATHY: ICD-10-CM

## 2025-03-12 DIAGNOSIS — M48.062 SPINAL STENOSIS OF LUMBAR REGION WITH NEUROGENIC CLAUDICATION: ICD-10-CM

## 2025-03-12 RX ORDER — OXYCODONE AND ACETAMINOPHEN 10; 325 MG/1; MG/1
1 TABLET ORAL EVERY 8 HOURS PRN
Qty: 90 TABLET | Refills: 0 | Status: SHIPPED | OUTPATIENT
Start: 2025-03-12 | End: 2025-04-11

## 2025-03-12 NOTE — TELEPHONE ENCOUNTER
Farhad needs to keep his follow up appointment with me next week as he has canceled a couple appointments in the last couple weeks.

## 2025-03-12 NOTE — TELEPHONE ENCOUNTER
OARRS reviewed. UDS: + for  .oxycodone, phenobarbital, gabapentin. Negative sertraline   Last seen: 12/18/2024. Follow-up:   Future Appointments   Date Time Provider Department Center   3/17/2025  2:00 PM Joel Holcomb APRN - CNP N SRPX Pain [facility]   3/27/2025  2:00 PM Marco George MD N SRPX Heart [facility]   4/1/2025  3:00 PM Aaliyah Gonzales MD SRPX RHEUM [facility]   4/10/2025  2:30 PM Cally Yang APRN - CNP N SRPX Del U [facility]   5/15/2025  2:40 PM Tiff Pierre APRN - CNP SRPX SLE [facility]   5/19/2025 11:30 AM Dulce Maria Crespo MD SRPX Physic [facility]   9/8/2025  3:00 PM Tiff Pierre APRN - CNP N Pulm Med [facility]

## 2025-03-17 ENCOUNTER — OFFICE VISIT (OUTPATIENT)
Dept: PHYSICAL MEDICINE AND REHAB | Age: 65
End: 2025-03-17
Payer: MEDICARE

## 2025-03-17 ENCOUNTER — TELEPHONE (OUTPATIENT)
Dept: PHYSICAL MEDICINE AND REHAB | Age: 65
End: 2025-03-17

## 2025-03-17 VITALS
BODY MASS INDEX: 41.26 KG/M2 | SYSTOLIC BLOOD PRESSURE: 138 MMHG | HEIGHT: 73 IN | DIASTOLIC BLOOD PRESSURE: 70 MMHG | WEIGHT: 311.29 LBS

## 2025-03-17 DIAGNOSIS — G62.9 NEUROPATHY: ICD-10-CM

## 2025-03-17 DIAGNOSIS — M47.816 SPONDYLOSIS OF LUMBAR REGION WITHOUT MYELOPATHY OR RADICULOPATHY: ICD-10-CM

## 2025-03-17 DIAGNOSIS — M48.062 SPINAL STENOSIS OF LUMBAR REGION WITH NEUROGENIC CLAUDICATION: ICD-10-CM

## 2025-03-17 DIAGNOSIS — I50.32 CHF (CONGESTIVE HEART FAILURE), NYHA CLASS II, CHRONIC, DIASTOLIC (HCC): ICD-10-CM

## 2025-03-17 DIAGNOSIS — M17.0 PRIMARY OSTEOARTHRITIS OF BOTH KNEES: Primary | ICD-10-CM

## 2025-03-17 DIAGNOSIS — G89.29 CHRONIC BILATERAL LOW BACK PAIN WITHOUT SCIATICA: ICD-10-CM

## 2025-03-17 DIAGNOSIS — N18.4 CHRONIC KIDNEY DISEASE, STAGE IV (SEVERE) (HCC): ICD-10-CM

## 2025-03-17 DIAGNOSIS — M25.562 CHRONIC PAIN OF BOTH KNEES: ICD-10-CM

## 2025-03-17 DIAGNOSIS — G89.29 CHRONIC PAIN OF BOTH KNEES: ICD-10-CM

## 2025-03-17 DIAGNOSIS — M47.816 LUMBAR FACET ARTHROPATHY: ICD-10-CM

## 2025-03-17 DIAGNOSIS — M25.561 CHRONIC PAIN OF BOTH KNEES: ICD-10-CM

## 2025-03-17 DIAGNOSIS — G89.4 CHRONIC PAIN SYNDROME: ICD-10-CM

## 2025-03-17 DIAGNOSIS — M54.50 CHRONIC BILATERAL LOW BACK PAIN WITHOUT SCIATICA: ICD-10-CM

## 2025-03-17 PROCEDURE — G8417 CALC BMI ABV UP PARAM F/U: HCPCS | Performed by: NURSE PRACTITIONER

## 2025-03-17 PROCEDURE — 3075F SYST BP GE 130 - 139MM HG: CPT | Performed by: NURSE PRACTITIONER

## 2025-03-17 PROCEDURE — 4004F PT TOBACCO SCREEN RCVD TLK: CPT | Performed by: NURSE PRACTITIONER

## 2025-03-17 PROCEDURE — 99214 OFFICE O/P EST MOD 30 MIN: CPT | Performed by: NURSE PRACTITIONER

## 2025-03-17 PROCEDURE — 3078F DIAST BP <80 MM HG: CPT | Performed by: NURSE PRACTITIONER

## 2025-03-17 PROCEDURE — G8427 DOCREV CUR MEDS BY ELIG CLIN: HCPCS | Performed by: NURSE PRACTITIONER

## 2025-03-17 PROCEDURE — 3017F COLORECTAL CA SCREEN DOC REV: CPT | Performed by: NURSE PRACTITIONER

## 2025-03-17 ASSESSMENT — ENCOUNTER SYMPTOMS
DIARRHEA: 0
COUGH: 1
CHEST TIGHTNESS: 0
CONSTIPATION: 0
GASTROINTESTINAL NEGATIVE: 1
STRIDOR: 0
BACK PAIN: 1
APNEA: 1
WHEEZING: 0
ABDOMINAL PAIN: 0
CHOKING: 0
SHORTNESS OF BREATH: 1

## 2025-03-17 NOTE — PROGRESS NOTES
SRPX [facility] PROFESSIONAL [facility] NEUROSCIENCE AND REH[facility]ILITATION CENTER  [address]  [place] [id number]  Dept: [phone]  Dept Fax: [phone]  Loc: [phone]    Visit Date: 3/17/2025    Functionality Assessment/Goals Worksheet     On a scale of 0 (Does not Interfere) to 10 (Completely Interferes)     1.  Which number describes how during the past week pain has interfered with       the following:  A.  General Activity:  10  B.  Mood: 9  C.  Walking Ability:  10  D.  Normal Work (Includes both work outside the home and housework):  10  E.  Relations with Other People:   9  F.  Sleep:   9  G.  Enjoyment of Life:   10    2.  Patient Prefers to Take their Pain Medications:     [x]  On a regular basis   [x]  Only when necessary    []  Does not take pain medications    3.  What are the Patient's Goals/Expectations for Visiting Pain Management?     [x]  Learn about my pain    [x]  Receive Medication   [x]  Physical Therapy     []  Treat Depression   [x]  Receive Injections    []  Treat Sleep   []  Deal with Anxiety and Stress   []  Treat Opoid Dependence/Addiction   []  Other:      HPI:   Farhad Myles is a 64 y.o. male is here today for    Chief Complaint: Low back pain, knee pain      HPI   Has been 3 months since last follow up. This patient canceled 3 appointments in the past month states the first one was because he had a fever and did not feel we would want him and the other 2 was canceled because he had dialysis appointments.   Farhad continues to have pain in his low back bilateral knees and his feet     Pain in his bilateral knees- constant aching, stiff pain with sharp and throbbing pains.   Pain in low back starts in the center and jonathan cross axially- aching pain with intermittent sharp pain.     Continues to have numbness and burning in his feet and sates he continues to have aching pain in his wrists and hands and shoulders. He is scheduled to see Rheumatology

## 2025-03-20 ENCOUNTER — TELEPHONE (OUTPATIENT)
Dept: CARDIOLOGY CLINIC | Age: 65
End: 2025-03-20

## 2025-03-20 NOTE — TELEPHONE ENCOUNTER
Pre op Risk Assessment    Procedure Bilateral L4-5 and L5-S1 Medial Branch Block  Physician Dr. Oscar Booth  Date of surgery/procedure TBD    Last OV 06/12/2024 with Lázaro   Last Stress 05/10/2024  Last Echo 12/31/2024  Last Cath 05/13/2024  Last Stent 05/13/2024  Is patient on blood thinners Aspirin and Plavix  Hold Meds/how many days Plavix for 7 days      Fax: [phone]  Phone: [phone]

## 2025-03-26 NOTE — PATIENT INSTRUCTIONS
Your Provider for Today: Dr. George  Your nurses for today: Henrietta    You may receive a survey regarding the care you received during your visit.  Your input is valuable to us.  We encourage you to complete and return your survey.  We hope you will choose us in the future for your healthcare needs.

## 2025-03-27 ENCOUNTER — OFFICE VISIT (OUTPATIENT)
Dept: CARDIOLOGY CLINIC | Age: 65
End: 2025-03-27
Payer: MEDICARE

## 2025-03-27 VITALS
SYSTOLIC BLOOD PRESSURE: 130 MMHG | WEIGHT: 307 LBS | DIASTOLIC BLOOD PRESSURE: 72 MMHG | HEART RATE: 68 BPM | BODY MASS INDEX: 41.58 KG/M2 | HEIGHT: 72 IN

## 2025-03-27 DIAGNOSIS — I25.83 CORONARY ARTERY DISEASE DUE TO LIPID RICH PLAQUE: Primary | ICD-10-CM

## 2025-03-27 DIAGNOSIS — I25.10 CORONARY ARTERY DISEASE DUE TO LIPID RICH PLAQUE: Primary | ICD-10-CM

## 2025-03-27 PROCEDURE — G8427 DOCREV CUR MEDS BY ELIG CLIN: HCPCS | Performed by: INTERNAL MEDICINE

## 2025-03-27 PROCEDURE — G8417 CALC BMI ABV UP PARAM F/U: HCPCS | Performed by: INTERNAL MEDICINE

## 2025-03-27 PROCEDURE — 99214 OFFICE O/P EST MOD 30 MIN: CPT | Performed by: INTERNAL MEDICINE

## 2025-03-27 PROCEDURE — 3017F COLORECTAL CA SCREEN DOC REV: CPT | Performed by: INTERNAL MEDICINE

## 2025-03-27 PROCEDURE — 4004F PT TOBACCO SCREEN RCVD TLK: CPT | Performed by: INTERNAL MEDICINE

## 2025-03-27 PROCEDURE — 3075F SYST BP GE 130 - 139MM HG: CPT | Performed by: INTERNAL MEDICINE

## 2025-03-27 PROCEDURE — 3078F DIAST BP <80 MM HG: CPT | Performed by: INTERNAL MEDICINE

## 2025-03-27 RX ORDER — CLOPIDOGREL BISULFATE 75 MG/1
75 TABLET ORAL DAILY
Qty: 90 TABLET | Refills: 3 | Status: SHIPPED | OUTPATIENT
Start: 2025-03-27

## 2025-03-27 NOTE — PROGRESS NOTES
SRPX [facility] PROFESSIONAL [facility] CARDIOLOGY  [address]  [address]  [place] [address]  Dept: [phone]  Dept Fax: [phone]  Loc: [phone]    Visit Date: 3/27/2025    Mr. Myles is a 64 y.o. male  who presented for:  Chief Complaint   Patient presents with    1 Year Follow Up    Coronary Artery Disease    Hypertension       HPI:   65 yo M  c hx of CAD,Restrictive lung disease,  HFpEF s/p cardiomems, HTN, DM, Obesity, LAURIE on BIPAP, ESRD on HD, Cardiomyopathy, colon resection 2/2017 is here for a follow up.    He is on 4L O2 supplementation.         Current Outpatient Medications:     clopidogrel (PLAVIX) 75 MG tablet, Take 1 tablet by mouth daily, Disp: 90 tablet, Rfl: 3    oxyCODONE-acetaminophen (PERCOCET)  MG per tablet, Take 1 tablet by mouth every 8 hours as needed for Pain for up to 30 days. Intended supply: 30 days Max Daily Amount: 3 tablets, Disp: 90 tablet, Rfl: 0    venlafaxine (EFFEXOR XR) 150 MG extended release capsule, Take 1 capsule by mouth daily, Disp: 90 capsule, Rfl: 1    amLODIPine (NORVASC) 10 MG tablet, Take 1 tablet by mouth daily, Disp: 90 tablet, Rfl: 1    tamsulosin (FLOMAX) 0.4 MG capsule, TAKE 1 CAPSULE BY MOUTH IN THE MORNING AND 1 CAPSULE IN THE EVENING., Disp: 180 capsule, Rfl: 3    atorvastatin (LIPITOR) 40 MG tablet, Take 1 tablet by mouth daily, Disp: 90 tablet, Rfl: 1    levothyroxine (SYNTHROID) 200 MCG tablet, Take 1 tablet by mouth daily, Disp: 90 tablet, Rfl: 1    pantoprazole (PROTONIX) 20 MG tablet, TAKE 1 TABLET EVERY MORNING BEFORE BREAKFAST, Disp: 90 tablet, Rfl: 3    gabapentin (NEURONTIN) 300 MG capsule, Take 1 capsule by mouth nightly for 60 days., Disp: 30 capsule, Rfl: 1    lidocaine 4 % external patch, Place 2 patches onto the skin daily, Disp: 60 patch, Rfl: 0    mirtazapine (REMERON) 7.5 MG tablet, TAKE 1 TABLET BY MOUTH EVERY DAY AT NIGHT, Disp: 90 tablet, Rfl: 1    melatonin 3 MG TABS tablet, TAKE 2 TABLETS EVERY

## 2025-04-02 ENCOUNTER — TELEPHONE (OUTPATIENT)
Dept: PHYSICAL MEDICINE AND REHAB | Age: 65
End: 2025-04-02

## 2025-04-02 NOTE — TELEPHONE ENCOUNTER
SOLO for the patient to call us back. We received his medical clearance back from Dr. George and I wanted to get him scheduled for his procedure with Dr. Aleman.

## 2025-04-10 ENCOUNTER — OFFICE VISIT (OUTPATIENT)
Dept: UROLOGY | Age: 65
End: 2025-04-10

## 2025-04-10 VITALS — HEIGHT: 72 IN | WEIGHT: 307 LBS | RESPIRATION RATE: 12 BRPM | BODY MASS INDEX: 41.58 KG/M2

## 2025-04-10 DIAGNOSIS — R97.20 ELEVATED PSA: ICD-10-CM

## 2025-04-10 DIAGNOSIS — N47.1 PHIMOSIS: ICD-10-CM

## 2025-04-10 DIAGNOSIS — Z99.2 ESRD (END STAGE RENAL DISEASE) ON DIALYSIS (HCC): ICD-10-CM

## 2025-04-10 DIAGNOSIS — N40.1 BENIGN PROSTATIC HYPERPLASIA WITH URINARY FREQUENCY: Primary | ICD-10-CM

## 2025-04-10 DIAGNOSIS — N18.4 CHRONIC KIDNEY DISEASE, STAGE IV (SEVERE) (HCC): ICD-10-CM

## 2025-04-10 DIAGNOSIS — I50.43 ACUTE ON CHRONIC COMBINED SYSTOLIC AND DIASTOLIC CONGESTIVE HEART FAILURE (HCC): ICD-10-CM

## 2025-04-10 DIAGNOSIS — R31.0 GROSS HEMATURIA: ICD-10-CM

## 2025-04-10 DIAGNOSIS — R35.0 BENIGN PROSTATIC HYPERPLASIA WITH URINARY FREQUENCY: Primary | ICD-10-CM

## 2025-04-10 DIAGNOSIS — R10.2 PELVIC PAIN IN MALE: ICD-10-CM

## 2025-04-10 DIAGNOSIS — N18.6 ESRD (END STAGE RENAL DISEASE) ON DIALYSIS (HCC): ICD-10-CM

## 2025-04-10 DIAGNOSIS — J96.11 CHRONIC RESPIRATORY FAILURE WITH HYPOXIA: ICD-10-CM

## 2025-04-10 LAB — POST VOID RESIDUAL (PVR): 0 ML

## 2025-04-10 NOTE — PROGRESS NOTES
includes chew. He reports that he does not drink alcohol and does not use drugs.      Subjective:      REVIEW OF SYSTEMS:  Constitutional: negative  Eyes: negative  Respiratory: negative  Cardiovascular: negative  Gastrointestinal: negative  Musculoskeletal: negative  Genitourinary: negative except for what is in HPI  Skin: negative   Neurological: negative  Hematological/Lymphatic: negative  Psychological: negative    Objective:   Resp 12   Ht 1.829 m (6' 0.01\")   Wt (!) 139.3 kg (307 lb)   BMI 41.63 kg/m²     Patient is a 64 y.o. male in no acute distress and alert and oriented to person, place and time.    Pulmonary: Non-labored respiration.  Cardiovascular: Normal rate and regular rhythm  Skin: Warm and dry.  Psych: Normal mood and affect.  Genitourinary: Bladder non-distended and non-tender.    POC  Results for orders placed or performed in visit on 04/10/25   poct post void residual   Result Value Ref Range    post void residual 0 ml         Patients recent PSA values are as follows  Lab Results   Component Value Date    PSA 0.99 10/08/2024    PSA 2.22 (H) 04/09/2024    PSA 1.24 (H) 04/07/2022        Recent BUN/Creatinine:  Lab Results   Component Value Date/Time    BUN 24 01/01/2025 06:24 AM    CREATININE 3.6 01/01/2025 06:24 AM       Radiology  No updated urologic imaging for review     Assessment/Plan:   1. Benign prostatic hyperplasia with urinary frequency  - LUTS stable, continue Flomax to 0.4mg BID.  - Avoid bph surgeries due to HD and low urine output- high chance of BNC.  - If symptoms worsen, may need to repeat cystoscopy   - poct post void residual    2. Elevated PSA  PSA stable, recheck annually.   - PSA Prostatic Specific Antigen; Future    3. Phimosis  Doing well after circumcision.     4. Gross hematuria  - No new occurrences   - Secondary to BPH  - Past cystoscopy negative.     5. Pelvic pain in male  - No infection present.   - Referral to pelvic floor therapy for relaxation techniques.

## 2025-04-11 DIAGNOSIS — N18.4 CHRONIC KIDNEY DISEASE, STAGE IV (SEVERE) (HCC): ICD-10-CM

## 2025-04-11 DIAGNOSIS — Z99.2 ESRD (END STAGE RENAL DISEASE) ON DIALYSIS (HCC): ICD-10-CM

## 2025-04-11 DIAGNOSIS — R10.2 PELVIC PAIN IN MALE: Primary | ICD-10-CM

## 2025-04-11 DIAGNOSIS — J96.11 CHRONIC RESPIRATORY FAILURE WITH HYPOXIA: Primary | ICD-10-CM

## 2025-04-11 DIAGNOSIS — R30.0 DYSURIA: ICD-10-CM

## 2025-04-11 DIAGNOSIS — N47.1 PHIMOSIS: ICD-10-CM

## 2025-04-11 DIAGNOSIS — R31.0 GROSS HEMATURIA: ICD-10-CM

## 2025-04-11 DIAGNOSIS — I50.43 ACUTE ON CHRONIC COMBINED SYSTOLIC AND DIASTOLIC CONGESTIVE HEART FAILURE (HCC): ICD-10-CM

## 2025-04-11 DIAGNOSIS — N18.6 ESRD (END STAGE RENAL DISEASE) ON DIALYSIS (HCC): ICD-10-CM

## 2025-04-15 DIAGNOSIS — Z99.2 ESRD (END STAGE RENAL DISEASE) ON DIALYSIS (HCC): ICD-10-CM

## 2025-04-15 DIAGNOSIS — R97.20 ELEVATED PSA: ICD-10-CM

## 2025-04-15 DIAGNOSIS — N18.4 CHRONIC KIDNEY DISEASE, STAGE IV (SEVERE) (HCC): ICD-10-CM

## 2025-04-15 DIAGNOSIS — R35.0 BENIGN PROSTATIC HYPERPLASIA WITH URINARY FREQUENCY: Primary | ICD-10-CM

## 2025-04-15 DIAGNOSIS — N40.1 BENIGN PROSTATIC HYPERPLASIA WITH URINARY FREQUENCY: Primary | ICD-10-CM

## 2025-04-15 DIAGNOSIS — N18.6 ESRD (END STAGE RENAL DISEASE) ON DIALYSIS (HCC): ICD-10-CM

## 2025-04-22 ENCOUNTER — TELEPHONE (OUTPATIENT)
Dept: PHYSICAL MEDICINE AND REHAB | Age: 65
End: 2025-04-22

## 2025-04-22 NOTE — TELEPHONE ENCOUNTER
No. It appears from that denial reason there is no way his insurance will cover this procedure. Please schedule follow up for 2 months from last visit for medications. Can discuss other possible options at follow up.

## 2025-04-22 NOTE — TELEPHONE ENCOUNTER
05/08/25 left knee genicular nerve block - Requesting Additional Information    Reason:        Would you like to set up a Peer to Peer?

## 2025-04-22 NOTE — TELEPHONE ENCOUNTER
INSURANCE COVERAGE REGARDING PAYMENT  FOR YOUR COLONOSCOPY        Colon Cancer is the second leading cause of death among cancers, per the American Cancer Society. It is preventable. Early detection is the key. Your doctor will determine which tests need to be done for prevention and/or treatment. However, colonoscopies can be performed for several reasons:     Screening To screen for any problems within the colon. In this case, the patient is not symptomatic and does not have a personal history of previous colon cancer/condition or abnormal findings. Billed as screening.   Surveillance To monitor for a previously diagnosed colon condition (such as polyps) or when performed at more frequent intervals than every ten years because the patient has a personal/family history of colonic polyps or colon cancer. Billed as diagnostic.   Diagnostic Patient with symptoms, used to evaluate the colon. Billed as diagnostic.       If during a screening colonoscopy, our physician finds an abnormality, performs a biopsy or polypectomy (removal of polyp), your insurance company may consider the procedure to be a diagnostic exam and no longer a screening procedure.     Every insurance company is different. We encourage you to call your insurance company regarding plan benefits. Generally, screening benefits and diagnostic benefits may be paid at different levels. Charges associated with anesthesia or type of facility may also be processed differently. This varies with each insurance company, so we want you to be aware of this prior to your procedure. You do not have to call your insurance company if you have Medicare. For an estimate of potential charges, you may call the [facility] Patient Contact Center at [phone], option 5.     The authorization staff at [facility] will contact your insurance company to check precertification requirements for your colonoscopy. However, precertification, which serves as notification  I was not able to reschedule the Peer to Peer so it is still scheduled for tomorrow at 9:45   is never a guarantee of payment. If you have questions regarding precertification for your procedure, please contact your insurance company. If you need further assistance call our authorization department at [phone].

## 2025-04-23 NOTE — TELEPHONE ENCOUNTER
I did the peer to peer review. Physician states as of December 10 th  of 2024 [employer] does not cover genicular nerve blocks or genicular nerve RFA for any indication as letter stated. So this just wont be approved.

## 2025-04-24 ENCOUNTER — HOSPITAL ENCOUNTER (OUTPATIENT)
Dept: PHYSICAL THERAPY | Age: 65
Setting detail: THERAPIES SERIES
Discharge: HOME | End: 2025-04-24
Payer: MEDICARE

## 2025-04-24 ENCOUNTER — TELEPHONE (OUTPATIENT)
Dept: PHYSICAL MEDICINE AND REHAB | Age: 65
End: 2025-04-24

## 2025-04-24 DIAGNOSIS — G89.29 CHRONIC PAIN OF BOTH KNEES: ICD-10-CM

## 2025-04-24 DIAGNOSIS — G89.4 CHRONIC PAIN SYNDROME: ICD-10-CM

## 2025-04-24 DIAGNOSIS — M25.562 CHRONIC PAIN OF BOTH KNEES: ICD-10-CM

## 2025-04-24 DIAGNOSIS — M47.816 LUMBAR FACET ARTHROPATHY: ICD-10-CM

## 2025-04-24 DIAGNOSIS — M25.561 CHRONIC PAIN OF BOTH KNEES: ICD-10-CM

## 2025-04-24 DIAGNOSIS — M17.0 PRIMARY OSTEOARTHRITIS OF BOTH KNEES: ICD-10-CM

## 2025-04-24 DIAGNOSIS — M48.062 SPINAL STENOSIS OF LUMBAR REGION WITH NEUROGENIC CLAUDICATION: ICD-10-CM

## 2025-04-24 DIAGNOSIS — M47.816 SPONDYLOSIS OF LUMBAR REGION WITHOUT MYELOPATHY OR RADICULOPATHY: ICD-10-CM

## 2025-04-24 DIAGNOSIS — G62.9 NEUROPATHY: ICD-10-CM

## 2025-04-24 PROCEDURE — 97110 THERAPEUTIC EXERCISES: CPT

## 2025-04-24 PROCEDURE — 97530 THERAPEUTIC ACTIVITIES: CPT

## 2025-04-24 PROCEDURE — 97166 OT EVAL MOD COMPLEX 45 MIN: CPT

## 2025-04-24 RX ORDER — OXYCODONE AND ACETAMINOPHEN 10; 325 MG/1; MG/1
1 TABLET ORAL EVERY 8 HOURS PRN
Qty: 90 TABLET | Refills: 0 | Status: SHIPPED | OUTPATIENT
Start: 2025-04-24 | End: 2025-05-28 | Stop reason: SDUPTHER

## 2025-04-24 NOTE — TELEPHONE ENCOUNTER
OARRS reviewed. UDS: + for  Gabapentin, phenobarbital,oxycodone. Negative sertraline.   Last seen: 3/17/2025. Follow-up:   Future Appointments   Date Time Provider Department Center   5/1/2025  6:30 AM STR SPECIAL PROCEDURE ROOM 1 [facility] SPEC STR Rad/Card   5/5/2025  2:40 PM Joel Holcomb APRN - CNP N SRPX Pain [facility] [place]   5/6/2025  2:00 PM Doug Moreno APRN - CNP N SRPX PALLI [facility] [place]   5/15/2025  2:40 PM Tiff Pierre APRN - CNP SRPX SLE [facility] [place]   5/19/2025 11:30 AM Dulce Maria Crespo MD SRPX Physic [facility]   5/28/2025 10:00 AM Ashlie Jenkins, OT STRZ PH[facility] [place] [facility]   7/1/2025  8:00 AM GregoryAshlie bello, OT STRZ [facility] [place] [facility]   7/7/2025  2:00 PM GregoryShelley bellosa, OT STR[facility] [place] [facility]   7/14/2025  9:00 AM GregoryAshlie bello, OT STRZ [facility] [place] [facility]   7/16/2025  2:00 PM Aaliyah Gonzales MD SR[facility] RHEUM [facility]   9/8/2025  3:00 PM Tiff Pierre APRN - CNP N Pulm Med [facility] [place]   10/9/2025  2:30 PM Cally Yang APRN - CNP N SRPX Del U [facility]   12/4/2025  2:45 PM Marco George MD N SRP[facility] Heart [facility]

## 2025-04-24 NOTE — THERAPY EVALUATION
[facility]  OCCUPATIONAL THERAPY  OUTPATIENT REHABILITATION - SPECIALIZED THERAPY SERVICES  [x] PELVIC HEALTH EVALUATION  [] DAILY NOTE  [] PROGRESS NOTE [] DISCHARGE NOTE    Date: 2025  Patient Name:  Farhad Myles  : 1960  MRN: [id number]  CSN: [id number]    Referring Practitioner Cally Yang, APRN - CNP [phone]      Diagnosis  Diagnoses       R10.2 (ICD-10-CM) - Pelvic pain in male    R31.0 (ICD-10-CM) - Gross hematuria    R30.0 (ICD-10-CM) - Dysuria           Treatment Diagnosis R10.2 - Pelvic and Perineal Pain  M62.81 - Muscle Weakness (Generalized)  N39.41 - Urge Incontinence  K59.00 - Constipation, Unspecified  R27.8 - Other Lack of Coordination   Date of Evaluation 25   Additional Pertinent History Farhad Myles has a past medical history of Admission for dialysis, Arthritis, Back problem, Bladder disease, CHF with unknown LVEF (HCC), Chronic kidney disease, Constipation, Coronary artery disease involving native coronary artery of native heart with angina pectoris, History of diabetes mellitus, Hypertension, Hypertensive emergency, Hypertensive urgency, Sleep apnea, and Thyroid disease.  he has a past surgical history that includes Appendectomy; knee surgery (Right, ); Carpal tunnel release (Bilateral); Colonoscopy (); hernia repair; pr exploratory laparotomy celiotomy w/wo biopsy spx (N/A, 2018); Dilatation, esophagus; Abdomen surgery; Cardiac catheterization; Abdomen surgery (N/A, 3/25/2022); Dialysis fistula creation (Left, 2022); Circumcision (N/A, 3/2/2023); Cystoscopy (N/A, 3/2/2023); Colonoscopy (N/A, 2023); Upper gastrointestinal endoscopy (N/A, 2023); Cardiac procedure (Bilateral, 2024); and Cardiac procedure (N/A, 2024).     Allergies Allergies   Allergen Reactions    Azithromycin Itching     Hands swell and blister      Shellfish-Derived Products Swelling     Tolerates IV dye without any problems

## 2025-04-24 NOTE — TELEPHONE ENCOUNTER
Patient is requesting a refill on his percocet. Pharmacy is [facility] in [facility]

## 2025-04-26 DIAGNOSIS — G25.81 RLS (RESTLESS LEGS SYNDROME): ICD-10-CM

## 2025-04-29 RX ORDER — ROPINIROLE 0.5 MG/1
TABLET, FILM COATED ORAL
Qty: 180 TABLET | Refills: 3 | Status: SHIPPED | OUTPATIENT
Start: 2025-04-29

## 2025-05-01 ENCOUNTER — HOSPITAL ENCOUNTER (OUTPATIENT)
Dept: INTERVENTIONAL RADIOLOGY/VASCULAR | Age: 65
Discharge: HOME | End: 2025-05-01
Payer: MEDICARE

## 2025-05-01 VITALS
HEIGHT: 73 IN | SYSTOLIC BLOOD PRESSURE: 187 MMHG | OXYGEN SATURATION: 100 % | DIASTOLIC BLOOD PRESSURE: 86 MMHG | TEMPERATURE: 97.9 F | WEIGHT: 304.24 LBS | BODY MASS INDEX: 40.32 KG/M2 | RESPIRATION RATE: 20 BRPM | HEART RATE: 74 BPM

## 2025-05-01 DIAGNOSIS — N18.6 ESRD (END STAGE RENAL DISEASE) (HCC): ICD-10-CM

## 2025-05-01 PROCEDURE — 6370000000 HC RX 637 (ALT 250 FOR IP): Performed by: RADIOLOGY

## 2025-05-01 PROCEDURE — 36902 INTRO CATH DIALYSIS CIRCUIT: CPT

## 2025-05-01 PROCEDURE — 2580000003 HC RX 258: Performed by: RADIOLOGY

## 2025-05-01 PROCEDURE — 6360000004 HC RX CONTRAST MEDICATION: Performed by: RADIOLOGY

## 2025-05-01 PROCEDURE — 6360000002 HC RX W HCPCS: Performed by: RADIOLOGY

## 2025-05-01 PROCEDURE — 99211 OFF/OP EST MAY X REQ PHY/QHP: CPT

## 2025-05-01 PROCEDURE — 2709999900 IR ANGIOGRAM ARTERIOVENOUS SHUNT

## 2025-05-01 RX ORDER — MIDAZOLAM HYDROCHLORIDE 1 MG/ML
INJECTION, SOLUTION INTRAMUSCULAR; INTRAVENOUS PRN
Status: COMPLETED | OUTPATIENT
Start: 2025-05-01 | End: 2025-05-01

## 2025-05-01 RX ORDER — MIDAZOLAM HYDROCHLORIDE 1 MG/ML
1 INJECTION, SOLUTION INTRAMUSCULAR; INTRAVENOUS ONCE
Status: DISCONTINUED | OUTPATIENT
Start: 2025-05-01 | End: 2025-05-02 | Stop reason: HOSPADM

## 2025-05-01 RX ORDER — OXYCODONE AND ACETAMINOPHEN 10; 325 MG/1; MG/1
1 TABLET ORAL ONCE
Refills: 0 | Status: COMPLETED | OUTPATIENT
Start: 2025-05-01 | End: 2025-05-01

## 2025-05-01 RX ORDER — LIDOCAINE HYDROCHLORIDE 20 MG/ML
INJECTION, SOLUTION EPIDURAL; INFILTRATION; INTRACAUDAL; PERINEURAL PRN
Status: COMPLETED | OUTPATIENT
Start: 2025-05-01 | End: 2025-05-01

## 2025-05-01 RX ORDER — LIDOCAINE 40 MG/G
CREAM TOPICAL ONCE
Status: COMPLETED | OUTPATIENT
Start: 2025-05-01 | End: 2025-05-01

## 2025-05-01 RX ORDER — HEPARIN SODIUM 1000 [USP'U]/ML
INJECTION, SOLUTION INTRAVENOUS; SUBCUTANEOUS PRN
Status: COMPLETED | OUTPATIENT
Start: 2025-05-01 | End: 2025-05-01

## 2025-05-01 RX ORDER — IOPAMIDOL 510 MG/ML
INJECTION, SOLUTION INTRAVASCULAR PRN
Status: COMPLETED | OUTPATIENT
Start: 2025-05-01 | End: 2025-05-01

## 2025-05-01 RX ORDER — SODIUM CHLORIDE 450 MG/100ML
INJECTION, SOLUTION INTRAVENOUS CONTINUOUS
Status: DISCONTINUED | OUTPATIENT
Start: 2025-05-01 | End: 2025-05-02 | Stop reason: HOSPADM

## 2025-05-01 RX ADMIN — HYDROMORPHONE HYDROCHLORIDE 0.25 MG: 1 INJECTION, SOLUTION INTRAMUSCULAR; INTRAVENOUS; SUBCUTANEOUS at 08:26

## 2025-05-01 RX ADMIN — LIDOCAINE HYDROCHLORIDE 5 ML: 20 INJECTION, SOLUTION EPIDURAL; INFILTRATION; INTRACAUDAL; PERINEURAL at 08:37

## 2025-05-01 RX ADMIN — LIDOCAINE 4%: 4 CREAM TOPICAL at 07:23

## 2025-05-01 RX ADMIN — MIDAZOLAM 1 MG: 1 INJECTION INTRAMUSCULAR; INTRAVENOUS at 08:26

## 2025-05-01 RX ADMIN — SODIUM CHLORIDE: 0.45 INJECTION, SOLUTION INTRAVENOUS at 07:23

## 2025-05-01 RX ADMIN — HEPARIN SODIUM 4000 UNITS: 1000 INJECTION INTRAVENOUS; SUBCUTANEOUS at 08:43

## 2025-05-01 RX ADMIN — MIDAZOLAM 1 MG: 1 INJECTION INTRAMUSCULAR; INTRAVENOUS at 08:37

## 2025-05-01 RX ADMIN — HYDROMORPHONE HYDROCHLORIDE 0.5 MG: 1 INJECTION, SOLUTION INTRAMUSCULAR; INTRAVENOUS; SUBCUTANEOUS at 08:19

## 2025-05-01 RX ADMIN — CEFAZOLIN 3000 MG: 10 INJECTION, POWDER, FOR SOLUTION INTRAVENOUS at 07:23

## 2025-05-01 RX ADMIN — HYDROMORPHONE HYDROCHLORIDE 0.5 MG: 1 INJECTION, SOLUTION INTRAMUSCULAR; INTRAVENOUS; SUBCUTANEOUS at 08:45

## 2025-05-01 RX ADMIN — OXYCODONE AND ACETAMINOPHEN 1 TABLET: 10; 325 TABLET ORAL at 09:38

## 2025-05-01 RX ADMIN — MIDAZOLAM 1 MG: 1 INJECTION INTRAMUSCULAR; INTRAVENOUS at 08:19

## 2025-05-01 RX ADMIN — HYDROMORPHONE HYDROCHLORIDE 0.25 MG: 1 INJECTION, SOLUTION INTRAMUSCULAR; INTRAVENOUS; SUBCUTANEOUS at 08:37

## 2025-05-01 RX ADMIN — IOPAMIDOL 40 ML: 510 INJECTION, SOLUTION INTRAVASCULAR at 09:10

## 2025-05-01 ASSESSMENT — PAIN SCALES - GENERAL
PAINLEVEL_OUTOF10: 7
PAINLEVEL_OUTOF10: 9
PAINLEVEL_OUTOF10: 6

## 2025-05-01 ASSESSMENT — PAIN DESCRIPTION - LOCATION
LOCATION: BACK;LEG;KNEE
LOCATION: BACK;ARM

## 2025-05-01 ASSESSMENT — PAIN DESCRIPTION - ORIENTATION: ORIENTATION: RIGHT;LEFT;LOWER

## 2025-05-01 NOTE — PROGRESS NOTES
Pharmacy Pre-Op Antibiotic Dose Adjustment    Farhad Myles is a 64 y.o. male scheduled for surgery. Pharmacy will adjust the following pre-op antibiotic per P&T approved policy: cefazolin    Weight:  Wt Readings from Last 1 Encounters:   04/10/25 (!) 139.3 kg (307 lb)     There is no height or weight on file to calculate BMI.    Plan:   Pre-op antibiotic adjustment: increase cefazolin from 2 g to 3 g  CANDACE CAN, PHARM. D  5/1/2025  6:51 AM

## 2025-05-01 NOTE — OP NOTE
Department of Radiology  Post Procedure Progress Note      Pre-Procedure Diagnosis: Malfunctioning dialysis fistula/graft     Procedure Performed:  Dialysis fistulagram with angioplasty     Anesthesia: local / versed and dilaudid    Findings: successful    Immediate Complications:  None    Estimated Blood Loss: minimal    SEE DICTATED PROCEDURE NOTE FOR COMPLETE DETAILS.    Electronically signed by Sabas Guy MD on 5/1/2025 at 8:58 AM

## 2025-05-01 NOTE — PROGRESS NOTES
0640 pt arrives via wheelchair for fistulagram with spouse. Procedure explained and questions answered. PT RIGHTS AND RESPONSIBILITIES OFFERED TO PT. Pt denies taking blood thinners for past 5 days. Pt has been NPO since 4/30/25 except for small sip of water with AM pills.   0730 pt to specials via bed.  0915 pt back from [facility]. Pt having 9/10 pain. Dr. Guy messaged. New order received. Slipknot dressing on left arm clean,dry and intact. No bleeding drainage redness or swelling noted. Spouse at bedside.   0930 pt provided with ice water. Spouse at bedside. Site unchanged.   0938 pain med given as per MAR.   0945 vitals stable. Site unchanged. Pt provided with muffin and water.   1005 slip knot removed. Firm pressure held for 5 min. No bleeding noted. Pressure dressing applied. Pt tolerated it well with no complaints. Pt using urinal.   1015 vitals stable. Dressing clean, dry and intact.   1045 pt discharged via wheelchair with spouse with instructions with no complaints. Dressing on left arm clean, dry and intact. No bleeding drainage redness or swelling noted.       __m__ Safety:       (Environmental)  [place] to environment  Ensure ID band is correct and in place/ allergy band as needed  Assess for fall risk  Initiate fall precautions as applicable (fall band, side rails, etc.)  Call light within reach  Bed in low position/ wheels locked    __m__ Pain:       Assess pain level and characteristics  Administer analgesics as ordered  Assess effectiveness of pain management and report to MD as needed    _m___ Knowledge Deficit:  Assess baseline knowledge  Provide teaching at level of understanding  Provide teaching via preferred learning method  Evaluate teaching effectiveness    _m___ Hemodynamic/Respiratory Status:       (Pre and Post Procedure Monitoring)  Assess/Monitor vital signs and LOC  Assess Baseline SpO2 prior to any sedation  Obtain weight/height  Assess vital signs/ LOC until patient meets

## 2025-05-01 NOTE — H&P
[facility]  Sedation/Analgesia History & Physical    Pt Name: Farhad Myles  MRN: [id number]  YOB: 1960  Provider Performing Procedure: Sabas Guy MD, MD  Primary Care Physician: Dulce Maria Crespo MD    Formulation and discussion of sedation / procedure plans, risks, benefits, side effects and alternatives with patient and/or responsible adult completed.    PRE-PROCEDURE   DNR-CCA/DNR-CC []Yes [x]No  Brief History/Pre-Procedure Diagnosis: Renal failure, malfunctioning AVF          MEDICAL HISTORY  []CAD/Valve  []Liver Disease  []Lung Disease []Diabetes  []Hypertension []Renal Disease  [x]Additional information:       has a past medical history of Admission for dialysis, Arthritis, Back problem, Bladder disease, CHF with unknown LVEF (HCC), Chronic kidney disease, Constipation, Coronary artery disease involving native coronary artery of native heart with angina pectoris, History of diabetes mellitus, Hypertension, Hypertensive emergency, Hypertensive urgency, Sleep apnea, and Thyroid disease.    SURGICAL HISTORY   has a past surgical history that includes Appendectomy; knee surgery (Right, 1980's); Carpal tunnel release (Bilateral); Colonoscopy (2017); hernia repair; pr exploratory laparotomy celiotomy w/wo biopsy spx (N/A, 2/5/2018); Dilatation, esophagus; Abdomen surgery; Cardiac catheterization; Abdomen surgery (N/A, 3/25/2022); Dialysis fistula creation (Left, 12/27/2022); Circumcision (N/A, 3/2/2023); Cystoscopy (N/A, 3/2/2023); Colonoscopy (N/A, 7/7/2023); Upper gastrointestinal endoscopy (N/A, 7/7/2023); Cardiac procedure (Bilateral, 5/13/2024); and Cardiac procedure (N/A, 5/13/2024).  Additional information:       ALLERGIES   Allergies as of 05/01/2025 - Fully Reviewed 05/01/2025   Allergen Reaction Noted    Azithromycin Itching 10/26/2022    Shellfish-derived products Swelling 04/23/2013    Pcn [penicillins] Itching 04/23/2013    Succinylcholine  02/05/2018    Sulfa

## 2025-05-01 NOTE — PROGRESS NOTES
TRANSFER - OUT REPORT:    Verbal report given to Stephanie RN(name) on Farhad Myles being transferred to [facility](unit) for routine progression of patient care       Report consisted of patient's Situation, Background, Assessment and   Recommendations(SBAR).     Information from the following report(s) Nurse Handoff Report, Surgery Report, and MAR was reviewed with the receiving nurse.    Opportunity for questions and clarification was provided.      Patient transported with:   Monitor  O2 @ 4 liters

## 2025-05-01 NOTE — PROGRESS NOTES
0735 Patient received in IR for fistulogram.   0742 This procedure has been fully reviewed with the patient and written informed consent has been obtained.  0819 1 mg versed, 0.5mg dilaudid  0826 1 mg versed, 0.25mg dilaudid  0836 Procedure started with Dr. Guy.  0837 Access to the left arm fistula via sonosite.  1mg versed, 0.25mg dilaudid.  0843 4000 units of heparin  0844 Angioplasty of the left run off vein with [place] 35 10 x 60 balloon.  0845 0.5mg diluadid  0847 Angioplasty of the left run off vein with [place] 35 10 x 60 balloon.  0856 Procedure completed; patient tolerated well. Slip knot in place x1. Dressing to left upper arm, gauze and opsite; no bleeding noted.  0900 Unable to locate family in waiting rooms  0905 Patient on cart; comfort ensured.  0908 Patient taken to OPN via cart/transport/nurse. Pt alert and oriented x3; follows commands. Skin pink, warm, and dry. Respirations easy, regular, and nonlabored.

## 2025-05-01 NOTE — DISCHARGE INSTRUCTIONS
KEEP DRESSING CLEAN AND DRY UNTIL DIALYSIS APPOINTMENT.    IF SEVERE BLEEDING, PAIN OR SWELLING OCCURS, GO TO EMERGENCY ROOM.             SEDATION/ANALGESIA INFORMATION HOME GOING ADVICE    Review the following information with the patient prior to the procedure.  Sedation/agalgesia is used during short medical procedures under controlled supervision.  The medication will produce a strong relaxation.  You will be able to hear, speak and follow instructions, but you memory and alertness will be decreased.  You will be able to swallow and breathe on your own.  During sedation/analgesia you blood pressure, hear and breathing will be watched closely.  After the procedure, you may not remember what was said or done.    Procedure: fistulagram     Date: 5/1/25  You may have the following effects from the medication.  Drowsiness, dizziness, sleepiness or confusion.  Difficulty remembering or delayed reaction times.  Loss of fine muscle control or difficulty with your balance especially while walking.  Difficulty focusing or blurred vision.  You may not be aware of slight changes in your behavior and/or your reaction time because of the medication used during the procedure.  Therefore you should follow these instructions.  Have someone responsible help you with your care.  Do not drive for 24 hours.  Do not operate equipment for 24 hours (lawnmowers, power tools, kitchen accessories, stove).  Do not drink any alcoholic beverages for a minimum of 24 hours.  Do not make important personal, legal or business decisions for 24 hours.  You may experience dizziness or lightheadedness.  Move slowly and carefully, do not make sudden position changes.  Drink extra amounts of fluids today.  Increase your diet as tolerated (unless you have received specific instructions from your doctor).  If you feel nauseated, continue with liquids until nausea is gone  Notify your physician if you have not urinated within 8 hours after the

## 2025-05-13 ENCOUNTER — TELEPHONE (OUTPATIENT)
Dept: PHYSICAL MEDICINE AND REHAB | Age: 65
End: 2025-05-13

## 2025-05-13 ENCOUNTER — OFFICE VISIT (OUTPATIENT)
Dept: PHYSICAL MEDICINE AND REHAB | Age: 65
End: 2025-05-13
Payer: MEDICARE

## 2025-05-13 VITALS
WEIGHT: 304.24 LBS | HEIGHT: 73 IN | SYSTOLIC BLOOD PRESSURE: 142 MMHG | BODY MASS INDEX: 40.32 KG/M2 | DIASTOLIC BLOOD PRESSURE: 80 MMHG

## 2025-05-13 DIAGNOSIS — M25.562 CHRONIC PAIN OF BOTH KNEES: ICD-10-CM

## 2025-05-13 DIAGNOSIS — G62.9 NEUROPATHY: ICD-10-CM

## 2025-05-13 DIAGNOSIS — N18.4 CHRONIC KIDNEY DISEASE, STAGE IV (SEVERE) (HCC): ICD-10-CM

## 2025-05-13 DIAGNOSIS — M17.0 PRIMARY OSTEOARTHRITIS OF BOTH KNEES: Primary | ICD-10-CM

## 2025-05-13 DIAGNOSIS — M25.561 CHRONIC PAIN OF BOTH KNEES: ICD-10-CM

## 2025-05-13 DIAGNOSIS — I50.32 CHF (CONGESTIVE HEART FAILURE), NYHA CLASS II, CHRONIC, DIASTOLIC (HCC): ICD-10-CM

## 2025-05-13 DIAGNOSIS — G89.4 CHRONIC PAIN SYNDROME: ICD-10-CM

## 2025-05-13 DIAGNOSIS — M47.816 LUMBAR FACET ARTHROPATHY: ICD-10-CM

## 2025-05-13 DIAGNOSIS — M47.816 SPONDYLOSIS OF LUMBAR REGION WITHOUT MYELOPATHY OR RADICULOPATHY: ICD-10-CM

## 2025-05-13 DIAGNOSIS — M48.062 SPINAL STENOSIS OF LUMBAR REGION WITH NEUROGENIC CLAUDICATION: ICD-10-CM

## 2025-05-13 DIAGNOSIS — G89.29 CHRONIC PAIN OF BOTH KNEES: ICD-10-CM

## 2025-05-13 PROCEDURE — G8427 DOCREV CUR MEDS BY ELIG CLIN: HCPCS | Performed by: NURSE PRACTITIONER

## 2025-05-13 PROCEDURE — 99214 OFFICE O/P EST MOD 30 MIN: CPT | Performed by: NURSE PRACTITIONER

## 2025-05-13 PROCEDURE — 3079F DIAST BP 80-89 MM HG: CPT | Performed by: NURSE PRACTITIONER

## 2025-05-13 PROCEDURE — G8417 CALC BMI ABV UP PARAM F/U: HCPCS | Performed by: NURSE PRACTITIONER

## 2025-05-13 PROCEDURE — 3077F SYST BP >= 140 MM HG: CPT | Performed by: NURSE PRACTITIONER

## 2025-05-13 PROCEDURE — 3017F COLORECTAL CA SCREEN DOC REV: CPT | Performed by: NURSE PRACTITIONER

## 2025-05-13 PROCEDURE — 4004F PT TOBACCO SCREEN RCVD TLK: CPT | Performed by: NURSE PRACTITIONER

## 2025-05-13 ASSESSMENT — ENCOUNTER SYMPTOMS
WHEEZING: 0
APNEA: 1
ABDOMINAL PAIN: 0
BACK PAIN: 1
SHORTNESS OF BREATH: 1
CONSTIPATION: 0
CHEST TIGHTNESS: 0
CHOKING: 0
COUGH: 1
STRIDOR: 0
DIARRHEA: 0
GASTROINTESTINAL NEGATIVE: 1

## 2025-05-13 NOTE — PROGRESS NOTES
[facility] PROFESSIONAL [facility]  [address] [place] [address] [address]  [place] [id number]  Dept: [phone]  Dept Fax: [phone]  Loc: [phone]    Visit Date: 5/13/2025    Functionality Assessment/Goals Worksheet     On a scale of 0 (Does not Interfere) to 10 (Completely Interferes)     1.  Which number describes how during the past week pain has interfered with       the following:  A.  General Activity:  10  B.  Mood: 9  C.  Walking Ability:  10  D.  Normal Work (Includes both work outside the home and housework):  10  E.  Relations with Other People:   9  F.  Sleep:   9  G.  Enjoyment of Life:   10    2.  Patient Prefers to Take their Pain Medications:     [x]  On a regular basis   [x]  Only when necessary    []  Does not take pain medications    3.  What are the Patient's Goals/Expectations for Visiting Pain Management?     [x]  Learn about my pain    [x]  Receive Medication   [x]  Physical Therapy     []  Treat Depression   [x]  Receive Injections    []  Treat Sleep   []  Deal with Anxiety and Stress   []  Treat Opoid Dependence/Addiction   []  Other:      HPI:   Farhad Myles is a 64 y.o. male is here today for    Chief Complaint: Bilateral knee pain, low back pain     HPI   2 month follow up for medications and to reevaluate. Patients insurance denied left genicular nerve block that was ordered last visit as their policy is that they just do not cover this procedure for any reason. I completed a peer to peer review for this.     Farhad continues to have bilateral knee pain and low back pain.   Main compliant is bilateral knee pain worse left knee- constant aching sharp and throbbing pain with activity. Worse with walking.     Pain in low back starts in the center and spreads across axially- aching pain with intermittent sharp pain. States not as bad as knee pain     He did not end up seeing Rheumatology as he states they canceled his

## 2025-05-13 NOTE — TELEPHONE ENCOUNTER
Garland Crook in the lab, this patient only had mouth swab in for 5 minutes and then stated he needed to leave to go to another appointment. Ambreen states she will send the sample to the lab and that she also informed this patient that it's possible that it won't read accurately.

## 2025-05-13 NOTE — TELEPHONE ENCOUNTER
Yes, he had a nephrology appointment at 3 but he was 18 minutes to the appointment with me today. Thank you for letting me know we will monitor.

## 2025-05-15 ENCOUNTER — HOSPITAL ENCOUNTER (OUTPATIENT)
Age: 65
Setting detail: OBSERVATION
Discharge: HOME | End: 2025-05-17
Attending: STUDENT IN AN ORGANIZED HEALTH CARE EDUCATION/TRAINING PROGRAM
Payer: MEDICARE

## 2025-05-15 ENCOUNTER — APPOINTMENT (OUTPATIENT)
Dept: GENERAL RADIOLOGY | Age: 65
End: 2025-05-15
Payer: MEDICARE

## 2025-05-15 DIAGNOSIS — E87.70 HYPERVOLEMIA, UNSPECIFIED HYPERVOLEMIA TYPE: Primary | ICD-10-CM

## 2025-05-15 DIAGNOSIS — Z99.2 DEPENDENCE ON RENAL DIALYSIS: ICD-10-CM

## 2025-05-15 PROBLEM — N18.6 ESRD NEEDING DIALYSIS (HCC): Status: ACTIVE | Noted: 2025-05-15

## 2025-05-15 LAB
ALBUMIN SERPL BCG-MCNC: 3.9 G/DL (ref 3.4–4.9)
ALP SERPL-CCNC: 86 U/L (ref 40–129)
ALT SERPL W/O P-5'-P-CCNC: < 5 U/L (ref 10–50)
ANION GAP SERPL CALC-SCNC: 16 MEQ/L (ref 8–16)
AST SERPL-CCNC: 16 U/L (ref 10–50)
BASOPHILS ABSOLUTE: 0 THOU/MM3 (ref 0–0.1)
BASOPHILS NFR BLD AUTO: 0.4 %
BILIRUB SERPL-MCNC: 0.2 MG/DL (ref 0.3–1.2)
BUN SERPL-MCNC: 65 MG/DL (ref 8–23)
CALCIUM SERPL-MCNC: 9.2 MG/DL (ref 8.8–10.2)
CHLORIDE SERPL-SCNC: 99 MEQ/L (ref 98–111)
CO2 SERPL-SCNC: 26 MEQ/L (ref 22–29)
CREAT SERPL-MCNC: 7.8 MG/DL (ref 0.7–1.2)
DEPRECATED RDW RBC AUTO: 53.6 FL (ref 35–45)
EKG ATRIAL RATE: 75 BPM
EKG P AXIS: 46 DEGREES
EKG P-R INTERVAL: 188 MS
EKG Q-T INTERVAL: 454 MS
EKG QRS DURATION: 174 MS
EKG QTC CALCULATION (BAZETT): 506 MS
EKG R AXIS: 6 DEGREES
EKG T AXIS: -2 DEGREES
EKG VENTRICULAR RATE: 75 BPM
EOSINOPHIL NFR BLD AUTO: 4.3 %
EOSINOPHILS ABSOLUTE: 0.3 THOU/MM3 (ref 0–0.4)
ERYTHROCYTE [DISTWIDTH] IN BLOOD BY AUTOMATED COUNT: 14.8 % (ref 11.5–14.5)
GFR SERPL CREATININE-BSD FRML MDRD: 7 ML/MIN/1.73M2
GLUCOSE SERPL-MCNC: 108 MG/DL (ref 74–109)
HCT VFR BLD AUTO: 28.3 % (ref 42–52)
HGB BLD-MCNC: 9.1 GM/DL (ref 14–18)
IMM GRANULOCYTES # BLD AUTO: 0.03 THOU/MM3 (ref 0–0.07)
IMM GRANULOCYTES NFR BLD AUTO: 0.4 %
LIPASE SERPL-CCNC: 24 U/L (ref 13–60)
LYMPHOCYTES ABSOLUTE: 0.5 THOU/MM3 (ref 1–4.8)
LYMPHOCYTES NFR BLD AUTO: 6 %
MAGNESIUM SERPL-MCNC: 1.9 MG/DL (ref 1.6–2.6)
MCH RBC QN AUTO: 32.9 PG (ref 26–33)
MCHC RBC AUTO-ENTMCNC: 32.2 GM/DL (ref 32.2–35.5)
MCV RBC AUTO: 102.2 FL (ref 80–94)
MONOCYTES ABSOLUTE: 0.7 THOU/MM3 (ref 0.4–1.3)
MONOCYTES NFR BLD AUTO: 8.7 %
NEUTROPHILS ABSOLUTE: 6 THOU/MM3 (ref 1.8–7.7)
NEUTROPHILS NFR BLD AUTO: 80.2 %
NRBC BLD AUTO-RTO: 0 /100 WBC
NT-PROBNP SERPL IA-MCNC: ABNORMAL PG/ML (ref 0–124)
OSMOLALITY SERPL CALC.SUM OF ELEC: 300.5 MOSMOL/KG (ref 275–300)
PLATELET # BLD AUTO: 165 THOU/MM3 (ref 130–400)
PMV BLD AUTO: 8.9 FL (ref 9.4–12.4)
POTASSIUM SERPL-SCNC: 4.2 MEQ/L (ref 3.5–5.2)
PROT SERPL-MCNC: 7.4 G/DL (ref 6.4–8.3)
RBC # BLD AUTO: 2.77 MILL/MM3 (ref 4.7–6.1)
SODIUM SERPL-SCNC: 141 MEQ/L (ref 135–145)
TROPONIN, HIGH SENSITIVITY: 96 NG/L (ref 0–12)
WBC # BLD AUTO: 7.5 THOU/MM3 (ref 4.8–10.8)

## 2025-05-15 PROCEDURE — 2500000003 HC RX 250 WO HCPCS: Performed by: PHYSICIAN ASSISTANT

## 2025-05-15 PROCEDURE — 80053 COMPREHEN METABOLIC PANEL: CPT

## 2025-05-15 PROCEDURE — 6370000000 HC RX 637 (ALT 250 FOR IP): Performed by: PHYSICIAN ASSISTANT

## 2025-05-15 PROCEDURE — 85025 COMPLETE CBC W/AUTO DIFF WBC: CPT

## 2025-05-15 PROCEDURE — 93010 ELECTROCARDIOGRAM REPORT: CPT | Performed by: INTERNAL MEDICINE

## 2025-05-15 PROCEDURE — 83880 ASSAY OF NATRIURETIC PEPTIDE: CPT

## 2025-05-15 PROCEDURE — 99285 EMERGENCY DEPT VISIT HI MDM: CPT

## 2025-05-15 PROCEDURE — G0378 HOSPITAL OBSERVATION PER HR: HCPCS

## 2025-05-15 PROCEDURE — 71045 X-RAY EXAM CHEST 1 VIEW: CPT

## 2025-05-15 PROCEDURE — 36415 COLL VENOUS BLD VENIPUNCTURE: CPT

## 2025-05-15 PROCEDURE — 99223 1ST HOSP IP/OBS HIGH 75: CPT | Performed by: PHYSICIAN ASSISTANT

## 2025-05-15 PROCEDURE — 83690 ASSAY OF LIPASE: CPT

## 2025-05-15 PROCEDURE — 83735 ASSAY OF MAGNESIUM: CPT

## 2025-05-15 PROCEDURE — 93005 ELECTROCARDIOGRAM TRACING: CPT

## 2025-05-15 PROCEDURE — 84484 ASSAY OF TROPONIN QUANT: CPT

## 2025-05-15 RX ORDER — ATORVASTATIN CALCIUM 40 MG/1
40 TABLET, FILM COATED ORAL DAILY
Status: DISCONTINUED | OUTPATIENT
Start: 2025-05-16 | End: 2025-05-17 | Stop reason: HOSPADM

## 2025-05-15 RX ORDER — HEPARIN SODIUM 5000 [USP'U]/ML
5000 INJECTION, SOLUTION INTRAVENOUS; SUBCUTANEOUS EVERY 8 HOURS SCHEDULED
Status: DISCONTINUED | OUTPATIENT
Start: 2025-05-15 | End: 2025-05-17 | Stop reason: HOSPADM

## 2025-05-15 RX ORDER — OXYCODONE AND ACETAMINOPHEN 10; 325 MG/1; MG/1
1 TABLET ORAL EVERY 8 HOURS PRN
Refills: 0 | Status: DISCONTINUED | OUTPATIENT
Start: 2025-05-15 | End: 2025-05-16

## 2025-05-15 RX ORDER — POLYETHYLENE GLYCOL 3350 17 G/17G
17 POWDER, FOR SOLUTION ORAL DAILY PRN
Status: DISCONTINUED | OUTPATIENT
Start: 2025-05-15 | End: 2025-05-17 | Stop reason: HOSPADM

## 2025-05-15 RX ORDER — SODIUM CHLORIDE 0.9 % (FLUSH) 0.9 %
10 SYRINGE (ML) INJECTION PRN
Status: DISCONTINUED | OUTPATIENT
Start: 2025-05-15 | End: 2025-05-17 | Stop reason: HOSPADM

## 2025-05-15 RX ORDER — SODIUM CHLORIDE 9 MG/ML
INJECTION, SOLUTION INTRAVENOUS PRN
Status: DISCONTINUED | OUTPATIENT
Start: 2025-05-15 | End: 2025-05-17 | Stop reason: HOSPADM

## 2025-05-15 RX ORDER — VENLAFAXINE HYDROCHLORIDE 150 MG/1
150 CAPSULE, EXTENDED RELEASE ORAL DAILY
Status: DISCONTINUED | OUTPATIENT
Start: 2025-05-16 | End: 2025-05-17 | Stop reason: HOSPADM

## 2025-05-15 RX ORDER — PRIMIDONE 50 MG/1
100 TABLET ORAL NIGHTLY
Status: DISCONTINUED | OUTPATIENT
Start: 2025-05-16 | End: 2025-05-17 | Stop reason: HOSPADM

## 2025-05-15 RX ORDER — MIRTAZAPINE 7.5 MG/1
7.5 TABLET, FILM COATED ORAL NIGHTLY
Status: DISCONTINUED | OUTPATIENT
Start: 2025-05-16 | End: 2025-05-16

## 2025-05-15 RX ORDER — ALBUTEROL SULFATE 90 UG/1
2 INHALANT RESPIRATORY (INHALATION) EVERY 6 HOURS PRN
Status: DISCONTINUED | OUTPATIENT
Start: 2025-05-15 | End: 2025-05-17 | Stop reason: HOSPADM

## 2025-05-15 RX ORDER — NITROGLYCERIN 0.4 MG/1
0.4 TABLET SUBLINGUAL EVERY 5 MIN PRN
Status: DISCONTINUED | OUTPATIENT
Start: 2025-05-15 | End: 2025-05-17 | Stop reason: HOSPADM

## 2025-05-15 RX ORDER — CARVEDILOL 25 MG/1
25 TABLET ORAL 2 TIMES DAILY WITH MEALS
Status: DISCONTINUED | OUTPATIENT
Start: 2025-05-15 | End: 2025-05-17 | Stop reason: HOSPADM

## 2025-05-15 RX ORDER — ASPIRIN 81 MG/1
81 TABLET ORAL DAILY
Status: DISCONTINUED | OUTPATIENT
Start: 2025-05-16 | End: 2025-05-17 | Stop reason: HOSPADM

## 2025-05-15 RX ORDER — LIDOCAINE 4 G/G
2 PATCH TOPICAL DAILY
Status: DISCONTINUED | OUTPATIENT
Start: 2025-05-16 | End: 2025-05-17 | Stop reason: HOSPADM

## 2025-05-15 RX ORDER — ONDANSETRON 4 MG/1
4 TABLET, ORALLY DISINTEGRATING ORAL EVERY 8 HOURS PRN
Status: DISCONTINUED | OUTPATIENT
Start: 2025-05-15 | End: 2025-05-17 | Stop reason: HOSPADM

## 2025-05-15 RX ORDER — ACETAMINOPHEN 325 MG/1
650 TABLET ORAL EVERY 6 HOURS PRN
Status: DISCONTINUED | OUTPATIENT
Start: 2025-05-15 | End: 2025-05-17 | Stop reason: HOSPADM

## 2025-05-15 RX ORDER — PANTOPRAZOLE SODIUM 40 MG/1
40 TABLET, DELAYED RELEASE ORAL
Status: DISCONTINUED | OUTPATIENT
Start: 2025-05-16 | End: 2025-05-17 | Stop reason: HOSPADM

## 2025-05-15 RX ORDER — SEVELAMER CARBONATE 800 MG/1
800 TABLET, FILM COATED ORAL
Status: DISCONTINUED | OUTPATIENT
Start: 2025-05-16 | End: 2025-05-17 | Stop reason: HOSPADM

## 2025-05-15 RX ORDER — TAMSULOSIN HYDROCHLORIDE 0.4 MG/1
0.4 CAPSULE ORAL DAILY
Status: DISCONTINUED | OUTPATIENT
Start: 2025-05-16 | End: 2025-05-17 | Stop reason: HOSPADM

## 2025-05-15 RX ORDER — ROPINIROLE 0.5 MG/1
0.5 TABLET, FILM COATED ORAL NIGHTLY
Status: DISCONTINUED | OUTPATIENT
Start: 2025-05-15 | End: 2025-05-17 | Stop reason: HOSPADM

## 2025-05-15 RX ORDER — ONDANSETRON 2 MG/ML
4 INJECTION INTRAMUSCULAR; INTRAVENOUS EVERY 6 HOURS PRN
Status: DISCONTINUED | OUTPATIENT
Start: 2025-05-15 | End: 2025-05-17 | Stop reason: HOSPADM

## 2025-05-15 RX ORDER — LEVOTHYROXINE SODIUM 100 UG/1
200 TABLET ORAL DAILY
Status: DISCONTINUED | OUTPATIENT
Start: 2025-05-16 | End: 2025-05-17 | Stop reason: HOSPADM

## 2025-05-15 RX ORDER — AMLODIPINE BESYLATE 10 MG/1
10 TABLET ORAL DAILY
Status: DISCONTINUED | OUTPATIENT
Start: 2025-05-16 | End: 2025-05-17 | Stop reason: HOSPADM

## 2025-05-15 RX ORDER — PRIMIDONE 50 MG/1
100 TABLET ORAL NIGHTLY
Status: DISCONTINUED | OUTPATIENT
Start: 2025-05-15 | End: 2025-05-15

## 2025-05-15 RX ORDER — CLOPIDOGREL BISULFATE 75 MG/1
75 TABLET ORAL DAILY
Status: DISCONTINUED | OUTPATIENT
Start: 2025-05-16 | End: 2025-05-17 | Stop reason: HOSPADM

## 2025-05-15 RX ORDER — PRIMIDONE 50 MG/1
50 TABLET ORAL DAILY
Status: DISCONTINUED | OUTPATIENT
Start: 2025-05-16 | End: 2025-05-17 | Stop reason: HOSPADM

## 2025-05-15 RX ORDER — MONTELUKAST SODIUM 10 MG/1
10 TABLET ORAL NIGHTLY
Status: DISCONTINUED | OUTPATIENT
Start: 2025-05-15 | End: 2025-05-17 | Stop reason: HOSPADM

## 2025-05-15 RX ORDER — SODIUM CHLORIDE 0.9 % (FLUSH) 0.9 %
10 SYRINGE (ML) INJECTION EVERY 12 HOURS SCHEDULED
Status: DISCONTINUED | OUTPATIENT
Start: 2025-05-15 | End: 2025-05-17 | Stop reason: HOSPADM

## 2025-05-15 RX ORDER — ACETAMINOPHEN 650 MG/1
650 SUPPOSITORY RECTAL EVERY 6 HOURS PRN
Status: DISCONTINUED | OUTPATIENT
Start: 2025-05-15 | End: 2025-05-17 | Stop reason: HOSPADM

## 2025-05-15 RX ADMIN — SODIUM CHLORIDE, PRESERVATIVE FREE 10 ML: 5 INJECTION INTRAVENOUS at 22:52

## 2025-05-15 RX ADMIN — ROPINIROLE HYDROCHLORIDE 0.5 MG: 0.5 TABLET, FILM COATED ORAL at 22:50

## 2025-05-15 RX ADMIN — MONTELUKAST 10 MG: 10 TABLET, FILM COATED ORAL at 22:51

## 2025-05-15 RX ADMIN — OXYCODONE AND ACETAMINOPHEN 1 TABLET: 10; 325 TABLET ORAL at 23:00

## 2025-05-15 RX ADMIN — CARVEDILOL 25 MG: 25 TABLET, FILM COATED ORAL at 22:51

## 2025-05-15 ASSESSMENT — PAIN DESCRIPTION - ORIENTATION: ORIENTATION: MID

## 2025-05-15 ASSESSMENT — PAIN SCALES - GENERAL
PAINLEVEL_OUTOF10: 8
PAINLEVEL_OUTOF10: 4

## 2025-05-15 ASSESSMENT — PAIN SCALES - WONG BAKER: WONGBAKER_NUMERICALRESPONSE: NO HURT

## 2025-05-15 ASSESSMENT — PAIN DESCRIPTION - DESCRIPTORS: DESCRIPTORS: ACHING

## 2025-05-15 ASSESSMENT — PAIN DESCRIPTION - LOCATION: LOCATION: BACK

## 2025-05-15 ASSESSMENT — PAIN - FUNCTIONAL ASSESSMENT: PAIN_FUNCTIONAL_ASSESSMENT: NONE - DENIES PAIN

## 2025-05-15 NOTE — ED PROVIDER NOTES
ADHESIONS, EXPLANT OF INFECTED MESH performed by Patrick Bond MD at [facility] OR    APPENDECTOMY      CARDIAC CATHETERIZATION      no stents    CARDIAC PROCEDURE Bilateral 5/13/2024    Left heart cath / coronary angiography performed by Abiola Segura MD at [facility] CARDIAC CATH LAB    CARDIAC PROCEDURE N/A 5/13/2024    Percutaneous coronary intervention performed by Abiola Segura MD at [facility] CARDIAC CATH LAB    CARPAL TUNNEL RELEASE Bilateral     CIRCUMCISION N/A 3/2/2023    Cystoscopy Circumcision performed by Suresh Gold MD at [facility] OR    COLONOSCOPY  2017    Dr. Varner    COLONOSCOPY N/A 7/7/2023    Colonoscopy performed by Florentino Mccormack MD at [facility] Endoscopy    CYSTOSCOPY N/A 3/2/2023    CYSTOSCOPY performed by Suresh Gold MD at [facility] OR    DIALYSIS FISTULA CREATION Left 12/27/2022    LEFT ARM ARM ARTERIO-VENOUS FISTULA CREATION performed by Nathaniel Callaway MD at [facility] OR    DILATATION, ESOPHAGUS      HERNIA REPAIR      x3 surgeries with 14 repairs    KNEE SURGERY Right 1980's    cartilage    SD EXPLORATORY LAPAROTOMY CELIOTOMY W/WO BIOPSY SPX N/A 2/5/2018    ABDOMINAL EXPLORATION WITH LYSIS OF COMPLICATED ADHESIONS WITH AN EXTENDED RIGHT COLON RESECTION performed by Patrick Bond MD at [facility] OR    UPPER GASTROINTESTINAL ENDOSCOPY N/A 7/7/2023    EGD performed by Florentino Mccormack MD at [facility] Endoscopy         MEDICATIONS     Current Facility-Administered Medications:     sodium chloride flush 0.9 % injection 10 mL, 10 mL, IntraVENous, 2 times per day, Gonzalez Chávez PA    sodium chloride flush 0.9 % injection 10 mL, 10 mL, IntraVENous, PRN, Gonzalez Chávez PA    0.9 % sodium chloride infusion, , IntraVENous, PRN, Gonzalez Chávez PA    heparin (porcine) injection 5,000 Units, 5,000 Units, SubCUTAneous, 3 times per day, Gonzalez Chávez PA    Current Outpatient Medications:     rOPINIRole (REQUIP) 0.5 MG tablet, 2 tablets at nighttime, Disp: 180 tablet, Rfl: 3    oxyCODONE-acetaminophen (PERCOCET)  MG per tablet, Take 1  4 mg (has no administration in time range)              (A negative COVID-19 test should be interpreted as COVID no longer suspected unless otherwise noted in this encounter documentation note)    PROCEDURES: (None if blank)  Procedures:       MEDICATION CHANGES     New Prescriptions    No medications on file         FINAL DISPOSITION   MDM  Patient 64-year-old male presented to ED today for worsening fatigue, shortness of breath, lack of dialysis over the past week. Patient ESRD performs dialysis at home by left arm fistula 4 times weekly.  Patient states he has been unable to perform dialysis since 5/9/2025.  Patient states that he feels his abdomen is more distended today and subjectively more short of breath.  Patient continues to be saturating low 90s on his baseline 4 L nasal cannula.  Patient's electrolytes stable with no emergent EKG changes.  Potassium of 4.2.  Nephrology consulted for inpatient dialysis.  Admitted to hospitalist service for further evaluation and management.    Shared Decision-Making was performed, disposition discussed with the patient/family and questions answered.    Outpatient follow up (If applicable):  No follow-up provider specified.      Clinical Impression:  Final diagnoses:   Hypervolemia, unspecified hypervolemia type   Dependence on renal dialysis       Condition: condition: stable  Dispo: Admit to hospitalist  DISPOSITION Decision To Admit 05/15/2025 08:16:15 PM   DISPOSITION CONDITION Stable           This transcription was electronically signed. It was dictated by use of voice recognition software and electronically transcribed. The transcription may contain errors not detected in proofreading.

## 2025-05-15 NOTE — ED TRIAGE NOTES
Pt to ED through triage with c/c weakness. Pt reports missing dialysis multiple times since 5/9. Supposed to get ddialysis x4/week. Wears 4L NC at all times. EKG completed on arrival.

## 2025-05-16 PROBLEM — Z51.5 PALLIATIVE CARE PATIENT: Status: ACTIVE | Noted: 2025-05-16

## 2025-05-16 LAB
ALBUMIN SERPL BCG-MCNC: 3.5 G/DL (ref 3.4–4.9)
ALP SERPL-CCNC: 71 U/L (ref 40–129)
ALT SERPL W/O P-5'-P-CCNC: < 5 U/L (ref 10–50)
ANION GAP SERPL CALC-SCNC: 14 MEQ/L (ref 8–16)
AST SERPL-CCNC: 13 U/L (ref 10–50)
BASOPHILS ABSOLUTE: 0 THOU/MM3 (ref 0–0.1)
BASOPHILS NFR BLD AUTO: 0.5 %
BILIRUB SERPL-MCNC: 0.3 MG/DL (ref 0.3–1.2)
BUN SERPL-MCNC: 69 MG/DL (ref 8–23)
CALCIUM SERPL-MCNC: 8.7 MG/DL (ref 8.8–10.2)
CHLORIDE SERPL-SCNC: 100 MEQ/L (ref 98–111)
CO2 SERPL-SCNC: 25 MEQ/L (ref 22–29)
CREAT SERPL-MCNC: 8 MG/DL (ref 0.7–1.2)
DEPRECATED RDW RBC AUTO: 55.5 FL (ref 35–45)
EOSINOPHIL NFR BLD AUTO: 4.9 %
EOSINOPHILS ABSOLUTE: 0.3 THOU/MM3 (ref 0–0.4)
ERYTHROCYTE [DISTWIDTH] IN BLOOD BY AUTOMATED COUNT: 14.6 % (ref 11.5–14.5)
GFR SERPL CREATININE-BSD FRML MDRD: 7 ML/MIN/1.73M2
GLUCOSE BLD STRIP.AUTO-MCNC: 111 MG/DL (ref 70–108)
GLUCOSE SERPL-MCNC: 90 MG/DL (ref 74–109)
HCT VFR BLD AUTO: 26.4 % (ref 42–52)
HGB BLD-MCNC: 8.4 GM/DL (ref 14–18)
IMM GRANULOCYTES # BLD AUTO: 0.02 THOU/MM3 (ref 0–0.07)
IMM GRANULOCYTES NFR BLD AUTO: 0.3 %
LYMPHOCYTES ABSOLUTE: 0.5 THOU/MM3 (ref 1–4.8)
LYMPHOCYTES NFR BLD AUTO: 8.4 %
MCH RBC QN AUTO: 33.3 PG (ref 26–33)
MCHC RBC AUTO-ENTMCNC: 31.8 GM/DL (ref 32.2–35.5)
MCV RBC AUTO: 104.8 FL (ref 80–94)
MONOCYTES ABSOLUTE: 0.8 THOU/MM3 (ref 0.4–1.3)
MONOCYTES NFR BLD AUTO: 12.3 %
NEUTROPHILS ABSOLUTE: 4.6 THOU/MM3 (ref 1.8–7.7)
NEUTROPHILS NFR BLD AUTO: 73.6 %
NRBC BLD AUTO-RTO: 0 /100 WBC
PHOSPHATE SERPL-MCNC: 7.2 MG/DL (ref 2.5–4.5)
PLATELET # BLD AUTO: 159 THOU/MM3 (ref 130–400)
PMV BLD AUTO: 9 FL (ref 9.4–12.4)
POTASSIUM SERPL-SCNC: 4.5 MEQ/L (ref 3.5–5.2)
PROT SERPL-MCNC: 6.6 G/DL (ref 6.4–8.3)
RBC # BLD AUTO: 2.52 MILL/MM3 (ref 4.7–6.1)
SODIUM SERPL-SCNC: 139 MEQ/L (ref 135–145)
WBC # BLD AUTO: 6.3 THOU/MM3 (ref 4.8–10.8)

## 2025-05-16 PROCEDURE — 84100 ASSAY OF PHOSPHORUS: CPT

## 2025-05-16 PROCEDURE — 94660 CPAP INITIATION&MGMT: CPT

## 2025-05-16 PROCEDURE — 82948 REAGENT STRIP/BLOOD GLUCOSE: CPT

## 2025-05-16 PROCEDURE — 96372 THER/PROPH/DIAG INJ SC/IM: CPT

## 2025-05-16 PROCEDURE — 6370000000 HC RX 637 (ALT 250 FOR IP): Performed by: PHYSICIAN ASSISTANT

## 2025-05-16 PROCEDURE — G0257 UNSCHED DIALYSIS ESRD PT HOS: HCPCS

## 2025-05-16 PROCEDURE — 6360000002 HC RX W HCPCS: Performed by: PHYSICIAN ASSISTANT

## 2025-05-16 PROCEDURE — 36415 COLL VENOUS BLD VENIPUNCTURE: CPT

## 2025-05-16 PROCEDURE — 80053 COMPREHEN METABOLIC PANEL: CPT

## 2025-05-16 PROCEDURE — 2700000000 HC OXYGEN THERAPY PER DAY

## 2025-05-16 PROCEDURE — G0378 HOSPITAL OBSERVATION PER HR: HCPCS

## 2025-05-16 PROCEDURE — 99233 SBSQ HOSP IP/OBS HIGH 50: CPT | Performed by: STUDENT IN AN ORGANIZED HEALTH CARE EDUCATION/TRAINING PROGRAM

## 2025-05-16 PROCEDURE — 2500000003 HC RX 250 WO HCPCS: Performed by: PHYSICIAN ASSISTANT

## 2025-05-16 PROCEDURE — 90935 HEMODIALYSIS ONE EVALUATION: CPT

## 2025-05-16 PROCEDURE — 85025 COMPLETE CBC W/AUTO DIFF WBC: CPT

## 2025-05-16 PROCEDURE — 6370000000 HC RX 637 (ALT 250 FOR IP)

## 2025-05-16 PROCEDURE — 99222 1ST HOSP IP/OBS MODERATE 55: CPT | Performed by: INTERNAL MEDICINE

## 2025-05-16 PROCEDURE — 94761 N-INVAS EAR/PLS OXIMETRY MLT: CPT

## 2025-05-16 RX ORDER — INSULIN LISPRO 100 [IU]/ML
0-4 INJECTION, SOLUTION INTRAVENOUS; SUBCUTANEOUS
Status: DISCONTINUED | OUTPATIENT
Start: 2025-05-16 | End: 2025-05-16

## 2025-05-16 RX ORDER — GLUCAGON 1 MG/ML
1 KIT INJECTION PRN
Status: DISCONTINUED | OUTPATIENT
Start: 2025-05-16 | End: 2025-05-17 | Stop reason: HOSPADM

## 2025-05-16 RX ORDER — OXYCODONE AND ACETAMINOPHEN 10; 325 MG/1; MG/1
1 TABLET ORAL EVERY 4 HOURS PRN
Refills: 0 | Status: DISCONTINUED | OUTPATIENT
Start: 2025-05-16 | End: 2025-05-17 | Stop reason: HOSPADM

## 2025-05-16 RX ORDER — DEXTROSE MONOHYDRATE 100 MG/ML
INJECTION, SOLUTION INTRAVENOUS CONTINUOUS PRN
Status: DISCONTINUED | OUTPATIENT
Start: 2025-05-16 | End: 2025-05-17 | Stop reason: HOSPADM

## 2025-05-16 RX ORDER — MIRTAZAPINE 15 MG/1
15 TABLET, FILM COATED ORAL NIGHTLY
Status: DISCONTINUED | OUTPATIENT
Start: 2025-05-16 | End: 2025-05-17 | Stop reason: HOSPADM

## 2025-05-16 RX ORDER — BUMETANIDE 1 MG/1
2 TABLET ORAL DAILY
Status: DISCONTINUED | OUTPATIENT
Start: 2025-05-16 | End: 2025-05-17 | Stop reason: HOSPADM

## 2025-05-16 RX ADMIN — CARVEDILOL 25 MG: 25 TABLET, FILM COATED ORAL at 16:41

## 2025-05-16 RX ADMIN — PRIMIDONE 100 MG: 50 TABLET ORAL at 00:54

## 2025-05-16 RX ADMIN — AMLODIPINE BESYLATE 10 MG: 10 TABLET ORAL at 13:10

## 2025-05-16 RX ADMIN — OXYCODONE AND ACETAMINOPHEN 1 TABLET: 10; 325 TABLET ORAL at 21:36

## 2025-05-16 RX ADMIN — ROPINIROLE HYDROCHLORIDE 0.5 MG: 0.5 TABLET, FILM COATED ORAL at 20:32

## 2025-05-16 RX ADMIN — SEVELAMER CARBONATE 800 MG: 800 TABLET, FILM COATED ORAL at 13:23

## 2025-05-16 RX ADMIN — VENLAFAXINE HYDROCHLORIDE 150 MG: 150 CAPSULE, EXTENDED RELEASE ORAL at 13:10

## 2025-05-16 RX ADMIN — OXYCODONE AND ACETAMINOPHEN 1 TABLET: 10; 325 TABLET ORAL at 13:18

## 2025-05-16 RX ADMIN — PRIMIDONE 100 MG: 50 TABLET ORAL at 20:32

## 2025-05-16 RX ADMIN — MIRTAZAPINE 15 MG: 15 TABLET, FILM COATED ORAL at 20:32

## 2025-05-16 RX ADMIN — TAMSULOSIN HYDROCHLORIDE 0.4 MG: 0.4 CAPSULE ORAL at 13:10

## 2025-05-16 RX ADMIN — PRIMIDONE 50 MG: 50 TABLET ORAL at 13:10

## 2025-05-16 RX ADMIN — ASPIRIN 81 MG: 81 TABLET, COATED ORAL at 13:15

## 2025-05-16 RX ADMIN — MONTELUKAST 10 MG: 10 TABLET, FILM COATED ORAL at 20:32

## 2025-05-16 RX ADMIN — PANTOPRAZOLE SODIUM 40 MG: 40 TABLET, DELAYED RELEASE ORAL at 06:21

## 2025-05-16 RX ADMIN — ATORVASTATIN CALCIUM 40 MG: 40 TABLET, FILM COATED ORAL at 13:10

## 2025-05-16 RX ADMIN — SEVELAMER CARBONATE 800 MG: 800 TABLET, FILM COATED ORAL at 16:41

## 2025-05-16 RX ADMIN — HEPARIN SODIUM 5000 UNITS: 5000 INJECTION INTRAVENOUS; SUBCUTANEOUS at 21:36

## 2025-05-16 RX ADMIN — SODIUM CHLORIDE, PRESERVATIVE FREE 10 ML: 5 INJECTION INTRAVENOUS at 13:16

## 2025-05-16 RX ADMIN — LEVOTHYROXINE SODIUM 200 MCG: 0.1 TABLET ORAL at 06:21

## 2025-05-16 RX ADMIN — BUMETANIDE 2 MG: 1 TABLET ORAL at 13:16

## 2025-05-16 RX ADMIN — SODIUM CHLORIDE, PRESERVATIVE FREE 10 ML: 5 INJECTION INTRAVENOUS at 20:33

## 2025-05-16 RX ADMIN — CLOPIDOGREL BISULFATE 75 MG: 75 TABLET, FILM COATED ORAL at 13:15

## 2025-05-16 ASSESSMENT — PAIN DESCRIPTION - ORIENTATION
ORIENTATION: RIGHT;LEFT
ORIENTATION: RIGHT;LEFT;LOWER

## 2025-05-16 ASSESSMENT — ENCOUNTER SYMPTOMS
VOMITING: 0
ABDOMINAL PAIN: 0
EYES NEGATIVE: 1
NAUSEA: 0
SHORTNESS OF BREATH: 1
DIARRHEA: 0
BACK PAIN: 0
COUGH: 0

## 2025-05-16 ASSESSMENT — PAIN SCALES - WONG BAKER
WONGBAKER_NUMERICALRESPONSE: NO HURT
WONGBAKER_NUMERICALRESPONSE: NO HURT

## 2025-05-16 ASSESSMENT — PAIN DESCRIPTION - DESCRIPTORS
DESCRIPTORS: ACHING;SORE
DESCRIPTORS: ACHING

## 2025-05-16 ASSESSMENT — PAIN SCALES - GENERAL
PAINLEVEL_OUTOF10: 8

## 2025-05-16 ASSESSMENT — PAIN DESCRIPTION - LOCATION
LOCATION: BACK;KNEE
LOCATION: BACK

## 2025-05-16 NOTE — RT PROTOCOL NOTE
RT Inhaler-Nebulizer Bronchodilator Protocol Note    There is a bronchodilator order in the chart from a provider indicating to follow the RT Bronchodilator Protocol and there is an “Initiate RT Inhaler-Nebulizer Bronchodilator Protocol” order as well (see protocol at bottom of note).    CXR Findings:  XR CHEST PORTABLE  Result Date: 5/15/2025  Impression: 1. Increased perihilar markings bilaterally may represent pulmonary edema or atypical pneumonia This document has been electronically signed by: Diana Nazario MD on 05/15/2025 09:07 PM      The findings from the last RT Protocol Assessment were as follows:   History Pulmonary Disease: Chronic pulmonary disease  Respiratory Pattern: Regular pattern and RR 12-20 bpm  Breath Sounds: Clear breath sounds  Cough: Strong, spontaneous, non-productive  Indication for Bronchodilator Therapy: On home bronchodilators  Bronchodilator Assessment Score: 2    Aerosolized bronchodilator medication orders have been revised according to the RT Inhaler-Nebulizer Bronchodilator Protocol below.    Respiratory Therapist to perform RT Therapy Protocol Assessment initially then follow the protocol.  Repeat RT Therapy Protocol Assessment PRN for score 0-3 or on second treatment, BID, and PRN for scores above 3.    No Indications - adjust the frequency to every 6 hours PRN wheezing or bronchospasm, if no treatments needed after 48 hours then discontinue using Per Protocol order mode.     If indication present, adjust the RT bronchodilator orders based on the Bronchodilator Assessment Score as indicated below.  Use Inhaler orders unless patient has one or more of the following: on home nebulizer, not able to hold breath for 10 seconds, is not alert and oriented, cannot activate and use MDI correctly, or respiratory rate 25 breaths per minute or more, then use the equivalent nebulizer order(s) with same Frequency and PRN reasons based on the score.  If a patient is on this medication at

## 2025-05-16 NOTE — FLOWSHEET NOTE
05/16/25 1200   Vital Signs   BP (!) 137/50   Temp 98.4 °F (36.9 °C)   Pulse 88   Respirations 18   SpO2 92 %   Post-Hemodialysis Assessment   Post-Treatment Procedures Blood returned;Access bleeding time < 10 minutes   Machine Disinfection Process Acid/Vinegar Clean;Heat Disinfect;Exterior Machine Disinfection   Rinseback Volume (ml) 400 ml   Blood Volume Processed (Liters) 81 L   Dialyzer Clearance Lightly streaked   Duration of Treatment (minutes) 240 minutes   Heparin Amount Administered During Treatment (mL) 0 mL   Hemodialysis Intake (ml) 400 ml   Hemodialysis Output (ml) 4981 ml   NET Removed (ml) 4581     stable 4 hour treatment. 4.5 liters net uf. pressure held both cannulation sites x 10 minutes, dressing dry intact upon discharge from unit.

## 2025-05-16 NOTE — PROGRESS NOTES
PROGRESS NOTE      Patient:  Farhad Myles  Unit/Bed:6K-09/009-A  YOB: 1960  MRN: [id number]   Acct: [id number]    PCP: Dulce Maria Crespo MD    Date of Admission: 5/15/2025 LOS: 0    Date of Evaluation:  5/16/2025    Anticipated Discharge: 1-2 days    Assessment/Plan:    Acute on chronic diastolic heart failure. Fluid overload 2/2 ESRD with missed HD.   Per patient last HD on 5/9, has been unable to perform at home since then due to ongoing personal circumstances. Last TTE 1/2/2024 with EF 50%. BNP on admission of 85855, prior BNP on 12/2024 of 20332. CXR on 5/15 with perihilar markings suggesting pulmonary edema.   Continue home med Bumex.  Nephrology following for fluid overload and HD.     ESRD on home HD.  HD 4 times a week. Per nephrology noted to be noncompliant with HD.  Patient reports difficulty with HD at home, has not received HD since 5/9. Noted to be fluid overloaded on exam. Nephrology was consulted on admission.  Creatinine on admission 7.8-> 8 on 5/16 prior to dialysis. Baseline creatinine 3.6-51.Serum osmolality on 5/16 of 300.5.  Nephrology following, appreciate recommendations.  HD on 516, planning for HD again on 5/17.  Continue home med Renvela.  Continue to monitor BMP.     Restrictive lung disease.  Likely 2/2 comorbidities including ESRD and CHF with frequent exacerbations.  Patient uses 4 L of supplemental oxygen at home with exertion and 2 L at rest via NC.  Follows with outpatient pulmonology Rasheed Jade NP. Continue Singulair.     Elevated troponin. Likely 2/2 ESRD. Noted to be 96 on admission, has been elevated during all prior admissions.  EKG on 5/15 with NSR, RBBB, and T wave changes consistent with prior. No chest pain. Continue to monitor telemetry.     CAD s/p PCI with KAVITA x 2 on 5/2024.  No ischemic workup since.  Continue DAPT, Coreg, statin, amlodipine.  EKG on 5/15 with NSR, RBBB, and T wave changes consistent with prior.  Follows with outpatient  which she states is chronic. Denies headache, vision changes, chest pain, palpitations, shortness of breath, abdominal pain, nausea, vomiting, diarrhea, constipation, peripheral edema.  Patient still produces urine however very little.      PMH, SURGICAL HX, FH, SOCIAL HX reviewed and updated as needed.    Medications:  Reviewed    Infusion Medications    dextrose      sodium chloride       Scheduled Medications    bumetanide  2 mg Oral Daily    mirtazapine  15 mg Oral Nightly    sodium chloride flush  10 mL IntraVENous 2 times per day    heparin (porcine)  5,000 Units SubCUTAneous 3 times per day    amLODIPine  10 mg Oral Daily    aspirin EC  81 mg Oral Daily    atorvastatin  40 mg Oral Daily    carvedilol  25 mg Oral BID WC    clopidogrel  75 mg Oral Daily    levothyroxine  200 mcg Oral Daily    lidocaine  2 patch TransDERmal Daily    montelukast  10 mg Oral Nightly    pantoprazole  40 mg Oral QAM AC    rOPINIRole  0.5 mg Oral Nightly    tamsulosin  0.4 mg Oral Daily    venlafaxine  150 mg Oral Daily    sevelamer  800 mg Oral TID WC    primidone  50 mg Oral Daily    And    primidone  100 mg Oral Nightly     PRN Meds: glucose, dextrose bolus **OR** dextrose bolus, glucagon (rDNA), dextrose, oxyCODONE-acetaminophen, sodium chloride flush, sodium chloride, ondansetron **OR** ondansetron, polyethylene glycol, acetaminophen **OR** acetaminophen, melatonin, albuterol sulfate HFA, nitroGLYCERIN      Intake/Output Summary (Last 24 hours) at 5/16/2025 1628  Last data filed at 5/16/2025 1200  Gross per 24 hour   Intake 400 ml   Output 4981 ml   Net -4581 ml       Exam:  BP (!) 157/62   Pulse 64   Temp 98.1 °F (36.7 °C) (Oral)   Resp 20   Ht 1.854 m (6' 1\")   Wt (!) 153.2 kg (337 lb 11.9 oz)   SpO2 93%   BMI 44.56 kg/m²     Physical Exam  Constitutional:       Appearance: He is obese.   HENT:      Head: Normocephalic and atraumatic.      Mouth/Throat:      Mouth: Mucous membranes are moist.      Pharynx: Oropharynx is

## 2025-05-16 NOTE — PLAN OF CARE
Problem: Chronic Conditions and Co-morbidities  Goal: Patient's chronic conditions and co-morbidity symptoms are monitored and maintained or improved  Outcome: Progressing  Flowsheets (Taken 5/16/2025 0700)  Care Plan - Patient's Chronic Conditions and Co-Morbidity Symptoms are Monitored and Maintained or Improved: Monitor and assess patient's chronic conditions and comorbid symptoms for stability, deterioration, or improvement     Problem: Discharge Planning  Goal: Discharge to home or other facility with appropriate resources  Outcome: Progressing  Flowsheets (Taken 5/16/2025 0700)  Discharge to home or other facility with appropriate resources: Identify barriers to discharge with patient and caregiver     Problem: Pain  Goal: Verbalizes/displays adequate comfort level or baseline comfort level  Outcome: Progressing  Flowsheets  Taken 5/16/2025 1725  Verbalizes/displays adequate comfort level or baseline comfort level:   Encourage patient to monitor pain and request assistance   Assess pain using appropriate pain scale  Taken 5/16/2025 0715  Verbalizes/displays adequate comfort level or baseline comfort level: Encourage patient to monitor pain and request assistance     Problem: Safety - Adult  Goal: Free from fall injury  Outcome: Progressing  Flowsheets (Taken 5/15/2025 2353 by Ivory Ramires, RN)  Free From Fall Injury:   Instruct family/caregiver on patient safety   Based on caregiver fall risk screen, instruct family/caregiver to ask for assistance with transferring infant if caregiver noted to have fall risk factors     Problem: Skin/Tissue Integrity - Adult  Goal: Skin integrity remains intact  Outcome: Progressing  Flowsheets  Taken 5/16/2025 1725  Skin Integrity Remains Intact: Monitor for areas of redness and/or skin breakdown  Taken 5/16/2025 0700  Skin Integrity Remains Intact: Monitor for areas of redness and/or skin breakdown     Problem: Metabolic/Fluid and Electrolytes - Adult  Goal: Hemodynamic  stability and optimal renal function maintained  Outcome: Progressing  Flowsheets  Taken 5/16/2025 1725  Hemodynamic stability and optimal renal function maintained: Monitor labs and assess for signs and symptoms of volume excess or deficit  Taken 5/16/2025 0700  Hemodynamic stability and optimal renal function maintained: Monitor labs and assess for signs and symptoms of volume excess or deficit

## 2025-05-16 NOTE — CARE COORDINATION
Met with Farhad, he states he had not done HD in several days due to having GI issues and doctor's appointments. He states he has all equipment needed at home for dialysis. Electronically signed by Anupam Bhakta RN on 5/16/2025 at 2:22 PM

## 2025-05-16 NOTE — CONSULTS
[facility]          [address]        [address]        [place], [place] [address] [phone]           Inpatient Initial consult note         5/16/2025 9:03 AM      Patient Name:   Farhad Myles  YOB: 1960  Primary Care Physician:  Dulce Maria Crespo MD   Admit Date:    5/15/2025  6:56 PM    Consultation requested by : Johnny Guerrero MD    Reason for Consult : Nephrology following for ESRD on him hemodialysis.    History of presenting illness :Farhad Myles is a 64 y.o.   male with Past Medical History as stated below presented with a chief complaint of Fatigue   on 5/15/2025 .    Patient is a home hemodialysis patient who is extremely noncompliant has not has dialysis in almost close to a week he is feeling weak and tired and fatigue and abdominal distention and he apparently has gained some weight to so he presented to the hospital for needing dialysis.    He is currently not in any acute    Past History:  Past Medical History:   Diagnosis Date    Admission for dialysis 9/1/2024    Arthritis     Back problem     back pain-sees [facility] pain mgmt    Bladder disease     Dr. Allison    CHF with unknown LVEF (HCC) 05/24/2021    Dr. George/CHF clinic    Chronic kidney disease     Constipation     Coronary artery disease involving native coronary artery of native heart with angina pectoris 5/21/2024    History of diabetes mellitus     resolved with weight loss    Hypertension     Hypertensive emergency 04/11/2019    Hypertensive urgency 04/13/2019    Sleep apnea     has bipap-sees AZRA Olmedo CNP    Thyroid disease      Past Surgical History:   Procedure Laterality Date    ABDOMEN SURGERY      ABDOMEN SURGERY N/A 3/25/2022    ABDOMINAL LYSIS OF ADHESIONS, EXPLANT OF INFECTED MESH performed by Patrick Bond MD at [facility] OR    APPENDECTOMY      CARDIAC CATHETERIZATION      no stents    CARDIAC PROCEDURE Bilateral 5/13/2024    Left heart cath / coronary angiography performed  extremely noncompliant  Electrolytes patient is in positive fluid balance  Getting the patient dialyzed today.  Will need an additional dialysis treatment tomorrow as well  Electrolytes -electrolytes within normal limits  Essential hypertension stable  Fluid overload plan as mentioned above  Diabetes mellitus  Meds reviewed and discussed with patient    Denilson Prabhakar MD  [facility]    This report has been created using voice recognition software. It may contain minor errors which are inherent in voice recognition technology

## 2025-05-16 NOTE — ED NOTES
ED to inpatient nurses report      Chief Complaint:  Chief Complaint   Patient presents with    Fatigue     Present to ED from: home    MOA:     LOC: alert and orientated to name, place, date  Mobility: Requires assistance * 1  Oxygen Baseline: 4L O2 via NC    Current needs required: 4L O2 via NC     Code Status:   Full Code    What abnormal results were found and what did you give/do to treat them? See chart and labs  Any procedures or intervention occur? none    Mental Status:  Level of Consciousness: Alert (0)    Psych Assessment:        Vitals:  Patient Vitals for the past 24 hrs:   BP Temp Temp src Pulse Resp SpO2 Weight   05/15/25 1959 (!) 148/74 -- -- 76 18 92 % --   05/15/25 1903 -- 99.1 °F (37.3 °C) Oral 83 20 92 % (!) 138.3 kg (305 lb)        LDAs:   Peripheral IV 05/15/25 Posterior;Right Wrist (Active)   Site Assessment Clean, dry & intact 05/15/25 1915   Line Status Normal saline locked;Blood return noted;Specimen collected;Flushed 05/15/25 1915   Phlebitis Assessment No symptoms 05/15/25 1915   Infiltration Assessment 0 05/15/25 1915   Alcohol Cap Used Yes 05/15/25 1915   Dressing Status Clean, dry & intact 05/15/25 1915   Dressing Type Transparent 05/15/25 1915       Ambulatory Status:  No data recorded    Diagnosis:  DISPOSITION Admitted 05/15/2025 08:35:44 PM   Final diagnoses:   Hypervolemia, unspecified hypervolemia type   Dependence on renal dialysis        Consults:  IP CONSULT TO NEPHROLOGY     Pain Score:  Pain Assessment  Pain Assessment: None - Denies Pain    C-SSRS:        Sepsis Screening:       Rossana Fall Risk:       Swallow Screening        Preferred Language:   English      ALLERGIES     Azithromycin, Shellfish-derived products, Pcn [penicillins], Succinylcholine, Sulfa antibiotics, Morphine, and Tape [adhesive tape]    SURGICAL HISTORY       Past Surgical History:   Procedure Laterality Date    ABDOMEN SURGERY      ABDOMEN SURGERY N/A 3/25/2022    ABDOMINAL LYSIS OF ADHESIONS, EXPLANT

## 2025-05-16 NOTE — H&P
Hospitalist History & Physical         Patient: Farhad Myles 64 y.o. male      : 1960  Date of Admission: 5/15/2025  Date of Service: Pt seen/examined on 25 and Admitted to Observation with expected LOS less than two midnights due to medical therapy.         ASSESSMENT AND PLAN    ESRD on hemodialysis M,T,R,F in fluid overload  Creatinine 7.8 in admission   Has not had dialysis since     Nephrology consulted   Bipap overnight   Continue Renvela  Daily weights, strict I and O's   Monito electrolytes, renal restriction diet low phos, salt, potassium     Chronic CHFpEF:   2024 Echo: EF 50%  CXR perihilar markings suggesting pulmonary edema - clinically supported   BNP ~25081  Continue Bumex  Daily weight, I&O  Low Na Diet, 2L fluid restriction + renal diet restrictions      Chronic respiratory failure/restrictive airway disease  Baseline home O2 3-4LNC.   Continue home O2 to maintain SpO2 >90%  Continue Singulair, PRN albuterol     CAD   S/p PCI to LAD x 2 2024.   Follows with Dr. George with cardiology as OP.   Continue DAPT, statin and PRN nitro      NIDDMII  24 A1c 4.4.   Hold PO meds  Low sliding scale insulin  Accucheks   Hypoglycemia protocol        Primary HTN  Continue amlodipine, carvedilol       LAURIE and OHS on Bipap:  Continue Bipap at HS     Hypothyroidism:   Continue levothyroxine     Chronic pain syndrome   PRN Percocet, lidocaine patches      Action tremor:   Follows with Dr. Garcia with neurology as OP.   Continue primidone, Requip     MDD and SHRUTHI  Continue Zoloft, effexor     BPH    Insomnia   Remeron, melatonin      Obesity  BMI 44.56      Data reviewed (unless otherwise discussed in assessment/plan)  EKG:  I have reviewed the EKG with the following interpretation: NSR with RBBB  Imaging: I have reviewed  CXR- see above    Labs: Reviewed, see chart and plan above.  Humiliation, Afraid, Rape, and Kick questionnaire     Fear of Current or Ex-Partner: No     Emotionally Abused: No     Physically Abused: No     Sexually Abused: No   Housing Stability: Low Risk  (5/15/2025)    Housing Stability Vital Sign     Unable to Pay for Housing in the Last Year: No     Number of Times Moved in the Last Year: 0     Homeless in the Last Year: No        Code status: Full Code     Thank you Dulce Maria Crespo MD for the opportunity to be involved in this patient's care.    Electronically signed by SAMANTHA Langford on 5/16/2025 at 4:08 AM.

## 2025-05-16 NOTE — ED NOTES
Spoke to Charge RN, pt transported to 6K-09 in stable condition. Staff notified. Call light in reach, no further complaints at this time.

## 2025-05-17 VITALS
SYSTOLIC BLOOD PRESSURE: 144 MMHG | DIASTOLIC BLOOD PRESSURE: 74 MMHG | RESPIRATION RATE: 16 BRPM | WEIGHT: 312.39 LBS | TEMPERATURE: 98.1 F | OXYGEN SATURATION: 98 % | BODY MASS INDEX: 41.4 KG/M2 | HEART RATE: 80 BPM | HEIGHT: 73 IN

## 2025-05-17 LAB
ANION GAP SERPL CALC-SCNC: 12 MEQ/L (ref 8–16)
BUN SERPL-MCNC: 39 MG/DL (ref 8–23)
CALCIUM SERPL-MCNC: 8.6 MG/DL (ref 8.8–10.2)
CHLORIDE SERPL-SCNC: 100 MEQ/L (ref 98–111)
CO2 SERPL-SCNC: 24 MEQ/L (ref 22–29)
CREAT SERPL-MCNC: 5.2 MG/DL (ref 0.7–1.2)
DEPRECATED RDW RBC AUTO: 52.9 FL (ref 35–45)
ERYTHROCYTE [DISTWIDTH] IN BLOOD BY AUTOMATED COUNT: 14.8 % (ref 11.5–14.5)
GFR SERPL CREATININE-BSD FRML MDRD: 12 ML/MIN/1.73M2
GLUCOSE SERPL-MCNC: 79 MG/DL (ref 74–109)
HCT VFR BLD AUTO: 26.4 % (ref 42–52)
HGB BLD-MCNC: 8.6 GM/DL (ref 14–18)
MCH RBC QN AUTO: 33.2 PG (ref 26–33)
MCHC RBC AUTO-ENTMCNC: 32.6 GM/DL (ref 32.2–35.5)
MCV RBC AUTO: 101.9 FL (ref 80–94)
PLATELET # BLD AUTO: 158 THOU/MM3 (ref 130–400)
PMV BLD AUTO: 8.9 FL (ref 9.4–12.4)
POTASSIUM SERPL-SCNC: 4.3 MEQ/L (ref 3.5–5.2)
RBC # BLD AUTO: 2.59 MILL/MM3 (ref 4.7–6.1)
SODIUM SERPL-SCNC: 136 MEQ/L (ref 135–145)
WBC # BLD AUTO: 6.4 THOU/MM3 (ref 4.8–10.8)

## 2025-05-17 PROCEDURE — G0378 HOSPITAL OBSERVATION PER HR: HCPCS

## 2025-05-17 PROCEDURE — 90935 HEMODIALYSIS ONE EVALUATION: CPT

## 2025-05-17 PROCEDURE — 6370000000 HC RX 637 (ALT 250 FOR IP): Performed by: PHYSICIAN ASSISTANT

## 2025-05-17 PROCEDURE — 99239 HOSP IP/OBS DSCHRG MGMT >30: CPT | Performed by: STUDENT IN AN ORGANIZED HEALTH CARE EDUCATION/TRAINING PROGRAM

## 2025-05-17 PROCEDURE — 85027 COMPLETE CBC AUTOMATED: CPT

## 2025-05-17 PROCEDURE — 90935 HEMODIALYSIS ONE EVALUATION: CPT | Performed by: INTERNAL MEDICINE

## 2025-05-17 PROCEDURE — G0257 UNSCHED DIALYSIS ESRD PT HOS: HCPCS

## 2025-05-17 PROCEDURE — 6370000000 HC RX 637 (ALT 250 FOR IP)

## 2025-05-17 PROCEDURE — 36415 COLL VENOUS BLD VENIPUNCTURE: CPT

## 2025-05-17 PROCEDURE — 96372 THER/PROPH/DIAG INJ SC/IM: CPT

## 2025-05-17 PROCEDURE — 2500000003 HC RX 250 WO HCPCS: Performed by: PHYSICIAN ASSISTANT

## 2025-05-17 PROCEDURE — 80048 BASIC METABOLIC PNL TOTAL CA: CPT

## 2025-05-17 PROCEDURE — 6360000002 HC RX W HCPCS: Performed by: PHYSICIAN ASSISTANT

## 2025-05-17 RX ADMIN — SEVELAMER CARBONATE 800 MG: 800 TABLET, FILM COATED ORAL at 12:38

## 2025-05-17 RX ADMIN — HEPARIN SODIUM 5000 UNITS: 5000 INJECTION INTRAVENOUS; SUBCUTANEOUS at 05:30

## 2025-05-17 RX ADMIN — ASPIRIN 81 MG: 81 TABLET, COATED ORAL at 12:37

## 2025-05-17 RX ADMIN — TAMSULOSIN HYDROCHLORIDE 0.4 MG: 0.4 CAPSULE ORAL at 12:38

## 2025-05-17 RX ADMIN — AMLODIPINE BESYLATE 10 MG: 10 TABLET ORAL at 12:37

## 2025-05-17 RX ADMIN — CLOPIDOGREL BISULFATE 75 MG: 75 TABLET, FILM COATED ORAL at 12:37

## 2025-05-17 RX ADMIN — VENLAFAXINE HYDROCHLORIDE 150 MG: 150 CAPSULE, EXTENDED RELEASE ORAL at 12:38

## 2025-05-17 RX ADMIN — BUMETANIDE 2 MG: 1 TABLET ORAL at 12:37

## 2025-05-17 RX ADMIN — LEVOTHYROXINE SODIUM 200 MCG: 0.1 TABLET ORAL at 05:30

## 2025-05-17 RX ADMIN — PANTOPRAZOLE SODIUM 40 MG: 40 TABLET, DELAYED RELEASE ORAL at 05:30

## 2025-05-17 RX ADMIN — OXYCODONE AND ACETAMINOPHEN 1 TABLET: 10; 325 TABLET ORAL at 12:42

## 2025-05-17 RX ADMIN — OXYCODONE AND ACETAMINOPHEN 1 TABLET: 10; 325 TABLET ORAL at 03:36

## 2025-05-17 RX ADMIN — SODIUM CHLORIDE, PRESERVATIVE FREE 10 ML: 5 INJECTION INTRAVENOUS at 12:38

## 2025-05-17 RX ADMIN — PRIMIDONE 50 MG: 50 TABLET ORAL at 12:38

## 2025-05-17 RX ADMIN — CARVEDILOL 25 MG: 25 TABLET, FILM COATED ORAL at 12:37

## 2025-05-17 RX ADMIN — ATORVASTATIN CALCIUM 40 MG: 40 TABLET, FILM COATED ORAL at 12:37

## 2025-05-17 ASSESSMENT — PAIN DESCRIPTION - DESCRIPTORS
DESCRIPTORS: ACHING
DESCRIPTORS: THROBBING

## 2025-05-17 ASSESSMENT — PAIN SCALES - GENERAL
PAINLEVEL_OUTOF10: 8
PAINLEVEL_OUTOF10: 9
PAINLEVEL_OUTOF10: 5

## 2025-05-17 ASSESSMENT — PAIN - FUNCTIONAL ASSESSMENT
PAIN_FUNCTIONAL_ASSESSMENT: PREVENTS OR INTERFERES SOME ACTIVE ACTIVITIES AND ADLS
PAIN_FUNCTIONAL_ASSESSMENT: ACTIVITIES ARE NOT PREVENTED

## 2025-05-17 ASSESSMENT — PAIN SCALES - WONG BAKER
WONGBAKER_NUMERICALRESPONSE: NO HURT
WONGBAKER_NUMERICALRESPONSE: NO HURT

## 2025-05-17 ASSESSMENT — PAIN DESCRIPTION - LOCATION
LOCATION: BACK
LOCATION: ELBOW;SHOULDER

## 2025-05-17 ASSESSMENT — PAIN DESCRIPTION - ORIENTATION
ORIENTATION: LEFT
ORIENTATION: MID

## 2025-05-17 NOTE — PLAN OF CARE
Problem: Chronic Conditions and Co-morbidities  Goal: Patient's chronic conditions and co-morbidity symptoms are monitored and maintained or improved  5/16/2025 2200 by Svetlana Hutchison, RN  Outcome: Progressing  Flowsheets (Taken 5/16/2025 2200)  Care Plan - Patient's Chronic Conditions and Co-Morbidity Symptoms are Monitored and Maintained or Improved:   Monitor and assess patient's chronic conditions and comorbid symptoms for stability, deterioration, or improvement   Collaborate with multidisciplinary team to address chronic and comorbid conditions and prevent exacerbation or deterioration   Update acute care plan with appropriate goals if chronic or comorbid symptoms are exacerbated and prevent overall improvement and discharge     Problem: Discharge Planning  Goal: Discharge to home or other facility with appropriate resources  5/16/2025 2200 by Svetlana Hutchison, RN  Outcome: Progressing  Flowsheets (Taken 5/16/2025 2200)  Discharge to home or other facility with appropriate resources:   Identify barriers to discharge with patient and caregiver   Arrange for needed discharge resources and transportation as appropriate   Identify discharge learning needs (meds, wound care, etc)     Problem: Pain  Goal: Verbalizes/displays adequate comfort level or baseline comfort level  5/16/2025 2200 by Svetlana Hutchison, RN  Outcome: Progressing  Flowsheets (Taken 5/16/2025 2200)  Verbalizes/displays adequate comfort level or baseline comfort level:   Encourage patient to monitor pain and request assistance   Assess pain using appropriate pain scale   Administer analgesics based on type and severity of pain and evaluate response   Implement non-pharmacological measures as appropriate and evaluate response     Problem: Safety - Adult  Goal: Free from fall injury  5/16/2025 2200 by Svetlana Hutchison, RN  Outcome: Progressing  Flowsheets (Taken 5/16/2025 2200)  Free From Fall Injury:   Instruct family/caregiver on patient safety

## 2025-05-17 NOTE — TELEPHONE ENCOUNTER
ED follow up    As above.  85 yo male with advanced dementia with behavioral disturbances, mid FAST stage 6 by history, hx of multiple hospitalizations for worsening agitation, now admitted for AHRF 2/2 aspiration pneumonia, colitis, and Orlin, with evidence of progressive dysphagia.  Substantially increased risk of further morbidity and mortality over the next 12 months, low threshold for hospice in the near future.      See GOC above--son not receptive to having full GOC discussion today, deferred to 5/19 per his request.  Son would benefit from additional anticipatory guidance, counseling regarding expected disease trajectory, etc.    Otherwise continue management as per primary medical team  Patient to remain Full Code for now  Discussed with medical team and Dr. Kelsey in detail  Palliative Care team will continue to follow

## 2025-05-17 NOTE — FLOWSHEET NOTE
Stable 4 hour TX completed. Removed 4 L of fluid, tolerated well. Pressure held to each needle site x10 min. Drsg clean, dry, and intact upon leaving unit. TX record printed for scanning into EMR. Report called to primary RN.      05/17/25 0767 05/17/25 1150   Vital Signs   BP (!) 162/83 (!) 164/87   Temp 97.5 °F (36.4 °C) 98 °F (36.7 °C)   Pulse 62 62   Respirations 16 16   SpO2 97 % 98 %   Weight - Scale (!) 145.7 kg (321 lb 3.4 oz) (!) 141.7 kg (312 lb 6.3 oz)   Weight Method Standing scale  --    Percent Weight Change -0.03 -2.77   Post-Hemodialysis Assessment   Post-Treatment Procedures  --  Blood returned;Access bleeding time < 10 minutes   Machine Disinfection Process  --  Acid/Vinegar Clean;Heat Disinfect;Exterior Machine Disinfection   Blood Volume Processed (Liters)  --  80.6 L   Dialyzer Clearance  --  Lightly streaked   Duration of Treatment (minutes)  --  240 minutes   Heparin Amount Administered During Treatment (mL)  --  0 mL   Hemodialysis Intake (ml)  --  400 ml   Hemodialysis Output (ml)  --  4400 ml   NET Removed (ml)  --  4000   Tolerated Treatment  --  Good

## 2025-05-17 NOTE — PROGRESS NOTES
[facility]   Nephrology progress note  5/17/2025, 11:18 AM      Pt Name:    Farhad Myles  MRN:     [id number]     YOB: 1960  Admit Date:    5/15/2025  6:56 PM    Chief Complaint: Nephrology following for ESRD.    Subjective:  Patient was seen and examined this morning during hemodialysis.   Bp 167/94, UF 4 liters.   He is feeling better.    Objective:  24HR INTAKE/OUTPUT:    Intake/Output Summary (Last 24 hours) at 5/17/2025 1118  Last data filed at 5/16/2025 1713  Gross per 24 hour   Intake 520 ml   Output 5481 ml   Net -4961 ml         I/O last 3 completed shifts:  In: 520 [P.O.:120]  Out: 5481 [Urine:500]  No intake/output data recorded.   Admission weight: (!) 138.3 kg (305 lb)  Wt Readings from Last 3 Encounters:   05/17/25 (!) 145.7 kg (321 lb 4.8 oz)   05/13/25 (!) 138 kg (304 lb 3.8 oz)   05/01/25 (!) 138 kg (304 lb 3.8 oz)        Vitals :   Vitals:    05/17/25 0406 05/17/25 0426 05/17/25 0607 05/17/25 0730   BP:  135/74  (!) 162/83   Pulse:  60  62   Resp: 16 16  16   Temp:  97.8 °F (36.6 °C)  97.5 °F (36.4 °C)   TempSrc:  Axillary     SpO2:  100%  97%   Weight:   (!) 145.7 kg (321 lb 4.8 oz)    Height:           Physical examination  General Appearance: alert and cooperative with exam, appears comfortable, no distress  Mouth/Throat: Oral mucosa moist  Neck: No JVD  Lungs: diminished  Heart:  S1, S2 heard  GI: soft, non-tender, no guarding  Extremities: edema noted    Medications:  Infusion:    dextrose      sodium chloride       Meds:    bumetanide  2 mg Oral Daily    mirtazapine  15 mg Oral Nightly    sodium chloride flush  10 mL IntraVENous 2 times per day    heparin (porcine)  5,000 Units SubCUTAneous 3 times per day    amLODIPine  10 mg Oral Daily    aspirin EC  81 mg Oral Daily    atorvastatin  40 mg Oral Daily    carvedilol  25 mg Oral BID WC    clopidogrel  75 mg Oral Daily    levothyroxine  200 mcg Oral Daily    lidocaine  2 patch TransDERmal Daily

## 2025-05-17 NOTE — DISCHARGE SUMMARY
Hospital Medicine Discharge Summary      Patient Identification:   Farhad Myles   : 1960  MRN: [id number]   Account: [id number]      Patient's PCP: Dulce Maria Crespo MD    Admit Date: 5/15/2025     Discharge Date:   25    Admitting Physician: No admitting provider for patient encounter.     Discharge Physician: Johnny Guerrero MD     Discharge Diagnoses:  Volume overload secondary to missed HD  ESRD on HD at home, 4 times weekly  Suspected acute HF but low suspicion, symptoms likely more related to missed HD    The patient was seen and examined on day of discharge and this discharge summary is in conjunction with any daily progress note from day of discharge.    Hospital Course:   Farhad Myles is a 64 y.o. male admitted to [facility] on 5/15/2025 for volume overload due to 2 missed sessions of HD at home. S/p HD x 2 inpatient. Symptoms of dyspnea improved. No concerns for any other acute issue. Discharged home in stable condition with Nephrology team's approval.   Of note, there was some concern about his mood. Depression screening negative. Endorsed intermittent low mood but stressed about a recent incident per patient. No suicidal thoughts currently.     Exam:     Vitals:  Vitals:    25 0730 25 1150 25 1237 25 1312   BP: (!) 162/83 (!) 164/87 (!) 144/74    Pulse: 62 62 80    Resp: 16 16 16 16   Temp: 97.5 °F (36.4 °C) 98 °F (36.7 °C) 98.1 °F (36.7 °C)    TempSrc:   Oral    SpO2: 97% 98% 98%    Weight: (!) 145.7 kg (321 lb 3.4 oz) (!) 141.7 kg (312 lb 6.3 oz)     Height:         Weight: Weight - Scale: (!) 141.7 kg (312 lb 6.3 oz)     24 hour intake/output:  Intake/Output Summary (Last 24 hours) at 2025 1341  Last data filed at 2025 1150  Gross per 24 hour   Intake 520 ml   Output 4400 ml   Net -3880 ml         General appearance:  No apparent distress, appears stated age and cooperative.  HEENT:  Normal cephalic, atraumatic without obvious  solution  Commonly known as: DUONEB  INHALE 3 ML INTO THE LUNGS EVERY 4 HOURS AS NEEDED FOR SHORTNESS OF BREATH     Knee Brace Misc  Bilateral knee brace. Wear prn for pain and support     levothyroxine 200 MCG tablet  Commonly known as: SYNTHROID  Take 1 tablet by mouth daily     lidocaine 4 % external patch  Place 2 patches onto the skin daily     melatonin 3 MG Tabs tablet  TAKE 2 TABLETS EVERY NIGHT AS NEEDED FOR SLEEP     MIRCERA IJ     mirtazapine 7.5 MG tablet  Commonly known as: REMERON  TAKE 1 TABLET BY MOUTH EVERY DAY AT NIGHT     montelukast 10 MG tablet  Commonly known as: SINGULAIR  TAKE 1 TABLET EVERY NIGHT     nitroGLYCERIN 0.4 MG SL tablet  Commonly known as: Nitrostat  Place 1 tablet under the tongue every 5 minutes as needed for Chest pain (up to 3 doses)     oxyCODONE-acetaminophen  MG per tablet  Commonly known as: Percocet  Take 1 tablet by mouth every 8 hours as needed for Pain for up to 30 days. Intended supply: 30 days Max Daily Amount: 3 tablets     pantoprazole 20 MG tablet  Commonly known as: PROTONIX  TAKE 1 TABLET EVERY MORNING BEFORE BREAKFAST     primidone 50 MG tablet  Commonly known as: MYSOLINE  TAKE 2 TABLETS IN THE MORNING AND 1 TABLET IN EVENING (3 TABLETS TOTAL PER DAY)     rOPINIRole 0.5 MG tablet  Commonly known as: REQUIP  2 tablets at nighttime     sevelamer 800 MG tablet  Commonly known as: RENVELA     tamsulosin 0.4 MG capsule  Commonly known as: FLOMAX  TAKE 1 CAPSULE BY MOUTH IN THE MORNING AND 1 CAPSULE IN THE EVENING.     venlafaxine 150 MG extended release capsule  Commonly known as: Effexor XR  Take 1 capsule by mouth daily     VITAMIN D PO           * This list has 3 medication(s) that are the same as other medications prescribed for you. Read the directions carefully, and ask your doctor or other care provider to review them with you.                STOP taking these medications      glimepiride 4 MG tablet  Commonly known as: AMARYL

## 2025-05-19 ENCOUNTER — ENROLLMENT (OUTPATIENT)
Dept: CARE COORDINATION | Age: 65
End: 2025-05-19

## 2025-05-19 ENCOUNTER — CARE COORDINATION (OUTPATIENT)
Dept: CARE COORDINATION | Age: 65
End: 2025-05-19

## 2025-05-19 DIAGNOSIS — Z99.2 ESRD NEEDING DIALYSIS (HCC): Primary | ICD-10-CM

## 2025-05-19 DIAGNOSIS — N18.6 ESRD NEEDING DIALYSIS (HCC): Primary | ICD-10-CM

## 2025-05-19 PROCEDURE — 1111F DSCHRG MED/CURRENT MED MERGE: CPT | Performed by: INTERNAL MEDICINE

## 2025-05-19 NOTE — CARE COORDINATION
Care Transition Nurse provided contact information.  Plan for follow-up call in 2-5 days based on severity of symptoms and risk factors.  Plan for next call: symptom management-abdominal distention, bloating, SOB, fatigue, CP, weakness, BP, pulse, weight, swelling, any new or worsening symptoms, review nephrology appt, any new or changed meds? Did he reschedule palliative care and PCP appts?      Tavia Roach RN

## 2025-05-20 ENCOUNTER — TELEPHONE (OUTPATIENT)
Dept: INTERNAL MEDICINE CLINIC | Age: 65
End: 2025-05-20

## 2025-05-20 NOTE — TELEPHONE ENCOUNTER
Have the medications prescribed at discharge been filled? N/A  Does the patient understand what the medications are for? Yes  Has the patient experienced any new symptoms or have previous symptoms worsened? No  Is the patient experiencing any pain? Yes If yes, is the pain well controlled? Yes  We want to be sure the patient has the best recovery possible. Does the patient understand all the discharge instructions? Yes  Did someone talk to the patient and/or family about the patient needs prior to being discharged? Yes  Did the patient's doctor communicate well? Yes  Did the patient's nurse communicate well? Yes  Was the patient satisfied with the services and care received at [facility]? Yes

## 2025-05-20 NOTE — TELEPHONE ENCOUNTER
Care Transitions Initial Follow Up Call    Call within 2 business days of discharge: Yes     Patient: Farhad Myles Patient : 1960 MRN: [id number]    [unfilled]    RARS: Readmission Risk Score: 22.8       Spoke with: Farhad Myles    Discharge department/facility: [facility]     Non-face-to-face services provided: Transition of care questions       Follow Up  Future Appointments   Date Time Provider Department Center   2025 10:00 AM Gregory, Ashlie, OT [facility]   2025  8:00 AM Gregory, Ashlie, OT [facility]   2025  2:00 PM Gregory, Ashlie, OT [facility]   2025  9:00 AM Gregory, Ashlie, OT [facility]   2025  1:00 PM Joel Holcomb APRN - CNP [facility]   2025  2:00 PM Aaliyah Gonzales MD [facility]   2025 11:30 AM Dulce Maria Crespo MD [facility] Physic [facility] DEP   2025  2:40 PM Tiff Pierre APRN - CNP [facility] SLE [facility] [place]   2025  3:00 PM Tiff Pierre APRN - CNP N Pulm Med [facility]   10/9/2025  2:30 PM Cally Yang APRN - CNP N [facility] Del U [facility]   2025  2:45 PM Marco George MD N [facility] Heart [facility] [place]       DELILAH VERA LPN

## 2025-05-22 ENCOUNTER — CARE COORDINATION (OUTPATIENT)
Dept: CARE COORDINATION | Age: 65
End: 2025-05-22

## 2025-05-22 NOTE — CARE COORDINATION
Care Transitions Note    Follow Up Call     Attempted to reach patient for transitions of care follow up.  Unable to reach patient.      Outreach Attempts:   HIPAA compliant voicemail left for patient.     Care Summary Note: Attempted to contact pt for care transition follow up.  Unable to reach pt.  Left message with contact information and request for call back.      Follow Up Appointment:   Future Appointments         Provider Specialty Dept Phone    5/28/2025 10:00 AM Ashlie Jenkins, OT Physical Therapy [phone]    7/1/2025 8:00 AM Ashlie Jenkins, OT Physical Therapy [phone]    7/7/2025 2:00 PM Ashlie Jenkins, OT Physical Therapy [phone]    7/14/2025 9:00 AM Ashlie Jenkins, OT Physical Therapy [phone]    7/14/2025 1:00 PM Joel Holcomb APRN - CNP Physical Medicine and Rehab [phone]    7/16/2025 2:00 PM Aaliyah Gonzales MD Rheumatology [phone]    7/31/2025 11:30 AM Dulce Maria Crespo MD Internal Medicine [phone]    7/31/2025 2:40 PM Tiff Pierre APRRANGEL [facility] [phone]    9/8/2025 3:00 PM Tiff Pierre APRN [facility] [phone]    10/9/2025 2:30 PM Cally Yang APRN - CNP Urology [phone]    12/4/2025 2:45 PM Marco George MD Cardiology [phone]            Plan for follow-up call in 6-10 days based on severity of symptoms and risk factors. Plan for next call:  symptom management-abdominal distention, bloating, SOB, fatigue, CP, weakness, BP, pulse, weight, swelling, any new or worsening symptoms, review nephrology appt, any new or changed meds? Did he reschedule palliative care and PCP appts?      Tavia Roach RN

## 2025-05-23 ENCOUNTER — CARE COORDINATION (OUTPATIENT)
Dept: CARE COORDINATION | Age: 65
End: 2025-05-23

## 2025-05-23 NOTE — CARE COORDINATION
Care Transitions Note    Follow Up Call     Patient Current Location:  Home: [address] [place] [phone]    Care Transition Nurse contacted the patient by telephone. Verified name and  as identifiers.    Additional needs identified to be addressed with provider   No needs identified                 Method of communication with provider: none.    Care Summary Note: Contacted pt for care transition follow up.  Farhad states he is doing okay.  Reports he missed his tele-visit appt with nephrology because he slept through it.  He is waiting on nephrology to call him to reschedule the appt.  Pt also aware he will need to reschedule his appt with PCP and palliative care.  States he plans to call next week.  Pt reports abdominal distention and bloating is not as bad as it was when going to the hospital.  Shortness of breath is at baseline.  Using 4 L of continuous oxygen.  Denies having any chest pain/discomfort.  Pt continues to monitor his vital signs closely.  /77 with pulse 85 while standing; /63 with pulse 79 while sitting; weight 142.2 kg and dry weight 139.5 kg.  Reports some swelling in his ankles.  Continues to keep his legs elevated while sitting down.  He is aware of when to contact providers as well as when to seek medical attention.  No questions or needs at this time.      Plan of care updates since last contact:  Review of patient management of conditions/medications: ESRD on dialysis       Advance Care Planning:   Does patient have an Advance Directive: reviewed during previous call, see note. .    Medication Review:  No changes since last call.     Remote Patient Monitoring:  Offered patient enrollment in the Remote Patient Monitoring (RPM) program for in-home monitoring: Yes, but did not enroll at this time: already monitoring with home equipment.    Assessments:  Care Transitions Subsequent and Final Call    Subsequent and Final Calls  Do you have any ongoing symptoms?:

## 2025-05-28 ENCOUNTER — HOSPITAL ENCOUNTER (OUTPATIENT)
Dept: PHYSICAL THERAPY | Age: 65
Setting detail: THERAPIES SERIES
Discharge: HOME | End: 2025-05-28
Payer: MEDICARE

## 2025-05-28 DIAGNOSIS — M25.561 CHRONIC PAIN OF BOTH KNEES: ICD-10-CM

## 2025-05-28 DIAGNOSIS — M25.562 CHRONIC PAIN OF BOTH KNEES: ICD-10-CM

## 2025-05-28 DIAGNOSIS — M48.062 SPINAL STENOSIS OF LUMBAR REGION WITH NEUROGENIC CLAUDICATION: ICD-10-CM

## 2025-05-28 DIAGNOSIS — M17.0 PRIMARY OSTEOARTHRITIS OF BOTH KNEES: ICD-10-CM

## 2025-05-28 DIAGNOSIS — M47.816 SPONDYLOSIS OF LUMBAR REGION WITHOUT MYELOPATHY OR RADICULOPATHY: ICD-10-CM

## 2025-05-28 DIAGNOSIS — G62.9 NEUROPATHY: ICD-10-CM

## 2025-05-28 DIAGNOSIS — G89.29 CHRONIC PAIN OF BOTH KNEES: ICD-10-CM

## 2025-05-28 DIAGNOSIS — M47.816 LUMBAR FACET ARTHROPATHY: ICD-10-CM

## 2025-05-28 DIAGNOSIS — G89.4 CHRONIC PAIN SYNDROME: ICD-10-CM

## 2025-05-28 PROCEDURE — 97530 THERAPEUTIC ACTIVITIES: CPT

## 2025-05-28 PROCEDURE — 97110 THERAPEUTIC EXERCISES: CPT

## 2025-05-28 RX ORDER — OXYCODONE AND ACETAMINOPHEN 10; 325 MG/1; MG/1
1 TABLET ORAL EVERY 4 HOURS PRN
Qty: 180 TABLET | Refills: 0 | Status: SHIPPED | OUTPATIENT
Start: 2025-05-28 | End: 2025-06-27

## 2025-05-28 NOTE — PROGRESS NOTES
97110-Therapeutic Exercise,  97140-Manual Therapy, 97530-Therapeutic Activities, 40084-SO ADL Training, 64597-Sjrtxnfvxpmka Re-Education, 97035-Ultrasound, and 97014-Electrical Stimulation Unattended   Progress Note Counter 2/10 for progress note (Reporting Period: 4/24/25 to present)   Recertification Date 07/17/25   Physician Follow-Up October 2025    Physician Orders Eval and treat   History of Present Illness Pt reports to skilled OT services with c/o pain and bleeding with urination that started a year ago. Pt reports that he will have intermittent and hesitancy with urination. Pt reports that he will have occasional urinary urgency and urge incontinence. Pt reports that he has stage 5 kidney failure. Pt reports that he has chronic constipation that he will not go and then will have diarrhea that he has been dealing with for 10 years. Pt reports that he had a colon resection (75% of the colon removed) due to having the mesh from an umbilical hernia surgery. Pt reports that he will have some fecal incontinence after he will have a bm and is followed by liquid and usually cannot tell he has to.      SUBJECTIVE: Pt reports that he has had uncontrollable diarrhea since last visit. Pt reports that he has chronic constipation that will then turn to the diarrhea. Pt reports that he has been doing prune juice that seems to help with his bowels. Pt reports that he will have a bm every couple days. Pt reports no pain or difficulty with his HEP.     Social/Functional History and Current Status:    Farhad Myles lives with spouse in a multiple floor home with ability to complete ADL's on main floor with stairs and a handrail to enter.    Task Previous Current   ADL’s  Independent Independent getting more difficult, toileting    IADL's Independent Independent getting more difficult    Ambulation Independent Modified Independent cane and a 4WW at home   Transfers Independent Independent getting more difficult

## 2025-05-29 ENCOUNTER — CARE COORDINATION (OUTPATIENT)
Dept: CARE COORDINATION | Age: 65
End: 2025-05-29

## 2025-05-29 NOTE — CARE COORDINATION
Care Transitions Note    Follow Up Call     Attempted to reach patient for transitions of care follow up.  Unable to reach patient.      Outreach Attempts:   HIPAA compliant voicemail left for patient.     Care Summary Note: Attempted to contact pt for care transition follow up.  Unable to reach pt.  Left message with contact information and request for call back.      Follow Up Appointment:   Future Appointments         Provider Specialty Dept Phone    6/19/2025 1:00 PM Doug Moreno APRN [facility] Palliative Care [phone]    7/1/2025 8:00 AM Ashlie Jenkins, OT Physical Therapy [phone]    7/7/2025 2:00 PM Ashlie Jenkins, OT Physical Therapy [phone]    7/14/2025 9:00 AM Ashlie Jenkins, OT Physical Therapy [phone]    7/14/2025 1:00 PM Joel Holcomb APRN - CNP Physical Medicine and Rehab [phone]    7/16/2025 2:00 PM Aaliyah Gonzales MD Rheumatology [phone]    7/31/2025 11:30 AM Dulce Maria Crespo MD Internal Medicine [phone]    7/31/2025 2:40 PM Tiff Pierre APRRANGEL [facility] [phone]    8/14/2025 11:00 AM Ashlie Jenkins, OT Physical Therapy [phone]    8/28/2025 11:00 AM Ashlie Jenkins, OT Physical Therapy [phone]    9/8/2025 3:00 PM Tiff Pierre APRN [facility] Pulmonology [phone]    10/9/2025 2:30 PM Cally Yang APRN - CNP Urology [phone]    12/4/2025 2:45 PM Marco George MD Cardiology [phone]            Plan for follow-up call in 2-5 days based on severity of symptoms and risk factors. Plan for next call: symptom management-swelling, weight, BP, pulse, abdominal distention, SOB, any new or worsening symptoms  follow-up appointment-did pt get his appts (nephrology, PCP) rescheduled?       Tavia Roach RN

## 2025-05-30 ENCOUNTER — CARE COORDINATION (OUTPATIENT)
Dept: CARE COORDINATION | Age: 65
End: 2025-05-30

## 2025-05-30 NOTE — CARE COORDINATION
contact information.  Plan for follow-up call in 6-10 days based on severity of symptoms and risk factors.  Plan for next call: symptom management-swelling, SOB, abdominal distention, bowels, any new or worsening symptoms       Tavia Roach RN

## 2025-06-05 ENCOUNTER — CARE COORDINATION (OUTPATIENT)
Dept: CASE MANAGEMENT | Age: 65
End: 2025-06-05

## 2025-06-05 NOTE — CARE COORDINATION
Care Transitions Note    Final Call     Patient Current Location:  Home: [address] [place] [phone]    Care Transition Nurse contacted the patient by telephone. Verified name and  as identifiers.    Patient graduated from the Care Transitions program on 2025.  Patient/family verbalizes confidence in the ability to self-manage at this time..      Advance Care Planning:   Does patient have an Advance Directive: reviewed and current.    Handoff:   Patient was not referred to the ACM team due to no additional needs identified.       Care Summary Note:     CTN LPN contacted the patient for a Care Transition FINAL follow-up and spoke to Farhad Myles denies falls or balance issues,  pain, chest pain,  fever,  chills,  congestion,  cough,  diaphoresis,  fatigue,  weakness,  swelling,  shortness of breath,  rash,  constipation,  nausea, vomiting or diarrhea,  activity or sleep issues,  bowel or urination issues,  urinary tract infection signs and symptoms,  appetite or fluid intake issues,  weight gain or loss,  or any other new symptoms.    Farhad Myles states   - denies shortness of breath, dialysis help, wearing 4 L of oxygen.   - states swelling went away   - Pulse ox, BP not checked recently  - Last BM was yesterday, no complaints, but continues to use prune juice with Miralax   - dialysis treatments are completed 3-4 days a week     FINAL CTN CALLS    Discussed seeking emergency medical attention for new or worsening symptoms, and the patient expressed understanding.   The patient states that they have no other questions or concerns at this time.      Assessments:  Care Transitions 24 Hour Call    Do you have any ongoing symptoms?: No  Do you have all of your prescriptions and are they filled?: Yes  Were you discharged with any Home Care or Post Acute Services: No  Post Acute Services: Home Health (Comment: [facility])  Care Transitions Interventions   Home Care

## 2025-06-17 NOTE — PROGRESS NOTES
Farhad Myles is a 64 y.o. male who presents to the palliative care clinic today for:  Chief Complaint   Patient presents with    Follow-up     Hospital follow up     Fatigue     Patient reports fatigue on dialysis days    Pain     Patient reports lower back pain, bilateral knee, feet pain 7. Patient takes percocet last at 1130 am    Shortness of Breath     Patient reports SOB with exertion and is on 4 L of 02.       HPI:   Farhad Myles is currently being seen in the palliative care clinic for specialized medical care focused on relieving symptoms, supporting complex decision making improving quality of life related to their Heart failure and End-stage renal disease on dialysis. Farhad Myles presents to the palliative care clinic today for follow-up of his hospitalization in May 2025.    Symptoms:  Pain: They report having Back and Bilateral knee pain. The pain is described as aching. They rate their pain as a 7 on scale of 1-10. They describe their pain as constant. They have No Medications for scheduled pain relief. From 8 AM yesterday to 8 AM today, they have used Percocet  mg every 4 hours for as needed pain relief. Their pain medications are working well to control their pain.  Shortness of breath: They report shortness of breath on exertion.  This is about the same as it has always been.  Sleep: Patient reports they are not having issues with sleep.  Fatigue/Drowsiness: Patient reports fatigue. Patient reports drowsiness. Only intermittently if  he is not doing anything  Appetite: Patient denied issues with appetite. Patient reports their appetite is good.  Wt. Loss: Patient's weight is stable.  Reports weight loss of 5-10lbs  Dry Mouth: Patient reports dry mouth. Using hard candy to help with this  Nausea/Vomiting: Patient reports nausea. Patient denies vomiting.  Constipation/Diarrhea: Patient reports constipation. He did have 1 episode of diarrhea this morning. He is unsure why this

## 2025-06-18 DIAGNOSIS — F32.9 MAJOR DEPRESSIVE DISORDER WITH CURRENT ACTIVE EPISODE, UNSPECIFIED DEPRESSION EPISODE SEVERITY, UNSPECIFIED WHETHER RECURRENT: ICD-10-CM

## 2025-06-18 RX ORDER — MIRTAZAPINE 7.5 MG/1
7.5 TABLET, FILM COATED ORAL EVERY EVENING
Qty: 90 TABLET | Refills: 3 | Status: SHIPPED | OUTPATIENT
Start: 2025-06-18

## 2025-06-19 ENCOUNTER — PATIENT MESSAGE (OUTPATIENT)
Dept: PALLATIVE CARE | Age: 65
End: 2025-06-19

## 2025-06-19 ENCOUNTER — OFFICE VISIT (OUTPATIENT)
Dept: PALLATIVE CARE | Age: 65
End: 2025-06-19
Payer: MEDICARE

## 2025-06-19 VITALS
HEART RATE: 84 BPM | WEIGHT: 303 LBS | BODY MASS INDEX: 40.16 KG/M2 | RESPIRATION RATE: 15 BRPM | HEIGHT: 73 IN | OXYGEN SATURATION: 96 % | SYSTOLIC BLOOD PRESSURE: 142 MMHG | DIASTOLIC BLOOD PRESSURE: 76 MMHG

## 2025-06-19 DIAGNOSIS — N18.6 ESRD (END STAGE RENAL DISEASE) ON DIALYSIS (HCC): ICD-10-CM

## 2025-06-19 DIAGNOSIS — G62.9 NEUROPATHY: ICD-10-CM

## 2025-06-19 DIAGNOSIS — R06.02 SHORTNESS OF BREATH: ICD-10-CM

## 2025-06-19 DIAGNOSIS — G89.4 CHRONIC PAIN SYNDROME: ICD-10-CM

## 2025-06-19 DIAGNOSIS — R11.2 NAUSEA AND VOMITING, UNSPECIFIED VOMITING TYPE: ICD-10-CM

## 2025-06-19 DIAGNOSIS — I50.9 CHF (NYHA CLASS III, ACC/AHA STAGE C) (HCC): Primary | ICD-10-CM

## 2025-06-19 DIAGNOSIS — Z99.2 ESRD (END STAGE RENAL DISEASE) ON DIALYSIS (HCC): ICD-10-CM

## 2025-06-19 DIAGNOSIS — Z51.5 PALLIATIVE CARE PATIENT: ICD-10-CM

## 2025-06-19 PROCEDURE — 99214 OFFICE O/P EST MOD 30 MIN: CPT

## 2025-06-19 PROCEDURE — 3017F COLORECTAL CA SCREEN DOC REV: CPT

## 2025-06-19 PROCEDURE — 3078F DIAST BP <80 MM HG: CPT

## 2025-06-19 PROCEDURE — G8417 CALC BMI ABV UP PARAM F/U: HCPCS

## 2025-06-19 PROCEDURE — 3077F SYST BP >= 140 MM HG: CPT

## 2025-06-19 PROCEDURE — 4004F PT TOBACCO SCREEN RCVD TLK: CPT

## 2025-06-19 PROCEDURE — G8427 DOCREV CUR MEDS BY ELIG CLIN: HCPCS

## 2025-06-19 RX ORDER — CALCITRIOL 0.25 UG/1
CAPSULE, LIQUID FILLED ORAL
COMMUNITY
Start: 2025-05-11

## 2025-06-19 RX ORDER — ONDANSETRON 4 MG/1
4 TABLET, FILM COATED ORAL EVERY 12 HOURS PRN
Qty: 60 TABLET | Refills: 0 | Status: SHIPPED | OUTPATIENT
Start: 2025-06-19 | End: 2025-07-19

## 2025-06-19 RX ORDER — SENNOSIDES 8.6 MG
1 TABLET ORAL DAILY PRN
Qty: 120 TABLET | Refills: 0 | Status: SHIPPED | OUTPATIENT
Start: 2025-06-19

## 2025-06-19 NOTE — PATIENT INSTRUCTIONS
Continue current plan of care for acute and chronic conditions per primary and specialist teams  Start zofran 4 mg every 12 hours as needed for nausea and vomiting  Start senna 8.6 mg every day as needed for constipation  Continue Percocet  mg every 4 hours as needed for uncontrolled pain and shortness of breath  Continue Remeron 7.5 mg every night scheduled for worsening insomnia, decreased appetite, and depression  Controlled substances agreement signed  PDMP reviewed  Please call the office if your symptoms worsen or if you were to develop new symptoms  Please call the palliative care office when you need refills

## 2025-06-20 RX ORDER — GABAPENTIN 300 MG/1
300 CAPSULE ORAL NIGHTLY
Qty: 30 CAPSULE | Refills: 1 | Status: SHIPPED | OUTPATIENT
Start: 2025-06-20 | End: 2025-08-19

## 2025-07-01 ENCOUNTER — APPOINTMENT (OUTPATIENT)
Dept: PHYSICAL THERAPY | Age: 65
End: 2025-07-01
Payer: MEDICARE

## 2025-07-07 ENCOUNTER — HOSPITAL ENCOUNTER (OUTPATIENT)
Dept: PHYSICAL THERAPY | Age: 65
Setting detail: THERAPIES SERIES
Discharge: HOME | End: 2025-07-07
Payer: MEDICARE

## 2025-07-07 PROCEDURE — 97110 THERAPEUTIC EXERCISES: CPT

## 2025-07-07 PROCEDURE — 97530 THERAPEUTIC ACTIVITIES: CPT

## 2025-07-07 NOTE — PROGRESS NOTES
[facility]  OCCUPATIONAL THERAPY  OUTPATIENT REHABILITATION - SPECIALIZED THERAPY SERVICES  [] PELVIC HEALTH EVALUATION  [x] DAILY NOTE  [] PROGRESS NOTE [] DISCHARGE NOTE    Date: 2025  Patient Name:  Farhad Myles  : 1960  MRN: [id number]  CSN: [id number]    Referring Practitioner Cally Yang, APRN - CNP [id number]      Diagnosis  Diagnoses       R10.2 (ICD-10-CM) - Pelvic pain in male    R31.0 (ICD-10-CM) - Gross hematuria    R30.0 (ICD-10-CM) - Dysuria           Treatment Diagnosis R10.2 - Pelvic and Perineal Pain  M62.81 - Muscle Weakness (Generalized)  N39.41 - Urge Incontinence  K59.00 - Constipation, Unspecified  R27.8 - Other Lack of Coordination   Date of Evaluation 25   Additional Pertinent History Farhad Myles has a past medical history of Admission for dialysis, Arthritis, Back problem, Bladder disease, CHF with unknown LVEF (HCC), Chronic kidney disease, Constipation, Coronary artery disease involving native coronary artery of native heart with angina pectoris, History of diabetes mellitus, Hypertension, Hypertensive emergency, Hypertensive urgency, Sleep apnea, and Thyroid disease.  he has a past surgical history that includes Appendectomy; knee surgery (Right, ); Carpal tunnel release (Bilateral); Colonoscopy (); hernia repair; pr exploratory laparotomy celiotomy w/wo biopsy spx (N/A, 2018); Dilatation, esophagus; Abdomen surgery; Cardiac catheterization; Abdomen surgery (N/A, 3/25/2022); Dialysis fistula creation (Left, 2022); Circumcision (N/A, 3/2/2023); Cystoscopy (N/A, 3/2/2023); Colonoscopy (N/A, 2023); Upper gastrointestinal endoscopy (N/A, 2023); Cardiac procedure (Bilateral, 2024); and Cardiac procedure (N/A, 2024).     Allergies Allergies   Allergen Reactions    Azithromycin Itching     Hands swell and blister      Shellfish-Derived Products Swelling     Tolerates IV dye without any problems

## 2025-07-08 ENCOUNTER — PATIENT MESSAGE (OUTPATIENT)
Dept: PALLATIVE CARE | Age: 65
End: 2025-07-08

## 2025-07-08 DIAGNOSIS — M25.562 CHRONIC PAIN OF BOTH KNEES: ICD-10-CM

## 2025-07-08 DIAGNOSIS — M47.816 SPONDYLOSIS OF LUMBAR REGION WITHOUT MYELOPATHY OR RADICULOPATHY: ICD-10-CM

## 2025-07-08 DIAGNOSIS — G62.9 NEUROPATHY: ICD-10-CM

## 2025-07-08 DIAGNOSIS — M25.561 CHRONIC PAIN OF BOTH KNEES: ICD-10-CM

## 2025-07-08 DIAGNOSIS — M17.0 PRIMARY OSTEOARTHRITIS OF BOTH KNEES: ICD-10-CM

## 2025-07-08 DIAGNOSIS — M48.062 SPINAL STENOSIS OF LUMBAR REGION WITH NEUROGENIC CLAUDICATION: ICD-10-CM

## 2025-07-08 DIAGNOSIS — G89.4 CHRONIC PAIN SYNDROME: ICD-10-CM

## 2025-07-08 DIAGNOSIS — G89.29 CHRONIC PAIN OF BOTH KNEES: ICD-10-CM

## 2025-07-08 DIAGNOSIS — M47.816 LUMBAR FACET ARTHROPATHY: ICD-10-CM

## 2025-07-08 RX ORDER — OXYCODONE AND ACETAMINOPHEN 10; 325 MG/1; MG/1
1 TABLET ORAL EVERY 4 HOURS PRN
Qty: 180 TABLET | Refills: 0 | Status: SHIPPED | OUTPATIENT
Start: 2025-07-08 | End: 2025-08-07

## 2025-07-14 ENCOUNTER — APPOINTMENT (OUTPATIENT)
Dept: PHYSICAL THERAPY | Age: 65
End: 2025-07-14
Payer: MEDICARE

## 2025-07-21 ENCOUNTER — APPOINTMENT (OUTPATIENT)
Dept: PHYSICAL THERAPY | Age: 65
End: 2025-07-21
Payer: MEDICARE

## 2025-07-21 RX ORDER — ONDANSETRON 4 MG/1
4 TABLET, FILM COATED ORAL EVERY 12 HOURS PRN
Qty: 60 TABLET | Refills: 0 | Status: SHIPPED | OUTPATIENT
Start: 2025-07-21 | End: 2025-08-20

## 2025-07-24 SDOH — HEALTH STABILITY: PHYSICAL HEALTH: ON AVERAGE, HOW MANY DAYS PER WEEK DO YOU ENGAGE IN MODERATE TO STRENUOUS EXERCISE (LIKE A BRISK WALK)?: 0 DAYS

## 2025-07-24 SDOH — HEALTH STABILITY: PHYSICAL HEALTH: ON AVERAGE, HOW MANY MINUTES DO YOU ENGAGE IN EXERCISE AT THIS LEVEL?: 10 MIN

## 2025-07-24 ASSESSMENT — LIFESTYLE VARIABLES
HOW OFTEN DO YOU HAVE SIX OR MORE DRINKS ON ONE OCCASION: 1
HOW MANY STANDARD DRINKS CONTAINING ALCOHOL DO YOU HAVE ON A TYPICAL DAY: 0
HOW OFTEN DO YOU HAVE A DRINK CONTAINING ALCOHOL: NEVER
HOW MANY STANDARD DRINKS CONTAINING ALCOHOL DO YOU HAVE ON A TYPICAL DAY: PATIENT DOES NOT DRINK
HOW OFTEN DO YOU HAVE A DRINK CONTAINING ALCOHOL: 1

## 2025-07-24 ASSESSMENT — PATIENT HEALTH QUESTIONNAIRE - PHQ9
1. LITTLE INTEREST OR PLEASURE IN DOING THINGS: SEVERAL DAYS
SUM OF ALL RESPONSES TO PHQ QUESTIONS 1-9: 2
SUM OF ALL RESPONSES TO PHQ QUESTIONS 1-9: 2
2. FEELING DOWN, DEPRESSED OR HOPELESS: SEVERAL DAYS
SUM OF ALL RESPONSES TO PHQ QUESTIONS 1-9: 2
SUM OF ALL RESPONSES TO PHQ QUESTIONS 1-9: 2

## 2025-07-31 ENCOUNTER — OFFICE VISIT (OUTPATIENT)
Age: 65
End: 2025-07-31
Payer: MEDICARE

## 2025-07-31 ENCOUNTER — OFFICE VISIT (OUTPATIENT)
Dept: INTERNAL MEDICINE CLINIC | Age: 65
End: 2025-07-31
Payer: MEDICARE

## 2025-07-31 VITALS
DIASTOLIC BLOOD PRESSURE: 74 MMHG | SYSTOLIC BLOOD PRESSURE: 140 MMHG | OXYGEN SATURATION: 96 % | HEIGHT: 73 IN | BODY MASS INDEX: 41.75 KG/M2 | HEART RATE: 68 BPM | WEIGHT: 315 LBS | TEMPERATURE: 97.6 F

## 2025-07-31 VITALS
HEIGHT: 73 IN | WEIGHT: 315 LBS | BODY MASS INDEX: 41.75 KG/M2 | SYSTOLIC BLOOD PRESSURE: 138 MMHG | HEART RATE: 84 BPM | DIASTOLIC BLOOD PRESSURE: 62 MMHG

## 2025-07-31 DIAGNOSIS — E78.2 MIXED HYPERLIPIDEMIA: ICD-10-CM

## 2025-07-31 DIAGNOSIS — I10 ESSENTIAL HYPERTENSION: ICD-10-CM

## 2025-07-31 DIAGNOSIS — G47.00 INSOMNIA, UNSPECIFIED TYPE: ICD-10-CM

## 2025-07-31 DIAGNOSIS — L89.329 PRESSURE INJURY OF SKIN OF LEFT BUTTOCK, UNSPECIFIED INJURY STAGE: ICD-10-CM

## 2025-07-31 DIAGNOSIS — J96.11 CHRONIC RESPIRATORY FAILURE WITH HYPOXIA (HCC): ICD-10-CM

## 2025-07-31 DIAGNOSIS — F11.20 OPIOID DEPENDENCE WITH CURRENT USE (HCC): ICD-10-CM

## 2025-07-31 DIAGNOSIS — J98.4 RESTRICTIVE LUNG DISEASE: ICD-10-CM

## 2025-07-31 DIAGNOSIS — E66.813 OBESITY, CLASS 3 (HCC): ICD-10-CM

## 2025-07-31 DIAGNOSIS — I50.22 CHRONIC SYSTOLIC (CONGESTIVE) HEART FAILURE (HCC): ICD-10-CM

## 2025-07-31 DIAGNOSIS — F32.9 MAJOR DEPRESSIVE DISORDER WITH CURRENT ACTIVE EPISODE, UNSPECIFIED DEPRESSION EPISODE SEVERITY, UNSPECIFIED WHETHER RECURRENT: ICD-10-CM

## 2025-07-31 DIAGNOSIS — K21.9 GASTROESOPHAGEAL REFLUX DISEASE WITHOUT ESOPHAGITIS: ICD-10-CM

## 2025-07-31 DIAGNOSIS — G47.33 OBSTRUCTIVE SLEEP APNEA: Primary | ICD-10-CM

## 2025-07-31 DIAGNOSIS — G25.81 RESTLESS LEGS: ICD-10-CM

## 2025-07-31 DIAGNOSIS — I50.9 CHF (NYHA CLASS III, ACC/AHA STAGE C) (HCC): ICD-10-CM

## 2025-07-31 DIAGNOSIS — E03.9 HYPOTHYROIDISM, UNSPECIFIED TYPE: ICD-10-CM

## 2025-07-31 DIAGNOSIS — Z00.00 MEDICARE ANNUAL WELLNESS VISIT, SUBSEQUENT: Primary | ICD-10-CM

## 2025-07-31 DIAGNOSIS — N18.6 ESRD (END STAGE RENAL DISEASE) (HCC): ICD-10-CM

## 2025-07-31 PROCEDURE — 3075F SYST BP GE 130 - 139MM HG: CPT | Performed by: INTERNAL MEDICINE

## 2025-07-31 PROCEDURE — 4004F PT TOBACCO SCREEN RCVD TLK: CPT

## 2025-07-31 PROCEDURE — 3077F SYST BP >= 140 MM HG: CPT

## 2025-07-31 PROCEDURE — G0439 PPPS, SUBSEQ VISIT: HCPCS | Performed by: INTERNAL MEDICINE

## 2025-07-31 PROCEDURE — 4004F PT TOBACCO SCREEN RCVD TLK: CPT | Performed by: INTERNAL MEDICINE

## 2025-07-31 PROCEDURE — 3078F DIAST BP <80 MM HG: CPT

## 2025-07-31 PROCEDURE — 99214 OFFICE O/P EST MOD 30 MIN: CPT | Performed by: INTERNAL MEDICINE

## 2025-07-31 PROCEDURE — 99214 OFFICE O/P EST MOD 30 MIN: CPT

## 2025-07-31 PROCEDURE — 3017F COLORECTAL CA SCREEN DOC REV: CPT | Performed by: INTERNAL MEDICINE

## 2025-07-31 PROCEDURE — G8417 CALC BMI ABV UP PARAM F/U: HCPCS | Performed by: INTERNAL MEDICINE

## 2025-07-31 PROCEDURE — 3017F COLORECTAL CA SCREEN DOC REV: CPT

## 2025-07-31 PROCEDURE — G8427 DOCREV CUR MEDS BY ELIG CLIN: HCPCS

## 2025-07-31 PROCEDURE — G8417 CALC BMI ABV UP PARAM F/U: HCPCS

## 2025-07-31 PROCEDURE — 3078F DIAST BP <80 MM HG: CPT | Performed by: INTERNAL MEDICINE

## 2025-07-31 PROCEDURE — G8427 DOCREV CUR MEDS BY ELIG CLIN: HCPCS | Performed by: INTERNAL MEDICINE

## 2025-07-31 RX ORDER — AMLODIPINE BESYLATE 10 MG/1
10 TABLET ORAL DAILY
Qty: 90 TABLET | Refills: 1 | Status: SHIPPED | OUTPATIENT
Start: 2025-07-31

## 2025-07-31 RX ORDER — ATORVASTATIN CALCIUM 40 MG/1
40 TABLET, FILM COATED ORAL DAILY
Qty: 90 TABLET | Refills: 1 | Status: SHIPPED | OUTPATIENT
Start: 2025-07-31

## 2025-07-31 RX ORDER — VENLAFAXINE HYDROCHLORIDE 150 MG/1
150 CAPSULE, EXTENDED RELEASE ORAL DAILY
Qty: 90 CAPSULE | Refills: 1 | Status: SHIPPED | OUTPATIENT
Start: 2025-07-31 | End: 2026-01-27

## 2025-07-31 RX ORDER — PANTOPRAZOLE SODIUM 20 MG/1
20 TABLET, DELAYED RELEASE ORAL
Qty: 90 TABLET | Refills: 3 | Status: SHIPPED | OUTPATIENT
Start: 2025-07-31

## 2025-07-31 RX ORDER — LEVOTHYROXINE SODIUM 200 UG/1
200 TABLET ORAL DAILY
Qty: 90 TABLET | Refills: 1 | Status: SHIPPED | OUTPATIENT
Start: 2025-07-31

## 2025-07-31 RX ORDER — MIRTAZAPINE 15 MG/1
15 TABLET, FILM COATED ORAL NIGHTLY
Qty: 90 TABLET | Refills: 1 | Status: SHIPPED | OUTPATIENT
Start: 2025-07-31

## 2025-07-31 ASSESSMENT — PATIENT HEALTH QUESTIONNAIRE - PHQ9
3. TROUBLE FALLING OR STAYING ASLEEP: NEARLY EVERY DAY
SUM OF ALL RESPONSES TO PHQ QUESTIONS 1-9: 18
5. POOR APPETITE OR OVEREATING: NEARLY EVERY DAY
9. THOUGHTS THAT YOU WOULD BE BETTER OFF DEAD, OR OF HURTING YOURSELF: NOT AT ALL
1. LITTLE INTEREST OR PLEASURE IN DOING THINGS: SEVERAL DAYS
4. FEELING TIRED OR HAVING LITTLE ENERGY: NEARLY EVERY DAY
SUM OF ALL RESPONSES TO PHQ QUESTIONS 1-9: 18
6. FEELING BAD ABOUT YOURSELF - OR THAT YOU ARE A FAILURE OR HAVE LET YOURSELF OR YOUR FAMILY DOWN: MORE THAN HALF THE DAYS
2. FEELING DOWN, DEPRESSED OR HOPELESS: SEVERAL DAYS
7. TROUBLE CONCENTRATING ON THINGS, SUCH AS READING THE NEWSPAPER OR WATCHING TELEVISION: MORE THAN HALF THE DAYS
SUM OF ALL RESPONSES TO PHQ QUESTIONS 1-9: 18
SUM OF ALL RESPONSES TO PHQ QUESTIONS 1-9: 18
10. IF YOU CHECKED OFF ANY PROBLEMS, HOW DIFFICULT HAVE THESE PROBLEMS MADE IT FOR YOU TO DO YOUR WORK, TAKE CARE OF THINGS AT HOME, OR GET ALONG WITH OTHER PEOPLE: SOMEWHAT DIFFICULT
8. MOVING OR SPEAKING SO SLOWLY THAT OTHER PEOPLE COULD HAVE NOTICED. OR THE OPPOSITE, BEING SO FIGETY OR RESTLESS THAT YOU HAVE BEEN MOVING AROUND A LOT MORE THAN USUAL: NEARLY EVERY DAY

## 2025-07-31 NOTE — PROGRESS NOTES
[facility]      Farhad Myles         [id number]  7/31/2025   Chief Complaint   Patient presents with    Follow-up     6 month LAURIE f/u [facility]         Assessment/Plan     Assessment & Plan  1. Obstructive sleep apnea: Stable. AHI 5.4.  - Ensure consistent use of BiPAP machine to prevent potential complications such as elevated carbon dioxide levels.  - Maintain current BiPAP settings at 17/13.  - Place supply order for BiPAP machine.  - Strongly encouraged better compliance     2. Fluid retention: Chronic. 25-pound weight gain since 11/2024.  - Monitor weight closely.  - Inform nephrologist if there is no improvement.  - Continue diuretics as prescribed    3. Restless legs syndrome: Chronic.  - Continue Requip as prescribed by primary care provider     4. Insomnia. stable  - Continue melatonin    5. Heart failure: Chronic.  - Continue current medication regimen including Bumex.  - Follow up with heart failure clinic.    6. Chronic respiratory failure with hypoxia  - Continue 2-3 lpm at rest and 4 lpm with exertion    Order handicap parking placard for respiratory failure, sleep apnea, and heart failure.       -Download reviewed and discussed with patient.  -The symptoms of LAURIE have improved. The patient is benefiting from therapy and should continue use of PAP device. I have reviewed the download.   -Advised to continue current positive airway pressure therapy with above described pressure.   -Advised to keep good compliance with current recommended pressure to get optimal results and clinical improvement  -Recommend 7-9 hours of sleep with PAP  -Instructed to call [employer] regarding supplies if needed.   -Patient to call my office for earlier appointment if needed for worsening of sleep symptoms.   -Patient is not to drive if feeling sleepy    Counseled patient on weight loss        Educated about my impression and plan. Patient verbalizes understanding.    Return

## 2025-08-04 ENCOUNTER — APPOINTMENT (OUTPATIENT)
Dept: CT IMAGING | Age: 65
DRG: 640 | End: 2025-08-04
Payer: MEDICARE

## 2025-08-04 ENCOUNTER — HOSPITAL ENCOUNTER (INPATIENT)
Age: 65
LOS: 1 days | Discharge: HOME | DRG: 640 | End: 2025-08-06
Attending: EMERGENCY MEDICINE | Admitting: FAMILY MEDICINE
Payer: MEDICARE

## 2025-08-04 ENCOUNTER — APPOINTMENT (OUTPATIENT)
Dept: GENERAL RADIOLOGY | Age: 65
DRG: 640 | End: 2025-08-04
Payer: MEDICARE

## 2025-08-04 DIAGNOSIS — I50.32 CHRONIC DIASTOLIC CONGESTIVE HEART FAILURE (HCC): ICD-10-CM

## 2025-08-04 DIAGNOSIS — J96.02 ACUTE HYPERCAPNIC RESPIRATORY FAILURE (HCC): ICD-10-CM

## 2025-08-04 DIAGNOSIS — R41.82 ALTERED MENTAL STATUS, UNSPECIFIED ALTERED MENTAL STATUS TYPE: Primary | ICD-10-CM

## 2025-08-04 DIAGNOSIS — I50.9 CONGESTIVE HEART FAILURE, UNSPECIFIED HF CHRONICITY, UNSPECIFIED HEART FAILURE TYPE (HCC): ICD-10-CM

## 2025-08-04 LAB
APTT PPP: 33.6 SECONDS (ref 22–38)
ARTERIAL PATENCY WRIST A: POSITIVE
BASE EXCESS BLDA CALC-SCNC: -0.2 MMOL/L (ref -2.5–2.5)
BASOPHILS ABSOLUTE: 0 THOU/MM3 (ref 0–0.1)
BASOPHILS NFR BLD AUTO: 0.5 %
BDY SITE: ABNORMAL
CA-I BLD ISE-SCNC: 1.21 MMOL/L (ref 1.12–1.32)
CHLORIDE BLD-SCNC: 98 MEQ/L (ref 98–109)
COLLECTED BY:: ABNORMAL
DEPRECATED RDW RBC AUTO: 52.7 FL (ref 35–45)
DEVICE: ABNORMAL
EOSINOPHIL NFR BLD AUTO: 3 %
EOSINOPHILS ABSOLUTE: 0.2 THOU/MM3 (ref 0–0.4)
ERYTHROCYTE [DISTWIDTH] IN BLOOD BY AUTOMATED COUNT: 14.6 % (ref 11.5–14.5)
FIO2 ON VENT O2 ANALYZER: 4 %
GLUCOSE BLD STRIP.AUTO-MCNC: 102 MG/DL (ref 70–108)
GLUCOSE BLD-MCNC: 93 MG/DL (ref 70–108)
HCO3 BLDA-SCNC: 27 MMOL/L (ref 23–28)
HCT VFR BLD AUTO: 29.8 % (ref 42–52)
HGB BLD-MCNC: 9.7 GM/DL (ref 14–18)
IMM GRANULOCYTES # BLD AUTO: 0.03 THOU/MM3 (ref 0–0.07)
IMM GRANULOCYTES NFR BLD AUTO: 0.4 %
INR PPP: 1.09 (ref 0.85–1.13)
LACTATE BLD-SCNC: 2 MMOL/L (ref 0.5–1.9)
LYMPHOCYTES ABSOLUTE: 0.4 THOU/MM3 (ref 1–4.8)
LYMPHOCYTES NFR BLD AUTO: 4.8 %
MCH RBC QN AUTO: 32.1 PG (ref 26–33)
MCHC RBC AUTO-ENTMCNC: 32.6 GM/DL (ref 32.2–35.5)
MCV RBC AUTO: 98.7 FL (ref 80–94)
MONOCYTES ABSOLUTE: 0.8 THOU/MM3 (ref 0.4–1.3)
MONOCYTES NFR BLD AUTO: 9.4 %
NEUTROPHILS ABSOLUTE: 6.6 THOU/MM3 (ref 1.8–7.7)
NEUTROPHILS NFR BLD AUTO: 81.9 %
NRBC BLD AUTO-RTO: 0 /100 WBC
PCO2 BLDA: 60 MMHG (ref 35–45)
PH BLDA: 7.27 [PH] (ref 7.35–7.45)
PLATELET # BLD AUTO: 190 THOU/MM3 (ref 130–400)
PMV BLD AUTO: 9.2 FL (ref 9.4–12.4)
PO2 BLDA: 72 MMHG (ref 71–104)
POC CREATININE WHOLE BLOOD: 7.9 MG/DL (ref 0.5–1.2)
POTASSIUM BLD-SCNC: 4.2 MEQ/L (ref 3.5–4.9)
PROTHROMBIN TIME: 12.7 SECONDS (ref 10–13.5)
RBC # BLD AUTO: 3.02 MILL/MM3 (ref 4.7–6.1)
SAO2 % BLDA: 91 %
SODIUM BLD-SCNC: 136 MEQ/L (ref 138–146)
VENTILATION MODE VENT: ABNORMAL
WBC # BLD AUTO: 8.1 THOU/MM3 (ref 4.8–10.8)

## 2025-08-04 PROCEDURE — 84132 ASSAY OF SERUM POTASSIUM: CPT

## 2025-08-04 PROCEDURE — 70450 CT HEAD/BRAIN W/O DYE: CPT

## 2025-08-04 PROCEDURE — 94660 CPAP INITIATION&MGMT: CPT

## 2025-08-04 PROCEDURE — 84295 ASSAY OF SERUM SODIUM: CPT

## 2025-08-04 PROCEDURE — 85730 THROMBOPLASTIN TIME PARTIAL: CPT

## 2025-08-04 PROCEDURE — 85025 COMPLETE CBC W/AUTO DIFF WBC: CPT

## 2025-08-04 PROCEDURE — 93005 ELECTROCARDIOGRAM TRACING: CPT | Performed by: NURSE PRACTITIONER

## 2025-08-04 PROCEDURE — 82330 ASSAY OF CALCIUM: CPT

## 2025-08-04 PROCEDURE — 82948 REAGENT STRIP/BLOOD GLUCOSE: CPT

## 2025-08-04 PROCEDURE — 82435 ASSAY OF BLOOD CHLORIDE: CPT

## 2025-08-04 PROCEDURE — 36415 COLL VENOUS BLD VENIPUNCTURE: CPT

## 2025-08-04 PROCEDURE — 80053 COMPREHEN METABOLIC PANEL: CPT

## 2025-08-04 PROCEDURE — 6360000004 HC RX CONTRAST MEDICATION: Performed by: EMERGENCY MEDICINE

## 2025-08-04 PROCEDURE — 84484 ASSAY OF TROPONIN QUANT: CPT

## 2025-08-04 PROCEDURE — 84443 ASSAY THYROID STIM HORMONE: CPT

## 2025-08-04 PROCEDURE — 85610 PROTHROMBIN TIME: CPT

## 2025-08-04 PROCEDURE — 36600 WITHDRAWAL OF ARTERIAL BLOOD: CPT

## 2025-08-04 PROCEDURE — 83690 ASSAY OF LIPASE: CPT

## 2025-08-04 PROCEDURE — 94761 N-INVAS EAR/PLS OXIMETRY MLT: CPT

## 2025-08-04 PROCEDURE — 82565 ASSAY OF CREATININE: CPT

## 2025-08-04 PROCEDURE — 83880 ASSAY OF NATRIURETIC PEPTIDE: CPT

## 2025-08-04 PROCEDURE — 5A09357 ASSISTANCE WITH RESPIRATORY VENTILATION, LESS THAN 24 CONSECUTIVE HOURS, CONTINUOUS POSITIVE AIRWAY PRESSURE: ICD-10-PCS | Performed by: INTERNAL MEDICINE

## 2025-08-04 PROCEDURE — 83735 ASSAY OF MAGNESIUM: CPT

## 2025-08-04 PROCEDURE — 83605 ASSAY OF LACTIC ACID: CPT

## 2025-08-04 PROCEDURE — 82947 ASSAY GLUCOSE BLOOD QUANT: CPT

## 2025-08-04 PROCEDURE — 71045 X-RAY EXAM CHEST 1 VIEW: CPT

## 2025-08-04 PROCEDURE — 82803 BLOOD GASES ANY COMBINATION: CPT

## 2025-08-04 PROCEDURE — 2700000000 HC OXYGEN THERAPY PER DAY

## 2025-08-04 PROCEDURE — 82248 BILIRUBIN DIRECT: CPT

## 2025-08-04 PROCEDURE — 71275 CT ANGIOGRAPHY CHEST: CPT

## 2025-08-04 PROCEDURE — 99285 EMERGENCY DEPT VISIT HI MDM: CPT

## 2025-08-04 PROCEDURE — 84439 ASSAY OF FREE THYROXINE: CPT

## 2025-08-04 RX ORDER — IOPAMIDOL 755 MG/ML
80 INJECTION, SOLUTION INTRAVASCULAR
Status: COMPLETED | OUTPATIENT
Start: 2025-08-04 | End: 2025-08-04

## 2025-08-04 RX ADMIN — IOPAMIDOL 80 ML: 755 INJECTION, SOLUTION INTRAVENOUS at 23:34

## 2025-08-05 ENCOUNTER — APPOINTMENT (OUTPATIENT)
Age: 65
DRG: 640 | End: 2025-08-05
Payer: MEDICARE

## 2025-08-05 PROBLEM — G93.41 ACUTE METABOLIC ENCEPHALOPATHY: Status: ACTIVE | Noted: 2025-08-05

## 2025-08-05 PROBLEM — J96.02 ACUTE HYPERCAPNIC RESPIRATORY FAILURE (HCC): Status: ACTIVE | Noted: 2025-08-05

## 2025-08-05 PROBLEM — E87.1 HYPONATREMIA: Status: ACTIVE | Noted: 2025-08-05

## 2025-08-05 LAB
ALBUMIN SERPL BCG-MCNC: 3.8 G/DL (ref 3.4–4.9)
ALP SERPL-CCNC: 99 U/L (ref 40–129)
ALT SERPL W/O P-5'-P-CCNC: 13 U/L (ref 10–50)
AMMONIA PLAS-MCNC: 47 UMOL/L (ref 16–60)
AMPHETAMINES UR QL SCN: NEGATIVE
ANION GAP SERPL CALC-SCNC: 15 MEQ/L (ref 8–16)
ARTERIAL PATENCY WRIST A: POSITIVE
ARTERIAL PATENCY WRIST A: POSITIVE
AST SERPL-CCNC: 15 U/L (ref 10–50)
BACTERIA URNS QL MICRO: ABNORMAL /HPF
BARBITURATES UR QL SCN: POSITIVE
BASE EXCESS BLDA CALC-SCNC: 1 MMOL/L (ref -2.5–2.5)
BASE EXCESS BLDA CALC-SCNC: 2.8 MMOL/L (ref -2.5–2.5)
BDY SITE: ABNORMAL
BDY SITE: ABNORMAL
BENZODIAZ UR QL SCN: NEGATIVE
BILIRUB CONJ SERPL-MCNC: < 0.1 MG/DL (ref 0–0.2)
BILIRUB SERPL-MCNC: 0.3 MG/DL (ref 0.3–1.2)
BILIRUB UR QL STRIP.AUTO: NEGATIVE
BREATHS SETTING VENT: 16 BPM
BUN SERPL-MCNC: 55 MG/DL (ref 8–23)
BUPRENORPHINE URINE: NEGATIVE
BZE UR QL SCN: NEGATIVE
CALCIUM SERPL-MCNC: 8.7 MG/DL (ref 8.8–10.2)
CANNABINOIDS UR QL SCN: NEGATIVE
CASTS #/AREA URNS LPF: ABNORMAL /LPF
CASTS 2: ABNORMAL /LPF
CHARACTER UR: CLEAR
CHLORIDE SERPL-SCNC: 94 MEQ/L (ref 98–111)
CO2 SERPL-SCNC: 24 MEQ/L (ref 22–29)
COLLECTED BY:: ABNORMAL
COLLECTED BY:: ABNORMAL
COLOR, UA: YELLOW
CREAT SERPL-MCNC: 7.4 MG/DL (ref 0.7–1.2)
CRYSTALS URNS MICRO: ABNORMAL
DEVICE: ABNORMAL
DEVICE: ABNORMAL
ECHO AV CUSP MM: 1.9 CM
ECHO BSA: 2.72 M2
ECHO EST RA PRESSURE: 3 MMHG
ECHO LA DIAMETER INDEX: 1.46 CM/M2
ECHO LA DIAMETER: 3.8 CM
ECHO LV EDV A4C: 236 ML
ECHO LV EDV INDEX A4C: 90 ML/M2
ECHO LV EF PHYSICIAN: 55 %
ECHO LV EJECTION FRACTION A4C: 51 %
ECHO LV ESV A4C: 115 ML
ECHO LV ESV INDEX A4C: 44 ML/M2
ECHO LV FRACTIONAL SHORTENING: 28 % (ref 28–44)
ECHO LV INTERNAL DIMENSION DIASTOLE INDEX: 2.18 CM/M2
ECHO LV INTERNAL DIMENSION DIASTOLIC: 5.7 CM (ref 4.2–5.9)
ECHO LV INTERNAL DIMENSION SYSTOLIC INDEX: 1.57 CM/M2
ECHO LV INTERNAL DIMENSION SYSTOLIC: 4.1 CM
ECHO LV IVSD: 1.5 CM (ref 0.6–1)
ECHO LV MASS 2D: 375.7 G (ref 88–224)
ECHO LV MASS INDEX 2D: 144 G/M2 (ref 49–115)
ECHO LV POSTERIOR WALL DIASTOLIC: 1.4 CM (ref 0.6–1)
ECHO LV RELATIVE WALL THICKNESS RATIO: 0.49
ECHO RV INTERNAL DIMENSION: 3.5 CM
EKG ATRIAL RATE: 66 BPM
EKG ATRIAL RATE: 73 BPM
EKG P AXIS: 114 DEGREES
EKG P AXIS: 35 DEGREES
EKG P-R INTERVAL: 218 MS
EKG P-R INTERVAL: 228 MS
EKG Q-T INTERVAL: 454 MS
EKG Q-T INTERVAL: 460 MS
EKG QRS DURATION: 174 MS
EKG QRS DURATION: 178 MS
EKG QTC CALCULATION (BAZETT): 475 MS
EKG QTC CALCULATION (BAZETT): 506 MS
EKG R AXIS: 16 DEGREES
EKG R AXIS: 7 DEGREES
EKG T AXIS: -3 DEGREES
EKG T AXIS: -5 DEGREES
EKG VENTRICULAR RATE: 66 BPM
EKG VENTRICULAR RATE: 73 BPM
EPITHELIAL CELLS, UA: ABNORMAL /HPF
FENTANYL: NEGATIVE
FIO2 ON VENT O2 ANALYZER: 30 %
FIO2 ON VENT O2 ANALYZER: 45 %
GFR SERPL CREATININE-BSD FRML MDRD: 8 ML/MIN/1.73M2
GLUCOSE BLD STRIP.AUTO-MCNC: 85 MG/DL (ref 70–108)
GLUCOSE BLD STRIP.AUTO-MCNC: 88 MG/DL (ref 70–108)
GLUCOSE BLD STRIP.AUTO-MCNC: 98 MG/DL (ref 70–108)
GLUCOSE SERPL-MCNC: 87 MG/DL (ref 74–109)
GLUCOSE UR QL STRIP.AUTO: NEGATIVE MG/DL
HCO3 BLDA-SCNC: 29 MMOL/L (ref 23–28)
HCO3 BLDA-SCNC: 30 MMOL/L (ref 23–28)
HGB UR QL STRIP.AUTO: ABNORMAL
KETONES UR QL STRIP.AUTO: NEGATIVE
LACTATE SERPL-SCNC: 0.9 MMOL/L (ref 0.5–2.2)
LIPASE SERPL-CCNC: 23 U/L (ref 13–60)
MAGNESIUM SERPL-MCNC: 2.1 MG/DL (ref 1.6–2.6)
MISCELLANEOUS 2: ABNORMAL
NITRITE UR QL STRIP: NEGATIVE
NT-PROBNP SERPL IA-MCNC: 7224 PG/ML (ref 0–124)
OPIATES UR QL SCN: NEGATIVE
OSMOLALITY SERPL CALC.SUM OF ELEC: 280.9 MOSMOL/KG (ref 275–300)
OXYCODONE: POSITIVE
PCO2 BLDA: 64 MMHG (ref 35–45)
PCO2 BLDA: 66 MMHG (ref 35–45)
PCP UR QL SCN: NEGATIVE
PEEP SETTING VENT: 8 MMHG
PEEP SETTING VENT: 8 MMHG
PH BLDA: 7.25 [PH] (ref 7.35–7.45)
PH BLDA: 7.29 [PH] (ref 7.35–7.45)
PH UR STRIP.AUTO: 5.5 [PH] (ref 5–9)
PIP: 18 CMH2O
PIP: 22 CMH2O
PO2 BLDA: 162 MMHG (ref 71–104)
PO2 BLDA: 78 MMHG (ref 71–104)
POTASSIUM SERPL-SCNC: 4.2 MEQ/L (ref 3.5–5.2)
PROT SERPL-MCNC: 7.4 G/DL (ref 6.4–8.3)
PROT UR STRIP.AUTO-MCNC: 300 MG/DL
RBC URINE: ABNORMAL /HPF
RENAL EPI CELLS #/AREA URNS HPF: ABNORMAL /[HPF]
SAO2 % BLDA: 93 %
SAO2 % BLDA: 99 %
SODIUM SERPL-SCNC: 133 MEQ/L (ref 135–145)
SP GR UR REFRACT.AUTO: 1.02 (ref 1–1.03)
T4 FREE SERPL-MCNC: 0.6 NG/DL (ref 0.92–1.68)
TROPONIN, HIGH SENSITIVITY: 82 NG/L (ref 0–12)
TROPONIN, HIGH SENSITIVITY: 90 NG/L (ref 0–12)
TSH SERPL DL<=0.05 MIU/L-ACNC: 22.9 UIU/ML (ref 0.27–4.2)
UROBILINOGEN, URINE: 0.2 EU/DL (ref 0–1)
VENTILATION MODE VENT: ABNORMAL
VENTILATION MODE VENT: ABNORMAL
WBC #/AREA URNS HPF: ABNORMAL /HPF
WBC #/AREA URNS HPF: ABNORMAL /[HPF]
YEAST LIKE FUNGI URNS QL MICRO: ABNORMAL

## 2025-08-05 PROCEDURE — 99222 1ST HOSP IP/OBS MODERATE 55: CPT | Performed by: INTERNAL MEDICINE

## 2025-08-05 PROCEDURE — 82140 ASSAY OF AMMONIA: CPT

## 2025-08-05 PROCEDURE — 81001 URINALYSIS AUTO W/SCOPE: CPT

## 2025-08-05 PROCEDURE — 82948 REAGENT STRIP/BLOOD GLUCOSE: CPT

## 2025-08-05 PROCEDURE — 94761 N-INVAS EAR/PLS OXIMETRY MLT: CPT

## 2025-08-05 PROCEDURE — 5A1D70Z PERFORMANCE OF URINARY FILTRATION, INTERMITTENT, LESS THAN 6 HOURS PER DAY: ICD-10-PCS | Performed by: INTERNAL MEDICINE

## 2025-08-05 PROCEDURE — 36415 COLL VENOUS BLD VENIPUNCTURE: CPT

## 2025-08-05 PROCEDURE — 94660 CPAP INITIATION&MGMT: CPT

## 2025-08-05 PROCEDURE — 93010 ELECTROCARDIOGRAM REPORT: CPT | Performed by: INTERNAL MEDICINE

## 2025-08-05 PROCEDURE — 83605 ASSAY OF LACTIC ACID: CPT

## 2025-08-05 PROCEDURE — 93306 TTE W/DOPPLER COMPLETE: CPT | Performed by: INTERNAL MEDICINE

## 2025-08-05 PROCEDURE — 2140000000 HC CCU INTERMEDIATE R&B

## 2025-08-05 PROCEDURE — 2500000003 HC RX 250 WO HCPCS

## 2025-08-05 PROCEDURE — 99223 1ST HOSP IP/OBS HIGH 75: CPT

## 2025-08-05 PROCEDURE — 2700000000 HC OXYGEN THERAPY PER DAY

## 2025-08-05 PROCEDURE — 82803 BLOOD GASES ANY COMBINATION: CPT

## 2025-08-05 PROCEDURE — 90935 HEMODIALYSIS ONE EVALUATION: CPT

## 2025-08-05 PROCEDURE — 90935 HEMODIALYSIS ONE EVALUATION: CPT | Performed by: INTERNAL MEDICINE

## 2025-08-05 PROCEDURE — 84484 ASSAY OF TROPONIN QUANT: CPT

## 2025-08-05 PROCEDURE — 93308 TTE F-UP OR LMTD: CPT

## 2025-08-05 PROCEDURE — 6360000002 HC RX W HCPCS

## 2025-08-05 PROCEDURE — 6360000002 HC RX W HCPCS: Performed by: NURSE PRACTITIONER

## 2025-08-05 PROCEDURE — 96374 THER/PROPH/DIAG INJ IV PUSH: CPT

## 2025-08-05 PROCEDURE — 80307 DRUG TEST PRSMV CHEM ANLYZR: CPT

## 2025-08-05 PROCEDURE — 36600 WITHDRAWAL OF ARTERIAL BLOOD: CPT

## 2025-08-05 RX ORDER — CLOPIDOGREL BISULFATE 75 MG/1
75 TABLET ORAL DAILY
Status: DISCONTINUED | OUTPATIENT
Start: 2025-08-05 | End: 2025-08-06 | Stop reason: HOSPADM

## 2025-08-05 RX ORDER — MONTELUKAST SODIUM 10 MG/1
10 TABLET ORAL NIGHTLY
Status: DISCONTINUED | OUTPATIENT
Start: 2025-08-05 | End: 2025-08-06 | Stop reason: HOSPADM

## 2025-08-05 RX ORDER — PRIMIDONE 50 MG/1
50 TABLET ORAL NIGHTLY
Status: DISCONTINUED | OUTPATIENT
Start: 2025-08-05 | End: 2025-08-06 | Stop reason: HOSPADM

## 2025-08-05 RX ORDER — IPRATROPIUM BROMIDE AND ALBUTEROL SULFATE 2.5; .5 MG/3ML; MG/3ML
1 SOLUTION RESPIRATORY (INHALATION) EVERY 4 HOURS PRN
Status: DISCONTINUED | OUTPATIENT
Start: 2025-08-05 | End: 2025-08-06 | Stop reason: HOSPADM

## 2025-08-05 RX ORDER — LEVOTHYROXINE SODIUM 20 UG/ML
168.75 INJECTION, SOLUTION INTRAVENOUS DAILY
Status: DISCONTINUED | OUTPATIENT
Start: 2025-08-05 | End: 2025-08-06 | Stop reason: HOSPADM

## 2025-08-05 RX ORDER — ROPINIROLE 0.5 MG/1
1 TABLET, FILM COATED ORAL NIGHTLY
Status: DISCONTINUED | OUTPATIENT
Start: 2025-08-05 | End: 2025-08-06 | Stop reason: HOSPADM

## 2025-08-05 RX ORDER — MIRTAZAPINE 15 MG/1
15 TABLET, FILM COATED ORAL NIGHTLY
Status: DISCONTINUED | OUTPATIENT
Start: 2025-08-05 | End: 2025-08-06 | Stop reason: HOSPADM

## 2025-08-05 RX ORDER — SODIUM CHLORIDE 0.9 % (FLUSH) 0.9 %
5-40 SYRINGE (ML) INJECTION PRN
Status: DISCONTINUED | OUTPATIENT
Start: 2025-08-05 | End: 2025-08-06 | Stop reason: HOSPADM

## 2025-08-05 RX ORDER — ONDANSETRON 2 MG/ML
4 INJECTION INTRAMUSCULAR; INTRAVENOUS EVERY 6 HOURS PRN
Status: DISCONTINUED | OUTPATIENT
Start: 2025-08-05 | End: 2025-08-06 | Stop reason: HOSPADM

## 2025-08-05 RX ORDER — POLYETHYLENE GLYCOL 3350 17 G/17G
17 POWDER, FOR SOLUTION ORAL DAILY PRN
Status: DISCONTINUED | OUTPATIENT
Start: 2025-08-05 | End: 2025-08-06 | Stop reason: HOSPADM

## 2025-08-05 RX ORDER — PRIMIDONE 50 MG/1
100 TABLET ORAL EVERY MORNING
Status: DISCONTINUED | OUTPATIENT
Start: 2025-08-05 | End: 2025-08-06 | Stop reason: HOSPADM

## 2025-08-05 RX ORDER — VENLAFAXINE HYDROCHLORIDE 150 MG/1
150 CAPSULE, EXTENDED RELEASE ORAL DAILY
Status: DISCONTINUED | OUTPATIENT
Start: 2025-08-05 | End: 2025-08-06 | Stop reason: HOSPADM

## 2025-08-05 RX ORDER — SODIUM CHLORIDE 0.9 % (FLUSH) 0.9 %
5-40 SYRINGE (ML) INJECTION EVERY 12 HOURS SCHEDULED
Status: DISCONTINUED | OUTPATIENT
Start: 2025-08-05 | End: 2025-08-06 | Stop reason: HOSPADM

## 2025-08-05 RX ORDER — CALCITRIOL 0.25 UG/1
0.25 CAPSULE, LIQUID FILLED ORAL
Status: DISCONTINUED | OUTPATIENT
Start: 2025-08-06 | End: 2025-08-06 | Stop reason: HOSPADM

## 2025-08-05 RX ORDER — CARVEDILOL 25 MG/1
25 TABLET ORAL 2 TIMES DAILY
Status: DISCONTINUED | OUTPATIENT
Start: 2025-08-05 | End: 2025-08-06 | Stop reason: HOSPADM

## 2025-08-05 RX ORDER — GLUCAGON 1 MG/ML
1 KIT INJECTION PRN
Status: DISCONTINUED | OUTPATIENT
Start: 2025-08-05 | End: 2025-08-06 | Stop reason: HOSPADM

## 2025-08-05 RX ORDER — ACETAMINOPHEN 650 MG/1
650 SUPPOSITORY RECTAL EVERY 6 HOURS PRN
Status: DISCONTINUED | OUTPATIENT
Start: 2025-08-05 | End: 2025-08-06 | Stop reason: HOSPADM

## 2025-08-05 RX ORDER — ASPIRIN 81 MG/1
81 TABLET ORAL DAILY
Status: DISCONTINUED | OUTPATIENT
Start: 2025-08-05 | End: 2025-08-06 | Stop reason: HOSPADM

## 2025-08-05 RX ORDER — PANTOPRAZOLE SODIUM 40 MG/10ML
40 INJECTION, POWDER, LYOPHILIZED, FOR SOLUTION INTRAVENOUS DAILY
Status: DISCONTINUED | OUTPATIENT
Start: 2025-08-05 | End: 2025-08-06 | Stop reason: HOSPADM

## 2025-08-05 RX ORDER — BUMETANIDE 0.25 MG/ML
2 INJECTION, SOLUTION INTRAMUSCULAR; INTRAVENOUS ONCE
Status: DISCONTINUED | OUTPATIENT
Start: 2025-08-05 | End: 2025-08-05

## 2025-08-05 RX ORDER — BUMETANIDE 0.25 MG/ML
1 INJECTION, SOLUTION INTRAMUSCULAR; INTRAVENOUS ONCE
Status: COMPLETED | OUTPATIENT
Start: 2025-08-05 | End: 2025-08-05

## 2025-08-05 RX ORDER — ACETAMINOPHEN 325 MG/1
650 TABLET ORAL EVERY 6 HOURS PRN
Status: DISCONTINUED | OUTPATIENT
Start: 2025-08-05 | End: 2025-08-06 | Stop reason: HOSPADM

## 2025-08-05 RX ORDER — HEPARIN SODIUM 5000 [USP'U]/ML
5000 INJECTION, SOLUTION INTRAVENOUS; SUBCUTANEOUS EVERY 8 HOURS SCHEDULED
Status: DISCONTINUED | OUTPATIENT
Start: 2025-08-05 | End: 2025-08-06 | Stop reason: HOSPADM

## 2025-08-05 RX ORDER — ATORVASTATIN CALCIUM 40 MG/1
40 TABLET, FILM COATED ORAL DAILY
Status: DISCONTINUED | OUTPATIENT
Start: 2025-08-05 | End: 2025-08-06 | Stop reason: HOSPADM

## 2025-08-05 RX ORDER — BUMETANIDE 1 MG/1
2 TABLET ORAL DAILY
Status: DISCONTINUED | OUTPATIENT
Start: 2025-08-05 | End: 2025-08-06 | Stop reason: HOSPADM

## 2025-08-05 RX ORDER — AMLODIPINE BESYLATE 10 MG/1
10 TABLET ORAL DAILY
Status: DISCONTINUED | OUTPATIENT
Start: 2025-08-05 | End: 2025-08-06 | Stop reason: HOSPADM

## 2025-08-05 RX ORDER — SEVELAMER CARBONATE 800 MG/1
2400 TABLET, FILM COATED ORAL
Status: DISCONTINUED | OUTPATIENT
Start: 2025-08-05 | End: 2025-08-06 | Stop reason: HOSPADM

## 2025-08-05 RX ORDER — GABAPENTIN 300 MG/1
300 CAPSULE ORAL NIGHTLY
Status: DISCONTINUED | OUTPATIENT
Start: 2025-08-05 | End: 2025-08-06 | Stop reason: HOSPADM

## 2025-08-05 RX ORDER — DEXTROSE MONOHYDRATE 100 MG/ML
INJECTION, SOLUTION INTRAVENOUS CONTINUOUS PRN
Status: DISCONTINUED | OUTPATIENT
Start: 2025-08-05 | End: 2025-08-06 | Stop reason: HOSPADM

## 2025-08-05 RX ORDER — TAMSULOSIN HYDROCHLORIDE 0.4 MG/1
0.4 CAPSULE ORAL DAILY
Status: DISCONTINUED | OUTPATIENT
Start: 2025-08-05 | End: 2025-08-05

## 2025-08-05 RX ORDER — SODIUM CHLORIDE 9 MG/ML
INJECTION, SOLUTION INTRAVENOUS PRN
Status: DISCONTINUED | OUTPATIENT
Start: 2025-08-05 | End: 2025-08-06 | Stop reason: HOSPADM

## 2025-08-05 RX ORDER — ONDANSETRON 4 MG/1
4 TABLET, ORALLY DISINTEGRATING ORAL EVERY 8 HOURS PRN
Status: DISCONTINUED | OUTPATIENT
Start: 2025-08-05 | End: 2025-08-06 | Stop reason: HOSPADM

## 2025-08-05 RX ADMIN — HEPARIN SODIUM 5000 UNITS: 5000 INJECTION INTRAVENOUS; SUBCUTANEOUS at 13:45

## 2025-08-05 RX ADMIN — SODIUM CHLORIDE, PRESERVATIVE FREE 10 ML: 5 INJECTION INTRAVENOUS at 21:39

## 2025-08-05 RX ADMIN — BUMETANIDE 1 MG: 0.25 INJECTION INTRAMUSCULAR; INTRAVENOUS at 01:22

## 2025-08-05 RX ADMIN — LEVOTHYROXINE SODIUM 168.75 MCG: 20 INJECTION, SOLUTION INTRAVENOUS at 05:30

## 2025-08-05 RX ADMIN — HEPARIN SODIUM 5000 UNITS: 5000 INJECTION INTRAVENOUS; SUBCUTANEOUS at 05:30

## 2025-08-05 RX ADMIN — HEPARIN SODIUM 5000 UNITS: 5000 INJECTION INTRAVENOUS; SUBCUTANEOUS at 21:39

## 2025-08-05 ASSESSMENT — PAIN SCALES - GENERAL
PAINLEVEL_OUTOF10: 0

## 2025-08-06 VITALS
HEART RATE: 82 BPM | WEIGHT: 298.72 LBS | TEMPERATURE: 98.2 F | HEIGHT: 73 IN | BODY MASS INDEX: 39.59 KG/M2 | SYSTOLIC BLOOD PRESSURE: 156 MMHG | OXYGEN SATURATION: 94 % | DIASTOLIC BLOOD PRESSURE: 69 MMHG | RESPIRATION RATE: 20 BRPM

## 2025-08-06 PROBLEM — R41.82 ALTERED MENTAL STATUS: Status: ACTIVE | Noted: 2025-08-06

## 2025-08-06 LAB
ALBUMIN SERPL BCG-MCNC: 3.4 G/DL (ref 3.4–4.9)
ALP SERPL-CCNC: 76 U/L (ref 40–129)
ALT SERPL W/O P-5'-P-CCNC: 7 U/L (ref 10–50)
ANION GAP SERPL CALC-SCNC: 18 MEQ/L (ref 8–16)
APTT PPP: 30.3 SECONDS (ref 22–38)
AST SERPL-CCNC: 12 U/L (ref 10–50)
BASOPHILS ABSOLUTE: 0 THOU/MM3 (ref 0–0.1)
BASOPHILS NFR BLD AUTO: 0.5 %
BILIRUB SERPL-MCNC: 0.4 MG/DL (ref 0.3–1.2)
BUN SERPL-MCNC: 32 MG/DL (ref 8–23)
CALCIUM SERPL-MCNC: 9.1 MG/DL (ref 8.8–10.2)
CHLORIDE SERPL-SCNC: 98 MEQ/L (ref 98–111)
CO2 SERPL-SCNC: 20 MEQ/L (ref 22–29)
CREAT SERPL-MCNC: 6.1 MG/DL (ref 0.7–1.2)
DEPRECATED RDW RBC AUTO: 52.2 FL (ref 35–45)
EOSINOPHIL NFR BLD AUTO: 4.4 %
EOSINOPHILS ABSOLUTE: 0.3 THOU/MM3 (ref 0–0.4)
ERYTHROCYTE [DISTWIDTH] IN BLOOD BY AUTOMATED COUNT: 14.2 % (ref 11.5–14.5)
GFR SERPL CREATININE-BSD FRML MDRD: 10 ML/MIN/1.73M2
GLUCOSE BLD STRIP.AUTO-MCNC: 80 MG/DL (ref 70–108)
GLUCOSE SERPL-MCNC: 81 MG/DL (ref 74–109)
HCT VFR BLD AUTO: 29.3 % (ref 42–52)
HGB BLD-MCNC: 9.4 GM/DL (ref 14–18)
IMM GRANULOCYTES # BLD AUTO: 0.03 THOU/MM3 (ref 0–0.07)
IMM GRANULOCYTES NFR BLD AUTO: 0.5 %
INR PPP: 1.16 (ref 0.85–1.13)
LYMPHOCYTES ABSOLUTE: 0.4 THOU/MM3 (ref 1–4.8)
LYMPHOCYTES NFR BLD AUTO: 6.2 %
MCH RBC QN AUTO: 32 PG (ref 26–33)
MCHC RBC AUTO-ENTMCNC: 32.1 GM/DL (ref 32.2–35.5)
MCV RBC AUTO: 99.7 FL (ref 80–94)
MONOCYTES ABSOLUTE: 0.6 THOU/MM3 (ref 0.4–1.3)
MONOCYTES NFR BLD AUTO: 10.8 %
NEUTROPHILS ABSOLUTE: 4.6 THOU/MM3 (ref 1.8–7.7)
NEUTROPHILS NFR BLD AUTO: 77.6 %
NRBC BLD AUTO-RTO: 0 /100 WBC
PLATELET # BLD AUTO: 145 THOU/MM3 (ref 130–400)
PMV BLD AUTO: 9.1 FL (ref 9.4–12.4)
POTASSIUM SERPL-SCNC: 4.1 MEQ/L (ref 3.5–5.2)
PROT SERPL-MCNC: 6.8 G/DL (ref 6.4–8.3)
PROTHROMBIN TIME: 13.5 SECONDS (ref 10–13.5)
RBC # BLD AUTO: 2.94 MILL/MM3 (ref 4.7–6.1)
SODIUM SERPL-SCNC: 136 MEQ/L (ref 135–145)
WBC # BLD AUTO: 5.9 THOU/MM3 (ref 4.8–10.8)

## 2025-08-06 PROCEDURE — 6370000000 HC RX 637 (ALT 250 FOR IP)

## 2025-08-06 PROCEDURE — 2700000000 HC OXYGEN THERAPY PER DAY

## 2025-08-06 PROCEDURE — 99239 HOSP IP/OBS DSCHRG MGMT >30: CPT | Performed by: NURSE PRACTITIONER

## 2025-08-06 PROCEDURE — 36415 COLL VENOUS BLD VENIPUNCTURE: CPT

## 2025-08-06 PROCEDURE — 85610 PROTHROMBIN TIME: CPT

## 2025-08-06 PROCEDURE — 94660 CPAP INITIATION&MGMT: CPT

## 2025-08-06 PROCEDURE — 6360000002 HC RX W HCPCS

## 2025-08-06 PROCEDURE — 94761 N-INVAS EAR/PLS OXIMETRY MLT: CPT

## 2025-08-06 PROCEDURE — 85730 THROMBOPLASTIN TIME PARTIAL: CPT

## 2025-08-06 PROCEDURE — 99232 SBSQ HOSP IP/OBS MODERATE 35: CPT | Performed by: INTERNAL MEDICINE

## 2025-08-06 PROCEDURE — 82948 REAGENT STRIP/BLOOD GLUCOSE: CPT

## 2025-08-06 PROCEDURE — 80053 COMPREHEN METABOLIC PANEL: CPT

## 2025-08-06 PROCEDURE — 90935 HEMODIALYSIS ONE EVALUATION: CPT

## 2025-08-06 PROCEDURE — 85025 COMPLETE CBC W/AUTO DIFF WBC: CPT

## 2025-08-06 PROCEDURE — 2500000003 HC RX 250 WO HCPCS

## 2025-08-06 RX ADMIN — SODIUM CHLORIDE, PRESERVATIVE FREE 10 ML: 5 INJECTION INTRAVENOUS at 09:13

## 2025-08-06 RX ADMIN — HEPARIN SODIUM 5000 UNITS: 5000 INJECTION INTRAVENOUS; SUBCUTANEOUS at 05:17

## 2025-08-06 RX ADMIN — LEVOTHYROXINE SODIUM 168.75 MCG: 20 INJECTION, SOLUTION INTRAVENOUS at 05:09

## 2025-08-06 RX ADMIN — PANTOPRAZOLE SODIUM 40 MG: 40 INJECTION, POWDER, LYOPHILIZED, FOR SOLUTION INTRAVENOUS at 09:12

## 2025-08-06 RX ADMIN — ACETAMINOPHEN 650 MG: 325 TABLET ORAL at 05:09

## 2025-08-06 ASSESSMENT — PAIN DESCRIPTION - DESCRIPTORS: DESCRIPTORS: ACHING

## 2025-08-06 ASSESSMENT — PAIN SCALES - GENERAL
PAINLEVEL_OUTOF10: 10
PAINLEVEL_OUTOF10: 10

## 2025-08-06 ASSESSMENT — PAIN DESCRIPTION - ORIENTATION: ORIENTATION: LOWER

## 2025-08-06 ASSESSMENT — PAIN DESCRIPTION - LOCATION: LOCATION: BACK

## 2025-08-06 ASSESSMENT — PAIN - FUNCTIONAL ASSESSMENT: PAIN_FUNCTIONAL_ASSESSMENT: ACTIVITIES ARE NOT PREVENTED

## 2025-08-10 DIAGNOSIS — G89.4 CHRONIC PAIN SYNDROME: ICD-10-CM

## 2025-08-10 DIAGNOSIS — I50.43 CHF (CONGESTIVE HEART FAILURE), NYHA CLASS III, ACUTE ON CHRONIC, COMBINED (HCC): ICD-10-CM

## 2025-08-10 DIAGNOSIS — G62.9 NEUROPATHY: ICD-10-CM

## 2025-08-11 ENCOUNTER — TELEPHONE (OUTPATIENT)
Dept: INTERNAL MEDICINE CLINIC | Age: 65
End: 2025-08-11

## 2025-08-13 ENCOUNTER — OFFICE VISIT (OUTPATIENT)
Dept: INTERNAL MEDICINE CLINIC | Age: 65
End: 2025-08-13

## 2025-08-13 VITALS
HEART RATE: 80 BPM | OXYGEN SATURATION: 96 % | TEMPERATURE: 98 F | DIASTOLIC BLOOD PRESSURE: 68 MMHG | SYSTOLIC BLOOD PRESSURE: 132 MMHG | BODY MASS INDEX: 41.19 KG/M2 | WEIGHT: 312.2 LBS | RESPIRATION RATE: 20 BRPM

## 2025-08-13 DIAGNOSIS — I50.43 CHF (CONGESTIVE HEART FAILURE), NYHA CLASS III, ACUTE ON CHRONIC, COMBINED (HCC): ICD-10-CM

## 2025-08-13 DIAGNOSIS — N18.4 CHRONIC KIDNEY DISEASE, STAGE 4 (SEVERE) (HCC): ICD-10-CM

## 2025-08-13 DIAGNOSIS — E08.42 DIABETES MELLITUS DUE TO UNDERLYING CONDITION WITH DIABETIC POLYNEUROPATHY, WITHOUT LONG-TERM CURRENT USE OF INSULIN (HCC): ICD-10-CM

## 2025-08-13 DIAGNOSIS — G25.2 ACTION TREMOR: ICD-10-CM

## 2025-08-13 DIAGNOSIS — M05.20 RHEUMATOID ARTERITIS (HCC): ICD-10-CM

## 2025-08-13 DIAGNOSIS — G25.81 RLS (RESTLESS LEGS SYNDROME): ICD-10-CM

## 2025-08-13 RX ORDER — PRIMIDONE 50 MG/1
TABLET ORAL
Qty: 270 TABLET | Refills: 3 | Status: SHIPPED | OUTPATIENT
Start: 2025-08-13

## 2025-08-13 RX ORDER — CARVEDILOL 25 MG/1
25 TABLET ORAL 2 TIMES DAILY
Qty: 180 TABLET | Refills: 3 | OUTPATIENT
Start: 2025-08-13

## 2025-08-13 RX ORDER — LIDOCAINE 4 G/G
2 PATCH TOPICAL DAILY
Qty: 60 PATCH | Refills: 3 | Status: SHIPPED | OUTPATIENT
Start: 2025-08-13

## 2025-08-13 RX ORDER — CARVEDILOL 25 MG/1
25 TABLET ORAL 2 TIMES DAILY
Qty: 180 TABLET | Refills: 3 | Status: SHIPPED | OUTPATIENT
Start: 2025-08-13

## 2025-08-13 RX ORDER — MONTELUKAST SODIUM 10 MG/1
10 TABLET ORAL NIGHTLY
Qty: 90 TABLET | Refills: 3 | Status: SHIPPED | OUTPATIENT
Start: 2025-08-13

## 2025-08-14 ENCOUNTER — HOSPITAL ENCOUNTER (OUTPATIENT)
Dept: PHYSICAL THERAPY | Age: 65
Setting detail: THERAPIES SERIES
End: 2025-08-14
Payer: MEDICARE

## 2025-08-14 ENCOUNTER — TELEPHONE (OUTPATIENT)
Dept: INTERNAL MEDICINE CLINIC | Age: 65
End: 2025-08-14

## 2025-08-14 RX ORDER — GABAPENTIN 300 MG/1
300 CAPSULE ORAL NIGHTLY
Qty: 90 CAPSULE | Refills: 1 | Status: SHIPPED | OUTPATIENT
Start: 2025-08-14 | End: 2026-02-10

## 2025-08-17 ENCOUNTER — PATIENT MESSAGE (OUTPATIENT)
Dept: PALLATIVE CARE | Age: 65
End: 2025-08-17

## 2025-08-17 DIAGNOSIS — G62.9 NEUROPATHY: ICD-10-CM

## 2025-08-17 DIAGNOSIS — M48.062 SPINAL STENOSIS OF LUMBAR REGION WITH NEUROGENIC CLAUDICATION: ICD-10-CM

## 2025-08-17 DIAGNOSIS — Z99.2 ESRD (END STAGE RENAL DISEASE) ON DIALYSIS (HCC): ICD-10-CM

## 2025-08-17 DIAGNOSIS — R45.89 ANXIETY ABOUT HEALTH: Primary | ICD-10-CM

## 2025-08-17 DIAGNOSIS — M17.0 PRIMARY OSTEOARTHRITIS OF BOTH KNEES: ICD-10-CM

## 2025-08-17 DIAGNOSIS — G89.4 CHRONIC PAIN SYNDROME: ICD-10-CM

## 2025-08-17 DIAGNOSIS — M25.561 CHRONIC PAIN OF BOTH KNEES: ICD-10-CM

## 2025-08-17 DIAGNOSIS — M47.816 SPONDYLOSIS OF LUMBAR REGION WITHOUT MYELOPATHY OR RADICULOPATHY: ICD-10-CM

## 2025-08-17 DIAGNOSIS — G89.29 CHRONIC PAIN OF BOTH KNEES: ICD-10-CM

## 2025-08-17 DIAGNOSIS — M47.816 LUMBAR FACET ARTHROPATHY: ICD-10-CM

## 2025-08-17 DIAGNOSIS — N18.6 ESRD (END STAGE RENAL DISEASE) ON DIALYSIS (HCC): ICD-10-CM

## 2025-08-17 DIAGNOSIS — M25.562 CHRONIC PAIN OF BOTH KNEES: ICD-10-CM

## 2025-08-18 RX ORDER — OXYCODONE AND ACETAMINOPHEN 10; 325 MG/1; MG/1
1 TABLET ORAL EVERY 4 HOURS PRN
Qty: 180 TABLET | Refills: 0 | Status: SHIPPED | OUTPATIENT
Start: 2025-08-18 | End: 2025-08-18 | Stop reason: ALTCHOICE

## 2025-08-18 RX ORDER — HYDROMORPHONE HYDROCHLORIDE 2 MG/1
2 TABLET ORAL EVERY 4 HOURS PRN
Qty: 42 TABLET | Refills: 0 | Status: SHIPPED | OUTPATIENT
Start: 2025-08-18 | End: 2025-08-22 | Stop reason: SDUPTHER

## 2025-08-18 RX ORDER — LORAZEPAM 0.5 MG/1
0.25 TABLET ORAL NIGHTLY PRN
Qty: 14 TABLET | Refills: 0 | Status: SHIPPED | OUTPATIENT
Start: 2025-08-18 | End: 2025-08-22 | Stop reason: DRUGHIGH

## 2025-08-22 ENCOUNTER — TELEPHONE (OUTPATIENT)
Dept: PALLATIVE CARE | Age: 65
End: 2025-08-22

## 2025-08-22 DIAGNOSIS — G89.4 CHRONIC PAIN SYNDROME: ICD-10-CM

## 2025-08-22 DIAGNOSIS — N18.6 ESRD (END STAGE RENAL DISEASE) ON DIALYSIS (HCC): ICD-10-CM

## 2025-08-22 DIAGNOSIS — M25.562 CHRONIC PAIN OF BOTH KNEES: ICD-10-CM

## 2025-08-22 DIAGNOSIS — M47.816 LUMBAR FACET ARTHROPATHY: ICD-10-CM

## 2025-08-22 DIAGNOSIS — G62.9 NEUROPATHY: ICD-10-CM

## 2025-08-22 DIAGNOSIS — M48.062 SPINAL STENOSIS OF LUMBAR REGION WITH NEUROGENIC CLAUDICATION: ICD-10-CM

## 2025-08-22 DIAGNOSIS — R45.89 ANXIETY ABOUT HEALTH: ICD-10-CM

## 2025-08-22 DIAGNOSIS — M17.0 PRIMARY OSTEOARTHRITIS OF BOTH KNEES: ICD-10-CM

## 2025-08-22 DIAGNOSIS — Z99.2 ESRD (END STAGE RENAL DISEASE) ON DIALYSIS (HCC): ICD-10-CM

## 2025-08-22 DIAGNOSIS — M25.561 CHRONIC PAIN OF BOTH KNEES: ICD-10-CM

## 2025-08-22 DIAGNOSIS — G89.29 CHRONIC PAIN OF BOTH KNEES: ICD-10-CM

## 2025-08-22 RX ORDER — HYDROMORPHONE HYDROCHLORIDE 2 MG/1
4 TABLET ORAL EVERY 4 HOURS PRN
Qty: 60 TABLET | Refills: 0 | Status: SHIPPED | OUTPATIENT
Start: 2025-08-22 | End: 2025-08-27

## 2025-08-22 RX ORDER — LORAZEPAM 0.5 MG/1
0.5 TABLET ORAL NIGHTLY PRN
Qty: 30 TABLET | Refills: 0 | Status: SHIPPED | OUTPATIENT
Start: 2025-08-22 | End: 2025-09-21

## 2025-08-22 RX ORDER — OXYCODONE AND ACETAMINOPHEN 10; 325 MG/1; MG/1
1 TABLET ORAL EVERY 4 HOURS PRN
Qty: 42 TABLET | Refills: 0 | Status: SHIPPED | OUTPATIENT
Start: 2025-08-22 | End: 2025-08-22 | Stop reason: ALTCHOICE

## 2025-08-28 ENCOUNTER — HOSPITAL ENCOUNTER (OUTPATIENT)
Dept: PHYSICAL THERAPY | Age: 65
Setting detail: THERAPIES SERIES
Discharge: HOME | End: 2025-08-28
Payer: MEDICARE

## 2025-08-28 PROCEDURE — 97110 THERAPEUTIC EXERCISES: CPT

## 2025-08-28 PROCEDURE — 97140 MANUAL THERAPY 1/> REGIONS: CPT

## 2025-08-28 PROCEDURE — 97530 THERAPEUTIC ACTIVITIES: CPT

## 2025-09-04 ENCOUNTER — TELEPHONE (OUTPATIENT)
Dept: UROLOGY | Age: 65
End: 2025-09-04

## 2025-09-04 PROBLEM — R79.89 ELEVATED TROPONIN: Status: RESOLVED | Noted: 2023-03-08 | Resolved: 2025-09-04

## (undated) DEVICE — SPONGE LAP W18XL18IN WHT COT 4 PLY FLD STRUNG RADPQ DISP ST

## (undated) DEVICE — BLADE ES ELASTOMERIC COAT INSUL DURABLE BEND UPTO 90DEG

## (undated) DEVICE — SYRINGE IRRIG 60ML SFT PLIABLE BLB EZ TO GRP 1 HND USE W/

## (undated) DEVICE — SOLUTION IV IRRIG POUR BRL 0.9% SODIUM CHL 2F7124

## (undated) DEVICE — APPLICATOR MEDICATED 26 CC SOLUTION HI LT ORNG CHLORAPREP

## (undated) DEVICE — Device

## (undated) DEVICE — GLOVE ORANGE PI 8   MSG9080

## (undated) DEVICE — PREP SOL PVP IODINE 4%  4 OZ/BTL

## (undated) DEVICE — 3M™ STERI-DRAPE™ INSTRUMENT POUCH 1018: Brand: STERI-DRAPE™

## (undated) DEVICE — GLOVE ORANGE PI 8 1/2   MSG9085

## (undated) DEVICE — CANNULA INJ L2.5IN BLNT TIP 3MM CLR BODY W/ 1 W VLV DLP

## (undated) DEVICE — GOWN,SIRUS,NONRNF,SETINSLV,XL,20/CS: Brand: MEDLINE

## (undated) DEVICE — ELECTRODE ELECSURG NDL 2.8 INX7.2 CM COAT INSUL EDGE

## (undated) DEVICE — PACK-MAJOR

## (undated) DEVICE — DRAPE,EXTREMITY,89X128,STERILE: Brand: MEDLINE

## (undated) DEVICE — BINDER ABD 4-PANEL 12INCH 63-74INCH L N ST

## (undated) DEVICE — CATHETER IV 18GA L1.16IN OD1.27-1.3462MM ID0.9398-1.016MM

## (undated) DEVICE — PENCIL SMK EVAC ALL IN 1 DSGN ENH VISIBILITY IMPROVED AIR

## (undated) DEVICE — RELOAD STPL L75MM OPN H3.8MM CLS 1.5MM WIRE DIA0.2MM REG

## (undated) DEVICE — SUTURE VCRL SZ 0 L18IN ABSRB VLT SUTUPAK PRECUT W/O NDL J106T

## (undated) DEVICE — SUTURE PROL SZ 6-0 L24IN NONABSORBABLE BLU L13MM C-1 3/8 8726H

## (undated) DEVICE — GAUZE,SPONGE,8"X4",12PLY,XRAY,STRL,LF: Brand: MEDLINE

## (undated) DEVICE — SPONGE GZ W4XL4IN COT 12 PLY TYP VII WVN C FLD DSGN

## (undated) DEVICE — COVER ARMBRD W13XL28.5IN IMPERV BLU FOR OP RM

## (undated) DEVICE — BLADE LARYNSCP SZ 4 ENH DIR INTUB GLIDESCOPE MCGRATH MAC

## (undated) DEVICE — SUTURE PROL SZ 3-0 L30IN NONABSORBABLE BLU L26MM CT-2 1/2 8422H

## (undated) DEVICE — SUTURE PERMAHAND SZ 3-0 L18IN NONABSORBABLE BLK SILK BRAID A184H

## (undated) DEVICE — PAD,ABDOMINAL,5"X9",ST,LF,25/BX: Brand: MEDLINE INDUSTRIES, INC.

## (undated) DEVICE — ADHESIVE SKIN CLSR 0.7ML TOP DERMBND ADV

## (undated) DEVICE — BASIC SINGLE BASIN BTC-LF: Brand: MEDLINE INDUSTRIES, INC.

## (undated) DEVICE — INTENDED FOR TISSUE SEPARATION, AND OTHER PROCEDURES THAT REQUIRE A SHARP SURGICAL BLADE TO PUNCTURE OR CUT.: Brand: BARD-PARKER ® CARBON RIB-BACK BLADES

## (undated) DEVICE — APPLIER LIG CLP M L11IN TI STR RNG HNDL FOR 20 CLP DISP

## (undated) DEVICE — 3M™ WARMING BLANKET, UPPER BODY, 10 PER CASE, 42268: Brand: BAIR HUGGER™

## (undated) DEVICE — MARKER,SKIN,WI/RULER AND LABELS: Brand: MEDLINE

## (undated) DEVICE — LOOP VES W13MM THK09MM MINI RED SIL FLD REPELLENT

## (undated) DEVICE — PACK PROCEDURE SURG SET UP SRMC

## (undated) DEVICE — SCANLAN® VASCU-STATT® II PLUS S-USE BULLDOG CLAMP W/FIRM PRESS GENTLE-JAW™- MAXI ANGLED 45° (ORANGE), CLAMPING PRESS 165-175 G (2/STERILE PKG): Brand: SCANLAN® VASCU-STATT® II PLUS S-USE BULLDOG CLAMP W/FIRM PRESS GENTLE-JAW™

## (undated) DEVICE — GLOVE SURG SZ 7.5 L11.73IN FNGR THK9.8MIL STRW LTX POLYMER

## (undated) DEVICE — 35 ML SYRINGE LUER-LOCK TIP: Brand: MONOJECT

## (undated) DEVICE — TOTAL TRAY, DB, 100% SILI FOLEY, 16FR 10: Brand: MEDLINE

## (undated) DEVICE — SUTURE CHROMIC GUT SZ 3-0 L27IN ABSRB BRN L19MM FS-2 3/8 636H

## (undated) DEVICE — SOLUTION IV IRRIG WATER 1000ML POUR BRL 2F7114

## (undated) DEVICE — SUTURE MCRYL + SZ 3-0 L27IN ABSRB UD PS1 L24MM 3/8 CIR REV MCP936H

## (undated) DEVICE — TOWEL,OR,DSP,ST,WHITE,DLX,XR,4/PK,20PK/C: Brand: MEDLINE

## (undated) DEVICE — SUTURE VCRL SZ 0 L27IN ABSRB UD L36MM CT-1 1/2 CIR J260H

## (undated) DEVICE — Z INACTIVE USE 2854097 SPONGE GZ W4XL4IN COT 12 PLY TYP VII WVN C FLD DSGN

## (undated) DEVICE — SPONGE DRN W4XL4IN RAYON/POLYESTER 6 PLY NONWOVEN PRECUT

## (undated) DEVICE — FOGARTY SPRING CLIPS 6MM: Brand: FOGARTY SOFTJAW

## (undated) DEVICE — SUTURE PERMAHAND SZ 3-0 L30IN NONABSORBABLE BLK SH L26MM K832H

## (undated) DEVICE — ELECTRODE PT RET AD L9FT HI MOIST COND ADH HYDRGEL CORDED

## (undated) DEVICE — TUBING, SUCTION, 1/4" X 20', STRAIGHT: Brand: MEDLINE INDUSTRIES, INC.

## (undated) DEVICE — BIOGUARD A/W CLEANING ADAPTER

## (undated) DEVICE — SUTURE VCRL + SZ 2-0 L27IN ABSRB WHT SH 1/2 CIR TAPERCUT VCP417H

## (undated) DEVICE — YANKAUER,POOLE TIP,STERILE,50/CS: Brand: MEDLINE

## (undated) DEVICE — 450 ML BOTTLE OF 0.05% CHLORHEXIDINE GLUCONATE IN 99.95% STERILE WATER FOR IRRIGATION, USP AND APPLICATOR.: Brand: IRRISEPT ANTIMICROBIAL WOUND LAVAGE

## (undated) DEVICE — STAPLER INT L75MM CUT LN L73MM STPL LN L77MM BLU B FRM 8

## (undated) DEVICE — SOLUTION IV 1000ML 0.9% SOD CHL PH 5 INJ USP VIAFLX PLAS

## (undated) DEVICE — ULTRACLEAN ACCESSORY ELECTRODE 6" (15.24 CM) COATED BLADE: Brand: ULTRACLEAN

## (undated) DEVICE — Z DISCONTINUED BY MEDLINE USE 2711682 TRAY SKIN PREP DRY W/ PREM GLV

## (undated) DEVICE — BLADE CLIPPER GEN PURP NS

## (undated) DEVICE — BANDAGE,GAUZE,4.5"X4.1YD,STERILE,LF: Brand: MEDLINE

## (undated) DEVICE — APPLIER CLP L L13IN TI MULT RNG HNDL 20 CLP STR LIGACLP

## (undated) DEVICE — BREAST HERNIA PACK: Brand: MEDLINE INDUSTRIES, INC.

## (undated) DEVICE — DRAPE GYN LAPSCP UROLOGY CLR DISP STRL OCVD CLRVIEWDRP

## (undated) DEVICE — CYSTO PACK: Brand: MEDLINE INDUSTRIES, INC.

## (undated) DEVICE — SUTURE PROL SZ 7-0 L24IN NONABSORBABLE BLU L9.3MM CC 3/8 8704H

## (undated) DEVICE — SOLUTION SURG PREP POV IOD 7.5% 4 OZ

## (undated) DEVICE — ROYAL SILK SURGICAL GOWN, XXL: Brand: CONVERTORS

## (undated) DEVICE — BOOT,SUTURE,STANDARD,YELLOW-IN-BLUE: Brand: MEDLINE

## (undated) DEVICE — SEALANT HEMSTAT 5ML HUM FIBRIN THROM 2 VI APPL DEV EVICEL

## (undated) DEVICE — Z DISCONTINUED NO SUB IDED CONNECTOR SURG HEMOSTATIC OPN FLD FOR BIOMATERIAL

## (undated) DEVICE — YANKAUER,BULB TIP,W/O VENT,RIGID,STERILE: Brand: MEDLINE

## (undated) DEVICE — STAPLER INT L60MM REG TISS BLU B FRM 8 FIRING 2 ROW AUTO

## (undated) DEVICE — GOWN,SIRUS,NON REINFRCD,LARGE,SET IN SL: Brand: MEDLINE

## (undated) DEVICE — ABDOMINAL BINDER: Brand: DEROYAL

## (undated) DEVICE — TRAY CATH 16FR F INCLUDE LUB DRNGE BG STATLOK STBL DEV

## (undated) DEVICE — GLOVE ORANGE PI 7   MSG9070

## (undated) DEVICE — GLOVE SURG SZ 65 THK91MIL LTX FREE SYN POLYISOPRENE

## (undated) DEVICE — SET IRRIG L94IN ID0.281IN W/ 4.5IN DST FLX CONN 2 LD ON OFF

## (undated) DEVICE — BREAST HERNIA: Brand: MEDLINE INDUSTRIES, INC.

## (undated) DEVICE — AGENT HEMOSTATIC SURGIFLOW MATRIX KIT W/THROMBIN

## (undated) DEVICE — GLOVE ORANGE PI 7 1/2   MSG9075